# Patient Record
Sex: MALE | Race: WHITE | NOT HISPANIC OR LATINO | Employment: UNEMPLOYED
[De-identification: names, ages, dates, MRNs, and addresses within clinical notes are randomized per-mention and may not be internally consistent; named-entity substitution may affect disease eponyms.]

---
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
## 2018-02-13 ENCOUNTER — TRANSFERRED RECORDS (OUTPATIENT)
Dept: HEALTH INFORMATION MANAGEMENT | Facility: CLINIC | Age: 18
End: 2018-02-13

## 2018-02-15 ENCOUNTER — TRANSFERRED RECORDS (OUTPATIENT)
Dept: HEALTH INFORMATION MANAGEMENT | Facility: CLINIC | Age: 18
End: 2018-02-15

## 2018-02-15 LAB — EJECTION FRACTION: 58

## 2018-02-21 LAB — EJECTION FRACTION: 73

## 2018-03-20 ENCOUNTER — TRANSFERRED RECORDS (OUTPATIENT)
Dept: HEALTH INFORMATION MANAGEMENT | Facility: CLINIC | Age: 18
End: 2018-03-20

## 2018-03-22 ENCOUNTER — RESULTS ONLY (OUTPATIENT)
Dept: OTHER | Facility: CLINIC | Age: 18
End: 2018-03-22

## 2018-03-22 ENCOUNTER — TRANSFERRED RECORDS (OUTPATIENT)
Dept: HEALTH INFORMATION MANAGEMENT | Facility: CLINIC | Age: 18
End: 2018-03-22

## 2018-03-22 ENCOUNTER — APPOINTMENT (OUTPATIENT)
Dept: LAB | Facility: CLINIC | Age: 18
End: 2018-03-22
Attending: PEDIATRICS
Payer: COMMERCIAL

## 2018-03-22 PROCEDURE — 81370 HLA I & II TYPING LR: CPT | Performed by: PEDIATRICS

## 2018-03-22 PROCEDURE — 81382 HLA II TYPING 1 LOC HR: CPT | Performed by: PEDIATRICS

## 2018-03-22 PROCEDURE — 81376 HLA II TYPING 1 LOCUS LR: CPT | Mod: 91 | Performed by: PEDIATRICS

## 2018-03-22 PROCEDURE — 86828 HLA CLASS I&II ANTIBODY QUAL: CPT | Performed by: PEDIATRICS

## 2018-03-22 PROCEDURE — 81378 HLA I & II TYPING HR: CPT | Performed by: PEDIATRICS

## 2018-03-24 ENCOUNTER — APPOINTMENT (OUTPATIENT)
Dept: LAB | Facility: CLINIC | Age: 18
End: 2018-03-24
Attending: PEDIATRICS
Payer: COMMERCIAL

## 2018-03-24 PROCEDURE — 81370 HLA I & II TYPING LR: CPT | Performed by: PEDIATRICS

## 2018-03-24 PROCEDURE — 81376 HLA II TYPING 1 LOCUS LR: CPT | Mod: 59,91 | Performed by: PEDIATRICS

## 2018-03-27 LAB
A* LOCUS NMDP: NORMAL
A* LOCUS: NORMAL
A* NMDP: NORMAL
A*: NORMAL
ABTEST METHOD: NORMAL
B* LOCUS NMDP: NORMAL
B* LOCUS: NORMAL
B* NMDP: NORMAL
B*: NORMAL
BW-1: NORMAL
BW-2: NORMAL
C* LOCUS NMDP: NORMAL
C* LOCUS: NORMAL
C* NMDP: NORMAL
C*: NORMAL
DPA1* NMDP: NORMAL
DPA1*: NORMAL
DPB1* NMDP: NORMAL
DPB1*: NORMAL
DQA1*: NORMAL
DQA1*LOCUS NMDP: NORMAL
DQA1*LOCUS: NORMAL
DQB1* LOCUS NMDP: NORMAL
DQB1* LOCUS: NORMAL
DQB1* NMDP: NORMAL
DQB1*: NORMAL
DRB1* LOCUS NMDP: NORMAL
DRB1* LOCUS: NORMAL
DRB1* NMDP: NORMAL
DRB1*: NORMAL
DRB3* LOCUS NMDP: NORMAL
DRB3* LOCUS: NORMAL
DRB4*: NORMAL
DRSSO TEST METHOD: NORMAL
SCR 1 TEST METHOD: NORMAL
SCR1 CELL: NORMAL
SCR1 COMMENTS: NORMAL
SCR1 RESULT: NORMAL
SCR2 CELL: NORMAL
SCR2 COMMENTS: NORMAL
SCR2 RESULT: NORMAL
SCR2 TEST METHOD: NORMAL

## 2018-03-28 NOTE — PROGRESS NOTES
Pediatric Hematology/Oncology Clinic Note    I was requested by Dr. Manjinder Paniagua to see Artemio Nino for consultation on management of induction failure acute lymphoblastic leukemia.     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 Artemio was diagnosed on 2/5/18 during work-up for nonexertional syncope. WBC on diagnosis was 8,000. He is CNS status 1. Cytogenetics showed loss of IKZF1, LDA pos for Ph-like ALL, CRLF2 low, Fusion [id number] - activating JAK2. He did not have steroid pretreatment. He did not have testicular involvement. He underwent fertility preservation with sperm collection and then began induction chemotherapy per [id number] on 2/15/18. Day 8 LP was negative, Day 8 MRD 5.2%. 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 His end of induction marrow on 3/15/18 showed 31% blasts per [place] lab, 8% by morphology and was determined have shown induction failure. He was subsequently removed from study. He met with his primary oncologist Dr. Paniagua to discuss plans moving forward and he was subsequently referred for BMT to see Dr. Zavala on 4/6/18. He started Inotuzumab and has already received one dose. He is here today for a second opinion.    Complications during induction include concern for possible bactrim allergy as Artemio demonstrated a rash on his body that is itchy, diffuse, and non-petechial, steroid induced hyperglycemia (did not require insulin), and Palomino-Parkinson-White.     INTERVAL HISTORY  Artemio has been doing well since being discharged from the hospital. He continues with some intermittent nausea but has some available PRNs that he uses which seem to help. His energy and appetite are slowly improving. He states that he took his bactrim this week on Monday and Tuesday and has not noticed a rash so he is not sure that  his prior rash was related to the bactrim. He has no questions today and allows his Grandmother to ask many questions.     REVIEW OF SYSTEMS  General: fatigue, thin, pale  Skin: no rash  Eyes: negative  Ears/Nose/Throat: negative  Respiratory: No shortness of breath, dyspnea on exertion, cough, or hemoptysis  Cardiovascular: +WPW, no palpitations   Gastrointestinal: negative  Genitourinary: negative  Musculoskeletal: negative  Neurologic: negative  Psychiatric: negative  Hematologic/Lymphatic: +leukemia  Allergies/Immunologic: negative:  No known drug allergies   Endocrine: negative    PAST MEDICAL HISTORY  No past medical history on file.   L comminuted and shorted mid-clavicular fracture at age 14  L fibular closed stress fracture at age 16    PAST SURGICAL HISTORY  No past surgical history on file.   L clavicle ORIF with Lila locking plate and 7-hole with 6 screws and 1 lag screw - plate remains  Placement of venous port (large hub, 6.6 Turkish), left subclavian vein - 2/15/18    FAMILY HISTORY  No family history on file.   Maternal grandfather: thrombocytopenia  Paternal grandfather: Prostate Cancer, type 2 diabetes.    SOCIAL HISTORY  Social History     Social History Narrative     No narrative on file   Lives with grandmother and grandfather. Mother is involved but lives far away. Father lives in the area. Artemio finished high school and some college. He worked as a  with his father occasionally.    MEDICATIONS    Current Outpatient Prescriptions on File Prior to Visit:  TYLENOL OR None Entered     No current facility-administered medications on file prior to visit.     Physical Exam:   There were no vitals taken for this visit.,   General: Thin, pale adolescent, interactive  HEENT: NC/AT - alopecia. Eyes PERRL, EOMI, anicteric and non-injected. Nares clear. TMs pearly gray bilaterally. OP moist/pink without lesions, erythema or exudate.   Neck: Supple, no thyromegaly. Full ROM.  Lymph: No cervical,  "supraclavicular, axillary or inguinal adenopathy  Resp: Good air entry. Normal WOB. CTAB.  Cardiac: RRR. No murmur. Peripheral pulses intact. Cap refill < 2 sec. Port in place, c/d/i - not accessed.  Abdomen: BS+. Soft, NT, ND. No hepatosplenomegaly.  Neuro: Alert and oriented. CN 2-12 intact. Normal tone. Normal sensation. DTRs 2+ bilaterally. Normal gait.  Ext: WWP. MAEE. Symmetric. No edema.  Skin: No rashes, echymoses or other lesions.    LABS  CBCd on diagnosis 2/15/18;  WBC 6.2, Hgb 10.7, Plts 82, ANC 1.178    CBCd on 2/17/18 with 5% blasts    CSF 2/15/18 (On diagnosis):  Clear, 0 WBC, 20 RBC (no malignant cells)    CSF 2/22/18 (day 8 LP):  [place], 3 WBC, 200 RBC    Bone Marrow 2/15/18 (On diagnosis):    Bone Marrow Chromosome Analysis: Four of the 20 metaphase cells analyzed comprised a clone characterized by a translocation between the long arm of a chromosome 4 and the shore arm of a chromosome 12, and by a rearrangement of the short arm of chromosome 9 that could not be definitively characterized. In addition to the G-band analysis, FISH, and micro-array analyses were preformed which revealed losses of IKAROS and ETV6. No gain or loss of material relative to the abnormal chromosome 9 was detected, suggesting that the abnormality seen by G-banding may be a balanced rearrangement. These findings are consistent with but not specific to the diagnosis of acute lymphoblastic leukemia. The t(4;12) is not a known recurring abnormality and likely represents a secondary anomaly. Losses for IKZF1 and ETV6 are common in B-ALL and have been seen in the context of \"[place] chromosome like\" leukemia, although not exclusive to that subtype.     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 Reviewed outside records and reviewed diagnosis and Artemio's unfortunate induction failure. Discussed role of Inotuzumab and plan for referral to bone marrow transplant. Family already has an appointment with Dr. Zavala for 4/6/18. Artemio has 2 older sibs and they have already undergone HLA typing with reportedly his brother is a match.     PLAN  1. Continue Inotuzumab per primary team  2. RTC for BMT initial consult 4/6/18    Patient was seen and discussed with Pediatric Hematology/Oncology Dr. Reveles.   Edd Jones MD  Pediatric Hematology/Oncology/BMT Fellow    I saw and evaluated the patient with the fellow. I discussed the patient with the fellow and agree with the findings and plan as documented in the note. I personally spent a total of 45 minutes in the clinic in direct care of this patient. Total time included discussion with patient/family, physical examination, reviewing data such as laboratory and radiographic studies, and discussion with the fellow. Greater than 50% of the total time was spent counseling and/or coordinating the care of the patient. Details can be found in the fellow note.    Attila Reveles M.D./Ph.D  Pediatric Hematology/Oncology

## 2018-03-29 ENCOUNTER — MEDICAL CORRESPONDENCE (OUTPATIENT)
Dept: TRANSPLANT | Facility: CLINIC | Age: 18
End: 2018-03-29

## 2018-03-29 ENCOUNTER — OFFICE VISIT (OUTPATIENT)
Dept: PEDIATRIC HEMATOLOGY/ONCOLOGY | Facility: CLINIC | Age: 18
End: 2018-03-29
Attending: PEDIATRICS
Payer: COMMERCIAL

## 2018-03-29 DIAGNOSIS — C91.00 ACUTE LYMPHOBLASTIC LEUKEMIA (ALL) NOT HAVING ACHIEVED REMISSION (H): Primary | ICD-10-CM

## 2018-03-29 LAB
ASBTTEST METHOD: NORMAL
DRSBTTEST METHOD: NORMAL
SBTA* LOCUS: NORMAL
SBTA*: NORMAL
SBTB* LOCUS: NORMAL
SBTB*: NORMAL
SBTC* LOCUS: NORMAL
SBTC*: NORMAL
SBTDQB1*: NORMAL
SBTDQB1*LOCUS: NORMAL
SBTDRB1* LOCUS - FCIS: NORMAL
SBTDRB1* LOCUS NMDP: NORMAL
SBTDRB1*: NORMAL
SBTDRB3*LOCUS NMDP: NORMAL
SBTDRB3*LOCUS: NORMAL
SBTDRB4*LOCUS: NORMAL

## 2018-03-29 NOTE — LETTER
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 Artemio was diagnosed on 2/5/18 during work-up for nonexertional syncope. WBC on diagnosis was 8,000. He is CNS status 1. Cytogenetics showed loss of IKZF1, LDA pos for Ph-like ALL, CRLF2 low, Fusion [id number] - activating JAK2. He did not have steroid pretreatment. He did not have testicular involvement. He underwent fertility preservation with sperm collection and then began induction chemotherapy per [id number] on 2/15/18. Day 8 LP was negative, Day 8 MRD 5.2%. 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 His end of induction marrow on 3/15/18 showed 31% blasts per [place] lab, 8% by morphology and was determined have shown induction failure. He was subsequently removed from study. He met with his primary oncologist Dr. Paniagua to discuss plans moving forward and he was subsequently referred for BMT to see Dr. Zavala on 4/6/18. He started Inotuzumab and has already received one dose. He is here today for a second opinion.    Complications during induction include concern for possible bactrim allergy as Artemio demonstrated a rash on his body that is itchy, diffuse, and non-petechial, steroid induced hyperglycemia (did not require insulin), and Palomino-Parkinson-White.     INTERVAL HISTORY  Artemio has been doing well since being discharged from the hospital. He continues with some intermittent nausea but has some available PRNs that he uses which seem to help. His energy and appetite are slowly improving. He states that he took his bactrim this week on Monday and Tuesday and has not noticed a rash so he is not sure that his prior rash was related to the bactrim. He has no questions  today and allows his Grandmother to ask many questions.     REVIEW OF SYSTEMS  General: fatigue, thin, pale  Skin: no rash  Eyes: negative  Ears/Nose/Throat: negative  Respiratory: No shortness of breath, dyspnea on exertion, cough, or hemoptysis  Cardiovascular: +WPW, no palpitations   Gastrointestinal: negative  Genitourinary: negative  Musculoskeletal: negative  Neurologic: negative  Psychiatric: negative  Hematologic/Lymphatic: +leukemia  Allergies/Immunologic: negative:  No known drug allergies   Endocrine: negative    PAST MEDICAL HISTORY  No past medical history on file.   L comminuted and shorted mid-clavicular fracture at age 14  L fibular closed stress fracture at age 16    PAST SURGICAL HISTORY  No past surgical history on file.   L clavicle ORIF with Lila locking plate and 7-hole with 6 screws and 1 lag screw - plate remains  Placement of venous port (large hub, 6.6 Portuguese), left subclavian vein - 2/15/18    FAMILY HISTORY  No family history on file.   Maternal grandfather: thrombocytopenia  Paternal grandfather: Prostate Cancer, type 2 diabetes.    SOCIAL HISTORY  Social History     Social History Narrative     No narrative on file   Lives with grandmother and grandfather. Mother is involved but lives far away. Father lives in the area. Artemio finished high school and some college. He worked as a  with his father occasionally.    MEDICATIONS    Current Outpatient Prescriptions on File Prior to Visit:  TYLENOL OR None Entered     No current facility-administered medications on file prior to visit.     Physical Exam:   There were no vitals taken for this visit.,   General: Thin, pale adolescent, interactive  HEENT: NC/AT - alopecia. Eyes PERRL, EOMI, anicteric and non-injected. Nares clear. TMs pearly gray bilaterally. OP moist/pink without lesions, erythema or exudate.   Neck: Supple, no thyromegaly. Full ROM.  Lymph: No cervical, supraclavicular, axillary or inguinal adenopathy  Resp: Good  "air entry. Normal WOB. CTAB.  Cardiac: RRR. No murmur. Peripheral pulses intact. Cap refill < 2 sec. Port in place, c/d/i - not accessed.  Abdomen: BS+. Soft, NT, ND. No hepatosplenomegaly.  Neuro: Alert and oriented. CN 2-12 intact. Normal tone. Normal sensation. DTRs 2+ bilaterally. Normal gait.  Ext: WWP. MAEE. Symmetric. No edema.  Skin: No rashes, echymoses or other lesions.    LABS  CBCd on diagnosis 2/15/18;  WBC 6.2, Hgb 10.7, Plts 82, ANC 1.178    CBCd on 2/17/18 with 5% blasts    CSF 2/15/18 (On diagnosis):  Clear, 0 WBC, 20 RBC (no malignant cells)    CSF 2/22/18 (day 8 LP):  [place], 3 WBC, 200 RBC    Bone Marrow 2/15/18 (On diagnosis):    Bone Marrow Chromosome Analysis: Four of the 20 metaphase cells analyzed comprised a clone characterized by a translocation between the long arm of a chromosome 4 and the shore arm of a chromosome 12, and by a rearrangement of the short arm of chromosome 9 that could not be definitively characterized. In addition to the G-band analysis, FISH, and micro-array analyses were preformed which revealed losses of IKAROS and ETV6. No gain or loss of material relative to the abnormal chromosome 9 was detected, suggesting that the abnormality seen by G-banding may be a balanced rearrangement. These findings are consistent with but not specific to the diagnosis of acute lymphoblastic leukemia. The t(4;12) is not a known recurring abnormality and likely represents a secondary anomaly. Losses for IKZF1 and ETV6 are common in B-ALL and have been seen in the context of \"[place] chromosome like\" leukemia, although not exclusive to that subtype.     Flow: CD9, CD10, CD20, CD22, CD33, CD34, CD38, CD45, CD52, CD58, CD79a, HLA-DR, TdT, dual expression CD10/CD19 & TdT/CD19    Cytogenetics: 46, XY, t(4:12)(q22.1;p13.1),?inv(9)(p21p23)[4]/46,XY[16]    Fish: 74.5% loss of IKZF1, 80% loss of ETV6 & JAK2 activation    IMAGING  None    ASSESSMENT  Artemio is a 17 year old male with VHR B " lineage acute lymphoblastic leukemia Ph-like with JAK2 activation currently receiving care at [facility] here in [facility] clinic with his Grandmother for second opinion. Reviewed outside records and reviewed diagnosis and Artemio's unfortunate induction failure. Discussed role of Inotuzumab and plan for referral to bone marrow transplant. Family already has an appointment with Dr. Zavala for 4/6/18. Artemio has 2 older sibs and they have already undergone HLA typing with reportedly his brother is a match.     PLAN  1. Continue Inotuzumab per primary team  2. RTC for BMT initial consult 4/6/18    Patient was seen and discussed with Pediatric Hematology/Oncology Dr. Reveles.   Edd Jones MD  Pediatric Hematology/Oncology/BMT Fellow    I saw and evaluated the patient with the fellow. I discussed the patient with the fellow and agree with the findings and plan as documented in the note. I personally spent a total of 45 minutes in the clinic in direct care of this patient. Total time included discussion with patient/family, physical examination, reviewing data such as laboratory and radiographic studies, and discussion with the fellow. Greater than 50% of the total time was spent counseling and/or coordinating the care of the patient. Details can be found in the fellow note.    Attila Reveles M.D./Ph.D  Pediatric Hematology/Oncology      Attila Reveles MD

## 2018-03-29 NOTE — MR AVS SNAPSHOT
After Visit Summary   3/29/2018    Artemio Nino    MRN: [id number]           Patient Information     Date Of Birth          2000        Visit Information        Provider Department      3/29/2018 11:00 AM Attila Reveles MD Peds Hematology Oncology        Today's Diagnoses     Acute lymphoblastic leukemia (ALL) not having achieved remission (H)    -  1          [facility], 9th floor  [address] [place] [id number]  Phone: [phone]  Clinic Hours:   Monday-Friday:   7 am to 5:00 pm   closed weekends and major  holidays     If your fever is 100.5  or greater,   call the clinic during business hours.   After hours call [phone] and ask for the pediatric hematology / oncology physician to be paged for you.               Follow-ups after your visit        Your next 10 appointments already scheduled     Apr 06, 2018 11:00 AM CDT   [facility] Bmt Nurse Coord with [facility] PEDS BMT NURSE COORDINATOR   Peds Blood and Marrow Transplant ([facility])    [address] [id number] [phone]            Apr 06, 2018 11:00 AM CDT   [facility] Bmt Peds New with Viky Zavala MD   Peds Blood and Marrow Transplant ([facility])    [address] [id number] [phone]            Apr 06, 2018 12:30 PM CDT   SOCIAL WORK with [facility] PEDS BMT    Peds Blood and Marrow Transplant ([facility])    [address] [id number] [phone]              Who to contact     Please call your clinic at [phone] to:    Ask questions about your health    Make or cancel appointments    Discuss your medicines    Learn about your test results    Speak to your doctor            Additional Information About Your Visit        MyChart Information     MyChart  is an electronic gateway that provides easy, online access to your medical records. With [employer], you can request a clinic appointment, read your test results, renew a prescription or communicate with your care team.     To sign up for [employer] visit the website at [email or web address]   You will be asked to enter the access code listed below, as well as some personal information. Please follow the directions to create your username and password.     Your access code is: [email or web address]  Expires: 2018 10:16 AM     Your access code will  in 90 days. If you need help or a new code, please contact your [facility] or call [phone] for assistance.        Care EveryWhere ID     This is your Care EveryWhere ID. This could be used by other organizations to access your [facility] medical records  [id number]         Blood Pressure from Last 3 Encounters:   04/10/06 98/54   04 98/60    Weight from Last 3 Encounters:   06 27 kg (59 lb 8 oz) (57 %)*   04/10/06 26.2 kg (57 lb 12 oz) (60 %)*   04 20.8 kg (45 lb 12 oz) (47 %)*     * Growth percentiles are based on CDC 2-20 Years data.              Today, you had the following     No orders found for display       Primary Care Provider    None Specified       No primary provider on file.        Equal Access to Services     CHARY MEJIAS : Hadii errol palacios hadstephanie Soshady, waaxda denisa, qaybta kaaljessy garcía . So [facility] [phone].    ATENCIÓN: Si habla español, tiene a london disposición servicios gratuitos de asistencia lingüística. Rita al [phone].    We comply with applicable federal civil rights laws and [place] laws. We do not discriminate on the basis of race, color, national origin, age, disability, sex, sexual orientation, or gender identity.            Thank you!     Thank you for choosing PEDS HEMATOLOGY ONCOLOGY  for your care. Our goal is always to provide  you with excellent care. Hearing back from our patients is one way we can continue to improve our services. Please take a few minutes to complete the written survey that you may receive in the mail after your visit with us. Thank you!             Your Updated Medication List - Protect others around you: Learn how to safely use, store and throw away your medicines at www.disposemymeds.org.          This list is accurate as of 3/29/18 11:59 PM.  Always use your most recent med list.                   Brand Name Dispense Instructions for use Diagnosis    TYLENOL PO      None Entered    Acute pharyngitis

## 2018-04-02 ENCOUNTER — APPOINTMENT (OUTPATIENT)
Dept: LAB | Facility: CLINIC | Age: 18
End: 2018-04-02
Attending: PEDIATRICS
Payer: COMMERCIAL

## 2018-04-02 PROCEDURE — 81370 HLA I & II TYPING LR: CPT | Performed by: PEDIATRICS

## 2018-04-02 PROCEDURE — 81376 HLA II TYPING 1 LOCUS LR: CPT | Mod: 91 | Performed by: PEDIATRICS

## 2018-04-06 ENCOUNTER — ALLIED HEALTH/NURSE VISIT (OUTPATIENT)
Dept: TRANSPLANT | Facility: CLINIC | Age: 18
End: 2018-04-06
Attending: PEDIATRICS
Payer: COMMERCIAL

## 2018-04-06 DIAGNOSIS — C91.00 ALL (ACUTE LYMPHOCYTIC LEUKEMIA) (H): Primary | ICD-10-CM

## 2018-04-06 DIAGNOSIS — C91.00 ACUTE LYMPHOBLASTIC LEUKEMIA (ALL) NOT HAVING ACHIEVED REMISSION (H): Primary | ICD-10-CM

## 2018-04-06 DIAGNOSIS — Z71.9 ENCOUNTER FOR COUNSELING: Primary | ICD-10-CM

## 2018-04-06 DIAGNOSIS — Z76.82 BONE MARROW TRANSPLANT CANDIDATE: ICD-10-CM

## 2018-04-06 PROCEDURE — 40000268 ZZH STATISTIC NO CHARGES: Mod: ZF

## 2018-04-06 PROCEDURE — G0463 HOSPITAL OUTPT CLINIC VISIT: HCPCS | Mod: ZF

## 2018-04-06 PROCEDURE — 40000809 ZZH STATISTIC NO DOCUMENTATION TO SUPPORT CHARGE

## 2018-04-06 ASSESSMENT — PAIN SCALES - GENERAL: PAINLEVEL: NO PAIN (0)

## 2018-04-06 NOTE — LETTER
4/6/2018      RE: Artemio Nino  [address] [address]       April 6, 2018    Dear Dr. Paniagua,    I had the pleasure of seeing Artemio in our Pediatric Blood and Marrow Transplant Clinic on April 6, 2018.  He was accompanied by his mother, father and grandmother.  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 The purpose of today's visit was to discuss the rationale behind stem cell transplantation for his cure. In this note I will summarize his PMH along with our discussion.     Artemio was diagnosed on 2/5/18 during work-up for nonexertional syncope. WBC on diagnosis was 8,000. He is CNS status 1. Cytogenetics showed loss of IKZF1, LDA pos for Ph-like ALL, CRLF2 low, Fusion JAK2-SEW400 - activating JAK2. He did not have steroid pretreatment. He did not have testicular involvement. He underwent fertility preservation with sperm collection and then began induction chemotherapy per [id number] on 2/15/18. Day 8 LP was negative, Day 8 MRD 5.2%. 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 His end of induction marrow on 3/15/18 showed 31% blasts per [facility] lab, 8% by morphology and was determined have shown induction failure. He was subsequently removed from study. He met with his primary oncologist Dr. Paniagua to discuss plans moving forward and also had a second opinion from Dr. Attila Reveles. He started Inotuzumab and has already received two doses.    Pertinent labs for ALL    CBCd on diagnosis 2/15/18;  WBC 6.2, Hgb 10.7, Plts 82, ANC 1.178     CBCd on 2/17/18 with 5% blasts     CSF 2/15/18 (On diagnosis):  Clear, 0 WBC, 20 RBC (no malignant cells)     CSF 2/22/18 (day 8 LP):  Clarence, 3 WBC, 200 RBC     Bone Marrow 2/15/18 (On diagnosis):     Bone Marrow Chromosome Analysis: Four of the 20 metaphase cells analyzed comprised a clone characterized by a  "translocation between the long arm of a chromosome 4 and the shore arm of a chromosome 12, and by a rearrangement of the short arm of chromosome 9 that could not be definitively characterized. In addition to the G-band analysis, FISH, and micro-array analyses were preformed which revealed losses of IKAROS and ETV6. No gain or loss of material relative to the abnormal chromosome 9 was detected, suggesting that the abnormality seen by G-banding may be a balanced rearrangement. These findings are consistent with but not specific to the diagnosis of acute lymphoblastic leukemia. The t(4;12) is not a known recurring abnormality and likely represents a secondary anomaly. Losses for IKZF1 and ETV6 are common in B-ALL and have been seen in the context of \"Delta chromosome like\" leukemia, although not exclusive to that subtype.      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        Artemio has been doing well since being discharged from the hospital. He has no complaints.  He has no fever, cough or runny nose.  He will get an additional dose of inotuzumab on Monday.         PAST MEDICAL HISTORY  L comminuted and shorted mid-clavicular fracture at age 14  L fibular closed stress fracture at age 16     PAST SURGICAL HISTORY  L clavicle ORIF with Lila locking plate and 7-hole with 6 screws and 1 lag screw - plate remains  Placement of venous port (large hub, 6.6 Azeri), left subclavian vein - 2/15/18     FAMILY HISTORY    Maternal grandfather: " thrombocytopenia  Paternal grandfather: Prostate Cancer, type 2 diabetes.     SOCIAL HISTORY  Lives with grandmother and grandfather. Mother is involved and has moved back. Father lives in the area. Artemio finished high school and some college. He worked as a  with his father occasionally.       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  Of note both of his sibs are a match.       The next part of our discussion was around the utilization of stem cell transplant in curing relapsed leukemia.  We reviewed Artemio Nino treatment course so far and results from the latest bone marrow examination highlighting current disease status. For patients with ALL, studies have shown superior outcomes with disease in deep remission (MRD negative). We agree with further chemotherapy with inotuzumab, which will help eliminate leukemic cells further. Once in deep remission, stem cell transplant from the matched sibling will be the most preferred option.     We discussed overall transplant procedure and details about conditioning regimen, possible adverse effects as well as transplant related complications including but not limited to mucositis, fevers, nausea, vomiting, hypertension, transfusion requirements, TPN and organ toxicity. With matched sibling transplants transplant related mortality is less than 5% and the risk of hzcaj-yfuisr-iycw disease is about 20% as well. We also discussed that with type of conditioning that Artemio  will receive prior to transplant will involve total body radiation. As Artemio  will be most likely receiving matched sibling bone marrow stem cells form a fully HLA-matched donor with a myeloablative conditioning regimen, risk of graft failure is very low. We did inform him that even with successful matched  sibling BMT, there is a 25-30% of disease relapse.  I also discussed the harvest procedure with Artemio.  Both siblings are a match.  I will discuss with the group which would be better.  There is a concern that the sister may have some psychosocial issues that could preclude her.      Please keep us updated on his progress.  Once MRD negative we will plan on doing the workup week and having him come to transplant.     I will also plan on defibrotide prophylaxis due to inotuzumab.    Thank you for this referral.      Sincerely,    Viky Zavala MD, PhD    Pediatric Blood and Marrow Transplant  [facility]    I spent 50 minutes of face to face time with the family, all of which was counseling.  I spent an additional 30 minutes reviewing medical records and coordinating care.              Viky Zavala MD

## 2018-04-06 NOTE — MR AVS SNAPSHOT
After Visit Summary   2018    Artemio Nino    MRN: [id number]           Patient Information     Date Of Birth          2000        Visit Information        Provider Department      2018 12:30 PM P PEDS BMT  Peds Blood and Marrow Transplant        Today's Diagnoses     Encounter for counseling    -  1          [facility], 9th floor  [address] [place] [address]  Phone: [phone]  Clinic Hours:   Monday-Friday:   7 am to 5:00 pm   closed weekends and major  holidays     If your fever is 100.5  or greater,   call the clinic during business hours.   After hours call [phone] and ask for the pediatric BMT physician to be paged for you.              Care Instructions    No follow up instructions as of 2018 at 1:35pm SLL          Follow-ups after your visit        Who to contact     Please call your clinic at [phone] to:    Ask questions about your health    Make or cancel appointments    Discuss your medicines    Learn about your test results    Speak to your doctor            Additional Information About Your Visit        JorotoharDocument Agility Information     Bag Borrow or Steal is an electronic gateway that provides easy, online access to your medical records. With Bag Borrow or Steal, you can request a clinic appointment, read your test results, renew a prescription or communicate with your care team.     To sign up for Bag Borrow or Steal visit the website at [email or web address]   You will be asked to enter the access code listed below, as well as some personal information. Please follow the directions to create your username and password.     Your access code is: [email or web address]  Expires: 2018 10:16 AM     Your access code will  in 90 days. If you need help or a new code, please contact your [facility] or call [phone] for assistance.        Care EveryWhere ID     This is your Care EveryWhere ID.  This could be used by other organizations to access your [facility] medical records  [id number]         Blood Pressure from Last 3 Encounters:   04/10/06 98/54   07/29/04 98/60    Weight from Last 3 Encounters:   09/07/06 27 kg (59 lb 8 oz) (57 %)*   04/10/06 26.2 kg (57 lb 12 oz) (60 %)*   07/29/04 20.8 kg (45 lb 12 oz) (47 %)*     * Growth percentiles are based on [facility] 2-20 Years data.              Today, you had the following     No orders found for display       Primary Care Provider    None Specified       No primary provider on file.        Equal Access to Services     [facility]: Hadii errol Carlos, scott crawley, mega ehnryalmasebastián gomez, jessy palm . So [facility] [phone].    ATENCIÓN: Si habla español, tiene a london disposición servicios gratuitos de asistencia lingüística. Llame al [phone].    We comply with applicable federal civil rights laws and [place] laws. We do not discriminate on the basis of race, color, national origin, age, disability, sex, sexual orientation, or gender identity.            Thank you!     Thank you for choosing [facility] BLOOD AND MARROW TRANSPLANT  for your care. Our goal is always to provide you with excellent care. Hearing back from our patients is one way we can continue to improve our services. Please take a few minutes to complete the written survey that you may receive in the mail after your visit with us. Thank you!             Your Updated Medication List - Protect others around you: Learn how to safely use, store and throw away your medicines at www.disposemymeds.org.          This list is accurate as of 4/6/18 11:59 PM.  Always use your most recent med list.                   Brand Name Dispense Instructions for use Diagnosis    TYLENOL PO      None Entered    Acute pharyngitis

## 2018-04-06 NOTE — PROGRESS NOTES
"BMT Social Work New Transplant Evaluation    Present:  This  met with Artemio, his mother, his father, and his paternal grandmother on April 6, 2018 in the [facility].     Referring MD: Manjinder Paniagua MD   at [facility]:  Ashley Curtis MSRAFI  BMT New Evaluation MD: Viky Zavala MD  Other(s): Daria Jara RN    Presenting Information: Artemio is a 17 year old male with a recent diagnosis of B lineage acute lymphoblastic leukemia (ALL).  He is receiving his oncology care at [facility].  His ALL was diagnosed on February 5, 2018.  He completed fertility preservation with sperm collection prior to starting chemotherapy on February 15th. 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 His end of induction marrow on 3/15/18 showed 31% blasts per [facility] lab, 8% by morphology and was determined have shown induction failure. He was subsequently removed from study. He met with his primary oncologist Dr. Paniagua to discuss plans moving forward and also had a second opinion from Dr. Attila Reveles (this appointment was with his mother). He started Inotuzumab and has already received two doses.     Special Needs:  Legal: Patient is an adult.  He had previously signed release of information forms so that we may talk to his father, step mother and grandmother. Today he signed a release to include his mother, brother and sister on the list of persons who we may speak to regarding his medical care, donor search, financial coverage and treatment plan. Please see Media tab for scanned forms.    Family Constellation: Parents are Demian \"Dangelo\" Trung and Mervat Villaseñor, who are .  Dangelo is  to Mervat Meyer, who carries Artemio's health insurance. Dangelo and Camryn live locally in [place].  Mervat had just relocated to [place] last fall, to live near her parents, and to help care for them. She has temporarily " "relocated to [place] to be of support to Artemio as he fights leukemia.  Artemio currently lives with his paternal grandparents in [place]. Arielle Nino, his grandmother, is here today at the appointment.  Artemio has two older siblings.  Link Nino ( 91) lives and works near his mother in [place].  Kamilah Nino ( 94) lives locally in [place].  Kamilah has a one year old child. Both Link and Kamilah are HLA matches and Dr. Zavala will need to determine who will serve as the related donor.     Education/Employment: Artemio graduated from [employer] in 2017.  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     As noted above, Dangelo has his own commercial  business, [employer].  His wife is a nurse and works full time for [employer].  Artemio's mom is a nurse and she had just taken a new job in [place], but quit that position to come back to [place] once she learned of Artemio's disease. Mervat previously worked at a hospital in [place].  She recently took a new, temporary three month \"traveler job\" in Telemetry at [employer] in [place], so she said she is technically working for [place] now.     Finances/Insurance:  Artemio is insured through his step mother's employer.  It is a Blue Cross Blue Shield policy.  The family is also looking into [place] Medical Assistance (MA) as a secondary insurance.  Mr. Nino said they were thinking of dropping the primary insurance if Artemio is approved for MA, but he tells me that \"someone high up at [place] told them not to drop it just yet.\"  He could not tell me what department or the name of the person who gave this advice.  I explained that they may do whatever they wish in terms of insurance for Artemio. But he did state that he realizes that the transplant would currently be approved by the commercial insurance.  "      Caregiver: This needs to be determined.  There are many adults who care about Artemio (dad, mom, step mom, grandparents, siblings).  I explained that anyone age 18 or over can be identified as his caregiver.  Right now, no one stays with him at [facility] when he is inpatient overnight.  I advised that they will want to consider being present with him during transplant to advocate for his needs and provide emotional support to Artemio. I also discussed the caregiver requirement once Artemio is outpatient.     Healthcare Directive:  Artemio is an adult and his HCD completed at [facility].  We can ask the patient to provide a copy, or we can ask Artemio if the  at [facility] could have his permission to scan to us. Per the note from [facility], his father is listed as primary health care agent.  We have not seen a copy of the form.     Resources Provided:  BMT Information and Resources Packet: X   I provided three packets to the patient and family:  One to Artemio (and his grandmother), one to his father, and one to his mother    Tour of Unit:  Declined, will have tour during workup: X  Artemio said he did not wish to take the time to see the BMT unit today.     Identified Concerns: There are several issues that need to be addressed prior to or during work up.    1)  If brother is the chosen donor, he will need help paying for flights to/from [place].  I explained that we will look at the Be the Match donor assistance program first, then would evaluate our own hospital programs that could assist him.     2) Caregiver plan for before, during and after transplant remains a concern.  It was not made clear today who will be present with Artemio during work up, how the family would manage his inpatient care, and they did not identify which adults would be caring for him as an outpatient.  Artemio did not indicate any preferences.  The family suggested that he would  "return to living at his grandmother's home.  I did not formally ask Arielle if she was willing to be the full time caregiver, as the three adults accompanying Artemio were very quiet when I brought up the topic.. But I heard concern after the appointment today both from the  at [facility], and then from Artemio's mom, that the grandparents intend to [place] for the [place], and that the plan would be for people to \"check in on him\" from time to time.  Had I known that at the time of the appointment, I would have been even more clear that he cannot be alone.  I did state to all present that we require a 24 hour a day caregiver following discharge.    3) The safety and cleanliness of Artemio's living arrangements following transplant are a concern verbalized by Mervat, Artemio's mom.  Apparently he lives in the basement of his grandparents home and there is no egress window?  Arielle, his grandmother, stated today that the basement has been \"tested for mold and there isn't any.\"  Mervat told me confidentially following the appointment that she doesn't the grandparents' basement it is a safe plan for a post transplant patient. Without seeing the space, it is difficult to assess who is correct, the grandmother or the mother?  Arteimo had nothing to offer during this conversation regarding his lodging preference.      4) Communication with caregivers:  When trying to arrange the appointment today, our BMT  observed that the mom, dad, and step mom, were all calling for the same information.  The admin called Artemio regarding communication with his parents and step mom, and he said, \"Sure, you can talk to all of them.\"  It will need to be determined how they will all receive information, as it may not be feasible for the BMT team to have three different conversations daily with caregivers, if all want updates?  We will explore how they might work together to " exchange information.     Summary: Artemio is a 17 year old young man with a very recent diagnosis of ALL. He, his parents and grandmother admitted that they were still processing all of the information they had just been presented with by Dr. Zavala.  Artemio answered some questions directly today, but for the most part, he seemed happy to allow his parents and grandmother to ask and answer questions.  While Mervat asked good and pertinent questions, the father and grandmother had very little to say.  Artemio sat away from the other adults and was very quiet.  It was difficult to assess if this is his usual style, or whether he is just trying not to offend any of the adults.  Parents, who are , did not show any signs of conflict today during this appointment.     Plan: Artemio will return for work up, possibly as early as April 30th. This  or another BMT  will be in contact with the family to review the above concerns. If his brother is chosen as the donor, social work will help with transportation to [place].     Sydnee Christianson, [facility] Medical CenterSW    NO LETTER

## 2018-04-06 NOTE — NURSING NOTE
Chief Complaint   Patient presents with     New Patient     New patient here today for consutl for ALL     There were no vitals taken for this visit.  Erinn Underwood, GREG  April 6, 2018'

## 2018-04-06 NOTE — MR AVS SNAPSHOT
After Visit Summary   2018    Artemio Nino    MRN: [id number]           Patient Information     Date Of Birth          2000        Visit Information        Provider Department      2018 11:00 AM [facility] B[facility] NURSE COORDINATOR Peds Blood and Marrow Transplant        Today's Diagnoses     ALL (acute lymphocytic leukemia) (H)    -  1    Bone marrow transplant candidate              [facility] 9th floor  [address] [place] [address]  Phone: [phone]  Clinic Hours:   Monday-Friday:   7 am to 5:00 pm   closed weekends and major  holidays     If your fever is 100.5  or greater,   call the clinic during business hours.   After hours call [phone] and ask for the pediatric BMT physician to be paged for you.               Follow-ups after your visit        Who to contact     Please call your clinic at [phone] to:    Ask questions about your health    Make or cancel appointments    Discuss your medicines    Learn about your test results    Speak to your doctor            Additional Information About Your Visit        MyChart Information     Mohive is an electronic gateway that provides easy, online access to your medical records. With Mohive, you can request a clinic appointment, read your test results, renew a prescription or communicate with your care team.     To sign up for Mohive visit the website at [email or web address]   You will be asked to enter the access code listed below, as well as some personal information. Please follow the directions to create your username and password.     Your access code is: [email or web address]  Expires: 2018 10:16 AM     Your access code will  in 90 days. If you need help or a new code, please contact your [facility] or call [phone] for assistance.        Care EveryWhere ID     This is your Care EveryWhere ID. This could be used by other  organizations to access your [place] medical records  [id number]         Blood Pressure from Last 3 Encounters:   04/10/06 98/54   07/29/04 98/60    Weight from Last 3 Encounters:   09/07/06 27 kg (59 lb 8 oz) (57 %)*   04/10/06 26.2 kg (57 lb 12 oz) (60 %)*   07/29/04 20.8 kg (45 lb 12 oz) (47 %)*     * Growth percentiles are based on [facility] 2-20 Years data.              Today, you had the following     No orders found for display       Primary Care Provider    None Specified       No primary provider on file.        Equal Access to Services     [facility] : Hadkhurram Carlos, scott crawley, mega gomez, jessy palm . So [facility] [phone].    ATENCIÓN: Si habla español, tiene a london disposición servicios gratuitos de asistencia lingüística. Llame al [phone].    We comply with applicable federal civil rights laws and [place] laws. We do not discriminate on the basis of race, color, national origin, age, disability, sex, sexual orientation, or gender identity.            Thank you!     Thank you for choosing [facility] BLOOD AND MARROW TRANSPLANT  for your care. Our goal is always to provide you with excellent care. Hearing back from our patients is one way we can continue to improve our services. Please take a few minutes to complete the written survey that you may receive in the mail after your visit with us. Thank you!             Your Updated Medication List - Protect others around you: Learn how to safely use, store and throw away your medicines at www.disposemymeds.org.          This list is accurate as of 4/6/18 11:59 PM.  Always use your most recent med list.                   Brand Name Dispense Instructions for use Diagnosis    TYLENOL PO      None Entered    Acute pharyngitis

## 2018-04-06 NOTE — MR AVS SNAPSHOT
After Visit Summary   2018    Artemio Nino    MRN: [id number]           Patient Information     Date Of Birth          2000        Visit Information        Provider Department      2018 11:00 AM Viky Zavala MD Peds Blood and Marrow Transplant        Today's Diagnoses     Acute lymphoblastic leukemia (ALL) not having achieved remission (H)    -  1          [facility], 9th floor  Counts include 234 beds at the [facility] [place] [address]  Phone: [phone]  Clinic Hours:   Monday-Friday:   7 am to 5:00 pm   closed weekends and major  holidays     If your fever is 100.5  or greater,   call the clinic during business hours.   After hours call [phone] and ask for the pediatric BMT physician to be paged for you.               Follow-ups after your visit        Who to contact     Please call your clinic at [phone] to:    Ask questions about your health    Make or cancel appointments    Discuss your medicines    Learn about your test results    Speak to your doctor            Additional Information About Your Visit        MyChart Information     PartSimple is an electronic gateway that provides easy, online access to your medical records. With PartSimple, you can request a clinic appointment, read your test results, renew a prescription or communicate with your care team.     To sign up for PartSimple visit the website at [email or web address]   You will be asked to enter the access code listed below, as well as some personal information. Please follow the directions to create your username and password.     Your access code is: [email or web address]  Expires: 2018 10:16 AM     Your access code will  in 90 days. If you need help or a new code, please contact your [facility] or call [phone] for assistance.        Care EveryWhere ID     This is your Care EveryWhere ID. This could be used by other organizations  to access your [facility] [place] medical records  [id number]         Blood Pressure from Last 3 Encounters:   04/10/06 98/54   07/29/04 98/60    Weight from Last 3 Encounters:   09/07/06 27 kg (59 lb 8 oz) (57 %)*   04/10/06 26.2 kg (57 lb 12 oz) (60 %)*   07/29/04 20.8 kg (45 lb 12 oz) (47 %)*     * Growth percentiles are based on [facility] 2-20 Years data.              Today, you had the following     No orders found for display       Primary Care Provider    None Specified       No primary provider on file.        Equal Access to Services     [facility]: Hadkhurram Carlos, scott crawley, mega gomez, jessy palm . So [facility] [phone].    ATENCIÓN: Si habla español, tiene a london disposición servicios gratuitos de asistencia lingüística. Llame al [phone].    We comply with applicable federal civil rights laws and [place] laws. We do not discriminate on the basis of race, color, national origin, age, disability, sex, sexual orientation, or gender identity.            Thank you!     Thank you for choosing [facility] BLOOD AND MARROW TRANSPLANT  for your care. Our goal is always to provide you with excellent care. Hearing back from our patients is one way we can continue to improve our services. Please take a few minutes to complete the written survey that you may receive in the mail after your visit with us. Thank you!             Your Updated Medication List - Protect others around you: Learn how to safely use, store and throw away your medicines at www.disposemymeds.org.          This list is accurate as of 4/6/18 11:59 PM.  Always use your most recent med list.                   Brand Name Dispense Instructions for use Diagnosis    TYLENOL PO      None Entered    Acute pharyngitis

## 2018-04-07 NOTE — PROGRESS NOTES
April 6, 2018    Dear Dr. Paniagua,    I had the pleasure of seeing Artemio in our Pediatric Blood and Marrow Transplant Clinic on April 6, 2018.  He was accompanied by his mother, father and grandmother.  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 The purpose of today's visit was to discuss the rationale behind stem cell transplantation for his cure. In this note I will summarize his PMH along with our discussion.     Artemio was diagnosed on 2/5/18 during work-up for nonexertional syncope. WBC on diagnosis was 8,000. He is CNS status 1. Cytogenetics showed loss of IKZF1, LDA pos for Ph-like ALL, CRLF2 low, Fusion [id number] - activating JAK2. He did not have steroid pretreatment. He did not have testicular involvement. He underwent fertility preservation with sperm collection and then began induction chemotherapy per [id number] on 2/15/18. Day 8 LP was negative, Day 8 MRD 5.2%. 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 His end of induction marrow on 3/15/18 showed 31% blasts per [facility], 8% by morphology and was determined have shown induction failure. He was subsequently removed from study. He met with his primary oncologist Dr. Paniagua to discuss plans moving forward and also had a second opinion from Dr. Attila Reveles. He started Inotuzumab and has already received two doses.    Pertinent labs for ALL    CBCd on diagnosis 2/15/18;  WBC 6.2, Hgb 10.7, Plts 82, ANC 1.178     CBCd on 2/17/18 with 5% blasts     CSF 2/15/18 (On diagnosis):  Clear, 0 WBC, 20 RBC (no malignant cells)     CSF 2/22/18 (day 8 LP):  [place], 3 WBC, 200 RBC     Bone Marrow 2/15/18 (On diagnosis):     Bone Marrow Chromosome Analysis: Four of the 20 metaphase cells analyzed comprised a clone characterized by a translocation between the long arm of a chromosome 4 and the shore arm of a chromosome 12,  "and by a rearrangement of the short arm of chromosome 9 that could not be definitively characterized. In addition to the G-band analysis, FISH, and micro-array analyses were preformed which revealed losses of IKAROS and ETV6. No gain or loss of material relative to the abnormal chromosome 9 was detected, suggesting that the abnormality seen by G-banding may be a balanced rearrangement. These findings are consistent with but not specific to the diagnosis of acute lymphoblastic leukemia. The t(4;12) is not a known recurring abnormality and likely represents a secondary anomaly. Losses for IKZF1 and ETV6 are common in B-ALL and have been seen in the context of \"[place] chromosome like\" leukemia, although not exclusive to that subtype.      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        Artemio has been doing well since being discharged from the hospital. He has no complaints.  He has no fever, cough or runny nose.  He will get an additional dose of inotuzumab on Monday.         PAST MEDICAL HISTORY  L comminuted and shorted mid-clavicular fracture at age 14  L fibular closed stress fracture at age 16     PAST SURGICAL HISTORY  L clavicle ORIF with Lila locking plate and 7-hole with 6 screws and 1 lag screw - plate remains  Placement of venous port (large hub, 6.6 Maldivian), left subclavian vein - 2/15/18     FAMILY HISTORY    Maternal grandfather: thrombocytopenia  Paternal grandfather: Prostate Cancer, type 2 diabetes.     SOCIAL HISTORY  Lives " with grandmother and grandfather. Mother is involved and has moved back. Father lives in the area. Artemio finished high school and some college. He worked as a  with his father occasionally.       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  Of note both of his sibs are a match.       The next part of our discussion was around the utilization of stem cell transplant in curing relapsed leukemia.  We reviewed Artemio Nino treatment course so far and results from the latest bone marrow examination highlighting current disease status. For patients with ALL, studies have shown superior outcomes with disease in deep remission (MRD negative). We agree with further chemotherapy with inotuzumab, which will help eliminate leukemic cells further. Once in deep remission, stem cell transplant from the matched sibling will be the most preferred option.     We discussed overall transplant procedure and details about conditioning regimen, possible adverse effects as well as transplant related complications including but not limited to mucositis, fevers, nausea, vomiting, hypertension, transfusion requirements, TPN and organ toxicity. With matched sibling transplants transplant related mortality is less than 5% and the risk of wjntu-pitezi-hdlh disease is about 20% as well. We also discussed that with type of conditioning that Artemio  will receive prior to transplant will involve total body radiation. As Artemio  will be most likely receiving matched sibling bone marrow stem cells form a fully HLA-matched donor with a myeloablative conditioning regimen, risk of graft failure is very low. We did inform him that even with successful matched sibling BMT, there is a 25-30% of disease relapse.  I also discussed the harvest procedure with  Artemio.  Both siblings are a match.  I will discuss with the group which would be better.  There is a concern that the sister may have some psychosocial issues that could preclude her.      Please keep us updated on his progress.  Once MRD negative we will plan on doing the workup week and having him come to transplant.     I will also plan on defibrotide prophylaxis due to inotuzumab.    Thank you for this referral.      Sincerely,    Viky Zavala MD, PhD    Pediatric Blood and Marrow Transplant  [facility]    I spent 50 minutes of face to face time with the family, all of which was counseling.  I spent an additional 30 minutes reviewing medical records and coordinating care.

## 2018-04-25 ENCOUNTER — TELEPHONE (OUTPATIENT)
Dept: TRANSPLANT | Facility: CLINIC | Age: 18
End: 2018-04-25

## 2018-04-25 ENCOUNTER — CARE COORDINATION (OUTPATIENT)
Dept: TRANSPLANT | Facility: CLINIC | Age: 18
End: 2018-04-25

## 2018-04-25 DIAGNOSIS — Z86.2 PERSONAL HISTORY OF DISEASES OF BLOOD AND BLOOD-FORMING ORGANS: Primary | ICD-10-CM

## 2018-04-25 NOTE — TELEPHONE ENCOUNTER
Mother (Mervat) left a message on the triage voicemail inquiring whether or not a decision has been made regarding donor selection for Artemio.  Both siblings were reported to be a match-his brother is out of state and his sister is local.     Mom can be reached at [phone].    Message has been routed to primary MD and NC for response.       Lilia Costa MS (Rusch), PARichaC  Pediatric Blood and Marrow Transplant  [facility]  Pager [phone]  [facility] Phone: [phone]  [facility] Fax: [phone]

## 2018-06-28 ENCOUNTER — TRANSFERRED RECORDS (OUTPATIENT)
Dept: HEALTH INFORMATION MANAGEMENT | Facility: CLINIC | Age: 18
End: 2018-06-28

## 2018-07-03 ENCOUNTER — CARE COORDINATION (OUTPATIENT)
Dept: TRANSPLANT | Facility: CLINIC | Age: 18
End: 2018-07-03

## 2018-09-05 ENCOUNTER — CARE COORDINATION (OUTPATIENT)
Dept: TRANSPLANT | Facility: CLINIC | Age: 18
End: 2018-09-05

## 2018-09-06 ENCOUNTER — TELEPHONE (OUTPATIENT)
Dept: CARE COORDINATION | Facility: CLINIC | Age: 18
End: 2018-09-06

## 2018-09-06 NOTE — TELEPHONE ENCOUNTER
Call to Artemio. Introduced myself as his transplant . He confirmed that he knows that depending on the results of his bone marrow biopsy he'll start transplant work-up on 9/17/18. He said the plans to continue to live with his grandparents. He said that his grandparents, mother, father and stepmother will all act as his transplant caregivers. After our call I sent him an email containing my contact information per his request. I explained that I also left his brother/donor a message requesting a call back next week to discuss plans his travel to [place].

## 2018-09-10 ENCOUNTER — TELEPHONE (OUTPATIENT)
Dept: CARE COORDINATION | Facility: CLINIC | Age: 18
End: 2018-09-10

## 2018-09-10 NOTE — TELEPHONE ENCOUNTER
Phone call to Artemio in follow-up to our call last week. I explained that I wanted to confirm Artemio's transplant caregiver plan and his preference for how our team should be communicating information with him and his family members. Artemio told me that he hasn't received news about the results of his recent bone marrow biopsy. I noted that our team is continuing with tentative plans for his pre-transplant work-up evaluation pending results of the biopsy.  I asked Artemio if he'd seen the email sent by Daria Jara, Nurse Coordinator, to him, his mother, father and stepmother this morning in response to calls that Daria received from his mother and stepmother this morning. Artemio said that he had not read the email message yet. We discussed plans for patient-family-care team communication, transplant caregiver, donor travel and Artemio's post-transplant lodging. Artemio is aware that if he proceed with transplant we will be meeting in person next week.  Communication Plans:  Artemio told me several times that he wants members of our team to always contact him directly and prior to contacting his family members about information related to his medical condition and plan of care. He said that when we talk with him he will let us know if he would like us to also call family members with updates.   I noted that Artemio has signed a Release of Information form authorizing information sharing with family members but our team wants to have a clear plan for how to safely and efficiently communicate information in a manner that is consistent with Artemio's preferences.  Artemio said that we should continue to contact him and he will either update his family members directly or will talk with us about how to share information with his family members.  Artemio has signed a Release of Information form (dated 4/6/2018) authorizing our team to share information with his father, Dangelo Nino, his paternal grandmother,  "Arielle Nino, his stepmother, Camryn Meyer, his mother, Mervat Villaseñor, and his sister and brother, Kamilah and Link Nino.  We agreed to talk further about communication plans directly with the family members during the work-up evaluation.  I also asked Artemio about how to best reach him by phone and noted that today his outgoing voicemail message indicated that he has not set up his voicemail box. I explained that during transplant our team will need a reliable way to reach him and/or his caregiver(s) in a rapid manner. I gave examples of staff needing to reach him about test results and care needs quickly in the post-transplant portion of his care plan. He said that he'd previously contacted [facility] and \"they didn't know why I had problems setting up voicemail.\" Today he told me that he will follow through with this and set up voicemail.  Transplant Caregiver Plan:  Last week Artemio told me that he will have an adult caregiver present with him at all times post-transplant when he is out of the hospital. Today we reviewed this information.  Artemio told me that his paternal grandparents and his father will be his primary caregivers. He said that his grandparents will likely be with him \"24/7\". They are retired and will be residing in their [facility] home with Artemio rather than at their [facility].  Artemio said that is his grandparents are not available at some times his father will be with him either at his home in [place] or at the grandparents' home. He told me several times that he will have an adult with him around the clock. He also said that if there are ever problems with this he will talk with me as needed to problem-solve re: developing a new caregiver plan.  He said that his mother will also be a caregiver back-up for the grandparents.  Artemio said that his mother, father and grandmother will likely all accompany him to his pre-transplant work-up evaluation " appointments next week. We agreed to review the caregiver and communication plans in their presence.  Donor travel Plans:  I told Artemio that I haven't heard back from his brother since leaving him a message last week. Artemio said that he hasn't heard from him either. I told him that I will call him again to discuss his questions/needs regarding travel and the psychosocial issues related to his role as a donor.   Post-transplant lodging plans:  Artemio said that he will be living at his paternal grandparents' home ([id number] [id number]) post-transplant.

## 2018-10-03 ENCOUNTER — CARE COORDINATION (OUTPATIENT)
Dept: TRANSPLANT | Facility: CLINIC | Age: 18
End: 2018-10-03

## 2018-10-18 ENCOUNTER — MEDICAL CORRESPONDENCE (OUTPATIENT)
Dept: TRANSPLANT | Facility: CLINIC | Age: 18
End: 2018-10-18

## 2018-10-19 ENCOUNTER — MEDICAL CORRESPONDENCE (OUTPATIENT)
Dept: TRANSPLANT | Facility: CLINIC | Age: 18
End: 2018-10-19

## 2018-10-22 ENCOUNTER — APPOINTMENT (OUTPATIENT)
Dept: RADIOLOGY | Facility: CLINIC | Age: 18
End: 2018-10-22
Attending: PHYSICIAN ASSISTANT
Payer: COMMERCIAL

## 2018-10-22 ENCOUNTER — ALLIED HEALTH/NURSE VISIT (OUTPATIENT)
Dept: TRANSPLANT | Facility: CLINIC | Age: 18
End: 2018-10-22
Attending: PEDIATRICS
Payer: COMMERCIAL

## 2018-10-22 ENCOUNTER — HOSPITAL ENCOUNTER (OUTPATIENT)
Dept: GENERAL RADIOLOGY | Facility: CLINIC | Age: 18
End: 2018-10-22
Attending: NURSE PRACTITIONER
Payer: COMMERCIAL

## 2018-10-22 ENCOUNTER — RESULTS ONLY (OUTPATIENT)
Dept: OTHER | Facility: CLINIC | Age: 18
End: 2018-10-22

## 2018-10-22 ENCOUNTER — INFUSION THERAPY VISIT (OUTPATIENT)
Dept: INFUSION THERAPY | Facility: CLINIC | Age: 18
End: 2018-10-22
Attending: PEDIATRICS
Payer: COMMERCIAL

## 2018-10-22 ENCOUNTER — ONCOLOGY VISIT (OUTPATIENT)
Dept: TRANSPLANT | Facility: CLINIC | Age: 18
End: 2018-10-22
Attending: NURSE PRACTITIONER
Payer: COMMERCIAL

## 2018-10-22 ENCOUNTER — HOSPITAL ENCOUNTER (OUTPATIENT)
Dept: CARDIOLOGY | Facility: CLINIC | Age: 18
End: 2018-10-22
Attending: PEDIATRICS
Payer: COMMERCIAL

## 2018-10-22 ENCOUNTER — ALLIED HEALTH/NURSE VISIT (OUTPATIENT)
Dept: CARE COORDINATION | Facility: CLINIC | Age: 18
End: 2018-10-22

## 2018-10-22 ENCOUNTER — HOSPITAL ENCOUNTER (OUTPATIENT)
Dept: CT IMAGING | Facility: CLINIC | Age: 18
Discharge: HOME | End: 2018-10-22
Attending: PEDIATRICS | Admitting: NURSE PRACTITIONER
Payer: COMMERCIAL

## 2018-10-22 ENCOUNTER — OFFICE VISIT (OUTPATIENT)
Dept: PEDIATRIC CARDIOLOGY | Facility: CLINIC | Age: 18
End: 2018-10-22
Attending: PEDIATRICS
Payer: COMMERCIAL

## 2018-10-22 ENCOUNTER — HOSPITAL ENCOUNTER (OUTPATIENT)
Dept: CT IMAGING | Facility: CLINIC | Age: 18
End: 2018-10-22
Attending: PEDIATRICS
Payer: COMMERCIAL

## 2018-10-22 VITALS
TEMPERATURE: 98 F | OXYGEN SATURATION: 97 % | HEART RATE: 61 BPM | HEIGHT: 69 IN | BODY MASS INDEX: 20.54 KG/M2 | WEIGHT: 138.67 LBS | SYSTOLIC BLOOD PRESSURE: 120 MMHG | RESPIRATION RATE: 18 BRPM | DIASTOLIC BLOOD PRESSURE: 72 MMHG

## 2018-10-22 VITALS
HEART RATE: 61 BPM | OXYGEN SATURATION: 97 % | SYSTOLIC BLOOD PRESSURE: 120 MMHG | HEIGHT: 69 IN | DIASTOLIC BLOOD PRESSURE: 72 MMHG | WEIGHT: 138.67 LBS | TEMPERATURE: 98 F | RESPIRATION RATE: 18 BRPM | BODY MASS INDEX: 20.54 KG/M2

## 2018-10-22 DIAGNOSIS — Z86.2 PERSONAL HISTORY OF DISEASES OF BLOOD AND BLOOD-FORMING ORGANS: ICD-10-CM

## 2018-10-22 DIAGNOSIS — Z76.82 BONE MARROW TRANSPLANT CANDIDATE: ICD-10-CM

## 2018-10-22 DIAGNOSIS — C91.01 ACUTE LYMPHOBLASTIC LEUKEMIA (ALL) IN REMISSION (H): Primary | ICD-10-CM

## 2018-10-22 DIAGNOSIS — Z71.9 ENCOUNTER FOR COUNSELING: Primary | ICD-10-CM

## 2018-10-22 DIAGNOSIS — I45.6 WPW (WOLFF-PARKINSON-WHITE SYNDROME): Primary | ICD-10-CM

## 2018-10-22 DIAGNOSIS — C91.00 PRE B-CELL ACUTE LYMPHOBLASTIC LEUKEMIA (ALL) (H): Primary | ICD-10-CM

## 2018-10-22 DIAGNOSIS — I45.6 WOLFF-PARKINSON-WHITE (WPW) SYNDROME: ICD-10-CM

## 2018-10-22 LAB
ABO + RH BLD: NORMAL
ABO + RH BLD: NORMAL
ALBUMIN SERPL-MCNC: 4.3 G/DL (ref 3.4–5)
ALBUMIN UR-MCNC: 30 MG/DL
ALP SERPL-CCNC: 34 U/L (ref 40–150)
ALT SERPL W P-5'-P-CCNC: 29 U/L (ref 0–70)
ANION GAP SERPL CALCULATED.3IONS-SCNC: 6 MMOL/L (ref 3–14)
APPEARANCE UR: CLEAR
APTT PPP: 36 SEC (ref 22–37)
AST SERPL W P-5'-P-CCNC: 37 U/L (ref 0–45)
BACTERIA #/AREA URNS HPF: ABNORMAL /HPF
BASOPHILS # BLD AUTO: 0 10E9/L (ref 0–0.2)
BASOPHILS NFR BLD AUTO: 0.2 %
BILIRUB SERPL-MCNC: 0.5 MG/DL (ref 0.2–1.3)
BILIRUB UR QL STRIP: NEGATIVE
BLD GP AB SCN SERPL QL: NORMAL
BLOOD BANK CMNT PATIENT-IMP: NORMAL
BUN SERPL-MCNC: 12 MG/DL (ref 7–30)
CALCIUM SERPL-MCNC: 8.9 MG/DL (ref 8.5–10.1)
CHLORIDE SERPL-SCNC: 108 MMOL/L (ref 94–109)
CO2 SERPL-SCNC: 26 MMOL/L (ref 20–32)
COLOR UR AUTO: YELLOW
CONFIRMATION TYPING RECIPIENT: NORMAL
CREAT SERPL-MCNC: 0.81 MG/DL (ref 0.66–1.25)
DIFFERENTIAL METHOD BLD: ABNORMAL
EOSINOPHIL # BLD AUTO: 0 10E9/L (ref 0–0.7)
EOSINOPHIL NFR BLD AUTO: 0.4 %
ERYTHROCYTE [DISTWIDTH] IN BLOOD BY AUTOMATED COUNT: 13.7 % (ref 10–15)
GFR SERPL CREATININE-BSD FRML MDRD: >90 ML/MIN/1.7M2
GLUCOSE SERPL-MCNC: 89 MG/DL (ref 70–99)
GLUCOSE UR STRIP-MCNC: NEGATIVE MG/DL
HCT VFR BLD AUTO: 28.4 % (ref 40–53)
HGB BLD-MCNC: 9.5 G/DL (ref 13.3–17.7)
HGB UR QL STRIP: NEGATIVE
IMM GRANULOCYTES # BLD: 0.1 10E9/L (ref 0–0.4)
IMM GRANULOCYTES NFR BLD: 0.9 %
INR PPP: 1.11 (ref 0.86–1.14)
KETONES UR STRIP-MCNC: 5 MG/DL
LEUKOCYTE ESTERASE UR QL STRIP: NEGATIVE
LYMPHOCYTES # BLD AUTO: 0.2 10E9/L (ref 0.8–5.3)
LYMPHOCYTES NFR BLD AUTO: 4.3 %
MCH RBC QN AUTO: 28.7 PG (ref 26.5–33)
MCHC RBC AUTO-ENTMCNC: 33.5 G/DL (ref 31.5–36.5)
MCV RBC AUTO: 86 FL (ref 78–100)
MONOCYTES # BLD AUTO: 1 10E9/L (ref 0–1.3)
MONOCYTES NFR BLD AUTO: 18.1 %
MUCOUS THREADS #/AREA URNS LPF: PRESENT /LPF
NEUTROPHILS # BLD AUTO: 4 10E9/L (ref 1.6–8.3)
NEUTROPHILS NFR BLD AUTO: 76.1 %
NITRATE UR QL: NEGATIVE
NRBC # BLD AUTO: 0 10*3/UL
NRBC BLD AUTO-RTO: 1 /100
PH UR STRIP: 5.5 PH (ref 5–7)
PLATELET # BLD AUTO: 214 10E9/L (ref 150–450)
POTASSIUM SERPL-SCNC: 3.9 MMOL/L (ref 3.4–5.3)
PROT SERPL-MCNC: 7.3 G/DL (ref 6.8–8.8)
RBC # BLD AUTO: 3.31 10E12/L (ref 4.4–5.9)
RBC #/AREA URNS AUTO: 2 /HPF (ref 0–2)
SODIUM SERPL-SCNC: 140 MMOL/L (ref 133–144)
SOURCE: ABNORMAL
SP GR UR STRIP: 1.03 (ref 1–1.03)
SPECIMEN EXP DATE BLD: NORMAL
URATE SERPL-MCNC: 3.7 MG/DL (ref 3.5–7.2)
UROBILINOGEN UR STRIP-MCNC: NORMAL MG/DL (ref 0–2)
WBC # BLD AUTO: 5.3 10E9/L (ref 4–11)
WBC #/AREA URNS AUTO: 4 /HPF (ref 0–5)

## 2018-10-22 PROCEDURE — 81376 HLA II TYPING 1 LOCUS LR: CPT | Performed by: PEDIATRICS

## 2018-10-22 PROCEDURE — 86704 HEP B CORE ANTIBODY TOTAL: CPT | Performed by: PEDIATRICS

## 2018-10-22 PROCEDURE — 85730 THROMBOPLASTIN TIME PARTIAL: CPT | Performed by: PEDIATRICS

## 2018-10-22 PROCEDURE — 87516 HEPATITIS B DNA AMP PROBE: CPT | Performed by: PEDIATRICS

## 2018-10-22 PROCEDURE — 87535 HIV-1 PROBE&REVERSE TRNSCRPJ: CPT | Performed by: PEDIATRICS

## 2018-10-22 PROCEDURE — 83021 HEMOGLOBIN CHROMOTOGRAPHY: CPT | Performed by: PEDIATRICS

## 2018-10-22 PROCEDURE — 70486 CT MAXILLOFACIAL W/O DYE: CPT

## 2018-10-22 PROCEDURE — 40000268 ZZH STATISTIC NO CHARGES: Mod: ZF

## 2018-10-22 PROCEDURE — 86780 TREPONEMA PALLIDUM: CPT | Performed by: PEDIATRICS

## 2018-10-22 PROCEDURE — 86803 HEPATITIS C AB TEST: CPT | Performed by: PEDIATRICS

## 2018-10-22 PROCEDURE — 81001 URINALYSIS AUTO W/SCOPE: CPT | Performed by: PEDIATRICS

## 2018-10-22 PROCEDURE — 86696 HERPES SIMPLEX TYPE 2 TEST: CPT | Performed by: PEDIATRICS

## 2018-10-22 PROCEDURE — 71250 CT THORAX DX C-: CPT

## 2018-10-22 PROCEDURE — 86790 VIRUS ANTIBODY NOS: CPT | Performed by: PEDIATRICS

## 2018-10-22 PROCEDURE — 84550 ASSAY OF BLOOD/URIC ACID: CPT | Performed by: PEDIATRICS

## 2018-10-22 PROCEDURE — 00000345 ZZHCL STATISTIC REV BONE MARROW OUTSIDE SLIDES TC 88321: Performed by: PEDIATRICS

## 2018-10-22 PROCEDURE — 81370 HLA I & II TYPING LR: CPT | Mod: 91 | Performed by: PEDIATRICS

## 2018-10-22 PROCEDURE — 86901 BLOOD TYPING SEROLOGIC RH(D): CPT | Performed by: PEDIATRICS

## 2018-10-22 PROCEDURE — 81370 HLA I & II TYPING LR: CPT | Performed by: PEDIATRICS

## 2018-10-22 PROCEDURE — G0463 HOSPITAL OUTPT CLINIC VISIT: HCPCS | Mod: ZF

## 2018-10-22 PROCEDURE — 36591 DRAW BLOOD OFF VENOUS DEVICE: CPT

## 2018-10-22 PROCEDURE — 80053 COMPREHEN METABOLIC PANEL: CPT | Performed by: PEDIATRICS

## 2018-10-22 PROCEDURE — G0463 HOSPITAL OUTPT CLINIC VISIT: HCPCS | Mod: 25,27

## 2018-10-22 PROCEDURE — 86900 BLOOD TYPING SEROLOGIC ABO: CPT | Performed by: PEDIATRICS

## 2018-10-22 PROCEDURE — 85610 PROTHROMBIN TIME: CPT | Performed by: PEDIATRICS

## 2018-10-22 PROCEDURE — 85025 COMPLETE CBC W/AUTO DIFF WBC: CPT | Performed by: PEDIATRICS

## 2018-10-22 PROCEDURE — 86665 EPSTEIN-BARR CAPSID VCA: CPT | Performed by: PEDIATRICS

## 2018-10-22 PROCEDURE — 25000128 H RX IP 250 OP 636: Mod: ZF

## 2018-10-22 PROCEDURE — 87798 DETECT AGENT NOS DNA AMP: CPT | Performed by: PEDIATRICS

## 2018-10-22 PROCEDURE — 87633 RESP VIRUS 12-25 TARGETS: CPT | Performed by: NURSE PRACTITIONER

## 2018-10-22 PROCEDURE — 86695 HERPES SIMPLEX TYPE 1 TEST: CPT | Performed by: PEDIATRICS

## 2018-10-22 PROCEDURE — 87521 HEPATITIS C PROBE&RVRS TRNSC: CPT | Performed by: PEDIATRICS

## 2018-10-22 PROCEDURE — 81265 STR MARKERS SPECIMEN ANAL: CPT | Performed by: PEDIATRICS

## 2018-10-22 PROCEDURE — 86850 RBC ANTIBODY SCREEN: CPT | Performed by: PEDIATRICS

## 2018-10-22 PROCEDURE — 87340 HEPATITIS B SURFACE AG IA: CPT | Performed by: PEDIATRICS

## 2018-10-22 PROCEDURE — 87389 HIV-1 AG W/HIV-1&-2 AB AG IA: CPT | Performed by: PEDIATRICS

## 2018-10-22 PROCEDURE — 86644 CMV ANTIBODY: CPT | Performed by: PEDIATRICS

## 2018-10-22 PROCEDURE — 93306 TTE W/DOPPLER COMPLETE: CPT

## 2018-10-22 RX ORDER — HEPARIN SODIUM (PORCINE) LOCK FLUSH IV SOLN 100 UNIT/ML 100 UNIT/ML
SOLUTION INTRAVENOUS
Status: COMPLETED
Start: 2018-10-22 | End: 2018-10-22

## 2018-10-22 RX ORDER — SULFAMETHOXAZOLE/TRIMETHOPRIM 800-160 MG
2 TABLET ORAL
Refills: 0 | Status: ON HOLD | COMMUNITY
Start: 2018-10-22 | End: 2018-11-25

## 2018-10-22 RX ORDER — HEPARIN SODIUM (PORCINE) LOCK FLUSH IV SOLN 100 UNIT/ML 100 UNIT/ML
5 SOLUTION INTRAVENOUS
Status: DISCONTINUED | OUTPATIENT
Start: 2018-10-22 | End: 2018-10-22 | Stop reason: HOSPADM

## 2018-10-22 RX ADMIN — SODIUM CHLORIDE, PRESERVATIVE FREE 5 ML: 5 INJECTION INTRAVENOUS at 10:55

## 2018-10-22 RX ADMIN — HEPARIN SODIUM (PORCINE) LOCK FLUSH IV SOLN 100 UNIT/ML 5 ML: 100 SOLUTION at 10:55

## 2018-10-22 ASSESSMENT — PAIN SCALES - GENERAL: PAINLEVEL: NO PAIN (0)

## 2018-10-22 NOTE — PROGRESS NOTES
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    History of Present Illness  Artemio was diagnosed on 2/5/18 during work-up for nonexertional syncope. WBC on diagnosis was 8,000, CNS negative. Cytogenetics showed loss of IKZF1, LDA pos for Ph-like ALL, CRLF2 low, Fusion [id number] - activating JAK2. He underwent fertility preservation with sperm collection and then began induction chemotherapy per [id number] on 2/15/18. Day 8 LP was negative, Day 8 MRD 5.2%. His end of induction marrow on 3/15/18 showed 31% blasts per [facility] lab, 8% by morphology and was determined have shown induction failure. Following this he received Inotuzumab and iT Methotrexate with post-evaluations revealing  MRD 0.25%, HR consolidative treatment per [id number] (5/4-6/22) with post-evaluations revealing MRD 0.056% , off-study interim maintenance (7/11-8/22) with post-evaluations revealing MRD 0.014%, and Etoposide, Cytoxan, IT Methotrexate, and Ruxolitinib with post and latest evaluations (10/15/18) revealing MRD 0.006% per flow cytometry.     Disease and treatment associated complications have been fairly minimal. No known organ compromise, no remarkable infectious history. He required one transfusion of platelets for an estimated count of 11/12k- no significant history nor perceived bleeding susceptibility. Artemio has maintained his appetite and nutritional status decently throughout treatment without supplementation via enteral feeds or TPN. Current medications inclusive of Bactrim and Ruxolitinib, which are both well tolerated.     Of important note, Artemio's medical history is also significant for Aaron-Parkinson-White,  confirmed upon EKG at around the time of diagnosis/induction. Artemio and family state the cardiology team had no recommendations nor interventions, as Artemio, aside from presentation, has been asymptomatic.     ROS: A complete review of systems is negative except as noted in HPI    Past Medical History  B cell ALL  Palomino-Parkinson-White syndrome  L comminuted and shorted mid-clavicular fracture at age 14 (soccer injury)  L fibular closed stress fracture at age 16 (soccer injury)    Past Surgical History  L clavicle ORIF with Lila locking plate and 7-hole with 6 screws and 1 lag screw - plate remains  Placement of venous port (large hub, 6.6 Spanish), left subclavian vein - 2/15/18    Family History  Maternal grandfather: thrombocytopenia  Paternal grandfather: Prostate Cancer, type 2 diabetes.    Social History  Lives with grandmother and grandfather. Mother is involved and has moved back. Father lives in the area. Artemio finished high school and some college. He worked as a  with his father occasionally.    Medications   Ruxolitinib 65 mg PO BID  Bactrim 800-160 mg PO Mon/Tues BID    Allergies   Allergies   Allergen Reactions     No Known Drug Allergies      Physical Exam   Vital Signs for Peds 10/22/2018   SYSTOLIC 120   DIASTOLIC 72   PULSE 61   TEMPERATURE 98   RESPIRATIONS 18   WEIGHT (kg) 62.9 kg   HEIGHT (cm) 175 cm   BMI 20.58   PEAK FLOW    pain    O2 97     General: Awake in clinic room, relaxed, pleasant. NAD. Mother and father present.   HEENT: NC/AT, alopecic.  No conjunctivitis, sclera anicteric, nares patent. Mucous membranes moist. Lips dry. Dentition normal with some yellowing.   CV: RRR. Normal S1 and S2. No murmurs, rubs or gallops, Warm, well-perfused.    Resp: CTAB. Normal work and rate of breathing. No wheezing or crackles.  Abd: Soft, nondistended.  Neuro: Alert and appropriate response, CN II-XII grossly intact, Normal strength and tone throughout.    MSK: Moving all extremities  equally. No peripheral edema. No swelling or erythema.    Skin: No rashes, bruising or petechiae.    Access: Port     Labs  CBC RESULTS:   Recent Labs   Lab Test  10/22/18   1048   WBC  5.3   RBC  3.31*   HGB  9.5*   HCT  28.4*   MCV  86   MCH  28.7   MCHC  33.5   RDW  13.7   PLT  214     Last Comprehensive Metabolic Panel:  Sodium   Date Value Ref Range Status   10/22/2018 140 133 - 144 mmol/L Final     Potassium   Date Value Ref Range Status   10/22/2018 3.9 3.4 - 5.3 mmol/L Final     Chloride   Date Value Ref Range Status   10/22/2018 108 94 - 109 mmol/L Final     Carbon Dioxide   Date Value Ref Range Status   10/22/2018 26 20 - 32 mmol/L Final     Anion Gap   Date Value Ref Range Status   10/22/2018 6 3 - 14 mmol/L Final     Glucose   Date Value Ref Range Status   10/22/2018 89 70 - 99 mg/dL Final     Urea Nitrogen   Date Value Ref Range Status   10/22/2018 12 7 - 30 mg/dL Final     Creatinine   Date Value Ref Range Status   10/22/2018 0.81 0.66 - 1.25 mg/dL Final     GFR Estimate   Date Value Ref Range Status   10/22/2018 >90 >60 mL/min/1.7m2 Final     Comment:     Non  GFR Calc     Calcium   Date Value Ref Range Status   10/22/2018 8.9 8.5 - 10.1 mg/dL Final     Imaging  CT FACIAL BONES WITHOUT CONTRAST 10/22/2018 1:11 PM     Provided History: ; Personal history of diseases of blood and  blood-forming organs  ICD-10: Personal history of diseases of blood and blood-forming organs      Comparison: None available.     Technique:  Using thin collimation multidetector helical acquisition  technique, axial, coronal, and sagittal thin section CT images were  reconstructed through the paranasal sinuses. Images were reviewed in  bone and soft tissue windows.     Findings:   Scattered polypoid mucosal thickening within the left frontal sinus,  posterior left ethmoid air cells and along the floors of the maxillary  sinuses, left greater than right. Sphenoid sinuses are clear. Patent  major sinus outflow  tracts. No intrasinus fluid or significant  osteitis. No inflammatory changes in the soft tissues about the  paranasal sinuses. Orbits and visualized intracranial contents are  unremarkable. No facial bone fracture or malalignment. Mastoid air  cells are clear.     Impression:     Mild scattered paranasal sinus polypoid mucosal thickening. No  specific findings of acute or invasive fungal sinus infection.     I have personally reviewed the examination and initial interpretation  and I agree with the findings.     MICHELE DEL TORO MD    EXAMINATION: CT CHEST W/O CONTRAST, 10/22/2018 1:11 PM     CLINICAL HISTORY: ALL     COMPARISON: None available.     TECHNIQUE: CT through the chest without contrast.     FINDINGS:  Mediastinum: Left-sided Port-A-Cath terminating in low SVC. No thyroid  nodules. Central tracheobronchial tree is patent. Heart size is  normal. No pericardial effusion.  Normal thoracic vasculature. No  thoracic lymphadenopathy. Trace pneumomediastinum.     Lungs: No consolidation. No pleural effusion or pneumothorax.     Bones and soft tissues: No suspicious bone findings. Status post left  clavicular ORIF with plate and screw fixation.     Upper abdomen: The appearance of the upper abdomen is unremarkable.     IMPRESSION:   1. Trace pneumomediastinum, likely benign spontaneous.  2. Clear lungs.     I have personally reviewed the examination and initial interpretation  and I agree with the findings.     ROLANDO TRINIDAD MD    Assessment and Plan   Artemio is a 18 year old male with VHR B cell ALL + JAK2 activation, now in remission and undergoing work up for anticipated matched sibling bone marrow transplant. Clinically well appearing.     BMT:  # High risk B cell ALL (CNS negative) + JAK2 activation/BMT: Latest bone marrow biopsy (10/15) MRD 0.006%  - Exit conference scheduled for 10/24  - Line placement scheduled for 10/26  - Radiation therapy consult scheduled for 10/25  - Continue Ruxolitinib until  admission  - Conditioning per 2015-29: TBI days -4 through -1 followed by anticipated MSD transplant on 11/2     # Risk for GVHD:   - Post-transplant Cytoxan days +3 and +4  - Tacrolimus and MMF to start day +5    FEN/Renal:  # Risk for malnutrition: no history of TPN or enteral nutrition dependence   - Dietician consult upon admission, to follow   - Continue age appropriate diet     # Risk for electrolyte abnormalities:  - check daily electrolytes upon admission     # Risk for renal dysfunction and fluid overload:  - monitor I/O's and daily weights upon admission     # Risk for aHUS/TA-TMA: surveillance to initiate upon admission   - monitor LDH qMonday  - monitor urine protein/creatinine qTuesday    Pulmonary:  # Risk for pulmonary toxicity secondary to chemotherapy:   - PFTs scheduled for 10/24    # Rhinorrhea: 3 mos history, mild. Sinus CT (10/22) with findings of mucosal thickening without evidence of active infection  - Obtain RVP, pending     # Pneumomediastinum: finding upon work up Chest CT (10/22), asymptomatic. Per radiology, likely benign, perhaps transient. Pulmonary recommends further review with radiology.   - Will coordinate image review with radiology  - Patient instructed to notify care team with any SOB, dyspnea, or remarkable cough    Cardiovascular:  # Aaron-Parkinson-White Syndrome: diagnosed upon syncope in 02/2018. Asymptomatic, no interventions to date. Work up EKG c/w WPW, sinus bradycardia at 49 bpm. Work up ECHO normal with EF 68%.   - Cardiology visit with Dr. Plummer (10/22): Holter monitor applied, will be reviewed by the end of the week. Repeat ECHO in 6 mos.   - For future reference, Isael's HR has been measured at high 30s while asleep during previous hospitalizations.     Heme:   # Pancytopenia secondary to chemotherapy: hx of one platelet transfusion, well tolerated  - Transfuse for hemoglobin < 8, platelets < 10,000 (<50,000 prior to line placement Friday)  - No previous  premedications, monitor for need  - GCSF to start day +5 until ANC >1500 x3    Infectious Disease:  # Risk for infection given immunocompromised status: IDMs pending  Active: 3 mos hx of mild rhinorrhea. Chest/sinus CT without evidence of infection.  - Obtain RVP, pending    Prophylaxis:        - viral prophylaxis: contingent upon CMV and HSV recpt and donor status, pending   - fungal prophylaxis: high risk- to start Micafungin upon admission   - bacterial prophylaxis: Continue Bactrim until admission     Past infections: none significant     GI:   # Nausea management:   - Scheduled medications: zofran gtt to initiate upon TBI  - PRN medications: consider ativan and benadryl     # Risk for VOD: especially high risk given previous Inotuzumab therapy  - Ursodiol TID (10 ml/kg)  - Consider ppx defibrotide -- yet to be determined     Neuro:  # Mucositis/pain (anticipated):   - Analgesic therapy as indicated  - Integrative therapy     Disposition: Artemio will continue with work up week as planned. Please see calendar for detailed appointment list.     Patient Active Problem List   Diagnosis     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)     Aaron-Parkinson-White (WPW) syndrome     Bone marrow transplant candidate

## 2018-10-22 NOTE — MR AVS SNAPSHOT
After Visit Summary   10/22/2018    Artemio Nino    MRN: [id number]           Patient Information     Date Of Birth          2000        Visit Information        Provider Department      10/22/2018 2:49 PM [place]        Today's Diagnoses     Encounter for counseling    -  1       Follow-ups after your visit        Your next 10 appointments already scheduled     Oct 24, 2018 10:00 AM CDT   Pharm D Consult with [facility] Peds Bmt Pharm D, [facility]   Peds Blood and Marrow Transplant ([facility])    [place] [address] [address]   [phone]            Oct 24, 2018 11:00 AM CDT   [facility] Bmt Peds Work Up with Sid Girard MD   Peds Blood and Marrow Transplant ([facility])    [address] [id number] [phone]            Oct 25, 2018  8:00 AM CDT   CONSULT with Ester Early MD   [facility] Radiation Therapy ([facility])    [address] [id number] [phone]            Oct 25, 2018 10:00 AM CDT   SOCIAL WORK with [facility] PEDS BMT    Peds Blood and Marrow Transplant ([facility])    [address] [id number] [phone]            Oct 26, 2018  8:30 AM CDT   (Arrive by 7:30 AM)   IR CVC TUNNEL PLACEMENT > 5 YRS OF AGE with URIR1, UR IR RAD   [facility] [place],  Interventional Radiology ([facility])    [address] [place] [id number] [phone]           The day before the exam:   You may eat your regular diet.   You are encouraged to drink at least 8 eight ounce glasses of clear liquids.  Please wear loose clothing, such as a sweat suit or jogging clothes. Avoid snaps, zippers and other metal. We may ask you to undress and put on a  [facility].  Please bring any scans or X-rays taken at other hospitals, if similar tests were done. Also bring a list of your medicines, including vitamins, minerals and over-the-counter drugs. It is safest to leave personal items at home.  Someone will need to drive you to and from the hospital.  Tell your doctor in advance:   If you have allergies to x-ray contrast or iodine.   If you are or may be pregnant.   If you are taking Coumadin (or any other blood thinners) 5 days prior to the exam for any special instructions.   If you are diabetic to determine if your insulin needs have to be adjusted for the exam.  Your doctor will:   Need to do a history and physical within 30 days before this procedure.   Obtain necessary laboratory tests prior to the exam (Basic Metabolic Panel, CBCP, PTT and INR)   (No labs needed if you are having a tunneled catheter exchange or removal)  If you were given sedation, you cannot drive for 24 hours after the procedure, and an adult must be with you until then.  If you have any questions, please call the Imaging Department where you will have your exam. Directions, parking instructions, and other information are available on our website, [email or web address].            Oct 26, 2018   Procedure with Rex Valente MD   [facility] Sedation Observation ([facility])    [address] [facility] [address] [phone]           The [facility] is located in the [facility]. lt is easily accessible from virtually any point in the [facility] area, via [address]              Who to contact     If you have questions or need follow up information about today's clinic visit or your schedule please contact UR CASE MANAGEMENT directly at No information on file..  Normal or non-critical lab and imaging results will be communicated to you by MyChart, letter or phone within 4 business days after the clinic has received the results. If you  "do not hear from us within 7 days, please contact the clinic through [employer] or phone. If you have a critical or abnormal lab result, we will notify you by phone as soon as possible.  Submit refill requests through [employer] or call your pharmacy and they will forward the refill request to us. Please allow 3 business days for your refill to be completed.          Additional Information About Your Visit        [employer] Information     [employer] lets you send messages to your doctor, view your test results, renew your prescriptions, schedule appointments and more. To sign up, go to [email or web address] . Click on \"Log in\" on the left side of the screen, which will take you to the Welcome page. Then click on \"Sign up Now\" on the right side of the page.     You will be asked to enter the access code listed below, as well as some personal information. Please follow the directions to create your username and password.     Your access code is: [email or web address]  Expires: 2019 10:57 AM     Your access code will  in 90 days. If you need help or a new code, please call your [place] clinic or [phone].        Care EveryWhere ID     This is your Care EveryWhere ID. This could be used by other organizations to access your [place] medical records  [id number]         Blood Pressure from Last 3 Encounters:   10/22/18 120/72   10/22/18 120/72   04/10/06 98/54    Weight from Last 3 Encounters:   10/22/18 62.9 kg (138 lb 10.7 oz)   10/22/18 62.9 kg (138 lb 10.7 oz)   06 27 kg (59 lb 8 oz) (57 %)*     * Growth percentiles are based on CDC 2-20 Years data.              Today, you had the following     No orders found for display         Today's Medication Changes          These changes are accurate as of 10/22/18 11:59 PM.  If you have any questions, ask your nurse or doctor.               Stop taking these medicines if you haven't already. Please contact your care team if you have questions.     TYLENOL PO   Stopped " by:  Jacqueline Fitzgerald NP                    Primary Care Provider Office Phone # Fax #    Viky Zavala [phone]       [facility] [address]        Equal Access to Services     MIKELANGLE MAGALIE : Hadkhurram errol palacios rashidao Soomaali, waaxda luqadaha, qaybta kaalmada adeegyada, jessy millern tao frias lamarlenyzay . So [facility] [phone].    ATENCIÓN: Si habla español, tiene a london disposición servicios gratuitos de asistencia lingüística. Llame al [phone].    We comply with applicable federal civil rights laws and [place] laws. We do not discriminate on the basis of race, color, national origin, age, disability, sex, sexual orientation, or gender identity.            Thank you!     Thank you for choosing  CASE MANAGEMENT  for your care. Our goal is always to provide you with excellent care. Hearing back from our patients is one way we can continue to improve our services. Please take a few minutes to complete the written survey that you may receive in the mail after your visit with us. Thank you!             Your Updated Medication List - Protect others around you: Learn how to safely use, store and throw away your medicines at www.disposemymeds.org.          This list is accurate as of 10/22/18 11:59 PM.  Always use your most recent med list.                   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

## 2018-10-22 NOTE — NURSING NOTE
"Chief Complaint   Patient presents with     Consult     high risk ALL     /72 (BP Location: Right arm, Patient Position: Chair, Cuff Size: Adult Regular)  Pulse 61  Temp 98  F (36.7  C) (Oral)  Resp 18  Ht 5' 8.9\" (175 cm)  Wt 138 lb 10.7 oz (62.9 kg)  SpO2 97%  BMI 20.54 kg/m2    Radha Rutledge LPN    "

## 2018-10-22 NOTE — PROGRESS NOTES
Keith Note:    Discussed about Artemio with team (Jacqueline Fitzgerald and Daria Jara). He is a 18 y/o M with VHR pre-B ALL, CNS negative,  JAK2 +, undergoing workup for matched sibling donor hematopoietic stem cell transplant. Will follow up on his work-up scheduled for today and tomorrow and exit conference schedule for 10/24.    Sid Girard MD    Pediatric Blood and Marrow Transplant   [facility]  Pager: [phone]

## 2018-10-22 NOTE — MR AVS SNAPSHOT
After Visit Summary   10/22/2018    Artemio Nino    MRN: [id number]           Patient Information     Date Of Birth          2000        Visit Information        Provider Department      10/22/2018 3:00 PM Ju Plummer MD Peds Cardiology        Today's Diagnoses     WPW (Aaron-Parkinson-White syndrome)    -  1      Care Instructions      PEDS CARDIOLOGY  [facility] [address] [place] [address] [phone]      Cardiology Clinic  [phone]  RN Care Coordinator, Maryan Addison (Bre)  [phone]  Pediatric Call Center/Scheduling  [phone]    After Hours and Emergency Contact Number  [phone]  * Ask for the pediatric cardiologist on call         Prescription Renewals  The pharmacy must fax requests to [phone]  * Please allow 3-4 days for prescriptions to be authorized               Follow-ups after your visit        Follow-up notes from your care team     Return in about 6 months (around 4/22/2019).      Your next 10 appointments already scheduled     Oct 22, 2018  3:00 PM CDT   New Patient Visit with Ju Plummer MD   Peds Cardiology ([facility])    [facility] [address] [place] [address] [phone]            Oct 23, 2018  8:00 AM CDT   NM RENAL GFR DPTA STUDY NON IMAGING with URNM1   [facility], [place], Nuclear Medicine ([facility])    [address] [place] [address] [phone]           How do I prepare for my exam? (Food and drink instructions) Day 1 & Day 2: Stop all caffeine 12 hours before the test. This includes coffee, tea, soda pop, chocolate and certain medicines (such as Anacin, Excedrin and NoDoz). Also avoid decaf coffee and tea, as these contain small amounts of caffeine. Stop eating 4 hours before the test. You may drink water.  How do I prepare for my exam? (Other  instructions) You may need to stop some medicines before the test. Follow your doctor s orders. Day 1 & Day 2: *If you take a beta blocker: Do not take your beta-blocker on the day before your test, unless specifically told to by your doctor. And do not take it on the day of your test. Bring it with you to take after the test.  *If you take Aggrenox or dipyridamole (Persantine, Permole), stop taking it 48 hours before your test. *If you take Viagra, Cialis or Levitra, stop taking it 48 hours before your test. *If you take theophylline or aminophylline, stop taking it 12 hours before your test.  For patients with diabetes: *If you take insulin, call your diabetes care team. Ask if you should take a 1/2 dose the morning of your test. *If you take diabetes medicine by mouth, don t take it on the morning of your test. Bring it with you to take after the test. (If you have questions, call your diabetes care team.)  *Do not take nitrates on the day of your test. Do not wear your Nitro-Patch. *No alcohol, smoking or other tobacco for 12 hours before the test.  What should I wear: Please wear a loose two-piece outfit. If you will have an exercise test, bring rubber-soled walking shoes.  How long does the exam take: *This test can take 1-2 days.* ONE day exam: Allow 3-4 hours for test. IF TWO day exam: Allow 30-60 minutes PER day for test.  What should I bring: Please bring a list of your medicines (including vitamins, minerals and over-the-counter drugs). Leave your valuables at home.  Do I need a :  No  is needed.  What do I need to tell my doctor? When you arrive, please tell us if you: * Have diabetes * Are breastfeeding * May be pregnant * Have a pacemaker of ICD (implantable defibrillator).  What should I do after the exam: No restrictions, You may resume normal activities.  What is this test: Your doctor has ordered a nuclear stress test to check how well blood is flowing through your heart. You will  either exercise or take a medicine that mimics exercise; we will watch your heart.  Who should I call with questions: If you have any questions, please call the Imaging Department where you will have your exam. Directions, parking instructions, and other information is available on our website, [email or web address].            Oct 23, 2018  8:30 AM CDT   [facility] Bmt Nurse Coord with [facility] PEDS BMT NURSE COORDINATOR   Peds Blood and Marrow Transplant ([facility])    [facility]  [address]th [address] [address]   [phone]            Oct 23, 2018 10:00 AM CDT   Research with [facility] PEDS BMT NURSE   Peds Blood and Marrow Transplant ([facility])    [facility]  [address]th [address] [address]   [phone]            Oct 23, 2018  1:00 PM CDT   FULL BATTERY PFT VISIT with [facility] PFT LAB   Peds Pulmonary Function Lab ([facility])    [place] [id number] [facility], [address] [place], [place] [address] [address] [address]   [phone]            Oct 23, 2018  2:00 PM CDT   SOCIAL WORK with [facility] PEDS BMT    Peds Blood and Marrow Transplant ([facility])    [place] [address] [address]   [phone]            Oct 24, 2018 10:00 AM CDT   Pharm D Consult with [facility] Peds Bmt Pharm D, [facility]   Peds Blood and Marrow Transplant ([facility])    [facility]  [address]th [address] [address]   [phone]            Oct 24, 2018 11:00 AM CDT   [facility] Bmt Peds Work Up with Sid Girard MD   Peds Blood and Marrow Transplant ([facility])    [facility]  9th Floor  [address] [place] [address]   [phone]            Oct 25, 2018  8:00 AM CDT   CONSULT with Ester Early MD   [facility] Radiation Therapy ([facility])    [address]  [address] [phone]            Oct 26, 2018  8:30 AM CDT   (Arrive by 7:30 AM)   IR CVC TUNNEL PLACEMENT > 5 YRS OF AGE with URIR1   [facility]  Interventional [facility] ([facility])    [address] [facility] [id number] [phone]           The day before the exam:   You may eat your regular diet.   You are encouraged to drink at least 8 eight ounce glasses of clear liquids.  Please wear loose clothing, such as a sweat suit or jogging clothes. Avoid snaps, zippers and other metal. We may ask you to undress and put on a hospital gown.  Please bring any scans or X-rays taken at other hospitals, if similar tests were done. Also bring a list of your medicines, including vitamins, minerals and over-the-counter drugs. It is safest to leave personal items at home.  Someone will need to drive you to and from the hospital.  Tell your doctor in advance:   If you have allergies to x-ray contrast or iodine.   If you are or may be pregnant.   If you are taking Coumadin (or any other blood thinners) 5 days prior to the exam for any special instructions.   If you are diabetic to determine if your insulin needs have to be adjusted for the exam.  Your doctor will:   Need to do a history and physical within 30 days before this procedure.   Obtain necessary laboratory tests prior to the exam (Basic Metabolic Panel, CBCP, PTT and INR)   (No labs needed if you are having a tunneled catheter exchange or removal)  If you were given sedation, you cannot drive for 24 hours after the procedure, and an adult must be with you until then.  If you have any questions, please call the Imaging Department where you will have your exam. Directions, parking instructions, and other information are available on our website, [email or web address].              Who to contact     Please call your clinic at [phone] to:    Ask questions about your health    Make or cancel  "appointments    Discuss your medicines    Learn about your test results    Speak to your doctor            Additional Information About Your Visit        MyChart Information     [employer] is an electronic gateway that provides easy, online access to your medical records. With [employer], you can request a clinic appointment, read your test results, renew a prescription or communicate with your care team.     To sign up for [employer] visit the website at [email or web address]   You will be asked to enter the access code listed below, as well as some personal information. Please follow the directions to create your username and password.     Your access code is: [email or web address]  Expires: 2019 10:57 AM     Your access code will  in 90 days. If you need help or a new code, please contact your [facility] or call [phone] for assistance.        Care EveryWhere ID     This is your Care EveryWhere ID. This could be used by other organizations to access your [facility] medical records  [id number]        Your Vitals Were     Pulse Temperature Respirations Height Pulse Oximetry BMI (Body Mass Index)    61 98  F (36.7  C) (Oral) 18 5' 8.9\" (175 cm) 97% 20.54 kg/m2       Blood Pressure from Last 3 Encounters:   10/22/18 120/72   10/22/18 120/72   04/10/06 98/54    Weight from Last 3 Encounters:   10/22/18 138 lb 10.7 oz (62.9 kg)   10/22/18 138 lb 10.7 oz (62.9 kg)   06 59 lb 8 oz (27 kg) (57 %)*     * Growth percentiles are based on CDC 2-20 Years data.              We Performed the Following     Zio Patch 24 Hours - Pediatric        Primary Care Provider Office Phone # Fax #    Viky Zavala [phone] [address] [facility] [address]        Equal Access to Services     CHARY MEJIAS : Celia Carlos, wafrancisco javier luqradhika, qaybta kaalmada patricia, jessy madera. [facility] [phone].    ATENCIÓN: Si " iván esposito, tiene a london disposición servicios gratuitos de asistencia lingüística. Rita vizcaino [phone].    We comply with applicable federal civil rights laws and [place] laws. We do not discriminate on the basis of race, color, national origin, age, disability, sex, sexual orientation, or gender identity.            Thank you!     [facility]  for your care. Our goal is always to provide you with excellent care. Hearing back from our patients is one way we can continue to improve our services. Please take a few minutes to complete the written survey that you may receive in the mail after your visit with us. Thank you!             Your Updated Medication List - Protect others around you: Learn how to safely use, store and throw away your medicines at www.disposemymeds.org.          This list is accurate as of 10/22/18  2:53 PM.  Always use your most recent med list.                   Brand Name Dispense Instructions for use Diagnosis    TYLENOL PO      None Entered    Acute pharyngitis

## 2018-10-22 NOTE — MR AVS SNAPSHOT
After Visit Summary   10/22/2018    Artemio Nino    MRN: [id number]           Patient Information     Date Of Birth          2000        Visit Information        Provider Department      10/22/2018 10:30 AM [facility]AIR 5 Peds IV Infusion        Today's Diagnoses     Personal history of diseases of blood and blood-forming organs           Follow-ups after your visit        Your next 10 appointments already scheduled     Oct 22, 2018 11:30 AM CDT   [facility] Bmt Nurse Coord [facility] P[facility] NURSE COORDINATOR   Peds Blood and Marrow Transplant ([facility])    [facility]  9th Floor  [address] [facility] [id number] [phone]            Oct 22, 2018  1:00 PM CDT   CT CHEST W/O CONTRAST with URCT1   [facility], [place], Radiology ([facility])    [address] [facility] [id number] [phone]           How do I prepare for my exam? (Food and drink instructions) No Food and Drink Restrictions.  How do I prepare for my exam? (Other instructions) You do not need to do anything special to prepare for this exam. For a sinus scan: Use your nose spray (nasal decongestant spray) as directed.  What should I wear: Please wear loose clothing, such as a sweat suit or jogging clothes. Avoid snaps, zippers and other metal. We may ask you to undress and put on a hospital gown.  How long does the exam take: Most scans take less than 20 minutes.  What should I bring: Please bring any scans or X-rays taken at other hospitals, if similar tests were done. Also bring a list of your medicines, including vitamins, minerals and over-the-counter drugs. It is safest to leave personal items at home.  Do I need a : No  is needed.  What do I need to tell my doctor? Be sure to tell your doctor: * If you have any allergies. * If there s any chance you are pregnant. * If you are breastfeeding.  What should I do  after the exam: No restrictions, You may resume normal activities.  What is this test: A CT (computed tomography) scan is a series of pictures that allows us to look inside your body. The scanner creates images of the body in cross sections, much like slices of bread. This helps us see any problems more clearly.  Who should I call with questions: If you have any questions, please call the Imaging Department where you will have your exam. Directions, parking instructions, and other information is available on our website, [email or web address].            Oct 22, 2018  1:15 PM CDT   CT FACIAL BONES WITHOUT CONTRAST with URCT1   [facility], [place], Radiology ([facility])    [facility] [address] [phone]           How do I prepare for my exam? (Food and drink instructions) No Food and Drink Restrictions.  How do I prepare for my exam? (Other instructions) You do not need to do anything special to prepare for this exam. For a sinus scan: Use your nose spray (nasal decongestant spray) as directed.  What should I wear: Please wear loose clothing, such as a sweat suit or jogging clothes. Avoid snaps, zippers and other metal. We may ask you to undress and put on a hospital gown.  How long does the exam take: Most scans take less than 20 minutes.  What should I bring: Please bring any scans or X-rays taken at other hospitals, if similar tests were done. Also bring a list of your medicines, including vitamins, minerals and over-the-counter drugs. It is safest to leave personal items at home.  Do I need a : No  is needed.  What do I need to tell my doctor? Be sure to tell your doctor: * If you have any allergies. * If there s any chance you are pregnant. * If you are breastfeeding.  What should I do after the exam: No restrictions, You may resume normal activities.  What is this test: A CT (computed tomography) scan is a series of pictures  that allows us to look inside your body. The scanner creates images of the body in cross sections, much like slices of bread. This helps us see any problems more clearly.  Who should I call with questions: If you have any questions, please call the Imaging Department where you will have your exam. Directions, parking instructions, and other information is available on our website, [email or web address].            Oct 22, 2018  2:00 PM CDT   Ech Pediatric Complete with URECHPR1   [facility] Echo/EKG ([facility])    [address] [place] [address]               Oct 22, 2018  3:00 PM CDT   New Patient Visit with Ju Plummer MD   Peds Cardiology ([facility])    [facility] 12th Fl  [address] [address] [phone]            Oct 23, 2018  8:00 AM CDT   NM RENAL GFR DPTA STUDY NON IMAGING with URNM1   [facility], [place], Nuclear Medicine ([facility])    [address] [place] [address] [phone]           How do I prepare for my exam? (Food and drink instructions) Day 1 & Day 2: Stop all caffeine 12 hours before the test. This includes coffee, tea, soda pop, chocolate and certain medicines (such as Anacin, Excedrin and NoDoz). Also avoid decaf coffee and tea, as these contain small amounts of caffeine. Stop eating 4 hours before the test. You may drink water.  How do I prepare for my exam? (Other instructions) You may need to stop some medicines before the test. Follow your doctor s orders. Day 1 & Day 2: *If you take a beta blocker: Do not take your beta-blocker on the day before your test, unless specifically told to by your doctor. And do not take it on the day of your test. Bring it with you to take after the test.  *If you take Aggrenox or dipyridamole (Persantine, Permole), stop taking it 48 hours before your test. *If you take Viagra, Cialis or Levitra, stop  taking it 48 hours before your test. *If you take theophylline or aminophylline, stop taking it 12 hours before your test.  For patients with diabetes: *If you take insulin, call your diabetes care team. Ask if you should take a 1/2 dose the morning of your test. *If you take diabetes medicine by mouth, don t take it on the morning of your test. Bring it with you to take after the test. (If you have questions, call your diabetes care team.)  *Do not take nitrates on the day of your test. Do not wear your Nitro-Patch. *No alcohol, smoking or other tobacco for 12 hours before the test.  What should I wear: Please wear a loose two-piece outfit. If you will have an exercise test, bring rubber-soled walking shoes.  How long does the exam take: *This test can take 1-2 days.* ONE day exam: Allow 3-4 hours for test. IF TWO day exam: Allow 30-60 minutes PER day for test.  What should I bring: Please bring a list of your medicines (including vitamins, minerals and over-the-counter drugs). Leave your valuables at home.  Do I need a :  No  is needed.  What do I need to tell my doctor? When you arrive, please tell us if you: * Have diabetes * Are breastfeeding * May be pregnant * Have a pacemaker of ICD (implantable defibrillator).  What should I do after the exam: No restrictions, You may resume normal activities.  What is this test: Your doctor has ordered a nuclear stress test to check how well blood is flowing through your heart. You will either exercise or take a medicine that mimics exercise; we will watch your heart.  Who should I call with questions: If you have any questions, please call the Imaging Department where you will have your exam. Directions, parking instructions, and other information is available on our website, "[email or web address].            Oct 23, 2018  8:30 AM CDT   [facility] Bmt Nurse Coord with [facility] PEDS BMT NURSE COORDINATOR   Peds Blood and Marrow Transplant ([facility])    [facility]  [facility]  9th Floor  [address] [facility] [address]   [phone]            Oct 23, 2018 10:00 AM CDT   Research with [facility] PEDS BMT NURSE   Peds Blood and Marrow Transplant ([facility])    [facility]  9th Floor  [address] [facility] [address]   [phone]            Oct 23, 2018  1:00 PM CDT   FULL BATTERY PFT VISIT with [facility] PFT LAB   Peds Pulmonary Function Lab ([facility])    [facility]  [address] 3rd [address]  [address] [facility] [address]   [phone]            Oct 23, 2018  2:00 PM CDT   SOCIAL WORK with [facility] PEDS BMT    Peds Blood and Marrow Transplant ([facility])    [facility]  9th Floor  [address] [facility] [address] [phone]              Who to contact     Please call your clinic at [phone] to:    Ask questions about your health    Make or cancel appointments    Discuss your medicines    Learn about your test results    Speak to your doctor            Additional Information About Your Visit        OpenCloudhart Information     [employer] is an electronic gateway that provides easy, online access to your medical records. With [email or web address], you can request a clinic appointment, read your test results, renew a prescription or communicate with your care team.     To sign up for [employer] visit the website at [email or web address]   You will be asked to enter the access code listed below, as well as some personal information. Please follow the directions to create your username and password.     Your access code is: [email or web address]  Expires: 2019 10:57 AM     Your access code will  in 90 days. If you need help or a new code, please contact your [facility] or call [phone] for assistance.        Care EveryWhere ID     This is your Care EveryWhere ID. This could be used by other organizations to access your [facility]  "records  [id number]         Blood Pressure from Last 3 Encounters:   10/22/18 120/72   04/10/06 98/54   07/29/04 98/60    Weight from Last 3 Encounters:   10/22/18 62.9 kg (138 lb 10.7 oz)   09/07/06 27 kg (59 lb 8 oz) (57 %)*   04/10/06 26.2 kg (57 lb 12 oz) (60 %)*     * Growth percentiles are based on [facility] 2-20 Years data.              We Performed the Following     ABO/Rh type and screen     CBC with platelets differential     CMV Antibody IgG - BMT recipient     Comprehensive metabolic panel     Confirmation Typing Recipient     EBV Capsid Antibody IgG - BMT recipient     EKG 12 lead - pediatric     HBV HCV HIV WNV by KENDRA-BMT recipient     Hemoglobin S     Hepatitis B core antibody-BMT recipient     Hepatitis B surface antigen-BMT recipient     Hepatitis C antibody     Herpes Simplex Virus 1 and 2 IgG     HIV Antigen Antibody Combo - BMT recipient     HTLV I and 2 antibody with reflex-BMT recipient     If Allo: Pre BMT Polymorphism Determination Recep. Molecular Diagnostic Lab: (If double cord deisi slip, \"Double Cord Transplant\")     INR     Partial thromboplastin time     Routine UA with microscopic     Treponema Abs w Reflex to RPR and Titer     Trypanosoma Cruzi-BMT recipient     Uric acid        Primary Care Provider    None Specified       No primary provider on file.        Equal Access to Services     CHARY MEJIAS AH: Celia Carlos, scott crawley, mega gomez, jessy madera. [facility] [phone].    ATENCIÓN: Si habla español, tiene a london disposición servicios gratuitos de asistencia lingüística. Llame al [phone].    We comply with applicable federal civil rights laws and [place] laws. We do not discriminate on the basis of race, color, national origin, age, disability, sex, sexual orientation, or gender identity.            Thank you!     Thank you for choosing PEDS IV INFUSION  for your care. Our goal is always to provide you with " excellent care. Hearing back from our patients is one way we can continue to improve our services. Please take a few minutes to complete the written survey that you may receive in the mail after your visit with us. Thank you!             Your Updated Medication List - Protect others around you: Learn how to safely use, store and throw away your medicines at www.disposemymeds.org.          This list is accurate as of 10/22/18 10:57 AM.  Always use your most recent med list.                   Brand Name Dispense Instructions for use Diagnosis    TYLENOL PO      None Entered    Acute pharyngitis

## 2018-10-22 NOTE — PROGRESS NOTES
2018      Maddison Centeno MD  [address] [place] [address]    Name: Artemio Nino  MRN: [id number]  : 2000      Dear Dr. Centeno,    I was pleased to see 18 year old Artemio Nino in Pediatric Cardiology Clinic at the [facility] on 10/22/18 for evaluation of cardiac status in preparation for BMT.  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 He was diagnosed with this in 2018 during evaluation for nonexertional syncope. He began induction chemotherapy per [id number] on 2/15/18. 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 Artemio reports that he has only fainted once, after a receiving a shot.  He denies feeling like he is going to faint, he denies palpitations.  He is otherwise a healthy young man who, before the diagnosis of ALL, was active in soccer and tennis and kept up with his peers.  He was evaluated by Dr. Figueroa Leger of [facility] and a 3-day Holter performed that does not appear to have been included in the transferred records.  Dr. Leger deferred further study of Artemio at that time.    Artemio's end of induction marrow on 3/15/18 showed 31% blasts per [place] lab, 8% by morphology and was determined have shown induction failure. Following this he received Inotuzumab and iT Methotrexate with post-evaluations revealing  MRD 0.25%, HR consolidative treatment per [id number] (-) with post-evaluations revealing MRD 0.056% , off-study interim maintenance (-) with post-evaluations revealing MRD 0.014%, and Etoposide, Cytoxan, IT Methotrexate, and Ruxolitinib with post and latest evaluations (10/15/18) revealing MRD 0.006% per flow cytometry.  He was referred to Dr. Zavala for stem cell transplantation.    Past medical history is unremarkable except for clavicular and fibular fractures  "in 2016 and 2018.    Family history is remarkable for the paternal grandfather, a non-smoker, who had a myocardial infarction at age 47 or 48.  Tanya's  father has hypertension.  Artemio's maternal grandmother has hypertension and his maternal grandfather has had bypass surgery at age 76.  Ash has two healthy sibs; his older brother will be his donor.       Current medications include:  Current Outpatient Prescriptions   Medication     TYLENOL OR     No current facility-administered medications for this visit.        Current known allergies include:  Allergies   Allergen Reactions     No Known Drug Allergies        Vital signs:  Vitals:    10/22/18 1408   BP: 120/72   BP Location: Right arm   Patient Position: Chair   Cuff Size: Adult Regular   Pulse: 61   Resp: 18   Temp: 98  F (36.7  C)   TempSrc: Oral   SpO2: 97%   Weight: 138 lb 10.7 oz (62.9 kg)   Height: 5' 8.9\" (175 cm)     Normalized stature-for-age data not available for patients older than 20 years.  Wt Readings from Last 3 Encounters:   10/22/18 138 lb 10.7 oz (62.9 kg)   10/22/18 138 lb 10.7 oz (62.9 kg)   09/07/06 59 lb 8 oz (27 kg) (57 %)*     * Growth percentiles are based on CDC 2-20 Years data.     Ht Readings from Last 2 Encounters:   10/22/18 5' 8.9\" (175 cm)   10/22/18 5' 8.9\" (175 cm)     Normalized BMI data available only for age 0 to 20 years.    Physical Examination:  On physical exam today Artemio was a cheerful young man in no distress.  Chest was clear to auscultation. Cardiac exam revealed normal first and second heart sounds.  The second heart sound was normal in intensity.  No murmur rub or gallop was heard.  Hepatic edge was at the costal margin.  Pulses were 2+ in right upper and right lower extremities.    EKG  The EKG today showed normal sinus rhythm with WPW pattern at a rate of 49 beats/minute.  NM interval was short at 88 msec;  QRS duration prolonged at 114 msec and QTc interval was normal at 402 msec.  QRS axis was " normal at +86 degrees.  There were no ST-T wave changes present.    Cardiac Echo   Normal echocardiogram. There is normal appearance and motion of the tricuspid, mitral, pulmonary and aortic valves. No atrial, ventricular or arterial level shunting. The calculated biplane left ventricular ejection fraction is 68 %. Normal right and left ventricular size and function.    In summary, Artemio has had no symptoms of palpitation, tachycardia of syncope (other than a single likely vagal syncope with getting a shot).  He has normal cardiac function on echo.  [facility] are working [facility] and have placed a ZioPatch on him today.  I will ask Dr. Anthony Garcias to review the tracings, which should be available by end of week, for any concerning rhythms.  If the current and previous monitors are unremarkable, it would be my recommendation to proceed with transplantation.   Artemio  does not require SBE prophylaxis for dental or contaminated procedures.  Artemio may be allowed activity to his own limits.   I did recommend follow-up with an Echo in 6 months.    Thank you for allowing me to participate in Artemio's care.  If you have any questions or concerns, please feel free to contact me.    Sincerely,    Ju Plummer MD, PhD  Professor of Pediatrics  [phone]    Cc: parents of Artemio

## 2018-10-22 NOTE — NURSING NOTE
"Chief Complaint   Patient presents with     RECHECK     Patient here today for BMT work up     /72 (BP Location: Left arm, Patient Position: Fowlers, Cuff Size: Adult Regular)  Pulse 61  Temp 98  F (36.7  C) (Oral)  Resp 18  Ht 1.75 m (5' 8.9\")  Wt 62.9 kg (138 lb 10.7 oz)  SpO2 97%  BMI 20.54 kg/m2  Erinn Underwood, [facility]  October 22, 2018    "

## 2018-10-22 NOTE — PROGRESS NOTES
Patient's port was accessed while in clinic with 20G 3/4 inch needle. Blood return noted and labs drawn as ordered. Port flushed and heparin locked following lab draw. Patient remains accessed for GFR test tomorrow.

## 2018-10-22 NOTE — MR AVS SNAPSHOT
After Visit Summary   10/22/2018    Artemio Nino    MRN: [id number]           Patient Information     Date Of Birth          2000        Visit Information        Provider Department      10/22/2018 9:30 AM Jacqueline Fitzgerald NP Peds Blood and Marrow Transplant        Today's Diagnoses     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)    -  1    Aaron-Parkinson-White (WPW) syndrome        Bone marrow transplant candidate              [facility], 9th floor  [address] [place] [address]  Phone: [phone]  Clinic Hours:   Monday-Friday:   7 am to 5:00 pm   closed weekends and major  holidays     If your fever is 100.5  or greater,   call the clinic during business hours.   After hours call [phone] and ask for the pediatric BMT physician to be paged for you.               Follow-ups after your visit        Your next 10 appointments already scheduled     Oct 23, 2018  8:00 AM CDT   NM RENAL GFR DPTA STUDY NON IMAGING with URNM1   [facility], [place], Nuclear Medicine ([facility])    [address] [place] [id number] [phone]           How do I prepare for my exam? (Food and drink instructions) Day 1 & Day 2: Stop all caffeine 12 hours before the test. This includes coffee, tea, soda pop, chocolate and certain medicines (such as Anacin, Excedrin and NoDoz). Also avoid decaf coffee and tea, as these contain small amounts of caffeine. Stop eating 4 hours before the test. You may drink water.  How do I prepare for my exam? (Other instructions) You may need to stop some medicines before the test. Follow your doctor s orders. Day 1 & Day 2: *If you take a beta blocker: Do not take your beta-blocker on the day before your test, unless specifically told to by your doctor. And do not take it on the day of your test. Bring it with you to take after the test.  *If you take  Aggrenox or dipyridamole (Persantine, Permole), stop taking it 48 hours before your test. *If you take Viagra, Cialis or Levitra, stop taking it 48 hours before your test. *If you take theophylline or aminophylline, stop taking it 12 hours before your test.  For patients with diabetes: *If you take insulin, call your diabetes care team. Ask if you should take a 1/2 dose the morning of your test. *If you take diabetes medicine by mouth, don t take it on the morning of your test. Bring it with you to take after the test. (If you have questions, call your diabetes care team.)  *Do not take nitrates on the day of your test. Do not wear your Nitro-Patch. *No alcohol, smoking or other tobacco for 12 hours before the test.  What should I wear: Please wear a loose two-piece outfit. If you will have an exercise test, bring rubber-soled walking shoes.  How long does the exam take: *This test can take 1-2 days.* ONE day exam: Allow 3-4 hours for test. IF TWO day exam: Allow 30-60 minutes PER day for test.  What should I bring: Please bring a list of your medicines (including vitamins, minerals and over-the-counter drugs). Leave your valuables at home.  Do I need a :  No  is needed.  What do I need to tell my doctor? When you arrive, please tell us if you: * Have diabetes * Are breastfeeding * May be pregnant * Have a pacemaker of ICD (implantable defibrillator).  What should I do after the exam: No restrictions, You may resume normal activities.  What is this test: Your doctor has ordered a nuclear stress test to check how well blood is flowing through your heart. You will either exercise or take a medicine that mimics exercise; we will watch your heart.  Who should I call with questions: If you have any questions, please call the Imaging Department where you will have your exam. Directions, parking instructions, and other information is available on our website, [email or web address].            Oct 23, 2018  8:30  AM CDT   [facility] Bmt Nurse Coord with [facility] PEDS BMT NURSE COORDINATOR   Peds Blood and Marrow Transplant ([facility])    [facility]  9th Floor  [address] [place] [id number] [phone]            Oct 23, 2018  1:00 PM CDT   FULL BATTERY PFT VISIT with [facility] PFT LAB   Peds Pulmonary Function Lab ([facility])    [facility]  [address] 3[address]  [address]  [facility] [id number] [phone]            Oct 23, 2018  2:00 PM CDT   SOCIAL WORK with [facility] PEDS BMT    Peds Blood and Marrow Transplant ([facility])    [facility]  [address]th [address] [id number] [phone]            Oct 24, 2018 10:00 AM CDT   Pharm D Consult with [facility] Peds Bmt Pharm D, [facility]   Peds Blood and Marrow Transplant ([facility])    [facility]  [address]th [address] [phone]            Oct 24, 2018 11:00 AM CDT   [facility] Bmt Peds Work Up with Sid Girard MD   Peds Blood and Marrow Transplant ([facility])    [facility]  9th Floor  [address] [place] [id number] [phone]            Oct 25, 2018  8:00 AM CDT   CONSULT with Ester Early MD   [facility] Radiation Therapy ([facility])    [address] [id number] [phone]            Oct 26, 2018  8:30 AM CDT   (Arrive by 7:30 AM)   IR CVC TUNNEL PLACEMENT > 5 YRS OF AGE with URIR1, UR IR RAD   [facility], [place],  Interventional Radiology ([facility])    [address] [place] [id number] [phone]           The day before the exam:   You may eat your regular diet.   You are encouraged to drink at least 8 eight ounce glasses of clear liquids.  Please wear loose clothing, such as a sweat suit or jogging clothes. Avoid snaps, zippers and other metal. We may  ask you to undress and put on a hospital gown.  Please bring any scans or X-rays taken at other hospitals, if similar tests were done. Also bring a list of your medicines, including vitamins, minerals and over-the-counter drugs. It is safest to leave personal items at home.  Someone will need to drive you to and from the hospital.  Tell your doctor in advance:   If you have allergies to x-ray contrast or iodine.   If you are or may be pregnant.   If you are taking Coumadin (or any other blood thinners) 5 days prior to the exam for any special instructions.   If you are diabetic to determine if your insulin needs have to be adjusted for the exam.  Your doctor will:   Need to do a history and physical within 30 days before this procedure.   Obtain necessary laboratory tests prior to the exam (Basic Metabolic Panel, CBCP, PTT and INR)   (No labs needed if you are having a tunneled catheter exchange or removal)  If you were given sedation, you cannot drive for 24 hours after the procedure, and an adult must be with you until then.  If you have any questions, please call the Imaging Department where you will have your exam. Directions, parking instructions, and other information are available on our website, [email or web address].            Oct 26, 2018   Procedure with Rex Valente MD   [facility] Sedation Observation ([facility])    [address] [facility] [address] [phone]           The [facility] is located in the [facility]. lt is easily accessible from virtually any point in the [facility] area, via Interstate-              Who to contact     Please call your clinic at [phone] to:    Ask questions about your health    Make or cancel appointments    Discuss your medicines    Learn about your test results    Speak to your doctor            Additional Information About Your Visit        MyChart Information     "[employer] is an electronic gateway that  "provides easy, online access to your medical records. With [employer], you can request a clinic appointment, read your test results, renew a prescription or communicate with your care team.     To sign up for [employer] visit the website at [email or web address]   You will be asked to enter the access code listed below, as well as some personal information. Please follow the directions to create your username and password.     Your access code is: [email or web address]  Expires: 2019 10:57 AM     Your access code will  in 90 days. If you need help or a new code, please contact your [facility] or call [phone] for assistance.        Care EveryWhere ID     This is your Care EveryWhere ID. This could be used by other organizations to access your [facility] medical records  [id number]        Your Vitals Were     Pulse Temperature Respirations Height Pulse Oximetry BMI (Body Mass Index)    61 98  F (36.7  C) (Oral) 18 1.75 m (5' 8.9\") 97% 20.54 kg/m2       Blood Pressure from Last 3 Encounters:   10/22/18 120/72   10/22/18 120/72   04/10/06 98/54    Weight from Last 3 Encounters:   10/22/18 62.9 kg (138 lb 10.7 oz)   10/22/18 62.9 kg (138 lb 10.7 oz)   06 27 kg (59 lb 8 oz) (57 %)*     * Growth percentiles are based on [facility] 2-20 Years data.              We Performed the Following     Respiratory Virus Panel by PCR          Today's Medication Changes          These changes are accurate as of 10/22/18  8:20 PM.  If you have any questions, ask your nurse or doctor.               Stop taking these medicines if you haven't already. Please contact your care team if you have questions.     TYLENOL PO   Stopped by:  Jacqueline Fitzgerald NP                    Primary Care Provider Office Phone # Fax #    Viky Zavala [phone] [address] [facility] [address]        Equal Access to Services     CHARY MEJIAS : scott Vernon " mega crawleyheathersebastián lisajessy aceves. So [facility] [phone].    ATENCIÓN: Si iván esposito, tiene a london disposición servicios gratuitos de asistencia lingüística. Llame al [phone].    We comply with applicable federal civil rights laws and [place] laws. We do not discriminate on the basis of race, color, national origin, age, disability, sex, sexual orientation, or gender identity.            Thank you!     [facility]  [facility] your care. Our goal is always to provide you with excellent care. Hearing back from our patients is one way we can continue to improve our services. Please take a few minutes to complete the written survey that you may receive in the mail after your visit with us. Thank you!             Your Updated Medication List - Protect others around you: Learn how to safely use, store and throw away your medicines at www.disposemymeds.org.          This list is accurate as of 10/22/18  8:20 PM.  Always use your most recent med list.                   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

## 2018-10-22 NOTE — PROGRESS NOTES
Patient Name:  Artemio Nino   YOB: 2000  Medical Record Number (MRN):  [id number]  Age:  18 year old male    Referring Provider:  SELF, REFERRED    Reason for Referral:  Interventional Radiology consult.    Requested Intervention:  Central venous catheter placement    Associated Diagnosis:  Leukemia    =====    History of Present Illness: Patient is a pleasant 18 year old gentleman with a history of lymphoblastic leukemia, with existing left-sided central venous chest port in place. Patient now undergoing workup for allogenic stem cell transplant. Patient's team requesting evaluation for tunneled central venous catheter placement.    =====    Past Medical History:  No past medical history on file.    Past Surgical History:  No past surgical history on file.    Family History:  No family history on file.    Social History:  Social History   Substance Use Topics     Smoking status: Never Smoker     Smokeless tobacco: Never Used     Alcohol use Not on file       Problem List:  Patient Active Problem List    Diagnosis Date Noted     NO ACTIVE PROBLEMS 04/10/2006     Priority: Medium       Medications:  Prescription Medications as of 10/22/2018             TYLENOL OR None Entered      Facility Administered Medications as of 10/22/2018             heparin 100 UNIT/ML injection 5 mL (Discontinued) 5 mLs by Intracatheter route every 28 days          Allergies:  Allergies   Allergen Reactions     No Known Drug Allergies        Vital Signs:  Vital signs within normal limits per nursing note.  =====    Results Reviewed:    Complete Blood Count  Lab Results   Component Value Date    WBC 5.3 10/22/2018     Lab Results   Component Value Date    RBC 3.31 10/22/2018     Lab Results   Component Value Date    HGB 9.5 10/22/2018     Lab Results   Component Value Date    HCT 28.4 10/22/2018     Lab Results   Component Value Date     10/22/2018       Metabolic Panel  Lab Results   Component Value Date    NA  140 10/22/2018     Lab Results   Component Value Date    POTASSIUM 3.9 10/22/2018     Lab Results   Component Value Date    CHLORIDE 108 10/22/2018     Lab Results   Component Value Date    ELSA 8.9 10/22/2018     No components found for: CARBONDIOXIDE  Lab Results   Component Value Date    BUN 12 10/22/2018     Lab Results   Component Value Date    GFRESTIMATED >90 10/22/2018     Lab Results   Component Value Date    GLC 89 10/22/2018       Coagulation  Lab Results   Component Value Date    INR 1.11 10/22/2018     Lab Results   Component Value Date    PTT 36 10/22/2018       =====    Imaging Reviewed:  No imaging available for review.     =====    Review of Systems:  (Positive items in bold. Otherwise negative)    Constitutional symptoms:  Negative for fever/chills, fatigue, night sweats, loss of consciousness, anorexia (intermittent, not associated with chemotherapy), myalgia, unexplained weight loss.    Ears, nose, mouth, and throat:  Negative for visual changes, changes in hearing, nose bleeds, tinnitus, trouble swallowing, sore throat, runny nose.    Cardiovascular:  Negative for chest pain, palpitations, dyspnea on exertion, cold extremities.    Respiratory:  Negative for cough, hemoptysis, shortness of breath, pleuritic pain.    Gastrointestinal:  Negative for abdominal pain, nausea/vomiting, constipation, diarrhea, melena, hematochezia.    Genitourinary:  Negative for dysuria, polyuria, hematuria.    Musculoskeletal:  Negative for pain, functional deficit.    Integumentary:  Negative for rashes, lesions, pruritus, wounds, nodules, tumors, discoloration.    Neurological:  Negative for functional deficit, headache, paresthesias, numbness (distal finger tips bilaterally, stable, associated with chemotherapy), weakness, trouble with gait, dizziness, pain.    Endocrine:  Negative for heat or cold intolerance, polydipsia, polyuria.    Psychiatric: Negative for hallucinations, anxiety,  depression.    =====    Physical Exam:     General:  No apparent distress, alert, oriented, afebrile, vital signs within normal limits.    Head, ears, nose, mouth, and throat:  Pupils equally round and anicteric. Head atraumatic/normocephalic, oropharynx clear, neck supple.    Cardiovascular:  S1/S2, regular rate and rhythm, no murmur, rubs, or gallops appreciated.    Respiratory:  Lungs clear to auscultation bilaterally.    Gastrointestinal:  Abdomen is soft, nontender, and nondistended.     Musculoskeletal:  Moves all extremities well. Upper extremity ROM grossly intact bilaterally. Neck ROM grossly intact.    Integumentary:  No erythema, swelling, rash, or lesions appreciated.    Neurological:  Motor and sensory function grossly intact.     =====    RISKS OF THE PROCEDURE:  The risks of the procedure were discussed, including; infection, bleeding, and unintentional damage. The risk of post catheter placement venous thrombus was discussed.    CONSENT:  The patient understood the limitations, alternatives, and risks of the procedure and agreed to the procedure.     SEDATION CONSENT:  It was explained to the patient that medications used for sedation and pain relief may be administered, if needed, to reduce anxiety and discomfort that may be associated with the procedure.  Serious side effects from the medications are rare but may include nausea, temporary altered mental status, temporary amnesia, or respiratory depression.    =====    Sedation plan:  Moderate sedation, utilizing midazolam and fentanyl    Classification of physical status, ASA score:  o   o P3 = A patient with severe systemic disease.       I have reviewed the patient data and determined they are a candidate for sedation.    =====    PATIENT INSTRUCTIONS AND GUIDELINES:    If you are taking the medicine Lovenox (enoxaparin), do not take this medicine 24 hours prior to your scheduled procedure. If you are taking twice daily hold the evening dose  prior to the procedure as well as the morning dose the day of the procedure.    If you are taking the medicine Coumadin (warfarin), stop taking the medicine 5 days prior to your scheduled procedure.    If you are taking a medicine classified as an  anti-platelet inhibitors  (not including aspirin) stop taking the medicine 5 days prior to your scheduled procedure.    If you are taking the medicine Aspirin or Ibuprofen stop taking the medicine 5 days prior to your scheduled procedure.     No solid foods or milk products for 6 hours prior to the procedure.    You may have clear liquids up to 2 hours prior to the procedure.     Antibacterial scrub; 2 times the day before the procedure and once the day of your procedure. Clean mid chest to earlobes, both sides of the neck and chest. Place scrub on wet wash cloth, scrub on and leave for 5 minutes. Wash off with a clean wash cloth and pat dry skin, or shower.     If you or having your procedure in the operating room (OR); arrive in unit 3C at your scheduled arrival time on the day of your procedure. Unit 3C is Same Day Surgery; this is on the third floor of the hospital. Follow the green diamonds on the floor.    If you or having your procedure in interventional radiology (IR); arrive at your scheduled arrival time on the day of your procedure. This is on the second floor of the hospital, located down the cardenas from the main hospital lobby.    =====    ASSESSMENT AND PLAN:  Type of catheter:  9.5 Fr.  Double lumen tunneled power Galvan   Preferred Location:  Right internal jugular vein     Patient is a pleasant 18 year old gentleman with a history of leukemia, now undergoing workup for allogenic stem cell transplant. Patient's team is requesting tunneled central venous catheter placement.    I have reviewed the patient data and determined that the requested procedure is indicated, technically achievable, and that the patient is a candidate for moderate sedation. The  patient is without signs or symptoms of infection at this time.    Team to correct platelet count, with goal of 50k.    Proceed with scheduling for Right internal jugular, 9.5 Fr. Tunneled central venous Galvan catheter  in interventional radiology at the [facility].     Thank you for the consultation and for letting Interventional Radiology help you care for your patient.    Sincerely,    Link Rios PA-C, [facility]  Physician Assistant  Interventional Radiology  [phone] (pager)  [phone] (IR control)    =====    A total of 25 minutes was spent with the patient. Greater than 50% of the time was spent in counseling, education, and coordination of care. All of the patient's questions were answered and concerns were addressed.    CC:  Patient Care Team  SELF, REFERRED

## 2018-10-22 NOTE — MR AVS SNAPSHOT
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   After hours call [phone] and ask for the pediatric BMT physician to be paged for you.               Follow-ups after your visit        Your next 10 appointments already scheduled     Oct 22, 2018 11:30 AM CDT   [facility] Bmt Nurse Coord with [facility] PEDS BMT NURSE COORDINATOR   Peds Blood and Marrow Transplant ([facility])    [facility]  9th Floor  [address] [place] [id number] [phone]            Oct 22, 2018  1:00 PM CDT   CT CHEST W/O CONTRAST with URCT1   [facility] [place], Radiology ([facility])    [address] [place] [phone]           How do I prepare for my exam? (Food and drink instructions) No Food and Drink Restrictions.  How do I prepare for my exam? (Other instructions) You do not need to do anything special to prepare for this exam. For a sinus scan: Use your nose spray (nasal decongestant spray) as directed.  What should I wear: Please wear loose clothing, such as a sweat suit or jogging clothes. Avoid snaps, zippers and other metal. We may ask you to undress and put on a hospital gown.  How long does the exam take: Most scans take less than 20 minutes.  What should I bring: Please bring any scans or X-rays  taken at other hospitals, if similar tests were done. Also bring a list of your medicines, including vitamins, minerals and over-the-counter drugs. It is safest to leave personal items at home.  Do I need a : No  is needed.  What do I need to tell my doctor? Be sure to tell your doctor: * If you have any allergies. * If there s any chance you are pregnant. * If you are breastfeeding.  What should I do after the exam: No restrictions, You may resume normal activities.  What is this test: A CT (computed tomography) scan is a series of pictures that allows us to look inside your body. The scanner creates images of the body in cross sections, much like slices of bread. This helps us see any problems more clearly.  Who should I call with questions: If you have any questions, please call the Imaging Department where you will have your exam. Directions, parking instructions, and other information is available on our website, [email or web address].            Oct 22, 2018  1:15 PM CDT   CT FACIAL BONES WITHOUT CONTRAST with URCT1   [facility]    [address] [place] [id number] [phone]           How do I prepare for my exam? (Food and drink instructions) No Food and Drink Restrictions.  How do I prepare for my exam? (Other instructions) You do not need to do anything special to prepare for this exam. For a sinus scan: Use your nose spray (nasal decongestant spray) as directed.  What should I wear: Please wear loose clothing, such as a sweat suit or jogging clothes. Avoid snaps, zippers and other metal. We may ask you to undress and put on a hospital gown.  How long does the exam take: Most scans take less than 20 minutes.  What should I bring: Please bring any scans or X-rays taken at other hospitals, if similar tests were done. Also bring a list of your medicines, including vitamins, minerals and over-the-counter  drugs. It is safest to leave personal items at home.  Do I need a : No  is needed.  What do I need to tell my doctor? Be sure to tell your doctor: * If you have any allergies. * If there s any chance you are pregnant. * If you are breastfeeding.  What should I do after the exam: No restrictions, You may resume normal activities.  What is this test: A CT (computed tomography) scan is a series of pictures that allows us to look inside your body. The scanner creates images of the body in cross sections, much like slices of bread. This helps us see any problems more clearly.  Who should I call with questions: If you have any questions, please call the Imaging Department where you will have your exam. Directions, parking instructions, and other information is available on our website, [email or web address].            Oct 22, 2018  2:00 PM CDT   Ech Pediatric Complete with SUAD   [facility] Echo/EKG ([facility])    [address] [place] [phone]               Oct 22, 2018  3:00 PM CDT   New Patient Visit with Ju Plummer MD   Peds Cardiology ([facility])    [facility] [address] [address] [phone]            Oct 23, 2018  8:00 AM CDT   NM RENAL GFR DPTA STUDY NON IMAGING with URNM1   [facility] [place], Nuclear Medicine ([facility])    [address] [place] [address] [phone]           How do I prepare for my exam? (Food and drink instructions) Day 1 & Day 2: Stop all caffeine 12 hours before the test. This includes coffee, tea, soda pop, chocolate and certain medicines (such as Anacin, Excedrin and NoDoz). Also avoid decaf coffee and tea, as these contain small amounts of caffeine. Stop eating 4 hours before the test. You may drink water.  How do I prepare for my exam? (Other instructions) You may need to stop some medicines before the  test. Follow your doctor s orders. Day 1 & Day 2: *If you take a beta blocker: Do not take your beta-blocker on the day before your test, unless specifically told to by your doctor. And do not take it on the day of your test. Bring it with you to take after the test.  *If you take Aggrenox or dipyridamole (Persantine, Permole), stop taking it 48 hours before your test. *If you take Viagra, Cialis or Levitra, stop taking it 48 hours before your test. *If you take theophylline or aminophylline, stop taking it 12 hours before your test.  For patients with diabetes: *If you take insulin, call your diabetes care team. Ask if you should take a 1/2 dose the morning of your test. *If you take diabetes medicine by mouth, don t take it on the morning of your test. Bring it with you to take after the test. (If you have questions, call your diabetes care team.)  *Do not take nitrates on the day of your test. Do not wear your Nitro-Patch. *No alcohol, smoking or other tobacco for 12 hours before the test.  What should I wear: Please wear a loose two-piece outfit. If you will have an exercise test, bring rubber-soled walking shoes.  How long does the exam take: *This test can take 1-2 days.* ONE day exam: Allow 3-4 hours for test. IF TWO day exam: Allow 30-60 minutes PER day for test.  What should I bring: Please bring a list of your medicines (including vitamins, minerals and over-the-counter drugs). Leave your valuables at home.  Do I need a :  No  is needed.  What do I need to tell my doctor? When you arrive, please tell us if you: * Have diabetes * Are breastfeeding * May be pregnant * Have a pacemaker of ICD (implantable defibrillator).  What should I do after the exam: No restrictions, You may resume normal activities.  What is this test: Your doctor has ordered a nuclear stress test to check how well blood is flowing through your heart. You will either exercise or take a medicine that mimics exercise; we will  watch your heart.  Who should I call with questions: If you have any questions, please call the Imaging Department where you will have your exam. Directions, parking instructions, and other information is available on our website, [email or web address].            Oct 23, 2018  8:30 AM CDT   [facility] Bmt Nurse Coord with [facility] PEDS BMT NURSE COORDINATOR   Peds Blood and Marrow Transplant ([facility])    [facility]  9th Floor  [address] [facility] [address]   [phone]            Oct 23, 2018 10:00 AM CDT   Research with [facility] PEDS BMT NURSE   Peds Blood and Marrow Transplant ([facility])    [facility]  9th Floor  [address] [facility] [address] [facility] [address] [facility] [id number] [phone]            Oct 23, 2018  1:00 PM CDT   FULL BATTERY PFT VISIT with [facility] PFT LAB   Peds Pulmonary Function Lab ([facility])    [facility]  [address] [facility]  [address] [place] [phone]   [phone]            Oct 23, 2018  2:00 PM CDT   [facility]    Peds Blood and Marrow Transplant ([facility])    [facility]  9th Floor  [address] [facility] [address] [facility] [address] [facility] [id number] [phone]              Who to contact     Please call your clinic at [phone] to:    Ask questions about your health    Make or cancel appointments    Discuss your medicines    Learn about your test results    Speak to your doctor            Additional Information About Your Visit        MyChart Information     [employer] is an electronic gateway that provides easy, online access to your medical records. With [employer], you can request a clinic appointment, read your test results, renew a prescription or communicate with your care team.     To sign up for [employer] visit the website at [email or web address]   You will be asked to enter the access code listed below, as well as some personal information. Please follow the directions to create  your username and password.     Your access code is: [email or web address]  Expires: 2019 10:57 AM     Your access code will  in 90 days. If you need help or a new code, please contact your [facility] or call [phone] for assistance.        Care EveryWhere ID     This is your Care EveryWhere ID. This could be used by other organizations to access your [facility] medical records  [id number]         Blood Pressure from Last 3 Encounters:   10/22/18 120/72   04/10/06 98/54   04 98/60    Weight from Last 3 Encounters:   10/22/18 62.9 kg (138 lb 10.7 oz)   06 27 kg (59 lb 8 oz) (57 %)*   04/10/06 26.2 kg (57 lb 12 oz) (60 %)*     * Growth percentiles are based on [facility] 2-20 Years data.              Today, you had the following     No orders found for display       Primary Care Provider    None Specified       No primary provider on file.        Equal Access to Services     [facility] Hospital[facility]ELIA : Hadii errol palacios hadasho Soomaali, waaxda luqadaha, qaybta kaalmada adefloridayasebastián, jessy palm . So [facility] [phone].    ATENCIÓN: Si habla español, tiene a london disposición servicios gratuitos de asistencia lingüística. Llame al [phone].    We comply with applicable federal civil rights laws and [place] laws. We do not discriminate on the basis of race, color, national origin, age, disability, sex, sexual orientation, or gender identity.            Thank you!     Thank you for choosing PEDS BLOOD AND MARROW TRANSPLANT  for your care. Our goal is always to provide you with excellent care. Hearing back from our patients is one way we can continue to improve our services. Please take a few minutes to complete the written survey that you may receive in the mail after your visit with us. Thank you!             Your Updated Medication List - Protect others around you: Learn how to safely use, store and throw away your medicines at www.disposemymeds.org.          This  list is accurate as of 10/22/18 11:06 AM.  Always use your most recent med list.                   Brand Name Dispense Instructions for use Diagnosis    TYLENOL PO      None Entered    Acute pharyngitis

## 2018-10-22 NOTE — MR AVS SNAPSHOT
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   After hours call [phone] and ask for the pediatric BMT physician to be paged for you.               Follow-ups after your visit        Your next 10 appointments already scheduled     Oct 22, 2018  1:00 PM CDT   CT CHEST W/O CONTRAST with URCT1   [facility], [place], Radiology ([facility])    [facility] [address] [phone]           How do I prepare for my exam? (Food and drink instructions) No Food and Drink Restrictions.  How do I prepare for my exam? (Other instructions) You do not need to do anything special to prepare for this exam. For a sinus scan: Use your nose spray (nasal decongestant spray) as directed.  What should I wear: Please wear loose clothing, such as a sweat suit or jogging clothes. Avoid snaps, zippers and other metal. We may ask you to undress and put on a hospital gown.  How long does the exam take: Most scans take less than 20 minutes.  What should I bring: Please bring any scans or X-rays taken at other hospitals, if similar tests were done. Also bring a list of your medicines, including vitamins, minerals and over-the-counter drugs. It is safest to leave personal items at  home.  Do I need a : No  is needed.  What do I need to tell my doctor? Be sure to tell your doctor: * If you have any allergies. * If there s any chance you are pregnant. * If you are breastfeeding.  What should I do after the exam: No restrictions, You may resume normal activities.  What is this test: A CT (computed tomography) scan is a series of pictures that allows us to look inside your body. The scanner creates images of the body in cross sections, much like slices of bread. This helps us see any problems more clearly.  Who should I call with questions: If you have any questions, please call the Imaging Department where you will have your exam. Directions, parking instructions, and other information is available on our website, [email or web address].            Oct 22, 2018  1:15 PM CDT   CT FACIAL BONES WITHOUT CONTRAST with URCT1   [facility], [place], Radiology ([facility])    [address] [place] [address] [phone]           How do I prepare for my exam? (Food and drink instructions) No Food and Drink Restrictions.  How do I prepare for my exam? (Other instructions) You do not need to do anything special to prepare for this exam. For a sinus scan: Use your nose spray (nasal decongestant spray) as directed.  What should I wear: Please wear loose clothing, such as a sweat suit or jogging clothes. Avoid snaps, zippers and other metal. We may ask you to undress and put on a hospital gown.  How long does the exam take: Most scans take less than 20 minutes.  What should I bring: Please bring any scans or X-rays taken at other hospitals, if similar tests were done. Also bring a list of your medicines, including vitamins, minerals and over-the-counter drugs. It is safest to leave personal items at home.  Do I need a : No  is needed.  What do I need to tell my doctor? Be sure to tell your doctor: * If you have any allergies. *  If there s any chance you are pregnant. * If you are breastfeeding.  What should I do after the exam: No restrictions, You may resume normal activities.  What is this test: A CT (computed tomography) scan is a series of pictures that allows us to look inside your body. The scanner creates images of the body in cross sections, much like slices of bread. This helps us see any problems more clearly.  Who should I call with questions: If you have any questions, please call the Imaging Department where you will have your exam. Directions, parking instructions, and other information is available on our website, [email or web address].            Oct 22, 2018  2:00 PM CDT   Ech Pediatric Complete with URECHPR1   [facility] Echo/EKG ([facility]    [address] [place] [id number]               Oct 22, 2018  3:00 PM CDT   New Patient Visit with Ju Plummer MD   Peds Cardiology ([facility])    [facility] [address] [id number] [phone]            Oct 23, 2018  8:00 AM CDT   NM RENAL GFR DPTA STUDY NON IMAGING with URNM1   [facility], [place], Nuclear Medicine ([facility])    [address] [place] [address] [phone]           How do I prepare for my exam? (Food and drink instructions) Day 1 & Day 2: Stop all caffeine 12 hours before the test. This includes coffee, tea, soda pop, chocolate and certain medicines (such as Anacin, Excedrin and NoDoz). Also avoid decaf coffee and tea, as these contain small amounts of caffeine. Stop eating 4 hours before the test. You may drink water.  How do I prepare for my exam? (Other instructions) You may need to stop some medicines before the test. Follow your doctor s orders. Day 1 & Day 2: *If you take a beta blocker: Do not take your beta-blocker on the day before your test, unless specifically told to by your doctor. And do  not take it on the day of your test. Bring it with you to take after the test.  *If you take Aggrenox or dipyridamole (Persantine, Permole), stop taking it 48 hours before your test. *If you take Viagra, Cialis or Levitra, stop taking it 48 hours before your test. *If you take theophylline or aminophylline, stop taking it 12 hours before your test.  For patients with diabetes: *If you take insulin, call your diabetes care team. Ask if you should take a 1/2 dose the morning of your test. *If you take diabetes medicine by mouth, don t take it on the morning of your test. Bring it with you to take after the test. (If you have questions, call your diabetes care team.)  *Do not take nitrates on the day of your test. Do not wear your Nitro-Patch. *No alcohol, smoking or other tobacco for 12 hours before the test.  What should I wear: Please wear a loose two-piece outfit. If you will have an exercise test, bring rubber-soled walking shoes.  How long does the exam take: *This test can take 1-2 days.* ONE day exam: Allow 3-4 hours for test. IF TWO day exam: Allow 30-60 minutes PER day for test.  What should I bring: Please bring a list of your medicines (including vitamins, minerals and over-the-counter drugs). Leave your valuables at home.  Do I need a :  No  is needed.  What do I need to tell my doctor? When you arrive, please tell us if you: * Have diabetes * Are breastfeeding * May be pregnant * Have a pacemaker of ICD (implantable defibrillator).  What should I do after the exam: No restrictions, You may resume normal activities.  What is this test: Your doctor has ordered a nuclear stress test to check how well blood is flowing through your heart. You will either exercise or take a medicine that mimics exercise; we will watch your heart.  Who should I call with questions: If you have any questions, please call the Imaging Department where you will have your exam. Directions, parking instructions, and other  information is available on our website, [email or web address].            Oct 23, 2018  8:30 AM CDT   [facility] Bmt Nurse Coord with [facility] PEDS BMT NURSE COORDINATOR   Peds Blood and Marrow Transplant ([facility])    [address] [address]   [phone]            Oct 23, 2018 10:00 AM CDT   Research with [facility] PEDS BMT NURSE   Peds Blood and Marrow Transplant ([facility])    [address] [address]   [phone]            Oct 23, 2018  1:00 PM CDT   FULL BATTERY PFT VISIT with [facility] PFT LAB   Peds Pulmonary Function Lab ([facility])    [facility]  [address]  [address] [facility] [address]   [phone]            Oct 23, 2018  2:00 PM CDT   SOCIAL WORK with [facility] PEDS BMT    Peds Blood and Marrow Transplant ([facility])    [address] [address] [phone]            Oct 24, 2018 11:00 AM CDT   [facility] Bmt Peds Work Up with Sid Girard MD   Peds Blood and Marrow Transplant ([facility])    [address] [address] [phone]              Who to contact     Please call your clinic at [phone] to:    Ask questions about your health    Make or cancel appointments    Discuss your medicines    Learn about your test results    Speak to your doctor            Additional Information About Your Visit        MyChart Information     [employer] is an electronic gateway that provides easy, online access to your medical records. With [employer], you can request a clinic appointment, read your test results, renew a prescription or communicate with your care team.     To sign up for [employer] visit the website at [email or web address]   You will be asked to enter the access code listed below, as well as some  personal information. Please follow the directions to create your username and password.     Your access code is: [email or web address]  Expires: 2019 10:57 AM     Your access code will  in 90 days. If you need help or a new code, please contact your [facility] or call [phone] for assistance.        Care EveryWhere ID     This is your Care EveryWhere ID. This could be used by other organizations to access your [facility] medical records  [id number]         Blood Pressure from Last 3 Encounters:   10/22/18 120/72   04/10/06 98/54   04 98/60    Weight from Last 3 Encounters:   10/22/18 62.9 kg (138 lb 10.7 oz)   06 27 kg (59 lb 8 oz) (57 %)*   04/10/06 26.2 kg (57 lb 12 oz) (60 %)*     * Growth percentiles are based on [facility] 2-20 Years data.              Today, you had the following     No orders found for display       Primary Care Provider    None Specified       No primary provider on file.        Equal Access to Services     [facility]: Hadii errol Carlos, waaxda lugamaliel, qaybta kaalmasebastián gomez, jessy palm . So [facility] [phone].    ATENCIÓN: Si habla español, tiene a london disposición servicios gratuitos de asistencia lingüística. Llame al [phone].    We comply with applicable federal civil rights laws and [place] laws. We do not discriminate on the basis of race, color, national origin, age, disability, sex, sexual orientation, or gender identity.            Thank you!     Thank you for choosing PEDS BLOOD AND MARROW TRANSPLANT  for your care. Our goal is always to provide you with excellent care. Hearing back from our patients is one way we can continue to improve our services. Please take a few minutes to complete the written survey that you may receive in the mail after your visit with us. Thank you!             Your Updated Medication List - Protect others around you: Learn how to safely use, store and throw  away your medicines at www.disposemymeds.org.          This list is accurate as of 10/22/18 12:07 PM.  Always use your most recent med list.                   Brand Name Dispense Instructions for use Diagnosis    TYLENOL PO      None Entered    Acute pharyngitis

## 2018-10-22 NOTE — PROGRESS NOTES
BMT Calendar Review    Artemio is a 18 year old male with high-risk ALL referred by Dr. Manjinder Paniagua at [facility] being treated per protocol FR5750-21    Calendar reviewed the morning of Monday, October 22nd with patient and patient's parents discussing dates, times, and locations of workup appointments including the rationale behind each test/procedure/consultation, clarifying family would be contacted with any changes and to disregard instructions to contact the clinic each morning of workup    Person(s) involved in teaching: Mervat Ulloa (mother) and Dangelo (father)  Motivation Level:  - Asks Questions: Yes  - Eager to Learn: Yes  - Cooperative: Yes  - Receptive (willing/able to accept information): Yes    Teaching concerns addressed:  - reviewed remainder of workup calendar, explaining again, all unfamiliar tests and dates/times/locations of tests/procedures/consultations  - instructed not to check with  daily for schedule changes; rather, they'd be contacted by complex , , or nurse coordinator  - reduce infection exposure pre-transplant to minimize risk of unforseen delays    Instructional Materials Used/Given: verbal instruction, copy of work-up and sample calendars, map of campus  - separate binders were given to Mervat jensen, and Dangelo, reassuring them that material would be reviewed in greater detail Tuesday and Wednesday with nurse coordinator and pharmacy's teaches    Encouraged family to call with any additional questions/concerns or afterthoughts, reviewing who to contact for what, referring to providers' business cards, highlighting best way to reach designated nurse coordinator, physician, financial , , and     Plan to continue with workup as delineated by personalized calendar, anticipating exit conference on Wednesday, October 24th with Dr. Sid Girard followed by admission on Sunday, October 28th (line placement is  scheduled for Friday, October 26th)    Screening and blood transfusion consents were signed prior to port access and lab draw - patient's port left accessed for GFR tomorrow at 7:50 as there wasn't an infusion appointment available for port access at 7:30    Daria Jara

## 2018-10-22 NOTE — LETTER
10/22/2018      RE: Artemio Nino  [address] [facility] [address]       2018      Maddison Centeno MD  [facility] [place] [address]    Name: Artemio Nino  MRN: [id number]  : 2000      Dear Dr. Centeno,    I was pleased to see 18 year old Artemio Nino in Pediatric Cardiology Clinic at the [facility] on 10/22/18 for evaluation of cardiac status in preparation for BMT.  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 He was diagnosed with this in 2018 during evaluation for nonexertional syncope. He began induction chemotherapy per [id number] on 2/15/18. 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 Artemio reports that he has only fainted once, after a receiving a shot.  He denies feeling like he is going to faint, he denies palpitations.  He is otherwise a healthy young man who, before the diagnosis of ALL, was active in soccer and tennis and kept up with his peers.  He was evaluated by Dr. Figueroa Leger of [facility] and a 3-day Holter performed that does not appear to have been included in the transferred records.  Dr. Leger deferred further study of Artemio at that time.    Artemio's end of induction marrow on 3/15/18 showed 31% blasts per [facility] lab, 8% by morphology and was determined have shown induction failure. Following this he received Inotuzumab and iT Methotrexate with post-evaluations revealing  MRD 0.25%, HR consolidative treatment per [id number] (-) with post-evaluations revealing MRD 0.056% , off-study interim maintenance (-) with post-evaluations revealing MRD 0.014%, and Etoposide, Cytoxan, IT Methotrexate, and Ruxolitinib with post and latest evaluations (10/15/18) revealing MRD 0.006% per flow cytometry.  He was referred to Dr. Zavala for stem cell  "transplantation.    Past medical history is unremarkable except for clavicular and fibular fractures in 2016 and 2018.    Family history is remarkable for the paternal grandfather, a non-smoker, who had a myocardial infarction at age 47 or 48.  Tanya's  father has hypertension.  Artemio's maternal grandmother has hypertension and his maternal grandfather has had bypass surgery at age 76.  Ash has two healthy sibs; his older brother will be his donor.       Current medications include:  Current Outpatient Prescriptions   Medication     TYLENOL OR     No current facility-administered medications for this visit.        Current known allergies include:  Allergies   Allergen Reactions     No Known Drug Allergies        Vital signs:  Vitals:    10/22/18 1408   BP: 120/72   BP Location: Right arm   Patient Position: Chair   Cuff Size: Adult Regular   Pulse: 61   Resp: 18   Temp: 98  F (36.7  C)   TempSrc: Oral   SpO2: 97%   Weight: 138 lb 10.7 oz (62.9 kg)   Height: 5' 8.9\" (175 cm)     Normalized stature-for-age data not available for patients older than 20 years.  Wt Readings from Last 3 Encounters:   10/22/18 138 lb 10.7 oz (62.9 kg)   10/22/18 138 lb 10.7 oz (62.9 kg)   09/07/06 59 lb 8 oz (27 kg) (57 %)*     * Growth percentiles are based on CDC 2-20 Years data.     Ht Readings from Last 2 Encounters:   10/22/18 5' 8.9\" (175 cm)   10/22/18 5' 8.9\" (175 cm)     Normalized BMI data available only for age 0 to 20 years.    Physical Examination:  On physical exam today Artemio was a cheerful young man in no distress.  Chest was clear to auscultation. Cardiac exam revealed normal first and second heart sounds.  The second heart sound was normal in intensity.  No murmur rub or gallop was heard.  Hepatic edge was at the costal margin.  Pulses were 2+ in right upper and right lower extremities.    EKG  The EKG today showed normal sinus rhythm with WPW pattern at a rate of 49 beats/minute.  VA interval was short at 88 " msec;  QRS duration prolonged at 114 msec and QTc interval was normal at 402 msec.  QRS axis was normal at +86 degrees.  There were no ST-T wave changes present.    Cardiac Echo   Normal echocardiogram. There is normal appearance and motion of the tricuspid, mitral, pulmonary and aortic valves. No atrial, ventricular or arterial level shunting. The calculated biplane left ventricular ejection fraction is 68 %. Normal right and left ventricular size and function.    In summary, Artemio has had no symptoms of palpitation, tachycardia of syncope (other than a single likely vagal syncope with getting a shot).  He has normal cardiac function on echo.  We are working to get the Holter from [facility] and have placed a ZioPatch on him today.  I will ask Dr. Anthony Garcias to review the tracings, which should be available by end of week, for any concerning rhythms.  If the current and previous monitors are unremarkable, it would be my recommendation to proceed with transplantation.   Artemio  does not require SBE prophylaxis for dental or contaminated procedures.  Artemio may be allowed activity to his own limits.   I did recommend follow-up with an Echo in 6 months.    Thank you for allowing me to participate in Artemio's care.  If you have any questions or concerns, please feel free to contact me.    Sincerely,    Ju Plummer MD, PhD  Professor of Pediatrics  [phone]    Cc: parents of Artemio

## 2018-10-22 NOTE — PATIENT INSTRUCTIONS
PEDS CARDIOLOGY  [facility] [address] [place] [address]  [address] [facility] [address] [phone]      Cardiology Clinic  [phone]  RN Care Coordinator, Maryan Addison (Bre)  [phone]  Pediatric Call Center/Scheduling  [phone]    After Hours and Emergency Contact Number  [phone]  * Ask for the pediatric cardiologist on call         Prescription Renewals  The pharmacy must fax requests to [phone]  * Please allow 3-4 days for prescriptions to be authorized

## 2018-10-23 ENCOUNTER — HOSPITAL ENCOUNTER (OUTPATIENT)
Dept: NUCLEAR MEDICINE | Facility: CLINIC | Age: 18
Setting detail: NUCLEAR MEDICINE
Discharge: HOME | End: 2018-10-23
Attending: PEDIATRICS | Admitting: PEDIATRICS
Payer: COMMERCIAL

## 2018-10-23 ENCOUNTER — ALLIED HEALTH/NURSE VISIT (OUTPATIENT)
Dept: TRANSPLANT | Facility: CLINIC | Age: 18
End: 2018-10-23
Attending: PEDIATRICS
Payer: COMMERCIAL

## 2018-10-23 DIAGNOSIS — Z86.2 PERSONAL HISTORY OF DISEASES OF BLOOD AND BLOOD-FORMING ORGANS: ICD-10-CM

## 2018-10-23 DIAGNOSIS — C91.01 ACUTE LYMPHOBLASTIC LEUKEMIA (ALL) IN REMISSION (H): Primary | ICD-10-CM

## 2018-10-23 DIAGNOSIS — Z76.82 BONE MARROW TRANSPLANT CANDIDATE: ICD-10-CM

## 2018-10-23 DIAGNOSIS — C91.00 PRE B-CELL ACUTE LYMPHOBLASTIC LEUKEMIA (ALL) (H): Primary | ICD-10-CM

## 2018-10-23 LAB
CMV IGG SERPL QL IA: <0.2 AI (ref 0–0.8)
EBV VCA IGG SER QL IA: <0.2 AI (ref 0–0.8)
FLUAV H1 2009 PAND RNA SPEC QL NAA+PROBE: NEGATIVE
FLUAV H1 RNA SPEC QL NAA+PROBE: NEGATIVE
FLUAV H3 RNA SPEC QL NAA+PROBE: NEGATIVE
FLUAV RNA SPEC QL NAA+PROBE: NEGATIVE
FLUBV RNA SPEC QL NAA+PROBE: NEGATIVE
HADV DNA SPEC QL NAA+PROBE: NEGATIVE
HADV DNA SPEC QL NAA+PROBE: NEGATIVE
HBV CORE AB SERPL QL IA: NONREACTIVE
HBV SURFACE AG SERPL QL IA: NONREACTIVE
HCV AB SERPL QL IA: NONREACTIVE
HIV 1+2 AB+HIV1 P24 AG SERPL QL IA: NONREACTIVE
HMPV RNA SPEC QL NAA+PROBE: NEGATIVE
HPIV1 RNA SPEC QL NAA+PROBE: NEGATIVE
HPIV2 RNA SPEC QL NAA+PROBE: NEGATIVE
HPIV3 RNA SPEC QL NAA+PROBE: NEGATIVE
HSV1 IGG SERPL QL IA: <0.2 AI (ref 0–0.8)
HSV2 IGG SERPL QL IA: <0.2 AI (ref 0–0.8)
HTLV I+II AB PATRN SER IB-IMP: NEGATIVE
MICROBIOLOGIST REVIEW: NORMAL
RHINOVIRUS RNA SPEC QL NAA+PROBE: NEGATIVE
RSV RNA SPEC QL NAA+PROBE: NEGATIVE
RSV RNA SPEC QL NAA+PROBE: NEGATIVE
SPECIMEN SOURCE: NORMAL
T PALLIDUM AB SER QL: NONREACTIVE

## 2018-10-23 PROCEDURE — 34300033 ZZH RX 343: Performed by: PEDIATRICS

## 2018-10-23 PROCEDURE — 94726 PLETHYSMOGRAPHY LUNG VOLUMES: CPT | Mod: ZF

## 2018-10-23 PROCEDURE — 94375 RESPIRATORY FLOW VOLUME LOOP: CPT | Mod: ZF

## 2018-10-23 PROCEDURE — A9539 TC99M PENTETATE: HCPCS | Performed by: PEDIATRICS

## 2018-10-23 PROCEDURE — 94150 VITAL CAPACITY TEST: CPT | Mod: ZF

## 2018-10-23 PROCEDURE — 40000268 ZZH STATISTIC NO CHARGES: Mod: ZF

## 2018-10-23 PROCEDURE — 78725 KIDNEY FUNCTION STUDY: CPT

## 2018-10-23 PROCEDURE — 25000128 H RX IP 250 OP 636: Performed by: PEDIATRICS

## 2018-10-23 PROCEDURE — 94729 DIFFUSING CAPACITY: CPT | Mod: ZF

## 2018-10-23 RX ORDER — HEPARIN SODIUM (PORCINE) LOCK FLUSH IV SOLN 100 UNIT/ML 100 UNIT/ML
5 SOLUTION INTRAVENOUS ONCE
Status: COMPLETED | OUTPATIENT
Start: 2018-10-23 | End: 2018-10-23

## 2018-10-23 RX ORDER — HEPARIN SODIUM,PORCINE 10 UNIT/ML
5 VIAL (ML) INTRAVENOUS ONCE
Status: COMPLETED | OUTPATIENT
Start: 2018-10-23 | End: 2018-10-23

## 2018-10-23 RX ADMIN — KIT FOR THE PREPARATION OF TECHNETIUM TC 99M PENTETATE 1.3 MILLICURIE: 20 INJECTION, POWDER, LYOPHILIZED, FOR SOLUTION INTRAVENOUS; RESPIRATORY (INHALATION) at 08:07

## 2018-10-23 RX ADMIN — SODIUM CHLORIDE, PRESERVATIVE FREE 5 ML: 5 INJECTION INTRAVENOUS at 12:06

## 2018-10-23 RX ADMIN — SODIUM CHLORIDE, PRESERVATIVE FREE 5 ML: 5 INJECTION INTRAVENOUS at 08:08

## 2018-10-23 NOTE — PROGRESS NOTES
BMT Teaching Flowsheet    Artemio is a [age] year old male with high-risk ALL who failed induction referred by Dr. Manjinder Paniagua at [facility] being treated per protocol PP3982-27 with a matched transplant from sibling, [facility].    Calendar reviewed yesterday, discussing dates, times, and locations of workup appointments including the rationale behind each test/procedure/consultation, clarifying family would be contacted with any changes and to disregard instructions to contact the clinic each morning of workup    Teaching completed accordingly: met with Artemio (patient), Mervat (patient's mother) and Dangelo (patient's father) to discuss pre- and post-transplant processes, sample calendar, protocol and optional study consents, medications, hospital admission and discharge criteria, supportive care, available resources, and any other pertinent questions/concerns referencing the binder, handouts, and business cards for relevant providers; anticipatory guidance, clarification, and additional information provided according to learner's needs and requests    Person(s) involved in teaching: Mervat Ulloa and Dangelo  Motivation Level:  - Asks Questions: Yes  - Eager to Learn: Yes  - Cooperative: Yes  - Receptive (willing/able to accept information): Yes    Patient demonstrates understanding of the following:  - conditioning regimen: TBI and day +3 and +4 cytoxan  - transplant: bone marrow harvest and infusion  - supportive medications: antiemetics, narcotics, diuretics, antibacterial/antifungal/antiviral regimen, immunosuppression (tacrolimus and MMF)  - supportive care: ADL's (i.e., daily baths, oral care, activity up out of bed), therapies (i.e., PT, OT, speech, integrative, child family life), transfusions, nutrition (i.e., TPN if needed), infection prevention precautions, social work   - outpatient versus inpatient care considerations   - rationale for work-up appointments/tests related to diagnosis and  treatment plan  - which situations necessitate calling provider and whom to contact, highlighting clinic telephone number and hours, how to page BMT fellow on-call afterhours, nurse coordinator and primary BMT physician contact information  - important anniversary or BAN dates (i.e.., day +60, +100, +180 followed by annual appointments)  - post-transplant and -hospital discharge information including caregiver requirements, N95 mask/stroller cover, return to school/work requirements, labs monitored, transitioning care to referring care team    Teaching concerns addressed:  - reviewed remainder of workup calendar, explaining again, all unfamiliar tests and dates/times/locations of tests/procedures/consultations  -iInstructed not to check with  daily for schedule changes; rather, they'd be contacted by complex , , or nurse coordinator  - reduce infection exposure pre-transplant to minimize risk of unforseen delays    Patient instructed on hand hygiene: Yes    Instructional Materials Used/Given: verbal instruction, copy of work-up and sample calendars, medication handouts, screening and protocol consents, map of campus, and binder contents    Specific Concerns: Yes, discussed tacrolimus taper beginning at or around day +60 if not 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 Salmon and referring oncologist    Encouraged family to call with any additional questions/concerns or afterthoughts, reviewing who to contact for what, referring to providers' business cards, highlighting best way to reach designated nurse coordinator, physician, financial , , and     Plan to continue with workup as delineated by personalized calendar, anticipating exit conference on Wednesday, October 24th with Dr. Sid Girard followed by admission on Sunday, October 28th (line placement is scheduled for Friday, October 26th)

## 2018-10-23 NOTE — PROGRESS NOTES
I met with patient, his mom, dad, and brother. Focus was on providing education about the [facility], inpatient hospital unit, staff roles, role of BMT caregiver, hospital routines/expectations.  We toured the inpatient unit will I provided this education.

## 2018-10-23 NOTE — MR AVS SNAPSHOT
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, 9th floor  [address] [place] [address]  Phone: [phone]  Clinic Hours:   Monday-Friday:   7 am to 5:00 pm   closed weekends and major  holidays     If your fever is 100.5  or greater,   call the clinic during business hours.   After hours call [phone] and ask for the pediatric BMT physician to be paged for you.               Follow-ups after your visit        Your next 10 appointments already scheduled     Oct 23, 2018  1:00 PM CDT   FULL BATTERY PFT VISIT with [facility] PFT LAB   Peds Pulmonary Function Lab ([facility])    [address], [address] [place], [place] [address] [address] [address]   [phone]            Oct 23, 2018  2:00 PM CDT   SOCIAL WORK with [facility] PEDS BMT    Peds Blood and Marrow Transplant ([facility])    [facility]  9th Floor  [address] [place] [address]   [phone]            Oct 24, 2018 10:00 AM CDT   Pharm D Consult with [facility] Pharm D, [facility]   Peds Blood and Marrow Transplant ([facility])    [facility]  9th Floor  [address] [place] [address]   [phone]            Oct 24, 2018 11:00 AM CDT   [facility] B[facility] Peds Work Up with Sid Girard MD   Peds Blood and Marrow Transplant ([facility])    [facility]  9th Floor  [address] [place] [id number] [phone]            Oct 25, 2018  8:00 AM CDT   CONSULT with  Ester Early MD   [facility] Radiation Therapy ([facility])    [address] [id number] [phone]            Oct 26, 2018  8:30 AM CDT   (Arrive by 7:30 AM)   IR CVC TUNNEL PLACEMENT > 5 YRS OF AGE with URIR1, UR IR RAD   [facility] [place],  Interventional Radiology ([facility])    [address] [place] [address] [phone]           The day before the exam:   You may eat your regular diet.   You are encouraged to drink at least 8 eight ounce glasses of clear liquids.  Please wear loose clothing, such as a sweat suit or jogging clothes. Avoid snaps, zippers and other metal. We may ask you to undress and put on a hospital gown.  Please bring any scans or X-rays taken at other hospitals, if similar tests were done. Also bring a list of your medicines, including vitamins, minerals and over-the-counter drugs. It is safest to leave personal items at home.  Someone will need to drive you to and from the hospital.  Tell your doctor in advance:   If you have allergies to x-ray contrast or iodine.   If you are or may be pregnant.   If you are taking Coumadin (or any other blood thinners) 5 days prior to the exam for any special instructions.   If you are diabetic to determine if your insulin needs have to be adjusted for the exam.  Your doctor will:   Need to do a history and physical within 30 days before this procedure.   Obtain necessary laboratory tests prior to the exam (Basic Metabolic Panel, CBCP, PTT and INR)   (No labs needed if you are having a tunneled catheter exchange or removal)  If you were given sedation, you cannot drive for 24 hours after the procedure, and an adult must be with you until then.  If you have any questions, please call the Imaging Department where you will have your exam. Directions, parking instructions, and other information are available on our website,  [email or web address].            Oct 26, 2018   Procedure with Rex Valente MD   [facility] Sedation Observation ([facility])    [address]  [facility] [place] [address] [phone]           The [facility] is located in the [facility] area of [place]. lt is easily accessible from virtually any point in the [facility] area, via [address]              Who to contact     Please call your clinic at [phone] to:    Ask questions about your health    Make or cancel appointments    Discuss your medicines    Learn about your test results    Speak to your doctor            Additional Information About Your Visit        MyChart Information     Ensahart is an electronic gateway that provides easy, online access to your medical records. With Posiqt, you can request a clinic appointment, read your test results, renew a prescription or communicate with your care team.     To sign up for Posiqt visit the website at [email or web address]   You will be asked to enter the access code listed below, as well as some personal information. Please follow the directions to create your username and password.     Your access code is: [email or web address]  Expires: 2019 10:57 AM     Your access code will  in 90 days. If you need help or a new code, please contact your [facility] or call [phone] for assistance.        Care EveryWhere ID     This is your Care EveryWhere ID. This could be used by other organizations to access your [facility] medical records  [id number]         Blood Pressure from Last 3 Encounters:   10/22/18 120/72   10/22/18 120/72   04/10/06 98/54    Weight from Last 3 Encounters:   10/22/18 62.9 kg (138 lb 10.7 oz)   10/22/18 62.9 kg (138 lb 10.7 oz)   06 27 kg (59 lb 8 oz) (57 %)*     * Growth percentiles are based on CDC 2-20 Years data.              Today, you had the following     No orders found for display       Primary  Care Provider Office Phone # Fax #    Viky Zavala [phone] [address] [facility] [address]        Equal Access to Services     CHARY MEJIAS : Hadkhurram errol palacios rashidao Somaraali, waaxda luqadaha, qaybta kaalmada adeketan, jessy frias laLamarken madera. So [facility] [phone].    ATENCIÓN: Si habla español, tiene a london disposición servicios gratuitos de asistencia lingüística. Llame al [phone].    We comply with applicable federal civil rights laws and [place] laws. We do not discriminate on the basis of race, color, national origin, age, disability, sex, sexual orientation, or gender identity.            Thank you!     Thank you for choosing PEDS BLOOD AND MARROW TRANSPLANT  for your care. Our goal is always to provide you with excellent care. Hearing back from our patients is one way we can continue to improve our services. Please take a few minutes to complete the written survey that you may receive in the mail after your visit with us. Thank you!             Your Updated Medication List - Protect others around you: Learn how to safely use, store and throw away your medicines at www.disposemymeds.org.          This list is accurate as of 10/23/18 10:46 AM.  Always use your most recent med list.                   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

## 2018-10-24 ENCOUNTER — ALLIED HEALTH/NURSE VISIT (OUTPATIENT)
Dept: TRANSPLANT | Facility: CLINIC | Age: 18
End: 2018-10-24
Attending: PEDIATRICS
Payer: COMMERCIAL

## 2018-10-24 DIAGNOSIS — C91.00 PRE B-CELL ACUTE LYMPHOBLASTIC LEUKEMIA (ALL) (H): Primary | ICD-10-CM

## 2018-10-24 DIAGNOSIS — Z76.82 BONE MARROW TRANSPLANT CANDIDATE: Primary | ICD-10-CM

## 2018-10-24 DIAGNOSIS — C91.01 ACUTE LYMPHOBLASTIC LEUKEMIA (ALL) IN REMISSION (H): ICD-10-CM

## 2018-10-24 LAB
A* LOCUS NMDP: NORMAL
A* LOCUS: NORMAL
A* NMDP: NORMAL
A*: NORMAL
ABSSOP COMMENTS: NORMAL
ABTEST METHOD: NORMAL
B* LOCUS NMDP: NORMAL
B* LOCUS: NORMAL
B* NMDP: NORMAL
B*: NORMAL
BW-1: NORMAL
BW-2: NORMAL
C* LOCUS NMDP: NORMAL
C* LOCUS: NORMAL
C* NMDP: NORMAL
C*: NORMAL
DPA1* NMDP: NORMAL
DPA1*: NORMAL
DPB1* NMDP: NORMAL
DPB1*: NORMAL
DQA1*: NORMAL
DQA1*LOCUS NMDP: NORMAL
DQA1*LOCUS: NORMAL
DQB1* LOCUS NMDP: NORMAL
DQB1* LOCUS: NORMAL
DQB1* NMDP: NORMAL
DQB1*: NORMAL
DRB1* LOCUS NMDP: NORMAL
DRB1* LOCUS: NORMAL
DRB1* NMDP: NORMAL
DRB1*: NORMAL
DRB3* LOCUS NMDP: NORMAL
DRB3* LOCUS: NORMAL
DRB4*: NORMAL
DRSSO COMMENTS: NORMAL
DRSSO TEST METHOD: NORMAL
LAB SCANNED RESULT: NORMAL

## 2018-10-24 PROCEDURE — 40000809 ZZH STATISTIC NO DOCUMENTATION TO SUPPORT CHARGE

## 2018-10-24 NOTE — PROGRESS NOTES
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  He is undergoing workup for matched sibling donor hematopoietic stem cell transplant. He undergo myeloablative HCT  according to protocol 2015-29.     Allergies/Adverse Reactions to Medications/Food/Other Agents & Medication to Avoid:   NKDA    Current Medications Include:   The patient is currently taking the following medication:   1. Jakafi  2. Bactrim  The patient is currently taking the following medication as needed:   none    I instructed Artemio to continue all medications thru admission.      Patient Preference for Medications:   Artemio prefers that medication comes as pills      Herbal Medication/Nutritional Supplements:   I discussed the importance of avoiding the use of herbal products during the transplant and post transplant period while on immunosuppressants, and risk of potential drug/herb interactions.     Chemotherapy:   Artemio and his parents (family members present) and I reviewed the chemotherapy that Artemio will receive as part of his preparative regimen:   1. TBI  2. Post-transplant cytoxan    Other supportive medications that Artemio will also receive include:  1. GCSF to start Day+5 and continue until ANC is >1500 x 3 days  2. Hyperhydration and Mesna with Cytoxan    We discussed the common side effects of the chemotherapy and supportive medications.  We also briefly discussed the possible need for TPN as nutritional support if nausea, vomiting, and mucositis make it difficult for Artemio to eat.    Immunosuppression:   We discussed the immunosuppression agents that Artemio will receive as part of his GVHD prophylaxis:   1. Tacrolimus beginning Day +5 through Day +60. Goal tacrolimus levels thru Day +14 will be 10-15, then decrease goal to 5-10 ng/mL  2. MMF beginning Day +5 through Day  +35 or 7 days after neutrophil engraftment, whichever is later. AND No acute GVHD present.    We discussed the common side effects of these medications. We discussed the importance of drug levels with these medications and how we get these drug levels.     Nausea/Vomiting issues:   Artemio has tolerated chemotherapy well and has required minimal amounts of anti emetics. We discussed what Artemio is willing to use continuous infusion of ondansetron, but has successfully utilized kytril as well.  Also order rescue medications of lorazepam and diphenhydramine for breakthrough nausea and vomiting.      Pain issues:   We discussed our standard approach to pain management (e.g. Morphine drip).     Infection Prophylaxis:   Viral prophylaxis: HiDose ACY; CMV R: Negative, HSV R: Negative, CMV D: Positive  Fungal prophylaxis: This patient is high risk for fungal infection; recommend micafungin on admission, then transition to voriconazole on Day +5 (after cytoxan)  PCP prophylaxis: Bactrim  Bacterial prophylaxis: Standard   MRSA: Low risk; does not need IV Vanco w/ fevers    Other Information pertinent to transplant:   Kidney function: [facility] GFR study  = 98 ml/min (normal range is 89.4 to 164.6 ml/min).  Pulmonary function: Chest CT = no signs of infections, but has trace pneumomediastinum.   Cardiology function: EKG - QTc = 402, ECHO - EF = 68%. Both WNL. History of WPW. Review Cards consult for extra recommendations.    Summary:   I met with Artemio and his parents (family members present) today to complete the medication history, discuss medication preferences, review chemotherapy, immunosuppression, anti-infectives and other supportive medications Artemio will receive as part of transplant. We had a good discussion;  Artemio and his parents (family members present) asked appropriate questions and expressed understanding.     Recommendations:   1. Recommend stop Jakafi on admission. Plan to restart at Day +60.  Jakafi is a CY substrate and will interact with azoles. Will need to transition to micafungin with Jakafi or Jakafi will require a dose reduction.   2. Assign GWN videos on admission.  3. Artemio had inotuzumab with previous rounds of chemotherapy.  This puts him at high risk for VOD. Primary MD will submit letter of medical necessity for defibrotide prophylaxis with his HSCT.  Pharmacist to follow up with pharmacy management team.   4. Pt has WPW. Review Cards consult for extra recommendations.  5. Chest CT has trace pneumomediastinum.     It was a pleasure to be involved in Artemio s care.  Pharmacy will continue to follow.    Francisca Culp, AndreD

## 2018-10-24 NOTE — NURSING NOTE
Chief Complaint   Patient presents with     RECHECK     Patient is here today for ALL follow up     There were no vitals taken for this visit. Exit conference only    Donna Andrews LPN  October 24, 2018

## 2018-10-24 NOTE — MR AVS SNAPSHOT
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   After hours call [phone] and ask for the pediatric BMT physician to be paged for you.               Follow-ups after your visit        Your next 10 appointments already scheduled     Oct 25, 2018  8:00 AM CDT   CONSULT with Ester Early MD   [facility] Radiation Therapy ([facility])    [address] [address] [phone]            Oct 25, 2018 10:00 AM CDT   SOCIAL WORK with [facility] PEDS BMT    Peds Blood and Marrow Transplant ([facility])    [facility]  [address]th [address] [address] [phone]            Oct 26, 2018  8:30 AM CDT   (Arrive by 7:30 AM)   IR CVC TUNNEL PLACEMENT > 5 YRS OF AGE with URIR1, UR IR RAD   [facility] [place],  Interventional Radiology ([facility])    [address] [place] [address] [phone]           The day before the exam:   You may eat your regular diet.   You are encouraged to drink at least 8 eight ounce glasses of clear liquids.  Please wear loose clothing, such as a sweat suit or jogging clothes. Avoid snaps, zippers and other metal. We may ask you to undress and put on a hospital gown.  Please bring any scans or  X-rays taken at other hospitals, if similar tests were done. Also bring a list of your medicines, including vitamins, minerals and over-the-counter drugs. It is safest to leave personal items at home.  Someone will need to drive you to and from the hospital.  Tell your doctor in advance:   If you have allergies to x-ray contrast or iodine.   If you are or may be pregnant.   If you are taking Coumadin (or any other blood thinners) 5 days prior to the exam for any special instructions.   If you are diabetic to determine if your insulin needs have to be adjusted for the exam.  Your doctor will:   Need to do a history and physical within 30 days before this procedure.   Obtain necessary laboratory tests prior to the exam (Basic Metabolic Panel, CBCP, PTT and INR)   (No labs needed if you are having a tunneled catheter exchange or removal)  If you were given sedation, you cannot drive for 24 hours after the procedure, and an adult must be with you until then.  If you have any questions, please call the Imaging Department where you will have your exam. Directions, parking instructions, and other information are available on our website, [email or web address].            Oct 26, 2018   Procedure with Rex Valente MD   [facility] Sedation Observation ([facility])    [address] [facility] [address] [phone]           The [facility] is located in the [facility]. lt is easily accessible from virtually any point in the [facility] area, via [address]              Who to contact     Please call your clinic at [phone] to:    Ask questions about your health    Make or cancel appointments    Discuss your medicines    Learn about your test results    Speak to your doctor            Additional Information About Your Visit        PayRangehart Information     Geofeedia is an electronic gateway that provides easy, online access to your medical records. With Geofeedia, you  can request a clinic appointment, read your test results, renew a prescription or communicate with your care team.     To sign up for Crushpath visit the website at [email or web address]   You will be asked to enter the access code listed below, as well as some personal information. Please follow the directions to create your username and password.     Your access code is: [email or web address]  Expires: 2019 10:57 AM     Your access code will  in 90 days. If you need help or a new code, please contact your [facility] or call [phone] for assistance.        Care EveryWhere ID     This is your Care EveryWhere ID. This could be used by other organizations to access your [place] medical records  [id number]         Blood Pressure from Last 3 Encounters:   10/22/18 120/72   10/22/18 120/72   04/10/06 98/54    Weight from Last 3 Encounters:   10/22/18 62.9 kg (138 lb 10.7 oz)   10/22/18 62.9 kg (138 lb 10.7 oz)   06 27 kg (59 lb 8 oz) (57 %)*     * Growth percentiles are based on [facility] 2-20 Years data.              Today, you had the following     No orders found for display       Primary Care Provider Office Phone # Fax #    Viky Zavala [phone] [address] [facility] [address]        Equal Access to Services     CHARY MEJIAS AH: Hadii errol ku hadasho Somaraali, waaxda luqadaha, qaybta kaalmada adeegyada, jessy palm . So [facility] [phone].    ATENCIÓN: Si habla español, tiene a london disposición servicios gratuitos de asistencia lingüística. Llame al [phone].    We comply with applicable federal civil rights laws and [place] laws. We do not discriminate on the basis of race, color, national origin, age, disability, sex, sexual orientation, or gender identity.            Thank you!     Thank you for choosing PEDS BLOOD AND MARROW TRANSPLANT  for your care. Our goal is always to provide you with  excellent care. Hearing back from our patients is one way we can continue to improve our services. Please take a few minutes to complete the written survey that you may receive in the mail after your visit with us. Thank you!             Your Updated Medication List - Protect others around you: Learn how to safely use, store and throw away your medicines at www.disposemymeds.org.          This list is accurate as of 10/24/18  8:27 PM.  Always use your most recent med list.                   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

## 2018-10-24 NOTE — MR AVS SNAPSHOT
After Visit Summary   10/24/2018    Artemio Nino    MRN: [id number]           Patient Information     Date Of Birth          2000        Visit Information        Provider Department      10/24/2018 10:00 AM D, [facility] Peds Bmt Pharm, [facility] P[facility] Blood and Marrow Transplant        Today's Diagnoses     Bone marrow transplant candidate    -  1    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)              [facility], 9th floor  [address] [place] [address]  Phone: [phone]  Clinic Hours:   Monday-Friday:   7 am to 5:00 pm   closed weekends and major  holidays     If your fever is 100.5  or greater,   call the clinic during business hours.   After hours call [phone] and ask for the pediatric BMT physician to be paged for you.              Care Instructions    No follow up instructions as of 10/25/2018 at 9:48am SLL          Follow-ups after your visit        Your next 10 appointments already scheduled     Oct 29, 2018  8:00 AM CDT   EXTERNAL RADIATION TREATMENT with [facility]MP PEDS RAD ONC ELEKTA   [facility] ([facility])    [address] [address]   [phone]            Oct 29, 2018  2:00 PM CDT   EXTERNAL RADIATION TREATMENT with UMP PEDS RAD ONC ELEKTA   [facility] ([facility])    [address] [address]   [phone]            Oct 30, 2018  8:00 AM CDT   EXTERNAL RADIATION TREATMENT with UMP PEDS RAD ONC ELEKTA   [facility] ([facility])    [address] [address]   [phone]            Oct 30, 2018  2:00 PM CDT   EXTERNAL RADIATION TREATMENT with UMP PEDS RAD ONC ELEKTA   [facility] ([facility])    [address] [address] [phone]            Oct 31, 2018  8:00 AM CDT    EXTERNAL RADIATION TREATMENT with UMP PEDS RAD ONC ELEKTA   [facility] ([facility])    [address] [address]   [phone]            Oct 31, 2018  2:00 PM CDT   EXTERNAL RADIATION TREATMENT with UMP PEDS RAD ONC ELEKTA   [facility] ([facility])    [address] [address]   [phone]            2018  8:00 AM CDT   EXTERNAL RADIATION TREATMENT with UMP PEDS RAD ONC ELEKTA   [facility] ([facility])    [address] [address]   [phone]            2018  2:00 PM CDT   EXTERNAL RADIATION TREATMENT with UMP PEDS RAD ONC ELEKTA   [facility] ([facility])    [address] [address] [phone]              Who to contact     Please call your clinic at [phone] to:    Ask questions about your health    Make or cancel appointments    Discuss your medicines    Learn about your test results    Speak to your doctor            Additional Information About Your Visit        Sion Powerhart Information     Solar Junctiont is an electronic gateway that provides easy, online access to your medical records. With TripOvation, you can request a clinic appointment, read your test results, renew a prescription or communicate with your care team.     To sign up for Solar Junctiont visit the website at [email or web address]   You will be asked to enter the access code listed below, as well as some personal information. Please follow the directions to create your username and password.     Your access code is: [email or web address]  Expires: 2019 10:57 AM     Your access code will  in 90 days. If you need help or a new code, please contact your [facility] or call [phone] for assistance.        Care EveryWhere ID     This is your Care  EveryWhere ID. This could be used by other organizations to access your [place] medical records  [id number]         Blood Pressure from Last 3 Encounters:   10/28/18 108/68   10/26/18 91/59   10/22/18 120/72    Weight from Last 3 Encounters:   10/28/18 65.1 kg (143 lb 8.3 oz)   10/26/18 62.8 kg (138 lb 7.2 oz)   10/22/18 62.9 kg (138 lb 10.7 oz)              Today, you had the following     No orders found for display       Primary Care Provider Office Phone # Fax #    Viky Zavala [phone] [address] [facility] [address]        Equal Access to Services     CHARY MEJIAS : Hadii aad ku hadasho Soshady, waaxda luqadaha, qaybta kaalmada adeegyada, jessy madera. So [facility] [phone].    ATENCIÓN: Si habla español, tiene a london disposición servicios gratuitos de asistencia lingüística. Rita al [phone].    We comply with applicable federal civil rights laws and [place] laws. We do not discriminate on the basis of race, color, national origin, age, disability, sex, sexual orientation, or gender identity.            Thank you!     [facility]  for your care. Our goal is always to provide you with excellent care. Hearing back from our patients is one way we can continue to improve our services. Please take a few minutes to complete the written survey that you may receive in the mail after your visit with us. Thank you!             Your Updated Medication List - Protect others around you: Learn how to safely use, store and throw away your medicines at www.disposemymeds.org.          This list is accurate as of 10/24/18 11:59 PM.  Always use your most recent med list.                   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

## 2018-10-25 ENCOUNTER — ALLIED HEALTH/NURSE VISIT (OUTPATIENT)
Dept: TRANSPLANT | Facility: CLINIC | Age: 18
End: 2018-10-25
Attending: PEDIATRICS
Payer: COMMERCIAL

## 2018-10-25 ENCOUNTER — CARE COORDINATION (OUTPATIENT)
Dept: PEDIATRICS | Facility: CLINIC | Age: 18
End: 2018-10-25

## 2018-10-25 ENCOUNTER — CARE COORDINATION (OUTPATIENT)
Dept: TRANSPLANT | Facility: CLINIC | Age: 18
End: 2018-10-25

## 2018-10-25 ENCOUNTER — OFFICE VISIT (OUTPATIENT)
Dept: RADIATION ONCOLOGY | Facility: CLINIC | Age: 18
End: 2018-10-25
Attending: RADIOLOGY
Payer: COMMERCIAL

## 2018-10-25 DIAGNOSIS — C91.01 ACUTE LYMPHOBLASTIC LEUKEMIA (ALL) IN REMISSION (H): Primary | ICD-10-CM

## 2018-10-25 DIAGNOSIS — Z71.9 ENCOUNTER FOR COUNSELING: Primary | ICD-10-CM

## 2018-10-25 LAB
MPX SERIES: NONREACTIVE
T CRUZI AB SER DONR QL: NONREACTIVE
WNV RNA SPEC QL NAA+PROBE: NONREACTIVE

## 2018-10-25 PROCEDURE — 77300 RADIATION THERAPY DOSE PLAN: CPT | Performed by: RADIOLOGY

## 2018-10-25 PROCEDURE — 77321 SPECIAL TELETX PORT PLAN: CPT | Performed by: RADIOLOGY

## 2018-10-25 PROCEDURE — 77334 RADIATION TREATMENT AID(S): CPT | Performed by: RADIOLOGY

## 2018-10-25 PROCEDURE — 77290 THER RAD SIMULAJ FIELD CPLX: CPT | Performed by: NURSE PRACTITIONER

## 2018-10-25 PROCEDURE — 77470 SPECIAL RADIATION TREATMENT: CPT | Performed by: RADIOLOGY

## 2018-10-25 NOTE — PROGRESS NOTES
Radiation Oncology Consult  Patient comes in for initial consultation in the Radiation Oncology Department at the request of Dr. Zavala to determine eligibility for TBI prior to transplant.     History of Present Illness:  Artemio is a pleasant 17 year old male in no acute distress and is accompanied by his parents.  In February of this year, Artemio had joint pain and then a syncopal episode.  His hemoglobin was 10.7, platelet 82,000 and white count was 8,000 with 5% peripheral blasts. 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  He started chemotherapy per protocol  AALL 1131 2/15/18.  On day 8 his MRD was 5.2% blasts.  On 3/15/18 he had 31% blasts on bone marrow biopsy.  He then started on Inotuzumab.  Bone marrow biopsy on 6/28/18 showed just mildly increased blasts at 1.8%.  He then had high dose methotrexate then with the addition of ruxolitinib (JAKAFI). On 9/7 flow shoed 0.014% MRD.  He then had another round of induction with cytoxan, etoposide and Ruxolitinib.  Bone marrow biopsy on 10/15/18 was negative but flow showed MRD at 0.006%.  He states he tolerated all treatment well.  He was CNS negative throughout.     His brother will be his donor with tentative admission date of 10/28.        Previous Radiation:  None    Previous Chemotherapy:  As above per HPI.    Implantable Cardiac Device (ICD):  NONE    Medications:  Current Outpatient Prescriptions   Medication     ruxolitinib (JAKAFI) 25 MG TABS tablet CHEMO     sulfamethoxazole-trimethoprim (BACTRIM DS/SEPTRA DS) 800-160 MG per tablet     No current facility-administered medications for this visit.        Allergies:     Allergies   Allergen Reactions     No Known Drug Allergies        Past Medical History:  No past medical history on file.    Past Surgical History:  No past surgical history on file.    Family History:  family history is not on file.        Social  History:  Patient is single and lives in [place], [place].  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    Pain Assessment 0-10:  Patient rates pain at a 0.    Review of Symptoms:  Constitutional: Negative for fever, chills, weight loss and malaise/fatigue.   Overall patient feels well.  HENT: Negative for nosebleeds, congestion, sore throat and neck pain.   Respiratory: Negative for cough, hemoptysis, sputum production, shortness of breath and wheezing.   Cardiovascular: Negative for chest pain, palpitations, claudication, leg swelling and PND.   Gastrointestinal: Negative for heartburn, nausea, vomiting, abdominal pain, diarrhea, constipation and blood in stool.   Genitourinary: Negative for dysuria.   Musculoskeletal: Negative for myalgias and joint pain.   Skin: Negative for itching and rash.   Neurological: Negative for dizziness, tingling, weakness and headaches.   Endo/Heme/Allergies: Does not bruise/bleed easily.   Psychiatric/Behavioral: Negative for depression and suicidal ideas. The patient is not nervous/anxious.     Physical Exam:  GENERAL: Well-developed, well-nourished, globally oriented.   HEENT: Normocephalic, atraumatic. Oral cavity and oropharynx without mucosal lesions.   NECK: Supple, full range of motion, no palpable cervical or supraclavicular lymphadenopathy.   LUNGS: Clear to auscultation bilaterally.   CARDIOVASCULAR: Regular rate and rhythm without murmur.   ABDOMEN: Soft, nondistended, nontender, no hepatosplenomegaly.  EXTREMITIES: Without cyanosis, clubbing or edema. .   LYMPHATICS: No palpable supraclavicular, axillary, inguinal, femoral adenopathy.   NEUROLOGIC: Alert and oriented.  Gait normal.     Labs:  CBC RESULTS:   Recent Labs   Lab Test  10/22/18   1048   WBC  5.3   RBC  3.31*   HGB  9.5*   HCT  28.4*   MCV  86   MCH  28.7   MCHC  33.5   RDW  13.7   PLT  214       Pregnancy status:  Male    All pertinent labs, scans, and test results have been  reviewed.    EDUCATION:  Patient given verbal and written education on TBI side effects, purpose of treatment and what the radiation experience will be like.  Patient expressed understanding and asked appropriate questions.        Assessment/Plan: ALL  Patient currently undergoing work-up for sibling donor MA transplant per protocol 2015-29 which calls for TBI 1320 cGy plus 400 cGy testicular boost.    STAFF RADIATION ONCOLOGY    Patient  appears to be a suitable candidate for TBI prior to hematopoietic transplant per protocol .  Patient has no radiation history.     Conditioning for this protocol is myeloablative and includes chemotherapy and total body irradiation.   I would recommend our regimen of TBI which is  given in 8 treatments over 4 days at 165 cGy per fraction with at least 6 hours between fractions at a dose rate of 10-15 cGy/minute.    The total body will be treated with the  patient in a semi-recumbent position withcompensators.     Risks and benefits of TBI were discussed including the potential short term side effects but not limited to nausea, vomiting, mucositis, alopecia, and potential organ damage.  Also discussed potential long term side effects including cataracts, sterility, chronic organ dysfunction, neurological effects, hormone insufficiencies, and secondary malignancies.    Patient and family were given a chance to ask questions.  They seem to understand risks and benefits of radiation conditioning prior to transplant.  Informed consent was obtained.      Simulation and measurements were performed under my direction      Thank you for this referral.  If you have any questions or concerns please do not hesitate to contact me. Patient will be followed by BMT staff after transplant.    Ester Early MD    Department of Radiation Oncology  [facility]      CC  Patient Care Team:  Viky Zavala MD as PCP - General (Pediatric  Hematology-Oncology)  [place], [facility] as  ( - Clinical)  HERBERT LOPEZ

## 2018-10-25 NOTE — MR AVS SNAPSHOT
After Visit Summary   10/25/2018    Artemio Nino    MRN: [id number]           Patient Information     Date Of Birth          2000        Visit Information        Provider Department      10/25/2018 10:00 AM U[facility] PEDS BMT  Peds Blood and Marrow Transplant        Today's Diagnoses     Encounter for counseling    -  1          [facility], 9th floor  [address] [place] [address]  Phone: [phone]  Clinic Hours:   Monday-Friday:   7 am to 5:00 pm   closed weekends and major  holidays     If your fever is 100.5  or greater,   call the clinic during business hours.   After hours call [phone] and ask for the pediatric BMT physician to be paged for you.              Care Instructions    No follow up instructions as of 10/26/218 at 8:40am SLL          Follow-ups after your visit        Who to contact     Please call your clinic at [phone] to:    Ask questions about your health    Make or cancel appointments    Discuss your medicines    Learn about your test results    Speak to your doctor            Additional Information About Your Visit        MyChart Information     Imitix is an electronic gateway that provides easy, online access to your medical records. With Imitix, you can request a clinic appointment, read your test results, renew a prescription or communicate with your care team.     To sign up for Imitix visit the website at [email or web address]   You will be asked to enter the access code listed below, as well as some personal information. Please follow the directions to create your username and password.     Your access code is: [email or web address]  Expires: 2019  9:57 AM     Your access code will  in 90 days. If you need help or a new code, please contact your [facility] or call [phone] for assistance.        Care EveryWhere ID     This is your Care  EveryWhere ID. This could be used by other organizations to access your [place] medical records  [id number]         Blood Pressure from Last 3 Encounters:   11/06/18 117/81   10/26/18 91/59   10/22/18 120/72    Weight from Last 3 Encounters:   11/05/18 62.6 kg (138 lb 0.1 oz)   10/26/18 62.8 kg (138 lb 7.2 oz)   10/22/18 62.9 kg (138 lb 10.7 oz)              Today, you had the following     No orders found for display       Primary Care Provider Office Phone # Fax #    Viky Zavala [phone] [address] [facility] [address]        Equal Access to Services     CHARY MEJIAS : Hadii errol fieldo Soshady, waaxda luqadaha, qaybta kaalmada adeegyada, jessy madera. So [facility] [phone].    ATENCIÓN: Si habla español, tiene a london disposición servicios gratuitos de asistencia lingüística. Rita al [phone].    We comply with applicable federal civil rights laws and [place] laws. We do not discriminate on the basis of race, color, national origin, age, disability, sex, sexual orientation, or gender identity.            Thank you!     Thank you for choosing [facility]  for your care. Our goal is always to provide you with excellent care. Hearing back from our patients is one way we can continue to improve our services. Please take a few minutes to complete the written survey that you may receive in the mail after your visit with us. Thank you!             Your Updated Medication List - Protect others around you: Learn how to safely use, store and throw away your medicines at www.disposemymeds.org.          This list is accurate as of 10/25/18 11:59 PM.  Always use your most recent med list.                   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

## 2018-10-25 NOTE — MR AVS SNAPSHOT
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)    -  1       Follow-ups after your visit        Your next 10 appointments already scheduled     Oct 30, 2018  8:00 AM CDT   EXTERNAL RADIATION TREATMENT with UMP PEDS RAD ONC ELEKTA   [facility] ([facility])    [address] [address] [phone]            Oct 30, 2018  2:00 PM CDT   EXTERNAL RADIATION TREATMENT with UMP PEDS RAD ONC ELEKTA   [facility] ([facility])    [address] [address] [phone]            Oct 31, 2018  8:00 AM CDT   EXTERNAL RADIATION TREATMENT with UMP PEDS RAD ONC ELEKTA   [facility] ([facility])    [address] [address] [phone]            Oct 31, 2018  2:00 PM CDT   EXTERNAL RADIATION TREATMENT with UMP PEDS RAD ONC ELEKTA   [facility] ([facility])    [address] [address] [phone]            Nov 01, 2018  8:00 AM CDT   EXTERNAL RADIATION TREATMENT with UMP PEDS RAD ONC ELEKTA   [facility] ([facility])    [address] [address] [phone]            Nov 01, 2018  2:00 PM CDT   EXTERNAL RADIATION TREATMENT with UMP PEDS RAD ONC ELEKTA   [facility] ([facility])    [address] [address] [phone]              Who to contact     Please call your clinic at [phone] to:    Ask questions about your health    Make or cancel  appointments    Discuss your medicines    Learn about your test results    Speak to your doctor            Additional Information About Your Visit        MyChart Information     DAD Technology Limitedhart is an electronic gateway that provides easy, online access to your medical records. With FAMOCOt, you can request a clinic appointment, read your test results, renew a prescription or communicate with your care team.     To sign up for FAMOCOt visit the website at [email or web address]   You will be asked to enter the access code listed below, as well as some personal information. Please follow the directions to create your username and password.     Your access code is: [email or web address]  Expires: 2019 10:57 AM     Your access code will  in 90 days. If you need help or a new code, please contact your [facility] or call [phone] for assistance.        Care EveryWhere ID     This is your Care EveryWhere ID. This could be used by other organizations to access your [place] medical records  [id number]         Blood Pressure from Last 3 Encounters:   10/29/18 (!) 85/45   10/26/18 91/59   10/22/18 120/72    Weight from Last 3 Encounters:   10/29/18 64 kg (141 lb 1.5 oz)   10/26/18 62.8 kg (138 lb 7.2 oz)   10/22/18 62.9 kg (138 lb 10.7 oz)              Today, you had the following     No orders found for display       Primary Care Provider Office Phone # Fax #    Viky Zavala [phone]       [facility] [address]        Equal Access to Services     [facility]: Hadii aad ku hadasho Soomaali, waaxda luqadaha, qaybta kaalmada adeegyada, jessy byrnes hayken palm . So [facility] [phone].    ATENCIÓN: Si habla español, tiene a london disposición servicios gratuitos de asistencia lingüística. Llame al [phone].    We comply with applicable federal civil rights laws and [place] laws. We do not discriminate on the basis of race,  color, national origin, age, disability, sex, sexual orientation, or gender identity.            Thank you!     Thank you for choosing [facility] RADIATION THERAPY  for your care. Our goal is always to provide you with excellent care. Hearing back from our patients is one way we can continue to improve our services. Please take a few minutes to complete the written survey that you may receive in the mail after your visit with us. Thank you!             Your Updated Medication List - Protect others around you: Learn how to safely use, store and throw away your medicines at www.disposemymeds.org.          This list is accurate as of 10/25/18 11:59 PM.  Always use your most recent med list.                   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

## 2018-10-25 NOTE — PROGRESS NOTES
Exit Note:    I met with Artemio Nino and his parents today to discuss the results of work up and impending HSCT.    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  HSCT is indicated because of failure of induction chemotherapy to induce remission.  Alternative therapies include continuing chemotherapy or supportive therapy, which have not shown success with curing the disease.    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 Found to have trace pneumomediastinum on CT chest- is asymptomatic so will continue with conservative management, if develops symptoms like acute chest pain or shortness of breath will need another CT chest to follow up. Liver, kidney and lung function within normal limits. Infectious diseases markers which are resulted are within acceptable range.    We discussed the risks of allogeneic HSCT.  These include the risks of organ failure, GvHD, graft failure, hemorrhage, and infection (fungus, bacteria or virus).  We also discussed the risks of the preparative regimen- total body irradiation and adverse effects of immunosuppressive agents used for GvH prophylaxis- cyclophosphamide, tacrolimus and MMF.  Overall, we discussed the risk of transplant-related mortality, which is estimated at about 5-10% with a matched sibling donor transplant.    Long-term risks include chronic GvHD, second malignancies, endocrinopathies and infertility.      Following our discussion, I believe the family understands the indication for, rationale for, and risks of HSCT for pre-B ALL.      They provided their informed consent for transplant on clinical protocol OR2483-04 and [facility] protocol. All relevant consent documents were signed.      For allo-HSCT, conditioning will be with total body irradiation. The allograft source will be  matched sibling bone marrow stem cells.  GvHD prophylaxis will be with cyclophosphamide, tacrolimus and mycophenolate mofetil    60 minutes spent face-to-face with the patient and family.  45 minutes spent reviewing results  45 minutes spent confirming eligibility for relevant studies and formulating/implementing the transplant plan.    Sid Girard MD    Pediatric Blood and Marrow Transplant   [facility]  Pager: [phone]

## 2018-10-26 ENCOUNTER — TRANSFERRED RECORDS (OUTPATIENT)
Dept: TRANSPLANT | Facility: CLINIC | Age: 18
End: 2018-10-26

## 2018-10-26 ENCOUNTER — ANESTHESIA EVENT (OUTPATIENT)
Dept: PEDIATRICS | Facility: CLINIC | Age: 18
End: 2018-10-26
Payer: COMMERCIAL

## 2018-10-26 ENCOUNTER — HOSPITAL ENCOUNTER (OUTPATIENT)
Dept: INTERVENTIONAL RADIOLOGY/VASCULAR | Facility: CLINIC | Age: 18
End: 2018-10-26
Attending: PEDIATRICS
Payer: COMMERCIAL

## 2018-10-26 ENCOUNTER — MEDICAL CORRESPONDENCE (OUTPATIENT)
Dept: TRANSPLANT | Facility: CLINIC | Age: 18
End: 2018-10-26

## 2018-10-26 ENCOUNTER — HOSPITAL ENCOUNTER (OUTPATIENT)
Facility: CLINIC | Age: 18
Discharge: HOME | End: 2018-10-26
Attending: RADIOLOGY | Admitting: RADIOLOGY
Payer: COMMERCIAL

## 2018-10-26 ENCOUNTER — ANESTHESIA (OUTPATIENT)
Dept: PEDIATRICS | Facility: CLINIC | Age: 18
End: 2018-10-26
Payer: COMMERCIAL

## 2018-10-26 ENCOUNTER — CARE COORDINATION (OUTPATIENT)
Dept: TRANSPLANT | Facility: CLINIC | Age: 18
End: 2018-10-26

## 2018-10-26 ENCOUNTER — ALLIED HEALTH/NURSE VISIT (OUTPATIENT)
Dept: CARE COORDINATION | Facility: CLINIC | Age: 18
End: 2018-10-26

## 2018-10-26 VITALS
SYSTOLIC BLOOD PRESSURE: 91 MMHG | OXYGEN SATURATION: 98 % | DIASTOLIC BLOOD PRESSURE: 59 MMHG | TEMPERATURE: 97.5 F | BODY MASS INDEX: 20.51 KG/M2 | RESPIRATION RATE: 11 BRPM | WEIGHT: 138.45 LBS

## 2018-10-26 DIAGNOSIS — Z86.2 PERSONAL HISTORY OF DISEASES OF BLOOD AND BLOOD-FORMING ORGANS: ICD-10-CM

## 2018-10-26 DIAGNOSIS — C91.01 ACUTE LYMPHOBLASTIC LEUKEMIA (ALL) IN REMISSION (H): Primary | ICD-10-CM

## 2018-10-26 DIAGNOSIS — Z71.9 ENCOUNTER FOR COUNSELING: Primary | ICD-10-CM

## 2018-10-26 LAB — COPATH REPORT: NORMAL

## 2018-10-26 PROCEDURE — 27210905 ZZH KIT CR7

## 2018-10-26 PROCEDURE — 25000125 ZZHC RX 250: Performed by: RADIOLOGY

## 2018-10-26 PROCEDURE — 40000165 ZZH STATISTIC POST-PROCEDURE RECOVERY CARE: Performed by: RADIOLOGY

## 2018-10-26 PROCEDURE — 25000128 H RX IP 250 OP 636: Performed by: RADIOLOGY

## 2018-10-26 PROCEDURE — 40001011 ZZH STATISTIC PRE-PROCEDURE NURSING ASSESSMENT: Performed by: RADIOLOGY

## 2018-10-26 PROCEDURE — 77001 FLUOROGUIDE FOR VEIN DEVICE: CPT

## 2018-10-26 PROCEDURE — 25000128 H RX IP 250 OP 636

## 2018-10-26 PROCEDURE — C1751 CATH, INF, PER/CENT/MIDLINE: HCPCS

## 2018-10-26 PROCEDURE — 27210902 ZZH KIT CR4

## 2018-10-26 PROCEDURE — 25000128 H RX IP 250 OP 636: Performed by: NURSE ANESTHETIST, CERTIFIED REGISTERED

## 2018-10-26 PROCEDURE — 37000009 ZZH ANESTHESIA TECHNICAL FEE, EACH ADDTL 15 MIN: Performed by: RADIOLOGY

## 2018-10-26 PROCEDURE — 37000008 ZZH ANESTHESIA TECHNICAL FEE, 1ST 30 MIN: Performed by: RADIOLOGY

## 2018-10-26 PROCEDURE — 25000128 H RX IP 250 OP 636: Performed by: PHYSICIAN ASSISTANT

## 2018-10-26 PROCEDURE — C1769 GUIDE WIRE: HCPCS

## 2018-10-26 RX ORDER — LIDOCAINE 40 MG/G
CREAM TOPICAL
Status: DISCONTINUED | OUTPATIENT
Start: 2018-10-26 | End: 2018-10-26 | Stop reason: HOSPADM

## 2018-10-26 RX ORDER — HEPARIN SODIUM (PORCINE) LOCK FLUSH IV SOLN 100 UNIT/ML 100 UNIT/ML
5 SOLUTION INTRAVENOUS ONCE
Status: COMPLETED | OUTPATIENT
Start: 2018-10-26 | End: 2018-10-26

## 2018-10-26 RX ORDER — FENTANYL CITRATE 50 UG/ML
INJECTION, SOLUTION INTRAMUSCULAR; INTRAVENOUS PRN
Status: DISCONTINUED | OUTPATIENT
Start: 2018-10-26 | End: 2018-10-26

## 2018-10-26 RX ORDER — SODIUM CHLORIDE, SODIUM LACTATE, POTASSIUM CHLORIDE, CALCIUM CHLORIDE 600; 310; 30; 20 MG/100ML; MG/100ML; MG/100ML; MG/100ML
INJECTION, SOLUTION INTRAVENOUS CONTINUOUS PRN
Status: DISCONTINUED | OUTPATIENT
Start: 2018-10-26 | End: 2018-10-26

## 2018-10-26 RX ORDER — PROPOFOL 10 MG/ML
INJECTION, EMULSION INTRAVENOUS CONTINUOUS PRN
Status: DISCONTINUED | OUTPATIENT
Start: 2018-10-26 | End: 2018-10-26

## 2018-10-26 RX ORDER — HEPARIN SODIUM (PORCINE) LOCK FLUSH IV SOLN 100 UNIT/ML 100 UNIT/ML
SOLUTION INTRAVENOUS
Status: COMPLETED
Start: 2018-10-26 | End: 2018-10-26

## 2018-10-26 RX ORDER — HEPARIN SODIUM,PORCINE 10 UNIT/ML
5 VIAL (ML) INTRAVENOUS ONCE
Status: DISCONTINUED | OUTPATIENT
Start: 2018-10-26 | End: 2018-10-26 | Stop reason: HOSPADM

## 2018-10-26 RX ORDER — HEPARIN SODIUM,PORCINE 10 UNIT/ML
5-10 VIAL (ML) INTRAVENOUS
Status: DISCONTINUED | OUTPATIENT
Start: 2018-10-26 | End: 2018-10-27 | Stop reason: HOSPADM

## 2018-10-26 RX ORDER — PROPOFOL 10 MG/ML
INJECTION, EMULSION INTRAVENOUS PRN
Status: DISCONTINUED | OUTPATIENT
Start: 2018-10-26 | End: 2018-10-26

## 2018-10-26 RX ORDER — HEPARIN SODIUM,PORCINE 10 UNIT/ML
5 VIAL (ML) INTRAVENOUS EVERY 24 HOURS
Status: DISCONTINUED | OUTPATIENT
Start: 2018-10-26 | End: 2018-10-27 | Stop reason: HOSPADM

## 2018-10-26 RX ORDER — ONDANSETRON 2 MG/ML
INJECTION INTRAMUSCULAR; INTRAVENOUS PRN
Status: DISCONTINUED | OUTPATIENT
Start: 2018-10-26 | End: 2018-10-26

## 2018-10-26 RX ADMIN — ONDANSETRON 4 MG: 2 INJECTION INTRAMUSCULAR; INTRAVENOUS at 08:44

## 2018-10-26 RX ADMIN — PROPOFOL 100 MG: 10 INJECTION, EMULSION INTRAVENOUS at 08:27

## 2018-10-26 RX ADMIN — CEFAZOLIN SODIUM 1500 MG: 10 INJECTION, POWDER, FOR SOLUTION INTRAVENOUS at 08:31

## 2018-10-26 RX ADMIN — SODIUM CHLORIDE, PRESERVATIVE FREE 5 ML: 5 INJECTION INTRAVENOUS at 08:55

## 2018-10-26 RX ADMIN — SODIUM CHLORIDE, PRESERVATIVE FREE 500 UNITS: 5 INJECTION INTRAVENOUS at 10:15

## 2018-10-26 RX ADMIN — LIDOCAINE HYDROCHLORIDE 6 ML: 10 INJECTION, SOLUTION EPIDURAL; INFILTRATION; INTRACAUDAL; PERINEURAL at 08:35

## 2018-10-26 RX ADMIN — PROPOFOL 275 MCG/KG/MIN: 10 INJECTION, EMULSION INTRAVENOUS at 08:26

## 2018-10-26 RX ADMIN — FENTANYL CITRATE 25 MCG: 50 INJECTION, SOLUTION INTRAMUSCULAR; INTRAVENOUS at 08:38

## 2018-10-26 RX ADMIN — SODIUM CHLORIDE, POTASSIUM CHLORIDE, SODIUM LACTATE AND CALCIUM CHLORIDE: 600; 310; 30; 20 INJECTION, SOLUTION INTRAVENOUS at 08:22

## 2018-10-26 ASSESSMENT — ENCOUNTER SYMPTOMS: DYSRHYTHMIAS: 1

## 2018-10-26 NOTE — OR NURSING
Pt alert, VSS,  No c/o discomfort ,  CVL site dry and intact , port hep locked and de-accessed. Pt tolerating fluids well. Discharge instructions reviewed with pt and parents. Pt discharged home with parents via w/c to car.

## 2018-10-26 NOTE — IP AVS SNAPSHOT
MRN:[id number]                      After Visit Summary   10/26/2018    Artemio Nino    MRN: [id number]           Thank you!     Thank you for choosing [facility] for your care. Our goal is always to provide you with excellent care. Hearing back from our patients is one way we can continue to improve our services. Please take a few minutes to complete the written survey that you may receive in the mail after you visit with us. Thank you!        Patient Information     Date Of Birth          2000        About your hospital stay     You were admitted on:  October 26, 2018 You last received care in the:  [facility]    You were discharged on:  October 26, 2018       Who to Call     For medical emergencies, please call 911.  For non-urgent questions about your medical care, please call your primary care provider or clinic, [phone]  For questions related to your surgery, please call your surgery clinic        Attending Provider     Provider Specialty    Rex Valente MD Radiology       Primary Care Provider Office Phone # Fax #    Viky Zavala [phone]      Further instructions from your care team       [facility]  Pediatric Interventional Radiology  Discharge Instructions for Tunneled Central Venous Catheter Placement    Date of Procedure: 10/26/2018      Today you had a Tunneled Central Venous Catheter Placed by Rex Valente MD      Activity    No strenuous activity for 1 week    No heavy lifting (greater than 10 pounds) for 3 days    No contact sports for 2 weeks    No swimming, tub bath, or hot tub while Tunneled Central Venous Catheter is in place    Diet    Resume your regular diet    Discomfort    Pain medications as directed    Site Care    Your site(s) has been closed with Dermabond (upper neck site), and dressed with sterile dressing (catheter insertion site).  Keep the catheter insertion site  clean, dry, and covered.  Only change the dressing if it becomes wet or dirty.       If there is any oozing or bleeding from the site, apply direct pressure for 5-10 minutes with a gauze pad.  If bleeding continues after 10 minutes, call Pediatric Interventional Radiology.  If bleeding cannot be controlled with direct pressure, call 911.      Cover insertion site dressing with a folded dry washcloth, cover with plastic wrap, and secure by Microfoam tape.  After your shower, remove the covering.  Leave the insertion site dressing intact.    If sedation was given:    DO NOT drive or operate heavy machinery for 24 hours    DO NOT drink alcoholic beverages for 24 hours    DO NOT make important legal decisions for 24 hours     You must have a responsible adult to drive you home and stay with you for 24 hours    Call your Doctor if:    Bleeding    Swelling in your neck or arm    Fever greater than 100.5 degrees F (oral)    Other signs of infection such as redness, tenderness, or drainage from the wound    If you have any questions or concerns about this procedure:  Pediatric Interventional Radiology [phone] Mon-Fri, 7am to 5pm    [phone] After-hours, weekends, holidays  Ask for the Pediatric Interventional Radiologist on-call       Home Instructions after Sedation  Today you received (medicine):  Propofol, Fentanyl and Zofran  Please keep this form with your health records  You may be more sleepy today than normal. Also, an adult should stay with you for the rest of the day. The medicine may make you  dizzy. Avoid activities that require balance (bike riding, skating, climbing stairs, walking).  Remember:    When you want to eat again, start with liquids (juice, soda pop, Popsicles). If you feel well enough, you may try a regular diet. It is best to start with light meals for the first 24 hours.    If you are nauseated (feels sick to the stomach) or vomiting (throws up), give small amounts of clear liquids  "(7-Up, Sprite, apple juice or broth). Fluids are more important than food until you are feeling better.    Wait 24 hours before taking medicine that contains alcohol. This includes liquid cold, cough and allergy medicines (Robitussin, Vicks Formula 44 for children, Benadryl, Chlor-Trimeton).  Call your doctor if:    You have questions about the test results.    You vomit (throws up) more than two times..     If you have trouble breathing, call 911.  If you have any questions or concerns, please call:  Pediatric Sedation Unit [phone]  Pediatric clinic  [phone]  [facility]  [phone]  Emergency department [phone]  [facility] toll-free number [phone] (Monday--Friday, 8 a.m. to 4:30 p.m.)  I understand these instructions. I have all of my personal belongings.      Pending Results     Date and Time Order Name Status Description    10/26/2018 0724 IR CVC Tunnel Place > 5 Yrs Of Age Left In process             Admission Information     Date & Time Provider Department Dept. Phone    10/26/2018 Rex Valente MD [facility] Sedation Observation [phone]      Your Vitals Were     Blood Pressure Temperature Respirations Weight Pulse Oximetry BMI (Body Mass Index)    82/45 (Cuff Size: Adult Regular) 96.1  F (35.6  C) (Axillary) 13 62.8 kg (138 lb 7.2 oz) 100% 20.51 kg/m2      [employer] Information     [employer] lets you send messages to your doctor, view your test results, renew your prescriptions, schedule appointments and more. To sign up, go to [email or web address] . Click on \"Log in\" on the left side of the screen, which will take you to the Welcome page. Then click on \"Sign up Now\" on the right side of the page.     You will be asked to enter the access code listed below, as well as some personal information. Please follow the directions to create your username and password.     Your access code is: [email or web address]  Expires: 2019 10:57 AM     Your access code will  in 90 days. If " you need help or a new code, please call your [facility] clinic or [phone].        Care EveryWhere ID     This is your Care EveryWhere ID. This could be used by other organizations to access your [facility] medical records  [id number]        Equal Access to Services     MIKELANGLE MAGALIE : Hadii aad ku hadjaredstephen Somaraali, waaxda luqadaha, qaybta kaalmada adelee annda, waxpearl fitz angelazay burger guzmanalcides madera. So [facility] [phone].    ATENCIÓN: Si habla español, tiene a london disposición servicios gratuitos de asistencia lingüística. Llame al [phone].    We comply with applicable federal civil rights laws and [place] laws. We do not discriminate on the basis of race, color, national origin, age, disability, sex, sexual orientation, or gender identity.               Review of your medicines      UNREVIEWED medicines. 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             Medication List: This is a list of all your medications and when to take them. Check marks below indicate your daily home schedule. Keep this list as a reference.      Medications           Morning Afternoon Evening Bedtime As Needed    ruxolitinib 25 MG Tabs tablet CHEMO   Commonly known as:  JAKAFI   Current dose 65 mg twice daily                                sulfamethoxazole-trimethoprim 800-160 MG per tablet   Commonly known as:  BACTRIM DS/SEPTRA DS   Take 2  tablets by mouth Every Mon, Tues two times daily

## 2018-10-26 NOTE — IR NOTE
Interventional Radiology Intra-procedural Nursing Note    Patient Name: Artemio Nino  Medical Record Number: [id number]  Today's Date: October 26, 2018    Start Time: 0835  End of procedure time: 00902  Procedure: Double lumen Galvan placement  Report given to: RN PS  Time pt departs:  0904  :     Other Notes: Both lumen heparinized as ordered.     Demian Hernandez

## 2018-10-26 NOTE — DISCHARGE INSTRUCTIONS
[facility]  Pediatric Interventional Radiology  Discharge Instructions for Tunneled Central Venous Catheter Placement    Date of Procedure: 10/26/2018      Today you had a Tunneled Central Venous Catheter Placed by Rex Valente MD      Activity    No strenuous activity for 1 week    No heavy lifting (greater than 10 pounds) for 3 days    No contact sports for 2 weeks    No swimming, tub bath, or hot tub while Tunneled Central Venous Catheter is in place    Diet    Resume your regular diet    Discomfort    Pain medications as directed    Site Care    Your site(s) has been closed with Dermabond (upper neck site), and dressed with sterile dressing (catheter insertion site).  Keep the catheter insertion site clean, dry, and covered.  Only change the dressing if it becomes wet or dirty.       If there is any oozing or bleeding from the site, apply direct pressure for 5-10 minutes with a gauze pad.  If bleeding continues after 10 minutes, call Pediatric Interventional Radiology.  If bleeding cannot be controlled with direct pressure, call 911.      Cover insertion site dressing with a folded dry washcloth, cover with plastic wrap, and secure by Microfoam tape.  After your shower, remove the covering.  Leave the insertion site dressing intact.    If sedation was given:    DO NOT drive or operate heavy machinery for 24 hours    DO NOT drink alcoholic beverages for 24 hours    DO NOT make important legal decisions for 24 hours     You must have a responsible adult to drive you home and stay with you for 24 hours    Call your Doctor if:    Bleeding    Swelling in your neck or arm    Fever greater than 100.5 degrees F (oral)    Other signs of infection such as redness, tenderness, or drainage from the wound    If you have any questions or concerns about this procedure:  Pediatric Interventional Radiology [phone] Mon-Fri, 7am to 5pm    [phone] After-hours, weekends,  holidays  Ask for the Pediatric Interventional Radiologist on-call       Home Instructions after Sedation  Today you received (medicine):  Propofol, Fentanyl and Zofran  Please keep this form with your health records  You may be more sleepy today than normal. Also, an adult should stay with you for the rest of the day. The medicine may make you  dizzy. Avoid activities that require balance (bike riding, skating, climbing stairs, walking).  Remember:    When you want to eat again, start with liquids (juice, soda pop, Popsicles). If you feel well enough, you may try a regular diet. It is best to start with light meals for the first 24 hours.    If you are nauseated (feels sick to the stomach) or vomiting (throws up), give small amounts of clear liquids (7-Up, Sprite, apple juice or broth). Fluids are more important than food until you are feeling better.    Wait 24 hours before taking medicine that contains alcohol. This includes liquid cold, cough and allergy medicines (Robitussin, Vicks Formula 44 for children, Benadryl, Chlor-Trimeton).  Call your doctor if:    You have questions about the test results.    You vomit (throws up) more than two times..     If you have trouble breathing, call [phone].  If you have any questions or concerns, please call:  Pediatric Sedation Unit [phone]  Pediatric clinic  [phone]  [facility]  [phone]  Emergency department [phone]  [facility] toll-free number [phone] (Monday--Friday, 8 a.m. to 4:30 p.m.)  I understand these instructions. I have all of my personal belongings.

## 2018-10-26 NOTE — MR AVS SNAPSHOT
After Visit Summary   10/26/2018    Artemio Nino    MRN: [id number]           Patient Information     Date Of Birth          2000        Visit Information        Provider Department      10/26/2018 11:54 AM [place] LICSW [facility]        Today's Diagnoses     Encounter for counseling    -  1       Follow-ups after your visit        Your next 10 appointments already scheduled     Oct 29, 2018  8:00 AM CDT   EXTERNAL RADIATION TREATMENT with [facility] PEDS RAD ONC ELEKTA   [facility] ([facility])    [address] [address]   [phone]            Oct 29, 2018  2:00 PM CDT   EXTERNAL RADIATION TREATMENT with [facility] PEDS RAD ONC ELEKTA   [facility] ([facility])    [address] [address]   [phone]            Oct 30, 2018  8:00 AM CDT   EXTERNAL RADIATION TREATMENT with [facility] PEDS RAD ONC ELEKTA   [facility] ([facility])    [address] [address]   [phone]            Oct 30, 2018  2:00 PM CDT   EXTERNAL RADIATION TREATMENT with [facility] PEDS RAD ONC ELEKTA   [facility] ([facility])    [address] [address]   [phone]            Oct 31, 2018  8:00 AM CDT   EXTERNAL RADIATION TREATMENT with [facility] PEDS RAD ONC ELEKTA   [facility] ([facility])    [address] [address]   [phone]            Oct 31, 2018  2:00 PM CDT   EXTERNAL RADIATION TREATMENT with [facility] PEDS RAD ONC ELEKTA   [facility] ([facility])    [address] [address]   [phone]            Nov 01, 2018  8:00 AM CDT   EXTERNAL RADIATION TREATMENT with [facility] PEDS RAD ONC ELEKTA   [facility] ([facility])    [facility]  "[address] [address]   [phone]            Nov 01, 2018  2:00 PM CDT   EXTERNAL RADIATION TREATMENT with [facility] PEDS RAD ONC ELEKTA   [facility] ([facility])    [place] [address] [facility] [address]   [phone]              Future tests that were ordered for you today     Open Standing Orders        Priority Remaining Interval Expires Ordered    Dressing - Insertion Site Routine 23525/62652 PRN  10/26/2018    Glucose monitor nursing POCT Routine 77744/53588 PRN  10/26/2018    Oxygen: Nasal cannula Routine 73314/92938 PRN  10/26/2018            Who to contact     If you have questions or need follow up information about today's clinic visit or your schedule please contact [facility] directly at No information on file..  Normal or non-critical lab and imaging results will be communicated to you by H[employer]zinghart, letter or phone within 4 business days after the clinic has received the results. If you do not hear from us within 7 days, please contact the clinic through H[employer]zing[employer] or phone. If you have a critical or abnormal lab result, we will notify you by phone as soon as possible.  Submit refill requests through W[employer] or call your pharmacy and they will forward the refill request to us. Please allow 3 business days for your refill to be completed.          Additional Information About Your Visit        [employer] Information     [employer] lets you send messages to your doctor, view your test results, renew your prescriptions, schedule appointments and more. To sign up, go to [email or web address] . Click on \"Log in\" on the left side of the screen, which will take you to the Welcome page. Then click on \"Sign up Now\" on the right side of the page.     You will be asked to enter the access code listed below, as well as some personal information. Please follow the directions to create your username and password.     Your access code " is: [email or web address]  Expires: 2019 10:57 AM     Your access code will  in 90 days. If you need help or a new code, please call your [facility] clinic or [phone].        Care EveryWhere ID     This is your Care EveryWhere ID. This could be used by other organizations to access your [facility] medical records  [id number]         Blood Pressure from Last 3 Encounters:   10/26/18 91/59   10/22/18 120/72   10/22/18 120/72    Weight from Last 3 Encounters:   10/26/18 62.8 kg (138 lb 7.2 oz)   10/22/18 62.9 kg (138 lb 10.7 oz)   10/22/18 62.9 kg (138 lb 10.7 oz)              Today, you had the following     No orders found for display       Primary Care Provider Office Phone # Fax #    Viky Zavala [phone]       [facility] [address]        Equal Access to Services     [facility] Faith HospitalBELIA : Hadii aad ku hadasho Soomaali, waaxda luqadaha, qaybta kaalmada adeegyada, waxay jwin hayken palm . So [facility] [phone].    ATENCIÓN: Si habla español, tiene a london disposición servicios gratuitos de asistencia lingüística. Llame al [phone].    We comply with applicable federal civil rights laws and [place] laws. We do not discriminate on the basis of race, color, national origin, age, disability, sex, sexual orientation, or gender identity.            Thank you!     Thank you for choosing [facility]  for your care. Our goal is always to provide you with excellent care. Hearing back from our patients is one way we can continue to improve our services. Please take a few minutes to complete the written survey that you may receive in the mail after your visit with us. Thank you!             Your Updated Medication List - Protect others around you: Learn how to safely use, store and throw away your medicines at www.disposemymeds.org.          This list is accurate as of 10/26/18 12:00 PM.  Always use your most recent med list.                   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

## 2018-10-26 NOTE — ANESTHESIA PREPROCEDURE EVALUATION
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 Baseline bradycardia  Normal echo - EF 68%      Neuro Findings - negative ROS    Pulmonary Findings - negative ROS    HENT Findings - negative HENT ROS    Skin Findings - negative skin ROS      GI/Hepatic/Renal Findings - negative ROS    Endocrine/Metabolic Findings - negative ROS      Genetic/Syndrome Findings - negative genetics/syndromes ROS    Hematology/Oncology Findings   (+) cancer and blood dyscrasia  Comments: B cell-ALL            PHYSICAL EXAM:   Mental Status/Neuro: A/A/O   Airway: Mallampati: I  Mouth/Opening: Full  TM distance: > 6 cm  Neck ROM: Full   Respiratory: Auscultation: CTAB     Resp. Rate: Normal     Resp. Effort: Normal      CV: Rhythm: Regular  Rate: Age appropriate  Heart: Normal Sounds   Comments:      Dental: Normal                  Lab Results   Component Value Date    WBC 5.3 10/22/2018    HGB 9.5 (L) 10/22/2018    HCT 28.4 (L) 10/22/2018     10/22/2018     10/22/2018    POTASSIUM 3.9 10/22/2018    CHLORIDE 108 10/22/2018    CO2 26 10/22/2018    BUN 12 10/22/2018    CR 0.81 10/22/2018    GLC 89 10/22/2018    ELSA 8.9 10/22/2018    ALBUMIN 4.3 10/22/2018    PROTTOTAL 7.3 10/22/2018    ALT 29 10/22/2018    AST 37 10/22/2018  "   ALKPHOS 34 (L) 10/22/2018    BILITOTAL 0.5 10/22/2018    PTT 36 10/22/2018    INR 1.11 10/22/2018         Preop Vitals  BP Readings from Last 3 Encounters:   10/26/18 98/63   10/22/18 120/72   10/22/18 120/72    Pulse Readings from Last 3 Encounters:   10/22/18 61   10/22/18 61      Resp Readings from Last 3 Encounters:   10/26/18 16   10/22/18 18   10/22/18 18    SpO2 Readings from Last 3 Encounters:   10/26/18 97%   10/22/18 97%   10/22/18 97%      Temp Readings from Last 1 Encounters:   10/26/18 36.6  C (97.8  F) (Oral)    Ht Readings from Last 1 Encounters:   10/22/18 1.75 m (5' 8.9\")      Wt Readings from Last 1 Encounters:   10/26/18 62.8 kg (138 lb 7.2 oz)    Estimated body mass index is 20.51 kg/(m^2) as calculated from the following:    Height as of 10/22/18: 1.75 m (5' 8.9\").    Weight as of this encounter: 62.8 kg (138 lb 7.2 oz).     LDA:  Port A Cath Single Left Chest wall (Active)   Access Date 10/26/18 10/26/2018  7:54 AM   Access Attempts 1 10/26/2018  7:54 AM   Gauge/Length Power noncoring 90 degree bend;3/4 inch;20 gauge 10/26/2018  7:54 AM   Site Assessment WDL 10/26/2018  7:54 AM   Line status: Medial or Superior Lumen Blood return noted;Saline locked 10/26/2018  7:54 AM   Extravasation? No 10/26/2018  7:54 AM   Dressing Intervention Transparent;Chlorhexadine sponge 10/26/2018  7:54 AM   Number of days:       Assessment:   ASA SCORE: 3    NPO Status: > 6 hours since completed Solid Foods   Documentation: H&P complete; Preop Testing complete; Consents complete   Proceeding: Proceed without further delay  Tobacco Use:  NO Active use of Tobacco/UNKNOWN Tobacco use status     Plan:   Anes. Type:  General      Induction:  IV (Standard)   Airway: Native Airway   Access/Monitoring: PIV   Maintenance: IV; Propofol   Emergence: Post-Procedural Sedation   Logistics: Same Day Surgery     Postop Pain/Sedation Strategy:  Standard-Options: Opioids PRN     PONV Management:  Adult Risk Factors:, Non-Smoker, " Postop Opioids  Prevention: Propofol Infusion; Ondansetron     CONSENT: Direct conversation   Plan and risks discussed with: Patient   Blood Products: Consent Deferred (Minimal Blood Loss)               Marian Redman MD

## 2018-10-26 NOTE — IP AVS SNAPSHOT
[facility] Sedation Observation    [address]    [place] [address]    Phone:  [phone]                                       After Visit Summary   10/26/2018    Artemio Nino    MRN: [id number]           After Visit Summary Signature Page     I have received my discharge instructions, and my questions have been answered. I have discussed any challenges I see with this plan with the nurse or doctor.    ..........................................................................................................................................  Patient/Patient Representative Signature      ..........................................................................................................................................  Patient Representative Print Name and Relationship to Patient    ..................................................               ................................................  Date                                   Time    ..........................................................................................................................................  Reviewed by Signature/Title    ...................................................              ..............................................  Date                                               Time          22EPIC Rev 08/18

## 2018-10-26 NOTE — ANESTHESIA POSTPROCEDURE EVALUATION
Anesthesia POST Procedure Evaluation    Patient: Artemio Nino   MRN:     [id number] Gender:   male   Age:    18 year old :      2000        Preoperative Diagnosis: Personal history of diseases of blood and blood-forming organs   Procedure(s):  double lumen tunneled line placement   Postop Comments: No value filed.       Anesthesia Type:  General    Reportable Event: NO     PAIN: Uncomplicated   Sign Out status: Comfortable, Well controlled pain     PONV: No PONV   Sign Out status:  No Nausea or Vomiting     Neuro/Psych: Uneventful perioperative course   Sign Out Status: Preoperative baseline; Age appropriate mentation     Airway/Resp.: Uneventful perioperative course   Sign Out Status: Non labored breathing, age appropriate RR     CV: Uneventful perioperative course   Sign Out status: Appropriate BP and perfusion indices; Appropriate HR/Rhythm     Disposition:   Sign Out in:  Peds sedation  Disposition:  Home  Recovery Course: Uneventful  Follow-Up: Not required           Last Anesthesia Record Vitals:  CRNA VITALS  10/26/2018 0826 - 10/26/2018 0926      10/26/2018             Temp: 36.4  C (97.5  F)          Last PACU/Preop Vitals:  Vitals:    10/26/18 0900 10/26/18 0915 10/26/18 0930   BP: (!) 82/45 (!) 81/44 (!) 87/51   Resp: 13 14 13   Temp: 35.6  C (96.1  F)  35.6  C (96.1  F)   SpO2: 100% 98% 98%         Electronically Signed By: Marian Redman MD, 2018, 10:12 AM

## 2018-10-26 NOTE — PROCEDURES
Interventional Radiology Brief Post Procedure Note    Procedure: IR CVC TUNNEL PLACEMENT > 5 YRS OF AGE    Proceduralist: Kamaljit Gómez PA-C    Assistant: RT Laverne(R)     Time Out: Prior to the start of the procedure and with procedural staff participation, I verbally confirmed the patient s identity using two indicators, relevant allergies, that the procedure was appropriate and matched the consent or emergent situation, and that the correct equipment/implants were available. Immediately prior to starting the procedure I conducted the Time Out with the procedural staff and re-confirmed the patient s name, procedure, and site/side. (The Joint Commission universal protocol was followed.)  Yes    Sedation: Monitored Anesthesia Care (MAC) administered and documented by Anesthesia Care Provider    Findings: Completed image-guided placement of 9.5 Setswana, 24 cm double lumen tunneled central venous catheter via right IJ. Aspirates and flushes freely, heparin locked and ready for immediate use. Tip in high right atrium.    Estimated Blood Loss: Minimal    Fluoroscopy Time: 0.4 minute(s)    Specimens: None    Complications: 1. None     Condition: Stable    Plan: Follow up per primary team. Return for removal as indicated.    Comments: See dictated procedure note for full details.    Kamaljit Gómez PA-C  Interventional Radiology  [phone]

## 2018-10-27 LAB — INTERPRETATION ECG - MUSE: NORMAL

## 2018-10-28 ENCOUNTER — HOSPITAL ENCOUNTER (INPATIENT)
Facility: CLINIC | Age: 18
LOS: 30 days | Discharge: HOME | End: 2018-11-27
Attending: PEDIATRICS | Admitting: PEDIATRICS
Payer: COMMERCIAL

## 2018-10-28 DIAGNOSIS — Z91.89 AT RISK FOR INFECTION: ICD-10-CM

## 2018-10-28 DIAGNOSIS — R63.0 ANOREXIA: ICD-10-CM

## 2018-10-28 DIAGNOSIS — C91.01 ACUTE LYMPHOBLASTIC LEUKEMIA (ALL) IN REMISSION (H): Primary | ICD-10-CM

## 2018-10-28 DIAGNOSIS — Z94.81 S/P ALLOGENEIC BONE MARROW TRANSPLANT (H): ICD-10-CM

## 2018-10-28 DIAGNOSIS — R11.0 NAUSEA: ICD-10-CM

## 2018-10-28 DIAGNOSIS — G47.00 INSOMNIA, UNSPECIFIED TYPE: ICD-10-CM

## 2018-10-28 DIAGNOSIS — R52 GENERALIZED PAIN: ICD-10-CM

## 2018-10-28 DIAGNOSIS — Z91.89 AT RISK FOR GRAFT VERSUS HOST DISEASE: ICD-10-CM

## 2018-10-28 DIAGNOSIS — Z78.9 ON TOTAL PARENTERAL NUTRITION: ICD-10-CM

## 2018-10-28 DIAGNOSIS — Z76.82 BONE MARROW TRANSPLANT CANDIDATE: ICD-10-CM

## 2018-10-28 DIAGNOSIS — R21 RASH AND NONSPECIFIC SKIN ERUPTION: ICD-10-CM

## 2018-10-28 PROBLEM — C91.00 ALL (ACUTE LYMPHOBLASTIC LEUKEMIA OF INFANT) (H): Status: ACTIVE | Noted: 2018-10-28

## 2018-10-28 LAB
ABO + RH BLD: NORMAL
ABO + RH BLD: NORMAL
ALBUMIN SERPL-MCNC: 3.9 G/DL (ref 3.4–5)
ALP SERPL-CCNC: 33 U/L (ref 40–150)
ALT SERPL W P-5'-P-CCNC: 27 U/L (ref 0–70)
ANION GAP SERPL CALCULATED.3IONS-SCNC: 7 MMOL/L (ref 3–14)
AST SERPL W P-5'-P-CCNC: 24 U/L (ref 0–45)
BASOPHILS # BLD AUTO: 0 10E9/L (ref 0–0.2)
BASOPHILS NFR BLD AUTO: 0.2 %
BILIRUB SERPL-MCNC: 0.3 MG/DL (ref 0.2–1.3)
BLD GP AB SCN SERPL QL: NORMAL
BLOOD BANK CMNT PATIENT-IMP: NORMAL
BUN SERPL-MCNC: 8 MG/DL (ref 7–30)
CALCIUM SERPL-MCNC: 8.7 MG/DL (ref 8.5–10.1)
CHLORIDE SERPL-SCNC: 109 MMOL/L (ref 94–109)
CO2 SERPL-SCNC: 25 MMOL/L (ref 20–32)
CREAT SERPL-MCNC: 0.82 MG/DL (ref 0.66–1.25)
DIFFERENTIAL METHOD BLD: ABNORMAL
EOSINOPHIL # BLD AUTO: 0 10E9/L (ref 0–0.7)
EOSINOPHIL NFR BLD AUTO: 1 %
ERYTHROCYTE [DISTWIDTH] IN BLOOD BY AUTOMATED COUNT: 14.9 % (ref 10–15)
GFR SERPL CREATININE-BSD FRML MDRD: >90 ML/MIN/1.7M2
GLUCOSE SERPL-MCNC: 99 MG/DL (ref 70–99)
HCT VFR BLD AUTO: 26.7 % (ref 40–53)
HGB BLD-MCNC: 9 G/DL (ref 13.3–17.7)
IMM GRANULOCYTES # BLD: 0.1 10E9/L (ref 0–0.4)
IMM GRANULOCYTES NFR BLD: 1.7 %
LYMPHOCYTES # BLD AUTO: 0.2 10E9/L (ref 0.8–5.3)
LYMPHOCYTES NFR BLD AUTO: 3.8 %
MCH RBC QN AUTO: 29.3 PG (ref 26.5–33)
MCHC RBC AUTO-ENTMCNC: 33.7 G/DL (ref 31.5–36.5)
MCV RBC AUTO: 87 FL (ref 78–100)
MONOCYTES # BLD AUTO: 0.7 10E9/L (ref 0–1.3)
MONOCYTES NFR BLD AUTO: 16.9 %
NEUTROPHILS # BLD AUTO: 3.2 10E9/L (ref 1.6–8.3)
NEUTROPHILS NFR BLD AUTO: 76.4 %
NRBC # BLD AUTO: 0 10*3/UL
NRBC BLD AUTO-RTO: 1 /100
PLATELET # BLD AUTO: 163 10E9/L (ref 150–450)
POTASSIUM SERPL-SCNC: 4.2 MMOL/L (ref 3.4–5.3)
PROT SERPL-MCNC: 6.8 G/DL (ref 6.8–8.8)
RBC # BLD AUTO: 3.07 10E12/L (ref 4.4–5.9)
SODIUM SERPL-SCNC: 141 MMOL/L (ref 133–144)
SPECIMEN EXP DATE BLD: NORMAL
WBC # BLD AUTO: 4.2 10E9/L (ref 4–11)

## 2018-10-28 PROCEDURE — 80053 COMPREHEN METABOLIC PANEL: CPT | Performed by: NURSE PRACTITIONER

## 2018-10-28 PROCEDURE — 86900 BLOOD TYPING SEROLOGIC ABO: CPT | Performed by: NURSE PRACTITIONER

## 2018-10-28 PROCEDURE — 25000132 ZZH RX MED GY IP 250 OP 250 PS 637: Performed by: NURSE PRACTITIONER

## 2018-10-28 PROCEDURE — 25000128 H RX IP 250 OP 636: Performed by: NURSE PRACTITIONER

## 2018-10-28 PROCEDURE — 86901 BLOOD TYPING SEROLOGIC RH(D): CPT | Performed by: NURSE PRACTITIONER

## 2018-10-28 PROCEDURE — 25000128 H RX IP 250 OP 636: Performed by: PEDIATRICS

## 2018-10-28 PROCEDURE — 86850 RBC ANTIBODY SCREEN: CPT | Performed by: NURSE PRACTITIONER

## 2018-10-28 PROCEDURE — 85025 COMPLETE CBC W/AUTO DIFF WBC: CPT | Performed by: NURSE PRACTITIONER

## 2018-10-28 PROCEDURE — 20000002 ZZH R&B BMT INTERMEDIATE

## 2018-10-28 RX ORDER — URSODIOL 300 MG/1
300 CAPSULE ORAL 3 TIMES DAILY
Status: DISCONTINUED | OUTPATIENT
Start: 2018-10-28 | End: 2018-11-14

## 2018-10-28 RX ORDER — ALBUTEROL SULFATE 90 UG/1
2 AEROSOL, METERED RESPIRATORY (INHALATION)
Status: DISCONTINUED | OUTPATIENT
Start: 2018-10-28 | End: 2018-11-01

## 2018-10-28 RX ORDER — LEVOFLOXACIN 5 MG/ML
10 INJECTION, SOLUTION INTRAVENOUS EVERY 24 HOURS
Status: DISCONTINUED | OUTPATIENT
Start: 2018-11-01 | End: 2018-11-01

## 2018-10-28 RX ORDER — HYDRALAZINE HYDROCHLORIDE 20 MG/ML
0.2 INJECTION INTRAMUSCULAR; INTRAVENOUS EVERY 4 HOURS PRN
Status: DISCONTINUED | OUTPATIENT
Start: 2018-11-02 | End: 2018-11-02

## 2018-10-28 RX ORDER — LEVOFLOXACIN 5 MG/ML
10 INJECTION, SOLUTION INTRAVENOUS EVERY 24 HOURS
Status: DISCONTINUED | OUTPATIENT
Start: 2018-11-02 | End: 2018-10-28

## 2018-10-28 RX ORDER — HYDRALAZINE HYDROCHLORIDE 20 MG/ML
0.2 INJECTION INTRAMUSCULAR; INTRAVENOUS EVERY 4 HOURS PRN
Status: CANCELLED | OUTPATIENT
Start: 2018-11-02

## 2018-10-28 RX ORDER — GRANISETRON HYDROCHLORIDE 1 MG/ML
1 INJECTION INTRAVENOUS 2 TIMES DAILY
Status: DISCONTINUED | OUTPATIENT
Start: 2018-10-29 | End: 2018-10-28

## 2018-10-28 RX ORDER — MEPERIDINE HYDROCHLORIDE 25 MG/ML
25 INJECTION INTRAMUSCULAR; INTRAVENOUS; SUBCUTANEOUS EVERY 30 MIN PRN
Status: DISCONTINUED | OUTPATIENT
Start: 2018-10-28 | End: 2018-10-28 | Stop reason: CLARIF

## 2018-10-28 RX ORDER — SODIUM CHLORIDE 9 MG/ML
INJECTION, SOLUTION INTRAVENOUS CONTINUOUS PRN
Status: DISCONTINUED | OUTPATIENT
Start: 2018-10-28 | End: 2018-11-07

## 2018-10-28 RX ORDER — FUROSEMIDE 10 MG/ML
10 INJECTION INTRAMUSCULAR; INTRAVENOUS
Status: DISPENSED | OUTPATIENT
Start: 2018-11-05 | End: 2018-11-07

## 2018-10-28 RX ORDER — PANTOPRAZOLE SODIUM 40 MG/1
40 TABLET, DELAYED RELEASE ORAL DAILY
Status: DISCONTINUED | OUTPATIENT
Start: 2018-10-29 | End: 2018-11-10

## 2018-10-28 RX ORDER — GRANISETRON HYDROCHLORIDE 1 MG/ML
1 INJECTION INTRAVENOUS 2 TIMES DAILY
Status: DISCONTINUED | OUTPATIENT
Start: 2018-10-28 | End: 2018-11-21

## 2018-10-28 RX ORDER — ALBUTEROL SULFATE 0.83 MG/ML
2.5 SOLUTION RESPIRATORY (INHALATION)
Status: DISCONTINUED | OUTPATIENT
Start: 2018-10-28 | End: 2018-11-01

## 2018-10-28 RX ORDER — ONDANSETRON 2 MG/ML
0.15 INJECTION INTRAMUSCULAR; INTRAVENOUS ONCE
Status: CANCELLED
Start: 2018-10-29 | End: 2018-10-29

## 2018-10-28 RX ORDER — SULFAMETHOXAZOLE AND TRIMETHOPRIM 400; 80 MG/1; MG/1
2.5 TABLET ORAL
Status: DISCONTINUED | OUTPATIENT
Start: 2018-12-03 | End: 2018-11-27 | Stop reason: HOSPADM

## 2018-10-28 RX ORDER — FUROSEMIDE 10 MG/ML
20 INJECTION INTRAMUSCULAR; INTRAVENOUS EVERY 8 HOURS PRN
Status: ACTIVE | OUTPATIENT
Start: 2018-11-04 | End: 2018-11-07

## 2018-10-28 RX ORDER — ACETAMINOPHEN 325 MG/1
15 TABLET ORAL ONCE
Status: COMPLETED | OUTPATIENT
Start: 2018-11-02 | End: 2018-11-02

## 2018-10-28 RX ORDER — DIPHENHYDRAMINE HYDROCHLORIDE 50 MG/ML
50 INJECTION INTRAMUSCULAR; INTRAVENOUS
Status: DISCONTINUED | OUTPATIENT
Start: 2018-10-28 | End: 2018-11-07

## 2018-10-28 RX ORDER — ACETAMINOPHEN 325 MG/1
15 TABLET ORAL ONCE
Status: CANCELLED | OUTPATIENT
Start: 2018-11-02 | End: 2018-11-02

## 2018-10-28 RX ORDER — METHYLPREDNISOLONE SODIUM SUCCINATE 125 MG/2ML
125 INJECTION, POWDER, LYOPHILIZED, FOR SOLUTION INTRAMUSCULAR; INTRAVENOUS
Status: DISCONTINUED | OUTPATIENT
Start: 2018-10-28 | End: 2018-11-07

## 2018-10-28 RX ORDER — ONDANSETRON 2 MG/ML
8 INJECTION INTRAMUSCULAR; INTRAVENOUS ONCE
Status: CANCELLED
Start: 2018-10-29 | End: 2018-10-29

## 2018-10-28 RX ORDER — DIPHENHYDRAMINE HYDROCHLORIDE 50 MG/ML
50 INJECTION INTRAMUSCULAR; INTRAVENOUS ONCE
Status: COMPLETED | OUTPATIENT
Start: 2018-11-02 | End: 2018-11-02

## 2018-10-28 RX ORDER — LORAZEPAM 2 MG/ML
0.01 INJECTION INTRAMUSCULAR EVERY 6 HOURS PRN
Status: DISCONTINUED | OUTPATIENT
Start: 2018-10-28 | End: 2018-11-04

## 2018-10-28 RX ORDER — ACETAMINOPHEN 325 MG/1
10.3 TABLET ORAL EVERY 4 HOURS PRN
Status: DISCONTINUED | OUTPATIENT
Start: 2018-10-28 | End: 2018-11-27 | Stop reason: HOSPADM

## 2018-10-28 RX ORDER — ONDANSETRON 2 MG/ML
8 INJECTION INTRAMUSCULAR; INTRAVENOUS ONCE
Status: CANCELLED
Start: 2018-10-28 | End: 2018-10-28

## 2018-10-28 RX ADMIN — URSODIOL 300 MG: 300 CAPSULE ORAL at 20:28

## 2018-10-28 RX ADMIN — ACYCLOVIR SODIUM 700 MG: 50 INJECTION, SOLUTION INTRAVENOUS at 23:50

## 2018-10-28 RX ADMIN — GRANISETRON HYDROCHLORIDE 1 MG: 1 INJECTION INTRAVENOUS at 19:42

## 2018-10-28 RX ADMIN — URSODIOL 300 MG: 300 CAPSULE ORAL at 14:49

## 2018-10-28 ASSESSMENT — ACTIVITIES OF DAILY LIVING (ADL)
SWALLOWING: 0-->SWALLOWS FOODS/LIQUIDS WITHOUT DIFFICULTY
RETIRED_COMMUNICATION: 0-->UNDERSTANDS/COMMUNICATES WITHOUT DIFFICULTY
AMBULATION: 0-->INDEPENDENT
FALL_HISTORY_WITHIN_LAST_SIX_MONTHS: NO
RETIRED_EATING: 0-->INDEPENDENT
TOILETING: 0-->INDEPENDENT
TRANSFERRING: 0-->INDEPENDENT
COGNITION: 0 - NO COGNITION ISSUES REPORTED
DRESS: 0-->INDEPENDENT
BATHING: 0-->INDEPENDENT

## 2018-10-28 NOTE — IP AVS SNAPSHOT
[facility]    [address]    [facility] [facility] [place] [address]    Phone:  [phone]                                       After Visit Summary   10/28/2018    Artemio Nino    MRN: [id number]           After Visit Summary Signature Page     I have received my discharge instructions, and my questions have been answered. I have discussed any challenges I see with this plan with the nurse or doctor.    ..........................................................................................................................................  Patient/Patient Representative Signature      ..........................................................................................................................................  Patient Representative Print Name and Relationship to Patient    ..................................................               ................................................  Date                                   Time    ..........................................................................................................................................  Reviewed by Signature/Title    ...................................................              ..............................................  Date                                               Time          22EPIC Rev 08/18

## 2018-10-28 NOTE — IP AVS SNAPSHOT
MRN:[id number]                      After Visit Summary   10/28/2018    Artemio Nino    MRN: [id number]           Thank you!     Thank you for choosing [facility] for your care. Our goal is always to provide you with excellent care. Hearing back from our patients is one way we can continue to improve our services. Please take a few minutes to complete the written survey that you may receive in the mail after you visit with us. Thank you!        Patient Information     Date Of Birth          2000        Designated Caregiver       Most Recent Value    Caregiver    Will someone help with your care after discharge? yes    Name of designated caregiver sana    Phone number of caregiver [phone]    Caregiver address see snap shot      About your hospital stay     You were admitted on:  October 28, 2018 You last received care in the:  [facility] Pediatric BMT Unit    You were discharged on:  November 27, 2018       Who to Call     For medical emergencies, please call 911.  For non-urgent questions about your medical care, please call your primary care provider or clinic, [phone]  For questions related to your surgery, please call your surgery clinic        Attending Provider     Provider Specialty    Viky Zavala MD Pediatric Hematology-Oncology    [facility], Jojo Mcwilliams MD Pediatric Hematology-Oncology    [facility] Candido Wheeler MD Pediatrics       Primary Care Provider Office Phone # Fax #    Vkiy Zavala [phone]      Your next 10 appointments already scheduled     Nov 28, 2018  8:45 AM CST   [facility] Bmt Peds Return with CEE Tim Blood and Marrow Transplant ([facility])    [facility]  9th Floor  [address] [address] [phone]            Nov 30, 2018  8:45 AM CST   [facility] Bmt Peds Anniversary Visit with CEE Tim Blood and  Marrow Transplant ([facility])    [place] [address] [address]   [phone]            Dec 01, 2018  8:00 AM CST   [facility] Peds Infusion 180 with [facility] PEDS INFUSION CHAIR 6   Peds IV Infusion ([facility])    [address] [address]   [phone]            Dec 03, 2018 10:30 AM CST   [facility] Bmt Peds Return with Janee Olmos PA-C   Peds Blood and Marrow Transplant ([facility])    [address] [address]   [phone]            Dec 05, 2018  9:30 AM CST   [facility] Bmt Peds Return with Candido Han PA-C   Peds Blood and Marrow Transplant ([facility])    [address] [address]   [phone]            Dec 07, 2018 11:30 AM CST   [facility] Bmt Peds Anniversary Visit with Viky Zavala MD   Peds Blood and Marrow Transplant ([facility])    [address] [address] [phone]            Dec 14, 2018 10:00 AM CST   [facility] Bmt Peds Anniversary Visit with Viky Zavala MD   Peds Blood and Marrow Transplant ([facility])    [address] [address] [phone]              Additional Services     PHYSICAL THERAPY REFERRAL       If you have not heard from the scheduling office within 2 business days, please call [phone] for all locations, with the exception of [place], please call [phone] and [place], please call [phone].    Please be aware that coverage of these services is subject to the terms and limitations of your health insurance plan.  Call member services at your health plan with any benefit or coverage questions.                  Future tests that were ordered for you     CBC with  platelets differential       Last Lab Result: Hemoglobin (g/dL)       Date                     Value                 11/27/2018               7.9 (L)          ----------            Comprehensive metabolic panel           Magnesium           Phosphorus           Tacrolimus level                 Further instructions from your care team       BMT Pediatric Summary of Care    This note has data from a flowsheet    November 27, 2018 12:07 PM  Artemio Nino  MRN: [id number]    Discharge Date: 11/27/2018    BMT Primary Physician: Dr. Zavala   BMT Nurse Coordinator: Daria Jara     Discharge Diagnosis: S/P BMT    Discharge To: Home     Activity: Enteric with C.diff treatment completed treatment. DX on 11/12 with C.Diff  Infection, no longer symptomatic     Catheter Care: Galvan dressing scheduled to be change prior to discharge   Port-a-cath left sided and last access for heparin locking on 10/27     Vascular Access Device Protocol Per Policy  Supplies through Home Infusion (Please supply central line dressing kits for weekly dressing changes).  [facility]  Fax: [phone]  Ph: [phone]       IV Medications through home infusion: TPN     Nutrition: TPN/IL-see separate orders for formula    Blood Transfusions:  Transfuse if Hemoglobin < or equal 8 mg/dL  Red Blood Cell Order: 1 units, irradiated and leukoreduced   Transfuse if Platelet count < or equal 10  Platelet order: one adult dose, irradiated and leukoreduced  Transfusion Pre-meds:  No pre meds ordered on MAR, no documentation of pre-meds in his recent progress notes   History of significant antibody against RBC give type RBC    Outpatient Pharmacy:  G-CSF to be given in clinic: (dose) for ANC less than 1.0     Laboratory Tests:  At next clinic appointment (date: 11/28)  Hemogram (CBC) differential, platelet count  Magnesium  Comprehensive Metabolic Panel  Phosphorus  Tacrolimus     Support Services:  Physical Therapy Consult  (Evaluate and  "Treat)-Referral sent to BMT complex schedulers    Appointments:   BMT Clinic:  Arrive to clinic on 11/28 at 8:30 am for labs followed by appointment with Janee Olmos PA-C at 8:45am  Please hold your  morningTacrolimus dose on 11/28.   For a drug level at first appt 11/28    SNOW Rasmussen CNP      Pending Results     Date and Time Order Name Status Description    11/27/2018 0954 Bone marrow biopsy In process     11/27/2018 0001 Process and hold DNA In process     11/27/2018 0001 DNA Marker Post Bmt Engraftment Bone Marrow In process     11/27/2018 0001 FISH With Professional Interpretation In process     11/27/2018 0001 CHROMOSOME BONE MARROW With Professional Interpretation In process     11/27/2018 0001 Leukemia Lymphoma Evaluation In process     11/27/2018 0001 Bone marrow biopsy In process     11/23/2018 1427 Dna Marker Post Bmt Engraftment Blood In process     11/15/2018 0801 Partial thromboplastin time In process             Statement of Approval     Ordered          11/27/18 1333  I have reviewed and agree with all the recommendations and orders detailed in this document.  EFFECTIVE NOW     Approved and electronically signed by:  Lilia López APRN CNP             Admission Information     Date & Time Provider Department Dept. Phone    10/28/2018 Candido Joshua MD [facility] Pediatric BMT [facility] [phone]      Your Vitals Were     Blood Pressure Pulse Temperature Respirations Height Weight    88/67 67 97.4  F (36.3  C) (Axillary) 16 1.74 m (5' 8.5\") 60.9 kg (134 lb 4.2 oz)    Pulse Oximetry BMI (Body Mass Index)                100% 20.11 kg/m2          Suso Information     Suso lets you send messages to your doctor, view your test results, renew your prescriptions, schedule appointments and more. To sign up, go to [email or web address] . Click on \"Log in\" on the left side of the screen, which will take you to the Welcome page. Then click on \"Sign up " "Now\" on the right side of the page.     You will be asked to enter the access code listed below, as well as some personal information. Please follow the directions to create your username and password.     Your access code is: [email or web address]  Expires: 2019  9:57 AM     Your access code will  in 90 days. If you need help or a new code, please call your [facility] clinic or [phone].        Care EveryWhere ID     This is your Care EveryWhere ID. This could be used by other organizations to access your [facility] medical records  [id number]        Equal Access to Services     [facility] Hospital[facility]EL[facility] : Hadkhurram Carlos, scott crawley, mega gomez, jessy palm . So [facility] [phone].    ATENCIÓN: Si habla español, tiene a london disposición servicios gratuitos de asistencia lingüística. Llame al [phone].    We comply with applicable federal civil rights laws and [place] laws. We do not discriminate on the basis of race, color, national origin, age, disability, sex, sexual orientation, or gender identity.               Review of your medicines      START taking        Dose / Directions    acetaminophen 325 MG tablet   Commonly known as:  TYLENOL   Used for:  Generalized pain        Dose:  650 mg   Take 2 tablets (650 mg) by mouth every 4 hours as needed for mild pain (discomfort with fever, fever of 102.5 or greater.)   Quantity:  100 tablet   Refills:  3       dronabinol 2.5 MG capsule   Commonly known as:  MARINOL   Used for:  Nausea, Anorexia        Dose:  2.5 mg   Take 1 capsule (2.5 mg) by mouth 2 times daily   Quantity:  60 capsule   Refills:  0       emollient external cream   Used for:  Rash and nonspecific skin eruption        Apply topically 4 times daily   Quantity:  453 g   Refills:  0       granisetron 1 MG tablet   Commonly known as:  KYTRIL   Used for:  Nausea        Dose:  1 mg   Take 1 tablet (1 mg) by mouth every 12 hours   Quantity:  60 tablet "   Refills:  3       hydrocortisone 1 % external ointment   Commonly known as:  CORTIZONE-10   Used for:  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), Bone marrow transplant candidate        Dose:  0.5 mg/kg   Take 12.5 mLs (25 mg) by mouth every 8 hours as needed for itching   Quantity:  1500 mL   Refills:  1       LORazepam 1 MG tablet   Commonly known as:  ATIVAN   Used for:  Nausea        Dose:  1 mg   Take 1 tablet (1 mg) by mouth 2 times daily   Quantity:  60 tablet   Refills:  0       melatonin 3 MG tablet   Used for:  Insomnia, unspecified type        Dose:  3 mg   Take 1 tablet (3 mg) by mouth At Bedtime   Quantity:  30 tablet   Refills:  3       mycophenolate 500 MG tablet   Commonly known as:  GENERIC EQUIVALENT   Used for:  At 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        Dose:  1000 mg   Take 2 tablets (1,000 mg) by mouth 3 times daily for 11 days   Quantity:  66 tablet   Refills:  0       oxyCODONE 5 MG tablet   Commonly known as:  ROXICODONE   Used for:  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), Bone marrow transplant candidate        Dose:  5 mg   Take 1 tablet (5 mg) by mouth 3 times daily   Quantity:  40 tablet   Refills:  0       pantoprazole 40 MG EC tablet   Commonly known as:  PROTONIX   Used for:  Nausea, On total parenteral nutrition        Dose:  40 mg   Take 1 tablet (40 mg) by mouth 2 times daily   Quantity:  30 tablet   Refills:  3       tacrolimus 1 mg/mL suspension   Commonly known as:  GENERIC EQUIVALENT   Used for:  At 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        Dose:  0.8 mg   Take 0.8 mLs (0.8 mg) by mouth every 12 hours   Quantity:  50 mL   Refills:  3       valACYclovir 1000 mg tablet   Commonly known as:  VALTREX   Indication:  Medication  Treatment to Prevent Cytomegalovirus Disease        Dose:  1000 mg   Take 1 tablet (1,000 mg) by mouth 3 times daily   Quantity:  90 tablet   Refills:  3       * voriconazole 50 MG tablet   Commonly known as:  VFEND        Take three 50 mg tablets and one 200 mg table twice daily. Total dose is 350 mg twice daily.   Quantity:  180 tablet   Refills:  3       * voriconazole 200 MG tablet   Commonly known as:  VFEND        Take three 50 mg tablets and one 200 mg table twice daily. Total dose is 350 mg twice daily.   Quantity:  60 tablet   Refills:  3       * Notice:  This list has 2 medication(s) that are the same as other medications prescribed for you. Read the directions carefully, and ask your doctor or other care provider to review them with you.      CONTINUE these medicines which may have CHANGED, or have new prescriptions. If we are uncertain of the size of tablets/capsules you have at home, strength may be listed as something that might have changed.        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)        Dose:  1 tablet   Start taking on:  12/3/2018   Take 1 tablet by mouth Every Mon, Tues two times daily   Quantity:  20 tablet   Refills:  3         STOP taking     ruxolitinib 25 MG Tabs tablet CHEMO   Commonly known as:  JAKZAIDAI                Where to get your medicines      These medications were sent to [facility] [place], [place] - [address]  [address] [address]     Phone:  [phone]     acetaminophen 325 MG tablet    emollient external cream    granisetron 1 MG tablet    hydrocortisone 1 % external ointment    hydrOXYzine 10 MG/5ML syrup    melatonin 3 MG tablet    mycophenolate 500 MG tablet    pantoprazole 40 MG EC tablet    sulfamethoxazole-trimethoprim 800-160 MG tablet     tacrolimus 1 mg/mL suspension    valACYclovir 1000 mg tablet    voriconazole 200 MG tablet    voriconazole 50 MG tablet         Some of these will need a paper prescription and others can be bought over the counter. Ask your nurse if you have questions.     Bring a paper prescription for each of these medications     dronabinol 2.5 MG capsule    LORazepam 1 MG tablet    oxyCODONE 5 MG tablet                Protect others around you: Learn how to safely use, store and throw away your medicines at www.disposemymeds.org.        ANTIBIOTIC INSTRUCTION     You've Been Prescribed an Antibiotic - Now What?  Your healthcare team thinks that you or your loved one might have an infection. Some infections can be treated with antibiotics, which are powerful, life-saving drugs. Like all medications, antibiotics have side effects and should only be used when necessary. There are some important things you should know about your antibiotic treatment.      Your healthcare team may run tests before you start taking an antibiotic.    Your team may take samples (e.g., from your blood, urine or other areas) to run tests to look for bacteria. These test can be important to determine if you need an antibiotic at all and, if you do, which antibiotic will work best.      Within a few days, your healthcare team might change or even stop your antibiotic.    Your team may start you on an antibiotic while they are working to find out what is making you sick.    Your team might change your antibiotic because test results show that a different antibiotic would be better to treat your infection.    In some cases, once your team has more information, they learn that you do not need an antibiotic at all. They may find out that you don't have an infection, or that the antibiotic you're taking won't work against your infection. For example, an infection caused by a virus can't be treated with antibiotics. Staying on an antibiotic when you don't need  it is more likely to be harmful than helpful.      You may experience side effects from your antibiotic.    Like all medications, antibiotics have side effects. Some of these can be serious.    Let you healthcare team know if you have any known allergies when you are admitted to the hospital.    One significant side effect of nearly all antibiotics is the risk of severe and sometimes deadly diarrhea caused by Clostridium difficile (C. Difficile). This occurs when a person takes antibiotics because some good germs are destroyed. Antibiotic use allows C. diificile to take over, putting patients at high risk for this serious infection.    As a patient or caregiver, it is important to understand your or your loved one's antibiotic treatment. It is especially important for caregivers to speak up when patients can't speak for themselves. Here are some important questions to ask your healthcare team.    What infection is this antibiotic treating and how do you know I have that infection?    What side effects might occur from this antibiotic?    How long will I need to take this antibiotic?    Is it safe to take this antibiotic with other medications or supplements (e.g., vitamins) that I am taking?     Are there any special directions I need to know about taking this antibiotic? For example, should I take it with food?    How will I be monitored to know whether my infection is responding to the antibiotic?    What tests may help to make sure the right antibiotic is prescribed for me?      Information provided by:  www.cdc.gov/getsmart  U.S. Department of Health and Human Services  Centers for disease Control and Prevention  National Center for Emerging and Zoonotic Infectious Diseases  Division of Healthcare Quality Promotion        Information about OPIOIDS     PRESCRIPTION OPIOIDS: WHAT YOU NEED TO KNOW   We gave you an opioid (narcotic) pain medicine. It is important to manage your pain, but opioids are not always the  best choice. You should first try all the other options your care team gave you. Take this medicine for as short a time (and as few doses) as possible.    Some activities can increase your pain, such as bandage changes or therapy sessions. It may help to take your pain medicine 30 to 60 minutes before these activities. Reduce your stress by getting enough sleep, working on hobbies you enjoy and practicing relaxation or meditation. Talk to your care team about ways to manage your pain beyond prescription opioids.    These medicines have risks:    DO NOT drive when on new or higher doses of pain medicine. These medicines can affect your alertness and reaction times, and you could be arrested for driving under the influence (DUI). If you need to use opioids long-term, talk to your care team about driving.    DO NOT operate heavy machinery    DO NOT do any other dangerous activities while taking these medicines.    DO NOT drink any alcohol while taking these medicines.     If the opioid prescribed includes acetaminophen, DO NOT take with any other medicines that contain acetaminophen. Read all labels carefully. Look for the word  acetaminophen  or  Tylenol.  Ask your pharmacist if you have questions or are unsure.    You can get addicted to pain medicines, especially if you have a history of addiction (chemical, alcohol or substance dependence). Talk to your care team about ways to reduce this risk.    All opioids tend to cause constipation. Drink plenty of water and eat foods that have a lot of fiber, such as fruits, vegetables, prune juice, apple juice and high-fiber cereal. Take a laxative (Miralax, milk of magnesia, Colace, Senna) if you don t move your bowels at least every other day. Other side effects include upset stomach, sleepiness, dizziness, throwing up, tolerance (needing more of the medicine to have the same effect), physical dependence and slowed breathing.    Store your pills in a secure place, locked if  possible. We will not replace any lost or stolen medicine. If you don t finish your medicine, please throw away (dispose) as directed by your pharmacist. The [place] Pollution Control Agency has more information about safe disposal: https://www.pca.state.[place]/living-green/managing-unwanted-medications             Medication List: This is a list of all your medications and when to take them. Check marks below indicate your daily home schedule. Keep this list as a reference.      Medications           Morning Afternoon Evening Bedtime As Needed    acetaminophen 325 MG tablet   Commonly known as:  TYLENOL   Take 2 tablets (650 mg) by mouth every 4 hours as needed for mild pain (discomfort with fever, fever of 102.5 or greater.)   Last time this was given:  650 mg on 11/9/2018  9:55 AM                                dronabinol 2.5 MG capsule   Commonly known as:  MARINOL   Take 1 capsule (2.5 mg) by mouth 2 times daily   Last time this was given:  2.5 mg on 11/27/2018 12:16 PM                                emollient external cream   Apply topically 4 times daily   Last time this was given:  1 Dose on 11/26/2018  9:08 PM                                granisetron 1 MG tablet   Commonly known as:  KYTRIL   Take 1 tablet (1 mg) by mouth every 12 hours   Last time this was given:  1 mg on 11/27/2018 12:16 PM                                hydrocortisone 1 % external ointment   Commonly known as:  CORTIZONE-10   Apply sparingly to affected area, rash on neck three times a day.                                hydrOXYzine 10 MG/5ML syrup   Commonly known as:  ATARAX   Take 12.5 mLs (25 mg) by mouth every 8 hours as needed for itching                                LORazepam 1 MG tablet   Commonly known as:  ATIVAN   Take 1 tablet (1 mg) by mouth 2 times daily   Last time this was given:  1 mg on 11/27/2018 12:15 PM                                melatonin 3 MG tablet   Take 1 tablet (3 mg) by mouth At Bedtime   Last time  this was given:  3 mg on 11/26/2018  9:05 PM                                mycophenolate 500 MG tablet   Commonly known as:  GENERIC EQUIVALENT   Take 2 tablets (1,000 mg) by mouth 3 times daily for 11 days   Last time this was given:  1,000 mg on 11/27/2018 12:15 PM                                oxyCODONE 5 MG tablet   Commonly known as:  ROXICODONE   Take 1 tablet (5 mg) by mouth 3 times daily   Last time this was given:  5 mg on 11/27/2018 12:15 PM                                pantoprazole 40 MG EC tablet   Commonly known as:  PROTONIX   Take 1 tablet (40 mg) by mouth 2 times daily   Last time this was given:  40 mg on 11/27/2018 12:16 PM                                sulfamethoxazole-trimethoprim 800-160 MG tablet   Commonly known as:  BACTRIM DS/SEPTRA DS   Take 1 tablet by mouth Every Mon, Tues two times daily   Start taking on:  12/3/2018                                tacrolimus 1 mg/mL suspension   Commonly known as:  GENERIC EQUIVALENT   Take 0.8 mLs (0.8 mg) by mouth every 12 hours   Last time this was given:  0.8 mg on 11/27/2018 12:14 PM                                valACYclovir 1000 mg tablet   Commonly known as:  VALTREX   Take 1 tablet (1,000 mg) by mouth 3 times daily   Last time this was given:  1,000 mg on 11/27/2018 12:21 PM                                * voriconazole 50 MG tablet   Commonly known as:  VFEND   Take three 50 mg tablets and one 200 mg table twice daily. Total dose is 350 mg twice daily.   Last time this was given:  11/26/2018  9:04 PM                                * voriconazole 200 MG tablet   Commonly known as:  VFEND   Take three 50 mg tablets and one 200 mg table twice daily. Total dose is 350 mg twice daily.   Last time this was given:  11/26/2018  9:04 PM                                * Notice:  This list has 2 medication(s) that are the same as other medications prescribed for you. Read the directions carefully, and ask your doctor or other care provider to review  them with you.

## 2018-10-28 NOTE — DISCHARGE SUMMARY
Pediatric Bone Marrow Transplant Discharge Summary   [facility]     Admission Date: 10/28/18  Discharge Date:  11/27/2018  Discharging Physician: Dr. 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 He was diagnosed on 2/5/18 during work-up for nonexertional syncope. WBC on diagnosis was 8,000, CNS negative. Cytogenetics showed loss of IKZF1, LDA pos for Ph-like ALL, CRLF2 low, Fusion [id number] - activating JAK2. He underwent fertility preservation with sperm collection and then began induction chemotherapy per [id number] on 2/15/18. Day 8 LP was negative, Day 8 MRD 5.2%. His end of induction marrow on 3/15/18 showed 31% blasts per [place] lab, 8% by morphology and was determined have shown induction failure. Following this he received Inotuzumab and iT Methotrexate with post-evaluations revealing  MRD 0.25%, HR consolidative treatment per [id number] (5/4-6/22) with post-evaluations revealing MRD 0.056% , off-study interim maintenance (7/11-8/22) with post-evaluations revealing MRD 0.014%, and Etoposide, Cytoxan, IT Methotrexate, and Ruxolitinib with post and latest evaluations (10/15/18) revealing MRD 0.006% per flow cytometry.       Disease and treatment associated complications have been fairly minimal. No known organ compromise, no remarkable infectious history. He required one transfusion of platelets, no significant history nor perceived bleeding susceptibility. Artemio has maintained his appetite and nutritional status decently throughout treatment without supplementation via enteral feeds or TPN. He has recently been feeling well with good energy. He denies any recent respiratory symptoms, nausea/vomiting, stool changes, rashes or fever. No known sick contacts.       Of important note, Artemio's medical history is also significant for Aaron-Parkinson-White,  confirmed upon EKG at around the time of diagnosis/induction. He was evaluated by Dr. Plummer on 10/22. Echo was normal, with no AV block and 10 PACs.     Past Medical History  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)      WPW (Aaron-Parkinson-White syndrome) 03/2018       Past Surgical History  Past Surgical History:   Procedure Laterality Date     O CLAVICLE LEFT Left     fracture with surgical repair and plate/screws     ORTHOPEDIC SURGERY      femur       Discharge Medications:   Marinol 2.5 mg twice daily take 1 hour prior to meals appetite stimulant  Kytril 1 mg every 12 hours scheduled for nausea  1 mg twice daily  Melatonin 3 mg at bedtime  Mycophenolate 1000 mg 3 times a day  Oxycodone 5 mg 3 times a day  Protonix 40 mg 2 times daily  Tacrolimus 0.8 mg 2 times daily  Valtrex thousand milligrams 3 times daily  Voriconazole 350 mg every 12 hours  Bactrim 160 mg every Monday and Tuesday twice daily if ANC is greater than 1000 x 2 consecutive days and greater than a day +28  Atarax 25 mg as needed every 8 hours   Vanni cream applied to neck up to 4 times a day  Crevae lotion applying to neck  Up to 4 x a day  Hydrocortisone 1% cream to be delivered to the [facility] clinic on 11/28 applied to the rash on neck       Allergies      Allergies   Allergen Reactions     Blood Transfusion Related (Informational Only) Other (See Comments)     Stem cell transplant patient.  Give type O RBCs.     No Known Drug Allergies        Discharge Physical Exam   General: Half awake after bone marrow biopsy mom present, later father and stepmother present.   HEENT:  Pale,NC/AT, alopecia.  No conjunctivitis, sclera anicteric, nares patent. Mucous membranes moist. Rash on left side of his neck, flat papules mild erythema, and less confluenttoday,   CV: RRR. Normal S1 and S2. No murmurs, rubs or gallops, Warm, well-perfused.    Resp: CTAB. Normal work and rate of breathing. No wheezing  or crackles.  Abd: Soft, nondistended.  Neuro: alert and appropriate response, CN II-XII grossly intact, Normal strength and tone throughout.    MSK: Moving all extremities equally. No peripheral edema. No swelling or erythema.   Skin:  Without bruising or petechiae. Rash in very small faint patches on bilateral forearms  Access: Galvan, and left side port a cath (not accessed).     Hospital Course   High risk ALL/transplant: Isael was admitted for treatment for his high risk ALL.   His preparative regimen consisted of TBI BID (day -4 to -1) and post transplant Cytoxan (day +3 to +4) with a matched sibling transplant 11/2/2018 per protocol 2015-29. He engrafted on day +15. He had his day +21 bone marrow biopsy on the day of discharge and results are pending. Bone marrow biopsy site was covered with a pressure dressing at discharge, dressing was clean, dry and intact. Complications to date include nausea, C.diff infection (treat and symptoms resolved), as well as weight loss.     Artemio started tacrolimus and MMF on day +5 per protocol. Tacro goal was 10-15 until day +14, then goal was decreased to 5-10 per protocol. Tacro adjusted frequently to maintain therapeutic levels, and transitioned to oral dosing without issues. Last tacro level was Monday 11/26 on 9.0. He will need a repeat level at his first clinic appointment on 11/28/18.    - 11/27 MMF continues until day +35 or stop 7 days after an ANC greater than 500 whichever  occurs later per protocol.        FEN/Renal: Given Isael's risk for fluid/electrolyte imbalance, his weight and electrolytes were monitored daily, twice daily during Cytoxan therapy, along with diligent input/output measurements. He also is under risk for malnutrition throughout therapy, thus his nutritional intake was monitored carefully. He continued on an age appropriate diet, and later required TPN and lipids due to mucositis/appetite suppression. At discharge he was on TPN and IL over  12 hours at night. He was started on marinol on 11/25 for appetite stimulation and will continue this as an outpatient. Of note, Artemio's weight trended down during transplant, remains below his admission weight of 65.1kg. Artemio's weight  at the time of discharge 60.9kg as of 11/26. Artemio was monitored closely for signs of TMA, at discharge his most recent LDH level was 226 and urine/protein creatinine ratio was 0.30.     Pulmonary: Isael's respiratory status was monitored carefully with no concern for insufficiency. Isael was noted to have a  pneumomediastinum on his work up chest CT. Radiolgy deemed it likely benign, perhaps transient. He was monitored closely for signs of shortness of breath, dyspnea or remarkable cough. He had a couple of episodes of shortness of breath early after transplant, but no issues as he recovered, and no issues at the time of discharge.    Cardiovascular: Isael was at risk for hypertension secondary to medications.  He has a history of Aaron Parkinson White syndrome.  He completed a Ziopatch Holter prior to prep which revealed no AV block and 10 PACs.  His electrolytes were aggressively replaced per sliding scale to keep K>3.5, Mag>1.8, and normal ical. During transplant his IV fluids (TKO) included KCL until 24 hours prior to discharge. Monitor potassium with possible need for additional supplmentation in TPN or orally. Hydralazine was available as needed. At the time he was discharged he was not on any anti-hypertensive medications.    Heme: Isael experienced the expected cytopenias secondary to chemotherapy. He was transfused for hemoglobin < 8, platelets < 10,000. His platelet parameter was increased for a time to 30,000 while on defibrotide , but was decreased back to 10,000 at discharge. His most recent transfusions platelets on 11/27 overnight as overnight provider concerned that platelets should be 50,000 for his bone marrow biopsy.  Isael was given as a premedication  for pRBCs. GCSF was started day +5 per protocol and was continued until ANC criteria was met. He will now only need PRN for ANC < 1000.     Infectious Disease: Isael had a fever during conditioning and was placed on empiric cefepime. This continued until he was afebrile and engrafted. Blood cultures remained no growth to date. He continues on Acyclovir for viral prophylaxis due to CMV + serology pre-transplant which was transitioned to valtrex prior to discharge. Continue to check weekly CMVs, most recently 11/23 not detected. Artemio continued to receive micafungin for fungal prophylaxis at admission and was transitioned to voriconazole when he was able to take oral medications. His initial voriconazole level was checked 11/26 and resulted as 0.9, goal is 1.5 to 5.0.  Dose was adjusted and he will need a recheck in one week ( draw 12/3 to run 12/4). Dose  Bactrim to start day +28 for PJP prophylaxis if WBC criteria met.    Clostridium Difficile: Isael was noted to be positive for c.diff stool infection and was treated with a course of oral vancomycin.    Endocrine:  Artemio had no endocrine concerns during his transplant course.    GI: Isael's nausea management consisted of kytril during conditioning regimen, and also phenergan, Ativan, marinol, and benadryl as needed. He also benefited nicely from integrative therapies such as non-invasive accupressure, music therapy, and magnet therapy.  Due to his risk for VOD, high dose ursodiol was initiated as well as prophylactic defibrotide. Defibrotide was stopped after engraftment and ursodiol was stopped prior to discharge with Artemio starting to eat.    Derm: Isael experienced skin breakdown in his dominga anal area as well as hemorrhoids. He was offered several topical treatments which he declined with the exception of Tucks pads. He reported benefit from IV benadryl which provided relief from the associated pruritis. No recent complaints.     Neuro: Isael experienced  anticipated mucositis/pain associated with chemotherapy and transplant.  His analgesic regimen consisted of oxycodone PRN, which was later transitioned to morphine continuous infusion with PCA doses and weaned from there. At discharge Isael is taking oxycodone 5mg TID po with no additional prn doses. Isael is aware he will wean off his oxycodone once outpatient.  Isael also struggled with insomnia secondary to intermittent pain and medical staff cares. Plan was established to benefit both patient and staff and was successful per both sides. He was started on nightly melatonin to help with sleep as well. Isael likes to get up late in am not before 10am, he is aware that drug levels will reguire him getting up earlier for his labs and follow-up exams at the clinic.     ENT: Artemio was noted to have intermittent epistaxis throughout his transplant which was treated with topical ayr gel and saline spray as well as transfusions as needed.     Disposition:  Artemio  will return to clinic on 11/28/18 for first discharge follow and exam with POLLO.  Arrive to clinic on 11/28 at  8:30 am for labs followed by appointment with  Janee Olmos PA-C at 8:45am. Please hold your morning  Tacrolimus dose for drug level at first appt 11/28     The above plan of care was developed by and communicated to me by the Pediatric BMT attending physician, Dr. Joshua.    Lilia Prabhakar MSN, CPNP-  Pediatric Blood and Marrow Transplant Program  [facility] [phone]  Pager [phone]      BMT Attending Note:    I evaluated and examined Artemio Nino today, and reviewed the vital signs, medications and laboratory data.  This was discussed with the team, as well as the family.  He had his bone marrow biopsy/aspirate done earlier today, and he seemed to tolerate this well.      The decision was made that a discharge today would be appropriate.  The necessary coordination was done, and >30 minutes of time was required to accomplish this.   Follow-up has been arranged for the family as deemed appropriate.    Candido Joshua MD  Professor, Division of Blood and Marrow Transplantation  Department of Pediatrics  [phone]

## 2018-10-28 NOTE — PLAN OF CARE
Problem: Stem Cell/Bone Marrow Transplant (Adult)  Goal: Signs and Symptoms of Listed Potential Problems Will be Absent, Minimized or Managed (Stem Cell/Bone Marrow Transplant)  Signs and symptoms of listed potential problems will be absent, minimized or managed by discharge/transition of care (reference Stem Cell/Bone Marrow Transplant (Adult) CPG).   Patient stable, admitted to unit at 1030. Oriented to unit and did admit packet with BMT RN. Labs drawn and planning for rest day until TBI tomorrow.

## 2018-10-28 NOTE — H&P
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 He was diagnosed on 2/5/18 during work-up for nonexertional syncope. WBC on diagnosis was 8,000, CNS negative. Cytogenetics showed loss of IKZF1, LDA pos for Ph-like ALL, CRLF2 low, Fusion [id number] - activating JAK2. He underwent fertility preservation with sperm collection and then began induction chemotherapy per [id number] on 2/15/18. Day 8 LP was negative, Day 8 MRD 5.2%. His end of induction marrow on 3/15/18 showed 31% blasts per [place] lab, 8% by morphology and was determined have shown induction failure. Following this he received Inotuzumab and iT Methotrexate with post-evaluations revealing  MRD 0.25%, HR consolidative treatment per [id number] (5/4-6/22) with post-evaluations revealing MRD 0.056% , off-study interim maintenance (7/11-8/22) with post-evaluations revealing MRD 0.014%, and Etoposide, Cytoxan, IT Methotrexate, and Ruxolitinib with post and latest evaluations (10/15/18) revealing MRD 0.006% per flow cytometry.      Disease and treatment associated complications have been fairly minimal. No known organ compromise, no remarkable infectious history. He required one transfusion of platelets, no significant history nor perceived bleeding susceptibility. Artemio has maintained his appetite and nutritional status decently throughout treatment without supplementation via enteral feeds or TPN. He has recently been feeling well with good energy. He denies any recent respiratory symptoms, nausea/vomiting, stool changes, rashes or fever. No known sick contacts.      Of important note, Artemio's medical history is also significant for Aaron-Parkinson-White, confirmed upon EKG at around the time of diagnosis/induction. He was evaluated by Dr. Plummer on 10/22.  Echo was normal, but Holter results pending at the time of admission.     ROS: A complete review of systems is negative except as noted in HPI    Past Medical History  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)      WPW (Aaron-Parkinson-White syndrome) 03/2018       Past Surgical History  Past Surgical History:   Procedure Laterality Date     O CLAVICLE LEFT Left     fracture with surgical repair and plate/screws     ORTHOPEDIC SURGERY      femur       Family History  Maternal grandfather: thrombocytopenia  Paternal grandfather: Prostate Cancer, type 2 diabetes.     Social History  Lives with grandmother and grandfather. Mother is involved and has moved back. Father lives in the area. Artemio finished high school and some college. He worked as a  with his father occasionally.    Medications    No current facility-administered medications on file prior to encounter.   Current Outpatient Prescriptions on File Prior to Encounter:  ruxolitinib (JAKAFI) 25 MG TABS tablet CHEMO Current dose 65 mg twice daily   sulfamethoxazole-trimethoprim (BACTRIM DS/SEPTRA DS) 800-160 MG per tablet Take 2 tablets by mouth Every Mon, Tues two times daily       Allergies      Allergies   Allergen Reactions     Blood Transfusion Related (Informational Only) Other (See Comments)     Stem cell transplant patient.  Give type O RBCs.     No Known Drug Allergies        Physical Exam   Temp:  [98.1  F (36.7  C)] 98.1  F (36.7  C)  Heart Rate:  [72] 72  Resp:  [16] 16  BP: (108)/(68) 108/68  Cuff Mean (mmHg):  [79] 79    General: Alert, pleasant and interactive, no acute distress  HEENT: NC/AT, alopecic.  PERRLA. No conjunctivitis, sclera anicteric, nares patent. Mucous membranes moist. Lips dry. Dentition normal   CV: RRR. Normal S1 and S2. No murmurs, rubs or gallops, Warm, well-perfused.    Resp: CTAB. Normal work and rate of breathing. No wheezing or crackles.  Abd: Soft, nondistended.  Bowel sounds appreciated  Neuro: Alert and appropriate response, no focal defecits  Ext:  No peripheral edema. No swelling or erythema.    Skin: Mild follicular appearing lesions (approximately 3, small in diameter) on right cheek. No other rashes, bruising or petechiae.    Access: Galvan, Port a Cath    Labs  No results found for this or any previous visit (from the past 24 hour(s)).    Assessment and Plan     Artemio is a 18 year old male with VHR B cell ALL + JAK2 activation, admitted for matched sibling bone marrow transplant. Clinically well appearing.      BMT:  # High risk B cell ALL (CNS negative) + JAK2 activation/BMT: Latest bone marrow biopsy (10/15) MRD 0.006%  - Ruxolitinib continued until admission  - Conditioning per 2015-29: TBI days -4 through -1 followed by anticipated MSD transplant on 11/2      # Risk for GVHD:   - Post-transplant Cytoxan days +3 and +4  - Tacrolimus and MMF to start day +5     FEN/Renal:  # Risk for malnutrition: no history of TPN or enteral nutrition dependence   - Dietician consult upon admission, to follow   - Continue age appropriate diet      # Risk for electrolyte abnormalities:  - check daily electrolytes      # Risk for renal dysfunction and fluid overload:  - monitor I/O's and daily weights upon admission      # Risk for aHUS/TA-TMA: surveillance to initiate upon admission   - monitor LDH qMonday  - monitor urine protein/creatinine qTuesday     Pulmonary:  # Risk for pulmonary toxicity secondary to chemotherapy:   - PFTs scheduled for 10/24     # Rhinorrhea: 3 mos history, mild. Sinus CT (10/22) with findings of mucosal thickening without evidence of active infection  - RVP 10/22 resulted negative     # Pneumomediastinum: finding upon work up Chest CT (10/22), asymptomatic. Per radiology, likely benign, perhaps transient.   - Patient instructed to notify care team with any SOB, dyspnea, or remarkable cough     Cardiovascular:  # Aaron-Parkinson-White Syndrome:  diagnosed upon syncope in 02/2018. Asymptomatic, no interventions to date. Work up EKG c/w WPW, sinus bradycardia at 49 bpm. Work up ECHO normal with EF 68%.   - Cardiology visit with Dr. Plummer (10/22): Holter monitor applied, results pending. Contacted Cardiology to obtain results prior to starting preparative regimen. Normal Echocardiogram with EF 68%.   - Of note, Isael's HR has been measured at high 30s while asleep during previous hospitalizations.        Heme:   # Pancytopenia secondary to chemotherapy: hx of one platelet transfusion, well tolerated  - Transfuse for hemoglobin < 8, platelets < 30,000 due to Defibrotide  - No previous premedications, monitor for need  - GCSF to start day +5 until ANC >1500 x3     Infectious Disease:  # Risk for infection given immunocompromised status: IDMs pending  Active: 3 mos hx of mild rhinorrhea. Chest/sinus CT without evidence of infection. 10/22 RVP negative.     Prophylaxis:                                                                                                                        - viral prophylaxis: CMV neg (donor CMV positive), HSV positive. Acyclovir Day -4   - fungal prophylaxis: high risk- to start Micafungin upon admission   - bacterial prophylaxis: Levofloxacin to start Day -1     Past infections: none significant      GI:   # Nausea management:   - Scheduled medications: zofran gtt to initiate upon TBI  - PRN medications: consider ativan and benadryl      # Risk for VOD: especially high risk given previous Inotuzumab therapy. Start prophylactic Defibrotide on admission.   - Ursodiol TID (10 ml/kg)     Neuro:  # Mucositis/pain (anticipated):   - Analgesic therapy as indicated  - Integrative therapy      The above plan of care was developed by and communicated to me by the   Pediatric BMT attending physician, Dr. Viky Zavala.     Ester Ochoa MD  Pediatric BMT Hospitalist    BMT Attending Note:    Artemio was seen and evaluated by me  today.     The significant interval history includes admission for transplant.  No current issues.      I have reviewed changes and data from the last 24 hours, including medications, laboratory results, vital signs, radiograph results, consultant recommendations and pathology results.     I have formulated and discussed the plan with the BMT team. I discussed the course and plan with the patient/family and answered all of their questions to the best of my ability. I counseled them regarding the followin yo with VHR B cell ALL here for matched sibling transplant with TBI and post transplant cytoxan, at risk for nausea and vomiting- zofran drip, at risk for VOD- defibrotide and ursodiol, at risk for malnutrition- will follow closely, at risk of opportunistic infection- prophylaxis with levo, acyclovir and hannah, history of WPW- will discuss with cardiology.  He starts TBI in the am.    My care coordination activities today include oversight of planned lab studies, oversight of planned radiographic studies, oversight of medication changes, discussion with BMT team-members and discussion with consultants.    My total floor time today was at least 75 minutes, greater than 50% of which was counseling and coordination of care.      Viky Zavala MD, PhD    Pediatric Blood and Marrow Transplant  [facility]        Patient Active Problem List   Diagnosis     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)     Aaron-Parkinson-White (WPW) syndrome     Bone marrow transplant candidate     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)

## 2018-10-29 ENCOUNTER — APPOINTMENT (OUTPATIENT)
Dept: RADIATION ONCOLOGY | Facility: CLINIC | Age: 18
End: 2018-10-29
Attending: RADIOLOGY
Payer: COMMERCIAL

## 2018-10-29 ENCOUNTER — ONCOLOGY VISIT (OUTPATIENT)
Dept: RADIATION ONCOLOGY | Facility: CLINIC | Age: 18
End: 2018-10-29

## 2018-10-29 LAB
ALBUMIN SERPL-MCNC: 3.5 G/DL (ref 3.4–5)
ALP SERPL-CCNC: 25 U/L (ref 40–150)
ALT SERPL W P-5'-P-CCNC: 24 U/L (ref 0–70)
ANION GAP SERPL CALCULATED.3IONS-SCNC: 6 MMOL/L (ref 3–14)
APTT PPP: 29 SEC (ref 22–37)
AST SERPL W P-5'-P-CCNC: 30 U/L (ref 0–45)
BASOPHILS # BLD AUTO: 0 10E9/L (ref 0–0.2)
BASOPHILS NFR BLD AUTO: 0.3 %
BILIRUB DIRECT SERPL-MCNC: <0.1 MG/DL (ref 0–0.2)
BILIRUB SERPL-MCNC: 0.3 MG/DL (ref 0.2–1.3)
BUN SERPL-MCNC: 8 MG/DL (ref 7–30)
CALCIUM SERPL-MCNC: 8.4 MG/DL (ref 8.5–10.1)
CHLORIDE SERPL-SCNC: 109 MMOL/L (ref 94–109)
CMV DNA SPEC NAA+PROBE-ACNC: NORMAL [IU]/ML
CMV DNA SPEC NAA+PROBE-LOG#: NORMAL {LOG_IU}/ML
CO2 SERPL-SCNC: 26 MMOL/L (ref 20–32)
CREAT SERPL-MCNC: 0.74 MG/DL (ref 0.66–1.25)
DIFFERENTIAL METHOD BLD: ABNORMAL
DLCOUNC-%PRED-PRE: 73 %
DLCOUNC-PRE: 27.16 ML/MIN/MMHG
DLCOUNC-PRED: 36.9 ML/MIN/MMHG
EOSINOPHIL # BLD AUTO: 0 10E9/L (ref 0–0.7)
EOSINOPHIL NFR BLD AUTO: 1.1 %
ERV-%PRED-PRE: 103 %
ERV-PRE: 2.16 L
ERV-PRED: 2.08 L
ERYTHROCYTE [DISTWIDTH] IN BLOOD BY AUTOMATED COUNT: 15.5 % (ref 10–15)
EXPTIME-PRE: 5.08 SEC
FEF2575-%PRED-PRE: 95 %
FEF2575-PRE: 4.68 L/SEC
FEF2575-PRED: 4.88 L/SEC
FEFMAX-%PRED-PRE: 103 %
FEFMAX-PRE: 10.07 L/SEC
FEFMAX-PRED: 9.76 L/SEC
FEV1-%PRED-PRE: 110 %
FEV1-PRE: 4.92 L
FEV1FEV6-PRE: 82 %
FEV1FEV6-PRED: 85 %
FEV1FVC-PRE: 82 %
FEV1FVC-PRED: 84 %
FEV1SVC-PRE: 87 %
FEV1SVC-PRED: 79 %
FIBRINOGEN PPP-MCNC: 300 MG/DL (ref 200–420)
FIFMAX-PRE: 9.31 L/SEC
FRCPLETH-%PRED-PRE: 119 %
FRCPLETH-PRE: 3.83 L
FRCPLETH-PRED: 3.21 L
FVC-%PRED-PRE: 114 %
FVC-PRE: 6.01 L
FVC-PRED: 5.23 L
GFR SERPL CREATININE-BSD FRML MDRD: >90 ML/MIN/1.7M2
GLUCOSE SERPL-MCNC: 132 MG/DL (ref 70–99)
HCT VFR BLD AUTO: 25.1 % (ref 40–53)
HGB BLD-MCNC: 8.3 G/DL (ref 13.3–17.7)
IC-%PRED-PRE: 97 %
IC-PRE: 3.5 L
IC-PRED: 3.57 L
IMM GRANULOCYTES # BLD: 0.1 10E9/L (ref 0–0.4)
IMM GRANULOCYTES NFR BLD: 2.1 %
INR PPP: 1.03 (ref 0.86–1.14)
LYMPHOCYTES # BLD AUTO: 0.2 10E9/L (ref 0.8–5.3)
LYMPHOCYTES NFR BLD AUTO: 5.8 %
MAGNESIUM SERPL-MCNC: 2.2 MG/DL (ref 1.6–2.3)
MCH RBC QN AUTO: 28.6 PG (ref 26.5–33)
MCHC RBC AUTO-ENTMCNC: 33.1 G/DL (ref 31.5–36.5)
MCV RBC AUTO: 87 FL (ref 78–100)
MONOCYTES # BLD AUTO: 0.8 10E9/L (ref 0–1.3)
MONOCYTES NFR BLD AUTO: 19.8 %
NEUTROPHILS # BLD AUTO: 2.7 10E9/L (ref 1.6–8.3)
NEUTROPHILS NFR BLD AUTO: 70.9 %
NRBC # BLD AUTO: 0.1 10*3/UL
NRBC BLD AUTO-RTO: 1 /100
PHOSPHATE SERPL-MCNC: 4.9 MG/DL (ref 2.5–4.5)
PLATELET # BLD AUTO: 149 10E9/L (ref 150–450)
POTASSIUM SERPL-SCNC: 4 MMOL/L (ref 3.4–5.3)
PROT SERPL-MCNC: 6.2 G/DL (ref 6.8–8.8)
RBC # BLD AUTO: 2.9 10E12/L (ref 4.4–5.9)
RVPLETH-%PRED-PRE: 104 %
RVPLETH-PRE: 1.68 L
RVPLETH-PRED: 1.6 L
SODIUM SERPL-SCNC: 141 MMOL/L (ref 133–144)
SPECIMEN SOURCE: NORMAL
TLCPLETH-%PRED-PRE: 107 %
TLCPLETH-PRE: 7.33 L
TLCPLETH-PRED: 6.83 L
VA-%PRED-PRE: 106 %
VA-PRE: 6.97 L
VC-%PRED-PRE: 100 %
VC-PRE: 5.65 L
VC-PRED: 5.65 L
WBC # BLD AUTO: 3.8 10E9/L (ref 4–11)

## 2018-10-29 PROCEDURE — 77331 SPECIAL RADIATION DOSIMETRY: CPT | Performed by: RADIOLOGY

## 2018-10-29 PROCEDURE — 82248 BILIRUBIN DIRECT: CPT | Performed by: NURSE PRACTITIONER

## 2018-10-29 PROCEDURE — 86901 BLOOD TYPING SEROLOGIC RH(D): CPT | Performed by: NURSE PRACTITIONER

## 2018-10-29 PROCEDURE — 83735 ASSAY OF MAGNESIUM: CPT | Performed by: NURSE PRACTITIONER

## 2018-10-29 PROCEDURE — 85610 PROTHROMBIN TIME: CPT | Performed by: NURSE PRACTITIONER

## 2018-10-29 PROCEDURE — 84100 ASSAY OF PHOSPHORUS: CPT | Performed by: NURSE PRACTITIONER

## 2018-10-29 PROCEDURE — 25000128 H RX IP 250 OP 636: Performed by: PEDIATRICS

## 2018-10-29 PROCEDURE — 85025 COMPLETE CBC W/AUTO DIFF WBC: CPT | Performed by: NURSE PRACTITIONER

## 2018-10-29 PROCEDURE — 86900 BLOOD TYPING SEROLOGIC ABO: CPT | Performed by: NURSE PRACTITIONER

## 2018-10-29 PROCEDURE — 25000132 ZZH RX MED GY IP 250 OP 250 PS 637: Performed by: PEDIATRICS

## 2018-10-29 PROCEDURE — C9399 UNCLASSIFIED DRUGS OR BIOLOG: HCPCS | Performed by: PEDIATRICS

## 2018-10-29 PROCEDURE — 25000128 H RX IP 250 OP 636: Performed by: NURSE PRACTITIONER

## 2018-10-29 PROCEDURE — 80053 COMPREHEN METABOLIC PANEL: CPT | Performed by: NURSE PRACTITIONER

## 2018-10-29 PROCEDURE — 77412 RADIATION TX DELIVERY LVL 3: CPT | Mod: XE | Performed by: RADIOLOGY

## 2018-10-29 PROCEDURE — 77412 RADIATION TX DELIVERY LVL 3: CPT | Performed by: RADIOLOGY

## 2018-10-29 PROCEDURE — 20000002 ZZH R&B BMT INTERMEDIATE

## 2018-10-29 PROCEDURE — 86850 RBC ANTIBODY SCREEN: CPT | Performed by: NURSE PRACTITIONER

## 2018-10-29 PROCEDURE — 85730 THROMBOPLASTIN TIME PARTIAL: CPT | Performed by: NURSE PRACTITIONER

## 2018-10-29 PROCEDURE — 25000132 ZZH RX MED GY IP 250 OP 250 PS 637: Performed by: NURSE PRACTITIONER

## 2018-10-29 PROCEDURE — 85384 FIBRINOGEN ACTIVITY: CPT | Performed by: NURSE PRACTITIONER

## 2018-10-29 PROCEDURE — 77332 RADIATION TREATMENT AID(S): CPT | Performed by: RADIOLOGY

## 2018-10-29 PROCEDURE — 25800025 ZZH RX 258: Performed by: PEDIATRICS

## 2018-10-29 PROCEDURE — 87081 CULTURE SCREEN ONLY: CPT | Performed by: NURSE PRACTITIONER

## 2018-10-29 RX ORDER — NALOXONE HYDROCHLORIDE 0.4 MG/ML
.1-.4 INJECTION, SOLUTION INTRAMUSCULAR; INTRAVENOUS; SUBCUTANEOUS
Status: DISCONTINUED | OUTPATIENT
Start: 2018-10-29 | End: 2018-11-27 | Stop reason: HOSPADM

## 2018-10-29 RX ORDER — OXYCODONE HYDROCHLORIDE 5 MG/1
5 TABLET ORAL EVERY 4 HOURS PRN
Status: DISCONTINUED | OUTPATIENT
Start: 2018-10-29 | End: 2018-10-31

## 2018-10-29 RX ADMIN — ACYCLOVIR SODIUM 700 MG: 50 INJECTION, SOLUTION INTRAVENOUS at 15:02

## 2018-10-29 RX ADMIN — DEFIBROTIDE SODIUM 406 MG: 80 INJECTION, SOLUTION INTRAVENOUS at 15:02

## 2018-10-29 RX ADMIN — DEFIBROTIDE SODIUM 406 MG: 80 INJECTION, SOLUTION INTRAVENOUS at 08:43

## 2018-10-29 RX ADMIN — DEXTROSE AND SODIUM CHLORIDE: 5; 450 INJECTION, SOLUTION INTRAVENOUS at 01:00

## 2018-10-29 RX ADMIN — PANTOPRAZOLE SODIUM 40 MG: 20 TABLET, DELAYED RELEASE ORAL at 08:43

## 2018-10-29 RX ADMIN — ACYCLOVIR SODIUM 700 MG: 50 INJECTION, SOLUTION INTRAVENOUS at 23:28

## 2018-10-29 RX ADMIN — LORAZEPAM 0.94 MG: 2 INJECTION INTRAMUSCULAR; INTRAVENOUS at 23:01

## 2018-10-29 RX ADMIN — URSODIOL 300 MG: 300 CAPSULE ORAL at 08:43

## 2018-10-29 RX ADMIN — GRANISETRON HYDROCHLORIDE 1 MG: 1 INJECTION INTRAVENOUS at 20:38

## 2018-10-29 RX ADMIN — URSODIOL 300 MG: 300 CAPSULE ORAL at 15:02

## 2018-10-29 RX ADMIN — OXYCODONE HYDROCHLORIDE 5 MG: 5 TABLET ORAL at 16:25

## 2018-10-29 RX ADMIN — DEFIBROTIDE SODIUM 406 MG: 80 INJECTION, SOLUTION INTRAVENOUS at 20:42

## 2018-10-29 RX ADMIN — OXYCODONE HYDROCHLORIDE 5 MG: 5 TABLET ORAL at 11:12

## 2018-10-29 RX ADMIN — MICAFUNGIN SODIUM 150 MG: 10 INJECTION, POWDER, LYOPHILIZED, FOR SOLUTION INTRAVENOUS at 10:51

## 2018-10-29 RX ADMIN — URSODIOL 300 MG: 300 CAPSULE ORAL at 20:42

## 2018-10-29 RX ADMIN — LORAZEPAM 0.94 MG: 2 INJECTION INTRAMUSCULAR; INTRAVENOUS at 11:21

## 2018-10-29 RX ADMIN — ACYCLOVIR SODIUM 700 MG: 50 INJECTION, SOLUTION INTRAVENOUS at 08:43

## 2018-10-29 RX ADMIN — GRANISETRON HYDROCHLORIDE 1 MG: 1 INJECTION INTRAVENOUS at 07:19

## 2018-10-29 NOTE — PROGRESS NOTES
Pediatric BMT Daily Progress Note    Interval Events:  Artemio had no acute interval events.  He has had some nausea after TBI this morning.    Review of Systems: Pertinent positives include those mentioned in interval events. A complete review of systems was performed and is otherwise negative.      Medications:  Please see MAR    Physical Exam:  Temp:  [96.7  F (35.9  C)-98.7  F (37.1  C)] 96.7  F (35.9  C)  Heart Rate:  [55-73] 60  Resp:  [16-18] 18  BP: ()/(46-68) 96/54  Cuff Mean (mmHg):  [56-79] 69  SpO2:  [98 %-99 %] 99 %  I/O last 3 completed shifts:  In: 280 [P.O.:280]  Out: 450 [Urine:450]  GEN: Lying in bed, appears comfortable, interactive.  NAD  HEENT: Normocephalic, atraumatic, alopecia, sclera anicteric, non-injected, MMM, nares patent without discharge  CARD: Heart RRR without murmurs or extra heart sounds.  Cap refill 2.5 seconds.  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 He is currently BMT Transplant Date: BMT Txp 11/2/2018 (-4 days).  He is doing well clinically at this time.      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  Conditioning per 2015-29: TBI days -4 through -1 followed by matched sibling donor transplant on 11/2   - starts TBI BID today      # Risk for GVHD:   - Post-transplant Cytoxan days +3 and +4  - Tacrolimus and MMF to start day +5      FEN/Renal:  # Risk for malnutrition: no history of TPN or enteral nutrition dependence   - Dietician consult upon admission, to follow   - Continue age appropriate diet       # Risk for electrolyte  abnormalities:  - check daily electrolytes       # Risk for renal dysfunction and fluid overload:  - monitor I/O's and daily weights upon admission       # Risk for aHUS/TA-TMA: surveillance to initiate upon admission   - monitor LDH qMonday  - monitor urine protein/creatinine qTuesday      Pulmonary:  # Risk for pulmonary toxicity secondary to chemotherapy:   - PFTs scheduled for 10/24      # Rhinorrhea: 3 mos history, mild. Sinus CT (10/22) with findings of mucosal thickening without evidence of active infection  - RVP 10/22 resulted negative      # Pneumomediastinum: finding upon work up Chest CT (10/22), asymptomatic. Per radiology, likely benign, perhaps transient.   - Patient instructed to notify care team with any SOB, dyspnea, or remarkable cough      Cardiovascular:  # Aaron-Parkinson-White Syndrome: diagnosed upon syncope in 02/2018. Asymptomatic, no interventions to date. Work up EKG c/w WPW, sinus bradycardia at 49 bpm. Work up ECHO normal with EF 68%.   - Cardiology visit with Dr. Plummer (10/22): Holter monitor applied, results pending. Contacted Cardiology to obtain results prior to starting preparative regimen. Normal Echocardiogram with EF 68%.   - Of note, Isael's HR has been measured at high 30s while asleep during previous hospitalizations.          Heme:   # Pancytopenia secondary to chemotherapy: hx of one platelet transfusion, well tolerated  - Transfuse for hemoglobin < 8, platelets < 30,000 (Defibrotide)  - No previous premedications, monitor for need  - GCSF to start day +5 until ANC >1500 x3      Infectious Disease:  # Risk for infection given immunocompromised status: IDMs pending  Active: 3 mos hx of mild rhinorrhea. Chest/sinus CT without evidence of infection. 10/22 RVP negative.      Prophylaxis:                                                                                                                        - viral prophylaxis: CMV neg (donor CMV positive), HSV positive.  Acyclovir starts today, Day -4   - fungal prophylaxis: high risk- Micafungin    - bacterial prophylaxis: Levofloxacin to start Day -1      Past infections: none significant       GI:   # Nausea management:   - Scheduled medications: zofran gtt to initiate upon TBI  - PRN medications: consider ativan and benadryl       # Risk for VOD: especially high risk given previous Inotuzumab therapy.   - Ursodiol TID (10 ml/kg)  - Defibrotide ppx      Neuro:  # Mucositis/pain (anticipated):   - Oxycodone PRN  - Integrative therapy       The above plan of care was developed by and communicated to me by the   Pediatric BMT attending physician, Dr. Viky Zavala.      Candido Morales,   Pediatric BMT Hospitalist      BMT Attending Note:     Artemio was seen and evaluated by me today.      The significant interval history includes TBI this am and vomited after.  He received ativan.       I have reviewed changes and data from the last 24 hours, including medications, laboratory results, vital signs, radiograph results, consultant recommendations and pathology results.      I have formulated and discussed the plan with the BMT team. I discussed the course and plan with the patient/family and answered all of their questions to the best of my ability. I counseled them regarding the followin yo with VHR B cell ALL here for matched sibling transplant with TBI and post transplant cytoxan, at risk for nausea and vomiting- zofran drip and ativan PRN, at risk for VOD- defibrotide and ursodiol, at risk for malnutrition- will follow closely, at risk of opportunistic infection- prophylaxis with levo, acyclovir and hannah, history of WPW- will discuss with cardiology.  He will continue TBI through Thursday.       My care coordination activities today include oversight of planned lab studies, oversight of planned radiographic studies, oversight of medication changes, discussion with BMT team-members and discussion with  consultants.     My total floor time today was at least 35 minutes, greater than 50% of which was counseling and coordination of care.        Viky Zavala MD, PhD    Pediatric Blood and Marrow Transplant  [facility]      Patient Active Problem List   Diagnosis     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)     Aaron-Parkinson-White (WPW) syndrome     Bone marrow transplant candidate     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)     At risk for infection

## 2018-10-29 NOTE — PLAN OF CARE
Problem: Patient Care Overview  Goal: Plan of Care/Patient Progress Review  Outcome: No Change  AVSS. Lung sounds clear. No reports of pain or nausea. Maintenance running TKO on red port, purple port hep locked. Good UO. TBI to be started this morning. No family present. Hourly rounding completed. Continue to follow POC and update MD with any changes.

## 2018-10-29 NOTE — PLAN OF CARE
Problem: Patient Care Overview  Goal: Plan of Care/Patient Progress Review  Outcome: Declining  Afebrile. OVSS lungs clear. TBIx2.  Complaining of abdominal pain, oxy x2 with some relief.  2 large stools.  Emesis x2, ativan x1 given .  Hourly rounding done. POC follwed.

## 2018-10-29 NOTE — MR AVS SNAPSHOT
After Visit Summary   10/29/2018    Artemio Nino    MRN: [id number]           Patient Information     Date Of Birth          2000        Visit Information        Provider Department      10/29/2018 10:00 PM Ester Early MD Rad[facility]         Follow-ups after your visit        Your next 10 appointments already scheduled     Oct 30, 2018  8:00 AM CDT   EXTERNAL RADIATION TREATMENT with [facility]   [facility] ([facility])    [address] [id number] [phone]            Oct 30, 2018  2:00 PM CDT   EXTERNAL RADIATION TREATMENT with [facility]   [facility] ([facility])    [address] [id number] [phone]            Oct 31, 2018  8:00 AM CDT   EXTERNAL RADIATION TREATMENT with [facility]   [facility] ([facility])    [address] [id number] [phone]            Oct 31, 2018  2:00 PM CDT   EXTERNAL RADIATION TREATMENT with [facility]   [facility] ([facility])    [address] [id number] [phone]            Nov 01, 2018  8:00 AM CDT   EXTERNAL RADIATION TREATMENT with [facility]   [facility] ([facility])    [address] [id number] [phone]            Nov 01, 2018  2:00 PM CDT   EXTERNAL RADIATION TREATMENT with [facility]   [facility] ([facility])    [address] [id number] [phone]              Who to contact     Please call your clinic at [phone] to:    Ask questions about your health    Make or cancel appointments    Discuss your medicines    Learn about your test results    Speak to  your doctor            Additional Information About Your Visit        BabyWatch[employer] Information     Postling is an electronic gateway that provides easy, online access to your medical records. With Postling, you can request a clinic appointment, read your test results, renew a prescription or communicate with your care team.     To sign up for Postling visit the website at [email or web address]   You will be asked to enter the access code listed below, as well as some personal information. Please follow the directions to create your username and password.     Your access code is: [email or web address]  Expires: 2019 10:57 AM     Your access code will  in 90 days. If you need help or a new code, please contact your [facility] or call [phone] for assistance.        Care EveryWhere ID     This is your Care EveryWhere ID. This could be used by other organizations to access your [facility] medical records  [id number]         Blood Pressure from Last 3 Encounters:   No data found for BP    Weight from Last 3 Encounters:   No data found for Wt              Today, you had the following     No orders found for display         Today's Medication Changes      Notice     This visit is during an admission. Changes to the med list made in this visit will be reflected in the After Visit Summary of the admission.             Primary Care Provider Office Phone # Fax #    Viky Zavala [phone] [address] [facility] [address]        Equal Access to Services     [facility] Hospital[facility]EL[facility] : Hadii errol palacios hadasho Soshady, waaxda luqadaha, qaybta kaalmada adeegyada, jessy palm . So [facility] [phone].    ATENCIÓN: Si habla español, tiene a london disposición servicios gratuitos de asistencia lingüística. Llame al [phone].    We comply with applicable federal civil rights laws and [place] laws. We do not discriminate on the basis of race,  color, national origin, age, disability, sex, sexual orientation, or gender identity.            Thank you!     Thank you for choosing [facility]  for your care. Our goal is always to provide you with excellent care. Hearing back from our patients is one way we can continue to improve our services. Please take a few minutes to complete the written survey that you may receive in the mail after your visit with us. Thank you!             Your Updated Medication List - Protect others around you: Learn how to safely use, store and throw away your medicines at www.disposemymeds.org.      Notice     This visit is during an admission. Changes to the med list made in this visit will be reflected in the After Visit Summary of the admission.

## 2018-10-30 ENCOUNTER — APPOINTMENT (OUTPATIENT)
Dept: RADIATION ONCOLOGY | Facility: CLINIC | Age: 18
End: 2018-10-30
Attending: RADIOLOGY
Payer: COMMERCIAL

## 2018-10-30 LAB
ALBUMIN UR-MCNC: NEGATIVE MG/DL
ANION GAP SERPL CALCULATED.3IONS-SCNC: 7 MMOL/L (ref 3–14)
APPEARANCE UR: CLEAR
BASOPHILS # BLD AUTO: 0 10E9/L (ref 0–0.2)
BASOPHILS NFR BLD AUTO: 0.2 %
BILIRUB UR QL STRIP: NEGATIVE
BUN SERPL-MCNC: 7 MG/DL (ref 7–30)
CALCIUM SERPL-MCNC: 8.2 MG/DL (ref 8.5–10.1)
CHLORIDE SERPL-SCNC: 105 MMOL/L (ref 94–109)
CO2 SERPL-SCNC: 28 MMOL/L (ref 20–32)
COLOR UR AUTO: NORMAL
CREAT SERPL-MCNC: 0.82 MG/DL (ref 0.66–1.25)
CREAT UR-MCNC: 41 MG/DL
DIFFERENTIAL METHOD BLD: ABNORMAL
EOSINOPHIL # BLD AUTO: 0 10E9/L (ref 0–0.7)
EOSINOPHIL NFR BLD AUTO: 0.5 %
ERYTHROCYTE [DISTWIDTH] IN BLOOD BY AUTOMATED COUNT: 15.8 % (ref 10–15)
GFR SERPL CREATININE-BSD FRML MDRD: >90 ML/MIN/1.7M2
GLUCOSE SERPL-MCNC: 119 MG/DL (ref 70–99)
GLUCOSE UR STRIP-MCNC: NEGATIVE MG/DL
HCT VFR BLD AUTO: 25.3 % (ref 40–53)
HGB BLD-MCNC: 8.4 G/DL (ref 13.3–17.7)
HGB UR QL STRIP: NEGATIVE
IMM GRANULOCYTES # BLD: 0.1 10E9/L (ref 0–0.4)
IMM GRANULOCYTES NFR BLD: 1 %
KETONES UR STRIP-MCNC: NEGATIVE MG/DL
LEUKOCYTE ESTERASE UR QL STRIP: NEGATIVE
LYMPHOCYTES # BLD AUTO: 0.1 10E9/L (ref 0.8–5.3)
LYMPHOCYTES NFR BLD AUTO: 1.4 %
MCH RBC QN AUTO: 28.8 PG (ref 26.5–33)
MCHC RBC AUTO-ENTMCNC: 33.2 G/DL (ref 31.5–36.5)
MCV RBC AUTO: 87 FL (ref 78–100)
MONOCYTES # BLD AUTO: 1 10E9/L (ref 0–1.3)
MONOCYTES NFR BLD AUTO: 17.2 %
NEUTROPHILS # BLD AUTO: 4.6 10E9/L (ref 1.6–8.3)
NEUTROPHILS NFR BLD AUTO: 79.7 %
NITRATE UR QL: NEGATIVE
NRBC # BLD AUTO: 0 10*3/UL
NRBC BLD AUTO-RTO: 1 /100
PH UR STRIP: 5.5 PH (ref 5–7)
PLATELET # BLD AUTO: 152 10E9/L (ref 150–450)
POTASSIUM SERPL-SCNC: 3.3 MMOL/L (ref 3.4–5.3)
PROT UR-MCNC: <0.05 G/L
PROT/CREAT 24H UR: NORMAL G/G CR (ref 0–0.2)
RBC # BLD AUTO: 2.92 10E12/L (ref 4.4–5.9)
RBC #/AREA URNS AUTO: 0 /HPF (ref 0–2)
SODIUM SERPL-SCNC: 140 MMOL/L (ref 133–144)
SOURCE: NORMAL
SP GR UR STRIP: 1 (ref 1–1.03)
UROBILINOGEN UR STRIP-MCNC: NORMAL MG/DL (ref 0–2)
WBC # BLD AUTO: 5.8 10E9/L (ref 4–11)
WBC #/AREA URNS AUTO: 1 /HPF (ref 0–5)

## 2018-10-30 PROCEDURE — 85025 COMPLETE CBC W/AUTO DIFF WBC: CPT | Performed by: NURSE PRACTITIONER

## 2018-10-30 PROCEDURE — 77280 THER RAD SIMULAJ FIELD SMPL: CPT | Performed by: RADIOLOGY

## 2018-10-30 PROCEDURE — 25000128 H RX IP 250 OP 636: Performed by: PEDIATRICS

## 2018-10-30 PROCEDURE — 25000128 H RX IP 250 OP 636: Performed by: NURSE PRACTITIONER

## 2018-10-30 PROCEDURE — 87102 FUNGUS ISOLATION CULTURE: CPT | Performed by: NURSE PRACTITIONER

## 2018-10-30 PROCEDURE — C9399 UNCLASSIFIED DRUGS OR BIOLOG: HCPCS | Performed by: PEDIATRICS

## 2018-10-30 PROCEDURE — 80048 BASIC METABOLIC PNL TOTAL CA: CPT | Performed by: NURSE PRACTITIONER

## 2018-10-30 PROCEDURE — 20000002 ZZH R&B BMT INTERMEDIATE

## 2018-10-30 PROCEDURE — 84156 ASSAY OF PROTEIN URINE: CPT | Performed by: NURSE PRACTITIONER

## 2018-10-30 PROCEDURE — 77412 RADIATION TX DELIVERY LVL 3: CPT | Performed by: RADIOLOGY

## 2018-10-30 PROCEDURE — 25000132 ZZH RX MED GY IP 250 OP 250 PS 637: Performed by: NURSE PRACTITIONER

## 2018-10-30 PROCEDURE — 81001 URINALYSIS AUTO W/SCOPE: CPT | Performed by: NURSE PRACTITIONER

## 2018-10-30 PROCEDURE — 77412 RADIATION TX DELIVERY LVL 3: CPT | Mod: XE | Performed by: RADIOLOGY

## 2018-10-30 RX ORDER — HEPARIN SODIUM,PORCINE 10 UNIT/ML
2-4 VIAL (ML) INTRAVENOUS EVERY 24 HOURS
Status: DISCONTINUED | OUTPATIENT
Start: 2018-10-30 | End: 2018-11-27 | Stop reason: HOSPADM

## 2018-10-30 RX ORDER — HEPARIN SODIUM,PORCINE 10 UNIT/ML
2-4 VIAL (ML) INTRAVENOUS
Status: DISCONTINUED | OUTPATIENT
Start: 2018-10-30 | End: 2018-11-27 | Stop reason: HOSPADM

## 2018-10-30 RX ORDER — DRONABINOL 2.5 MG/1
2.5 CAPSULE ORAL 2 TIMES DAILY PRN
Status: DISCONTINUED | OUTPATIENT
Start: 2018-10-30 | End: 2018-11-11

## 2018-10-30 RX ORDER — DIPHENHYDRAMINE HCL 25 MG
25 CAPSULE ORAL EVERY 6 HOURS PRN
Status: DISCONTINUED | OUTPATIENT
Start: 2018-10-30 | End: 2018-11-13

## 2018-10-30 RX ADMIN — ACYCLOVIR SODIUM 700 MG: 50 INJECTION, SOLUTION INTRAVENOUS at 15:24

## 2018-10-30 RX ADMIN — SODIUM CHLORIDE, PRESERVATIVE FREE 2 ML: 5 INJECTION INTRAVENOUS at 13:51

## 2018-10-30 RX ADMIN — URSODIOL 300 MG: 300 CAPSULE ORAL at 20:37

## 2018-10-30 RX ADMIN — ACYCLOVIR SODIUM 700 MG: 50 INJECTION, SOLUTION INTRAVENOUS at 08:11

## 2018-10-30 RX ADMIN — LORAZEPAM 0.94 MG: 2 INJECTION INTRAMUSCULAR; INTRAVENOUS at 18:02

## 2018-10-30 RX ADMIN — ACETAMINOPHEN 650 MG: 325 TABLET, FILM COATED ORAL at 18:10

## 2018-10-30 RX ADMIN — URSODIOL 300 MG: 300 CAPSULE ORAL at 13:51

## 2018-10-30 RX ADMIN — ACYCLOVIR SODIUM 700 MG: 50 INJECTION, SOLUTION INTRAVENOUS at 23:47

## 2018-10-30 RX ADMIN — DEFIBROTIDE SODIUM 406 MG: 80 INJECTION, SOLUTION INTRAVENOUS at 20:37

## 2018-10-30 RX ADMIN — GRANISETRON HYDROCHLORIDE 1 MG: 1 INJECTION INTRAVENOUS at 20:37

## 2018-10-30 RX ADMIN — LORAZEPAM 0.94 MG: 2 INJECTION INTRAMUSCULAR; INTRAVENOUS at 09:02

## 2018-10-30 RX ADMIN — DEFIBROTIDE SODIUM 406 MG: 80 INJECTION, SOLUTION INTRAVENOUS at 02:25

## 2018-10-30 RX ADMIN — URSODIOL 300 MG: 300 CAPSULE ORAL at 08:11

## 2018-10-30 RX ADMIN — DEFIBROTIDE SODIUM 406 MG: 80 INJECTION, SOLUTION INTRAVENOUS at 08:11

## 2018-10-30 RX ADMIN — GRANISETRON HYDROCHLORIDE 1 MG: 1 INJECTION INTRAVENOUS at 07:20

## 2018-10-30 RX ADMIN — DEFIBROTIDE SODIUM 406 MG: 80 INJECTION, SOLUTION INTRAVENOUS at 15:24

## 2018-10-30 RX ADMIN — MICAFUNGIN SODIUM 150 MG: 10 INJECTION, POWDER, LYOPHILIZED, FOR SOLUTION INTRAVENOUS at 11:10

## 2018-10-30 RX ADMIN — PANTOPRAZOLE SODIUM 40 MG: 20 TABLET, DELAYED RELEASE ORAL at 08:11

## 2018-10-30 NOTE — PLAN OF CARE
Problem: Stem Cell/Bone Marrow Transplant (Adult)  Goal: Signs and Symptoms of Listed Potential Problems Will be Absent, Minimized or Managed (Stem Cell/Bone Marrow Transplant)  Signs and symptoms of listed potential problems will be absent, minimized or managed by discharge/transition of care (reference Stem Cell/Bone Marrow Transplant (Adult) CPG).   Outcome: No Change  Afebrile. VSS on RA. LS clear. Emesis x1, PRN Ativan with some relief. Good UOP. No replacements needed. Patient slept well overnight. Hourly rounding complete. Continue with plan of care.

## 2018-10-30 NOTE — PLAN OF CARE
Problem: Patient Care Overview  Goal: Plan of Care/Patient Progress Review  Outcome: No Change  ([id number]).  Afebrile.  Lungs clear sating good on RA.  BPs 90-100s/50-60s, OVSS.  Pt intermittently nauseous throughout the day, emesis x1.  PRN ativan given this morning with some relief and x1 at end of shift.  Drinking some water PO but eating very little.  Pt also complained of a headache at end of shift, PRN tylenol given x1.  No stool during shift.  Urinating.  Hourly rounding completed.  No family currently present at bedside.  Continue with POC.

## 2018-10-30 NOTE — PROCEDURES
Radiation Oncology:  [facility] [address] [place] [address]        GLEN HAMMER EDWINA  [facility] Rec #: [id number]  Diagnosis: C91.01 Acute lymphoblastic leukemia, in remission     Total Body Irradiation Initial OTV Physician Note  October 29, 2018             Treatment Summary:  Radiation Oncology - Course: 1 Protocol: 2015-29  Treatment Site Current Dose Modality From To Elapsed Days Fx.  1 TBI     165 cGy 18X  10/29/2018 10/29/2018   1                Under my direction the first TBI fraction was delivered after the set up parameters were   checked in the treatment position in the treatment room.  The chart and set up information were   reviewed.  The patient's questions were answered.     Objective:    Patient appeared relaxed and ready for TBI.  Nausea well controlled  Assessment:     Ready for TBI.    Plan:         Proceed with TBI      I have checked the following parameters prior to the first treatment:     Patient Identification  Protocol  SSD, Correct placement of the field, correct body position  Prescription thickness  Compensator and beam spoiler placement  Dose prescription, dose rate and energy  OSL's were ordered for verification during the treatment     Ester Early MD

## 2018-10-30 NOTE — PROGRESS NOTES
[facility]   Heart Center Results Communication Note    Called and relayed ZioPatch results to BMT service.  ZioPatch reviewed with Dr. Plummer.  10 isolated premature isolated contractions, no AV block.  At higher heart rates unable to determine if pre-excited due to baseline artifact.  Avoid electrolyte imbalances (electrolyte parameters: magnesium > 1.8, potassium > 3.5, normal ionized calcium level).      Eric Gonzales DO  Pediatric Cardiology Fellow  10/30/2018 12:12 PM  Pager:  [phone]

## 2018-10-30 NOTE — PROGRESS NOTES
Pediatric BMT Daily Progress Note    Interval Events:  Artemio had no acute interval events.  He continues TBI.  He experienced one episode of nausea yesterday, but was otherwise feeling okay with some generalized fatigue.  He was more nauseated this morning and vomited twice while at TBI.  He reports his nausea has since subsided some and is not currently interested in any additional scheduled anti-emetics.    Review of Systems: Pertinent positives include those mentioned in interval events. A complete review of systems was performed and is otherwise negative.      Medications:  Please see MAR    Physical Exam:  Temp:  [96.7  F (35.9  C)-98.5  F (36.9  C)] 98.1  F (36.7  C)  Heart Rate:  [60-90] 90  Resp:  [16-18] 16  BP: ()/(45-67) 88/58  Cuff Mean (mmHg):  [69] 69  SpO2:  [97 %-100 %] 97 %  I/O last 3 completed shifts:  In: 1054.2 [I.V.:1054.2]  Out: 1875 [Urine:1225; Emesis/NG output:650]  GEN: Lying in bed, appears comfortable, interactive.  NAD.  HEENT: Normocephalic, atraumatic, alopecia, sclera anicteric, non-injected, MMM, nares patent without discharge  CARD: Heart RRR without murmurs or extra heart sounds.  Cap refill 2 seconds.  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 He is currently BMT Transplant Date: BMT Txp 11/2/2018 (-3 days).  He is doing well clinically at this time.  Some nausea with TBI, but not currently interested in changing anti-emetic regimen.      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  Conditioning per 2015-29: TBI days -4 through -1 followed  by matched sibling donor transplant on 11/2   - continues TBI BID today      # Risk for GVHD:   - Post-transplant Cytoxan days +3 and +4  - Tacrolimus and MMF to start day +5      FEN/Renal:  # Risk for malnutrition: no history of TPN or enteral nutrition dependence   - Dietician consult upon admission, to follow   - Continue age appropriate diet       # Risk for electrolyte abnormalities:  - check daily electrolytes       # Risk for renal dysfunction and fluid overload:  - monitor I/O's and daily weights upon admission       # Risk for aHUS/TA-TMA: surveillance to initiate upon admission   - monitor LDH qMonday  - monitor urine protein/creatinine qTuesday      Pulmonary:  # Risk for pulmonary toxicity secondary to chemotherapy:   - PFTs scheduled for 10/24      # Rhinorrhea: 3 mos history, mild. Sinus CT (10/22) with findings of mucosal thickening without evidence of active infection  - RVP 10/22 resulted negative      # Pneumomediastinum: finding upon work up Chest CT (10/22), asymptomatic. Per radiology, likely benign, perhaps transient.   - Patient instructed to notify care team with any SOB, dyspnea, or remarkable cough      Cardiovascular:  # Aaron-Parkinson-White Syndrome: diagnosed upon syncope in 02/2018. Asymptomatic, no interventions to date. Work up EKG c/w WPW, sinus bradycardia at 49 bpm. Work up ECHO normal with EF 68%.   - Cardiology visit with Dr. Plummer (10/22): Holter revealed 10 PACs with no evidence of AV block. Normal Echocardiogram with EF 68%.   - Of note, Isael's HR has been measured at high 30s while asleep during previous hospitalizations.   - Avoid catecholinergics as able to avoid tachycardia  - Maintain normal iCa+, mag > 1.8, K > 3.5         Heme:   # Pancytopenia secondary to chemotherapy: hx of one platelet transfusion, well tolerated  - Transfuse for hemoglobin < 8, platelets < 30,000 (Defibrotide)  - No previous premedications, monitor for need  - GCSF to start day +5 until ANC  >1500 x3      Infectious Disease:  # Risk for infection given immunocompromised status: IDMs pending  Active: 3 mos hx of mild rhinorrhea. Chest/sinus CT without evidence of infection. 10/22 RVP negative.      Prophylaxis:                                                                                                                        - viral prophylaxis: CMV neg (donor CMV positive), HSV positive. Acyclovir  - fungal prophylaxis: high risk- Micafungin    - bacterial prophylaxis: Levofloxacin to start Day -1      Past infections: none significant       GI:   # Nausea management:   - Scheduled medications: Kytril Q12H  - PRN medications: Ativan, Benadryl, marinol      # Risk for VOD: especially high risk given previous Inotuzumab therapy.   - Ursodiol TID (10 ml/kg)  - Defibrotide ppx      Neuro:  # Mucositis/pain (anticipated):   - Oxycodone PRN  - Integrative therapy       The above plan of care was developed by and communicated to me by the   Pediatric BMT attending physician, Dr. Viky Zavala.      Candido Morales,   Pediatric BMT Hospitalist      BMT Attending Note:     Artemio was seen and evaluated by me today.      The significant interval history includes tolerated TBI.  Did not drink anything today.  He still vomited after TBI but did not want to schedule anything additionally and just have PRN anti-nausea meds.      I have reviewed changes and data from the last 24 hours, including medications, laboratory results, vital signs, radiograph results, consultant recommendations and pathology results.      I have formulated and discussed the plan with the BMT team. I discussed the course and plan with the patient/family and answered all of their questions to the best of my ability. I counseled them regarding the followin yo with VHR B cell ALL here for matched sibling transplant with TBI and post transplant cytoxan, at risk for nausea and vomiting- kytril and ativan PRN, at risk for VOD-  defibrotide and ursodiol, at risk for malnutrition- will follow closely, at risk of opportunistic infection- prophylaxis with levo, acyclovir and hannah, history of WPW- keep electrolytes normal and avoid catecholinergics.  He will continue TBI through Thursday.       My care coordination activities today include oversight of planned lab studies, oversight of planned radiographic studies, oversight of medication changes, discussion with BMT team-members and discussion with consultants.     My total floor time today was at least 35 minutes, greater than 50% of which was counseling and coordination of care.        Viky Zavala MD, PhD    Pediatric Blood and Marrow Transplant  [facility]          Patient Active Problem List   Diagnosis     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)     Aaron-Parkinson-White (WPW) syndrome     Bone marrow transplant candidate     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)     At risk for infection

## 2018-10-30 NOTE — PLAN OF CARE
Problem: Patient Care Overview  Goal: Plan of Care/Patient Progress Review  PT Unit 4: Cancel PT, pt at TBI this AM, chatted with pt briefly this afternoon before pt returns for another round of TBI. Pt expressed interest in PT following to provide exercise and education but was falling asleep asking PT to return at another time. Plan is to evaluate pt tomorrow 10/31.    Amanda Watson, PT, [phone]

## 2018-10-31 ENCOUNTER — APPOINTMENT (OUTPATIENT)
Dept: RADIATION ONCOLOGY | Facility: CLINIC | Age: 18
End: 2018-10-31
Attending: RADIOLOGY
Payer: COMMERCIAL

## 2018-10-31 LAB
ANION GAP SERPL CALCULATED.3IONS-SCNC: 6 MMOL/L (ref 3–14)
BACTERIA SPEC CULT: NORMAL
BASOPHILS # BLD AUTO: 0 10E9/L (ref 0–0.2)
BASOPHILS NFR BLD AUTO: 0.2 %
BUN SERPL-MCNC: 5 MG/DL (ref 7–30)
CA-I BLD-MCNC: 4.6 MG/DL (ref 4.4–5.2)
CALCIUM SERPL-MCNC: 7.8 MG/DL (ref 8.5–10.1)
CHLORIDE SERPL-SCNC: 107 MMOL/L (ref 94–109)
CO2 SERPL-SCNC: 27 MMOL/L (ref 20–32)
CREAT SERPL-MCNC: 0.84 MG/DL (ref 0.66–1.25)
DIFFERENTIAL METHOD BLD: ABNORMAL
EOSINOPHIL # BLD AUTO: 0 10E9/L (ref 0–0.7)
EOSINOPHIL NFR BLD AUTO: 0.9 %
ERYTHROCYTE [DISTWIDTH] IN BLOOD BY AUTOMATED COUNT: 15.8 % (ref 10–15)
GFR SERPL CREATININE-BSD FRML MDRD: >90 ML/MIN/1.7M2
GLUCOSE SERPL-MCNC: 102 MG/DL (ref 70–99)
HCT VFR BLD AUTO: 25.3 % (ref 40–53)
HGB BLD-MCNC: 8.5 G/DL (ref 13.3–17.7)
IMM GRANULOCYTES # BLD: 0 10E9/L (ref 0–0.4)
IMM GRANULOCYTES NFR BLD: 0.4 %
LYMPHOCYTES # BLD AUTO: 0.1 10E9/L (ref 0.8–5.3)
LYMPHOCYTES NFR BLD AUTO: 1.3 %
MAGNESIUM SERPL-MCNC: 1.8 MG/DL (ref 1.6–2.3)
MCH RBC QN AUTO: 29 PG (ref 26.5–33)
MCHC RBC AUTO-ENTMCNC: 33.6 G/DL (ref 31.5–36.5)
MCV RBC AUTO: 86 FL (ref 78–100)
MONOCYTES # BLD AUTO: 0.6 10E9/L (ref 0–1.3)
MONOCYTES NFR BLD AUTO: 13 %
NEUTROPHILS # BLD AUTO: 3.9 10E9/L (ref 1.6–8.3)
NEUTROPHILS NFR BLD AUTO: 84.2 %
NRBC # BLD AUTO: 0 10*3/UL
NRBC BLD AUTO-RTO: 0 /100
PLATELET # BLD AUTO: 142 10E9/L (ref 150–450)
POTASSIUM SERPL-SCNC: 3.2 MMOL/L (ref 3.4–5.3)
POTASSIUM SERPL-SCNC: 3.3 MMOL/L (ref 3.4–5.3)
POTASSIUM SERPL-SCNC: 3.4 MMOL/L (ref 3.4–5.3)
RBC # BLD AUTO: 2.93 10E12/L (ref 4.4–5.9)
SODIUM SERPL-SCNC: 140 MMOL/L (ref 133–144)
SPECIMEN SOURCE: NORMAL
WBC # BLD AUTO: 4.6 10E9/L (ref 4–11)

## 2018-10-31 PROCEDURE — C9399 UNCLASSIFIED DRUGS OR BIOLOG: HCPCS | Performed by: PEDIATRICS

## 2018-10-31 PROCEDURE — 25000132 ZZH RX MED GY IP 250 OP 250 PS 637: Performed by: PEDIATRICS

## 2018-10-31 PROCEDURE — 25000128 H RX IP 250 OP 636: Performed by: PEDIATRICS

## 2018-10-31 PROCEDURE — 87040 BLOOD CULTURE FOR BACTERIA: CPT | Performed by: NURSE PRACTITIONER

## 2018-10-31 PROCEDURE — 85018 HEMOGLOBIN: CPT | Performed by: NURSE PRACTITIONER

## 2018-10-31 PROCEDURE — 77412 RADIATION TX DELIVERY LVL 3: CPT | Mod: XE | Performed by: RADIOLOGY

## 2018-10-31 PROCEDURE — 84132 ASSAY OF SERUM POTASSIUM: CPT | Performed by: NURSE PRACTITIONER

## 2018-10-31 PROCEDURE — 25000132 ZZH RX MED GY IP 250 OP 250 PS 637: Performed by: NURSE PRACTITIONER

## 2018-10-31 PROCEDURE — 77412 RADIATION TX DELIVERY LVL 3: CPT | Performed by: RADIOLOGY

## 2018-10-31 PROCEDURE — 25000128 H RX IP 250 OP 636: Performed by: NURSE PRACTITIONER

## 2018-10-31 PROCEDURE — 85025 COMPLETE CBC W/AUTO DIFF WBC: CPT | Performed by: NURSE PRACTITIONER

## 2018-10-31 PROCEDURE — 87103 BLOOD FUNGUS CULTURE: CPT | Performed by: NURSE PRACTITIONER

## 2018-10-31 PROCEDURE — 25800025 ZZH RX 258: Performed by: PEDIATRICS

## 2018-10-31 PROCEDURE — 80048 BASIC METABOLIC PNL TOTAL CA: CPT | Performed by: NURSE PRACTITIONER

## 2018-10-31 PROCEDURE — 77336 RADIATION PHYSICS CONSULT: CPT | Performed by: RADIOLOGY

## 2018-10-31 PROCEDURE — 83735 ASSAY OF MAGNESIUM: CPT | Performed by: NURSE PRACTITIONER

## 2018-10-31 PROCEDURE — 20000002 ZZH R&B BMT INTERMEDIATE

## 2018-10-31 PROCEDURE — 82330 ASSAY OF CALCIUM: CPT | Performed by: PEDIATRICS

## 2018-10-31 RX ORDER — CEFEPIME HYDROCHLORIDE 1 G/1
1 INJECTION, POWDER, FOR SOLUTION INTRAMUSCULAR; INTRAVENOUS EVERY 12 HOURS
Status: DISCONTINUED | OUTPATIENT
Start: 2018-10-31 | End: 2018-10-31

## 2018-10-31 RX ORDER — PROMETHAZINE HYDROCHLORIDE 25 MG/ML
25 INJECTION, SOLUTION INTRAMUSCULAR; INTRAVENOUS EVERY 6 HOURS PRN
Status: DISCONTINUED | OUTPATIENT
Start: 2018-10-31 | End: 2018-10-31

## 2018-10-31 RX ORDER — MORPHINE SULFATE 2 MG/ML
2-4 INJECTION, SOLUTION INTRAMUSCULAR; INTRAVENOUS
Status: DISCONTINUED | OUTPATIENT
Start: 2018-10-31 | End: 2018-11-09

## 2018-10-31 RX ORDER — PROMETHAZINE HYDROCHLORIDE 25 MG/ML
25-50 INJECTION, SOLUTION INTRAMUSCULAR; INTRAVENOUS EVERY 6 HOURS PRN
Status: DISCONTINUED | OUTPATIENT
Start: 2018-10-31 | End: 2018-10-31

## 2018-10-31 RX ADMIN — DEFIBROTIDE SODIUM 406 MG: 80 INJECTION, SOLUTION INTRAVENOUS at 21:01

## 2018-10-31 RX ADMIN — Medication 2 G: at 22:10

## 2018-10-31 RX ADMIN — URSODIOL 300 MG: 300 CAPSULE ORAL at 21:06

## 2018-10-31 RX ADMIN — ACYCLOVIR SODIUM 700 MG: 50 INJECTION, SOLUTION INTRAVENOUS at 15:08

## 2018-10-31 RX ADMIN — DEFIBROTIDE SODIUM 406 MG: 80 INJECTION, SOLUTION INTRAVENOUS at 15:08

## 2018-10-31 RX ADMIN — GRANISETRON HYDROCHLORIDE 1 MG: 1 INJECTION INTRAVENOUS at 09:01

## 2018-10-31 RX ADMIN — ACYCLOVIR SODIUM 700 MG: 50 INJECTION, SOLUTION INTRAVENOUS at 23:05

## 2018-10-31 RX ADMIN — GRANISETRON HYDROCHLORIDE 1 MG: 1 INJECTION INTRAVENOUS at 21:01

## 2018-10-31 RX ADMIN — MICAFUNGIN SODIUM 150 MG: 10 INJECTION, POWDER, LYOPHILIZED, FOR SOLUTION INTRAVENOUS at 11:16

## 2018-10-31 RX ADMIN — OXYCODONE HYDROCHLORIDE 5 MG: 5 TABLET ORAL at 10:23

## 2018-10-31 RX ADMIN — Medication 25 MG: at 08:59

## 2018-10-31 RX ADMIN — ACYCLOVIR SODIUM 700 MG: 50 INJECTION, SOLUTION INTRAVENOUS at 08:59

## 2018-10-31 RX ADMIN — PANTOPRAZOLE SODIUM 40 MG: 20 TABLET, DELAYED RELEASE ORAL at 21:07

## 2018-10-31 RX ADMIN — DEFIBROTIDE SODIUM 406 MG: 80 INJECTION, SOLUTION INTRAVENOUS at 03:10

## 2018-10-31 RX ADMIN — DEXTROSE AND SODIUM CHLORIDE: 5; 450 INJECTION, SOLUTION INTRAVENOUS at 12:41

## 2018-10-31 RX ADMIN — SODIUM CHLORIDE, PRESERVATIVE FREE 2 ML: 5 INJECTION INTRAVENOUS at 07:10

## 2018-10-31 RX ADMIN — URSODIOL 300 MG: 300 CAPSULE ORAL at 10:23

## 2018-10-31 RX ADMIN — SODIUM CHLORIDE, PRESERVATIVE FREE 2 ML: 5 INJECTION INTRAVENOUS at 15:08

## 2018-10-31 RX ADMIN — LEVOFLOXACIN 750 MG: 5 INJECTION, SOLUTION INTRAVENOUS at 23:05

## 2018-10-31 RX ADMIN — Medication 25 MG: at 22:10

## 2018-10-31 RX ADMIN — URSODIOL 300 MG: 300 CAPSULE ORAL at 16:00

## 2018-10-31 RX ADMIN — POTASSIUM CHLORIDE 10 MEQ: 29.8 INJECTION, SOLUTION INTRAVENOUS at 11:15

## 2018-10-31 RX ADMIN — LORAZEPAM 0.94 MG: 2 INJECTION INTRAMUSCULAR; INTRAVENOUS at 10:23

## 2018-10-31 RX ADMIN — POTASSIUM CHLORIDE 10 MEQ: 29.8 INJECTION, SOLUTION INTRAVENOUS at 17:54

## 2018-10-31 RX ADMIN — Medication 25 MG: at 17:16

## 2018-10-31 RX ADMIN — POTASSIUM CHLORIDE 10 MEQ: 29.8 INJECTION, SOLUTION INTRAVENOUS at 23:06

## 2018-10-31 RX ADMIN — DEFIBROTIDE SODIUM 406 MG: 80 INJECTION, SOLUTION INTRAVENOUS at 09:07

## 2018-10-31 NOTE — PROGRESS NOTES
Pediatric BMT Daily Progress Note    Interval Events:  Artemio had no acute interval events.  He continues to have significant nausea related to TBI.  He received Ativan PRN with some relief, but was not interested in scheduling the dose overnight.    Review of Systems: Pertinent positives include those mentioned in interval events. A complete review of systems was performed and is otherwise negative.      Medications:  Please see MAR    Physical Exam:  Temp:  [97.9  F (36.6  C)-99  F (37.2  C)] 99  F (37.2  C)  Heart Rate:  [74-88] 79  Resp:  [16-18] 18  BP: ()/(45-60) 97/55  SpO2:  [95 %-98 %] 97 %  I/O last 3 completed shifts:  In: 2981.2 [P.O.:250; I.V.:2731.2]  Out: 1900 [Urine:1700; Emesis/NG output:200]  GEN: Lying in bed, resting, answers questions. NAD.  HEENT: Normocephalic, atraumatic, alopecia, sclera anicteric, non-injected, MMM, nares patent without discharge  CARD: Heart RRR without murmurs or extra heart sounds.  Cap refill 2 seconds.  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 He is currently BMT Transplant Date: BMT Txp 11/2/2018 (-2 days).  He is doing well clinically at this time.  He continues to have nausea with TBI with abdominal cramping.  Appetite is minimal.      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  Conditioning per 2015-29: TBI days -4 through -1 followed by matched sibling donor transplant on 11/2   - continues TBI BID today      # Risk for GVHD:   - Post-transplant Cytoxan days +3 and +4  - Tacrolimus and MMF to start day +5      FEN/Renal:  #  Risk for malnutrition: no history of TPN or enteral nutrition dependence   - Dietician consult upon admission, to follow   - Continue age appropriate diet   - mIVF D5 1/2 NS @ 100 mL/hr      # Risk for electrolyte abnormalities:  - check daily electrolytes       # Risk for renal dysfunction and fluid overload:  - monitor I/O's and daily weights upon admission       # Risk for aHUS/TA-TMA: surveillance to initiate upon admission   - monitor LDH qMonday  - monitor urine protein/creatinine qTuesday      Pulmonary:  # Risk for pulmonary toxicity secondary to chemotherapy:   - PFTs scheduled for 10/24      # Rhinorrhea: 3 mos history, mild. Sinus CT (10/22) with findings of mucosal thickening without evidence of active infection  - RVP 10/22 resulted negative      # Pneumomediastinum: finding upon work up Chest CT (10/22), asymptomatic. Per radiology, likely benign, perhaps transient.   - Patient instructed to notify care team with any SOB, dyspnea, or remarkable cough      Cardiovascular:  # Aaron-Parkinson-White Syndrome: diagnosed upon syncope in 02/2018. Asymptomatic, no interventions to date. Work up EKG c/w WPW, sinus bradycardia at 49 bpm. Work up ECHO normal with EF 68%.   Cardiology visit with Dr. Plummer (10/22): Holter revealed 10 PACs with no evidence of AV block. Normal Echocardiogram with EF 68%. Isael has hx of HR in high 30s with sleep.  - Avoid catecholinergics as able to avoid tachycardia  - Maintain normal iCa+, mag > 1.8, K > 3.5         Heme:   # Pancytopenia secondary to chemotherapy: hx of one platelet transfusion, well tolerated  - Transfuse for hemoglobin < 8, platelets < 30,000 (Defibrotide)  - No previous premedications, monitor for need  - GCSF to start day +5 until ANC >1500 x3      Infectious Disease:  # Risk for infection given immunocompromised status: IDMs pending  Active: 3 mos hx of mild rhinorrhea. Chest/sinus CT without evidence of infection. 10/22 RVP negative.      Prophylaxis:                                                                                                                         - viral prophylaxis: CMV neg (donor CMV positive), HSV positive. Acyclovir  - fungal prophylaxis: high risk- Micafungin    - bacterial prophylaxis: Levofloxacin to start Day -1, starts       Past infections: none significant       GI:   # Nausea management:   - Scheduled medications: Kytril Q12H, schedule Phenergan Q 6H  - PRN medications: Ativan, Benadryl, marinol      # Risk for VOD: especially high risk given previous Inotuzumab therapy.   - Ursodiol TID (10 ml/kg)  - Defibrotide ppx      Neuro:  # Mucositis/pain (anticipated):   - change oxycodone PRN to Morphine 2-4 mg Q 3H PRN  - Integrative therapy       The above plan of care was developed by and communicated to me by the   Pediatric BMT attending physician, Dr. Viky Zavala.      Candido Morales,   Pediatric BMT Hospitalist      BMT Attending Note:     Artemio was seen and evaluated by me today.      The significant interval history includes tolerated TBI.  Continues with nausea. Has some abdominal pain.     I have reviewed changes and data from the last 24 hours, including medications, laboratory results, vital signs, radiograph results, consultant recommendations and pathology results.      I have formulated and discussed the plan with the BMT team. I discussed the course and plan with the patient/family and answered all of their questions to the best of my ability. I counseled them regarding the followin yo with VHR B cell ALL here for matched sibling transplant with TBI and post transplant cytoxan, at risk for nausea and vomiting- kytril scheduled and will schedule phenergan and keep ativan PRN, at risk for VOD- defibrotide and ursodiol, at risk for malnutrition- will follow closely, at risk of opportunistic infection- prophylaxis with levo, acyclovir and hannah, history of WPW- keep electrolytes normal and avoid  catecholinergics.  Pain- morphine PRN.  He will continue TBI through Thursday.         My care coordination activities today include oversight of planned lab studies, oversight of planned radiographic studies, oversight of medication changes, discussion with BMT team-members and discussion with consultants.     My total floor time today was at least 35 minutes, greater than 50% of which was counseling and coordination of care.        Viky Zavala MD, PhD    Pediatric Blood and Marrow Transplant  [facility]              Patient Active Problem List   Diagnosis     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)     Aaron-Parkinson-White (WPW) syndrome     Bone marrow transplant candidate     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)     At risk for infection

## 2018-10-31 NOTE — PLAN OF CARE
Problem: Patient Care Overview  Goal: Plan of Care/Patient Progress Review  PT: Cancel - Attempt x3 throughout day - patient sleeping or at TBI. Plan to provide patient and family with education for physical activity following BMT including understanding lab values. Will try back tomorrow to initiate therapies.

## 2018-10-31 NOTE — PLAN OF CARE
Problem: Stem Cell/Bone Marrow Transplant (Adult)  Goal: Signs and Symptoms of Listed Potential Problems Will be Absent, Minimized or Managed (Stem Cell/Bone Marrow Transplant)  Signs and symptoms of listed potential problems will be absent, minimized or managed by discharge/transition of care (reference Stem Cell/Bone Marrow Transplant (Adult) CPG).   Outcome: No Change  Afebrile, VSS, lung sounds clear. Pt had 2 emesis, PRN phenergan and ativan given with some relief. Pt also complained of abdominal discomfort and cramping, PRN oxycodone given. Pt sleeping upon pain reassessment. KCL replacement given. Hourly rounding completed.

## 2018-10-31 NOTE — PROGRESS NOTES
"This week I've attempted to talk with Artemio about the FACE-BMT Advance Care Planning research study to which he and his parents consented last Friday. Attempted visits on Monday (patient resting on all attempts). Visit Tuesday, patient sleeping roused briefly, gestured with a wave, and then fell back to sleep.  Attempts today, patient has been sleeping all day. I spoke with Artemio's father, Bryan, on Monday and his mother, Mervat, and maternal grandmother Tuesday and today.  Parents both indicate that they're interested in participating in the study but they worry that we \"missed our window of opportunity\" because Artemio has been either sleeping or very nauseous since his admission. Mother noted that when he is awake \"he's kind of out of it\" so she's concerned that he \"wouldn't be thinking clearly even if he's awake.\"  I explained that I'll speak to the Research Study PI  (and Artemio when he is able to do so) about whether we re-attempt when Artemio is feeling better or withdraw him from the study.  "

## 2018-10-31 NOTE — PLAN OF CARE
Problem: Patient Care Overview  Goal: Plan of Care/Patient Progress Review  Outcome: No Change  During the evening pt denied pain or nauses, had received doses of ativan and tylenol for nausea and headache just before shift.  He stated these helped.  Overnight he seemed to sleep lightly and woke easily with vitals but denied pain or nausea.  I>O at 0000 by approx 1,130 mls, has been mostly sleeping and is due to void this morning.  Will continue to monitor and notify MD with concerns. Plan to hep-lock for TBI this morning.  Hourly rounding completed.  Continue POC.

## 2018-11-01 ENCOUNTER — APPOINTMENT (OUTPATIENT)
Dept: PHYSICAL THERAPY | Facility: CLINIC | Age: 18
End: 2018-11-01
Attending: NURSE PRACTITIONER
Payer: COMMERCIAL

## 2018-11-01 ENCOUNTER — APPOINTMENT (OUTPATIENT)
Dept: RADIATION ONCOLOGY | Facility: CLINIC | Age: 18
End: 2018-11-01
Attending: RADIOLOGY
Payer: COMMERCIAL

## 2018-11-01 ENCOUNTER — ONCOLOGY VISIT (OUTPATIENT)
Dept: RADIATION ONCOLOGY | Facility: CLINIC | Age: 18
End: 2018-11-01

## 2018-11-01 LAB
ABO + RH BLD: NORMAL
ABO + RH BLD: NORMAL
ANION GAP SERPL CALCULATED.3IONS-SCNC: 8 MMOL/L (ref 3–14)
ANISOCYTOSIS BLD QL SMEAR: SLIGHT
APTT PPP: 31 SEC (ref 22–37)
BASOPHILS # BLD AUTO: 0 10E9/L (ref 0–0.2)
BASOPHILS NFR BLD AUTO: 0 %
BILIRUB DIRECT SERPL-MCNC: 0.2 MG/DL (ref 0–0.2)
BILIRUB SERPL-MCNC: 1 MG/DL (ref 0.2–1.3)
BLD GP AB SCN SERPL QL: NORMAL
BLOOD BANK CMNT PATIENT-IMP: NORMAL
BUN SERPL-MCNC: 6 MG/DL (ref 7–30)
CALCIUM SERPL-MCNC: 7.8 MG/DL (ref 8.5–10.1)
CHLORIDE SERPL-SCNC: 108 MMOL/L (ref 94–109)
CO2 SERPL-SCNC: 25 MMOL/L (ref 20–32)
CREAT SERPL-MCNC: 0.96 MG/DL (ref 0.66–1.25)
DACRYOCYTES BLD QL SMEAR: SLIGHT
DIFFERENTIAL METHOD BLD: ABNORMAL
ELLIPTOCYTES BLD QL SMEAR: ABNORMAL
EOSINOPHIL # BLD AUTO: 0 10E9/L (ref 0–0.7)
EOSINOPHIL NFR BLD AUTO: 0.9 %
ERYTHROCYTE [DISTWIDTH] IN BLOOD BY AUTOMATED COUNT: 15.8 % (ref 10–15)
FIBRINOGEN PPP-MCNC: 364 MG/DL (ref 200–420)
GFR SERPL CREATININE-BSD FRML MDRD: >90 ML/MIN/1.7M2
GLUCOSE SERPL-MCNC: 128 MG/DL (ref 70–99)
HCT VFR BLD AUTO: 25.5 % (ref 40–53)
HGB BLD-MCNC: 8.4 G/DL (ref 13.3–17.7)
INR PPP: 1.14 (ref 0.86–1.14)
LYMPHOCYTES # BLD AUTO: 0.1 10E9/L (ref 0.8–5.3)
LYMPHOCYTES NFR BLD AUTO: 2.6 %
MCH RBC QN AUTO: 28.8 PG (ref 26.5–33)
MCHC RBC AUTO-ENTMCNC: 32.9 G/DL (ref 31.5–36.5)
MCV RBC AUTO: 87 FL (ref 78–100)
MICROCYTES BLD QL SMEAR: PRESENT
MONOCYTES # BLD AUTO: 0 10E9/L (ref 0–1.3)
MONOCYTES NFR BLD AUTO: 0.9 %
NEUTROPHILS # BLD AUTO: 5.3 10E9/L (ref 1.6–8.3)
NEUTROPHILS NFR BLD AUTO: 95.6 %
NRBC # BLD AUTO: 0 10*3/UL
NRBC BLD AUTO-RTO: 1 /100
PLATELET # BLD AUTO: 139 10E9/L (ref 150–450)
PLATELET # BLD EST: ABNORMAL 10*3/UL
POIKILOCYTOSIS BLD QL SMEAR: ABNORMAL
POTASSIUM SERPL-SCNC: 3.3 MMOL/L (ref 3.4–5.3)
POTASSIUM SERPL-SCNC: 3.7 MMOL/L (ref 3.4–5.3)
POTASSIUM SERPL-SCNC: 3.9 MMOL/L (ref 3.4–5.3)
RBC # BLD AUTO: 2.92 10E12/L (ref 4.4–5.9)
RBC INCLUSIONS BLD: SLIGHT
SODIUM SERPL-SCNC: 141 MMOL/L (ref 133–144)
SPECIMEN EXP DATE BLD: NORMAL
WBC # BLD AUTO: 5.5 10E9/L (ref 4–11)

## 2018-11-01 PROCEDURE — 85610 PROTHROMBIN TIME: CPT | Performed by: NURSE PRACTITIONER

## 2018-11-01 PROCEDURE — 86901 BLOOD TYPING SEROLOGIC RH(D): CPT | Performed by: NURSE PRACTITIONER

## 2018-11-01 PROCEDURE — 40000918 ZZH STATISTIC PT IP PEDS VISIT

## 2018-11-01 PROCEDURE — 25000132 ZZH RX MED GY IP 250 OP 250 PS 637: Performed by: NURSE PRACTITIONER

## 2018-11-01 PROCEDURE — 77336 RADIATION PHYSICS CONSULT: CPT | Performed by: RADIOLOGY

## 2018-11-01 PROCEDURE — 82247 BILIRUBIN TOTAL: CPT | Performed by: NURSE PRACTITIONER

## 2018-11-01 PROCEDURE — 25000128 H RX IP 250 OP 636: Performed by: NURSE PRACTITIONER

## 2018-11-01 PROCEDURE — 97530 THERAPEUTIC ACTIVITIES: CPT | Mod: GP

## 2018-11-01 PROCEDURE — 86850 RBC ANTIBODY SCREEN: CPT | Performed by: NURSE PRACTITIONER

## 2018-11-01 PROCEDURE — 25800025 ZZH RX 258: Performed by: PEDIATRICS

## 2018-11-01 PROCEDURE — 25000128 H RX IP 250 OP 636: Performed by: PEDIATRICS

## 2018-11-01 PROCEDURE — 84132 ASSAY OF SERUM POTASSIUM: CPT | Performed by: PEDIATRICS

## 2018-11-01 PROCEDURE — C9399 UNCLASSIFIED DRUGS OR BIOLOG: HCPCS | Performed by: PEDIATRICS

## 2018-11-01 PROCEDURE — 85730 THROMBOPLASTIN TIME PARTIAL: CPT | Performed by: NURSE PRACTITIONER

## 2018-11-01 PROCEDURE — 82248 BILIRUBIN DIRECT: CPT | Performed by: NURSE PRACTITIONER

## 2018-11-01 PROCEDURE — 77412 RADIATION TX DELIVERY LVL 3: CPT | Performed by: RADIOLOGY

## 2018-11-01 PROCEDURE — 97161 PT EVAL LOW COMPLEX 20 MIN: CPT | Mod: GP

## 2018-11-01 PROCEDURE — 84132 ASSAY OF SERUM POTASSIUM: CPT | Performed by: NURSE PRACTITIONER

## 2018-11-01 PROCEDURE — 86900 BLOOD TYPING SEROLOGIC ABO: CPT | Performed by: NURSE PRACTITIONER

## 2018-11-01 PROCEDURE — 25000132 ZZH RX MED GY IP 250 OP 250 PS 637: Performed by: PEDIATRICS

## 2018-11-01 PROCEDURE — 85384 FIBRINOGEN ACTIVITY: CPT | Performed by: NURSE PRACTITIONER

## 2018-11-01 PROCEDURE — 80048 BASIC METABOLIC PNL TOTAL CA: CPT | Performed by: NURSE PRACTITIONER

## 2018-11-01 PROCEDURE — 77412 RADIATION TX DELIVERY LVL 3: CPT | Mod: XE | Performed by: RADIOLOGY

## 2018-11-01 PROCEDURE — 20000002 ZZH R&B BMT INTERMEDIATE

## 2018-11-01 PROCEDURE — 85025 COMPLETE CBC W/AUTO DIFF WBC: CPT | Performed by: NURSE PRACTITIONER

## 2018-11-01 RX ORDER — ALBUTEROL SULFATE 90 UG/1
2 AEROSOL, METERED RESPIRATORY (INHALATION)
Status: DISCONTINUED | OUTPATIENT
Start: 2018-11-01 | End: 2018-11-07

## 2018-11-01 RX ORDER — DEXTROSE MONOHYDRATE, SODIUM CHLORIDE, AND POTASSIUM CHLORIDE 50; 1.49; 4.5 G/1000ML; G/1000ML; G/1000ML
INJECTION, SOLUTION INTRAVENOUS CONTINUOUS
Status: DISCONTINUED | OUTPATIENT
Start: 2018-11-01 | End: 2018-11-05

## 2018-11-01 RX ORDER — ALBUTEROL SULFATE 0.83 MG/ML
2.5 SOLUTION RESPIRATORY (INHALATION)
Status: DISCONTINUED | OUTPATIENT
Start: 2018-11-01 | End: 2018-11-07

## 2018-11-01 RX ORDER — SALIVA SUBSTITUTE COMBO NO.2
30 SOLUTION, ORAL MUCOUS MEMBRANE EVERY 4 HOURS PRN
Status: DISCONTINUED | OUTPATIENT
Start: 2018-11-01 | End: 2018-11-11

## 2018-11-01 RX ORDER — SCOLOPAMINE TRANSDERMAL SYSTEM 1 MG/1
1 PATCH, EXTENDED RELEASE TRANSDERMAL
Status: DISCONTINUED | OUTPATIENT
Start: 2018-11-01 | End: 2018-11-01

## 2018-11-01 RX ADMIN — Medication 25 MG: at 16:14

## 2018-11-01 RX ADMIN — URSODIOL 300 MG: 300 CAPSULE ORAL at 13:02

## 2018-11-01 RX ADMIN — Medication 25 MG: at 04:54

## 2018-11-01 RX ADMIN — GRANISETRON HYDROCHLORIDE 1 MG: 1 INJECTION INTRAVENOUS at 07:44

## 2018-11-01 RX ADMIN — LORAZEPAM 0.94 MG: 2 INJECTION INTRAMUSCULAR; INTRAVENOUS at 01:10

## 2018-11-01 RX ADMIN — URSODIOL 300 MG: 300 CAPSULE ORAL at 20:37

## 2018-11-01 RX ADMIN — Medication 25 MG: at 22:42

## 2018-11-01 RX ADMIN — ACYCLOVIR SODIUM 700 MG: 50 INJECTION, SOLUTION INTRAVENOUS at 16:09

## 2018-11-01 RX ADMIN — URSODIOL 300 MG: 300 CAPSULE ORAL at 07:44

## 2018-11-01 RX ADMIN — DEFIBROTIDE SODIUM 406 MG: 80 INJECTION, SOLUTION INTRAVENOUS at 02:47

## 2018-11-01 RX ADMIN — Medication 25 MG: at 10:24

## 2018-11-01 RX ADMIN — DEFIBROTIDE SODIUM 406 MG: 80 INJECTION, SOLUTION INTRAVENOUS at 16:13

## 2018-11-01 RX ADMIN — Medication 30 ML: at 17:22

## 2018-11-01 RX ADMIN — DEFIBROTIDE SODIUM 406 MG: 80 INJECTION, SOLUTION INTRAVENOUS at 08:55

## 2018-11-01 RX ADMIN — SODIUM CHLORIDE, PRESERVATIVE FREE 2 ML: 5 INJECTION INTRAVENOUS at 13:33

## 2018-11-01 RX ADMIN — POTASSIUM CHLORIDE 10 MEQ: 29.8 INJECTION, SOLUTION INTRAVENOUS at 01:46

## 2018-11-01 RX ADMIN — MICAFUNGIN SODIUM 150 MG: 10 INJECTION, POWDER, LYOPHILIZED, FOR SOLUTION INTRAVENOUS at 10:25

## 2018-11-01 RX ADMIN — DEFIBROTIDE SODIUM 406 MG: 80 INJECTION, SOLUTION INTRAVENOUS at 20:37

## 2018-11-01 RX ADMIN — SODIUM CHLORIDE, PRESERVATIVE FREE 4 ML: 5 INJECTION INTRAVENOUS at 13:33

## 2018-11-01 RX ADMIN — GRANISETRON HYDROCHLORIDE 1 MG: 1 INJECTION INTRAVENOUS at 20:37

## 2018-11-01 RX ADMIN — Medication 2 G: at 22:42

## 2018-11-01 RX ADMIN — ACYCLOVIR SODIUM 700 MG: 50 INJECTION, SOLUTION INTRAVENOUS at 07:44

## 2018-11-01 RX ADMIN — PANTOPRAZOLE SODIUM 40 MG: 20 TABLET, DELAYED RELEASE ORAL at 07:44

## 2018-11-01 RX ADMIN — Medication 2 G: at 05:53

## 2018-11-01 RX ADMIN — POTASSIUM CHLORIDE, DEXTROSE MONOHYDRATE AND SODIUM CHLORIDE: 150; 5; 450 INJECTION, SOLUTION INTRAVENOUS at 10:23

## 2018-11-01 RX ADMIN — DEXTROSE AND SODIUM CHLORIDE: 5; 450 INJECTION, SOLUTION INTRAVENOUS at 01:11

## 2018-11-01 RX ADMIN — POTASSIUM CHLORIDE, DEXTROSE MONOHYDRATE AND SODIUM CHLORIDE: 150; 5; 450 INJECTION, SOLUTION INTRAVENOUS at 10:24

## 2018-11-01 RX ADMIN — Medication 2 G: at 13:02

## 2018-11-01 NOTE — PLAN OF CARE
Problem: Patient Care Overview  Goal: Plan of Care/Patient Progress Review  Discharge Planner PT   Patient plan for discharge: Home with assist  Current status: Eval completed - due to patient feeling nauseous and fatigued therapy session focused on providing patient and family education surrounding the importance of activity following BMT. Provided patient with lab value handout and LE proximal strengthening exercises. Will continue to follow patient to prevent further regression in strength and activity tolerance 2/2 to prolonged hospitalization.   Barriers to return to prior living situation: medical status.  Recommendations for discharge: home with assist.        Entered by: Sydnee Talavera 11/01/2018 12:44 PM

## 2018-11-01 NOTE — MR AVS SNAPSHOT
After Visit Summary   2018    Artemio Nino    MRN: [id number]           Patient Information     Date Of Birth          2000        Visit Information        Provider Department      2018 10:00 PM Ester Early MD [facility]         Follow-ups after your visit        Who to contact     Please call your clinic at [phone] to:    Ask questions about your health    Make or cancel appointments    Discuss your medicines    Learn about your test results    Speak to your doctor            Additional Information About Your Visit        MyChart Information     [employer] is an electronic gateway that provides easy, online access to your medical records. With [employer], you can request a clinic appointment, read your test results, renew a prescription or communicate with your care team.     To sign up for [employer] visit the website at [email or web address]   You will be asked to enter the access code listed below, as well as some personal information. Please follow the directions to create your username and password.     Your access code is: [email or web address]  Expires: 2019 10:57 AM     Your access code will  in 90 days. If you need help or a new code, please contact your [facility] or call [phone] for assistance.        Care EveryWhere ID     This is your Care EveryWhere ID. This could be used by other organizations to access your [facility] medical records  [id number]         Blood Pressure from Last 3 Encounters:   No data found for BP    Weight from Last 3 Encounters:   No data found for Wt              Today, you had the following     No orders found for display         Today's Medication Changes      Notice     This visit is during an admission. Changes to the med list made in this visit will be reflected in the After Visit Summary of the admission.             Primary Care Provider Office Phone # Fax #    Viky HARVEY  MD Sally [phone]       [facility] [facility] [address]        Equal Access to Services     CHARY MEJIAS : Celia Carlos, scott crawley, mega malonemasebastián gomez, jessy jwin hayaazay lisaflorida frias arpita madera. So [facility] [phone].    ATENCIÓN: Si habla español, tiene a london disposición servicios gratuitos de asistencia lingüística. Llame al [phone].    We comply with applicable federal civil rights laws and [place] laws. We do not discriminate on the basis of race, color, national origin, age, disability, sex, sexual orientation, or gender identity.            Thank you!     Thank you for choosing [facility]  for your care. Our goal is always to provide you with excellent care. Hearing back from our patients is one way we can continue to improve our services. Please take a few minutes to complete the written survey that you may receive in the mail after your visit with us. Thank you!             Your Updated Medication List - Protect others around you: Learn how to safely use, store and throw away your medicines at www.disposemymeds.org.      Notice     This visit is during an admission. Changes to the med list made in this visit will be reflected in the After Visit Summary of the admission.

## 2018-11-01 NOTE — PLAN OF CARE
Problem: Stem Cell/Bone Marrow Transplant (Adult)  Goal: Signs and Symptoms of Listed Potential Problems Will be Absent, Minimized or Managed (Stem Cell/Bone Marrow Transplant)  Signs and symptoms of listed potential problems will be absent, minimized or managed by discharge/transition of care (reference Stem Cell/Bone Marrow Transplant (Adult) CPG).   Outcome: No Change  Temp max 99.2. HR 70-80s. RR 16-18. BP 90-100s/40-50s. Lung sounds clear on RA. No complaints of pain. 1 small emesis, given prn ativan x1. Potassium replaced x2, current recheck 3.9. Voiding. 1 large stool. Hourly rounding completed. Continue with POC.

## 2018-11-01 NOTE — PLAN OF CARE
Problem: Stem Cell/Bone Marrow Transplant (Adult)  Goal: Signs and Symptoms of Listed Potential Problems Will be Absent, Minimized or Managed (Stem Cell/Bone Marrow Transplant)  Signs and symptoms of listed potential problems will be absent, minimized or managed by discharge/transition of care (reference Stem Cell/Bone Marrow Transplant (Adult) CPG).   Outcome: No Change  Afebrile. BP softer this shift, MD notified, recheck slightly higher. OVSS. LSC, on RA. Pt complaining of nausea, phenergan scheduled. Slept most of shift. TBI completed. Potassium given, recheck to be drawn. Hourly rounding completed, continue POC.

## 2018-11-01 NOTE — PROGRESS NOTES
Pediatric BMT Daily Progress Note    Interval Events:  Temperature 100.7 last evening, blood cultures obtained and started on empiric Cefepime therapy. Artemio had blood pressures lower than baseline yesterday afternoon, improved overnight. He continues to have significant nausea related to TBI.     Review of Systems: Pertinent positives include those mentioned in interval events. A complete review of systems was performed and is otherwise negative.      Medications:  Please see MAR    Physical Exam:  Temp:  [98.1  F (36.7  C)-100.7  F (38.2  C)] 99.1  F (37.3  C)  Pulse:  [70-83] 75  Heart Rate:  [72] 72  Resp:  [16-18] 16  BP: ()/(40-73) 101/53  SpO2:  [97 %-100 %] 98 %  I/O last 3 completed shifts:  In: 3110.9 [I.V.:3110.9]  Out: 2425 [Urine:1775; Emesis/NG output:450; Stool:200]    GEN: Lying in bed, resistant/frustrated with exam, no acute distress  HEENT: Normocephalic, atraumatic, alopecia, sclera anicteric, non-injected, mucous membranes slightly dry, nares patent without discharge  CARD: Heart rate 80's, regular rhythm. No murmurs, rubs or gallops.  Cap refill 2 seconds.  RESP: Lungs clear to auscultation. No increased work of breathing, crackles or wheezes.   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 He is currently BMT Transplant Date: BMT Txp 11/2/2018 (-1 days).  He is doing well clinically at this time.  He continues to have nausea with TBI with abdominal cramping.  Appetite is minimal.      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  Conditioning per 2015-29: TBI days -4 through -1 followed by  matched sibling donor transplant on 11/2   - continues TBI BID today      # Risk for GVHD:   - Post-transplant Cytoxan days +3 and +4  - Tacrolimus and MMF to start day +5      FEN/Renal:  # Risk for malnutrition: no history of TPN or enteral nutrition dependence   - Dietician consult upon admission, to follow   - Continue age appropriate diet   - mIVF D5 1/2 NS @ 100 mL/hr      # Risk for electrolyte abnormalities:  - check daily electrolytes. Given cardiac history, K>3.5, Mg>1.8, iCa nml.   - Adding potassium in IV fluids today given significant IV replacement requirements (D5 0.45 NaCl with 20 mEq/L K at 100 mL/hr)     # Risk for renal dysfunction and fluid overload:  - monitor I/O's and daily weights upon admission       # Risk for aHUS/TA-TMA: surveillance to initiate upon admission   - monitor LDH qMonday  - monitor urine protein/creatinine qTuesday      Pulmonary:  # Risk for pulmonary toxicity secondary to chemotherapy:   - PFTs scheduled for 10/24      # Rhinorrhea: 3 mos history, mild. Sinus CT (10/22) with findings of mucosal thickening without evidence of active infection  - RVP 10/22 resulted negative      # Pneumomediastinum: finding upon work up Chest CT (10/22), asymptomatic. Per radiology, likely benign, perhaps transient.   - Patient instructed to notify care team with any SOB, dyspnea, or remarkable cough      Cardiovascular:  # Aaron-Parkinson-White Syndrome: diagnosed upon syncope in 02/2018. Asymptomatic, no interventions to date. Work up EKG c/w WPW, sinus bradycardia at 49 bpm. Work up ECHO normal with EF 68%.   Cardiology visit with Dr. Plummer (10/22): Holter revealed 10 PACs with no evidence of AV block. Normal Echocardiogram with EF 68%. Isael has hx of HR in high 30s with sleep.  - Avoid catecholinergics as able to avoid tachycardia  - Maintain normal iCa+, mag > 1.8, K > 3.5         Heme:   # Pancytopenia secondary to chemotherapy: hx of one platelet transfusion, well tolerated  -  Transfuse for hemoglobin < 8, platelets < 30,000 (Defibrotide)  - No previous premedications, monitor for need  - GCSF to start day +5 until ANC >1500 x3      Infectious Disease:  # Risk for infection given immunocompromised status:   Active: fever last evening, blood cultures drawn. Cefepime started, follow blood cultures.     ID history: 3 mos hx of mild rhinorrhea. Chest/sinus CT without evidence of infection. 10/22 RVP negative.      Prophylaxis:                                                                                                                        - viral prophylaxis: CMV neg (donor CMV positive), HSV positive. Acyclovir  - fungal prophylaxis: high risk- Micafungin    - bacterial prophylaxis: Discontinue Levofloxacin (Cefepime started for fever 10/31)      Past infections: none significant       GI:   # Nausea management:   - Scheduled medications: Kytril Q12H, Phenergan Q6H. Add Scopolamine patch today.   - PRN medications: Ativan, Benadryl, marinol      # Risk for VOD: especially high risk given previous Inotuzumab therapy.   - Ursodiol TID (10 ml/kg)  - Defibrotide ppx      Neuro:  # Mucositis/pain (anticipated):   - Morphine 2-4 mg Q 3H PRN  - Integrative therapy       The above plan of care was developed by and communicated to me by the   Pediatric BMT attending physician, Dr. Viky Zavala.      Ester Ochoa MD  Pediatric BMT Hospitalist      BMT Attending Note:     Artemio was seen and evaluated by me today. Febrile to 100.7     The significant interval history includes still nauseous.       I have reviewed changes and data from the last 24 hours, including medications, laboratory results, vital signs, radiograph results, consultant recommendations and pathology results.      I have formulated and discussed the plan with the BMT team. I discussed the course and plan with the patient/family and answered all of their questions to the best of my ability. I counseled them regarding the  followin yo with VHR B cell ALL here for matched sibling transplant with TBI and post transplant cytoxan, at risk for nausea and vomiting- kytril scheduled and scheduled phenergan  and will add scopolamine today and ativan PRN, at risk for VOD- defibrotide and ursodiol, at risk for malnutrition- will follow closely- on IVF and will add KCl to his maintenance fluids , at risk of opportunistic infection- febrile on cefepime and  prophylaxis acyclovir and hannah, history of WPW- keep electrolytes normal and avoid catecholinergics.  Pain- morphine PRN.  He will continue TBI through today.  Transplant tomorrow. We discussed logistics with Link, his brother, today as well for tomorrow with the harvest.        My care coordination activities today include oversight of planned lab studies, oversight of planned radiographic studies, oversight of medication changes, discussion with BMT team-members and discussion with consultants.     My total floor time today was at least 35 minutes, greater than 50% of which was counseling and coordination of care.        Viky Zavala MD, PhD    Pediatric Blood and Marrow Transplant  [facility]        Patient Active Problem List   Diagnosis     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)     Aaron-Parkinson-White (WPW) syndrome     Bone marrow transplant candidate     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)     At risk for infection

## 2018-11-01 NOTE — PLAN OF CARE
Problem: Stem Cell/Bone Marrow Transplant (Adult)  Goal: Signs and Symptoms of Listed Potential Problems Will be Absent, Minimized or Managed (Stem Cell/Bone Marrow Transplant)  Signs and symptoms of listed potential problems will be absent, minimized or managed by discharge/transition of care (reference Stem Cell/Bone Marrow Transplant (Adult) CPG).   Afebrile, vital signs within desired limits. Lung sounds clear on room air. No complaints of nausea or pain. Tolerated PO meds and small servings of applesauce. Good UOP. Pt sleeping most of shift. TBI completed. X2. Family intermittently at bedside. Hourly rounding completed. Continue with POC.

## 2018-11-02 LAB
ANION GAP SERPL CALCULATED.3IONS-SCNC: 4 MMOL/L (ref 3–14)
ANISOCYTOSIS BLD QL SMEAR: SLIGHT
BASOPHILS # BLD AUTO: 0 10E9/L (ref 0–0.2)
BASOPHILS NFR BLD AUTO: 0 %
BUN SERPL-MCNC: 5 MG/DL (ref 7–30)
CA-I BLD-MCNC: 4.8 MG/DL (ref 4.4–5.2)
CALCIUM SERPL-MCNC: 8 MG/DL (ref 8.5–10.1)
CHLORIDE SERPL-SCNC: 111 MMOL/L (ref 94–109)
CO2 SERPL-SCNC: 28 MMOL/L (ref 20–32)
CREAT SERPL-MCNC: 0.87 MG/DL (ref 0.66–1.25)
DACRYOCYTES BLD QL SMEAR: SLIGHT
DIFFERENTIAL METHOD BLD: ABNORMAL
ELLIPTOCYTES BLD QL SMEAR: ABNORMAL
EOSINOPHIL # BLD AUTO: 0.1 10E9/L (ref 0–0.7)
EOSINOPHIL NFR BLD AUTO: 2.6 %
ERYTHROCYTE [DISTWIDTH] IN BLOOD BY AUTOMATED COUNT: 15.6 % (ref 10–15)
GFR SERPL CREATININE-BSD FRML MDRD: >90 ML/MIN/1.7M2
GLUCOSE SERPL-MCNC: 102 MG/DL (ref 70–99)
HCT VFR BLD AUTO: 22.3 % (ref 40–53)
HGB BLD-MCNC: 7.4 G/DL (ref 13.3–17.7)
LYMPHOCYTES # BLD AUTO: 0 10E9/L (ref 0.8–5.3)
LYMPHOCYTES NFR BLD AUTO: 0.9 %
MAGNESIUM SERPL-MCNC: 1.9 MG/DL (ref 1.6–2.3)
MCH RBC QN AUTO: 28.8 PG (ref 26.5–33)
MCHC RBC AUTO-ENTMCNC: 33.2 G/DL (ref 31.5–36.5)
MCV RBC AUTO: 87 FL (ref 78–100)
MICROCYTES BLD QL SMEAR: PRESENT
MONOCYTES # BLD AUTO: 0 10E9/L (ref 0–1.3)
MONOCYTES NFR BLD AUTO: 0 %
NEUTROPHILS # BLD AUTO: 4.7 10E9/L (ref 1.6–8.3)
NEUTROPHILS NFR BLD AUTO: 96.5 %
PLATELET # BLD AUTO: 126 10E9/L (ref 150–450)
PLATELET # BLD EST: NORMAL 10*3/UL
POIKILOCYTOSIS BLD QL SMEAR: ABNORMAL
POTASSIUM SERPL-SCNC: 3.8 MMOL/L (ref 3.4–5.3)
RBC # BLD AUTO: 2.57 10E12/L (ref 4.4–5.9)
RBC INCLUSIONS BLD: SLIGHT
SODIUM SERPL-SCNC: 143 MMOL/L (ref 133–144)
WBC # BLD AUTO: 4.9 10E9/L (ref 4–11)

## 2018-11-02 PROCEDURE — 83735 ASSAY OF MAGNESIUM: CPT | Performed by: NURSE PRACTITIONER

## 2018-11-02 PROCEDURE — 82330 ASSAY OF CALCIUM: CPT | Performed by: PEDIATRICS

## 2018-11-02 PROCEDURE — C9399 UNCLASSIFIED DRUGS OR BIOLOG: HCPCS | Performed by: PEDIATRICS

## 2018-11-02 PROCEDURE — 25000128 H RX IP 250 OP 636: Performed by: PEDIATRICS

## 2018-11-02 PROCEDURE — 85025 COMPLETE CBC W/AUTO DIFF WBC: CPT | Performed by: NURSE PRACTITIONER

## 2018-11-02 PROCEDURE — 81500001 ZZH ACQUISITION BONE MARROW RELATED DONOR

## 2018-11-02 PROCEDURE — 25800025 ZZH RX 258: Performed by: PEDIATRICS

## 2018-11-02 PROCEDURE — 80048 BASIC METABOLIC PNL TOTAL CA: CPT | Performed by: NURSE PRACTITIONER

## 2018-11-02 PROCEDURE — XW04392 INTRODUCTION OF DEFIBROTIDE SODIUM ANTICOAGULANT INTO CENTRAL VEIN, PERCUTANEOUS APPROACH, NEW TECHNOLOGY GROUP 2: ICD-10-PCS | Performed by: PEDIATRICS

## 2018-11-02 PROCEDURE — 30243G2 TRANSFUSION OF ALLOGENEIC RELATED BONE MARROW INTO CENTRAL VEIN, PERCUTANEOUS APPROACH: ICD-10-PCS | Performed by: PEDIATRICS

## 2018-11-02 PROCEDURE — 20000002 ZZH R&B BMT INTERMEDIATE

## 2018-11-02 PROCEDURE — 25000132 ZZH RX MED GY IP 250 OP 250 PS 637: Performed by: NURSE PRACTITIONER

## 2018-11-02 PROCEDURE — 38214 VOLUME DEPLETE OF HARVEST: CPT | Performed by: PEDIATRICS

## 2018-11-02 PROCEDURE — 25000128 H RX IP 250 OP 636: Performed by: NURSE PRACTITIONER

## 2018-11-02 PROCEDURE — 25000132 ZZH RX MED GY IP 250 OP 250 PS 637: Performed by: PEDIATRICS

## 2018-11-02 RX ORDER — FLUORIDE TOOTHPASTE
5-10 TOOTHPASTE DENTAL 4 TIMES DAILY PRN
Status: DISCONTINUED | OUTPATIENT
Start: 2018-11-02 | End: 2018-11-21

## 2018-11-02 RX ORDER — HYDRALAZINE HYDROCHLORIDE 20 MG/ML
20 INJECTION INTRAMUSCULAR; INTRAVENOUS EVERY 4 HOURS PRN
Status: DISCONTINUED | OUTPATIENT
Start: 2018-11-02 | End: 2018-11-21

## 2018-11-02 RX ORDER — FUROSEMIDE 10 MG/ML
10 INJECTION INTRAMUSCULAR; INTRAVENOUS ONCE
Status: COMPLETED | OUTPATIENT
Start: 2018-11-02 | End: 2018-11-02

## 2018-11-02 RX ORDER — LABETALOL HYDROCHLORIDE 5 MG/ML
10 INJECTION, SOLUTION INTRAVENOUS EVERY 4 HOURS PRN
Status: DISCONTINUED | OUTPATIENT
Start: 2018-11-02 | End: 2018-11-21

## 2018-11-02 RX ORDER — HYDRALAZINE HYDROCHLORIDE 20 MG/ML
10 INJECTION INTRAMUSCULAR; INTRAVENOUS ONCE
Status: COMPLETED | OUTPATIENT
Start: 2018-11-02 | End: 2018-11-02

## 2018-11-02 RX ADMIN — ACYCLOVIR SODIUM 700 MG: 50 INJECTION, SOLUTION INTRAVENOUS at 18:19

## 2018-11-02 RX ADMIN — HYDRALAZINE HYDROCHLORIDE 13 MG: 20 INJECTION INTRAMUSCULAR; INTRAVENOUS at 18:19

## 2018-11-02 RX ADMIN — GRANISETRON HYDROCHLORIDE 1 MG: 1 INJECTION INTRAVENOUS at 08:03

## 2018-11-02 RX ADMIN — Medication 2 G: at 05:29

## 2018-11-02 RX ADMIN — POTASSIUM CHLORIDE, DEXTROSE MONOHYDRATE AND SODIUM CHLORIDE: 150; 5; 450 INJECTION, SOLUTION INTRAVENOUS at 19:40

## 2018-11-02 RX ADMIN — Medication 25 MG: at 03:39

## 2018-11-02 RX ADMIN — Medication 25 MG: at 12:39

## 2018-11-02 RX ADMIN — URSODIOL 300 MG: 300 CAPSULE ORAL at 19:40

## 2018-11-02 RX ADMIN — URSODIOL 300 MG: 300 CAPSULE ORAL at 14:17

## 2018-11-02 RX ADMIN — ACYCLOVIR SODIUM 700 MG: 50 INJECTION, SOLUTION INTRAVENOUS at 00:18

## 2018-11-02 RX ADMIN — Medication 25 MG: at 21:43

## 2018-11-02 RX ADMIN — ACETAMINOPHEN 975 MG: 325 TABLET, FILM COATED ORAL at 15:22

## 2018-11-02 RX ADMIN — FUROSEMIDE 10 MG: 10 INJECTION, SOLUTION INTRAMUSCULAR; INTRAVENOUS at 17:11

## 2018-11-02 RX ADMIN — MICAFUNGIN SODIUM 150 MG: 10 INJECTION, POWDER, LYOPHILIZED, FOR SOLUTION INTRAVENOUS at 09:44

## 2018-11-02 RX ADMIN — ACYCLOVIR SODIUM 700 MG: 50 INJECTION, SOLUTION INTRAVENOUS at 08:05

## 2018-11-02 RX ADMIN — Medication 25 MG: at 17:11

## 2018-11-02 RX ADMIN — PANTOPRAZOLE SODIUM 40 MG: 20 TABLET, DELAYED RELEASE ORAL at 08:05

## 2018-11-02 RX ADMIN — DEFIBROTIDE SODIUM 406 MG: 80 INJECTION, SOLUTION INTRAVENOUS at 19:40

## 2018-11-02 RX ADMIN — DIPHENHYDRAMINE HYDROCHLORIDE 50 MG: 50 INJECTION, SOLUTION INTRAMUSCULAR; INTRAVENOUS at 15:23

## 2018-11-02 RX ADMIN — Medication 2 G: at 21:43

## 2018-11-02 RX ADMIN — Medication 2 G: at 13:32

## 2018-11-02 RX ADMIN — DEFIBROTIDE SODIUM 406 MG: 80 INJECTION, SOLUTION INTRAVENOUS at 03:28

## 2018-11-02 RX ADMIN — HYDRALAZINE HYDROCHLORIDE 10 MG: 20 INJECTION INTRAMUSCULAR; INTRAVENOUS at 18:41

## 2018-11-02 RX ADMIN — DEFIBROTIDE SODIUM 406 MG: 80 INJECTION, SOLUTION INTRAVENOUS at 09:41

## 2018-11-02 RX ADMIN — URSODIOL 300 MG: 300 CAPSULE ORAL at 08:05

## 2018-11-02 RX ADMIN — GRANISETRON HYDROCHLORIDE 1 MG: 1 INJECTION INTRAVENOUS at 19:40

## 2018-11-02 NOTE — PROGRESS NOTES
CLINICAL NUTRITION SERVICES - PEDIATRIC ASSESSMENT NOTE    REASON FOR ASSESSMENT  Artemio Nino is a 18 year old male seen by the dietitian for LOS    ANTHROPOMETRICS  Height/Length: 174 cm  Weight: 65.1 k  BMI: 21.55 kg/m2, healthy weight for height  Dosing Weight: 65.1 kg  Comments:weight today 64.2 kg- down 1%    NUTRITION HISTORY  Patient is on a Regular diet at home.  Typical food/fluid intake is minimal currently due to nausea.  No history of PN or EN with past treatment  Information obtained from Patient and Family  Factors affecting nutrition intake include:decreased appetite and nausea    CURRENT NUTRITION ORDERS  Diet: Regular diet    PHYSICAL FINDINGS  Observed  No nutrition related findings observed    Transplant day today    LABS  Labs reviewed    MEDICATIONS  Medications reviewed    ASSESSED NUTRITION NEEDS:  Estimated Energy Needs: 25-30 kcal/kg  Estimated Protein Needs: 1.5 g/kg  Estimated Fluid Needs: 1mL/kcal      PEDIATRIC NUTRITION STATUS VALIDATION  Patient does not meet criteria for malnutrition.    NUTRITION DIAGNOSIS:  Predicted suboptimal nutrient intake related to anticipated decline in po related to transplant course     INTERVENTIONS  Nutrition Prescription  Po to meet nutrition needs for wt maintenance    Nutrition Education:   Provided education on nutrition during BMT- discussed that given his age, good po prior to admit, and BMI that plan to wait until after post-BMT cytoxin flush for start of PN.  Also discussed and encouraged po and provided an adult menu to increase options.    Implementation:  Collaboration and Referral of Nutrition care- pt discussed in rounds.    Goals  1. Wt maintenance within 5% of admit weight  2. Po and/or nutrition support to meet greater than 75% of nutritional needs    FOLLOW UP/MONITORING  Food and Beverage intake- monitor po, Enteral and parenteral nutrition intake- follow for need and Anthropometric measurements- monitor  wt    RECOMMENDATIONS  Following cytoxin flush, recommend PN of  Dextrose 225 g, GIR 2.4, 98 g Amino Acids, 1.5 g/kg Amino Acids, 240 mL lipids for 1637 kcals, (25 kcal/kg). PN is meeting 100% of kcal needs and 100% of protein needs.    Renetta Schwab, RD, LD, [facility]  [phone]

## 2018-11-02 NOTE — PROGRESS NOTES
Pediatric BMT Daily Progress Note    Interval Events:  Isael was afebrile over the last 24 hours.  He had dry mouth which responded minimally to caphasol PRN.    Review of Systems: Pertinent positives include those mentioned in interval events. A complete review of systems was performed and is otherwise negative.      Medications:  Please see MAR    Physical Exam:  Temp:  [97.8  F (36.6  C)-99.4  F (37.4  C)] 99.2  F (37.3  C)  Pulse:  [72-86] 72  Heart Rate:  [72-73] 73  Resp:  [18-24] 22  BP: ()/(48-59) 98/49  SpO2:  [98 %-100 %] 98 %  I/O last 3 completed shifts:  In: 3015.3 [I.V.:3015.3]  Out: 1850 [Urine:1800; Stool:50]    GEN: Lying in bed, no acute distress, answers questions.  HEENT: Normocephalic, atraumatic, alopecia, sclera anicteric, non-injected, mucous membranes slightly dry, nares patent without discharge  CARD: Heart regular rate and rhythm. No murmurs, rubs or gallops.  Cap refill 2 seconds.  RESP: Lungs clear to auscultation. No increased work of breathing, crackles or wheezes.   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 He is currently BMT Transplant Date: BMT Txp 11/2/2018 (0 days).  He is doing well clinically at this time.  His nausea is better today.       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  Conditioning per 2015-29: TBI days -4 through -1 followed by matched sibling donor transplant on 11/2   - Transplant today      # Risk for GVHD:   - Post-transplant Cytoxan days +3 and +4  - Tacrolimus and MMF to start day +5      FEN/Renal:  # Risk for malnutrition: no history of TPN or  enteral nutrition dependence   - Dietician consult upon admission, to follow   - Continue age appropriate diet   - mIVF D5 1/2 NS + 20 KCl @ 100 mL/hr  - plan to initiate TPN after Cytoxan fluids complete      # Risk for electrolyte abnormalities:  - check daily electrolytes. Given cardiac history, K>3.5, Mg>1.8, iCa nml.   - Has K+ in fluids     # Risk for renal dysfunction and fluid overload:  - monitor I/O's and daily weights upon admission       # Risk for aHUS/TA-TMA: surveillance to initiate upon admission   - monitor LDH qMonday  - monitor urine protein/creatinine qTuesday      Pulmonary:  # Risk for pulmonary toxicity secondary to chemotherapy:   - PFTs scheduled for 10/24      # Rhinorrhea: 3 mos history, mild. Sinus CT (10/22) with findings of mucosal thickening without evidence of active infection  - RVP 10/22 resulted negative      # Pneumomediastinum: finding upon work up Chest CT (10/22), asymptomatic. Per radiology, likely benign, perhaps transient.   - Patient instructed to notify care team with any SOB, dyspnea, or remarkable cough      Cardiovascular:  # Aaron-Parkinson-White Syndrome: diagnosed upon syncope in 02/2018. Asymptomatic, no interventions to date. Work up EKG c/w WPW, sinus bradycardia at 49 bpm. Work up ECHO normal with EF 68%.   Cardiology visit with Dr. Plummer (10/22): Holter revealed 10 PACs with no evidence of AV block. Normal Echocardiogram with EF 68%. Isael has hx of HR in high 30s with sleep.  - Avoid catecholinergics as able to avoid tachycardia  - Maintain normal iCa+, mag > 1.8, K > 3.5         Heme:   # Pancytopenia secondary to chemotherapy: hx of one platelet transfusion, well tolerated  - Transfuse for hemoglobin < 8, platelets < 30,000 (Defibrotide)  - No previous premedications, monitor for need  - GCSF to start day +5 until ANC >1500 x3  - No pRBC transfusion given transplant today      Infectious Disease:  # Risk for infection given immunocompromised status:    Active: afebrile overnight  - continue Cefepime through engraftment  - monitor blood cultures    ID history: 3 mos hx of mild rhinorrhea. Chest/sinus CT without evidence of infection. 10/22 RVP negative.      Prophylaxis:                                                                                                                        - viral prophylaxis: CMV neg (donor CMV positive), HSV positive. Acyclovir  - fungal prophylaxis: high risk- Micafungin    - bacterial prophylaxis: Cefepime through engraftment      Past infections: none significant       GI:   # Nausea management:   - Scheduled medications: Kytril Q12H, Phenergan Q6H.   - PRN medications: Ativan, Benadryl, marinol  - avoiding scop patch for now due to xerostomia      # Risk for VOD: especially high risk given previous Inotuzumab therapy.   - Ursodiol TID (10 ml/kg)  - Defibrotide ppx      Neuro:  # Mucositis/pain (anticipated):   - Morphine 2-4 mg Q 3H PRN  - Integrative therapy       The above plan of care was developed by and communicated to me by the   Pediatric BMT attending physician, Dr. Viky Zavala.      Candido Morales DO  Pediatric BMT Hospitalist      BMT Attending Note:     Artemio was seen and evaluated by me today.     The significant interval history includes Afebrile and transplant day!  Not as nauseated.  His main complaint is dry mouth.  Caphasol did not work.      I have reviewed changes and data from the last 24 hours, including medications, laboratory results, vital signs, radiograph results, consultant recommendations and pathology results.      I have formulated and discussed the plan with the BMT team. I discussed the course and plan with the patient/family and answered all of their questions to the best of my ability. I counseled them regarding the followin yo with VHR B cell ALL here for matched sibling transplant with TBI and post transplant cytoxan, at risk for nausea and vomiting- kytril scheduled and  scheduled phenergan and ativan PRN, at risk for VOD- defibrotide and ursodiol, at risk for malnutrition- will follow closely- on IVF, at risk of opportunistic infection- now afebrile on cefepime and  prophylaxis acyclovir and hannah, history of WPW- keep electrolytes normal and avoid catecholinergics.  Pain- morphine PRN.  I was present for the transplant today and available for questions.        My care coordination activities today include oversight of planned lab studies, oversight of planned radiographic studies, oversight of medication changes, discussion with BMT team-members and discussion with consultants.     My total floor time today was at least 35 minutes, greater than 50% of which was counseling and coordination of care.        Viky Zavala MD, PhD    Pediatric Blood and Marrow Transplant  [facility]        Patient Active Problem List   Diagnosis     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)     Aaron-Parkinson-White (WPW) syndrome     Bone marrow transplant candidate     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)     At risk for infection

## 2018-11-02 NOTE — PLAN OF CARE
Problem: Patient Care Overview  Goal: Plan of Care/Patient Progress Review  Outcome: No Change  Isael was afebrile. VSS. LS clear on room air. No nausea or vomiting. Pt did complain of dry mouth. Good UOP. No complaints of pain. Mom and Grandma Are at bedside. Hourly rounding complete. Will notify MD of changes. Continue with POC.

## 2018-11-02 NOTE — PLAN OF CARE
Problem: Patient Care Overview  Goal: Plan of Care/Patient Progress Review  Outcome: No Change  Pt afebrile, lungs clear/diminished, VSS. No complaints of pain or nausea. Hacking dry cough noted this evening, seemed improved later tonight. Complained of dry mouth - caphasol X 1 without much relief. Good urine output. 1 loose stool. Potassium check sent and pending. Resting comfortably. Hourly rounding completed.

## 2018-11-02 NOTE — PROCEDURES
BMT/Cellular Allogeneic Product Infusion       Patient Vitals for the past 24 hrs:   Temp Temp src Pulse Resp Heart Rate BP   11/01/18 1626 99.2  F (37.3  C) Axillary 86 20 - 109/59   11/01/18 2035 98.1  F (36.7  C) Axillary 72 24 - 100/52   11/02/18 0000 98.3  F (36.8  C) Axillary - 22 72 99/50   11/02/18 0400 99.2  F (37.3  C) Axillary - 22 73 98/49   11/02/18 0800 98.8  F (37.1  C) Axillary - 20 98 106/57   11/02/18 1200 98.9  F (37.2  C) Axillary - 20 96 113/67         BMT INFUSION DOCUMENTATION (last 48 hours)      BMT/Cellular Product Infusion     None                        Baseline Pre-Infusion Evaluation (to be completed by Provider):   Dyspnea: Grade 0 - none  Hypoxia: Grade 0 - not present  Fever: Grade 0 - afebrile  Chills: Grade 0 - none  Febrile Neutropenia: Grade 0 - not present  Sinus Bradycardia: Grade 0 - none  Hypertension: Grade 0 - none  Hypotension: Grade 0 - none  Chest Pain: Grade 0 - none  Bronchospasm: Grade 0 - none  Pain: Grade 0 - none  Rash: Grade 0 - None  Neurologic Specify: none    If adverse reactions, events or complications occur (fever greater than 2 degrees fahrenheit increase, and severe reactions of the following types: chills, dyspnea, bronchospasm, hyper/hypotension, hypoxia, bradycardia, chest pain, back/flank pain, hypoxia, and any other reaction deemed severe or life threatening; any instance of product bag breakage or unusual product appearance)    Any other events that are >= grade 3, then immediately contact the BMT Attending physician, the Cell Therapy Laboratory Medical Director (pager [phone]) and the Cell Therapy Laboratory ([phone]).  After midnight, holidays & weekends contact the [facility] on the appropriate campus ([facility] [place]: [phone]; [facility] [place]: [phone]).    Candido Morales, DO       BMT Post Infusion Documentation    Data   Patient Vitals for the past 72 hrs:   Temp Temp src Pulse Resp Heart Rate BP   10/31/18 0000  98.5  F (36.9  C) Axillary - 18 74 96/54   10/31/18 0400 99  F (37.2  C) Axillary - 18 79 97/55   10/31/18 0900 98.3  F (36.8  C) Axillary - 18 72 118/73   10/31/18 1242 98.1  F (36.7  C) Axillary 82 18 - 103/42   10/31/18 1600 98.6  F (37  C) Axillary 70 16 - (!) 84/40   10/31/18 1700 - - - - - 97/49   10/31/18 2100 100.7  F (38.2  C) Axillary 74 16 - (!) 87/48   11/01/18 0000 99.2  F (37.3  C) Axillary 83 16 - 97/49   11/01/18 0400 99.1  F (37.3  C) Axillary 75 16 - 101/53   11/01/18 0749 97.8  F (36.6  C) Axillary 73 18 - 95/50   11/01/18 1130 99.4  F (37.4  C) Axillary 78 18 - 109/48   11/01/18 1626 99.2  F (37.3  C) Axillary 86 20 - 109/59   11/01/18 2035 98.1  F (36.7  C) Axillary 72 24 - 100/52   11/02/18 0000 98.3  F (36.8  C) Axillary - 22 72 99/50   11/02/18 0400 99.2  F (37.3  C) Axillary - 22 73 98/49   11/02/18 0800 98.8  F (37.1  C) Axillary - 20 98 106/57   11/02/18 1200 98.9  F (37.2  C) Axillary - 20 96 113/67   11/02/18 1534 99.5  F (37.5  C) Axillary - 22 66 115/75   11/02/18 1610 98.9  F (37.2  C) Axillary 61 20 - 118/75   11/02/18 1630 98.7  F (37.1  C) Axillary 67 20 - 120/74   11/02/18 1700 98.9  F (37.2  C) Axillary - 18 53 130/84   11/02/18 1810 - Axillary 84 - - (!) 169/112   11/02/18 1840 - - - - - (!) 161/113   11/02/18 1900 98.3  F (36.8  C) Axillary - 20 88 124/75        BMT INFUSION DOCUMENTATION (last 24 hours)      BMT/Cellular Product Infusion       11/02/18 1400             Cell Therapy Documentation    Product Release Date 11/02/18  -AD       Recipient Study ID N/A  -AD       Donor Allogeneic - Related  -AD       Donor MRN/ID [id number]  -AD       Donor ABO/Rh O neg  -AD       Allogeneic Donor Eligibility Determination and Summary of Records Eligible  -AD       Type of Infusion Allogeneic  -AD       Total Volume Dispensed (mL) 956  -AD       Total NC Dose 4.08E+08  -AD       Total CD34 Dose N/A  -AD       Total CD3 dose N/A  -AD       Total NC Dose Left in Storage NONE  -AD        Comments for Product Issues N/A  -AD       Donor ABO/Rh        Product Types        Product Numbers        Product Types and Numbers        Volume        ABO Mismatch        ZZTotal NC Dose        ZZTotal CD34 Dose        ZZTotal NC Dose Left in Storage        [REMOVED] Product 11/02/18 1455 HPC, Marrow    Product Details Product Release Date: 11/02/18 -AD Product Release Time: 1455  -JL Product Type: HPC, Marrow  -AD DIN: [id number]  -AD Product Description Code: [id number]  -AD Volume Dispensed (mL): 956 mL  -AD Completion Date (RN to complete): 11/02/18  -CN Completion Time (RN to complete): 1808  -CN    Checked by (Patient RN) Angeli Pelayo RN  -CN       Checked by (Witness) Siomara Callahan RN  -CLARITZA       Product Volume Infused (mL) 956 mL  -CN       Flush Volume (mL) 50 mL  -CN       Volume Dispensed (mL)        RN Documentation    Patient was premedicated as ordered yes  -CN       Line Type central line, right  -CN       Patient Stable Prior to Infusion yes  -CN       Time Infusion Started 1558  -CN       Checked by (Patient RN)        Checked by (RN 2)        Broken Bag?        Immediate suspected transfusion reaction to the product        Time Infusion Stopped        Total Flush Volume (mL)        Checked by (Witness)        Date Infusion Started        Date Infusion Stopped        Volume Infused (mL)        Total Volume Infused (cc)        Patient tolerance of product infusion    Immediate suspected transfusion reaction to the product hypertension  -CN       Time of reaction 1810  -CN       Did patient have prior history of similar signs/symptoms during this hospitalization? no  -CN       Symptoms during/after infusion hypertension  -CN       Did the patient tolerate the infusion well yes  -CN       Medications and treatment for symptoms scheduled mid-lasix, hydralazine  -CN       Did the symptoms resolve?        Enter comments if clots, leaks, broken bag, infusion delays, other issues with bag/infusion  none noted  -CN       Describe symptoms          User Key  (r) = Recorded By, (t) = Taken By, (c) = Cosigned By    Initials Name Effective Dates    [place] CAO Do 04/29/14 -     Angeli Gage, TYSHAWN 06/15/17 -     Siomara Serna RN 04/29/14 -     Siomara Kovacs 04/29/14 -             Post-Infusion Evaluation:   Infusion Related Reaction: Grade 0 - none  Dyspnea: Grade 0 - none  Hypoxia: Grade 0 - not present  Fever: Grade 0 - afebrile  Chills: Grade 0 - none  Febrile Neutropenia: Grade 0 - not present  Sinus Bradycardia: Grade 0 - none  Hypertension: Grade 3 - stage 2 hypertension (systolic BP >/ 160 mm Hg or diastolic BP >/ 100 mm Hg); medical intervention indicated; more than one drug or more intensive therapy than previously used indicated  Hypotension: Grade 0 - none  Chest Pain: Grade 0 - none  Bronchospasm: Grade 0 - none  Pain: Grade 0 - none  Rash: Grade 0 - None  Neurologic Specify: headache    If this was a cord blood transplant, was more than one cord blood unit infused? NA     Jazmín Cabezas MD

## 2018-11-02 NOTE — PLAN OF CARE
Problem: Patient Care Overview  Goal: Plan of Care/Patient Progress Review  Outcome: No Change  AVSS. Lung sounds clear. No reports of pain or nausea. Good UO. Hemoglobin at 7.4 but RBC withheld in anticipation of transplant today. Patient appeared to sleep comfortably throughout the night. Hourly rounding completed. No family at bedside. Continue to follow POC.

## 2018-11-02 NOTE — PROCEDURES
GLEN HAMMER        [facility] Rec #: [id number]     Total Body Irradiation On-Treatment Physician Note  November 1, 2018     Diagnosis: C91.01 Acute lymphoblastic leukemia, in remission     Treatment Summary:  Radiation Oncology - Course: 1 Protocol: 2015-29  Treatment Site Current Dose Modality From To Elapsed Days Fx.  1 TBI  1,155 cGy 18X or 25X 10/29/2018 11/01/2018   3  7  1 Testicular Bst    400 cGy e12 10/30/2018 10/30/2018   1                Under my direction the TBI fraction was delivered after the set up parameters were   checked in the treatment position in the treatment room.  The chart and set up information   were reviewed.  The patient's questions were answered.      Nausea is not very  well controlled.  He is on kytril and Ativan, but still nauseated and   vomited during the night      Objective:  Minimal skin changes seen       Assessment:    The patient is tolerating TBI , but nausea is an issue.       Plan:       Continue TBI . Done later today.         Ester Early M.D.

## 2018-11-03 LAB
ALBUMIN SERPL-MCNC: 3.2 G/DL (ref 3.4–5)
ALP SERPL-CCNC: 24 U/L (ref 40–150)
ALT SERPL W P-5'-P-CCNC: 19 U/L (ref 0–70)
ANION GAP SERPL CALCULATED.3IONS-SCNC: 5 MMOL/L (ref 3–14)
AST SERPL W P-5'-P-CCNC: 23 U/L (ref 0–45)
BASOPHILS # BLD AUTO: 0 10E9/L (ref 0–0.2)
BASOPHILS NFR BLD AUTO: 0.5 %
BILIRUB SERPL-MCNC: 1.4 MG/DL (ref 0.2–1.3)
BUN SERPL-MCNC: 7 MG/DL (ref 7–30)
CALCIUM SERPL-MCNC: 8.4 MG/DL (ref 8.5–10.1)
CHLORIDE SERPL-SCNC: 110 MMOL/L (ref 94–109)
CO2 SERPL-SCNC: 27 MMOL/L (ref 20–32)
CREAT SERPL-MCNC: 0.8 MG/DL (ref 0.66–1.25)
DIFFERENTIAL METHOD BLD: ABNORMAL
EOSINOPHIL # BLD AUTO: 0.1 10E9/L (ref 0–0.7)
EOSINOPHIL NFR BLD AUTO: 1.9 %
ERYTHROCYTE [DISTWIDTH] IN BLOOD BY AUTOMATED COUNT: 13.9 % (ref 10–15)
GFR SERPL CREATININE-BSD FRML MDRD: >90 ML/MIN/1.7M2
GLUCOSE SERPL-MCNC: 98 MG/DL (ref 70–99)
HCT VFR BLD AUTO: 34.3 % (ref 40–53)
HGB BLD-MCNC: 11.7 G/DL (ref 13.3–17.7)
IMM GRANULOCYTES # BLD: 0.1 10E9/L (ref 0–0.4)
IMM GRANULOCYTES NFR BLD: 1.4 %
LYMPHOCYTES # BLD AUTO: 0.1 10E9/L (ref 0.8–5.3)
LYMPHOCYTES NFR BLD AUTO: 1.4 %
MCH RBC QN AUTO: 29.3 PG (ref 26.5–33)
MCHC RBC AUTO-ENTMCNC: 34.1 G/DL (ref 31.5–36.5)
MCV RBC AUTO: 86 FL (ref 78–100)
MONOCYTES # BLD AUTO: 0.1 10E9/L (ref 0–1.3)
MONOCYTES NFR BLD AUTO: 1.2 %
NEUTROPHILS # BLD AUTO: 4 10E9/L (ref 1.6–8.3)
NEUTROPHILS NFR BLD AUTO: 93.6 %
NRBC # BLD AUTO: 0 10*3/UL
NRBC BLD AUTO-RTO: 0 /100
PLATELET # BLD AUTO: 127 10E9/L (ref 150–450)
POTASSIUM SERPL-SCNC: 4.3 MMOL/L (ref 3.4–5.3)
PROT SERPL-MCNC: 6.2 G/DL (ref 6.8–8.8)
RBC # BLD AUTO: 4 10E12/L (ref 4.4–5.9)
SODIUM SERPL-SCNC: 142 MMOL/L (ref 133–144)
WBC # BLD AUTO: 4.3 10E9/L (ref 4–11)

## 2018-11-03 PROCEDURE — C9399 UNCLASSIFIED DRUGS OR BIOLOG: HCPCS | Performed by: PEDIATRICS

## 2018-11-03 PROCEDURE — 25000128 H RX IP 250 OP 636: Performed by: PEDIATRICS

## 2018-11-03 PROCEDURE — 25000132 ZZH RX MED GY IP 250 OP 250 PS 637: Performed by: PEDIATRICS

## 2018-11-03 PROCEDURE — 25000132 ZZH RX MED GY IP 250 OP 250 PS 637: Performed by: NURSE PRACTITIONER

## 2018-11-03 PROCEDURE — 80053 COMPREHEN METABOLIC PANEL: CPT | Performed by: PEDIATRICS

## 2018-11-03 PROCEDURE — 85025 COMPLETE CBC W/AUTO DIFF WBC: CPT | Performed by: NURSE PRACTITIONER

## 2018-11-03 PROCEDURE — 25000128 H RX IP 250 OP 636: Performed by: NURSE PRACTITIONER

## 2018-11-03 PROCEDURE — 20000002 ZZH R&B BMT INTERMEDIATE

## 2018-11-03 RX ORDER — PROMETHAZINE HYDROCHLORIDE 25 MG/ML
25 INJECTION, SOLUTION INTRAMUSCULAR; INTRAVENOUS EVERY 6 HOURS PRN
Status: DISCONTINUED | OUTPATIENT
Start: 2018-11-03 | End: 2018-11-11

## 2018-11-03 RX ADMIN — GRANISETRON HYDROCHLORIDE 1 MG: 1 INJECTION INTRAVENOUS at 08:43

## 2018-11-03 RX ADMIN — DIPHENHYDRAMINE HYDROCHLORIDE 25 MG: 25 CAPSULE ORAL at 21:56

## 2018-11-03 RX ADMIN — SODIUM CHLORIDE, PRESERVATIVE FREE 2 ML: 5 INJECTION INTRAVENOUS at 17:39

## 2018-11-03 RX ADMIN — Medication 25 MG: at 04:18

## 2018-11-03 RX ADMIN — DEFIBROTIDE SODIUM 406 MG: 80 INJECTION, SOLUTION INTRAVENOUS at 19:21

## 2018-11-03 RX ADMIN — MICAFUNGIN SODIUM 150 MG: 10 INJECTION, POWDER, LYOPHILIZED, FOR SOLUTION INTRAVENOUS at 10:16

## 2018-11-03 RX ADMIN — DEFIBROTIDE SODIUM 406 MG: 80 INJECTION, SOLUTION INTRAVENOUS at 13:08

## 2018-11-03 RX ADMIN — DEFIBROTIDE SODIUM 406 MG: 80 INJECTION, SOLUTION INTRAVENOUS at 06:19

## 2018-11-03 RX ADMIN — Medication 2 G: at 21:51

## 2018-11-03 RX ADMIN — URSODIOL 300 MG: 300 CAPSULE ORAL at 08:44

## 2018-11-03 RX ADMIN — ACYCLOVIR SODIUM 700 MG: 50 INJECTION, SOLUTION INTRAVENOUS at 08:44

## 2018-11-03 RX ADMIN — ACYCLOVIR SODIUM 700 MG: 50 INJECTION, SOLUTION INTRAVENOUS at 00:15

## 2018-11-03 RX ADMIN — URSODIOL 300 MG: 300 CAPSULE ORAL at 14:05

## 2018-11-03 RX ADMIN — GRANISETRON HYDROCHLORIDE 1 MG: 1 INJECTION INTRAVENOUS at 19:21

## 2018-11-03 RX ADMIN — DEFIBROTIDE SODIUM 406 MG: 80 INJECTION, SOLUTION INTRAVENOUS at 00:15

## 2018-11-03 RX ADMIN — URSODIOL 300 MG: 300 CAPSULE ORAL at 19:21

## 2018-11-03 RX ADMIN — SALINE NASAL SPRAY 1 SPRAY: 1.5 SOLUTION NASAL at 00:22

## 2018-11-03 RX ADMIN — Medication 2 G: at 13:08

## 2018-11-03 RX ADMIN — Medication 2 G: at 06:19

## 2018-11-03 RX ADMIN — ACYCLOVIR SODIUM 700 MG: 50 INJECTION, SOLUTION INTRAVENOUS at 16:21

## 2018-11-03 RX ADMIN — PANTOPRAZOLE SODIUM 40 MG: 20 TABLET, DELAYED RELEASE ORAL at 08:44

## 2018-11-03 NOTE — PROGRESS NOTES
D:  Patient received allogeneic transplant for ALL.  I:  Administered transplant with appropriate premeds, provided education to patient and family regarding transplant procedure, what to expect during transplant, and possible side effects and reactions.  A:  Transplant ran over 2hrs. Patient Vitals after transplant were stable with the exception of BP. Pt hypertensive 160-170s/110s. Reports headache.  P:  Hydralazine administered. MD notified. BP decreased to 125/74. Continue to monitor and intervene as needed.

## 2018-11-03 NOTE — PROGRESS NOTES
Pediatric BMT Daily Progress Note    Interval Events: Isael received his transplant yesterday. He had some hypertension (160s/110s) and headache afterwards and so received lasix once and hydralazine once. He continues to experience dry mouth but otherwise feels well. He remains afebrile.    Review of Systems: Pertinent positives include those mentioned in interval events. A complete review of systems was performed and is otherwise negative.      Medications:  Please see MAR    Physical Exam:  Temp:  [98.3  F (36.8  C)-99.7  F (37.6  C)] 99.3  F (37.4  C)  Pulse:  [60-97] 97  Heart Rate:  [53-96] 68  Resp:  [16-22] 16  BP: ()/() 100/57  SpO2:  [97 %-100 %] 97 %  I/O last 3 completed shifts:  In: 4358.3 [P.O.:250; I.V.:3152.3; Blood:956]  Out: 2955 [Urine:2955]    GEN: Lying in bed and awake, no acute distress, answers questions and talkative.  HEENT: Normocephalic, atraumatic, alopecia, sclera anicteric, non-injected, mucous membranes slightly dry, nares patent without discharge  CARD: Heart regular rate and rhythm. No murmurs, rubs or gallops.  Cap refill 2 seconds.  RESP: Lungs clear to auscultation. No increased work of breathing, crackles or wheezes.   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 He is currently BMT Transplant Date: BMT Txp 11/2/2018 (1 day).  He is doing well clinically at this time with some persistent dry mouth.  His nausea is improving.      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  Conditioning per 2015-29: TBI days -4 through -1 followed by matched sibling donor transplant on 11/2        # Risk for GVHD:   - Post-transplant Cytoxan days +3 and +4  - Tacrolimus and MMF to start day +5      FEN/Renal:  # Risk for malnutrition: no history of TPN or enteral nutrition dependence   - Dietician consult upon admission, to follow   - Continue age appropriate diet   - mIVF D5 1/2 NS + 20 KCl @ 100 mL/hr  - plan to initiate TPN after Cytoxan fluids complete      # Risk for electrolyte abnormalities:  - check daily electrolytes. Given cardiac history, K>3.5, Mg>1.8, iCa nml.   - Has K+ in fluids     # Risk for renal dysfunction and fluid overload:  - monitor I/O's and daily weights upon admission       # Risk for aHUS/TA-TMA: surveillance to initiate upon admission   - monitor LDH qMonday  - monitor urine protein/creatinine qTuesday      Pulmonary:  # Risk for pulmonary toxicity secondary to chemotherapy:   - PFTs scheduled for 10/24      # Rhinorrhea: 3 mos history, mild. Sinus CT (10/22) with findings of mucosal thickening without evidence of active infection  - RVP 10/22 resulted negative      # Pneumomediastinum: finding upon work up Chest CT (10/22), asymptomatic. Per radiology, likely benign, perhaps transient.   - Patient instructed to notify care team with any SOB, dyspnea, or remarkable cough      Cardiovascular:  # Aaron-Parkinson-White Syndrome: diagnosed upon syncope in 02/2018. Asymptomatic, no interventions to date. Work up EKG c/w WPW, sinus bradycardia at 49 bpm. Work up ECHO normal with EF 68%.   Cardiology visit with Dr. Plummer (10/22): Holter revealed 10 PACs with no evidence of AV block. Normal Echocardiogram with EF 68%. Isael has hx of HR in high 30s with sleep.  - Avoid catecholinergics as able to avoid tachycardia  - Maintain normal iCa+, mag > 1.8, K > 3.5         Heme:   # Pancytopenia secondary to chemotherapy: hx of one platelet transfusion, well tolerated  - Transfuse for hemoglobin < 8, platelets < 30,000 (Defibrotide)  - No previous premedications, monitor for need  - GCSF  to start day +5 until ANC >1500 x3        Infectious Disease:  # Risk for infection given immunocompromised status:   Active: afebrile overnight  - continue Cefepime through engraftment  - monitor blood cultures    ID history: 3 mos hx of mild rhinorrhea. Chest/sinus CT without evidence of infection. 10/22 RVP negative.      Prophylaxis:                                                                                                                        - viral prophylaxis: CMV neg (donor CMV positive), HSV positive. Acyclovir  - fungal prophylaxis: high risk- Micafungin    - bacterial prophylaxis: Cefepime through engraftment      Past infections: none significant       GI:   # Nausea management:   - Scheduled medications: Kytril Q12H, Switched phenergan to Q6H PRN today.   - PRN medications: Ativan, Benadryl, marinol  - avoiding scop patch for now due to xerostomia      # Risk for VOD: especially high risk given previous Inotuzumab therapy.   - Ursodiol TID (10 ml/kg)  - Defibrotide ppx      Neuro:  # Mucositis/pain (anticipated):   - Morphine 2-4 mg Q 3H PRN  - Integrative therapy       The above plan of care was developed by and communicated to me by the   Pediatric BMT attending physician, Dr. Viky Zavala.      Edd Jones MD  [facility]     BMT Attending Note:     Artemio was seen and evaluated by me today.     The significant interval history includes tolerated transplant but was hypertensive to 160/100.  Still has a dry mouth.      I have reviewed changes and data from the last 24 hours, including medications, laboratory results, vital signs, radiograph results, consultant recommendations and pathology results.      I have formulated and discussed the plan with the BMT team. I discussed the course and plan with the patient/family and answered all of their questions to the best of my ability. I counseled them regarding the followin yo with VHR B cell ALL here for matched sibling  transplant with TBI and post transplant cytoxan, at risk for nausea and vomiting- kytril scheduled and will change phenergan to PRN and ativan PRN, at risk for VOD- defibrotide and ursodiol, at risk for malnutrition- will follow closely- on IVF, at risk of opportunistic infection- now afebrile on cefepime and  prophylaxis acyclovir and hannah, history of WPW- keep electrolytes normal and avoid catecholinergics.  Pain- morphine PRN.      My care coordination activities today include oversight of planned lab studies, oversight of planned radiographic studies, oversight of medication changes, discussion with BMT team-members and discussion with consultants.     My total floor time today was at least 35 minutes, greater than 50% of which was counseling and coordination of care.        Viky Zavala MD, PhD    Pediatric Blood and Marrow Transplant  [facility]            Patient Active Problem List   Diagnosis     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)     Aaron-Parkinson-White (WPW) syndrome     Bone marrow transplant candidate     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)     At risk for infection

## 2018-11-03 NOTE — PLAN OF CARE
Problem: Patient Care Overview  Goal: Plan of Care/Patient Progress Review  Outcome: No Change    Afebrile, hypertensive with transplant, otherwise VSS this evening. See transplant note. Reported headache with hypertension, no further reports of pain this evening. He had one emesis, scheduled antiemetics given with relief. Voiding well. Family bedside during transplant. Hourly rounding completed.

## 2018-11-03 NOTE — PROGRESS NOTES
BMT Graft/Cell Infusion Note    Assessment:   Type: Allogeneic  Diagnosis: ALL  Indication for Infusion: reconstitution of hematopoiesis (transplant graft)  Reviewed and Confirmed for the Infusion: consent previously obtained, was present for the start of the infusion and was available in the facility during the infusion  Donor: BM  Product Characteristics, Cell Dose and Volume: as described on the infusion form ([id number]).  Patient was Premedicated as Ordered: Yes  Complications: other (comment)- please see additional progress note.     Viky Zavala MD, PhD    Pediatric Blood and Marrow Transplant  [facility]

## 2018-11-03 NOTE — PLAN OF CARE
Problem: Patient Care Overview  Goal: Plan of Care/Patient Progress Review  Outcome: No Change    Afebrile, VSS, lungs clear. No complaints of pain or nausea. Pt resting comfortably. Hourly rounding completed.

## 2018-11-03 NOTE — PLAN OF CARE
Problem: Stem Cell/Bone Marrow Transplant (Adult)  Goal: Signs and Symptoms of Listed Potential Problems Will be Absent, Minimized or Managed (Stem Cell/Bone Marrow Transplant)  Signs and symptoms of listed potential problems will be absent, minimized or managed by discharge/transition of care (reference Stem Cell/Bone Marrow Transplant (Adult) CPG).   Outcome: No Change  Isael was afebrile. VSS. LS clear on room air. No complaints of pain. Emesis x1. No stool. Good UOP. No PRNs or replacements given. Up and playing games with family most of the day. Hourly rounding complete. Will notify MD of changes. Continue with POC.

## 2018-11-04 LAB
ABO + RH BLD: NORMAL
ABO + RH BLD: NORMAL
ANION GAP SERPL CALCULATED.3IONS-SCNC: 4 MMOL/L (ref 3–14)
BASOPHILS # BLD AUTO: 0 10E9/L (ref 0–0.2)
BASOPHILS NFR BLD AUTO: 0 %
BLD GP AB SCN SERPL QL: NORMAL
BLOOD BANK CMNT PATIENT-IMP: NORMAL
BUN SERPL-MCNC: 10 MG/DL (ref 7–30)
CALCIUM SERPL-MCNC: 8.6 MG/DL (ref 8.5–10.1)
CHLORIDE SERPL-SCNC: 107 MMOL/L (ref 94–109)
CO2 SERPL-SCNC: 29 MMOL/L (ref 20–32)
CREAT SERPL-MCNC: 0.7 MG/DL (ref 0.66–1.25)
DIFFERENTIAL METHOD BLD: ABNORMAL
ELLIPTOCYTES BLD QL SMEAR: SLIGHT
EOSINOPHIL # BLD AUTO: 0 10E9/L (ref 0–0.7)
EOSINOPHIL NFR BLD AUTO: 0.9 %
ERYTHROCYTE [DISTWIDTH] IN BLOOD BY AUTOMATED COUNT: 13.8 % (ref 10–15)
GFR SERPL CREATININE-BSD FRML MDRD: >90 ML/MIN/1.7M2
GLUCOSE SERPL-MCNC: 120 MG/DL (ref 70–99)
HCT VFR BLD AUTO: 34.3 % (ref 40–53)
HGB BLD-MCNC: 11.6 G/DL (ref 13.3–17.7)
LYMPHOCYTES # BLD AUTO: 0.1 10E9/L (ref 0.8–5.3)
LYMPHOCYTES NFR BLD AUTO: 2.7 %
MCH RBC QN AUTO: 29.2 PG (ref 26.5–33)
MCHC RBC AUTO-ENTMCNC: 33.8 G/DL (ref 31.5–36.5)
MCV RBC AUTO: 86 FL (ref 78–100)
MONOCYTES # BLD AUTO: 0 10E9/L (ref 0–1.3)
MONOCYTES NFR BLD AUTO: 0 %
NEUTROPHILS # BLD AUTO: 1.8 10E9/L (ref 1.6–8.3)
NEUTROPHILS NFR BLD AUTO: 96.4 %
PLATELET # BLD AUTO: 112 10E9/L (ref 150–450)
PLATELET # BLD EST: ABNORMAL 10*3/UL
POIKILOCYTOSIS BLD QL SMEAR: SLIGHT
POTASSIUM SERPL-SCNC: 3.9 MMOL/L (ref 3.4–5.3)
RBC # BLD AUTO: 3.97 10E12/L (ref 4.4–5.9)
SODIUM SERPL-SCNC: 140 MMOL/L (ref 133–144)
SPECIMEN EXP DATE BLD: NORMAL
WBC # BLD AUTO: 1.9 10E9/L (ref 4–11)

## 2018-11-04 PROCEDURE — 25000132 ZZH RX MED GY IP 250 OP 250 PS 637: Performed by: NURSE PRACTITIONER

## 2018-11-04 PROCEDURE — 86901 BLOOD TYPING SEROLOGIC RH(D): CPT | Performed by: NURSE PRACTITIONER

## 2018-11-04 PROCEDURE — 86850 RBC ANTIBODY SCREEN: CPT | Performed by: NURSE PRACTITIONER

## 2018-11-04 PROCEDURE — C9399 UNCLASSIFIED DRUGS OR BIOLOG: HCPCS | Performed by: PEDIATRICS

## 2018-11-04 PROCEDURE — 25000128 H RX IP 250 OP 636: Performed by: NURSE PRACTITIONER

## 2018-11-04 PROCEDURE — 25000128 H RX IP 250 OP 636: Performed by: PEDIATRICS

## 2018-11-04 PROCEDURE — 86900 BLOOD TYPING SEROLOGIC ABO: CPT | Performed by: NURSE PRACTITIONER

## 2018-11-04 PROCEDURE — 85025 COMPLETE CBC W/AUTO DIFF WBC: CPT | Performed by: NURSE PRACTITIONER

## 2018-11-04 PROCEDURE — 80048 BASIC METABOLIC PNL TOTAL CA: CPT | Performed by: NURSE PRACTITIONER

## 2018-11-04 PROCEDURE — 25800025 ZZH RX 258: Performed by: PEDIATRICS

## 2018-11-04 PROCEDURE — 20000002 ZZH R&B BMT INTERMEDIATE

## 2018-11-04 RX ORDER — LORAZEPAM 2 MG/ML
1 INJECTION INTRAMUSCULAR EVERY 6 HOURS PRN
Status: DISCONTINUED | OUTPATIENT
Start: 2018-11-04 | End: 2018-11-14

## 2018-11-04 RX ADMIN — DEFIBROTIDE SODIUM 406 MG: 80 INJECTION, SOLUTION INTRAVENOUS at 12:00

## 2018-11-04 RX ADMIN — DEFIBROTIDE SODIUM 406 MG: 80 INJECTION, SOLUTION INTRAVENOUS at 06:03

## 2018-11-04 RX ADMIN — Medication 2 G: at 13:55

## 2018-11-04 RX ADMIN — DEFIBROTIDE SODIUM 406 MG: 80 INJECTION, SOLUTION INTRAVENOUS at 00:02

## 2018-11-04 RX ADMIN — Medication 2 G: at 21:51

## 2018-11-04 RX ADMIN — URSODIOL 300 MG: 300 CAPSULE ORAL at 19:21

## 2018-11-04 RX ADMIN — URSODIOL 300 MG: 300 CAPSULE ORAL at 13:55

## 2018-11-04 RX ADMIN — GRANISETRON HYDROCHLORIDE 1 MG: 1 INJECTION INTRAVENOUS at 19:21

## 2018-11-04 RX ADMIN — PANTOPRAZOLE SODIUM 40 MG: 20 TABLET, DELAYED RELEASE ORAL at 08:10

## 2018-11-04 RX ADMIN — ACYCLOVIR SODIUM 700 MG: 50 INJECTION, SOLUTION INTRAVENOUS at 00:01

## 2018-11-04 RX ADMIN — Medication 2 G: at 05:04

## 2018-11-04 RX ADMIN — ACYCLOVIR SODIUM 700 MG: 50 INJECTION, SOLUTION INTRAVENOUS at 08:10

## 2018-11-04 RX ADMIN — GRANISETRON HYDROCHLORIDE 1 MG: 1 INJECTION INTRAVENOUS at 08:10

## 2018-11-04 RX ADMIN — DEFIBROTIDE SODIUM 406 MG: 80 INJECTION, SOLUTION INTRAVENOUS at 19:21

## 2018-11-04 RX ADMIN — DEXTROSE AND SODIUM CHLORIDE 1000 ML: 5; 450 INJECTION, SOLUTION INTRAVENOUS at 21:52

## 2018-11-04 RX ADMIN — ACYCLOVIR SODIUM 700 MG: 50 INJECTION, SOLUTION INTRAVENOUS at 14:00

## 2018-11-04 RX ADMIN — URSODIOL 300 MG: 300 CAPSULE ORAL at 08:10

## 2018-11-04 RX ADMIN — MICAFUNGIN SODIUM 150 MG: 10 INJECTION, POWDER, LYOPHILIZED, FOR SOLUTION INTRAVENOUS at 09:43

## 2018-11-04 NOTE — PLAN OF CARE
Problem: Patient Care Overview  Goal: Plan of Care/Patient Progress Review  Outcome: No Change  AVSS. Lungs clear. Reported nausea and received benadryl x 1 with relief. No pain reported. Hourly rounding completed.

## 2018-11-04 NOTE — PROGRESS NOTES
Pediatric BMT Daily Progress Note    Interval Events: Isael is day +2, the remains afebrile.  He is continuing to try to eat, although with declining oral intake.Continue with dry eyes and dry mouth.  Does not report concerns of nausea or pain today.    Review of Systems: Pertinent positives include those mentioned in interval events. A complete review of systems was performed and is otherwise negative.      Medications:  Please see MAR    Physical Exam:  Temp:  [97.7  F (36.5  C)-99.4  F (37.4  C)] 98.6  F (37  C)  Pulse:  [52-63] 52  Heart Rate:  [59] 59  Resp:  [16-20] 16  BP: ()/(54-85) 122/80  SpO2:  [97 %-99 %] 99 %  I/O last 3 completed shifts:  In: 3786.9 [P.O.:750; I.V.:3036.9]  Out: 3714 [Urine:3714]    GEN: Lying in bed and awake, no acute distress, answers questions and talkative.  HEENT: Normocephalic, atraumatic, alopecia, sclera anicteric, non-injected, mucous membranes slightly dry, nares patent without discharge, eyes  CARD: Heart regular rate and rhythm. No murmurs, rubs or gallops.  Cap refill 2 seconds.  RESP: Lungs clear to auscultation. No increased work of breathing, crackles or wheezes.   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 He is currently BMT Transplant Date: BMT Txp 11/2/2018 (2 days).  He is doing well clinically at this time with some persistent dry mouth.  His nausea is improving.      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  Conditioning per 2015-29: TBI days -4 through -1 followed by matched sibling donor transplant on 11/2       # Risk for GVHD:   - Post-transplant  Cytoxan days +3 and +4.  Pre-Cytoxan flush to initiate at 2200  - Tacrolimus and MMF to start day +5      FEN/Renal:  # Risk for malnutrition: no history of TPN or enteral nutrition dependence   - Dietician consult upon admission, to follow   - Continue age appropriate diet   - mIVF D5 1/2 NS + 20 KCl @ 100 mL/hr, transitions to  to pre-Cytoxan flush tonight  - plan to initiate TPN after Cytoxan fluids complete      # Risk for electrolyte abnormalities:  - check daily electrolytes. Given cardiac history, K>3.5, Mg>1.8, iCa nml.   - Has K+ in fluids, 11/4 potassium 3.9     # Risk for renal dysfunction and fluid overload:  - monitor I/O's and daily weights upon admission       # Risk for aHUS/TA-TMA: surveillance to initiate upon admission   - monitor LDH qMonday  - monitor urine protein/creatinine qTuesday      Pulmonary:  # Risk for pulmonary toxicity secondary to chemotherapy:         # Rhinorrhea: 3 mos history, mild. Sinus CT (10/22) with findings of mucosal thickening without evidence of active infection  - RVP 10/22 resulted negative      # Pneumomediastinum: finding upon work up Chest CT (10/22), asymptomatic. Per radiology, likely benign, perhaps transient.   - Patient instructed to notify care team with any SOB, dyspnea, or remarkable cough      Cardiovascular:  # Aaron-Parkinson-White Syndrome: diagnosed upon syncope in 02/2018. Asymptomatic, no interventions to date. Work up EKG c/w WPW, sinus bradycardia at 49 bpm. Work up ECHO normal with EF 68%.   Cardiology visit with Dr. Plummer (10/22): Holter revealed 10 PACs with no evidence of AV block. Normal Echocardiogram with EF 68%. Isael has hx of HR in high 30s with sleep.  - Avoid catecholinergics as able to avoid tachycardia  - Maintain normal iCa+, mag > 1.8, K > 3.5         Heme:   # Pancytopenia secondary to chemotherapy: hx of one platelet transfusion, well tolerated  - Transfuse for hemoglobin < 8, platelets < 30,000 (Defibrotide)  - No previous  premedications, monitor for need  - GCSF to start day +5 until ANC >1500 x3        Infectious Disease:  # Risk for infection given immunocompromised status:   Active: afebrile overnight  - continue Cefepime through engraftment  - monitor blood cultures-no growth to date    ID history: 3 mos hx of mild rhinorrhea. Chest/sinus CT without evidence of infection. 10/22 RVP negative.      Prophylaxis:                                                                                                                        - viral prophylaxis: CMV neg (donor CMV positive), HSV positive. Acyclovir  - fungal prophylaxis: high risk- Micafungin    - bacterial prophylaxis: Cefepime through engraftment      Past infections: none significant       GI:   # Nausea management:   - Scheduled medications: Kytril Q12H, Switched phenergan to Q6H PRN today.   - PRN medications: Ativan, Benadryl, marinol  - avoiding scop patch for now due to xerostomia      # Risk for VOD: especially high risk given previous Inotuzumab therapy.   - Ursodiol TID (10 ml/kg)  - Defibrotide ppx      Neuro:  # Mucositis/pain (anticipated):   - Morphine 2-4 mg Q 3H PRN  - Integrative therapy       The above plan of care was developed by and communicated to me by the   Pediatric BMT attending physician, Dr. Viky Zavala.      Lilia Prabhakar MSN, CPNP-  Pediatric Blood and Marrow Transplant Program  [facility] [phone]  Pager [phone]     BMT Attending Note:     Artemio was seen and evaluated by me today.     The significant interval history includes doing well. Ate some hashbrowns this am.  Did well discontinuing scheduled phenergan yesterday.     I have reviewed changes and data from the last 24 hours, including medications, laboratory results, vital signs, radiograph results, consultant recommendations and pathology results.      I have formulated and discussed the plan with the BMT team. I discussed the course and plan with the patient/family and  answered all of their questions to the best of my ability. I counseled them regarding the followin yo with VHR B cell ALL here for matched sibling transplant with TBI and post transplant cytoxan, at risk for nausea and vomiting- kytril scheduled and continue phenergan and ativan PRN, at risk for VOD- defibrotide and ursodiol, at risk for malnutrition- will follow closely- on IVF, at risk of opportunistic infection- now afebrile on cefepime and  prophylaxis acyclovir and hannah, history of WPW- keep electrolytes normal and avoid catecholinergics.  Pain- morphine PRN.   We will plan on starting TPN if needed after cytoxan.      My care coordination activities today include oversight of planned lab studies, oversight of planned radiographic studies, oversight of medication changes, discussion with BMT team-members and discussion with consultants.     My total floor time today was at least 35 minutes, greater than 50% of which was counseling and coordination of care.        Viky Zavala MD, PhD    Pediatric Blood and Marrow Transplant  [facility]              Patient Active Problem List   Diagnosis     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)     Aaron-Parkinson-White (WPW) syndrome     Bone marrow transplant candidate     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)     At risk for infection

## 2018-11-04 NOTE — PLAN OF CARE
Problem: Stem Cell/Bone Marrow Transplant (Adult)  Goal: Signs and Symptoms of Listed Potential Problems Will be Absent, Minimized or Managed (Stem Cell/Bone Marrow Transplant)  Signs and symptoms of listed potential problems will be absent, minimized or managed by discharge/transition of care (reference Stem Cell/Bone Marrow Transplant (Adult) CPG).   Outcome: No Change  Patient Afebrile. VSS and within parameter.  LSC, O2 sats adequate on RA. Good urine output. No stool. No C/o of nausea or occurrence of emesis. No C/o of pain. No PRNs used and no replacements needed overnight. Patient seemed to sleep well throughout the night. Family not present at bedside. Hourly rounding completed. Continue to monitor.

## 2018-11-04 NOTE — PLAN OF CARE
Problem: Patient Care Overview  Goal: Plan of Care/Patient Progress Review  Outcome: No Change  Isael was afebrile. VSS. LS clear on room air. No complaints of nausea. No emesis. Pt reports dry mouth is improving. Tolerating small amounts of food. Good UOP. Pt was awake and playing video games out of bed today. No family at bedside. Hourly rounding complete. Will notify MD of changes. Continue with POC.

## 2018-11-05 ENCOUNTER — HOME INFUSION (PRE-WILLOW HOME INFUSION) (OUTPATIENT)
Dept: PHARMACY | Facility: CLINIC | Age: 18
End: 2018-11-05

## 2018-11-05 ENCOUNTER — APPOINTMENT (OUTPATIENT)
Dept: PHYSICAL THERAPY | Facility: CLINIC | Age: 18
End: 2018-11-05
Attending: PEDIATRICS
Payer: COMMERCIAL

## 2018-11-05 ENCOUNTER — ONCOLOGY VISIT (OUTPATIENT)
Dept: RADIATION ONCOLOGY | Facility: CLINIC | Age: 18
End: 2018-11-05

## 2018-11-05 LAB
ACANTHOCYTES BLD QL SMEAR: ABNORMAL
ALBUMIN SERPL-MCNC: 3.5 G/DL (ref 3.4–5)
ALP SERPL-CCNC: 25 U/L (ref 40–150)
ALT SERPL W P-5'-P-CCNC: 16 U/L (ref 0–70)
ANION GAP SERPL CALCULATED.3IONS-SCNC: 6 MMOL/L (ref 3–14)
AST SERPL W P-5'-P-CCNC: 20 U/L (ref 0–45)
BASOPHILS # BLD AUTO: 0 10E9/L (ref 0–0.2)
BASOPHILS NFR BLD AUTO: 0.9 %
BILIRUB DIRECT SERPL-MCNC: 0.3 MG/DL (ref 0–0.2)
BILIRUB SERPL-MCNC: 1.3 MG/DL (ref 0.2–1.3)
BUN SERPL-MCNC: 7 MG/DL (ref 7–30)
CA-I BLD-MCNC: 5.1 MG/DL (ref 4.4–5.2)
CALCIUM SERPL-MCNC: 8.9 MG/DL (ref 8.5–10.1)
CHLORIDE SERPL-SCNC: 107 MMOL/L (ref 94–109)
CMV DNA SPEC NAA+PROBE-ACNC: NORMAL [IU]/ML
CMV DNA SPEC NAA+PROBE-ACNC: NORMAL [IU]/ML
CMV DNA SPEC NAA+PROBE-LOG#: NORMAL {LOG_IU}/ML
CMV DNA SPEC NAA+PROBE-LOG#: NORMAL {LOG_IU}/ML
CO2 SERPL-SCNC: 26 MMOL/L (ref 20–32)
COPATH REPORT: NORMAL
CREAT SERPL-MCNC: 0.63 MG/DL (ref 0.66–1.25)
DIFFERENTIAL METHOD BLD: ABNORMAL
EOSINOPHIL # BLD AUTO: 0.1 10E9/L (ref 0–0.7)
EOSINOPHIL NFR BLD AUTO: 8.8 %
ERYTHROCYTE [DISTWIDTH] IN BLOOD BY AUTOMATED COUNT: 13.6 % (ref 10–15)
FIBRINOGEN PPP-MCNC: 463 MG/DL (ref 200–420)
GFR SERPL CREATININE-BSD FRML MDRD: >90 ML/MIN/1.7M2
GLUCOSE SERPL-MCNC: 113 MG/DL (ref 70–99)
HCT VFR BLD AUTO: 35.9 % (ref 40–53)
HGB BLD-MCNC: 11.9 G/DL (ref 13.3–17.7)
INR PPP: 1.04 (ref 0.86–1.14)
LYMPHOCYTES # BLD AUTO: 0 10E9/L (ref 0.8–5.3)
LYMPHOCYTES NFR BLD AUTO: 2.6 %
Lab: NORMAL
MAGNESIUM SERPL-MCNC: 1.9 MG/DL (ref 1.6–2.3)
MCH RBC QN AUTO: 29 PG (ref 26.5–33)
MCHC RBC AUTO-ENTMCNC: 33.1 G/DL (ref 31.5–36.5)
MCV RBC AUTO: 88 FL (ref 78–100)
MONOCYTES # BLD AUTO: 0 10E9/L (ref 0–1.3)
MONOCYTES NFR BLD AUTO: 1.8 %
NEUTROPHILS # BLD AUTO: 0.5 10E9/L (ref 1.6–8.3)
NEUTROPHILS NFR BLD AUTO: 85.9 %
OVALOCYTES BLD QL SMEAR: SLIGHT
PHOSPHATE SERPL-MCNC: 3.9 MG/DL (ref 2.5–4.5)
PLATELET # BLD AUTO: 91 10E9/L (ref 150–450)
PLATELET # BLD EST: ABNORMAL 10*3/UL
POIKILOCYTOSIS BLD QL SMEAR: ABNORMAL
POTASSIUM SERPL-SCNC: 3.5 MMOL/L (ref 3.4–5.3)
POTASSIUM SERPL-SCNC: 3.8 MMOL/L (ref 3.4–5.3)
PROT SERPL-MCNC: 6.8 G/DL (ref 6.8–8.8)
RBC # BLD AUTO: 4.1 10E12/L (ref 4.4–5.9)
SODIUM SERPL-SCNC: 138 MMOL/L (ref 133–144)
SODIUM SERPL-SCNC: 139 MMOL/L (ref 133–144)
SPECIMEN SOURCE: NORMAL
WBC # BLD AUTO: 0.6 10E9/L (ref 4–11)
YEAST SPEC QL CULT: NORMAL

## 2018-11-05 PROCEDURE — 25000128 H RX IP 250 OP 636: Performed by: PEDIATRICS

## 2018-11-05 PROCEDURE — 83735 ASSAY OF MAGNESIUM: CPT | Performed by: NURSE PRACTITIONER

## 2018-11-05 PROCEDURE — 82248 BILIRUBIN DIRECT: CPT | Performed by: NURSE PRACTITIONER

## 2018-11-05 PROCEDURE — 25000128 H RX IP 250 OP 636: Performed by: NURSE PRACTITIONER

## 2018-11-05 PROCEDURE — 84100 ASSAY OF PHOSPHORUS: CPT | Performed by: NURSE PRACTITIONER

## 2018-11-05 PROCEDURE — 82330 ASSAY OF CALCIUM: CPT | Performed by: PEDIATRICS

## 2018-11-05 PROCEDURE — 84132 ASSAY OF SERUM POTASSIUM: CPT | Performed by: PEDIATRICS

## 2018-11-05 PROCEDURE — 25000132 ZZH RX MED GY IP 250 OP 250 PS 637: Performed by: NURSE PRACTITIONER

## 2018-11-05 PROCEDURE — C9399 UNCLASSIFIED DRUGS OR BIOLOG: HCPCS | Performed by: PEDIATRICS

## 2018-11-05 PROCEDURE — 84450 TRANSFERASE (AST) (SGOT): CPT | Performed by: NURSE PRACTITIONER

## 2018-11-05 PROCEDURE — 25800025 ZZH RX 258: Performed by: PEDIATRICS

## 2018-11-05 PROCEDURE — 85610 PROTHROMBIN TIME: CPT | Performed by: NURSE PRACTITIONER

## 2018-11-05 PROCEDURE — 20000002 ZZH R&B BMT INTERMEDIATE

## 2018-11-05 PROCEDURE — 85384 FIBRINOGEN ACTIVITY: CPT | Performed by: NURSE PRACTITIONER

## 2018-11-05 PROCEDURE — 80053 COMPREHEN METABOLIC PANEL: CPT | Performed by: NURSE PRACTITIONER

## 2018-11-05 PROCEDURE — 84295 ASSAY OF SERUM SODIUM: CPT | Performed by: PEDIATRICS

## 2018-11-05 PROCEDURE — 40000918 ZZH STATISTIC PT IP PEDS VISIT

## 2018-11-05 PROCEDURE — 85025 COMPLETE CBC W/AUTO DIFF WBC: CPT | Performed by: NURSE PRACTITIONER

## 2018-11-05 PROCEDURE — 97530 THERAPEUTIC ACTIVITIES: CPT | Mod: GP

## 2018-11-05 PROCEDURE — 87070 CULTURE OTHR SPECIMN AEROBIC: CPT | Performed by: PEDIATRICS

## 2018-11-05 RX ORDER — SODIUM CHLORIDE/ALOE VERA
GEL (GRAM) NASAL 4 TIMES DAILY PRN
Status: DISCONTINUED | OUTPATIENT
Start: 2018-11-05 | End: 2018-11-21

## 2018-11-05 RX ADMIN — DEFIBROTIDE SODIUM 406 MG: 80 INJECTION, SOLUTION INTRAVENOUS at 06:14

## 2018-11-05 RX ADMIN — MICAFUNGIN SODIUM 150 MG: 10 INJECTION, POWDER, LYOPHILIZED, FOR SOLUTION INTRAVENOUS at 09:21

## 2018-11-05 RX ADMIN — URSODIOL 300 MG: 300 CAPSULE ORAL at 08:06

## 2018-11-05 RX ADMIN — MORPHINE SULFATE 2 MG: 2 INJECTION, SOLUTION INTRAMUSCULAR; INTRAVENOUS at 01:22

## 2018-11-05 RX ADMIN — Medication 2 G: at 22:19

## 2018-11-05 RX ADMIN — URSODIOL 300 MG: 300 CAPSULE ORAL at 19:23

## 2018-11-05 RX ADMIN — DEFIBROTIDE SODIUM 406 MG: 80 INJECTION, SOLUTION INTRAVENOUS at 12:43

## 2018-11-05 RX ADMIN — DEXTROSE AND SODIUM CHLORIDE 1000 ML: 5; 450 INJECTION, SOLUTION INTRAVENOUS at 06:31

## 2018-11-05 RX ADMIN — DEXTROSE AND SODIUM CHLORIDE 1000 ML: 5; 450 INJECTION, SOLUTION INTRAVENOUS at 20:30

## 2018-11-05 RX ADMIN — Medication 2 G: at 14:27

## 2018-11-05 RX ADMIN — URSODIOL 300 MG: 300 CAPSULE ORAL at 14:27

## 2018-11-05 RX ADMIN — MESNA 3255 MG: 100 INJECTION, SOLUTION INTRAVENOUS at 08:02

## 2018-11-05 RX ADMIN — CYCLOPHOSPHAMIDE 3255 MG: 2 INJECTION, POWDER, FOR SOLUTION INTRAVENOUS; ORAL at 09:57

## 2018-11-05 RX ADMIN — DEFIBROTIDE SODIUM 406 MG: 80 INJECTION, SOLUTION INTRAVENOUS at 00:13

## 2018-11-05 RX ADMIN — ACYCLOVIR SODIUM 700 MG: 50 INJECTION, SOLUTION INTRAVENOUS at 08:07

## 2018-11-05 RX ADMIN — ACYCLOVIR SODIUM 650 MG: 50 INJECTION, SOLUTION INTRAVENOUS at 14:58

## 2018-11-05 RX ADMIN — DEXTROSE AND SODIUM CHLORIDE 1000 ML: 5; 450 INJECTION, SOLUTION INTRAVENOUS at 06:39

## 2018-11-05 RX ADMIN — GRANISETRON HYDROCHLORIDE 1 MG: 1 INJECTION INTRAVENOUS at 20:24

## 2018-11-05 RX ADMIN — ACYCLOVIR SODIUM 700 MG: 50 INJECTION, SOLUTION INTRAVENOUS at 00:13

## 2018-11-05 RX ADMIN — PANTOPRAZOLE SODIUM 40 MG: 20 TABLET, DELAYED RELEASE ORAL at 08:06

## 2018-11-05 RX ADMIN — Medication 2 G: at 05:44

## 2018-11-05 RX ADMIN — FUROSEMIDE 10 MG: 10 INJECTION, SOLUTION INTRAMUSCULAR; INTRAVENOUS at 18:39

## 2018-11-05 RX ADMIN — DEXTROSE AND SODIUM CHLORIDE 1000 ML: 5; 450 INJECTION, SOLUTION INTRAVENOUS at 20:29

## 2018-11-05 RX ADMIN — GRANISETRON HYDROCHLORIDE 1 MG: 1 INJECTION INTRAVENOUS at 09:21

## 2018-11-05 RX ADMIN — FUROSEMIDE 10 MG: 10 INJECTION, SOLUTION INTRAMUSCULAR; INTRAVENOUS at 12:43

## 2018-11-05 RX ADMIN — DEFIBROTIDE SODIUM 406 MG: 80 INJECTION, SOLUTION INTRAVENOUS at 18:29

## 2018-11-05 RX ADMIN — FUROSEMIDE 10 MG: 10 INJECTION, SOLUTION INTRAMUSCULAR; INTRAVENOUS at 22:26

## 2018-11-05 RX ADMIN — PROMETHAZINE HYDROCHLORIDE 25 MG: 25 INJECTION, SOLUTION INTRAMUSCULAR; INTRAVENOUS at 13:47

## 2018-11-05 NOTE — PROGRESS NOTES
Pediatric BMT Daily Progress Note    Interval Events: Isael remains stable and afebrile. He will receive post-transplant cyclophosphamide today and tomorrow (days +3 and +4).  He is continuing to try to eat, although with declining oral intake. Xerostomia improved after changing Phenergan to PRN.  Moved rooms overnight due to equipment issue and he was upset about the move. Brief nosebleed.  This morning, CVL insertion site noted to have purulent drainage by bedside RN, sent wound culture. No erythema noted.  Review of Systems: Pertinent positives include those mentioned in interval events. A complete review of systems was performed and is otherwise negative.      Medications:  Please see MAR    Physical Exam:  Temp:  [97.7  F (36.5  C)-98.8  F (37.1  C)] 98.1  F (36.7  C)  Pulse:  [56-63] 62  Heart Rate:  [57-61] 61  Resp:  [16-18] 16  BP: ()/(54-80) 102/69  SpO2:  [96 %-100 %] 99 %  I/O last 3 completed shifts:  In: 4526.4 [P.O.:180; I.V.:4346.4]  Out: 3691 [Urine:3691]    GEN: Lying in bed and awake, no acute distress, answers questions and talkative.  HEENT: Normocephalic, atraumatic, alopecia, sclera anicteric, non-injected, mucous membranes slightly dry, nares patent without discharge, eyes  CARD: Heart regular rate and rhythm. No murmurs, rubs or gallops.  Cap refill 2 seconds.  RESP: Lungs clear to auscultation. No increased work of breathing, crackles or wheezes.   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 He is currently BMT Transplant Date: BMT Txp 11/2/2018 (3 days).  He is doing well clinically at this time with some persistent dry mouth.  His nausea is stable.      BMT:  # High risk B cell ALL (CNS  negative) + JAK2 activation/BMT: Latest bone marrow biopsy (10/15) MRD 0.006%; Ruxolitinib continued until admission.  Conditioning per 2015-29: TBI days -4 through -1 followed by matched sibling donor transplant on 11/2   - awaiting count recovery.      # Risk for GVHD:   - Post-transplant Cytoxan days +3 and +4.    - Tacrolimus and MMF to start day +5      FEN/Renal:  # Risk for malnutrition: no history of TPN or enteral nutrition dependence   - Dietician consult upon admission, to follow   - Continue age appropriate diet   - Cytoxan flush (D5 1/2 NS at 220 mL/hr) (no longer on IV fluids with K)  - plan to initiate TPN after Cytoxan fluids complete      # Risk for electrolyte abnormalities:  - check daily electrolytes. Given cardiac history, keep K>3.5, Mg>1.8, iCa nml.       # Risk for renal dysfunction and fluid overload:  - monitor I/O's and daily weights upon admission       # Risk for aHUS/TA-TMA: surveillance to initiate upon admission   - monitor LDH qMonday  - monitor urine protein/creatinine qTuesday      Pulmonary:  # Risk for pulmonary toxicity secondary to chemotherapy:   - monitor respiratory function per unit routine        # Rhinorrhea: 3 mos history, mild. Sinus CT (10/22) with findings of mucosal thickening without evidence of active infection  - RVP 10/22 resulted negative      # Pneumomediastinum: finding upon work up Chest CT (10/22), asymptomatic. Per radiology, likely benign, perhaps transient.   - Patient instructed to notify care team with any SOB, dyspnea, or remarkable cough      Cardiovascular:  # Aaron-Parkinson-White Syndrome: diagnosed upon syncope in 02/2018. Asymptomatic, no interventions to date. Work up EKG c/w WPW, sinus bradycardia at 49 bpm. Work up ECHO normal with EF 68%.   Cardiology visit with Dr. Plummer (10/22): Holter revealed 10 PACs with no evidence of AV block. Normal Echocardiogram with EF 68%. Isael has hx of HR in high 30s with sleep.  - Avoid catecholinergics as  able to avoid tachycardia  - Maintain normal iCa+, mag > 1.8, K > 3.5         Heme:   # Pancytopenia secondary to chemotherapy: hx of one platelet transfusion, well tolerated  - Transfuse for hemoglobin < 8, platelets < 30,000 (Defibrotide)  - No previous premedications, monitor for need  - GCSF to start day +5 until ANC >1500 x3        Infectious Disease:  # Risk for infection given immunocompromised status:   Active: now afebrile, but due to fever during conditioning, will continue Cefepime through engraftment  - monitor blood cultures-no growth to date  - CVL insertion site with purulent drainage but no erythema--sent wound cx 11/5, pending    ID history: 3 mos hx of mild rhinorrhea. Chest/sinus CT without evidence of infection. 10/22 RVP negative.      Prophylaxis:                                                                                                                        - viral prophylaxis: CMV neg (donor CMV positive), HSV positive. Acyclovir  - fungal prophylaxis: high risk- Micafungin    - bacterial prophylaxis: Cefepime through engraftment (had fever during conditioning)      Past infections: none significant       GI:   # Nausea management:   - Scheduled medications: Kytril Q12H,   - PRN medications: Ativan, Benadryl, marinol, Phenergan (changed to PRN due to xerostomia)  - avoiding scop patch for now due to xerostomia      # Risk for VOD: especially high risk given previous Inotuzumab therapy.   - Ursodiol TID (10 ml/kg)  - Defibrotide ppx      ENT:  #epistaxis: very mild, nasal mucosa dry  - Ayr gel PRN    Neuro:  # Mucositis/pain (anticipated):   - Morphine 2-4 mg Q 3H PRN  - Integrative therapy       The above plan of care was developed by and communicated to me by the   Pediatric BMT attending physician, Dr. Viky Zavala.      Khushi Serrano MD  Pediatric BMT Hospitalist        BMT Attending Note:     Artemio was seen and evaluated by me today.     The significant interval history  includes doing well. Ate some hashbrowns this am.  Did well discontinuing scheduled phenergan yesterday.     I have reviewed changes and data from the last 24 hours, including medications, laboratory results, vital signs, radiograph results, consultant recommendations and pathology results.      I have formulated and discussed the plan with the BMT team. I discussed the course and plan with the patient/family and answered all of their questions to the best of my ability. I counseled them regarding the followin yo with VHR B cell ALL here for matched sibling transplant with TBI and post transplant cytoxan, at risk for nausea and vomiting- kytril scheduled and continue phenergan and ativan PRN, at risk for VOD- defibrotide and ursodiol, at risk for malnutrition- will follow closely- on IVF, at risk of opportunistic infection- now afebrile on cefepime, sent CVL site for wound culture and prophylaxis acyclovir and hannah, history of WPW- keep electrolytes normal and avoid catecholinergics.  Pain- morphine PRN.   We will plan on starting TPN if needed after cytoxan.      My care coordination activities today include oversight of planned lab studies, oversight of planned radiographic studies, oversight of medication changes, discussion with BMT team-members and discussion with consultants.     My total floor time today was at least 35 minutes, greater than 50% of which was counseling and coordination of care.        Viky Zavala MD, PhD    Pediatric Blood and Marrow Transplant  [facility]              Patient Active Problem List   Diagnosis     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)     Aaron-Parkinson-White (WPW) syndrome     Bone marrow transplant candidate     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)     At risk for infection

## 2018-11-05 NOTE — PLAN OF CARE
Problem: Stem Cell/Bone Marrow Transplant (Adult)  Goal: Signs and Symptoms of Listed Potential Problems Will be Absent, Minimized or Managed (Stem Cell/Bone Marrow Transplant)  Signs and symptoms of listed potential problems will be absent, minimized or managed by discharge/transition of care (reference Stem Cell/Bone Marrow Transplant (Adult) CPG).   Outcome: No Change  Patient afebrile. VSS and within parameter. LSC. O2 sats adequate on RA. Frequent congested cough overnight. No c/o of nausea. C/o of abdominal pain, PRN oxy X1 with good results. Patient had one occurrence of loose stools, but accidentally flushed down the toilet. Patient c/o of dry nasal passages, small nose bleed overnight resolved without intervention. No replacements overnight. Hourly rounding completed. No family at bedside currently. Continue to monitor.

## 2018-11-05 NOTE — PLAN OF CARE
Problem: Stem Cell/Bone Marrow Transplant (Adult)  Goal: Signs and Symptoms of Listed Potential Problems Will be Absent, Minimized or Managed (Stem Cell/Bone Marrow Transplant)  Signs and symptoms of listed potential problems will be absent, minimized or managed by discharge/transition of care (reference Stem Cell/Bone Marrow Transplant (Adult) CPG).   Outcome: No Change  Isael was afebrile. VSS. LS clear on room air. Emesis x1.  PRN phenergan x1 with good results. Stool x1. Good UOP. Cytoxan given and tolerated well. Mesna gtt continues. No Replacements given. Hourly rounding complete. Will notify MD of changes. Continue with POC.

## 2018-11-05 NOTE — PLAN OF CARE
Problem: Patient Care Overview  Goal: Plan of Care/Patient Progress Review  PT: Focus of AM session included increasing OOB activity and initiating LE strengthening exercises. Patient participated in 45 minutes of OOB activity - reporting minimal fatigue. Provided patient with further education regarding activity for when patient is not seen by PT.

## 2018-11-05 NOTE — PLAN OF CARE
Problem: Patient Care Overview  Goal: Plan of Care/Patient Progress Review  Outcome: No Change    Afebrile, VSS, lungs clear. No complaints of pain or nausea. Pt had one episode of epistaxis which resolved on it's own. Hourly rounding completed.

## 2018-11-06 LAB
ALBUMIN UR-MCNC: NEGATIVE MG/DL
ANION GAP SERPL CALCULATED.3IONS-SCNC: 5 MMOL/L (ref 3–14)
APPEARANCE UR: CLEAR
BACTERIA SPEC CULT: NO GROWTH
BACTERIA SPEC CULT: NO GROWTH
BILIRUB UR QL STRIP: NEGATIVE
BUN SERPL-MCNC: 6 MG/DL (ref 7–30)
CALCIUM SERPL-MCNC: 9 MG/DL (ref 8.5–10.1)
CHLORIDE SERPL-SCNC: 99 MMOL/L (ref 94–109)
CO2 SERPL-SCNC: 31 MMOL/L (ref 20–32)
COLOR UR AUTO: ABNORMAL
CREAT SERPL-MCNC: 0.59 MG/DL (ref 0.66–1.25)
CREAT UR-MCNC: 19 MG/DL
DIFFERENTIAL METHOD BLD: ABNORMAL
ERYTHROCYTE [DISTWIDTH] IN BLOOD BY AUTOMATED COUNT: 13.2 % (ref 10–15)
GFR SERPL CREATININE-BSD FRML MDRD: >90 ML/MIN/1.7M2
GLUCOSE SERPL-MCNC: 104 MG/DL (ref 70–99)
GLUCOSE UR STRIP-MCNC: NEGATIVE MG/DL
HCT VFR BLD AUTO: 33.9 % (ref 40–53)
HGB BLD-MCNC: 11.7 G/DL (ref 13.3–17.7)
HGB UR QL STRIP: NEGATIVE
KETONES UR STRIP-MCNC: 40 MG/DL
LEUKOCYTE ESTERASE UR QL STRIP: NEGATIVE
Lab: NORMAL
Lab: NORMAL
MCH RBC QN AUTO: 29.4 PG (ref 26.5–33)
MCHC RBC AUTO-ENTMCNC: 34.5 G/DL (ref 31.5–36.5)
MCV RBC AUTO: 85 FL (ref 78–100)
MUCOUS THREADS #/AREA URNS LPF: PRESENT /LPF
NITRATE UR QL: NEGATIVE
PH UR STRIP: 6 PH (ref 5–7)
PLATELET # BLD AUTO: 58 10E9/L (ref 150–450)
POTASSIUM SERPL-SCNC: 3.2 MMOL/L (ref 3.4–5.3)
POTASSIUM SERPL-SCNC: 3.3 MMOL/L (ref 3.4–5.3)
POTASSIUM SERPL-SCNC: 3.3 MMOL/L (ref 3.4–5.3)
PROT UR-MCNC: 0.08 G/L
PROT/CREAT 24H UR: 0.44 G/G CR (ref 0–0.2)
RBC # BLD AUTO: 3.98 10E12/L (ref 4.4–5.9)
RBC #/AREA URNS AUTO: <1 /HPF (ref 0–2)
SODIUM SERPL-SCNC: 135 MMOL/L (ref 133–144)
SODIUM SERPL-SCNC: 136 MMOL/L (ref 133–144)
SOURCE: ABNORMAL
SP GR UR STRIP: 1 (ref 1–1.03)
SPECIMEN SOURCE: NORMAL
SPECIMEN SOURCE: NORMAL
UROBILINOGEN UR STRIP-MCNC: NORMAL MG/DL (ref 0–2)
WBC # BLD AUTO: 0.4 10E9/L (ref 4–11)
WBC #/AREA URNS AUTO: 1 /HPF (ref 0–5)

## 2018-11-06 PROCEDURE — 25000128 H RX IP 250 OP 636: Performed by: PEDIATRICS

## 2018-11-06 PROCEDURE — 87102 FUNGUS ISOLATION CULTURE: CPT | Performed by: NURSE PRACTITIONER

## 2018-11-06 PROCEDURE — 85018 HEMOGLOBIN: CPT | Performed by: NURSE PRACTITIONER

## 2018-11-06 PROCEDURE — 25000132 ZZH RX MED GY IP 250 OP 250 PS 637: Performed by: NURSE PRACTITIONER

## 2018-11-06 PROCEDURE — 81001 URINALYSIS AUTO W/SCOPE: CPT | Performed by: NURSE PRACTITIONER

## 2018-11-06 PROCEDURE — 84156 ASSAY OF PROTEIN URINE: CPT | Performed by: NURSE PRACTITIONER

## 2018-11-06 PROCEDURE — 25800025 ZZH RX 258: Performed by: PEDIATRICS

## 2018-11-06 PROCEDURE — 85027 COMPLETE CBC AUTOMATED: CPT

## 2018-11-06 PROCEDURE — C9399 UNCLASSIFIED DRUGS OR BIOLOG: HCPCS | Performed by: PEDIATRICS

## 2018-11-06 PROCEDURE — 84132 ASSAY OF SERUM POTASSIUM: CPT | Performed by: PEDIATRICS

## 2018-11-06 PROCEDURE — 20000002 ZZH R&B BMT INTERMEDIATE

## 2018-11-06 PROCEDURE — 84132 ASSAY OF SERUM POTASSIUM: CPT | Performed by: NURSE PRACTITIONER

## 2018-11-06 PROCEDURE — 84295 ASSAY OF SERUM SODIUM: CPT | Performed by: PEDIATRICS

## 2018-11-06 PROCEDURE — 80048 BASIC METABOLIC PNL TOTAL CA: CPT | Performed by: NURSE PRACTITIONER

## 2018-11-06 RX ADMIN — DEXTROSE AND SODIUM CHLORIDE 1000 ML: 5; 450 INJECTION, SOLUTION INTRAVENOUS at 06:10

## 2018-11-06 RX ADMIN — ACYCLOVIR SODIUM 650 MG: 50 INJECTION, SOLUTION INTRAVENOUS at 00:33

## 2018-11-06 RX ADMIN — FUROSEMIDE 10 MG: 10 INJECTION, SOLUTION INTRAMUSCULAR; INTRAVENOUS at 14:10

## 2018-11-06 RX ADMIN — Medication 2 G: at 23:31

## 2018-11-06 RX ADMIN — URSODIOL 300 MG: 300 CAPSULE ORAL at 20:19

## 2018-11-06 RX ADMIN — PANTOPRAZOLE SODIUM 40 MG: 20 TABLET, DELAYED RELEASE ORAL at 08:11

## 2018-11-06 RX ADMIN — FUROSEMIDE 10 MG: 10 INJECTION, SOLUTION INTRAMUSCULAR; INTRAVENOUS at 17:44

## 2018-11-06 RX ADMIN — PROMETHAZINE HYDROCHLORIDE 25 MG: 25 INJECTION, SOLUTION INTRAMUSCULAR; INTRAVENOUS at 14:34

## 2018-11-06 RX ADMIN — TACROLIMUS 0.03 MG/KG/DAY: 5 INJECTION, SOLUTION INTRAVENOUS at 23:32

## 2018-11-06 RX ADMIN — POTASSIUM CHLORIDE 10 MEQ: 29.8 INJECTION, SOLUTION INTRAVENOUS at 18:44

## 2018-11-06 RX ADMIN — URSODIOL 300 MG: 300 CAPSULE ORAL at 08:11

## 2018-11-06 RX ADMIN — GRANISETRON HYDROCHLORIDE 1 MG: 1 INJECTION INTRAVENOUS at 20:18

## 2018-11-06 RX ADMIN — Medication 2 G: at 13:06

## 2018-11-06 RX ADMIN — Medication 2 G: at 05:57

## 2018-11-06 RX ADMIN — ACYCLOVIR SODIUM 650 MG: 50 INJECTION, SOLUTION INTRAVENOUS at 08:11

## 2018-11-06 RX ADMIN — MORPHINE SULFATE 4 MG: 2 INJECTION, SOLUTION INTRAMUSCULAR; INTRAVENOUS at 13:12

## 2018-11-06 RX ADMIN — DEFIBROTIDE SODIUM 406 MG: 80 INJECTION, SOLUTION INTRAVENOUS at 06:26

## 2018-11-06 RX ADMIN — DEFIBROTIDE SODIUM 406 MG: 80 INJECTION, SOLUTION INTRAVENOUS at 00:34

## 2018-11-06 RX ADMIN — POTASSIUM CHLORIDE 10 MEQ: 29.8 INJECTION, SOLUTION INTRAVENOUS at 10:35

## 2018-11-06 RX ADMIN — ACYCLOVIR SODIUM 650 MG: 50 INJECTION, SOLUTION INTRAVENOUS at 14:49

## 2018-11-06 RX ADMIN — GRANISETRON HYDROCHLORIDE 1 MG: 1 INJECTION INTRAVENOUS at 08:11

## 2018-11-06 RX ADMIN — DEFIBROTIDE SODIUM 406 MG: 80 INJECTION, SOLUTION INTRAVENOUS at 13:06

## 2018-11-06 RX ADMIN — MICAFUNGIN SODIUM 150 MG: 10 INJECTION, POWDER, LYOPHILIZED, FOR SOLUTION INTRAVENOUS at 10:35

## 2018-11-06 RX ADMIN — FUROSEMIDE 10 MG: 10 INJECTION, SOLUTION INTRAMUSCULAR; INTRAVENOUS at 03:07

## 2018-11-06 RX ADMIN — MESNA 3255 MG: 100 INJECTION, SOLUTION INTRAVENOUS at 07:01

## 2018-11-06 RX ADMIN — CYCLOPHOSPHAMIDE 3255 MG: 2 INJECTION, POWDER, FOR SOLUTION INTRAVENOUS; ORAL at 09:56

## 2018-11-06 RX ADMIN — DEFIBROTIDE SODIUM 406 MG: 80 INJECTION, SOLUTION INTRAVENOUS at 18:44

## 2018-11-06 RX ADMIN — POTASSIUM CHLORIDE 10 MEQ: 29.8 INJECTION, SOLUTION INTRAVENOUS at 03:26

## 2018-11-06 RX ADMIN — URSODIOL 300 MG: 300 CAPSULE ORAL at 13:06

## 2018-11-06 NOTE — PLAN OF CARE
Problem: Stem Cell/Bone Marrow Transplant (Adult)  Goal: Signs and Symptoms of Listed Potential Problems Will be Absent, Minimized or Managed (Stem Cell/Bone Marrow Transplant)  Signs and symptoms of listed potential problems will be absent, minimized or managed by discharge/transition of care (reference Stem Cell/Bone Marrow Transplant (Adult) CPG).   Outcome: No Change  Patient was afebrile throughout shift, VSS, no appetite. Pt maintained adequate urine output for majority of shift, was given Lasix at 1400- at times was noncomplient with collecting urine. At start of shift pt reported no nausea or vomiting, no pain. Upon reassessment later in day, pt reported stomach pain and was given morphine. During morhpine administration, pt vomited. Upon reassessment, pain had decreased and no longer felt nauseous. Will continue to monitor urine status.

## 2018-11-06 NOTE — PLAN OF CARE
Problem: Patient Care Overview  Goal: Plan of Care/Patient Progress Review  Outcome: No Change  Afebrile. VSS. Lungs Clear. Denies pain. No appetite; no emesis. Lasix x 2 for not meeting urine output parameters every 2 hours. No stool. Continue to monitor and notify MD of any changes or concerns.     Hourly Rounding Completed.

## 2018-11-06 NOTE — PROGRESS NOTES
Pediatric BMT Daily Progress Note    Interval Events: Isael remains stable and afebrile. Culture from CVL insertion site drainage is NGTD. Continues to receive Cytoxan and Mesna, receiving intermittent doses of Lasix to meet UOP parameters.   Jaw pain when opens mouth, but no throat or other pains.    Review of Systems: Pertinent positives include those mentioned in interval events. A complete review of systems was performed and is otherwise negative.      Medications:  Please see MAR    Physical Exam:  Temp:  [97.7  F (36.5  C)-99.5  F (37.5  C)] 99.4  F (37.4  C)  Pulse:  [60-79] 79  Heart Rate:  [61] 61  Resp:  [16-20] 20  BP: ()/(55-80) 100/66  SpO2:  [97 %-99 %] 97 %  I/O last 3 completed shifts:  In: 5427 [I.V.:5381; IV Piggyback:46]  Out: 5249 [Urine:5249]    GEN: Lying in bed and awake, no acute distress, answers questions and talkative.  HEENT: Normocephalic, atraumatic, alopecia, sclera anicteric, non-injected, mucous membranes slightly dry, nares patent without discharge,   CARD: Heart regular rate and rhythm. No murmurs, rubs or gallops.  Cap refill 2 seconds.  RESP: Lungs clear to auscultation. No increased work of breathing, crackles or wheezes.   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 He is currently BMT Transplant Date: BMT Txp 11/2/2018 (4 days).  He is doing well clinically at this time with some persistent dry mouth.  His nausea is stable.      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  Conditioning per 2015-29: TBI days -4 through -1 followed by matched sibling donor transplant on  11/2   - awaiting count recovery.      # Risk for GVHD:   - Post-transplant Cytoxan days +3 and +4 (with continuous Mesna).  - Tacrolimus and MMF to start day +5      FEN/Renal:  # Risk for malnutrition: no history of TPN or enteral nutrition dependence   - Dietician consult upon admission, to follow   - Continue age appropriate diet   - Cytoxan flush (D5 1/2 NS at 220 mL/hr) (no longer on IV fluids with K)  - plan to initiate TPN after Cytoxan fluids complete      # Risk for electrolyte abnormalities:  - check daily electrolytes. Given cardiac history, keep K>3.5, Mg>1.8, iCa nml.   - receiving intermittent IV KCl bumps      # Risk for renal dysfunction and fluid overload:  - monitor I/O's and daily weights upon admission       # Risk for aHUS/TA-TMA: surveillance to initiate upon admission   - monitor LDH qMonday  - monitor urine protein/creatinine qTuesday      Pulmonary:  # Risk for pulmonary toxicity secondary to chemotherapy:   - monitor respiratory function per unit routine        # Rhinorrhea: 3 mos history, mild. Sinus CT (10/22) with findings of mucosal thickening without evidence of active infection  - RVP 10/22 resulted negative      # Pneumomediastinum: finding upon work up Chest CT (10/22), asymptomatic. Per radiology, likely benign, perhaps transient.   - Patient instructed to notify care team with any SOB, dyspnea, or remarkable cough      Cardiovascular:  # Aaron-Parkinson-White Syndrome: diagnosed upon syncope in 02/2018. Asymptomatic, no interventions to date. Work up EKG c/w WPW, sinus bradycardia at 49 bpm. Work up ECHO normal with EF 68%.   Cardiology visit with Dr. Plummer (10/22): Holter revealed 10 PACs with no evidence of AV block. Normal Echocardiogram with EF 68%. Isael has hx of HR in high 30s with sleep.  - Avoid catecholinergics as able to avoid tachycardia  - Maintain normal iCa+, mag > 1.8, K > 3.5         Heme:   # Pancytopenia secondary to chemotherapy: hx of one platelet  transfusion, well tolerated  - Transfuse for hemoglobin < 8, platelets < 30,000 (Defibrotide)  - No previous premedications, monitor for need  - GCSF to start day +5 until ANC >1500 x3        Infectious Disease:  # Risk for infection given immunocompromised status:   Active: now afebrile, but due to fever during conditioning, will continue Cefepime through engraftment  - monitor blood cultures-no growth to date  - CVL insertion site with purulent drainage but no erythema--sent wound cx 11/5, pending but NGTD    ID history: 3 mos hx of mild rhinorrhea. Chest/sinus CT without evidence of infection. 10/22 RVP negative.      Prophylaxis:                                                                                                                        - viral prophylaxis: CMV neg (donor CMV positive), HSV positive. Acyclovir  - fungal prophylaxis: high risk- Micafungin    - bacterial prophylaxis: Cefepime through engraftment (had fever during conditioning)      Past infections: none significant       GI:   # Nausea management:   - Scheduled medications: Kytril Q12H,   - PRN medications: Ativan, Benadryl, marinol, Phenergan (changed to PRN due to xerostomia)  - avoiding scop patch for now due to xerostomia      # Risk for VOD: especially high risk given previous Inotuzumab therapy.   - Ursodiol TID (10 ml/kg)  - Defibrotide ppx      ENT:  #epistaxis: very mild, nasal mucosa dry  - Ayr gel PRN  - Vaseline to nares    Neuro:  # Mucositis/pain (anticipated):   - Morphine 2-4 mg Q 3H PRN  - Integrative therapy       The above plan of care was developed by and communicated to me by the   Pediatric BMT attending physician, Dr. Viky Zavala.      Khushi Serrano MD  Pediatric BMT Hospitalist     BMT Attending Note:     Artemio was seen and evaluated by me today.     The significant interval history includes doing well.  Cytoxan flush.Received 3 doses of lasix yesterday for fluid parameters.  Jaw pain when opens mouth.        I have reviewed changes and data from the last 24 hours, including medications, laboratory results, vital signs, radiograph results, consultant recommendations and pathology results.      I have formulated and discussed the plan with the BMT team. I discussed the course and plan with the patient/family and answered all of their questions to the best of my ability. I counseled them regarding the followin yo with VHR B cell ALL here for matched sibling transplant with TBI and post transplant cytoxan, at risk for nausea and vomiting- kytril scheduled and continue phenergan and ativan PRN, at risk for VOD- defibrotide and ursodiol, at risk for malnutrition- will follow closely- on IVF, at risk of opportunistic infection- now afebrile on cefepime and prophylaxis acyclovir and hannah, history of WPW- keep electrolytes normal and avoid catecholinergics.  Pain- morphine PRN.   We will plan on starting TPN if needed after cytoxan. H/O expistaxis- on saline. Continue cytoxan today.  Starts tacro/MMF tomorrow.  [facility]       My care coordination activities today include oversight of planned lab studies, oversight of planned radiographic studies, oversight of medication changes, discussion with BMT team-members and discussion with consultants.     My total floor time today was at least 35 minutes, greater than 50% of which was counseling and coordination of care.        Viky Zavala MD, PhD    Pediatric Blood and Marrow Transplant  [facility]              Patient Active Problem List   Diagnosis     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)     Aaron-Parkinson-White (WPW) syndrome     Bone marrow transplant candidate     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)     At risk for infection

## 2018-11-06 NOTE — PLAN OF CARE
Problem: Stem Cell/Bone Marrow Transplant (Adult)  Goal: Signs and Symptoms of Listed Potential Problems Will be Absent, Minimized or Managed (Stem Cell/Bone Marrow Transplant)  Signs and symptoms of listed potential problems will be absent, minimized or managed by discharge/transition of care (reference Stem Cell/Bone Marrow Transplant (Adult) CPG).   Outcome: No Change  Patient afebrile. VSS and within parameter. LSC. O2 sats adequate on RA. No C/o of nausea or vomiting. Cytoxan flush continues. Patient required Lasix X1 for not meeting urine parameters, had good results. One occurrence of loose stool overnight. No C/o of pain. Patient C/o of dry nasal passages, pt. had nosebleed in AM, resolved without intervention. Potassium replaced overnight, Recheck 3.3, will receive another dose on days. Family not present at bedside. Hourly rounding completed.

## 2018-11-06 NOTE — PLAN OF CARE
Problem: Stem Cell/Bone Marrow Transplant (Adult)  Goal: Signs and Symptoms of Listed Potential Problems Will be Absent, Minimized or Managed (Stem Cell/Bone Marrow Transplant)  Signs and symptoms of listed potential problems will be absent, minimized or managed by discharge/transition of care (reference Stem Cell/Bone Marrow Transplant (Adult) CPG).   Outcome: No Change  Isael was afebrile. VSS. LS clear on room air. Stool x2. Emesis x1. Pt complained of abdominal pain. PRN morphine given x1 with good results. Good UOP. Cytoxan flush continues. PRN lasix given x1 for not meeting parameter. Pt was noncompliant with urine collection x1. This RN reassured patient of importance of urine collection. No issues since. Mom and Dad are at bedside. Hourly rounding complete. Will notify MD of changes. Continue with POC.

## 2018-11-07 LAB
ABO + RH BLD: NORMAL
ABO + RH BLD: NORMAL
ANION GAP SERPL CALCULATED.3IONS-SCNC: 8 MMOL/L (ref 3–14)
BACTERIA SPEC CULT: NO GROWTH
BLD GP AB SCN SERPL QL: NORMAL
BLOOD BANK CMNT PATIENT-IMP: NORMAL
BUN SERPL-MCNC: 6 MG/DL (ref 7–30)
CA-I BLD-MCNC: 4.9 MG/DL (ref 4.4–5.2)
CALCIUM SERPL-MCNC: 8.8 MG/DL (ref 8.5–10.1)
CELL TRANSPLANT TYPE: NORMAL
CHLORIDE SERPL-SCNC: 103 MMOL/L (ref 94–109)
CO2 SERPL-SCNC: 27 MMOL/L (ref 20–32)
CREAT SERPL-MCNC: 0.55 MG/DL (ref 0.66–1.25)
DIFFERENTIAL METHOD BLD: ABNORMAL
ERYTHROCYTE [DISTWIDTH] IN BLOOD BY AUTOMATED COUNT: 13.2 % (ref 10–15)
GFR SERPL CREATININE-BSD FRML MDRD: >90 ML/MIN/1.7M2
GLUCOSE SERPL-MCNC: 120 MG/DL (ref 70–99)
HCT VFR BLD AUTO: 33.5 % (ref 40–53)
HGB BLD-MCNC: 11.7 G/DL (ref 13.3–17.7)
Lab: NORMAL
MAGNESIUM SERPL-MCNC: 1.5 MG/DL (ref 1.6–2.3)
MCH RBC QN AUTO: 29.4 PG (ref 26.5–33)
MCHC RBC AUTO-ENTMCNC: 34.9 G/DL (ref 31.5–36.5)
MCV RBC AUTO: 84 FL (ref 78–100)
PLATELET # BLD AUTO: 51 10E9/L (ref 150–450)
POTASSIUM SERPL-SCNC: 3.2 MMOL/L (ref 3.4–5.3)
POTASSIUM SERPL-SCNC: 3.5 MMOL/L (ref 3.4–5.3)
POTASSIUM SERPL-SCNC: 3.5 MMOL/L (ref 3.4–5.3)
RBC # BLD AUTO: 3.98 10E12/L (ref 4.4–5.9)
SODIUM SERPL-SCNC: 138 MMOL/L (ref 133–144)
SODIUM SERPL-SCNC: 138 MMOL/L (ref 133–144)
SPECIMEN EXP DATE BLD: NORMAL
SPECIMEN SOURCE: NORMAL
WBC # BLD AUTO: 0.2 10E9/L (ref 4–11)

## 2018-11-07 PROCEDURE — 25800025 ZZH RX 258: Performed by: PEDIATRICS

## 2018-11-07 PROCEDURE — 83735 ASSAY OF MAGNESIUM: CPT | Performed by: NURSE PRACTITIONER

## 2018-11-07 PROCEDURE — 25000128 H RX IP 250 OP 636: Performed by: PEDIATRICS

## 2018-11-07 PROCEDURE — 86850 RBC ANTIBODY SCREEN: CPT | Performed by: NURSE PRACTITIONER

## 2018-11-07 PROCEDURE — 85025 COMPLETE CBC W/AUTO DIFF WBC: CPT | Performed by: NURSE PRACTITIONER

## 2018-11-07 PROCEDURE — 84295 ASSAY OF SERUM SODIUM: CPT | Performed by: PEDIATRICS

## 2018-11-07 PROCEDURE — 86901 BLOOD TYPING SEROLOGIC RH(D): CPT | Performed by: NURSE PRACTITIONER

## 2018-11-07 PROCEDURE — 25000125 ZZHC RX 250: Performed by: NURSE PRACTITIONER

## 2018-11-07 PROCEDURE — 84132 ASSAY OF SERUM POTASSIUM: CPT | Performed by: PEDIATRICS

## 2018-11-07 PROCEDURE — 82330 ASSAY OF CALCIUM: CPT | Performed by: PEDIATRICS

## 2018-11-07 PROCEDURE — 25000125 ZZHC RX 250: Performed by: PEDIATRICS

## 2018-11-07 PROCEDURE — 3E0436Z INTRODUCTION OF NUTRITIONAL SUBSTANCE INTO CENTRAL VEIN, PERCUTANEOUS APPROACH: ICD-10-PCS | Performed by: PEDIATRICS

## 2018-11-07 PROCEDURE — 80048 BASIC METABOLIC PNL TOTAL CA: CPT | Performed by: NURSE PRACTITIONER

## 2018-11-07 PROCEDURE — 25000128 H RX IP 250 OP 636: Performed by: NURSE PRACTITIONER

## 2018-11-07 PROCEDURE — 85027 COMPLETE CBC AUTOMATED: CPT

## 2018-11-07 PROCEDURE — C9399 UNCLASSIFIED DRUGS OR BIOLOG: HCPCS | Performed by: PEDIATRICS

## 2018-11-07 PROCEDURE — 84132 ASSAY OF SERUM POTASSIUM: CPT | Performed by: NURSE PRACTITIONER

## 2018-11-07 PROCEDURE — 25000132 ZZH RX MED GY IP 250 OP 250 PS 637: Performed by: NURSE PRACTITIONER

## 2018-11-07 PROCEDURE — 86900 BLOOD TYPING SEROLOGIC ABO: CPT | Performed by: NURSE PRACTITIONER

## 2018-11-07 PROCEDURE — 20000002 ZZH R&B BMT INTERMEDIATE

## 2018-11-07 RX ORDER — DEXTROSE MONOHYDRATE, SODIUM CHLORIDE, AND POTASSIUM CHLORIDE 50; .745; 4.5 G/1000ML; G/1000ML; G/1000ML
INJECTION, SOLUTION INTRAVENOUS CONTINUOUS
Status: DISCONTINUED | OUTPATIENT
Start: 2018-11-07 | End: 2018-11-26

## 2018-11-07 RX ADMIN — ACYCLOVIR SODIUM 650 MG: 50 INJECTION, SOLUTION INTRAVENOUS at 07:57

## 2018-11-07 RX ADMIN — POTASSIUM CHLORIDE, DEXTROSE MONOHYDRATE AND SODIUM CHLORIDE: 75; 5; 450 INJECTION, SOLUTION INTRAVENOUS at 14:54

## 2018-11-07 RX ADMIN — PHYTONADIONE: 1 INJECTION, EMULSION INTRAMUSCULAR; INTRAVENOUS; SUBCUTANEOUS at 19:46

## 2018-11-07 RX ADMIN — GRANISETRON HYDROCHLORIDE 1 MG: 1 INJECTION INTRAVENOUS at 07:57

## 2018-11-07 RX ADMIN — MYCOPHENOLATE MOFETIL 960 MG: 500 INJECTION, POWDER, LYOPHILIZED, FOR SOLUTION INTRAVENOUS at 05:40

## 2018-11-07 RX ADMIN — Medication 2 G: at 14:20

## 2018-11-07 RX ADMIN — URSODIOL 300 MG: 300 CAPSULE ORAL at 19:45

## 2018-11-07 RX ADMIN — MYCOPHENOLATE MOFETIL 960 MG: 500 INJECTION, POWDER, LYOPHILIZED, FOR SOLUTION INTRAVENOUS at 13:52

## 2018-11-07 RX ADMIN — FUROSEMIDE 10 MG: 10 INJECTION, SOLUTION INTRAMUSCULAR; INTRAVENOUS at 07:57

## 2018-11-07 RX ADMIN — ACYCLOVIR SODIUM 650 MG: 50 INJECTION, SOLUTION INTRAVENOUS at 00:05

## 2018-11-07 RX ADMIN — Medication 1 ML: at 20:48

## 2018-11-07 RX ADMIN — Medication 2 G: at 21:34

## 2018-11-07 RX ADMIN — URSODIOL 300 MG: 300 CAPSULE ORAL at 07:55

## 2018-11-07 RX ADMIN — I.V. FAT EMULSION 240 ML: 20 EMULSION INTRAVENOUS at 19:46

## 2018-11-07 RX ADMIN — DEFIBROTIDE SODIUM 406 MG: 80 INJECTION, SOLUTION INTRAVENOUS at 12:29

## 2018-11-07 RX ADMIN — MYCOPHENOLATE MOFETIL 960 MG: 500 INJECTION, POWDER, LYOPHILIZED, FOR SOLUTION INTRAVENOUS at 22:18

## 2018-11-07 RX ADMIN — POTASSIUM CHLORIDE 10 MEQ: 29.8 INJECTION, SOLUTION INTRAVENOUS at 18:43

## 2018-11-07 RX ADMIN — Medication 2 G: at 05:37

## 2018-11-07 RX ADMIN — MICAFUNGIN SODIUM 150 MG: 10 INJECTION, POWDER, LYOPHILIZED, FOR SOLUTION INTRAVENOUS at 07:58

## 2018-11-07 RX ADMIN — PANTOPRAZOLE SODIUM 40 MG: 20 TABLET, DELAYED RELEASE ORAL at 08:00

## 2018-11-07 RX ADMIN — URSODIOL 300 MG: 300 CAPSULE ORAL at 14:20

## 2018-11-07 RX ADMIN — DEFIBROTIDE SODIUM 406 MG: 80 INJECTION, SOLUTION INTRAVENOUS at 18:43

## 2018-11-07 RX ADMIN — DEFIBROTIDE SODIUM 406 MG: 80 INJECTION, SOLUTION INTRAVENOUS at 05:58

## 2018-11-07 RX ADMIN — MAGNESIUM SULFATE HEPTAHYDRATE 3000 MG: 500 INJECTION, SOLUTION INTRAMUSCULAR; INTRAVENOUS at 04:44

## 2018-11-07 RX ADMIN — DEFIBROTIDE SODIUM 406 MG: 80 INJECTION, SOLUTION INTRAVENOUS at 01:20

## 2018-11-07 RX ADMIN — DEXTROSE AND SODIUM CHLORIDE 1000 ML: 5; 450 INJECTION, SOLUTION INTRAVENOUS at 05:37

## 2018-11-07 RX ADMIN — ACYCLOVIR SODIUM 650 MG: 50 INJECTION, SOLUTION INTRAVENOUS at 14:54

## 2018-11-07 RX ADMIN — TACROLIMUS 0.03 MG/KG/DAY: 5 INJECTION, SOLUTION INTRAVENOUS at 20:47

## 2018-11-07 RX ADMIN — Medication 350 MCG: at 20:48

## 2018-11-07 RX ADMIN — FUROSEMIDE 10 MG: 10 INJECTION, SOLUTION INTRAMUSCULAR; INTRAVENOUS at 03:38

## 2018-11-07 RX ADMIN — GRANISETRON HYDROCHLORIDE 1 MG: 1 INJECTION INTRAVENOUS at 20:48

## 2018-11-07 RX ADMIN — DEXTROSE AND SODIUM CHLORIDE 1000 ML: 5; 450 INJECTION, SOLUTION INTRAVENOUS at 01:20

## 2018-11-07 NOTE — PROCEDURES
Radiotherapy Treatment Summary          Date of Report:  2018             PATIENT: GLEN HAMMER  MEDICAL RECORD NO: [id number]    : 2000     DIAGNOSIS: C91.01 Acute lymphoblastic leukemia, in remission     INTENT OF RADIOTHERAPY:  Part of conditioning regimen for stem cell transplantation        Details of the treatments summarized below are found in records kept in the Department of Radiation Oncology at [facility].  Treatment Summary:  Radiation Oncology - Course: 1 Protocol:   Treatment Site             Dose               Modality From To Elapsed Days Fx.  1 TBI  1,320 cGy    18X or 25X 10/29/2018 2018   3  8  1 Testicular Bst    400 cGy e12 10/30/2018 10/30/2018   1             Dose per Fraction: 165 cGy/fx for TBI and 400 cGy/fx for testicular boost  Total Dose:  1,320 cGy for TBI and 1,720 cGy for testicles                  COMMENTS:   Patient tolerated total body radiation without any acute toxicity.     PAIN MANAGEMENT:   Patient did not require any pain management related to total body radiation.  Pain otherwise managed   by inpatient team.     FOLLOW UP PLAN: Total Body Irradiation was well tolerated.  After discharge from the hospital   the patient will be followed in the Bone Marrow Transplant Clinic.        Nurse Practitioner    Staff Physician  JOHN Adams M.D.     Physicist   Peter Melendez, PhD     CC:   Viky Zavala MD                                 Radiation Oncology:  [facility] [address] [place] [address]

## 2018-11-07 NOTE — PLAN OF CARE
Problem: Stem Cell/Bone Marrow Transplant (Adult)  Goal: Signs and Symptoms of Listed Potential Problems Will be Absent, Minimized or Managed (Stem Cell/Bone Marrow Transplant)  Signs and symptoms of listed potential problems will be absent, minimized or managed by discharge/transition of care (reference Stem Cell/Bone Marrow Transplant (Adult) CPG).   Pt afebrile, VSS.  LS clear.  No c/o pain or nausea.  Did have several episodes of hiccups during early part of evening.  Is on cytoxan flush, made parameter except for once, lasix given x1 with good response.  Pt is alone in room.

## 2018-11-07 NOTE — PROGRESS NOTES
[facility]   PEDIATRIC BMT SOCIAL WORK PROGRESS NOTE  DATA:     Artemio is a 18 yr old male undergoing BMT for ALL. Artemio is his own medical decision maker. His parents  but both very involved. Mervat - Mom, Dangelo - Dad, Step-mom Camryn are all involved. Artemio's three parents will be sharing care giving duties post hospitalization.     met with the Artemio and his dad Dangelo on Tuesday nov. 6th for brief supportive check in. Artemio states he's starting to experience some side effects from the chemo such as nausea and vomiting which he states are not very pleasant. Overall he states that things are progressing basically how he thought they would. He states he is getting all the information and assistance he needs from the care team. He states that other than the chemo side effects being unpleasant his mood is overall good. He states he has no major concerns at this time.  Dangelo also states that things seem to be going well from his perspective and also stated that he has no concerns other than just wanting Artemio to get through the transplant process quickly and safely.        INTERVENTION:      Brief supportive counseling    ASSESSMENT:      Both Artemio and Dangelo appear to be coping well with transplant and extended hospitalization thus far. Patient was active and engaged in conversation. Mood and affect appeared appropriate for the situation. No concerns about depression or anxiety at this time.     PLAN:    will provide ongoing psychosocial support to patient and family as needed.      REJI Barnes, [facility]    Pediatric Blood and Marrow Transplant  [phone]  [email or web address]      11/7/2018    11:27 AM         Copied from chart review:    PEDIATRIC BLOOD & MARROW TRANSPLANT SOCIAL WORK PSYCHOSOCIAL ASSESSMENT                         Date: 10/26/18        Assessment of living situation, support system,  financial status, functional status, coping abilities/strategies, stressors, need for resources and other social work interventions.        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        Physician(s): Dr. Viky Zavala     Nurse Coordinator: Daria Jara        Presenting Information:      Artemio is a 18 year old male patient with Acute Lymphoblastic Leukemia (ALL) who is pursuing curative treatment through a bone marrow transplant. Artemio was admitted to the bone marrow transplant unit at the [facility] on [facility] Oct. 28th to begin his pre-transplant conditioning regimen. Artemio's older brother Link will be his matched sibling donor. Link is an adult and therefore he can provide his own consent for marrow donation procedure.         Special Considerations/Accomodations:      Patient's parents Dangelo and Mervat are  but amicable. They are both involved in Artemio's care as sources of support. Dangelo is remarried to his wife Camryn and Mervat has a significant other named Jada.  explained to Dangelo and Mervat that the care team will be unable to update multiple family members on a daily basis and recommended they come up with a system to update each other. They decided to trial keeping a notebook in Artemio's room and whoever is there getting updates from the doctor on any given day can take notes and leave them there for the next person to read and update as necessary.             Contact/Legal Info:      Decision Maker(s): Artemio  is an adult and able to make his own medical decisions at this time. He wants all medical decisions to go through him and not his parents.     Special Custody Considerations: N/A     Advance Directive: Artemio states he completed an advanced directive while undergoing cancer treatment at [facility]. He signed a release of information so his treatment team at [facility] can access a copy.      Permanent Address: [address] [place] [id number] Lives with  paternal grandmother and paternal grandfather     Local Address: [address] [place] [address]   Staying locally with father and step mother until he is greater than 100 days post BMT.     Phone number(s): Mom - Mervat: [phone]                                                       Dad - Dangelo: [phone]                                                       Step-Mom - Camryn: [phone]           Support System/Relationship Status/Family History: Artemio's parents and paternal grandparents are his primary support system. His parents, Mervat and Dangelo, are  and civil, but prefer to visit Artemio at different times and communicate directly with Artemio versus with each other as much as possible.  Dangelo is remarried and his wife's name is Camryn. Artemio has given verbal permission for his parents and Camryn all to get any information they would like to know. Edwige explained to all of his parents that the medical team will not be able to update multiple family members per day and that they should try to update each other as much as possible.   Artemio also has two siblings, an older sister and brother. His sister Kamilah lives locally in [place], [place] is  and has one child. His brother Link lives in [place], where Mervat's family is from. Mervat had also relocated back to [place] to assist her parents as they aged but has come back to [place] to assist with the transplant process.    Patient previously had been living in his patnernal grandparents basement. There were some concerns from various family members that this environment was not suitable for a post BMT patient. (reasoning was that it was damp and musty and it was unknown if mold and mildew were present. Fortunately Artemio decided that going back to live at his grandparents house was not a good idea for several reasons, mainly because his grandparents have their own health issues and he didn't want to stress them out by living at their house while also going through post BMT follow up.  Artemio states he does not have a significant other at this time.        Unique Patient/Family Needs:  Establishing effective communication pattern with patient and his family.     Spirituality/Lottie Affiliation: Patient does not identify with lottie community       Communication Preferences: Artemio wants all medical communication and decisions to go through him. He prefers direct to the point communication. He does not like to be bothered with examinations and questions if they're not necessary.           Caregiver Plan: Parents, Artemio's primary care givers will be Mervat Pierson and step-mom Camryn. He will be staying at his dad and step-mom's house after discharge.  continuing to work with Artemio's parents on this plan. Initially it was reported by other family members that Dangelo preferred not to have Mervat at their house when it was her turn to provide care giving duties and that same individual reported that Mervat preferred not to be at Dangelo and Camryn's house. The other most likely option would be for Isael to go back and forth from Dangelo and Camryn's house to Mervat's apartment. Edwige advised them to come up with a plan based on what was best for Artemio, what his preferences are, and what their family can manage.      Patient & Caregiver Knowledge of Medical Condition and Plan of Care: Artemio and his family are  "understanding of his medical condition and plan of care for BMT.     Patient and Caregiver Transplant Goals: Artemio and his parents state they \"Just want to get through it\".           Patient Personality/Communication/Coping/Interests/Activities: approachable, withdrawn and quiet. Patient states that when he doesn't feel good he irish by playing video games, watching movies or TV and sleeping. He also states he's not a morning person and prefers not to be bothered at night or if he's napping. He requested nursing bundle cares when possible. Edwige explained that the nurses try to be as accommodating as possible to patient preferences but it is not always possible due to the large number tasks they need to complete every day with every patient.     Patient Education/Developmental Level: Patient graduated from [employer] approximately a year ago and had been taking some time off before deciding about college. He plans to revisit applying to higher education once he is done with the BMT process.           Logistical Considerations:  Transportation: Private Car  Parking: at this time parents state they can afford to buy monthly parking passes.  Housing: patient and family is local.  Mervat has rented a short term apartment in [place] until Artemio is through transplant and she can return to [place].        Financial: Patient and his parents state that at this time they are able to meet their expenses with their current income. Because he essentially has 3 parents to share the care giving work, they all plan to continue working and therefore are hoping to maintain their current income.     Insurance:   [place]  Secondary: [facility]  Unique Issues?:      Patient/Caregiver Sources of Income/Employment: Patient is not employed at this time due to his frequent treatment needs related to his ALL.   Patient's mom, Mervat, is a tele nurse and has " started a short term part time casual position at [employer] in [place]. Patient's Dad, Dangelo, owns his own commercial painting business. Since he's the owner he has some flexibility in his schedule and can be away from work regularly. Patient's step mom, Camryn, is a nurse and works at one of the [employer] in the [facility] [employer] [facility].  Financial Concerns: none at this time. Artemio and his parents were encouraged to reach out if they had any financial concerns in the future.           Patient/Family Psychosocial Considerations:     Mental Health: No mental health issues identified        Chemical Health: No issues identified       Trauma/Loss/Abuse History: No identified issues       Legal Issues: No issues identified           Clinical Assessment and Recommendations:      Patient and family present as well-informed about and prepared for the treatment process. I did not identify any significant barriers to them managing the demands of treatment.        Concerns: none identified at this time.     Education Provided: Transplant process expectations, Caregiver requirements, Caregiver self-care, Financial issues related to transplant, Financial resources/grants available, Common psychosocial stressors pre/post transplant, Hopsital resources available and Social work role       Interventions Provided:   Education and counseling related to psychosocial issues and resources     Follow up planned:  Psychosocial support     REJI Barnes, [facility]    Pediatric Blood and Marrow Transplant  [phone]  [email or web address]

## 2018-11-07 NOTE — PLAN OF CARE
Problem: Patient Care Overview  Goal: Plan of Care/Patient Progress Review  Outcome: No Change  Isael was afebrile. VSS. LS clear on room air. Small, loose stool x4. Pt c/o stomach cramping. Resolved on its own. No emesis. Denies nausea. Ate small amounts of food today. Good UOP. Lasix given x1. Cytoxan flush completed without incident. No PRNs or replacements given. Hourly rounding complete. Will notify MD of changes. Continue with POC.

## 2018-11-07 NOTE — PROGRESS NOTES
Copied from Chart Review:    PEDIATRIC BLOOD & MARROW TRANSPLANT SOCIAL WORK PSYCHOSOCIAL ASSESSMENT                         Date: 10/26/18        Assessment of living situation, support system, financial status, functional status, coping abilities/strategies, stressors, need for resources and other social work interventions.        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        Physician(s): Dr. Viky Zavala     Nurse Coordinator: Daria Jara        Presenting Information:      Artemio is a 18 year old male patient with Acute Lymphoblastic Leukemia (ALL) who is pursuing curative treatment through a bone marrow transplant. Artemio was admitted to the bone marrow transplant unit at the [facility] on [place] Oct. 28th to begin his pre-transplant conditioning regimen. Artemio's older brother Link will be his matched sibling donor. Link is an adult and therefore he can provide his own consent for marrow donation procedure.         Special Considerations/Accomodations:      Patient's parents Dangelo and Mervat are  but amicable. They are both involved in Artemio's care as sources of support. Dangelo is remarried to his wife Camryn and Mervat has a significant other named Jada.  explained to Dangelo and Mervat that the care team will be unable to update multiple family members on a daily basis and recommended they come up with a system to update each other. They decided to trial keeping a notebook in Artemio's room and whoever is there getting  updates from the doctor on any given day can take notes and leave them there for the next person to read and update as necessary.             Contact/Legal Info:      Decision Maker(s): Artemio is an adult and able to make his own medical decisions at this time. He wants all medical decisions to go through him and not his parents.     Special Custody Considerations: N/A     Advance Directive: Artemio states he completed an advanced directive while undergoing cancer treatment at [facility]. He signed a release of information so his treatment team at [facility] can access a copy.      Permanent Address: [address] [place] [address] Lives with  paternal grandmother and paternal grandfather     Local Address: [address] [place] [address]   Staying locally with father and step mother until he is greater than 100 days post BMT.     Phone number(s): Mom - Mervat: [phone]                                                       Dad - Dangelo: [phone]                                                       Step-Mom - Camryn: [phone]           Support System/Relationship Status/Family History: Artemio's parents and paternal grandparents are his primary support system. His parents, Mervat and Dangelo, are  and civil, but prefer to visit Artemio at different times and communicate directly with Artemio versus with each other as much as possible.  Dangelo is remarried and his wife's name is Camryn. Artemio has given verbal permission for his parents and Camryn all to get any information they would like to know. SWer explained to all of his parents that the medical team will not be able to update multiple family members per day and that they should try to update each other as much as possible.   Artemio also has two siblings, an older sister and brother. His sister Kamilah lives locally in [place], [place] is  and has one child. His brother Link lives in  [place], where Mervat's family is from. Mervat had also relocated back to [place] to assist her parents as they aged but has come back to [place] to assist with the transplant process.   Patient previously had been living in his patnernal grandparents basement. There were some concerns from various family members that this environment was not suitable for a post BMT patient. (reasoning was that it was damp and musty and it was unknown if mold and mildew were present. Fortunately, Artemio decided that going back to live at his grandparents house during post BMT follow up was not a good idea for several reasons, mainly because his grandparents have their own health issues and he didn't want to stress them out by living at their house while also going through his own health issues..  Artemio states he does not have a significant other at this time.        Unique Patient/Family Needs:  Establishing effective communication pattern with patient and his parents.     Spirituality/Lottie Affiliation: Patient does not identify with lottie community       Communication Preferences: Artemio wants all medical communication and decisions to go through him. He prefers direct to the point communication. He does not like to be bothered with examinations and questions if they're not necessary.           Caregiver Plan: Parents, Artemio's primary care givers will be Mervat Pierson and step-mom Camryn. He will be staying at his dad and step-mom's house after discharge.  continuing to work with Artemio's parents on this plan. Initially it was reported by other family members that Dangelo preferred not to have Mervat at their house when it was her turn to provide care giving duties and that same individual reported that Mervat preferred not to be at Dangelo and Camryn's house. The other most likely option would be for Isael to go back and forth from Dangelo and Camryn's house to Mervat's apartment. Edwige advised them to come up  "with a plan based on what was best for Artemio, what his preferences are, and what their family can manage.      Patient & Caregiver Knowledge of Medical Condition and Plan of Care: Artemio and his family are understanding of his medical condition and plan of care for BMT.     Patient and Caregiver Transplant Goals: Artemio and his parents state they \"Just want to get through it\".           Patient Personality/Communication/Coping/Interests/Activities: approachable, withdrawn and quiet. Patient states that when he doesn't feel good he irish by playing video games, watching movies or TV and sleeping. He also states he's not a morning person and prefers not to be bothered at night or if he's napping. He requested nursing bundle cares when possible. Edwige explained that the nurses try to be as accommodating as possible to patient preferences but it is not always possible due to the large number tasks they need to complete every day with every patient.     Patient Education/Developmental Level: Patient graduated from [employer] approximately a year ago and had been taking some time off before deciding about college. He plans to revisit applying to higher education once he is done with the BMT process.           Logistical Considerations:  Transportation: Private Car  Parking: at this time parents state they can afford to buy monthly parking passes.  Housing: patient and family is local.  Mervat has rented a short term apartment in [place] until Artemio is through transplant and she can return to [place].        Financial: Patient and his parents state that at this time they are able to meet their expenses with their current income. Because he essentially has 3 parents to share the care giving work, they all plan to continue working and therefore are hoping to maintain their current income.     Insurance:   Primary: Blue Cross Blue [employer] of [place], Camryn carries this insurance.  Secondary: [employer] [place]  Unique Issues?:      Patient/Caregiver Sources of Income/Employment: Patient is not employed at this time due to his frequent treatment needs related to his ALL.   Patient's mom, Mervat, is a tele nurse and has started a short term part time casual position at [employer] in [place]. Patient's Dad, Dangelo, owns his own commercial painting business. Since he's the owner he has some flexibility in his schedule and can be away from work regularly. Patient's step mom, Camryn, is a nurse and works at one of the [employer] in the [employer].  Financial Concerns: none at this time. Artemio and his parents were encouraged to reach out if they had any financial concerns in the future.           Patient/Family Psychosocial Considerations:     Mental Health: No mental health issues identified        Chemical Health: No issues identified       Trauma/Loss/Abuse History: No identified issues       Legal Issues: No issues identified           Clinical Assessment and Recommendations:      Patient and family present as well-informed about and prepared for the treatment process. I did not identify any significant barriers to them managing the demands of treatment.        Concerns: none identified at this time.     Education Provided: Transplant process expectations, Caregiver requirements, Caregiver self-care, Financial issues related to transplant, Financial resources/grants available, Common psychosocial stressors pre/post transplant, Hopsital resources available and Social work role       Interventions Provided:   Education and counseling related to psychosocial issues and resources     Follow up planned:  Psychosocial support     REJI Barnes, [facility]    Pediatric Blood and Marrow Transplant  [phone]  [email or web address]

## 2018-11-07 NOTE — PROGRESS NOTES
CLINICAL NUTRITION SERVICES - Brief note (see reassessment 11/2 for full details)    Received provider consult for pharmacy/nutrition to start and manage PN.     Labs: K+ (WNL); Mg++ 1.5 (L); Phos 3.9 (WNL as of 11/5)    Receiving final dose of cytoxan flush today providing a GIR of 2.8 mg/kd/min.    Nutrition Interventions:  -Collaboration of care: Discussed PN recs with pharmacy as follows - Recommend goal PN of  Dextrose 225 g, GIR 2.4, 98 g Amino Acids, 1.5 g/kg Amino Acids, 240 mL lipids for 1637 kcals, (25 kcal/kg) to meet 100% of kcal needs and 100% of protein needs. Recommended checking phos (none obtained since 11/5) and baseline TG level. Also discussed low Mg++ and increasing in PN and/or replacing prior to initiation. Continue to monitor lytes and replace/increase in PN as indicated.     Karley Engle RD, LD  Unit Pager: [phone]

## 2018-11-08 LAB
ALBUMIN SERPL-MCNC: 3.5 G/DL (ref 3.4–5)
ALP SERPL-CCNC: 31 U/L (ref 40–150)
ALT SERPL W P-5'-P-CCNC: 16 U/L (ref 0–70)
ANION GAP SERPL CALCULATED.3IONS-SCNC: 8 MMOL/L (ref 3–14)
AST SERPL W P-5'-P-CCNC: 18 U/L (ref 0–45)
BILIRUB DIRECT SERPL-MCNC: 0.2 MG/DL (ref 0–0.2)
BILIRUB SERPL-MCNC: 0.8 MG/DL (ref 0.2–1.3)
BLD PROD TYP BPU: NORMAL
BLD PROD TYP BPU: NORMAL
BLD UNIT ID BPU: 0
BLOOD PRODUCT CODE: NORMAL
BPU ID: NORMAL
BUN SERPL-MCNC: 10 MG/DL (ref 7–30)
CALCIUM SERPL-MCNC: 8.9 MG/DL (ref 8.5–10.1)
CHLORIDE SERPL-SCNC: 106 MMOL/L (ref 94–109)
CO2 SERPL-SCNC: 24 MMOL/L (ref 20–32)
CREAT SERPL-MCNC: 0.53 MG/DL (ref 0.66–1.25)
DIFFERENTIAL METHOD BLD: ABNORMAL
ERYTHROCYTE [DISTWIDTH] IN BLOOD BY AUTOMATED COUNT: 13.1 % (ref 10–15)
FIBRINOGEN PPP-MCNC: 452 MG/DL (ref 200–420)
GFR SERPL CREATININE-BSD FRML MDRD: >90 ML/MIN/1.7M2
GLUCOSE SERPL-MCNC: 125 MG/DL (ref 70–99)
HCT VFR BLD AUTO: 32.7 % (ref 40–53)
HGB BLD-MCNC: 11.7 G/DL (ref 13.3–17.7)
INR PPP: 1.11 (ref 0.86–1.14)
MAGNESIUM SERPL-MCNC: 2.1 MG/DL (ref 1.6–2.3)
MCH RBC QN AUTO: 29.8 PG (ref 26.5–33)
MCHC RBC AUTO-ENTMCNC: 35.8 G/DL (ref 31.5–36.5)
MCV RBC AUTO: 83 FL (ref 78–100)
NUM BPU REQUESTED: 1
PHOSPHATE SERPL-MCNC: 3.6 MG/DL (ref 2.5–4.5)
PLATELET # BLD AUTO: 21 10E9/L (ref 150–450)
POTASSIUM SERPL-SCNC: 3.8 MMOL/L (ref 3.4–5.3)
PREALB SERPL IA-MCNC: 21 MG/DL (ref 15–45)
PROT SERPL-MCNC: 7.1 G/DL (ref 6.8–8.8)
RBC # BLD AUTO: 3.93 10E12/L (ref 4.4–5.9)
SODIUM SERPL-SCNC: 138 MMOL/L (ref 133–144)
TACROLIMUS BLD-MCNC: 9.4 UG/L (ref 5–15)
TME LAST DOSE: NORMAL H
TRANSFUSION STATUS PATIENT QL: NORMAL
TRANSFUSION STATUS PATIENT QL: NORMAL
WBC # BLD AUTO: 0.2 10E9/L (ref 4–11)

## 2018-11-08 PROCEDURE — 85384 FIBRINOGEN ACTIVITY: CPT | Performed by: PEDIATRICS

## 2018-11-08 PROCEDURE — P9037 PLATE PHERES LEUKOREDU IRRAD: HCPCS | Performed by: PEDIATRICS

## 2018-11-08 PROCEDURE — 25000125 ZZHC RX 250: Performed by: NURSE PRACTITIONER

## 2018-11-08 PROCEDURE — 25000128 H RX IP 250 OP 636: Performed by: NURSE PRACTITIONER

## 2018-11-08 PROCEDURE — 25000132 ZZH RX MED GY IP 250 OP 250 PS 637: Performed by: NURSE PRACTITIONER

## 2018-11-08 PROCEDURE — 85027 COMPLETE CBC AUTOMATED: CPT

## 2018-11-08 PROCEDURE — 82248 BILIRUBIN DIRECT: CPT | Performed by: PEDIATRICS

## 2018-11-08 PROCEDURE — 25000125 ZZHC RX 250: Performed by: PEDIATRICS

## 2018-11-08 PROCEDURE — 20000002 ZZH R&B BMT INTERMEDIATE

## 2018-11-08 PROCEDURE — 83735 ASSAY OF MAGNESIUM: CPT | Performed by: PEDIATRICS

## 2018-11-08 PROCEDURE — 25000128 H RX IP 250 OP 636: Performed by: PEDIATRICS

## 2018-11-08 PROCEDURE — 80053 COMPREHEN METABOLIC PANEL: CPT | Performed by: PEDIATRICS

## 2018-11-08 PROCEDURE — 84295 ASSAY OF SERUM SODIUM: CPT | Performed by: PEDIATRICS

## 2018-11-08 PROCEDURE — C9399 UNCLASSIFIED DRUGS OR BIOLOG: HCPCS | Performed by: PEDIATRICS

## 2018-11-08 PROCEDURE — 80197 ASSAY OF TACROLIMUS: CPT | Performed by: PEDIATRICS

## 2018-11-08 PROCEDURE — 84134 ASSAY OF PREALBUMIN: CPT | Performed by: PEDIATRICS

## 2018-11-08 PROCEDURE — 84100 ASSAY OF PHOSPHORUS: CPT | Performed by: PEDIATRICS

## 2018-11-08 PROCEDURE — 85610 PROTHROMBIN TIME: CPT | Performed by: PEDIATRICS

## 2018-11-08 RX ADMIN — ACYCLOVIR SODIUM 650 MG: 50 INJECTION, SOLUTION INTRAVENOUS at 08:11

## 2018-11-08 RX ADMIN — MYCOPHENOLATE MOFETIL 960 MG: 500 INJECTION, POWDER, LYOPHILIZED, FOR SOLUTION INTRAVENOUS at 05:48

## 2018-11-08 RX ADMIN — MORPHINE SULFATE 2 MG: 2 INJECTION, SOLUTION INTRAMUSCULAR; INTRAVENOUS at 12:42

## 2018-11-08 RX ADMIN — MORPHINE SULFATE 2 MG: 2 INJECTION, SOLUTION INTRAMUSCULAR; INTRAVENOUS at 12:49

## 2018-11-08 RX ADMIN — DEFIBROTIDE SODIUM 406 MG: 80 INJECTION, SOLUTION INTRAVENOUS at 01:32

## 2018-11-08 RX ADMIN — Medication 350 MCG: at 20:08

## 2018-11-08 RX ADMIN — I.V. FAT EMULSION 240 ML: 20 EMULSION INTRAVENOUS at 19:23

## 2018-11-08 RX ADMIN — PANTOPRAZOLE SODIUM 40 MG: 20 TABLET, DELAYED RELEASE ORAL at 08:11

## 2018-11-08 RX ADMIN — ACYCLOVIR SODIUM 650 MG: 50 INJECTION, SOLUTION INTRAVENOUS at 17:00

## 2018-11-08 RX ADMIN — GRANISETRON HYDROCHLORIDE 1 MG: 1 INJECTION INTRAVENOUS at 20:07

## 2018-11-08 RX ADMIN — PHYTONADIONE: 1 INJECTION, EMULSION INTRAMUSCULAR; INTRAVENOUS; SUBCUTANEOUS at 19:24

## 2018-11-08 RX ADMIN — URSODIOL 300 MG: 300 CAPSULE ORAL at 08:11

## 2018-11-08 RX ADMIN — Medication 2 G: at 05:47

## 2018-11-08 RX ADMIN — Medication 2 G: at 14:07

## 2018-11-08 RX ADMIN — GRANISETRON HYDROCHLORIDE 1 MG: 1 INJECTION INTRAVENOUS at 10:10

## 2018-11-08 RX ADMIN — DEFIBROTIDE SODIUM 406 MG: 80 INJECTION, SOLUTION INTRAVENOUS at 17:00

## 2018-11-08 RX ADMIN — URSODIOL 300 MG: 300 CAPSULE ORAL at 20:07

## 2018-11-08 RX ADMIN — TACROLIMUS 0.03 MG/KG/DAY: 5 INJECTION, SOLUTION INTRAVENOUS at 20:07

## 2018-11-08 RX ADMIN — MYCOPHENOLATE MOFETIL 960 MG: 500 INJECTION, POWDER, LYOPHILIZED, FOR SOLUTION INTRAVENOUS at 14:44

## 2018-11-08 RX ADMIN — DEFIBROTIDE SODIUM 406 MG: 80 INJECTION, SOLUTION INTRAVENOUS at 08:11

## 2018-11-08 RX ADMIN — ACYCLOVIR SODIUM 650 MG: 50 INJECTION, SOLUTION INTRAVENOUS at 01:32

## 2018-11-08 RX ADMIN — URSODIOL 300 MG: 300 CAPSULE ORAL at 14:06

## 2018-11-08 RX ADMIN — Medication 0.2 ML: at 20:15

## 2018-11-08 RX ADMIN — MICAFUNGIN SODIUM 150 MG: 10 INJECTION, POWDER, LYOPHILIZED, FOR SOLUTION INTRAVENOUS at 10:11

## 2018-11-08 RX ADMIN — DEFIBROTIDE SODIUM 406 MG: 80 INJECTION, SOLUTION INTRAVENOUS at 12:04

## 2018-11-08 RX ADMIN — MYCOPHENOLATE MOFETIL 960 MG: 500 INJECTION, POWDER, LYOPHILIZED, FOR SOLUTION INTRAVENOUS at 21:51

## 2018-11-08 RX ADMIN — Medication 5 ML: at 20:07

## 2018-11-08 RX ADMIN — Medication 2 G: at 21:22

## 2018-11-08 NOTE — PLAN OF CARE
Problem: Patient Care Overview  Goal: Plan of Care/Patient Progress Review  Outcome: No Change  Patient has been afebrile, other vital signs are within parameter. Bilateral lung sounds clear, SaO2 in the upper 90's to 100% on room air. Platelets given, tolerated transfusion well. Adequate urine output, no stool this shift. Continue on Tacrolimus drip, still need a level recheck at 0800. Hourly rounding completed. Continue to monitor and refer for any concerns.

## 2018-11-08 NOTE — PROGRESS NOTES
Pediatric BMT Daily Progress Note    Interval Events: Artemio remains stable and afebrile. He has continued to have loose stools. Appetite remains poor.    Review of Systems: Pertinent positives include those mentioned in interval events. A complete review of systems was performed and is otherwise negative.      Medications:  Please see MAR    Physical Exam:  Temp:  [97.3  F (36.3  C)-98.3  F (36.8  C)] 98  F (36.7  C)  Pulse:  [62-83] 62  Heart Rate:  [] 75  Resp:  [16-22] 18  BP: ()/(61-95) 96/61  SpO2:  [98 %-100 %] 99 %  I/O last 3 completed shifts:  In: 2972.77 [I.V.:1994.77]  Out: 2761 [Urine:2514; Stool:247]    GEN: Sleeping, wakes for exam, no distress   HEENT: Normocephalic, atraumatic, alopecia, sclera anicteric, non-injected, mucous membranes slightly dry, nares patent without discharge    CARD: Heart regular rate and rhythm. No murmurs, rubs or gallops.  Cap refill 2 seconds.  RESP: Lungs clear to auscultation. No increased work of breathing, crackles or wheezes.   ABD: Non-distended, non-tender, bowel sounds hypoactive  EXTREM: No edema noted  SKIN: No rashes noted     Labs:  Labs reviewed, pertinent findings BMP with BUN 10, Cr 0.53.  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 He is currently BMT Transplant Date: BMT Txp 11/2/2018 (+6 days).  He is doing well clinically at this time with some persistent dry mouth.  His nausea is stable.      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  Conditioning per 2015-29: TBI days -4 through -1 followed by matched sibling donor transplant on 11/2   - awaiting count recovery       # Risk for GVHD: Post-transplant Cytoxan on days +3 and +4 complete   -  Continue MMF   - Tacrolimus gtt, level today  slightly below goal at 9.4. Increase gtt and re-check 11/9.       FEN/Renal:  # Risk for malnutrition: no history of TPN or enteral nutrition dependence   - Dietician following   - Regular diet as tolerated   - continue TPN/lipids      # Risk for electrolyte abnormalities:  - check daily electrolytes. Given cardiac history, keep K>3.5, Mg>1.8, iCa nml.   - Mag and K sliding scale       # Risk for renal dysfunction and fluid overload:  - monitor I/O's and daily weights       # Risk for aHUS/TA-TMA:    - monitor LDH qMonday  - monitor urine protein/creatinine qTuesday      Pulmonary:  # Risk for pulmonary toxicity secondary to chemotherapy:   - monitor respiratory function per unit routine      # Rhinorrhea: 3 mos history, mild. Sinus CT (10/22) with findings of mucosal thickening without evidence of active infection  - RVP 10/22 resulted negative      # Pneumomediastinum: finding upon work up Chest CT (10/22), asymptomatic. Per radiology, likely benign, perhaps transient.   - Patient instructed to notify care team with any SOB, dyspnea, or remarkable cough      Cardiovascular:  # Aaron-Parkinson-White Syndrome: diagnosed upon syncope in 02/2018. Asymptomatic, no interventions to date. Work up EKG c/w WPW, sinus bradycardia at 49 bpm. Work up ECHO normal with EF 68%.   Cardiology visit with Dr. Plummer (10/22): Holter revealed 10 PACs with no evidence of AV block. Normal Echocardiogram with EF 68%. Isael has hx of HR in high 30s with sleep.  - Avoid catecholinergics as able to avoid tachycardia  - Maintain normal iCa+, mag > 1.8, K > 3.5      # risk for hypertension secondary to medications:   - PRN hydralazine available    Heme:   # Pancytopenia secondary to chemotherapy: hx of one platelet transfusion, well tolerated  - Transfuse for hemoglobin < 8, platelets < 30,000 (Defibrotide)  - No previous premedications, monitor for need  - GCSF to start day +5 until ANC >1500 x3     Infectious Disease:  # Risk for  infection given immunocompromised status:   Active: now afebrile, but due to fever during conditioning, will continue Cefepime through engraftment  - monitor blood cultures-no growth to date  - CVL insertion site with purulent drainage but no erythema--sent wound cx 11/5 NGTD      ID history: 3 mos hx of mild rhinorrhea. Chest/sinus CT without evidence of infection. 10/22 RVP negative.      Prophylaxis:                                                                                                                        - viral prophylaxis: CMV neg (donor CMV positive), HSV positive. Acyclovir  - fungal prophylaxis: high risk- Micafungin    - bacterial prophylaxis: Cefepime through engraftment (had fever during conditioning)      Past infections: none significant       GI:   # Nausea management:   - Scheduled medications: Kytril Q12H   - PRN medications: Ativan, Benadryl, marinol, Phenergan (changed to PRN due to xerostomia)  - avoiding scop patch for now due to xerostomia      # Risk for VOD: especially high risk given previous Inotuzumab therapy.   - Ursodiol TID (10 mg/kg)  - Defibrotide ppx      ENT:  #epistaxis: very mild, nasal mucosa dry  - Ayr gel PRN  - Vaseline to nares     Neuro:  # Mucositis/pain (anticipated):   - Morphine 2-4 mg Q 3H PRN  - Integrative therapy       The above plan of care was developed by and communicated to me by the   Pediatric BMT attending physician, Dr. Viky Zavala.       Khushi Serrano MD  Pediatric BMT Hospitalist        BMT Attending Note:     Arteimo was seen and evaluated by me today.     The significant interval history includes the following: Artemio continues to do well at this point post transplant.     I have reviewed changes and data from the last 24 hours, including medications, laboratory results, vital signs, radiograph results, consultant recommendations and pathology results.      I have formulated and discussed the plan with the BMT team. I discussed the  course and plan with the patient/family and answered all of their questions to the best of my ability. I counseled them regarding the following:  some difficulties with nausea and vomiting, discomfort in the dominga-umbilical area.- kytril scheduled and continue phenergan and ativan PRN, at risk for VOD - on defibrotide and ursodiol. Also at risk of opportunistic infection. No recent fevers. History of WPW.     My care coordination activities today include oversight of planned lab studies, oversight of planned radiographic studies, oversight of medication changes, discussion with BMT team-members and discussion with consultants.     My total floor time today was at least 35 minutes, greater than 50% of which was counseling and coordination of care.        Candido Joshua MD   Professor, Dept of Pediatrics  Division of Blood and Marrow Transplantation      Patient Active Problem List   Diagnosis     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)     Aaron-Parkinson-White (WPW) syndrome     Bone marrow transplant candidate     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)     At risk for infection

## 2018-11-08 NOTE — PLAN OF CARE
Problem: Patient Care Overview  Goal: Plan of Care/Patient Progress Review  Outcome: No Change    Afebrile, VSS, lungs clear. No reports of pain or nausea. Continues to have decreased appetite, TPN/IL started tonight. Had two small loose stools. Voiding well. Potassium replaced x1. Hourly rounding completed.

## 2018-11-09 LAB
ANION GAP SERPL CALCULATED.3IONS-SCNC: 7 MMOL/L (ref 3–14)
BLD PROD TYP BPU: NORMAL
BLD PROD TYP BPU: NORMAL
BLD UNIT ID BPU: 0
BLOOD PRODUCT CODE: NORMAL
BPU ID: NORMAL
BUN SERPL-MCNC: 13 MG/DL (ref 7–30)
CA-I BLD-MCNC: 5.1 MG/DL (ref 4.4–5.2)
CALCIUM SERPL-MCNC: 9.1 MG/DL (ref 8.5–10.1)
CHLORIDE SERPL-SCNC: 109 MMOL/L (ref 94–109)
CO2 SERPL-SCNC: 24 MMOL/L (ref 20–32)
CREAT SERPL-MCNC: 0.55 MG/DL (ref 0.66–1.25)
DIFFERENTIAL METHOD BLD: ABNORMAL
ERYTHROCYTE [DISTWIDTH] IN BLOOD BY AUTOMATED COUNT: 12.9 % (ref 10–15)
GFR SERPL CREATININE-BSD FRML MDRD: >90 ML/MIN/1.7M2
GLUCOSE SERPL-MCNC: 101 MG/DL (ref 70–99)
HCT VFR BLD AUTO: 30.3 % (ref 40–53)
HGB BLD-MCNC: 10.8 G/DL (ref 13.3–17.7)
MAGNESIUM SERPL-MCNC: 1.8 MG/DL (ref 1.6–2.3)
MCH RBC QN AUTO: 29.2 PG (ref 26.5–33)
MCHC RBC AUTO-ENTMCNC: 35.6 G/DL (ref 31.5–36.5)
MCV RBC AUTO: 82 FL (ref 78–100)
NUM BPU REQUESTED: 1
PHOSPHATE SERPL-MCNC: 3.8 MG/DL (ref 2.5–4.5)
PLATELET # BLD AUTO: 25 10E9/L (ref 150–450)
POTASSIUM SERPL-SCNC: 4.4 MMOL/L (ref 3.4–5.3)
RBC # BLD AUTO: 3.7 10E12/L (ref 4.4–5.9)
SODIUM SERPL-SCNC: 140 MMOL/L (ref 133–144)
TACROLIMUS BLD-MCNC: 10.8 UG/L (ref 5–15)
TME LAST DOSE: NORMAL H
TRANSFUSION STATUS PATIENT QL: NORMAL
TRANSFUSION STATUS PATIENT QL: NORMAL
WBC # BLD AUTO: 0.1 10E9/L (ref 4–11)

## 2018-11-09 PROCEDURE — 25000125 ZZHC RX 250: Performed by: PEDIATRICS

## 2018-11-09 PROCEDURE — 85027 COMPLETE CBC AUTOMATED: CPT

## 2018-11-09 PROCEDURE — 25000128 H RX IP 250 OP 636: Performed by: PEDIATRICS

## 2018-11-09 PROCEDURE — 25000128 H RX IP 250 OP 636: Performed by: NURSE PRACTITIONER

## 2018-11-09 PROCEDURE — 25800025 ZZH RX 258: Performed by: PEDIATRICS

## 2018-11-09 PROCEDURE — 20000002 ZZH R&B BMT INTERMEDIATE

## 2018-11-09 PROCEDURE — P9037 PLATE PHERES LEUKOREDU IRRAD: HCPCS | Performed by: PEDIATRICS

## 2018-11-09 PROCEDURE — 80197 ASSAY OF TACROLIMUS: CPT | Performed by: PEDIATRICS

## 2018-11-09 PROCEDURE — 25000132 ZZH RX MED GY IP 250 OP 250 PS 637: Performed by: NURSE PRACTITIONER

## 2018-11-09 PROCEDURE — 83735 ASSAY OF MAGNESIUM: CPT | Performed by: NURSE PRACTITIONER

## 2018-11-09 PROCEDURE — 82330 ASSAY OF CALCIUM: CPT | Performed by: PEDIATRICS

## 2018-11-09 PROCEDURE — 80048 BASIC METABOLIC PNL TOTAL CA: CPT | Performed by: NURSE PRACTITIONER

## 2018-11-09 PROCEDURE — C9399 UNCLASSIFIED DRUGS OR BIOLOG: HCPCS | Performed by: PEDIATRICS

## 2018-11-09 PROCEDURE — 84100 ASSAY OF PHOSPHORUS: CPT | Performed by: NURSE PRACTITIONER

## 2018-11-09 PROCEDURE — 25000125 ZZHC RX 250: Performed by: NURSE PRACTITIONER

## 2018-11-09 RX ORDER — NALOXONE HYDROCHLORIDE 0.4 MG/ML
.1-.4 INJECTION, SOLUTION INTRAMUSCULAR; INTRAVENOUS; SUBCUTANEOUS
Status: DISCONTINUED | OUTPATIENT
Start: 2018-11-09 | End: 2018-11-09

## 2018-11-09 RX ORDER — MENTHOL AND ZINC OXIDE .44; 20.625 G/100G; G/100G
OINTMENT TOPICAL 4 TIMES DAILY PRN
Status: DISCONTINUED | OUTPATIENT
Start: 2018-11-09 | End: 2018-11-21

## 2018-11-09 RX ORDER — MORPHINE SULFATE 2 MG/ML
1 INJECTION, SOLUTION INTRAMUSCULAR; INTRAVENOUS ONCE
Status: DISCONTINUED | OUTPATIENT
Start: 2018-11-09 | End: 2018-11-14

## 2018-11-09 RX ADMIN — GRANISETRON HYDROCHLORIDE 1 MG: 1 INJECTION INTRAVENOUS at 20:38

## 2018-11-09 RX ADMIN — ACYCLOVIR SODIUM 650 MG: 50 INJECTION, SOLUTION INTRAVENOUS at 09:41

## 2018-11-09 RX ADMIN — I.V. FAT EMULSION 240 ML: 20 EMULSION INTRAVENOUS at 21:40

## 2018-11-09 RX ADMIN — URSODIOL 300 MG: 300 CAPSULE ORAL at 07:50

## 2018-11-09 RX ADMIN — PANTOPRAZOLE SODIUM 40 MG: 20 TABLET, DELAYED RELEASE ORAL at 07:50

## 2018-11-09 RX ADMIN — Medication 350 MCG: at 20:37

## 2018-11-09 RX ADMIN — URSODIOL 300 MG: 300 CAPSULE ORAL at 20:37

## 2018-11-09 RX ADMIN — MYCOPHENOLATE MOFETIL 960 MG: 500 INJECTION, POWDER, LYOPHILIZED, FOR SOLUTION INTRAVENOUS at 15:16

## 2018-11-09 RX ADMIN — MYCOPHENOLATE MOFETIL 960 MG: 500 INJECTION, POWDER, LYOPHILIZED, FOR SOLUTION INTRAVENOUS at 22:52

## 2018-11-09 RX ADMIN — DEFIBROTIDE SODIUM 406 MG: 80 INJECTION, SOLUTION INTRAVENOUS at 18:53

## 2018-11-09 RX ADMIN — Medication 2 ML: at 21:17

## 2018-11-09 RX ADMIN — ACYCLOVIR SODIUM 650 MG: 50 INJECTION, SOLUTION INTRAVENOUS at 00:17

## 2018-11-09 RX ADMIN — Medication 2 G: at 22:23

## 2018-11-09 RX ADMIN — Medication 2 G: at 14:24

## 2018-11-09 RX ADMIN — DEFIBROTIDE SODIUM 406 MG: 80 INJECTION, SOLUTION INTRAVENOUS at 05:45

## 2018-11-09 RX ADMIN — TACROLIMUS 0.03 MG/KG/DAY: 5 INJECTION, SOLUTION INTRAVENOUS at 21:41

## 2018-11-09 RX ADMIN — MICAFUNGIN SODIUM 150 MG: 10 INJECTION, POWDER, LYOPHILIZED, FOR SOLUTION INTRAVENOUS at 09:41

## 2018-11-09 RX ADMIN — MORPHINE SULFATE 4 MG: 2 INJECTION, SOLUTION INTRAMUSCULAR; INTRAVENOUS at 08:28

## 2018-11-09 RX ADMIN — DEFIBROTIDE SODIUM 406 MG: 80 INJECTION, SOLUTION INTRAVENOUS at 00:17

## 2018-11-09 RX ADMIN — Medication 2 G: at 05:45

## 2018-11-09 RX ADMIN — MORPHINE SULFATE 4 MG: 2 INJECTION, SOLUTION INTRAMUSCULAR; INTRAVENOUS at 14:03

## 2018-11-09 RX ADMIN — ACYCLOVIR SODIUM 650 MG: 50 INJECTION, SOLUTION INTRAVENOUS at 17:32

## 2018-11-09 RX ADMIN — ACETAMINOPHEN 650 MG: 325 TABLET, FILM COATED ORAL at 09:55

## 2018-11-09 RX ADMIN — Medication 2 ML: at 20:37

## 2018-11-09 RX ADMIN — GRANISETRON HYDROCHLORIDE 1 MG: 1 INJECTION INTRAVENOUS at 07:50

## 2018-11-09 RX ADMIN — POTASSIUM CHLORIDE, DEXTROSE MONOHYDRATE AND SODIUM CHLORIDE: 75; 5; 450 INJECTION, SOLUTION INTRAVENOUS at 21:54

## 2018-11-09 RX ADMIN — URSODIOL 300 MG: 300 CAPSULE ORAL at 13:49

## 2018-11-09 RX ADMIN — MORPHINE SULFATE: 5 INJECTION, SOLUTION INTRAVENOUS at 21:52

## 2018-11-09 RX ADMIN — DEFIBROTIDE SODIUM 406 MG: 80 INJECTION, SOLUTION INTRAVENOUS at 11:56

## 2018-11-09 RX ADMIN — MYCOPHENOLATE MOFETIL 960 MG: 500 INJECTION, POWDER, LYOPHILIZED, FOR SOLUTION INTRAVENOUS at 07:39

## 2018-11-09 RX ADMIN — PHYTONADIONE: 1 INJECTION, EMULSION INTRAMUSCULAR; INTRAVENOUS; SUBCUTANEOUS at 21:39

## 2018-11-09 RX ADMIN — POTASSIUM CHLORIDE, DEXTROSE MONOHYDRATE AND SODIUM CHLORIDE: 75; 5; 450 INJECTION, SOLUTION INTRAVENOUS at 21:32

## 2018-11-09 RX ADMIN — MORPHINE SULFATE: 5 INJECTION, SOLUTION INTRAVENOUS at 15:57

## 2018-11-09 NOTE — PLAN OF CARE
Problem: Stem Cell/Bone Marrow Transplant (Adult)  Goal: Signs and Symptoms of Listed Potential Problems Will be Absent, Minimized or Managed (Stem Cell/Bone Marrow Transplant)  Signs and symptoms of listed potential problems will be absent, minimized or managed by discharge/transition of care (reference Stem Cell/Bone Marrow Transplant (Adult) CPG).   Pt was afebrile, VSS, lung sounds clear, sats well on RA. No pain or n/v expressed. Good UO, no stool. Platelets replaced. Small nose bleed. No family present at bedside, hourly rounding complete, continue POC.

## 2018-11-09 NOTE — PROGRESS NOTES
CLINICAL NUTRITION SERVICES - REASSESSMENT NOTE    ANTHROPOMETRICS  Height/Length: 174 cm  Weight: 61.4 kg  BMI: 21.55 kg/m2, healthy weight for height  Dosing Weight: 65.1 kg  Comments:weight down 5% since admit    CURRENT NUTRITION ORDERS  Diet: Regular diet    CURRENT NUTRITION SUPPORT   Parenteral Nutrition:  Type of Parenteral Access: Central  PN frequency: Continuous    PN of 1392 mLs, Dextrose 225 g, GIR 2.4 mg/kg/min, 98 g Amino Acids, 1.5 g/kg Amino Acids, 240 mL lipids for 1637 kcals, (25 kcal/kg). PN is meeting 100% of kcal needs and 100% of protein needs.    Intake/Tolerance: no po recorded    Current factors affecting nutrition intake include:decreased appetite, mouth sores and nausea    NEW FINDINGS:  Starting to have mouth sores    LABS  Labs reviewed    MEDICATIONS  Medications reviewed    ASSESSED NUTRITION NEEDS:  Estimated Energy Needs: 25-30 kcal/kg  Estimated Protein Needs: 1.5 g/kg  Estimated Fluid Needs: 1mL/kcal      PEDIATRIC NUTRITION STATUS VALIDATION  Patient does not meet criteria for malnutrition based on adult malnutrition criteria.    EVALUATION OF PREVIOUS PLAN OF CARE:   Monitoring from previous assessment:  Food and Beverage intake- no po recorded  Enteral and parenteral nutrition intake- started on PN  Anthropometric measurements- weight down slightly from admit but up since starting PN    Previous Goals:   1. Wt maintenance within 5% of admit weight- goal in progress  2. Po and/or nutrition support to meet greater than 75% of nutritional needs- goal met with PN    Previous Nutrition Diagnosis:   Predicted suboptimal nutrient intake related to anticipated decline in po related to transplant course   Evaluation: declining    NUTRITION DIAGNOSIS:  Predicted suboptimal nutrient intake related to decreased appetite and po and mucositis with reliance on PN to meet nutritional needs with potential for interruptions.    INTERVENTIONS  Nutrition Prescription  Nutrition support to meet  needs until able to transition back to po    Implementation:  Parenteral Nutrition- Continues on current macronutrients in PN. Collaboration and Referral of Nutrition care- pt discussed in rounds.    Goals  1. Wt maintenance within 5% of admit weight  2. Po and/or nutrition support to meet greater than 75% of nutritional needs    FOLLOW UP/MONITORING  Food and Beverage intake- monitor po, Enteral and parenteral nutrition intake- follow for need and Anthropometric measurements- monitor wt    Renetta Schwab, RD, LD, [facility]  [phone]

## 2018-11-09 NOTE — PLAN OF CARE
Problem: Stem Cell/Bone Marrow Transplant (Adult)  Goal: Signs and Symptoms of Listed Potential Problems Will be Absent, Minimized or Managed (Stem Cell/Bone Marrow Transplant)  Signs and symptoms of listed potential problems will be absent, minimized or managed by discharge/transition of care (reference Stem Cell/Bone Marrow Transplant (Adult) CPG).   [id number]: Pt was afebrile, VSS. Lung sounds clear, cough more present this AM. Pain expressed in mouth 6-7/10, PRN Morphine and Tylenol given. Morphine with minimal relief and Tylenol with some. Showered. Mother at bedside, hourly rounding completed, continue POC.

## 2018-11-09 NOTE — PROGRESS NOTES
Pediatric BMT Daily Progress Note    Interval Events: Artemio has been stable. Reporting a mouth sore for which he received morphine PRN. He reports inadequate relief with intermittent PRN doses of morphine.  He remains afebrile.      Review of Systems: Pertinent positives include those mentioned in interval events. A complete review of systems was performed and is otherwise negative.      Medications:  Please see MAR    Physical Exam:  Temp:  [97.3  F (36.3  C)-99  F (37.2  C)] 98  F (36.7  C)  Pulse:  [77-89] 81  Heart Rate:  [79] 79  Resp:  [14-18] 14  BP: (100-122)/(65-89) 121/77  SpO2:  [95 %-100 %] 100 %  I/O last 3 completed shifts:  In: 3247.83 [I.V.:1312.83]  Out: 1709 [Urine:1609; Stool:100]  GEN: Awake, alert, pleasant   HEENT: Normocephalic, atraumatic, alopecia, sclera anicteric, non-injected, mouth with some erythema on buccal mucosa, nares patent without discharge    CARD: Heart regular rate and rhythm. No murmurs, rubs or gallops.  Cap refill 2 seconds.  RESP: Lungs clear to auscultation. No increased work of breathing, crackles or wheezes.   ABD: Non-distended, non-tender, bowel sounds hypoactive  EXTREM: No edema noted  SKIN: No rashes noted    Labs:  Labs reviewed, pertinent findings BMP with BUN 13, Cr 0.55.  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 He is currently BMT Transplant Date: BMT Txp 11/2/2018 (+7 days).      Artemio remains stable and afebrile. He has emerging mucositis.        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  Conditioning per 2015-29: TBI days -4 through -1 followed by matched sibling donor transplant on 11/2   - awaiting count recovery       # Risk for GVHD: Post-transplant Cytoxan on days +3 and +4 complete   - Continue MMF    - Tacrolimus drip, level PENDING today       FEN/Renal:  # Risk for malnutrition: no history of TPN or enteral nutrition dependence   - Dietician following, minimal intake   - Cntinue TPN/lipids      # Risk for electrolyte abnormalities:  - Check daily electrolytes. Given cardiac history, keep K>3.5, Mg>1.8, iCa nml.   - Mag and K sliding scale       # Risk for renal dysfunction and fluid overload:  - monitor I/O's and daily weights       # Risk for aHUS/TA-TMA:    - monitor LDH qMonday  - monitor urine protein/creatinine qTuesday      Pulmonary:  # Risk for pulmonary toxicity secondary to chemotherapy:   - monitor respiratory function per unit routine      # Rhinorrhea: 3 mos history, mild. Sinus CT (10/22) with findings of mucosal thickening without evidence of active infection  - RVP 10/22 resulted negative      # Pneumomediastinum: finding upon work up Chest CT (10/22), asymptomatic. Per radiology, likely benign, perhaps transient.   - Patient instructed to notify care team with any SOB, dyspnea, or remarkable cough      Cardiovascular:  # Aaron-Parkinson-White Syndrome: diagnosed upon syncope in 02/2018. Asymptomatic, no interventions to date. Work up EKG c/w WPW, sinus bradycardia at 49 bpm. Work up ECHO normal with EF 68%.   Cardiology visit with Dr. Plummer (10/22): Holter revealed 10 PACs with no evidence of AV block. Normal Echocardiogram with EF 68%. Isael has hx of HR in high 30s with sleep.  - Avoid catecholinergics as able to avoid tachycardia  - Maintain normal iCa+, mag > 1.8, K > 3.5      # risk for hypertension secondary to medications:   - PRN hydralazine available     Heme:   # Pancytopenia secondary to chemotherapy: hx of one platelet transfusion, well tolerated  - Transfuse for hemoglobin < 8, platelets < 30,000 (Defibrotide)  - No previous premedications, monitor for need  - GCSF until ANC >1500 x3      Infectious Disease:  # Risk for infection given immunocompromised status:   Active: now  afebrile, but due to fever during conditioning, will continue Cefepime through engraftment  - monitor blood cultures-no growth to date  - CVL insertion site with purulent drainage but no erythema--sent wound cx 11/5 NGTD       ID history: 3 mos hx of mild rhinorrhea. Chest/sinus CT without evidence of infection. 10/22 RVP negative.      Prophylaxis:                                                                                                                        - viral prophylaxis: CMV neg (donor CMV positive), HSV positive. Acyclovir  - fungal prophylaxis: high risk - Micafungin    - bacterial prophylaxis: Cefepime through engraftment (had fever during conditioning)      Past infections: none significant       GI:   # Nausea management:   - Scheduled medications: Kytril Q12H   - PRN medications: Ativan, Benadryl, Marinol, Phenergan (changed to PRN due to xerostomia)  - avoiding scop patch for now due to xerostomia      # Risk for VOD: especially high risk given previous Inotuzumab therapy.   - Ursodiol TID (10 mg/kg)  - Defibrotide ppx      ENT:  #epistaxis: very mild, nasal mucosa dry  - Ayr gel PRN  - Vaseline to nares      Neuro:  # Mucositis/pain (anticipated):   - Start morphine PCA   - Integrative therapy       The above plan of care was developed by and communicated to me by the   Pediatric BMT attending physician, Dr. Candido Cabezas MD  Pediatric BMT Hospitalist      BMT Attending Note:      Artemio was seen and evaluated by me today.      The significant interval history includes the following: Artemio continues to do well post transplant, but he has difficulties with pain control due to mucositis.  He is not eating, and we are watching his fluids, weight and nutrition.  I have reviewed changes and data from the last 24 hours, including medications, laboratory results, vital signs, radiograph results, consultant recommendations and pathology results.       I have formulated  and discussed the plan with the BMT team. I discussed the course and plan with the patient/family and answered all of their questions to the best of my ability. I counseled them regarding difficulties with nausea and vomiting, a new rash on the left knee, and risks of opportunistic infection. No recent fevers. History of WPW.      My care coordination activities today include oversight of planned lab studies, oversight of planned radiographic studies, oversight of medication changes, discussion with BMT team-members and discussion with consultants.      My total floor time today was at least 35 minutes, greater than 50% of which was counseling and coordination of care.          Candido Joshua MD   Professor, Dept of Pediatrics  Division of Blood and Marrow Transplantation

## 2018-11-09 NOTE — PLAN OF CARE
Problem: Stem Cell/Bone Marrow Transplant (Adult)  Goal: Signs and Symptoms of Listed Potential Problems Will be Absent, Minimized or Managed (Stem Cell/Bone Marrow Transplant)  Signs and symptoms of listed potential problems will be absent, minimized or managed by discharge/transition of care (reference Stem Cell/Bone Marrow Transplant (Adult) CPG).   Outcome: No Change  Artemio is afebrile. All vital signs stable. Lung sounds clear on room air. No complaints of nausea or vomiting. No appetite, drinking sips of water. No stool on this shift. Good UOP. No complaints of pain. No PRNs given. No replacements. Family not at bedside. Continue POC. Notify MD of any changes.

## 2018-11-10 LAB
ABO + RH BLD: NORMAL
ABO + RH BLD: NORMAL
ANION GAP SERPL CALCULATED.3IONS-SCNC: 6 MMOL/L (ref 3–14)
BLD GP AB SCN SERPL QL: NORMAL
BLOOD BANK CMNT PATIENT-IMP: NORMAL
BUN SERPL-MCNC: 14 MG/DL (ref 7–30)
CALCIUM SERPL-MCNC: 8.9 MG/DL (ref 8.5–10.1)
CHLORIDE SERPL-SCNC: 110 MMOL/L (ref 94–109)
CO2 SERPL-SCNC: 24 MMOL/L (ref 20–32)
CREAT SERPL-MCNC: 0.59 MG/DL (ref 0.66–1.25)
DIFFERENTIAL METHOD BLD: ABNORMAL
ERYTHROCYTE [DISTWIDTH] IN BLOOD BY AUTOMATED COUNT: 12.7 % (ref 10–15)
GFR SERPL CREATININE-BSD FRML MDRD: >90 ML/MIN/1.7M2
GLUCOSE SERPL-MCNC: 107 MG/DL (ref 70–99)
HCT VFR BLD AUTO: 27.7 % (ref 40–53)
HGB BLD-MCNC: 9.6 G/DL (ref 13.3–17.7)
MAGNESIUM SERPL-MCNC: 1.7 MG/DL (ref 1.6–2.3)
MAGNESIUM SERPL-MCNC: 2 MG/DL (ref 1.6–2.3)
MCH RBC QN AUTO: 29.2 PG (ref 26.5–33)
MCHC RBC AUTO-ENTMCNC: 34.7 G/DL (ref 31.5–36.5)
MCV RBC AUTO: 84 FL (ref 78–100)
PHOSPHATE SERPL-MCNC: 4.4 MG/DL (ref 2.5–4.5)
PLATELET # BLD AUTO: 46 10E9/L (ref 150–450)
POTASSIUM SERPL-SCNC: 4.1 MMOL/L (ref 3.4–5.3)
RBC # BLD AUTO: 3.29 10E12/L (ref 4.4–5.9)
SODIUM SERPL-SCNC: 140 MMOL/L (ref 133–144)
SPECIMEN EXP DATE BLD: NORMAL
TACROLIMUS BLD-MCNC: 10.2 UG/L (ref 5–15)
TME LAST DOSE: NORMAL H
WBC # BLD AUTO: 0.1 10E9/L (ref 4–11)

## 2018-11-10 PROCEDURE — 85027 COMPLETE CBC AUTOMATED: CPT

## 2018-11-10 PROCEDURE — 25000125 ZZHC RX 250: Performed by: PEDIATRICS

## 2018-11-10 PROCEDURE — 25000128 H RX IP 250 OP 636: Performed by: PEDIATRICS

## 2018-11-10 PROCEDURE — C9399 UNCLASSIFIED DRUGS OR BIOLOG: HCPCS | Performed by: PEDIATRICS

## 2018-11-10 PROCEDURE — 20000002 ZZH R&B BMT INTERMEDIATE

## 2018-11-10 PROCEDURE — 86850 RBC ANTIBODY SCREEN: CPT | Performed by: NURSE PRACTITIONER

## 2018-11-10 PROCEDURE — 80197 ASSAY OF TACROLIMUS: CPT | Performed by: PEDIATRICS

## 2018-11-10 PROCEDURE — 25000125 ZZHC RX 250: Performed by: NURSE PRACTITIONER

## 2018-11-10 PROCEDURE — 83735 ASSAY OF MAGNESIUM: CPT | Performed by: NURSE PRACTITIONER

## 2018-11-10 PROCEDURE — 80048 BASIC METABOLIC PNL TOTAL CA: CPT | Performed by: NURSE PRACTITIONER

## 2018-11-10 PROCEDURE — 25000128 H RX IP 250 OP 636: Performed by: NURSE PRACTITIONER

## 2018-11-10 PROCEDURE — 84100 ASSAY OF PHOSPHORUS: CPT | Performed by: NURSE PRACTITIONER

## 2018-11-10 PROCEDURE — 86900 BLOOD TYPING SEROLOGIC ABO: CPT | Performed by: NURSE PRACTITIONER

## 2018-11-10 PROCEDURE — 25000132 ZZH RX MED GY IP 250 OP 250 PS 637: Performed by: NURSE PRACTITIONER

## 2018-11-10 PROCEDURE — 86901 BLOOD TYPING SEROLOGIC RH(D): CPT | Performed by: NURSE PRACTITIONER

## 2018-11-10 RX ADMIN — DEFIBROTIDE SODIUM 406 MG: 80 INJECTION, SOLUTION INTRAVENOUS at 11:40

## 2018-11-10 RX ADMIN — URSODIOL 300 MG: 300 CAPSULE ORAL at 08:02

## 2018-11-10 RX ADMIN — MAGNESIUM SULFATE HEPTAHYDRATE 3000 MG: 500 INJECTION, SOLUTION INTRAMUSCULAR; INTRAVENOUS at 03:02

## 2018-11-10 RX ADMIN — ACYCLOVIR SODIUM 650 MG: 50 INJECTION, SOLUTION INTRAVENOUS at 00:55

## 2018-11-10 RX ADMIN — TACROLIMUS 0.03 MG/KG/DAY: 5 INJECTION, SOLUTION INTRAVENOUS at 20:49

## 2018-11-10 RX ADMIN — Medication 2 G: at 13:39

## 2018-11-10 RX ADMIN — MYCOPHENOLATE MOFETIL 960 MG: 500 INJECTION, POWDER, LYOPHILIZED, FOR SOLUTION INTRAVENOUS at 22:00

## 2018-11-10 RX ADMIN — DEFIBROTIDE SODIUM 406 MG: 80 INJECTION, SOLUTION INTRAVENOUS at 00:55

## 2018-11-10 RX ADMIN — Medication 2 G: at 04:58

## 2018-11-10 RX ADMIN — ACYCLOVIR SODIUM 650 MG: 50 INJECTION, SOLUTION INTRAVENOUS at 07:27

## 2018-11-10 RX ADMIN — MORPHINE SULFATE: 5 INJECTION, SOLUTION INTRAVENOUS at 08:26

## 2018-11-10 RX ADMIN — I.V. FAT EMULSION 240 ML: 20 EMULSION INTRAVENOUS at 20:44

## 2018-11-10 RX ADMIN — Medication 350 MCG: at 20:47

## 2018-11-10 RX ADMIN — Medication 1 ML: at 20:55

## 2018-11-10 RX ADMIN — GRANISETRON HYDROCHLORIDE 1 MG: 1 INJECTION INTRAVENOUS at 20:49

## 2018-11-10 RX ADMIN — DEFIBROTIDE SODIUM 406 MG: 80 INJECTION, SOLUTION INTRAVENOUS at 07:27

## 2018-11-10 RX ADMIN — MYCOPHENOLATE MOFETIL 960 MG: 500 INJECTION, POWDER, LYOPHILIZED, FOR SOLUTION INTRAVENOUS at 14:12

## 2018-11-10 RX ADMIN — DEFIBROTIDE SODIUM 400 MG: 80 INJECTION, SOLUTION INTRAVENOUS at 17:04

## 2018-11-10 RX ADMIN — MICAFUNGIN SODIUM 150 MG: 10 INJECTION, POWDER, LYOPHILIZED, FOR SOLUTION INTRAVENOUS at 09:23

## 2018-11-10 RX ADMIN — PANTOPRAZOLE SODIUM 40 MG: 20 TABLET, DELAYED RELEASE ORAL at 08:02

## 2018-11-10 RX ADMIN — URSODIOL 300 MG: 300 CAPSULE ORAL at 20:45

## 2018-11-10 RX ADMIN — GRANISETRON HYDROCHLORIDE 1 MG: 1 INJECTION INTRAVENOUS at 08:02

## 2018-11-10 RX ADMIN — ACYCLOVIR SODIUM 650 MG: 50 INJECTION, SOLUTION INTRAVENOUS at 17:00

## 2018-11-10 RX ADMIN — Medication 0.5 ML: at 20:47

## 2018-11-10 RX ADMIN — MYCOPHENOLATE MOFETIL 960 MG: 500 INJECTION, POWDER, LYOPHILIZED, FOR SOLUTION INTRAVENOUS at 05:25

## 2018-11-10 RX ADMIN — PHYTONADIONE: 1 INJECTION, EMULSION INTRAMUSCULAR; INTRAVENOUS; SUBCUTANEOUS at 20:44

## 2018-11-10 RX ADMIN — TACROLIMUS 0.03 MG/KG/DAY: 5 INJECTION, SOLUTION INTRAVENOUS at 20:45

## 2018-11-10 RX ADMIN — Medication 2 G: at 21:17

## 2018-11-10 NOTE — PROGRESS NOTES
Pediatric BMT Daily Progress Note    Interval Events: Isael had increase in mouth pain overnight and PCA dosing was increased this morning. He also was complaining of very significant perianal/groin itching and pain. Overnight hospitalist examined and noted some areas of skin breakdown. Discussed care with mom and Isael.  Mom had questions about care of mouth sores, otherwise no new concerns.      Review of Systems: Pertinent positives include those mentioned in interval events. A complete review of systems was performed and is otherwise negative.      Medications:  Please see MAR    Physical Exam:  Temp:  [97.5  F (36.4  C)-98  F (36.7  C)] 98  F (36.7  C)  Pulse:  [79-97] 85  Heart Rate:  [85] 85  Resp:  [14-18] 16  BP: (104-121)/(58-82) 104/58  SpO2:  [97 %-100 %] 98 %  I/O last 3 completed shifts:  In: 3402.24 [P.O.:50; I.V.:1520.24]  Out: 1230 [Urine:1230]  GEN: Awake, alert, pleasant, no distress   HEENT: Normocephalic, atraumatic, alopecia, sclera anicteric, non-injected, mouth with some erythema and breakdown on buccal mucosa and lateral aspects of tongue, nares patent without discharge    CARD: Heart regular rate and rhythm. No murmurs, rubs or gallops.  Cap refill 2 seconds.  RESP: Lungs clear to auscultation. No increased work of breathing, crackles or wheezes.   ABD: Non-distended, non-tender, bowel sounds hypoactive  EXTREM: No edema noted  SKIN: No rashes noted    Labs:  Labs reviewed, pertinent findings BMP with BUN 14, Cr 0.59.  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 He is currently BMT Transplant Date: BMT Txp 11/2/2018 (+8 days).       Artemio remains stable and afebrile. He has worsening mucositis and breakdown of skin in groin.        BMT:  # High risk B cell ALL (CNS negative) + JAK2 activation/BMT: Latest bone marrow biopsy (10/15) MRD 0.006%; Ruxolitinib  continued until admission.  Conditioning per 2015-29: TBI days -4 through -1 followed by matched sibling donor transplant on 11/2   - awaiting count recovery       # Risk for GVHD: Post-transplant Cytoxan on days +3 and +4 complete   - Continue MMF   - Tacrolimus drip, level PENDING today       FEN/Renal:  # Risk for malnutrition: no history of TPN or enteral nutrition dependence   - Dietician following, minimal intake   - Cntinue TPN/lipids      # Risk for electrolyte abnormalities:  - Check daily electrolytes. Given cardiac history (WPW), keep K>3.5, Mg>1.8, iCa nml.   - Mag and K sliding scale       # Risk for renal dysfunction and fluid overload:  - monitor I/O's and daily weights       # Risk for aHUS/TA-TMA:    - monitor LDH qMonday  - monitor urine protein/creatinine qTuesday      Pulmonary:  # Risk for pulmonary toxicity secondary to chemotherapy:   - monitor respiratory function per unit routine      # Rhinorrhea: 3 mos history, mild. Sinus CT (10/22) with findings of mucosal thickening without evidence of active infection  - RVP 10/22 resulted negative      # Pneumomediastinum: finding upon work up Chest CT (10/22), asymptomatic. Per radiology, likely benign, perhaps transient.   - Patient instructed to notify care team with any SOB, dyspnea, or remarkable cough      Cardiovascular:  # Aaron-Parkinson-White Syndrome: diagnosed upon syncope in 02/2018. Asymptomatic, no interventions to date. Work up EKG c/w WPW, sinus bradycardia at 49 bpm. Work up ECHO normal with EF 68%.   Cardiology visit with Dr. Plummer (10/22): Holter revealed 10 PACs with no evidence of AV block. Normal Echocardiogram with EF 68%. Isael has hx of HR in high 30s with sleep.  - Avoid catecholinergics as able to avoid tachycardia  - Maintain normal iCa+, mag > 1.8, K > 3.5      # risk for hypertension secondary to medications:   - PRN hydralazine available      Heme:   # Pancytopenia secondary to chemotherapy: hx of one platelet  transfusion, well tolerated  - Transfuse for hemoglobin < 8, platelets < 30,000 (Defibrotide)  - No previous premedications, monitor for need  - GCSF until ANC >1500 x3      Infectious Disease:  # Risk for infection given immunocompromised status:   Active: now afebrile, but due to fever during conditioning, will continue Cefepime through engraftment  - monitor blood cultures-no growth to date  - CVL insertion site with purulent drainage but no erythema -- sent wound cx 11/5 NGTD       ID history: 3 mos hx of mild rhinorrhea. Chest/sinus CT without evidence of infection. 10/22 RVP negative.      Prophylaxis:                                                                                                                        - viral prophylaxis: CMV neg (donor CMV positive), HSV positive. Acyclovir  - fungal prophylaxis: high risk - Micafungin    - bacterial prophylaxis: Cefepime through engraftment (had fever during conditioning)      Past infections: none significant       GI:   # Nausea management:   - Scheduled medications: Kytril Q12H   - PRN medications: Ativan, Benadryl, Marinol, Phenergan    - avoiding scop patch for now due to xerostomia      # Risk for VOD: especially high risk given previous Inotuzumab therapy.   - Ursodiol TID (10 mg/kg)  - Defibrotide ppx      ENT:  #epistaxis: very mild, nasal mucosa dry  - Ayr gel PRN  - Vaseline to nares      Neuro:  # Mucositis/pain (anticipated):   - Continue morphine PCA, increased dosing this morning, monitor for improvement in pain management.    - Integrative therapy       The above plan of care was developed by and communicated to me by the   Pediatric BMT attending physician, Dr. Candido Cabezas MD  Pediatric BMT Hospitalist       BMT Attending Note:      Artemio was seen and evaluated by me today.       The significant interval history includes ongoing issues with pain control due to mucositis, primarily in the mouth and throat.  He is  not eating, and we are attempting to optimize his narcotics to assist him.  He has otherwise been stable in terms of this vital signs.  I have reviewed changes and data from the last 24 hours, including medications, laboratory results, vital signs, radiograph results, consultant recommendations and pathology results.       I have formulated and discussed the plan with the BMT team. I discussed the course and plan with Artemio and his mother, and answered all of their questions to the best of my ability. I counseled them regarding difficulties with pain, the new rash on the left knee (improving), and risks of opportunistic infection. No recent fevers. History of WPW.      My care coordination activities today include oversight of planned lab studies, oversight of planned radiographic studies, oversight of medication changes, discussion with BMT team-members and discussion with consultants.      My total floor time today was at least 35 minutes, greater than 50% of which was counseling and coordination of care.          Candido Joshua MD   Professor, Dept of Pediatrics  Division of Blood and Marrow Transplantation

## 2018-11-10 NOTE — PLAN OF CARE
Problem: Stem Cell/Bone Marrow Transplant (Adult)  Goal: Signs and Symptoms of Listed Potential Problems Will be Absent, Minimized or Managed (Stem Cell/Bone Marrow Transplant)  Signs and symptoms of listed potential problems will be absent, minimized or managed by discharge/transition of care (reference Stem Cell/Bone Marrow Transplant (Adult) CPG).   Outcome: No Change  AF. VSS. Lung sounds clear on RA. No complaints of nausea. Pt complained of mouth pain x1 but otherwise stated he was comfortable. On morphine PCA, took 4 bumps overnight. Tacro gtt remains unchanged. TPN/lipids infusing. Voiding good amounts, no stool overnight. Mg replaced. Hourly rounding completed. Continue with POC.

## 2018-11-10 NOTE — PLAN OF CARE
Problem: Patient Care Overview  Goal: Plan of Care/Patient Progress Review  Outcome: Declining  Afebrile. OVSS. Lungs clear.  Complaining of  Mouth pain, pca  Increased with relief.  Emesis after medications.  Bloody nose this am, stopped after holding pressure.  Hourly rounding done. POC followed.

## 2018-11-10 NOTE — PLAN OF CARE
Problem: Patient Care Overview  Goal: Plan of Care/Patient Progress Review  Outcome: No Change  Afebrile.  VSS.  Report of emesis X2, pt refuses antiemetics, states relief after vomiting.  Pt c/o pain to mouth and rectum, given PRN morphine, then morphine PCA started per order.  Pt applying barrier cream to bottom, states not helping.  MD at bedside this evening, discussed pt's rectal pain, MD will assess rectum this evening when convenient for pt.  Pt's mother at bedside this AM and this evening, pt father here middle of day, supportive of pt, updated on plan of care.

## 2018-11-11 LAB
ANION GAP SERPL CALCULATED.3IONS-SCNC: 5 MMOL/L (ref 3–14)
BLD PROD TYP BPU: NORMAL
BLD PROD TYP BPU: NORMAL
BLD UNIT ID BPU: 0
BLOOD PRODUCT CODE: NORMAL
BPU ID: NORMAL
BUN SERPL-MCNC: 16 MG/DL (ref 7–30)
CALCIUM SERPL-MCNC: 8.7 MG/DL (ref 8.5–10.1)
CHLORIDE SERPL-SCNC: 106 MMOL/L (ref 94–109)
CMV DNA SPEC NAA+PROBE-ACNC: NORMAL [IU]/ML
CMV DNA SPEC NAA+PROBE-LOG#: NORMAL {LOG_IU}/ML
CO2 SERPL-SCNC: 25 MMOL/L (ref 20–32)
CREAT SERPL-MCNC: 0.6 MG/DL (ref 0.66–1.25)
DIFFERENTIAL METHOD BLD: ABNORMAL
ERYTHROCYTE [DISTWIDTH] IN BLOOD BY AUTOMATED COUNT: 12.1 % (ref 10–15)
GFR SERPL CREATININE-BSD FRML MDRD: >90 ML/MIN/1.7M2
GLUCOSE SERPL-MCNC: 112 MG/DL (ref 70–99)
HCT VFR BLD AUTO: 25.2 % (ref 40–53)
HGB BLD-MCNC: 9.2 G/DL (ref 13.3–17.7)
MCH RBC QN AUTO: 29.8 PG (ref 26.5–33)
MCHC RBC AUTO-ENTMCNC: 36.5 G/DL (ref 31.5–36.5)
MCV RBC AUTO: 82 FL (ref 78–100)
NUM BPU REQUESTED: 1
PLATELET # BLD AUTO: 22 10E9/L (ref 150–450)
POTASSIUM SERPL-SCNC: 4.7 MMOL/L (ref 3.4–5.3)
RBC # BLD AUTO: 3.09 10E12/L (ref 4.4–5.9)
SODIUM SERPL-SCNC: 136 MMOL/L (ref 133–144)
SPECIMEN SOURCE: NORMAL
TACROLIMUS BLD-MCNC: 9 UG/L (ref 5–15)
TME LAST DOSE: NORMAL H
TRANSFUSION STATUS PATIENT QL: NORMAL
TRANSFUSION STATUS PATIENT QL: NORMAL
WBC # BLD AUTO: 0 10E9/L (ref 4–11)

## 2018-11-11 PROCEDURE — 25000125 ZZHC RX 250: Performed by: PEDIATRICS

## 2018-11-11 PROCEDURE — 25000128 H RX IP 250 OP 636: Performed by: PEDIATRICS

## 2018-11-11 PROCEDURE — P9037 PLATE PHERES LEUKOREDU IRRAD: HCPCS | Performed by: PEDIATRICS

## 2018-11-11 PROCEDURE — 80048 BASIC METABOLIC PNL TOTAL CA: CPT | Performed by: NURSE PRACTITIONER

## 2018-11-11 PROCEDURE — 85027 COMPLETE CBC AUTOMATED: CPT

## 2018-11-11 PROCEDURE — 20000002 ZZH R&B BMT INTERMEDIATE

## 2018-11-11 PROCEDURE — 25000128 H RX IP 250 OP 636: Performed by: NURSE PRACTITIONER

## 2018-11-11 PROCEDURE — 80197 ASSAY OF TACROLIMUS: CPT | Performed by: PEDIATRICS

## 2018-11-11 PROCEDURE — C9113 INJ PANTOPRAZOLE SODIUM, VIA: HCPCS | Performed by: PEDIATRICS

## 2018-11-11 PROCEDURE — C9399 UNCLASSIFIED DRUGS OR BIOLOG: HCPCS | Performed by: PEDIATRICS

## 2018-11-11 PROCEDURE — 87040 BLOOD CULTURE FOR BACTERIA: CPT | Performed by: NURSE PRACTITIONER

## 2018-11-11 PROCEDURE — 25000132 ZZH RX MED GY IP 250 OP 250 PS 637: Performed by: NURSE PRACTITIONER

## 2018-11-11 PROCEDURE — 87103 BLOOD FUNGUS CULTURE: CPT | Performed by: NURSE PRACTITIONER

## 2018-11-11 PROCEDURE — 25000125 ZZHC RX 250: Performed by: NURSE PRACTITIONER

## 2018-11-11 RX ORDER — OXYMETAZOLINE HYDROCHLORIDE 0.05 G/100ML
3 SPRAY NASAL 2 TIMES DAILY PRN
Status: DISCONTINUED | OUTPATIENT
Start: 2018-11-11 | End: 2018-11-21

## 2018-11-11 RX ADMIN — Medication 2 G: at 13:59

## 2018-11-11 RX ADMIN — TACROLIMUS 0.04 MG/KG/DAY: 5 INJECTION, SOLUTION INTRAVENOUS at 19:45

## 2018-11-11 RX ADMIN — DEFIBROTIDE SODIUM 400 MG: 80 INJECTION, SOLUTION INTRAVENOUS at 17:40

## 2018-11-11 RX ADMIN — MYCOPHENOLATE MOFETIL 960 MG: 500 INJECTION, POWDER, LYOPHILIZED, FOR SOLUTION INTRAVENOUS at 05:23

## 2018-11-11 RX ADMIN — URSODIOL 300 MG: 300 CAPSULE ORAL at 19:56

## 2018-11-11 RX ADMIN — GRANISETRON HYDROCHLORIDE 1 MG: 1 INJECTION INTRAVENOUS at 07:45

## 2018-11-11 RX ADMIN — Medication 2 G: at 20:57

## 2018-11-11 RX ADMIN — LORAZEPAM 1 MG: 2 INJECTION INTRAMUSCULAR; INTRAVENOUS at 10:30

## 2018-11-11 RX ADMIN — ACYCLOVIR SODIUM 650 MG: 50 INJECTION, SOLUTION INTRAVENOUS at 00:03

## 2018-11-11 RX ADMIN — GRANISETRON HYDROCHLORIDE 1 MG: 1 INJECTION INTRAVENOUS at 19:52

## 2018-11-11 RX ADMIN — ACYCLOVIR SODIUM 650 MG: 50 INJECTION, SOLUTION INTRAVENOUS at 23:41

## 2018-11-11 RX ADMIN — ACYCLOVIR SODIUM 650 MG: 50 INJECTION, SOLUTION INTRAVENOUS at 07:45

## 2018-11-11 RX ADMIN — ACYCLOVIR SODIUM 650 MG: 50 INJECTION, SOLUTION INTRAVENOUS at 17:40

## 2018-11-11 RX ADMIN — Medication 1 ML: at 20:05

## 2018-11-11 RX ADMIN — LORAZEPAM 1 MG: 2 INJECTION INTRAMUSCULAR; INTRAVENOUS at 03:46

## 2018-11-11 RX ADMIN — PHYTONADIONE: 1 INJECTION, EMULSION INTRAMUSCULAR; INTRAVENOUS; SUBCUTANEOUS at 19:45

## 2018-11-11 RX ADMIN — LORAZEPAM 1 MG: 2 INJECTION INTRAMUSCULAR; INTRAVENOUS at 23:59

## 2018-11-11 RX ADMIN — Medication 350 MCG: at 20:00

## 2018-11-11 RX ADMIN — DEFIBROTIDE SODIUM 400 MG: 80 INJECTION, SOLUTION INTRAVENOUS at 23:41

## 2018-11-11 RX ADMIN — PANTOPRAZOLE SODIUM 40 MG: 40 INJECTION, POWDER, FOR SOLUTION INTRAVENOUS at 07:45

## 2018-11-11 RX ADMIN — I.V. FAT EMULSION 240 ML: 20 EMULSION INTRAVENOUS at 20:07

## 2018-11-11 RX ADMIN — DEFIBROTIDE SODIUM 400 MG: 80 INJECTION, SOLUTION INTRAVENOUS at 07:46

## 2018-11-11 RX ADMIN — MORPHINE SULFATE: 5 INJECTION, SOLUTION INTRAVENOUS at 18:00

## 2018-11-11 RX ADMIN — MYCOPHENOLATE MOFETIL 960 MG: 500 INJECTION, POWDER, LYOPHILIZED, FOR SOLUTION INTRAVENOUS at 21:20

## 2018-11-11 RX ADMIN — DEFIBROTIDE SODIUM 400 MG: 80 INJECTION, SOLUTION INTRAVENOUS at 00:03

## 2018-11-11 RX ADMIN — Medication 0.5 ML: at 19:59

## 2018-11-11 RX ADMIN — MICAFUNGIN SODIUM 150 MG: 10 INJECTION, POWDER, LYOPHILIZED, FOR SOLUTION INTRAVENOUS at 09:45

## 2018-11-11 RX ADMIN — MYCOPHENOLATE MOFETIL 960 MG: 500 INJECTION, POWDER, LYOPHILIZED, FOR SOLUTION INTRAVENOUS at 14:57

## 2018-11-11 RX ADMIN — LORAZEPAM 1 MG: 2 INJECTION INTRAMUSCULAR; INTRAVENOUS at 16:48

## 2018-11-11 RX ADMIN — URSODIOL 300 MG: 300 CAPSULE ORAL at 07:45

## 2018-11-11 RX ADMIN — Medication 2 G: at 05:00

## 2018-11-11 RX ADMIN — DEFIBROTIDE SODIUM 400 MG: 80 INJECTION, SOLUTION INTRAVENOUS at 11:53

## 2018-11-11 NOTE — PLAN OF CARE
Problem: Patient Care Overview  Goal: Plan of Care/Patient Progress Review  Outcome: Declining  Febrile. Tachycardic.  OVSS. Lungs clear.  Complaining of abdominal cramping, 1 incontinent stool, 1 other loose/watery stool.  Ativan x2 with relief.  Taking several bumps off PCA, denying mouth pain.  Not taking oral medications.  Hourly rounding done. POC followed.

## 2018-11-11 NOTE — PROGRESS NOTES
Pediatric BMT Daily Progress Note    Interval Events: Isael had relief from his morphine PCA increase yesterday.  He received a total of 37 bumps with 22 denied.  Overnight hospitalist received reports from patient that he needs to push his button a lot in order to go up.  Hospitalist spent time educating patient that the number of bumps he takes is only one measure of his pain control, and that he should only push it when he is experiencing pain.  Per nursing notes, he reported his pain was under adequate control in the evening and overnight.    Review of Systems: Pertinent positives include those mentioned in interval events. A complete review of systems was performed and is otherwise negative.      Medications:  Please see MAR    Physical Exam:  Temp:  [99.1  F (37.3  C)-101.3  F (38.5  C)] 101  F (38.3  C)  Pulse:  [] 107  Heart Rate:  [] 111  Resp:  [16-20] 20  BP: ()/(60-70) 114/67  SpO2:  [94 %-100 %] 97 %  I/O last 3 completed shifts:  In: 2636.8 [I.V.:1276.8]  Out: 2055 [Urine:1855; Emesis/NG output:200]  GEN: Awake, but resting, answers questions, no distress   HEENT: Normocephalic, atraumatic, alopecia, sclera anicteric, non-injected, mouth with some erythema and breakdown on buccal mucosa and lateral aspects of tongue, nares patent without discharge    CARD: Heart regular rate and rhythm. No murmurs, rubs or gallops.  Cap refill 2 seconds.  RESP: Lungs clear to auscultation. No increased work of breathing, crackles or wheezes.   ABD: Non-distended, non-tender, bowel sounds hyperactive, mild tenderness  EXTREM: No edema noted  SKIN: No rashes noted    Labs:  Labs reviewed, pertinent findings BMP with BUN 16, Cr 0.6.  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 He is currently BMT Transplant Date: BMT Txp 11/2/2018 (9 days).       Artemio remains stable  and afebrile. He has worsening mucositis and breakdown of skin in groin.  He is having new abdominal cramping associated with mild increase in loose stools.      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  Conditioning per 2015-29: TBI days -4 through -1 followed by matched sibling donor transplant on 11/2   - awaiting count recovery       # Risk for GVHD: Post-transplant Cytoxan on days +3 and +4 complete   - Continue MMF   - Tacrolimus drip, level PENDING today       FEN/Renal:  # Risk for malnutrition: no history of TPN or enteral nutrition dependence   - Dietician following, minimal intake   - Cntinue TPN/lipids      # Risk for electrolyte abnormalities:  - Check daily electrolytes. Given cardiac history (WPW), keep K>3.5, Mg>1.8, iCa nml.   - Mag and K sliding scale       # Risk for renal dysfunction and fluid overload:  - monitor I/O's and daily weights       # Risk for aHUS/TA-TMA:    - monitor LDH qMonday  - monitor urine protein/creatinine qTuesday      Pulmonary:  # Risk for pulmonary toxicity secondary to chemotherapy:   - monitor respiratory function per unit routine      # Rhinorrhea: 3 mos history, mild. Sinus CT (10/22) with findings of mucosal thickening without evidence of active infection  - RVP 10/22 resulted negative      # Pneumomediastinum: finding upon work up Chest CT (10/22), asymptomatic. Per radiology, likely benign, perhaps transient.   - Patient instructed to notify care team with any SOB, dyspnea, or remarkable cough      Cardiovascular:  # Aaron-Parkinson-White Syndrome: diagnosed upon syncope in 02/2018. Asymptomatic, no interventions to date. Work up EKG c/w WPW, sinus bradycardia at 49 bpm. Work up ECHO normal with EF 68%.   Cardiology visit with Dr. Plummer (10/22): Holter revealed 10 PACs with no evidence of AV block. Normal Echocardiogram with EF 68%. Isael has hx of HR  in high 30s with sleep.  - Avoid catecholinergics as able to avoid tachycardia  - Maintain normal iCa+, mag > 1.8, K > 3.5      # risk for hypertension secondary to medications:   - PRN hydralazine available      Heme:   # Pancytopenia secondary to chemotherapy: hx of one platelet transfusion, well tolerated  - Transfuse for hemoglobin < 8, platelets < 30,000 (Defibrotide)  - No previous premedications, monitor for need  - GCSF until ANC >1500 x3      Infectious Disease:  # Risk for infection given immunocompromised status:   Active: now afebrile, but due to fever during conditioning, will continue Cefepime through engraftment  - monitor blood cultures-no growth to date  - CVL insertion site with purulent drainage but no erythema -- sent wound cx 11/5 NGTD       ID history: 3 mos hx of mild rhinorrhea. Chest/sinus CT without evidence of infection. 10/22 RVP negative.      Prophylaxis:                                                                                                                        - viral prophylaxis: CMV neg (donor CMV positive), HSV positive. Acyclovir  - fungal prophylaxis: high risk - Micafungin    - bacterial prophylaxis: Cefepime through engraftment (had fever during conditioning)      Past infections: none significant       GI:   # Nausea management:   - Scheduled medications: Kytril Q12H   - PRN medications: Ativan, Benadryl, Marinol, Phenergan    - avoiding scop patch for now due to xerostomia      # Risk for VOD: especially high risk given previous Inotuzumab therapy.   - Ursodiol TID (10 mg/kg)  - Defibrotide ppx      # Diarrhea:  Likely chemo related  - consider sending stools studies if worsens  - encouraged Ativan PRN for cramping pain    ENT:  #epistaxis: very mild, nasal mucosa dry  - Ayr gel PRN  - Vaseline to nares      Neuro:  # Mucositis/pain (anticipated):   - Continue morphine PCA  - Integrative therapy       The above plan of care was developed by and communicated to me by  the   Pediatric BMT attending physician, Dr. Candido Morales, DO  Pediatric BMT Hospitalist       BMT Attending Note:      Artemio was seen and evaluated by me today.       The significant interval history includes some increasing difficulty with mucositis.  He states he has had some more discomfort than over the past few days, including crampy abdominal pain, with some loose stool.  He is not eating, and we are continuing to modify the narcotics to assist him.  He has otherwise been stable in terms of this vital signs. There are no respiratory or other difficulties.   I have reviewed changes and data from the last 24 hours, including medications, laboratory results, vital signs, radiograph results, consultant recommendations and pathology results.       I have formulated and discussed the plan with the BMT team. I discussed the course and plan with Artemio and his mother, and answered all of their questions to the best of my ability. I counseled them regarding difficulties with pain, the new rash on the left knee (improving), and risks of opportunistic infection. No recent fevers. History of WPW.      My care coordination activities today include oversight of planned lab studies, oversight of planned radiographic studies, oversight of medication changes, discussion with BMT team-members and discussion with consultants.      My total floor time today was at least 40 minutes, greater than 50% of which was counseling and coordination of care.      Candido Joshua MD   Professor, Dept of Pediatrics  Division of Blood and Marrow Transplantation

## 2018-11-11 NOTE — PLAN OF CARE
Problem: Stem Cell/Bone Marrow Transplant (Adult)  Goal: Signs and Symptoms of Listed Potential Problems Will be Absent, Minimized or Managed (Stem Cell/Bone Marrow Transplant)  Signs and symptoms of listed potential problems will be absent, minimized or managed by discharge/transition of care (reference Stem Cell/Bone Marrow Transplant (Adult) CPG).   Outcome: No Change  Temp spiked to 101.3; MD notified. OVSS, lungs clear. Ativan x 1 for nausea. No stool.  Good urine output. States pain is managed well enough with morphine PCA. Took 17 bumps, denied 3.  Small nosebleed resolved with self-applied pressure.  Platelets transfused without complication.  Hourly rounding completed, continue plan of care.

## 2018-11-12 LAB
ALBUMIN SERPL-MCNC: 2.8 G/DL (ref 3.4–5)
ALP SERPL-CCNC: 36 U/L (ref 40–150)
ALT SERPL W P-5'-P-CCNC: 16 U/L (ref 0–70)
ANION GAP SERPL CALCULATED.3IONS-SCNC: 5 MMOL/L (ref 3–14)
AST SERPL W P-5'-P-CCNC: 18 U/L (ref 0–45)
BILIRUB DIRECT SERPL-MCNC: 0.4 MG/DL (ref 0–0.2)
BILIRUB SERPL-MCNC: 0.9 MG/DL (ref 0.2–1.3)
BLD PROD TYP BPU: NORMAL
BLD PROD TYP BPU: NORMAL
BLD UNIT ID BPU: 0
BLOOD PRODUCT CODE: NORMAL
BPU ID: NORMAL
BUN SERPL-MCNC: 16 MG/DL (ref 7–30)
C DIFF TOX B STL QL: POSITIVE
CA-I BLD-MCNC: 5.1 MG/DL (ref 4.4–5.2)
CALCIUM SERPL-MCNC: 8.7 MG/DL (ref 8.5–10.1)
CHLORIDE SERPL-SCNC: 107 MMOL/L (ref 94–109)
CMV DNA SPEC NAA+PROBE-ACNC: NORMAL [IU]/ML
CMV DNA SPEC NAA+PROBE-LOG#: NORMAL {LOG_IU}/ML
CO2 SERPL-SCNC: 24 MMOL/L (ref 20–32)
CREAT SERPL-MCNC: 0.57 MG/DL (ref 0.66–1.25)
DIFFERENTIAL METHOD BLD: ABNORMAL
ERYTHROCYTE [DISTWIDTH] IN BLOOD BY AUTOMATED COUNT: 12.1 % (ref 10–15)
FIBRINOGEN PPP-MCNC: 634 MG/DL (ref 200–420)
GFR SERPL CREATININE-BSD FRML MDRD: >90 ML/MIN/1.7M2
GLUCOSE SERPL-MCNC: 111 MG/DL (ref 70–99)
HCT VFR BLD AUTO: 22.7 % (ref 40–53)
HGB BLD-MCNC: 8.2 G/DL (ref 13.3–17.7)
INR PPP: 1.18 (ref 0.86–1.14)
Lab: NORMAL
MAGNESIUM SERPL-MCNC: 1.5 MG/DL (ref 1.6–2.3)
MAGNESIUM SERPL-MCNC: 2.1 MG/DL (ref 1.6–2.3)
MCH RBC QN AUTO: 29.5 PG (ref 26.5–33)
MCHC RBC AUTO-ENTMCNC: 36.1 G/DL (ref 31.5–36.5)
MCV RBC AUTO: 82 FL (ref 78–100)
NUM BPU REQUESTED: 1
PHOSPHATE SERPL-MCNC: 3.3 MG/DL (ref 2.5–4.5)
PLATELET # BLD AUTO: 23 10E9/L (ref 150–450)
POTASSIUM SERPL-SCNC: 4.3 MMOL/L (ref 3.4–5.3)
PREALB SERPL IA-MCNC: 14 MG/DL (ref 15–45)
PROT SERPL-MCNC: 6.2 G/DL (ref 6.8–8.8)
RBC # BLD AUTO: 2.78 10E12/L (ref 4.4–5.9)
SODIUM SERPL-SCNC: 136 MMOL/L (ref 133–144)
SPECIMEN SOURCE: ABNORMAL
SPECIMEN SOURCE: NORMAL
SPECIMEN SOURCE: NORMAL
TACROLIMUS BLD-MCNC: 9.6 UG/L (ref 5–15)
TME LAST DOSE: NORMAL H
TRANSFUSION STATUS PATIENT QL: NORMAL
TRANSFUSION STATUS PATIENT QL: NORMAL
TRIGL SERPL-MCNC: 59 MG/DL
WBC # BLD AUTO: 0 10E9/L (ref 4–11)
YEAST SPEC QL CULT: NORMAL

## 2018-11-12 PROCEDURE — 25000132 ZZH RX MED GY IP 250 OP 250 PS 637: Performed by: NURSE PRACTITIONER

## 2018-11-12 PROCEDURE — 85384 FIBRINOGEN ACTIVITY: CPT | Performed by: NURSE PRACTITIONER

## 2018-11-12 PROCEDURE — 25000128 H RX IP 250 OP 636: Performed by: PEDIATRICS

## 2018-11-12 PROCEDURE — 85610 PROTHROMBIN TIME: CPT | Performed by: NURSE PRACTITIONER

## 2018-11-12 PROCEDURE — 25000128 H RX IP 250 OP 636: Performed by: NURSE PRACTITIONER

## 2018-11-12 PROCEDURE — 87493 C DIFF AMPLIFIED PROBE: CPT | Performed by: NURSE PRACTITIONER

## 2018-11-12 PROCEDURE — C9399 UNCLASSIFIED DRUGS OR BIOLOG: HCPCS | Performed by: PEDIATRICS

## 2018-11-12 PROCEDURE — 87081 CULTURE SCREEN ONLY: CPT | Performed by: NURSE PRACTITIONER

## 2018-11-12 PROCEDURE — C9113 INJ PANTOPRAZOLE SODIUM, VIA: HCPCS | Performed by: PEDIATRICS

## 2018-11-12 PROCEDURE — 25000125 ZZHC RX 250: Performed by: PEDIATRICS

## 2018-11-12 PROCEDURE — 25000125 ZZHC RX 250: Performed by: NURSE PRACTITIONER

## 2018-11-12 PROCEDURE — 82247 BILIRUBIN TOTAL: CPT | Performed by: NURSE PRACTITIONER

## 2018-11-12 PROCEDURE — 82330 ASSAY OF CALCIUM: CPT | Performed by: PEDIATRICS

## 2018-11-12 PROCEDURE — 84134 ASSAY OF PREALBUMIN: CPT | Performed by: NURSE PRACTITIONER

## 2018-11-12 PROCEDURE — 83735 ASSAY OF MAGNESIUM: CPT | Performed by: NURSE PRACTITIONER

## 2018-11-12 PROCEDURE — 82248 BILIRUBIN DIRECT: CPT | Performed by: NURSE PRACTITIONER

## 2018-11-12 PROCEDURE — 84100 ASSAY OF PHOSPHORUS: CPT | Performed by: NURSE PRACTITIONER

## 2018-11-12 PROCEDURE — 87102 FUNGUS ISOLATION CULTURE: CPT | Performed by: NURSE PRACTITIONER

## 2018-11-12 PROCEDURE — 80053 COMPREHEN METABOLIC PANEL: CPT | Performed by: NURSE PRACTITIONER

## 2018-11-12 PROCEDURE — 84478 ASSAY OF TRIGLYCERIDES: CPT | Performed by: PEDIATRICS

## 2018-11-12 PROCEDURE — 85027 COMPLETE CBC AUTOMATED: CPT

## 2018-11-12 PROCEDURE — P9037 PLATE PHERES LEUKOREDU IRRAD: HCPCS | Performed by: PEDIATRICS

## 2018-11-12 PROCEDURE — 84075 ASSAY ALKALINE PHOSPHATASE: CPT | Performed by: NURSE PRACTITIONER

## 2018-11-12 PROCEDURE — 80197 ASSAY OF TACROLIMUS: CPT | Performed by: PEDIATRICS

## 2018-11-12 PROCEDURE — 25800025 ZZH RX 258: Performed by: PEDIATRICS

## 2018-11-12 PROCEDURE — 20000002 ZZH R&B BMT INTERMEDIATE

## 2018-11-12 RX ORDER — VANCOMYCIN HYDROCHLORIDE 50 MG/ML
125 KIT ORAL 4 TIMES DAILY
Status: DISCONTINUED | OUTPATIENT
Start: 2018-11-12 | End: 2018-11-22

## 2018-11-12 RX ADMIN — ACYCLOVIR SODIUM 650 MG: 50 INJECTION, SOLUTION INTRAVENOUS at 16:37

## 2018-11-12 RX ADMIN — ACYCLOVIR SODIUM 650 MG: 50 INJECTION, SOLUTION INTRAVENOUS at 07:29

## 2018-11-12 RX ADMIN — URSODIOL 300 MG: 300 CAPSULE ORAL at 13:45

## 2018-11-12 RX ADMIN — DEFIBROTIDE SODIUM 400 MG: 80 INJECTION, SOLUTION INTRAVENOUS at 11:21

## 2018-11-12 RX ADMIN — Medication 2 G: at 05:04

## 2018-11-12 RX ADMIN — PHYTONADIONE: 1 INJECTION, EMULSION INTRAMUSCULAR; INTRAVENOUS; SUBCUTANEOUS at 20:29

## 2018-11-12 RX ADMIN — DEFIBROTIDE SODIUM 400 MG: 80 INJECTION, SOLUTION INTRAVENOUS at 07:30

## 2018-11-12 RX ADMIN — DEFIBROTIDE SODIUM 400 MG: 80 INJECTION, SOLUTION INTRAVENOUS at 17:22

## 2018-11-12 RX ADMIN — MYCOPHENOLATE MOFETIL 960 MG: 500 INJECTION, POWDER, LYOPHILIZED, FOR SOLUTION INTRAVENOUS at 05:04

## 2018-11-12 RX ADMIN — URSODIOL 300 MG: 300 CAPSULE ORAL at 07:29

## 2018-11-12 RX ADMIN — MICAFUNGIN SODIUM 150 MG: 10 INJECTION, POWDER, LYOPHILIZED, FOR SOLUTION INTRAVENOUS at 09:48

## 2018-11-12 RX ADMIN — GRANISETRON HYDROCHLORIDE 1 MG: 1 INJECTION INTRAVENOUS at 07:30

## 2018-11-12 RX ADMIN — Medication 350 MCG: at 20:29

## 2018-11-12 RX ADMIN — I.V. FAT EMULSION 240 ML: 20 EMULSION INTRAVENOUS at 20:29

## 2018-11-12 RX ADMIN — TACROLIMUS 0.04 MG/KG/DAY: 5 INJECTION, SOLUTION INTRAVENOUS at 20:53

## 2018-11-12 RX ADMIN — Medication 3 ML: at 21:45

## 2018-11-12 RX ADMIN — PANTOPRAZOLE SODIUM 40 MG: 40 INJECTION, POWDER, FOR SOLUTION INTRAVENOUS at 07:29

## 2018-11-12 RX ADMIN — LORAZEPAM 1 MG: 2 INJECTION INTRAMUSCULAR; INTRAVENOUS at 06:51

## 2018-11-12 RX ADMIN — URSODIOL 300 MG: 300 CAPSULE ORAL at 20:29

## 2018-11-12 RX ADMIN — GRANISETRON HYDROCHLORIDE 1 MG: 1 INJECTION INTRAVENOUS at 20:29

## 2018-11-12 RX ADMIN — Medication 2 ML: at 20:29

## 2018-11-12 RX ADMIN — POTASSIUM CHLORIDE, DEXTROSE MONOHYDRATE AND SODIUM CHLORIDE: 75; 5; 450 INJECTION, SOLUTION INTRAVENOUS at 20:52

## 2018-11-12 RX ADMIN — Medication 2 G: at 04:34

## 2018-11-12 RX ADMIN — MYCOPHENOLATE MOFETIL 960 MG: 500 INJECTION, POWDER, LYOPHILIZED, FOR SOLUTION INTRAVENOUS at 13:45

## 2018-11-12 RX ADMIN — Medication 2 G: at 13:45

## 2018-11-12 RX ADMIN — LORAZEPAM 1 MG: 2 INJECTION INTRAMUSCULAR; INTRAVENOUS at 20:52

## 2018-11-12 RX ADMIN — Medication 2 G: at 21:46

## 2018-11-12 RX ADMIN — TACROLIMUS 0.04 MG/KG/DAY: 5 INJECTION, SOLUTION INTRAVENOUS at 16:38

## 2018-11-12 RX ADMIN — MYCOPHENOLATE MOFETIL 960 MG: 500 INJECTION, POWDER, LYOPHILIZED, FOR SOLUTION INTRAVENOUS at 22:19

## 2018-11-12 NOTE — PLAN OF CARE
Problem: Stem Cell/Bone Marrow Transplant (Adult)  Goal: Signs and Symptoms of Listed Potential Problems Will be Absent, Minimized or Managed (Stem Cell/Bone Marrow Transplant)  Signs and symptoms of listed potential problems will be absent, minimized or managed by discharge/transition of care (reference Stem Cell/Bone Marrow Transplant (Adult) CPG).   Outcome: No Change  Artemio was afebrile. VSS. Lungs clear. Increased oral secretions. Ativan x 2 for nausea and cramping. Loose stool x 3. Unmeasured urine output with stools, urgency and cramping with stool.   Magnesium replaced, platelets transfused without complication.  PCA use : 15 bumps, 5 denied.   Hourly rounding completed, continue plan of care.

## 2018-11-12 NOTE — PLAN OF CARE
"Afebrile. VSS. Lungs clear.  States pain is \"managable\" on PCA. No nausea or vomiting this shift. No PRN's. Continuing to have loose stools, however, is refusing to stool in a hat for a culture. RN educated patient and pt stated he will try to save next one. Pt completed a wipe down shower today with education and encouragement from RN and NST. Parents at bedside. Hourly rounding completed. Continue POC   "

## 2018-11-12 NOTE — PROGRESS NOTES
Pediatric BMT Daily Progress Note    Interval Events: Afebrile. Frequent watery stools with notable abdominal cramping over past 24 hours. Ativan x 4 and PCA x 30 with 15 denied over past 24 hours.     Review of Systems: Pertinent positives include those mentioned in interval events. A complete review of systems was performed and is otherwise negative.      Medications:  Please see MAR    Physical Exam:  Temp:  [97.6  F (36.4  C)-100.6  F (38.1  C)] 97.6  F (36.4  C)  Pulse:  [] 105  Heart Rate:  [104] 104  Resp:  [16-24] 24  BP: ()/(55-63) 104/55  SpO2:  [95 %-99 %] 97 %  I/O last 3 completed shifts:  In: 3039.3 [I.V.:1185.3]  Out: 2184 [Urine:2059; Stool:125]  GEN: Awake, but resting, answers questions, no distress. Family present.   HEENT: Normocephalic, atraumatic, alopecia, sclera anicteric, non-injected, mouth with some erythema and breakdown on buccal mucosa and lateral aspects of tongue, nares patent without discharge    CARD: Heart regular rate and rhythm. No murmurs, rubs or gallops.  Cap refill 2 seconds.  RESP: Lungs clear to auscultation. No increased work of breathing, crackles or wheezes.   ABD: Non-distended, non-tender, bowel sounds hyperactive, mild tenderness  EXTREM: No edema noted  SKIN: No rashes noted    Labs:  Labs reviewed, pertinent findings BMP with BUN 16, Cr 0.57.  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 He is currently BMT Transplant Date: BMT Txp 11/2/2018 (10 days).       Afebrile. Mucositis pain, using PCA frequently. Increase in frequency of watery stools and increased intensity of associated abdominal cramping over past 24 hours. Plan to send c.diff stool study today. Generalized pruritis, proposed narcan gtt, will see if Isael is ok with port access ( Galvan line space issues if adding narcan).      BMT:  # High risk B cell ALL  (CNS negative) + JAK2 activation/BMT: Latest bone marrow biopsy (10/15) MRD 0.006%; Ruxolitinib continued until admission.  Conditioning per 2015-29: TBI days -4 through -1 followed by matched sibling donor transplant on 11/2   - awaiting count recovery       # Risk for GVHD: Post-transplant Cytoxan on days +3 and +4 complete   - Continue MMF   - Tacrolimus drip, level 197 11/10. Recheck pending today.    FEN/Renal:  # Risk for malnutrition: continue TPN/IL  - Dietician following, minimal intake       # Risk for electrolyte abnormalities:  - Check daily electrolytes. Given cardiac history (WPW), keep K>3.5, Mg>1.8, iCa nml.   - Mag and K sliding scale       # Risk for renal dysfunction and fluid overload:  - monitor I/O's and daily weights       # Risk for aHUS/TA-TMA:    - monitor LDH qMonday  - monitor urine protein/creatinine qTuesday      Pulmonary:  # Risk for pulmonary toxicity secondary to chemotherapy:   - monitor respiratory function per unit routine      # Rhinorrhea: 3 mos history, mild. Sinus CT (10/22) with findings of mucosal thickening without evidence of active infection.  RVP negative.      # Pneumomediastinum: finding upon work up Chest CT (10/22), asymptomatic. Per radiology, likely benign, perhaps transient.   - Patient instructed to notify care team with any SOB, dyspnea, or remarkable cough      Cardiovascular:  # Aaron-Parkinson-White Syndrome: diagnosed upon syncope in 02/2018. Asymptomatic, no interventions to date. Work up EKG c/w WPW, sinus bradycardia at 49 bpm. Work up ECHO normal with EF 68%.   Cardiology visit with Dr. Plummer (10/22): Holter revealed 10 PACs with no evidence of AV block. Normal Echocardiogram with EF 68%. Isael has hx of HR in high 30s with sleep.  - Avoid catecholinergics as able to avoid tachycardia  - Maintain normal iCa+, mag > 1.8, K > 3.5      # risk for hypertension secondary to medications:   - PRN hydralazine available      Heme:   # Pancytopenia secondary  to chemotherapy: hx 1 platelet transfusion, well tolerated  - Transfuse for hemoglobin < 8, platelets < 30,000 (Defibrotide), no premedications  - GCSF until ANC >1500 x3      Infectious Disease:  # Risk for infection given immunocompromised status:   Active: Afebrile, fever during conditioning, continue Cefepime through engraftment  - CVL insertion site with purulent drainage but no erythema -- sent wound cx 11/5 NGTD   - Diarrhea with abdominal cramping, concern for infection. Sent C.diff studies 11/12, pending.      ID history: 3 mos hx of mild rhinorrhea. Chest/sinus CT without evidence of infection. 10/22 RVP negative.      Prophylaxis:                                                                                                                        - viral prophylaxis: CMV neg (donor CMV positive), HSV positive. Acyclovir  - fungal prophylaxis: high risk - Micafungin    - bacterial prophylaxis: Cefepime through engraftment       Past infections: none significant       GI:   # Nausea management:   - Scheduled medications: Kytril Q12H   - PRN medications: Ativan, Benadryl, Marinol, Phenergan    - avoiding scop patch for now due to xerostomia      # Risk for VOD: especially high risk  previous Inotuzumab therapy.  Defibrotide ppx  - Ursodiol TID (10 mg/kg)      # Diarrhea:  Worse past 24-48 hours, watery stools with significant associated abdominal cramping. Sent C.diff study 11/12.    ENT:  #epistaxis: very mild, nasal mucosa dry  - Ayr gel PRN  - Vaseline to nares      Neuro:  # Mucositis/pain:   - Continue morphine continuous infusion with PCA per patient  - Integrative therapy     # Generalize pruritis: proposed narcan gtt today. Would need to access port due to Galvan line space issues, will let Isael decide.    Derm:  # Skin breakdown, anus: continue applying barrier cream. Small amount of  blood when wiping after BM today.      The above plan of care was developed by and communicated to me by the    Pediatric BMT attending physician, Dr. Candido Workman, BENJA  [facility]  Pediatric Blood and Marrow Transplant  [phone]  Pager  [phone]  BMT [facility]  [phone]  BMT hospital workroom        BMT Attending Note:      Artemio was seen and evaluated by me today.       The significant interval history includes continued difficulty with mucositis.  He states he still has discomfort, including crampy abdominal pain with some loose stool, but this does not appear much different than over the last few days.  We are continuing to modify the narcotics to assist him.  He has otherwise been stable in terms of this vital signs. There are no respiratory or other difficulties.   I have reviewed changes and data from the last 24 hours, including medications, laboratory results, vital signs, radiograph results, consultant recommendations and pathology results.       I have formulated and discussed the plan with the BMT team. I discussed the course and plan with Artemio and his mother, and answered all of their questions to the best of my ability. I counseled them regarding difficulties with pain, the new rash on the left knee (improving), and risks of opportunistic infection. No recent fevers. History of WPW.      My care coordination activities today include oversight of planned lab studies, oversight of planned radiographic studies, oversight of medication changes, discussion with BMT team-members and discussion with consultants.      My total floor time today was at least 40 minutes, greater than 50% of which was counseling and coordination of care.      Candido Joshua MD   Professor, Dept of Pediatrics  Division of Blood and Marrow Transplantation

## 2018-11-12 NOTE — PLAN OF CARE
Problem: Patient Care Overview  Goal: Plan of Care/Patient Progress Review  PT: Cancel - Patient declining PT today, reporting he has been doing his exercise program and walking to/from the bathroom throughout the day. Will continue to check-in with patient to ensure patient is maintain activity level.

## 2018-11-13 LAB
ANION GAP SERPL CALCULATED.3IONS-SCNC: 7 MMOL/L (ref 3–14)
BLD PROD TYP BPU: NORMAL
BLD UNIT ID BPU: 0
BLOOD PRODUCT CODE: NORMAL
BPU ID: NORMAL
BUN SERPL-MCNC: 16 MG/DL (ref 7–30)
CALCIUM SERPL-MCNC: 8.1 MG/DL (ref 8.5–10.1)
CHLORIDE SERPL-SCNC: 108 MMOL/L (ref 94–109)
CO2 SERPL-SCNC: 23 MMOL/L (ref 20–32)
CREAT SERPL-MCNC: 0.59 MG/DL (ref 0.66–1.25)
DIFFERENTIAL METHOD BLD: ABNORMAL
ERYTHROCYTE [DISTWIDTH] IN BLOOD BY AUTOMATED COUNT: 11.9 % (ref 10–15)
GFR SERPL CREATININE-BSD FRML MDRD: >90 ML/MIN/1.7M2
GLUCOSE SERPL-MCNC: 107 MG/DL (ref 70–99)
HCT VFR BLD AUTO: 20.4 % (ref 40–53)
HGB BLD-MCNC: 7.4 G/DL (ref 13.3–17.7)
MCH RBC QN AUTO: 28.9 PG (ref 26.5–33)
MCHC RBC AUTO-ENTMCNC: 36.3 G/DL (ref 31.5–36.5)
MCV RBC AUTO: 80 FL (ref 78–100)
PLATELET # BLD AUTO: 39 10E9/L (ref 150–450)
POTASSIUM SERPL-SCNC: 3.8 MMOL/L (ref 3.4–5.3)
RBC # BLD AUTO: 2.56 10E12/L (ref 4.4–5.9)
SODIUM SERPL-SCNC: 138 MMOL/L (ref 133–144)
TACROLIMUS BLD-MCNC: 15.6 UG/L (ref 5–15)
TME LAST DOSE: ABNORMAL H
TRANSFUSION STATUS PATIENT QL: NORMAL
TRANSFUSION STATUS PATIENT QL: NORMAL
WBC # BLD AUTO: 0 10E9/L (ref 4–11)

## 2018-11-13 PROCEDURE — C9113 INJ PANTOPRAZOLE SODIUM, VIA: HCPCS | Performed by: PEDIATRICS

## 2018-11-13 PROCEDURE — 86923 COMPATIBILITY TEST ELECTRIC: CPT | Performed by: NURSE PRACTITIONER

## 2018-11-13 PROCEDURE — 25000125 ZZHC RX 250: Performed by: PEDIATRICS

## 2018-11-13 PROCEDURE — 87529 HSV DNA AMP PROBE: CPT | Performed by: PEDIATRICS

## 2018-11-13 PROCEDURE — 25000128 H RX IP 250 OP 636: Performed by: PEDIATRICS

## 2018-11-13 PROCEDURE — 25000132 ZZH RX MED GY IP 250 OP 250 PS 637: Performed by: PEDIATRICS

## 2018-11-13 PROCEDURE — 25000128 H RX IP 250 OP 636: Performed by: NURSE PRACTITIONER

## 2018-11-13 PROCEDURE — 85027 COMPLETE CBC AUTOMATED: CPT

## 2018-11-13 PROCEDURE — 86901 BLOOD TYPING SEROLOGIC RH(D): CPT | Performed by: NURSE PRACTITIONER

## 2018-11-13 PROCEDURE — 20000002 ZZH R&B BMT INTERMEDIATE

## 2018-11-13 PROCEDURE — 25800025 ZZH RX 258: Performed by: PEDIATRICS

## 2018-11-13 PROCEDURE — 25000125 ZZHC RX 250: Performed by: NURSE PRACTITIONER

## 2018-11-13 PROCEDURE — P9040 RBC LEUKOREDUCED IRRADIATED: HCPCS | Performed by: NURSE PRACTITIONER

## 2018-11-13 PROCEDURE — 86900 BLOOD TYPING SEROLOGIC ABO: CPT | Performed by: NURSE PRACTITIONER

## 2018-11-13 PROCEDURE — 86850 RBC ANTIBODY SCREEN: CPT | Performed by: NURSE PRACTITIONER

## 2018-11-13 PROCEDURE — C9399 UNCLASSIFIED DRUGS OR BIOLOG: HCPCS | Performed by: PEDIATRICS

## 2018-11-13 PROCEDURE — 25000132 ZZH RX MED GY IP 250 OP 250 PS 637: Performed by: NURSE PRACTITIONER

## 2018-11-13 PROCEDURE — 80048 BASIC METABOLIC PNL TOTAL CA: CPT | Performed by: NURSE PRACTITIONER

## 2018-11-13 PROCEDURE — 80197 ASSAY OF TACROLIMUS: CPT | Performed by: PEDIATRICS

## 2018-11-13 PROCEDURE — 87102 FUNGUS ISOLATION CULTURE: CPT | Performed by: NURSE PRACTITIONER

## 2018-11-13 RX ORDER — DIPHENHYDRAMINE HYDROCHLORIDE 50 MG/ML
25 INJECTION INTRAMUSCULAR; INTRAVENOUS EVERY 6 HOURS PRN
Status: DISCONTINUED | OUTPATIENT
Start: 2018-11-13 | End: 2018-11-14

## 2018-11-13 RX ADMIN — DEFIBROTIDE SODIUM 400 MG: 80 INJECTION, SOLUTION INTRAVENOUS at 11:32

## 2018-11-13 RX ADMIN — GRANISETRON HYDROCHLORIDE 1 MG: 1 INJECTION INTRAVENOUS at 20:21

## 2018-11-13 RX ADMIN — MORPHINE SULFATE: 5 INJECTION, SOLUTION INTRAVENOUS at 16:36

## 2018-11-13 RX ADMIN — Medication 1 ML: at 20:30

## 2018-11-13 RX ADMIN — DEFIBROTIDE SODIUM 400 MG: 80 INJECTION, SOLUTION INTRAVENOUS at 07:45

## 2018-11-13 RX ADMIN — DEFIBROTIDE SODIUM 400 MG: 80 INJECTION, SOLUTION INTRAVENOUS at 23:11

## 2018-11-13 RX ADMIN — VANCOMYCIN HYDROCHLORIDE 125 MG: KIT at 11:32

## 2018-11-13 RX ADMIN — PHYTONADIONE: 1 INJECTION, EMULSION INTRAMUSCULAR; INTRAVENOUS; SUBCUTANEOUS at 20:20

## 2018-11-13 RX ADMIN — ACYCLOVIR SODIUM 650 MG: 50 INJECTION, SOLUTION INTRAVENOUS at 00:58

## 2018-11-13 RX ADMIN — VANCOMYCIN HYDROCHLORIDE 125 MG: KIT at 00:57

## 2018-11-13 RX ADMIN — I.V. FAT EMULSION 240 ML: 20 EMULSION INTRAVENOUS at 20:18

## 2018-11-13 RX ADMIN — MYCOPHENOLATE MOFETIL 960 MG: 500 INJECTION, POWDER, LYOPHILIZED, FOR SOLUTION INTRAVENOUS at 21:19

## 2018-11-13 RX ADMIN — Medication 2 G: at 05:10

## 2018-11-13 RX ADMIN — VANCOMYCIN HYDROCHLORIDE 125 MG: KIT at 07:45

## 2018-11-13 RX ADMIN — DEFIBROTIDE SODIUM 400 MG: 80 INJECTION, SOLUTION INTRAVENOUS at 18:19

## 2018-11-13 RX ADMIN — PANTOPRAZOLE SODIUM 40 MG: 40 INJECTION, POWDER, FOR SOLUTION INTRAVENOUS at 07:45

## 2018-11-13 RX ADMIN — MICAFUNGIN SODIUM 150 MG: 10 INJECTION, POWDER, LYOPHILIZED, FOR SOLUTION INTRAVENOUS at 09:56

## 2018-11-13 RX ADMIN — LORAZEPAM 1 MG: 2 INJECTION INTRAMUSCULAR; INTRAVENOUS at 18:13

## 2018-11-13 RX ADMIN — DEFIBROTIDE SODIUM 400 MG: 80 INJECTION, SOLUTION INTRAVENOUS at 00:58

## 2018-11-13 RX ADMIN — GRANISETRON HYDROCHLORIDE 1 MG: 1 INJECTION INTRAVENOUS at 07:45

## 2018-11-13 RX ADMIN — POTASSIUM CHLORIDE, DEXTROSE MONOHYDRATE AND SODIUM CHLORIDE: 75; 5; 450 INJECTION, SOLUTION INTRAVENOUS at 16:37

## 2018-11-13 RX ADMIN — LORAZEPAM 1 MG: 2 INJECTION INTRAMUSCULAR; INTRAVENOUS at 08:43

## 2018-11-13 RX ADMIN — Medication 1 ML: at 20:22

## 2018-11-13 RX ADMIN — VANCOMYCIN HYDROCHLORIDE 125 MG: KIT at 15:28

## 2018-11-13 RX ADMIN — Medication 2 G: at 13:36

## 2018-11-13 RX ADMIN — TACROLIMUS 0.04 MG/KG/DAY: 5 INJECTION, SOLUTION INTRAVENOUS at 18:15

## 2018-11-13 RX ADMIN — MYCOPHENOLATE MOFETIL 960 MG: 500 INJECTION, POWDER, LYOPHILIZED, FOR SOLUTION INTRAVENOUS at 13:36

## 2018-11-13 RX ADMIN — ACYCLOVIR SODIUM 650 MG: 50 INJECTION, SOLUTION INTRAVENOUS at 23:12

## 2018-11-13 RX ADMIN — MORPHINE SULFATE: 5 INJECTION, SOLUTION INTRAVENOUS at 01:26

## 2018-11-13 RX ADMIN — URSODIOL 300 MG: 300 CAPSULE ORAL at 07:45

## 2018-11-13 RX ADMIN — MORPHINE SULFATE: 5 INJECTION, SOLUTION INTRAVENOUS at 12:06

## 2018-11-13 RX ADMIN — Medication 350 MCG: at 20:22

## 2018-11-13 RX ADMIN — DIPHENHYDRAMINE HYDROCHLORIDE 25 MG: 50 INJECTION, SOLUTION INTRAMUSCULAR; INTRAVENOUS at 18:16

## 2018-11-13 RX ADMIN — MYCOPHENOLATE MOFETIL 960 MG: 500 INJECTION, POWDER, LYOPHILIZED, FOR SOLUTION INTRAVENOUS at 05:46

## 2018-11-13 RX ADMIN — Medication 2 G: at 21:19

## 2018-11-13 RX ADMIN — ACYCLOVIR SODIUM 650 MG: 50 INJECTION, SOLUTION INTRAVENOUS at 07:45

## 2018-11-13 RX ADMIN — ACYCLOVIR SODIUM 650 MG: 50 INJECTION, SOLUTION INTRAVENOUS at 15:28

## 2018-11-13 NOTE — PROGRESS NOTES
"   11/13/18 1050   Child Life   Location BMT - acute lyphoblastic leukemia   Intervention Initial Assessment;Family Support;Supportive Check In  Child life specialist introduced self and CFL services to patient's mother Mervat outside of patient room. Mother appeared upset, frantically moving about unit. Writer provided supportive check in to mother, she explains that she is stressed, \"the c. dif really makes this even harder.. Now his body has something else to fight\" Provided supportive presence and validation. Mother understandably tearful during this conversation. Appreciative of CFL check in, however quick to collect herself and returned to patient's room. Patient's providers currently rounding on patient, did not check in with him at that time.    Family Support Comment Patient's mother is present at the hospital with patient. She is from [place]    Growth and Development Comment Did not assess patient during check in.    Outcomes/Follow Up Continue to Follow/Support     "

## 2018-11-13 NOTE — PLAN OF CARE
Afebrile. VSS, lungs clear. Diminished in bases. Pain well controlled with Morphien PCA. Took 6 bumps this shift. 2 denied. Ativan PRN given x1 for nausea. tacro gtt decreased. Pt not using hats for urine and stool but appears a decrease in stooling today. Family present throughout day. Hourly rounding completed. Continue POC

## 2018-11-13 NOTE — PLAN OF CARE
Problem: Stem Cell/Bone Marrow Transplant (Adult)  Goal: Signs and Symptoms of Listed Potential Problems Will be Absent, Minimized or Managed (Stem Cell/Bone Marrow Transplant)  Signs and symptoms of listed potential problems will be absent, minimized or managed by discharge/transition of care (reference Stem Cell/Bone Marrow Transplant (Adult) CPG).   Outcome: No Change  Pt Afebrile.  VSS. Lungs clear. Pt c/o uncontrolled pain, MD notified. PCA increased to 2 mg, pt took 14 bumps overnight with 1 denied. Pt reported pain relief after increase of PCA. Pt received RBC's overnight without complication.  Pt in C-diff precautions.  Tacro drip remains unchanged.  Hourly rounding complete.

## 2018-11-13 NOTE — PROGRESS NOTES
Pediatric BMT Daily Progress Note    Interval Events: Afebrile. C.diff positive, started on oral vancomycin. PCA dose increased last night due to increased mucositis pain, good relief, fewer PCA doses taken after dose increase. Ativan prn x 2 for abdominal cramping with good effect.     Review of Systems: Pertinent positives include those mentioned in interval events. A complete review of systems was performed and is otherwise negative.      Medications:  Please see MAR    Physical Exam:  Temp:  [98.2  F (36.8  C)-99.6  F (37.6  C)] 99.2  F (37.3  C)  Pulse:  [85-98] 98  Heart Rate:  [92-95] 95  Resp:  [16-22] 16  BP: ()/(55-69) 108/67  SpO2:  [95 %-100 %] 95 %  I/O last 3 completed shifts:  In: 3448.87 [I.V.:1476.87]  Out: 790 [Urine:790]  GEN: Awake, talkative, appears comfortable. Family present.   HEENT: Normocephalic, atraumatic, alopecia, sclera anicteric, non-injected, mouth with some erythema and breakdown on buccal mucosa and lateral aspects of tongue, nares patent without discharge    CARD: Heart regular rate and rhythm. No murmurs, rubs or gallops.  Cap refill 2 seconds.  RESP: Lungs clear to auscultation. No increased work of breathing, crackles or wheezes.   ABD: Non-distended, non-tender, bowel sounds hyperactive, mild tenderness  EXTREM: No edema noted  SKIN: No rashes noted    Labs:  Labs reviewed, pertinent findings BMP with BUN 16, Cr 0.59.  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 He is currently BMT Transplant Date: BMT Txp 11/2/2018 (11 days).      Afebrile. C.diff positive,  Ativan prn effective for abdominal cramping, increased PCA dose reducing mucositis pain to manageable level. Pruritis manageable without narcan gtt.      BMT:  # High risk B cell ALL (CNS negative) + JAK2 activation/BMT: Latest bone marrow biopsy (10/15) MRD 0.006%; Ruxolitinib  continued until admission.  Conditioning per 2015-29: TBI days -4 through -1 followed by matched sibling donor transplant on 11/2   - awaiting count recovery       # Risk for GVHD: Post-transplant Cytoxan on days +3 and +4 complete   - Continue MMF   - Tacrolimus drip, level 11/12 was 9.6, gtt increased, recheck pending today.    FEN/Renal:  # Risk for malnutrition: continue TPN/IL  - Dietician following, minimal intake       # Risk for electrolyte abnormalities:  - Check daily electrolytes. Given cardiac history (WPW), keep K>3.5, Mg>1.8, iCa nml.   - Mag and K sliding scale       # Risk for renal dysfunction and fluid overload:  - monitor I/O's and daily weights       # Risk for aHUS/TA-TMA:    - monitor LDH qMonday  - monitor urine protein/creatinine qTuesday      Pulmonary:  # Risk for pulmonary toxicity secondary to chemotherapy:   - monitor respiratory function per unit routine      # Rhinorrhea: 3 mos history, mild. Sinus CT (10/22) with findings of mucosal thickening without evidence of active infection.  RVP negative.      # Pneumomediastinum: finding upon work up Chest CT (10/22), asymptomatic. Per radiology, likely benign, perhaps transient.   - Patient instructed to notify care team with any SOB, dyspnea, or remarkable cough      Cardiovascular:  # Aaron-Parkinson-White Syndrome: diagnosed upon syncope in 02/2018. Asymptomatic, no interventions to date. Work up EKG c/w WPW, sinus bradycardia at 49 bpm. Work up ECHO normal with EF 68%.   Cardiology visit with Dr. Plummer (10/22): Holter revealed 10 PACs with no evidence of AV block. Normal Echocardiogram with EF 68%. Isael has hx of HR in high 30s with sleep.  - Avoid catecholinergics as able to avoid tachycardia  - Maintain normal iCa+, mag > 1.8, K > 3.5      # risk for hypertension secondary to medications:   - PRN hydralazine available      Heme:   # Pancytopenia secondary to chemotherapy: hx 1 platelet transfusion, well tolerated  - Transfuse for  hemoglobin < 8, platelets < 30,000 (Defibrotide), no premedications  - GCSF until ANC >1500 x3      Infectious Disease:  # Risk for infection given immunocompromised status:   Active: Afebrile, fever during conditioning, continue Cefepime through engraftment  - CVL insertion site with purulent drainage but no erythema -- sent wound cx 11/5 NGTD     # C.difficile infection: stool + 11/12. Started oral vancomycin 4 x daily.      ID history: 3 mos hx of mild rhinorrhea. Chest/sinus CT without evidence of infection. 10/22 RVP negative.      Prophylaxis:                                                                                                                        - viral prophylaxis: CMV neg (donor CMV positive), HSV positive. Acyclovir  - fungal prophylaxis: high risk - Micafungin    - bacterial prophylaxis: Cefepime through engraftment       Past infections: none significant       GI:   # Nausea management: Nausea without vomiting  - Scheduled medications: Kytril Q12H   - PRN medications: Ativan, Benadryl, Marinol, Phenergan    - avoiding scop patch for now due to xerostomia      # Risk for VOD: especially high risk  previous Inotuzumab therapy.  Defibrotide ppx  - Ursodiol TID (10 mg/kg)      # Diarrhea:  See C.diff above    ENT:  #epistaxis: very mild, nasal mucosa dry  - Ayr gel PRN  - Vaseline to nares      Neuro:  # Mucositis/pain:   - Continue morphine continuous infusion with PCA per patient, PCA dose increased 11/12, adequate relief per Isael.  - Integrative therapy     # Generalize pruritis: Prefers not to access port to start narcan gtt at this time.    Derm:  # Skin breakdown, anus: continue applying barrier cream. Small amount of  blood when wiping after BM today.      The above plan of care was developed by and communicated to me by the   Pediatric BMT attending physician, Dr. Candido Workman, BENJA  [facility]  Pediatric Blood and Marrow  Transplant  [phone]  Pager  [phone]  BMT [facility]  [phone]  BMT hospital workroom        BMT Attending Note:      Artemio was seen and evaluated by me today.       The significant interval history includes continued his mucositis, which I believe is starting to improve a bit.  He continues to have some discomfort, but has not needed escalation in the narcotics. His crampy abdominal pain led to testing for C. Difficile, which was positive. He is now on oral vancomycin. He has otherwise been stable in terms of this vital signs. There are no respiratory or other difficulties.   I have reviewed changes and data from the last 24 hours, including medications, laboratory results, vital signs, radiograph results, consultant recommendations and pathology results.       I have formulated and discussed the plan with the BMT team. I discussed the course and plan with Artemio and his mother, and answered all of their questions to the best of my ability. I counseled them regarding difficulties with pain, the new rash on the left knee (improving), and risks of opportunistic infection. No recent fevers. History of WPW.      My care coordination activities today include oversight of planned lab studies, oversight of planned radiographic studies, oversight of medication changes, discussion with BMT team-members and discussion with consultants.      My total floor time today was at least 40 minutes, greater than 50% of which was counseling and coordination of care.      Candido Joshua MD   Professor, Dept of Pediatrics  Division of Blood and Marrow Transplantation

## 2018-11-14 LAB
ABO + RH BLD: NORMAL
ABO + RH BLD: NORMAL
ANION GAP SERPL CALCULATED.3IONS-SCNC: 4 MMOL/L (ref 3–14)
BACTERIA SPEC CULT: NORMAL
BLD GP AB SCN SERPL QL: NORMAL
BLD PROD TYP BPU: NORMAL
BLD UNIT ID BPU: 0
BLD UNIT ID BPU: 0
BLOOD BANK CMNT PATIENT-IMP: NORMAL
BLOOD PRODUCT CODE: NORMAL
BLOOD PRODUCT CODE: NORMAL
BPU ID: NORMAL
BPU ID: NORMAL
BUN SERPL-MCNC: 15 MG/DL (ref 7–30)
CA-I BLD-MCNC: 4.9 MG/DL (ref 4.4–5.2)
CALCIUM SERPL-MCNC: 8.2 MG/DL (ref 8.5–10.1)
CELL TRANSPLANT TYPE: NORMAL
CHLORIDE SERPL-SCNC: 108 MMOL/L (ref 94–109)
CO2 SERPL-SCNC: 27 MMOL/L (ref 20–32)
CREAT SERPL-MCNC: 0.51 MG/DL (ref 0.66–1.25)
DIFFERENTIAL METHOD BLD: ABNORMAL
ERYTHROCYTE [DISTWIDTH] IN BLOOD BY AUTOMATED COUNT: 11.9 % (ref 10–15)
GFR SERPL CREATININE-BSD FRML MDRD: >90 ML/MIN/1.7M2
GLUCOSE SERPL-MCNC: 118 MG/DL (ref 70–99)
HCT VFR BLD AUTO: 21.4 % (ref 40–53)
HGB BLD-MCNC: 7.7 G/DL (ref 13.3–17.7)
HSV1 DNA SPEC QL NAA+PROBE: NEGATIVE
HSV2 DNA SPEC QL NAA+PROBE: NEGATIVE
Lab: NORMAL
Lab: NORMAL
MAGNESIUM SERPL-MCNC: 1.4 MG/DL (ref 1.6–2.3)
MAGNESIUM SERPL-MCNC: 1.8 MG/DL (ref 1.6–2.3)
MCH RBC QN AUTO: 29.4 PG (ref 26.5–33)
MCHC RBC AUTO-ENTMCNC: 36 G/DL (ref 31.5–36.5)
MCV RBC AUTO: 82 FL (ref 78–100)
NUM BPU REQUESTED: 1
NUM BPU REQUESTED: 2
PHOSPHATE SERPL-MCNC: 4.2 MG/DL (ref 2.5–4.5)
PLATELET # BLD AUTO: 25 10E9/L (ref 150–450)
POTASSIUM SERPL-SCNC: 4.1 MMOL/L (ref 3.4–5.3)
RBC # BLD AUTO: 2.62 10E12/L (ref 4.4–5.9)
SODIUM SERPL-SCNC: 139 MMOL/L (ref 133–144)
SPECIMEN EXP DATE BLD: NORMAL
SPECIMEN SOURCE: NORMAL
TACROLIMUS BLD-MCNC: 16.1 UG/L (ref 5–15)
TME LAST DOSE: ABNORMAL H
TRANSFUSION STATUS PATIENT QL: NORMAL
WBC # BLD AUTO: 0 10E9/L (ref 4–11)
YEAST SPEC QL CULT: NO GROWTH
YEAST SPEC QL CULT: NO GROWTH

## 2018-11-14 PROCEDURE — 25000125 ZZHC RX 250: Performed by: NURSE PRACTITIONER

## 2018-11-14 PROCEDURE — 25000128 H RX IP 250 OP 636: Performed by: PEDIATRICS

## 2018-11-14 PROCEDURE — 25000125 ZZHC RX 250: Performed by: PEDIATRICS

## 2018-11-14 PROCEDURE — 25000132 ZZH RX MED GY IP 250 OP 250 PS 637: Performed by: PEDIATRICS

## 2018-11-14 PROCEDURE — 83735 ASSAY OF MAGNESIUM: CPT | Performed by: NURSE PRACTITIONER

## 2018-11-14 PROCEDURE — 80197 ASSAY OF TACROLIMUS: CPT | Performed by: PEDIATRICS

## 2018-11-14 PROCEDURE — 84100 ASSAY OF PHOSPHORUS: CPT | Performed by: NURSE PRACTITIONER

## 2018-11-14 PROCEDURE — 85027 COMPLETE CBC AUTOMATED: CPT

## 2018-11-14 PROCEDURE — C9399 UNCLASSIFIED DRUGS OR BIOLOG: HCPCS | Performed by: PEDIATRICS

## 2018-11-14 PROCEDURE — 25000128 H RX IP 250 OP 636: Performed by: NURSE PRACTITIONER

## 2018-11-14 PROCEDURE — P9040 RBC LEUKOREDUCED IRRADIATED: HCPCS | Performed by: NURSE PRACTITIONER

## 2018-11-14 PROCEDURE — 25000132 ZZH RX MED GY IP 250 OP 250 PS 637: Performed by: NURSE PRACTITIONER

## 2018-11-14 PROCEDURE — P9037 PLATE PHERES LEUKOREDU IRRAD: HCPCS | Performed by: PEDIATRICS

## 2018-11-14 PROCEDURE — 20000002 ZZH R&B BMT INTERMEDIATE

## 2018-11-14 PROCEDURE — 82330 ASSAY OF CALCIUM: CPT | Performed by: PEDIATRICS

## 2018-11-14 PROCEDURE — 80048 BASIC METABOLIC PNL TOTAL CA: CPT | Performed by: NURSE PRACTITIONER

## 2018-11-14 PROCEDURE — C9113 INJ PANTOPRAZOLE SODIUM, VIA: HCPCS | Performed by: PEDIATRICS

## 2018-11-14 RX ORDER — DIPHENHYDRAMINE HYDROCHLORIDE 50 MG/ML
25 INJECTION INTRAMUSCULAR; INTRAVENOUS EVERY 8 HOURS
Status: DISCONTINUED | OUTPATIENT
Start: 2018-11-14 | End: 2018-11-20

## 2018-11-14 RX ORDER — SILVER SULFADIAZINE 10 MG/G
CREAM TOPICAL 2 TIMES DAILY
Status: DISCONTINUED | OUTPATIENT
Start: 2018-11-14 | End: 2018-11-17

## 2018-11-14 RX ORDER — LORAZEPAM 2 MG/ML
0.8 INJECTION INTRAMUSCULAR EVERY 4 HOURS PRN
Status: DISCONTINUED | OUTPATIENT
Start: 2018-11-14 | End: 2018-11-17

## 2018-11-14 RX ADMIN — DIPHENHYDRAMINE HYDROCHLORIDE 25 MG: 50 INJECTION, SOLUTION INTRAMUSCULAR; INTRAVENOUS at 19:35

## 2018-11-14 RX ADMIN — MYCOPHENOLATE MOFETIL 960 MG: 500 INJECTION, POWDER, LYOPHILIZED, FOR SOLUTION INTRAVENOUS at 14:02

## 2018-11-14 RX ADMIN — ACYCLOVIR SODIUM 650 MG: 50 INJECTION, SOLUTION INTRAVENOUS at 23:06

## 2018-11-14 RX ADMIN — DEFIBROTIDE SODIUM 400 MG: 80 INJECTION, SOLUTION INTRAVENOUS at 17:33

## 2018-11-14 RX ADMIN — DEFIBROTIDE SODIUM 400 MG: 80 INJECTION, SOLUTION INTRAVENOUS at 06:59

## 2018-11-14 RX ADMIN — DEFIBROTIDE SODIUM 400 MG: 80 INJECTION, SOLUTION INTRAVENOUS at 23:04

## 2018-11-14 RX ADMIN — GRANISETRON HYDROCHLORIDE 1 MG: 1 INJECTION INTRAVENOUS at 19:37

## 2018-11-14 RX ADMIN — MORPHINE SULFATE: 5 INJECTION, SOLUTION INTRAVENOUS at 12:50

## 2018-11-14 RX ADMIN — ACYCLOVIR SODIUM 650 MG: 50 INJECTION, SOLUTION INTRAVENOUS at 16:20

## 2018-11-14 RX ADMIN — TACROLIMUS 0.04 MG/KG/DAY: 5 INJECTION, SOLUTION INTRAVENOUS at 19:37

## 2018-11-14 RX ADMIN — LORAZEPAM 1 MG: 2 INJECTION INTRAMUSCULAR; INTRAVENOUS at 02:42

## 2018-11-14 RX ADMIN — VANCOMYCIN HYDROCHLORIDE 125 MG: KIT at 19:38

## 2018-11-14 RX ADMIN — I.V. FAT EMULSION 240 ML: 20 EMULSION INTRAVENOUS at 19:33

## 2018-11-14 RX ADMIN — Medication 1 ML: at 20:15

## 2018-11-14 RX ADMIN — DIPHENHYDRAMINE HYDROCHLORIDE 25 MG: 50 INJECTION, SOLUTION INTRAMUSCULAR; INTRAVENOUS at 02:24

## 2018-11-14 RX ADMIN — GRANISETRON HYDROCHLORIDE 1 MG: 1 INJECTION INTRAVENOUS at 08:55

## 2018-11-14 RX ADMIN — DIPHENHYDRAMINE HYDROCHLORIDE 25 MG: 50 INJECTION, SOLUTION INTRAMUSCULAR; INTRAVENOUS at 12:43

## 2018-11-14 RX ADMIN — VANCOMYCIN HYDROCHLORIDE 125 MG: KIT at 12:43

## 2018-11-14 RX ADMIN — Medication 2 G: at 04:08

## 2018-11-14 RX ADMIN — VANCOMYCIN HYDROCHLORIDE 125 MG: KIT at 07:52

## 2018-11-14 RX ADMIN — MYCOPHENOLATE MOFETIL 960 MG: 500 INJECTION, POWDER, LYOPHILIZED, FOR SOLUTION INTRAVENOUS at 21:00

## 2018-11-14 RX ADMIN — Medication 2 G: at 13:27

## 2018-11-14 RX ADMIN — MYCOPHENOLATE MOFETIL 960 MG: 500 INJECTION, POWDER, LYOPHILIZED, FOR SOLUTION INTRAVENOUS at 05:00

## 2018-11-14 RX ADMIN — Medication 2 G: at 02:27

## 2018-11-14 RX ADMIN — MICAFUNGIN SODIUM 150 MG: 10 INJECTION, POWDER, LYOPHILIZED, FOR SOLUTION INTRAVENOUS at 09:22

## 2018-11-14 RX ADMIN — PANTOPRAZOLE SODIUM 40 MG: 40 INJECTION, POWDER, FOR SOLUTION INTRAVENOUS at 07:53

## 2018-11-14 RX ADMIN — Medication 350 MCG: at 19:37

## 2018-11-14 RX ADMIN — Medication 2 G: at 20:15

## 2018-11-14 RX ADMIN — Medication 1 ML: at 19:37

## 2018-11-14 RX ADMIN — DEFIBROTIDE SODIUM 400 MG: 80 INJECTION, SOLUTION INTRAVENOUS at 11:24

## 2018-11-14 RX ADMIN — LORAZEPAM 1 MG: 2 INJECTION INTRAMUSCULAR; INTRAVENOUS at 19:54

## 2018-11-14 RX ADMIN — PHYTONADIONE: 1 INJECTION, EMULSION INTRAMUSCULAR; INTRAVENOUS; SUBCUTANEOUS at 19:34

## 2018-11-14 RX ADMIN — Medication 300 MG: at 14:02

## 2018-11-14 RX ADMIN — ACYCLOVIR SODIUM 650 MG: 50 INJECTION, SOLUTION INTRAVENOUS at 06:59

## 2018-11-14 NOTE — PLAN OF CARE
Problem: Patient Care Overview  Goal: Plan of Care/Patient Progress Review  Outcome: No Change  Pt afebrile, lungs clear/diminished, VSS. Severe pain/itching in dominga area, benadryl scheduled. Discussed POC with [facility] nurse who recommended incontinence protocol for 48 hours. Information and supplies brought in and explained to family. Pt did not voice opinion about regimen but mom reported patient would not use barrier cream. Medhoney was brought in to try in hopes of relieving some discomfort. Continue to assess. Withdrawn all day with minimal interaction. Good urine output.  No other issues of note. Hourly rounding completed.

## 2018-11-14 NOTE — PROGRESS NOTES
WOC Consult    S: Received provider consult for perianal wounds in the setting of loose stools and C-diff infection.    B: High risk B cell ALL (CNS negative) + JAK2 activation/BMT: Latest bone marrow biopsy (10/15) MRD 0.006%; Ruxolitinib continued until admission.  Conditioning per 2015-29: TBI days -4 through -1 followed by matched sibling donor transplant on 11/2.    A: Spoke with bedside RN. Patient currently using Rosy Cleanse and Protect and a soft cloth followed by Aquaphor. Encouraged RN to implement use of Incontinence Protocol with Criticiad Paste for protection (Aquaphor is not a barrier to stool). Patient not seen by WOC today.    P: WOC will follow up on Friday to assess if more interventions are needed.     Dary Orta RN, CWOCN

## 2018-11-14 NOTE — PROGRESS NOTES
Reviewed RN consult for perineal wounds from loose stools/C-diff, provider order required for WOC service. WOC RN requested bedside RN obtain provider order/consult.   WOC discussed with bedside RN use of Incontinence Protocol, where to find this Protocol, and to use consistently for 48 hours then reassess. RN agrees to discuss in rounds with MD.  WOC consult completed by WOC- RN, await provider consult if services desired

## 2018-11-14 NOTE — PLAN OF CARE
Problem: Stem Cell/Bone Marrow Transplant (Adult)  Goal: Signs and Symptoms of Listed Potential Problems Will be Absent, Minimized or Managed (Stem Cell/Bone Marrow Transplant)  Signs and symptoms of listed potential problems will be absent, minimized or managed by discharge/transition of care (reference Stem Cell/Bone Marrow Transplant (Adult) CPG).   Outcome: No Change  Afebrile. VSS on RA. LS clear. Patient's perineal raw and denuded, complaining of 10/10 pain and itchiness. MD at bedside to assess. Patient refused change in PCA settings, but did report PRN Benadryl was very successful with the itching. Attempted to soak in a bath but patient reported that this actually made things worse. Cultures collected per MD order. pRN Ativan x1 for nausea. Good UOP, unable to assess stool output as patient unwilling to stool in hat. Family at bedside this evening, interacting with patient. Hourly rounding complete. Continue with plan of care.

## 2018-11-14 NOTE — PROGRESS NOTES
[facility]   PEDIATRIC BMT SOCIAL WORK PROGRESS NOTE  DATA:     Artemio is a 18 yr old male undergoing BMT for ALL. Artemio is his own medical decision maker. His parents  but both very involved. Mervat - Mom, Dangelo - Dad, Step-mom Camryn are all involved. Artemio's three parents will be sharing care giving duties post hospitalization.     met with Isael on Thursday nov. 8th after a member of his family notified a nurse who notified  that Isael's mom was showing up to visit unannounced when Isael's Dad was also visiting and the tension made Isael very uncomfortable.   asked Isael in private if this was the case and he stated unequivocally that this was not the case. He stated having both parents visiting him at the same time did not make him uncomfortable at all, although he acknowledged that he could understand how they might be uncomfortable with the situation given their history.   stated that if Isael was uncomfortable and it was a stressor that was impeding his medical progress then the  could help facilitate a conversation between his Mom, Dad and Step mom to come up with a creative solution or possibly a visiting schedule. Isael again stated he was not at all uncomfortable and did not feel that a visiting schedule was necessary and told the  that no further intervention was necessary. He stated his parents were adults and he felt confident that they could work through any issues independently if necessary.   will pursue no further action steps in this area unless told otherwise by Isael.    Isael also explained that he had recently spoke with his mom about his post hospitalization plans. He stated he told her he wants to spend the first 2-3 weeks staying at his dad and step-mom's house and then once he's settled into a routine and theoretically starting to feel better,  he'll start spending one week at mom's apartment and one week at his dad/step mom's house and alternate back and forth. The intention is still that all three will share care giving duties, presumably no matter where he's staying at any given time.   explained that whatever he decided to do was fine with the treatment team, our only requirements are that he be staying in a place 30 min or less from the transplant center and that he have a caregiver with him at all times. The specific residence he stays at is completely up to him and his parents to work out and we have no preference as a team, nor would we encourage one option over the other.        INTERVENTION:      Supportive counseling re: Complex Family Dynamics    ASSESSMENT:      Patient was in good spirits, open to discussing the concerns brought to his attention. He did not appear troubled by these concerns. Appeared comfortable with current plan.     PLAN:    will provide ongoing psychosocial support to patient and family as needed.      REJI Barnes, [facility]    Pediatric Blood and Marrow Transplant  [phone]  [email or web address]      11/14/2018   10:44 AM

## 2018-11-14 NOTE — PLAN OF CARE
Problem: Stem Cell/Bone Marrow Transplant (Adult)  Goal: Signs and Symptoms of Listed Potential Problems Will be Absent, Minimized or Managed (Stem Cell/Bone Marrow Transplant)  Signs and symptoms of listed potential problems will be absent, minimized or managed by discharge/transition of care (reference Stem Cell/Bone Marrow Transplant (Adult) CPG).   Afebrile. VSS on RA. LS clear. Patient reported pain in perineal area after using toilet, sitting in bed crying. After taking a bump from PCA and receiving PRN Benadryl he calmed down and was able to go back to sleep. Took 12 bumps from PCA, was denied 3. PRN Ativan x1 for nausea Good UOP, BM x1.. Received platelets and PRBCs without issue. Mg sulf replaced, recheck pending. Family present during evening, left around 10 pm. Hourly rounding complete. Continue with plan of care.

## 2018-11-14 NOTE — PROGRESS NOTES
Pediatric BMT Daily Progress Note    Interval Events: Afebrile. Difficult night due to significant pain and pruritis with anal skin breakdown. Best relief with IV benadryl.     Review of Systems: Pertinent positives include those mentioned in interval events. A complete review of systems was performed and is otherwise negative.      Medications:  Please see MAR    Physical Exam:  Temp:  [98.4  F (36.9  C)-99.4  F (37.4  C)] 98.6  F (37  C)  Pulse:  [] 76  Resp:  [18-28] 20  BP: (101-122)/(50-73) 102/60  SpO2:  [96 %-100 %] 98 %  I/O last 3 completed shifts:  In: 3418.93 [I.V.:1185.93]  Out: 2275 [Urine:2275]  GEN: Eyes closed, answers questions. No acute distress. Parent present.  HEENT: Normocephalic, atraumatic, alopecia, sclera anicteric, non-injected, mouth with some erythema and breakdown on buccal mucosa and lateral aspects of tongue, nares patent without discharge    CARD: Heart regular rate and rhythm. No murmurs, rubs or gallops.  Cap refill 2 seconds.  RESP: Lungs clear to auscultation. No increased work of breathing, crackles or wheezes.   ABD: Non-distended, non-tender, bowel sounds hyperactive, mild tenderness  EXTREM: No edema noted  SKIN: No rashes noted    Labs:  Labs reviewed, pertinent findings BMP with BUN 15, Cr 0.51.  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 He is currently BMT Transplant Date: BMT Txp 11/2/2018 (12 days).      Afebrile. Significant dominga anal discomfort, skin reddened with some areas of breakdown. Benadryl helpful. WOC consulted today.      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  Conditioning per 2015-29: TBI days -4 through -1 followed by matched sibling donor transplant on 11/2   - awaiting count recovery       # Risk for  GVHD: Post-transplant Cytoxan on days +3 and +4 complete   - Continue MMF   - Tacrolimus drip, level 11/13 was 15.6, gtt decreased, recheck pending today.    FEN/Renal:  # Risk for malnutrition: continue TPN/IL  - Dietician following, minimal intake       # Risk for electrolyte abnormalities:  - Check daily electrolytes. Given cardiac history (WPW), keep K>3.5, Mg>1.8, iCa nml.   - Mag and K sliding scale       # Risk for renal dysfunction and fluid overload:  - monitor I/O's and daily weights       # Risk for aHUS/TA-TMA:    - monitor LDH qMonday  - monitor urine protein/creatinine qTuesday      Pulmonary:  # Risk for pulmonary toxicity secondary to chemotherapy:   - monitor respiratory function per unit routine      # Rhinorrhea: 3 mos history, mild. Sinus CT (10/22) with findings of mucosal thickening without evidence of active infection.  RVP negative.      # Pneumomediastinum: finding upon work up Chest CT (10/22), asymptomatic. Per radiology, likely benign, perhaps transient.   - Patient instructed to notify care team with any SOB, dyspnea, or remarkable cough      Cardiovascular:  # Aaron-Parkinson-White Syndrome: diagnosed upon syncope in 02/2018. Asymptomatic, no interventions to date. Work up EKG c/w WPW, sinus bradycardia at 49 bpm. Work up ECHO normal with EF 68%.   Cardiology visit with Dr. Plummer (10/22): Holter revealed 10 PACs with no evidence of AV block. Normal Echocardiogram with EF 68%. Isael has hx of HR in high 30s with sleep.  - Avoid catecholinergics as able to avoid tachycardia  - Maintain normal iCa+, mag > 1.8, K > 3.5      # risk for hypertension secondary to medications:   - PRN hydralazine available      Heme:   # Pancytopenia secondary to chemotherapy: hx 1 platelet transfusion, well tolerated  - Transfuse for hemoglobin < 8, platelets < 30,000 (Defibrotide), no premedications  - GCSF until ANC >1500 x3      Infectious Disease:  # Risk for infection given immunocompromised status:    Active: Afebrile, fever during conditioning, continue Cefepime through engraftment  - CVL insertion site with purulent drainage but no erythema -- sent wound cx 11/5 NGTD     # C.difficile infection: stool + 11/12. Started oral vancomycin 4 x daily. Reports fewer episodes of diarrhea and no longer cramping.      ID history: 3 mos hx of mild rhinorrhea. Chest/sinus CT without evidence of infection. 10/22 RVP negative.      Prophylaxis:                                                                                                                        - viral prophylaxis: CMV neg (donor CMV positive), HSV positive. Acyclovir  - fungal prophylaxis: high risk - Micafungin    - bacterial prophylaxis: Cefepime through engraftment       Past infections: none significant       GI:   # Nausea management: Nausea without vomiting  - Scheduled medications: Kytril Q12H   - PRN medications: Ativan, Benadryl, Marinol, Phenergan    - avoiding scop patch for now due to xerostomia      # Risk for VOD: especially high risk  previous Inotuzumab therapy.  Defibrotide ppx  - Ursodiol TID (10 mg/kg)      # Diarrhea:  See C.diff above    ENT:  #epistaxis: occasional drips, nasal mucosa dry  - Ayr gel PRN  - Vaseline to nares      Neuro:  # Mucositis/pain:   - Continue morphine continuous infusion with PCA per patient, PCA dose decreased 11/14 as not really helpful for dominga anal pruritis/pain and not having pain elsewhere.  - Integrative therapy     # Pruritis: Dominga anal area, scheduled IV benadryl 25 mg q8h as Isael felt this was most helpful.    Derm:  # Skin breakdown, anus: Consulted WOC RN today, directed team to follow skin breakdown protocol. RN printed directions, educated Isael and parents, set up products in bathroom to make adherence easier.   RN also ordered Medi honey to try.      The above plan of care was developed by and communicated to me by the   Pediatric BMT attending physician, Dr. Candido Workman,  CPNP  [facility]  Pediatric Blood and Marrow Transplant  [phone]  Pager  [phone]  BMT [facility]  [phone]  BMT hospital workroom        BMT Attending Note:      Artemio was seen and evaluated by me today.       The significant interval history includes continued his mucositis, which on exam  Is improving slightly.  However, related to the C. Difficile issue he has had many loose stools, with significant skin breakdown in the perianal area.  This is more painful to him than the other issues.   He continues on oral vancomycin. He has otherwise been stable in terms of this vital signs. There are no respiratory or other difficulties.   I have reviewed changes and data from the last 24 hours, including medications, laboratory results, vital signs, radiograph results, consultant recommendations and pathology results.       I have formulated and discussed the plan with the BMT team. I discussed the course and plan with Artemio and his mother, and answered all of their questions to the best of my ability. I counseled them regarding difficulties with pain, the new rash on the left knee (improving), and risks of opportunistic infection. No recent fevers. History of WPW.      My care coordination activities today include oversight of planned lab studies, oversight of planned radiographic studies, oversight of medication changes, discussion with BMT team-members and discussion with consultants.      My total floor time today was at least 40 minutes, greater than 50% of which was counseling and coordination of care.      Candido Joshua MD   Professor, Dept of Pediatrics  Division of Blood and Marrow Transplantation

## 2018-11-15 LAB
ALBUMIN SERPL-MCNC: 2.6 G/DL (ref 3.4–5)
ALP SERPL-CCNC: 74 U/L (ref 40–150)
ALT SERPL W P-5'-P-CCNC: 19 U/L (ref 0–70)
ANION GAP SERPL CALCULATED.3IONS-SCNC: 7 MMOL/L (ref 3–14)
AST SERPL W P-5'-P-CCNC: 19 U/L (ref 0–45)
BILIRUB DIRECT SERPL-MCNC: 0.5 MG/DL (ref 0–0.2)
BILIRUB SERPL-MCNC: 1.1 MG/DL (ref 0.2–1.3)
BLD PROD TYP BPU: NORMAL
BLD PROD TYP BPU: NORMAL
BLD UNIT ID BPU: 0
BLOOD PRODUCT CODE: NORMAL
BPU ID: NORMAL
BUN SERPL-MCNC: 15 MG/DL (ref 7–30)
CALCIUM SERPL-MCNC: 8.3 MG/DL (ref 8.5–10.1)
CHLORIDE SERPL-SCNC: 107 MMOL/L (ref 94–109)
CO2 SERPL-SCNC: 24 MMOL/L (ref 20–32)
CREAT SERPL-MCNC: 0.59 MG/DL (ref 0.66–1.25)
DIFFERENTIAL METHOD BLD: ABNORMAL
ERYTHROCYTE [DISTWIDTH] IN BLOOD BY AUTOMATED COUNT: 12.1 % (ref 10–15)
FIBRINOGEN PPP-MCNC: 679 MG/DL (ref 200–420)
GFR SERPL CREATININE-BSD FRML MDRD: >90 ML/MIN/1.7M2
GLUCOSE SERPL-MCNC: 110 MG/DL (ref 70–99)
HCT VFR BLD AUTO: 23.5 % (ref 40–53)
HGB BLD-MCNC: 8.8 G/DL (ref 13.3–17.7)
INR PPP: 1.06 (ref 0.86–1.14)
MAGNESIUM SERPL-MCNC: 1.6 MG/DL (ref 1.6–2.3)
MCH RBC QN AUTO: 29.7 PG (ref 26.5–33)
MCHC RBC AUTO-ENTMCNC: 37.4 G/DL (ref 31.5–36.5)
MCV RBC AUTO: 79 FL (ref 78–100)
NUM BPU REQUESTED: 1
PLATELET # BLD AUTO: 21 10E9/L (ref 150–450)
POTASSIUM SERPL-SCNC: 4.5 MMOL/L (ref 3.4–5.3)
PROT SERPL-MCNC: 6.3 G/DL (ref 6.8–8.8)
RBC # BLD AUTO: 2.96 10E12/L (ref 4.4–5.9)
SODIUM SERPL-SCNC: 138 MMOL/L (ref 133–144)
TACROLIMUS BLD-MCNC: 17.9 UG/L (ref 5–15)
TME LAST DOSE: ABNORMAL H
TRANSFUSION STATUS PATIENT QL: NORMAL
TRANSFUSION STATUS PATIENT QL: NORMAL
WBC # BLD AUTO: 0 10E9/L (ref 4–11)

## 2018-11-15 PROCEDURE — 25000128 H RX IP 250 OP 636: Performed by: PEDIATRICS

## 2018-11-15 PROCEDURE — C9113 INJ PANTOPRAZOLE SODIUM, VIA: HCPCS | Performed by: PEDIATRICS

## 2018-11-15 PROCEDURE — 85610 PROTHROMBIN TIME: CPT | Performed by: PEDIATRICS

## 2018-11-15 PROCEDURE — 82248 BILIRUBIN DIRECT: CPT | Performed by: PEDIATRICS

## 2018-11-15 PROCEDURE — 25000128 H RX IP 250 OP 636: Performed by: NURSE PRACTITIONER

## 2018-11-15 PROCEDURE — 83735 ASSAY OF MAGNESIUM: CPT | Performed by: PEDIATRICS

## 2018-11-15 PROCEDURE — 80197 ASSAY OF TACROLIMUS: CPT | Performed by: PEDIATRICS

## 2018-11-15 PROCEDURE — 85384 FIBRINOGEN ACTIVITY: CPT | Performed by: PEDIATRICS

## 2018-11-15 PROCEDURE — 25000125 ZZHC RX 250: Performed by: PEDIATRICS

## 2018-11-15 PROCEDURE — C9399 UNCLASSIFIED DRUGS OR BIOLOG: HCPCS | Performed by: PEDIATRICS

## 2018-11-15 PROCEDURE — 85027 COMPLETE CBC AUTOMATED: CPT

## 2018-11-15 PROCEDURE — 25000125 ZZHC RX 250: Performed by: NURSE PRACTITIONER

## 2018-11-15 PROCEDURE — 80053 COMPREHEN METABOLIC PANEL: CPT | Performed by: PEDIATRICS

## 2018-11-15 PROCEDURE — 85730 THROMBOPLASTIN TIME PARTIAL: CPT | Performed by: PEDIATRICS

## 2018-11-15 PROCEDURE — 25000132 ZZH RX MED GY IP 250 OP 250 PS 637: Performed by: PEDIATRICS

## 2018-11-15 PROCEDURE — P9037 PLATE PHERES LEUKOREDU IRRAD: HCPCS | Performed by: PEDIATRICS

## 2018-11-15 PROCEDURE — 20000002 ZZH R&B BMT INTERMEDIATE

## 2018-11-15 PROCEDURE — 25000132 ZZH RX MED GY IP 250 OP 250 PS 637: Performed by: NURSE PRACTITIONER

## 2018-11-15 RX ORDER — URSODIOL 250 MG/1
250 TABLET, FILM COATED ORAL 3 TIMES DAILY
Status: DISCONTINUED | OUTPATIENT
Start: 2018-11-15 | End: 2018-11-20

## 2018-11-15 RX ADMIN — TACROLIMUS 0.02 MG/KG/DAY: 5 INJECTION, SOLUTION INTRAVENOUS at 20:14

## 2018-11-15 RX ADMIN — VANCOMYCIN HYDROCHLORIDE 125 MG: KIT at 16:50

## 2018-11-15 RX ADMIN — DEFIBROTIDE SODIUM 400 MG: 80 INJECTION, SOLUTION INTRAVENOUS at 11:38

## 2018-11-15 RX ADMIN — Medication 350 MCG: at 20:16

## 2018-11-15 RX ADMIN — MYCOPHENOLATE MOFETIL 960 MG: 500 INJECTION, POWDER, LYOPHILIZED, FOR SOLUTION INTRAVENOUS at 14:16

## 2018-11-15 RX ADMIN — URSODIOL 250 MG: 250 TABLET ORAL at 20:21

## 2018-11-15 RX ADMIN — URSODIOL 250 MG: 250 TABLET ORAL at 16:46

## 2018-11-15 RX ADMIN — Medication 2 G: at 13:41

## 2018-11-15 RX ADMIN — VANCOMYCIN HYDROCHLORIDE 125 MG: KIT at 20:21

## 2018-11-15 RX ADMIN — MICAFUNGIN SODIUM 150 MG: 10 INJECTION, POWDER, LYOPHILIZED, FOR SOLUTION INTRAVENOUS at 09:12

## 2018-11-15 RX ADMIN — DIPHENHYDRAMINE HYDROCHLORIDE 25 MG: 50 INJECTION, SOLUTION INTRAMUSCULAR; INTRAVENOUS at 19:43

## 2018-11-15 RX ADMIN — LORAZEPAM 0.8 MG: 2 INJECTION INTRAMUSCULAR; INTRAVENOUS at 00:15

## 2018-11-15 RX ADMIN — PANTOPRAZOLE SODIUM 40 MG: 40 INJECTION, POWDER, FOR SOLUTION INTRAVENOUS at 09:10

## 2018-11-15 RX ADMIN — DIPHENHYDRAMINE HYDROCHLORIDE 25 MG: 50 INJECTION, SOLUTION INTRAMUSCULAR; INTRAVENOUS at 03:06

## 2018-11-15 RX ADMIN — DEFIBROTIDE SODIUM 400 MG: 80 INJECTION, SOLUTION INTRAVENOUS at 06:50

## 2018-11-15 RX ADMIN — MYCOPHENOLATE MOFETIL 960 MG: 500 INJECTION, POWDER, LYOPHILIZED, FOR SOLUTION INTRAVENOUS at 06:50

## 2018-11-15 RX ADMIN — ACYCLOVIR SODIUM 650 MG: 50 INJECTION, SOLUTION INTRAVENOUS at 07:00

## 2018-11-15 RX ADMIN — Medication 0.5 ML: at 20:57

## 2018-11-15 RX ADMIN — MYCOPHENOLATE MOFETIL 960 MG: 500 INJECTION, POWDER, LYOPHILIZED, FOR SOLUTION INTRAVENOUS at 22:34

## 2018-11-15 RX ADMIN — GRANISETRON HYDROCHLORIDE 1 MG: 1 INJECTION INTRAVENOUS at 09:10

## 2018-11-15 RX ADMIN — Medication 2 G: at 04:26

## 2018-11-15 RX ADMIN — DEFIBROTIDE SODIUM 400 MG: 80 INJECTION, SOLUTION INTRAVENOUS at 18:00

## 2018-11-15 RX ADMIN — GRANISETRON HYDROCHLORIDE 1 MG: 1 INJECTION INTRAVENOUS at 20:18

## 2018-11-15 RX ADMIN — Medication 2 G: at 20:57

## 2018-11-15 RX ADMIN — DIPHENHYDRAMINE HYDROCHLORIDE 25 MG: 50 INJECTION, SOLUTION INTRAMUSCULAR; INTRAVENOUS at 11:40

## 2018-11-15 RX ADMIN — MORPHINE SULFATE: 5 INJECTION, SOLUTION INTRAVENOUS at 16:14

## 2018-11-15 RX ADMIN — Medication 0.5 ML: at 20:16

## 2018-11-15 RX ADMIN — PHYTONADIONE: 1 INJECTION, EMULSION INTRAMUSCULAR; INTRAVENOUS; SUBCUTANEOUS at 20:10

## 2018-11-15 RX ADMIN — LORAZEPAM 0.8 MG: 2 INJECTION INTRAMUSCULAR; INTRAVENOUS at 18:58

## 2018-11-15 RX ADMIN — ACYCLOVIR SODIUM 650 MG: 50 INJECTION, SOLUTION INTRAVENOUS at 16:46

## 2018-11-15 RX ADMIN — VANCOMYCIN HYDROCHLORIDE 125 MG: KIT at 11:42

## 2018-11-15 RX ADMIN — I.V. FAT EMULSION 240 ML: 20 EMULSION INTRAVENOUS at 20:13

## 2018-11-15 NOTE — PLAN OF CARE
Problem: Stem Cell/Bone Marrow Transplant (Adult)  Goal: Signs and Symptoms of Listed Potential Problems Will be Absent, Minimized or Managed (Stem Cell/Bone Marrow Transplant)  Signs and symptoms of listed potential problems will be absent, minimized or managed by discharge/transition of care (reference Stem Cell/Bone Marrow Transplant (Adult) CPG).   Outcome: No Change  Afebrile. VSS on RA. LS clclear. Reported pain 9/10 but at this time was laying and appeared to be comfortable conversing with mom. Took 7 bumps from PCA, denied 3. Platelets replaced without incidence. Good UOP, unable to assess stool frequently as patient not stooling in hat. Minimal complaints of tichiness with scheduled Benadryl. Platelets replaced without incidence. Appeared to sleep well overnight. Mother at bedside. Hourly round complete. Continue with plan of care.

## 2018-11-15 NOTE — PROGRESS NOTES
CLINICAL NUTRITION SERVICES - REASSESSMENT NOTE     ANTHROPOMETRICS  Height/Length: 174 cm  Weight: 63.3 kg  BMI: 21.55 kg/m2, healthy weight for height  Dosing Weight: 65.1 kg  Comments:weight up from last week but down from admit     CURRENT NUTRITION ORDERS  Diet: Regular diet     CURRENT NUTRITION SUPPORT   Parenteral Nutrition:  Type of Parenteral Access: Central  PN frequency: Continuous     PN of 132 mLs, Dextrose 225 g, GIR 2.4 mg/kg/min, 98 g Amino Acids, 1.5 g/kg Amino Acids, 240 mL lipids for 1637 kcals, (25 kcal/kg). PN is meeting 100% of kcal needs and 100% of protein needs.     Intake/Tolerance: no po recorded     Current factors affecting nutrition intake include:decreased appetite, mucositis and nausea     NEW FINDINGS:  Loose stools, dominga-anal wound, + c-diff     LABS  Labs reviewed     MEDICATIONS  Medications reviewed     ASSESSED NUTRITION NEEDS:  Estimated Energy Needs: 25-30 kcal/kg  Estimated Protein Needs: 1.5 g/kg  Estimated Fluid Needs: 1mL/kcal       PEDIATRIC NUTRITION STATUS VALIDATION  Patient does not meet criteria for malnutrition based on adult malnutrition criteria.     EVALUATION OF PREVIOUS PLAN OF CARE:   Monitoring from previous assessment:  Food and Beverage intake- no po recorded  Enteral and parenteral nutrition intake- PN/IL  Anthropometric measurements- weight down slightly from admit but up since last week     Previous Goals:   1. Wt maintenance within 5% of admit weight- goal met  2. Po and/or nutrition support to meet greater than 75% of nutritional needs- goal met with PN     Previous Nutrition Diagnosis:   Predicted suboptimal nutrient intake related to decreased appetite and po and mucositis with reliance on PN to meet nutritional needs with potential for interruptions.  Evaluation: ongoing     NUTRITION DIAGNOSIS:  Predicted suboptimal nutrient intake related to decreased appetite and po and mucositis with reliance on PN to meet nutritional needs with potential for  interruptions.     INTERVENTIONS  Nutrition Prescription  Nutrition support to meet needs until able to transition back to po     Implementation:  Parenteral Nutrition- Continues on current macronutrients in PN. Collaboration and Referral of Nutrition care- pt discussed in rounds.    Goals  1. Wt maintenance within 5% of admit weight  2. Po and/or nutrition support to meet greater than 75% of nutritional needs    FOLLOW UP/MONITORING  Food and Beverage intake- monitor po, Enteral and parenteral nutrition intake- follow for need and Anthropometric measurements- monitor wt     Renetta Schwab, RD, LD, [facility]  [phone]

## 2018-11-15 NOTE — PROGRESS NOTES
Pediatric BMT Daily Progress Note    Interval Events: Afebrile. Main complaint continues to be pain related to perianal skin breakdown. Has been offered several options for topical relief, has declined them. No count recovery yet.    Review of Systems: Pertinent positives include those mentioned in interval events. A complete review of systems was performed and is otherwise negative.      Medications:  Please see MAR    Physical Exam:  Temp:  [98.1  F (36.7  C)-99.5  F (37.5  C)] 99.5  F (37.5  C)  Pulse:  [74-92] 83  Heart Rate:  [84] 84  Resp:  [16-20] 18  BP: ()/(55-68) 103/60  SpO2:  [96 %-100 %] 100 %  I/O last 3 completed shifts:  In: 3386.47 [I.V.:1371.47]  Out: 2650 [Urine:2650]  GEN: Eyes closed, answers questions. No acute distress. Parents present.  HEENT: Normocephalic, atraumatic, alopecia, sclera anicteric, non-injected, mouth with some erythema and breakdown on buccal mucosa and lateral aspects of tongue, nares patent without discharge    CARD: Heart regular rate and rhythm. No murmurs, rubs or gallops.  Cap refill 2 seconds.  RESP: Lungs clear to auscultation. No increased work of breathing, crackles or wheezes.   ABD: Non-distended, non-tender, bowel sounds hyperactive, mild tenderness  EXTREM: No edema noted  SKIN: No rashes noted    Labs:  Labs reviewed, pertinent findings BMP with BUN 15, Cr 0.59.  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 He is currently BMT Transplant Date: BMT Txp 11/2/2018 (13 days).      Afebrile. Significant dominga anal discomfort, skin reddened with some areas of breakdown. Benadryl helpful. Has not been willing to try topical treatment options offered yet.       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  Conditioning per  2015-29: TBI days -4 through -1 followed by matched sibling donor transplant on 11/2   - awaiting count recovery       # Risk for GVHD: Post-transplant Cytoxan on days +3 and +4 complete   - Continue MMF   - Tacrolimus drip, level 11/15 was 16.1, gtt decreased, recheck pending today.    FEN/Renal:  # Risk for malnutrition: continue TPN/IL  - Dietician following, minimal intake       # Risk for electrolyte abnormalities:  - Check daily electrolytes. Given cardiac history (WPW), keep K>3.5, Mg>1.8, iCa nml.   - Mag and K sliding scale       # Risk for renal dysfunction and fluid overload:  - monitor I/O's and daily weights       # Risk for aHUS/TA-TMA:    - monitor LDH qMonday  - monitor urine protein/creatinine qTuesday      Pulmonary:  # Risk for pulmonary toxicity secondary to chemotherapy:   - monitor respiratory function per unit routine      # Rhinorrhea: 3 mos history, mild. Sinus CT (10/22) with findings of mucosal thickening without evidence of active infection.  RVP negative.      # Pneumomediastinum: finding upon work up Chest CT (10/22), asymptomatic. Per radiology, likely benign, perhaps transient.   - Patient instructed to notify care team with any SOB, dyspnea, or remarkable cough      Cardiovascular:  # Aaron-Parkinson-White Syndrome: diagnosed upon syncope in 02/2018. Asymptomatic, no interventions to date. Work up EKG c/w WPW, sinus bradycardia at 49 bpm. Work up ECHO normal with EF 68%.   Cardiology visit with Dr. Plummer (10/22): Holter revealed 10 PACs with no evidence of AV block. Normal Echocardiogram with EF 68%. Isael has hx of HR in high 30s with sleep.  - Avoid catecholinergics as able to avoid tachycardia  - Maintain normal iCa+, mag > 1.8, K > 3.5      # risk for hypertension secondary to medications:   - PRN hydralazine available      Heme:   # Pancytopenia secondary to chemotherapy: hx 1 platelet transfusion, well tolerated  - Transfuse for hemoglobin < 8, platelets < 30,000  (Defibrotide), no premedications  - GCSF until ANC >1500 x3      Infectious Disease:  # Risk for infection given immunocompromised status:   Active: Afebrile, fever during conditioning, continue Cefepime through engraftment  - CVL insertion site with purulent drainage but no erythema -- sent wound cx 11/5 NGTD     # C.difficile infection: stool + 11/12. Continue oral vancomycin 4 x daily. Reports fewer episodes of diarrhea, still has cramping.      ID history: 3 mos hx of mild rhinorrhea. Chest/sinus CT without evidence of infection. 10/22 RVP negative.      Prophylaxis:                                                                                                                        - viral prophylaxis: CMV neg (donor CMV positive), HSV positive. Acyclovir  - fungal prophylaxis: high risk - Micafungin    - bacterial prophylaxis: Cefepime through engraftment       Past infections: none significant       GI:   # Nausea management: Nausea without vomiting  - Scheduled medications: Kytril Q12H   - PRN medications: Ativan (also for anxiety), Benadryl, Marinol, Phenergan    - avoiding scop patch for now due to xerostomia      # Risk for VOD: especially high risk  previous Inotuzumab therapy.  Defibrotide ppx  - Ursodiol TID (10 mg/kg)      # Diarrhea:  See C.diff above    # Hemorrhoids: preparation H prn, has not complained of related discomfort in several days.    ENT:  #epistaxis: occasional drips, nasal mucosa dry  - Ayr gel PRN  - Vaseline to nares      Neuro:  # Mucositis/pain:   - Continue morphine continuous infusion with PCA per patient, PCA dose decreased 11/14 as not really helpful for dominga anal pruritis/pain and not having pain elsewhere.  - Integrative therapy     # Pruritis: Dominga anal area,  Continue scheduled IV benadryl 25 mg q8h as Isael feels this is most helpful.    Derm:  # Skin breakdown, perianal area: erythema with skin breakdown. Consulted WOC RN 11/14, directed team to follow incontinence  protocol. Education provided, treatment declined so far by Isael.  - Has medi honey, silvadene cream and menthol-zinc cream available  - prescribed tucks pads q2h prn      The above plan of care was developed by and communicated to me by the   Pediatric BMT attending physician, Dr. Jojo Haider.      BENJA Chen  [facility]  Pediatric Blood and Marrow Transplant  [phone]  Pager  [phone]  BMT [facility]  [phone]  BMT hospital workroom      Pediatric BMT Attending Inpatient Note:  Artemio was seen and evaluated by me today.     The significant interval history includes most significant source of discomfort skin breakdown in perianal area, though stools less in volume and frequency on antibiotics for C. diff. Artemio struggling to accept assistance or recommendations for cleansing or barrier use to this area. He reports benadryl and ativan are most helpful for symptom management. Remains afebrile.      I have reviewed changes and data from the last 24 hours, including medications, laboratory results, vital signs and consultant recommendations.     I have formulated and discussed the plan with the BMT team.  The relevant clinical topics addressed included the followin19 y/o male with very high risk ALL admitted for MSD BMT, awaiting count recovery requiring intermittent blood product transfusions and on daily GCSF, at risk for GvHD on tacrolimus and MMF, nausea, h/o WPW with tight electrolyte control, h/o fever on empiric cefepime, C. Diff on PO vancomycin, and risk for opportunistic infections on acyclovir and micafungin, perianal skin breakdown stable with wound nurse following, mucositis on TPN/IL and morphine PCA, at risk for VOD on prophylactic defibrotide and ursodiol. Anticipatory guidance provided on engraftment, GI and skin healing.    I discussed the course and plan with the patient/family and answered all of their questions to the best of my  ability.    My care coordination activities today include oversight of planned lab studies, oversight of medication changes, discussion with BMT team-members and discussion with consultants.    My total floor time today was at least 35 minutes, greater than 50% of which was counseling and coordination of care.    Jojo Haider MD MPH  , Pediatric Blood and Marrow Transplantation  [facility] [phone]

## 2018-11-15 NOTE — PLAN OF CARE
Problem: Patient Care Overview  Goal: Plan of Care/Patient Progress Review  Outcome: No Change  3996-2527: Afebrile. VSS. Maintaining O2 sats on room air. Lungs clear. Patient resting off and on throughout the shift. Patient did not describe pain, but mom came out to nursing pod and expressed that patient was in 9-10/10 pain (broken down skin on buttocks) whenever he is awake. RN talked with mom about encouraging Medihoney. Silvadene cream ordered BID. RN brought in printouts of educational material on medihoney and silvadene for patient. RN asked patient if RN could educate on creams now; patient decined at this time. Will continue to encourage education and assess use of creams. Voiding. Patient short/withdrawn with RN, but otherwise appropriate. 1200 vancomycin still at bedside; patient reminded to take this. 1600 vancomycin marked as not given at this time. Mother attentive at bedside. Hourly rounding complete. Continue with plan of care.

## 2018-11-16 LAB
ABO + RH BLD: NORMAL
ABO + RH BLD: NORMAL
ALBUMIN UR-MCNC: 10 MG/DL
ANION GAP SERPL CALCULATED.3IONS-SCNC: 5 MMOL/L (ref 3–14)
APPEARANCE UR: CLEAR
BILIRUB UR QL STRIP: NEGATIVE
BLD GP AB SCN SERPL QL: NORMAL
BLOOD BANK CMNT PATIENT-IMP: NORMAL
BUN SERPL-MCNC: 18 MG/DL (ref 7–30)
CA-I BLD-MCNC: 5.4 MG/DL (ref 4.4–5.2)
CALCIUM SERPL-MCNC: 9.3 MG/DL (ref 8.5–10.1)
CHLORIDE SERPL-SCNC: 104 MMOL/L (ref 94–109)
CO2 SERPL-SCNC: 27 MMOL/L (ref 20–32)
COLOR UR AUTO: YELLOW
CREAT SERPL-MCNC: 0.57 MG/DL (ref 0.66–1.25)
CREAT UR-MCNC: 70 MG/DL
DIFFERENTIAL METHOD BLD: ABNORMAL
ERYTHROCYTE [DISTWIDTH] IN BLOOD BY AUTOMATED COUNT: 12.1 % (ref 10–15)
GFR SERPL CREATININE-BSD FRML MDRD: >90 ML/MIN/1.7M2
GLUCOSE SERPL-MCNC: 105 MG/DL (ref 70–99)
GLUCOSE UR STRIP-MCNC: NEGATIVE MG/DL
HCT VFR BLD AUTO: 24.6 % (ref 40–53)
HGB BLD-MCNC: 9.2 G/DL (ref 13.3–17.7)
HGB UR QL STRIP: NEGATIVE
HYALINE CASTS #/AREA URNS LPF: 11 /LPF (ref 0–2)
KETONES UR STRIP-MCNC: NEGATIVE MG/DL
LEUKOCYTE ESTERASE UR QL STRIP: NEGATIVE
MAGNESIUM SERPL-MCNC: 1.7 MG/DL (ref 1.6–2.3)
MAGNESIUM SERPL-MCNC: 2.1 MG/DL (ref 1.6–2.3)
MCH RBC QN AUTO: 29.8 PG (ref 26.5–33)
MCHC RBC AUTO-ENTMCNC: 37.4 G/DL (ref 31.5–36.5)
MCV RBC AUTO: 80 FL (ref 78–100)
MUCOUS THREADS #/AREA URNS LPF: PRESENT /LPF
NITRATE UR QL: NEGATIVE
PH UR STRIP: 5.5 PH (ref 5–7)
PHOSPHATE SERPL-MCNC: 4.8 MG/DL (ref 2.5–4.5)
PLATELET # BLD AUTO: 34 10E9/L (ref 150–450)
POTASSIUM SERPL-SCNC: 4.8 MMOL/L (ref 3.4–5.3)
PROT UR-MCNC: 0.27 G/L
PROT/CREAT 24H UR: 0.39 G/G CR (ref 0–0.2)
RBC # BLD AUTO: 3.09 10E12/L (ref 4.4–5.9)
RBC #/AREA URNS AUTO: <1 /HPF (ref 0–2)
SODIUM SERPL-SCNC: 136 MMOL/L (ref 133–144)
SOURCE: ABNORMAL
SP GR UR STRIP: 1.02 (ref 1–1.03)
SPECIMEN EXP DATE BLD: NORMAL
SPECIMEN SOURCE: NORMAL
TACROLIMUS BLD-MCNC: 14.9 UG/L (ref 5–15)
TME LAST DOSE: NORMAL H
UROBILINOGEN UR STRIP-MCNC: NORMAL MG/DL (ref 0–2)
WBC # BLD AUTO: 0.1 10E9/L (ref 4–11)
WBC #/AREA URNS AUTO: <1 /HPF (ref 0–5)
YEAST SPEC QL CULT: NORMAL

## 2018-11-16 PROCEDURE — 84100 ASSAY OF PHOSPHORUS: CPT | Performed by: NURSE PRACTITIONER

## 2018-11-16 PROCEDURE — 20000002 ZZH R&B BMT INTERMEDIATE

## 2018-11-16 PROCEDURE — 25000128 H RX IP 250 OP 636: Performed by: PEDIATRICS

## 2018-11-16 PROCEDURE — 86901 BLOOD TYPING SEROLOGIC RH(D): CPT | Performed by: NURSE PRACTITIONER

## 2018-11-16 PROCEDURE — 25000125 ZZHC RX 250: Performed by: PEDIATRICS

## 2018-11-16 PROCEDURE — 86850 RBC ANTIBODY SCREEN: CPT | Performed by: NURSE PRACTITIONER

## 2018-11-16 PROCEDURE — 80048 BASIC METABOLIC PNL TOTAL CA: CPT | Performed by: NURSE PRACTITIONER

## 2018-11-16 PROCEDURE — 83735 ASSAY OF MAGNESIUM: CPT | Performed by: NURSE PRACTITIONER

## 2018-11-16 PROCEDURE — 82330 ASSAY OF CALCIUM: CPT | Performed by: PEDIATRICS

## 2018-11-16 PROCEDURE — 25000132 ZZH RX MED GY IP 250 OP 250 PS 637: Performed by: PEDIATRICS

## 2018-11-16 PROCEDURE — 80197 ASSAY OF TACROLIMUS: CPT | Performed by: PEDIATRICS

## 2018-11-16 PROCEDURE — C9113 INJ PANTOPRAZOLE SODIUM, VIA: HCPCS | Performed by: PEDIATRICS

## 2018-11-16 PROCEDURE — 81001 URINALYSIS AUTO W/SCOPE: CPT | Performed by: NURSE PRACTITIONER

## 2018-11-16 PROCEDURE — 25000125 ZZHC RX 250: Performed by: NURSE PRACTITIONER

## 2018-11-16 PROCEDURE — 25000132 ZZH RX MED GY IP 250 OP 250 PS 637: Performed by: NURSE PRACTITIONER

## 2018-11-16 PROCEDURE — G0463 HOSPITAL OUTPT CLINIC VISIT: HCPCS

## 2018-11-16 PROCEDURE — C9399 UNCLASSIFIED DRUGS OR BIOLOG: HCPCS | Performed by: PEDIATRICS

## 2018-11-16 PROCEDURE — 85027 COMPLETE CBC AUTOMATED: CPT

## 2018-11-16 PROCEDURE — 86900 BLOOD TYPING SEROLOGIC ABO: CPT | Performed by: NURSE PRACTITIONER

## 2018-11-16 PROCEDURE — 25000128 H RX IP 250 OP 636: Performed by: NURSE PRACTITIONER

## 2018-11-16 PROCEDURE — 84156 ASSAY OF PROTEIN URINE: CPT | Performed by: NURSE PRACTITIONER

## 2018-11-16 RX ADMIN — VANCOMYCIN HYDROCHLORIDE 125 MG: KIT at 16:30

## 2018-11-16 RX ADMIN — Medication 350 MCG: at 23:47

## 2018-11-16 RX ADMIN — URSODIOL 250 MG: 250 TABLET ORAL at 08:10

## 2018-11-16 RX ADMIN — Medication 2 ML: at 23:47

## 2018-11-16 RX ADMIN — ACYCLOVIR SODIUM 650 MG: 50 INJECTION, SOLUTION INTRAVENOUS at 17:48

## 2018-11-16 RX ADMIN — Medication 2 G: at 14:47

## 2018-11-16 RX ADMIN — ACYCLOVIR SODIUM 650 MG: 50 INJECTION, SOLUTION INTRAVENOUS at 09:42

## 2018-11-16 RX ADMIN — DIPHENHYDRAMINE HYDROCHLORIDE 25 MG: 50 INJECTION, SOLUTION INTRAMUSCULAR; INTRAVENOUS at 04:11

## 2018-11-16 RX ADMIN — MORPHINE SULFATE: 5 INJECTION, SOLUTION INTRAVENOUS at 18:50

## 2018-11-16 RX ADMIN — Medication 2 ML: at 23:49

## 2018-11-16 RX ADMIN — MYCOPHENOLATE MOFETIL 960 MG: 500 INJECTION, POWDER, LYOPHILIZED, FOR SOLUTION INTRAVENOUS at 15:23

## 2018-11-16 RX ADMIN — Medication 2 G: at 04:10

## 2018-11-16 RX ADMIN — I.V. FAT EMULSION 240 ML: 20 EMULSION INTRAVENOUS at 20:39

## 2018-11-16 RX ADMIN — URSODIOL 250 MG: 250 TABLET ORAL at 20:37

## 2018-11-16 RX ADMIN — MYCOPHENOLATE MOFETIL 960 MG: 500 INJECTION, POWDER, LYOPHILIZED, FOR SOLUTION INTRAVENOUS at 07:16

## 2018-11-16 RX ADMIN — LORAZEPAM 0.8 MG: 2 INJECTION INTRAMUSCULAR; INTRAVENOUS at 08:09

## 2018-11-16 RX ADMIN — VANCOMYCIN HYDROCHLORIDE 125 MG: KIT at 08:10

## 2018-11-16 RX ADMIN — VANCOMYCIN HYDROCHLORIDE 125 MG: KIT at 20:38

## 2018-11-16 RX ADMIN — GRANISETRON HYDROCHLORIDE 1 MG: 1 INJECTION INTRAVENOUS at 08:15

## 2018-11-16 RX ADMIN — DEFIBROTIDE SODIUM 400 MG: 80 INJECTION, SOLUTION INTRAVENOUS at 19:09

## 2018-11-16 RX ADMIN — MICAFUNGIN SODIUM 150 MG: 10 INJECTION, POWDER, LYOPHILIZED, FOR SOLUTION INTRAVENOUS at 10:54

## 2018-11-16 RX ADMIN — DIPHENHYDRAMINE HYDROCHLORIDE 25 MG: 50 INJECTION, SOLUTION INTRAMUSCULAR; INTRAVENOUS at 12:27

## 2018-11-16 RX ADMIN — DEFIBROTIDE SODIUM 400 MG: 80 INJECTION, SOLUTION INTRAVENOUS at 12:32

## 2018-11-16 RX ADMIN — DIPHENHYDRAMINE HYDROCHLORIDE 25 MG: 50 INJECTION, SOLUTION INTRAMUSCULAR; INTRAVENOUS at 21:43

## 2018-11-16 RX ADMIN — ACYCLOVIR SODIUM 650 MG: 50 INJECTION, SOLUTION INTRAVENOUS at 00:43

## 2018-11-16 RX ADMIN — DEFIBROTIDE SODIUM 400 MG: 80 INJECTION, SOLUTION INTRAVENOUS at 05:11

## 2018-11-16 RX ADMIN — PANTOPRAZOLE SODIUM 40 MG: 40 INJECTION, POWDER, FOR SOLUTION INTRAVENOUS at 09:42

## 2018-11-16 RX ADMIN — Medication 2 G: at 21:12

## 2018-11-16 RX ADMIN — GRANISETRON HYDROCHLORIDE 1 MG: 1 INJECTION INTRAVENOUS at 21:12

## 2018-11-16 RX ADMIN — DEFIBROTIDE SODIUM 400 MG: 80 INJECTION, SOLUTION INTRAVENOUS at 00:43

## 2018-11-16 RX ADMIN — TACROLIMUS 0.01 MG/KG/DAY: 5 INJECTION, SOLUTION INTRAVENOUS at 20:39

## 2018-11-16 RX ADMIN — LORAZEPAM 0.8 MG: 2 INJECTION INTRAMUSCULAR; INTRAVENOUS at 21:44

## 2018-11-16 RX ADMIN — MYCOPHENOLATE MOFETIL 960 MG: 500 INJECTION, POWDER, LYOPHILIZED, FOR SOLUTION INTRAVENOUS at 21:51

## 2018-11-16 RX ADMIN — PHYTONADIONE: 1 INJECTION, EMULSION INTRAMUSCULAR; INTRAVENOUS; SUBCUTANEOUS at 20:39

## 2018-11-16 RX ADMIN — Medication 2 G: at 02:53

## 2018-11-16 NOTE — PROGRESS NOTES
Pediatric BMT Daily Progress Note    Interval Events: Afebrile. Brief epistaxis last night, minimal blood loss. No complaints of pain, seems more comfortable past 24 hours. WBC 0.1 today.  Review of Systems: Pertinent positives include those mentioned in interval events. A complete review of systems was performed and is otherwise negative.      Medications:  Please see MAR    Physical Exam:  Temp:  [98.6  F (37  C)-99.1  F (37.3  C)] 99.1  F (37.3  C)  Pulse:  [67-92] 92  Heart Rate:  [80-95] 80  Resp:  [16-20] 18  BP: ()/(52-74) 94/52  SpO2:  [97 %-99 %] 98 %  I/O last 3 completed shifts:  In: 2786.45 [I.V.:1174.12]  Out: 2850 [Urine:2850]  GEN: Awake, alert, talkative and well appearing. Mother present.  HEENT: Normocephalic, atraumatic, alopecia, sclera anicteric, non-injected, mouth with some erythema and breakdown on buccal mucosa and lateral aspects of tongue, nares patent without discharge    CARD: Heart regular rate and rhythm. No murmurs, rubs or gallops.  Cap refill 2 seconds.  RESP: Lungs clear to auscultation. No increased work of breathing, crackles or wheezes.   ABD: Non-distended, non-tender, bowel sounds hyperactive, mild tenderness  EXTREM: No edema noted  SKIN: No rashes noted    Labs:  Labs reviewed, pertinent findings BMP with BUN 18, Cr 0.57.  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 He is currently BMT Transplant Date: BMT Txp 11/2/2018 (14 days).      Afebrile. Seems to be feeling better, no complaints of pain. Still has dominga anal skin breakdown and choosing not to use recommended interventions.      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  Conditioning per 2015-29: TBI days -4 through -1 followed by matched sibling donor transplant on 11/2    - awaiting count recovery, WBC 0.1 today.      # Risk for GVHD: Post-transplant Cytoxan on days +3 and +4 complete   - Continue MMF   - Tacrolimus drip, supra therapeutic levels x 3 days, dose decreased 40% yesterday. Goal now 5-10 at day +14. Recheck pending today.    FEN/Renal:  # Risk for malnutrition: continue TPN/IL  - Dietician following, minimal intake       # Risk for electrolyte abnormalities:  - Check daily electrolytes. Given cardiac history (WPW), keep K>3.5, Mg>1.8, iCa nml.   - Mag and K sliding scale       # Risk for renal dysfunction and fluid overload:  - monitor I/O's and daily weights       # Risk for aHUS/TA-TMA:    - monitor LDH qMonday  - monitor urine protein/creatinine qTuesday      Pulmonary:  # Risk for pulmonary toxicity secondary to chemotherapy:   - monitor respiratory function per unit routine      # Rhinorrhea: 3 mos history, mild. Sinus CT (10/22) with findings of mucosal thickening without evidence of active infection.  RVP negative.      # Pneumomediastinum: finding upon work up Chest CT (10/22), asymptomatic. Per radiology, likely benign, perhaps transient.   - Patient instructed to notify care team with any SOB, dyspnea, or remarkable cough      Cardiovascular:  # Aaron-Parkinson-White Syndrome: diagnosed upon syncope in 02/2018. Asymptomatic, no interventions to date. Work up EKG c/w WPW, sinus bradycardia at 49 bpm. Work up ECHO normal with EF 68%.   Cardiology visit with Dr. Plummer (10/22): Holter revealed 10 PACs with no evidence of AV block. Normal Echocardiogram with EF 68%. Isael has hx of HR in high 30s with sleep.  - Avoid catecholinergics as able to avoid tachycardia  - Maintain normal iCa+, mag > 1.8, K > 3.5      # risk for hypertension secondary to medications:   - PRN hydralazine available      Heme:   # Pancytopenia secondary to chemotherapy: hx 1 platelet transfusion, well tolerated  - Transfuse for hemoglobin < 8, platelets < 30,000 (Defibrotide), no  premedications  - GCSF until ANC >1500 x3      Infectious Disease:  # Risk for infection given immunocompromised status:   Active: Afebrile, fever during conditioning, continue Cefepime through engraftment  - CVL insertion site with purulent drainage but no erythema -- sent wound cx 11/5 NGTD     # C.difficile infection: stool + 11/12. Continue oral vancomycin 4 x daily. Reports fewer episodes of diarrhea and no cramping.      ID history: 3 mos hx of mild rhinorrhea. Chest/sinus CT without evidence of infection. 10/22 RVP negative.      Prophylaxis:                                                                                                                        - viral prophylaxis: CMV neg (donor CMV positive), HSV positive. Acyclovir  - fungal prophylaxis: high risk - Micafungin    - bacterial prophylaxis: Cefepime through engraftment       Past infections: none significant       GI:   # Nausea management: Nausea without vomiting  - Scheduled medications: Kytril Q12H   - PRN medications: Ativan (also for anxiety), Benadryl, Marinol, Phenergan    - avoiding scop patch for now due to xerostomia      # Risk for VOD: especially high risk  previous Inotuzumab therapy.  Defibrotide ppx  - Ursodiol TID (10 mg/kg)      # Diarrhea:  See C.diff above    # Hemorrhoids: preparation H prn, has not complained of related discomfort in several days.    ENT:  #epistaxis: occasional drips, nasal mucosa dry  - Ayr gel PRN  - Vaseline to nares      Neuro:  # Mucositis/pain:   - Continue morphine continuous infusion with PCA per patient, PCA dose decreased 11/14 as not really helpful for dominga anal pruritis/pain and not having pain elsewhere. Isael reports generalized pain well controlled on current dosing.  - Integrative therapy     # Pruritis: Dominga anal area,  Continue scheduled IV benadryl 25 mg q8h as Isael feels this is most helpful.    Derm:  # Skin breakdown, perianal area: erythema surrounding anus, no open wounds noted on  exam today, 2 areas of scab formation, no evidence of infection. Also erythematous and dry under scrotal sac. Consulted WOC RN , directed team to follow incontinence protocol. Education provided, treatment declined so far by Isael.  - Has medi honey, silvadene cream, menthol-zinc cream and tucks pads available if desired.       The above plan of care was developed by and communicated to me by the   Pediatric BMT attending physician, Dr. Jojo Haider.      BENJA Chen  [facility]  Pediatric Blood and Marrow Transplant  [phone]  Pager  [phone]  [facility]  [phone]  BMT hospital workroom      Pediatric BMT Attending Inpatient Note:  Artemio was seen and evaluated by me today.     The significant interval history includes WBC 0.1 today, less discomfort in perianal area (no open erosions on exam), no substantial stool x3 days, remains afebrile. Self limited episode of epistaxis yesterday.      I have reviewed changes and data from the last 24 hours, including medications, laboratory results, vital signs and consultant recommendations.     I have formulated and discussed the plan with the BMT team.  The relevant clinical topics addressed included the followin19 y/o male with very high risk ALL admitted for MSD BMT, awaiting count recovery requiring intermittent blood product transfusions and on daily GCSF, at risk for GvHD on tacrolimus and MMF, nausea, h/o WPW with tight electrolyte control, h/o fever on empiric cefepime, C. Diff on PO vancomycin, and risk for opportunistic infections , perianal skin breakdown improved, mucositis on TPN/IL and morphine PCA, at risk for VOD on prophylactic defibrotide and ursodiol. Anticipatory guidance provided on engraftment, GI and skin healing.    I discussed the course and plan with the patient/family and answered all of their questions to the best of my ability.    My care coordination activities today  include oversight of planned lab studies, oversight of medication changes, discussion with BMT team-members and discussion with consultants.    My total floor time today was at least 40 minutes, greater than 50% of which was counseling and coordination of care.    Jojo Haider MD MPH  , Pediatric Blood and Marrow Transplantation  [facility] [phone]

## 2018-11-16 NOTE — PLAN OF CARE
"Problem: Patient Care Overview  Goal: Plan of Care/Patient Progress Review  Outcome: No Change  Patient afebrile. TMAX 99.0. Vital signs stable and within parameter. LSC. O2 sats adequate on RA. Good urine output overnight. No stool. No C/o of nausea or vomiting. Patient reported that his pain was \"tolerable\" overnight. Magnesium replaced. Father at bedside. Hourly rounding completed. Continue to monitor.       "

## 2018-11-16 NOTE — PROGRESS NOTES
WOC Consult    S: Received provider consult for perianal wounds in the setting of loose stools and C-diff infection. 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  Conditioning per 2015-29: TBI days -4 through -1 followed by matched sibling donor transplant on 11/2.am Exam of periaanl area reveal 2 fissures of about 1-2cm each with no drainage present and some old dried blood in same region which he would not allow me to cleanse away to visualize further, fissures are painful and he presently wants to follow orders that have been recommened by MD( Medihoney and witch hazel pads), parent spoke with me separately and said he is fearful of passing stool s now du eto pain form the fissures and they were askin for something topical that could be applied right before he defecated to prevent pain    A:  2 painful open fissures to anus( spoke with nursing about concerns with pain )    P: Nurse will speak with MD to see if topical anestheitc can be applied prior to using toilet, WOC will be involved only as needed as MD now providing direction in care    Carol Rodríguez RN, CWOCN

## 2018-11-16 NOTE — PLAN OF CARE
Problem: Patient Care Overview  Goal: Plan of Care/Patient Progress Review  Pt afebrile, lungs clear/diminished, VSS. 10 bumps taken this evening, 1 denied. Refusing interventions for dominga area.No nausea stool or vomiting reported. Good urine output. Small nose bleed noted this evening, educated pt regarding interventions to stop nose bleed. Afrin ordered and nose bleed stopped prior to administration. Continue to monitor. Hourly rounding completed. Continue plan of care.

## 2018-11-17 LAB
ANION GAP SERPL CALCULATED.3IONS-SCNC: 7 MMOL/L (ref 3–14)
BACTERIA SPEC CULT: NO GROWTH
BACTERIA SPEC CULT: NO GROWTH
BLD PROD TYP BPU: NORMAL
BLD PROD TYP BPU: NORMAL
BLD UNIT ID BPU: 0
BLOOD PRODUCT CODE: NORMAL
BPU ID: NORMAL
BUN SERPL-MCNC: 21 MG/DL (ref 7–30)
CALCIUM SERPL-MCNC: 9.2 MG/DL (ref 8.5–10.1)
CHLORIDE SERPL-SCNC: 104 MMOL/L (ref 94–109)
CO2 SERPL-SCNC: 25 MMOL/L (ref 20–32)
CREAT SERPL-MCNC: 0.66 MG/DL (ref 0.66–1.25)
DIFFERENTIAL METHOD BLD: ABNORMAL
ERYTHROCYTE [DISTWIDTH] IN BLOOD BY AUTOMATED COUNT: 12 % (ref 10–15)
GFR SERPL CREATININE-BSD FRML MDRD: >90 ML/MIN/1.7M2
GLUCOSE SERPL-MCNC: 113 MG/DL (ref 70–99)
HCT VFR BLD AUTO: 23.3 % (ref 40–53)
HGB BLD-MCNC: 8.6 G/DL (ref 13.3–17.7)
Lab: NORMAL
Lab: NORMAL
MCH RBC QN AUTO: 29.7 PG (ref 26.5–33)
MCHC RBC AUTO-ENTMCNC: 36.9 G/DL (ref 31.5–36.5)
MCV RBC AUTO: 80 FL (ref 78–100)
NUM BPU REQUESTED: 1
PLATELET # BLD AUTO: 19 10E9/L (ref 150–450)
POTASSIUM SERPL-SCNC: 4.6 MMOL/L (ref 3.4–5.3)
RBC # BLD AUTO: 2.9 10E12/L (ref 4.4–5.9)
SODIUM SERPL-SCNC: 136 MMOL/L (ref 133–144)
SPECIMEN SOURCE: NORMAL
SPECIMEN SOURCE: NORMAL
TACROLIMUS BLD-MCNC: 11.4 UG/L (ref 5–15)
TME LAST DOSE: NORMAL H
TRANSFUSION STATUS PATIENT QL: NORMAL
TRANSFUSION STATUS PATIENT QL: NORMAL
WBC # BLD AUTO: 0.2 10E9/L (ref 4–11)

## 2018-11-17 PROCEDURE — 25000128 H RX IP 250 OP 636: Performed by: NURSE PRACTITIONER

## 2018-11-17 PROCEDURE — C9113 INJ PANTOPRAZOLE SODIUM, VIA: HCPCS | Performed by: PEDIATRICS

## 2018-11-17 PROCEDURE — 25800025 ZZH RX 258: Performed by: PEDIATRICS

## 2018-11-17 PROCEDURE — 25000128 H RX IP 250 OP 636: Performed by: PEDIATRICS

## 2018-11-17 PROCEDURE — C9399 UNCLASSIFIED DRUGS OR BIOLOG: HCPCS | Performed by: PEDIATRICS

## 2018-11-17 PROCEDURE — 25000125 ZZHC RX 250: Performed by: NURSE PRACTITIONER

## 2018-11-17 PROCEDURE — 25000125 ZZHC RX 250: Performed by: PEDIATRICS

## 2018-11-17 PROCEDURE — 20000002 ZZH R&B BMT INTERMEDIATE

## 2018-11-17 PROCEDURE — 80048 BASIC METABOLIC PNL TOTAL CA: CPT | Performed by: NURSE PRACTITIONER

## 2018-11-17 PROCEDURE — P9037 PLATE PHERES LEUKOREDU IRRAD: HCPCS | Performed by: PEDIATRICS

## 2018-11-17 PROCEDURE — 25000132 ZZH RX MED GY IP 250 OP 250 PS 637: Performed by: NURSE PRACTITIONER

## 2018-11-17 PROCEDURE — 25000132 ZZH RX MED GY IP 250 OP 250 PS 637: Performed by: PEDIATRICS

## 2018-11-17 PROCEDURE — 80197 ASSAY OF TACROLIMUS: CPT | Performed by: PEDIATRICS

## 2018-11-17 PROCEDURE — 85027 COMPLETE CBC AUTOMATED: CPT

## 2018-11-17 RX ORDER — LORAZEPAM 2 MG/ML
0.8 INJECTION INTRAMUSCULAR EVERY 4 HOURS PRN
Status: DISCONTINUED | OUTPATIENT
Start: 2018-11-17 | End: 2018-11-23

## 2018-11-17 RX ADMIN — GRANISETRON HYDROCHLORIDE 1 MG: 1 INJECTION INTRAVENOUS at 09:11

## 2018-11-17 RX ADMIN — WITCH HAZEL: 500 SOLUTION RECTAL; TOPICAL at 15:53

## 2018-11-17 RX ADMIN — VANCOMYCIN HYDROCHLORIDE 125 MG: KIT at 16:58

## 2018-11-17 RX ADMIN — Medication 2 G: at 20:45

## 2018-11-17 RX ADMIN — MYCOPHENOLATE MOFETIL 960 MG: 500 INJECTION, POWDER, LYOPHILIZED, FOR SOLUTION INTRAVENOUS at 14:41

## 2018-11-17 RX ADMIN — VANCOMYCIN HYDROCHLORIDE 125 MG: KIT at 11:30

## 2018-11-17 RX ADMIN — POTASSIUM CHLORIDE, DEXTROSE MONOHYDRATE AND SODIUM CHLORIDE: 75; 5; 450 INJECTION, SOLUTION INTRAVENOUS at 20:10

## 2018-11-17 RX ADMIN — DEFIBROTIDE SODIUM 400 MG: 80 INJECTION, SOLUTION INTRAVENOUS at 00:27

## 2018-11-17 RX ADMIN — Medication 2 G: at 14:07

## 2018-11-17 RX ADMIN — TACROLIMUS 0.01 MG/KG/DAY: 5 INJECTION, SOLUTION INTRAVENOUS at 20:37

## 2018-11-17 RX ADMIN — Medication 2 G: at 04:06

## 2018-11-17 RX ADMIN — Medication 2 ML: at 20:45

## 2018-11-17 RX ADMIN — GRANISETRON HYDROCHLORIDE 1 MG: 1 INJECTION INTRAVENOUS at 21:31

## 2018-11-17 RX ADMIN — DEFIBROTIDE SODIUM 400 MG: 80 INJECTION, SOLUTION INTRAVENOUS at 06:55

## 2018-11-17 RX ADMIN — MICAFUNGIN SODIUM 150 MG: 10 INJECTION, POWDER, LYOPHILIZED, FOR SOLUTION INTRAVENOUS at 10:39

## 2018-11-17 RX ADMIN — Medication 350 MCG: at 20:27

## 2018-11-17 RX ADMIN — URSODIOL 250 MG: 250 TABLET ORAL at 14:30

## 2018-11-17 RX ADMIN — Medication 1 ML: at 20:27

## 2018-11-17 RX ADMIN — DIPHENHYDRAMINE HYDROCHLORIDE 25 MG: 50 INJECTION, SOLUTION INTRAMUSCULAR; INTRAVENOUS at 21:32

## 2018-11-17 RX ADMIN — ACYCLOVIR SODIUM 650 MG: 50 INJECTION, SOLUTION INTRAVENOUS at 09:11

## 2018-11-17 RX ADMIN — URSODIOL 250 MG: 250 TABLET ORAL at 20:44

## 2018-11-17 RX ADMIN — ACYCLOVIR SODIUM 650 MG: 50 INJECTION, SOLUTION INTRAVENOUS at 16:57

## 2018-11-17 RX ADMIN — PHYTONADIONE: 1 INJECTION, EMULSION INTRAMUSCULAR; INTRAVENOUS; SUBCUTANEOUS at 20:10

## 2018-11-17 RX ADMIN — ACYCLOVIR SODIUM 650 MG: 50 INJECTION, SOLUTION INTRAVENOUS at 00:27

## 2018-11-17 RX ADMIN — DEFIBROTIDE SODIUM 400 MG: 80 INJECTION, SOLUTION INTRAVENOUS at 18:09

## 2018-11-17 RX ADMIN — DIPHENHYDRAMINE HYDROCHLORIDE 25 MG: 50 INJECTION, SOLUTION INTRAMUSCULAR; INTRAVENOUS at 03:46

## 2018-11-17 RX ADMIN — LORAZEPAM 0.8 MG: 2 INJECTION INTRAMUSCULAR; INTRAVENOUS at 19:00

## 2018-11-17 RX ADMIN — MYCOPHENOLATE MOFETIL 960 MG: 500 INJECTION, POWDER, LYOPHILIZED, FOR SOLUTION INTRAVENOUS at 04:43

## 2018-11-17 RX ADMIN — I.V. FAT EMULSION 240 ML: 20 EMULSION INTRAVENOUS at 20:40

## 2018-11-17 RX ADMIN — DEFIBROTIDE SODIUM 400 MG: 80 INJECTION, SOLUTION INTRAVENOUS at 12:03

## 2018-11-17 RX ADMIN — MYCOPHENOLATE MOFETIL 960 MG: 500 INJECTION, POWDER, LYOPHILIZED, FOR SOLUTION INTRAVENOUS at 21:32

## 2018-11-17 RX ADMIN — PANTOPRAZOLE SODIUM 40 MG: 40 INJECTION, POWDER, FOR SOLUTION INTRAVENOUS at 09:11

## 2018-11-17 RX ADMIN — DIPHENHYDRAMINE HYDROCHLORIDE 25 MG: 50 INJECTION, SOLUTION INTRAMUSCULAR; INTRAVENOUS at 12:03

## 2018-11-17 RX ADMIN — MORPHINE SULFATE: 5 INJECTION, SOLUTION INTRAVENOUS at 20:31

## 2018-11-17 RX ADMIN — VANCOMYCIN HYDROCHLORIDE 125 MG: KIT at 21:00

## 2018-11-17 NOTE — PLAN OF CARE
Problem: Patient Care Overview  Goal: Plan of Care/Patient Progress Review  Outcome: No Change  Artemio afebrile, VS stable. LSC. Denied pain/n/v. Continues on Tacro and Morphine drips. Took 3 bumps from PCA. Good UOP. No stool. Small bloody nose this AM, stopped within 10 minutes. Small scab picked off and bleeding, pressure dressing applied and stopped. Lying in bed all of shift. Dad at bedside. Hourly rounding completed, continue with POC.

## 2018-11-17 NOTE — PROGRESS NOTES
Pediatric BMT Daily Progress Note    Interval Events: Afebrile. Continues with intermittent mild epistaxis episodes. Feels hot much of the time which makes him uncomfortable. No stool output in the last couple of days.     Review of Systems: Pertinent positives include those mentioned in interval events. A complete review of systems was performed and is otherwise negative.      Medications:  Please see MAR    Physical Exam:  Temp:  [98.6  F (37  C)-99.9  F (37.7  C)] 99.9  F (37.7  C)  Pulse:  [83-99] 88  Resp:  [16-20] 20  BP: ()/(50-66) 97/56  Cuff Mean (mmHg):  [66] 66  SpO2:  [95 %-99 %] 97 %  I/O last 3 completed shifts:  In: 3397.15 [I.V.:1465.15]  Out: 2275 [Urine:2275]  GEN: Awake, alert, well appearing. Mother present.  HEENT: Normocephalic, atraumatic, alopecia, sclera anicteric, non-injected, mouth with some erythema and breakdown on buccal mucosa and lateral aspects of tongue, nares patent without discharge    CARD: Heart regular rate and rhythm. No murmurs, rubs or gallops.  Cap refill 2 seconds.  RESP: Lungs clear to auscultation. No increased work of breathing, crackles or wheezes.   ABD: Non-distended, non-tender, bowel sounds hyperactive, mild tenderness  EXTREM: No edema noted  SKIN: No rashes noted    Labs:  Labs reviewed, pertinent findings BMP with BUN 21, Cr 0.66.  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 He is currently BMT Transplant Date: BMT Txp 11/2/2018 (15 days).      Afebrile, white blood cell count increasing. Seems to be feeling better, no complaints of pain. Still has dominga anal skin breakdown and choosing not to use recommended interventions.      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  Conditioning per 2015-29: TBI days -4  through -1 followed by matched sibling donor transplant on 11/2   - Awaiting count recovery, WBC 0.2 today.      # Risk for GVHD: Post-transplant Cytoxan on days +3 and +4 complete   - Continue MMF   - Continue tacrolimus drip (goal 5-10). Level high today at 11.6. Dose decreased and we will repeat a level tomorrow.      FEN/Renal:  # Risk for malnutrition: continue TPN/IL  - Dietician following, minimal intake       # Risk for electrolyte abnormalities:  - Check daily electrolytes. Given cardiac history (WPW), keep K>3.5, Mg>1.8, iCa nml.   - Mag and K sliding scale       # Risk for renal dysfunction and fluid overload:  - monitor I/O's and daily weights       # Risk for aHUS/TA-TMA:    - monitor LDH qMonday  - monitor urine protein/creatinine qTuesday      Pulmonary:  # Risk for pulmonary toxicity secondary to chemotherapy:   - monitor respiratory function per unit routine      # Rhinorrhea: 3 mos history, mild. Sinus CT (10/22) with findings of mucosal thickening without evidence of active infection.  RVP negative.      # Pneumomediastinum: finding upon work up Chest CT (10/22), asymptomatic. Per radiology, likely benign, perhaps transient.   - Patient instructed to notify care team with any SOB, dyspnea, or remarkable cough      Cardiovascular:  # Aaron-Parkinson-White Syndrome: diagnosed upon syncope in 02/2018. Asymptomatic, no interventions to date. Work up EKG c/w WPW, sinus bradycardia at 49 bpm. Work up ECHO normal with EF 68%.   Cardiology visit with Dr. Plummer (10/22): Holter revealed 10 PACs with no evidence of AV block. Normal Echocardiogram with EF 68%. Isael has hx of HR in high 30s with sleep.  - Avoid catecholinergics as able to avoid tachycardia  - Maintain normal iCa+, mag > 1.8, K > 3.5      # Risk for hypertension secondary to medications:   - PRN hydralazine available      Heme:   # Pancytopenia secondary to chemotherapy: hx 1 platelet transfusion, well tolerated  - Transfuse for hemoglobin  < 8, platelets < 30,000 (Defibrotide), no premedications  - GCSF until ANC >1500 x3 days      Infectious Disease:  # Risk for infection given immunocompromised status:   Active: Fever during conditioning, but afebrile now. Continue Cefepime through engraftment  Prophylaxis:                                                                                                                        - viral prophylaxis: CMV neg (donor CMV positive), HSV positive. Acyclovir  - fungal prophylaxis: high risk - Micafungin    - bacterial prophylaxis: Cefepime through engraftment       # C.difficile colitis: Stool output improved. Continue oral vancomycin 4 x daily.       GI:   # Nausea management: Nausea without vomiting  - Scheduled medications: Kytril Q12H   - PRN medications: Ativan (also for anxiety), Benadryl, Marinol, Phenergan    - avoiding scop patch for now due to xerostomia      # Risk for VOD: especially high risk  previous Inotuzumab therapy.  Defibrotide ppx  - Ursodiol TID (10 mg/kg)    # Hemorrhoids: preparation H prn, has not complained of related discomfort in several days.    ENT:  # Epistaxis: occasional drips, nasal mucosa dry  - Ayr gel PRN  - Vaseline to nares      Neuro:  # Mucositis/pain:   - Continue morphine continuous infusion with PCA per patient, PCA dose decreased 11/14 as not really helpful for dominga anal pruritis/pain and not having pain elsewhere. Isael reports generalized pain well controlled on current dosing.  - Integrative therapy     # Pruritis: Dominga anal area,  Continue scheduled IV benadryl 25 mg q8h as Isael feels this is most helpful.    Derm:  # Skin breakdown, perianal area: erythema surrounding anus, no open wounds noted, 2 areas of scab formation, no evidence of infection. Also erythematous and dry under scrotal sac. Consulted WOC RN 11/14, directed team to follow incontinence protocol. Education provided, treatment declined so far by Isael.  - Has medi honey, silvadene cream,  menthol-zinc cream and tucks pads available if desired.       The above plan of care was developed by and communicated to me by the Pediatric BMT attending physician, Dr. Jojo Haider.      Jaquelin Senior MD  Peds BMT [facility]      Pediatric BMT Attending Inpatient Note:  Artemio was seen and evaluated by me today.     The significant interval history includes WBC 0.2 today, epistaxis episode again this morning, no stool, afebrile. Perianal area healing.     I have reviewed changes and data from the last 24 hours, including medications, laboratory results, vital signs and consultant recommendations.     I have formulated and discussed the plan with the BMT team.  The relevant clinical topics addressed included the followin19 y/o male with very high risk ALL admitted for MSD BMT, awaiting count recovery requiring intermittent blood product transfusions and on daily GCSF, self-limited epistaxis episodes, at risk for GvHD on tacrolimus and MMF, nausea, h/o WPW with tight electrolyte control, h/o fever on empiric cefepime, C. Diff on PO vancomycin, and risk for opportunistic infections , perianal skin breakdown improved, mucositis on TPN/IL and morphine PCA, at risk for VOD on prophylactic defibrotide and ursodiol. Anticipatory guidance provided on engraftment, GI and skin healing.    I discussed the course and plan with the patient/family and answered all of their questions to the best of my ability.    My care coordination activities today include oversight of planned lab studies, oversight of medication changes, discussion with BMT team-members and discussion with consultants.    My total floor time today was at least 35 minutes, greater than 50% of which was counseling and coordination of care.    Jojo Haider MD MPH  , Pediatric Blood and Marrow Transplantation  [facility], [place] [phone]

## 2018-11-17 NOTE — PLAN OF CARE
Problem: Stem Cell/Bone Marrow Transplant (Adult)  Goal: Signs and Symptoms of Listed Potential Problems Will be Absent, Minimized or Managed (Stem Cell/Bone Marrow Transplant)  Signs and symptoms of listed potential problems will be absent, minimized or managed by discharge/transition of care (reference Stem Cell/Bone Marrow Transplant (Adult) CPG).   Outcome: No Change  Pt Afebrile.  VSS. Lungs clear on room air.  Pt received platelets overnight with no complications.  Intermittently nautious, PRN ativan x 1. Pain remains unchanged and continues to have abdominal cramping, no stool this shift. Pt took 9 bumps from PCA, 1 denied overnight.  Tacro and morphine drips remain unchanged. Mom at bedside.  Hourly rounding complete

## 2018-11-17 NOTE — PLAN OF CARE
Problem: Stem Cell/Bone Marrow Transplant (Adult)  Goal: Signs and Symptoms of Listed Potential Problems Will be Absent, Minimized or Managed (Stem Cell/Bone Marrow Transplant)  Signs and symptoms of listed potential problems will be absent, minimized or managed by discharge/transition of care (reference Stem Cell/Bone Marrow Transplant (Adult) CPG).   Outcome: No Change  Pt afebrile.  VSS and lung sounds are clear.  Pt stated pain was okay, but took 14 bumps from his PCA and was denied 3.  Morphine gtt continues per MD order.  Pt received prn ativan for nausea.  Pt c/o itching on the top of his feet and anal area.  Pt parents at bedside, attentive to pt.  Pt slept majority of the day.  Hourly rounding completed.  Continue with POC.

## 2018-11-18 LAB
ANION GAP SERPL CALCULATED.3IONS-SCNC: 6 MMOL/L (ref 3–14)
ANISOCYTOSIS BLD QL SMEAR: ABNORMAL
BASOPHILS # BLD AUTO: 0 10E9/L (ref 0–0.2)
BASOPHILS NFR BLD AUTO: 0 %
BLD PROD TYP BPU: NORMAL
BLD PROD TYP BPU: NORMAL
BLD UNIT ID BPU: 0
BLOOD PRODUCT CODE: NORMAL
BPU ID: NORMAL
BUN SERPL-MCNC: 22 MG/DL (ref 7–30)
CALCIUM SERPL-MCNC: 9.2 MG/DL (ref 8.5–10.1)
CHLORIDE SERPL-SCNC: 104 MMOL/L (ref 94–109)
CMV DNA SPEC NAA+PROBE-ACNC: NORMAL [IU]/ML
CMV DNA SPEC NAA+PROBE-LOG#: NORMAL {LOG_IU}/ML
CO2 SERPL-SCNC: 26 MMOL/L (ref 20–32)
CREAT SERPL-MCNC: 0.66 MG/DL (ref 0.66–1.25)
DACRYOCYTES BLD QL SMEAR: SLIGHT
DIFFERENTIAL METHOD BLD: ABNORMAL
EOSINOPHIL # BLD AUTO: 0 10E9/L (ref 0–0.7)
EOSINOPHIL NFR BLD AUTO: 0 %
ERYTHROCYTE [DISTWIDTH] IN BLOOD BY AUTOMATED COUNT: 11.9 % (ref 10–15)
GFR SERPL CREATININE-BSD FRML MDRD: >90 ML/MIN/1.7M2
GLUCOSE SERPL-MCNC: 148 MG/DL (ref 70–99)
HCT VFR BLD AUTO: 23.3 % (ref 40–53)
HGB BLD-MCNC: 8.4 G/DL (ref 13.3–17.7)
LYMPHOCYTES # BLD AUTO: 0 10E9/L (ref 0.8–5.3)
LYMPHOCYTES NFR BLD AUTO: 1.5 %
MCH RBC QN AUTO: 29.5 PG (ref 26.5–33)
MCHC RBC AUTO-ENTMCNC: 36.1 G/DL (ref 31.5–36.5)
MCV RBC AUTO: 82 FL (ref 78–100)
MICROCYTES BLD QL SMEAR: PRESENT
MONOCYTES # BLD AUTO: 0.1 10E9/L (ref 0–1.3)
MONOCYTES NFR BLD AUTO: 20.5 %
NEUTROPHILS # BLD AUTO: 0.5 10E9/L (ref 1.6–8.3)
NEUTROPHILS NFR BLD AUTO: 78 %
NUM BPU REQUESTED: 1
PLATELET # BLD AUTO: 26 10E9/L (ref 150–450)
PLATELET # BLD EST: ABNORMAL 10*3/UL
POIKILOCYTOSIS BLD QL SMEAR: SLIGHT
POTASSIUM SERPL-SCNC: 4.5 MMOL/L (ref 3.4–5.3)
RBC # BLD AUTO: 2.85 10E12/L (ref 4.4–5.9)
RBC INCLUSIONS BLD: SLIGHT
SODIUM SERPL-SCNC: 136 MMOL/L (ref 133–144)
SPECIMEN SOURCE: NORMAL
TRANSFUSION STATUS PATIENT QL: NORMAL
TRANSFUSION STATUS PATIENT QL: NORMAL
WBC # BLD AUTO: 0.6 10E9/L (ref 4–11)

## 2018-11-18 PROCEDURE — 85027 COMPLETE CBC AUTOMATED: CPT

## 2018-11-18 PROCEDURE — 85025 COMPLETE CBC W/AUTO DIFF WBC: CPT | Performed by: NURSE PRACTITIONER

## 2018-11-18 PROCEDURE — 20000002 ZZH R&B BMT INTERMEDIATE

## 2018-11-18 PROCEDURE — 80048 BASIC METABOLIC PNL TOTAL CA: CPT | Performed by: NURSE PRACTITIONER

## 2018-11-18 PROCEDURE — 25000132 ZZH RX MED GY IP 250 OP 250 PS 637: Performed by: PEDIATRICS

## 2018-11-18 PROCEDURE — C9113 INJ PANTOPRAZOLE SODIUM, VIA: HCPCS | Performed by: PEDIATRICS

## 2018-11-18 PROCEDURE — 25000125 ZZHC RX 250: Performed by: PEDIATRICS

## 2018-11-18 PROCEDURE — 25000128 H RX IP 250 OP 636: Performed by: PEDIATRICS

## 2018-11-18 PROCEDURE — 25000128 H RX IP 250 OP 636: Performed by: NURSE PRACTITIONER

## 2018-11-18 PROCEDURE — 80197 ASSAY OF TACROLIMUS: CPT | Performed by: PEDIATRICS

## 2018-11-18 PROCEDURE — P9037 PLATE PHERES LEUKOREDU IRRAD: HCPCS | Performed by: PEDIATRICS

## 2018-11-18 PROCEDURE — 25000132 ZZH RX MED GY IP 250 OP 250 PS 637: Performed by: NURSE PRACTITIONER

## 2018-11-18 PROCEDURE — 25000125 ZZHC RX 250: Performed by: NURSE PRACTITIONER

## 2018-11-18 PROCEDURE — C9399 UNCLASSIFIED DRUGS OR BIOLOG: HCPCS | Performed by: PEDIATRICS

## 2018-11-18 RX ADMIN — URSODIOL 250 MG: 250 TABLET ORAL at 15:04

## 2018-11-18 RX ADMIN — ACYCLOVIR SODIUM 650 MG: 50 INJECTION, SOLUTION INTRAVENOUS at 16:51

## 2018-11-18 RX ADMIN — VANCOMYCIN HYDROCHLORIDE 125 MG: KIT at 12:00

## 2018-11-18 RX ADMIN — ACYCLOVIR SODIUM 650 MG: 50 INJECTION, SOLUTION INTRAVENOUS at 08:39

## 2018-11-18 RX ADMIN — DEFIBROTIDE SODIUM 400 MG: 80 INJECTION, SOLUTION INTRAVENOUS at 23:41

## 2018-11-18 RX ADMIN — PANTOPRAZOLE SODIUM 40 MG: 40 INJECTION, POWDER, FOR SOLUTION INTRAVENOUS at 08:00

## 2018-11-18 RX ADMIN — DEFIBROTIDE SODIUM 400 MG: 80 INJECTION, SOLUTION INTRAVENOUS at 12:00

## 2018-11-18 RX ADMIN — WITCH HAZEL: 500 SOLUTION RECTAL; TOPICAL at 11:47

## 2018-11-18 RX ADMIN — DEFIBROTIDE SODIUM 400 MG: 80 INJECTION, SOLUTION INTRAVENOUS at 06:44

## 2018-11-18 RX ADMIN — VANCOMYCIN HYDROCHLORIDE 125 MG: KIT at 16:24

## 2018-11-18 RX ADMIN — VANCOMYCIN HYDROCHLORIDE 125 MG: KIT at 08:36

## 2018-11-18 RX ADMIN — I.V. FAT EMULSION 240 ML: 20 EMULSION INTRAVENOUS at 20:17

## 2018-11-18 RX ADMIN — DIPHENHYDRAMINE HYDROCHLORIDE 25 MG: 50 INJECTION, SOLUTION INTRAMUSCULAR; INTRAVENOUS at 20:15

## 2018-11-18 RX ADMIN — TACROLIMUS 0.01 MG/KG/DAY: 5 INJECTION, SOLUTION INTRAVENOUS at 20:17

## 2018-11-18 RX ADMIN — GRANISETRON HYDROCHLORIDE 1 MG: 1 INJECTION INTRAVENOUS at 08:38

## 2018-11-18 RX ADMIN — VANCOMYCIN HYDROCHLORIDE 125 MG: KIT at 20:11

## 2018-11-18 RX ADMIN — URSODIOL 250 MG: 250 TABLET ORAL at 20:11

## 2018-11-18 RX ADMIN — DIPHENHYDRAMINE HYDROCHLORIDE 25 MG: 50 INJECTION, SOLUTION INTRAMUSCULAR; INTRAVENOUS at 03:11

## 2018-11-18 RX ADMIN — MYCOPHENOLATE MOFETIL 960 MG: 500 INJECTION, POWDER, LYOPHILIZED, FOR SOLUTION INTRAVENOUS at 21:27

## 2018-11-18 RX ADMIN — Medication 2 G: at 20:16

## 2018-11-18 RX ADMIN — DEFIBROTIDE SODIUM 400 MG: 80 INJECTION, SOLUTION INTRAVENOUS at 18:18

## 2018-11-18 RX ADMIN — MYCOPHENOLATE MOFETIL 960 MG: 500 INJECTION, POWDER, LYOPHILIZED, FOR SOLUTION INTRAVENOUS at 14:42

## 2018-11-18 RX ADMIN — Medication 2 ML: at 21:26

## 2018-11-18 RX ADMIN — Medication 2 G: at 14:03

## 2018-11-18 RX ADMIN — Medication 2 G: at 04:00

## 2018-11-18 RX ADMIN — DIPHENHYDRAMINE HYDROCHLORIDE 25 MG: 50 INJECTION, SOLUTION INTRAMUSCULAR; INTRAVENOUS at 11:42

## 2018-11-18 RX ADMIN — MYCOPHENOLATE MOFETIL 960 MG: 500 INJECTION, POWDER, LYOPHILIZED, FOR SOLUTION INTRAVENOUS at 04:30

## 2018-11-18 RX ADMIN — Medication 350 MCG: at 20:53

## 2018-11-18 RX ADMIN — PHYTONADIONE: 1 INJECTION, EMULSION INTRAMUSCULAR; INTRAVENOUS; SUBCUTANEOUS at 20:17

## 2018-11-18 RX ADMIN — DEFIBROTIDE SODIUM 400 MG: 80 INJECTION, SOLUTION INTRAVENOUS at 00:02

## 2018-11-18 RX ADMIN — ACYCLOVIR SODIUM 650 MG: 50 INJECTION, SOLUTION INTRAVENOUS at 23:41

## 2018-11-18 RX ADMIN — GRANISETRON HYDROCHLORIDE 1 MG: 1 INJECTION INTRAVENOUS at 20:16

## 2018-11-18 RX ADMIN — Medication 2 ML: at 20:53

## 2018-11-18 RX ADMIN — URSODIOL 250 MG: 250 TABLET ORAL at 08:36

## 2018-11-18 RX ADMIN — MICAFUNGIN SODIUM 150 MG: 10 INJECTION, POWDER, LYOPHILIZED, FOR SOLUTION INTRAVENOUS at 10:22

## 2018-11-18 RX ADMIN — ACYCLOVIR SODIUM 650 MG: 50 INJECTION, SOLUTION INTRAVENOUS at 00:02

## 2018-11-18 RX ADMIN — WITCH HAZEL: 500 SOLUTION RECTAL; TOPICAL at 15:06

## 2018-11-18 NOTE — PLAN OF CARE
Problem: Patient Care Overview  Goal: Plan of Care/Patient Progress Review  Outcome: Improving  Artemio has been afebrile, BP soft overnight, OVSS.  Lungs clear.  Pain remains unchanged, 10 bumps from PCA.  No complaints of nausea.  No PRNs given.  Platelets replaced x1.  Mother at bedside and attentive.  Hourly rouding completed.  Continue with POC.

## 2018-11-18 NOTE — PROGRESS NOTES
Pediatric BMT Daily Progress Note    Interval Events: Afebrile. No tachycardia or bradycardia, presented with softer blood pressures than his baseline overnight. Several days without stool,  75 ml of stool in last 24 hours no reports of straining. Morphine PCA received 10 bumps overnight, able to sleeping overnight. Now with emerging counts   Review of Systems: Pertinent positives include those mentioned in interval events. A complete review of systems was performed and is otherwise negative.      Medications:  Please see MAR    Physical Exam:  Temp:  [97.6  F (36.4  C)-99.7  F (37.6  C)] 98.2  F (36.8  C)  Pulse:  [] 79  Resp:  [16-20] 16  BP: ()/(40-73) 80/40  SpO2:  [97 %-99 %] 97 %  I/O last 3 completed shifts:  In: 3134.76 [I.V.:1182.76]  Out: 2315 [Urine:2240; Stool:75]  GEN: Eyes closed resting both visits today.  Mother and father present.  HEENT: Normocephalic, atraumatic, alopecia, sclera anicteric, non-injected, mouth with some erythema and breakdown on buccal mucosa and lateral aspects of tongue, nares patent without discharge    CARD: Heart regular rate and rhythm. No murmurs, rubs or gallops.  Cap refill 2 seconds.  RESP: Lungs clear to auscultation. No increased work of breathing, crackles or wheezes.   ABD: Non-distended, non-tender, bowel sounds hyperactive, mild tenderness  EXTREM: No edema noted  SKIN: No rashes noted    Labs:  Labs reviewed, pertinent findings BMP with BUN 26, Cr 0.66. 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 He is currently BMT Transplant Date: BMT Txp 11/2/2018 (16 days).      Afebrile, white blood cell count increasing. Seems to be feeling better, pain management improved, c/o are more intermittent. MSD BMT, now with emerging WBC/ANC, waiting on count recovery requiring intermittent blood product transfusions and on  daily GCSF, self-limited epistaxis episodes now less frequent, at risk for GvHD on tacrolimus and MMF, nausea, h/o WPW with tight electrolyte control, h/o fever on empiric cefepime, C. Diff on PO vancomycin with declining stool output, and risk for opportunistic infections , perianal skin breakdown improved, mucositis on TPN/IL and morphine PCA with good pain management, at risk for VOD on prophylactic defibrotide and ursodiol. Anticipatory guidance provided on engraftment, GI and skin healing.      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  Conditioning per 2015-29: TBI days -4 through -1 followed by matched sibling donor transplant on 11/2   - Awaiting count recovery, WBC 0.6, ANC 0.5 today.      # Risk for GVHD: Post-transplant Cytoxan on days +3 and +4 complete   - Continue MMF   - Continue tacrolimus drip (goal 5-10). L 11/17 evel high  at 11.6 with dose decreased.  -11/18 Dose drawn late level pending     FEN/Renal:  # Risk for malnutrition: continue TPN/IL  - Dietician following, minimal intake       # Risk for electrolyte abnormalities:  - Check daily electrolytes. Given cardiac history (WPW), keep K>3.5, Mg>1.8, iCa nml.   - Mag and K sliding scale       # Risk for renal dysfunction and fluid overload:  - monitor I/O's and daily weights       # Risk for aHUS/TA-TMA:    - monitor LDH qMonday  - monitor urine protein/creatinine qTuesday      Pulmonary:  # Risk for pulmonary toxicity secondary to chemotherapy:   - monitor respiratory function per unit routine      # Rhinorrhea: 3 mos history, mild. Sinus CT (10/22) with findings of mucosal thickening without evidence of active infection.  RVP negative.      # Pneumomediastinum: finding upon work up Chest CT (10/22), asymptomatic. Per radiology, likely benign, perhaps transient.   - Patient instructed to notify care team with any SOB, dyspnea, or  remarkable cough  -SOB breath noted with ambulation after 75 ml of stool likely vasovagal - no reoccurrence of SOB today       Cardiovascular:  # Aaron-Parkinson-White Syndrome: diagnosed upon syncope in 02/2018. Asymptomatic, no interventions to date. Work up EKG c/w WPW, sinus bradycardia at 49 bpm. Work up ECHO normal with EF 68%.   Cardiology visit with Dr. Plummer (10/22): Holter revealed 10 PACs with no evidence of AV block. Normal Echocardiogram with EF 68%. Isael has hx of HR in high 30s with sleep.  - Avoid catecholinergics as able to avoid tachycardia  - Maintain normal iCa+, mag > 1.8, K > 3.5      # Risk for hypertension secondary to medications:   -Overnight softer blood pressures than his baseline, no kalina hypotension. Continue to monitor closely  - PRN hydralazine available      Heme:   # Pancytopenia secondary to chemotherapy: hx 1 platelet transfusion, well tolerated  - Transfuse for hemoglobin < 8, platelets < 30,000 (Defibrotide), no premedications  - GCSF until ANC >1500 x3 days      Infectious Disease:  # Risk for infection given immunocompromised status:   Active: Fever during conditioning, but afebrile now. Continue Cefepime through engraftment  Prophylaxis:                                                                                                                        - viral prophylaxis: CMV neg (donor CMV positive), HSV positive. Acyclovir  - fungal prophylaxis: high risk - Micafungin    - bacterial prophylaxis: Cefepime through engraftment     11/18 CMV PCR pending       # C.difficile colitis: Stool output improved. Continue oral vancomycin 4 x daily.       GI:   # Nausea management: Nausea without vomiting  - Scheduled medications: Kytril Q12H   - PRN medications: Ativan (also for anxiety), Benadryl, Marinol, Phenergan    - avoiding scop patch for now due to xerostomia      # Risk for VOD: especially high risk  previous Inotuzumab therapy.  Defibrotide ppx  - Ursodiol TID (10  mg/kg)    # Hemorrhoids: preparation H prn, has not complained of related discomfort in several days.    ENT:  # Epistaxis: occasional drips, nasal mucosa dry  - Ayr gel PRN  - Vaseline to nares      Neuro:  # Mucositis/pain:   - Continue morphine continuous infusion with PCA per patient, PCA dose last decreased  as not really helpful for barbie anal pruritis/pain.   - Isael reports generalized pain well controlled on current dosing.  - Integrative therapy     # Pruritis: Barbie anal area,  Continue scheduled IV benadryl 25 mg q8h as Isael feels this is most helpful. Discomfort improving Tucks and Witch hazel pad helpful as needed    Derm:  # Skin breakdown, perianal area: erythema surrounding anus, no open wounds noted, 2 areas of scab formation, no evidence of infection. Also erythematous and dry under scrotal sac. Consulted WOC RN , directed team to follow incontinence protocol. Education provided, treatment declined so far by Isael.  - Has medi honey, silvadene cream, menthol-zinc cream and tucks pads available if desired.       The above plan of care was developed by and communicated to me by the Pediatric BMT attending physician, Dr. Jojo Haider.      Lilia Prabhakar MSN, CPNP-  Pediatric Blood and Marrow Transplant Program  [facility] [phone]  Pager [phone]      Pediatric BMT Attending Inpatient Note:  Artemio was seen and evaluated by me today.     The significant interval history includes WBC 0.6 today, feeling better with less mucositis pain. Isael reluctant to get out of bed.     I have reviewed changes and data from the last 24 hours, including medications, laboratory results, vital signs and consultant recommendations.     I have formulated and discussed the plan with the BMT team.  The relevant clinical topics addressed included the followin19 y/o male with very high risk ALL admitted for MSD BMT, awaiting count recovery requiring intermittent blood product transfusions and on  daily GCSF, self-limited epistaxis episodes, at risk for GvHD on tacrolimus and MMF, nausea, h/o WPW with tight electrolyte control, h/o fever on empiric cefepime, C. Diff on PO vancomycin, and risk for opportunistic infections , perianal skin breakdown improved, mucositis on TPN/IL and morphine PCA (not yet ready for wean), at risk for VOD on prophylactic defibrotide and ursodiol. Anticipatory guidance provided on engraftment, GI and skin healing.    I discussed the course and plan with the patient/family and answered all of their questions to the best of my ability.    My care coordination activities today include oversight of planned lab studies, oversight of medication changes, discussion with BMT team-members and discussion with consultants.    My total floor time today was at least 40 minutes, greater than 50% of which was counseling and coordination of care.    Jojo Haider MD MPH  , Pediatric Blood and Marrow Transplantation  [facility] [phone]

## 2018-11-18 NOTE — PLAN OF CARE
Problem: Patient Care Overview  Goal: Plan of Care/Patient Progress Review  Outcome: No Change  Pt afebrile.  VSS and lung sounds are clear.  Pt morphine gtt continues per order.  Pt applied tuck pads to dominga-anal area with some relief.  Pt received prn ativan x1 for nausea.  Pt c/o SOB and lightheadedness after stooling and walking back to bed.  Pt educated to call nurse to go to bathroom with assistance at this time.  Pt parents at bedside, attentive to pt and involved in cares.  Hourly rounding completed.  Continue with POC.

## 2018-11-19 ENCOUNTER — HOSPITAL ENCOUNTER (OUTPATIENT)
Facility: CLINIC | Age: 18
End: 2018-11-19
Attending: NURSE PRACTITIONER
Payer: COMMERCIAL

## 2018-11-19 ENCOUNTER — APPOINTMENT (OUTPATIENT)
Dept: PHYSICAL THERAPY | Facility: CLINIC | Age: 18
End: 2018-11-19
Attending: PEDIATRICS
Payer: COMMERCIAL

## 2018-11-19 LAB
ALBUMIN SERPL-MCNC: 3 G/DL (ref 3.4–5)
ALP SERPL-CCNC: 103 U/L (ref 40–150)
ALT SERPL W P-5'-P-CCNC: 32 U/L (ref 0–70)
ANION GAP SERPL CALCULATED.3IONS-SCNC: 7 MMOL/L (ref 3–14)
ANISOCYTOSIS BLD QL SMEAR: SLIGHT
AST SERPL W P-5'-P-CCNC: 28 U/L (ref 0–45)
BASOPHILS # BLD AUTO: 0 10E9/L (ref 0–0.2)
BASOPHILS NFR BLD AUTO: 0 %
BILIRUB DIRECT SERPL-MCNC: 0.4 MG/DL (ref 0–0.2)
BILIRUB SERPL-MCNC: 0.7 MG/DL (ref 0.2–1.3)
BLD PROD TYP BPU: NORMAL
BUN SERPL-MCNC: 20 MG/DL (ref 7–30)
CA-I BLD-MCNC: 5.3 MG/DL (ref 4.4–5.2)
CALCIUM SERPL-MCNC: 8.9 MG/DL (ref 8.5–10.1)
CHLORIDE SERPL-SCNC: 104 MMOL/L (ref 94–109)
CMV DNA SPEC NAA+PROBE-ACNC: NORMAL [IU]/ML
CMV DNA SPEC NAA+PROBE-LOG#: NORMAL {LOG_IU}/ML
CO2 SERPL-SCNC: 26 MMOL/L (ref 20–32)
CREAT SERPL-MCNC: 0.63 MG/DL (ref 0.66–1.25)
DIFFERENTIAL METHOD BLD: ABNORMAL
EOSINOPHIL # BLD AUTO: 0 10E9/L (ref 0–0.7)
EOSINOPHIL NFR BLD AUTO: 0 %
ERYTHROCYTE [DISTWIDTH] IN BLOOD BY AUTOMATED COUNT: 12 % (ref 10–15)
FIBRINOGEN PPP-MCNC: 584 MG/DL (ref 200–420)
GFR SERPL CREATININE-BSD FRML MDRD: >90 ML/MIN/1.7M2
GLUCOSE SERPL-MCNC: 120 MG/DL (ref 70–99)
HCT VFR BLD AUTO: 22 % (ref 40–53)
HGB BLD-MCNC: 8.1 G/DL (ref 13.3–17.7)
INR PPP: 1.1 (ref 0.86–1.14)
LDH SERPL L TO P-CCNC: 193 U/L (ref 85–227)
LYMPHOCYTES # BLD AUTO: 0 10E9/L (ref 0.8–5.3)
LYMPHOCYTES NFR BLD AUTO: 2.7 %
Lab: ABNORMAL
MAGNESIUM SERPL-MCNC: 1.8 MG/DL (ref 1.6–2.3)
MCH RBC QN AUTO: 29.9 PG (ref 26.5–33)
MCHC RBC AUTO-ENTMCNC: 36.8 G/DL (ref 31.5–36.5)
MCV RBC AUTO: 81 FL (ref 78–100)
METAMYELOCYTES # BLD: 0 10E9/L
METAMYELOCYTES NFR BLD MANUAL: 0.9 %
MICROCYTES BLD QL SMEAR: PRESENT
MONOCYTES # BLD AUTO: 0.3 10E9/L (ref 0–1.3)
MONOCYTES NFR BLD AUTO: 21.8 %
MYELOCYTES # BLD: 0 10E9/L
MYELOCYTES NFR BLD MANUAL: 0.9 %
NEUTROPHILS # BLD AUTO: 0.9 10E9/L (ref 1.6–8.3)
NEUTROPHILS NFR BLD AUTO: 73.7 %
NUM BPU REQUESTED: 1
PHOSPHATE SERPL-MCNC: 5 MG/DL (ref 2.5–4.5)
PLATELET # BLD AUTO: 30 10E9/L (ref 150–450)
PLATELET # BLD EST: ABNORMAL 10*3/UL
POIKILOCYTOSIS BLD QL SMEAR: SLIGHT
POTASSIUM SERPL-SCNC: 4.6 MMOL/L (ref 3.4–5.3)
PREALB SERPL IA-MCNC: 20 MG/DL (ref 15–45)
PROT SERPL-MCNC: 6.7 G/DL (ref 6.8–8.8)
RBC # BLD AUTO: 2.71 10E12/L (ref 4.4–5.9)
SODIUM SERPL-SCNC: 137 MMOL/L (ref 133–144)
SPECIMEN SOURCE: ABNORMAL
SPECIMEN SOURCE: NORMAL
SPECIMEN SOURCE: NORMAL
TACROLIMUS BLD-MCNC: 6.2 UG/L (ref 5–15)
TACROLIMUS BLD-MCNC: 6.5 UG/L (ref 5–15)
TME LAST DOSE: NORMAL H
TME LAST DOSE: NORMAL H
TRIGL SERPL-MCNC: 97 MG/DL
WBC # BLD AUTO: 1.2 10E9/L (ref 4–11)
YEAST SPEC QL CULT: ABNORMAL
YEAST SPEC QL CULT: NORMAL

## 2018-11-19 PROCEDURE — 86923 COMPATIBILITY TEST ELECTRIC: CPT | Performed by: NURSE PRACTITIONER

## 2018-11-19 PROCEDURE — 25000128 H RX IP 250 OP 636: Performed by: PEDIATRICS

## 2018-11-19 PROCEDURE — 25000132 ZZH RX MED GY IP 250 OP 250 PS 637: Performed by: NURSE PRACTITIONER

## 2018-11-19 PROCEDURE — 84134 ASSAY OF PREALBUMIN: CPT | Performed by: NURSE PRACTITIONER

## 2018-11-19 PROCEDURE — 97110 THERAPEUTIC EXERCISES: CPT | Mod: GP

## 2018-11-19 PROCEDURE — 25000125 ZZHC RX 250: Performed by: PEDIATRICS

## 2018-11-19 PROCEDURE — 25000125 ZZHC RX 250: Performed by: NURSE PRACTITIONER

## 2018-11-19 PROCEDURE — 82248 BILIRUBIN DIRECT: CPT | Performed by: NURSE PRACTITIONER

## 2018-11-19 PROCEDURE — 80053 COMPREHEN METABOLIC PANEL: CPT | Performed by: NURSE PRACTITIONER

## 2018-11-19 PROCEDURE — 80197 ASSAY OF TACROLIMUS: CPT | Performed by: PEDIATRICS

## 2018-11-19 PROCEDURE — 84478 ASSAY OF TRIGLYCERIDES: CPT | Performed by: PEDIATRICS

## 2018-11-19 PROCEDURE — 82330 ASSAY OF CALCIUM: CPT | Performed by: PEDIATRICS

## 2018-11-19 PROCEDURE — 20000002 ZZH R&B BMT INTERMEDIATE

## 2018-11-19 PROCEDURE — 86850 RBC ANTIBODY SCREEN: CPT | Performed by: NURSE PRACTITIONER

## 2018-11-19 PROCEDURE — 85025 COMPLETE CBC W/AUTO DIFF WBC: CPT | Performed by: NURSE PRACTITIONER

## 2018-11-19 PROCEDURE — 25000132 ZZH RX MED GY IP 250 OP 250 PS 637: Performed by: PEDIATRICS

## 2018-11-19 PROCEDURE — 85610 PROTHROMBIN TIME: CPT | Performed by: NURSE PRACTITIONER

## 2018-11-19 PROCEDURE — 25000128 H RX IP 250 OP 636: Performed by: NURSE PRACTITIONER

## 2018-11-19 PROCEDURE — 85384 FIBRINOGEN ACTIVITY: CPT | Performed by: NURSE PRACTITIONER

## 2018-11-19 PROCEDURE — 83615 LACTATE (LD) (LDH) ENZYME: CPT | Performed by: PEDIATRICS

## 2018-11-19 PROCEDURE — 83735 ASSAY OF MAGNESIUM: CPT | Performed by: NURSE PRACTITIONER

## 2018-11-19 PROCEDURE — 86901 BLOOD TYPING SEROLOGIC RH(D): CPT | Performed by: NURSE PRACTITIONER

## 2018-11-19 PROCEDURE — C9399 UNCLASSIFIED DRUGS OR BIOLOG: HCPCS | Performed by: PEDIATRICS

## 2018-11-19 PROCEDURE — 84100 ASSAY OF PHOSPHORUS: CPT | Performed by: NURSE PRACTITIONER

## 2018-11-19 PROCEDURE — C9113 INJ PANTOPRAZOLE SODIUM, VIA: HCPCS | Performed by: PEDIATRICS

## 2018-11-19 PROCEDURE — 86900 BLOOD TYPING SEROLOGIC ABO: CPT | Performed by: NURSE PRACTITIONER

## 2018-11-19 PROCEDURE — 40000918 ZZH STATISTIC PT IP PEDS VISIT

## 2018-11-19 RX ORDER — VORICONAZOLE 200 MG/1
200 TABLET, FILM COATED ORAL EVERY 12 HOURS SCHEDULED
Status: DISCONTINUED | OUTPATIENT
Start: 2018-11-19 | End: 2018-11-19

## 2018-11-19 RX ADMIN — VANCOMYCIN HYDROCHLORIDE 125 MG: KIT at 20:30

## 2018-11-19 RX ADMIN — DIPHENHYDRAMINE HYDROCHLORIDE 25 MG: 50 INJECTION, SOLUTION INTRAMUSCULAR; INTRAVENOUS at 04:03

## 2018-11-19 RX ADMIN — TACROLIMUS 0.01 MG/KG/DAY: 5 INJECTION, SOLUTION INTRAVENOUS at 20:27

## 2018-11-19 RX ADMIN — GRANISETRON HYDROCHLORIDE 1 MG: 1 INJECTION INTRAVENOUS at 19:59

## 2018-11-19 RX ADMIN — DEXTRAN 70, AND HYPROMELLOSE 2910 1 DROP: 1; 3 SOLUTION/ DROPS OPHTHALMIC at 12:13

## 2018-11-19 RX ADMIN — MYCOPHENOLATE MOFETIL 960 MG: 500 INJECTION, POWDER, LYOPHILIZED, FOR SOLUTION INTRAVENOUS at 04:36

## 2018-11-19 RX ADMIN — VANCOMYCIN HYDROCHLORIDE 125 MG: KIT at 11:44

## 2018-11-19 RX ADMIN — DEFIBROTIDE SODIUM 400 MG: 80 INJECTION, SOLUTION INTRAVENOUS at 11:00

## 2018-11-19 RX ADMIN — DEFIBROTIDE SODIUM 400 MG: 80 INJECTION, SOLUTION INTRAVENOUS at 17:13

## 2018-11-19 RX ADMIN — MICAFUNGIN SODIUM 150 MG: 10 INJECTION, POWDER, LYOPHILIZED, FOR SOLUTION INTRAVENOUS at 09:48

## 2018-11-19 RX ADMIN — DEFIBROTIDE SODIUM 400 MG: 80 INJECTION, SOLUTION INTRAVENOUS at 06:41

## 2018-11-19 RX ADMIN — ACYCLOVIR SODIUM 650 MG: 50 INJECTION, SOLUTION INTRAVENOUS at 16:03

## 2018-11-19 RX ADMIN — MYCOPHENOLATE MOFETIL 960 MG: 500 INJECTION, POWDER, LYOPHILIZED, FOR SOLUTION INTRAVENOUS at 21:09

## 2018-11-19 RX ADMIN — VORICONAZOLE 300 MG: 200 TABLET, FILM COATED ORAL at 20:30

## 2018-11-19 RX ADMIN — DEFIBROTIDE SODIUM 400 MG: 80 INJECTION, SOLUTION INTRAVENOUS at 23:32

## 2018-11-19 RX ADMIN — Medication 1 ML: at 19:23

## 2018-11-19 RX ADMIN — Medication 2 G: at 19:59

## 2018-11-19 RX ADMIN — MYCOPHENOLATE MOFETIL 960 MG: 500 INJECTION, POWDER, LYOPHILIZED, FOR SOLUTION INTRAVENOUS at 13:30

## 2018-11-19 RX ADMIN — GRANISETRON HYDROCHLORIDE 1 MG: 1 INJECTION INTRAVENOUS at 08:43

## 2018-11-19 RX ADMIN — Medication 2 G: at 12:57

## 2018-11-19 RX ADMIN — URSODIOL 250 MG: 250 TABLET ORAL at 08:43

## 2018-11-19 RX ADMIN — ACYCLOVIR SODIUM 650 MG: 50 INJECTION, SOLUTION INTRAVENOUS at 08:31

## 2018-11-19 RX ADMIN — PANTOPRAZOLE SODIUM 40 MG: 40 INJECTION, POWDER, FOR SOLUTION INTRAVENOUS at 10:59

## 2018-11-19 RX ADMIN — ACYCLOVIR SODIUM 650 MG: 50 INJECTION, SOLUTION INTRAVENOUS at 23:33

## 2018-11-19 RX ADMIN — I.V. FAT EMULSION 240 ML: 20 EMULSION INTRAVENOUS at 19:57

## 2018-11-19 RX ADMIN — VANCOMYCIN HYDROCHLORIDE 125 MG: KIT at 16:03

## 2018-11-19 RX ADMIN — URSODIOL 250 MG: 250 TABLET ORAL at 20:30

## 2018-11-19 RX ADMIN — VANCOMYCIN HYDROCHLORIDE 125 MG: KIT at 08:30

## 2018-11-19 RX ADMIN — Medication 350 MCG: at 19:23

## 2018-11-19 RX ADMIN — PHYTONADIONE: 1 INJECTION, EMULSION INTRAMUSCULAR; INTRAVENOUS; SUBCUTANEOUS at 20:00

## 2018-11-19 RX ADMIN — URSODIOL 250 MG: 250 TABLET ORAL at 13:29

## 2018-11-19 RX ADMIN — LORAZEPAM 0.8 MG: 2 INJECTION INTRAMUSCULAR; INTRAVENOUS at 16:03

## 2018-11-19 RX ADMIN — Medication 2 G: at 04:03

## 2018-11-19 RX ADMIN — DIPHENHYDRAMINE HYDROCHLORIDE 25 MG: 50 INJECTION, SOLUTION INTRAMUSCULAR; INTRAVENOUS at 20:28

## 2018-11-19 RX ADMIN — DIPHENHYDRAMINE HYDROCHLORIDE 25 MG: 50 INJECTION, SOLUTION INTRAMUSCULAR; INTRAVENOUS at 11:44

## 2018-11-19 RX ADMIN — Medication 2 ML: at 19:59

## 2018-11-19 NOTE — PROGRESS NOTES
Pediatric BMT Daily Progress Note    Interval Events: Afebrile. Morphine GTT/PCA received 10 bumps overnight, continues to sleep majority of the day and overnight. Mood reported as improved with emerging counts.    Review of Systems: Pertinent positives include those mentioned in interval events. A complete review of systems was performed and is otherwise negative.      Medications:  Please see MAR    Physical Exam:  Temp:  [98.2  F (36.8  C)-99.1  F (37.3  C)] 98.4  F (36.9  C)  Pulse:  [75-81] 79  Resp:  [16-18] 16  BP: (77-97)/(40-60) 88/53  SpO2:  [95 %-97 %] 95 %  I/O last 3 completed shifts:  In: 2955.4 [I.V.:1323.4]  Out: 2525 [Urine:2525]  GEN: Awake and interactive. Mother present.  HEENT: Normocephalic, atraumatic, alopecia, sclera anicteric, non-injected, mouth with some erythema and breakdown on buccal mucosa and lateral aspects of tongue, nares patent without discharge    CARD: Heart regular rate and rhythm. No murmurs, rubs or gallops.Cap refill 2 seconds.  RESP: Lungs clear in upper lobes, diminished in bases, shallow respirations.  No increased work of breathing, crackles or wheezes.   ABD: Non-distended, non-tender, bowel sounds present  EXTREM: No edema noted  SKIN: No rashes noted    Labs:  Labs reviewed, pertinent findings BMP with BUN 20, Cr 0.63. 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 He is currently BMT Transplant Date: BMT Txp 11/2/2018 (17 days).      Afebrile, continues with emerging counts now with WBC 1.2 and ANC 0.9  Seems to be feeling better, pain management improved, c/o are more intermittent. Asking questions about steps to discharge and what outpatient care will look like once discharged.  MSD BMT,  requiring intermittent blood product transfusions and on daily GCSF, self-limited epistaxis episodes (none in last 48 hours), at risk for  GvHD on tacrolimus and MMF, nausea, h/o WPW with tight electrolyte control, h/o fever on empiric cefepime, C. Diff on PO vancomycin with stool output now normalized. Remains at risk for opportunistic infections , perianal skin breakdown improved, mucositis improving. Remains on TPN/IL cycled to 16 hours today and morphine GTT/PCA  Starting to wean today. At risk for VOD on prophylactic defibrotide and ursodiol. Anticipatory guidance provided on engraftment, GI and skin healing.      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  Conditioning per 2015-29: TBI days -4 through -1 followed by matched sibling donor transplant on 11/2   - Awaiting count recovery, WBC 1.2, ANC 0.9 today. Today is day 2 of 3 with ANC =/> 0.5      # Risk for GVHD: Post-transplant Cytoxan on days +3 and +4 complete   - Continue MMF   - Continue tacrolimus drip (goal 5-10). 11/17 elevated Tacro level  at 11.6 with dose decreased.  -11/18 Dose drawn late level did not result until 11/19 level 6.5 will adjust once today's level is resulted  -11/19 Tacro level pending      FEN/Renal:  # Risk for malnutrition: continue TPN/IL  - Dietician following, minimal intake       # Risk for electrolyte abnormalities:  - Check daily electrolytes. Given cardiac history (WPW), keep K>3.5, Mg>1.8, iCa nml.   - Mag and K sliding scale       # Risk for renal dysfunction and fluid overload:  - monitor I/O's and daily weights   -Weight is trending down, monitor closely with TPN cycling down      # Risk for aHUS/TA-TMA:    - monitor LDH qMonday-did not have weekly LDH ordered. Order placed   - monitor urine protein/creatinine qTuesday      Pulmonary:  # Risk for pulmonary toxicity secondary to chemotherapy:   - monitor respiratory function per unit routine      # Rhinorrhea: 3 mos history, mild. Sinus CT (10/22) with findings of mucosal thickening without  evidence of active infection.  RVP negative.      # Pneumomediastinum: finding upon work up Chest CT (10/22), asymptomatic. Per radiology, likely benign, perhaps transient.   - Patient instructed to notify care team with any SOB, dyspnea, or remarkable cough  -SOB breath noted with ambulation after 75 ml of stool likely vasovagal - no reoccurrence of SOB today  -SOB c/o again today describes as about 3 sec not persistent, self resolving.       Cardiovascular:  # Aaron-Parkinson-White Syndrome: diagnosed upon syncope in 02/2018. Asymptomatic, no interventions to date. Work up EKG c/w WPW, sinus bradycardia at 49 bpm. Work up ECHO normal with EF 68%.   Cardiology visit with Dr. Plummer (10/22): Holter revealed 10 PACs with no evidence of AV block. Normal Echocardiogram with EF 68%. Isael has hx of HR in high 30s with sleep.  - Avoid catecholinergics as able to avoid tachycardia  - Maintain normal iCa+, mag > 1.8, K > 3.5      # Risk for hypertension secondary to medications:   - PRN hydralazine available      Heme:   # Pancytopenia secondary to chemotherapy: hx 1 platelet transfusion, well tolerated  - Transfuse for hemoglobin < 8, platelets < 30,000 (Defibrotide), no premedications  - GCSF until ANC >1500 x3 days      Infectious Disease:  # Risk for infection given immunocompromised status:   Active: Fever during conditioning, but afebrile now. Continue Cefepime through engraftment  Prophylaxis:                                                                                                                        - viral prophylaxis: CMV neg (donor CMV positive), HSV positive. Acyclovir  - fungal prophylaxis: high risk - Micafungin    - bacterial prophylaxis: Cefepime through engraftment     11/18 CMV PCR pending       # C.difficile colitis: Stool output sigficantly decreased, will complete a 10 day course on 11/22. Oral vancomycin 4 x daily.       GI:   # Nausea management: Nausea without vomiting  - Scheduled  medications: Kytril Q12H   - PRN medications: Ativan (also for anxiety), Benadryl, Marinol, Phenergan    - avoiding scop patch for now due to xerostomia      # Risk for VOD: especially high risk  previous Inotuzumab therapy.  Defibrotide ppx  - Ursodiol TID (10 mg/kg)    # Hemorrhoids: preparation H prn, has not complained of related discomfort in several days.    ENT:  # Epistaxis: occasional drips, nasal mucosa dry  - Ayr gel PRN  - Vaseline to nares      Neuro:  # Mucositis/pain:   - Continue morphine continuous infusion with PCA per patient.   - 11/19 morphine gtt and pca decrease to Morphine 1.3mg/hr with 20min lock-out for PCA dose of 1.3mg for total of 3.9mg per hour.   - Isael reports generalized pain as well controlled and he is open to weaning morphine GTT/PCA  today.   - Integrative therapy     # Pruritis: Barbie anal area, decreased scheduled IV benadryl 25 mg from q8h to every q12h.  Discomfort improving Tucks with Witch hazel pad helpful as needed    Derm:  # Skin breakdown, perianal area: erythema surrounding anus, no open wounds noted, 2 areas of scab formation, no evidence of infection. Also erythematous and dry under scrotal sac. Consulted WOC RN 11/14, directed team to follow incontinence protocol. Education provided, treatment declined so far by Isael.  - Has medi honey, silvadene cream, menthol-zinc cream. Tucks pads have been effective for Isael    Social  - actively involved in creating an effective plan of care post discharge, parents are not  and communication appear challenging.   -Mother has concern of historical non-compliance with medicaiton      The above plan of care was developed by and communicated to me by the Pediatric BMT attending physician, Dr. Jojo Haider.      Lilia Prabhakar MSN, CPNP-  Pediatric Blood and Marrow Transplant Program  [facility] [phone]  Pager [phone]      Pediatric BMT Attending Inpatient Note:  Artemio was seen and evaluated by  me today.     The significant interval history includes engrafting, afebrile, and eager to discuss barriers to discharge. Patient requested morning meds be at a later time than 8 am. Patient excited; Mom anxious given her concern for patient compliance in outpatient setting.     I have reviewed changes and data from the last 24 hours, including medications, laboratory results, vital signs and consultant recommendations.     I have formulated and discussed the plan with the BMT team.  The relevant clinical topics addressed included the followin17 y/o male with very high risk ALL admitted for MSD BMT, awaiting count recovery requiring intermittent blood product transfusions and on daily GCSF, self-limited epistaxis episodes, at risk for GvHD on tacrolimus and MMF, nausea, h/o WPW with tight electrolyte control, h/o fever on empiric cefepime, C. Diff on PO vancomycin, and risk for opportunistic infections, perianal skin breakdown improved, mucositis on TPN/IL (cycling to 20 hrs) and morphine PCA (starting wean), at risk for VOD on prophylactic defibrotide and ursodiol. Anticipatory guidance provided on engraftment, GI and skin healing. Shifting medications to a slightly later schedule per patient preference. Transitioning IV meds to enteral.    I discussed the course and plan with the patient/family and answered all of their questions to the best of my ability.    My care coordination activities today include oversight of planned lab studies, oversight of medication changes, discussion with BMT team-members and discussion with consultants.    My total floor time today was at least 45 minutes, greater than 50% of which was counseling and coordination of care.    Jojo Haider MD MPH  , Pediatric Blood and Marrow Transplantation  [facility] [phone]

## 2018-11-19 NOTE — PROGRESS NOTES
Integrative Health Progress Note    Artemio Nino is a 18 year old male with a diagnosis of ALL.    BMT Transplant Date: BMT Txp 11/2/2018 (17 days)    Isael requested massage today for sore muscles post PT.    Assessment    Pain Location: Calves & Hamstrings    Pre Session Pain: Mild  Post Session Pain:  None    Pre Session Anxiety: None  Post Session Anxiety: None    Pre Session Nausea: None  Post Session Nausea: None    Post Session Observation: Calm/Relaxed    Intervention    Integrative Therapy(ies) Provided: Massage    Plan for Follow up    Isael requested that integrative does not check in daily. He will ask his nurse for follow-up if/when he is interested.     Tita Canseco DNP, RN  Pager [phone]    Time Spent: 25 min

## 2018-11-19 NOTE — PLAN OF CARE
Problem: Patient Care Overview  Goal: Plan of Care/Patient Progress Review  PT: Unit 4 - Patient reporting he has been laying in bed mostly - will get up to walk to the bathroom and shower. Educated patient on the importance of increasing activity - pt reporting he will start making laps on the unit once given privileges. Patient agreeable to participate in supine and seated edge of bed exercises for strengthening purposes. Low BP and reporting dizziness in standing. Will continue to follow during IP hospital stay.

## 2018-11-19 NOTE — PLAN OF CARE
Problem: Stem Cell/Bone Marrow Transplant (Adult)  Goal: Signs and Symptoms of Listed Potential Problems Will be Absent, Minimized or Managed (Stem Cell/Bone Marrow Transplant)  Signs and symptoms of listed potential problems will be absent, minimized or managed by discharge/transition of care (reference Stem Cell/Bone Marrow Transplant (Adult) CPG).   Outcome: Linda Ulloa has been afebrile, OVSS.  Lungs clear.  No complaints of pain or nausea, 10 bumps taken overnight.  Morphine and tacro drips remain unchanged.  Continues to use the tuck pads and states they are working.  No family at bedside this shift.  No stool overnight.  Hourly rounding completed.  Continue with POC.

## 2018-11-19 NOTE — PLAN OF CARE
Problem: Stem Cell/Bone Marrow Transplant (Adult)  Goal: Signs and Symptoms of Listed Potential Problems Will be Absent, Minimized or Managed (Stem Cell/Bone Marrow Transplant)  Signs and symptoms of listed potential problems will be absent, minimized or managed by discharge/transition of care (reference Stem Cell/Bone Marrow Transplant (Adult) CPG).   Outcome: Improving  Pt afebrile.  BP soft, OVSS and lung sounds are clear.  Pt took 5 bumps on day shift from his PCA.  He used tuck pads with some relief.  Pt denied nausea.  Pt took a shower this evening.  Pt slept/rested majority of shift.  Hourly rounding completed.  Continue with POC.

## 2018-11-19 NOTE — PLAN OF CARE
Problem: Patient Care Overview  Goal: Plan of Care/Patient Progress Review  Outcome: No Change  Tmax 99.2. HR in the 70s. BP 80s-100s/50s-60s. RR in the teens. Maintaining O2 sats on room air. Lungs clear, diminished in the bases. No complaints of pain; morphine PCA decreased this shift. Nausea x1; received PRN ativan with good relief. Voiding. No stool. Patient taking PO medications on time, without prompts today. Patient in bed this shift. Mother and father at bedside at various times throughout the day. Tacro gtt continues. Hourly rounding complete. Continue with plan of care.

## 2018-11-20 LAB
ABO + RH BLD: NORMAL
ABO + RH BLD: NORMAL
ALBUMIN UR-MCNC: 10 MG/DL
ANION GAP SERPL CALCULATED.3IONS-SCNC: 6 MMOL/L (ref 3–14)
ANISOCYTOSIS BLD QL SMEAR: SLIGHT
APPEARANCE UR: CLEAR
BASOPHILS # BLD AUTO: 0 10E9/L (ref 0–0.2)
BASOPHILS NFR BLD AUTO: 1 %
BILIRUB UR QL STRIP: NEGATIVE
BLD GP AB SCN SERPL QL: NORMAL
BLD PROD TYP BPU: NORMAL
BLD UNIT ID BPU: 0
BLD UNIT ID BPU: 0
BLOOD BANK CMNT PATIENT-IMP: NORMAL
BLOOD PRODUCT CODE: NORMAL
BLOOD PRODUCT CODE: NORMAL
BPU ID: NORMAL
BPU ID: NORMAL
BUN SERPL-MCNC: 19 MG/DL (ref 7–30)
CALCIUM SERPL-MCNC: 9 MG/DL (ref 8.5–10.1)
CHLORIDE SERPL-SCNC: 107 MMOL/L (ref 94–109)
CO2 SERPL-SCNC: 26 MMOL/L (ref 20–32)
COLOR UR AUTO: YELLOW
CREAT SERPL-MCNC: 0.65 MG/DL (ref 0.66–1.25)
CREAT UR-MCNC: 91 MG/DL
DIFFERENTIAL METHOD BLD: ABNORMAL
EOSINOPHIL # BLD AUTO: 0 10E9/L (ref 0–0.7)
EOSINOPHIL NFR BLD AUTO: 0 %
ERYTHROCYTE [DISTWIDTH] IN BLOOD BY AUTOMATED COUNT: 12 % (ref 10–15)
GFR SERPL CREATININE-BSD FRML MDRD: >90 ML/MIN/1.7M2
GLUCOSE SERPL-MCNC: 113 MG/DL (ref 70–99)
GLUCOSE UR STRIP-MCNC: NEGATIVE MG/DL
HCT VFR BLD AUTO: 21.6 % (ref 40–53)
HGB BLD-MCNC: 7.9 G/DL (ref 13.3–17.7)
HGB UR QL STRIP: NEGATIVE
HYALINE CASTS #/AREA URNS LPF: 2 /LPF (ref 0–2)
KETONES UR STRIP-MCNC: NEGATIVE MG/DL
LEUKOCYTE ESTERASE UR QL STRIP: NEGATIVE
LYMPHOCYTES # BLD AUTO: 0.1 10E9/L (ref 0.8–5.3)
LYMPHOCYTES NFR BLD AUTO: 2.9 %
MCH RBC QN AUTO: 29.7 PG (ref 26.5–33)
MCHC RBC AUTO-ENTMCNC: 36.6 G/DL (ref 31.5–36.5)
MCV RBC AUTO: 81 FL (ref 78–100)
METAMYELOCYTES # BLD: 0 10E9/L
METAMYELOCYTES NFR BLD MANUAL: 1 %
MICROCYTES BLD QL SMEAR: PRESENT
MONOCYTES # BLD AUTO: 0.4 10E9/L (ref 0–1.3)
MONOCYTES NFR BLD AUTO: 15.7 %
MUCOUS THREADS #/AREA URNS LPF: PRESENT /LPF
NEUTROPHILS # BLD AUTO: 2 10E9/L (ref 1.6–8.3)
NEUTROPHILS NFR BLD AUTO: 79.4 %
NITRATE UR QL: NEGATIVE
NUM BPU REQUESTED: 1
NUM BPU REQUESTED: 1
PH UR STRIP: 5.5 PH (ref 5–7)
PLATELET # BLD AUTO: 27 10E9/L (ref 150–450)
PLATELET # BLD EST: ABNORMAL 10*3/UL
POTASSIUM SERPL-SCNC: 4.2 MMOL/L (ref 3.4–5.3)
PROT UR-MCNC: 0.27 G/L
PROT/CREAT 24H UR: 0.3 G/G CR (ref 0–0.2)
RBC # BLD AUTO: 2.66 10E12/L (ref 4.4–5.9)
RBC #/AREA URNS AUTO: <1 /HPF (ref 0–2)
SODIUM SERPL-SCNC: 139 MMOL/L (ref 133–144)
SOURCE: ABNORMAL
SP GR UR STRIP: 1.02 (ref 1–1.03)
SPECIMEN EXP DATE BLD: NORMAL
TACROLIMUS BLD-MCNC: 6.3 UG/L (ref 5–15)
TME LAST DOSE: NORMAL H
TRANSFUSION STATUS PATIENT QL: NORMAL
UROBILINOGEN UR STRIP-MCNC: NORMAL MG/DL (ref 0–2)
WBC # BLD AUTO: 2.5 10E9/L (ref 4–11)
WBC #/AREA URNS AUTO: 2 /HPF (ref 0–5)

## 2018-11-20 PROCEDURE — 25000128 H RX IP 250 OP 636: Performed by: PEDIATRICS

## 2018-11-20 PROCEDURE — 87070 CULTURE OTHR SPECIMN AEROBIC: CPT | Performed by: NURSE PRACTITIONER

## 2018-11-20 PROCEDURE — 25000132 ZZH RX MED GY IP 250 OP 250 PS 637: Performed by: NURSE PRACTITIONER

## 2018-11-20 PROCEDURE — 25000125 ZZHC RX 250: Performed by: PEDIATRICS

## 2018-11-20 PROCEDURE — C9399 UNCLASSIFIED DRUGS OR BIOLOG: HCPCS | Performed by: PEDIATRICS

## 2018-11-20 PROCEDURE — 25000125 ZZHC RX 250: Performed by: NURSE PRACTITIONER

## 2018-11-20 PROCEDURE — C9113 INJ PANTOPRAZOLE SODIUM, VIA: HCPCS | Performed by: PEDIATRICS

## 2018-11-20 PROCEDURE — 25000128 H RX IP 250 OP 636: Performed by: NURSE PRACTITIONER

## 2018-11-20 PROCEDURE — 25000132 ZZH RX MED GY IP 250 OP 250 PS 637: Performed by: PEDIATRICS

## 2018-11-20 PROCEDURE — 20000002 ZZH R&B BMT INTERMEDIATE

## 2018-11-20 PROCEDURE — P9037 PLATE PHERES LEUKOREDU IRRAD: HCPCS | Performed by: PEDIATRICS

## 2018-11-20 PROCEDURE — 81001 URINALYSIS AUTO W/SCOPE: CPT | Performed by: NURSE PRACTITIONER

## 2018-11-20 PROCEDURE — 85025 COMPLETE CBC W/AUTO DIFF WBC: CPT | Performed by: NURSE PRACTITIONER

## 2018-11-20 PROCEDURE — 80048 BASIC METABOLIC PNL TOTAL CA: CPT | Performed by: NURSE PRACTITIONER

## 2018-11-20 PROCEDURE — P9040 RBC LEUKOREDUCED IRRADIATED: HCPCS | Performed by: NURSE PRACTITIONER

## 2018-11-20 PROCEDURE — 84156 ASSAY OF PROTEIN URINE: CPT | Performed by: NURSE PRACTITIONER

## 2018-11-20 PROCEDURE — 25000131 ZZH RX MED GY IP 250 OP 636 PS 637: Performed by: NURSE PRACTITIONER

## 2018-11-20 PROCEDURE — 80197 ASSAY OF TACROLIMUS: CPT | Performed by: PEDIATRICS

## 2018-11-20 RX ORDER — PANTOPRAZOLE SODIUM 40 MG/1
40 TABLET, DELAYED RELEASE ORAL 2 TIMES DAILY
Status: DISCONTINUED | OUTPATIENT
Start: 2018-11-20 | End: 2018-11-27 | Stop reason: HOSPADM

## 2018-11-20 RX ORDER — TACROLIMUS 1 MG/1
1 CAPSULE ORAL
Status: DISCONTINUED | OUTPATIENT
Start: 2018-11-20 | End: 2018-11-24

## 2018-11-20 RX ORDER — URSODIOL 250 MG/1
250 TABLET, FILM COATED ORAL 3 TIMES DAILY
Status: DISCONTINUED | OUTPATIENT
Start: 2018-11-20 | End: 2018-11-25

## 2018-11-20 RX ORDER — DIPHENHYDRAMINE HYDROCHLORIDE 50 MG/ML
25 INJECTION INTRAMUSCULAR; INTRAVENOUS EVERY 12 HOURS
Status: DISCONTINUED | OUTPATIENT
Start: 2018-11-20 | End: 2018-11-21

## 2018-11-20 RX ADMIN — VANCOMYCIN HYDROCHLORIDE 125 MG: KIT at 07:50

## 2018-11-20 RX ADMIN — DEFIBROTIDE SODIUM 400 MG: 80 INJECTION, SOLUTION INTRAVENOUS at 11:51

## 2018-11-20 RX ADMIN — GRANISETRON HYDROCHLORIDE 1 MG: 1 INJECTION INTRAVENOUS at 09:04

## 2018-11-20 RX ADMIN — DEFIBROTIDE SODIUM 400 MG: 80 INJECTION, SOLUTION INTRAVENOUS at 06:57

## 2018-11-20 RX ADMIN — DIPHENHYDRAMINE HYDROCHLORIDE 25 MG: 50 INJECTION, SOLUTION INTRAMUSCULAR; INTRAVENOUS at 04:11

## 2018-11-20 RX ADMIN — GRANISETRON HYDROCHLORIDE 1 MG: 1 INJECTION INTRAVENOUS at 20:05

## 2018-11-20 RX ADMIN — LORAZEPAM 0.8 MG: 2 INJECTION INTRAMUSCULAR; INTRAVENOUS at 14:34

## 2018-11-20 RX ADMIN — URSODIOL 250 MG: 250 TABLET ORAL at 16:00

## 2018-11-20 RX ADMIN — PANTOPRAZOLE SODIUM 40 MG: 40 INJECTION, POWDER, FOR SOLUTION INTRAVENOUS at 07:49

## 2018-11-20 RX ADMIN — MYCOPHENOLATE MOFETIL 960 MG: 500 INJECTION, POWDER, LYOPHILIZED, FOR SOLUTION INTRAVENOUS at 22:03

## 2018-11-20 RX ADMIN — VORICONAZOLE 300 MG: 200 TABLET, FILM COATED ORAL at 09:14

## 2018-11-20 RX ADMIN — ACYCLOVIR SODIUM 650 MG: 50 INJECTION, SOLUTION INTRAVENOUS at 16:00

## 2018-11-20 RX ADMIN — VANCOMYCIN HYDROCHLORIDE 125 MG: KIT at 13:58

## 2018-11-20 RX ADMIN — PHYTONADIONE: 1 INJECTION, EMULSION INTRAMUSCULAR; INTRAVENOUS; SUBCUTANEOUS at 19:58

## 2018-11-20 RX ADMIN — URSODIOL 250 MG: 250 TABLET ORAL at 22:02

## 2018-11-20 RX ADMIN — I.V. FAT EMULSION 240 ML: 20 EMULSION INTRAVENOUS at 20:01

## 2018-11-20 RX ADMIN — URSODIOL 250 MG: 250 TABLET ORAL at 07:49

## 2018-11-20 RX ADMIN — Medication 0.5 ML: at 20:06

## 2018-11-20 RX ADMIN — TACROLIMUS 1 MG: 1 CAPSULE ORAL at 22:02

## 2018-11-20 RX ADMIN — MYCOPHENOLATE MOFETIL 960 MG: 500 INJECTION, POWDER, LYOPHILIZED, FOR SOLUTION INTRAVENOUS at 04:43

## 2018-11-20 RX ADMIN — VANCOMYCIN HYDROCHLORIDE 125 MG: KIT at 22:02

## 2018-11-20 RX ADMIN — PANTOPRAZOLE SODIUM 40 MG: 40 TABLET, DELAYED RELEASE ORAL at 22:02

## 2018-11-20 RX ADMIN — MYCOPHENOLATE MOFETIL 960 MG: 500 INJECTION, POWDER, LYOPHILIZED, FOR SOLUTION INTRAVENOUS at 13:54

## 2018-11-20 RX ADMIN — ACYCLOVIR SODIUM 650 MG: 50 INJECTION, SOLUTION INTRAVENOUS at 06:57

## 2018-11-20 RX ADMIN — Medication 2 G: at 04:12

## 2018-11-20 RX ADMIN — Medication 0.5 ML: at 20:09

## 2018-11-20 RX ADMIN — LORAZEPAM 0.8 MG: 2 INJECTION INTRAMUSCULAR; INTRAVENOUS at 21:59

## 2018-11-20 RX ADMIN — DEFIBROTIDE SODIUM 400 MG: 80 INJECTION, SOLUTION INTRAVENOUS at 17:53

## 2018-11-20 RX ADMIN — VANCOMYCIN HYDROCHLORIDE 125 MG: KIT at 16:00

## 2018-11-20 RX ADMIN — Medication 350 MCG: at 20:06

## 2018-11-20 RX ADMIN — DIPHENHYDRAMINE HYDROCHLORIDE 25 MG: 50 INJECTION, SOLUTION INTRAMUSCULAR; INTRAVENOUS at 16:00

## 2018-11-20 NOTE — PROGRESS NOTES
Per discussion with BMT team, WOC service no longer indicated for follow up on anal fissures which are now resolving, Will sign off

## 2018-11-20 NOTE — PLAN OF CARE
Problem: Patient Care Overview  Goal: Plan of Care/Patient Progress Review  Afebrile. Other VS Stable. LS clear. Pain managed with morphine PCA, 1 bump taken today. Emesis x1, given prn ativan. Platelets infused, tolerated well. Voiding normally. No stool today. Slept most of the shift. Mom and dad at bedside at times. Hourly rounding completed.     -Pt needed encouragement with PO medications today. Reminded this am of needing to take them as he had not taken 0800 meds at 1100; reiterated importance of them being on time and that this will be the expectation at home as well. Pt agreeable to taking them after this conversation.

## 2018-11-20 NOTE — PLAN OF CARE
Problem: Stem Cell/Bone Marrow Transplant (Adult)  Goal: Signs and Symptoms of Listed Potential Problems Will be Absent, Minimized or Managed (Stem Cell/Bone Marrow Transplant)  Signs and symptoms of listed potential problems will be absent, minimized or managed by discharge/transition of care (reference Stem Cell/Bone Marrow Transplant (Adult) CPG).   Outcome: No Change  Afebrile. VSS. Lungs clear on RA. Received RBCs. Platelets to be given on day shift. No nausea/vomitting. Pain controlled wtih PCA bumps x1, none denied. Slept between cares. Hourly rounding complete. Continue with POC.

## 2018-11-20 NOTE — PROGRESS NOTES
Pediatric BMT Daily Progress Note    Interval Events: Afebrile. Officially engrafted. Good pain control and did better taking meds last evening, but still having issues this AM.  Review of Systems: Pertinent positives include those mentioned in interval events. A complete review of systems was performed and is otherwise negative.      Medications:  Please see MAR    Physical Exam:  Temp:  [97.9  F (36.6  C)-99.2  F (37.3  C)] 98.6  F (37  C)  Pulse:  [77] 77  Heart Rate:  [67-85] 74  Resp:  [16-18] 16  BP: ()/(41-61) 91/53  SpO2:  [96 %-100 %] 96 %  I/O last 3 completed shifts:  In: 2892.23 [I.V.:1222.23]  Out: 3570 [Urine:3570]  GEN: Awake but trying to go back to sleep again. Family was in room prior, but not there on secondary assessment. NAD  HEENT: Normocephalic, atraumatic, alopecia, will not open eyes, MMM, lesions healing, nares patent without discharge    CARD: Heart regular rate and rhythm. No murmurs, rubs or gallops.Cap refill 2 seconds.  RESP: Lungs clear in upper lobes, diminished in bases, shallow respirations.  No increased work of breathing, crackles or wheezes.   ABD: Non-distended, non-tender, bowel sounds present  EXTREM: No edema noted  SKIN: No rashes noted    Labs:  Labs reviewed, pertinent findings BMP with BUN 19, Cr 0.65. 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 He is currently BMT Transplant Date: BMT Txp 11/2/2018 (18 days).      Afebrile, and clinically stable. Continues to intermittently struggle with medications. Engrafted.       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  Conditioning per 2015-29: TBI days -4 through -1 followed by matched sibling donor transplant on 11/2   - Engrafted (day +15), WBC 2.5, ANC 2.0 today. Today  is day 3 of 3 with ANC =/> 0.5. May go in hallway      # Risk for GVHD: Post-transplant Cytoxan on days +3 and +4 complete   - Continue MMF   - Continue tacrolimus drip (goal 5-10). 11/19: 6.2. Level pending today and will convert to oral for evening dose    FEN/Renal:  # Risk for malnutrition: continue TPN/IL, cycled to 16 hours today  - Dietician following, minimal intake       # Risk for electrolyte abnormalities:  - Check daily electrolytes. Given cardiac history (WPW), keep K>3.5, Mg>1.8, iCa nml.   - Mag and K sliding scale       # Risk for renal dysfunction and fluid overload:  - monitor I/O's and daily weights   -Weight improved today      # Risk for aHUS/TA-TMA:    - monitor LDH qMonday/Thursday post-BMT until outpatient then weekly through day +100  - monitor urine protein/creatinine qTuesday through day +100      Pulmonary:  # Risk for pulmonary toxicity secondary to chemotherapy:   - monitor respiratory function per unit routine      # Rhinorrhea: 3 mos history, mild. Sinus CT (10/22) with findings of mucosal thickening without evidence of active infection.  RVP negative.      # Pneumomediastinum: finding upon work up Chest CT (10/22), asymptomatic. Per radiology, likely benign, perhaps transient.   - Patient instructed to notify care team with any SOB, dyspnea, or remarkable cough  - none over last 24 hours      Cardiovascular:  # Aaron-Parkinson-White Syndrome: diagnosed upon syncope in 02/2018. Asymptomatic, no interventions to date. Work up EKG c/w WPW, sinus bradycardia at 49 bpm. Work up ECHO normal with EF 68%.   Cardiology visit with Dr. Plummer (10/22): Holter revealed 10 PACs with no evidence of AV block. Normal Echocardiogram with EF 68%. Isael has hx of HR in high 30s with sleep.  - Avoid catecholinergics as able to avoid tachycardia  - Maintain normal iCa+, mag > 1.8, K > 3.5      # Risk for hypertension secondary to medications:   - PRN hydralazine available      Heme:   # Pancytopenia  secondary to chemotherapy: hx 1 platelet transfusion, well tolerated  - Transfuse for hemoglobin < 8, platelets < 30,000 (Defibrotide), no premedications  - GCSF until ANC >1500 x3 days      Infectious Disease:  # Risk for infection given immunocompromised status:   Active: no current concerns  - stopped cefepime now that engrafted  Prophylaxis:                                                                                                                        - viral prophylaxis: CMV neg (donor CMV positive), HSV positive. Acyclovir  - fungal prophylaxis: high risk - Voriconazole will need level 11/26 (ordered)  - bacterial prophylaxis: Will start Bactrim at day +28 if counts appropriate        # C.difficile colitis: Stool output sigficantly decreased, will complete a 10 day course on 11/22. Oral vancomycin 4 x daily.       GI:   # Nausea management: Nausea without vomiting  - Scheduled medications: Kytril Q12H   - PRN medications: Ativan (also for anxiety), Marinol, Phenergan    - avoiding scop patch for now due to xerostomia      # Risk for VOD: especially high risk  previous Inotuzumab therapy.  Defibrotide ppx  - Ursodiol TID (10 mg/kg)    # Hemorrhoids: preparation H prn, has not complained of related discomfort in several days.    ENT:  # Epistaxis: occasional drips, nasal mucosa dry  - Ayr gel PRN  - Vaseline to nares      Neuro:  # Mucositis/pain:   - continue to wean morphine as able  - Isael reports generalized pain as well controlled and he is open to continue weaning  - Integrative therapy     # Pruritis: Barbie anal area, decreased scheduled IV benadryl 25 mg from q8h to every q12h.  Discomfort improving Tucks with Witch hazel pad helpful as needed    Derm:  # Skin breakdown, perianal area: history of erythema surrounding anus, no open wounds noted, 2 areas of scab formation, no evidence of infection. Also erythematous and dry under scrotal sac. Consulted WOC RN 11/14 (no longer needed as he is  recovering).  - Has medi honey, silvadene cream, menthol-zinc cream. Tucks pads have been effective for Isael    Social  - actively involved in creating an effective plan of care post discharge, parents are not  and communication appear challenging.   -Mother has concern of historical non-compliance with medication and cares      The above plan of care was developed by and communicated to me by the Pediatric BMT attending physician, Dr. Jojo Haider.      Shannon J. Schroetter, CPNP-  Pediatric Blood and Marrow Transplant Program  [facility]  Pager: [phone]  [facility] Phone: [phone]      Pediatric BMT Attending Inpatient Note:  Artemio was seen and evaluated by me today.     The significant interval history includes achieved neutrophil engraftment, afebrile, tolerating morphine taper and transition of some meds to oral. Not interested in an NG tube. Excited to leave the room with a mask on.     I have reviewed changes and data from the last 24 hours, including medications, laboratory results, vital signs and consultant recommendations.     I have formulated and discussed the plan with the BMT team.  The relevant clinical topics addressed included the followin19 y/o male with very high risk ALL admitted for MSD BMT, neutrophil engrafted on day +16, requiring intermittent blood product transfusions and on daily GCSF, self-limited epistaxis episodes, at risk for GvHD on tacrolimus and MMF, nausea resolved (tapering benadryl), h/o WPW with tight electrolyte control, afebrile, discontinuing empiric cefepime, C. Diff on PO vancomycin, and risk for opportunistic infections, perianal skin breakdown improved, mucositis on TPN/IL (cycling to 16 hrs) and morphine PCA (weaning), at risk for VOD on prophylactic defibrotide and ursodiol. Anticipatory guidance provided on engraftment, GI and skin healing. Shifting medications to a  slightly later schedule per patient preference. Transitioning IV meds to enteral (protonix and tacro today).    I discussed the course and plan with the patient/family and answered all of their questions to the best of my ability.    My care coordination activities today include oversight of planned lab studies, oversight of medication changes, discussion with BMT team-members and discussion with consultants.    My total floor time today was at least 40 minutes, greater than 50% of which was counseling and coordination of care.    Jojo Haider MD MPH  , Pediatric Blood and Marrow Transplantation  [facility] [phone]

## 2018-11-21 LAB
ANION GAP SERPL CALCULATED.3IONS-SCNC: 7 MMOL/L (ref 3–14)
ANISOCYTOSIS BLD QL SMEAR: SLIGHT
BASOPHILS # BLD AUTO: 0 10E9/L (ref 0–0.2)
BASOPHILS NFR BLD AUTO: 0 %
BUN SERPL-MCNC: 18 MG/DL (ref 7–30)
CA-I BLD-MCNC: 4.5 MG/DL (ref 4.4–5.2)
CALCIUM SERPL-MCNC: 9 MG/DL (ref 8.5–10.1)
CHLORIDE SERPL-SCNC: 105 MMOL/L (ref 94–109)
CO2 SERPL-SCNC: 27 MMOL/L (ref 20–32)
CREAT SERPL-MCNC: 0.6 MG/DL (ref 0.66–1.25)
DIFFERENTIAL METHOD BLD: ABNORMAL
EOSINOPHIL # BLD AUTO: 0 10E9/L (ref 0–0.7)
EOSINOPHIL NFR BLD AUTO: 0 %
ERYTHROCYTE [DISTWIDTH] IN BLOOD BY AUTOMATED COUNT: 12.4 % (ref 10–15)
GFR SERPL CREATININE-BSD FRML MDRD: >90 ML/MIN/1.7M2
GLUCOSE SERPL-MCNC: 110 MG/DL (ref 70–99)
HCT VFR BLD AUTO: 24.6 % (ref 40–53)
HGB BLD-MCNC: 9.3 G/DL (ref 13.3–17.7)
LYMPHOCYTES # BLD AUTO: 0.1 10E9/L (ref 0.8–5.3)
LYMPHOCYTES NFR BLD AUTO: 0.9 %
MAGNESIUM SERPL-MCNC: 1.9 MG/DL (ref 1.6–2.3)
MCH RBC QN AUTO: 30.2 PG (ref 26.5–33)
MCHC RBC AUTO-ENTMCNC: 37.8 G/DL (ref 31.5–36.5)
MCV RBC AUTO: 80 FL (ref 78–100)
METAMYELOCYTES # BLD: 0.1 10E9/L
METAMYELOCYTES NFR BLD MANUAL: 0.9 %
MICROCYTES BLD QL SMEAR: PRESENT
MONOCYTES # BLD AUTO: 1.2 10E9/L (ref 0–1.3)
MONOCYTES NFR BLD AUTO: 20.7 %
NEUTROPHILS # BLD AUTO: 4.3 10E9/L (ref 1.6–8.3)
NEUTROPHILS NFR BLD AUTO: 77.5 %
PHOSPHATE SERPL-MCNC: 3.7 MG/DL (ref 2.5–4.5)
PLATELET # BLD AUTO: 49 10E9/L (ref 150–450)
PLATELET # BLD EST: ABNORMAL 10*3/UL
POTASSIUM SERPL-SCNC: 3.7 MMOL/L (ref 3.4–5.3)
RBC # BLD AUTO: 3.08 10E12/L (ref 4.4–5.9)
SODIUM SERPL-SCNC: 139 MMOL/L (ref 133–144)
TACROLIMUS BLD-MCNC: 6.3 UG/L (ref 5–15)
TME LAST DOSE: NORMAL H
WBC # BLD AUTO: 5.6 10E9/L (ref 4–11)

## 2018-11-21 PROCEDURE — 25000128 H RX IP 250 OP 636: Performed by: NURSE PRACTITIONER

## 2018-11-21 PROCEDURE — 82330 ASSAY OF CALCIUM: CPT | Performed by: PEDIATRICS

## 2018-11-21 PROCEDURE — 25000131 ZZH RX MED GY IP 250 OP 636 PS 637: Performed by: NURSE PRACTITIONER

## 2018-11-21 PROCEDURE — 25000125 ZZHC RX 250: Performed by: PEDIATRICS

## 2018-11-21 PROCEDURE — 25000132 ZZH RX MED GY IP 250 OP 250 PS 637: Performed by: PEDIATRICS

## 2018-11-21 PROCEDURE — 25800025 ZZH RX 258: Performed by: PEDIATRICS

## 2018-11-21 PROCEDURE — 25000128 H RX IP 250 OP 636: Performed by: PEDIATRICS

## 2018-11-21 PROCEDURE — 80048 BASIC METABOLIC PNL TOTAL CA: CPT | Performed by: NURSE PRACTITIONER

## 2018-11-21 PROCEDURE — C9399 UNCLASSIFIED DRUGS OR BIOLOG: HCPCS | Performed by: NURSE PRACTITIONER

## 2018-11-21 PROCEDURE — 84100 ASSAY OF PHOSPHORUS: CPT | Performed by: NURSE PRACTITIONER

## 2018-11-21 PROCEDURE — 25000132 ZZH RX MED GY IP 250 OP 250 PS 637: Performed by: NURSE PRACTITIONER

## 2018-11-21 PROCEDURE — 83735 ASSAY OF MAGNESIUM: CPT | Performed by: NURSE PRACTITIONER

## 2018-11-21 PROCEDURE — C9399 UNCLASSIFIED DRUGS OR BIOLOG: HCPCS | Performed by: PEDIATRICS

## 2018-11-21 PROCEDURE — 20000002 ZZH R&B BMT INTERMEDIATE

## 2018-11-21 PROCEDURE — 85025 COMPLETE CBC W/AUTO DIFF WBC: CPT | Performed by: NURSE PRACTITIONER

## 2018-11-21 PROCEDURE — 80197 ASSAY OF TACROLIMUS: CPT | Performed by: NURSE PRACTITIONER

## 2018-11-21 PROCEDURE — 25000125 ZZHC RX 250: Performed by: NURSE PRACTITIONER

## 2018-11-21 RX ORDER — GRANISETRON HYDROCHLORIDE 1 MG/1
1 TABLET, FILM COATED ORAL EVERY 12 HOURS SCHEDULED
Status: DISCONTINUED | OUTPATIENT
Start: 2018-11-21 | End: 2018-11-27 | Stop reason: HOSPADM

## 2018-11-21 RX ORDER — DIPHENHYDRAMINE HYDROCHLORIDE 50 MG/ML
25 INJECTION INTRAMUSCULAR; INTRAVENOUS EVERY 6 HOURS PRN
Status: DISCONTINUED | OUTPATIENT
Start: 2018-11-21 | End: 2018-11-27 | Stop reason: HOSPADM

## 2018-11-21 RX ORDER — VALACYCLOVIR HYDROCHLORIDE 500 MG/1
1000 TABLET, FILM COATED ORAL 3 TIMES DAILY
Status: DISCONTINUED | OUTPATIENT
Start: 2018-11-21 | End: 2018-11-27 | Stop reason: HOSPADM

## 2018-11-21 RX ADMIN — MORPHINE SULFATE: 5 INJECTION, SOLUTION INTRAVENOUS at 00:27

## 2018-11-21 RX ADMIN — URSODIOL 250 MG: 250 TABLET ORAL at 09:47

## 2018-11-21 RX ADMIN — DIPHENHYDRAMINE HYDROCHLORIDE 25 MG: 50 INJECTION, SOLUTION INTRAMUSCULAR; INTRAVENOUS at 03:40

## 2018-11-21 RX ADMIN — VORICONAZOLE 300 MG: 200 TABLET, FILM COATED ORAL at 09:47

## 2018-11-21 RX ADMIN — DEFIBROTIDE SODIUM 400 MG: 80 INJECTION, SOLUTION INTRAVENOUS at 00:45

## 2018-11-21 RX ADMIN — PANTOPRAZOLE SODIUM 40 MG: 40 TABLET, DELAYED RELEASE ORAL at 22:00

## 2018-11-21 RX ADMIN — URSODIOL 250 MG: 250 TABLET ORAL at 16:04

## 2018-11-21 RX ADMIN — VANCOMYCIN HYDROCHLORIDE 125 MG: KIT at 16:04

## 2018-11-21 RX ADMIN — MYCOPHENOLATE MOFETIL 960 MG: 500 INJECTION, POWDER, LYOPHILIZED, FOR SOLUTION INTRAVENOUS at 04:44

## 2018-11-21 RX ADMIN — GRANISETRON HYDROCHLORIDE 1 MG: 1 TABLET, FILM COATED ORAL at 22:00

## 2018-11-21 RX ADMIN — Medication 350 MCG: at 20:46

## 2018-11-21 RX ADMIN — VANCOMYCIN HYDROCHLORIDE 125 MG: KIT at 09:49

## 2018-11-21 RX ADMIN — ACYCLOVIR SODIUM 650 MG: 50 INJECTION, SOLUTION INTRAVENOUS at 09:12

## 2018-11-21 RX ADMIN — GRANISETRON HYDROCHLORIDE 1 MG: 1 INJECTION INTRAVENOUS at 09:12

## 2018-11-21 RX ADMIN — ACYCLOVIR SODIUM 650 MG: 50 INJECTION, SOLUTION INTRAVENOUS at 00:45

## 2018-11-21 RX ADMIN — DEFIBROTIDE SODIUM 400 MG: 80 INJECTION, SOLUTION INTRAVENOUS at 17:51

## 2018-11-21 RX ADMIN — VANCOMYCIN HYDROCHLORIDE 125 MG: KIT at 14:05

## 2018-11-21 RX ADMIN — MYCOPHENOLATE MOFETIL 960 MG: 500 INJECTION, POWDER, LYOPHILIZED, FOR SOLUTION INTRAVENOUS at 22:00

## 2018-11-21 RX ADMIN — LORAZEPAM 0.8 MG: 2 INJECTION INTRAMUSCULAR; INTRAVENOUS at 16:50

## 2018-11-21 RX ADMIN — TACROLIMUS 1 MG: 1 CAPSULE ORAL at 22:00

## 2018-11-21 RX ADMIN — Medication 0.5 ML: at 20:46

## 2018-11-21 RX ADMIN — URSODIOL 250 MG: 250 TABLET ORAL at 21:59

## 2018-11-21 RX ADMIN — Medication 0.5 ML: at 20:49

## 2018-11-21 RX ADMIN — TACROLIMUS 1 MG: 1 CAPSULE ORAL at 09:47

## 2018-11-21 RX ADMIN — PANTOPRAZOLE SODIUM 40 MG: 40 TABLET, DELAYED RELEASE ORAL at 09:46

## 2018-11-21 RX ADMIN — MORPHINE SULFATE: 5 INJECTION, SOLUTION INTRAVENOUS at 13:39

## 2018-11-21 RX ADMIN — MYCOPHENOLATE MOFETIL 960 MG: 500 INJECTION, POWDER, LYOPHILIZED, FOR SOLUTION INTRAVENOUS at 14:06

## 2018-11-21 RX ADMIN — LORAZEPAM 0.8 MG: 2 INJECTION INTRAMUSCULAR; INTRAVENOUS at 20:24

## 2018-11-21 RX ADMIN — VALACYCLOVIR HYDROCHLORIDE 1000 MG: 500 TABLET, FILM COATED ORAL at 16:04

## 2018-11-21 RX ADMIN — LORAZEPAM 0.8 MG: 2 INJECTION INTRAMUSCULAR; INTRAVENOUS at 11:37

## 2018-11-21 RX ADMIN — DEFIBROTIDE SODIUM 400 MG: 80 INJECTION, SOLUTION INTRAVENOUS at 07:00

## 2018-11-21 RX ADMIN — PHYTONADIONE: 1 INJECTION, EMULSION INTRAMUSCULAR; INTRAVENOUS; SUBCUTANEOUS at 19:36

## 2018-11-21 RX ADMIN — LORAZEPAM 0.8 MG: 2 INJECTION INTRAMUSCULAR; INTRAVENOUS at 22:28

## 2018-11-21 RX ADMIN — VORICONAZOLE 300 MG: 200 TABLET, FILM COATED ORAL at 21:59

## 2018-11-21 RX ADMIN — VANCOMYCIN HYDROCHLORIDE 125 MG: KIT at 21:59

## 2018-11-21 RX ADMIN — VALACYCLOVIR HYDROCHLORIDE 1000 MG: 500 TABLET, FILM COATED ORAL at 21:59

## 2018-11-21 RX ADMIN — DEFIBROTIDE SODIUM 400 MG: 80 INJECTION, SOLUTION INTRAVENOUS at 11:07

## 2018-11-21 RX ADMIN — I.V. FAT EMULSION 240 ML: 20 EMULSION INTRAVENOUS at 19:35

## 2018-11-21 NOTE — PROGRESS NOTES
Pediatric BMT Daily Progress Note    Interval Events: Remains afebrile and reports controlled pain, nausea, and overall feeling better. Do well with evening/night meds, still needs lots of prompting for AM medications, but once takes them, can keep them down without issue.  Review of Systems: Pertinent positives include those mentioned in interval events. A complete review of systems was performed and is otherwise negative.      Medications:  Please see MAR    Physical Exam:  Temp:  [97.9  F (36.6  C)-99.4  F (37.4  C)] 98.4  F (36.9  C)  Pulse:  [68-94] 94  Heart Rate:  [85] 85  Resp:  [16-20] 18  BP: ()/(50-64) 93/52  SpO2:  [96 %-99 %] 98 %  I/O last 3 completed shifts:  In: 3343.03 [I.V.:1179.03]  Out: 2920 [Urine:2920]  GEN: Awake and alert, cooperative with assessment and interactive with providers. NAD  HEENT: Normocephalic, atraumatic, alopecia, sclera clear, MMM, lesions healing, nares patent without discharge    CARD: Heart regular rate and rhythm. No murmurs, rubs or gallops.Cap refill 2 seconds.  RESP: Lungs clear in upper lobes, diminished in bases, shallow respirations.  No increased work of breathing, crackles or wheezes.   ABD: Non-distended, non-tender, bowel sounds present  EXTREM: No edema noted  SKIN: No rashes noted    Labs:  Labs reviewed, pertinent findings BMP with BUN 18, Cr 0.60. 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 He is currently BMT Transplant Date: BMT Txp 11/2/2018 (19 days).      Afebrile, clinically stable and making successful transition to PO medications.      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  Conditioning per 2015-29: TBI days -4 through -1 followed by matched sibling donor transplant on 11/2   - Engrafted  day +15      # Risk for GVHD: Post-transplant Cytoxan on days +3 and +4 complete   - Continue MMF   - Continue tacrolimus drip (goal 5-10). 11/20: 6.3, switched to PO after, will still do daily levels for now per pharmacy with addition to voriconazole    FEN/Renal:  # Risk for malnutrition: continue TPN/IL, cycled to 16 hours 11/20, and will stay there for the next couple of days  - Dietician following, minimal intake       # Risk for electrolyte abnormalities:  - Check daily electrolytes. Given cardiac history (WPW), keep K>3.5, Mg>1.8, iCa nml.   - Mag and K sliding scale       # Risk for renal dysfunction and fluid overload:  - monitor I/O's and daily weights       # Risk for aHUS/TA-TMA:    - monitor LDH qMonday/Thursday post-BMT until outpatient then weekly through day +100  - monitor urine protein/creatinine qTuesday through day +100      Pulmonary:  # Risk for pulmonary toxicity secondary to chemotherapy:   - monitor respiratory function per unit routine      # Rhinorrhea: 3 mos history, mild. Sinus CT (10/22) with findings of mucosal thickening without evidence of active infection.  RVP negative.      # Pneumomediastinum: finding upon work up Chest CT (10/22), asymptomatic. Per radiology, likely benign, perhaps transient.   - Patient instructed to notify care team with any SOB, dyspnea, or remarkable cough  - none over last 48 hours      Cardiovascular:  # Aaron-Parkinson-White Syndrome: diagnosed upon syncope in 02/2018. Asymptomatic, no interventions to date. Work up EKG c/w WPW, sinus bradycardia at 49 bpm. Work up ECHO normal with EF 68%.   Cardiology visit with Dr. Plummer (10/22): Holter revealed 10 PACs with no evidence of AV block. Normal Echocardiogram with EF 68%. Isael has hx of HR in high 30s with sleep.  - Avoid catecholinergics as able to avoid tachycardia  - Maintain normal iCa+, mag > 1.8, K > 3.5      # Risk for hypertension secondary to medications:   - PRN hydralazine available      Heme:    # Pancytopenia secondary to chemotherapy: hx 1 platelet transfusion, well tolerated  - Transfuse for hemoglobin < 8, platelets < 30,000 (Defibrotide), no premedications  - GCSF until ANC >1500 x3 days      Infectious Disease:  # Risk for infection given immunocompromised status:   Active: Afebrile, concern for possibly purulent drainage at CVC exit site.  - cultured 11/20: pending    Prophylaxis:                                                                                                                        - viral prophylaxis: CMV neg (donor CMV positive), HSV positive. Acyclovir converted to PO Valtrex 11/21  - fungal prophylaxis: high risk - Voriconazole will need level 11/26 (ordered)  - bacterial prophylaxis: Will start Bactrim at day +28 if counts appropriate        # C.difficile colitis: No stool output in last couple of days, will complete a 10 day course of vancomycin on 11/22.       GI:   # Nausea management: has been using ativan before meds, re-timed kytril to 1000 and 2200 prior to medications, can schedule ativan as well if not helpful  - Scheduled medications: Kytril Q12H, convert to PO 11/21  - PRN medications: Ativan (also for anxiety), Marinol, Phenergan        # Risk for VOD: especially high risk  previous Inotuzumab therapy.  Defibrotide ppx  - Ursodiol TID (10 mg/kg) through day +30    # Hemorrhoids: preparation H prn, has not complained of related discomfort in several days.    ENT:  # Epistaxis: occasional drips, nasal mucosa dry  - Ayr gel PRN  - Vaseline to nares      Neuro:  # Mucositis/pain:   - continue to wean morphine as able with plan to stop and make PRN oxycodone available 11/22  - Integrative therapy     # Pruritis: Much improved with no current reports and not using Tucks pads  - benadryl to PRN    Derm:  # Skin breakdown, perianal area: no current concerns  - Has medi honey, silvadene cream, menthol-zinc cream. Tucks pads have been effective for Isael    Social  -Social  worker actively involved in creating an effective plan of care post discharge, parents are not  and communication appear challenging. Have a meeting today with social work and RN coordinator  -Mother has concern of historical non-compliance with medication and cares      The above plan of care was developed by and communicated to me by the Pediatric BMT attending physician, Dr. Jojo Haider.      Shannon J. Schroetter, CPNP-  Pediatric Blood and Marrow Transplant Program  [facility]  Pager: [phone]  [facility] [address] [facility] Phone: [phone]      Pediatric BMT Attending Inpatient Note:  Artemio was seen and evaluated by me today.     The significant interval history includes doing relatively well taking oral medications, working on being awake for morning doses. Has not yet gotten out of bed. Eager to continue working toward discharge. Denies nausea or pain. Afebrile.     I have reviewed changes and data from the last 24 hours, including medications, laboratory results, vital signs and consultant recommendations.     I have formulated and discussed the plan with the BMT team.  The relevant clinical topics addressed included the followin17 y/o male with very high risk ALL admitted for MSD BMT, neutrophil engrafted on day +16, requiring intermittent blood product transfusions and on daily GCSF, at risk for GvHD on tacrolimus and MMF, nausea resolved (benadryl to prn today), h/o WPW with tight electrolyte control, afebrile, C. Diff completing PO vancomycin today, and risk for opportunistic infections, perianal skin breakdown improved, mucositis on TPN/IL (cycled to 16 hrs) and morphine PCA (weaning), at risk for VOD on prophylactic defibrotide and ursodiol. Anticipatory guidance provided on outpatient care needs. Transitioning IV meds to enteral (kytril and valtrex).    I discussed the course and plan with the patient/family and answered all of their  questions to the best of my ability.    My care coordination activities today include oversight of planned lab studies, oversight of medication changes, discussion with BMT team-members and discussion with consultants.    My total floor time today was at least 35 minutes, greater than 50% of which was counseling and coordination of care.    Jojo Haider MD MPH  , Pediatric Blood and Marrow Transplantation  [facility] [phone]

## 2018-11-21 NOTE — PROGRESS NOTES
CLINICAL NUTRITION SERVICES - REASSESSMENT NOTE      ANTHROPOMETRICS  Height/Length: 174 cm  Weight: 62.1 kg  BMI: 20.5 kg/m2, healthy weight for height  Dosing Weight: 65.1 kg (admission weight on 10/28/18)   Comments: weight has decreased 1.2 kg over week (2% body mass loss), a total of 4.6% body mass loss since 10/28/18 (admission); hoewever increase in weight over the past 24 hours      CURRENT NUTRITION ORDERS  Diet: Regular diet      CURRENT NUTRITION SUPPORT   Parenteral Nutrition:  Type of Parenteral Access: Central  PN frequency: Cycled over 16 hours (started on 11/19/19)       PN of Dextrose 225 g, GIR 3.8 mg/kg/min, 98 g Amino Acids, 1.5 g/kg Amino Acids, 240 mL lipids for 1637 kcals, (25 kcal/kg). PN is meeting 100% of kcal needs and 100% of protein needs.      Intake/Tolerance: no po recorded, however working on oral medications and feeling less nauseous per report.       Current factors affecting nutrition intake include:decreased appetite, mucositis and nausea      NEW FINDINGS:  Cycling TPN, improving nausea       LABS  Labs reviewed  TG wnl       MEDICATIONS  Medications reviewed      ASSESSED NUTRITION NEEDS:  Estimated Energy Needs: 25-30 kcal/kg  Estimated Protein Needs: 1.5 g/kg  Estimated Fluid Needs: 1mL/kcal        PEDIATRIC NUTRITION STATUS VALIDATION  Patient does not meet criteria for malnutrition based on adult malnutrition criteria.      EVALUATION OF PREVIOUS PLAN OF CARE:   Monitoring from previous assessment:  Food and Beverage intake- no po recorded  Enteral and parenteral nutrition intake- PN/IL  Anthropometric measurements- see above       Previous Goals:   1. Wt maintenance within 5% of admit weight- goal met but trending towards not met  2. Po and/or nutrition support to meet greater than 75% of nutritional needs- goal met with PN      Previous Nutrition Diagnosis:   Predicted suboptimal nutrient intake related to decreased appetite and po and mucositis with reliance on PN to  meet nutritional needs with potential for interruptions.  Evaluation: ongoing      NUTRITION DIAGNOSIS:  Predicted suboptimal nutrient intake related to decreased appetite and po and mucositis with reliance on PN to meet nutritional needs with potential for interruptions.      INTERVENTIONS  Nutrition Prescription  Nutrition support to meet needs until able to transition back to po      Implementation:  Parenteral Nutrition- Continues on current macronutrients in PN. Collaboration and Referral of Nutrition care- pt discussed with pharmD. Will continue 16 hour but consider cycling further pending PO tolerance and discharge planning.     Goals  1. Wt maintenance within 5% of admit weight  2. Po and/or nutrition support to meet greater than 75% of nutritional needs    FOLLOW UP/MONITORING  Food and Beverage intake- monitor po, Enteral and parenteral nutrition intake- follow for need and Anthropometric measurements- monitor wt    Gracie Yen, RD, CSP, LD  [phone] (pager);  BMT RD pgr: [phone]

## 2018-11-21 NOTE — PLAN OF CARE
Problem: Patient Care Overview  Goal: Plan of Care/Patient Progress Review  Outcome: No Change  Isael with no complaints this shift. Taking oral meds in a timely manner, did call out and ask for PRN ativan prior to his 2200 meds and then he took them without issue. Hourly rounding done.

## 2018-11-21 NOTE — PLAN OF CARE
Problem: Stem Cell/Bone Marrow Transplant (Adult)  Goal: Signs and Symptoms of Listed Potential Problems Will be Absent, Minimized or Managed (Stem Cell/Bone Marrow Transplant)  Signs and symptoms of listed potential problems will be absent, minimized or managed by discharge/transition of care (reference Stem Cell/Bone Marrow Transplant (Adult) CPG).   Outcome: No Change  Patient has been afebrile. VSS and within parameter. O2 sats adequate on RA. LSC, diminished in bases. Good UO overnight, no stool. No C/o of nausea or vomiting. Patient denied pain overnight. PCA remains unchanged. No replacements needed overnight. Family not currently present at bedside. Hourly rounding completed. Continue to monitor.

## 2018-11-21 NOTE — PLAN OF CARE
Problem: Patient Care Overview  Goal: Plan of Care/Patient Progress Review  Afebrile. VSS. LS clear. Weaning morphine PCA, no complaints of pain. One emesis, prn ativan given x1 with no additional complaints. Slept most of the shift. Family visited. Hourly rounding completed.

## 2018-11-22 LAB
ABO + RH BLD: NORMAL
ABO + RH BLD: NORMAL
ALBUMIN SERPL-MCNC: 3.3 G/DL (ref 3.4–5)
ALP SERPL-CCNC: 120 U/L (ref 40–150)
ALT SERPL W P-5'-P-CCNC: 34 U/L (ref 0–70)
ANION GAP SERPL CALCULATED.3IONS-SCNC: 6 MMOL/L (ref 3–14)
AST SERPL W P-5'-P-CCNC: 31 U/L (ref 0–45)
BACTERIA SPEC CULT: NO GROWTH
BASOPHILS # BLD AUTO: 0 10E9/L (ref 0–0.2)
BASOPHILS NFR BLD AUTO: 0 %
BILIRUB DIRECT SERPL-MCNC: 0.2 MG/DL (ref 0–0.2)
BILIRUB SERPL-MCNC: 0.5 MG/DL (ref 0.2–1.3)
BLD GP AB SCN SERPL QL: NORMAL
BLOOD BANK CMNT PATIENT-IMP: NORMAL
BUN SERPL-MCNC: 20 MG/DL (ref 7–30)
CALCIUM SERPL-MCNC: 9 MG/DL (ref 8.5–10.1)
CHLORIDE SERPL-SCNC: 108 MMOL/L (ref 94–109)
CO2 SERPL-SCNC: 24 MMOL/L (ref 20–32)
CREAT SERPL-MCNC: 0.62 MG/DL (ref 0.66–1.25)
DIFFERENTIAL METHOD BLD: ABNORMAL
EOSINOPHIL # BLD AUTO: 0 10E9/L (ref 0–0.7)
EOSINOPHIL NFR BLD AUTO: 0 %
ERYTHROCYTE [DISTWIDTH] IN BLOOD BY AUTOMATED COUNT: 12.5 % (ref 10–15)
FIBRINOGEN PPP-MCNC: 500 MG/DL (ref 200–420)
GFR SERPL CREATININE-BSD FRML MDRD: >90 ML/MIN/1.7M2
GLUCOSE SERPL-MCNC: 131 MG/DL (ref 70–99)
HCT VFR BLD AUTO: 25.1 % (ref 40–53)
HGB BLD-MCNC: 9.1 G/DL (ref 13.3–17.7)
LDH SERPL L TO P-CCNC: 251 U/L (ref 85–227)
LYMPHOCYTES # BLD AUTO: 0 10E9/L (ref 0.8–5.3)
LYMPHOCYTES NFR BLD AUTO: 0 %
Lab: NORMAL
MCH RBC QN AUTO: 30 PG (ref 26.5–33)
MCHC RBC AUTO-ENTMCNC: 36.3 G/DL (ref 31.5–36.5)
MCV RBC AUTO: 83 FL (ref 78–100)
METAMYELOCYTES # BLD: 0.1 10E9/L
METAMYELOCYTES NFR BLD MANUAL: 1 %
MONOCYTES # BLD AUTO: 1.2 10E9/L (ref 0–1.3)
MONOCYTES NFR BLD AUTO: 12.1 %
MYELOCYTES # BLD: 0.2 10E9/L
MYELOCYTES NFR BLD MANUAL: 2 %
NEUTROPHILS # BLD AUTO: 8.7 10E9/L (ref 1.6–8.3)
NEUTROPHILS NFR BLD AUTO: 84.9 %
PLATELET # BLD AUTO: 30 10E9/L (ref 150–450)
PLATELET # BLD EST: ABNORMAL 10*3/UL
POTASSIUM SERPL-SCNC: 4.2 MMOL/L (ref 3.4–5.3)
PROT SERPL-MCNC: 7.1 G/DL (ref 6.8–8.8)
RBC # BLD AUTO: 3.03 10E12/L (ref 4.4–5.9)
RBC MORPH BLD: NORMAL
SODIUM SERPL-SCNC: 138 MMOL/L (ref 133–144)
SPECIMEN EXP DATE BLD: NORMAL
SPECIMEN SOURCE: NORMAL
TACROLIMUS BLD-MCNC: 9.1 UG/L (ref 5–15)
TME LAST DOSE: NORMAL H
WBC # BLD AUTO: 10.2 10E9/L (ref 4–11)

## 2018-11-22 PROCEDURE — 82248 BILIRUBIN DIRECT: CPT | Performed by: PEDIATRICS

## 2018-11-22 PROCEDURE — 25000128 H RX IP 250 OP 636: Performed by: PEDIATRICS

## 2018-11-22 PROCEDURE — C9399 UNCLASSIFIED DRUGS OR BIOLOG: HCPCS | Performed by: NURSE PRACTITIONER

## 2018-11-22 PROCEDURE — 80053 COMPREHEN METABOLIC PANEL: CPT | Performed by: PEDIATRICS

## 2018-11-22 PROCEDURE — 25000128 H RX IP 250 OP 636: Performed by: NURSE PRACTITIONER

## 2018-11-22 PROCEDURE — 25000131 ZZH RX MED GY IP 250 OP 636 PS 637: Performed by: NURSE PRACTITIONER

## 2018-11-22 PROCEDURE — 25000125 ZZHC RX 250: Performed by: PEDIATRICS

## 2018-11-22 PROCEDURE — 86901 BLOOD TYPING SEROLOGIC RH(D): CPT | Performed by: NURSE PRACTITIONER

## 2018-11-22 PROCEDURE — 25000132 ZZH RX MED GY IP 250 OP 250 PS 637: Performed by: PEDIATRICS

## 2018-11-22 PROCEDURE — 25000132 ZZH RX MED GY IP 250 OP 250 PS 637: Performed by: NURSE PRACTITIONER

## 2018-11-22 PROCEDURE — 86850 RBC ANTIBODY SCREEN: CPT | Performed by: NURSE PRACTITIONER

## 2018-11-22 PROCEDURE — 85384 FIBRINOGEN ACTIVITY: CPT | Performed by: PEDIATRICS

## 2018-11-22 PROCEDURE — 85025 COMPLETE CBC W/AUTO DIFF WBC: CPT | Performed by: PEDIATRICS

## 2018-11-22 PROCEDURE — 20000002 ZZH R&B BMT INTERMEDIATE

## 2018-11-22 PROCEDURE — 83615 LACTATE (LD) (LDH) ENZYME: CPT | Performed by: PEDIATRICS

## 2018-11-22 PROCEDURE — 80197 ASSAY OF TACROLIMUS: CPT | Performed by: NURSE PRACTITIONER

## 2018-11-22 PROCEDURE — 86900 BLOOD TYPING SEROLOGIC ABO: CPT | Performed by: NURSE PRACTITIONER

## 2018-11-22 PROCEDURE — C9399 UNCLASSIFIED DRUGS OR BIOLOG: HCPCS | Performed by: PEDIATRICS

## 2018-11-22 RX ORDER — VANCOMYCIN HYDROCHLORIDE 50 MG/ML
125 KIT ORAL 4 TIMES DAILY
Status: DISCONTINUED | OUTPATIENT
Start: 2018-11-22 | End: 2018-11-25

## 2018-11-22 RX ADMIN — URSODIOL 250 MG: 250 TABLET ORAL at 09:46

## 2018-11-22 RX ADMIN — VALACYCLOVIR HYDROCHLORIDE 1000 MG: 500 TABLET, FILM COATED ORAL at 16:52

## 2018-11-22 RX ADMIN — DEFIBROTIDE SODIUM 400 MG: 80 INJECTION, SOLUTION INTRAVENOUS at 00:10

## 2018-11-22 RX ADMIN — MYCOPHENOLATE MOFETIL 960 MG: 500 INJECTION, POWDER, LYOPHILIZED, FOR SOLUTION INTRAVENOUS at 21:40

## 2018-11-22 RX ADMIN — DIPHENHYDRAMINE HYDROCHLORIDE 25 MG: 50 INJECTION, SOLUTION INTRAMUSCULAR; INTRAVENOUS at 00:36

## 2018-11-22 RX ADMIN — VANCOMYCIN HYDROCHLORIDE 125 MG: KIT at 07:41

## 2018-11-22 RX ADMIN — PANTOPRAZOLE SODIUM 40 MG: 40 TABLET, DELAYED RELEASE ORAL at 21:39

## 2018-11-22 RX ADMIN — LORAZEPAM 0.8 MG: 2 INJECTION INTRAMUSCULAR; INTRAVENOUS at 23:40

## 2018-11-22 RX ADMIN — VALACYCLOVIR HYDROCHLORIDE 1000 MG: 500 TABLET, FILM COATED ORAL at 09:47

## 2018-11-22 RX ADMIN — VORICONAZOLE 300 MG: 200 TABLET, FILM COATED ORAL at 21:38

## 2018-11-22 RX ADMIN — DEFIBROTIDE SODIUM 400 MG: 80 INJECTION, SOLUTION INTRAVENOUS at 11:23

## 2018-11-22 RX ADMIN — TACROLIMUS 1 MG: 1 CAPSULE ORAL at 21:39

## 2018-11-22 RX ADMIN — PANTOPRAZOLE SODIUM 40 MG: 40 TABLET, DELAYED RELEASE ORAL at 09:46

## 2018-11-22 RX ADMIN — DIPHENHYDRAMINE HYDROCHLORIDE 25 MG: 50 INJECTION, SOLUTION INTRAMUSCULAR; INTRAVENOUS at 12:20

## 2018-11-22 RX ADMIN — VANCOMYCIN HYDROCHLORIDE 125 MG: KIT at 21:38

## 2018-11-22 RX ADMIN — VALACYCLOVIR HYDROCHLORIDE 1000 MG: 500 TABLET, FILM COATED ORAL at 21:39

## 2018-11-22 RX ADMIN — GRANISETRON HYDROCHLORIDE 1 MG: 1 TABLET, FILM COATED ORAL at 09:47

## 2018-11-22 RX ADMIN — PHYTONADIONE: 1 INJECTION, EMULSION INTRAMUSCULAR; INTRAVENOUS; SUBCUTANEOUS at 19:12

## 2018-11-22 RX ADMIN — VORICONAZOLE 300 MG: 200 TABLET, FILM COATED ORAL at 09:46

## 2018-11-22 RX ADMIN — DEFIBROTIDE SODIUM 400 MG: 80 INJECTION, SOLUTION INTRAVENOUS at 06:45

## 2018-11-22 RX ADMIN — URSODIOL 250 MG: 250 TABLET ORAL at 16:52

## 2018-11-22 RX ADMIN — LORAZEPAM 0.8 MG: 2 INJECTION INTRAMUSCULAR; INTRAVENOUS at 09:44

## 2018-11-22 RX ADMIN — LORAZEPAM 0.8 MG: 2 INJECTION INTRAMUSCULAR; INTRAVENOUS at 19:39

## 2018-11-22 RX ADMIN — I.V. FAT EMULSION 240 ML: 20 EMULSION INTRAVENOUS at 19:12

## 2018-11-22 RX ADMIN — GRANISETRON HYDROCHLORIDE 1 MG: 1 TABLET, FILM COATED ORAL at 21:39

## 2018-11-22 RX ADMIN — LORAZEPAM 0.8 MG: 2 INJECTION INTRAMUSCULAR; INTRAVENOUS at 15:33

## 2018-11-22 RX ADMIN — TACROLIMUS 1 MG: 1 CAPSULE ORAL at 09:47

## 2018-11-22 RX ADMIN — VANCOMYCIN HYDROCHLORIDE 125 MG: KIT at 16:52

## 2018-11-22 RX ADMIN — MYCOPHENOLATE MOFETIL 960 MG: 500 INJECTION, POWDER, LYOPHILIZED, FOR SOLUTION INTRAVENOUS at 04:39

## 2018-11-22 RX ADMIN — URSODIOL 250 MG: 250 TABLET ORAL at 21:39

## 2018-11-22 RX ADMIN — MYCOPHENOLATE MOFETIL 960 MG: 500 INJECTION, POWDER, LYOPHILIZED, FOR SOLUTION INTRAVENOUS at 14:00

## 2018-11-22 NOTE — PLAN OF CARE
Problem: Patient Care Overview  Goal: Plan of Care/Patient Progress Review  Outcome: No Change  VSS and afebrile. Lung sounds clear and diminished in the bases, satting well on room air. Pt denied any pain overnight. No signs or symptoms of nausea noted. PCA remains unchanged, 0 bumps taken during shift. Good urine output. Pt appeared to sleep well between cares. No family at the bedside. Hourly rounding completed.

## 2018-11-22 NOTE — PLAN OF CARE
Problem: Patient Care Overview  Goal: Plan of Care/Patient Progress Review  Outcome: No Change  Isael doing well with his oral meds, he did have one emesis after his 2200 meds, he was given ativan prn prior per his request, he used ativan several times today with good results. Oral meds redosed and he stated he would retake them. He had two soft musousy stools, black in color. Cooperative with cares, polite. Hourly rounding done.

## 2018-11-22 NOTE — PROGRESS NOTES
Pediatric BMT Daily Progress Note    Interval Events: Remains afebrile and reports controlled pain, nausea, and overall feeling okay. He did have some nausea with medications yesterday that was not totally controlled with ativan.  We will discontinued defibrotide prophylaxis today given no clear concerns for development of such to date.  Tacrolimus level yesterday was acceptable and repeat level tomorrow.    Review of Systems: Pertinent positives include those mentioned in interval events. A complete review of systems was performed and is otherwise negative.      Medications:  Please see MAR    Physical Exam:  Temp:  [97.4  F (36.3  C)-99  F (37.2  C)] 98.7  F (37.1  C)  Pulse:  [] 87  Resp:  [16-20] 16  BP: ()/(30-65) 79/46  SpO2:  [98 %-99 %] 98 %  I/O last 3 completed shifts:  In: 2688 [I.V.:924]  Out: 2840 [Urine:2365; Emesis/NG output:225; Stool:250]     GEN: Waking up so not very interactive this morning but overall cooperative with assessment and interview with providers. NAD  HEENT: Normocephalic, atraumatic, alopecia, sclera clear, MMM, lesions healing, nares patent without discharge    CARD: Heart regular rate and rhythm. No murmurs, rubs or gallops.Cap refill 2 seconds.  RESP: Lungs clear in upper lobes, diminished in bases, shallow respirations.  No increased work of breathing, crackles or wheezes.   ABD: Non-distended, non-tender, bowel sounds present  EXTREM: No edema noted  SKIN: No rashes noted    Labs:  Labs reviewed, pertinent findings BMP with BUN 20, Cr 0.62. 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 He is currently BMT Transplant Date: BMT Txp 11/2/2018 (20 days).      Afebrile, clinically stable and making successful transition to PO medications but still with nausea noted.      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  Conditioning per 2015-29: TBI days -4 through -1 followed by matched sibling donor transplant on 11/2   - Engrafted day +15      # Risk for GVHD: Post-transplant Cytoxan on days +3 and +4 complete   - Continue MMF   - Continue tacrolimus (goal 5-10). 11/20: 6.3, and gtt switched to PO after.  The 11/21 level was stable at 6.3 and if today's level is adequate we will change to q48 hours levels.    FEN/Renal:  # Risk for malnutrition: continue TPN/IL, cycled to 16 hours 11/20, and will stay there for the next couple of days  - Dietician following, minimal intake       # Risk for electrolyte abnormalities:  - Check daily electrolytes. Given cardiac history (WPW), keep K>3.5, Mg>1.8, iCa nml.   - Mag and K sliding scale       # Risk for renal dysfunction and fluid overload:  - monitor I/O's and daily weights       # Risk for aHUS/TA-TMA:    - monitor LDH qMonday/Thursday post-BMT until outpatient then weekly through day +100, to date within acceptable range  - monitor urine protein/creatinine qTuesday through day +100, to date within acceptable range      Pulmonary:  # Risk for pulmonary toxicity secondary to chemotherapy:   - monitor respiratory function per unit routine      # Rhinorrhea: 3 mos history, mild. Sinus CT (10/22) with findings of mucosal thickening without evidence of active infection.  RVP negative.      # Pneumomediastinum: finding upon work up Chest CT (10/22), asymptomatic. Per radiology, likely benign, perhaps transient.   - Patient instructed to notify care team with any SOB, dyspnea, or remarkable cough  - none over last 48 hours      Cardiovascular:  # Aaron-Parkinson-White Syndrome: diagnosed upon syncope in 02/2018. Asymptomatic, no interventions to date. Work up EKG c/w WPW, sinus bradycardia at 49 bpm. Work up ECHO normal with EF 68%.   Cardiology visit  with Dr. Plummer (10/22): Holter revealed 10 PACs with no evidence of AV block. Normal Echocardiogram with EF 68%. Isael has hx of HR in high 30s with sleep.  - Avoid catecholinergics as able to avoid tachycardia  - Maintain normal iCa+, mag > 1.8, K > 3.5      # Risk for hypertension secondary to medications:   - PRN hydralazine available      Heme:   # Pancytopenia secondary to chemotherapy: hx 1 platelet transfusion, well tolerated  - Transfuse for hemoglobin < 8, platelets < 10,000 as now off of defibrotide, no premedications  - GCSF will stop today as has met goal parameters to stop      Infectious Disease:  # Risk for infection given immunocompromised status:   Active: Afebrile, concern for possibly purulent drainage at CVC exit site.  - cultured 11/20: NGTD    Prophylaxis:                                                                                                                        - viral prophylaxis: CMV neg (donor CMV positive), HSV positive. Acyclovir converted to PO Valtrex 11/21  - fungal prophylaxis: high risk - Voriconazole will need level 11/26 (ordered)  - bacterial prophylaxis: Will start Bactrim at day +28 if counts appropriate        # C.difficile colitis: No stool output in last couple of days, will complete a 10 day course of vancomycin on 11/22, today       GI:   # Nausea management: has been using ativan before meds with some minimal improvement in tolerance, re-timed kytril to 1000 and 2200 prior to medications, can schedule ativan as well if not helpful  - Scheduled medications: Kytril Q12H, convert to PO 11/21  - PRN medications: Ativan (also for anxiety), Marinol, Phenergan        # Risk for VOD: especially high risk  previous Inotuzumab therapy.  Defibrotide ppx stopped today, 11/22  - Ursodiol TID (10 mg/kg) through day +30    # Hemorrhoids: preparation H prn, has not complained of related discomfort in several days.    ENT:  # Epistaxis: occasional drips, nasal mucosa dry  -  Ayr gel PRN  - Vaseline to nares      Neuro:  # Mucositis/pain:   - Plan to remove continuous rate on PCA but continue bolus dosing for now;  consider discontinuing PCA and changing to PRN oxycodone in near future  - Integrative therapy     # Pruritis: Much improved with no current reports and not using Tucks pads  - benadryl PRN    Derm:  # Skin breakdown, perianal area: no current concerns  - Has medi honey, silvadene cream, menthol-zinc cream. Tucks pads have been effective for Isael; following exam closely    Social  - actively involved in creating an effective plan of care post discharge, parents are not  and communication appear challenging. Completed multidisciplinary team meeting with family on  with social work and RN coordinator  -Mother has concern of historical non-compliance with medication and cares and this continues to be addressed/reinforced by team as able      The above plan of care was developed by and communicated to me by the Pediatric BMT attending physician, Dr. Jojo Haider.      LUIS Foley.   1:08 PM;2018        Pediatric BMT Attending Inpatient Note:  Artemio was seen and evaluated by me today.     The significant interval history includes doing relatively well taking oral medications, some emesis with meds in the evening, better when using prn ativan prior. Discussing plan to get out of bed and take a shower today. Denies pain, tolerating morphine wean well. Afebrile.     I have reviewed changes and data from the last 24 hours, including medications, laboratory results, vital signs and consultant recommendations.     I have formulated and discussed the plan with the BMT team.  The relevant clinical topics addressed included the followin17 y/o male with very high risk ALL admitted for MSD BMT, neutrophil engrafted on day +16, requiring intermittent blood product transfusions, discontinuing scheduled GCSF today, at risk for GvHD on tacrolimus and  MMF, nausea resolved except with PO meds, h/o WPW with tight electrolyte control, afebrile, C. Diff colitis s/p PO vancomycin course with resolution of diarrhea, and risk for opportunistic infections, perianal skin breakdown improved, mucositis on TPN/IL (cycled to 16 hrs) and morphine PCA (discontinuing continuous infusion today), at risk for VOD on ursodiol (discontinuing  prophylactic defibrotide today). Anticipatory guidance provided on outpatient care needs. Transitioning IV meds to enteral (MMF remains IV).     I discussed the course and plan with the patient/family and answered all of their questions to the best of my ability.    My care coordination activities today include oversight of planned lab studies, oversight of medication changes, discussion with BMT team-members and discussion with consultants.    My total floor time today was at least 40 minutes, greater than 50% of which was counseling and coordination of care.    Jojo Haider MD MPH  , Pediatric Blood and Marrow Transplantation  [facility] [phone]

## 2018-11-23 PROBLEM — Z94.81 S/P ALLOGENEIC BONE MARROW TRANSPLANT (H): Status: ACTIVE | Noted: 2018-11-23

## 2018-11-23 LAB
ANION GAP SERPL CALCULATED.3IONS-SCNC: 7 MMOL/L (ref 3–14)
ANISOCYTOSIS BLD QL SMEAR: SLIGHT
BASOPHILS # BLD AUTO: 0 10E9/L (ref 0–0.2)
BASOPHILS NFR BLD AUTO: 0 %
BUN SERPL-MCNC: 23 MG/DL (ref 7–30)
CA-I BLD-MCNC: 5.3 MG/DL (ref 4.4–5.2)
CALCIUM SERPL-MCNC: 9 MG/DL (ref 8.5–10.1)
CHLORIDE SERPL-SCNC: 111 MMOL/L (ref 94–109)
CO2 SERPL-SCNC: 22 MMOL/L (ref 20–32)
CREAT SERPL-MCNC: 0.74 MG/DL (ref 0.66–1.25)
DIFFERENTIAL METHOD BLD: ABNORMAL
ENTERIC PATHOGEN COMMENT: NORMAL
ENTERIC PATHOGEN COMMENT: NORMAL
EOSINOPHIL # BLD AUTO: 0 10E9/L (ref 0–0.7)
EOSINOPHIL NFR BLD AUTO: 0 %
ERYTHROCYTE [DISTWIDTH] IN BLOOD BY AUTOMATED COUNT: 12.7 % (ref 10–15)
GFR SERPL CREATININE-BSD FRML MDRD: >90 ML/MIN/1.7M2
GLUCOSE SERPL-MCNC: 127 MG/DL (ref 70–99)
HADV AG STL QL IA: NEGATIVE
HCT VFR BLD AUTO: 26.1 % (ref 40–53)
HGB BLD-MCNC: 9.4 G/DL (ref 13.3–17.7)
LYMPHOCYTES # BLD AUTO: 0.2 10E9/L (ref 0.8–5.3)
LYMPHOCYTES NFR BLD AUTO: 2 %
MAGNESIUM SERPL-MCNC: 2.2 MG/DL (ref 1.6–2.3)
MCH RBC QN AUTO: 29.9 PG (ref 26.5–33)
MCHC RBC AUTO-ENTMCNC: 36 G/DL (ref 31.5–36.5)
MCV RBC AUTO: 83 FL (ref 78–100)
METAMYELOCYTES # BLD: 0.3 10E9/L
METAMYELOCYTES NFR BLD MANUAL: 3.1 %
MICROCYTES BLD QL SMEAR: PRESENT
MONOCYTES # BLD AUTO: 1.9 10E9/L (ref 0–1.3)
MONOCYTES NFR BLD AUTO: 17.3 %
MYELOCYTES # BLD: 0.3 10E9/L
MYELOCYTES NFR BLD MANUAL: 3.1 %
NEUTROPHILS # BLD AUTO: 8.3 10E9/L (ref 1.6–8.3)
NEUTROPHILS NFR BLD AUTO: 74.5 %
NOROV GI+II ORF1-ORF2 JNC STL QL NAA+PR: NOT DETECTED
PHOSPHATE SERPL-MCNC: 3.8 MG/DL (ref 2.5–4.5)
PLATELET # BLD AUTO: 31 10E9/L (ref 150–450)
PLATELET # BLD EST: ABNORMAL 10*3/UL
POIKILOCYTOSIS BLD QL SMEAR: SLIGHT
POTASSIUM SERPL-SCNC: 4.2 MMOL/L (ref 3.4–5.3)
RBC # BLD AUTO: 3.14 10E12/L (ref 4.4–5.9)
RVA NSP5 STL QL NAA+PROBE: NOT DETECTED
SODIUM SERPL-SCNC: 140 MMOL/L (ref 133–144)
WBC # BLD AUTO: 11.1 10E9/L (ref 4–11)

## 2018-11-23 PROCEDURE — 87798 DETECT AGENT NOS DNA AMP: CPT | Performed by: PEDIATRICS

## 2018-11-23 PROCEDURE — 87449 NOS EACH ORGANISM AG IA: CPT | Performed by: PEDIATRICS

## 2018-11-23 PROCEDURE — 25000131 ZZH RX MED GY IP 250 OP 636 PS 637: Performed by: NURSE PRACTITIONER

## 2018-11-23 PROCEDURE — 80048 BASIC METABOLIC PNL TOTAL CA: CPT | Performed by: NURSE PRACTITIONER

## 2018-11-23 PROCEDURE — 20000002 ZZH R&B BMT INTERMEDIATE

## 2018-11-23 PROCEDURE — 25000132 ZZH RX MED GY IP 250 OP 250 PS 637: Performed by: PEDIATRICS

## 2018-11-23 PROCEDURE — 25000132 ZZH RX MED GY IP 250 OP 250 PS 637: Performed by: NURSE PRACTITIONER

## 2018-11-23 PROCEDURE — 85025 COMPLETE CBC W/AUTO DIFF WBC: CPT | Performed by: NURSE PRACTITIONER

## 2018-11-23 PROCEDURE — 84100 ASSAY OF PHOSPHORUS: CPT | Performed by: NURSE PRACTITIONER

## 2018-11-23 PROCEDURE — 82330 ASSAY OF CALCIUM: CPT | Performed by: PEDIATRICS

## 2018-11-23 PROCEDURE — 83735 ASSAY OF MAGNESIUM: CPT | Performed by: NURSE PRACTITIONER

## 2018-11-23 PROCEDURE — 25000128 H RX IP 250 OP 636: Performed by: PEDIATRICS

## 2018-11-23 PROCEDURE — 81267 CHIMERISM ANAL NO CELL SELEC: CPT | Performed by: NURSE PRACTITIONER

## 2018-11-23 PROCEDURE — 25000128 H RX IP 250 OP 636: Performed by: NURSE PRACTITIONER

## 2018-11-23 PROCEDURE — 25000125 ZZHC RX 250: Performed by: PEDIATRICS

## 2018-11-23 RX ORDER — LORAZEPAM 2 MG/ML
1 INJECTION INTRAMUSCULAR EVERY 12 HOURS
Status: DISCONTINUED | OUTPATIENT
Start: 2018-11-23 | End: 2018-11-24

## 2018-11-23 RX ORDER — LORAZEPAM 2 MG/ML
1 INJECTION INTRAMUSCULAR EVERY 6 HOURS PRN
Status: DISCONTINUED | OUTPATIENT
Start: 2018-11-23 | End: 2018-11-24

## 2018-11-23 RX ADMIN — TACROLIMUS 1 MG: 1 CAPSULE ORAL at 09:50

## 2018-11-23 RX ADMIN — GRANISETRON HYDROCHLORIDE 1 MG: 1 TABLET, FILM COATED ORAL at 09:50

## 2018-11-23 RX ADMIN — MYCOPHENOLATE MOFETIL 960 MG: 500 INJECTION, POWDER, LYOPHILIZED, FOR SOLUTION INTRAVENOUS at 05:33

## 2018-11-23 RX ADMIN — PHYTONADIONE: 1 INJECTION, EMULSION INTRAMUSCULAR; INTRAVENOUS; SUBCUTANEOUS at 19:54

## 2018-11-23 RX ADMIN — LORAZEPAM 0.8 MG: 2 INJECTION INTRAMUSCULAR; INTRAVENOUS at 04:32

## 2018-11-23 RX ADMIN — GRANISETRON HYDROCHLORIDE 1 MG: 1 TABLET, FILM COATED ORAL at 21:59

## 2018-11-23 RX ADMIN — LORAZEPAM 0.8 MG: 2 INJECTION INTRAMUSCULAR; INTRAVENOUS at 09:48

## 2018-11-23 RX ADMIN — VORICONAZOLE 300 MG: 200 TABLET, FILM COATED ORAL at 09:49

## 2018-11-23 RX ADMIN — I.V. FAT EMULSION 240 ML: 20 EMULSION INTRAVENOUS at 19:54

## 2018-11-23 RX ADMIN — VALACYCLOVIR HYDROCHLORIDE 1000 MG: 500 TABLET, FILM COATED ORAL at 09:50

## 2018-11-23 RX ADMIN — LORAZEPAM 1 MG: 2 INJECTION INTRAMUSCULAR; INTRAVENOUS at 13:58

## 2018-11-23 RX ADMIN — MORPHINE SULFATE: 1 INJECTION, SOLUTION INTRAVENOUS at 13:32

## 2018-11-23 RX ADMIN — URSODIOL 250 MG: 250 TABLET ORAL at 09:50

## 2018-11-23 RX ADMIN — DIPHENHYDRAMINE HYDROCHLORIDE 25 MG: 50 INJECTION, SOLUTION INTRAMUSCULAR; INTRAVENOUS at 01:48

## 2018-11-23 RX ADMIN — VORICONAZOLE 300 MG: 200 TABLET, FILM COATED ORAL at 21:59

## 2018-11-23 RX ADMIN — VALACYCLOVIR HYDROCHLORIDE 1000 MG: 500 TABLET, FILM COATED ORAL at 21:59

## 2018-11-23 RX ADMIN — PANTOPRAZOLE SODIUM 40 MG: 40 TABLET, DELAYED RELEASE ORAL at 09:50

## 2018-11-23 RX ADMIN — TACROLIMUS 1 MG: 1 CAPSULE ORAL at 21:59

## 2018-11-23 RX ADMIN — VANCOMYCIN HYDROCHLORIDE 125 MG: KIT at 07:58

## 2018-11-23 RX ADMIN — PANTOPRAZOLE SODIUM 40 MG: 40 TABLET, DELAYED RELEASE ORAL at 22:00

## 2018-11-23 RX ADMIN — URSODIOL 250 MG: 250 TABLET ORAL at 21:59

## 2018-11-23 RX ADMIN — VALACYCLOVIR HYDROCHLORIDE 1000 MG: 500 TABLET, FILM COATED ORAL at 16:22

## 2018-11-23 RX ADMIN — MYCOPHENOLATE MOFETIL 960 MG: 500 INJECTION, POWDER, LYOPHILIZED, FOR SOLUTION INTRAVENOUS at 22:41

## 2018-11-23 RX ADMIN — MYCOPHENOLATE MOFETIL 960 MG: 500 INJECTION, POWDER, LYOPHILIZED, FOR SOLUTION INTRAVENOUS at 13:44

## 2018-11-23 RX ADMIN — DIPHENHYDRAMINE HYDROCHLORIDE 25 MG: 50 INJECTION, SOLUTION INTRAMUSCULAR; INTRAVENOUS at 09:49

## 2018-11-23 RX ADMIN — LORAZEPAM 1 MG: 2 INJECTION INTRAMUSCULAR; INTRAVENOUS at 21:55

## 2018-11-23 RX ADMIN — VANCOMYCIN HYDROCHLORIDE 125 MG: KIT at 17:56

## 2018-11-23 RX ADMIN — URSODIOL 250 MG: 250 TABLET ORAL at 16:22

## 2018-11-23 NOTE — PROGRESS NOTES
[facility]   PEDIATRIC BMT SOCIAL WORK PROGRESS NOTE  DATA:     Multiple meetings with patient and family this week regarding planning for discharge, tentatively next Tuesday. Patient's mom had several concerns with discharge and both his parents and step parents wanted to meet as a group with  and inpatient nurse coordinator to discuss caregiver plan. Caregiver plan was discussed at length on Wednesday nov. 21st in meeting with Isael, Mervat (mom), Dangelo (Dad), Camryn (step mom) and Jada (mom's S.O.).  Family was able to agree on plan for creating caregiving calendar based on everyone's work schedule. Primary care givers will be Mervat, Dangelo and Camryn. Patient wants to stay full time at his dad and camryn's house initially and then will reassess a few weeks post discharge if he wants to go back and forth from his dad's house to his mom's apartment. Dangelo and Camryn stated clearly that they wanted Mervat to feel comfortable coming to their house to help care for Isael and that they have created a full caregiving suite on their lower level (finished basement). Isael's parents confirmed that between the three of them someone would always be home with Isael. They also discussed ways to track that Isael was taking his meds on time as well as any other pertinent updates by using a notebook for tracking information. They also discussed setting up a group chat for any major updates.   explained that if any other questions or concerns came up either before or after discharge they could always be addressed and resolved in real time at clinic appointments.        INTERVENTION:      Caregiver coordination plan for blended family      ASSESSMENT:      Isael and his parents and step parents all in agreement with caregiver plan. All stated they were satisfied with outcome of the meeting.     PLAN:    will provide ongoing psychosocial support to  patient and family as needed.    REJI Barnes, [facility]    Pediatric Blood and Marrow Transplant  [phone]  [email or web address]      11/23/2018   12:55 PM         Copied from Chart Review:     PEDIATRIC BLOOD & MARROW TRANSPLANT SOCIAL WORK PSYCHOSOCIAL ASSESSMENT                         Date: 10/26/18          Assessment of living situation, support system, financial status, functional status, coping abilities/strategies, stressors, need for resources and other social work interventions.          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          Physician(s): Dr. Viky Zavala      Nurse Coordinator: Daria Jara          Presenting Information:       Artemio is a 18 year old male patient with Acute Lymphoblastic Leukemia (ALL) who is pursuing curative treatment through a bone marrow transplant. Artemio was admitted to the bone marrow transplant unit at the [facility] on [facility] Oct. 28th to begin his pre-transplant conditioning regimen. Artemio's older brother Link will be his matched sibling donor. Link is an adult and therefore he can provide his own consent for marrow donation procedure.           Special Considerations/Accomodations:       Patient's parents Dangelo and Mervat are  but amicable. They are both involved in Artemio's care as sources of support. Dangelo is remarried to his wife Camryn and Mervat has a significant other named Jada.  explained to Dangelo and Mervat that the care team will be unable  to update multiple family members on a daily basis and recommended they come up with a system to update each other. They decided to trial keeping a notebook in Artemio's room and whoever is there getting updates from the doctor on any given day can take notes and leave them there for the next person to read and update as necessary.               Contact/Legal Info:       Decision Maker(s): Artemio is an adult and able to make his own medical decisions at this time. He wants all medical decisions to go through him and not his parents.      Special Custody Considerations: N/A      Advance Directive: Artemio states he completed an advanced directive while undergoing cancer treatment at [facility]. He signed a release of information so his treatment team at [facility] can access a copy.       Permanent Address: [address] [place] [address] Lives with  paternal grandmother and paternal grandfather      Local Address: [address] [address] [place] [id number]   Staying locally with father and step mother until he is greater than 100 days post BMT.      Phone number(s): Mom - Mervat: [phone]                                                       Dad - Dangelo: [phone]                                                       Step-Mom - Camryn: [phone]              Support System/Relationship Status/Family History: Artemio's parents and paternal grandparents are his primary support system. His parents, Mervat and Dangelo, are  and civil, but prefer to visit Artemio at different times and communicate directly with Artemio versus with each other as much as possible.  Dangelo is remarried and his wife's name is Camryn. Artemio has given verbal permission for his parents and Camryn all to get any information they would like to know. Edwige explained to all of his parents that the medical team will not be able to update multiple family members per day and that they should try to  update each other as much as possible.   Artemio also has two siblings, an older sister and brother. His sister Kamilah lives locally in [place], [place] is  and has one child. His brother Link lives in [place], where Mervat's family is from. Mervat had also relocated back to [place] to assist her parents as they aged but has come back to [place] to assist with the transplant process.   Patient previously had been living in his patnernal grandparents basement. There were some concerns from various family members that this environment was not suitable for a post BMT patient. (reasoning was that it was damp and musty and it was unknown if mold and mildew were present. Fortunately, Artemio decided that going back to live at his grandparents house during post BMT follow up was not a good idea for several reasons, mainly because his grandparents have their own health issues and he didn't want to stress them out by living at their house while also going through his own health issues..  Artemio states he does not have a significant other at this time.          Unique Patient/Family Needs:  Establishing effective communication pattern with patient and his parents.      Spirituality/Lottie Affiliation: Patient does not identify with lottie community        Communication Preferences: Artemio wants all medical communication and decisions to go through him. He prefers direct to the point communication. He does not like to be bothered with examinations and questions if they're not necessary.              Caregiver Plan: Parents, Artemio's primary care givers will be Mervat Pierosn and step-mom Camryn. He will be staying at his dad and step-mom's house after discharge.  continuing to work with Artemio's parents on this plan. Initially it was reported by other family members that Dangelo preferred not to have Mervat at their house when it was her turn to provide care giving duties and that same  "individual reported that Mervat preferred not to be at Dangelo and Camryn's house. The other most likely option would be for Isael to go back and forth from Dangelo and Camryn's house to Mervat's apartment. Sofier advised them to come up with a plan based on what was best for Artemio, what his preferences are, and what their family can manage.       Patient & Caregiver Knowledge of Medical Condition and Plan of Care: Artemio and his family are understanding of his medical condition and plan of care for BMT.      Patient and Caregiver Transplant Goals: Artemio and his parents state they \"Just want to get through it\".              Patient Personality/Communication/Coping/Interests/Activities: approachable, withdrawn and quiet. Patient states that when he doesn't feel good he irish by playing video games, watching movies or TV and sleeping. He also states he's not a morning person and prefers not to be bothered at night or if he's napping. He requested nursing bundle cares when possible. Edwige explained that the nurses try to be as accommodating as possible to patient preferences but it is not always possible due to the large number tasks they need to complete every day with every patient.      Patient Education/Developmental Level: Patient graduated from [employer] approximately a year ago and had been taking some time off before deciding about college. He plans to revisit applying to higher education once he is done with the BMT process.              Logistical Considerations:  Transportation: Private Car  Parking: at this time parents state they can afford to buy monthly parking passes.  Housing: patient and family is local.  Mervat has rented a short term apartment in [place] until Artemio is through transplant and she can return to [place].          Financial: Patient and his parents state that at this time they are able to meet their expenses with their current income. Because he essentially has 3 " parents to share the care giving work, they all plan to continue working and therefore are hoping to maintain their current income.      Insurance:   Primary: Blue [employer] of [place], Camryn carries this insurance.  Secondary: Priscilla ROBERTS  Unique Issues?:       Patient/Caregiver Sources of Income/Employment: Patient is not employed at this time due to his frequent treatment needs related to his ALL.   Patient's mom, Mervat, is a tele nurse and has started a short term part time casual position at [employer] in [place]. Patient's Dad, Dangelo, owns his own commercial painting business. Since he's the owner he has some flexibility in his schedule and can be away from work regularly. Patient's step mom, Camryn, is a nurse and works at one of the [employer] in the [employer] [place].  Financial Concerns: none at this time. Artemio and his parents were encouraged to reach out if they had any financial concerns in the future.              Patient/Family Psychosocial Considerations:      Mental Health: No mental health issues identified         Chemical Health: No issues identified        Trauma/Loss/Abuse History: No identified issues        Legal Issues: No issues identified              Clinical Assessment and Recommendations:       Patient and family present as well-informed about and prepared for the treatment process. I did not identify any significant barriers to them managing the demands of treatment.          Concerns: none identified at this time.      Education Provided: Transplant process expectations, Caregiver requirements, Caregiver self-care, Financial issues related to transplant, Financial resources/grants available, Common psychosocial stressors pre/post transplant, Hopsital resources available and Social work role        Interventions Provided:   Education and counseling related to psychosocial issues and resources      Follow up planned:  Psychosocial support

## 2018-11-23 NOTE — PLAN OF CARE
Problem: Patient Care Overview  Goal: Plan of Care/Patient Progress Review  Isael had several watery, yellow stools, which he flushed and he described as explosive, cultures ordered, will send with next stool. Also having increase in abd cramping. Increased difficulty getting him to take oral meds, but he did take all except for one dose of vanco, using prn ativan to help nausea. He let us examine his skin breakdown in his dominga area and it looks to be healing, reinforced the need to shower and keep area clean. Hourly rounding done.

## 2018-11-23 NOTE — PROGRESS NOTES
Pediatric BMT Daily Progress Note    Interval Events: Remains afebrile but with increased nausea and continued loose explosive stools (does not go in hat so difficult to quantify). Also now with abdominal cramping overnight, stools sent for adeno, rota and norovirus. Continues on PO vanco for c. Diff.    Review of Systems: Pertinent positives include those mentioned in interval events. A complete review of systems was performed and is otherwise negative.      Medications:  Please see MAR    Physical Exam:  Temp:  [98.4  F (36.9  C)-99  F (37.2  C)] 98.6  F (37  C)  Pulse:  [84-94] 89  Resp:  [16-18] 16  BP: ()/(30-60) 99/60  SpO2:  [97 %-99 %] 97 %  I/O last 3 completed shifts:  In: 2468 [I.V.:550]  Out: -      GEN: Waking up so not very interactive this morning but overall cooperative with assessment and interview with providers. NAD  HEENT: Normocephalic, atraumatic, alopecia, sclera clear, MMM, lesions healing, nares patent without discharge    CARD: Heart regular rate and rhythm. No murmurs, rubs or gallops.Cap refill 2 seconds.  RESP: Lungs clear in upper lobes, diminished in bases, shallow respirations.  No increased work of breathing, crackles or wheezes.   ABD: Non-distended, non-tender, bowel sounds present  EXTREM: No edema noted  SKIN: No rashes noted    Labs:  Labs reviewed, pertinent findings BMP with BUN 23, Cr 0.74. 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 He is currently BMT Transplant Date: BMT Txp 11/2/2018 (21 days).      Increased nausea and continued loose stools. Concern for med intolerance vs infection vs start of GvHD vs opioid withdrawal with wean over the last few days.      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   Conditioning per 2015-29: TBI days -4 through -1 followed by matched sibling donor transplant on 11/2   - Engrafted day +15  - bone marrow biopsy scheduled for 11/27      # Risk for GVHD: Post-transplant Cytoxan on days +3 and +4 complete   - Continue MMF   - Continue tacrolimus (goal 5-10). 11/20: 6.3, and gtt switched to PO after.  11/22 level 9.1, repeat 11/24 with starting voriconazole earlier this week and level rising.    FEN/Renal:  # Risk for malnutrition: continue TPN/IL, currently cycled to 16 hours  - Dietician following, minimal intake       # Risk for electrolyte abnormalities:  - Check daily electrolytes. Given cardiac history (WPW), keep K>3.5, Mg>1.8, iCa nml.   - Mag and K sliding scale       # Risk for renal dysfunction and fluid overload:  - monitor I/O's and daily weights       # Risk for aHUS/TA-TMA:    - monitor LDH qMonday/Thursday post-BMT until outpatient then weekly through day +100, to date within acceptable range  - monitor urine protein/creatinine qTuesday through day +100, to date within acceptable range      Pulmonary:  # Risk for pulmonary toxicity secondary to chemotherapy:   - monitor respiratory function per unit routine      # Rhinorrhea: 3 mos history, mild. Sinus CT (10/22) with findings of mucosal thickening without evidence of active infection.  RVP negative.      # Pneumomediastinum: finding upon work up Chest CT (10/22), asymptomatic. Per radiology, likely benign, perhaps transient.   - Patient instructed to notify care team with any SOB, dyspnea, or remarkable cough  - none over last 48 hours      Cardiovascular:  # Aaron-Parkinson-White Syndrome: diagnosed upon syncope in 02/2018. Asymptomatic, no interventions to date. Work up EKG c/w WPW, sinus bradycardia at 49 bpm. Work up ECHO normal with EF 68%.   Cardiology visit with Dr. Plummer (10/22): Holter revealed 10 PACs with no evidence of AV block. Normal Echocardiogram with EF 68%. Isael has hx of HR in high 30s with  "sleep.  - Avoid catecholinergics as able to avoid tachycardia  - Maintain normal iCa+, mag > 1.8, K > 3.5      # Risk for hypertension secondary to medications:   - no need for PRNs at this time as has been running low      Heme:   # Pancytopenia secondary to chemotherapy: hx 1 platelet transfusion, well tolerated  - Transfuse for hemoglobin < 8, platelets < 10,000 as now off of defibrotide, no premedications  - GCSF now PRN for ANC < 1000      Infectious Disease:  # Risk for infection given immunocompromised status:   Active: Afebrile, concern for possibly purulent drainage at CVC exit site with dressing change.  - cultured 11/20: NGTD    Prophylaxis:                                                                                                                        - viral prophylaxis: CMV neg (donor CMV positive), HSV positive. Valtrex. Weekly testing 11/18 negative  - fungal prophylaxis: high risk - Voriconazole will need level 11/26 (ordered)  - bacterial prophylaxis: Will start Bactrim at day +28 if counts appropriate        # C.difficile colitis: Has been stooling directly into toilet and flushing, continues to have loose, watery, \"explosive\" stools  - continue on oral vanco for now  - encouraged stool into hat if possible      GI:   # Nausea management: worsened over the last couple of days (medication intolerance vs GvHD vs infection vs opioid withdrawal)  - Scheduled medications: Kytril Q12H, ativan q 12 both prior to medications  - PRN medications: Ativan (also for anxiety), benadryl      # Risk for VOD: especially high risk  previous Inotuzumab therapy.  Defibrotide ppx stopped today, 11/22  - Ursodiol TID (10 mg/kg) through day +30    # Hemorrhoids: preparation H prn, has not complained of related discomfort in several days.    ENT:  # Epistaxis: occasional drips, nasal mucosa dry  - Ayr gel PRN  - Vaseline to nares      Neuro:  # Mucositis/pain:   - Increased abdominal pain over night (cramping), " concern for infection vs medication intolerance vs GI GvHD vs opioid withdrawal  - added low basal rate back to morphine to day, continue with bumps q 20 minutes   - Integrative therapy     # Pruritis: Much improved with no current reports and not using Tucks pads  - benadryl PRN    Derm:  # Skin breakdown, perianal area: no current concerns  - Has medi honey, silvadene cream, menthol-zinc cream. Tucks pads have been effective for Isael; following exam closely    Social  - actively involved in creating an effective plan of care post discharge, parents are not  and communication appear challenging. Completed multidisciplinary team meeting with family on 11/21 with social work and RN coordinator  -Mother has concern of historical non-compliance with medication and cares and this continues to be addressed/reinforced by team as able      The above plan of care was developed by and communicated to me by the Pediatric BMT attending physician, Dr. Candido Joshua.      Shannon J. Schroetter, CPNP-  Pediatric Blood and Marrow Transplant Program  [facility]  Pager: [phone]  [facility] Phone: [phone]    BMT Attending Note:    Artemio Nino was seen and evaluated by me today.     Interval history: Over the past 24 hours, specific issues of note have included increased difficulties with nausea and diarrhea.  We have not found any new infectious reasons for this, and it may well be due to the decrease of the narcotics, as he could be having some withdrawal.  The blood counts are encouraging.  He is having some difficulty with taking oral medications.  There are no respiratory difficulties. I have reviewed changes and data in the status over the last 24 hours, including the vital signs, medications and lab results.  I assisted in formulating a plan, which was discussed amongst the BMT team.  This plan was also shared with the family, and I  answered all questions to the best of my ability.      My care coordination activities today include oversight of planned lab studies, medication changes and discussion with BMT team-members including nursing.    The total amount of time spent in the care of Artemio Nino today was >40 minutes, at least 50% of which was counseling and coordination of care.    Candido Joshua MD  Professor, Dept. Of Pediatrics  Division of Blood and Marrow Transplantation

## 2018-11-23 NOTE — PLAN OF CARE
Problem: Stem Cell/Bone Marrow Transplant (Adult)  Goal: Signs and Symptoms of Listed Potential Problems Will be Absent, Minimized or Managed (Stem Cell/Bone Marrow Transplant)  Signs and symptoms of listed potential problems will be absent, minimized or managed by discharge/transition of care (reference Stem Cell/Bone Marrow Transplant (Adult) CPG).   Pt was afebrile, VSS. Lung sounds clear, sats well on RA. Pain expressed in abdomen, mostly experienced as cramping. Pt took 9 bumps, PRN Ativan x2, PRN Benadryl x1. No n/v. Pt had diarrhea x3 tonight, stool culture sent. Mother at bedside, hourly rounding completed, continue POC.

## 2018-11-23 NOTE — PLAN OF CARE
Problem: Patient Care Overview  Goal: Plan of Care/Patient Progress Review  Outcome: Declining    Afebrile, VSS. LS clear on RA. C/o abdominal cramping but denying pain. Continuous Morphine PCA rate added at 0.3 mg/hr with the same PCA dose of 0.5 mg. PRN ativan given x2 and Benadryl x1. Watery stool x2. Voiding. Family and friends at bedside throughout shift. Hourly rounding completed. Continue to monitor and notify MD with changes.

## 2018-11-24 LAB
ANION GAP SERPL CALCULATED.3IONS-SCNC: 6 MMOL/L (ref 3–14)
BASOPHILS # BLD AUTO: 0 10E9/L (ref 0–0.2)
BASOPHILS NFR BLD AUTO: 0 %
BUN SERPL-MCNC: 20 MG/DL (ref 7–30)
CALCIUM SERPL-MCNC: 8.3 MG/DL (ref 8.5–10.1)
CHLORIDE SERPL-SCNC: 111 MMOL/L (ref 94–109)
CMV DNA SPEC NAA+PROBE-ACNC: NORMAL [IU]/ML
CMV DNA SPEC NAA+PROBE-LOG#: NORMAL {LOG_IU}/ML
CO2 SERPL-SCNC: 23 MMOL/L (ref 20–32)
CREAT SERPL-MCNC: 0.72 MG/DL (ref 0.66–1.25)
DIFFERENTIAL METHOD BLD: ABNORMAL
EOSINOPHIL # BLD AUTO: 0 10E9/L (ref 0–0.7)
EOSINOPHIL NFR BLD AUTO: 0 %
ERYTHROCYTE [DISTWIDTH] IN BLOOD BY AUTOMATED COUNT: 12.6 % (ref 10–15)
GFR SERPL CREATININE-BSD FRML MDRD: >90 ML/MIN/1.7M2
GLUCOSE SERPL-MCNC: 141 MG/DL (ref 70–99)
HCT VFR BLD AUTO: 23.1 % (ref 40–53)
HGB BLD-MCNC: 8.5 G/DL (ref 13.3–17.7)
LYMPHOCYTES # BLD AUTO: 0.8 10E9/L (ref 0.8–5.3)
LYMPHOCYTES NFR BLD AUTO: 14 %
MCH RBC QN AUTO: 30.2 PG (ref 26.5–33)
MCHC RBC AUTO-ENTMCNC: 36.8 G/DL (ref 31.5–36.5)
MCV RBC AUTO: 82 FL (ref 78–100)
MONOCYTES # BLD AUTO: 1.9 10E9/L (ref 0–1.3)
MONOCYTES NFR BLD AUTO: 34 %
MYELOCYTES # BLD: 0.3 10E9/L
MYELOCYTES NFR BLD MANUAL: 5 %
NEUTROPHILS # BLD AUTO: 2.6 10E9/L (ref 1.6–8.3)
NEUTROPHILS NFR BLD AUTO: 47 %
PLATELET # BLD AUTO: 18 10E9/L (ref 150–450)
POTASSIUM SERPL-SCNC: 3.9 MMOL/L (ref 3.4–5.3)
RBC # BLD AUTO: 2.81 10E12/L (ref 4.4–5.9)
SODIUM SERPL-SCNC: 140 MMOL/L (ref 133–144)
SPECIMEN SOURCE: NORMAL
TACROLIMUS BLD-MCNC: 9.8 UG/L (ref 5–15)
TME LAST DOSE: NORMAL H
WBC # BLD AUTO: 5.5 10E9/L (ref 4–11)

## 2018-11-24 PROCEDURE — 25000125 ZZHC RX 250: Performed by: PEDIATRICS

## 2018-11-24 PROCEDURE — 85025 COMPLETE CBC W/AUTO DIFF WBC: CPT | Performed by: NURSE PRACTITIONER

## 2018-11-24 PROCEDURE — 80048 BASIC METABOLIC PNL TOTAL CA: CPT | Performed by: NURSE PRACTITIONER

## 2018-11-24 PROCEDURE — 25000132 ZZH RX MED GY IP 250 OP 250 PS 637: Performed by: PEDIATRICS

## 2018-11-24 PROCEDURE — 25000128 H RX IP 250 OP 636: Performed by: NURSE PRACTITIONER

## 2018-11-24 PROCEDURE — 87081 CULTURE SCREEN ONLY: CPT | Performed by: NURSE PRACTITIONER

## 2018-11-24 PROCEDURE — 25000132 ZZH RX MED GY IP 250 OP 250 PS 637: Performed by: NURSE PRACTITIONER

## 2018-11-24 PROCEDURE — 25000128 H RX IP 250 OP 636: Performed by: PEDIATRICS

## 2018-11-24 PROCEDURE — 25000131 ZZH RX MED GY IP 250 OP 636 PS 637: Performed by: NURSE PRACTITIONER

## 2018-11-24 PROCEDURE — 25800025 ZZH RX 258: Performed by: PEDIATRICS

## 2018-11-24 PROCEDURE — 20000002 ZZH R&B BMT INTERMEDIATE

## 2018-11-24 PROCEDURE — 25000125 ZZHC RX 250: Performed by: NURSE PRACTITIONER

## 2018-11-24 PROCEDURE — 80197 ASSAY OF TACROLIMUS: CPT | Performed by: PEDIATRICS

## 2018-11-24 RX ORDER — LORAZEPAM 2 MG/ML
1 INJECTION INTRAMUSCULAR EVERY 12 HOURS
Status: COMPLETED | OUTPATIENT
Start: 2018-11-24 | End: 2018-11-24

## 2018-11-24 RX ORDER — MORPHINE SULFATE 2 MG/ML
1 INJECTION, SOLUTION INTRAMUSCULAR; INTRAVENOUS
Status: DISCONTINUED | OUTPATIENT
Start: 2018-11-24 | End: 2018-11-25

## 2018-11-24 RX ORDER — MORPHINE SULFATE 2 MG/ML
2 INJECTION, SOLUTION INTRAMUSCULAR; INTRAVENOUS EVERY 6 HOURS
Status: DISCONTINUED | OUTPATIENT
Start: 2018-11-24 | End: 2018-11-25

## 2018-11-24 RX ORDER — HYDROXYZINE HYDROCHLORIDE 25 MG/ML
25 INJECTION, SOLUTION INTRAMUSCULAR EVERY 6 HOURS PRN
Status: DISCONTINUED | OUTPATIENT
Start: 2018-11-24 | End: 2018-11-26

## 2018-11-24 RX ORDER — EMOLLIENT BASE
CREAM (GRAM) TOPICAL 4 TIMES DAILY
Status: DISCONTINUED | OUTPATIENT
Start: 2018-11-24 | End: 2018-11-27 | Stop reason: HOSPADM

## 2018-11-24 RX ORDER — MYCOPHENOLATE MOFETIL 500 MG/1
1000 TABLET ORAL 3 TIMES DAILY
Status: DISCONTINUED | OUTPATIENT
Start: 2018-11-25 | End: 2018-11-27 | Stop reason: HOSPADM

## 2018-11-24 RX ORDER — LORAZEPAM 0.5 MG/1
1 TABLET ORAL 2 TIMES DAILY
Status: DISCONTINUED | OUTPATIENT
Start: 2018-11-25 | End: 2018-11-27 | Stop reason: HOSPADM

## 2018-11-24 RX ORDER — HEPARIN SODIUM (PORCINE) LOCK FLUSH IV SOLN 100 UNIT/ML 100 UNIT/ML
5 SOLUTION INTRAVENOUS
Status: DISCONTINUED | OUTPATIENT
Start: 2018-11-24 | End: 2018-11-27 | Stop reason: HOSPADM

## 2018-11-24 RX ORDER — LORAZEPAM 0.5 MG/1
0.5 TABLET ORAL EVERY 4 HOURS PRN
Status: DISCONTINUED | OUTPATIENT
Start: 2018-11-24 | End: 2018-11-27 | Stop reason: HOSPADM

## 2018-11-24 RX ORDER — LANOLIN ALCOHOL/MO/W.PET/CERES
3 CREAM (GRAM) TOPICAL AT BEDTIME
Status: DISCONTINUED | OUTPATIENT
Start: 2018-11-24 | End: 2018-11-27 | Stop reason: HOSPADM

## 2018-11-24 RX ADMIN — VORICONAZOLE 300 MG: 200 TABLET, FILM COATED ORAL at 21:59

## 2018-11-24 RX ADMIN — I.V. FAT EMULSION 240 ML: 20 EMULSION INTRAVENOUS at 20:47

## 2018-11-24 RX ADMIN — Medication 0.8 MG: at 21:58

## 2018-11-24 RX ADMIN — MORPHINE SULFATE 2 MG: 2 INJECTION, SOLUTION INTRAMUSCULAR; INTRAVENOUS at 22:30

## 2018-11-24 RX ADMIN — MORPHINE SULFATE 2 MG: 2 INJECTION, SOLUTION INTRAMUSCULAR; INTRAVENOUS at 16:23

## 2018-11-24 RX ADMIN — VANCOMYCIN HYDROCHLORIDE 125 MG: KIT at 17:24

## 2018-11-24 RX ADMIN — HYDROXYZINE HYDROCHLORIDE 25 MG: 25 INJECTION, SOLUTION INTRAMUSCULAR at 21:04

## 2018-11-24 RX ADMIN — VALACYCLOVIR HYDROCHLORIDE 1000 MG: 500 TABLET, FILM COATED ORAL at 21:59

## 2018-11-24 RX ADMIN — VORICONAZOLE 300 MG: 200 TABLET, FILM COATED ORAL at 09:42

## 2018-11-24 RX ADMIN — MYCOPHENOLATE MOFETIL 960 MG: 500 INJECTION, POWDER, LYOPHILIZED, FOR SOLUTION INTRAVENOUS at 14:14

## 2018-11-24 RX ADMIN — VANCOMYCIN HYDROCHLORIDE 125 MG: KIT at 14:13

## 2018-11-24 RX ADMIN — PANTOPRAZOLE SODIUM 40 MG: 40 TABLET, DELAYED RELEASE ORAL at 09:42

## 2018-11-24 RX ADMIN — Medication: at 21:55

## 2018-11-24 RX ADMIN — URSODIOL 250 MG: 250 TABLET ORAL at 21:59

## 2018-11-24 RX ADMIN — VALACYCLOVIR HYDROCHLORIDE 1000 MG: 500 TABLET, FILM COATED ORAL at 16:24

## 2018-11-24 RX ADMIN — LORAZEPAM 1 MG: 2 INJECTION INTRAMUSCULAR; INTRAVENOUS at 21:56

## 2018-11-24 RX ADMIN — LORAZEPAM 0.5 MG: 0.5 TABLET ORAL at 17:24

## 2018-11-24 RX ADMIN — GRANISETRON HYDROCHLORIDE 1 MG: 1 TABLET, FILM COATED ORAL at 21:58

## 2018-11-24 RX ADMIN — MYCOPHENOLATE MOFETIL 960 MG: 500 INJECTION, POWDER, LYOPHILIZED, FOR SOLUTION INTRAVENOUS at 22:00

## 2018-11-24 RX ADMIN — URSODIOL 250 MG: 250 TABLET ORAL at 16:24

## 2018-11-24 RX ADMIN — HEPARIN SODIUM (PORCINE) LOCK FLUSH IV SOLN 100 UNIT/ML 5 ML: 100 SOLUTION at 20:56

## 2018-11-24 RX ADMIN — PANTOPRAZOLE SODIUM 40 MG: 40 TABLET, DELAYED RELEASE ORAL at 21:58

## 2018-11-24 RX ADMIN — MELATONIN TAB 3 MG 3 MG: 3 TAB at 22:00

## 2018-11-24 RX ADMIN — VALACYCLOVIR HYDROCHLORIDE 1000 MG: 500 TABLET, FILM COATED ORAL at 09:42

## 2018-11-24 RX ADMIN — MYCOPHENOLATE MOFETIL 960 MG: 500 INJECTION, POWDER, LYOPHILIZED, FOR SOLUTION INTRAVENOUS at 05:40

## 2018-11-24 RX ADMIN — POTASSIUM CHLORIDE, DEXTROSE MONOHYDRATE AND SODIUM CHLORIDE: 75; 5; 450 INJECTION, SOLUTION INTRAVENOUS at 17:24

## 2018-11-24 RX ADMIN — PHYTONADIONE: 1 INJECTION, EMULSION INTRAMUSCULAR; INTRAVENOUS; SUBCUTANEOUS at 20:46

## 2018-11-24 RX ADMIN — LORAZEPAM 1 MG: 2 INJECTION INTRAMUSCULAR; INTRAVENOUS at 07:05

## 2018-11-24 RX ADMIN — LORAZEPAM 1 MG: 2 INJECTION INTRAMUSCULAR; INTRAVENOUS at 10:02

## 2018-11-24 RX ADMIN — MORPHINE SULFATE 2 MG: 2 INJECTION, SOLUTION INTRAMUSCULAR; INTRAVENOUS at 12:51

## 2018-11-24 RX ADMIN — GRANISETRON HYDROCHLORIDE 1 MG: 1 TABLET, FILM COATED ORAL at 09:42

## 2018-11-24 RX ADMIN — VANCOMYCIN HYDROCHLORIDE 125 MG: KIT at 21:58

## 2018-11-24 RX ADMIN — URSODIOL 250 MG: 250 TABLET ORAL at 09:42

## 2018-11-24 RX ADMIN — DIPHENHYDRAMINE HYDROCHLORIDE 25 MG: 50 INJECTION, SOLUTION INTRAMUSCULAR; INTRAVENOUS at 10:30

## 2018-11-24 RX ADMIN — TACROLIMUS 1 MG: 1 CAPSULE ORAL at 09:42

## 2018-11-24 RX ADMIN — VANCOMYCIN HYDROCHLORIDE 125 MG: KIT at 09:42

## 2018-11-24 RX ADMIN — VANCOMYCIN HYDROCHLORIDE 125 MG: KIT at 00:19

## 2018-11-24 NOTE — PLAN OF CARE
Problem: Stem Cell/Bone Marrow Transplant (Adult)  Goal: Signs and Symptoms of Listed Potential Problems Will be Absent, Minimized or Managed (Stem Cell/Bone Marrow Transplant)  Signs and symptoms of listed potential problems will be absent, minimized or managed by discharge/transition of care (reference Stem Cell/Bone Marrow Transplant (Adult) CPG).   Outcome: No Change    [id number]: Patient refused morning VS. Otherwise, afternoon VSS & pt afebrile. Pt complaining of nausea/adominal pain this morning. Scheduled ativan & PRN benadryl given with some relief provided. Morphine PCA discontinued. No appetite. Adequate UO. Continuing to have loose/mucous stools. Galvan dressing CDI. Pt still needs port heparin flushed this evening; will pass on to oncoming RN. MDs discussed VS & medication schedule with patient & patient agreeable to plan. Mom, Dad & Step-mom at bedside attentive to pt's needs & participating in Galvan education. Hourly rounding completed.

## 2018-11-24 NOTE — PLAN OF CARE
Problem: Patient Care Overview  Goal: Plan of Care/Patient Progress Review  Outcome: No Change  Patient had temp max 99.6 ax and other vitals remain stable.  Offered no complaints of nausea and was able to take PO meds after scheduled dose of Ativan.  He had one loose stool in toilet so no volume available.  Continues to be on Morphine drip at 0.3 mg/hr and states the bolus does not do much so he elects not to use them Hourly rounding completed.

## 2018-11-24 NOTE — PROGRESS NOTES
Pediatric BMT Daily Progress Note    Interval Events: Remains afebrile. Nausea better controlled after restarting continuous morphine. Continues to have loose stools though amount and consistency not able to be assessed as he continues to go in the toilet and flushing before anyone can assess. Reports still having abdominal cramping but morphine not helpful. Not sleeping well, reports multiple interruptions. Per mother had two small nose bleeds this AM. Weight down to 56.9kg (admission weight was 65.1kg).    Review of Systems: Pertinent positives include those mentioned in interval events. A complete review of systems was performed and is otherwise negative.      Medications:  Please see MAR    Physical Exam:  Temp:  [97  F (36.1  C)-99.6  F (37.6  C)] 99.3  F (37.4  C)  Pulse:  [91-98] 94  Heart Rate:  [78-98] 98  Resp:  [16-20] 18  BP: ()/(50-64) 102/63  SpO2:  [96 %-98 %] 98 %  I/O last 3 completed shifts:  In: 2510 [I.V.:800]  Out: 330 [Urine:280; Stool:50]     GEN: Awake and somewhat talkative, but does not offer much information, cooperative with assessment. NAD  HEENT: Normocephalic, atraumatic, alopecia, sclera clear, MMM, lesions healing, nares patent without discharge    CARD: Heart regular rate and rhythm. No murmurs, rubs or gallops.Cap refill 2 seconds.  RESP: Lungs clear in upper lobes, diminished in bases, shallow respirations.  No increased work of breathing, crackles or wheezes.   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 He is currently BMT Transplant Date: BMT Txp 11/2/2018 (22 days).      Nausea improved with restart of morphine but stools and cramping continue. Difficult to  assess as he doesn't stool/urinate in hat. Continues to lose weight.      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  Conditioning per 2015-29: TBI days -4 through -1 followed by matched sibling donor transplant on 11/2   - Engrafted day +15  - bone marrow biopsy scheduled for 11/27      # Risk for GVHD: Post-transplant Cytoxan on days +3 and +4 complete   - Continue MMF through day +35, convert to PO 11/25  - Continue tacrolimus (goal 5-10). 11/24: 9.8, recheck level 11/26.     FEN/Renal:  # Risk for malnutrition: continue TPN/IL, currently cycled to 12 hours  - weight trending down (lowest he's been at 59.6kg)  - Dietician following, minimal intake       # Risk for electrolyte abnormalities:  - Check daily electrolytes. Given cardiac history (WPW), keep K>3.5, Mg>1.8, iCa nml.   - Mag and K sliding scale       # Risk for renal dysfunction and fluid overload:  - monitor I/O's and daily weights       # Risk for aHUS/TA-TMA:    - monitor LDH qMonday/Thursday post-BMT until outpatient then weekly through day +100, to date within acceptable range  - monitor urine protein/creatinine qTuesday through day +100, to date within acceptable range      Pulmonary:  # Risk for pulmonary toxicity secondary to chemotherapy:   - monitor respiratory function per unit routine      # Rhinorrhea: 3 mos history, mild. Sinus CT (10/22) with findings of mucosal thickening without evidence of active infection.  RVP negative.      # Pneumomediastinum: finding upon work up Chest CT (10/22), asymptomatic. Per radiology, likely benign, perhaps transient.   - Patient instructed to notify care team with any SOB, dyspnea, or remarkable cough  - none over last several days      Cardiovascular:  # Aaron-Parkinson-White Syndrome: diagnosed upon syncope in 02/2018. Asymptomatic, no interventions to date. Work up EKG c/w WPW, sinus bradycardia  "at 49 bpm. Work up ECHO normal with EF 68%.   Cardiology visit with Dr. Plummer (10/22): Holter revealed 10 PACs with no evidence of AV block. Normal Echocardiogram with EF 68%. Isael has hx of HR in high 30s with sleep.  - Avoid catecholinergics as able to avoid tachycardia  - Maintain normal iCa+, mag > 1.8, K > 3.5      # Risk for hypertension secondary to medications:   - no need for PRNs at this time as has been running low      Heme:   # Pancytopenia secondary to chemotherapy: hx 1 platelet transfusion, well tolerated  - Transfuse for hemoglobin < 8, platelets < 10,000 as now off of defibrotide, no premedications  - GCSF now PRN for ANC < 1000      Infectious Disease:  # Risk for infection given immunocompromised status:   Active: Afebrile, concern for possibly purulent drainage at CVC exit site with dressing change.  - cultured 11/20: NGTD    Prophylaxis:                                                                                                                        - viral prophylaxis: CMV neg (donor CMV positive), HSV positive. Valtrex. Weekly testing 11/18 negative  - fungal prophylaxis: high risk - Voriconazole will need level 11/26 (ordered)  - bacterial prophylaxis: Will start Bactrim at day +28 if counts appropriate        # C.difficile colitis: Has been stooling directly into toilet and flushing, continues to have loose, watery, \"explosive\" stools  - continue on oral vanco for now x 14 days as do not have good assessment of stools  - encouraged stool into hat if possible particularly with current weight loss, talked to Artemio about importance again today      GI:   # Nausea management: improved with re-starting continuous morphine (medication intolerance vs GvHD vs infection vs opioid withdrawal)  - Scheduled medications: Kytril Q12H, ativan q 12 both prior to medications  - PRN medications: Ativan (also for anxiety), benadryl      # Risk for VOD: especially high risk  previous Inotuzumab " therapy.  Defibrotide ppx stopped today, 11/22  - Ursodiol TID (10 mg/kg) through day +30    # Hemorrhoids: preparation H prn, has not complained of related discomfort in several days.    ENT:  # Epistaxis: occasional drips, nasal mucosa dry  - Ayr gel PRN  - Vaseline to nares      Neuro:  # Mucositis/pain:   - continues with abdominal cramping but doesn't seem as severe today  - convert morphine gtt to scheduled IV morphine q 6 with available PRN for breakthrough  - encourage warm packs, moving around, and monitoring if happens around stooling or not  - Integrative therapy     # Pruritis: Much improved with no current reports and not using Tucks pads  - benadryl PRN    Derm:  # Skin breakdown, perianal area: no current concerns  - Has medi honey, silvadene cream, menthol-zinc cream. Tucks pads have been effective for Isael; following exam closely    # Insomnia: Artemio having trouble sleeping, with abdominal cramping and overnight interruptions. Discussed the necessity of his care here which he understands.   Agreed to schedule of the following:  - vitals at 04, 10, 16, 22 timed with his medications  - weight @ 10  - taking his medications within 30-45 minutes of RN brining them in so they don't have to come back repeatedly to remind him  - labs at 04  - expressed to Artemio that schedule would not be perfect and he states understanding. Also discussed that it is his responsibility to do his part and comply  - will start melatonin 3 mg QHS    Social  - actively involved in creating an effective plan of care post discharge, parents are not  and communication appear challenging. Completed multidisciplinary team meeting with family on 11/21 with social work and RN coordinator  -Mother has concern of historical non-compliance with medication and cares and this continues to be addressed/reinforced by team as able      The above plan of care was developed by and communicated to me by the Pediatric  BMT attending physician, Dr. Candido Joshua.      Shannon J. Schroetter, BENJA-  Pediatric Blood and Marrow Transplant Program  [facility]  Pager: [phone]  [facility] Phone: [phone]    BMT Attending Note:    Artemio Nino was seen and evaluated by me today.     Interval history: Over the past 24 hours, his nausea was somewhat improved, suggesting that some of this may be related to withdrawal.  He continues to state that he has loose stools as well, but he has refused to allow us to collect this to determine volumes.  He has not been having difficulty with respiratory issues, nor has pain been problematic.  We have not identified any new infectious concerns. The blood counts are encouraging. I have reviewed changes and data in the status over the last 24 hours, including the vital signs, medications and lab results.  I assisted in formulating a plan, which was discussed amongst the BMT team.  This plan was also shared with the family, and I answered all questions to the best of my ability.      My care coordination activities today include oversight of planned lab studies, medication changes and discussion with BMT team-members including nursing.    The total amount of time spent in the care of Artemio Nino today was >40 minutes, at least 50% of which was counseling and coordination of care.    Candido Joshua MD  Professor, Dept. Of Pediatrics  Division of Blood and Marrow Transplantation

## 2018-11-24 NOTE — PLAN OF CARE
11/24/18 I saw mom(Mervat),dad(Dangelo)and step mom(Camryn)in the pt's room at 9 AM for PLC CVC and 12:30 for PLC TPN education.Patient observed some of the CVC appointment.Mervat and Camryn are nurses and observed the appointments but did not feel they needed to do any return demonstration.Teaching directed mainly at dad.Dad correctly returned all CVC/TPN skills using [facility] supplies on PLC model per written material.Family asking appropriate questions,able to answer all teach back,and verbalized understanding of content presented.Continue to reinforce information.Home care to follow.All written material given and reviewed today,including [facility] web site.Also see education flow sheet.

## 2018-11-25 LAB
ANION GAP SERPL CALCULATED.3IONS-SCNC: 7 MMOL/L (ref 3–14)
ANISOCYTOSIS BLD QL SMEAR: SLIGHT
BASOPHILS # BLD AUTO: 0 10E9/L (ref 0–0.2)
BASOPHILS NFR BLD AUTO: 1 %
BUN SERPL-MCNC: 20 MG/DL (ref 7–30)
CALCIUM SERPL-MCNC: 8.2 MG/DL (ref 8.5–10.1)
CHLORIDE SERPL-SCNC: 110 MMOL/L (ref 94–109)
CO2 SERPL-SCNC: 24 MMOL/L (ref 20–32)
CREAT SERPL-MCNC: 0.67 MG/DL (ref 0.66–1.25)
DIFFERENTIAL METHOD BLD: ABNORMAL
EOSINOPHIL # BLD AUTO: 0 10E9/L (ref 0–0.7)
EOSINOPHIL NFR BLD AUTO: 0 %
ERYTHROCYTE [DISTWIDTH] IN BLOOD BY AUTOMATED COUNT: 12.7 % (ref 10–15)
GFR SERPL CREATININE-BSD FRML MDRD: >90 ML/MIN/1.7M2
GLUCOSE SERPL-MCNC: 142 MG/DL (ref 70–99)
HCT VFR BLD AUTO: 23 % (ref 40–53)
HGB BLD-MCNC: 8.2 G/DL (ref 13.3–17.7)
LYMPHOCYTES # BLD AUTO: 0.1 10E9/L (ref 0.8–5.3)
LYMPHOCYTES NFR BLD AUTO: 2.9 %
MCH RBC QN AUTO: 30 PG (ref 26.5–33)
MCHC RBC AUTO-ENTMCNC: 35.7 G/DL (ref 31.5–36.5)
MCV RBC AUTO: 84 FL (ref 78–100)
METAMYELOCYTES # BLD: 0.4 10E9/L
METAMYELOCYTES NFR BLD MANUAL: 9.8 %
MICROCYTES BLD QL SMEAR: PRESENT
MONOCYTES # BLD AUTO: 1 10E9/L (ref 0–1.3)
MONOCYTES NFR BLD AUTO: 25.5 %
MYELOCYTES # BLD: 0.1 10E9/L
MYELOCYTES NFR BLD MANUAL: 2 %
NEUTROPHILS # BLD AUTO: 2.4 10E9/L (ref 1.6–8.3)
NEUTROPHILS NFR BLD AUTO: 58.8 %
NRBC # BLD AUTO: 0 10*3/UL
NRBC BLD AUTO-RTO: 1 /100
PLATELET # BLD AUTO: 22 10E9/L (ref 150–450)
PLATELET # BLD EST: ABNORMAL 10*3/UL
POIKILOCYTOSIS BLD QL SMEAR: SLIGHT
POTASSIUM SERPL-SCNC: 3.7 MMOL/L (ref 3.4–5.3)
RBC # BLD AUTO: 2.73 10E12/L (ref 4.4–5.9)
RBC INCLUSIONS BLD: SLIGHT
SODIUM SERPL-SCNC: 141 MMOL/L (ref 133–144)
SPECIMEN SOURCE: NORMAL
SPECIMEN SOURCE: NORMAL
TRIGL SERPL-MCNC: 195 MG/DL
WBC # BLD AUTO: 4.1 10E9/L (ref 4–11)
YEAST SPEC QL CULT: NO GROWTH
YEAST SPEC QL CULT: NO GROWTH

## 2018-11-25 PROCEDURE — 86922 COMPATIBILITY TEST ANTIGLOB: CPT | Performed by: NURSE PRACTITIONER

## 2018-11-25 PROCEDURE — 86880 COOMBS TEST DIRECT: CPT | Performed by: NURSE PRACTITIONER

## 2018-11-25 PROCEDURE — 25000132 ZZH RX MED GY IP 250 OP 250 PS 637: Performed by: NURSE PRACTITIONER

## 2018-11-25 PROCEDURE — 86860 RBC ANTIBODY ELUTION: CPT | Performed by: NURSE PRACTITIONER

## 2018-11-25 PROCEDURE — 25000128 H RX IP 250 OP 636: Performed by: NURSE PRACTITIONER

## 2018-11-25 PROCEDURE — 25000131 ZZH RX MED GY IP 250 OP 636 PS 637: Performed by: NURSE PRACTITIONER

## 2018-11-25 PROCEDURE — 86901 BLOOD TYPING SEROLOGIC RH(D): CPT | Performed by: NURSE PRACTITIONER

## 2018-11-25 PROCEDURE — 86870 RBC ANTIBODY IDENTIFICATION: CPT | Performed by: NURSE PRACTITIONER

## 2018-11-25 PROCEDURE — 25000125 ZZHC RX 250: Performed by: PEDIATRICS

## 2018-11-25 PROCEDURE — 86850 RBC ANTIBODY SCREEN: CPT | Performed by: NURSE PRACTITIONER

## 2018-11-25 PROCEDURE — 20000002 ZZH R&B BMT INTERMEDIATE

## 2018-11-25 PROCEDURE — 80048 BASIC METABOLIC PNL TOTAL CA: CPT | Performed by: NURSE PRACTITIONER

## 2018-11-25 PROCEDURE — 25000128 H RX IP 250 OP 636: Performed by: PEDIATRICS

## 2018-11-25 PROCEDURE — 85025 COMPLETE CBC W/AUTO DIFF WBC: CPT | Performed by: NURSE PRACTITIONER

## 2018-11-25 PROCEDURE — 84478 ASSAY OF TRIGLYCERIDES: CPT | Performed by: PEDIATRICS

## 2018-11-25 PROCEDURE — 86900 BLOOD TYPING SEROLOGIC ABO: CPT | Performed by: NURSE PRACTITIONER

## 2018-11-25 RX ORDER — MYCOPHENOLATE MOFETIL 500 MG/1
1000 TABLET ORAL 3 TIMES DAILY
Qty: 66 TABLET | Refills: 0 | Status: SHIPPED | OUTPATIENT
Start: 2018-11-27 | End: 2018-12-11

## 2018-11-25 RX ORDER — LORAZEPAM 1 MG/1
1 TABLET ORAL 2 TIMES DAILY
Qty: 60 TABLET | Refills: 0 | Status: SHIPPED | OUTPATIENT
Start: 2018-11-27 | End: 2018-12-03

## 2018-11-25 RX ORDER — SULFAMETHOXAZOLE/TRIMETHOPRIM 800-160 MG
2 TABLET ORAL
Qty: 32 TABLET | Refills: 3 | Status: SHIPPED | OUTPATIENT
Start: 2018-12-03 | End: 2018-11-26

## 2018-11-25 RX ORDER — OXYCODONE HYDROCHLORIDE 5 MG/1
5 TABLET ORAL 3 TIMES DAILY
Status: DISCONTINUED | OUTPATIENT
Start: 2018-11-25 | End: 2018-11-27 | Stop reason: HOSPADM

## 2018-11-25 RX ORDER — VALACYCLOVIR HYDROCHLORIDE 1 G/1
1000 TABLET, FILM COATED ORAL 3 TIMES DAILY
Qty: 90 TABLET | Refills: 3 | Status: SHIPPED | OUTPATIENT
Start: 2018-11-27 | End: 2018-12-21

## 2018-11-25 RX ORDER — ACETAMINOPHEN 325 MG/1
650 TABLET ORAL EVERY 4 HOURS PRN
Qty: 100 TABLET | Refills: 3 | Status: SHIPPED | OUTPATIENT
Start: 2018-11-27 | End: 2018-12-28

## 2018-11-25 RX ORDER — PANTOPRAZOLE SODIUM 40 MG/1
40 TABLET, DELAYED RELEASE ORAL 2 TIMES DAILY
Qty: 30 TABLET | Refills: 3 | Status: SHIPPED | OUTPATIENT
Start: 2018-11-25 | End: 2018-12-21

## 2018-11-25 RX ORDER — VORICONAZOLE 50 MG/1
TABLET, FILM COATED ORAL
Qty: 120 TABLET | Refills: 3 | Status: SHIPPED | OUTPATIENT
Start: 2018-11-27 | End: 2018-11-27

## 2018-11-25 RX ORDER — EMOLLIENT BASE
CREAM (GRAM) TOPICAL 4 TIMES DAILY
Qty: 453 G | Refills: 0 | Status: SHIPPED | OUTPATIENT
Start: 2018-11-25 | End: 2018-12-28

## 2018-11-25 RX ORDER — OXYCODONE HYDROCHLORIDE 5 MG/1
5 TABLET ORAL EVERY 4 HOURS PRN
Status: DISCONTINUED | OUTPATIENT
Start: 2018-11-25 | End: 2018-11-27 | Stop reason: HOSPADM

## 2018-11-25 RX ORDER — DRONABINOL 2.5 MG/1
2.5 CAPSULE ORAL 2 TIMES DAILY
Status: DISCONTINUED | OUTPATIENT
Start: 2018-11-25 | End: 2018-11-27 | Stop reason: HOSPADM

## 2018-11-25 RX ORDER — DRONABINOL 2.5 MG/1
2.5 CAPSULE ORAL 2 TIMES DAILY
Qty: 60 CAPSULE | Refills: 0 | Status: SHIPPED | OUTPATIENT
Start: 2018-11-27 | End: 2018-12-21

## 2018-11-25 RX ORDER — VORICONAZOLE 200 MG/1
TABLET, FILM COATED ORAL
Qty: 60 TABLET | Refills: 3 | Status: SHIPPED | OUTPATIENT
Start: 2018-11-27 | End: 2018-11-27

## 2018-11-25 RX ORDER — GRANISETRON HYDROCHLORIDE 1 MG/1
1 TABLET, FILM COATED ORAL EVERY 12 HOURS
Qty: 60 TABLET | Refills: 3 | Status: SHIPPED | OUTPATIENT
Start: 2018-11-25 | End: 2018-12-14

## 2018-11-25 RX ORDER — LANOLIN ALCOHOL/MO/W.PET/CERES
3 CREAM (GRAM) TOPICAL AT BEDTIME
Qty: 30 TABLET | Refills: 3 | Status: SHIPPED | OUTPATIENT
Start: 2018-11-27 | End: 2018-12-11

## 2018-11-25 RX ADMIN — VALACYCLOVIR HYDROCHLORIDE 1000 MG: 500 TABLET, FILM COATED ORAL at 21:21

## 2018-11-25 RX ADMIN — VALACYCLOVIR HYDROCHLORIDE 1000 MG: 500 TABLET, FILM COATED ORAL at 10:45

## 2018-11-25 RX ADMIN — Medication 0.8 MG: at 10:46

## 2018-11-25 RX ADMIN — Medication 0.8 MG: at 21:24

## 2018-11-25 RX ADMIN — MORPHINE SULFATE 2 MG: 2 INJECTION, SOLUTION INTRAMUSCULAR; INTRAVENOUS at 04:00

## 2018-11-25 RX ADMIN — SODIUM CHLORIDE, PRESERVATIVE FREE 3 ML: 5 INJECTION INTRAVENOUS at 10:45

## 2018-11-25 RX ADMIN — MYCOPHENOLATE MOFETIL 1000 MG: 500 TABLET, FILM COATED ORAL at 16:41

## 2018-11-25 RX ADMIN — PHYTONADIONE: 1 INJECTION, EMULSION INTRAMUSCULAR; INTRAVENOUS; SUBCUTANEOUS at 21:27

## 2018-11-25 RX ADMIN — OXYCODONE HYDROCHLORIDE 5 MG: 5 TABLET ORAL at 21:22

## 2018-11-25 RX ADMIN — MELATONIN TAB 3 MG 3 MG: 3 TAB at 21:23

## 2018-11-25 RX ADMIN — DRONABINOL 2.5 MG: 2.5 CAPSULE ORAL at 16:41

## 2018-11-25 RX ADMIN — OXYCODONE HYDROCHLORIDE 5 MG: 5 TABLET ORAL at 11:06

## 2018-11-25 RX ADMIN — VORICONAZOLE 300 MG: 200 TABLET, FILM COATED ORAL at 21:23

## 2018-11-25 RX ADMIN — VORICONAZOLE 300 MG: 200 TABLET, FILM COATED ORAL at 10:45

## 2018-11-25 RX ADMIN — SODIUM CHLORIDE, PRESERVATIVE FREE 3 ML: 5 INJECTION INTRAVENOUS at 10:50

## 2018-11-25 RX ADMIN — VALACYCLOVIR HYDROCHLORIDE 1000 MG: 500 TABLET, FILM COATED ORAL at 16:41

## 2018-11-25 RX ADMIN — MYCOPHENOLATE MOFETIL 1000 MG: 500 TABLET, FILM COATED ORAL at 21:23

## 2018-11-25 RX ADMIN — PANTOPRAZOLE SODIUM 40 MG: 40 TABLET, DELAYED RELEASE ORAL at 10:47

## 2018-11-25 RX ADMIN — Medication: at 21:26

## 2018-11-25 RX ADMIN — Medication: at 16:48

## 2018-11-25 RX ADMIN — PANTOPRAZOLE SODIUM 40 MG: 40 TABLET, DELAYED RELEASE ORAL at 21:22

## 2018-11-25 RX ADMIN — LORAZEPAM 1 MG: 0.5 TABLET ORAL at 10:46

## 2018-11-25 RX ADMIN — Medication: at 10:51

## 2018-11-25 RX ADMIN — LORAZEPAM 1 MG: 0.5 TABLET ORAL at 21:22

## 2018-11-25 RX ADMIN — GRANISETRON HYDROCHLORIDE 1 MG: 1 TABLET, FILM COATED ORAL at 21:24

## 2018-11-25 RX ADMIN — GRANISETRON HYDROCHLORIDE 1 MG: 1 TABLET, FILM COATED ORAL at 10:46

## 2018-11-25 RX ADMIN — MYCOPHENOLATE MOFETIL 1000 MG: 500 TABLET, FILM COATED ORAL at 10:46

## 2018-11-25 RX ADMIN — OXYCODONE HYDROCHLORIDE 5 MG: 5 TABLET ORAL at 16:42

## 2018-11-25 RX ADMIN — I.V. FAT EMULSION 240 ML: 20 EMULSION INTRAVENOUS at 21:29

## 2018-11-25 NOTE — PROGRESS NOTES
Pediatric BMT Daily Progress Note    Interval Events: Remains afebrile. Nausea and pain seemingly better controlled. New rash on neck noted overnight, pruritic. Tolerating medication weans and conversion to oral medications.  Review of Systems: Pertinent positives include those mentioned in interval events. A complete review of systems was performed and is otherwise negative.      Medications:  Please see MAR    Physical Exam:  Temp:  [98.1  F (36.7  C)-99  F (37.2  C)] 98.1  F (36.7  C)  Heart Rate:  [] 100  Resp:  [16-24] 24  BP: ()/(53-65) 92/53  SpO2:  [95 %-100 %] 100 %  I/O last 3 completed shifts:  In: 2402 [I.V.:720]  Out: 1215 [Urine:1015; Stool:200]     GEN: Awake,  cooperative with assessment, provides answers to questions though brief and one-worded. NAD  HEENT: Normocephalic, atraumatic, alopecia, sclera clear, MMM, lesions healing, nares patent without discharge    CARD: Heart regular rate and rhythm. No murmurs, rubs or gallops.Cap refill 2 seconds.  RESP: Lungs clear in upper lobes, diminished in bases, shallow respirations.  No increased work of breathing, crackles or wheezes.   ABD: Non-distended, non-tender, bowel sounds present  EXTREM: No edema noted  SKIN: New erythematous rash on neck.     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 He is currently BMT Transplant Date: BMT Txp 11/2/2018 (23 days).      Nausea and pain improved. Stool output appears to be minimal by charting. New rash noted on neck.      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  Conditioning per 2015-29: TBI days -4 through -1 followed by matched sibling donor transplant on 11/2   - Engrafted  day +15  - bone marrow biopsy scheduled for 11/27 (will need to be NPO)      # Risk for GVHD: Post-transplant Cytoxan on days +3 and +4 complete   - Continue MMF through day +35, convert to PO 11/25  - Continue tacrolimus (goal 5-10). 11/24: 9.8, dose decreased as rising with voriconazole addition. Repeat level 11/26    FEN/Renal:  # Risk for malnutrition: continue TPN/IL, currently cycled to 12 hours  - weight trending down (lowest he's been at 59.6kg)  - starting eligio counts  - starting marinol BID for appetite stimulation, increase as needed  - dietician following      # Risk for electrolyte abnormalities:  - Check daily electrolytes. Given cardiac history (WPW), keep K>3.5, Mg>1.8, iCa nml.   - Mag and K sliding scale       # Risk for renal dysfunction and fluid overload:  - monitor I/O's and daily weights       # Risk for aHUS/TA-TMA:    - monitor LDH qMonday/Thursday post-BMT until outpatient then weekly through day +100, to date within acceptable range  - monitor urine protein/creatinine qTuesday through day +100, to date within acceptable range      Pulmonary:  # Risk for pulmonary toxicity secondary to chemotherapy:   - monitor respiratory function per unit routine      # Rhinorrhea: 3 mos history, mild. Sinus CT (10/22) with findings of mucosal thickening without evidence of active infection.  RVP negative.      # Pneumomediastinum: finding upon work up Chest CT (10/22), asymptomatic. Per radiology, likely benign, perhaps transient.   - Patient instructed to notify care team with any SOB, dyspnea, or remarkable cough  - none over last several days      Cardiovascular:  # Aaron-Parkinson-White Syndrome: diagnosed upon syncope in 02/2018. Asymptomatic, no interventions to date. Work up EKG c/w WPW, sinus bradycardia at 49 bpm. Work up ECHO normal with EF 68%.   Cardiology visit with Dr. Plummer (10/22): Holter revealed 10 PACs with no evidence of AV block. Normal Echocardiogram with EF 68%. Isael has hx of  HR in high 30s with sleep.  - Avoid catecholinergics as able to avoid tachycardia  - Maintain normal iCa+, mag > 1.8, K > 3.5      # Risk for hypertension secondary to medications:   - no need for PRNs at this time as has been running low      Heme:   # Pancytopenia secondary to chemotherapy:   - Transfuse for hemoglobin < 8, platelets < 10,000 as now off of defibrotide, no premedications  - GCSF now PRN for ANC < 1000      Infectious Disease:  # Risk for infection given immunocompromised status:   Active: Afebrile,   - central line exit site cultured 11/20: NGTD    Prophylaxis:                                                                                                                        - viral prophylaxis: CMV neg (donor CMV positive), HSV positive. Valtrex. Weekly testing 11/18 negative  - fungal prophylaxis: high risk - Voriconazole will need level 11/26 (ordered)  - bacterial prophylaxis: Will start Bactrim at day +28 if counts appropriate        # C.difficile colitis: Per documentation over last 24 hours, stools appear to be improved. Artemio reports has small stools every time he goes to the bathroom, loose and watery, but not large volume  - stop oral vanco today and monitor stools  - continue to encourage to stool in hat      GI:   # Nausea management: improved overall  - Scheduled medications: Kytril Q12H, ativan q 12 both prior to medications, both now oral  - PRN medications: Ativan (also for anxiety), benadryl      # Risk for VOD: especially high risk  previous Inotuzumab therapy.  Defibrotide ppx stopped today, 11/22  - stopped ursodiol today, and monitor LFTs    # Hemorrhoids: preparation H prn, has not complained of related discomfort in several days.    ENT:  # Epistaxis: occasional drips, nasal mucosa dry  - Ayr gel PRN  - Vaseline to nares      Neuro:  # Mucositis/pain:   - continues with abdominal cramping but a little better than previous  - convert morphine to oxycodone TID with  available PRN, will continue to wean as able  - encourage warm packs, moving around, and monitoring if happens around stooling or not  - Integrative therapy     # Pruritis: resolved in anal area, now with rash on neck  - benadryl and hydroxyzine PRN    Derm:  # Skin breakdown, perianal area: no current concerns, and last assessed by Kale and Dr. Alcaraz on 11/22    # Insomnia: Artemio having trouble sleeping, with abdominal cramping and overnight interruptions. Discussed the necessity of his care here which he understands.   Agreed to schedule of the following:  - vitals at 04, 10, 16, 22 timed with his medications  - weight @ 10  - taking his medications within 30-45 minutes of RN brining them in so they don't have to come back repeatedly to remind him  - labs at 04  - expressed to Artemio that schedule would not be perfect and he states understanding. Also discussed that it is his responsibility to do his part and comply  - continue melatonin 3 mg QHS    Social  - actively involved in creating an effective plan of care post discharge, parents are not  and communication appear challenging. Completed multidisciplinary team meeting with family on 11/21 with social work and RN coordinator  -Mother has concern of historical non-compliance with medication and cares and this continues to be addressed/reinforced by team as able      The above plan of care was developed by and communicated to me by the Pediatric BMT attending physician, Dr. Candido Joshua.      Shannon J. Schroetter, BENJA-  Pediatric Blood and Marrow Transplant Program  [facility]  Pager: [phone]  [facility] Phone: [phone]    BMT Attending Note:    Artemio Nino was seen and evaluated by me today.     Interval history: Over the past 24 hours, Artemio has had some loose stools, but this has not worsened, and the volume does not appear to be increasing.  The  nausea currently is not a major problem.  We continue to reduce the narcotics, and will be switching the meds to oral.  He has not been eating well, and we will start some marinol to encourage this.  He has not been having difficulty with respiratory issues, nor has pain been problematic.  We have not identified any new infectious concerns. I have reviewed changes and data in the status over the last 24 hours, including the vital signs, medications and lab results.  I assisted in formulating a plan, which was discussed amongst the BMT team.  This plan was also shared with the family, and I answered all questions to the best of my ability.      My care coordination activities today include oversight of planned lab studies, medication changes and discussion with BMT team-members including nursing.    The total amount of time spent in the care of Artemio Nino today was >40 minutes, at least 50% of which was counseling and coordination of care.    Candido Joshua MD  Professor, Dept. Of Pediatrics  Division of Blood and Marrow Transplantation

## 2018-11-25 NOTE — PLAN OF CARE
Problem: Patient Care Overview  Goal: Plan of Care/Patient Progress Review  Outcome: No Change  Isael was afebrile. VSS. LS clear on room air. Assessment done at 1000 per patient request. Pt c/o abdominal pain 6/10. Scheduled oxy given. No appetite. No nausea or vomiting. Good UOP. No PRNs or replacements given. Hourly rounding complete. Will notify MD of changes. Continue with POC.

## 2018-11-25 NOTE — PLAN OF CARE
Problem: Stem Cell/Bone Marrow Transplant (Adult)  Goal: Signs and Symptoms of Listed Potential Problems Will be Absent, Minimized or Managed (Stem Cell/Bone Marrow Transplant)  Signs and symptoms of listed potential problems will be absent, minimized or managed by discharge/transition of care (reference Stem Cell/Bone Marrow Transplant (Adult) CPG).   Outcome: No Change  AF. VSS. Lung sounds clear on RA. No complaints of pain or nausea. TPN/lipids infusing. Voiding, no stool reported overnight. No replacements needed. Continue with POC.

## 2018-11-25 NOTE — PLAN OF CARE
Problem: Stem Cell/Bone Marrow Transplant (Adult)  Goal: Signs and Symptoms of Listed Potential Problems Will be Absent, Minimized or Managed (Stem Cell/Bone Marrow Transplant)  Signs and symptoms of listed potential problems will be absent, minimized or managed by discharge/transition of care (reference Stem Cell/Bone Marrow Transplant (Adult) CPG).   Outcome: No Change  Febrile, vital signs stable. Denies pain. Received Ativan prn dose for nausea. Has new rash on L side neck, Dr Alvaro Alcaraz in to see patient. Received Atarax IV for itching and Nena cream to area. Patient has been compliant with every 6 hour schedule for vital signs and medications. Plan is to stick to this schedule for meds and cares.

## 2018-11-26 ENCOUNTER — ANESTHESIA EVENT (OUTPATIENT)
Dept: PEDIATRICS | Facility: CLINIC | Age: 18
End: 2018-11-26

## 2018-11-26 LAB
ALBUMIN SERPL-MCNC: 3.1 G/DL (ref 3.4–5)
ALP SERPL-CCNC: 74 U/L (ref 40–150)
ALT SERPL W P-5'-P-CCNC: 32 U/L (ref 0–70)
ANION GAP SERPL CALCULATED.3IONS-SCNC: 7 MMOL/L (ref 3–14)
AST SERPL W P-5'-P-CCNC: 24 U/L (ref 0–45)
BACTERIA SPEC CULT: NORMAL
BASOPHILS # BLD AUTO: 0 10E9/L (ref 0–0.2)
BASOPHILS NFR BLD AUTO: 0 %
BILIRUB DIRECT SERPL-MCNC: 0.1 MG/DL (ref 0–0.2)
BILIRUB SERPL-MCNC: 0.4 MG/DL (ref 0.2–1.3)
BLD PROD TYP BPU: NORMAL
BLD PROD TYP BPU: NORMAL
BLD UNIT ID BPU: 0
BLOOD PRODUCT CODE: NORMAL
BPU ID: NORMAL
BUN SERPL-MCNC: 20 MG/DL (ref 7–30)
CA-I BLD-MCNC: 4.9 MG/DL (ref 4.4–5.2)
CALCIUM SERPL-MCNC: 8.2 MG/DL (ref 8.5–10.1)
CHLORIDE SERPL-SCNC: 109 MMOL/L (ref 94–109)
CO2 SERPL-SCNC: 24 MMOL/L (ref 20–32)
CREAT SERPL-MCNC: 0.69 MG/DL (ref 0.66–1.25)
DACRYOCYTES BLD QL SMEAR: SLIGHT
DIFFERENTIAL METHOD BLD: ABNORMAL
EOSINOPHIL # BLD AUTO: 0 10E9/L (ref 0–0.7)
EOSINOPHIL NFR BLD AUTO: 0 %
ERYTHROCYTE [DISTWIDTH] IN BLOOD BY AUTOMATED COUNT: 12.7 % (ref 10–15)
FIBRINOGEN PPP-MCNC: 384 MG/DL (ref 200–420)
GFR SERPL CREATININE-BSD FRML MDRD: >90 ML/MIN/1.7M2
GLUCOSE SERPL-MCNC: 163 MG/DL (ref 70–99)
HCT VFR BLD AUTO: 23.1 % (ref 40–53)
HGB BLD-MCNC: 8.1 G/DL (ref 13.3–17.7)
INR PPP: 1.13 (ref 0.86–1.14)
LDH SERPL L TO P-CCNC: 226 U/L (ref 85–227)
LYMPHOCYTES # BLD AUTO: 0.1 10E9/L (ref 0.8–5.3)
LYMPHOCYTES NFR BLD AUTO: 2 %
MAGNESIUM SERPL-MCNC: 2.1 MG/DL (ref 1.6–2.3)
MCH RBC QN AUTO: 29.6 PG (ref 26.5–33)
MCHC RBC AUTO-ENTMCNC: 35.1 G/DL (ref 31.5–36.5)
MCV RBC AUTO: 84 FL (ref 78–100)
METAMYELOCYTES # BLD: 0.3 10E9/L
METAMYELOCYTES NFR BLD MANUAL: 8.7 %
MONOCYTES # BLD AUTO: 1.2 10E9/L (ref 0–1.3)
MONOCYTES NFR BLD AUTO: 30.1 %
NEUTROPHILS # BLD AUTO: 2.4 10E9/L (ref 1.6–8.3)
NEUTROPHILS NFR BLD AUTO: 59.2 %
NUM BPU REQUESTED: 1
PHOSPHATE SERPL-MCNC: 3.9 MG/DL (ref 2.5–4.5)
PLATELET # BLD AUTO: 27 10E9/L (ref 150–450)
PLATELET # BLD EST: ABNORMAL 10*3/UL
POIKILOCYTOSIS BLD QL SMEAR: SLIGHT
POTASSIUM SERPL-SCNC: 3.5 MMOL/L (ref 3.4–5.3)
PREALB SERPL IA-MCNC: 32 MG/DL (ref 15–45)
PROT SERPL-MCNC: 6.2 G/DL (ref 6.8–8.8)
RBC # BLD AUTO: 2.74 10E12/L (ref 4.4–5.9)
SODIUM SERPL-SCNC: 140 MMOL/L (ref 133–144)
SPECIMEN SOURCE: NORMAL
TACROLIMUS BLD-MCNC: 9 UG/L (ref 5–15)
TME LAST DOSE: NORMAL H
TRANSFUSION STATUS PATIENT QL: NORMAL
TRANSFUSION STATUS PATIENT QL: NORMAL
WBC # BLD AUTO: 4 10E9/L (ref 4–11)

## 2018-11-26 PROCEDURE — 25000132 ZZH RX MED GY IP 250 OP 250 PS 637: Performed by: NURSE PRACTITIONER

## 2018-11-26 PROCEDURE — 25000125 ZZHC RX 250: Performed by: PEDIATRICS

## 2018-11-26 PROCEDURE — 83615 LACTATE (LD) (LDH) ENZYME: CPT | Performed by: PEDIATRICS

## 2018-11-26 PROCEDURE — 20000002 ZZH R&B BMT INTERMEDIATE

## 2018-11-26 PROCEDURE — 84100 ASSAY OF PHOSPHORUS: CPT | Performed by: PEDIATRICS

## 2018-11-26 PROCEDURE — 25000131 ZZH RX MED GY IP 250 OP 636 PS 637: Performed by: NURSE PRACTITIONER

## 2018-11-26 PROCEDURE — 85384 FIBRINOGEN ACTIVITY: CPT | Performed by: PEDIATRICS

## 2018-11-26 PROCEDURE — 85610 PROTHROMBIN TIME: CPT | Performed by: PEDIATRICS

## 2018-11-26 PROCEDURE — 83735 ASSAY OF MAGNESIUM: CPT | Performed by: PEDIATRICS

## 2018-11-26 PROCEDURE — 84134 ASSAY OF PREALBUMIN: CPT | Performed by: PEDIATRICS

## 2018-11-26 PROCEDURE — 82330 ASSAY OF CALCIUM: CPT | Performed by: PEDIATRICS

## 2018-11-26 PROCEDURE — P9037 PLATE PHERES LEUKOREDU IRRAD: HCPCS | Performed by: PEDIATRICS

## 2018-11-26 PROCEDURE — 80053 COMPREHEN METABOLIC PANEL: CPT | Performed by: PEDIATRICS

## 2018-11-26 PROCEDURE — 82248 BILIRUBIN DIRECT: CPT | Performed by: PEDIATRICS

## 2018-11-26 PROCEDURE — 25800025 ZZH RX 258: Performed by: NURSE PRACTITIONER

## 2018-11-26 PROCEDURE — 85025 COMPLETE CBC W/AUTO DIFF WBC: CPT | Performed by: PEDIATRICS

## 2018-11-26 PROCEDURE — 80197 ASSAY OF TACROLIMUS: CPT | Performed by: NURSE PRACTITIONER

## 2018-11-26 PROCEDURE — 80299 QUANTITATIVE ASSAY DRUG: CPT | Performed by: PEDIATRICS

## 2018-11-26 RX ORDER — HYDROXYZINE HCL 10 MG/5 ML
0.5 SOLUTION, ORAL ORAL EVERY 6 HOURS PRN
Status: DISCONTINUED | OUTPATIENT
Start: 2018-11-26 | End: 2018-11-27 | Stop reason: HOSPADM

## 2018-11-26 RX ORDER — SULFAMETHOXAZOLE/TRIMETHOPRIM 800-160 MG
1 TABLET ORAL
Qty: 20 TABLET | Refills: 3 | Status: ON HOLD | OUTPATIENT
Start: 2018-12-03 | End: 2020-08-14

## 2018-11-26 RX ADMIN — LORAZEPAM 1 MG: 0.5 TABLET ORAL at 10:26

## 2018-11-26 RX ADMIN — GRANISETRON HYDROCHLORIDE 1 MG: 1 TABLET, FILM COATED ORAL at 10:26

## 2018-11-26 RX ADMIN — MELATONIN TAB 3 MG 3 MG: 3 TAB at 21:05

## 2018-11-26 RX ADMIN — PHYTONADIONE: 1 INJECTION, EMULSION INTRAMUSCULAR; INTRAVENOUS; SUBCUTANEOUS at 20:59

## 2018-11-26 RX ADMIN — Medication: at 21:08

## 2018-11-26 RX ADMIN — PANTOPRAZOLE SODIUM 40 MG: 40 TABLET, DELAYED RELEASE ORAL at 10:26

## 2018-11-26 RX ADMIN — VALACYCLOVIR HYDROCHLORIDE 1000 MG: 500 TABLET, FILM COATED ORAL at 10:26

## 2018-11-26 RX ADMIN — Medication 0.8 MG: at 10:26

## 2018-11-26 RX ADMIN — OXYCODONE HYDROCHLORIDE 5 MG: 5 TABLET ORAL at 21:05

## 2018-11-26 RX ADMIN — PANTOPRAZOLE SODIUM 40 MG: 40 TABLET, DELAYED RELEASE ORAL at 21:06

## 2018-11-26 RX ADMIN — DRONABINOL 2.5 MG: 2.5 CAPSULE ORAL at 15:38

## 2018-11-26 RX ADMIN — OXYCODONE HYDROCHLORIDE 5 MG: 5 TABLET ORAL at 15:38

## 2018-11-26 RX ADMIN — Medication 0.8 MG: at 21:15

## 2018-11-26 RX ADMIN — GRANISETRON HYDROCHLORIDE 1 MG: 1 TABLET, FILM COATED ORAL at 21:05

## 2018-11-26 RX ADMIN — MYCOPHENOLATE MOFETIL 1000 MG: 500 TABLET, FILM COATED ORAL at 15:38

## 2018-11-26 RX ADMIN — MYCOPHENOLATE MOFETIL 1000 MG: 500 TABLET, FILM COATED ORAL at 10:26

## 2018-11-26 RX ADMIN — DEXTROSE AND SODIUM CHLORIDE: 5; 450 INJECTION, SOLUTION INTRAVENOUS at 20:59

## 2018-11-26 RX ADMIN — VORICONAZOLE 300 MG: 200 TABLET, FILM COATED ORAL at 21:04

## 2018-11-26 RX ADMIN — OXYCODONE HYDROCHLORIDE 5 MG: 5 TABLET ORAL at 10:26

## 2018-11-26 RX ADMIN — VALACYCLOVIR HYDROCHLORIDE 1000 MG: 500 TABLET, FILM COATED ORAL at 21:15

## 2018-11-26 RX ADMIN — I.V. FAT EMULSION 240 ML: 20 EMULSION INTRAVENOUS at 20:59

## 2018-11-26 RX ADMIN — DRONABINOL 2.5 MG: 2.5 CAPSULE ORAL at 10:25

## 2018-11-26 RX ADMIN — MYCOPHENOLATE MOFETIL 1000 MG: 500 TABLET, FILM COATED ORAL at 21:04

## 2018-11-26 RX ADMIN — VALACYCLOVIR HYDROCHLORIDE 1000 MG: 500 TABLET, FILM COATED ORAL at 15:38

## 2018-11-26 RX ADMIN — LORAZEPAM 1 MG: 0.5 TABLET ORAL at 21:05

## 2018-11-26 RX ADMIN — VORICONAZOLE 300 MG: 200 TABLET, FILM COATED ORAL at 10:25

## 2018-11-26 ASSESSMENT — ENCOUNTER SYMPTOMS: DYSRHYTHMIAS: 1

## 2018-11-26 NOTE — PROGRESS NOTES
"SPIRITUAL HEALTH SERVICES  SPIRITUAL ASSESSMENT Progress Note  [facility] ([facility] Peds BMT    REFERRAL SOURCE: family request through bedside RN    PRIMARY FOCUS:     Support for coping    Lengthy visit with Artemio's mom based on her request for help with \"really bad anxiety\" about discharge.    ILLNESS CIRCUMSTANCES:     Context of Serious Illness/Symptom(s) - Artemio is a 18 year old male with ALL, here for BMT on 11/2/18 and pending discharge 11/27/18.  His mother was requesting support for coping with her anxiety related to discharge & challenges that may arise.   Artemio is an adult and able to make his own medical decisions at this time. He has requested not to have  visits for himself, so my conversation with his mother took place outside the room.      Resources for Support - Mom, Mervat had relocated back to [place], but came back to [place] to assist with the transplant process.  She shared that she has a few friends residing in the city, but did not seem confident that they were people she could call for emotional support.  She talked about her partner Jada as very helpful, but he is not here.  I encouraged her to seek out professional support for the anxiety/grief that she is experiencing.    DISTRESS:     Emotional/Spiritual/Existential Distress - Mervat endorses a range of worries about what could happen to Artemio if his cares are not done properly and in a timely fashion.  She also appears to be working through normal challenges of \"letting go\" of adult children, which is complicated by Artemio's cancer diagnosis and her increased worry for him.  Mervat requested spiritual support and asked to be able to \"vent.\"      Mervat elaborated on a number of family stories not directly related to Isael's care, but which appear to exacerbate her anxiety.  I attempted to return her attention her own self-care and the things that are within her control.      Latter-day Distress -Not " discussed.    Social/Cultural/Economic Distress - Not discussed.      SENSE-MAKING:    Goals of Care - Mervat is deeply concerned about Artemio's long-term recovery, which is her ultimate goal.    PLAN: No planned follow-up, though I remain available at caregiver request.    Екатерина Maravilla M.Div.  Staff   Pager [phone]

## 2018-11-26 NOTE — PLAN OF CARE
Problem: Patient Care Overview  Goal: Plan of Care/Patient Progress Review  PT: Cancel - attempt x2 patient sleeping both times. Will continue per POC. Based on chart review patient to be discharged to home soon. In previous PT session discussed with patient ways to increase activity level at home upon discharge and ensured patient had clear understanding of lab values in regards to resistant exercises.

## 2018-11-26 NOTE — PLAN OF CARE
Problem: Patient Care Overview  Goal: Plan of Care/Patient Progress Review  Outcome: No Change  TMAX 99.8.  Lungs clear sating good on RA.  BPs 80-90s/50-60s, OVSS.  No complaints of pain, continues on scheduled oxy.  No s/sx of nausea.  Brought in sheet for pt to record exact PO intake per provider's request, said he ate 3 orange juices and 2 fruit cups throughout the day.  Stool x1 during shift.  No UO recorded, but pt stated he had flushed it down the toilet.  Continues to apply creams to neck rash.  Hourly rounding completed.  Mom currently present at bedside.  Continue with POC.

## 2018-11-26 NOTE — PROGRESS NOTES
Pediatric BMT Daily Progress Note    Interval Events: Remains afebrile. Nausea and pain seemingly better controlled. New rash on neck noted 11/24 continues to use prn hydroxyzine IV as needed 2/2 to pruritis. No use of IV ativan or bendryl.  Tolerating medication weans and conversion to oral medications.      Review of Systems: Pertinent positives include those mentioned in interval events. A complete review of systems was performed and is otherwise negative.      Medications:  Please see MAR    Physical Exam:  Temp:  [98.1  F (36.7  C)-99.8  F (37.7  C)] 99.4  F (37.4  C)  Pulse:  [] 103  Resp:  [14-16] 16  BP: ()/(55-61) 93/55  SpO2:  [97 %-99 %] 97 %  I/O last 3 completed shifts:  In: 2070 [P.O.:354; I.V.:170]  Out: 616 [Urine:450; Stool:166]     GEN: Awake, lying in bed cooperative with assessment, provides answers to questions though brief and one-worded. NAD  HEENT: Normocephalic, atraumatic, alopecia, sclera clear, MMM, lesions healing, nares patent without discharge    CARD: Heart regular rate and rhythm. No murmurs, rubs or gallops.Cap refill 2 seconds.  RESP: Lungs clear in upper lobes, diminished in bases, shallow respirations.  No increased work of breathing, crackles or wheezes.   ABD: Non-distended, non-tender, bowel sounds present  EXTREM: No edema noted  SKIN: Confluent  erythematous rash on neck.     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 He is currently BMT Transplant Date: BMT Txp 11/2/2018 (24 days).      Nausea and pain improved. Stool output appears to be minimal by charting. New rash noted on neck over weekend confluent and pruritic. Continues to take meds orally with minimal oral intake.       BMT:  # High risk B cell ALL (CNS negative) + JAK2 activation/BMT:  Latest bone marrow biopsy (10/15) MRD 0.006%; Ruxolitinib continued until admission.  Conditioning per 2015-29: TBI days -4 through -1 followed by matched sibling donor transplant on 11/2   - Engrafted day +15  - bone marrow biopsy scheduled for 11/27 (will need to be NPO).NPO orders placed at 0300am      # Risk for GVHD: Post-transplant Cytoxan on days +3 and +4 complete   - Continue MMF through day +35, converted to PO 11/25   - Continue tacrolimus (goal 5-10). 11/24: 9.8, dose decreased as rising with voriconazole addition.   - Repeat level 11/26 remains pending     FEN/Renal:  # Risk for malnutrition: continue TPN/IL, currently cycled to 12 hours  - weight was  trending down and now with slight increase (lowest he's been at 59.6kg)  - starting eligio counts  - starting marinol BID for appetite stimulation, increase as needed  - dietician following      # Risk for electrolyte abnormalities:  - Current IV fluids contain potassium, removed KCL today to assess need to supplement in TPN at discharge.  Monitor K+ levels in outpatient setting.  - Check daily electrolytes. Given cardiac history (WPW), keep K>3.5, Mg>1.8, iCa nml.   - Mag and K sliding scale       # Risk for renal dysfunction and fluid overload:  - monitor I/O's and daily weights       # Risk for aHUS/TA-TMA:    - monitor LDH qMonday/Thursday post-BMT until outpatient then weekly through day +100, to date within acceptable range  - monitor urine protein/creatinine qTuesday through day +100, to date within acceptable range      Pulmonary:  # Risk for pulmonary toxicity secondary to chemotherapy:   - monitor respiratory function per unit routine      # Rhinorrhea: 3 mos history, mild. Sinus CT (10/22) with findings of mucosal thickening without evidence of active infection.  RVP negative.      # Pneumomediastinum: finding upon work up Chest CT (10/22), asymptomatic. Per radiology, likely benign, perhaps transient.   - Patient instructed to notify care team  with any SOB, dyspnea, or remarkable cough  - none over last several days      Cardiovascular:  # Aaron-Parkinson-White Syndrome: diagnosed upon syncope in 02/2018. Asymptomatic, no interventions to date. Work up EKG c/w WPW, sinus bradycardia at 49 bpm. Work up ECHO normal with EF 68%.   Cardiology visit with Dr. Plummer (10/22): Holter revealed 10 PACs with no evidence of AV block. Normal Echocardiogram with EF 68%. Isael has hx of HR in high 30s with sleep.  - Avoid catecholinergics as able to avoid tachycardia  - Maintain normal iCa+, mag > 1.8, K > 3.5      # Risk for hypertension secondary to medications:   - no need for PRNs at this time as has been running low      Heme:   # Pancytopenia secondary to chemotherapy:   - Transfuse for hemoglobin < 8, platelets < 10,000 as now off of defibrotide, no premedications  - GCSF now PRN for ANC < 1000      Infectious Disease:  # Risk for infection given immunocompromised status:   Active: Afebrile,   - central line exit site cultured 11/20: NGTD    Prophylaxis:                                                                                                                        - viral prophylaxis: CMV neg (donor CMV positive), HSV positive. Valtrex. Weekly testing 11/18 negative  - fungal prophylaxis: high risk - Voriconazole  11/26 level pending  - bacterial prophylaxis: Will start Bactrim at day +28 if counts appropriate        # C.difficile colitis: Per documentation over last 24 hours, stools appear to be improved. Artemio reports has small stools every time he goes to the bathroom, loose and watery, but not large volume  - stop oral vanco 11/25 and monitor stools  - continue to encourage to stool in [facility]      GI:   # Nausea management: improved overall  - Scheduled medications: Kytril Q12H, ativan q 12 both prior to medications, both now oral  - Will need ativan wean as outpatient  - PRN medications: Ativan (also for anxiety), benadryl      # Risk for VOD:  especially high risk  previous Inotuzumab therapy.  Defibrotide ppx stopped today, 11/22  - stopped ursodiol 11/26, and monitor LFTs    # Hemorrhoids: preparation H prn, has not complained of related discomfort in several days.    ENT:  # Epistaxis: occasional drips, nasal mucosa dry  - Ayr gel PRN  - Vaseline to nares      Neuro:  # Mucositis/pain:   - continues with abdominal cramping but a little better than previous. Pain is sporadic and intense but self -resloving  - convert morphine to oxycodone TID with available PRN, will continue to wean as able  - encourage warm packs, moving around, and monitoring if happens around stooling or not  - Integrative therapy     # Pruritis: resolved in anal area, now with rash on neck  - benadryl and hydroxyzine PRN    Derm:  # Skin breakdown, perianal area: no current concerns, and last assessed by Kale and Dr. Alcaraz on 11/22    # Insomnia: Artemio having trouble sleeping, with abdominal cramping and overnight interruptions. Discussed the necessity of his care here which he understands.   Agreed to schedule of the following:  - vitals at 04, 10, 16, 22 timed with his medications  - weight @ 10  - taking his medications within 30-45 minutes of RN brining them in so they don't have to come back repeatedly to remind him  - labs at 04  - expressed to Artemio that schedule would not be perfect and he states understanding. Also discussed that it is his responsibility to do his part and comply  - continue melatonin 3 mg QHS    Social  - actively involved in creating an effective plan of care post discharge, parents are not  and communication appear challenging. Completed multidisciplinary team meeting with family on 11/21 with social work and RN coordinator  -Mother has concern of historical non-compliance with medication and cares and this continues to be addressed/reinforced by team as able      The above plan of care was developed by and communicated to  me by the Pediatric BMT attending physician, Dr. Candido Joshua.      Lilia Prabhakar MSN, CPNP-  Pediatric Blood and Marrow Transplant Program  [facility] [phone]  Pager [phone]  BMT Attending Note:    Artemio Nino was seen and evaluated by me today.     Interval history: Over the past 24 hours, we have continued to decrease his narcotics, and the nausea remains under control, and  the stool volume does not appear to be increasing.  We continue to switch the meds to oral, which has been well tolerated thus far.  He will have his scheduled bone marrow done tomorrow.  He has not been having difficulty with respiratory issues.  We have not identified any new infectious concerns. I have reviewed changes and data in the status over the last 24 hours, including the vital signs, medications and lab results.  I assisted in formulating a plan, which was discussed amongst the BMT team.  This plan was also shared with the family, and I answered all questions to the best of my ability.      My care coordination activities today include oversight of planned lab studies, medication changes and discussion with BMT team-members including nursing.    The total amount of time spent in the care of Artemio Nino today was >40 minutes, at least 50% of which was counseling and coordination of care.    Candido Joshua MD  Professor, Dept. Of Pediatrics  Division of Blood and Marrow Transplantation

## 2018-11-26 NOTE — PHARMACY - DISCHARGE MEDICATION RECONCILIATION AND EDUCATION
Discharge medication review for this patient completed.  Pharmacist provided medication teaching for discharge with a focus on new medications/dose changes.  The discharge medication list was reviewed with Isael and the following points were discussed, as applicable: Name, description, purpose, dose/strength, measurement of liquid medications, strategies for giving medications to children, special storage requirements, common side effects, food/medications to avoid, action to be taken if dose is missed, when to call MD, safe disposal of unused medications and how to obtain refills.    Isael was engaged during teaching and verbalized understanding.    All medications were in hand during teaching except Kytril d/t ordering in for tomorrow. Medication(s) placed in fridge and pyxismedication room, awaiting discharge.    The following medications were discussed:  Current Discharge Medication List      START taking these medications    Details   acetaminophen (TYLENOL) 325 MG tablet Take 2 tablets (650 mg) by mouth every 4 hours as needed for mild pain (discomfort with fever, fever of 102.5 or greater.)  Qty: 100 tablet, Refills: 3    Associated Diagnoses: Generalized pain      dronabinol (MARINOL) 2.5 MG capsule Take 1 capsule (2.5 mg) by mouth 2 times daily  Qty: 60 capsule, Refills: 0    Associated Diagnoses: Nausea; Anorexia      emollient (VANICREAM) cream Apply topically 4 times daily  Qty: 453 g, Refills: 0    Associated Diagnoses: Rash and nonspecific skin eruption      granisetron (KYTRIL) 1 MG tablet Take 1 tablet (1 mg) by mouth every 12 hours  Qty: 60 tablet, Refills: 3    Associated Diagnoses: Nausea      LORazepam (ATIVAN) 1 MG tablet Take 1 tablet (1 mg) by mouth 2 times daily  Qty: 60 tablet, Refills: 0    Associated Diagnoses: Nausea      melatonin 3 MG tablet Take 1 tablet (3 mg) by mouth At Bedtime  Qty: 30 tablet, Refills: 3    Associated Diagnoses: Insomnia, unspecified type      mycophenolate  (GENERIC EQUIVALENT) 500 MG tablet Take 2 tablets (1,000 mg) by mouth 3 times daily for 11 days  Qty: 66 tablet, Refills: 0    Associated Diagnoses: S/P allogeneic bone marrow transplant (H); At 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      pantoprazole (PROTONIX) 40 MG EC tablet Take 1 tablet (40 mg) by mouth 2 times daily  Qty: 30 tablet, Refills: 3    Associated Diagnoses: S/P allogeneic bone marrow transplant (H); Nausea; On total parenteral nutrition      tacrolimus (GENERIC EQUIVALENT) 1 mg/mL suspension Take 0.8 mLs (0.8 mg) by mouth every 12 hours  Qty: 50 mL, Refills: 3    Associated Diagnoses: S/P allogeneic bone marrow transplant (H); At 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      valACYclovir (VALTREX) 1000 mg tablet Take 1 tablet (1,000 mg) by mouth 3 times daily  Qty: 90 tablet, Refills: 3    Associated Diagnoses: S/P allogeneic bone marrow transplant (H); At risk for infection      !! voriconazole (VFEND) 200 MG tablet Take two 50 mg tablets and one 200 mg table twice daily. Total dose is 300 mg twice daily.  Qty: 60 tablet, Refills: 3    Associated Diagnoses: S/P allogeneic bone marrow transplant (H); At risk for infection      !! voriconazole (VFEND) 50 MG tablet Take two 50 mg tablets and one 200 mg table twice daily. Total dose is 300 mg twice daily.  Qty: 120 tablet, Refills: 3    Associated Diagnoses: S/P allogeneic bone marrow transplant (H); At risk for infection       !! - Potential duplicate medications found. Please discuss with provider.      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

## 2018-11-26 NOTE — PLAN OF CARE
"Problem: Patient Care Overview  Goal: Plan of Care/Patient Progress Review  Outcome: No Change  Isael was afebrile. VSS. LS clear on room air. No stool. No nausea or vomiting. Ate one fruit cup today. Good UOP. Urine is dark travis. No PRns or replacements given. Mom (sabas) was at bedside for most of the morning. Mom expressed to this RN that she was \"going crazy with anxiety\" about Artemio going home. She asked to talk specifically to a . Екатерина was paged and mom noted feeling a little better. Dad is now at bedside. Hourly rounding complete. Will notify MD of changes. Continue with POC.       "

## 2018-11-26 NOTE — PLAN OF CARE
Problem: Stem Cell/Bone Marrow Transplant (Adult)  Goal: Signs and Symptoms of Listed Potential Problems Will be Absent, Minimized or Managed (Stem Cell/Bone Marrow Transplant)  Signs and symptoms of listed potential problems will be absent, minimized or managed by discharge/transition of care (reference Stem Cell/Bone Marrow Transplant (Adult) CPG).   Outcome: No Change  AF. VSS. Lung sounds clear on RA. No complaints of pain or nausea. TPN/lipids infusing. Not saving urine or stool output. No replacements needed. Appeared to sleep well. Continue with POC.

## 2018-11-27 ENCOUNTER — ONCOLOGY VISIT (OUTPATIENT)
Dept: TRANSPLANT | Facility: CLINIC | Age: 18
End: 2018-11-27
Attending: NURSE PRACTITIONER
Payer: COMMERCIAL

## 2018-11-27 ENCOUNTER — HOME INFUSION (PRE-WILLOW HOME INFUSION) (OUTPATIENT)
Dept: PHARMACY | Facility: CLINIC | Age: 18
End: 2018-11-27

## 2018-11-27 ENCOUNTER — ANESTHESIA (OUTPATIENT)
Dept: PEDIATRICS | Facility: CLINIC | Age: 18
End: 2018-11-27
Payer: COMMERCIAL

## 2018-11-27 VITALS
WEIGHT: 134.26 LBS | HEIGHT: 69 IN | OXYGEN SATURATION: 100 % | HEART RATE: 67 BPM | TEMPERATURE: 97.4 F | DIASTOLIC BLOOD PRESSURE: 67 MMHG | BODY MASS INDEX: 19.89 KG/M2 | RESPIRATION RATE: 16 BRPM | SYSTOLIC BLOOD PRESSURE: 88 MMHG

## 2018-11-27 DIAGNOSIS — C91.01 ACUTE LYMPHOBLASTIC LEUKEMIA (ALL) IN REMISSION (H): Primary | ICD-10-CM

## 2018-11-27 LAB
ABO + RH BLD: ABNORMAL
ABO + RH BLD: ABNORMAL
ANION GAP SERPL CALCULATED.3IONS-SCNC: 7 MMOL/L (ref 3–14)
ANISOCYTOSIS BLD QL SMEAR: SLIGHT
BASOPHILS # BLD AUTO: 0 10E9/L (ref 0–0.2)
BASOPHILS NFR BLD AUTO: 0 %
BLD GP AB INVEST PLASRBC-IMP: ABNORMAL
BLD GP AB SCN SERPL QL ELUTION: ABNORMAL
BLD GP AB SCN SERPL QL: ABNORMAL
BLD PROD TYP BPU: ABNORMAL
BLD PROD TYP BPU: NORMAL
BLD UNIT ID BPU: 0
BLOOD BANK CMNT PATIENT-IMP: ABNORMAL
BLOOD BANK CMNT PATIENT-IMP: ABNORMAL
BLOOD PRODUCT CODE: NORMAL
BPU ID: NORMAL
BUN SERPL-MCNC: 20 MG/DL (ref 7–30)
CALCIUM SERPL-MCNC: 8.2 MG/DL (ref 8.5–10.1)
CHLORIDE SERPL-SCNC: 108 MMOL/L (ref 94–109)
CO2 SERPL-SCNC: 26 MMOL/L (ref 20–32)
COPATH REPORT: NORMAL
CREAT SERPL-MCNC: 0.71 MG/DL (ref 0.66–1.25)
DAT C3-SP REAG RBC QL: ABNORMAL
DAT IGG-SP REAG RBC-IMP: ABNORMAL
DAT POLY-SP REAG RBC QL: ABNORMAL
DIFFERENTIAL METHOD BLD: ABNORMAL
EOSINOPHIL # BLD AUTO: 0 10E9/L (ref 0–0.7)
EOSINOPHIL NFR BLD AUTO: 0 %
ERYTHROCYTE [DISTWIDTH] IN BLOOD BY AUTOMATED COUNT: 12.6 % (ref 10–15)
GFR SERPL CREATININE-BSD FRML MDRD: >90 ML/MIN/1.7M2
GLUCOSE SERPL-MCNC: 146 MG/DL (ref 70–99)
HCT VFR BLD AUTO: 22.1 % (ref 40–53)
HGB BLD-MCNC: 7.9 G/DL (ref 13.3–17.7)
LYMPHOCYTES # BLD AUTO: 0.1 10E9/L (ref 0.8–5.3)
LYMPHOCYTES NFR BLD AUTO: 3.6 %
MCH RBC QN AUTO: 30.2 PG (ref 26.5–33)
MCHC RBC AUTO-ENTMCNC: 35.7 G/DL (ref 31.5–36.5)
MCV RBC AUTO: 84 FL (ref 78–100)
METAMYELOCYTES # BLD: 0.1 10E9/L
METAMYELOCYTES NFR BLD MANUAL: 2.7 %
MICROCYTES BLD QL SMEAR: PRESENT
MONOCYTES # BLD AUTO: 1.1 10E9/L (ref 0–1.3)
MONOCYTES NFR BLD AUTO: 34.2 %
NEUTROPHILS # BLD AUTO: 2 10E9/L (ref 1.6–8.3)
NEUTROPHILS NFR BLD AUTO: 59.5 %
NRBC # BLD AUTO: 0 10*3/UL
NRBC BLD AUTO-RTO: 1 /100
NUM BPU REQUESTED: 1
PLATELET # BLD AUTO: 56 10E9/L (ref 150–450)
PLATELET # BLD EST: ABNORMAL 10*3/UL
POIKILOCYTOSIS BLD QL SMEAR: SLIGHT
POTASSIUM SERPL-SCNC: 3.5 MMOL/L (ref 3.4–5.3)
RBC # BLD AUTO: 2.62 10E12/L (ref 4.4–5.9)
RBC INCLUSIONS BLD: SLIGHT
SODIUM SERPL-SCNC: 141 MMOL/L (ref 133–144)
SPECIMEN EXP DATE BLD: ABNORMAL
TRANSFUSION STATUS PATIENT QL: NORMAL
TRANSFUSION STATUS PATIENT QL: NORMAL
VORICONAZOLE SERPL-MCNC: 0.9 UG/ML (ref 1–5.5)
WBC # BLD AUTO: 3.3 10E9/L (ref 4–11)

## 2018-11-27 PROCEDURE — 88237 TISSUE CULTURE BONE MARROW: CPT | Performed by: NURSE PRACTITIONER

## 2018-11-27 PROCEDURE — 88264 CHROMOSOME ANALYSIS 20-25: CPT | Performed by: NURSE PRACTITIONER

## 2018-11-27 PROCEDURE — 80048 BASIC METABOLIC PNL TOTAL CA: CPT | Performed by: NURSE PRACTITIONER

## 2018-11-27 PROCEDURE — 88185 FLOWCYTOMETRY/TC ADD-ON: CPT | Performed by: NURSE PRACTITIONER

## 2018-11-27 PROCEDURE — 40000165 ZZH STATISTIC POST-PROCEDURE RECOVERY CARE: Performed by: NURSE PRACTITIONER

## 2018-11-27 PROCEDURE — 88313 SPECIAL STAINS GROUP 2: CPT | Performed by: NURSE PRACTITIONER

## 2018-11-27 PROCEDURE — 40000611 ZZHCL STATISTIC MORPHOLOGY W/INTERP HEMEPATH TC 85060: Performed by: NURSE PRACTITIONER

## 2018-11-27 PROCEDURE — 40000567 ZZHCL STATISTIC BONE MARROW ASP PERF TC ACL/CSC 38220: Performed by: NURSE PRACTITIONER

## 2018-11-27 PROCEDURE — 27210134 ZZH KIT BIOPSY BONE MARROW: Performed by: NURSE PRACTITIONER

## 2018-11-27 PROCEDURE — 88305 TISSUE EXAM BY PATHOLOGIST: CPT | Performed by: NURSE PRACTITIONER

## 2018-11-27 PROCEDURE — 07DR3ZX EXTRACTION OF ILIAC BONE MARROW, PERCUTANEOUS APPROACH, DIAGNOSTIC: ICD-10-PCS | Performed by: NURSE PRACTITIONER

## 2018-11-27 PROCEDURE — 25000132 ZZH RX MED GY IP 250 OP 250 PS 637: Performed by: NURSE PRACTITIONER

## 2018-11-27 PROCEDURE — 88275 CYTOGENETICS 100-300: CPT | Performed by: NURSE PRACTITIONER

## 2018-11-27 PROCEDURE — 38222 DX BONE MARROW BX & ASPIR: CPT | Performed by: NURSE PRACTITIONER

## 2018-11-27 PROCEDURE — 25000125 ZZHC RX 250: Performed by: NURSE PRACTITIONER

## 2018-11-27 PROCEDURE — 40000564 ZZHCL STATISTIC BONE MARROW CORE PERF TC ACL/CSC 38221: Performed by: NURSE PRACTITIONER

## 2018-11-27 PROCEDURE — 37000009 ZZH ANESTHESIA TECHNICAL FEE, EACH ADDTL 15 MIN: Performed by: NURSE PRACTITIONER

## 2018-11-27 PROCEDURE — 81267 CHIMERISM ANAL NO CELL SELEC: CPT | Performed by: NURSE PRACTITIONER

## 2018-11-27 PROCEDURE — 25000131 ZZH RX MED GY IP 250 OP 636 PS 637: Performed by: NURSE PRACTITIONER

## 2018-11-27 PROCEDURE — 88280 CHROMOSOME KARYOTYPE STUDY: CPT | Performed by: NURSE PRACTITIONER

## 2018-11-27 PROCEDURE — P9040 RBC LEUKOREDUCED IRRADIATED: HCPCS | Performed by: NURSE PRACTITIONER

## 2018-11-27 PROCEDURE — 37000008 ZZH ANESTHESIA TECHNICAL FEE, 1ST 30 MIN: Performed by: NURSE PRACTITIONER

## 2018-11-27 PROCEDURE — 88184 FLOWCYTOMETRY/ TC 1 MARKER: CPT | Performed by: NURSE PRACTITIONER

## 2018-11-27 PROCEDURE — 88161 CYTOPATH SMEAR OTHER SOURCE: CPT | Performed by: NURSE PRACTITIONER

## 2018-11-27 PROCEDURE — 85025 COMPLETE CBC W/AUTO DIFF WBC: CPT | Performed by: NURSE PRACTITIONER

## 2018-11-27 PROCEDURE — 87070 CULTURE OTHR SPECIMN AEROBIC: CPT | Performed by: NURSE PRACTITIONER

## 2018-11-27 PROCEDURE — 88271 CYTOGENETICS DNA PROBE: CPT | Performed by: NURSE PRACTITIONER

## 2018-11-27 PROCEDURE — 40000951 ZZHCL STATISTIC BONE MARROW INTERP TC 85097: Performed by: NURSE PRACTITIONER

## 2018-11-27 PROCEDURE — 25000128 H RX IP 250 OP 636: Performed by: NURSE ANESTHETIST, CERTIFIED REGISTERED

## 2018-11-27 PROCEDURE — 40001004 ZZHCL STATISTIC FLOW INT 9-15 ABY TC 88188: Performed by: NURSE PRACTITIONER

## 2018-11-27 PROCEDURE — 40001011 ZZH STATISTIC PRE-PROCEDURE NURSING ASSESSMENT: Performed by: NURSE PRACTITIONER

## 2018-11-27 PROCEDURE — 25000128 H RX IP 250 OP 636

## 2018-11-27 PROCEDURE — 00000161 ZZHCL STATISTIC H-SPHEME PROCESS B/S: Performed by: NURSE PRACTITIONER

## 2018-11-27 PROCEDURE — 88311 DECALCIFY TISSUE: CPT | Performed by: NURSE PRACTITIONER

## 2018-11-27 RX ORDER — SODIUM CHLORIDE, SODIUM LACTATE, POTASSIUM CHLORIDE, CALCIUM CHLORIDE 600; 310; 30; 20 MG/100ML; MG/100ML; MG/100ML; MG/100ML
INJECTION, SOLUTION INTRAVENOUS CONTINUOUS PRN
Status: DISCONTINUED | OUTPATIENT
Start: 2018-11-27 | End: 2018-11-27

## 2018-11-27 RX ORDER — PROPOFOL 10 MG/ML
INJECTION, EMULSION INTRAVENOUS CONTINUOUS PRN
Status: DISCONTINUED | OUTPATIENT
Start: 2018-11-27 | End: 2018-11-27

## 2018-11-27 RX ORDER — LIDOCAINE HYDROCHLORIDE 10 MG/ML
INJECTION, SOLUTION EPIDURAL; INFILTRATION; INTRACAUDAL; PERINEURAL
Status: DISCONTINUED
Start: 2018-11-27 | End: 2018-11-27 | Stop reason: HOSPADM

## 2018-11-27 RX ORDER — PROPOFOL 10 MG/ML
INJECTION, EMULSION INTRAVENOUS PRN
Status: DISCONTINUED | OUTPATIENT
Start: 2018-11-27 | End: 2018-11-27

## 2018-11-27 RX ORDER — HEPARIN SODIUM,PORCINE 10 UNIT/ML
3 VIAL (ML) INTRAVENOUS
Status: CANCELLED
Start: 2018-11-28

## 2018-11-27 RX ORDER — HEPARIN SODIUM (PORCINE) LOCK FLUSH IV SOLN 100 UNIT/ML 100 UNIT/ML
3 SOLUTION INTRAVENOUS
Status: CANCELLED
Start: 2018-11-28

## 2018-11-27 RX ORDER — HEPARIN SODIUM,PORCINE 10 UNIT/ML
VIAL (ML) INTRAVENOUS
Status: COMPLETED
Start: 2018-11-27 | End: 2018-11-27

## 2018-11-27 RX ORDER — ONDANSETRON 2 MG/ML
INJECTION INTRAMUSCULAR; INTRAVENOUS PRN
Status: DISCONTINUED | OUTPATIENT
Start: 2018-11-27 | End: 2018-11-27

## 2018-11-27 RX ORDER — DIAPER,BRIEF,INFANT-TODD,DISP
EACH MISCELLANEOUS
Qty: 56 G | Refills: 0 | Status: SHIPPED | OUTPATIENT
Start: 2018-11-27 | End: 2018-12-14

## 2018-11-27 RX ORDER — VORICONAZOLE 200 MG/1
TABLET, FILM COATED ORAL
Qty: 60 TABLET | Refills: 3 | Status: SHIPPED | OUTPATIENT
Start: 2018-11-27 | End: 2018-12-04

## 2018-11-27 RX ORDER — OXYCODONE HYDROCHLORIDE 5 MG/1
5 TABLET ORAL 3 TIMES DAILY
Qty: 40 TABLET | Refills: 0 | Status: SHIPPED | OUTPATIENT
Start: 2018-11-27 | End: 2018-11-30

## 2018-11-27 RX ORDER — HEPARIN SODIUM,PORCINE 10 UNIT/ML
5 VIAL (ML) INTRAVENOUS
Status: CANCELLED | OUTPATIENT
Start: 2018-11-28

## 2018-11-27 RX ORDER — FENTANYL CITRATE 50 UG/ML
INJECTION, SOLUTION INTRAMUSCULAR; INTRAVENOUS PRN
Status: DISCONTINUED | OUTPATIENT
Start: 2018-11-27 | End: 2018-11-27

## 2018-11-27 RX ORDER — HYDROXYZINE HCL 10 MG/5 ML
0.5 SOLUTION, ORAL ORAL EVERY 8 HOURS PRN
Qty: 1500 ML | Refills: 1 | Status: SHIPPED | OUTPATIENT
Start: 2018-11-27 | End: 2018-12-14

## 2018-11-27 RX ORDER — HEPARIN SODIUM,PORCINE 10 UNIT/ML
5 VIAL (ML) INTRAVENOUS
Status: CANCELLED
Start: 2018-11-28

## 2018-11-27 RX ORDER — VORICONAZOLE 50 MG/1
TABLET, FILM COATED ORAL
Qty: 180 TABLET | Refills: 3 | Status: SHIPPED | OUTPATIENT
Start: 2018-11-27 | End: 2018-12-04

## 2018-11-27 RX ADMIN — FENTANYL CITRATE 50 MCG: 50 INJECTION, SOLUTION INTRAMUSCULAR; INTRAVENOUS at 10:15

## 2018-11-27 RX ADMIN — Medication 0.8 MG: at 12:14

## 2018-11-27 RX ADMIN — SODIUM CHLORIDE, PRESERVATIVE FREE 3 ML: 5 INJECTION INTRAVENOUS at 11:28

## 2018-11-27 RX ADMIN — ONDANSETRON 4 MG: 2 INJECTION INTRAMUSCULAR; INTRAVENOUS at 10:15

## 2018-11-27 RX ADMIN — VALACYCLOVIR HYDROCHLORIDE 1000 MG: 500 TABLET, FILM COATED ORAL at 12:21

## 2018-11-27 RX ADMIN — HEPARIN, PORCINE (PF) 10 UNIT/ML INTRAVENOUS SYRINGE 3 ML: at 11:28

## 2018-11-27 RX ADMIN — PROPOFOL 250 MCG/KG/MIN: 10 INJECTION, EMULSION INTRAVENOUS at 10:15

## 2018-11-27 RX ADMIN — LORAZEPAM 1 MG: 0.5 TABLET ORAL at 12:15

## 2018-11-27 RX ADMIN — GRANISETRON HYDROCHLORIDE 1 MG: 1 TABLET, FILM COATED ORAL at 12:16

## 2018-11-27 RX ADMIN — DRONABINOL 2.5 MG: 2.5 CAPSULE ORAL at 12:16

## 2018-11-27 RX ADMIN — MYCOPHENOLATE MOFETIL 1000 MG: 500 TABLET, FILM COATED ORAL at 12:15

## 2018-11-27 RX ADMIN — SODIUM CHLORIDE, POTASSIUM CHLORIDE, SODIUM LACTATE AND CALCIUM CHLORIDE: 600; 310; 30; 20 INJECTION, SOLUTION INTRAVENOUS at 10:45

## 2018-11-27 RX ADMIN — SODIUM CHLORIDE, POTASSIUM CHLORIDE, SODIUM LACTATE AND CALCIUM CHLORIDE: 600; 310; 30; 20 INJECTION, SOLUTION INTRAVENOUS at 10:15

## 2018-11-27 RX ADMIN — OXYCODONE HYDROCHLORIDE 5 MG: 5 TABLET ORAL at 12:15

## 2018-11-27 RX ADMIN — PANTOPRAZOLE SODIUM 40 MG: 40 TABLET, DELAYED RELEASE ORAL at 12:16

## 2018-11-27 RX ADMIN — PROPOFOL 70 MG: 10 INJECTION, EMULSION INTRAVENOUS at 10:15

## 2018-11-27 ASSESSMENT — ENCOUNTER SYMPTOMS: APNEA: 1

## 2018-11-27 NOTE — DISCHARGE INSTRUCTIONS
BMT Pediatric Summary of Care    This note has data from a flowsheet    November 27, 2018 12:07 PM  Artemio Nino  MRN: [id number]    Discharge Date: 11/27/2018    BMT Primary Physician: Dr. Zavala   BMT Nurse Coordinator: aDria Jara     Discharge Diagnosis: S/P BMT    Discharge To: Home     Activity: Enteric with C.diff treatment completed treatment. DX on 11/12 with C.Diff  Infection, no longer symptomatic     Catheter Care: Galvan dressing scheduled to be change prior to discharge   Port-a-cath left sided and last access for heparin locking on 10/27     Vascular Access Device Protocol Per Policy  Supplies through Home [facility] (Please supply central line dressing kits for weekly dressing changes).  [facility]  Fax: [phone]  Ph: [phone]       IV Medications through home infusion: TPN     Nutrition: TPN/IL-see separate orders for formula    Blood Transfusions:  Transfuse if Hemoglobin < or equal 8 mg/dL  Red Blood Cell Order: 1 units, irradiated and leukoreduced   Transfuse if Platelet count < or equal 10  Platelet order: one adult dose, irradiated and leukoreduced  Transfusion Pre-meds:  No pre meds ordered on MAR, no documentation of pre-meds in his recent progress notes   History of significant antibody against RBC give type RBC    Outpatient Pharmacy:  G-CSF to be given in clinic: (dose) for ANC less than 1.0     Laboratory Tests:  At next clinic appointment (date: 11/28)  Hemogram (CBC) differential, platelet count  Magnesium  Comprehensive Metabolic Panel  Phosphorus  Tacrolimus     Support Services:  Physical Therapy Consult  (Evaluate and Treat)-Referral sent to BMT complex schedulers    Appointments:   BMT Clinic:  Arrive to clinic on 11/28 at 8:30 am for labs followed by appointment with Janee Olmos PA-C at 8:45am  Please hold your  morningTacrolimus dose on 11/28.   For a drug level at first appt 11/28    SNOW Rasmussen CNP

## 2018-11-27 NOTE — ANESTHESIA CARE TRANSFER NOTE
Patient: Artemio Nino    Procedure(s):  bone marrow biopsy    Diagnosis: Personal history of diseases of blood and blood-forming organs  Diagnosis Additional Information: No value filed.    Anesthesia Type:   No value filed.     Note:  Airway :Nasal Cannula  Patient transferred to: Recovery  Comments: Transfer to patient room for recovery.  Monitors placed.  VSS noted.  Report to RN.  Handoff Report: Identifed the Patient, Identified the Reponsible Provider, Reviewed the pertinent medical history, Discussed the surgical course, Reviewed Intra-OP anesthesia mangement and issues during anesthesia, Set expectations for post-procedure period and Allowed opportunity for questions and acknowledgement of understanding      Vitals: (Last set prior to Anesthesia Care Transfer)    CRNA VITALS  11/27/2018 1028 - 11/27/2018 1058      11/27/2018             Pulse: 73    Ht Rate: 73    SpO2: 99 %                Electronically Signed By: SNOW GOLD CRNA  November 27, 2018  10:58 AM

## 2018-11-27 NOTE — PHARMACY-CONSULT NOTE
Discharge TPN Monitoring    Artemio's TPN formula, labs, and nutritional status were reviewed today and Artemio will be discharged on the following formula.   Discussed with Lilia López NP and communicated to [facility].     Artemio will return to clinic on Wednesday 11/28 for labs and reassessment.    The following reflects the current TPN formula:  Dosing Weight: 65.1 kg  Volume: 1392 ml, cycled over 12 hours  Protein: 98 g/day   Dextrose: 255 g  Lipids: 240 mL    Sodium: 50 mEq/day  Potassium:   10 mEq/day  Calcium: 5 mEq/day  Magnesium:  45 mEq/day  Phosphorus:  0 mmol/day  Chloride:Acetate ratio: 1:2  Trace elements with Selenium: standard  Multivitamins: standard  Vitamin K:  2.5 mg/day    Pharmacy will continue to follow.  Erica May, PharmD

## 2018-11-27 NOTE — PLAN OF CARE
Problem: Stem Cell/Bone Marrow Transplant (Adult)  Goal: Signs and Symptoms of Listed Potential Problems Will be Absent, Minimized or Managed (Stem Cell/Bone Marrow Transplant)  Signs and symptoms of listed potential problems will be absent, minimized or managed by discharge/transition of care (reference Stem Cell/Bone Marrow Transplant (Adult) CPG).   Outcome: No Change  Afebrile. VSS. Pt slept all night. Clustered cares at 0400.  Pt has been NPO since midnight.  RN confirmed with Artemio that he has set an alarm this morning for his shower prior to his procedure today. PRBC's infusing currently.  Hourly rounding completed. Continue with plan of care.

## 2018-11-27 NOTE — PHARMACY-PHARMACOTHERAPY NOTE
Voriconazole Monitoring Note   D: Current voriconazole dose: 300 mg PO BID  Voriconazole Level: 0.9   Goal Voriconazole trough level: 1.5-5 mg/L (treatment failure has been reported with levels <1.5; neuro- and hepato-toxicity seen with levels > 5.5).   A: Current trough level is below the desired range.   Significant drug interactions include: tacrolimus   P: Increase dose to 350 mg PO BID.  Recheck trough level 12/3.  Pharmacy team will continue to follow.  Margaret Kelly, PharmD

## 2018-11-27 NOTE — PHARMACY - DISCHARGE MEDICATION RECONCILIATION AND EDUCATION
Discharge medication review for this patient completed.  Pharmacist provided medication teaching for discharge with a focus on new medications/dose changes.  The discharge medication list was reviewed with Eboni & Cole, Tyler (terry) & Isael and the following points were discussed, as applicable: Name, description, purpose, dose/strength, duration of medications, measurement of liquid medications, strategies for giving medications to children, common side effects, food/medications to avoid, action to be taken if dose is missed, when to call MD and how to obtain refills.    All were engaged during teaching and verbalized understanding. Other pertinent information from teaching includes: Provided Medactionplan (emailed to Cole), thermometer, pillbox and syringe caps.    All meds given to Cole.    The following medications were discussed:  Current Discharge Medication List      START taking these medications    Details   acetaminophen (TYLENOL) 325 MG tablet Take 2 tablets (650 mg) by mouth every 4 hours as needed for mild pain (discomfort with fever, fever of 102.5 or greater.)  Qty: 100 tablet, Refills: 3    Associated Diagnoses: Generalized pain      dronabinol (MARINOL) 2.5 MG capsule Take 1 capsule (2.5 mg) by mouth 2 times daily  Qty: 60 capsule, Refills: 0    Associated Diagnoses: Nausea; Anorexia      emollient (VANICREAM) cream Apply topically 4 times daily  Qty: 453 g, Refills: 0    Associated Diagnoses: Rash and nonspecific skin eruption      granisetron (KYTRIL) 1 MG tablet Take 1 tablet (1 mg) by mouth every 12 hours  Qty: 60 tablet, Refills: 3    Associated Diagnoses: Nausea      hydrOXYzine (ATARAX) 10 MG/5ML syrup Take 12.5 mLs (25 mg) by mouth every 8 hours as needed for itching  Qty: 1500 mL, Refills: 1    Associated Diagnoses: 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); Bone marrow transplant candidate      LORazepam (ATIVAN) 1 MG tablet Take 1 tablet (1 mg) by mouth 2 times  daily  Qty: 60 tablet, Refills: 0    Associated Diagnoses: Nausea      melatonin 3 MG tablet Take 1 tablet (3 mg) by mouth At Bedtime  Qty: 30 tablet, Refills: 3    Associated Diagnoses: Insomnia, unspecified type      mycophenolate (GENERIC EQUIVALENT) 500 MG tablet Take 2 tablets (1,000 mg) by mouth 3 times daily for 11 days  Qty: 66 tablet, Refills: 0    Associated Diagnoses: S/P allogeneic bone marrow transplant (H); At 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      oxyCODONE (ROXICODONE) 5 MG tablet Take 1 tablet (5 mg) by mouth 3 times daily  Qty: 40 tablet, Refills: 0    Associated Diagnoses: 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); Bone marrow transplant candidate      pantoprazole (PROTONIX) 40 MG EC tablet Take 1 tablet (40 mg) by mouth 2 times daily  Qty: 30 tablet, Refills: 3    Associated Diagnoses: S/P allogeneic bone marrow transplant (H); Nausea; On total parenteral nutrition      tacrolimus (GENERIC EQUIVALENT) 1 mg/mL suspension Take 0.8 mLs (0.8 mg) by mouth every 12 hours  Qty: 50 mL, Refills: 3    Associated Diagnoses: S/P allogeneic bone marrow transplant (H); At 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      valACYclovir (VALTREX) 1000 mg tablet Take 1 tablet (1,000 mg) by mouth 3 times daily  Qty: 90 tablet, Refills: 3    Associated Diagnoses: S/P allogeneic bone marrow transplant (H); At risk for infection      !! voriconazole (VFEND) 200 MG tablet Take three 50 mg tablets and one 200 mg table twice daily. Total dose is 350 mg twice daily.  Qty: 60 tablet, Refills: 3    Associated Diagnoses: S/P allogeneic bone marrow transplant (H); At risk for infection      !! voriconazole (VFEND) 50 MG tablet Take three 50 mg tablets and one 200 mg table twice daily. Total dose is 350 mg twice daily.  Qty: 180 tablet, Refills: 3    Associated Diagnoses: S/P allogeneic bone marrow transplant (H); At risk for infection       !! - Potential duplicate medications found. Please discuss with provider.       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

## 2018-11-27 NOTE — MR AVS SNAPSHOT
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   After hours call [phone] and ask for the pediatric BMT physician to be paged for you.               Follow-ups after your visit        Who to contact     Please call your clinic at [phone] to:    Ask questions about your health    Make or cancel appointments    Discuss your medicines    Learn about your test results    Speak to your doctor            Additional Information About Your Visit        MyChart Information     [employer] is an electronic gateway that provides easy, online access to your medical records. With [employer], you can request a clinic appointment, read your test results, renew a prescription or communicate with your care team.     To sign up for [employer] visit the website at [email or web address]   You will be asked to enter the access code listed below, as well as some personal information. Please follow the directions to create your username and password.     Your access code is: [email or web address]  Expires: 2019  9:57 AM     Your access code will  in 90 days. If you need help or a new code, please contact your [facility] or call [phone] for assistance.        Care EveryWhere ID     This is your Care EveryWhere ID. This could be used by other organizations to access your  [facility] medical records  [id number]         Blood Pressure from Last 3 Encounters:   11/27/18 (!) 88/67   10/26/18 91/59   10/22/18 120/72    Weight from Last 3 Encounters:   11/26/18 60.9 kg (134 lb 4.2 oz)   10/26/18 62.8 kg (138 lb 7.2 oz)   10/22/18 62.9 kg (138 lb 10.7 oz)              Today, you had the following     No orders found for display         Today's Medication Changes      Notice     This visit is during an admission. Changes to the med list made in this visit will be reflected in the After Visit Summary of the admission.             Primary Care Provider Office Phone # Fax #    Viky Zavala [phone] [address] [facility] [address]        Equal Access to Services     CHARY MEJIAS : Hadkhurram weiner Soshady, waaxda luqadaha, qaybta kaalmada patricia, jessy palm . So [facility] [phone].    ATENCIÓN: Si habla español, tiene a london disposición servicios gratuitos de asistencia lingüística. Rita al [phone].    We comply with applicable federal civil rights laws and [place] laws. We do not discriminate on the basis of race, color, national origin, age, disability, sex, sexual orientation, or gender identity.            Thank you!     [facility] your care. Our goal is always to provide you with excellent care. Hearing back from our patients is one way we can continue to improve our services. Please take a few minutes to complete the written survey that you may receive in the mail after your visit with us. Thank you!             Your Updated Medication List - Protect others around you: Learn how to safely use, store and throw away your medicines at www.disposemymeds.org.      Notice     This visit is during an admission. Changes to the med list made in this visit will be reflected in the After Visit Summary of the admission.

## 2018-11-27 NOTE — PLAN OF CARE
Afebrile. VSS. Lungs clear. No stool this shift. No n/v/p, platelets transfused. Pt given bath wipes and stated he used them. Pt taking meds well. NPO at 0000 tonight. Hourly rounding completed. Continue POC

## 2018-11-27 NOTE — PROGRESS NOTES
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     Teaching Topic: First discharge post-transplant education    Person(s) involved in teaching: Patient, Mother, Father and Step-Mother    BMT Physician: Viky Zavala MD  Outpatient Nurse Coordinator: Daria Jara, RN  Inpatient Nurse Coordinator: Judy Quintanilla RN    Home [facility]fusion: [facility]  Completed Education Classes: CVC and TPN    Motivation Level  Asks Questions: Yes  Eager to Learn: Yes  Cooperative: Yes  Receptive (willing/able to accept information): Yes  Any cultural factors/Mandaeism beliefs that may influence understanding or compliance? No    Family demonstrates understanding of the following:   - Reason for the discharge teaching and treatment plan: Yes  - Knowledge of proper use of medications and conditions for which they are ordered (with special attention to potential side effects or drug interactions): Yes  - Which situations necessitate calling provider and whom to contact: Yes  - Proper use and care of (medical equipment, care aids, etc.): Yes  - Pain management techniques: Yes  - How and/when to access community resources: Yes    Infection Control  The family was educated on:  - Hand hygiene: Yes   - Central venous catheter care: Yes  - Signs and symptoms of infection: Yes  - Surgical procedure site care: NA  - Wound care: NA    Instructional Materials Used/Given: \"When to Call for Help,\" \"After You Leave the Hospital,\" and \"GVHD\" information provided to each family member.     Teaching Concerns Addressed: Met with family last week and a couple times this week to educate. All concerns addressed. Family will benefit from ongoing support with communication. Outpatient team to continue to educate, as needed.     Time Spent with Patient: 60+ minutes.  "

## 2018-11-27 NOTE — PROCEDURES
BMT Bone Marrow Biopsy Procedure Note  November 27, 2018 11:28 AM    DIAGNOSIS: ALL s/p BMT     PROCEDURE: Unilateral Bone Marrow Biopsy and Aspirate    SITE: Pediatric Sedation Suite    Patient s identification was positively verified by verbal identification and invasive procedure safety checklist was completed.  Informed consent was obtained. Following the administration of propofol as sedation, patient was placed in the  left lateral decubitus position and prepped and draped in a sterile manner.  Approximately 2 cc of 1% Lidocaine was used over the right posterior iliac spine.  Following this a 3 mm incision was made. Trephine bone marrow core and aspirates were obtained from the [facility]. Aspirates were sent for morphology, immunophenotyping, cytogenetics and molecular diagnostics VNTR.  A total of approximately 20 ml of marrow was aspirated.  Following this procedure a sterile dressing was applied to the bone marrow biopsy site. The patient was placed in the supine position to maintain pressure on the biopsy site. Post-procedure wound care instructions were given. The patient tolerated the procedure well with no known discomfort.    Complications: None    Procedure performed by: Jacqueline Fitzgerald NP and HANNAH Mendosa

## 2018-11-27 NOTE — SUMMARY OF CARE
BMT Pediatric Summary of Care    This note has data from a flowsheet    November 27, 2018 12:07 PM  Artemio Nino  MRN: [id number]    Discharge Date: 11/27/2018    BMT Primary Physician: Dr. Zavala   BMT Nurse Coordinator: Daria Jara     Discharge Diagnosis: S/P BMT    Discharge To: Home     Activity: Enteric with C.diff treatment completed treatment. DX on 11/12 with C.Diff  Infection, no longer symptomatic     Catheter Care: Galvan  Port-a-cath    Vascular Access Device Protocol Per Policy  Supplies through [facility] (Please supply central line dressing kits for weekly dressing changes).  [facility]  Fax: [phone]  Ph: [phone]       IV Medications through home infusion: TPN     Nutrition: TPN/IL-see separate orders for formula    Blood Transfusions:  Transfuse if Hemoglobin < or equal 8 mg/dL  Red Blood Cell Order: 1 units, irradiated and leukoreduced   Transfuse if Platelet count < or equal 10  Platelet order: one adult dose, irradiated and leukoreduced  Transfusion Pre-meds:  No pre meds ordered on MAR, no documentation of pre-meds in his recent progress notes   History of significant antibody against RBC give type RBC    Outpatient Pharmacy:  G-CSF to be given in clinic: (dose) for ANC less than 1.0     Laboratory Tests:  At next clinic appointment (date: 11/28)  Hemogram (CBC) differential, platelet count  Magnesium  Comprehensive Metabolic Panel  Phosphorus  Tacrolimus level    Support Services:  Physical Therapy Consult  (Evaluate and Treat referral placed   Appointments:   BMT Clinic (date, time, provider): Arrive to clinic on 11/28 at  8:30 am for labs followed by appointment with  Janee Olmos PA-C   Please hold your Tacrolimus for drug level at first appt 11/28    SNOW Rasmussen CNP

## 2018-11-27 NOTE — PLAN OF CARE
Problem: Patient Care Overview  Goal: Plan of Care/Patient Progress Review  Afebrile. VSS. LS clear. No c/o of pain or nausea during shift. Dressing change completed, incision site showed yellowy discharge, culture taken and MD notified. Pt discharged with parents to home care. RN teaching completed and AVS signed by patient. Discharge pharmacist and Nurse coordinator completed discharge teaching. All questions were answered, parents and family given instructions to follow up in clinic.

## 2018-11-27 NOTE — ANESTHESIA PREPROCEDURE EVALUATION
Anesthesia Pre-Procedure Evaluation    Patient: Artemio Nino   MRN:     [id number] Gender:   male   Age:    18 year old :      2000        Preoperative Diagnosis: Personal history of diseases of blood and blood-forming organs   Procedure(s):  bone marrow biopsy     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)      WPW (Aaron-Parkinson-White syndrome) 2018      Past Surgical History:   Procedure Laterality Date     INSERT CATHETER VASCULAR ACCESS DOUBLE LUMEN CHILD N/A 10/26/2018    Procedure: double lumen tunneled line placement;  Surgeon: Rex Valente MD;  Location: [facility]      O CLAVICLE LEFT Left     fracture with surgical repair and plate/screws     ORTHOPEDIC SURGERY      femur          Anesthesia Evaluation    ROS/Med Hx    No history of anesthetic complications  (-) malignant hyperthermia  Comments: Met with Artemio Nino who has been NPO. 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    This is a follow up for his HSCT for ALL done on .    He did well with GA in the past.       Cardiovascular Findings   (+) dysrhythmias,  Comments: WPW syndrome. 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  A  delay in             compatible RBCs may occur.Stem cell transplant             patient.  Give type O RBCs.   -- No Known Drug Allergies     Prescriptions Prior to Admission:  (DISCONTINUED) ruxolitinib (JAKAFI) 25 MG TABS tablet CHEMO, Current dose 65 mg twice daily, Disp: , Rfl:       Current Facility-Administered Medications:      acetaminophen (TYLENOL) tablet 650 mg, 10.3 mg/kg, Oral, Q4H PRN, Judy Malone APRN CNP, 650 mg at 11/09/18 0955     CERAVE CREA 1 Dose, 1 Dose, Topical, 4x Daily, Alvaro Alcaraz MD, 1 Dose at 11/26/18 2108     dextrose 5% and 0.45% NaCl infusion, , Intravenous, Continuous, Maribel, SNOW Tena CNP, Last Rate: 10 mL/hr at 11/26/18 2059     diphenhydrAMINE (BENADRYL) injection 25 mg, 25 mg, Intravenous, Q6H PRN, Schroetter, Shannon J, APRN CNP, 25 mg at 11/24/18 1030     dronabinol (MARINOL) capsule 2.5 mg, 2.5 mg, Oral, BID, Schroetter, Shannon J, APRN CNP, 2.5 mg at 11/26/18 1538     emollient (VANICREAM) cream, , Topical, 4x Daily, Alvaro Alcaraz MD     granisetron (KYTRIL) tablet 1 mg, 1 mg, Oral, Q12H SHERRIE, Schroetter, Shannon J, APRN CNP, 1 mg at 11/26/18 2105     heparin 100 UNIT/ML injection 5 mL, 5 mL, Intracatheter, Q28 Days, Schroetter, Shannon J, APRN CNP, 5 mL at 11/24/18 2056     heparin lock flush 10 UNIT/ML injection 2-4 mL, 2-4 mL, Intracatheter, Q24H, Candido Morales DO, 2 mL at 11/01/18 1333     heparin lock flush 10 UNIT/ML injection 2-4 mL, 2-4 mL, Intracatheter, Q1H PRN, Candido Morales DO, 3 mL at 11/25/18 1050     hydrOXYzine (ATARAX) syrup 25 mg, 0.5 mg/kg (Dosing Weight), Oral, Q6H PRN, Maribel, SNOW Tena CNP     hypromellose-dextran (ARTIFICAL TEARS) 0.1-0.3 % ophthalmic solution 1 drop, 1 drop, Both Eyes, Q1H PRN, Maribel, SNOW Tena CNP, 1 drop at 11/19/18 1213     lidocaine (PF) (XYLOCAINE) 1 % injection, , , ,      lidocaine 1 % 5 mL, 5 mL, Infiltration, Once, Jacqueline Fitzgerald, NP     lipids (INTRALIPID) 20 % infusion 240 mL, 240  mL, Intravenous, Q24H, Candido Joshua MD, Last Rate: 20 mL/hr at 11/26/18 2059, 240 mL at 11/26/18 2059     LORazepam (ATIVAN) tablet 0.5 mg, 0.5 mg, Oral, Q4H PRN, Schroetter, Shannon J, APRN CNP, 0.5 mg at 11/24/18 1724     LORazepam (ATIVAN) tablet 1 mg, 1 mg, Oral, BID, Schroetter, Shannon J, APRN CNP, 1 mg at 11/26/18 2105     magnesium sulfate 2 g in NS intermittent infusion ([employer] or [facility]), 2 g, Intravenous, Q4H PRN, Candido Morales DO, 2 g at 11/16/18 0253     melatonin tablet 3 mg, 3 mg, Oral, At Bedtime, Schroetter, Shannon J, APRN CNP, 3 mg at 11/26/18 2105     mycophenolate (GENERIC EQUIVALENT) tablet 1,000 mg, 1,000 mg, Oral, TID, Schroetter, Shannon J, APRN CNP, 1,000 mg at 11/26/18 2104     naloxone (NARCAN) injection 0.1-0.4 mg, 0.1-0.4 mg, Intravenous, Q2 Min PRN, Viky Zavala MD     oxyCODONE (ROXICODONE) tablet 5 mg, 5 mg, Oral, TID, Schroetter, Shannon J, APRN CNP, 5 mg at 11/26/18 2105     oxyCODONE (ROXICODONE) tablet 5 mg, 5 mg, Oral, Q4H PRN, Schroetter, Shannon J, APRN CNP     pantoprazole (PROTONIX) EC tablet 40 mg, 40 mg, Oral, BID, Schroetter, Shannon J, APRN CNP, 40 mg at 11/26/18 2106     parenteral nutrition - ADULT compounded formula CYCLE, , CENTRAL LINE IV, CYCLE *use admin instructions if needed, Candido Joshua MD, Last Rate: 66 mL/hr at 11/26/18 2059     potassium chloride CENTRAL LINE infusion PEDS/NICU 10 mEq, 10 mEq, Intravenous, Q1H PRN, Candido Morales DO, 10 mEq at 11/07/18 1843     Potassium Medication Instruction, , Does not apply, Continuous PRN, Judy Malone APRN CNP     sodium chloride (OCEAN) 0.65 % nasal spray 1 spray, 1 spray, Both Nostrils, Q1H PRN, Jazmín Cabezas MD, 1 spray at 11/03/18 0022     sodium chloride (PF) 0.9% PF flush 0.2-10 mL, 0.2-10 mL, Intracatheter, Q5 Min PRN, Candido Morales DO     (START ON 12/3/2018) sulfamethoxazole-trimethoprim (BACTRIM/SEPTRA) 400-80 MG per tablet 160 mg,  2.5 mg/kg (Treatment Plan Recorded), Oral, Q Mon Tues BID, Schroetter, Shannon J, APRN CNP     tacrolimus (GENERIC EQUIVALENT) suspension 0.8 mg, 0.8 mg, Oral, BID IS, Schroetter, Shannon J, APRN CNP, 0.8 mg at 11/26/18 2115     valACYclovir (VALTREX) tablet 1,000 mg, 1,000 mg, Oral, TID, Schroetter, Shannon J, APRN CNP, 1,000 mg at 11/26/18 2115     voriconazole (VFEND) 300 mg, 300 mg, Oral, Q12H SHERRIE, Schroetter, Shannon J, APRN CNP, 300 mg at 11/26/18 2104     witch hazel-glycerin (TUCKS) pad, , Topical, Q2H PRN, Lisa Workman NP          PHYSICAL EXAM:   Mental Status/Neuro: A/A/O   Airway: Facies: Feasible  Mallampati: I  Mouth/Opening: Full  TM distance: > 6 cm  Neck ROM: Full   Respiratory: Auscultation: CTAB     Resp. Rate: Normal     Resp. Effort: Normal      CV: Rhythm: Regular  Rate: Age appropriate  Heart: Normal Sounds   Comments:      Dental:  Dental Comments: stable                  Lab Results   Component Value Date    WBC 4.0 11/26/2018    HGB 8.1 (L) 11/26/2018    HCT 23.1 (L) 11/26/2018    PLT 27 (LL) 11/26/2018     11/26/2018    POTASSIUM 3.5 11/26/2018    CHLORIDE 109 11/26/2018    CO2 24 11/26/2018    BUN 20 11/26/2018    CR 0.69 11/26/2018     (H) 11/26/2018    ELSA 8.2 (L) 11/26/2018    PHOS 3.9 11/26/2018    MAG 2.1 11/26/2018    ALBUMIN 3.1 (L) 11/26/2018    PROTTOTAL 6.2 (L) 11/26/2018    ALT 32 11/26/2018    AST 24 11/26/2018    ALKPHOS 74 11/26/2018    BILITOTAL 0.4 11/26/2018    PTT 31 11/01/2018    INR 1.13 11/26/2018    FIBR 384 11/26/2018         Preop Vitals  BP Readings from Last 3 Encounters:   11/26/18 (P) 91/52   10/26/18 91/59   10/22/18 120/72    Pulse Readings from Last 3 Encounters:   11/26/18 (P) 86   10/22/18 61   10/22/18 61      Resp Readings from Last 3 Encounters:   11/26/18 (P) 18   10/26/18 11   10/22/18 18    SpO2 Readings from Last 3 Encounters:   11/26/18 (P) 94%   10/26/18 98%   10/22/18 97%      Temp Readings from Last 1 Encounters:   11/26/18  "(P) 36.7  C (98.1  F) ((P) Axillary)    Ht Readings from Last 1 Encounters:   10/28/18 1.74 m (5' 8.5\")      Wt Readings from Last 1 Encounters:   11/26/18 60.9 kg (134 lb 4.2 oz)    Estimated body mass index is 20.11 kg/(m^2) as calculated from the following:    Height as of this encounter: 1.74 m (5' 8.5\").    Weight as of this encounter: 60.9 kg (134 lb 4.2 oz).     LDA:  Port A Cath Single Left Chest wall (Active)   Access Date 10/27/18 11/26/2018  7:00 PM   Gauge/Length 22 gauge;3/4 inch 11/24/2018  9:00 PM   Site Assessment [facility] 11/26/2018  7:00 PM   De-Access Date 10/24/18 11/8/2018 12:00 PM   Date to be Reflushed 12/15/18 11/26/2018  7:00 PM   Number of days:       CVC Double Lumen 10/26/18 Right Internal jugular (Active)   Site Assessment [facility] 11/26/2018  7:00 PM   Extravasation? No 11/25/2018 10:00 AM   Dressing Intervention Chlorhexidine sponge;Transparent 11/25/2018  4:00 AM   Dressing Change Due 11/27/18 11/26/2018 10:00 AM   CVC Lumen Assessment Red;Purple 11/26/2018 10:00 AM   Number of days:31       Airway - Adult/Peds (Active)   Number of days:31          Assessment:   ASA SCORE: 3    NPO Status: > 2 hours since completed Clear Liquids; > 6 hours since completed Solid Foods   Documentation: H&P complete; Preop Testing complete     Tobacco Use:  NO Active use of Tobacco/UNKNOWN Tobacco use status     Plan:   Anes. Type:  General      Induction:  IV (Standard)   Airway: Native Airway   Access/Monitoring: CVL/Port present   Maintenance: Propofol; IV   Emergence: Procedure Site   Logistics: Same Day Surgery     Postop Pain/Sedation Strategy:  Standard-Options: Opioids PRN     PONV Management:  Adult Risk Factors:, Non-Smoker, Postop Opioids  Prevention: Propofol Infusion; Ondansetron     CONSENT: Direct conversation   Plan and risks discussed with: Patient   Blood Products: Consent Deferred (Minimal Blood Loss)     Comments for Plan/Consent:  Artemio requests GA. Procedures and risks explained. He " understood and consented. Qs answered.     Low platelet count- concern for Biopsy.                     Ivelisse Tavarez MD

## 2018-11-27 NOTE — ANESTHESIA POSTPROCEDURE EVALUATION
Anesthesia POST Procedure Evaluation    Patient: Artemio Nino   MRN:     [id number] Gender:   male   Age:    18 year old :      2000        Preoperative Diagnosis: Personal history of diseases of blood and blood-forming organs   Procedure(s):  bone marrow biopsy   Postop Comments: none       Anesthesia Type:  General    Reportable Event: NO     PAIN: Uncomplicated   Sign Out status: Comfortable, Well controlled pain     PONV: No PONV   Sign Out status:  No Nausea or Vomiting     Neuro/Psych: Uneventful perioperative course   Sign Out Status: Preoperative baseline; Age appropriate mentation     Airway/Resp.: Uneventful perioperative course   Sign Out Status: Airway Device present     CV: Uneventful perioperative course   Sign Out status: Appropriate BP and perfusion indices; Appropriate HR/Rhythm     Disposition:   Sign Out in:  Peds sedation  Disposition: he will return to his medical mares.  Recovery Course: Uneventful  Follow-Up: Not required     Comments/Narrative:  Recovering satisfactorily; strong; breathing well oriented; no complaints or complications; comfortable.            Last Anesthesia Record Vitals:  CRNA VITALS  2018 1028 - 2018 1128      2018             NIBP: (!)  88/50    Pulse: 73    Temp: 36.4  C (97.5  F)    SpO2: 99 %    Resp Rate (observed): 22    EKG: Sinus rhythm          Last PACU/Preop Vitals:  Vitals:    18 1107 18 1115 18 1124   BP:  (!) 88/67    Pulse: 67 72 67   Resp: 18 16 16   Temp: 36.1  C (97  F)  36.3  C (97.4  F)   SpO2: 100% 99% 100%         Electronically Signed By: Matthew Strickland MD, 2018, 11:30 AM

## 2018-11-27 NOTE — PROGRESS NOTES
Infusion Therapy-[facility] Nurse Liaison-Informational Meeting    Met with Parents in family room. Pt is expected to discharge today. Pt nor family has never done Home Infusion before, but is willing to learn.   Provided info on [facility] Services, Including-Administration methods, RN visits, RPH/RN on call 24/7, supplies, and delivery process, storage of medications,& refrigerating medications upon arrival form .  Educated on how to contact [facility]. Pt in agreement with plan and willing and able to learn. Pt stated they are will be comfortable doing infusion in home environment.  Parents have been to [facility] for TPN teaching.   Will continue to follow until DC for any changes or additional needs   SNV: for TPN SOC tonight  DELIVERY: To [facility] address in [facility]: DL non valved     Carline Boyer, [facility]-Nurse Liaison  Pager:  [phone]  Cell:  [phone]  Email to text:  [email or web address]  [email or web address]

## 2018-11-28 ENCOUNTER — HOME INFUSION (PRE-WILLOW HOME INFUSION) (OUTPATIENT)
Dept: PHARMACY | Facility: CLINIC | Age: 18
End: 2018-11-28

## 2018-11-28 ENCOUNTER — ALLIED HEALTH/NURSE VISIT (OUTPATIENT)
Dept: CARE COORDINATION | Facility: CLINIC | Age: 18
End: 2018-11-28

## 2018-11-28 ENCOUNTER — ONCOLOGY VISIT (OUTPATIENT)
Dept: TRANSPLANT | Facility: CLINIC | Age: 18
End: 2018-11-28
Attending: PHYSICIAN ASSISTANT
Payer: COMMERCIAL

## 2018-11-28 VITALS
DIASTOLIC BLOOD PRESSURE: 67 MMHG | OXYGEN SATURATION: 100 % | TEMPERATURE: 98.2 F | SYSTOLIC BLOOD PRESSURE: 117 MMHG | WEIGHT: 139.55 LBS | BODY MASS INDEX: 20.91 KG/M2 | RESPIRATION RATE: 21 BRPM | HEART RATE: 102 BPM

## 2018-11-28 DIAGNOSIS — Z91.89 AT RISK FOR INFECTION: ICD-10-CM

## 2018-11-28 DIAGNOSIS — Z71.9 ENCOUNTER FOR COUNSELING: Primary | ICD-10-CM

## 2018-11-28 DIAGNOSIS — Z76.82 BONE MARROW TRANSPLANT CANDIDATE: ICD-10-CM

## 2018-11-28 DIAGNOSIS — C91.01 ACUTE LYMPHOBLASTIC LEUKEMIA (ALL) IN REMISSION (H): ICD-10-CM

## 2018-11-28 LAB
ALBUMIN SERPL-MCNC: 3.5 G/DL (ref 3.4–5)
ALP SERPL-CCNC: 82 U/L (ref 40–150)
ALT SERPL W P-5'-P-CCNC: 36 U/L (ref 0–70)
ANION GAP SERPL CALCULATED.3IONS-SCNC: 8 MMOL/L (ref 3–14)
AST SERPL W P-5'-P-CCNC: 34 U/L (ref 0–45)
BASOPHILS # BLD AUTO: 0 10E9/L (ref 0–0.2)
BASOPHILS NFR BLD AUTO: 0 %
BILIRUB SERPL-MCNC: 0.4 MG/DL (ref 0.2–1.3)
BUN SERPL-MCNC: 19 MG/DL (ref 7–30)
CALCIUM SERPL-MCNC: 8.4 MG/DL (ref 8.5–10.1)
CHLORIDE SERPL-SCNC: 107 MMOL/L (ref 94–109)
CO2 SERPL-SCNC: 27 MMOL/L (ref 20–32)
COPATH REPORT: NORMAL
COPATH REPORT: NORMAL
CREAT SERPL-MCNC: 0.6 MG/DL (ref 0.66–1.25)
DIFFERENTIAL METHOD BLD: ABNORMAL
EOSINOPHIL # BLD AUTO: 0 10E9/L (ref 0–0.7)
EOSINOPHIL NFR BLD AUTO: 0 %
ERYTHROCYTE [DISTWIDTH] IN BLOOD BY AUTOMATED COUNT: 12.8 % (ref 10–15)
GFR SERPL CREATININE-BSD FRML MDRD: >90 ML/MIN/1.7M2
GLUCOSE SERPL-MCNC: 112 MG/DL (ref 70–99)
HCT VFR BLD AUTO: 27.7 % (ref 40–53)
HGB BLD-MCNC: 9.9 G/DL (ref 13.3–17.7)
LYMPHOCYTES # BLD AUTO: 0.3 10E9/L (ref 0.8–5.3)
LYMPHOCYTES NFR BLD AUTO: 6.1 %
MAGNESIUM SERPL-MCNC: 1.9 MG/DL (ref 1.6–2.3)
MCH RBC QN AUTO: 29.7 PG (ref 26.5–33)
MCHC RBC AUTO-ENTMCNC: 35.7 G/DL (ref 31.5–36.5)
MCV RBC AUTO: 83 FL (ref 78–100)
METAMYELOCYTES # BLD: 0 10E9/L
METAMYELOCYTES NFR BLD MANUAL: 0.9 %
MONOCYTES # BLD AUTO: 1.5 10E9/L (ref 0–1.3)
MONOCYTES NFR BLD AUTO: 31.6 %
NEUTROPHILS # BLD AUTO: 2.8 10E9/L (ref 1.6–8.3)
NEUTROPHILS NFR BLD AUTO: 61.4 %
PHOSPHATE SERPL-MCNC: 4.5 MG/DL (ref 2.5–4.5)
PLATELET # BLD AUTO: 69 10E9/L (ref 150–450)
PLATELET # BLD EST: ABNORMAL 10*3/UL
POLYCHROMASIA BLD QL SMEAR: SLIGHT
POTASSIUM SERPL-SCNC: 3.8 MMOL/L (ref 3.4–5.3)
PROT SERPL-MCNC: 6.7 G/DL (ref 6.8–8.8)
RBC # BLD AUTO: 3.33 10E12/L (ref 4.4–5.9)
SODIUM SERPL-SCNC: 142 MMOL/L (ref 133–144)
TACROLIMUS BLD-MCNC: 7.5 UG/L (ref 5–15)
TME LAST DOSE: NORMAL H
WBC # BLD AUTO: 4.6 10E9/L (ref 4–11)

## 2018-11-28 PROCEDURE — 83735 ASSAY OF MAGNESIUM: CPT | Performed by: NURSE PRACTITIONER

## 2018-11-28 PROCEDURE — 80197 ASSAY OF TACROLIMUS: CPT | Performed by: NURSE PRACTITIONER

## 2018-11-28 PROCEDURE — G0463 HOSPITAL OUTPT CLINIC VISIT: HCPCS

## 2018-11-28 PROCEDURE — 36592 COLLECT BLOOD FROM PICC: CPT | Performed by: NURSE PRACTITIONER

## 2018-11-28 PROCEDURE — 85025 COMPLETE CBC W/AUTO DIFF WBC: CPT | Performed by: NURSE PRACTITIONER

## 2018-11-28 PROCEDURE — 84100 ASSAY OF PHOSPHORUS: CPT | Performed by: NURSE PRACTITIONER

## 2018-11-28 PROCEDURE — 80053 COMPREHEN METABOLIC PANEL: CPT | Performed by: NURSE PRACTITIONER

## 2018-11-28 ASSESSMENT — PAIN SCALES - GENERAL: PAINLEVEL: NO PAIN (0)

## 2018-11-28 NOTE — PHARMACY-IMMUNOSUPPRESSION MONITORING
Tacrolimus Monitoring Note    Current dose = 0.8 mg PO Q12H  Tacrolimus level = 7.5       Goal trough level = 5-10 mcg/L.      Current trough level is within the desired range.      The patient is currently receiving medications that can significantly interact with Tacrolimus, and they are: voriconazole.    Plan: Continue current regimen    Recheck trough level in 2-3 days.  Pharmacy Team will continue to follow.    Andre MakD

## 2018-11-28 NOTE — PHARMACY-CONSULT NOTE
Discharge TPN Monitoring     Artemio's TPN formula, labs, and nutritional status were reviewed today and Artemio will be discharged on the following formula.   Discussed with HANNAH Mendosa and communicated to [facility].     Artemio will return to clinic on Friday, 11/30 for labs and reassessment.     The following reflects the current TPN formula:  Dosing Weight: 65.1 kg  Volume: 1392 ml, cycled over 12 hours  Protein: 98 g/day   Dextrose: 255 g  Lipids: 240 mL     Sodium: 50 mEq/day  Potassium:   10 mEq/day  Calcium: 5 mEq/day  Magnesium:  45 mEq/day  Phosphorus:  0 mmol/day  Chloride:Acetate ratio: 1:2  Trace elements with Selenium: standard  Multivitamins: standard  Vitamin K:  2.5 mg/day     Pharmacy will continue to follow.  Francisca Culp, PharmD

## 2018-11-28 NOTE — PATIENT INSTRUCTIONS
Return to [facility] for labs and exam with Janee Olmos on 11/30-- 0815 labs and 0845 appointment. Please hold tacro prior to visit for blood drug level, and take this medication after level obtained.    Infusion needs: none    Patient has PICC, Central line, CVC line, to be drawn off of per lab.     Medication changes:   - pick-up Melatonin Rx in Outpatient Pharmacy (must pay out of pocket), or okay to buy over the counter-- 3 mg by mouth nightly  - okay to leave marinol outside of fridge for indefinite amount of time (3 months), just be sure to not store in or near warmth/heat to melt capsule  - use 2.5 mg oxycodone every 4-6 hours as needed for pain related to biopsy site  - change biopsy bandage this afternoon  - TPN to be delivered today    Care plan changes: none    Contact information  During business hours (7:30am-4:30pm):   To leave a non-urgent voicemail: call triage line [phone]    To call for time-sensitive needs or concerns : call clinic  [phone]    Evenings after 4:30pm, weekends, and holidays:   For any needs or concerns: call for BMT fellow at [phone] in the case of an emergency    Thank you!   Scheduled with Tita Olmos on 11/3/2018 for labs at 8:15am and exam at 8:45am as of 11/29/2018 at 8:45am JE

## 2018-11-28 NOTE — PROGRESS NOTES
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 He overall has done well and is engrafted without signs of GvHD. He experienced anorexia necessitating TPN reliance, C diff colitis, nausea, and rash, all of which are resolved or improving. He discharged last evening and returns to clinic this morning for labwork and follow-up with his parents.    Isael reports that his first night out of the hospital went fine. He underwent his bone marrow biopsy yesterday as planned, and is reporting some discomfort today. He endorses it as being no more than typical after a BMBx, though mother feels he seems to be more uncomfortable than usual. He was able to sleep last night without interruption, and cannot yet assess if his scheduled oxycodone is helping. His nausea remains well-controlled, taking kytril and ativan twice daily with his medications. He states his medication & TPN administration has gone well thus far at home. Rash over neck remains stable, has not yet begun applying hydrocortisone ointment as they did not receive it at discharge. PO intake remains relatively minimal, since discharge eating 2 fruit cups, an icee, and trialed a bite of pizza though strongly disliked it. Unable to assess if marinol is yet improving his appetite. Stools remain small volume and improved off of PO vanco. Parents have various questions regarding medications, all of which were addressed.     Review of Systems: Pertinent positives include those mentioned in interval events. A complete review of systems was performed and is otherwise negative.       Medications:  Please see MAR     Physical Exam:  Vital Signs for Peds 11/28/2018   SYSTOLIC 117   DIASTOLIC 67   PULSE 102   TEMPERATURE 98.2   RESPIRATIONS 21   WEIGHT (kg) 63.3 kg   HEIGHT (cm)    BMI    pain    O2 100   GEN:  Awake, sitting in exam room chair, alert, answers questions appropriately though brief and one-worded. NAD. Parents present.  HEENT: Normocephalic, atraumatic, alopecia, sclera clear, MMM, oral lesions healing, nares patent without discharge    CARD: Heart regular rate and rhythm. No murmurs, rubs or gallops. Cap refill 2 seconds.  RESP: Lungs clear in upper lobes, diminished in bases, shallow respirations.  No increased work of breathing, crackles or wheezes.   ABD: Non-distended, non-tender, bowel sounds present  EXTREM: No edema noted  SKIN: Confluent erythematous rash on neck, superficial excoriations present.      Labs:  Labs reviewed, pertinent findings BMP with BUN 19, Cr 0.60 ; CBC with WBC 4.6k (ANC 2.8), Hgb 9.9, Plts 69K     Assessment/Plan:   Artemio is a 18 year old male with VHR B cell ALL + JAK2 activation, admitted for matched sibling bone marrow transplant. He is currently BMT Transplant Date: BMT Txp 11/2/2018 (26 days).       Nausea and pain improved, stool output minimal. Rash stable over neck, minimally pruritic. Taking meds with good tolerance, transitioning to outpatient setting.       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  Conditioning per 2015-29: TBI days -4 through -1 followed by matched sibling donor transplant on 11/2   - Engrafted day +15, PB VNTR 100% donor, unseparated  - bone marrow biopsy 11/27, results pending      # Risk for GVHD: Post-transplant Cytoxan on days +3 and +4 complete   - Continue MMF through day +35  - Continue tacrolimus (goal 5-10). Repeat level pending today 11/28-- 7.5 no changes made. Repeat Friday 11/30.     FEN/Renal:  # Risk for malnutrition: continue TPN/IL, currently cycled to 12 hours. Small amount of PO intake.  - weight was trending down, now increasing (lowest he's been at 59.6kg)  - continue Kcal counts  - marinol BID for appetite  stimulation, increase as needed  - dietician following      # Risk for electrolyte abnormalities: stable today  - previous IV fluids contained potassium, now replaced in TPN  - Check daily electrolytes. Given cardiac history (WPW), keep K>3.5, Mg>1.8, iCa nml.   - Mag and K sliding scale       # Risk for renal dysfunction and fluid overload:  - monitor I/O's and daily weights       # Risk for aHUS/TA-TMA:    - monitor LDH qMonday/Thursday post-BMT until outpatient then weekly through day +100, to date within acceptable range  - monitor urine protein/creatinine qTuesday through day +100, to date within acceptable range      Pulmonary:  # Risk for pulmonary toxicity secondary to chemotherapy:   - monitor respiratory function per unit routine      # Rhinorrhea: 3 mos history, mild. Sinus CT (10/22) with findings of mucosal thickening without evidence of active infection. RVP negative.      # Pneumomediastinum: finding upon work up Chest CT (10/22), asymptomatic. Per radiology, likely benign, perhaps transient.   - Patient instructed to notify care team with any SOB, dyspnea, or remarkable cough  - none over last several days      Cardiovascular:  # Aaron-Parkinson-White Syndrome: diagnosed upon syncope in 02/2018. Asymptomatic, no interventions to date. Work up EKG c/w WPW, sinus bradycardia at 49 bpm. Work up ECHO normal with EF 68%.   Cardiology visit with Dr. Plummer (10/22): Holter revealed 10 PACs with no evidence of AV block. Normal Echocardiogram with EF 68%. Isael has hx of HR in high 30s with sleep.  - Avoid catecholinergics as able to avoid tachycardia  - Maintain normal iCa+, mag > 1.8, K > 3.5      # Risk for hypertension secondary to medications:   - no need for PRNs at this time as has been running low      Heme:   # Pancytopenia secondary to chemotherapy:   - Transfuse for hemoglobin < 8, platelets < 10,000 as now off of defibrotide, no premedications  - GCSF now PRN for ANC < 1000      Infectious  Disease:  # Risk for infection given immunocompromised status:   Active: Afebrile  - central line exit site cultured 11/20: NGTD     Prophylaxis:                                                                                                                        - viral prophylaxis: CMV neg (donor CMV positive), HSV positive. Valtrex. Weekly testing 11/18 negative  - fungal prophylaxis: high risk - Voriconazole  11/26 level subtherapeutic, dose increased, repeat 12/3  - bacterial prophylaxis: Will start Bactrim at day +28 if counts appropriate         # C.difficile colitis: stools appear to be improved, small volume and loose/watery  - discontinued oral vanco 11/25, monitor stools      GI:   # Nausea management: improved overall  - Scheduled medications: Kytril Q12H, ativan q 12 both prior to medications, both now oral  - Will need ativan wean as outpatient-- consider decreasing 11/30  - PRN medications: Ativan (also for anxiety), benadryl       # Risk for VOD: especially high risk-- previous Inotuzumab therapy.  Defibrotide ppx stopped 11/22  - stopped ursodiol 11/26, and monitor LFTs     # Hemorrhoids: preparation H prn, has not complained of related discomfort in several days.     ENT:  # Epistaxis: occasional drips, nasal mucosa dry  - Ayr gel PRN  - Vaseline to nares      Neuro:  # Mucositis/pain: generally improving/resolved, though today with discomfort related to BMBx  - continues with abdominal cramping though improving from previous. Pain is sporadic and intense but self -resolving  - oxycodone TID with available PRN, will continue to wean as able, consider dose decrease on 11/30  - encourage warm packs, moving around, and monitoring if abd discomfort happens around stooling or not  - Integrative therapy      # Pruritis: resolved in anal area, now with rash on neck  - benadryl and hydroxyzine PRN     Derm:  # Skin breakdown, perianal area: no current concerns, and last assessed by Kale and Dr. Alcaraz  on 11/22     # Insomnia: Artemio having trouble sleeping, with abdominal cramping and overnight interruptions. Discussed the necessity of his care here which he understands.   - improved with compliance with set schedule inpt, see previous notes for details  - continue melatonin 3 mg QHS     Social  - actively involved in creating an effective plan of care post discharge, parents are not  and communication appears challenging. Completed multidisciplinary team meeting with family on 11/21 with social work and RN coordinator  -Mother has concern of historical non-compliance with medication and cares and this continues to be addressed/reinforced by team as able    Disposition: RTC Friday as previously planned for labwork and provider visit    SEBAS Christian (Flesher), PA-C  Pediatric Blood and Marrow Transplant Program  [facility]  Pager: [phone]  Fax: [phone]

## 2018-11-28 NOTE — NURSING NOTE
Chief Complaint   Patient presents with     RECHECK     Patient is here today for ALL follow up     /67 (BP Location: Left arm, Patient Position: Fowlers, Cuff Size: Adult Regular)  Pulse 102  Temp 98.2  F (36.8  C) (Oral)  Resp 21  Wt 63.3 kg (139 lb 8.8 oz)  SpO2 100%  BMI 20.91 kg/m2    Donna Andrews LPN  November 28, 2018

## 2018-11-28 NOTE — MR AVS SNAPSHOT
After Visit Summary   11/28/2018    Artemio Nino    MRN: [id number]           Patient Information     Date Of Birth          2000        Visit Information        Provider Department      11/28/2018 4:12 PM [place], [facility] CASE MANAGEMENT        Today's Diagnoses     Encounter for counseling    -  1       Follow-ups after your visit        Your next 10 appointments already scheduled     Nov 30, 2018  8:45 AM CST   Ump Bmt Peds Anniversary Visit with Janee Olmos PA-C   Peds Blood and Marrow Transplant ([facility])    [place] [address]  [address] [place] [id number]   [phone]            Dec 01, 2018  8:00 AM CST   Ump Peds Infusion 180 with [facility] PEDS INFUSION CHAIR 6   Peds IV Infusion ([facility])    [place] [address] [address]   [phone]            Dec 03, 2018 10:30 AM CST   Ump Bmt Peds Return with Janee Olmos PA-C   Peds Blood and Marrow Transplant ([facility])    [place] [address]  [address] [place] [address]   [phone]            Dec 05, 2018  9:30 AM CST   Ump Bmt Peds Return with Candido Han PA-C   Peds Blood and Marrow Transplant ([facility])    [place] [address]  [address] [place] [address]   [phone]            Dec 05, 2018 10:30 AM CST   Peds Eval with Kamilah Bates, PT   [facility] - Outpatient ([facility])    [address] [place] [address] [address]  [facility] [address] [phone]            Dec 07, 2018 11:30 AM CST   Ump Bmt Peds Anniversary Visit with Viky Zavala MD   Peds Blood and Marrow Transplant ([facility])    [facility]  9th Floor  [address] [place] [address]   [phone]            Dec 14, 2018 10:00 AM CST    [facility] Bmt Peds Anniversary Visit with Viky Zavala MD   Peds Blood and Marrow Transplant ([facility])    [facility]  9th Floor  [address] [place] [address]   [phone]            Dec 21, 2018 11:00 AM CST   [facility] Bmt Peds Anniversary Visit with Viky Zavala MD   Peds Blood and Marrow Transplant ([facility])    [facility]  9th Floor  [address] [place] [address]   [phone]            Dec 28, 2018 10:00 AM CST   [facility] Bmt Peds Anniversary Visit with Viky Zavala MD   Peds Blood and Marrow Transplant ([facility])    [place] [address]  [address] [place] [address]   [phone]              Future tests that were ordered for you today     Open Future Orders        Priority Expected Expires Ordered    Tacrolimus level Routine 11/30/2018 11/28/2019 11/28/2018    CBC with platelets differential Routine 11/30/2018 5/27/2019 11/28/2018    Basic metabolic panel Routine 11/30/2018 5/27/2019 11/28/2018    Magnesium Routine 11/30/2018 5/27/2019 11/28/2018    Phosphorus Routine 11/30/2018 11/28/2019 11/28/2018    CMV DNA quantification Routine 11/30/2018 5/27/2019 11/28/2018            Who to contact     If you have questions or need follow up information about today's clinic visit or your schedule please contact  CASE MANAGEMENT directly at No information on file..  Normal or non-critical lab and imaging results will be communicated to you by MyChart, letter or phone within 4 business days after the clinic has received the results. If you do not hear from us within 7 days, please contact the clinic through MyChart or phone. If you have a critical or abnormal lab result, we will notify you by phone as soon as possible.  Submit refill requests through [facility] or call your pharmacy and they will forward the refill request to us. Please allow 3 business days for your refill to  "be completed.          Additional Information About Your Visit        [facility] Information     Smart Voicemail lets you send messages to your doctor, view your test results, renew your prescriptions, schedule appointments and more. To sign up, go to [email or web address] . Click on \"Log in\" on the left side of the screen, which will take you to the Welcome page. Then click on \"Sign up Now\" on the right side of the page.     You will be asked to enter the access code listed below, as well as some personal information. Please follow the directions to create your username and password.     Your access code is: [email or web address]  Expires: 2019  9:57 AM     Your access code will  in 90 days. If you need help or a new code, please call your [facility] clinic or [phone].        Care EveryWhere ID     This is your Care EveryWhere ID. This could be used by other organizations to access your [facility] medical records  [id number]         Blood Pressure from Last 3 Encounters:   18 117/67   18 (!) 88/67   10/26/18 91/59    Weight from Last 3 Encounters:   18 63.3 kg (139 lb 8.8 oz)   18 60.9 kg (134 lb 4.2 oz)   10/26/18 62.8 kg (138 lb 7.2 oz)              Today, you had the following     No orders found for display       Primary Care Provider Fax #    Provider Not In System [phone]                Equal Access to Services     [facility]: Hadii errol palacios hadasho Somaraali, waaxda luqadaha, qaybta kaalmada patricia, jessy palm . So [facility] [phone].    ATENCIÓN: Si habla español, tiene a london disposición servicios gratuitos de asistencia lingüística. Llame al [phone].    We comply with applicable federal civil rights laws and [place] laws. We do not discriminate on the basis of race, color, national origin, age, disability, sex, sexual orientation, or gender identity.            Thank you!     Thank you for choosing  CASE MANAGEMENT  for your care. Our goal is " always to provide you with excellent care. Hearing back from our patients is one way we can continue to improve our services. Please take a few minutes to complete the written survey that you may receive in the mail after your visit with us. Thank you!             Your Updated Medication List - Protect others around you: Learn how to safely use, store and throw away your medicines at www.disposemymeds.org.          This list is accurate as of 11/28/18  4:20 PM.  Always use your most recent med list.                   Brand Name Dispense Instructions for use Diagnosis    acetaminophen 325 MG tablet    TYLENOL    100 tablet    Take 2 tablets (650 mg) by mouth every 4 hours as needed for mild pain (discomfort with fever, fever of 102.5 or greater.)    Generalized pain       dronabinol 2.5 MG capsule    MARINOL    60 capsule    Take 1 capsule (2.5 mg) by mouth 2 times daily    Nausea, Anorexia       emollient external cream     453 g    Apply topically 4 times daily    Rash and nonspecific skin eruption       granisetron 1 MG tablet    KYTRIL    60 tablet    Take 1 tablet (1 mg) by mouth every 12 hours    Nausea       hydrocortisone 1 % external ointment    CORTIZONE-10    56 g    Apply sparingly to affected area, rash on neck three times a day.    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), Bone marrow transplant candidate       LORazepam 1 MG tablet    ATIVAN    60 tablet    Take 1 tablet (1 mg) by mouth 2 times daily    Nausea       melatonin 3 MG tablet     30 tablet    Take 1 tablet (3 mg) by mouth At Bedtime    Insomnia, unspecified type       mycophenolate 500 MG tablet    GENERIC EQUIVALENT    66 tablet    Take 2 tablets (1,000 mg) by mouth 3 times daily for 11 days    S/P allogeneic bone marrow  transplant (H), At 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       oxyCODONE 5 MG tablet    ROXICODONE    40 tablet    Take 1 tablet (5 mg) by mouth 3 times daily    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), Bone marrow transplant candidate       pantoprazole 40 MG EC tablet    PROTONIX    30 tablet    Take 1 tablet (40 mg) by mouth 2 times daily    S/P allogeneic bone marrow transplant (H), Nausea, On total parenteral nutrition       sulfamethoxazole-trimethoprim 800-160 MG tablet   Start taking on:  12/3/2018    BACTRIM DS/SEPTRA DS    20 tablet    Take 1 tablet by mouth Every Mon, Tues two times daily    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), S/P allogeneic bone marrow transplant (H), At risk for infection       tacrolimus 1 mg/mL suspension    GENERIC EQUIVALENT    50 mL    Take 0.8 mLs (0.8 mg) by mouth every 12 hours    S/P allogeneic bone marrow transplant (H), At 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       valACYclovir 1000 mg tablet    VALTREX    90 tablet    Take 1 tablet (1,000 mg) by mouth 3 times daily    S/P allogeneic bone marrow transplant (H), At risk for infection       * voriconazole 50 MG tablet    VFEND    180 tablet    Take three 50 mg tablets and one 200 mg table twice daily. Total dose is 350 mg twice daily.    S/P allogeneic bone marrow transplant (H), At risk for infection       * voriconazole 200 MG tablet    VFEND    60 tablet    Take three 50 mg tablets and one 200 mg table twice daily. Total dose is 350 mg twice daily.    S/P allogeneic bone marrow transplant (H), At risk for infection       * Notice:  This list has 2 medication(s) that are the same as other medications prescribed for you. Read the directions carefully, and ask your doctor or other care provider to review them with you.

## 2018-11-28 NOTE — PROGRESS NOTES
"D: Check in with Artemio and his parents, Dangelo and Mervat () in [facility] on behalf of primary  Bradly Weber.  Focus was on their initial adjustment to outpatient care after Artemio was discharged from the hospital last night following his lengthy transplant stay. Dangelo noted that they didn't leave the hospital until 5 or 6:00 because of a \"supposed emergency\" that resulted in the MD team rounding later in the day. I verified that there was an actual emergency and apologized for the delay.  Dangelo and Mervat reported that so far the transition seems to be going well. Artemio did not speak in my presence today; he was sighing, frowning and putting on his coat. He father commented \"he just wants to get out of here; he just wants to get going.\"  I explained that the Physicians' Assistant was working on addressing some questions and clarifying the plan and would be back in the room shortly.   I: Brief supportive check-in  A: Artemio did not engage in conversation today; he presented as tired and irritable. Parents presented as understanding the plan of care and reported having not current concerns.  P: Social work to follow re: psychosocial needs related to post-transplant care. Artemio is scheduled to be back in clinic on Friday.    Copied from Chart Review:      PEDIATRIC BLOOD & MARROW TRANSPLANT SOCIAL WORK PSYCHOSOCIAL ASSESSMENT                         Date: 10/26/18          Assessment of living situation, support system, financial status, functional status, coping abilities/strategies, stressors, need for resources and other social work interventions.          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          Physician(s): Dr. Viky Zavala      Nurse Coordinator: Daria Jara          Presenting Information:       Artemio is a 18 year old male patient with Acute Lymphoblastic Leukemia (ALL) who is pursuing curative treatment through a bone marrow transplant. Artemio was admitted to the bone marrow transplant unit at the [facility] on [facility] Oct. 28th to begin his pre-transplant conditioning regimen. Artemio's older brother Link will be his matched sibling donor. Link is an adult and therefore he can provide his own consent for marrow donation procedure.           Special Considerations/Accomodations:       Patient's parents Dangelo and Mervat are  but amicable. They are both involved in Artemio's care as sources of support. Dangelo is remarried to his wife Camryn and Mervat has a significant other named Jada.  explained to Dangelo and Mervat that the care team will be unable to update multiple family members on a daily basis and recommended they come up with a system to update each other. They decided to trial keeping a notebook in Artemio's room and whoever is there getting updates from the doctor on any given day can take notes and leave them there for the next person to read and update as necessary.               Contact/Legal Info:       Decision Maker(s): Artemio is an adult and able to make his own medical decisions at this time. He wants all medical decisions to go through him and not his parents.      Special Custody Considerations: N/A      Advance Directive: Artemio states he completed an advanced directive while undergoing cancer treatment [facility]. He signed a release of information so his treatment team at [facility] can access a copy.        Permanent Address: [address] [place] [address] Lives with  paternal grandmother and paternal grandfather      Local Address: [address] [facility], [place] [address]   Staying locally with father and step mother until he is greater than 100 days post BMT.      Phone number(s): Mom - Mervat: [phone]                                                       Dad - Dangelo: [phone]                                                       Step-Mom - Camryn: [phone]              Support System/Relationship Status/Family History: Artemio's parents and paternal grandparents are his primary support system. His parents, Mervat and Dangelo, are  and civil, but prefer to visit Artemio at different times and communicate directly with Artemio versus with each other as much as possible.  Dangelo is remarried and his wife's name is Camryn. Artemio has given verbal permission for his parents and Camryn all to get any information they would like to know. Edwige explained to all of his parents that the medical team will not be able to update multiple family members per day and that they should try to update each other as much as possible.   Artemio also has two siblings, an older sister and brother. His sister Kamilah lives locally in [place], [place] is  and has one child. His brother Link lives in [place], where Mervat's family is from. Mervat had also relocated back to [place] to assist her parents as they aged but has come back to [place] to assist with the transplant process.   Patient previously had been living in his patnernal grandparents basement. There were some concerns from various family members that this environment was not suitable for a post BMT patient. (reasoning was that it was damp and musty and it was unknown if mold and mildew were present. Fortunately, Artemio decided that going back to live at his grandparents house during post BMT follow up was not a good  "idea for several reasons, mainly because his grandparents have their own health issues and he didn't want to stress them out by living at their house while also going through his own health issues..  Artemio states he does not have a significant other at this time.          Unique Patient/Family Needs:  Establishing effective communication pattern with patient and his parents.      Spirituality/Lottie Affiliation: Patient does not identify with lottie community        Communication Preferences: Artemio wants all medical communication and decisions to go through him. He prefers direct to the point communication. He does not like to be bothered with examinations and questions if they're not necessary.              Caregiver Plan: Parents, Artemio's primary care givers will be Mervat Pierson and step-mom Camryn. He will be staying at his dad and step-mom's house after discharge.  continuing to work with Artemio's parents on this plan. Initially it was reported by other family members that Dangelo preferred not to have Mervat at their house when it was her turn to provide care giving duties and that same individual reported that Mervat preferred not to be at Dangelo and Camryn's house. The other most likely option would be for Isael to go back and forth from Dangelo and Camryn's house to Mervat's apartment. Edwige advised them to come up with a plan based on what was best for Artemio, what his preferences are, and what their family can manage.       Patient & Caregiver Knowledge of Medical Condition and Plan of Care: Artemio and his family are understanding of his medical condition and plan of care for BMT.      Patient and Caregiver Transplant Goals: Artemio and his parents state they \"Just want to get through it\".              Patient Personality/Communication/Coping/Interests/Activities: approachable, withdrawn and quiet. Patient states that when he doesn't feel good he irish by playing video games, watching " movies or TV and sleeping. He also states he's not a morning person and prefers not to be bothered at night or if he's napping. He requested nursing bundle cares when possible. Edwige explained that the nurses try to be as accommodating as possible to patient preferences but it is not always possible due to the large number tasks they need to complete every day with every patient.      Patient Education/Developmental Level: Patient graduated from [employer] approximately a year ago and had been taking some time off before deciding about college. He plans to revisit applying to higher education once he is done with the BMT process.              Logistical Considerations:  Transportation: Private Car  Parking: at this time parents state they can afford to buy monthly parking passes.  Housing: patient and family is local.  Mervat has rented a short term apartment in [place] until Artemio is through transplant and she can return to [place].          Financial: Patient and his parents state that at this time they are able to meet their expenses with their current income. Because he essentially has 3 parents to share the care giving work, they all plan to continue working and therefore are hoping to maintain their current income.      Insurance:   Primary: [employer] of [place], Camryn carries this insurance.  Secondary: [facility]  Unique Issues?:       Patient/Caregiver Sources of Income/Employment: Patient is not employed at this time due to his frequent treatment needs related to his ALL.   Patient's mom, Mervat, is a tele nurse and has started a short term part time casual position at [employer] in [place]. Patient's Dad, Dangelo, owns his own commercial painting business. Since he's the owner he has some flexibility in his schedule and can be away from work regularly. Patient's step mom, Camryn, is a nurse and works at one of the [employer] in the [employer].  Financial  Concerns: none at this time. Artemio and his parents were encouraged to reach out if they had any financial concerns in the future.              Patient/Family Psychosocial Considerations:      Mental Health: No mental health issues identified         Chemical Health: No issues identified        Trauma/Loss/Abuse History: No identified issues        Legal Issues: No issues identified              Clinical Assessment and Recommendations:       Patient and family present as well-informed about and prepared for the treatment process. I did not identify any significant barriers to them managing the demands of treatment.          Concerns: none identified at this time.      Education Provided: Transplant process expectations, Caregiver requirements, Caregiver self-care, Financial issues related to transplant, Financial resources/grants available, Common psychosocial stressors pre/post transplant, Hopsital resources available and Social work role        Interventions Provided:   Education and counseling related to psychosocial issues and resources      Follow up planned:  Psychosocial support      Electronically signed by Lance Weber LICSW at 11/23/2018 12:55 PM

## 2018-11-28 NOTE — MR AVS SNAPSHOT
After Visit Summary   11/28/2018    Artemio Nino    MRN: [id number]           Patient Information     Date Of Birth          2000        Visit Information        Provider Department      11/28/2018 8:45 AM Janee Olmos PA-C Peds Blood and Marrow Transplant        Today's Diagnoses     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)        Bone marrow transplant candidate        At risk for infection              [facility], 9th floor  [address] [place] [address]  Phone: [phone]  Clinic Hours:   Monday-Friday:   7 am to 5:00 pm   closed weekends and major  holidays     If your fever is 100.5  or greater,   call the clinic during business hours.   After hours call [phone] and ask for the pediatric BMT physician to be paged for you.              Care Instructions    Return to [facility] for labs and exam with Janee Olmos on 11/30-- 0815 labs and 0845 appointment. Please hold tacro prior to visit for blood drug level, and take this medication after level obtained.    Infusion needs: none    Patient has PICC, Central line, CVC line, to be drawn off of per lab.     Medication changes:   - pick-up Melatonin Rx in Outpatient Pharmacy (must pay out of pocket), or okay to buy over the counter-- 3 mg by mouth nightly  - [facility]), just be sure to not store in or near warmth/heat to melt capsule  - use 2.5 mg oxycodone every 4-6 hours as needed for pain related to biopsy site  - change biopsy bandage this afternoon  - TPN to be delivered today    Care plan changes: none    Contact information  During business hours (7:30am-4:30pm):   To leave a non-urgent voicemail: call triage line [phone]    To call for time-sensitive needs or concerns : call clinic  [phone]    Evenings after 4:30pm, weekends, and holidays:   For any  needs or concerns: call for BMT fellow at [phone] in the case of an emergency    Thank you!           Follow-ups after your visit        Your next 10 appointments already scheduled     Nov 30, 2018  8:45 AM CST   Ump Bmt Peds Anniversary Visit with Janee Olmos PA-C   Peds Blood and Marrow Transplant ([facility])    [facility]  9th Floor  [address] [place] [phone]            Dec 01, 2018  8:00 AM CST   Ump Peds Infusion 180 with [facility] PEDS INFUSION CHAIR 6   Peds IV Infusion ([facility])    [facility]  9th Floor  [address] [place] [phone]            Dec 03, 2018 10:30 AM CST   Ump Bmt Peds Return with Janee Olmos PA-C   Peds Blood and Marrow Transplant ([facility])    [place] [address]  [address] [place] [phone]            Dec 05, 2018  9:30 AM CST   Ump Bmt Peds Return with Candido Han PA-C   Peds Blood and Marrow Transplant ([facility])    [facility]  9th Floor  [address] [place] [phone]            Dec 05, 2018 10:30 AM CST   Peds Eval with Kamilah Bates, PT   [facility] Physical Therapy - Outpatient ([facility])    [address] [place] [address] [address] [phone]            Dec 07, 2018 11:30 AM CST   Ump Bmt Peds Anniversary Visit with Viky Zavala MD   Peds Blood and Marrow Transplant ([facility])    [facility]  9th Floor  [address] [place] [address] [phone]            Dec 14, 2018 10:00 AM CST   Ump Bmt Peds Anniversary Visit with Viky Zavala MD   Peds Blood and Marrow Transplant ([facility])    [facility]  9th Floor  [address] [place] [address] [phone]             Dec 21, 2018 11:00 AM CST   [facility] Bmt Peds Anniversary Visit with Viky Zavala MD   Peds Blood and Marrow Transplant ([facility])    [facility]  9th Floor  [address] [facility] [id number] [phone]            Dec 28, 2018 10:00 AM CST   [facility] Bmt Peds Anniversary Visit with Viky Zavala MD   Peds Blood and Marrow Transplant ([facility])    [facility]  9th Floor  [address] [facility] [phone] [phone]              Future tests that were ordered for you today     Open Future Orders        Priority Expected Expires Ordered    Tacrolimus level Routine 2018    CBC with platelets differential Routine 2018    Basic metabolic panel Routine 2018    Magnesium Routine 2018    Phosphorus Routine 2018    CMV DNA quantification Routine 2018            Who to contact     Please call your clinic at [phone] to:    Ask questions about your health    Make or cancel appointments    Discuss your medicines    Learn about your test results    Speak to your doctor            Additional Information About Your Visit        MyChart Information     CS[employer] DualComt is an electronic gateway that provides easy, online access to your medical records. With [facility] DualComt, you can request a clinic appointment, read your test results, renew a prescription or communicate with your care team.     To sign up for [employer] visit the website at [email or web address]   You will be asked to enter the access code listed below, as well as some personal information. Please follow the directions to create your username and password.     Your access code is: [email or web address]  Expires: 2019  9:57 AM     Your access code will  in 90 days. If you need help or a new code, please contact your [facility]  [place] [facility] or call [phone] for assistance.        Care EveryWhere ID     This is your Care EveryWhere ID. This could be used by other organizations to access your [facility] medical records  [id number]        Your Vitals Were     Pulse Temperature Respirations Pulse Oximetry BMI (Body Mass Index)       102 98.2  F (36.8  C) (Oral) 21 100% 20.91 kg/m2        Blood Pressure from Last 3 Encounters:   11/28/18 117/67   11/27/18 (!) 88/67   10/26/18 91/59    Weight from Last 3 Encounters:   11/28/18 63.3 kg (139 lb 8.8 oz)   11/26/18 60.9 kg (134 lb 4.2 oz)   10/26/18 62.8 kg (138 lb 7.2 oz)              We Performed the Following     CBC with platelets differential     Comprehensive metabolic panel     Magnesium     Phosphorus     Tacrolimus level        Primary Care Provider Fax #    Provider Not In System [phone]                Equal Access to Services     CHARY MEJIAS : Hadii errol Carlos, wafrancisco javier crawley, qatoddta kaalmada patricia, jessy palm . So [facility] [phone].    ATENCIÓN: Si habla español, tiene a london disposición servicios gratuitos de asistencia lingüística. Llame al [phone].    We comply with applicable federal civil rights laws and [place] laws. We do not discriminate on the basis of race, color, national origin, age, disability, sex, sexual orientation, or gender identity.            Thank you!     [facility]  [facility] your care. Our goal is always to provide you with excellent care. Hearing back from our patients is one way we can continue to improve our services. Please take a few minutes to complete the written survey that you may receive in the mail after your visit with us. Thank you!             Your Updated Medication List - Protect others around you: Learn how to safely use, store and throw away your medicines at www.disposemymeds.org.          This list is accurate as of 11/28/18   3:11 PM.  Always use your most recent med list.                   Brand Name Dispense Instructions for use Diagnosis    acetaminophen 325 MG tablet    TYLENOL    100 tablet    Take 2 tablets (650 mg) by mouth every 4 hours as needed for mild pain (discomfort with fever, fever of 102.5 or greater.)    Generalized pain       dronabinol 2.5 MG capsule    MARINOL    60 capsule    Take 1 capsule (2.5 mg) by mouth 2 times daily    Nausea, Anorexia       emollient external cream     453 g    Apply topically 4 times daily    Rash and nonspecific skin eruption       granisetron 1 MG tablet    KYTRIL    60 tablet    Take 1 tablet (1 mg) by mouth every 12 hours    Nausea       hydrocortisone 1 % external ointment    CORTIZONE-10    56 g    Apply sparingly to affected area, rash on neck three times a day.    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), Bone marrow transplant candidate       LORazepam 1 MG tablet    ATIVAN    60 tablet    Take 1 tablet (1 mg) by mouth 2 times daily    Nausea       melatonin 3 MG tablet     30 tablet    Take 1 tablet (3 mg) by mouth At Bedtime    Insomnia, unspecified type       mycophenolate 500 MG tablet    GENERIC EQUIVALENT    66 tablet    Take 2 tablets (1,000 mg) by mouth 3 times daily for 11 days    S/P allogeneic bone marrow transplant (H), At 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       oxyCODONE 5 MG tablet    ROXICODONE    40 tablet    Take 1 tablet (5 mg) by mouth 3 times daily    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), Bone marrow transplant candidate       pantoprazole 40 MG EC tablet    PROTONIX    30 tablet    Take 1 tablet (40 mg) by mouth 2 times daily    S/P allogeneic bone marrow transplant (H), Nausea, On total parenteral nutrition        sulfamethoxazole-trimethoprim 800-160 MG tablet   Start taking on:  12/3/2018    BACTRIM DS/SEPTRA DS    20 tablet    Take 1 tablet by mouth Every Mon, Tues two times daily    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), S/P allogeneic bone marrow transplant (H), At risk for infection       tacrolimus 1 mg/mL suspension    GENERIC EQUIVALENT    50 mL    Take 0.8 mLs (0.8 mg) by mouth every 12 hours    S/P allogeneic bone marrow transplant (H), At 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       valACYclovir 1000 mg tablet    VALTREX    90 tablet    Take 1 tablet (1,000 mg) by mouth 3 times daily    S/P allogeneic bone marrow transplant (H), At risk for infection       * voriconazole 50 MG tablet    VFEND    180 tablet    Take three 50 mg tablets and one 200 mg table twice daily. Total dose is 350 mg twice daily.    S/P allogeneic bone marrow transplant (H), At risk for infection       * voriconazole 200 MG tablet    VFEND    60 tablet    Take three 50 mg tablets and one 200 mg table twice daily. Total dose is 350 mg twice daily.    S/P allogeneic bone marrow transplant (H), At risk for infection       * Notice:  This list has 2 medication(s) that are the same as other medications prescribed for you. Read the directions carefully, and ask your doctor or other care provider to review them with you.

## 2018-11-28 NOTE — PLAN OF CARE
Problem: Patient Care Overview  Goal: Plan of Care/Patient Progress Review  Physical Therapy Discharge Summary    Reason for therapy discharge:    All goals and outcomes met, no further needs identified.    Progress towards therapy goal(s). See goals on Care Plan in [facility] electronic health record for goal details.  Goals met    Therapy recommendation(s):    Continue home exercise program.

## 2018-11-28 NOTE — PROGRESS NOTES
This is a recent snapshot of the patient's [facility] medical record.  For current drug dose and complete information and questions, call [phone] or In Basket pool, [facility]v home infusion ([id number])  CSN Number:  [id number]

## 2018-11-29 LAB
BACTERIA SPEC CULT: NO GROWTH
COPATH REPORT: NORMAL
Lab: NORMAL
SPECIMEN SOURCE: NORMAL

## 2018-11-29 NOTE — PROGRESS NOTES
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 He overall has done well and is engrafted without signs of GvHD. He experienced anorexia necessitating TPN reliance, C diff colitis, nausea, and rash, all of which are resolved or improving. He discharged 11/27 and returns to clinic this morning for labwork and follow-up with his parents.    Isael reports that he generally is doing well since discharge. His discomfort related to his bone marrow biopsy site is improved today, and the incision is clean and dry per his report. He has not needed to take any PRN doses of oxycodone on top of his TID 5mg scheduled doses. His nausea is well controlled, only having one episode associated with medication administration over the past few days. He did vomit, but was able to re-take the doses he lost without issue. Taking kytril and ativan twice daily with medications. He has no questions nor concerns about medication and TPN administration, though parents share that they misunderstood instructions for how to hold dosing for tacro levels, thus he did not take his tacrolimus dose on Wednesday evening nor Thursday evening. Rash is improving over his neck with application of hydrocortisone, however it is now present over his bilateral dorsal forearms and hands. Mild pruritis present, though generally non-bothersome. Denies using new soaps, detergents, emollients, etc. PO intake remains relatively minimal, preferring applesauce and fruit cups though has trialed bites of new foods since his last visit. Parents keeping calorie counts. Does not yet feel marinol is improving his appetite. Stool remains small volume and improved off of PO vanco. Sleep improved since discharge, not currently taking melatonin. Denies headaches, other skin concerns, or other  questions.     Review of Systems: Pertinent positives include those mentioned in interval events. A complete review of systems was performed and is otherwise negative.       Medications:  Please see MAR     Physical Exam:  Vital Signs for Peds 11/30/2018   SYSTOLIC 108   DIASTOLIC 77   PULSE 107   TEMPERATURE 98.2   RESPIRATIONS 21   WEIGHT (kg) 63.3 kg   HEIGHT (cm)    BMI    pain    O2 97   GEN: Awake, sitting in exam room chair, alert, answers questions appropriately, pleasant and smiling today. NAD. Parents present.  HEENT: Normocephalic, atraumatic, alopecia, sclera clear, MMM, oral lesions healing, nares patent without discharge    CARD: Heart regular rate and rhythm. No murmurs, rubs or gallops. Cap refill 2 seconds.  RESP: Lungs clear in upper lobes, diminished in bases, shallow respirations.  No increased work of breathing, crackles or wheezes.   ABD: Non-distended, non-tender, bowel sounds present  EXTREM: No edema noted  SKIN: Confluent erythematous macular rash on neck, superficial excoriations present with interim improvement noted. New scattered erythematous macular rash over bilateral dorsal forearms.      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 He is currently BMT Transplant Date: BMT Txp 11/2/2018 (28 days).       Nausea and pain improved, stool output minimal. Rash improving over neck though now present over lower forearms, minimally pruritic. Taking meds with good tolerance, transitioning to outpatient setting.       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  Conditioning per 2015-29: TBI days -4 through -1 followed by matched sibling donor transplant on 11/2   - Engrafted  day +15, PB VNTR 100% donor, unseparated  - bone marrow biopsy 11/27, 20-30% donor with trilineage hematopoesis, flow negative, 100% donor. Chromosomes/FISH pending      # Risk for GVHD: Post-transplant Cytoxan on days +3 and +4 complete   - Continue MMF through day +35  - Continue tacrolimus (goal 5-10). Repeat level pending 11/28-- 7.5 no changes made. Repeat today 11/30 8.9 despite missing 2 doses over past 48h. Suspect recent vori dose increase to be playing a role. Decrease dose to 0.6 mg PO BID, will repeat level 12/3.      FEN/Renal:  # Risk for malnutrition: continue TPN/IL, currently cycled to 12 hours. Small amount of PO intake.  - weight was trending down, now increasing (lowest he's been at 59.6kg)  - continue Kcal counts  - marinol BID for appetite stimulation, increase as needed, consider TID dosing 12/3  - dietician following      # Risk for electrolyte abnormalities: stable today  - previous IV fluids contained potassium, now replaced in TPN  - Check daily electrolytes. Given cardiac history (WPW), keep K>3.5, Mg>1.8, iCa nml.  - phos elevated at 4.7 today, if continues to rise consider oral phos binder as TPN does not contain phos   - Mag and K sliding scale       # Risk for renal dysfunction and fluid overload:  - monitor I/O's and daily weights       # Risk for aHUS/TA-TMA:    - monitor LDH qMonday/Thursday post-BMT until outpatient then weekly through day +100, to date within acceptable range  - monitor urine protein/creatinine qTuesday through day +100, to date within acceptable range      Pulmonary:  # Risk for pulmonary toxicity secondary to chemotherapy:   - monitor respiratory function per unit routine      # Rhinorrhea: 3 mos history, mild. Sinus CT (10/22) with findings of mucosal thickening without evidence of active infection. RVP negative.      # Pneumomediastinum: finding upon work up Chest CT (10/22), asymptomatic. Per radiology, likely benign, perhaps transient.   - Patient  instructed to notify care team with any SOB, dyspnea, or remarkable cough  - none over last several days      Cardiovascular:  # Aaron-Parkinson-White Syndrome: diagnosed upon syncope in 02/2018. Asymptomatic, no interventions to date. Work up EKG c/w WPW, sinus bradycardia at 49 bpm. Work up ECHO normal with EF 68%.   Cardiology visit with Dr. Plummer (10/22): Holter revealed 10 PACs with no evidence of AV block. Normal Echocardiogram with EF 68%. Isael has hx of HR in high 30s with sleep.  - Avoid catecholinergics as able to avoid tachycardia  - Maintain normal iCa+, mag > 1.8, K > 3.5      # Risk for hypertension secondary to medications:   - no need for PRNs at this time as has been running low      Heme:   # Pancytopenia secondary to chemotherapy:   - Transfuse for hemoglobin < 8, platelets < 10,000 as now off of defibrotide, no premedications  - GCSF now PRN for ANC < 1000      Infectious Disease:  # Risk for infection given immunocompromised status:   Active: Afebrile  - central line exit site cultured 11/20: NGTD     Prophylaxis:                                                                                                                        - viral prophylaxis: CMV neg (donor CMV positive), HSV positive. Valtrex. Weekly testing 11/18 negative  - fungal prophylaxis: high risk - Voriconazole  11/26 level subtherapeutic, dose increased, repeat 12/3  - bacterial prophylaxis: Will start Bactrim at day +28 if counts appropriate-- begin Monday 12/3         # C.difficile colitis: stools appear to be improved, small volume and loose/watery  - discontinued oral vanco 11/25, monitor stools      GI:   # Nausea management: improved overall  - Scheduled medications: Kytril Q12H, ativan q 12 both prior to medications, both now oral  - Will need ativan wean as outpatient-- consider decreasing 12/3  - PRN medications: Ativan (also for anxiety), benadryl       # Risk for VOD: especially high risk-- previous Inotuzumab  therapy.  Defibrotide ppx stopped 11/22  - stopped ursodiol 11/26, and monitor LFTs     # Hemorrhoids: preparation H prn, has not complained of related discomfort in several days.     ENT:  # Epistaxis: occasional drips, nasal mucosa dry  - Ayr gel PRN  - Vaseline to nares      Neuro:  # Mucositis/pain: generally improving/resolved  - continues with abdominal cramping though improving from previous. Pain is sporadic and intense but self -resolving  - oxycodone TID with available PRN, will continue to wean as able-- today decrease to BID dosing  - encourage warm packs, moving around, and monitoring if abd discomfort happens around stooling or not  - Integrative therapy      # Pruritis: resolved in anal area, now with rash on neck  - benadryl and hydroxyzine PRN     Derm:  # Skin breakdown, perianal area: no current concerns, and last assessed by Kale and Dr. Alcaraz on 11/22     # Insomnia: difficulty sleeping inpatient, with abdominal cramping and overnight interruptions. Now improving.   - improved with compliance with set schedule inpt, see previous notes for details  - continue melatonin 3 mg at bedtime-- okay to take PRN as sleep now improved since discharge     Social  - actively involved in creating an effective plan of care post discharge, parents are not  and communication appears challenging. Completed multidisciplinary team meeting with family on 11/21 with social work and RN coordinator  -Mother has concern of historical non-compliance with medication and cares and this continues to be addressed/reinforced by team as able    Disposition: RTC Monday as previously planned for labwork and provider visit    SEBAS Christian (Flesher), PA-C  Pediatric Blood and Marrow Transplant Program  [facility]  Pager: [phone]  Fax: [phone]

## 2018-11-29 NOTE — PROGRESS NOTES
This is a recent snapshot of the patient's [facility] medical record.  For current drug dose and complete information and questions, call [phone] or In Basket pool, [facility] home infusion ([id number])  CSN Number:  [id number]

## 2018-11-30 ENCOUNTER — ONCOLOGY VISIT (OUTPATIENT)
Dept: TRANSPLANT | Facility: CLINIC | Age: 18
End: 2018-11-30
Attending: PHYSICIAN ASSISTANT
Payer: COMMERCIAL

## 2018-11-30 ENCOUNTER — HOME INFUSION (PRE-WILLOW HOME INFUSION) (OUTPATIENT)
Dept: PHARMACY | Facility: CLINIC | Age: 18
End: 2018-11-30

## 2018-11-30 ENCOUNTER — ALLIED HEALTH/NURSE VISIT (OUTPATIENT)
Dept: TRANSPLANT | Facility: CLINIC | Age: 18
End: 2018-11-30

## 2018-11-30 VITALS
TEMPERATURE: 98.2 F | SYSTOLIC BLOOD PRESSURE: 108 MMHG | HEART RATE: 107 BPM | BODY MASS INDEX: 20.91 KG/M2 | DIASTOLIC BLOOD PRESSURE: 77 MMHG | RESPIRATION RATE: 21 BRPM | WEIGHT: 139.55 LBS | OXYGEN SATURATION: 97 %

## 2018-11-30 DIAGNOSIS — Z71.9 ENCOUNTER FOR COUNSELING: Primary | ICD-10-CM

## 2018-11-30 DIAGNOSIS — Z94.81 S/P ALLOGENEIC BONE MARROW TRANSPLANT (H): ICD-10-CM

## 2018-11-30 DIAGNOSIS — Z91.89 AT RISK FOR GRAFT VERSUS HOST DISEASE: ICD-10-CM

## 2018-11-30 DIAGNOSIS — Z76.82 BONE MARROW TRANSPLANT CANDIDATE: ICD-10-CM

## 2018-11-30 DIAGNOSIS — C91.01 ACUTE LYMPHOBLASTIC LEUKEMIA (ALL) IN REMISSION (H): Primary | ICD-10-CM

## 2018-11-30 LAB
ALBUMIN SERPL-MCNC: 3.4 G/DL (ref 3.4–5)
ALP SERPL-CCNC: 86 U/L (ref 40–150)
ALT SERPL W P-5'-P-CCNC: 40 U/L (ref 0–70)
ANION GAP SERPL CALCULATED.3IONS-SCNC: 8 MMOL/L (ref 3–14)
ANISOCYTOSIS BLD QL SMEAR: SLIGHT
AST SERPL W P-5'-P-CCNC: 37 U/L (ref 0–45)
BASOPHILS # BLD AUTO: 0.1 10E9/L (ref 0–0.2)
BASOPHILS NFR BLD AUTO: 1.7 %
BILIRUB SERPL-MCNC: 0.5 MG/DL (ref 0.2–1.3)
BUN SERPL-MCNC: 18 MG/DL (ref 7–30)
CALCIUM SERPL-MCNC: 8.7 MG/DL (ref 8.5–10.1)
CHLORIDE SERPL-SCNC: 108 MMOL/L (ref 94–109)
CO2 SERPL-SCNC: 26 MMOL/L (ref 20–32)
COPATH REPORT: NORMAL
CREAT SERPL-MCNC: 0.67 MG/DL (ref 0.66–1.25)
DIFFERENTIAL METHOD BLD: ABNORMAL
EOSINOPHIL # BLD AUTO: 0 10E9/L (ref 0–0.7)
EOSINOPHIL NFR BLD AUTO: 0 %
ERYTHROCYTE [DISTWIDTH] IN BLOOD BY AUTOMATED COUNT: 13.8 % (ref 10–15)
GFR SERPL CREATININE-BSD FRML MDRD: >90 ML/MIN/1.7M2
GLUCOSE SERPL-MCNC: 105 MG/DL (ref 70–99)
HCT VFR BLD AUTO: 27.6 % (ref 40–53)
HGB BLD-MCNC: 9.4 G/DL (ref 13.3–17.7)
LYMPHOCYTES # BLD AUTO: 0.3 10E9/L (ref 0.8–5.3)
LYMPHOCYTES NFR BLD AUTO: 9.6 %
MAGNESIUM SERPL-MCNC: 1.9 MG/DL (ref 1.6–2.3)
MCH RBC QN AUTO: 28.9 PG (ref 26.5–33)
MCHC RBC AUTO-ENTMCNC: 34.1 G/DL (ref 31.5–36.5)
MCV RBC AUTO: 85 FL (ref 78–100)
MICROCYTES BLD QL SMEAR: PRESENT
MONOCYTES # BLD AUTO: 0.4 10E9/L (ref 0–1.3)
MONOCYTES NFR BLD AUTO: 13 %
MYELOCYTES # BLD: 0 10E9/L
MYELOCYTES NFR BLD MANUAL: 0.9 %
NEUTROPHILS # BLD AUTO: 2.4 10E9/L (ref 1.6–8.3)
NEUTROPHILS NFR BLD AUTO: 74.8 %
PHOSPHATE SERPL-MCNC: 4.7 MG/DL (ref 2.5–4.5)
PLATELET # BLD AUTO: 67 10E9/L (ref 150–450)
PLATELET # BLD EST: ABNORMAL 10*3/UL
POIKILOCYTOSIS BLD QL SMEAR: SLIGHT
POTASSIUM SERPL-SCNC: 3.8 MMOL/L (ref 3.4–5.3)
PROT SERPL-MCNC: 6.7 G/DL (ref 6.8–8.8)
RBC # BLD AUTO: 3.25 10E12/L (ref 4.4–5.9)
SODIUM SERPL-SCNC: 142 MMOL/L (ref 133–144)
TACROLIMUS BLD-MCNC: 8.9 UG/L (ref 5–15)
TME LAST DOSE: NORMAL H
WBC # BLD AUTO: 3.2 10E9/L (ref 4–11)

## 2018-11-30 PROCEDURE — 86850 RBC ANTIBODY SCREEN: CPT | Performed by: PHYSICIAN ASSISTANT

## 2018-11-30 PROCEDURE — 86922 COMPATIBILITY TEST ANTIGLOB: CPT | Performed by: PHYSICIAN ASSISTANT

## 2018-11-30 PROCEDURE — 80053 COMPREHEN METABOLIC PANEL: CPT | Performed by: NURSE PRACTITIONER

## 2018-11-30 PROCEDURE — 80197 ASSAY OF TACROLIMUS: CPT | Performed by: PHYSICIAN ASSISTANT

## 2018-11-30 PROCEDURE — 86901 BLOOD TYPING SEROLOGIC RH(D): CPT | Performed by: PHYSICIAN ASSISTANT

## 2018-11-30 PROCEDURE — 86900 BLOOD TYPING SEROLOGIC ABO: CPT | Performed by: PHYSICIAN ASSISTANT

## 2018-11-30 PROCEDURE — 85025 COMPLETE CBC W/AUTO DIFF WBC: CPT | Performed by: NURSE PRACTITIONER

## 2018-11-30 PROCEDURE — G0463 HOSPITAL OUTPT CLINIC VISIT: HCPCS | Mod: ZF

## 2018-11-30 PROCEDURE — 84100 ASSAY OF PHOSPHORUS: CPT | Performed by: NURSE PRACTITIONER

## 2018-11-30 PROCEDURE — 36592 COLLECT BLOOD FROM PICC: CPT | Performed by: NURSE PRACTITIONER

## 2018-11-30 PROCEDURE — 83735 ASSAY OF MAGNESIUM: CPT | Performed by: NURSE PRACTITIONER

## 2018-11-30 RX ORDER — OXYCODONE HYDROCHLORIDE 5 MG/1
5 TABLET ORAL 2 TIMES DAILY
Qty: 40 TABLET | Refills: 0 | COMMUNITY
Start: 2018-11-30 | End: 2018-12-05

## 2018-11-30 ASSESSMENT — PAIN SCALES - GENERAL: PAINLEVEL: MILD PAIN (3)

## 2018-11-30 NOTE — PATIENT INSTRUCTIONS
Return to [facility] for labs and exam with an POLLO on Monday 12/3. Please hold Monday Morning Tacrolimus prior to visit for blood drug level, and take this medication after level obtained.    Infusion needs: possible PRBCs Monday 12/3, current appointment time is 1400, though if possible and transfusion is needed, will try to move time up on Monday    Patient has PICC, Central line, CVC line, to be drawn off of per lab.     Medication changes:   - Decrease Oxycodone dosin mg (1 tablet) in the morning (10a), and 5 mg (1 tablet) in the evening (10p). Discontinue afternoon dose. If uncomfortable and additional dose is desired, trial 1.5 mg (1/2 tablet) every 4-6 hours as needed    Care plan changes: none, continue hydrocortisone cream 4x daily, call fellow over weekend if rash worsening    Contact information  During business hours (7:30am-4:30pm):   To leave a non-urgent voicemail: call triage line [phone]    To call for time-sensitive needs or concerns : call clinic  [phone]    Evenings after 4:30pm, weekends, and holidays:   For any needs or concerns: call for BMT fellow at [phone] in the case of an emergency    Thank you!   Scheduled with Janee Olmos for exam on 2018 at 10:30am, labs at 10:00am and Infusion at 2:00pm as of 2018 at 8:57am JE

## 2018-11-30 NOTE — PHARMACY-CONSULT NOTE
Discharge TPN Monitoring      Artemio's TPN formula, labs, and nutritional status were reviewed today. The following TPN changes are recommended: None.   Discussed with HANNAH Mendosa and communicated to [facility].     Artemio will return to clinic on Monday, 12/3/18 for labs and reassessment.      The following reflects the current TPN formula:  Dosing Weight: 65.1 kg  Volume: 1392 ml, cycled over 12 hours  Protein: 98 g/day   Dextrose: 255 g  Lipids: 240 mL      Sodium: 50 mEq/day  Potassium:   10 mEq/day  Calcium: 5 mEq/day  Magnesium:  45 mEq/day  Phosphorus:  0 mmol/day  Chloride:Acetate ratio: 1:2  Trace elements with Selenium: standard  Multivitamins: standard  Vitamin K:  2.5 mg/day      Pharmacy will continue to follow.    Alexa Ramirez, PharmD

## 2018-11-30 NOTE — MR AVS SNAPSHOT
After Visit Summary   11/30/2018    Artemio Nino    MRN: [id number]           Patient Information     Date Of Birth          2000        Visit Information        Provider Department      11/30/2018 9:39 AM Lance Weber LICSW Peds Blood and Marrow Transplant        Today's Diagnoses     Encounter for counseling    -  1          [facility] [place], 9th floor  [address] [place] [address]  Phone: [phone]  Clinic Hours:   Monday-Friday:   7 am to 5:00 pm   closed weekends and major  holidays     If your fever is 100.5  or greater,   call the clinic during business hours.   After hours call [phone] and ask for the pediatric BMT physician to be paged for you.               Follow-ups after your visit        Your next 10 appointments already scheduled     Dec 03, 2018 10:30 AM CST   p Bmt Peds Return with Janee Olmos PA-C   Peds Blood and Marrow Transplant ([facility])    [facility]  [address]th [address] [id number] [phone]            Dec 05, 2018  9:30 AM CST   Ump Bmt Peds Return with Candido Han PA-C   Peds Blood and Marrow Transplant ([facility])    [facility]  [address] [id number] [phone]            Dec 05, 2018 10:30 AM CST   Peds Eval with Kamilah Bates, PT   [facility] Physical Therapy - Outpatient ([facility])    [address] [place] [address] Room 46  [facility] [id number] [phone]            Dec 07, 2018 11:30 AM CST   Ump Bmt Peds Anniversary Visit with MD Anil Sofias Blood and Marrow Transplant ([facility])    [facility]  9th Floor  [address] [place] [id number] [phone]            Dec 14, 2018 10:00 AM CST   p Bmt Peds Anniversary Visit with MD Anil Sofias Blood and  Marrow Transplant ([facility])    [facility]  9th Floor  [address] [facility] [address]   [phone]            Dec 21, 2018 11:00 AM CST   [facility] Bmt Peds Anniversary Visit with Viky Zavala MD   Peds Blood and Marrow Transplant ([facility])    [facility]  9th Floor  [address] [facility] [address]   [phone]            Dec 28, 2018 10:00 AM CST   [facility] Bmt Peds Anniversary Visit with Viky Zavala MD   Peds Blood and Marrow Transplant ([facility])    [facility]  9th Floor  [address] [facility] [address]   [phone]              Who to contact     Please call your clinic at [phone] to:    Ask questions about your health    Make or cancel appointments    Discuss your medicines    Learn about your test results    Speak to your doctor            Additional Information About Your Visit        [employer] Information     Texifter is an electronic gateway that provides easy, online access to your medical records. With Texifter, you can request a clinic appointment, read your test results, renew a prescription or communicate with your care team.     To sign up for Texifter visit the website at [email or web address]   You will be asked to enter the access code listed below, as well as some personal information. Please follow the directions to create your username and password.     Your access code is: [email or web address]  Expires: 2019  9:57 AM     Your access code will  in 90 days. If you need help or a new code, please contact your [facility] or call [phone] for assistance.        Care EveryWhere ID     This is your Care EveryWhere ID. This could be used by other organizations to access your [facility] medical records  [id number]         Blood Pressure from Last 3 Encounters:   18 108/77   18 117/67   18 (!) 88/67    Weight  from Last 3 Encounters:   11/30/18 63.3 kg (139 lb 8.8 oz)   11/28/18 63.3 kg (139 lb 8.8 oz)   11/26/18 60.9 kg (134 lb 4.2 oz)              Today, you had the following     No orders found for display       Primary Care Provider    None Specified       No primary provider on file.        Equal Access to Services     [facility] Center[facility]EL[facility] : Hadii errol palacios hadjaredo Soshady, waaxda luqadaha, qaybta kaalmada patricia, jessy valverdeviktoralcides palm . So [facility] [phone].    ATENCIÓN: Si habla espmoises, tiene a london disposición servicios gratuitos de asistencia lingüística. Llame al [phone].    We comply with applicable federal civil rights laws and [place] laws. We do not discriminate on the basis of race, color, national origin, age, disability, sex, sexual orientation, or gender identity.            Thank you!     Thank you for choosing [facility] BLOOD AND MARROW TRANSPLANT  for your care. Our goal is always to provide you with excellent care. Hearing back from our patients is one way we can continue to improve our services. Please take a few minutes to complete the written survey that you may receive in the mail after your visit with us. Thank you!             Your Updated Medication List - Protect others around you: Learn how to safely use, store and throw away your medicines at www.disposemymeds.org.          This list is accurate as of 11/30/18 11:41 AM.  Always use your most recent med list.                   Brand Name Dispense Instructions for use Diagnosis    acetaminophen 325 MG tablet    TYLENOL    100 tablet    Take 2 tablets (650 mg) by mouth every 4 hours as needed for mild pain (discomfort with fever, fever of 102.5 or greater.)    Generalized pain       dronabinol 2.5 MG capsule    MARINOL    60 capsule    Take 1 capsule (2.5 mg) by mouth 2 times daily    Nausea, Anorexia       emollient external cream     453 g    Apply topically 4 times daily    Rash and nonspecific skin eruption       granisetron 1  MG tablet    KYTRIL    60 tablet    Take 1 tablet (1 mg) by mouth every 12 hours    Nausea       hydrocortisone 1 % external ointment    CORTIZONE-10    56 g    Apply sparingly to affected area, rash on neck three times a day.    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), Bone marrow transplant candidate       LORazepam 1 MG tablet    ATIVAN    60 tablet    Take 1 tablet (1 mg) by mouth 2 times daily    Nausea       melatonin 3 MG tablet     30 tablet    Take 1 tablet (3 mg) by mouth At Bedtime    Insomnia, unspecified type       mycophenolate 500 MG tablet    GENERIC EQUIVALENT    66 tablet    Take 2 tablets (1,000 mg) by mouth 3 times daily for 11 days    S/P allogeneic bone marrow transplant (H), At 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       oxyCODONE 5 MG tablet    ROXICODONE    40 tablet    Take 1 tablet (5 mg) by mouth 3 times daily    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), Bone marrow transplant candidate       pantoprazole 40 MG EC tablet    PROTONIX    30 tablet    Take 1 tablet (40 mg) by mouth 2 times daily    S/P allogeneic bone marrow transplant (H), Nausea, On total parenteral nutrition       sulfamethoxazole-trimethoprim 800-160 MG tablet   Start taking on:  12/3/2018    BACTRIM DS/SEPTRA DS    20 tablet    Take 1 tablet by mouth Every Mon, Tues two times daily    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), S/P allogeneic bone marrow transplant (H), At risk for infection       tacrolimus 1 mg/mL suspension    GENERIC EQUIVALENT    50 mL    Take 0.8 mLs (0.8 mg) by mouth every 12 hours    S/P allogeneic bone marrow transplant (H), At 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       valACYclovir 1000 mg tablet    VALTREX    90 tablet    Take 1 tablet (1,000 mg) by mouth 3 times  daily    S/P allogeneic bone marrow transplant (H), At risk for infection       * voriconazole 50 MG tablet    VFEND    180 tablet    Take three 50 mg tablets and one 200 mg table twice daily. Total dose is 350 mg twice daily.    S/P allogeneic bone marrow transplant (H), At risk for infection       * voriconazole 200 MG tablet    VFEND    60 tablet    Take three 50 mg tablets and one 200 mg table twice daily. Total dose is 350 mg twice daily.    S/P allogeneic bone marrow transplant (H), At risk for infection       * Notice:  This list has 2 medication(s) that are the same as other medications prescribed for you. Read the directions carefully, and ask your doctor or other care provider to review them with you.

## 2018-11-30 NOTE — NURSING NOTE
Chief Complaint   Patient presents with     RECHECK     Patient here today for follow up with 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)     /77 (BP Location: Left arm, Patient Position: Fowlers, Cuff Size: Adult Regular)  Pulse 107  Temp 98.2  F (36.8  C) (Oral)  Resp 21  Wt 63.3 kg (139 lb 8.8 oz)  SpO2 97%  BMI 20.91 kg/m2  Erinn Underwood, [facility]  November 30, 2018

## 2018-11-30 NOTE — PROGRESS NOTES
"[facility]   PEDIATRIC BMT SOCIAL WORK PROGRESS NOTE  DATA:      accompanied POLLO in clinic appt with Artemio and his parents () Dangelo and Mervat on Friday Nov. 30th. Dangelo had some questions about accessing his encrypted email and there was also a miscommunication about a missed medication dose. Dangelo and Mervat disagreed about how it happened. Dangelo states Mervat told him to hold the dose which she denies saying.  and POLLO mediated the discussion and ultimately explained that how it happened or \"who's fault\" the miscommunication was not important as the BMT team was not trying to blame anyone. The important thing is that we as a group (care team, patient, family) have identified the miscommunication and clarified the med schedule, dosing and when certain meds need to be held prior to lab draws. Both parents requested that in the future the team try to update both parents at the same time of any med changes so there can be no confusion or information \"lost in translation\" from one caregiver to the next. Care team indicated they would try to do this as able, but can not commit to updating multiple caregivers 100% of the time. Mervat wondered if Dangelo and Isael would benefit from a few more home care nurse visits but the POLLO stated this was not clinically indicated as a necessary intervention at this time as overall Isael is doing really well and his labs and numbers all stable.     INTERVENTION:      Supportive check-in  Caregiver mediation  Logistical support - assisted Dangelo with e-mail encryption set up on his phone.    ASSESSMENT:      Isael appeared to be doing fairly well. He was actively engaged with POLLO and . Mervat and Dangelo were visibly frustrated with each other because of the miscommunication over the medication dose that was mistakenly held but overall seemed to be coping well. The overall impression is that Isael and his parents " all seemed to be doing a good job of following the care team's instructions and clearly all care about getting everything done correctly.       PLAN:    will provide ongoing psychosocial support to patient and family as needed.      REJI Barnes, [facility]    Pediatric Blood and Marrow Transplant  [phone]  [email or web address]      11/30/2018  11:28 AM

## 2018-11-30 NOTE — PHARMACY-IMMUNOSUPPRESSION MONITORING
Tacrolimus Monitoring Note     D:  Current tacrolimus dose: 0.8 mg BID   Tacro level: 8.9 ug/L (drawn ~23 hours post dose on an ideal 12 hour interval)    - Pt reports that he missed his Tacrolimus doses on 11/28 PM and 11/29 PM.  Question if he is confusing this with another medication, as it would be expected that   his tacrolimus level would be lower had he missed two doses.  However, Voriconazole dose was increase on 11/27/18 which could cause an elevation in tacrolimus   levels.   Goals for therapy = 5-10 ug/L   A:  Current trough level is within the desired range; however, given possible missed doses will opt to decrease the dose at this time.  Drug interactions include voriconazole (dose increased 11/27/18)   P:  Decrease dose to Tacrolimus 0.6 mg q12 hours.  Discussed recommendations with HANNAH Mendosa.  Recheck trough level on 12/3/18.   Pharmacy team will continue to follow.    Thank you!  Alexa Ramirez, AndreD

## 2018-11-30 NOTE — MR AVS SNAPSHOT
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, 9th floor  [address] [place] [address]  Phone: [phone]  Clinic Hours:   Monday-Friday:   7 am to 5:00 pm   closed weekends and major  holidays     If your fever is 100.5  or greater,   call the clinic during business hours.   After hours call [phone] and ask for the pediatric BMT physician to be paged for you.              Care Instructions    Return to [facility] for labs and exam with an POLLO on Monday 12/3. Please hold Monday Morning Tacrolimus prior to visit for blood drug level, and take this medication after level obtained.    Infusion needs: possible PRBCs Monday 12/3, current appointment time is 1400, though if possible and transfusion is needed, will try to move time up on Monday    Patient has PICC, Central line, CVC line, to be drawn off of per lab.     Medication changes:   - Decrease Oxycodone dosin mg (1 tablet) in the morning (10a), and 5 mg (1 tablet) in the evening (10p). Discontinue afternoon dose. If uncomfortable and additional dose is desired, trial 1.5 mg (1/2 tablet) every 4-6 hours as needed    Care plan changes: none, continue hydrocortisone cream 4x daily, call fellow over weekend if rash worsening    Contact information  During business hours (7:30am-4:30pm):   To leave a non-urgent voicemail: call triage line [phone]    To call for time-sensitive needs or concerns : call clinic   [phone]    Evenings after 4:30pm, weekends, and holidays:   For any needs or concerns: call for BMT fellow at [phone] in the case of an emergency    Thank you!             Follow-ups after your visit        Your next 10 appointments already scheduled     Dec 03, 2018 10:30 AM CST   Ump Bmt Peds Return with Janee Olmos PA-C   Peds Blood and Marrow Transplant ([facility])    [place] [address] [address] [phone]            Dec 03, 2018  2:00 PM CST   Ump Peds Infusion 180 with [facility] PEDS INFUSION CHAIR 2   Peds IV Infusion ([facility])    [place] [address] [address] [phone]            Dec 05, 2018  9:30 AM CST   Ump Bmt Peds Return with Candido Han PA-C   Peds Blood and Marrow Transplant ([facility])    [place] [address] [address]   [phone]            Dec 05, 2018 10:30 AM CST   Peds Eval with Kamilah Bates, PT   [facility] ([facility])    [address] [place] [address] [address] [address] [phone]            Dec 07, 2018 11:30 AM CST   Ump Bmt Peds Anniversary Visit with Viky Zavala MD   Peds Blood and Marrow Transplant ([facility])    [place] [address] [address]   [phone]            Dec 14, 2018 10:00 AM CST   Ump Bmt Peds Anniversary Visit with Viky Zavala MD   Peds Blood and Marrow Transplant ([facility])    [place] [address]  [address] [place] [address] [phone]            Dec 21, 2018 11:00 AM CST   Ump Bmt Peds Anniversary Visit with MD Anil Sofias Blood and Marrow Transplant ([facility])    [facility]  9th Floor  [address] [facility] [address] [phone]            Dec 28, 2018 10:00 AM CST   [facility] with MD Anil Sofias Blood and Marrow Transplant ([facility])    [facility]  9th Floor  [address] [facility] [id number] [phone]              Future tests that were ordered for you today     Open Standing Orders        Priority Remaining Interval Expires Ordered    Red blood cell prepare order unit Routine 99/100 CONDITIONAL (SPECIFY) BLOOD  11/30/2018            Who to contact     Please call your clinic at [phone] to:    Ask questions about your health    Make or cancel appointments    Discuss your medicines    Learn about your test results    Speak to your doctor            Additional Information About Your Visit        [employer] Information     [employer] gives you secure access to your electronic health record. If you see a primary care provider, you can also send messages to your care team and make appointments. If you have questions, please call your primary care clinic.  If you do not have a primary care provider, please call [phone] and they will assist you.      [employer] is an electronic gateway that provides easy, online access to your medical records. With [employer], you can request a clinic appointment, read your test results, renew a prescription or communicate with your care team.     To access your existing account, please contact your [facility] or call [phone] for assistance.        Care EveryWhere ID     This is your Care EveryWhere ID. This could be used by other organizations to access your [facility] medical records  [id number]        Your Vitals Were     Pulse Temperature Respirations Pulse Oximetry BMI (Body Mass Index)       107 98.2  F (36.8  C) (Oral) 21 97% 20.91 kg/m2        Blood Pressure from Last 3 Encounters:   11/30/18 108/77   11/28/18  117/67   11/27/18 (!) 88/67    Weight from Last 3 Encounters:   11/30/18 63.3 kg (139 lb 8.8 oz)   11/28/18 63.3 kg (139 lb 8.8 oz)   11/26/18 60.9 kg (134 lb 4.2 oz)              We Performed the Following     ABO/Rh type and screen     CBC with platelets differential     CMV DNA quantification     Comprehensive metabolic panel     Magnesium     Phosphorus     Tacrolimus level          Today's Medication Changes          These changes are accurate as of 11/30/18  4:41 PM.  If you have any questions, ask your nurse or doctor.               Start taking these medicines.        Dose/Directions    tacrolimus 1 mg/mL suspension   Commonly known as:  GENERIC EQUIVALENT   Used for:  S/P allogeneic bone marrow transplant (H), At 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   Started by:  Janee Olmos PA-C        Dose:  0.6 mg   Take 0.6 mLs (0.6 mg) by mouth every 12 hours   Quantity:  50 mL   Refills:  3         These medicines have changed or have updated prescriptions.        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 It is important to manage your pain, but opioids are not always the best choice. You should first try all the other options your care team gave you. Take this medicine for as short a time (and as few doses) as possible.    Some activities can increase your pain, such as bandage changes or therapy sessions. It may help to take your pain medicine 30 to 60 minutes before these activities. Reduce your stress by getting enough sleep, working on hobbies you  enjoy and practicing relaxation or meditation. Talk to your care team about ways to manage your pain beyond prescription opioids.    These medicines have risks:    DO NOT drive when on new or higher doses of pain medicine. These medicines can affect your alertness and reaction times, and you could be arrested for driving under the influence (DUI). If you need to use opioids long-term, talk to your care team about driving.    DO NOT operate heavy machinery    DO NOT do any other dangerous activities while taking these medicines.    DO NOT drink any alcohol while taking these medicines.     If the opioid prescribed includes acetaminophen, DO NOT take with any other medicines that contain acetaminophen. Read all labels carefully. Look for the word  acetaminophen  or  Tylenol.  Ask your pharmacist if you have questions or are unsure.    You can get addicted to pain medicines, especially if you have a history of addiction (chemical, alcohol or substance dependence). Talk to your care team about ways to reduce this risk.    All opioids tend to cause constipation. Drink plenty of water and eat foods that have a lot of fiber, such as fruits, vegetables, prune juice, apple juice and high-fiber cereal. Take a laxative (Miralax, milk of magnesia, Colace, Senna) if you don t move your bowels at least every other day. Other side effects include upset stomach, sleepiness, dizziness, throwing up, tolerance (needing more of the medicine to have the same effect), physical dependence and slowed breathing.    Store your pills in a secure place, locked if possible. We will not replace any lost or stolen medicine. If you don t finish your medicine, please throw away (dispose) as directed by your pharmacist. The [place] [employer]ution [employer] has more information about safe disposal: https://www.pca.state.[place]/living-green/managing-unwanted-medications         Primary Care Provider    None Specified       No primary provider on  file.        Equal Access to Services     [facility]: Hadii errol palacios husam Carlos, wabearda lugamaliel, qatayla elainelisa sloanfloridajordan, waxpearl fitz valverdeviktoralcides palm . So [facility] [phone].    ATENCIÓN: Si habla español, tiene a london disposición servicios gratuitos de asistencia lingüística. Llame al [phone].    We comply with applicable federal civil rights laws and [place] laws. We do not discriminate on the basis of race, color, national origin, age, disability, sex, sexual orientation, or gender identity.            Thank you!     Thank you for choosing PEDS BLOOD AND MARROW TRANSPLANT  for your care. Our goal is always to provide you with excellent care. Hearing back from our patients is one way we can continue to improve our services. Please take a few minutes to complete the written survey that you may receive in the mail after your visit with us. Thank you!             Your Updated Medication List - Protect others around you: Learn how to safely use, store and throw away your medicines at www.disposemymeds.org.          This list is accurate as of 11/30/18  4:41 PM.  Always use your most recent med list.                   Brand Name Dispense Instructions for use Diagnosis    acetaminophen 325 MG tablet    TYLENOL    100 tablet    Take 2 tablets (650 mg) by mouth every 4 hours as needed for mild pain (discomfort with fever, fever of 102.5 or greater.)    Generalized pain       dronabinol 2.5 MG capsule    MARINOL    60 capsule    Take 1 capsule (2.5 mg) by mouth 2 times daily    Nausea, Anorexia       emollient external cream     453 g    Apply topically 4 times daily    Rash and nonspecific skin eruption       granisetron 1 MG tablet    KYTRIL    60 tablet    Take 1 tablet (1 mg) by mouth every 12 hours    Nausea       hydrocortisone 1 % external ointment    CORTIZONE-10    56 g    Apply sparingly to affected area, rash on neck three times a day.    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), Bone marrow transplant candidate       LORazepam 1 MG tablet    ATIVAN    60 tablet    Take 1 tablet (1 mg) by mouth 2 times daily    Nausea       melatonin 3 MG tablet     30 tablet    Take 1 tablet (3 mg) by mouth At Bedtime    Insomnia, unspecified type       mycophenolate 500 MG tablet    GENERIC EQUIVALENT    66 tablet    Take 2 tablets (1,000 mg) by mouth 3 times daily for 11 days    S/P allogeneic bone marrow transplant (H), At 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       oxyCODONE 5 MG tablet    ROXICODONE    40 tablet    Take 1 tablet (5 mg) by mouth 2 times daily    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), Bone marrow transplant candidate       pantoprazole 40 MG EC tablet    PROTONIX    30 tablet    Take 1 tablet (40 mg) by mouth 2 times daily    S/P allogeneic bone marrow transplant (H), Nausea, On total parenteral nutrition       sulfamethoxazole-trimethoprim 800-160 MG tablet   Start taking on:  12/3/2018    BACTRIM DS/SEPTRA DS    20 tablet    Take 1 tablet by mouth Every Mon, Tues two times daily    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 Total dose is 350 mg twice daily.    S/P allogeneic bone marrow transplant (H), At  risk for infection       * voriconazole 200 MG tablet    VFEND    60 tablet    Take three 50 mg tablets and one 200 mg table twice daily. Total dose is 350 mg twice daily.    S/P allogeneic bone marrow transplant (H), At risk for infection       * Notice:  This list has 2 medication(s) that are the same as other medications prescribed for you. Read the directions carefully, and ask your doctor or other care provider to review them with you.

## 2018-12-01 LAB
CMV DNA SPEC NAA+PROBE-ACNC: NORMAL [IU]/ML
CMV DNA SPEC NAA+PROBE-LOG#: NORMAL {LOG_IU}/ML
SPECIMEN SOURCE: NORMAL

## 2018-12-02 LAB
ABO + RH BLD: ABNORMAL
ABO + RH BLD: ABNORMAL
BLD GP AB SCN SERPL QL: ABNORMAL
BLD PROD TYP BPU: ABNORMAL
BLOOD BANK CMNT PATIENT-IMP: ABNORMAL
NUM BPU REQUESTED: 1
SPECIMEN EXP DATE BLD: ABNORMAL

## 2018-12-03 ENCOUNTER — ONCOLOGY VISIT (OUTPATIENT)
Dept: TRANSPLANT | Facility: CLINIC | Age: 18
End: 2018-12-03
Attending: PHYSICIAN ASSISTANT
Payer: COMMERCIAL

## 2018-12-03 ENCOUNTER — ALLIED HEALTH/NURSE VISIT (OUTPATIENT)
Dept: CARE COORDINATION | Facility: CLINIC | Age: 18
End: 2018-12-03

## 2018-12-03 ENCOUNTER — HOME INFUSION (PRE-WILLOW HOME INFUSION) (OUTPATIENT)
Dept: PHARMACY | Facility: CLINIC | Age: 18
End: 2018-12-03

## 2018-12-03 VITALS
HEART RATE: 101 BPM | RESPIRATION RATE: 20 BRPM | OXYGEN SATURATION: 100 % | SYSTOLIC BLOOD PRESSURE: 105 MMHG | BODY MASS INDEX: 20.81 KG/M2 | TEMPERATURE: 98.8 F | DIASTOLIC BLOOD PRESSURE: 63 MMHG | WEIGHT: 138.89 LBS

## 2018-12-03 DIAGNOSIS — Z71.9 ENCOUNTER FOR COUNSELING: Primary | ICD-10-CM

## 2018-12-03 DIAGNOSIS — C91.01 ACUTE LYMPHOBLASTIC LEUKEMIA (ALL) IN REMISSION (H): Primary | ICD-10-CM

## 2018-12-03 DIAGNOSIS — R11.0 NAUSEA: ICD-10-CM

## 2018-12-03 PROCEDURE — G0463 HOSPITAL OUTPT CLINIC VISIT: HCPCS

## 2018-12-03 RX ORDER — LORAZEPAM 1 MG/1
TABLET ORAL
Qty: 60 TABLET | Refills: 0 | COMMUNITY
Start: 2018-12-03 | End: 2018-12-11

## 2018-12-03 RX ORDER — TRIAMCINOLONE ACETONIDE 1 MG/G
OINTMENT TOPICAL
Qty: 453.6 G | Refills: 0 | Status: SHIPPED | OUTPATIENT
Start: 2018-12-03 | End: 2018-12-28

## 2018-12-03 ASSESSMENT — PAIN SCALES - GENERAL: PAINLEVEL: NO PAIN (0)

## 2018-12-03 NOTE — PROGRESS NOTES
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 He overall has done well and is engrafted without signs of GvHD. He experienced anorexia necessitating TPN reliance, C diff colitis, nausea, and rash, all of which are resolved or improving. He discharged 11/27 and returns to clinic this morning for labwork and follow-up with his parents and step mother.    Isael reports that he generally had an okay weekend. His discomfort related to his bone marrow biopsy site is resolved and his only complaints of pain are occasional brief abdominal discomfort 2-3 times daily. This abdominal discomfort has been long-standing and recently has been unchanged. Denies nausea, stools remain loose and unchanged recently. Medications and TPN administered without issue, appropriately held tacro and vori for levels today. Not utilizing PRN medications. Rash on neck nearly resolved, rash on arms remains present and pruritic, seemingly slightly worse today per Isael though has not spread elsewhere. PO intake remains minimal, preferring fruits, fruit snacks, and applesauce. Taking marinol dosing at 10a and 10p, denies any notable improvement in appetite. Sleep improved since discharge. Denies headaches, other skin concerns, or other questions.     Review of Systems: Pertinent positives include those mentioned in interval events. A complete review of systems was performed and is otherwise negative.       Medications:  Please see MAR     Physical Exam:  Vital Signs for Peds 12/3/2018   SYSTOLIC 105   DIASTOLIC 63   PULSE 101   TEMPERATURE 98.8   RESPIRATIONS 20   WEIGHT (kg) 63 kg   HEIGHT (cm)    BMI    pain    O2 100   GEN: Awake, sitting on exam table. Alert, answers questions appropriately though in 1 word responses. NAD. Parents and step-mother present.  HEENT: Normocephalic,  atraumatic, alopecia, sclera clear, MMM, oral lesions healing, nares patent without discharge    CARD: Heart regular rate and rhythm. No murmurs, rubs or gallops. Cap refill 2 seconds.  RESP: Lungs clear in upper lobes, diminished in bases, shallow respirations.  No increased work of breathing, crackles or wheezes.   ABD: Non-distended, non-tender, bowel sounds present  EXTREM: No edema noted  SKIN: Previous erythematous macular rash on anterior neck nearly resolved, superficial excoriations present with interim improvement noted. Scattered erythematous macular rash over bilateral dorsal forearms appearing more confluent today, though has not spread in distribution.      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 He is currently BMT Transplant Date: BMT Txp 11/2/2018 (31 days).       Nausea and pain improved, stool output minimal. Rash improving over neck though now present over lower forearms, minimally pruritic. Taking meds with good tolerance, transitioning to outpatient setting.       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  Conditioning per 2015-29: TBI days -4 through -1 followed by matched sibling donor transplant on 11/2   - Engrafted day +15, PB VNTR 100% donor, unseparated  - bone marrow biopsy 11/27, 20-30% donor with trilineage hematopoesis, flow & FISH negative, 100% donor. Chromosomes pending.      # Risk for GVHD: Post-transplant Cytoxan on days +3 and +4 complete   - Continue MMF through day +35  - Continue tacrolimus (goal 5-10). Repeat level pending today 12/3, dose recently decreased 11/30.     FEN/Renal:  # Risk for malnutrition: continue TPN/IL, currently cycled to 12  hours. Small amount of PO intake.  - weight was trending down, now increasing (lowest he's been at 59.6kg)  - continue Kcal counts  - marinol BID for appetite stimulation-- today will change dosing times to 10a and 4p and monitor for effect on appetite  - dietician following      # Risk for electrolyte abnormalities: stable today  - Check daily electrolytes. Given cardiac history (WPW), keep K>3.5, Mg>1.8, iCa nml.  - phos improved to 4.2 today, none in TPN   - Mag and K sliding scale       # Risk for renal dysfunction and fluid overload:  - monitor I/O's and daily weights       # Risk for aHUS/TA-TMA:    - monitor LDH qMonday/Thursday post-BMT until outpatient then weekly through day +100, to date within acceptable range  - monitor urine protein/creatinine qTuesday through day +100, to date within acceptable range      Pulmonary:  # Risk for pulmonary toxicity secondary to chemotherapy:   - monitor respiratory function per unit routine      # Rhinorrhea: 3 mos history, mild. Sinus CT (10/22) with findings of mucosal thickening without evidence of active infection. RVP negative.      # Pneumomediastinum: finding upon work up Chest CT (10/22), asymptomatic. Per radiology, likely benign, perhaps transient.   - Patient instructed to notify care team with any SOB, dyspnea, or remarkable cough  - none over last several days      Cardiovascular:  # Aaron-Parkinson-White Syndrome: diagnosed upon syncope in 02/2018. Asymptomatic, no interventions to date. Work up EKG c/w WPW, sinus bradycardia at 49 bpm. Work up ECHO normal with EF 68%.   Cardiology visit with Dr. Plummer (10/22): Holter revealed 10 PACs with no evidence of AV block. Normal Echocardiogram with EF 68%. Isael has hx of HR in high 30s with sleep.  - Avoid catecholinergics as able to avoid tachycardia  - Maintain normal iCa+, mag > 1.8, K > 3.5      # Risk for hypertension secondary to medications:   - no need for PRNs at this time as has been running  low      Heme:   # Pancytopenia secondary to chemotherapy:   - Transfuse for hemoglobin < 8, platelets < 10,000 as now off of defibrotide, no premedications  - GCSF now PRN for ANC < 1000      Infectious Disease:  # Risk for infection given immunocompromised status:   Active: Afebrile  - central line exit site cultured 11/20: Negative     Prophylaxis:                                                                                                                        - viral prophylaxis: CMV neg (donor CMV positive), HSV positive. Valtrex. Weekly testing 11/18 negative  - fungal prophylaxis: high risk - Voriconazole  11/26 level subtherapeutic, dose increased, repeat 12/3 pending  - bacterial prophylaxis: Will start Bactrim at day +28 if counts appropriate-- begin today Monday 12/3         # C.difficile colitis: stools appear to be improved, small volume and loose/watery  - discontinued oral vanco 11/25, monitor stools      GI:   # Nausea management: improved overall  - Scheduled medications: Kytril Q12H, ativan q 12 both prior to medications, both now oral  - Will need ativan wean as outpatient-- today decrease to 1 mg in AM and 0.5 mg in PN  - PRN medications: Ativan (also for anxiety), benadryl       # Risk for VOD: especially high risk-- previous Inotuzumab therapy.  Defibrotide ppx stopped 11/22  - stopped ursodiol 11/26, and monitor LFTs     # Hemorrhoids: preparation H prn, has not complained of related discomfort in several days.     ENT:  # Epistaxis: occasional drips, nasal mucosa dry  - Ayr gel PRN  - Vaseline to nares      Neuro:  # Mucositis/pain: generally improving/resolved  - continues with abdominal cramping though improving from previous. Pain is sporadic and intense but self -resolving  - oxycodone BID with available PRN, will continue to wean as able-- 12/5 consider decreasing further  - encourage warm packs, moving around, and monitoring if abd discomfort happens around stooling or not  -  Integrative therapy      # Pruritis: resolved in anal area, now with rash on neck  - benadryl and hydroxyzine PRN     Derm:  # Skin breakdown, perianal area: no current concerns, and last assessed by Kale and Dr. Alcaraz on 11/22     # Insomnia: difficulty sleeping inpatient, with abdominal cramping and overnight interruptions. Now improving.   - improved with compliance with set schedule inpt, see previous notes for details  - continue melatonin 3 mg at bedtime-- okay to take PRN as sleep now improved since discharge    # Rash: erythematous, confluent rash over neck (nearly resolved) and bilateral dorsal forearms (slightly worsened today)  - Continues hydrocortisone cream QID to neck, today discontinue hydrocortisone to arms and begin triamcinolone ointment application QID     Social  - actively involved in creating an effective plan of care post discharge, parents are not  and communication appears challenging. Completed multidisciplinary team meeting with family on 11/21 with social work and RN coordinator  -Mother has concern of historical non-compliance with medication and cares and this continues to be addressed/reinforced by team as able    Disposition: RTC  Weds and Fri this week for labwork and provider visit    SEBAS Christian (Flesher), PARichaC  Pediatric Blood and Marrow Transplant Program  [facility]  Pager: [phone]  Fax: [phone]

## 2018-12-03 NOTE — PROGRESS NOTES
This is a recent snapshot of the patient's [facility] medical record.  For current drug dose and complete information and questions, call [phone] or In Basket pool, [facility] ([id number])  CSN Number:  [id number]

## 2018-12-03 NOTE — PHARMACY
Outpatient TPN Monitoring  Artemio's TPN formula, labs, and nutritional status were reviewed today. The following TPN changes are recommended: adjust Cl:Ac ratio  Discussed with HANNAH Mendosa and communicated to [facility].     Artemio will return to clinic on Wednesday 12/5 for labs and reassessment.      The following reflects the current TPN formula:  Dosing Weight: 65.1 kg  Volume: 1392 ml, cycled over 12 hours  Protein: 98 g/day   Dextrose: 255 g  Lipids: 240 mL      Sodium: 50 mEq/day  Potassium:   10 mEq/day  Calcium: 5 mEq/day  Magnesium:  45 mEq/day  Phosphorus:  0 mmol/day  Chloride:Acetate ratio: Change from 1:2 to 1:1  Trace elements with Selenium: standard  Multivitamins: standard  Vitamin K:  2.5 mg/day      Pharmacy will continue to follow.  Margaret Kelly, PharmD

## 2018-12-03 NOTE — MR AVS SNAPSHOT
After Visit Summary   12/3/2018    Artemio Nino    MRN: [id number]           Patient Information     Date Of Birth          2000        Visit Information        Provider Department      12/3/2018 12:44 PM Betty [place] LICSW [facility]        Today's Diagnoses     Encounter for counseling    -  1       Follow-ups after your visit        Your next 10 appointments already scheduled     Dec 03, 2018  2:00 PM CST   Ump Peds Infusion 180 with [facility]   Peds IV Infusion ([facility])    [place] [address] [address]   [phone]            Dec 05, 2018  9:30 AM CST   Ump Bmt Peds Return with Candido Han PA-C   Peds Blood and Marrow Transplant ([facility])    [address] [address]   [phone]            Dec 05, 2018 10:30 AM CST   Peds Eval with Kamilah Bates, PT   [facility] Physical Therapy - Outpatient ([facility])    [address] [place] [address] [address] [address]   [phone]            Dec 07, 2018 11:30 AM CST   Ump Bmt Peds Anniversary Visit with Viky Zavala MD   Peds Blood and Marrow Transplant ([facility])    [address] [address]   [phone]            Dec 14, 2018 10:00 AM CST   Ump Bmt Peds Anniversary Visit with Viky Zavala MD   Peds Blood and Marrow Transplant ([facility])    [place] [address] [address]   [phone]            Dec 21, 2018 11:00 AM CST   Ump Bmt Peds Anniversary Visit with Viky Zavala MD   Peds Blood and Marrow Transplant ([facility])    [address] [address]   [phone]            Dec 28, 2018 10:00 AM CST    [facility] Bmt Peds Anniversary Visit with Viky Zavala MD   Peds Blood and Marrow Transplant ([facility])    [facility]  9th Floor  [address] [facility] [address] [phone]              Future tests that were ordered for you today     Open Standing Orders        Priority Remaining Interval Expires Ordered    Red blood cell prepare order unit Routine 99/100 CONDITIONAL (SPECIFY) BLOOD  11/30/2018            Who to contact     If you have questions or need follow up information about today's clinic visit or your schedule please contact [facility] directly at No information on file..  Normal or non-critical lab and imaging results will be communicated to you by [employer], letter or phone within 4 business days after the clinic has received the results. If you do not hear from us within 7 days, please contact the clinic through [employer] or phone. If you have a critical or abnormal lab result, we will notify you by phone as soon as possible.  Submit refill requests through [employer] or call your pharmacy and they will forward the refill request to us. Please allow 3 business days for your refill to be completed.          Additional Information About Your Visit        MyChart Information     [employer] gives you secure access to your electronic health record. If you see a primary care provider, you can also send messages to your care team and make appointments. If you have questions, please call your primary care clinic.  If you do not have a primary care provider, please call [phone] and they will assist you.        Care EveryWhere ID     This is your Care EveryWhere ID. This could be used by other organizations to access your [facility] medical records  [id number]         Blood Pressure from Last 3 Encounters:   12/03/18 105/63   11/30/18 108/77   11/28/18 117/67    Weight from Last 3 Encounters:   12/03/18 63 kg (138 lb 14.2 oz)   11/30/18 63.3 kg (139 lb 8.8 oz)    11/28/18 63.3 kg (139 lb 8.8 oz)              Today, you had the following     No orders found for display         Today's Medication Changes          These changes are accurate as of 12/3/18  1:22 PM.  If you have any questions, ask your nurse or doctor.               Start taking these medicines.        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   Quantity:  453.6 g   Refills:  0         These medicines have changed or have updated prescriptions.        Dose/Directions    LORazepam 1 MG tablet   Commonly known as:  ATIVAN   This may have changed:    - how much to take  - how to take this  - when to take this  - additional instructions   Used for:  Nausea   Changed by:  Janee Olmos PA-C        Take 1 tablet by mouth in the morning, and 0.5 tablets by mouth in the evening   Quantity:  60 tablet   Refills:  0            Where to get your medicines      These medications were sent to [facility]  [place] [address]  [address], [place] [address]     Phone:  [phone]     triamcinolone 0.1 % external ointment                Primary Care Provider Office Phone # Fax #    Janee Olmos PA-C [phone]       [facility] PEDS BLOOD MARROW [place] [address] [facility] [address]        Equal Access to Services     CHARY MEJIAS AH: Hadii errol ku hadasho Soomaali, waaxda luqadaha, qaybta kaalmada adeegyada, waxay fitz madera. So [facility] [phone].    ATENCIÓN: Si habla español, tiene a london disposición servicios gratuitos de asistencia lingüística. Llame al [phone].    We comply with applicable federal civil rights laws and [place] laws. We do not discriminate on the basis of race, color, national  origin, age, disability, sex, sexual orientation, or gender identity.            Thank you!     Thank you for choosing [facility]  for your care. Our goal is always to provide you with excellent care. Hearing back from our patients is one way we can continue to improve our services. Please take a few minutes to complete the written survey that you may receive in the mail after your visit with us. Thank you!             Your Updated Medication List - Protect others around you: Learn how to safely use, store and throw away your medicines at www.disposemymeds.org.          This list is accurate as of 12/3/18  1:22 PM.  Always use your most recent med list.                   Brand Name Dispense Instructions for use Diagnosis    acetaminophen 325 MG tablet    TYLENOL    100 tablet    Take 2 tablets (650 mg) by mouth every 4 hours as needed for mild pain (discomfort with fever, fever of 102.5 or greater.)    Generalized pain       dronabinol 2.5 MG capsule    MARINOL    60 capsule    Take 1 capsule (2.5 mg) by mouth 2 times daily    Nausea, Anorexia       emollient external cream     453 g    Apply topically 4 times daily    Rash and nonspecific skin eruption       granisetron 1 MG tablet    KYTRIL    60 tablet    Take 1 tablet (1 mg) by mouth every 12 hours    Nausea       hydrocortisone 1 % external ointment    CORTIZONE-10    56 g    Apply sparingly to affected area, rash on neck three times a day.    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), Bone marrow transplant candidate       LORazepam 1 MG tablet    ATIVAN    60 tablet    Take 1 tablet by mouth in the morning, and 0.5 tablets by mouth in the evening    Nausea       melatonin 3 MG tablet     30 tablet    Take 1 tablet (3 mg) by mouth At Bedtime     Insomnia, unspecified type       mycophenolate 500 MG tablet    GENERIC EQUIVALENT    66 tablet    Take 2 tablets (1,000 mg) by mouth 3 times daily for 11 days    S/P allogeneic bone marrow transplant (H), At 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       oxyCODONE 5 MG tablet    ROXICODONE    40 tablet    Take 1 tablet (5 mg) by mouth 2 times daily    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), Bone marrow transplant candidate       pantoprazole 40 MG EC tablet    PROTONIX    30 tablet    Take 1 tablet (40 mg) by mouth 2 times daily    S/P allogeneic bone marrow transplant (H), Nausea, On total parenteral nutrition       sulfamethoxazole-trimethoprim 800-160 MG tablet    BACTRIM DS/SEPTRA DS    20 tablet    Take 1 tablet by mouth Every Mon, Tues two times daily    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 Total dose is 350 mg twice daily.    S/P allogeneic bone marrow transplant (H), At risk for infection       * voriconazole 200 MG tablet    VFEND    60 tablet    Take three 50 mg tablets and one 200 mg table twice daily. Total dose is 350 mg twice daily.    S/P allogeneic bone marrow transplant (H), At risk for infection       * Notice:  This list has 2  medication(s) that are the same as other medications prescribed for you. Read the directions carefully, and ask your doctor or other care provider to review them with you.

## 2018-12-03 NOTE — PROGRESS NOTES
"BMT SOCIAL WORK PROGRESS NOTE  DATA:   D: Visit with Artemio and his parents, Dangelo and Mervat () after they informed HANNAH Weller, that they wanted to talk to social work. I explained that Bradly, Primary , is out of the office today. Initially the parents denied having any questions for social work. I asked Artemio how he's doing - he said about the same as last week, \"no better\" and that he wanted to get going home from clinic. As I began to excuse myself Mervat asked me to stay in the room when Artemio left to complete giving a urine sample.  When he left the room Mervat asked if there's a \"psychological protocol\" for transplant patients. When asked to say more about her question she said that she's worried about Artemio's mood and wonders if he should \"see someone.\"  She then went on to say that because she is not physically present with Artemio it's been really difficult for her, she's worried about him and wonders if someone can talk to him \"about all of this.\" Dangelo commented several times that he views Artemio as really functioning at his baseline emotionally. He said that in the mornings Artemio is typically quiet, has flat affect and slow to get going. He said that by noon or 1:00 PM each day Artemio seems to perk up, become a bit more active, get on his computer.  He said that \"his good time of day\" is usually between mid-day and early evening when he seems to become tired and slow down again.  He noted that Artemio isn't really getting much exercise other than walking up & down the stairs. When I provided the parents with information about things to watch for (changes in demeanor, mood, level of interaction, level of activity) both agreed to watch for these changes and alert the team as needed. Dangelo also noted that he thinks it really helped for Artemio to learn today that if his friends are healthy and have had their influenza vaccine they can visit. He said that he noticed that " "Artemio really perked up, was laughing and talking with animation when a female friend visited recently. I also suggested that the parents allow Artemio to check in privately with Bradly, , regularly to allow for check-in's about mood and coping.   Mervat then spoke again about having difficulty not spending much time with Artemio and not really knowing when she can go over to Dangelo's & his wife's home (where Artemio is lodging post-transplant). Dangelo commented multiple times that Mervat is welcome to come to the house any time, adding that this is \"up to Artemio.\"  Mervat asked several times for Dangelo to respond to her texts to acknowledge her plans to visit so she'll feel more comfortable coming to his home; he agreed to do so.  Mervat also offered to schedule her visits at times when it's most helpful and convenient for Dangelo and his wife, Camryn. Dangelo suggested that one thing that would be helpful would be Mervat to  Artemio and bring him to clinic appointments so he could work during those times. She agreed to do this (when she isn't working) and plans to bring Arteimo to his Wed appointment.  I asked how things are going in regards to communication about the plan of care, including medication changes. Dangelo reported that there's no problem; he showed me the notebook and medication action plan sheets that he's using to track information. Mervat agreed.  Artemio was present in the room during discussion about visits with his mother. He focused on his telephone and when asked direct questions by his parents about his opinions he said that he didn't really care or \"it doesn't matter to me.\"  INTERVENTION:   Provided education to parent caregivers about post-transplant emotional coping, assessing function in the context of serious medical treatment, available resources.  Facilitated conversation between young adult patient's  parent caregivers re: visitation and caregiver " schedule.  ASSESSMENT:   Artemio presented as eager to leave the clinic, disinterested in engaging in parents discussion about mother's visit schedule. He appeared to be alert, responded to my questions in a clear, strong voice with direct eye contact; when using his phone he appeared relaxed, smiling/laughing quietly in reaction to what he was reading/viewing.   parent caregivers continue to adjust to working together to negotiate visits and routines. They benefit from coaching and encouragement to communicate directly with each other about plans and concerns related to Artemio's care needs.  PLAN:   Primary , Bradly Weber, to follow re:psychosocial needs related to BMT.    Copied from Chart Review:      PEDIATRIC BLOOD & MARROW TRANSPLANT SOCIAL WORK PSYCHOSOCIAL ASSESSMENT                         Date: 10/26/18          Assessment of living situation, support system, financial status, functional status, coping abilities/strategies, stressors, need for resources and other social work interventions.          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          Physician(s): Dr. Viky Zavala      Nurse Coordinator: Daria Jara          Presenting Information:       Artemio is a 18 year old male patient with Acute Lymphoblastic Leukemia (ALL) who is pursuing curative treatment through a bone marrow transplant. Artemio was admitted to the bone marrow transplant unit at the [facility] on [facility] Oct. 28th to begin his pre-transplant  conditioning regimen. Artemio's older brother Link will be his matched sibling donor. Link is an adult and therefore he can provide his own consent for marrow donation procedure.           Special Considerations/Accomodations:       Patient's parents Dangelo and Mervat are  but amicable. They are both involved in Artemio's care as sources of support. Dangelo is remarried to his wife Camryn and Mervat has a significant other named Jada.  explained to Dangelo and Mervat that the care team will be unable to update multiple family members on a daily basis and recommended they come up with a system to update each other. They decided to trial keeping a notebook in Artemio's room and whoever is there getting updates from the doctor on any given day can take notes and leave them there for the next person to read and update as necessary.               Contact/Legal Info:       Decision Maker(s): Artemio is an adult and able to make his own medical decisions at this time. He wants all medical decisions to go through him and not his parents.      Special Custody Considerations: N/A      Advance Directive: Artemio states he completed an advanced directive while undergoing cancer treatment at [facility]. He signed a release of information so his treatment team at [facility] can access a copy.       Permanent Address: [address] [place] [address] Lives with  paternal grandmother and paternal grandfather      Local Address[address] [place] [id number]   Staying locally with father and step mother until he is greater than 100 days post BMT.      Phone number(s): Mom - Mervat: [phone]                                                       Dad - Dangelo: [phone]                                                       Step-Mom - Camryn: [phone]              Support System/Relationship Status/Family History: Artemio's parents and paternal grandparents are his  primary support system. His parents, Mervat and Dangelo, are  and civil, but prefer to visit Artemio at different times and communicate directly with Artemio versus with each other as much as possible.  Dangelo is remarried and his wife's name is Camryn. Artemio has given verbal permission for his parents and Camryn all to get any information they would like to know. Sofiesantos explained to all of his parents that the medical team will not be able to update multiple family members per day and that they should try to update each other as much as possible.   Artemio also has two siblings, an older sister and brother. His sister Kamilah lives locally in [place], [place] is  and has one child. His brother Link lives in [place], where Mervat's family is from. Mervat had also relocated back to [place] to assist her parents as they aged but has come back to [place] to assist with the transplant process.   Patient previously had been living in his patnernal grandparents basement. There were some concerns from various family members that this environment was not suitable for a post BMT patient. (reasoning was that it was damp and musty and it was unknown if mold and mildew were present. Fortunately, Artemio decided that going back to live at his grandparents house during post BMT follow up was not a good idea for several reasons, mainly because his grandparents have their own health issues and he didn't want to stress them out by living at their house while also going through his own health issues..  Artemio states he does not have a significant other at this time.          Unique Patient/Family Needs:  Establishing effective communication pattern with patient and his parents.      Spirituality/Lottie Affiliation: Patient does not identify with lottie community        Communication Preferences: Artemio wants all medical communication and decisions to go through him. He prefers direct to the point  "communication. He does not like to be bothered with examinations and questions if they're not necessary.              Caregiver Plan: Parents, Artemio's primary care givers will be Dangelo Mervat and step-mom Camryn. He will be staying at his dad and step-mom's house after discharge.  continuing to work with Artemio's parents on this plan. Initially it was reported by other family members that Dangelo preferred not to have Mervat at their house when it was her turn to provide care giving duties and that same individual reported that Mervat preferred not to be at Dangelo and Camryn's house. The other most likely option would be for Isael to go back and forth from Dangelo and Camryn's house to Mervat's apartment. Edwige advised them to come up with a plan based on what was best for Artemio, what his preferences are, and what their family can manage.       Patient & Caregiver Knowledge of Medical Condition and Plan of Care: Artemio and his family are understanding of his medical condition and plan of care for BMT.      Patient and Caregiver Transplant Goals: Artemio and his parents state they \"Just want to get through it\".              Patient Personality/Communication/Coping/Interests/Activities: approachable, withdrawn and quiet. Patient states that when he doesn't feel good he irish by playing video games, watching movies or TV and sleeping. He also states he's not a morning person and prefers not to be bothered at night or if he's napping. He requested nursing bundle cares when possible. Edwige explained that the nurses try to be as accommodating as possible to patient preferences but it is not always possible due to the large number tasks they need to complete every day with every patient.      Patient Education/Developmental Level: Patient graduated from highschool approximately a year ago and had been taking some time off before deciding about college. He plans to revisit applying to higher education once " he is done with the BMT process.              Logistical Considerations:  Transportation: Private Car  Parking: at this time parents state they can afford to buy monthly parking passes.  Housing: patient and family is local.  Mervat has rented a short term apartment in [place] until Artemio is through transplant and she can return to [place].          Financial: Patient and his parents state that at this time they are able to meet their expenses with their current income. Because he essentially has 3 parents to share the care giving work, they all plan to continue working and therefore are hoping to maintain their current income.      Insurance:   Primary: [employer] [place], Camryn carries this insurance.  Secondary: [facility]  Unique Issues?:       Patient/Caregiver Sources of Income/Employment: Patient is not employed at this time due to his frequent treatment needs related to his ALL.   Patient's mom, Mervat, is a tele nurse and has started a short term part time casual position at [employer] in [place]. Patient's Dad, Dangelo, owns his own commercial painting business. Since he's the owner he has some flexibility in his schedule and can be away from work regularly. Patient's step mom, Camryn, is a nurse and works at one of the [employer] in the [employer].  Financial Concerns: none at this time. Artemio and his parents were encouraged to reach out if they had any financial concerns in the future.              Patient/Family Psychosocial Considerations:      Mental Health: No mental health issues identified         Chemical Health: No issues identified        Trauma/Loss/Abuse History: No identified issues        Legal Issues: No issues identified              Clinical Assessment and Recommendations:       Patient and family present as well-informed about and prepared for the treatment process. I did not identify any significant barriers to them managing the demands of  treatment.          Concerns: none identified at this time.      Education Provided: Transplant process expectations, Caregiver requirements, Caregiver self-care, Financial issues related to transplant, Financial resources/grants available, Common psychosocial stressors pre/post transplant, Hopsital resources available and Social work role        Interventions Provided:   Education and counseling related to psychosocial issues and resources      Follow up planned:  Psychosocial support      Electronically signed by Lance Weber LICSW at 11/23/2018 12:55 PM

## 2018-12-03 NOTE — PATIENT INSTRUCTIONS
Return to [facility] for labs and exam with an POLLO on Weds 12/5 and Dr Zavala on Fri 12/7. Please hold tacro prior to visit for blood drug level, and take this medication after level obtained.    Infusion needs: possible PRBCs on Friday 12/7    Patient has PICC, Central line, CVC line, to be drawn off of per lab.     Medication changes:   - decrease Ativan to 1 mg (1 tablet) in the morning, and 0.5 mg (half tablet) in the evening  - change Marinol dosing times to 10a and 4p  - Begin Bactrim dosing today-- take 1 tablet by mouth in the AM and 1 tablet by mouth in the PM ONLY on Mondays and Tuesdays  - will send prescription for Triamcinolone ointment    Care plan changes: none    Contact information  During business hours (7:30am-4:30pm):   To leave a non-urgent voicemail: call triage line [phone]    To call for time-sensitive needs or concerns : call clinic  [phone]    Evenings after 4:30pm, weekends, and holidays:   For any needs or concerns: call for BMT fellow at [phone] in the case of an emergency    Thank you!   Scheduled on 12/05/2018 at 9:30 with Candido Han for exam and labs  Scheduled with Dr Zavala on 12/07/20187 at 11:30 for exam and labs  Scheduled infusion for PRBC on 12/07/2018 at 12:30pm as of 12/04/2018 at 10:30am LIVIA

## 2018-12-03 NOTE — NURSING NOTE
Chief Complaint   Patient presents with     RECHECK     Patient is here today for ALL follow up     /63 (BP Location: Right arm, Patient Position: Fowlers, Cuff Size: Adult Small)  Pulse 101  Temp 98.8  F (37.1  C) (Axillary)  Resp 20  Wt 63 kg (138 lb 14.2 oz)  SpO2 100%  BMI 20.81 kg/m2    Donna Andrews LPN  December 3, 2018

## 2018-12-03 NOTE — MR AVS SNAPSHOT
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   After hours call [phone] and ask for the pediatric BMT physician to be paged for you.              Care Instructions    Return to [facility] for labs and exam with an POLLO on Weds 12/5 and Dr Zavala on Fri 12/7. Please hold tacro prior to visit for blood drug level, and take this medication after level obtained.    Infusion needs: possible PRBCs on Friday 12/7    Patient has PICC, Central line, CVC line, to be drawn off of per lab.     Medication changes:   - decrease Ativan to 1 mg (1 tablet) in the morning, and 0.5 mg (half tablet) in the evening  - change Marinol dosing times to 10a and 4p  - Begin Bactrim dosing today-- take 1 tablet by mouth in the AM and 1 tablet by mouth in the PM ONLY on Mondays and Tuesdays  - will send prescription for Triamcinolone ointment    Care plan changes: none    Contact information  During business hours (7:30am-4:30pm):   To leave a non-urgent voicemail: call triage line [phone]    To call for time-sensitive needs or concerns : call clinic  [phone]    Evenings after 4:30pm, weekends, and holidays:   For any needs or concerns: call for BMT fellow at [phone] in the case of an emergency    Thank you!           Follow-ups after your visit         Your next 10 appointments already scheduled     Dec 05, 2018  9:30 AM CST   Ump Bmt Peds Return with Candido Han PA-C   Peds Blood and Marrow Transplant ([facility])    [facility]  9th Floor  [address] [place] [address]   [phone]            Dec 07, 2018 11:30 AM CST   Ump Bmt Peds Anniversary Visit with Viky Zavala MD   Peds Blood and Marrow Transplant ([facility])    [place] [address]  [address] [place] [address]   [phone]            Dec 14, 2018 10:00 AM CST   Ump Bmt Peds Anniversary Visit with Viky Zavlaa MD   Peds Blood and Marrow Transplant ([facility])    [place] [address]  [address] [place] [address]   [phone]            Dec 19, 2018  8:00 AM CST   Peds BMT Eval with Carline Vaughan, PT   [facility] - Outpatient ([facility])    [address] [place], [place] [address]d Room [address]  [facility] [address]   [phone]            Dec 21, 2018 11:00 AM CST   Ump Bmt Peds Anniversary Visit with Viky Zavala MD   Peds Blood and Marrow Transplant ([facility])    [place] [address] [address]   [phone]            Dec 28, 2018 10:00 AM CST   Ump Bmt Peds Anniversary Visit with Viky Zavala MD   Peds Blood and Marrow Transplant ([facility])    [facility]  9th Floor  [address] [place] [address]   [phone]              Future tests that were ordered for you today     Open Standing Orders        Priority Remaining Interval Expires Ordered    Red blood cell prepare order unit Routine 99/100 CONDITIONAL (SPECIFY) BLOOD  11/30/2018          Open Future Orders        Priority Expected Expires Ordered    CBC with platelets differential Routine 12/5/2018 6/1/2019 12/3/2018    Basic metabolic panel  Routine 12/5/2018 6/1/2019 12/3/2018    Magnesium Routine 12/5/2018 6/1/2019 12/3/2018    Phosphorus Routine 12/5/2018 12/3/2019 12/3/2018    Tacrolimus level Routine 12/5/2018 12/3/2019 12/3/2018            Who to contact     Please call your clinic at [phone] to:    Ask questions about your health    Make or cancel appointments    Discuss your medicines    Learn about your test results    Speak to your doctor            Additional Information About Your Visit        MobPanel Information     Mob[employer] gives you secure access to your electronic health record. If you see a primary care provider, you can also send messages to your care team and make appointments. If you have questions, please call your primary care clinic.  If you do not have a primary care provider, please call [phone] and they will assist you.      M[employer] is an electronic gateway that provides easy, online access to your medical records. With [employer], you can request a clinic appointment, read your test results, renew a prescription or communicate with your care team.     To access your existing account, please contact your [facility] or call [phone] for assistance.        Care EveryWhere ID     This is your Care EveryWhere ID. This could be used by other organizations to access your [facility] medical records  [id number]        Your Vitals Were     Pulse Temperature Respirations Pulse Oximetry BMI (Body Mass Index)       101 98.8  F (37.1  C) (Axillary) 20 100% 20.81 kg/m2        Blood Pressure from Last 3 Encounters:   12/03/18 105/63   11/30/18 108/77   11/28/18 117/67    Weight from Last 3 Encounters:   12/03/18 63 kg (138 lb 14.2 oz)   11/30/18 63.3 kg (139 lb 8.8 oz)   11/28/18 63.3 kg (139 lb 8.8 oz)                 Today's Medication Changes          These changes are accurate as of 12/3/18  2:26 PM.  If you have any questions, ask your nurse or doctor.               Start taking these medicines.         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   Quantity:  453.6 g   Refills:  0         These medicines have changed or have updated prescriptions.        Dose/Directions    LORazepam 1 MG tablet   Commonly known as:  ATIVAN   This may have changed:    - how much to take  - how to take this  - when to take this  - additional instructions   Used for:  Nausea   Changed by:  Janee Olmos PA-C        Take 1 tablet by mouth in the morning, and 0.5 tablets by mouth in the evening   Quantity:  60 tablet   Refills:  0            Where to get your medicines      These medications were sent to [id number]  [address]  [address], [place] [address]     Phone:  [phone]     triamcinolone 0.1 % external ointment                Primary Care Provider Office Phone # Fax #    Janee Olmos PA-C [phone]       [facility] PEDS BLOOD [facility]ARROW [place] [address] [facility] [address]        Equal Access to Services     CHARY MEJIAS : Celia fieldo Soshady, waaxda luqadaha, qaybta kaalmada adeegyada, jessy madera. So [facility] [phone].    ATENCIÓN: Si habla español, tiene a london disposición servicios gratuitos de asistencia lingüística. Llame al [phone].    We comply with applicable federal civil rights laws and [place] laws. We do not discriminate on the basis of race, color, national origin, age, disability, sex, sexual orientation, or gender identity.            Thank you!     Thank you for choosing [facility]  for your care. Our goal is always to provide you with excellent care. Hearing back from our patients is one way we can continue to improve our services.  Please take a few minutes to complete the written survey that you may receive in the mail after your visit with us. Thank you!             Your Updated Medication List - Protect others around you: Learn how to safely use, store and throw away your medicines at www.disposemymeds.org.          This list is accurate as of 12/3/18  2:26 PM.  Always use your most recent med list.                   Brand Name Dispense Instructions for use Diagnosis    acetaminophen 325 MG tablet    TYLENOL    100 tablet    Take 2 tablets (650 mg) by mouth every 4 hours as needed for mild pain (discomfort with fever, fever of 102.5 or greater.)    Generalized pain       dronabinol 2.5 MG capsule    MARINOL    60 capsule    Take 1 capsule (2.5 mg) by mouth 2 times daily    Nausea, Anorexia       emollient external cream     453 g    Apply topically 4 times daily    Rash and nonspecific skin eruption       granisetron 1 MG tablet    KYTRIL    60 tablet    Take 1 tablet (1 mg) by mouth every 12 hours    Nausea       hydrocortisone 1 % external ointment    CORTIZONE-10    56 g    Apply sparingly to affected area, rash on neck three times a day.    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), Bone marrow transplant candidate       LORazepam 1 MG tablet    ATIVAN    60 tablet    Take 1 tablet by mouth in the morning, and 0.5 tablets by mouth in the evening    Nausea       melatonin 3 MG tablet     30 tablet    Take 1 tablet (3 mg) by mouth At Bedtime    Insomnia, unspecified type       mycophenolate 500 MG tablet    GENERIC EQUIVALENT    66 tablet    Take 2 tablets (1,000 mg) by mouth 3 times daily for 11 days    S/P allogeneic bone marrow transplant (H), At 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       oxyCODONE 5 MG tablet    ROXICODONE     40 tablet    Take 1 tablet (5 mg) by mouth 2 times daily    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), Bone marrow transplant candidate       pantoprazole 40 MG EC tablet    PROTONIX    30 tablet    Take 1 tablet (40 mg) by mouth 2 times daily    S/P allogeneic bone marrow transplant (H), Nausea, On total parenteral nutrition       sulfamethoxazole-trimethoprim 800-160 MG tablet    BACTRIM DS/SEPTRA DS    20 tablet    Take 1 tablet by mouth Every Mon, Tues two times daily    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 Total dose is 350 mg twice daily.    S/P allogeneic bone marrow transplant (H), At risk for infection       * voriconazole 200 MG tablet    VFEND    60 tablet    Take three 50 mg tablets and one 200 mg table twice daily. Total dose is 350 mg twice daily.    S/P allogeneic bone marrow transplant (H), At risk for infection       * Notice:  This list has 2 medication(s) that are the same as other medications prescribed for you. Read the directions carefully, and ask your doctor or other care provider to review them with you.

## 2018-12-04 ENCOUNTER — TELEPHONE (OUTPATIENT)
Dept: TRANSPLANT | Facility: CLINIC | Age: 18
End: 2018-12-04

## 2018-12-04 ENCOUNTER — TELEPHONE (OUTPATIENT)
Dept: PEDIATRIC HEMATOLOGY/ONCOLOGY | Facility: CLINIC | Age: 18
End: 2018-12-04

## 2018-12-04 ENCOUNTER — HOME INFUSION (PRE-WILLOW HOME INFUSION) (OUTPATIENT)
Dept: PHARMACY | Facility: CLINIC | Age: 18
End: 2018-12-04

## 2018-12-04 DIAGNOSIS — Z94.81 S/P ALLOGENEIC BONE MARROW TRANSPLANT (H): ICD-10-CM

## 2018-12-04 DIAGNOSIS — Z91.89 AT RISK FOR INFECTION: ICD-10-CM

## 2018-12-04 LAB
BLD PROD TYP BPU: NORMAL
BLD UNIT ID BPU: 0
BLOOD PRODUCT CODE: NORMAL
BPU ID: NORMAL
TRANSFUSION STATUS PATIENT QL: NORMAL
TRANSFUSION STATUS PATIENT QL: NORMAL

## 2018-12-04 RX ORDER — VORICONAZOLE 50 MG/1
TABLET, FILM COATED ORAL
Qty: 180 TABLET | Refills: 3 | COMMUNITY
Start: 2018-12-04 | End: 2018-12-05

## 2018-12-04 RX ORDER — VORICONAZOLE 200 MG/1
400 TABLET, FILM COATED ORAL 2 TIMES DAILY
Qty: 60 TABLET | Refills: 3 | COMMUNITY
Start: 2018-12-04 | End: 2018-12-05

## 2018-12-04 NOTE — TELEPHONE ENCOUNTER
Efforts were made to reach family via phone call however there was no answer and no voicemail set up.  Email was sent to Camryn Ulloa and Mervat with RN coordinator Daria bergeron'ed.    Shared that his voriconazole level was subtherapeutic and instructed to increase the dosing to 400mg BID with plan to repeat the level at their 12/14 appointment with Dr. Zavala.     Lilia OscarCarlsbad Medical CenterCinthia Costa MS, PA-C  Pediatric Blood and Marrow Transplant  [facility]  Pager [phone]  [facility] Phone: [phone]  [facility] Fax: [phone]

## 2018-12-04 NOTE — PROGRESS NOTES
This is a recent snapshot of the patient's [facility] medical record.  For current drug dose and complete information and questions, call [phone] or In Basket pool, [facility] home infusion ([id number])  CSN Number:  [id number]

## 2018-12-04 NOTE — PHARMACY-PHARMACOTHERAPY NOTE
Voriconazole Monitoring Note   D: Current voriconazole dose: 350 mg PO BID (5.6 mg/kg/dose)  Voriconazole Level: 1.1   Goal Voriconazole trough level: 1.5-5 mg/L (treatment failure has been reported with levels <1.5; neuro- and hepato-toxicity seen with levels > 5.5).   A: Current trough level is slightly below the desired range.   Significant drug interactions include: tacrolimus   P: Increase dose to 400 mg PO BID (6.4 mg/kg/dose).  Discussed recommendations with Lilia Costa.  Recheck trough level in 5-10 days.  Pharmacy team will continue to follow.    Margaret Kelly, PharmD

## 2018-12-04 NOTE — TELEPHONE ENCOUNTER
Mother called the clinic after receiving the email with dosing change instructions (vfend).  The most recent med list she had in her possession states that he is taking 300mg BID (not 350mg BID).  She shares that he is currently with his dad so she is not certain of what dose he is actually taking.  I asked her to touch base with Artemio and dad to confirm the dose--then call the clinic back.      She called back as instructed and confirmed that Artemio is currently taking 350mg BID.  He will increase the dose as previously instructed, to 400mg BID and we will repeat the level on 12/14.      Lilia Costa MS (Rusch), PARichaC  Pediatric Blood and Marrow Transplant  [facility]  Pager [phone]  [facility] Phone: [phone]  [facility] Fax: [phone]

## 2018-12-05 ENCOUNTER — TELEPHONE (OUTPATIENT)
Dept: PEDIATRIC HEMATOLOGY/ONCOLOGY | Facility: CLINIC | Age: 18
End: 2018-12-05

## 2018-12-05 ENCOUNTER — ONCOLOGY VISIT (OUTPATIENT)
Dept: TRANSPLANT | Facility: CLINIC | Age: 18
End: 2018-12-05
Attending: PHYSICIAN ASSISTANT
Payer: COMMERCIAL

## 2018-12-05 ENCOUNTER — TELEPHONE (OUTPATIENT)
Dept: TRANSPLANT | Facility: CLINIC | Age: 18
End: 2018-12-05

## 2018-12-05 ENCOUNTER — ALLIED HEALTH/NURSE VISIT (OUTPATIENT)
Dept: TRANSPLANT | Facility: CLINIC | Age: 18
End: 2018-12-05

## 2018-12-05 VITALS
HEIGHT: 69 IN | SYSTOLIC BLOOD PRESSURE: 104 MMHG | HEART RATE: 98 BPM | OXYGEN SATURATION: 99 % | RESPIRATION RATE: 16 BRPM | DIASTOLIC BLOOD PRESSURE: 66 MMHG | BODY MASS INDEX: 20.31 KG/M2 | TEMPERATURE: 98.4 F | WEIGHT: 137.13 LBS

## 2018-12-05 DIAGNOSIS — Z76.82 BONE MARROW TRANSPLANT CANDIDATE: ICD-10-CM

## 2018-12-05 DIAGNOSIS — Z91.89 AT RISK FOR INFECTION: ICD-10-CM

## 2018-12-05 DIAGNOSIS — Z71.9 ENCOUNTER FOR COUNSELING: Primary | ICD-10-CM

## 2018-12-05 DIAGNOSIS — Z94.81 S/P ALLOGENEIC BONE MARROW TRANSPLANT (H): ICD-10-CM

## 2018-12-05 DIAGNOSIS — Z91.89 AT RISK FOR GRAFT VERSUS HOST DISEASE: ICD-10-CM

## 2018-12-05 DIAGNOSIS — C91.01 ACUTE LYMPHOBLASTIC LEUKEMIA (ALL) IN REMISSION (H): Primary | ICD-10-CM

## 2018-12-05 LAB
ANION GAP SERPL CALCULATED.3IONS-SCNC: 8 MMOL/L (ref 3–14)
ANISOCYTOSIS BLD QL SMEAR: ABNORMAL
BASOPHILS # BLD AUTO: 0.1 10E9/L (ref 0–0.2)
BASOPHILS NFR BLD AUTO: 1.8 %
BUN SERPL-MCNC: 19 MG/DL (ref 7–30)
CALCIUM SERPL-MCNC: 8.9 MG/DL (ref 8.5–10.1)
CHLORIDE SERPL-SCNC: 109 MMOL/L (ref 94–109)
CO2 SERPL-SCNC: 23 MMOL/L (ref 20–32)
CREAT SERPL-MCNC: 0.74 MG/DL (ref 0.66–1.25)
DIFFERENTIAL METHOD BLD: ABNORMAL
EOSINOPHIL # BLD AUTO: 0.1 10E9/L (ref 0–0.7)
EOSINOPHIL NFR BLD AUTO: 1.8 %
ERYTHROCYTE [DISTWIDTH] IN BLOOD BY AUTOMATED COUNT: 16.7 % (ref 10–15)
GFR SERPL CREATININE-BSD FRML MDRD: >90 ML/MIN/1.7M2
GLUCOSE SERPL-MCNC: 122 MG/DL (ref 70–99)
HCT VFR BLD AUTO: 28.6 % (ref 40–53)
HGB BLD-MCNC: 9.8 G/DL (ref 13.3–17.7)
LYMPHOCYTES # BLD AUTO: 0.3 10E9/L (ref 0.8–5.3)
LYMPHOCYTES NFR BLD AUTO: 9.8 %
MAGNESIUM SERPL-MCNC: 2 MG/DL (ref 1.6–2.3)
MCH RBC QN AUTO: 30.1 PG (ref 26.5–33)
MCHC RBC AUTO-ENTMCNC: 34.3 G/DL (ref 31.5–36.5)
MCV RBC AUTO: 88 FL (ref 78–100)
MICROCYTES BLD QL SMEAR: PRESENT
MONOCYTES # BLD AUTO: 0.6 10E9/L (ref 0–1.3)
MONOCYTES NFR BLD AUTO: 17 %
NEUTROPHILS # BLD AUTO: 2.4 10E9/L (ref 1.6–8.3)
NEUTROPHILS NFR BLD AUTO: 69.6 %
PHOSPHATE SERPL-MCNC: 4 MG/DL (ref 2.5–4.5)
PLATELET # BLD AUTO: 125 10E9/L (ref 150–450)
PLATELET # BLD EST: ABNORMAL 10*3/UL
POTASSIUM SERPL-SCNC: 4.2 MMOL/L (ref 3.4–5.3)
RBC # BLD AUTO: 3.26 10E12/L (ref 4.4–5.9)
SODIUM SERPL-SCNC: 140 MMOL/L (ref 133–144)
TACROLIMUS BLD-MCNC: 10.3 UG/L (ref 5–15)
TME LAST DOSE: NORMAL H
WBC # BLD AUTO: 3.5 10E9/L (ref 4–11)

## 2018-12-05 PROCEDURE — 85025 COMPLETE CBC W/AUTO DIFF WBC: CPT | Performed by: PHYSICIAN ASSISTANT

## 2018-12-05 PROCEDURE — G0463 HOSPITAL OUTPT CLINIC VISIT: HCPCS | Mod: ZF

## 2018-12-05 PROCEDURE — 83735 ASSAY OF MAGNESIUM: CPT | Performed by: PHYSICIAN ASSISTANT

## 2018-12-05 PROCEDURE — 84100 ASSAY OF PHOSPHORUS: CPT | Performed by: PHYSICIAN ASSISTANT

## 2018-12-05 PROCEDURE — 80197 ASSAY OF TACROLIMUS: CPT | Performed by: PHYSICIAN ASSISTANT

## 2018-12-05 PROCEDURE — 36592 COLLECT BLOOD FROM PICC: CPT | Performed by: PHYSICIAN ASSISTANT

## 2018-12-05 PROCEDURE — 80048 BASIC METABOLIC PNL TOTAL CA: CPT | Performed by: PHYSICIAN ASSISTANT

## 2018-12-05 RX ORDER — OXYCODONE HYDROCHLORIDE 5 MG/1
5 TABLET ORAL DAILY
Qty: 40 TABLET | Refills: 0 | COMMUNITY
Start: 2018-12-05 | End: 2018-12-14

## 2018-12-05 RX ORDER — VORICONAZOLE 200 MG/1
400 TABLET, FILM COATED ORAL 2 TIMES DAILY
Qty: 60 TABLET | Refills: 3 | COMMUNITY
Start: 2018-12-05 | End: 2018-12-11

## 2018-12-05 ASSESSMENT — PAIN SCALES - GENERAL: PAINLEVEL: MODERATE PAIN (4)

## 2018-12-05 NOTE — PHARMACY-CONSULT NOTE
Outpatient TPN Monitoring  Artemio's TPN formula, labs, and nutritional status were reviewed today. The following TPN changes are recommended: NO CHANGES  Discussed with HANNAH Santos and communicated to [facility].     Artemio will return to clinic on Friday, 12/7 for labs and reassessment.      The following reflects the current TPN formula:  Dosing Weight: 65.1 kg  Volume: 1390 ml, cycled over 12 hours  Protein: 98 g/day   Dextrose: 255 g  Lipids: 240 mL      Sodium: 50 mEq/day  Potassium:   10 mEq/day  Calcium: 5 mEq/day  Magnesium:  45 mEq/day  Phosphorus:  0 mmol/day  Chloride:Acetate ratio:  1:1  Trace elements with Selenium: standard  Multivitamins: standard  Vitamin K:  2.5 mg/day      Pharmacy will continue to follow.  Cathryn Jennissen, PharmD, BCOP

## 2018-12-05 NOTE — PROGRESS NOTES
"[facility]   PEDIATRIC BMT SOCIAL WORK PROGRESS NOTE  DATA:      met with Isael and his parents as part of their post-BMT follow up clinic appointment with a provider on Wed. 12/5/18 and Friday 12/7/18 for brief supportive check ins.   Their main concerns this week were that the treatment team kept calling Isael directly with minor med management updates, dosing changes, etc. And Isael wasn't answering his phone, partially because he often doesn't feel good and partially because he generally isn't very good at answering his phone or returning missed calls. Edwige asked Isael if he wanted one of his parents to be the primary contact instead of himself and he stated that \"yes\" he did. When asked which parent he wanted to be the primary contact who would get all initial calls from the treatment team he stated he wanted his step-mom Camryn as he was living with Camryn and his dad Dangelo and Camryn was with him the most and the one primarily managing his meds and TPN. Edwige worked with  of peds BMT team to get this change updated in the EPIC system so all providers would know not to call Isael but to call his Step-mom Camryn instead.   also compiled the list of his caregivers names, phone numbers and emails and added them to the demographics tab in EPIC, specifically under the \"Clinical Information sub-tab at the bottom under Permanent comments.  Please refer to this section when contacting Isael's parents/caregivers via email. All parents/caregivers should be included on emails to the patient/family from the treatment team.         INTERVENTION:      Brief supportive counseling  Logistical support re: streamlining team to patient contact process.    ASSESSMENT:      Patient and parents appear to be coping adequately at this time.     PLAN:    will provide ongoing psychosocial support to patient and family as " needed.      REJI Barnes, [facility]    Pediatric Blood and Marrow Transplant  [phone]  [email or web address]      12/7/2018   3:50 PM

## 2018-12-05 NOTE — PROGRESS NOTES
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 He is here today with his mother for follow up care and labs. He remains afebrile. He has been applying TMC 0.1% ointment to his arms but the rash has spread to his chest and is more pruritic. Mother called BMT fellow last evening and was instructed to hold bactrim until further assessed. Will attempt to schedule clinic appointment with Dermatology on 12/6. Intermittent abdominal discomfort persists. Isael points to the LLQ as the site of discomfort. No emesis. Requires nutritional support with TPN. Poor appetite. Remains on marinol as an appetite stimulant. Hx of c diff, diarrhea resolved. Tacrolimus level today. Voriconazole dose increased 12/4.     Review of Systems: Pertinent positives include those mentioned in interval events. A complete review of systems was performed and is otherwise negative.       Medications:  Please see MAR     Physical Exam:  /66 (BP Location: Left arm, Patient Position: Chair, Cuff Size: Adult Regular)  Pulse 98  Temp 98.4  F (36.9  C) (Oral)  Resp 16  Ht 1.754 m (5' 9.05\")  Wt 62.2 kg (137 lb 2 oz)  SpO2 99%  BMI 20.22 kg/m2  FEN: Sitting on exam table. Awake and alert and in no apparent distress. Mother present.   HEENT: Alopecia, normocephalic, atraumatic, sclera clear, MMM, oral lesions essentially resolved, nares patent without discharge    CARD: Heart regular rate and rhythm. No murmurs, rubs or gallops. Cap refill 2 seconds.  RESP: Lungs clear in upper lobes, diminished in bases, shallow respirations.  No increased work of breathing, crackles or wheezes.   ABD: Non-distended, non-tender, ticklish bowel sounds present  EXTREM: No edema noted  SKIN: Scattered erythematous confluent macular rash over bilateral dorsal forearms with patchy involvement of " upper abdomen and chest.     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 He is currently BMT Transplant Date: BMT Txp 11/2/2018 (33 days).  Intermittent abdominal pain persists. Rash spreading on TMC 0.1% ointment and involving arms and chest and more pruritic. Derm appointment on 12/6.    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  Conditioning per 2015-29: TBI days -4 through -1 followed by matched sibling donor transplant on 11/2   - Engrafted day +15, PB VNTR 100% donor, unseparated  - bone marrow biopsy 11/27, 20-30% donor with trilineage hematopoesis, flow & FISH negative, 100% donor. Chromosomes pending.      # Risk for GVHD: Post-transplant Cytoxan on days +3 and +4 complete   - Continue MMF through day +35 (through Friday 12/7)  - Continue tacrolimus (goal 5-10). Repeat level pending today. Voriconazole dose increased 12/4.     FEN/Renal:  # Risk for malnutrition: continue TPN/IL, cycled over 12 hours. Small amount of PO intake.  - weight was trending down, now increasing (lowest he's been at 59.6kg)  - continue Kcal counts  - Continue marinol BID for appetite stimulation  - dietician following      # Risk for electrolyte abnormalities: stable today  - Check daily electrolytes. Given cardiac history (WPW), keep K>3.5, Mg>1.8, iCa nml.  - Phos stable at 4.0 today, none in TPN   - Mag and K sliding scale       # Risk for renal dysfunction and fluid overload:  - monitor I/O's and daily weights       # Risk for aHUS/TA-TMA:    - monitor LDH qMonday/Thursday post-BMT until outpatient then weekly through day +100, to date within acceptable range  - monitor urine protein/creatinine qTuesday  through day +100, to date within acceptable range      Pulmonary:  # Risk for pulmonary toxicity secondary to chemotherapy:   - monitor respiratory function per unit routine      # Rhinorrhea: 3 mos history, mild. Sinus CT (10/22) with findings of mucosal thickening without evidence of active infection. RVP negative.      # Pneumomediastinum: finding upon work up Chest CT (10/22), asymptomatic. Per radiology, likely benign, perhaps transient.   - Patient instructed to notify care team with any SOB, dyspnea, or remarkable cough  - none over last several days      Cardiovascular:  # Aaron-Parkinson-White Syndrome: diagnosed upon syncope in 02/2018. Asymptomatic, no interventions to date. Work up EKG c/w WPW, sinus bradycardia at 49 bpm. Work up ECHO normal with EF 68%.   Cardiology visit with Dr. Plummer (10/22): Holter revealed 10 PACs with no evidence of AV block. Normal Echocardiogram with EF 68%. Isael has hx of HR in high 30s with sleep.  - Avoid catecholinergics as able to avoid tachycardia  - Maintain normal iCa+, mag > 1.8, K > 3.5      # Risk for hypertension secondary to medications:   - no need for PRNs at this time as has been running low      Heme:   # Pancytopenia secondary to chemotherapy:   - Transfuse for hemoglobin < 8, platelets < 10,000 as now off of defibrotide, no premedications  - GCSF now PRN for ANC < 1000      Infectious Disease:  # Risk for infection given immunocompromised status:   Active: Afebrile  - Central line exit site cultured 11/20: Negative     Prophylaxis:                                                                                                                        - viral prophylaxis: CMV neg (donor CMV positive), HSV positive. Valtrex. Weekly testing 11/18 negative  - fungal prophylaxis: high risk - Voriconazole level subtherapeutic and dose increased on 12/4. Repeat level in one week.  - bacterial prophylaxis: Bactrim initiated 12/3 but was placed on hold secondary to  rash. Reassess following derm appointment.     # C.difficile colitis: Diarrhea resolved per Isael  - discontinued oral vanco 11/25, monitor stools      GI:   # Nausea management: Nausea stable overall but intermittent abdominal pain persists. Consider US at next visit. Isael did not want US today.  - Scheduled medications: Kytril Q12H, ativan q 12 both prior to medications, both now oral  - Will need ativan wean as outpatient -- consider next wean on 12/7.  - PRN medications: Ativan (also for anxiety), benadryl       # Risk for VOD: especially high risk-- previous Inotuzumab therapy.  Defibrotide ppx stopped 11/22  - stopped ursodiol 11/26, and monitor LFTs     # Hemorrhoids: preparation H prn, has not complained of related discomfort in several days.     ENT:  # Epistaxis: occasional drips, nasal mucosa dry  - Ayr gel PRN  - Vaseline to nares      Neuro:  # Mucositis/pain: generally improving/resolved  - continues with abdominal cramping though improving from previous. Pain is sporadic and intense but self -resolving  - Oxycodone weaned from bid to daily today.   - Encourage warm packs, moving around, and monitoring if abd discomfort happens around stooling or not  - Integrative therapy      # Pruritis: resolved in anal area, now with rash on neck  - benadryl and hydroxyzine PRN     Derm:  # Skin breakdown, perianal area: no current concerns, and last assessed by Kale and Dr. Alcaraz on 11/22     # Insomnia: difficulty sleeping inpatient, with abdominal cramping and overnight interruptions. Now improving.   - improved with compliance with set schedule inpt, see previous notes for details  - Continue melatonin 3 mg at bedtime-- okay to take PRN as sleep now improved since discharge    # Rash: erythematous, confluent rash over neck (resolved) and bilateral dorsal forearms (worse) and now involving chest.   - Continues hydrocortisone cream QID to neck and triamcinolone ointment application QID to arms and chest.   -  Dermatology evaluation 12/6     Social  - actively involved in creating an effective plan of care post discharge, parents are not  and communication appears challenging. Completed multidisciplinary team meeting with family on 11/21 with social work and RN coordinator  -Mother has concern of historical non-compliance with medication and cares and this continues to be addressed/reinforced by team as able    Disposition: RTC  12/7 for labwork and provider visit      Candido Han PA-C  Pediatric Blood and Marrow Transplant Program  [facility]      Patient Active Problem List    Diagnosis Date Noted     S/P allogeneic bone marrow transplant (H) 11/23/2018     Priority: Medium     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) 10/28/2018     Priority: Medium     At risk for infection 10/28/2018     Priority: Medium     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) 10/22/2018     Priority: Medium     Aaron-Parkinson-White (WPW) syndrome 10/22/2018     Priority: Medium     Bone marrow transplant candidate 10/22/2018     Priority: Medium

## 2018-12-05 NOTE — MR AVS SNAPSHOT
After Visit Summary   12/5/2018    Artemio Nino    MRN: [id number]           Patient Information     Date Of Birth          2000        Visit Information        Provider Department      12/5/2018 9:30 AM Candido Han PA-C Peds Blood and Marrow Transplant        Today's Diagnoses     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)    -  1    S/P allogeneic bone marrow transplant (H)        At risk for infection        Bone marrow transplant candidate              [facility], [address] [address]  Phone: [phone]  Clinic Hours:   Monday-Friday:   7 am to 5:00 pm   closed weekends and major  holidays     If your fever is 100.5  or greater,   call the clinic during business hours.   After hours call [phone] and ask for the pediatric BMT physician to be paged for you.               Follow-ups after your visit        Your next 10 appointments already scheduled     Dec 06, 2018 12:45 PM CST   New Patient Visit with Jazmín Wang MD   Peds Dermatology ([facility])    [facility]  [address] [address]   [phone]            Dec 07, 2018 11:30 AM CST   (Arrive by 11:00 AM)   [facility] Bmt Peds Anniversary Visit with Viky Zavala MD   Peds Blood and Marrow Transplant ([facility])    [facility]  [address] [address]   [phone]            Dec 07, 2018 12:30 PM CST   [facility] Peds Infusion 180 with [facility] PEDS INFUSION CHAIR 3   Peds IV Infusion ([facility])    [facility]  [address] [address]   [phone]            Dec 14, 2018 10:00 AM CST   (Arrive by 9:30 AM)   [facility] Bmt Peds Anniversary Visit with Viky Zavala MD   Peds Blood and Marrow Transplant ([facility])    [place] [address]  [facility] [address]   [phone]            Dec 19, 2018  8:00 AM CST   Peds BMT Eval with Carline Vaughan, PT   [facility] - Outpatient ([facility])    [address] [place], [place] [address]d [address]  [facility] [address]   [phone]            Dec 21, 2018 11:00 AM CST   (Arrive by 10:30 AM)   [facility] Bmt Peds Anniversary Visit with Viky Zavala MD   Peds Blood and Marrow Transplant ([facility])    [place] [address] [address]   [phone]            Dec 28, 2018 10:00 AM CST   (Arrive by 9:30 AM)   [facility] Bmt Peds Anniversary Visit with Viky Zavala MD   Peds Blood and Marrow Transplant ([facility])    [address] [address]   [phone]            Jan 03, 2019  2:30 PM CST   (Arrive by 2:00 PM)   [facility] Bmt Peds Anniversary Visit with Viky Zavala MD   Peds Blood and Marrow Transplant ([facility])    [address] [address]   [phone]            Jan 11, 2019 12:45 PM CST   (Arrive by 12:15 PM)   [facility] Bmt Peds Anniversary Visit with Candido Han PA-C   Peds Blood and Marrow Transplant ([facility])    [place] [address] [address]   [phone]            Jan 18, 2019 10:30 AM CST   (Arrive by 10:00 AM)   [facility] Bmt Peds Anniversary Visit with Viky Zavala MD   Peds Blood and Marrow Transplant ([facility])    [place] [address]  [address] [place] [address]   [phone]              Future tests that were ordered for you today     Open Future Orders        Priority Expected Expires Ordered    CBC with platelets differential Routine 12/7/2018 12/21/2018 12/5/2018    Basic metabolic panel Routine 12/7/2018 12/21/2018  "12/5/2018    Magnesium Routine 12/7/2018 12/21/2018 12/5/2018    Phosphorus Routine 12/7/2018 12/21/2018 12/5/2018            Who to contact     Please call your clinic at [phone] to:    Ask questions about your health    Make or cancel appointments    Discuss your medicines    Learn about your test results    Speak to your doctor            Additional Information About Your Visit        [employer] Information     "[employer] gives you secure access to your electronic health record. If you see a primary care provider, you can also send messages to your care team and make appointments. If you have questions, please call your primary care clinic.  If you do not have a primary care provider, please call [phone] and they will assist you.      "[employer] is an electronic gateway that provides easy, online access to your medical records. With "[employer] you can request a clinic appointment, read your test results, renew a prescription or communicate with your care team.     To access your existing account, please contact your [facility] or call [phone] for assistance.        Care EveryWhere ID     This is your Care EveryWhere ID. This could be used by other organizations to access your [facility] medical records  [id number]        Your Vitals Were     Pulse Temperature Respirations Height Pulse Oximetry BMI (Body Mass Index)    98 98.4  F (36.9  C) (Oral) 16 1.754 m (5' 9.05\") 99% 20.22 kg/m2       Blood Pressure from Last 3 Encounters:   12/05/18 104/66   12/03/18 105/63   11/30/18 108/77    Weight from Last 3 Encounters:   12/05/18 62.2 kg (137 lb 2 oz)   12/03/18 63 kg (138 lb 14.2 oz)   11/30/18 63.3 kg (139 lb 8.8 oz)              We Performed the Following     Basic metabolic panel     CBC with platelets differential     Magnesium     Phosphorus     Tacrolimus level          Today's Medication Changes          These changes are accurate as of 12/5/18  4:21 PM.  If you have any " questions, ask your nurse or doctor.               These medicines have changed or have updated prescriptions.        Dose/Directions    oxyCODONE 5 MG tablet   Commonly known as:  ROXICODONE   This may have changed:  when to take this   Used for:  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), Bone marrow transplant candidate, S/P allogeneic bone marrow transplant (H), At risk for infection   Changed by:  Candido Han PA-C        Dose:  5 mg   Take 1 tablet (5 mg) by mouth daily   Quantity:  40 tablet   Refills:  0       voriconazole 200 MG tablet   Commonly known as:  VFEND   This may have changed:    - additional instructions  - Another medication with the same name was removed. Continue taking this medication, and follow the directions you see here.   Used for:  S/P allogeneic bone marrow transplant (H), 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), Bone marrow transplant candidate   Changed by:  Candido Han PA-C        Dose:  400 mg   Take 2 tablets (400 mg) by mouth 2 times daily   Quantity:  60 tablet   Refills:  3               Information about OPIOIDS     PRESCRIPTION OPIOIDS: WHAT YOU NEED TO KNOW   We gave you an opioid (narcotic) pain medicine. It is important to manage your pain, but opioids are not always the best choice. You should first try all the other options your care team gave you. Take this medicine for as short a time (and as few doses) as possible.    Some activities can increase your pain, such as bandage changes or therapy sessions. It may help to take your pain medicine 30 to 60 minutes before these activities. Reduce your stress by getting enough sleep, working on hobbies you enjoy and practicing relaxation or meditation. Talk to your care team about ways to manage your pain beyond prescription opioids.    These medicines have risks:    DO NOT drive when on new or higher doses of pain medicine. These medicines can affect your alertness and reaction  times, and you could be arrested for driving under the influence (DUI). If you need to use opioids long-term, talk to your care team about driving.    DO NOT operate heavy machinery    DO NOT do any other dangerous activities while taking these medicines.    DO NOT drink any alcohol while taking these medicines.     If the opioid prescribed includes acetaminophen, DO NOT take with any other medicines that contain acetaminophen. Read all labels carefully. Look for the word  acetaminophen  or  Tylenol.  Ask your pharmacist if you have questions or are unsure.    You can get addicted to pain medicines, especially if you have a history of addiction (chemical, alcohol or substance dependence). Talk to your care team about ways to reduce this risk.    All opioids tend to cause constipation. Drink plenty of water and eat foods that have a lot of fiber, such as fruits, vegetables, prune juice, apple juice and high-fiber cereal. Take a laxative (Miralax, milk of magnesia, Colace, Senna) if you don t move your bowels at least every other day. Other side effects include upset stomach, sleepiness, dizziness, throwing up, tolerance (needing more of the medicine to have the same effect), physical dependence and slowed breathing.    Store your pills in a secure place, locked if possible. We will not replace any lost or stolen medicine. If you don t finish your medicine, please throw away (dispose) as directed by your pharmacist. The [place] [employer]ution [employer] has more information about safe disposal: https://www.pca.[email or web address].[email or web address]/[email or web address]-unwanted-medications         Primary Care Provider Office Phone # Fax #    Janee Olmos PA-C [phone]       [facility] PEDS BLOOD MARROW [place] [address] [facility] [address]        Equal Access to Services     CHARY MEJIAS : scott [place] qaybta kaalmada adeegyada, waxay idiin hayaan adeeg kharash la'aan ah. So [facility]  [phone].    ATENCIÓN: Si iván esposito, tiene a london disposición servicios gratuitos de asistencia lingüística. Rita vizcaino [phone].    We comply with applicable federal civil rights laws and [place] laws. We do not discriminate on the basis of race, color, national origin, age, disability, sex, sexual orientation, or gender identity.            Thank you!     Thank you for choosing PEDS BLOOD AND MARROW TRANSPLANT  for your care. Our goal is always to provide you with excellent care. Hearing back from our patients is one way we can continue to improve our services. Please take a few minutes to complete the written survey that you may receive in the mail after your visit with us. Thank you!             Your Updated Medication List - Protect others around you: Learn how to safely use, store and throw away your medicines at www.disposemymeds.org.          This list is accurate as of 12/5/18  4:21 PM.  Always use your most recent med list.                   Brand Name Dispense Instructions for use Diagnosis    acetaminophen 325 MG tablet    TYLENOL    100 tablet    Take 2 tablets (650 mg) by mouth every 4 hours as needed for mild pain (discomfort with fever, fever of 102.5 or greater.)    Generalized pain       dronabinol 2.5 MG capsule    MARINOL    60 capsule    Take 1 capsule (2.5 mg) by mouth 2 times daily    Nausea, Anorexia       emollient external cream     453 g    Apply topically 4 times daily    Rash and nonspecific skin eruption       granisetron 1 MG tablet    KYTRIL    60 tablet    Take 1 tablet (1 mg) by mouth every 12 hours    Nausea       hydrocortisone 1 % external ointment    CORTIZONE-10    56 g    Apply sparingly to affected area, rash on neck three times a day.    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)       hydrOXYzine 10 MG/5ML syrup    ATARAX    1500 mL    Take 12.5 mLs (25 mg) by mouth every 8 hours as needed for itching    Acute lymphoblastic leukemia  (ALL) in remission (H), Bone marrow transplant candidate       LORazepam 1 MG tablet    ATIVAN    60 tablet    Take 1 tablet by mouth in the morning, and 0.5 tablets by mouth in the evening    Nausea       melatonin 3 MG tablet     30 tablet    Take 1 tablet (3 mg) by mouth At Bedtime    Insomnia, unspecified type       mycophenolate 500 MG tablet    GENERIC EQUIVALENT    66 tablet    Take 2 tablets (1,000 mg) by mouth 3 times daily for 11 days    S/P allogeneic bone marrow transplant (H), At 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       oxyCODONE 5 MG tablet    ROXICODONE    40 tablet    Take 1 tablet (5 mg) by mouth daily    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), Bone marrow transplant candidate, S/P allogeneic bone marrow transplant (H), At risk for infection       pantoprazole 40 MG EC tablet    PROTONIX    30 tablet    Take 1 tablet (40 mg) by mouth 2 times daily    S/P allogeneic bone marrow transplant (H), Nausea, On total parenteral nutrition       sulfamethoxazole-trimethoprim 800-160 MG tablet    BACTRIM DS/SEPTRA DS    20 tablet    Take 1 tablet by mouth Every Mon, Tues two times daily    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)       valACYclovir 1000 mg tablet    VALTREX    90 tablet    Take 1 tablet (1,000 mg) by mouth 3 times daily    S/P allogeneic bone marrow transplant (H), At risk for infection       voriconazole 200 MG tablet    VFEND    60 tablet    Take 2 tablets (400 mg) by mouth 2 times daily    S/P allogeneic bone marrow  transplant (H), 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), Bone marrow transplant candidate

## 2018-12-05 NOTE — TELEPHONE ENCOUNTER
Re. New rash on the trunk and both arms    Mom called ~6pm, 12/04/18 about new erythematous rash on the trunk and both shoulders-arms. No fever, pain, other organ involvement, mucous membrane lesions.     Mom reported that it could be due to bacterim they resumed on Monday. I advised to hold bactrim until seeing clinicians tomorrow.    Had a scheduled clinic follow up tomorrow.    Rosa Abdullahi MD  Fellow, Peds Hem/Onc

## 2018-12-05 NOTE — NURSING NOTE
"Chief Complaint   Patient presents with     RECHECK     Patient is here today for a follow up regarding 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)     /66 (BP Location: Left arm, Patient Position: Chair, Cuff Size: Adult Regular)  Pulse 98  Temp 98.4  F (36.9  C) (Oral)  Resp 16  Ht 1.754 m (5' 9.05\")  Wt 62.2 kg (137 lb 2 oz)  SpO2 99%  BMI 20.22 kg/m2    Isabel Gonsalez [facility]   December 5, 2018    "

## 2018-12-06 ENCOUNTER — OFFICE VISIT (OUTPATIENT)
Dept: DERMATOLOGY | Facility: CLINIC | Age: 18
End: 2018-12-06
Attending: DERMATOLOGY
Payer: COMMERCIAL

## 2018-12-06 DIAGNOSIS — L30.9 DERMATITIS: Primary | ICD-10-CM

## 2018-12-06 DIAGNOSIS — C91.01 ACUTE LYMPHOBLASTIC LEUKEMIA (ALL) IN REMISSION (H): ICD-10-CM

## 2018-12-06 PROCEDURE — 88305 TISSUE EXAM BY PATHOLOGIST: CPT | Mod: TC | Performed by: DERMATOLOGY

## 2018-12-06 PROCEDURE — 11100 HC BIOPSY SKIN/SUBQ/MUC MEM, SINGLE LESION: CPT | Mod: ZF | Performed by: DERMATOLOGY

## 2018-12-06 PROCEDURE — G0463 HOSPITAL OUTPT CLINIC VISIT: HCPCS | Mod: ZF

## 2018-12-06 RX ORDER — LIDOCAINE HYDROCHLORIDE AND EPINEPHRINE 10; 10 MG/ML; UG/ML
3 INJECTION, SOLUTION INFILTRATION; PERINEURAL ONCE
Qty: 3 ML | Refills: 0 | OUTPATIENT
Start: 2018-12-06 | End: 2019-02-15

## 2018-12-06 NOTE — TELEPHONE ENCOUNTER
Re: [place] level TDM    D:   - Current dose: 0.6mg BID  - Goal: 5-10    - Today's level: 10.3 (11.5 hrs post)    - Recent history  12/03: 8.6 (13 hrs post) -> unchanged  11/30: 8.9 (?11 vs. 23 hrs post) -> decrease from 0.8mg BID to 0.6mg BID    DI:   1. Voriconazole  - Recent history  12/3: 1.1 -> increased from 350mg BID to 400mg BID    A:  The level today is slightly above the target. It is also expected to increase due to the recent increase in voriconazole dose. We will decrease the dose today.    P:  Decrease from 0.6mg BID to 0.5mg BID  Recheck at the next appt in 2 days    The above plan was discussed with on call pharmacist and then given to the patient. The same information was emailed to the parents and the step mom.    Rosa Abdullahi MD  Fellow, Peds Hem/Onc

## 2018-12-06 NOTE — NURSING NOTE
Chief Complaint   Patient presents with     New Patient     rash. BMT patient     There were no vitals taken for this visit.  Tammy Lopez CMA

## 2018-12-06 NOTE — MR AVS SNAPSHOT
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 Rosamaria Gill, Dr. Laura Schaffer, Dr. Abdirizak Delacruz, Dr. Jazmín Bowden, Dr. Alvaro Leon       Pediatric Appointment Scheduling and Call Center [phone]     Non Urgent -Triage Voicemail Line; [phone]- Nica and Harriet RN's. Messages are checked periodically throughout the day and are returned as soon as possible.      Clinic Fax number: [phone]    If you need a prescription refill, please contact your pharmacy. They will send us an electronic request. Refills are approved or denied by our Physicians during normal business hours, Monday through Fridays    Per office policy, refills will not be granted if you have not been seen within the past year (or sooner depending on your child's condition)    *Radiology Scheduling- [phone]  *Sedation Unit Scheduling- [phone]  *[place] Scheduling- General [phone]; Pediatric Dermatology [phone]  *Main  Services: [phone]   Latvian: [phone]   Turkmen: [phone]   Hmong/Frisian/Jm: [phone]    For urgent matters that cannot wait until the next business day, is over a holiday and/or a weekend please call [phone] and ask for the Dermatology Resident On-Call to be paged.             Wound Care After a Biopsy    What is a skin biopsy?  A skin biopsy allows the doctor to examine a very small piece of tissue under the microscope to determine the diagnosis and the best treatment for the skin condition. A local anesthetic (numbing medicine)   is injected with a very small needle into the skin area to be tested. A small piece of skin is taken from the area. Sometimes a suture (stitch) is used.     What are the risks of a skin biopsy?  I will experience scar, bleeding, swelling, pain, crusting and redness. I may experience incomplete removal or recurrence. Risks of this procedure are excessive bleeding, bruising, infection, nerve damage, numbness, thick (hypertrophic or keloidal) scar and non-diagnostic biopsy.    How should I care for my wound for the first 24 hours?    Keep the wound dry and covered for 24 hours    If it bleeds, hold direct pressure on the area for 15 minutes. If bleeding does not stop then go to the emergency room    Avoid strenuous exercise the first 1-2 days or as your doctor instructs you    How should I care for the wound after 24 hours?    After 24 hours, remove the bandage    You may bathe or shower as normal    If you had a scalp biopsy, you can shampoo as usual and can use shower water to clean the biopsy site daily    Clean the wound twice a day with gentle soap and water    Do not scrub, be gentle    Apply white petroleum/Vaseline after cleaning the wound with a cotton swab or a clean finger, and keep the site covered with a Bandaid /bandage. Bandages are not necessary with a scalp biopsy    If you are unable to cover the site with a Bandaid /bandage, re-apply ointment 2-3 times a day to keep the site moist. Moisture will help with healing    Avoid strenuous activity for first 1-2 days    Avoid lakes, rivers, pools, and oceans until the stitches are removed or the site is healed    How do I clean my wound?    Wash hands thoroughly with soap or use hand  before all wound care    Clean the wound with gentle soap and water    Apply white petroleum/Vaseline  to wound after it is clean    Replace the Bandaid /bandage to keep the wound covered for the first few days or as instructed by your doctor    If you had a scalp  biopsy, warm shower water to the area on a daily basis should suffice    What should I use to clean my wound?     Cotton-tipped applicators (Qtips )    White petroleum jelly (Vaseline ). Use a clean new container and use Q-tips to apply.    Bandaids   as needed    Gentle soap     How should I care for my wound long term?    Do not get your wound dirty    Keep up with wound care for one week or until the area is healed.    A small scab will form and fall off by itself when the area is completely healed. The area will be red and will become pink in color as it heals. Sun protection is very important for how your scar will turn out. Sunscreen with an SPF 30 or greater is recommended once the area is healed.    If you have stitches, stitches need to be removed in 10-14 days. You may return to our clinic for this or you may have it done locally at your doctor s office.    You should have some soreness but it should be mild and slowly go away over several days. Talk to your doctor about using tylenol for pain,    When should I call my doctor?  If you have increased:     Pain or swelling    Pus or drainage (clear or slightly yellow drainage is ok)    Temperature over 100F    Spreading redness or warmth around wound    When will I hear about my results?  The biopsy results can take 2-3 weeks to come back. The clinic will call you with the results, send you a NewsiTt message, or have you schedule a follow-up clinic or phone time to discuss the results. Contact our clinics if you do not hear from us in 3 weeks.     Who should I call with questions?    [facility]: [phone]     [facility]: [phone]    For urgent needs outside of business hours call the [facility] at [phone] and ask for the dermatology resident on call                    Follow-ups after your visit        Follow-up notes from your care team     Return if symptoms worsen or fail  to improve.      Your next 10 appointments already scheduled     Dec 14, 2018 10:00 AM CST   (Arrive by 9:30 AM)   [facility] Bmt Peds Anniversary Visit with Viky Zavala MD   Peds Blood and Marrow Transplant ([facility])    [facility]  9th Floor  [address] [place] [address]   [phone]            Dec 19, 2018  8:00 AM CST   Peds BMT Eval with Carline Vaughan, PT   [facility] Physical Therapy - Outpatient ([facility])    [address] [place] [address] [address]  [facility] [address]   [phone]            Dec 21, 2018 11:00 AM CST   (Arrive by 10:30 AM)   [facility] Bmt Peds Anniversary Visit with Viky Zavala MD   Peds Blood and Marrow Transplant ([facility])    [facility]  9th Floor  [address] [place] [address]   [phone]            Dec 28, 2018 10:00 AM CST   (Arrive by 9:30 AM)   [facility] Bmt Peds Anniversary Visit with Viky Zavala MD   Peds Blood and Marrow Transplant ([facility])    [facility]  9th Floor  [address] [place] [address]   [phone]            Jan 03, 2019  2:30 PM CST   (Arrive by 2:00 PM)   [facility] Bmt Peds Anniversary Visit with Viky Zavala MD   Peds Blood and Marrow Transplant ([facility])    [facility]  9th Floor  [address] [place] [address]   [phone]            Jan 11, 2019 12:45 PM CST   (Arrive by 12:15 PM)   [facility] Bmt Peds Anniversary Visit with aCndido Han PA-C   Peds Blood and Marrow Transplant ([facility])    [facility]  9th Floor  [address] [place] [address]   [phone]            Jan 18, 2019 10:30 AM CST   (Arrive by 10:00 AM)   [facility] Bmt Peds Anniversary Visit with Viky Zavala MD   Peds Blood and Marrow Transplant ([facility])    [facility]  9th Floor  [address] [facility]  [address]   [phone]            Jan 25, 2019 10:30 AM CST   (Arrive by 10:00 AM)   [facility] Bmt Peds Anniversary Visit with Viky Zavala MD   Peds Blood and Marrow Transplant ([facility])    [facility]  9th Floor  [address] [place] [address]   [phone]            Feb 01, 2019 10:15 AM CST   (Arrive by 9:45 AM)   [facility] Bmt Peds Anniversary Visit with Candido Han PA-C   Peds Blood and Marrow Transplant ([facility])    [place] [address]  [address] [place] [address]   [phone]            Feb 08, 2019 10:45 AM CST   [facility] Bmt Peds Return with SNOW Silva CNP   Peds Blood and Marrow Transplant ([facility])    [address] [address]   [phone]              Future tests that were ordered for you today     Open Future Orders        Priority Expected Expires Ordered    CBC with platelets differential Routine 12/11/2018 12/7/2019 12/7/2018    Comprehensive metabolic panel Routine 12/11/2018 12/7/2019 12/7/2018    Magnesium Routine 12/11/2018 12/7/2019 12/7/2018    Phosphorus Routine 12/11/2018 12/7/2019 12/7/2018    Tacrolimus level Routine 12/11/2018 12/7/2019 12/7/2018    Lactate Dehydrogenase Routine 12/11/2018 12/7/2019 12/7/2018    INR Routine 12/11/2018 12/7/2019 12/7/2018    Protein  random urine with Creat Ratio Routine 12/11/2018 12/7/2019 12/7/2018    Voriconazole Level Routine 12/11/2018 12/7/2019 12/7/2018            Who to contact     Please call your clinic at [phone] to:    Ask questions about your health    Make or cancel appointments    Discuss your medicines    Learn about your test results    Speak to your doctor            Additional Information About Your Visit        MyChart Information     [employer] gives you secure access to your electronic health record. If you see a primary care provider, you can also send messages to  your care team and make appointments. If you have questions, please call your primary care clinic.  If you do not have a primary care provider, please call [phone] and they will assist you.      Make My plate is an electronic gateway that provides easy, online access to your medical records. With Make My plate, you can request a clinic appointment, read your test results, renew a prescription or communicate with your care team.     To access your existing account, please contact your [facility] or call [phone] for assistance.        Care EveryWhere ID     This is your Care EveryWhere ID. This could be used by other organizations to access your [place] medical records  [id number]         Blood Pressure from Last 3 Encounters:   12/07/18 102/72   12/05/18 104/66   12/03/18 105/63    Weight from Last 3 Encounters:   12/07/18 61.9 kg (136 lb 7.4 oz)   12/05/18 62.2 kg (137 lb 2 oz)   12/03/18 63 kg (138 lb 14.2 oz)              We Performed the Following     BIOPSY SKIN/SUBQ/MUC MEM, SINGLE LESION     Dermatological path order and indications          Today's Medication Changes          These changes are accurate as of 12/6/18 11:59 PM.  If you have any questions, ask your nurse or doctor.               Start taking these medicines.        Dose/Directions    lidocaine 1% with EPINEPHrine 1:100,000 1 %-1:076585 injection   Used for:  Dermatitis        Dose:  3 mL   Inject 3 mLs into the skin once for 1 dose   Quantity:  3 mL   Refills:  0            Where to get your medicines      Some of these will need a paper prescription and others can be bought over the counter.  Ask your nurse if you have questions.     You don't need a prescription for these medications     lidocaine 1% with EPINEPHrine 1:100,000 1 %-1:660779 injection                Primary Care Provider Office Phone # Fax #    Janee Olmos PA-C [phone]       [facility] PEDS BLOOD MARROW [place] [address]  [facility] [address]        Equal Access to Services     [facility] HospitalBELIA : Hadii aad ku hadjaredstephen Liali, wabearda luqadaha, qatoddta kaalmasebastián gomez, jessy madera. So [facility] [phone].    ATENCIÓN: Si habla español, tiene a london disposición servicios gratuitos de asistencia lingüística. Rita al [phone].    We comply with applicable federal civil rights laws and [place] laws. We do not discriminate on the basis of race, color, national origin, age, disability, sex, sexual orientation, or gender identity.            Thank you!     Thank you for choosing PEDS DERMATOLOGY  for your care. Our goal is always to provide you with excellent care. Hearing back from our patients is one way we can continue to improve our services. Please take a few minutes to complete the written survey that you may receive in the mail after your visit with us. Thank you!             Your Updated Medication List - Protect others around you: Learn how to safely use, store and throw away your medicines at www.disposemymeds.org.          This list is accurate as of 12/6/18 11:59 PM.  Always use your most recent med list.                   Brand Name Dispense Instructions for use Diagnosis    acetaminophen 325 MG tablet    TYLENOL    100 tablet    Take 2 tablets (650 mg) by mouth every 4 hours as needed for mild pain (discomfort with fever, fever of 102.5 or greater.)    Generalized pain       dronabinol 2.5 MG capsule    MARINOL    60 capsule    Take 1 capsule (2.5 mg) by mouth 2 times daily    Nausea, Anorexia       emollient external cream     453 g    Apply topically 4 times daily    Rash and nonspecific skin eruption       granisetron 1 MG tablet    KYTRIL    60 tablet    Take 1 tablet (1 mg) by mouth every 12 hours    Nausea       hydrocortisone 1 % external ointment    CORTIZONE-10    56 g    Apply sparingly to affected area, rash on neck three times a day.    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), Bone marrow transplant candidate       lidocaine 1% with EPINEPHrine 1:100,000 1 %-1:051968 injection     3 mL    Inject 3 mLs into the skin once for 1 dose    Dermatitis       LORazepam 1 MG tablet    ATIVAN    60 tablet    Take 1 tablet by mouth in the morning, and 0.5 tablets by mouth in the evening    Nausea       melatonin 3 MG tablet     30 tablet    Take 1 tablet (3 mg) by mouth At Bedtime    Insomnia, unspecified type       mycophenolate 500 MG tablet    GENERIC EQUIVALENT    66 tablet    Take 2 tablets (1,000 mg) by mouth 3 times daily for 11 days    S/P allogeneic bone marrow transplant (H), At 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       oxyCODONE 5 MG tablet    ROXICODONE    40 tablet    Take 1 tablet (5 mg) by mouth daily    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), Bone marrow transplant candidate, S/P allogeneic bone marrow transplant (H), At risk for infection       pantoprazole 40 MG EC tablet    PROTONIX    30 tablet    Take 1 tablet (40 mg) by mouth 2 times daily    S/P allogeneic bone marrow transplant (H), Nausea, On total parenteral nutrition       sulfamethoxazole-trimethoprim 800-160 MG tablet    BACTRIM DS/SEPTRA DS    20 tablet    Take 1 tablet by mouth Every Mon, Tues two times daily    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

## 2018-12-06 NOTE — PROGRESS NOTES
This is a recent snapshot of the patient's [facility] medical record.  For current drug dose and complete information and questions, call [phone] or In Basket pool, [facility] home infusion ([id number])  CSN Number:  [id number]

## 2018-12-06 NOTE — PROGRESS NOTES
Pediatric Dermatology New Patient Visit    Dermatology problem list  1. 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 Isael states about 1-1.5 weeks ago when he was in the hospital, he developed a new rash on the lateral aspect of his neck. This alter resolved and moved to the anterior neck, and then to the upper back, down the chest, and later the dorsal forearms and hands. He states this itches but does not hurt or burn. He is applying TAC 0.1% ointment four times a day to the arms/hands and BID to the neck. He feels like this helps with the itch, but it does not get the redness to go away. He denies any hx of sunburns in this distribution. He notes he sometimes takes his shirt off at home, but he does not tend to get a lot of sunlight on his skin. Otherwise, he denies any other general or skin-specific complaints at this time.   Past Medical/Surgical History: PAST MEDICAL HISTORY: is  negative  for surgeries or significant illness. There is no medical history of migraine, seizure, mood disorder, chemical dependency, cardiovascular, lung or gastrointestinal disease, back or joint problems or skin problems.  Family History: No family history on file.  Social History:   Social History   Substance Use Topics     Smoking status: Never Smoker     Smokeless tobacco: Never Used     Alcohol use Not on file     Medications:   Current Outpatient Prescriptions   Medication     dronabinol (MARINOL) 2.5 MG capsule     emollient (VANICREAM) cream     granisetron (KYTRIL) 1 MG tablet     hydrOXYzine (ATARAX) 10 MG/5ML syrup     LORazepam (ATIVAN) 1 MG tablet     melatonin 3 MG tablet     mycophenolate (GENERIC EQUIVALENT)  500 MG tablet     oxyCODONE (ROXICODONE) 5 MG tablet     pantoprazole (PROTONIX) 40 MG EC tablet     sulfamethoxazole-trimethoprim (BACTRIM DS/SEPTRA DS) 800-160 MG per tablet     tacrolimus (GENERIC EQUIVALENT) 1 mg/mL suspension     triamcinolone (KENALOG) 0.1 % external ointment     valACYclovir (VALTREX) 1000 mg tablet     voriconazole (VFEND) 200 MG tablet     acetaminophen (TYLENOL) 325 MG tablet     hydrocortisone 1 % ointment     No current facility-administered medications for this visit.      Allergies:      Allergies   Allergen Reactions     Blood Transfusion Related (Informational Only) Other (See Comments)     Patient has a history of a clinically significant antibody against RBC antigens.  A delay in compatible RBCs may occur.Stem cell transplant patient.  Give type O RBCs.     No Known Drug Allergies      ROS: a 10 point review of systems including constitutional, HEENT, CV, GI, musculoskeletal, Neurologic, Endocrine, Respiratory, Hematologic and Allergic/Immunologic was performed and was negative except for GI upset. And itchy skin  Physical examination:   There were no vitals taken for this visit.  General: Well-developed, well-nourished in no apparent distress  Eyes: lids, conjunctivae normal  Neck: supple  Respiratory: breathing comfortably  Cardiovascular: Well-perfused without edema or varicosities  Abdomen: non distended  Psychiatric: normal mood and affect  Extremities: No clubbing or cyanosis, nails normal  Skin: Skin examination of the scalp, face, neck, chest, abdomen, back, right and left arms, hands, fingers, ears, eyelids, and lips. This was notable for:  -Light pink eczematous papules and plaques on the posterior neck, upper back, medial chest, medial abdomen, dorsal forearms, and dorsal hands. These are primarily in plaques on the abdomen and chest and more papules on the arms. There are no blisters noted on exam today.   In office labs or procedures performed today: skin biopsy  (punch) of left abdomen  Assessment:  1. Acute dermatitis in a recent transplant pt who is also on voriconazole--DDx included acute GVHD, an eczematous dermatitis, or a photoxic or photorecall dermatitis 2/2 voriconazole  Plan:  1. At this time, Isael states his itch is well controlled with TAC 0.1%, so we will not changed this at this time. Given his recent transplant, a bx is required to R/O GVHD. GIven absence of other systemic symptoms and eczematous nature, we do not have a suspicion for an infectious process.  Punch biopsy:  After discussion of benefits and risks including but not limited to bleeding/bruising, pain/swelling, infection, scar, incomplete removal, nerve damage/numbness, recurrence, and non-diagnostic biopsy, written consent, verbal consent and photographs were obtained. Time-out was performed. The area was cleaned with isopropyl alcohol. 0.5mL of 1% lidocaine with epinephrine was injected to obtain adequate anesthesia of the lesion on the left abdomen. A 4 mm punch biopsy was performed.  4-0 prolene sutures were utilized to approximate the epidermal edges.  White petroleum jelly/VaselineTM and a bandage was applied to the wound.  Explicit verbal and written wound care instructions were provided.  The patient left the Dermatology Clinic in good condition. The patient was counseled to follow up with BMT for suture removal in approximately 10-14 days.    Follow-up with dermatology depending on results of bx, otherwise, F/U with BMT team  Thank you for this consultation.   Resident/attending: Kathleen Barakat    I have personally examined this patient and agree with Dr. Barakat's documentation and plan of care. I have reviewed and amended the resident's note above. The documentation accurately reflects my clinical observations, diagnoses, treatment and follow-up plans. I was present for key portions of the procedure.        Jazmín Wang MD  Dermatology Staff    Copy: Candido Han PA-C  [id number]  [place] [address]

## 2018-12-06 NOTE — PATIENT INSTRUCTIONS
[facility]- Pediatric Dermatology  Dr. Rosamaria Gill, Dr. Laura Schaffer, Dr. Abdirizak Delacruz, Dr. Jazmín Bowden, Dr. Alvaro Leon       Pediatric Appointment Scheduling and Call Center [phone]     Non Urgent -Triage Voicemail Line; [phone]- Nica and Harriet RN's. Messages are checked periodically throughout the day and are returned as soon as possible.      Clinic Fax number: [phone]    If you need a prescription refill, please contact your pharmacy. They will send us an electronic request. Refills are approved or denied by our Physicians during normal business hours, Monday through Fridays    Per office policy, refills will not be granted if you have not been seen within the past year (or sooner depending on your child's condition)    *Radiology Scheduling- [phone]  *Sedation Unit Scheduling- [phone]  *[place] Scheduling- General [phone]; Pediatric Dermatology [phone]  *Main  Services: [phone]   Swedish: [phone]   Uzbek: [phone]   Hmong/Lithuanian/Jm: [phone]    For urgent matters that cannot wait until the next business day, is over a holiday and/or a weekend please call [phone] and ask for the Dermatology Resident On-Call to be paged.             Wound Care After a Biopsy    What is a skin biopsy?  A skin biopsy allows the doctor to examine a very small piece of tissue under the microscope to determine the diagnosis and the best treatment for the skin condition. A local anesthetic (numbing medicine)  is injected with a very small needle into the skin area to be tested. A small piece of skin is taken from the area. Sometimes a suture (stitch) is used.     What are the risks of a skin biopsy?  I will experience scar, bleeding, swelling, pain, crusting and redness. I may experience incomplete removal or recurrence. Risks of this procedure are excessive bleeding, bruising, infection, nerve damage,  numbness, thick (hypertrophic or keloidal) scar and non-diagnostic biopsy.    How should I care for my wound for the first 24 hours?    Keep the wound dry and covered for 24 hours    If it bleeds, hold direct pressure on the area for 15 minutes. If bleeding does not stop then go to the emergency room    Avoid strenuous exercise the first 1-2 days or as your doctor instructs you    How should I care for the wound after 24 hours?    After 24 hours, remove the bandage    You may bathe or shower as normal    If you had a scalp biopsy, you can shampoo as usual and can use shower water to clean the biopsy site daily    Clean the wound twice a day with gentle soap and water    Do not scrub, be gentle    Apply white petroleum/Vaseline after cleaning the wound with a cotton swab or a clean finger, and keep the site covered with a Bandaid /bandage. Bandages are not necessary with a scalp biopsy    If you are unable to cover the site with a Bandaid /bandage, re-apply ointment 2-3 times a day to keep the site moist. Moisture will help with healing    Avoid strenuous activity for first 1-2 days    Avoid lakes, rivers, pools, and oceans until the stitches are removed or the site is healed    How do I clean my wound?    Wash hands thoroughly with soap or use hand  before all wound care    Clean the wound with gentle soap and water    Apply white petroleum/Vaseline  to wound after it is clean    Replace the Bandaid /bandage to keep the wound covered for the first few days or as instructed by your doctor    If you had a scalp biopsy, warm shower water to the area on a daily basis should suffice    What should I use to clean my wound?     Cotton-tipped applicators (Qtips )    White petroleum jelly (Vaseline ). Use a clean new container and use Q-tips to apply.    Bandaids   as needed    Gentle soap     How should I care for my wound long term?    Do not get your wound dirty    Keep up with wound care for one week or until  the area is healed.    A small scab will form and fall off by itself when the area is completely healed. The area will be red and will become pink in color as it heals. Sun protection is very important for how your scar will turn out. Sunscreen with an SPF 30 or greater is recommended once the area is healed.    If you have stitches, stitches need to be removed in 10-14 days. You may return to our clinic for this or you may have it done locally at your doctor s office.    You should have some soreness but it should be mild and slowly go away over several days. Talk to your doctor about using tylenol for pain,    When should I call my doctor?  If you have increased:     Pain or swelling    Pus or drainage (clear or slightly yellow drainage is ok)    Temperature over 100F    Spreading redness or warmth around wound    When will I hear about my results?  The biopsy results can take 2-3 weeks to come back. The clinic will call you with the results, send you a [employer] message, or have you schedule a follow-up clinic or phone time to discuss the results. Contact our clinics if you do not hear from us in 3 weeks.     Who should I call with questions?    [facility]: [phone]     [facility]: [phone]    For urgent needs outside of business hours call the [facility] at [phone] and ask for the dermatology resident on call

## 2018-12-06 NOTE — LETTER
12/6/2018      RE: Artemio Nino  [address] [address]       Pediatric Dermatology New Patient Visit    Dermatology problem list  1. 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 Isael states about 1-1.5 weeks ago when he was in the hospital, he developed a new rash on the lateral aspect of his neck. This alter resolved and moved to the anterior neck, and then to the upper back, down the chest, and later the dorsal forearms and hands. He states this itches but does not hurt or burn. He is applying TAC 0.1% ointment four times a day to the arms/hands and BID to the neck. He feels like this helps with the itch, but it does not get the redness to go away. He denies any hx of sunburns in this distribution. He notes he sometimes takes his shirt off at home, but he does not tend to get a lot of sunlight on his skin. Otherwise, he denies any other general or skin-specific complaints at this time.   Past Medical/Surgical History: PAST MEDICAL HISTORY: is  negative  for surgeries or significant illness. There is no medical history of migraine, seizure, mood disorder, chemical dependency, cardiovascular, lung or gastrointestinal disease, back or joint problems or skin problems.  Family History: No family history on file.  Social History:   Social History   Substance Use Topics     Smoking status: Never Smoker     Smokeless tobacco: Never Used     Alcohol use Not on file     Medications:   Current Outpatient Prescriptions   Medication     dronabinol (MARINOL) 2.5 MG capsule     emollient (VANICREAM) cream     granisetron (KYTRIL) 1 MG tablet     hydrOXYzine (ATARAX) 10 MG/5ML syrup      LORazepam (ATIVAN) 1 MG tablet     melatonin 3 MG tablet     mycophenolate (GENERIC EQUIVALENT) 500 MG tablet     oxyCODONE (ROXICODONE) 5 MG tablet     pantoprazole (PROTONIX) 40 MG EC tablet     sulfamethoxazole-trimethoprim (BACTRIM DS/SEPTRA DS) 800-160 MG per tablet     tacrolimus (GENERIC EQUIVALENT) 1 mg/mL suspension     triamcinolone (KENALOG) 0.1 % external ointment     valACYclovir (VALTREX) 1000 mg tablet     voriconazole (VFEND) 200 MG tablet     acetaminophen (TYLENOL) 325 MG tablet     hydrocortisone 1 % ointment     No current facility-administered medications for this visit.      Allergies:      Allergies   Allergen Reactions     Blood Transfusion Related (Informational Only) Other (See Comments)     Patient has a history of a clinically significant antibody against RBC antigens.  A delay in compatible RBCs may occur.Stem cell transplant patient.  Give type O RBCs.     No Known Drug Allergies      ROS: a 10 point review of systems including constitutional, HEENT, CV, GI, musculoskeletal, Neurologic, Endocrine, Respiratory, Hematologic and Allergic/Immunologic was performed and was negative except for GI upset. And itchy skin  Physical examination:   There were no vitals taken for this visit.  General: Well-developed, well-nourished in no apparent distress  Eyes: lids, conjunctivae normal  Neck: supple  Respiratory: breathing comfortably  Cardiovascular: Well-perfused without edema or varicosities  Abdomen: non distended  Psychiatric: normal mood and affect  Extremities: No clubbing or cyanosis, nails normal  Skin: Skin examination of the scalp, face, neck, chest, abdomen, back, right and left arms, hands, fingers, ears, eyelids, and lips. This was notable for:  -Light pink eczematous papules and plaques on the posterior neck, upper back, medial chest, medial abdomen, dorsal forearms, and dorsal hands. These are primarily in plaques on the abdomen and chest and more papules on the arms. There are no  blisters noted on exam today.   In office labs or procedures performed today: skin biopsy (punch) of left abdomen  Assessment:  1. Acute dermatitis in a recent transplant pt who is also on voriconazole--DDx included acute GVHD, an eczematous dermatitis, or a photoxic or photorecall dermatitis 2/2 voriconazole  Plan:  1. At this time, Isael states his itch is well controlled with TAC 0.1%, so we will not changed this at this time. Given his recent transplant, a bx is required to R/O GVHD. GIven absence of other systemic symptoms and eczematous nature, we do not have a suspicion for an infectious process.  Punch biopsy:  After discussion of benefits and risks including but not limited to bleeding/bruising, pain/swelling, infection, scar, incomplete removal, nerve damage/numbness, recurrence, and non-diagnostic biopsy, written consent, verbal consent and photographs were obtained. Time-out was performed. The area was cleaned with isopropyl alcohol. 0.5mL of 1% lidocaine with epinephrine was injected to obtain adequate anesthesia of the lesion on the left abdomen. A 4 mm punch biopsy was performed.  4-0 prolene sutures were utilized to approximate the epidermal edges.  White petroleum jelly/VaselineTM and a bandage was applied to the wound.  Explicit verbal and written wound care instructions were provided.  The patient left the Dermatology Clinic in good condition. The patient was counseled to follow up with BMT for suture removal in approximately 10-14 days.    Follow-up with dermatology depending on results of bx, otherwise, F/U with BMT team  Thank you for this consultation.   Resident/attending: Kathleen Barakat    I have personally examined this patient and agree with Dr. Barakat's documentation and plan of care. I have reviewed and amended the resident's note above. The documentation accurately reflects my clinical observations, diagnoses, treatment and follow-up plans. I was present for key portions of the  procedure.        Jazmín Wang MD  Dermatology Staff    Copy: Candido Han PA-C  [address] [place] [address]

## 2018-12-07 ENCOUNTER — ALLIED HEALTH/NURSE VISIT (OUTPATIENT)
Dept: TRANSPLANT | Facility: CLINIC | Age: 18
End: 2018-12-07
Attending: DIETITIAN, REGISTERED
Payer: COMMERCIAL

## 2018-12-07 ENCOUNTER — ONCOLOGY VISIT (OUTPATIENT)
Dept: TRANSPLANT | Facility: CLINIC | Age: 18
End: 2018-12-07
Attending: PEDIATRICS
Payer: COMMERCIAL

## 2018-12-07 ENCOUNTER — HOME INFUSION (PRE-WILLOW HOME INFUSION) (OUTPATIENT)
Dept: PHARMACY | Facility: CLINIC | Age: 18
End: 2018-12-07

## 2018-12-07 ENCOUNTER — INFUSION THERAPY VISIT (OUTPATIENT)
Dept: INFUSION THERAPY | Facility: CLINIC | Age: 18
End: 2018-12-07
Attending: PEDIATRICS
Payer: COMMERCIAL

## 2018-12-07 VITALS
HEIGHT: 69 IN | HEART RATE: 93 BPM | SYSTOLIC BLOOD PRESSURE: 102 MMHG | BODY MASS INDEX: 20.21 KG/M2 | DIASTOLIC BLOOD PRESSURE: 72 MMHG | OXYGEN SATURATION: 98 % | TEMPERATURE: 98.7 F | RESPIRATION RATE: 20 BRPM | WEIGHT: 136.47 LBS

## 2018-12-07 DIAGNOSIS — C91.01 ACUTE LYMPHOBLASTIC LEUKEMIA (ALL) IN REMISSION (H): Primary | ICD-10-CM

## 2018-12-07 DIAGNOSIS — Z91.89 AT RISK FOR INFECTION: ICD-10-CM

## 2018-12-07 DIAGNOSIS — Z76.82 BONE MARROW TRANSPLANT CANDIDATE: ICD-10-CM

## 2018-12-07 DIAGNOSIS — C91.00 ALL (ACUTE LYMPHOBLASTIC LEUKEMIA OF INFANT) (H): ICD-10-CM

## 2018-12-07 DIAGNOSIS — Z94.81 S/P ALLOGENEIC BONE MARROW TRANSPLANT (H): ICD-10-CM

## 2018-12-07 LAB
ABO + RH BLD: ABNORMAL
ABO + RH BLD: ABNORMAL
ALBUMIN SERPL-MCNC: 3.7 G/DL (ref 3.4–5)
ALP SERPL-CCNC: 95 U/L (ref 40–150)
ALT SERPL W P-5'-P-CCNC: 72 U/L (ref 0–70)
ANION GAP SERPL CALCULATED.3IONS-SCNC: 7 MMOL/L (ref 3–14)
ANISOCYTOSIS BLD QL SMEAR: ABNORMAL
AST SERPL W P-5'-P-CCNC: 70 U/L (ref 0–45)
BASOPHILS # BLD AUTO: 0 10E9/L (ref 0–0.2)
BASOPHILS NFR BLD AUTO: 0.9 %
BILIRUB SERPL-MCNC: 0.5 MG/DL (ref 0.2–1.3)
BLD GP AB SCN SERPL QL: ABNORMAL
BLOOD BANK CMNT PATIENT-IMP: ABNORMAL
BLOOD BANK CMNT PATIENT-IMP: ABNORMAL
BUN SERPL-MCNC: 18 MG/DL (ref 7–30)
CALCIUM SERPL-MCNC: 8.6 MG/DL (ref 8.5–10.1)
CHLORIDE SERPL-SCNC: 111 MMOL/L (ref 94–109)
CO2 SERPL-SCNC: 24 MMOL/L (ref 20–32)
COPATH REPORT: NORMAL
CREAT SERPL-MCNC: 0.67 MG/DL (ref 0.66–1.25)
DIFFERENTIAL METHOD BLD: ABNORMAL
EOSINOPHIL # BLD AUTO: 0 10E9/L (ref 0–0.7)
EOSINOPHIL NFR BLD AUTO: 1.2 %
ERYTHROCYTE [DISTWIDTH] IN BLOOD BY AUTOMATED COUNT: 18.1 % (ref 10–15)
GFR SERPL CREATININE-BSD FRML MDRD: >90 ML/MIN/1.7M2
GLUCOSE SERPL-MCNC: 101 MG/DL (ref 70–99)
HCT VFR BLD AUTO: 28.4 % (ref 40–53)
HGB BLD-MCNC: 9.6 G/DL (ref 13.3–17.7)
IMM GRANULOCYTES # BLD: 0 10E9/L (ref 0–0.4)
IMM GRANULOCYTES NFR BLD: 0.9 %
LYMPHOCYTES # BLD AUTO: 0.7 10E9/L (ref 0.8–5.3)
LYMPHOCYTES NFR BLD AUTO: 21.5 %
MACROCYTES BLD QL SMEAR: PRESENT
MAGNESIUM SERPL-MCNC: 2.1 MG/DL (ref 1.6–2.3)
MCH RBC QN AUTO: 30.3 PG (ref 26.5–33)
MCHC RBC AUTO-ENTMCNC: 33.8 G/DL (ref 31.5–36.5)
MCV RBC AUTO: 90 FL (ref 78–100)
MICROCYTES BLD QL SMEAR: PRESENT
MONOCYTES # BLD AUTO: 0.9 10E9/L (ref 0–1.3)
MONOCYTES NFR BLD AUTO: 26.5 %
NEUTROPHILS # BLD AUTO: 1.7 10E9/L (ref 1.6–8.3)
NEUTROPHILS NFR BLD AUTO: 49 %
NRBC # BLD AUTO: 0 10*3/UL
NRBC BLD AUTO-RTO: 0 /100
PHOSPHATE SERPL-MCNC: 3.6 MG/DL (ref 2.5–4.5)
PLATELET # BLD AUTO: 145 10E9/L (ref 150–450)
PLATELET # BLD EST: ABNORMAL 10*3/UL
POTASSIUM SERPL-SCNC: 4.2 MMOL/L (ref 3.4–5.3)
PROT SERPL-MCNC: 7 G/DL (ref 6.8–8.8)
RBC # BLD AUTO: 3.17 10E12/L (ref 4.4–5.9)
SODIUM SERPL-SCNC: 142 MMOL/L (ref 133–144)
SPECIMEN EXP DATE BLD: ABNORMAL
TACROLIMUS BLD-MCNC: 7.4 UG/L (ref 5–15)
TME LAST DOSE: NORMAL H
WBC # BLD AUTO: 3.4 10E9/L (ref 4–11)

## 2018-12-07 PROCEDURE — 97802 MEDICAL NUTRITION INDIV IN: CPT | Mod: 59,ZF | Performed by: DIETITIAN, REGISTERED

## 2018-12-07 PROCEDURE — 83735 ASSAY OF MAGNESIUM: CPT | Performed by: NURSE PRACTITIONER

## 2018-12-07 PROCEDURE — G0463 HOSPITAL OUTPT CLINIC VISIT: HCPCS | Mod: ZF

## 2018-12-07 PROCEDURE — 84100 ASSAY OF PHOSPHORUS: CPT | Performed by: NURSE PRACTITIONER

## 2018-12-07 PROCEDURE — 86850 RBC ANTIBODY SCREEN: CPT | Performed by: NURSE PRACTITIONER

## 2018-12-07 PROCEDURE — 85025 COMPLETE CBC W/AUTO DIFF WBC: CPT | Performed by: NURSE PRACTITIONER

## 2018-12-07 PROCEDURE — 36415 COLL VENOUS BLD VENIPUNCTURE: CPT | Performed by: NURSE PRACTITIONER

## 2018-12-07 PROCEDURE — 80053 COMPREHEN METABOLIC PANEL: CPT | Performed by: NURSE PRACTITIONER

## 2018-12-07 PROCEDURE — 80197 ASSAY OF TACROLIMUS: CPT | Performed by: PHYSICIAN ASSISTANT

## 2018-12-07 PROCEDURE — 86900 BLOOD TYPING SEROLOGIC ABO: CPT | Performed by: NURSE PRACTITIONER

## 2018-12-07 PROCEDURE — 86901 BLOOD TYPING SEROLOGIC RH(D): CPT | Performed by: NURSE PRACTITIONER

## 2018-12-07 ASSESSMENT — PAIN SCALES - GENERAL: PAINLEVEL: NO PAIN (0)

## 2018-12-07 NOTE — MR AVS SNAPSHOT
After Visit Summary   12/7/2018    Artemio Nino    MRN: [id number]           Patient Information     Date Of Birth          2000        Visit Information        Provider Department      12/7/2018 12:30 PM 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)           Follow-ups after your visit        Your next 10 appointments already scheduled     Dec 14, 2018 10:00 AM CST   (Arrive by 9:30 AM)   [facility] Bmt Peds Anniversary Visit with Viky Zavala MD   Peds Blood and Marrow Transplant ([facility])    [place] [address] [address]   [phone]            Dec 19, 2018  8:00 AM CST   Peds BMT Eval with Carline Vaughan, PT   [facility] Physical Therapy - Outpatient ([facility])    [address] [place] [address] [address] [address]   [phone]            Dec 21, 2018 11:00 AM CST   (Arrive by 10:30 AM)   [facility] Bmt Peds Anniversary Visit with Viky Zavala MD   Peds Blood and Marrow Transplant ([facility])    [place] [address] [address]   [phone]            Dec 28, 2018 10:00 AM CST   (Arrive by 9:30 AM)   [facility] Bmt Peds Anniversary Visit with Viky Zavala MD   Peds Blood and Marrow Transplant ([facility])    [place] [address] [address]   [phone]            Jan 03, 2019  2:30 PM CST   (Arrive by 2:00 PM)   [facility] Bmt Peds Anniversary Visit with Viky Zavala MD   Peds Blood and Marrow Transplant ([facility])    [place] [address]  [address] [place] [address]   [phone]            Jan 11, 2019 12:45 PM CST    (Arrive by 12:15 PM)   [facility] Bmt Peds Anniversary Visit with Cadnido Han PA-C   Peds Blood and Marrow Transplant ([facility])    [place] [address] [address]   [phone]            Jan 18, 2019 10:30 AM CST   (Arrive by 10:00 AM)   [facility] Bmt Peds Anniversary Visit with Viky Zavala MD   Peds Blood and Marrow Transplant ([facility])    [place] [address] [address]   [phone]            Jan 25, 2019 10:30 AM CST   (Arrive by 10:00 AM)   [facility] Bmt Peds Anniversary Visit with Viky Zavala MD   Peds Blood and Marrow Transplant ([facility])    [place] [address] [address]   [phone]            Feb 01, 2019 10:15 AM CST   (Arrive by 9:45 AM)   [facility] Bmt Peds Anniversary Visit with Candido Han PA-C   Peds Blood and Marrow Transplant ([facility])    [place] [address] [address]   [phone]            Feb 08, 2019 10:45 AM CST   [facility] Bmt Peds Return with SNOW Silva CNP   Peds Blood and Marrow Transplant ([facility])    [address] [address]   [phone]              Future tests that were ordered for you today     Open Standing Orders        Priority Remaining Interval Expires Ordered    ABO/Rh type and screen Routine 14/16 AM DRAW  12/6/2018    Red blood cell prepare order unit Routine 99/100 CONDITIONAL (SPECIFY) BLOOD  12/6/2018    Platelets prepare order unit Routine 99/100 CONDITIONAL (SPECIFY) BLOOD  12/6/2018          Open Future Orders        Priority Expected Expires Ordered    CBC with platelets differential Routine 12/11/2018 12/7/2019 12/7/2018    Comprehensive metabolic panel Routine 12/11/2018 12/7/2019 12/7/2018    Magnesium Routine 12/11/2018 12/7/2019  12/7/2018    Phosphorus Routine 12/11/2018 12/7/2019 12/7/2018    Tacrolimus level Routine 12/11/2018 12/7/2019 12/7/2018    Lactate Dehydrogenase Routine 12/11/2018 12/7/2019 12/7/2018    INR Routine 12/11/2018 12/7/2019 12/7/2018    Protein  random urine with Creat Ratio Routine 12/11/2018 12/7/2019 12/7/2018    Voriconazole Level Routine 12/11/2018 12/7/2019 12/7/2018            Who to contact     Please call your clinic at [phone] to:    Ask questions about your health    Make or cancel appointments    Discuss your medicines    Learn about your test results    Speak to your doctor            Additional Information About Your Visit        [employer] Information     [employer] gives you secure access to your electronic health record. If you see a primary care provider, you can also send messages to your care team and make appointments. If you have questions, please call your primary care clinic.  If you do not have a primary care provider, please call [phone] and they will assist you.      [employer] is an electronic gateway that provides easy, online access to your medical records. With [employer], you can request a clinic appointment, read your test results, renew a prescription or communicate with your care team.     To access your existing account, please contact your [facility] or call [phone] for assistance.        Care EveryWhere ID     This is your Care EveryWhere ID. This could be used by other organizations to access your [place] medical records  [id number]         Blood Pressure from Last 3 Encounters:   12/07/18 102/72   12/05/18 104/66   12/03/18 105/63    Weight from Last 3 Encounters:   12/07/18 61.9 kg (136 lb 7.4 oz)   12/05/18 62.2 kg (137 lb 2 oz)   12/03/18 63 kg (138 lb 14.2 oz)              We Performed the Following     ABO/Rh type and screen     Platelets prepare order unit     Tacrolimus level        Primary Care Provider Office Phone # Fax #    [facility]  Sydnee Olmos PA-C [phone]       [facility] PEDS BLOOD [place] [address] [facility] [address]        Equal Access to Services     CHARY MEJIAS : Hadii aad ku hadstephanie Carlos, waaxda luqadaha, qaybta kaalmada patricia, jessy millerzay valverdeviktoralcides madera. So [facility] [phone].    ATENCIÓN: Si habla español, tiene a london disposición servicios gratuitos de asistencia lingüística. Rita al [phone].    We comply with applicable federal civil rights laws and [place] laws. We do not discriminate on the basis of race, color, national origin, age, disability, sex, sexual orientation, or gender identity.            Thank you!     Thank you for choosing PEDS IV INFUSION  for your care. Our goal is always to provide you with excellent care. Hearing back from our patients is one way we can continue to improve our services. Please take a few minutes to complete the written survey that you may receive in the mail after your visit with us. Thank you!             Your Updated Medication List - Protect others around you: Learn how to safely use, store and throw away your medicines at www.disposemymeds.org.          This list is accurate as of 12/7/18  2:02 PM.  Always use your most recent med list.                   Brand Name Dispense Instructions for use Diagnosis    acetaminophen 325 MG tablet    TYLENOL    100 tablet    Take 2 tablets (650 mg) by mouth every 4 hours as needed for mild pain (discomfort with fever, fever of 102.5 or greater.)    Generalized pain       dronabinol 2.5 MG capsule    MARINOL    60 capsule    Take 1 capsule (2.5 mg) by mouth 2 times daily    Nausea, Anorexia       emollient external cream     453 g    Apply topically 4 times daily    Rash and nonspecific skin eruption       granisetron 1 MG tablet    KYTRIL    60 tablet    Take 1 tablet (1 mg) by mouth every 12 hours    Nausea       hydrocortisone 1 % external ointment    CORTIZONE-10    56 g    Apply sparingly to  affected area, rash on neck three times a day.    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), Bone marrow transplant candidate       LORazepam 1 MG tablet    ATIVAN    60 tablet    Take 1 tablet by mouth in the morning, and 0.5 tablets by mouth in the evening    Nausea       melatonin 3 MG tablet     30 tablet    Take 1 tablet (3 mg) by mouth At Bedtime    Insomnia, unspecified type       mycophenolate 500 MG tablet    GENERIC EQUIVALENT    66 tablet    Take 2 tablets (1,000 mg) by mouth 3 times daily for 11 days    S/P allogeneic bone marrow transplant (H), At 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       oxyCODONE 5 MG tablet    ROXICODONE    40 tablet    Take 1 tablet (5 mg) by mouth daily    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), Bone marrow transplant candidate, S/P allogeneic bone marrow transplant (H), At risk for infection       pantoprazole 40 MG EC tablet    PROTONIX    30 tablet    Take 1 tablet (40 mg) by mouth 2 times daily    S/P allogeneic bone marrow transplant (H), Nausea, On total parenteral nutrition       sulfamethoxazole-trimethoprim 800-160 MG tablet    BACTRIM DS/SEPTRA DS    20 tablet    Take 1 tablet by mouth Every Mon, Tues two times daily    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

## 2018-12-07 NOTE — PATIENT INSTRUCTIONS
Return to [facility] for labs and exam with an POLLO on Tuesday, December 11th. Please hold BOTH tacrolimus and voriconazole prior to visit for blood drug level, and take this medication after level obtained - please avoid scheduling appointment before 9:30 if possible  Reschedule 12/19 PT appointment to either 12/11 or 12/14 if possible to coincide with days patient has BMT clinic appointments  Infusion needs: none  Patient has central line to be drawn off of per lab.   Medication changes: wean anti-emeitics as discussed  Care plan changes: be seen by BMT twice weekly starting next week  Contact information  During business hours (7:30am-4:30pm):   To leave a non-urgent voicemail: call triage line [phone]    To call for time-sensitive needs or concerns : call clinic  [phone]  Evenings after 4:30pm, weekends, and holidays:   For any needs or concerns: call for BMT fellow at [phone] 911 in the case of an emergency  Thank you!   Patient is scheduled for follow up with Jacqueline Fitzgerald on 12/11/2018 at 10:30am as of 12/10/2018 at 10:07am PANTERA

## 2018-12-07 NOTE — LETTER
"  12/7/2018      RE: Artemio Nino  [address] [facility] [address]       December 7, 2018    Dear Dr. Paniagua,     We had the pleasure of seeing Artemio Nino in our Pediatric Blood and Marrow Transplant Clinic today. 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 He is here today with his mother, father, and father's significant other for follow up care and labs.     Isael has been doing fairly well since being outpatient. He has a rash on torso and arms that was biopsied by dermatology yesterday. He has been using TMC 0.1% ointment since 12/3 and states he is seeing improvement. He continues to have abdominal pain, mostly in the mornings. He continues on TPN/IL over 12 hrs. He is more interested in eating and is eating small amounts of solid foods such as chicken noodle soup and jello. He is not having diarrhea but sometimes has looser stools depending on what he's eating. He denies nausea and is open to weaning ativan today. He is taking his oral medications well. He is tolerating his oxycodone wean.     Review of Systems: Pertinent positives include those mentioned in interval events. A complete review of systems was performed and is otherwise negative.       Medications:  Please see MAR     Physical Exam:  /72 (BP Location: Left arm, Patient Position: Chair, Cuff Size: Adult Regular)  Pulse 93  Temp 98.7  F (37.1  C) (Oral)  Resp 20  Ht 1.746 m (5' 8.74\")  Wt 61.9 kg (136 lb 7.4 oz)  SpO2 98%  BMI 20.31 kg/m2  FEN: Sitting on exam table. Awake and alert and in no apparent distress. Mother present.   HEENT: Alopecia, normocephalic, atraumatic, sclera clear, MMM, oral lesions essentially resolved, nares patent without discharge    CARD: Heart regular rate and rhythm. No murmurs, rubs or gallops. Cap refill 2 seconds.  RESP: Lungs clear in upper lobes, diminished in bases, shallow respirations.  No " increased work of breathing, crackles or wheezes.   ABD: Non-distended, non-tender, no organomegaly  EXTREM: No edema noted  SKIN: Scattered erythematous confluent macular rash over bilateral dorsal forearms with patchy involvement of upper abdomen and chest.     Labs:    Results for orders placed or performed in visit on 12/07/18   CBC with platelets differential   Result Value Ref Range    WBC 3.4 (L) 4.0 - 11.0 10e9/L    RBC Count 3.17 (L) 4.4 - 5.9 10e12/L    Hemoglobin 9.6 (L) 13.3 - 17.7 g/dL    Hematocrit 28.4 (L) 40.0 - 53.0 %    MCV 90 78 - 100 fl    MCH 30.3 26.5 - 33.0 pg    MCHC 33.8 31.5 - 36.5 g/dL    RDW 18.1 (H) 10.0 - 15.0 %    Platelet Count 145 (L) 150 - 450 10e9/L    Diff Method Automated Method     % Neutrophils 49.0 %    % Lymphocytes 21.5 %    % Monocytes 26.5 %    % Eosinophils 1.2 %    % Basophils 0.9 %    % Immature Granulocytes 0.9 %    Nucleated RBCs 0 0 /100    Absolute Neutrophil 1.7 1.6 - 8.3 10e9/L    Absolute Lymphocytes 0.7 (L) 0.8 - 5.3 10e9/L    Absolute Monocytes 0.9 0.0 - 1.3 10e9/L    Absolute Eosinophils 0.0 0.0 - 0.7 10e9/L    Absolute Basophils 0.0 0.0 - 0.2 10e9/L    Abs Immature Granulocytes 0.0 0 - 0.4 10e9/L    Absolute Nucleated RBC 0.0     Anisocytosis Moderate     Microcytes Present     Macrocytes Present     Platelet Estimate Confirming automated cell count    Magnesium   Result Value Ref Range    Magnesium 2.1 1.6 - 2.3 mg/dL   Phosphorus   Result Value Ref Range    Phosphorus 3.6 2.5 - 4.5 mg/dL   Comprehensive metabolic panel   Result Value Ref Range    Sodium 142 133 - 144 mmol/L    Potassium 4.2 3.4 - 5.3 mmol/L    Chloride 111 (H) 94 - 109 mmol/L    Carbon Dioxide 24 20 - 32 mmol/L    Anion Gap 7 3 - 14 mmol/L    Glucose 101 (H) 70 - 99 mg/dL    Urea Nitrogen 18 7 - 30 mg/dL    Creatinine 0.67 0.66 - 1.25 mg/dL    GFR Estimate >90 >60 mL/min/1.7m2    GFR Estimate If Black >90 >60 mL/min/1.7m2    Calcium 8.6 8.5 - 10.1 mg/dL    Bilirubin Total 0.5 0.2 - 1.3  mg/dL    Albumin 3.7 3.4 - 5.0 g/dL    Protein Total 7.0 6.8 - 8.8 g/dL    Alkaline Phosphatase 95 40 - 150 U/L    ALT 72 (H) 0 - 70 U/L    AST 70 (H) 0 - 45 U/L     Pendin/6 skin biopsy     Assessment/Plan:  Artemio is a 18 year old male with 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 Post transplant complications include C. diff diarrhea (resolved), rash (improving with TCM, biopsy pending), and poor oral nutrition (on TPN/IL). Reassured family that abdominal pain can be common post transplant and to notify us if pain is worsening.     BMT:   High risk B cell ALL (CNS negative) + JAK2 activation/BMT: bone marrow biopsy prior to transplant (10/15) MRD 0.006%.   Conditioning per : TBI days -4 through -1 followed by matched sibling donor transplant on    Engrafted day +15, PB VNTR 100% donor, unseparated Day +28 () bone marrow biopsy, 20-30% donor with trilineage hematopoesis, flow & FISH negative, 100% donor. Chromosomes pending.      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  Continue tacrolimus.  Rash is not consistent with GVHD and pathology shows drug eruption.      FEN/Renal:  Risk for malnutrition: continue TPN/IL, cycled over 12 hours. Small amount of PO intake. Continue Kcal counts.  Continue marinol BID for appetite stimulation and appreciate dietician recs.  Electrolytes stable.  Currently no evidence of TMA- continue to monitor weekly.       Pulmonary:  Pneumomediastinum: finding upon work up Chest CT (10/22), asymptomatic. Per radiology, likely benign, perhaps transient. No issues.        Cardiovascular: Aaron-Parkinson-White Syndrome: diagnosed upon syncope in 2018. Asymptomatic, no interventions to date. Work up EKG c/w WPW, sinus bradycardia at 49 bpm. Work up ECHO normal with EF 68%.     Heme:  Pancytopenia secondary to chemotherapy:   - Transfuse for hemoglobin < 8, platelets < 10,000, no premedications  - GCSF now PRN for ANC <  1000      Infectious Disease: Valtrex and voriconazole- vori level was sub- therapeutic and dose was increased. Will repeat the level next week.  Bactrim on hold- due to possibility of rash will switch to pentamidine. He also has a history of C. Diff- vancomycin discontinued 11/25.  No diarrhea currently.                                                                                                                        GI:   Nausea management: Nausea stable overall-intermittent abdominal pain persists but only in the am. Scheduled medications: Kytril Q12H, decreased ativan dose to 0.5 mg BID.  Should change to 0.5 mg daily next week and then be off.      ENT: Epistaxis: occasional drips, nasal mucosa dry-continue vaseline as needed.         Neuro: Mucositis/pain: generally improving/resolved  Oxycodone weaned from bid to daily today.    Pruritis: resolved in anal area, now with rash on neck-benadryl and hydroxyzine PRN.  Also with insomnia- continue melatonin at night as needed.     Derm:   Rash: erythematous, confluent rash over neck (resolved) and bilateral dorsal forearms (worse) and now involving chest. Continues hydrocortisone cream QID to neck and triamcinolone ointment applicatiion QID to arms and chest- consistent with drug eruption.       Social  - actively involved in creating an effective plan of care post discharge, parents are not  and communication appears challenging. Completed multidisciplinary team meeting with family on 11/21 with social work and RN coordinator  -Mother has concern of historical non-compliance with medication and cares and this continues to be addressed/reinforced by team as able    Disposition: RTC  12/11 for labs and exam with POLLO.    Sincerely,     Viky Zavala MD, PhD    Pediatric Blood and Marrow Transplant  [facility]      Written by Zeynep Melara DO  Pediatric Blood and Marrow Transplant  Fellow  [facility]  Pager [phone]    I, Viky Zavala MD PhD, saw this patient with the fellow and agree with the fellow s findings and plan of care as documented in the fellow s note.        Viky Zavala MD

## 2018-12-07 NOTE — PROGRESS NOTES
"December 7, 2018    Dear Dr. Paniagua,     We had the pleasure of seeing Artemio Nino in our Pediatric Blood and Marrow Transplant Clinic today. 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 He is here today with his mother, father, and father's significant other for follow up care and labs.     Isael has been doing fairly well since being outpatient. He has a rash on torso and arms that was biopsied by dermatology yesterday. He has been using TMC 0.1% ointment since 12/3 and states he is seeing improvement. He continues to have abdominal pain, mostly in the mornings. He continues on TPN/IL over 12 hrs. He is more interested in eating and is eating small amounts of solid foods such as chicken noodle soup and jello. He is not having diarrhea but sometimes has looser stools depending on what he's eating. He denies nausea and is open to weaning ativan today. He is taking his oral medications well. He is tolerating his oxycodone wean.     Review of Systems: Pertinent positives include those mentioned in interval events. A complete review of systems was performed and is otherwise negative.       Medications:  Please see MAR     Physical Exam:  /72 (BP Location: Left arm, Patient Position: Chair, Cuff Size: Adult Regular)  Pulse 93  Temp 98.7  F (37.1  C) (Oral)  Resp 20  Ht 1.746 m (5' 8.74\")  Wt 61.9 kg (136 lb 7.4 oz)  SpO2 98%  BMI 20.31 kg/m2  FEN: Sitting on exam table. Awake and alert and in no apparent distress. Mother present.   HEENT: Alopecia, normocephalic, atraumatic, sclera clear, MMM, oral lesions essentially resolved, nares patent without discharge    CARD: Heart regular rate and rhythm. No murmurs, rubs or gallops. Cap refill 2 seconds.  RESP: Lungs clear in upper lobes, diminished in bases, shallow respirations.  No increased work of breathing, crackles or wheezes.   ABD: Non-distended, non-tender, no " organomegaly  EXTREM: No edema noted  SKIN: Scattered erythematous confluent macular rash over bilateral dorsal forearms with patchy involvement of upper abdomen and chest.     Labs:    Results for orders placed or performed in visit on 12/07/18   CBC with platelets differential   Result Value Ref Range    WBC 3.4 (L) 4.0 - 11.0 10e9/L    RBC Count 3.17 (L) 4.4 - 5.9 10e12/L    Hemoglobin 9.6 (L) 13.3 - 17.7 g/dL    Hematocrit 28.4 (L) 40.0 - 53.0 %    MCV 90 78 - 100 fl    MCH 30.3 26.5 - 33.0 pg    MCHC 33.8 31.5 - 36.5 g/dL    RDW 18.1 (H) 10.0 - 15.0 %    Platelet Count 145 (L) 150 - 450 10e9/L    Diff Method Automated Method     % Neutrophils 49.0 %    % Lymphocytes 21.5 %    % Monocytes 26.5 %    % Eosinophils 1.2 %    % Basophils 0.9 %    % Immature Granulocytes 0.9 %    Nucleated RBCs 0 0 /100    Absolute Neutrophil 1.7 1.6 - 8.3 10e9/L    Absolute Lymphocytes 0.7 (L) 0.8 - 5.3 10e9/L    Absolute Monocytes 0.9 0.0 - 1.3 10e9/L    Absolute Eosinophils 0.0 0.0 - 0.7 10e9/L    Absolute Basophils 0.0 0.0 - 0.2 10e9/L    Abs Immature Granulocytes 0.0 0 - 0.4 10e9/L    Absolute Nucleated RBC 0.0     Anisocytosis Moderate     Microcytes Present     Macrocytes Present     Platelet Estimate Confirming automated cell count    Magnesium   Result Value Ref Range    Magnesium 2.1 1.6 - 2.3 mg/dL   Phosphorus   Result Value Ref Range    Phosphorus 3.6 2.5 - 4.5 mg/dL   Comprehensive metabolic panel   Result Value Ref Range    Sodium 142 133 - 144 mmol/L    Potassium 4.2 3.4 - 5.3 mmol/L    Chloride 111 (H) 94 - 109 mmol/L    Carbon Dioxide 24 20 - 32 mmol/L    Anion Gap 7 3 - 14 mmol/L    Glucose 101 (H) 70 - 99 mg/dL    Urea Nitrogen 18 7 - 30 mg/dL    Creatinine 0.67 0.66 - 1.25 mg/dL    GFR Estimate >90 >60 mL/min/1.7m2    GFR Estimate If Black >90 >60 mL/min/1.7m2    Calcium 8.6 8.5 - 10.1 mg/dL    Bilirubin Total 0.5 0.2 - 1.3 mg/dL    Albumin 3.7 3.4 - 5.0 g/dL    Protein Total 7.0 6.8 - 8.8 g/dL    Alkaline  Phosphatase 95 40 - 150 U/L    ALT 72 (H) 0 - 70 U/L    AST 70 (H) 0 - 45 U/L     Pendin/6 skin biopsy     Assessment/Plan:  Artemio is a 18 year old male with 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 Post transplant complications include C. diff diarrhea (resolved), rash (improving with TCM, biopsy pending), and poor oral nutrition (on TPN/IL). Reassured family that abdominal pain can be common post transplant and to notify us if pain is worsening.     BMT:   High risk B cell ALL (CNS negative) + JAK2 activation/BMT: bone marrow biopsy prior to transplant (10/15) MRD 0.006%.   Conditioning per : TBI days -4 through - followed by matched sibling donor transplant on    Engrafted day +15, PB VNTR 100% donor, unseparated Day +28 () bone marrow biopsy, 20-30% donor with trilineage hematopoesis, flow & FISH negative, 100% donor. Chromosomes pending.      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  Continue tacrolimus.  Rash is not consistent with GVHD and pathology shows drug eruption.      FEN/Renal:  Risk for malnutrition: continue TPN/IL, cycled over 12 hours. Small amount of PO intake. Continue Kcal counts.  Continue marinol BID for appetite stimulation and appreciate dietician recs.  Electrolytes stable.  Currently no evidence of TMA- continue to monitor weekly.       Pulmonary:  Pneumomediastinum: finding upon work up Chest CT (10/22), asymptomatic. Per radiology, likely benign, perhaps transient. No issues.        Cardiovascular: Aaron-Parkinson-White Syndrome: diagnosed upon syncope in 2018. Asymptomatic, no interventions to date. Work up EKG c/w WPW, sinus bradycardia at 49 bpm. Work up ECHO normal with EF 68%.     Heme:  Pancytopenia secondary to chemotherapy:   - Transfuse for hemoglobin < 8, platelets < 10,000, no premedications  - GCSF now PRN for ANC < 1000      Infectious Disease: Valtrex and voriconazole- vori level was sub-  therapeutic and dose was increased. Will repeat the level next week.  Bactrim on hold- due to possibility of rash will switch to pentamidine. He also has a history of C. Diff- vancomycin discontinued 11/25.  No diarrhea currently.                                                                                                                        GI:   Nausea management: Nausea stable overall-intermittent abdominal pain persists but only in the am. Scheduled medications: Kytril Q12H, decreased ativan dose to 0.5 mg BID.  Should change to 0.5 mg daily next week and then be off.      ENT: Epistaxis: occasional drips, nasal mucosa dry-continue vaseline as needed.         Neuro: Mucositis/pain: generally improving/resolved  Oxycodone weaned from bid to daily today.    Pruritis: resolved in anal area, now with rash on neck-benadryl and hydroxyzine PRN.  Also with insomnia- continue melatonin at night as needed.     Derm:   Rash: erythematous, confluent rash over neck (resolved) and bilateral dorsal forearms (worse) and now involving chest. Continues hydrocortisone cream QID to neck and triamcinolone ointment applicatiion QID to arms and chest- consistent with drug eruption.       Social  - actively involved in creating an effective plan of care post discharge, parents are not  and communication appears challenging. Completed multidisciplinary team meeting with family on 11/21 with social work and RN coordinator  -Mother has concern of historical non-compliance with medication and cares and this continues to be addressed/reinforced by team as able    Disposition: RTC  12/11 for labs and exam with POLLO.    Sincerely,     Viky Zavala MD, PhD    Pediatric Blood and Marrow Transplant  [facility]      Written by Zeynep Melara DO  Pediatric Blood and Marrow Transplant Fellow  [facility]  Pager [phone]    I, Viky Zavala,  MD PhD, saw this patient with the fellow and agree with the fellow s findings and plan of care as documented in the fellow s note.

## 2018-12-07 NOTE — NURSING NOTE
"Chief Complaint   Patient presents with     RECHECK     Patient is here today for a follow up regarding 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)     /72 (BP Location: Left arm, Patient Position: Chair, Cuff Size: Adult Regular)  Pulse 93  Temp 98.7  F (37.1  C) (Oral)  Resp 20  Ht 1.746 m (5' 8.74\")  Wt 61.9 kg (136 lb 7.4 oz)  SpO2 98%  BMI 20.31 kg/m2    Isabel Gonsalez, [facility]   December 7, 2018    "

## 2018-12-07 NOTE — PHARMACY-CONSULT NOTE
Outpatient TPN Monitoring  Artemio's TPN formula, labs, and nutritional status were reviewed today. The following TPN changes are recommended: Change chloride to acetate ratio  Discussed with Dr. Viky Zavala and communicated to [facility].     Artemio will return to clinic on Tuesday 12/11 for labs and reassessment.      The following reflects the current TPN formula:  Dosing Weight: 65.1 kg  Volume: 1390 ml, cycled over 12 hours  Protein: 98 g/day   Dextrose: 255 g  Lipids: 240 mL      Sodium: 50 mEq/day  Potassium:   10 mEq/day  Calcium: 5 mEq/day  Magnesium:  45 mEq/day  Phosphorus:  0 mmol/day  Chloride:Acetate ratio:  1:2 (changed from 1:1)  Trace elements with Selenium: standard  Multivitamins: standard  Vitamin K:  2.5 mg/day      Pharmacy will continue to follow.  Erica May, PharmD

## 2018-12-07 NOTE — PROGRESS NOTES
Pt came to [facility] for labs/ possible transfusion and provider appointment. Labs drawn by [facility] Lab staff from central line. RN monitored labs and determined he did not meet criteria for transfusion. Pt seen by Dr. Zavala while in clinic. No other nursing cares needed.

## 2018-12-07 NOTE — PROGRESS NOTES
CLINICAL NUTRITION SERVICES - ASSESSMENT NOTE  Artemio is a 18 year old male seen by dietitian for Consult.  Pt seen with mom, dad and step mom- face time 15 minutes.      ANTHROPOMETRICS  Height/Length: 174 cm  Weight: 61.9 kg  BMI: 20.5 kg/m2, healthy weight for height  Dosing Weight: 65.1 kg (admission weight on 10/28/18)   Comments: weight was 62.1 kg at last RD visit 11/21      CURRENT NUTRITION ORDERS  Diet: Regular diet      CURRENT NUTRITION SUPPORT   Parenteral Nutrition:  Type of Parenteral Access: Central  PN frequency: Cycled over 12 hours      PN of Dextrose 255 g, GIR ~5 mg/kg/min, 98 g Amino Acids, 1.5 g/kg Amino Acids, 240 mL lipids for 1739 kcals, (27 kcal/kg). PN is meeting 100% of kcal needs and 100% of protein needs.      Intake/Tolerance: Per report Artemio is starting to eat some food.  He will eat Freezies, chicken noodle soup, jello, fruit snacks and drinking water.      Current factors affecting nutrition intake include:decreased appetite      NEW FINDINGS:  Continues on PN at home  Day +35      LABS  Labs reviewed      MEDICATIONS  Medications reviewed      ASSESSED NUTRITION NEEDS:  Estimated Energy Needs: 25-30 kcal/kg  Estimated Protein Needs: 1.5 g/kg  Estimated Fluid Needs: 1mL/kcal        PEDIATRIC NUTRITION STATUS VALIDATION  Patient does not meet criteria for malnutrition based on adult malnutrition criteria.      NUTRITION DIAGNOSIS:  Predicted suboptimal nutrient intake related to decreased appetite and po with reliance on PN to meet nutritional needs with potential for interruptions.      INTERVENTIONS  Nutrition Prescription  Nutrition support to meet needs until able to transition back to po      Implementation:  Meals and snacks- Discussed and encouraged po with Artemio.  He states that food doesn't taste right.  Discussed flavor preferences after chemo including salty, sour and spicy.  Also discussed more energy dense foods/snacks to try and encouraged po of protein  containing foods. We also talked about small frequent meals to help with no appetite and discussed that PN doesn't go through the GI tract and thus doesn't cause decreased appetite. Once po improves from bites to closer to 1/2 of items or small meals to start to keep a food log. Parenteral Nutrition- Continuing on current macronutrients in PN. Collaboration and Referral of Nutrition care- pt discussed with provider.    Goals  1. Wt maintenance within 5% of admit weight  2. Po and/or nutrition support to meet greater than 75% of nutritional needs    FOLLOW UP/MONITORING  Food and Beverage intake- monitor po, Enteral and parenteral nutrition intake- cut PN as able and Anthropometric measurements- monitor wt     Renetta Schwab, RD, LD, CNSC  BMT RD pgr: [phone]

## 2018-12-07 NOTE — MR AVS SNAPSHOT
MRN:[id number]                      After Visit Summary   12/7/2018    Artemio Nino    MRN: [id number]           Visit Information        Provider Department      12/7/2018 12:00 PM Renetta Schwab RD Peds Blood and Marrow Transplant        Your next 10 appointments already scheduled     Dec 14, 2018 10:00 AM CST   (Arrive by 9:30 AM)   [facility] Bmt Peds Anniversary Visit with Viky Zavala MD   Peds Blood and Marrow Transplant ([facility])    [facility]  [address] [address]   [phone]            Dec 19, 2018  8:00 AM CST   Peds BMT Eval with Carline Vaughan, PT   [facility] Physical Therapy - Outpatient ([facility])    [address] [place] [address] [address] [address]   [phone]            Dec 21, 2018 11:00 AM CST   (Arrive by 10:30 AM)   [facility] Bmt Peds Anniversary Visit with Viky Zavala MD   Peds Blood and Marrow Transplant ([facility])    [place] [address] [address]   [phone]            Dec 28, 2018 10:00 AM CST   (Arrive by 9:30 AM)   [facility] Bmt Peds Anniversary Visit with Viky Zavala MD   Peds Blood and Marrow Transplant ([facility])    [place] [address] [address]   [phone]            Jan 03, 2019  2:30 PM CST   (Arrive by 2:00 PM)   [facility] Bmt Peds Anniversary Visit with Viky Zavala MD   Peds Blood and Marrow Transplant ([facility])    [place] [address]  [address] [place] [address]   [phone]            Jan 11, 2019 12:45 PM CST   (Arrive by 12:15 PM)   [facility] Bmt Peds Anniversary Visit with Candido Han PA-C   Peds Blood and Marrow Transplant ([facility])    [place] [address]  [address] [place] [address]   [phone]             Jan 18, 2019 10:30 AM CST   (Arrive by 10:00 AM)   [facility] Bmt Peds Anniversary Visit with Viky Zavala MD   Peds Blood and Marrow Transplant ([facility])    [facility]  9th Floor  [address] [place] [address]   [phone]            Jan 25, 2019 10:30 AM CST   (Arrive by 10:00 AM)   [facility] Bmt Peds Anniversary Visit with Viky Zavala MD   Peds Blood and Marrow Transplant ([facility])    [facility]  9th Floor  [address] [place] [address]   [phone]            Feb 01, 2019 10:15 AM CST   (Arrive by 9:45 AM)   [facility] Bmt Peds Anniversary Visit with Candido Han PA-C   Peds Blood and Marrow Transplant ([facility])    [place] [address]  [address] [place] [address]   [phone]            Feb 08, 2019 10:45 AM CST   [facility] Bmt Peds Return with SNOW Silva CNP   Peds Blood and Marrow Transplant ([facility])    [place] [address] [address]   [phone]              [employer] Information     Nosto gives you secure access to your electronic health record. If you see a primary care provider, you can also send messages to your care team and make appointments. If you have questions, please call your primary care clinic.  If you do not have a primary care provider, please call [phone] and they will assist you.      Nosto is an electronic gateway that provides easy, online access to your medical records. With [employer], you can request a clinic appointment, read your test results, renew a prescription or communicate with your care team.     To access your existing account, please contact your [facility] or call [phone] for assistance.        Care EveryWhere ID     This is your Care EveryWhere ID. This could be used by other organizations to access your [facility]  records  [id number]        Equal Access to Services     CHARY MEJIAS : Hadkhurram Carlos, scott crawley, mega gomez, jessy madera. So [facility] [phone].    ATENCIÓN: Si habla español, tiene a london disposición servicios gratuitos de asistencia lingüística. Llame al [phone].    We comply with applicable federal civil rights laws and [place] laws. We do not discriminate on the basis of race, color, national origin, age, disability, sex, sexual orientation, or gender identity.

## 2018-12-10 ENCOUNTER — HOME INFUSION (PRE-WILLOW HOME INFUSION) (OUTPATIENT)
Dept: PHARMACY | Facility: CLINIC | Age: 18
End: 2018-12-10

## 2018-12-11 ENCOUNTER — ONCOLOGY VISIT (OUTPATIENT)
Dept: TRANSPLANT | Facility: CLINIC | Age: 18
End: 2018-12-11
Attending: NURSE PRACTITIONER
Payer: COMMERCIAL

## 2018-12-11 ENCOUNTER — HOME INFUSION (PRE-WILLOW HOME INFUSION) (OUTPATIENT)
Dept: PHARMACY | Facility: CLINIC | Age: 18
End: 2018-12-11

## 2018-12-11 VITALS
RESPIRATION RATE: 20 BRPM | HEART RATE: 90 BPM | BODY MASS INDEX: 20.6 KG/M2 | WEIGHT: 138.45 LBS | TEMPERATURE: 98 F | DIASTOLIC BLOOD PRESSURE: 70 MMHG | SYSTOLIC BLOOD PRESSURE: 108 MMHG | OXYGEN SATURATION: 100 %

## 2018-12-11 DIAGNOSIS — G47.00 INSOMNIA, UNSPECIFIED TYPE: ICD-10-CM

## 2018-12-11 DIAGNOSIS — Z94.81 S/P ALLOGENEIC BONE MARROW TRANSPLANT (H): ICD-10-CM

## 2018-12-11 DIAGNOSIS — C91.01 ACUTE LYMPHOBLASTIC LEUKEMIA (ALL) IN REMISSION (H): ICD-10-CM

## 2018-12-11 DIAGNOSIS — R11.0 NAUSEA: ICD-10-CM

## 2018-12-11 LAB
ALBUMIN SERPL-MCNC: 3.8 G/DL (ref 3.4–5)
ALP SERPL-CCNC: 99 U/L (ref 40–150)
ALT SERPL W P-5'-P-CCNC: 109 U/L (ref 0–70)
ANION GAP SERPL CALCULATED.3IONS-SCNC: 5 MMOL/L (ref 3–14)
ANISOCYTOSIS BLD QL SMEAR: ABNORMAL
AST SERPL W P-5'-P-CCNC: 97 U/L (ref 0–45)
BASOPHILS # BLD AUTO: 0 10E9/L (ref 0–0.2)
BASOPHILS NFR BLD AUTO: 0.9 %
BILIRUB SERPL-MCNC: 0.4 MG/DL (ref 0.2–1.3)
BUN SERPL-MCNC: 16 MG/DL (ref 7–30)
CALCIUM SERPL-MCNC: 8.6 MG/DL (ref 8.5–10.1)
CHLORIDE SERPL-SCNC: 109 MMOL/L (ref 94–109)
CO2 SERPL-SCNC: 27 MMOL/L (ref 20–32)
CREAT SERPL-MCNC: 0.77 MG/DL (ref 0.66–1.25)
CREAT UR-MCNC: 183 MG/DL
DACRYOCYTES BLD QL SMEAR: SLIGHT
DIFFERENTIAL METHOD BLD: ABNORMAL
EOSINOPHIL # BLD AUTO: 0.2 10E9/L (ref 0–0.7)
EOSINOPHIL NFR BLD AUTO: 4.6 %
ERYTHROCYTE [DISTWIDTH] IN BLOOD BY AUTOMATED COUNT: 21 % (ref 10–15)
GFR SERPL CREATININE-BSD FRML MDRD: >90 ML/MIN/1.7M2
GLUCOSE SERPL-MCNC: 98 MG/DL (ref 70–99)
HCT VFR BLD AUTO: 30.6 % (ref 40–53)
HGB BLD-MCNC: 10.2 G/DL (ref 13.3–17.7)
INR PPP: 1.07 (ref 0.86–1.14)
LDH SERPL L TO P-CCNC: 337 U/L (ref 85–227)
LYMPHOCYTES # BLD AUTO: 0.5 10E9/L (ref 0.8–5.3)
LYMPHOCYTES NFR BLD AUTO: 10 %
MACROCYTES BLD QL SMEAR: PRESENT
MAGNESIUM SERPL-MCNC: 2.1 MG/DL (ref 1.6–2.3)
MCH RBC QN AUTO: 31.4 PG (ref 26.5–33)
MCHC RBC AUTO-ENTMCNC: 33.3 G/DL (ref 31.5–36.5)
MCV RBC AUTO: 94 FL (ref 78–100)
METAMYELOCYTES # BLD: 0.1 10E9/L
METAMYELOCYTES NFR BLD MANUAL: 1.8 %
MONOCYTES # BLD AUTO: 1 10E9/L (ref 0–1.3)
MONOCYTES NFR BLD AUTO: 19.1 %
MYELOCYTES # BLD: 0.1 10E9/L
MYELOCYTES NFR BLD MANUAL: 2.7 %
NEUTROPHILS # BLD AUTO: 3 10E9/L (ref 1.6–8.3)
NEUTROPHILS NFR BLD AUTO: 60.9 %
OVALOCYTES BLD QL SMEAR: SLIGHT
PHOSPHATE SERPL-MCNC: 3.3 MG/DL (ref 2.5–4.5)
PLATELET # BLD AUTO: 204 10E9/L (ref 150–450)
PLATELET # BLD EST: ABNORMAL 10*3/UL
POIKILOCYTOSIS BLD QL SMEAR: SLIGHT
POLYCHROMASIA BLD QL SMEAR: SLIGHT
POTASSIUM SERPL-SCNC: 4.1 MMOL/L (ref 3.4–5.3)
PROT SERPL-MCNC: 6.9 G/DL (ref 6.8–8.8)
PROT UR-MCNC: 0.31 G/L
PROT/CREAT 24H UR: 0.17 G/G CR (ref 0–0.2)
RBC # BLD AUTO: 3.25 10E12/L (ref 4.4–5.9)
SODIUM SERPL-SCNC: 141 MMOL/L (ref 133–144)
TACROLIMUS BLD-MCNC: 6 UG/L (ref 5–15)
TME LAST DOSE: NORMAL H
WBC # BLD AUTO: 5 10E9/L (ref 4–11)

## 2018-12-11 PROCEDURE — 80299 QUANTITATIVE ASSAY DRUG: CPT | Performed by: PEDIATRICS

## 2018-12-11 PROCEDURE — 83615 LACTATE (LD) (LDH) ENZYME: CPT | Performed by: PEDIATRICS

## 2018-12-11 PROCEDURE — 85610 PROTHROMBIN TIME: CPT | Performed by: PEDIATRICS

## 2018-12-11 PROCEDURE — 36592 COLLECT BLOOD FROM PICC: CPT | Performed by: PEDIATRICS

## 2018-12-11 PROCEDURE — 83735 ASSAY OF MAGNESIUM: CPT | Performed by: PEDIATRICS

## 2018-12-11 PROCEDURE — 80053 COMPREHEN METABOLIC PANEL: CPT | Performed by: PEDIATRICS

## 2018-12-11 PROCEDURE — 80197 ASSAY OF TACROLIMUS: CPT | Performed by: PEDIATRICS

## 2018-12-11 PROCEDURE — 84100 ASSAY OF PHOSPHORUS: CPT | Performed by: PEDIATRICS

## 2018-12-11 PROCEDURE — G0463 HOSPITAL OUTPT CLINIC VISIT: HCPCS | Mod: ZF

## 2018-12-11 PROCEDURE — 84156 ASSAY OF PROTEIN URINE: CPT | Performed by: PEDIATRICS

## 2018-12-11 PROCEDURE — 85025 COMPLETE CBC W/AUTO DIFF WBC: CPT | Performed by: PEDIATRICS

## 2018-12-11 RX ORDER — LANOLIN ALCOHOL/MO/W.PET/CERES
6 CREAM (GRAM) TOPICAL
Qty: 30 TABLET | Refills: 3 | COMMUNITY
Start: 2018-12-11 | End: 2018-12-28

## 2018-12-11 RX ORDER — FLUCONAZOLE 200 MG/1
200 TABLET ORAL DAILY
Qty: 30 TABLET | Refills: 3 | Status: SHIPPED | OUTPATIENT
Start: 2018-12-11 | End: 2018-12-21

## 2018-12-11 RX ORDER — LORAZEPAM 1 MG/1
0.5 TABLET ORAL 2 TIMES DAILY
Qty: 60 TABLET | Refills: 0 | COMMUNITY
Start: 2018-12-11 | End: 2018-12-14

## 2018-12-11 ASSESSMENT — PAIN SCALES - GENERAL: PAINLEVEL: NO PAIN (0)

## 2018-12-11 NOTE — PROGRESS NOTES
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 He is here today with his mother and father for labs and exam. Artemio is feeling well today without acute complaints. He is afebrile without overt indications of infection, malaise, nor fatigue. He is tolerating medications and more food by mouth without nausea or diarrhea. He was seen by dermatology last week for a rash of unknown cause. Biopsy obtained from that day (12/6) most consistent with phototoxic drug eruption potentially secondary to Voriconazole. The rash has improved with topical steroid ointment, however now an eruption has occurred on his mid-back which is new as of this morning. No active bleeding. Of note, Artemio is waking up every morning around 4 am, non-bothered but unable to return back to sleep. He goes to bed around 11pm and does not nap throughout the day. He is taking melatonin (3 mg) with seemingly little effect. He reports his abdominal pain has been much better and he has not needed PRN oxycodone recently.     Review of Systems: Pertinent positives include those mentioned in interval events. A complete review of systems was performed and is otherwise negative.       Medications:  Please see MAR     Physical Exam:  /70 (BP Location: Left arm, Patient Position: Fowlers, Cuff Size: Adult Regular)   Pulse 90   Temp 98  F (36.7  C) (Oral)   Resp 20   Wt 62.8 kg (138 lb 7.2 oz)   SpO2 100%   BMI 20.60 kg/m       FEN: Sitting on exam table. Awake and alert and in no apparent distress. Mother present.   HEENT: Alopecia, normocephalic, atraumatic, sclera clear, MMM, oral lesions essentially resolved, nares patent without discharge    CARD: Heart regular rate and rhythm. No murmurs, rubs or gallops. Cap refill 2 seconds.  RESP: Lungs clear in upper lobes, diminished in bases,  shallow respirations.  No increased work of breathing, crackles or wheezes.   ABD: Non-distended, non-tender, no organomegaly  EXTREM: No edema noted  SKIN: Scattered, faded confluent macular rash over bilateral dorsal forearms with patchy involvement of upper abdomen and chest (light pink today with interval improvement). New, scattered patchy involvement localized to mid-back about 4 x 5 inches.   ACCESS: CVC hep-locked, dressing CDI    Labs:    Results for orders placed or performed in visit on 12/11/18   Protein  random urine with Creat Ratio   Result Value Ref Range    Protein Random Urine 0.31 g/L    Protein Total Urine g/gr Creatinine 0.17 0 - 0.2 g/g Cr   INR   Result Value Ref Range    INR 1.07 0.86 - 1.14   Lactate Dehydrogenase   Result Value Ref Range    Lactate Dehydrogenase 337 (H) 85 - 227 U/L   Phosphorus   Result Value Ref Range    Phosphorus 3.3 2.5 - 4.5 mg/dL   Magnesium   Result Value Ref Range    Magnesium 2.1 1.6 - 2.3 mg/dL   Comprehensive metabolic panel   Result Value Ref Range    Sodium 141 133 - 144 mmol/L    Potassium 4.1 3.4 - 5.3 mmol/L    Chloride 109 94 - 109 mmol/L    Carbon Dioxide 27 20 - 32 mmol/L    Anion Gap 5 3 - 14 mmol/L    Glucose 98 70 - 99 mg/dL    Urea Nitrogen 16 7 - 30 mg/dL    Creatinine 0.77 0.66 - 1.25 mg/dL    GFR Estimate >90 >60 mL/min/1.7m2    GFR Estimate If Black >90 >60 mL/min/1.7m2    Calcium 8.6 8.5 - 10.1 mg/dL    Bilirubin Total 0.4 0.2 - 1.3 mg/dL    Albumin 3.8 3.4 - 5.0 g/dL    Protein Total 6.9 6.8 - 8.8 g/dL    Alkaline Phosphatase 99 40 - 150 U/L     (H) 0 - 70 U/L    AST 97 (H) 0 - 45 U/L   CBC with platelets differential   Result Value Ref Range    WBC 5.0 4.0 - 11.0 10e9/L    RBC Count 3.25 (L) 4.4 - 5.9 10e12/L    Hemoglobin 10.2 (L) 13.3 - 17.7 g/dL    Hematocrit 30.6 (L) 40.0 - 53.0 %    MCV 94 78 - 100 fl    MCH 31.4 26.5 - 33.0 pg    MCHC 33.3 31.5 - 36.5 g/dL    RDW 21.0 (H) 10.0 - 15.0 %    Platelet Count 204 150 - 450 10e9/L     Diff Method Manual Differential     % Neutrophils 60.9 %    % Lymphocytes 10.0 %    % Monocytes 19.1 %    % Eosinophils 4.6 %    % Basophils 0.9 %    % Metamyelocytes 1.8 %    % Myelocytes 2.7 %    Absolute Neutrophil 3.0 1.6 - 8.3 10e9/L    Absolute Lymphocytes 0.5 (L) 0.8 - 5.3 10e9/L    Absolute Monocytes 1.0 0.0 - 1.3 10e9/L    Absolute Eosinophils 0.2 0.0 - 0.7 10e9/L    Absolute Basophils 0.0 0.0 - 0.2 10e9/L    Absolute Metamyelocytes 0.1 (H) 0 10e9/L    Absolute Myelocytes 0.1 (H) 0 10e9/L    Anisocytosis Moderate     Poikilocytosis Slight     Polychromasia Slight     Teardrop Cells Slight     Ovalocytes Slight     Macrocytes Present     Platelet Estimate Confirming automated cell count    Creatinine urine calculation only   Result Value Ref Range    Creatinine Urine 183 mg/dL     Skin biopsy (12/6):   Copath Report 12/06/2018  1:16 PM 88   Patient Name: GLEN HAMMER   MR#: [id number]   Specimen #: [id number]   Collected: 12/6/2018   Received: 12/6/2018   Reported: 12/7/2018 12:41   Ordering Phy(s): JULIA SILVA     For improved result formatting, select 'View Enhanced Report Format' under    Linked Documents section.     SPECIMEN(S):   Punch biopsy, left abdomen     FINAL DIAGNOSIS:   Punch biopsy, left abdomen:   - Epidermal dyskeratosis without significant basal vacuolar change - (see   comment)     COMMENT:   These features are compatible with the clinical impression of a phototoxic    drug eruption.  The presence of   subtle syringo-squamous metaplasia with occasional necrotic keratinocytes   in eccrine ducts also raises the   possibility of a toxic erythema of chemotherapy.  Gvwyq-agxbrt-eomj   disease cannot be entirely excluded,   though the absence of significant basal layer vacuolar change appears to   make this possibility less likely.   Clinical correlation and close followup are recommended.     I have personally reviewed all specimens and/or slides, including the   listed special  stains, and used them   with my medical judgement to determine or confirm the final diagnosis.     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 Post transplant complications include C. diff diarrhea (resolved), rash consistent with phototoxic drug eruption vs ISHMAEL for which he continues on TMC ointment, and poor oral nutrition for which he continues on TPN/IL.     BMT: High risk B cell ALL (CNS negative) + JAK2 activation/BMT: bone marrow biopsy prior to transplant (10/15) MRD 0.006%.   Conditioning per 2015-29: TBI days -4 through -1 followed by matched sibling donor transplant on 11/2. Engrafted day +15, PB VNTR 100% donor, unseparated Day +28 (11/27) bone marrow biopsy, 20-30% donor with trilineage hematopoesis, flow & FISH negative, 100% donor. Chromosomes pending.      GVHD: Post-transplant Cytoxan on days +3 and +4, MMF given through day +35. Rash is not consistent with GVHD. Continue Tacrolimus with goal of 5-10-- level pending from today.      FEN/Renal:  PO intake improving however still not eating full meals. Continues on TPN/IL 12 hours overnight along with Kcal counts and marinol BID for appetite stimulation. Dietician following. Electrolytes stable. LDH increasing (337 today), Urine Pr:Creat ratio WNL 0.17 today without other evidence of TMA. Continue weekly surveillance. Creatinine increased a bit today- encourage PO fluids and continue to monitor.      Pulmonary:  Pneumomediastinum: finding upon work up Chest CT (10/22), asymptomatic. Per radiology, likely benign, perhaps transient. No issues.    Cardiovascular: Aaron-Parkinson-White Syndrome: diagnosed upon syncope in 02/2018. Asymptomatic, no interventions to date. Work up EKG c/w WPW, sinus bradycardia at 49 bpm. Work up ECHO normal with EF 68%.     Heme: Artemio experienced pancytopenia secondary to  chemotherapy for which he is transfused for Hgb <8 and Platelets <10k (no premedications required). GCSF continues PRN for ANC <1000. He is now transfusion independent.      Infectious Disease: Continue Valtrex for viral ppx - weekly CMV negative 12/7. Transition from Voriconazole to Fluconazole due to likely culprit of phototoxic drug eruption-- vori level will be cancelled. Continue Bactrim (less likely contributor to rash). He also has a history of C. Diff- vancomycin discontinued 11/25. No diarrhea currently.                                                                                                                    GI:  Artemio's nausea has resolved. He is currently utilizing kytril BID and Ativan TID (also to help with sleep). We will make Kytril PRN today and decrease ativan to BID (0.5 mg upon waking up for the day and around 10pm at night). Will continue to wean anti-emetics as able. He has a mild transaminitis - continue to monitor now that he will be off Voriconazole.     Neuro: Artemio's mucositis and abdominal pain has resolved. Oxycodone is available PRN but has not been utilized recently. He has melatonin available but does not feel as though it is helping him stay asleep- will increase his dose to 6 mg and make as needed.     Derm: Rash for which Artemio is using hydrocortisone cream QID to neck and triamcinolone ointment applicatiion QID to arms and chest. Dermatology visit and skin biopsy 12/6 revealed findings consistent with phototoxic drug eruption (vs ISHMAEL), likely Voriconazole with lower suspicion for Bactrim. Will transition from Voriconazole to Fluconazole as above and continue to monitor- continue topical treatment and atarax PRN.     Social:  actively involved in creating an effective plan of care post discharge, parents are not  and communication appears challenging. Completed multidisciplinary team meeting with family on 11/21 with social work and RN  coordinator. Mother has concern of historical non-compliance with medication and cares and this continues to be addressed/reinforced by team as able    Disposition: RTC 12/14 for labs and exam with Dr. Zavala.    BENJA Ames-LORENZA  [facility] Blood and Marrow Transplant  [facility] [address]  [phone]  [phone]    Patient Active Problem List   Diagnosis     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)     Aaron-Parkinson-White (WPW) syndrome     Bone marrow transplant candidate     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)     At risk for infection     S/P allogeneic bone marrow transplant (H)

## 2018-12-11 NOTE — PHARMACY-CONSULT NOTE
Outpatient TPN Monitoring  Artemio's TPN formula, labs, and nutritional status were reviewed today. The following TPN changes are recommended: no changes  Discussed with Jacqueline Fitzgerald NP and communicated to [facility].     Artemio will return to clinic on Friday 12/14 for labs and reassessment.      The following reflects the current TPN formula:  Dosing Weight: 65.1 kg  Volume: 1390 ml, cycled over 12 hours  Protein: 98 g/day   Dextrose: 255 g  Lipids: 240 mL      Sodium: 50 mEq/day  Potassium:   10 mEq/day  Calcium: 5 mEq/day  Magnesium:  45 mEq/day  Phosphorus:  0 mmol/day  Chloride:Acetate ratio:  1:2   Trace elements with Selenium: standard  Multivitamins: standard  Vitamin K:  2.5 mg/day      Pharmacy will continue to follow.  Erica May, PharmD

## 2018-12-11 NOTE — PROGRESS NOTES
This is a recent snapshot of the patient's [facility] medical record.  For current drug dose and complete information and questions, call [phone] or In Basket pool, fv home infusion ([id number])  CSN Number:  [id number]

## 2018-12-11 NOTE — NURSING NOTE
Chief Complaint   Patient presents with     RECHECK     Patient here today for follow up with 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)     /70 (BP Location: Left arm, Patient Position: Fowlers, Cuff Size: Adult Regular)   Pulse 90   Temp 98  F (36.7  C) (Oral)   Resp 20   Wt 62.8 kg (138 lb 7.2 oz)   SpO2 100%   BMI 20.60 kg/m    Erinn Underwood CMA  December 11, 2018

## 2018-12-11 NOTE — PROGRESS NOTES
This is a recent snapshot of the patient's [facility] medical record.  For current drug dose and complete information and questions, call [phone] or In [facility] pool, fv home infusion ([id number])  CSN Number:  [id number]

## 2018-12-11 NOTE — PATIENT INSTRUCTIONS
Return to [facility] for labs and exam with Dr. Zavala on Friday at 8:30 for labs/9:00 EXAM. Please hold Tacrolimus prior to visit for blood drug level, and take this medication after level obtained.    Infusion needs: none    Patient has PICC, Central line, CVC line, to be drawn off of per lab.     Medication changes:   - Take Ativan in the AM when you wake up, and every evening at 10p  - Make Kytril as needed     Care plan changes: none    Contact information  During business hours (7:30am-4:30pm):   To leave a non-urgent voicemail: call triage line [phone]    To call for time-sensitive needs or concerns : call clinic  [phone]    Evenings after 4:30pm, weekends, and holidays:   For any needs or concerns: call for BMT fellow at [phone] in the case of an emergency    Thank you!     Already scheduled on 12/14/2018 at 10:00am with Dr Zavala, labs at 9:30am as of 12/12/2018 at 11:00am JE

## 2018-12-12 NOTE — PROGRESS NOTES
This is a recent snapshot of the patient's [facility] medical record.  For current drug dose and complete information and questions, call [phone] or In Basket pool, fv home infusion ([id number])  CSN Number:  [id number]

## 2018-12-13 ENCOUNTER — HOME INFUSION (PRE-WILLOW HOME INFUSION) (OUTPATIENT)
Dept: PHARMACY | Facility: CLINIC | Age: 18
End: 2018-12-13

## 2018-12-13 LAB — VORICONAZOLE SERPL-MCNC: 2 UG/ML (ref 1–5.5)

## 2018-12-14 ENCOUNTER — HOME INFUSION (PRE-WILLOW HOME INFUSION) (OUTPATIENT)
Dept: PHARMACY | Facility: CLINIC | Age: 18
End: 2018-12-14

## 2018-12-14 ENCOUNTER — ONCOLOGY VISIT (OUTPATIENT)
Dept: TRANSPLANT | Facility: CLINIC | Age: 18
End: 2018-12-14
Attending: PEDIATRICS
Payer: COMMERCIAL

## 2018-12-14 VITALS
HEART RATE: 90 BPM | BODY MASS INDEX: 20.57 KG/M2 | SYSTOLIC BLOOD PRESSURE: 114 MMHG | WEIGHT: 138.89 LBS | RESPIRATION RATE: 20 BRPM | OXYGEN SATURATION: 98 % | DIASTOLIC BLOOD PRESSURE: 77 MMHG | TEMPERATURE: 98.3 F | HEIGHT: 69 IN

## 2018-12-14 DIAGNOSIS — Z91.89 AT RISK FOR GRAFT VERSUS HOST DISEASE: ICD-10-CM

## 2018-12-14 DIAGNOSIS — Z94.81 S/P ALLOGENEIC BONE MARROW TRANSPLANT (H): ICD-10-CM

## 2018-12-14 DIAGNOSIS — R11.0 NAUSEA: ICD-10-CM

## 2018-12-14 DIAGNOSIS — C91.01 ACUTE LYMPHOBLASTIC LEUKEMIA (ALL) IN REMISSION (H): Primary | ICD-10-CM

## 2018-12-14 LAB
ALBUMIN SERPL-MCNC: 3.8 G/DL (ref 3.4–5)
ALP SERPL-CCNC: 128 U/L (ref 40–150)
ALT SERPL W P-5'-P-CCNC: 135 U/L (ref 0–70)
ANION GAP SERPL CALCULATED.3IONS-SCNC: 5 MMOL/L (ref 3–14)
ANISOCYTOSIS BLD QL SMEAR: ABNORMAL
AST SERPL W P-5'-P-CCNC: ABNORMAL U/L (ref 0–45)
BASOPHILS # BLD AUTO: 0.1 10E9/L (ref 0–0.2)
BASOPHILS NFR BLD AUTO: 1 %
BILIRUB SERPL-MCNC: 0.5 MG/DL (ref 0.2–1.3)
BUN SERPL-MCNC: 18 MG/DL (ref 7–30)
CALCIUM SERPL-MCNC: 8.5 MG/DL (ref 8.5–10.1)
CHLORIDE SERPL-SCNC: 107 MMOL/L (ref 94–109)
CO2 SERPL-SCNC: 27 MMOL/L (ref 20–32)
COPATH REPORT: NORMAL
CREAT SERPL-MCNC: 0.67 MG/DL (ref 0.66–1.25)
CREAT UR-MCNC: 182 MG/DL
DIFFERENTIAL METHOD BLD: ABNORMAL
EOSINOPHIL # BLD AUTO: 0.3 10E9/L (ref 0–0.7)
EOSINOPHIL NFR BLD AUTO: 4.7 %
ERYTHROCYTE [DISTWIDTH] IN BLOOD BY AUTOMATED COUNT: 21.4 % (ref 10–15)
GFR SERPL CREATININE-BSD FRML MDRD: >90 ML/MIN/1.7M2
GLUCOSE SERPL-MCNC: 102 MG/DL (ref 70–99)
HCT VFR BLD AUTO: 33.6 % (ref 40–53)
HGB BLD-MCNC: 11.4 G/DL (ref 13.3–17.7)
LYMPHOCYTES # BLD AUTO: 0.8 10E9/L (ref 0.8–5.3)
LYMPHOCYTES NFR BLD AUTO: 13.2 %
MACROCYTES BLD QL SMEAR: PRESENT
MAGNESIUM SERPL-MCNC: 2.1 MG/DL (ref 1.6–2.3)
MCH RBC QN AUTO: 32.2 PG (ref 26.5–33)
MCHC RBC AUTO-ENTMCNC: 33.9 G/DL (ref 31.5–36.5)
MCV RBC AUTO: 95 FL (ref 78–100)
METAMYELOCYTES # BLD: 0.1 10E9/L
METAMYELOCYTES NFR BLD MANUAL: 0.9 %
MICROCYTES BLD QL SMEAR: PRESENT
MONOCYTES # BLD AUTO: 1 10E9/L (ref 0–1.3)
MONOCYTES NFR BLD AUTO: 16 %
NEUTROPHILS # BLD AUTO: 4.1 10E9/L (ref 1.6–8.3)
NEUTROPHILS NFR BLD AUTO: 64.2 %
PHOSPHATE SERPL-MCNC: 4 MG/DL (ref 2.5–4.5)
PLATELET # BLD AUTO: 220 10E9/L (ref 150–450)
PLATELET # BLD EST: NORMAL 10*3/UL
POLYCHROMASIA BLD QL SMEAR: SLIGHT
POTASSIUM SERPL-SCNC: 4.3 MMOL/L (ref 3.4–5.3)
PROT SERPL-MCNC: 7.2 G/DL (ref 6.8–8.8)
PROT UR-MCNC: 0.28 G/L
PROT/CREAT 24H UR: 0.15 G/G CR (ref 0–0.2)
RBC # BLD AUTO: 3.54 10E12/L (ref 4.4–5.9)
SODIUM SERPL-SCNC: 139 MMOL/L (ref 133–144)
TACROLIMUS BLD-MCNC: 4.6 UG/L (ref 5–15)
TME LAST DOSE: ABNORMAL H
WBC # BLD AUTO: 6.4 10E9/L (ref 4–11)

## 2018-12-14 PROCEDURE — 36592 COLLECT BLOOD FROM PICC: CPT | Performed by: NURSE PRACTITIONER

## 2018-12-14 PROCEDURE — 80197 ASSAY OF TACROLIMUS: CPT | Performed by: PEDIATRICS

## 2018-12-14 PROCEDURE — 83735 ASSAY OF MAGNESIUM: CPT | Performed by: NURSE PRACTITIONER

## 2018-12-14 PROCEDURE — G0463 HOSPITAL OUTPT CLINIC VISIT: HCPCS | Mod: ZF

## 2018-12-14 PROCEDURE — 80053 COMPREHEN METABOLIC PANEL: CPT | Performed by: NURSE PRACTITIONER

## 2018-12-14 PROCEDURE — 84156 ASSAY OF PROTEIN URINE: CPT | Performed by: NURSE PRACTITIONER

## 2018-12-14 PROCEDURE — 85025 COMPLETE CBC W/AUTO DIFF WBC: CPT | Performed by: NURSE PRACTITIONER

## 2018-12-14 PROCEDURE — 84100 ASSAY OF PHOSPHORUS: CPT | Performed by: NURSE PRACTITIONER

## 2018-12-14 RX ORDER — LORAZEPAM 1 MG/1
0.5 TABLET ORAL 2 TIMES DAILY PRN
Qty: 60 TABLET | Refills: 0 | COMMUNITY
Start: 2018-12-14 | End: 2018-12-28

## 2018-12-14 ASSESSMENT — MIFFLIN-ST. JEOR: SCORE: 1633.12

## 2018-12-14 NOTE — NURSING NOTE
"Chief Complaint   Patient presents with     RECHECK     BMT follow up      Vitals:    12/14/18 0952   BP: 114/77   BP Location: Right arm   Patient Position: Chair   Cuff Size: Adult Regular   Pulse: 90   Resp: 20   Temp: 98.3  F (36.8  C)   TempSrc: Oral   SpO2: 98%   Weight: 138 lb 14.2 oz (63 kg)   Height: 5' 9.17\" (175.7 cm)     Radha Rutledge LPN  December 14, 2018  "

## 2018-12-14 NOTE — PROGRESS NOTES
"December 14, 2018    Dear Dr. Paniagua,     We had the pleasure of seeing Artemio Nino in our [facility] today. 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 He is here today with his mother and father for follow up care and labs.     Since we last saw Isael 3 days ago there have been no acute changes. He has switch from voriconazole to fluconazole and his skin rash is improving. He is using TCM once every other day as needed and it is mostly for a remnant rash on his back. He is eating a little bit more as compared to one week ago. He ate two pieces of chicken yesterday and had 2 days this week where he ate an entire [employer] kids meal. He snacks a little bit the rest of the day. He is drinking the equivalent of about 2 water bottles/day. The morning abdominal pain is improving and is occurring less frequently. He denies nausea/vomiting or diarrhea. He falls asleep fine but wakes up at 4am with difficulty going back to sleep. He is using 0.5 mg ativan for that reason. He has not yet tried the increased dose of melatonin.      Review of Systems: Pertinent positives include those mentioned in interval events. A complete review of systems was performed and is otherwise negative.       Family History: unchanged from previous visit    Social History: unchanged from previous visit    Medications:  Please see MAR     Physical Exam:  /77 (BP Location: Right arm, Patient Position: Chair, Cuff Size: Adult Regular)   Pulse 90   Temp 98.3  F (36.8  C) (Oral)   Resp 20   Ht 1.757 m (5' 9.17\")   Wt 63 kg (138 lb 14.2 oz)   SpO2 98%   BMI 20.41 kg/m    FEN: Sitting on exam table. Awake and alert and in no apparent distress. Mother present.   HEENT: Alopecia, normocephalic, atraumatic, sclera clear, MMM, oral lesions essentially resolved, nares patent without discharge    CARD: Heart regular rate and " rhythm. No murmurs, rubs or gallops. Cap refill 2 seconds.  RESP: Lungs clear in upper lobes, diminished in bases, shallow respirations.  No increased work of breathing, crackles or wheezes.   ABD: Non-distended, non-tender, no organomegaly  EXTREM: No edema noted  SKIN: Scattered erythematous confluent macular rash over bilateral dorsal forearms with patchy involvement of upper abdomen and chest.     Labs:    Results for orders placed or performed in visit on 12/14/18   Protein  random urine with Creat Ratio   Result Value Ref Range    Protein Random Urine 0.28 g/L    Protein Total Urine g/gr Creatinine 0.15 0 - 0.2 g/g Cr   CBC with platelets differential   Result Value Ref Range    WBC 6.4 4.0 - 11.0 10e9/L    RBC Count 3.54 (L) 4.4 - 5.9 10e12/L    Hemoglobin 11.4 (L) 13.3 - 17.7 g/dL    Hematocrit 33.6 (L) 40.0 - 53.0 %    MCV 95 78 - 100 fl    MCH 32.2 26.5 - 33.0 pg    MCHC 33.9 31.5 - 36.5 g/dL    RDW 21.4 (H) 10.0 - 15.0 %    Platelet Count 220 150 - 450 10e9/L    Diff Method Manual Differential     % Neutrophils 64.2 %    % Lymphocytes 13.2 %    % Monocytes 16.0 %    % Eosinophils 4.7 %    % Basophils 1.0 %    % Metamyelocytes 0.9 %    Absolute Neutrophil 4.1 1.6 - 8.3 10e9/L    Absolute Lymphocytes 0.8 0.8 - 5.3 10e9/L    Absolute Monocytes 1.0 0.0 - 1.3 10e9/L    Absolute Eosinophils 0.3 0.0 - 0.7 10e9/L    Absolute Basophils 0.1 0.0 - 0.2 10e9/L    Absolute Metamyelocytes 0.1 (H) 0 10e9/L    Anisocytosis Moderate     Polychromasia Slight     Microcytes Present     Macrocytes Present     Platelet Estimate Normal    Phosphorus   Result Value Ref Range    Phosphorus 4.0 2.5 - 4.5 mg/dL   Magnesium   Result Value Ref Range    Magnesium 2.1 1.6 - 2.3 mg/dL   Comprehensive metabolic panel   Result Value Ref Range    Sodium 139 133 - 144 mmol/L    Potassium 4.3 3.4 - 5.3 mmol/L    Chloride 107 94 - 109 mmol/L    Carbon Dioxide 27 20 - 32 mmol/L    Anion Gap 5 3 - 14 mmol/L    Glucose 102 (H) 70 - 99 mg/dL     Urea Nitrogen 18 7 - 30 mg/dL    Creatinine 0.67 0.66 - 1.25 mg/dL    GFR Estimate >90 >60 mL/min/1.7m2    GFR Estimate If Black >90 >60 mL/min/1.7m2    Calcium 8.5 8.5 - 10.1 mg/dL    Bilirubin Total 0.5 0.2 - 1.3 mg/dL    Albumin 3.8 3.4 - 5.0 g/dL    Protein Total 7.2 6.8 - 8.8 g/dL    Alkaline Phosphatase 128 40 - 150 U/L     (H) 0 - 70 U/L    AST Unsatisfactory specimen - hemolyzed 0 - 45 U/L   Creatinine urine calculation only   Result Value Ref Range    Creatinine Urine 182 mg/dL     Pending: CMV       Assessment/Plan:  Artemio is a 18 year old male with 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 Post transplant complications include C. diff diarrhea (resolved), rash not consistent with GVHD and likely 2/2 voriconazole (improving with TCM and switching to fluconazole), and poor oral nutrition (improving, on TPN/IL). Encouraged Isael to use melatonin rather than ativan if difficulty sleeping.    BMT:   High risk B cell ALL (CNS negative) + JAK2 activation/BMT: bone marrow biopsy prior to transplant (10/15) MRD 0.006%.   Conditioning per 2015-29: TBI days -4 through -1 followed by matched sibling donor transplant on 11/2   Engrafted day +15, PB VNTR 100% donor, unseparated Day +28 (11/27) bone marrow biopsy, 20-30% donor with trilineage hematopoesis, flow & FISH negative, 100% donor. Chromosomes pending.      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  Continue tacrolimus. Tacrolimus level is 4.6, goal level 5-10. Expected drop since voriconazole was stopped earlier this week. Increase dose by 20% to 0.6mL BID and recheck a level when in clinic 12/19. Difficult for family to come to clinic for just a lab draw.     FEN/Renal:  Risk for malnutrition: continue TPN/IL, cycled over 12 hours. Improved amount of PO intake. Family will write down what he is eating the next week. Continue marinol BID for appetite stimulation and appreciate dietician recs.  Electrolytes  stable.  Currently no evidence of TMA- continue to monitor weekly.       Pulmonary:  Pneumomediastinum: finding upon work up Chest CT (10/22), asymptomatic. Per radiology, likely benign, perhaps transient. No issues.      Cardiovascular: Aaron-Parkinson-White Syndrome: diagnosed upon syncope in 02/2018. Asymptomatic, no interventions to date. Work up EKG c/w WPW, sinus bradycardia at 49 bpm. Work up ECHO normal with EF 68%.     Heme:  Pancytopenia secondary to chemotherapy:   - Transfuse for hemoglobin < 8, platelets < 10,000, no premedications  - GCSF PRN for ANC < 1000      Infectious Disease: Continue prophylaxis with valtrex, bactrim, and fluconazole.                                                                                                                           GI:   Reflux precaution: Continue protonix until PO intake improved. Nausea management: Off scheduled antiemetics.       Neuro: Insomnia- try increased melatonin 6mg at night as needed.     Derm: Rash: biopsy consistent with drug eruption and now improved after switching form voriconazole to fluconazole.  Has not worsened after restarting bactrim.     Social-He can go see his grandmother and his dogs.  He can also have visits with his friends.       Disposition: RTC 12/21 for labs and exam with Viky Zavala.    Sincerely,     Viky Zavala MD, PhD    Pediatric Blood and Marrow Transplant  [facility]      Written by Zeynep Melara DO  Pediatric Blood and Marrow Transplant Fellow  [facility]  Pager [phone]    I, Viky Zavala MD PhD, saw this patient with the fellow and agree with the fellow s findings and plan of care as documented in the fellow s note.

## 2018-12-14 NOTE — PROGRESS NOTES
This is a recent snapshot of the patient's [facility] medical record.  For current drug dose and complete information and questions, call [phone] or In Basket pool, [facility] home infusion ([id number])  CSN Number:  [id number]

## 2018-12-14 NOTE — LETTER
"  12/14/2018      RE: Artemio Nino  [address] [id number]       December 14, 2018    Dear Dr. Paniagua,     We had the pleasure of seeing Artemio Nino in our [facility] Clinic today. 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 He is here today with his mother and father for follow up care and labs.     Since we last saw Isael 3 days ago there have been no acute changes. He has switch from voriconazole to fluconazole and his skin rash is improving. He is using TCM once every other day as needed and it is mostly for a remnant rash on his back. He is eating a little bit more as compared to one week ago. He ate two pieces of chicken yesterday and had 2 days this week where he ate an entire [employer] kids meal. He snacks a little bit the rest of the day. He is drinking the equivalent of about 2 water bottles/day. The morning abdominal pain is improving and is occurring less frequently. He denies nausea/vomiting or diarrhea. He falls asleep fine but wakes up at 4am with difficulty going back to sleep. He is using 0.5 mg ativan for that reason. He has not yet tried the increased dose of melatonin.      Review of Systems: Pertinent positives include those mentioned in interval events. A complete review of systems was performed and is otherwise negative.       Family History: unchanged from previous visit    Social History: unchanged from previous visit    Medications:  Please see MAR     Physical Exam:  /77 (BP Location: Right arm, Patient Position: Chair, Cuff Size: Adult Regular)   Pulse 90   Temp 98.3  F (36.8  C) (Oral)   Resp 20   Ht 1.757 m (5' 9.17\")   Wt 63 kg (138 lb 14.2 oz)   SpO2 98%   BMI 20.41 kg/m     FEN: Sitting on exam table. Awake and alert and in no apparent distress. Mother present.   HEENT: Alopecia, normocephalic, atraumatic, sclera clear, MMM, " oral lesions essentially resolved, nares patent without discharge    CARD: Heart regular rate and rhythm. No murmurs, rubs or gallops. Cap refill 2 seconds.  RESP: Lungs clear in upper lobes, diminished in bases, shallow respirations.  No increased work of breathing, crackles or wheezes.   ABD: Non-distended, non-tender, no organomegaly  EXTREM: No edema noted  SKIN: Scattered erythematous confluent macular rash over bilateral dorsal forearms with patchy involvement of upper abdomen and chest.     Labs:    Results for orders placed or performed in visit on 12/14/18   Protein  random urine with Creat Ratio   Result Value Ref Range    Protein Random Urine 0.28 g/L    Protein Total Urine g/gr Creatinine 0.15 0 - 0.2 g/g Cr   CBC with platelets differential   Result Value Ref Range    WBC 6.4 4.0 - 11.0 10e9/L    RBC Count 3.54 (L) 4.4 - 5.9 10e12/L    Hemoglobin 11.4 (L) 13.3 - 17.7 g/dL    Hematocrit 33.6 (L) 40.0 - 53.0 %    MCV 95 78 - 100 fl    MCH 32.2 26.5 - 33.0 pg    MCHC 33.9 31.5 - 36.5 g/dL    RDW 21.4 (H) 10.0 - 15.0 %    Platelet Count 220 150 - 450 10e9/L    Diff Method Manual Differential     % Neutrophils 64.2 %    % Lymphocytes 13.2 %    % Monocytes 16.0 %    % Eosinophils 4.7 %    % Basophils 1.0 %    % Metamyelocytes 0.9 %    Absolute Neutrophil 4.1 1.6 - 8.3 10e9/L    Absolute Lymphocytes 0.8 0.8 - 5.3 10e9/L    Absolute Monocytes 1.0 0.0 - 1.3 10e9/L    Absolute Eosinophils 0.3 0.0 - 0.7 10e9/L    Absolute Basophils 0.1 0.0 - 0.2 10e9/L    Absolute Metamyelocytes 0.1 (H) 0 10e9/L    Anisocytosis Moderate     Polychromasia Slight     Microcytes Present     Macrocytes Present     Platelet Estimate Normal    Phosphorus   Result Value Ref Range    Phosphorus 4.0 2.5 - 4.5 mg/dL   Magnesium   Result Value Ref Range    Magnesium 2.1 1.6 - 2.3 mg/dL   Comprehensive metabolic panel   Result Value Ref Range    Sodium 139 133 - 144 mmol/L    Potassium 4.3 3.4 - 5.3 mmol/L    Chloride 107 94 - 109 mmol/L     Carbon Dioxide 27 20 - 32 mmol/L    Anion Gap 5 3 - 14 mmol/L    Glucose 102 (H) 70 - 99 mg/dL    Urea Nitrogen 18 7 - 30 mg/dL    Creatinine 0.67 0.66 - 1.25 mg/dL    GFR Estimate >90 >60 mL/min/1.7m2    GFR Estimate If Black >90 >60 mL/min/1.7m2    Calcium 8.5 8.5 - 10.1 mg/dL    Bilirubin Total 0.5 0.2 - 1.3 mg/dL    Albumin 3.8 3.4 - 5.0 g/dL    Protein Total 7.2 6.8 - 8.8 g/dL    Alkaline Phosphatase 128 40 - 150 U/L     (H) 0 - 70 U/L    AST Unsatisfactory specimen - hemolyzed 0 - 45 U/L   Creatinine urine calculation only   Result Value Ref Range    Creatinine Urine 182 mg/dL     Pending: CMV       Assessment/Plan:  Artemio is a 18 year old male with 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 Post transplant complications include C. diff diarrhea (resolved), rash not consistent with GVHD and likely 2/2 voriconazole (improving with TCM and switching to fluconazole), and poor oral nutrition (improving, on TPN/IL). Encouraged Isael to use melatonin rather than ativan if difficulty sleeping.    BMT:   High risk B cell ALL (CNS negative) + JAK2 activation/BMT: bone marrow biopsy prior to transplant (10/15) MRD 0.006%.   Conditioning per 2015-29: TBI days -4 through -1 followed by matched sibling donor transplant on 11/2   Engrafted day +15, PB VNTR 100% donor, unseparated Day +28 (11/27) bone marrow biopsy, 20-30% donor with trilineage hematopoesis, flow & FISH negative, 100% donor. Chromosomes pending.      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  Continue tacrolimus. Tacrolimus level is 4.6, goal level 5-10. Expected drop since voriconazole was stopped earlier this week. Increase dose by 20% to 0.6mL BID and recheck a level when in clinic 12/19. Difficult for family to come to clinic for just a lab draw.     FEN/Renal:  Risk for malnutrition: continue TPN/IL, cycled over 12 hours. Improved amount of PO intake. Family will write down what he is eating the next  week. Continue marinol BID for appetite stimulation and appreciate dietician recs.  Electrolytes stable.  Currently no evidence of TMA- continue to monitor weekly.       Pulmonary:  Pneumomediastinum: finding upon work up Chest CT (10/22), asymptomatic. Per radiology, likely benign, perhaps transient. No issues.      Cardiovascular: Aaron-Parkinson-White Syndrome: diagnosed upon syncope in 02/2018. Asymptomatic, no interventions to date. Work up EKG c/w WPW, sinus bradycardia at 49 bpm. Work up ECHO normal with EF 68%.     Heme:  Pancytopenia secondary to chemotherapy:   - Transfuse for hemoglobin < 8, platelets < 10,000, no premedications  - GCSF PRN for ANC < 1000      Infectious Disease: Continue prophylaxis with valtrex, bactrim, and fluconazole.                                                                                                                           GI:   Reflux precaution: Continue protonix until PO intake improved. Nausea management: Off scheduled antiemetics.       Neuro: Insomnia- try increased melatonin 6mg at night as needed.     Derm: Rash: biopsy consistent with drug eruption and now improved after switching form voriconazole to fluconazole.  Has not worsened after restarting bactrim.     Social-He can go see his grandmother and his dogs.  He can also have visits with his friends.       Disposition: RTC 12/21 for labs and exam with Viky Zavala.    Sincerely,     Viky Zavala MD, PhD    Pediatric Blood and Marrow Transplant  [facility]      Written by Zeynep Melara DO  Pediatric Blood and Marrow Transplant Fellow  [facility]  Pager [phone]    I, Viky Zavala MD PhD, saw this patient with the fellow and agree with the fellow s findings and plan of care as documented in the fellow s note.        Viky Zavala MD

## 2018-12-14 NOTE — PHARMACY-CONSULT NOTE
Outpatient TPN Monitoring  Artemio's TPN formula, labs, and nutritional status were reviewed today. The following TPN changes are recommended: NO CHANGES  Discussed with Viky Zavala MD and communicated to [facility].     Artemio will return to clinic on Wednesday, 12/19 for labs and reassessment.      The following reflects the current TPN formula:  Dosing Weight: 65.1 kg  Volume: 1390 ml, cycled over 12 hours  Protein: 98 g/day   Dextrose: 255 g  Lipids: 240 mL      Sodium: 50 mEq/day  Potassium:   10 mEq/day  Calcium: 5 mEq/day  Magnesium:  45 mEq/day  Phosphorus:  0 mmol/day  Chloride:Acetate ratio:  1:2   Trace elements with Selenium: standard  Multivitamins: standard  Vitamin K:  2.5 mg/day      Pharmacy will continue to follow.  Cathryn Jennissen, PharmD, BCOP

## 2018-12-14 NOTE — LETTER
"  12/14/2018      RE: Artemio Nino  [address] [address]       December 14, 2018    Dear Dr. Paniagua,     We had the pleasure of seeing Artemio Nino in our [facility] today. 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 He is here today with his mother and father for follow up care and labs.     Since we last saw Isael 3 days ago there have been no acute changes. He has switch from voriconazole to fluconazole and his skin rash is improving. He is using TCM once every other day as needed and it is mostly for a remnant rash on his back. He is eating a little bit more as compared to one week ago. He ate two pieces of chicken yesterday and had 2 days this week where he ate an entire [employer] kids meal. He snacks a little bit the rest of the day. He is drinking the equivalent of about 2 water bottles/day. The morning abdominal pain is improving and is occurring less frequently. He denies nausea/vomiting or diarrhea. He falls asleep fine but wakes up at 4am with difficulty going back to sleep. He is using 0.5 mg ativan for that reason. He has not yet tried the increased dose of melatonin.      Review of Systems: Pertinent positives include those mentioned in interval events. A complete review of systems was performed and is otherwise negative.       Family History: unchanged from previous visit    Social History: unchanged from previous visit    Medications:  Please see MAR     Physical Exam:  /77 (BP Location: Right arm, Patient Position: Chair, Cuff Size: Adult Regular)   Pulse 90   Temp 98.3  F (36.8  C) (Oral)   Resp 20   Ht 1.757 m (5' 9.17\")   Wt 63 kg (138 lb 14.2 oz)   SpO2 98%   BMI 20.41 kg/m     FEN: Sitting on exam table. Awake and alert and in no apparent distress. Mother present.   HEENT: Alopecia, normocephalic, atraumatic, sclera clear, MMM, " oral lesions essentially resolved, nares patent without discharge    CARD: Heart regular rate and rhythm. No murmurs, rubs or gallops. Cap refill 2 seconds.  RESP: Lungs clear in upper lobes, diminished in bases, shallow respirations.  No increased work of breathing, crackles or wheezes.   ABD: Non-distended, non-tender, no organomegaly  EXTREM: No edema noted  SKIN: Scattered erythematous confluent macular rash over bilateral dorsal forearms with patchy involvement of upper abdomen and chest.     Labs:    Results for orders placed or performed in visit on 12/14/18   Protein  random urine with Creat Ratio   Result Value Ref Range    Protein Random Urine 0.28 g/L    Protein Total Urine g/gr Creatinine 0.15 0 - 0.2 g/g Cr   CBC with platelets differential   Result Value Ref Range    WBC 6.4 4.0 - 11.0 10e9/L    RBC Count 3.54 (L) 4.4 - 5.9 10e12/L    Hemoglobin 11.4 (L) 13.3 - 17.7 g/dL    Hematocrit 33.6 (L) 40.0 - 53.0 %    MCV 95 78 - 100 fl    MCH 32.2 26.5 - 33.0 pg    MCHC 33.9 31.5 - 36.5 g/dL    RDW 21.4 (H) 10.0 - 15.0 %    Platelet Count 220 150 - 450 10e9/L    Diff Method Manual Differential     % Neutrophils 64.2 %    % Lymphocytes 13.2 %    % Monocytes 16.0 %    % Eosinophils 4.7 %    % Basophils 1.0 %    % Metamyelocytes 0.9 %    Absolute Neutrophil 4.1 1.6 - 8.3 10e9/L    Absolute Lymphocytes 0.8 0.8 - 5.3 10e9/L    Absolute Monocytes 1.0 0.0 - 1.3 10e9/L    Absolute Eosinophils 0.3 0.0 - 0.7 10e9/L    Absolute Basophils 0.1 0.0 - 0.2 10e9/L    Absolute Metamyelocytes 0.1 (H) 0 10e9/L    Anisocytosis Moderate     Polychromasia Slight     Microcytes Present     Macrocytes Present     Platelet Estimate Normal    Phosphorus   Result Value Ref Range    Phosphorus 4.0 2.5 - 4.5 mg/dL   Magnesium   Result Value Ref Range    Magnesium 2.1 1.6 - 2.3 mg/dL   Comprehensive metabolic panel   Result Value Ref Range    Sodium 139 133 - 144 mmol/L    Potassium 4.3 3.4 - 5.3 mmol/L    Chloride 107 94 - 109 mmol/L     Carbon Dioxide 27 20 - 32 mmol/L    Anion Gap 5 3 - 14 mmol/L    Glucose 102 (H) 70 - 99 mg/dL    Urea Nitrogen 18 7 - 30 mg/dL    Creatinine 0.67 0.66 - 1.25 mg/dL    GFR Estimate >90 >60 mL/min/1.7m2    GFR Estimate If Black >90 >60 mL/min/1.7m2    Calcium 8.5 8.5 - 10.1 mg/dL    Bilirubin Total 0.5 0.2 - 1.3 mg/dL    Albumin 3.8 3.4 - 5.0 g/dL    Protein Total 7.2 6.8 - 8.8 g/dL    Alkaline Phosphatase 128 40 - 150 U/L     (H) 0 - 70 U/L    AST Unsatisfactory specimen - hemolyzed 0 - 45 U/L   Creatinine urine calculation only   Result Value Ref Range    Creatinine Urine 182 mg/dL     Pending: CMV       Assessment/Plan:  Artemio is a 18 year old male with 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 Post transplant complications include C. diff diarrhea (resolved), rash not consistent with GVHD and likely 2/2 voriconazole (improving with TCM and switching to fluconazole), and poor oral nutrition (improving, on TPN/IL). Encouraged Isael to use melatonin rather than ativan if difficulty sleeping.    BMT:   High risk B cell ALL (CNS negative) + JAK2 activation/BMT: bone marrow biopsy prior to transplant (10/15) MRD 0.006%.   Conditioning per 2015-29: TBI days -4 through -1 followed by matched sibling donor transplant on 11/2   Engrafted day +15, PB VNTR 100% donor, unseparated Day +28 (11/27) bone marrow biopsy, 20-30% donor with trilineage hematopoesis, flow & FISH negative, 100% donor. Chromosomes pending.      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  Continue tacrolimus. Tacrolimus level is 4.6, goal level 5-10. Expected drop since voriconazole was stopped earlier this week. Increase dose by 20% to 0.6mL BID and recheck a level when in clinic 12/19. Difficult for family to come to clinic for just a lab draw.     FEN/Renal:  Risk for malnutrition: continue TPN/IL, cycled over 12 hours. Improved amount of PO intake. Family will write down what he is eating the next  week. Continue marinol BID for appetite stimulation and appreciate dietician recs.  Electrolytes stable.  Currently no evidence of TMA- continue to monitor weekly.       Pulmonary:  Pneumomediastinum: finding upon work up Chest CT (10/22), asymptomatic. Per radiology, likely benign, perhaps transient. No issues.      Cardiovascular: Aaron-Parkinson-White Syndrome: diagnosed upon syncope in 02/2018. Asymptomatic, no interventions to date. Work up EKG c/w WPW, sinus bradycardia at 49 bpm. Work up ECHO normal with EF 68%.     Heme:  Pancytopenia secondary to chemotherapy:   - Transfuse for hemoglobin < 8, platelets < 10,000, no premedications  - GCSF PRN for ANC < 1000      Infectious Disease: Continue prophylaxis with valtrex, bactrim, and fluconazole.                                                                                                                           GI:   Reflux precaution: Continue protonix until PO intake improved. Nausea management: Off scheduled antiemetics.       Neuro: Insomnia- try increased melatonin 6mg at night as needed.     Derm: Rash: biopsy consistent with drug eruption and now improved after switching form voriconazole to fluconazole.  Has not worsened after restarting bactrim.     Social-He can go see his grandmother and his dogs.  He can also have visits with his friends.       Disposition: RTC 12/21 for labs and exam with Viky Zavala.    Sincerely,     Viky Zavala MD, PhD    Pediatric Blood and Marrow Transplant  [facility]      Written by Zeynep Melara DO  Pediatric Blood and Marrow Transplant Fellow  [facility]  Pager [phone]    I, Viky Zavala MD PhD, saw this patient with the fellow and agree with the fellow s findings and plan of care as documented in the fellow s note.        Viky Zavala MD

## 2018-12-14 NOTE — PATIENT INSTRUCTIONS
Return to [facility] for labs and exam with an POLLO on Wednesday, December 19th around 10AM (labs) and 10:30AM (exam). Please hold tacrolimus prior to visit for blood drug level, and take this medication after level obtained.  Infusion needs: none  Patient has central line to be drawn off of per lab.   Medication changes: none  Care plan changes: none  Contact information  During business hours (7:30am-4:30pm):   To leave a non-urgent voicemail: call triage line [phone]    To call for time-sensitive needs or concerns : call clinic  [phone]  Evenings after 4:30pm, weekends, and holidays:   For any needs or concerns: call for BMT fellow at [phone] in the case of an emergency  Thank you!   Patient is scheduled for follow up on 12/19/18 at 10:30am for exam with Jacqueline Shin as of 12/17/18/ at 10:41am PANTER[facility]

## 2018-12-17 NOTE — PROGRESS NOTES
This is a recent snapshot of the patient's [facility] medical record.  For current drug dose and complete information and questions, call [phone] or In Basket pool, [facility] home infusion ([id number])  CSN Number:  [id number]

## 2018-12-18 ENCOUNTER — HOME INFUSION (PRE-WILLOW HOME INFUSION) (OUTPATIENT)
Dept: PHARMACY | Facility: CLINIC | Age: 18
End: 2018-12-18

## 2018-12-19 ENCOUNTER — TELEPHONE (OUTPATIENT)
Dept: ONCOLOGY | Facility: CLINIC | Age: 18
End: 2018-12-19

## 2018-12-19 ENCOUNTER — ONCOLOGY VISIT (OUTPATIENT)
Dept: TRANSPLANT | Facility: CLINIC | Age: 18
End: 2018-12-19
Attending: NURSE PRACTITIONER
Payer: COMMERCIAL

## 2018-12-19 ENCOUNTER — HOME INFUSION (PRE-WILLOW HOME INFUSION) (OUTPATIENT)
Dept: PHARMACY | Facility: CLINIC | Age: 18
End: 2018-12-19

## 2018-12-19 ENCOUNTER — HOSPITAL ENCOUNTER (OUTPATIENT)
Dept: GENERAL RADIOLOGY | Facility: CLINIC | Age: 18
Discharge: HOME | End: 2018-12-19
Attending: NURSE PRACTITIONER | Admitting: NURSE PRACTITIONER
Payer: COMMERCIAL

## 2018-12-19 ENCOUNTER — ALLIED HEALTH/NURSE VISIT (OUTPATIENT)
Dept: TRANSPLANT | Facility: CLINIC | Age: 18
End: 2018-12-19

## 2018-12-19 VITALS
HEART RATE: 92 BPM | TEMPERATURE: 98.9 F | BODY MASS INDEX: 20.44 KG/M2 | RESPIRATION RATE: 20 BRPM | HEIGHT: 69 IN | SYSTOLIC BLOOD PRESSURE: 120 MMHG | DIASTOLIC BLOOD PRESSURE: 82 MMHG | OXYGEN SATURATION: 99 % | WEIGHT: 138.01 LBS

## 2018-12-19 DIAGNOSIS — R11.0 NAUSEA: ICD-10-CM

## 2018-12-19 DIAGNOSIS — C91.01 ACUTE LYMPHOBLASTIC LEUKEMIA (ALL) IN REMISSION (H): ICD-10-CM

## 2018-12-19 DIAGNOSIS — Z71.9 ENCOUNTER FOR COUNSELING: Primary | ICD-10-CM

## 2018-12-19 LAB
ALBUMIN SERPL-MCNC: 4.1 G/DL (ref 3.4–5)
ALP SERPL-CCNC: 86 U/L (ref 40–150)
ALT SERPL W P-5'-P-CCNC: 74 U/L (ref 0–70)
ANION GAP SERPL CALCULATED.3IONS-SCNC: 7 MMOL/L (ref 3–14)
AST SERPL W P-5'-P-CCNC: 38 U/L (ref 0–45)
BASOPHILS # BLD AUTO: 0.1 10E9/L (ref 0–0.2)
BASOPHILS NFR BLD AUTO: 0.8 %
BILIRUB SERPL-MCNC: 0.5 MG/DL (ref 0.2–1.3)
BUN SERPL-MCNC: 21 MG/DL (ref 7–30)
CALCIUM SERPL-MCNC: 8.7 MG/DL (ref 8.5–10.1)
CHLORIDE SERPL-SCNC: 106 MMOL/L (ref 94–109)
CO2 SERPL-SCNC: 23 MMOL/L (ref 20–32)
CREAT SERPL-MCNC: 0.92 MG/DL (ref 0.66–1.25)
CREAT UR-MCNC: 200 MG/DL
DIFFERENTIAL METHOD BLD: ABNORMAL
EOSINOPHIL # BLD AUTO: 0.3 10E9/L (ref 0–0.7)
EOSINOPHIL NFR BLD AUTO: 3.7 %
ERYTHROCYTE [DISTWIDTH] IN BLOOD BY AUTOMATED COUNT: 20.6 % (ref 10–15)
GFR SERPL CREATININE-BSD FRML MDRD: >90 ML/MIN/{1.73_M2}
GLUCOSE SERPL-MCNC: 112 MG/DL (ref 70–99)
HCT VFR BLD AUTO: 34.9 % (ref 40–53)
HGB BLD-MCNC: 11.7 G/DL (ref 13.3–17.7)
IMM GRANULOCYTES # BLD: 0.2 10E9/L (ref 0–0.4)
IMM GRANULOCYTES NFR BLD: 2.4 %
LDH SERPL L TO P-CCNC: 256 U/L (ref 85–227)
LYMPHOCYTES # BLD AUTO: 1 10E9/L (ref 0.8–5.3)
LYMPHOCYTES NFR BLD AUTO: 11.3 %
MAGNESIUM SERPL-MCNC: 2.3 MG/DL (ref 1.6–2.3)
MCH RBC QN AUTO: 32.8 PG (ref 26.5–33)
MCHC RBC AUTO-ENTMCNC: 33.5 G/DL (ref 31.5–36.5)
MCV RBC AUTO: 98 FL (ref 78–100)
MONOCYTES # BLD AUTO: 1.1 10E9/L (ref 0–1.3)
MONOCYTES NFR BLD AUTO: 13.2 %
NEUTROPHILS # BLD AUTO: 5.8 10E9/L (ref 1.6–8.3)
NEUTROPHILS NFR BLD AUTO: 68.6 %
NRBC # BLD AUTO: 0 10*3/UL
NRBC BLD AUTO-RTO: 0 /100
PHOSPHATE SERPL-MCNC: 4.6 MG/DL (ref 2.5–4.5)
PLATELET # BLD AUTO: 221 10E9/L (ref 150–450)
POTASSIUM SERPL-SCNC: 4.3 MMOL/L (ref 3.4–5.3)
PROT SERPL-MCNC: 7.3 G/DL (ref 6.8–8.8)
PROT UR-MCNC: 0.26 G/L
PROT/CREAT 24H UR: 0.13 G/G CR (ref 0–0.2)
RBC # BLD AUTO: 3.57 10E12/L (ref 4.4–5.9)
SODIUM SERPL-SCNC: 136 MMOL/L (ref 133–144)
TACROLIMUS BLD-MCNC: 3.2 UG/L (ref 5–15)
TME LAST DOSE: ABNORMAL H
WBC # BLD AUTO: 8.5 10E9/L (ref 4–11)

## 2018-12-19 PROCEDURE — 84156 ASSAY OF PROTEIN URINE: CPT | Performed by: PEDIATRICS

## 2018-12-19 PROCEDURE — 85025 COMPLETE CBC W/AUTO DIFF WBC: CPT | Performed by: PEDIATRICS

## 2018-12-19 PROCEDURE — 80197 ASSAY OF TACROLIMUS: CPT | Performed by: PEDIATRICS

## 2018-12-19 PROCEDURE — G0463 HOSPITAL OUTPT CLINIC VISIT: HCPCS

## 2018-12-19 PROCEDURE — 36592 COLLECT BLOOD FROM PICC: CPT | Performed by: PEDIATRICS

## 2018-12-19 PROCEDURE — 83735 ASSAY OF MAGNESIUM: CPT | Performed by: PEDIATRICS

## 2018-12-19 PROCEDURE — 80053 COMPREHEN METABOLIC PANEL: CPT | Performed by: PEDIATRICS

## 2018-12-19 PROCEDURE — 83615 LACTATE (LD) (LDH) ENZYME: CPT | Performed by: PEDIATRICS

## 2018-12-19 PROCEDURE — G0463 HOSPITAL OUTPT CLINIC VISIT: HCPCS | Mod: ZF

## 2018-12-19 PROCEDURE — 71045 X-RAY EXAM CHEST 1 VIEW: CPT

## 2018-12-19 PROCEDURE — 84100 ASSAY OF PHOSPHORUS: CPT | Performed by: PEDIATRICS

## 2018-12-19 RX ORDER — GRANISETRON HYDROCHLORIDE 1 MG/1
1 TABLET, FILM COATED ORAL EVERY 12 HOURS PRN
Qty: 60 TABLET | Refills: 3 | COMMUNITY
Start: 2018-12-19 | End: 2019-02-15

## 2018-12-19 ASSESSMENT — MIFFLIN-ST. JEOR: SCORE: 1626.63

## 2018-12-19 NOTE — PROGRESS NOTES
"Pediatric BMT Outpatient Progress Note  Date of Service: 12/19/18    Interval History: Artemio returns for labs and exam today, as he is day +47 post MSD BMT. He remains afebrile and clinically well. No URI symptoms, fatigue, or GI dysregulation. He did have two episodes of vomiting which is new but he denies ongoing nausea and attributes these to \"flukes\" without need for intervention. Otherwise he continues to eat better overall. Still working on PO fluid intake- he drinks about 250-500 mls per day and continues on TPN, however his creatinine is up-trending. Abdominal pain has resolved. Rash is fading nicely since discontinuation of Voriconazole. Maintaining energy and positive demeanor as he continues to heal from transplant. Artemio did note that his CVC site used to have one stitch, however now has three stitches with concern that the line has been pulled a bit. It is functioning well without issues.     Review of Systems: Pertinent positives include those mentioned in interval events. A complete review of systems was performed and is otherwise negative.       Family History: unchanged from previous visit    Social History: unchanged from previous visit    Medications:  Please see MAR     Physical Exam:  Vital Signs for Peds 12/19/2018   SYSTOLIC 120   DIASTOLIC 82   PULSE 92   TEMPERATURE 98.9   RESPIRATIONS 20   WEIGHT (kg) 62.6 kg   HEIGHT (cm) 175.3 cm   BMI 20.41   pain    O2 99     FEN: Sitting on exam table. Awake and alert and in no apparent distress. Mother and father present.   HEENT: Alopecia, normocephalic, atraumatic, sclera clear. MMM, no oral lesions, slight erythema in posterior OP. Nares patent without discharge    CARD: Heart regular rate and rhythm. No murmurs, rubs or gallops. Cap refill 2 seconds.  RESP: Lungs clear in upper lobes, diminished in bases, shallow respirations.  No increased work of breathing, crackles or wheezes.   ABD: Non-distended, non-tender, no organomegaly  EXTREM: No " edema noted  SKIN: Faint light pink patches from resolving rash over bilateral dorsal forearm, upper abdomen and chest.     Labs:    Results for orders placed or performed in visit on 12/19/18   XR Chest 1 View    Narrative    Exam: XR CHEST 1 VW, 12/19/2018 11:22 AM    Indication: 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)    Comparison: Chest CT 10/22/2018.    Findings:   Upright PA view of the chest. Right IJ CVC and left-sided Port-A-Cath  with tips at the low SVC. Lung volumes are normal. Heart size and  pulmonary vasculature are within normal limits. No focal opacity,  pleural effusion or pneumothorax. Fixation hardware of the left  clavicle.      Impression    Impression:   1. No focal airspace disease.  2. Right IJ CVC and left-sided Port-A-Cath with tips at the low SVC.    I have personally reviewed the examination and initial interpretation  and I agree with the findings.    VI BASSETT MD   Protein  random urine with Creat Ratio   Result Value Ref Range    Protein Random Urine 0.26 g/L    Protein Total Urine g/gr Creatinine 0.13 0 - 0.2 g/g Cr   Lactate Dehydrogenase   Result Value Ref Range    Lactate Dehydrogenase 256 (H) 85 - 227 U/L   Phosphorus   Result Value Ref Range    Phosphorus 4.6 (H) 2.5 - 4.5 mg/dL   Magnesium   Result Value Ref Range    Magnesium 2.3 1.6 - 2.3 mg/dL   Comprehensive metabolic panel   Result Value Ref Range    Sodium 136 133 - 144 mmol/L    Potassium 4.3 3.4 - 5.3 mmol/L    Chloride 106 94 - 109 mmol/L    Carbon Dioxide 23 20 - 32 mmol/L    Anion Gap 7 3 - 14 mmol/L    Glucose 112 (H) 70 - 99 mg/dL    Urea Nitrogen 21 7 - 30 mg/dL    Creatinine 0.92 0.66 - 1.25 mg/dL    GFR Estimate >90 >60 mL/min/[1.73_m2]    GFR Estimate If Black >90 >60 mL/min/[1.73_m2]    Calcium 8.7 8.5 - 10.1 mg/dL    Bilirubin Total 0.5 0.2 - 1.3 mg/dL    Albumin 4.1 3.4 - 5.0 g/dL    Protein Total 7.3 6.8 - 8.8 g/dL    Alkaline Phosphatase 86 40 - 150 U/L    ALT 74 (H) 0 - 70 U/L    AST 38 0  - 45 U/L   CBC with platelets differential   Result Value Ref Range    WBC 8.5 4.0 - 11.0 10e9/L    RBC Count 3.57 (L) 4.4 - 5.9 10e12/L    Hemoglobin 11.7 (L) 13.3 - 17.7 g/dL    Hematocrit 34.9 (L) 40.0 - 53.0 %    MCV 98 78 - 100 fl    MCH 32.8 26.5 - 33.0 pg    MCHC 33.5 31.5 - 36.5 g/dL    RDW 20.6 (H) 10.0 - 15.0 %    Platelet Count 221 150 - 450 10e9/L    Diff Method Automated Method     % Neutrophils 68.6 %    % Lymphocytes 11.3 %    % Monocytes 13.2 %    % Eosinophils 3.7 %    % Basophils 0.8 %    % Immature Granulocytes 2.4 %    Nucleated RBCs 0 0 /100    Absolute Neutrophil 5.8 1.6 - 8.3 10e9/L    Absolute Lymphocytes 1.0 0.8 - 5.3 10e9/L    Absolute Monocytes 1.1 0.0 - 1.3 10e9/L    Absolute Eosinophils 0.3 0.0 - 0.7 10e9/L    Absolute Basophils 0.1 0.0 - 0.2 10e9/L    Abs Immature Granulocytes 0.2 0 - 0.4 10e9/L    Absolute Nucleated RBC 0.0    Creatinine urine calculation only   Result Value Ref Range    Creatinine Urine 200 mg/dL     Assessment/Plan:  Artemio is a 18 year old male with 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 Post transplant complications include C. diff diarrhea (resolved), rash not consistent with GVHD and likely 2/2 voriconazole (improving with TCM and switching to fluconazole), and poor oral nutrition (improving, on TPN/IL). Feeling well today- BUN/Creatinine up-trending with fairly low PO intake.     BMT:   High risk B cell ALL (CNS negative) + JAK2 activation/BMT: bone marrow biopsy prior to transplant (10/15) MRD 0.006%.   Conditioning per 2015-29: TBI days -4 through -1 followed by matched sibling donor transplant on 11/2   Engrafted day +15, PB VNTR 100% donor, unseparated Day +28 (11/27) bone marrow biopsy, 20-30% donor with trilineage hematopoesis, flow & FISH negative, 100% donor. Chromosomal analysis resulted without evidence of B-ALL.        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 Continue tacrolimus with goal 5-10.  Level 4.6 last week with susequent dose increase. Recheck level pending from today.      FEN/Renal:  Risk for malnutrition: continue TPN/IL, cycled over 12 hours. Improved amount of PO intake. Family will write down what he is eating the next week. Continue marinol BID for appetite stimulation and appreciate dietician recs.  Electrolytes stable.  Currently no evidence of TMA- continue to monitor weekly, will resume Friday schedule. Add 210 mls of fluid to TPN today (total 1600mls) given increased creatinine today. Encourage PO fluid goal of 750 mls.       Pulmonary:  Pneumomediastinum: finding upon work up Chest CT (10/22), asymptomatic. Per radiology, likely benign, perhaps transient. No issues.      Cardiovascular: Aaron-Parkinson-White Syndrome: diagnosed upon syncope in 02/2018. Asymptomatic, no interventions to date. Work up EKG c/w WPW, sinus bradycardia at 49 bpm. Work up ECHO normal with EF 68%.     Heme:  Pancytopenia secondary to chemotherapy: currently transfusion independent. Will transfuse for hemoglobin < 8, platelets < 10,000, no premedications. Continue GCSF PRN for ANC < 1000.       Infectious Disease: Continue prophylaxis with valtrex, bactrim, and fluconazole.                                                                                                                           GI:   Continues on protonix for reflux precaution until PO intake improved. Ativan and Kytril available PRN for nausea.       Neuro: Insomnia improved. Will take melatonin PRN however has not been needing recently.      Derm: Rash: biopsy consistent with drug eruption. Now near resolved after switching form voriconazole to fluconazole.      Social: He can go see his grandmother and his dogs. He can also have visits with his friends.     Access: Stitches are out a bit from CVC insertion site. CXR obtained and confirmed tip placement appropriately in the low SVC.     Disposition: RTC 12/21 for labs and exam with Viky  Sally.    BENJA Ames-AC  [facility] Blood and Marrow Transplant  [address] [address]  [phone]  [phone]

## 2018-12-19 NOTE — PHARMACY
Outpatient TPN Monitoring    Artemio's TPN formula, labs, and nutritional status were reviewed today.  The following TPN changes are recommended:  increase volume and decrease magnesium.  Discussed with Jacqueline Fitzgerald NP and communicated to [facility].     Artemio will return to clinic on Friday 12/21 for labs and reassessment.      The following reflects the current TPN formula:  Dosing Weight:  65.1 kg  Volume:  1,600 mL (increased from 1,390 mL), cycled over 12 hours  Protein:  98 g/day   Dextrose:  255 g  Lipids:  240 mL      Sodium:  50 mEq/day  Potassium:  10 mEq/day  Calcium:  5 mEq/day  Magnesium:  35 mEq/day (decreased from 45 mEq/day)  Phosphorus:  zero mMol/day  Chloride:Acetate ratio:  1:2   Trace elements with Selenium:  standard  Multivitamins:  standard  Vitamin K:   2.5 mg/day      Pharmacy will continue to follow,  Estela Sotelo, AndreD, BCPS

## 2018-12-19 NOTE — PATIENT INSTRUCTIONS
Return to [facility] for labs and exam with Dr. Zavala on 12/21 at 10:00 labs 10:30 exam. Please hold Tacrolimus prior to visit for blood drug level, and take this medication after level obtained.    Infusion needs: none    Patient has PICC, Central line, CVC line, to be drawn off of per lab.     Medication changes: None    Care plan changes: None    Contact information  During business hours (7:30am-4:30pm):   To leave a non-urgent voicemail: call triage line [phone]    To call for time-sensitive needs or concerns : call clinic  [phone]    Evenings after 4:30pm, weekends, and holidays:   For any needs or concerns: call for BMT fellow at [phone] 911 in the case of an emergency    Thank you!     Patient is already scheduled for follow up with Dr Zavala on 12/21/2018 at 10:30am as of 12/20/218 at 8:50am [facility]

## 2018-12-19 NOTE — PROGRESS NOTES
This is a recent snapshot of the patient's [facility] medical record.  For current drug dose and complete information and questions, call [phone] or In Basket pool, [facility]v home infusion ([id number])  CSN Number:  [id number]

## 2018-12-19 NOTE — PROGRESS NOTES
"[facility]   PEDIATRIC BMT SOCIAL WORK PROGRESS NOTE  DATA:      met with Isael and his parents () Dangelo and Mervat during a clinic appointment for a brief supportive check in.   Mervat and Dangelo report some minor issues with caregiver schedule and miscommunication. Edwige was able to mediate conversation in an attempt to help resolve miscommunication. They have come to a tentative agreement going forward and will see how it goes. Dangelo and Camryn (step mom) will continue to provide the primary care giving since Isael lives at their house and Mervat will keep them apprised of her schedule on a weekly basis and let them know when she's free to come help. Mervat has indicated that she's very available and would like to come more but that Dangelo and Camryn never contact her to come help. Dangelo states he'd rather she contact them when she's free rather than wait around for a call or text. Mervat stated she could do this and requested that Dangelo or Camryn respond to her texts so she knows if she's allowed to come over or not on any given day. Dangelo stated either he or Camryn would eventually get back to her when she texts them.  Isael indicates that he is doing well, states that: \"some days I feel better than others but I feel pretty good today\". Isael stated he couldn't think of anything additional that he needed from  or team at the time of appointment.   Family did request that his PT appointments get moved to late morning instead of first thing in the morning as it was highly unlikely that he would make it to PT appointments when they were scheduled at 8 in the morning. This request was passed along to the POLLO.        INTERVENTION:     Supportive counseling  Family mediation     ASSESSMENT:      Mervat and Dangelo presented as calm but slightly annoyed with each other. Both seemed reasonably comfortable with the communication plan.   Isael seemed like he " was doing well. Engaged and pleasant. Did not endorse any questions or concerns. He seems to be coping well with transplant process at this time.     PLAN:    will provide ongoing psychosocial support to patient and family as needed.    REJI Barnes, [facility]    Pediatric Blood and Marrow Transplant  [phone]  [email or web address]      12/19/2018   1:35 PM       Copied from Chart Review:      PEDIATRIC BLOOD & MARROW TRANSPLANT SOCIAL WORK PSYCHOSOCIAL ASSESSMENT                         Date: 10/26/18          Assessment of living situation, support system, financial status, functional status, coping abilities/strategies, stressors, need for resources and other social work interventions.          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          Physician(s): Dr. Viky Zavala      Nurse Coordinator: Daria Jara          Presenting Information:       Artemio is a 18 year old male patient with Acute Lymphoblastic Leukemia (ALL) who is pursuing curative treatment through a bone marrow transplant. Artemio was admitted to the bone marrow transplant unit at the [facility] on [facility] Oct. 28th to begin his pre-transplant conditioning regimen. Artemio's older brother Link will be his matched sibling donor. Link is an adult and therefore he can provide his own consent for marrow donation procedure.           Special Considerations/Accomodations:       Patient's parents Dangelo and Mervat are  but amicable. They are  both involved in Artemio's care as sources of support. Dangelo is remarried to his wife Camryn and Mervat has a significant other named Jada.  explained to Dangelo and Mervat that the care team will be unable to update multiple family members on a daily basis and recommended they come up with a system to update each other. They decided to trial keeping a notebook in Artemio's room and whoever is there getting updates from the doctor on any given day can take notes and leave them there for the next person to read and update as necessary.               Contact/Legal Info:       Decision Maker(s): Artemio is an adult and able to make his own medical decisions at this time. He wants all medical decisions to go through him and not his parents.      Special Custody Considerations: N/A      Advance Directive: Artemio states he completed an advanced directive while undergoing cancer treatment at [facility]. He signed a release of information so his treatment team at [facility] can access a copy.       Permanent Address: [address] [place] [address] Lives with  paternal grandmother and paternal grandfather      Local Address: [id number] [place] [address]   Staying locally with father and step mother until he is greater than 100 days post BMT.      Phone number(s): Mom - Mervat: [phone]                                                       Dad - Dangelo: [phone]                                                       Step-Mom - Camryn: [phone]              Support System/Relationship Status/Family History: Artemio's parents and paternal grandparents are his primary support system. His parents, Mervat and Dangelo, are  and civil, but prefer to visit Artemio at different times and communicate directly with Artemio versus with each other as much as possible.  Dangelo is remarried and his wife's name is Camryn. Artemio has given verbal permission for his  parents and Camryn all to get any information they would like to know. Edwige explained to all of his parents that the medical team will not be able to update multiple family members per day and that they should try to update each other as much as possible.   Artemio also has two siblings, an older sister and brother. His sister Kamilah lives locally in [place], [place] is  and has one child. His brother Link lives in [place], where Mervat's family is from. Mervat had also relocated back to [place] to assist her parents as they aged but has come back to [place] to assist with the transplant process.   Patient previously had been living in his patnernal grandparents basement. There were some concerns from various family members that this environment was not suitable for a post BMT patient. (reasoning was that it was damp and musty and it was unknown if mold and mildew were present. Fortunately, Artemio decided that going back to live at his grandparents house during post BMT follow up was not a good idea for several reasons, mainly because his grandparents have their own health issues and he didn't want to stress them out by living at their house while also going through his own health issues..  Artemio states he does not have a significant other at this time.          Unique Patient/Family Needs:  Establishing effective communication pattern with patient and his parents.      Spirituality/Lottie Affiliation: Patient does not identify with lottie community        Communication Preferences: Artemio wants all medical communication and decisions to go through him. He prefers direct to the point communication. He does not like to be bothered with examinations and questions if they're not necessary.              Caregiver Plan: Parents, Artemio's primary care givers will be Mervat Pierson and step-mom Camryn. He will be staying at his dad and step-mom's house after discharge.  continuing to  "work with Artemio's parents on this plan. Initially it was reported by other family members that Dangelo preferred not to have Mervat at their house when it was her turn to provide care giving duties and that same individual reported that Mervat preferred not to be at Dangelo and Camryn's house. The other most likely option would be for Isael to go back and forth from Dangelo and Camryn's house to Mervat's apartment. Sofier advised them to come up with a plan based on what was best for Artemio, what his preferences are, and what their family can manage.       Patient & Caregiver Knowledge of Medical Condition and Plan of Care: Artemio and his family are understanding of his medical condition and plan of care for BMT.      Patient and Caregiver Transplant Goals: Artemio and his parents state they \"Just want to get through it\".              Patient Personality/Communication/Coping/Interests/Activities: approachable, withdrawn and quiet. Patient states that when he doesn't feel good he irish by playing video games, watching movies or TV and sleeping. He also states he's not a morning person and prefers not to be bothered at night or if he's napping. He requested nursing bundle cares when possible. Edwige explained that the nurses try to be as accommodating as possible to patient preferences but it is not always possible due to the large number tasks they need to complete every day with every patient.      Patient Education/Developmental Level: Patient graduated from highschool approximately a year ago and had been taking some time off before deciding about college. He plans to revisit applying to higher education once he is done with the BMT process.              Logistical Considerations:  Transportation: Private Car  Parking: at this time parents state they can afford to buy monthly parking passes.  Housing: patient and family is local.  Mervat has rented a short term apartment in [place] until Artemio is through " transplant and she can return to [place].          Financial: Patient and his parents state that at this time they are able to meet their expenses with their current income. Because he essentially has 3 parents to share the care giving work, they all plan to continue working and therefore are hoping to maintain their current income.      Insurance:   Primary: [employer] of [place], Camryn carries this insurance.  Secondary: [facility]  Unique Issues?:       Patient/Caregiver Sources of Income/Employment: Patient is not employed at this time due to his frequent treatment needs related to his ALL.   Patient's mom, Mervat, is a tele nurse and has started a short term part time casual position at [employer] in [place]. Patient's Dad, Dangelo, owns his own commercial painting business. Since he's the owner he has some flexibility in his schedule and can be away from work regularly. Patient's step mom, Camryn, is a nurse and works at one of the [employer] in the [facility] [facility].  Financial Concerns: none at this time. Artemio and his parents were encouraged to reach out if they had any financial concerns in the future.              Patient/Family Psychosocial Considerations:      Mental Health: No mental health issues identified         Chemical Health: No issues identified        Trauma/Loss/Abuse History: No identified issues        Legal Issues: No issues identified              Clinical Assessment and Recommendations:       Patient and family present as well-informed about and prepared for the treatment process. I did not identify any significant barriers to them managing the demands of treatment.          Concerns: none identified at this time.      Education Provided: Transplant process expectations, Caregiver requirements, Caregiver self-care, Financial issues related to transplant, Financial resources/grants available, Common psychosocial stressors pre/post transplant, Hopsital resources  available and Social work role        Interventions Provided:   Education and counseling related to psychosocial issues and resources      Follow up planned:  Psychosocial support      Electronically signed by Lance Weber LICSW at 11/23/2018 12:55 PM

## 2018-12-19 NOTE — NURSING NOTE
"Chief Complaint   Patient presents with     RECHECK     Patient is here today for ALL follow up     /82 (BP Location: Right arm, Patient Position: Fowlers, Cuff Size: Adult Regular)   Pulse 92   Temp 98.9  F (37.2  C) (Oral)   Resp 20   Ht 1.753 m (5' 9.02\")   Wt 62.6 kg (138 lb 0.1 oz)   SpO2 99%   BMI 20.37 kg/m      Donna Andrews LPN  December 19, 2018  "

## 2018-12-20 NOTE — PROGRESS NOTES
This is a recent snapshot of the patient's [facility] medical record.  For current drug dose and complete information and questions, call [phone] or In Basket pool, [facility] ([id number])  CSN Number:  [id number]

## 2018-12-20 NOTE — TELEPHONE ENCOUNTER
Re: Tacrolimus TDM    D- tacro 0.6 mg BID; goal 5-10  Today 3.2 (12hrs post)    - Recent history  12/14 4.6 -> increased from 0.5 BID to 0.6 BID  12/11 6.0 -> unchanged    DI:  Vori switched to fluconazole on 12/11    A: Level low today likely due to switching from voriconazole to fluconazole. Will increase the dose.    P: Increased from 0.6 BID to 1.0 BID    Stem mother was called but did not answer. So, the above information was emailed to all of the 4 persons in the contact.    Rosa Abdullahi MD  Fellow, Peds Hem/Onc

## 2018-12-21 ENCOUNTER — TELEPHONE (OUTPATIENT)
Dept: ONCOLOGY | Facility: CLINIC | Age: 18
End: 2018-12-21

## 2018-12-21 ENCOUNTER — ONCOLOGY VISIT (OUTPATIENT)
Dept: TRANSPLANT | Facility: CLINIC | Age: 18
End: 2018-12-21
Attending: PEDIATRICS
Payer: COMMERCIAL

## 2018-12-21 ENCOUNTER — HOME INFUSION (PRE-WILLOW HOME INFUSION) (OUTPATIENT)
Dept: PHARMACY | Facility: CLINIC | Age: 18
End: 2018-12-21

## 2018-12-21 VITALS
HEART RATE: 97 BPM | DIASTOLIC BLOOD PRESSURE: 76 MMHG | TEMPERATURE: 99.3 F | SYSTOLIC BLOOD PRESSURE: 99 MMHG | OXYGEN SATURATION: 98 % | RESPIRATION RATE: 12 BRPM

## 2018-12-21 DIAGNOSIS — Z94.81 S/P ALLOGENEIC BONE MARROW TRANSPLANT (H): ICD-10-CM

## 2018-12-21 DIAGNOSIS — Z91.89 AT RISK FOR INFECTION: ICD-10-CM

## 2018-12-21 DIAGNOSIS — R11.0 NAUSEA: ICD-10-CM

## 2018-12-21 DIAGNOSIS — R63.0 ANOREXIA: ICD-10-CM

## 2018-12-21 DIAGNOSIS — Z91.89 AT RISK FOR GRAFT VERSUS HOST DISEASE: ICD-10-CM

## 2018-12-21 DIAGNOSIS — Z78.9 ON TOTAL PARENTERAL NUTRITION: ICD-10-CM

## 2018-12-21 DIAGNOSIS — C91.01 ACUTE LYMPHOBLASTIC LEUKEMIA (ALL) IN REMISSION (H): Primary | ICD-10-CM

## 2018-12-21 LAB
ALBUMIN SERPL-MCNC: 4 G/DL (ref 3.4–5)
ALP SERPL-CCNC: 74 U/L (ref 40–150)
ALT SERPL W P-5'-P-CCNC: 72 U/L (ref 0–70)
ANION GAP SERPL CALCULATED.3IONS-SCNC: 6 MMOL/L (ref 3–14)
AST SERPL W P-5'-P-CCNC: 40 U/L (ref 0–45)
BASOPHILS # BLD AUTO: 0.1 10E9/L (ref 0–0.2)
BASOPHILS NFR BLD AUTO: 0.6 %
BILIRUB SERPL-MCNC: 0.7 MG/DL (ref 0.2–1.3)
BUN SERPL-MCNC: 15 MG/DL (ref 7–30)
CALCIUM SERPL-MCNC: 9.2 MG/DL (ref 8.5–10.1)
CHLORIDE SERPL-SCNC: 104 MMOL/L (ref 94–109)
CO2 SERPL-SCNC: 24 MMOL/L (ref 20–32)
CREAT SERPL-MCNC: 0.76 MG/DL (ref 0.66–1.25)
DIFFERENTIAL METHOD BLD: ABNORMAL
EOSINOPHIL # BLD AUTO: 0.2 10E9/L (ref 0–0.7)
EOSINOPHIL NFR BLD AUTO: 1.5 %
ERYTHROCYTE [DISTWIDTH] IN BLOOD BY AUTOMATED COUNT: 19.7 % (ref 10–15)
GFR SERPL CREATININE-BSD FRML MDRD: >90 ML/MIN/{1.73_M2}
GLUCOSE SERPL-MCNC: 105 MG/DL (ref 70–99)
HCT VFR BLD AUTO: 34.6 % (ref 40–53)
HGB BLD-MCNC: 11.8 G/DL (ref 13.3–17.7)
IMM GRANULOCYTES # BLD: 0.1 10E9/L (ref 0–0.4)
IMM GRANULOCYTES NFR BLD: 1 %
LDH SERPL L TO P-CCNC: 249 U/L (ref 85–227)
LYMPHOCYTES # BLD AUTO: 0.9 10E9/L (ref 0.8–5.3)
LYMPHOCYTES NFR BLD AUTO: 8.2 %
MAGNESIUM SERPL-MCNC: 1.8 MG/DL (ref 1.6–2.3)
MCH RBC QN AUTO: 32.9 PG (ref 26.5–33)
MCHC RBC AUTO-ENTMCNC: 34.1 G/DL (ref 31.5–36.5)
MCV RBC AUTO: 96 FL (ref 78–100)
MONOCYTES # BLD AUTO: 1.1 10E9/L (ref 0–1.3)
MONOCYTES NFR BLD AUTO: 10.1 %
NEUTROPHILS # BLD AUTO: 8.7 10E9/L (ref 1.6–8.3)
NEUTROPHILS NFR BLD AUTO: 78.6 %
NRBC # BLD AUTO: 0 10*3/UL
NRBC BLD AUTO-RTO: 0 /100
PHOSPHATE SERPL-MCNC: 4.5 MG/DL (ref 2.5–4.5)
PLATELET # BLD AUTO: 189 10E9/L (ref 150–450)
POTASSIUM SERPL-SCNC: 4.4 MMOL/L (ref 3.4–5.3)
PROT SERPL-MCNC: 7.1 G/DL (ref 6.8–8.8)
RBC # BLD AUTO: 3.59 10E12/L (ref 4.4–5.9)
SODIUM SERPL-SCNC: 134 MMOL/L (ref 133–144)
TACROLIMUS BLD-MCNC: <3 UG/L (ref 5–15)
TME LAST DOSE: ABNORMAL H
WBC # BLD AUTO: 11 10E9/L (ref 4–11)

## 2018-12-21 PROCEDURE — 36592 COLLECT BLOOD FROM PICC: CPT | Performed by: NURSE PRACTITIONER

## 2018-12-21 PROCEDURE — 80197 ASSAY OF TACROLIMUS: CPT | Performed by: NURSE PRACTITIONER

## 2018-12-21 PROCEDURE — G0463 HOSPITAL OUTPT CLINIC VISIT: HCPCS | Mod: ZF

## 2018-12-21 PROCEDURE — 84100 ASSAY OF PHOSPHORUS: CPT | Performed by: NURSE PRACTITIONER

## 2018-12-21 PROCEDURE — 80053 COMPREHEN METABOLIC PANEL: CPT | Performed by: NURSE PRACTITIONER

## 2018-12-21 PROCEDURE — 83615 LACTATE (LD) (LDH) ENZYME: CPT | Performed by: NURSE PRACTITIONER

## 2018-12-21 PROCEDURE — 83735 ASSAY OF MAGNESIUM: CPT | Performed by: NURSE PRACTITIONER

## 2018-12-21 PROCEDURE — 85025 COMPLETE CBC W/AUTO DIFF WBC: CPT | Performed by: NURSE PRACTITIONER

## 2018-12-21 RX ORDER — FLUCONAZOLE 200 MG/1
200 TABLET ORAL DAILY
Qty: 30 TABLET | Refills: 3 | Status: SHIPPED | OUTPATIENT
Start: 2018-12-21 | End: 2019-02-15

## 2018-12-21 RX ORDER — DRONABINOL 2.5 MG/1
2.5 CAPSULE ORAL 2 TIMES DAILY
Qty: 60 CAPSULE | Refills: 0 | Status: SHIPPED | OUTPATIENT
Start: 2018-12-21 | End: 2018-12-26

## 2018-12-21 RX ORDER — VALACYCLOVIR HYDROCHLORIDE 1 G/1
1000 TABLET, FILM COATED ORAL 3 TIMES DAILY
Qty: 90 TABLET | Refills: 3 | Status: SHIPPED | OUTPATIENT
Start: 2018-12-21 | End: 2019-02-15

## 2018-12-21 RX ORDER — PANTOPRAZOLE SODIUM 40 MG/1
40 TABLET, DELAYED RELEASE ORAL 2 TIMES DAILY
Qty: 30 TABLET | Refills: 3 | Status: SHIPPED | OUTPATIENT
Start: 2018-12-21 | End: 2019-02-08

## 2018-12-21 NOTE — PATIENT INSTRUCTIONS
Return to [facility] for labs and exam with an POLLO on Monday, December 24th (to be scheduled) and for labs and exam with Dr. Viky Zavala on Friday, December 28th (already scheduled). Please hold tacrolimus prior to visit for blood drug level, and take this medication after level obtained.  Infusion needs: none  Patient has central line to be drawn off of per lab.   Medication changes: take Kytril in night to help with nausea/vomiting  Care plan changes: begin recording intake  Contact information  During business hours (7:30am-4:30pm):   To leave a non-urgent voicemail: call triage line [phone]    To call for time-sensitive needs or concerns : call clinic  [phone]  Evenings after 4:30pm, weekends, and holidays:   For any needs or concerns: call for BMT fellow at [phone] in the case of an emergency  Thank you!   Patient is already scheduled for follow up on 12/24/2018 with Lilia López at 11:00am and on 12/28/2018 with Dr Zavala at 10:00am as of 12/24/2018 at 8:52am [facility]

## 2018-12-21 NOTE — PHARMACY
Outpatient TPN Monitoring     Artemio's TPN formula, labs, and nutritional status were reviewed today.  The following TPN changes are recommended:  increase sodium  Discussed with Viky Zavala MD and communicated to [facility].     Artemio will return to clinic on Friday 12/28 for labs and reassessment.      The following reflects the current TPN formula:  Dosing Weight:  65.1 kg  Volume:  1,600 mL, cycled over 12 hours  Protein:  98 g/day   Dextrose:  255 g  Lipids:  240 mL      Sodium:  60 mEq/day (increased from 50 mEq/day)  Potassium:  10 mEq/day  Calcium:  5 mEq/day  Magnesium:  35 mEq/day   Phosphorus:  zero mMol/day  Chloride:Acetate ratio:  1:2   Trace elements with Selenium:  standard  Multivitamins:  standard  Vitamin K:   2.5 mg/day      Pharmacy will continue to follow    Julianne Chisholm, PharmD, MS  PGY2 Pediatric Pharmacy Resident

## 2018-12-21 NOTE — PROGRESS NOTES
December 21, 2018    Dear Dr. Paniagua,     We had the pleasure of seeing Artemio Nino in our Pediatric Blood and Marrow Transplant Clinic today. 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 He is here today with his father for follow up care and labs. Of note he was sitting with a vomit bag because he vomited this am.      Isael has thrown up a few times this week.  It is typically only once or twice and is the am.  He sometimes vomits his medications. He states that it will wake him up from sleep.  But then he feels better most of the day. He was happy to see his dog.  He has been taking his meds.  He states that his urinalysis will be unhelpful as he has not drank anything today.  He also has diarrhea- although he states he has it some mornings and it typically only happens once.  He was bummed because he had planned to see his friends but then was not feeling well.  He is eating a little more.  But only around 50%.  He has some pressure in his forehead, but no runny nose or cough.  The rash has also gone.     Review of Systems: Pertinent positives include those mentioned in interval events. A complete review of systems was performed and is otherwise negative.       Family History: unchanged from previous visit    Social History: unchanged from previous visit    Medications:  Please see MAR     Physical Exam:  BP 99/76 (BP Location: Left arm, Patient Position: Chair, Cuff Size: Adult Regular)   Pulse 97   Temp 99.3  F (37.4  C) (Oral)   Resp 12   SpO2 98%   FEN: Sitting on exam table. Awake and alert and in no apparent distress- although sitting with a puke bag. Father present.   HEENT: Alopecia, normocephalic, atraumatic, sclera clear, MMM, oral lesions essentially resolved, nares patent without discharge    CARD: Heart regular rate and rhythm. No murmurs, rubs or gallops. Cap refill 2 seconds.  RESP: Lungs clear in upper  lobes, diminished in bases, shallow respirations.  No increased work of breathing, crackles or wheezes.   ABD: Non-distended, non-tender, no organomegaly  EXTREM: No edema noted  SKIN: Rash resolved    Labs:    Results for orders placed or performed in visit on 12/21/18   Lactate Dehydrogenase   Result Value Ref Range    Lactate Dehydrogenase 249 (H) 85 - 227 U/L   Phosphorus   Result Value Ref Range    Phosphorus 4.5 2.5 - 4.5 mg/dL   Magnesium   Result Value Ref Range    Magnesium 1.8 1.6 - 2.3 mg/dL   CBC with platelets differential   Result Value Ref Range    WBC 11.0 4.0 - 11.0 10e9/L    RBC Count 3.59 (L) 4.4 - 5.9 10e12/L    Hemoglobin 11.8 (L) 13.3 - 17.7 g/dL    Hematocrit 34.6 (L) 40.0 - 53.0 %    MCV 96 78 - 100 fl    MCH 32.9 26.5 - 33.0 pg    MCHC 34.1 31.5 - 36.5 g/dL    RDW 19.7 (H) 10.0 - 15.0 %    Platelet Count 189 150 - 450 10e9/L    Diff Method Automated Method     % Neutrophils 78.6 %    % Lymphocytes 8.2 %    % Monocytes 10.1 %    % Eosinophils 1.5 %    % Basophils 0.6 %    % Immature Granulocytes 1.0 %    Nucleated RBCs 0 0 /100    Absolute Neutrophil 8.7 (H) 1.6 - 8.3 10e9/L    Absolute Lymphocytes 0.9 0.8 - 5.3 10e9/L    Absolute Monocytes 1.1 0.0 - 1.3 10e9/L    Absolute Eosinophils 0.2 0.0 - 0.7 10e9/L    Absolute Basophils 0.1 0.0 - 0.2 10e9/L    Abs Immature Granulocytes 0.1 0 - 0.4 10e9/L    Absolute Nucleated RBC 0.0    Comprehensive metabolic panel   Result Value Ref Range    Sodium 134 133 - 144 mmol/L    Potassium 4.4 3.4 - 5.3 mmol/L    Chloride 104 94 - 109 mmol/L    Carbon Dioxide 24 20 - 32 mmol/L    Anion Gap 6 3 - 14 mmol/L    Glucose 105 (H) 70 - 99 mg/dL    Urea Nitrogen 15 7 - 30 mg/dL    Creatinine 0.76 0.66 - 1.25 mg/dL    GFR Estimate >90 >60 mL/min/[1.73_m2]    GFR Estimate If Black >90 >60 mL/min/[1.73_m2]    Calcium 9.2 8.5 - 10.1 mg/dL    Bilirubin Total 0.7 0.2 - 1.3 mg/dL    Albumin 4.0 3.4 - 5.0 g/dL    Protein Total 7.1 6.8 - 8.8 g/dL    Alkaline Phosphatase 74  40 - 150 U/L    ALT 72 (H) 0 - 70 U/L    AST 40 0 - 45 U/L     Pending: CMV       Assessment/Plan:  Artemio is a 18 year old male with 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 Post transplant complications include C. diff diarrhea (resolved), rash secondary to voriconazole which has resolved, and poor oral nutrition (improving, on TPN/IL).     BMT:   High risk B cell ALL (CNS negative) + JAK2 activation/BMT: bone marrow biopsy prior to transplant (10/15) MRD 0.006%.   Conditioning per 2015-29: TBI days -4 through -1 followed by matched sibling donor transplant on 11/2   Engrafted day +15, PB VNTR 100% donor, unseparated Day +28 (11/27) bone marrow biopsy, 20-30% donor with trilineage hematopoesis, flow & FISH negative, 100% donor.  Chromosomes negative.      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   Continue tacrolimus. Tacrolimus level was low this week.  Increased the dose to 1 ml BID.  Level pending toay.      FEN/Renal:  Risk for malnutrition: continue TPN/IL with increased volume due to creatinine, cycled over 12 hours. Improved amount of PO intake. Family will write down what he is eating the next week. Continue marinol BID for appetite stimulation and appreciate dietician recs.  Electrolytes stable.  Currently no evidence of TMA- continue to monitor weekly.  I am hoping that the TPN will stop in the next couple weeks.      Pulmonary:  Pneumomediastinum: finding upon work up Chest CT (10/22), asymptomatic. Per radiology, likely benign, perhaps transient. No issues.      Cardiovascular: Aaron-Parkinson-White Syndrome: diagnosed upon syncope in 02/2018. Asymptomatic, no interventions to date. Work up EKG c/w WPW, sinus bradycardia at 49 bpm. Work up ECHO normal with EF 68%.     Heme:  Pancytopenia secondary to chemotherapy:   - Transfuse for hemoglobin < 8, platelets < 10,000, no premedications  - GCSF PRN for ANC < 1000      Infectious Disease: Continue prophylaxis with valtrex,  bactrim, and fluconazole.                                                                                                                           GI:   Reflux precaution: Continue protonix until PO intake improved. Nausea management:due to increased nausea will go back to scheduled kytril to see if it helps.  Also has diarrhea- will monitor for now.  He has a history of C. Diff so if needed will test for it.       Neuro: Insomnia-continue melatonin at night as needed.     Derm: Rash: secondary to voriconazole- and resolved.      Social-He can go out at low-traffic times.       Disposition: RTC 12/24 for labs and exam with POLLO to make sure his vomiting has improved and that it does not worsen.      Sincerely,     Viky Zavala MD, PhD    Pediatric Blood and Marrow Transplant  [facility]

## 2018-12-21 NOTE — NURSING NOTE
Chief Complaint   Patient presents with     RECHECK     Patient is here today for a follow up regarding ALL     BP 99/76 (BP Location: Left arm, Patient Position: Chair, Cuff Size: Adult Regular)   Pulse 97   Temp 99.3  F (37.4  C) (Oral)   Resp 12   SpO2 98%     Isabel Gonsalez CMA   December 21, 2018

## 2018-12-22 ENCOUNTER — TELEPHONE (OUTPATIENT)
Dept: ONCOLOGY | Facility: CLINIC | Age: 18
End: 2018-12-22

## 2018-12-22 DIAGNOSIS — Z94.81 S/P ALLOGENEIC BONE MARROW TRANSPLANT (H): ICD-10-CM

## 2018-12-22 DIAGNOSIS — Z91.89 AT RISK FOR GRAFT VERSUS HOST DISEASE: ICD-10-CM

## 2018-12-22 NOTE — TELEPHONE ENCOUNTER
Re: Tacrolimus refill    Got a call from [facility]charge pharmacy that the family presented there and asking for refills.     Refill entered.     Rosa Abdullahi MD  Fellow, Peds Hem/Onc

## 2018-12-22 NOTE — PHARMACY-VANCOMYCIN DOSING SERVICE
Tacrolimus Monitoring Note    Current dose = 1 mg PO Q12H  Tacrolimus level <3    Goal trough level = 5-10 mcg/L.      Current trough level is below the desired range.      The patient is currently receiving medications that can significantly interact with Tacrolimus, and they are: fluconazole (recently switched from voriconazole to fluconazole.    Plan: Change regimen to 1.3 mg PO Q12H    Recheck trough level in 2-3 days.  Pharmacy Team will continue to follow.    Francisca Culp, PharmD

## 2018-12-22 NOTE — TELEPHONE ENCOUNTER
Re: Tacrolimus TDM     D- tacro 1.0 mg BID; goal 5-10  Today <3 (12hrs post)     - Recent history  12/19 3.2 (12hrs post) -> increased from 0.6 BID to 1.0 BID  12/14 4.6 -> increased from 0.5 BID to 0.6 BID  12/11 6.0 -> unchanged     DI:  Vori switched to fluconazole on 12/11     A:   Today's level remains low. Possibly due to switching from voriconazole to fluconazole. Will continue to increase the dose.     P: Increased from 1.0 BID to 1.3 BID     Stem mother was called but did not answer. So, the above information was emailed to all of the 4 persons in the contact.     Rosa Abdullahi MD  Fellow, Peds Hem/Onc

## 2018-12-24 ENCOUNTER — ONCOLOGY VISIT (OUTPATIENT)
Dept: TRANSPLANT | Facility: CLINIC | Age: 18
End: 2018-12-24
Attending: NURSE PRACTITIONER
Payer: COMMERCIAL

## 2018-12-24 ENCOUNTER — HOME INFUSION (PRE-WILLOW HOME INFUSION) (OUTPATIENT)
Dept: PHARMACY | Facility: CLINIC | Age: 18
End: 2018-12-24

## 2018-12-24 VITALS
OXYGEN SATURATION: 98 % | WEIGHT: 136.47 LBS | BODY MASS INDEX: 20.21 KG/M2 | TEMPERATURE: 98.5 F | SYSTOLIC BLOOD PRESSURE: 104 MMHG | RESPIRATION RATE: 16 BRPM | HEIGHT: 69 IN | HEART RATE: 101 BPM | DIASTOLIC BLOOD PRESSURE: 71 MMHG

## 2018-12-24 DIAGNOSIS — C91.01 ACUTE LYMPHOBLASTIC LEUKEMIA (ALL) IN REMISSION (H): ICD-10-CM

## 2018-12-24 DIAGNOSIS — Z94.81 S/P ALLOGENEIC BONE MARROW TRANSPLANT (H): ICD-10-CM

## 2018-12-24 LAB
ALBUMIN SERPL-MCNC: 4 G/DL (ref 3.4–5)
ALP SERPL-CCNC: 65 U/L (ref 40–150)
ALT SERPL W P-5'-P-CCNC: 97 U/L (ref 0–70)
ANION GAP SERPL CALCULATED.3IONS-SCNC: 6 MMOL/L (ref 3–14)
AST SERPL W P-5'-P-CCNC: 51 U/L (ref 0–45)
BASOPHILS # BLD AUTO: 0.1 10E9/L (ref 0–0.2)
BASOPHILS NFR BLD AUTO: 0.7 %
BILIRUB SERPL-MCNC: 0.8 MG/DL (ref 0.2–1.3)
BUN SERPL-MCNC: 20 MG/DL (ref 7–30)
CALCIUM SERPL-MCNC: 8.8 MG/DL (ref 8.5–10.1)
CHLORIDE SERPL-SCNC: 106 MMOL/L (ref 94–109)
CO2 SERPL-SCNC: 26 MMOL/L (ref 20–32)
CREAT SERPL-MCNC: 0.85 MG/DL (ref 0.66–1.25)
DIFFERENTIAL METHOD BLD: ABNORMAL
EOSINOPHIL # BLD AUTO: 0.1 10E9/L (ref 0–0.7)
EOSINOPHIL NFR BLD AUTO: 1.5 %
ERYTHROCYTE [DISTWIDTH] IN BLOOD BY AUTOMATED COUNT: ABNORMAL % (ref 10–15)
GFR SERPL CREATININE-BSD FRML MDRD: >90 ML/MIN/{1.73_M2}
GLUCOSE SERPL-MCNC: 111 MG/DL (ref 70–99)
HCT VFR BLD AUTO: 34.1 % (ref 40–53)
HGB BLD-MCNC: 11.3 G/DL (ref 13.3–17.7)
IMM GRANULOCYTES # BLD: 0.1 10E9/L (ref 0–0.4)
IMM GRANULOCYTES NFR BLD: 0.7 %
LYMPHOCYTES # BLD AUTO: 0.7 10E9/L (ref 0.8–5.3)
LYMPHOCYTES NFR BLD AUTO: 9.1 %
MAGNESIUM SERPL-MCNC: 2.1 MG/DL (ref 1.6–2.3)
MCH RBC QN AUTO: 32.8 PG (ref 26.5–33)
MCHC RBC AUTO-ENTMCNC: 33.1 G/DL (ref 31.5–36.5)
MCV RBC AUTO: 99 FL (ref 78–100)
MONOCYTES # BLD AUTO: 0.9 10E9/L (ref 0–1.3)
MONOCYTES NFR BLD AUTO: 12 %
NEUTROPHILS # BLD AUTO: 5.6 10E9/L (ref 1.6–8.3)
NEUTROPHILS NFR BLD AUTO: 76 %
NRBC # BLD AUTO: 0 10*3/UL
NRBC BLD AUTO-RTO: 0 /100
PHOSPHATE SERPL-MCNC: 4.2 MG/DL (ref 2.5–4.5)
PLATELET # BLD AUTO: 175 10E9/L (ref 150–450)
POTASSIUM SERPL-SCNC: 4.2 MMOL/L (ref 3.4–5.3)
PROT SERPL-MCNC: 7.1 G/DL (ref 6.8–8.8)
RBC # BLD AUTO: 3.44 10E12/L (ref 4.4–5.9)
SODIUM SERPL-SCNC: 138 MMOL/L (ref 133–144)
WBC # BLD AUTO: 7.4 10E9/L (ref 4–11)

## 2018-12-24 PROCEDURE — 80053 COMPREHEN METABOLIC PANEL: CPT | Performed by: PEDIATRICS

## 2018-12-24 PROCEDURE — 85025 COMPLETE CBC W/AUTO DIFF WBC: CPT | Performed by: PEDIATRICS

## 2018-12-24 PROCEDURE — 84100 ASSAY OF PHOSPHORUS: CPT | Performed by: PEDIATRICS

## 2018-12-24 PROCEDURE — 83735 ASSAY OF MAGNESIUM: CPT | Performed by: PEDIATRICS

## 2018-12-24 PROCEDURE — 80197 ASSAY OF TACROLIMUS: CPT | Performed by: PEDIATRICS

## 2018-12-24 PROCEDURE — 36592 COLLECT BLOOD FROM PICC: CPT | Performed by: PEDIATRICS

## 2018-12-24 PROCEDURE — G0463 HOSPITAL OUTPT CLINIC VISIT: HCPCS | Mod: ZF

## 2018-12-24 ASSESSMENT — PAIN SCALES - GENERAL: PAINLEVEL: NO PAIN (0)

## 2018-12-24 ASSESSMENT — MIFFLIN-ST. JEOR: SCORE: 1617.76

## 2018-12-24 NOTE — PROGRESS NOTES
Outpatient BMT Progress Note  Date of Service: 12/24/18    Interval History: Artemio returns to clinic today, day +52 s/p BMT, for labs and exam. Artemio reports last week signficant nausea/vomiting and diarrhea. At this time Kytril BID scheduled was restarted, unfortunately his oral intake despite his focus on nutrition has remain low resulting in ongoing weight loss today of 1kg. Reports he continues to be able to drink water. He reports food currently is  overall unappealing and despite Marinol for appetite stimulation his appetite is very low. Previous rash is completely resolved since the discontinuation of Voriconazole. Complains of morning abdominal discomfort under his ribs which is reports feels like and empty stomach, he is without cramping, his is without loose stool. The discomfort is transient, Artemio feels the discomfort improves during the day. He also reports on small area of discomfort in his throat, his lips and OP are moist without erythema. No recent sick contacts, no URI symptoms, without any constipation or diarrhea. His affect is bright, he is engaged in ROS and presented with a  smile. He reports sleeping well waking up only because of TPN infusing increasing his need to use the bathroom.     Review of Systems: Pertinent positives include those mentioned in interval events. A complete review of systems was performed and is otherwise negative.      Medications:  Please see MAR     Physical Exam:    FEN: Sitting on exam table. Awake and alert and without distress.  Step mother is  present.   HEENT: Alopecia, normocephalic, atraumatic, sclera clear, MMM, oral lesions essentially resolved, nares patent without discharge    CARD: Heart regular rate and rhythm. No murmurs, rubs or gallops. Cap refill 2 seconds.  RESP: Lungs clear in upper lobes, diminished in bases, shallow respirations.  No increased work of breathing, crackles or wheezes.   ABD: Non-distended, non-tender, no  organomegaly  EXTREM: No edema noted  SKIN: Pale with resolution of confluent erythema/rash     Labs:    CBC with Hgb 11.3, WBC 7.4, ANC 5.6, platelets 175. 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 Post transplant complications include C. diff diarrhea (resolved), rash secondary to voriconazole (resolved), and poor oral nutrition (improving) for which he continues on TPN/IL. Monitoring hydration and renal status carefully due continue decreased intake of fluids s/p increase in nausea and diarrhea. Both nausea and diarrhea are resolved but per Artemio but he has no interest in solids but continues to attempt eating food. Artemio reports his fluid intake has and continues to improve improved. Ongoing weight loss now ~1kg, no changes to TPN/lipids per dietician.     BMT:   High risk B cell ALL (CNS negative) + JAK2 activation/BMT: bone marrow biopsy prior to transplant (10/15) MRD 0.006%.   Conditioning per 2015-29: TBI days -4 through -1 followed by matched sibling donor transplant on 11/2   Engrafted day +15, PB VNTR 100% donor, unseparated Day +28 (11/27) bone marrow biopsy, 20-30% donor with trilineage hematopoesis, flow & FISH negative, 100% donor.  Chromosomes negative. Next donor evaluations due day +60.       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 Continue tacrolimus with goal 5-10. Level pending from today.      FEN/Renal:  Risk for malnutrition: continue TPN/IL with increased volume due to creatinine, cycled over 12 hours. Improved amount of PO intake. Family will write down what he is eating the next week. Continue marinol BID for appetite stimulation and appreciate dietician recs.  Electrolytes stable.  Currently no evidence of TMA- continue to monitor weekly. Hopeful that TPN will stop in the next couple weeks.      Pulmonary:  Pneumomediastinum: finding upon  work up Chest CT (10/22), asymptomatic. Per radiology, likely benign, perhaps transient. No issues.      Cardiovascular: Aaron-Parkinson-White Syndrome: diagnosed upon syncope in 02/2018. Asymptomatic, no interventions to date. Work up EKG c/w WPW, sinus bradycardia at 49 bpm. Work up ECHO normal with EF 68%.     Heme:  Pancytopenia secondary to chemotherapy: transfusion independent  - Transfuse for hemoglobin < 8, platelets < 10,000, no premedications  - GCSF PRN for ANC < 1000      Infectious Disease: Continue prophylaxis with valtrex, bactrim, and fluconazole. Weekly CMV PCR non-detectable 12/21.                                                                                                                           GI:     - Reflux precaution: Continue protonix until PO intake improved  - Nausea management:  Continue Kytril BID continues scheduled   - Diarrhea: Resolved       Neuro: Insomnia-continue melatonin  at night as needed.     Derm: Rash: secondary to voriconazole, resolved upon discontinuation.      Social: He can go out at low-traffic times.     Disposition: RTC 12/26 for labs, then 12/28 for labs and exam with Dr. Zavala.     Lilia Prabhakar MSN, CPNP-AC  Pediatric Blood and Marrow Transplant Program  [facility] [phone]  Pager [phone]    Patient Active Problem List   Diagnosis     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)     Aaron-Parkinson-White (WPW) syndrome     Bone marrow transplant candidate     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)     At risk for infection     S/P allogeneic bone marrow transplant (H)

## 2018-12-24 NOTE — NURSING NOTE
"Chief Complaint   Patient presents with     RECHECK     Patient is here today for a follow up regarding ALL     /71 (BP Location: Left arm, Patient Position: Chair, Cuff Size: Adult Regular)   Pulse 101   Temp 98.5  F (36.9  C) (Oral)   Resp 16   Ht 1.75 m (5' 8.9\")   Wt 61.9 kg (136 lb 7.4 oz)   SpO2 98%   BMI 20.21 kg/m      Isabel Gonsalez, [facility]   December 24, 2018    "

## 2018-12-24 NOTE — PATIENT INSTRUCTIONS
Return to [facility] for labs and exam with 12/26 for follow up appointment with Jacqueline YOUSIF at 12:30 for labs and 12:45 for appt   Please scheduled Monday12/31 , Wednesday 1/2 Friday 1/4 for labs and follow needs am appt for drug levels   Infusion needs: none     Patient has PICC, Central line, CVC line, to be drawn off of per lab. He also has a port     Medication changes: none - Tacro level pending     Contact information  During business hours (7:30am-4:30pm):   To leave a non-urgent voicemail: call triage line [phone]    To call for time-sensitive needs or concerns : call clinic  [phone]    Evenings after 4:30pm, weekends, and holidays:   For any needs or concerns: call for BMT fellow at [phone] in the case of an emergency    Thank you!       Lilia OATES, BENJA-  Pediatric Blood and Marrow Transplant Program  [facility] [phone]  Pager [phone]  Spoke with Jacqueline Fitzgerald today ... Artemio already scheduled on 12/28/2018 for labs at 9:3oam and exam with Dr. Zavala at 10:00am as of 12/26/2018 at 1:25pm [facility]

## 2018-12-25 LAB
TACROLIMUS BLD-MCNC: 6.6 UG/L (ref 5–15)
TME LAST DOSE: NORMAL H

## 2018-12-26 ENCOUNTER — HOME INFUSION (PRE-WILLOW HOME INFUSION) (OUTPATIENT)
Dept: PHARMACY | Facility: CLINIC | Age: 18
End: 2018-12-26

## 2018-12-26 ENCOUNTER — ONCOLOGY VISIT (OUTPATIENT)
Dept: TRANSPLANT | Facility: CLINIC | Age: 18
End: 2018-12-26
Attending: PEDIATRICS
Payer: COMMERCIAL

## 2018-12-26 VITALS
OXYGEN SATURATION: 100 % | SYSTOLIC BLOOD PRESSURE: 99 MMHG | RESPIRATION RATE: 20 BRPM | TEMPERATURE: 98.4 F | DIASTOLIC BLOOD PRESSURE: 66 MMHG | WEIGHT: 135.8 LBS | HEART RATE: 90 BPM | BODY MASS INDEX: 20.11 KG/M2

## 2018-12-26 DIAGNOSIS — Z94.81 S/P ALLOGENEIC BONE MARROW TRANSPLANT (H): ICD-10-CM

## 2018-12-26 DIAGNOSIS — C91.01 ACUTE LYMPHOBLASTIC LEUKEMIA (ALL) IN REMISSION (H): ICD-10-CM

## 2018-12-26 DIAGNOSIS — R63.0 ANOREXIA: ICD-10-CM

## 2018-12-26 DIAGNOSIS — R11.0 NAUSEA: ICD-10-CM

## 2018-12-26 LAB
ALBUMIN SERPL-MCNC: 3.9 G/DL (ref 3.4–5)
ALP SERPL-CCNC: 72 U/L (ref 40–150)
ALT SERPL W P-5'-P-CCNC: 92 U/L (ref 0–70)
ANION GAP SERPL CALCULATED.3IONS-SCNC: 6 MMOL/L (ref 3–14)
AST SERPL W P-5'-P-CCNC: 52 U/L (ref 0–45)
BASOPHILS # BLD AUTO: 0 10E9/L (ref 0–0.2)
BASOPHILS NFR BLD AUTO: 0.6 %
BILIRUB SERPL-MCNC: 0.5 MG/DL (ref 0.2–1.3)
BUN SERPL-MCNC: 19 MG/DL (ref 7–30)
CALCIUM SERPL-MCNC: 9 MG/DL (ref 8.5–10.1)
CHLORIDE SERPL-SCNC: 110 MMOL/L (ref 94–109)
CO2 SERPL-SCNC: 24 MMOL/L (ref 20–32)
CREAT SERPL-MCNC: 0.93 MG/DL (ref 0.66–1.25)
DIFFERENTIAL METHOD BLD: ABNORMAL
EOSINOPHIL # BLD AUTO: 0.1 10E9/L (ref 0–0.7)
EOSINOPHIL NFR BLD AUTO: 2.7 %
ERYTHROCYTE [DISTWIDTH] IN BLOOD BY AUTOMATED COUNT: 18.9 % (ref 10–15)
GFR SERPL CREATININE-BSD FRML MDRD: >90 ML/MIN/{1.73_M2}
GLUCOSE SERPL-MCNC: 93 MG/DL (ref 70–99)
HCT VFR BLD AUTO: 34.4 % (ref 40–53)
HGB BLD-MCNC: 11.4 G/DL (ref 13.3–17.7)
IMM GRANULOCYTES # BLD: 0 10E9/L (ref 0–0.4)
IMM GRANULOCYTES NFR BLD: 0.6 %
LDH SERPL L TO P-CCNC: 213 U/L (ref 85–227)
LYMPHOCYTES # BLD AUTO: 0.6 10E9/L (ref 0.8–5.3)
LYMPHOCYTES NFR BLD AUTO: 10.9 %
MAGNESIUM SERPL-MCNC: 1.9 MG/DL (ref 1.6–2.3)
MCH RBC QN AUTO: 32.8 PG (ref 26.5–33)
MCHC RBC AUTO-ENTMCNC: 33.1 G/DL (ref 31.5–36.5)
MCV RBC AUTO: 99 FL (ref 78–100)
MONOCYTES # BLD AUTO: 0.6 10E9/L (ref 0–1.3)
MONOCYTES NFR BLD AUTO: 11.5 %
NEUTROPHILS # BLD AUTO: 3.8 10E9/L (ref 1.6–8.3)
NEUTROPHILS NFR BLD AUTO: 73.7 %
NRBC # BLD AUTO: 0 10*3/UL
NRBC BLD AUTO-RTO: 0 /100
PHOSPHATE SERPL-MCNC: 4.9 MG/DL (ref 2.5–4.5)
PLATELET # BLD AUTO: 170 10E9/L (ref 150–450)
POTASSIUM SERPL-SCNC: 4.5 MMOL/L (ref 3.4–5.3)
PROT SERPL-MCNC: 7 G/DL (ref 6.8–8.8)
RBC # BLD AUTO: 3.48 10E12/L (ref 4.4–5.9)
SODIUM SERPL-SCNC: 140 MMOL/L (ref 133–144)
TACROLIMUS BLD-MCNC: 7.8 UG/L (ref 5–15)
TME LAST DOSE: NORMAL H
WBC # BLD AUTO: 5.1 10E9/L (ref 4–11)

## 2018-12-26 PROCEDURE — 83735 ASSAY OF MAGNESIUM: CPT | Performed by: NURSE PRACTITIONER

## 2018-12-26 PROCEDURE — G0463 HOSPITAL OUTPT CLINIC VISIT: HCPCS | Mod: ZF

## 2018-12-26 PROCEDURE — 83615 LACTATE (LD) (LDH) ENZYME: CPT | Performed by: NURSE PRACTITIONER

## 2018-12-26 PROCEDURE — 80053 COMPREHEN METABOLIC PANEL: CPT | Performed by: NURSE PRACTITIONER

## 2018-12-26 PROCEDURE — 87799 DETECT AGENT NOS DNA QUANT: CPT | Performed by: NURSE PRACTITIONER

## 2018-12-26 PROCEDURE — 85025 COMPLETE CBC W/AUTO DIFF WBC: CPT | Performed by: NURSE PRACTITIONER

## 2018-12-26 PROCEDURE — 84100 ASSAY OF PHOSPHORUS: CPT | Performed by: NURSE PRACTITIONER

## 2018-12-26 PROCEDURE — 80197 ASSAY OF TACROLIMUS: CPT | Performed by: NURSE PRACTITIONER

## 2018-12-26 PROCEDURE — 36592 COLLECT BLOOD FROM PICC: CPT | Performed by: NURSE PRACTITIONER

## 2018-12-26 RX ORDER — DRONABINOL 2.5 MG/1
5 CAPSULE ORAL 2 TIMES DAILY
Qty: 60 CAPSULE | Refills: 0 | COMMUNITY
Start: 2018-12-26 | End: 2019-02-08

## 2018-12-26 ASSESSMENT — PAIN SCALES - GENERAL: PAINLEVEL: NO PAIN (0)

## 2018-12-26 NOTE — PROGRESS NOTES
Outpatient BMT Progress Note  Date of Service: 12/26/18    Interval History: Artemio presents to clinic today for labs and exam. He is day +54 today with recent nausea/vomiting/diarrhea now improved however still with minimal oral intake for which he continues to be TPN dependent. He states he feels well enough, but just has no appetite despite Marinol. Weight continues to decline. He is drinking about 2-3 16 oz water bottles per day- creatinine remains elevated.  Continues without fever or other overt infectious symptoms. Maintaining fair energy and overall positive demeanor. No active bleeding, recurrence of rash, or other health concerns today.     Review of Systems: Pertinent positives include those mentioned in interval events. A complete review of systems was performed and is otherwise negative.      Medications:  Please see MAR     Physical Exam:  Vital Signs for Peds 12/26/2018   SYSTOLIC 99   DIASTOLIC 66   PULSE 90   TEMPERATURE 98.4   RESPIRATIONS 20   WEIGHT (kg) 61.6 kg   HEIGHT (cm)    BMI    pain    O2 100     FEN: Sitting on exam table. Awake and alert and without distress. Father present.   HEENT: Alopecia, normocephalic, atraumatic, sclera clear, MMM without lesions or erythema. Nares patent without discharge CARD: Heart regular rate and rhythm. No murmurs, rubs or gallops. Cap refill 2 seconds.  RESP: Lungs clear throughout without increased work of breathing, crackles or wheezes.   ABD: Non-distended, non-tender, no organomegaly  SKIN: Pale, well hydrated, no rashes or abnormal bruising  ACCESS: CVC site CDI    Labs:    Results for orders placed or performed in visit on 12/26/18   Lactate Dehydrogenase   Result Value Ref Range    Lactate Dehydrogenase 213 85 - 227 U/L   Phosphorus   Result Value Ref Range    Phosphorus 4.9 (H) 2.5 - 4.5 mg/dL   Magnesium   Result Value Ref Range    Magnesium 1.9 1.6 - 2.3 mg/dL   Comprehensive metabolic panel   Result Value Ref Range    Sodium 140 133 - 144  mmol/L    Potassium 4.5 3.4 - 5.3 mmol/L    Chloride 110 (H) 94 - 109 mmol/L    Carbon Dioxide 24 20 - 32 mmol/L    Anion Gap 6 3 - 14 mmol/L    Glucose 93 70 - 99 mg/dL    Urea Nitrogen 19 7 - 30 mg/dL    Creatinine 0.93 0.66 - 1.25 mg/dL    GFR Estimate >90 >60 mL/min/[1.73_m2]    GFR Estimate If Black >90 >60 mL/min/[1.73_m2]    Calcium 9.0 8.5 - 10.1 mg/dL    Bilirubin Total 0.5 0.2 - 1.3 mg/dL    Albumin 3.9 3.4 - 5.0 g/dL    Protein Total 7.0 6.8 - 8.8 g/dL    Alkaline Phosphatase 72 40 - 150 U/L    ALT 92 (H) 0 - 70 U/L    AST 52 (H) 0 - 45 U/L   CBC with platelets differential   Result Value Ref Range    WBC 5.1 4.0 - 11.0 10e9/L    RBC Count 3.48 (L) 4.4 - 5.9 10e12/L    Hemoglobin 11.4 (L) 13.3 - 17.7 g/dL    Hematocrit 34.4 (L) 40.0 - 53.0 %    MCV 99 78 - 100 fl    MCH 32.8 26.5 - 33.0 pg    MCHC 33.1 31.5 - 36.5 g/dL    RDW 18.9 (H) 10.0 - 15.0 %    Platelet Count 170 150 - 450 10e9/L    Diff Method Automated Method     % Neutrophils 73.7 %    % Lymphocytes 10.9 %    % Monocytes 11.5 %    % Eosinophils 2.7 %    % Basophils 0.6 %    % Immature Granulocytes 0.6 %    Nucleated RBCs 0 0 /100    Absolute Neutrophil 3.8 1.6 - 8.3 10e9/L    Absolute Lymphocytes 0.6 (L) 0.8 - 5.3 10e9/L    Absolute Monocytes 0.6 0.0 - 1.3 10e9/L    Absolute Eosinophils 0.1 0.0 - 0.7 10e9/L    Absolute Basophils 0.0 0.0 - 0.2 10e9/L    Abs Immature Granulocytes 0.0 0 - 0.4 10e9/L    Absolute Nucleated RBC 0.0      Assessment/Plan: Artemio is a 18 year old male with 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 Post transplant complications include C. diff diarrhea (resolved), rash secondary to voriconazole (resolved), and poor oral nutrition for which he continues on TPN/IL. Nausea and diarrhea resolved from last week, however is still without an appetite and associated weight decline.     BMT:   High risk B cell ALL (CNS negative) + JAK2 activation/BMT: bone marrow biopsy prior to transplant (10/15) MRD 0.006%.  Conditioning per 2015-29: TBI days -4 through -1 followed by matched sibling donor transplant on 11/2   Engrafted day +15, PB VNTR 100% donor, unseparated Day +28 (11/27) bone marrow biopsy, 20-30% donor with trilineage hematopoesis, flow & FISH negative, 100% donor, normal cytogenetics. Next disease and donor evaluations due day +100.       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 Continue tacrolimus with goal 5-10. Level pending from today.      FEN/Renal:  Continue TPN/IL (increased volume due to rise in creatinine) for loss of appetite and associated weight decline. Artemio will trial 5 mg Marinol in the AM. If tolerated and effective, may also take 5 mg for evening dose. Electrolytes stable. Currently no evidence of TMA- continue to monitor weekly.     Pulmonary:  Pneumomediastinum: finding upon work up Chest CT (10/22), asymptomatic. Per radiology, likely benign, perhaps transient. No issues.      Cardiovascular: Aaron-Parkinson-White Syndrome: diagnosed upon syncope in 02/2018. Asymptomatic, no interventions to date. Work up EKG c/w WPW, sinus bradycardia at 49 bpm. Work up ECHO normal with EF 68%.     Heme:  Transfuse for hemoglobin < 8, platelets < 10,000 (no premedications needed), and receives GCSF PRN for ANC <1000. Now transfusion independent.       Infectious Disease: Continue prophylaxis with valtrex, bactrim, and fluconazole. CMV and EBV pending from today, 12/26.                                                                                                                          GI:   Nausea and diarrhea resolved from last week. Will continue on Protonix and Kytril until intake improves. He also has a mild transaminitis that should be monitored closely.       Neuro: Has melatonin available for difficulty sleeping, however he has not needed this recently. Of note, he has had right sided jaw pain upon extension for nearly one week- he denies need for intervention currently. Will continue  to monitor.      Derm: Rash secondary to voriconazole: resolved upon discontinuation.      Social: He can go out at low-traffic times.     Disposition: RTC 12/28 for labs and exam with Dr. Zavala.     BENJA Ames-LORENZA  [facility] Blood and Marrow Transplant  [facility] [address]  [phone]  [phone]    Patient Active Problem List   Diagnosis     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)     Aaron-Parkinson-White (WPW) syndrome     Bone marrow transplant candidate     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)     At risk for infection     S/P allogeneic bone marrow transplant (H)

## 2018-12-26 NOTE — PROGRESS NOTES
This is a recent snapshot of the patient's [facility] medical record.  For current drug dose and complete information and questions, call [phone] or In Basket pool, fv home infusion ([id number])  CSN Number:  [id number]

## 2018-12-26 NOTE — PROGRESS NOTES
This is a recent snapshot of the patient's [facility] medical record.  For current drug dose and complete information and questions, call [phone] or In Basket pool, fv home infusion ([id number])  CSN Number:  [id number]

## 2018-12-26 NOTE — PATIENT INSTRUCTIONS
Return to [facility] for labs and exam with Dr. Zavala on Friday 12/28 at 9:30 arrival. Please hold Tacrolimus prior to visit for blood drug level, and take this medication after level obtained.    Infusion needs: none    Patient has PICC, Central line, CVC line, to be drawn off of per lab.     Medication changes:   - May take 2 capsules (5 mg) of Dronabinol (marinol) in the AM. If effective may increase to 5 mg again at 4pm.     Care plan changes: None    Contact information  During business hours (7:30am-4:30pm):   To leave a non-urgent voicemail: call triage line [phone]    To call for time-sensitive needs or concerns : call clinic  [phone]    Evenings after 4:30pm, weekends, and holidays:   For any needs or concerns: call for BMT fellow at [phone] 911 in the case of an emergency    Thank you!     Patient is already scheduled for follow up with Dr Zavala on 12/28/2018 at 10:00am as of 12/27/2018 at 11:236am [facility]

## 2018-12-26 NOTE — NURSING NOTE
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

## 2018-12-27 ENCOUNTER — HOME INFUSION (PRE-WILLOW HOME INFUSION) (OUTPATIENT)
Dept: PHARMACY | Facility: CLINIC | Age: 18
End: 2018-12-27

## 2018-12-27 LAB
CMV DNA SPEC NAA+PROBE-ACNC: NORMAL [IU]/ML
CMV DNA SPEC NAA+PROBE-LOG#: NORMAL {LOG_IU}/ML
EBV DNA # SPEC NAA+PROBE: NORMAL {COPIES}/ML
EBV DNA SPEC NAA+PROBE-LOG#: NORMAL {LOG_COPIES}/ML
SPECIMEN SOURCE: NORMAL

## 2018-12-28 ENCOUNTER — HOME INFUSION (PRE-WILLOW HOME INFUSION) (OUTPATIENT)
Dept: PHARMACY | Facility: CLINIC | Age: 18
End: 2018-12-28

## 2018-12-28 ENCOUNTER — ONCOLOGY VISIT (OUTPATIENT)
Dept: TRANSPLANT | Facility: CLINIC | Age: 18
End: 2018-12-28
Attending: PEDIATRICS
Payer: COMMERCIAL

## 2018-12-28 VITALS
WEIGHT: 134.04 LBS | TEMPERATURE: 98.6 F | HEART RATE: 86 BPM | DIASTOLIC BLOOD PRESSURE: 75 MMHG | BODY MASS INDEX: 19.85 KG/M2 | OXYGEN SATURATION: 98 % | SYSTOLIC BLOOD PRESSURE: 112 MMHG | RESPIRATION RATE: 20 BRPM

## 2018-12-28 DIAGNOSIS — Z94.81 S/P ALLOGENEIC BONE MARROW TRANSPLANT (H): ICD-10-CM

## 2018-12-28 DIAGNOSIS — C91.01 ACUTE LYMPHOBLASTIC LEUKEMIA (ALL) IN REMISSION (H): Primary | ICD-10-CM

## 2018-12-28 LAB
ALBUMIN SERPL-MCNC: 4 G/DL (ref 3.4–5)
ALP SERPL-CCNC: 85 U/L (ref 40–150)
ALT SERPL W P-5'-P-CCNC: 137 U/L (ref 0–70)
ANION GAP SERPL CALCULATED.3IONS-SCNC: 8 MMOL/L (ref 3–14)
AST SERPL W P-5'-P-CCNC: 84 U/L (ref 0–45)
BASOPHILS # BLD AUTO: 0 10E9/L (ref 0–0.2)
BASOPHILS NFR BLD AUTO: 0.5 %
BILIRUB SERPL-MCNC: 0.5 MG/DL (ref 0.2–1.3)
BUN SERPL-MCNC: 21 MG/DL (ref 7–30)
CALCIUM SERPL-MCNC: 9 MG/DL (ref 8.5–10.1)
CHLORIDE SERPL-SCNC: 110 MMOL/L (ref 94–109)
CO2 SERPL-SCNC: 22 MMOL/L (ref 20–32)
CREAT SERPL-MCNC: 0.76 MG/DL (ref 0.66–1.25)
CREAT UR-MCNC: 150 MG/DL
DIFFERENTIAL METHOD BLD: ABNORMAL
EOSINOPHIL # BLD AUTO: 0.2 10E9/L (ref 0–0.7)
EOSINOPHIL NFR BLD AUTO: 2.7 %
ERYTHROCYTE [DISTWIDTH] IN BLOOD BY AUTOMATED COUNT: 18.4 % (ref 10–15)
GFR SERPL CREATININE-BSD FRML MDRD: >90 ML/MIN/{1.73_M2}
GLUCOSE SERPL-MCNC: 106 MG/DL (ref 70–99)
HCT VFR BLD AUTO: 35.8 % (ref 40–53)
HGB BLD-MCNC: 12.1 G/DL (ref 13.3–17.7)
IMM GRANULOCYTES # BLD: 0 10E9/L (ref 0–0.4)
IMM GRANULOCYTES NFR BLD: 0.2 %
LYMPHOCYTES # BLD AUTO: 0.6 10E9/L (ref 0.8–5.3)
LYMPHOCYTES NFR BLD AUTO: 9.8 %
MAGNESIUM SERPL-MCNC: 2 MG/DL (ref 1.6–2.3)
MCH RBC QN AUTO: 33.7 PG (ref 26.5–33)
MCHC RBC AUTO-ENTMCNC: 33.8 G/DL (ref 31.5–36.5)
MCV RBC AUTO: 100 FL (ref 78–100)
MONOCYTES # BLD AUTO: 0.8 10E9/L (ref 0–1.3)
MONOCYTES NFR BLD AUTO: 12.7 %
NEUTROPHILS # BLD AUTO: 4.4 10E9/L (ref 1.6–8.3)
NEUTROPHILS NFR BLD AUTO: 74.1 %
NRBC # BLD AUTO: 0 10*3/UL
NRBC BLD AUTO-RTO: 0 /100
PHOSPHATE SERPL-MCNC: 4.2 MG/DL (ref 2.5–4.5)
PLATELET # BLD AUTO: 177 10E9/L (ref 150–450)
POTASSIUM SERPL-SCNC: 4.5 MMOL/L (ref 3.4–5.3)
PROT SERPL-MCNC: 7.1 G/DL (ref 6.8–8.8)
PROT UR-MCNC: 0.15 G/L
PROT/CREAT 24H UR: 0.1 G/G CR (ref 0–0.2)
RBC # BLD AUTO: 3.59 10E12/L (ref 4.4–5.9)
SODIUM SERPL-SCNC: 140 MMOL/L (ref 133–144)
TACROLIMUS BLD-MCNC: 6.5 UG/L (ref 5–15)
TME LAST DOSE: NORMAL H
WBC # BLD AUTO: 5.9 10E9/L (ref 4–11)

## 2018-12-28 PROCEDURE — 80053 COMPREHEN METABOLIC PANEL: CPT | Performed by: NURSE PRACTITIONER

## 2018-12-28 PROCEDURE — 83735 ASSAY OF MAGNESIUM: CPT | Performed by: NURSE PRACTITIONER

## 2018-12-28 PROCEDURE — G0463 HOSPITAL OUTPT CLINIC VISIT: HCPCS | Mod: ZF

## 2018-12-28 PROCEDURE — 80197 ASSAY OF TACROLIMUS: CPT | Performed by: NURSE PRACTITIONER

## 2018-12-28 PROCEDURE — 36592 COLLECT BLOOD FROM PICC: CPT | Performed by: NURSE PRACTITIONER

## 2018-12-28 PROCEDURE — 84156 ASSAY OF PROTEIN URINE: CPT | Performed by: NURSE PRACTITIONER

## 2018-12-28 PROCEDURE — 85025 COMPLETE CBC W/AUTO DIFF WBC: CPT | Performed by: NURSE PRACTITIONER

## 2018-12-28 PROCEDURE — 84100 ASSAY OF PHOSPHORUS: CPT | Performed by: NURSE PRACTITIONER

## 2018-12-28 NOTE — PHARMACY-IMMUNOSUPPRESSION MONITORING
Tacrolimus Monitoring Note     D:  Current tacrolimus dose: 1.3 mg PO BID        Tacro level: 6.5 ug/L (drawn 11.7 hours post dose on an ideal 12 hour interval).  Goals for therapy = 5-10 ug/L  A:  Current trough level is within the desired range.  Drug interactions include: fluconazole   P:  Continue current dose.  Discussed recommendations with Jacqueline Fitzgerald NP.  Recheck trough level 5-7 days, or sooner if clinically necessary.  Pharmacy team will continue to follow.    Thank you  Julianne Chisholm, PharmD, MS  PGY2 Pediatric Pharmacy Resident

## 2018-12-28 NOTE — PHARMACY-CONSULT NOTE
Outpatient TPN Monitoring     Artemio's TPN formula, labs, and nutritional status were reviewed today.  The following TPN changes are recommended:  No changes  Discussed with Viky Zavala MD and communicated to [facility].     Artemio will return to clinic on Thursday 1/3 for labs and reassessment.      The following reflects the current TPN formula:  Dosing Weight:  65.1 kg  Volume:  1,600 mL, cycled over 12 hours  Protein:  98 g/day   Dextrose:  255 g  Lipids:  240 mL      Sodium:  60 mEq/day   Potassium:  10 mEq/day  Calcium:  5 mEq/day  Magnesium:  35 mEq/day   Phosphorus:  zero mMol/day  Chloride:Acetate ratio:  1:2   Trace elements with Selenium:  standard  Multivitamins:  standard  Vitamin K:   2.5 mg/day      Pharmacy will continue to follow     Julianne Chisholm, PharmD, MS  PGY2 Pediatric Pharmacy Resident

## 2018-12-28 NOTE — NURSING NOTE
Chief Complaint   Patient presents with     RECHECK     Patient is here today for ALL follow up     /75 (BP Location: Right arm, Patient Position: Fowlers, Cuff Size: Adult Large)   Pulse 86   Temp 98.6  F (37  C) (Oral)   Resp 20   Wt 60.8 kg (134 lb 0.6 oz)   SpO2 98%   BMI 19.85 kg/m      Donna Andrews LPN  December 28, 2018

## 2018-12-28 NOTE — PROGRESS NOTES
This is a recent snapshot of the patient's [facility] medical record.  For current drug dose and complete information and questions, call [phone] or In Basket pool, fv home infusion ([id number])  CSN Number:  [id number]

## 2018-12-28 NOTE — PROGRESS NOTES
This is a recent snapshot of the patient's [facility] medical record.  For current drug dose and complete information and questions, call [phone] or In Basket pool, fv home infusion ([id number])  CSN Number:  [id number]

## 2018-12-28 NOTE — PATIENT INSTRUCTIONS
Return to [facility] for labs and exam with Viky Zavala on Thursday, January 3rd. Please hold tacrolimus prior to visit for blood drug level, and take this medication after level obtained.  Infusion needs: none  Patient has central line to be drawn off of per lab.   Medication changes: none  Care plan [facility] and begin tacrolimus taper, but return as scheduled for day +100 evaluations on 2/8 and 2/15  Contact information  During business hours (7:30am-4:30pm):   To leave a non-urgent voicemail: call triage line [phone]    To call for time-sensitive needs or concerns : call clinic  [phone]  Evenings after 4:30pm, weekends, and holidays:   For any needs or concerns: call for BMT fellow at [phone] in the case of an emergency  Thank you!   Already scheduled with dr Zavala on 01/03/2019 at 2:00pm for labs and exam as of 12/31/2018 at 10:20am [facility]

## 2018-12-28 NOTE — PROGRESS NOTES
December 28, 2018    Dear Dr. Paniagua,     We had the pleasure of seeing Artemio Nino in our [facility] today. 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 He is here today with his family for follow up.     Isael continues to not eat well.  He thinks he is eating less than last week which is due to his taste buds. His marinol was increased this week- although he thinks that it has not helped.  He is now on kytril twice daily which has helped his nausea.  He continues with loose stool daily.  He has no rash.  He has no fevers, cough or runny nose.  He did not see any friends last week.  His brother Faizan is here visiting.       Review of Systems: Pertinent positives include those mentioned in interval events. A complete review of systems was performed and is otherwise negative.       Family History: unchanged from previous visit    Social History: unchanged from previous visit    Medications:  Please see MAR     Physical Exam:  /75 (BP Location: Right arm, Patient Position: Fowlers, Cuff Size: Adult Large)   Pulse 86   Temp 98.6  F (37  C) (Oral)   Resp 20   Wt 60.8 kg (134 lb 0.6 oz)   SpO2 98%   BMI 19.85 kg/m    FEN: Sitting on exam table. Awake and alert and in no apparent distress.  Father, mother, brother and friend present.   HEENT: Alopecia, normocephalic, atraumatic, sclera clear, MMM, oral lesions essentially resolved, nares patent without discharge, TM clear bilaterally    CARD: Heart regular rate and rhythm. No murmurs, rubs or gallops. Cap refill 2 seconds.  RESP: Lungs clear in upper lobes, diminished in bases, shallow respirations.  No increased work of breathing, crackles or wheezes.   ABD: Non-distended, non-tender, no organomegaly  EXTREM: No edema noted  SKIN: Rash improved.     Labs:    Results for orders placed or performed in visit on 12/28/18   Phosphorus    Result Value Ref Range    Phosphorus 4.2 2.5 - 4.5 mg/dL   Magnesium   Result Value Ref Range    Magnesium 2.0 1.6 - 2.3 mg/dL   Comprehensive metabolic panel   Result Value Ref Range    Sodium 140 133 - 144 mmol/L    Potassium 4.5 3.4 - 5.3 mmol/L    Chloride 110 (H) 94 - 109 mmol/L    Carbon Dioxide 22 20 - 32 mmol/L    Anion Gap 8 3 - 14 mmol/L    Glucose 106 (H) 70 - 99 mg/dL    Urea Nitrogen 21 7 - 30 mg/dL    Creatinine 0.76 0.66 - 1.25 mg/dL    GFR Estimate >90 >60 mL/min/[1.73_m2]    GFR Estimate If Black >90 >60 mL/min/[1.73_m2]    Calcium 9.0 8.5 - 10.1 mg/dL    Bilirubin Total 0.5 0.2 - 1.3 mg/dL    Albumin 4.0 3.4 - 5.0 g/dL    Protein Total 7.1 6.8 - 8.8 g/dL    Alkaline Phosphatase 85 40 - 150 U/L     (H) 0 - 70 U/L    AST 84 (H) 0 - 45 U/L   CBC with platelets differential   Result Value Ref Range    WBC 5.9 4.0 - 11.0 10e9/L    RBC Count 3.59 (L) 4.4 - 5.9 10e12/L    Hemoglobin 12.1 (L) 13.3 - 17.7 g/dL    Hematocrit 35.8 (L) 40.0 - 53.0 %     78 - 100 fl    MCH 33.7 (H) 26.5 - 33.0 pg    MCHC 33.8 31.5 - 36.5 g/dL    RDW 18.4 (H) 10.0 - 15.0 %    Platelet Count 177 150 - 450 10e9/L    Diff Method Automated Method     % Neutrophils 74.1 %    % Lymphocytes 9.8 %    % Monocytes 12.7 %    % Eosinophils 2.7 %    % Basophils 0.5 %    % Immature Granulocytes 0.2 %    Nucleated RBCs 0 0 /100    Absolute Neutrophil 4.4 1.6 - 8.3 10e9/L    Absolute Lymphocytes 0.6 (L) 0.8 - 5.3 10e9/L    Absolute Monocytes 0.8 0.0 - 1.3 10e9/L    Absolute Eosinophils 0.2 0.0 - 0.7 10e9/L    Absolute Basophils 0.0 0.0 - 0.2 10e9/L    Abs Immature Granulocytes 0.0 0 - 0.4 10e9/L    Absolute Nucleated RBC 0.0      Pending: CMV       Assessment/Plan:  Artemio is a 18 year old male with 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 Post transplant complications include C. diff diarrhea (resolved), rash secondary to voriconazole which has resolved, and poor oral nutrition ( on TPN/IL).     BMT:   High  risk B cell ALL (CNS negative) + JAK2 activation/BMT: bone marrow biopsy prior to transplant (10/15) MRD 0.006%.   Conditioning per 2015-29: TBI days -4 through -1 followed by matched sibling donor transplant on 11/2   Engrafted day +15, PB VNTR 100% donor, unseparated Day +28 (11/27) bone marrow biopsy, 20-30% donor with trilineage hematopoesis, flow & FISH negative, 100% donor.  Chromosomes negative. We will get donor studies for day 60 next week.       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   Continue tacrolimus. Level pending today.      FEN/Renal:  Risk for malnutrition: continue TPN/IL with increased volume due to creatinine, cycled over 12 hours. On marinol.  Encouraged oral intake and increased fats to help with weight.  Isael does not like protein shakes so refuses to drink them.  Will get the dietician to see him next week.  His creatinine has improved.     Pulmonary:  Pneumomediastinum: finding upon work up Chest CT (10/22), asymptomatic. Per radiology, likely benign, perhaps transient. No issues.      Cardiovascular: Aaron-Parkinson-White Syndrome: diagnosed upon syncope in 02/2018. Asymptomatic, no interventions to date. Work up EKG c/w WPW, sinus bradycardia at 49 bpm. Work up ECHO normal with EF 68%.     Heme:  Pancytopenia secondary to chemotherapy-resolved.       Infectious Disease: Continue prophylaxis with valtrex, bactrim, and fluconazole.                                                                                                                           GI:   Reflux precaution: Continue protonix until PO intake improved. Nausea management:continue kytril.  Also has looser stool- just once daily- will monitor for now.  He has a history of C. Diff so if needed will test for it.       Neuro: Insomnia-not taking melatonin so will discontinue.      Derm: Rash: secondary to voriconazole- and resolved.      Social-He can go out at low-traffic times. And he can drive.       Disposition:  RTC 1/3 for labs and exam with me and will start his tacrolimus taper. We will also send him back to you at that time!  We should plan on restarting ruxolitinib once he returns to you as he is high risk of his disease relapsing.     Sincerely,     Viky Zavala MD, PhD    Pediatric Blood and Marrow Transplant  [facility]

## 2018-12-31 NOTE — PROGRESS NOTES
This is a recent snapshot of the patient's [facility] medical record.  For current drug dose and complete information and questions, call [phone] or In Basket pool, fv home infusion ([id number])  CSN Number:  [id number]

## 2019-01-01 ENCOUNTER — HOME INFUSION (PRE-WILLOW HOME INFUSION) (OUTPATIENT)
Dept: PHARMACY | Facility: CLINIC | Age: 19
End: 2019-01-01

## 2019-01-02 NOTE — PROGRESS NOTES
This is a recent snapshot of the patient's [facility] medical record.  For current drug dose and complete information and questions, call [phone] or In Basket pool, fv home infusion ([id number])  CSN Number:  [id number]

## 2019-01-03 ENCOUNTER — ONCOLOGY VISIT (OUTPATIENT)
Dept: TRANSPLANT | Facility: CLINIC | Age: 19
End: 2019-01-03
Attending: PEDIATRICS
Payer: COMMERCIAL

## 2019-01-03 ENCOUNTER — HOME INFUSION (PRE-WILLOW HOME INFUSION) (OUTPATIENT)
Dept: PHARMACY | Facility: CLINIC | Age: 19
End: 2019-01-03

## 2019-01-03 VITALS
HEART RATE: 61 BPM | RESPIRATION RATE: 20 BRPM | TEMPERATURE: 98 F | DIASTOLIC BLOOD PRESSURE: 63 MMHG | WEIGHT: 136.47 LBS | OXYGEN SATURATION: 97 % | SYSTOLIC BLOOD PRESSURE: 98 MMHG | BODY MASS INDEX: 20.21 KG/M2

## 2019-01-03 DIAGNOSIS — C91.01 ACUTE LYMPHOBLASTIC LEUKEMIA (ALL) IN REMISSION (H): Primary | ICD-10-CM

## 2019-01-03 DIAGNOSIS — Z94.81 S/P ALLOGENEIC BONE MARROW TRANSPLANT (H): ICD-10-CM

## 2019-01-03 DIAGNOSIS — R11.0 NAUSEA: ICD-10-CM

## 2019-01-03 DIAGNOSIS — Z78.9 ON TOTAL PARENTERAL NUTRITION: ICD-10-CM

## 2019-01-03 LAB
ALBUMIN SERPL-MCNC: 4 G/DL (ref 3.4–5)
ALP SERPL-CCNC: 101 U/L (ref 40–150)
ALT SERPL W P-5'-P-CCNC: 179 U/L (ref 0–70)
ANION GAP SERPL CALCULATED.3IONS-SCNC: 6 MMOL/L (ref 3–14)
AST SERPL W P-5'-P-CCNC: 73 U/L (ref 0–45)
BASOPHILS # BLD AUTO: 0 10E9/L (ref 0–0.2)
BASOPHILS NFR BLD AUTO: 0.4 %
BILIRUB SERPL-MCNC: 0.6 MG/DL (ref 0.2–1.3)
BUN SERPL-MCNC: 20 MG/DL (ref 7–30)
CALCIUM SERPL-MCNC: 8.9 MG/DL (ref 8.5–10.1)
CHLORIDE SERPL-SCNC: 109 MMOL/L (ref 94–109)
CO2 SERPL-SCNC: 25 MMOL/L (ref 20–32)
CREAT SERPL-MCNC: 0.96 MG/DL (ref 0.66–1.25)
DIFFERENTIAL METHOD BLD: ABNORMAL
EOSINOPHIL # BLD AUTO: 0.2 10E9/L (ref 0–0.7)
EOSINOPHIL NFR BLD AUTO: 3.9 %
ERYTHROCYTE [DISTWIDTH] IN BLOOD BY AUTOMATED COUNT: 17 % (ref 10–15)
GFR SERPL CREATININE-BSD FRML MDRD: >90 ML/MIN/{1.73_M2}
GLUCOSE SERPL-MCNC: 93 MG/DL (ref 70–99)
HCT VFR BLD AUTO: 35.2 % (ref 40–53)
HGB BLD-MCNC: 11.9 G/DL (ref 13.3–17.7)
IMM GRANULOCYTES # BLD: 0 10E9/L (ref 0–0.4)
IMM GRANULOCYTES NFR BLD: 0.5 %
INR PPP: 1.12 (ref 0.86–1.14)
LDH SERPL L TO P-CCNC: 205 U/L (ref 85–227)
LYMPHOCYTES # BLD AUTO: 0.7 10E9/L (ref 0.8–5.3)
LYMPHOCYTES NFR BLD AUTO: 13.1 %
MAGNESIUM SERPL-MCNC: 1.6 MG/DL (ref 1.6–2.3)
MCH RBC QN AUTO: 33.8 PG (ref 26.5–33)
MCHC RBC AUTO-ENTMCNC: 33.8 G/DL (ref 31.5–36.5)
MCV RBC AUTO: 100 FL (ref 78–100)
MONOCYTES # BLD AUTO: 0.7 10E9/L (ref 0–1.3)
MONOCYTES NFR BLD AUTO: 11.8 %
NEUTROPHILS # BLD AUTO: 3.9 10E9/L (ref 1.6–8.3)
NEUTROPHILS NFR BLD AUTO: 70.3 %
NRBC # BLD AUTO: 0 10*3/UL
NRBC BLD AUTO-RTO: 0 /100
PHOSPHATE SERPL-MCNC: 4.5 MG/DL (ref 2.5–4.5)
PLATELET # BLD AUTO: 166 10E9/L (ref 150–450)
POTASSIUM SERPL-SCNC: 4.5 MMOL/L (ref 3.4–5.3)
PROT SERPL-MCNC: 7 G/DL (ref 6.8–8.8)
RBC # BLD AUTO: 3.52 10E12/L (ref 4.4–5.9)
SODIUM SERPL-SCNC: 140 MMOL/L (ref 133–144)
WBC # BLD AUTO: 5.6 10E9/L (ref 4–11)

## 2019-01-03 PROCEDURE — 86359 T CELLS TOTAL COUNT: CPT | Performed by: PEDIATRICS

## 2019-01-03 PROCEDURE — 36592 COLLECT BLOOD FROM PICC: CPT | Performed by: PEDIATRICS

## 2019-01-03 PROCEDURE — G0463 HOSPITAL OUTPT CLINIC VISIT: HCPCS | Mod: ZF

## 2019-01-03 PROCEDURE — 84100 ASSAY OF PHOSPHORUS: CPT | Performed by: PEDIATRICS

## 2019-01-03 PROCEDURE — 82784 ASSAY IGA/IGD/IGG/IGM EACH: CPT | Performed by: PEDIATRICS

## 2019-01-03 PROCEDURE — G0463 HOSPITAL OUTPT CLINIC VISIT: HCPCS | Mod: 25

## 2019-01-03 PROCEDURE — 86360 T CELL ABSOLUTE COUNT/RATIO: CPT | Performed by: PEDIATRICS

## 2019-01-03 PROCEDURE — 85610 PROTHROMBIN TIME: CPT | Performed by: PEDIATRICS

## 2019-01-03 PROCEDURE — 83615 LACTATE (LD) (LDH) ENZYME: CPT | Performed by: PEDIATRICS

## 2019-01-03 PROCEDURE — 86355 B CELLS TOTAL COUNT: CPT | Performed by: PEDIATRICS

## 2019-01-03 PROCEDURE — 25000128 H RX IP 250 OP 636: Mod: ZF | Performed by: STUDENT IN AN ORGANIZED HEALTH CARE EDUCATION/TRAINING PROGRAM

## 2019-01-03 PROCEDURE — 83735 ASSAY OF MAGNESIUM: CPT | Performed by: PEDIATRICS

## 2019-01-03 PROCEDURE — 81268 CHIMERISM ANAL W/CELL SELECT: CPT | Mod: 91 | Performed by: PEDIATRICS

## 2019-01-03 PROCEDURE — 86357 NK CELLS TOTAL COUNT: CPT | Performed by: PEDIATRICS

## 2019-01-03 PROCEDURE — 85025 COMPLETE CBC W/AUTO DIFF WBC: CPT | Performed by: PEDIATRICS

## 2019-01-03 PROCEDURE — 90686 IIV4 VACC NO PRSV 0.5 ML IM: CPT | Mod: ZF | Performed by: STUDENT IN AN ORGANIZED HEALTH CARE EDUCATION/TRAINING PROGRAM

## 2019-01-03 PROCEDURE — 80053 COMPREHEN METABOLIC PANEL: CPT | Performed by: PEDIATRICS

## 2019-01-03 PROCEDURE — G0008 ADMIN INFLUENZA VIRUS VAC: HCPCS

## 2019-01-03 RX ADMIN — INFLUENZA A VIRUS A/MICHIGAN/45/2015 X-275 (H1N1) ANTIGEN (FORMALDEHYDE INACTIVATED), INFLUENZA A VIRUS A/SINGAPORE/INFIMH-16-0019/2016 IVR-186 (H3N2) ANTIGEN (FORMALDEHYDE INACTIVATED), INFLUENZA B VIRUS B/PHUKET/3073/2013 ANTIGEN (FORMALDEHYDE INACTIVATED), AND INFLUENZA B VIRUS B/MARYLAND/15/2016 BX-69A ANTIGEN (FORMALDEHYDE INACTIVATED) 0.5 ML: 15; 15; 15; 15 INJECTION, SUSPENSION INTRAMUSCULAR at 15:06

## 2019-01-03 NOTE — PHARMACY
Outpatient TPN Monitoring     Artemio's TPN formula, labs, and nutritional status were reviewed today.  The following TPN changes are recommended:  No changes  Discussed with Viky Zavala MD and communicated to [facility].     [facility] and his primary MD will be Dr. Manjinder Paniagua      The following reflects the current TPN formula:  Dosing Weight:  65.1 kg  Volume:  1,600 mL, cycled over 12 hours  Protein:  98 g/day   Dextrose:  255 g  Lipids:  240 mL      Sodium:  60 mEq/day   Potassium:  10 mEq/day  Calcium:  5 mEq/day  Magnesium:  35 mEq/day   Phosphorus:  zero mMol/day  Chloride:Acetate ratio:  1:2   Trace elements with Selenium:  standard  Multivitamins:  standard  Vitamin K:   2.5 mg/day      Margaret Kelly, PharmD

## 2019-01-03 NOTE — PHARMACY-TAPERING SERVICE
Artemio coronel Tacrolimus Taper Plan:  Current Dose: 1.3 mg twice daily    Recommend to decrease tacrolimus weekly on Fridays according to the following schedule:    1/4 1.2 mg (1.2 mL) by mouth twice daily  1/11 1 mg (1 mL) by mouth twice daily  1/18 0.9 mg (0.9 mL) by mouth twice daily  1/25 0.8 mg (0.8 mL) by mouth twice daily  2/1 0.6 mg (0.6 mL) by mouth twice daily  2/8 0.5 mg (0.5 mL) by mouth twice daily  2/15 0.4 mg (0.4 mL) by mouth twice daily  2/22 0.2 mg (0.2 mL) by mouth twice daily  3/1 0.1 mg (0.1 mL) by mouth twice daily  3/8 0.1 mg (0.1 mL) by mouth once daily  3/15 Off tacrolimus    Clinical pharmacy will continue to monitor.  January 2019 Sunday Monday Tuesday Wednesday Thursday Friday Saturday     1   2   3  1.3 mL  twice daily   4  1.2 mL  twice daily 5     6   7   8   9   10   11  1 mL  twice daily 12     13   14   15   16   17   18  0.9 mL  twice daily 19     20   21   22   23   24   25  0.8 mL  twice daily 26     27   28   29   30   31 February 2019 Sunday Monday Tuesday Wednesday Thursday Friday Saturday        1  0.6 mL  twice daily     2       3     4     5     6     7     8  0.5 mL  twice daily   9       10     11     12     13     14     15  0.4 mL  twice daily   16       17     18     19     20     21     22  0.2 mL  twice daily 23       24     25     26     27     28                 March 2019 Sunday Monday Tuesday Wednesday Thursday Friday Saturday        1  0.1 mL  twice daily     2       3     4     5     6     7     8  0.1 mL  ONCE daily   9       10     11     12     13     14     15  Off tacro!   16

## 2019-01-03 NOTE — NURSING NOTE
Chief Complaint   Patient presents with     RECHECK     Patient is here today for ALL follow up     BP 98/63 (BP Location: Right arm, Patient Position: Fowlers, Cuff Size: Adult Regular)   Pulse 61   Temp 98  F (36.7  C) (Oral)   Resp 20   Wt 61.9 kg (136 lb 7.4 oz)   SpO2 97%   BMI 20.21 kg/m      Donna Andrews LPN  January 3, 2019

## 2019-01-03 NOTE — LETTER
1/3/2019      RE: Artemio Nino  [address] [address]       January 3, 2019    Dear Dr. Paniagua,    We had the pleasure of seeing Artemio Nino in our [facility] today. 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 He is here today with his family for follow up.     Isael said he has been doing really well but also said he just does not feel like eating maybe because of his taste buds issue. He feels that both increased doses in marinol and kytril have somewhat helped his nausea and appetite. He denied any vomiting, nausea, abdominal pain, or diarrhea. He has normal/soft bowel movements approximately once daily. He denied any fevers, cough, runny nose, sore throat, or rash.      Review of Systems: Pertinent positives include those mentioned in interval events. A complete review of systems was performed and is otherwise negative.       Family History: unchanged from previous visit  Social History: unchanged from previous visit    Medications:  Please see MAR     Physical Exam:  BP 98/63 (BP Location: Right arm, Patient Position: Fowlers, Cuff Size: Adult Regular)   Pulse 61   Temp 98  F (36.7  C) (Oral)   Resp 20   Wt 61.9 kg (136 lb 7.4 oz)   SpO2 97%   BMI 20.21 kg/m     FEN: Sitting on exam table. Awake and alert and in no apparent distress.  Father, mother, step-mother, and step-father present.  HEENT: Alopecia, normocephalic, atraumatic, sclera clear, MMM, no oral lesions, nares patent without discharge  CARD: Heart regular rate and rhythm. No murmurs, rubs or gallops. Cap refill 2 seconds.  RESP: Lungs clear in upper lobes, diminished in bases, shallow respirations.  No increased work of breathing, crackles or wheezes.   ABD: Non-distended, non-tender, no organomegaly  EXTREM: No edema noted  SKIN: No rash    Labs:    Results for orders placed or  performed in visit on 01/03/19   INR   Result Value Ref Range    INR 1.12 0.86 - 1.14   Lactate Dehydrogenase   Result Value Ref Range    Lactate Dehydrogenase 205 85 - 227 U/L   Phosphorus   Result Value Ref Range    Phosphorus 4.5 2.5 - 4.5 mg/dL   Magnesium   Result Value Ref Range    Magnesium 1.6 1.6 - 2.3 mg/dL   Comprehensive metabolic panel   Result Value Ref Range    Sodium 140 133 - 144 mmol/L    Potassium 4.5 3.4 - 5.3 mmol/L    Chloride 109 94 - 109 mmol/L    Carbon Dioxide 25 20 - 32 mmol/L    Anion Gap 6 3 - 14 mmol/L    Glucose 93 70 - 99 mg/dL    Urea Nitrogen 20 7 - 30 mg/dL    Creatinine 0.96 0.66 - 1.25 mg/dL    GFR Estimate >90 >60 mL/min/[1.73_m2]    GFR Estimate If Black >90 >60 mL/min/[1.73_m2]    Calcium 8.9 8.5 - 10.1 mg/dL    Bilirubin Total 0.6 0.2 - 1.3 mg/dL    Albumin 4.0 3.4 - 5.0 g/dL    Protein Total 7.0 6.8 - 8.8 g/dL    Alkaline Phosphatase 101 40 - 150 U/L     (H) 0 - 70 U/L    AST 73 (H) 0 - 45 U/L   CBC with platelets differential   Result Value Ref Range    WBC 5.6 4.0 - 11.0 10e9/L    RBC Count 3.52 (L) 4.4 - 5.9 10e12/L    Hemoglobin 11.9 (L) 13.3 - 17.7 g/dL    Hematocrit 35.2 (L) 40.0 - 53.0 %     78 - 100 fl    MCH 33.8 (H) 26.5 - 33.0 pg    MCHC 33.8 31.5 - 36.5 g/dL    RDW 17.0 (H) 10.0 - 15.0 %    Platelet Count 166 150 - 450 10e9/L    Diff Method Automated Method     % Neutrophils 70.3 %    % Lymphocytes 13.1 %    % Monocytes 11.8 %    % Eosinophils 3.9 %    % Basophils 0.4 %    % Immature Granulocytes 0.5 %    Nucleated RBCs 0 0 /100    Absolute Neutrophil 3.9 1.6 - 8.3 10e9/L    Absolute Lymphocytes 0.7 (L) 0.8 - 5.3 10e9/L    Absolute Monocytes 0.7 0.0 - 1.3 10e9/L    Absolute Eosinophils 0.2 0.0 - 0.7 10e9/L    Absolute Basophils 0.0 0.0 - 0.2 10e9/L    Abs Immature Granulocytes 0.0 0 - 0.4 10e9/L    Absolute Nucleated RBC 0.0      Pending: CMV, PB chimerism, T cell subset, IgG    Assessment/Plan:  Artemio is a 18 year old male with VHR B cell ALL +  JAK2 activation now day +62 s/p MSD BMT. Post transplant complications include C. diff diarrhea (resolved), rash secondary to voriconazole (resolved), and poor oral nutrition ( on TPN/IL). Currently he is doing well without active symptoms.    BMT:   High risk B cell ALL (CNS negative) + JAK2 activation/BMT: bone marrow biopsy prior to transplant (10/15) MRD 0.006%.   Conditioning per 2015-29: TBI days -4 through -1 followed by matched sibling donor transplant on 11/2   Engrafted day +15, PB VNTR 100% donor, unseparated Day +28 (11/27) bone marrow biopsy, 20-30% donor with trilineage hematopoesis, flow & FISH negative, 100% donor.  Chromosomes negative. We will get donor studies for day 60 today. Please start jakafi next week.        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 We will start tapering his tacrolimus from tomorrow by 10% weekly (it will be off on 3/15/19).     FEN/Renal:  Risk for malnutrition: continue minimal oral intake and on TPN/IL with increased volume due to creatinine, cycled over 12 hours. On marinol. Goal is to be off of TPN by day 100.     Pulmonary:  History of pneumomediastinum: finding upon work up Chest CT (10/22), asymptomatic. Per radiology, likely benign, perhaps transient. No issues.      Cardiovascular: Aaron-Parkinson-White Syndrome: diagnosed upon syncope in 02/2018. Asymptomatic, no interventions to date. Work up EKG c/w WPW, sinus bradycardia at 49 bpm. Work up ECHO normal with EF 68%.     Heme:  Pancytopenia secondary to chemotherapy-resolved. Stable counts today.      Infectious Disease: Continue prophylaxis with valtrex, bactrim, and fluconazole.  If he continues with transaminitis you can stop his fluconazole next week.  He received his Flu shot today.    GI:   Reflux precaution: Continue protonix until PO intake improved. Nausea management: continue kytril.       Derm: History of rash secondary to voriconazole- resolved.    Social-He can go out at low-traffic times.  And he can drive.       Disposition: We will transfer his care to Dr. Paniagua at [facility]. We should plan on restarting ruxolitinib once he returns to you as he is high risk of his disease relapsing. We will plan to see him in 1 month for Day+100 visit with a line removal and disease evaluation.    Sincerely,     Viky Zavala MD, PhD    Pediatric Blood and Marrow Transplant  [facility]    Written by Rosa Abdullahi MD (Fellow, Pediatric Hem/Onc/BMT)    I, Viky Zavala MD PhD, saw this patient with the fellow and agree with the fellow s findings and plan of care as documented in the fellow s note.      Viky Zavala MD

## 2019-01-03 NOTE — PROGRESS NOTES
January 3, 2019    Dear Dr. Paniagua,    We had the pleasure of seeing Artemio Nino in our [facility] Clinic today. 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 He is here today with his family for follow up.     Isael said he has been doing really well but also said he just does not feel like eating maybe because of his taste buds issue. He feels that both increased doses in marinol and kytril have somewhat helped his nausea and appetite. He denied any vomiting, nausea, abdominal pain, or diarrhea. He has normal/soft bowel movements approximately once daily. He denied any fevers, cough, runny nose, sore throat, or rash.      Review of Systems: Pertinent positives include those mentioned in interval events. A complete review of systems was performed and is otherwise negative.       Family History: unchanged from previous visit  Social History: unchanged from previous visit    Medications:  Please see MAR     Physical Exam:  BP 98/63 (BP Location: Right arm, Patient Position: Fowlers, Cuff Size: Adult Regular)   Pulse 61   Temp 98  F (36.7  C) (Oral)   Resp 20   Wt 61.9 kg (136 lb 7.4 oz)   SpO2 97%   BMI 20.21 kg/m    FEN: Sitting on exam table. Awake and alert and in no apparent distress.  Father, mother, step-mother, and step-father present.  HEENT: Alopecia, normocephalic, atraumatic, sclera clear, MMM, no oral lesions, nares patent without discharge  CARD: Heart regular rate and rhythm. No murmurs, rubs or gallops. Cap refill 2 seconds.  RESP: Lungs clear in upper lobes, diminished in bases, shallow respirations.  No increased work of breathing, crackles or wheezes.   ABD: Non-distended, non-tender, no organomegaly  EXTREM: No edema noted  SKIN: No rash    Labs:    Results for orders placed or performed in visit on 01/03/19   INR   Result Value Ref Range    INR 1.12 0.86 - 1.14   Lactate  Dehydrogenase   Result Value Ref Range    Lactate Dehydrogenase 205 85 - 227 U/L   Phosphorus   Result Value Ref Range    Phosphorus 4.5 2.5 - 4.5 mg/dL   Magnesium   Result Value Ref Range    Magnesium 1.6 1.6 - 2.3 mg/dL   Comprehensive metabolic panel   Result Value Ref Range    Sodium 140 133 - 144 mmol/L    Potassium 4.5 3.4 - 5.3 mmol/L    Chloride 109 94 - 109 mmol/L    Carbon Dioxide 25 20 - 32 mmol/L    Anion Gap 6 3 - 14 mmol/L    Glucose 93 70 - 99 mg/dL    Urea Nitrogen 20 7 - 30 mg/dL    Creatinine 0.96 0.66 - 1.25 mg/dL    GFR Estimate >90 >60 mL/min/[1.73_m2]    GFR Estimate If Black >90 >60 mL/min/[1.73_m2]    Calcium 8.9 8.5 - 10.1 mg/dL    Bilirubin Total 0.6 0.2 - 1.3 mg/dL    Albumin 4.0 3.4 - 5.0 g/dL    Protein Total 7.0 6.8 - 8.8 g/dL    Alkaline Phosphatase 101 40 - 150 U/L     (H) 0 - 70 U/L    AST 73 (H) 0 - 45 U/L   CBC with platelets differential   Result Value Ref Range    WBC 5.6 4.0 - 11.0 10e9/L    RBC Count 3.52 (L) 4.4 - 5.9 10e12/L    Hemoglobin 11.9 (L) 13.3 - 17.7 g/dL    Hematocrit 35.2 (L) 40.0 - 53.0 %     78 - 100 fl    MCH 33.8 (H) 26.5 - 33.0 pg    MCHC 33.8 31.5 - 36.5 g/dL    RDW 17.0 (H) 10.0 - 15.0 %    Platelet Count 166 150 - 450 10e9/L    Diff Method Automated Method     % Neutrophils 70.3 %    % Lymphocytes 13.1 %    % Monocytes 11.8 %    % Eosinophils 3.9 %    % Basophils 0.4 %    % Immature Granulocytes 0.5 %    Nucleated RBCs 0 0 /100    Absolute Neutrophil 3.9 1.6 - 8.3 10e9/L    Absolute Lymphocytes 0.7 (L) 0.8 - 5.3 10e9/L    Absolute Monocytes 0.7 0.0 - 1.3 10e9/L    Absolute Eosinophils 0.2 0.0 - 0.7 10e9/L    Absolute Basophils 0.0 0.0 - 0.2 10e9/L    Abs Immature Granulocytes 0.0 0 - 0.4 10e9/L    Absolute Nucleated RBC 0.0      Pending: CMV, PB chimerism, T cell subset, IgG    Assessment/Plan:  Artemio is a 18 year old male with 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 Post transplant complications include C. diff diarrhea  (resolved), rash secondary to voriconazole (resolved), and poor oral nutrition ( on TPN/IL). Currently he is doing well without active symptoms.    BMT:   High risk B cell ALL (CNS negative) + JAK2 activation/BMT: bone marrow biopsy prior to transplant (10/15) MRD 0.006%.   Conditioning per 2015-29: TBI days -4 through -1 followed by matched sibling donor transplant on 11/2   Engrafted day +15, PB VNTR 100% donor, unseparated Day +28 (11/27) bone marrow biopsy, 20-30% donor with trilineage hematopoesis, flow & FISH negative, 100% donor.  Chromosomes negative. We will get donor studies for day 60 today. Please start jakafi next week.        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 We will start tapering his tacrolimus from tomorrow by 10% weekly (it will be off on 3/15/19).     FEN/Renal:  Risk for malnutrition: continue minimal oral intake and on TPN/IL with increased volume due to creatinine, cycled over 12 hours. On marinol. Goal is to be off of TPN by day 100.     Pulmonary:  History of pneumomediastinum: finding upon work up Chest CT (10/22), asymptomatic. Per radiology, likely benign, perhaps transient. No issues.      Cardiovascular: Aaron-Parkinson-White Syndrome: diagnosed upon syncope in 02/2018. Asymptomatic, no interventions to date. Work up EKG c/w WPW, sinus bradycardia at 49 bpm. Work up ECHO normal with EF 68%.     Heme:  Pancytopenia secondary to chemotherapy-resolved. Stable counts today.      Infectious Disease: Continue prophylaxis with valtrex, bactrim, and fluconazole.  If he continues with transaminitis you can stop his fluconazole next week.  He received his Flu shot today.    GI:   Reflux precaution: Continue protonix until PO intake improved. Nausea management: continue kytril.       Derm: History of rash secondary to voriconazole- resolved.    Social-He can go out at low-traffic times. And he can drive.       Disposition: We will transfer his care to Dr. Paniagua at [facility]  [place]. We should plan on restarting ruxolitinib once he returns to you as he is high risk of his disease relapsing. We will plan to see him in 1 month for Day+100 visit with a line removal and disease evaluation.    Sincerely,     Viky Zavala MD, PhD    Pediatric Blood and Marrow Transplant  [facility]    Written by Rosa Abdullahi MD (Fellow, Pediatric Hem/Onc/BMT)    I, Viky Zavala MD PhD, saw this patient with the fellow and agree with the fellow s findings and plan of care as documented in the fellow s note.

## 2019-01-04 ENCOUNTER — HOME INFUSION (PRE-WILLOW HOME INFUSION) (OUTPATIENT)
Dept: PHARMACY | Facility: CLINIC | Age: 19
End: 2019-01-04

## 2019-01-04 LAB
CD19 CELLS # BLD: 238 CELLS/UL (ref 107–698)
CD19 CELLS NFR BLD: 32 % (ref 6–27)
CD3 CELLS # BLD: 197 CELLS/UL (ref 603–2990)
CD3 CELLS NFR BLD: 27 % (ref 49–84)
CD3+CD4+ CELLS # BLD: 115 CELLS/UL (ref 441–2156)
CD3+CD4+ CELLS NFR BLD: 16 % (ref 28–63)
CD3+CD4+ CELLS/CD3+CD8+ CLL BLD: 1.33 % (ref 1.4–2.6)
CD3+CD8+ CELLS # BLD: 86 CELLS/UL (ref 125–1312)
CD3+CD8+ CELLS NFR BLD: 12 % (ref 10–40)
CD3-CD16+CD56+ CELLS # BLD: 271 CELLS/UL (ref 95–640)
CD3-CD16+CD56+ CELLS NFR BLD: 37 % (ref 4–25)
CMV DNA SPEC NAA+PROBE-ACNC: NORMAL [IU]/ML
CMV DNA SPEC NAA+PROBE-LOG#: NORMAL {LOG_IU}/ML
IFC SPECIMEN: ABNORMAL
IGG SERPL-MCNC: 830 MG/DL (ref 695–1620)
SPECIMEN SOURCE: NORMAL

## 2019-01-04 NOTE — PROGRESS NOTES
This is a recent snapshot of the patient's [facility] medical record.  For current drug dose and complete information and questions, call [phone] or In Basket pool, fv home infusion ([id number])  CSN Number:  [id number]

## 2019-01-07 ENCOUNTER — HOME INFUSION (PRE-WILLOW HOME INFUSION) (OUTPATIENT)
Dept: PHARMACY | Facility: CLINIC | Age: 19
End: 2019-01-07

## 2019-01-07 LAB
COPATH REPORT: NORMAL
COPATH REPORT: NORMAL

## 2019-01-07 NOTE — PROGRESS NOTES
This is a recent snapshot of the patient's [facility] medical record.  For current drug dose and complete information and questions, call [phone] or In Basket pool, fv home infusion ([id number])  CSN Number:  [id number]

## 2019-01-08 NOTE — PROGRESS NOTES
This is a recent snapshot of the patient's [facility] medical record.  For current drug dose and complete information and questions, call [phone] or In Basket pool, fv home infusion ([id number])  CSN Number:  [id number]

## 2019-01-11 ENCOUNTER — HOME INFUSION (PRE-WILLOW HOME INFUSION) (OUTPATIENT)
Dept: PHARMACY | Facility: CLINIC | Age: 19
End: 2019-01-11

## 2019-01-16 NOTE — ADDENDUM NOTE
Addended by: ALVAREZ JULIO on: 12/11/2018 05:11 PM     Modules accepted: Orders    
Detail Level: Simple
Additional Notes: Patientâs acne has improved but she is concerned with scarring. She is experiencing some burning and dryness with the Aczone. Recommended using product every other day.
Additional Notes: Discussed options of treatment, microdermabrasion, peels and lightening  cream.  We call in rx to [facility] for 4% Hydroquinone.  Also recommended using sunblock

## 2019-01-17 ENCOUNTER — HOME INFUSION (PRE-WILLOW HOME INFUSION) (OUTPATIENT)
Dept: PHARMACY | Facility: CLINIC | Age: 19
End: 2019-01-17

## 2019-01-18 ENCOUNTER — HOME INFUSION (PRE-WILLOW HOME INFUSION) (OUTPATIENT)
Dept: PHARMACY | Facility: CLINIC | Age: 19
End: 2019-01-18

## 2019-01-18 NOTE — PROGRESS NOTES
This is a recent snapshot of the patient's [facility] medical record.  For current drug dose and complete information and questions, call [phone] or In Basket pool, fv home infusion ([id number])  CSN Number:  [id number]

## 2019-01-21 NOTE — PROGRESS NOTES
This is a recent snapshot of the patient's [facility] medical record.  For current drug dose and complete information and questions, call [phone] or In Basket pool, fv home infusion ([id number])  CSN Number:  [id number]

## 2019-01-24 ENCOUNTER — HOME INFUSION (PRE-WILLOW HOME INFUSION) (OUTPATIENT)
Dept: PHARMACY | Facility: CLINIC | Age: 19
End: 2019-01-24

## 2019-01-31 ENCOUNTER — MEDICAL CORRESPONDENCE (OUTPATIENT)
Dept: TRANSPLANT | Facility: CLINIC | Age: 19
End: 2019-01-31

## 2019-02-01 DIAGNOSIS — C91.01 ACUTE LYMPHOBLASTIC LEUKEMIA (ALL) IN REMISSION (H): Primary | ICD-10-CM

## 2019-02-01 DIAGNOSIS — Z94.81 STATUS POST BONE MARROW TRANSPLANT (H): ICD-10-CM

## 2019-02-08 ENCOUNTER — ANESTHESIA (OUTPATIENT)
Dept: PEDIATRICS | Facility: CLINIC | Age: 19
End: 2019-02-08
Payer: COMMERCIAL

## 2019-02-08 ENCOUNTER — HOSPITAL ENCOUNTER (OUTPATIENT)
Dept: INTERVENTIONAL RADIOLOGY/VASCULAR | Facility: CLINIC | Age: 19
End: 2019-02-08
Attending: PEDIATRICS
Payer: COMMERCIAL

## 2019-02-08 ENCOUNTER — OFFICE VISIT (OUTPATIENT)
Dept: DERMATOLOGY | Facility: CLINIC | Age: 19
End: 2019-02-08
Attending: DERMATOLOGY
Payer: COMMERCIAL

## 2019-02-08 ENCOUNTER — ANESTHESIA EVENT (OUTPATIENT)
Dept: PEDIATRICS | Facility: CLINIC | Age: 19
End: 2019-02-08
Payer: COMMERCIAL

## 2019-02-08 ENCOUNTER — ONCOLOGY VISIT (OUTPATIENT)
Dept: TRANSPLANT | Facility: CLINIC | Age: 19
End: 2019-02-08
Attending: NURSE PRACTITIONER
Payer: COMMERCIAL

## 2019-02-08 ENCOUNTER — HOSPITAL ENCOUNTER (OUTPATIENT)
Facility: CLINIC | Age: 19
Discharge: HOME | End: 2019-02-08
Attending: RADIOLOGY | Admitting: RADIOLOGY
Payer: COMMERCIAL

## 2019-02-08 VITALS
TEMPERATURE: 97.5 F | DIASTOLIC BLOOD PRESSURE: 56 MMHG | OXYGEN SATURATION: 100 % | SYSTOLIC BLOOD PRESSURE: 103 MMHG | HEART RATE: 55 BPM | RESPIRATION RATE: 22 BRPM

## 2019-02-08 VITALS
HEIGHT: 69 IN | BODY MASS INDEX: 20.15 KG/M2 | HEART RATE: 96 BPM | DIASTOLIC BLOOD PRESSURE: 78 MMHG | WEIGHT: 136.02 LBS | SYSTOLIC BLOOD PRESSURE: 111 MMHG | RESPIRATION RATE: 12 BRPM | OXYGEN SATURATION: 100 % | TEMPERATURE: 98 F

## 2019-02-08 DIAGNOSIS — C91.01 ACUTE LYMPHOBLASTIC LEUKEMIA (ALL) IN REMISSION (H): ICD-10-CM

## 2019-02-08 DIAGNOSIS — R11.0 NAUSEA: ICD-10-CM

## 2019-02-08 DIAGNOSIS — C91.01 ACUTE LYMPHOBLASTIC LEUKEMIA (ALL) IN REMISSION (H): Primary | ICD-10-CM

## 2019-02-08 DIAGNOSIS — Z91.89 AT RISK FOR GRAFT VERSUS HOST DISEASE: ICD-10-CM

## 2019-02-08 DIAGNOSIS — L70.8 DEMODEX ACNE: Primary | ICD-10-CM

## 2019-02-08 DIAGNOSIS — Z94.81 S/P ALLOGENEIC BONE MARROW TRANSPLANT (H): ICD-10-CM

## 2019-02-08 DIAGNOSIS — Z78.9 ON TOTAL PARENTERAL NUTRITION: ICD-10-CM

## 2019-02-08 LAB
ALBUMIN SERPL-MCNC: 3.9 G/DL (ref 3.4–5)
ALP SERPL-CCNC: 48 U/L (ref 40–150)
ALT SERPL W P-5'-P-CCNC: 40 U/L (ref 0–70)
ANION GAP SERPL CALCULATED.3IONS-SCNC: 6 MMOL/L (ref 3–14)
APTT PPP: 34 SEC (ref 22–37)
AST SERPL W P-5'-P-CCNC: 23 U/L (ref 0–45)
BASOPHILS # BLD AUTO: 0 10E9/L (ref 0–0.2)
BASOPHILS NFR BLD AUTO: 0 %
BILIRUB SERPL-MCNC: 0.8 MG/DL (ref 0.2–1.3)
BUN SERPL-MCNC: 9 MG/DL (ref 7–30)
CALCIUM SERPL-MCNC: 8.7 MG/DL (ref 8.5–10.1)
CHLORIDE SERPL-SCNC: 111 MMOL/L (ref 94–109)
CO2 SERPL-SCNC: 24 MMOL/L (ref 20–32)
CREAT SERPL-MCNC: 0.88 MG/DL (ref 0.66–1.25)
DIFFERENTIAL METHOD BLD: ABNORMAL
EOSINOPHIL # BLD AUTO: 0.1 10E9/L (ref 0–0.7)
EOSINOPHIL NFR BLD AUTO: 4.7 %
ERYTHROCYTE [DISTWIDTH] IN BLOOD BY AUTOMATED COUNT: 15.4 % (ref 10–15)
GFR SERPL CREATININE-BSD FRML MDRD: >90 ML/MIN/{1.73_M2}
GLUCOSE SERPL-MCNC: 97 MG/DL (ref 70–99)
HCT VFR BLD AUTO: 24.2 % (ref 40–53)
HGB BLD-MCNC: 8.5 G/DL (ref 13.3–17.7)
IMM GRANULOCYTES # BLD: 0 10E9/L (ref 0–0.4)
IMM GRANULOCYTES NFR BLD: 0.4 %
INR PPP: 1.21 (ref 0.86–1.14)
LYMPHOCYTES # BLD AUTO: 0.4 10E9/L (ref 0.8–5.3)
LYMPHOCYTES NFR BLD AUTO: 14.8 %
MCH RBC QN AUTO: 35.1 PG (ref 26.5–33)
MCHC RBC AUTO-ENTMCNC: 35.1 G/DL (ref 31.5–36.5)
MCV RBC AUTO: 100 FL (ref 78–100)
MONOCYTES # BLD AUTO: 0.5 10E9/L (ref 0–1.3)
MONOCYTES NFR BLD AUTO: 16.2 %
NEUTROPHILS # BLD AUTO: 1.8 10E9/L (ref 1.6–8.3)
NEUTROPHILS NFR BLD AUTO: 63.9 %
NRBC # BLD AUTO: 0 10*3/UL
NRBC BLD AUTO-RTO: 0 /100
PLATELET # BLD AUTO: 60 10E9/L (ref 150–450)
POTASSIUM SERPL-SCNC: 4 MMOL/L (ref 3.4–5.3)
PROT SERPL-MCNC: 6.6 G/DL (ref 6.8–8.8)
RBC # BLD AUTO: 2.42 10E12/L (ref 4.4–5.9)
SODIUM SERPL-SCNC: 141 MMOL/L (ref 133–144)
WBC # BLD AUTO: 2.8 10E9/L (ref 4–11)

## 2019-02-08 PROCEDURE — 25000128 H RX IP 250 OP 636: Performed by: NURSE ANESTHETIST, CERTIFIED REGISTERED

## 2019-02-08 PROCEDURE — 88185 FLOWCYTOMETRY/TC ADD-ON: CPT | Performed by: NURSE PRACTITIONER

## 2019-02-08 PROCEDURE — 40000611 ZZHCL STATISTIC MORPHOLOGY W/INTERP HEMEPATH TC 85060: Performed by: NURSE PRACTITIONER

## 2019-02-08 PROCEDURE — 88271 CYTOGENETICS DNA PROBE: CPT | Performed by: NURSE PRACTITIONER

## 2019-02-08 PROCEDURE — 81267 CHIMERISM ANAL NO CELL SELEC: CPT | Performed by: NURSE PRACTITIONER

## 2019-02-08 PROCEDURE — 88184 FLOWCYTOMETRY/ TC 1 MARKER: CPT | Performed by: NURSE PRACTITIONER

## 2019-02-08 PROCEDURE — 88311 DECALCIFY TISSUE: CPT | Performed by: NURSE PRACTITIONER

## 2019-02-08 PROCEDURE — 88237 TISSUE CULTURE BONE MARROW: CPT | Performed by: NURSE PRACTITIONER

## 2019-02-08 PROCEDURE — 85025 COMPLETE CBC W/AUTO DIFF WBC: CPT | Performed by: NURSE PRACTITIONER

## 2019-02-08 PROCEDURE — 40001011 ZZH STATISTIC PRE-PROCEDURE NURSING ASSESSMENT: Performed by: NURSE PRACTITIONER

## 2019-02-08 PROCEDURE — 88280 CHROMOSOME KARYOTYPE STUDY: CPT | Performed by: NURSE PRACTITIONER

## 2019-02-08 PROCEDURE — 40000165 ZZH STATISTIC POST-PROCEDURE RECOVERY CARE: Performed by: NURSE PRACTITIONER

## 2019-02-08 PROCEDURE — 38222 DX BONE MARROW BX & ASPIR: CPT | Performed by: NURSE PRACTITIONER

## 2019-02-08 PROCEDURE — 88161 CYTOPATH SMEAR OTHER SOURCE: CPT | Performed by: NURSE PRACTITIONER

## 2019-02-08 PROCEDURE — 40000567 ZZHCL STATISTIC BONE MARROW ASP PERF TC ACL/CSC 38220: Performed by: NURSE PRACTITIONER

## 2019-02-08 PROCEDURE — 85610 PROTHROMBIN TIME: CPT | Performed by: NURSE PRACTITIONER

## 2019-02-08 PROCEDURE — 40001004 ZZHCL STATISTIC FLOW INT 9-15 ABY TC 88188: Performed by: NURSE PRACTITIONER

## 2019-02-08 PROCEDURE — 00000161 ZZHCL STATISTIC H-SPHEME PROCESS B/S: Performed by: NURSE PRACTITIONER

## 2019-02-08 PROCEDURE — 37000009 ZZH ANESTHESIA TECHNICAL FEE, EACH ADDTL 15 MIN: Performed by: NURSE PRACTITIONER

## 2019-02-08 PROCEDURE — 27210134 ZZH KIT BIOPSY BONE MARROW: Performed by: NURSE PRACTITIONER

## 2019-02-08 PROCEDURE — 40000951 ZZHCL STATISTIC BONE MARROW INTERP TC 85097: Performed by: NURSE PRACTITIONER

## 2019-02-08 PROCEDURE — G0463 HOSPITAL OUTPT CLINIC VISIT: HCPCS | Mod: ZF

## 2019-02-08 PROCEDURE — 88305 TISSUE EXAM BY PATHOLOGIST: CPT | Performed by: NURSE PRACTITIONER

## 2019-02-08 PROCEDURE — 25000125 ZZHC RX 250

## 2019-02-08 PROCEDURE — 80053 COMPREHEN METABOLIC PANEL: CPT | Performed by: NURSE PRACTITIONER

## 2019-02-08 PROCEDURE — 36592 COLLECT BLOOD FROM PICC: CPT | Performed by: NURSE PRACTITIONER

## 2019-02-08 PROCEDURE — 85730 THROMBOPLASTIN TIME PARTIAL: CPT | Performed by: NURSE PRACTITIONER

## 2019-02-08 PROCEDURE — G0463 HOSPITAL OUTPT CLINIC VISIT: HCPCS | Mod: 25,27

## 2019-02-08 PROCEDURE — 25000128 H RX IP 250 OP 636

## 2019-02-08 PROCEDURE — 36589 REMOVAL TUNNELED CV CATH: CPT | Mod: LT

## 2019-02-08 PROCEDURE — 37000008 ZZH ANESTHESIA TECHNICAL FEE, 1ST 30 MIN: Performed by: NURSE PRACTITIONER

## 2019-02-08 PROCEDURE — 40000564 ZZHCL STATISTIC BONE MARROW CORE PERF TC ACL/CSC 38221: Performed by: NURSE PRACTITIONER

## 2019-02-08 PROCEDURE — 88264 CHROMOSOME ANALYSIS 20-25: CPT | Performed by: NURSE PRACTITIONER

## 2019-02-08 PROCEDURE — 36589 REMOVAL TUNNELED CV CATH: CPT | Performed by: NURSE PRACTITIONER

## 2019-02-08 PROCEDURE — 88275 CYTOGENETICS 100-300: CPT | Performed by: NURSE PRACTITIONER

## 2019-02-08 RX ORDER — PROPOFOL 10 MG/ML
INJECTION, EMULSION INTRAVENOUS CONTINUOUS PRN
Status: DISCONTINUED | OUTPATIENT
Start: 2019-02-08 | End: 2019-02-08

## 2019-02-08 RX ORDER — PROPOFOL 10 MG/ML
INJECTION, EMULSION INTRAVENOUS PRN
Status: DISCONTINUED | OUTPATIENT
Start: 2019-02-08 | End: 2019-02-08

## 2019-02-08 RX ORDER — ONDANSETRON 2 MG/ML
INJECTION INTRAMUSCULAR; INTRAVENOUS PRN
Status: DISCONTINUED | OUTPATIENT
Start: 2019-02-08 | End: 2019-02-08

## 2019-02-08 RX ORDER — LIDOCAINE HYDROCHLORIDE 10 MG/ML
INJECTION, SOLUTION EPIDURAL; INFILTRATION; INTRACAUDAL; PERINEURAL
Status: DISCONTINUED
Start: 2019-02-08 | End: 2019-02-08 | Stop reason: WASHOUT

## 2019-02-08 RX ORDER — TACROLIMUS 1 MG/G
OINTMENT TOPICAL 2 TIMES DAILY
Qty: 60 G | Refills: 1 | Status: SHIPPED | OUTPATIENT
Start: 2019-02-08 | End: 2019-05-14

## 2019-02-08 RX ORDER — FENTANYL CITRATE 50 UG/ML
INJECTION, SOLUTION INTRAMUSCULAR; INTRAVENOUS PRN
Status: DISCONTINUED | OUTPATIENT
Start: 2019-02-08 | End: 2019-02-08

## 2019-02-08 RX ORDER — SODIUM CHLORIDE, SODIUM LACTATE, POTASSIUM CHLORIDE, CALCIUM CHLORIDE 600; 310; 30; 20 MG/100ML; MG/100ML; MG/100ML; MG/100ML
INJECTION, SOLUTION INTRAVENOUS CONTINUOUS PRN
Status: DISCONTINUED | OUTPATIENT
Start: 2019-02-08 | End: 2019-02-08

## 2019-02-08 RX ORDER — IVERMECTIN 3 MG/1
TABLET ORAL
Qty: 8 TABLET | Refills: 0 | Status: SHIPPED | OUTPATIENT
Start: 2019-02-08 | End: 2019-02-15

## 2019-02-08 RX ORDER — PANTOPRAZOLE SODIUM 40 MG/1
40 TABLET, DELAYED RELEASE ORAL DAILY
Qty: 30 TABLET | Refills: 3
Start: 2019-02-08 | End: 2019-02-15

## 2019-02-08 RX ORDER — LIDOCAINE HYDROCHLORIDE 10 MG/ML
INJECTION, SOLUTION EPIDURAL; INFILTRATION; INTRACAUDAL; PERINEURAL
Status: COMPLETED
Start: 2019-02-08 | End: 2019-02-08

## 2019-02-08 RX ORDER — HEPARIN SODIUM (PORCINE) LOCK FLUSH IV SOLN 100 UNIT/ML 100 UNIT/ML
SOLUTION INTRAVENOUS
Status: COMPLETED
Start: 2019-02-08 | End: 2019-02-08

## 2019-02-08 RX ADMIN — SODIUM CHLORIDE, POTASSIUM CHLORIDE, SODIUM LACTATE AND CALCIUM CHLORIDE: 600; 310; 30; 20 INJECTION, SOLUTION INTRAVENOUS at 11:02

## 2019-02-08 RX ADMIN — Medication 500 UNITS: at 12:46

## 2019-02-08 RX ADMIN — ONDANSETRON 4 MG: 2 INJECTION INTRAMUSCULAR; INTRAVENOUS at 11:21

## 2019-02-08 RX ADMIN — PROPOFOL 80 MG: 10 INJECTION, EMULSION INTRAVENOUS at 11:07

## 2019-02-08 RX ADMIN — PROPOFOL 300 MCG/KG/MIN: 10 INJECTION, EMULSION INTRAVENOUS at 11:07

## 2019-02-08 RX ADMIN — FENTANYL CITRATE 25 MCG: 50 INJECTION, SOLUTION INTRAMUSCULAR; INTRAVENOUS at 11:07

## 2019-02-08 ASSESSMENT — MIFFLIN-ST. JEOR: SCORE: 1611.37

## 2019-02-08 NOTE — ANESTHESIA POSTPROCEDURE EVALUATION
Anesthesia POST Procedure Evaluation    Patient: Artemio Nino   MRN:     [id number] Gender:   male   Age:    18 year old :      2000        Preoperative Diagnosis: ALL   Procedure(s):  BIOPSY BONE MARROW  Tunneled line removal   Postop Comments: No value filed.       Anesthesia Type:  General    Reportable Event: NO     PAIN: Uncomplicated   Sign Out status: Comfortable, Well controlled pain     PONV: No PONV   Sign Out status:  No Nausea or Vomiting     Neuro/Psych: Uneventful perioperative course   Sign Out Status: Preoperative baseline; Age appropriate mentation     Airway/Resp.: Uneventful perioperative course   Sign Out Status: Airway Device present     CV: Uneventful perioperative course   Sign Out status: Appropriate BP and perfusion indices; Appropriate HR/Rhythm     Disposition:   Sign Out in:  Peds sedation  Recovery Course: Uneventful  Follow-Up: Not required           Last Anesthesia Record Vitals:  CRNA VITALS  2019 1128 - 2019 1228      2019             NIBP:  92/44    Pulse:  69    Temp:  37.3  C (99.1  F)    SpO2:  100 %    Resp Rate (observed):  16          Last PACU/Preop Vitals:  Vitals:    19 1200 19 1215 19 1230   BP: 92/44 97/52 103/56   Pulse: 70 66 55   Resp: 16  22   Temp: 36.3  C (97.3  F) 36.3  C (97.4  F) 36.4  C (97.5  F)   SpO2: 100% 100% 100%         Electronically Signed By: Tonja Gutierrez MD, 2019, 1:02 PM

## 2019-02-08 NOTE — PATIENT INSTRUCTIONS
[facility]- Pediatric Dermatology  Dr. Rosamaria Gill, Dr. Laura Schaffer, Dr. Abdirizak Delacruz, Dr. Jazmín Bowden, Dr. Alvaro Leon       Pediatric Appointment Scheduling and Call Center [phone]     Non Urgent -Triage Voicemail Line; [phone]- Nica and Harriet RN's. Messages are checked periodically throughout the day and are returned as soon as possible.      Clinic Fax number: [phone]    If you need a prescription refill, please contact your pharmacy. They will send us an electronic request. Refills are approved or denied by our Physicians during normal business hours, Monday through Fridays    Per office policy, refills will not be granted if you have not been seen within the past year (or sooner depending on your child's condition)    *Radiology Scheduling- [phone]  *Sedation Unit Scheduling- [phone]  *[place] Scheduling- General [phone]; Pediatric Dermatology [phone]  *Main  Services: [phone]   Syrian: [phone]   Czech: [phone]   Hmong/Northern Irish/Jm: [phone]    For urgent matters that cannot wait until the next business day, is over a holiday and/or a weekend please call [phone] and ask for the Dermatology Resident On-Call to be paged.         Artemio has demodex reaction on the face. The skin scraping showed mites  We will take ivermectin one dose tonight and one other dose in 7 days  Do the protopic oint twice daily even on eyelids and cover with aquaphor multiple times a day

## 2019-02-08 NOTE — LETTER
2/8/2019    RE: Artemio Nino  [address] [facility] [address]     Pediatric Dermatology Return Patient Visit  February 8, 2019       Dermatology problem list  1. Dermatitis--34 days post transplant, neck, trunk, and distal arms  -Bx'ed 12/6/178, unsure if GVHD or a different eczematous process  -voriconazole D/c'd in December.     Referring Physician: HANNAH Santos  CC: New rash over the past 3 weeks  HPI: Isael is a 18 year old male with a hx of B-cell  days s/p sibling BMT seen at the request of Candido Han urgently today for a predominantly facial rash. As you know, Isael had a rash back in December which was more eczematous in natures. He was seen by my colleague Dr. Wang who was concerned regarding possible drug eruption versus GVHD. A biopsy was done in December which had mostly features of drug reaction. He was on voriconazole, and given the distribution it was felt that this was the most likely etiology. His vori was d/c'd and he was transitioned to fluconazole and the prior rash resolved (eczematous patches on neck and chest)    Three weeks ago he was started on Jakifi (ruxilitinib) which seemed to be the starting factor of the rash. It was thought that the fluconazole increased the potency of the jakifi and so this was held 2 weeks ago.     Isael presents today with his mother for a 3 week rash that is worsening on the face, it is itchy - and he has dry eyes. He is using HC lotion right now but even water causes burning and stinging. Also on ears and neck, but not on the body. His eyes feel irritated, the eyelids on the inside are burning and painful, itchy. He washes with water on his face once daily.      Past Medical/Surgical History: : is  negative  for surgeries or significant illness. There is no medical history of migraine, seizure, mood disorder, chemical dependency, cardiovascular, lung or gastrointestinal disease, back or joint problems or skin problems.  No history of  eczema      Family History:   Family History   No family history on file.     Social History:        Social History   Substance Use Topics     Smoking status: Never Smoker     Smokeless tobacco: Never Used     Alcohol use Not on file      Medications:       Current Outpatient Prescriptions   Medication     dronabinol (MARINOL) 2.5 MG capsule     emollient (VANICREAM) cream     granisetron (KYTRIL) 1 MG tablet     hydrOXYzine (ATARAX) 10 MG/5ML syrup     LORazepam (ATIVAN) 1 MG tablet     melatonin 3 MG tablet     mycophenolate (GENERIC EQUIVALENT) 500 MG tablet     oxyCODONE (ROXICODONE) 5 MG tablet     pantoprazole (PROTONIX) 40 MG EC tablet     sulfamethoxazole-trimethoprim (BACTRIM DS/SEPTRA DS) 800-160 MG per tablet     tacrolimus (GENERIC EQUIVALENT) 1 mg/mL suspension     triamcinolone (KENALOG) 0.1 % external ointment     valACYclovir (VALTREX) 1000 mg tablet     voriconazole (VFEND) 200 MG tablet     acetaminophen (TYLENOL) 325 MG tablet     hydrocortisone 1 % ointment      No current facility-administered medications for this visit.       Allergies:            Allergies   Allergen Reactions     Blood Transfusion Related (Informational Only) Other (See Comments)       Patient has a history of a clinically significant antibody against RBC antigens.  A delay in compatible RBCs may occur.Stem cell transplant patient.  Give type O RBCs.     No Known Drug Allergies        ROS: a 10 point review of systems including constitutional, HEENT, CV, GI, musculoskeletal, Neurologic, Endocrine, Respiratory, Hematologic and Allergic/Immunologic was performed and was negative except for GI upset. And itchy skin  Physical examination:   There were no vitals taken for this visit.  General: Well-developed, well-nourished in no apparent distress  Eyes: lids, conjunctivae normal  Neck: supple  Respiratory: breathing comfortably  Cardiovascular: Well-perfused without edema or varicosities  Abdomen: non distended  Psychiatric:  normal mood and affect  Extremities: No clubbing or cyanosis, nails normal  Skin: Skin examination of the scalp, face, neck, chest, abdomen, back, right and left arms, hands, fingers, ears, eyelids, and lips. This was notable for:  -facial eczematous plaques involving the entire face and periorbital     Skin scraping positive for demodex - + for several mites                              Assessment:  1.  Demodex reaction  Multiple demodex mites were evident on skin scraping and oil microscopy today. Given the timing post BMT and his current immunosuppression, hypersensitivity to demodex is the most likely etiology. I recommended a course of oral ivermecting at 200mcrograms/kg x 1 dose to be repeated in 7 days. In addition. protopic oint 0.1% bid to the face and eyelids will be helpful for the inflammatory reaction. Gentle cleansing of the head and neck daily and liberal vaseline or aquaphor as an emollient will be helpful.     I warned that there may be temporary desquamation following the oral ivermectin, as this has been reported in the literature and experienced by other patients.         Follow up in 4 weeks, sooner prn.     Thank you for this consultation.     Abdirizak Delacruz MD  , Dermatology & Pediatrics  [facility]  [facility], 12th Floor  Count includes the [facility] [place] [address]  [phone] (clinic phone)  [phone] (fax)

## 2019-02-08 NOTE — DISCHARGE INSTRUCTIONS
[facility]  Pediatric Interventional Radiology  Discharge Instructions for Tunneled Central Venous Catheter Removal    Date of Procedure: 2/8/2019      Today you had a Tunneled Central Venous Catheter Removed by HANNAH Jackson IR      Activity    No strenuous activity for 1 week    No heavy lifting (greater than 10 pounds) for 3 days     No tub bath, hot tub, or swimming until Steri-strips are no longer there (about 7 days)    It is OK to shower.  Cover the dressing with plastic wrap before taking your shower.     Diet    Resume your regular diet    Discomfort     Pain medications as directed    Site Care    Keep the dressing dry and intact.  Keep the wound clean and dry for 3 days.  After 3 days you may remove the dressing and use Band-Aids until the wound is healed.  Do not remove the Steri-strips for at least 7 days.      If there is any oozing or bleeding from the site, apply direct pressure for 5-10 minutes with a gauze pad.  If bleeding continues after 10 minutes, call Pediatric Interventional Radiology.  If bleeding cannot be controlled with direct pressure, call [phone].      If sedation was given:    DO NOT drive or operate heavy machinery for 24 hours    DO NOT drink alcoholic beverages for 24 hours    DO NOT make important legal decisions for 24 hours    You must have a responsible adult to drive you home and stay with you for 24 hours    Call your Doctor if:    Bleeding    Swelling in your neck or arm    Fever greater than 100.5 degrees F (oral)    Other signs of infection such as redness, tenderness, or drainage from the wound    If you have questions or concerns about this procedure:  Pediatric Interventional Radiology [phone] Mon-Fri, 7am to 5pm    [phone] After-hours, weekends, holidays  Ask for the Pediatric Interventional Radiologist on-call         [facility]  [phone]    Care for Bone Marrow Biopsy    Do not remove bandage/dressing for  24 hours -- after this time they can be removed.     No bath, shower or soaking of the dressing for 24 hours    Activity as tolerated by the patient    Diet as able to tolerate    May use Tylenol as needed for pain control    Can apply icepack to the site for discomfort -- no more than 10 minutes at a time    If bleeding presents, apply pressure for 5 minutes    Call [phone] ask for Peds BMT/Hem/Onc fellow on call if complications arise including:     persistent bleeding    fever greater than 100.5    pain       Home Instructions for Your Child after Sedation  Today your child received (medicine):  Propofol, Fentanyl and Zofran  Please keep this form with your health records  Your child may be more sleepy and irritable today than normal. Wake your child up every 1 to 11/2 hours during the day. (This way, both you and your child will sleep through the night.) Also, an adult should stay with your child for the rest of the day. The medicine may make the child dizzy. Avoid activities that require balance (bike riding, skating, climbing stairs, walking).  Remember:    When your child wants to eat again, start with liquids (juice, soda pop, Popsicles). If your child feels well enough, you may try a regular diet. It is best to offer light meals for the first 24 hours.    If your child has nausea (feels sick to the stomach) or vomiting (throws up), give small amounts of clear liquids (7-Up, Sprite, apple juice or broth). Fluids are more important than food until your child is feeling better.    Wait 24 hours before giving medicine that contains alcohol. This includes liquid cold, cough and allergy medicines (Robitussin, Vicks Formula 44 for children, Benadryl, Chlor-Trimeton).    If you will leave your child with a , give the sitter a copy of these instructions.  Call your doctor if:    Your child vomits (throws up) more than two times.    If your child has trouble breathing, call [phone].  If you have any  questions or concerns, please call:  Pediatric Sedation Unit [phone]  Pediatric clinic  [phone]  [facility]  [phone] (ask for the BMT doctor on call)  Emergency department [phone]  [facility] toll-free number [phone] (Monday--Friday, 8 a.m. to 4:30 p.m.)  I understand these instructions. I have all of my personal belongings.

## 2019-02-08 NOTE — ANESTHESIA PREPROCEDURE EVALUATION
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)      WPW (Aaron-Parkinson-White syndrome) 2018      Past Surgical History:   Procedure Laterality Date     BONE MARROW BIOPSY, BONE SPECIMEN, NEEDLE/TROCAR Right 2018    Procedure: bone marrow biopsy;  Surgeon: Jacqueline Fitzgerald NP;  Location: [facility] PEDS SEDATION      INSERT CATHETER VASCULAR ACCESS DOUBLE LUMEN CHILD N/A 10/26/2018    Procedure: double lumen tunneled line placement;  Surgeon: Rex Valente MD;  Location: [facility] PEDS SEDATION      O CLAVICLE LEFT Left     fracture with surgical repair and plate/screws     ORTHOPEDIC SURGERY      femur          Anesthesia Evaluation    ROS/Med Hx   Comments: Has tolerated anesthesia in the past.      Cardiovascular Findings   Comments: WPW  - one episode of syncope.    EKG from 10/18: Sinus bradycardia w/short AZ interval  ECHO 10/18: normal biventricular function  ziopath 10/18:  Cannot rule out pre-excitation at higher HR.              GI/Hepatic/Renal Findings   Comments: Hx of requiring TPN in the past    Hx of N/V -none recently       Genetic/Syndrome Findings   Comments: 100+ day BMB    Hematology/Oncology Findings   (+) cancer and hematopoietic stem cell transplant            PHYSICAL EXAM:   Mental Status/Neuro: A/A/O   Airway: Facies: Feasible  Mallampati: II  Mouth/Opening: Not Assessed  TM distance: < 6 cm  Neck ROM: Full   Respiratory: Auscultation: CTAB     Resp. Rate: Normal     Resp. Effort: Normal      CV: Rhythm: Regular  Rate: Age appropriate  Heart: Normal Sounds   Comments:      Dental: Normal                    Lab Results   Component Value Date    WBC 2.8 (L) 2019    HGB 8.5 (L) 2019    HCT 24.2 (L)  "02/08/2019    PLT 60 (L) 02/08/2019     02/08/2019    POTASSIUM 4.0 02/08/2019    CHLORIDE 111 (H) 02/08/2019    CO2 24 02/08/2019    BUN 9 02/08/2019    CR 0.88 02/08/2019    GLC 97 02/08/2019    ELSA 8.7 02/08/2019    PHOS 4.5 01/03/2019    MAG 1.6 01/03/2019    ALBUMIN 3.9 02/08/2019    PROTTOTAL 6.6 (L) 02/08/2019    ALT 40 02/08/2019    AST 23 02/08/2019    ALKPHOS 48 02/08/2019    BILITOTAL 0.8 02/08/2019    PTT 34 02/08/2019    INR 1.21 (H) 02/08/2019    FIBR 384 11/26/2018         Preop Vitals  BP Readings from Last 3 Encounters:   02/08/19 111/78   01/03/19 98/63   12/28/18 112/75    Pulse Readings from Last 3 Encounters:   02/08/19 96   01/03/19 61   12/28/18 86      Resp Readings from Last 3 Encounters:   02/08/19 12   01/03/19 20   12/28/18 20    SpO2 Readings from Last 3 Encounters:   02/08/19 100%   01/03/19 97%   12/28/18 98%      Temp Readings from Last 1 Encounters:   02/08/19 36.7  C (98  F) (Oral)    Ht Readings from Last 1 Encounters:   02/08/19 1.743 m (5' 8.62\")      Wt Readings from Last 1 Encounters:   02/08/19 61.7 kg (136 lb 0.4 oz)    Estimated body mass index is 20.31 kg/m  as calculated from the following:    Height as of an earlier encounter on 2/8/19: 1.743 m (5' 8.62\").    Weight as of an earlier encounter on 2/8/19: 61.7 kg (136 lb 0.4 oz).     LDA:  Port A Cath Single Left Chest wall (Active)   Access Date 10/27/18 11/26/2018  7:00 PM   Gauge/Length 22 gauge;3/4 inch 11/24/2018  9:00 PM   Site Assessment WDL 11/27/2018 11:24 AM   De-Access Date 10/24/18 11/8/2018 12:00 PM   Date to be Reflushed 12/15/18 11/27/2018 11:24 AM   Number of days:        CVC Double Lumen 10/26/18 Right Internal jugular (Active)   Site Assessment WDL 2/8/2019 10:00 AM   Dressing Intervention New dressing;Dressing reinforced;Securing device;Transparent;Chlorhexidine sponge 12/3/2018 12:00 PM   Dressing Change Due 12/10/18 12/3/2018 12:00 PM   CVC Comment Purple heplocked 11/27/2018 11:24 AM "   Extravasation? No 11/27/2018 11:24 AM   Number of days: 105       Airway - Adult/Peds (Active)   Number of days: 105          Assessment:   ASA SCORE: 3    NPO Status: > 6 hours since completed Solid Foods   Documentation: H&P complete; Preop Testing complete; Consents complete   Proceeding: Proceed without further delay  Tobacco Use:  Active user of Tobacco     Plan:   Anes. Type:  General   Pre-Induction: None   Induction:  IV (Standard)   Airway: Native Airway   Access/Monitoring: PIV   Maintenance: Propofol; IV   Emergence: Recovery Site (PACU/ICU)   Logistics: Remote Procedure; Same Day Surgery     PONV Management:  Adult Risk Factors:  Prevention: Propofol Infusion; Ondansetron     CONSENT: Direct conversation   Plan and risks discussed with: Patient; Mother; Father   Blood Products: Consent Deferred (Minimal Blood Loss)       Comments for Plan/Consent:  Discussed common and potentially harmful risks for General Anesthesia, Native Airway.   These risks include, but were not limited to: Conversion to secured airway, Sore throat, Airway injury, Dental injury, Aspiration, Respiratory issues (Bronchospasm, Laryngospasm, Desaturation), Hemodynamic issues (Arrhythmia, Hypotension, Ischemia), Potential long term consequences of respiratory and hemodynamic issues, PONV, Emergence delirium  Risks of invasive procedures were not discussed: N/A    All questions were answered.               Tonja Gutierrez MD

## 2019-02-08 NOTE — NURSING NOTE
"Chief Complaint   Patient presents with     RECHECK     Patient is here today for ALL follow up     /78 (BP Location: Left arm, Patient Position: Fowlers, Cuff Size: Adult Regular)   Pulse 96   Temp 98  F (36.7  C) (Oral)   Resp 12   Ht 1.743 m (5' 8.62\")   Wt 61.7 kg (136 lb 0.4 oz)   SpO2 100%   BMI 20.31 kg/m      Donna Andrews LPN  February 8, 2019  "

## 2019-02-08 NOTE — PROGRESS NOTES
"    We had the pleasure of seeing Artemio Nino in our Pediatric Blood and Marrow Transplant Clinic today. 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 He is here today with his family for follow up prior to his BMB and burns line removal.     [facility]  He has stopped TPN & IL and is eating better.  Weight is stable at 136lb/60 kg.  He has stopped using marinol and has kytrl prn. He denied any vomiting, nausea, abdominal pain, or diarrhea. He has normal/soft bowel movements approximately once daily. He denied any fevers, cough, runny nose, sore throat.    He does have a new onset rash.  Per parents report (which is not concrete as they do not know exact dates) they think he took his Jakafi BID dosing for about 5-7 days and then developed a rash on his face.  He stopped the Jakafi then and has been off since 1/24.  His rash has waxed and waned but hasnt' resolved.  Today they feel it's worse.  He is using hydrocortisone cream 1x a day. Says the cream hurts to put on. Helps a little with the itch but the itching will return.  Rash is along his cheeks, forehead, jaw line and behind ears.  No other areas.           Review of Systems: Pertinent positives include those mentioned in interval events. A complete review of systems was performed and is otherwise negative.       Family History: unchanged from previous visit  Social History: unchanged from previous visit    Medications:  Please see MAR     Physical Exam:  /78 (BP Location: Left arm, Patient Position: Fowlers, Cuff Size: Adult Regular)   Pulse 96   Temp 98  F (36.7  C) (Oral)   Resp 12   Ht 1.743 m (5' 8.62\")   Wt 61.7 kg (136 lb 0.4 oz)   SpO2 100%   BMI 20.31 kg/m    FEN: Sitting on exam table. Awake and alert and in no apparent distress.  Father & mother present.  HEENT: Alopecia, normocephalic, atraumatic, sclera " clear, MMM, no oral lesions, nares patent without discharge  CARD: Heart regular rate and rhythm. No murmurs, rubs or gallops. Cap refill 2 seconds.  RESP: Lungs clear in upper lobes, diminished in bases, shallow respirations.  No increased work of breathing, crackles or wheezes.   ABD: Non-distended, non-tender, no organomegaly  EXTREM: No edema noted  SKIN: rash on face - dry, cracking, no vesicles, no open areas.  Mild erythematous areas. Along both cheeks, forehead, jawline and behind ears bilaterally   Galvan line present with port-a-cath unaccessed.     Labs:    Results for orders placed or performed in visit on 02/08/19   CBC with platelets and differential   Result Value Ref Range    WBC 2.8 (L) 4.0 - 11.0 10e9/L    RBC Count 2.42 (L) 4.4 - 5.9 10e12/L    Hemoglobin 8.5 (L) 13.3 - 17.7 g/dL    Hematocrit 24.2 (L) 40.0 - 53.0 %     78 - 100 fl    MCH 35.1 (H) 26.5 - 33.0 pg    MCHC 35.1 31.5 - 36.5 g/dL    RDW 15.4 (H) 10.0 - 15.0 %    Platelet Count 60 (L) 150 - 450 10e9/L    Diff Method Automated Method     % Neutrophils 63.9 %    % Lymphocytes 14.8 %    % Monocytes 16.2 %    % Eosinophils 4.7 %    % Basophils 0.0 %    % Immature Granulocytes 0.4 %    Nucleated RBCs 0 0 /100    Absolute Neutrophil 1.8 1.6 - 8.3 10e9/L    Absolute Lymphocytes 0.4 (L) 0.8 - 5.3 10e9/L    Absolute Monocytes 0.5 0.0 - 1.3 10e9/L    Absolute Eosinophils 0.1 0.0 - 0.7 10e9/L    Absolute Basophils 0.0 0.0 - 0.2 10e9/L    Abs Immature Granulocytes 0.0 0 - 0.4 10e9/L    Absolute Nucleated RBC 0.0    Comprehensive metabolic panel (BMP + Alb, Alk Phos, ALT, AST, Total. Bili, TP)   Result Value Ref Range    Sodium 141 133 - 144 mmol/L    Potassium 4.0 3.4 - 5.3 mmol/L    Chloride 111 (H) 94 - 109 mmol/L    Carbon Dioxide 24 20 - 32 mmol/L    Anion Gap 6 3 - 14 mmol/L    Glucose 97 70 - 99 mg/dL    Urea Nitrogen 9 7 - 30 mg/dL    Creatinine 0.88 0.66 - 1.25 mg/dL    GFR Estimate >90 >60 mL/min/[1.73_m2]    GFR Estimate If Black  >90 >60 mL/min/[1.73_m2]    Calcium 8.7 8.5 - 10.1 mg/dL    Bilirubin Total 0.8 0.2 - 1.3 mg/dL    Albumin 3.9 3.4 - 5.0 g/dL    Protein Total 6.6 (L) 6.8 - 8.8 g/dL    Alkaline Phosphatase 48 40 - 150 U/L    ALT 40 0 - 70 U/L    AST 23 0 - 45 U/L   INR   Result Value Ref Range    INR 1.21 (H) 0.86 - 1.14   Partial thromboplastin time   Result Value Ref Range    PTT 34 22 - 37 sec     Pending: BMB to be completed today.     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 Post transplant complications include C. diff diarrhea (resolved), hx of rash secondary to voriconazole (resolved) and now new onset rash of about , and poor oral nutrition ( on TPN/IL).    BMT:   High risk B cell ALL (CNS negative) + JAK2 activation/BMT: bone marrow biopsy prior to transplant (10/15) MRD 0.006%.   Conditioning per 2015-29: TBI days -4 through -1 followed by matched sibling donor transplant on 11/2   Engrafted day +15, PB VNTR 100% donor, unseparated Day +28 (11/27) bone marrow biopsy, 20-30% donor with trilineage hematopoesis, flow & FISH negative, 100% donor.  Chromosomes negative. We will do his BMB today. His jakafi still remains on hold and this plan will be determined at a later date with our BMT team and his oncology team.       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 Is tapering his tacrolimus by 10% weekly (it will be off on 3/15/19).     FEN/Renal:  Risk for malnutrition: continue minimal oral intake and on TPN/IL with increased volume due to creatinine, cycled over 12 hours. Off marinol now. Is off TPN/IL and weight has remained stable in the last month.     Pulmonary:  History of pneumomediastinum: finding upon work up Chest CT (10/22), asymptomatic. Per radiology, likely benign, perhaps transient. No issues.      Cardiovascular: Aaron-Parkinson-White Syndrome: diagnosed upon syncope in 02/2018. Asymptomatic, no interventions to date. Work up EKG  c/w WPW, sinus bradycardia at 49 bpm. Work up ECHO normal with EF 68%.     Heme:  Pancytopenia secondary to chemotherapy-resolved. However over the last 2 weeks (also since starting Jakafi) his counts have dropped.  Per report from [facility] his WBC 3.3, hgb 9.3, plt 23, Cr 1.11 and Bili 0.9    Today hgb 8.5 and plt are 60.     No need for infusions today.  Did discuss results with Dr. Diaz.        Infectious Disease: Continue prophylaxis with valtrex, bactrim, and fluconazole.     GI:   Reflux precaution: has been on BID protonix.  Recommending to decrease to daily starting today. Nausea management: continue kytril.       Derm: History of rash secondary to voriconazole- resolved.   Now new onset of rash along cheek, jaw line, forehead and behind ears.  Has been using hydrocortisone cream without much relief.  We were able to get him into Dermatology clinic today - we appreciate the teams input in this rash.     Social-He can go out at low-traffic times. And he can drive.     2/8/19: He is cleared for sedation and procedure as planned - BMB and Galvan line removal.    ~1345 Dad came to clinic sharing that Isael is in the Derm clinic and his eyes are very dry and hurting him.  He shares that his eyes have been itchy and a little dry feeling the last 2 days but nothing like they are since he woke up from the procedure.  Dad will go to outpatient pharmacy to obtain artificial tears.  If continues will consider optho consult as well.      Disposition: His care has been transferred back to Dr. Paniagua at [facility]. His Jakafi/ruxolitinib are still on hold.  He will follow up with dermatology today and see Dr. Zavala next week.     Judy Malone APRN, CNP  Pediatric Nurse Practitioner  Pediatric Blood and Marrow Transplant  [phone] - Pager  [phone] - BMT workroom  [phone] - BMT Clinic

## 2019-02-08 NOTE — PROCEDURES
BMT Bone Marrow Biopsy Procedure Note  February 8, 2019 2:53 PM    DIAGNOSIS: B cell ALL     PROCEDURE: Unilateral Bone Marrow Biopsy and Unilateral Aspirate    SITE: Pediatric Sedation Suite    Patient s identification was positively verified by verbal identification and invasive procedure safety checklist was completed.  Informed consent was obtained. Following the administration of propofol as sedation, patient was placed in the  left lateral decubitus position and prepped and draped in a sterile manner.  Approximately 3 cc of 1% Lidocaine was used over the right posterior iliac spine.  Following this a 3 mm incision was made. Trephine bone marrow core(s) was (were) obtained from the [facility]. Bone marrow aspirates were obtained from the [facility]. Aspirates were sent for morphology, immunophenotyping, cytogenetics, molecular diagnostics RFLP and DNA process and hold.  A total of approximately 27 ml of marrow was aspirated.  Following this procedure a sterile dressing was applied to the bone marrow biopsy site(s). The patient was placed in the supine position to maintain pressure on the biopsy site. Post-procedure wound care instructions were given. The patient tolerated the procedure well with no discomfort.  Complications: None. Core and aspirate each obtained on first attempt.    Procedure performed by: BENJA Chen  [facility]  Pediatric Blood and Marrow Transplant  [phone]  Pager  [phone]  [facility]  [phone]  [facility] workroom

## 2019-02-08 NOTE — PROCEDURES
Interventional Radiology Brief Post Procedure Note    Procedure: IR CVC TUNNEL REMOVAL RIGHT [[id number]]    Proceduralist: Kamaljit Gómez PA-C    Assistant: None    Time Out: Prior to the start of the procedure and with procedural staff participation, I verbally confirmed the patient s identity using two indicators, relevant allergies, that the procedure was appropriate and matched the consent or emergent situation, and that the correct equipment/implants were available. Immediately prior to starting the procedure I conducted the Time Out with the procedural staff and re-confirmed the patient s name, procedure, and site/side. (The Joint Commission universal protocol was followed.)  Yes    Sedation: Monitored Anesthesia Care (MAC) administered and documented by Anesthesia Care Provider    Findings: Completed removal of 9.5 Hebrew, double lumen tunneled central venous catheter in its entirety via right IJ initially placed 10/26/18 for BMT which was no longer needed.     Estimated Blood Loss: Minimal    Fluoroscopy Time: None    Specimens: None    Complications: 1. None     Condition: Stable    Plan: Keep head elevated for next 2 hours. No strenuous activity for 3 days. Follow up per primary team.     Comments: See dictated procedure note for full details.    Kamaljit Gómez PA-C  Interventional Radiology  [phone]

## 2019-02-08 NOTE — NURSING NOTE
Chief Complaint   Patient presents with     New Patient     new patient visit for facial rash     Flavia Hamm, CMA

## 2019-02-08 NOTE — ANESTHESIA CARE TRANSFER NOTE
Patient: Artemio Nino    Procedure(s):  BIOPSY BONE MARROW  Tunneled line removal    Diagnosis: ALL  Diagnosis Additional Information: No value filed.    Anesthesia Type:   No value filed.     Note:  Airway :Nasal Cannula  Patient transferred to:PACU  Comments: To PS Recovery with 02, Spont RR. Monitors applied, VSS, IV/airway Patent, Report to RN all questions/concerns answered.  Handoff Report: Identifed the Patient, Identified the Reponsible Provider, Reviewed the pertinent medical history, Discussed the surgical course, Reviewed Intra-OP anesthesia mangement and issues during anesthesia, Set expectations for post-procedure period and Allowed opportunity for questions and acknowledgement of understanding      Vitals: (Last set prior to Anesthesia Care Transfer)    CRNA VITALS  2/8/2019 1128 - 2/8/2019 1206      2/8/2019             NIBP:  92/44    Pulse:  69    Temp:  37.3  C (99.1  F)    SpO2:  100 %    Resp Rate (observed):  16                Electronically Signed By: SNOW Piper CRNA  February 8, 2019  12:06 PM

## 2019-02-08 NOTE — PROGRESS NOTES
Pediatric Dermatology Return Patient Visit  February 8, 2019       Dermatology problem list  1. Dermatitis--34 days post transplant, neck, trunk, and distal arms  -Bx'ed 12/6/178, unsure if GVHD or a different eczematous process  -voriconazole D/c'd in December.     Referring Physician: HANNAH Santos  CC: New rash over the past 3 weeks  HPI: Isael is a 18 year old male with a hx of B-cell  days s/p sibling BMT seen at the request of Candido Han urgently today for a predominantly facial rash. As you know, Isael had a rash back in December which was more eczematous in natures. He was seen by my colleague Dr. Wang who was concerned regarding possible drug eruption versus GVHD. A biopsy was done in December which had mostly features of drug reaction. He was on voriconazole, and given the distribution it was felt that this was the most likely etiology. His vori was d/c'd and he was transitioned to fluconazole and the prior rash resolved (eczematous patches on neck and chest)    Three weeks ago he was started on Jakifi (ruxilitinib) which seemed to be the starting factor of the rash. It was thought that the fluconazole increased the potency of the jakifi and so this was held 2 weeks ago.     Isael presents today with his mother for a 3 week rash that is worsening on the face, it is itchy - and he has dry eyes. He is using HC lotion right now but even water causes burning and stinging. Also on ears and neck, but not on the body. His eyes feel irritated, the eyelids on the inside are burning and painful, itchy. He washes with water on his face once daily.      Past Medical/Surgical History: : is  negative  for surgeries or significant illness. There is no medical history of migraine, seizure, mood disorder, chemical dependency, cardiovascular, lung or gastrointestinal disease, back or joint problems or skin problems.  No history of eczema      Family History:   Family History   No family history on file.     Social  History:        Social History   Substance Use Topics     Smoking status: Never Smoker     Smokeless tobacco: Never Used     Alcohol use Not on file      Medications:       Current Outpatient Prescriptions   Medication     dronabinol (MARINOL) 2.5 MG capsule     emollient (VANICREAM) cream     granisetron (KYTRIL) 1 MG tablet     hydrOXYzine (ATARAX) 10 MG/5ML syrup     LORazepam (ATIVAN) 1 MG tablet     melatonin 3 MG tablet     mycophenolate (GENERIC EQUIVALENT) 500 MG tablet     oxyCODONE (ROXICODONE) 5 MG tablet     pantoprazole (PROTONIX) 40 MG EC tablet     sulfamethoxazole-trimethoprim (BACTRIM DS/SEPTRA DS) 800-160 MG per tablet     tacrolimus (GENERIC EQUIVALENT) 1 mg/mL suspension     triamcinolone (KENALOG) 0.1 % external ointment     valACYclovir (VALTREX) 1000 mg tablet     voriconazole (VFEND) 200 MG tablet     acetaminophen (TYLENOL) 325 MG tablet     hydrocortisone 1 % ointment      No current facility-administered medications for this visit.       Allergies:            Allergies   Allergen Reactions     Blood Transfusion Related (Informational Only) Other (See Comments)       Patient has a history of a clinically significant antibody against RBC antigens.  A delay in compatible RBCs may occur.Stem cell transplant patient.  Give type O RBCs.     No Known Drug Allergies        ROS: a 10 point review of systems including constitutional, HEENT, CV, GI, musculoskeletal, Neurologic, Endocrine, Respiratory, Hematologic and Allergic/Immunologic was performed and was negative except for GI upset. And itchy skin  Physical examination:   There were no vitals taken for this visit.  General: Well-developed, well-nourished in no apparent distress  Eyes: lids, conjunctivae normal  Neck: supple  Respiratory: breathing comfortably  Cardiovascular: Well-perfused without edema or varicosities  Abdomen: non distended  Psychiatric: normal mood and affect  Extremities: No clubbing or cyanosis, nails normal  Skin: Skin  examination of the scalp, face, neck, chest, abdomen, back, right and left arms, hands, fingers, ears, eyelids, and lips. This was notable for:  -facial eczematous plaques involving the entire face and periorbital     Skin scraping positive for demodex - + for several mites                              Assessment:  1. Demodex reaction  Multiple demodex mites were evident on skin scraping and oil microscopy today. Given the timing post BMT and his current immunosuppression, hypersensitivity to demodex is the most likely etiology. I recommended a course of oral ivermecting at 200mcrograms/kg x 1 dose to be repeated in 7 days. In addition. protopic oint 0.1% bid to the face and eyelids will be helpful for the inflammatory reaction. Gentle cleansing of the head and neck daily and liberal vaseline or aquaphor as an emollient will be helpful.     I warned that there may be temporary desquamation following the oral ivermectin, as this has been reported in the literature and experienced by other patients.         Follow up in 4 weeks, sooner prn.       Thank you for this consultation.     Abdirizak Delacruz MD  , Dermatology & Pediatrics  [facility]  [facility], 12th Floor  [facility] [place] [address] [phone] (clinic phone)  [phone] (fax)

## 2019-02-08 NOTE — PROGRESS NOTES
History  Chief Complaint   Patient presents with    Overdose - Accidental     Pt states that she took 3 200 mg Metoprolol tablets because she felt her heart racing  61 y/o female presents with EMS after having taken 200mg x 3 tablets a total of 600mg of metoprolol long acting 2 hours prior to arrival to the ED  She said she took those because she felt palpitations and anxious  She also losartan, xanax,plavix and other medications as listed in her chart which she took the prescribed amount earlier in the day  She denies LOC, near syncope, chest pain, shortness of breath, weakness, nausea, vomiting or any symptoms  She denies suicidal ideaiton, or attempt and denies taking any alcohol or other illicit drugs  History provided by:  Patient and EMS personnel   used: No        Prior to Admission Medications   Prescriptions Last Dose Informant Patient Reported? Taking?    ALPRAZolam (XANAX) 0 5 mg tablet 12/26/2017 at Unknown time  No Yes   Sig: Take 1 tablet by mouth 3 (three) times a day as needed for anxiety   DULoxetine HCl (CYMBALTA PO) Past Week at Unknown time Self Yes Yes   Sig: Take 90 mg by mouth daily   atorvastatin (LIPITOR) 80 mg tablet   Yes No   Sig: Take 80 mg by mouth daily   clopidogrel (PLAVIX) 75 mg tablet 12/26/2017 at Unknown time  No Yes   Sig: Take 1 tablet by mouth daily   gabapentin (NEURONTIN) 600 MG tablet Past Week at Unknown time Self Yes Yes   Sig: Take 300 mg by mouth 3 (three) times a day   gemfibrozil (LOPID) 600 mg tablet 12/26/2017 at Unknown time  Yes Yes   Sig: Take 600 mg by mouth 2 (two) times a day before meals   losartan (COZAAR) 25 mg tablet 12/26/2017 at Unknown time Self Yes Yes   Sig: Take 50 mg by mouth daily     metoprolol tartrate (LOPRESSOR) 100 mg tablet 12/26/2017 at Unknown time Self Yes Yes   Sig: Take 200 mg by mouth daily     naproxen (NAPROSYN) 500 mg tablet 12/26/2017 at Unknown time  No Yes   Sig: Take 1 tablet by mouth 2 (two) Seen in pediatric sedation suite for bone marrow biopsy and aspirate. See procedure note dated 2/8/2019.    BENJA Chen  [facility]  Pediatric Blood and Marrow Transplant  [phone]  Pager  [phone]  [facility]  [phone]  [facility] workroom     times a day with meals   pantoprazole (PROTONIX) 40 mg tablet 2017 at Unknown time  Yes Yes   Sig: Take 40 mg by mouth daily      Facility-Administered Medications: None       Past Medical History:   Diagnosis Date    Back injury     Bell's palsy     Chronic pain     back    Closed left arm fracture     Fibromyalgia     GERD (gastroesophageal reflux disease)     Hyperlipidemia     Hypertension     Lyme disease     Myocardial infarction     Cardiac catheterization 2 years ago showed 30% blockage in 1 of the blood vessels       Past Surgical History:   Procedure Laterality Date     SECTION      CHOLECYSTECTOMY         Family History   Problem Relation Age of Onset    Heart attack Mother     Heart attack Father      I have reviewed and agree with the history as documented  Social History   Substance Use Topics    Smoking status: Current Every Day Smoker     Packs/day: 1 00     Years: 40 00     Types: Cigarettes    Smokeless tobacco: Never Used      Comment: Counseled, wants to quit    Alcohol use Yes      Comment: occasional        Review of Systems   All other systems reviewed and are negative  Physical Exam  ED Triage Vitals   Temperature Pulse Respirations Blood Pressure SpO2   17   97 7 °F (36 5 °C) 63 18 119/60 96 %      Temp Source Heart Rate Source Patient Position - Orthostatic VS BP Location FiO2 (%)   17 --   Tympanic Monitor Sitting Right arm       Pain Score       17       No Pain           Orthostatic Vital Signs  Vitals:    17 0331 17 0910 17 1122 17 1607   BP: 134/65 145/72 145/70 (!) 183/84   Pulse: 60 79 78 73   Patient Position - Orthostatic VS: Lying Lying Lying Sitting       Physical Exam   Constitutional: She is oriented to person, place, and time  She appears well-developed and well-nourished     HENT: Head: Normocephalic and atraumatic  Eyes: EOM are normal  Pupils are equal, round, and reactive to light  Neck: Normal range of motion  Neck supple  Cardiovascular: Normal rate and regular rhythm  Pulmonary/Chest: Effort normal and breath sounds normal    Abdominal: Soft  Bowel sounds are normal    Musculoskeletal: Normal range of motion  Neurological: She is alert and oriented to person, place, and time  Skin: Skin is warm and dry  Psychiatric: She has a normal mood and affect  Nursing note and vitals reviewed        ED Medications  Medications   sodium chloride 0 9 % bolus 1,000 mL (0 mL Intravenous Hold 12/26/17 2244)   ALPRAZolam (XANAX) tablet 0 5 mg (0 5 mg Oral Given 12/28/17 1507)   atorvastatin (LIPITOR) tablet 80 mg (80 mg Oral Given 12/28/17 1657)   clopidogrel (PLAVIX) tablet 75 mg (75 mg Oral Given 12/28/17 0910)   DULoxetine (CYMBALTA) delayed release capsule 60 mg (60 mg Oral Given 12/27/17 2117)   gabapentin (NEURONTIN) capsule 300 mg (300 mg Oral Given 12/28/17 1657)   gemfibrozil (LOPID) tablet 600 mg (600 mg Oral Given 12/28/17 1657)   naproxen (NAPROSYN) tablet 500 mg (500 mg Oral Given 12/28/17 1657)   nicotine (NICODERM CQ) 14 mg/24hr TD 24 hr patch 1 patch (1 patch Transdermal Medication Applied 12/28/17 0915)   heparin (porcine) subcutaneous injection 5,000 Units (5,000 Units Subcutaneous Not Given 12/28/17 1513)   acetaminophen (TYLENOL) tablet 650 mg (not administered)   pantoprazole (PROTONIX) EC tablet 40 mg (40 mg Oral Given 12/28/17 0523)   sodium chloride 0 9 % bolus 1,000 mL (1,000 mL Intravenous New Bag 12/26/17 2237)   sodium chloride 0 9 % bolus 1,000 mL (1,000 mL Intravenous New Bag 12/27/17 0139)       Diagnostic Studies  Results Reviewed     Procedure Component Value Units Date/Time    TSH, 3rd generation [[id number]]  (Normal) Collected:  12/26/17 2132    Lab Status:  Final result Specimen:  Blood from Arm, Right Updated:  12/27/17 0240     TSH 3RD Shahram Kirby 0 360 uIU/mL     Narrative:         Patients undergoing fluorescein dye angiography may retain small amounts of fluorescein in the body for 48-72 hours post procedure  Samples containing fluorescein can produce falsely depressed TSH values  If the patient had this procedure,a specimen should be resubmitted post fluorescein clearance  The recommended reference ranges for TSH during pregnancy are as follows:  First trimester 0 1 to 2 5 uIU/mL  Second trimester  0 2 to 3 0 uIU/mL  Third trimester 0 3 to 3 0 uIU/m      Fingerstick Glucose (POCT) [[id number]]  (Normal) Collected:  12/26/17 2334    Lab Status:  Final result Updated:  12/26/17 2337     POC Glucose 95 mg/dl     Salicylate level [[id number]]  (Normal) Collected:  12/26/17 2240    Lab Status:  Final result Specimen:  Blood Updated:  16/73/39 7170     Salicylate Lvl 4 6 mg/dL     Acetaminophen level [[id number]]  (Abnormal) Collected:  12/26/17 2132    Lab Status:  Final result Specimen:  Blood from Arm, Right Updated:  12/26/17 2238     Acetaminophen Level <2 0 (L) ug/mL     Troponin I [[id number]]  (Normal) Collected:  12/26/17 2132    Lab Status:  Final result Specimen:  Blood from Arm, Right Updated:  12/26/17 2202     Troponin I <0 02 ng/mL     Narrative:         [employer] Chemistry analyzer 99% cutoff is > 0 04 ng/mL in network labs    o cTnI 99% cutoff is useful only when applied to patients in the clinical setting of myocardial ischemia  o cTnI 99% cutoff should be interpreted in the context of clinical history, ECG findings and possibly cardiac imaging to establish correct diagnosis  o cTnI 99% cutoff may be suggestive but clearly not indicative of a coronary event without the clinical setting of myocardial ischemia      Comprehensive metabolic panel [[id number]]  (Abnormal) Collected:  12/26/17 2132    Lab Status:  Final result Specimen:  Blood from Arm, Right Updated:  12/26/17 2157     Sodium 144 mmol/L      Potassium 3 5 mmol/L      Chloride 107 mmol/L      CO2 26 mmol/L      Anion Gap 11 mmol/L      BUN 16 mg/dL      Creatinine 0 98 mg/dL      Glucose 107 mg/dL      Calcium 8 1 (L) mg/dL      AST 9 U/L      ALT 20 U/L      Alkaline Phosphatase 70 U/L      Total Protein 6 9 g/dL      Albumin 3 6 g/dL      Total Bilirubin 0 20 mg/dL      eGFR 65 ml/min/1 73sq m     Narrative:         National Kidney Disease Education Program recommendations are as follows:  GFR calculation is accurate only with a steady state creatinine  Chronic Kidney disease less than 60 ml/min/1 73 sq  meters  Kidney failure less than 15 ml/min/1 73 sq  meters  Protime-INR [[id number]]  (Normal) Collected:  12/26/17 2132    Lab Status:  Final result Specimen:  Blood from Arm, Right Updated:  12/26/17 2155     Protime 10 8 seconds      INR 1 03    APTT [[id number]]  (Normal) Collected:  12/26/17 2132    Lab Status:  Final result Specimen:  Blood from Arm, Right Updated:  12/26/17 2155     PTT 24 seconds     Narrative:          Therapeutic Heparin Range = 60-90 seconds    Ethanol [[id number]]  (Abnormal) Collected:  12/26/17 2132    Lab Status:  Final result Specimen:  Blood from Arm, Right Updated:  12/26/17 2154     Ethanol Lvl 137 (H) mg/dL     CBC and differential [[id number]]  (Abnormal) Collected:  12/26/17 2132    Lab Status:  Final result Specimen:  Blood from Arm, Right Updated:  12/26/17 2141     WBC 9 10 Thousand/uL      RBC 4 81 Million/uL      Hemoglobin 14 8 g/dL      Hematocrit 43 5 %      MCV 91 fL      MCH 30 9 pg      MCHC 34 1 g/dL      RDW 13 8 %      MPV 7 5 (L) fL      Platelets 532 Thousands/uL      nRBC 0 /100 WBCs      Neutrophils Relative 40 (L) %      Lymphocytes Relative 47 (H) %      Monocytes Relative 7 %      Eosinophils Relative 5 %      Basophils Relative 1 %      Neutrophils Absolute 3 70 Thousands/µL      Lymphocytes Absolute 4 30 Thousands/µL      Monocytes Absolute 0 60 Thousand/µL      Eosinophils Absolute 0 50 Thousand/µL      Basophils Absolute 0 10 Thousands/µL                  X-ray chest 1 view portable   Final Result by Shama Camarillo MD (12/27 5965)      No active pulmonary disease  Workstation performed: [id number]                    Procedures  ECG 12 Lead Documentation  Performed by: Linda Fields by: Stephanie Nelson     ECG reviewed by me, the ED Provider: yes    Patient location:  ED  Previous ECG:     Previous ECG:  Unavailable    Comparison to cardiac monitor: Yes    Rate:     ECG rate assessment: normal    Rhythm:     Rhythm: sinus rhythm    Ectopy:     Ectopy: none    QRS:     QRS axis:  Normal  Conduction:     Conduction: normal    ST segments:     ST segments:  Normal  T waves:     T waves: normal             Phone Contacts  ED Phone Contact    ED Course  ED Course                                MDM  Number of Diagnoses or Management Options  Drug overdose:   Diagnosis management comments: Patient evaluated with toxicology screening which was unremarkable  Labs, negative  She was give IV fluids  She remained hemodynamically stable in the ED with normal vitals and asymptomatic in the ED  I contacted poison control and they recommended supportive care  Patient admitted to the hospitalist for observation  Patient updated on ED course,results and plan of treatment and she verbalized understanding         Amount and/or Complexity of Data Reviewed  Clinical lab tests: ordered and reviewed  Tests in the radiology section of CPT®: ordered and reviewed  Tests in the medicine section of CPT®: ordered and reviewed    Patient Progress  Patient progress: stable    CritCare Time    Disposition  Final diagnoses:   Drug overdose     Time reflects when diagnosis was documented in both MDM as applicable and the Disposition within this note     Time User Action Codes Description Comment    12/26/2017 11:22 PM Florence Jimenez Add [T50 901A] Drug overdose     12/26/2017 11:53 PM Marbella Feldman [M79 7] Fibromyalgia     12/26/2017 11:53 PM Madison Monreal Modify [M79 7] Fibromyalgia       ED Disposition     ED Disposition Condition Comment    Admit           Follow-up Information    None       Discharge Medication List as of 12/28/2017  5:32 PM      START taking these medications    Details   nicotine (NICODERM CQ) 14 mg/24hr TD 24 hr patch Place 1 patch on the skin daily, Starting Fri 12/29/2017, Normal         CONTINUE these medications which have CHANGED    Details   ALPRAZolam (XANAX) 0 5 mg tablet Take 1 tablet by mouth 3 (three) times a day as needed for anxiety for up to 3 days, Starting u 12/28/2017, Until Sun 12/31/2017, Print         CONTINUE these medications which have NOT CHANGED    Details   atorvastatin (LIPITOR) 80 mg tablet Take 80 mg by mouth daily, Until Discontinued, Historical Med      clopidogrel (PLAVIX) 75 mg tablet Take 1 tablet by mouth daily, Starting u 12/14/2017, Print      DULoxetine HCl (CYMBALTA PO) Take 90 mg by mouth daily, Historical Med      gabapentin (NEURONTIN) 600 MG tablet Take 300 mg by mouth 3 (three) times a day, Historical Med      gemfibrozil (LOPID) 600 mg tablet Take 600 mg by mouth 2 (two) times a day before meals, Until Discontinued, Historical Med      losartan (COZAAR) 25 mg tablet Take 50 mg by mouth daily  , Historical Med      metoprolol tartrate (LOPRESSOR) 100 mg tablet Take 200 mg by mouth daily  , Historical Med      naproxen (NAPROSYN) 500 mg tablet Take 1 tablet by mouth 2 (two) times a day with meals, Starting Sat 12/23/2017, Print      pantoprazole (PROTONIX) 40 mg tablet Take 40 mg by mouth daily, Historical Med             Outpatient Discharge Orders  Activity as tolerated         ED Provider  Electronically Signed by           Basia Monroy DO  12/28/17 1927

## 2019-02-11 LAB
COPATH REPORT: NORMAL

## 2019-02-13 ENCOUNTER — TELEPHONE (OUTPATIENT)
Dept: DERMATOLOGY | Facility: CLINIC | Age: 19
End: 2019-02-13

## 2019-02-13 DIAGNOSIS — L70.8 DEMODEX ACNE: Primary | ICD-10-CM

## 2019-02-13 LAB
COPATH REPORT: NORMAL
COPATH REPORT: NORMAL

## 2019-02-13 NOTE — TELEPHONE ENCOUNTER
Spoke with patient's mother who states that overall Artemio is doing very well. His rash is clear. Mom states however, that Artemio's eyes are really red, itchy and painful in the inner corner of the eye. Mom is inquiring if they should get some sort of eye drop to help with this. RN explained that the information would be routed to Dr. Delacruz and that advisement may not be received until tomorrow. Mom in agreement. RN also assisted in making a follow up appt for 4 weeks. RN suggested continuing the protopic even if no rash is present until follow up.

## 2019-02-13 NOTE — TELEPHONE ENCOUNTER
----- Message from Marium Thomas sent at 2/13/2019  9:53 AM CST -----  Regarding:  medication   Is an  Needed: no  If yes, Which Language: no  Callers Name: Mervat Villaseñor Phone Number: [phone]  Relationship to Patient: mom  Best time of day to call: anytime  Is it ok to leave a detailed voicemail on this number: yes  Reason for Call: Pt's mom is calling stating that the medication that was given is working great however pt's is still having issues with his eye so wondering if the can get a prescription for some eye drops.  Medication Question(if no, do not complete additional questions):  Name of Medication: eye drops  Name of Pharmacy(include location): [facility] [address] [place] [address] [place] [address] [phone] (Phone)  [phone] (Fax)  Is this a Refill Request: no

## 2019-02-14 RX ORDER — TRIAMCINOLONE ACETONIDE 0.25 MG/G
OINTMENT TOPICAL
Qty: 30 G | Refills: 0 | Status: SHIPPED | OUTPATIENT
Start: 2019-02-14 | End: 2019-05-24

## 2019-02-14 NOTE — TELEPHONE ENCOUNTER
I don't have an actual eyedrop recommendation - so sorry about that!  However are they getting the protopic into the corner of the eye?   If that is not providing relief then perhaps a steroid ointment might do the trick. I will give a prescription for triam 0.025% oint for a few days in a row it is safe to use this on the eyelids. A saline eyedrop is safe and if his eyes feel dry this could help.   If its only the eyelid skin and not the actual eye/conjunctive, I would give this a try. If the eye is red or there are visual changes than maybe ophthalmology would be helpful, espeicially if the steroid is not helpful.

## 2019-02-14 NOTE — TELEPHONE ENCOUNTER
RN relayed message to parent from Dr. Delacruz. Mom verbalized understanding and states that she will remind Artemio to make sure to get the protopic in the corner of the eye. Mom states that his face is looking great but he now has some redness on the backs of his hands and on the top of his knees. RN explained that she can use the protopic or triamcinolone ointment to those areas and use good moisturization. Mom denies further questions.

## 2019-02-15 ENCOUNTER — INFUSION THERAPY VISIT (OUTPATIENT)
Dept: INFUSION THERAPY | Facility: CLINIC | Age: 19
End: 2019-02-15
Attending: PEDIATRICS
Payer: COMMERCIAL

## 2019-02-15 ENCOUNTER — ONCOLOGY VISIT (OUTPATIENT)
Dept: TRANSPLANT | Facility: CLINIC | Age: 19
End: 2019-02-15
Attending: PEDIATRICS
Payer: COMMERCIAL

## 2019-02-15 VITALS
RESPIRATION RATE: 26 BRPM | WEIGHT: 131.17 LBS | DIASTOLIC BLOOD PRESSURE: 76 MMHG | OXYGEN SATURATION: 98 % | BODY MASS INDEX: 19.43 KG/M2 | HEART RATE: 99 BPM | SYSTOLIC BLOOD PRESSURE: 109 MMHG | TEMPERATURE: 98.9 F | HEIGHT: 69 IN

## 2019-02-15 DIAGNOSIS — Z78.9 ON TOTAL PARENTERAL NUTRITION: ICD-10-CM

## 2019-02-15 DIAGNOSIS — C91.01 ACUTE LYMPHOBLASTIC LEUKEMIA (ALL) IN REMISSION (H): Primary | ICD-10-CM

## 2019-02-15 DIAGNOSIS — Z94.81 S/P ALLOGENEIC BONE MARROW TRANSPLANT (H): ICD-10-CM

## 2019-02-15 DIAGNOSIS — C91.01 ACUTE LYMPHOBLASTIC LEUKEMIA (ALL) IN REMISSION (H): ICD-10-CM

## 2019-02-15 DIAGNOSIS — R11.0 NAUSEA: ICD-10-CM

## 2019-02-15 DIAGNOSIS — Z94.81 S/P ALLOGENEIC BONE MARROW TRANSPLANT (H): Primary | ICD-10-CM

## 2019-02-15 LAB
ALBUMIN SERPL-MCNC: 4.1 G/DL (ref 3.4–5)
ALP SERPL-CCNC: 109 U/L (ref 40–150)
ALT SERPL W P-5'-P-CCNC: 169 U/L (ref 0–70)
ANION GAP SERPL CALCULATED.3IONS-SCNC: 7 MMOL/L (ref 3–14)
AST SERPL W P-5'-P-CCNC: 57 U/L (ref 0–45)
BASOPHILS # BLD AUTO: 0 10E9/L (ref 0–0.2)
BASOPHILS NFR BLD AUTO: 0.4 %
BILIRUB SERPL-MCNC: 0.8 MG/DL (ref 0.2–1.3)
BUN SERPL-MCNC: 15 MG/DL (ref 7–30)
CALCIUM SERPL-MCNC: 9.1 MG/DL (ref 8.5–10.1)
CD19 CELLS # BLD: 122 CELLS/UL (ref 107–698)
CD19 CELLS NFR BLD: 27 % (ref 6–27)
CD3 CELLS # BLD: 211 CELLS/UL (ref 603–2990)
CD3 CELLS NFR BLD: 47 % (ref 49–84)
CD3+CD4+ CELLS # BLD: 172 CELLS/UL (ref 441–2156)
CD3+CD4+ CELLS NFR BLD: 38 % (ref 28–63)
CD3+CD4+ CELLS/CD3+CD8+ CLL BLD: 4.22 % (ref 1.4–2.6)
CD3+CD8+ CELLS # BLD: 39 CELLS/UL (ref 125–1312)
CD3+CD8+ CELLS NFR BLD: 9 % (ref 10–40)
CD3-CD16+CD56+ CELLS # BLD: 116 CELLS/UL (ref 95–640)
CD3-CD16+CD56+ CELLS NFR BLD: 26 % (ref 4–25)
CHLORIDE SERPL-SCNC: 108 MMOL/L (ref 94–109)
CO2 SERPL-SCNC: 24 MMOL/L (ref 20–32)
CREAT SERPL-MCNC: 1.02 MG/DL (ref 0.66–1.25)
DIFFERENTIAL METHOD BLD: ABNORMAL
EOSINOPHIL # BLD AUTO: 0.1 10E9/L (ref 0–0.7)
EOSINOPHIL NFR BLD AUTO: 2.8 %
ERYTHROCYTE [DISTWIDTH] IN BLOOD BY AUTOMATED COUNT: 17.5 % (ref 10–15)
GFR SERPL CREATININE-BSD FRML MDRD: >90 ML/MIN/{1.73_M2}
GLUCOSE SERPL-MCNC: 127 MG/DL (ref 70–99)
HCT VFR BLD AUTO: 30.2 % (ref 40–53)
HGB BLD-MCNC: 10.2 G/DL (ref 13.3–17.7)
IFC SPECIMEN: ABNORMAL
IMM GRANULOCYTES # BLD: 0 10E9/L (ref 0–0.4)
IMM GRANULOCYTES NFR BLD: 0.4 %
LYMPHOCYTES # BLD AUTO: 0.6 10E9/L (ref 0.8–5.3)
LYMPHOCYTES NFR BLD AUTO: 22.1 %
MAGNESIUM SERPL-MCNC: 2 MG/DL (ref 1.6–2.3)
MCH RBC QN AUTO: 34.9 PG (ref 26.5–33)
MCHC RBC AUTO-ENTMCNC: 33.8 G/DL (ref 31.5–36.5)
MCV RBC AUTO: 103 FL (ref 78–100)
MONOCYTES # BLD AUTO: 0.4 10E9/L (ref 0–1.3)
MONOCYTES NFR BLD AUTO: 16.9 %
NEUTROPHILS # BLD AUTO: 1.4 10E9/L (ref 1.6–8.3)
NEUTROPHILS NFR BLD AUTO: 57.4 %
NRBC # BLD AUTO: 0 10*3/UL
NRBC BLD AUTO-RTO: 0 /100
PHOSPHATE SERPL-MCNC: 3.6 MG/DL (ref 2.5–4.5)
PLATELET # BLD AUTO: 94 10E9/L (ref 150–450)
POTASSIUM SERPL-SCNC: 4.1 MMOL/L (ref 3.4–5.3)
PROT SERPL-MCNC: 7.5 G/DL (ref 6.8–8.8)
RBC # BLD AUTO: 2.92 10E12/L (ref 4.4–5.9)
SODIUM SERPL-SCNC: 139 MMOL/L (ref 133–144)
WBC # BLD AUTO: 2.5 10E9/L (ref 4–11)

## 2019-02-15 PROCEDURE — 84100 ASSAY OF PHOSPHORUS: CPT | Performed by: NURSE PRACTITIONER

## 2019-02-15 PROCEDURE — 82784 ASSAY IGA/IGD/IGG/IGM EACH: CPT | Performed by: NURSE PRACTITIONER

## 2019-02-15 PROCEDURE — 86360 T CELL ABSOLUTE COUNT/RATIO: CPT | Performed by: PEDIATRICS

## 2019-02-15 PROCEDURE — 85025 COMPLETE CBC W/AUTO DIFF WBC: CPT | Performed by: PEDIATRICS

## 2019-02-15 PROCEDURE — G0463 HOSPITAL OUTPT CLINIC VISIT: HCPCS | Mod: ZF

## 2019-02-15 PROCEDURE — 86359 T CELLS TOTAL COUNT: CPT | Performed by: PEDIATRICS

## 2019-02-15 PROCEDURE — 81268 CHIMERISM ANAL W/CELL SELECT: CPT | Performed by: NURSE PRACTITIONER

## 2019-02-15 PROCEDURE — 85025 COMPLETE CBC W/AUTO DIFF WBC: CPT | Performed by: NURSE PRACTITIONER

## 2019-02-15 PROCEDURE — 86355 B CELLS TOTAL COUNT: CPT | Performed by: PEDIATRICS

## 2019-02-15 PROCEDURE — 86357 NK CELLS TOTAL COUNT: CPT | Performed by: PEDIATRICS

## 2019-02-15 PROCEDURE — 80053 COMPREHEN METABOLIC PANEL: CPT | Performed by: NURSE PRACTITIONER

## 2019-02-15 PROCEDURE — 81268 CHIMERISM ANAL W/CELL SELECT: CPT | Performed by: PEDIATRICS

## 2019-02-15 PROCEDURE — 36591 DRAW BLOOD OFF VENOUS DEVICE: CPT

## 2019-02-15 PROCEDURE — 25000128 H RX IP 250 OP 636: Mod: ZF

## 2019-02-15 PROCEDURE — 83735 ASSAY OF MAGNESIUM: CPT | Performed by: NURSE PRACTITIONER

## 2019-02-15 RX ORDER — PANTOPRAZOLE SODIUM 40 MG/1
40 TABLET, DELAYED RELEASE ORAL
Qty: 30 TABLET | Refills: 3 | COMMUNITY
Start: 2019-02-15 | End: 2020-07-15

## 2019-02-15 RX ORDER — HEPARIN SODIUM (PORCINE) LOCK FLUSH IV SOLN 100 UNIT/ML 100 UNIT/ML
5 SOLUTION INTRAVENOUS
Status: DISCONTINUED | OUTPATIENT
Start: 2019-02-15 | End: 2019-02-15 | Stop reason: HOSPADM

## 2019-02-15 RX ORDER — HEPARIN SODIUM (PORCINE) LOCK FLUSH IV SOLN 100 UNIT/ML 100 UNIT/ML
SOLUTION INTRAVENOUS
Status: COMPLETED
Start: 2019-02-15 | End: 2019-02-15

## 2019-02-15 RX ADMIN — Medication 5 ML: at 11:36

## 2019-02-15 RX ADMIN — HEPARIN SODIUM (PORCINE) LOCK FLUSH IV SOLN 100 UNIT/ML 5 ML: 100 SOLUTION at 11:36

## 2019-02-15 ASSESSMENT — MIFFLIN-ST. JEOR: SCORE: 1593.76

## 2019-02-15 NOTE — PROGRESS NOTES
Artemio was added to infusion schedule for port access and labs. Patient's port was accessed using sterile technique. Blood return noted and labs drawn as ordered. Port flushed, heparin locked and de-accessed.

## 2019-02-15 NOTE — PROGRESS NOTES
February 15, 2019    Dear Dr. Paniagua,    We had the pleasure of seeing Artemio Nino in our Pediatric [facility] Clinic today. 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 He is here today with his family for his day+100 visit.     Since we last saw Isael about 5 weeks ago he was seen by dermatology on 2/8/19 for a new rash that was determined to be secondary to demodex reaction. Multiple demodex mites were evident on skin scraping and oil microscopy. He is on his last day of oral ivermectin and has been using topical tacrolimus cream to affected areas with resolution of facial rash and continued improvement of rash on his hands, elbows, and knees. TCM ointment was to be used if the rash was not improving with protopic but he has not needed it. He has not had any fevers, infections, or hospital admissions in the interim. His central line was removed on 2/8/19 without complication. He continues on his tacrolimus taper without evidence of GVHD. He was started on oral Jakafi but this has been on hold for a couple weeks due to suppressed counts, which are now recovering. He feels his appetite is at ~90% of his baseline however in the last week he has been experiencing an increase in heartburn symptoms prompting him to increase his protonix from once a day to twice a day. His heartburn is typically worst in the mornings and causes him to vomit. On these days his oral intake is much less. If he doesn't wake up with heartburn he states his appetite and oral intake are great. He is drinking about 4 bottles of water/day. He denies nausea, diarrhea, or abdominal pain. He has started working out about 1h/day. He does get tired easily but this continues to improve. He also states that he gets dry mouth only when he works out that resolves with drinking water. He is sleeping well at night.       Review of Systems:  "Pertinent positives include those mentioned in interval events. A complete review of systems was performed and is otherwise negative.       Family History: Unchanged from previous visit    Social History: He will be moving back in with his grandmother.     Medications:  Please see MAR     Physical Exam:  /76 (BP Location: Left arm, Patient Position: Fowlers, Cuff Size: Adult Regular)   Pulse 99   Temp 98.9  F (37.2  C) (Oral)   Resp 26   Ht 1.75 m (5' 8.9\")   Wt 59.5 kg (131 lb 2.8 oz)   SpO2 98%   BMI 19.43 kg/m    FEN: Sitting on exam table. Awake and alert and in no apparent distress.  Mother and step-mother present.  HEENT: Hair regrowth, normocephalic, atraumatic, sclera clear, MMM, no oral lesions, nares patent without discharge  CARD: Heart regular rate and rhythm. No murmurs, rubs or gallops. Cap refill 2 seconds.  RESP: Lungs clear in upper lobes, diminished in bases, shallow respirations.  No increased work of breathing, crackles or wheezes.   ABD: Non-distended, non-tender, no organomegaly  EXTREM: No edema noted  SKIN: Faint rash on dorsum of hands, elbows, and knees     Labs:    Results for orders placed or performed in visit on 02/15/19   **Comprehensive metabolic panel FUTURE anytime   Result Value Ref Range    Sodium 139 133 - 144 mmol/L    Potassium 4.1 3.4 - 5.3 mmol/L    Chloride 108 94 - 109 mmol/L    Carbon Dioxide 24 20 - 32 mmol/L    Anion Gap 7 3 - 14 mmol/L    Glucose 127 (H) 70 - 99 mg/dL    Urea Nitrogen 15 7 - 30 mg/dL    Creatinine 1.02 0.66 - 1.25 mg/dL    GFR Estimate >90 >60 mL/min/[1.73_m2]    GFR Estimate If Black >90 >60 mL/min/[1.73_m2]    Calcium 9.1 8.5 - 10.1 mg/dL    Bilirubin Total 0.8 0.2 - 1.3 mg/dL    Albumin 4.1 3.4 - 5.0 g/dL    Protein Total 7.5 6.8 - 8.8 g/dL    Alkaline Phosphatase 109 40 - 150 U/L     (H) 0 - 70 U/L    AST 57 (H) 0 - 45 U/L   CBC with platelets differential   Result Value Ref Range    WBC 2.5 (L) 4.0 - 11.0 10e9/L    RBC Count " 2.92 (L) 4.4 - 5.9 10e12/L    Hemoglobin 10.2 (L) 13.3 - 17.7 g/dL    Hematocrit 30.2 (L) 40.0 - 53.0 %     (H) 78 - 100 fl    MCH 34.9 (H) 26.5 - 33.0 pg    MCHC 33.8 31.5 - 36.5 g/dL    RDW 17.5 (H) 10.0 - 15.0 %    Platelet Count 94 (L) 150 - 450 10e9/L    Diff Method Automated Method     % Neutrophils 57.4 %    % Lymphocytes 22.1 %    % Monocytes 16.9 %    % Eosinophils 2.8 %    % Basophils 0.4 %    % Immature Granulocytes 0.4 %    Nucleated RBCs 0 0 /100    Absolute Neutrophil 1.4 (L) 1.6 - 8.3 10e9/L    Absolute Lymphocytes 0.6 (L) 0.8 - 5.3 10e9/L    Absolute Monocytes 0.4 0.0 - 1.3 10e9/L    Absolute Eosinophils 0.1 0.0 - 0.7 10e9/L    Absolute Basophils 0.0 0.0 - 0.2 10e9/L    Abs Immature Granulocytes 0.0 0 - 0.4 10e9/L    Absolute Nucleated RBC 0.0    Magnesium   Result Value Ref Range    Magnesium 2.0 1.6 - 2.3 mg/dL   Phosphorus   Result Value Ref Range    Phosphorus 3.6 2.5 - 4.5 mg/dL     Pending: CMV, PB chimerism, T cell subset, IgG      2/8/19 Unilateral Bone Marrow Biopsy/Aspiration  FINAL DIAGNOSIS:   Bone marrow, posterior iliac crest, right decalcified trephine biopsy and touch imprint; right particle crush, direct aspirate smear, and concentrated aspirate smear; and peripheral blood smear:       - No morphologic or immunophenotypic evidence of B-lymphoblastic leukemia     - Variable marrow cellularity (overall 10-20%) with erythroid predominant trilineage hematopoiesis and no increase in blasts     - Peripheral blood showing pancytopenia     - 100% donor    - See comment     COMMENT:   Concurrent flow cytometry ([id number]) showed no abnormal B-lymphoblasts.         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 Post transplant complications include C. diff diarrhea (resolved), rash secondary to voriconazole (resolved), and rash secondary to demodex mites (resolving with current treatment), and reflux (on protonix). His  day+100 evaluations show his leukemia remains in remission and he is 100% donor. He is scheduled to complete his tacrolimus taper on 3/15/19.     BMT:   High risk B cell ALL (CNS negative) + JAK2 activation/BMT: bone marrow biopsy prior to transplant (10/15) MRD 0.006%.   Conditioning per 2015-29: TBI days -4 through -1 followed by matched sibling donor transplant on 11/2   Engrafted day +15, PB VNTR 100% donor, unseparated Day +28 (11/27) bone marrow biopsy, 20-30% cellularity with trilineage hematopoesis, flow & FISH negative, 100% donor.  Chromosomes negative. Day+100 (2/8/19) bone marrow biopsy 10-20% cellularity with erythroid predominant trilineage hematopoiesis, morphology and flow negative for blasts (FISH and cytogenetics not completed given no evidence of leukemia), 100% donor. He will have his next bone marrow biopsy and donor studies on day+180, 1 year and 2 years post transplant. He will not routinely need LPs.          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 Tacrolimus taper scheduled to end on 3/15/19.     Heme:  Pancytopenia secondary to Jakifi. Counts are improving off Jakafi. Would restart 65 mg BID when platelets >100K consistently.      FEN/Renal:  Risk for malnutrition: Appetite is improved and is off TPN.    GI:   Reflux: Continue protonix twice daily. Isael can trial going back to once daily when his reflux symptoms are well-controlled.      Pulmonary:  History of pneumomediastinum: finding upon work up Chest CT (10/22), asymptomatic. Per radiology, likely benign, perhaps transient. No issues.      Cardiovascular: Aaron-Parkinson-White Syndrome: diagnosed upon syncope in 02/2018. Asymptomatic, no interventions to date. Work up EKG c/w WPW, sinus bradycardia at 49 bpm. Work up ECHO normal with EF 68%.       Infectious Disease: Isael can discontinue valtrex and fluconazole prophylaxis. He should continue bactrim prophylaxis until 1 year post transplant. He received his Flu shot  1/3/19.      Derm: Demodex reaction: Today is last day of ivermectin. Continue topicals as recommended by dermatology.      Disposition: RTC in 3 months for his day+180 bone marrow, labs, and exam with Dr. Zavala.       Sincerely,     Viky Zavala MD, PhD    Pediatric Blood and Marrow Transplant  [facility]    Written by Zeynep Melara DO  Pediatric Blood and Marrow Transplant Fellow  [facility]  Pager [phone]    I, Viky Zavala MD PhD, saw this patient with the fellow and agree with the fellow s findings and plan of care as documented in the fellow s note.

## 2019-02-15 NOTE — LETTER
2/15/2019    RE: Artemio Nino  [address] [address]     January 3, 2019    Dear Dr. Paniagua,    We had the pleasure of seeing Artemio Nino in our Pediatric Blood and Marrow Transplant Clinic today. 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 He is here today with his family for his day+100 visit.     Since we last saw Isael about 5 weeks ago he was seen by dermatology on 2/8/19 for a new rash that was determined to be secondary to demodex reaction. Multiple demodex mites were evident on skin scraping and oil microscopy. He is on his last day of oral ivermectin and has been using topical tacrolimus cream to affected areas with resolution of facial rash and continued improvement of rash on his hands, elbows, and knees. TCM ointment was to be used if the rash was not improving with protopic but he has not needed it. He has not had any fevers, infections, or hospital admissions in the interim. His central line was removed on 2/8/19 without complication. He continues on his tacrolimus taper without evidence of GVHD. He was started on oral Jakafi but this has been on hold for a couple weeks due to suppressed counts, which are now recovering. He feels his appetite is at ~90% of his baseline however in the last week he has been experiencing an increase in heartburn symptoms prompting him to increase his protonix from once a day to twice a day. His heartburn is typically worst in the mornings and causes him to vomit. On these days his oral intake is much less. If he doesn't wake up with heartburn he states his appetite and oral intake are great. He is drinking about 4 bottles of water/day. He denies nausea, diarrhea, or abdominal pain. He has started working out about 1h/day. He does get tired easily but this continues to improve. He also states that he gets dry mouth only when he works out that resolves  "with drinking water. He is sleeping well at night.       Review of Systems: Pertinent positives include those mentioned in interval events. A complete review of systems was performed and is otherwise negative.       Family History: Unchanged from previous visit    Social History: He will be moving back in with his grandmother.     Medications:  Please see MAR     Physical Exam:  /76 (BP Location: Left arm, Patient Position: Fowlers, Cuff Size: Adult Regular)   Pulse 99   Temp 98.9  F (37.2  C) (Oral)   Resp 26   Ht 1.75 m (5' 8.9\")   Wt 59.5 kg (131 lb 2.8 oz)   SpO2 98%   BMI 19.43 kg/m     FEN: Sitting on exam table. Awake and alert and in no apparent distress.  Mother and step-mother present.  HEENT: Hair regrowth, normocephalic, atraumatic, sclera clear, MMM, no oral lesions, nares patent without discharge  CARD: Heart regular rate and rhythm. No murmurs, rubs or gallops. Cap refill 2 seconds.  RESP: Lungs clear in upper lobes, diminished in bases, shallow respirations.  No increased work of breathing, crackles or wheezes.   ABD: Non-distended, non-tender, no organomegaly  EXTREM: No edema noted  SKIN: Faint rash on dorsum of hands, elbows, and knees     Labs:    Results for orders placed or performed in visit on 02/15/19   **Comprehensive metabolic panel FUTURE anytime   Result Value Ref Range    Sodium 139 133 - 144 mmol/L    Potassium 4.1 3.4 - 5.3 mmol/L    Chloride 108 94 - 109 mmol/L    Carbon Dioxide 24 20 - 32 mmol/L    Anion Gap 7 3 - 14 mmol/L    Glucose 127 (H) 70 - 99 mg/dL    Urea Nitrogen 15 7 - 30 mg/dL    Creatinine 1.02 0.66 - 1.25 mg/dL    GFR Estimate >90 >60 mL/min/[1.73_m2]    GFR Estimate If Black >90 >60 mL/min/[1.73_m2]    Calcium 9.1 8.5 - 10.1 mg/dL    Bilirubin Total 0.8 0.2 - 1.3 mg/dL    Albumin 4.1 3.4 - 5.0 g/dL    Protein Total 7.5 6.8 - 8.8 g/dL    Alkaline Phosphatase 109 40 - 150 U/L     (H) 0 - 70 U/L    AST 57 (H) 0 - 45 U/L   CBC with platelets " differential   Result Value Ref Range    WBC 2.5 (L) 4.0 - 11.0 10e9/L    RBC Count 2.92 (L) 4.4 - 5.9 10e12/L    Hemoglobin 10.2 (L) 13.3 - 17.7 g/dL    Hematocrit 30.2 (L) 40.0 - 53.0 %     (H) 78 - 100 fl    MCH 34.9 (H) 26.5 - 33.0 pg    MCHC 33.8 31.5 - 36.5 g/dL    RDW 17.5 (H) 10.0 - 15.0 %    Platelet Count 94 (L) 150 - 450 10e9/L    Diff Method Automated Method     % Neutrophils 57.4 %    % Lymphocytes 22.1 %    % Monocytes 16.9 %    % Eosinophils 2.8 %    % Basophils 0.4 %    % Immature Granulocytes 0.4 %    Nucleated RBCs 0 0 /100    Absolute Neutrophil 1.4 (L) 1.6 - 8.3 10e9/L    Absolute Lymphocytes 0.6 (L) 0.8 - 5.3 10e9/L    Absolute Monocytes 0.4 0.0 - 1.3 10e9/L    Absolute Eosinophils 0.1 0.0 - 0.7 10e9/L    Absolute Basophils 0.0 0.0 - 0.2 10e9/L    Abs Immature Granulocytes 0.0 0 - 0.4 10e9/L    Absolute Nucleated RBC 0.0    Magnesium   Result Value Ref Range    Magnesium 2.0 1.6 - 2.3 mg/dL   Phosphorus   Result Value Ref Range    Phosphorus 3.6 2.5 - 4.5 mg/dL     Pending: CMV, PB chimerism, T cell subset, IgG    2/8/19 Unilateral Bone Marrow Biopsy/Aspiration  FINAL DIAGNOSIS:   Bone marrow, posterior iliac crest, right decalcified trephine biopsy and touch imprint; right particle crush, direct aspirate smear, and concentrated aspirate smear; and peripheral blood smear:       - No morphologic or immunophenotypic evidence of B-lymphoblastic leukemia     - Variable marrow cellularity (overall 10-20%) with erythroid predominant trilineage hematopoiesis and no increase in blasts     - Peripheral blood showing pancytopenia     - 100% donor    - See comment     COMMENT:   Concurrent flow cytometry ([id number]) showed no abnormal B-lymphoblasts.   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 Post transplant complications include C. diff diarrhea (resolved), rash secondary to voriconazole (resolved), and rash secondary to demodex  mites (resolving with current treatment), and reflux (on protonix). His day+100 evaluations show his leukemia remains in remission and he is 100% donor. He is scheduled to complete his tacrolimus taper on 3/15/19.     BMT:   High risk B cell ALL (CNS negative) + JAK2 activation/BMT: bone marrow biopsy prior to transplant (10/15) MRD 0.006%.   Conditioning per 2015-29: TBI days -4 through -1 followed by matched sibling donor transplant on 11/2   Engrafted day +15, PB VNTR 100% donor, unseparated Day +28 (11/27) bone marrow biopsy, 20-30% cellularity with trilineage hematopoesis, flow & FISH negative, 100% donor.  Chromosomes negative. Day+100 (2/8/19) bone marrow biopsy 10-20% cellularity with erythroid predominant trilineage hematopoiesis, morphology and flow negative for blasts (FISH and cytogenetics not completed given no evidence of leukemia), 100% donor. He will have his next bone marrow biopsy and donor studies on day+180, 1 year and 2 years post transplant. He will not routinely need LPs.          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 Tacrolimus taper scheduled to end on 3/15/19.     Heme:  Pancytopenia secondary to Jakifi. Counts are improving off Jakafi. Would restart 65 mg BID when platelets >100K consistently.      FEN/Renal:  Risk for malnutrition: Appetite is improved and is off TPN.    GI:   Reflux: Continue protonix twice daily. Isael can trial going back to once daily when his reflux symptoms are well-controlled.      Pulmonary:  History of pneumomediastinum: finding upon work up Chest CT (10/22), asymptomatic. Per radiology, likely benign, perhaps transient. No issues.      Cardiovascular: Aaron-Parkinson-White Syndrome: diagnosed upon syncope in 02/2018. Asymptomatic, no interventions to date. Work up EKG c/w WPW, sinus bradycardia at 49 bpm. Work up ECHO normal with EF 68%.       Infectious Disease: Isael can discontinue valtrex and fluconazole prophylaxis. He should continue bactrim  prophylaxis until 1 year post transplant. He received his Flu shot 1/3/19.      Derm: Demodex reaction: Today is last day of ivermectin. Continue topicals as recommended by dermatology.    Disposition: RTC in 3 months for his day+180 bone marrow, labs, and exam with Dr. Zavala.       Sincerely,     Viky Zavala MD, PhD    Pediatric Blood and Marrow Transplant  [facility]    Written by Zeynep Melara DO  Pediatric Blood and Marrow Transplant Fellow  [facility]  Pager [phone]    I, Viky Zavala MD PhD, saw this patient with the fellow and agree with the fellow s findings and plan of care as documented in the fellow s note.  Viky Zavala MD

## 2019-02-15 NOTE — PATIENT INSTRUCTIONS
Return to [facility] for labs and exam with Viky Zavala for day +180 evaluation to be scheduled by complex BMT schedulers.   Infusion needs: none  Patient has port-a-cath line, to be drawn off of per lab.   Medication changes: stop fluconazole and valacyclovir   Care plan changes: [facility] (family to schedule)   Contact information  During business hours (7:30am-4:30pm):   To leave a non-urgent voicemail: call triage line [phone]    To call for time-sensitive needs or concerns : call clinic  [phone]  Evenings after 4:30pm, weekends, and holidays:   For any needs or concerns: call for BMT fellow at [phone] in the case of an emergency  Thank you!     InEashmartet message sent the BMT Complex Schedulers for follow up as of 2/18/19 at 8:40am sLL

## 2019-02-15 NOTE — NURSING NOTE
"Chief Complaint   Patient presents with     RECHECK     Patient is here today for ALL follow up     /76 (BP Location: Left arm, Patient Position: Fowlers, Cuff Size: Adult Regular)   Pulse 99   Temp 98.9  F (37.2  C) (Oral)   Resp 26   Ht 1.75 m (5' 8.9\")   Wt 59.5 kg (131 lb 2.8 oz)   SpO2 98%   BMI 19.43 kg/m      Donna Andrews LPN  February 15, 2019  "

## 2019-02-18 LAB — IGG SERPL-MCNC: 918 MG/DL (ref 695–1620)

## 2019-02-19 ENCOUNTER — TELEPHONE (OUTPATIENT)
Dept: GASTROENTEROLOGY | Facility: CLINIC | Age: 19
End: 2019-02-19

## 2019-02-19 LAB
COPATH REPORT: NORMAL
COPATH REPORT: NORMAL

## 2019-02-19 NOTE — TELEPHONE ENCOUNTER
Procedure: EGD                               Recommended by: Dr. Eric      Called Prnts w/ schedule YES, Spoke with Camryn-Step Mom   Pre-op NO, In chart   W/ directions (prep/eating guidelines/location) YES, 2/19  Mailed info/map YES, e-mailed, mom, dad, step mom and pt 2/19  Admission NO  Calendar YES, 2/19  Orders done YES,   OR schedule YES, Sowmya VILLAGRAN scheduled 2/19   NO,   Prescription, NO,       Scheduled: APPOINTMENT DATE:_Friday February 22nd in Peds Sedation with Dr. Hobson_______            ARRIVAL TIME: _1000______    Anesthesia NPO guidelines         Yeimy Jones    II

## 2019-02-21 ENCOUNTER — ANESTHESIA EVENT (OUTPATIENT)
Dept: PEDIATRICS | Facility: CLINIC | Age: 19
End: 2019-02-21
Payer: COMMERCIAL

## 2019-02-22 ENCOUNTER — ANESTHESIA (OUTPATIENT)
Dept: PEDIATRICS | Facility: CLINIC | Age: 19
End: 2019-02-22
Payer: COMMERCIAL

## 2019-02-22 ENCOUNTER — HOSPITAL ENCOUNTER (OUTPATIENT)
Facility: CLINIC | Age: 19
Discharge: HOME | End: 2019-02-22
Attending: PEDIATRICS | Admitting: PEDIATRICS
Payer: COMMERCIAL

## 2019-02-22 VITALS
WEIGHT: 132.94 LBS | DIASTOLIC BLOOD PRESSURE: 61 MMHG | BODY MASS INDEX: 19.69 KG/M2 | RESPIRATION RATE: 22 BRPM | TEMPERATURE: 98.2 F | HEART RATE: 65 BPM | SYSTOLIC BLOOD PRESSURE: 95 MMHG | OXYGEN SATURATION: 100 %

## 2019-02-22 LAB
Lab: NORMAL
Lab: NORMAL
UPPER GI ENDOSCOPY: NORMAL

## 2019-02-22 PROCEDURE — 88305 TISSUE EXAM BY PATHOLOGIST: CPT | Mod: 26 | Performed by: PEDIATRICS

## 2019-02-22 PROCEDURE — 88305 TISSUE EXAM BY PATHOLOGIST: CPT | Performed by: PEDIATRICS

## 2019-02-22 PROCEDURE — 37000008 ZZH ANESTHESIA TECHNICAL FEE, 1ST 30 MIN: Performed by: PEDIATRICS

## 2019-02-22 PROCEDURE — 25000128 H RX IP 250 OP 636: Performed by: NURSE ANESTHETIST, CERTIFIED REGISTERED

## 2019-02-22 PROCEDURE — 87252 VIRUS INOCULATION TISSUE: CPT | Performed by: PEDIATRICS

## 2019-02-22 PROCEDURE — 25800030 ZZH RX IP 258 OP 636: Performed by: NURSE ANESTHETIST, CERTIFIED REGISTERED

## 2019-02-22 PROCEDURE — 87798 DETECT AGENT NOS DNA AMP: CPT | Performed by: PEDIATRICS

## 2019-02-22 PROCEDURE — 43239 EGD BIOPSY SINGLE/MULTIPLE: CPT | Performed by: PEDIATRICS

## 2019-02-22 PROCEDURE — 40001011 ZZH STATISTIC PRE-PROCEDURE NURSING ASSESSMENT: Performed by: PEDIATRICS

## 2019-02-22 PROCEDURE — 87498 ENTEROVIRUS PROBE&REVRS TRNS: CPT | Performed by: PEDIATRICS

## 2019-02-22 PROCEDURE — 25000128 H RX IP 250 OP 636

## 2019-02-22 PROCEDURE — 87496 CYTOMEG DNA AMP PROBE: CPT | Performed by: PEDIATRICS

## 2019-02-22 PROCEDURE — 40000165 ZZH STATISTIC POST-PROCEDURE RECOVERY CARE: Performed by: PEDIATRICS

## 2019-02-22 RX ORDER — ONDANSETRON 2 MG/ML
4 INJECTION INTRAMUSCULAR; INTRAVENOUS EVERY 30 MIN PRN
Status: CANCELLED | OUTPATIENT
Start: 2019-02-22

## 2019-02-22 RX ORDER — PROPOFOL 10 MG/ML
INJECTION, EMULSION INTRAVENOUS PRN
Status: DISCONTINUED | OUTPATIENT
Start: 2019-02-22 | End: 2019-02-22

## 2019-02-22 RX ORDER — ONDANSETRON 2 MG/ML
INJECTION INTRAMUSCULAR; INTRAVENOUS PRN
Status: DISCONTINUED | OUTPATIENT
Start: 2019-02-22 | End: 2019-02-22

## 2019-02-22 RX ORDER — ACETAMINOPHEN 325 MG/1
650 TABLET ORAL
Status: CANCELLED | OUTPATIENT
Start: 2019-02-22

## 2019-02-22 RX ORDER — LIDOCAINE HYDROCHLORIDE 20 MG/ML
INJECTION, SOLUTION INFILTRATION; PERINEURAL PRN
Status: DISCONTINUED | OUTPATIENT
Start: 2019-02-22 | End: 2019-02-22

## 2019-02-22 RX ORDER — SODIUM CHLORIDE, SODIUM LACTATE, POTASSIUM CHLORIDE, CALCIUM CHLORIDE 600; 310; 30; 20 MG/100ML; MG/100ML; MG/100ML; MG/100ML
INJECTION, SOLUTION INTRAVENOUS CONTINUOUS PRN
Status: DISCONTINUED | OUTPATIENT
Start: 2019-02-22 | End: 2019-02-22

## 2019-02-22 RX ORDER — PROPOFOL 10 MG/ML
INJECTION, EMULSION INTRAVENOUS CONTINUOUS PRN
Status: DISCONTINUED | OUTPATIENT
Start: 2019-02-22 | End: 2019-02-22

## 2019-02-22 RX ORDER — HEPARIN SODIUM (PORCINE) LOCK FLUSH IV SOLN 100 UNIT/ML 100 UNIT/ML
SOLUTION INTRAVENOUS
Status: COMPLETED
Start: 2019-02-22 | End: 2019-02-22

## 2019-02-22 RX ORDER — CITRIC ACID/SODIUM CITRATE 334-500MG
30 SOLUTION, ORAL ORAL ONCE
Status: DISCONTINUED | OUTPATIENT
Start: 2019-02-22 | End: 2019-02-22 | Stop reason: HOSPADM

## 2019-02-22 RX ADMIN — ONDANSETRON 4 MG: 2 INJECTION INTRAMUSCULAR; INTRAVENOUS at 11:26

## 2019-02-22 RX ADMIN — SODIUM CHLORIDE, POTASSIUM CHLORIDE, SODIUM LACTATE AND CALCIUM CHLORIDE: 600; 310; 30; 20 INJECTION, SOLUTION INTRAVENOUS at 11:26

## 2019-02-22 RX ADMIN — PROPOFOL 30 MG: 10 INJECTION, EMULSION INTRAVENOUS at 11:28

## 2019-02-22 RX ADMIN — PROPOFOL 70 MG: 10 INJECTION, EMULSION INTRAVENOUS at 11:26

## 2019-02-22 RX ADMIN — Medication 500 UNITS: at 12:14

## 2019-02-22 RX ADMIN — PROPOFOL 300 MCG/KG/MIN: 10 INJECTION, EMULSION INTRAVENOUS at 11:26

## 2019-02-22 NOTE — ANESTHESIA POSTPROCEDURE EVALUATION
Anesthesia POST Procedure Evaluation    Patient: Artemio Nino   MRN:     [id number] Gender:   male   Age:    18 year old :      2000        Preoperative Diagnosis: nausea, vomiting   Procedure(s):  Upper endoscopy with biopsy   Postop Comments: No value filed.       Anesthesia Type:  General    Reportable Event: NO     PAIN: Uncomplicated   Sign Out status: Comfortable, Well controlled pain     PONV: No PONV   Sign Out status:  No Nausea or Vomiting     Neuro/Psych: Uneventful perioperative course   Sign Out Status: Preoperative baseline; Age appropriate mentation     Airway/Resp.: Uneventful perioperative course   Sign Out Status: Non labored breathing, age appropriate RR     CV: Uneventful perioperative course   Sign Out status: Appropriate BP and perfusion indices; Appropriate HR/Rhythm     Disposition:   Sign Out in:  PACU  Disposition:  Phase II; Home  Recovery Course: Uneventful  Follow-Up: Not required     Comments/Narrative:  Patient comfortable. Only mild nausea, baseline or lower. No questions regarding anesthesia.           Last Anesthesia Record Vitals:  CRNA VITALS  2019 1106 - 2019 1206      2019             Temp:  36.3  C (97.3  F)    SpO2:  99 %    Resp Rate (observed):  22    EKG:  Sinus rhythm          Last PACU/Preop Vitals:  Vitals:    19 1145 19 1155 19 1209   BP: (!) 87/52 (!) 83/53 95/61   Pulse: 65     Resp:    Temp:  36.4  C (97.6  F) 36.8  C (98.2  F)   SpO2: 100% 100% 100%         Electronically Signed By: Yesenia Wallace MD, 2019, 3:59 PM

## 2019-02-22 NOTE — DISCHARGE INSTRUCTIONS
Pediatric Discharge Instructions after Upper Endoscopy (EGD)    An upper endoscopy is a test that shows the inside of the upper gastrointestinal (GI) tract.  This includes the esophagus, stomach and duodenum (first part of the small intestine).  The doctor can perform a biopsy (take tissue samples), check for problems or remove objects.    Activity and Diet:    You were given medicine for sedation during the procedure.  You may be dizzy or sleepy for the rest of the day.       Do not drive any motorized vehicles or operate any potentially hazardous equipment until tomorrow.       Do not make important decisions or sign documents today.       You may return to your regular diet today if clear liquids do not upset your stomach.       You may restart your medications on discharge unless your doctor has instructed you differently.       Do not participate in contact sports, gymnastic or other complex movements requiring coordination to prevent injury until tomorrow.       You may return to school or  tomorrow.    After your test:      It is common to see streaks of blood in your saliva the next 1-2 days if biopsies were taken.    You may have a sore throat for 2 to 3 days.  It may help to:       Drink cool liquids and avoid hot liquids today.       Use sore throat lozenges.       Gargle for about 10 seconds as needed with salt water up to 4 times a day.  To make salt water, mix 1 cup of warm water with 1 teaspoon of salt and stir until salt is dissolved.  Spit out salt after gargling.  Do Not Swallow.    If your esophagus was dilated (opened) or banded during the procedure:       Drink only cool liquids for the rest of the day.  Eat a soft diet such as macaroni and cheese or soup for the next 2 days.       You may have a sore chest for 2 to 3 days.       You may take Tylenol (acetaminophen) for pain unless your doctor has told you not to.    Do not take aspirin or ibuprofen (Advil, Motrin) or other NSAIDS  (Anti-inflammatory drugs) until your doctor gives you permission.    Follow-Up:       If we took small tissue samples for study and you do not have a follow-up visit scheduled, the doctor may call you or your results will be mailed to you in 10-14 days.      When to call us:    Problems are rare.    Call [phone] and ask for the Pediatric GI provider on call to be paged right away if you have:      Unusual throat pain or trouble swallowing.       Unusual pain in the belly or chest that is not relieved by belching or passing air.       Black stools (tar-like looking bowel movement).       Temperature above 101 degrees Fahrenheit.    If you vomit blood or have severe pain, go to an emergency room.    For Questions after your procedure: Monday through Friday    Please call:  The Pediatric GI Nurse Coordinator     8:00 a.m. - 4:30 p.m. at [phone].  (We try to answer all messages within 24 hours.)    For Problems after your procedure: After Hours and Weekends      Please call:  The Hospital      at [phone] and ask them to page the Pediatric GI Provider on call.  They will call you back at the number you give the Hospital .    For Scheduling:  Call [phone]                       REV. 11/2015    Home Instructions for Your Child after Sedation  Today your child received (medicine):  Propofol and Zofran  Please keep this form with your health records  Your child may be more sleepy and irritable today than normal. Also, an adult should stay with your child for the rest of the day. The medicine may make the child dizzy. Avoid activities that require balance (bike riding, skating, climbing stairs, walking).  Remember:    When your child wants to eat again, start with liquids (juice, soda pop, Popsicles). If your child feels well enough, you may try a regular diet. It is best to offer light meals for the first 24 hours.    If your child has nausea (feels sick to the stomach) or vomiting (throws  up), give small amounts of clear liquids (7-Up, Sprite, apple juice or broth). Fluids are more important than food until your child is feeling better.    Wait 24 hours before giving medicine that contains alcohol. This includes liquid cold, cough and allergy medicines (Robitussin, Vicks Formula 44 for children, Benadryl, Chlor-Trimeton).    If you will leave your child with a , give the sitter a copy of these instructions.  Call your doctor if:    You have questions about the test results.    Your child vomits (throws up) more than two times.    Your child is very fussy or irritable.    You have trouble waking your child.     If your child has trouble breathing, call [phone].  If you have any questions or concerns, please call:  Pediatric Sedation Unit [phone]  Pediatric clinic  [phone]  [facility]  [phone] (ask for the Pediatric Anesthesiologist on call)  Emergency department [phone]  [facility] toll-free number [phone] (Monday--Friday, 8 a.m. to 4:30 p.m.)  I understand these instructions. I have all of my personal belongings.

## 2019-02-22 NOTE — ANESTHESIA CARE TRANSFER NOTE
Patient: Artemio Nino    Procedure(s):  Upper endoscopy with biopsy    Diagnosis: nausea, vomiting  Diagnosis Additional Information: No value filed.    Anesthesia Type:   No value filed.     Note:  Airway :Nasal Cannula  Patient transferred to: Recovery  Comments: Transfer to patient room for recovery.  Monitors placed.  VSS noted.  Report to RN.  Handoff Report: Identifed the Patient, Identified the Reponsible Provider, Reviewed the pertinent medical history, Discussed the surgical course, Reviewed Intra-OP anesthesia mangement and issues during anesthesia, Set expectations for post-procedure period and Allowed opportunity for questions and acknowledgement of understanding      Vitals: (Last set prior to Anesthesia Care Transfer)    CRNA VITALS  2/22/2019 1106 - 2/22/2019 1138      2/22/2019             Temp:  36.3  C (97.3  F)    SpO2:  99 %    Resp Rate (observed):  22    EKG:  Sinus rhythm                Electronically Signed By: SNOW GOLD CRNA  February 22, 2019  11:38 AM

## 2019-02-24 LAB
CMV DNA SPEC QL NAA+PROBE: NOT DETECTED
EBV DNA SPEC QL NAA+PROBE: DETECTED
SPECIMEN SOURCE: ABNORMAL
SPECIMEN SOURCE: NORMAL

## 2019-02-27 ENCOUNTER — TELEPHONE (OUTPATIENT)
Dept: GASTROENTEROLOGY | Facility: CLINIC | Age: 19
End: 2019-02-27

## 2019-02-27 NOTE — TELEPHONE ENCOUNTER
Returning Dad's call about protonix. Left message for Dad with my contact information.       KEN Moore RNCC

## 2019-02-28 LAB — COPATH REPORT: NORMAL

## 2019-03-05 LAB
SPECIMEN SOURCE: NORMAL
VIRUS SPEC CULT: NORMAL
VIRUS SPEC CULT: NORMAL

## 2019-03-06 ENCOUNTER — TELEPHONE (OUTPATIENT)
Dept: GASTROENTEROLOGY | Facility: CLINIC | Age: 19
End: 2019-03-06

## 2019-03-06 ENCOUNTER — HOSPITAL ENCOUNTER (OUTPATIENT)
Facility: CLINIC | Age: 19
End: 2019-03-06
Attending: PEDIATRICS
Payer: COMMERCIAL

## 2019-03-06 NOTE — TELEPHONE ENCOUNTER
Tried calling Isael's cell phone first. No voice mail set up. Called home number (step-mother) and left message. Biopsy results positive for infection. Need to communicate results with Isael's oncologist at [facility]'s. Requested family contact GI office at [phone] to let us know the name and contact number of his oncologist.     Magdalene Hobson MD

## 2019-03-06 NOTE — TELEPHONE ENCOUNTER
Spoke to Camryn stmom). Artemio has EBV in his stomach tissue and blood. All other pathology results were normal. We will communicate this finding to Dr. Carolina Paniagua (oncologist at [facility]). [phone]. Mom verbalized understanding and will call me with any further questions or concerns.       KEN Moore RNCC

## 2019-03-11 NOTE — PROGRESS NOTES
PEDIATRIC BLOOD & MARROW TRANSPLANT SOCIAL WORK PSYCHOSOCIAL ASSESSMENT                         Date: 10/26/18      Assessment of living situation, support system, financial status, functional status, coping abilities/strategies, stressors, need for resources and other social work interventions.      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      Physician(s): Dr. Viky Zavala    Nurse Coordinator: Daria Jara      Presenting Information:     Artemio is a 18 year old male patient with Acute Lymphoblastic Leukemia (ALL) who is pursuing curative treatment through a bone marrow transplant. Artemio was admitted to the bone marrow transplant unit at the [facility] on [facility] Oct. 28th to begin his pre-transplant conditioning regimen. Artemio's older brother Link will be his matched sibling donor. Link is an adult and therefore he can provide his own consent for marrow donation procedure.       Special Considerations/Accomodations:     Patient's parents Dangelo and Mervat are  but amicable. They are both involved in Artemio's care as sources of support. Dangelo is remarried to his wife Camryn and Mervat has a significant other named Jdaa.  explained to Dangelo and Mervat that the care team will be unable to update multiple family members on a daily basis and recommended they come up with a system to update each other. They decided to trial keeping a notebook in Artemio's room and whoever is there getting updates from the doctor on any given day can take  The Service to Neurology order in workqueue [id number] requested on 1/8/2019 has been removed as, referral in queue > than 30 days. Patient cancelled appointment with Galileo Burnett MD and did not reschedule. Letter mailed to patient with no response.    Please contact patient, if further communication is needed.     This order can be reactivated at any time and made available again for patient scheduling.     notes and leave them there for the next person to read and update as necessary.          Contact/Legal Info:     Decision Maker(s): Artemio is an adult and able to make his own medical decisions at this time. He wants all medical decisions to go through him and not his parents.    Special Custody Considerations: N/A    Advance Directive: Artemio states he completed an advanced directive while undergoing cancer treatment at [facility]. He signed a release of information so his treatment team at [facility] can access a copy.     [address] [place] [address] Lives with  paternal grandmother and paternal grandfather    Local Address: [address] [place] [address]   Staying locally with father and step mother until he is greater than 100 days post BMT.    Phone number(s): Mom - Mervat: [phone]             Dad - Dangelo: [phone]             Step-Mom - Camryn: [phone]        Support System/Relationship Status/Family History: Artemio's parents and paternal grandparents are his primary support system. His parents, Mervat and Dangelo, are  and civil, but prefer to visit Artemio at different times and communicate directly with Artemio versus with each other as much as possible.  Dangelo is remarried and his wife's name is Camryn. Artemio has given verbal permission for his parents and Camryn all to get any information they would like to know. Edwige explained to all of his parents that the medical team will not be able to update multiple family members per day and that they should try to update each other as much as possible.   Artemio also has two siblings, an older sister and brother. His sister Kamilah lives locally in [place], [place] is  and has one child. His brother Link lives in [place], where Mervat's family is from. Mervat had also relocated back to [place] to assist her parents as they aged but has come back to  [place] to assist with the transplant process.   Patient previously had been living in his patnernal grandparents basement. There were some concerns from various family members that this environment was not suitable for a post BMT patient. (reasoning was that it was damp and musty and it was unknown if mold and mildew were present. Fortunately Artemio decided that going back to live at his grandparents house was not a good idea for several reasons, mainly because his grandparents have their own health issues and he didn't want to stress them out by living at their house while also going through post BMT follow up.  Artemio states he does not have a significant other at this time.      Unique Patient/Family Needs:  Establishing effective communication pattern with patient and his family.    Spirituality/Lottie Affiliation: Patient does not identify with lottie community      Communication Preferences: Artemio wants all medical communication and decisions to go through him. He prefers direct to the point communication. He does not like to be bothered with examinations and questions if they're not necessary.        Caregiver Plan: Parents, Artemio's primary care givers will be Mervat Pierson and step-mom Camryn. He will be staying at his dad and step-mom's house after discharge.  continuing to work with Atremio's parents on this plan. Initially it was reported by other family members that Dangelo preferred not to have Mervat at their house when it was her turn to provide care giving duties and that same individual reported that Mervat preferred not to be at Dangelo and Camryn's house. The other most likely option would be for Isael to go back and forth from Dangelo and Camryn's house to Mervat's apartment. Edwige advised them to come up with a plan based on what was best for Artemio, what his preferences are, and what their family can manage.     Patient & Caregiver Knowledge of Medical Condition and Plan of  "Care: Artemio and his family are understanding of his medical condition and plan of care for BMT.    Patient and Caregiver Transplant Goals: Artemio and his parents state they \"Just want to get through it\".        Patient Personality/Communication/Coping/Interests/Activities: approachable, withdrawn and quiet. Patient states that when he doesn't feel good he irish by playing video games, watching movies or TV and sleeping. He also states he's not a morning person and prefers not to be bothered at night or if he's napping. He requested nursing bundle cares when possible. Edwige explained that the nurses try to be as accommodating as possible to patient preferences but it is not always possible due to the large number tasks they need to complete every day with every patient.    Patient Education/Developmental Level: Patient graduated from [employer] approximately a year ago and had been taking some time off before deciding about college. He plans to revisit applying to higher education once he is done with the BMT process.        Logistical Considerations:  Transportation: Private Car  Parking: at this time parents state they can afford to buy monthly parking passes.  Housing: patient and family is local.  Mervat has rented a short term apartment in [place] until Artemio is through transplant and she can return to [place].      Financial: Patient and his parents state that at this time they are able to meet their expenses with their current income. Because he essentially has 3 parents to share the care giving work, they all plan to continue working and therefore are hoping to maintain their current income.    Insurance:   Primary: [employer] [place].  Secondary: [facility]  Unique Issues?:     Patient/Caregiver Sources of Income/Employment: Patient is not employed at this time due to his frequent treatment needs related to his ALL.   Patient's mom, Mervat, is a " tele nurse and has started a short term part time casual position at [employer] in [place]. Patient's Dad, Dangelo, owns his own commercial painting business. Since he's the owner he has some flexibility in his schedule and can be away from work regularly. Patient's step mom, Camryn, is a nurse and works at one of the [employer] in the [employer].  Financial Concerns: none at this time. Artemio and his parents were encouraged to reach out if they had any financial concerns in the future.        Patient/Family Psychosocial Considerations:    Mental Health: No mental health issues identified       Chemical Health: No issues identified      Trauma/Loss/Abuse History: No identified issues      Legal Issues: No issues identified        Clinical Assessment and Recommendations:     Patient and family present as well-informed about and prepared for the treatment process. I did not identify any significant barriers to them managing the demands of treatment.      Concerns: none identified at this time.    Education Provided: Transplant process expectations, Caregiver requirements, Caregiver self-care, Financial issues related to transplant, Financial resources/grants available, Common psychosocial stressors pre/post transplant, Hopsital resources available and Social work role      Interventions Provided:   Education and counseling related to psychosocial issues and resources    Follow up planned:  Psychosocial support    REJI Barnes, [facility]    Pediatric Blood and Marrow Transplant  [phone]  [email or web address]      11/6/2018    3:39 PM

## 2019-03-21 ENCOUNTER — TELEPHONE (OUTPATIENT)
Dept: TRANSPLANT | Facility: CLINIC | Age: 19
End: 2019-03-21

## 2019-04-02 ENCOUNTER — TELEPHONE (OUTPATIENT)
Dept: TRANSPLANT | Facility: CLINIC | Age: 19
End: 2019-04-02

## 2019-04-09 ENCOUNTER — TELEPHONE (OUTPATIENT)
Dept: GASTROENTEROLOGY | Facility: CLINIC | Age: 19
End: 2019-04-09

## 2019-04-10 NOTE — TELEPHONE ENCOUNTER
----- Message from Magdalene Hobson MD sent at 3/6/2019  4:02 PM CST -----  Please fax the endoscopy results including the viral cultures to his oncologist.   Thanks,  Magdalene  ----- Message -----  From: Erinn Moore RN  Sent: 3/6/2019   1:38 PM  To: Magdalene Hobson MD    Spoke to quyen to tell her about the EBV. His oncologist is Dr. Carolina Paniagua at [facility] [phone]. Let me know if anything else is needed for him. Thanks!     Erinn

## 2019-04-30 ENCOUNTER — TELEPHONE (OUTPATIENT)
Dept: DERMATOLOGY | Facility: CLINIC | Age: 19
End: 2019-04-30

## 2019-04-30 DIAGNOSIS — L70.8 DEMODEX ACNE: Primary | ICD-10-CM

## 2019-04-30 RX ORDER — IVERMECTIN 3 MG/1
TABLET ORAL
Qty: 1 TABLET | Refills: 1 | Status: SHIPPED | OUTPATIENT
Start: 2019-04-30 | End: 2019-05-24

## 2019-04-30 RX ORDER — IVERMECTIN 10 MG/G
CREAM TOPICAL
Qty: 60 G | Refills: 1 | Status: SHIPPED | OUTPATIENT
Start: 2019-04-30 | End: 2019-05-24

## 2019-04-30 NOTE — TELEPHONE ENCOUNTER
prescription clarification   Received: Today   Message Contents   Kushal Sparks Rn Jerald             Is an  Needed: no   Callers Name: rep from [facility]   Callers Phone Number: [phone]   Relationship to Patient: pharmacy   Best time of day to call: any   Is it ok to leave a detailed voicemail on this number: yes   Reason for Call:   Pharmacy needs clarification on the prescription. The amount it says to take in the instructions doesn't match up with the amount that's actually prescribed   Medication Question(if no, do not complete additional questions):   Name of Medication: ivermectin/stromectal   Name of Pharmacy(include location): [facility] [address] [facility] [place], [place] - [address]   Is this a Refill Request: no      RN provided a verbal order for the increased dispense qty per instructions written by Dr. Delacruz.     RN relayed information to patient's mother regarding dosing. Mom declines to schedule an appointment at this time. She will call back as they are currently out of [place].

## 2019-04-30 NOTE — TELEPHONE ENCOUNTER
----- Message from Cyril Vargas sent at 4/30/2019  8:41 AM CDT -----  Regarding: Demodex oral medication questions  Contact: [phone]  Is an  Needed: no  If yes, Which Language:    Callers Name: Mervat Hernandez Phone Number: [phone]  Relationship to Patient: Mother  Best time of day to call: any  Is it ok to leave a detailed voicemail on this number: yes  Reason for Call: Can pt take another dose of oral demodex medication? If so, please send to pharmacy and then pt can make a follow up appt, Please call to discuss    Thank you  Cyril

## 2019-04-30 NOTE — TELEPHONE ENCOUNTER
Request routed to Dr. Delacruz for advisement as patient was last seen on 2/8/19. 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 He had improvement and did not follow up in 1 month as advised. Parent is now requesting a second course and then inquiring about following up after taking a 2nd course of ivermectin.

## 2019-04-30 NOTE — TELEPHONE ENCOUNTER
Sent rx for oral ivermectin 4 tabs today and 4 in 7 days for repeat dosing. I also recommend using the topical ivermectin once nightly as a preventive approach. Ihope this will help!  JONATHAN

## 2019-05-02 ENCOUNTER — TELEPHONE (OUTPATIENT)
Dept: DERMATOLOGY | Facility: CLINIC | Age: 19
End: 2019-05-02

## 2019-05-02 DIAGNOSIS — L70.8 DEMODEX ACNE: Primary | ICD-10-CM

## 2019-05-02 NOTE — TELEPHONE ENCOUNTER
Prior Authorization Retail Medication Request    Medication/Dose: ivermectin (SOOLANTRA) 1 % cream  Sig: Apply to the affected areas of the face once daily.  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    Insurance Name:  [facility]  Insurance ID:  [id number]       Pharmacy Information (if different than what is on RX)  Name:  [facility] [id number]  Phone:  [phone]

## 2019-05-03 NOTE — TELEPHONE ENCOUNTER
Central Prior Authorization Team   Phone: [phone]    PA Initiation    Medication: ivermectin (SOOLANTRA) 1 % cream  Insurance Company: [employer] - Phone [phone] Fax [phone]  Pharmacy Filling the Rx: [address] [place] - [address]  Filling Pharmacy Phone: [phone]  Filling Pharmacy Fax:    Start Date: 5/3/2019

## 2019-05-08 NOTE — TELEPHONE ENCOUNTER
PRIOR AUTHORIZATION DENIED    Medication: ivermectin (SOOLANTRA) 1 % cream - denied    Denial Date: 5/3/2019    Denial Rational: script is denied because pt needs to try/fail metronidazole and Rosadan                      Appeal Information: please document once received

## 2019-05-09 RX ORDER — METRONIDAZOLE 7.5 MG/G
GEL TOPICAL 2 TIMES DAILY
Qty: 45 G | Refills: 1 | Status: SHIPPED | OUTPATIENT
Start: 2019-05-09 | End: 2019-05-24

## 2019-05-10 NOTE — TELEPHONE ENCOUNTER
Thanks J - there are two creams that the insurance would like me to try before soolantra. I have prescribed both to cub. They are both metrocream/gel - I think they actually could be helpful, so no downside to maria guadalupe trying these. Then in a couple of months if no better, we can try for sollantra again.   JONATHAN

## 2019-05-14 ENCOUNTER — HOSPITAL ENCOUNTER (OUTPATIENT)
Facility: CLINIC | Age: 19
End: 2019-05-14
Attending: RADIOLOGY | Admitting: RADIOLOGY

## 2019-05-14 ENCOUNTER — OFFICE VISIT (OUTPATIENT)
Dept: DERMATOLOGY | Facility: CLINIC | Age: 19
End: 2019-05-14
Attending: DERMATOLOGY
Payer: COMMERCIAL

## 2019-05-14 VITALS — HEIGHT: 69 IN | BODY MASS INDEX: 20.67 KG/M2 | WEIGHT: 139.55 LBS

## 2019-05-14 DIAGNOSIS — L70.8 DEMODEX ACNE: Primary | ICD-10-CM

## 2019-05-14 DIAGNOSIS — C91.01 ACUTE LYMPHOBLASTIC LEUKEMIA (ALL) IN REMISSION (H): Primary | ICD-10-CM

## 2019-05-14 PROCEDURE — G0463 HOSPITAL OUTPT CLINIC VISIT: HCPCS | Mod: ZF

## 2019-05-14 RX ORDER — TACROLIMUS 1 MG/G
OINTMENT TOPICAL 2 TIMES DAILY
Qty: 60 G | Refills: 1 | Status: SHIPPED | OUTPATIENT
Start: 2019-05-14 | End: 2020-07-16

## 2019-05-14 RX ORDER — IVERMECTIN 10 MG/G
CREAM TOPICAL
Qty: 60 G | Refills: 3 | Status: SHIPPED | OUTPATIENT
Start: 2019-05-14 | End: 2019-05-24

## 2019-05-14 ASSESSMENT — PAIN SCALES - GENERAL: PAINLEVEL: NO PAIN (0)

## 2019-05-14 ASSESSMENT — MIFFLIN-ST. JEOR: SCORE: 1634.87

## 2019-05-14 NOTE — OR NURSING
Spoke with Patient's stepmother Camryn.  In addition to the Bone Marrow Biopsy scheduled for Thursday she thought the patient was also getting his port removed.  I do not have that on the schedule.  I did call Mona the  to see if she had heard anything about that and she said no. I also left a voicemail with Daria Jara RN in regards to this.

## 2019-05-14 NOTE — PROGRESS NOTES
Pediatric Dermatology Return Patient Visit     Dermatology problem list  1. 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  2. 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 Patient was seen 1 month ago and the demodex mite was identified. Patient completed 2 courses of ivermectin (4 doses). Last dose was last Wednesday. Has been using the protopic and covering this with aquaphor - does this everyday at bedtime. He does think that it has improved somewhat but it continues to fluctuate. Not sure if he noticed any improvement in association with his doses of medication, it seems to be independent of this. The rash has classically been on his face, but for the first time it spread to involve his neck about 1 week ago. Mildly itchy from time to time but mostly does not bother him.    Past Medical/Surgical History: reviewed, unchanged.  Family History: Reviewed, unchanged.  Social History: Reviewed, unchanged.  Medications: Reviewed in epic.  Allergies: Reviewed in epic.    ROS: a 10 point review of systems including constitutional, HEENT, CV, GI, musculoskeletal, Neurologic, Endocrine, Respiratory, Hematologic and Allergic/Immunologic was performed and was negative.    Physical examination:   General: Well-developed, well-nourished in no apparent distress  Eyes: lids, conjunctivae normal  Neck: supple  Respiratory: breathing comfortably  Cardiovascular: Well-perfused without edema or varicosities  Psychiatric: normal mood and affect  Extremities: No clubbing or cyanosis, nails normal  Skin: Skin examination of the scalp, face, neck, chest, abdomen, back, right and left arms, hands, fingers, ears, eyelids, and  lips. This was notable for:  - Scattered flesh colored somewhat acneiform papules on the forehead and cheeks  - Near complete resolution of the in pink scaly plaques    Assessment:  1. Demodex dermatitis: Reviewed this is a mite found on everyone's skin but due to the current immunosuppression, he has a hypersensitivity to this. We do expect that the rash will continue to fluctuate as the demodex will always been present on the skin. For this reason we would like to help get it under control with topical soolantra (instead of continually redosing the ivermectin) and topical protopic to reduce inflammation.  - Start soolantra at bedtime  - Continue protopic in the morning  - Continue with gentle cleanser of the head and neck daily      Follow up in 6 months, sooner prn.     Patient staffed with Dr. Nubia Alcaraz MD  Dermatology Resident, PGY3      I have personally examined this patient and agree with the resident's documentation and plan of care.  I have reviewed and amended the resident's note above.  The documentation accurately reflects my clinical observations, diagnoses, treatment and follow-up plans.     Abdirizak Delacruz MD  , Pediatric Dermatology

## 2019-05-14 NOTE — NURSING NOTE
"[facility] [[id number]]  Chief Complaint   Patient presents with     RECHECK     Rash worsening     Initial Ht 5' 9.09\" (175.5 cm)   Wt 139 lb 8.8 oz (63.3 kg)   BMI 20.55 kg/m   Estimated body mass index is 20.55 kg/m  as calculated from the following:    Height as of this encounter: 5' 9.09\" (175.5 cm).    Weight as of this encounter: 139 lb 8.8 oz (63.3 kg).  Medication Reconciliation: complete  "

## 2019-05-14 NOTE — LETTER
5/14/2019    RE: Artemio Nino  [address]  [place] [address]     Pediatric Dermatology Return Patient Visit     Dermatology problem list  1. 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  2. 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 Patient was seen 1 month ago and the demodex mite was identified. Patient completed 2 courses of ivermectin (4 doses). Last dose was last Wednesday. Has been using the protopic and covering this with aquaphor - does this everyday at bedtime. He does think that it has improved somewhat but it continues to fluctuate. Not sure if he noticed any improvement in association with his doses of medication, it seems to be independent of this. The rash has classically been on his face, but for the first time it spread to involve his neck about 1 week ago. Mildly itchy from time to time but mostly does not bother him.    Past Medical/Surgical History:  reviewed, unchanged.  Family History:  Reviewed, unchanged.  Social History:  Reviewed, unchanged.  Medications:  Reviewed in epic.  Allergies:  Reviewed in epic.    ROS: a 10 point review of systems including constitutional, HEENT, CV, GI, musculoskeletal, Neurologic, Endocrine, Respiratory, Hematologic and Allergic/Immunologic was performed and was negative.    Physical examination:   General: Well-developed, well-nourished in no apparent distress  Eyes: lids, conjunctivae normal  Neck: supple  Respiratory: breathing comfortably  Cardiovascular: Well-perfused without edema or varicosities  Psychiatric: normal mood and affect  Extremities: No clubbing or cyanosis, nails normal  Skin: Skin examination of the  scalp, face, neck, chest, abdomen, back, right and left arms, hands, fingers, ears, eyelids, and lips. This was notable for:  - Scattered flesh colored somewhat acneiform papules on the forehead and cheeks  - Near complete resolution of the in pink scaly plaques    Assessment:  1. Demodex  dermatitis: Reviewed this is a mite found on everyone's skin but due to the current immunosuppression, he has a hypersensitivity to this. We do expect that the rash will continue to fluctuate as the demodex will always been present on the skin. For this reason we would like to help get it under control with topical soolantra (instead of continually redosing the ivermectin) and topical protopic to reduce inflammation.  - Start soolantra at bedtime  - Continue protopic in the morning  - Continue with gentle cleanser of the head and neck daily      Follow up in 6 months, sooner prn.     Patient staffed with Dr. Nubia Alcaraz MD  Dermatology Resident, PGY3      I have personally examined this patient and agree with the resident's documentation and plan of care.  I have reviewed and amended the resident's note above.  The documentation accurately reflects my clinical observations, diagnoses, treatment and follow-up plans.     Abdirizak Delacruz MD  , Pediatric Dermatology

## 2019-05-14 NOTE — PATIENT INSTRUCTIONS
Start the soolantra at night and the protopic in the morning.  We hope to keep it under control with this regimen.  As you know this demodex is found on everyone's skin and yours is reacting due to your altered immune system, our goal is to try and keep it under control. We think the topical cream will be more helpful for this rather than having to continually re-dose the pills.        Pediatric Dermatology  [address] [address]  [phone]    iSUN PROTECTION: SPECIAL RECOMMENDATIONS FOR IMMUNOSUPPRESSED CHILDREN & TEENS    Why protect my skin from the sun?  People with impaired immune systems are at an increased risk of developing skin cancer because it is more difficult for the body to repair sun damage.      What Causes Immune Suppression?    There are many causes. Some of the most common that we see in our clinics are:    Solid organ transplantation    Bone marrow transplantation    Cancer and cancer treatments    Some genetic syndromes     Medications to treat inflammatory conditions      A pediatric dermatologist will work with your medical team to monitor your skin health.  Usually, a yearly visit to the dermatologist is recommended.    Good sun protection is the most important step to protect against skin cancer and sun damage.       How can I protect my skin from the sun?    Avoidance: Staying out of the sun during the peak hours of 10am - 2pm will greatly reduce total UV exposure. Seeking out playgrounds with shade structures, and playing in shady areas of the park or yard are all good first steps to protect yourself.     Sun Protective Clothing:  A variety of options are available for hats, lightweight clothing, and swimwear that block up to 98% of UV rays.  The products are washable and safe for people with sensitive skin.  Also, choose sunglasses with 100% UVA and B absorption to protect your eyes.     Sunscreen Information:  Use a broad spectrum  sunscreen with an SPF of at least 30 on all exposed skin.  Sunscreen should be labeled to protect against both UVA and UVB rays.  UVA rays cause skin cancer and skin aging, while UVB rays cause sunburns.      What sunscreen should I use?  There are two main types of sunscreens- physical blockers that reflect the sun's rays, and chemical blockers that absorb the rays before they damage the skin.  The physical blockers are effective as soon as they are applied.  The chemical blockers take 20 minutes to activate on the skin.     Labels will list these active ingredients:  Physical blockers:  titanium dioxide and zinc oxide  Chemical blockers:  avobenzone, oxybenzone, octylcrylene, mexoryl, and many others     What SPF do I need?  Sunscreen with a sun protective factor (SPF) 30 will block 95-97% of the sun's rays.  Increased SPF above this point adds only minimal increased sun protection.  Choose a sunscreen with at least an SPF of 30.     How often do I need to reapply?  Sunscreen should be reapplied every two hours and after swimming or sweating.      When do I need to wear sunscreen?  Whenever you are outside or riding in a car.  Most people get a large amount of sun exposure when they are in the car.  The front window protects against the sun's rays, but the side windows are far less protective.  The sun can damage the skin even in cold winter climates.  Skin does not need to tan or burn to be damaged by the sun.      How much should I apply?  For a normal-sized adult, one ounce (a shot glass full) of sunscreen should cover the whole body.  There are no general guidelines for infants/children, but a rough estimate is a fingertip unit per body part (e.g. 1 fingertip unit each for face, R arm, L arm, chest, stomach, etc.)         What sunscreens are safe for children?  Pediatric dermatologists generally recommend physical sunscreens that contain minerals that reflect the sun, as opposed to chemicals. Even people with  very sensitive skin or skin allergies can usually tolerate this type of sunscreen.  In general, spray-on sunscreens are less effective because it is difficult to apply a thick enough coating.  Also, it may be harmful to inhale these products.     Children under six months of age should not have prolonged sun exposure.  Sunscreen may be used on areas of the skin that may be exposed to the sun. For infants younger than 6 months, shade and protective clothing are the best lines of defense.  What about vitamin D?  Some people worry that they need sunlight to get enough vitamin D.  Oral vitamin D, found in foods and supplements, is very effective, and safer than exposing your skin to UV rays.     When should I worry?  It is normal to develop new moles throughout the childhood and teen years. Warning signs for abnormal moles include:    A: Asymmetry, meaning that the mole s shape is not equal on both sides  B: Irregular or indistinct borders  C: Color- multiple colors in a mole   D: Diameter bigger than the eraser on a pencil (6 mm)  E: Evolving or growing rapidly. This is the most concerning change    Other concerning skin changes to talk to your dermatologist about include:    Growing pink bumps    Scaly patches that do not go away    Skin growths that are bleeding or painful    If in doubt, please talk to your physician promptly.     Resources and References:    Al PLAZAD, Dick RE, Carole G, et al. Solid cancers after allogeneic hematopoietic cell transplantation. Blood 2009;113(5): 6722-8043.    Arthur DELATORRE. Cancer in Fanconi anemia, 3042-4369. Cancer 2003; 97: 425-440.    American Academy of Dermatology. Sunscreen FAQs. 2014.  www.aad.org/media-resources/stats-and-facts/prevention-and-care/sunscreens    Skin Cancer Foundation. Sun Protection. 2014. http://www.skincancer.org/prevention/sun-protection    BELIA Watters, TEJAS Santoro, GORDO Eastman.  Application patterns among participants randomized to daily sunscreen use in a skin  cancer prevention trial. Arch Dermatol. 2002; 138, 6005-5397.    http://www.healthychildren.org/english/safety-prevention/at-play/pages/sun-safety.aspx    Skin Cancer Foundation. Recognizing Skin Cancer. 2014. http://www.skincancer.org/skin-cancer-information    [facility]- Pediatric Dermatology  Dr. Laura Schaffer, Dr. Abdirizak Delacruz, Dr. Jazmín Gill, Dr. Wanda Bowden & Dr. Alvaro Leon       Non Urgent  Nurse Triage Line; [phone]- Nica and Harriet RN Care Coordinators        If you need a prescription refill, please contact your pharmacy. Refills are approved or denied by our Physicians during normal business hours, Monday through Fridays    Per office policy, refills will not be granted if you have not been seen within the past year (or sooner depending on your child's condition)      Scheduling Information:     Pediatric Appointment Scheduling and Call Center [phone]   Radiology Scheduling- [phone]     Sedation Unit Scheduling- [phone]    [place] Scheduling- General [phone]; Pediatric Dermatology [phone]    Main  Services: [phone]   Divehi: [phone]   Lao: [phone]   Hmong/Sahil/Jm: [phone]      Preadmission Nursing Department Fax Number: [phone] (Fax all pre-operative paperwork to this number)      For urgent matters arising during evenings, weekends, or holidays that cannot wait for normal business hours please call [phone] and ask for the Dermatology Resident On-Call to be paged.

## 2019-05-15 RX ORDER — CEFAZOLIN SODIUM 2 G/100ML
2 INJECTION, SOLUTION INTRAVENOUS
Status: CANCELLED | OUTPATIENT
Start: 2019-05-16

## 2019-05-16 ENCOUNTER — HOSPITAL ENCOUNTER (OUTPATIENT)
Dept: INTERVENTIONAL RADIOLOGY/VASCULAR | Facility: CLINIC | Age: 19
End: 2019-05-16
Attending: PEDIATRICS
Payer: COMMERCIAL

## 2019-05-16 ENCOUNTER — ANESTHESIA EVENT (OUTPATIENT)
Dept: SURGERY | Facility: CLINIC | Age: 19
End: 2019-05-16
Payer: COMMERCIAL

## 2019-05-16 ENCOUNTER — ONCOLOGY VISIT (OUTPATIENT)
Dept: TRANSPLANT | Facility: CLINIC | Age: 19
End: 2019-05-16
Attending: NURSE PRACTITIONER
Payer: COMMERCIAL

## 2019-05-16 ENCOUNTER — APPOINTMENT (OUTPATIENT)
Dept: LAB | Facility: CLINIC | Age: 19
End: 2019-05-16
Attending: PEDIATRICS
Payer: COMMERCIAL

## 2019-05-16 ENCOUNTER — ANESTHESIA (OUTPATIENT)
Dept: SURGERY | Facility: CLINIC | Age: 19
End: 2019-05-16
Payer: COMMERCIAL

## 2019-05-16 ENCOUNTER — HOSPITAL ENCOUNTER (OUTPATIENT)
Facility: CLINIC | Age: 19
Discharge: HOME | End: 2019-05-16
Attending: PEDIATRICS | Admitting: PEDIATRICS
Payer: COMMERCIAL

## 2019-05-16 VITALS
OXYGEN SATURATION: 100 % | TEMPERATURE: 97.3 F | SYSTOLIC BLOOD PRESSURE: 95 MMHG | WEIGHT: 139.55 LBS | BODY MASS INDEX: 20.67 KG/M2 | HEIGHT: 69 IN | RESPIRATION RATE: 22 BRPM | HEART RATE: 45 BPM | DIASTOLIC BLOOD PRESSURE: 65 MMHG

## 2019-05-16 DIAGNOSIS — C91.00 ALL (ACUTE LYMPHOBLASTIC LEUKEMIA OF INFANT) (H): Primary | ICD-10-CM

## 2019-05-16 DIAGNOSIS — C91.01 ACUTE LYMPHOBLASTIC LEUKEMIA (ALL) IN REMISSION (H): ICD-10-CM

## 2019-05-16 LAB
ERYTHROCYTE [DISTWIDTH] IN BLOOD BY AUTOMATED COUNT: 12.5 % (ref 10–15)
HCT VFR BLD AUTO: 33.1 % (ref 40–53)
HGB BLD-MCNC: 11.2 G/DL (ref 13.3–17.7)
INR PPP: 1.12 (ref 0.86–1.14)
MCH RBC QN AUTO: 31.5 PG (ref 26.5–33)
MCHC RBC AUTO-ENTMCNC: 33.8 G/DL (ref 31.5–36.5)
MCV RBC AUTO: 93 FL (ref 78–100)
PLATELET # BLD AUTO: 134 10E9/L (ref 150–450)
RBC # BLD AUTO: 3.55 10E12/L (ref 4.4–5.9)
WBC # BLD AUTO: 3 10E9/L (ref 4–11)

## 2019-05-16 PROCEDURE — 81267 CHIMERISM ANAL NO CELL SELEC: CPT | Performed by: NURSE PRACTITIONER

## 2019-05-16 PROCEDURE — 71000014 ZZH RECOVERY PHASE 1 LEVEL 2 FIRST HR: Performed by: NURSE PRACTITIONER

## 2019-05-16 PROCEDURE — 25000125 ZZHC RX 250: Performed by: NURSE PRACTITIONER

## 2019-05-16 PROCEDURE — 40000564 ZZHCL STATISTIC BONE MARROW CORE PERF TC ACL/CSC 38221: Performed by: NURSE PRACTITIONER

## 2019-05-16 PROCEDURE — 81268 CHIMERISM ANAL W/CELL SELECT: CPT | Performed by: NURSE PRACTITIONER

## 2019-05-16 PROCEDURE — 25800030 ZZH RX IP 258 OP 636: Performed by: REGISTERED NURSE

## 2019-05-16 PROCEDURE — 88305 TISSUE EXAM BY PATHOLOGIST: CPT | Performed by: NURSE PRACTITIONER

## 2019-05-16 PROCEDURE — 85610 PROTHROMBIN TIME: CPT | Performed by: PEDIATRICS

## 2019-05-16 PROCEDURE — 25000128 H RX IP 250 OP 636: Performed by: NURSE ANESTHETIST, CERTIFIED REGISTERED

## 2019-05-16 PROCEDURE — 27211024 ZZHC OR SUPPLY OTHER OPNP: Performed by: NURSE PRACTITIONER

## 2019-05-16 PROCEDURE — 25000125 ZZHC RX 250: Performed by: REGISTERED NURSE

## 2019-05-16 PROCEDURE — 25000128 H RX IP 250 OP 636: Performed by: REGISTERED NURSE

## 2019-05-16 PROCEDURE — 71000027 ZZH RECOVERY PHASE 2 EACH 15 MINS: Performed by: NURSE PRACTITIONER

## 2019-05-16 PROCEDURE — 40000951 ZZHCL STATISTIC BONE MARROW INTERP TC 85097: Performed by: NURSE PRACTITIONER

## 2019-05-16 PROCEDURE — 37000008 ZZH ANESTHESIA TECHNICAL FEE, 1ST 30 MIN: Performed by: NURSE PRACTITIONER

## 2019-05-16 PROCEDURE — 36000051 ZZH SURGERY LEVEL 2 1ST 30 MIN - UMMC: Performed by: NURSE PRACTITIONER

## 2019-05-16 PROCEDURE — 40000003 IR PORT REMOVAL LEFT

## 2019-05-16 PROCEDURE — 27210794 ZZH OR GENERAL SUPPLY STERILE: Performed by: NURSE PRACTITIONER

## 2019-05-16 PROCEDURE — 88275 CYTOGENETICS 100-300: CPT | Performed by: NURSE PRACTITIONER

## 2019-05-16 PROCEDURE — 40001004 ZZHCL STATISTIC FLOW INT 9-15 ABY TC 88188: Performed by: NURSE PRACTITIONER

## 2019-05-16 PROCEDURE — 88264 CHROMOSOME ANALYSIS 20-25: CPT | Performed by: NURSE PRACTITIONER

## 2019-05-16 PROCEDURE — 40000170 ZZH STATISTIC PRE-PROCEDURE ASSESSMENT II: Performed by: NURSE PRACTITIONER

## 2019-05-16 PROCEDURE — 40000611 ZZHCL STATISTIC MORPHOLOGY W/INTERP HEMEPATH TC 85060: Performed by: NURSE PRACTITIONER

## 2019-05-16 PROCEDURE — 88311 DECALCIFY TISSUE: CPT | Performed by: NURSE PRACTITIONER

## 2019-05-16 PROCEDURE — 88271 CYTOGENETICS DNA PROBE: CPT | Performed by: NURSE PRACTITIONER

## 2019-05-16 PROCEDURE — 81268 CHIMERISM ANAL W/CELL SELECT: CPT | Mod: 91 | Performed by: PEDIATRICS

## 2019-05-16 PROCEDURE — 85027 COMPLETE CBC AUTOMATED: CPT | Performed by: PEDIATRICS

## 2019-05-16 PROCEDURE — 88161 CYTOPATH SMEAR OTHER SOURCE: CPT | Performed by: NURSE PRACTITIONER

## 2019-05-16 PROCEDURE — 88185 FLOWCYTOMETRY/TC ADD-ON: CPT | Performed by: NURSE PRACTITIONER

## 2019-05-16 PROCEDURE — 88237 TISSUE CULTURE BONE MARROW: CPT | Performed by: NURSE PRACTITIONER

## 2019-05-16 PROCEDURE — 36000053 ZZH SURGERY LEVEL 2 EA 15 ADDTL MIN - UMMC: Performed by: NURSE PRACTITIONER

## 2019-05-16 PROCEDURE — 40000567 ZZHCL STATISTIC BONE MARROW ASP PERF TC ACL/CSC 38220: Performed by: NURSE PRACTITIONER

## 2019-05-16 PROCEDURE — 37000009 ZZH ANESTHESIA TECHNICAL FEE, EACH ADDTL 15 MIN: Performed by: NURSE PRACTITIONER

## 2019-05-16 PROCEDURE — 88184 FLOWCYTOMETRY/ TC 1 MARKER: CPT | Performed by: NURSE PRACTITIONER

## 2019-05-16 PROCEDURE — 36416 COLLJ CAPILLARY BLOOD SPEC: CPT | Performed by: NURSE PRACTITIONER

## 2019-05-16 PROCEDURE — 00000161 ZZHCL STATISTIC H-SPHEME PROCESS B/S: Performed by: NURSE PRACTITIONER

## 2019-05-16 PROCEDURE — 88280 CHROMOSOME KARYOTYPE STUDY: CPT | Performed by: NURSE PRACTITIONER

## 2019-05-16 RX ORDER — NICOTINE POLACRILEX 4 MG
15-30 LOZENGE BUCCAL
Status: CANCELLED | OUTPATIENT
Start: 2019-05-16

## 2019-05-16 RX ORDER — EPHEDRINE SULFATE 50 MG/ML
INJECTION, SOLUTION INTRAMUSCULAR; INTRAVENOUS; SUBCUTANEOUS PRN
Status: DISCONTINUED | OUTPATIENT
Start: 2019-05-16 | End: 2019-05-16

## 2019-05-16 RX ORDER — PROPOFOL 10 MG/ML
INJECTION, EMULSION INTRAVENOUS CONTINUOUS PRN
Status: DISCONTINUED | OUTPATIENT
Start: 2019-05-16 | End: 2019-05-16

## 2019-05-16 RX ORDER — ONDANSETRON 4 MG/1
4 TABLET, ORALLY DISINTEGRATING ORAL EVERY 30 MIN PRN
Status: DISCONTINUED | OUTPATIENT
Start: 2019-05-16 | End: 2019-05-16 | Stop reason: HOSPADM

## 2019-05-16 RX ORDER — GLYCOPYRROLATE 0.2 MG/ML
INJECTION, SOLUTION INTRAMUSCULAR; INTRAVENOUS PRN
Status: DISCONTINUED | OUTPATIENT
Start: 2019-05-16 | End: 2019-05-16

## 2019-05-16 RX ORDER — LIDOCAINE HYDROCHLORIDE 20 MG/ML
INJECTION, SOLUTION INFILTRATION; PERINEURAL PRN
Status: DISCONTINUED | OUTPATIENT
Start: 2019-05-16 | End: 2019-05-16

## 2019-05-16 RX ORDER — MEPERIDINE HYDROCHLORIDE 25 MG/ML
12.5 INJECTION INTRAMUSCULAR; INTRAVENOUS; SUBCUTANEOUS
Status: DISCONTINUED | OUTPATIENT
Start: 2019-05-16 | End: 2019-05-16 | Stop reason: HOSPADM

## 2019-05-16 RX ORDER — SODIUM CHLORIDE, SODIUM LACTATE, POTASSIUM CHLORIDE, CALCIUM CHLORIDE 600; 310; 30; 20 MG/100ML; MG/100ML; MG/100ML; MG/100ML
INJECTION, SOLUTION INTRAVENOUS CONTINUOUS
Status: DISCONTINUED | OUTPATIENT
Start: 2019-05-16 | End: 2019-05-16 | Stop reason: HOSPADM

## 2019-05-16 RX ORDER — ACETAMINOPHEN 325 MG/1
650 TABLET ORAL
Status: DISCONTINUED | OUTPATIENT
Start: 2019-05-16 | End: 2019-05-16 | Stop reason: HOSPADM

## 2019-05-16 RX ORDER — PROPOFOL 10 MG/ML
INJECTION, EMULSION INTRAVENOUS PRN
Status: DISCONTINUED | OUTPATIENT
Start: 2019-05-16 | End: 2019-05-16

## 2019-05-16 RX ORDER — NICOTINE POLACRILEX 4 MG
15-30 LOZENGE BUCCAL
Status: DISCONTINUED | OUTPATIENT
Start: 2019-05-16 | End: 2019-05-16 | Stop reason: HOSPADM

## 2019-05-16 RX ORDER — MAGNESIUM HYDROXIDE 1200 MG/15ML
LIQUID ORAL PRN
Status: DISCONTINUED | OUTPATIENT
Start: 2019-05-16 | End: 2019-05-16 | Stop reason: HOSPADM

## 2019-05-16 RX ORDER — DEXTROSE MONOHYDRATE 25 G/50ML
25-50 INJECTION, SOLUTION INTRAVENOUS
Status: CANCELLED | OUTPATIENT
Start: 2019-05-16

## 2019-05-16 RX ORDER — ONDANSETRON 2 MG/ML
4 INJECTION INTRAMUSCULAR; INTRAVENOUS EVERY 30 MIN PRN
Status: DISCONTINUED | OUTPATIENT
Start: 2019-05-16 | End: 2019-05-16 | Stop reason: HOSPADM

## 2019-05-16 RX ORDER — NALOXONE HYDROCHLORIDE 0.4 MG/ML
.1-.4 INJECTION, SOLUTION INTRAMUSCULAR; INTRAVENOUS; SUBCUTANEOUS
Status: DISCONTINUED | OUTPATIENT
Start: 2019-05-16 | End: 2019-05-16 | Stop reason: HOSPADM

## 2019-05-16 RX ORDER — FENTANYL CITRATE 50 UG/ML
25-50 INJECTION, SOLUTION INTRAMUSCULAR; INTRAVENOUS
Status: DISCONTINUED | OUTPATIENT
Start: 2019-05-16 | End: 2019-05-16 | Stop reason: HOSPADM

## 2019-05-16 RX ORDER — DEXTROSE MONOHYDRATE 25 G/50ML
25-50 INJECTION, SOLUTION INTRAVENOUS
Status: DISCONTINUED | OUTPATIENT
Start: 2019-05-16 | End: 2019-05-16 | Stop reason: HOSPADM

## 2019-05-16 RX ORDER — SODIUM CHLORIDE, SODIUM LACTATE, POTASSIUM CHLORIDE, CALCIUM CHLORIDE 600; 310; 30; 20 MG/100ML; MG/100ML; MG/100ML; MG/100ML
INJECTION, SOLUTION INTRAVENOUS CONTINUOUS PRN
Status: DISCONTINUED | OUTPATIENT
Start: 2019-05-16 | End: 2019-05-16

## 2019-05-16 RX ORDER — FENTANYL CITRATE 50 UG/ML
INJECTION, SOLUTION INTRAMUSCULAR; INTRAVENOUS PRN
Status: DISCONTINUED | OUTPATIENT
Start: 2019-05-16 | End: 2019-05-16

## 2019-05-16 RX ORDER — LIDOCAINE HYDROCHLORIDE 10 MG/ML
INJECTION, SOLUTION INFILTRATION; PERINEURAL PRN
Status: DISCONTINUED | OUTPATIENT
Start: 2019-05-16 | End: 2019-05-16 | Stop reason: HOSPADM

## 2019-05-16 RX ORDER — ONDANSETRON 2 MG/ML
INJECTION INTRAMUSCULAR; INTRAVENOUS PRN
Status: DISCONTINUED | OUTPATIENT
Start: 2019-05-16 | End: 2019-05-16

## 2019-05-16 RX ADMIN — PHENYLEPHRINE HYDROCHLORIDE 50 MCG: 10 INJECTION, SOLUTION INTRAMUSCULAR; INTRAVENOUS; SUBCUTANEOUS at 13:13

## 2019-05-16 RX ADMIN — PROPOFOL 20 MG: 10 INJECTION, EMULSION INTRAVENOUS at 12:50

## 2019-05-16 RX ADMIN — ONDANSETRON 4 MG: 2 INJECTION INTRAMUSCULAR; INTRAVENOUS at 14:30

## 2019-05-16 RX ADMIN — FENTANYL CITRATE 50 MCG: 50 INJECTION, SOLUTION INTRAMUSCULAR; INTRAVENOUS at 13:03

## 2019-05-16 RX ADMIN — LIDOCAINE HYDROCHLORIDE 40 MG: 20 INJECTION, SOLUTION INFILTRATION; PERINEURAL at 12:40

## 2019-05-16 RX ADMIN — PROPOFOL 20 MG: 10 INJECTION, EMULSION INTRAVENOUS at 12:52

## 2019-05-16 RX ADMIN — PHENYLEPHRINE HYDROCHLORIDE 100 MCG: 10 INJECTION, SOLUTION INTRAMUSCULAR; INTRAVENOUS; SUBCUTANEOUS at 13:59

## 2019-05-16 RX ADMIN — PROPOFOL 20 MG: 10 INJECTION, EMULSION INTRAVENOUS at 12:54

## 2019-05-16 RX ADMIN — MIDAZOLAM 2 MG: 1 INJECTION INTRAMUSCULAR; INTRAVENOUS at 12:31

## 2019-05-16 RX ADMIN — PROPOFOL 30 MG: 10 INJECTION, EMULSION INTRAVENOUS at 12:41

## 2019-05-16 RX ADMIN — Medication 5 MG: at 13:00

## 2019-05-16 RX ADMIN — SODIUM CHLORIDE, SODIUM LACTATE, POTASSIUM CHLORIDE, CALCIUM CHLORIDE: 600; 310; 30; 20 INJECTION, SOLUTION INTRAVENOUS at 12:33

## 2019-05-16 RX ADMIN — SODIUM CHLORIDE, SODIUM LACTATE, POTASSIUM CHLORIDE, CALCIUM CHLORIDE: 600; 310; 30; 20 INJECTION, SOLUTION INTRAVENOUS at 14:05

## 2019-05-16 RX ADMIN — PROPOFOL 100 MCG/KG/MIN: 10 INJECTION, EMULSION INTRAVENOUS at 12:41

## 2019-05-16 RX ADMIN — PROPOFOL 20 MG: 10 INJECTION, EMULSION INTRAVENOUS at 14:53

## 2019-05-16 RX ADMIN — PROPOFOL 30 MG: 10 INJECTION, EMULSION INTRAVENOUS at 14:13

## 2019-05-16 RX ADMIN — Medication 5 MG: at 13:54

## 2019-05-16 RX ADMIN — PHENYLEPHRINE HYDROCHLORIDE 100 MCG: 10 INJECTION, SOLUTION INTRAMUSCULAR; INTRAVENOUS; SUBCUTANEOUS at 13:23

## 2019-05-16 RX ADMIN — GLYCOPYRROLATE 0.2 MG: 0.2 INJECTION, SOLUTION INTRAMUSCULAR; INTRAVENOUS at 14:02

## 2019-05-16 ASSESSMENT — MIFFLIN-ST. JEOR: SCORE: 1633.38

## 2019-05-16 NOTE — PROCEDURES
BMT Bone Marrow Biopsy Procedure Note  May 16, 2019 2:13 PM    DIAGNOSIS: Status post bone marrow transplant for ALL    PROCEDURE: Unilateral Bone Marrow Biopsy and Unilateral Aspirate    SITE: Pediatric Sedation Suite    Patient s identification was positively verified by verbal identification and invasive procedure safety checklist was completed.  Informed consent was obtained. Following the administration of propofol as sedation, patient was placed in the  left lateral decubitus position and prepped and draped in a sterile manner.  Approximately 2 cc of 1% Lidocaine was used over the right posterior iliac spine.  Following this a 3 mm incision was made. Trephine bone marrow core was obtained from the [facility]. Bone marrow aspirates were obtained from the [facility]. Aspirates were sent for morphology, immunophenotyping, cytogenetics, molecular diagnostics and process and hold DNA.  A total of approximately 20 ml of marrow was aspirated.  Following this procedure a sterile dressing was applied to the bone marrow biopsy site. The patient was placed in the supine position to maintain pressure on the biopsy site. Post-procedure wound care instructions were given. The patient tolerated the procedure well with no discomfort.  Complications: None    Procedure performed by:   Lilia Prabhakar MSN, CPNP-AC  Pediatric Blood and Marrow Transplant Program  [facility] [phone]  Pager [phone]

## 2019-05-16 NOTE — ANESTHESIA POSTPROCEDURE EVALUATION
Anesthesia POST Procedure Evaluation    Patient: Artemio Nino   MRN:     [id number] Gender:   male   Age:    18 year old :      2000        Preoperative Diagnosis: Acute Lymphoblastic Leukemia   Procedure(s):  BIOPSY, BONE MARROW  REMOVAL, VASCULAR ACCESS PORT   Postop Comments: No value filed.       Anesthesia Type:  General  No value filed.    Reportable Event: NO     PAIN: Uncomplicated   Sign Out status: Comfortable, Well controlled pain     PONV: No PONV   Sign Out status:  No Nausea or Vomiting     Neuro/Psych: Uneventful perioperative course   Sign Out Status: Preoperative baseline; Age appropriate mentation     Airway/Resp.: Uneventful perioperative course   Sign Out Status: Airway Device present     CV: Uneventful perioperative course   Sign Out status: Appropriate BP and perfusion indices; Appropriate HR/Rhythm     Disposition:   Sign Out in:  PACU  Disposition:  Phase II; Home  Recovery Course: Uneventful  Follow-Up: Not required           Last Anesthesia Record Vitals:  CRNA VITALS  2019 1428 - 2019 1519      2019             Pulse:  60    SpO2:  100 %          Last PACU Vitals:  Vitals Value Taken Time   BP 96/54 2019  3:15 PM   Temp     Pulse 50 2019  3:15 PM   Resp 17 2019  3:18 PM   SpO2 100 % 2019  3:18 PM   Temp src     NIBP     Pulse     SpO2     Resp     Temp     Ht Rate     Temp 2     Vitals shown include unvalidated device data.      Electronically Signed By: Daria Botello MD, May 16, 2019, 3:19 PM

## 2019-05-16 NOTE — DISCHARGE INSTRUCTIONS
[facility]  [phone]    Care for Bone Marrow Biopsy    Do not remove bandage/dressing for 24 hours -- after this time they can be removed. If Steri-strips are presents they can stay on until they fall off    No bath, shower or soaking of the dressing for 24 hours    Activity as tolerated by the patient    Diet as able to tolerate    May use Tylenol as needed for pain control    Can apply icepack to the site for discomfort -- no more than 10 minutes at a time    If bleeding presents, apply pressure for 5 minutes    Call [phone] ask for Peds BMT/Hem/Onc fellow on call if complications arise including:     persistent bleeding    fever greater than 100.5    pain   [facility]  Pediatric Interventional Radiology  Discharge Instructions for Tunneled Central Venous Catheter Removal    Date of Procedure: 5/16/2019      Today you had a Tunneled Central Venous Catheter Removed by Lilia López APRN CNP      Activity    No strenuous activity for 1 week    No heavy lifting (greater than 10 pounds) for 3 days     No tub bath, hot tub, or swimming until Steri-strips are no longer there (about 7 days)    It is OK to shower.  Cover the dressing with plastic wrap before taking your shower.     Diet    Resume your regular diet    Discomfort     Pain medications as directed    Site Care    Keep the dressing dry and intact.  Keep the wound clean and dry for 3 days.  After 3 days you may remove the dressing and use Band-Aids until the wound is healed.  Do not remove the Steri-strips for at least 7 days.      If there is any oozing or bleeding from the site, apply direct pressure for 5-10 minutes with a gauze pad.  If bleeding continues after 10 minutes, call Pediatric Interventional Radiology.  If bleeding cannot be controlled with direct pressure, call 911.      ***    If sedation was given:    DO NOT drive or operate heavy machinery for 24 hours    DO NOT drink alcoholic  beverages for 24 hours    DO NOT make important legal decisions for 24 hours    You must have a responsible adult to drive you home and stay with you for 24 hours    Call your Doctor if:    Bleeding    Swelling in your neck or arm    Fever greater than 100.5 degrees F (oral)    Other signs of infection such as redness, tenderness, or drainage from the wound    If you have questions or concerns about this procedure:  Pediatric Interventional Radiology [phone] Mon-Fri, 7am to 5pm    [phone] After-hours, weekends, holidays  Ask for the Pediatric Interventional Radiologist on-call   Same-Day Surgery   Adult Discharge Orders & Instructions     For 24 hours after surgery:  1. Get plenty of rest.  A responsible adult must stay with you for at least 24 hours after you leave the hospital.   2. Pain medication can slow your reflexes. Do not drive or use heavy equipment.  If you have weakness or tingling, don't drive or use heavy equipment until this feeling goes away.  3. Mixing alcohol and pain medication can cause dizziness and slow your breathing. It can even be fatal. Do not drink alcohol while taking pain medication.  4. Avoid strenuous or risky activities.  Ask for help when climbing stairs.   5. You may feel lightheaded.  If so, sit for a few minutes before standing.  Have someone help you get up.   6. If you have nausea (feel sick to your stomach), drink only clear liquids such as apple juice, ginger ale, broth or 7-Up.  Rest may also help.  Be sure to drink enough fluids.  Move to a regular diet as you feel able. Take pain medications with a small amount of solid food, such as toast or crackers, to avoid nausea.   7. A slight fever is normal. Call the doctor if your fever is over 100 F (37.7 C) (taken under the tongue) or lasts longer than 24 hours.  8. You may have a dry mouth, muscle aches, trouble sleeping or a sore throat.  These symptoms should go away after 24 hours.  9. Do not make important or  legal decisions.   Pain Management:      1. Take pain medication (if prescribed) for pain as directed by your physician.        2. WARNING: If the pain medication you have been prescribed contains Tylenol  (acetaminophen), DO NOT take additional doses of Tylenol (acetaminophen).     Call your doctor for any of the followin.  Signs of infection (fever, growing tenderness at the surgery site, severe pain, a large amount of drainage or bleeding, foul-smelling drainage, redness, swelling).    2.  It has been over 8 to 10 hours since surgery and you are still not able to urinate (pee).    3.  Headache for over 24 hours.    4.  Numbness, tingling or weakness the day after surgery (if you had spinal anesthesia).  To contact a doctor, call [facility] [phone], [phone]_ or:      [phone] and ask for the Resident On Call for:          ____Pediatric BMT, [phone]__________ (answered 24 hours a day)      Emergency Department:  [facility]: [phone]  [facility] [facility]: [phone]               Rev. 10/2014

## 2019-05-16 NOTE — PATIENT INSTRUCTIONS
Continue to make appointments based on post BMT follow up calendar.     No further follow up instructions as of 5/17/19 at 9:24am PANTERA

## 2019-05-16 NOTE — ANESTHESIA PREPROCEDURE EVALUATION
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)      WPW (Aaron-Parkinson-White syndrome) 2018      Past Surgical History:   Procedure Laterality Date     BONE MARROW BIOPSY, BONE SPECIMEN, NEEDLE/TROCAR Right 2018    Procedure: bone marrow biopsy;  Surgeon: Jacqueline Fitzgerald NP;  [facility]      BONE MARROW BIOPSY, BONE SPECIMEN, NEEDLE/TROCAR N/A 2019    Procedure: BIOPSY BONE MARROW;  Surgeon: Lisa Workman NP;  [facility]      ESOPHAGOSCOPY, GASTROSCOPY, DUODENOSCOPY (EGD), COMBINED N/A 2019    Procedure: Upper endoscopy with biopsy;  Surgeon: Magdalene Hobson MD;  [facility]      INSERT CATHETER VASCULAR ACCESS DOUBLE LUMEN CHILD N/A 10/26/2018    Procedure: double lumen tunneled line placement;  Surgeon: Rex Valente MD;  [facility]      IR CVC TUNNEL REMOVAL LEFT  2019     O CLAVICLE LEFT Left     fracture with surgical repair and plate/screws     ORTHOPEDIC SURGERY      femur     REMOVE CATHETER VASCULAR ACCESS N/A 2019    Procedure: Tunneled line removal;  Surgeon: Krystal Esparza MD;  [facility]           Anesthesia Evaluation    ROS/Med Hx    No history of anesthetic complications  Comments: Has tolerated anesthesia in the past.    18yM w VHR ALL s/p SCT w nausea and emesis for several weeks.    Cardiovascular Findings   Comments: WPW  - one episode of syncope.    EKG from 10/18: Sinus bradycardia w/short AK interval  ECHO 10/18: normal biventricular function  ziopath 10/18:  Cannot rule out pre-excitation at higher HR.    Neuro Findings - negative  "ROS    Pulmonary Findings - negative ROS    HENT Findings - negative HENT ROS    Skin Findings   (+) rash      GI/Hepatic/Renal Findings   (+) GERD  Comments: Hx of requiring TPN in the past    3-4wk n/v. He denies GERD sx currently.    Endocrine/Metabolic Findings - negative ROS      Genetic/Syndrome Findings - negative genetics/syndromes ROS    Hematology/Oncology Findings   (+) cancer, blood dyscrasia and hematopoietic stem cell transplant            PHYSICAL EXAM:   Mental Status/Neuro: A/A/O   Airway: Facies: Feasible  Mallampati: I  Mouth/Opening: Full  TM distance: > 6 cm  Neck ROM: Full   Respiratory: Auscultation: CTAB     Resp. Rate: Normal     Resp. Effort: Normal      CV: Rhythm: Regular  Rate: Age appropriate  Heart: Normal Sounds   Comments:      Dental: Normal                    Lab Results   Component Value Date    WBC 2.5 (L) 02/15/2019    HGB 10.2 (L) 02/15/2019    HCT 30.2 (L) 02/15/2019    PLT 94 (L) 02/15/2019     02/15/2019    POTASSIUM 4.1 02/15/2019    CHLORIDE 108 02/15/2019    CO2 24 02/15/2019    BUN 15 02/15/2019    CR 1.02 02/15/2019     (H) 02/15/2019    ELSA 9.1 02/15/2019    PHOS 3.6 02/15/2019    MAG 2.0 02/15/2019    ALBUMIN 4.1 02/15/2019    PROTTOTAL 7.5 02/15/2019     (H) 02/15/2019    AST 57 (H) 02/15/2019    ALKPHOS 109 02/15/2019    BILITOTAL 0.8 02/15/2019    PTT 34 02/08/2019    INR 1.21 (H) 02/08/2019    FIBR 384 11/26/2018         Preop Vitals  BP Readings from Last 3 Encounters:   05/16/19 100/72   02/22/19 95/61   02/15/19 109/76    Pulse Readings from Last 3 Encounters:   05/16/19 59   02/22/19 65   02/15/19 99      Resp Readings from Last 3 Encounters:   05/16/19 20   02/22/19 22   02/15/19 26    SpO2 Readings from Last 3 Encounters:   05/16/19 98%   02/22/19 100%   02/15/19 98%      Temp Readings from Last 1 Encounters:   05/16/19 36.4  C (97.6  F) (Oral)    Ht Readings from Last 1 Encounters:   05/16/19 1.753 m (5' 9\")      Wt Readings from Last 1 " "Encounters:   05/16/19 63.3 kg (139 lb 8.8 oz)    Estimated body mass index is 20.61 kg/m  as calculated from the following:    Height as of this encounter: 1.753 m (5' 9\").    Weight as of this encounter: 63.3 kg (139 lb 8.8 oz).     LDA:  Port A Cath Single Left Chest wall (Active)   Access Date 02/22/19 2/22/2019 10:25 AM   Access Attempts 1 2/22/2019 10:25 AM   Gauge/Length Power noncoring 90 degree bend;20 gauge;3/4 inch 2/22/2019 10:25 AM   Site Assessment WDL 2/22/2019 12:09 PM   Line Status Heparin locked 2/22/2019 12:09 PM   Extravasation? No 2/22/2019 12:09 PM   Dressing Intervention Transparent 2/22/2019 12:09 PM   Dressing change due 03/01/19 2/22/2019 10:25 AM   Needle Change Due 03/01/19 2/22/2019 10:25 AM   De-Access Date 02/22/19 2/22/2019 12:09 PM   Date to be Reflushed 03/22/19 2/22/2019 12:09 PM   Number of days:        Airway - Adult/Peds (Active)   Number of days: 202          Assessment:   ASA SCORE: 3    NPO Status: > 2 hours since completed Clear Liquids; > 6 hours since completed Solid Foods   Documentation: H&P complete; Preop Testing complete; Consents complete   Proceeding: Proceed without further delay  Tobacco Use:  NO Active use of Tobacco/UNKNOWN Tobacco use status     Plan:   Anes. Type:  General   Pre-Induction: Midazolam IV   Induction:  IV (Standard)   Airway: Native Airway   Access/Monitoring: PIV   Maintenance: Balanced; Propofol   Emergence: Procedure Site   Logistics: Same Day Surgery     Postop Pain/Sedation Strategy:  Standard-Options: Opioids PRN     PONV Management:  Adult Risk Factors:, Non-Smoker, Postop Opioids  Prevention: Propofol Infusion; Ondansetron     CONSENT: Direct conversation   Plan and risks discussed with: Patient   Blood Products: Consent Deferred (Minimal Blood Loss)               Fernando Gutierrez DO  "

## 2019-05-16 NOTE — PROCEDURES
Procedure/Surgery Information   General acute hospital, [place]     Interventional Radiology Brief Post Procedure Note    Procedure: left chest port removal     Proceduralist: Nhi Rockwell PA-C    Assistant: DEVENDRA Zepeda    Time Out: Prior to the start of the procedure and with procedural staff participation, I verbally confirmed the patient s identity using two indicators, relevant allergies, that the procedure was appropriate and matched the consent or emergent situation, and that the correct equipment/implants were available. Immediately prior to starting the procedure I conducted the Time Out with the procedural staff and re-confirmed the patient s name, procedure, and site/side. (The Joint Commission universal protocol was followed.)  Yes    Medications   Medication Event Details Admin User Admin Time       Sedation: Monitored Anesthesia Care (MAC) administered and documented by Anesthesia Care Provider    Findings: Existing left subclavian vein single lumen chest port removed intact and without difficulty. Retention sutures removed entirely.     Estimated Blood Loss: Less than 10 ml    Fluoroscopy Time: None    SPECIMENS: None    Complications: 1. None     Condition: Stable    Plan: Follow up per primary team.     Comments: See dictated procedure note for full details.    Link Rios PA-C

## 2019-05-16 NOTE — PROGRESS NOTES
Please see procedural note in OR encounter from 5/16/19 for bone marrow biopsy documentation.     Lilia Prabhakar MSN, CPNP-AC  Pediatric Blood and Marrow Transplant Program  [facility] [phone]  Pager [phone]

## 2019-05-16 NOTE — ANESTHESIA CARE TRANSFER NOTE
Patient: Artemio Nino    Procedure(s):  BIOPSY, BONE MARROW  REMOVAL, VASCULAR ACCESS PORT    Diagnosis: Acute Lymphoblastic Leukemia  Diagnosis Additional Information: No value filed.    Anesthesia Type:   No value filed.     Note:  Airway :Nasal Cannula  Patient transferred to:PACU  Comments: 96/58, HR 52, sat 100%, RR 16Handoff Report: Identifed the Patient, Identified the Reponsible Provider, Reviewed the pertinent medical history, Discussed the surgical course, Reviewed Intra-OP anesthesia mangement and issues during anesthesia, Set expectations for post-procedure period and Allowed opportunity for questions and acknowledgement of understanding      Vitals: (Last set prior to Anesthesia Care Transfer)    CRNA VITALS  5/16/2019 1428 - 5/16/2019 1507      5/16/2019             Pulse:  60    SpO2:  100 %                Electronically Signed By: SNOW James CRNA  May 16, 2019  3:07 PM

## 2019-05-16 NOTE — OR NURSING
Due to recent flare up of demodex, contact precautions ordered per infection control's recommendation.

## 2019-05-17 LAB
COPATH REPORT: NORMAL
COPATH REPORT: NORMAL

## 2019-05-20 DIAGNOSIS — I45.6 WPW (WOLFF-PARKINSON-WHITE SYNDROME): Primary | ICD-10-CM

## 2019-05-20 LAB
COPATH REPORT: NORMAL
COPATH REPORT: NORMAL

## 2019-05-21 LAB
COPATH REPORT: NORMAL

## 2019-05-24 ENCOUNTER — ONCOLOGY VISIT (OUTPATIENT)
Dept: TRANSPLANT | Facility: CLINIC | Age: 19
End: 2019-05-24
Attending: PEDIATRICS
Payer: COMMERCIAL

## 2019-05-24 VITALS
WEIGHT: 140.21 LBS | DIASTOLIC BLOOD PRESSURE: 70 MMHG | HEART RATE: 60 BPM | HEIGHT: 69 IN | OXYGEN SATURATION: 97 % | SYSTOLIC BLOOD PRESSURE: 109 MMHG | BODY MASS INDEX: 20.77 KG/M2 | RESPIRATION RATE: 20 BRPM | TEMPERATURE: 97.9 F

## 2019-05-24 DIAGNOSIS — C91.01 ACUTE LYMPHOBLASTIC LEUKEMIA (ALL) IN REMISSION (H): Primary | ICD-10-CM

## 2019-05-24 PROCEDURE — G0463 HOSPITAL OUTPT CLINIC VISIT: HCPCS | Mod: ZF

## 2019-05-24 ASSESSMENT — MIFFLIN-ST. JEOR: SCORE: 1633.5

## 2019-05-24 NOTE — NURSING NOTE
"Chief Complaint   Patient presents with     RECHECK     Patient is here today for ALL follow up     /70 (BP Location: Right arm, Patient Position: Fowlers, Cuff Size: Adult Regular)   Pulse 60   Temp 97.9  F (36.6  C) (Oral)   Resp 20   Ht 1.748 m (5' 8.82\")   Wt 63.6 kg (140 lb 3.4 oz)   SpO2 97%   BMI 20.81 kg/m      Donna Andrews LPN  May 24, 2019      "

## 2019-05-24 NOTE — PROGRESS NOTES
"May 24, 2019    Dear Dr. Paniagua,    We had the pleasure of seeing Artemio Nino in our Pediatric Blood and Marrow Transplant Clinic today. 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 He is here today with his dad for follow up.  He had his bone marrow last week.      Since we last saw Isael three months ago, he has been doing well.  He is eating well but continues with heartburn.  He has gained about 4 kg since the last time.  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  He has some dermatitis on his face, but otherwise his rashes have resolved.  He has not had any other issues.  He looks great currently.      Review of Systems: Pertinent positives include those mentioned in interval events. A complete review of systems was performed and is otherwise negative.       Family History: Unchanged from previous visit    Social History: He will be moving back in with his grandmother.     Medications:  Please see MAR     Physical Exam:  /70 (BP Location: Right arm, Patient Position: Fowlers, Cuff Size: Adult Regular)   Pulse 60   Temp 97.9  F (36.6  C) (Oral)   Resp 20   Ht 1.748 m (5' 8.82\")   Wt 63.6 kg (140 lb 3.4 oz)   SpO2 97%   BMI 20.81 kg/m    Karnofsky is 100  FEN: Sitting on exam table. Awake and alert and in no apparent distress. Father present.  HEENT full head of hair.    LYMPH: no adenopathy  CARD: Heart regular rate and rhythm. No murmurs, rubs or gallops. Cap refill 2 seconds.  RESP: Lungs clear in upper lobes, diminished in bases, shallow respirations.  No increased work of breathing, crackles or wheezes.   ABD: Non-distended, non-tender, no organomegaly  EXTREM: No edema noted  SKIN: Acne ion face    Labs:    Lab Results   Component Value Date    WBC 3.0 05/16/2019     Lab Results   Component Value Date    RBC 3.26 05/16/2019     Lab Results   Component Value Date    HGB 10.2 " 05/16/2019     Lab Results   Component Value Date    HCT 30.2 05/16/2019     No components found for: MCT  Lab Results   Component Value Date    MCV 93 05/16/2019     Lab Results   Component Value Date    MCH 31.3 05/16/2019     Lab Results   Component Value Date    MCHC 33.8 05/16/2019     Lab Results   Component Value Date    RDW 12.4 05/16/2019     Lab Results   Component Value Date     05/16/2019       BM: negative for leukemia, 100% donor.    Last Comprehensive Metabolic Panel:  Sodium   Date Value Ref Range Status   05/16/2019 142 133 - 144 mmol/L Final     Potassium   Date Value Ref Range Status   05/16/2019 4.2 3.4 - 5.3 mmol/L Final     Chloride   Date Value Ref Range Status   05/16/2019 112 (H) 94 - 109 mmol/L Final     Carbon Dioxide   Date Value Ref Range Status   05/16/2019 25 20 - 32 mmol/L Final     Anion Gap   Date Value Ref Range Status   05/16/2019 5 3 - 14 mmol/L Final     Glucose   Date Value Ref Range Status   05/16/2019 88 70 - 99 mg/dL Final     Urea Nitrogen   Date Value Ref Range Status   05/16/2019 12 7 - 30 mg/dL Final     Creatinine   Date Value Ref Range Status   05/16/2019 0.92 0.66 - 1.25 mg/dL Final     GFR Estimate   Date Value Ref Range Status   05/16/2019 >90 >60 mL/min/[1.73_m2] Final     Comment:     Non  GFR Calc  Starting 12/18/2018, serum creatinine based estimated GFR (eGFR) will be   calculated using the Chronic Kidney Disease Epidemiology Collaboration   (CKD-EPI) equation.       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 He is doing well.     BMT:   High risk B cell ALL (CNS negative) + JAK2 activation/BMT: bone marrow biopsy prior to transplant (10/15) MRD 0.006%.   Conditioning per 2015-29: TBI days -4 through -1 followed by matched sibling donor transplant on 11/2 Engrafted day +15,bone marrow biopsy showed 20-30% cellularity with trilineage hematopoesis, flow negative, 100% donor. He will have his next bone marrow biopsy and donor studies at 1 year and 2 years post transplant. He will not routinely need LPs.          GVHD: Post-transplant Cytoxan on days +3 and +4 and off tacrolimus. No signs of GVHD.    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 Currently taking Jakafi and his counts are stable.    FEN/Renal:  No issues.    GI:   Reflux: Continue protonix twice daily.    Pulmonary:  History of pneumomediastinum: finding upon work up Chest CT (10/22), asymptomatic. Per radiology, likely benign, perhaps transient. No issues.      Cardiovascular: Aaron-Parkinson-White Syndrome: diagnosed upon syncope in 02/2018. Asymptomatic, no interventions to date. Work up EKG c/w WPW, sinus bradycardia at 49 bpm. Work up ECHO normal with EF 68%.  No current concerns      Infectious Disease:  He should continue bactrim prophylaxis until 1 year post transplant. Will plan on one year vaccines at the next appointment.  His CD4 count is still relatively low at 193.       Derm: continue creams.      Disposition: RTC in 6 months for his one year bone marrow, labs, and exam with Dr. Zavala.       Sincerely,     Viky Zavala MD, PhD    Pediatric Blood and Marrow Transplant  [facility]

## 2019-05-24 NOTE — PATIENT INSTRUCTIONS
Return to [facility] for labs and exam with Dr. Viky Zavala for 1 year BAN November 2019  Infusion needs: none  Patient has NO line, to be drawn off of per lab.   Medication changes: none  Care plan changes: none  Contact information  During business hours (7:30am-4:30pm):   To leave a non-urgent voicemail: call triage line [phone]    To call for time-sensitive needs or concerns : call clinic  [phone]  Evenings after 4:30pm, weekends, and holidays:   For any needs or concerns: call for BMT fellow at [phone] in the case of an emergency  Thank you!     InFuture [employer] message sent to the BMT Complex Schedulers for follow up as of 5/28/19 at 11:27am L

## 2019-07-30 DIAGNOSIS — Z94.81 S/P ALLOGENEIC BONE MARROW TRANSPLANT (H): Primary | ICD-10-CM

## 2019-07-30 DIAGNOSIS — C91.01 ACUTE LYMPHOBLASTIC LEUKEMIA (ALL) IN REMISSION (H): ICD-10-CM

## 2019-07-30 DIAGNOSIS — C91.00 ALL (ACUTE LYMPHOBLASTIC LEUKEMIA OF INFANT) (H): ICD-10-CM

## 2019-08-29 NOTE — ANESTHESIA PREPROCEDURE EVALUATION
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)      WPW (Aaron-Parkinson-White syndrome) 2018      Past Surgical History:   Procedure Laterality Date     BONE MARROW BIOPSY, BONE SPECIMEN, NEEDLE/TROCAR Right 2018    Procedure: bone marrow biopsy;  Surgeon: Jacqueline Fitzgerald NP;  [facility]      BONE MARROW BIOPSY, BONE SPECIMEN, NEEDLE/TROCAR N/A 2019    Procedure: BIOPSY BONE MARROW;  Surgeon: Lisa Workman NP;  [facility]      INSERT CATHETER VASCULAR ACCESS DOUBLE LUMEN CHILD N/A 10/26/2018    Procedure: double lumen tunneled line placement;  Surgeon: Rex Valente MD;  [facility]      IR CVC TUNNEL REMOVAL LEFT  2019     O CLAVICLE LEFT Left     fracture with surgical repair and plate/screws     ORTHOPEDIC SURGERY      femur     REMOVE CATHETER VASCULAR ACCESS N/A 2019    Procedure: Tunneled line removal;  Surgeon: Krystal Esparza MD;  Location: [facility]           Anesthesia Evaluation    ROS/Med Hx    No history of anesthetic complications  Comments: Has tolerated anesthesia in the past.    18yM w VHR ALL s/p SCT w nausea and emesis for several weeks.    Cardiovascular Findings   Comments: WPW  - one episode of syncope.    EKG from 10/18: Sinus bradycardia w/short AK interval  ECHO 10/18: normal biventricular function  ziopath 10/18:  Cannot rule out pre-excitation at higher HR.    Neuro Findings - negative ROS    Pulmonary Findings - negative ROS  (-) recent URI    HENT Findings - negative HENT ROS    Skin Findings   (+) rash      GI/Hepatic/Renal Findings   (+) GERD  Comments: Hx of requiring TPN in the past    3-4wk n/v. He denies  "GERD sx currently.    Endocrine/Metabolic Findings - negative ROS      Genetic/Syndrome Findings - negative genetics/syndromes ROS    Hematology/Oncology Findings   (+) cancer, blood dyscrasia and hematopoietic stem cell transplant            PHYSICAL EXAM:   Mental Status/Neuro: A/A/O   Airway: Facies: Feasible  Mallampati: I  Mouth/Opening: Full  TM distance: > 6 cm  Neck ROM: Full   Respiratory: Auscultation: CTAB     Resp. Rate: Normal     Resp. Effort: Normal      CV: Rhythm: Regular  Rate: Age appropriate  Heart: Normal Sounds   Comments:      Dental: Normal                    Lab Results   Component Value Date    WBC 2.5 (L) 02/15/2019    HGB 10.2 (L) 02/15/2019    HCT 30.2 (L) 02/15/2019    PLT 94 (L) 02/15/2019     02/15/2019    POTASSIUM 4.1 02/15/2019    CHLORIDE 108 02/15/2019    CO2 24 02/15/2019    BUN 15 02/15/2019    CR 1.02 02/15/2019     (H) 02/15/2019    ELSA 9.1 02/15/2019    PHOS 3.6 02/15/2019    MAG 2.0 02/15/2019    ALBUMIN 4.1 02/15/2019    PROTTOTAL 7.5 02/15/2019     (H) 02/15/2019    AST 57 (H) 02/15/2019    ALKPHOS 109 02/15/2019    BILITOTAL 0.8 02/15/2019    PTT 34 02/08/2019    INR 1.21 (H) 02/08/2019    FIBR 384 11/26/2018         Preop Vitals  BP Readings from Last 3 Encounters:   02/15/19 109/76   02/08/19 103/56   02/08/19 111/78    Pulse Readings from Last 3 Encounters:   02/15/19 99   02/08/19 55   02/08/19 96      Resp Readings from Last 3 Encounters:   02/15/19 26   02/08/19 22   02/08/19 12    SpO2 Readings from Last 3 Encounters:   02/15/19 98%   02/08/19 100%   02/08/19 100%      Temp Readings from Last 1 Encounters:   02/15/19 37.2  C (98.9  F) (Oral)    Ht Readings from Last 1 Encounters:   02/15/19 1.75 m (5' 8.9\")      Wt Readings from Last 1 Encounters:   02/15/19 59.5 kg (131 lb 2.8 oz)    Estimated body mass index is 19.43 kg/m  as calculated from the following:    Height as of 2/15/19: 1.75 m (5' 8.9\").    Weight as of 2/15/19: 59.5 kg (131 lb 2.8 " oz).     LDA:  Port A Cath Single Left Chest wall (Active)   Access Date 02/08/19 2/8/2019 11:20 AM   Gauge/Length 20 gauge;Power noncoring 90 degree bend;3/4 inch 2/8/2019 11:20 AM   Site Assessment WDL 2/8/2019 12:00 PM   Line Status Heparin locked 2/8/2019 12:46 PM   Extravasation? No 2/8/2019 12:00 PM   Dressing Intervention Transparent 2/8/2019 12:00 PM   De-Access Date 02/08/19 2/8/2019 12:46 PM   Date to be Reflushed 12/15/18 11/27/2018 11:24 AM   Number of days:        Airway - Adult/Peds (Active)   Number of days: 118          Assessment:   ASA SCORE: 3    NPO Status: > 6 hours since completed Solid Foods   Documentation: H&P complete; Preop Testing complete; Consents complete   Proceeding: Proceed without further delay  Tobacco Use:  NO Active use of Tobacco/UNKNOWN Tobacco use status     Plan:   Anes. Type:  General   Pre-Induction: None   Induction:  IV (Standard)   Airway: Native Airway (RSI if GERD sx develop)   Access/Monitoring: PIV   Maintenance: Propofol; IV   Emergence: Recovery Site (PACU/ICU)   Logistics: Remote Procedure; Same Day Surgery     PONV Management:  Adult Risk Factors:, Non-Smoker  Prevention: Propofol Infusion; Ondansetron     CONSENT: Direct conversation   Plan and risks discussed with: Patient; Mother   Blood Products: Consent Deferred (Minimal Blood Loss)       Comments for Plan/Consent:  Discussed risks of anesthesia including nausea, vomiting, sore throat, dental damage, cardiopulmonary complications, aspiration, neurologic complications and serious complications.                 Yesenia Wallace MD

## 2019-09-04 ENCOUNTER — OFFICE VISIT (OUTPATIENT)
Dept: PEDIATRIC CARDIOLOGY | Facility: CLINIC | Age: 19
End: 2019-09-04
Attending: PEDIATRICS
Payer: COMMERCIAL

## 2019-09-04 ENCOUNTER — HOSPITAL ENCOUNTER (OUTPATIENT)
Dept: CARDIOLOGY | Facility: CLINIC | Age: 19
Discharge: HOME | End: 2019-09-04
Attending: PEDIATRICS | Admitting: PEDIATRICS
Payer: COMMERCIAL

## 2019-09-04 VITALS
DIASTOLIC BLOOD PRESSURE: 48 MMHG | SYSTOLIC BLOOD PRESSURE: 112 MMHG | WEIGHT: 149.25 LBS | OXYGEN SATURATION: 98 % | HEIGHT: 69 IN | RESPIRATION RATE: 14 BRPM | HEART RATE: 48 BPM | BODY MASS INDEX: 22.11 KG/M2

## 2019-09-04 DIAGNOSIS — I45.6 WPW (WOLFF-PARKINSON-WHITE SYNDROME): ICD-10-CM

## 2019-09-04 LAB — INTERPRETATION ECG - MUSE: NORMAL

## 2019-09-04 PROCEDURE — 93005 ELECTROCARDIOGRAM TRACING: CPT | Mod: ZF

## 2019-09-04 PROCEDURE — 93320 DOPPLER ECHO COMPLETE: CPT

## 2019-09-04 PROCEDURE — G0463 HOSPITAL OUTPT CLINIC VISIT: HCPCS | Mod: 25,ZF

## 2019-09-04 ASSESSMENT — MIFFLIN-ST. JEOR: SCORE: 1670.76

## 2019-09-04 NOTE — NURSING NOTE
"Chief Complaint   Patient presents with     RECHECK     WPW       /48 (BP Location: Right leg, Patient Position: Supine, Cuff Size: Adult Large)   Pulse (!) 48   Resp 14   Ht 5' 8.9\" (175 cm)   Wt 149 lb 4 oz (67.7 kg)   SpO2 98%   BMI 22.11 kg/m     /60 Right arm, supine Pulse 44    Latoya Johnson CMA  September 4, 2019  "

## 2019-09-04 NOTE — PROGRESS NOTES
2019    [place] [address]      Name: Artemio Nino  MRN: [id number]  : 2000      Dear Dr. Zavala,    I was pleased to see 19 year old Artemio Nino in Pediatric Cardiology Clinic at the [facility] on 19 for evaluation of cardiac function.  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  Artemio had only fainted once at that time, after receiving a shot.  He was evaluated by Pediatric Electrophysiology at [facility] with 3-day Holter and further study was deferred at that time.     He underwent BMT due to very high risk pre-B ALL, CNS negative that had failure of induction chemotherapy to induce remission.  Chemotherapy was with [facility] [id number] [facility]map treatment Group #1 - Arm A which included 4 doses of daunorubicin (44 mg each dose; 25mg/M2).    His conditioning protocol for BMT was  and included TBI days -4 through -1 followed by matched sibling donor transplant on 2018.  Per Dr. 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    Since his transplant he has done exceptionally well.  He has not been hospitalized and he has returned to an active life, including playing soccer.  He is off steroids and tacrolimus.  He denies syncope, chest pain or palpitations.  He is on no cardiac medications.    Past medical history is unremarkable except for   Patient Active Problem List   Diagnosis     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)     Aaron-Parkinson-White (WPW) syndrome     Bone marrow transplant candidate     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)     At risk for infection  "    S/P allogeneic bone marrow transplant (H)     Family history is remarkable for the paternal grandfather, a non-smoker, who had a myocardial infarction at age 47 or 48.  Tanya's  father has hypertension.  Artemio's maternal grandmother has hypertension and his maternal grandfather has had bypass surgery at age 76.    Current medications include:  Current Outpatient Medications   Medication     metroNIDAZOLE (METROCREAM) 0.75 % external cream     pantoprazole (PROTONIX) 40 MG EC tablet     ruxolitinib 10 MG TABS tablet CHEMO     sulfamethoxazole-trimethoprim (BACTRIM DS/SEPTRA DS) 800-160 MG per tablet     tacrolimus (PROTOPIC) 0.1 % external ointment     No current facility-administered medications for this visit.        Current known allergies include:  Allergies   Allergen Reactions     Blood Transfusion Related (Informational Only) Other (See Comments)     Patient has a history of a clinically significant antibody against RBC antigens.  A delay in compatible RBCs may occur.Stem cell transplant patient.  Give type O RBCs.     No Known Drug Allergies        Vital signs:  Vitals:    09/04/19 1408   BP: 106/63   BP Location: Right arm   Patient Position: Sitting   Cuff Size: Adult Regular   Pulse: 57   Resp: 14   SpO2: 98%   Weight: 67.7 kg (149 lb 4 oz)   Height: 1.75 m (5' 8.9\")     Blood pressure percentiles are not available for patients who are 18 years or older.  Wt Readings from Last 3 Encounters:   09/04/19 67.7 kg (149 lb 4 oz)   05/24/19 63.6 kg (140 lb 3.4 oz)   05/16/19 63.3 kg (139 lb 8.8 oz)     Ht Readings from Last 2 Encounters:   09/04/19 1.75 m (5' 8.9\")   05/24/19 1.748 m (5' 8.82\")     Normalized BMI data available only for age 0 to 20 years.    Physical Examination:  On physical exam today Artemio was a healthy-appearing acyanotic engaging young man in no distress.  Chest was clear to auscultation. Cardiac exam revealed normal first and second heart sounds.  The second heart sound was " normal in intensity.  No murmur rub or gallop was heard. Hepatic edge was deferred.  Pulses were 2+ in right upper and right lower extremities.    EKG  The EKG today showed sinus bradycardia at a rate of 48 beats/minute.  NE interval was short at 116 msec; QRS duration was prolonged to 120 msec; QTc interval was normal at 414 msec.  WPW with delta wave was present.  QRS axis was normal at +87 degrees.  There were no ST-T wave changes present.    Cardiac Echo   Normal echocardiogram. There is normal appearance and motion of the tricuspid,mitral, pulmonary and aortic valves. No atrial, ventricular or arterial levelshunting. The calculated biplane left ventricular ejection  fraction is 65-70%. Normal right and left ventricular size and function.    In summary, Artemio has undergone chemotherapy with daunorubicin (176 mg by my calculation, or 50 mg/M2 of doxorubicin equivalent dosage).  In addition he received 1320 cGy TBI with a 400 cGy testicular boost.  In addition he has WPW from which he has been asymptomatic.  It is my impression that his interval history, exam and echo are all within normal limits.  He continues to have WPW on EKG but without any known tachycardia.  He has a family history of early cardiovascular disease in his grandfather.  Within the next few months we should obtain a fasting lipid profile on him and repeat his 24-EKG monitor.      Artemio  does not require SBE prophylaxis for dental or contaminated procedures.  Artemio may be allowed activity ad jose de jesus to his own limits.  He should contact us for any fainting or palpitations or symptoms of concern to him.   Per COG guidelines, with >or= 15 Gy of radiation, he should be seen by cardiology every 2 years.  At this point, it is time for him to be transferred to the Adult Cardiology service for continued care.  I did recommend follow-up in Adult Cardiology Clinic with an Echo and EKG in 2 years.    Thank you for allowing me to participate in  Kory brito.  If you have any questions or concerns, please feel free to contact me.    Sincerely,    Ju Plummer MD, PhD  Professor of Pediatrics  [phone]    Cc: Artemio Nino

## 2019-09-04 NOTE — PATIENT INSTRUCTIONS
PEDS CARDIOLOGY  [facility] [address] [place] [address]  [address] [facility] [id number] [phone]      Cardiology Clinic  [phone]  RN Care Coordinator, Maryan Addison (Bre) or Lanny Barrios  [phone]  Pediatric Call Center/Scheduling  [phone]    After Hours and Emergency Contact Number  [phone]  * Ask for the pediatric cardiologist on call         Prescription Renewals  The pharmacy must fax requests to [phone]  * Please allow 3-4 days for prescriptions to be authorized

## 2019-09-04 NOTE — LETTER
2019      RE: Artemio Nino  [address] [facility] [id number]       2019    [place] [address]      Name: Artemio Nino  MRN: [id number]  : 2000      Dear Dr. Zavala,    I was pleased to see 19 year old Artemio Nino in Pediatric Cardiology Clinic at the [facility] on 19 for evaluation of cardiac function.  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  Artemio had only fainted once at that time, after receiving a shot.  He was evaluated by Pediatric Electrophysiology at [facility] with 3-day Holter and further study was deferred at that time.     He underwent BMT due to very high risk pre-B ALL, CNS negative that had failure of induction chemotherapy to induce remission.  Chemotherapy was with [facility] [id number] Roadmap treatment Group #1 - Arm A which included 4 doses of daunorubicin (44 mg each dose; 25mg/M2).    His conditioning protocol for BMT was  and included TBI days -4 through -1 followed by matched sibling donor transplant on 2018.  Per Dr. 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    Since his transplant he has done exceptionally well.  He has not been hospitalized and he has returned to an active life, including playing soccer.  He is off steroids and tacrolimus.  He denies syncope, chest pain or palpitations.  He is on no cardiac medications.    Past medical history is unremarkable except for   Patient Active Problem List   Diagnosis     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)     Aaron-Parkinson-White (WPW) syndrome     Bone marrow transplant  "candidate     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)     At risk for infection     S/P allogeneic bone marrow transplant (H)     Family history is remarkable for the paternal grandfather, a non-smoker, who had a myocardial infarction at age 47 or 48.  Tanya's  father has hypertension.  Artemio's maternal grandmother has hypertension and his maternal grandfather has had bypass surgery at age 76.    Current medications include:  Current Outpatient Medications   Medication     metroNIDAZOLE (METROCREAM) 0.75 % external cream     pantoprazole (PROTONIX) 40 MG EC tablet     ruxolitinib 10 MG TABS tablet CHEMO     sulfamethoxazole-trimethoprim (BACTRIM DS/SEPTRA DS) 800-160 MG per tablet     tacrolimus (PROTOPIC) 0.1 % external ointment     No current facility-administered medications for this visit.        Current known allergies include:  Allergies   Allergen Reactions     Blood Transfusion Related (Informational Only) Other (See Comments)     Patient has a history of a clinically significant antibody against RBC antigens.  A delay in compatible RBCs may occur.Stem cell transplant patient.  Give type O RBCs.     No Known Drug Allergies        Vital signs:  Vitals:    09/04/19 1408   BP: 106/63   BP Location: Right arm   Patient Position: Sitting   Cuff Size: Adult Regular   Pulse: 57   Resp: 14   SpO2: 98%   Weight: 67.7 kg (149 lb 4 oz)   Height: 1.75 m (5' 8.9\")     Blood pressure percentiles are not available for patients who are 18 years or older.  Wt Readings from Last 3 Encounters:   09/04/19 67.7 kg (149 lb 4 oz)   05/24/19 63.6 kg (140 lb 3.4 oz)   05/16/19 63.3 kg (139 lb 8.8 oz)     Ht Readings from Last 2 Encounters:   09/04/19 1.75 m (5' 8.9\")   05/24/19 1.748 m (5' 8.82\")     Normalized BMI data available only for age 0 to 20 years.    Physical Examination:  On physical exam today Artemio was a healthy-appearing acyanotic engaging young man in no distress.  Chest was clear to auscultation. " Cardiac exam revealed normal first and second heart sounds.  The second heart sound was normal in intensity.  No murmur rub or gallop was heard. Hepatic edge was deferred.  Pulses were 2+ in right upper and right lower extremities.    EKG  The EKG today showed sinus bradycardia at a rate of 48 beats/minute.  NM interval was short at 116 msec; QRS duration was prolonged to 120 msec; QTc interval was normal at 414 msec.  WPW with delta wave was present.  QRS axis was normal at +87 degrees.  There were no ST-T wave changes present.    Cardiac Echo   Normal echocardiogram. There is normal appearance and motion of the tricuspid,mitral, pulmonary and aortic valves. No atrial, ventricular or arterial levelshunting. The calculated biplane left ventricular ejection  fraction is 65-70%. Normal right and left ventricular size and function.    In summary, Artemio has undergone chemotherapy with daunorubicin (176 mg by my calculation, or 50 mg/M2 of doxorubicin equivalent dosage).  In addition he received 1320 cGy TBI with a 400 cGy testicular boost.  In addition he has WPW from which he has been asymptomatic.  It is my impression that his interval history, exam and echo are all within normal limits.  He continues to have WPW on EKG but without any known tachycardia.  He has a family history of early cardiovascular disease in his grandfather.  Within the next few months we should obtain a fasting lipid profile on him and repeat his 24-EKG monitor.      Artemio  does not require SBE prophylaxis for dental or contaminated procedures.  Artemio may be allowed activity ad jose de jesus to his own limits.  He should contact us for any fainting or palpitations or symptoms of concern to him.   Per COG guidelines, with >or= 15 Gy of radiation, he should be seen by cardiology every 2 years.  At this point, it is time for him to be transferred to the Adult Cardiology service for continued care.  I did recommend follow-up in Adult Cardiology  Clinic with an Echo and EKG in 2 years.    Thank you for allowing me to participate in Artemio's care.  If you have any questions or concerns, please feel free to contact me.    Sincerely,    Ju Plummer MD, PhD  Professor of Pediatrics  [phone]    Cc: Artemio Nino

## 2019-09-27 ENCOUNTER — OFFICE VISIT (OUTPATIENT)
Dept: DERMATOLOGY | Facility: CLINIC | Age: 19
End: 2019-09-27
Attending: DERMATOLOGY
Payer: COMMERCIAL

## 2019-09-27 VITALS — HEIGHT: 69 IN | BODY MASS INDEX: 21.81 KG/M2 | WEIGHT: 147.27 LBS

## 2019-09-27 DIAGNOSIS — L70.0 ACNE VULGARIS: Primary | ICD-10-CM

## 2019-09-27 PROCEDURE — G0463 HOSPITAL OUTPT CLINIC VISIT: HCPCS | Mod: ZF

## 2019-09-27 RX ORDER — TRETINOIN 0.25 MG/G
CREAM TOPICAL
Qty: 45 G | Refills: 3 | Status: SHIPPED | OUTPATIENT
Start: 2019-09-27 | End: 2020-07-16

## 2019-09-27 ASSESSMENT — PAIN SCALES - GENERAL: PAINLEVEL: NO PAIN (0)

## 2019-09-27 ASSESSMENT — MIFFLIN-ST. JEOR: SCORE: 1661.76

## 2019-09-27 NOTE — NURSING NOTE
"[facility] [[id number]]  Chief Complaint   Patient presents with     RECHECK     follow up demodex reaction     Initial Ht 5' 8.9\" (175 cm)   Wt 147 lb 4.3 oz (66.8 kg)   BMI 21.81 kg/m   Estimated body mass index is 21.81 kg/m  as calculated from the following:    Height as of this encounter: 5' 8.9\" (175 cm).    Weight as of this encounter: 147 lb 4.3 oz (66.8 kg).  Medication Reconciliation: complete  "

## 2019-09-27 NOTE — PROGRESS NOTES
"Pediatric Dermatology Return Patient Visit     Dermatology problem list  1. 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  2. Demodex dermatitis - s/p Ivermectin, soolantra/protopic     Referring Physician: HANNAH Santos  CC:demodex hypersensitivity and acne    HPI: Isael is a 19 year old male with a hx of B-cell ALL s/p sibling BMT November 2018 who was seen today in follow pu for facial rash. Initially in the window of immunosuppression from the BMT, he had a demodex hypersensitivity reaction. This resolved with oral and then topical ivermectin. However Artemio notices that he has more acne like bumps on the forehead and cheeks now that seem to worsen if he does not use the ivermectin. He is otherwise doing well, denies chest and back involvement.          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)      WPW (Aaron-Parkinson-White syndrome) 03/2018       Family History: Reviewed, unchanged.  Social History: Currently working at a restaurant    Medications:   Current Outpatient Medications   Medication     sulfamethoxazole-trimethoprim (BACTRIM DS/SEPTRA DS) 800-160 MG per tablet     tretinoin (RETIN-A) 0.025 % external cream     metroNIDAZOLE (METROCREAM) 0.75 % external cream     pantoprazole (PROTONIX) 40 MG EC tablet     ruxolitinib 10 MG TABS tablet CHEMO     tacrolimus (PROTOPIC) 0.1 % external ointment     No current facility-administered medications for this visit.        Allergies: NKDA     ROS: a 10 point review of systems including constitutional, HEENT, CV, GI, musculoskeletal, Neurologic, Endocrine, Respiratory, Hematologic and Allergic/Immunologic was performed and was negative.     Physical examination: Ht 5' 8.9\" (175 cm)   Wt 66.8 kg (147 lb 4.3 oz)   BMI 21.81 kg/m      General: " Well-developed, well-nourished in no apparent distress  Eyes: lids, conjunctivae normal  Neck: supple  Respiratory: breathing comfortably  Skin: focused skin examination of the scalp, face, neck, chest, abdomen, back, right and left arms, hands, fingers, ears, eyelids, and lips. A full body exam was deferred by Artemio today but we will plan on this in 6 months.    This was notable for: several light pink acneiform papules on the forehead and cheeks. No open or closed comedones, no clear pustules. Chest and back clear. No scaling no eyelid involvement.              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 I reassured him that there is no clear evidence of this today given that there is no erythema, scale or eyelash involvement. Rather, lesions appear more acneiform. I recommended discontinuing protopic oint and continued cares aimed at acne vulgaris.     Recommendations  - Continue with gentle cleanser or CLN of the head and neck daily  -apply tretinoin 0.025% cream nightly as tolerated in a small amount all over the face - directions/instructions provided.  -in the am after cleansing, apply the topical ivermectin a few times weekly  -an oil free moisturizer can be used if irritation or dryness develops  - in 6 months Artemio will be due for a full skin examination, this is recommended annually for BMT patients.      Follow up in 6 months, sooner prn.         Abdirizak Delacruz MD  , Pediatric Dermatology

## 2019-09-27 NOTE — LETTER
9/27/2019      RE: Artemio Nino  [address] [facility] [facility]  [place] [address]       Pediatric Dermatology Return Patient Visit     Dermatology problem list  1. 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  2. Demodex dermatitis - s/p Ivermectin, soolantra/protopic     Referring Physician: HANNAH Santos  CC:demodex hypersensitivity and acne    HPI: Isael is a 19 year old male with a hx of B-cell ALL s/p sibling BMT November 2018 who was seen today in follow pu for facial rash. Initially in the window of immunosuppression from the BMT, he had a demodex hypersensitivity reaction. This resolved with oral and then topical ivermectin. However Artemio notices that he has more acne like bumps on the forehead and cheeks now that seem to worsen if he does not use the ivermectin. He is otherwise doing well, denies chest and back involvement.          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)      WPW (Aaron-Parkinson-White syndrome) 03/2018       Family History: Reviewed, unchanged.  Social History: Currently working at a restaurant    Medications:   Current Outpatient Medications   Medication     sulfamethoxazole-trimethoprim (BACTRIM DS/SEPTRA DS) 800-160 MG per tablet     tretinoin (RETIN-A) 0.025 % external cream     metroNIDAZOLE (METROCREAM) 0.75 % external cream     pantoprazole (PROTONIX) 40 MG EC tablet     ruxolitinib 10 MG TABS tablet CHEMO     tacrolimus (PROTOPIC) 0.1 % external ointment     No current facility-administered medications for this visit.        Allergies: NKDA     ROS: a 10 point review of systems including constitutional, HEENT, CV, GI, musculoskeletal, Neurologic, Endocrine, Respiratory, Hematologic and Allergic/Immunologic was performed and was negative.     Physical examination: Ht 5'  "8.9\" (175 cm)   Wt 66.8 kg (147 lb 4.3 oz)   BMI 21.81 kg/m       General: Well-developed, well-nourished in no apparent distress  Eyes: lids, conjunctivae normal  Neck: supple  Respiratory: breathing comfortably  Skin: focused skin examination of the scalp, face, neck, chest, abdomen, back, right and left arms, hands, fingers, ears, eyelids, and lips. A full body exam was deferred by Artemio today but we will plan on this in 6 months.    This was notable for: several light pink acneiform papules on the forehead and cheeks. No open or closed comedones, no clear pustules. Chest and back clear. No scaling no eyelid involvement.              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 I reassured him that there is no clear evidence of this today given that there is no erythema, scale or eyelash involvement. Rather, lesions appear more acneiform. I recommended discontinuing protopic oint and continued cares aimed at acne vulgaris.     Recommendations  - Continue with gentle cleanser or CLN of the head and neck daily  -apply tretinoin 0.025% cream nightly as tolerated in a small amount all over the face - directions/instructions provided.  -in the am after cleansing, apply the topical ivermectin a few times weekly  -an oil free moisturizer can be used if irritation or dryness develops  - in 6 months Artemio will be due for a full skin examination, this is recommended annually for BMT patients.      Follow up in 6 months, sooner prn.         Abdirizak Delacruz MD  , Pediatric Dermatology      Abdirizak Delacruz MD  "

## 2019-09-27 NOTE — PATIENT INSTRUCTIONS
[facility]- Pediatric Dermatology  Dr. Laura Schaffer, Dr. Abdirizak Delacruz, Dr. Jazmín Wang, HANNAH Gallo Dr., Dr. Wanda Bowden & Dr. Alvaro Leon       Non Urgent  Nurse Triage Line; [phone]- Nica and Harriet VILLAGRAN Care Coordinators      [facility] Pediatric Dermatology Specialty - [phone]      If you need a prescription refill, please contact your pharmacy. Refills are approved or denied by our Physicians during normal business hours, Monday through Fridays    Per office policy, refills will not be granted if you have not been seen within the past year (or sooner depending on your child's condition)      Scheduling Information:     Pediatric Appointment Scheduling and Call Center [phone]   Radiology Scheduling- [phone]     Sedation Unit Scheduling- [phone]    [facility] Scheduling- General [phone]; Pediatric Dermatology [phone]    Main  Services: [phone]   Malawian: [phone]   Monegasque: [phone]   Hmong/Dominican/Cook Islander: [phone]      Preadmission Nursing Department Fax Number: [phone] (Fax all pre-operative paperwork to this number)      For urgent matters arising during evenings, weekends, or holidays that cannot wait for normal business hours please call [phone] and ask for the Dermatology Resident On-Call to be paged.       Artemio, your skin looks good! There is no sign of demodex hypersensitivity today.  You should wash your face with a gentle cleanser like cetaphil 2x day or the C:Ln wash  Nightly as tolerated apply a very thin layer of the tretinoin 0.025% cream, this is an acne cream that will help with the small bumps that are present now on the cheeks. You may continue the topical ivermectin a few times weekly in the AM as a preventive measure.     I will see you in 6 months        WHAT IS ACNE AND WHY DO I HAVE PIMPLES?    The medical term for  pimples  is acne. Most people get at  least some acne, especially during their teenage years. Why you get acne is complicated. One common belief is that acne comes from being dirty. This is not true; rather, acne is the result of changes that occur during puberty.    Your skin is made of layers. To keep the skin from getting dry, the skin makes oil in little wells called  sebaceous glands  that are found in the deeper layers of the skin.  Whiteheads  or  blackheads  are clogged sebaceous glands.  Blackheads  are not caused by dirt blocking the pores, but rather by oxidation (a chemical reaction that occurs when the oil reacts with oxygen in the air). People with acne have glands that make more oil and are more easily plugged, causing the glands to swell. Hormones, bacteria (called P. acnes) and your family s likelihood to have acne (genetic susceptibility) also play a role.    Skin Hygiene  Washing your face is part of taking good care of your skin. Good skin care habits are important and support the medications your doctor prescribes for your acne.     Wash your face twice a day, once in the morning and once in the evening (which includes any showers you take).     Avoid over-washing/ over-scrubbing your face as this will not improve the acne and may lead to dryness and irritation, which can interfere with your medications.     In general, milder soaps and cleansers are better for acne-prone skin. The soaps labeled  for sensitive skin  are milder than those labeled  deodorant soap.        Acne washes  may contain salicylic acid. Salicylic acid fights oil and bacteria mildly but can be drying and can add to irritation, so hold off using it unless recommended by your doctor. Scrubbing with a washcloth or loofah is also not advised as this can irritate and inflame your acne.     If you use makeup or sunscreen make sure that these products are labeled  won t clog pores  or  won t cause acne  or  non-comedogenic,  which means it will not cause or worsen  acne.    Try not to  pop pimples  or pick at your acne, as this can delay healing and may lead to scarring or leave dark spots behind. Picking/popping acne can also cause a serious infection.    Wash or change your pillow case 1-2 times per week, especially if you use hair products.    If you play sports, try to wash right away when you are done. Also, pay attention to how your sports equipment (shoulder pads, helmet strap, etc.) might rub against your skin and be making your acne worse!    Acne Medications  If you have acne and the over the counter products are not working, you may need a prescription medication to help. Your doctor will tell you if you are one of those people. The good news is that acne treatments work really well when used properly.    WHAT CAN I DO TO HELP THE ACNE GO AWAY?    Some lifestyle changes can be beneficial in helping acne as well. Stress is known to aggravate acne, so try to get enough sleep and daily exercise. It is also important to eat a balanced diet. Some people feel that certain foods (like pizza, soda or chocolate) worsen their acne. While there aren t many studies available on this question, strict dietary changes are unlikely to be helpful and may be harmful to your health. If you find that a certain food seems to aggravate your acne, you may consider avoiding that food.  HOW SHOULD I USE MY ACNE MEDICATIONS?    Acne is a common condition that may vary in severity. A number of topical and/or oral medications can be used for its treatment. Two to three months of consistent daily treatment is often needed to see maximal effect from a treatment regimen. That is how long it takes the skin layers to shed fully and recycle or  grow out.  Remember that acne medications are supposed to prevent acne, and the goal is maintaining clear skin. Talk to your doctor if you are not using your acne medications as you had originally discussed. Let them know any problems you are having. Common  reasons for people to not use their medications include the following:    I used the medication prescribed by my doctor before and it did not work then; why should I use it again now?    The medication I was prescribed cost too much!    I did not like the way the medication felt on my skin. For example, it left my skin too dry or too greasy!    The medication was too hard to use!    I can t remember to do it!    The medication had side effects that I did not like!    The acne plan was too complicated; I need something simpler to do!    TIPS FOR USING YOUR ACNE MEDICATIONS CORRECTLY      Apply your medication to clean, dry skin.      Apply the medicine to the entire area of your face that gets acne. The medications work by preventing new breakouts. Spot treatment of individual pimples does not do much.     Sometimes it is the combination of medicines that helps make the acne go away, not any single medication. Just because one medication may not have worked before does not mean it won t work when used in combination with another.     The medications are not vanishing creams (they are not magic!) - they take weeks to months to work. Be patient and use your medicine on a daily basis or as directed for six weeks before you ask whether your skin looks better. Try not to miss more than one or two days each week.    Don t stop putting on the medicine just because the acne is better. Remember that the acne is better because of the medication, and prevention is the key.    PREGNANCY AND ACNE TREATMENT    If you are pregnant, planning pregnancy or breastfeeding, please discuss with your doctor as your acne medication regimen may need to be altered.      Contributing SPD members: Maria Guadalupe Campos MD; Kamilah Carbajal MD; Leticia Quintanilla MD; Lilia Macias MD; Marleni Mcbride MD  Committee Reviewers: Pato Schumacher MD; Qing Cuevas MD  Expert Reviewer: Gagandeep Sy MD

## 2019-10-29 ENCOUNTER — OFFICE VISIT (OUTPATIENT)
Dept: NEUROPSYCHOLOGY | Facility: CLINIC | Age: 19
End: 2019-10-29
Attending: PSYCHOLOGIST
Payer: COMMERCIAL

## 2019-10-29 DIAGNOSIS — C91.01 ACUTE LYMPHOID LEUKEMIA IN REMISSION (H): Primary | ICD-10-CM

## 2019-11-05 ENCOUNTER — HEALTH MAINTENANCE LETTER (OUTPATIENT)
Age: 19
End: 2019-11-05

## 2019-11-06 ENCOUNTER — ONCOLOGY VISIT (OUTPATIENT)
Dept: TRANSPLANT | Facility: CLINIC | Age: 19
End: 2019-11-06
Attending: PEDIATRICS
Payer: COMMERCIAL

## 2019-11-06 ENCOUNTER — HOSPITAL ENCOUNTER (OUTPATIENT)
Dept: GENERAL RADIOLOGY | Facility: CLINIC | Age: 19
End: 2019-11-06
Attending: PEDIATRICS
Payer: COMMERCIAL

## 2019-11-06 ENCOUNTER — ANCILLARY PROCEDURE (OUTPATIENT)
Dept: BONE DENSITY | Facility: CLINIC | Age: 19
End: 2019-11-06
Attending: PEDIATRICS
Payer: COMMERCIAL

## 2019-11-06 VITALS
SYSTOLIC BLOOD PRESSURE: 110 MMHG | OXYGEN SATURATION: 99 % | DIASTOLIC BLOOD PRESSURE: 78 MMHG | HEIGHT: 69 IN | WEIGHT: 152.12 LBS | RESPIRATION RATE: 20 BRPM | HEART RATE: 47 BPM | TEMPERATURE: 97.3 F | BODY MASS INDEX: 22.53 KG/M2

## 2019-11-06 DIAGNOSIS — Z94.81 S/P ALLOGENEIC BONE MARROW TRANSPLANT (H): Primary | ICD-10-CM

## 2019-11-06 DIAGNOSIS — Z76.82 BONE MARROW TRANSPLANT CANDIDATE: Primary | ICD-10-CM

## 2019-11-06 DIAGNOSIS — C91.01 ACUTE LYMPHOBLASTIC LEUKEMIA (ALL) IN REMISSION (H): ICD-10-CM

## 2019-11-06 DIAGNOSIS — C91.00 ALL (ACUTE LYMPHOBLASTIC LEUKEMIA OF INFANT) (H): ICD-10-CM

## 2019-11-06 DIAGNOSIS — Z94.81 S/P ALLOGENEIC BONE MARROW TRANSPLANT (H): ICD-10-CM

## 2019-11-06 PROCEDURE — 77080 DXA BONE DENSITY AXIAL: CPT

## 2019-11-06 PROCEDURE — 77072 BONE AGE STUDIES: CPT

## 2019-11-06 PROCEDURE — G0463 HOSPITAL OUTPT CLINIC VISIT: HCPCS | Mod: 25

## 2019-11-06 PROCEDURE — 94375 RESPIRATORY FLOW VOLUME LOOP: CPT | Mod: ZF

## 2019-11-06 PROCEDURE — 94150 VITAL CAPACITY TEST: CPT | Mod: ZF

## 2019-11-06 PROCEDURE — 94726 PLETHYSMOGRAPHY LUNG VOLUMES: CPT | Mod: ZF

## 2019-11-06 PROCEDURE — 94729 DIFFUSING CAPACITY: CPT | Mod: ZF

## 2019-11-06 ASSESSMENT — MIFFLIN-ST. JEOR: SCORE: 1684.99

## 2019-11-06 NOTE — PROGRESS NOTES
Please see pediatric sedation encounter for his bone marrow biopsy procedural note.     BENJA Rose-AC  [facility] Blood and Marrow Transplant  [address] [address]  [phone]  [phone]

## 2019-11-06 NOTE — PATIENT INSTRUCTIONS
Return to [facility] for labs and exam with Dr. Viky Zavala and pharmacy consultation for 1.5 year BAN in May 2020.     Infusion needs: none    Patient has NO line, to be drawn off of per lab.     Medication changes: stop ruxolitinib and bactrim    Care plan changes: follow-up in 6 months    Contact information  During business hours (7:30am-4:30pm):   To leave a non-urgent voicemail: call triage line [phone]    To call for time-sensitive needs or concerns : call clinic  [phone]    Evenings after 4:30pm, weekends, and holidays:   For any needs or concerns: call for BMT fellow at [phone] in the case of an emergency    Thank you!     [employer] message sent to the BMT Complex Schedulers for follow up as of 11/7/19 at 1238pm SLL

## 2019-11-06 NOTE — PROGRESS NOTES
"November 6, 2019    Dear Dr. Paniagua,    We had the pleasure of seeing Artemio Nino in our Pediatric Blood and Marrow Transplant Clinic today. 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 He is here today with his dad for follow up. He is scheduled to have his one year post BMT surveillance bone marrow biopsy on 11/7.     Since we last saw Isael six months ago, he has been doing very well. He is eating well and previously reported heartburn symptoms have abated. He has had no significant illnesses or hospital visits since his last review. He denies fever / vomiting / diarrhea. He still has some dermatitis on his face that is reviewed by dermatology and is controlled but has no other rashes. His current medications include Bactrim and Ruxolitinib. He is working currently in [employer] and enjoys it. He is living with his grandmother for the past few months and is now back to playing soccer regularly, reports no problems keeping up. He does report continuing recreational use of marijuana (smoking) but does not smoke tobacco and consumes alcohol very infrequently (once every few weeks). He also has a history of WPW syndrome and had his most recent review with cardiology (Dr. Plummer) in late October 2019. Echocardiogram was unremarkable and EKG noted no new changes.      Review of Systems: Pertinent positives include those mentioned in interval events. A complete review of systems was performed and is otherwise negative.       Family History: Unchanged from previous visit    Social History: Has moved back in with his grandmother.     Medications:  Please see MAR     Physical Exam:  /78 (BP Location: Right arm, Patient Position: Fowlers, Cuff Size: Adult Large)   Pulse (!) 47   Temp 97.3  F (36.3  C) (Oral)   Resp 20   Ht 1.752 m (5' 8.98\")   Wt 69 kg (152 lb 1.9 oz)   SpO2 99%   BMI 22.48 kg/m  "   Karnofsky is 100  FEN: Sitting on exam table. Awake and alert and in no apparent distress. Father present.  HEENT full head of hair.    LYMPH: no adenopathy  CARD: Regular rate and rhythm. Heart sounds dual, no murmurs, rubs or gallops. Cap refill <2 seconds.  RESP:Clear on auscultation, good bilateral air entry.  No increased work of breathing, crackles or wheezes.   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 He has continued to do very well. He is currently Ruxolitinib and Bactrim post-BMT. No evidence of GvHD and is scheduled for one-year disease surveillance bone marrow assessment tomorrow.     We are very pleased with Isael's progress since his last review with us. We have recommenced cessation of Ruxolitinib (Jakafi) and Bactrim therapy. We would plan to commence post transplant vaccination 6 months post cessation of Ruxolitinib. We reiterated our recommendation regarding the risks associated with smoking / recreational use of marijuana and advised Isael to consider cessation. The importance of scrupulous attention to sun-protection measures was also emphasized.     ALL and BMT: High risk B cell ALL (CNS negative) + JAK2 activation / BMT: bone marrow biopsy prior to transplant (10/15) MRD 0.006%. Conditioning per 2015-29: TBI days -4 through -1 followed by matched sibling donor transplant on 11/2 Engrafted day +15,bone marrow biopsy showed 20-30% cellularity with trilineage hematopoiesis, flow negative,100% donor.   Day +180 engraftment: CD33 - 100% donor, Cd3 - 99% donor; BM examination showed no evidence of ALL.    11/6: To cease Ruxolitinib (Jakafi). One year post transplant BM and engraftment studies scheduled to be done on 11/7. He will have his next  bone marrow biopsy and donor studies at 2 years post transplant. He will not routinely need LPs.      GVHD: Post-transplant Cytoxan on days +3 and +4 and now off tacrolimus. No signs of GvHD to date.    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 Has been taking Jakafi post-transplant and his counts have remained stable.    FEN/Renal:  No issues.    GI:  Previous history of reflux and heartburn. Now no longer an issue and not on any therapy currently.    ID: Currently on Bactrim for PJP prophylaxis, this can now be ceased as of 11/6/19. Routine post-BMT vaccinations are usually commenced at 1-year post BMT. However, as Isael has been on therapy post-BMT with Ruxolitinib (ceased today), we would aim to commence these vaccinations at 6 months from now at his next review with us.    Pulmonary:  History of pneumomediastinum: finding upon work up Chest CT (10/22), asymptomatic. Per radiology, likely benign, perhaps transient. No issues.      Cardiovascular: Aaron-Parkinson-White Syndrome: diagnosed upon syncope in 02/2018. Asymptomatic, no interventions to date. Work up EKG c/w WPW, sinus bradycardia at 49 bpm. Work up ECHO normal with EF 68%.  No current concerns  11/6: Recent review with Cardiology in September 2019. EKG and echocardiogram repeated and no concerns noted. Recommended review with adult cardiology service in 2 years.      Derm: Continue review with dermatology and topical therapy as per their recommendations.    Fertility: Discussed that while BMT and chemotherapy have an adverse effect on fertility, they do not necessarily cause infertility. Contraception is recommended.    Disposition: Surveillance bone marrow assessment on 11/7. RTC in 6 months for his next assessment with bone marrow, labs, and exam with Dr. Zavala.       Sincerely,     Viky Zavala MD, PhD    Pediatric Blood and Marrow Transplant  [facility]    Written  by:  Cheng Ramos MD  Fellow, Pediatric Blood and Marrow Transplant    I, Viky Zavala MD PhD, saw this patient with the fellow and agree with the fellow s findings and plan of care as documented in the fellow s note.

## 2019-11-06 NOTE — PHARMACY-CONSULT NOTE
1 year anniversary vaccine consult.    Artemio is currently taking Jakofi (ruxolitinib).  Recommend waiting until 6 months after the completion of Jakofi to start our standard BMT vaccine schedule.    Discussed with Viky Zavala MD.     Francisca Culp, PharmD

## 2019-11-06 NOTE — NURSING NOTE
"Chief Complaint   Patient presents with     RECHECK     Patient is here today for ALL follow up     /78 (BP Location: Right arm, Patient Position: Fowlers, Cuff Size: Adult Large)   Pulse (!) 47   Temp 97.3  F (36.3  C) (Oral)   Resp 20   Ht 1.752 m (5' 8.98\")   Wt 69 kg (152 lb 1.9 oz)   SpO2 99%   BMI 22.48 kg/m      Donna Andrews LPN  November 6, 2019    "

## 2019-11-07 ENCOUNTER — HOSPITAL ENCOUNTER (OUTPATIENT)
Facility: CLINIC | Age: 19
Discharge: HOME | End: 2019-11-07
Attending: PHYSICIAN ASSISTANT | Admitting: PHYSICIAN ASSISTANT
Payer: COMMERCIAL

## 2019-11-07 ENCOUNTER — ANESTHESIA (OUTPATIENT)
Dept: PEDIATRICS | Facility: CLINIC | Age: 19
End: 2019-11-07
Payer: COMMERCIAL

## 2019-11-07 ENCOUNTER — ONCOLOGY VISIT (OUTPATIENT)
Dept: TRANSPLANT | Facility: CLINIC | Age: 19
End: 2019-11-07
Attending: NURSE PRACTITIONER
Payer: COMMERCIAL

## 2019-11-07 ENCOUNTER — APPOINTMENT (OUTPATIENT)
Dept: LAB | Facility: CLINIC | Age: 19
End: 2019-11-07
Attending: PEDIATRICS
Payer: COMMERCIAL

## 2019-11-07 ENCOUNTER — ANESTHESIA EVENT (OUTPATIENT)
Dept: PEDIATRICS | Facility: CLINIC | Age: 19
End: 2019-11-07
Payer: COMMERCIAL

## 2019-11-07 VITALS
HEART RATE: 50 BPM | TEMPERATURE: 96.2 F | OXYGEN SATURATION: 99 % | SYSTOLIC BLOOD PRESSURE: 99 MMHG | BODY MASS INDEX: 22.12 KG/M2 | RESPIRATION RATE: 14 BRPM | DIASTOLIC BLOOD PRESSURE: 68 MMHG | WEIGHT: 149.69 LBS

## 2019-11-07 DIAGNOSIS — C91.00 ALL (ACUTE LYMPHOBLASTIC LEUKEMIA OF INFANT) (H): Primary | ICD-10-CM

## 2019-11-07 DIAGNOSIS — C91.01 ACUTE LYMPHOBLASTIC LEUKEMIA (ALL) IN REMISSION (H): Primary | ICD-10-CM

## 2019-11-07 LAB
ALBUMIN SERPL-MCNC: 4 G/DL (ref 3.4–5)
ALP SERPL-CCNC: 48 U/L (ref 40–150)
ALT SERPL W P-5'-P-CCNC: 30 U/L (ref 0–70)
ANION GAP SERPL CALCULATED.3IONS-SCNC: 5 MMOL/L (ref 3–14)
AST SERPL W P-5'-P-CCNC: 22 U/L (ref 0–45)
BASOPHILS # BLD AUTO: 0 10E9/L (ref 0–0.2)
BASOPHILS NFR BLD AUTO: 0.2 %
BILIRUB SERPL-MCNC: 0.6 MG/DL (ref 0.2–1.3)
BUN SERPL-MCNC: 17 MG/DL (ref 7–30)
CALCIUM SERPL-MCNC: 8.9 MG/DL (ref 8.5–10.1)
CD19 CELLS # BLD: 332 CELLS/UL (ref 107–698)
CD19 CELLS NFR BLD: 50 % (ref 6–27)
CD3 CELLS # BLD: 275 CELLS/UL (ref 603–2990)
CD3 CELLS NFR BLD: 41 % (ref 49–84)
CD3+CD4+ CELLS # BLD: 187 CELLS/UL (ref 441–2156)
CD3+CD4+ CELLS NFR BLD: 28 % (ref 28–63)
CD3+CD4+ CELLS/CD3+CD8+ CLL BLD: 2.33 % (ref 1.4–2.6)
CD3+CD8+ CELLS # BLD: 80 CELLS/UL (ref 125–1312)
CD3+CD8+ CELLS NFR BLD: 12 % (ref 10–40)
CD3-CD16+CD56+ CELLS # BLD: 37 CELLS/UL (ref 95–640)
CD3-CD16+CD56+ CELLS NFR BLD: 6 % (ref 4–25)
CHLORIDE SERPL-SCNC: 108 MMOL/L (ref 94–109)
CHOLEST SERPL-MCNC: 167 MG/DL
CMV IGG SERPL QL IA: 0.4 AI (ref 0–0.8)
CO2 SERPL-SCNC: 27 MMOL/L (ref 20–32)
CREAT SERPL-MCNC: 0.94 MG/DL (ref 0.66–1.25)
DIFFERENTIAL METHOD BLD: ABNORMAL
EOSINOPHIL # BLD AUTO: 0.1 10E9/L (ref 0–0.7)
EOSINOPHIL NFR BLD AUTO: 1.7 %
ERYTHROCYTE [DISTWIDTH] IN BLOOD BY AUTOMATED COUNT: 12.8 % (ref 10–15)
FERRITIN SERPL-MCNC: 250 NG/ML (ref 26–388)
FSH SERPL-ACNC: 9.6 IU/L (ref 0.7–10.8)
GFR SERPL CREATININE-BSD FRML MDRD: >90 ML/MIN/{1.73_M2}
GLUCOSE SERPL-MCNC: 84 MG/DL (ref 70–99)
HBV CORE AB SERPL QL IA: NONREACTIVE
HBV SURFACE AG SERPL QL IA: NONREACTIVE
HCT VFR BLD AUTO: 39.7 % (ref 40–53)
HCV AB SERPL QL IA: NONREACTIVE
HDLC SERPL-MCNC: 53 MG/DL
HGB BLD-MCNC: 13.2 G/DL (ref 13.3–17.7)
HIV 1+2 AB+HIV1 P24 AG SERPL QL IA: NONREACTIVE
IFC SPECIMEN: ABNORMAL
IGG SERPL-MCNC: 854 MG/DL (ref 695–1620)
IMM GRANULOCYTES # BLD: 0 10E9/L (ref 0–0.4)
IMM GRANULOCYTES NFR BLD: 0.6 %
INSULIN SERPL-ACNC: 8.9 MU/L (ref 3–25)
LDLC SERPL CALC-MCNC: 87 MG/DL
LH SERPL-ACNC: 2.6 IU/L (ref 1.5–9.3)
LYMPHOCYTES # BLD AUTO: 0.6 10E9/L (ref 0.8–5.3)
LYMPHOCYTES NFR BLD AUTO: 13.3 %
MCH RBC QN AUTO: 32.4 PG (ref 26.5–33)
MCHC RBC AUTO-ENTMCNC: 33.2 G/DL (ref 31.5–36.5)
MCV RBC AUTO: 98 FL (ref 78–100)
MONOCYTES # BLD AUTO: 0.5 10E9/L (ref 0–1.3)
MONOCYTES NFR BLD AUTO: 9.6 %
NEUTROPHILS # BLD AUTO: 3.6 10E9/L (ref 1.6–8.3)
NEUTROPHILS NFR BLD AUTO: 74.6 %
NONHDLC SERPL-MCNC: 114 MG/DL
NRBC # BLD AUTO: 0 10*3/UL
NRBC BLD AUTO-RTO: 0 /100
PLATELET # BLD AUTO: 213 10E9/L (ref 150–450)
POTASSIUM SERPL-SCNC: 4 MMOL/L (ref 3.4–5.3)
PROT SERPL-MCNC: 7.1 G/DL (ref 6.8–8.8)
RBC # BLD AUTO: 4.07 10E12/L (ref 4.4–5.9)
SODIUM SERPL-SCNC: 140 MMOL/L (ref 133–144)
T PALLIDUM AB SER QL: NONREACTIVE
T4 FREE SERPL-MCNC: 0.88 NG/DL (ref 0.76–1.46)
TRIGL SERPL-MCNC: 136 MG/DL
TSH SERPL DL<=0.005 MIU/L-ACNC: 4.2 MU/L (ref 0.4–4)
WBC # BLD AUTO: 4.8 10E9/L (ref 4–11)

## 2019-11-07 PROCEDURE — 82784 ASSAY IGA/IGD/IGG/IGM EACH: CPT | Performed by: PEDIATRICS

## 2019-11-07 PROCEDURE — 25000128 H RX IP 250 OP 636: Performed by: NURSE ANESTHETIST, CERTIFIED REGISTERED

## 2019-11-07 PROCEDURE — 00000161 ZZHCL STATISTIC H-SPHEME PROCESS B/S: Performed by: PEDIATRICS

## 2019-11-07 PROCEDURE — 40000951 ZZHCL STATISTIC BONE MARROW INTERP TC 85097: Performed by: PEDIATRICS

## 2019-11-07 PROCEDURE — 88280 CHROMOSOME KARYOTYPE STUDY: CPT | Performed by: PEDIATRICS

## 2019-11-07 PROCEDURE — 88185 FLOWCYTOMETRY/TC ADD-ON: CPT | Performed by: PEDIATRICS

## 2019-11-07 PROCEDURE — 81268 CHIMERISM ANAL W/CELL SELECT: CPT | Performed by: PEDIATRICS

## 2019-11-07 PROCEDURE — 88311 DECALCIFY TISSUE: CPT | Performed by: PEDIATRICS

## 2019-11-07 PROCEDURE — 37000008 ZZH ANESTHESIA TECHNICAL FEE, 1ST 30 MIN: Performed by: NURSE PRACTITIONER

## 2019-11-07 PROCEDURE — 87798 DETECT AGENT NOS DNA AMP: CPT | Performed by: PEDIATRICS

## 2019-11-07 PROCEDURE — 87516 HEPATITIS B DNA AMP PROBE: CPT | Performed by: PEDIATRICS

## 2019-11-07 PROCEDURE — 86644 CMV ANTIBODY: CPT | Performed by: PEDIATRICS

## 2019-11-07 PROCEDURE — 86355 B CELLS TOTAL COUNT: CPT | Performed by: PEDIATRICS

## 2019-11-07 PROCEDURE — 85025 COMPLETE CBC W/AUTO DIFF WBC: CPT | Performed by: PEDIATRICS

## 2019-11-07 PROCEDURE — 87340 HEPATITIS B SURFACE AG IA: CPT | Performed by: PEDIATRICS

## 2019-11-07 PROCEDURE — 86803 HEPATITIS C AB TEST: CPT | Performed by: PEDIATRICS

## 2019-11-07 PROCEDURE — 88275 CYTOGENETICS 100-300: CPT | Performed by: PEDIATRICS

## 2019-11-07 PROCEDURE — 86359 T CELLS TOTAL COUNT: CPT | Performed by: PEDIATRICS

## 2019-11-07 PROCEDURE — 87389 HIV-1 AG W/HIV-1&-2 AB AG IA: CPT | Performed by: PEDIATRICS

## 2019-11-07 PROCEDURE — 86704 HEP B CORE ANTIBODY TOTAL: CPT | Performed by: PEDIATRICS

## 2019-11-07 PROCEDURE — 40001004 ZZHCL STATISTIC FLOW INT 9-15 ABY TC 88188: Performed by: PEDIATRICS

## 2019-11-07 PROCEDURE — 27210134 ZZH KIT BIOPSY BONE MARROW: Performed by: NURSE PRACTITIONER

## 2019-11-07 PROCEDURE — 84443 ASSAY THYROID STIM HORMONE: CPT | Performed by: PEDIATRICS

## 2019-11-07 PROCEDURE — 25000125 ZZHC RX 250: Performed by: NURSE ANESTHETIST, CERTIFIED REGISTERED

## 2019-11-07 PROCEDURE — 83525 ASSAY OF INSULIN: CPT | Performed by: PEDIATRICS

## 2019-11-07 PROCEDURE — 40000165 ZZH STATISTIC POST-PROCEDURE RECOVERY CARE: Performed by: NURSE PRACTITIONER

## 2019-11-07 PROCEDURE — 38222 DX BONE MARROW BX & ASPIR: CPT | Performed by: NURSE PRACTITIONER

## 2019-11-07 PROCEDURE — 80061 LIPID PANEL: CPT | Performed by: PEDIATRICS

## 2019-11-07 PROCEDURE — 83001 ASSAY OF GONADOTROPIN (FSH): CPT | Performed by: PEDIATRICS

## 2019-11-07 PROCEDURE — 86780 TREPONEMA PALLIDUM: CPT | Performed by: PEDIATRICS

## 2019-11-07 PROCEDURE — 88305 TISSUE EXAM BY PATHOLOGIST: CPT | Performed by: PEDIATRICS

## 2019-11-07 PROCEDURE — 83002 ASSAY OF GONADOTROPIN (LH): CPT | Performed by: PEDIATRICS

## 2019-11-07 PROCEDURE — 37000009 ZZH ANESTHESIA TECHNICAL FEE, EACH ADDTL 15 MIN: Performed by: NURSE PRACTITIONER

## 2019-11-07 PROCEDURE — 80053 COMPREHEN METABOLIC PANEL: CPT | Performed by: PEDIATRICS

## 2019-11-07 PROCEDURE — 87535 HIV-1 PROBE&REVERSE TRNSCRPJ: CPT | Performed by: PEDIATRICS

## 2019-11-07 PROCEDURE — 84403 ASSAY OF TOTAL TESTOSTERONE: CPT | Performed by: PEDIATRICS

## 2019-11-07 PROCEDURE — 84439 ASSAY OF FREE THYROXINE: CPT | Performed by: PEDIATRICS

## 2019-11-07 PROCEDURE — 88161 CYTOPATH SMEAR OTHER SOURCE: CPT | Performed by: PEDIATRICS

## 2019-11-07 PROCEDURE — 40000611 ZZHCL STATISTIC MORPHOLOGY W/INTERP HEMEPATH TC 85060: Performed by: PEDIATRICS

## 2019-11-07 PROCEDURE — 25000125 ZZHC RX 250: Performed by: NURSE PRACTITIONER

## 2019-11-07 PROCEDURE — 82728 ASSAY OF FERRITIN: CPT | Performed by: PEDIATRICS

## 2019-11-07 PROCEDURE — 88271 CYTOGENETICS DNA PROBE: CPT | Performed by: PEDIATRICS

## 2019-11-07 PROCEDURE — 25800030 ZZH RX IP 258 OP 636: Performed by: NURSE ANESTHETIST, CERTIFIED REGISTERED

## 2019-11-07 PROCEDURE — 86790 VIRUS ANTIBODY NOS: CPT | Performed by: PEDIATRICS

## 2019-11-07 PROCEDURE — 88264 CHROMOSOME ANALYSIS 20-25: CPT | Performed by: PEDIATRICS

## 2019-11-07 PROCEDURE — 38222 DX BONE MARROW BX & ASPIR: CPT | Performed by: PEDIATRICS

## 2019-11-07 PROCEDURE — 87521 HEPATITIS C PROBE&RVRS TRNSC: CPT | Performed by: PEDIATRICS

## 2019-11-07 PROCEDURE — 40001011 ZZH STATISTIC PRE-PROCEDURE NURSING ASSESSMENT: Performed by: NURSE PRACTITIONER

## 2019-11-07 PROCEDURE — 86360 T CELL ABSOLUTE COUNT/RATIO: CPT | Performed by: PEDIATRICS

## 2019-11-07 PROCEDURE — 88237 TISSUE CULTURE BONE MARROW: CPT | Performed by: PEDIATRICS

## 2019-11-07 PROCEDURE — 81267 CHIMERISM ANAL NO CELL SELEC: CPT | Performed by: PEDIATRICS

## 2019-11-07 PROCEDURE — 86357 NK CELLS TOTAL COUNT: CPT | Performed by: PEDIATRICS

## 2019-11-07 PROCEDURE — 36592 COLLECT BLOOD FROM PICC: CPT | Performed by: NURSE PRACTITIONER

## 2019-11-07 PROCEDURE — 88184 FLOWCYTOMETRY/ TC 1 MARKER: CPT | Performed by: PEDIATRICS

## 2019-11-07 PROCEDURE — 82306 VITAMIN D 25 HYDROXY: CPT | Performed by: PEDIATRICS

## 2019-11-07 RX ORDER — SODIUM CHLORIDE, SODIUM LACTATE, POTASSIUM CHLORIDE, CALCIUM CHLORIDE 600; 310; 30; 20 MG/100ML; MG/100ML; MG/100ML; MG/100ML
INJECTION, SOLUTION INTRAVENOUS CONTINUOUS PRN
Status: DISCONTINUED | OUTPATIENT
Start: 2019-11-07 | End: 2019-11-07

## 2019-11-07 RX ORDER — LIDOCAINE HYDROCHLORIDE 20 MG/ML
INJECTION, SOLUTION INFILTRATION; PERINEURAL PRN
Status: DISCONTINUED | OUTPATIENT
Start: 2019-11-07 | End: 2019-11-07

## 2019-11-07 RX ORDER — LIDOCAINE HYDROCHLORIDE 10 MG/ML
INJECTION, SOLUTION EPIDURAL; INFILTRATION; INTRACAUDAL; PERINEURAL
Status: DISCONTINUED
Start: 2019-11-07 | End: 2019-11-07 | Stop reason: HOSPADM

## 2019-11-07 RX ORDER — PROPOFOL 10 MG/ML
INJECTION, EMULSION INTRAVENOUS PRN
Status: DISCONTINUED | OUTPATIENT
Start: 2019-11-07 | End: 2019-11-07

## 2019-11-07 RX ORDER — FENTANYL CITRATE 50 UG/ML
INJECTION, SOLUTION INTRAMUSCULAR; INTRAVENOUS PRN
Status: DISCONTINUED | OUTPATIENT
Start: 2019-11-07 | End: 2019-11-07

## 2019-11-07 RX ORDER — PROPOFOL 10 MG/ML
INJECTION, EMULSION INTRAVENOUS CONTINUOUS PRN
Status: DISCONTINUED | OUTPATIENT
Start: 2019-11-07 | End: 2019-11-07

## 2019-11-07 RX ORDER — ONDANSETRON 2 MG/ML
INJECTION INTRAMUSCULAR; INTRAVENOUS PRN
Status: DISCONTINUED | OUTPATIENT
Start: 2019-11-07 | End: 2019-11-07

## 2019-11-07 RX ADMIN — FENTANYL CITRATE 50 MCG: 50 INJECTION, SOLUTION INTRAMUSCULAR; INTRAVENOUS at 10:19

## 2019-11-07 RX ADMIN — PROPOFOL 110 MG: 10 INJECTION, EMULSION INTRAVENOUS at 10:14

## 2019-11-07 RX ADMIN — ONDANSETRON 4 MG: 2 INJECTION INTRAMUSCULAR; INTRAVENOUS at 10:44

## 2019-11-07 RX ADMIN — PROPOFOL 250 MCG/KG/MIN: 10 INJECTION, EMULSION INTRAVENOUS at 10:14

## 2019-11-07 RX ADMIN — LIDOCAINE HYDROCHLORIDE 70 MG: 20 INJECTION, SOLUTION INFILTRATION; PERINEURAL at 10:14

## 2019-11-07 RX ADMIN — SODIUM CHLORIDE, POTASSIUM CHLORIDE, SODIUM LACTATE AND CALCIUM CHLORIDE: 600; 310; 30; 20 INJECTION, SOLUTION INTRAVENOUS at 10:14

## 2019-11-07 NOTE — ANESTHESIA POSTPROCEDURE EVALUATION
Anesthesia POST Procedure Evaluation    Patient: Artemio Nino   MRN:     [id number] Gender:   male   Age:    19 year old :      2000        Preoperative Diagnosis: ALL   Procedure(s):  Bone marrow biopsy   Postop Comments: No value filed.       Anesthesia Type:  Not documented  General    Reportable Event: NO     PAIN: Uncomplicated   Sign Out status: Comfortable, Well controlled pain     PONV: No PONV   Sign Out status:  No Nausea or Vomiting     Neuro/Psych: Uneventful perioperative course   Sign Out Status: Preoperative baseline; Age appropriate mentation     Airway/Resp.: Uneventful perioperative course   Sign Out Status: Non labored breathing, age appropriate RR; Resp. Status within EXPECTED Parameters     CV: Uneventful perioperative course   Sign Out status: Appropriate BP and perfusion indices; Appropriate HR/Rhythm     Disposition:   Sign Out in:  PACU  Disposition:  Phase II; Home  Recovery Course: Uneventful  Follow-Up: Not required     Comments/Narrative:  Patient doing well post-operatively.  No significant issues.  Hemodynamically stable, pain well controlled, nausea well controlled.  Stable for discharge from the PACU             Last Anesthesia Record Vitals:  CRNA VITALS  2019 1022 - 2019 1122      2019             NIBP:  (!) 81/43    Pulse:  51    NIBP Mean:  59    Ht Rate:  (!) 49    Temp:  36.2  C (97.2  F)    SpO2:  98 %    Resp Rate (observed):  12    EKG:  Sinus bradycardia          Last PACU Vitals:  Vitals Value Taken Time   BP 92/78 2019 11:18 AM   Temp 35.7  C (96.2  F) 2019 11:15 AM   Pulse 38 2019 11:18 AM   Resp 26 2019 11:18 AM   SpO2 98 % 2019 11:18 AM   Temp src     NIBP 81/43 2019 10:52 AM   Pulse 51 2019 10:52 AM   SpO2 98 % 2019 10:52 AM   Resp     Temp 36.2  C (97.2  F) 2019 10:52 AM   Ht Rate 49 2019 10:52 AM   Temp 2           Electronically Signed By: Alvaro Dawson MD, 2019, 1:18 PM

## 2019-11-07 NOTE — ANESTHESIA CARE TRANSFER NOTE
Patient: Artemio Nino    Procedure(s):  Bone marrow biopsy    Diagnosis: ALL  Diagnosis Additional Information: No value filed.    Anesthesia Type:   General     Note:  Airway :Nasal Cannula  Patient transferred to: Recovery  Comments: Transfer to patient room for recovery.  Monitors placed.  VSS noted.  Report to RN.  Handoff Report: Identifed the Patient, Identified the Reponsible Provider, Reviewed the pertinent medical history, Discussed the surgical course, Reviewed Intra-OP anesthesia mangement and issues during anesthesia, Set expectations for post-procedure period and Allowed opportunity for questions and acknowledgement of understanding      Vitals: (Last set prior to Anesthesia Care Transfer)    CRNA VITALS  11/7/2019 1022 - 11/7/2019 1100      11/7/2019             NIBP:  (!) 81/43    Pulse:  51    NIBP Mean:  59    Ht Rate:  (!) 49    Temp:  36.2  C (97.2  F)    SpO2:  98 %    Resp Rate (observed):  12    EKG:  Sinus bradycardia                Electronically Signed By: SNOW GOLD CRNA  November 7, 2019  11:00 AM

## 2019-11-07 NOTE — DISCHARGE INSTRUCTIONS
[facility]  [phone]    Care for Bone Marrow Biopsy    Do not remove bandage/dressing for 24 hours -- after this time they can be removed. If Steri-strips are presents they can stay on until they fall off    No bath, shower or soaking of the dressing for 24 hours    Activity as tolerated by the patient    Diet as able to tolerate    May use Tylenol as needed for pain control    Can apply icepack to the site for discomfort -- no more than 10 minutes at a time    If bleeding presents, apply pressure for 5 minutes    Call [phone] ask for Peds BMT/Hem/Onc fellow on call if complications arise including:     persistent bleeding    fever greater than 100.5    pain     Home Instructions for Your Child after Sedation  Today your child received (medicine):  Propofol, Fentanyl and Zofran  Please keep this form with your health records  Your child may be more sleepy and irritable today than normal. An adult should stay with your child for the rest of the day. The medicine may make the child dizzy. Avoid activities that require balance (bike riding, skating, climbing stairs, walking).  Remember:    When your child wants to eat again, start with liquids (juice, soda pop, Popsicles). If your child feels well enough, you may try a regular diet. It is best to offer light meals for the first 24 hours.    If your child has nausea (feels sick to the stomach) or vomiting (throws up), give small amounts of clear liquids (7-Up, Sprite, apple juice or broth). Fluids are more important than food until your child is feeling better.    Wait 24 hours before giving medicine that contains alcohol. This includes liquid cold, cough and allergy medicines (Robitussin, Vicks Formula 44 for children, Benadryl, Chlor-Trimeton).    If you will leave your child with a , give the sitter a copy of these instructions.  Call your doctor if:    You have questions about the test results.    Your child vomits (throws up) more than two  times.    Your child is very fussy or irritable.    You have trouble waking your child.     If your child has trouble breathing, call [phone].  If you have any questions or concerns, please call:  Pediatric Sedation Unit [phone]  Pediatric clinic  [phone]  [facility]  [phone] (ask for the Peds BMT doctor on call)  Emergency department [phone]  [facility] toll-free number [phone] (Monday--Friday, 8 a.m. to 4:30 p.m.)  I understand these instructions. I have all of my personal belongings.

## 2019-11-07 NOTE — ANESTHESIA PREPROCEDURE EVALUATION
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)      History of blood transfusion      WPW (Aaron-Parkinson-White syndrome) 2018      Past Surgical History:   Procedure Laterality Date     BONE MARROW BIOPSY, BONE SPECIMEN, NEEDLE/TROCAR Right 2018    Procedure: bone marrow biopsy;  Surgeon: Jacqueline Fitzgerald, NP;  Location: [facility]      BONE MARROW BIOPSY, BONE SPECIMEN, NEEDLE/TROCAR N/A 2019    Procedure: BIOPSY BONE MARROW;  Surgeon: Lisa Workman NP;  [facility]: [facility]      BONE MARROW BIOPSY, BONE SPECIMEN, NEEDLE/TROCAR N/A 2019    Procedure: BIOPSY, BONE MARROW;  Surgeon: Lilia López APRN CNP;  Location: [facility]     ESOPHAGOSCOPY, GASTROSCOPY, DUODENOSCOPY (EGD), COMBINED N/A 2019    Procedure: Upper endoscopy with biopsy;  Surgeon: Magdalene Hobson MD;  Location: [facility]      INSERT CATHETER VASCULAR ACCESS DOUBLE LUMEN CHILD N/A 10/26/2018    Procedure: double lumen tunneled line placement;  Surgeon: Rex Valente MD;  [facility]: [facility]      IR CVC TUNNEL REMOVAL LEFT  2019     IR PORT REMOVAL LEFT  2019     O CLAVICLE LEFT Left     fracture with surgical repair and plate/screws     ORTHOPEDIC SURGERY      femur     REMOVE CATHETER VASCULAR ACCESS N/A 2019    Procedure: Tunneled line removal;  Surgeon: Krystal Esparza MD;  [facility]      REMOVE PORT VASCULAR ACCESS N/A 2019    Procedure: REMOVAL, VASCULAR ACCESS PORT;  Surgeon: Link Rios PA-C;  [facility]          Anesthesia Evaluation     .      No history of anesthetic complications          ROS/MED HX    ENT/Pulmonary:  - neg pulmonary ROS     Neurologic:  - neg  neurologic ROS     Cardiovascular: Comment: WPW 2/18 - one episode of syncope.    EKG from 10/18: Sinus bradycardia w/short IA interval  ECHO 10/18: normal biventricular function  ziopath 10/18:  Cannot rule out pre-excitation at higher HR.        METS/Exercise Tolerance:  >4 METS   Hematologic:  - neg hematologic  ROS       Musculoskeletal:  - neg musculoskeletal ROS       GI/Hepatic:     (+) GERD       Renal/Genitourinary:  - ROS Renal section negative       Endo:  - neg endo ROS       Psychiatric:  - neg psychiatric ROS       Infectious Disease:  - neg infectious disease ROS       Malignancy:   (+) Malignancy History of Lymphoma/Leukemia  Lymph CA Remission status post,         Other:    - neg other ROS                     PHYSICAL EXAM:   Mental Status/Neuro: A/A/O   Airway: Facies: Feasible  Mallampati: I  Mouth/Opening: Full  TM distance: > 6 cm  Neck ROM: Full   Respiratory: Auscultation: CTAB     Resp. Rate: Normal     Resp. Effort: Normal      CV: Rhythm: Regular  Rate: Age appropriate  Heart: Normal Sounds  Edema: None   Comments:      Dental: Normal Dentition                LABS:  CBC:   Lab Results   Component Value Date    WBC 3.0 (L) 05/16/2019    WBC 3.0 (L) 05/16/2019    HGB 10.2 (L) 05/16/2019    HGB 11.2 (L) 05/16/2019    HCT 30.2 (L) 05/16/2019    HCT 33.1 (L) 05/16/2019     (L) 05/16/2019     (L) 05/16/2019     BMP:   Lab Results   Component Value Date     05/16/2019     02/15/2019    POTASSIUM 4.2 05/16/2019    POTASSIUM 4.1 02/15/2019    CHLORIDE 112 (H) 05/16/2019    CHLORIDE 108 02/15/2019    CO2 25 05/16/2019    CO2 24 02/15/2019    BUN 12 05/16/2019    BUN 15 02/15/2019    CR 0.92 05/16/2019    CR 1.02 02/15/2019    GLC 88 05/16/2019     (H) 02/15/2019     COAGS:   Lab Results   Component Value Date    PTT 34 02/08/2019    INR 1.12 05/16/2019    FIBR 384 11/26/2018     POC: No results found for: BGM, HCG, HCGS  OTHER:   Lab Results   Component Value Date     "ELSA 8.2 (L) 05/16/2019    PHOS 3.6 02/15/2019    MAG 2.0 02/15/2019    ALBUMIN 3.4 05/16/2019    PROTTOTAL 5.9 (L) 05/16/2019    ALT 37 05/16/2019    AST 30 05/16/2019    ALKPHOS 48 05/16/2019    BILITOTAL 0.7 05/16/2019        Preop Vitals    BP Readings from Last 3 Encounters:   11/07/19 97/76   11/06/19 110/78   09/04/19 112/48    Pulse Readings from Last 3 Encounters:   11/07/19 (!) 45   11/06/19 (!) 47   09/04/19 (!) 48      Resp Readings from Last 3 Encounters:   11/07/19 16   11/06/19 20   09/04/19 14    SpO2 Readings from Last 3 Encounters:   11/07/19 99%   11/06/19 99%   09/04/19 98%      Temp Readings from Last 1 Encounters:   11/07/19 36.5  C (97.7  F) (Oral)    Ht Readings from Last 1 Encounters:   11/06/19 1.752 m (5' 8.98\")      Wt Readings from Last 1 Encounters:   11/07/19 67.9 kg (149 lb 11.1 oz)    Estimated body mass index is 22.12 kg/m  as calculated from the following:    Height as of 11/6/19: 1.752 m (5' 8.98\").    Weight as of this encounter: 67.9 kg (149 lb 11.1 oz).     LDA:  Airway - Adult/Peds (Active)   Number of days: 377        Assessment:   ASA SCORE: 2    H&P: History and physical reviewed and following examination; no interval change.    NPO Status: NPO Appropriate     Plan:   Anes. Type:  General   Pre-Medication: None   Induction:  IV (Standard)   Airway: Native Airway   Access/Monitoring: PIV   Maintenance: Propofol Sedation     Postop Plan:   Postop Pain: None  Postop Sedation/Airway: Not planned     PONV Management:    Prevention: Ondansetron, Propofol     CONSENT: Direct conversation   Plan and risks discussed with: Patient   Blood Products: Consented (ALL Blood Products)                   Sabrina Coughlin  "

## 2019-11-07 NOTE — PROCEDURES
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  Informed consent was obtained. Following the administration of propofol as sedation, patient was placed in the  left lateral decubitus position and prepped and draped in a sterile manner.  Approximately 2 cc of 1% Lidocaine was used over the right posterior iliac spine.  Following this a 3 mm incision was made. Trephine bone marrow core and aspirates were obtained from the [facility]. Aspirates were sent for morphology, immunophenotyping, cytogenetics and molecular diagnostics for engraftment studies.  A total of approximately 15 ml of marrow was aspirated.  Following this procedure a sterile dressing was applied to the bone marrow biopsy site. The patient was placed in the supine position to maintain pressure on the biopsy site. Post-procedure wound care instructions were given. The patient tolerated the procedure well with no known discomfort.  Complications: None    Procedure performed by: JOHN Rose CPNP-LORENZA  [facility]  [address] [address]  [phone]  [phone]

## 2019-11-08 LAB
COPATH REPORT: NORMAL
HTLV I+II AB PATRN SER IB-IMP: NEGATIVE
MPX SERIES: NONREACTIVE
WNV RNA SPEC QL NAA+PROBE: NONREACTIVE

## 2019-11-10 LAB — TESTOST SERPL-MCNC: 668 NG/DL (ref 240–950)

## 2019-11-11 LAB
COPATH REPORT: NORMAL
COPATH REPORT: NORMAL
DEPRECATED CALCIDIOL+CALCIFEROL SERPL-MC: <32 UG/L (ref 20–75)
VITAMIN D2 SERPL-MCNC: <5 UG/L
VITAMIN D3 SERPL-MCNC: 27 UG/L

## 2019-11-15 LAB
DLCOUNC-%PRED-PRE: 87 %
DLCOUNC-PRE: 27.49 ML/MIN/MMHG
DLCOUNC-PRED: 31.26 ML/MIN/MMHG
ERV-%PRED-PRE: 100 %
ERV-PRE: 1.96 L
ERV-PRED: 1.96 L
EXPTIME-PRE: 4.68 SEC
FEF2575-%PRED-PRE: 119 %
FEF2575-PRE: 5.81 L/SEC
FEF2575-PRED: 4.87 L/SEC
FEFMAX-%PRED-PRE: 100 %
FEFMAX-PRE: 9.81 L/SEC
FEFMAX-PRED: 9.78 L/SEC
FEV1-%PRED-PRE: 114 %
FEV1-PRE: 5.12 L
FEV1FEV6-PRE: 88 %
FEV1FEV6-PRED: 84 %
FEV1FVC-PRE: 88 %
FEV1FVC-PRED: 86 %
FEV1SVC-PRE: 86 %
FEV1SVC-PRED: 79 %
FIFMAX-PRE: 4.5 L/SEC
FRCPLETH-%PRED-PRE: 109 %
FRCPLETH-PRE: 3.52 L
FRCPLETH-PRED: 3.21 L
FVC-%PRED-PRE: 111 %
FVC-PRE: 5.83 L
FVC-PRED: 5.25 L
IC-%PRED-PRE: 108 %
IC-PRE: 4 L
IC-PRED: 3.67 L
RVPLETH-%PRED-PRE: 97 %
RVPLETH-PRE: 1.56 L
RVPLETH-PRED: 1.6 L
TLCPLETH-%PRED-PRE: 110 %
TLCPLETH-PRE: 7.52 L
TLCPLETH-PRED: 6.83 L
VA-%PRED-PRE: 115 %
VA-PRE: 6.93 L
VC-%PRED-PRE: 105 %
VC-PRE: 5.96 L
VC-PRED: 5.63 L

## 2019-11-18 NOTE — PLAN OF CARE
Problem: Stem Cell/Bone Marrow Transplant (Adult)  Goal: Signs and Symptoms of Listed Potential Problems Will be Absent, Minimized or Managed (Stem Cell/Bone Marrow Transplant)  Signs and symptoms of listed potential problems will be absent, minimized or managed by discharge/transition of care (reference Stem Cell/Bone Marrow Transplant (Adult) CPG).   Outcome: No Change  Afebrile. VSS. Lung sounds clear. Pt said pain controlled with morphine PCA. Pt took 6 bumps and 4 denied. Ativan given x 1 with good results. Morphine and tacro gtt remain unchanged. Urine occurrence x 1, stool culture sent. No family present at bedside. Hourly rounding complete. Will continue to monitor and assess.        Head contusion

## 2019-11-22 ENCOUNTER — TELEPHONE (OUTPATIENT)
Dept: DERMATOLOGY | Facility: CLINIC | Age: 19
End: 2019-11-22

## 2019-11-22 ENCOUNTER — OFFICE VISIT (OUTPATIENT)
Dept: DERMATOLOGY | Facility: CLINIC | Age: 19
End: 2019-11-22
Attending: DERMATOLOGY
Payer: COMMERCIAL

## 2019-11-22 VITALS
TEMPERATURE: 98.6 F | HEIGHT: 69 IN | SYSTOLIC BLOOD PRESSURE: 114 MMHG | DIASTOLIC BLOOD PRESSURE: 76 MMHG | WEIGHT: 146.16 LBS | BODY MASS INDEX: 21.65 KG/M2 | HEART RATE: 73 BPM

## 2019-11-22 DIAGNOSIS — L00 STAPHYLOCOCCAL SCALDED SKIN SYNDROME: Primary | ICD-10-CM

## 2019-11-22 DIAGNOSIS — L00: ICD-10-CM

## 2019-11-22 PROCEDURE — 87070 CULTURE OTHR SPECIMN AEROBIC: CPT | Performed by: DERMATOLOGY

## 2019-11-22 PROCEDURE — 87186 SC STD MICRODIL/AGAR DIL: CPT | Performed by: DERMATOLOGY

## 2019-11-22 PROCEDURE — 87077 CULTURE AEROBIC IDENTIFY: CPT | Performed by: DERMATOLOGY

## 2019-11-22 PROCEDURE — G0463 HOSPITAL OUTPT CLINIC VISIT: HCPCS | Mod: ZF

## 2019-11-22 RX ORDER — CEPHALEXIN 500 MG/1
500 CAPSULE ORAL 3 TIMES DAILY
Qty: 21 CAPSULE | Refills: 0 | Status: SHIPPED | OUTPATIENT
Start: 2019-11-22 | End: 2019-11-26

## 2019-11-22 RX ORDER — MUPIROCIN 20 MG/G
OINTMENT TOPICAL
Qty: 120 G | Refills: 1 | Status: SHIPPED | OUTPATIENT
Start: 2019-11-22 | End: 2020-07-16

## 2019-11-22 ASSESSMENT — PAIN SCALES - GENERAL: PAINLEVEL: EXTREME PAIN (8)

## 2019-11-22 ASSESSMENT — MIFFLIN-ST. JEOR: SCORE: 1658.63

## 2019-11-22 NOTE — TELEPHONE ENCOUNTER
My name is Dr. Mickey Beck, and I am the pediatric dermatology resident on call. This note serves as documentation of a page.    The patient's mother paged the resident on call. She reports that the patient has had an acute onset, red and scaly rash of the face since he woke up this morning. They deny any irritant or allergic exposures. The patient has a history of acne and Demodex folliculitis, but the mother says that this looks different. 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    After discussing the case with Dr. Delacruz, we agreed together to have the patient seen as an add-on this afternoon around 2 p.m. The family agreed to this time. We have forwarded a message to our  to help facilitate scheduling this appointment today.

## 2019-11-22 NOTE — LETTER
11/22/2019      RE: Artemio Nino  [address]  [place] [address]       Pediatric Dermatology Return Patient Visit     Dermatology problem list  1. Dermatitis--s/p transplant February 2019, neck, trunk, and distal arms  -Bx'ed 12/6/18, unsure if GVHD or a different eczematous process  -Voriconazole D/c'd in December 2. 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    He has had a new rash for the past four days and it has been getting worse every day. Very dry and red with peeling; sometimes itchy, sometimes more painful and irritated. Skin feels tight, like he can't stretch his face or talk comfortably. Also has some numbness around his chin. Feels very different than previous rashes. He thinks it looks and feels like a rash he had on his face while he was hospitalized, pre-transplant, but it was not this severe. Regarding his history of Demodex dermatitis, this doesn't feel like that - what he has now is more diffusely dry, previously what he had (Demodex) was just small bumps. He has been using the CLn wash every other day. Uses Vanicream moisturizer every day but recently it has caused intense burning sensation. He has not been using tretinoin since his last visit, thinks he never picked this up. He has not been using the topical ivermectin recently.   Today the burning sensation was severe enough that warm water from a shower caused burning. The dryness and redness is starting to spread to his neck and shoulders, but is worse on his face. No discrete lesions or rashes elsewhere. Otherwise he has been feeling well - no recent fevers, chills. He cannot recall any open sores or lesions on his face recently. No cold sores.    Currently takes no oral meds; discontinued his  "chemotherapy after recent visit w/ heme/onc this month.    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 Went to [place] in September; no other recent travel. Lives in [place].     Medications:       Current Outpatient Medications   Medication     sulfamethoxazole-trimethoprim (BACTRIM DS/SEPTRA DS) 800-160 MG per tablet     tretinoin (RETIN-A) 0.025 % external cream     metroNIDAZOLE (METROCREAM) 0.75 % external cream     pantoprazole (PROTONIX) 40 MG EC tablet     ruxolitinib 10 MG TABS tablet CHEMO     tacrolimus (PROTOPIC) 0.1 % external ointment      No current facility-administered medications for this visit.       Allergies: NKDA     ROS: a 10 point review of systems including constitutional, HEENT, CV, GI, musculoskeletal, Neurologic, Endocrine, Respiratory, Hematologic and Allergic/Immunologic was performed and was negative.     Physical Examination: Ht 5' 8.9\" (175 cm)   Wt 66.8 kg (147 lb 4.3 oz)   BMI 21.81 kg/m       General: Well-developed, well-nourished in no apparent distress  Eyes: lids, conjunctivae normal  Neck: supple  Respiratory: breathing comfortably  Skin: Focused skin examination of the scalp, face, neck, chest, abdomen, back, right and left arms, hands, fingers, ears, eyelids, and lips. 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 Fortunately he is not currently exhibiting signs/symptoms of systemic infection - his vital signs in clinic are normal and he is otherwise feeling well. Will plan to treat with Keflex and mupirocin as well as liberal use of Vaseline/Aquaphor with close follow up next week to assess for improvement.     Recommendations:  - Start cephalexin 500 mg TID  - Recommend bleach bach daily - 1/2 cup of bleach per bath tub of water  - Use only ointments on your face for now - mupirocin, Vaseline, Aquaphor  - Follow up in 1 week     Patient seen and staffed with Dr. Delacruz.    Katie Polo MD  Med-Peds PGY-2    I have personally examined this patient and agree with the resident's documentation and plan of care.  I have reviewed and amended the resident's note above.  The documentation accurately reflects my clinical observations, diagnoses, treatment and follow-up plans.     Abdirizak Delacruz MD  , Pediatric Dermatology

## 2019-11-22 NOTE — PROGRESS NOTES
Pediatric Dermatology Return Patient Visit     Dermatology problem list  1. Dermatitis--s/p transplant February 2019, neck, trunk, and distal arms  -Bx'ed 12/6/18, unsure if GVHD or a different eczematous process  -Voriconazole D/c'd in December  2. 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    He has had a new rash for the past four days and it has been getting worse every day. Very dry and red with peeling; sometimes itchy, sometimes more painful and irritated. Skin feels tight, like he can't stretch his face or talk comfortably. Also has some numbness around his chin. Feels very different than previous rashes. He thinks it looks and feels like a rash he had on his face while he was hospitalized, pre-transplant, but it was not this severe. Regarding his history of Demodex dermatitis, this doesn't feel like that - what he has now is more diffusely dry, previously what he had (Demodex) was just small bumps. He has been using the CLn wash every other day. Uses Vanicream moisturizer every day but recently it has caused intense burning sensation. He has not been using tretinoin since his last visit, thinks he never picked this up. He has not been using the topical ivermectin recently.   Today the burning sensation was severe enough that warm water from a shower caused burning. The dryness and redness is starting to spread to his neck and shoulders, but is worse on his face. No discrete lesions or rashes elsewhere. Otherwise he has been feeling well - no recent fevers, chills. He cannot recall any open sores or lesions on his face recently. No cold sores.    Currently takes no oral meds; discontinued his chemotherapy after recent visit w/ heme/onc this month.    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 Went to [place] in September; no other recent travel. Lives in [place].     Medications:       Current Outpatient Medications   Medication     sulfamethoxazole-trimethoprim (BACTRIM DS/SEPTRA DS) 800-160 MG per tablet     tretinoin (RETIN-A) 0.025 % external cream     metroNIDAZOLE (METROCREAM) 0.75 % external cream     pantoprazole (PROTONIX) 40 MG EC tablet     ruxolitinib 10 MG TABS tablet CHEMO     tacrolimus (PROTOPIC) 0.1 % external ointment      No current facility-administered medications for this visit.       Allergies: NKDA     ROS: a 10 point review of systems including constitutional, HEENT, CV, GI, musculoskeletal, Neurologic, Endocrine, Respiratory, Hematologic and Allergic/Immunologic was performed and was negative.     Physical Examination: Ht 5' 8.9\" (175 cm)   Wt 66.8 kg (147 lb 4.3 oz)   BMI 21.81 kg/m       General: Well-developed, well-nourished in no apparent distress  Eyes: lids, conjunctivae normal  Neck: supple  Respiratory: breathing comfortably  Skin: Focused skin examination of the scalp, face, neck, chest, abdomen, back, right and left arms, hands, fingers, ears, eyelids, and lips. 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 Fortunately he is not currently " exhibiting signs/symptoms of systemic infection - his vital signs in clinic are normal and he is otherwise feeling well. Will plan to treat with Keflex and mupirocin as well as liberal use of Vaseline/Aquaphor with close follow up next week to assess for improvement.     Recommendations:  - Start cephalexin 500 mg TID  - Recommend bleach bach daily - 1/2 cup of bleach per bath tub of water  - Use only ointments on your face for now - mupirocin, Vaseline, Aquaphor  - Follow up in 1 week     Patient seen and staffed with Dr. Delacruz.    Katie Polo MD  Med-Peds PGY-2    I have personally examined this patient and agree with the resident's documentation and plan of care.  I have reviewed and amended the resident's note above.  The documentation accurately reflects my clinical observations, diagnoses, treatment and follow-up plans.     Abdirizak Delacruz MD  , Pediatric Dermatology

## 2019-11-22 NOTE — NURSING NOTE
"[facility] [[id number]]  Chief Complaint   Patient presents with     RECHECK     painful rash     Initial Ht 5' 9.02\" (175.3 cm)   Wt 146 lb 2.6 oz (66.3 kg)   BMI 21.57 kg/m   Estimated body mass index is 21.57 kg/m  as calculated from the following:    Height as of this encounter: 5' 9.02\" (175.3 cm).    Weight as of this encounter: 146 lb 2.6 oz (66.3 kg).  Medication Reconciliation: complete  "

## 2019-11-22 NOTE — PATIENT INSTRUCTIONS
Use only ointments on your face for now - mupirocin, Vaseline, Aquaphor    Recommend bleach bach daily - 1/2 cup of bleach per bath tub of water    Start antibiotic called Keflex today - take 500 mg (one capsule) three times per day      [facility]- Pediatric Dermatology  Dr. Laura Schaffer, Dr. Abdirizak Delacruz, Dr. Jazmín Wang, Garima Ocampo, HANNAH Gill, Dr. Wanda Bowden & Dr. Alvaro Leon       Non Urgent  Nurse Triage Line; [phone]- Nica and Harriet RN Care Coordinators      [facility] Pediatric Dermatology Specialty - [phone]      If you need a prescription refill, please contact your pharmacy. Refills are approved or denied by our Physicians during normal business hours, Monday through Fridays    Per office policy, refills will not be granted if you have not been seen within the past year (or sooner depending on your child's condition)      Scheduling Information:     Pediatric Appointment Scheduling and Call Center [phone]   Radiology Scheduling- [phone]     Sedation Unit Scheduling- [phone]    [place] Scheduling- General [phone]; Pediatric Dermatology [phone]    Main  Services: [phone]   Lao: [phone]   North Korean: [phone]   Hmong/Yakut/Turkmen: [phone]      Preadmission Nursing Department Fax Number: [phone] (Fax all pre-operative paperwork to this number)      For urgent matters arising during evenings, weekends, or holidays that cannot wait for normal business hours please call [phone] and ask for the Dermatology Resident On-Call to be paged.

## 2019-11-25 ENCOUNTER — TELEPHONE (OUTPATIENT)
Dept: DERMATOLOGY | Facility: CLINIC | Age: 19
End: 2019-11-25

## 2019-11-25 LAB
BACTERIA SPEC CULT: ABNORMAL
BACTERIA SPEC CULT: ABNORMAL
Lab: ABNORMAL
SPECIMEN SOURCE: ABNORMAL

## 2019-11-25 NOTE — TELEPHONE ENCOUNTER
Mom called wondering what the skin culture result is. Will forward to Garima and the nurse pool.    Flavia Hamm, CMA

## 2019-11-26 ENCOUNTER — OFFICE VISIT (OUTPATIENT)
Dept: DERMATOLOGY | Facility: CLINIC | Age: 19
End: 2019-11-26
Attending: PHYSICIAN ASSISTANT
Payer: COMMERCIAL

## 2019-11-26 VITALS — HEIGHT: 69 IN | WEIGHT: 146.16 LBS | BODY MASS INDEX: 21.65 KG/M2

## 2019-11-26 DIAGNOSIS — L00 STAPHYLOCOCCAL SCALDED SKIN SYNDROME: ICD-10-CM

## 2019-11-26 LAB — COPATH REPORT: NORMAL

## 2019-11-26 PROCEDURE — G0463 HOSPITAL OUTPT CLINIC VISIT: HCPCS | Mod: ZF

## 2019-11-26 RX ORDER — CEPHALEXIN 500 MG/1
500 CAPSULE ORAL 3 TIMES DAILY
Qty: 21 CAPSULE | Refills: 0 | Status: SHIPPED | OUTPATIENT
Start: 2019-11-26 | End: 2020-07-14

## 2019-11-26 ASSESSMENT — MIFFLIN-ST. JEOR: SCORE: 1658.63

## 2019-11-26 ASSESSMENT — PAIN SCALES - GENERAL: PAINLEVEL: NO PAIN (0)

## 2019-11-26 NOTE — PATIENT INSTRUCTIONS
[facility]- Pediatric Dermatology  Dr. Laura Schaffer, Dr. Abdirizak Delacruz, Dr. Jazmín Gill, Dr. Wanda Bowden & Dr. Alvaro Leon       Non Urgent  Nurse Triage Line; [phone]- Nica and Harriet RN Care Coordinators        If you need a prescription refill, please contact your pharmacy. Refills are approved or denied by our Physicians during normal business hours, Monday through Fridays    Per office policy, refills will not be granted if you have not been seen within the past year (or sooner depending on your child's condition)      Scheduling Information:     Pediatric Appointment Scheduling and Call Center [phone]   Radiology Scheduling- [phone]     Sedation Unit Scheduling- [phone]    [place] Scheduling- [facility] [phone]; Pediatric Dermatology [phone]    Main  Services: [phone]   Equatorial [place]inean: [phone]   Montenegrin: [phone]   Hmong/Romanian/Armenian: [phone]      Preadmission Nursing Department Fax Number: [phone] (Fax all pre-operative paperwork to this number)      For urgent matters arising during evenings, weekends, or holidays that cannot wait for normal business hours please call [phone] and ask for the Dermatology Resident On-Call to be paged.

## 2019-11-26 NOTE — LETTER
11/26/2019    RE: Artemio Nino  [address] [id number]     [facility] Dermatology Note      Dermatology Problem List:  1. Dermatitis--s/p transplant February 2019, neck, trunk, and distal arms  -Bx'ed 12/6/18, unsure if GVHD or a different eczematous process  -Voriconazole D/c'd in December  2. Demodex dermatitis - s/p Ivermectin, soolantra/protopic  3. Staph scalded skin syndrome  - Daily bleach baths, cephalexin 500 mg TID for 7 days, mupirocin and Vaseline    Encounter Date: Nov 26, 2019    CC:  Chief Complaint   Patient presents with     RECHECK     Patient being seen for follow up.           History of Present Illness:  Mr. Artemio Nino is a 19 year old male with a history of BMT November 2018 who presents as a follow-up for Staph scalded skin syndrome. The patient was last seen four days ago on 11/22/19 when he started cephalexin 500 mg TID, started daily bleach baths, used mupirocin, Vaseline, and Aquaphor on the face for one week. At that time an aerobic skin culture from the face was taken which grew a light growth of Staph aureus.    Today he is with his father. He reports that he has been taking the antibiotics as advised and has not noticed any adverse reactions. Denies fevers or body aches since the last visit. He is using 1/2 cup plain bleach in the bath and takes a bleach bath every night. He has been applying mupirocin mixed with Vaseline after the bath every day to the affected area on his skin. Overall he notes that his skin is significantly improved.     Otherwise he is feeling well, without additional skin concerns at this time.       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)      History of blood transfusion      WPW (Aaron-Parkinson-White syndrome) 03/2018     Past Surgical History:   Procedure Laterality Date     BONE MARROW BIOPSY, BONE SPECIMEN, NEEDLE/TROCAR Right 11/27/2018    Procedure: bone marrow biopsy;  Surgeon: Jacqueline Fitzgerald, NP;  [facility]      BONE MARROW BIOPSY, BONE SPECIMEN, NEEDLE/TROCAR N/A 2/8/2019    Procedure: BIOPSY BONE MARROW;  Surgeon: Lisa Workman NP;  [facility]: [facility]      BONE MARROW BIOPSY, BONE SPECIMEN, NEEDLE/TROCAR N/A 5/16/2019    Procedure: BIOPSY, BONE MARROW;  Surgeon: Lilia López APRN CNP;  [facility]     BONE MARROW BIOPSY, BONE SPECIMEN, NEEDLE/TROCAR N/A 11/7/2019    Procedure: Bone marrow biopsy;  Surgeon: Jacqueline Shin, NP;  [facility]: [facility]      ESOPHAGOSCOPY, GASTROSCOPY, DUODENOSCOPY (EGD), COMBINED N/A 2/22/2019    Procedure: Upper endoscopy with biopsy;  Surgeon: Magdalene Hobson MD;  [facility]: [facility]      INSERT CATHETER VASCULAR ACCESS DOUBLE LUMEN CHILD N/A 10/26/2018    Procedure: double lumen tunneled line placement;  Surgeon: Rex Valente MD;  [facility]: [facility]      IR CVC TUNNEL REMOVAL LEFT  2/8/2019     IR PORT REMOVAL LEFT  5/16/2019     O CLAVICLE LEFT Left     fracture with surgical repair and plate/screws     ORTHOPEDIC SURGERY      femur     REMOVE CATHETER VASCULAR ACCESS N/A 2/8/2019    Procedure: Tunneled line removal;  Surgeon: Krystal Esparza MD;  [facility]: [facility]      REMOVE PORT VASCULAR ACCESS N/A 5/16/2019    Procedure: REMOVAL, VASCULAR ACCESS PORT;  Surgeon: Link Rios PA-C;  [facility]: [facility]     None, Healthy  Patient has a medical history of  BMT November 2018    Social History:  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 Went to [place] in September; no other recent travel. Lives  "in [place].    Family History:  No family history on file.      Medications:  Current Outpatient Medications   Medication Sig Dispense Refill     cephALEXin (KEFLEX) 500 MG capsule Take 1 capsule (500 mg) by mouth 3 times daily for 7 days 21 capsule 0     metroNIDAZOLE (METROCREAM) 0.75 % external cream Apply topically 2 times daily 45 g 1     mupirocin (BACTROBAN) 2 % external ointment Use 2 times a day to affected area. 120 g 1     pantoprazole (PROTONIX) 40 MG EC tablet Take 1 tablet (40 mg) by mouth 2 times daily 30 tablet 3     sulfamethoxazole-trimethoprim (BACTRIM DS/SEPTRA DS) 800-160 MG per tablet Take 1 tablet by mouth Every Mon, Tues two times daily (Patient not taking: Reported on 11/7/2019) 20 tablet 3     tacrolimus (PROTOPIC) 0.1 % external ointment Apply topically 2 times daily (Patient not taking: Reported on 9/27/2019) 60 g 1     tretinoin (RETIN-A) 0.025 % external cream Use every night (Patient not taking: Reported on 11/7/2019) 45 g 3       Allergies   Allergen Reactions     Blood Transfusion Related (Informational Only) Other (See Comments)     Patient has a history of a clinically significant antibody against RBC antigens.  A delay in compatible RBCs may occur.Stem cell transplant patient.  Give type O RBCs.     No Known Drug Allergies        Review of Systems:  A 12 point ROS was performed today and was negative.    Physical exam:  Vitals: Ht 5' 9.02\" (175.3 cm)   Wt 66.3 kg (146 lb 2.6 oz)   BMI 21.57 kg/m     GEN: This is a well developed, well-nourished male in no acute distress, in a pleasant mood.    Eyes: conjunctivae clear  Neck: supple  Resp: breathing comfortably in no distress  CV: well-perfused, no cyanosis  Abd: no distension  Ext: no deformity, clubbing or edema  SKIN: Waist-up skin, which includes the head/face, neck, both arms, chest, back, abdomen, digits and/or nails was examined.  There are faint pink scaly plaques to posterior lower neck and scattered to the face. No " desquamation of lips or palms.   -No other lesions of concern on areas examined.       Impression/Plan:  1. Staph scalded skin syndrome, improvement noted prior to completion of 7 days course of antibiotics.     Complete course of cephalexin 500 mg TID for a total of 14 days. Patient and family requested refills as he will be out of town and does not want it to flare.    Continue daily bleach baths using 1/2 cup of plain bleach in the bath, reducing to once a week once flaring resolves    Continue liberal us of Vaseline    Continue to apply mupirocin mixed with Vaseline to the affected area after bathing daily, sample emollients provided today.    Samples of CLN wash and moisturizer given if patient does not have access to a bath tub when traveling.     Photodocumentation was obtained today    Follow-up in 3 months, earlier for new or changing lesions.       Staff Involved:  Scribe/Staff    Scribe Disclosure:   SHELIA, Dean Quintanilla, am serving as a scribe to document services personally performed by Garima Ocampo PA-C, based on data collection and the provider's statements to me.    Provider Disclosure:   The documentation recorded by the scribe accurately reflects the services I personally performed and the decisions made by me.    All risks, benefits and alternatives were discussed with patient.  Patient is in agreement and understands the assessment and plan.  All questions were answered.  Sun Screen Education was given.   Return to Clinic in 3 months or sooner as needed.   Garima Ocampo PA-C   [facility]             Garima Ocampo PA-C

## 2019-11-26 NOTE — PROGRESS NOTES
[facility] Dermatology Note      Dermatology Problem List:  1. Dermatitis--s/p transplant February 2019, neck, trunk, and distal arms  -Bx'ed 12/6/18, unsure if GVHD or a different eczematous process  -Voriconazole D/c'd in December  2. Demodex dermatitis - s/p Ivermectin, soolantra/protopic  3. Staph scalded skin syndrome  - Daily bleach baths, cephalexin 500 mg TID for 7 days, mupirocin and Vaseline    Encounter Date: Nov 26, 2019    CC:  Chief Complaint   Patient presents with     RECHECK     Patient being seen for follow up.           History of Present Illness:  Mr. Artemio Nino is a 19 year old male with a history of BMT November 2018 who presents as a follow-up for Staph scalded skin syndrome. The patient was last seen four days ago on 11/22/19 when he started cephalexin 500 mg TID, started daily bleach baths, used mupirocin, Vaseline, and Aquaphor on the face for one week. At that time an aerobic skin culture from the face was taken which grew a light growth of Staph aureus.    Today he is with his father. He reports that he has been taking the antibiotics as advised and has not noticed any adverse reactions. Denies fevers or body aches since the last visit. He is using 1/2 cup plain bleach in the bath and takes a bleach bath every night. He has been applying mupirocin mixed with Vaseline after the bath every day to the affected area on his skin. Overall he notes that his skin is significantly improved.     Otherwise he is feeling well, without additional skin concerns at this time.       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)     At risk for infection     S/P allogeneic bone marrow transplant (H)     Past Medical History:   Diagnosis Date     ALL (acute lymphoblastic  leukemia of infant) (H)      History of blood transfusion      WPW (Aaron-Parkinson-White syndrome) 03/2018     Past Surgical History:   Procedure Laterality Date     BONE MARROW BIOPSY, BONE SPECIMEN, NEEDLE/TROCAR Right 11/27/2018    Procedure: bone marrow biopsy;  Surgeon: Jacqueline Fitzgerald, NP;  [facility]      BONE MARROW BIOPSY, BONE SPECIMEN, NEEDLE/TROCAR N/A 2/8/2019    Procedure: BIOPSY BONE MARROW;  Surgeon: Lisa Workman NP;  [facility]      BONE MARROW BIOPSY, BONE SPECIMEN, NEEDLE/TROCAR N/A 5/16/2019    Procedure: BIOPSY, BONE MARROW;  Surgeon: Lilia López APRN CNP;  [facility]     BONE MARROW BIOPSY, BONE SPECIMEN, NEEDLE/TROCAR N/A 11/7/2019    Procedure: Bone marrow biopsy;  Surgeon: Jacqueline Shin, NP;  [facility]      ESOPHAGOSCOPY, GASTROSCOPY, DUODENOSCOPY (EGD), COMBINED N/A 2/22/2019    Procedure: Upper endoscopy with biopsy;  Surgeon: Magdalene Hobson MD;  [facility]      INSERT CATHETER VASCULAR ACCESS DOUBLE LUMEN CHILD N/A 10/26/2018    Procedure: double lumen tunneled line placement;  Surgeon: Rex Valente MD;  [facility]      IR CVC TUNNEL REMOVAL LEFT  2/8/2019     IR PORT REMOVAL LEFT  5/16/2019     O CLAVICLE LEFT Left     fracture with surgical repair and plate/screws     ORTHOPEDIC SURGERY      femur     REMOVE CATHETER VASCULAR ACCESS N/A 2/8/2019    Procedure: Tunneled line removal;  Surgeon: Krystal Esparza MD;  [facility]      REMOVE PORT VASCULAR ACCESS N/A 5/16/2019    Procedure: REMOVAL, VASCULAR ACCESS PORT;  Surgeon: Link Rios PA-C;  [facility]     None, Healthy  Patient has a medical history of  BMT November 2018    Social History:  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 Went to [place] in September; no other recent travel. Lives in [place].    Family History:  No family history on  "file.      Medications:  Current Outpatient Medications   Medication Sig Dispense Refill     cephALEXin (KEFLEX) 500 MG capsule Take 1 capsule (500 mg) by mouth 3 times daily for 7 days 21 capsule 0     metroNIDAZOLE (METROCREAM) 0.75 % external cream Apply topically 2 times daily 45 g 1     mupirocin (BACTROBAN) 2 % external ointment Use 2 times a day to affected area. 120 g 1     pantoprazole (PROTONIX) 40 MG EC tablet Take 1 tablet (40 mg) by mouth 2 times daily 30 tablet 3     sulfamethoxazole-trimethoprim (BACTRIM DS/SEPTRA DS) 800-160 MG per tablet Take 1 tablet by mouth Every Mon, Tues two times daily (Patient not taking: Reported on 11/7/2019) 20 tablet 3     tacrolimus (PROTOPIC) 0.1 % external ointment Apply topically 2 times daily (Patient not taking: Reported on 9/27/2019) 60 g 1     tretinoin (RETIN-A) 0.025 % external cream Use every night (Patient not taking: Reported on 11/7/2019) 45 g 3       Allergies   Allergen Reactions     Blood Transfusion Related (Informational Only) Other (See Comments)     Patient has a history of a clinically significant antibody against RBC antigens.  A delay in compatible RBCs may occur.Stem cell transplant patient.  Give type O RBCs.     No Known Drug Allergies        Review of Systems:  A 12 point ROS was performed today and was negative.    Physical exam:  Vitals: Ht 5' 9.02\" (175.3 cm)   Wt 66.3 kg (146 lb 2.6 oz)   BMI 21.57 kg/m    GEN: This is a well developed, well-nourished male in no acute distress, in a pleasant mood.    Eyes: conjunctivae clear  Neck: supple  Resp: breathing comfortably in no distress  CV: well-perfused, no cyanosis  Abd: no distension  Ext: no deformity, clubbing or edema  SKIN: Waist-up skin, which includes the head/face, neck, both arms, chest, back, abdomen, digits and/or nails was examined.  There are faint pink scaly plaques to posterior lower neck and scattered to the face. No desquamation of lips or palms.   -No other lesions of " concern on areas examined.       Impression/Plan:  1. Staph scalded skin syndrome, improvement noted prior to completion of 7 days course of antibiotics.     Complete course of cephalexin 500 mg TID for a total of 14 days. Patient and family requested refills as he will be out of town and does not want it to flare.    Continue daily bleach baths using 1/2 cup of plain bleach in the bath, reducing to once a week once flaring resolves    Continue liberal us of Vaseline    Continue to apply mupirocin mixed with Vaseline to the affected area after bathing daily, sample emollients provided today.    Samples of CLN wash and moisturizer given if patient does not have access to a bath tub when traveling.     Photodocumentation was obtained today    Follow-up in 3 months, earlier for new or changing lesions.       Staff Involved:  Scribe/Staff    Scribe Disclosure:   SHELIA, Dean Quintanilla, am serving as a scribe to document services personally performed by Garima Ocampo PA-C, based on data collection and the provider's statements to me.    Provider Disclosure:   The documentation recorded by the scribe accurately reflects the services I personally performed and the decisions made by me.    All risks, benefits and alternatives were discussed with patient.  Patient is in agreement and understands the assessment and plan.  All questions were answered.  Sun Screen Education was given.   Return to Clinic in 3 months or sooner as needed.   Garima Ocampo PA-C   [facility]

## 2019-11-27 LAB — COPATH REPORT: NORMAL

## 2019-12-03 ENCOUNTER — TELEPHONE (OUTPATIENT)
Dept: DERMATOLOGY | Facility: CLINIC | Age: 19
End: 2019-12-03

## 2019-12-03 NOTE — TELEPHONE ENCOUNTER
"Pts mother called on pts behalf explaining she feels like the dilute bleach baths are causing pts skin to become too dry. Mom is unsure how often pt is applying moisturizer but she was recently with him for the holiday and explained she did help him on occation apply CeraVe cream to his back. Mom reports pts skin is \"so dry it is difficult for him to walk.\" Mom inquired if the staph scalded skin was just on pts face or his entire body? Mom then inquired if just on the face, why the need for dilute bleach baths for his entire body and if they could just apply the dilute bleach water to his face. Mom also expressed concern for pts skin as \"it still looks the same.\" Mom confirmed in addition to the dilute bleach soaks pt has been taking his antibiotics but she does not feel pts skin is improved and wondering if they should be seeing results faster? RN explained to mom it is very important after any type of bath, pts apply a thick moisturizer, RN explained vaseline is great for the night time and CeraVe cream would be great during the day even if several applications were necessary., Explained to mom the winter weather changes are also playing a factor in pts dry skin, which is another reason he should be applying thick moisturizers. RN explained she would speak to Dr. Delacruz regarding moms concerns and questions and would follow up with Artemio. Mom was agreeable and denied questions or concerns. Routed to Dr. Delacruz.   "

## 2019-12-03 NOTE — TELEPHONE ENCOUNTER
If Artemio's face is still dry and red, they could add back the protopic oint that he had had in the past.If things are improved, they can back off on the bleach baths but continue daily bathing and moisturizing with a thick emollient head to toe.

## 2019-12-03 NOTE — TELEPHONE ENCOUNTER
Contacted pt, no answer and no voicemail set up. Will try to reach pt at different date and time.  Contacted pts mother, message from Dr. Delacruz was explained. Mom inquired about her original questions from first call and with this phone conversation.     Mom would like the following questions answered:  1. Is the staph scaled skin just on Artemio's face or his entire body?  2. Is it the staph scaled skin which is causing his entire body to be dry or the dilute bleach baths?  3. Mom feels pts skin is only slightly improved and pt is frustrated, mom is wondering since pt is 8 days into his oral antibiotic course if they should be seeing better results?  4. How long will this take to heal?  5. When does he return for follow up if this is not improving?    Mom inquired about the use of protopic? RN explained this is a non steroidal anti inflammatory and should help with some of the inflammation on pts face. Mom verbalized understanding. RN explained to mom she would readdress her questions with Dr. Delacruz and would contact her back once advisement was provided, mom verbalized understanding and denied questions or concerns.   Routed to  for advisement.

## 2019-12-05 NOTE — PROGRESS NOTES
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 Artemio was seen for a neuropsychological evaluation in the Pediatric Neuropsychology Clinic as part of his 1-year post-BMT follow-up.     Relevant History: Background information was gathered via interview with Artemio and review of available records.     Medical History: Prior to Artemio s cancer diagnosis, his medical history was largely uncomplicated. At age 14, he sustained a left comminuted and shortened mid clavicular fracture. He underwent left clavicle open reduction and internal fixation with a plate. At age 16, he sustained a closed stress fracture of his left fibula after soccer playing. He had no other major surgeries, had never been hospitalized, and never received blood transfusion. Family medical history is significant for heparin-induced thrombocytopenia and prostate cancer.    At age 17, on 2/5/18, Artemio presented at [facility] with fever, syncope, sore joints, and difficulty moving his right arm for the past two weeks without known injury. Bloodwork indicated B-Cell blasts, and he was referred for an Hematology/Oncology Consultation at [facility]. Testing completed at [facility] on 2/13/19 indicated acute lymphoblastic leukemia (ALL). His cytogenetic analysis showed IKZF1 deletion, which suggests Ph-like ALL. Molecular evaluation revelated a translocation indicative of JAK2 activation.     Between 2/15/18 - 3/15/18, Artemio received bone marrow biopsy and lumbar puncture at [facility].  Intrathecal chemotherapy (i.e., XVUH1104 and HBCF1004 study) was initiated. 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 His end of induction marrow on 3/15/18 showed 31% blasts per [place] lab, 8% by morphology and was determined have shown induction failure.     He was subsequently removed from study. Artemio s primary oncologist at [facility], Dr. Paniagua, referred him to Dr. Zavala at [facility] for bone marrow transplant. Artemio received four days of total body irradiation (TBI) conditioning followed by matched sibling donor transplant on 11/2/18. He engrafted, with no signs of GVHD. Day +15 bone marrow biopsy showed 20-30% cellularity with trilineage hematopoesis, flow negative, 100% donor. He is scheduled to have his next bone marrow biopsy and donor studies at 1 year and 2 years post-transplant. He continues to be prescribed ruxolitinib (Jakafi) and Sulfamethoxazole (Bactrim).     Fortunately, disease and treatment associated complications have been fairly minimal. No known organ compromise, no remarkable infectious history, and no significant history nor perceived bleeding susceptibility. He required one transfusion of platelets. Artemio experienced intermittent nausea, low energy and appetite throughout treatment, but all side-effects have resolved. He has recently been feeling well with good energy and appetite; he is working full-time and playing sports again. Artemio reports concern about his hearing in his right ear, and plans to discuss this at his next oncology appointment in two weeks.     Family/Psychosocial History: Artemio lives with his paternal grandparents in [place], [place].  Artemio s father and step-mother live nearby, and cared for him for several months after he was discharged from his cancer treatment. His mother is involved in his care as well, but currently lives out of state. Aretmio also has an older brother and sister. He enjoys socializing with family and friends, playing soccer in an adult league, and playing video games. Artemio reports that he has felt physically healthy for the past several months, and also indicated no mental health or cognitive concerns.     School and Work History: Early developmental history was normal. Artemio finished high school and one semester of college at which point he decided to leave college and begin working. He initially worked as a  with his father occasionally. 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 Artemio enjoys his work and does not plan to return to post-secondary education.     Behavioral Observations: Artemio was seen for one day of testing and presented to the appointment alone. He presented as an appropriately dressed, well-groomed individual appeared his chronological age. Artemio happily greeted the examiner and engaged in casual conversation throughout the evaluation. He made appropriate eye contact with the examiner, and readily engaged the examiner in reciprocal conversation and rapport was quickly established. His emotional expressiveness was appropriate, and throughout the assessment he was upbeat. Language comprehension appeared normal. Speech in terms of tone, prosody, rate, and grammatical structure was normal. No fine or gross motor abnormalities were observed through testing. Artemio used his right hand for writing. His hearing and vision were appropriate for testing purposes.     During testing, Artemio was hard-working and cooperative, with no notable distractibility, typical activity level, and mild impulsivity. On timed items, Artemio often answered quickly without  carefully considering all of the potential choices for a question. He occasionally self-corrected incorrect responses. During testing, he stayed on task and required few redirections.     Overall, Artemio was very pleasant, effortful, and took the work seriously. Given his compliant responding, the following test results are thought to be a valid representation of Artemio s current level of functioning in an optimal (i.e., quiet, one-on-one) environment.    Neuropsychological Evaluation Methods and Instruments  Review of Records  Clinical behavioral observations  Clinical Interview  Wechsler Adult Intelligence Scale, 4th Ed.  Test of Variables of Attention, Visual  Malou-Renae Executive Function System   Color-Word Interference   Verbal Fluency   Trail Making Test  Purdue Pegboard  Drake-Osterrieth Complex Figure Drawing Test  [place] Verbal Learning Test, Third Edition   Behavior Rating Inventory of Executive Function - Adult Version  Behavioral Assessment System for Children, 3rd Edition, Self-Report Form  A full summary of test scores is provided in tables at the end of this report.    Impressions and Recommendations: It is important to consider the findings of the evaluation within the context of Artemio s complex medical history. Acute Lymphoblastic Leukemia (ALL) is a type of cancer where bone marrow makes too many immature lymphocytes (white blood cells). Immature lymphocytes do not fight infection well and as the number of cells increases there is less room for healthy cells which can lead to infection, anemia, and easy bleeding. Artemio underwent bone marrow transplant (BMT) as treatment for ALL. This places him at risk because the chemotherapy regimen and total body irradiation to prepare for BMT can be associated with late effects that occur months or years after treatment has ended, which can affect growth and development, emotional and behavioral functioning, and neurocognitive abilities (e.g.,  fine motor, attentional, and executive functioning difficulties).     Artemio s evaluation indicated that he is functioning within the broadly average range across all assessed domains. His intellectual functioning is average. Artemio s sustained attention was also average on testing. Highly related to attention is executive functioning. Broadly, executive functions are the skills necessary to regulate cognition and behavior. These skills include cognitive flexibility, the ability to plan, inhibit impulses, generate new information or solutions, and hold information in working memory. On a questionnaire assessing Artemio s executive skills, he indicated no concerns. On measures directly examining his executive skills, Artemio s performance was consistently average. While memory is sometimes affected by cancer treatment, Artemio s verbal and visual memory was average on testing. Similarly, although research has shown that radiation therapy, particularly in young children, has adverse effects on fine-motor skills and visual-motor integration, Artemio did not show prominent deficits in these areas. Together, results indicate that Artemio is capable of pursuing and striving in further education or occupational training, if he chooses to do so.     Finally, Artemio s responses on a questionnaire assessing his emotional and behavioral functioning indicated no pressing concerns. His response did indicate poor attitude towards school and teacher. As Artemio is not currently in school, but answered the questions with respect to how he felt when previously in school, these measures are both irrelevant and not necessarily reliable. Overall, results indicate that Artemio is a hard-working, amiable, and resilient young man. His neurocognitive functioning should continue to be regularly monitored, particularly if concerns arise. It is possible for years to pass before cognitive effects of cancer treatment emerge.  Otherwise, in the absence of reported or observed cognitive deficits, there are no specific recommendations indicated at this time.    Diagnoses:  C91.01   Acute lymphoblastic leukemia, in remission      It has been a pleasure working with Artemio. If you have any questions or concerns regarding this evaluation, please call the Pediatric Neuropsychology Department at [phone].           Ольга Dennis M.A.      Demian Hills Ph.D., L.P.   Doctoral Practicum Student      of Pediatrics and Neurology  Pediatric Neuropsychology     Section Head, Pediatric Neuropsychology  [facility]     Division of Pediatric Clinical Neuroscience          [facility]                 PEDIATRIC NEUROPSYCHOLOGY CLINIC TEST SCORES     Note: These scores are intended for appropriately licensed professionals and should never be interpreted without consideration of the attached narrative report.    The test data listed below use one or more of the following formats:       Standard Scores have an average of 100 and a standard deviation of 15 (the average range is 85 to 115).   Scaled Scores have an average of 10 and a standard deviation of 3 (the average range is 7 to 13).   T-Scores have an average of 50 and a standard deviation of 10 (the average range is 40 to 60).   Z-Scores have an average of 0 and a standard deviation of 1 (the average range is -1 to +1).       COGNITIVE FUNCTIONING     Wechsler Adult Intelligence Scale, Fourth Edition   Standard scores from 85 - 115 represent the average range of functioning.  Scaled scores from 7 - 13 represent the average range of functioning.    Index Standard Score   Verbal Comprehension 102   Perceptual Reasoning 107   Working Memory 92   Processing Speed 97   Full Scale      Subtest Scaled Score   Similarities 10   Vocabulary 10   Information 11   Block Design 13   Matrix Reasoning 8   Visual Puzzles 13   Digit Span    10   Arithmetic    7   Symbol Search 10   Coding 9             ATTENTION AND EXECUTIVE FUNCTIONING   Test of Variables of Attention, Visual  Scores from 85 - 115 represent the average range of functioning.          Measure  Quarter 1  Quarter 2  Quarter 3  Quarter 4  Total    Omissions  102  102 105 107  108   Commissions  114  112 120 118  121   Response Time  97 93 115 114 110   Variability  80  106 123 115 103        Malou-Renae Executive Function System Color-Word Interference Test  Scaled Scores from 7 - 13 represent the average range of functioning.    Measure Scaled Score   Color Naming 11   Word Reading 7   Inhibition 12   Inhibition/Switching 8     Malou-Renae Executive Function System Verbal Fluency Test  Scaled Scores from 7 - 13 represent the average range of functioning.    Measure Scaled Score   Letter Fluency 11   Category Fluency 13   Category Switching Total Correct 8   Category Switching Total Switching Accuracy 8      Error Scaled Score   Percent Set-Loss Error 11   Percent Repetition Error 10   Category Switching Percent Switching Accuracy 9     Malou-Renae Executive Function System Trail Making Test  Scaled Scores from 7 - 13 represent the average range of functioning.    Measure Scaled Score   Visual Scanning 13   Number Sequencing 13   Letter Sequencing 13   Number-Letter Switching 10   Motor Speed   12       Behavior Rating Inventory of Executive Function - Adult Version  T-scores 65 and higher are considered to be in the  clinically significant  range.    Index/Scale T-Score   Inhibit 46   Shift 51   Emotional Control 43   Self-Monitor  Behavioral Regulation Index 59  48   Initiate 47   Working Memory 39   Plan/Organize  Task-Monitor 44  36   Organization of Materials 45   Metacognition Index 41   Global Executive Composite 44       MEMORY/ORIENTATION FUNCTIONING     [place] Verbal Learning Test, Third Edition   Standard scores from 85 - 115 represent the average range of functioning.  Scaled scores from  7 - 13 represent the average range of functioning    Index   Raw Score        Standard Score  Total           43         90      Immediate Recall Raw Score Scaled Score  Trial 1 Correct         5          8  Trial 2 Correct          8          9  Trial 3 Correct        10          9  Trial 4 Correct         7          5  Trial 5 Correct         12          9      Delayed Recall  Raw Score Scaled Score  Short Delay Free         10          9  Short Delay Cued         11          9  Long Delay Free         12         10  Long Delay Cued         11          8       Yes/No Recognition Raw Score Scaled Score  Total Hits          14                       7  Total False Positives*          1                        9  Discriminability          3                        9  Nonparametric          94                       9  Total Intrusions*          1                       12  *A lower raw score is better           FINE-MOTOR AND VISUAL-MOTOR FUNCTIONING     Purdue Pegboard   Standard scores from 85 - 115 represent the average range of functioning.       Trial  Pegs Placed  Standard Score   Dominant (R)  14  87   Non-Dominant   14 92   Both Hands  11 pairs   75     Drake-Osterrieth Complex Figure Drawing Test  Standard scores from 85 - 115 define the average range of functioning.    Task Standard Score   Copy 98   Delay 95           EMOTIONAL AND BEHAVIORAL FUNCTIONING   For the Clinical Scales on the BASC-3, scores ranging from 60-69 are considered to be in the  at-risk  range and scores of 70 or higher are considered  clinically significant.  For the Adaptive Scales, scores between 30 and 39 are considered to be in the  at-risk  range and scores of 29 or lower are considered  clinically significant.       Behavioral Assessment System for Children, 3rd Edition, Self-Report Form    Clinical Scales T-Score  Adaptive Scales T-Score   Attitude to School 107  Relations with Parents 44   Attitude to Teachers 73  Interpersonal  Relations 52   Sensation Seeking 54  Self-Esteem 61   Atypicality 43  Self-Reliance 54   Locus of Control 45      Social Stress 48  Composite Indices    Anxiety 41  School Problems 87   Depression 59  Internalizing Problems 45   Sense of Inadequacy 46  Inattention/Hyperactivity 39   Somatization 40  Emotional Symptoms Index 45   Attention Problems 34  Personal Adjustment 54   Hyperactivity 47        Time Spent: professional time, including face-to-face interview and feedback review, data integration, and report writing ([id number]) for a total of 4 hours for Dr. Demian Hills, Ph.D., L.P.; 1 unit trainee scoring under supervision of a neuropsychologist ([id number]);  trainee testing and scoring under supervision of a neuropsychologist ([id number]) for a total of 4 hours for Ольга Dennis M.A. under the supervision of Dr. Demian Hills, Ph.D., L.P.    HERBERT NOBLE

## 2020-01-11 DIAGNOSIS — C91.01 ACUTE LYMPHOBLASTIC LEUKEMIA (ALL) IN REMISSION (H): Primary | ICD-10-CM

## 2020-01-11 DIAGNOSIS — Z94.81 S/P ALLOGENEIC BONE MARROW TRANSPLANT (H): ICD-10-CM

## 2020-02-12 DIAGNOSIS — Z94.81 S/P ALLOGENEIC BONE MARROW TRANSPLANT (H): ICD-10-CM

## 2020-02-12 DIAGNOSIS — C91.01 ACUTE LYMPHOBLASTIC LEUKEMIA (ALL) IN REMISSION (H): Primary | ICD-10-CM

## 2020-05-28 ENCOUNTER — TELEPHONE (OUTPATIENT)
Dept: PEDIATRIC HEMATOLOGY/ONCOLOGY | Facility: CLINIC | Age: 20
End: 2020-05-28

## 2020-06-05 ENCOUNTER — CARE COORDINATION (OUTPATIENT)
Dept: TRANSPLANT | Facility: CLINIC | Age: 20
End: 2020-06-05

## 2020-06-05 ENCOUNTER — RESULTS ONLY (OUTPATIENT)
Dept: OTHER | Facility: CLINIC | Age: 20
End: 2020-06-05

## 2020-06-05 ENCOUNTER — ONCOLOGY VISIT (OUTPATIENT)
Dept: TRANSPLANT | Facility: CLINIC | Age: 20
End: 2020-06-05
Attending: PEDIATRICS
Payer: COMMERCIAL

## 2020-06-05 VITALS
TEMPERATURE: 98.1 F | BODY MASS INDEX: 19.82 KG/M2 | SYSTOLIC BLOOD PRESSURE: 106 MMHG | DIASTOLIC BLOOD PRESSURE: 63 MMHG | WEIGHT: 133.82 LBS | HEIGHT: 69 IN | HEART RATE: 86 BPM | OXYGEN SATURATION: 100 % | RESPIRATION RATE: 20 BRPM

## 2020-06-05 DIAGNOSIS — C91.01 ACUTE LYMPHOBLASTIC LEUKEMIA (ALL) IN REMISSION (H): Primary | ICD-10-CM

## 2020-06-05 DIAGNOSIS — Z94.81 S/P ALLOGENEIC BONE MARROW TRANSPLANT (H): ICD-10-CM

## 2020-06-05 DIAGNOSIS — C91.02 ACUTE LYMPHOBLASTIC LEUKEMIA (ALL) IN RELAPSE (H): Primary | ICD-10-CM

## 2020-06-05 DIAGNOSIS — C91.00 ALL (ACUTE LYMPHOCYTIC LEUKEMIA) (H): Primary | ICD-10-CM

## 2020-06-05 DIAGNOSIS — C91.01 ACUTE LYMPHOBLASTIC LEUKEMIA (ALL) IN REMISSION (H): ICD-10-CM

## 2020-06-05 PROCEDURE — 86665 EPSTEIN-BARR CAPSID VCA: CPT | Performed by: PEDIATRICS

## 2020-06-05 PROCEDURE — 86832 HLA CLASS I HIGH DEFIN QUAL: CPT | Performed by: PEDIATRICS

## 2020-06-05 PROCEDURE — 86828 HLA CLASS I&II ANTIBODY QUAL: CPT | Performed by: PEDIATRICS

## 2020-06-05 PROCEDURE — 86644 CMV ANTIBODY: CPT | Performed by: PEDIATRICS

## 2020-06-05 PROCEDURE — 86803 HEPATITIS C AB TEST: CPT | Performed by: PEDIATRICS

## 2020-06-05 PROCEDURE — 86695 HERPES SIMPLEX TYPE 1 TEST: CPT | Performed by: PEDIATRICS

## 2020-06-05 PROCEDURE — 87516 HEPATITIS B DNA AMP PROBE: CPT | Performed by: PEDIATRICS

## 2020-06-05 PROCEDURE — 87389 HIV-1 AG W/HIV-1&-2 AB AG IA: CPT | Performed by: PEDIATRICS

## 2020-06-05 PROCEDURE — G0463 HOSPITAL OUTPT CLINIC VISIT: HCPCS | Mod: ZF

## 2020-06-05 PROCEDURE — 81268 CHIMERISM ANAL W/CELL SELECT: CPT | Performed by: PEDIATRICS

## 2020-06-05 PROCEDURE — 87535 HIV-1 PROBE&REVERSE TRNSCRPJ: CPT | Performed by: PEDIATRICS

## 2020-06-05 PROCEDURE — 86790 VIRUS ANTIBODY NOS: CPT | Performed by: PEDIATRICS

## 2020-06-05 PROCEDURE — 87340 HEPATITIS B SURFACE AG IA: CPT | Performed by: PEDIATRICS

## 2020-06-05 PROCEDURE — 87521 HEPATITIS C PROBE&RVRS TRNSC: CPT | Performed by: PEDIATRICS

## 2020-06-05 PROCEDURE — 87798 DETECT AGENT NOS DNA AMP: CPT | Performed by: PEDIATRICS

## 2020-06-05 PROCEDURE — 86704 HEP B CORE ANTIBODY TOTAL: CPT | Performed by: PEDIATRICS

## 2020-06-05 PROCEDURE — 36415 COLL VENOUS BLD VENIPUNCTURE: CPT | Performed by: PEDIATRICS

## 2020-06-05 PROCEDURE — 86780 TREPONEMA PALLIDUM: CPT | Performed by: PEDIATRICS

## 2020-06-05 PROCEDURE — 86696 HERPES SIMPLEX TYPE 2 TEST: CPT | Performed by: PEDIATRICS

## 2020-06-05 ASSESSMENT — PAIN SCALES - GENERAL: PAINLEVEL: NO PAIN (0)

## 2020-06-05 ASSESSMENT — MIFFLIN-ST. JEOR: SCORE: 1603.25

## 2020-06-05 NOTE — PROGRESS NOTES
June 5, 2020    Dear Dr. Paniagua,    We had the pleasure of seeing Artemio Nino in our [facility] today. 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 He returns today for routine follow up.     We last saw Artemio in Nov 2019, Ruxolitinib was stopped at this time. He has not had any follow up in the interim. Artemio has not had any infections or illnesses, he has not required hospital admission. He does report dizzyness on standing in the last 2-3 weeks. He has not had any LOC or falls. In the last 1-2 weeks he has also had daily occipital headaches, without any visual changes. Headaches are mild, not requiring analgesia and not interfering with his day to day activities. He has noticed a petechial rash to his legs during this time as well. He denies any bruising or bleeding. 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 Artemio is currently living his grandparents, which he reports is going well. 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 He denies smoking, including marijuana and drinking alcohol.    Unfortunately CBC confirmed our clinical suspicion that Artemio has had a relapse of his leukemia. WBC was 7.3, ANC 0.2, Hgb 5.3 and platelets were 11. There were 15% peripheral blasts. Renal and liver function were normal today. Artemio's father was not present for initial consultation, but joined us for the following discussion.     Artemio has high risk leukemia, and we are very sorry that he has relapsed, 19 months post his MSD transplant from his brother. We discussed with Artemio and his father that there are definitive treatment options. [facility] and for BMB and LP. Initial  disease was CD19 and CD22 positive. We await immunophenotyping from peripheral blood today, and marrow tomorrow. Artemio's options for definite therapy are CAR-T cell therapy or a second stem cell transplant.    Our discussion included the side effects and expected complications with Mylaiah. the CART process, including determining timing and utility of this therapy, apheresis collection of lymphocytes for manufacturing, organ evaluation, lymphodepleting chemotherapy, CART infusion, and the expected post-infusion course, including criteria for discharge from the hospital as well as expected and rare complications   This includes but is not limited to cytokine release syndrome, fever, hypotension, renal failure, respiratory failure, and neurological toxicity. Artemio Nino and his father was informed that 80% of patients will have cytokine release syndrome.  Of these, 50% of them have grade 3-4 toxicity. This typically happens within 8 days of receiving the Kymriah product and that monitoring during this time is critical. There is a 92%  success rate of the manufacturing.  If the there would be a manufacturing failure, we would have to recollect. I would like the bone marrow blasts to be less than 50%, at time of infusion, as that will decrease the chance of grade 3-4 CRS. Once the cells are manufactured, we will have a mini workup week to ensure appropriate organ function and no infection.  A bone marrow biopsy and lumbar puncture will be performed in order to know what the leukemia burden is prior to receiving the product. Artemio Nino would receive chemotherapy with fludarabine and Cytoxan.  The product will then be infused along with the appropriate premedications.     We discussed that the chemotherapy can be administered as an outpatient. Sometimes CART infusion and initial monitoring can also happen on an outpatient basis. Admission would be required at the first onset of fever or being unwell. There  "are circumstances where infusion and initial monitoring is performed as inpatient, particularly if there is a relatively high burden of disease, as the risks of grade 3-4 CRS are higher. We also discussed that there is still a risk of relapse post CART. If he was MRD positive post CART or relapsed, we would recommend a second transplant.    Proceeding to second transplant as next definitive therapy is also an option. Artemio would require chemotherapy and to be in a good remission prior to proceeding to transplant. However, given the reduced toxicity of CART, the increased likelihood of shorter admission times and quicker recovery, and the good change of achieving a good remission, with the expectation that his relapsed disease is CD19 positive, we would recommend proceeding to CART ([facility]) as next definitive therapy. If he were to choose this option, we would aim to apherese as soon as possible. He would require some interim therapy, but to maintain disease control and potentially reduce disease burden, rather than high intensity therapy to achieve remission.        Review of Systems: Pertinent positives include those mentioned in interval events. A complete review of systems was performed and is otherwise negative.       Family History: Unchanged from previous visit    Social History: Lives with his grandmother. 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 Had planned to move to [place], where his mother is, and to work in her Cookie business. His moved was initially delayed due to border closures with COVID.     Medications:  No regular medications.      Physical Exam:  /63 (BP Location: Left arm, Patient Position: Standing, Cuff Size: Adult Regular)   Pulse 86   Temp 98.1  F (36.7  C) (Oral)   Resp 20   Ht 1.754 m (5' 9.06\")   Wt 60.7 kg (133 lb 13.1 oz)   SpO2 100%   BMI 19.73 kg/m    Karnofsky is 100  GEN: Sitting on exam table. Awake and alert and in no apparent distress. Pale.  HEENT full head of " hair.    LYMPH: no adenopathy  CARD: Regular rate and rhythm. Heart sounds dual, no murmurs, rubs or gallops. Cap refill <2 seconds.  RESP: Clear on auscultation, good bilateral air entry. No increased work of breathing, crackles or wheezes.   ABD: Non-distended, non-tender, no organomegaly  EXTREM: No edema noted  SKIN: Petechiae to lower legs bilaterally.     Labs:    CBC: WBC 7.3, ANC 0.2, ALC 5.7, 15% blasts. Hgb 5.3 Plt 11.   BUN 14, Cr 74. LFT normal.  Flow cytometry (peripheral blood) pending.     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 Following discussion with Dr Paniagua, [facility] for transfusion support and BMB/LP. We will arrange a video visit with Artemio and both his parents, for Monday 6/8/20 to further discuss potential treatment options  Including CAR T-cells and second transplant, which we have discussed in brief today. If Artemio's Leukemia is CD19 positive, we would recommend to proceed with CART therapy as soon as able, with bridging chemotherapy to maintain disease control.     ALL and BMT: High risk B cell ALL (CNS negative) + JAK2 activation / BMT: bone marrow biopsy prior to transplant (10/15) MRD 0.006%. Conditioning per 2015-29: TBI days -4 through -1 followed by matched sibling donor transplant on 11/2 Engrafted day +15,bone marrow biopsy showed 20-30% cellularity with trilineage hematopoiesis, flow negative,100% donor.   Day +180 engraftment: CD33 - 100% donor, CD3 - 99% donor; BM examination showed no evidence of ALL.  Day +360 engraftment: CD 33- 96% donor, CD3 - 99% donor; BM examination showed no evidence of ALL. Ruxolitinib discontinued on 11/6/19.     CBC today (6/5/20), +19 months showed evidence of leukemia relapse. Flow pending.       GVHD: Post-transplant Cytoxan on days +3 and +4 and now off tacrolimus. No signs of GvHD  to date.    FEN/Renal:  No issues. Normal renal and liver function on labs today.    GI:  Previous history of reflux and heartburn. Now no longer an issue and not on any therapy currently.    ID: Bactrim for PJO prophylaxis discontinued 11/6/19. Was planning to commence vaccinations today, 6 months after completion of Ruxolitinib. Now deferred in view of relapse.     Pulmonary:  History of pneumomediastinum: finding upon work up Chest CT (10/22/19), asymptomatic. Per radiology, likely benign, perhaps transient. No acute pulmonary concerns.       Cardiovascular: Aaron-Parkinson-White Syndrome: diagnosed upon syncope in 02/2018. Asymptomatic, no interventions to date. Work up EKG c/w WPW, sinus bradycardia at 49 bpm. Work up ECHO normal with EF 68%.  No current concerns  11/6: Recent review with Cardiology in September 2019. EKG and echocardiogram repeated and no concerns noted. Recommended review with adult cardiology service in 2 years (due September 2021).      Derm: Continue review with dermatology and topical therapy as per their recommendations. No current rashes. Dry skin with no topical therapies required.     Fertility: BMT and chemotherapy have an adverse effect on fertility, they do not necessarily cause infertility. Contraception is recommended.     In Summary, we are sorry that Artemio's leukemia has relapsed. We will await flow cytometry and virals from today. We recommend CART therapy as next best treatment, with the goal to proceed as soon as able. We will meet again with Artemio and his family via video conference on Monday 6/5/20. We will continue to Liaise regarding Artemio's ongoing care.      Sincerely,     Camryn Mao MD  Pediatric BMT Fellow    Viky Zavala MD, PhD    Pediatric Blood and Marrow Transplant  [facility]    I, Viky Zavala MD PhD, saw this patient with the fellow and agree with the fellow s findings and plan  of care as documented in the fellow s note.

## 2020-06-05 NOTE — NURSING NOTE
"Chief Complaint   Patient presents with     RECHECK     Patient is here today for ALL follow up     /66 (BP Location: Right arm, Patient Position: Fowlers, Cuff Size: Adult Regular)   Pulse 86   Temp 98.1  F (36.7  C) (Oral)   Resp 20   Ht 1.754 m (5' 9.06\")   Wt 60.7 kg (133 lb 13.1 oz)   SpO2 100%   BMI 19.73 kg/m      No Pain (0)  Data Unavailable    I have reviewed the patients medication and allergy list.    Patient needs refills: no    Dressing change needed? No    EKG needed? No    Donna Andrews LPN  June 5, 2020  "

## 2020-06-05 NOTE — PATIENT INSTRUCTIONS
Return to [facility] for outpatient leukapheresis workup once we have insurance approval (to be scheduled by complex BMT schedulers TARIK)    Infusion needs: none    Patient has no line, to be drawn off of per lab.     Medication changes: none    Care plan changes: hold 1 year immunizations and [facility]    Contact information  During business hours (7:30am-4:30pm):   To leave a non-urgent voicemail: call triage line [phone]    To call for time-sensitive needs or concerns : call clinic  [phone]    Evenings after 4:30pm, weekends, and holidays:   For any needs or concerns: call for BMT fellow at [phone] in the case of an emergency    Thank you!       For BMT schedulers to schedule as of 6/8 at 1032am MILLER

## 2020-06-06 LAB
CMV IGG SERPL QL IA: 0.2 AI (ref 0–0.8)
EBV VCA IGG SER QL IA: >8 AI (ref 0–0.8)
HBV CORE AB SERPL QL IA: NONREACTIVE
HBV SURFACE AG SERPL QL IA: NONREACTIVE
HCV AB SERPL QL IA: NONREACTIVE
HIV 1+2 AB+HIV1 P24 AG SERPL QL IA: NONREACTIVE
HSV1 IGG SERPL QL IA: <0.2 AI (ref 0–0.8)
HSV2 IGG SERPL QL IA: <0.2 AI (ref 0–0.8)
T PALLIDUM AB SER QL: NONREACTIVE

## 2020-06-07 LAB — HTLV I+II AB PATRN SER IB-IMP: NEGATIVE

## 2020-06-08 ENCOUNTER — HOSPITAL ENCOUNTER (OUTPATIENT)
Facility: CLINIC | Age: 20
End: 2020-06-08
Payer: COMMERCIAL

## 2020-06-08 ENCOUNTER — TRANSFERRED RECORDS (OUTPATIENT)
Dept: TRANSPLANT | Facility: CLINIC | Age: 20
End: 2020-06-08

## 2020-06-08 ENCOUNTER — VIRTUAL VISIT (OUTPATIENT)
Dept: TRANSPLANT | Facility: CLINIC | Age: 20
End: 2020-06-08
Attending: PEDIATRICS
Payer: COMMERCIAL

## 2020-06-08 ENCOUNTER — CARE COORDINATION (OUTPATIENT)
Dept: TRANSPLANT | Facility: CLINIC | Age: 20
End: 2020-06-08

## 2020-06-08 DIAGNOSIS — C91.00 ALL (ACUTE LYMPHOBLASTIC LEUKEMIA OF INFANT) (H): ICD-10-CM

## 2020-06-08 DIAGNOSIS — C91.02 ACUTE LYMPHOBLASTIC LEUKEMIA (ALL) IN RELAPSE (H): Primary | ICD-10-CM

## 2020-06-08 DIAGNOSIS — C91.00 ALL (ACUTE LYMPHOCYTIC LEUKEMIA) (H): Primary | ICD-10-CM

## 2020-06-08 DIAGNOSIS — Z11.59 ENCOUNTER FOR SCREENING FOR OTHER VIRAL DISEASES: Primary | ICD-10-CM

## 2020-06-08 DIAGNOSIS — C91.01 ACUTE LYMPHOBLASTIC LEUKEMIA (ALL) IN REMISSION (H): ICD-10-CM

## 2020-06-08 LAB
COPATH REPORT: NORMAL
MPX SERIES: NONREACTIVE
T CRUZI AB SER DONR QL: NONREACTIVE
WNV RNA SPEC QL NAA+PROBE: NONREACTIVE

## 2020-06-08 ASSESSMENT — PAIN SCALES - GENERAL: PAINLEVEL: NO PAIN (0)

## 2020-06-08 NOTE — NURSING NOTE
"Artemio Nino is a 19 year old male who is being evaluated via a billable video visit.      The patient has been notified of following:     \"This video visit will be conducted via a call between you and your physician/provider. We have found that certain health care needs can be provided without the need for an in-person physical exam.  This service lets us provide the care you need with a video conversation.  If a prescription is necessary we can send it directly to your pharmacy.  If lab work is needed we can place an order for that and you can then stop by our lab to have the test done at a later time.    If during the course of the call the physician/provider feels a video visit is not appropriate, you will not be charged for this service.\"     Patient has given verbal consent for Video visit? Yes    Patient would like the video invitation sent by: Text to cell phone: [phone], [phone], [email or web address], and [email or web address]    Video Start Time: 8:26 AM    Artemio Nino complains of    Chief Complaint   Patient presents with     RECHECK     Patient is here for ALL follow up       No Pain (0)  Data Unavailable      I have reviewed and updated the patient's Past Medical History, Social History, Family History and Medication List.    ALLERGIES  Blood transfusion related (informational only) and No known drug allergies      "

## 2020-06-08 NOTE — PROGRESS NOTES
"Artemio Nino is a 19 year old male who is being evaluated via a billable video visit.      The patient has been notified of following:     \"This video visit will be conducted via a call between you and your physician/provider. We have found that certain health care needs can be provided without the need for an in-person physical exam.  This service lets us provide the care you need with a video conversation.  If a prescription is necessary we can send it directly to your pharmacy.  If lab work is needed we can place an order for that and you can then stop by our lab to have the test done at a later time.    Video visits are billed at different rates depending on your insurance coverage.  Please reach out to your insurance provider with any questions.    If during the course of the call the physician/provider feels a video visit is not appropriate, you will not be charged for this service.\"    Patient has given verbal consent for Video visit? Yes    How would you like to obtain your AVS? Dov    Patient would like the video invitation sent by: Text to cell phone: [phone] Isael, [phone] [email or web address], [email or web address]    Will anyone else be joining your video visit? Yes: see above. How would they like to receive their invitation? Text to cell phone: see above        Video-Visit Details    Type of service:  Video Visit    Video Start Time: 8:50  Video End Time: 9:45    Originating Location (pt. Location): Home    Distant Location (provider location):  [facility] BLOOD AND MARROW TRANSPLANT     Platform used for Video Visit: Sophia Zavala MD    I spent 45 minutes of face to face counseling discussing the treatment of Artemio's relapsed leukemia.  Artemio was present, biological mom was present, step-mom and dad was present.      Our discussion included the side effects and expected complications with Kymriah. the CART process, including determining timing and " utility of this therapy, apheresis collection of lymphocytes for manufacturing, organ evaluation, lymphodepleting chemotherapy, CART infusion, and the expected post-infusion course, including criteria for discharge from the hospital as well as expected and rare complications   This includes but is not limited to cytokine release syndrome, fever, hypotension, renal failure, respiratory failure, and neurological toxicity.  Artemio Nino was informed that 79% of patients will have cytokine release syndrome.  Of these, 50% of them have grade 3-4 toxicity. This typically happens within 8 days of receiving the [facility] product and that monitoring during this time is critical. There is a 92%  success rate of the manufacturing.  If the there would be a manufacturing failure, we would have to recollect. I would like the bone marrow blasts to be less than 50% as that will decrease thr chance of grade 3-4 CRS. Once the cells are manufactured, we will have a mini workup week to ensure appropriate organ function and no infection.  A bone marrow biopsy and lumbar puncture will be performed in order to know what the leukemia burden is prior to receiving the product.  Artemio Nino receive chemotherapy with fludarabine and Cytoxan.  The product will then be infused along with the appropriate premedications.  I gave anticipatory guidance in regards to fever monitoring and readmission criteria.    The family understood the plan and the severity of CRS and neurotoxicity.    Thank you very much for this consultation and we will be in contact with you.  He will get a marrow and LP this week as well as a port placed and apheresis catheter.  We will plan on collecting him Thursday.  I want to make sure that his blasts express high levels of CD19.        Viky Zavala MD, PhD    Pediatric Blood and Marrow Transplant  [facility]

## 2020-06-09 ENCOUNTER — ANESTHESIA EVENT (OUTPATIENT)
Dept: PEDIATRICS | Facility: CLINIC | Age: 20
End: 2020-06-09
Payer: COMMERCIAL

## 2020-06-09 ENCOUNTER — ALLIED HEALTH/NURSE VISIT (OUTPATIENT)
Dept: TRANSPLANT | Facility: CLINIC | Age: 20
End: 2020-06-09
Attending: PEDIATRICS
Payer: COMMERCIAL

## 2020-06-09 DIAGNOSIS — C91.02 ACUTE LYMPHOBLASTIC LEUKEMIA (ALL) IN RELAPSE (H): Primary | ICD-10-CM

## 2020-06-09 DIAGNOSIS — C91.01 ACUTE LYMPHOBLASTIC LEUKEMIA (ALL) IN REMISSION (H): ICD-10-CM

## 2020-06-09 DIAGNOSIS — C91.00 ALL (ACUTE LYMPHOCYTIC LEUKEMIA) (H): ICD-10-CM

## 2020-06-09 LAB
SA1 CELL: NORMAL
SA1 COMMENTS: NORMAL
SA1 HI RISK ABY: NORMAL
SA1 MOD RISK ABY: NORMAL
SA1 TEST METHOD: NORMAL
SARS-COV-2 PCR COMMENT: NORMAL
SARS-COV-2 RNA SPEC QL NAA+PROBE: NEGATIVE
SARS-COV-2 RNA SPEC QL NAA+PROBE: NORMAL
SCR 1 TEST METHOD: NORMAL
SCR1 CELL: NORMAL
SCR1 COMMENTS: NORMAL
SCR1 RESULT: NORMAL
SCR2 CELL: NORMAL
SCR2 COMMENTS: NORMAL
SCR2 RESULT: NORMAL
SCR2 TEST METHOD: NORMAL
SPECIMEN SOURCE: NORMAL
SPECIMEN SOURCE: NORMAL

## 2020-06-09 PROCEDURE — U0003 INFECTIOUS AGENT DETECTION BY NUCLEIC ACID (DNA OR RNA); SEVERE ACUTE RESPIRATORY SYNDROME CORONAVIRUS 2 (SARS-COV-2) (CORONAVIRUS DISEASE [COVID-19]), AMPLIFIED PROBE TECHNIQUE, MAKING USE OF HIGH THROUGHPUT TECHNOLOGIES AS DESCRIBED BY CMS-2020-01-R: HCPCS | Performed by: PEDIATRICS

## 2020-06-09 PROCEDURE — 40000268 ZZH STATISTIC NO CHARGES: Mod: TEL,ZF

## 2020-06-09 SDOH — HEALTH STABILITY: MENTAL HEALTH: HOW OFTEN DO YOU HAVE A DRINK CONTAINING ALCOHOL?: NEVER

## 2020-06-10 ENCOUNTER — HOSPITAL ENCOUNTER (OUTPATIENT)
Dept: LAB | Facility: CLINIC | Age: 20
End: 2020-06-10
Attending: NURSE PRACTITIONER
Payer: COMMERCIAL

## 2020-06-10 ENCOUNTER — HOSPITAL ENCOUNTER (OUTPATIENT)
Dept: INTERVENTIONAL RADIOLOGY/VASCULAR | Facility: CLINIC | Age: 20
End: 2020-06-10
Attending: PEDIATRICS
Payer: COMMERCIAL

## 2020-06-10 ENCOUNTER — ALLIED HEALTH/NURSE VISIT (OUTPATIENT)
Dept: TRANSPLANT | Facility: CLINIC | Age: 20
End: 2020-06-10
Attending: PEDIATRICS
Payer: COMMERCIAL

## 2020-06-10 ENCOUNTER — HOSPITAL ENCOUNTER (OUTPATIENT)
Facility: CLINIC | Age: 20
Discharge: HOME | End: 2020-06-10
Attending: PEDIATRICS | Admitting: PEDIATRICS
Payer: COMMERCIAL

## 2020-06-10 ENCOUNTER — ANESTHESIA (OUTPATIENT)
Dept: PEDIATRICS | Facility: CLINIC | Age: 20
End: 2020-06-10
Payer: COMMERCIAL

## 2020-06-10 ENCOUNTER — ONCOLOGY VISIT (OUTPATIENT)
Dept: TRANSPLANT | Facility: CLINIC | Age: 20
End: 2020-06-10
Attending: NURSE PRACTITIONER
Payer: COMMERCIAL

## 2020-06-10 ENCOUNTER — TRANSFERRED RECORDS (OUTPATIENT)
Dept: HEALTH INFORMATION MANAGEMENT | Facility: CLINIC | Age: 20
End: 2020-06-10

## 2020-06-10 VITALS
HEART RATE: 72 BPM | WEIGHT: 130.95 LBS | SYSTOLIC BLOOD PRESSURE: 112 MMHG | TEMPERATURE: 98.6 F | RESPIRATION RATE: 16 BRPM | DIASTOLIC BLOOD PRESSURE: 70 MMHG | OXYGEN SATURATION: 99 % | HEIGHT: 69 IN | BODY MASS INDEX: 19.4 KG/M2

## 2020-06-10 VITALS
SYSTOLIC BLOOD PRESSURE: 84 MMHG | WEIGHT: 130.29 LBS | RESPIRATION RATE: 12 BRPM | BODY MASS INDEX: 19.1 KG/M2 | OXYGEN SATURATION: 98 % | HEART RATE: 59 BPM | TEMPERATURE: 97.4 F | DIASTOLIC BLOOD PRESSURE: 51 MMHG

## 2020-06-10 DIAGNOSIS — C91.02 ACUTE LYMPHOBLASTIC LEUKEMIA (ALL) IN RELAPSE (H): ICD-10-CM

## 2020-06-10 DIAGNOSIS — C91.00 ALL (ACUTE LYMPHOCYTIC LEUKEMIA) (H): ICD-10-CM

## 2020-06-10 DIAGNOSIS — C91.01 ACUTE LYMPHOBLASTIC LEUKEMIA (ALL) IN REMISSION (H): ICD-10-CM

## 2020-06-10 DIAGNOSIS — C91.02 ACUTE LYMPHOBLASTIC LEUKEMIA (ALL) IN RELAPSE (H): Primary | ICD-10-CM

## 2020-06-10 DIAGNOSIS — C91.01 ACUTE LYMPHOBLASTIC LEUKEMIA (ALL) IN REMISSION (H): Primary | ICD-10-CM

## 2020-06-10 DIAGNOSIS — Z94.81 S/P ALLOGENEIC BONE MARROW TRANSPLANT (H): ICD-10-CM

## 2020-06-10 LAB
ABO + RH BLD: NORMAL
ABO + RH BLD: NORMAL
ALBUMIN SERPL-MCNC: 4.4 G/DL (ref 3.4–5)
ALP SERPL-CCNC: 75 U/L (ref 40–150)
ALT SERPL W P-5'-P-CCNC: 58 U/L (ref 0–70)
ANION GAP SERPL CALCULATED.3IONS-SCNC: 10 MMOL/L (ref 3–14)
APTT PPP: 30 SEC (ref 22–37)
AST SERPL W P-5'-P-CCNC: 30 U/L (ref 0–45)
BASOPHILS # BLD AUTO: 0 10E9/L (ref 0–0.2)
BASOPHILS NFR BLD AUTO: 0 %
BILIRUB SERPL-MCNC: 0.7 MG/DL (ref 0.2–1.3)
BLASTS # BLD: 7.5 10E9/L
BLASTS BLD QL SMEAR: 59.3 %
BLD GP AB SCN SERPL QL: NORMAL
BLOOD BANK CMNT PATIENT-IMP: NORMAL
BUN SERPL-MCNC: 25 MG/DL (ref 7–30)
CALCIUM SERPL-MCNC: 9.6 MG/DL (ref 8.5–10.1)
CD19 CELLS # BLD: 486 CELLS/UL (ref 107–698)
CD19 CELLS NFR BLD: 32 % (ref 6–27)
CD3 CELLS # BLD: 961 CELLS/UL (ref 603–2990)
CD3 CELLS NFR BLD: 64 % (ref 49–84)
CD3+CD4+ CELLS # BLD: 525 CELLS/UL (ref 441–2156)
CD3+CD4+ CELLS NFR BLD: 35 % (ref 28–63)
CD3+CD4+ CELLS/CD3+CD8+ CLL BLD: 1.3 % (ref 1.4–2.6)
CD3+CD8+ CELLS # BLD: 414 CELLS/UL (ref 125–1312)
CD3+CD8+ CELLS NFR BLD: 27 % (ref 10–40)
CD3-CD16+CD56+ CELLS # BLD: 55 CELLS/UL (ref 95–640)
CD3-CD16+CD56+ CELLS NFR BLD: 4 % (ref 4–25)
CELL TRANSPLANT TYPE: NORMAL
CHLORIDE SERPL-SCNC: 107 MMOL/L (ref 94–109)
CO2 SERPL-SCNC: 22 MMOL/L (ref 20–32)
CREAT SERPL-MCNC: 0.94 MG/DL (ref 0.66–1.25)
DIFFERENTIAL METHOD BLD: ABNORMAL
EOSINOPHIL # BLD AUTO: 0.1 10E9/L (ref 0–0.7)
EOSINOPHIL NFR BLD AUTO: 0.9 %
ERYTHROCYTE [DISTWIDTH] IN BLOOD BY AUTOMATED COUNT: 14.1 % (ref 10–15)
GFR SERPL CREATININE-BSD FRML MDRD: >90 ML/MIN/{1.73_M2}
GLUCOSE CSF-MCNC: 53 MG/DL (ref 40–70)
GLUCOSE SERPL-MCNC: 94 MG/DL (ref 70–99)
HBV SURFACE AG SERPL QL IA: NONREACTIVE
HCT VFR BLD AUTO: 39.1 % (ref 40–53)
HCV AB SERPL QL IA: NONREACTIVE
HGB BLD-MCNC: 13.1 G/DL (ref 13.3–17.7)
HIV 1+2 AB+HIV1 P24 AG SERPL QL IA: NONREACTIVE
IFC SPECIMEN: ABNORMAL
INR PPP: 1.1 (ref 0.86–1.14)
LYMPHOCYTES # BLD AUTO: 4.6 10E9/L (ref 0.8–5.3)
LYMPHOCYTES NFR BLD AUTO: 36.3 %
MAGNESIUM SERPL-MCNC: 2.2 MG/DL (ref 1.6–2.3)
MCH RBC QN AUTO: 30.4 PG (ref 26.5–33)
MCHC RBC AUTO-ENTMCNC: 33.5 G/DL (ref 31.5–36.5)
MCV RBC AUTO: 91 FL (ref 78–100)
MONOCYTES # BLD AUTO: 0 10E9/L (ref 0–1.3)
MONOCYTES NFR BLD AUTO: 0 %
NEUTROPHILS # BLD AUTO: 0.4 10E9/L (ref 1.6–8.3)
NEUTROPHILS NFR BLD AUTO: 3.5 %
PHOSPHATE SERPL-MCNC: 5.2 MG/DL (ref 2.5–4.5)
PLATELET # BLD AUTO: 56 10E9/L (ref 150–450)
PLATELET # BLD EST: ABNORMAL 10*3/UL
POIKILOCYTOSIS BLD QL SMEAR: SLIGHT
POTASSIUM SERPL-SCNC: 5.1 MMOL/L (ref 3.4–5.3)
PROT CSF-MCNC: 29 MG/DL (ref 15–60)
PROT SERPL-MCNC: 8.3 G/DL (ref 6.8–8.8)
RBC # BLD AUTO: 4.31 10E12/L (ref 4.4–5.9)
SODIUM SERPL-SCNC: 139 MMOL/L (ref 133–144)
SPECIMEN EXP DATE BLD: NORMAL
WBC # BLD AUTO: 12.7 10E9/L (ref 4–11)

## 2020-06-10 PROCEDURE — 86803 HEPATITIS C AB TEST: CPT | Performed by: PEDIATRICS

## 2020-06-10 PROCEDURE — 82945 GLUCOSE OTHER FLUID: CPT | Performed by: PHYSICIAN ASSISTANT

## 2020-06-10 PROCEDURE — 86360 T CELL ABSOLUTE COUNT/RATIO: CPT | Performed by: PEDIATRICS

## 2020-06-10 PROCEDURE — 00000102 ZZHCL STATISTIC CYTO WRIGHT STAIN TC: Performed by: PHYSICIAN ASSISTANT

## 2020-06-10 PROCEDURE — 87521 HEPATITIS C PROBE&RVRS TRNSC: CPT | Performed by: PEDIATRICS

## 2020-06-10 PROCEDURE — 88264 CHROMOSOME ANALYSIS 20-25: CPT | Performed by: PHYSICIAN ASSISTANT

## 2020-06-10 PROCEDURE — 00000161 ZZHCL STATISTIC H-SPHEME PROCESS B/S: Performed by: PEDIATRICS

## 2020-06-10 PROCEDURE — 88108 CYTOPATH CONCENTRATE TECH: CPT | Mod: 26 | Performed by: PHYSICIAN ASSISTANT

## 2020-06-10 PROCEDURE — 86696 HERPES SIMPLEX TYPE 2 TEST: CPT | Performed by: PEDIATRICS

## 2020-06-10 PROCEDURE — 87389 HIV-1 AG W/HIV-1&-2 AB AG IA: CPT | Performed by: PEDIATRICS

## 2020-06-10 PROCEDURE — 38222 DX BONE MARROW BX & ASPIR: CPT | Performed by: PHYSICIAN ASSISTANT

## 2020-06-10 PROCEDURE — G0463 HOSPITAL OUTPT CLINIC VISIT: HCPCS | Mod: 25

## 2020-06-10 PROCEDURE — 87516 HEPATITIS B DNA AMP PROBE: CPT | Performed by: PEDIATRICS

## 2020-06-10 PROCEDURE — 86900 BLOOD TYPING SEROLOGIC ABO: CPT | Performed by: PEDIATRICS

## 2020-06-10 PROCEDURE — 81206 BCR/ABL1 GENE MAJOR BP: CPT | Performed by: PEDIATRICS

## 2020-06-10 PROCEDURE — 85730 THROMBOPLASTIN TIME PARTIAL: CPT | Performed by: PEDIATRICS

## 2020-06-10 PROCEDURE — 81267 CHIMERISM ANAL NO CELL SELEC: CPT | Performed by: PHYSICIAN ASSISTANT

## 2020-06-10 PROCEDURE — 25000128 H RX IP 250 OP 636: Performed by: REGISTERED NURSE

## 2020-06-10 PROCEDURE — 88184 FLOWCYTOMETRY/ TC 1 MARKER: CPT | Mod: 59 | Performed by: PHYSICIAN ASSISTANT

## 2020-06-10 PROCEDURE — 40000951 ZZHCL STATISTIC BONE MARROW INTERP TC 85097: Performed by: PEDIATRICS

## 2020-06-10 PROCEDURE — 27210134 ZZH KIT BIOPSY BONE MARROW: Performed by: PHYSICIAN ASSISTANT

## 2020-06-10 PROCEDURE — 40000165 ZZH STATISTIC POST-PROCEDURE RECOVERY CARE: Performed by: PHYSICIAN ASSISTANT

## 2020-06-10 PROCEDURE — 88271 CYTOGENETICS DNA PROBE: CPT | Performed by: PHYSICIAN ASSISTANT

## 2020-06-10 PROCEDURE — 86665 EPSTEIN-BARR CAPSID VCA: CPT | Performed by: PEDIATRICS

## 2020-06-10 PROCEDURE — 85025 COMPLETE CBC W/AUTO DIFF WBC: CPT | Performed by: PEDIATRICS

## 2020-06-10 PROCEDURE — 84100 ASSAY OF PHOSPHORUS: CPT | Performed by: PEDIATRICS

## 2020-06-10 PROCEDURE — 80053 COMPREHEN METABOLIC PANEL: CPT | Performed by: PEDIATRICS

## 2020-06-10 PROCEDURE — 86355 B CELLS TOTAL COUNT: CPT | Performed by: PEDIATRICS

## 2020-06-10 PROCEDURE — 87340 HEPATITIS B SURFACE AG IA: CPT | Performed by: PEDIATRICS

## 2020-06-10 PROCEDURE — 40000611 ZZHCL STATISTIC MORPHOLOGY W/INTERP HEMEPATH TC 85060: Performed by: PEDIATRICS

## 2020-06-10 PROCEDURE — 25000125 ZZHC RX 250: Performed by: PHYSICIAN ASSISTANT

## 2020-06-10 PROCEDURE — 37000009 ZZH ANESTHESIA TECHNICAL FEE, EACH ADDTL 15 MIN: Performed by: PHYSICIAN ASSISTANT

## 2020-06-10 PROCEDURE — 86901 BLOOD TYPING SEROLOGIC RH(D): CPT | Performed by: PEDIATRICS

## 2020-06-10 PROCEDURE — 88185 FLOWCYTOMETRY/TC ADD-ON: CPT | Performed by: PHYSICIAN ASSISTANT

## 2020-06-10 PROCEDURE — 25800030 ZZH RX IP 258 OP 636: Performed by: REGISTERED NURSE

## 2020-06-10 PROCEDURE — 88275 CYTOGENETICS 100-300: CPT | Performed by: PHYSICIAN ASSISTANT

## 2020-06-10 PROCEDURE — 86753 PROTOZOA ANTIBODY NOS: CPT | Performed by: PEDIATRICS

## 2020-06-10 PROCEDURE — 86644 CMV ANTIBODY: CPT | Performed by: PEDIATRICS

## 2020-06-10 PROCEDURE — 84157 ASSAY OF PROTEIN OTHER: CPT | Performed by: PHYSICIAN ASSISTANT

## 2020-06-10 PROCEDURE — 86704 HEP B CORE ANTIBODY TOTAL: CPT | Performed by: PEDIATRICS

## 2020-06-10 PROCEDURE — 89050 BODY FLUID CELL COUNT: CPT | Performed by: PHYSICIAN ASSISTANT

## 2020-06-10 PROCEDURE — 86357 NK CELLS TOTAL COUNT: CPT | Performed by: PEDIATRICS

## 2020-06-10 PROCEDURE — 83735 ASSAY OF MAGNESIUM: CPT | Performed by: PEDIATRICS

## 2020-06-10 PROCEDURE — 62270 DX LMBR SPI PNXR: CPT | Performed by: PHYSICIAN ASSISTANT

## 2020-06-10 PROCEDURE — 88280 CHROMOSOME KARYOTYPE STUDY: CPT | Performed by: PHYSICIAN ASSISTANT

## 2020-06-10 PROCEDURE — 86850 RBC ANTIBODY SCREEN: CPT | Performed by: PEDIATRICS

## 2020-06-10 PROCEDURE — 87798 DETECT AGENT NOS DNA AMP: CPT | Performed by: PEDIATRICS

## 2020-06-10 PROCEDURE — 36415 COLL VENOUS BLD VENIPUNCTURE: CPT | Performed by: PEDIATRICS

## 2020-06-10 PROCEDURE — 83735 ASSAY OF MAGNESIUM: CPT | Performed by: NURSE PRACTITIONER

## 2020-06-10 PROCEDURE — 87535 HIV-1 PROBE&REVERSE TRNSCRPJ: CPT | Performed by: PEDIATRICS

## 2020-06-10 PROCEDURE — 86359 T CELLS TOTAL COUNT: CPT | Performed by: PEDIATRICS

## 2020-06-10 PROCEDURE — 86703 HIV-1/HIV-2 1 RESULT ANTBDY: CPT | Performed by: PEDIATRICS

## 2020-06-10 PROCEDURE — 88311 DECALCIFY TISSUE: CPT | Performed by: PEDIATRICS

## 2020-06-10 PROCEDURE — 40001004 ZZHCL STATISTIC FLOW INT 9-15 ABY TC 88188: Performed by: PHYSICIAN ASSISTANT

## 2020-06-10 PROCEDURE — 85610 PROTHROMBIN TIME: CPT | Performed by: PEDIATRICS

## 2020-06-10 PROCEDURE — 37000008 ZZH ANESTHESIA TECHNICAL FEE, 1ST 30 MIN: Performed by: PHYSICIAN ASSISTANT

## 2020-06-10 PROCEDURE — C1752 CATH,HEMODIALYSIS,SHORT-TERM: HCPCS

## 2020-06-10 PROCEDURE — 88161 CYTOPATH SMEAR OTHER SOURCE: CPT | Performed by: PEDIATRICS

## 2020-06-10 PROCEDURE — C1788 PORT, INDWELLING, IMP: HCPCS

## 2020-06-10 PROCEDURE — 83021 HEMOGLOBIN CHROMOTOGRAPHY: CPT | Performed by: PEDIATRICS

## 2020-06-10 PROCEDURE — 27210902 ZZH KIT CR4

## 2020-06-10 PROCEDURE — 36561 INSERT TUNNELED CV CATH: CPT | Mod: LT

## 2020-06-10 PROCEDURE — 25000128 H RX IP 250 OP 636: Performed by: PHYSICIAN ASSISTANT

## 2020-06-10 PROCEDURE — 88305 TISSUE EXAM BY PATHOLOGIST: CPT | Performed by: PEDIATRICS

## 2020-06-10 PROCEDURE — 25000125 ZZHC RX 250: Performed by: NURSE PRACTITIONER

## 2020-06-10 PROCEDURE — 86687 HTLV-I ANTIBODY: CPT | Performed by: PEDIATRICS

## 2020-06-10 PROCEDURE — 88237 TISSUE CULTURE BONE MARROW: CPT | Performed by: PHYSICIAN ASSISTANT

## 2020-06-10 PROCEDURE — 81207 BCR/ABL1 GENE MINOR BP: CPT | Performed by: PEDIATRICS

## 2020-06-10 PROCEDURE — 25000125 ZZHC RX 250: Performed by: REGISTERED NURSE

## 2020-06-10 PROCEDURE — 86780 TREPONEMA PALLIDUM: CPT | Mod: 59 | Performed by: PEDIATRICS

## 2020-06-10 PROCEDURE — 40001011 ZZH STATISTIC PRE-PROCEDURE NURSING ASSESSMENT: Performed by: PHYSICIAN ASSISTANT

## 2020-06-10 PROCEDURE — C1769 GUIDE WIRE: HCPCS

## 2020-06-10 PROCEDURE — 86695 HERPES SIMPLEX TYPE 1 TEST: CPT | Performed by: PEDIATRICS

## 2020-06-10 PROCEDURE — 88108 CYTOPATH CONCENTRATE TECH: CPT | Performed by: PHYSICIAN ASSISTANT

## 2020-06-10 RX ORDER — EPHEDRINE SULFATE 50 MG/ML
INJECTION, SOLUTION INTRAMUSCULAR; INTRAVENOUS; SUBCUTANEOUS PRN
Status: DISCONTINUED | OUTPATIENT
Start: 2020-06-10 | End: 2020-06-10

## 2020-06-10 RX ORDER — SODIUM CHLORIDE, SODIUM LACTATE, POTASSIUM CHLORIDE, CALCIUM CHLORIDE 600; 310; 30; 20 MG/100ML; MG/100ML; MG/100ML; MG/100ML
INJECTION, SOLUTION INTRAVENOUS CONTINUOUS PRN
Status: DISCONTINUED | OUTPATIENT
Start: 2020-06-10 | End: 2020-06-10

## 2020-06-10 RX ORDER — ONDANSETRON 2 MG/ML
INJECTION INTRAMUSCULAR; INTRAVENOUS PRN
Status: DISCONTINUED | OUTPATIENT
Start: 2020-06-10 | End: 2020-06-10

## 2020-06-10 RX ORDER — HEPARIN SODIUM (PORCINE) LOCK FLUSH IV SOLN 100 UNIT/ML 100 UNIT/ML
5 SOLUTION INTRAVENOUS
Status: CANCELLED | OUTPATIENT
Start: 2020-06-10

## 2020-06-10 RX ORDER — LIDOCAINE HYDROCHLORIDE 20 MG/ML
INJECTION, SOLUTION INFILTRATION; PERINEURAL PRN
Status: DISCONTINUED | OUTPATIENT
Start: 2020-06-10 | End: 2020-06-10

## 2020-06-10 RX ORDER — ALBUTEROL SULFATE 0.83 MG/ML
2.5 SOLUTION RESPIRATORY (INHALATION)
Status: DISCONTINUED | OUTPATIENT
Start: 2020-06-10 | End: 2020-06-10 | Stop reason: HOSPADM

## 2020-06-10 RX ORDER — PROPOFOL 10 MG/ML
INJECTION, EMULSION INTRAVENOUS PRN
Status: DISCONTINUED | OUTPATIENT
Start: 2020-06-10 | End: 2020-06-10

## 2020-06-10 RX ORDER — CEFAZOLIN SODIUM 2 G/100ML
2 INJECTION, SOLUTION INTRAVENOUS
Status: DISCONTINUED | OUTPATIENT
Start: 2020-06-10 | End: 2020-06-10 | Stop reason: HOSPADM

## 2020-06-10 RX ORDER — HEPARIN SODIUM (PORCINE) LOCK FLUSH IV SOLN 100 UNIT/ML 100 UNIT/ML
3 SOLUTION INTRAVENOUS ONCE
Status: CANCELLED | OUTPATIENT
Start: 2020-06-10 | End: 2020-06-10

## 2020-06-10 RX ORDER — DEXTROSE MONOHYDRATE 25 G/50ML
25-50 INJECTION, SOLUTION INTRAVENOUS
Status: DISCONTINUED | OUTPATIENT
Start: 2020-06-10 | End: 2020-06-10 | Stop reason: HOSPADM

## 2020-06-10 RX ORDER — ONDANSETRON 2 MG/ML
4 INJECTION INTRAMUSCULAR; INTRAVENOUS EVERY 30 MIN PRN
Status: DISCONTINUED | OUTPATIENT
Start: 2020-06-10 | End: 2020-06-10 | Stop reason: HOSPADM

## 2020-06-10 RX ORDER — HEPARIN SODIUM (PORCINE) LOCK FLUSH IV SOLN 100 UNIT/ML 100 UNIT/ML
3 SOLUTION INTRAVENOUS EVERY 24 HOURS
Status: CANCELLED | OUTPATIENT
Start: 2020-06-10

## 2020-06-10 RX ORDER — HEPARIN SODIUM,PORCINE 10 UNIT/ML
5 VIAL (ML) INTRAVENOUS EVERY 24 HOURS
Status: CANCELLED | OUTPATIENT
Start: 2020-06-10

## 2020-06-10 RX ORDER — HYDROMORPHONE HYDROCHLORIDE 1 MG/ML
0.2 INJECTION, SOLUTION INTRAMUSCULAR; INTRAVENOUS; SUBCUTANEOUS EVERY 10 MIN PRN
Status: DISCONTINUED | OUTPATIENT
Start: 2020-06-10 | End: 2020-06-10 | Stop reason: HOSPADM

## 2020-06-10 RX ORDER — HEPARIN SODIUM (PORCINE) LOCK FLUSH IV SOLN 100 UNIT/ML 100 UNIT/ML
3 SOLUTION INTRAVENOUS
Status: CANCELLED | OUTPATIENT
Start: 2020-06-10

## 2020-06-10 RX ORDER — CEFAZOLIN SODIUM 2 G/100ML
INJECTION, SOLUTION INTRAVENOUS PRN
Status: DISCONTINUED | OUTPATIENT
Start: 2020-06-10 | End: 2020-06-10

## 2020-06-10 RX ORDER — NICOTINE POLACRILEX 4 MG
15-30 LOZENGE BUCCAL
Status: CANCELLED | OUTPATIENT
Start: 2020-06-10

## 2020-06-10 RX ORDER — HEPARIN SODIUM (PORCINE) LOCK FLUSH IV SOLN 100 UNIT/ML 100 UNIT/ML
5 SOLUTION INTRAVENOUS ONCE
Status: DISCONTINUED | OUTPATIENT
Start: 2020-06-10 | End: 2020-06-11 | Stop reason: HOSPADM

## 2020-06-10 RX ORDER — OXYCODONE HCL 5 MG/5 ML
0.1 SOLUTION, ORAL ORAL EVERY 4 HOURS PRN
Status: DISCONTINUED | OUTPATIENT
Start: 2020-06-10 | End: 2020-06-10 | Stop reason: HOSPADM

## 2020-06-10 RX ORDER — DEXTROSE MONOHYDRATE 25 G/50ML
25-50 INJECTION, SOLUTION INTRAVENOUS
Status: CANCELLED | OUTPATIENT
Start: 2020-06-10

## 2020-06-10 RX ORDER — HEPARIN SODIUM (PORCINE) LOCK FLUSH IV SOLN 100 UNIT/ML 100 UNIT/ML
5 SOLUTION INTRAVENOUS
Status: DISCONTINUED | OUTPATIENT
Start: 2020-06-10 | End: 2020-06-11 | Stop reason: HOSPADM

## 2020-06-10 RX ORDER — PROPOFOL 10 MG/ML
INJECTION, EMULSION INTRAVENOUS CONTINUOUS PRN
Status: DISCONTINUED | OUTPATIENT
Start: 2020-06-10 | End: 2020-06-10

## 2020-06-10 RX ORDER — NICOTINE POLACRILEX 4 MG
15-30 LOZENGE BUCCAL
Status: DISCONTINUED | OUTPATIENT
Start: 2020-06-10 | End: 2020-06-10 | Stop reason: HOSPADM

## 2020-06-10 RX ORDER — LIDOCAINE HYDROCHLORIDE 10 MG/ML
.5-1 INJECTION, SOLUTION INFILTRATION; PERINEURAL ONCE
Status: COMPLETED | OUTPATIENT
Start: 2020-06-10 | End: 2020-06-10

## 2020-06-10 RX ORDER — HEPARIN SODIUM (PORCINE) LOCK FLUSH IV SOLN 100 UNIT/ML 100 UNIT/ML
3 SOLUTION INTRAVENOUS ONCE
Status: COMPLETED | OUTPATIENT
Start: 2020-06-10 | End: 2020-06-10

## 2020-06-10 RX ORDER — FENTANYL CITRATE 50 UG/ML
25 INJECTION, SOLUTION INTRAMUSCULAR; INTRAVENOUS EVERY 10 MIN PRN
Status: DISCONTINUED | OUTPATIENT
Start: 2020-06-10 | End: 2020-06-10 | Stop reason: HOSPADM

## 2020-06-10 RX ORDER — LIDOCAINE HYDROCHLORIDE 10 MG/ML
4 INJECTION, SOLUTION EPIDURAL; INFILTRATION; INTRACAUDAL; PERINEURAL ONCE
Status: COMPLETED | OUTPATIENT
Start: 2020-06-10 | End: 2020-06-10

## 2020-06-10 RX ADMIN — ONDANSETRON 4 MG: 2 INJECTION INTRAMUSCULAR; INTRAVENOUS at 14:24

## 2020-06-10 RX ADMIN — PHENYLEPHRINE HYDROCHLORIDE 100 MCG: 10 INJECTION INTRAVENOUS at 12:50

## 2020-06-10 RX ADMIN — PHENYLEPHRINE HYDROCHLORIDE 150 MCG: 10 INJECTION INTRAVENOUS at 13:34

## 2020-06-10 RX ADMIN — LIDOCAINE HYDROCHLORIDE 40 MG: 20 INJECTION, SOLUTION INFILTRATION; PERINEURAL at 12:16

## 2020-06-10 RX ADMIN — LIDOCAINE HYDROCHLORIDE 10 ML: 10 INJECTION, SOLUTION EPIDURAL; INFILTRATION; INTRACAUDAL; PERINEURAL at 13:06

## 2020-06-10 RX ADMIN — HEPARIN 5 ML: 100 SYRINGE at 13:05

## 2020-06-10 RX ADMIN — PHENYLEPHRINE HYDROCHLORIDE 100 MCG: 10 INJECTION INTRAVENOUS at 12:59

## 2020-06-10 RX ADMIN — SODIUM CHLORIDE, POTASSIUM CHLORIDE, SODIUM LACTATE AND CALCIUM CHLORIDE: 600; 310; 30; 20 INJECTION, SOLUTION INTRAVENOUS at 12:15

## 2020-06-10 RX ADMIN — CEFAZOLIN 2 G: 10 INJECTION, POWDER, FOR SOLUTION INTRAVENOUS at 12:32

## 2020-06-10 RX ADMIN — PROPOFOL 300 MCG/KG/MIN: 10 INJECTION, EMULSION INTRAVENOUS at 12:15

## 2020-06-10 RX ADMIN — Medication 5 MG: at 13:40

## 2020-06-10 RX ADMIN — PHENYLEPHRINE HYDROCHLORIDE 100 MCG: 10 INJECTION INTRAVENOUS at 13:17

## 2020-06-10 RX ADMIN — PHENYLEPHRINE HYDROCHLORIDE 50 MCG: 10 INJECTION INTRAVENOUS at 12:47

## 2020-06-10 RX ADMIN — PHENYLEPHRINE HYDROCHLORIDE 150 MCG: 10 INJECTION INTRAVENOUS at 13:47

## 2020-06-10 RX ADMIN — PHENYLEPHRINE HYDROCHLORIDE 100 MCG: 10 INJECTION INTRAVENOUS at 13:40

## 2020-06-10 RX ADMIN — LIDOCAINE HYDROCHLORIDE 4 ML: 10 INJECTION, SOLUTION EPIDURAL; INFILTRATION; INTRACAUDAL; PERINEURAL at 13:16

## 2020-06-10 RX ADMIN — Medication 10 MG: at 13:47

## 2020-06-10 RX ADMIN — HEPARIN 3 ML: 100 SYRINGE at 13:18

## 2020-06-10 RX ADMIN — PROPOFOL 100 MG: 10 INJECTION, EMULSION INTRAVENOUS at 12:16

## 2020-06-10 ASSESSMENT — PAIN SCALES - GENERAL: PAINLEVEL: NO PAIN (0)

## 2020-06-10 ASSESSMENT — MIFFLIN-ST. JEOR: SCORE: 1593.38

## 2020-06-10 NOTE — NURSING NOTE
"Chief Complaint   Patient presents with     Consult     Patient here today for work up     /70 (BP Location: Right arm, Patient Position: Sitting, Cuff Size: Adult Regular)   Pulse 72   Temp 98.6  F (37  C) (Oral)   Resp 16   Ht 1.759 m (5' 9.25\")   Wt 59.4 kg (130 lb 15.3 oz)   SpO2 99%   BMI 19.20 kg/m      No Pain (0)  Data Unavailable    I have reviewed the patients medication and allergy list.    Patient needs refills: no    Dressing change needed? No    EKG needed? Yes    Zhane Frost, [facility]  Pratibha 10, 2020  "

## 2020-06-10 NOTE — CONSULTS
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  The plan is to collect for 1 to 3 days or until the target goal is met.   A central line will be placed and will be used for access for the procedure.          Chief Complaint:   Transfusion medicine consultation.         History of Present Illness:   19 year old male presents for consultation for autologous  MNC  collection.  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  He is currently feeling well.  The patient denies any back pain that would prevent him from tolerating the procedure.   No significant travel history (spent a week in [place] in September 2019).  The patient has no identifiable risk factors for infectious disease.  The procedure, risks/benefits were discussed with the patient and all of his questions were addressed at this time.             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)      History of blood transfusion      WPW (Aaron-Parkinson-White syndrome) 03/2018             Past Surgical History:     Past Surgical History:   Procedure Laterality Date     BONE MARROW BIOPSY, BONE SPECIMEN, NEEDLE/TROCAR Right 11/27/2018    Procedure: bone marrow biopsy;  Surgeon: Jacqueline Fitzgerald NP;  [facility]      BONE MARROW BIOPSY, BONE SPECIMEN, NEEDLE/TROCAR N/A 2/8/2019    Procedure: BIOPSY BONE MARROW;  Surgeon: Lisa Workman NP;  [facility]      BONE MARROW BIOPSY, BONE SPECIMEN, NEEDLE/TROCAR N/A 5/16/2019    Procedure: BIOPSY, BONE MARROW;  Surgeon: Lilia López APRN CNP;  [facility]     BONE MARROW BIOPSY, BONE SPECIMEN, NEEDLE/TROCAR N/A 11/7/2019    Procedure: Bone marrow biopsy;  Surgeon: Jacqueline Shin NP;  [facility]      ESOPHAGOSCOPY, GASTROSCOPY, DUODENOSCOPY (EGD), COMBINED N/A 2/22/2019    Procedure: Upper endoscopy with biopsy;  Surgeon: Magdalene Hobson MD;  [facility]      INSERT CATHETER VASCULAR ACCESS DOUBLE LUMEN CHILD N/A 10/26/2018    Procedure: double lumen tunneled line placement;  Surgeon: Rex Valente MD;  [facility]      IR CHEST PORT PLACEMENT > 5 YRS OF AGE  6/10/2020     IR CVC NON TUNNEL PLACEMENT  6/10/2020     IR CVC TUNNEL REMOVAL LEFT  2/8/2019     IR PORT REMOVAL LEFT  5/16/2019     O CLAVICLE LEFT Left     fracture with surgical repair and plate/screws     ORTHOPEDIC SURGERY      femur     REMOVE CATHETER VASCULAR ACCESS N/A 2/8/2019    Procedure: Tunneled line removal;  Surgeon: Krystal Esparza MD;  [facility]      REMOVE PORT VASCULAR ACCESS N/A 5/16/2019    Procedure: REMOVAL, VASCULAR ACCESS PORT;  Surgeon: Link Rios PA-C;  [facility]              Social History:   Lives in [place]         Allergies:     Allergies   Allergen Reactions     Blood Transfusion Related (Informational Only) Other (See Comments)     Patient has a history of a clinically significant antibody against RBC antigens.  A delay in compatible RBCs may occur.Stem cell transplant patient.  Give type O RBCs.     No Known Drug Allergies              Medications:     Current Outpatient Medications   Medication Sig     cephALEXin (KEFLEX) 500 MG capsule Take 1 capsule (500 mg) by mouth 3 times daily (Patient not taking: Reported on 6/5/2020)     metroNIDAZOLE (METROCREAM) 0.75 % external cream Apply topically 2 times daily (Patient not taking: Reported on 6/5/2020)     mupirocin (BACTROBAN) 2 % external ointment Use 2 times a day to affected area. (Patient not taking: Reported on  6/5/2020)     pantoprazole (PROTONIX) 40 MG EC tablet Take 1 tablet (40 mg) by mouth 2 times daily     sulfamethoxazole-trimethoprim (BACTRIM DS/SEPTRA DS) 800-160 MG per tablet Take 1 tablet by mouth Every Mon, Tues two times daily (Patient not taking: Reported on 11/7/2019)     tacrolimus (PROTOPIC) 0.1 % external ointment Apply topically 2 times daily (Patient not taking: Reported on 9/27/2019)     tretinoin (RETIN-A) 0.025 % external cream Use every night (Patient not taking: Reported on 11/7/2019)     No current facility-administered medications for this encounter.      Facility-Administered Medications Ordered in Other Encounters   Medication     heparin 100 UNIT/ML injection 5 mL     heparin 100 UNIT/ML injection 5 mL             Review of Systems:     CONSTITUTIONAL:  negative for  fevers and chills  HEENT:  negative for  sore throat  RESPIRATORY:  negative for  dyspnea and cough  CARDIOVASCULAR:  negative for  chest pain, palpitations  GASTROINTESTINAL:  negative for nausea, vomiting and diarrhea  HEMATOLOGIC/LYMPHATIC:  positive for multiple transfusions related to the time around prior BMT  negative for bleeding  NEUROLOGICAL:  negative for seizures, numbness and tingling           Vital Signs:   Vitals reviewed  T 97.5  R 20  HR 76  /83            Data:     BMP  Recent Labs   Lab 06/10/20  0814 06/05/20  0859    139   POTASSIUM 5.1 4.0   CHLORIDE 107 109   ELSA 9.6 8.8   CO2 22 22   BUN 25 14   CR 0.94 0.74   GLC 94 123*     CBC  Recent Labs   Lab 06/10/20  0814 06/05/20  0859   WBC 12.7* 7.3   RBC 4.31* 1.67*   HGB 13.1* 5.3*   HCT 39.1* 16.1*   MCV 91 96   MCH 30.4 31.7   MCHC 33.5 32.9   RDW 14.1 16.7*   PLT 56* 11*     INR  Recent Labs   Lab 06/10/20  0814   INR 1.10       ABO   Date Value Ref Range Status   06/10/2020 A  Final     RH(D)   Date Value Ref Range Status   06/10/2020 Neg  Final     Antibody Screen   Date Value Ref Range Status   06/10/2020 Neg  Final     Blood Bank Comment    Date Value Ref Range Status   12/07/2018   Final    Delay in availability of Red Blood Cells called to  Aida Lafleur in peds infusion @ 1430 12/07/18 by          Results for orders placed or performed during the hospital encounter of 06/10/20 (from the past 24 hour(s))   IR CVC Non Tunnel Placement Large Bore Double Lumen NON TUNNELED    Narrative    Procedure date: 6/10/2020.    History: Patient with history of acute lymphoblastic leukemia, here  for placement of non-tunneled central venous catheter for apheresis.  Nontunneled catheter placement was performed immediately after right  IJ single lumen chest port placement.    Procedure: Image guided placement of left IJ, 11.5 Hungarian, 20 cm  non-tunneled central venous Medcomp catheter.    Preop diagnosis: Acute lymphoblastic leukemia.    Postop diagnosis: Same.    : Faizan Rios PA-C    Assist: Miranda Henderson RPA.    Medications: IV medications for sedation were administered by gastric  sedation staff. 1% lidocaine 2 ml local anesthesia, heparin 1.5 ml per  port, 100 units/ml.    Face-to-face sedation time: Per pediatric sedation staff.    Nursing: Throughout the procedure the patient's vital signs and oxygen  saturations were continuously monitored by pediatric sedation staff  under the supervision of the attending physician, and remained stable.    Fluoroscopy time: 0.1 minutes.    IV contrast: None.    Consent: The procedure, as well as risks and benefits, was discussed  in detail with the patient. Informed written and verbal consent  obtained.    Procedure/Findings: The left neck was prepped and draped in the usual  sterile fashion. Under ultrasound guidance a patent left internal  jugular vein was identified, an image was saved, and the overlying  area was anesthetized with 2 ml 1% lidocaine with 8.4% sodium  bicarbonate. Under ultrasound guidance, a 0.018 gauge micropuncture  needle was advanced into the left internal jugular vein.  Under  fluoroscopic visualization, a 0.018 soft tipped wire was advanced into  the right atrium, the needle was removed, and a 3-5 Hungarian dilator was  advanced over the wire into the superior vena cava.  The wire was  drawn back to the right atrium and a measurement was taken.  The wire  and inner 3 Hungarian dilator were removed. Under fluoroscopic guidance,  a 0.035 Bentson wire was advanced through the outer 5 Hungarian dilator  into the inferior vena cava, and secured. An 11.5 Hungarian, 20 cm,  double lumen, non-tunneled central venous Medcomp catheter was  selected and flushed with normal saline. The 5 Hungarian dilator was  removed, and the subcutaneous tissues were dilated over wire with a 10  Hungarian dilator. The 10 Hungarian dilator was removed and the 11.5 Hungarian,  20 cm, dual-lumen nontunneled Medcomp catheter was then advanced over  the wire into the left internal jugular vein, and under fluoroscopic  visualization, it's tip was placed in the right atrium. Stiffener and  wire were removed. The catheter was secured to the neck with a single,  2-0 Ethilon suture and the site was cleansed and dressed. The catheter  flushed and aspirated freely with normal saline and each port was  flushed with 1.5 ml of heparin 100 units/ml. The procedure was well  tolerated, with no immediate complications.    Estimate blood loss: 3 cc.    Specimens: None.      Impression    Impression: Image-guided placement of left IJ, 11.5 Hungarian, 20 cm, non  tunneled central venous Medcomp catheter. Catheter ready for immediate  use.    Plan: Follow-up per primary team. Daily dressing changes per floor  routine.    Attestation: The physician assistant (PA) who performed this procedure  and signed the above report is licensed to practice in the state [facility] pursuant to [place] Statute 147A.09.  This includes meeting the  Statute and [place] Board of Medical [employer] requirement of an  active Delegation Agreement, which documents delegation of  services by  primary and alternate supervising physicians. All services rendered  are performed under a collaborative agreement with Dr. Mary Grace Ornelas, Director of Interventional Radiology, [facility].     CEE BELCHER MD  Transfusion Medicine Attending  Laboratory Medicine and Pathology  Pager [phone]

## 2020-06-10 NOTE — PROCEDURES
BMT Bone Marrow Biopsy and Lumbar Puncture Procedure Note  Pratibha 10, 2020 2:43 PM    DIAGNOSIS: ALL     PROCEDURE: Unilateral Bone Marrow Biopsy and Unilateral Aspirate and Lumbar Puncture    SITE: Pediatric Sedation Suite    Patient s identification was positively verified by verbal identification and invasive procedure safety checklist was completed.  Informed consent was obtained. Following the administration of propofol as sedation, the patient was placed in the left lateral decubitus position and prepped and draped in a sterile manner.  Approximately 1 cc of 1% Lidocaine was used over the right posterior iliac spine.  Following this a 3 mm incision was made. A trephine bone marrow core was obtained from the [facility]. Bone marrow aspirates were obtained from the [facility]. Aspirates were sent for morphology, immunophenotyping, cytogenetics, molecular diagnostics, BCR-ABL and research studies.  A total of approximately 20 ml of marrow was aspirated. Following this procedure, attention was then focused on the lumbar puncture. Anatomic landmarks were identified by palpation. The L4-5 disc space remained prepped and draped in a sterile fashion. A 22 gauge, 3.5 inch needle was placed on the first attempt.  4 mL spinal fluid collected. Specimen appears clear and colorless. Specimen sent for cell count and differential, cytology, protein, glucose and flow cytometry. The needle was removed and a bandage was applied to the site. The patient was placed in the supine position to maintain pressure on the biopsy site. Post-procedure wound care instructions were given. The patient tolerated the procedure well with no known discomfort.      Complications: Difficult bone marrow aspirate and oozing at the aspirate site. Oozing controlled at the time of discharge.      Recent Labs   Lab Test 06/10/20  0814   PLT 56*       Procedure performed by:  Gabriella YOUSIF-PC  [facility]  Pediatric Blood and  Marrow Transplant    Supervised by:  Candido Han PA-C  Pediatric Blood and Marrow Transplant Program  [facility]

## 2020-06-10 NOTE — ANESTHESIA PREPROCEDURE EVALUATION
Anesthesia Pre-Procedure Evaluation    Patient: Artemio Nino   MRN:     [id number] Gender:   male   Age:    19 year old :      2000        Preoperative Diagnosis: ALL (acute lymphoblastic leukemia) (H) [C91.00]   Procedure(s):  Large Bore Double Lumen NON TUNNELED Apheresis Catheter placement  Port placement  BIOPSY, BONE MARROW  LUMBAR PUNCTURE, DIAGNOSTIC     LABS:  CBC:   Lab Results   Component Value Date    WBC 7.3 2020    WBC 4.8 2019    HGB 5.3 (LL) 2020    HGB 13.2 (L) 2019    HCT 16.1 (L) 2020    HCT 39.7 (L) 2019    PLT 11 (LL) 2020     2019     BMP:   Lab Results   Component Value Date     2020     2019    POTASSIUM 4.0 2020    POTASSIUM 4.0 2019    CHLORIDE 109 2020    CHLORIDE 108 2019    CO2 22 2020    CO2 27 2019    BUN 14 2020    BUN 17 2019    CR 0.74 2020    CR 0.94 2019     (H) 2020    GLC 84 2019     COAGS:   Lab Results   Component Value Date    PTT 34 2019    INR 1.12 2019    FIBR 384 2018     POC: No results found for: BGM, HCG, HCGS  OTHER:   Lab Results   Component Value Date    ELSA 8.8 2020    PHOS 2.9 2020    MAG 2.1 2020    ALBUMIN 3.9 2020    PROTTOTAL 6.9 2020    ALT 31 2020    AST 17 2020    ALKPHOS 50 2020    BILITOTAL 0.6 2020    TSH 4.20 (H) 2019    T4 0.88 2019        Preop Vitals    BP Readings from Last 3 Encounters:   20 106/63   19 114/76   19 99/68    Pulse Readings from Last 3 Encounters:   20 86   19 73   19 50      Resp Readings from Last 3 Encounters:   20 20   19 14   19 20    SpO2 Readings from Last 3 Encounters:   20 100%   19 99%   19 99%      Temp Readings from Last 1 Encounters:   20 36.7  C (98.1  F) (Oral)    Ht Readings from Last 1  "Encounters:   06/05/20 1.754 m (5' 9.06\")      Wt Readings from Last 1 Encounters:   06/05/20 60.7 kg (133 lb 13.1 oz)    Estimated body mass index is 19.73 kg/m  as calculated from the following:    Height as of 6/5/20: 1.754 m (5' 9.06\").    Weight as of 6/5/20: 60.7 kg (133 lb 13.1 oz).     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)      History of blood transfusion      WPW (Aaron-Parkinson-White syndrome) 03/2018      Past Surgical History:   Procedure Laterality Date     BONE MARROW BIOPSY, BONE SPECIMEN, NEEDLE/TROCAR Right 11/27/2018    Procedure: bone marrow biopsy;  Surgeon: Jacqueline Fitzgerald NP;  [facility]      BONE MARROW BIOPSY, BONE SPECIMEN, NEEDLE/TROCAR N/A 2/8/2019    Procedure: BIOPSY BONE MARROW;  Surgeon: Lisa Workman NP;  [facility]      BONE MARROW BIOPSY, BONE SPECIMEN, NEEDLE/TROCAR N/A 5/16/2019    Procedure: BIOPSY, BONE MARROW;  Surgeon: Lilia López APRN CNP;  [facility]     BONE MARROW BIOPSY, BONE SPECIMEN, NEEDLE/TROCAR N/A 11/7/2019    Procedure: Bone marrow biopsy;  Surgeon: Jacqueline Shin NP;  [facility]      ESOPHAGOSCOPY, GASTROSCOPY, DUODENOSCOPY (EGD), COMBINED N/A 2/22/2019    Procedure: Upper endoscopy with biopsy;  Surgeon: Magdalene Hobson MD;  [facility]      INSERT CATHETER VASCULAR ACCESS DOUBLE LUMEN CHILD N/A 10/26/2018    Procedure: double lumen tunneled line placement;  Surgeon: Rex Valente MD;  [facility]      IR CVC TUNNEL REMOVAL LEFT  2/8/2019     IR PORT REMOVAL LEFT  5/16/2019     O CLAVICLE LEFT Left     fracture with surgical repair and plate/screws     ORTHOPEDIC SURGERY      femur     REMOVE CATHETER VASCULAR ACCESS N/A 2/8/2019    Procedure: Tunneled line removal;  Surgeon: Krystal Esparza MD;  [facility]      REMOVE PORT VASCULAR ACCESS N/A 5/16/2019    Procedure: " REMOVAL, VASCULAR ACCESS PORT;  Surgeon: Link Rios PA-C;  [facility]      Allergies   Allergen Reactions     Blood Transfusion Related (Informational Only) Other (See Comments)     Patient has a history of a clinically significant antibody against RBC antigens.  A delay in compatible RBCs may occur.Stem cell transplant patient.  Give type O RBCs.     No Known Drug Allergies         Anesthesia Evaluation     . Pt has had prior anesthetic.     No history of anesthetic complications          ROS/MED HX    ENT/Pulmonary:  - neg pulmonary ROS    (-) asthma and Other pulmonary disease   Neurologic:  - neg neurologic ROS     Cardiovascular: Comment: WPW 2/18 - one episode of syncope--patient denies was due to WPW    ziopath 10/18:  Cannot rule out pre-excitation at higher HR.    (+) ----. : . . . :. . Previous cardiac testing Echodate:9/19results:Normal echocardiogram. There is normal appearance and motion of the tricuspid,  mitral, pulmonary and aortic valves. No atrial, ventricular or arterial level  shunting. The calculated biplane left ventricular ejection fraction is 65-70%.  Normal right and left ventricular size and function.date: results: date: results: date: results:          METS/Exercise Tolerance:  >4 METS   Hematologic:     (+) Anemia, History of Transfusion Other Hematologic Disorder (thrombocytopenia)-pancytopenia      Musculoskeletal:  - neg musculoskeletal ROS       GI/Hepatic:     (+) GERD Asymptomatic on medication,       Renal/Genitourinary:  - ROS Renal section negative       Endo:  - neg endo ROS       Psychiatric:  - neg psychiatric ROS       Infectious Disease:  - neg infectious disease ROS       Malignancy:   (+) Malignancy History of Lymphoma/Leukemia  Lymph CA Active status post, 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 Rate: Normal     Resp. Effort: Normal      CV: Rhythm: Regular  Rate: Age appropriate  Heart: Normal Sounds  Edema: None   Comments:      Dental: Normal Dentition                Assessment:   ASA SCORE: 3    H&P: History and physical reviewed and following examination; no interval change.   Smoking Status:  Non-Smoker/Unknown   NPO Status: NPO Appropriate     Plan:   Anes. Type:  General   Pre-Medication: None   Induction:  IV (Standard)   Airway: Native Airway   Access/Monitoring: PIV   Maintenance: Propofol Sedation     Postop Plan:   Postop Pain: None  Postop Sedation/Airway: Not planned  Disposition: Outpatient     PONV Management:   Adult Risk Factors:, Non-Smoker   Prevention:, Propofol     CONSENT: Direct conversation   Plan and risks discussed with: Patient   Blood Products: Consent Deferred (Minimal Blood Loss)       Comments for Plan/Consent:  Discussed risks of anesthesia including nausea, vomiting, sore throat, dental damage, cardiopulmonary complications, agitation, neurologic complications, and serious complications.                   Yesenia Wallace MD

## 2020-06-10 NOTE — DISCHARGE INSTRUCTIONS
[facility]   [phone]    Care post Lumbar Puncture     Do not remove bandage/dressing for 24 hours -- after this time they can be removed    No bath, shower or soaking of the dressing for 24 hours    Activity as tolerated by the patient    Diet as able to tolerated    May use Tylenol as needed for pain control -- DO NOT use Ibuprofen    Can apply icepack to the site for discomfort -- no more than 10 minutes at a time    If bleeding presents apply pressure for 5 minutes    Call [phone] ask for Peds BMT/Hem/Onc fellow on call if complications arise including:    persistent bleeding    fever greater than 100.5    Pain    Lumbar punctures can cause headache. If the pain is not controlled with Tylenol (acetaminophen) please call the Peds BMT/Hem/Onc fellow on call      [facility]  [phone]    Care for Bone Marrow Biopsy    Do not remove bandage/dressing for 24 hours -- after this time they can be removed. If Steri-strips are presents they can stay on until they fall off    No bath, shower or soaking of the dressing for 24 hours    Activity as tolerated by the patient    Diet as able to tolerate    May use Tylenol as needed for pain control    Can apply icepack to the site for discomfort -- no more than 10 minutes at a time    If bleeding presents, apply pressure for 5 minutes    Call [phone] ask for Peds BMT/Hem/Onc fellow on call if complications arise including:     persistent bleeding    fever greater than 100.5    pain     [facility]  Pediatric Interventional Radiology  Discharge Instructions for Implanted Port Placement    Date of Procedure: 6/10/2020     Today you had an Implanted Port Placed by Link Rios PA-C      Activity    No strenuous activity for 1 week    No heavy lifting (greater than 10 pounds) for 3 days    No contact sports for 2 weeks    No tub bath, hot tub, or swimming until Dermabond (skin glue) is no longer there  (about 7-10 days)    Diet    Resume your regular diet    Discomfort    Pain medications as directed    Site Care    Your site(s) has been closed with {site closed with:[id number]}      If there is any oozing or bleeding from the site, apply direct pressure for 5-10 minutes with a gauze pad.  If bleeding continues after 10 minutes, call Pediatric Interventional Radiology.  If bleeding cannot be controlled with direct pressure, call 911.      It is OK to shower and get the incision wet, but immediately pat it dry       ***     If sedation was given:    DO NOT drive or operate heavy machinery for 24 hours    DO NOT drink alcoholic beverages for 24 hours    DO NOT make important legal decisions for 24 hours     You must have a responsible adult to drive you home and stay with you for 24 hours    Call your Doctor if:    Bleeding    Swelling in your neck or arm    Fever greater than 100.5 degrees F (oral)    Other signs of infection such as redness, tenderness, or drainage from the wound    If you have any questions or concerns about this procedure:  Pediatric Interventional Radiology [phone] Mon-Fri, 7am to 5pm    [phone] After-hours, weekends, holidays  Ask for the Pediatric Interventional Radiologist on-call   [facility]  Pediatric Interventional Radiology  Discharge Instructions for Tunneled Central Venous Catheter Placement    Date of Procedure: 6/10/2020      Today you had a Tunneled Central Venous Catheter Placed by Link Rios PA-C      Activity    No strenuous activity for 1 week    No heavy lifting (greater than 10 pounds) for 3 days    No contact sports for 2 weeks    No swimming, tub bath, or hot tub while Tunneled Central Venous Catheter is in place    Diet    Resume your regular diet    Discomfort    Pain medications as directed    Site Care    Your site(s) has been closed with Dermabond (upper neck site), and dressed with sterile dressing  (catheter insertion site).  Keep the catheter insertion site clean, dry, and covered.  Only change the dressing if it becomes wet or dirty.       If there is any oozing or bleeding from the site, apply direct pressure for 5-10 minutes with a gauze pad.  If bleeding continues after 10 minutes, call Pediatric Interventional Radiology.  If bleeding cannot be controlled with direct pressure, call 911.      Cover insertion site dressing with a folded dry washcloth, cover with plastic wrap, and secure by Microfoam tape.  After your shower, remove the covering.  Leave the insertion site dressing intact.    If sedation was given:    DO NOT drive or operate heavy machinery for 24 hours    DO NOT drink alcoholic beverages for 24 hours    DO NOT make important legal decisions for 24 hours     You must have a responsible adult to drive you home and stay with you for 24 hours    Call your Doctor if:    Bleeding    Swelling in your neck or arm    Fever greater than 100.5 degrees F (oral)    Other signs of infection such as redness, tenderness, or drainage from the wound    If you have any questions or concerns about this procedure:  Pediatric Interventional Radiology [phone] Mon-Fri, 7am to 5pm    [phone] After-hours, weekends, holidays  Ask for the Pediatric Interventional Radiologist on-call

## 2020-06-10 NOTE — PROGRESS NOTES
06/10/20 1530   Incision/Surgical Site 06/10/20 Right Hip   Date First Assessed/Time First Assessed: 06/10/20 1421   Orientation: Right  Location: Hip   Incision Assessment Drainage   Incision Drainage Amount Moderate   Drainage Description Sanguinous   Dressing Intervention Dressing marked   Candido Han notified of continued drainage on bone marrow dressing.  In to assess site.  Dressing changed with sm amount of cont drainage.  Pressure held again and drainage seemed to stop. New drsg placed.  Will continue to monitor.

## 2020-06-10 NOTE — ANESTHESIA CARE TRANSFER NOTE
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    Anesthesia Type:   General     Note:  Airway :Nasal Cannula  Patient transferred to: Recovery  Comments: VSS.  Sleeping and comfortable.  Spontaneous respirations.  Satisfactory anesthetic recovery.Handoff Report: Identifed the Patient, Identified the Reponsible Provider, Reviewed the pertinent medical history, Discussed the surgical course, Reviewed Intra-OP anesthesia mangement and issues during anesthesia, Set expectations for post-procedure period and Allowed opportunity for questions and acknowledgement of understanding      Vitals: (Last set prior to Anesthesia Care Transfer)    CRNA VITALS  6/10/2020 1256 - 6/10/2020 1356      6/10/2020             Pulse:  52    Ht Rate:  51    Temp 2:  35.8  C (96.4  F)    SpO2:  99 %    Resp Rate (observed):  24      CRNA VITALS  6/10/2020 1357 - 6/10/2020 1432      6/10/2020             Resp Rate (observed):  (!) 1                Electronically Signed By: SNOW Chang CRNA  Pratibha 10, 2020  2:32 PM

## 2020-06-10 NOTE — PROCEDURES
[facility], [place]    Procedure: IR Procedure Note    Date/Time: 6/10/2020 1:29 PM  Performed by: Link Rios PA-C  Authorized by: Link Rios PA-C   IR Fellow Physician:  Other(s) attending procedure: Assist: Miranda Henderson RPA    UNIVERSAL PROTOCOL   Site Marked: NA  Prior Images Obtained and Reviewed:  Yes  Required items: Required blood products, implants, devices and special equipment available    Patient identity confirmed:  Verbally with patient, arm band, provided demographic data and hospital-assigned identification number  Patient was reevaluated immediately before administering moderate or deep sedation or anesthesia  Confirmation Checklist:  Patient's identity using two indicators, relevant allergies, procedure was appropriate and matched the consent or emergent situation and correct equipment/implants were available  Time out: Immediately prior to the procedure a time out was called    Universal Protocol: the Joint Commission Universal Protocol was followed    Preparation: Patient was prepped and draped in usual sterile fashion    ESBL (mL):  5         ANESTHESIA    Anesthesia: Local infiltration  Local Anesthetic:  Lidocaine 1% without epinephrine  Anesthetic Total (mL):  15      SEDATION    Patient Sedated: Yes    Sedation Type:  Deep  Vital signs: Vital signs monitored during sedation    See dictated procedure note for full details.  Findings: Image guided placement of right internal jugular, 6 Fr., 22.5 cm single lumen central venous chest port. Port ready for use.    Image guided placement of left internal jugular, 12 Fr., 20 cm., dual lumen MedComp non-tunneled central venous catheter. Catheter ready for use.    Specimens: none    Complications: None    Condition: Stable    Plan: Follow up per primary team. Return to IR for port removal when indicated.    PROCEDURE   Patient Tolerance:  Patient tolerated the procedure well with no immediate  complications  Describe Procedure: Fluoroscopy time: Less than 1 minute.   Time of sedation: per Pediatric sedation team.  Length of time physician/provider present for 1:1 monitoring during sedation: 0

## 2020-06-10 NOTE — OR NURSING
Call made to BMT clinic and spoke with Lisa Workman to verify that IR could proceed with the apheresis catheter placement prior to collecting a CD-3 level.  She verified that she could proceed with the procedure.

## 2020-06-10 NOTE — PROGRESS NOTES
BMT COVID Screening/Education, NT and Teaching Flowsheet    Met with patient and patient's father, Friday, June 5th, offering anticipatory guidance concerning today's suspected relapse and upcoming apheresis appointments    Following an in-depth discussion about patient's medical history and current status, disease process, risks and benefits for transplant versus CAR-T (Kymirah) therapy related to relapsed b-cell ALL, participated in consultation addressing pre- and post-Kymriah processes, proposed protocol including non-myeloablative conditioning regimen (cytoxan and fludarabine), supportive therapies, medications, outpatient consideration, available resources, and any other pertinent questions/concerns referencing personal notes/illustrations and [facility] material    Additional time was spent discussing laboratory tests/results (i.e., ALC, CD3, and TNC counts), temporary apheresis line placement, outpatient leukapheresis, and pRBC/platelet transfusion(s) as needed    Telephoned patient Tuesday, June 9th to perform pre-visit COVID screening, asking the following questions:   Are you or anyone else living in the home sick, or have been recently sick? No  Have you or anyone else you've been in close contact with, traveled to high risk areas (per CDC guidelines or been on a cruise within the last 14 days? No  Have you or anyone else you've been in close contact with exposed to COVID-19? No     Reiterated the importance of calling the [facility] during business hours at [phone] or paging the BMT fellow on-call afterhours at [phone], if Artemio begins to experience, fever, cough, or shortness of breath; moreover, avoid all non essential travel at this time, wear masks when out in public, utilize handwashing and  when indicated, avoid crowded places, follow CDC advice https://www.cdc.gov/coronavirus/2019-ncov/index.html and travel guidelines  https://www.cdc.gov/coronavirus/2019-ncov/travelers/index.html     Apheresis workup calendar reviewed discussing times and locations of appointments including the rationale behind each test/procedure/consultation, clarifying patient would be contacted with any changes and to disregard instructions to contact the clinic each morning of workup    Patient confirmed that he'd undergo asymptomatic COVID testing same-day, June 9th in [facility] prior to Wednesday's sedation     Met with patient and patient's father, Demian, Wednesday, June 10th to discuss pre- and post-Kymriah (CAR-T) processes focusing on outpatient apheresis, calendar, medications, temporary apheresis line and port-a-cath, diagnostic BMBX/LP, supportive care, available resources, and any other pertinent questions/concerns referencing manufacture handouts/educational materials; anticipatory guidance, clarification, and additional information provided according to mother's needs and requests    Plan to continue care coordination with referring provider, discussing timing for subsequent chemotherapy and confirmation that patient's MRD will need to be less than 50% and CD19>50 to proceed with Kymriah per Dr. Zavala    Reassured patient I would touch base with him once cell manufacturing began, estimating returning for workup in 3-4 weeks    Questions concerning logistics were deferred to social work  The following emails were sent to care team:  From: Daria Jara   Sent: Wednesday, Pratibha 10, 2020 7:40 AM  To: Lisa Workman; Lilia López; Candido Han  Cc: Camryn Mao; Cheng Ramos; Reba Puentes  Subject: FW: outpatient apheresis patient     Jaime Gonzalez (and team) - cc ing Reba in case she gets any afterhours calls,    Artemio is undergoing outpatient apheresis workup today with labs (currently has not CVL) followed by H&P (COVID testing was complete yesterday), donor/apheresis consultation in PEDS SED, temporary apheresis line  placement assuming ALC>500 and/or CD3>150, port-a-cath placement, and diagnostic BMBX/LP per [facility] request (referring oncologist is Manjinder Paniagua).  He ll remain outpatient post-procedures with plans to return to the [facility] for apheresis Thursday and possibly Friday, aiming to collect >2 x 10^9 TNC and/or >1 x 10^9 CD3 (absolute, not weight based) per [id number] [facility] (CAR-T).  Line removal is scheduled for Friday afternoon.      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  He received transfusion at [facility] on 6/5 ad well as 6/8 à if transfusions are needed [facility].  We ll plan to transfuse pRBC with goal to keep hgb 9-10 per apheresis  request and plt. >50 for line placements and LP.    Patient has a blended family, preferring dad (Demian) to be first contact followed by mom (Mervat); he currently lives with his grandparents with a steady girlfriend who will assume some of the caregiving responsibilities s/p CAR-T    Please let me know what questions or concerns you have or if there s anything that I can help with - I ll be upstairs in the clinic workroom today starting around 8.     CLAYTON CastilloN, RN, CPHON, BMTCN  Pediatric Blood and Marrow Transplant Nurse Coordinator  Office: [phone]  Fax: [phone]  Pager: [phone]    From: Daria Jara   Sent: Wednesday, Pratibha 10, 2020 7:22 AM  To: ANUPAMA Oneill  Cc: Reba Puentes; Camryn Mao; Cheng Ramos; Lisa Workman; Saritha May; MD Viky Zavala; [email or web address]  Subject: outpatient apheresis patient     Good morning,   Please page the BMT fellow on-call, Dr. Reba Puentes at [phone], Thursday afternoon/evening, with the following patient s apheresis  totals:  Artemio Nino  MRN: [id number]  : 2000    Patient will be undergoing apheresis in the [facility] tomorrow, Thursday,  and possibly Friday,  if needed, aiming to collect >2 x 10^9 TNC and/or >1 x 10^9 CD3 (absolute, not weight based) per  Kymriah (CAR-T) under Dr. Viky Jara, CLAYTONN, RN, CPHON, BMTCN  Pediatric Blood and Marrow Transplant Nurse Coordinator  [facility]   Office: [phone]  Fax: [phone]  Pager: [phone]

## 2020-06-11 ENCOUNTER — TRANSFERRED RECORDS (OUTPATIENT)
Dept: TRANSPLANT | Facility: CLINIC | Age: 20
End: 2020-06-11

## 2020-06-11 ENCOUNTER — CARE COORDINATION (OUTPATIENT)
Dept: TRANSPLANT | Facility: CLINIC | Age: 20
End: 2020-06-11

## 2020-06-11 ENCOUNTER — INFUSION THERAPY VISIT (OUTPATIENT)
Dept: INFUSION THERAPY | Facility: CLINIC | Age: 20
End: 2020-06-11
Attending: PEDIATRICS
Payer: COMMERCIAL

## 2020-06-11 ENCOUNTER — HOSPITAL ENCOUNTER (OUTPATIENT)
Dept: LAB | Facility: CLINIC | Age: 20
End: 2020-06-11
Attending: NURSE PRACTITIONER
Payer: COMMERCIAL

## 2020-06-11 VITALS
RESPIRATION RATE: 18 BRPM | DIASTOLIC BLOOD PRESSURE: 72 MMHG | WEIGHT: 132.06 LBS | OXYGEN SATURATION: 99 % | SYSTOLIC BLOOD PRESSURE: 108 MMHG | HEIGHT: 69 IN | BODY MASS INDEX: 19.56 KG/M2 | TEMPERATURE: 98.2 F

## 2020-06-11 VITALS
TEMPERATURE: 98.8 F | HEART RATE: 64 BPM | RESPIRATION RATE: 16 BRPM | DIASTOLIC BLOOD PRESSURE: 59 MMHG | BODY MASS INDEX: 19.56 KG/M2 | WEIGHT: 132.06 LBS | SYSTOLIC BLOOD PRESSURE: 111 MMHG | HEIGHT: 69 IN

## 2020-06-11 DIAGNOSIS — C91.02 ALL (ACUTE LYMPHOID LEUKEMIA) IN RELAPSE (H): Primary | ICD-10-CM

## 2020-06-11 DIAGNOSIS — C91.01 ACUTE LYMPHOBLASTIC LEUKEMIA (ALL) IN REMISSION (H): Primary | ICD-10-CM

## 2020-06-11 LAB
BASOPHILS # BLD AUTO: 0.1 10E9/L (ref 0–0.2)
BASOPHILS NFR BLD AUTO: 0.9 %
BLASTS # BLD: 5.9 10E9/L
BLASTS BLD QL SMEAR: 56.9 %
CMV IGG SERPL QL IA: 3.3 AI (ref 0–0.8)
COPATH REPORT: NORMAL
DIFFERENTIAL METHOD BLD: ABNORMAL
EBV VCA IGG SER QL IA: >8 AI (ref 0–0.8)
EOSINOPHIL # BLD AUTO: 0 10E9/L (ref 0–0.7)
EOSINOPHIL NFR BLD AUTO: 0 %
ERYTHROCYTE [DISTWIDTH] IN BLOOD BY AUTOMATED COUNT: 14.1 % (ref 10–15)
HCT VFR BLD AUTO: 32 % (ref 40–53)
HGB BLD-MCNC: 10.9 G/DL (ref 13.3–17.7)
HSV1 IGG SERPL QL IA: 0.3 AI (ref 0–0.8)
HSV2 IGG SERPL QL IA: <0.2 AI (ref 0–0.8)
LAB SCANNED RESULT: NORMAL
LDH SERPL L TO P-CCNC: 146 U/L (ref 85–227)
LYMPHOCYTES # BLD AUTO: 3.8 10E9/L (ref 0.8–5.3)
LYMPHOCYTES NFR BLD AUTO: 36.2 %
MCH RBC QN AUTO: 30.4 PG (ref 26.5–33)
MCHC RBC AUTO-ENTMCNC: 34.1 G/DL (ref 31.5–36.5)
MCV RBC AUTO: 89 FL (ref 78–100)
MISCELLANEOUS TEST: NORMAL
MONOCYTES # BLD AUTO: 0.2 10E9/L (ref 0–1.3)
MONOCYTES NFR BLD AUTO: 1.7 %
NEUTROPHILS # BLD AUTO: 0.4 10E9/L (ref 1.6–8.3)
NEUTROPHILS NFR BLD AUTO: 4.3 %
PHOSPHATE SERPL-MCNC: 3.6 MG/DL (ref 2.5–4.5)
PLATELET # BLD AUTO: 36 10E9/L (ref 150–450)
PLATELET # BLD EST: ABNORMAL 10*3/UL
RBC # BLD AUTO: 3.59 10E12/L (ref 4.4–5.9)
RBC MORPH BLD: NORMAL
URATE SERPL-MCNC: 4.7 MG/DL (ref 3.5–7.2)
WBC # BLD AUTO: 10.4 10E9/L (ref 4–11)

## 2020-06-11 PROCEDURE — 0537T: CPT

## 2020-06-11 PROCEDURE — 25000125 ZZHC RX 250: Performed by: STUDENT IN AN ORGANIZED HEALTH CARE EDUCATION/TRAINING PROGRAM

## 2020-06-11 PROCEDURE — 38207 CRYOPRESERVE STEM CELLS: CPT | Performed by: NURSE PRACTITIONER

## 2020-06-11 PROCEDURE — 84550 ASSAY OF BLOOD/URIC ACID: CPT | Performed by: NURSE PRACTITIONER

## 2020-06-11 PROCEDURE — 85025 COMPLETE CBC W/AUTO DIFF WBC: CPT | Performed by: NURSE PRACTITIONER

## 2020-06-11 PROCEDURE — 25000128 H RX IP 250 OP 636: Performed by: STUDENT IN AN ORGANIZED HEALTH CARE EDUCATION/TRAINING PROGRAM

## 2020-06-11 PROCEDURE — 85025 COMPLETE CBC W/AUTO DIFF WBC: CPT | Performed by: STUDENT IN AN ORGANIZED HEALTH CARE EDUCATION/TRAINING PROGRAM

## 2020-06-11 PROCEDURE — 84100 ASSAY OF PHOSPHORUS: CPT | Performed by: NURSE PRACTITIONER

## 2020-06-11 PROCEDURE — 38214 VOLUME DEPLETE OF HARVEST: CPT | Performed by: NURSE PRACTITIONER

## 2020-06-11 PROCEDURE — 83615 LACTATE (LD) (LDH) ENZYME: CPT | Performed by: NURSE PRACTITIONER

## 2020-06-11 RX ORDER — HEPARIN SODIUM (PORCINE) LOCK FLUSH IV SOLN 100 UNIT/ML 100 UNIT/ML
3 SOLUTION INTRAVENOUS ONCE
Status: COMPLETED | OUTPATIENT
Start: 2020-06-11 | End: 2020-06-11

## 2020-06-11 RX ORDER — DEXTROSE MONOHYDRATE 25 G/50ML
25-50 INJECTION, SOLUTION INTRAVENOUS
Status: CANCELLED | OUTPATIENT
Start: 2020-06-11

## 2020-06-11 RX ORDER — NICOTINE POLACRILEX 4 MG
15-30 LOZENGE BUCCAL
Status: CANCELLED | OUTPATIENT
Start: 2020-06-11

## 2020-06-11 RX ADMIN — HEPARIN 3 ML: 100 SYRINGE at 14:14

## 2020-06-11 RX ADMIN — ANTICOAGULANT CITRATE DEXTROSE SOLUTION FORMULA A 1509 ML: 12.25; 11; 3.65 SOLUTION INTRAVENOUS at 08:40

## 2020-06-11 RX ADMIN — CALCIUM GLUCONATE 2200 MG/HR: 98 INJECTION, SOLUTION INTRAVENOUS at 08:40

## 2020-06-11 ASSESSMENT — MIFFLIN-ST. JEOR
SCORE: 1587.76
SCORE: 1587.76

## 2020-06-11 NOTE — PROCEDURES
Transfusion Medicine  Apheresis Procedure Note    Artemio Nino [id number]   YOB: 2000 Age: 19 year old        Reason for consult: Autologous Mononuclear Cell (MNC)  Collection           Assessment and Plan:   19 year old male undergoes autologous MNC collection for CART cell therapy (Kymriah) for relapsed ALL.  The plan is to collect for 1 to 3 days or until the target goal is met.   A central line was placed and used for access for the procedure.     Today is our first day of collections.  He tolerated the procedure well without complication.             History of Present Illness:   19 year old male presents for autologous  MNC  collection.  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  He is currently feeling well.               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)      History of blood transfusion      WPW (Aaron-Parkinson-White syndrome) 03/2018             Past Surgical History:     Past Surgical History:   Procedure Laterality Date     BONE MARROW BIOPSY, BONE SPECIMEN, NEEDLE/TROCAR Right 11/27/2018    Procedure: bone marrow biopsy;  Surgeon: Jacqueline Fitzgerald NP;  Location: [facility] PEDS SEDATION      BONE MARROW BIOPSY, BONE SPECIMEN, NEEDLE/TROCAR N/A 2/8/2019    Procedure: BIOPSY BONE MARROW;  Surgeon: Lisa Workman NP;  [facility]: [facility] PED[facility] SED[facility]      BONE MARROW BIOPSY, BONE SPECIMEN, NEEDLE/TROCAR N/A 5/16/2019    Procedure: BIOPSY, BONE MARROW;  Surgeon: Lilia López APRN CNP;  Location: [facility] OR     BONE MARROW BIOPSY, BONE SPECIMEN, NEEDLE/TROCAR N/A 11/7/2019    Procedure: Bone marrow biopsy;  Surgeon: Jacqueline Shin NP;  [facility]: [facility] SED[facility]      BONE MARROW BIOPSY, BONE SPECIMEN, NEEDLE/TROCAR Right 6/10/2020    Procedure: BIOPSY, BONE MARROW;  Surgeon: Candido Han PA-C;  [facility]: [facility]      ESOPHAGOSCOPY, GASTROSCOPY,  DUODENOSCOPY (EGD), COMBINED N/A 2/22/2019    Procedure: Upper endoscopy with biopsy;  Surgeon: Magdalene Hobson MD;  [facility]: [facility]      INSERT CATHETER VASCULAR ACCESS APHERESIS CHILD N/A 6/10/2020    Procedure: Large Bore Double Lumen NON TUNNELED Apheresis Catheter placement;  Surgeon: Link Rios PA-C;  [facility]      INSERT CATHETER VASCULAR ACCESS DOUBLE LUMEN CHILD N/A 10/26/2018    Procedure: double lumen tunneled line placement;  Surgeon: Rex Valente MD;  [facility]: [facility]      INSERT PORT VASCULAR ACCESS N/A 6/10/2020    Procedure: Port placement;  Surgeon: Link Rios PA-C;  [facility]: [facility]      IR CHEST PORT PLACEMENT > 5 YRS OF AGE  6/10/2020     IR CVC NON TUNNEL PLACEMENT  6/10/2020     IR CVC TUNNEL REMOVAL LEFT  2/8/2019     IR PORT REMOVAL LEFT  5/16/2019     O CLAVICLE LEFT Left     fracture with surgical repair and plate/screws     ORTHOPEDIC SURGERY      femur     REMOVE CATHETER VASCULAR ACCESS N/A 2/8/2019    Procedure: Tunneled line removal;  Surgeon: Krystal Esparza MD;  [facility]      REMOVE PORT VASCULAR ACCESS N/A 5/16/2019    Procedure: REMOVAL, VASCULAR ACCESS PORT;  Surgeon: Link Rios PA-C;  [facility] OR              Social History:   Lives in [place]         Allergies:     Allergies   Allergen Reactions     Blood Transfusion Related (Informational Only) Other (See Comments)     Patient has a history of a clinically significant antibody against RBC antigens.  A delay in compatible RBCs may occur.Stem cell transplant patient.  Give type O RBCs.     No Known Drug Allergies              Medications:     Current Outpatient Medications   Medication Sig     cephALEXin (KEFLEX) 500 MG capsule Take 1 capsule (500 mg) by mouth 3 times daily (Patient not taking: Reported on 6/5/2020)     metroNIDAZOLE (METROCREAM) 0.75 % external cream Apply topically 2 times daily (Patient  "not taking: Reported on 6/5/2020)     mupirocin (BACTROBAN) 2 % external ointment Use 2 times a day to affected area. (Patient not taking: Reported on 6/5/2020)     pantoprazole (PROTONIX) 40 MG EC tablet Take 1 tablet (40 mg) by mouth 2 times daily     sulfamethoxazole-trimethoprim (BACTRIM DS/SEPTRA DS) 800-160 MG per tablet Take 1 tablet by mouth Every Mon, Tues two times daily (Patient not taking: Reported on 11/7/2019)     tacrolimus (PROTOPIC) 0.1 % external ointment Apply topically 2 times daily (Patient not taking: Reported on 9/27/2019)     tretinoin (RETIN-A) 0.025 % external cream Use every night (Patient not taking: Reported on 11/7/2019)     Current Facility-Administered Medications   Medication     sodium chloride 0.9 % bag TABLE SOLN             Review of Systems:     See above           Vital Signs:   /59   Pulse 64   Temp 98.8  F (37.1  C) (Oral)   Resp 16   Ht 1.742 m (5' 8.58\")   Wt 59.9 kg (132 lb 0.9 oz)   BMI 19.74 kg/m                Data:     BMP  Recent Labs   Lab 06/10/20  0814 06/05/20  0859    139   POTASSIUM 5.1 4.0   CHLORIDE 107 109   ELSA 9.6 8.8   CO2 22 22   BUN 25 14   CR 0.94 0.74   GLC 94 123*     CBC  Recent Labs   Lab 06/11/20  0840 06/10/20  0814 06/05/20  0859   WBC 10.4 12.7* 7.3   RBC 3.59* 4.31* 1.67*   HGB 10.9* 13.1* 5.3*   HCT 32.0* 39.1* 16.1*   MCV 89 91 96   MCH 30.4 30.4 31.7   MCHC 34.1 33.5 32.9   RDW 14.1 14.1 16.7*   PLT 36* 56* 11*     INR  Recent Labs   Lab 06/10/20  0814   INR 1.10       ABO   Date Value Ref Range Status   06/10/2020 A  Final     RH(D)   Date Value Ref Range Status   06/10/2020 Neg  Final     Antibody Screen   Date Value Ref Range Status   06/10/2020 Neg  Final     Blood Bank Comment   Date Value Ref Range Status   12/07/2018   Final    Delay in availability of Red Blood Cells called to  Aida Lafleur in peds infusion @ 1430 12/07/18 by          Results for orders placed or performed during the hospital encounter of " 06/11/20 (from the past 24 hour(s))   CBC with platelets differential   Result Value Ref Range    WBC 10.4 4.0 - 11.0 10e9/L    RBC Count 3.59 (L) 4.4 - 5.9 10e12/L    Hemoglobin 10.9 (L) 13.3 - 17.7 g/dL    Hematocrit 32.0 (L) 40.0 - 53.0 %    MCV 89 78 - 100 fl    MCH 30.4 26.5 - 33.0 pg    MCHC 34.1 31.5 - 36.5 g/dL    RDW 14.1 10.0 - 15.0 %    Platelet Count 36 (LL) 150 - 450 10e9/L    Diff Method Manual Differential     % Neutrophils 4.3 %    % Lymphocytes 36.2 %    % Monocytes 1.7 %    % Eosinophils 0.0 %    % Basophils 0.9 %    % Blasts 56.9 %    Absolute Neutrophil 0.4 (LL) 1.6 - 8.3 10e9/L    Absolute Lymphocytes 3.8 0.8 - 5.3 10e9/L    Absolute Monocytes 0.2 0.0 - 1.3 10e9/L    Absolute Eosinophils 0.0 0.0 - 0.7 10e9/L    Absolute Basophils 0.1 0.0 - 0.2 10e9/L    Absolute Blasts 5.9 (H) 0 10e9/L    RBC Morphology Normal     Platelet Estimate Decreased                   Procedure Summary:   Mononuclear cell collection was performed on the Amicus device. A dual lumen central line was used for access and allowed for appropriate flow during the procedure.  ACD-A was used for anticoagulation. Calcium gluconate was given in the return line to offset the effects of the citrate.   The patient's vital signs were stable throughout.  The patient tolerated the procedure well without complication.  See apheresis flowsheet for additional details.        Attestation: I, Pato Quintanilla MD, was immediately available onsite throughout the duration of the apheresis procedure, and I was in direct communication with the apheresis staff regarding the patient's procedure and care plan.  Due to COVID concerns and efforts to conserve PPE, I did not enter the patient's room.      Pato Quintanilla MD  Transfusion Medicine Attending  Laboratory Medicine & Pathology  Pager: [phone]

## 2020-06-11 NOTE — PROGRESS NOTES
Infusion Nursing Note    Artemio Nino Presents to [facility] today for:apheresis    Due to : ALL (acute lymphoid leukemia) in relapse (H)    All cares completed by apheresis nurse.

## 2020-06-11 NOTE — DISCHARGE INSTRUCTIONS
Apheresis Blood Donor Center Post Instructions  You may feel tired after your procedure today.   Please call your doctor if you have:  bleeding that doesn t stop, fever, pain where a needle or tube (catheter) was placed, seizures, trouble breathing, red urine, nausea or vomiting, other health concerns.     If your symptoms are severe, call [phone].  If you have a Central Venous Catheter:  Notify your doctor if you have had a fever, chills, shaking  or redness, warmth, swelling, drainage at the exit-site.  This could be a sign of infection.    The Apheresis/Blood Donor Center is open Monday-Friday 7:30 a.m. to 5 p.m.  The phone number is [phone].  A Transfusion Medicine physician can be reached after 5:00 p.m. weekdays and on weekends /Holidays by calling [phone], and asking for the physician on call.      Autologous HPC/MNC Collection:   In most cases, the cell dose report will be available tomorrow morning.   The Bone Marrow Transplant (BMT) clinic staff looks at your report, and a decision is made if you will need another collection.   Remember it is important to follow a low fat diet during the collection process. Sometimes following the procedure, your blood platelet count may be low.  If you are told your platelet count is low, you need to avoid taking aspirin/aspirin containing products and avoid heavy physical activity and activities that may result in bruising or traumatic injury.  To contact the BMT fellow or attending physician after 5 p.m. call [phone].

## 2020-06-12 ENCOUNTER — INFUSION THERAPY VISIT (OUTPATIENT)
Dept: INFUSION THERAPY | Facility: CLINIC | Age: 20
End: 2020-06-12
Attending: PEDIATRICS
Payer: COMMERCIAL

## 2020-06-12 ENCOUNTER — HOSPITAL ENCOUNTER (OUTPATIENT)
Dept: INTERVENTIONAL RADIOLOGY/VASCULAR | Facility: CLINIC | Age: 20
End: 2020-06-12
Attending: PEDIATRICS
Payer: COMMERCIAL

## 2020-06-12 VITALS
TEMPERATURE: 98.2 F | HEART RATE: 73 BPM | SYSTOLIC BLOOD PRESSURE: 101 MMHG | OXYGEN SATURATION: 98 % | RESPIRATION RATE: 18 BRPM | HEIGHT: 69 IN | DIASTOLIC BLOOD PRESSURE: 65 MMHG | BODY MASS INDEX: 19.05 KG/M2 | WEIGHT: 128.6 LBS

## 2020-06-12 DIAGNOSIS — Z94.81 S/P ALLOGENEIC BONE MARROW TRANSPLANT (H): ICD-10-CM

## 2020-06-12 DIAGNOSIS — C91.00 ALL (ACUTE LYMPHOCYTIC LEUKEMIA) (H): ICD-10-CM

## 2020-06-12 DIAGNOSIS — C91.00 ALL (ACUTE LYMPHOBLASTIC LEUKEMIA OF INFANT) (H): Primary | ICD-10-CM

## 2020-06-12 LAB
APPEARANCE CSF: CLEAR
BLD PROD TYP BPU: NORMAL
BLD PROD TYP BPU: NORMAL
BLD UNIT ID BPU: 0
BLOOD PRODUCT CODE: NORMAL
BPU ID: NORMAL
COLOR CSF: COLORLESS
COPATH REPORT: NORMAL
COPATH REPORT: NORMAL
DONOR CYTOMEGALOVIRUS ABY: POSITIVE
DONOR HEP B CORE ABY: NONREACTIVE
DONOR HEP B SURF AGN: NONREACTIVE
DONOR HEPATITIS C ABY: NONREACTIVE
DONOR HTLV 1&2 ANTIBODY: NONREACTIVE
DONOR TREPONEMA PAL ABY: NONREACTIVE
HIV1+2 AB SERPL QL IA: NONREACTIVE
MPX SERIES: NONREACTIVE
NUM BPU REQUESTED: 1
RBC # CSF MANUAL: 0 /UL (ref 0–2)
T CRUZI AB SER DONR QL: NONREACTIVE
TRANSFUSION STATUS PATIENT QL: NORMAL
TRANSFUSION STATUS PATIENT QL: NORMAL
TUBE # CSF: 1 #
WBC # CSF MANUAL: 0 /UL (ref 0–5)
WNV RNA SPEC QL NAA+PROBE: NONREACTIVE

## 2020-06-12 PROCEDURE — 25000128 H RX IP 250 OP 636: Mod: ZF

## 2020-06-12 PROCEDURE — P9037 PLATE PHERES LEUKOREDU IRRAD: HCPCS | Performed by: NURSE PRACTITIONER

## 2020-06-12 PROCEDURE — G0463 HOSPITAL OUTPT CLINIC VISIT: HCPCS | Mod: 25

## 2020-06-12 PROCEDURE — 36430 TRANSFUSION BLD/BLD COMPNT: CPT

## 2020-06-12 RX ORDER — DEXTROSE MONOHYDRATE 25 G/50ML
25-50 INJECTION, SOLUTION INTRAVENOUS
Status: CANCELLED | OUTPATIENT
Start: 2020-06-12

## 2020-06-12 RX ORDER — HEPARIN SODIUM,PORCINE 10 UNIT/ML
2-4 VIAL (ML) INTRAVENOUS EVERY 24 HOURS
Status: DISCONTINUED | OUTPATIENT
Start: 2020-06-12 | End: 2020-06-12 | Stop reason: HOSPADM

## 2020-06-12 RX ORDER — NICOTINE POLACRILEX 4 MG
15-30 LOZENGE BUCCAL
Status: CANCELLED | OUTPATIENT
Start: 2020-06-12

## 2020-06-12 RX ORDER — HEPARIN SODIUM,PORCINE 10 UNIT/ML
VIAL (ML) INTRAVENOUS
Status: COMPLETED
Start: 2020-06-12 | End: 2020-06-12

## 2020-06-12 RX ADMIN — HEPARIN, PORCINE (PF) 10 UNIT/ML INTRAVENOUS SYRINGE 4 ML: at 13:25

## 2020-06-12 RX ADMIN — Medication 4 ML: at 13:25

## 2020-06-12 ASSESSMENT — PAIN SCALES - GENERAL: PAINLEVEL: SEVERE PAIN (6)

## 2020-06-12 ASSESSMENT — MIFFLIN-ST. JEOR: SCORE: 1571.45

## 2020-06-12 NOTE — PROGRESS NOTES
Infusion Nursing Note    Artemio Nino Presents to [facility] today for: Platelets    Due to :    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)  S/P allogeneic bone marrow transplant (H)    Intravenous Access/Labs: Labs drawn from Apheresis cath.    Infusion Note: Pt. States feeling fine upon arrival with exception of baseline headache.  Plt: 27 today.  1 dose of platelets given over 1 hour without issue for planned line removal this afternoon.  VSS throughout.  Pt. Seen by Dr. Sally MD while in clinic.     Discharge Plan:   Patient and father verbalized understanding of discharge instructions, left clinic for IR to have line removed.  Pt left [facility] in stable condition.

## 2020-06-13 LAB — INTERPRETATION ECG - MUSE: NORMAL

## 2020-06-18 LAB — COPATH REPORT: NORMAL

## 2020-06-19 ENCOUNTER — APPOINTMENT (OUTPATIENT)
Dept: TRANSPLANT | Facility: CLINIC | Age: 20
End: 2020-06-19
Attending: PEDIATRICS
Payer: COMMERCIAL

## 2020-06-20 LAB — COPATH REPORT: NORMAL

## 2020-07-01 ENCOUNTER — APPOINTMENT (OUTPATIENT)
Dept: LAB | Facility: CLINIC | Age: 20
End: 2020-07-01
Attending: PEDIATRICS
Payer: COMMERCIAL

## 2020-07-01 ENCOUNTER — CARE COORDINATION (OUTPATIENT)
Dept: TRANSPLANT | Facility: CLINIC | Age: 20
End: 2020-07-01

## 2020-07-01 DIAGNOSIS — C91.00 ALL (ACUTE LYMPHOCYTIC LEUKEMIA) (H): Primary | ICD-10-CM

## 2020-07-02 LAB — COPATH REPORT: NORMAL

## 2020-07-06 ENCOUNTER — HOSPITAL ENCOUNTER (OUTPATIENT)
Facility: CLINIC | Age: 20
End: 2020-07-06
Payer: COMMERCIAL

## 2020-07-06 DIAGNOSIS — Z11.59 ENCOUNTER FOR SCREENING FOR OTHER VIRAL DISEASES: Primary | ICD-10-CM

## 2020-07-07 ENCOUNTER — TRANSFERRED RECORDS (OUTPATIENT)
Dept: HEALTH INFORMATION MANAGEMENT | Facility: CLINIC | Age: 20
End: 2020-07-07

## 2020-07-10 ENCOUNTER — TRANSFERRED RECORDS (OUTPATIENT)
Dept: HEALTH INFORMATION MANAGEMENT | Facility: CLINIC | Age: 20
End: 2020-07-10

## 2020-07-14 ENCOUNTER — MEDICAL CORRESPONDENCE (OUTPATIENT)
Dept: TRANSPLANT | Facility: CLINIC | Age: 20
End: 2020-07-14

## 2020-07-14 ENCOUNTER — TELEPHONE (OUTPATIENT)
Dept: PEDIATRIC HEMATOLOGY/ONCOLOGY | Facility: CLINIC | Age: 20
End: 2020-07-14

## 2020-07-14 DIAGNOSIS — C91.02 ACUTE LYMPHOBLASTIC LEUKEMIA (ALL) IN RELAPSE (H): Primary | ICD-10-CM

## 2020-07-14 RX ORDER — TRAMADOL HYDROCHLORIDE 50 MG/1
50 TABLET ORAL EVERY 6 HOURS PRN
Status: ON HOLD | COMMUNITY
End: 2020-07-24

## 2020-07-14 RX ORDER — CAFFEINE 200 MG
200 TABLET ORAL EVERY 4 HOURS PRN
COMMUNITY
End: 2020-07-16

## 2020-07-14 RX ORDER — MERCAPTOPURINE 50 MG/1
100-150 TABLET ORAL DAILY
COMMUNITY
End: 2020-07-16

## 2020-07-14 RX ORDER — DIPHENHYDRAMINE HCL 50 MG
50 CAPSULE ORAL EVERY 6 HOURS PRN
Status: ON HOLD | COMMUNITY
End: 2020-07-17

## 2020-07-14 RX ORDER — GABAPENTIN 300 MG/1
300 CAPSULE ORAL 3 TIMES DAILY
COMMUNITY
Start: 2020-07-14 | End: 2020-07-16

## 2020-07-14 NOTE — PROGRESS NOTES
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  Tisagenlecleucel is a CA09-bkniyera genetically-modified autologous T cell immunotherapy. Artemio will receive a preparative regimen according to protocol 2017-45 (Flu/Cy).     Allergies/Adverse Reactions to Medications/Food/Other Agents & Medication to Avoid:   Requires Benadryl premeds for platelets due to history of hives with infusion  History of phototoxic eruption/significant rash with voriconazole - do not rechallenge     Current Medications Include:   Medication Sig   calcium carbonate (TUMS) 500 MG chewable tablet Take 1-2 chew tab by mouth daily as needed for heartburn   diphenhydrAMINE (BENADRYL) 50 MG capsule Take 50 mg by mouth every 6 hours as needed for sleep or nausea   melatonin 5 MG tablet Take 5-10 mg by mouth nightly as needed for sleep   sulfamethoxazole-trimethoprim (BACTRIM DS/SEPTRA DS) 800-160 MG per tablet Take 1 tablet by mouth Every Mon, Tues two times daily   Missed dosing on 7/13 & 14 - instructed to take on Saturday and Sunday (7/18 & 19) prior to initiating chemo, plan to resume again on day +28 (8/24/2020)   traMADol (ULTRAM) 50 MG tablet Take 50 mg by mouth every 6 hours as needed for pain (mild or anticipated)   oxyCODONE (ROXICODONE) 5 MG tablet Take 1 tablet (5 mg) by mouth every 6 hours as needed for severe pain (Cancer related pain)   pantoprazole (PROTONIX) 40 MG EC tablet Take 1 tablet (40 mg) by mouth daily        I instructed Artemio to continue all medications, with the above instructions for Bactrim.  Of note - his oral chemotherapeutic agents (6-MP, methotrexate, and ruxolitinib) were discontinued on 7/12 by his primary oncologist in preparation for [facility].     Patient Preference for Medications:    Artemio prefers that medication comes as pills     Herbal Medication/Nutritional Supplements:   I discussed the importance of avoiding the use of herbal products during the transplant and post transplant period while on immunosuppressants, and risk of potential drug/herb interactions.     Chemotherapy:   We reviewed the chemotherapy and immunetherapy that he will receive:   1. Fludarabine 30 mg/m2 IV daily x 4 days  2. Cyclophosphamide 500 mg/m2 IV daily x 2 days (starting with the first dose of fludarabine)  3. Tisagenlecleucel (Kymriah) - premed with acetaminophen and bendaryl.     DO NOT GIVE STEROIDS UNLESS LIFE THREATENING EMERGENCY.     Infuse Tisagenlecleucel (Kymriah) 2 to 14 days after completion of the lymphodepleting chemotherapy.    According to package insert, the infusion of tisagenlecleucel (Kymriah) should be delayed if a patient has unresolved serious adverse reactions (including pulmonary reactions, cardiac reactions, or hypotension) from preceding chemotherapies, active uncontrolled infection, active graft vs host disease (GVHD), or worsening of leukemia burden following lymphodepleting chemotherapy.    Other supportive medications that Artemio will also receive include:  1. Allopurinol (all patients should receive regardless of disease burden)  2. Hydration pre/post cyclophosphamide    We discussed the common side effects of the chemotherapy and supportive medications.  We also briefly discussed the possible need for TPN as nutritional support if nausea, vomiting, and mucositis make it difficult for Artemio to eat.    Cytokine Release Syndrome (CRS):  We discussed the timeline following Tisagenlecleucel (Kymriah) infusion the signs and symptoms of CRS and when to expect the onset of CRS.  We discussed the medication interventions that would be implemented to manage CRS including pressor support, tocilizumab, methylprednisolone and siltuximab.  Due to disease burden, patient concern, and  significant risk of CRS the plan is to admit the day prior to the Kymriah infusion to allow for closer monitoring in the immediate post infusion period when CRS risk is highest.      Nausea/Vomiting issues:   We discussed our standard anti-emetic protocol including a continuous infusion of ondansetron with rescue medications of lorazepam and diphenhydramine for breakthrough nausea and vomiting.    Historically did well with zofran or kytril per previous work up.  We will plan for scheduled twice daily Kytril as he will be receiving his lymphodepleting chemotherapy outpatient.     Pain issues:   We discussed our standard approach to pain management (e.g. Morphine drip).   Used morphine PCA during BMT, no narcan gtt needed.  He is currently suffering from mouth and throat pain, he was prescribed oxycodone today to help facilitate Artemio eating and drinking as an outpatient.    Infection Prophylaxis:   Viral prophylaxis: CMV Recipient: +, HSV Recipient: - [HD Valtrex Antiviral to continue until day +30]  Fungal prophylaxis: Micafungin 50 mg IV daily to begin with chemotherapy and to continue until day +30.  History of phototoxic drug eruption with voriconazole, avoid use.   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 We also discussed CRS and the measures that would be taken to mitigate CRS symptoms.  We had a good discussion; Artemio asked appropriate questions and expressed understanding.     Recommendations:   1. Chemotherapy planned for outpatient administration, with admission on 7/26 prior to Kymriah infusion  2. Begin all prophylactic agents with the start of chemotherapy on 7/21 (Ordered by Dr. Cheng Ramos, BMT fellow to be delivered to the [facility] on 7/21)   - Micafungin for fungal ppx 50 mg IV once daily  - Valtrex for viral ppx 1 g PO TID  - Levofloxacin for bacterial ppx 500 mg every day  - Allopurinol for TLS ppx 150 mg PO TID  - Kytril for CINV ppx 1 mg PO BID    3. Artemio is at HIGH risk for TLS, watch electrolytes and fluid status closely, ensure he takes the allopurinol, use institutional guidelines for the administration of rasburicase.  4. Artemio is reporting pain in his throat, connect with RD to ensure adequate caloric and fluid needs are being met  5. Watch LFTs as they are elevated at baseline and may rise with Kymriah, avoid hepatotoxic medications and be cognizant of dosing, he has a history of inotuzumab treatment, consider ursodiol if bilirubin climbs  6. Artemio reported he is having trouble sleeping, and that few things have worked well for his nausea - I suggested we could try Zyprexa, and he was interested when he is in the hospital but does not desire for outpatient use  7. Chris Lombardo is the pharmacist at [facility] that has facilitated dispensing Artemio's oral chemotherapy - specifically the Jakafi - and the family has a good therapeutic relationship with him. (phone [phone])      Additionally for all Kymriah patients:  1. DO  "NOT USE STEROIDS UNLESS PART OF THE CRS ALGORITHM (OR IN A LIFE THREATENING EMERGENCY).  Methylprednisolone is preferred over dexamethasone.  2. Kymriah infusion requires tylenol and benadryl premed.  3. The [place] treatment plan for the lymphodepleting chemotherapy is a separate [place] treatment plan from the Kymriah infusion.  Both plans can be entered in the treatment plan at the same time.  The Kymriah treatment plan should be entered under \"Add Future Plan\" with a date to start on 7/27/2020.  4. NO GCSF - Short-acting granulosyte colony stimulating factor (GCSF) should not be given within 72 hours of the CAR-T infusion and until CRS has resolved. Long acting GCSF should not be given within 10 days of CAR-T infusion and until CRS is resolved.  The effects granulocyte colony stimulating factor are unknown. (NOTE: Myeloid growth factors, particularly GM-CSF, are NOT recommended during the first 3 weeks after CAR-T infusion or until CRS has resolved due to the potential to worsen CRS symptoms)  5. All patients are to be started on allopurinol regardless of tumor burden.  6. Monitor for signs and symptoms of cytokine release syndrome (CRS).  Utilize the [employer] CRS Management Algorithm for CRS Management.  Please utilize the inpatient orderset titled \"BMT Cytokine Release Syndrome CAR-T (TOCilizumab) Therapy PEDIATRIC  7. I have confirmed with our pharmacy buyer group that two doses of tocilizumab are available for Artemio in the inpatient pharmacy and labeled patient specific.    Additionally, Dr. Viky Zavala, the  of the protocol, has been notified that two doses of tocilizumab are in the inpatient pharmacy for this specific patient.        It was a pleasure to be involved in Artemio s care.  Pharmacy will continue to monitor.  Margaret Kelly, PharmD    Patient Aretmio Nino (MR#[id number]) will receive tisagenlecleucel (Kymriah) infusion on Monday 7/27.  It is required that " the inpatient pharmacy have two doses of tociluzimab reserved for this patient prior to administering the tisagenlecleucel (Kymriah) cells.     We have in the main pharmacy labeled patient specific two doses of tociluzimab for this patient.  Tociluzimab dose = 480 mg (8 mg/kg)    We have ensured that a dose of siltuximab is accessible within 24 hours.    Siltuximab dose = 650 mg (11 mg/kg)

## 2020-07-14 NOTE — TELEPHONE ENCOUNTER
I tried calling the patient about completing their wellness screening for their future appointment. There was no answer, and I was unable to leave a message for them to call us back.     Byron Schwab LPN

## 2020-07-15 ENCOUNTER — HOSPITAL ENCOUNTER (OUTPATIENT)
Dept: CARDIOLOGY | Facility: CLINIC | Age: 20
End: 2020-07-15
Attending: PEDIATRICS
Payer: COMMERCIAL

## 2020-07-15 ENCOUNTER — ONCOLOGY VISIT (OUTPATIENT)
Dept: TRANSPLANT | Facility: CLINIC | Age: 20
End: 2020-07-15
Attending: PHYSICIAN ASSISTANT
Payer: COMMERCIAL

## 2020-07-15 ENCOUNTER — ALLIED HEALTH/NURSE VISIT (OUTPATIENT)
Dept: TRANSPLANT | Facility: CLINIC | Age: 20
End: 2020-07-15
Attending: PEDIATRICS
Payer: COMMERCIAL

## 2020-07-15 ENCOUNTER — HOSPITAL ENCOUNTER (OUTPATIENT)
Dept: CT IMAGING | Facility: CLINIC | Age: 20
End: 2020-07-15
Attending: PEDIATRICS
Payer: COMMERCIAL

## 2020-07-15 ENCOUNTER — MEDICAL CORRESPONDENCE (OUTPATIENT)
Dept: TRANSPLANT | Facility: CLINIC | Age: 20
End: 2020-07-15

## 2020-07-15 ENCOUNTER — INFUSION THERAPY VISIT (OUTPATIENT)
Dept: INFUSION THERAPY | Facility: CLINIC | Age: 20
End: 2020-07-15
Attending: PEDIATRICS
Payer: COMMERCIAL

## 2020-07-15 VITALS
WEIGHT: 129.9 LBS | HEART RATE: 96 BPM | BODY MASS INDEX: 19.24 KG/M2 | HEIGHT: 69 IN | DIASTOLIC BLOOD PRESSURE: 80 MMHG | RESPIRATION RATE: 20 BRPM | OXYGEN SATURATION: 99 % | SYSTOLIC BLOOD PRESSURE: 114 MMHG | TEMPERATURE: 98.8 F

## 2020-07-15 DIAGNOSIS — R11.0 NAUSEA: ICD-10-CM

## 2020-07-15 DIAGNOSIS — C91.02 ACUTE LYMPHOBLASTIC LEUKEMIA (ALL) IN RELAPSE (H): Primary | ICD-10-CM

## 2020-07-15 DIAGNOSIS — C91.00 ALL (ACUTE LYMPHOCYTIC LEUKEMIA) (H): ICD-10-CM

## 2020-07-15 DIAGNOSIS — Z94.81 S/P ALLOGENEIC BONE MARROW TRANSPLANT (H): ICD-10-CM

## 2020-07-15 DIAGNOSIS — Z78.9 ON TOTAL PARENTERAL NUTRITION: ICD-10-CM

## 2020-07-15 DIAGNOSIS — C91.02 ALL (ACUTE LYMPHOID LEUKEMIA) IN RELAPSE (H): Primary | ICD-10-CM

## 2020-07-15 DIAGNOSIS — C91.02 ALL (ACUTE LYMPHOID LEUKEMIA) IN RELAPSE (H): ICD-10-CM

## 2020-07-15 DIAGNOSIS — Z94.81 S/P ALLOGENEIC BONE MARROW TRANSPLANT (H): Primary | ICD-10-CM

## 2020-07-15 LAB
ABO + RH BLD: NORMAL
ABO + RH BLD: NORMAL
ALBUMIN SERPL-MCNC: 4 G/DL (ref 3.4–5)
ALBUMIN UR-MCNC: 30 MG/DL
ALP SERPL-CCNC: 65 U/L (ref 40–150)
ALT SERPL W P-5'-P-CCNC: 350 U/L (ref 0–70)
ANION GAP SERPL CALCULATED.3IONS-SCNC: 7 MMOL/L (ref 3–14)
APPEARANCE UR: CLEAR
APTT PPP: 35 SEC (ref 22–37)
AST SERPL W P-5'-P-CCNC: 98 U/L (ref 0–45)
BASOPHILS # BLD AUTO: 0 10E9/L (ref 0–0.2)
BASOPHILS NFR BLD AUTO: 0 %
BILIRUB SERPL-MCNC: 1 MG/DL (ref 0.2–1.3)
BILIRUB UR QL STRIP: NEGATIVE
BLASTS # BLD: 0.8 10E9/L
BLASTS BLD QL SMEAR: 26.3 %
BLD GP AB SCN SERPL QL: NORMAL
BLOOD BANK CMNT PATIENT-IMP: NORMAL
BUN SERPL-MCNC: 19 MG/DL (ref 7–30)
BURR CELLS BLD QL SMEAR: SLIGHT
CALCIUM SERPL-MCNC: 9.3 MG/DL (ref 8.5–10.1)
CD19 CELLS # BLD: 626 CELLS/UL (ref 107–698)
CD19 CELLS NFR BLD: 61 % (ref 6–27)
CD3 CELLS # BLD: 398 CELLS/UL (ref 603–2990)
CD3 CELLS NFR BLD: 39 % (ref 49–84)
CD3+CD4+ CELLS # BLD: 246 CELLS/UL (ref 441–2156)
CD3+CD4+ CELLS NFR BLD: 24 % (ref 28–63)
CD3+CD4+ CELLS/CD3+CD8+ CLL BLD: 1.71 % (ref 1.4–2.6)
CD3+CD8+ CELLS # BLD: 141 CELLS/UL (ref 125–1312)
CD3+CD8+ CELLS NFR BLD: 14 % (ref 10–40)
CD3-CD16+CD56+ CELLS # BLD: 2 CELLS/UL (ref 95–640)
CD3-CD16+CD56+ CELLS NFR BLD: <1 % (ref 4–25)
CELL TRANSPLANT TYPE: NORMAL
CHLORIDE SERPL-SCNC: 109 MMOL/L (ref 94–109)
CO2 SERPL-SCNC: 25 MMOL/L (ref 20–32)
COLOR UR AUTO: YELLOW
CREAT SERPL-MCNC: 0.78 MG/DL (ref 0.66–1.25)
DACRYOCYTES BLD QL SMEAR: SLIGHT
DIFFERENTIAL METHOD BLD: ABNORMAL
ELLIPTOCYTES BLD QL SMEAR: SLIGHT
EOSINOPHIL # BLD AUTO: 0 10E9/L (ref 0–0.7)
EOSINOPHIL NFR BLD AUTO: 0 %
ERYTHROCYTE [DISTWIDTH] IN BLOOD BY AUTOMATED COUNT: 14.9 % (ref 10–15)
FERRITIN SERPL-MCNC: 4598 NG/ML (ref 26–388)
FIBRINOGEN PPP-MCNC: 475 MG/DL (ref 200–420)
GFR SERPL CREATININE-BSD FRML MDRD: >90 ML/MIN/{1.73_M2}
GLUCOSE SERPL-MCNC: 122 MG/DL (ref 70–99)
GLUCOSE UR STRIP-MCNC: NEGATIVE MG/DL
HCT VFR BLD AUTO: 31.5 % (ref 40–53)
HGB BLD-MCNC: 10 G/DL (ref 13.3–17.7)
HGB UR QL STRIP: ABNORMAL
IFC SPECIMEN: ABNORMAL
INR PPP: 1.18 (ref 0.86–1.14)
KETONES UR STRIP-MCNC: NEGATIVE MG/DL
LDH SERPL L TO P-CCNC: 200 U/L (ref 85–227)
LEUKOCYTE ESTERASE UR QL STRIP: NEGATIVE
LYMPHOCYTES # BLD AUTO: 2.1 10E9/L (ref 0.8–5.3)
LYMPHOCYTES NFR BLD AUTO: 72.8 %
MAGNESIUM SERPL-MCNC: 2.1 MG/DL (ref 1.6–2.3)
MCH RBC QN AUTO: 27.9 PG (ref 26.5–33)
MCHC RBC AUTO-ENTMCNC: 31.7 G/DL (ref 31.5–36.5)
MCV RBC AUTO: 88 FL (ref 78–100)
MONOCYTES # BLD AUTO: 0 10E9/L (ref 0–1.3)
MONOCYTES NFR BLD AUTO: 0.9 %
MUCOUS THREADS #/AREA URNS LPF: PRESENT /LPF
NEUTROPHILS # BLD AUTO: 0 10E9/L (ref 1.6–8.3)
NEUTROPHILS NFR BLD AUTO: 0 %
NITRATE UR QL: NEGATIVE
PH UR STRIP: 6 PH (ref 5–7)
PHOSPHATE SERPL-MCNC: 3.4 MG/DL (ref 2.5–4.5)
PLATELET # BLD AUTO: 28 10E9/L (ref 150–450)
PLATELET # BLD EST: ABNORMAL 10*3/UL
POIKILOCYTOSIS BLD QL SMEAR: ABNORMAL
POTASSIUM SERPL-SCNC: 3.6 MMOL/L (ref 3.4–5.3)
PROT SERPL-MCNC: 7.6 G/DL (ref 6.8–8.8)
RBC # BLD AUTO: 3.59 10E12/L (ref 4.4–5.9)
RBC #/AREA URNS AUTO: 6 /HPF (ref 0–2)
SMUDGE CELLS BLD QL SMEAR: PRESENT
SODIUM SERPL-SCNC: 141 MMOL/L (ref 133–144)
SOURCE: ABNORMAL
SP GR UR STRIP: 1.03 (ref 1–1.03)
SPECIMEN EXP DATE BLD: NORMAL
TARGETS BLD QL SMEAR: SLIGHT
URATE SERPL-MCNC: 3.5 MG/DL (ref 3.5–7.2)
UROBILINOGEN UR STRIP-MCNC: NORMAL MG/DL (ref 0–2)
WBC # BLD AUTO: 2.9 10E9/L (ref 4–11)
WBC #/AREA URNS AUTO: 1 /HPF (ref 0–5)

## 2020-07-15 PROCEDURE — 84100 ASSAY OF PHOSPHORUS: CPT | Performed by: PEDIATRICS

## 2020-07-15 PROCEDURE — 87516 HEPATITIS B DNA AMP PROBE: CPT | Performed by: PEDIATRICS

## 2020-07-15 PROCEDURE — 87798 DETECT AGENT NOS DNA AMP: CPT | Performed by: PEDIATRICS

## 2020-07-15 PROCEDURE — 84550 ASSAY OF BLOOD/URIC ACID: CPT | Performed by: PEDIATRICS

## 2020-07-15 PROCEDURE — 25000128 H RX IP 250 OP 636: Mod: ZF

## 2020-07-15 PROCEDURE — 83021 HEMOGLOBIN CHROMOTOGRAPHY: CPT | Performed by: PEDIATRICS

## 2020-07-15 PROCEDURE — 36591 DRAW BLOOD OFF VENOUS DEVICE: CPT

## 2020-07-15 PROCEDURE — 86900 BLOOD TYPING SEROLOGIC ABO: CPT | Performed by: PEDIATRICS

## 2020-07-15 PROCEDURE — 87521 HEPATITIS C PROBE&RVRS TRNSC: CPT | Performed by: PEDIATRICS

## 2020-07-15 PROCEDURE — 86695 HERPES SIMPLEX TYPE 1 TEST: CPT | Performed by: PEDIATRICS

## 2020-07-15 PROCEDURE — 86704 HEP B CORE ANTIBODY TOTAL: CPT | Performed by: PEDIATRICS

## 2020-07-15 PROCEDURE — 86359 T CELLS TOTAL COUNT: CPT | Performed by: PEDIATRICS

## 2020-07-15 PROCEDURE — 86687 HTLV-I ANTIBODY: CPT | Performed by: PEDIATRICS

## 2020-07-15 PROCEDURE — 86696 HERPES SIMPLEX TYPE 2 TEST: CPT | Performed by: PEDIATRICS

## 2020-07-15 PROCEDURE — 85384 FIBRINOGEN ACTIVITY: CPT | Performed by: PEDIATRICS

## 2020-07-15 PROCEDURE — 93005 ELECTROCARDIOGRAM TRACING: CPT | Mod: ZF

## 2020-07-15 PROCEDURE — 86644 CMV ANTIBODY: CPT | Performed by: PEDIATRICS

## 2020-07-15 PROCEDURE — 86780 TREPONEMA PALLIDUM: CPT | Performed by: PEDIATRICS

## 2020-07-15 PROCEDURE — 87535 HIV-1 PROBE&REVERSE TRNSCRPJ: CPT | Performed by: PEDIATRICS

## 2020-07-15 PROCEDURE — 80053 COMPREHEN METABOLIC PANEL: CPT | Performed by: PEDIATRICS

## 2020-07-15 PROCEDURE — 86850 RBC ANTIBODY SCREEN: CPT | Performed by: PEDIATRICS

## 2020-07-15 PROCEDURE — 81001 URINALYSIS AUTO W/SCOPE: CPT | Performed by: PEDIATRICS

## 2020-07-15 PROCEDURE — 70486 CT MAXILLOFACIAL W/O DYE: CPT

## 2020-07-15 PROCEDURE — 86901 BLOOD TYPING SEROLOGIC RH(D): CPT | Performed by: PEDIATRICS

## 2020-07-15 PROCEDURE — 86355 B CELLS TOTAL COUNT: CPT | Performed by: PEDIATRICS

## 2020-07-15 PROCEDURE — 71250 CT THORAX DX C-: CPT

## 2020-07-15 PROCEDURE — 83615 LACTATE (LD) (LDH) ENZYME: CPT | Performed by: PEDIATRICS

## 2020-07-15 PROCEDURE — 86703 HIV-1/HIV-2 1 RESULT ANTBDY: CPT | Performed by: PEDIATRICS

## 2020-07-15 PROCEDURE — 86803 HEPATITIS C AB TEST: CPT | Performed by: PEDIATRICS

## 2020-07-15 PROCEDURE — 86665 EPSTEIN-BARR CAPSID VCA: CPT | Performed by: PEDIATRICS

## 2020-07-15 PROCEDURE — 85730 THROMBOPLASTIN TIME PARTIAL: CPT | Performed by: PEDIATRICS

## 2020-07-15 PROCEDURE — 86360 T CELL ABSOLUTE COUNT/RATIO: CPT | Performed by: PEDIATRICS

## 2020-07-15 PROCEDURE — 86357 NK CELLS TOTAL COUNT: CPT | Performed by: PEDIATRICS

## 2020-07-15 PROCEDURE — G0463 HOSPITAL OUTPT CLINIC VISIT: HCPCS | Mod: 25,ZF

## 2020-07-15 PROCEDURE — 83735 ASSAY OF MAGNESIUM: CPT | Performed by: PEDIATRICS

## 2020-07-15 PROCEDURE — 82728 ASSAY OF FERRITIN: CPT | Performed by: PEDIATRICS

## 2020-07-15 PROCEDURE — 85610 PROTHROMBIN TIME: CPT | Performed by: PEDIATRICS

## 2020-07-15 PROCEDURE — 86753 PROTOZOA ANTIBODY NOS: CPT | Performed by: PEDIATRICS

## 2020-07-15 PROCEDURE — 82784 ASSAY IGA/IGD/IGG/IGM EACH: CPT | Performed by: PEDIATRICS

## 2020-07-15 PROCEDURE — 87340 HEPATITIS B SURFACE AG IA: CPT | Performed by: PEDIATRICS

## 2020-07-15 PROCEDURE — 85025 COMPLETE CBC W/AUTO DIFF WBC: CPT | Performed by: PEDIATRICS

## 2020-07-15 PROCEDURE — 93306 TTE W/DOPPLER COMPLETE: CPT

## 2020-07-15 RX ORDER — DIPHENHYDRAMINE HCL 25 MG
25 CAPSULE ORAL ONCE
Status: CANCELLED
Start: 2020-07-15

## 2020-07-15 RX ORDER — HEPARIN SODIUM (PORCINE) LOCK FLUSH IV SOLN 100 UNIT/ML 100 UNIT/ML
SOLUTION INTRAVENOUS
Status: COMPLETED
Start: 2020-07-15 | End: 2020-07-15

## 2020-07-15 RX ORDER — ACETAMINOPHEN 325 MG/1
650 TABLET ORAL ONCE
Status: CANCELLED
Start: 2020-07-15

## 2020-07-15 RX ORDER — PANTOPRAZOLE SODIUM 40 MG/1
40 TABLET, DELAYED RELEASE ORAL DAILY
Qty: 30 TABLET | Refills: 1 | Status: ON HOLD | OUTPATIENT
Start: 2020-07-15 | End: 2020-08-13

## 2020-07-15 RX ADMIN — HEPARIN 500 UNITS: 100 SYRINGE at 09:50

## 2020-07-15 ASSESSMENT — MIFFLIN-ST. JEOR: SCORE: 1577.34

## 2020-07-15 NOTE — PROGRESS NOTES
Port accessed without difficulty. Labs drawn as ordered from port. Port heparin locked and de-accessed.

## 2020-07-15 NOTE — PROGRESS NOTES
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 He is here today to begin CAR-T (Kymriah) workup per [id number].  He had a BMBx/LP/ITC performed at [facility] yesterday (7/14) and we are awaiting results. He completed apheresis collection in the [facility] last month (6/2020). He has been receiving bridging chemotherapy with oral 6MP, methotrexate and ruxolitinib.  He stopped all oral chemotherapy on 7/12. Both non-myeloablative chemotherapy (7/21-7/24) and CAR-T infusion (7/27) will be outpatient in the [facility] clinic.     Isael is here today with his father. He has a history of significant spinal headaches in the past but no headache reported following his LP yesterday. He received oral caffeine before his procedure and he took one dose again last night. He was also given a 4-day course of gabapentin but Isael did not take it because he was feeling well. He was given tramadol prn for bone marrow biopsy pain but did not have any discomfort and did not take any.  Approximately two weeks ago he developed a fever of 102F with sore throat, headache, emesis x 1 and lightheadedness. No respiratory symptoms. He was admitted to [facility] for 3-4 days and then discharged. He said Covid and other tests were negative. He was told it was likely a virus. He has been afebrile and generally clinically well since dischage. He has developed mouth sores but he said it's mild and he manages it by rinsing his mouth. Appetite ok. No diarrhea, rash or other pain concerns. He decided to stop all of his meds at the time he stopped his oral chemotherapy on 7/12. He has been c/o acid reflux and  has been using TUMS but with little relief. Occasional nausea without emesis, using benadryl prn.  Previously he has not required blood product premedications but recently developed hives of the neck and face during his last four platelet transfusions.  He now requires platelet premedications with tylenol and benadryl. He said they sometimes repeat his benadryl dose midway through his transfusion. He never had tongue swelling or respiratory compromise. His last platelet transfusion was on 7/13 and his last RBC transfusion was approximately two weeks ago.    ROS: A complete review of systems is negative except as noted in HPI    Past Medical History:  B cell ALL s/p matched sibling BMT, now in relapse  Aaron-Parkinson-White syndrome  Vitamin D deficieny  Spinal headache  Port-a-cath placement (6/10/20)    Family History:  Maternal grandfather: Thrombocytopenia  Paternal grandfather: Prostate cancer, type 2 diabetes.    Social History: His mother (Mervat) is going to be relocating temporarily from [place] to be his full-time caregiver. Lodging through [facility] [employer]. His father (Dangelo), stepmother (Camryn) and paternal grandparents are all involved in his care. Isael has authorized communication with all of them.     Medications  metroNIDAZOLE (METROCREAM) 0.75 % external cream, Apply topically 2 times daily (Patient not taking: Reported on 6/5/2020)  mupirocin (BACTROBAN) 2 % external ointment, Use 2 times a day to affected area. (Patient not taking: Reported on 6/5/2020)  pantoprazole (PROTONIX) 40 MG EC tablet, Take 1 tablet (40 mg) by mouth 2 times daily  sulfamethoxazole-trimethoprim (BACTRIM DS/SEPTRA DS) 800-160 MG per tablet, Take 1 tablet by mouth Every Mon, Tues two times daily (Patient not taking: Reported on 11/7/2019)  tacrolimus (PROTOPIC) 0.1 % external ointment, Apply topically 2 times daily (Patient not taking: Reported on 9/27/2019)  tretinoin (RETIN-A) 0.025 % external cream, Use every night (Patient  "not taking: Reported on 11/7/2019)    No current facility-administered medications on file prior to visit.     Allergies      Allergies   Allergen Reactions     Blood Transfusion Related (Informational Only) Other (See Comments)     Patient has a history of a clinically significant antibody against RBC antigens.  A delay in compatible RBCs may occur.Stem cell transplant patient.  Give type O RBCs.     No Known Drug Allergies        Physical Exam   /80 (BP Location: Right arm, Patient Position: Fowlers, Cuff Size: Adult Regular)   Pulse 96   Temp 98.8  F (37.1  C) (Oral)   Resp 20   Ht 1.749 m (5' 8.86\")   Wt 58.9 kg (129 lb 14.4 oz)   SpO2 99%   BMI 19.26 kg/m    GEN: Sitting on exam table in NAD. Pleasant and cooperative. Dad present  HEENT Head NC/AT, TMs clear bilaterally, sclerae clear, nares patent, OP with small scattered lesions, MMM  NECK: Supple. No cervical lymphadenopathy  CARD: Regular rate and rhythm. Normal S1 and S2, no murmurs, rubs or gallops. Cap refill < 2 sec  RESP: Lungs Clear to auscultation bilaterally. No increased work of breathing. No adventitious lung sounds.  ABD: Non-distended, non-tender, no organomegaly. Normal bowel sounds  EXTREM: No edema noted  SKIN: No rashes, bruising or petechiae. Port upper right chest, not accessed.    Labs: Results pending    Assessment/Plan:  Artemio Nino is a  20 year old male with 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 CD19 positive (dim). He is here today to begin CAR-T (Kymriah) workup per [id number].     ALL and BMT: High risk B cell ALL (CNS negative) + JAK2 activation.  - Related bone marrow transplant 11/2/18  - Relapsed 6/5/2020, 19 months status post matched related BMT per [id number] (cytoxan/TBI arm) with CD19 (dim) relapsed b-cell lymphoblastic leukemia.  - Completed apheresis [facility] (6/2020).   - BMBx/LP/ITC performed at [facility] yesterday (7/14) and we are awaiting results.   - He has been receiving bridging chemotherapy with oral 6MP, methotrexate and ruxolitinib.    - All oral chemotherapy stopped on 7/12.   - Exit consultation 7/16 with Dr. Senior.  - Line access currently with a port (placed 6/10). Double burns line placement scheduled for 7/20.   - Both non-myeloablative chemotherapy per [id number] with cytoxan and fludarabine (7/21-7/24) and CAR-T infusion (7/27) will be outpatient in the [facility].     Heme:   - Goals hgb > 7 and platelets > 50k for line placement on 7/20   - New history of significant hives with platelet transfusions x last 4 transfusions. Premedicate with tylenol and benadryl for platelets. May also need mid-med.  Last transfusion on 7/13. No premedications needed for RBC transfusions. Last RBC transfusion approximately two weeks ago.   - Platelet transfusion set up for Friday 7/17 at noon but infusion is looking into earlier appointment because they want to travel out of town. Platelets must be > 50k for CVC placement on 7/20. He will likely need an additional platelet transfusion on 7/20 but there is no availability in the [facility] infusion center. Will need to coordinate platelet transfusion with Peds Sedation.    # Coags:  INR 1.18, PTT 35    GVHD:  No signs of GvHD to date.     FEN/Renal:  Regular diet.    - GFR scheduled for 7/16     GI:  # Acid reflux:  Ordered protonix once daily. Continue TUMS prn    # Nausea: Mild and intermittent. Currently managed with benadryl prn    # Transaminitis:  , AST 98     ID:  Hospital admission for fever, sore throat, headache, emesis x 1 and lightheadedness at [facility] approximately two weeks ago for 3-4 days. Covid neg. Likely viral. Symptoms resolved.  - ANC 0.0. At very high risk for infection  - Repeat Covid testing 7/17 prior to CVC placement on 7/20  - BMT infectious disease panel  and ID labs drawn today, results pending  - Begin fungal ppx with micafungin IV following CVC placement.    Imaging:  - Chest and sinus CT 7/15    UA:  - RBC 6, protein albumin 30.  Asymptomatic    Pulmonary:  No current concerns.  - History of pneumomediastinum: finding upon work up Chest CT (10/22/19), asymptomatic. Per radiology, likely benign, perhaps transient.       Cardiovascular: Aaron-Parkinson-White Syndrome: diagnosed upon syncope in 02/2018. Asymptomatic, no interventions to date. No current concerns.  - EKG today revealed WPW with normal sinus rhythm. Will reach out to cardiology for their input  - Echocardiogram ordered for later today.       Derm:  Dermatology consultation 7/24. No current rash. He is no longer using creams.      Fertility:  Sperm collected pre chemotherapy for fertility preservation    Candido Han PA-C  Pediatric Blood and Marrow Transplant Program  [facility]      Patient Active Problem List   Diagnosis     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)     Aaron-Parkinson-White (WPW) syndrome     Bone marrow transplant candidate     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)     At risk for infection     S/P allogeneic bone marrow transplant (H)     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)

## 2020-07-15 NOTE — NURSING NOTE
"Chief Complaint   Patient presents with     RECHECK     Patient is here today for ALL follow up      /80 (BP Location: Right arm, Patient Position: Fowlers, Cuff Size: Adult Regular)   Pulse 96   Temp 98.8  F (37.1  C) (Oral)   Resp 20   Ht 1.749 m (5' 8.86\")   Wt 58.9 kg (129 lb 14.4 oz)   SpO2 99%   BMI 19.26 kg/m      Data Unavailable  Mouth sores, Acid reflux    I have reviewed the patients medication and allergy list.    Patient needs refills: no    Dressing change needed? No    EKG needed? Yes. 10 lead EKG was preformed today in clinic. Patient tolerated well     Donna Andrews LPN  July 15, 2020  "

## 2020-07-16 ENCOUNTER — CARE COORDINATION (OUTPATIENT)
Dept: TRANSPLANT | Facility: CLINIC | Age: 20
End: 2020-07-16

## 2020-07-16 ENCOUNTER — TRANSFERRED RECORDS (OUTPATIENT)
Dept: TRANSPLANT | Facility: CLINIC | Age: 20
End: 2020-07-16

## 2020-07-16 ENCOUNTER — ONCOLOGY VISIT (OUTPATIENT)
Dept: TRANSPLANT | Facility: CLINIC | Age: 20
End: 2020-07-16
Attending: PEDIATRICS
Payer: COMMERCIAL

## 2020-07-16 ENCOUNTER — INFUSION THERAPY VISIT (OUTPATIENT)
Dept: INFUSION THERAPY | Facility: CLINIC | Age: 20
End: 2020-07-16
Attending: PEDIATRICS
Payer: COMMERCIAL

## 2020-07-16 ENCOUNTER — HOSPITAL ENCOUNTER (OUTPATIENT)
Dept: NUCLEAR MEDICINE | Facility: CLINIC | Age: 20
Setting detail: NUCLEAR MEDICINE
Discharge: HOME | End: 2020-07-16
Attending: PEDIATRICS | Admitting: PEDIATRICS
Payer: COMMERCIAL

## 2020-07-16 DIAGNOSIS — Z94.81 S/P ALLOGENEIC BONE MARROW TRANSPLANT (H): Primary | ICD-10-CM

## 2020-07-16 DIAGNOSIS — Z71.9 ENCOUNTER FOR COUNSELING: Primary | ICD-10-CM

## 2020-07-16 DIAGNOSIS — C91.02 ALL (ACUTE LYMPHOID LEUKEMIA) IN RELAPSE (H): Primary | ICD-10-CM

## 2020-07-16 DIAGNOSIS — C91.01 ACUTE LYMPHOBLASTIC LEUKEMIA (ALL) IN REMISSION (H): Primary | ICD-10-CM

## 2020-07-16 DIAGNOSIS — C91.02 ACUTE LYMPHOBLASTIC LEUKEMIA (ALL) IN RELAPSE (H): ICD-10-CM

## 2020-07-16 DIAGNOSIS — K12.30 MUCOSITIS: ICD-10-CM

## 2020-07-16 LAB
EBV VCA IGG SER QL IA: >8 AI (ref 0–0.8)
HSV1 IGG SERPL QL IA: 0.8 AI (ref 0–0.8)
HSV2 IGG SERPL QL IA: <0.2 AI (ref 0–0.8)
IGG SERPL-MCNC: 914 MG/DL (ref 610–1616)

## 2020-07-16 PROCEDURE — 34300033 ZZH RX 343: Performed by: PEDIATRICS

## 2020-07-16 PROCEDURE — G0463 HOSPITAL OUTPT CLINIC VISIT: HCPCS

## 2020-07-16 PROCEDURE — A9539 TC99M PENTETATE: HCPCS | Performed by: PEDIATRICS

## 2020-07-16 PROCEDURE — 78725 KIDNEY FUNCTION STUDY: CPT

## 2020-07-16 PROCEDURE — 25000128 H RX IP 250 OP 636: Performed by: PEDIATRICS

## 2020-07-16 RX ORDER — ALBUTEROL SULFATE 90 UG/1
1-2 AEROSOL, METERED RESPIRATORY (INHALATION)
Status: CANCELLED
Start: 2020-07-24

## 2020-07-16 RX ORDER — DIPHENHYDRAMINE HYDROCHLORIDE 50 MG/ML
0.5 INJECTION INTRAMUSCULAR; INTRAVENOUS EVERY 6 HOURS PRN
Status: CANCELLED
Start: 2020-07-22

## 2020-07-16 RX ORDER — SODIUM CHLORIDE 9 MG/ML
200 INJECTION, SOLUTION INTRAVENOUS CONTINUOUS PRN
Status: CANCELLED | OUTPATIENT
Start: 2020-07-24

## 2020-07-16 RX ORDER — VALACYCLOVIR HYDROCHLORIDE 1 G/1
1000 TABLET, FILM COATED ORAL 3 TIMES DAILY
Qty: 84 TABLET | Refills: 0 | Status: SHIPPED | OUTPATIENT
Start: 2020-07-16 | End: 2020-08-28

## 2020-07-16 RX ORDER — DIPHENHYDRAMINE HYDROCHLORIDE 50 MG/ML
50 INJECTION INTRAMUSCULAR; INTRAVENOUS
Status: CANCELLED
Start: 2020-07-23

## 2020-07-16 RX ORDER — DIPHENHYDRAMINE HYDROCHLORIDE 50 MG/ML
0.5 INJECTION INTRAMUSCULAR; INTRAVENOUS EVERY 6 HOURS PRN
Status: CANCELLED
Start: 2020-07-23

## 2020-07-16 RX ORDER — LORAZEPAM 2 MG/ML
0.02 INJECTION INTRAMUSCULAR EVERY 6 HOURS PRN
Status: CANCELLED
Start: 2020-07-21

## 2020-07-16 RX ORDER — SODIUM CHLORIDE 9 MG/ML
INJECTION, SOLUTION INTRAVENOUS CONTINUOUS
Status: CANCELLED | OUTPATIENT
Start: 2020-07-22

## 2020-07-16 RX ORDER — SODIUM CHLORIDE 9 MG/ML
200 INJECTION, SOLUTION INTRAVENOUS CONTINUOUS PRN
Status: CANCELLED | OUTPATIENT
Start: 2020-07-21

## 2020-07-16 RX ORDER — HEPARIN SODIUM,PORCINE 10 UNIT/ML
VIAL (ML) INTRAVENOUS
Status: DISCONTINUED
Start: 2020-07-16 | End: 2020-07-16 | Stop reason: HOSPADM

## 2020-07-16 RX ORDER — ALBUTEROL SULFATE 0.83 MG/ML
2.5 SOLUTION RESPIRATORY (INHALATION)
Status: CANCELLED | OUTPATIENT
Start: 2020-07-21

## 2020-07-16 RX ORDER — ALBUTEROL SULFATE 90 UG/1
1-2 AEROSOL, METERED RESPIRATORY (INHALATION)
Status: CANCELLED
Start: 2020-07-23

## 2020-07-16 RX ORDER — DIPHENHYDRAMINE HCL 25 MG
25 CAPSULE ORAL ONCE
Status: CANCELLED
Start: 2020-07-16

## 2020-07-16 RX ORDER — SODIUM CHLORIDE 9 MG/ML
200 INJECTION, SOLUTION INTRAVENOUS CONTINUOUS PRN
Status: CANCELLED | OUTPATIENT
Start: 2020-07-23

## 2020-07-16 RX ORDER — GRANISETRON HYDROCHLORIDE 1 MG/1
1 TABLET, FILM COATED ORAL 2 TIMES DAILY
Qty: 28 TABLET | Refills: 0 | Status: ON HOLD | OUTPATIENT
Start: 2020-07-16 | End: 2020-08-13

## 2020-07-16 RX ORDER — LORAZEPAM 2 MG/ML
0.02 INJECTION INTRAMUSCULAR EVERY 6 HOURS PRN
Status: CANCELLED
Start: 2020-07-22

## 2020-07-16 RX ORDER — DIPHENHYDRAMINE HCL 50 MG
50 CAPSULE ORAL EVERY 6 HOURS PRN
Qty: 20 CAPSULE | Refills: 0 | Status: ON HOLD | OUTPATIENT
Start: 2020-07-16 | End: 2020-08-13

## 2020-07-16 RX ORDER — HEPARIN SODIUM,PORCINE 10 UNIT/ML
3-6 VIAL (ML) INTRAVENOUS
Status: DISCONTINUED | OUTPATIENT
Start: 2020-07-16 | End: 2020-07-17 | Stop reason: HOSPADM

## 2020-07-16 RX ORDER — ALBUTEROL SULFATE 0.83 MG/ML
2.5 SOLUTION RESPIRATORY (INHALATION)
Status: CANCELLED | OUTPATIENT
Start: 2020-07-23

## 2020-07-16 RX ORDER — SODIUM CHLORIDE 9 MG/ML
200 INJECTION, SOLUTION INTRAVENOUS CONTINUOUS PRN
Status: CANCELLED | OUTPATIENT
Start: 2020-07-22

## 2020-07-16 RX ORDER — METHYLPREDNISOLONE SODIUM SUCCINATE 125 MG/2ML
2 INJECTION, POWDER, LYOPHILIZED, FOR SOLUTION INTRAMUSCULAR; INTRAVENOUS
Status: CANCELLED | OUTPATIENT
Start: 2020-07-24

## 2020-07-16 RX ORDER — EPINEPHRINE 1 MG/ML
0.3 INJECTION, SOLUTION, CONCENTRATE INTRAVENOUS EVERY 5 MIN PRN
Status: CANCELLED | OUTPATIENT
Start: 2020-07-21

## 2020-07-16 RX ORDER — LORAZEPAM 2 MG/ML
0.02 INJECTION INTRAMUSCULAR EVERY 6 HOURS PRN
Status: CANCELLED
Start: 2020-07-23

## 2020-07-16 RX ORDER — EPINEPHRINE 1 MG/ML
0.3 INJECTION, SOLUTION, CONCENTRATE INTRAVENOUS EVERY 5 MIN PRN
Status: CANCELLED | OUTPATIENT
Start: 2020-07-24

## 2020-07-16 RX ORDER — LEVOFLOXACIN 500 MG/1
500 TABLET, FILM COATED ORAL DAILY
Qty: 14 TABLET | Refills: 0 | Status: ON HOLD | OUTPATIENT
Start: 2020-07-16 | End: 2020-08-13

## 2020-07-16 RX ORDER — ALBUTEROL SULFATE 90 UG/1
1-2 AEROSOL, METERED RESPIRATORY (INHALATION)
Status: CANCELLED
Start: 2020-07-21

## 2020-07-16 RX ORDER — METHYLPREDNISOLONE SODIUM SUCCINATE 125 MG/2ML
2 INJECTION, POWDER, LYOPHILIZED, FOR SOLUTION INTRAMUSCULAR; INTRAVENOUS
Status: CANCELLED | OUTPATIENT
Start: 2020-07-21

## 2020-07-16 RX ORDER — EPINEPHRINE 1 MG/ML
0.3 INJECTION, SOLUTION, CONCENTRATE INTRAVENOUS EVERY 5 MIN PRN
Status: CANCELLED | OUTPATIENT
Start: 2020-07-23

## 2020-07-16 RX ORDER — DIPHENHYDRAMINE HYDROCHLORIDE 50 MG/ML
50 INJECTION INTRAMUSCULAR; INTRAVENOUS
Status: CANCELLED
Start: 2020-07-22

## 2020-07-16 RX ORDER — ALBUTEROL SULFATE 0.83 MG/ML
2.5 SOLUTION RESPIRATORY (INHALATION)
Status: CANCELLED | OUTPATIENT
Start: 2020-07-22

## 2020-07-16 RX ORDER — DIPHENHYDRAMINE HYDROCHLORIDE 50 MG/ML
0.5 INJECTION INTRAMUSCULAR; INTRAVENOUS EVERY 6 HOURS PRN
Status: CANCELLED
Start: 2020-07-21

## 2020-07-16 RX ORDER — LORAZEPAM 0.5 MG/1
0.5 TABLET ORAL EVERY 6 HOURS PRN
Qty: 20 TABLET | Refills: 0 | Status: ON HOLD | OUTPATIENT
Start: 2020-07-16 | End: 2020-08-13

## 2020-07-16 RX ORDER — DIPHENHYDRAMINE HYDROCHLORIDE 50 MG/ML
50 INJECTION INTRAMUSCULAR; INTRAVENOUS
Status: CANCELLED
Start: 2020-07-21

## 2020-07-16 RX ORDER — METHYLPREDNISOLONE SODIUM SUCCINATE 125 MG/2ML
2 INJECTION, POWDER, LYOPHILIZED, FOR SOLUTION INTRAMUSCULAR; INTRAVENOUS
Status: CANCELLED | OUTPATIENT
Start: 2020-07-23

## 2020-07-16 RX ORDER — METHYLPREDNISOLONE SODIUM SUCCINATE 125 MG/2ML
2 INJECTION, POWDER, LYOPHILIZED, FOR SOLUTION INTRAMUSCULAR; INTRAVENOUS
Status: CANCELLED | OUTPATIENT
Start: 2020-07-22

## 2020-07-16 RX ORDER — ALBUTEROL SULFATE 0.83 MG/ML
2.5 SOLUTION RESPIRATORY (INHALATION)
Status: CANCELLED | OUTPATIENT
Start: 2020-07-24

## 2020-07-16 RX ORDER — DIPHENHYDRAMINE HYDROCHLORIDE 50 MG/ML
0.5 INJECTION INTRAMUSCULAR; INTRAVENOUS EVERY 6 HOURS PRN
Status: CANCELLED
Start: 2020-07-24

## 2020-07-16 RX ORDER — OXYCODONE HYDROCHLORIDE 5 MG/1
5 TABLET ORAL EVERY 6 HOURS PRN
Qty: 30 TABLET | Refills: 0 | Status: ON HOLD | OUTPATIENT
Start: 2020-07-16 | End: 2020-08-13

## 2020-07-16 RX ORDER — HEPARIN SODIUM (PORCINE) LOCK FLUSH IV SOLN 100 UNIT/ML 100 UNIT/ML
5 SOLUTION INTRAVENOUS
Status: DISCONTINUED | OUTPATIENT
Start: 2020-07-16 | End: 2020-07-17 | Stop reason: HOSPADM

## 2020-07-16 RX ORDER — SODIUM CHLORIDE 9 MG/ML
INJECTION, SOLUTION INTRAVENOUS CONTINUOUS
Status: CANCELLED | OUTPATIENT
Start: 2020-07-21

## 2020-07-16 RX ORDER — CALCIUM CARBONATE 500 MG/1
1-2 TABLET, CHEWABLE ORAL DAILY PRN
COMMUNITY
End: 2020-08-28

## 2020-07-16 RX ORDER — LORAZEPAM 2 MG/ML
0.02 INJECTION INTRAMUSCULAR EVERY 6 HOURS PRN
Status: CANCELLED
Start: 2020-07-24

## 2020-07-16 RX ORDER — ALBUTEROL SULFATE 90 UG/1
1-2 AEROSOL, METERED RESPIRATORY (INHALATION)
Status: CANCELLED
Start: 2020-07-22

## 2020-07-16 RX ORDER — ACETAMINOPHEN 325 MG/1
650 TABLET ORAL ONCE
Status: CANCELLED
Start: 2020-07-16

## 2020-07-16 RX ORDER — EPINEPHRINE 1 MG/ML
0.3 INJECTION, SOLUTION, CONCENTRATE INTRAVENOUS EVERY 5 MIN PRN
Status: CANCELLED | OUTPATIENT
Start: 2020-07-22

## 2020-07-16 RX ORDER — ALLOPURINOL 300 MG/1
150 TABLET ORAL 3 TIMES DAILY
Qty: 11 TABLET | Refills: 0 | Status: ON HOLD | OUTPATIENT
Start: 2020-07-21 | End: 2020-08-04

## 2020-07-16 RX ORDER — DIPHENHYDRAMINE HYDROCHLORIDE 50 MG/ML
50 INJECTION INTRAMUSCULAR; INTRAVENOUS
Status: CANCELLED
Start: 2020-07-24

## 2020-07-16 RX ADMIN — KIT FOR THE PREPARATION OF TECHNETIUM TC 99M PENTETATE 1.3 MILLICURIE: 20 INJECTION, POWDER, LYOPHILIZED, FOR SOLUTION INTRAVENOUS; RESPIRATORY (INHALATION) at 08:25

## 2020-07-16 RX ADMIN — HEPARIN 5 ML: 100 SYRINGE at 12:10

## 2020-07-16 ASSESSMENT — PAIN SCALES - GENERAL: PAINLEVEL: NO PAIN (0)

## 2020-07-16 NOTE — PROGRESS NOTES
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 Artemio had a reassessment bone marrow evaluation and LP done earlier this week. BM morphology was hypocellular with evidence of residual ALL and MRD was positive at 78% on flowcytometry. His CSF was reported to be negative for leukemic blasts. Testing of the hepatic, renal and cardiac function did not identify dysfunction that would be of concern in regards to eligibility, nor alter our plan for transplantation.  He is noted to have hepatic transaminitis (, AST 98) on labs done yesterday. Renal evaluation with a GFR study is normal and renal function on biochemistry testing was unremarkable. He has a prior diagnosis of WPW syndrome, follow up echocardiogram done as part of workup showed normal LVEF of 62%.     Screening was performed for the routine infectious concerns, including CMV, hepatitis B/C, HIV, West Nile and T cruzi. This testing documented IgG positives for CMV and EBV. CT scans of the chest and paranasal sinuses (7/15/2020) were only remarkable for a tiny unchanged pulmonary nodule (3 mm) stable since 2018, this is not concerning.     Based on this testing, we discussed with Artemio and his step-mother that there was still significant residual leukemia in his BM post chemotherapy for relapse. However, his CAR T-cell product has been successfully manufactured and his medical status is good based on recent workup. We also discussed that CAR T-cell therapy at this time still provided a good chance for long-term disease control and remission. Therefore, we decided Artemio Nino was in a good place to start conditioning and proceed with planned CAR T-cell therapy.     We  talked about the preparative regimen for CAR T-cell therapy, including the chemotherapy agents cyclophosphamide and fludarabine. The means of delivering the drugs and the possible adverse effects (nausea, vomiting, mucositis, myelosuppression, anorexia and alopecia) were discussed. Our discussion included the side effects and expected (as well as less likely) complications with CAR T-cell therapy using Kymriah. We explained that a majority of patients will experience some cytokine release syndrome (CRS) following CAR T-cell therapy and about 50% of them will experience more significant (grade 3-4) CRS. Additionally, a smaller proportion of patients can develop CAR T-cell therapy related neurotoxicity syndrome (ICANS) and this can also range from mild to severe. Both CRS and ICANS can necessitate transfer to ICU care for respiratory and / or cardiovascular support including mechanical ventilation. Directed therapy for CRS and ICANS can include tocilizumab (IL6 receptor antagonist) as well as corticosteroid (in some instances) with other supportive care. While both CRS and ICANS can be life-threatening when severe, they do respond to therapy and the neurologic features of ICANS are almost always completely reversible. There is evidence that the risks for CRS (and possibly ICANS) are higher in patients commencing CAR T-cell therapy with residual leukemia, as in Artemio's case. This was discussed with Artemio and his family, they confirmed their understanding of these risks.     The initial plan was for Artemio to receive both conditioning and post CAR-T cell infusion follow up as an out-patient. Over the past couple of days Artemio has noticed some throat discomfort and pain, possibly related to mucositis post-recent oral chemotherapy. No evidence of significant oral mucosal lesions, tonsillar enlargement or pharyngeal erythema was noted on examination. He denies any respiratory symptoms or fever and he is  scheduled for a COVID test tomorrow. Additionally, persisting residual ALL as noted on recent BM testing raises the risk of tumor lysis syndrome (TLS) on conditioning chemotherapy and CAR T-cell infusion. In view of the increased risk of CRS (most common in the first 8 days post infusion), ICANS, TLS, as well as his recent symptoms of possible mucositis, Artemio will remain inpatient during his CAR T-cell infusion and subsequent 7 day period post-infusion for close clinical monitoring.     We also discussed concerns regarding possible infectious complications (bacterial, fungal, and viral agents) and the possible life-threatening nature of these infections. The use of prophylactic and therapeutic anti-microbial therapy was outlined. The supportive care, including antiemetics during the preparative regimen, narcotics for mucositis, TPN and transfusions were discussed. We then outlined the criteria for discharge, and care in the clinic post-therapy, as well as the possibility of readmission at some point. Finally, we talked about the team approach on the inpatient floor including the opportunity to participate in rounds, and the interactions with the ICU should that be necessary.    Following initial therapeutic response to CAR T-cells, there does still remain a risk for relapse of leukemia subsequently with loss of CAR persistence. Therefore, Artemio will remain under close follow up including lab assessments for leukemia in his BM and CSF. If there is evidence of loss of CAR T-cell persistence or relapse of his ALL, he would be a candidate for allogeneic BMT.     In today's visit, we discussed in detail the research for which Artemio Nino is eligible. We discussed the potential risks and potential benefits of each protocol individually. We explained potential alternatives to the protocols discussed. We explained to the patient that participation is voluntary and that consent may be withdrawn at any time.      Lansky/Karnofsky score: 90    Active infections:  Nil.  Prior infections that require additional special prophylaxis considerations: Nil.  I reviewed and discussed infectious disease evaluation with the patient and the management plan during treatment.    Reproductive status: The patient received appropriate reproductive counseling and understood the need for effective contraception during the treatment procedures. He reports that he is not active sexually at this time. The possible adverse impact on fertility with preparative regimen was dicussed.    Dental health suitable to proceed: No obvious dental concerns noted.     After our detailed discussion above, Artemio signed the following consents for treatment and protocols after ample time was given for review:  [id number] (CAR T-cell consent)        [id number] (CIBMTR data collection)  [id number] (BMT database)    Artemio received a signed copy of the consents. Artemio and his family had the opportunity to ask questions that were answered to the best of my ability and to their apparent satisfaction.    Jaquelin Senior MD    Pediatric Blood and Marrow Transplantation    Written by:  Cheng Ramos MD  Fellow, Pediatric Blood and Marrow Transplant    I, Jaquelin Senior MD, saw this patient with the fellow and agree with the fellow s findings and plan of care as documented in note above with my edits. I spent a total of 60 minutes face-to-face with Artemio Nino during today s office visit. Over 50% of this time was spent counseling the patient and/or coordinating care as documented above. I spent an additional 15 minutes of non-face-to-face time reviewing records and coordinating care for CART therapy on 7/16/20.

## 2020-07-16 NOTE — LETTER
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 Artemio had a reassessment bone marrow evaluation and LP done earlier this week. BM morphology was hypocellular with evidence of residual ALL and MRD was positive at 78% on flowcytometry. His CSF was reported to be negative for leukemic blasts. Testing of the hepatic, renal and cardiac function did not identify dysfunction that would be of concern in regards to eligibility, nor alter our plan for transplantation.  He is noted to have hepatic transaminitis (, AST 98) on labs done yesterday. Renal evaluation with a GFR study is normal and renal function on biochemistry testing was unremarkable. He has a prior diagnosis of WPW syndrome, follow up echocardiogram done as part of workup showed normal LVEF of 62%.     Screening was performed for the routine infectious concerns, including CMV, hepatitis B/C, HIV, West Nile and T cruzi. This testing documented IgG positives for CMV and EBV. CT scans of the chest and paranasal sinuses (7/15/2020) were only remarkable for a tiny unchanged pulmonary nodule (3 mm) stable since 2018, this is not concerning.     Based on this testing, we discussed with Artemio and his step-mother that there was still significant residual leukemia in his BM post chemotherapy for relapse. However, his CAR T-cell product has been successfully manufactured and his medical status is good based on recent workup. We also discussed that CAR T-cell therapy at this time still provided a good chance for long-term disease control and remission. Therefore, we decided Artemio Nino was  in a good place to start conditioning and proceed with planned CAR T-cell therapy.     We talked about the preparative regimen for CAR T-cell therapy, including the chemotherapy agents cyclophosphamide and fludarabine. The means of delivering the drugs and the possible adverse effects (nausea, vomiting, mucositis, myelosuppression, anorexia and alopecia) were discussed. Our discussion included the side effects and expected (as well as less likely) complications with CAR T-cell therapy using Kymriah. We explained that a majority of patients will experience some cytokine release syndrome (CRS) following CAR T-cell therapy and about 50% of them will experience more significant (grade 3-4) CRS. Additionally, a smaller proportion of patients can develop CAR T-cell therapy related neurotoxicity syndrome (ICANS) and this can also range from mild to severe. Both CRS and ICANS can necessitate transfer to ICU care for respiratory and / or cardiovascular support including mechanical ventilation. Directed therapy for CRS and ICANS can include tocilizumab (IL6 receptor antagonist) as well as corticosteroid (in some instances) with other supportive care. While both CRS and ICANS can be life-threatening when severe, they do respond to therapy and the neurologic features of ICANS are almost always completely reversible. There is evidence that the risks for CRS (and possibly ICANS) are higher in patients commencing CAR T-cell therapy with residual leukemia, as in Artemio's case. This was discussed with Artemio and his family, they confirmed their understanding of these risks.     The initial plan was for Artemio to receive both conditioning and post CAR-T cell infusion follow up as an out-patient. Over the past couple of days Artemio has noticed some throat discomfort and pain, possibly related to mucositis post-recent oral chemotherapy. No evidence of significant oral mucosal lesions, tonsillar enlargement or pharyngeal  erythema was noted on examination. He denies any respiratory symptoms or fever and he is scheduled for a COVID test tomorrow. Additionally, persisting residual ALL as noted on recent BM testing raises the risk of tumor lysis syndrome (TLS) on conditioning chemotherapy and CAR T-cell infusion. In view of the increased risk of CRS (most common in the first 8 days post infusion), ICANS, TLS, as well as his recent symptoms of possible mucositis, Artemio will remain inpatient during his CAR T-cell infusion and subsequent 7 day period post-infusion for close clinical monitoring.     We also discussed concerns regarding possible infectious complications (bacterial, fungal, and viral agents) and the possible life-threatening nature of these infections. The use of prophylactic and therapeutic anti-microbial therapy was outlined. The supportive care, including antiemetics during the preparative regimen, narcotics for mucositis, TPN and transfusions were discussed. We then outlined the criteria for discharge, and care in the clinic post-therapy, as well as the possibility of readmission at some point. Finally, we talked about the team approach on the inpatient floor including the opportunity to participate in rounds, and the interactions with the ICU should that be necessary.    Following initial therapeutic response to CAR T-cells, there does still remain a risk for relapse of leukemia subsequently with loss of CAR persistence. Therefore, Artemio will remain under close follow up including lab assessments for leukemia in his BM and CSF. If there is evidence of loss of CAR T-cell persistence or relapse of his ALL, he would be a candidate for allogeneic BMT.     In today's visit, we discussed in detail the research for which Artemio Nino is eligible. We discussed the potential risks and potential benefits of each protocol individually. We explained potential alternatives to the protocols discussed. We explained to the  patient that participation is voluntary and that consent may be withdrawn at any time.     Lansky/Karnofsky score: 90    Active infections:  Nil.  Prior infections that require additional special prophylaxis considerations: Nil.  I reviewed and discussed infectious disease evaluation with the patient and the management plan during treatment.    Reproductive status: The patient received appropriate reproductive counseling and understood the need for effective contraception during the treatment procedures. He reports that he is not active sexually at this time. The possible adverse impact on fertility with preparative regimen was dicussed.    Dental health suitable to proceed: No obvious dental concerns noted.     After our detailed discussion above, Artemio signed the following consents for treatment and protocols after ample time was given for review:  [id number] (CAR T-cell consent)        [id number] (CIBMTR data collection)  [id number] (BMT database)    Artemio received a signed copy of the consents. Artemio and his family had the opportunity to ask questions that were answered to the best of my ability and to their apparent satisfaction.    Jaquelin Senior MD    Pediatric Blood and Marrow Transplantation    Written by:  Cheng Ramos MD  Fellow, Pediatric Blood and Marrow Transplant    I, Jaquelin Senior MD, saw this patient with the fellow and agree with the fellow s findings and plan of care as documented in note above with my edits. I spent a total of 60 minutes face-to-face with Artemio Nino during today s office visit. Over 50% of this time was spent counseling the patient and/or coordinating care as documented above. I spent an additional 15 minutes of non-face-to-face time reviewing records and coordinating care for CART therapy on 7/16/20.        Jaquelin Senior MD

## 2020-07-17 ENCOUNTER — HOSPITAL ENCOUNTER (INPATIENT)
Facility: CLINIC | Age: 20
LOS: 3 days | Discharge: HOME | End: 2020-07-20
Attending: PEDIATRICS | Admitting: NURSE PRACTITIONER
Payer: COMMERCIAL

## 2020-07-17 ENCOUNTER — ALLIED HEALTH/NURSE VISIT (OUTPATIENT)
Dept: TRANSPLANT | Facility: CLINIC | Age: 20
End: 2020-07-17
Attending: PEDIATRICS
Payer: COMMERCIAL

## 2020-07-17 ENCOUNTER — ONCOLOGY VISIT (OUTPATIENT)
Dept: TRANSPLANT | Facility: CLINIC | Age: 20
End: 2020-07-17
Attending: NURSE PRACTITIONER
Payer: COMMERCIAL

## 2020-07-17 ENCOUNTER — INFUSION THERAPY VISIT (OUTPATIENT)
Dept: INFUSION THERAPY | Facility: CLINIC | Age: 20
End: 2020-07-17
Attending: PEDIATRICS
Payer: COMMERCIAL

## 2020-07-17 VITALS
WEIGHT: 128.97 LBS | HEIGHT: 69 IN | OXYGEN SATURATION: 100 % | HEART RATE: 61 BPM | SYSTOLIC BLOOD PRESSURE: 88 MMHG | RESPIRATION RATE: 16 BRPM | TEMPERATURE: 98.7 F | BODY MASS INDEX: 19.1 KG/M2 | DIASTOLIC BLOOD PRESSURE: 60 MMHG

## 2020-07-17 DIAGNOSIS — C91.02 ACUTE LYMPHOBLASTIC LEUKEMIA (ALL) IN RELAPSE (H): ICD-10-CM

## 2020-07-17 DIAGNOSIS — Z94.81 S/P ALLOGENEIC BONE MARROW TRANSPLANT (H): Primary | ICD-10-CM

## 2020-07-17 DIAGNOSIS — C91.00 ALL (ACUTE LYMPHOBLASTIC LEUKEMIA OF INFANT) (H): Primary | ICD-10-CM

## 2020-07-17 DIAGNOSIS — C91.02 ACUTE LYMPHOBLASTIC LEUKEMIA (ALL) IN RELAPSE (H): Primary | ICD-10-CM

## 2020-07-17 DIAGNOSIS — Z94.81 S/P ALLOGENEIC BONE MARROW TRANSPLANT (H): ICD-10-CM

## 2020-07-17 DIAGNOSIS — C91.02 ALL (ACUTE LYMPHOID LEUKEMIA) IN RELAPSE (H): Primary | ICD-10-CM

## 2020-07-17 PROBLEM — R50.9 FEVER: Status: ACTIVE | Noted: 2020-07-17

## 2020-07-17 LAB
ABO + RH BLD: NORMAL
ABO + RH BLD: NORMAL
ALBUMIN SERPL-MCNC: 3.6 G/DL (ref 3.4–5)
ALP SERPL-CCNC: 60 U/L (ref 40–150)
ALT SERPL W P-5'-P-CCNC: 319 U/L (ref 0–70)
ANION GAP SERPL CALCULATED.3IONS-SCNC: 4 MMOL/L (ref 3–14)
AST SERPL W P-5'-P-CCNC: 112 U/L (ref 0–45)
BASOPHILS # BLD AUTO: 0 10E9/L (ref 0–0.2)
BASOPHILS NFR BLD AUTO: 0 %
BILIRUB SERPL-MCNC: 1.7 MG/DL (ref 0.2–1.3)
BLASTS # BLD: 0.4 10E9/L
BLASTS BLD QL SMEAR: 21.6 %
BLD GP AB SCN SERPL QL: NORMAL
BLD PROD TYP BPU: NORMAL
BLD UNIT ID BPU: 0
BLD UNIT ID BPU: 0
BLOOD BANK CMNT PATIENT-IMP: NORMAL
BLOOD PRODUCT CODE: NORMAL
BLOOD PRODUCT CODE: NORMAL
BPU ID: NORMAL
BPU ID: NORMAL
BUN SERPL-MCNC: 17 MG/DL (ref 7–30)
CALCIUM SERPL-MCNC: 8.7 MG/DL (ref 8.5–10.1)
CHLORIDE SERPL-SCNC: 105 MMOL/L (ref 94–109)
CO2 SERPL-SCNC: 27 MMOL/L (ref 20–32)
CREAT SERPL-MCNC: 0.81 MG/DL (ref 0.66–1.25)
DEPRECATED S PYO AG THROAT QL EIA: NEGATIVE
DIFFERENTIAL METHOD BLD: ABNORMAL
ELLIPTOCYTES BLD QL SMEAR: ABNORMAL
EOSINOPHIL # BLD AUTO: 0 10E9/L (ref 0–0.7)
EOSINOPHIL NFR BLD AUTO: 1.7 %
ERYTHROCYTE [DISTWIDTH] IN BLOOD BY AUTOMATED COUNT: 14.9 % (ref 10–15)
FLUAV+FLUBV RNA SPEC QL NAA+PROBE: NEGATIVE
FLUAV+FLUBV RNA SPEC QL NAA+PROBE: NEGATIVE
GFR SERPL CREATININE-BSD FRML MDRD: >90 ML/MIN/{1.73_M2}
GLUCOSE SERPL-MCNC: 95 MG/DL (ref 70–99)
HCT VFR BLD AUTO: 27.2 % (ref 40–53)
HGB BLD-MCNC: 8.7 G/DL (ref 13.3–17.7)
LAB SCANNED RESULT: NORMAL
LYMPHOCYTES # BLD AUTO: 1.3 10E9/L (ref 0.8–5.3)
LYMPHOCYTES NFR BLD AUTO: 75 %
MAGNESIUM SERPL-MCNC: 2 MG/DL (ref 1.6–2.3)
MCH RBC QN AUTO: 27.5 PG (ref 26.5–33)
MCHC RBC AUTO-ENTMCNC: 32 G/DL (ref 31.5–36.5)
MCV RBC AUTO: 86 FL (ref 78–100)
MONOCYTES # BLD AUTO: 0 10E9/L (ref 0–1.3)
MONOCYTES NFR BLD AUTO: 1.7 %
NEUTROPHILS # BLD AUTO: 0 10E9/L (ref 1.6–8.3)
NEUTROPHILS NFR BLD AUTO: 0 %
NUM BPU REQUESTED: 1
PHOSPHATE SERPL-MCNC: 4.3 MG/DL (ref 2.5–4.5)
PLATELET # BLD AUTO: 9 10E9/L (ref 150–450)
PLATELET # BLD EST: ABNORMAL 10*3/UL
POIKILOCYTOSIS BLD QL SMEAR: ABNORMAL
POTASSIUM SERPL-SCNC: 4 MMOL/L (ref 3.4–5.3)
PROT SERPL-MCNC: 7 G/DL (ref 6.8–8.8)
RBC # BLD AUTO: 3.16 10E12/L (ref 4.4–5.9)
RBC INCLUSIONS BLD: SLIGHT
RSV RNA SPEC NAA+PROBE: NEGATIVE
SODIUM SERPL-SCNC: 136 MMOL/L (ref 133–144)
SPECIMEN EXP DATE BLD: NORMAL
SPECIMEN SOURCE: NORMAL
SPECIMEN SOURCE: NORMAL
TRANSFUSION STATUS PATIENT QL: NORMAL
URATE SERPL-MCNC: 3.2 MG/DL (ref 3.5–7.2)
WBC # BLD AUTO: 1.7 10E9/L (ref 4–11)

## 2020-07-17 PROCEDURE — 25000128 H RX IP 250 OP 636: Mod: ZF

## 2020-07-17 PROCEDURE — 84550 ASSAY OF BLOOD/URIC ACID: CPT | Performed by: PHYSICIAN ASSISTANT

## 2020-07-17 PROCEDURE — 25000132 ZZH RX MED GY IP 250 OP 250 PS 637: Performed by: PHYSICIAN ASSISTANT

## 2020-07-17 PROCEDURE — 25000128 H RX IP 250 OP 636: Mod: ZF | Performed by: NURSE PRACTITIONER

## 2020-07-17 PROCEDURE — 25800030 ZZH RX IP 258 OP 636: Performed by: PHYSICIAN ASSISTANT

## 2020-07-17 PROCEDURE — 25000132 ZZH RX MED GY IP 250 OP 250 PS 637: Mod: ZF | Performed by: PHYSICIAN ASSISTANT

## 2020-07-17 PROCEDURE — P9037 PLATE PHERES LEUKOREDU IRRAD: HCPCS | Performed by: PHYSICIAN ASSISTANT

## 2020-07-17 PROCEDURE — 87040 BLOOD CULTURE FOR BACTERIA: CPT | Performed by: NURSE PRACTITIONER

## 2020-07-17 PROCEDURE — 86901 BLOOD TYPING SEROLOGIC RH(D): CPT | Performed by: PHYSICIAN ASSISTANT

## 2020-07-17 PROCEDURE — 87651 STREP A DNA AMP PROBE: CPT | Performed by: STUDENT IN AN ORGANIZED HEALTH CARE EDUCATION/TRAINING PROGRAM

## 2020-07-17 PROCEDURE — U0003 INFECTIOUS AGENT DETECTION BY NUCLEIC ACID (DNA OR RNA); SEVERE ACUTE RESPIRATORY SYNDROME CORONAVIRUS 2 (SARS-COV-2) (CORONAVIRUS DISEASE [COVID-19]), AMPLIFIED PROBE TECHNIQUE, MAKING USE OF HIGH THROUGHPUT TECHNOLOGIES AS DESCRIBED BY CMS-2020-01-R: HCPCS | Performed by: PHYSICIAN ASSISTANT

## 2020-07-17 PROCEDURE — 20600000 ZZH R&B BMT

## 2020-07-17 PROCEDURE — 86900 BLOOD TYPING SEROLOGIC ABO: CPT | Performed by: PHYSICIAN ASSISTANT

## 2020-07-17 PROCEDURE — 87631 RESP VIRUS 3-5 TARGETS: CPT | Performed by: PHYSICIAN ASSISTANT

## 2020-07-17 PROCEDURE — 86850 RBC ANTIBODY SCREEN: CPT | Performed by: PHYSICIAN ASSISTANT

## 2020-07-17 PROCEDURE — 80053 COMPREHEN METABOLIC PANEL: CPT | Performed by: PHYSICIAN ASSISTANT

## 2020-07-17 PROCEDURE — 25000128 H RX IP 250 OP 636: Performed by: PHYSICIAN ASSISTANT

## 2020-07-17 PROCEDURE — 40001204 ZZHCL STATISTIC STREP A RAPID: Performed by: STUDENT IN AN ORGANIZED HEALTH CARE EDUCATION/TRAINING PROGRAM

## 2020-07-17 PROCEDURE — 84100 ASSAY OF PHOSPHORUS: CPT | Performed by: PHYSICIAN ASSISTANT

## 2020-07-17 PROCEDURE — 25000132 ZZH RX MED GY IP 250 OP 250 PS 637

## 2020-07-17 PROCEDURE — 85025 COMPLETE CBC W/AUTO DIFF WBC: CPT | Performed by: NURSE PRACTITIONER

## 2020-07-17 PROCEDURE — 25000132 ZZH RX MED GY IP 250 OP 250 PS 637: Performed by: STUDENT IN AN ORGANIZED HEALTH CARE EDUCATION/TRAINING PROGRAM

## 2020-07-17 PROCEDURE — 25800030 ZZH RX IP 258 OP 636: Mod: ZF | Performed by: PEDIATRICS

## 2020-07-17 PROCEDURE — 83735 ASSAY OF MAGNESIUM: CPT | Performed by: PHYSICIAN ASSISTANT

## 2020-07-17 RX ORDER — MAGNESIUM HYDROXIDE 1200 MG/15ML
30 LIQUID ORAL 4 TIMES DAILY PRN
Status: DISCONTINUED | OUTPATIENT
Start: 2020-07-17 | End: 2020-07-20 | Stop reason: HOSPADM

## 2020-07-17 RX ORDER — HEPARIN SODIUM,PORCINE 10 UNIT/ML
3-6 VIAL (ML) INTRAVENOUS EVERY 24 HOURS
Status: DISCONTINUED | OUTPATIENT
Start: 2020-07-17 | End: 2020-07-20 | Stop reason: HOSPADM

## 2020-07-17 RX ORDER — ACETAMINOPHEN 325 MG/1
650 TABLET ORAL EVERY 4 HOURS PRN
Status: DISCONTINUED | OUTPATIENT
Start: 2020-07-17 | End: 2020-07-20 | Stop reason: HOSPADM

## 2020-07-17 RX ORDER — ACETAMINOPHEN 325 MG/1
650 TABLET ORAL ONCE
Status: COMPLETED | OUTPATIENT
Start: 2020-07-17 | End: 2020-07-17

## 2020-07-17 RX ORDER — HEPARIN SODIUM,PORCINE 10 UNIT/ML
VIAL (ML) INTRAVENOUS
Status: COMPLETED
Start: 2020-07-17 | End: 2020-07-17

## 2020-07-17 RX ORDER — DIPHENHYDRAMINE HYDROCHLORIDE 50 MG/ML
INJECTION INTRAMUSCULAR; INTRAVENOUS
Status: DISCONTINUED
Start: 2020-07-17 | End: 2020-07-17 | Stop reason: HOSPADM

## 2020-07-17 RX ORDER — ACETAMINOPHEN 325 MG/1
TABLET ORAL
Status: DISCONTINUED
Start: 2020-07-17 | End: 2020-07-17 | Stop reason: HOSPADM

## 2020-07-17 RX ORDER — CALCIUM CARBONATE 500 MG/1
500-1000 TABLET, CHEWABLE ORAL DAILY PRN
Status: DISCONTINUED | OUTPATIENT
Start: 2020-07-17 | End: 2020-07-20 | Stop reason: HOSPADM

## 2020-07-17 RX ORDER — DIPHENHYDRAMINE HYDROCHLORIDE 50 MG/ML
50 INJECTION INTRAMUSCULAR; INTRAVENOUS EVERY 6 HOURS PRN
Status: CANCELLED
Start: 2020-07-17

## 2020-07-17 RX ORDER — HEPARIN SODIUM (PORCINE) LOCK FLUSH IV SOLN 100 UNIT/ML 100 UNIT/ML
SOLUTION INTRAVENOUS
Status: DISCONTINUED
Start: 2020-07-17 | End: 2020-07-17 | Stop reason: HOSPADM

## 2020-07-17 RX ORDER — ACETAMINOPHEN 325 MG/1
650 TABLET ORAL EVERY 4 HOURS PRN
Status: DISCONTINUED | OUTPATIENT
Start: 2020-07-17 | End: 2020-07-17

## 2020-07-17 RX ORDER — SODIUM CHLORIDE 9 MG/ML
INJECTION, SOLUTION INTRAVENOUS CONTINUOUS
Status: DISCONTINUED | OUTPATIENT
Start: 2020-07-17 | End: 2020-07-18

## 2020-07-17 RX ORDER — OXYCODONE HYDROCHLORIDE 5 MG/1
5 TABLET ORAL EVERY 6 HOURS PRN
Status: DISCONTINUED | OUTPATIENT
Start: 2020-07-17 | End: 2020-07-17

## 2020-07-17 RX ORDER — OXYCODONE HYDROCHLORIDE 5 MG/1
5-10 TABLET ORAL EVERY 6 HOURS PRN
Status: DISCONTINUED | OUTPATIENT
Start: 2020-07-17 | End: 2020-07-20 | Stop reason: HOSPADM

## 2020-07-17 RX ORDER — HEPARIN SODIUM (PORCINE) LOCK FLUSH IV SOLN 100 UNIT/ML 100 UNIT/ML
5 SOLUTION INTRAVENOUS
Status: DISCONTINUED | OUTPATIENT
Start: 2020-07-17 | End: 2020-07-20 | Stop reason: HOSPADM

## 2020-07-17 RX ORDER — DIPHENHYDRAMINE HCL 25 MG
50 CAPSULE ORAL EVERY 6 HOURS PRN
Status: DISCONTINUED | OUTPATIENT
Start: 2020-07-17 | End: 2020-07-17

## 2020-07-17 RX ORDER — DIPHENHYDRAMINE HCL 25 MG
50 CAPSULE ORAL EVERY 6 HOURS PRN
Status: DISCONTINUED | OUTPATIENT
Start: 2020-07-17 | End: 2020-07-20

## 2020-07-17 RX ORDER — DIPHENHYDRAMINE HYDROCHLORIDE 50 MG/ML
25 INJECTION INTRAMUSCULAR; INTRAVENOUS ONCE
Status: COMPLETED | OUTPATIENT
Start: 2020-07-17 | End: 2020-07-17

## 2020-07-17 RX ORDER — HEPARIN SODIUM,PORCINE 10 UNIT/ML
3-6 VIAL (ML) INTRAVENOUS
Status: DISCONTINUED | OUTPATIENT
Start: 2020-07-17 | End: 2020-07-20 | Stop reason: HOSPADM

## 2020-07-17 RX ORDER — PANTOPRAZOLE SODIUM 40 MG/1
40 TABLET, DELAYED RELEASE ORAL DAILY
Status: DISCONTINUED | OUTPATIENT
Start: 2020-07-17 | End: 2020-07-20 | Stop reason: HOSPADM

## 2020-07-17 RX ORDER — DIPHENHYDRAMINE HYDROCHLORIDE 50 MG/ML
INJECTION INTRAMUSCULAR; INTRAVENOUS
Status: COMPLETED
Start: 2020-07-17 | End: 2020-07-17

## 2020-07-17 RX ORDER — NALOXONE HYDROCHLORIDE 0.4 MG/ML
.1-.4 INJECTION, SOLUTION INTRAMUSCULAR; INTRAVENOUS; SUBCUTANEOUS
Status: DISCONTINUED | OUTPATIENT
Start: 2020-07-17 | End: 2020-07-20 | Stop reason: HOSPADM

## 2020-07-17 RX ORDER — SULFAMETHOXAZOLE/TRIMETHOPRIM 800-160 MG
1 TABLET ORAL 2 TIMES DAILY
Status: COMPLETED | OUTPATIENT
Start: 2020-07-18 | End: 2020-07-19

## 2020-07-17 RX ORDER — HEPARIN SODIUM,PORCINE 10 UNIT/ML
3-6 VIAL (ML) INTRAVENOUS EVERY 24 HOURS
Status: DISCONTINUED | OUTPATIENT
Start: 2020-07-17 | End: 2020-07-17 | Stop reason: HOSPADM

## 2020-07-17 RX ORDER — CEFTRIAXONE 2 G/1
INJECTION, POWDER, FOR SOLUTION INTRAMUSCULAR; INTRAVENOUS
Status: DISCONTINUED
Start: 2020-07-17 | End: 2020-07-17 | Stop reason: HOSPADM

## 2020-07-17 RX ORDER — DIPHENHYDRAMINE HYDROCHLORIDE 50 MG/ML
50 INJECTION INTRAMUSCULAR; INTRAVENOUS EVERY 6 HOURS PRN
Status: DISCONTINUED | OUTPATIENT
Start: 2020-07-17 | End: 2020-07-17

## 2020-07-17 RX ORDER — TRAMADOL HYDROCHLORIDE 50 MG/1
50 TABLET ORAL EVERY 6 HOURS PRN
Status: DISCONTINUED | OUTPATIENT
Start: 2020-07-17 | End: 2020-07-20 | Stop reason: HOSPADM

## 2020-07-17 RX ORDER — DIPHENHYDRAMINE HYDROCHLORIDE 50 MG/ML
50 INJECTION INTRAMUSCULAR; INTRAVENOUS EVERY 6 HOURS PRN
Status: DISCONTINUED | OUTPATIENT
Start: 2020-07-17 | End: 2020-07-17 | Stop reason: HOSPADM

## 2020-07-17 RX ADMIN — ACETAMINOPHEN 650 MG: 325 TABLET, FILM COATED ORAL at 08:00

## 2020-07-17 RX ADMIN — SODIUM CHLORIDE, PRESERVATIVE FREE 50 UNITS: 5 INJECTION INTRAVENOUS at 10:26

## 2020-07-17 RX ADMIN — OXYCODONE HYDROCHLORIDE 5 MG: 5 TABLET ORAL at 18:54

## 2020-07-17 RX ADMIN — SODIUM CHLORIDE 50 ML: 9 INJECTION, SOLUTION INTRAVENOUS at 08:46

## 2020-07-17 RX ADMIN — CEFEPIME 2 G: 2 INJECTION, POWDER, FOR SOLUTION INTRAVENOUS at 17:00

## 2020-07-17 RX ADMIN — MICAFUNGIN SODIUM 50 MG: 10 INJECTION, POWDER, LYOPHILIZED, FOR SOLUTION INTRAVENOUS at 14:50

## 2020-07-17 RX ADMIN — DIPHENHYDRAMINE HYDROCHLORIDE 50 MG: 50 INJECTION, SOLUTION INTRAMUSCULAR; INTRAVENOUS at 08:04

## 2020-07-17 RX ADMIN — ACETAMINOPHEN 650 MG: 325 TABLET, FILM COATED ORAL at 07:52

## 2020-07-17 RX ADMIN — HEPARIN, PORCINE (PF) 10 UNIT/ML INTRAVENOUS SYRINGE 50 UNITS: at 12:32

## 2020-07-17 RX ADMIN — OXYCODONE HYDROCHLORIDE 5 MG: 5 TABLET ORAL at 13:54

## 2020-07-17 RX ADMIN — PANTOPRAZOLE SODIUM 40 MG: 40 TABLET, DELAYED RELEASE ORAL at 14:45

## 2020-07-17 RX ADMIN — DIPHENHYDRAMINE HYDROCHLORIDE 25 MG: 50 INJECTION, SOLUTION INTRAMUSCULAR; INTRAVENOUS at 12:31

## 2020-07-17 RX ADMIN — DIPHENHYDRAMINE HYDROCHLORIDE 25 MG: 50 INJECTION INTRAMUSCULAR; INTRAVENOUS at 12:31

## 2020-07-17 RX ADMIN — Medication 50 UNITS: at 10:26

## 2020-07-17 RX ADMIN — SODIUM CHLORIDE: 9 INJECTION, SOLUTION INTRAVENOUS at 14:00

## 2020-07-17 RX ADMIN — SODIUM CHLORIDE 100 ML: 9 INJECTION, SOLUTION INTRAVENOUS at 08:46

## 2020-07-17 RX ADMIN — SODIUM CHLORIDE: 9 INJECTION, SOLUTION INTRAVENOUS at 22:16

## 2020-07-17 RX ADMIN — CEFEPIME 2 G: 2 INJECTION, POWDER, FOR SOLUTION INTRAVENOUS at 08:40

## 2020-07-17 RX ADMIN — OXYCODONE HYDROCHLORIDE 5 MG: 5 TABLET ORAL at 22:16

## 2020-07-17 RX ADMIN — Medication 50 UNITS: at 12:32

## 2020-07-17 ASSESSMENT — ACTIVITIES OF DAILY LIVING (ADL)
RETIRED_EATING: 0-->INDEPENDENT
COGNITION: 0 - NO COGNITION ISSUES REPORTED
AMBULATION: 0-->INDEPENDENT
TOILETING: 0-->INDEPENDENT
TRANSFERRING: 0-->INDEPENDENT
FALL_HISTORY_WITHIN_LAST_SIX_MONTHS: NO
SWALLOWING: 0-->SWALLOWS FOODS/LIQUIDS WITHOUT DIFFICULTY
DRESS: 0-->INDEPENDENT
RETIRED_COMMUNICATION: 0-->UNDERSTANDS/COMMUNICATES WITHOUT DIFFICULTY
BATHING: 0-->INDEPENDENT

## 2020-07-17 ASSESSMENT — MIFFLIN-ST. JEOR: SCORE: 1579.38

## 2020-07-17 NOTE — DISCHARGE SUMMARY
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 He had a BMBx/LP/ITC performed at [facility] 7/14 and we are awaiting results. He completed apheresis collection in the [facility] last month (6/2020). He has been receiving bridging chemotherapy with oral 6MP, methotrexate and ruxolitinib.  He stopped all oral chemotherapy on 7/12. His non-myeloablative chemotherapy (7/21-7/24) is currently scheduled outpatient in the [facility] and CAR-T infusion (7/27) is currently scheduled for inpatient on unit 4 with admit 7/26.      Isael has a history of significant spinal headaches in the past but no headache reported following his LP 7/15. He received oral caffeine before his procedure and he took one dose again last night. He was also given a 4-day course of gabapentin but Isael did not take it because he was feeling well. He was given tramadol prn for bone marrow biopsy pain but did not have any discomfort and did not take any.  Approximately two weeks ago he developed a fever of 102F with sore throat, headache, emesis x 1 and lightheadedness. No respiratory symptoms. He was admitted to [facility] for 3-4 days and then discharged. He said Covid and other tests were negative. He was told it was likely a virus. He has been afebrile and generally clinically well since dischage. He has developed mouth sores but he said it's mild and he manages it by rinsing his mouth. He decided to stop all of his meds at the time he stopped his oral chemotherapy on 7/12. He has been c/o acid reflux and has been using TUMS  but with little relief. Occasional nausea without emesis, using benadryl prn.  Previously he has not required blood product premedications but recently developed hives of the neck and face during his last four platelet transfusions.  He now requires platelet premedications with tylenol and benadryl. He said they sometimes repeat his benadryl dose midway through his transfusion. He never had tongue swelling or respiratory compromise. His last platelet transfusion was on 7/13 and his last RBC transfusion was approximately two weeks ago.     Today, Artemio presented to [facility] for COVID testing, Influenza testing, and a platelet transfusion. He was found to be febrile to 100.8F prior to his platelet transfusion. Blood Cultures and Cefepime were given in the [facility]. Artemio reports that he has been feeling well over the past few days. He continues to report mouth and throat pain. Denies fever, runny nose or nasal congestion, cough, constipation or diarrhea, rash, or bleeding.      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, now in relapse  Aaron-Parkinson-White syndrome  Vitamin D deficieny  Spinal headache  Port-a-cath placement (6/10/20)     Past Surgical History  Past Surgical History:   Procedure Laterality Date     BONE MARROW BIOPSY, BONE SPECIMEN, NEEDLE/TROCAR Right 11/27/2018    Procedure: bone marrow biopsy;  Surgeon: Jacqueline Fitzgerald NP;  [facility]      BONE MARROW BIOPSY, BONE SPECIMEN, NEEDLE/TROCAR N/A 2/8/2019    Procedure: BIOPSY BONE MARROW;  Surgeon: Lisa Workman NP;  [facility]: [facility]      BONE MARROW BIOPSY, BONE SPECIMEN, NEEDLE/TROCAR N/A 5/16/2019    Procedure: BIOPSY, BONE MARROW;  Surgeon: Lilia López APRN CNP;  Location: [facility]     BONE MARROW BIOPSY, BONE SPECIMEN, NEEDLE/TROCAR N/A 11/7/2019    Procedure: Bone marrow biopsy;  Surgeon: Jacqueline Shin NP;   [facility]: [facility]      BONE MARROW BIOPSY, BONE SPECIMEN, NEEDLE/TROCAR Right 6/10/2020    Procedure: BIOPSY, BONE MARROW;  Surgeon: Candido Han PA-C;  [facility]: [facility]      ESOPHAGOSCOPY, GASTROSCOPY, DUODENOSCOPY (EGD), COMBINED N/A 2/22/2019    Procedure: Upper endoscopy with biopsy;  Surgeon: Magdalene Hobson MD;  [facility]: [facility]      INSERT CATHETER VASCULAR ACCESS APHERESIS CHILD N/A 6/10/2020    Procedure: Large Bore Double Lumen NON TUNNELED Apheresis Catheter placement;  Surgeon: Link Rios PA-C;  [facility]: [facility]      INSERT CATHETER VASCULAR ACCESS DOUBLE LUMEN CHILD N/A 10/26/2018    Procedure: double lumen tunneled line placement;  Surgeon: Rex Valente MD;  [facility]: [facility]      INSERT PORT VASCULAR ACCESS N/A 6/10/2020    Procedure: Port placement;  Surgeon: Link Rios PA-C;  [facility]      IR CHEST PORT PLACEMENT > 5 YRS OF AGE  6/10/2020     IR CVC NON TUNNEL LINE REMOVAL  6/12/2020     IR CVC NON TUNNEL PLACEMENT  6/10/2020     IR CVC TUNNEL REMOVAL LEFT  2/8/2019     IR PORT REMOVAL LEFT  5/16/2019     O CLAVICLE LEFT Left     fracture with surgical repair and plate/screws     ORTHOPEDIC SURGERY      femur     REMOVE CATHETER VASCULAR ACCESS N/A 2/8/2019    Procedure: Tunneled line removal;  Surgeon: Krystal Esparza MD;  [facility]      REMOVE PORT VASCULAR ACCESS N/A 5/16/2019    Procedure: REMOVAL, VASCULAR ACCESS PORT;  Surgeon: Link Rios PA-C;  [facility]     Family History:  Maternal grandfather: Thrombocytopenia  Paternal grandfather: Prostate cancer, type 2 diabetes.     Social History: His mother (Mervat) is going to be relocating temporarily from [place] to be his full-time caregiver. Lodging through [facility]. His father (Dangelo), stepmother (Camryn) and paternal grandparents are all involved in his care. Isael has authorized communication  with all of them.     Discharge Medications     Review of your medicines      START taking      Dose / Directions   chlorhexidine 0.12 % solution  Commonly known as:  PERIDEX  Used for:  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)      Dose:  15 mL  Swish and spit 15 mLs in mouth 2 times daily  Quantity:  473 mL  Refills:  3        CONTINUE these medicines which may have CHANGED, or have new prescriptions. If we are uncertain of the size of tablets/capsules you have at home, strength may be listed as something that might have changed.      Dose / Directions   sulfamethoxazole-trimethoprim 800-160 MG tablet  Commonly known as:  BACTRIM DS  This may have changed:  additional instructions  Used for:  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), S/P allogeneic bone marrow transplant (H), At risk for infection      Dose:  1 tablet  Take 1 tablet by mouth Every Mon, Tues two times daily  Quantity:  20 tablet  Refills:  3        CONTINUE these medicines which have NOT CHANGED      Dose / Directions   allopurinol 300 MG tablet  Commonly known as:  ZYLOPRIM  Used for:  ALL (acute lymphoid leukemia) in relapse (H)      Dose:  150 mg  Start taking on:  July 21, 2020  Take 0.5 tablets (150 mg) by mouth 3 times daily for 7 days  Quantity:  11 tablet  Refills:  0     calcium carbonate 500 MG chewable tablet  Commonly known as:  TUMS      Dose:  1-2 chew tab  Take 1-2 chew tab by mouth daily as needed for heartburn  Refills:  0     diphenhydrAMINE 50 MG capsule  Commonly known as:  BENADRYL  Used for:  ALL (acute lymphoid leukemia) in relapse (H)      Dose:  50 mg  Take 1 capsule (50 mg) by mouth every 6 hours as needed for sleep or other (Nausea)  Quantity:  20 capsule  Refills:  0     granisetron 1 MG tablet  Commonly known as:  KYTRIL  Used for:  ALL (acute lymphoid leukemia) in relapse (H)      Dose:  1 mg  Take 1 tablet (1 mg) by mouth 2 times daily for 14 days  Quantity:  28 tablet  Refills:  0     levofloxacin  500 MG tablet  Commonly known as:  LEVAQUIN  Used for:  ALL (acute lymphoid leukemia) in relapse (H)      Dose:  500 mg  Take 1 tablet (500 mg) by mouth daily for 14 days  Quantity:  14 tablet  Refills:  0     LORazepam 0.5 MG tablet  Commonly known as:  ATIVAN  Used for:  ALL (acute lymphoid leukemia) in relapse (H)      Dose:  0.5 mg  Take 1 tablet (0.5 mg) by mouth every 6 hours as needed for nausea  Quantity:  20 tablet  Refills:  0     melatonin 5 MG tablet      Dose:  5-10 mg  Take 5-10 mg by mouth nightly as needed for sleep  Refills:  0     oxyCODONE 5 MG tablet  Commonly known as:  ROXICODONE  Used for:  ALL (acute lymphoid leukemia) in relapse (H), Mucositis      Dose:  5 mg  Take 1 tablet (5 mg) by mouth every 6 hours as needed for severe pain (Cancer related pain)  Quantity:  30 tablet  Refills:  0     pantoprazole 40 MG EC tablet  Commonly known as:  PROTONIX  Used for:  S/P allogeneic bone marrow transplant (H), Nausea, On total parenteral nutrition      Dose:  40 mg  Take 1 tablet (40 mg) by mouth daily  Quantity:  30 tablet  Refills:  1     traMADol 50 MG tablet  Commonly known as:  ULTRAM      Dose:  50 mg  Take 50 mg by mouth every 6 hours as needed for pain (mild or anticipated)  Refills:  0     valACYclovir 1000 mg tablet  Commonly known as:  Valtrex  Used for:  ALL (acute lymphoid leukemia) in relapse (H)      Dose:  1,000 mg  Take 1 tablet (1,000 mg) by mouth 3 times daily for 28 days  Quantity:  84 tablet  Refills:  0           Where to get your medicines      These medications were sent to [place], [place] - [address]  [address] [address]    Phone:  [phone]     chlorhexidine 0.12 % solution          Allergies    Allergies   Allergen Reactions     Blood Transfusion Related (Informational Only) Other (See Comments)     Patient has a history of a clinically significant antibody against RBC antigens.  A delay in compatible RBCs may  occur.Stem cell transplant patient.  Give type O RBCs.  Requires Benadryl as premed for platelets for history of hives     Voriconazole Photosensitivity     Significant rash - do not rechallenge     Discharge Physical Exam   BP 97/64   Pulse 60   Temp 97.8  F (36.6  C) (Axillary)   Resp 16   Wt 58.2 kg (128 lb 4.9 oz)   SpO2 99%   BMI 18.81 kg/m    GEN: Lying in bed. Pleasant and cooperative. NAD.  HEENT: Head NC/AT. Sclerae clear, PERRL, EOMs intact,  nares patent, OP with no obvious lesions, MMM, receding gumline with erythema near the right lower lateral incisor, no active bleeding.  NECK: Supple. Small right sided cervical lymph node is palpable and painful to the touch.   CARD: Regular rate and rhythm. Normal S1 and S2, no murmurs, rubs or gallops. Cap refill < 2 sec  RESP: Lungs clear to auscultation bilaterally. No increased work of breathing. No adventitious lung sounds.  ABD: Non-distended, non-tender, no organomegaly. Normal bowel sounds  EXTREM: No edema noted  SKIN: No rashes, bruising or petechiae.   NEURO: No focal deficits.  ACCESS: Port upper right chest, CVC in left upper chest, c/d/i.     Discharge Labwork: See EPIC for full results, pertinent values include WBC 1.4 with ANC 0, plts 62 after transfusion, , .       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 CD19 positive (dim). He is set to begin CAR-T (Kymriah) per [id number] on July 21, 2020.      He was admitted from [facility] on 7/17 due to neutropenic fevers. Cefepime initiated and blood cultures obtained.  Cultures are negative to date and COVID test is negative.  He has been afebrile since 7/17 at 2100 and cefepime was discontinue on 7/19 at 2100.   Ongoing poor oral intake due to throat and gingival pain, on IVF.   He had a CVC placed 7/20 without  incident and was discharged in stable condition.      ALL and BMT: High risk B cell ALL (CNS negative) + JAK2 activation.  - Related bone marrow transplant 11/2/18  - Relapsed 6/5/2020, 19 months status post matched related BMT per [id number] (cytoxan/TBI arm) with CD19 (dim) relapsed b-cell lymphoblastic leukemia.  - Completed apheresis collection in the [facility] (6/2020).   - He has been receiving bridging chemotherapy with oral 6MP, methotrexate and ruxolitinib. All oral chemotherapy stopped on 7/12.   - BM this week showed 75% involvement with leukemia. CSF was negative.   - Lymphodepleting chemotherapy per [id number] with cytoxan and fludarabine planned for 7/21-7/24 and CAR-T infusion (7/27); the chemotherapy is planned for outpatient, with an admission for the cell infusion.      Heme:   # Pancytopenia secondary to chemotherapy:  - Transfuse for hemoglobin < 7, platelets < 10,000, <50,000 pre-procedure for CVC placement on 7/20.  - New history of significant hives with platelet transfusions x last 4 transfusions. Premedicate with tylenol, hydrocortisone, and benadryl for platelets.  - No premedications needed for RBC transfusions. Last RBC transfusion approximately two weeks ago.  - No GCSF     Infectious Disease:  # Febrile Neutropenia: Afebrile for 72 hours and off IV cefepime since 2100 on 7/19.   Blood cultures no growth to date at time of discharge.      # Risk for infection given immunocompromised status: BMT ID labs pending   Active: See above  Prophylaxis:                                                                      - Viral prophylaxis: HD Valtrex starting 7/21 to continue through Day +30  - Fungal prophylaxis: Started  Micafungin at admission and continue through Day +30. History of photoxic drug eruption with voriconazole, avoid use.   - Bacterial prophylaxis: Cefepime with fever (see above)  - PCP prophylaxis: Bactrim to begin day +28     FEN/Renal:   # Risk for malnutrition: Difficulty  with eating secondary to throat pain.   - Monitor nutritional intake  - Age appropriate diet   - Consult dietician on 7/20 with plans to start TPN tonight and adjust in the coming days.  Follow up appointment on 7/21 with Renetta scheduled     # Risk for electrolyte abnormalities:  - Check daily electrolytes     # Risk for renal dysfunction and fluid overload:   - Monitor I/O's and daily weights  -  ml/min     # Risk for TLS:  - Start Allopurinol TID on 7/21     Pulmonary:  # Risk for pulmonary insufficiency: No current concerns. Normal chest and sinus CTs during work-up.      Cardiovascular:  Aaron-Parkinson-White Syndrome: diagnosed upon syncope in 02/2018. Asymptomatic, no interventions to date. No current concerns.  - EKG 7/15 revealed WPW with normal sinus rhythm.   - Echocardiogram 7/15 showed LVEF of 62%  - Cardiology to decide if they want to assess him while inpatient.      GI:   # Nausea:  - Scheduled medications: Kytril BID starting 7/21  - PRN medications: Benadryl PRN     # Acid reflux:  Protonix once daily. Continue TUMS prn.     # Transaminitis:  Possibly related to recent chemotherapy. Trending with repeat CMP on 7/22.  May warrant RUQ US to further assess persistent transamnitis.       Neuro:  # Mucositis/pain: Throat pain improving  - Oxycodone 5 mg PO every 6 hours PRN   - Tramadol prn     Oral Hygiene:   # Receding gums, risk for infection  - Continue chlorhexidine mouth wash for symptomatic control     Derm:  Dermatology consultation 7/24. No current rash. He is no longer using creams.       Fertility:  Sperm collected pre chemotherapy for fertility preservation    Disposition: Artemio will present to the [facility] on 7/21 at 830 am chemotherapy and 9 am for follow-up & exam with BENJA Talamantes     Pending Labwork: None  Upcoming Infusion Appointments: 7/21/20 830 am for chemotherapy infusion  PT/OT/ST Outpatient Recommendations: None  Primary BMT MD & RN Coordinator: Jaquelin Senior  MD and Adriano Minor RN     The above plan of care was developed by and communicated to me by the Pediatric BMT attending physician, Candido Joshua.    John Núñez MD  Pediatric BMT Hospitalist     BMT Attending Note:    I evaluated and examined Artemio Nino today, and reviewed the vital signs, medications and laboratory data.  This was discussed with the team, as well as the family. He had a line placement this AM, and will be discharged for follow-up tomorrow. He is to begin his chemotherapy as an outpatient, to return for admission to receive the CAR T infusion. As he is not eating/drinking much, we made arrangements to begin support with TPN at home. He has remained afebrile, with no positive cultures.     The decision was made that a discharge today would be appropriate.  The necessary coordination was done, and >30 minutes of time was required to accomplish this.  Follow-up has been arranged for the family as deemed appropriate.    Candido Joshua MD  Professor, Division of Blood and Marrow Transplantation  Department of Pediatrics  [phone]

## 2020-07-17 NOTE — H&P
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 He had a BMBx/LP/ITC performed at [facility] 7/14 and we are awaiting results. He completed apheresis collection in the [facility] last month (6/2020). He has been receiving bridging chemotherapy with oral 6MP, methotrexate and ruxolitinib.  He stopped all oral chemotherapy on 7/12. His non-myeloablative chemotherapy (7/21-7/24) is currently scheduled outpatient in the [facility] and CAR-T infusion (7/27) is currently scheduled for inpatient on unit 4 with admit 7/26.      Isael has a history of significant spinal headaches in the past but no headache reported following his LP 7/15. He received oral caffeine before his procedure and he took one dose again last night. He was also given a 4-day course of gabapentin but Isael did not take it because he was feeling well. He was given tramadol prn for bone marrow biopsy pain but did not have any discomfort and did not take any.  Approximately two weeks ago he developed a fever of 102F with sore throat, headache, emesis x 1 and lightheadedness. No respiratory symptoms. He was admitted to [facility] for 3-4 days and then discharged. He said Covid and other tests were negative. He was told it was likely a virus. He has been afebrile and generally clinically well since dischage. He has developed mouth sores but he said it's mild and he manages it by rinsing his mouth. He decided to stop all of his meds at the time he stopped his oral chemotherapy on 7/12. He has been c/o acid reflux and has been using TUMS but with little relief. Occasional nausea without emesis, using benadryl prn.  Previously he has not  required blood product premedications but recently developed hives of the neck and face during his last four platelet transfusions.  He now requires platelet premedications with tylenol and benadryl. He said they sometimes repeat his benadryl dose midway through his transfusion. He never had tongue swelling or respiratory compromise. His last platelet transfusion was on 7/13 and his last RBC transfusion was approximately two weeks ago.     Today, Artemio presented to [facility] for COVID testing, Influenza testing, and a platelet transfusion. He was found to be febrile to 100.8F prior to his platelet transfusion. Blood Cultures and Cefepime were given in the [facility]. Artemio reports that he has been feeling well over the past few days. He continues to report mouth and throat pain. Denies fever, runny nose or nasal congestion, cough, constipation or diarrhea, rash, or bleeding. He developed hives on his chin and a lump on his head during the platelet transfusion. He received tylenol and benadryl premeds and a benadryl midmed. His hives resolved on his chin and the lump remained at the time of admission.      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, now in relapse  Aaron-Parkinson-White syndrome  Vitamin D deficieny  Spinal headache  Port-a-cath placement (6/10/20)     Past Surgical History    Past Surgical History:   Procedure Laterality Date     BONE MARROW BIOPSY, BONE SPECIMEN, NEEDLE/TROCAR Right 11/27/2018    Procedure: bone marrow biopsy;  Surgeon: Jacqueline Fitzgerald NP;  [facility]: [facility]      BONE MARROW BIOPSY, BONE SPECIMEN, NEEDLE/TROCAR N/A 2/8/2019    Procedure: BIOPSY BONE MARROW;  Surgeon: Lisa Workman NP;  [facility]: [facility]      BONE MARROW BIOPSY, BONE SPECIMEN, NEEDLE/TROCAR N/A 5/16/2019    Procedure: BIOPSY, BONE MARROW;  Surgeon: Lilia López APRN CNP;  Location: [facility]      BONE MARROW BIOPSY, BONE SPECIMEN, NEEDLE/TROCAR N/A 11/7/2019    Procedure: Bone marrow biopsy;  Surgeon: Jacqueline Shin NP;  Location: [facility]      BONE MARROW BIOPSY, BONE SPECIMEN, NEEDLE/TROCAR Right 6/10/2020    Procedure: BIOPSY, BONE MARROW;  Surgeon: Candido Han PA-C;  [facility]: [facility]      ESOPHAGOSCOPY, GASTROSCOPY, DUODENOSCOPY (EGD), COMBINED N/A 2/22/2019    Procedure: Upper endoscopy with biopsy;  Surgeon: Magdalene Hobson MD;  [facility]: [facility]      INSERT CATHETER VASCULAR ACCESS APHERESIS CHILD N/A 6/10/2020    Procedure: Large Bore Double Lumen NON TUNNELED Apheresis Catheter placement;  Surgeon: Link Rios PA-C;  [facility]: [facility]      INSERT CATHETER VASCULAR ACCESS DOUBLE LUMEN CHILD N/A 10/26/2018    Procedure: double lumen tunneled line placement;  Surgeon: Rex Valente MD;  [facility]: [facility]      INSERT PORT VASCULAR ACCESS N/A 6/10/2020    Procedure: Port placement;  Surgeon: Link Rios PA-C;  [facility]: [facility]      IR CHEST PORT PLACEMENT > 5 YRS OF AGE  6/10/2020     IR CVC NON TUNNEL LINE REMOVAL  6/12/2020     IR CVC NON TUNNEL PLACEMENT  6/10/2020     IR CVC TUNNEL REMOVAL LEFT  2/8/2019     IR PORT REMOVAL LEFT  5/16/2019     O CLAVICLE LEFT Left     fracture with surgical repair and plate/screws     ORTHOPEDIC SURGERY      femur     REMOVE CATHETER VASCULAR ACCESS N/A 2/8/2019    Procedure: Tunneled line removal;  Surgeon: Krystal Esparza MD;  [facility]: [facility]      REMOVE PORT VASCULAR ACCESS N/A 5/16/2019    Procedure: REMOVAL, VASCULAR ACCESS PORT;  Surgeon: Link Rios PA-C;  [facility]: [facility]     Family History:  Maternal grandfather: Thrombocytopenia  Paternal grandfather: Prostate cancer, type 2 diabetes.     Social History: His mother (Mervat) is going to be relocating temporarily from [place] to be his full-time caregiver. Lodging through [facility]. His  father (Dangelo), stepmother (Camryn) and paternal grandparents are all involved in his care. Isael has authorized communication with all of them.     Medications  No current facility-administered medications on file prior to encounter.   sulfamethoxazole-trimethoprim (BACTRIM DS/SEPTRA DS) 800-160 MG per tablet, Take 1 tablet by mouth Every Mon, Tues two times daily (Patient taking differently: Take 1 tablet by mouth Every Mon, Tues two times daily Missed dosing on 7/13 & 14 - instructed to take on Saturday and Sunday (7/18 & 19) prior to initiating chemo, plan to resume again on day +28 (8/24/2020))     Allergies      Allergies   Allergen Reactions     Blood Transfusion Related (Informational Only) Other (See Comments)     Patient has a history of a clinically significant antibody against RBC antigens.  A delay in compatible RBCs may occur.Stem cell transplant patient.  Give type O RBCs.  Requires Benadryl as premed for platelets for history of hives     Voriconazole Photosensitivity     Significant rash - do not rechallenge       Physical Exam   Temp:  [98.7  F (37.1  C)-100.8  F (38.2  C)] 98.7  F (37.1  C)  Pulse:  [62-77] 73  Resp:  [16] 16  BP: ()/(54-66) 86/54  SpO2:  [98 %-99 %] 98 %  Wt 58.4 kg (128 lb 12 oz)   BMI 18.87 kg/m      GEN: Lying in bed. Pleasant and cooperative. NAD  HEENT: Head NC, lump on his scalp following platelet transfusion in the clinic. sclerae clear, PERRL, EOMs intact,  nares patent, OP with small scattered lesions with difficulty opening his mouth due to the sores in his throat, MMM  NECK: Supple. No cervical lymphadenopathy  CARD: Regular rate and rhythm. Normal S1 and S2, no murmurs, rubs or gallops. Cap refill < 2 sec  RESP: Lungs Clear to auscultation bilaterally. No increased work of breathing. No adventitious lung sounds.  ABD: Non-distended, non-tender, no organomegaly. Normal bowel sounds  EXTREM: No edema noted  SKIN: No rashes, bruising or petechiae. Port upper  right chest, accessed.  NEURO: No focal deficits  ACCESS: Port upper right chest     Labs  Results for orders placed or performed in visit on 07/17/20   Transfusion reaction hives     Status: None (In process)   Result Value Ref Range    TRX Interpretation PENDING    Results for orders placed or performed in visit on 07/17/20   CBC with platelets differential     Status: Abnormal   Result Value Ref Range    WBC 1.7 (L) 4.0 - 11.0 10e9/L    RBC Count 3.16 (L) 4.4 - 5.9 10e12/L    Hemoglobin 8.7 (L) 13.3 - 17.7 g/dL    Hematocrit 27.2 (L) 40.0 - 53.0 %    MCV 86 78 - 100 fl    MCH 27.5 26.5 - 33.0 pg    MCHC 32.0 31.5 - 36.5 g/dL    RDW 14.9 10.0 - 15.0 %    Platelet Count 9 (LL) 150 - 450 10e9/L    Diff Method Manual Differential     % Neutrophils 0.0 %    % Lymphocytes 75.0 %    % Monocytes 1.7 %    % Eosinophils 1.7 %    % Basophils 0.0 %    % Blasts 21.6 %    Absolute Neutrophil 0.0 (LL) 1.6 - 8.3 10e9/L    Absolute Lymphocytes 1.3 0.8 - 5.3 10e9/L    Absolute Monocytes 0.0 0.0 - 1.3 10e9/L    Absolute Eosinophils 0.0 0.0 - 0.7 10e9/L    Absolute Basophils 0.0 0.0 - 0.2 10e9/L    Absolute Blasts 0.4 (H) 0 10e9/L    Poikilocytosis Moderate     RBC Fragments Slight     Elliptocytes Moderate     Platelet Estimate Decreased    Platelets prepare order unit     Status: None   Result Value Ref Range    Blood Component Type PLT Pheresis     Units Ordered 1    Blood component     Status: None   Result Value Ref Range    Unit Number [id number]     Blood Component Type PlateletPheresis LeukoReduced Irradiated     Division Number 00     Status of Unit No longer available 07/17/2020 0847     Blood Product Code [id number]     Unit Status RET    Blood component     Status: None   Result Value Ref Range    Unit Number [id number]     Blood Component Type PlateletPheresis LeukoReduced Irradiated     Division Number 00     Status of Unit Released to care unit 07/17/2020 1102     Blood Product Code [id number]     Unit Status ISS     Influenza A and B and RSV PCR     Status: None   Result Value Ref Range    Specimen Description Left Nares     Influenza A PCR Negative NEG^Negative    Influenza B PCR Negative NEG^Negative    Resp Syncytial Virus Negative NEG^Negative   Blood culture     Status: None (Preliminary result)    Specimen: Blood    Port   Result Value Ref Range    Specimen Description Blood Port     Special Requests Received in aerobic bottle only     Culture Micro PENDING      Assessment and Plan:  Artemio Nino is a  20 year old male with 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 CD19 positive (dim). He is set to begin CAR-T (Kymriah) per [id number] on July 21, 2020.      Admitted from [facility] due to fever. Cefepime initiated and blood cultures obtained. Developed hives on his chin during platelet transfusion and received benadryl.       ALL and BMT: High risk B cell ALL (CNS negative) + JAK2 activation.  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  - Completed apheresis collection in the [facility] (6/2020).   - BMBx/LP/ITC performed at [facility] yesterday (7/14) and we are awaiting results.   - He has been receiving bridging chemotherapy with oral 6MP, methotrexate and ruxolitinib.    - All oral chemotherapy stopped on 7/12.   - Exit consultation 7/16 with Dr. Senior.  - Line access currently with a port (placed 6/10). Double burns line placement scheduled for 7/20.  - Both non-myeloablative chemotherapy per MT2017-45 with cytoxan and fludarabine (7/21-7/24) and CAR-T infusion (7/27) originally planned for outpatient.     GVHD:  No signs of GvHD to date.     FEN/Renal:   # Risk for malnutrition:  - Monitor nutritional intake  - Age  appropriate diet      # Risk for electrolyte abnormalities:  - Check daily electrolytes     # Risk for renal dysfunction and fluid overload: workup UA: RBC 6, protein albumin 30. Asymptomatic  - Monitor I/O's and daily weights  -  ml/min     # Risk for TLS:  - Start Allopurinol TID on 7/21     Pulmonary:  # Risk for pulmonary insufficiency: No current concerns. 7/15/20, normal chest CT. Sinus CT showed no evidence of acute sinusitis. Interval improved polypoid mucosal thickening of the maxillary sinus with complete resolution on left.  - History of pneumomediastinum: finding upon work up Chest CT (10/22/19), asymptomatic. Per radiology, likely benign, perhaps transient. Now Resolved.     Cardiovascular:  Aaron-Parkinson-White Syndrome: diagnosed upon syncope in 02/2018. Asymptomatic, no interventions to date. No current concerns.  - EKG 7/15 revealed WPW with normal sinus rhythm.   - Echocardiogram 7/15 showed LVEF of 62%  - Cardiology to decide if they want to assess him while inpatient.  - BPs 80's/60's in clinic. Running NS at maintenance on admission     Heme:   # Pancytopenia secondary to chemotherapy:  - Transfuse for hemoglobin < 7, platelets < 10,000.  - - New history of significant hives with platelet transfusions x last 4 transfusions. Premedicate with tylenol and benadryl for platelets. May also need mid-med.    - Platelet transfusion today due to CVC line placement scheduled for Monday 7/20. Mild reaction today with hives on his chin. Received additional benadryl today.  - Transfusion reaction evaluation ordered.  - No premedications needed for RBC transfusions. Last RBC transfusion approximately two weeks ago.  - No GCSF     # Coags: Workup-  INR 1.18, PTT 35 (7/15)     Infectious Disease:  # Febrile Neutropenia: Febrile to 100.8F in clinic today (7/17)  - Cefepime IV initiated in clinic and blood cultures obtained.  - Blood culture q 24 hr PRN with fevers > 100.4F     # Risk for infection given  immunocompromised status: BMT ID labs pending   Active: Influenza A and B and RSV neg (7/17). Covind pending.   Prophylaxis:                                                                      - Viral prophylaxis: HD Valtrex starting 7/21 to continue through Day +30  - Fungal prophylaxis: Begin Micafungin IV at admission and continue through Day +30. History of photoxic drug eruption with voriconazole, avoid use.   - Bacterial prophylaxis: On Cefepime for fever  - PCP prophylaxis: Bactrim to begin day +28     GI:   # Nausea:  - Scheduled medications: Kytril BID starting 7/21  - PRN medications: Benadryl PRN     # Acid reflux:  protonix once daily. Continue TUMS prn     # Transaminitis:  , AST 98     Neuro:  # Mucositis/pain:   - Oxycodone 5 mg PO every 6 hours PRN   - Tramadone prn     Derm:  Dermatology consultation 7/24. No current rash. He is no longer using creams.      Fertility:  Sperm collected pre chemotherapy for fertility preservation     Discharge Considerations: Expected lengths of hospitalization for patients with complications from stem cell transplant vary based on the complication(s) and severity(ies). A typical stay is 7 days.     The above plan of care was developed by and communicated to me by the Pediatric BMT attending physician, Candido Han PA-C  Pediatric Blood and Marrow Transplant Program  [facility]      BMT Attending Note:    Artemio Nino was admitted today due to his history of a new fever in context of his relapsed disease and neutropenia. He is currently off chemotherapy, and is schedule to receive immunotherapy next week as treatment for his relapsed disease. He was begun on Cefepime, and his vitals are otherwise stable. He will receive transfusions of red cells and platelets as necessary for support.     My care coordination activities today include oversight of planned lab studies, medications and  discussion with BMT team-members including nursing.    Candido Joshua MD  Professor, Dept. Of Pediatrics  Division of Blood and Marrow Transplantation

## 2020-07-17 NOTE — PROGRESS NOTES
Copied from chart review:      PEDIATRIC BLOOD & MARROW TRANSPLANT SOCIAL WORK PSYCHOSOCIAL ASSESSMENT                         Date: 7/16/2020          Assessment of living situation, support system, financial status, functional status, coping abilities/strategies, stressors, need for resources and other social work interventions.          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 Viky Zavala      Nurse Coordinator: Daria Jara          Presenting Information:       Artemio is a 18 year old male patient with Acute Lymphoblastic Leukemia (ALL). Isael had a bone marrow transplant in fall of 2018. He unfortunately relapsed in early 2020 and required further treatment. His hem/onc physician at [facility] and bmt physician at [facility] both felt like the best treatment option for isael at this time is [facility]/CAR-T.  Isael and his family agreed to pursue this.      Special Considerations/Accomodations:       Patient's parents Dangelo and Mervat are  but amicable. They are both involved in Artemio's care as sources of support. Dangelo is remarried to his wife Camryn and Mervat has a significant other named Jada.  explained to Dangelo and Mervat that the care team will be unable to update multiple family members on a daily basis and recommended they come up with a system to update each other.      Until Mervat can get to [place], the team will try to remind Isael to have his mom on speaker phone during rounds so that she has an opportunity to hear the doctor's updates and ask questions in real time. Mervat struggles to cope  if she cannot get information about Isael's medical progress.  Mervat intends to be in [place] within a week or so of admission on 7/17/20            Contact/Legal Info:       Decision Maker(s): Artemio is an adult and able to make his own medical decisions at this time. He wants all medical decisions to go through him and not his parents.      Special Custody Considerations: N/A      Advance Directive: Artemio states he completed an advanced directive while undergoing cancer treatment at [facility]. He signed a release of information so his treatment team at [facility] can access a copy.       Permanent Address: [address] [place] [id number] Lives with  paternal grandmother and paternal grandfather      Local Address: [address] [place] [address]   Staying locally with father and step mother until he is greater than 100 days post BMT.      Phone number(s): Mom - Mervat: [phone]                                                       Dad - Dangelo: [phone]                                                       Step-Mom - Camryn: [phone]              Support System/Relationship Status/Family History: Artemio's parents and paternal grandparents are his primary support system. His parents, Mervat and Dangelo, are  and civil, but prefer to visit Artemio at different times and communicate directly with Artemio versus with each other as much as possible.  Dangelo is remarried and his wife's name is Camryn. Artemio has given verbal permission for his parents and Camryn all to get any information they would like to know. Edwige explained to all of his parents that the medical team will not be able to update multiple family members per day and that they should try to update each other as much as possible.   Artemio also has two siblings, an older sister and brother. His sister Kamilah lives locally in [place], [place] is  and has one child. His brother  "Link lives in [place]. Mervat has relocated back to [place] to live with her significant other, Jada, but is coming back to [place] to assist with the transplant process.   Patient has been living in his patnernal grandparents basement. There historically have been some concerns from various family members that this environment was not suitable for a post BMT patient. (reasoning was that it was damp and musty and it was unknown if mold and mildew were present. Isael will be staying with his mom at a hotel paid for by [facility] post CAR-T infusion.  Artemio states he does not have a significant other at this time.          Unique Patient/Family Needs:  Establishing effective communication pattern with patient and his parents.      Spirituality/Lottie Affiliation: Patient does not identify with lottie community        Communication Preferences: Artemio wants all medical communication and decisions to go through him. He prefers direct to the point communication. He does not like to be bothered with examinations and questions if they're not necessary.              Caregiver Plan: Mervat will be primary caregiver throughout [facility] process.      Patient & Caregiver Knowledge of Medical Condition and Plan of Care: Artemio and his family are understanding of his medical condition and plan of care for BMT.      Patient and Caregiver Transplant Goals: Artemio and his parents state they \"Just want to get through it\".              Patient Personality/Communication/Coping/Interests/Activities: approachable, withdrawn and quiet. Patient states that when he doesn't feel good he irish by playing video games, watching movies or TV and sleeping. He also states he's not a morning person and prefers not to be bothered at night or if he's napping. He requested [facility] when possible. Edwige explained that the nurses try to be as accommodating as possible to patient preferences but it is not always possible due to the large " number tasks they need to complete every day with every patient.      Patient Education/Developmental Level: Patient graduated from [employer] approximately a year ago and had been taking some time off before deciding about college. He plans to revisit applying to higher education once he is done with the BMT process.              Logistical Considerations:  Transportation: Private Car  Parking: at this time parents state they can afford to buy monthly parking passes.  Housing: patient and family is local.  Mervat has rented a short term apartment in [place] until Artemio is through transplant and then she can return to [place].          Financial: Patient and his parents state that at this time they are able to meet their expenses with their current income. Because he essentially has 3 parents to share the care giving work, they all plan to continue working and therefore are hoping to maintain their current income.      Insurance:   [place]  Secondary: formerly [facility]  Unique Issues?:       Patient/Caregiver Sources of Income/Employment: Patient is not employed at this time due to his frequent treatment needs related to his ALL.   Patient's mom, Mervat, is a tele nurse and has started a short term part time casual position at [employer] in [place]. Patient's Dad, Dangelo, owns his own commercial painting business. Since he's the owner he has some flexibility in his schedule and can be away from work regularly. Patient's step mom, Camryn, is a nurse and works at one of the [employer] in the [employer].  Financial Concerns: none at this time. Artemio and his parents were encouraged to reach out if they had any financial concerns in the future.              Patient/Family Psychosocial Considerations:      Mental Health: No mental health issues identified         Chemical Health: No issues identified        Trauma/Loss/Abuse History: No  identified issues        Legal Issues: No issues identified              Clinical Assessment and Recommendations:       Patient and family present as well-informed about and prepared for the treatment process. I did not identify any significant barriers to them managing the demands of treatment.          Concerns: none identified at this time.      Education Provided: Transplant process expectations, Caregiver requirements, Caregiver self-care, Financial issues related to transplant, Financial resources/grants available, Common psychosocial stressors pre/post transplant, Hopsital resources available and Social work role        Interventions Provided:   Education and counseling related to psychosocial issues and resources      Follow up planned:  Psychosocial support

## 2020-07-17 NOTE — PROGRESS NOTES
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 He had a BMBx/LP/ITC performed at [facility] 7/14 and we are awaiting results. He completed apheresis collection in the [facility] last month (6/2020). He has been receiving bridging chemotherapy with oral 6MP, methotrexate and ruxolitinib.  He stopped all oral chemotherapy on 7/12. His non-myeloablative chemotherapy (7/21-7/24) is currently scheduled outpatient in the [facility] and CAR-T infusion (7/27) is currently scheduled for inpatient on unit 4 with admit 7/26.     Isael has a history of significant spinal headaches in the past but no headache reported following his LP 7/15. He received oral caffeine before his procedure and he took one dose again last night. He was also given a 4-day course of gabapentin but Isael did not take it because he was feeling well. He was given tramadol prn for bone marrow biopsy pain but did not have any discomfort and did not take any.  Approximately two weeks ago he developed a fever of 102F with sore throat, headache, emesis x 1 and lightheadedness. No respiratory symptoms. He was admitted to [facility] for 3-4 days and then discharged. He said Covid and other tests were negative. He was told it was likely a virus. He has been afebrile and generally clinically well since dischage. He has developed mouth sores but he said it's mild and he manages it by rinsing his mouth. He decided to stop all of his meds at the time he stopped his oral chemotherapy on 7/12. He has been c/o acid reflux and has been using TUMS but with little relief. Occasional nausea without emesis, using benadryl prn.  Previously he has not required blood product premedications but recently developed hives of the neck and face during  his last four platelet transfusions.  He now requires platelet premedications with tylenol and benadryl. He said they sometimes repeat his benadryl dose midway through his transfusion. He never had tongue swelling or respiratory compromise. His last platelet transfusion was on 7/13 and his last RBC transfusion was approximately two weeks ago.    [facility] and [facility] platelet transfusion. He was found to be febrile to 100.8 prior to his platelet transfusion. Blood Cultures and Cefepime were given in the [facility]. Artemio reports that he has been feeling well over the past few days. He continues to report mouth and throat pain. Denies fever, runny nose or nasal congestion, cough, constipation or diarrhea, rash, or bleeding. At the end of his platelet transfusion, he developed one small hive to his chin, denies itching, given 25 mg of benadryl.     Review of Systems: Pertinent positives include those mentioned in interval events. A complete review of systems was performed and is otherwise negative.    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    Social History: His mother (Mervat) is going to be relocating temporarily from [place] to be his full-time caregiver. Lodging through [facility]. His father (Dangelo), stepmother (Camryn) and paternal grandparents are all involved in his care. Isael has authorized communication with all of them.    Medications:  Please see MAR    Allergies   Allergen Reactions     Blood Transfusion Related (Informational Only) Other (See Comments)     Patient has a history of a clinically significant antibody against RBC antigens.  A delay in compatible RBCs may occur.Stem cell transplant  patient.  Give type O RBCs.  Requires Benadryl as premed for platelets for history of hives     Voriconazole Photosensitivity     Significant rash - do not rechallenge       Physical Exam:  Temp:  [100.8  F (38.2  C)] 100.8  F (38.2  C)  Pulse:  [77] 77  Resp:  [16] 16  BP: (101)/(66) 101/66  SpO2:  [99 %] 99 %    GEN: Lying in infusion bed. Pleasant and cooperative. Dad present  HEENT Head NC/AT, sclerae clear, nares patent, OP with small scattered lesions, MMM  NECK: Supple. No cervical lymphadenopathy  CARD: Regular rate and rhythm. Normal S1 and S2, no murmurs, rubs or gallops. Cap refill < 2 sec  RESP: Lungs Clear to auscultation bilaterally. No increased work of breathing. No adventitious lung sounds.  ABD: Non-distended, non-tender, no organomegaly. Normal bowel sounds  EXTREM: No edema noted  SKIN: No rashes, bruising or petechiae. Port upper right chest, accessed.    Labs:  Results for orders placed or performed in visit on 07/17/20   CBC with platelets differential     Status: Abnormal (In process)   Result Value Ref Range    WBC 1.7 (L) 4.0 - 11.0 10e9/L    RBC Count 3.16 (L) 4.4 - 5.9 10e12/L    Hemoglobin 8.7 (L) 13.3 - 17.7 g/dL    Hematocrit 27.2 (L) 40.0 - 53.0 %    MCV 86 78 - 100 fl    MCH 27.5 26.5 - 33.0 pg    MCHC 32.0 31.5 - 36.5 g/dL    RDW 14.9 10.0 - 15.0 %    Platelet Count 9 (LL) 150 - 450 10e9/L    Diff Method PENDING    Platelets prepare order unit     Status: None   Result Value Ref Range    Blood Component Type PLT Pheresis     Units Ordered 1    Blood component     Status: None   Result Value Ref Range    Unit Number [id number]     Blood Component Type PlateletPheresis LeukoReduced Irradiated     Division Number 00     Status of Unit No longer available 07/17/2020 0847     Blood Product Code [id number]     Unit Status RET        Assessment/Plan:  Artemio Nino is a  20 year old male with 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 CD19 positive (dim). He is set to begin CAR-T ([facility]) per MT2017-45 on July 21, 2020. Due to fever and neutropenia today in clinic, Artemio is being admitted to unit 4.     ALL and BMT: High risk B cell ALL (CNS negative) + JAK2 activation.  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  - Completed apheresis collection in the [facility] (6/2020).   - BMBx/LP/ITC performed at [facility] yesterday (7/14) and we are awaiting results.   - He has been receiving bridging chemotherapy with oral 6MP, methotrexate and ruxolitinib.    - All oral chemotherapy stopped on 7/12.   - Exit consultation 7/16 with Dr. Senior.  - Line access currently with a port (placed 6/10). Double burns line placement scheduled for 7/20- admit through 7/20 for platelets and line placement.   - Both non-myeloablative chemotherapy per MT2017-45 with cytoxan and fludarabine (7/21-7/24) and CAR-T infusion (7/27) originally planned for outpatient.    GVHD:  No signs of GvHD to date.     FEN/Renal:  ml/min  # Risk for malnutrition:  - Monitor nutritional intake  - Age appropriate diet     # Risk for electrolyte abnormalities:  - Check daily electrolytes    # Risk for renal dysfunction and fluid overload: workup UA: RBC 6, protein albumin 30. Asymptomatic  - Monitor I/O's and daily weights    # Risk for TA-TMA:  - monitor LDH qMonday  - monitor urine protein/creatinine ratios qTuesday    # Risk for TLS:  - Start Allopurinol TID on 7/21    Pulmonary:  # Risk for pulmonary insufficiency: No current concerns. 7/15/20, normal chest CT. Sinus CT showed no evidence of acute sinusitis. Interval improved polypoid mucosal thickening of the maxillary sinus with complete resolution on left.  - History of  pneumomediastinum: finding upon work up Chest CT (10/22/19), asymptomatic. Per radiology, likely benign, perhaps transient. Now Resolved.    Cardiovascular:  Aaron-Parkinson-White Syndrome: diagnosed upon syncope in 02/2018. Asymptomatic, no interventions to date. No current concerns.  - EKG 7/15 revealed WPW with normal sinus rhythm. Will reach out to cardiology for their input  - Echocardiogram 7/15 showed LVEF of 62%    Heme:   # Pancytopenia secondary to chemotherapy:  - Transfuse for hemoglobin < 7, platelets < 10,000-   - - New history of significant hives with platelet transfusions x last 4 transfusions. Premedicate with tylenol and benadryl for platelets. May also need mid-med.  Last transfusion on 7/13. No premedications needed for RBC transfusions. Last RBC transfusion approximately two weeks ago.  - No GCSF    # Coags: Workup-  INR 1.18, PTT 35 (7/15)    Infectious Disease:  # Febrile Neutropenia: febrile to 100.8 7/17  - Start Cefepime  - Blood culture q 24 hr PRN with fevers > 100.4     # Risk for infection given immunocompromised status: BMT ID labs pending   Active: COVID and Influenza A and B pending 7/17  Prophylaxis:        - viral prophylaxis: HD Valtrex starting 7/21 to continue through Day +30  - fungal prophylaxis: Micafungin IV starting 7/21 and continue through Day +30. History of photoxic drug eruption with voriconazole, avoid use.   - bacterial prophylaxis: Levofloxacin 500 mg daily and continue until ANC >1000  - PCP prophylaxis: Bactrim to begin day +28    GI:   # Nausea:  - Scheduled medications: Kytril BID starting 7/21  - PRN medications: Benadryl PRN    # Acid reflux:  protonix once daily. Continue TUMS prn    # Transaminitis:  , AST 98    Neuro:  # Mucositis/pain:   - Oxycodone 5 mg PO every 6 hours PRN     Derm:  Dermatology consultation 7/24. No current rash. He is no longer using creams.     Fertility:  Sperm collected pre chemotherapy for fertility preservation    Gabriella  Humberto AUGUSTINNP-PC  [facility]  Pediatric Blood and Marrow Transplant      Patient Active Problem List   Diagnosis     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)     Aaron-Parkinson-White (WPW) syndrome     Bone marrow transplant candidate     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)     At risk for infection     S/P allogeneic bone marrow transplant (H)     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)

## 2020-07-17 NOTE — PHARMACY-ADMISSION MEDICATION HISTORY
Pediatric BMT medication history for the 7/17/2020 admission is complete. See Epic admission navigator for allergy information, retail pharmacy, prior to admission medications and immunization status.     Pre-BMT pharmacy consult medication history was completed on 7/16/20 by Mary Kelly PharmD.  Please refer to the pharmacy note from that encounter for additional details.    Patient medications, recent/pending drug levels, and any outpatient IV therapies were reviewed and discussed with the admitting POLLO/hospitalist.     Patient preference for medications  Artemio prefers that medication comes as pills     Changes made to PTA medication list   Added: None  Deleted: None  Changed: None    PTA medications not ordered this admission  None    Immunosuppression goals:  None    Medications being tapered:  None    Outpatient IV therapies:  None    Additional information:  The following medications have been ordered for outpatient to start next week (have not been started yet): allopurinol, granisetron, levofloxacin, lorazepam, valacyclovir.     Prior to Admission medications    Medication Sig Last Dose Taking? Auth Provider   allopurinol (ZYLOPRIM) 300 MG tablet Take 0.5 tablets (150 mg) by mouth 3 times daily for 7 days  Not started Cheng Ramos MD   calcium carbonate (TUMS) 500 MG chewable tablet Take 1-2 chew tab by mouth daily as needed for heartburn   Unknown, Entered By History   diphenhydrAMINE (BENADRYL) 50 MG capsule Take 1 capsule (50 mg) by mouth every 6 hours as needed for sleep or other (Nausea)   Cheng Ramos MD   granisetron (KYTRIL) 1 MG tablet Take 1 tablet (1 mg) by mouth 2 times daily for 14 days  Not started Cheng Ramos MD   levofloxacin (LEVAQUIN) 500 MG tablet Take 1 tablet (500 mg) by mouth daily for 14 days  Not started Cheng Ramos MD   LORazepam (ATIVAN) 0.5 MG tablet Take 1 tablet (0.5 mg) by mouth every 6  hours as needed for nausea  Not started Cheng Ramos MD   melatonin 5 MG tablet Take 5-10 mg by mouth nightly as needed for sleep   Unknown, Entered By History   oxyCODONE (ROXICODONE) 5 MG tablet Take 1 tablet (5 mg) by mouth every 6 hours as needed for severe pain (Cancer related pain)   Jaquelin Senior MD   pantoprazole (PROTONIX) 40 MG EC tablet Take 1 tablet (40 mg) by mouth daily   Candido Han PA-C   sulfamethoxazole-trimethoprim (BACTRIM DS/SEPTRA DS) 800-160 MG per tablet Take 1 tablet by mouth Every Mon, Tues two times daily  Patient taking differently: Take 1 tablet by mouth Every Mon, Tues two times daily Missed dosing on 7/13 & 14 - instructed to take on Saturday and Sunday (7/18 & 19) prior to initiating chemo, plan to resume again on day +28 (8/24/2020)   Candido Joshua MD   traMADol (ULTRAM) 50 MG tablet Take 50 mg by mouth every 6 hours as needed for pain (mild or anticipated)   Unknown, Entered By History   valACYclovir (VALTREX) 1000 mg tablet Take 1 tablet (1,000 mg) by mouth 3 times daily for 28 days  Not started Cheng Ramos MD

## 2020-07-17 NOTE — PROGRESS NOTES
PEDIATRIC BLOOD & MARROW TRANSPLANT SOCIAL WORK PSYCHOSOCIAL ASSESSMENT                         Date: 7/16/2020          Assessment of living situation, support system, financial status, functional status, coping abilities/strategies, stressors, need for resources and other social work interventions.          Date of Initial Consultation(s): 4/6/2018      Date of Pre-Transplant Work-Up Psychosocial Assessment: 10/25/18      Date of Re-assessment(s): N/A      Diagnosis and Accompanying Co-Morbidities: B lineage Acute Lymphoblastic Leukemia (ALL)      Date of Diagnosis: 2/5/18      Date of Relapse(s), if applicable: N/A      Transplant/Therapy Type: CAR-T ([facility])      Stem Cell Source: patient          Physician(s): Dr. Viky Zavala      Nurse Coordinator: Daria Jara          Presenting Information:       Artemio is a 18 year old male patient with Acute Lymphoblastic Leukemia (ALL). Isael had a bone marrow transplant in fall of 2018. He unfortunately relapsed in early 2020 and required further treatment. His hem/onc physician at [facility] and bmt physician at [facility] both felt like the best treatment option for isael at this time is [facility]/CAR-T.  Isael and his family agreed to pursue this.      Special Considerations/Accomodations:       Patient's parents Dangelo and Mervat are  but amicable. They are both involved in Artemio's care as sources of support. Dangelo is remarried to his wife Camryn and Mervat has a significant other named Jada.  explained to Dangelo and Mervat that the care team will be unable to update multiple family members on a daily basis and recommended they come up with a system to update each other.     Until Mervat can get to [place], the team will try to remind Isael to have his mom on speaker phone during rounds so that she has an opportunity to hear the doctor's updates and ask questions in real time. Mervat struggles to cope if she cannot get information about Isael's  medical progress.  Mervat intends to be in [place] within a week or so of admission on 7/17/20            Contact/Legal Info:       Decision Maker(s): Artemio is an adult and able to make his own medical decisions at this time. He wants all medical decisions to go through him and not his parents.      Special Custody Considerations: N/A      Advance Directive: Artemio states he completed an advanced directive while undergoing cancer treatment at [facility]. He signed a release of information so his treatment team at [facility] can access a copy.       Permanent Address: [address] [place] [address] Lives with  paternal grandmother and paternal grandfather      Local Address: [address] [place] [address]   Staying locally with father and step mother until he is greater than 100 days post BMT.      Phone number(s): Mom - Mervat: [phone]                                                       Dad - Dangelo: [phone]                                                       Step-Mom - Camryn: [phone]              Support System/Relationship Status/Family History: Artemio's parents and paternal grandparents are his primary support system. His parents, Mervat and Dangelo, are  and civil, but prefer to visit Artemio at different times and communicate directly with Artemio versus with each other as much as possible.  Dangelo is remarried and his wife's name is Camryn. Artemio has given verbal permission for his parents and Camryn all to get any information they would like to know. Edwige explained to all of his parents that the medical team will not be able to update multiple family members per day and that they should try to update each other as much as possible.   Artemio also has two siblings, an older sister and brother. His sister Kamilah lives locally in [place], [place] is  and has one child. His brother Link lives in [place]. Mervat has relocated  "back to [place] to live with her significant other, Jada, but is coming back to [place] to assist with the transplant process.   Patient has been living in his patnernal grandparents basement. There historically have been some concerns from various family members that this environment was not suitable for a post BMT patient. (reasoning was that it was damp and musty and it was unknown if mold and mildew were present. Isael will be staying with his mom at a hotel paid for by [facility] post CAR-T infusion.  Artemio states he does not have a significant other at this time.          Unique Patient/Family Needs:  Establishing effective communication pattern with patient and his parents.      Spirituality/Lottie Affiliation: Patient does not identify with lottie community        Communication Preferences: Artemio wants all medical communication and decisions to go through him. He prefers direct to the point communication. He does not like to be bothered with examinations and questions if they're not necessary.              Caregiver Plan: Mervat will be primary caregiver throughout [facility]riah process.      Patient & Caregiver Knowledge of Medical Condition and Plan of Care: Artemio and his family are understanding of his medical condition and plan of care for BMT.      Patient and Caregiver Transplant Goals: Artemio and his parents state they \"Just want to get through it\".              Patient Personality/Communication/Coping/Interests/Activities: approachable, withdrawn and quiet. Patient states that when he doesn't feel good he irish by playing video games, watching movies or TV and sleeping. He also states he's not a morning person and prefers not to be bothered at night or if he's napping. He requested nursing [facility] cares when possible. Edwige explained that the nurses try to be as accommodating as possible to patient preferences but it is not always possible due to the large number tasks they need to complete every " day with every patient.      Patient Education/Developmental Level: Patient graduated from highschool approximately a year ago and had been taking some time off before deciding about college. He plans to revisit applying to higher education once he is done with the BMT process.              Logistical Considerations:  Transportation: Private Car  Parking: at this time parents state they can afford to buy monthly parking passes.  Housing: patient and family is local.  Mervat has rented a short term apartment in [place] until Artemio is through transplant and then she can return to [place].          Financial: Patient and his parents state that at this time they are able to meet their expenses with their current income. Because he essentially has 3 parents to share the care giving work, they all plan to continue working and therefore are hoping to maintain their current income.      Insurance:   Primary: [employer] of [place], Camryn carries this insurance.  Secondary: [facility]  Unique Issues?:       Patient/Caregiver Sources of Income/Employment: Patient is not employed at this time due to his frequent treatment needs related to his ALL.   Patient's mom, Mervat, is a tele nurse and has started a short term part time casual position at [employer] in [place]. Patient's Dad, Dangelo, owns his own commercial painting business. Since he's the owner he has some flexibility in his schedule and can be away from work regularly. Patient's step mom, Camryn, is a nurse and works at one of the [employer] in the [employer] [facility].  Financial Concerns: none at this time. Artemio and his parents were encouraged to reach out if they had any financial concerns in the future.              Patient/Family Psychosocial Considerations:      Mental Health: No mental health issues identified         Chemical Health: No issues identified        Trauma/Loss/Abuse History: No identified issues        Legal Issues: No  issues identified              Clinical Assessment and Recommendations:       Patient and family present as well-informed about and prepared for the treatment process. I did not identify any significant barriers to them managing the demands of treatment.          Concerns: none identified at this time.      Education Provided: Transplant process expectations, Caregiver requirements, Caregiver self-care, Financial issues related to transplant, Financial resources/grants available, Common psychosocial stressors pre/post transplant, Hopsital resources available and Social work role        Interventions Provided:   Education and counseling related to psychosocial issues and resources      Follow up planned:  Psychosocial support

## 2020-07-17 NOTE — PLAN OF CARE
T-max 99.1 oral. Received a dose of oxycodone for sore throat 7/10 with moderate relief.  Awaiting results of Covid swab in clinic. Dad, Giuseppe, was informed that if he were to arrive in [facility]'s room, he would need to stay there until [facility]'s covid status is known. After calling Infectious Disease lab, RN contacted Giuseppe to let him know that a result may not be aquired for as long as 24-72 hours. Giuseppe would like to be informed as soon as the results are known since he cannot be confined to [facility]'s room. 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 He will be called as soon as results post.

## 2020-07-17 NOTE — PROGRESS NOTES
Infusion Nursing Note    Artemio Nino Presents to [facility] today for:platelets    Due to :    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)  S/P allogeneic bone marrow transplant (H)  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)    Intravenous Access/Labs: Port accessed per sterile technique without issue. Blood return noted. Labs drawn as ordered.     Infusion Note: Tylenol & IV benadryl administered as premeds for platelets. Benadryl administered over 15 minutes. Baseline temperature 100.8F, provider Gabriella Paul notified. Blood cultures drawn. IV antibiotic administered per orders. Due to wait time for admission it was decided to administer platelets at 11am at [facility] after initially delaying administration. Upon completion of platelets patient noted to have a hive on his chin. Provider Gabriella Paul in to assess patient. 25mg IV benadryl administered per orders over 15 minutes. Gabriella in to reassess patient. Per Gabriella anderson to transfer to inpt. Patient transferred to unit 4.

## 2020-07-18 LAB
ACANTHOCYTES BLD QL SMEAR: SLIGHT
ANION GAP SERPL CALCULATED.3IONS-SCNC: 4 MMOL/L (ref 3–14)
ANISOCYTOSIS BLD QL SMEAR: SLIGHT
BASOPHILS # BLD AUTO: 0 10E9/L (ref 0–0.2)
BASOPHILS NFR BLD AUTO: 0 %
BLASTS # BLD: 0.4 10E9/L
BLASTS BLD QL SMEAR: 31.3 %
BUN SERPL-MCNC: 14 MG/DL (ref 7–30)
CALCIUM SERPL-MCNC: 8 MG/DL (ref 8.5–10.1)
CHLORIDE SERPL-SCNC: 107 MMOL/L (ref 94–109)
CO2 SERPL-SCNC: 25 MMOL/L (ref 20–32)
CREAT SERPL-MCNC: 0.86 MG/DL (ref 0.66–1.25)
DIFFERENTIAL METHOD BLD: ABNORMAL
ELLIPTOCYTES BLD QL SMEAR: SLIGHT
EOSINOPHIL # BLD AUTO: 0 10E9/L (ref 0–0.7)
EOSINOPHIL NFR BLD AUTO: 0.9 %
ERYTHROCYTE [DISTWIDTH] IN BLOOD BY AUTOMATED COUNT: 14.6 % (ref 10–15)
GFR SERPL CREATININE-BSD FRML MDRD: >90 ML/MIN/{1.73_M2}
GLUCOSE SERPL-MCNC: 100 MG/DL (ref 70–99)
HCT VFR BLD AUTO: 24.5 % (ref 40–53)
HGB BLD-MCNC: 7.8 G/DL (ref 13.3–17.7)
LYMPHOCYTES # BLD AUTO: 0.9 10E9/L (ref 0.8–5.3)
LYMPHOCYTES NFR BLD AUTO: 66.9 %
MAGNESIUM SERPL-MCNC: 2.1 MG/DL (ref 1.6–2.3)
MCH RBC QN AUTO: 27.8 PG (ref 26.5–33)
MCHC RBC AUTO-ENTMCNC: 31.8 G/DL (ref 31.5–36.5)
MCV RBC AUTO: 87 FL (ref 78–100)
MONOCYTES # BLD AUTO: 0 10E9/L (ref 0–1.3)
MONOCYTES NFR BLD AUTO: 0.9 %
NEUTROPHILS # BLD AUTO: 0 10E9/L (ref 1.6–8.3)
NEUTROPHILS NFR BLD AUTO: 0 %
PLATELET # BLD AUTO: 41 10E9/L (ref 150–450)
PLATELET # BLD EST: ABNORMAL 10*3/UL
POIKILOCYTOSIS BLD QL SMEAR: SLIGHT
POTASSIUM SERPL-SCNC: 3.9 MMOL/L (ref 3.4–5.3)
RBC # BLD AUTO: 2.81 10E12/L (ref 4.4–5.9)
RBC INCLUSIONS BLD: SLIGHT
SARS-COV-2 RNA SPEC QL NAA+PROBE: NOT DETECTED
SODIUM SERPL-SCNC: 136 MMOL/L (ref 133–144)
SPECIMEN SOURCE: NORMAL
SPECIMEN SOURCE: NORMAL
STREP GROUP A PCR: NOT DETECTED
WBC # BLD AUTO: 1.3 10E9/L (ref 4–11)

## 2020-07-18 PROCEDURE — 25800030 ZZH RX IP 258 OP 636: Performed by: PHYSICIAN ASSISTANT

## 2020-07-18 PROCEDURE — 25000128 H RX IP 250 OP 636: Performed by: PHYSICIAN ASSISTANT

## 2020-07-18 PROCEDURE — 25000132 ZZH RX MED GY IP 250 OP 250 PS 637: Performed by: STUDENT IN AN ORGANIZED HEALTH CARE EDUCATION/TRAINING PROGRAM

## 2020-07-18 PROCEDURE — 85025 COMPLETE CBC W/AUTO DIFF WBC: CPT | Performed by: PHYSICIAN ASSISTANT

## 2020-07-18 PROCEDURE — 20600000 ZZH R&B BMT

## 2020-07-18 PROCEDURE — 83735 ASSAY OF MAGNESIUM: CPT | Performed by: PHYSICIAN ASSISTANT

## 2020-07-18 PROCEDURE — 25000132 ZZH RX MED GY IP 250 OP 250 PS 637: Performed by: PHYSICIAN ASSISTANT

## 2020-07-18 PROCEDURE — 80048 BASIC METABOLIC PNL TOTAL CA: CPT | Performed by: PHYSICIAN ASSISTANT

## 2020-07-18 PROCEDURE — 25800025 ZZH RX 258: Performed by: PEDIATRICS

## 2020-07-18 RX ADMIN — MICAFUNGIN SODIUM 50 MG: 10 INJECTION, POWDER, LYOPHILIZED, FOR SOLUTION INTRAVENOUS at 13:39

## 2020-07-18 RX ADMIN — PANTOPRAZOLE SODIUM 40 MG: 40 TABLET, DELAYED RELEASE ORAL at 09:53

## 2020-07-18 RX ADMIN — OXYCODONE HYDROCHLORIDE 5 MG: 5 TABLET ORAL at 10:27

## 2020-07-18 RX ADMIN — OXYCODONE HYDROCHLORIDE 5 MG: 5 TABLET ORAL at 17:00

## 2020-07-18 RX ADMIN — SULFAMETHOXAZOLE AND TRIMETHOPRIM 1 TABLET: 800; 160 TABLET ORAL at 10:28

## 2020-07-18 RX ADMIN — DEXTROSE MONOHYDRATE AND SODIUM CHLORIDE: 5; .45 INJECTION, SOLUTION INTRAVENOUS at 10:30

## 2020-07-18 RX ADMIN — DEXTROSE MONOHYDRATE AND SODIUM CHLORIDE: 5; .45 INJECTION, SOLUTION INTRAVENOUS at 22:10

## 2020-07-18 RX ADMIN — CEFEPIME 2 G: 2 INJECTION, POWDER, FOR SOLUTION INTRAVENOUS at 17:07

## 2020-07-18 RX ADMIN — OXYCODONE HYDROCHLORIDE 5 MG: 5 TABLET ORAL at 22:10

## 2020-07-18 RX ADMIN — SULFAMETHOXAZOLE AND TRIMETHOPRIM 1 TABLET: 800; 160 TABLET ORAL at 20:25

## 2020-07-18 RX ADMIN — CEFEPIME 2 G: 2 INJECTION, POWDER, FOR SOLUTION INTRAVENOUS at 09:53

## 2020-07-18 RX ADMIN — SODIUM CHLORIDE: 9 INJECTION, SOLUTION INTRAVENOUS at 08:50

## 2020-07-18 RX ADMIN — OXYCODONE HYDROCHLORIDE 5 MG: 5 TABLET ORAL at 05:06

## 2020-07-18 NOTE — PROGRESS NOTES
Pediatric BMT Daily Progress Note    Interval Events: Continues to have right sided throat pain which makes eating difficult though he says he is eating some soft foods. Oxycodone helps with the pain, but it only lasts a few hours. He developed a lump on his head yesterday after his platelet transfusion that he thinks was a hive (he's had this happen before). He thinks it has resolved this morning. Continues to have intermittent fevers (last at 2100 yesterday).    Review of Systems: Pertinent positives include those mentioned in interval events. A complete review of systems was performed and is otherwise negative.      Medications:  Please see MAR    Physical Exam:  Temp:  [98.7  F (37.1  C)-101.1  F (38.4  C)] 99.1  F (37.3  C)  Pulse:  [61-77] 77  Heart Rate:  [61-80] 61  Resp:  [16-18] 16  BP: ()/(54-66) 93/57  SpO2:  [97 %-100 %] 98 %  I/O last 3 completed shifts:  In: 2060 [P.O.:360; I.V.:1700]  Out: 875 [Urine:875]  GEN: Lying in bed. Pleasant and cooperative. NAD  HEENT: Head NC/AT, previous lump is not palpable. Sclerae clear, PERRL, EOMs intact,  nares patent, OP with no obvious lesions, MMM.  NECK: Supple. Small right sided cervical lymph node is palpable and painful to the touch.   CARD: Regular rate and rhythm. Normal S1 and S2, no murmurs, rubs or gallops. Cap refill < 2 sec  RESP: Lungs Clear to auscultation bilaterally. No increased work of breathing. No adventitious lung sounds.  ABD: Non-distended, non-tender, no organomegaly. Normal bowel sounds  EXTREM: No edema noted  SKIN: No rashes, bruising or petechiae. Port upper right chest, accessed.  NEURO: No focal deficits  ACCESS: Port upper right chest    Labs:  Labs reviewed, pertinent findings: Strep negative, WBC 1.3 with an ANC of 0, Hgb 7.8, platelets 41K. BUN 14, creatinine 0.86.     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 CD19 positive (dim). He is set to begin CAR-T ([facility]) per [id number] on July 21, 2020.      Admitted from [facility] yesterday due to fever. Cefepime initiated and blood cultures obtained. Cultures are negative to date though COVID testing is pending.        ALL and BMT: High risk B cell ALL (CNS negative) + JAK2 activation.  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  - Completed apheresis collection in the [facility] (6/2020).   - He has been receiving bridging chemotherapy with oral 6MP, methotrexate and ruxolitinib. All oral chemotherapy stopped on 7/12.   - BM this week showed 75% involvement with leukemia. CSF was negative.   - Lymphodepleting chemotherapy per [id number] with cytoxan and fludarabine planned for 7/21-7/24 and CAR-T infusion (7/27) originally planned for outpatient. Will need to see how he does clinically in the next couple of days before deciding if this needs to be delayed.      GVHD:  No signs of GvHD to date.    Heme:   # Pancytopenia secondary to chemotherapy:  - Transfuse for hemoglobin < 7, platelets < 10,000.  - New history of significant hives with platelet transfusions x last 4 transfusions. Premedicate with tylenol and benadryl for platelets. May also need mid-med.    - No premedications needed for RBC transfusions. Last RBC transfusion approximately two weeks ago.  - No GCSF     Infectious Disease:  # Febrile Neutropenia: Continues to have fevers (last at 2100 on 7/17). May be related to underlying active leukemia, but need to rule out infection.   - Continue cefepime while awaiting blood culture results.   - Blood culture q 24 hr PRN with fevers > 100.4F  - COVID testing is pending     # Risk for infection given  immunocompromised status: BMT ID labs pending   Active: See above  Prophylaxis:                                                                      - Viral prophylaxis: HD Valtrex starting 7/21 to continue through Day +30  - Fungal prophylaxis: Begin Micafungin at admission and continue through Day +30. History of photoxic drug eruption with voriconazole, avoid use.   - Bacterial prophylaxis: Cefepime with fever (see above)  - PCP prophylaxis: Bactrim to begin day +28     FEN/Renal:   # Risk for malnutrition: Difficulty with eating secondary to throat pain.   - Monitor nutritional intake  - Age appropriate diet   - Started D5 1/2NS at 100 mL/hour. Low thresh hold to start TPN if not eating.      # Risk for electrolyte abnormalities:  - Check daily electrolytes     # Risk for renal dysfunction and fluid overload:   - Monitor I/O's and daily weights  -  ml/min     # Risk for TLS:  - Start Allopurinol TID on 7/21     Pulmonary:  # Risk for pulmonary insufficiency: No current concerns. Normal chest and sinus CTs during work-up.      Cardiovascular:  Aaron-Parkinson-White Syndrome: diagnosed upon syncope in 02/2018. Asymptomatic, no interventions to date. No current concerns.  - EKG 7/15 revealed WPW with normal sinus rhythm.   - Echocardiogram 7/15 showed LVEF of 62%  - Cardiology to decide if they want to assess him while inpatient.      GI:   # Nausea:  - Scheduled medications: Kytril BID starting 7/21  - PRN medications: Benadryl PRN     # Acid reflux:  Protonix once daily. Continue TUMS prn     # Transaminitis:  Possibly related to recent chemotherapy. Will follow.      Neuro:  # Mucositis/pain: Throat pain is stable  - Oxycodone 5 mg PO every 6 hours PRN   - Tramadol prn     Derm:  Dermatology consultation 7/24. No current rash. He is no longer using creams.      Fertility:  Sperm collected pre chemotherapy for fertility preservation     Discharge Considerations: Expected lengths of hospitalization for  patients with complications from stem cell transplant vary based on the complication(s) and severity(ies). A typical stay is 7 days.      The above plan of care was developed by and communicated to me by the Pediatric BMT attending physician, Candido Senior MD  BMT [facility]       BMT Attending Note:    Artemio Nino was seen and evaluated by me today.     Interval history: Over the past 24 hours, specific issues of note have included continued throat discomfort, which seems to be related to a right sided cervical node. There is no erythema to suggest an infectious process. He remains neutropenic, but his vitals are stable, and there are no positive blood cultures to date. He remains on antibiotics.  I have reviewed changes and data in the status over the last 24 hours, including the vital signs, medications and lab results.  I assisted in formulating a plan, which was discussed amongst the BMT team.  This plan was also shared with the family, as his mother was on the phone today during the visit; I answered all questions to the best of my ability.      My care coordination activities today include oversight of planned lab studies, medication changes and discussion with BMT team-members including nursing.    The total amount of time spent in the care of Artemio Nino today was >40 minutes, at least 50% of which was counseling and coordination of care.    Candido Joshua MD  Professor, Dept. Of Pediatrics  Division of Blood and Marrow Transplantation

## 2020-07-18 NOTE — PLAN OF CARE
Afebrile. VSS. Sats well on RA. LSC. Pt complained of throat pain, rated as 6-7 out of 10, PRN oxycodone X1 given. Voids well. No Stool. No replacement needed. Continue POC.

## 2020-07-18 NOTE — PLAN OF CARE
"Afebrile, vss, lungs clear. Sats 100% on room air. States throat pain is \"7\" but declines pain meds when asked. He asked for 5 mg oxycodone x1, but states it only helps for a few hours. Throat appears reddened.   Irritable and sarcastic most of the shift. Cooperative, but does not like being asked questions.   Ate some breakfast. No apparent nausea. In bed all shift, sleeping or using cell phone.   Stepmother called for update this afternoon. She states he generally is very quiet, and does not like being asked questions in general. He is disappointed he was unable to go do an activity that had been planned for this weekend.   Hourly rounding completed.   "

## 2020-07-18 NOTE — PLAN OF CARE
Temp=101.1 oral temp. Patient on Cefepime antibiotic C/O throat pain at 7/10 on numbers scale. Received Oxycodone prn X 2 with minimal relief of pain. Throat culture sent. Awaiting results of Covid-19 test. Port accessed and infusing NS at 100 ml/hour.

## 2020-07-19 LAB
ACANTHOCYTES BLD QL SMEAR: SLIGHT
ANION GAP SERPL CALCULATED.3IONS-SCNC: 4 MMOL/L (ref 3–14)
ANISOCYTOSIS BLD QL SMEAR: SLIGHT
BASOPHILS # BLD AUTO: 0 10E9/L (ref 0–0.2)
BASOPHILS NFR BLD AUTO: 0 %
BLASTS # BLD: 0.3 10E9/L
BLASTS BLD QL SMEAR: 24 %
BUN SERPL-MCNC: 11 MG/DL (ref 7–30)
CALCIUM SERPL-MCNC: 8.5 MG/DL (ref 8.5–10.1)
CHLORIDE SERPL-SCNC: 107 MMOL/L (ref 94–109)
CO2 SERPL-SCNC: 25 MMOL/L (ref 20–32)
CREAT SERPL-MCNC: 0.75 MG/DL (ref 0.66–1.25)
DIFFERENTIAL METHOD BLD: ABNORMAL
ELLIPTOCYTES BLD QL SMEAR: SLIGHT
EOSINOPHIL # BLD AUTO: 0 10E9/L (ref 0–0.7)
EOSINOPHIL NFR BLD AUTO: 3 %
ERYTHROCYTE [DISTWIDTH] IN BLOOD BY AUTOMATED COUNT: 14.5 % (ref 10–15)
GFR SERPL CREATININE-BSD FRML MDRD: >90 ML/MIN/{1.73_M2}
GLUCOSE SERPL-MCNC: 122 MG/DL (ref 70–99)
HCT VFR BLD AUTO: 23.6 % (ref 40–53)
HGB BLD-MCNC: 8 G/DL (ref 13.3–17.7)
LYMPHOCYTES # BLD AUTO: 0.8 10E9/L (ref 0.8–5.3)
LYMPHOCYTES NFR BLD AUTO: 73 %
MAGNESIUM SERPL-MCNC: 2.1 MG/DL (ref 1.6–2.3)
MCH RBC QN AUTO: 28.2 PG (ref 26.5–33)
MCHC RBC AUTO-ENTMCNC: 33.9 G/DL (ref 31.5–36.5)
MCV RBC AUTO: 83 FL (ref 78–100)
MICROCYTES BLD QL SMEAR: PRESENT
MONOCYTES # BLD AUTO: 0 10E9/L (ref 0–1.3)
MONOCYTES NFR BLD AUTO: 0 %
NEUTROPHILS # BLD AUTO: 0 10E9/L (ref 1.6–8.3)
NEUTROPHILS NFR BLD AUTO: 0 %
PLATELET # BLD AUTO: 32 10E9/L (ref 150–450)
PLATELET # BLD EST: ABNORMAL 10*3/UL
POIKILOCYTOSIS BLD QL SMEAR: SLIGHT
POTASSIUM SERPL-SCNC: 3.9 MMOL/L (ref 3.4–5.3)
RBC # BLD AUTO: 2.84 10E12/L (ref 4.4–5.9)
RBC INCLUSIONS BLD: SLIGHT
SODIUM SERPL-SCNC: 136 MMOL/L (ref 133–144)
WBC # BLD AUTO: 1.1 10E9/L (ref 4–11)

## 2020-07-19 PROCEDURE — 83735 ASSAY OF MAGNESIUM: CPT | Performed by: PHYSICIAN ASSISTANT

## 2020-07-19 PROCEDURE — 25000128 H RX IP 250 OP 636: Performed by: PHYSICIAN ASSISTANT

## 2020-07-19 PROCEDURE — 25000132 ZZH RX MED GY IP 250 OP 250 PS 637: Performed by: STUDENT IN AN ORGANIZED HEALTH CARE EDUCATION/TRAINING PROGRAM

## 2020-07-19 PROCEDURE — 25800025 ZZH RX 258: Performed by: PEDIATRICS

## 2020-07-19 PROCEDURE — 80048 BASIC METABOLIC PNL TOTAL CA: CPT | Performed by: PHYSICIAN ASSISTANT

## 2020-07-19 PROCEDURE — 25000132 ZZH RX MED GY IP 250 OP 250 PS 637: Performed by: PEDIATRICS

## 2020-07-19 PROCEDURE — 20600000 ZZH R&B BMT

## 2020-07-19 PROCEDURE — 25000132 ZZH RX MED GY IP 250 OP 250 PS 637: Performed by: PHYSICIAN ASSISTANT

## 2020-07-19 PROCEDURE — 85025 COMPLETE CBC W/AUTO DIFF WBC: CPT | Performed by: PHYSICIAN ASSISTANT

## 2020-07-19 PROCEDURE — 25800030 ZZH RX IP 258 OP 636: Performed by: PHYSICIAN ASSISTANT

## 2020-07-19 RX ORDER — CHLORHEXIDINE GLUCONATE ORAL RINSE 1.2 MG/ML
15 SOLUTION DENTAL 2 TIMES DAILY
Status: DISCONTINUED | OUTPATIENT
Start: 2020-07-19 | End: 2020-07-20 | Stop reason: HOSPADM

## 2020-07-19 RX ADMIN — OXYCODONE HYDROCHLORIDE 5 MG: 5 TABLET ORAL at 04:51

## 2020-07-19 RX ADMIN — CHLORHEXIDINE GLUCONATE 0.12% ORAL RINSE 15 ML: 1.2 LIQUID ORAL at 12:04

## 2020-07-19 RX ADMIN — SULFAMETHOXAZOLE AND TRIMETHOPRIM 1 TABLET: 800; 160 TABLET ORAL at 07:27

## 2020-07-19 RX ADMIN — DEXTROSE MONOHYDRATE AND SODIUM CHLORIDE: 5; .45 INJECTION, SOLUTION INTRAVENOUS at 07:27

## 2020-07-19 RX ADMIN — PANTOPRAZOLE SODIUM 40 MG: 40 TABLET, DELAYED RELEASE ORAL at 07:27

## 2020-07-19 RX ADMIN — CHLORHEXIDINE GLUCONATE 0.12% ORAL RINSE 15 ML: 1.2 LIQUID ORAL at 19:32

## 2020-07-19 RX ADMIN — MICAFUNGIN SODIUM 50 MG: 10 INJECTION, POWDER, LYOPHILIZED, FOR SOLUTION INTRAVENOUS at 14:32

## 2020-07-19 RX ADMIN — DEXTROSE MONOHYDRATE AND SODIUM CHLORIDE: 5; .45 INJECTION, SOLUTION INTRAVENOUS at 19:32

## 2020-07-19 RX ADMIN — CEFEPIME 2 G: 2 INJECTION, POWDER, FOR SOLUTION INTRAVENOUS at 16:41

## 2020-07-19 RX ADMIN — HEPARIN, PORCINE (PF) 10 UNIT/ML INTRAVENOUS SYRINGE 3 ML: at 21:46

## 2020-07-19 RX ADMIN — CEFEPIME 2 G: 2 INJECTION, POWDER, FOR SOLUTION INTRAVENOUS at 00:28

## 2020-07-19 RX ADMIN — OXYCODONE HYDROCHLORIDE 5 MG: 5 TABLET ORAL at 18:53

## 2020-07-19 RX ADMIN — SULFAMETHOXAZOLE AND TRIMETHOPRIM 1 TABLET: 800; 160 TABLET ORAL at 19:32

## 2020-07-19 RX ADMIN — CEFEPIME 2 G: 2 INJECTION, POWDER, FOR SOLUTION INTRAVENOUS at 09:01

## 2020-07-19 NOTE — PLAN OF CARE
Afebrile. VSS. Sats high 90s-100 on RA. LSC. Complained of sore throat, PRN Oxycodone given with some relief. Voids well. No stool. Continue POC.

## 2020-07-19 NOTE — PROGRESS NOTES
Pediatric BMT Daily Progress Note    Interval Events:   Overnight continued to complain of throat pain and received prn oxycodone x 3. Overall he states throat pain is improving but now reports gum discomfort. He notes that he has gum recession and a feeling increased sensitivity on his lower right incisor, no bleeding. He does report drinking fluids well and is planning to order breakfast this morning. No remaining hives. He denies nausea and vomiting. He feels well today.     Review of Systems: Pertinent positives include those mentioned in interval events. A complete review of systems was performed and is otherwise negative.      Medications:  Please see MAR    Physical Exam:  Temp:  [98.6  F (37  C)-99.4  F (37.4  C)] 98.6  F (37  C)  Pulse:  [74] 74  Heart Rate:  [66-74] 66  Resp:  [12-18] 14  BP: ()/(57-71) 103/64  SpO2:  [98 %-100 %] 98 %  I/O last 3 completed shifts:  In: 2400 [I.V.:2400]  Out: 2400 [Urine:2400]  GEN: Lying in bed. Pleasant and cooperative. NAD  HEENT: Head NC/AT. Sclerae clear, PERRL, EOMs intact,  nares patent, OP with no obvious lesions, MMM, receding gumline with erythema near the right lower lateral incisor, no active bleeding  NECK: Supple. Small right sided cervical lymph node is palpable and painful to the touch.   CARD: Regular rate and rhythm. Normal S1 and S2, no murmurs, rubs or gallops. Cap refill < 2 sec  RESP: Lungs Clear to auscultation bilaterally. No increased work of breathing. No adventitious lung sounds.  ABD: Non-distended, non-tender, no organomegaly. Normal bowel sounds  EXTREM: No edema noted  SKIN: No rashes, bruising or petechiae. Port upper right chest, accessed.  NEURO: No focal deficits  ACCESS: Port upper right chest    Labs:  Labs reviewed, pertinent findings: Strep negative, WBC 1.1 with an ANC of 0, Hgb 8, platelets 32K. BUN 11, creatinine 0.75. 24% blasts, 0.3 absolute blasts.    COVID testing is NEGATIVE.     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 CD19 positive (dim). He is set to begin CAR-T (Kymriah) per MT2017-45 on July 21, 2020.      Admitted from [facility] on 7/17 due to fever. Cefepime initiated and blood cultures obtained. Cultures are negative to date and COVID test is negative. He has been afebrile since 7/17 at 2100. Ongoing poor oral intake, on IVF. Will consider starting TPN prior to discharge. Scheduled for CVC placement 7/20.      ALL and BMT: High risk B cell ALL (CNS negative) + JAK2 activation.  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  - Completed apheresis collection in the [facility] (6/2020).   - He has been receiving bridging chemotherapy with oral 6MP, methotrexate and ruxolitinib. All oral chemotherapy stopped on 7/12.   - BM this week showed 75% involvement with leukemia. CSF was negative.   - Lymphodepleting chemotherapy per MT2017-45 with cytoxan and fludarabine planned for 7/21-7/24 and CAR-T infusion (7/27) originally planned for outpatient. Will need to see how he does clinically in the next couple of days before deciding if this needs to be delayed.      GVHD:  No signs of GvHD to date.    Heme:   # Pancytopenia secondary to chemotherapy:  - Transfuse for hemoglobin < 7, platelets < 10,000, <50,000 pre-procedure for CVC placement on 7/20.  - New history of significant hives with platelet transfusions x last 4 transfusions. Premedicate with tylenol and benadryl for platelets and may need mid-med.  Add hydrocortisone to premedications.   - No premedications needed for RBC transfusions. Last RBC transfusion approximately two weeks ago.  - No GCSF     Infectious Disease:  # Febrile  Neutropenia: Continues to have fevers (last at 2100 on 7/17). May be related to underlying active leukemia, but need to rule out infection.   - Continue cefepime while awaiting blood culture results. Can discontinue when afebrile for >48 hours.   - Blood culture q 24 hr PRN with fevers > 100.4F     # Risk for infection given immunocompromised status: BMT ID labs pending   Active: See above  Prophylaxis:                                                                      - Viral prophylaxis: HD Valtrex starting 7/21 to continue through Day +30  - Fungal prophylaxis:Started  Micafungin at admission and continue through Day +30. History of photoxic drug eruption with voriconazole, avoid use.   - Bacterial prophylaxis: Cefepime with fever (see above)  - PCP prophylaxis: Bactrim to begin day +28     FEN/Renal:   # Risk for malnutrition: Difficulty with eating secondary to throat pain.   - Monitor nutritional intake  - Age appropriate diet   - Started D5 1/2NS at 100 mL/hour. Low thresh hold to start TPN if not eating. Consult dietician on 7/20.     # Risk for electrolyte abnormalities:  - Check daily electrolytes     # Risk for renal dysfunction and fluid overload:   - Monitor I/O's and daily weights  -  ml/min     # Risk for TLS:  - Start Allopurinol TID on 7/21     Pulmonary:  # Risk for pulmonary insufficiency: No current concerns. Normal chest and sinus CTs during work-up.      Cardiovascular:  Aaron-Parkinson-White Syndrome: diagnosed upon syncope in 02/2018. Asymptomatic, no interventions to date. No current concerns.  - EKG 7/15 revealed WPW with normal sinus rhythm.   - Echocardiogram 7/15 showed LVEF of 62%  - Cardiology to decide if they want to assess him while inpatient.      GI:   # Nausea:  - Scheduled medications: Kytril BID starting 7/21  - PRN medications: Benadryl PRN     # Acid reflux:  Protonix once daily. Continue TUMS prn     # Transaminitis:  Possibly related to recent chemotherapy. Will  follow.      Neuro:  # Mucositis/pain: Throat pain is improving  - Oxycodone 5 mg PO every 6 hours PRN   - Tramadol prn    Oral Hygiene:   # Receding gums, risk for infection  - Start chlorhexidine mouth wash      Derm:  Dermatology consultation 7/24. No current rash. He is no longer using creams.       Fertility:  Sperm collected pre chemotherapy for fertility preservation     Discharge Considerations: Expected lengths of hospitalization for patients with complications from stem cell transplant vary based on the complication(s) and severity(ies). A typical stay is 7 days.      The above plan of care was developed by and communicated to me by the Pediatric BMT attending physician, Candido Acuña, DO  Pediatric Heme/Onc & BMT Fellow  Pager: [phone]    BMT Attending Note:    Artemio Nino was seen and evaluated by me today.     Interval history: Over the past 24 hours, specific issues of note have included some improvement in the throat discomfort, although he is complaining of some gingival discomfort. There is some erythema, but no ulcerations or signs of an abscess. He has remained afebrile, and there are no positive blood cultures to date. He is to have his line placed tomorrow in preparation to moving forward in CAR T therapy. He will remain inpatient tonight, but may be discharged tomorrow, if all goes well. He may need TPN.  I have reviewed changes and data in the status over the last 24 hours, including the vital signs, medications and lab results.  I assisted in formulating a plan, which was discussed amongst the BMT team.  This plan was also shared with the family, as his mother was on the phone today during the visit; I answered all questions to the best of my ability.      My care coordination activities today include oversight of planned lab studies, medication changes and discussion with BMT team-members including nursing.    The total amount of time spent in the care of Artemio CELAYA  Trung today was >40 minutes, at least 50% of which was counseling and coordination of care.    Candido Joshua MD  Professor, Dept. Of Pediatrics  Division of Blood and Marrow Transplantation

## 2020-07-19 NOTE — PLAN OF CARE
([id number]) Afebrile.  Lungs clear, sating good on RA.  VSS.  Pt complained of a little throat/gum pain 3-5/10, PRN oxy given x1 right at end of shift.  Pt ate/drank a little this AM but not much else this afternoon/evening.  Good UO.  No stool during shift.  Hourly rounding completed. No family present at bedside.  Plan for line placement tomorrow.  Continue with POC.

## 2020-07-19 NOTE — PLAN OF CARE
Has been afebrile, all vital signs stable. Removed from isolation due to negative Covid 19 test results.  C/O throat pain at 6/10 on numbers scale. Received Oxycodone prn X 2 with minimal relief. Also C/O mouth pain around gums. Reviewed mouth care with patient. Continues to receive IV fluids and antibiotics. Father came for short visit.

## 2020-07-20 ENCOUNTER — ANESTHESIA EVENT (OUTPATIENT)
Dept: PEDIATRICS | Facility: CLINIC | Age: 20
End: 2020-07-20
Payer: COMMERCIAL

## 2020-07-20 ENCOUNTER — ANESTHESIA (OUTPATIENT)
Dept: PEDIATRICS | Facility: CLINIC | Age: 20
End: 2020-07-20
Payer: COMMERCIAL

## 2020-07-20 ENCOUNTER — HOSPITAL ENCOUNTER (OUTPATIENT)
Dept: INTERVENTIONAL RADIOLOGY/VASCULAR | Facility: CLINIC | Age: 20
Discharge: HOME | End: 2020-07-20
Attending: PEDIATRICS | Admitting: PHYSICIAN ASSISTANT
Payer: COMMERCIAL

## 2020-07-20 ENCOUNTER — HOME INFUSION (PRE-WILLOW HOME INFUSION) (OUTPATIENT)
Dept: PHARMACY | Facility: CLINIC | Age: 20
End: 2020-07-20

## 2020-07-20 ENCOUNTER — TELEPHONE (OUTPATIENT)
Dept: TRANSPLANT | Facility: CLINIC | Age: 20
End: 2020-07-20

## 2020-07-20 VITALS
RESPIRATION RATE: 14 BRPM | TEMPERATURE: 97.4 F | DIASTOLIC BLOOD PRESSURE: 55 MMHG | BODY MASS INDEX: 18.81 KG/M2 | SYSTOLIC BLOOD PRESSURE: 93 MMHG | OXYGEN SATURATION: 100 % | WEIGHT: 128.31 LBS | HEART RATE: 55 BPM

## 2020-07-20 DIAGNOSIS — C91.00 ALL (ACUTE LYMPHOCYTIC LEUKEMIA) (H): ICD-10-CM

## 2020-07-20 LAB
ABO + RH BLD: NORMAL
ABO + RH BLD: NORMAL
ACANTHOCYTES BLD QL SMEAR: SLIGHT
ALBUMIN SERPL-MCNC: 3.4 G/DL (ref 3.4–5)
ALT SERPL W P-5'-P-CCNC: 348 U/L (ref 0–70)
ANION GAP SERPL CALCULATED.3IONS-SCNC: 5 MMOL/L (ref 3–14)
ANISOCYTOSIS BLD QL SMEAR: SLIGHT
AST SERPL W P-5'-P-CCNC: 104 U/L (ref 0–45)
BASOPHILS # BLD AUTO: 0 10E9/L (ref 0–0.2)
BASOPHILS NFR BLD AUTO: 0 %
BILIRUB DIRECT SERPL-MCNC: 0.2 MG/DL (ref 0–0.2)
BILIRUB SERPL-MCNC: 0.8 MG/DL (ref 0.2–1.3)
BLASTS # BLD: 0.4 10E9/L
BLASTS BLD QL SMEAR: 26.1 %
BLD GP AB SCN SERPL QL: NORMAL
BLD PROD TYP BPU: NORMAL
BLD PROD TYP BPU: NORMAL
BLD UNIT ID BPU: 0
BLOOD BANK CMNT PATIENT-IMP: NORMAL
BLOOD PRODUCT CODE: NORMAL
BPU ID: NORMAL
BUN SERPL-MCNC: 11 MG/DL (ref 7–30)
CALCIUM SERPL-MCNC: 8.9 MG/DL (ref 8.5–10.1)
CHLORIDE SERPL-SCNC: 106 MMOL/L (ref 94–109)
CO2 SERPL-SCNC: 25 MMOL/L (ref 20–32)
CREAT SERPL-MCNC: 0.73 MG/DL (ref 0.66–1.25)
DIFFERENTIAL METHOD BLD: ABNORMAL
DONOR CYTOMEGALOVIRUS ABY: POSITIVE
DONOR HEP B CORE ABY: NONREACTIVE
DONOR HEP B SURF AGN: NONREACTIVE
DONOR HEPATITIS C ABY: NONREACTIVE
DONOR HTLV 1&2 ANTIBODY: NONREACTIVE
DONOR TREPONEMA PAL ABY: NONREACTIVE
ELLIPTOCYTES BLD QL SMEAR: ABNORMAL
EOSINOPHIL # BLD AUTO: 0 10E9/L (ref 0–0.7)
EOSINOPHIL NFR BLD AUTO: 0 %
ERYTHROCYTE [DISTWIDTH] IN BLOOD BY AUTOMATED COUNT: 14.5 % (ref 10–15)
GFR SERPL CREATININE-BSD FRML MDRD: >90 ML/MIN/{1.73_M2}
GLUCOSE SERPL-MCNC: 96 MG/DL (ref 70–99)
HCT VFR BLD AUTO: 26.2 % (ref 40–53)
HGB BLD-MCNC: 8.7 G/DL (ref 13.3–17.7)
HIV1+2 AB SERPL QL IA: NONREACTIVE
INR PPP: 1.09 (ref 0.86–1.14)
LYMPHOCYTES # BLD AUTO: 1 10E9/L (ref 0.8–5.3)
LYMPHOCYTES NFR BLD AUTO: 73.9 %
MAGNESIUM SERPL-MCNC: 2.1 MG/DL (ref 1.6–2.3)
MCH RBC QN AUTO: 27.6 PG (ref 26.5–33)
MCHC RBC AUTO-ENTMCNC: 33.2 G/DL (ref 31.5–36.5)
MCV RBC AUTO: 83 FL (ref 78–100)
MONOCYTES # BLD AUTO: 0 10E9/L (ref 0–1.3)
MONOCYTES NFR BLD AUTO: 0 %
MPX SERIES: NONREACTIVE
NEUTROPHILS # BLD AUTO: 0 10E9/L (ref 1.6–8.3)
NEUTROPHILS NFR BLD AUTO: 0 %
NUM BPU REQUESTED: 1
PHOSPHATE SERPL-MCNC: 4.2 MG/DL (ref 2.5–4.5)
PLATELET # BLD AUTO: 25 10E9/L (ref 150–450)
PLATELET # BLD AUTO: 62 10E9/L (ref 150–450)
PLATELET # BLD EST: ABNORMAL 10*3/UL
POIKILOCYTOSIS BLD QL SMEAR: ABNORMAL
POTASSIUM SERPL-SCNC: 4 MMOL/L (ref 3.4–5.3)
RBC # BLD AUTO: 3.15 10E12/L (ref 4.4–5.9)
RBC INCLUSIONS BLD: SLIGHT
SODIUM SERPL-SCNC: 136 MMOL/L (ref 133–144)
SPECIMEN EXP DATE BLD: NORMAL
T CRUZI AB SER DONR QL: NONREACTIVE
TRANSFUSION STATUS PATIENT QL: NORMAL
TRANSFUSION STATUS PATIENT QL: NORMAL
WBC # BLD AUTO: 1.4 10E9/L (ref 4–11)
WNV RNA SPEC QL NAA+PROBE: NONREACTIVE

## 2020-07-20 PROCEDURE — 27210902 ZZH KIT CR4

## 2020-07-20 PROCEDURE — 85025 COMPLETE CBC W/AUTO DIFF WBC: CPT | Performed by: PHYSICIAN ASSISTANT

## 2020-07-20 PROCEDURE — C1751 CATH, INF, PER/CENT/MIDLINE: HCPCS

## 2020-07-20 PROCEDURE — 25000132 ZZH RX MED GY IP 250 OP 250 PS 637: Performed by: PHYSICIAN ASSISTANT

## 2020-07-20 PROCEDURE — 0JH63XZ INSERTION OF TUNNELED VASCULAR ACCESS DEVICE INTO CHEST SUBCUTANEOUS TISSUE AND FASCIA, PERCUTANEOUS APPROACH: ICD-10-PCS | Performed by: PHYSICIAN ASSISTANT

## 2020-07-20 PROCEDURE — 25800025 ZZH RX 258: Performed by: PEDIATRICS

## 2020-07-20 PROCEDURE — 82248 BILIRUBIN DIRECT: CPT | Performed by: PHYSICIAN ASSISTANT

## 2020-07-20 PROCEDURE — 25000128 H RX IP 250 OP 636: Performed by: PHYSICIAN ASSISTANT

## 2020-07-20 PROCEDURE — P9037 PLATE PHERES LEUKOREDU IRRAD: HCPCS | Performed by: PEDIATRICS

## 2020-07-20 PROCEDURE — C1769 GUIDE WIRE: HCPCS

## 2020-07-20 PROCEDURE — 25800030 ZZH RX IP 258 OP 636: Performed by: NURSE ANESTHETIST, CERTIFIED REGISTERED

## 2020-07-20 PROCEDURE — 80069 RENAL FUNCTION PANEL: CPT | Performed by: PHYSICIAN ASSISTANT

## 2020-07-20 PROCEDURE — 02H633Z INSERTION OF INFUSION DEVICE INTO RIGHT ATRIUM, PERCUTANEOUS APPROACH: ICD-10-PCS | Performed by: PHYSICIAN ASSISTANT

## 2020-07-20 PROCEDURE — 25000128 H RX IP 250 OP 636: Performed by: NURSE PRACTITIONER

## 2020-07-20 PROCEDURE — 84460 ALANINE AMINO (ALT) (SGPT): CPT | Performed by: PHYSICIAN ASSISTANT

## 2020-07-20 PROCEDURE — 82247 BILIRUBIN TOTAL: CPT | Performed by: PHYSICIAN ASSISTANT

## 2020-07-20 PROCEDURE — 25800030 ZZH RX IP 258 OP 636: Performed by: PHYSICIAN ASSISTANT

## 2020-07-20 PROCEDURE — 25000125 ZZHC RX 250: Performed by: PHYSICIAN ASSISTANT

## 2020-07-20 PROCEDURE — 25000132 ZZH RX MED GY IP 250 OP 250 PS 637: Performed by: PEDIATRICS

## 2020-07-20 PROCEDURE — 36558 INSERT TUNNELED CV CATH: CPT | Mod: LT

## 2020-07-20 PROCEDURE — 84450 TRANSFERASE (AST) (SGOT): CPT | Performed by: PHYSICIAN ASSISTANT

## 2020-07-20 PROCEDURE — 86900 BLOOD TYPING SEROLOGIC ABO: CPT | Performed by: PHYSICIAN ASSISTANT

## 2020-07-20 PROCEDURE — 85610 PROTHROMBIN TIME: CPT | Performed by: PHYSICIAN ASSISTANT

## 2020-07-20 PROCEDURE — 25000128 H RX IP 250 OP 636: Performed by: PEDIATRICS

## 2020-07-20 PROCEDURE — 37000008 ZZH ANESTHESIA TECHNICAL FEE, 1ST 30 MIN: Performed by: PHYSICIAN ASSISTANT

## 2020-07-20 PROCEDURE — 83735 ASSAY OF MAGNESIUM: CPT | Performed by: PHYSICIAN ASSISTANT

## 2020-07-20 PROCEDURE — 25000128 H RX IP 250 OP 636: Performed by: NURSE ANESTHETIST, CERTIFIED REGISTERED

## 2020-07-20 PROCEDURE — 25000125 ZZHC RX 250: Performed by: NURSE ANESTHETIST, CERTIFIED REGISTERED

## 2020-07-20 PROCEDURE — 37000009 ZZH ANESTHESIA TECHNICAL FEE, EACH ADDTL 15 MIN: Performed by: PHYSICIAN ASSISTANT

## 2020-07-20 PROCEDURE — 86901 BLOOD TYPING SEROLOGIC RH(D): CPT | Performed by: PHYSICIAN ASSISTANT

## 2020-07-20 PROCEDURE — 40001011 ZZH STATISTIC PRE-PROCEDURE NURSING ASSESSMENT: Performed by: PHYSICIAN ASSISTANT

## 2020-07-20 PROCEDURE — 85049 AUTOMATED PLATELET COUNT: CPT | Performed by: PEDIATRICS

## 2020-07-20 PROCEDURE — 40000165 ZZH STATISTIC POST-PROCEDURE RECOVERY CARE: Performed by: PHYSICIAN ASSISTANT

## 2020-07-20 PROCEDURE — 86850 RBC ANTIBODY SCREEN: CPT | Performed by: PHYSICIAN ASSISTANT

## 2020-07-20 RX ORDER — PROPOFOL 10 MG/ML
INJECTION, EMULSION INTRAVENOUS PRN
Status: DISCONTINUED | OUTPATIENT
Start: 2020-07-20 | End: 2020-07-20

## 2020-07-20 RX ORDER — HEPARIN SODIUM,PORCINE 10 UNIT/ML
4 VIAL (ML) INTRAVENOUS ONCE
Status: COMPLETED | OUTPATIENT
Start: 2020-07-20 | End: 2020-07-20

## 2020-07-20 RX ORDER — HEPARIN SODIUM,PORCINE 10 UNIT/ML
3-6 VIAL (ML) INTRAVENOUS EVERY 24 HOURS
Status: DISCONTINUED | OUTPATIENT
Start: 2020-07-20 | End: 2020-07-20 | Stop reason: HOSPADM

## 2020-07-20 RX ORDER — CHLORHEXIDINE GLUCONATE ORAL RINSE 1.2 MG/ML
15 SOLUTION DENTAL 2 TIMES DAILY
Qty: 473 ML | Refills: 3 | Status: SHIPPED | OUTPATIENT
Start: 2020-07-20 | End: 2020-09-25

## 2020-07-20 RX ORDER — HEPARIN SODIUM,PORCINE 10 UNIT/ML
5-10 VIAL (ML) INTRAVENOUS
Status: CANCELLED | OUTPATIENT
Start: 2020-07-20

## 2020-07-20 RX ORDER — HEPARIN SODIUM,PORCINE 10 UNIT/ML
VIAL (ML) INTRAVENOUS
Status: DISCONTINUED
Start: 2020-07-20 | End: 2020-07-20 | Stop reason: HOSPADM

## 2020-07-20 RX ORDER — EPHEDRINE SULFATE 50 MG/ML
INJECTION, SOLUTION INTRAMUSCULAR; INTRAVENOUS; SUBCUTANEOUS PRN
Status: DISCONTINUED | OUTPATIENT
Start: 2020-07-20 | End: 2020-07-20

## 2020-07-20 RX ORDER — PROPOFOL 10 MG/ML
INJECTION, EMULSION INTRAVENOUS CONTINUOUS PRN
Status: DISCONTINUED | OUTPATIENT
Start: 2020-07-20 | End: 2020-07-20

## 2020-07-20 RX ORDER — HEPARIN SODIUM,PORCINE 10 UNIT/ML
5 VIAL (ML) INTRAVENOUS EVERY 24 HOURS
Status: CANCELLED | OUTPATIENT
Start: 2020-07-20

## 2020-07-20 RX ORDER — HEPARIN SODIUM (PORCINE) LOCK FLUSH IV SOLN 100 UNIT/ML 100 UNIT/ML
5 SOLUTION INTRAVENOUS
Status: DISCONTINUED | OUTPATIENT
Start: 2020-07-20 | End: 2020-07-20 | Stop reason: HOSPADM

## 2020-07-20 RX ORDER — SODIUM CHLORIDE, SODIUM LACTATE, POTASSIUM CHLORIDE, CALCIUM CHLORIDE 600; 310; 30; 20 MG/100ML; MG/100ML; MG/100ML; MG/100ML
INJECTION, SOLUTION INTRAVENOUS CONTINUOUS PRN
Status: DISCONTINUED | OUTPATIENT
Start: 2020-07-20 | End: 2020-07-20

## 2020-07-20 RX ORDER — HEPARIN SODIUM,PORCINE 10 UNIT/ML
3-6 VIAL (ML) INTRAVENOUS
Status: DISCONTINUED | OUTPATIENT
Start: 2020-07-20 | End: 2020-07-20 | Stop reason: HOSPADM

## 2020-07-20 RX ORDER — DIPHENHYDRAMINE HYDROCHLORIDE 50 MG/ML
50 INJECTION INTRAMUSCULAR; INTRAVENOUS EVERY 6 HOURS PRN
Status: DISCONTINUED | OUTPATIENT
Start: 2020-07-20 | End: 2020-07-20 | Stop reason: HOSPADM

## 2020-07-20 RX ORDER — LIDOCAINE HYDROCHLORIDE 10 MG/ML
1-10 INJECTION, SOLUTION EPIDURAL; INFILTRATION; INTRACAUDAL; PERINEURAL ONCE
Status: COMPLETED | OUTPATIENT
Start: 2020-07-20 | End: 2020-07-20

## 2020-07-20 RX ADMIN — HEPARIN 5 ML: 100 SYRINGE at 15:10

## 2020-07-20 RX ADMIN — Medication 5 MG: at 11:44

## 2020-07-20 RX ADMIN — DEXTROSE MONOHYDRATE AND SODIUM CHLORIDE: 5; .45 INJECTION, SOLUTION INTRAVENOUS at 06:10

## 2020-07-20 RX ADMIN — HEPARIN, PORCINE (PF) 10 UNIT/ML INTRAVENOUS SYRINGE 4 ML: at 12:02

## 2020-07-20 RX ADMIN — HEPARIN, PORCINE (PF) 10 UNIT/ML INTRAVENOUS SYRINGE 5 ML: at 12:37

## 2020-07-20 RX ADMIN — DIPHENHYDRAMINE HYDROCHLORIDE 50 MG: 50 INJECTION, SOLUTION INTRAMUSCULAR; INTRAVENOUS at 01:35

## 2020-07-20 RX ADMIN — PHENYLEPHRINE HYDROCHLORIDE 50 MCG: 10 INJECTION INTRAVENOUS at 11:47

## 2020-07-20 RX ADMIN — PROPOFOL 200 MCG/KG/MIN: 10 INJECTION, EMULSION INTRAVENOUS at 11:27

## 2020-07-20 RX ADMIN — CEFAZOLIN SODIUM 1.5 G: 10 INJECTION, POWDER, FOR SOLUTION INTRAVENOUS at 11:51

## 2020-07-20 RX ADMIN — PANTOPRAZOLE SODIUM 40 MG: 40 TABLET, DELAYED RELEASE ORAL at 08:24

## 2020-07-20 RX ADMIN — HYDROCORTISONE SODIUM SUCCINATE 50 MG: 100 INJECTION, POWDER, FOR SOLUTION INTRAMUSCULAR; INTRAVENOUS at 01:36

## 2020-07-20 RX ADMIN — LIDOCAINE HYDROCHLORIDE 5 ML: 10 INJECTION, SOLUTION EPIDURAL; INFILTRATION; INTRACAUDAL; PERINEURAL at 12:02

## 2020-07-20 RX ADMIN — Medication 5 MG: at 11:42

## 2020-07-20 RX ADMIN — SODIUM CHLORIDE, POTASSIUM CHLORIDE, SODIUM LACTATE AND CALCIUM CHLORIDE: 600; 310; 30; 20 INJECTION, SOLUTION INTRAVENOUS at 11:27

## 2020-07-20 RX ADMIN — MICAFUNGIN SODIUM 50 MG: 10 INJECTION, POWDER, LYOPHILIZED, FOR SOLUTION INTRAVENOUS at 13:57

## 2020-07-20 RX ADMIN — HEPARIN, PORCINE (PF) 10 UNIT/ML INTRAVENOUS SYRINGE 3 ML: at 15:10

## 2020-07-20 RX ADMIN — CHLORHEXIDINE GLUCONATE 0.12% ORAL RINSE 15 ML: 1.2 LIQUID ORAL at 08:24

## 2020-07-20 RX ADMIN — SODIUM CHLORIDE, POTASSIUM CHLORIDE, SODIUM LACTATE AND CALCIUM CHLORIDE: 600; 310; 30; 20 INJECTION, SOLUTION INTRAVENOUS at 12:00

## 2020-07-20 RX ADMIN — ACETAMINOPHEN 650 MG: 325 TABLET, FILM COATED ORAL at 00:40

## 2020-07-20 RX ADMIN — PROPOFOL 50 MG: 10 INJECTION, EMULSION INTRAVENOUS at 11:27

## 2020-07-20 NOTE — SUMMARY OF CARE
BMT Pediatric Summary of Care    July 20, 2020 7:52 AM  Artemio Nino  MRN: [id number]    Discharge Date: 7/20/20    BMT Primary Physician: Viky Zavala     BMT Nurse Coordinator:  Daria Jara    Discharge Diagnosis: Neutropenic fevers    Discharge To: Local Residence    Activity: As tolerated    Catheter Care: Galvan    Vascular Access Device Protocol Per Policy  Supplies through Home [facility] (Please supply central line dressing kits for weekly dressing changes).  [facility]  Fax: [phone]  Ph: [phone]       IV Medications through home infusion: IV micafungin    Nutrition: TPN/IL-see separate orders for formula    Blood Transfusions:  Transfuse if Hemoglobin < or equal 7 mg/dL  Red Blood Cell Order: 1 pRBCs, irradiated and leukoreduced   Transfuse if Platelet count < or equal 10 uL  Platelet order: 1 adult dose, irradiated and leukoreduced  Transfusion Pre-meds:  IV Hydrocortisone 50 mg BID PRN  PO Benadryl 50 mg Q6H PRN  No premedications needed for RBC transfusions.    Outpatient Pharmacy:  None    Laboratory Tests:  At next clinic appointment (date: 7/22/20)  CMP  CBC with diff    Support Services:  None    Appointments:   BMT Clinic (date, time, provider): 7/21/20, 9 am, IMTIAZ Talamantes MD

## 2020-07-20 NOTE — PLAN OF CARE
AF. VSS. LSC. No pain. No nausea. Galvan line placed. Stabel upon return to unit. Hep locked port. Discharge teaching done. Dc'd home.

## 2020-07-20 NOTE — PROGRESS NOTES
Infusion Therapy-[facility] Nurse Liaison-Informational Meeting  Met with Patient at bedside. Pt is expected to D/C later today. Pt and dad have done [facility] before, but will need refresher teaching.  Provided info on [facility] Services, RN visits, RPH/RN on call 24/7, supplies, and delivery process, storage of medications,  refrigerating medications upon arrival form .  Educated on how to contact [facility]. Pt in agreement with plan and willing and able to learn. Pt states his dad will be [facility]/Micafungin education. Patient lives alone, but his dad is close by. He has not been to [facility].  This liaison will continue to follow until DC for any changes or additional needs.  THERAPIES:  TPN-cycled, Micafungin q 24hrs, will have today prior to DC, CLC wkly  CVC:  JEFF Galvan, PORT will be de-accessed  ANTICIPATED DATE: Late today  DELIVERY/SUPPLIES: to pt residence  AGENCY:  [facility]: [facility] at 7:30pm  NOTE: Pt lives alone, his dad is nearby. Dad will be at the [facility] this vielka for TPN education    Carline Boyer [facility]-Nurse Liaison  Cell:  [phone] Mon-Fri  [email or web address]  [facility]-24 hrs  [phone]

## 2020-07-20 NOTE — CONSULTS
Laboratory Medicine and Pathology  Transfusion Medicine- Transfusion Reaction    Artemio Nino MRN# [id number]   YOB: 2000 Age: 20 year old   Date of Reaction: 2020       Transfusion Reaction Evaluation   Impression  - Minor allergic reaction    Recommendation    1. Transfuse as needed.    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    He was transfused one unit of irradiated apheresis platelets (unit# [id number]) in the [facility] on 2020 at 1114. After the transfusion was completed, he was noted to have hives on his chin. He was treated with tylenol and benadryl pre-medications and an additional dose of benadryl mid-infusion. His hives resolved.     He was noted to be febrile (100.8 F) on arrival to the clinic prior to the transfusion. Blood cultures and Cefepime were taken/given in the clinic. He was admitted to the hospital after his clinic visit due to his fever.    Reported Symptoms      Blood Bank Investigation  Product Type: Irradiated apheresis platelet  Unit Number: [id number]  Amount Remainin mL  Post-Transfusion Clerical Check: Correct  ABO/Rh: The unit type was A+ and the patient was A-. The unit was compatible.    [facility] Hemovigilance  Case Definition: Definitive  Severity: Non-severe  Imputability: Definite      Tony Baker MD  2020 1:53 PM  Transfusion Medicine Fellow  Pager: [phone]

## 2020-07-20 NOTE — PROCEDURES
[facility], [place]    Procedure: IR CVC TUNNEL PLACEMENT LEFT    Date/Time: 7/20/2020 12:12 PM  Performed by: Pato Gómez PA-C  Authorized by: Pato óGmez PA-C     UNIVERSAL PROTOCOL   Site Marked: Yes  Prior Images Obtained and Reviewed:  Yes  Required items: Required blood products, implants, devices and special equipment available    Patient identity confirmed:  Hospital-assigned identification number, provided demographic data, arm band and verbally with patient  Patient was reevaluated immediately before administering moderate or deep sedation or anesthesia (Per anesthesia)  Confirmation Checklist:  Patient's identity using two indicators, relevant allergies and procedure was appropriate and matched the consent or emergent situation  Time out: Immediately prior to the procedure a time out was called    Universal Protocol: the Joint Commission Universal Protocol was followed    Preparation: Patient was prepped and draped in usual sterile fashion    ESBL (mL):  2         ANESTHESIA    Anesthesia: Local infiltration  Local Anesthetic:  Lidocaine 1% without epinephrine  Anesthetic Total (mL):  5      SEDATION    Patient Sedated: Yes    Sedation Type:  Deep (native airway general)  Sedation:  See MAR for details  Vital signs: Vital signs monitored during sedation    Fluoroscopy Time: 1 minute(s)  See dictated procedure note for full details.  Findings: Tolerated well    Specimens: none    Complications: None    Condition: Stable    Plan: TCVC heparin locked and ready for immediate use.    PROCEDURE   Patient Tolerance:  Patient tolerated the procedure well with no immediate complications  Describe Procedure: 9.5 Fr 27.5 cm double lumen tunneled central venous catheter via left IJ (port on right) with tip in right atrium. Functions well, heparin locked and ready for immediate use.   Monitored anesthesia care in UR IR 1  Length of time physician/provider present for 1:1  monitoring during sedation: 0

## 2020-07-20 NOTE — PLAN OF CARE
Afebrile. VSS. Lungs clear. Complained of throat/mouth pain, rated a 4. Declined intervention at that time. NPO at 0300 for procedure this morning. Voiding. No stool. Platelets replaced with tylenol, benadryl and solu-cortef premeds. Tolerated well. Hourly rounding completed.

## 2020-07-20 NOTE — TELEPHONE ENCOUNTER
Prior Authorization Approval    Authorization Effective Date: 6/20/2020  Authorization Expiration Date: 7/20/2021  Medication: Valacyclovir 1000mg tabs  Approved Dose/Quantity: 90 for 30 days  Insurance Company: [employer] - Phone [phone] Fax [phone]

## 2020-07-20 NOTE — ANESTHESIA PREPROCEDURE EVALUATION
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]   Procedure(s):  NON -APHERESIS Double lumen tunneled central burns line placement     LABS:  CBC:   Lab Results   Component Value Date    WBC 1.4 (L) 2020    WBC 1.1 (L) 2020    HGB 8.7 (L) 2020    HGB 8.0 (L) 2020    HCT 26.2 (L) 2020    HCT 23.6 (L) 2020    PLT 62 (L) 2020    PLT 25 (LL) 2020     BMP:   Lab Results   Component Value Date     2020     2020    POTASSIUM 4.0 2020    POTASSIUM 3.9 2020    CHLORIDE 106 2020    CHLORIDE 107 2020    CO2 25 2020    CO2 25 2020    BUN 11 2020    BUN 11 2020    CR 0.73 2020    CR 0.75 2020    GLC 96 2020     (H) 2020     COAGS:   Lab Results   Component Value Date    PTT 35 07/15/2020    INR 1.09 2020    FIBR 475 (H) 07/15/2020     POC: No results found for: BGM, HCG, HCGS  OTHER:   Lab Results   Component Value Date    ELSA 8.9 2020    PHOS 4.2 2020    MAG 2.1 2020    ALBUMIN 3.4 2020    PROTTOTAL 7.0 2020     (H) 2020     (H) 2020    ALKPHOS 60 2020    BILITOTAL 0.8 2020    TSH 4.20 (H) 2019    T4 0.88 2019        Preop Vitals    BP Readings from Last 3 Encounters:   20 97/64   20 (!) 88/60   07/15/20 114/80    Pulse Readings from Last 3 Encounters:   20 60   20 61   07/15/20 96      Resp Readings from Last 3 Encounters:   20 16   20 16   07/15/20 20    SpO2 Readings from Last 3 Encounters:   20 99%   20 100%   07/15/20 99%      Temp Readings from Last 1 Encounters:   20 36.6  C (97.8  F) (Axillary)    Ht Readings from Last 1 Encounters:   20 1.759 m (5'  "9.25\")      Wt Readings from Last 1 Encounters:   07/19/20 58.2 kg (128 lb 4.9 oz)    Estimated body mass index is 18.81 kg/m  as calculated from the following:    Height as of 7/17/20: 1.759 m (5' 9.25\").    Weight as of 7/19/20: 58.2 kg (128 lb 4.9 oz).     LDA:  Port A Cath Single 06/10/20 Right Chest wall (Active)   Access Date 07/17/20 07/20/20 0800   Access Attempts 1 07/17/20 0800   Gauge Power noncoring 90 degree bend 07/17/20 0800   Site Assessment WDL 07/20/20 0800   Line Status Infusing 07/20/20 0800   Extravasation? No 07/20/20 0400   Dressing Intervention Transparent 07/17/20 0800   Needle Change Due 07/24/20 07/20/20 0800   Line Necessity Yes, meets criteria 07/20/20 0800   Number of days: 40        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)      History of blood transfusion      WPW (Aaron-Parkinson-White syndrome) 03/2018      Past Surgical History:   Procedure Laterality Date     BONE MARROW BIOPSY, BONE SPECIMEN, NEEDLE/TROCAR Right 11/27/2018    Procedure: bone marrow biopsy;  Surgeon: Jacqueline Fitzgerald NP;  Location: [facility]      BONE MARROW BIOPSY, BONE SPECIMEN, NEEDLE/TROCAR N/A 2/8/2019    Procedure: BIOPSY BONE MARROW;  Surgeon: Lisa Workman NP;  Location: [facility]      BONE MARROW BIOPSY, BONE SPECIMEN, NEEDLE/TROCAR N/A 5/16/2019    Procedure: BIOPSY, BONE MARROW;  Surgeon: Lilia López APRN CNP;  Location: [facility]     BONE MARROW BIOPSY, BONE SPECIMEN, NEEDLE/TROCAR N/A 11/7/2019    Procedure: Bone marrow biopsy;  Surgeon: Jacqueline Shin NP;  Location: [facility]      BONE MARROW BIOPSY, BONE SPECIMEN, NEEDLE/TROCAR Right 6/10/2020    Procedure: BIOPSY, BONE MARROW;  Surgeon: Candido Han PA-C;  Location: [facility]      ESOPHAGOSCOPY, GASTROSCOPY, DUODENOSCOPY (EGD), COMBINED N/A 2/22/2019    Procedure: Upper endoscopy with biopsy;  Surgeon: Magdalene Hobson MD;  Location: [facility]      " INSERT CATHETER VASCULAR ACCESS APHERESIS CHILD N/A 6/10/2020    Procedure: Large Bore Double Lumen NON TUNNELED Apheresis Catheter placement;  Surgeon: Link Rios PA-C;  Location: [facility]      INSERT CATHETER VASCULAR ACCESS DOUBLE LUMEN CHILD N/A 10/26/2018    Procedure: double lumen tunneled line placement;  Surgeon: Rex Valente MD;  Location: [facility]      INSERT PORT VASCULAR ACCESS N/A 6/10/2020    Procedure: Port placement;  Surgeon: Link Rios PA-C;  Location: [facility]      IR CHEST PORT PLACEMENT > 5 YRS OF AGE  6/10/2020     IR CVC NON TUNNEL LINE REMOVAL  6/12/2020     IR CVC NON TUNNEL PLACEMENT  6/10/2020     IR CVC TUNNEL REMOVAL LEFT  2/8/2019     IR PORT REMOVAL LEFT  5/16/2019     O CLAVICLE LEFT Left     fracture with surgical repair and plate/screws     ORTHOPEDIC SURGERY      femur     REMOVE CATHETER VASCULAR ACCESS N/A 2/8/2019    Procedure: Tunneled line removal;  Surgeon: Krystal Esparza MD;  Location: [facility]      REMOVE PORT VASCULAR ACCESS N/A 5/16/2019    Procedure: REMOVAL, VASCULAR ACCESS PORT;  Surgeon: Link Rios PA-C;  [facility]      Allergies   Allergen Reactions     Blood Transfusion Related (Informational Only) Other (See Comments)     Patient has a history of a clinically significant antibody against RBC antigens.  A delay in compatible RBCs may occur.Stem cell transplant patient.  Give type O RBCs.  Requires Benadryl as premed for platelets for history of hives     Voriconazole Photosensitivity     Significant rash - do not rechallenge        Anesthesia Evaluation     . Pt has had prior anesthetic.     No history of anesthetic complications          ROS/MED HX    ENT/Pulmonary:  - neg pulmonary ROS    (-) asthma and Other pulmonary disease   Neurologic:  - neg neurologic ROS     Cardiovascular: Comment: WPW 2/18 - one episode of syncope--patient denies was due to WPW    ziopath 10/18:  Cannot  rule out pre-excitation at higher HR.    CONCLUSIONS------7/15/2020  Patient after bone marrow transplant. Normal echocardiogram. The left and  right ventricles have normal chamber size, wall thickness, and systolic  function. The calculated biplane left ventricular ejection fraction is 62 %.  No pericardial effusion.      (+) ----. : . . . :. . Previous cardiac testing Echodate:9/19results:Normal echocardiogram. There is normal appearance and motion of the tricuspid,  mitral, pulmonary and aortic valves. No atrial, ventricular or arterial level  shunting. The calculated biplane left ventricular ejection fraction is 65-70%.  Normal right and left ventricular size and function.date: results: date: results: date: results:          METS/Exercise Tolerance:  >4 METS   Hematologic:     (+) Anemia, History of Transfusion Other Hematologic Disorder (thrombocytopenia)-pancytopenia      Musculoskeletal:  - neg musculoskeletal ROS       GI/Hepatic:     (+) GERD Asymptomatic on medication,       Renal/Genitourinary:  - ROS Renal section negative       Endo:  - neg endo ROS       Psychiatric:  - neg psychiatric ROS       Infectious Disease:  - neg infectious disease ROS       Malignancy:   (+) Malignancy History of Lymphoma/Leukemia  Lymph CA Active status post, 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 Rate: Normal     Resp. Effort: Normal      CV: Rhythm: Regular  Rate: Age appropriate  Heart: Normal Sounds  Edema: None   Comments:      Dental: Normal Dentition                Assessment:   ASA SCORE: 3    H&P: History and physical reviewed and following examination; no interval change.   Smoking Status:   Non-Smoker/Unknown   NPO Status: NPO Appropriate     Plan:   Anes. Type:  MAC   Pre-Medication: None   Induction:  IV (Standard)   Airway: Native Airway   Access/Monitoring: PIV   Maintenance: Propofol Sedation     Postop Plan:   Postop Pain: None  Postop Sedation/Airway: Not planned  Disposition: Outpatient     PONV Management:   Adult Risk Factors:, Non-Smoker   Prevention:, Propofol     CONSENT: Direct conversation   Plan and risks discussed with: Patient; Father   Blood Products: Consent Deferred (Minimal Blood Loss)       Comments for Plan/Consent:  MAC with propofol  Risks versus benefits discussed. All questions answered                     Jair Rhodes MD

## 2020-07-20 NOTE — PROGRESS NOTES
CLINICAL NUTRITION SERVICES - PEDIATRIC ASSESSMENT NOTE    REASON FOR ASSESSMENT  Artemio Nino is a 20 year old male seen by the dietitian for Positive risk screen and PN/start manage consult    ANTHROPOMETRICS  Height/Length: 175.9 cm  Weight: 58.4 kg  BMI: 18.81 kg/m2- BMI indicates underweight status  Comments: Weight in October of 2018 was 65.1 kg- This is a 10% weight loss.    NUTRITION HISTORY  Patient is on a Regular diet at home.  Typical food/fluid intake is decreased due to throat pain with sores in throat and gum discomfort  Information obtained from Chart  Factors affecting nutrition intake include: sores in throat and pain    CURRENT NUTRITION ORDERS  Diet:Regular    PHYSICAL FINDINGS  Obtained from Chart/Interdisciplinary Team  Sores in throat, pain, weight loss    LABS  Labs reviewed    MEDICATIONS  Medications reviewed    ASSESSED NUTRITION NEEDS:  Estimated Energy Needs: 30-35 kcal/kg  Estimated Protein Needs: 1.5 g/kg  Estimated Fluid Needs: 1 mL/kcal    NUTRITION STATUS VALIDATION  Will do physical assessment when able    NUTRITION DIAGNOSIS:  Inadequate oral intake related to throat sores and pain as evidence by decreased po and wt loss.    INTERVENTIONS  Nutrition Prescription  Po/nutrition support to meet needs for age appropriate growth    Nutrition Education:   Pt scheduled to be seen tomorrow as an outpatient and thus will provide education at that time.    Implementation:  Parenteral nutrition and Collaboration and Referral of Nutrition care- Pt discussed in rounds.  Plan for discharge later today but would like to start some supplemental PN at home.  Currently getting D5 with a GIR of 1.4.  Discussed starting 12 hour PN with GIR of 2 mg/kg/min (85 g dextrose), 1.5 g/kg protein and 250 mL lipids to provide 1141 kcals (20 kcal/kg).      Goals  1. Po and/or nutrition support to meet greater than 75% of needs  2. Weight maintenance during hospital stay    FOLLOW UP/MONITORING  Food and  Beverage intake- monitor po, Enteral and parenteral nutrition intake- follow for need and Anthropometric measurements- monitor wt    RECOMMENDATIONS  Plan to see pt as an outpatient tomorrow and will adjust PN as needed.    Renetta Schwab, RD, LD, [facility]  [phone]

## 2020-07-20 NOTE — PHARMACY
Outpatient IV Medication Monitoring:    Artemio will be discharging from [facility] 7/20 and is on the following IV medications:    1. *New* Micafungin 50 mg Q24H - will receive 7/20 dose inpatient  2. *New* TPN (see formula below)    Artemio's labs and nutritional status were reviewed for a new start TPN.      The following reflects the new TPN formula.  Dosing Weight: 58.4 kg  Volume: 1200 mL, cycled over 12 hours  Protein: 1.5 g/kg  Dextrose: 85 g , GIR = 2  Lipids: 250 mL    Sodium: 45 mEq/day  Potassium: 0 mEq/day  Calcium: 5 mEq/day  Magnesium 5 mEq/day  Phosphorus: 5 mMol/day  Chloride:Acetate ratio: 1:1  Trace elements with Selenium: std  Multivitamins: 10 mL    This was discussed with Candido Joshua MD and communicated to [facility].    Artemio will return to clinic Tuesday 7/21 for labs and reassessment.    Pharmacy will continue to follow.  Sherice Henson, AndreD

## 2020-07-20 NOTE — ANESTHESIA POSTPROCEDURE EVALUATION
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     Anesthesia Type: MAC       Disposition: Admission   Postop Pain Control: Uneventful            Sign Out: Well controlled pain   PONV: No   Neuro/Psych: Uneventful            Sign Out: Acceptable/Baseline neuro status   Airway/Respiratory: Uneventful            Sign Out: AIRWAY IN SITU/Resp. Support   CV/Hemodynamics: Uneventful            Sign Out: Acceptable CV status   Other NRE: NONE   DID A NON-ROUTINE EVENT OCCUR? No         Last Anesthesia Record Vitals:  CRNA VITALS  2020 1143 - 2020 1243      2020             NIBP:  98/59    Pulse:  58    Temp:  36.3  C (97.3  F)    SpO2:  100 %    Resp Rate (observed):  17          Last PACU Vitals:  Vitals Value Taken Time   BP 98/59 2020 12:15 PM   Temp 36.3  C (97.3  F) 2020 12:15 PM   Pulse 62 2020 12:15 PM   Resp 18 2020 12:15 PM   SpO2 100 % 2020 12:15 PM   Temp src     NIBP 91/58 2020 12:15 PM   Pulse 60 2020 12:15 PM   SpO2 100 % 2020 12:15 PM   Resp     Temp     Ht Rate     Temp 2           Electronically Signed By: Jair Rhodes MD, 2020, 4:08 PM

## 2020-07-20 NOTE — DISCHARGE INSTRUCTIONS
BMT Pediatric Summary of Care    July 20, 2020 7:52 AM  Artemio Nino  MRN: [id number]    Discharge Date: 7/20/20    BMT Primary Physician: Viky Zavala     BMT Nurse Coordinator:  Daria Jara    Discharge Diagnosis: Neutropenic fevers    Discharge To: Local Residence    Activity: As tolerated    Catheter Care: Galvan    Vascular Access Device Protocol Per Policy  Supplies through Home Infusion (Please supply central line dressing kits for weekly dressing changes).  [facility]  Fax: [phone]  Ph: [phone]       IV Medications through home infusion: IV micafungin    Nutrition: TPN/IL-see separate orders for formula    Blood Transfusions:  Transfuse if Hemoglobin < or equal 7 mg/dL  Red Blood Cell Order: 1 pRBCs, irradiated and leukoreduced   Transfuse if Platelet count < or equal 10 uL  Platelet order: 1 adult dose, irradiated and leukoreduced  Transfusion Pre-meds:  IV Hydrocortisone 50 mg BID PRN  PO Benadryl 50 mg Q6H PRN  No premedications needed for RBC transfusions.    Outpatient Pharmacy:  None    Laboratory Tests:  At next clinic appointment (date: 7/22/20)  CMP  CBC with diff    Support Services:  None    Appointments:   BMT Clinic (date, time, provider): 7/21/20, 9 am, IMTIAZ Talamantes MD

## 2020-07-20 NOTE — ANESTHESIA CARE TRANSFER NOTE
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    Anesthesia Type:   MAC     Note:  Airway :Face Mask  Patient transferred to: Recovery  Handoff Report: Identifed the Patient, Identified the Reponsible Provider, Reviewed the pertinent medical history, Discussed the surgical course, Reviewed Intra-OP anesthesia mangement and issues during anesthesia, Set expectations for post-procedure period and Allowed opportunity for questions and acknowledgement of understanding      Vitals: (Last set prior to Anesthesia Care Transfer)    CRNA VITALS  7/20/2020 1143 - 7/20/2020 1225      7/20/2020             NIBP:  98/59    Pulse:  58    Temp:  36.3  C (97.3  F)    SpO2:  100 %    Resp Rate (observed):  17                Electronically Signed By: SNOW Ballard CRNA  July 20, 2020  12:25 PM

## 2020-07-21 ENCOUNTER — INFUSION THERAPY VISIT (OUTPATIENT)
Dept: INFUSION THERAPY | Facility: CLINIC | Age: 20
End: 2020-07-21
Attending: PEDIATRICS
Payer: COMMERCIAL

## 2020-07-21 ENCOUNTER — ONCOLOGY VISIT (OUTPATIENT)
Dept: TRANSPLANT | Facility: CLINIC | Age: 20
End: 2020-07-21
Attending: NURSE PRACTITIONER
Payer: COMMERCIAL

## 2020-07-21 ENCOUNTER — HOME INFUSION (PRE-WILLOW HOME INFUSION) (OUTPATIENT)
Dept: PHARMACY | Facility: CLINIC | Age: 20
End: 2020-07-21

## 2020-07-21 ENCOUNTER — ALLIED HEALTH/NURSE VISIT (OUTPATIENT)
Dept: TRANSPLANT | Facility: CLINIC | Age: 20
End: 2020-07-21
Attending: DIETITIAN, REGISTERED
Payer: COMMERCIAL

## 2020-07-21 ENCOUNTER — MEDICAL CORRESPONDENCE (OUTPATIENT)
Dept: TRANSPLANT | Facility: CLINIC | Age: 20
End: 2020-07-21

## 2020-07-21 VITALS
HEIGHT: 69 IN | WEIGHT: 131.84 LBS | HEART RATE: 80 BPM | OXYGEN SATURATION: 100 % | SYSTOLIC BLOOD PRESSURE: 98 MMHG | RESPIRATION RATE: 16 BRPM | DIASTOLIC BLOOD PRESSURE: 52 MMHG | TEMPERATURE: 98.9 F | BODY MASS INDEX: 19.53 KG/M2

## 2020-07-21 DIAGNOSIS — C91.01 ACUTE LYMPHOBLASTIC LEUKEMIA (ALL) IN REMISSION (H): ICD-10-CM

## 2020-07-21 DIAGNOSIS — C91.02 ALL (ACUTE LYMPHOID LEUKEMIA) IN RELAPSE (H): Primary | ICD-10-CM

## 2020-07-21 DIAGNOSIS — C91.02 ACUTE LYMPHOBLASTIC LEUKEMIA (ALL) IN RELAPSE (H): Primary | ICD-10-CM

## 2020-07-21 LAB
ALBUMIN SERPL-MCNC: 3.4 G/DL (ref 3.4–5)
ALP SERPL-CCNC: 92 U/L (ref 40–150)
ALT SERPL W P-5'-P-CCNC: 258 U/L (ref 0–70)
ANION GAP SERPL CALCULATED.3IONS-SCNC: 4 MMOL/L (ref 3–14)
AST SERPL W P-5'-P-CCNC: 53 U/L (ref 0–45)
BASOPHILS # BLD AUTO: 0 10E9/L (ref 0–0.2)
BASOPHILS NFR BLD AUTO: 0 %
BILIRUB SERPL-MCNC: 0.5 MG/DL (ref 0.2–1.3)
BLASTS # BLD: 0.4 10E9/L
BLASTS BLD QL SMEAR: 27.8 %
BUN SERPL-MCNC: 20 MG/DL (ref 7–30)
CALCIUM SERPL-MCNC: 9.1 MG/DL (ref 8.5–10.1)
CHLORIDE SERPL-SCNC: 109 MMOL/L (ref 94–109)
CO2 SERPL-SCNC: 25 MMOL/L (ref 20–32)
CREAT SERPL-MCNC: 0.59 MG/DL (ref 0.66–1.25)
DIFFERENTIAL METHOD BLD: ABNORMAL
EOSINOPHIL # BLD AUTO: 0 10E9/L (ref 0–0.7)
EOSINOPHIL NFR BLD AUTO: 0 %
ERYTHROCYTE [DISTWIDTH] IN BLOOD BY AUTOMATED COUNT: 14.7 % (ref 10–15)
GFR SERPL CREATININE-BSD FRML MDRD: >90 ML/MIN/{1.73_M2}
GLUCOSE SERPL-MCNC: 120 MG/DL (ref 70–99)
HCT VFR BLD AUTO: 26.3 % (ref 40–53)
HGB BLD-MCNC: 8.6 G/DL (ref 13.3–17.7)
IMM PLASMA CELLS NFR BLD: 0.9 %
INTERPRETATION ECG - MUSE: NORMAL
LYMPHOCYTES # BLD AUTO: 0.9 10E9/L (ref 0.8–5.3)
LYMPHOCYTES NFR BLD AUTO: 67.8 %
MAGNESIUM SERPL-MCNC: 2 MG/DL (ref 1.6–2.3)
MCH RBC QN AUTO: 27.5 PG (ref 26.5–33)
MCHC RBC AUTO-ENTMCNC: 32.7 G/DL (ref 31.5–36.5)
MCV RBC AUTO: 84 FL (ref 78–100)
MONOCYTES # BLD AUTO: 0 10E9/L (ref 0–1.3)
MONOCYTES NFR BLD AUTO: 2.6 %
NEUTROPHILS # BLD AUTO: 0 10E9/L (ref 1.6–8.3)
NEUTROPHILS NFR BLD AUTO: 0.9 %
PHOSPHATE SERPL-MCNC: 3.8 MG/DL (ref 2.5–4.5)
PLASMA CELLS # BLD MANUAL: 0 10E9/L
PLATELET # BLD AUTO: 50 10E9/L (ref 150–450)
PLATELET # BLD EST: ABNORMAL 10*3/UL
POIKILOCYTOSIS BLD QL SMEAR: ABNORMAL
POTASSIUM SERPL-SCNC: 4.1 MMOL/L (ref 3.4–5.3)
PROT SERPL-MCNC: 7.1 G/DL (ref 6.8–8.8)
RBC # BLD AUTO: 3.13 10E12/L (ref 4.4–5.9)
SODIUM SERPL-SCNC: 138 MMOL/L (ref 133–144)
URATE SERPL-MCNC: 1.7 MG/DL (ref 3.5–7.2)
WBC # BLD AUTO: 1.4 10E9/L (ref 4–11)

## 2020-07-21 PROCEDURE — 25000128 H RX IP 250 OP 636: Mod: ZF

## 2020-07-21 PROCEDURE — 25800030 ZZH RX IP 258 OP 636: Mod: ZF | Performed by: PEDIATRICS

## 2020-07-21 PROCEDURE — 96413 CHEMO IV INFUSION 1 HR: CPT

## 2020-07-21 PROCEDURE — 96417 CHEMO IV INFUS EACH ADDL SEQ: CPT

## 2020-07-21 PROCEDURE — 96361 HYDRATE IV INFUSION ADD-ON: CPT

## 2020-07-21 PROCEDURE — 97803 MED NUTRITION INDIV SUBSEQ: CPT | Mod: ZF | Performed by: DIETITIAN, REGISTERED

## 2020-07-21 PROCEDURE — 25000128 H RX IP 250 OP 636: Mod: ZF | Performed by: PEDIATRICS

## 2020-07-21 PROCEDURE — 25800030 ZZH RX IP 258 OP 636: Mod: ZF

## 2020-07-21 PROCEDURE — 25000132 ZZH RX MED GY IP 250 OP 250 PS 637: Mod: ZF | Performed by: NURSE PRACTITIONER

## 2020-07-21 PROCEDURE — 83735 ASSAY OF MAGNESIUM: CPT | Performed by: NURSE PRACTITIONER

## 2020-07-21 PROCEDURE — G0463 HOSPITAL OUTPT CLINIC VISIT: HCPCS | Mod: 25

## 2020-07-21 PROCEDURE — 80053 COMPREHEN METABOLIC PANEL: CPT | Performed by: NURSE PRACTITIONER

## 2020-07-21 PROCEDURE — 85025 COMPLETE CBC W/AUTO DIFF WBC: CPT | Performed by: NURSE PRACTITIONER

## 2020-07-21 PROCEDURE — 84550 ASSAY OF BLOOD/URIC ACID: CPT | Performed by: NURSE PRACTITIONER

## 2020-07-21 PROCEDURE — 84100 ASSAY OF PHOSPHORUS: CPT | Performed by: NURSE PRACTITIONER

## 2020-07-21 RX ORDER — MICAFUNGIN 10 MG/ML
50 INJECTION, POWDER, LYOPHILIZED, FOR SOLUTION INTRAVENOUS DAILY
Status: ON HOLD | COMMUNITY
Start: 2020-07-21 | End: 2020-08-14

## 2020-07-21 RX ORDER — SODIUM CHLORIDE 9 MG/ML
INJECTION, SOLUTION INTRAVENOUS CONTINUOUS
Status: ACTIVE | OUTPATIENT
Start: 2020-07-21 | End: 2020-07-21

## 2020-07-21 RX ORDER — SODIUM CHLORIDE 9 MG/ML
INJECTION, SOLUTION INTRAVENOUS
Status: COMPLETED
Start: 2020-07-21 | End: 2020-07-21

## 2020-07-21 RX ORDER — HEPARIN SODIUM,PORCINE 10 UNIT/ML
2-4 VIAL (ML) INTRAVENOUS EVERY 24 HOURS
Status: DISCONTINUED | OUTPATIENT
Start: 2020-07-21 | End: 2020-07-21 | Stop reason: HOSPADM

## 2020-07-21 RX ORDER — GRANISETRON HYDROCHLORIDE 1 MG/1
1 TABLET, FILM COATED ORAL ONCE
Status: CANCELLED
Start: 2020-07-21

## 2020-07-21 RX ORDER — GRANISETRON HYDROCHLORIDE 1 MG/1
1 TABLET, FILM COATED ORAL ONCE
Status: COMPLETED | OUTPATIENT
Start: 2020-07-21 | End: 2020-07-21

## 2020-07-21 RX ORDER — HEPARIN SODIUM,PORCINE 10 UNIT/ML
VIAL (ML) INTRAVENOUS
Status: COMPLETED
Start: 2020-07-21 | End: 2020-07-21

## 2020-07-21 RX ADMIN — CYCLOPHOSPHAMIDE 845 MG: 1 INJECTION, POWDER, FOR SOLUTION INTRAVENOUS; ORAL at 12:00

## 2020-07-21 RX ADMIN — SODIUM CHLORIDE: 9 INJECTION, SOLUTION INTRAVENOUS at 08:45

## 2020-07-21 RX ADMIN — Medication 5 ML: at 15:04

## 2020-07-21 RX ADMIN — FLUDARABINE PHOSPHATE 50 MG: 25 INJECTION, SOLUTION INTRAVENOUS at 10:49

## 2020-07-21 RX ADMIN — GRANISETRON HYDROCHLORIDE 1 MG: 1 TABLET ORAL at 09:54

## 2020-07-21 RX ADMIN — HEPARIN, PORCINE (PF) 10 UNIT/ML INTRAVENOUS SYRINGE 5 ML: at 15:04

## 2020-07-21 RX ADMIN — SODIUM CHLORIDE 50 ML: 9 INJECTION, SOLUTION INTRAVENOUS at 10:53

## 2020-07-21 ASSESSMENT — PAIN SCALES - GENERAL: PAINLEVEL: NO PAIN (0)

## 2020-07-21 ASSESSMENT — MIFFLIN-ST. JEOR: SCORE: 1589.87

## 2020-07-21 NOTE — PHARMACY-CONSULT NOTE
Outpatient IV Medication Monitoring:     Artemio will continue on the following IV medications:     1.  Micafungin 50 mg Q24H  2. TPN (see formula below)     Artemio's labs and nutritional status were reviewed for today.  Today's changes: increase dextrose      The following reflects the new TPN formula.  Dosing Weight: 58.4 kg  Volume: 1200 mL, cycled over 12 hours  Protein: 1.5 g/kg  Dextrose: 150 g  (Increased from 85 grams)  Lipids: 250 mL     Sodium: 45 mEq/day  Potassium: 0 mEq/day  Calcium: 5 mEq/day  Magnesium 5 mEq/day  Phosphorus: 5 mMol/day  Chloride:Acetate ratio: 1:1  Trace elements with Selenium: std  Multivitamins: 10 mL     This was discussed with Lilia López NP and communicated to [facility].    Artemio will return to clinic Wednesday 7/22/20  for labs and reassessment.     Pharmacy will continue to follow.  Alexa Ramirez RPH

## 2020-07-21 NOTE — PROGRESS NOTES
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 He was last seen for follow up in [facility] on 6/5/2020 at which time, unfortunately, his lab work confirmed relapse of his leukemia. 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 Artemio had a reassessment bone marrow evaluation and LP completed on 7/14 at [facility]. BM morphology was hypocellular with evidence of residual ALL and MRD was positive at 78% on flow cytometry. His CSF was reported to be negative for leukemic blasts. Testing of the hepatic, renal and cardiac function did not identify dysfunction that would be of concern in regards to eligibility, nor alter our plan for transplantation.  We continue to follow his labs for hepatic transaminitis (, AST 53 as of today). Renal evaluation with a GFR study is normal and renal function on biochemistry testing was unremarkable. He has a prior diagnosis of WPW syndrome, follow up echocardiogram done as part of workup showed normal LVEF of 62%. Screening was performed for the routine infectious concerns, including CMV, hepatitis B/C, HIV, West Nile and T cruzi. This testing documented IgG positives for CMV and EBV. CT scans of the chest and paranasal sinuses (7/15/2020) were only remarkable for a tiny unchanged pulmonary nodule (3 mm) stable since 2018, this is not concerning.     He has been received bridging chemotherapy with oral 6MP, methotrexate and ruxolitinib.  He stopped all oral chemotherapy on 7/12. His non-myeloablative chemotherapy prep commences today (7/21-7/24) with day 1 Fludarabine and Cyclophosphamide with associated hyperhydration in the outpatient infusion clinic. He is here today with his step mother and his biological mother was driving therefore unable to join by phone today. He will complete prep as  an outpatient [facility] with plan to admit on 7/26  and receive CAR-T infusion (7/27) as inpatient.     Since his relapse he has had two re-admissions. Early July he developed a fever of 102F with sore throat, headache, emesis x 1 and lightheadedness. No respiratory symptoms. [facility]emio and then discharged. He said Covid and other tests were negative. He was told it was likely a virus. He remained afebrile until 7/17 at this time he presented with febrile neutropenia in the [facility] he was admitted to unit 4, blood cultures to date are no growth and we continue to follow them. He presented with both throat and oral pain from mouth sores that improved with oxycodone while inpatient. He continues to use oxycodone twice daily, but reports he feels like his mouth sores are improving. Initiated TPN while inpatient  and generally clinically well since dischage.  He previously complained of acid reflux now  Occasional nausea without emesis, using  benadryl prn. He now requires platelet premedications with tylenol and benadryl he also reported to sometimes need a second dose of benadryl secondary to hives. He has no known history of tongue swelling or respiratory compromise. His last platelet transfusion was on 7/20 and his last RBC transfusion was approximately two weeks ago.    Today, Artemio reports eating his normal amount (he did not quantify) and states he is trying to improve fluid intake. He reports sleeping well at night. He denies pain with urination, diarrhea and denies constipation. We continue to monitor for constipation with ongoing use of oxycodone. His is not experiencing any headaches,  lightheadness or shortness of breath with exertion his Hgb was 8.6 today. His platelets are 50,000 he states his gums bleed intermittently but not excessively. He has no concerns for rashes. He has no recent sick contacts     Review of Systems: Pertinent positives  include those mentioned in interval events. A complete review of systems was performed and is otherwise negative.    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    Social History: His mother (Mervat) is going to be relocating temporarily from [place] to be his full-time caregiver. Lodging through [facility]. His father (Dangelo), stepmother (Camryn) and paternal grandparents are all involved in his care. Isael has authorized communication with all of them.    Medications:  Please see MAR    Allergies   Allergen Reactions     Blood Transfusion Related (Informational Only) Other (See Comments)     Patient has a history of a clinically significant antibody against RBC antigens.  A delay in compatible RBCs may occur.Stem cell transplant patient.  Give type O RBCs.  Requires Benadryl as premed for platelets for history of hives     Voriconazole Photosensitivity     Significant rash - do not rechallenge       Physical Exam:  Temp:  [98.8  F (37.1  C)] 98.8  F (37.1  C)  Pulse:  [66-93] 75  Resp:  [16-18] 16  BP: ()/(57-75) 97/59  SpO2:  [99 %] 99 %    GEN: Lying in infusion bed. Pleasant and cooperative. Step mom present  HEENT Head NC/AT, sclerae clear, nares patent, OP with small scattered lesions, MMM  NECK: Supple. No cervical lymphadenopathy  CARD: Regular rate and rhythm. Normal S1 and S2, no murmurs, rubs or gallops. Cap refill < 2 sec  RESP: Lungs Clear to auscultation bilaterally. No increased work of breathing. No adventitious lung sounds.  ABD: Non-distended, non-tender, no organomegaly. Normal bowel sounds  EXTREM: No edema noted  SKIN: No rashes, bruising or petechiae. Port upper right chest, accessed.    Labs:  Results for orders placed or performed in visit on 07/21/20    CBC with platelets differential     Status: Abnormal   Result Value Ref Range    WBC 1.4 (L) 4.0 - 11.0 10e9/L    RBC Count 3.13 (L) 4.4 - 5.9 10e12/L    Hemoglobin 8.6 (L) 13.3 - 17.7 g/dL    Hematocrit 26.3 (L) 40.0 - 53.0 %    MCV 84 78 - 100 fl    MCH 27.5 26.5 - 33.0 pg    MCHC 32.7 31.5 - 36.5 g/dL    RDW 14.7 10.0 - 15.0 %    Platelet Count 50 (L) 150 - 450 10e9/L    Diff Method Manual Differential     % Neutrophils 0.9 %    % Lymphocytes 67.8 %    % Monocytes 2.6 %    % Eosinophils 0.0 %    % Basophils 0.0 %    % Blasts 27.8 %    % Plasma Cells 0.9 %    Absolute Neutrophil 0.0 (LL) 1.6 - 8.3 10e9/L    Absolute Lymphocytes 0.9 0.8 - 5.3 10e9/L    Absolute Monocytes 0.0 0.0 - 1.3 10e9/L    Absolute Eosinophils 0.0 0.0 - 0.7 10e9/L    Absolute Basophils 0.0 0.0 - 0.2 10e9/L    Absolute Blasts 0.4 (H) 0 10e9/L    Absolute Plasma Cells 0.0 0 10e9/L    Poikilocytosis Moderate     Platelet Estimate Confirming automated cell count    Comprehensive metabolic panel     Status: Abnormal   Result Value Ref Range    Sodium 138 133 - 144 mmol/L    Potassium 4.1 3.4 - 5.3 mmol/L    Chloride 109 94 - 109 mmol/L    Carbon Dioxide 25 20 - 32 mmol/L    Anion Gap 4 3 - 14 mmol/L    Glucose 120 (H) 70 - 99 mg/dL    Urea Nitrogen 20 7 - 30 mg/dL    Creatinine 0.59 (L) 0.66 - 1.25 mg/dL    GFR Estimate >90 >60 mL/min/[1.73_m2]    GFR Estimate If Black >90 >60 mL/min/[1.73_m2]    Calcium 9.1 8.5 - 10.1 mg/dL    Bilirubin Total 0.5 0.2 - 1.3 mg/dL    Albumin 3.4 3.4 - 5.0 g/dL    Protein Total 7.1 6.8 - 8.8 g/dL    Alkaline Phosphatase 92 40 - 150 U/L     (H) 0 - 70 U/L    AST 53 (H) 0 - 45 U/L   Magnesium     Status: None   Result Value Ref Range    Magnesium 2.0 1.6 - 2.3 mg/dL   Phosphorus     Status: None   Result Value Ref Range    Phosphorus 3.8 2.5 - 4.5 mg/dL   Uric acid     Status: Abnormal   Result Value Ref Range    Uric Acid 1.7 (L) 3.5 - 7.2 mg/dL       Assessment/Plan:  Artemio Nino is a  20 year old male  with 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 CD19 positive (dim). He is set to begin CAR-T ([facility]) per UR2038-12 on July 21, 2020. Due to fever and neutropenia he was readmitted on 7/17 but remains culture negative to-date. He has now commenced his non myeloablative chemotherapy prior to CAR-T ([facility]). Secondary to disease burden he will take allopurinol with concern for tumor lysis.     ALL and BMT: High risk B cell ALL (CNS negative) + JAK2 activation.  - Related bone marrow transplant 11/2/18  - Relapsed 6/5/2020, 19 months status post matched related BMT per [id number] (cytoxan/TBI arm) with CD19 (dim) relapsed b-cell lymphoblastic leukemia.  - Completed apheresis collection in the [facility] (6/2020).   - BMBx/LP/ITC performed at [facility]  (7/14) 78% blasts in his marrow with immunophenotypical features of the blasts similar to initial diagnosis specimen   - He has been receiving bridging chemotherapy with oral 6MP, methotrexate and ruxolitinib.    - All oral chemotherapy stopped on 7/12.   - Single lumen port (placed 6/10). Double burns line placed 7/20  - Both non-myeloablative chemotherapy per [id number] with cytoxan and fludarabine (7/21-7/24) and CAR-T infusion (7/27) originally planned for outpatient.    GVHD:  No signs of GvHD to date.     FEN/Renal:  ml/min  # Risk for malnutrition:  - Monitor nutritional intake  - Age appropriate diet     # Risk for electrolyte abnormalities:  - Check daily electrolytes    # Risk for renal dysfunction/risk for ELIZ secondary to potential tumor lysis with preparatory chemotherapy.   - Daily monitoring of uric acid,  phos and potassium a   -7/15 workup UA: RBC 6, protein albumin 30. Asymptomatic  - Monitor I/O's and daily weights    # Risk for TA-TMA:  - monitor LDH qMonday  - monitor urine protein/creatinine ratios  qTuesday    # Risk for TLS:  - Allopurinol TID on 7/21    Pulmonary:  # Risk for pulmonary insufficiency: No current concerns. 7/15/20, normal chest CT. Sinus CT showed no evidence of acute sinusitis. Interval improved polypoid mucosal thickening of the maxillary sinus with complete resolution on left.  - History of pneumomediastinum: finding upon work up Chest CT (10/22/19), asymptomatic. Per radiology, likely benign, perhaps transient. Now Resolved.    Cardiovascular:  Aaron-Parkinson-White Syndrome: diagnosed upon syncope in 02/2018. Asymptomatic, no interventions to date. No current concerns.  - EKG 7/15 revealed WPW with normal sinus rhythm. Will reach out to cardiology for their input  - Echocardiogram 7/15 showed LVEF of 62%    Heme:   # Pancytopenia secondary to chemotherapy:  - Transfuse for hemoglobin < 7, platelets < 10,000-   - - New history of significant hives with platelet transfusions x last 4 transfusions. Premedicate with tylenol and benadryl for platelets. May also need mid-med.  Last transfusion on 7/20. No premedications needed for RBC transfusions. Last RBC transfusion approximately two weeks ago.  - No GCSF    # Coags: Workup-  INR 1.18, PTT 35 (7/15)    Infectious Disease:  # Febrile Neutropenia: febrile to 100.8 7/17- discharged on 7/19   -Follow blood cultures until final     # Risk for infection given immunocompromised status: BMT ID labs pending   Active:   Prophylaxis:        - viral prophylaxis: HD Valtrex starting 7/21 to continue through Day +30  - fungal prophylaxis: Micafungin IV starting 7/21 and continue through Day +30. History of photoxic drug eruption with voriconazole, avoid use.   - bacterial prophylaxis: Levofloxacin 500 mg daily and continue until ANC >1000  - PCP prophylaxis: Bactrim to begin day +28    GI:   # Nausea:  - Scheduled medications: Kytril BID starting 7/21- will receive am dose in clinic   - PRN medications: Benadryl PRN    # Acid reflux:  protonix once daily.  Continue TUMS prn    # Transaminitis:  , AST 53    Neuro:  # Mucositis/pain:   - Oxycodone 5 mg PO every 6 hours PRN - reports using BID    Derm:  Dermatology consultation 7/24. No current rash. He is no longer using creams.     Fertility:  Sperm collected pre chemotherapy for fertility preservation      Lilia Prabhakar MSN, CPNP-AC  Pediatric Blood and Marrow Transplant Program  [facility] [phone]  Pager [phone]    Disposition: Return to clinic on 7/22/20 for Day 2 of Cytoxan and Fludarabine provider visit with Gabriella Paul     Patient Active Problem List   Diagnosis     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)     Aaron-Parkinson-White (WPW) syndrome     Bone marrow transplant candidate     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)     At risk for infection     S/P allogeneic bone marrow transplant (H)     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)     Fever

## 2020-07-21 NOTE — PROGRESS NOTES
Infusion Nursing Note    Artemio Nino Presents to [facility] today for: Fludarabine/Cytoxan/IVF    Due to :    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)  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)    Intravenous Access/Labs: Labs drawn from CVC placed yesterday.  Dressing saturated upon arrival.  Dressing change completed.    Infusion Note: Pt. States feeling well today with exception of soreness around new line.  Denies fevers/nausea.  Pt. Seen and assessed by Lilia López NP.  Verified labs with Lilia prior to releasing chemotherapies.  Hbg 8.6, Plt 50, ANC 0.0, ALT: 258, AST: 53.  Blood product therapy plan parameters changed and none needed today.  NS flush given 2 hours prior to starting chemotherapies.  Oral Kytril given.  Fludarabine given over 1 hour with blood return noted pre/post.  Cytoxan given over 1 hour with blood return pre/post.  2 hour post flush given when chemotherapies completed.  VSS throughout. Line heplocked at end of flush.    Discharge Plan:   Patient verbalized understanding of discharge instructions.  RN reviewed that pt should return to clinic tomorrow.  Pt left [facility] in stable condition without any further questions or concerns.

## 2020-07-21 NOTE — PATIENT INSTRUCTIONS
Has appointments set up at this time      Appointments already scheduled as of 7/22 at 811am MILLER

## 2020-07-21 NOTE — PROGRESS NOTES
This is a recent snapshot of the patient's [facility] medical record.  For current drug dose and complete information and questions, call [phone] or In Basket pool, [facility] home infusion ([id number])  CSN Number:  [id number]

## 2020-07-22 ENCOUNTER — ONCOLOGY VISIT (OUTPATIENT)
Dept: TRANSPLANT | Facility: CLINIC | Age: 20
End: 2020-07-22
Attending: NURSE PRACTITIONER
Payer: COMMERCIAL

## 2020-07-22 ENCOUNTER — INFUSION THERAPY VISIT (OUTPATIENT)
Dept: INFUSION THERAPY | Facility: CLINIC | Age: 20
End: 2020-07-22
Attending: PEDIATRICS
Payer: COMMERCIAL

## 2020-07-22 ENCOUNTER — HOME INFUSION (PRE-WILLOW HOME INFUSION) (OUTPATIENT)
Dept: PHARMACY | Facility: CLINIC | Age: 20
End: 2020-07-22

## 2020-07-22 VITALS
HEART RATE: 83 BPM | DIASTOLIC BLOOD PRESSURE: 71 MMHG | BODY MASS INDEX: 19.43 KG/M2 | TEMPERATURE: 98.7 F | OXYGEN SATURATION: 100 % | RESPIRATION RATE: 20 BRPM | SYSTOLIC BLOOD PRESSURE: 106 MMHG | WEIGHT: 131.9 LBS

## 2020-07-22 DIAGNOSIS — C91.02 ACUTE LYMPHOBLASTIC LEUKEMIA (ALL) IN RELAPSE (H): Primary | ICD-10-CM

## 2020-07-22 DIAGNOSIS — C91.01 ACUTE LYMPHOBLASTIC LEUKEMIA (ALL) IN REMISSION (H): ICD-10-CM

## 2020-07-22 LAB
ALBUMIN SERPL-MCNC: 3.6 G/DL (ref 3.4–5)
ALP SERPL-CCNC: 91 U/L (ref 40–150)
ALT SERPL W P-5'-P-CCNC: 225 U/L (ref 0–70)
ANION GAP SERPL CALCULATED.3IONS-SCNC: 3 MMOL/L (ref 3–14)
ANISOCYTOSIS BLD QL SMEAR: SLIGHT
AST SERPL W P-5'-P-CCNC: 44 U/L (ref 0–45)
BASOPHILS # BLD AUTO: 0 10E9/L (ref 0–0.2)
BASOPHILS NFR BLD AUTO: 0.9 %
BILIRUB SERPL-MCNC: 0.5 MG/DL (ref 0.2–1.3)
BLASTS # BLD: 0.2 10E9/L
BLASTS BLD QL SMEAR: 16.7 %
BUN SERPL-MCNC: 20 MG/DL (ref 7–30)
BURR CELLS BLD QL SMEAR: SLIGHT
CALCIUM SERPL-MCNC: 8.7 MG/DL (ref 8.5–10.1)
CHLORIDE SERPL-SCNC: 111 MMOL/L (ref 94–109)
CO2 SERPL-SCNC: 25 MMOL/L (ref 20–32)
CREAT SERPL-MCNC: 0.61 MG/DL (ref 0.66–1.25)
DIFFERENTIAL METHOD BLD: ABNORMAL
EOSINOPHIL # BLD AUTO: 0 10E9/L (ref 0–0.7)
EOSINOPHIL NFR BLD AUTO: 0.9 %
ERYTHROCYTE [DISTWIDTH] IN BLOOD BY AUTOMATED COUNT: 14.8 % (ref 10–15)
GFR SERPL CREATININE-BSD FRML MDRD: >90 ML/MIN/{1.73_M2}
GLUCOSE SERPL-MCNC: 115 MG/DL (ref 70–99)
HCT VFR BLD AUTO: 25.6 % (ref 40–53)
HGB BLD-MCNC: 8.4 G/DL (ref 13.3–17.7)
LYMPHOCYTES # BLD AUTO: 0.9 10E9/L (ref 0.8–5.3)
LYMPHOCYTES NFR BLD AUTO: 78.9 %
MAGNESIUM SERPL-MCNC: 1.9 MG/DL (ref 1.6–2.3)
MCH RBC QN AUTO: 28 PG (ref 26.5–33)
MCHC RBC AUTO-ENTMCNC: 32.8 G/DL (ref 31.5–36.5)
MCV RBC AUTO: 85 FL (ref 78–100)
MICROCYTES BLD QL SMEAR: PRESENT
MONOCYTES # BLD AUTO: 0 10E9/L (ref 0–1.3)
MONOCYTES NFR BLD AUTO: 2.6 %
NEUTROPHILS # BLD AUTO: 0 10E9/L (ref 1.6–8.3)
NEUTROPHILS NFR BLD AUTO: 0 %
PHOSPHATE SERPL-MCNC: 4 MG/DL (ref 2.5–4.5)
PLATELET # BLD AUTO: 40 10E9/L (ref 150–450)
PLATELET # BLD EST: ABNORMAL 10*3/UL
POIKILOCYTOSIS BLD QL SMEAR: ABNORMAL
POTASSIUM SERPL-SCNC: 4 MMOL/L (ref 3.4–5.3)
PROT SERPL-MCNC: 7.1 G/DL (ref 6.8–8.8)
RBC # BLD AUTO: 3 10E12/L (ref 4.4–5.9)
SODIUM SERPL-SCNC: 139 MMOL/L (ref 133–144)
URATE SERPL-MCNC: 1.6 MG/DL (ref 3.5–7.2)
WBC # BLD AUTO: 1.1 10E9/L (ref 4–11)

## 2020-07-22 PROCEDURE — 25000128 H RX IP 250 OP 636: Mod: JW,ZF | Performed by: PEDIATRICS

## 2020-07-22 PROCEDURE — 84550 ASSAY OF BLOOD/URIC ACID: CPT | Performed by: NURSE PRACTITIONER

## 2020-07-22 PROCEDURE — 96361 HYDRATE IV INFUSION ADD-ON: CPT

## 2020-07-22 PROCEDURE — 25800030 ZZH RX IP 258 OP 636: Mod: ZF | Performed by: NURSE PRACTITIONER

## 2020-07-22 PROCEDURE — 84100 ASSAY OF PHOSPHORUS: CPT | Performed by: NURSE PRACTITIONER

## 2020-07-22 PROCEDURE — 80053 COMPREHEN METABOLIC PANEL: CPT | Performed by: NURSE PRACTITIONER

## 2020-07-22 PROCEDURE — 25800030 ZZH RX IP 258 OP 636: Mod: ZF | Performed by: PEDIATRICS

## 2020-07-22 PROCEDURE — 96413 CHEMO IV INFUSION 1 HR: CPT

## 2020-07-22 PROCEDURE — 83735 ASSAY OF MAGNESIUM: CPT | Performed by: NURSE PRACTITIONER

## 2020-07-22 PROCEDURE — 25000132 ZZH RX MED GY IP 250 OP 250 PS 637: Mod: ZF | Performed by: NURSE PRACTITIONER

## 2020-07-22 PROCEDURE — 96417 CHEMO IV INFUS EACH ADDL SEQ: CPT

## 2020-07-22 PROCEDURE — 85025 COMPLETE CBC W/AUTO DIFF WBC: CPT | Performed by: NURSE PRACTITIONER

## 2020-07-22 RX ORDER — HEPARIN SODIUM,PORCINE 10 UNIT/ML
VIAL (ML) INTRAVENOUS
Status: DISPENSED
Start: 2020-07-22 | End: 2020-07-22

## 2020-07-22 RX ORDER — SODIUM CHLORIDE 9 MG/ML
INJECTION, SOLUTION INTRAVENOUS CONTINUOUS
Status: ACTIVE | OUTPATIENT
Start: 2020-07-22 | End: 2020-07-22

## 2020-07-22 RX ORDER — GRANISETRON HYDROCHLORIDE 1 MG/1
1 TABLET, FILM COATED ORAL ONCE
Status: COMPLETED | OUTPATIENT
Start: 2020-07-22 | End: 2020-07-22

## 2020-07-22 RX ORDER — GRANISETRON HYDROCHLORIDE 1 MG/1
1 TABLET, FILM COATED ORAL ONCE
Status: CANCELLED
Start: 2020-07-22

## 2020-07-22 RX ADMIN — CYCLOPHOSPHAMIDE 845 MG: 1 INJECTION, POWDER, FOR SOLUTION INTRAVENOUS; ORAL at 11:31

## 2020-07-22 RX ADMIN — GRANISETRON HYDROCHLORIDE 1 MG: 1 TABLET ORAL at 10:08

## 2020-07-22 RX ADMIN — FLUDARABINE PHOSPHATE 50 MG: 25 INJECTION, SOLUTION INTRAVENOUS at 10:29

## 2020-07-22 RX ADMIN — SODIUM CHLORIDE: 9 INJECTION, SOLUTION INTRAVENOUS at 08:39

## 2020-07-22 NOTE — PHARMACY-CONSULT NOTE
Outpatient IV Medication Monitoring:     Artemio will continue on the following IV medications:     1.  Micafungin 50 mg Q24H - okay to send supply through 7/26/20.  Plan to admit 7/26/20 afternoon.   2. TPN (see formula below)     Artemio's labs and nutritional status were reviewed for today.  Today's changes: change acid/base balance      The following reflects the new TPN formula.  Dosing Weight: 58.4 kg  Volume: 1200 mL, cycled over 12 hours  Protein: 1.5 g/kg  Dextrose: 150 g    Lipids: 250 mL     Sodium: 45 mEq/day  Potassium: 0 mEq/day  Calcium: 5 mEq/day  Magnesium 5 mEq/day  Phosphorus: 5 mMol/day  Chloride:Acetate ratio: 1:2  Trace elements with Selenium: std  Multivitamins: 10 mL     This was discussed with Gabriella Paul NP and communicated to [facility].    Artemio will return to clinic Thursday 7/23/20  for labs and reassessment.     Pharmacy will continue to follow.  Alexa Ramirez RPH

## 2020-07-22 NOTE — PROGRESS NOTES
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 He had bridging therapy with oral 6MP, methotrexate, and ruxolitinib, which was stopped 7/12. He has had two admissions since his relapse for fever, one in early July at [facility] and most recently 7/17 to 7/20 at [facility]. Blood cultures remain NGTD from his last admission and we continue to follow. He presents to [facility] today with his stepmother for Day 2 of his non-myleoablative chemotherapy prep for Kymriah with Fludarabine and Cyclophosphamide.      Artemio was last seen in [facility] yesterday. He reports that overnight around 12:45 am he woke with abdominal pain/cramping that lasted for about 45 minutes to one hour and resolved following a bout of diarrhea. He has not had any further abdominal pain or stool output since this incident. Isael otherwise feels that he is doing well and has had no fevers. His throat pain that he reported last week has significantly improved and he reports his only discomfort is related to some soreness at his CVC insertion site and minor gum pain. He has been taking his oxycodone intermittently for pain about 1-3 times per day. He has been eating well and ate 3 full meals yesterday, and is working to improve fluid intake. Denies fever, rash, rhinorrhea, cough, shortness of breath, painful urination, bleeding, or any other concerns.   Review of Systems: Pertinent positives include those mentioned in interval events. A complete review of systems was performed and is otherwise negative.    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    Social History: His mother (Mervat) temporarily relocated from [place] to be his full-time caregiver. Lodging through [employer]. His father (Dangelo), stepmother (Camryn) and paternal grandparents are all involved in his care. Isael has authorized communication with all of them.    Medications:  Please see MAR    Allergies   Allergen Reactions     Blood Transfusion Related (Informational Only) Other (See Comments)     Patient has a history of a clinically significant antibody against RBC antigens.  A delay in compatible RBCs may occur.Stem cell transplant patient.  Give type O RBCs.  Requires Benadryl as premed for platelets for history of hives     Voriconazole Photosensitivity     Significant rash - do not rechallenge       Physical Exam:  Vital Signs for Peds 7/22/2020   SYSTOLIC 106   DIASTOLIC 71   PULSE 83   TEMPERATURE 98.7   RESPIRATIONS 20   WEIGHT (kg) 59.8 kg   HEIGHT (cm)    BMI    pain    O2 100     GEN: Lying in infusion bed, tired but interactive and cooperative. NAD. Step mom present  HEENT Head NC/AT, sclerae clear, nares patent, OP with small scattered lesions, MMM  NECK: Supple. Small right sided cervical lymph node is palpable.   CARD: Regular rate and rhythm. Normal S1 and S2, no murmurs, rubs or gallops. Cap refill < 2 sec  RESP: Lungs CTAB. No increased work of breathing. No adventitious lung sounds.  ABD: Non-distended, non-tender to light and deep palpation, no organomegaly. Normoactive bowel sounds present.   EXTREM: No edema noted  SKIN: No rashes, bruising or petechiae.   ACCESS: DL burns line, SL port- not accessed.    Labs:  Results for orders placed or performed in visit on 07/22/20   CBC with platelets differential     Status: Abnormal   Result Value Ref Range    WBC 1.1 (L) 4.0 - 11.0 10e9/L    RBC Count 3.00 (L) 4.4 - 5.9 10e12/L    Hemoglobin 8.4 (L) 13.3 - 17.7 g/dL    Hematocrit 25.6 (L) 40.0 - 53.0 %    MCV 85 78 - 100 fl    MCH 28.0 26.5 - 33.0 pg    MCHC 32.8  31.5 - 36.5 g/dL    RDW 14.8 10.0 - 15.0 %    Platelet Count 40 (LL) 150 - 450 10e9/L    Diff Method Manual Differential     % Neutrophils 0.0 %    % Lymphocytes 78.9 %    % Monocytes 2.6 %    % Eosinophils 0.9 %    % Basophils 0.9 %    % Blasts 16.7 %    Absolute Neutrophil 0.0 (LL) 1.6 - 8.3 10e9/L    Absolute Lymphocytes 0.9 0.8 - 5.3 10e9/L    Absolute Monocytes 0.0 0.0 - 1.3 10e9/L    Absolute Eosinophils 0.0 0.0 - 0.7 10e9/L    Absolute Basophils 0.0 0.0 - 0.2 10e9/L    Absolute Blasts 0.2 (H) 0 10e9/L    Anisocytosis Slight     Poikilocytosis Moderate     Perth Cells Slight     Microcytes Present     Platelet Estimate Confirming automated cell count    Comprehensive metabolic panel     Status: Abnormal   Result Value Ref Range    Sodium 139 133 - 144 mmol/L    Potassium 4.0 3.4 - 5.3 mmol/L    Chloride 111 (H) 94 - 109 mmol/L    Carbon Dioxide 25 20 - 32 mmol/L    Anion Gap 3 3 - 14 mmol/L    Glucose 115 (H) 70 - 99 mg/dL    Urea Nitrogen 20 7 - 30 mg/dL    Creatinine 0.61 (L) 0.66 - 1.25 mg/dL    GFR Estimate >90 >60 mL/min/[1.73_m2]    GFR Estimate If Black >90 >60 mL/min/[1.73_m2]    Calcium 8.7 8.5 - 10.1 mg/dL    Bilirubin Total 0.5 0.2 - 1.3 mg/dL    Albumin 3.6 3.4 - 5.0 g/dL    Protein Total 7.1 6.8 - 8.8 g/dL    Alkaline Phosphatase 91 40 - 150 U/L     (H) 0 - 70 U/L    AST 44 0 - 45 U/L   Magnesium     Status: None   Result Value Ref Range    Magnesium 1.9 1.6 - 2.3 mg/dL   Phosphorus     Status: None   Result Value Ref Range    Phosphorus 4.0 2.5 - 4.5 mg/dL   Uric acid     Status: Abnormal   Result Value Ref Range    Uric Acid 1.6 (L) 3.5 - 7.2 mg/dL       Assessment/Plan:  Artemio Nino is a  20 year old male with 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 CD19 positive (dim). Recently admitted for fever 7/17 to 7/20 and remains culture negative to date. Now  undergoing his chemotherapy prep (7/21-7/24) per [id number] prior to admission on 7/26 for his Kymriah infusion on 7/27.     Artemio continues chemotherapy today with Fludarabine and Cyclophosphamide. He continues on TPN/IL for nutritional support. Will consider stool studies if diarrhea frequency increases.     ALL and BMT: High risk B cell ALL (CNS negative) + JAK2 activation.  - Related bone marrow transplant 11/2/18  - Relapsed 6/5/2020, 19 months status post matched related BMT per [id number] (cytoxan/TBI arm) with CD19 (dim) relapsed b-cell lymphoblastic leukemia.  - [facility]   - He has been receiving bridging chemotherapy with oral 6MP, methotrexate and ruxolitinib. All oral chemotherapy stopped on 7/12.   - BM this week showed 75% involvement with leukemia. CSF was negative.   - Lymphodepleting chemotherapy per [id number] with cytoxan and fludarabine planned for 7/21-7/24 and CAR-T infusion (7/27); the chemotherapy is planned for outpatient, with an admission prior to cell infusion 7/26.      FEN/Renal:   # Risk for malnutrition: Intake improving, poor fluid intake   - Monitor nutritional intake  - Age appropriate diet   -Continue TPN/IL      # Risk for electrolyte abnormalities:  - Check daily electrolytes     # Risk for renal dysfunction/risk for ELIZ secondary to potential tumor lysis with preparatory chemotherapy.: GFR 133ml/min  - Daily electrolyte monitoring  -Monitor I/Os and daily weights     # Risk for TLS: Uric Acid 1.6 7/22  - Continues Allopurinol TID     Pulmonary:  # Risk for pulmonary insufficiency: No current concerns. Normal chest and sinus CTs during work-up.      Cardiovascular:  Aaron-Parkinson-White Syndrome: diagnosed upon syncope in 02/2018. Asymptomatic, no interventions to date. No current concerns.  - EKG 7/15 revealed WPW with normal sinus rhythm.   - Echocardiogram 7/15 showed LVEF of 62%  - Cardiology to decide if they want to  assess him.    Heme:   # Pancytopenia secondary to chemotherapy:  - Transfuse for hemoglobin < 7, platelets < 10,000  - New history of significant hives with platelet transfusions x last 4 transfusions. Premedicate with tylenol, hydrocortisone, and benadryl for platelets.  - No premedications needed for RBC transfusions. Last RBC transfusion approximately two weeks ago.  - No GCSF per protocol     Infectious Disease:  # Febrile Neutropenia: Discharged 7/20.    - blood cultures NGTD     # Risk for infection given immunocompromised status:    Active: See above  Prophylaxis:                                                                      - Viral prophylaxis: HD Valtrex continue through Day +30  - Fungal prophylaxis: Micafungin continue through Day +30. History of photoxic drug eruption with voriconazole, avoid use.   - Bacterial prophylaxis: Levofloxacin 500 mg daily until ANC >1000  - PCP prophylaxis: Bactrim to begin day +28      GI:   # Nausea:  - Scheduled medications: Kytril BID starting 7/21  - PRN medications: Benadryl PRN     # Acid reflux:  Protonix once daily. Continue TUMS prn.     # Transaminitis:  Possibly related to recent chemotherapy. May warrant RUQ US to further assess persistent transamnitis.    -Improved 7/22- , AST 44.     #abdominal pain and diarrhea: One episode overnight 7/22  - Consider stool collection if frequency increases     Neuro:  # Mucositis/pain: Throat pain improving  - Oxycodone 5 mg PO every 6 hours PRN   - Tramadol prn     Oral Hygiene:   # Receding gums, risk for infection  - Continue chlorhexidine mouth wash for symptomatic control     Derm:  Dermatology consultation 7/24. No current rash. He is no longer using creams.       Fertility:  Sperm collected pre chemotherapy for fertility preservation    Disposition: Return to clinic on 7/23/20 for Day 3 Fludarabine for exam with POLLO    Gabriella YOUSIF-PC  [facility]  Pediatric Blood  and Marrow Transplant    Patient Active Problem List   Diagnosis     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)     Aaron-Parkinson-White (WPW) syndrome     Bone marrow transplant candidate     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)     At risk for infection     S/P allogeneic bone marrow transplant (H)     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)     Fever

## 2020-07-22 NOTE — PROGRESS NOTES
Infusion Nursing Note    Artemio Nino Presents to [facility] today for: Fludarabine/Cytoxan/IVF    Due to :    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)  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)    Intravenous Access/Labs: Labs drawn from CVC.    Infusion Note: Pt. States feeling well today with exception of soreness around new line.  Denies fevers/nausea.  Pt. Seen and assessed by Gabriella Chester NP.  Verified labs with Gabriella prior to releasing chemotherapies. Blood product therapy plan parameters changed and none needed today.  NS flush given 2 hours prior to starting chemotherapies.  Oral Kytril given.  Fludarabine given over 1 hour with blood return noted pre/post.  Cytoxan given over 1 hour with blood return pre/post.  2 hour post flush given when chemotherapies completed.  VSS throughout. Line heplocked at end of flush.    Discharge Plan:   Patient verbalized understanding of discharge instructions.  RN reviewed that pt should return to clinic tomorrow.  Pt left [facility] in stable condition without any further questions or concerns.

## 2020-07-22 NOTE — PATIENT INSTRUCTIONS
Return to [facility] for labs and exam with POLLO on 7/23.     Infusion needs: Day 3 of chemo for [facility]    Patient has PICC, Central line, CVC line, to be drawn off of per lab.     Medication changes: None    Care plan changes: None    Contact information  During business hours (7:30am-4:30pm):   To leave a non-urgent voicemail: call triage line [phone]    To call for time-sensitive needs or concerns : call clinic  [phone]    Evenings after 4:30pm, weekends, and holidays:   For any needs or concerns: call for BMT fellow at [phone] in the case of an emergency    Thank you!       Appointment already scheduled as of 7/23 at 843am MILLER

## 2020-07-22 NOTE — PROGRESS NOTES
This is a recent snapshot of the patient's [facility] medical record.  For current drug dose and complete information and questions, call [phone] or In Basket pool, fv home infusion ([id number])  CSN Number:  [id number]

## 2020-07-23 ENCOUNTER — HOME INFUSION (PRE-WILLOW HOME INFUSION) (OUTPATIENT)
Dept: PHARMACY | Facility: CLINIC | Age: 20
End: 2020-07-23

## 2020-07-23 ENCOUNTER — ONCOLOGY VISIT (OUTPATIENT)
Dept: TRANSPLANT | Facility: CLINIC | Age: 20
End: 2020-07-23
Attending: NURSE PRACTITIONER
Payer: COMMERCIAL

## 2020-07-23 ENCOUNTER — INFUSION THERAPY VISIT (OUTPATIENT)
Dept: INFUSION THERAPY | Facility: CLINIC | Age: 20
End: 2020-07-23
Attending: PEDIATRICS
Payer: COMMERCIAL

## 2020-07-23 VITALS
SYSTOLIC BLOOD PRESSURE: 102 MMHG | WEIGHT: 129.85 LBS | TEMPERATURE: 98.5 F | DIASTOLIC BLOOD PRESSURE: 60 MMHG | BODY MASS INDEX: 19.12 KG/M2 | HEART RATE: 81 BPM | OXYGEN SATURATION: 99 % | RESPIRATION RATE: 18 BRPM

## 2020-07-23 DIAGNOSIS — C91.02 ACUTE LYMPHOBLASTIC LEUKEMIA (ALL) IN RELAPSE (H): Primary | ICD-10-CM

## 2020-07-23 DIAGNOSIS — C91.01 ACUTE LYMPHOBLASTIC LEUKEMIA (ALL) IN REMISSION (H): Primary | ICD-10-CM

## 2020-07-23 DIAGNOSIS — C91.01 ACUTE LYMPHOBLASTIC LEUKEMIA (ALL) IN REMISSION (H): ICD-10-CM

## 2020-07-23 LAB
ALBUMIN SERPL-MCNC: 3.7 G/DL (ref 3.4–5)
ALP SERPL-CCNC: 91 U/L (ref 40–150)
ALT SERPL W P-5'-P-CCNC: 228 U/L (ref 0–70)
ANION GAP SERPL CALCULATED.3IONS-SCNC: 5 MMOL/L (ref 3–14)
AST SERPL W P-5'-P-CCNC: 63 U/L (ref 0–45)
BACTERIA SPEC CULT: NO GROWTH
BASOPHILS # BLD AUTO: 0 10E9/L (ref 0–0.2)
BASOPHILS NFR BLD AUTO: 0 %
BILIRUB SERPL-MCNC: 0.6 MG/DL (ref 0.2–1.3)
BLASTS # BLD: 0.1 10E9/L
BLASTS BLD QL SMEAR: 26 %
BUN SERPL-MCNC: 21 MG/DL (ref 7–30)
BURR CELLS BLD QL SMEAR: SLIGHT
CALCIUM SERPL-MCNC: 9.3 MG/DL (ref 8.5–10.1)
CHLORIDE SERPL-SCNC: 108 MMOL/L (ref 94–109)
CO2 SERPL-SCNC: 25 MMOL/L (ref 20–32)
CREAT SERPL-MCNC: 0.63 MG/DL (ref 0.66–1.25)
DIFFERENTIAL METHOD BLD: ABNORMAL
ELLIPTOCYTES BLD QL SMEAR: SLIGHT
EOSINOPHIL # BLD AUTO: 0 10E9/L (ref 0–0.7)
EOSINOPHIL NFR BLD AUTO: 0 %
ERYTHROCYTE [DISTWIDTH] IN BLOOD BY AUTOMATED COUNT: 14.6 % (ref 10–15)
GFR SERPL CREATININE-BSD FRML MDRD: >90 ML/MIN/{1.73_M2}
GLUCOSE SERPL-MCNC: 132 MG/DL (ref 70–99)
HCT VFR BLD AUTO: 25.6 % (ref 40–53)
HGB BLD-MCNC: 8.5 G/DL (ref 13.3–17.7)
LYMPHOCYTES # BLD AUTO: 0.4 10E9/L (ref 0.8–5.3)
LYMPHOCYTES NFR BLD AUTO: 71 %
Lab: NORMAL
MAGNESIUM SERPL-MCNC: 2 MG/DL (ref 1.6–2.3)
MCH RBC QN AUTO: 27.6 PG (ref 26.5–33)
MCHC RBC AUTO-ENTMCNC: 33.2 G/DL (ref 31.5–36.5)
MCV RBC AUTO: 83 FL (ref 78–100)
MONOCYTES # BLD AUTO: 0 10E9/L (ref 0–1.3)
MONOCYTES NFR BLD AUTO: 3 %
NEUTROPHILS # BLD AUTO: 0 10E9/L (ref 1.6–8.3)
NEUTROPHILS NFR BLD AUTO: 0 %
PHOSPHATE SERPL-MCNC: 4.4 MG/DL (ref 2.5–4.5)
PLATELET # BLD AUTO: 27 10E9/L (ref 150–450)
PLATELET # BLD EST: ABNORMAL 10*3/UL
POIKILOCYTOSIS BLD QL SMEAR: SLIGHT
POTASSIUM SERPL-SCNC: 4 MMOL/L (ref 3.4–5.3)
PROT SERPL-MCNC: 7.3 G/DL (ref 6.8–8.8)
RBC # BLD AUTO: 3.08 10E12/L (ref 4.4–5.9)
SODIUM SERPL-SCNC: 138 MMOL/L (ref 133–144)
SPECIMEN SOURCE: NORMAL
URATE SERPL-MCNC: 1.9 MG/DL (ref 3.5–7.2)
WBC # BLD AUTO: 0.5 10E9/L (ref 4–11)

## 2020-07-23 PROCEDURE — 96413 CHEMO IV INFUSION 1 HR: CPT

## 2020-07-23 PROCEDURE — 25800030 ZZH RX IP 258 OP 636: Mod: ZF | Performed by: PEDIATRICS

## 2020-07-23 PROCEDURE — 25000128 H RX IP 250 OP 636: Mod: ZF | Performed by: PEDIATRICS

## 2020-07-23 PROCEDURE — 25000128 H RX IP 250 OP 636: Mod: ZF

## 2020-07-23 PROCEDURE — 84100 ASSAY OF PHOSPHORUS: CPT | Performed by: NURSE PRACTITIONER

## 2020-07-23 PROCEDURE — 80053 COMPREHEN METABOLIC PANEL: CPT | Performed by: NURSE PRACTITIONER

## 2020-07-23 PROCEDURE — 85025 COMPLETE CBC W/AUTO DIFF WBC: CPT | Performed by: NURSE PRACTITIONER

## 2020-07-23 PROCEDURE — 25000132 ZZH RX MED GY IP 250 OP 250 PS 637: Mod: ZF | Performed by: PHYSICIAN ASSISTANT

## 2020-07-23 PROCEDURE — 83735 ASSAY OF MAGNESIUM: CPT | Performed by: NURSE PRACTITIONER

## 2020-07-23 PROCEDURE — 84550 ASSAY OF BLOOD/URIC ACID: CPT | Performed by: NURSE PRACTITIONER

## 2020-07-23 RX ORDER — HEPARIN SODIUM,PORCINE 10 UNIT/ML
2-4 VIAL (ML) INTRAVENOUS
Status: DISCONTINUED | OUTPATIENT
Start: 2020-07-23 | End: 2020-07-23 | Stop reason: HOSPADM

## 2020-07-23 RX ORDER — HEPARIN SODIUM,PORCINE 10 UNIT/ML
VIAL (ML) INTRAVENOUS
Status: COMPLETED
Start: 2020-07-23 | End: 2020-07-23

## 2020-07-23 RX ORDER — GRANISETRON HYDROCHLORIDE 1 MG/1
1 TABLET, FILM COATED ORAL ONCE
Status: COMPLETED | OUTPATIENT
Start: 2020-07-23 | End: 2020-07-23

## 2020-07-23 RX ORDER — GRANISETRON HYDROCHLORIDE 1 MG/1
1 TABLET, FILM COATED ORAL ONCE
Status: CANCELLED
Start: 2020-07-23

## 2020-07-23 RX ADMIN — FLUDARABINE PHOSPHATE 50 MG: 25 INJECTION, SOLUTION INTRAVENOUS at 09:09

## 2020-07-23 RX ADMIN — Medication 5 ML: at 10:18

## 2020-07-23 RX ADMIN — GRANISETRON HYDROCHLORIDE 1 MG: 1 TABLET ORAL at 08:46

## 2020-07-23 RX ADMIN — HEPARIN, PORCINE (PF) 10 UNIT/ML INTRAVENOUS SYRINGE 5 ML: at 10:18

## 2020-07-23 RX ADMIN — SODIUM CHLORIDE 50 ML: 9 INJECTION, SOLUTION INTRAVENOUS at 09:13

## 2020-07-23 NOTE — PHARMACY-CONSULT NOTE
Outpatient IV Medication Monitoring:     Artemio will continue on the following IV medications:     1.  Micafungin 50 mg Q24H - Please send supply through 7/26/20.  Plan to admit 7/26/20 afternoon.   2. TPN (see formula below) - Please send bags for 7/24/20 and 7/25/20. Pt will receive TPN inpatient on 7/26/20.      Artemio's labs and nutritional status were reviewed for today.  Today's changes: none.      The following reflects the new TPN formula.  Dosing Weight: 58.4 kg  Volume: 1200 mL, cycled over 12 hours  Protein: 1.5 g/kg  Dextrose: 150 g    Lipids: 250 mL     Sodium: 45 mEq/day  Potassium: 0 mEq/day  Calcium: 5 mEq/day  Magnesium 5 mEq/day  Phosphorus: 5 mMol/day  Chloride:Acetate ratio: 1:2  Trace elements with Selenium: std  Multivitamins: 10 mL     This was discussed with Viky Zavala MD and communicated to [facility].    Artemio will admit to [facility] on 7/26/20 to receive his Kymriah infusion.  Anticipate inpatient stay of at least 14 days.      Pharmacy will continue to follow.  Alexa Ramirez [facility]

## 2020-07-23 NOTE — PROGRESS NOTES
CLINICAL NUTRITION SERVICES - PEDIATRIC ASSESSMENT NOTE     REASON FOR ASSESSMENT  Artemio Nino is a 20 year old male seen by the dietitian for consult for home PN management.  Face time 15 minutes.      ANTHROPOMETRICS  Height/Length: 175.5 cm  Weight: 59.8 kg  BMI: 19.1 kg/m2- BMI indicates underweight status  Comments: Weight in October of 2018 was 65.1 kg- This is a 10% weight loss.     NUTRITION HISTORY  Patient is on a Regular diet at home and was started on PN last night.  Typical food/fluid intake is decreased due to throat pain with sores in throat and gum discomfort- Artemio states that he ate a little better yesterday and last night.  PN went well.  Information obtained from pt and step-mom  Factors affecting nutrition intake include: sores in throat and pain, medical course     CURRENT NUTRITION ORDERS  Diet:Regular    CURRENT NUTRITION SUPPORT   Parenteral Nutrition:  Type of Parenteral Access: Central  PN frequency: Cycled, 12 hour    PN of 1200 mLs, GIR of 2 mg/kg/min (85 g dextrose), 87.6 g protein, 1.5 g/kg protein and 250 mL lipids to provide 1140 kcals (20 kcal/kg).  PN is meeting 65% of kcal needs and 100% of protein needs.     NEW FINDINGS  Preparing for Car-T     LABS  Labs reviewed     MEDICATIONS  Medications reviewed     ASSESSED NUTRITION NEEDS:  Estimated Energy Needs: 30-35 kcal/kg  Estimated Protein Needs: 1.5 g/kg  Estimated Fluid Needs: 1 mL/kcal     NUTRITION DIAGNOSIS:  Inadequate oral intake related to throat sores and pain as evidence by decreased po and wt loss.     INTERVENTIONS  Nutrition Prescription  Po/nutrition support to meet needs for age appropriate growth    Nutrition Education:   Provided education to Artemio and his step-mom on home PN.  Discussed increasing dextrose to better meet needs.     Implementation:  Parenteral nutrition and Collaboration and Referral of Nutrition care- Pt discussed with providers.  Increasing dextrose to 150 g, GIR of 3.5 for 23 kcal/kg  and 77% of kcal needs.    Goals  1. Po and/or nutrition support to meet greater than 75% of needs  2. Weight maintenance     FOLLOW UP/MONITORING  Food and Beverage intake- monitor po, Enteral and parenteral nutrition intake- adjust as needed and Anthropometric measurements- monitor wt     RECOMMENDATIONS  If ongoing weight loss, increase PN to a GIR of 5 mg/kg/min.     Renetta Schwab, RD, LD, [facility]  [phone]

## 2020-07-23 NOTE — PROGRESS NOTES
This is a recent snapshot of the patient's [facility] medical record.  For current drug dose and complete information and questions, call [phone] or In Basket pool, fv home infusion ([id number])  CSN Number:  [id number]

## 2020-07-23 NOTE — PHARMACY-CONSULT NOTE
Outpatient IV Medication Monitoring:     Artemio will continue on the following IV medications:     1.  Micafungin 50 mg Q24H - okay to send supply through 7/26/20.  Plan to admit 7/26/20 afternoon.   2. TPN (see formula below)     Artemio's labs and nutritional status were reviewed for today.  Today's changes: none      The following reflects the new TPN formula.  Dosing Weight: 58.4 kg  Volume: 1200 mL, cycled over 12 hours  Protein: 1.5 g/kg  Dextrose: 150 g    Lipids: 250 mL     Sodium: 45 mEq/day  Potassium: 0 mEq/day  Calcium: 5 mEq/day  Magnesium 5 mEq/day  Phosphorus: 5 mMol/day  Chloride:Acetate ratio: 1:2  Trace elements with Selenium: std  Multivitamins: 10 mL     This was discussed with HANNAH Santos and communicated to [facility].    Artemio will return to clinic Friday 7/24/20  for labs and reassessment.     Pharmacy will continue to follow.  Alexa Ramirez RPH

## 2020-07-23 NOTE — PROGRESS NOTES
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 He had bridging therapy with oral 6MP, methotrexate, and ruxolitinib, which was stopped 7/12. He has had two admissions since his relapse for fever, one in early July at [facility] and most recently 7/17 to 7/20 at [facility]. Blood cultures were negative.     Isael is here today with his stepmother for day +3 of his non-myleoablative chemotherapy prep for Kymriah with Fludarabine and Cyclophosphamide. He remains afebrile and has no URI symptoms. He says that he struggles with his oral medications due to nausea and mucositis, particularly levaquin. He took at least one dose of all of his medications yesterday. Generalized nausea with declining appetite. He remains on nutritional support with TPN. No diarrhea or rash. He says his mucositis is mild and mainly effects his throat. He has oxycodone but prefers not to use it very often.     Review of Systems: Pertinent positives include those mentioned in interval events. A complete review of systems was performed and is otherwise negative.    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    Social History: His mother (Mervat) temporarily relocated from [place] to be his full-time caregiver. Lodging through [facility]. His father (Dangelo), stepmother (Camryn) and paternal grandparents are all involved in his care. Isael has authorized communication with all of them.    Medications:  Please see MAR       Allergies   Allergen Reactions     Blood Transfusion Related (Informational Only) Other (See Comments)      Patient has a history of a clinically significant antibody against RBC antigens.  A delay in compatible RBCs may occur.Stem cell transplant patient.  Give type O RBCs.  Requires Benadryl as premed for platelets for history of hives     Voriconazole Photosensitivity     Significant rash - do not rechallenge       Physical Exam:  Vital Signs for Peds 7/23/2020   SYSTOLIC 104   DIASTOLIC 72   PULSE 91   TEMPERATURE 98.5   RESPIRATIONS 18   WEIGHT (kg)    HEIGHT (cm)    BMI    pain    O2 99     GEN: Lying in infusion bed. Pleasant and cooperative. Stepmother present.  HEENT Head NC/AT, neck supple, sclerae clear, nares patent, OP with small scattered lesions, MMM  CARD: Regular rate and rhythm. Normal S1 and S2, no murmurs, rubs or gallops. Cap refill < 2 sec  RESP: Lungs CTAB. No increased work of breathing. No adventitious lung sounds.  ABD: Non-distended, non-tender to deep palpation, no organomegaly. Positive bowel sounds present.   EXTREM: No edema noted  SKIN: No rashes, bruising or petechiae.   ACCESS: DL burns line, SL port- not accessed.    Labs:  Results for orders placed or performed in visit on 07/23/20   CBC with platelets differential     Status: Abnormal (In process)   Result Value Ref Range    WBC PENDING 4.0 - 11.0 10e9/L    RBC Count 3.08 (L) 4.4 - 5.9 10e12/L    Hemoglobin 8.5 (L) 13.3 - 17.7 g/dL    Hematocrit 25.6 (L) 40.0 - 53.0 %    MCV 83 78 - 100 fl    MCH 27.6 26.5 - 33.0 pg    MCHC 33.2 31.5 - 36.5 g/dL    RDW 14.6 10.0 - 15.0 %    Platelet Count 27 (LL) 150 - 450 10e9/L    Diff Method PENDING    Comprehensive metabolic panel     Status: Abnormal   Result Value Ref Range    Sodium 138 133 - 144 mmol/L    Potassium 4.0 3.4 - 5.3 mmol/L    Chloride 108 94 - 109 mmol/L    Carbon Dioxide 25 20 - 32 mmol/L    Anion Gap 5 3 - 14 mmol/L    Glucose 132 (H) 70 - 99 mg/dL    Urea Nitrogen 21 7 - 30 mg/dL    Creatinine 0.63 (L) 0.66 - 1.25 mg/dL    GFR Estimate >90 >60 mL/min/[1.73_m2]    GFR Estimate  If Black >90 >60 mL/min/[1.73_m2]    Calcium 9.3 8.5 - 10.1 mg/dL    Bilirubin Total 0.6 0.2 - 1.3 mg/dL    Albumin 3.7 3.4 - 5.0 g/dL    Protein Total 7.3 6.8 - 8.8 g/dL    Alkaline Phosphatase 91 40 - 150 U/L     (H) 0 - 70 U/L    AST 63 (H) 0 - 45 U/L   Magnesium     Status: None   Result Value Ref Range    Magnesium 2.0 1.6 - 2.3 mg/dL   Phosphorus     Status: None   Result Value Ref Range    Phosphorus 4.4 2.5 - 4.5 mg/dL   Uric acid     Status: Abnormal   Result Value Ref Range    Uric Acid 1.9 (L) 3.5 - 7.2 mg/dL       Assessment/Plan:  Artemio Nino is a  20 year old male with 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 CD19 positive (dim). Recently admitted for fever 7/17 to 7/20 and remains culture negative to date. Now undergoing his chemotherapy prep (7/21-7/24) per MT2017-45 prior to admission on 7/26 for his Kymriah infusion on 7/27.     Day +3 of his non-myleoablative chemotherapy prep for Kymriah with Fludarabine and Cyclophosphamide well tolerated. Struggling with oral meds but taking at least one dose daily. He remains on TPN/IL for nutritional support.     ALL and BMT: High risk B cell ALL (CNS negative) + JAK2 activation.  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  - [facility] (6/2020).   - He has been receiving bridging chemotherapy with oral 6MP, methotrexate and ruxolitinib. All oral chemotherapy stopped on 7/12.   - BM this week showed 75% involvement with leukemia. CSF was negative.   - Outpatient lymphodepleting chemotherapy per MT2017-45 with cytoxan and fludarabine 7/21-7/24  - Hospital admission 7/26 with CAR-T infusion 7/27.     FEN/Renal:   # Risk for  malnutrition: Declining appetite per Isael.  - Continue nutritional support with TPN/IL  - Age appropriate diet   - Monitor nutritional intake    # Risk for electrolyte abnormalities:  - Check daily electrolytes     # Risk for renal dysfunction/risk for ELIZ secondary to potential tumor lysis with preparatory chemotherapy.: GFR 133ml/min  - Daily electrolyte monitoring  - Monitor daily weights     # Risk for TLS: Uric Acid 1.9 today.  - Continues Allopurinol TID but generally only taking one dose daily due to nausea/mucositis     Pulmonary:  # Risk for pulmonary insufficiency: No current concerns. Normal chest and sinus CTs during work-up.      Cardiovascular:  Aaron-Parkinson-White Syndrome: diagnosed upon syncope in 02/2018. Asymptomatic, no interventions to date. No current concerns.  - EKG 7/15 revealed WPW with normal sinus rhythm.   - Echocardiogram 7/15 showed LVEF of 62%  - Cardiology to decide if they want to assess him.    Heme:   # Pancytopenia secondary to chemotherapy:  - Transfuse for hemoglobin < 7, platelets < 10,000  - New history of significant hives with platelet transfusions x last 5 transfusions. Premedicate with tylenol, hydrocortisone, and benadryl for platelets. May need benadryl mid-med  - No premedications needed for RBC transfusions. Last RBC transfusion approximately two weeks ago.  - No GCSF per protocol     Infectious Disease:  # Febrile Neutropenia: Discharged 7/20.    - blood cultures NGTD     # Risk for infection given immunocompromised status:    Active: See above  Prophylaxis:                                                                      - Viral prophylaxis: HD Valtrex continue through Day +30  - Fungal prophylaxis: Micafungin continue through Day +30. History of photoxic drug eruption with voriconazole, avoid use.   - Bacterial prophylaxis: Levofloxacin 500 mg daily until ANC >1000  - PCP prophylaxis: Bactrim to begin day +28      GI:   # Nausea:  - Scheduled  medications: Kytril BID  - PRN medications: Benadryl PRN     # Acid reflux:  Protonix once daily. Continue TUMS prn.     # Transaminitis:  Possibly related to recent chemotherapy. May warrant RUQ US to further assess persistent transamnitis.    - Stable today. , AST 63    Improved 7/22- , AST 44.     #abdominal pain and diarrhea: One episode overnight 7/22  - Consider stool collection if frequency increases     Neuro:  # Mucositis/pain: Throat pain   - Oxycodone PRN available but he doesn't use often  - Tramadol prn     Oral Hygiene:   # Receding gums, risk for infection  - Continue chlorhexidine mouth wash for symptomatic control     Derm:  Dermatology consultation 7/24. No current rash. He is no longer using creams.       Fertility:  Sperm collected pre chemotherapy for fertility preservation    Disposition: Return to clinic on 7/23/20 for Day 3 Fludarabine for exam with POLLO        Patient Active Problem List   Diagnosis     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)     Aaron-Parkinson-White (WPW) syndrome     Bone marrow transplant candidate     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)     At risk for infection     S/P allogeneic bone marrow transplant (H)     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)     Fever

## 2020-07-23 NOTE — PROGRESS NOTES
Infusion Nursing Note    Artemio Nino Presents to [facility] today for:C1D3 fludarabine    Due to :    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)  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)    Intravenous Access/Labs: Labs drawn per red lumen of CVC.     Coping:   Child Family Life declined    Infusion Note: Parameters met for fludarabine. Oral kytril administered per orders. Fludarabine administered without issue. Blood return noted pre and post infusion. VSS upon completion of infusion. Red lumen heparin locked.     Discharge Plan:  Pt left [facility] in stable condition with his mother.

## 2020-07-24 ENCOUNTER — HOSPITAL ENCOUNTER (INPATIENT)
Facility: CLINIC | Age: 20
LOS: 21 days | Discharge: HOME | End: 2020-08-14
Attending: PEDIATRICS | Admitting: STUDENT IN AN ORGANIZED HEALTH CARE EDUCATION/TRAINING PROGRAM
Payer: COMMERCIAL

## 2020-07-24 ENCOUNTER — INFUSION THERAPY VISIT (OUTPATIENT)
Dept: INFUSION THERAPY | Facility: CLINIC | Age: 20
End: 2020-07-24
Attending: PEDIATRICS
Payer: COMMERCIAL

## 2020-07-24 ENCOUNTER — MEDICAL CORRESPONDENCE (OUTPATIENT)
Dept: TRANSPLANT | Facility: CLINIC | Age: 20
End: 2020-07-24

## 2020-07-24 ENCOUNTER — HOME INFUSION (PRE-WILLOW HOME INFUSION) (OUTPATIENT)
Dept: PHARMACY | Facility: CLINIC | Age: 20
End: 2020-07-24

## 2020-07-24 ENCOUNTER — ONCOLOGY VISIT (OUTPATIENT)
Dept: TRANSPLANT | Facility: CLINIC | Age: 20
End: 2020-07-24
Attending: PEDIATRICS
Payer: COMMERCIAL

## 2020-07-24 VITALS
DIASTOLIC BLOOD PRESSURE: 63 MMHG | TEMPERATURE: 98.8 F | HEART RATE: 93 BPM | WEIGHT: 131.61 LBS | SYSTOLIC BLOOD PRESSURE: 102 MMHG | BODY MASS INDEX: 19.38 KG/M2 | OXYGEN SATURATION: 100 % | RESPIRATION RATE: 16 BRPM

## 2020-07-24 DIAGNOSIS — Z91.89 AT RISK FOR INFECTION: ICD-10-CM

## 2020-07-24 DIAGNOSIS — Z94.81 S/P ALLOGENEIC BONE MARROW TRANSPLANT (H): ICD-10-CM

## 2020-07-24 DIAGNOSIS — C91.02 ALL (ACUTE LYMPHOID LEUKEMIA) IN RELAPSE (H): ICD-10-CM

## 2020-07-24 DIAGNOSIS — C91.00 ALL (ACUTE LYMPHOBLASTIC LEUKEMIA OF INFANT) (H): ICD-10-CM

## 2020-07-24 DIAGNOSIS — C91.02 ACUTE LYMPHOBLASTIC LEUKEMIA (ALL) IN RELAPSE (H): Primary | ICD-10-CM

## 2020-07-24 DIAGNOSIS — C91.01 ACUTE LYMPHOBLASTIC LEUKEMIA (ALL) IN REMISSION (H): ICD-10-CM

## 2020-07-24 DIAGNOSIS — R11.0 NAUSEA: ICD-10-CM

## 2020-07-24 DIAGNOSIS — Z78.9 ON TOTAL PARENTERAL NUTRITION: ICD-10-CM

## 2020-07-24 PROBLEM — D70.9 NEUTROPENIC FEVER (H): Status: ACTIVE | Noted: 2020-07-24

## 2020-07-24 PROBLEM — R50.81 NEUTROPENIC FEVER (H): Status: ACTIVE | Noted: 2020-07-24

## 2020-07-24 LAB
ALBUMIN SERPL-MCNC: 3.5 G/DL (ref 3.4–5)
ALP SERPL-CCNC: 82 U/L (ref 40–150)
ALT SERPL W P-5'-P-CCNC: 215 U/L (ref 0–70)
ANION GAP SERPL CALCULATED.3IONS-SCNC: 5 MMOL/L (ref 3–14)
AST SERPL W P-5'-P-CCNC: 58 U/L (ref 0–45)
BILIRUB SERPL-MCNC: 0.8 MG/DL (ref 0.2–1.3)
BLD PROD TYP BPU: NORMAL
BLD UNIT ID BPU: 0
BLOOD PRODUCT CODE: NORMAL
BPU ID: NORMAL
BUN SERPL-MCNC: 21 MG/DL (ref 7–30)
CALCIUM SERPL-MCNC: 8.5 MG/DL (ref 8.5–10.1)
CHLORIDE SERPL-SCNC: 106 MMOL/L (ref 94–109)
CO2 SERPL-SCNC: 25 MMOL/L (ref 20–32)
CREAT SERPL-MCNC: 0.65 MG/DL (ref 0.66–1.25)
DIFFERENTIAL METHOD BLD: ABNORMAL
DIFFERENTIAL METHOD BLD: ABNORMAL
ERYTHROCYTE [DISTWIDTH] IN BLOOD BY AUTOMATED COUNT: 14.2 % (ref 10–15)
ERYTHROCYTE [DISTWIDTH] IN BLOOD BY AUTOMATED COUNT: 14.5 % (ref 10–15)
GFR SERPL CREATININE-BSD FRML MDRD: >90 ML/MIN/{1.73_M2}
GLUCOSE SERPL-MCNC: 137 MG/DL (ref 70–99)
HCT VFR BLD AUTO: 22.1 % (ref 40–53)
HCT VFR BLD AUTO: 23.8 % (ref 40–53)
HGB BLD-MCNC: 7.7 G/DL (ref 13.3–17.7)
HGB BLD-MCNC: 8 G/DL (ref 13.3–17.7)
LABORATORY COMMENT REPORT: NORMAL
MAGNESIUM SERPL-MCNC: 1.9 MG/DL (ref 1.6–2.3)
MCH RBC QN AUTO: 27.2 PG (ref 26.5–33)
MCH RBC QN AUTO: 27.5 PG (ref 26.5–33)
MCHC RBC AUTO-ENTMCNC: 33.6 G/DL (ref 31.5–36.5)
MCHC RBC AUTO-ENTMCNC: 34.8 G/DL (ref 31.5–36.5)
MCV RBC AUTO: 78 FL (ref 78–100)
MCV RBC AUTO: 82 FL (ref 78–100)
NUM BPU REQUESTED: 1
NUM BPU REQUESTED: 1
PHOSPHATE SERPL-MCNC: 4.3 MG/DL (ref 2.5–4.5)
PLATELET # BLD AUTO: 10 10E9/L (ref 150–450)
PLATELET # BLD AUTO: 14 10E9/L (ref 150–450)
POTASSIUM SERPL-SCNC: 4 MMOL/L (ref 3.4–5.3)
PROT SERPL-MCNC: 7 G/DL (ref 6.8–8.8)
RBC # BLD AUTO: 2.83 10E12/L (ref 4.4–5.9)
RBC # BLD AUTO: 2.91 10E12/L (ref 4.4–5.9)
SARS-COV-2 RNA SPEC QL NAA+PROBE: NEGATIVE
SARS-COV-2 RNA SPEC QL NAA+PROBE: NORMAL
SODIUM SERPL-SCNC: 136 MMOL/L (ref 133–144)
SPECIMEN SOURCE: NORMAL
SPECIMEN SOURCE: NORMAL
TRANSFUSION STATUS PATIENT QL: NORMAL
TRANSFUSION STATUS PATIENT QL: NORMAL
URATE SERPL-MCNC: 1.9 MG/DL (ref 3.5–7.2)
WBC # BLD AUTO: 0.2 10E9/L (ref 4–11)
WBC # BLD AUTO: 0.3 10E9/L (ref 4–11)

## 2020-07-24 PROCEDURE — 25800030 ZZH RX IP 258 OP 636: Mod: ZF | Performed by: PEDIATRICS

## 2020-07-24 PROCEDURE — 25000128 H RX IP 250 OP 636: Mod: ZF | Performed by: PEDIATRICS

## 2020-07-24 PROCEDURE — 86850 RBC ANTIBODY SCREEN: CPT | Performed by: STUDENT IN AN ORGANIZED HEALTH CARE EDUCATION/TRAINING PROGRAM

## 2020-07-24 PROCEDURE — 83735 ASSAY OF MAGNESIUM: CPT | Performed by: NURSE PRACTITIONER

## 2020-07-24 PROCEDURE — U0003 INFECTIOUS AGENT DETECTION BY NUCLEIC ACID (DNA OR RNA); SEVERE ACUTE RESPIRATORY SYNDROME CORONAVIRUS 2 (SARS-COV-2) (CORONAVIRUS DISEASE [COVID-19]), AMPLIFIED PROBE TECHNIQUE, MAKING USE OF HIGH THROUGHPUT TECHNOLOGIES AS DESCRIBED BY CMS-2020-01-R: HCPCS | Performed by: STUDENT IN AN ORGANIZED HEALTH CARE EDUCATION/TRAINING PROGRAM

## 2020-07-24 PROCEDURE — 25000128 H RX IP 250 OP 636: Performed by: STUDENT IN AN ORGANIZED HEALTH CARE EDUCATION/TRAINING PROGRAM

## 2020-07-24 PROCEDURE — 87040 BLOOD CULTURE FOR BACTERIA: CPT | Performed by: STUDENT IN AN ORGANIZED HEALTH CARE EDUCATION/TRAINING PROGRAM

## 2020-07-24 PROCEDURE — 84550 ASSAY OF BLOOD/URIC ACID: CPT | Performed by: NURSE PRACTITIONER

## 2020-07-24 PROCEDURE — 86900 BLOOD TYPING SEROLOGIC ABO: CPT | Performed by: STUDENT IN AN ORGANIZED HEALTH CARE EDUCATION/TRAINING PROGRAM

## 2020-07-24 PROCEDURE — 86922 COMPATIBILITY TEST ANTIGLOB: CPT | Performed by: STUDENT IN AN ORGANIZED HEALTH CARE EDUCATION/TRAINING PROGRAM

## 2020-07-24 PROCEDURE — 3E0436Z INTRODUCTION OF NUTRITIONAL SUBSTANCE INTO CENTRAL VEIN, PERCUTANEOUS APPROACH: ICD-10-PCS | Performed by: PEDIATRICS

## 2020-07-24 PROCEDURE — 25000128 H RX IP 250 OP 636: Mod: ZF

## 2020-07-24 PROCEDURE — 25800030 ZZH RX IP 258 OP 636: Performed by: PEDIATRICS

## 2020-07-24 PROCEDURE — 20600000 ZZH R&B BMT

## 2020-07-24 PROCEDURE — 25000128 H RX IP 250 OP 636: Performed by: PEDIATRICS

## 2020-07-24 PROCEDURE — 84100 ASSAY OF PHOSPHORUS: CPT | Performed by: NURSE PRACTITIONER

## 2020-07-24 PROCEDURE — 25800030 ZZH RX IP 258 OP 636: Performed by: STUDENT IN AN ORGANIZED HEALTH CARE EDUCATION/TRAINING PROGRAM

## 2020-07-24 PROCEDURE — 80053 COMPREHEN METABOLIC PANEL: CPT | Performed by: NURSE PRACTITIONER

## 2020-07-24 PROCEDURE — 25000132 ZZH RX MED GY IP 250 OP 250 PS 637: Performed by: STUDENT IN AN ORGANIZED HEALTH CARE EDUCATION/TRAINING PROGRAM

## 2020-07-24 PROCEDURE — P9037 PLATE PHERES LEUKOREDU IRRAD: HCPCS | Performed by: STUDENT IN AN ORGANIZED HEALTH CARE EDUCATION/TRAINING PROGRAM

## 2020-07-24 PROCEDURE — 85027 COMPLETE CBC AUTOMATED: CPT

## 2020-07-24 PROCEDURE — 25000132 ZZH RX MED GY IP 250 OP 250 PS 637: Mod: ZF | Performed by: NURSE PRACTITIONER

## 2020-07-24 PROCEDURE — 96413 CHEMO IV INFUSION 1 HR: CPT

## 2020-07-24 PROCEDURE — 86901 BLOOD TYPING SEROLOGIC RH(D): CPT | Performed by: STUDENT IN AN ORGANIZED HEALTH CARE EDUCATION/TRAINING PROGRAM

## 2020-07-24 PROCEDURE — 87103 BLOOD FUNGUS CULTURE: CPT | Performed by: STUDENT IN AN ORGANIZED HEALTH CARE EDUCATION/TRAINING PROGRAM

## 2020-07-24 PROCEDURE — 3E04305 INTRODUCTION OF OTHER ANTINEOPLASTIC INTO CENTRAL VEIN, PERCUTANEOUS APPROACH: ICD-10-PCS | Performed by: PEDIATRICS

## 2020-07-24 RX ORDER — DIPHENHYDRAMINE HCL 25 MG
50 CAPSULE ORAL EVERY 6 HOURS PRN
Status: DISCONTINUED | OUTPATIENT
Start: 2020-07-24 | End: 2020-07-28

## 2020-07-24 RX ORDER — HEPARIN SODIUM,PORCINE 10 UNIT/ML
VIAL (ML) INTRAVENOUS
Status: COMPLETED
Start: 2020-07-24 | End: 2020-07-24

## 2020-07-24 RX ORDER — HEPARIN SODIUM,PORCINE 10 UNIT/ML
5-10 VIAL (ML) INTRAVENOUS EVERY 24 HOURS
Status: DISCONTINUED | OUTPATIENT
Start: 2020-07-24 | End: 2020-08-14 | Stop reason: HOSPADM

## 2020-07-24 RX ORDER — MICAFUNGIN 10 MG/ML
50 INJECTION, POWDER, LYOPHILIZED, FOR SOLUTION INTRAVENOUS DAILY
Status: DISCONTINUED | OUTPATIENT
Start: 2020-07-24 | End: 2020-07-24

## 2020-07-24 RX ORDER — OXYCODONE HYDROCHLORIDE 5 MG/1
5 TABLET ORAL EVERY 6 HOURS PRN
Status: DISCONTINUED | OUTPATIENT
Start: 2020-07-24 | End: 2020-08-06

## 2020-07-24 RX ORDER — DIPHENHYDRAMINE HCL 25 MG
25 CAPSULE ORAL EVERY 6 HOURS PRN
Status: DISCONTINUED | OUTPATIENT
Start: 2020-07-24 | End: 2020-07-24

## 2020-07-24 RX ORDER — VALACYCLOVIR HYDROCHLORIDE 500 MG/1
1000 TABLET, FILM COATED ORAL 3 TIMES DAILY
Status: DISCONTINUED | OUTPATIENT
Start: 2020-07-24 | End: 2020-08-14 | Stop reason: HOSPADM

## 2020-07-24 RX ORDER — LIDOCAINE 40 MG/G
CREAM TOPICAL
Status: DISCONTINUED | OUTPATIENT
Start: 2020-07-24 | End: 2020-08-14 | Stop reason: HOSPADM

## 2020-07-24 RX ORDER — GRANISETRON HYDROCHLORIDE 1 MG/1
1 TABLET, FILM COATED ORAL 2 TIMES DAILY
Status: DISCONTINUED | OUTPATIENT
Start: 2020-07-24 | End: 2020-07-25

## 2020-07-24 RX ORDER — ACETAMINOPHEN 325 MG/1
650 TABLET ORAL ONCE
Status: DISCONTINUED | OUTPATIENT
Start: 2020-07-24 | End: 2020-07-24 | Stop reason: HOSPADM

## 2020-07-24 RX ORDER — HEPARIN SODIUM,PORCINE 10 UNIT/ML
5-10 VIAL (ML) INTRAVENOUS
Status: DISCONTINUED | OUTPATIENT
Start: 2020-07-24 | End: 2020-08-14 | Stop reason: HOSPADM

## 2020-07-24 RX ORDER — DIPHENHYDRAMINE HCL 25 MG
50 CAPSULE ORAL EVERY 6 HOURS PRN
Status: DISCONTINUED | OUTPATIENT
Start: 2020-07-24 | End: 2020-07-24

## 2020-07-24 RX ORDER — ACETAMINOPHEN 325 MG/1
650 TABLET ORAL EVERY 4 HOURS PRN
Status: DISCONTINUED | OUTPATIENT
Start: 2020-07-24 | End: 2020-07-24

## 2020-07-24 RX ORDER — DIPHENHYDRAMINE HYDROCHLORIDE 50 MG/ML
50 INJECTION INTRAMUSCULAR; INTRAVENOUS EVERY 6 HOURS PRN
Status: CANCELLED
Start: 2020-07-24

## 2020-07-24 RX ORDER — HEPARIN SODIUM,PORCINE 10 UNIT/ML
2-4 VIAL (ML) INTRAVENOUS
Status: DISCONTINUED | OUTPATIENT
Start: 2020-07-24 | End: 2020-07-24 | Stop reason: HOSPADM

## 2020-07-24 RX ORDER — HEPARIN SODIUM,PORCINE 10 UNIT/ML
2-4 VIAL (ML) INTRAVENOUS EVERY 24 HOURS
Status: DISCONTINUED | OUTPATIENT
Start: 2020-07-24 | End: 2020-07-24 | Stop reason: HOSPADM

## 2020-07-24 RX ORDER — ACETAMINOPHEN 325 MG/1
650 TABLET ORAL ONCE
Status: CANCELLED
Start: 2020-07-24

## 2020-07-24 RX ORDER — SULFAMETHOXAZOLE/TRIMETHOPRIM 800-160 MG
1 TABLET ORAL
Status: DISCONTINUED | OUTPATIENT
Start: 2020-08-24 | End: 2020-08-14 | Stop reason: HOSPADM

## 2020-07-24 RX ORDER — ACETAMINOPHEN 325 MG/1
325 TABLET ORAL EVERY 6 HOURS PRN
Status: DISCONTINUED | OUTPATIENT
Start: 2020-07-24 | End: 2020-07-24

## 2020-07-24 RX ORDER — ACETAMINOPHEN 325 MG/1
650 TABLET ORAL EVERY 6 HOURS PRN
Status: DISCONTINUED | OUTPATIENT
Start: 2020-07-24 | End: 2020-07-25

## 2020-07-24 RX ORDER — NALOXONE HYDROCHLORIDE 0.4 MG/ML
.1-.4 INJECTION, SOLUTION INTRAMUSCULAR; INTRAVENOUS; SUBCUTANEOUS
Status: DISCONTINUED | OUTPATIENT
Start: 2020-07-24 | End: 2020-08-13

## 2020-07-24 RX ORDER — CHLORHEXIDINE GLUCONATE ORAL RINSE 1.2 MG/ML
15 SOLUTION DENTAL 2 TIMES DAILY
Status: DISCONTINUED | OUTPATIENT
Start: 2020-07-24 | End: 2020-07-27

## 2020-07-24 RX ORDER — DIPHENHYDRAMINE HYDROCHLORIDE 50 MG/ML
50 INJECTION INTRAMUSCULAR; INTRAVENOUS EVERY 6 HOURS PRN
Status: DISCONTINUED | OUTPATIENT
Start: 2020-07-24 | End: 2020-07-25

## 2020-07-24 RX ORDER — CALCIUM CARBONATE 500 MG/1
500-1000 TABLET, CHEWABLE ORAL DAILY PRN
Status: DISCONTINUED | OUTPATIENT
Start: 2020-07-24 | End: 2020-08-14 | Stop reason: HOSPADM

## 2020-07-24 RX ORDER — HEPARIN SODIUM,PORCINE 10 UNIT/ML
VIAL (ML) INTRAVENOUS
Status: DISCONTINUED
Start: 2020-07-24 | End: 2020-07-24 | Stop reason: HOSPADM

## 2020-07-24 RX ORDER — SODIUM CHLORIDE 9 MG/ML
INJECTION, SOLUTION INTRAVENOUS CONTINUOUS
Status: DISCONTINUED | OUTPATIENT
Start: 2020-07-24 | End: 2020-08-06

## 2020-07-24 RX ORDER — DIPHENHYDRAMINE HYDROCHLORIDE 50 MG/ML
50 INJECTION INTRAMUSCULAR; INTRAVENOUS EVERY 6 HOURS PRN
Status: DISCONTINUED | OUTPATIENT
Start: 2020-07-24 | End: 2020-07-24 | Stop reason: HOSPADM

## 2020-07-24 RX ORDER — LORAZEPAM 0.5 MG/1
0.5 TABLET ORAL EVERY 6 HOURS PRN
Status: DISCONTINUED | OUTPATIENT
Start: 2020-07-24 | End: 2020-08-07

## 2020-07-24 RX ORDER — HEPARIN SODIUM (PORCINE) LOCK FLUSH IV SOLN 100 UNIT/ML 100 UNIT/ML
5 SOLUTION INTRAVENOUS
Status: DISCONTINUED | OUTPATIENT
Start: 2020-07-24 | End: 2020-08-14 | Stop reason: HOSPADM

## 2020-07-24 RX ORDER — PANTOPRAZOLE SODIUM 20 MG/1
40 TABLET, DELAYED RELEASE ORAL DAILY
Status: DISCONTINUED | OUTPATIENT
Start: 2020-07-25 | End: 2020-08-10

## 2020-07-24 RX ORDER — GRANISETRON HYDROCHLORIDE 1 MG/1
1 TABLET, FILM COATED ORAL ONCE
Status: COMPLETED | OUTPATIENT
Start: 2020-07-24 | End: 2020-07-24

## 2020-07-24 RX ADMIN — Medication 150 MG: at 19:01

## 2020-07-24 RX ADMIN — CEFEPIME 2000 MG: 2 INJECTION, POWDER, FOR SOLUTION INTRAVENOUS at 23:52

## 2020-07-24 RX ADMIN — CEFEPIME 2000 MG: 2 INJECTION, POWDER, FOR SOLUTION INTRAVENOUS at 16:03

## 2020-07-24 RX ADMIN — MICAFUNGIN SODIUM 50 MG: 10 INJECTION, POWDER, LYOPHILIZED, FOR SOLUTION INTRAVENOUS at 20:22

## 2020-07-24 RX ADMIN — FLUDARABINE PHOSPHATE 50 MG: 25 INJECTION, SOLUTION INTRAVENOUS at 10:08

## 2020-07-24 RX ADMIN — VALACYCLOVIR HYDROCHLORIDE 1000 MG: 500 TABLET, FILM COATED ORAL at 16:03

## 2020-07-24 RX ADMIN — VALACYCLOVIR HYDROCHLORIDE 1000 MG: 500 TABLET, FILM COATED ORAL at 19:01

## 2020-07-24 RX ADMIN — SODIUM CHLORIDE: 900 INJECTION INTRAVENOUS at 23:53

## 2020-07-24 RX ADMIN — Medication 150 MG: at 16:08

## 2020-07-24 RX ADMIN — GRANISETRON HYDROCHLORIDE 1 MG: 1 TABLET ORAL at 09:40

## 2020-07-24 RX ADMIN — DIPHENHYDRAMINE HYDROCHLORIDE 50 MG: 50 INJECTION, SOLUTION INTRAMUSCULAR; INTRAVENOUS at 17:55

## 2020-07-24 RX ADMIN — ACETAMINOPHEN 650 MG: 325 TABLET, FILM COATED ORAL at 17:19

## 2020-07-24 RX ADMIN — SODIUM CHLORIDE, PRESERVATIVE FREE 50 UNITS: 5 INJECTION INTRAVENOUS at 13:32

## 2020-07-24 RX ADMIN — CHLORHEXIDINE GLUCONATE 0.12% ORAL RINSE 15 ML: 1.2 LIQUID ORAL at 19:01

## 2020-07-24 RX ADMIN — SODIUM CHLORIDE 50 ML: 9 INJECTION, SOLUTION INTRAVENOUS at 10:15

## 2020-07-24 RX ADMIN — SODIUM CHLORIDE: 900 INJECTION INTRAVENOUS at 15:58

## 2020-07-24 RX ADMIN — HEPARIN, PORCINE (PF) 10 UNIT/ML INTRAVENOUS SYRINGE 50 UNITS: at 13:32

## 2020-07-24 RX ADMIN — GRANISETRON HYDROCHLORIDE 1 MG: 1 TABLET ORAL at 19:01

## 2020-07-24 RX ADMIN — HEPARIN, PORCINE (PF) 10 UNIT/ML INTRAVENOUS SYRINGE 4 ML: at 09:02

## 2020-07-24 NOTE — PROGRESS NOTES
Infusion Note: Infusion Nursing Note    Artemio Nino Presents to [facility] today for:C1D4 fludarabine    Due to : 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)    Intravenous Access/Labs: Labs drawn per red lumen of CVC. Purple lumen heparin locked per patient request.    Coping:   Child Family Life declined    Infusion Note: Parameters met for fludarabine. Oral kytril administered per orders. Fludarabine administered without issue. Blood return noted pre and post infusion. Patient noted he felt hot at 1152. Oral temp was 99.8. Patient observed post chemo for approximately 2 hours. Max temperature was 99.9F. Provider Dr. Zavala was notified. Ok for patient to go home. Gave patient a thermometer and confirm he had contact numbers incase of fever at home.  Red lumen heparin locked.     Discharge Plan:  Pt left [facility] in stable condition with his mother.

## 2020-07-24 NOTE — H&P
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 He had bridging therapy with oral 6MP, methotrexate, and ruxolitinib, which was stopped 7/12. He has had two admissions since his relapse for fever, one in early July at  and most recently 7/17 to 7/20 at [facility]. Blood cultures were negative.      Isael completed his last cycle of non-myleoablative chemotherapy in the [facility] clinic today in prep for [facility]. He had a temperature of 99.9F at the time of clinic discharge and then spiked to 101.8F when he returned to his hotel. He reportedly was diaphoretic all day. He had a planned admission previously scheduled for 7/26. No URI symptoms. He says that he struggles with his oral medications due to nausea and mucositis, particularly levaquin. Generalized nausea improving with declining appetite. He remains on nutritional support with TPN. No diarrhea or rash. He says his mucositis is mild and mainly effects his throat but improving. He has oxycodone but prefers not to use it very often.     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, now in relapse  Aaron-Parkinson-White syndrome  Vitamin D deficieny  Spinal headache  Port-a-cath placement (6/10/20)    ROS: A complete review of systems is negative except as noted in HPI    Past Surgical History  Past Surgical History:   Procedure Laterality Date     BONE MARROW BIOPSY, BONE SPECIMEN, NEEDLE/TROCAR Right 11/27/2018    Procedure: bone marrow biopsy;  Surgeon: Jacqueline Fitzgerald NP;  [facility]      BONE MARROW BIOPSY, BONE SPECIMEN, NEEDLE/TROCAR N/A 2/8/2019    Procedure: BIOPSY BONE MARROW;  Surgeon:  Lisa Workman NP;  [facility]: [facility]      BONE MARROW BIOPSY, BONE SPECIMEN, NEEDLE/TROCAR N/A 5/16/2019    Procedure: BIOPSY, BONE MARROW;  Surgeon: Lilia López APRN CNP;  [facility]: [facility]     BONE MARROW BIOPSY, BONE SPECIMEN, NEEDLE/TROCAR N/A 11/7/2019    Procedure: Bone marrow biopsy;  Surgeon: Jacqueline Shin, NP;  [facility]: [facility]      BONE MARROW BIOPSY, BONE SPECIMEN, NEEDLE/TROCAR Right 6/10/2020    Procedure: BIOPSY, BONE MARROW;  Surgeon: Candido Han PA-C;  [facility]      ESOPHAGOSCOPY, GASTROSCOPY, DUODENOSCOPY (EGD), COMBINED N/A 2/22/2019    Procedure: Upper endoscopy with biopsy;  Surgeon: Magdalene Hobson MD;  [facility]: [facility]      INSERT CATHETER VASCULAR ACCESS N/A 7/20/2020    Procedure: NON -APHERESIS Double lumen tunneled central burns line placement;  Surgeon: Pato Gómez PA-C;  [facility]: [facility]      INSERT CATHETER VASCULAR ACCESS APHERESIS CHILD N/A 6/10/2020    Procedure: Large Bore Double Lumen NON TUNNELED Apheresis Catheter placement;  Surgeon: Link Rios PA-C;  [facility]      INSERT CATHETER VASCULAR ACCESS DOUBLE LUMEN CHILD N/A 10/26/2018    Procedure: double lumen tunneled line placement;  Surgeon: Rex Valente MD;  [facility]      INSERT PORT VASCULAR ACCESS N/A 6/10/2020    Procedure: Port placement;  Surgeon: Link Rios PA-C;  [facility]      IR CHEST PORT PLACEMENT > 5 YRS OF AGE  6/10/2020     IR CVC NON TUNNEL LINE REMOVAL  6/12/2020     IR CVC NON TUNNEL PLACEMENT  6/10/2020     IR CVC TUNNEL PLACEMENT > 5 YRS OF AGE  7/20/2020     IR CVC TUNNEL REMOVAL LEFT  2/8/2019     IR PORT REMOVAL LEFT  5/16/2019     O CLAVICLE LEFT Left     fracture with surgical repair and plate/screws     ORTHOPEDIC SURGERY      femur     REMOVE CATHETER VASCULAR ACCESS N/A 2/8/2019    Procedure: Tunneled line removal;  Surgeon: Isaias  Krystal Jiménez MD;  [facility]      REMOVE PORT VASCULAR ACCESS N/A 5/16/2019    Procedure: REMOVAL, VASCULAR ACCESS PORT;  Surgeon: Link Rios PA-C;  [facility]: [facility]       Family History:  Maternal grandfather: Thrombocytopenia  Paternal grandfather: Prostate cancer, type 2 diabetes.     Social History: His mother (Mervat) temporarily relocated from [place] to be his full-time caregiver. Lodging through [facility]. His father (Dangelo), stepmother (Camryn) and paternal grandparents are all involved in his care. Isael has authorized communication with all of them.    Medications  [COMPLETED] 0.9% sodium chloride BOLUS  [COMPLETED] FLUdarabine (FLUDARA) 50 mg in sodium chloride 0.9 % 112 mL infusion  [COMPLETED] granisetron (KYTRIL) tablet 1 mg    allopurinol (ZYLOPRIM) 300 MG tablet, Take 0.5 tablets (150 mg) by mouth 3 times daily for 7 days  calcium carbonate (TUMS) 500 MG chewable tablet, Take 1-2 chew tab by mouth daily as needed for heartburn  chlorhexidine (PERIDEX) 0.12 % solution, Swish and spit 15 mLs in mouth 2 times daily  diphenhydrAMINE (BENADRYL) 50 MG capsule, Take 1 capsule (50 mg) by mouth every 6 hours as needed for sleep or other (Nausea)  granisetron (KYTRIL) 1 MG tablet, Take 1 tablet (1 mg) by mouth 2 times daily for 14 days  levofloxacin (LEVAQUIN) 500 MG tablet, Take 1 tablet (500 mg) by mouth daily for 14 days  LORazepam (ATIVAN) 0.5 MG tablet, Take 1 tablet (0.5 mg) by mouth every 6 hours as needed for nausea  melatonin 5 MG tablet, Take 5-10 mg by mouth nightly as needed for sleep  micafungin (MYCAMINE) 50 MG injection, Inject 5 mLs (50 mg) into the vein daily  oxyCODONE (ROXICODONE) 5 MG tablet, Take 1 tablet (5 mg) by mouth every 6 hours as needed for severe pain (Cancer related pain)  pantoprazole (PROTONIX) 40 MG EC tablet, Take 1 tablet (40 mg) by mouth daily  Parenteral Nutrition, Inject 1 Bag into the vein daily  sulfamethoxazole-trimethoprim (BACTRIM  DS/SEPTRA DS) 800-160 MG per tablet, Take 1 tablet by mouth Every Mon, Tues two times daily (Patient taking differently: Take 1 tablet by mouth Every Mon, Tues two times daily Missed dosing on 7/13 & 14 - instructed to take on Saturday and Sunday (7/18 & 19) prior to initiating chemo, plan to resume again on day +28 (8/24/2020))  traMADol (ULTRAM) 50 MG tablet, Take 50 mg by mouth every 6 hours as needed for pain (mild or anticipated)  valACYclovir (VALTREX) 1000 mg tablet, Take 1 tablet (1,000 mg) by mouth 3 times daily for 28 days      Allergies      Allergies   Allergen Reactions     Blood Transfusion Related (Informational Only) Other (See Comments)     Patient has a history of a clinically significant antibody against RBC antigens.  A delay in compatible RBCs may occur.Stem cell transplant patient.  Give type O RBCs.  Requires Benadryl as premed for platelets for history of hives     Voriconazole Photosensitivity     Significant rash - do not rechallenge     Physical Exam   Temp:  [98.8  F (37.1  C)-99.9  F (37.7  C)] 98.8  F (37.1  C)  Pulse:  [] 93  Resp:  [16] 16  BP: (102-106)/(63-73) 102/63  SpO2:  [100 %] 100 %  GEN: Lying in bed in NAD. Pleasant and cooperative. Mother present  HEENT: Head NC/AT, full head of hair, TMs clear bilaterally, sclerae clear, PERRL, nares patent, OP appeared clear without lesions, MMM  CARD: RRR, normal S1/S2 without murmur. Cap refill < 2 sec  RESP: Lungs CTA bilaterally without adventitious lung sounds  ABD: Soft NT, ND, no organomegaly.  EXTREM: WWP, MAEE  SKIN: Clear without erythema, rash or lesions.  ACCESS: CVC and port    Labs  Results for orders placed or performed in visit on 07/24/20 (from the past 24 hour(s))   CBC with platelets differential   Result Value Ref Range    WBC 0.3 (LL) 4.0 - 11.0 10e9/L    RBC Count 2.91 (L) 4.4 - 5.9 10e12/L    Hemoglobin 8.0 (L) 13.3 - 17.7 g/dL    Hematocrit 23.8 (L) 40.0 - 53.0 %    MCV 82 78 - 100 fl    MCH 27.5 26.5 - 33.0  pg    MCHC 33.6 31.5 - 36.5 g/dL    RDW 14.5 10.0 - 15.0 %    Platelet Count 14 (LL) 150 - 450 10e9/L    Diff Method WBC <0.5, Diff not done    Comprehensive metabolic panel   Result Value Ref Range    Sodium 136 133 - 144 mmol/L    Potassium 4.0 3.4 - 5.3 mmol/L    Chloride 106 94 - 109 mmol/L    Carbon Dioxide 25 20 - 32 mmol/L    Anion Gap 5 3 - 14 mmol/L    Glucose 137 (H) 70 - 99 mg/dL    Urea Nitrogen 21 7 - 30 mg/dL    Creatinine 0.65 (L) 0.66 - 1.25 mg/dL    GFR Estimate >90 >60 mL/min/[1.73_m2]    GFR Estimate If Black >90 >60 mL/min/[1.73_m2]    Calcium 8.5 8.5 - 10.1 mg/dL    Bilirubin Total 0.8 0.2 - 1.3 mg/dL    Albumin 3.5 3.4 - 5.0 g/dL    Protein Total 7.0 6.8 - 8.8 g/dL    Alkaline Phosphatase 82 40 - 150 U/L     (H) 0 - 70 U/L    AST 58 (H) 0 - 45 U/L   Magnesium   Result Value Ref Range    Magnesium 1.9 1.6 - 2.3 mg/dL   Phosphorus   Result Value Ref Range    Phosphorus 4.3 2.5 - 4.5 mg/dL   Uric acid   Result Value Ref Range    Uric Acid 1.9 (L) 3.5 - 7.2 mg/dL   Platelets prepare order unit   Result Value Ref Range    Blood Component Type PLT Pheresis     Units Ordered 1        Assessment and Plan Artemio Nino is a  20 year old male with 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 CD19 positive (dim).      Day +4 and final day of his non-myleoablative chemotherapy prep in the [facility] clinic today and then spiked a fever to 101.8F at home this afternoon. He was previously scheduled for admission on 7/26 prior to [facility] on 7/27. Struggling with oral meds due to nausea and mucositis although now improving. He remains on TPN/IL for nutritional support. Obtain blood cultures and begin Cefepime IV upon admission.      ALL and BMT: High risk B cell ALL (CNS negative) + JAK2 activation.  - Related bone marrow transplant 11/2/18  - Relapsed 6/5/2020, 19 months status  post matched related BMT per MT2015-29 (cytoxan/TBI arm) with CD19 (dim) relapsed b-cell lymphoblastic leukemia.  - Completed apheresis collection in the [facility] (6/2020).   - He has been receiving bridging chemotherapy with oral 6MP, methotrexate and ruxolitinib. All oral chemotherapy stopped on 7/12.   - BM this week showed 75% involvement with leukemia. CSF was negative.   - Outpatient lymphodepleting chemotherapy per MT2017-45 with cytoxan and fludarabine 7/21-7/24. Completed today in [facility].   - Hospital admission today due to fever. CAR-T infusion 7/27.     FEN/Renal:   # Risk for malnutrition: Declining appetite per Isael.  - Continue nutritional support with TPN/IL  - Age appropriate diet   - Monitor nutritional intake     # Risk for electrolyte abnormalities:  - Check daily electrolytes     # Risk for renal dysfunction/risk for ELIZ secondary to potential tumor lysis with preparatory chemotherapy.: GFR 133ml/min  - Daily electrolyte monitoring  - Monitor daily weights     # Risk for TLS: Uric Acid 1.9 today.  - Continues Allopurinol TID but generally only taking one dose daily due to nausea/mucositis     Pulmonary:  # Risk for pulmonary insufficiency: No current concerns. Normal chest and sinus CTs during work-up.      Cardiovascular:  Aaron-Parkinson-White Syndrome: diagnosed upon syncope in 02/2018. Asymptomatic, no interventions to date. No current concerns.  - EKG 7/15 revealed WPW with normal sinus rhythm.   - Echocardiogram 7/15 showed LVEF of 62%  - Cardiology to decide if they want to assess him.     Heme:   # Pancytopenia secondary to chemotherapy:  - Transfuse for hemoglobin < 7, platelets < 10,000  - New history of significant hives with platelet transfusions x last 5 transfusions. Premedicate with tylenol, hydrocortisone, and benadryl for platelets. May need benadryl mid-med  - No premedications needed for RBC transfusions. Last RBC transfusion approximately two weeks  ago.  - No GCSF per protocol     Infectious Disease:  # Febrile Neutropenia: Tmax 101.8F. Begin cefepime IV and discharge levo  - Obtain blood cultures from CVC and port     # Risk for infection given immunocompromised status:    Active: See above  Prophylaxis:                                                                      - Viral prophylaxis: HD Valtrex continue through Day +30  - Fungal prophylaxis: Micafungin continue through Day +30. History of photoxic drug eruption with voriconazole, avoid use.   - Bacterial prophylaxis: Discontinue levo as above and begin cefepime IV  - PCP prophylaxis: Bactrim to begin day +28      GI:   # Nausea:  - Scheduled medications: Kytril BID  - PRN medications: Benadryl PRN     # Acid reflux:  Protonix once daily. Continue TUMS prn.     # Transaminitis:  Possibly related to recent chemotherapy. May warrant RUQ US to further assess persistent transamnitis.    - Stable today. , AST 58     # Abdominal pain and diarrhea: One episode overnight 7/22  - Consider stool collection if frequency increases     Neuro:  # Mucositis/pain: Throat pain (improving)  - Oxycodone PRN available but he doesn't use often  - Tramadol prn     Oral Hygiene:   # Receding gums, risk for infection  - Continue chlorhexidine mouth wash for symptomatic control     Derm:  No current rash. He is no longer using creams.     Fertility:  Sperm collected pre chemotherapy for fertility preservation     Discharge Considerations: Expected lengths of hospitalization for patients with complications from stem cell transplant vary based on the complication(s) and severity(ies). A typical stay is 7 days.     The above plan of care was developed by and communicated to me by the Pediatric BMT attending physician, MD Candido Jacinto PA-C  Pediatric Blood and Marrow Transplant Program  [facility]      BMT Attending Note:    Artemio Nino was  admitted today with a fever. Artemio is known to me, and he has been receiving chemotherapy prior to his scheduled CAR T therapy next week. He has been started on antibiotics, and we will continue to monitor cultures and his clinical status over the next few days. He has also been on TPN to augment his nutrition.     I have reviewed the vital signs, medications and lab results.  I assisted in formulating a plan, which was instituted by the BMT team. The total amount of time spent in the admission and care of Artemio Nino today was >40 minutes, at least 50% of which was counseling and coordination of care.    Candido Joshua MD  Professor, Dept. Of Pediatrics  Division of Blood and Marrow Transplantation          Patient Active Problem List   Diagnosis     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)     Aaron-Parkinson-White (WPW) syndrome     Bone marrow transplant candidate     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)     At risk for infection     S/P allogeneic bone marrow transplant (H)     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

## 2020-07-24 NOTE — PHARMACY-ADMISSION MEDICATION HISTORY
Pediatric BMT medication history for the 7/24/2020 admission is complete. See Epic admission navigator for allergy information, retail pharmacy, prior to admission medications and immunization status.     Pre-BMT pharmacy consult medication history was completed on 7/16 by Mary Kelly (under Erica May in Epic).  Please refer to the pharmacy note from that encounter for additional details.    Patient medications, recent/pending drug levels, and any outpatient IV therapies were reviewed and discussed with the admitting POLLO/hospitalist.     Patient preference for medications  Artemio prefers that medication comes as pills    Changes made to PTA medication list   Added: none  Deleted: Tramadol  Changed: none    PTA medications not ordered this admission  Bactrim will resume on day +28 (8/24)  Levofloxacin ppx broadened to cefepime on admission for fever    Outpatient IV therapies:  TPN/IL - see note from Alexa Ramirez for current recipe on 7/24  Micafungin  Supplied by [facility]    Additional information:  Artemio was supposed to admit for K[facility] infusion on 7/26, admitted early due to fever    Prior to Admission medications    Medication Sig Last Dose Taking? Auth Provider   allopurinol (ZYLOPRIM) 300 MG tablet Take 0.5 tablets (150 mg) by mouth 3 times daily for 7 days 7/24/2020 at 0800 Yes Cheng Ramos MD   calcium carbonate (TUMS) 500 MG chewable tablet Take 1-2 chew tab by mouth daily as needed for heartburn 7/23/2020 at 2200 Yes Unknown, Entered By History   granisetron (KYTRIL) 1 MG tablet Take 1 tablet (1 mg) by mouth 2 times daily for 14 days 7/24/2020 at 0900 Yes Cheng Ramos MD   levofloxacin (LEVAQUIN) 500 MG tablet Take 1 tablet (500 mg) by mouth daily for 14 days 7/23/2020 at 2100 Yes Cheng Ramos MD   micafungin (MYCAMINE) 50 MG injection Inject 5 mLs (50 mg) into the vein daily 7/23/2020 at 2000 Yes Lilia López APRN CNP   oxyCODONE  (ROXICODONE) 5 MG tablet Take 1 tablet (5 mg) by mouth every 6 hours as needed for severe pain (Cancer related pain) Past Week at Unknown time Yes Jaquelin Senior MD   pantoprazole (PROTONIX) 40 MG EC tablet Take 1 tablet (40 mg) by mouth daily 7/24/2020 at 0800 Yes Candido Han PA-C   Parenteral Nutrition Inject 1 Bag into the vein daily 7/23/2020 at Unknown time Yes Lilia López APRN CNP   valACYclovir (VALTREX) 1000 mg tablet Take 1 tablet (1,000 mg) by mouth 3 times daily for 28 days 7/24/2020 at 0800 Yes Cheng Ramos MD   chlorhexidine (PERIDEX) 0.12 % solution Swish and spit 15 mLs in mouth 2 times daily   John Núñez MD   diphenhydrAMINE (BENADRYL) 50 MG capsule Take 1 capsule (50 mg) by mouth every 6 hours as needed for sleep or other (Nausea) More than a month at Unknown time  Cheng Ramos MD   LORazepam (ATIVAN) 0.5 MG tablet Take 1 tablet (0.5 mg) by mouth every 6 hours as needed for nausea   Cheng Ramos MD   melatonin 5 MG tablet Take 5-10 mg by mouth nightly as needed for sleep   Unknown, Entered By History   sulfamethoxazole-trimethoprim (BACTRIM DS/SEPTRA DS) 800-160 MG per tablet Take 1 tablet by mouth Every Mon, Tues two times daily  Patient taking differently: Take 1 tablet by mouth Every Mon, Tues two times daily Missed dosing on 7/13 & 14 - instructed to take on Saturday and Sunday (7/18 & 19) prior to initiating chemo, plan to resume again on day +28 (8/24/2020)   Candido Joshua MD       Medication history completed by: Margaret Kelly, AndreD

## 2020-07-24 NOTE — DISCHARGE SUMMARY
Pediatric Bone Marrow Transplant Discharge Summary   [facility]     Admission Date: 7/24/2020  Discharge Date: 8/14/2020  Discharging Physician: Dr. 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 He had bridging therapy with oral 6MP, methotrexate, and ruxolitinib, which was stopped 7/12. He has had two admissions since his relapse for fever, one in early July at [facility] and most recently 7/17 to 7/20 at [facility]. Blood cultures were negative.      Isael completed his last cycle of non-myleoablative chemotherapy in the [facility] clinic today in prep for [facility]. He had a temperature of 99.9F at the time of clinic discharge and then spiked to 101.8F when he returned to his hotel. He reportedly was diaphoretic all day. He had a planned admission previously scheduled for 7/26. No URI symptoms. He says that he struggles with his oral medications due to nausea and mucositis, particularly levaquin. Generalized nausea improving with declining appetite. He remains on nutritional support with TPN. No diarrhea or rash. He says his mucositis is mild and mainly effects his throat but improving. He has oxycodone but prefers not to use it very often.    Past Medical History  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)      History of blood transfusion      WPW (Aaron-Parkinson-White syndrome) 03/2018       Past Surgical History  Past Surgical History:   Procedure Laterality Date     BONE MARROW BIOPSY, BONE SPECIMEN, NEEDLE/TROCAR Right 11/27/2018    Procedure: bone marrow biopsy;  Surgeon: Jacqueline Fitzgerald NP;  [facility]: [facility]      BONE MARROW BIOPSY, BONE SPECIMEN, NEEDLE/TROCAR N/A 2/8/2019    Procedure: BIOPSY BONE MARROW;  Surgeon: Lisa Workman  NP;  [facility]      BONE MARROW BIOPSY, BONE SPECIMEN, NEEDLE/TROCAR N/A 5/16/2019    Procedure: BIOPSY, BONE MARROW;  Surgeon: Lilia López APRN CNP;  Location: [facility] OR     BONE MARROW BIOPSY, BONE SPECIMEN, NEEDLE/TROCAR N/A 11/7/2019    Procedure: Bone marrow biopsy;  Surgeon: Jacqueline Shin, NP;  [facility]: [facility]      BONE MARROW BIOPSY, BONE SPECIMEN, NEEDLE/TROCAR Right 6/10/2020    Procedure: BIOPSY, BONE MARROW;  Surgeon: Candido Han PA-C;  [facility]      ESOPHAGOSCOPY, GASTROSCOPY, DUODENOSCOPY (EGD), COMBINED N/A 2/22/2019    Procedure: Upper endoscopy with biopsy;  Surgeon: Magdalene Hobson MD;  [facility]      INSERT CATHETER VASCULAR ACCESS N/A 7/20/2020    Procedure: NON -APHERESIS Double lumen tunneled central burns line placement;  Surgeon: Pato Gómez PA-C;  [facility]      INSERT CATHETER VASCULAR ACCESS APHERESIS CHILD N/A 6/10/2020    Procedure: Large Bore Double Lumen NON TUNNELED Apheresis Catheter placement;  Surgeon: Link Rios PA-C;  [facility]      INSERT CATHETER VASCULAR ACCESS DOUBLE LUMEN CHILD N/A 10/26/2018    Procedure: double lumen tunneled line placement;  Surgeon: Rex Valente MD;  [facility]      INSERT PORT VASCULAR ACCESS N/A 6/10/2020    Procedure: Port placement;  Surgeon: Link Rios PA-C;  [facility]      IR CHEST PORT PLACEMENT > 5 YRS OF AGE  6/10/2020     IR CVC NON TUNNEL LINE REMOVAL  6/12/2020     IR CVC NON TUNNEL PLACEMENT  6/10/2020     IR CVC TUNNEL PLACEMENT > 5 YRS OF AGE  7/20/2020     IR CVC TUNNEL REMOVAL LEFT  2/8/2019     IR PORT REMOVAL LEFT  5/16/2019     O CLAVICLE LEFT Left     fracture with surgical repair and plate/screws     ORTHOPEDIC SURGERY      femur     REMOVE CATHETER VASCULAR ACCESS N/A 2/8/2019    Procedure: Tunneled line removal;  Surgeon: Krystal Esparza MD;   [facility]      REMOVE PORT VASCULAR ACCESS N/A 5/16/2019    Procedure: REMOVAL, VASCULAR ACCESS PORT;  Surgeon: Link Rios PA-C;  [facility]: [facility] OR       Family History:  Maternal grandfather: Thrombocytopenia  Paternal grandfather: Prostate cancer, type 2 diabetes.     Social History: His mother (Mervat) temporarily relocated from [place] to be his full-time caregiver. Lodging through [facility] [employer]. His father (Dangelo), stepmother (Camryn) and paternal grandparents are all involved in his care. Isael has authorized communication with all of them.    Discharge Medications: See MAR    Allergies      Allergies   Allergen Reactions     Blood Transfusion Related (Informational Only) Other (See Comments)     Patient has a history of a clinically significant antibody against RBC antigens.  A delay in compatible RBCs may occur.Stem cell transplant patient.  Give type O RBCs.  Requires Benadryl as premed for platelets for history of hives     Voriconazole Photosensitivity     Significant rash - do not rechallenge       Discharge Physical Exam   GEN: Laying in bed, interactive, answers questions appropriately. Mental status intact.  Flat affect.  Mother at bedside.    HEENT: Full head of hair, normocephalic, sclerae clear, nares patent, no rhinorrhea, no noted cough. Mucous membranes moist, no oral lesions appreciated, oropharynx clear.   CARD: Normal rhythm with borderline bradycardia, normal S1/S2 without murmur. Cap refill < 2 sec.   RESP: Good air movement noted, no wheezing or crackles on auscultation.  CTAB.    ABD: Soft, non tender, nondistended. No organomegaly appreciated.   EXTREM: warm and well perfused, pulses 2+ throughout.    SKIN: Pale throughout, no bruising or petechia noted.    ACCESS: CVC double lumen (L) and single port (R)    Hospital Course:     Artemio Nino is a  20 year old male with very high risk B-cell ALL + JAK2 activation, now one year and eight months status post  matched sibling donor BMT. 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 CD19 positive (dim).  Isael received bridging chemotherapy with oral 6MP, methotrexate and ruxolitinib.  Outpatient lymphodepleting chemotherapy per [id number] with cytoxan and fludarabine 7/21-7/24 completed as an outpatient.  He was admitted 7/24 with a fever.  Empiric antibiotics were initiated and were negative.  He was initiated on empiric cefepime.  He received his Kymriah Car-T infusion on 7/27/20.  Due to his fevers, he immediately met criteria for cytokine release syndrome (CRS).  His CRS was initially grade 2.  He received broadened antibiotics 7/27.  On 8/6, Isael developed grade 3 CRS with hypotension.  He required an NS bolus and initiation of dopamine, and administration of tocilizumab.  He received additional doses of tocilizumab on 8/7 and 8/8.  On 8/6, Isael developed headache and vomiting which progressed over 48 hours and was not helped with IV opiates.  He was diagnosed with Car-T neurotoxicity and was initiated on methylprednisolone 8/8 and continued at Q12H dosing through 8/11.  His headaches and vomiting resolved, so steroids were slowly weaned.  He was discharged on prednisone 20 mg daily.  Further weans will take place outpatient.  His last fever was 100.6F on 8/9.  His morphine was weaned as tolerated and stopped 8/13 without any residual discomfort.  Given he was afebrile, clinically stable, and tolerating oral medications, the decision was made to discharge Isael with close outpatient follow-up.  See Car-T note below for further information regarding organ toxicity.    Daily Progress Note      Assessment and Plan Artemio Nino is a 20 year old male with very high risk   B-cell ALL + JAK2 activation, now one year and eight months 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 CD19 positive (dim).      Day +18.  Isael completed lymphodepleting chemotherapy  prep as an outpatient. He was admitted for fever on 7/24, blood cultures no growth to date.     Continues with Grade 0 CRS no without fevers, off vasopressors.   Continuing to monitor closely.  Monitoring coags, LFTs, and lytes daily.  Weaning off morphine gtt given lack of needs and pain well controlled.  Transitioned to PO levaquin from IV cefepime for empiric coverage for febrile neutropenia.        ALL and BMT: High risk B cell ALL (CNS negative) + JAK2 activation.  - Related bone marrow transplant 11/2/18  - Relapsed 6/5/2020, 19 months status post matched related BMT per [id number] (cytoxan/TBI arm) with CD19 (dim) relapsed b-cell lymphoblastic leukemia.  - Completed apheresis collection in the [facility] (6/2020).   - He has been receiving bridging chemotherapy with oral 6MP, methotrexate and ruxolitinib. All oral chemotherapy stopped on 7/12.   - BM revealed 75% involvement with leukemia. CSF was negative.   - Outpatient lymphodepleting chemotherapy per [id number] with cytoxan and fludarabine 7/21-7/24 completed as an outpatient     [facility] infusion 7/27   - Pre-meds of Tylenol and Benadryl   - Flush completed.      BMT Daily CARTOX Note (complete days 0-14 and with any subsequent CRS/neurotoxicity)     Date: August 14, 2020     Artemio Nino ([id number]) is a 20 year old year old male who received infusion on 7/27, currently day +18 of CAR-T cell therapy Kymriah     Vital Signs: /57   Pulse 85   Temp 99.5  F (37.5  C) (Axillary)   Resp 20   Wt 59.7 kg (131 lb 9.8 oz)   SpO2 99%   BMI 19.38 kg/m       1) CRS Grading (based ONLY on parameters in box below)     Fever:             < 100.4     Blood pressure:             SBP >/= 90 (not hypotensive)     Oxygen saturation:              >/= 90% on room air (not hypoxic)     CRS Parameter Grade 1  Grade 2 Grade 3 Grade 4   Fever* Tm >= 100.4 degrees F Tm >= 100.4 degrees F Tm >= 100.4 degrees F Tm >= to 100.4  degrees F       With  Either:  Hypotension (SBP <90) None Responsive to Fluids  Requiring 1  vasopressor (w/ or w/o vasopressin) Requiring multiple  vasopressors  (excluding  vasopressin)      And/or  Hypoxia (O2 sats <90% on room air) None Low-flow nasal  cannula or blow-by High-flow nasal  cannula, face mask,  non-rebreather mask,or Venturi mask  Requiring positive  pressure (CPAP,  BiPAP intubation and  mechanical  ventilation)      CRS Summary  Does patient have CRS? Yes, date of onset: 7/28/20, initial grade 2 with max grade 3  Current CRS stgstrstastdstest:st st1st What therapy was given:   - 7/28: IV Antibiotics  - 8/6: One 500 mL fluid bolus, dopamine 2 mcg/kg/min, increased dopamine to 7 mcg/kg/min. Gave 1 dose of Tociluzimab. IV fluids 100 mL/hr.  -8/7: 2nd dose of Tociluzimab  -8/8: 3rd dose of Tociluzimab and initiated Methylprednisolone  8/9-10 Continue methylprednisolone Q12H  - 8/11: wean methylpred to 0.5 mg/kg Q12h   - 8/12: vasopressors off, afebrile, intermittent bradycardia  Has CRS grade changed?  Yes, 8/6: increased from grade 2 to grade 3, he is currently grade 1  Has CRS resolved?  Yes     2) Neurotoxicity:  Isael continues to report neck pain with associated headache (first noted on 8/6 with significant worsening over 48 hours and now improving) and vomiting. He has full range of motion in his neck, no nuchal rigidity and denies sensitivity to light.  Morphine and Tylenol have been ineffective at controlling neck pain/headache. Head CT performed 8/7 showing no intracranial pathology.  The neck pain completely resolved on 8/12 and at time of discharge was not present.       Isael does not appear to have any symptoms of encephalopathy, such as: altered mental status, confusion, acting out of character, nor has he had trouble swallowing or speaking, nor involuntary eye or muscle movements.      ICE Assessment  Orientation to year, month, city, hospital: 4 points, Name 3 objects (e.g., point to clock, pen, button): 3 point, Following  commands: (e.g., Show me 2 fingers): 1 point, Write a standard sentence (e.g., The pickett jumped over the log): 1 point and Count backwards from 100 by ten: 1 point  Total points: 10: No impairment     ASTCT ICANS Consensus Grading for Adults  ICE Score             10     Seizure             No seizures     Motor Findings             No motor findings     Elevated ICP/Cerebral Edema NA        Neurotoxicity  Domain Grade 1 Grade 2 Grade 3 Grade 4   ICE score 7-9 3-6 1-2 0   Depressed LOC      Awakens  spontaneously Awakens to  voice  Awakens only to  tactile stimulation Unarousable or  requires vigorous  or repetitive  tactile stimuli to  arouse. Stupor  or coma.   Seizure n/a n/a Any clinical  seizure focal or  generalized that  resolves rapidly  or nonconvulsive  seizures on EEG  that resolve with  intervention  Life-threatening  prolonged  seizure (>5 min);  or Repetitive  clinical or  electrical  seizures without  return to baseline  in between    Motor Findings      n/a n/a n/a Deep focal motor  weakness such  as hemiparesis  or paraparesis   Elevated  ICP/cerebral  edema  n/a n/a Focal/local  edema on  neuroimaging  Diffuse cerebral  edema on  neuroimaging;  decerebrate or  decorticate  posturing; or  cranial nerve VI  palsy; or  papilledema; or  Cushing's triad      ICANS grade is determined by the most severe event (ICE score, level of consciousness, seizure, motor findings, raised ICP/cerebral edema) not attributable to any other cause; for example, a patient with an ICE score of 3 who has a generalized seizure is classified as grade 3 ICANS.    A patient with an ICE score of 0 may be classified as grade 3 ICANS if awake with global aphasia, but a patient with an ICE score of 0 may be classified as grade 4 ICANS if unarousable.     Depressed level of consciousness should be attributable to no other cause (eg, no sedating medication)    Tremors and myoclonus associated with immune effector cell therapies may be  graded according to CTCAE v5.0,but they do not influence ICANS grading.     Intracranial hemorrhage with or without associated edema is not considered a neurotoxicity feature and is  excluded from ICANS grading. It may be graded according to CTCAE v5.0.          Neurotoxicity Summary  Does patient have neurotoxicity? Yes, now resolved  Current Neurotoxicity score (0-10): 10  What therapy was given: Yes, methylprednisolone transitioned to prednisone on 8/12  Has neurotoxicity grade changed? no  Has neurotoxicity resolved? no     3) Organ CAR-T toxicity:     Cardiac             none     Respiratory             Cough (8/6-8/8). No associated increased work of breathing, hypoxia or need for supplemental oxygen.  Now resolved.      Gastrointestinal             Nausea/emesis, resolved.       Hepatic              none     Skin             none      Coagulopathy              Last checked 8/13: INR 1.24, PTT 30, fibrinogen 95.     Other:      4) HLH   Ferritin > 10,000 plus any TWO of the following: NA      * CTCAE grading can be found at   https://ctep.cancer.gov/protocoldevelopment/electronic_applications/docs/CTCAE_v5_Quick Reference_8.5x11.pdf     FEN/Renal:   # Risk for malnutrition: Is drinking quite a bit per his report, not eating.  - Continue nutritional support with TPN/SMOF lipids    - Monitor nutritional intake     # Risk for electrolyte abnormalities:  - Check daily electrolytes     # Risk for renal dysfunction/risk for ELIZ secondary to potential tumor lysis with preparatory chemotherapy: GFR 133ml/min  - Daily electrolyte monitoring  - Monitor daily weights     # Risk for TLS:   Normal uric acid with low phosphorous throughout hospitalization.  Restart allopurinol if uric acid > 2.5.  At time of discharge, uric acid 2.2 on 8/13.     Pulmonary:  # Risk for pulmonary insufficiency: No current concerns. Normal chest and sinus CTs during work-up.      Cardiovascular:  # Hypotension, resolved  #Asymptomatic  bradycardia  Continued on Dopamine, titrating as indicated. Currently at 4 mcg/kg/minute with plans to wean off today as tolerated.  Titrated off dopamine on 8/11.   Subsequent day experienced asymptomatic bradycardia; confirmed sinus bradycardia on EKG.  At time of discharge, HDS.      # Aaron-Parkinson-White Syndrome: diagnosed upon syncope in 02/2018. Asymptomatic, no interventions to date. No current concerns.  - EKG 7/15 revealed WPW with normal sinus rhythm.   - Echocardiogram 7/15 showed LVEF of 62%  - EKG 8/12 showed resolution of WPW     Heme:   # Pancytopenia secondary to chemotherapy:  - Transfuse for hemoglobin < 7, platelets < 20,000, platelet parameter increased 8/6 due to mild epistaxis & hematemesis with streaks of blood. No reports in the past 24 hours.    -  Mild hives noted at end of pRBC transfusion on 7/25. Premedicate with Tylenol and Benadryl for pRBC transfusions.   - Recent history of significant hives with platelet transfusions. Was being premedicated with hydrocortisone, tylenol, and benadryl for platelets. However, no hydrocortisone premedication ordered now given CAR-T.   - Isael reported he had a few hives on his leg with recent platelet transfusion , non pruritic, self resolved, did not report to nursing.  - No GCSF per protocol     #Coagulopathy related to CRS  No concern for DIC currently.  No active signs of bleeding.   He received numerous doses of cyro (8/9-11).  We trended daily fibrinogen, PTT, INR as well as D-dimer and ferritin levels twice weekly.  Repeat ferritin level on 8/12 elevated in ~70k.  Coagulopathy labs improving at time of discharge.     Infectious Disease:  # Recent Febrile Neutropenia   Admitted for fever. Spiking fevers, thought related to CRS, although fever curve improved in the past 24 hours.  S/p vancomycin.  Blood cultures have remained negative or NGTD.   Covid negative 7/17 and 7/24.  He has remained HDS and transitioned off cefepime on 8/12 to PO  levaquin.  Plan to continue PO levaquin while neutropenic.                                                                         - Viral prophylaxis: HD Valtrex continue through Day +30  - Fungal prophylaxis: Micafungin was discontinued at discharge.  Plan to start fluconazole with continued improvement of liver function. History of photoxic drug eruption with voriconazole, avoid use.   - Bacterial prophylaxis: Cefepime as noted above.  - PCP prophylaxis: Bactrim to begin day +28      GI:   # Nausea, stable after starting scheduled zofran.  Intermittent nausea throughout the day ongoing.    - Zofran q6H     # Acid reflux:  Protonix BID while on steroids with plan to wean to daily at discontinuation.  Continue TUMS prn.     # Recent Transaminitis:  Possibly related to recent chemotherapy.  Continue to monitor.       Oral Hygiene:   # Receding gums, risk for infection  - Continue chlorhexidine mouth wash for symptomatic control, pain now improved     Derm:  No current rash. He is no longer using creams.     Fertility:  Sperm collected pre chemotherapy for fertility preservation    Discharge Considerations: Expected lengths of hospitalization for patients with complications from stem cell transplant vary based on the complication(s) and severity(ies). A typical stay is 7-14 days.    Pending Labwork: None  Upcoming Infusion Appointments: None  PT/OT/ST Outpatient Recommendations: None  Primary BMT MD & RN Coordinator: MD Daria Carvalho RN     The above plan of care was developed by and communicated to me by the Pediatric BMT attending physician, Dr. Viky Zavala.    Candido Morales DO  [facility] BMT Hospitalist    Pediatric BMT Inpatient Attending Note:     Artemio was seen and evaluated by me today. He is ready to be discharge      The significant interval history includes: Isael continues to report improvement in neck pain and headache. He denies any complaints today.   He is improving.  He will  be seen on Monday. His CRS continues to improve as does his neurotoxicity.     I have reviewed the vital signs, medications and lab results.  I assisted in formulating a plan, which was instituted by the BMT team.   The decision was made that a discharge today would be appropriate.  The necessary coordination was done, and >30 minutes of time was required to accomplish this.  Follow-up has been arranged for the family as deemed appropriate.       Viky Zavala MD, PhD    Pediatric Blood and Marrow Transplant  [facility]

## 2020-07-24 NOTE — PROGRESS NOTES
CLINICAL NUTRITION SERVICES - PEDIATRIC ASSESSMENT NOTE     REASON FOR ASSESSMENT  Artemio Nino is a 20 year old male seen by the dietitian for consult- PN.       ANTHROPOMETRICS  Height/Length: 175.5 cm  Weight: 58.9 kg  BMI: 19.1 kg/m2- BMI indicates underweight status  Dosing weight: 58.4 kg  Comments: Weight in October of 2018 was 65.1 kg- This is a 10% weight loss.     NUTRITION HISTORY  Patient is on a Regular diet at home and was started on PN Monday night.  Typical food/fluid intake is decreased due to nausea and mucositis  Information obtained from chart  Factors affecting nutrition intake include: mucositis, nausea, decreased appetite and medical course     CURRENT NUTRITION ORDERS  Diet:Regular     CURRENT NUTRITION SUPPORT   Parenteral Nutrition:  Type of Parenteral Access: Central  PN frequency: Cycled, 12 hour     PN of 1200 mLs, GIR of 3.5 mg/kg/min (150 g dextrose), 87.6 g protein, 1.5 g/kg protein and 250 mL lipids to provide 1360 kcals (23 kcal/kg).  PN is meeting 77% of kcal needs and 100% of protein needs.     NEW FINDINGS  Admitted with fever     LABS  Labs reviewed     MEDICATIONS  Medications reviewed     ASSESSED NUTRITION NEEDS:  Estimated Energy Needs: 30-35 kcal/kg  Estimated Protein Needs: 1.5 g/kg  Estimated Fluid Needs: 1 mL/kcal     NUTRITION DIAGNOSIS:  Inadequate oral intake related to nausea, mucositis, and pain as evidence by decreased po, wt loss and reliance on PN to meet needs.     INTERVENTIONS  Nutrition Prescription  Po/nutrition support to meet needs for weight maintenance    Nutrition Education:   None provided; continuing with home PN.     Implementation:  Parenteral nutrition- continue home PN.    Goals  1. Po and/or nutrition support to meet greater than 75% of needs  2. Weight maintenance     FOLLOW UP/MONITORING  Food and Beverage intake- monitor po, Enteral and parenteral nutrition intake- adjust as needed and Anthropometric measurements- monitor  wt     RECOMMENDATIONS  If no po and if continuous PN is desired for better fluid management, increase dextrose to 265 g to provide a GIR of 3.2 and to meet 100% of kcal needs.     Renetta Schwab, RD, LD, [facility]  [phone]

## 2020-07-25 LAB
ABO + RH BLD: NORMAL
ABO + RH BLD: NORMAL
ALBUMIN SERPL-MCNC: 3.2 G/DL (ref 3.4–5)
ALP SERPL-CCNC: 67 U/L (ref 40–150)
ALT SERPL W P-5'-P-CCNC: 151 U/L (ref 0–70)
ANION GAP SERPL CALCULATED.3IONS-SCNC: 3 MMOL/L (ref 3–14)
AST SERPL W P-5'-P-CCNC: 32 U/L (ref 0–45)
BILIRUB SERPL-MCNC: 0.8 MG/DL (ref 0.2–1.3)
BLD GP AB SCN SERPL QL: NORMAL
BLD PROD TYP BPU: NORMAL
BLD UNIT ID BPU: 0
BLD UNIT ID BPU: 0
BLOOD BANK CMNT PATIENT-IMP: NORMAL
BLOOD PRODUCT CODE: NORMAL
BLOOD PRODUCT CODE: NORMAL
BPU ID: NORMAL
BPU ID: NORMAL
BUN SERPL-MCNC: 22 MG/DL (ref 7–30)
CALCIUM SERPL-MCNC: 8.4 MG/DL (ref 8.5–10.1)
CHLORIDE SERPL-SCNC: 109 MMOL/L (ref 94–109)
CO2 SERPL-SCNC: 27 MMOL/L (ref 20–32)
CREAT SERPL-MCNC: 0.65 MG/DL (ref 0.66–1.25)
DIFFERENTIAL METHOD BLD: ABNORMAL
ERYTHROCYTE [DISTWIDTH] IN BLOOD BY AUTOMATED COUNT: 14.5 % (ref 10–15)
GFR SERPL CREATININE-BSD FRML MDRD: >90 ML/MIN/{1.73_M2}
GLUCOSE SERPL-MCNC: 107 MG/DL (ref 70–99)
HCT VFR BLD AUTO: 18.9 % (ref 40–53)
HGB BLD-MCNC: 6.4 G/DL (ref 13.3–17.7)
LDH SERPL L TO P-CCNC: 114 U/L (ref 85–227)
MAGNESIUM SERPL-MCNC: 2.3 MG/DL (ref 1.6–2.3)
MCH RBC QN AUTO: 27.9 PG (ref 26.5–33)
MCHC RBC AUTO-ENTMCNC: 33.9 G/DL (ref 31.5–36.5)
MCV RBC AUTO: 83 FL (ref 78–100)
NUM BPU REQUESTED: 2
PHOSPHATE SERPL-MCNC: 3.8 MG/DL (ref 2.5–4.5)
PLATELET # BLD AUTO: 36 10E9/L (ref 150–450)
POTASSIUM SERPL-SCNC: 3.6 MMOL/L (ref 3.4–5.3)
PROT SERPL-MCNC: 6.5 G/DL (ref 6.8–8.8)
RBC # BLD AUTO: 2.29 10E12/L (ref 4.4–5.9)
SODIUM SERPL-SCNC: 139 MMOL/L (ref 133–144)
SPECIMEN EXP DATE BLD: NORMAL
TRANSFUSION STATUS PATIENT QL: NORMAL
URATE SERPL-MCNC: 2.3 MG/DL (ref 3.5–7.2)
WBC # BLD AUTO: 0.2 10E9/L (ref 4–11)

## 2020-07-25 PROCEDURE — 20600000 ZZH R&B BMT

## 2020-07-25 PROCEDURE — 25000128 H RX IP 250 OP 636: Performed by: PEDIATRICS

## 2020-07-25 PROCEDURE — 84550 ASSAY OF BLOOD/URIC ACID: CPT | Performed by: STUDENT IN AN ORGANIZED HEALTH CARE EDUCATION/TRAINING PROGRAM

## 2020-07-25 PROCEDURE — 80053 COMPREHEN METABOLIC PANEL: CPT | Performed by: STUDENT IN AN ORGANIZED HEALTH CARE EDUCATION/TRAINING PROGRAM

## 2020-07-25 PROCEDURE — 25000128 H RX IP 250 OP 636: Performed by: STUDENT IN AN ORGANIZED HEALTH CARE EDUCATION/TRAINING PROGRAM

## 2020-07-25 PROCEDURE — 40000341 ZZHCL STATISTIC BB TRANSF RXN INVEST: Performed by: STUDENT IN AN ORGANIZED HEALTH CARE EDUCATION/TRAINING PROGRAM

## 2020-07-25 PROCEDURE — 87040 BLOOD CULTURE FOR BACTERIA: CPT | Performed by: STUDENT IN AN ORGANIZED HEALTH CARE EDUCATION/TRAINING PROGRAM

## 2020-07-25 PROCEDURE — 25800030 ZZH RX IP 258 OP 636: Performed by: PEDIATRICS

## 2020-07-25 PROCEDURE — 85027 COMPLETE CBC AUTOMATED: CPT

## 2020-07-25 PROCEDURE — 25000125 ZZHC RX 250: Performed by: PEDIATRICS

## 2020-07-25 PROCEDURE — 83615 LACTATE (LD) (LDH) ENZYME: CPT | Performed by: STUDENT IN AN ORGANIZED HEALTH CARE EDUCATION/TRAINING PROGRAM

## 2020-07-25 PROCEDURE — 25000132 ZZH RX MED GY IP 250 OP 250 PS 637: Performed by: STUDENT IN AN ORGANIZED HEALTH CARE EDUCATION/TRAINING PROGRAM

## 2020-07-25 PROCEDURE — 83735 ASSAY OF MAGNESIUM: CPT | Performed by: STUDENT IN AN ORGANIZED HEALTH CARE EDUCATION/TRAINING PROGRAM

## 2020-07-25 PROCEDURE — 87103 BLOOD FUNGUS CULTURE: CPT | Performed by: STUDENT IN AN ORGANIZED HEALTH CARE EDUCATION/TRAINING PROGRAM

## 2020-07-25 PROCEDURE — P9040 RBC LEUKOREDUCED IRRADIATED: HCPCS | Performed by: STUDENT IN AN ORGANIZED HEALTH CARE EDUCATION/TRAINING PROGRAM

## 2020-07-25 PROCEDURE — 84100 ASSAY OF PHOSPHORUS: CPT | Performed by: STUDENT IN AN ORGANIZED HEALTH CARE EDUCATION/TRAINING PROGRAM

## 2020-07-25 RX ORDER — DIPHENHYDRAMINE HYDROCHLORIDE 50 MG/ML
50 INJECTION INTRAMUSCULAR; INTRAVENOUS EVERY 6 HOURS PRN
Status: DISCONTINUED | OUTPATIENT
Start: 2020-07-25 | End: 2020-08-14 | Stop reason: HOSPADM

## 2020-07-25 RX ORDER — ACETAMINOPHEN 325 MG/1
650 TABLET ORAL EVERY 6 HOURS PRN
Status: DISCONTINUED | OUTPATIENT
Start: 2020-07-25 | End: 2020-08-06

## 2020-07-25 RX ADMIN — Medication 150 MG: at 07:49

## 2020-07-25 RX ADMIN — Medication 150 MG: at 19:16

## 2020-07-25 RX ADMIN — CEFEPIME 2000 MG: 2 INJECTION, POWDER, FOR SOLUTION INTRAVENOUS at 15:16

## 2020-07-25 RX ADMIN — VALACYCLOVIR HYDROCHLORIDE 1000 MG: 500 TABLET, FILM COATED ORAL at 07:49

## 2020-07-25 RX ADMIN — VALACYCLOVIR HYDROCHLORIDE 1000 MG: 500 TABLET, FILM COATED ORAL at 13:52

## 2020-07-25 RX ADMIN — Medication 150 MG: at 13:52

## 2020-07-25 RX ADMIN — CHLORHEXIDINE GLUCONATE 0.12% ORAL RINSE 15 ML: 1.2 LIQUID ORAL at 07:49

## 2020-07-25 RX ADMIN — DIPHENHYDRAMINE HYDROCHLORIDE 50 MG: 50 INJECTION, SOLUTION INTRAMUSCULAR; INTRAVENOUS at 08:33

## 2020-07-25 RX ADMIN — CALCIUM GLUCONATE: 98 INJECTION, SOLUTION INTRAVENOUS at 19:16

## 2020-07-25 RX ADMIN — GRANISETRON HYDROCHLORIDE 1 MG: 1 TABLET ORAL at 19:16

## 2020-07-25 RX ADMIN — PANTOPRAZOLE SODIUM 40 MG: 20 TABLET, DELAYED RELEASE ORAL at 07:49

## 2020-07-25 RX ADMIN — HEPARIN, PORCINE (PF) 10 UNIT/ML INTRAVENOUS SYRINGE 5 ML: at 07:43

## 2020-07-25 RX ADMIN — CEFEPIME 2000 MG: 2 INJECTION, POWDER, FOR SOLUTION INTRAVENOUS at 07:43

## 2020-07-25 RX ADMIN — ACETAMINOPHEN 650 MG: 325 TABLET, FILM COATED ORAL at 00:26

## 2020-07-25 RX ADMIN — VALACYCLOVIR HYDROCHLORIDE 1000 MG: 500 TABLET, FILM COATED ORAL at 19:16

## 2020-07-25 RX ADMIN — I.V. FAT EMULSION 250 ML: 20 EMULSION INTRAVENOUS at 19:16

## 2020-07-25 RX ADMIN — MICAFUNGIN SODIUM 50 MG: 10 INJECTION, POWDER, LYOPHILIZED, FOR SOLUTION INTRAVENOUS at 18:35

## 2020-07-25 ASSESSMENT — ACTIVITIES OF DAILY LIVING (ADL)
RETIRED_EATING: 0-->INDEPENDENT
RETIRED_COMMUNICATION: 0-->UNDERSTANDS/COMMUNICATES WITHOUT DIFFICULTY
TOILETING: 0-->INDEPENDENT
SWALLOWING: 0-->SWALLOWS FOODS/LIQUIDS WITHOUT DIFFICULTY
FALL_HISTORY_WITHIN_LAST_SIX_MONTHS: NO
BATHING: 0-->INDEPENDENT
AMBULATION: 0-->INDEPENDENT
DRESS: 0-->INDEPENDENT
COGNITION: 0 - NO COGNITION ISSUES REPORTED
TRANSFERRING: 0-->INDEPENDENT

## 2020-07-25 NOTE — PROGRESS NOTES
Artemio has been febrile (Tmax 100.6, most recently 99.3). Tylenol PRN given x1 and scheduled Cefepime given. Cultures obtained and sent. OVSS. Lungs clear on RA. RBCs given (2 units). No N/V. Reports 2/10 pain in throat. Good UOP. No stool. Hourly rounding completed. Continue with POC.

## 2020-07-25 NOTE — PROGRESS NOTES
Tmax 100, cultured for the day prior to this shift, OVSS, no pain or nausea, relaxing in bed- eating and drinking. No issues or complaints. Continue with POC.

## 2020-07-25 NOTE — PROGRESS NOTES
July 24, 2020    Dear Dr. Paniagua,    We had the pleasure of seeing Artemio Nino in our Pediatric Blood and Marrow Transplant Clinic today. 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    Isael was seen on the infusion side.  During his fludarabine, he started to have an increase in his temperature to 99.8.  Initially he was scheduled to receive platelets, but due to the elevated temperature, we held off as he requires tylenol and benadryl prior.  In discussion with Isael, he said he has felt hot and felt hot last night.  About a week ago he was concerned about diarrhea and extreme abdominal pain, but that his since subsided and no additional bouts.  He has no cough, runny nose, problems voiding or stooling.  He has tolerated his chemotherapy relatively well.       Review of Systems: Pertinent positives include those mentioned in interval events. A complete review of systems was performed and is otherwise negative.       Family History: Unchanged from previous visit    Social History: Mom is here now from [place] and they are in the apartment that is being paid for by [facility].      Medications:  Reviewed.     Physical Exam:  Vitals reviewed, temp 99.8.  Karnofsky is 100  GEN: Laying on exam table. Awake and alert and in no apparent distress. Pale.  HEENT full head of hair.    LYMPH: no adenopathy  CARD: Regular rate and rhythm. Heart sounds dual, no murmurs, rubs or gallops. Cap refill <2 seconds.  RESP: Clear on auscultation, good bilateral air entry. No increased work of breathing, crackles or wheezes.   ABD: Non-distended, non-tender, no organomegaly  EXTREM: WWP     Labs:    Results for orders placed or performed during the hospital encounter of 07/24/20   CBC with platelets differential     Status: Abnormal   Result Value Ref Range    WBC 0.2  (LL) 4.0 - 11.0 10e9/L    RBC Count 2.83 (L) 4.4 - 5.9 10e12/L    Hemoglobin 7.7 (L) 13.3 - 17.7 g/dL    Hematocrit 22.1 (L) 40.0 - 53.0 %    MCV 78 78 - 100 fl    MCH 27.2 26.5 - 33.0 pg    MCHC 34.8 31.5 - 36.5 g/dL    RDW 14.2 10.0 - 15.0 %    Platelet Count 10 (LL) 150 - 450 10e9/L    Diff Method WBC <0.5, Diff not done    Symptomatic COVID-19 Virus (Coronavirus) by PCR     Status: None    Specimen: Nasopharyngeal   Result Value Ref Range    COVID-19 Virus PCR to [facility] - Source Nasopharyngeal     COVID-19 Virus PCR to [facility] - Result       Test received-See reflex to IDDL test SARS CoV2 (COVID-19) Virus RT-PCR   SARS-CoV-2 COVID-19 Virus (Coronavirus) RT-PCR Nasopharyngeal     Status: None    Specimen: Nasopharyngeal   Result Value Ref Range    SARS-CoV-2 Virus Specimen Source Nasopharyngeal     SARS-CoV-2 PCR Result NEGATIVE     SARS-CoV-2 PCR Comment       Testing was performed using the Xpert Xpress SARS-CoV-2 Assay on the Cepheid Gene-Xpert   Instrument Systems. Additional information about this Emergency Use Authorization (EUA)   assay can be found via the Lab Guide.     ABO/RH Type and Screen      Status: None   Result Value Ref Range    ABO A     RH(D) Neg     Antibody Screen Neg     Test Valid Only At          [facility]    Specimen Expires 07/27/2020    Platelets prepare order unit conditional     Status: None   Result Value Ref Range    Blood Component Type PLT Pheresis     Units Ordered 1    Blood component     Status: None   Result Value Ref Range    Unit Number [id number]     Blood Component Type PlateletPheresis,LeukoRed Irrad (Part 2)     Division Number 00     Status of Unit Released to care unit 07/24/2020 1710     Blood Product Code [id number]     Unit Status ISS    Blood culture yeast     Status: None (Preliminary result)    Specimen: Blood    Red port   Result Value Ref Range    Specimen Description Blood Red port     Special Requests Received  in aerobic bottle only     Culture Micro No growth after 2 hours    Blood culture     Status: None (Preliminary result)    Specimen: Blood    Red port   Result Value Ref Range    Specimen Description Blood Red port     Special Requests Received in aerobic bottle only     Culture Micro No growth after 2 hours    Blood culture     Status: None (Preliminary result)    Specimen: Blood    PURPLE   Result Value Ref Range    Specimen Description Blood PURPLE     Special Requests Received in aerobic bottle only     Culture Micro No growth after 2 hours    Blood culture yeast     Status: None (Preliminary result)    Specimen: Blood    PURPLE   Result Value Ref Range    Specimen Description Blood PURPLE     Special Requests Received in aerobic bottle only     Culture Micro No growth after 2 hours    Results for orders placed or performed in visit on 07/24/20   CBC with platelets differential     Status: Abnormal   Result Value Ref Range    WBC 0.3 (LL) 4.0 - 11.0 10e9/L    RBC Count 2.91 (L) 4.4 - 5.9 10e12/L    Hemoglobin 8.0 (L) 13.3 - 17.7 g/dL    Hematocrit 23.8 (L) 40.0 - 53.0 %    MCV 82 78 - 100 fl    MCH 27.5 26.5 - 33.0 pg    MCHC 33.6 31.5 - 36.5 g/dL    RDW 14.5 10.0 - 15.0 %    Platelet Count 14 (LL) 150 - 450 10e9/L    Diff Method WBC <0.5, Diff not done    Comprehensive metabolic panel     Status: Abnormal   Result Value Ref Range    Sodium 136 133 - 144 mmol/L    Potassium 4.0 3.4 - 5.3 mmol/L    Chloride 106 94 - 109 mmol/L    Carbon Dioxide 25 20 - 32 mmol/L    Anion Gap 5 3 - 14 mmol/L    Glucose 137 (H) 70 - 99 mg/dL    Urea Nitrogen 21 7 - 30 mg/dL    Creatinine 0.65 (L) 0.66 - 1.25 mg/dL    GFR Estimate >90 >60 mL/min/[1.73_m2]    GFR Estimate If Black >90 >60 mL/min/[1.73_m2]    Calcium 8.5 8.5 - 10.1 mg/dL    Bilirubin Total 0.8 0.2 - 1.3 mg/dL    Albumin 3.5 3.4 - 5.0 g/dL    Protein Total 7.0 6.8 - 8.8 g/dL    Alkaline Phosphatase 82 40 - 150 U/L     (H) 0 - 70 U/L    AST 58 (H) 0 - 45 U/L    Magnesium     Status: None   Result Value Ref Range    Magnesium 1.9 1.6 - 2.3 mg/dL   Phosphorus     Status: None   Result Value Ref Range    Phosphorus 4.3 2.5 - 4.5 mg/dL   Uric acid     Status: Abnormal   Result Value Ref Range    Uric Acid 1.9 (L) 3.5 - 7.2 mg/dL   Platelets prepare order unit     Status: None   Result Value Ref Range    Blood Component Type PLT Pheresis     Units Ordered 1           Assessment/Plan:  Artemio is a 20-year old male with 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     ALL and BMT: High risk B cell ALL (CNS negative) + JAK2 activation / BMT: bone marrow biopsy prior to transplant (10/15) relapse 1.5 years.  Getting fludarabine today.      GVHD: No evidence of GVHD.    FEN/Renal:  No issues.    GI:  Previous history of reflux and heartburn. Now no longer an issue and not on any therapy currently.    ID:Continue current meds. Concern about fever.  Gave anticipatory guidance for fever.  We watched him for 2 hours and then sent him home  Of note he then called for a fever around 3:30 to 101.5 and was admitted to the hospital.     Pulmonary:  No issues.       Cardiovascular: Aaron-Parkinson-White Syndrome: diagnosed upon syncope in 02/2018. Asymptomatic, no interventions to date. Work up EKG c/w WPW, sinus bradycardia at 49 bpm. Work up ECHO normal with EF 68%.  No current concerns     Isael was admitted to the hospital later this afternoon for blood cultures and IV antibiotics.  He is slated to receive K[facility] on Monday.      Sincerely,       Viky Zavala MD, PhD    Pediatric Blood and Marrow Transplant  [facility]

## 2020-07-25 NOTE — PLAN OF CARE
Tmax 99.8 to 99.9, other VSS, no complains of pain. Patient had a reaction to blood this morning, had hives on his face, blood had four minutes left to finish when it happened, MD was notified, blood was stopped and sent to blood bank. X 1 IV benadryl given, no other issues this morning continue to plan of care.

## 2020-07-25 NOTE — PLAN OF CARE
tmax 103.1 Cx drawn cefepime started. OVSS. BP's slightly low. Platelets given. No complaints of pain or nausea. Benadryl given as premed. Mom at bedside. Hourly rounding completed. Continue POC

## 2020-07-25 NOTE — PROGRESS NOTES
Pediatric BMT Daily Progress Note    Interval Events: Isael was admitted yesterday for fever. He has remained hemodynamically stable with blood cultures remaining no growth to date. Received pRBC overnight for hemoglobin 6.4, he developed mild hives at the very end of the second unit of pRBC. Continues to complain of sore throat, otherwise no complaints on morning rounds.     Review of Systems: Pertinent positives include those mentioned in interval events. A complete review of systems was performed and is otherwise negative.      Medications:  Please see MAR    Physical Exam:  Temp:  [98.8  F (37.1  C)-103.1  F (39.5  C)] 99.9  F (37.7  C)  Pulse:  [] 74  Resp:  [16-18] 16  BP: ()/(50-73) 96/61  SpO2:  [98 %-100 %] 98 %  I/O last 3 completed shifts:  In: 1750 [I.V.:900]  Out: 1975 [Urine:1975]    GEN: Lying in bed, no acute distress, appears pale and fatigued. Mother present  HEENT: Full head of hair, normocephalic, sclerae clear, PERRL, nares patent, OP with mild erythema, no discreet lesions or exudate visualized. No cervical lymphadenopathy appreciated.   CARD: RRR, normal S1/S2 without murmur. Cap refill < 2 sec  RESP: Lungs CTA bilaterally without adventitious lung sounds  ABD: Soft NT, ND, no organomegaly.  EXTREM: warm and well perfused.   SKIN: Pale throughout, clear without erythema, rash or lesions.  ACCESS: CVC and port    Labs:  Labs reviewed, pertinent findings WBC 0.2, Hb 6.4, platelets 36,000. BUN 22, creat 0.65.    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 CD19 positive (dim).      Isael completed non-myleoablative chemotherapy prep as an outpatient, but was admitted for fever on 7/24.  He was previously scheduled for admission on 7/26 prior to [facility] on 7/27. Continues to  complain of throat pain.      ALL and BMT: High risk B cell ALL (CNS negative) + JAK2 activation.  - Related bone marrow transplant 11/2/18  - Relapsed 6/5/2020, 19 months status post matched related BMT per [id number] (cytoxan/TBI arm) with CD19 (dim) relapsed b-cell lymphoblastic leukemia.  - Completed apheresis collection in the [facility] (6/2020).   - He has been receiving bridging chemotherapy with oral 6MP, methotrexate and ruxolitinib. All oral chemotherapy stopped on 7/12.   - BM this week showed 75% involvement with leukemia. CSF was negative.   - Outpatient lymphodepleting chemotherapy per [id number] with cytoxan and fludarabine 7/21-7/24 completed as an outpatient     FEN/Renal:   # Risk for malnutrition: Appetite remains decreased  - Continue nutritional support with TPN/IL  - Age appropriate diet   - Monitor nutritional intake     # Risk for electrolyte abnormalities:  - Check daily electrolytes     # Risk for renal dysfunction/risk for ELIZ secondary to potential tumor lysis with preparatory chemotherapy.: GFR 133ml/min  - Daily electrolyte monitoring  - Monitor daily weights     # Risk for TLS: Uric Acid 2.3 today.  - Continues Allopurinol TID but generally only taking one dose daily outpatient due to nausea/mucositis     Pulmonary:  # Risk for pulmonary insufficiency: No current concerns. Normal chest and sinus CTs during work-up.      Cardiovascular:  Aaron-Parkinson-White Syndrome: diagnosed upon syncope in 02/2018. Asymptomatic, no interventions to date. No current concerns.  - EKG 7/15 revealed WPW with normal sinus rhythm.   - Echocardiogram 7/15 showed LVEF of 62%  - Cardiology to decide if they want to assess him.     Heme:   # Pancytopenia secondary to chemotherapy:  - Transfuse for hemoglobin < 7, platelets < 10,000   -  Mild hives noted at end of pRBC transfusion this morning, will premedicate with Tylenol and Benadryl going forward with pRBC transfusions.   - Recent history of  significant hives with platelet transfusions x last 5 transfusions. Was being premedicated with hydrocortisone,  tylenol, and benadryl for platelets. However, no hydrocortisone premedication ordered now given upcoming CAR-T.   - No GCSF per protocol     Infectious Disease:  # Febrile Neutropenia: Admitted for fever, continue Cefepime and follow blood cultures closely  - Intermittent decreases in BPs overnight, but overall hemodynamically stable and improved this morning   - Covid negative 7/17 and 7/24     # Risk for infection given immunocompromised status:    Active: See above  Prophylaxis:                                                                      - Viral prophylaxis: HD Valtrex continue through Day +30  - Fungal prophylaxis: Micafungin continue through Day +30. History of photoxic drug eruption with voriconazole, avoid use.   - Bacterial prophylaxis: Levo on hold, continue cefepime IV  - PCP prophylaxis: Bactrim to begin day +28      GI:   # Nausea:  - Scheduled medications: Kytril BID  - PRN medications: Benadryl PRN     # Acid reflux:  Protonix once daily. Continue TUMS prn.     # Transaminitis:  Possibly related to recent chemotherapy. May warrant RUQ US to further assess persistent transamnitis.    - Recently downtrending. , AST 32 today.     # Abdominal pain and diarrhea: One episode overnight 7/22  - Consider stool collection if frequency increases     Neuro:  # Mucositis/pain: Throat pain persists  -  Covid negative 7/17 and 7/24  - Oxycodone PRN available  - Tramadol prn     Oral Hygiene:   # Receding gums, risk for infection  - Continue chlorhexidine mouth wash for symptomatic control, pain now improved     Derm:  No current rash. He is no longer using creams.     Fertility:  Sperm collected pre chemotherapy for fertility preservation     Discharge Considerations: Expected lengths of hospitalization for patients with complications from stem cell transplant vary based on the  complication(s) and severity(ies). A typical stay is 7 days.     The above plan of care was developed by and communicated to me by the Pediatric BMT attending physician, MD Ester Jacinto MD  Pediatric BMT Hospitalist        BMT Attending Note:    Artemio Nino was admitted last PM with a fever. Cultures are negative thus far. He continues to have some issues with a sore throat and anterior cervical adenopathy. He remains neutropenic.  We anticipate that he will receive his cell infusion on Monday. He will conintue on antibiotics for now. and on TPN to augment his nutrition.      I have reviewed the vital signs, medications and lab results.  I assisted in formulating a plan, which was instituted by the BMT team. The total amount of time spent in the admission and care of Artemio Nino today was >40 minutes, at least 50% of which was counseling and coordination of care.     Candido Joshua MD  Professor, Dept. Of Pediatrics  Division of Blood and Marrow Transplantation        The above plan of care was developed by and communicated to me by the   Pediatric BMT attending physician,  Dr. Candido Joshua.     Ester Ochoa MD  Pediatric BMT Hospitalist       Patient Active Problem List   Diagnosis     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)     Aaron-Parkinson-White (WPW) syndrome     Bone marrow transplant candidate     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)     At risk for infection     S/P allogeneic bone marrow transplant (H)     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

## 2020-07-26 LAB
ALBUMIN SERPL-MCNC: 3.1 G/DL (ref 3.4–5)
ALP SERPL-CCNC: 66 U/L (ref 40–150)
ALT SERPL W P-5'-P-CCNC: 110 U/L (ref 0–70)
ANION GAP SERPL CALCULATED.3IONS-SCNC: 4 MMOL/L (ref 3–14)
AST SERPL W P-5'-P-CCNC: 19 U/L (ref 0–45)
BILIRUB DIRECT SERPL-MCNC: 0.3 MG/DL (ref 0–0.2)
BILIRUB SERPL-MCNC: 0.7 MG/DL (ref 0.2–1.3)
BUN SERPL-MCNC: 19 MG/DL (ref 7–30)
CALCIUM SERPL-MCNC: 8.4 MG/DL (ref 8.5–10.1)
CHLORIDE SERPL-SCNC: 112 MMOL/L (ref 94–109)
CO2 SERPL-SCNC: 25 MMOL/L (ref 20–32)
CREAT SERPL-MCNC: 0.51 MG/DL (ref 0.66–1.25)
DIFFERENTIAL METHOD BLD: ABNORMAL
ERYTHROCYTE [DISTWIDTH] IN BLOOD BY AUTOMATED COUNT: 13.8 % (ref 10–15)
GFR SERPL CREATININE-BSD FRML MDRD: >90 ML/MIN/{1.73_M2}
GLUCOSE SERPL-MCNC: 114 MG/DL (ref 70–99)
HCT VFR BLD AUTO: 25.1 % (ref 40–53)
HGB BLD-MCNC: 8.7 G/DL (ref 13.3–17.7)
INR PPP: 1.21 (ref 0.86–1.14)
MAGNESIUM SERPL-MCNC: 2.2 MG/DL (ref 1.6–2.3)
MCH RBC QN AUTO: 28.8 PG (ref 26.5–33)
MCHC RBC AUTO-ENTMCNC: 34.7 G/DL (ref 31.5–36.5)
MCV RBC AUTO: 83 FL (ref 78–100)
PHOSPHATE SERPL-MCNC: 3.9 MG/DL (ref 2.5–4.5)
PLATELET # BLD AUTO: 27 10E9/L (ref 150–450)
POTASSIUM SERPL-SCNC: 3.6 MMOL/L (ref 3.4–5.3)
PREALB SERPL IA-MCNC: 20 MG/DL (ref 15–45)
PROT SERPL-MCNC: 6.5 G/DL (ref 6.8–8.8)
RBC # BLD AUTO: 3.02 10E12/L (ref 4.4–5.9)
SODIUM SERPL-SCNC: 140 MMOL/L (ref 133–144)
URATE SERPL-MCNC: 1.6 MG/DL (ref 3.5–7.2)
WBC # BLD AUTO: 0.2 10E9/L (ref 4–11)

## 2020-07-26 PROCEDURE — 84550 ASSAY OF BLOOD/URIC ACID: CPT | Performed by: STUDENT IN AN ORGANIZED HEALTH CARE EDUCATION/TRAINING PROGRAM

## 2020-07-26 PROCEDURE — 85610 PROTHROMBIN TIME: CPT | Performed by: STUDENT IN AN ORGANIZED HEALTH CARE EDUCATION/TRAINING PROGRAM

## 2020-07-26 PROCEDURE — 25000128 H RX IP 250 OP 636: Performed by: PEDIATRICS

## 2020-07-26 PROCEDURE — 25800030 ZZH RX IP 258 OP 636: Performed by: STUDENT IN AN ORGANIZED HEALTH CARE EDUCATION/TRAINING PROGRAM

## 2020-07-26 PROCEDURE — 25000132 ZZH RX MED GY IP 250 OP 250 PS 637: Performed by: STUDENT IN AN ORGANIZED HEALTH CARE EDUCATION/TRAINING PROGRAM

## 2020-07-26 PROCEDURE — 84100 ASSAY OF PHOSPHORUS: CPT | Performed by: STUDENT IN AN ORGANIZED HEALTH CARE EDUCATION/TRAINING PROGRAM

## 2020-07-26 PROCEDURE — 83735 ASSAY OF MAGNESIUM: CPT | Performed by: STUDENT IN AN ORGANIZED HEALTH CARE EDUCATION/TRAINING PROGRAM

## 2020-07-26 PROCEDURE — 25000125 ZZHC RX 250: Performed by: PEDIATRICS

## 2020-07-26 PROCEDURE — 84134 ASSAY OF PREALBUMIN: CPT | Performed by: STUDENT IN AN ORGANIZED HEALTH CARE EDUCATION/TRAINING PROGRAM

## 2020-07-26 PROCEDURE — 25800030 ZZH RX IP 258 OP 636: Performed by: PEDIATRICS

## 2020-07-26 PROCEDURE — 25000128 H RX IP 250 OP 636: Performed by: STUDENT IN AN ORGANIZED HEALTH CARE EDUCATION/TRAINING PROGRAM

## 2020-07-26 PROCEDURE — 80053 COMPREHEN METABOLIC PANEL: CPT | Performed by: STUDENT IN AN ORGANIZED HEALTH CARE EDUCATION/TRAINING PROGRAM

## 2020-07-26 PROCEDURE — 20600000 ZZH R&B BMT

## 2020-07-26 PROCEDURE — 85027 COMPLETE CBC AUTOMATED: CPT

## 2020-07-26 PROCEDURE — 82248 BILIRUBIN DIRECT: CPT | Performed by: STUDENT IN AN ORGANIZED HEALTH CARE EDUCATION/TRAINING PROGRAM

## 2020-07-26 RX ADMIN — PANTOPRAZOLE SODIUM 40 MG: 20 TABLET, DELAYED RELEASE ORAL at 07:59

## 2020-07-26 RX ADMIN — Medication 150 MG: at 19:44

## 2020-07-26 RX ADMIN — SODIUM CHLORIDE: 900 INJECTION INTRAVENOUS at 00:51

## 2020-07-26 RX ADMIN — CHLORHEXIDINE GLUCONATE 0.12% ORAL RINSE 15 ML: 1.2 LIQUID ORAL at 07:59

## 2020-07-26 RX ADMIN — CHLORHEXIDINE GLUCONATE 0.12% ORAL RINSE 15 ML: 1.2 LIQUID ORAL at 19:44

## 2020-07-26 RX ADMIN — VALACYCLOVIR HYDROCHLORIDE 1000 MG: 500 TABLET, FILM COATED ORAL at 19:44

## 2020-07-26 RX ADMIN — CEFEPIME 2000 MG: 2 INJECTION, POWDER, FOR SOLUTION INTRAVENOUS at 23:50

## 2020-07-26 RX ADMIN — CEFEPIME 2000 MG: 2 INJECTION, POWDER, FOR SOLUTION INTRAVENOUS at 15:42

## 2020-07-26 RX ADMIN — I.V. FAT EMULSION 250 ML: 20 EMULSION INTRAVENOUS at 19:42

## 2020-07-26 RX ADMIN — CEFEPIME 2000 MG: 2 INJECTION, POWDER, FOR SOLUTION INTRAVENOUS at 00:51

## 2020-07-26 RX ADMIN — VALACYCLOVIR HYDROCHLORIDE 1000 MG: 500 TABLET, FILM COATED ORAL at 13:43

## 2020-07-26 RX ADMIN — CEFEPIME 2000 MG: 2 INJECTION, POWDER, FOR SOLUTION INTRAVENOUS at 07:59

## 2020-07-26 RX ADMIN — PHYTONADIONE: 1 INJECTION, EMULSION INTRAMUSCULAR; INTRAVENOUS; SUBCUTANEOUS at 19:41

## 2020-07-26 RX ADMIN — Medication 150 MG: at 13:43

## 2020-07-26 RX ADMIN — HEPARIN, PORCINE (PF) 10 UNIT/ML INTRAVENOUS SYRINGE 5 ML: at 07:59

## 2020-07-26 RX ADMIN — VALACYCLOVIR HYDROCHLORIDE 1000 MG: 500 TABLET, FILM COATED ORAL at 07:59

## 2020-07-26 RX ADMIN — Medication 150 MG: at 07:59

## 2020-07-26 RX ADMIN — MICAFUNGIN SODIUM 50 MG: 10 INJECTION, POWDER, LYOPHILIZED, FOR SOLUTION INTRAVENOUS at 19:54

## 2020-07-26 NOTE — PROGRESS NOTES
Pt afebrile, AVS stable and within parameter. Lung sounds clear. No complaints of pain or nausea, no appetite. Adequate UOP. Taking medications without issue. No further concerns.

## 2020-07-26 NOTE — PROGRESS NOTES
Artemio has been afebrile. VSS. Lungs clear on RA. Pt slept throughout night. No N/V or pain. Good UOP. No stool. Hourly rounding completed. Continue with POC.

## 2020-07-26 NOTE — PROGRESS NOTES
Pediatric BMT Daily Progress Note    Interval Events: Isael was admitted 7/24 for fever. He has remained hemodynamically stable with blood cultures remaining no growth to date. Received pRBC overnight for hemoglobin 6.4, today's hemoglobin is 8.7. T.max in last 24 hours 100.0. Continues to complain of sore throat, but interest somewhat in oral intake and able to small amounts eat orally. But remains on 12 hour TPN/lipids for nutritionally. Otherwise no complaints on morning rounds.     Review of Systems: Pertinent positives include those mentioned in interval events. A complete review of systems was performed and is otherwise negative.      Medications:  Please see MAR    Physical Exam:  Temp:  [98.6  F (37  C)-100  F (37.8  C)] 98.6  F (37  C)  Pulse:  [67-73] 68  Heart Rate:  [67-81] 67  Resp:  [16-20] 20  BP: ()/(55-76) 103/65  SpO2:  [99 %-100 %] 99 %  I/O last 3 completed shifts:  In: 4679.8 [I.V.:2010]  Out: 2805 [Urine:2805]    GEN: Lying in bed, no acute distress, appears pale and fatigued. Mother present  HEENT: Full head of hair, normocephalic, sclerae clear, PERRL, nares patent, OP with mild erythema, no discreet lesions or exudate visualized. No cervical lymphadenopathy appreciated.   CARD: RRR, normal S1/S2 without murmur. Cap refill < 2 sec  RESP: Lungs CTA bilaterally without adventitious lung sounds  ABD: Soft NT, ND, no organomegaly.  EXTREM: warm and well perfused.   SKIN: Pale throughout, clear without erythema, rash or lesions.  ACCESS: CVC and port    Labs:  Labs reviewed, pertinent findings WBC 0.2, Hb 8.7, platelets 27,000. BUN 19, creat 0.51.    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 CD19 positive (dim).      Isael completed non-myleoablative chemotherapy prep as an  outpatient, but was admitted for fever on 7/24, blood cultures no growth to date.  He was previously scheduled for admission on 7/26 prior to [facility] on 7/27. Plan is to receive Kymriah on 7/27 (needs to have pre and post flush). Continues to complain of throat pain but not using oxycodone and able to eat. .      ALL and BMT: High risk B cell ALL (CNS negative) + JAK2 activation.  - Related bone marrow transplant 11/2/18  - Relapsed 6/5/2020, 19 months status post matched related BMT per [id number] (cytoxan/TBI arm) with CD19 (dim) relapsed b-cell lymphoblastic leukemia.  - Completed apheresis collection in the [facility] (6/2020).   - He has been receiving bridging chemotherapy with oral 6MP, methotrexate and ruxolitinib. All oral chemotherapy stopped on 7/12.   - BM this week showed 75% involvement with leukemia. CSF was negative.   - Outpatient lymphodepleting chemotherapy per [id number] with cytoxan and fludarabine 7/21-7/24 completed as an outpatient    K[facility]ah infusion 7/27   -Pre-meds of Tylenol and Benadryl   -D5% 1/2NS  2 hours prior to Kym[facility] infusion continue 30 minute during infusion and 2.5 hours post      FEN/Renal:   # Risk for malnutrition: Appetite remains decreased  - Continue nutritional support with TPN/IL  - Age appropriate diet   - Monitor nutritional intake     # Risk for electrolyte abnormalities:  - Check daily electrolytes     # Risk for renal dysfunction/risk for ELIZ secondary to potential tumor lysis with preparatory chemotherapy.: GFR 133ml/min  - Daily electrolyte monitoring  - Monitor daily weights     # Risk for TLS: Uric Acid 1.6 today.  - Continues Allopurinol TID but generally only taking one dose daily outpatient due to nausea/throat pain     Pulmonary:  # Risk for pulmonary insufficiency: No current concerns. Normal chest and sinus CTs during work-up.      Cardiovascular:  Aaron-Parkinson-White Syndrome: diagnosed upon syncope in 02/2018. Asymptomatic, no interventions to  date. No current concerns.  - EKG 7/15 revealed WPW with normal sinus rhythm.   - Echocardiogram 7/15 showed LVEF of 62%  - Cardiology to decide if they want to assess him.     Heme:   # Pancytopenia secondary to chemotherapy:  - Transfuse for hemoglobin < 7, platelets < 10,000   -  Mild hives noted at end of pRBC transfusion this 7/25. Premedicate with Tylenol and Benadryl for pRBC transfusions.   - Recent history of significant hives with platelet transfusions x last 5 transfusions. Was being premedicated with hydrocortisone, tylenol, and benadryl for platelets. However, no hydrocortisone premedication ordered now given upcoming CAR-T.   - No GCSF per protocol     Infectious Disease:  # Febrile Neutropenia: Admitted for fever, continue Cefepime and follow blood cultures closely  - Intermittent decreases in BPs overnight, but overall hemodynamically stable and improved this morning   - Covid negative 7/17 and 7/24     # Risk for infection given immunocompromised status:    Active: See above  Prophylaxis:                                                                      - Viral prophylaxis: HD Valtrex continue through Day +30  - Fungal prophylaxis: Micafungin continue through Day +30. History of photoxic drug eruption with voriconazole, avoid use.   - Bacterial prophylaxis: Levo on hold, continue cefepime IV neutropenic no change today fevers maybe related to his leukemia   - PCP prophylaxis: Bactrim to begin day +28      GI:   # Nausea:  - Scheduled medications: Kytril BID- discontinued per patient request 7/26  - PRN medications: Benadryl PRN     # Acid reflux:  Protonix once daily. Continue TUMS prn.     # Transaminitis:  Possibly related to recent chemotherapy. May warrant RUQ US to further assess persistent transamnitis.    - Recently downtrending. , AST 19 today.     # Abdominal pain and diarrhea: One episode overnight 7/22  - Consider stool collection if frequency increases     Neuro:  #  Mucositis/pain: Throat pain persists  -  Covid negative 7/17 and 7/24  - Oxycodone PRN available  - Tramadol prn     Oral Hygiene:   # Receding gums, risk for infection  - Continue chlorhexidine mouth wash for symptomatic control, pain now improved     Derm:  No current rash. He is no longer using creams.     Fertility:  Sperm collected pre chemotherapy for fertility preservation     Discharge Considerations: Expected lengths of hospitalization for patients with complications from stem cell transplant vary based on the complication(s) and severity(ies). A typical stay is 7 days.     The above plan of care was developed by and communicated to me by the Pediatric BMT attending physician, MD Lilia Jacinto MSN, CPNP-  Pediatric Blood and Marrow Transplant Program  [facility] [phone]  Pager [phone]        BMT Attending Note:    Artemio was admitted several days ago with a fever, although cultures remain negative thus far. His mouth/throat pain is improved somewhat. He was taking minimal fluids/solids over the past 24 hours. He remains neutropenic.  We anticipate that he will receive his cell infusion as planned tomorrow. He will conintue on antibiotics for now. and on TPN to augment his nutrition.      I have reviewed the vital signs, medications and lab results.  I assisted in formulating a plan, which was instituted by the BMT team. The total amount of time spent in the admission and care of Artemio Nino today was >40 minutes, at least 50% of which was counseling and coordination of care.     Candido Joshua MD  Professor, Dept. Of Pediatrics  Division of Blood and Marrow Transplantation         Patient Active Problem List   Diagnosis     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)     Aaron-Parkinson-White (WPW) syndrome     Bone marrow transplant candidate     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)     At risk for infection     S/P allogeneic bone marrow transplant  (H)     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

## 2020-07-27 LAB
ABO + RH BLD: NORMAL
ABO + RH BLD: NORMAL
ALBUMIN SERPL-MCNC: 3.2 G/DL (ref 3.4–5)
ALP SERPL-CCNC: 77 U/L (ref 40–150)
ALT SERPL W P-5'-P-CCNC: 100 U/L (ref 0–70)
ANION GAP SERPL CALCULATED.3IONS-SCNC: 2 MMOL/L (ref 3–14)
APTT PPP: 38 SEC (ref 22–37)
AST SERPL W P-5'-P-CCNC: 24 U/L (ref 0–45)
BILIRUB SERPL-MCNC: 0.8 MG/DL (ref 0.2–1.3)
BLD GP AB SCN SERPL QL: NORMAL
BLOOD BANK CMNT PATIENT-IMP: NORMAL
BUN SERPL-MCNC: 18 MG/DL (ref 7–30)
CALCIUM SERPL-MCNC: 8.6 MG/DL (ref 8.5–10.1)
CD19 CELLS # BLD: 16 CELLS/UL (ref 107–698)
CD19 CELLS NFR BLD: 21 % (ref 6–27)
CD3 CELLS # BLD: 58 CELLS/UL (ref 603–2990)
CD3 CELLS NFR BLD: 76 % (ref 49–84)
CD3+CD4+ CELLS # BLD: 51 CELLS/UL (ref 441–2156)
CD3+CD4+ CELLS NFR BLD: 67 % (ref 28–63)
CD3+CD4+ CELLS/CD3+CD8+ CLL BLD: 6.7 % (ref 1.4–2.6)
CD3+CD8+ CELLS # BLD: 7 CELLS/UL (ref 125–1312)
CD3+CD8+ CELLS NFR BLD: 10 % (ref 10–40)
CD3-CD16+CD56+ CELLS # BLD: 1 CELLS/UL (ref 95–640)
CD3-CD16+CD56+ CELLS NFR BLD: 2 % (ref 4–25)
CHLORIDE SERPL-SCNC: 112 MMOL/L (ref 94–109)
CO2 SERPL-SCNC: 26 MMOL/L (ref 20–32)
CREAT SERPL-MCNC: 0.57 MG/DL (ref 0.66–1.25)
CRP SERPL-MCNC: 50.4 MG/L (ref 0–8)
D DIMER PPP FEU-MCNC: 0.3 UG/ML FEU (ref 0–0.5)
DIFFERENTIAL METHOD BLD: ABNORMAL
ERYTHROCYTE [DISTWIDTH] IN BLOOD BY AUTOMATED COUNT: 13.7 % (ref 10–15)
FERRITIN SERPL-MCNC: 2149 NG/ML (ref 26–388)
FIBRINOGEN PPP-MCNC: 518 MG/DL (ref 200–420)
GFR SERPL CREATININE-BSD FRML MDRD: >90 ML/MIN/{1.73_M2}
GLUCOSE SERPL-MCNC: 113 MG/DL (ref 70–99)
HCT VFR BLD AUTO: 25.4 % (ref 40–53)
HGB BLD-MCNC: 8.8 G/DL (ref 13.3–17.7)
IFC SPECIMEN: ABNORMAL
IGG SERPL-MCNC: 740 MG/DL (ref 610–1616)
INR PPP: 1.14 (ref 0.86–1.14)
LDH SERPL L TO P-CCNC: 106 U/L (ref 85–227)
MAGNESIUM SERPL-MCNC: 1.9 MG/DL (ref 1.6–2.3)
MCH RBC QN AUTO: 28.7 PG (ref 26.5–33)
MCHC RBC AUTO-ENTMCNC: 34.6 G/DL (ref 31.5–36.5)
MCV RBC AUTO: 83 FL (ref 78–100)
PHOSPHATE SERPL-MCNC: 4.1 MG/DL (ref 2.5–4.5)
PLATELET # BLD AUTO: 21 10E9/L (ref 150–450)
POTASSIUM SERPL-SCNC: 3.6 MMOL/L (ref 3.4–5.3)
PREALB SERPL IA-MCNC: 22 MG/DL (ref 15–45)
PROT SERPL-MCNC: 6.6 G/DL (ref 6.8–8.8)
RBC # BLD AUTO: 3.07 10E12/L (ref 4.4–5.9)
SODIUM SERPL-SCNC: 140 MMOL/L (ref 133–144)
SPECIMEN EXP DATE BLD: NORMAL
TRIGL SERPL-MCNC: 67 MG/DL
URATE SERPL-MCNC: 1.3 MG/DL (ref 3.5–7.2)
WBC # BLD AUTO: 0.2 10E9/L (ref 4–11)

## 2020-07-27 PROCEDURE — 85379 FIBRIN DEGRADATION QUANT: CPT | Performed by: PHYSICIAN ASSISTANT

## 2020-07-27 PROCEDURE — 86901 BLOOD TYPING SEROLOGIC RH(D): CPT | Performed by: STUDENT IN AN ORGANIZED HEALTH CARE EDUCATION/TRAINING PROGRAM

## 2020-07-27 PROCEDURE — 86357 NK CELLS TOTAL COUNT: CPT | Performed by: PHYSICIAN ASSISTANT

## 2020-07-27 PROCEDURE — 86359 T CELLS TOTAL COUNT: CPT | Performed by: PHYSICIAN ASSISTANT

## 2020-07-27 PROCEDURE — 84478 ASSAY OF TRIGLYCERIDES: CPT | Performed by: STUDENT IN AN ORGANIZED HEALTH CARE EDUCATION/TRAINING PROGRAM

## 2020-07-27 PROCEDURE — XW043C3 INTRODUCTION OF ENGINEERED AUTOLOGOUS CHIMERIC ANTIGEN RECEPTOR T-CELL IMMUNOTHERAPY INTO CENTRAL VEIN, PERCUTANEOUS APPROACH, NEW TECHNOLOGY GROUP 3: ICD-10-PCS | Performed by: PEDIATRICS

## 2020-07-27 PROCEDURE — 25000132 ZZH RX MED GY IP 250 OP 250 PS 637: Performed by: PHYSICIAN ASSISTANT

## 2020-07-27 PROCEDURE — 85610 PROTHROMBIN TIME: CPT | Performed by: STUDENT IN AN ORGANIZED HEALTH CARE EDUCATION/TRAINING PROGRAM

## 2020-07-27 PROCEDURE — 25000132 ZZH RX MED GY IP 250 OP 250 PS 637: Performed by: STUDENT IN AN ORGANIZED HEALTH CARE EDUCATION/TRAINING PROGRAM

## 2020-07-27 PROCEDURE — 84100 ASSAY OF PHOSPHORUS: CPT | Performed by: STUDENT IN AN ORGANIZED HEALTH CARE EDUCATION/TRAINING PROGRAM

## 2020-07-27 PROCEDURE — 20600000 ZZH R&B BMT

## 2020-07-27 PROCEDURE — 86355 B CELLS TOTAL COUNT: CPT | Performed by: PHYSICIAN ASSISTANT

## 2020-07-27 PROCEDURE — 86900 BLOOD TYPING SEROLOGIC ABO: CPT | Performed by: STUDENT IN AN ORGANIZED HEALTH CARE EDUCATION/TRAINING PROGRAM

## 2020-07-27 PROCEDURE — 83615 LACTATE (LD) (LDH) ENZYME: CPT | Performed by: PHYSICIAN ASSISTANT

## 2020-07-27 PROCEDURE — 86850 RBC ANTIBODY SCREEN: CPT | Performed by: STUDENT IN AN ORGANIZED HEALTH CARE EDUCATION/TRAINING PROGRAM

## 2020-07-27 PROCEDURE — 25000128 H RX IP 250 OP 636: Performed by: PEDIATRICS

## 2020-07-27 PROCEDURE — 25800025 ZZH RX 258: Performed by: PHYSICIAN ASSISTANT

## 2020-07-27 PROCEDURE — 85027 COMPLETE CBC AUTOMATED: CPT

## 2020-07-27 PROCEDURE — 83735 ASSAY OF MAGNESIUM: CPT | Performed by: STUDENT IN AN ORGANIZED HEALTH CARE EDUCATION/TRAINING PROGRAM

## 2020-07-27 PROCEDURE — 25000125 ZZHC RX 250: Performed by: PEDIATRICS

## 2020-07-27 PROCEDURE — 84550 ASSAY OF BLOOD/URIC ACID: CPT | Performed by: STUDENT IN AN ORGANIZED HEALTH CARE EDUCATION/TRAINING PROGRAM

## 2020-07-27 PROCEDURE — 25000128 H RX IP 250 OP 636: Performed by: STUDENT IN AN ORGANIZED HEALTH CARE EDUCATION/TRAINING PROGRAM

## 2020-07-27 PROCEDURE — 84134 ASSAY OF PREALBUMIN: CPT | Performed by: STUDENT IN AN ORGANIZED HEALTH CARE EDUCATION/TRAINING PROGRAM

## 2020-07-27 PROCEDURE — 82784 ASSAY IGA/IGD/IGG/IGM EACH: CPT | Performed by: PHYSICIAN ASSISTANT

## 2020-07-27 PROCEDURE — 25800030 ZZH RX IP 258 OP 636: Performed by: PHYSICIAN ASSISTANT

## 2020-07-27 PROCEDURE — 86360 T CELL ABSOLUTE COUNT/RATIO: CPT | Performed by: PHYSICIAN ASSISTANT

## 2020-07-27 PROCEDURE — 25800030 ZZH RX IP 258 OP 636: Performed by: PEDIATRICS

## 2020-07-27 PROCEDURE — 80053 COMPREHEN METABOLIC PANEL: CPT | Performed by: STUDENT IN AN ORGANIZED HEALTH CARE EDUCATION/TRAINING PROGRAM

## 2020-07-27 PROCEDURE — 82728 ASSAY OF FERRITIN: CPT | Performed by: PHYSICIAN ASSISTANT

## 2020-07-27 PROCEDURE — 85730 THROMBOPLASTIN TIME PARTIAL: CPT | Performed by: PHYSICIAN ASSISTANT

## 2020-07-27 PROCEDURE — 85384 FIBRINOGEN ACTIVITY: CPT | Performed by: PHYSICIAN ASSISTANT

## 2020-07-27 PROCEDURE — 86140 C-REACTIVE PROTEIN: CPT | Performed by: PHYSICIAN ASSISTANT

## 2020-07-27 PROCEDURE — 25000128 H RX IP 250 OP 636: Performed by: PHYSICIAN ASSISTANT

## 2020-07-27 RX ORDER — DIPHENHYDRAMINE HYDROCHLORIDE 50 MG/ML
50 INJECTION INTRAMUSCULAR; INTRAVENOUS ONCE
Status: COMPLETED | OUTPATIENT
Start: 2020-07-27 | End: 2020-07-27

## 2020-07-27 RX ORDER — CHLORHEXIDINE GLUCONATE ORAL RINSE 1.2 MG/ML
15 SOLUTION DENTAL 2 TIMES DAILY PRN
Status: DISCONTINUED | OUTPATIENT
Start: 2020-07-27 | End: 2020-08-14 | Stop reason: HOSPADM

## 2020-07-27 RX ORDER — ACETAMINOPHEN 325 MG/1
650 TABLET ORAL ONCE
Status: COMPLETED | OUTPATIENT
Start: 2020-07-27 | End: 2020-07-27

## 2020-07-27 RX ADMIN — Medication 150 MG: at 08:07

## 2020-07-27 RX ADMIN — VALACYCLOVIR HYDROCHLORIDE 1000 MG: 500 TABLET, FILM COATED ORAL at 20:21

## 2020-07-27 RX ADMIN — MICAFUNGIN SODIUM 50 MG: 10 INJECTION, POWDER, LYOPHILIZED, FOR SOLUTION INTRAVENOUS at 20:23

## 2020-07-27 RX ADMIN — DEXTROSE AND SODIUM CHLORIDE: 5; 450 INJECTION, SOLUTION INTRAVENOUS at 09:12

## 2020-07-27 RX ADMIN — VALACYCLOVIR HYDROCHLORIDE 1000 MG: 500 TABLET, FILM COATED ORAL at 13:39

## 2020-07-27 RX ADMIN — CEFEPIME 2000 MG: 2 INJECTION, POWDER, FOR SOLUTION INTRAVENOUS at 16:32

## 2020-07-27 RX ADMIN — VALACYCLOVIR HYDROCHLORIDE 1000 MG: 500 TABLET, FILM COATED ORAL at 08:07

## 2020-07-27 RX ADMIN — Medication 150 MG: at 13:39

## 2020-07-27 RX ADMIN — PHYTONADIONE: 1 INJECTION, EMULSION INTRAMUSCULAR; INTRAVENOUS; SUBCUTANEOUS at 20:28

## 2020-07-27 RX ADMIN — CEFEPIME 2000 MG: 2 INJECTION, POWDER, FOR SOLUTION INTRAVENOUS at 08:07

## 2020-07-27 RX ADMIN — Medication 150 MG: at 20:21

## 2020-07-27 RX ADMIN — DIPHENHYDRAMINE HYDROCHLORIDE 50 MG: 50 INJECTION, SOLUTION INTRAMUSCULAR; INTRAVENOUS at 10:19

## 2020-07-27 RX ADMIN — TISAGENLECLEUCEL 1 EACH: 2000000 INJECTION, SUSPENSION INTRAVENOUS at 11:03

## 2020-07-27 RX ADMIN — I.V. FAT EMULSION 250 ML: 20 EMULSION INTRAVENOUS at 20:26

## 2020-07-27 RX ADMIN — ACETAMINOPHEN 650 MG: 325 TABLET, FILM COATED ORAL at 10:19

## 2020-07-27 RX ADMIN — PANTOPRAZOLE SODIUM 40 MG: 20 TABLET, DELAYED RELEASE ORAL at 08:07

## 2020-07-27 RX ADMIN — SODIUM CHLORIDE: 900 INJECTION INTRAVENOUS at 22:06

## 2020-07-27 RX ADMIN — CHLORHEXIDINE GLUCONATE 0.12% ORAL RINSE 15 ML: 1.2 LIQUID ORAL at 08:08

## 2020-07-27 NOTE — PROCEDURES
BMT/Cellular Autologous Product Infusion         Patient Vitals for the past 24 hrs:   Temp Temp src Pulse Resp BP   07/26/20 1115 98.6  F (37  C) Oral 68 20 103/65   07/26/20 1600 99.1  F (37.3  C) Oral 80 18 98/64   07/26/20 2000 99  F (37.2  C) Oral 84 18 99/65   07/26/20 2358 99.7  F (37.6  C) Oral 69 18 97/63   07/27/20 0410 99.3  F (37.4  C) Oral 78 18 92/57   07/27/20 0800 99.1  F (37.3  C) Oral 83 16 (!) 84/57   07/27/20 1100 99.1  F (37.3  C) Oral 68 18 95/68         BMT INFUSION DOCUMENTATION (last 48 hours)      BMT/Cellular Product Infusion     Row Name                  Product 07/27/20 0855 Other (specify in comment)    Product Details Product Release Date: 07/27/20  -NB Product Release Time: 0855  -EV Product Type: Other (specify in comment)  -NB DIN: [id number]  -NB Product Description Code: [id number]  -NB Volume Dispensed (mL): 16 mL  -NB    Checked by (Patient RN)  --       Checked by (Witness)  --       Product Volume Infused (mL)  --       Flush Volume (mL)  --       Volume Dispensed (mL)  --         User Key  (r) = Recorded By, (t) = Taken By, (c) = Cosigned By    Initials Name Effective Dates    EV Ju Maradiaga 04/29/14 -     NB Maddison Silva 04/29/14 -         Allogeneic Donor Eligibility Determination and Summary of Records: N/A - Autologous        Type of Infusion: Autologous      Baseline Pre-Infusion Evaluation (to be completed by Provider):   Dyspnea: Grade 0 - none  Hypoxia: Grade 0 - not present  Fever: Grade 0 - afebrile  Chills: Grade 0 - none  Febrile Neutropenia: Grade 0 - not present  Sinus Bradycardia: Grade 0 - none  Hypertension: Grade 0 - none  Hypotension: Grade 0 - none  Chest Pain: Grade 0 - none  Bronchospasm: Grade 0 - none  Pain: Grade 1 - mild pain  Rash: Grade 0 - None  Neurologic Specify: none    If adverse reactions, events or complications occur (fever greater than 2 degrees fahrenheit increase, and severe reactions of the following types: chills,  dyspnea, bronchospasm, hyper/hypotension, hypoxia, bradycardia, chest pain, back/flank pain, hypoxia, and any other reaction deemed severe or life threatening; any instance of product bag breakage or unusual product appearance)    Any other events that are >= grade 3, then immediately contact the BMT Attending physician, the Cell Therapy Laboratory Medical Director (pager [phone]) and the Cell Therapy Laboratory ([phone]).  After midnight, holidays & weekends contact the [facility] on the appropriate campus ([facility] [place]: [phone]; [facility]: [phone]).    =========================  BMT Post Infusion Documentation    Data   Patient Vitals for the past 72 hrs:   Temp Temp src Pulse Resp Heart Rate BP   07/24/20 1600 101.9  F (38.8  C) Oral 92 16 -- 92/59   07/24/20 1754 103.1  F (39.5  C) Oral 92 18 -- (!) 86/57   07/24/20 1759 102.1  F (38.9  C) Oral 89 -- -- --   07/24/20 1800 -- -- -- -- -- 93/56   07/24/20 1900 100.1  F (37.8  C) Oral 82 18 -- (!) 88/62   07/25/20 0001 100.6  F (38.1  C) Oral 82 18 -- 96/61   07/25/20 0400 99.1  F (37.3  C) Oral 74 18 -- (!) 84/50   07/25/20 0422 99  F (37.2  C) Oral 71 18 -- 91/56   07/25/20 0513 99.1  F (37.3  C) Oral 71 18 -- 92/56   07/25/20 0611 99.4  F (37.4  C) Oral 69 16 -- 95/59   07/25/20 0648 99.3  F (37.4  C) Oral 75 18 -- 91/54   07/25/20 0746 99.8  F (37.7  C) Oral 74 16 -- 97/61   07/25/20 0827 99.9  F (37.7  C) -- 74 -- -- 96/61   07/25/20 1038 99.8  F (37.7  C) Oral -- -- -- --   07/25/20 1234 100  F (37.8  C) Oral -- 18 78 94/65   07/25/20 1800 99.8  F (37.7  C) Oral -- 20 81 94/61   07/26/20 0103 99.1  F (37.3  C) Oral 73 18 -- 92/58   07/26/20 0400 98.9  F (37.2  C) Oral 67 16 -- 97/55   07/26/20 0800 98.9  F (37.2  C) Oral -- 18 67 118/76   07/26/20 1115 98.6  F (37  C) Oral 68 20 -- 103/65   07/26/20 1600 99.1  F (37.3  C) Oral 80 18 -- 98/64   07/26/20 2000 99  F (37.2  C) Oral 84 18 -- 99/65   07/26/20 2358 99.7  F (37.6  C)  Oral 69 18 -- 97/63   07/27/20 0410 99.3  F (37.4  C) Oral 78 18 -- 92/57   07/27/20 0800 99.1  F (37.3  C) Oral 83 16 -- (!) 84/57   07/27/20 1100 99.1  F (37.3  C) Oral 68 18 -- 95/68   07/27/20 1115 -- -- 66 -- -- 98/64   07/27/20 1130 -- -- 79 -- -- 108/63   07/27/20 1145 -- -- 59 -- -- 104/68   07/27/20 1200 -- -- 57 -- -- 97/66   07/27/20 1215 99.2  F (37.3  C) Oral 74 20 -- 91/58        BMT INFUSION DOCUMENTATION (last 24 hours)      BMT/Cellular Product Infusion     Row Name 07/27/20 0800                Cell Therapy Documentation    Product Release Date  07/27/20  -NB       Recipient Study ID  N/A  -NB       Donor  Autologous  -NB       Donor MRN/ID  [id number]  -NB       Donor ABO/Rh  A neg  -NB       Allogeneic Donor Eligibility Determination and Summary of Records  N/A - Autologous  -NB       Type of Infusion  Autologous  -NB       Total Volume Dispensed (mL)  16  -NB       Total NC Dose  1.37E+07  -NB       Total CD34 Dose  N/A  -NB       Total CD3 dose  N/A  -NB       Total NC Dose Left in Storage  NONE  -NB       Comments for Product Issues  Kymriah CAR-T Batch # [id number].Total CAR-pos viable T cells = 1.43E+08  -NB       Donor ABO/Rh  --       Product Types  --       Product Numbers  --       Product Types and Numbers  --       Volume  --       ABO Mismatch  --       ZZTotal NC Dose  --       ZZTotal CD34 Dose  --       ZZTotal NC Dose Left in Storage  --          Product 07/27/20 0855 Other (specify in comment)    Product Details Product Release Date: 07/27/20  -NB Product Release Time: 0855  -EV Product Type: Other (specify in comment)  -NB DIN: [id number]  -NB Product Description Code: [id number]  -NB Volume Dispensed (mL): 16 mL  -NB    Checked by (Patient RN)  --       Checked by (Witness)  --       Product Volume Infused (mL)  --       Flush Volume (mL)  --       Volume Dispensed (mL)  --          RN Documentation    Patient was premedicated as ordered  --       Line Type  --       Patient  Stable Prior to Infusion  --       Time Infusion Started  --       Checked by (Patient RN)  --       Checked by (RN 2)  --       Broken Bag?  --       Immediate suspected transfusion reaction to the product  --       Time Infusion Stopped  --       Total Flush Volume (mL)  --       Checked by (Witness)  --       Date Infusion Started  --       Date Infusion Stopped  --       Volume Infused (mL)  --       Total Volume Infused (cc)  --          Patient tolerance of product infusion    Immediate suspected transfusion reaction to the product  --       Did patient have prior history of similar signs/symptoms during this hospitalization?  --       Symptoms during/after infusion  --       Did the patient tolerate the infusion well  --       Medications and treatment for symptoms  --       Did the symptoms resolve?  --       Enter comments if clots, leaks, broken bag, infusion delays, other issues with bag/infusion  --       Describe symptoms  --         User Key  (r) = Recorded By, (t) = Taken By, (c) = Cosigned By    Initials Name Effective Dates    Ju Goodman 04/29/14 -     NB Maddison Silva 04/29/14 -             Post-Infusion Evaluation:   Infusion Related Reaction: Grade 0 - none  Dyspnea: Grade 0 - none  Hypoxia: Grade 0 - not present  Fever: Grade 0 - afebrile  Chills: Grade 0 - none  Febrile Neutropenia: Grade 0 - not present  Sinus Bradycardia: Grade 0 - none  Hypertension: Grade 0 - none  Hypotension: Grade 0 - none  Chest Pain: Grade 0 - none  Bronchospasm: Grade 0 - none  Pain: Grade 1 - mild pain Preexisting mild throat pain  Rash: Grade 0 - None  Neurologic Specify: none    Candido Han PA-C  Pediatric Blood and Marrow Transplant Program  [facility]

## 2020-07-27 NOTE — PROGRESS NOTES
[facility]   PEDIATRIC BMT SOCIAL WORK PROGRESS NOTE  DATA:     Isael received his CAR-T infusion today. His mother Mervat wasn't able to get here in time due to a miscommunication between staff and Mervat or between Isael and Mervat. Unclear which.  Mervat initially very upset with provider team and nursing. She spoke with several staff members, including . Eventually was de-escalated. Situation resolved.        INTERVENTION:      Supportive counseling     ASSESSMENT:      Patient appears to be coping relatively well at the moment. Mervat is understandably fearful for Isael's future and has difficulty managing her fears which often manifest as anxiety, panic and anger.  SW will continue to help re-direct mervat when she has these difficult moments and assist with other coping strategies as able.     PLAN:    will provide ongoing psychosocial support to patient and family as needed.      REJI Barnes, [facility]    Pediatric Blood and Marrow Transplant  [phone]  [email or web address]                 Copied from chart review:        PEDIATRIC BLOOD & MARROW TRANSPLANT SOCIAL WORK PSYCHOSOCIAL ASSESSMENT                         Date: 7/16/2020          Assessment of living situation, support system, financial status, functional status, coping abilities/strategies, stressors, need for resources and other social work interventions.          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 Viky Zavala      Nurse Coordinator: Daria Ndiaye  Information:       Artemio is a 18 year old male patient with Acute Lymphoblastic Leukemia (ALL). Isael had a bone marrow transplant in fall of 2018. He unfortunately relapsed in early 2020 and required further treatment. His hem/onc physician at [facility] [place] and bmt physician at [facility] both felt like the best treatment option for isael at this time is kymriah/CAR-T.  Isael and his family agreed to pursue this.      Special Considerations/Accomodations:       Patient's parents Dangelo and Mervat are  but amicable. They are both involved in Artemio's care as sources of support. Dangelo is remarried to his wife Camryn and Mervat has a significant other named Jada.  explained to Dangelo and Mervat that the care team will be unable to update multiple family members on a daily basis and recommended they come up with a system to update each other.      Until Mervat can get to [place], the team will try to remind Isael to have his mom on speaker phone during rounds so that she has an opportunity to hear the doctor's updates and ask questions in real time. Mervat struggles to cope if she cannot get information about Isael's medical progress.  Mervat intends to be in [place] within a week or so of admission on 7/17/20            Contact/Legal Info:       Decision Maker(s): Artemio is an adult and able to make his own medical decisions at this time. He wants all medical decisions to go through him and not his parents.      Special Custody Considerations: N/A      Advance Directive: Artemio states he completed an advanced directive while undergoing cancer treatment at [facility]. He signed a release of information so his treatment team at [facility] can access a copy.       Permanent Address: [address] [place] [address] Lives with  paternal grandmother and paternal grandfather      Local Address: [address] [place] [address]   Staying locally with father and step  mother until he is greater than 100 days post BMT.      Phone number(s): Mom - Mervat: [phone]                                                       Dad - Dangelo: [phone]                                                       Step-Mom - Camryn: [phone]              Support System/Relationship Status/Family History: Artemio's parents and paternal grandparents are his primary support system. His parents, Mevrat and Dangelo, are  and civil, but prefer to visit Artemio at different times and communicate directly with Artemio versus with each other as much as possible.  Dangelo is remarried and his wife's name is Camryn. Artemio has given verbal permission for his parents and Camryn all to get any information they would like to know. Edwige explained to all of his parents that the medical team will not be able to update multiple family members per day and that they should try to update each other as much as possible.   Artemio also has two siblings, an older sister and brother. His sister Kamilah lives locally in [place], [place] is  and has one child. His brother Link lives in [place]. Mervat has relocated back to [place] to live with her significant other, Jada, but is coming back to [place] to assist with the transplant process.   Patient has been living in his patnernal grandparents basement. There historically have been some concerns from various family members that this environment was not suitable for a post BMT patient. (reasoning was that it was damp and musty and it was unknown if mold and mildew were present. Isael will be staying with his mom at a hotel paid for by [employer] post CAR-T infusion.  Artemio states he does not have a significant other at this time.          Unique Patient/Family Needs:  Establishing effective communication pattern with patient and his parents.      Spirituality/Lottie Affiliation: Patient does not identify with lottie community        Communication  "Preferences: Artemio wants all medical communication and decisions to go through him. He prefers direct to the point communication. He does not like to be bothered with examinations and questions if they're not necessary.              Caregiver Plan: Mervat will be primary caregiver throughout kymriah process.      Patient & Caregiver Knowledge of Medical Condition and Plan of Care: Artemio and his family are understanding of his medical condition and plan of care for BMT.      Patient and Caregiver Transplant Goals: Artemio and his parents state they \"Just want to get through it\".              Patient Personality/Communication/Coping/Interests/Activities: approachable, withdrawn and quiet. Patient states that when he doesn't feel good he irish by playing video games, watching movies or TV and sleeping. He also states he's not a morning person and prefers not to be bothered at night or if he's napping. He requested nursing bundle cares when possible. Edwige explained that the nurses try to be as accommodating as possible to patient preferences but it is not always possible due to the large number tasks they need to complete every day with every patient.      Patient Education/Developmental Level: Patient graduated from [employer] approximately a year ago and had been taking some time off before deciding about college. He plans to revisit applying to higher education once he is done with the BMT process.              Logistical Considerations:  Transportation: Private Car  Parking: at this time parents state they can afford to buy monthly parking passes.  Housing: patient and family is local.  Mervat has rented a short term apartment in [place] until Artemio is through transplant and then she can return to [place].          Financial: Patient and his parents state that at this time they are able to meet their expenses with their current income. Because he essentially has 3 parents to share the care giving " work, they all plan to continue working and therefore are hoping to maintain their current income.      Insurance:   Primary: [employer] of [place], Camryn carries this insurance.  Secondary: Priscilla ROBERTS  Unique Issues?:       Patient/Caregiver Sources of Income/Employment: Patient is not employed at this time due to his frequent treatment needs related to his ALL.   Patient's mom, Mervat, is a tele nurse and has started a short term part time casual position at [employer] in [place]. Patient's Dad, Dangelo, owns his own commercial painting business. Since he's the owner he has some flexibility in his schedule and can be away from work regularly. Patient's step mom, Camryn, is a nurse and works at one of the [employer] in the [employer].  Financial Concerns: none at this time. Artemio and his parents were encouraged to reach out if they had any financial concerns in the future.              Patient/Family Psychosocial Considerations:      Mental Health: No mental health issues identified         Chemical Health: No issues identified        Trauma/Loss/Abuse History: No identified issues        Legal Issues: No issues identified              Clinical Assessment and Recommendations:       Patient and family present as well-informed about and prepared for the treatment process. I did not identify any significant barriers to them managing the demands of treatment.          Concerns: none identified at this time.      Education Provided: Transplant process expectations, Caregiver requirements, Caregiver self-care, Financial issues related to transplant, Financial resources/grants available, Common psychosocial stressors pre/post transplant, Hopsital resources available and Social work role        Interventions Provided:   Education and counseling related to psychosocial issues and resources      Follow up planned:  Psychosocial support

## 2020-07-27 NOTE — PROGRESS NOTES
Pt has been afebrile, AVS stable and within parameter. Lung sounds clear. Adequate UOP. No stool, no emesis. Denies nausea or pain. Ate a bit for breakfast this am without issue. Received pre-medication for Kymriah of Benadryl and Tylenol. CAR-T infusion went well; no issues. Frequent monitoring of vital signs following infusion; all remained stable. Post-infusion hydration running at this time.   No further issues at this time.

## 2020-07-27 NOTE — PLAN OF CARE
Artemio remained afebrile, VSS, lungs clear.  No complaints of pain or nausea.  Minimal appetite.  C/O hives this evening, one noted under his mustache, and on an inner thigh.  No c/o itching or irritation, MD notified.  Mom at bedside, Hourly rounding completed, continue with POC

## 2020-07-27 NOTE — PROGRESS NOTES
This is a recent snapshot of the patient's [facility] medical record.  For current drug dose and complete information and questions, call [phone] or In Basket pool, [facility] home infusion ([id number])  CSN Number:  [id number]

## 2020-07-27 NOTE — PROGRESS NOTES
Pediatric BMT Daily Progress Note    Interval Events:  Afebrile. He developed a mild hive-like rash overnight without blood products. No known exacerbating factor. Mild throat pain persists. He'll receive continuous fluids pre/post kymriah infusion today.     Review of Systems: Pertinent positives include those mentioned in interval events. A complete review of systems was performed and is otherwise negative.      Medications:  Please see MAR    Physical Exam:  Temp:  [98.6  F (37  C)-99.7  F (37.6  C)] 99.1  F (37.3  C)  Pulse:  [68-84] 83  Resp:  [16-20] 16  BP: ()/(57-65) 84/57  SpO2:  [99 %] 99 %  I/O last 3 completed shifts:  In: 4103.6 [I.V.:2600]  Out: 4075 [Urine:3875; Stool:200]    GEN: Lying in bed, no acute distress, pale.   HEENT: Full head of hair, normocephalic, sclerae clear,  nares patent, OP with mild erythema and no visible lesions.  CARD: RRR, normal S1/S2 without murmur. Cap refill < 2 sec  RESP: Lungs CTA bilaterally without adventitious lung sounds  ABD: Soft NT, ND, no organomegaly.  EXTREM: warm and well perfused.   SKIN: Pale throughout, clear without erythema, rash or lesions.  ACCESS: CVC and port    Labs:  Labs reviewed, pertinent findings WBC 0.2, Hb 8.8, platelets 21,000. BUN 18, creat 0.57.    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 CD19 positive (dim).      Isael completed non-myleoablative chemotherapy prep as an outpatient. He was admitted for fever on 7/24, blood cultures no growth to date.  Kymriah infusion today. Continues to complain of throat pain but not using oxycodone. Appetite minimal, nutritional support with TPN     ALL and BMT: High risk B cell ALL (CNS negative) + JAK2 activation.  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  - Completed apheresis collection in the [facility] (6/2020).   - He has been receiving bridging chemotherapy with oral 6MP, methotrexate and ruxolitinib. All oral chemotherapy stopped on 7/12.   - BM revealed 75% involvement with leukemia. CSF was negative.   - Outpatient lymphodepleting chemotherapy per [id number] with cytoxan and fludarabine 7/21-7/24 completed as an outpatient    Kymriah infusion 7/27   - Pre-meds of Tylenol and Benadryl   - D5% 1/2NS 2 hours prior to Kymriah infusion continue 30 minute during infusion and 2.5 hours post   - No steroid post kymriah     BMT Daily CARTOX Note (complete days 0-14 and with any subsequent CRS/neurotoxicity)    Date: July 27, 2020    Artemio Nino ([id number]) is a 20 year old year old male who received infusion on 7/27/2020, currently day 0 of CAR-T cell therapy Kymriah    Vital Signs: BP 95/68   Pulse 68   Temp 99.1  F (37.3  C) (Oral)   Resp 18   Wt 59.8 kg (131 lb 14.4 oz)   SpO2 100%   BMI 19.43 kg/m      Organ CAR-T toxicity:    Cardiac: None   Pulmonary: None   Renal: None   Liver: None   Coagulopathy: None   Neurologic: None   Immune: None    Overall CRS grade: Grade 0    CRS Parameter Grade 1 Grade 2 Grade 3 Grade 4   Fever* Tm >= 100.4 degrees F Tm >= 100.4 degrees F Tm >= 100.4 degrees F Tm >= to 100.4 degrees F       With Either:  Hypotension None Responsive to Fluids Requiring 1 vasopressor (w/ or w/o vasopressin) Requiring multiple vasopressors (excluding vasopressin)     And/or  Hypoxia None Low-flow nasal cannula or blow-by High-flow nasal cannula, face mask, non-rebreather mask, or Venturi mask Requiring positive pressure (CPAP, BiPAP intubation and mechanical ventilation)       * Definition of High Dose Pressors for Grade 4  Vasopressor Duration >= 3 hours   Norepinephrine monotherapy >= 20 mcg/kg/minute   Dopamine monotherapy >= 10  mcg/kg/minute   Phenylephrine monotherapy >= 200 mcg/minute   Epinephrine monotherapy >= 10 mcg/minute   If on vasopressin + another agent High dose of vasopressin + norepi equivalent (using VASST formula below) is >= 10 mcg/minute   If on combination vasopressors, not including vasopressin Norepi equivalent of > 20 mcg/minute (using VASST formula below)     VASST formula: Norepi equivalent dose = [NE (mcg/min)] + [dopamine (mcg/min) / 2] + [epinephrine (mcg/min)] + [phenylephrine (mcg/min) / 10]    ICE Assessment  Orientation to year, month, city, hospital: 4 points  Name 3 objects (e.g., point to clock, pen, button): 3 points  Following commands: (e.g., Show me 2 fingers): 1 point   Write a standard sentence (e.g., The pickett jumped over the log): 1 point   Count backwards from 100 by ten: 1 point  Total points: 10: No impairment    CRS Summary   Does patient have CRS? No  Current CRS stgstrstastdstest:st st1st What is the maximum severity to date: 0  What therapy was given: 0  Has CRS grade changed? N/A  Has CRS resolved? N/A    Neurotoxicity Summary  Does patient have neurotoxicity? No  Current Neurotoxicity score (0-10): 0  What is the maximum severity to date: 0  What therapy was given: N/A  Has neurotoxicity resolved? N/A    FEN/Renal:   # Risk for malnutrition: Appetite remains decreased  - Continue nutritional support with TPN/IL. Ate breakfast this morning.   - Age appropriate diet   - Monitor nutritional intake     # Risk for electrolyte abnormalities:  - Check daily electrolytes     # Risk for renal dysfunction/risk for ELIZ secondary to potential tumor lysis with preparatory chemotherapy.: GFR 133ml/min  - Daily electrolyte monitoring  - Monitor daily weights     # Risk for TLS: Uric Acid 1.3 today.  - Continue Allopurinol TID     Pulmonary:  # Risk for pulmonary insufficiency: No current concerns. Normal chest and sinus CTs during work-up.      Cardiovascular:  Aaron-Parkinson-White Syndrome: diagnosed upon syncope in  02/2018. Asymptomatic, no interventions to date. No current concerns.  - EKG 7/15 revealed WPW with normal sinus rhythm.   - Echocardiogram 7/15 showed LVEF of 62%  - Cardiology to decide if they want to assess him.     Heme:   # Pancytopenia secondary to chemotherapy:  - Transfuse for hemoglobin < 7, platelets < 10,000   -  Mild hives noted at end of pRBC transfusion this 7/25. Premedicate with Tylenol and Benadryl for pRBC transfusions.   - Recent history of significant hives with platelet transfusions. Was being premedicated with hydrocortisone, tylenol, and benadryl for platelets. However, no hydrocortisone premedication ordered now given CAR-T.   - No GCSF per protocol     Infectious Disease:  # Febrile Neutropenia: Admitted for fever, continue Cefepime and follow blood cultures closely  - Intermittent decreases in BPs 7/26, but overall hemodynamically stable and improved this morning   - Covid negative 7/17 and 7/24     # Risk for infection given immunocompromised status:    Active: See above  Prophylaxis:                                                                      - Viral prophylaxis: HD Valtrex continue through Day +30  - Fungal prophylaxis: Micafungin continue through Day +30. History of photoxic drug eruption with voriconazole, avoid use.   - Bacterial prophylaxis: Levo on hold, continue cefepime IV neutropenic. Fevers maybe related to his leukemia   - PCP prophylaxis: Bactrim to begin day +28      GI:   # Nausea:  - Scheduled medications: Kytril BID - discontinued per patient request 7/26  - PRN medications: Benadryl PRN     # Acid reflux:  Protonix once daily. Continue TUMS prn.     # Transaminitis:  Possibly related to recent chemotherapy. May warrant RUQ US to further assess persistent transamnitis.    -Downtrending. , AST 24 today.     # Abdominal pain and diarrhea: One episode overnight 7/22  - Consider stool collection if frequency increases     Neuro:  # Mucositis/pain: Throat  pain persists  - Covid negative 7/17 and 7/24  - Oxycodone PRN available  - Tramadol prn     Oral Hygiene:   # Receding gums, risk for infection  - Continue chlorhexidine mouth wash for symptomatic control, pain now improved     Derm:  No current rash. He is no longer using creams.     Fertility:  Sperm collected pre chemotherapy for fertility preservation      Discharge Considerations: Expected lengths of hospitalization for patients with complications from stem cell transplant vary based on the complication(s) and severity(ies). A typical stay is 7 days.     The above plan of care was developed by and communicated to me by the Pediatric BMT attending physician, MD Candido Jacinto PA-C  Pediatric Blood and Marrow Transplant Program  [facility]       BMT Attending Note:    Artemio was admitted perviously with a fever, but has not had positive cultures. We have kept him in the hospital for his immunotherapy (infusion today), as there is concern that due to his disease burden he may experience significant toxicity.  His mouth/throat pain has improved, but he continues to take little orally. He remains neutropenic.  We will maintain his antibiotics and TPN.      I have reviewed the vital signs, medications and lab results.  I assisted in formulating a plan, which was instituted by the BMT team. The total amount of time spent in the admission and care of Artemio Nino today was >40 minutes, at least 50% of which was counseling and coordination of care.     Candido Joshua MD  Professor, Dept. Of Pediatrics  Division of Blood and Marrow Transplantation       Assessment: Cell Product Infusion     Artemio received his CAR T therapy this AM. The consent previously obtained. I was present for the infusion and was available in the facility following this. He tolerated it well.  The volume of the kymriah product was quite low; he received a flush during and  following the infusion. His mother was not present when the cells were ready, and Artemio declined to call her at the time of the infusion. However, she was quite upset afterwards at not being present.      Complications: None    Signature:  Candido Joshua MD          Patient Active Problem List   Diagnosis     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)     Aaron-Parkinson-White (WPW) syndrome     Bone marrow transplant candidate     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)     At risk for infection     S/P allogeneic bone marrow transplant (H)     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

## 2020-07-27 NOTE — CONSULTS
Laboratory Medicine and Pathology  Transfusion Medicine- Transfusion Reaction    Artemio Nino MRN# [id number]   YOB: 2000 Age: 20 year old   Date of Reaction: 2020       Transfusion Reaction Evaluation   Impression  - Minor allergic transfusion reaction    Recommendation    1. Transfuse as needed.    ----------------------------------    History  Artemio Ninois a 20 year old male with a past medical history of sibling allogeneic bone marrow transplantation for B-cell ALL with relapse in 2020 who underwent CAR-T infusion.    He was transfused two units of PRBCs on 2020 (units# [id number] and [id number]). At the end of the transfusion mild hives were noted. He was premedicated with tylenol and benadryl (hydrocortisone was held due to CAR-T). At the time he was experiencing febrile neutropenia.    Reported Symptoms  Hives    Blood Bank Investigation  Product Type: PRBCs  Unit Number: [id number] and [id number]  Amount Remainin mL, 0 mL  Post-Transfusion Clerical Check: Correct  ABO/Rh: The unit types were O-, O-, and the patient was A-. The units were compatible.    CDC Hemovigilance  Case Definition: Definitive  Severity: Non-severe  Imputability: Definite    Tony Baker MD  2020 11:46 AM  Transfusion Medicine Fellow  Pager: [phone]        I, Pato Quintanilla MD, have reviewed the patient's records and data. I have discussed this case with the transfusion medicine fellow, Tony Baker MD.  I have edited this note, and the findings and recommendations documented in the note reflect my medical assessment of the reported transfusion reaction.   This meets the definition of a non-severe, allergic transfusion reaction of definite imputability. No testing is pending at this time and this report is final.   Recommendation: Transfuse as needed.    Pato Quintanilla MD  Transfusion Medicine Attending  Laboratory Medicine & Pathology  Pager:  [phone]

## 2020-07-28 LAB
ALBUMIN SERPL-MCNC: 3 G/DL (ref 3.4–5)
ALP SERPL-CCNC: 69 U/L (ref 40–150)
ALT SERPL W P-5'-P-CCNC: 83 U/L (ref 0–70)
ANION GAP SERPL CALCULATED.3IONS-SCNC: 3 MMOL/L (ref 3–14)
ANION GAP SERPL CALCULATED.3IONS-SCNC: 5 MMOL/L (ref 3–14)
AST SERPL W P-5'-P-CCNC: 23 U/L (ref 0–45)
BILIRUB SERPL-MCNC: 1 MG/DL (ref 0.2–1.3)
BUN SERPL-MCNC: 17 MG/DL (ref 7–30)
BUN SERPL-MCNC: 19 MG/DL (ref 7–30)
CA-I BLD-MCNC: 4.7 MG/DL (ref 4.4–5.2)
CALCIUM SERPL-MCNC: 8.1 MG/DL (ref 8.5–10.1)
CALCIUM SERPL-MCNC: 8.5 MG/DL (ref 8.5–10.1)
CHLORIDE SERPL-SCNC: 107 MMOL/L (ref 94–109)
CHLORIDE SERPL-SCNC: 110 MMOL/L (ref 94–109)
CO2 SERPL-SCNC: 25 MMOL/L (ref 20–32)
CO2 SERPL-SCNC: 26 MMOL/L (ref 20–32)
CREAT SERPL-MCNC: 0.53 MG/DL (ref 0.66–1.25)
CREAT SERPL-MCNC: 0.63 MG/DL (ref 0.66–1.25)
DIFFERENTIAL METHOD BLD: ABNORMAL
ERYTHROCYTE [DISTWIDTH] IN BLOOD BY AUTOMATED COUNT: 13.6 % (ref 10–15)
GFR SERPL CREATININE-BSD FRML MDRD: >90 ML/MIN/{1.73_M2}
GFR SERPL CREATININE-BSD FRML MDRD: >90 ML/MIN/{1.73_M2}
GLUCOSE SERPL-MCNC: 124 MG/DL (ref 70–99)
GLUCOSE SERPL-MCNC: 92 MG/DL (ref 70–99)
HCT VFR BLD AUTO: 24.3 % (ref 40–53)
HGB BLD-MCNC: 8.3 G/DL (ref 13.3–17.7)
MAGNESIUM SERPL-MCNC: 1.8 MG/DL (ref 1.6–2.3)
MCH RBC QN AUTO: 27.9 PG (ref 26.5–33)
MCHC RBC AUTO-ENTMCNC: 34.2 G/DL (ref 31.5–36.5)
MCV RBC AUTO: 82 FL (ref 78–100)
PHOSPHATE SERPL-MCNC: 3.7 MG/DL (ref 2.5–4.5)
PHOSPHATE SERPL-MCNC: 3.9 MG/DL (ref 2.5–4.5)
PLATELET # BLD AUTO: 11 10E9/L (ref 150–450)
POTASSIUM SERPL-SCNC: 3.6 MMOL/L (ref 3.4–5.3)
POTASSIUM SERPL-SCNC: 3.8 MMOL/L (ref 3.4–5.3)
PROT SERPL-MCNC: 6.5 G/DL (ref 6.8–8.8)
RBC # BLD AUTO: 2.97 10E12/L (ref 4.4–5.9)
SODIUM SERPL-SCNC: 138 MMOL/L (ref 133–144)
SODIUM SERPL-SCNC: 138 MMOL/L (ref 133–144)
URATE SERPL-MCNC: 1.1 MG/DL (ref 3.5–7.2)
URATE SERPL-MCNC: 1.5 MG/DL (ref 3.5–7.2)
WBC # BLD AUTO: 0.2 10E9/L (ref 4–11)

## 2020-07-28 PROCEDURE — 87103 BLOOD FUNGUS CULTURE: CPT | Performed by: STUDENT IN AN ORGANIZED HEALTH CARE EDUCATION/TRAINING PROGRAM

## 2020-07-28 PROCEDURE — 80048 BASIC METABOLIC PNL TOTAL CA: CPT | Performed by: PHYSICIAN ASSISTANT

## 2020-07-28 PROCEDURE — 25800030 ZZH RX IP 258 OP 636: Performed by: PEDIATRICS

## 2020-07-28 PROCEDURE — 25000125 ZZHC RX 250: Performed by: PEDIATRICS

## 2020-07-28 PROCEDURE — 25000128 H RX IP 250 OP 636: Performed by: STUDENT IN AN ORGANIZED HEALTH CARE EDUCATION/TRAINING PROGRAM

## 2020-07-28 PROCEDURE — 83735 ASSAY OF MAGNESIUM: CPT | Performed by: STUDENT IN AN ORGANIZED HEALTH CARE EDUCATION/TRAINING PROGRAM

## 2020-07-28 PROCEDURE — 87040 BLOOD CULTURE FOR BACTERIA: CPT | Performed by: STUDENT IN AN ORGANIZED HEALTH CARE EDUCATION/TRAINING PROGRAM

## 2020-07-28 PROCEDURE — 25000128 H RX IP 250 OP 636: Performed by: PEDIATRICS

## 2020-07-28 PROCEDURE — 25000132 ZZH RX MED GY IP 250 OP 250 PS 637: Performed by: PEDIATRICS

## 2020-07-28 PROCEDURE — 25000132 ZZH RX MED GY IP 250 OP 250 PS 637: Performed by: STUDENT IN AN ORGANIZED HEALTH CARE EDUCATION/TRAINING PROGRAM

## 2020-07-28 PROCEDURE — 20600000 ZZH R&B BMT

## 2020-07-28 PROCEDURE — 82330 ASSAY OF CALCIUM: CPT | Performed by: PHYSICIAN ASSISTANT

## 2020-07-28 PROCEDURE — 84550 ASSAY OF BLOOD/URIC ACID: CPT | Performed by: STUDENT IN AN ORGANIZED HEALTH CARE EDUCATION/TRAINING PROGRAM

## 2020-07-28 PROCEDURE — 84100 ASSAY OF PHOSPHORUS: CPT | Performed by: PHYSICIAN ASSISTANT

## 2020-07-28 PROCEDURE — 84100 ASSAY OF PHOSPHORUS: CPT | Performed by: STUDENT IN AN ORGANIZED HEALTH CARE EDUCATION/TRAINING PROGRAM

## 2020-07-28 PROCEDURE — 84550 ASSAY OF BLOOD/URIC ACID: CPT | Performed by: PHYSICIAN ASSISTANT

## 2020-07-28 PROCEDURE — 85027 COMPLETE CBC AUTOMATED: CPT

## 2020-07-28 PROCEDURE — 25800030 ZZH RX IP 258 OP 636: Performed by: PHYSICIAN ASSISTANT

## 2020-07-28 PROCEDURE — 80053 COMPREHEN METABOLIC PANEL: CPT | Performed by: STUDENT IN AN ORGANIZED HEALTH CARE EDUCATION/TRAINING PROGRAM

## 2020-07-28 RX ORDER — VANCOMYCIN HYDROCHLORIDE 1 G/200ML
15 INJECTION, SOLUTION INTRAVENOUS EVERY 12 HOURS
Status: DISCONTINUED | OUTPATIENT
Start: 2020-07-28 | End: 2020-07-30

## 2020-07-28 RX ADMIN — MICAFUNGIN SODIUM 50 MG: 10 INJECTION, POWDER, LYOPHILIZED, FOR SOLUTION INTRAVENOUS at 19:40

## 2020-07-28 RX ADMIN — Medication 150 MG: at 08:52

## 2020-07-28 RX ADMIN — SODIUM CHLORIDE: 900 INJECTION INTRAVENOUS at 16:10

## 2020-07-28 RX ADMIN — HEPARIN, PORCINE (PF) 10 UNIT/ML INTRAVENOUS SYRINGE 10 ML: at 14:20

## 2020-07-28 RX ADMIN — PANTOPRAZOLE SODIUM 40 MG: 20 TABLET, DELAYED RELEASE ORAL at 08:52

## 2020-07-28 RX ADMIN — Medication 150 MG: at 14:20

## 2020-07-28 RX ADMIN — VANCOMYCIN HYDROCHLORIDE 1000 MG: 1 INJECTION, SOLUTION INTRAVENOUS at 13:03

## 2020-07-28 RX ADMIN — CEFEPIME 2000 MG: 2 INJECTION, POWDER, FOR SOLUTION INTRAVENOUS at 16:14

## 2020-07-28 RX ADMIN — I.V. FAT EMULSION 250 ML: 20 EMULSION INTRAVENOUS at 19:38

## 2020-07-28 RX ADMIN — VANCOMYCIN HYDROCHLORIDE 1000 MG: 1 INJECTION, SOLUTION INTRAVENOUS at 01:26

## 2020-07-28 RX ADMIN — SODIUM CHLORIDE: 900 INJECTION INTRAVENOUS at 16:11

## 2020-07-28 RX ADMIN — VALACYCLOVIR HYDROCHLORIDE 1000 MG: 500 TABLET, FILM COATED ORAL at 14:20

## 2020-07-28 RX ADMIN — CEFEPIME 2000 MG: 2 INJECTION, POWDER, FOR SOLUTION INTRAVENOUS at 08:53

## 2020-07-28 RX ADMIN — Medication 150 MG: at 19:41

## 2020-07-28 RX ADMIN — CEFEPIME 2000 MG: 2 INJECTION, POWDER, FOR SOLUTION INTRAVENOUS at 00:43

## 2020-07-28 RX ADMIN — PHYTONADIONE: 1 INJECTION, EMULSION INTRAMUSCULAR; INTRAVENOUS; SUBCUTANEOUS at 19:38

## 2020-07-28 RX ADMIN — SODIUM CHLORIDE: 900 INJECTION INTRAVENOUS at 02:37

## 2020-07-28 RX ADMIN — CEFEPIME 2000 MG: 2 INJECTION, POWDER, FOR SOLUTION INTRAVENOUS at 23:55

## 2020-07-28 RX ADMIN — VALACYCLOVIR HYDROCHLORIDE 1000 MG: 500 TABLET, FILM COATED ORAL at 19:41

## 2020-07-28 RX ADMIN — ACETAMINOPHEN 650 MG: 325 TABLET, FILM COATED ORAL at 01:05

## 2020-07-28 RX ADMIN — VALACYCLOVIR HYDROCHLORIDE 1000 MG: 500 TABLET, FILM COATED ORAL at 08:52

## 2020-07-28 NOTE — PHARMACY-VANCOMYCIN DOSING SERVICE
Pharmacy Vancomycin Initial Note  Date of Service 2020  Patient's  2000  20 year old, male    Indication: Febrile Neutropenia    Current estimated CrCl = Estimated Creatinine Clearance: 171.9 mL/min (A) (based on SCr of 0.57 mg/dL (L)).    Creatinine for last 3 days  2020:  1:07 AM Creatinine 0.51 mg/dL  2020: 12:02 AM Creatinine 0.57 mg/dL    Recent Vancomycin Level(s) for last 3 days  No results found for requested labs within last 72 hours.      Vancomycin IV Administrations (past 72 hours)      No vancomycin orders with administrations in past 72 hours.                Nephrotoxins and other renal medications (From now, onward)    Start     Dose/Rate Route Frequency Ordered Stop    20 0130  vancomycin (VANCOCIN) 1000 mg in dextrose 5% 200 mL PREMIX      15 mg/kg × 59.8 kg  200 mL/hr over 1 Hours Intravenous EVERY 12 HOURS 20 0108      20 1545  valACYclovir (VALTREX) tablet 1,000 mg      1,000 mg Oral 3 TIMES DAILY 20 1543            Contrast Orders - past 72 hours (72h ago, onward)    None                Plan:  1.  Start vancomycin  1000 mg IV q12h.   2.  Goal Trough Level: 10-15 mg/L   3.  Pharmacy will check trough levels as appropriate in 1-3 Days.    4. Serum creatinine levels will be ordered daily for the first week of therapy and at least twice weekly for subsequent weeks.    5. Allen method utilized to dose vancomycin therapy: Method 2    Flaquita Cabezas RPH

## 2020-07-28 NOTE — PLAN OF CARE
Isael has been afebrile. Vitals within set parameters. Lung sounds clear on room air. Denies pain or nausea. No neuro changes and BP's stable. Good urine output. Will continue to monitor and notify MD of any changes.

## 2020-07-28 NOTE — PROGRESS NOTES
Pediatric BMT Daily Progress Note    Interval Events:  Tmax 102.1F. Blood cultures drawn and vancomycin added. Hemodynamically stable.  Mild throat pain persists.  Kymriah infusion well tolerated on 7/27. Mother very upset and verbally abusive to staff because kymriah infused before she arrived.     Review of Systems: Pertinent positives include those mentioned in interval events. A complete review of systems was performed and is otherwise negative.      Medications:  Please see MAR    Physical Exam:  Temp:  [98.6  F (37  C)-102.1  F (38.9  C)] 99.3  F (37.4  C)  Pulse:  [] 102  Heart Rate:  [78-92] 81  Resp:  [18-24] 24  BP: ()/(49-68) 92/58  SpO2:  [99 %-100 %] 100 %  I/O last 3 completed shifts:  In: 3560.6 [I.V.:2095; Blood:16]  Out: 3055 [Urine:3055]  GEN: Lying in bed, no acute distress, pale. Mother present.   HEENT: Full head of hair, normocephalic, sclerae clear,  nares patent, OP with mild erythema and no visible lesions.  CARD: RRR, normal S1/S2 without murmur.   RESP: Lungs CTA bilaterally without adventitious lung sounds  ABD: Soft NT, ND, no organomegaly.  EXTREM: warm and well perfused.   SKIN: Pale throughout, clear without erythema, rash or lesions.  ACCESS: CVC and port    Labs:  Labs reviewed, pertinent findings WBC 0.2, Hb 8.3, platelets 11,000. BUN 17, creat 0.53.    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 CD19 positive (dim).      Isael completed non-myleoablative chemotherapy prep as an outpatient. He was admitted for fever on 7/24, blood cultures no growth to date.  Kymriah infusion 7/27 uneventful. Fever 7/28. Continues to complain of throat pain but not using oxycodone. Appetite minimal, nutritional support with TPN     ALL and BMT: High risk B cell ALL (CNS negative) + JAK2  activation.  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  - Com[facility] apheresis collection in [facility] (6/2020).   - He has been receiving bridging chemotherapy with oral 6MP, methotrexate and ruxolitinib. All oral chemotherapy stopped on 7/12.   - BM revealed 75% involvement with leukemia. CSF was negative.   - Outpatient lymphodepleting chemotherapy per [id number] with cytoxan and fludarabine 7/21-7/24 completed as an outpatient    Kymriah infusion 7/27   - Pre-meds of Tylenol and Benadryl   - D5% 1/2NS 2 hours prior to Kymriah infusion continue 30 minute during infusion and 6 hours post   - No steroid post kymriah unless needed for severe blood product transfusion reaction.      BMT Daily CARTOX Note (complete days 0-14 and with any subsequent CRS/neurotoxicity)    Date: July 28, 2020    Artemio Nino ([id number]) is a 20 year old year old male who received infusion on 7/27/2020, currently day +1 of CAR-T cell therapy Kymriah    Vital Signs: BP 92/58   Pulse 102   Temp 99.3  F (37.4  C) (Axillary)   Resp 24   Wt 59.8 kg (131 lb 14.4 oz)   SpO2 100%   BMI 19.43 kg/m      Organ CAR-T toxicity:    Cardiac: None   Pulmonary: None   Renal: None   Liver: None   Coagulopathy: None   Neurologic: None   Immune: None    Overall CRS grade: Grade 0    CRS Parameter Grade 1 Grade 2 Grade 3 Grade 4   Fever* Tm >= 100.4 degrees F Tm >= 100.4 degrees F Tm >= 100.4 degrees F Tm >= to 100.4 degrees F       With Either:  Hypotension None Responsive to Fluids Requiring 1 vasopressor (w/ or w/o vasopressin) Requiring multiple vasopressors (excluding vasopressin)     And/or  Hypoxia None Low-flow nasal cannula or blow-by High-flow nasal cannula, face mask, non-rebreather mask, or Venturi mask Requiring positive pressure (CPAP, BiPAP intubation and mechanical  ventilation)       * Definition of High Dose Pressors for Grade 4  Vasopressor Duration >= 3 hours   Norepinephrine monotherapy >= 20 mcg/kg/minute   Dopamine monotherapy >= 10 mcg/kg/minute   Phenylephrine monotherapy >= 200 mcg/minute   Epinephrine monotherapy >= 10 mcg/minute   If on vasopressin + another agent High dose of vasopressin + norepi equivalent (using VASST formula below) is >= 10 mcg/minute   If on combination vasopressors, not including vasopressin Norepi equivalent of > 20 mcg/minute (using VASST formula below)     VASST formula: Norepi equivalent dose = [NE (mcg/min)] + [dopamine (mcg/min) / 2] + [epinephrine (mcg/min)] + [phenylephrine (mcg/min) / 10]    ICE Assessment  Orientation to year, month, city, hospital: 4 points  Name 3 objects (e.g., point to clock, pen, button): 3 points  Following commands: (e.g., Show me 2 fingers): 1 point   Write a standard sentence (e.g., The pickett jumped over the log): 1 point   Count backwards from 100 by ten: 1 point  Total points: 10: No impairment    CRS Summary   Does patient have CRS? Yes (fever)  Current CRS ndgndrndanddndend:nd nd2nd What is the maximum severity to date: 1  What therapy was given: IV antibiotics  Has CRS grade changed? N/A  Has CRS resolved? N/A    Neurotoxicity Summary  Does patient have neurotoxicity? No  Current Neurotoxicity score (0-10): 0  What is the maximum severity to date: 0  What therapy was given: N/A  Has neurotoxicity resolved? N/A    FEN/Renal:   # Risk for malnutrition: Appetite remains decreased  - Continue nutritional support with TPN/IL x 24h  - Age appropriate diet   - Monitor nutritional intake     # Risk for electrolyte abnormalities:  - Check daily electrolytes     # Risk for renal dysfunction/risk for ELIZ secondary to potential tumor lysis with preparatory chemotherapy.: GFR 133ml/min  - Daily electrolyte monitoring  - Monitor daily weights     # Risk for TLS: Uric Acid 1.5 today.  - Continue Allopurinol TID     Pulmonary:  # Risk  for pulmonary insufficiency: No current concerns. Normal chest and sinus CTs during work-up.      Cardiovascular:  Aaron-Parkinson-White Syndrome: diagnosed upon syncope in 02/2018. Asymptomatic, no interventions to date. No current concerns.  - EKG 7/15 revealed WPW with normal sinus rhythm.   - Echocardiogram 7/15 showed LVEF of 62%  - Cardiology to decide if they want to assess him.     Heme:   # Pancytopenia secondary to chemotherapy:  - Transfuse for hemoglobin < 7, platelets < 10,000   -  Mild hives noted at end of pRBC transfusion this 7/25. Premedicate with Tylenol and Benadryl for pRBC transfusions.   - Recent history of significant hives with platelet transfusions. Was being premedicated with hydrocortisone, tylenol, and benadryl for platelets. However, no hydrocortisone premedication ordered now given CAR-T. If severe reaction then ok to give steroids per Dr. Zavala.   - No GCSF per protocol     Infectious Disease:  # Febrile Neutropenia: Admitted for fever. Tmax 102.1F overnight. Added vancomycin.  Continue Cefepime and follow blood cultures closely  - Intermittent decreases in BPs 7/26, but overall hemodynamically stable and improved this morning   - Covid negative 7/17 and 7/24     # Risk for infection given immunocompromised status:    Active: See above  Prophylaxis:                                                                      - Viral prophylaxis: HD Valtrex continue through Day +30  - Fungal prophylaxis: Micafungin continue through Day +30. History of photoxic drug eruption with voriconazole, avoid use.   - Bacterial prophylaxis: Levo on hold, continue cefepime IV neutropenic. Fevers maybe related to his leukemia   - PCP prophylaxis: Bactrim to begin day +28      GI:   # Nausea:  - Scheduled medications: Kytril BID - discontinued per patient request 7/26  - PRN medications: Benadryl PRN     # Acid reflux:  Protonix once daily. Continue TUMS prn.     # Transaminitis:  Possibly related to  recent chemotherapy. May warrant RUQ US to further assess persistent transamnitis.    - Downtrending. ALT 83, AST 23 today.     # Abdominal pain and diarrhea: One episode overnight 7/22  - Consider stool collection if frequency increases     Neuro:  # Mucositis/pain: Throat pain persists  - Oxycodone PRN available  - Tramadol prn  - Covid negative 7/17 and 7/24     Oral Hygiene:   # Receding gums, risk for infection  - Continue chlorhexidine mouth wash for symptomatic control, pain now improved     Derm:  No current rash. He is no longer using creams.     Fertility:  Sperm collected pre chemotherapy for fertility preservation      Discharge Considerations: Expected lengths of hospitalization for patients with complications from stem cell transplant vary based on the complication(s) and severity(ies). A typical stay is 7 days.     The above plan of care was developed by and communicated to me by the Pediatric BMT attending physician, MD Candido Jacinto PA-C  Pediatric Blood and Marrow Transplant Program  [facility] and [facility]       BMT Attending Note:    Artemio has continued to be intermittently febrile. There are no positive cultures. We are continuing to watch him closely for a clinical change following the infusion of the CAR T cells yesterday. His mouth/throat pain has continued, but has not significantly worsened. He continues to take little orally. He remains neutropenic.  We will maintain his antibiotics and TPN.      I have reviewed the vital signs, medications and lab results.  I assisted in formulating a plan, which was instituted by the BMT team. The total amount of time spent in the admission and care of Artemio Nino today was >40 minutes, at least 50% of which was counseling and coordination of care.     Candido Joshua MD  Professor, Dept. Of Pediatrics  Division of Blood and Marrow Transplantation           Patient Active Problem List    Diagnosis     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)     Aaron-Parkinson-White (WPW) syndrome     Bone marrow transplant candidate     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)     At risk for infection     S/P allogeneic bone marrow transplant (H)     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

## 2020-07-28 NOTE — PLAN OF CARE
Febrile, maximum temp 102.1. MD notified, who came and assessed the pt. Cultures drawn. PRN Tylenol given. OVSS. Sats well on RA. No complaint of pain or nausea. Neuro appropriate, interacting with RN.  Lung sounds diminished overall but clear. Voids well. No stool. No replacements needed overnight. Continue POC.

## 2020-07-29 LAB
ALBUMIN SERPL-MCNC: 3.1 G/DL (ref 3.4–5)
ALP SERPL-CCNC: 69 U/L (ref 40–150)
ALT SERPL W P-5'-P-CCNC: 80 U/L (ref 0–70)
ANION GAP SERPL CALCULATED.3IONS-SCNC: 3 MMOL/L (ref 3–14)
ANION GAP SERPL CALCULATED.3IONS-SCNC: 7 MMOL/L (ref 3–14)
AST SERPL W P-5'-P-CCNC: 19 U/L (ref 0–45)
BILIRUB SERPL-MCNC: 0.9 MG/DL (ref 0.2–1.3)
BUN SERPL-MCNC: 17 MG/DL (ref 7–30)
BUN SERPL-MCNC: 19 MG/DL (ref 7–30)
CA-I BLD-MCNC: 4.8 MG/DL (ref 4.4–5.2)
CA-I BLD-MCNC: 4.9 MG/DL (ref 4.4–5.2)
CALCIUM SERPL-MCNC: 8.4 MG/DL (ref 8.5–10.1)
CALCIUM SERPL-MCNC: 8.8 MG/DL (ref 8.5–10.1)
CHLORIDE SERPL-SCNC: 107 MMOL/L (ref 94–109)
CHLORIDE SERPL-SCNC: 107 MMOL/L (ref 94–109)
CO2 SERPL-SCNC: 25 MMOL/L (ref 20–32)
CO2 SERPL-SCNC: 28 MMOL/L (ref 20–32)
CREAT SERPL-MCNC: 0.58 MG/DL (ref 0.66–1.25)
CREAT SERPL-MCNC: 0.6 MG/DL (ref 0.66–1.25)
DIFFERENTIAL METHOD BLD: ABNORMAL
ERYTHROCYTE [DISTWIDTH] IN BLOOD BY AUTOMATED COUNT: 13.5 % (ref 10–15)
GFR SERPL CREATININE-BSD FRML MDRD: >90 ML/MIN/{1.73_M2}
GFR SERPL CREATININE-BSD FRML MDRD: >90 ML/MIN/{1.73_M2}
GLUCOSE SERPL-MCNC: 104 MG/DL (ref 70–99)
GLUCOSE SERPL-MCNC: 104 MG/DL (ref 70–99)
HCT VFR BLD AUTO: 26 % (ref 40–53)
HGB BLD-MCNC: 8.8 G/DL (ref 13.3–17.7)
MAGNESIUM SERPL-MCNC: 1.9 MG/DL (ref 1.6–2.3)
MCH RBC QN AUTO: 28.3 PG (ref 26.5–33)
MCHC RBC AUTO-ENTMCNC: 33.8 G/DL (ref 31.5–36.5)
MCV RBC AUTO: 84 FL (ref 78–100)
PHOSPHATE SERPL-MCNC: 3.6 MG/DL (ref 2.5–4.5)
PHOSPHATE SERPL-MCNC: 4.1 MG/DL (ref 2.5–4.5)
PLATELET # BLD AUTO: 13 10E9/L (ref 150–450)
POTASSIUM SERPL-SCNC: 3.6 MMOL/L (ref 3.4–5.3)
POTASSIUM SERPL-SCNC: 3.7 MMOL/L (ref 3.4–5.3)
PROT SERPL-MCNC: 6.6 G/DL (ref 6.8–8.8)
RBC # BLD AUTO: 3.11 10E12/L (ref 4.4–5.9)
SODIUM SERPL-SCNC: 138 MMOL/L (ref 133–144)
SODIUM SERPL-SCNC: 139 MMOL/L (ref 133–144)
URATE SERPL-MCNC: 1 MG/DL (ref 3.5–7.2)
URATE SERPL-MCNC: 1.3 MG/DL (ref 3.5–7.2)
WBC # BLD AUTO: 0.2 10E9/L (ref 4–11)

## 2020-07-29 PROCEDURE — 82330 ASSAY OF CALCIUM: CPT | Performed by: PHYSICIAN ASSISTANT

## 2020-07-29 PROCEDURE — 25000128 H RX IP 250 OP 636: Performed by: PEDIATRICS

## 2020-07-29 PROCEDURE — 84550 ASSAY OF BLOOD/URIC ACID: CPT | Performed by: PHYSICIAN ASSISTANT

## 2020-07-29 PROCEDURE — 25000132 ZZH RX MED GY IP 250 OP 250 PS 637: Performed by: STUDENT IN AN ORGANIZED HEALTH CARE EDUCATION/TRAINING PROGRAM

## 2020-07-29 PROCEDURE — 84550 ASSAY OF BLOOD/URIC ACID: CPT | Performed by: STUDENT IN AN ORGANIZED HEALTH CARE EDUCATION/TRAINING PROGRAM

## 2020-07-29 PROCEDURE — 84100 ASSAY OF PHOSPHORUS: CPT | Performed by: STUDENT IN AN ORGANIZED HEALTH CARE EDUCATION/TRAINING PROGRAM

## 2020-07-29 PROCEDURE — 25800030 ZZH RX IP 258 OP 636: Performed by: PEDIATRICS

## 2020-07-29 PROCEDURE — 84100 ASSAY OF PHOSPHORUS: CPT | Performed by: PHYSICIAN ASSISTANT

## 2020-07-29 PROCEDURE — 87040 BLOOD CULTURE FOR BACTERIA: CPT | Performed by: STUDENT IN AN ORGANIZED HEALTH CARE EDUCATION/TRAINING PROGRAM

## 2020-07-29 PROCEDURE — 20600000 ZZH R&B BMT

## 2020-07-29 PROCEDURE — 80048 BASIC METABOLIC PNL TOTAL CA: CPT | Performed by: PHYSICIAN ASSISTANT

## 2020-07-29 PROCEDURE — 83735 ASSAY OF MAGNESIUM: CPT | Performed by: STUDENT IN AN ORGANIZED HEALTH CARE EDUCATION/TRAINING PROGRAM

## 2020-07-29 PROCEDURE — 25000125 ZZHC RX 250: Performed by: PEDIATRICS

## 2020-07-29 PROCEDURE — 87103 BLOOD FUNGUS CULTURE: CPT | Performed by: STUDENT IN AN ORGANIZED HEALTH CARE EDUCATION/TRAINING PROGRAM

## 2020-07-29 PROCEDURE — 25000128 H RX IP 250 OP 636: Performed by: STUDENT IN AN ORGANIZED HEALTH CARE EDUCATION/TRAINING PROGRAM

## 2020-07-29 PROCEDURE — 25000132 ZZH RX MED GY IP 250 OP 250 PS 637: Performed by: PEDIATRICS

## 2020-07-29 PROCEDURE — 85027 COMPLETE CBC AUTOMATED: CPT

## 2020-07-29 PROCEDURE — 80053 COMPREHEN METABOLIC PANEL: CPT | Performed by: STUDENT IN AN ORGANIZED HEALTH CARE EDUCATION/TRAINING PROGRAM

## 2020-07-29 RX ADMIN — Medication 150 MG: at 20:08

## 2020-07-29 RX ADMIN — PANTOPRAZOLE SODIUM 40 MG: 20 TABLET, DELAYED RELEASE ORAL at 10:07

## 2020-07-29 RX ADMIN — CEFEPIME 2000 MG: 2 INJECTION, POWDER, FOR SOLUTION INTRAVENOUS at 23:50

## 2020-07-29 RX ADMIN — VANCOMYCIN HYDROCHLORIDE 1000 MG: 1 INJECTION, SOLUTION INTRAVENOUS at 01:26

## 2020-07-29 RX ADMIN — PHYTONADIONE: 1 INJECTION, EMULSION INTRAMUSCULAR; INTRAVENOUS; SUBCUTANEOUS at 20:08

## 2020-07-29 RX ADMIN — ACETAMINOPHEN 650 MG: 325 TABLET, FILM COATED ORAL at 00:10

## 2020-07-29 RX ADMIN — Medication 150 MG: at 10:07

## 2020-07-29 RX ADMIN — VALACYCLOVIR HYDROCHLORIDE 1000 MG: 500 TABLET, FILM COATED ORAL at 14:30

## 2020-07-29 RX ADMIN — I.V. FAT EMULSION 250 ML: 20 EMULSION INTRAVENOUS at 20:08

## 2020-07-29 RX ADMIN — Medication 150 MG: at 14:30

## 2020-07-29 RX ADMIN — MICAFUNGIN SODIUM 50 MG: 10 INJECTION, POWDER, LYOPHILIZED, FOR SOLUTION INTRAVENOUS at 20:08

## 2020-07-29 RX ADMIN — VANCOMYCIN HYDROCHLORIDE 1000 MG: 1 INJECTION, SOLUTION INTRAVENOUS at 13:00

## 2020-07-29 RX ADMIN — CEFEPIME 2000 MG: 2 INJECTION, POWDER, FOR SOLUTION INTRAVENOUS at 16:14

## 2020-07-29 RX ADMIN — VALACYCLOVIR HYDROCHLORIDE 1000 MG: 500 TABLET, FILM COATED ORAL at 10:07

## 2020-07-29 RX ADMIN — VALACYCLOVIR HYDROCHLORIDE 1000 MG: 500 TABLET, FILM COATED ORAL at 20:08

## 2020-07-29 RX ADMIN — CEFEPIME 2000 MG: 2 INJECTION, POWDER, FOR SOLUTION INTRAVENOUS at 08:28

## 2020-07-29 NOTE — PLAN OF CARE
Artemio had a tmax of 99.9, HR in the 60s, OVSS, lungs clear.  No c/o pain or nausea.  Neuro's WNL.  Good UOP.  Plan to continue to monitor, hourly rounding completed,  Continue with POC

## 2020-07-29 NOTE — PROGRESS NOTES
07/29/20 1147   Child Life   Location BMT   Intervention Initial Assessment;Family Support  (Introduced self to patient and mother as CCLS on Unit 4. Pt had previous transplant and is familiar with hospital and resources available.)   Family Support Comment Mom, Mervat, present. Supportive conversation regarding interests, previous experiences and ways to engage in self care and positive coping during hospitalization. Mervat enjoys reading and puzzles, so encouraged her to reach out to [facility] and [facility] and/or [facility] if she has specific requests or needs.   Major Change/Loss/Stressor/Fears medical condition, self   Techniques to [place] with Loss/Stress/Change family presence;diversional activity   Special Interests Patient enjoys anime, watching soccer and also expressed interest in Legos as a way to pass the time (specifically structures, like the [employer] series or [employer] [place]).   Outcomes/Follow Up Continue to Follow/Support;Provided Materials  (Provided transplant gift and sign per hospital protocol.)

## 2020-07-29 NOTE — PLAN OF CARE
Tmax 101.5, cultures drawn and tylenol x1 given with relief. BPs 80-90s/50s. OVSS. Lungs clear, sating well on RA. Neuro WDL. Voiding. No stool overnight. No indications of pain/nausea. No replacements needed overnight. Hourly rounding complete. Will continue to monitor.

## 2020-07-29 NOTE — PLAN OF CARE
Patient afebrile.  Blood pressure occasionally lower but within patient's normal range. Patient up in chair for a few hours today.  No complaints of pain or nausea. Patient had some PO intake today but continues on TPN. Weight down today.

## 2020-07-29 NOTE — PROGRESS NOTES
Pediatric BMT Daily Progress Note    Interval Events:  Tmax 101.5F. Blood cultures remain NGTD. Blood pressures remain intermittently soft since prior to kymriah.  Hemodynamically stable.  Mild throat pain persists.     Review of Systems: Pertinent positives include those mentioned in interval events. A complete review of systems was performed and is otherwise negative.      Medications:  Please see MAR    Physical Exam:  Temp:  [98.6  F (37  C)-101.5  F (38.6  C)] 98.6  F (37  C)  Pulse:  [68-99] 84  Heart Rate:  [69] 69  Resp:  [20-24] 20  BP: ()/(51-70) 88/51  SpO2:  [97 %-100 %] 100 %  I/O last 3 completed shifts:  In: 1939.4 [I.V.:955]  Out: 1725 [Urine:1725]  GEN: Lying in bed, no acute distress, pale. Mother present.   HEENT: Full head of hair, normocephalic, sclerae clear,  nares patent, OP with mild erythema and no visible lesions.  CARD: RRR, normal S1/S2 without murmur.   RESP: Lungs CTA bilaterally without adventitious lung sounds  ABD: Soft NT, ND, no organomegaly.  EXTREM: warm and well perfused.   SKIN: Pale throughout, clear without erythema, rash or lesions.  ACCESS: CVC and port    Labs:  Labs reviewed, pertinent findings WBC 0.2, Hb 8.8, platelets 13,000. BUN 17, creat 0.58.    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 CD19 positive (dim).      Isael completed non-myleoablative chemotherapy prep as an outpatient. He was admitted for fever on 7/24, blood cultures no growth to date.  Kymriah infusion 7/27 uneventful. Febrile. Continues to complain of throat pain but not using oxycodone. Appetite minimal, nutritional support with TPN.  Day +2     ALL and BMT: High risk B cell ALL (CNS negative) + JAK2 activation.  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  - Completed apheresis [facility] in the [facility] (6/2020).   - He has been receiving bridging chemotherapy with oral 6MP, methotrexate and ruxolitinib. All oral chemotherapy stopped on 7/12.   - BM revealed 75% involvement with leukemia. CSF was negative.   - Outpatient lymphodepleting chemotherapy per [id number] with cytoxan and fludarabine 7/21-7/24 completed as an outpatient    Kymriah infusion 7/27   - Pre-meds of Tylenol and Benadryl   - D5% 1/2NS 2 hours prior to Kymriah infusion continue 30 minute during infusion and 6 hours post   - No steroid post kymriah unless needed for severe blood product transfusion reaction.      BMT Daily CARTOX Note (complete days 0-14 and with any subsequent CRS/neurotoxicity)    Date: July 29, 2020    Artemio Nino ([id number]) is a 20 year old year old male who received infusion on 7/27/2020, currently day +2 of CAR-T cell therapy Kymriah    Vital Signs: BP (!) 88/51   Pulse 84   Temp 98.6  F (37  C) (Axillary)   Resp 20   Wt 59.1 kg (130 lb 4.7 oz)   SpO2 100%   BMI 19.19 kg/m      Organ CAR-T toxicity:    Cardiac: hypotension < 100/60   Pulmonary: No   Renal: No   Liver: No   Coagulopathy: No   Neurologic: No   Immune: No    Overall CRS grade Grade 2 (fever and hypotension)    CRS Parameter Grade 1 Grade 2 Grade 3 Grade 4   Fever* Tm >= 100.4 degrees F Tm >= 100.4 degrees F Tm >= 100.4 degrees F Tm >= to 100.4 degrees F       With Either:  Hypotension None Responsive to Fluids Requiring 1 vasopressor (w/ or w/o vasopressin) Requiring multiple vasopressors (excluding vasopressin)     And/or  Hypoxia None Low-flow nasal cannula or blow-by High-flow nasal cannula, face mask, non-rebreather mask, or Venturi mask Requiring positive pressure (CPAP, BiPAP intubation and mechanical ventilation)       * Definition of High Dose Pressors for Grade 4  Vasopressor Duration  >= 3 hours   Norepinephrine monotherapy >= 20 mcg/kg/minute   Dopamine monotherapy >= 10 mcg/kg/minute   Phenylephrine monotherapy >= 200 mcg/minute   Epinephrine monotherapy >= 10 mcg/minute   If on vasopressin + another agent High dose of vasopressin + norepi equivalent (using VASST formula below) is >= 10 mcg/minute   If on combination vasopressors, not including vasopressin Norepi equivalent of > 20 mcg/minute (using VASST formula below)     VASST formula: Norepi equivalent dose = [NE (mcg/min)] + [dopamine (mcg/min) / 2] + [epinephrine (mcg/min)] + [phenylephrine (mcg/min) / 10]    ICE Assessment  Orientation to year, month, city, hospital: 4 points  Name 3 objects (e.g., point to clock, pen, button): 3 points  Following commands: (e.g., Show me 2 fingers): 1 point   Write a standard sentence (e.g., The pickett jumped over the log): 1 point   Count backwards from 100 by ten: 1 point  Total points: 10: No impairment    CRS Summary  Does patient have CRS? Yes, date of onset: 7/28/2020   Current CRS rdgrdrrdarddrderd:rd rd3rd What is the maximum severity to date: Grade 2  What therapy was given: IV antibiotics  Has CRS grade changed? Yes, new grade and date of change: Grade 2 (fever + hypotension), 7/28/2020  Has CRS resolved? No    Neurotoxicity Summary  Does patient have neurotoxicity? No  Current Neurotoxicity score (0-10): 0  What is the maximum severity to date:  0  What therapy was given: N/A  Has neurotoxicity resolved? N/A      FEN/Renal:   # Risk for malnutrition: Appetite remains decreased  - Continue nutritional support with TPN/IL x 24h  - Age appropriate diet   - Monitor nutritional intake     # Risk for electrolyte abnormalities:  - Check daily electrolytes     # Risk for renal dysfunction/risk for ELIZ secondary to potential tumor lysis with preparatory chemotherapy.: GFR 133ml/min  - Daily electrolyte monitoring  - Monitor daily weights     # Risk for TLS: Uric Acid 1.3 today.  - Continue Allopurinol  TID     Pulmonary:  # Risk for pulmonary insufficiency: No current concerns. Normal chest and sinus CTs during work-up.      Cardiovascular:  Aaron-Parkinson-White Syndrome: diagnosed upon syncope in 02/2018. Asymptomatic, no interventions to date. No current concerns.  - EKG 7/15 revealed WPW with normal sinus rhythm.   - Echocardiogram 7/15 showed LVEF of 62%  - Cardiology to decide if they want to assess him.     Heme:   # Pancytopenia secondary to chemotherapy:  - Transfuse for hemoglobin < 7, platelets < 10,000   -  Mild hives noted at end of pRBC transfusion this 7/25. Premedicate with Tylenol and Benadryl for pRBC transfusions.   - Recent history of significant hives with platelet transfusions. Was being premedicated with hydrocortisone, tylenol, and benadryl for platelets. However, no hydrocortisone premedication ordered now given CAR-T. If severe reaction then ok to give steroids per Dr. Zavala.   - No GCSF per protocol     Infectious Disease:  # Febrile Neutropenia: Admitted for fever. Tmax 101.5F overnight. Added vancomycin.  Continue Cefepime and follow blood cultures closely  - Intermittent decreases in BPs 7/26, but overall hemodynamically stable and improved this morning   - Covid negative 7/17 and 7/24     # Risk for infection given immunocompromised status:    Active: See above  Prophylaxis:                                                                      - Viral prophylaxis: HD Valtrex continue through Day +30  - Fungal prophylaxis: Micafungin continue through Day +30. History of photoxic drug eruption with voriconazole, avoid use.   - Bacterial prophylaxis: Levo on hold, continue cefepime IV neutropenic. Fevers maybe related to his leukemia   - PCP prophylaxis: Bactrim to begin day +28      GI:   # Nausea:  - Scheduled medications: Kytril BID - discontinued per patient request 7/26  - PRN medications: Benadryl PRN     # Acid reflux:  Protonix once daily. Continue TUMS prn.     #  Transaminitis:  Possibly related to recent chemotherapy. May warrant RUQ US to further assess persistent transamnitis.    - Downtrending. ALT 80, AST 19 today.     # Abdominal pain and diarrhea: One episode overnight 7/22  - Consider stool collection if frequency increases     Neuro:  # Mucositis/pain: Throat pain persists  - Oxycodone PRN available  - Tramadol prn  - Covid negative 7/17 and 7/24     Oral Hygiene:   # Receding gums, risk for infection  - Continue chlorhexidine mouth wash for symptomatic control, pain now improved     Derm:  No current rash. He is no longer using creams.     Fertility:  Sperm collected pre chemotherapy for fertility preservation      Discharge Considerations: Expected lengths of hospitalization for patients with complications from stem cell transplant vary based on the complication(s) and severity(ies). A typical stay is 7 days.     The above plan of care was developed by and communicated to me by the Pediatric BMT attending physician, MD Candido Jacinto PA-C  Pediatric Blood and Marrow Transplant Program  [facility]       BMT Attending Note:    Artemio has continued to be intermittently febrile, especially in the evening. There are no positive cultures. We are continuing to watch him closely for a clinical change following the infusion of the CAR T cells yesterday. His mouth/throat pain has continued, but has not significantly worsened. He continues to take little orally. He remains neutropenic.  We will maintain his antibiotics and TPN. There is no increase in uric acid or other parameters.     I have reviewed the vital signs, medications and lab results.  I assisted in formulating a plan, which was instituted by the BMT team. The total amount of time spent in the admission and care of Artemio Nino today was >40 minutes, at least 50% of which was counseling and coordination of care.     Candido Joshua  MD  Professor, Dept. Of Pediatrics  Division of Blood and Marrow Transplantation       Patient Active Problem List   Diagnosis     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)     Aaron-Parkinson-White (WPW) syndrome     Bone marrow transplant candidate     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)     At risk for infection     S/P allogeneic bone marrow transplant (H)     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

## 2020-07-30 LAB
ALBUMIN SERPL-MCNC: 3 G/DL (ref 3.4–5)
ALP SERPL-CCNC: 62 U/L (ref 40–150)
ALT SERPL W P-5'-P-CCNC: 67 U/L (ref 0–70)
ANION GAP SERPL CALCULATED.3IONS-SCNC: 5 MMOL/L (ref 3–14)
ANION GAP SERPL CALCULATED.3IONS-SCNC: 6 MMOL/L (ref 3–14)
AST SERPL W P-5'-P-CCNC: 17 U/L (ref 0–45)
BACTERIA SPEC CULT: NO GROWTH
BACTERIA SPEC CULT: NO GROWTH
BILIRUB SERPL-MCNC: 0.8 MG/DL (ref 0.2–1.3)
BLD PROD TYP BPU: NORMAL
BLD PROD TYP BPU: NORMAL
BLD UNIT ID BPU: 0
BLOOD PRODUCT CODE: NORMAL
BPU ID: NORMAL
BUN SERPL-MCNC: 18 MG/DL (ref 7–30)
BUN SERPL-MCNC: 19 MG/DL (ref 7–30)
CA-I BLD-MCNC: 4.7 MG/DL (ref 4.4–5.2)
CA-I BLD-MCNC: 4.8 MG/DL (ref 4.4–5.2)
CALCIUM SERPL-MCNC: 8.1 MG/DL (ref 8.5–10.1)
CALCIUM SERPL-MCNC: 8.4 MG/DL (ref 8.5–10.1)
CHLORIDE SERPL-SCNC: 108 MMOL/L (ref 94–109)
CHLORIDE SERPL-SCNC: 110 MMOL/L (ref 94–109)
CO2 SERPL-SCNC: 25 MMOL/L (ref 20–32)
CO2 SERPL-SCNC: 25 MMOL/L (ref 20–32)
CREAT SERPL-MCNC: 0.58 MG/DL (ref 0.66–1.25)
CREAT SERPL-MCNC: 0.63 MG/DL (ref 0.66–1.25)
DIFFERENTIAL METHOD BLD: ABNORMAL
ERYTHROCYTE [DISTWIDTH] IN BLOOD BY AUTOMATED COUNT: 13.4 % (ref 10–15)
GFR SERPL CREATININE-BSD FRML MDRD: >90 ML/MIN/{1.73_M2}
GFR SERPL CREATININE-BSD FRML MDRD: >90 ML/MIN/{1.73_M2}
GLUCOSE SERPL-MCNC: 100 MG/DL (ref 70–99)
GLUCOSE SERPL-MCNC: 116 MG/DL (ref 70–99)
HCT VFR BLD AUTO: 22.2 % (ref 40–53)
HGB BLD-MCNC: 7.7 G/DL (ref 13.3–17.7)
Lab: NORMAL
Lab: NORMAL
MCH RBC QN AUTO: 28.3 PG (ref 26.5–33)
MCHC RBC AUTO-ENTMCNC: 34.7 G/DL (ref 31.5–36.5)
MCV RBC AUTO: 82 FL (ref 78–100)
NUM BPU REQUESTED: 1
PHOSPHATE SERPL-MCNC: 3.6 MG/DL (ref 2.5–4.5)
PHOSPHATE SERPL-MCNC: 3.7 MG/DL (ref 2.5–4.5)
PLATELET # BLD AUTO: 8 10E9/L (ref 150–450)
POTASSIUM SERPL-SCNC: 3.3 MMOL/L (ref 3.4–5.3)
POTASSIUM SERPL-SCNC: 3.7 MMOL/L (ref 3.4–5.3)
PROT SERPL-MCNC: 6.6 G/DL (ref 6.8–8.8)
RBC # BLD AUTO: 2.72 10E12/L (ref 4.4–5.9)
SODIUM SERPL-SCNC: 139 MMOL/L (ref 133–144)
SODIUM SERPL-SCNC: 140 MMOL/L (ref 133–144)
SPECIMEN SOURCE: NORMAL
SPECIMEN SOURCE: NORMAL
TRANSFUSION STATUS PATIENT QL: NORMAL
TRANSFUSION STATUS PATIENT QL: NORMAL
URATE SERPL-MCNC: 1 MG/DL (ref 3.5–7.2)
URATE SERPL-MCNC: 1.1 MG/DL (ref 3.5–7.2)
WBC # BLD AUTO: 0.2 10E9/L (ref 4–11)

## 2020-07-30 PROCEDURE — 86901 BLOOD TYPING SEROLOGIC RH(D): CPT | Performed by: STUDENT IN AN ORGANIZED HEALTH CARE EDUCATION/TRAINING PROGRAM

## 2020-07-30 PROCEDURE — 86900 BLOOD TYPING SEROLOGIC ABO: CPT | Performed by: STUDENT IN AN ORGANIZED HEALTH CARE EDUCATION/TRAINING PROGRAM

## 2020-07-30 PROCEDURE — P9037 PLATE PHERES LEUKOREDU IRRAD: HCPCS | Performed by: STUDENT IN AN ORGANIZED HEALTH CARE EDUCATION/TRAINING PROGRAM

## 2020-07-30 PROCEDURE — 25000128 H RX IP 250 OP 636: Performed by: PEDIATRICS

## 2020-07-30 PROCEDURE — 85027 COMPLETE CBC AUTOMATED: CPT

## 2020-07-30 PROCEDURE — 84550 ASSAY OF BLOOD/URIC ACID: CPT | Performed by: STUDENT IN AN ORGANIZED HEALTH CARE EDUCATION/TRAINING PROGRAM

## 2020-07-30 PROCEDURE — 86922 COMPATIBILITY TEST ANTIGLOB: CPT | Performed by: STUDENT IN AN ORGANIZED HEALTH CARE EDUCATION/TRAINING PROGRAM

## 2020-07-30 PROCEDURE — 20600000 ZZH R&B BMT

## 2020-07-30 PROCEDURE — 25000125 ZZHC RX 250: Performed by: PEDIATRICS

## 2020-07-30 PROCEDURE — 84550 ASSAY OF BLOOD/URIC ACID: CPT | Performed by: PHYSICIAN ASSISTANT

## 2020-07-30 PROCEDURE — 84100 ASSAY OF PHOSPHORUS: CPT | Performed by: STUDENT IN AN ORGANIZED HEALTH CARE EDUCATION/TRAINING PROGRAM

## 2020-07-30 PROCEDURE — 25800030 ZZH RX IP 258 OP 636: Performed by: PEDIATRICS

## 2020-07-30 PROCEDURE — 25000132 ZZH RX MED GY IP 250 OP 250 PS 637: Performed by: PEDIATRICS

## 2020-07-30 PROCEDURE — 84100 ASSAY OF PHOSPHORUS: CPT | Performed by: PHYSICIAN ASSISTANT

## 2020-07-30 PROCEDURE — 86850 RBC ANTIBODY SCREEN: CPT | Performed by: STUDENT IN AN ORGANIZED HEALTH CARE EDUCATION/TRAINING PROGRAM

## 2020-07-30 PROCEDURE — 25000128 H RX IP 250 OP 636: Performed by: STUDENT IN AN ORGANIZED HEALTH CARE EDUCATION/TRAINING PROGRAM

## 2020-07-30 PROCEDURE — 82330 ASSAY OF CALCIUM: CPT | Performed by: PHYSICIAN ASSISTANT

## 2020-07-30 PROCEDURE — 80053 COMPREHEN METABOLIC PANEL: CPT | Performed by: STUDENT IN AN ORGANIZED HEALTH CARE EDUCATION/TRAINING PROGRAM

## 2020-07-30 PROCEDURE — 25000132 ZZH RX MED GY IP 250 OP 250 PS 637: Performed by: STUDENT IN AN ORGANIZED HEALTH CARE EDUCATION/TRAINING PROGRAM

## 2020-07-30 PROCEDURE — 80048 BASIC METABOLIC PNL TOTAL CA: CPT | Performed by: PHYSICIAN ASSISTANT

## 2020-07-30 RX ADMIN — CEFEPIME 2000 MG: 2 INJECTION, POWDER, FOR SOLUTION INTRAVENOUS at 23:28

## 2020-07-30 RX ADMIN — Medication 150 MG: at 07:31

## 2020-07-30 RX ADMIN — ACETAMINOPHEN 650 MG: 325 TABLET, FILM COATED ORAL at 22:14

## 2020-07-30 RX ADMIN — VALACYCLOVIR HYDROCHLORIDE 1000 MG: 500 TABLET, FILM COATED ORAL at 13:50

## 2020-07-30 RX ADMIN — CEFEPIME 2000 MG: 2 INJECTION, POWDER, FOR SOLUTION INTRAVENOUS at 07:31

## 2020-07-30 RX ADMIN — PANTOPRAZOLE SODIUM 40 MG: 20 TABLET, DELAYED RELEASE ORAL at 07:31

## 2020-07-30 RX ADMIN — VALACYCLOVIR HYDROCHLORIDE 1000 MG: 500 TABLET, FILM COATED ORAL at 20:33

## 2020-07-30 RX ADMIN — VALACYCLOVIR HYDROCHLORIDE 1000 MG: 500 TABLET, FILM COATED ORAL at 07:31

## 2020-07-30 RX ADMIN — VANCOMYCIN HYDROCHLORIDE 1000 MG: 1 INJECTION, SOLUTION INTRAVENOUS at 01:01

## 2020-07-30 RX ADMIN — Medication 150 MG: at 20:33

## 2020-07-30 RX ADMIN — PHYTONADIONE: 1 INJECTION, EMULSION INTRAMUSCULAR; INTRAVENOUS; SUBCUTANEOUS at 20:33

## 2020-07-30 RX ADMIN — MICAFUNGIN SODIUM 50 MG: 10 INJECTION, POWDER, LYOPHILIZED, FOR SOLUTION INTRAVENOUS at 20:33

## 2020-07-30 RX ADMIN — DIPHENHYDRAMINE HYDROCHLORIDE 50 MG: 50 INJECTION, SOLUTION INTRAMUSCULAR; INTRAVENOUS at 03:57

## 2020-07-30 RX ADMIN — I.V. FAT EMULSION 250 ML: 20 EMULSION INTRAVENOUS at 20:33

## 2020-07-30 RX ADMIN — Medication 150 MG: at 13:50

## 2020-07-30 RX ADMIN — ACETAMINOPHEN 650 MG: 325 TABLET, FILM COATED ORAL at 03:56

## 2020-07-30 RX ADMIN — CEFEPIME 2000 MG: 2 INJECTION, POWDER, FOR SOLUTION INTRAVENOUS at 15:31

## 2020-07-30 NOTE — PLAN OF CARE
Afebrile, HR 70s-90s, blood pressures 90s/50s. LS clear, respirations shallow, sats well on RA. No nausea or emesis. Denies pain. Platelets given overnight with tylenol and benadryl pre-meds. Patient developed hives half way through transfusion, MD notified, rate slowed. Patient tolerated remainder of transfusion without any problems. Hourly rounding completed. Continue with plan of care.

## 2020-07-30 NOTE — PROGRESS NOTES
07/30/20 1544   Child Life   Location BMT   Intervention ([employer] provided pt's Bingo prize. Pt also interested in video game rental from [facility] and [facility] provided list of games and [facility] Newsletter. Pt asking about unit CCLS following up about Lego project, which ARINA Blank will follow up about tomorrow.)   Outcomes/Follow Up Provided Materials;Continue to Follow/Support

## 2020-07-30 NOTE — PROGRESS NOTES
Pediatric BMT Daily Progress Note    Interval Events:  Afebrile x 24h. Blood cultures remain NGTD. Blood pressures remain intermittently soft since prior to kymriah.  Platelet transfusion this morning resulted in hives on face which is his typical reaction. No airway concerns and hemodynamically stable.    Review of Systems: Pertinent positives include those mentioned in interval events. A complete review of systems was performed and is otherwise negative.      Medications:  Please see MAR    Physical Exam:  Temp:  [98.5  F (36.9  C)-99.9  F (37.7  C)] 98.5  F (36.9  C)  Pulse:  [62-94] 62  Heart Rate:  [86-88] 86  Resp:  [16-22] 18  BP: (89-98)/(54-63) 96/62  SpO2:  [99 %-100 %] 100 %  I/O last 3 completed shifts:  In: 2689.1 [P.O.:200; I.V.:822.5]  Out: 1200 [Urine:1200]  GEN: Lying in bed, no acute distress, pale.   HEENT: Full head of hair, normocephalic, sclerae clear,  nares patent, OP with mild erythema and no visible lesions.  CARD: RRR, normal S1/S2 without murmur.   RESP: Lungs CTA bilaterally without adventitious lung sounds  ABD: Soft NT, ND, no organomegaly.  EXTREM: warm and well perfused.   SKIN: Pale throughout, clear without erythema, rash or lesions.  ACCESS: CVC and port    Labs:  Labs reviewed, pertinent findings WBC 0.2, Hb 7.7, platelets 8,000. 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 CD19 positive (dim).      Isael completed non-myleoablative chemotherapy prep as an outpatient. He was admitted for fever on 7/24, blood cultures no growth to date.  Kymriah infusion 7/27 uneventful. Febrile. Mild throat pain but not using oxycodone. Blood product transfusion reactions, will only use steroid for severe reaction. Appetite minimal, nutritional support with TPN.  Day +3     ALL and  BMT: High risk B cell ALL (CNS negative) + JAK2 activation.  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  - Completed apheresis collection in the [facility] (6/2020).   - He has been receiving bridging chemotherapy with oral 6MP, methotrexate and ruxolitinib. All oral chemotherapy stopped on 7/12.   - BM revealed 75% involvement with leukemia. CSF was negative.   - Outpatient lymphodepleting chemotherapy per [id number] with cytoxan and fludarabine 7/21-7/24 completed as an outpatient    Kymriah infusion 7/27   - Pre-meds of Tylenol and Benadryl   - D5% 1/2NS 2 hours prior to Kymriah infusion continue 30 minute during infusion and 6 hours post   - No steroid post kymriah unless needed for severe blood product transfusion reaction.      BMT Daily CARTOX Note (complete days 0-14 and with any subsequent CRS/neurotoxicity)    Date: July 30, 2020    Artemio Nino ([id number]) is a 20 year old year old male who received infusion on 7/27/2020, currently day +3 of CAR-T cell therapy Kymriah    Vital Signs: BP 96/62   Pulse 62   Temp 98.5  F (36.9  C) (Oral)   Resp 18   Wt 58.6 kg (129 lb 3 oz)   SpO2 100%   BMI 19.03 kg/m      Organ CAR-T toxicity:    Cardiac: hypotension < 100/60   Pulmonary: No   Renal: No   Liver: No   Coagulopathy: No   Neurologic: No   Immune: No    Overall CRS grade Grade 2 (fever and hypotension)    CRS Parameter Grade 1 Grade 2 Grade 3 Grade 4   Fever* Tm >= 100.4 degrees F Tm >= 100.4 degrees F Tm >= 100.4 degrees F Tm >= to 100.4 degrees F       With Either:  Hypotension None Responsive to Fluids Requiring 1 vasopressor (w/ or w/o vasopressin) Requiring multiple vasopressors (excluding vasopressin)     And/or  Hypoxia None Low-flow nasal cannula or blow-by High-flow nasal cannula, face mask, non-rebreather mask, or Venturi mask Requiring  positive pressure (CPAP, BiPAP intubation and mechanical ventilation)       * Definition of High Dose Pressors for Grade 4  Vasopressor Duration >= 3 hours   Norepinephrine monotherapy >= 20 mcg/kg/minute   Dopamine monotherapy >= 10 mcg/kg/minute   Phenylephrine monotherapy >= 200 mcg/minute   Epinephrine monotherapy >= 10 mcg/minute   If on vasopressin + another agent High dose of vasopressin + norepi equivalent (using VASST formula below) is >= 10 mcg/minute   If on combination vasopressors, not including vasopressin Norepi equivalent of > 20 mcg/minute (using VASST formula below)     VASST formula: Norepi equivalent dose = [NE (mcg/min)] + [dopamine (mcg/min) / 2] + [epinephrine (mcg/min)] + [phenylephrine (mcg/min) / 10]    ICE Assessment  Orientation to year, month, city, hospital: 4 points  Name 3 objects (e.g., point to clock, pen, button): 3 points  Following commands: (e.g., Show me 2 fingers): 1 point   Write a standard sentence (e.g., The pickett jumped over the log): 1 point   Count backwards from 100 by ten: 1 point  Total points: 10: No impairment    CRS Summary  Does patient have CRS? Yes, date of onset: 7/28/2020  Current CRS stgstrstastdstest:st st1st What is the maximum severity to date: Grade 2 (fever and hypotension)  What therapy was given: IV antibiotics  Has CRS grade changed? Yes, new grade and date of change: Grade 0, 7/30/2020  Has CRS resolved? Yes, date of resolution: 7/30/2020    Neurotoxicity Summary  Does patient have neurotoxicity? No  Current Neurotoxicity score (0-10): 0  What is the maximum severity to date:  0  What therapy was given: N/A  Has neurotoxicity resolved? N/A      FEN/Renal:   # Risk for malnutrition: Appetite remains decreased  - Continue nutritional support with TPN/IL x 24h  - Age appropriate diet   - Monitor nutritional intake     # Risk for electrolyte abnormalities:  - Check daily electrolytes     # Risk for renal dysfunction/risk for ELIZ secondary to potential tumor lysis with  preparatory chemotherapy.: GFR 133ml/min  - Daily electrolyte monitoring  - Monitor daily weights     # Risk for TLS: Uric Acid 1.0 today.  - Continue Allopurinol TID     Pulmonary:  # Risk for pulmonary insufficiency: No current concerns. Normal chest and sinus CTs during work-up.      Cardiovascular:  Aaron-Parkinson-White Syndrome: diagnosed upon syncope in 02/2018. Asymptomatic, no interventions to date. No current concerns.  - EKG 7/15 revealed WPW with normal sinus rhythm.   - Echocardiogram 7/15 showed LVEF of 62%  - Cardiology to decide if they want to assess him.     Heme:   # Pancytopenia secondary to chemotherapy:  - Transfuse for hemoglobin < 7, platelets < 10,000   -  Mild hives noted at end of pRBC transfusion this 7/25. Premedicate with Tylenol and Benadryl for pRBC transfusions.   - Mild platelet transfusion reaction this morning, hives on face without airway concern. No intervention.  - Recent history of significant hives with platelet transfusions. Was being premedicated with hydrocortisone, tylenol, and benadryl for platelets. However, no hydrocortisone premedication ordered now given CAR-T. If severe reaction then ok to give steroids per Dr. Zavala.   - No GCSF per protocol     Infectious Disease:  # Febrile Neutropenia: Admitted for fever. Afebrile x 24h. Discontinue vancomycin.  Continue Cefepime and follow blood cultures closely  - Intermittent decreases in BPs since prior to kymriah infsuion but overall hemodynamically stable.  - Covid negative 7/17 and 7/24     # Risk for infection given immunocompromised status:    Active: See above  Prophylaxis:                                                                      - Viral prophylaxis: HD Valtrex continue through Day +30  - Fungal prophylaxis: Micafungin continue through Day +30. History of photoxic drug eruption with voriconazole, avoid use.   - Bacterial prophylaxis: Levo on hold, continue cefepime IV neutropenic. Fevers maybe related  to his leukemia   - PCP prophylaxis: Bactrim to begin day +28      GI:   # Nausea:  - Scheduled medications: Kytril BID - discontinued per patient request 7/26  - PRN medications: Benadryl PRN     # Acid reflux:  Protonix once daily. Continue TUMS prn.     # Transaminitis:  Possibly related to recent chemotherapy. May warrant RUQ US to further assess persistent transamnitis.    - Downtrending. ALT 67 AST 17 today.     # Abdominal pain and diarrhea: One episode overnight 7/22  - Consider stool collection if frequency increases     Neuro:  # Mucositis/pain: Throat pain persists  - Oxycodone PRN available  - Tramadol prn  - Covid negative 7/17 and 7/24     Oral Hygiene:   # Receding gums, risk for infection  - Continue chlorhexidine mouth wash for symptomatic control, pain now improved     Derm:  No current rash. He is no longer using creams.     Fertility:  Sperm collected pre chemotherapy for fertility preservation      Discharge Considerations: Expected lengths of hospitalization for patients with complications from stem cell transplant vary based on the complication(s) and severity(ies). A typical stay is 7 days.     The above plan of care was developed by and communicated to me by the Pediatric BMT attending physician, MD Cnadido Tai PA-C  Pediatric Blood and Marrow Transplant Program  [facility]         Pediatric BMT Inpatient Attending Note:    Artemio was seen and evaluated by me today.       The significant interval history includes: afebrile for past 24 hours, some softer blood pressures close to baseline, received platelet transfusion x 1 overnight, developed some hives but otherwise no major concerns. Blood cultures continue to be negative. We continue to monitor him closely for a clinical change following the infusion of the CAR T cells. His mouth/throat pain is stable. He continues to take little orally. He remains neutropenic.  We  will maintain his antibiotics and TPN. There is no increase in uric acid or other parameters.     I have reviewed the vital signs, medications and lab results.  I assisted in formulating a plan, which was instituted by the BMT team. The total amount of time spent in the admission and care of Artemio Nino today was >40 minutes, at least 50% of which was counseling and coordination of care.     Sid Girard MD    Pediatric Blood and Marrow Transplant   [facility]  Pager: [phone]         Patient Active Problem List   Diagnosis     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)     Aaron-Parkinson-White (WPW) syndrome     Bone marrow transplant candidate     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)     At risk for infection     S/P allogeneic bone marrow transplant (H)     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

## 2020-07-30 NOTE — PLAN OF CARE
Afebrile. VSS. Lung sounds clear. No c/o pain or nausea. Mom at bedside. Hourly rounding complete. Will continue to monitor and assess.

## 2020-07-30 NOTE — PROGRESS NOTES
Afebrile, lungs clear, VSS, no pain or nausea, eating and drinking well, per mom and pt they would like blood products given on days due to hive history, discussed improtance of oral care- pt seems to be in bed all day and uses urinal at bedside so assuming mouth care is being done in the bathroom when he goes to the bathroom is not true. Mom at bedside- both in good spirits. Hourly rounding completed, continue with POC.

## 2020-07-31 LAB
ABO + RH BLD: NORMAL
ABO + RH BLD: NORMAL
ALBUMIN SERPL-MCNC: 3 G/DL (ref 3.4–5)
ALP SERPL-CCNC: 60 U/L (ref 40–150)
ALT SERPL W P-5'-P-CCNC: 56 U/L (ref 0–70)
ANION GAP SERPL CALCULATED.3IONS-SCNC: 5 MMOL/L (ref 3–14)
ANION GAP SERPL CALCULATED.3IONS-SCNC: 6 MMOL/L (ref 3–14)
APTT PPP: 34 SEC (ref 22–37)
AST SERPL W P-5'-P-CCNC: 16 U/L (ref 0–45)
BACTERIA SPEC CULT: NO GROWTH
BACTERIA SPEC CULT: NO GROWTH
BILIRUB SERPL-MCNC: 0.6 MG/DL (ref 0.2–1.3)
BLD GP AB SCN SERPL QL: NORMAL
BLD PROD TYP BPU: NORMAL
BLD UNIT ID BPU: 0
BLD UNIT ID BPU: 0
BLOOD BANK CMNT PATIENT-IMP: NORMAL
BLOOD PRODUCT CODE: NORMAL
BLOOD PRODUCT CODE: NORMAL
BPU ID: NORMAL
BPU ID: NORMAL
BUN SERPL-MCNC: 20 MG/DL (ref 7–30)
BUN SERPL-MCNC: 21 MG/DL (ref 7–30)
CA-I BLD-MCNC: 4.7 MG/DL (ref 4.4–5.2)
CA-I BLD-MCNC: 4.8 MG/DL (ref 4.4–5.2)
CALCIUM SERPL-MCNC: 8.3 MG/DL (ref 8.5–10.1)
CALCIUM SERPL-MCNC: 9.1 MG/DL (ref 8.5–10.1)
CHLORIDE SERPL-SCNC: 108 MMOL/L (ref 94–109)
CHLORIDE SERPL-SCNC: 110 MMOL/L (ref 94–109)
CO2 SERPL-SCNC: 25 MMOL/L (ref 20–32)
CO2 SERPL-SCNC: 26 MMOL/L (ref 20–32)
CREAT SERPL-MCNC: 0.59 MG/DL (ref 0.66–1.25)
CREAT SERPL-MCNC: 0.61 MG/DL (ref 0.66–1.25)
CRP SERPL-MCNC: 36.8 MG/L (ref 0–8)
D DIMER PPP FEU-MCNC: 0.3 UG/ML FEU (ref 0–0.5)
DIFFERENTIAL METHOD BLD: ABNORMAL
ERYTHROCYTE [DISTWIDTH] IN BLOOD BY AUTOMATED COUNT: 13.4 % (ref 10–15)
FERRITIN SERPL-MCNC: 1809 NG/ML (ref 26–388)
FIBRINOGEN PPP-MCNC: 478 MG/DL (ref 200–420)
GFR SERPL CREATININE-BSD FRML MDRD: >90 ML/MIN/{1.73_M2}
GFR SERPL CREATININE-BSD FRML MDRD: >90 ML/MIN/{1.73_M2}
GLUCOSE SERPL-MCNC: 104 MG/DL (ref 70–99)
GLUCOSE SERPL-MCNC: 122 MG/DL (ref 70–99)
HCT VFR BLD AUTO: 17.4 % (ref 40–53)
HGB BLD-MCNC: 6 G/DL (ref 13.3–17.7)
INR PPP: 1.24 (ref 0.86–1.14)
LDH SERPL L TO P-CCNC: 108 U/L (ref 85–227)
Lab: NORMAL
Lab: NORMAL
MAGNESIUM SERPL-MCNC: 2 MG/DL (ref 1.6–2.3)
MCH RBC QN AUTO: 28.3 PG (ref 26.5–33)
MCHC RBC AUTO-ENTMCNC: 34.5 G/DL (ref 31.5–36.5)
MCV RBC AUTO: 82 FL (ref 78–100)
NUM BPU REQUESTED: 2
PHOSPHATE SERPL-MCNC: 4 MG/DL (ref 2.5–4.5)
PHOSPHATE SERPL-MCNC: 4.4 MG/DL (ref 2.5–4.5)
PLATELET # BLD AUTO: 28 10E9/L (ref 150–450)
POTASSIUM SERPL-SCNC: 3.5 MMOL/L (ref 3.4–5.3)
POTASSIUM SERPL-SCNC: 4 MMOL/L (ref 3.4–5.3)
PROT SERPL-MCNC: 6.5 G/DL (ref 6.8–8.8)
RBC # BLD AUTO: 2.12 10E12/L (ref 4.4–5.9)
SODIUM SERPL-SCNC: 139 MMOL/L (ref 133–144)
SODIUM SERPL-SCNC: 141 MMOL/L (ref 133–144)
SPECIMEN EXP DATE BLD: NORMAL
SPECIMEN SOURCE: NORMAL
SPECIMEN SOURCE: NORMAL
TRANSFUSION STATUS PATIENT QL: NORMAL
URATE SERPL-MCNC: 1 MG/DL (ref 3.5–7.2)
URATE SERPL-MCNC: 1.3 MG/DL (ref 3.5–7.2)
WBC # BLD AUTO: 0.2 10E9/L (ref 4–11)

## 2020-07-31 PROCEDURE — 83735 ASSAY OF MAGNESIUM: CPT | Performed by: STUDENT IN AN ORGANIZED HEALTH CARE EDUCATION/TRAINING PROGRAM

## 2020-07-31 PROCEDURE — 83615 LACTATE (LD) (LDH) ENZYME: CPT | Performed by: NURSE PRACTITIONER

## 2020-07-31 PROCEDURE — 82330 ASSAY OF CALCIUM: CPT | Performed by: PHYSICIAN ASSISTANT

## 2020-07-31 PROCEDURE — 80053 COMPREHEN METABOLIC PANEL: CPT | Performed by: STUDENT IN AN ORGANIZED HEALTH CARE EDUCATION/TRAINING PROGRAM

## 2020-07-31 PROCEDURE — 85379 FIBRIN DEGRADATION QUANT: CPT | Performed by: NURSE PRACTITIONER

## 2020-07-31 PROCEDURE — 25000125 ZZHC RX 250: Performed by: PEDIATRICS

## 2020-07-31 PROCEDURE — 25000128 H RX IP 250 OP 636: Performed by: PEDIATRICS

## 2020-07-31 PROCEDURE — 86140 C-REACTIVE PROTEIN: CPT | Performed by: NURSE PRACTITIONER

## 2020-07-31 PROCEDURE — 25800030 ZZH RX IP 258 OP 636: Performed by: PEDIATRICS

## 2020-07-31 PROCEDURE — 85027 COMPLETE CBC AUTOMATED: CPT

## 2020-07-31 PROCEDURE — 85384 FIBRINOGEN ACTIVITY: CPT | Performed by: NURSE PRACTITIONER

## 2020-07-31 PROCEDURE — 84100 ASSAY OF PHOSPHORUS: CPT | Performed by: STUDENT IN AN ORGANIZED HEALTH CARE EDUCATION/TRAINING PROGRAM

## 2020-07-31 PROCEDURE — 85730 THROMBOPLASTIN TIME PARTIAL: CPT | Performed by: NURSE PRACTITIONER

## 2020-07-31 PROCEDURE — 25000128 H RX IP 250 OP 636: Performed by: STUDENT IN AN ORGANIZED HEALTH CARE EDUCATION/TRAINING PROGRAM

## 2020-07-31 PROCEDURE — 25000132 ZZH RX MED GY IP 250 OP 250 PS 637: Performed by: PEDIATRICS

## 2020-07-31 PROCEDURE — 82728 ASSAY OF FERRITIN: CPT | Performed by: NURSE PRACTITIONER

## 2020-07-31 PROCEDURE — P9040 RBC LEUKOREDUCED IRRADIATED: HCPCS | Performed by: STUDENT IN AN ORGANIZED HEALTH CARE EDUCATION/TRAINING PROGRAM

## 2020-07-31 PROCEDURE — 80048 BASIC METABOLIC PNL TOTAL CA: CPT | Performed by: PHYSICIAN ASSISTANT

## 2020-07-31 PROCEDURE — 84100 ASSAY OF PHOSPHORUS: CPT | Performed by: PHYSICIAN ASSISTANT

## 2020-07-31 PROCEDURE — 84550 ASSAY OF BLOOD/URIC ACID: CPT | Performed by: PHYSICIAN ASSISTANT

## 2020-07-31 PROCEDURE — 85610 PROTHROMBIN TIME: CPT | Performed by: NURSE PRACTITIONER

## 2020-07-31 PROCEDURE — 20600000 ZZH R&B BMT

## 2020-07-31 PROCEDURE — 25000132 ZZH RX MED GY IP 250 OP 250 PS 637: Performed by: STUDENT IN AN ORGANIZED HEALTH CARE EDUCATION/TRAINING PROGRAM

## 2020-07-31 PROCEDURE — 25800030 ZZH RX IP 258 OP 636: Performed by: PHYSICIAN ASSISTANT

## 2020-07-31 PROCEDURE — 84550 ASSAY OF BLOOD/URIC ACID: CPT | Performed by: STUDENT IN AN ORGANIZED HEALTH CARE EDUCATION/TRAINING PROGRAM

## 2020-07-31 RX ADMIN — Medication 150 MG: at 13:37

## 2020-07-31 RX ADMIN — PHYTONADIONE: 1 INJECTION, EMULSION INTRAMUSCULAR; INTRAVENOUS; SUBCUTANEOUS at 21:00

## 2020-07-31 RX ADMIN — DIPHENHYDRAMINE HYDROCHLORIDE 50 MG: 50 INJECTION, SOLUTION INTRAMUSCULAR; INTRAVENOUS at 07:01

## 2020-07-31 RX ADMIN — Medication 150 MG: at 09:35

## 2020-07-31 RX ADMIN — Medication 150 MG: at 20:59

## 2020-07-31 RX ADMIN — VALACYCLOVIR HYDROCHLORIDE 1000 MG: 500 TABLET, FILM COATED ORAL at 13:37

## 2020-07-31 RX ADMIN — PANTOPRAZOLE SODIUM 40 MG: 20 TABLET, DELAYED RELEASE ORAL at 09:35

## 2020-07-31 RX ADMIN — VALACYCLOVIR HYDROCHLORIDE 1000 MG: 500 TABLET, FILM COATED ORAL at 09:36

## 2020-07-31 RX ADMIN — CEFEPIME 2000 MG: 2 INJECTION, POWDER, FOR SOLUTION INTRAVENOUS at 07:01

## 2020-07-31 RX ADMIN — I.V. FAT EMULSION 250 ML: 20 EMULSION INTRAVENOUS at 21:00

## 2020-07-31 RX ADMIN — SODIUM CHLORIDE: 900 INJECTION INTRAVENOUS at 14:11

## 2020-07-31 RX ADMIN — MICAFUNGIN SODIUM 50 MG: 10 INJECTION, POWDER, LYOPHILIZED, FOR SOLUTION INTRAVENOUS at 20:59

## 2020-07-31 RX ADMIN — ACETAMINOPHEN 650 MG: 325 TABLET, FILM COATED ORAL at 07:01

## 2020-07-31 RX ADMIN — CEFEPIME 2000 MG: 2 INJECTION, POWDER, FOR SOLUTION INTRAVENOUS at 15:56

## 2020-07-31 RX ADMIN — VALACYCLOVIR HYDROCHLORIDE 1000 MG: 500 TABLET, FILM COATED ORAL at 20:59

## 2020-07-31 NOTE — PROGRESS NOTES
CLINICAL NUTRITION SERVICES - REASSESSMENT NOTE    ANTHROPOMETRICS  Height/Length: 175.5 cm  Weight: 58.7 kg  BMI: 19.1 kg/m2- BMI indicates underweight status  Dosing weight: 58.4 kg  Comments: Weight in October of 2018 was 65.1 kg- This is a 10% weight loss.  Comments:weight stable since starting PN    CURRENT NUTRITION ORDERS  Diet:Regular    CURRENT NUTRITION SUPPORT   Parenteral Nutrition:  Type of Parenteral Access: Central  PN frequency: Continuous     PN of 1450 mLs, GIR of 1.78 mg/kg/min (150 g dextrose), 88 g protein, 1.5 g/kg protein and 250 mL lipids to provide 1360 kcals (23 kcal/kg).  PN is meeting 77% of kcal needs and 100% of protein needs.    Intake/Tolerance: yesterday ate pancakes and hashbrowns and drank 900 mLs.    Current factors affecting nutrition intake include:decreased appetite, pain and side effects of treatment    NEW FINDINGS:  [facility] Day +4    LABS  Labs reviewed    MEDICATIONS  Medications reviewed    ASSESSED NUTRITION NEEDS:  Estimated Energy Needs: 30-35 kcal/kg  Estimated Protein Needs: 1.5 g/kg  Estimated Fluid Needs: 1 mL/kcal    EVALUATION OF PREVIOUS PLAN OF CARE:   Monitoring from previous assessment:  Food and Beverage intake- taking some po   Enteral and parenteral nutrition intake- continues on PN   Anthropometric measurements- weight stable    Previous Goals:   1. Po and/or nutrition support to meet greater than 75% of needs  2. Weight maintenance   Evaluation: Met    Previous Nutrition Diagnosis:   Inadequate oral intake related to nausea, mucositis, and pain as evidence by decreased po, wt loss and reliance on PN to meet needs.  Evaluation: No change    NUTRITION DIAGNOSIS:  Inadequate oral intake related to nausea, mucositis, and pain as evidence by decreased po, wt loss and reliance on PN to meet needs.    INTERVENTIONS  Nutrition Prescription  Po/nutrition support to meet needs for weight maintenance    Implementation:  Meals/ Snack- continues to take some po.  Parenteral Nutrition- Continue with current macro nutrients. Collaboration and Referral of Nutrition care- pt discussed in rounds.    Goals  1. Po and/or nutrition support to meet greater than 75% of needs  2. Weight maintenance     FOLLOW UP/MONITORING  Food and Beverage intake- monitor po  Enteral and parenteral nutrition intake- adjust as needed and   Anthropometric measurements- monitor wt    RECOMMENDATIONS  Prior to discharge, change back to 12 hour PN.    Renetta Schwab, RD, LD, [facility]  [phone]

## 2020-07-31 NOTE — PLAN OF CARE
[id number]. Afebrile. VSS on RA. LS clear. No reports of pain or nausea. Received one units of PRBCs, tolerated well. Nearly finished with second unit of PRBCs, tolerating well thus far. Mother at bedside and updated. Hourly rounding complete. Continue with plan of care.

## 2020-07-31 NOTE — PLAN OF CARE
Afebrile, heart rates 80s-90s, blood pressures 80s-90s/50s. LS clear and sats well on room air. No nausea or emesis. Patient denies pain overnight. Patient's mom has requested blood products to be given on days, so Artemio will need RBCs per replacement parameters (hemoglobin of 6.0) during days on 7/31. Patient has a history of urticaria with blood transfusions so he requires tylenol and benadryl as premeds, but should not be given a steroid. Safety rounds completed. Continue with plan of care.

## 2020-07-31 NOTE — PLAN OF CARE
Afebrile. VSS. Lung sounds clear. Pt c/o neck pain 4/10, received PRN tylenol x 1. No c/o nausea. No family at bedside. Hourly rounding complete. Will continue to monitor and assess.

## 2020-07-31 NOTE — PROGRESS NOTES
Pediatric BMT Daily Progress Note    Interval Events:  Afebrile x 24h+. Blood cultures remain NGTD. PRBC transfusion this morning.  No airway concerns and hemodynamically stable.    Review of Systems: Pertinent positives include those mentioned in interval events. A complete review of systems was performed and is otherwise negative.      Medications:  Please see MAR    Physical Exam:  Temp:  [98.3  F (36.8  C)-100.1  F (37.8  C)] 98.4  F (36.9  C)  Pulse:  [59-97] 59  Resp:  [16-20] 20  BP: ()/(53-69) 103/69  SpO2:  [98 %-100 %] 100 %  I/O last 3 completed shifts:  In: 2719.6 [P.O.:900; I.V.:470]  Out: 2250 [Urine:2250]  GEN:sitting up in bed, no acute distress, pale. Mom present  HEENT: Full head of hair, normocephalic, sclerae clear,  nares patent, OP with mild erythema and no visible lesions.  CARD: RRR, normal S1/S2 without murmur.   RESP: Lungs CTA bilaterally without adventitious lung sounds  ABD: Soft NT, ND, no organomegaly.  EXTREM: warm and well perfused.   SKIN: Pale throughout, clear without erythema, rash or lesions.  ACCESS: CVC and port    Labs:  Labs reviewed, pertinent findings WBC 0.2, Hb 6.0, platelets 28,000. 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 CD19 positive (dim).      Isael completed non-myleoablative chemotherapy prep as an outpatient. He was admitted for fever on 7/24, blood cultures no growth to date.  Kymriah infusion 7/27 uneventful. Febrile. Mild throat pain but not using oxycodone. Blood product transfusion reactions, will only use steroid for severe reaction. Appetite minimal, nutritional support with TPN.  Day +4     ALL and BMT: High risk B cell ALL (CNS negative) + JAK2 activation.  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  - Completed apheresis collection in the [facility] (6/2020).   - He has been receiving bridging chemotherapy with oral 6MP, methotrexate and ruxolitinib. All oral chemotherapy stopped on 7/12.   - BM revealed 75% involvement with leukemia. CSF was negative.   - Outpatient lymphodepleting chemotherapy per [id number] with cytoxan and fludarabine 7/21-7/24 completed as an outpatient    Kymriah infusion 7/27   - Pre-meds of Tylenol and Benadryl   - D5% 1/2NS 2 hours prior to Kymriah infusion continue 30 minute during infusion and 6 hours post   - No steroid post kymriah unless needed for severe blood product transfusion reaction.      BMT Daily CARTOX Note (complete days 0-14 and with any subsequent CRS/neurotoxicity)    Date: July 30, 2020    Artemio Nino ([id number]) is a 20 year old year old male who received infusion on 7/27/2020, currently day +3 of CAR-T cell therapy Kymriah    Vital Signs: /69   Pulse 59   Temp 98.4  F (36.9  C) (Oral)   Resp 20   Wt 59 kg (130 lb 1.1 oz)   SpO2 100%   BMI 19.16 kg/m      Organ CAR-T toxicity:    Cardiac: hypotension < 100/60 (mild hypotension at times no intervention needed and stable for his baseline blood pressure range prior to admission)    Pulmonary: No   Renal: No   Liver: No   Coagulopathy: No   Neurologic: No   Immune: No    Overall CRS grade Grade 1  (no fever and no hypotension)    CRS Parameter Grade 1 Grade 2 Grade 3 Grade 4   Fever* Tm >= 100.4 degrees F Tm >= 100.4 degrees F Tm >= 100.4 degrees F Tm >= to 100.4 degrees F       With Either:  Hypotension None Responsive to Fluids Requiring 1 vasopressor (w/ or w/o vasopressin) Requiring multiple vasopressors (excluding vasopressin)     And/or  Hypoxia None Low-flow nasal cannula or blow-by High-flow nasal cannula, face mask, non-rebreather mask, or Venturi mask Requiring positive  pressure (CPAP, BiPAP intubation and mechanical ventilation)       * Definition of High Dose Pressors for Grade 4  Vasopressor Duration >= 3 hours   Norepinephrine monotherapy >= 20 mcg/kg/minute   Dopamine monotherapy >= 10 mcg/kg/minute   Phenylephrine monotherapy >= 200 mcg/minute   Epinephrine monotherapy >= 10 mcg/minute   If on vasopressin + another agent High dose of vasopressin + norepi equivalent (using VASST formula below) is >= 10 mcg/minute   If on combination vasopressors, not including vasopressin Norepi equivalent of > 20 mcg/minute (using VASST formula below)     VASST formula: Norepi equivalent dose = [NE (mcg/min)] + [dopamine (mcg/min) / 2] + [epinephrine (mcg/min)] + [phenylephrine (mcg/min) / 10]    ICE Assessment  Orientation to year, month, city, hospital: 4 points  Name 3 objects (e.g., point to clock, pen, button): 3 points  Following commands: (e.g., Show me 2 fingers): 1 point   Write a standard sentence (e.g., The pickett jumped over the log): 1 point   Count backwards from 100 by ten: 1 point  Total points: 10: No impairment    CRS Summary  Does patient have CRS? Yes, date of onset: 7/28/2020  Current CRS stgstrstastdstest:st st1st What is the maximum severity to date: Grade 2 (fever and hypotension)  What therapy was given: IV antibiotics  Has CRS grade changed? Yes, new grade and date of change: Grade 0, 7/30/2020  Has CRS resolved? Yes, date of resolution: 7/30/2020    Neurotoxicity Summary  Does patient have neurotoxicity? No  Current Neurotoxicity score (0-10): 0  What is the maximum severity to date:  0  What therapy was given: N/A  Has neurotoxicity resolved? N/A    BMT Daily CARTOX Note (complete days 0-14 and with any subsequent CRS/neurotoxicity)    FEN/Renal:   # Risk for malnutrition: Appetite remains decreased  - Continue nutritional support with TPN/IL x 24h  - Age appropriate diet   - Monitor nutritional intake     # Risk for electrolyte abnormalities:  - Check daily electrolytes     # Risk  for renal dysfunction/risk for ELIZ secondary to potential tumor lysis with preparatory chemotherapy.: GFR 133ml/min  - Daily electrolyte monitoring  - Monitor daily weights     # Risk for TLS: Uric Acid 1.3 today.  - Continue Allopurinol TID     Pulmonary:  # Risk for pulmonary insufficiency: No current concerns. Normal chest and sinus CTs during work-up.      Cardiovascular:  Aaron-Parkinson-White Syndrome: diagnosed upon syncope in 02/2018. Asymptomatic, no interventions to date. No current concerns.  - EKG 7/15 revealed WPW with normal sinus rhythm.   - Echocardiogram 7/15 showed LVEF of 62%  - Cardiology to decide if they want to assess him.     Heme:   # Pancytopenia secondary to chemotherapy:  - Transfuse for hemoglobin < 7, platelets < 10,000   -  Mild hives noted at end of pRBC transfusion this 7/25. Premedicate with Tylenol and Benadryl for pRBC transfusions.   - Recent history of significant hives with platelet transfusions. Was being premedicated with hydrocortisone, tylenol, and benadryl for platelets. However, no hydrocortisone premedication ordered now given CAR-T. If severe reaction then ok to give steroids per Dr. Zavala.   - No GCSF per protocol     Infectious Disease:  # Febrile Neutropenia: Admitted for fever. Afebrile x 24h. Discontinue vancomycin.  Continue Cefepime and follow blood cultures closely  - Intermittent decreases in BPs since prior to kymriah infsuion but overall hemodynamically stable.  - Covid negative 7/17 and 7/24     # Risk for infection given immunocompromised status:    Active: See above  Prophylaxis:                                                                      - Viral prophylaxis: HD Valtrex continue through Day +30  - Fungal prophylaxis: Micafungin continue through Day +30. History of photoxic drug eruption with voriconazole, avoid use.   - Bacterial prophylaxis: Levo on hold, continue cefepime IV neutropenic. Fevers maybe related to his leukemia   - PCP  prophylaxis: Bactrim to begin day +28      GI:   # Nausea:  - Scheduled medications: Kytril BID - discontinued per patient request 7/26  - PRN medications: Benadryl PRN     # Acid reflux:  Protonix once daily. Continue TUMS prn.     # Transaminitis:  Possibly related to recent chemotherapy. May warrant RUQ US to further assess persistent transamnitis.    - Downtrending. ALT 67 AST 17 today.     # Abdominal pain and diarrhea: One episode overnight 7/22  - Consider stool collection if frequency increases     Neuro:  # Mucositis/pain: Throat pain persists  - Oxycodone PRN available  - Tramadol prn  - Covid negative 7/17 and 7/24     Oral Hygiene:   # Receding gums, risk for infection  - Continue chlorhexidine mouth wash for symptomatic control, pain now improved     Derm:  No current rash. He is no longer using creams.     Fertility:  Sperm collected pre chemotherapy for fertility preservation      Discharge Considerations: Expected lengths of hospitalization for patients with complications from stem cell transplant vary based on the complication(s) and severity(ies). A typical stay is 7 days.     The above plan of care was developed by and communicated to me by the Pediatric BMT attending physician, MD Judy Tai, CNP  Pediatric Nurse Practitioner  Pediatric Blood and Marrow Transplant  [phone] - Pager  [phone] - BMT workroom  [phone] - BMT Clinic             Pediatric BMT Inpatient Attending Note:    Artemio was seen and evaluated by me today.       The significant interval history includes: afebrile, vitals stable, received pRBC transfusion this morning. Blood cultures continue to be negative. We continue to monitor him closely for a clinical change following the infusion of the CAR T cells. His mouth/throat pain is stable. He continues to take little orally. He remains neutropenic.  We will maintain his antibiotics and TPN.      I have reviewed the vital signs, medications  and lab results.  I assisted in formulating a plan, which was instituted by the BMT team. The total amount of time spent in the admission and care of Artemio Nino today was >40 minutes, at least 50% of which was counseling and coordination of care.     Sid Girard MD    Pediatric Blood and Marrow Transplant   [facility]  Pager: [phone]         Patient Active Problem List   Diagnosis     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)     Aaron-Parkinson-White (WPW) syndrome     Bone marrow transplant candidate     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)     At risk for infection     S/P allogeneic bone marrow transplant (H)     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

## 2020-08-01 LAB
ALBUMIN SERPL-MCNC: 3.1 G/DL (ref 3.4–5)
ALP SERPL-CCNC: 62 U/L (ref 40–150)
ALT SERPL W P-5'-P-CCNC: 52 U/L (ref 0–70)
ANION GAP SERPL CALCULATED.3IONS-SCNC: 5 MMOL/L (ref 3–14)
AST SERPL W P-5'-P-CCNC: 16 U/L (ref 0–45)
BILIRUB SERPL-MCNC: 0.9 MG/DL (ref 0.2–1.3)
BUN SERPL-MCNC: 21 MG/DL (ref 7–30)
CA-I BLD-MCNC: 4.9 MG/DL (ref 4.4–5.2)
CALCIUM SERPL-MCNC: 8.6 MG/DL (ref 8.5–10.1)
CHLORIDE SERPL-SCNC: 109 MMOL/L (ref 94–109)
CO2 SERPL-SCNC: 26 MMOL/L (ref 20–32)
CREAT SERPL-MCNC: 0.69 MG/DL (ref 0.66–1.25)
DIFFERENTIAL METHOD BLD: ABNORMAL
ERYTHROCYTE [DISTWIDTH] IN BLOOD BY AUTOMATED COUNT: 13.2 % (ref 10–15)
GFR SERPL CREATININE-BSD FRML MDRD: >90 ML/MIN/{1.73_M2}
GLUCOSE BLDC GLUCOMTR-MCNC: 98 MG/DL (ref 70–99)
GLUCOSE SERPL-MCNC: 98 MG/DL (ref 70–99)
HCT VFR BLD AUTO: 26.6 % (ref 40–53)
HGB BLD-MCNC: 9.4 G/DL (ref 13.3–17.7)
MCH RBC QN AUTO: 29 PG (ref 26.5–33)
MCHC RBC AUTO-ENTMCNC: 35.3 G/DL (ref 31.5–36.5)
MCV RBC AUTO: 82 FL (ref 78–100)
PHOSPHATE SERPL-MCNC: 4.1 MG/DL (ref 2.5–4.5)
PLATELET # BLD AUTO: 25 10E9/L (ref 150–450)
POTASSIUM SERPL-SCNC: 3.6 MMOL/L (ref 3.4–5.3)
PROT SERPL-MCNC: 6.9 G/DL (ref 6.8–8.8)
RBC # BLD AUTO: 3.24 10E12/L (ref 4.4–5.9)
SODIUM SERPL-SCNC: 140 MMOL/L (ref 133–144)
URATE SERPL-MCNC: 1.1 MG/DL (ref 3.5–7.2)
WBC # BLD AUTO: 0.2 10E9/L (ref 4–11)

## 2020-08-01 PROCEDURE — 80053 COMPREHEN METABOLIC PANEL: CPT | Performed by: STUDENT IN AN ORGANIZED HEALTH CARE EDUCATION/TRAINING PROGRAM

## 2020-08-01 PROCEDURE — 25000128 H RX IP 250 OP 636: Performed by: STUDENT IN AN ORGANIZED HEALTH CARE EDUCATION/TRAINING PROGRAM

## 2020-08-01 PROCEDURE — 25000125 ZZHC RX 250: Performed by: PEDIATRICS

## 2020-08-01 PROCEDURE — 25000128 H RX IP 250 OP 636: Performed by: PEDIATRICS

## 2020-08-01 PROCEDURE — 20600000 ZZH R&B BMT

## 2020-08-01 PROCEDURE — 87103 BLOOD FUNGUS CULTURE: CPT | Performed by: STUDENT IN AN ORGANIZED HEALTH CARE EDUCATION/TRAINING PROGRAM

## 2020-08-01 PROCEDURE — 82962 GLUCOSE BLOOD TEST: CPT

## 2020-08-01 PROCEDURE — 82330 ASSAY OF CALCIUM: CPT | Performed by: PHYSICIAN ASSISTANT

## 2020-08-01 PROCEDURE — 84100 ASSAY OF PHOSPHORUS: CPT | Performed by: STUDENT IN AN ORGANIZED HEALTH CARE EDUCATION/TRAINING PROGRAM

## 2020-08-01 PROCEDURE — 25000132 ZZH RX MED GY IP 250 OP 250 PS 637: Performed by: STUDENT IN AN ORGANIZED HEALTH CARE EDUCATION/TRAINING PROGRAM

## 2020-08-01 PROCEDURE — 25800030 ZZH RX IP 258 OP 636: Performed by: PHYSICIAN ASSISTANT

## 2020-08-01 PROCEDURE — 84550 ASSAY OF BLOOD/URIC ACID: CPT | Performed by: STUDENT IN AN ORGANIZED HEALTH CARE EDUCATION/TRAINING PROGRAM

## 2020-08-01 PROCEDURE — 25800030 ZZH RX IP 258 OP 636: Performed by: PEDIATRICS

## 2020-08-01 PROCEDURE — 87040 BLOOD CULTURE FOR BACTERIA: CPT | Performed by: STUDENT IN AN ORGANIZED HEALTH CARE EDUCATION/TRAINING PROGRAM

## 2020-08-01 PROCEDURE — 85027 COMPLETE CBC AUTOMATED: CPT

## 2020-08-01 RX ORDER — SODIUM CHLORIDE 9 MG/ML
INJECTION, SOLUTION INTRAVENOUS
Status: DISPENSED
Start: 2020-08-01 | End: 2020-08-02

## 2020-08-01 RX ADMIN — Medication 150 MG: at 20:19

## 2020-08-01 RX ADMIN — VALACYCLOVIR HYDROCHLORIDE 1000 MG: 500 TABLET, FILM COATED ORAL at 15:03

## 2020-08-01 RX ADMIN — PANTOPRAZOLE SODIUM 40 MG: 20 TABLET, DELAYED RELEASE ORAL at 08:09

## 2020-08-01 RX ADMIN — CEFEPIME 2000 MG: 2 INJECTION, POWDER, FOR SOLUTION INTRAVENOUS at 15:05

## 2020-08-01 RX ADMIN — VALACYCLOVIR HYDROCHLORIDE 1000 MG: 500 TABLET, FILM COATED ORAL at 20:19

## 2020-08-01 RX ADMIN — PHYTONADIONE: 1 INJECTION, EMULSION INTRAMUSCULAR; INTRAVENOUS; SUBCUTANEOUS at 20:19

## 2020-08-01 RX ADMIN — I.V. FAT EMULSION 250 ML: 20 EMULSION INTRAVENOUS at 20:19

## 2020-08-01 RX ADMIN — Medication 150 MG: at 08:09

## 2020-08-01 RX ADMIN — CEFEPIME 2000 MG: 2 INJECTION, POWDER, FOR SOLUTION INTRAVENOUS at 08:08

## 2020-08-01 RX ADMIN — MICAFUNGIN SODIUM 50 MG: 10 INJECTION, POWDER, LYOPHILIZED, FOR SOLUTION INTRAVENOUS at 18:35

## 2020-08-01 RX ADMIN — CEFEPIME 2000 MG: 2 INJECTION, POWDER, FOR SOLUTION INTRAVENOUS at 01:10

## 2020-08-01 RX ADMIN — SODIUM CHLORIDE: 900 INJECTION INTRAVENOUS at 20:20

## 2020-08-01 RX ADMIN — VALACYCLOVIR HYDROCHLORIDE 1000 MG: 500 TABLET, FILM COATED ORAL at 08:09

## 2020-08-01 RX ADMIN — Medication 150 MG: at 15:05

## 2020-08-01 NOTE — PLAN OF CARE
AF. VSS. LSC. No pain. No nausea. Eating some today. Voiding well. No stool. Hourly rounding done. Continue POC.

## 2020-08-01 NOTE — PROGRESS NOTES
Pediatric BMT Daily Progress Note    Interval Events:  Febrile last night.  Blood cultures remain NGTD.   No airway concerns and hemodynamically stable.  occasional hypotension with sleeping but no interventions needs and stable with past admissions.     Review of Systems: Pertinent positives include those mentioned in interval events. A complete review of systems was performed and is otherwise negative.      Medications:  Please see MAR    Physical Exam:  Temp:  [98.4  F (36.9  C)-100.5  F (38.1  C)] 98.6  F (37  C)  Pulse:  [59-94] 80  Resp:  [18-22] 20  BP: ()/(43-69) 100/63  SpO2:  [85 %-100 %] 100 %  I/O last 3 completed shifts:  In: 2274.6 [I.V.:525]  Out: 2150 [Urine:2150]  GEN: sitting up in bed, no acute distress, pale. Mom present  HEENT: Full head of hair, normocephalic, sclerae clear,  nares patent, OP with mild erythema and no visible lesions.  CARD: RRR, normal S1/S2 without murmur.   RESP: Lungs CTA bilaterally without adventitious lung sounds  ABD: Soft NT, ND, no organomegaly.  EXTREM: warm and well perfused.   SKIN: Pale throughout, clear without erythema, rash or lesions.  ACCESS: CVC and port    Labs:  Labs reviewed, pertinent findings WBC 0.2, Hb 9.4 platelets 25,000. 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 CD19 positive (dim).      Isael completed non-myleoablative chemotherapy prep as an outpatient. He was admitted for fever on 7/24, blood cultures no growth to date.  Kymriah infusion 7/27 uneventful. Febrile. Mild throat pain but not using oxycodone. Blood product transfusion reactions, will only use steroid for severe reaction. Appetite minimal, nutritional support with TPN.  Day +5     ALL and BMT: High risk B cell ALL (CNS negative) + JAK2 activation.  - Related bone  marrow transplant 11/2/18  - Relapsed 6/5/2020, 19 months status post matched related BMT per [id number] (cytoxan/TBI arm) with CD19 (dim) relapsed b-cell lymphoblastic leukemia.  - Completed apheresis collection in the [facility] (6/2020).   - He has been receiving bridging chemotherapy with oral 6MP, methotrexate and ruxolitinib. All oral chemotherapy stopped on 7/12.   - BM revealed 75% involvement with leukemia. CSF was negative.   - Outpatient lymphodepleting chemotherapy per [id number] with cytoxan and fludarabine 7/21-7/24 completed as an outpatient    Kymriah infusion 7/27   - Pre-meds of Tylenol and Benadryl   - D5% 1/2NS 2 hours prior to Kymriah infusion continue 30 minute during infusion and 6 hours post   - No steroid post kymriah unless needed for severe blood product transfusion reaction.      BMT Daily CARTOX Note (complete days 0-14 and with any subsequent CRS/neurotoxicity)    Date: July 30, 2020    Artemio Nino ([id number]) is a 20 year old year old male who received infusion on 7/27/2020, currently day +3 of CAR-T cell therapy Kymriah    Vital Signs: /63   Pulse 80   Temp 98.6  F (37  C) (Oral)   Resp 20   Wt 59 kg (130 lb 1.1 oz)   SpO2 100%   BMI 19.16 kg/m      Organ CAR-T toxicity:    Cardiac: hypotension < 100/60 (mild hypotension at times no intervention needed and stable for his baseline blood pressure range prior to admission)    Pulmonary: No   Renal: No   Liver: No   Coagulopathy: No   Neurologic: No   Immune: No    Overall CRS grade Grade 2  (mild fever and mild hypotension with no need for support)    CRS Parameter Grade 1 Grade 2 Grade 3 Grade 4   Fever* Tm >= 100.4 degrees F Tm >= 100.4 degrees F Tm >= 100.4 degrees F Tm >= to 100.4 degrees F       With Either:  Hypotension None Responsive to Fluids Requiring 1 vasopressor (w/ or w/o vasopressin) Requiring multiple vasopressors (excluding vasopressin)     And/or  Hypoxia None Low-flow nasal cannula or blow-by  High-flow nasal cannula, face mask, non-rebreather mask, or Venturi mask Requiring positive pressure (CPAP, BiPAP intubation and mechanical ventilation)       * Definition of High Dose Pressors for Grade 4  Vasopressor Duration >= 3 hours   Norepinephrine monotherapy >= 20 mcg/kg/minute   Dopamine monotherapy >= 10 mcg/kg/minute   Phenylephrine monotherapy >= 200 mcg/minute   Epinephrine monotherapy >= 10 mcg/minute   If on vasopressin + another agent High dose of vasopressin + norepi equivalent (using VASST formula below) is >= 10 mcg/minute   If on combination vasopressors, not including vasopressin Norepi equivalent of > 20 mcg/minute (using VASST formula below)     VASST formula: Norepi equivalent dose = [NE (mcg/min)] + [dopamine (mcg/min) / 2] + [epinephrine (mcg/min)] + [phenylephrine (mcg/min) / 10]    ICE Assessment  Orientation to year, month, city, hospital: 4 points  Name 3 objects (e.g., point to clock, pen, button): 3 points  Following commands: (e.g., Show me 2 fingers): 1 point   Write a standard sentence (e.g., The pickett jumped over the log): 1 point   Count backwards from 100 by ten: 1 point  Total points: 10: No impairment    CRS Summary  Does patient have CRS? Yes, date of onset: 7/28/2020  Current CRS stgstrstastdstest:st st1st What is the maximum severity to date: Grade 2 (fever and hypotension)  What therapy was given: IV antibiotics  Has CRS grade changed? Yes, new grade and date of change: 7/31/2020  Has CRS resolved? No - febrile in last 24 hours, (date of resolution - NA)    Neurotoxicity Summary  Does patient have neurotoxicity? No  Current Neurotoxicity score (0-10): 0  What is the maximum severity to date:  0  What therapy was given: N/A  Has neurotoxicity resolved? N/A    BMT Daily CARTOX Note (complete days 0-14 and with any subsequent CRS/neurotoxicity)    FEN/Renal:   # Risk for malnutrition: Appetite remains decreased  - Continue nutritional support with TPN/IL x 24h  - Age appropriate diet   -  Monitor nutritional intake     # Risk for electrolyte abnormalities:  - Check daily electrolytes     # Risk for renal dysfunction/risk for ELIZ secondary to potential tumor lysis with preparatory chemotherapy.: GFR 133ml/min  - Daily electrolyte monitoring  - Monitor daily weights     # Risk for TLS: Uric Acid 1.1today.  - Continue Allopurinol TID  - will decrease to daily TLS labs      Pulmonary:  # Risk for pulmonary insufficiency: No current concerns. Normal chest and sinus CTs during work-up.      Cardiovascular:  Aaron-Parkinson-White Syndrome: diagnosed upon syncope in 02/2018. Asymptomatic, no interventions to date. No current concerns.  - EKG 7/15 revealed WPW with normal sinus rhythm.   - Echocardiogram 7/15 showed LVEF of 62%  - Cardiology to decide if they want to assess him.     Heme:   # Pancytopenia secondary to chemotherapy:  - Transfuse for hemoglobin < 7, platelets < 10,000   -  Mild hives noted at end of pRBC transfusion this 7/25. Premedicate with Tylenol and Benadryl for pRBC transfusions.   - Recent history of significant hives with platelet transfusions. Was being premedicated with hydrocortisone, tylenol, and benadryl for platelets. However, no hydrocortisone premedication ordered now given CAR-T. If severe reaction then ok to give steroids per Dr. Zavala.   - No GCSF per protocol     Infectious Disease:  # Febrile Neutropenia: Admitted for fever. Afebrile x 24h. Discontinue vancomycin.  Continue Cefepime and follow blood cultures closely  - Intermittent decreases in BPs since prior to kymriah infsuion but overall hemodynamically stable.  - Covid negative 7/17 and 7/24     # Risk for infection given immunocompromised status:    Active: See above  Prophylaxis:                                                                      - Viral prophylaxis: HD Valtrex continue through Day +30  - Fungal prophylaxis: Micafungin continue through Day +30. History of photoxic drug eruption with  voriconazole, avoid use.   - Bacterial prophylaxis: Levo on hold, continue cefepime IV neutropenic. Fevers maybe related to his leukemia   - PCP prophylaxis: Bactrim to begin day +28      GI:   # Nausea:  - Scheduled medications: Kytril BID - discontinued per patient request 7/26  - PRN medications: Benadryl PRN     # Acid reflux:  Protonix once daily. Continue TUMS prn.     # Transaminitis:  Possibly related to recent chemotherapy. May warrant RUQ US to further assess persistent transamnitis.    - Downtrending. ALT and AST stable today      # Abdominal pain and diarrhea: One episode overnight 7/22  - Consider stool collection if frequency increases     Neuro:  # Mucositis/pain: Throat pain intermittent he says and is improving.   - Oxycodone PRN available  - Tramadol prn  - Covid negative 7/17 and 7/24     Oral Hygiene:   # Receding gums, risk for infection  - Continue chlorhexidine mouth wash for symptomatic control, pain now improved     Derm:  No current rash. He is no longer using creams.     Fertility:  Sperm collected pre chemotherapy for fertility preservation      Discharge Considerations: Expected lengths of hospitalization for patients with complications from stem cell transplant vary based on the complication(s) and severity(ies). A typical stay is 7 days.     The above plan of care was developed by and communicated to me by the Pediatric BMT attending physician, MD Judy Tai, CNP  Pediatric Nurse Practitioner  Pediatric Blood and Marrow Transplant  [phone] - Pager  [phone] - BMT workroom  [phone] - BMT Clinic    Pediatric BMT Inpatient Attending Note:    Artemio was seen and evaluated by me today.       The significant interval history includes: low grade fever overnight, some low blood pressures not requiring fluid resuscitation, otherwise hemodynamically stable, on room air, no transfusions today. Blood cultures continue to be negative. We continue to  monitor him closely for a clinical change for concerns of tumor lysis and cytokine release syndrome. His mouth/throat pain is stable. He continues to take little orally. He remains neutropenic.  We will maintain his antibiotics and TPN.      I have reviewed the vital signs, medications and lab results.  I assisted in formulating a plan, which was instituted by the BMT team. The total amount of time spent in the admission and care of Artemio Nino today was >40 minutes, at least 50% of which was counseling and coordination of care.     Sid Girard MD    Pediatric Blood and Marrow Transplant   [facility]  Pager: [phone]         Patient Active Problem List   Diagnosis     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)     Aaron-Parkinson-White (WPW) syndrome     Bone marrow transplant candidate     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)     At risk for infection     S/P allogeneic bone marrow transplant (H)     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

## 2020-08-01 NOTE — PLAN OF CARE
Febrile, maximum temp 100.5. Cultures drawn. Temp went down to 99.5 without interventions. BP 80s/40-50s. Pulse 80s. Sats high 90s on RA. One desat down to 85% in the morning, rebounded to high 90s without interventions. Neuro WDL, interacting with RN appropriately No complaint of pain or nausea. Lung sounds diminished overall,upper lobes sounds slightly more clear than the rest. Voids well. No stool. No PRNs or replacements needed. Continue POC.

## 2020-08-01 NOTE — PLAN OF CARE
Temp max=99.7 vital signs stable. Denies pain or nausea. Tolerating oral meds fine. Receiving continuous TPN  See flowsheet for lab results. Mother present, supportive of patient.

## 2020-08-02 LAB
ABO + RH BLD: NORMAL
ABO + RH BLD: NORMAL
ALBUMIN SERPL-MCNC: 3.2 G/DL (ref 3.4–5)
ALP SERPL-CCNC: 64 U/L (ref 40–150)
ALT SERPL W P-5'-P-CCNC: 49 U/L (ref 0–70)
ANION GAP SERPL CALCULATED.3IONS-SCNC: 7 MMOL/L (ref 3–14)
AST SERPL W P-5'-P-CCNC: 15 U/L (ref 0–45)
BILIRUB SERPL-MCNC: 0.8 MG/DL (ref 0.2–1.3)
BLD GP AB SCN SERPL QL: NORMAL
BLOOD BANK CMNT PATIENT-IMP: NORMAL
BUN SERPL-MCNC: 18 MG/DL (ref 7–30)
CA-I BLD-MCNC: 4.1 MG/DL (ref 4.4–5.2)
CALCIUM SERPL-MCNC: 8.6 MG/DL (ref 8.5–10.1)
CHLORIDE SERPL-SCNC: 106 MMOL/L (ref 94–109)
CO2 SERPL-SCNC: 25 MMOL/L (ref 20–32)
CREAT SERPL-MCNC: 0.63 MG/DL (ref 0.66–1.25)
DIFFERENTIAL METHOD BLD: ABNORMAL
ERYTHROCYTE [DISTWIDTH] IN BLOOD BY AUTOMATED COUNT: 13.1 % (ref 10–15)
GFR SERPL CREATININE-BSD FRML MDRD: >90 ML/MIN/{1.73_M2}
GLUCOSE SERPL-MCNC: 113 MG/DL (ref 70–99)
HCT VFR BLD AUTO: 27.1 % (ref 40–53)
HGB BLD-MCNC: 9.4 G/DL (ref 13.3–17.7)
MCH RBC QN AUTO: 28.4 PG (ref 26.5–33)
MCHC RBC AUTO-ENTMCNC: 34.7 G/DL (ref 31.5–36.5)
MCV RBC AUTO: 82 FL (ref 78–100)
PHOSPHATE SERPL-MCNC: 4.1 MG/DL (ref 2.5–4.5)
PLATELET # BLD AUTO: 19 10E9/L (ref 150–450)
POTASSIUM SERPL-SCNC: 3.4 MMOL/L (ref 3.4–5.3)
PROT SERPL-MCNC: 7.2 G/DL (ref 6.8–8.8)
RBC # BLD AUTO: 3.31 10E12/L (ref 4.4–5.9)
SODIUM SERPL-SCNC: 138 MMOL/L (ref 133–144)
SPECIMEN EXP DATE BLD: NORMAL
TRIGL SERPL-MCNC: 36 MG/DL
TRIGL SERPL-MCNC: NORMAL MG/DL
URATE SERPL-MCNC: 1 MG/DL (ref 3.5–7.2)
WBC # BLD AUTO: 0.2 10E9/L (ref 4–11)

## 2020-08-02 PROCEDURE — 84100 ASSAY OF PHOSPHORUS: CPT | Performed by: STUDENT IN AN ORGANIZED HEALTH CARE EDUCATION/TRAINING PROGRAM

## 2020-08-02 PROCEDURE — 80053 COMPREHEN METABOLIC PANEL: CPT | Performed by: STUDENT IN AN ORGANIZED HEALTH CARE EDUCATION/TRAINING PROGRAM

## 2020-08-02 PROCEDURE — 25000128 H RX IP 250 OP 636: Performed by: STUDENT IN AN ORGANIZED HEALTH CARE EDUCATION/TRAINING PROGRAM

## 2020-08-02 PROCEDURE — 25000128 H RX IP 250 OP 636: Performed by: PEDIATRICS

## 2020-08-02 PROCEDURE — 85027 COMPLETE CBC AUTOMATED: CPT

## 2020-08-02 PROCEDURE — 84550 ASSAY OF BLOOD/URIC ACID: CPT | Performed by: STUDENT IN AN ORGANIZED HEALTH CARE EDUCATION/TRAINING PROGRAM

## 2020-08-02 PROCEDURE — 86850 RBC ANTIBODY SCREEN: CPT | Performed by: STUDENT IN AN ORGANIZED HEALTH CARE EDUCATION/TRAINING PROGRAM

## 2020-08-02 PROCEDURE — 82330 ASSAY OF CALCIUM: CPT | Performed by: NURSE PRACTITIONER

## 2020-08-02 PROCEDURE — 25800030 ZZH RX IP 258 OP 636: Performed by: PEDIATRICS

## 2020-08-02 PROCEDURE — 84478 ASSAY OF TRIGLYCERIDES: CPT | Performed by: PEDIATRICS

## 2020-08-02 PROCEDURE — 86900 BLOOD TYPING SEROLOGIC ABO: CPT | Performed by: STUDENT IN AN ORGANIZED HEALTH CARE EDUCATION/TRAINING PROGRAM

## 2020-08-02 PROCEDURE — 86901 BLOOD TYPING SEROLOGIC RH(D): CPT | Performed by: STUDENT IN AN ORGANIZED HEALTH CARE EDUCATION/TRAINING PROGRAM

## 2020-08-02 PROCEDURE — 25000132 ZZH RX MED GY IP 250 OP 250 PS 637: Performed by: STUDENT IN AN ORGANIZED HEALTH CARE EDUCATION/TRAINING PROGRAM

## 2020-08-02 PROCEDURE — 20600000 ZZH R&B BMT

## 2020-08-02 PROCEDURE — 25000125 ZZHC RX 250: Performed by: PEDIATRICS

## 2020-08-02 RX ADMIN — PHYTONADIONE: 1 INJECTION, EMULSION INTRAMUSCULAR; INTRAVENOUS; SUBCUTANEOUS at 21:04

## 2020-08-02 RX ADMIN — Medication 150 MG: at 07:24

## 2020-08-02 RX ADMIN — PANTOPRAZOLE SODIUM 40 MG: 20 TABLET, DELAYED RELEASE ORAL at 07:24

## 2020-08-02 RX ADMIN — MICAFUNGIN SODIUM 50 MG: 10 INJECTION, POWDER, LYOPHILIZED, FOR SOLUTION INTRAVENOUS at 21:14

## 2020-08-02 RX ADMIN — VALACYCLOVIR HYDROCHLORIDE 1000 MG: 500 TABLET, FILM COATED ORAL at 07:24

## 2020-08-02 RX ADMIN — Medication 150 MG: at 13:10

## 2020-08-02 RX ADMIN — CEFEPIME 2000 MG: 2 INJECTION, POWDER, FOR SOLUTION INTRAVENOUS at 16:29

## 2020-08-02 RX ADMIN — I.V. FAT EMULSION 250 ML: 20 EMULSION INTRAVENOUS at 21:04

## 2020-08-02 RX ADMIN — Medication 150 MG: at 19:57

## 2020-08-02 RX ADMIN — CEFEPIME 2000 MG: 2 INJECTION, POWDER, FOR SOLUTION INTRAVENOUS at 00:19

## 2020-08-02 RX ADMIN — VALACYCLOVIR HYDROCHLORIDE 1000 MG: 500 TABLET, FILM COATED ORAL at 13:10

## 2020-08-02 RX ADMIN — HEPARIN, PORCINE (PF) 10 UNIT/ML INTRAVENOUS SYRINGE 5 ML: at 20:05

## 2020-08-02 RX ADMIN — HEPARIN, PORCINE (PF) 10 UNIT/ML INTRAVENOUS SYRINGE 5 ML: at 19:58

## 2020-08-02 RX ADMIN — VALACYCLOVIR HYDROCHLORIDE 1000 MG: 500 TABLET, FILM COATED ORAL at 19:57

## 2020-08-02 RX ADMIN — CEFEPIME 2000 MG: 2 INJECTION, POWDER, FOR SOLUTION INTRAVENOUS at 07:24

## 2020-08-02 NOTE — PROGRESS NOTES
Pediatric BMT Daily Progress Note    Interval Events:  No fevers in the last 24 hours. No airway concerns and hemodynamically stable.  Eating small amounts. No nausea or vomiting.     Review of Systems: Pertinent positives include those mentioned in interval events. A complete review of systems was performed and is otherwise negative.      Medications:  Please see MAR    Physical Exam:  Temp:  [98.6  F (37  C)-99.7  F (37.6  C)] 99.3  F (37.4  C)  Pulse:  [77-87] 87  Heart Rate:  [85] 85  Resp:  [20-22] 20  BP: ()/(53-65) 95/62  SpO2:  [98 %-100 %] 100 %  I/O last 3 completed shifts:  In: 2022.4 [I.V.:440]  Out: 2520 [Urine:2520]  GEN: Lying in bed, no acute distress, pale.   HEENT: Full head of hair, normocephalic, sclerae clear,  nares patent, OP with mild erythema and no visible lesions.  CARD: RRR, normal S1/S2 without murmur.   RESP: Lungs CTA bilaterally without adventitious lung sounds  ABD: Soft NT, ND, no organomegaly.  EXTREM: warm and well perfused.   SKIN: Pale throughout, clear without erythema, rash or lesions.  ACCESS: CVC and port    Labs:  Labs reviewed, pertinent findings WBC 0.2, Hb 9.4, platelets 19K. 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 CD19 positive (dim).      Isael completed lymphodepleting chemotherapy prep as an outpatient. He was admitted for fever on 7/24, blood cultures no growth to date.  He is now day +6 s/p Kymriah infusion.      ALL and BMT: High risk B cell ALL (CNS negative) + JAK2 activation.  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  - Completed apheresis collection in the [facility] (6/2020).   - He has  been receiving bridging chemotherapy with oral 6MP, methotrexate and ruxolitinib. All oral chemotherapy stopped on 7/12.   - BM revealed 75% involvement with leukemia. CSF was negative.   - Outpatient lymphodepleting chemotherapy per [id number] with cytoxan and fludarabine 7/21-7/24 completed as an outpatient    Kymriah infusion 7/27   - Pre-meds of Tylenol and Benadryl   - D5% 1/2NS 2 hours prior to Kymriah infusion continue 30 minute during infusion and 6 hours post   - No steroid post kymriah unless needed for severe blood product transfusion reaction.      BMT Daily CARTOX Note (complete days 0-14 and with any subsequent CRS/neurotoxicity)    Date: August 2, 2020    Artemio Nino ([id number]) is a 20 year old year old male who received infusion on 7/27/20, currently day +6 of CAR-T cell therapy Kymriah    Vital Signs: /56   Pulse 77   Temp 99  F (37.2  C) (Oral)   Resp 18   Wt 59 kg (130 lb 1.1 oz)   SpO2 100%   BMI 19.16 kg/m      Organ CAR-T toxicity:    Cardiac: No   Pulmonary: No   Renal: No   Liver: No   Coagulopathy: No   Neurologic: No   Immune: No    Overall CRS grade Grade 1    CRS Parameter Grade 1 Grade 2 Grade 3 Grade 4   Fever* Tm >= 100.4 degrees F Tm >= 100.4 degrees F Tm >= 100.4 degrees F Tm >= to 100.4 degrees F       With Either:  Hypotension None Responsive to Fluids Requiring 1 vasopressor (w/ or w/o vasopressin) Requiring multiple vasopressors (excluding vasopressin)     And/or  Hypoxia None Low-flow nasal cannula or blow-by High-flow nasal cannula, face mask, non-rebreather mask, or Venturi mask Requiring positive pressure (CPAP, BiPAP intubation and mechanical ventilation)       * Definition of High Dose Pressors for Grade 4  Vasopressor Duration >= 3 hours   Norepinephrine monotherapy >= 20 mcg/kg/minute   Dopamine monotherapy >= 10 mcg/kg/minute   Phenylephrine monotherapy >= 200 mcg/minute   Epinephrine monotherapy >= 10 mcg/minute   If on vasopressin + another agent  High dose of vasopressin + norepi equivalent (using VASST formula below) is >= 10 mcg/minute   If on combination vasopressors, not including vasopressin Norepi equivalent of > 20 mcg/minute (using VASST formula below)     VASST formula: Norepi equivalent dose = [NE (mcg/min)] + [dopamine (mcg/min) / 2] + [epinephrine (mcg/min)] + [phenylephrine (mcg/min) / 10]    ICE Assessment  Orientation to year, month, city, hospital: 4 points  Name 3 objects (e.g., point to clock, pen, button): 3 points  Following commands: (e.g., Show me 2 fingers): 1 point   Write a standard sentence (e.g., The pickett jumped over the log): 1 point   Count backwards from 100 by ten: 1 point  Total points: 10: No impairment    CRS Summary  Does patient have CRS? Yes, date of onset: 7/28/20  Current CRS stgstrstastdstest:st st1st What is the maximum severity to date: Grade 2 (fever and hypotension)  What therapy was given: No  Has CRS grade changed? No  Has CRS resolved? Yes, date of resolution: 8/1/20    Neurotoxicity Summary  Does patient have neurotoxicity? No  Current Neurotoxicity score (0-10): 0  What is the maximum severity to date:  0  What therapy was given: No  Has neurotoxicity resolved? N/A    FEN/Renal:   # Risk for malnutrition: Appetite remains decreased  - Continue nutritional support with TPN/IL x 24h  - Age appropriate diet   - Monitor nutritional intake     # Risk for electrolyte abnormalities:  - Check daily electrolytes     # Risk for renal dysfunction/risk for ELIZ secondary to potential tumor lysis with preparatory chemotherapy: GFR 133ml/min  - Daily electrolyte monitoring  - Monitor daily weights     # Risk for TLS: Uric Acid 1.0 today.  - Continue Allopurinol TID  - Daily TLS labs     Pulmonary:  # Risk for pulmonary insufficiency: No current concerns. Normal chest and sinus CTs during work-up.      Cardiovascular:  # Aaron-Parkinson-White Syndrome: diagnosed upon syncope in 02/2018. Asymptomatic, no interventions to date. No current  concerns.  - EKG 7/15 revealed WPW with normal sinus rhythm.   - Echocardiogram 7/15 showed LVEF of 62%  - Cardiology to decide if they want to assess him.     Heme:   # Pancytopenia secondary to chemotherapy:  - Transfuse for hemoglobin < 7, platelets < 10,000   -  Mild hives noted at end of pRBC transfusion on 7/25. Premedicate with Tylenol and Benadryl for pRBC transfusions.   - Recent history of significant hives with platelet transfusions. Was being premedicated with hydrocortisone, tylenol, and benadryl for platelets. However, no hydrocortisone premedication ordered now given CAR-T. If severe reaction then ok to give steroids per Dr. Zavala.   - No GCSF per protocol     Infectious Disease:  # Recent Febrile Neutropenia: Admitted for fever. Afebrile x 48h. S/p vancomycin.  Continue Cefepime and follow blood cultures closely  - Covid negative 7/17 and 7/24     # Risk for infection given immunocompromised status:    Active: See above  Prophylaxis:                                                                      - Viral prophylaxis: HD Valtrex continue through Day +30  - Fungal prophylaxis: Micafungin continue through Day +30. History of photoxic drug eruption with voriconazole, avoid use.   - Bacterial prophylaxis: None  - PCP prophylaxis: Bactrim to begin day +28      GI:   # Nausea:  - Scheduled medications: Kytril BID - discontinued per patient request 7/26  - PRN medications: Benadryl PRN     # Acid reflux:  Protonix once daily. Continue TUMS prn.     # Recent Transaminitis:  Possibly related to recent chemotherapy. May warrant RUQ US to further assess persistent transamnitis.       Neuro:  # Mucositis/pain: Throat pain intermittent he says and is improving.   - Oxycodone PRN available  - Tramadol prn  - Covid negative 7/17 and 7/24     Oral Hygiene:   # Receding gums, risk for infection  - Continue chlorhexidine mouth wash for symptomatic control, pain now improved     Derm:  No current rash. He is no  longer using creams.     Fertility:  Sperm collected pre chemotherapy for fertility preservation      Discharge Considerations: Expected lengths of hospitalization for patients with complications from stem cell transplant vary based on the complication(s) and severity(ies). A typical stay is 7-14 days.     The above plan of care was developed by and communicated to me by the Pediatric BMT attending physician, MD Jaquelin Tai MD  BMT [facility]    Pediatric BMT Inpatient Attending Note:    Artemio was seen and evaluated by me today.       The significant interval history includes: afebrile now, vitals stable, no concerns with hemodynamic instability, continue to moniitor, on room air, no transfusions today. Blood cultures continue to be negative. We continue to monitor him closely for a clinical change for concerns of tumor lysis and cytokine release syndrome as he is in the high risk period. His mouth/throat pain is stable. He continues to take little orally. He remains neutropenic.  We will maintain his antibiotics and TPN.      I have reviewed the vital signs, medications and lab results.  I assisted in formulating a plan, which was instituted by the BMT team. The total amount of time spent in the admission and care of Artemio Nino today was >40 minutes, at least 50% of which was counseling and coordination of care.     Sid Girard MD    Pediatric Blood and Marrow Transplant   [facility]  Pager: [phone]         Patient Active Problem List   Diagnosis     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)     Aaron-Parkinson-White (WPW) syndrome     Bone marrow transplant candidate     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)     At risk for infection     S/P allogeneic bone marrow transplant (H)     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

## 2020-08-02 NOTE — PLAN OF CARE
Af. VSS. LSC. No pain. No nausea. Eating well. Voiding well. No stool. Parents at bedside. Hourly rounding done.

## 2020-08-02 NOTE — PLAN OF CARE
Artemio's tmax was 99.7. BPs 90s/50s-60s. OVSS. Lung sounds clear, diminished in bases. Denied pain and nausea. No stool. Good UOP. No replacements needed. Voiced frustration at not sleeping well through nights in general. Otherwise appropriate. No family at bedside. Hourly rounding completed. Continue to monitor and follow plan of care.

## 2020-08-02 NOTE — PLAN OF CARE
Afebrile. VSS. Lungs clear. No c/o pain, nausea, or vomiting. Patient and mother updated on plan of care

## 2020-08-03 LAB
ALBUMIN SERPL-MCNC: 3.1 G/DL (ref 3.4–5)
ALBUMIN SERPL-MCNC: 3.1 G/DL (ref 3.4–5)
ALP SERPL-CCNC: 61 U/L (ref 40–150)
ALP SERPL-CCNC: 63 U/L (ref 40–150)
ALT SERPL W P-5'-P-CCNC: 49 U/L (ref 0–70)
ALT SERPL W P-5'-P-CCNC: 51 U/L (ref 0–70)
ANION GAP SERPL CALCULATED.3IONS-SCNC: 4 MMOL/L (ref 3–14)
ANION GAP SERPL CALCULATED.3IONS-SCNC: 5 MMOL/L (ref 3–14)
APTT PPP: 35 SEC (ref 22–37)
AST SERPL W P-5'-P-CCNC: 13 U/L (ref 0–45)
AST SERPL W P-5'-P-CCNC: 15 U/L (ref 0–45)
BACTERIA SPEC CULT: NO GROWTH
BACTERIA SPEC CULT: NO GROWTH
BILIRUB SERPL-MCNC: 0.7 MG/DL (ref 0.2–1.3)
BILIRUB SERPL-MCNC: 0.7 MG/DL (ref 0.2–1.3)
BUN SERPL-MCNC: 20 MG/DL (ref 7–30)
BUN SERPL-MCNC: 21 MG/DL (ref 7–30)
CA-I BLD-MCNC: 4.9 MG/DL (ref 4.4–5.2)
CALCIUM SERPL-MCNC: 8.6 MG/DL (ref 8.5–10.1)
CALCIUM SERPL-MCNC: 8.9 MG/DL (ref 8.5–10.1)
CHLORIDE SERPL-SCNC: 108 MMOL/L (ref 94–109)
CHLORIDE SERPL-SCNC: 108 MMOL/L (ref 94–109)
CO2 SERPL-SCNC: 25 MMOL/L (ref 20–32)
CO2 SERPL-SCNC: 27 MMOL/L (ref 20–32)
CREAT SERPL-MCNC: 0.57 MG/DL (ref 0.66–1.25)
CREAT SERPL-MCNC: 0.64 MG/DL (ref 0.66–1.25)
CRP SERPL-MCNC: 39.5 MG/L (ref 0–8)
D DIMER PPP FEU-MCNC: <0.3 UG/ML FEU (ref 0–0.5)
DIFFERENTIAL METHOD BLD: ABNORMAL
DIFFERENTIAL METHOD BLD: ABNORMAL
ERYTHROCYTE [DISTWIDTH] IN BLOOD BY AUTOMATED COUNT: 12.7 % (ref 10–15)
ERYTHROCYTE [DISTWIDTH] IN BLOOD BY AUTOMATED COUNT: 13 % (ref 10–15)
FERRITIN SERPL-MCNC: 1774 NG/ML (ref 26–388)
FIBRINOGEN PPP-MCNC: 460 MG/DL (ref 200–420)
GFR SERPL CREATININE-BSD FRML MDRD: >90 ML/MIN/{1.73_M2}
GFR SERPL CREATININE-BSD FRML MDRD: >90 ML/MIN/{1.73_M2}
GLUCOSE SERPL-MCNC: 101 MG/DL (ref 70–99)
GLUCOSE SERPL-MCNC: 121 MG/DL (ref 70–99)
HCT VFR BLD AUTO: 25.4 % (ref 40–53)
HCT VFR BLD AUTO: 26.6 % (ref 40–53)
HGB BLD-MCNC: 9 G/DL (ref 13.3–17.7)
HGB BLD-MCNC: 9.2 G/DL (ref 13.3–17.7)
INR PPP: 1.22 (ref 0.86–1.14)
INR PPP: 1.22 (ref 0.86–1.14)
LDH SERPL L TO P-CCNC: 106 U/L (ref 85–227)
Lab: NORMAL
Lab: NORMAL
MAGNESIUM SERPL-MCNC: 1.9 MG/DL (ref 1.6–2.3)
MAGNESIUM SERPL-MCNC: 2 MG/DL (ref 1.6–2.3)
MCH RBC QN AUTO: 28.4 PG (ref 26.5–33)
MCH RBC QN AUTO: 28.8 PG (ref 26.5–33)
MCHC RBC AUTO-ENTMCNC: 34.6 G/DL (ref 31.5–36.5)
MCHC RBC AUTO-ENTMCNC: 35.4 G/DL (ref 31.5–36.5)
MCV RBC AUTO: 80 FL (ref 78–100)
MCV RBC AUTO: 83 FL (ref 78–100)
PHOSPHATE SERPL-MCNC: 4 MG/DL (ref 2.5–4.5)
PHOSPHATE SERPL-MCNC: 4.2 MG/DL (ref 2.5–4.5)
PLATELET # BLD AUTO: 13 10E9/L (ref 150–450)
PLATELET # BLD AUTO: 15 10E9/L (ref 150–450)
POTASSIUM SERPL-SCNC: 3.4 MMOL/L (ref 3.4–5.3)
POTASSIUM SERPL-SCNC: 3.8 MMOL/L (ref 3.4–5.3)
PREALB SERPL IA-MCNC: 22 MG/DL (ref 15–45)
PROT SERPL-MCNC: 7.2 G/DL (ref 6.8–8.8)
PROT SERPL-MCNC: 7.3 G/DL (ref 6.8–8.8)
RBC # BLD AUTO: 3.17 10E12/L (ref 4.4–5.9)
RBC # BLD AUTO: 3.19 10E12/L (ref 4.4–5.9)
SODIUM SERPL-SCNC: 138 MMOL/L (ref 133–144)
SODIUM SERPL-SCNC: 139 MMOL/L (ref 133–144)
SPECIMEN SOURCE: NORMAL
SPECIMEN SOURCE: NORMAL
URATE SERPL-MCNC: 1.1 MG/DL (ref 3.5–7.2)
URATE SERPL-MCNC: 1.1 MG/DL (ref 3.5–7.2)
WBC # BLD AUTO: 0.2 10E9/L (ref 4–11)
WBC # BLD AUTO: 0.2 10E9/L (ref 4–11)

## 2020-08-03 PROCEDURE — 25000132 ZZH RX MED GY IP 250 OP 250 PS 637: Performed by: STUDENT IN AN ORGANIZED HEALTH CARE EDUCATION/TRAINING PROGRAM

## 2020-08-03 PROCEDURE — 85025 COMPLETE CBC W/AUTO DIFF WBC: CPT | Performed by: PEDIATRICS

## 2020-08-03 PROCEDURE — 80053 COMPREHEN METABOLIC PANEL: CPT | Performed by: PEDIATRICS

## 2020-08-03 PROCEDURE — 84550 ASSAY OF BLOOD/URIC ACID: CPT | Performed by: STUDENT IN AN ORGANIZED HEALTH CARE EDUCATION/TRAINING PROGRAM

## 2020-08-03 PROCEDURE — 25000125 ZZHC RX 250: Performed by: PEDIATRICS

## 2020-08-03 PROCEDURE — 85610 PROTHROMBIN TIME: CPT | Performed by: STUDENT IN AN ORGANIZED HEALTH CARE EDUCATION/TRAINING PROGRAM

## 2020-08-03 PROCEDURE — 85730 THROMBOPLASTIN TIME PARTIAL: CPT | Performed by: PEDIATRICS

## 2020-08-03 PROCEDURE — 83735 ASSAY OF MAGNESIUM: CPT | Performed by: PEDIATRICS

## 2020-08-03 PROCEDURE — 20600000 ZZH R&B BMT

## 2020-08-03 PROCEDURE — 25800030 ZZH RX IP 258 OP 636: Performed by: PEDIATRICS

## 2020-08-03 PROCEDURE — 85379 FIBRIN DEGRADATION QUANT: CPT | Performed by: PEDIATRICS

## 2020-08-03 PROCEDURE — 86140 C-REACTIVE PROTEIN: CPT | Performed by: PEDIATRICS

## 2020-08-03 PROCEDURE — 25000128 H RX IP 250 OP 636: Performed by: STUDENT IN AN ORGANIZED HEALTH CARE EDUCATION/TRAINING PROGRAM

## 2020-08-03 PROCEDURE — 83615 LACTATE (LD) (LDH) ENZYME: CPT | Performed by: PEDIATRICS

## 2020-08-03 PROCEDURE — 85027 COMPLETE CBC AUTOMATED: CPT

## 2020-08-03 PROCEDURE — 83735 ASSAY OF MAGNESIUM: CPT | Performed by: STUDENT IN AN ORGANIZED HEALTH CARE EDUCATION/TRAINING PROGRAM

## 2020-08-03 PROCEDURE — 82330 ASSAY OF CALCIUM: CPT | Performed by: NURSE PRACTITIONER

## 2020-08-03 PROCEDURE — 84100 ASSAY OF PHOSPHORUS: CPT | Performed by: STUDENT IN AN ORGANIZED HEALTH CARE EDUCATION/TRAINING PROGRAM

## 2020-08-03 PROCEDURE — 85384 FIBRINOGEN ACTIVITY: CPT | Performed by: PEDIATRICS

## 2020-08-03 PROCEDURE — 82728 ASSAY OF FERRITIN: CPT | Performed by: PEDIATRICS

## 2020-08-03 PROCEDURE — 85610 PROTHROMBIN TIME: CPT | Performed by: PEDIATRICS

## 2020-08-03 PROCEDURE — 25000128 H RX IP 250 OP 636: Performed by: PEDIATRICS

## 2020-08-03 PROCEDURE — 80053 COMPREHEN METABOLIC PANEL: CPT | Performed by: STUDENT IN AN ORGANIZED HEALTH CARE EDUCATION/TRAINING PROGRAM

## 2020-08-03 PROCEDURE — 84100 ASSAY OF PHOSPHORUS: CPT | Performed by: PEDIATRICS

## 2020-08-03 PROCEDURE — 84134 ASSAY OF PREALBUMIN: CPT | Performed by: STUDENT IN AN ORGANIZED HEALTH CARE EDUCATION/TRAINING PROGRAM

## 2020-08-03 PROCEDURE — 84550 ASSAY OF BLOOD/URIC ACID: CPT | Performed by: PEDIATRICS

## 2020-08-03 RX ADMIN — PANTOPRAZOLE SODIUM 40 MG: 20 TABLET, DELAYED RELEASE ORAL at 08:38

## 2020-08-03 RX ADMIN — CEFEPIME 2000 MG: 2 INJECTION, POWDER, FOR SOLUTION INTRAVENOUS at 23:43

## 2020-08-03 RX ADMIN — CEFEPIME 2000 MG: 2 INJECTION, POWDER, FOR SOLUTION INTRAVENOUS at 00:25

## 2020-08-03 RX ADMIN — VALACYCLOVIR HYDROCHLORIDE 1000 MG: 500 TABLET, FILM COATED ORAL at 08:38

## 2020-08-03 RX ADMIN — Medication 150 MG: at 14:13

## 2020-08-03 RX ADMIN — VALACYCLOVIR HYDROCHLORIDE 1000 MG: 500 TABLET, FILM COATED ORAL at 19:45

## 2020-08-03 RX ADMIN — I.V. FAT EMULSION 250 ML: 20 EMULSION INTRAVENOUS at 19:46

## 2020-08-03 RX ADMIN — CEFEPIME 2000 MG: 2 INJECTION, POWDER, FOR SOLUTION INTRAVENOUS at 08:36

## 2020-08-03 RX ADMIN — VALACYCLOVIR HYDROCHLORIDE 1000 MG: 500 TABLET, FILM COATED ORAL at 14:13

## 2020-08-03 RX ADMIN — Medication 150 MG: at 08:38

## 2020-08-03 RX ADMIN — PHYTONADIONE: 1 INJECTION, EMULSION INTRAMUSCULAR; INTRAVENOUS; SUBCUTANEOUS at 19:46

## 2020-08-03 RX ADMIN — CEFEPIME 2000 MG: 2 INJECTION, POWDER, FOR SOLUTION INTRAVENOUS at 15:29

## 2020-08-03 RX ADMIN — MICAFUNGIN SODIUM 50 MG: 10 INJECTION, POWDER, LYOPHILIZED, FOR SOLUTION INTRAVENOUS at 19:51

## 2020-08-03 RX ADMIN — Medication 150 MG: at 19:45

## 2020-08-03 NOTE — PROGRESS NOTES
Pediatric BMT Daily Progress Note    Interval Events:  Afebrile. Hemodynamically stable. Clinically well appearing, eating and drinking small amounts.    Review of Systems: Pertinent positives include those mentioned in interval events. A complete review of systems was performed and is otherwise negative.      Medications:  Please see MAR    Physical Exam:  Temp:  [98.7  F (37.1  C)-99.2  F (37.3  C)] 99  F (37.2  C)  Pulse:  [75-87] 79  Heart Rate:  [79] 79  Resp:  [16-20] 20  BP: ()/(55-63) 105/60  Cuff Mean (mmHg):  [64] 64  SpO2:  [98 %-100 %] 99 %  I/O last 3 completed shifts:  In: 2128.6 [I.V.:520]  Out: 775 [Urine:775]  GEN: Lying in bed, smiling and talkative, well appearing. Mother present.  HEENT: Full head of hair, normocephalic, sclerae clear,  nares patent, OP with mild erythema and no visible lesions.  CARD: RRR, normal S1/S2 without murmur.   RESP: Lungs CTA bilaterally without adventitious lung sounds  ABD: Soft NT, ND, no organomegaly.  EXTREM: warm and well perfused.   SKIN: Pale throughout, clear without erythema, rash or lesions.  ACCESS: CVC and port    Labs:  Labs reviewed, pertinent findings WBC 0.2, Hb 9.4, platelets 15K. 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 CD19 positive (dim).      Isael completed lymphodepleting chemotherapy prep as an outpatient. He was admitted for fever on 7/24, blood cultures no growth to date.  He is now day +7 s/p Kymriah infusion. Afebrile, stable and doing well.     ALL and BMT: High risk B cell ALL (CNS negative) + JAK2 activation.  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  - Completed  apheresis collection in the [facility] (6/2020).   - He has been receiving bridging chemotherapy with oral 6MP, methotrexate and ruxolitinib. All oral chemotherapy stopped on 7/12.   - BM revealed 75% involvement with leukemia. CSF was negative.   - Outpatient lymphodepleting chemotherapy per [id number] with cytoxan and fludarabine 7/21-7/24 completed as an outpatient    Kymriah infusion 7/27   - Pre-meds of Tylenol and Benadryl   - Flush completed.  - No steroid post kymriah unless needed for severe blood product transfusion reaction.      BMT Daily CARTOX Note (complete days 0-14 and with any subsequent CRS/neurotoxicity)    Date: August 3, 2020    Artemio Nino ([id number]) is a 20 year old year old male who received infusion on 7/27/20, currently day +6 of CAR-T cell therapy Kymriah    Vital Signs: /60   Pulse 79   Temp 99  F (37.2  C) (Oral)   Resp 20   Wt 59.8 kg (131 lb 13.4 oz)   SpO2 99%   BMI 19.42 kg/m      Organ CAR-T toxicity:    Cardiac: No   Pulmonary: No   Renal: No   Liver: No   Coagulopathy: No   Neurologic: No   Immune: No    Overall CRS grade Grade 1    CRS Parameter Grade 1 Grade 2 Grade 3 Grade 4   Fever* Tm >= 100.4 degrees F Tm >= 100.4 degrees F Tm >= 100.4 degrees F Tm >= to 100.4 degrees F       With Either:  Hypotension None Responsive to Fluids Requiring 1 vasopressor (w/ or w/o vasopressin) Requiring multiple vasopressors (excluding vasopressin)     And/or  Hypoxia None Low-flow nasal cannula or blow-by High-flow nasal cannula, face mask, non-rebreather mask, or Venturi mask Requiring positive pressure (CPAP, BiPAP intubation and mechanical ventilation)       * Definition of High Dose Pressors for Grade 4  Vasopressor Duration >= 3 hours   Norepinephrine monotherapy >= 20 mcg/kg/minute   Dopamine monotherapy >= 10 mcg/kg/minute   Phenylephrine monotherapy >= 200 mcg/minute   Epinephrine monotherapy >= 10 mcg/minute   If on vasopressin + another agent High dose of  vasopressin + norepi equivalent (using VASST formula below) is >= 10 mcg/minute   If on combination vasopressors, not including vasopressin Norepi equivalent of > 20 mcg/minute (using VASST formula below)     VASST formula: Norepi equivalent dose = [NE (mcg/min)] + [dopamine (mcg/min) / 2] + [epinephrine (mcg/min)] + [phenylephrine (mcg/min) / 10]    ICE Assessment  Orientation to year, month, city, hospital: 4 points  Name 3 objects (e.g., point to clock, pen, button): 3 points  Following commands: (e.g., Show me 2 fingers): 1 point   Write a standard sentence (e.g., The pickett jumped over the log): 1 point   Count backwards from 100 by ten: 1 point  Total points: 10: No impairment    CRS Summary  Does patient have CRS? Yes, date of onset: 7/28/20  Current CRS stgstrstastdstest:st st1st What is the maximum severity to date: Grade 2 (fever and hypotension)  What therapy was given: No  Has CRS grade changed? No  Has CRS resolved? Yes, date of resolution: 8/1/20    Neurotoxicity Summary  Does patient have neurotoxicity? No  Current Neurotoxicity score (0-10): 0  What is the maximum severity to date:  0  What therapy was given: No  Has neurotoxicity resolved? N/A    FEN/Renal:   # Risk for malnutrition: Appetite remains decreased,  starting to eat small amounts and is drinking now also.  - Continue nutritional support with TPN/IL x 24h  - Age appropriate diet   - Monitor nutritional intake     # Risk for electrolyte abnormalities:  - Check daily electrolytes     # Risk for renal dysfunction/risk for ELIZ secondary to potential tumor lysis with preparatory chemotherapy: GFR 133ml/min  - Daily electrolyte monitoring  - Monitor daily weights     # Risk for TLS: Uric Acid 1.1 today.  - Continue Allopurinol TID  - Daily TLS labs     Pulmonary:  # Risk for pulmonary insufficiency: No current concerns. Normal chest and sinus CTs during work-up.      Cardiovascular:  # Aaron-Parkinson-White Syndrome: diagnosed upon syncope in 02/2018.  Asymptomatic, no interventions to date. No current concerns.  - EKG 7/15 revealed WPW with normal sinus rhythm.   - Echocardiogram 7/15 showed LVEF of 62%  - Cardiology to decide if they want to assess him.     Heme:   # Pancytopenia secondary to chemotherapy:  - Transfuse for hemoglobin < 7, platelets < 10,000   -  Mild hives noted at end of pRBC transfusion on 7/25. Premedicate with Tylenol and Benadryl for pRBC transfusions.   - Recent history of significant hives with platelet transfusions. Was being premedicated with hydrocortisone, tylenol, and benadryl for platelets. However, no hydrocortisone premedication ordered now given CAR-T. If severe reaction then ok to give steroids per Dr. Zavala.   - No GCSF per protocol     Infectious Disease:  # Recent Febrile Neutropenia: Admitted for fever. Afebrile x 48h. S/p vancomycin.  Continue Cefepime and follow blood cultures closely  - Covid negative 7/17 and 7/24     # Risk for infection given immunocompromised status:    Active: See above  Prophylaxis:                                                                      - Viral prophylaxis: HD Valtrex continue through Day +30  - Fungal prophylaxis: Micafungin continue through Day +30. History of photoxic drug eruption with voriconazole, avoid use.   - Bacterial prophylaxis: None  - PCP prophylaxis: Bactrim to begin day +28      GI:   # Nausea:  - Scheduled medications: Kytril BID - discontinued per patient request 7/26  - PRN medications: Benadryl PRN     # Acid reflux:  Protonix once daily. Continue TUMS prn.     # Recent Transaminitis:  Possibly related to recent chemotherapy. May warrant RUQ US to further assess persistent transamnitis.       Neuro:  # Mucositis/pain: Throat pain intermittent,  improving.   - Oxycodone PRN available, has not used  - Tramadol prn  - Covid negative 7/17 and 7/24     Oral Hygiene:   # Receding gums, risk for infection  - Continue chlorhexidine mouth wash for symptomatic control,  pain now improved     Derm:  No current rash. He is no longer using creams.     Fertility:  Sperm collected pre chemotherapy for fertility preservation      Discharge Considerations: Expected lengths of hospitalization for patients with complications from stem cell transplant vary based on the complication(s) and severity(ies). A typical stay is 7-14 days.     The above plan of care was developed by and communicated to me by the Pediatric BMT attending physician, MD Lisa Tai CPNP  [facility]  Pediatric Blood and Marrow Transplant    Pediatric BMT Inpatient Attending Note:    Artemio was seen and evaluated by me today.       The significant interval history includes: afebrile with stable vitals, no concerns with hemodynamic instability, on room air, no transfusions today. Blood cultures continue to be negative. We will continue to monitor him closely for a clinical change for concerns of tumor lysis and cytokine release syndrome as he is in the high risk period. His mouth/throat pain is stable. He continues to take little orally. He remains neutropenic.  We will maintain his antibiotics and TPN.      I have reviewed the vital signs, medications and lab results.  I assisted in formulating a plan, which was instituted by the BMT team. The total amount of time spent in the admission and care of Artemio Nino today was >35 minutes, at least 50% of which was counseling and coordination of care.     Sid Girard MD    Pediatric Blood and Marrow Transplant   [facility]  Pager: [phone]         Patient Active Problem List   Diagnosis     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)     Aaron-Parkinson-White (WPW) syndrome     Bone marrow transplant candidate     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)     At risk for infection     S/P allogeneic bone marrow transplant (H)     Acute lymphoblastic leukemia (ALL)  in relapse (H)     Fever     Neutropenic fever (H)

## 2020-08-03 NOTE — PLAN OF CARE
[[id number]] Afebrile. LSC on RA, diminished in the bases. HR 80s. Low BP 88/56-58 when sleeping at night, laying supine. Taking oral meds. Denies pain and n/v. Took a shower this evening when he was hep. locked for an hour. No output recorded since the hats were taken out of the toilet. Slept throughout the night shift. No family at bedside. Hourly rounding complete. Continue with POC.

## 2020-08-03 NOTE — PROGRESS NOTES
Pediatric BMT Daily Progress Note    Interval Events:  No fevers in the last 24 hours. No airway concerns and hemodynamically stable.  Eating small amounts. No nausea or vomiting.     Review of Systems: Pertinent positives include those mentioned in interval events. A complete review of systems was performed and is otherwise negative.      Medications:  Please see MAR    Physical Exam:  Temp:  [98.7  F (37.1  C)-99.2  F (37.3  C)] 99.1  F (37.3  C)  Pulse:  [77-87] 82  Heart Rate:  [79] 79  Resp:  [16-18] 16  BP: ()/(55-63) 88/55  Cuff Mean (mmHg):  [64] 64  SpO2:  [98 %-100 %] 98 %  I/O last 3 completed shifts:  In: 2128.6 [I.V.:520]  Out: 775 [Urine:775]  GEN: Lying in bed, no acute distress, pale.   HEENT: Full head of hair, normocephalic, sclerae clear,  nares patent, OP with mild erythema and no visible lesions.  CARD: RRR, normal S1/S2 without murmur.   RESP: Lungs CTA bilaterally without adventitious lung sounds  ABD: Soft NT, ND, no organomegaly.  EXTREM: warm and well perfused.   SKIN: Pale throughout, clear without erythema, rash or lesions.  ACCESS: CVC and port    Labs:  Labs reviewed, pertinent findings WBC 0.2, Hb 9.4, platelets 19K. 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 CD19 positive (dim).      Isael completed lymphodepleting chemotherapy prep as an outpatient. He was admitted for fever on 7/24, blood cultures no growth to date.  He is now day +6 s/p Kymriah infusion.      ALL and BMT: High risk B cell ALL (CNS negative) + JAK2 activation.  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  - Completed apheresis collection in [facility] (6/2020).   - He has been receiving bridging chemotherapy with oral 6MP, methotrexate and ruxolitinib. All oral chemotherapy stopped on 7/12.   - BM revealed 75% involvement with leukemia. CSF was negative.   - Outpatient lymphodepleting chemotherapy per [id number] with cytoxan and fludarabine 7/21-7/24 completed as an outpatient    Kymriah infusion 7/27   - Pre-meds of Tylenol and Benadryl   - D5% 1/2NS 2 hours prior to Kymriah infusion continue 30 minute during infusion and 6 hours post   - No steroid post kymriah unless needed for severe blood product transfusion reaction.      BMT Daily CARTOX Note (complete days 0-14 and with any subsequent CRS/neurotoxicity)    Date: August 2, 2020    Artemio Nino ([id number]) is a 20 year old year old male who received infusion on 7/27/20, currently day +6 of CAR-T cell therapy Kymriah    Vital Signs: BP (!) 88/55   Pulse 82   Temp 99.1  F (37.3  C) (Oral)   Resp 16   Wt 58.9 kg (129 lb 13.6 oz)   SpO2 98%   BMI 19.12 kg/m      Organ CAR-T toxicity:    Cardiac: No   Pulmonary: No   Renal: No   Liver: No   Coagulopathy: No   Neurologic: No   Immune: No    Overall CRS grade Grade 1    CRS Parameter Grade 1 Grade 2 Grade 3 Grade 4   Fever* Tm >= 100.4 degrees F Tm >= 100.4 degrees F Tm >= 100.4 degrees F Tm >= to 100.4 degrees F       With Either:  Hypotension None Responsive to Fluids Requiring 1 vasopressor (w/ or w/o vasopressin) Requiring multiple vasopressors (excluding vasopressin)     And/or  Hypoxia None Low-flow nasal cannula or blow-by High-flow nasal cannula, face mask, non-rebreather mask, or Venturi mask Requiring positive pressure (CPAP, BiPAP intubation and mechanical ventilation)       * Definition of High Dose Pressors for Grade 4  Vasopressor Duration >= 3 hours   Norepinephrine monotherapy >= 20 mcg/kg/minute   Dopamine monotherapy >= 10 mcg/kg/minute   Phenylephrine monotherapy >= 200 mcg/minute   Epinephrine monotherapy >= 10 mcg/minute    If on vasopressin + another agent High dose of vasopressin + norepi equivalent (using VASST formula below) is >= 10 mcg/minute   If on combination vasopressors, not including vasopressin Norepi equivalent of > 20 mcg/minute (using VASST formula below)     VASST formula: Norepi equivalent dose = [NE (mcg/min)] + [dopamine (mcg/min) / 2] + [epinephrine (mcg/min)] + [phenylephrine (mcg/min) / 10]    ICE Assessment  Orientation to year, month, city, hospital: 4 points  Name 3 objects (e.g., point to clock, pen, button): 3 points  Following commands: (e.g., Show me 2 fingers): 1 point   Write a standard sentence (e.g., The pickett jumped over the log): 1 point   Count backwards from 100 by ten: 1 point  Total points: 10: No impairment    CRS Summary  Does patient have CRS? Yes, date of onset: 7/28/20  Current CRS stgstrstastdstest:st st1st What is the maximum severity to date: Grade 2 (fever and hypotension)  What therapy was given: No  Has CRS grade changed? No  Has CRS resolved? Yes, date of resolution: 8/1/20    Neurotoxicity Summary  Does patient have neurotoxicity? No  Current Neurotoxicity score (0-10): 0  What is the maximum severity to date:  0  What therapy was given: No  Has neurotoxicity resolved? N/A    FEN/Renal:   # Risk for malnutrition: Appetite remains decreased  - Continue nutritional support with TPN/IL x 24h  - Age appropriate diet   - Monitor nutritional intake     # Risk for electrolyte abnormalities:  - Check daily electrolytes     # Risk for renal dysfunction/risk for ELIZ secondary to potential tumor lysis with preparatory chemotherapy: GFR 133ml/min  - Daily electrolyte monitoring  - Monitor daily weights     # Risk for TLS: Uric Acid 1.0 today.  - Continue Allopurinol TID  - Daily TLS labs     Pulmonary:  # Risk for pulmonary insufficiency: No current concerns. Normal chest and sinus CTs during work-up.      Cardiovascular:  # Aaron-Parkinson-White Syndrome: diagnosed upon syncope in 02/2018. Asymptomatic, no  interventions to date. No current concerns.  - EKG 7/15 revealed WPW with normal sinus rhythm.   - Echocardiogram 7/15 showed LVEF of 62%  - Cardiology to decide if they want to assess him.     Heme:   # Pancytopenia secondary to chemotherapy:  - Transfuse for hemoglobin < 7, platelets < 10,000   -  Mild hives noted at end of pRBC transfusion on 7/25. Premedicate with Tylenol and Benadryl for pRBC transfusions.   - Recent history of significant hives with platelet transfusions. Was being premedicated with hydrocortisone, tylenol, and benadryl for platelets. However, no hydrocortisone premedication ordered now given CAR-T. If severe reaction then ok to give steroids per Dr. Zavala.   - No GCSF per protocol     Infectious Disease:  # Recent Febrile Neutropenia: Admitted for fever. Afebrile x 48h. S/p vancomycin.  Continue Cefepime and follow blood cultures closely  - Covid negative 7/17 and 7/24     # Risk for infection given immunocompromised status:    Active: See above  Prophylaxis:                                                                      - Viral prophylaxis: HD Valtrex continue through Day +30  - Fungal prophylaxis: Micafungin continue through Day +30. History of photoxic drug eruption with voriconazole, avoid use.   - Bacterial prophylaxis: None  - PCP prophylaxis: Bactrim to begin day +28      GI:   # Nausea:  - Scheduled medications: Kytril BID - discontinued per patient request 7/26  - PRN medications: Benadryl PRN     # Acid reflux:  Protonix once daily. Continue TUMS prn.     # Recent Transaminitis:  Possibly related to recent chemotherapy. May warrant RUQ US to further assess persistent transamnitis.       Neuro:  # Mucositis/pain: Throat pain intermittent he says and is improving.   - Oxycodone PRN available  - Tramadol prn  - Covid negative 7/17 and 7/24     Oral Hygiene:   # Receding gums, risk for infection  - Continue chlorhexidine mouth wash for symptomatic control, pain now  improved     Derm:  No current rash. He is no longer using creams.     Fertility:  Sperm collected pre chemotherapy for fertility preservation    Discharge Considerations: Expected lengths of hospitalization for patients with complications from stem cell transplant vary based on the complication(s) and severity(ies). A typical stay is 7-14 days.     The above plan of care was developed by and communicated to me by the Pediatric BMT attending physician, MD Lisa Tai CPNP  [facility]  Pediatric Blood and Marrow Transplant      Pediatric BMT Inpatient Attending Note:    Artemio was seen and evaluated by me today.       The significant interval history includes: afebrile now, vitals stable, no concerns with hemodynamic instability, continue to moniitor, on room air, no transfusions today. Blood cultures continue to be negative. We continue to monitor him closely for a clinical change for concerns of tumor lysis and cytokine release syndrome as he is in the high risk period. His mouth/throat pain is stable. He continues to take little orally. He remains neutropenic.  We will maintain his antibiotics and TPN.      I have reviewed the vital signs, medications and lab results.  I assisted in formulating a plan, which was instituted by the BMT team. The total amount of time spent in the admission and care of Artemio Nino today was >40 minutes, at least 50% of which was counseling and coordination of care.     Sid Girard MD    Pediatric Blood and Marrow Transplant   [facility]  Pager: [phone]         Patient Active Problem List   Diagnosis     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)     Aaron-Parkinson-White (WPW) syndrome     Bone marrow transplant candidate     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)     At risk for infection     S/P allogeneic bone marrow transplant (H)     Acute lymphoblastic  leukemia (ALL) in relapse (H)     Fever     Neutropenic fever (H)

## 2020-08-03 NOTE — PLAN OF CARE
VSS, lungs clear. T-max 99. Denies nausea or pain. Eager to go home. Appetite is fair, drinking well. Will continue to monitor and report.

## 2020-08-04 LAB
ALBUMIN SERPL-MCNC: 3.1 G/DL (ref 3.4–5)
ALP SERPL-CCNC: 59 U/L (ref 40–150)
ALT SERPL W P-5'-P-CCNC: 51 U/L (ref 0–70)
ANION GAP SERPL CALCULATED.3IONS-SCNC: 6 MMOL/L (ref 3–14)
AST SERPL W P-5'-P-CCNC: 16 U/L (ref 0–45)
BACTERIA SPEC CULT: NO GROWTH
BACTERIA SPEC CULT: NO GROWTH
BILIRUB SERPL-MCNC: 0.6 MG/DL (ref 0.2–1.3)
BUN SERPL-MCNC: 21 MG/DL (ref 7–30)
CA-I BLD-MCNC: 4.8 MG/DL (ref 4.4–5.2)
CALCIUM SERPL-MCNC: 8.3 MG/DL (ref 8.5–10.1)
CHLORIDE SERPL-SCNC: 108 MMOL/L (ref 94–109)
CO2 SERPL-SCNC: 26 MMOL/L (ref 20–32)
CREAT SERPL-MCNC: 0.6 MG/DL (ref 0.66–1.25)
DIFFERENTIAL METHOD BLD: ABNORMAL
ERYTHROCYTE [DISTWIDTH] IN BLOOD BY AUTOMATED COUNT: 12.9 % (ref 10–15)
GFR SERPL CREATININE-BSD FRML MDRD: >90 ML/MIN/{1.73_M2}
GLUCOSE SERPL-MCNC: 108 MG/DL (ref 70–99)
HCT VFR BLD AUTO: 27.5 % (ref 40–53)
HGB BLD-MCNC: 9.6 G/DL (ref 13.3–17.7)
Lab: NORMAL
Lab: NORMAL
MCH RBC QN AUTO: 28.7 PG (ref 26.5–33)
MCHC RBC AUTO-ENTMCNC: 34.9 G/DL (ref 31.5–36.5)
MCV RBC AUTO: 82 FL (ref 78–100)
PHOSPHATE SERPL-MCNC: 4.1 MG/DL (ref 2.5–4.5)
PLATELET # BLD AUTO: 12 10E9/L (ref 150–450)
POTASSIUM SERPL-SCNC: 3.6 MMOL/L (ref 3.4–5.3)
PROT SERPL-MCNC: 7.3 G/DL (ref 6.8–8.8)
RBC # BLD AUTO: 3.35 10E12/L (ref 4.4–5.9)
SODIUM SERPL-SCNC: 140 MMOL/L (ref 133–144)
SPECIMEN SOURCE: NORMAL
SPECIMEN SOURCE: NORMAL
URATE SERPL-MCNC: 1 MG/DL (ref 3.5–7.2)
WBC # BLD AUTO: 0.2 10E9/L (ref 4–11)

## 2020-08-04 PROCEDURE — 84550 ASSAY OF BLOOD/URIC ACID: CPT | Performed by: STUDENT IN AN ORGANIZED HEALTH CARE EDUCATION/TRAINING PROGRAM

## 2020-08-04 PROCEDURE — 85027 COMPLETE CBC AUTOMATED: CPT

## 2020-08-04 PROCEDURE — 20600000 ZZH R&B BMT

## 2020-08-04 PROCEDURE — 25000128 H RX IP 250 OP 636: Performed by: STUDENT IN AN ORGANIZED HEALTH CARE EDUCATION/TRAINING PROGRAM

## 2020-08-04 PROCEDURE — 25000128 H RX IP 250 OP 636: Performed by: PEDIATRICS

## 2020-08-04 PROCEDURE — 25000132 ZZH RX MED GY IP 250 OP 250 PS 637: Performed by: STUDENT IN AN ORGANIZED HEALTH CARE EDUCATION/TRAINING PROGRAM

## 2020-08-04 PROCEDURE — 82330 ASSAY OF CALCIUM: CPT | Performed by: NURSE PRACTITIONER

## 2020-08-04 PROCEDURE — 25000125 ZZHC RX 250: Performed by: PEDIATRICS

## 2020-08-04 PROCEDURE — 84100 ASSAY OF PHOSPHORUS: CPT | Performed by: STUDENT IN AN ORGANIZED HEALTH CARE EDUCATION/TRAINING PROGRAM

## 2020-08-04 PROCEDURE — 80053 COMPREHEN METABOLIC PANEL: CPT | Performed by: STUDENT IN AN ORGANIZED HEALTH CARE EDUCATION/TRAINING PROGRAM

## 2020-08-04 PROCEDURE — 25800030 ZZH RX IP 258 OP 636: Performed by: PEDIATRICS

## 2020-08-04 RX ADMIN — Medication 150 MG: at 20:09

## 2020-08-04 RX ADMIN — MICAFUNGIN SODIUM 50 MG: 10 INJECTION, POWDER, LYOPHILIZED, FOR SOLUTION INTRAVENOUS at 20:10

## 2020-08-04 RX ADMIN — CEFEPIME 2000 MG: 2 INJECTION, POWDER, FOR SOLUTION INTRAVENOUS at 23:52

## 2020-08-04 RX ADMIN — VALACYCLOVIR HYDROCHLORIDE 1000 MG: 500 TABLET, FILM COATED ORAL at 14:07

## 2020-08-04 RX ADMIN — Medication 150 MG: at 14:07

## 2020-08-04 RX ADMIN — Medication 150 MG: at 07:44

## 2020-08-04 RX ADMIN — CEFEPIME 2000 MG: 2 INJECTION, POWDER, FOR SOLUTION INTRAVENOUS at 15:46

## 2020-08-04 RX ADMIN — VALACYCLOVIR HYDROCHLORIDE 1000 MG: 500 TABLET, FILM COATED ORAL at 20:10

## 2020-08-04 RX ADMIN — I.V. FAT EMULSION 250 ML: 20 EMULSION INTRAVENOUS at 20:10

## 2020-08-04 RX ADMIN — PANTOPRAZOLE SODIUM 40 MG: 20 TABLET, DELAYED RELEASE ORAL at 07:44

## 2020-08-04 RX ADMIN — CEFEPIME 2000 MG: 2 INJECTION, POWDER, FOR SOLUTION INTRAVENOUS at 07:43

## 2020-08-04 RX ADMIN — VALACYCLOVIR HYDROCHLORIDE 1000 MG: 500 TABLET, FILM COATED ORAL at 07:44

## 2020-08-04 RX ADMIN — PHYTONADIONE: 1 INJECTION, EMULSION INTRAMUSCULAR; INTRAVENOUS; SUBCUTANEOUS at 20:10

## 2020-08-04 NOTE — PROGRESS NOTES
Pediatric BMT Daily Progress Note    Interval Events:  Afebrile. Clinically well. Mild rash on his nasal bridge. Hemodynamically stable.     Review of Systems: Pertinent positives include those mentioned in interval events. A complete review of systems was performed and is otherwise negative.      Medications:  Please see MAR    Physical Exam:  Temp:  [98  F (36.7  C)-99.5  F (37.5  C)] 99.1  F (37.3  C)  Pulse:  [79-90] 80  Resp:  [16-22] 16  BP: ()/(54-66) 90/60  SpO2:  [97 %-99 %] 98 %  I/O last 3 completed shifts:  In: 3070.4 [P.O.:630; I.V.:505]  Out: 1880 [Urine:1880]  GEN: Lying in bed, Pleasant and cooperative. NAD. Mother present.   HEENT: Full head of hair, normocephalic, sclerae clear,  nares patent, OP with mild erythema and no visible lesions.  CARD: RRR, normal S1/S2 without murmur. Cap refill < 2 sec.   RESP: Lungs CTA bilaterally without adventitious lung sounds  ABD: Soft NT, ND, no organomegaly.  EXTREM: warm and well perfused.   SKIN: Pale throughout, mild erythematous rash on nasal bridge, otherwise clear  ACCESS: CVC and port    Labs:  Labs reviewed, pertinent findings WBC 0.2, Hb 9.6, platelets 12K. 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 CD19 positive (dim).      Isael completed lymphodepleting chemotherapy prep as an outpatient. He was admitted for fever on 7/24, blood cultures no growth to date.  He is now day +8 s/p Kymriah infusion. Afebrile, stable and doing well.     ALL and BMT: High risk B cell ALL (CNS negative) + JAK2 activation.  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  - Completed apheresis collection in the  [facility] (6/2020).   - He has been receiving bridging chemotherapy with oral 6MP, methotrexate and ruxolitinib. All oral chemotherapy stopped on 7/12.   - BM revealed 75% involvement with leukemia. CSF was negative.   - Outpatient lymphodepleting chemotherapy per [id number] with cytoxan and fludarabine 7/21-7/24 completed as an outpatient  - Tentative discharge date Thursday 8/6.     Kymriah infusion 7/27   - Pre-meds of Tylenol and Benadryl   - Flush completed.  - No steroid post kymriah unless needed for severe blood product transfusion reaction.      BMT Daily CARTOX Note (complete days 0-14 and with any subsequent CRS/neurotoxicity)    Date: August 3, 2020    Artemio Nino ([id number]) is a 20 year old year old male who received infusion on 7/27/20, currently day +8 of CAR-T cell therapy Kymriah    Vital Signs: BP 90/60   Pulse 80   Temp 99.1  F (37.3  C) (Oral)   Resp 16   Wt 59.8 kg (131 lb 13.4 oz)   SpO2 98%   BMI 19.42 kg/m      Organ CAR-T toxicity:    Cardiac: No   Pulmonary: No   Renal: No   Liver: No   Coagulopathy: No   Neurologic: No   Immune: No    Overall CRS grade Grade 1    CRS Parameter Grade 1 Grade 2 Grade 3 Grade 4   Fever* Tm >= 100.4 degrees F Tm >= 100.4 degrees F Tm >= 100.4 degrees F Tm >= to 100.4 degrees F       With Either:  Hypotension None Responsive to Fluids Requiring 1 vasopressor (w/ or w/o vasopressin) Requiring multiple vasopressors (excluding vasopressin)     And/or  Hypoxia None Low-flow nasal cannula or blow-by High-flow nasal cannula, face mask, non-rebreather mask, or Venturi mask Requiring positive pressure (CPAP, BiPAP intubation and mechanical ventilation)       * Definition of High Dose Pressors for Grade 4  Vasopressor Duration >= 3 hours   Norepinephrine monotherapy >= 20 mcg/kg/minute   Dopamine monotherapy >= 10 mcg/kg/minute   Phenylephrine monotherapy >= 200 mcg/minute   Epinephrine monotherapy >= 10 mcg/minute   If on vasopressin + another agent  High dose of vasopressin + norepi equivalent (using VASST formula below) is >= 10 mcg/minute   If on combination vasopressors, not including vasopressin Norepi equivalent of > 20 mcg/minute (using VASST formula below)     VASST formula: Norepi equivalent dose = [NE (mcg/min)] + [dopamine (mcg/min) / 2] + [epinephrine (mcg/min)] + [phenylephrine (mcg/min) / 10]    ICE Assessment  Orientation to year, month, city, hospital: 4 points  Name 3 objects (e.g., point to clock, pen, button): 3 points  Following commands: (e.g., Show me 2 fingers): 1 point   Write a standard sentence (e.g., The pickett jumped over the log): 1 point   Count backwards from 100 by ten: 1 point  Total points: 10: No impairment    CRS Summary  Does patient have CRS? Yes, date of onset: 7/28/20  Current CRS stgstrstastdstest:st st1st What is the maximum severity to date: Grade 2 (fever and hypotension)  What therapy was given: Antibiotics IV  Has CRS grade changed? No  Has CRS resolved? Yes, date of resolution: 8/1/20    Neurotoxicity Summary  Does patient have neurotoxicity? No  Current Neurotoxicity score (0-10): 0  What is the maximum severity to date:  0  What therapy was given: No  Has neurotoxicity resolved? N/A    FEN/Renal:   # Risk for malnutrition: Appetite remains decreased,  starting to eat small amounts and is drinking now also.  - Continue nutritional support with TPN/IL  - Age appropriate diet   - Monitor nutritional intake     # Risk for electrolyte abnormalities:  - Check daily electrolytes     # Risk for renal dysfunction/risk for ELIZ secondary to potential tumor lysis with preparatory chemotherapy: GFR 133ml/min  - Daily electrolyte monitoring  - Monitor daily weights     # Risk for TLS: Uric Acid 1.0 today.  - Continue Allopurinol TID  - Daily TLS labs     Pulmonary:  # Risk for pulmonary insufficiency: No current concerns. Normal chest and sinus CTs during work-up.      Cardiovascular:  # Aaron-Parkinson-White Syndrome: diagnosed upon syncope in  02/2018. Asymptomatic, no interventions to date. No current concerns.  - EKG 7/15 revealed WPW with normal sinus rhythm.   - Echocardiogram 7/15 showed LVEF of 62%  - Cardiology to decide if they want to assess him.     Heme:   # Pancytopenia secondary to chemotherapy:  - Transfuse for hemoglobin < 7, platelets < 10,000   -  Mild hives noted at end of pRBC transfusion on 7/25. Premedicate with Tylenol and Benadryl for pRBC transfusions.   - Recent history of significant hives with platelet transfusions. Was being premedicated with hydrocortisone, tylenol, and benadryl for platelets. However, no hydrocortisone premedication ordered now given CAR-T. If severe reaction then ok to give steroids per Dr. Zavala.   - No GCSF per protocol     Infectious Disease:  # Recent Febrile Neutropenia: Admitted for fever. Remains afebrile. S/p vancomycin.  Continue Cefepime and follow blood cultures closely  - Covid negative 7/17 and 7/24     # Risk for infection given immunocompromised status:    Active: See above  Prophylaxis:                                                                      - Viral prophylaxis: HD Valtrex continue through Day +30  - Fungal prophylaxis: Micafungin continue through Day +30. History of photoxic drug eruption with voriconazole, avoid use.   - Bacterial prophylaxis: None  - PCP prophylaxis: Bactrim to begin day +28      GI:   # Nausea:  - Scheduled medications: Kytril BID - discontinued per patient request 7/26  - PRN medications: Benadryl PRN     # Acid reflux:  Protonix once daily. Continue TUMS prn.     # Recent Transaminitis:  Possibly related to recent chemotherapy. May warrant RUQ US to further assess persistent transamnitis.       Neuro:  # Mucositis/pain: Throat pain intermittent,  improving.   - Oxycodone PRN available, has not used  - Tramadol prn  - Covid negative 7/17 and 7/24     Oral Hygiene:   # Receding gums, risk for infection  - Continue chlorhexidine mouth wash for symptomatic  control, pain now improved     Derm:  No current rash. He is no longer using creams.     Fertility:  Sperm collected pre chemotherapy for fertility preservation      Discharge Considerations: Expected lengths of hospitalization for patients with complications from stem cell transplant vary based on the complication(s) and severity(ies). A typical stay is 7-14 days.     The above plan of care was developed by and communicated to me by the Pediatric BMT attending physician, MD Candido Tai PA-C  Pediatric Blood and Marrow Transplant Program  [facility]      Pediatric BMT Inpatient Attending Note:    Artemio was seen and evaluated by me today.       The significant interval history includes: continues to do well, afebrile with stable vitals, no concerns with hemodynamic instability, no transfusions today. Blood cultures continue to be negative. We will continue to monitor him closely for a clinical change for concerns of tumor lysis and cytokine release syndrome as he is in the high risk period. His mouth/throat pain is stable. He continues to take little orally. He remains neutropenic.  We will maintain his antibiotics and TPN.      I have reviewed the vital signs, medications and lab results.  I assisted in formulating a plan, which was instituted by the BMT team. The total amount of time spent in the admission and care of Artemio Nino today was >35 minutes, at least 50% of which was counseling and coordination of care.     Sid Girard MD    Pediatric Blood and Marrow Transplant   [facility]  Pager: [phone]         Patient Active Problem List   Diagnosis     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)     Aaron-Parkinson-White (WPW) syndrome     Bone marrow transplant candidate     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)     At risk for infection     S/P allogeneic bone marrow transplant  (H)     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

## 2020-08-04 NOTE — PLAN OF CARE
Afebrile, VSS and within parameters. LS clear and sats well on RA. Denies pain. No nausea or emesis. Hourly rounding completed. Continue with plan of care.

## 2020-08-05 LAB
ALBUMIN SERPL-MCNC: 3 G/DL (ref 3.4–5)
ALP SERPL-CCNC: 59 U/L (ref 40–150)
ALT SERPL W P-5'-P-CCNC: 51 U/L (ref 0–70)
ANION GAP SERPL CALCULATED.3IONS-SCNC: 5 MMOL/L (ref 3–14)
AST SERPL W P-5'-P-CCNC: 18 U/L (ref 0–45)
BILIRUB SERPL-MCNC: 0.6 MG/DL (ref 0.2–1.3)
BUN SERPL-MCNC: 21 MG/DL (ref 7–30)
CA-I BLD-MCNC: 4.6 MG/DL (ref 4.4–5.2)
CALCIUM SERPL-MCNC: 8.5 MG/DL (ref 8.5–10.1)
CHLORIDE SERPL-SCNC: 109 MMOL/L (ref 94–109)
CO2 SERPL-SCNC: 25 MMOL/L (ref 20–32)
CREAT SERPL-MCNC: 0.64 MG/DL (ref 0.66–1.25)
DIFFERENTIAL METHOD BLD: ABNORMAL
ERYTHROCYTE [DISTWIDTH] IN BLOOD BY AUTOMATED COUNT: 12.8 % (ref 10–15)
GFR SERPL CREATININE-BSD FRML MDRD: >90 ML/MIN/{1.73_M2}
GLUCOSE SERPL-MCNC: 115 MG/DL (ref 70–99)
HCT VFR BLD AUTO: 25.9 % (ref 40–53)
HGB BLD-MCNC: 8.9 G/DL (ref 13.3–17.7)
MAGNESIUM SERPL-MCNC: 1.9 MG/DL (ref 1.6–2.3)
MCH RBC QN AUTO: 28.5 PG (ref 26.5–33)
MCHC RBC AUTO-ENTMCNC: 34.4 G/DL (ref 31.5–36.5)
MCV RBC AUTO: 83 FL (ref 78–100)
PHOSPHATE SERPL-MCNC: 3.8 MG/DL (ref 2.5–4.5)
PLATELET # BLD AUTO: 13 10E9/L (ref 150–450)
POTASSIUM SERPL-SCNC: 3.6 MMOL/L (ref 3.4–5.3)
PROT SERPL-MCNC: 7.4 G/DL (ref 6.8–8.8)
RBC # BLD AUTO: 3.12 10E12/L (ref 4.4–5.9)
SODIUM SERPL-SCNC: 139 MMOL/L (ref 133–144)
URATE SERPL-MCNC: 1.4 MG/DL (ref 3.5–7.2)
WBC # BLD AUTO: 0.1 10E9/L (ref 4–11)

## 2020-08-05 PROCEDURE — 25000125 ZZHC RX 250: Performed by: PEDIATRICS

## 2020-08-05 PROCEDURE — 25000128 H RX IP 250 OP 636: Performed by: PEDIATRICS

## 2020-08-05 PROCEDURE — 84550 ASSAY OF BLOOD/URIC ACID: CPT | Performed by: STUDENT IN AN ORGANIZED HEALTH CARE EDUCATION/TRAINING PROGRAM

## 2020-08-05 PROCEDURE — 86922 COMPATIBILITY TEST ANTIGLOB: CPT | Performed by: STUDENT IN AN ORGANIZED HEALTH CARE EDUCATION/TRAINING PROGRAM

## 2020-08-05 PROCEDURE — 80053 COMPREHEN METABOLIC PANEL: CPT | Performed by: STUDENT IN AN ORGANIZED HEALTH CARE EDUCATION/TRAINING PROGRAM

## 2020-08-05 PROCEDURE — 85027 COMPLETE CBC AUTOMATED: CPT

## 2020-08-05 PROCEDURE — 25000132 ZZH RX MED GY IP 250 OP 250 PS 637: Performed by: STUDENT IN AN ORGANIZED HEALTH CARE EDUCATION/TRAINING PROGRAM

## 2020-08-05 PROCEDURE — 25000132 ZZH RX MED GY IP 250 OP 250 PS 637: Performed by: PEDIATRICS

## 2020-08-05 PROCEDURE — 86850 RBC ANTIBODY SCREEN: CPT | Performed by: STUDENT IN AN ORGANIZED HEALTH CARE EDUCATION/TRAINING PROGRAM

## 2020-08-05 PROCEDURE — 86900 BLOOD TYPING SEROLOGIC ABO: CPT | Performed by: STUDENT IN AN ORGANIZED HEALTH CARE EDUCATION/TRAINING PROGRAM

## 2020-08-05 PROCEDURE — 20600000 ZZH R&B BMT

## 2020-08-05 PROCEDURE — 25000128 H RX IP 250 OP 636: Performed by: STUDENT IN AN ORGANIZED HEALTH CARE EDUCATION/TRAINING PROGRAM

## 2020-08-05 PROCEDURE — 87103 BLOOD FUNGUS CULTURE: CPT | Performed by: STUDENT IN AN ORGANIZED HEALTH CARE EDUCATION/TRAINING PROGRAM

## 2020-08-05 PROCEDURE — 82330 ASSAY OF CALCIUM: CPT | Performed by: NURSE PRACTITIONER

## 2020-08-05 PROCEDURE — 25800030 ZZH RX IP 258 OP 636: Performed by: PEDIATRICS

## 2020-08-05 PROCEDURE — 87040 BLOOD CULTURE FOR BACTERIA: CPT | Performed by: STUDENT IN AN ORGANIZED HEALTH CARE EDUCATION/TRAINING PROGRAM

## 2020-08-05 PROCEDURE — 86901 BLOOD TYPING SEROLOGIC RH(D): CPT | Performed by: STUDENT IN AN ORGANIZED HEALTH CARE EDUCATION/TRAINING PROGRAM

## 2020-08-05 PROCEDURE — 84100 ASSAY OF PHOSPHORUS: CPT | Performed by: STUDENT IN AN ORGANIZED HEALTH CARE EDUCATION/TRAINING PROGRAM

## 2020-08-05 PROCEDURE — 25800030 ZZH RX IP 258 OP 636: Performed by: PHYSICIAN ASSISTANT

## 2020-08-05 PROCEDURE — 83735 ASSAY OF MAGNESIUM: CPT | Performed by: STUDENT IN AN ORGANIZED HEALTH CARE EDUCATION/TRAINING PROGRAM

## 2020-08-05 RX ADMIN — Medication 150 MG: at 11:17

## 2020-08-05 RX ADMIN — SODIUM CHLORIDE: 900 INJECTION INTRAVENOUS at 14:45

## 2020-08-05 RX ADMIN — MICAFUNGIN SODIUM 50 MG: 10 INJECTION, POWDER, LYOPHILIZED, FOR SOLUTION INTRAVENOUS at 19:41

## 2020-08-05 RX ADMIN — ACETAMINOPHEN 650 MG: 325 TABLET, FILM COATED ORAL at 16:01

## 2020-08-05 RX ADMIN — VALACYCLOVIR HYDROCHLORIDE 1000 MG: 500 TABLET, FILM COATED ORAL at 11:16

## 2020-08-05 RX ADMIN — I.V. FAT EMULSION 250 ML: 20 EMULSION INTRAVENOUS at 19:41

## 2020-08-05 RX ADMIN — ACETAMINOPHEN 650 MG: 325 TABLET, FILM COATED ORAL at 05:03

## 2020-08-05 RX ADMIN — ACETAMINOPHEN 650 MG: 325 TABLET, FILM COATED ORAL at 21:31

## 2020-08-05 RX ADMIN — CALCIUM CARBONATE (ANTACID) CHEW TAB 500 MG 1000 MG: 500 CHEW TAB at 21:47

## 2020-08-05 RX ADMIN — CEFEPIME 2000 MG: 2 INJECTION, POWDER, FOR SOLUTION INTRAVENOUS at 08:41

## 2020-08-05 RX ADMIN — VALACYCLOVIR HYDROCHLORIDE 1000 MG: 500 TABLET, FILM COATED ORAL at 19:41

## 2020-08-05 RX ADMIN — CEFEPIME 2000 MG: 2 INJECTION, POWDER, FOR SOLUTION INTRAVENOUS at 16:01

## 2020-08-05 RX ADMIN — PHYTONADIONE: 1 INJECTION, EMULSION INTRAMUSCULAR; INTRAVENOUS; SUBCUTANEOUS at 19:41

## 2020-08-05 RX ADMIN — VALACYCLOVIR HYDROCHLORIDE 1000 MG: 500 TABLET, FILM COATED ORAL at 14:45

## 2020-08-05 RX ADMIN — PANTOPRAZOLE SODIUM 40 MG: 20 TABLET, DELAYED RELEASE ORAL at 11:16

## 2020-08-05 NOTE — PLAN OF CARE
Patient's temp max 102.8. Slightly tachycardic. Blood pressure stable. MD notified of vitals. Patient reported he has no appetite and did not eat today.  Continue to monitor patient closely.

## 2020-08-05 NOTE — PLAN OF CARE
Pt was febrile, tmax 101.2. MD notified, Cultures drawn, Tylenol given. Notified mother at 0025 via phone call of fever. HR increased with fever. BP WDL. Lung sounds clear, sats well on RA. Pain and n/v denied. Ate ice cream with mother in evening. Hourly rounding completed, continue POC.

## 2020-08-06 ENCOUNTER — TELEPHONE (OUTPATIENT)
Dept: TRANSPLANT | Facility: CLINIC | Age: 20
End: 2020-08-06

## 2020-08-06 LAB
ALBUMIN SERPL-MCNC: 3.1 G/DL (ref 3.4–5)
ALP SERPL-CCNC: 60 U/L (ref 40–150)
ALT SERPL W P-5'-P-CCNC: 59 U/L (ref 0–70)
ANION GAP SERPL CALCULATED.3IONS-SCNC: 5 MMOL/L (ref 3–14)
ANION GAP SERPL CALCULATED.3IONS-SCNC: 6 MMOL/L (ref 3–14)
AST SERPL W P-5'-P-CCNC: 22 U/L (ref 0–45)
BILIRUB SERPL-MCNC: 0.8 MG/DL (ref 0.2–1.3)
BLD PROD TYP BPU: NORMAL
BUN SERPL-MCNC: 17 MG/DL (ref 7–30)
BUN SERPL-MCNC: 20 MG/DL (ref 7–30)
CA-I BLD-MCNC: 4.5 MG/DL (ref 4.4–5.2)
CALCIUM SERPL-MCNC: 6.8 MG/DL (ref 8.5–10.1)
CALCIUM SERPL-MCNC: 8.4 MG/DL (ref 8.5–10.1)
CHLORIDE SERPL-SCNC: 106 MMOL/L (ref 94–109)
CHLORIDE SERPL-SCNC: 112 MMOL/L (ref 94–109)
CO2 SERPL-SCNC: 22 MMOL/L (ref 20–32)
CO2 SERPL-SCNC: 27 MMOL/L (ref 20–32)
CREAT SERPL-MCNC: 0.65 MG/DL (ref 0.66–1.25)
CREAT SERPL-MCNC: 0.72 MG/DL (ref 0.66–1.25)
DIFFERENTIAL METHOD BLD: ABNORMAL
ERYTHROCYTE [DISTWIDTH] IN BLOOD BY AUTOMATED COUNT: 12.6 % (ref 10–15)
GFR SERPL CREATININE-BSD FRML MDRD: >90 ML/MIN/{1.73_M2}
GFR SERPL CREATININE-BSD FRML MDRD: >90 ML/MIN/{1.73_M2}
GLUCOSE SERPL-MCNC: 105 MG/DL (ref 70–99)
GLUCOSE SERPL-MCNC: 119 MG/DL (ref 70–99)
HCT VFR BLD AUTO: 24.2 % (ref 40–53)
HGB BLD-MCNC: 8.5 G/DL (ref 13.3–17.7)
MCH RBC QN AUTO: 28.7 PG (ref 26.5–33)
MCHC RBC AUTO-ENTMCNC: 35.1 G/DL (ref 31.5–36.5)
MCV RBC AUTO: 82 FL (ref 78–100)
NUM BPU REQUESTED: 1
PHOSPHATE SERPL-MCNC: 2.7 MG/DL (ref 2.5–4.5)
PHOSPHATE SERPL-MCNC: 2.8 MG/DL (ref 2.5–4.5)
PLATELET # BLD AUTO: 12 10E9/L (ref 150–450)
POTASSIUM SERPL-SCNC: 3 MMOL/L (ref 3.4–5.3)
POTASSIUM SERPL-SCNC: 3.4 MMOL/L (ref 3.4–5.3)
PROT SERPL-MCNC: 7.3 G/DL (ref 6.8–8.8)
RBC # BLD AUTO: 2.96 10E12/L (ref 4.4–5.9)
SODIUM SERPL-SCNC: 138 MMOL/L (ref 133–144)
SODIUM SERPL-SCNC: 140 MMOL/L (ref 133–144)
URATE SERPL-MCNC: 1.9 MG/DL (ref 3.5–7.2)
URATE SERPL-MCNC: 2 MG/DL (ref 3.5–7.2)
WBC # BLD AUTO: 0.1 10E9/L (ref 4–11)

## 2020-08-06 PROCEDURE — 25000125 ZZHC RX 250: Performed by: PEDIATRICS

## 2020-08-06 PROCEDURE — 25000128 H RX IP 250 OP 636: Performed by: PEDIATRICS

## 2020-08-06 PROCEDURE — 25000128 H RX IP 250 OP 636: Performed by: STUDENT IN AN ORGANIZED HEALTH CARE EDUCATION/TRAINING PROGRAM

## 2020-08-06 PROCEDURE — 84100 ASSAY OF PHOSPHORUS: CPT | Performed by: NURSE PRACTITIONER

## 2020-08-06 PROCEDURE — P9037 PLATE PHERES LEUKOREDU IRRAD: HCPCS | Performed by: NURSE PRACTITIONER

## 2020-08-06 PROCEDURE — 25800030 ZZH RX IP 258 OP 636: Performed by: NURSE PRACTITIONER

## 2020-08-06 PROCEDURE — 80053 COMPREHEN METABOLIC PANEL: CPT | Performed by: STUDENT IN AN ORGANIZED HEALTH CARE EDUCATION/TRAINING PROGRAM

## 2020-08-06 PROCEDURE — 25800030 ZZH RX IP 258 OP 636: Performed by: PEDIATRICS

## 2020-08-06 PROCEDURE — 84100 ASSAY OF PHOSPHORUS: CPT | Performed by: STUDENT IN AN ORGANIZED HEALTH CARE EDUCATION/TRAINING PROGRAM

## 2020-08-06 PROCEDURE — 85027 COMPLETE CBC AUTOMATED: CPT

## 2020-08-06 PROCEDURE — 25000132 ZZH RX MED GY IP 250 OP 250 PS 637: Performed by: PEDIATRICS

## 2020-08-06 PROCEDURE — 25000132 ZZH RX MED GY IP 250 OP 250 PS 637: Performed by: STUDENT IN AN ORGANIZED HEALTH CARE EDUCATION/TRAINING PROGRAM

## 2020-08-06 PROCEDURE — 20600000 ZZH R&B BMT

## 2020-08-06 PROCEDURE — 80048 BASIC METABOLIC PNL TOTAL CA: CPT | Performed by: NURSE PRACTITIONER

## 2020-08-06 PROCEDURE — 25000132 ZZH RX MED GY IP 250 OP 250 PS 637: Performed by: NURSE PRACTITIONER

## 2020-08-06 PROCEDURE — 82330 ASSAY OF CALCIUM: CPT | Performed by: NURSE PRACTITIONER

## 2020-08-06 PROCEDURE — 87040 BLOOD CULTURE FOR BACTERIA: CPT | Performed by: STUDENT IN AN ORGANIZED HEALTH CARE EDUCATION/TRAINING PROGRAM

## 2020-08-06 PROCEDURE — 84550 ASSAY OF BLOOD/URIC ACID: CPT | Performed by: NURSE PRACTITIONER

## 2020-08-06 PROCEDURE — 25000128 H RX IP 250 OP 636: Performed by: NURSE PRACTITIONER

## 2020-08-06 PROCEDURE — 84550 ASSAY OF BLOOD/URIC ACID: CPT | Performed by: STUDENT IN AN ORGANIZED HEALTH CARE EDUCATION/TRAINING PROGRAM

## 2020-08-06 RX ORDER — SODIUM CHLORIDE 9 MG/ML
INJECTION, SOLUTION INTRAVENOUS CONTINUOUS
Status: DISCONTINUED | OUTPATIENT
Start: 2020-08-06 | End: 2020-08-14 | Stop reason: HOSPADM

## 2020-08-06 RX ORDER — DOPAMINE HYDROCHLORIDE 160 MG/100ML
1 INJECTION, SOLUTION INTRAVENOUS CONTINUOUS
Status: DISCONTINUED | OUTPATIENT
Start: 2020-08-06 | End: 2020-08-11

## 2020-08-06 RX ORDER — ACETAMINOPHEN 325 MG/1
325 TABLET ORAL ONCE
Status: COMPLETED | OUTPATIENT
Start: 2020-08-06 | End: 2020-08-06

## 2020-08-06 RX ORDER — ACETAMINOPHEN 325 MG/1
975 TABLET ORAL EVERY 6 HOURS PRN
Status: DISCONTINUED | OUTPATIENT
Start: 2020-08-06 | End: 2020-08-14 | Stop reason: HOSPADM

## 2020-08-06 RX ORDER — OXYCODONE HYDROCHLORIDE 5 MG/1
5 TABLET ORAL EVERY 4 HOURS PRN
Status: DISCONTINUED | OUTPATIENT
Start: 2020-08-06 | End: 2020-08-07

## 2020-08-06 RX ADMIN — VALACYCLOVIR HYDROCHLORIDE 1000 MG: 500 TABLET, FILM COATED ORAL at 15:50

## 2020-08-06 RX ADMIN — PHYTONADIONE: 1 INJECTION, EMULSION INTRAMUSCULAR; INTRAVENOUS; SUBCUTANEOUS at 20:28

## 2020-08-06 RX ADMIN — CEFEPIME 2000 MG: 2 INJECTION, POWDER, FOR SOLUTION INTRAVENOUS at 09:06

## 2020-08-06 RX ADMIN — MICAFUNGIN SODIUM 50 MG: 10 INJECTION, POWDER, LYOPHILIZED, FOR SOLUTION INTRAVENOUS at 20:28

## 2020-08-06 RX ADMIN — ACETAMINOPHEN 325 MG: 325 TABLET, FILM COATED ORAL at 21:04

## 2020-08-06 RX ADMIN — ACETAMINOPHEN 650 MG: 325 TABLET, FILM COATED ORAL at 03:23

## 2020-08-06 RX ADMIN — CEFEPIME 2000 MG: 2 INJECTION, POWDER, FOR SOLUTION INTRAVENOUS at 00:13

## 2020-08-06 RX ADMIN — OXYCODONE HYDROCHLORIDE 5 MG: 5 TABLET ORAL at 19:57

## 2020-08-06 RX ADMIN — CEFEPIME 2000 MG: 2 INJECTION, POWDER, FOR SOLUTION INTRAVENOUS at 17:06

## 2020-08-06 RX ADMIN — ACETAMINOPHEN 650 MG: 325 TABLET, FILM COATED ORAL at 09:05

## 2020-08-06 RX ADMIN — SODIUM CHLORIDE 1000 ML: 9 INJECTION, SOLUTION INTRAVENOUS at 10:28

## 2020-08-06 RX ADMIN — VALACYCLOVIR HYDROCHLORIDE 1000 MG: 500 TABLET, FILM COATED ORAL at 20:27

## 2020-08-06 RX ADMIN — VALACYCLOVIR HYDROCHLORIDE 1000 MG: 500 TABLET, FILM COATED ORAL at 09:06

## 2020-08-06 RX ADMIN — PANTOPRAZOLE SODIUM 40 MG: 20 TABLET, DELAYED RELEASE ORAL at 09:06

## 2020-08-06 RX ADMIN — OXYCODONE HYDROCHLORIDE 5 MG: 5 TABLET ORAL at 15:46

## 2020-08-06 RX ADMIN — TOCILIZUMAB 478 MG: 20 INJECTION, SOLUTION, CONCENTRATE INTRAVENOUS at 18:27

## 2020-08-06 RX ADMIN — DIPHENHYDRAMINE HYDROCHLORIDE 50 MG: 50 INJECTION, SOLUTION INTRAMUSCULAR; INTRAVENOUS at 15:13

## 2020-08-06 RX ADMIN — I.V. FAT EMULSION 250 ML: 20 EMULSION INTRAVENOUS at 20:28

## 2020-08-06 RX ADMIN — DOPAMINE HYDROCHLORIDE 2 MCG/KG/MIN: 160 INJECTION, SOLUTION INTRAVENOUS at 11:19

## 2020-08-06 RX ADMIN — ACETAMINOPHEN 650 MG: 325 TABLET, FILM COATED ORAL at 15:13

## 2020-08-06 RX ADMIN — DIPHENHYDRAMINE HYDROCHLORIDE 50 MG: 50 INJECTION, SOLUTION INTRAMUSCULAR; INTRAVENOUS at 09:23

## 2020-08-06 RX ADMIN — OXYCODONE HYDROCHLORIDE 5 MG: 5 TABLET ORAL at 11:50

## 2020-08-06 NOTE — PROGRESS NOTES
Pediatric BMT Daily Progress Note    Interval Events:  Continues to spike fevers, with chills, tylenol not effective. Complains of stiff neck and intermittent headaches. Insomnia last night, melatonin prescribed. Vomiting early this morning,     Review of Systems: Pertinent positives include those mentioned in interval events. A complete review of systems was performed and is otherwise negative.      Medications:  Please see MAR    Physical Exam:  Temp:  [99.7  F (37.6  C)-103.5  F (39.7  C)] 102.1  F (38.9  C)  Pulse:  [] 101  Heart Rate:  [104] 104  Resp:  [16-24] 22  BP: ()/(50-66) 101/64  SpO2:  [99 %-100 %] 100 %  I/O last 3 completed shifts:  In: 2019.6 [P.O.:30; I.V.:540]  Out: 1425 [Urine:1425]  GEN: Sitting up in bed, vomiting, not in acute distress. Mother present.   HEENT: Full head of hair, normocephalic, sclerae clear,  nares patent, OP with mild erythema and no visible lesions.  CARD: RRR, normal S1/S2 without murmur. Cap refill < 2 sec.   RESP: Lungs CTA bilaterally without adventitious lung sounds  ABD: Soft NT, ND, no organomegaly.  EXTREM: warm and well perfused.   SKIN: Pale throughout,  otherwise no notable rash  ACCESS: CVC and port    Labs:  Labs reviewed, pertinent findings WBC 0.1, Hgb 8.5, platelets 12K. 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 CD19 positive (dim).      Day +10. Isael completed lymphodepleting chemotherapy prep as an outpatient. He was admitted for fever on 7/24, blood cultures no growth to date.     Exhibiting signs of grade 3 CRS today with spiking fevers, hypotension, vomiting, headache, neck pain. Gave fluid bolus, started Dopamine, monitoring on unit at time of note.     ALL and BMT: High risk B cell ALL (CNS negative) + JAK2 activation.  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  - Completed apheresis collection in the [facility] (6/2020).   - He has been receiving bridging chemotherapy with oral 6MP, methotrexate and ruxolitinib. All oral chemotherapy stopped on 7/12.   - BM revealed 75% involvement with leukemia. CSF was negative.   - Outpatient lymphodepleting chemotherapy per [id number] with cytoxan and fludarabine 7/21-7/24 completed as an outpatient  - Awaiting count recovery    [facility] infusion 7/27   - Pre-meds of Tylenol and Benadryl   - Flush completed.  - No steroid post kymriah unless needed for severe blood product transfusion reaction.      BMT Daily CARTOX Note (complete days 0-14 and with any subsequent CRS/neurotoxicity)    Date: August 6, 2020    Artemio Nino ([id number]) is a 20 year old year old male who received infusion on 7/27, currently day +10 of CAR-T cell therapy Kymriah    Vital Signs: BP 98/62   Pulse 101   Temp 101.7  F (38.7  C)   Resp 20   Wt 59.7 kg (131 lb 9.8 oz)   SpO2 100%   BMI 19.38 kg/m      1) CRS Grading (based ONLY on parameters in box below)    Fever:   > 100.4    Blood pressure:   SBP < 90, requiring one vasopressor (with or without vasopressin)    Oxygen saturation:    >/= 90% on room air (not hypoxic)    CRS Parameter Grade 1  Grade 2 Grade 3 Grade 4   Fever* Tm >= 100.4 degrees F Tm >= 100.4 degrees F Tm >= 100.4 degrees F Tm >= to 100.4  degrees F      With Either:  Hypotension (SBP <90) None Responsive to Fluids  Requiring 1  vasopressor (w/ or w/o vasopressin) Requiring multiple  vasopressors  (excluding  vasopressin)     And/or  Hypoxia (O2 sats <90% on room air) None Low-flow nasal  cannula or blow-by High-flow nasal  cannula, face mask,  non-rebreather mask,or Venturi mask  Requiring positive  pressure (CPAP,  BiPAP intubation  and  mechanical  ventilation)     CRS Summary  Does patient have CRS? Yes, date of onset: 8/6/2020  Current CRS ndgndrndanddndend:nd nd2nd What therapy was given: One 500 mL fluid bolus, dopamine 2 mcg/kg/min  Has CRS grade changed?  Yes, new grade and date of change: 8/7/2020  Has CRS resolved? n/a or No      2) Neurotoxicity:    ICE Assessment  Orientation to year, month, city, hospital: 4 points, Name 3 objects (e.g., point to clock, pen, button): 3 point, Following commands: (e.g., Show me 2 fingers): 1 point, Write a standard sentence (e.g., The pickett jumped over the log): 1 point and Count backwards from 100 by ten: 1 point  Total points: 10: No impairment    ASTCT ICANS Consensus Grading for Adults  ICE Score   10    Seizure   No seizures    Motor Findings   No motor findings    Elevated ICP/Cerebral Edema NA      Neurotoxicity  Domain Grade 1 Grade 2 Grade 3 Grade 4   ICE score 7-9 3-6 1-2 0   Depressed LOC      Awakens  spontaneously Awakens to  voice  Awakens only to  tactile stimulation Unarousable or  requires vigorous  or repetitive  tactile stimuli to  arouse. Stupor  or coma.   Seizure n/a n/a Any clinical  seizure focal or  generalized that  resolves rapidly  or nonconvulsive  seizures on EEG  that resolve with  intervention  Life-threatening  prolonged  seizure (>5 min);  or Repetitive  clinical or  electrical  seizures without  return to baseline  in between    Motor Findings      n/a n/a n/a Deep focal motor  weakness such  as hemiparesis  or paraparesis   Elevated  ICP/cerebral  edema  n/a n/a Focal/local  edema on  neuroimaging  Diffuse cerebral  edema on  neuroimaging;  decerebrate or  decorticate  posturing; or  cranial nerve VI  palsy; or  papilledema; or  Cushing's triad     ICANS grade is determined by the most severe event (ICE score, level of consciousness, seizure, motor findings, raised ICP/cerebral edema) not attributable to any other cause; for example, a patient with an ICE score of 3 who has a  generalized seizure is classified as grade 3 ICANS.    A patient with an ICE score of 0 may be classified as grade 3 ICANS if awake with global aphasia, but a patient with an ICE score of 0 may be classified as grade 4 ICANS if unarousable.     Depressed level of consciousness should be attributable to no other cause (eg, no sedating medication)    Tremors and myoclonus associated with immune effector cell therapies may be graded according to CTCAE v5.0,but they do not influence ICANS grading.     Intracranial hemorrhage with or without associated edema is not considered a neurotoxicity feature and is  excluded from ICANS grading. It may be graded according to CTCAE v5.0.          Neurotoxicity Summary  Does patient have neurotoxicity? No  Current Neurotoxicity score (0-10): 10  What therapy was given: None  Has neurotoxicity grade changed? NA  Has neurotoxicity resolved? NA    3) Organ CAR-T toxicity:    Cardiac   none    Respiratory   none    Gastrointestinal   vomiting    Hepatic    none    Skin   none     Coagulopathy    none     Other:       4) HLH   Ferritin > 10,000 plus any TWO of the following: NA       * CTCAE grading can be found at   https://ctep.cancer.gov/protocoldevelopment/electronic_applications/docs/CTCAE_v5_Quick Reference_8.5x11.pdf      Date: August 5, 2020    Artemio Nino ([id number]) is a 20 year old year old male who received infusion on 7/27/20, currently day +9 of CAR-T cell therapy Kymriah    Vital Signs: BP 96/60   Pulse 101   Temp 102.1  F (38.9  C) (Axillary)   Resp 22   Wt 59.7 kg (131 lb 9.8 oz)   SpO2 100%   BMI 19.38 kg/m      Organ CAR-T toxicity:    Cardiac: No   Pulmonary: No   Renal: No   Liver: No   Coagulopathy: No   Neurologic: No   Immune: No    Overall CRS grade Grade 1 (on 8/5)    CRS Parameter Grade 1 Grade 2 Grade 3 Grade 4   Fever* Tm >= 100.4 degrees F Tm >= 100.4 degrees F Tm >= 100.4 degrees F Tm >= to 100.4 degrees F       With Either:  Hypotension None  Responsive to Fluids Requiring 1 vasopressor (w/ or w/o vasopressin) Requiring multiple vasopressors (excluding vasopressin)     And/or  Hypoxia None Low-flow nasal cannula or blow-by High-flow nasal cannula, face mask, non-rebreather mask, or Venturi mask Requiring positive pressure (CPAP, BiPAP intubation and mechanical ventilation)       * Definition of High Dose Pressors for Grade 4  Vasopressor Duration >= 3 hours   Norepinephrine monotherapy >= 20 mcg/kg/minute   Dopamine monotherapy >= 10 mcg/kg/minute   Phenylephrine monotherapy >= 200 mcg/minute   Epinephrine monotherapy >= 10 mcg/minute   If on vasopressin + another agent High dose of vasopressin + norepi equivalent (using VASST formula below) is >= 10 mcg/minute   If on combination vasopressors, not including vasopressin Norepi equivalent of > 20 mcg/minute (using VASST formula below)     VASST formula: Norepi equivalent dose = [NE (mcg/min)] + [dopamine (mcg/min) / 2] + [epinephrine (mcg/min)] + [phenylephrine (mcg/min) / 10]    ICE Assessment  Orientation to year, month, city, hospital: 4 points  Name 3 objects (e.g., point to clock, pen, button): 3 points  Following commands: (e.g., Show me 2 fingers): 1 point   Write a standard sentence (e.g., The pickett jumped over the log): 1 point   Count backwards from 100 by ten: 1 point  Total points: 10: No impairment    CRS Summary  Does patient have CRS? Yes, date of onset: 7/28/20  Current CRS ndgndrndanddndend:nd nd2nd What is the maximum severity to date: Grade 3 (fever and hypotension)  What therapy was given: Antibiotics IV  Has CRS grade changed? Yes, previously 0  Has CRS resolved? No    Neurotoxicity Summary  Does patient have neurotoxicity? No  Current Neurotoxicity score (0-10): 0  What is the maximum severity to date:  0  What therapy was given: No  Has neurotoxicity resolved? N/A    FEN/Renal:   # Risk for malnutrition: Appetite remains decreased,  starting to eat small amounts and is drinking now also.  - Continue  nutritional support with TPN/IL  - Age appropriate diet   - Monitor nutritional intake     # Risk for electrolyte abnormalities:  - Check daily electrolytes     # Risk for renal dysfunction/risk for ELIZ secondary to potential tumor lysis with preparatory chemotherapy: GFR 133ml/min  - Daily electrolyte monitoring  - Monitor daily weights     # Risk for TLS: Uric Acid 1.9 today. Plan to recheck this evening, plus BMP and phosphorus level. Restart allopurinol if uric acid > 2.5  - Daily TLS labs     Pulmonary:  # Risk for pulmonary insufficiency: No current concerns. Normal chest and sinus CTs during work-up.      Cardiovascular:  # Aaron-Parkinson-White Syndrome: diagnosed upon syncope in 02/2018. Asymptomatic, no interventions to date. No current concerns.  - EKG 7/15 revealed WPW with normal sinus rhythm.   - Echocardiogram 7/15 showed LVEF of 62%  - Cardiology to decide if they want to assess him.     Heme:   # Pancytopenia secondary to chemotherapy:  - Transfuse for hemoglobin < 7, platelets < 20,000, platelet parameter increased 8/7 due to mild epistaxis & hematemesis with streaks of blood.   -  Mild hives noted at end of pRBC transfusion on 7/25. Premedicate with Tylenol and Benadryl for pRBC transfusions.   - Recent history of significant hives with platelet transfusions. Was being premedicated with hydrocortisone, tylenol, and benadryl for platelets. However, no hydrocortisone premedication ordered now given CAR-T. If severe reaction then ok to give steroids per Dr. Zavala.   - No GCSF per protocol     Infectious Disease:  # Recent Febrile Neutropenia: Admitted for fever. Spiking fevers, thought related to CRS, cultures continue to be drawn with fevers. S/p vancomycin.  Continue Cefepime and monitor blood cultures. Blood cultures have remained negative or NGTD.   - Covid negative 7/17 and 7/24     # Risk for infection given immunocompromised status:    Active: See above  Prophylaxis:                                                                       - Viral prophylaxis: HD Valtrex continue through Day +30  - Fungal prophylaxis: Micafungin continue through Day +30. History of photoxic drug eruption with voriconazole, avoid use.   - Bacterial prophylaxis: Cefepime as noted above.  - PCP prophylaxis: Bactrim to begin day +28      GI:   # Nausea: Vomited this morning, blood streaked, increased platelet parameter as noted above.  - Scheduled medications: None  - PRN medications: Benadryl PRN     # Acid reflux:  Protonix once daily. Continue TUMS prn.     # Recent Transaminitis:  Possibly related to recent chemotherapy. May warrant RUQ US to further assess persistent transamnitis.       Neuro:  # Mucositis/pain: Throat pain intermittent, no complaints today (8/6).  - Oxycodone PRN available  - Tramadol prn    # Headache: New onset past 24-36 hours, intermittent, tylenol not helpful. Improved post oxycodone today (8/6).    # Neck pain: Isael thought due to poor sleep position, new onset past 24 hours (8/6). Also improved with oxycodone.     Oral Hygiene:   # Receding gums, risk for infection  - Continue chlorhexidine mouth wash for symptomatic control, pain now improved     Derm:  No current rash. He is no longer using creams.     Fertility:  Sperm collected pre chemotherapy for fertility preservation    Discharge Considerations: Expected lengths of hospitalization for patients with complications from stem cell transplant vary based on the complication(s) and severity(ies). A typical stay is 7-14 days.    The above plan of care was developed by and communicated to me by the Pediatric BMT attending physician, MD Lisa Tai CPNP  [facility]  Pediatric Blood and Marrow Transplant    Pediatric BMT Inpatient Attending Note:    Artemio was seen and evaluated by me today.       The significant interval history includes: Isael developed high grade  fevers overnight and was noted to have softer blood pressures this morning. He received fluid bolus and was started on low dose dopamine infusion which has been maintaining good pressure. He continues to be on room air requiring no supplemental oxygen and continues to maintain good saturations. He reported some headache with fever this morning, not relieved with tylenol. He was otherwise oriented and displayed no other signs concerning for neurotoxicity. Blood cultures continue to be negative. This seems to be consistent with cytokine release syndrome grade 3. His mouth/throat pain is stable. He continues to take little orally. He remains neutropenic.  We will maintain his antibiotics and TPN.      I have reviewed the vital signs, medications and lab results.  I assisted in formulating a plan, which was instituted by the BMT team. The total amount of time spent in the admission and care of Artemio Nino today was >70 minutes, at least 50% of which was counseling and coordination of care.     Sid Girard MD    Pediatric Blood and Marrow Transplant   [facility]  Pager: [phone]         Patient Active Problem List   Diagnosis     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)     Aaron-Parkinson-White (WPW) syndrome     Bone marrow transplant candidate     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)     At risk for infection     S/P allogeneic bone marrow transplant (H)     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

## 2020-08-06 NOTE — PLAN OF CARE
Tmax 103.1. Cultures drawn. MD notified. Tylenol given x 2. HR  increased with fever . OVSS. Lungs clear on RA. No c/o of pain/nausea. Mom at bedside. Hourly rounding done

## 2020-08-07 ENCOUNTER — APPOINTMENT (OUTPATIENT)
Dept: CT IMAGING | Facility: CLINIC | Age: 20
End: 2020-08-07
Attending: NURSE PRACTITIONER
Payer: COMMERCIAL

## 2020-08-07 LAB
ABO + RH BLD: NORMAL
ABO + RH BLD: NORMAL
ALBUMIN SERPL-MCNC: 2.9 G/DL (ref 3.4–5)
ALBUMIN SERPL-MCNC: 2.9 G/DL (ref 3.4–5)
ALP SERPL-CCNC: 52 U/L (ref 40–150)
ALP SERPL-CCNC: 57 U/L (ref 40–150)
ALT SERPL W P-5'-P-CCNC: 64 U/L (ref 0–70)
ALT SERPL W P-5'-P-CCNC: 69 U/L (ref 0–70)
ANION GAP SERPL CALCULATED.3IONS-SCNC: 5 MMOL/L (ref 3–14)
ANION GAP SERPL CALCULATED.3IONS-SCNC: 6 MMOL/L (ref 3–14)
APTT PPP: 43 SEC (ref 22–37)
AST SERPL W P-5'-P-CCNC: 26 U/L (ref 0–45)
AST SERPL W P-5'-P-CCNC: 34 U/L (ref 0–45)
BACTERIA SPEC CULT: NO GROWTH
BACTERIA SPEC CULT: NO GROWTH
BILIRUB SERPL-MCNC: 1 MG/DL (ref 0.2–1.3)
BILIRUB SERPL-MCNC: 1.1 MG/DL (ref 0.2–1.3)
BLD GP AB SCN SERPL QL: NORMAL
BLD PROD TYP BPU: NORMAL
BLD UNIT ID BPU: 0
BLOOD BANK CMNT PATIENT-IMP: NORMAL
BLOOD PRODUCT CODE: NORMAL
BPU ID: NORMAL
BUN SERPL-MCNC: 17 MG/DL (ref 7–30)
BUN SERPL-MCNC: 18 MG/DL (ref 7–30)
CA-I BLD-MCNC: 4.7 MG/DL (ref 4.4–5.2)
CALCIUM SERPL-MCNC: 8.1 MG/DL (ref 8.5–10.1)
CALCIUM SERPL-MCNC: 8.3 MG/DL (ref 8.5–10.1)
CHLORIDE SERPL-SCNC: 106 MMOL/L (ref 94–109)
CHLORIDE SERPL-SCNC: 107 MMOL/L (ref 94–109)
CO2 SERPL-SCNC: 25 MMOL/L (ref 20–32)
CO2 SERPL-SCNC: 26 MMOL/L (ref 20–32)
CREAT SERPL-MCNC: 0.58 MG/DL (ref 0.66–1.25)
CREAT SERPL-MCNC: 0.63 MG/DL (ref 0.66–1.25)
CRP SERPL-MCNC: 78.6 MG/L (ref 0–8)
D DIMER PPP FEU-MCNC: 17.8 UG/ML FEU (ref 0–0.5)
DIFFERENTIAL METHOD BLD: ABNORMAL
DIFFERENTIAL METHOD BLD: ABNORMAL
ERYTHROCYTE [DISTWIDTH] IN BLOOD BY AUTOMATED COUNT: 12.3 % (ref 10–15)
ERYTHROCYTE [DISTWIDTH] IN BLOOD BY AUTOMATED COUNT: 12.4 % (ref 10–15)
FERRITIN SERPL-MCNC: 8154 NG/ML (ref 26–388)
FIBRINOGEN PPP-MCNC: 367 MG/DL (ref 200–420)
GFR SERPL CREATININE-BSD FRML MDRD: >90 ML/MIN/{1.73_M2}
GFR SERPL CREATININE-BSD FRML MDRD: >90 ML/MIN/{1.73_M2}
GLUCOSE SERPL-MCNC: 117 MG/DL (ref 70–99)
GLUCOSE SERPL-MCNC: 127 MG/DL (ref 70–99)
HCT VFR BLD AUTO: 19.3 % (ref 40–53)
HCT VFR BLD AUTO: 19.4 % (ref 40–53)
HGB BLD-MCNC: 6.9 G/DL (ref 13.3–17.7)
HGB BLD-MCNC: 7 G/DL (ref 13.3–17.7)
INR PPP: 1.39 (ref 0.86–1.14)
LDH SERPL L TO P-CCNC: 205 U/L (ref 85–227)
Lab: NORMAL
MAGNESIUM SERPL-MCNC: 1.8 MG/DL (ref 1.6–2.3)
MAGNESIUM SERPL-MCNC: 1.9 MG/DL (ref 1.6–2.3)
MCH RBC QN AUTO: 28.5 PG (ref 26.5–33)
MCH RBC QN AUTO: 29.2 PG (ref 26.5–33)
MCHC RBC AUTO-ENTMCNC: 35.6 G/DL (ref 31.5–36.5)
MCHC RBC AUTO-ENTMCNC: 36.3 G/DL (ref 31.5–36.5)
MCV RBC AUTO: 80 FL (ref 78–100)
MCV RBC AUTO: 80 FL (ref 78–100)
NUM BPU REQUESTED: 1
NUM BPU REQUESTED: 2
PHOSPHATE SERPL-MCNC: 3 MG/DL (ref 2.5–4.5)
PHOSPHATE SERPL-MCNC: 3.6 MG/DL (ref 2.5–4.5)
PLATELET # BLD AUTO: 12 10E9/L (ref 150–450)
PLATELET # BLD AUTO: 19 10E9/L (ref 150–450)
POTASSIUM SERPL-SCNC: 3.3 MMOL/L (ref 3.4–5.3)
POTASSIUM SERPL-SCNC: 3.8 MMOL/L (ref 3.4–5.3)
PROT SERPL-MCNC: 6.9 G/DL (ref 6.8–8.8)
PROT SERPL-MCNC: 6.9 G/DL (ref 6.8–8.8)
RBC # BLD AUTO: 2.4 10E12/L (ref 4.4–5.9)
RBC # BLD AUTO: 2.42 10E12/L (ref 4.4–5.9)
SODIUM SERPL-SCNC: 137 MMOL/L (ref 133–144)
SODIUM SERPL-SCNC: 138 MMOL/L (ref 133–144)
SPECIMEN EXP DATE BLD: NORMAL
SPECIMEN SOURCE: NORMAL
TRANSFUSION STATUS PATIENT QL: NORMAL
URATE SERPL-MCNC: 1.4 MG/DL (ref 3.5–7.2)
URATE SERPL-MCNC: 1.9 MG/DL (ref 3.5–7.2)
WBC # BLD AUTO: 0.1 10E9/L (ref 4–11)
WBC # BLD AUTO: 0.1 10E9/L (ref 4–11)
YEAST SPEC QL CULT: NO GROWTH
YEAST SPEC QL CULT: NO GROWTH

## 2020-08-07 PROCEDURE — P9040 RBC LEUKOREDUCED IRRADIATED: HCPCS | Performed by: STUDENT IN AN ORGANIZED HEALTH CARE EDUCATION/TRAINING PROGRAM

## 2020-08-07 PROCEDURE — 80053 COMPREHEN METABOLIC PANEL: CPT | Performed by: STUDENT IN AN ORGANIZED HEALTH CARE EDUCATION/TRAINING PROGRAM

## 2020-08-07 PROCEDURE — 80053 COMPREHEN METABOLIC PANEL: CPT | Performed by: PEDIATRICS

## 2020-08-07 PROCEDURE — 86140 C-REACTIVE PROTEIN: CPT | Performed by: PEDIATRICS

## 2020-08-07 PROCEDURE — 25800030 ZZH RX IP 258 OP 636: Performed by: NURSE PRACTITIONER

## 2020-08-07 PROCEDURE — 25800030 ZZH RX IP 258 OP 636: Performed by: PEDIATRICS

## 2020-08-07 PROCEDURE — 82728 ASSAY OF FERRITIN: CPT | Performed by: PEDIATRICS

## 2020-08-07 PROCEDURE — 25000125 ZZHC RX 250: Performed by: PEDIATRICS

## 2020-08-07 PROCEDURE — 84100 ASSAY OF PHOSPHORUS: CPT | Performed by: STUDENT IN AN ORGANIZED HEALTH CARE EDUCATION/TRAINING PROGRAM

## 2020-08-07 PROCEDURE — 84550 ASSAY OF BLOOD/URIC ACID: CPT | Performed by: PEDIATRICS

## 2020-08-07 PROCEDURE — 25000132 ZZH RX MED GY IP 250 OP 250 PS 637: Performed by: PEDIATRICS

## 2020-08-07 PROCEDURE — 25000128 H RX IP 250 OP 636: Performed by: PEDIATRICS

## 2020-08-07 PROCEDURE — 25000132 ZZH RX MED GY IP 250 OP 250 PS 637: Performed by: STUDENT IN AN ORGANIZED HEALTH CARE EDUCATION/TRAINING PROGRAM

## 2020-08-07 PROCEDURE — 85610 PROTHROMBIN TIME: CPT | Performed by: PEDIATRICS

## 2020-08-07 PROCEDURE — 87040 BLOOD CULTURE FOR BACTERIA: CPT | Performed by: STUDENT IN AN ORGANIZED HEALTH CARE EDUCATION/TRAINING PROGRAM

## 2020-08-07 PROCEDURE — 84100 ASSAY OF PHOSPHORUS: CPT | Performed by: PEDIATRICS

## 2020-08-07 PROCEDURE — 25000132 ZZH RX MED GY IP 250 OP 250 PS 637: Performed by: NURSE PRACTITIONER

## 2020-08-07 PROCEDURE — 83615 LACTATE (LD) (LDH) ENZYME: CPT | Performed by: PEDIATRICS

## 2020-08-07 PROCEDURE — 85379 FIBRIN DEGRADATION QUANT: CPT | Performed by: PEDIATRICS

## 2020-08-07 PROCEDURE — 83735 ASSAY OF MAGNESIUM: CPT | Performed by: PEDIATRICS

## 2020-08-07 PROCEDURE — 85730 THROMBOPLASTIN TIME PARTIAL: CPT | Performed by: PEDIATRICS

## 2020-08-07 PROCEDURE — 84550 ASSAY OF BLOOD/URIC ACID: CPT | Performed by: STUDENT IN AN ORGANIZED HEALTH CARE EDUCATION/TRAINING PROGRAM

## 2020-08-07 PROCEDURE — 83735 ASSAY OF MAGNESIUM: CPT | Performed by: STUDENT IN AN ORGANIZED HEALTH CARE EDUCATION/TRAINING PROGRAM

## 2020-08-07 PROCEDURE — 20600000 ZZH R&B BMT

## 2020-08-07 PROCEDURE — 82330 ASSAY OF CALCIUM: CPT | Performed by: NURSE PRACTITIONER

## 2020-08-07 PROCEDURE — 70450 CT HEAD/BRAIN W/O DYE: CPT

## 2020-08-07 PROCEDURE — P9037 PLATE PHERES LEUKOREDU IRRAD: HCPCS | Performed by: NURSE PRACTITIONER

## 2020-08-07 PROCEDURE — 87103 BLOOD FUNGUS CULTURE: CPT | Performed by: STUDENT IN AN ORGANIZED HEALTH CARE EDUCATION/TRAINING PROGRAM

## 2020-08-07 PROCEDURE — 25000128 H RX IP 250 OP 636: Performed by: NURSE PRACTITIONER

## 2020-08-07 PROCEDURE — 25000128 H RX IP 250 OP 636: Performed by: STUDENT IN AN ORGANIZED HEALTH CARE EDUCATION/TRAINING PROGRAM

## 2020-08-07 PROCEDURE — 85384 FIBRINOGEN ACTIVITY: CPT | Performed by: PEDIATRICS

## 2020-08-07 PROCEDURE — 85027 COMPLETE CBC AUTOMATED: CPT

## 2020-08-07 RX ORDER — MORPHINE SULFATE 2 MG/ML
1 INJECTION, SOLUTION INTRAMUSCULAR; INTRAVENOUS ONCE
Status: COMPLETED | OUTPATIENT
Start: 2020-08-07 | End: 2020-08-07

## 2020-08-07 RX ORDER — ONDANSETRON 2 MG/ML
4 INJECTION INTRAMUSCULAR; INTRAVENOUS EVERY 6 HOURS PRN
Status: DISCONTINUED | OUTPATIENT
Start: 2020-08-07 | End: 2020-08-08

## 2020-08-07 RX ORDER — MORPHINE SULFATE 2 MG/ML
2 INJECTION, SOLUTION INTRAMUSCULAR; INTRAVENOUS EVERY 4 HOURS PRN
Status: DISCONTINUED | OUTPATIENT
Start: 2020-08-07 | End: 2020-08-08

## 2020-08-07 RX ORDER — LORAZEPAM 2 MG/ML
0.5 INJECTION INTRAMUSCULAR EVERY 6 HOURS PRN
Status: DISCONTINUED | OUTPATIENT
Start: 2020-08-07 | End: 2020-08-14 | Stop reason: HOSPADM

## 2020-08-07 RX ADMIN — ACETAMINOPHEN 975 MG: 325 TABLET, FILM COATED ORAL at 21:30

## 2020-08-07 RX ADMIN — VALACYCLOVIR HYDROCHLORIDE 1000 MG: 500 TABLET, FILM COATED ORAL at 13:15

## 2020-08-07 RX ADMIN — VALACYCLOVIR HYDROCHLORIDE 1000 MG: 500 TABLET, FILM COATED ORAL at 07:50

## 2020-08-07 RX ADMIN — CEFEPIME 2000 MG: 2 INJECTION, POWDER, FOR SOLUTION INTRAVENOUS at 07:50

## 2020-08-07 RX ADMIN — TOCILIZUMAB 478 MG: 20 INJECTION, SOLUTION, CONCENTRATE INTRAVENOUS at 11:34

## 2020-08-07 RX ADMIN — LORAZEPAM 0.5 MG: 2 INJECTION INTRAMUSCULAR; INTRAVENOUS at 15:28

## 2020-08-07 RX ADMIN — ACETAMINOPHEN 975 MG: 325 TABLET, FILM COATED ORAL at 12:00

## 2020-08-07 RX ADMIN — MICAFUNGIN SODIUM 50 MG: 10 INJECTION, POWDER, LYOPHILIZED, FOR SOLUTION INTRAVENOUS at 20:18

## 2020-08-07 RX ADMIN — CEFEPIME 2000 MG: 2 INJECTION, POWDER, FOR SOLUTION INTRAVENOUS at 00:19

## 2020-08-07 RX ADMIN — DIPHENHYDRAMINE HYDROCHLORIDE 50 MG: 50 INJECTION, SOLUTION INTRAMUSCULAR; INTRAVENOUS at 12:28

## 2020-08-07 RX ADMIN — OXYCODONE HYDROCHLORIDE 5 MG: 5 TABLET ORAL at 00:18

## 2020-08-07 RX ADMIN — ONDANSETRON 4 MG: 2 INJECTION INTRAMUSCULAR; INTRAVENOUS at 18:14

## 2020-08-07 RX ADMIN — CEFEPIME 2000 MG: 2 INJECTION, POWDER, FOR SOLUTION INTRAVENOUS at 17:05

## 2020-08-07 RX ADMIN — MORPHINE SULFATE 2 MG: 2 INJECTION, SOLUTION INTRAMUSCULAR; INTRAVENOUS at 21:08

## 2020-08-07 RX ADMIN — OXYCODONE HYDROCHLORIDE 5 MG: 5 TABLET ORAL at 07:50

## 2020-08-07 RX ADMIN — ACETAMINOPHEN 975 MG: 325 TABLET, FILM COATED ORAL at 03:16

## 2020-08-07 RX ADMIN — MORPHINE SULFATE 2 MG: 2 INJECTION, SOLUTION INTRAMUSCULAR; INTRAVENOUS at 17:05

## 2020-08-07 RX ADMIN — SODIUM CHLORIDE: 9 INJECTION, SOLUTION INTRAVENOUS at 20:17

## 2020-08-07 RX ADMIN — CALCIUM CARBONATE (ANTACID) CHEW TAB 500 MG 1000 MG: 500 CHEW TAB at 21:35

## 2020-08-07 RX ADMIN — PANTOPRAZOLE SODIUM 40 MG: 20 TABLET, DELAYED RELEASE ORAL at 07:50

## 2020-08-07 RX ADMIN — MORPHINE SULFATE 1 MG: 2 INJECTION, SOLUTION INTRAMUSCULAR; INTRAVENOUS at 13:15

## 2020-08-07 RX ADMIN — OXYCODONE HYDROCHLORIDE 5 MG: 5 TABLET ORAL at 12:00

## 2020-08-07 RX ADMIN — PHYTONADIONE: 1 INJECTION, EMULSION INTRAMUSCULAR; INTRAVENOUS; SUBCUTANEOUS at 20:09

## 2020-08-07 RX ADMIN — I.V. FAT EMULSION 250 ML: 20 EMULSION INTRAVENOUS at 20:13

## 2020-08-07 RX ADMIN — DOPAMINE HYDROCHLORIDE 7 MCG/KG/MIN: 160 INJECTION, SOLUTION INTRAVENOUS at 06:43

## 2020-08-07 RX ADMIN — OXYCODONE HYDROCHLORIDE 5 MG: 5 TABLET ORAL at 04:08

## 2020-08-07 RX ADMIN — VALACYCLOVIR HYDROCHLORIDE 1000 MG: 500 TABLET, FILM COATED ORAL at 20:17

## 2020-08-07 NOTE — PROGRESS NOTES
CLINICAL NUTRITION SERVICES - REASSESSMENT NOTE     ANTHROPOMETRICS  Height/Length: 175.5 cm  Weight: 58.9 kg  BMI: 19.1 kg/m2- BMI indicates underweight status  Dosing weight: 58.4 kg  Comments:weight stable since starting PN     CURRENT NUTRITION ORDERS  Diet:Regular     CURRENT NUTRITION SUPPORT   Parenteral Nutrition:  Type of Parenteral Access: Central  PN frequency: Cycle, 12 hour     PN of 1450 mLs, GIR of 3.6 mg/kg/min (150 g dextrose), 88 g protein, 1.5 g/kg protein and 250 mL lipids to provide 1360 kcals (23 kcal/kg).  PN is meeting 77% of kcal needs and 100% of protein needs.     Intake/Tolerance: no po recorded yesterday     Current factors affecting nutrition intake include:decreased appetite, pain and side effects of treatment     NEW FINDINGS:  Kymriah Day +11  Exhibiting signs of CRS- fever, hypotension, vomiting, headache     LABS  Labs reviewed     MEDICATIONS  Medications reviewed     ASSESSED NUTRITION NEEDS:  Estimated Energy Needs: 30-35 kcal/kg  Estimated Protein Needs: 1.5 g/kg  Estimated Fluid Needs: 1 mL/kcal     EVALUATION OF PREVIOUS PLAN OF CARE:   Monitoring from previous assessment:  Food and Beverage intake- po less  Enteral and parenteral nutrition intake- continues on PN   Anthropometric measurements- weight stable     Previous Goals:   1. Po and/or nutrition support to meet greater than 75% of needs  2. Weight maintenance   Evaluation: Met     Previous Nutrition Diagnosis:   Inadequate oral intake related to nausea, mucositis, and pain as evidence by decreased po, wt loss and reliance on PN to meet needs.  Evaluation: No change     NUTRITION DIAGNOSIS:  Inadequate oral intake related to nausea/vomiting, fever, and pain as evidence by decreased po and reliance on PN to meet needs.     INTERVENTIONS  Nutrition Prescription  Po/nutrition support to meet needs for weight maintenance     Implementation:  Meals/ Snack- no po recorded. Parenteral Nutrition- Continue with current macro  nutrients. Collaboration and Referral of Nutrition care- pt discussed in rounds.    Goals  1. Po and/or nutrition support to meet greater than 75% of needs  2. Weight maintenance     FOLLOW UP/MONITORING  Food and Beverage intake- monitor po  Enteral and parenteral nutrition intake- adjust as needed and   Anthropometric measurements- monitor wt     RECOMMENDATIONS  1. If needed for better fluid balance uncycle PN  2. If po remains minimal, increase dextrose to 210 g grams (GIR 5 mg/kg/min for 12 hour cycle or 2.5 for 24 hours)     Renetta Schwab, RD, LD, [facility]  [phone]

## 2020-08-07 NOTE — PLAN OF CARE
[[id number]] Febrile, tmax 104.1, cultures drawn and sent to lab. Tylenol given x2. HR 80-90s. LSC on RA, diminished in the bases. Dopamine gtt was increased to 7 mcg/kg/min at the start of my shift. BP checks q15min and then q30min with stable pressures. Switched to q1hr. checks at 0500. Goal is to keep systolic >100 while awake and >90 when asleep. Ideally would like to keep MAP >65. No appetite, no n/v. Voiding well. No stool. Is having neck pain that is causing a headache, treating with oxycodone q4hrs as requested per pt. No concerns for a changes in neuro status, A&Ox4. Will need PRBCs and Plt transfused on day shift. Hourly rounding complete. Will continue to monitor and notify provider of changes.

## 2020-08-07 NOTE — PLAN OF CARE
Febrile, Tmax 103.3, tylenol x2 with minimal relief. HR 100s to 110s. BP 80s to 100s over 40s to 60s, 1L bolus given, dopamine started at 2mc/kg/min and increased to 5mc/kg/min. LSC, satting upper 90s on RA. Pt reporting head and neck pain, oxy x2 with some relief. Tocilizumab given without issue. Platelets infused without reaction, premedicated as ordered. Mom bedside and attentive.

## 2020-08-07 NOTE — PROGRESS NOTES
Pediatric BMT Daily Progress Note    Interval Events:  CRS continues, fever is main symptom now with chills and sweating. BP consistently above 90 systolic on 7 mcg/kg/min of dopamine. Received 1 dose of Tociluzimab last evening. Received 200 mL/hr fluid with IV and TPN last evening. Still has headache and neck discomfort, oxycodone effective. Tylenol for fever, also somewhat effective. Minimal epistaxis.    Review of Systems: Pertinent positives include those mentioned in interval events. A complete review of systems was performed and is otherwise negative.      Medications:  Please see MAR    Physical Exam:  Temp:  [99.5  F (37.5  C)-104.1  F (40.1  C)] 99.5  F (37.5  C)  Pulse:  [] 85  Resp:  [20-26] 20  BP: ()/(43-66) 103/57  SpO2:  [96 %-100 %] 99 %  I/O last 3 completed shifts:  In: 4773.63 [P.O.:500; I.V.:1599.03; IV Piggyback:1000]  Out: 3650 [Urine:3650]  GEN: Lying in bed, reports feeling ok this morning, no immediate distress. Both parents present.  HEENT: Full head of hair, normocephalic, sclerae clear, nares patent, no rhinorrhea, no cough noted during exam.  CARD: RRR, normal S1/S2 without murmur. Cap refill < 2 sec.   RESP: Good air movement noted, no wheezing or crackles on auscultation.  ABD: Soft, non tender.   EXTREM: warm and well perfused.   SKIN: Pale throughout, no bruising or petechia noted  ACCESS: CVC double lumen (L) and single port (R)    Labs:  Labs reviewed, pertinent findings WBC 0.1, Hgb 7.0, platelets 19K. 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 CD19 positive (dim).      Day +11. Isael completed lymphodepleting chemotherapy prep as an outpatient. He was admitted for fever on 7/24, blood cultures no growth to date.     Treated for grade 3 CRS yesterday with  high fevers, hypotension, vomiting.  Responding well to dopamine gtt, received increased IV fluids and 1 dose of Tociluzimab. Still having fevers, headaches, and neck pain. BP improved on dopamine. Will continue dopamine and give 2nd dose of Tociluzimab today. Monitor closely. So far today, Isael has deferred writing the daily sentence per the CART protocol, he has also deferred answering neuro exam questions. He is oriented and not confused based on conversations during cares and exam.    ALL and BMT: High risk B cell ALL (CNS negative) + JAK2 activation.  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  - Completed apheres[facility] collection in the [facility] (6/2020).   - He has been receiving bridging chemotherapy with oral 6MP, methotrexate and ruxolitinib. All oral chemotherapy stopped on 7/12.   - BM revealed 75% involvement with leukemia. CSF was negative.   - Outpatient lymphodepleting chemotherapy per [id number] with cytoxan and fludarabine 7/21-7/24 completed as an outpatient  - Awaiting count recovery    [facility]   - Pre-meds of Tylenol and Benadryl   - Flush completed.  - No steroid post kymriah unless needed for severe blood product transfusion reaction.      BMT Daily CARTOX Note (complete days 0-14 and with any subsequent CRS/neurotoxicity)    Date: August 7, 2020    Artemio Nino ([id number]) is a 20 year old year old male who received infusion on 7/27, currently day +11 of CAR-T cell therapy Kymriah    Vital Signs: /57   Pulse 85   Temp 99.5  F (37.5  C) (Axillary)   Resp 20   Wt 59.7 kg (131 lb 9.8 oz)   SpO2 99%   BMI 19.38 kg/m      1) CRS Grading (based ONLY on parameters in box below)    Fever:   > 100.4    Blood pressure:   SBP >/= 90 (not hypotensive)    Oxygen saturation:    >/= 90% on room air (not hypoxic)    CRS  "Parameter Grade 1  Grade 2 Grade 3 Grade 4   Fever* Tm >= 100.4 degrees F Tm >= 100.4 degrees F Tm >= 100.4 degrees F Tm >= to 100.4  degrees F      With Either:  Hypotension (SBP <90) None Responsive to Fluids  Requiring 1  vasopressor (w/ or w/o vasopressin) Requiring multiple  vasopressors  (excluding  vasopressin)     And/or  Hypoxia (O2 sats <90% on room air) None Low-flow nasal  cannula or blow-by High-flow nasal  cannula, face mask,  non-rebreather mask,or Venturi mask  Requiring positive  pressure (CPAP,  BiPAP intubation and  mechanical  ventilation)     CRS Summary  Does patient have CRS? Yes, date of onset: 7/28/20, initial grade 2  Current CRS thgthrthathdtheth:th th4th What therapy was given:   - 7/28: IV Antibiotics  - 8/6: One 500 mL fluid bolus, dopamine 2 mcg/kg/min, increased dopamine to 7 mcg/kg/min. Gave 1 dose of Tociluzimab. IV fluids 100 mL/hr.  -8/7: Gave 2nd dose of Tociluzimab  Has CRS grade changed?  Yes, 8/6: increased from grade 2 to grade 3, he remains a grade 3 today (8/7).   Has CRS resolved? n/a or No    2) Neurotoxicity: Per package insert, headache and tremors are listed as neurotoxic side effects. Isael first noted his headache and also neck pain to this provider on 8/6 and also later the same day as experiencing \"chills\". On 8/7, he reports the headaches are worse and the neck pain continues. He has full range of motion in his neck and denies sensitivity to light. Oxycodone has been helpful for his neck pain and tylenol has provided some relief for his headaches.     Isael does not appear to have any symptoms of encephalopathy, such as: altered mental status, confusion, acting out of character, nor has he had trouble swallowing or speaking, nor involuntary eye or muscle movements. Neck pain seems to be musculoskeletal, will obtain quick brain CT today, results pending.    ICE Assessment  Orientation to year, month, city, hospital: 4 points, Name 3 objects (e.g., point to clock, pen, button): 3 " point, Following commands: (e.g., Show me 2 fingers): 1 point, Write a standard sentence (e.g., The pickett jumped over the log): 1 point and Count backwards from 100 by ten: 1 point  Total points: 10: No impairment    ASTCT ICANS Consensus Grading for Adults  ICE Score   10    Seizure   No seizures    Motor Findings   No motor findings    Elevated ICP/Cerebral Edema NA      Neurotoxicity  Domain Grade 1 Grade 2 Grade 3 Grade 4   ICE score 7-9 3-6 1-2 0   Depressed LOC      Awakens  spontaneously Awakens to  voice  Awakens only to  tactile stimulation Unarousable or  requires vigorous  or repetitive  tactile stimuli to  arouse. Stupor  or coma.   Seizure n/a n/a Any clinical  seizure focal or  generalized that  resolves rapidly  or nonconvulsive  seizures on EEG  that resolve with  intervention  Life-threatening  prolonged  seizure (>5 min);  or Repetitive  clinical or  electrical  seizures without  return to baseline  in between    Motor Findings      n/a n/a n/a Deep focal motor  weakness such  as hemiparesis  or paraparesis   Elevated  ICP/cerebral  edema  n/a n/a Focal/local  edema on  neuroimaging  Diffuse cerebral  edema on  neuroimaging;  decerebrate or  decorticate  posturing; or  cranial nerve VI  palsy; or  papilledema; or  Cushing's triad     ICANS grade is determined by the most severe event (ICE score, level of consciousness, seizure, motor findings, raised ICP/cerebral edema) not attributable to any other cause; for example, a patient with an ICE score of 3 who has a generalized seizure is classified as grade 3 ICANS.    A patient with an ICE score of 0 may be classified as grade 3 ICANS if awake with global aphasia, but a patient with an ICE score of 0 may be classified as grade 4 ICANS if unarousable.     Depressed level of consciousness should be attributable to no other cause (eg, no sedating medication)    Tremors and myoclonus associated with immune effector cell therapies may be graded according to  CTCAE v5.0,but they do not influence ICANS grading.     Intracranial hemorrhage with or without associated edema is not considered a neurotoxicity feature and is  excluded from ICANS grading. It may be graded according to CTCAE v5.0.        Neurotoxicity Summary  Does patient have neurotoxicity? No  Current Neurotoxicity score (0-10): 10  What therapy was given: None  Has neurotoxicity grade changed? NA  Has neurotoxicity resolved? NA    3) Organ CAR-T toxicity:    Cardiac   none    Respiratory   Cough (8/6, 8/7). Noted by Isael to be very infrequent. Reports that he will cough once, dry sounding, usually not more than once or twice per day.    Gastrointestinal   vomiting (8/6), and (8/7)    Hepatic    none    Skin   none     Coagulopathy    Elevated INR (1.39), PTT (43), Fibrinogen (normal range at 367 mg/dL), D-Dimer (17.8 micrograms/mL), Ferritin (8154 ng/mL), CRP (78.6 mg/dL)    Other:     4) HLH   Ferritin > 10,000 plus any TWO of the following: NA     * CTCAE grading can be found at   https://ctep.cancer.gov/protocoldevelopment/electronic_applications/docs/CTCAE_v5_Quick Reference_8.5x11.pdf    FEN/Renal:   # Risk for malnutrition: Is drinking quite a bit per his report, not eating.  - Continue nutritional support with TPN/IL   - Monitor nutritional intake     # Risk for electrolyte abnormalities:  - Check daily electrolytes     # Risk for renal dysfunction/risk for ELIZ secondary to potential tumor lysis with preparatory chemotherapy: GFR 133ml/min  - Daily electrolyte monitoring  - Monitor daily weights     # Risk for TLS: Uric Acid 1.4 today. Phos 3.6. Restart allopurinol if uric acid > 2.5  - Daily TLS labs     Pulmonary:  # Risk for pulmonary insufficiency: No current concerns. Normal chest and sinus CTs during work-up.      Cardiovascular:  # Aaron-Parkinson-White Syndrome: diagnosed upon syncope in 02/2018. Asymptomatic, no interventions to date. No current concerns.  - EKG 7/15 revealed WPW with normal  sinus rhythm.   - Echocardiogram 7/15 showed LVEF of 62%  - Cardiology to decide if they want to assess him.     Heme:   # Pancytopenia secondary to chemotherapy:  - Transfuse for hemoglobin < 7, platelets < 20,000, platelet parameter increased 8/6 due to mild epistaxis & hematemesis with streaks of blood.   -  Mild hives noted at end of pRBC transfusion on 7/25. Premedicate with Tylenol and Benadryl for pRBC transfusions.   - Recent history of significant hives with platelet transfusions. Was being premedicated with hydrocortisone, tylenol, and benadryl for platelets. However, no hydrocortisone premedication ordered now given CAR-T. If severe reaction then ok to give steroids per Dr. Zavala.   - Isael reported he had a few hives on his leg with platelet transfusion yesterday, non pruritic, self resolved, did not report to nursing.  - No GCSF per protocol     Infectious Disease:  # Recent Febrile Neutropenia: Admitted for fever. Spiking fevers, thought related to CRS, cultures continue to be drawn with fevers. S/p vancomycin.  Continue Cefepime and monitor blood cultures. Blood cultures have remained negative or NGTD.   - Covid negative 7/17 and 7/24     # Risk for infection given immunocompromised status:    Active: See above  Prophylaxis:                                                                      - Viral prophylaxis: HD Valtrex continue through Day +30  - Fungal prophylaxis: Micafungin continue through Day +30. History of photoxic drug eruption with voriconazole, avoid use.   - Bacterial prophylaxis: Cefepime as noted above.  - PCP prophylaxis: Bactrim to begin day +28      GI:   # Nausea: Vomited am 8/6 (none since), blood streaked, increased platelet parameter as noted above.  - Scheduled medications: None  - PRN medications: Benadryl PRN     # Acid reflux:  Protonix once daily. Continue TUMS prn.     # Recent Transaminitis:  Possibly related to recent chemotherapy. May warrant RUQ US to further  assess persistent transamnitis.       Neuro:  # Mucositis/pain: Throat pain intermittent, no complaints today (8/7).  - Oxycodone PRN available  - Tramadol prn    # Headache: 8/6 New onset past 24-36 hours, intermittent, tylenol not helpful. Improved with oxycodone prn. 8/7 headaches continue, however, neck pain is causing the most distress. See Neurotoxicity above for more detail.    # Neck pain: Isael thought due to poor sleep position, new onset past 24 hours (8/6). Also improved with oxycodone. Worse pain noted 8/7. Has full range of motion, per exam seemingly more musculoskeletal. See Neurotoxicity above for more detail.     Oral Hygiene:   # Receding gums, risk for infection  - Continue chlorhexidine mouth wash for symptomatic control, pain now improved     Derm:  No current rash. He is no longer using creams.     Fertility:  Sperm collected pre chemotherapy for fertility preservation    Discharge Considerations: Expected lengths of hospitalization for patients with complications from stem cell transplant vary based on the complication(s) and severity(ies). A typical stay is 7-14 days.    The above plan of care was developed by and communicated to me by the Pediatric BMT attending physician, MD Lisa Tai CPNP  [facility]  Pediatric Blood and Marrow Transplant    Pediatric BMT Inpatient Attending Note:    Artemio was seen and evaluated by me today.       The significant interval history includes: Isael continues to have intermittent fevers, though fever curve is improving. He is on vasopressor support with dopamine and did not have increased requirements through the day. Blood pressures are stable currently. He continues to have Grade 3 CRS and received two doses of tociluzumab so far. He continues to be on room air requiring no supplemental oxygen and continues to maintain good saturations.   Isael also reported localized pain over his occiput and  surrounding neck area, not relived by tylenol or oxycodone. He also had a couple of episodes of emesis while sitting up from the supine position. A head CT was performed which ruled out increased intracranial pressure or the mass effect. We will continue to monitor him closely for signs of neurotoxicity. He was otherwise oriented and displayed no other signs. Blood cultures continue to be negative. His mouth/throat pain is stable. He remains neutropenic.  We will maintain his antibiotics and TPN.      I have reviewed the vital signs, medications and lab results.  I assisted in formulating a plan, which was instituted by the BMT team. The total amount of time spent in the admission and care of Artemio Nino today was >60 minutes, at least 50% of which was counseling and coordination of care.     Sid Girard MD    Pediatric Blood and Marrow Transplant   [facility]  Pager: [phone]         Patient Active Problem List   Diagnosis     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)     Aaron-Parkinson-White (WPW) syndrome     Bone marrow transplant candidate     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)     At risk for infection     S/P allogeneic bone marrow transplant (H)     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

## 2020-08-07 NOTE — PLAN OF CARE
Febrile, Tmax 101. Tylenol x1. HR 80s to low 100s. RR low 20s. BP stable with dopamine at 7mc/kg/min, decreased to 5mc/kg/min this afternoon and BP remains stable. LSC, on RA. Continues to report increasingly severe neck pain and headache, CT scan completed, PRN oxy given with no relief, morphine given x2 with some relief. Emesis x2, reports continuous nausea. Benadryl, ativan, and zofran given with minimal relief. Tocilizumab given without issue. PRBCs and platelets given, premedicated as ordered. Mom bedside and attentive.

## 2020-08-08 LAB
ALBUMIN SERPL-MCNC: 2.8 G/DL (ref 3.4–5)
ALP SERPL-CCNC: 51 U/L (ref 40–150)
ALT SERPL W P-5'-P-CCNC: 67 U/L (ref 0–70)
ANION GAP SERPL CALCULATED.3IONS-SCNC: 5 MMOL/L (ref 3–14)
AST SERPL W P-5'-P-CCNC: 36 U/L (ref 0–45)
BILIRUB SERPL-MCNC: 1.3 MG/DL (ref 0.2–1.3)
BUN SERPL-MCNC: 18 MG/DL (ref 7–30)
CA-I BLD-MCNC: 4.5 MG/DL (ref 4.4–5.2)
CALCIUM SERPL-MCNC: 7.8 MG/DL (ref 8.5–10.1)
CHLORIDE SERPL-SCNC: 106 MMOL/L (ref 94–109)
CO2 SERPL-SCNC: 27 MMOL/L (ref 20–32)
CREAT SERPL-MCNC: 0.7 MG/DL (ref 0.66–1.25)
DIFFERENTIAL METHOD BLD: ABNORMAL
ERYTHROCYTE [DISTWIDTH] IN BLOOD BY AUTOMATED COUNT: 12.6 % (ref 10–15)
GFR SERPL CREATININE-BSD FRML MDRD: >90 ML/MIN/{1.73_M2}
GLUCOSE SERPL-MCNC: 180 MG/DL (ref 70–99)
HCT VFR BLD AUTO: 22.5 % (ref 40–53)
HGB BLD-MCNC: 7.9 G/DL (ref 13.3–17.7)
Lab: NORMAL
Lab: NORMAL
MCH RBC QN AUTO: 29.3 PG (ref 26.5–33)
MCHC RBC AUTO-ENTMCNC: 35.1 G/DL (ref 31.5–36.5)
MCV RBC AUTO: 83 FL (ref 78–100)
PHOSPHATE SERPL-MCNC: 1.8 MG/DL (ref 2.5–4.5)
PLATELET # BLD AUTO: 29 10E9/L (ref 150–450)
POTASSIUM SERPL-SCNC: 3.2 MMOL/L (ref 3.4–5.3)
PROT SERPL-MCNC: 6.4 G/DL (ref 6.8–8.8)
RBC # BLD AUTO: 2.7 10E12/L (ref 4.4–5.9)
SODIUM SERPL-SCNC: 138 MMOL/L (ref 133–144)
SPECIMEN SOURCE: NORMAL
SPECIMEN SOURCE: NORMAL
URATE SERPL-MCNC: 1.7 MG/DL (ref 3.5–7.2)
WBC # BLD AUTO: 0.1 10E9/L (ref 4–11)
YEAST SPEC QL CULT: NO GROWTH
YEAST SPEC QL CULT: NO GROWTH

## 2020-08-08 PROCEDURE — 25800030 ZZH RX IP 258 OP 636: Performed by: PEDIATRICS

## 2020-08-08 PROCEDURE — 86850 RBC ANTIBODY SCREEN: CPT | Performed by: STUDENT IN AN ORGANIZED HEALTH CARE EDUCATION/TRAINING PROGRAM

## 2020-08-08 PROCEDURE — 25000132 ZZH RX MED GY IP 250 OP 250 PS 637: Performed by: PEDIATRICS

## 2020-08-08 PROCEDURE — 25000128 H RX IP 250 OP 636: Performed by: PEDIATRICS

## 2020-08-08 PROCEDURE — 80053 COMPREHEN METABOLIC PANEL: CPT | Performed by: STUDENT IN AN ORGANIZED HEALTH CARE EDUCATION/TRAINING PROGRAM

## 2020-08-08 PROCEDURE — 86901 BLOOD TYPING SEROLOGIC RH(D): CPT | Performed by: STUDENT IN AN ORGANIZED HEALTH CARE EDUCATION/TRAINING PROGRAM

## 2020-08-08 PROCEDURE — 25000128 H RX IP 250 OP 636: Performed by: NURSE PRACTITIONER

## 2020-08-08 PROCEDURE — 20600000 ZZH R&B BMT

## 2020-08-08 PROCEDURE — 25000125 ZZHC RX 250: Performed by: PEDIATRICS

## 2020-08-08 PROCEDURE — 87103 BLOOD FUNGUS CULTURE: CPT | Performed by: STUDENT IN AN ORGANIZED HEALTH CARE EDUCATION/TRAINING PROGRAM

## 2020-08-08 PROCEDURE — 85027 COMPLETE CBC AUTOMATED: CPT

## 2020-08-08 PROCEDURE — 25000128 H RX IP 250 OP 636: Performed by: STUDENT IN AN ORGANIZED HEALTH CARE EDUCATION/TRAINING PROGRAM

## 2020-08-08 PROCEDURE — 84100 ASSAY OF PHOSPHORUS: CPT | Performed by: STUDENT IN AN ORGANIZED HEALTH CARE EDUCATION/TRAINING PROGRAM

## 2020-08-08 PROCEDURE — 87040 BLOOD CULTURE FOR BACTERIA: CPT | Performed by: STUDENT IN AN ORGANIZED HEALTH CARE EDUCATION/TRAINING PROGRAM

## 2020-08-08 PROCEDURE — 82330 ASSAY OF CALCIUM: CPT | Performed by: NURSE PRACTITIONER

## 2020-08-08 PROCEDURE — 84550 ASSAY OF BLOOD/URIC ACID: CPT | Performed by: STUDENT IN AN ORGANIZED HEALTH CARE EDUCATION/TRAINING PROGRAM

## 2020-08-08 PROCEDURE — 25000132 ZZH RX MED GY IP 250 OP 250 PS 637: Performed by: STUDENT IN AN ORGANIZED HEALTH CARE EDUCATION/TRAINING PROGRAM

## 2020-08-08 PROCEDURE — 86900 BLOOD TYPING SEROLOGIC ABO: CPT | Performed by: STUDENT IN AN ORGANIZED HEALTH CARE EDUCATION/TRAINING PROGRAM

## 2020-08-08 PROCEDURE — 86922 COMPATIBILITY TEST ANTIGLOB: CPT | Performed by: STUDENT IN AN ORGANIZED HEALTH CARE EDUCATION/TRAINING PROGRAM

## 2020-08-08 RX ORDER — MORPHINE SULFATE 2 MG/ML
6 INJECTION, SOLUTION INTRAMUSCULAR; INTRAVENOUS EVERY 4 HOURS PRN
Status: DISCONTINUED | OUTPATIENT
Start: 2020-08-08 | End: 2020-08-10

## 2020-08-08 RX ORDER — MORPHINE SULFATE 2 MG/ML
4 INJECTION, SOLUTION INTRAMUSCULAR; INTRAVENOUS EVERY 4 HOURS PRN
Status: DISCONTINUED | OUTPATIENT
Start: 2020-08-08 | End: 2020-08-08

## 2020-08-08 RX ORDER — MORPHINE SULFATE 2 MG/ML
2 INJECTION, SOLUTION INTRAMUSCULAR; INTRAVENOUS ONCE
Status: COMPLETED | OUTPATIENT
Start: 2020-08-08 | End: 2020-08-08

## 2020-08-08 RX ORDER — TRAMADOL HYDROCHLORIDE 50 MG/1
50 TABLET ORAL EVERY 6 HOURS PRN
Status: DISCONTINUED | OUTPATIENT
Start: 2020-08-08 | End: 2020-08-14 | Stop reason: HOSPADM

## 2020-08-08 RX ORDER — ONDANSETRON 2 MG/ML
4 INJECTION INTRAMUSCULAR; INTRAVENOUS EVERY 6 HOURS
Status: DISCONTINUED | OUTPATIENT
Start: 2020-08-08 | End: 2020-08-13

## 2020-08-08 RX ORDER — DIAZEPAM 10 MG/2ML
2 INJECTION, SOLUTION INTRAMUSCULAR; INTRAVENOUS
Status: COMPLETED | OUTPATIENT
Start: 2020-08-08 | End: 2020-08-08

## 2020-08-08 RX ADMIN — ACETAMINOPHEN 975 MG: 325 TABLET, FILM COATED ORAL at 21:16

## 2020-08-08 RX ADMIN — TOCILIZUMAB 478 MG: 20 INJECTION, SOLUTION, CONCENTRATE INTRAVENOUS at 01:38

## 2020-08-08 RX ADMIN — METHYLPREDNISOLONE SODIUM SUCCINATE 32 MG: 40 INJECTION, POWDER, LYOPHILIZED, FOR SOLUTION INTRAMUSCULAR; INTRAVENOUS at 04:57

## 2020-08-08 RX ADMIN — ONDANSETRON 4 MG: 2 INJECTION INTRAMUSCULAR; INTRAVENOUS at 01:14

## 2020-08-08 RX ADMIN — ONDANSETRON 4 MG: 2 INJECTION INTRAMUSCULAR; INTRAVENOUS at 14:12

## 2020-08-08 RX ADMIN — VALACYCLOVIR HYDROCHLORIDE 1000 MG: 500 TABLET, FILM COATED ORAL at 20:13

## 2020-08-08 RX ADMIN — MORPHINE SULFATE 6 MG: 2 INJECTION, SOLUTION INTRAMUSCULAR; INTRAVENOUS at 17:08

## 2020-08-08 RX ADMIN — MORPHINE SULFATE 2 MG: 2 INJECTION, SOLUTION INTRAMUSCULAR; INTRAVENOUS at 00:52

## 2020-08-08 RX ADMIN — PANTOPRAZOLE SODIUM 40 MG: 20 TABLET, DELAYED RELEASE ORAL at 10:09

## 2020-08-08 RX ADMIN — CEFEPIME 2000 MG: 2 INJECTION, POWDER, FOR SOLUTION INTRAVENOUS at 08:05

## 2020-08-08 RX ADMIN — METHYLPREDNISOLONE SODIUM SUCCINATE 60 MG: 40 INJECTION, POWDER, LYOPHILIZED, FOR SOLUTION INTRAMUSCULAR; INTRAVENOUS at 15:25

## 2020-08-08 RX ADMIN — VALACYCLOVIR HYDROCHLORIDE 1000 MG: 500 TABLET, FILM COATED ORAL at 13:59

## 2020-08-08 RX ADMIN — ONDANSETRON 4 MG: 2 INJECTION INTRAMUSCULAR; INTRAVENOUS at 08:05

## 2020-08-08 RX ADMIN — DOPAMINE HYDROCHLORIDE 7 MCG/KG/MIN: 160 INJECTION, SOLUTION INTRAVENOUS at 00:20

## 2020-08-08 RX ADMIN — VALACYCLOVIR HYDROCHLORIDE 1000 MG: 500 TABLET, FILM COATED ORAL at 10:08

## 2020-08-08 RX ADMIN — MELATONIN 5 MG TABLET 5 MG: at 03:27

## 2020-08-08 RX ADMIN — MORPHINE SULFATE 4 MG: 2 INJECTION, SOLUTION INTRAMUSCULAR; INTRAVENOUS at 11:47

## 2020-08-08 RX ADMIN — ONDANSETRON 4 MG: 2 INJECTION INTRAMUSCULAR; INTRAVENOUS at 20:13

## 2020-08-08 RX ADMIN — CEFEPIME 2000 MG: 2 INJECTION, POWDER, FOR SOLUTION INTRAVENOUS at 00:14

## 2020-08-08 RX ADMIN — CEFEPIME 2000 MG: 2 INJECTION, POWDER, FOR SOLUTION INTRAVENOUS at 15:37

## 2020-08-08 RX ADMIN — ACETAMINOPHEN 975 MG: 325 TABLET, FILM COATED ORAL at 03:51

## 2020-08-08 RX ADMIN — PHYTONADIONE: 1 INJECTION, EMULSION INTRAMUSCULAR; INTRAVENOUS; SUBCUTANEOUS at 20:12

## 2020-08-08 RX ADMIN — LORAZEPAM 0.5 MG: 2 INJECTION INTRAMUSCULAR; INTRAVENOUS at 12:11

## 2020-08-08 RX ADMIN — MORPHINE SULFATE 6 MG: 2 INJECTION, SOLUTION INTRAMUSCULAR; INTRAVENOUS at 21:16

## 2020-08-08 RX ADMIN — MORPHINE SULFATE 2 MG: 2 INJECTION, SOLUTION INTRAMUSCULAR; INTRAVENOUS at 13:59

## 2020-08-08 RX ADMIN — MELATONIN 5 MG TABLET 5 MG: at 21:16

## 2020-08-08 RX ADMIN — I.V. FAT EMULSION 250 ML: 20 EMULSION INTRAVENOUS at 20:13

## 2020-08-08 RX ADMIN — MORPHINE SULFATE 4 MG: 2 INJECTION, SOLUTION INTRAMUSCULAR; INTRAVENOUS at 04:55

## 2020-08-08 RX ADMIN — DIAZEPAM 2 MG: 5 INJECTION, SOLUTION INTRAMUSCULAR; INTRAVENOUS at 03:11

## 2020-08-08 RX ADMIN — MICAFUNGIN SODIUM 50 MG: 10 INJECTION, POWDER, LYOPHILIZED, FOR SOLUTION INTRAVENOUS at 20:13

## 2020-08-08 RX ADMIN — MORPHINE SULFATE 2 MG: 2 INJECTION, SOLUTION INTRAMUSCULAR; INTRAVENOUS at 01:37

## 2020-08-08 NOTE — PLAN OF CARE
Febrile, Tmax 100.8. No neuro changes. HR 60s to 110s. BP stable, dopamine decreased twice, currently infusing at 3mc/kg/min. LSC, satting upper 90s on RA. Continues to report severe neck and head pain, morphine x2 with minimal relief. Steroids given with very good response. Emesis x1, zofran given x2. Mom bedside.

## 2020-08-08 NOTE — PROGRESS NOTES
"Pediatric BMT Daily Progress Note    Interval Events:  CRS continues (Grade 3 currently). Improvement in fever curve and hemodynamics over the past 24 hours, but Artemio has worsening neck pain/headache suspected to be associated with CRS/neurotoxicity. Pain is quite atypical with localized area of severe pain in upper cervical area seemingly consistent with musculoskeletal etiology. However, severity of pain and progress concerning for neurotoxicity. CT of the head performed yesterday showing no intracranial pathology. He continues on Dopamine, which was titrated overnight as high as 10 mcg/kg/min, currently at 7 mcg/kg/min. Received 3rd dose of Tociulbimab overnight due to increasing vasopressor requirements and worsening pain. Neck/head pain rated at 8/10-10/10 with no relief despite multiple medication trials. Oxycodone was transitioned to Morphine yesterday, with dosing increased overnight.  Methylprednisolone administered early this morning given suspected neurotoxicity and severe pain. Nausea and vomiting also worsened over the past 24 hours, with some relief following steroid dose. Artemio also complained of his \"lungs hurting\" overnight with cough worsening. Respiratory exam remained reassuring with preservation of oxygen saturations, no increased work of breathing, and lungs remaining clear on exam. Also noted to have difficulty with initiating urinary stream, likely associated with increased narcotic medications. No epistaxis or hematemesis in the past 24 hours.     Review of Systems: Pertinent positives include those mentioned in interval events. A complete review of systems was performed and is otherwise negative.      Medications:  Please see MAR    Physical Exam:  Temp:  [98.4  F (36.9  C)-101  F (38.3  C)] 99.1  F (37.3  C)  Pulse:  [] 86  Resp:  [20-24] 20  BP: ()/(45-84) 100/56  SpO2:  [97 %-100 %] 98 %  I/O last 3 completed shifts:  In: 3928.18 [P.O.:500; I.V.:1978.58]  Out: 0025 " [Urine:2705; Emesis/NG output:370]     GEN: Sitting up in bed, appears very tired with eyes closed, but answers questions appropriately. Mental status intact.   HEENT: Full head of hair, normocephalic, sclerae clear, nares patent, no rhinorrhea, slight intermittent cough present. Mucous membranes moist, no oral lesions appreciated, oropharynx clear.   Neck: Reports significant neck pain isolated to upper cervical area with paraspinal muscle tightness noted bilaterally. No tenderness over cervical vertebral processes. No nuchal rigidity, no sensitivity to light, range of motion in neck normal.   CARD: Mild tachycardia, normal S1/S2 without murmur. Cap refill < 2 sec.   RESP: Good air movement noted, no wheezing or crackles on auscultation. Limited auscultation in posterior lung fields due to pain.   ABD: Soft, non tender, nondistended. No organomegaly appreciated.   EXTREM: warm and well perfused, pulses 2+ throughout.   SKIN: Pale throughout, no bruising or petechia noted  ACCESS: CVC double lumen (L) and single port (R)    Labs:  Labs reviewed, pertinent findings WBC 0.1, Hgb 7.9, platelets 29K. 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 CD19 positive (dim).      Day +12. Isael completed lymphodepleting chemotherapy prep as an outpatient. He was admitted for fever on 7/24, blood cultures no growth to date.     Continues with Grade 3 CRS with  fevers, hypotension requiring vasopressors, nausea/vomiting and worsening severe neck pain with associated headache. Overnight, he received a 3rd dose of Tociluzimab and Methylprednisolone initiated given concern for neurotoxicity. Monitoring very closely.    ALL and BMT: High risk B cell ALL (CNS negative) + JAK2 activation.  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  - Completed apheresis collection in the [facility] (6/2020).   - He has been receiving bridging chemotherapy with oral 6MP, methotrexate and ruxolitinib. All oral chemotherapy stopped on 7/12.   - BM revealed 75% involvement with leukemia. CSF was negative.   - Outpatient lymphodepleting chemotherapy per [id number] with cytoxan and fludarabine 7/21-7/24 completed as an outpatient  - Awaiting count recovery    [facility] infusion 7/27   - Pre-meds of Tylenol and Benadryl   - Flush completed.     BMT Daily CARTOX Note (complete days 0-14 and with any subsequent CRS/neurotoxicity)    Date: August 8, 2020    Artemio Nino ([id number]) is a 20 year old year old male who received infusion on 7/27, currently day +11 of CAR-T cell therapy [facility]    Vital Signs: /56   Pulse 86   Temp 99.1  F (37.3  C) (Axillary)   Resp 20   Wt 59.7 kg (131 lb 9.8 oz)   SpO2 98%   BMI 19.38 kg/m      1) CRS Grading (based ONLY on parameters in box below)    Fever:   > 100.4    Blood pressure:   SBP >/= 90 (not hypotensive)    Oxygen saturation:    >/= 90% on room air (not hypoxic)    CRS Parameter Grade 1  Grade 2 Grade 3 Grade 4   Fever* Tm >= 100.4 degrees F Tm >= 100.4 degrees F Tm >= 100.4 degrees F Tm >= to 100.4  degrees F      With Either:  Hypotension (SBP <90) None Responsive to Fluids  Requiring 1  vasopressor (w/ or w/o vasopressin) Requiring multiple  vasopressors  (excluding  vasopressin)     And/or  Hypoxia (O2 sats <90% on room air) None Low-flow nasal  cannula or blow-by High-flow nasal  cannula, face mask,  non-rebreather mask,or Venturi mask  Requiring positive  pressure (CPAP,  BiPAP intubation and  mechanical  ventilation)     CRS Summary  Does patient have CRS? Yes, date of onset: 7/28/20, initial grade 2  Current CRS grade:  3  What therapy was given:   - 7/28: IV Antibiotics  - 8/6: One 500 mL fluid bolus, dopamine 2 mcg/kg/min, increased dopamine to 7 mcg/kg/min. Gave 1 dose of Tociluzimab. IV fluids 100 mL/hr.  -8/7: 2nd dose of Tociluzimab  -8/8: 3rd dose of Tociluzimab and initiated Methylprednisolone  Has CRS grade changed?  Yes, 8/6: increased from grade 2 to grade 3, he remains a grade 3 today (8/8).   Has CRS resolved? n/a or No    2) Neurotoxicity:  Isael continues to report severe neck pain with associated headache (first noted on 8/6 with significant worsening over the last 48 hours) and vomiting. He has full range of motion in his neck, no nuchal rigidity and denies sensitivity to light. Morphine and Tylenol have been ineffective at controlling neck pain/headache. Head CT performed 8/7 showing no intracranial pathology.     Isael does not appear to have any symptoms of encephalopathy, such as: altered mental status, confusion, acting out of character, nor has he had trouble swallowing or speaking, nor involuntary eye or muscle movements.     ICE Assessment  Orientation to year, month, city, hospital: 4 points, Name 3 objects (e.g., point to clock, pen, button): 3 point, Following commands: (e.g., Show me 2 fingers): 1 point, Write a standard sentence (e.g., The pickett jumped over the log): 1 point and Count backwards from 100 by ten: 1 point  Total points: 10: No impairment    ASTCT ICANS Consensus Grading for Adults  ICE Score   10    Seizure   No seizures    Motor Findings   No motor findings    Elevated ICP/Cerebral Edema NA      Neurotoxicity  Domain Grade 1 Grade 2 Grade 3 Grade 4   ICE score 7-9 3-6 1-2 0   Depressed LOC      Awakens  spontaneously Awakens to  voice  Awakens only to  tactile stimulation Unarousable or  requires vigorous  or repetitive  tactile stimuli to  arouse. Stupor  or coma.   Seizure n/a n/a Any clinical  seizure focal or  generalized that  resolves rapidly  or nonconvulsive  seizures on EEG  that  resolve with  intervention  Life-threatening  prolonged  seizure (>5 min);  or Repetitive  clinical or  electrical  seizures without  return to baseline  in between    Motor Findings      n/a n/a n/a Deep focal motor  weakness such  as hemiparesis  or paraparesis   Elevated  ICP/cerebral  edema  n/a n/a Focal/local  edema on  neuroimaging  Diffuse cerebral  edema on  neuroimaging;  decerebrate or  decorticate  posturing; or  cranial nerve VI  palsy; or  papilledema; or  Cushing's triad     ICANS grade is determined by the most severe event (ICE score, level of consciousness, seizure, motor findings, raised ICP/cerebral edema) not attributable to any other cause; for example, a patient with an ICE score of 3 who has a generalized seizure is classified as grade 3 ICANS.    A patient with an ICE score of 0 may be classified as grade 3 ICANS if awake with global aphasia, but a patient with an ICE score of 0 may be classified as grade 4 ICANS if unarousable.     Depressed level of consciousness should be attributable to no other cause (eg, no sedating medication)    Tremors and myoclonus associated with immune effector cell therapies may be graded according to CTCAE v5.0,but they do not influence ICANS grading.     Intracranial hemorrhage with or without associated edema is not considered a neurotoxicity feature and is  excluded from ICANS grading. It may be graded according to CTCAE v5.0.        Neurotoxicity Summary  Does patient have neurotoxicity? Yes  Current Neurotoxicity score (0-10): 9  What therapy was given: Yes (methylprednisolone)  Has neurotoxicity grade changed? yes  Has neurotoxicity resolved? no    3) Organ CAR-T toxicity:    Cardiac   Requiring vasopressors (Dopamine currently at 5-7 mcg/kg/min)    Respiratory   Cough (8/6-8/8). Worsened overnight, but improved this morning. No associated increased work of breathing, hypoxia or need for supplemental oxygen.    Gastrointestinal   Nausea/vomiting (worsened  "in the past 24 hours)    Hepatic    none    Skin   none     Coagulopathy    Last checked 8/7: Elevated INR (1.39), PTT (43), Fibrinogen (normal range at 367 mg/dL), D-Dimer (17.8 micrograms/mL), Ferritin (8154 ng/mL). Rechecking 8/9.    Other:     4) HLH   Ferritin > 10,000 plus any TWO of the following: NA     * CTCAE grading can be found at   https://ctep.cancer.gov/protocoldevelopment/electronic_applications/docs/CTCAE_v5_Quick Reference_8.5x11.pdf    FEN/Renal:   # Risk for malnutrition: Continues to drink some, not eating.  - Continue nutritional support with TPN/IL   - Monitor nutritional intake     # Risk for electrolyte abnormalities:  - Check daily electrolytes, adding very small amount of potassium in TPN today     # Risk for renal dysfunction/risk for ELIZ secondary to potential tumor lysis with preparatory chemotherapy: GFR 133ml/min  - Daily electrolyte monitoring  - Monitor daily weights     # Risk for TLS: Uric Acid 1.7 today. Phos 1.8. Restart allopurinol if uric acid > 2.5  - Daily TLS labs     Pulmonary:  # Risk for pulmonary insufficiency: No current concerns. Normal chest and sinus CTs during work-up.     # Cough, newly reported lung \"pain\": Last night, Isael reported his \"lungs hurt.\" Much improved this morning. He has experiencing an intermittent cough over the past few days, which was worsened throughout the night, but now improved/resolved this morning. Monitoring closely.      Cardiovascular:    # Hypotension: Continued on Dopamine, titrating as indicated. Increased to 10 mcg/kg/min last night, titrated to 5 mcg/kg/min based on blood pressure  - Goal for SBP >90 and MAP >65    # Aaron-Parkinson-White Syndrome: diagnosed upon syncope in 02/2018. Asymptomatic, no interventions to date. No current concerns.  - EKG 7/15 revealed WPW with normal sinus rhythm.   - Echocardiogram 7/15 showed LVEF of 62%  - Cardiology to decide if they want to assess him.     Heme:   # Pancytopenia secondary to " chemotherapy:  - Transfuse for hemoglobin < 7, platelets < 20,000, platelet parameter increased 8/6 due to mild epistaxis & hematemesis with streaks of blood. No reports in the past 24 hours.    -  Mild hives noted at end of pRBC transfusion on 7/25. Premedicate with Tylenol and Benadryl for pRBC transfusions.   - Recent history of significant hives with platelet transfusions. Was being premedicated with hydrocortisone, tylenol, and benadryl for platelets. However, no hydrocortisone premedication ordered now given CAR-T. If severe reaction then ok to give steroids per Dr. Zavala.   - Isael reported he had a few hives on his leg with recent platelet transfusion , non pruritic, self resolved, did not report to nursing.  - No GCSF per protocol     Infectious Disease:  # Recent Febrile Neutropenia: Admitted for fever. Spiking fevers, thought related to CRS, although fever curve improved in the past 24 hours.  S/p vancomycin.  Continue Cefepime and monitor blood cultures. Blood cultures have remained negative or NGTD.   - Covid negative 7/17 and 7/24     # Risk for infection given immunocompromised status:    Active: See above  Prophylaxis:                                                                      - Viral prophylaxis: HD Valtrex continue through Day +30  - Fungal prophylaxis: Micafungin continue through Day +30. History of photoxic drug eruption with voriconazole, avoid use.   - Bacterial prophylaxis: Cefepime as noted above.  - PCP prophylaxis: Bactrim to begin day +28      GI:   # Nausea: Nausea/vomiting worsened over the past 24 hours.   - Scheduled medications: Schedule Zofran q6H  - PRN medications: Benadryl, Ativan     # Acid reflux:  Protonix daily  - TUMS available PRN     # Recent Transaminitis:  Possibly related to recent chemotherapy. May warrant RUQ US to further assess persistent transamnitis.       Neuro:  # Neck pain/Headache: Severe neck pain with associated headache, worsened yesterday and  overnight with Artemio rating the pain 8-10/10. Not responsive to Morphine or Tylenol. Neck pain initially seemingly musculoskeletal in nature, but severity and progression concerning for Neurotoxicity. Neck with normal range of motion, no nuchal rigidity, no sensitivity to light. Monitoring closely.   - Methylprednisolone given x1 overnight and scheduling 1 mg/kg q12 hours.   - Adjust morphine as indicated, subtle adjustments to avoid oversedation (current dose dose 6 mg q4 hours)  - Consider trial of Tramadol   - See Neurotoxicity above for more detail.     Oral Hygiene:   # Receding gums, risk for infection  - Continue chlorhexidine mouth wash for symptomatic control, pain now improved     Derm:  No current rash. He is no longer using creams.     Fertility:  Sperm collected pre chemotherapy for fertility preservation    Discharge Considerations: Expected lengths of hospitalization for patients with complications from stem cell transplant vary based on the complication(s) and severity(ies). A typical stay is 7-14 days.    The above plan of care was developed by and communicated to me by the Pediatric BMT attending physician, MD Ester Tai MD  Pediatric BMT Hospitalist    Pediatric BMT Inpatient Attending Note:    Artemio was seen and evaluated by me today.       The significant interval history includes: Isael reported more severe neck pain along with headache and worsening nausea/vomiting, concerning for signs of meningismus. Received another dose of tocilizumab early this morning but failed to provide much relief. Methylpred was subsequently  initiated which helped with relief of neck pain and nausea, Artemio was able to get some rest after that. Again reported neck pain this afternoon, now receiving scheduled methylpred 1 mg/kg q12h due to concerns with neurotoxicity. Fever curve is improving, blood pressure stable on dopamine, weaning as needed. He continues to have Grade 3 CRS and  received three doses of tociluzumab so far. He continues to be on room air requiring no supplemental oxygen and continues to maintain good saturations.      I have reviewed the vital signs, medications and lab results.  I assisted in formulating a plan, which was instituted by the BMT team. The total amount of time spent in the admission and care of Artemio Nino today was >80 minutes, at least 50% of which was counseling and coordination of care.     Sid Girard MD    Pediatric Blood and Marrow Transplant   [facility]  Pager: [phone]         Patient Active Problem List   Diagnosis     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)     Aaron-Parkinson-White (WPW) syndrome     Bone marrow transplant candidate     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)     At risk for infection     S/P allogeneic bone marrow transplant (H)     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

## 2020-08-08 NOTE — PLAN OF CARE
([id number]).  Pt able to answer all questions and communicate appropriately with mom and staff. TMAX 100.3 in evening.  Lungs clear but diminished, sating good on RA.  RRs shallow and in the 20s.  Frequent dry non productive cough. Early in the shift BPs 90s/40s, dopamine gtt increased to 7mcg, then later increased to 10mcg after minimal increase in BPs, and back to 7mcg around 0400.  Since, BPs have been stable at 100s/50s-60s.  Isael was very uncomfortable most of the night and barely slept.  Neck/head pain ranged from 8-10/10, PRN morphine given x5, PRN tylenol given x2, and PRN valium given x1, all with minimal relief.  Pt also stated he was having some lung pain while breathing, but said he didn't feel like it was difficult to breathe and no increased WOB was noted.  Additional dose of Tociluzimab given and started on steriods per provider's order and after mom talked to attending on phone.  Emesis x2 during shift and frequent retching.  PRN tums and zofran given x1.  Urinating, but pt also mentioned that he felt like it was harder to urinate but had no pain when trying to do so.  Urine darker in color.  Provider notified for all changes/concerns and discussed concerns/plan with mom frequently.  Hourly rounding completed.  Mom present at bedside.  Continue to monitor closely.

## 2020-08-08 NOTE — PROVIDER NOTIFICATION
Provider notified that pt had two BPs in a row of MAPs slightly under 65 even after going up to 7 mcg on dopamine.  Plan to get another BP at 2300 and reassess.

## 2020-08-09 LAB
ALBUMIN SERPL-MCNC: 2.6 G/DL (ref 3.4–5)
ALP SERPL-CCNC: 48 U/L (ref 40–150)
ALT SERPL W P-5'-P-CCNC: 79 U/L (ref 0–70)
ANION GAP SERPL CALCULATED.3IONS-SCNC: 6 MMOL/L (ref 3–14)
APTT PPP: 39 SEC (ref 22–37)
AST SERPL W P-5'-P-CCNC: 58 U/L (ref 0–45)
BILIRUB SERPL-MCNC: 1.1 MG/DL (ref 0.2–1.3)
BLD PROD TYP BPU: NORMAL
BLD UNIT ID BPU: 0
BLOOD PRODUCT CODE: NORMAL
BPU ID: NORMAL
BUN SERPL-MCNC: 23 MG/DL (ref 7–30)
CA-I BLD-MCNC: 4.6 MG/DL (ref 4.4–5.2)
CALCIUM SERPL-MCNC: 7.8 MG/DL (ref 8.5–10.1)
CHLORIDE SERPL-SCNC: 105 MMOL/L (ref 94–109)
CO2 SERPL-SCNC: 27 MMOL/L (ref 20–32)
CREAT SERPL-MCNC: 0.7 MG/DL (ref 0.66–1.25)
DIFFERENTIAL METHOD BLD: ABNORMAL
ERYTHROCYTE [DISTWIDTH] IN BLOOD BY AUTOMATED COUNT: 13 % (ref 10–15)
FERRITIN SERPL-MCNC: ABNORMAL NG/ML (ref 26–388)
FIBRINOGEN PPP-MCNC: 140 MG/DL (ref 200–420)
GFR SERPL CREATININE-BSD FRML MDRD: >90 ML/MIN/{1.73_M2}
GLUCOSE SERPL-MCNC: 257 MG/DL (ref 70–99)
HCT VFR BLD AUTO: 21 % (ref 40–53)
HGB BLD-MCNC: 7.4 G/DL (ref 13.3–17.7)
INR PPP: 1.4 (ref 0.86–1.14)
MCH RBC QN AUTO: 29.6 PG (ref 26.5–33)
MCHC RBC AUTO-ENTMCNC: 35.2 G/DL (ref 31.5–36.5)
MCV RBC AUTO: 84 FL (ref 78–100)
NUM BPU REQUESTED: 1
NUM BPU REQUESTED: 5
PHOSPHATE SERPL-MCNC: 2.8 MG/DL (ref 2.5–4.5)
PLATELET # BLD AUTO: 15 10E9/L (ref 150–450)
POTASSIUM SERPL-SCNC: 3.8 MMOL/L (ref 3.4–5.3)
PROT SERPL-MCNC: 6.1 G/DL (ref 6.8–8.8)
RBC # BLD AUTO: 2.5 10E12/L (ref 4.4–5.9)
SODIUM SERPL-SCNC: 138 MMOL/L (ref 133–144)
TRANSFUSION STATUS PATIENT QL: NORMAL
TRIGL SERPL-MCNC: 135 MG/DL
URATE SERPL-MCNC: 1.9 MG/DL (ref 3.5–7.2)
WBC # BLD AUTO: 0.1 10E9/L (ref 4–11)

## 2020-08-09 PROCEDURE — 25000125 ZZHC RX 250: Performed by: PEDIATRICS

## 2020-08-09 PROCEDURE — 87040 BLOOD CULTURE FOR BACTERIA: CPT | Performed by: STUDENT IN AN ORGANIZED HEALTH CARE EDUCATION/TRAINING PROGRAM

## 2020-08-09 PROCEDURE — 40000344 ZZHCL STATISTIC THAWING COMPONENT: Performed by: PEDIATRICS

## 2020-08-09 PROCEDURE — 25000132 ZZH RX MED GY IP 250 OP 250 PS 637: Performed by: PEDIATRICS

## 2020-08-09 PROCEDURE — 20600000 ZZH R&B BMT

## 2020-08-09 PROCEDURE — 85027 COMPLETE CBC AUTOMATED: CPT

## 2020-08-09 PROCEDURE — 25000128 H RX IP 250 OP 636: Performed by: STUDENT IN AN ORGANIZED HEALTH CARE EDUCATION/TRAINING PROGRAM

## 2020-08-09 PROCEDURE — P9037 PLATE PHERES LEUKOREDU IRRAD: HCPCS | Performed by: NURSE PRACTITIONER

## 2020-08-09 PROCEDURE — 25000132 ZZH RX MED GY IP 250 OP 250 PS 637: Performed by: STUDENT IN AN ORGANIZED HEALTH CARE EDUCATION/TRAINING PROGRAM

## 2020-08-09 PROCEDURE — 82728 ASSAY OF FERRITIN: CPT | Performed by: STUDENT IN AN ORGANIZED HEALTH CARE EDUCATION/TRAINING PROGRAM

## 2020-08-09 PROCEDURE — 25000128 H RX IP 250 OP 636: Performed by: PEDIATRICS

## 2020-08-09 PROCEDURE — P9012 CRYOPRECIPITATE EACH UNIT: HCPCS | Performed by: PEDIATRICS

## 2020-08-09 PROCEDURE — 84550 ASSAY OF BLOOD/URIC ACID: CPT | Performed by: STUDENT IN AN ORGANIZED HEALTH CARE EDUCATION/TRAINING PROGRAM

## 2020-08-09 PROCEDURE — 80053 COMPREHEN METABOLIC PANEL: CPT | Performed by: STUDENT IN AN ORGANIZED HEALTH CARE EDUCATION/TRAINING PROGRAM

## 2020-08-09 PROCEDURE — 84100 ASSAY OF PHOSPHORUS: CPT | Performed by: STUDENT IN AN ORGANIZED HEALTH CARE EDUCATION/TRAINING PROGRAM

## 2020-08-09 PROCEDURE — 85025 COMPLETE CBC W/AUTO DIFF WBC: CPT | Performed by: STUDENT IN AN ORGANIZED HEALTH CARE EDUCATION/TRAINING PROGRAM

## 2020-08-09 PROCEDURE — 84478 ASSAY OF TRIGLYCERIDES: CPT | Performed by: STUDENT IN AN ORGANIZED HEALTH CARE EDUCATION/TRAINING PROGRAM

## 2020-08-09 PROCEDURE — 85610 PROTHROMBIN TIME: CPT | Performed by: STUDENT IN AN ORGANIZED HEALTH CARE EDUCATION/TRAINING PROGRAM

## 2020-08-09 PROCEDURE — 85730 THROMBOPLASTIN TIME PARTIAL: CPT | Performed by: STUDENT IN AN ORGANIZED HEALTH CARE EDUCATION/TRAINING PROGRAM

## 2020-08-09 PROCEDURE — 87103 BLOOD FUNGUS CULTURE: CPT | Performed by: STUDENT IN AN ORGANIZED HEALTH CARE EDUCATION/TRAINING PROGRAM

## 2020-08-09 PROCEDURE — 85384 FIBRINOGEN ACTIVITY: CPT | Performed by: STUDENT IN AN ORGANIZED HEALTH CARE EDUCATION/TRAINING PROGRAM

## 2020-08-09 PROCEDURE — 25800030 ZZH RX IP 258 OP 636: Performed by: PEDIATRICS

## 2020-08-09 PROCEDURE — 82330 ASSAY OF CALCIUM: CPT | Performed by: NURSE PRACTITIONER

## 2020-08-09 RX ADMIN — ONDANSETRON 4 MG: 2 INJECTION INTRAMUSCULAR; INTRAVENOUS at 13:53

## 2020-08-09 RX ADMIN — DIPHENHYDRAMINE HYDROCHLORIDE 50 MG: 50 INJECTION, SOLUTION INTRAMUSCULAR; INTRAVENOUS at 14:16

## 2020-08-09 RX ADMIN — DOPAMINE HYDROCHLORIDE 3 MCG/KG/MIN: 160 INJECTION, SOLUTION INTRAVENOUS at 11:14

## 2020-08-09 RX ADMIN — PHYTONADIONE: 1 INJECTION, EMULSION INTRAMUSCULAR; INTRAVENOUS; SUBCUTANEOUS at 20:41

## 2020-08-09 RX ADMIN — I.V. FAT EMULSION 250 ML: 20 EMULSION INTRAVENOUS at 20:41

## 2020-08-09 RX ADMIN — MORPHINE SULFATE 6 MG: 2 INJECTION, SOLUTION INTRAMUSCULAR; INTRAVENOUS at 01:15

## 2020-08-09 RX ADMIN — MORPHINE SULFATE 6 MG: 2 INJECTION, SOLUTION INTRAMUSCULAR; INTRAVENOUS at 05:17

## 2020-08-09 RX ADMIN — MORPHINE SULFATE 6 MG: 2 INJECTION, SOLUTION INTRAMUSCULAR; INTRAVENOUS at 22:09

## 2020-08-09 RX ADMIN — METHYLPREDNISOLONE SODIUM SUCCINATE 60 MG: 40 INJECTION, POWDER, LYOPHILIZED, FOR SOLUTION INTRAMUSCULAR; INTRAVENOUS at 02:37

## 2020-08-09 RX ADMIN — CEFEPIME 2000 MG: 2 INJECTION, POWDER, FOR SOLUTION INTRAVENOUS at 08:10

## 2020-08-09 RX ADMIN — VALACYCLOVIR HYDROCHLORIDE 1000 MG: 500 TABLET, FILM COATED ORAL at 08:10

## 2020-08-09 RX ADMIN — VALACYCLOVIR HYDROCHLORIDE 1000 MG: 500 TABLET, FILM COATED ORAL at 20:41

## 2020-08-09 RX ADMIN — METHYLPREDNISOLONE SODIUM SUCCINATE 60 MG: 40 INJECTION, POWDER, LYOPHILIZED, FOR SOLUTION INTRAMUSCULAR; INTRAVENOUS at 14:01

## 2020-08-09 RX ADMIN — MORPHINE SULFATE 6 MG: 2 INJECTION, SOLUTION INTRAMUSCULAR; INTRAVENOUS at 13:15

## 2020-08-09 RX ADMIN — VALACYCLOVIR HYDROCHLORIDE 1000 MG: 500 TABLET, FILM COATED ORAL at 13:15

## 2020-08-09 RX ADMIN — ACETAMINOPHEN 975 MG: 325 TABLET, FILM COATED ORAL at 14:16

## 2020-08-09 RX ADMIN — MELATONIN 5 MG TABLET 5 MG: at 22:08

## 2020-08-09 RX ADMIN — ONDANSETRON 4 MG: 2 INJECTION INTRAMUSCULAR; INTRAVENOUS at 09:10

## 2020-08-09 RX ADMIN — PANTOPRAZOLE SODIUM 40 MG: 20 TABLET, DELAYED RELEASE ORAL at 08:10

## 2020-08-09 RX ADMIN — MICAFUNGIN SODIUM 50 MG: 10 INJECTION, POWDER, LYOPHILIZED, FOR SOLUTION INTRAVENOUS at 17:58

## 2020-08-09 RX ADMIN — MORPHINE SULFATE 6 MG: 2 INJECTION, SOLUTION INTRAMUSCULAR; INTRAVENOUS at 17:52

## 2020-08-09 RX ADMIN — MORPHINE SULFATE 6 MG: 2 INJECTION, SOLUTION INTRAMUSCULAR; INTRAVENOUS at 09:13

## 2020-08-09 RX ADMIN — CEFEPIME 2000 MG: 2 INJECTION, POWDER, FOR SOLUTION INTRAVENOUS at 00:35

## 2020-08-09 RX ADMIN — CEFEPIME 2000 MG: 2 INJECTION, POWDER, FOR SOLUTION INTRAVENOUS at 17:08

## 2020-08-09 RX ADMIN — ONDANSETRON 4 MG: 2 INJECTION INTRAMUSCULAR; INTRAVENOUS at 02:37

## 2020-08-09 RX ADMIN — ONDANSETRON 4 MG: 2 INJECTION INTRAMUSCULAR; INTRAVENOUS at 20:42

## 2020-08-09 NOTE — PLAN OF CARE
"1900 - 0700: Febrile with t-max of 100.9. Cultures drawn.  BP 90s-100s/40s-50s. HR 90s-100s. OVSS. Lungs clear. Complained of neck pain when laying flat or moving. Otherwise, the pain \"isn't too bad\". Received PRN morphine every 4 hours. PRN tylenol and melatonin given before bed. No complaints of nausea. Drank part of a smoothie. Voiding. Urine is dark travis/tea-colored. No stool. Platelets to be replaced on days. Dopamine continues at 3 mcg/kg/min. Hourly rounding completed.    "

## 2020-08-09 NOTE — PLAN OF CARE
AF. Dopa was dc'd around 1100 but BPs decreased to 80/40s, MD notifed and restarted med. Dopa was increased to 5mcg/kg/min at 1700 after MAPs consistently below parameter. BPs stable now 100/60s. OVSS. LSC though diminished. Pain managed with prn morphine (3x). No nausea. Given Cryo and platelets, tolerated well. Quiet but responsive all shift. Mom at bedside periodically. Voiding well. No stool. Not eating. Hourly rounding done.

## 2020-08-09 NOTE — PROGRESS NOTES
Pediatric BMT Daily Progress Note    Interval Events:  Remains febrile. Still complains of pain.  Blood pressures improved.       Review of Systems: Pertinent positives include those mentioned in interval events. A complete review of systems was performed and is otherwise negative.      Medications:  Please see MAR    Physical Exam:  Temp:  [98.1  F (36.7  C)-100.9  F (38.3  C)] 98.6  F (37  C)  Pulse:  [] 99  Resp:  [20-24] 20  BP: ()/(45-69) 95/47  SpO2:  [96 %-100 %] 97 %  I/O last 3 completed shifts:  In: 3194.94 [I.V.:1745.34]  Out: 2000 [Urine:2000]     GEN:Laying in bed, appears very tired with eyes closed, but answers questions appropriately. Mental status intact.   HEENT: Full head of hair, normocephalic, sclerae clear, nares patent, no rhinorrhea, slight intermittent cough present. Mucous membranes moist, no oral lesions appreciated, oropharynx clear.    CARD: Mild tachycardia, normal S1/S2 without murmur. Cap refill < 2 sec.   RESP: Good air movement noted, no wheezing or crackles on auscultation. Limited auscultation in posterior lung fields due to pain.   ABD: Soft, non tender, nondistended. No organomegaly appreciated.   EXTREM: warm and well perfused, pulses 2+ throughout.   SKIN: Pale throughout, no bruising or petechia noted  ACCESS: CVC double lumen (L) and single port (R)    Labs:  Labs reviewed, pertinent findings WBC 0.1, Hgb 7.4, platelets 21K. 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 CD19 positive (dim).      Day +13. Isael completed lymphodepleting chemotherapy prep as an outpatient. He was admitted for fever on 7/24, blood cultures no growth to date.     Continues with Grade 3 CRS with  fevers, hypotension requiring vasopressors, but improving.   Neurotoxicity with neck pain also improving.     ALL and BMT: High risk B cell ALL (CNS negative) + JAK2 activation.  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  - Completed apheresis collection in the [facility] (6/2020).   - He has been receiving bridging chemotherapy with oral 6MP, methotrexate and ruxolitinib. All oral chemotherapy stopped on 7/12.   - BM revealed 75% involvement with leukemia. CSF was negative.   - Outpatient lymphodepleting chemotherapy per [id number] with cytoxan and fludarabine 7/21-7/24 completed as an outpatient  - Awaiting count recovery    [facility] [place] infusion 7/27   - Pre-meds of Tylenol and Benadryl   - Flush completed.     BMT Daily CARTOX Note (complete days 0-14 and with any subsequent CRS/neurotoxicity)    Date: August 8, 2020    Artemio Nino ([id number]) is a 20 year old year old male who received infusion on 7/27, currently day +11 of CAR-T cell therapy Kymriah    Vital Signs: BP 95/47   Pulse 99   Temp 98.6  F (37  C) (Axillary)   Resp 20   Wt 59.6 kg (131 lb 6.3 oz)   SpO2 97%   BMI 19.35 kg/m      1) CRS Grading (based ONLY on parameters in box below)    Fever:   > 100.4    Blood pressure:   SBP >/= 90 (not hypotensive)    Oxygen saturation:    >/= 90% on room air (not hypoxic)    CRS Parameter Grade 1  Grade 2 Grade 3 Grade 4   Fever* Tm >= 100.4 degrees F Tm >= 100.4 degrees F Tm >= 100.4 degrees F Tm >= to 100.4  degrees F      With Either:  Hypotension (SBP <90) None Responsive to Fluids  Requiring 1  vasopressor (w/ or w/o vasopressin) Requiring multiple  vasopressors  (excluding  vasopressin)     And/or  Hypoxia (O2 sats <90% on room air) None Low-flow nasal  cannula or blow-by High-flow nasal  cannula, face mask,  non-rebreather mask,or Venturi mask  Requiring positive  pressure (CPAP,  BiPAP intubation  and  mechanical  ventilation)     CRS Summary  Does patient have CRS? Yes, date of onset: 7/28/20, initial grade 2  Current CRS thgthrthathdtheth:th th4th What therapy was given:   - 7/28: IV Antibiotics  - 8/6: One 500 mL fluid bolus, dopamine 2 mcg/kg/min, increased dopamine to 7 mcg/kg/min. Gave 1 dose of Tociluzimab. IV fluids 100 mL/hr.  -8/7: 2nd dose of Tociluzimab  -8/8: 3rd dose of Tociluzimab and initiated Methylprednisolone  8/9 Continue methylprednisolone  Has CRS grade changed?  Yes, 8/6: increased from grade 2 to grade 3, he remains a grade 3 today (8/9).   Has CRS resolved? n/a or No    2) Neurotoxicity:  Isael continues to report  neck pain with associated headache (first noted on 8/6 with significant worsening over 48 hours and now improving) and vomiting. He has full range of motion in his neck, no nuchal rigidity and denies sensitivity to light. Morphine and Tylenol have been ineffective at controlling neck pain/headache. Head CT performed 8/7 showing no intracranial pathology. Today his neck pain has improved.     Isael does not appear to have any symptoms of encephalopathy, such as: altered mental status, confusion, acting out of character, nor has he had trouble swallowing or speaking, nor involuntary eye or muscle movements.     ICE Assessment  Orientation to year, month, city, hospital: 4 points, Name 3 objects (e.g., point to clock, pen, button): 3 point, Following commands: (e.g., Show me 2 fingers): 1 point, Write a standard sentence (e.g., The pickett jumped over the log): 1 point and Count backwards from 100 by ten: 1 point  Total points: 10: No impairment    ASTCT ICANS Consensus Grading for Adults  ICE Score   10    Seizure   No seizures    Motor Findings   No motor findings    Elevated ICP/Cerebral Edema NA      Neurotoxicity  Domain Grade 1 Grade 2 Grade 3 Grade 4   ICE score 7-9 3-6 1-2 0   Depressed LOC      Awakens  spontaneously Awakens to  voice  Awakens only to  tactile stimulation Unarousable  or  requires vigorous  or repetitive  tactile stimuli to  arouse. Stupor  or coma.   Seizure n/a n/a Any clinical  seizure focal or  generalized that  resolves rapidly  or nonconvulsive  seizures on EEG  that resolve with  intervention  Life-threatening  prolonged  seizure (>5 min);  or Repetitive  clinical or  electrical  seizures without  return to baseline  in between    Motor Findings      n/a n/a n/a Deep focal motor  weakness such  as hemiparesis  or paraparesis   Elevated  ICP/cerebral  edema  n/a n/a Focal/local  edema on  neuroimaging  Diffuse cerebral  edema on  neuroimaging;  decerebrate or  decorticate  posturing; or  cranial nerve VI  palsy; or  papilledema; or  Cushing's triad     ICANS grade is determined by the most severe event (ICE score, level of consciousness, seizure, motor findings, raised ICP/cerebral edema) not attributable to any other cause; for example, a patient with an ICE score of 3 who has a generalized seizure is classified as grade 3 ICANS.    A patient with an ICE score of 0 may be classified as grade 3 ICANS if awake with global aphasia, but a patient with an ICE score of 0 may be classified as grade 4 ICANS if unarousable.     Depressed level of consciousness should be attributable to no other cause (eg, no sedating medication)    Tremors and myoclonus associated with immune effector cell therapies may be graded according to CTCAE v5.0,but they do not influence ICANS grading.     Intracranial hemorrhage with or without associated edema is not considered a neurotoxicity feature and is  excluded from ICANS grading. It may be graded according to CTCAE v5.0.        Neurotoxicity Summary  Does patient have neurotoxicity? Yes  Current Neurotoxicity score (0-10): 9  What therapy was given: Yes (methylprednisolone)  Has neurotoxicity grade changed? yes  Has neurotoxicity resolved? no    3) Organ CAR-T toxicity:    Cardiac   Requiring vasopressors (Dopamine currently at 5-7  "mcg/kg/min)    Respiratory   Cough (8/6-8/8). Worsened overnight, but improved this morning. No associated increased work of breathing, hypoxia or need for supplemental oxygen.    Gastrointestinal   Nausea/vomiting (worsened in the past 24 hours)    Hepatic    none    Skin   none     Coagulopathy    Last checked 8/9: Elevated INR (1.4), PTT (39), Fibrinogen 140 (normal range at 367 mg/dL) Ferritin (11125 ng/mL).    Other:     4) HLH   Ferritin > 10,000 plus any TWO of the following: NA     * CTCAE grading can be found at   https://ctep.cancer.gov/protocoldevelopment/electronic_applications/docs/CTCAE_v5_Quick Reference_8.5x11.pdf    FEN/Renal:   # Risk for malnutrition: Continues to drink some, not eating.  - Continue nutritional support with TPN/IL   - Monitor nutritional intake     # Risk for electrolyte abnormalities:  - Check daily electrolytes     # Risk for renal dysfunction/risk for ELIZ secondary to potential tumor lysis with preparatory chemotherapy: GFR 133ml/min  - Daily electrolyte monitoring  - Monitor daily weights     # Risk for TLS: Uric Acid 1.9.  Phos 2.8. - Daily TLS labs     Pulmonary:  # Risk for pulmonary insufficiency: No current concerns. Normal chest and sinus CTs during work-up.     # Cough,  Isael reported his \"lungs hurt\" a few days ago. No issues today. He has experiencing an intermittent cough over the past few days, which was worsened throughout the night, but now improved/resolved this morning. Monitoring closely.      Cardiovascular:    # Hypotension: Continued on Dopamine, titrating as indicated. Currently at 3 mcg/kg/minute.  Will try to wean.   - Goal for SBP >90 and MAP >65    # Aaron-Parkinson-White Syndrome: diagnosed upon syncope in 02/2018. Asymptomatic, no interventions to date. No current concerns.  - EKG 7/15 revealed WPW with normal sinus rhythm.   - Echocardiogram 7/15 showed LVEF of 62%  - Cardiology to decide if they want to assess him.     Heme:   # Pancytopenia " secondary to chemotherapy:  - Transfuse for hemoglobin < 7, platelets < 20,000, platelet parameter increased 8/6 due to mild epistaxis & hematemesis with streaks of blood. No reports in the past 24 hours.    -  Mild hives noted at end of pRBC transfusion on 7/25. Premedicate with Tylenol and Benadryl for pRBC transfusions.   - Recent history of significant hives with platelet transfusions. Was being premedicated with hydrocortisone, tylenol, and benadryl for platelets. However, no hydrocortisone premedication ordered now given CAR-T. If severe reaction then ok to give steroids per Dr. Zavala.   - Isael reported he had a few hives on his leg with recent platelet transfusion , non pruritic, self resolved, did not report to nursing.  - No GCSF per protocol  -Fibrinogen was low- 140, will get a dose of fibrinogen.      Infectious Disease:  # Recent Febrile Neutropenia: Admitted for fever. Spiking fevers, thought related to CRS, although fever curve improved in the past 24 hours.  S/p vancomycin.  Continue Cefepime and monitor blood cultures. Blood cultures have remained negative or NGTD.   - Covid negative 7/17 and 7/24     # Risk for infection given immunocompromised status:    Active: See above  Prophylaxis:                                                                      - Viral prophylaxis: HD Valtrex continue through Day +30  - Fungal prophylaxis: Micafungin continue through Day +30. History of photoxic drug eruption with voriconazole, avoid use.   - Bacterial prophylaxis: Cefepime as noted above.  - PCP prophylaxis: Bactrim to begin day +28      GI:   # Nausea: Nausea/vomiting worsened over the past 24 hours.   - Scheduled medications: Schedule Zofran q6H  - PRN medications: Benadryl, Ativan     # Acid reflux:  Protonix daily  - TUMS available PRN     # Recent Transaminitis:  Possibly related to recent chemotherapy. May warrant RUQ US to further assess persistent transamnitis.       Neuro:  # Neck  pain/Headache: Severe neck pain with associated headache most likely related to neurotoxicity.    Currently on Methylprednisolone q12 hours.   - Adjust morphine as indicated, subtle adjustments to avoid oversedation (current dose dose 6 mg q4 hours)  - Consider trial of Tramadol   - See Neurotoxicity above for more detail.     Oral Hygiene:   # Receding gums, risk for infection  - Continue chlorhexidine mouth wash for symptomatic control, pain now improved     Derm:  No current rash. He is no longer using creams.     Fertility:  Sperm collected pre chemotherapy for fertility preservation    Discharge Considerations: Expected lengths of hospitalization for patients with complications from stem cell transplant vary based on the complication(s) and severity(ies). A typical stay is 7-14 days.    The above plan of care was developed by and communicated to me by the Pediatric BMT attending physician, MD Viky Tai MD, PhD  Peds BMT [facility]      Pediatric BMT Inpatient Attending Note:    Artemio was seen and evaluated by me today.       The significant interval history includes: Isael reports improvement in neck pain and headache today while on scheduled steroids. His nausea and vomiting has also improved. Receiving 1 mg/kg twice a day of methylprednisolone. Other than above stated neurotoxicity, his Grade 3 CRS seems to be under optimal control- fever curve and hypotension seems to be resolving, weaning dopamine. Continues to be on room air with no respiratory symptoms currently. Continues to up-trending inflammatory markers. Fibrinogen noted to be low today, will receive cryoprecipitate. Discussed the ongoing plan and changes with Pam and his parents during morning rounds today.     I have reviewed the vital signs, medications and lab results.  I assisted in formulating a plan, which was instituted by the BMT team. The total amount of time spent in the admission and care of Artemio CELAYA  Trung today was >50 minutes, at least 50% of which was counseling and coordination of care.     Sid Girard MD    Pediatric Blood and Marrow Transplant   [facility]  Pager: [phone]         Patient Active Problem List   Diagnosis     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)     Aaron-Parkinson-White (WPW) syndrome     Bone marrow transplant candidate     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)     At risk for infection     S/P allogeneic bone marrow transplant (H)     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

## 2020-08-10 LAB
ALBUMIN SERPL-MCNC: 2.8 G/DL (ref 3.4–5)
ALP SERPL-CCNC: 72 U/L (ref 40–150)
ALT SERPL W P-5'-P-CCNC: 202 U/L (ref 0–70)
ANION GAP SERPL CALCULATED.3IONS-SCNC: 3 MMOL/L (ref 3–14)
APTT PPP: 38 SEC (ref 22–37)
APTT PPP: 40 SEC (ref 22–37)
AST SERPL W P-5'-P-CCNC: 160 U/L (ref 0–45)
BILIRUB DIRECT SERPL-MCNC: 1.3 MG/DL (ref 0–0.2)
BILIRUB SERPL-MCNC: 2.1 MG/DL (ref 0.2–1.3)
BLD PROD TYP BPU: NORMAL
BLD UNIT ID BPU: 0
BLOOD PRODUCT CODE: NORMAL
BPU ID: NORMAL
BUN SERPL-MCNC: 22 MG/DL (ref 7–30)
CA-I BLD-MCNC: 4.7 MG/DL (ref 4.4–5.2)
CALCIUM SERPL-MCNC: 7.7 MG/DL (ref 8.5–10.1)
CHLORIDE SERPL-SCNC: 107 MMOL/L (ref 94–109)
CO2 SERPL-SCNC: 29 MMOL/L (ref 20–32)
CREAT SERPL-MCNC: 0.55 MG/DL (ref 0.66–1.25)
CRP SERPL-MCNC: 18.8 MG/L (ref 0–8)
DIFFERENTIAL METHOD BLD: ABNORMAL
ERYTHROCYTE [DISTWIDTH] IN BLOOD BY AUTOMATED COUNT: 13.2 % (ref 10–15)
FERRITIN SERPL-MCNC: ABNORMAL NG/ML (ref 26–388)
FIBRINOGEN PPP-MCNC: 67 MG/DL (ref 200–420)
FIBRINOGEN PPP-MCNC: 77 MG/DL (ref 200–420)
GFR SERPL CREATININE-BSD FRML MDRD: >90 ML/MIN/{1.73_M2}
GLUCOSE SERPL-MCNC: 213 MG/DL (ref 70–99)
HCT VFR BLD AUTO: 20.4 % (ref 40–53)
HGB BLD-MCNC: 7 G/DL (ref 13.3–17.7)
INR PPP: 1.69 (ref 0.86–1.14)
INR PPP: 1.71 (ref 0.86–1.14)
LDH SERPL L TO P-CCNC: 844 U/L (ref 85–227)
MAGNESIUM SERPL-MCNC: 2 MG/DL (ref 1.6–2.3)
MCH RBC QN AUTO: 29.3 PG (ref 26.5–33)
MCHC RBC AUTO-ENTMCNC: 34.3 G/DL (ref 31.5–36.5)
MCV RBC AUTO: 85 FL (ref 78–100)
NUM BPU REQUESTED: 1
NUM BPU REQUESTED: 5
NUM BPU REQUESTED: 5
PHOSPHATE SERPL-MCNC: 2.1 MG/DL (ref 2.5–4.5)
PLATELET # BLD AUTO: 20 10E9/L (ref 150–450)
POTASSIUM SERPL-SCNC: 3.7 MMOL/L (ref 3.4–5.3)
PREALB SERPL IA-MCNC: 12 MG/DL (ref 15–45)
PROT SERPL-MCNC: 6.2 G/DL (ref 6.8–8.8)
RBC # BLD AUTO: 2.39 10E12/L (ref 4.4–5.9)
SODIUM SERPL-SCNC: 139 MMOL/L (ref 133–144)
TRANSFUSION STATUS PATIENT QL: NORMAL
URATE SERPL-MCNC: 1.9 MG/DL (ref 3.5–7.2)
WBC # BLD AUTO: 0.1 10E9/L (ref 4–11)

## 2020-08-10 PROCEDURE — 20600000 ZZH R&B BMT

## 2020-08-10 PROCEDURE — 83735 ASSAY OF MAGNESIUM: CPT | Performed by: STUDENT IN AN ORGANIZED HEALTH CARE EDUCATION/TRAINING PROGRAM

## 2020-08-10 PROCEDURE — 83615 LACTATE (LD) (LDH) ENZYME: CPT | Performed by: STUDENT IN AN ORGANIZED HEALTH CARE EDUCATION/TRAINING PROGRAM

## 2020-08-10 PROCEDURE — P9037 PLATE PHERES LEUKOREDU IRRAD: HCPCS | Performed by: NURSE PRACTITIONER

## 2020-08-10 PROCEDURE — 82330 ASSAY OF CALCIUM: CPT | Performed by: NURSE PRACTITIONER

## 2020-08-10 PROCEDURE — 25000128 H RX IP 250 OP 636: Performed by: PEDIATRICS

## 2020-08-10 PROCEDURE — 25000128 H RX IP 250 OP 636: Performed by: STUDENT IN AN ORGANIZED HEALTH CARE EDUCATION/TRAINING PROGRAM

## 2020-08-10 PROCEDURE — 25000125 ZZHC RX 250: Performed by: PEDIATRICS

## 2020-08-10 PROCEDURE — 85025 COMPLETE CBC W/AUTO DIFF WBC: CPT | Performed by: STUDENT IN AN ORGANIZED HEALTH CARE EDUCATION/TRAINING PROGRAM

## 2020-08-10 PROCEDURE — 40000344 ZZHCL STATISTIC THAWING COMPONENT: Performed by: STUDENT IN AN ORGANIZED HEALTH CARE EDUCATION/TRAINING PROGRAM

## 2020-08-10 PROCEDURE — 25800030 ZZH RX IP 258 OP 636: Performed by: PEDIATRICS

## 2020-08-10 PROCEDURE — P9012 CRYOPRECIPITATE EACH UNIT: HCPCS | Performed by: STUDENT IN AN ORGANIZED HEALTH CARE EDUCATION/TRAINING PROGRAM

## 2020-08-10 PROCEDURE — 40000344 ZZHCL STATISTIC THAWING COMPONENT: Performed by: PEDIATRICS

## 2020-08-10 PROCEDURE — P9012 CRYOPRECIPITATE EACH UNIT: HCPCS | Performed by: PEDIATRICS

## 2020-08-10 PROCEDURE — 80053 COMPREHEN METABOLIC PANEL: CPT | Performed by: STUDENT IN AN ORGANIZED HEALTH CARE EDUCATION/TRAINING PROGRAM

## 2020-08-10 PROCEDURE — 84550 ASSAY OF BLOOD/URIC ACID: CPT | Performed by: STUDENT IN AN ORGANIZED HEALTH CARE EDUCATION/TRAINING PROGRAM

## 2020-08-10 PROCEDURE — 25000132 ZZH RX MED GY IP 250 OP 250 PS 637: Performed by: PEDIATRICS

## 2020-08-10 PROCEDURE — 84134 ASSAY OF PREALBUMIN: CPT | Performed by: STUDENT IN AN ORGANIZED HEALTH CARE EDUCATION/TRAINING PROGRAM

## 2020-08-10 PROCEDURE — 86140 C-REACTIVE PROTEIN: CPT | Performed by: STUDENT IN AN ORGANIZED HEALTH CARE EDUCATION/TRAINING PROGRAM

## 2020-08-10 PROCEDURE — 25800030 ZZH RX IP 258 OP 636: Performed by: NURSE PRACTITIONER

## 2020-08-10 PROCEDURE — 85730 THROMBOPLASTIN TIME PARTIAL: CPT | Performed by: STUDENT IN AN ORGANIZED HEALTH CARE EDUCATION/TRAINING PROGRAM

## 2020-08-10 PROCEDURE — 25000132 ZZH RX MED GY IP 250 OP 250 PS 637: Performed by: STUDENT IN AN ORGANIZED HEALTH CARE EDUCATION/TRAINING PROGRAM

## 2020-08-10 PROCEDURE — P9040 RBC LEUKOREDUCED IRRADIATED: HCPCS | Performed by: STUDENT IN AN ORGANIZED HEALTH CARE EDUCATION/TRAINING PROGRAM

## 2020-08-10 PROCEDURE — 85384 FIBRINOGEN ACTIVITY: CPT | Performed by: STUDENT IN AN ORGANIZED HEALTH CARE EDUCATION/TRAINING PROGRAM

## 2020-08-10 PROCEDURE — 85027 COMPLETE CBC AUTOMATED: CPT

## 2020-08-10 PROCEDURE — 82728 ASSAY OF FERRITIN: CPT | Performed by: PEDIATRICS

## 2020-08-10 PROCEDURE — 82248 BILIRUBIN DIRECT: CPT | Performed by: STUDENT IN AN ORGANIZED HEALTH CARE EDUCATION/TRAINING PROGRAM

## 2020-08-10 PROCEDURE — 84100 ASSAY OF PHOSPHORUS: CPT | Performed by: STUDENT IN AN ORGANIZED HEALTH CARE EDUCATION/TRAINING PROGRAM

## 2020-08-10 PROCEDURE — 85610 PROTHROMBIN TIME: CPT | Performed by: STUDENT IN AN ORGANIZED HEALTH CARE EDUCATION/TRAINING PROGRAM

## 2020-08-10 RX ORDER — PANTOPRAZOLE SODIUM 40 MG/1
40 TABLET, DELAYED RELEASE ORAL
Status: DISCONTINUED | OUTPATIENT
Start: 2020-08-10 | End: 2020-08-14 | Stop reason: HOSPADM

## 2020-08-10 RX ORDER — NALOXONE HYDROCHLORIDE 0.4 MG/ML
.1-.4 INJECTION, SOLUTION INTRAMUSCULAR; INTRAVENOUS; SUBCUTANEOUS
Status: DISCONTINUED | OUTPATIENT
Start: 2020-08-10 | End: 2020-08-10

## 2020-08-10 RX ADMIN — ONDANSETRON 4 MG: 2 INJECTION INTRAMUSCULAR; INTRAVENOUS at 07:29

## 2020-08-10 RX ADMIN — CEFEPIME 2000 MG: 2 INJECTION, POWDER, FOR SOLUTION INTRAVENOUS at 01:03

## 2020-08-10 RX ADMIN — ONDANSETRON 4 MG: 2 INJECTION INTRAMUSCULAR; INTRAVENOUS at 02:55

## 2020-08-10 RX ADMIN — VALACYCLOVIR HYDROCHLORIDE 1000 MG: 500 TABLET, FILM COATED ORAL at 13:41

## 2020-08-10 RX ADMIN — MORPHINE SULFATE 6 MG: 2 INJECTION, SOLUTION INTRAMUSCULAR; INTRAVENOUS at 09:21

## 2020-08-10 RX ADMIN — VALACYCLOVIR HYDROCHLORIDE 1000 MG: 500 TABLET, FILM COATED ORAL at 07:30

## 2020-08-10 RX ADMIN — METHYLPREDNISOLONE SODIUM SUCCINATE 60 MG: 40 INJECTION, POWDER, LYOPHILIZED, FOR SOLUTION INTRAMUSCULAR; INTRAVENOUS at 14:06

## 2020-08-10 RX ADMIN — ACETAMINOPHEN 975 MG: 325 TABLET, FILM COATED ORAL at 05:29

## 2020-08-10 RX ADMIN — PANTOPRAZOLE SODIUM 40 MG: 20 TABLET, DELAYED RELEASE ORAL at 07:30

## 2020-08-10 RX ADMIN — Medication: at 13:56

## 2020-08-10 RX ADMIN — DIPHENHYDRAMINE HYDROCHLORIDE 50 MG: 50 INJECTION, SOLUTION INTRAMUSCULAR; INTRAVENOUS at 05:29

## 2020-08-10 RX ADMIN — VALACYCLOVIR HYDROCHLORIDE 1000 MG: 500 TABLET, FILM COATED ORAL at 20:13

## 2020-08-10 RX ADMIN — PANTOPRAZOLE SODIUM 40 MG: 40 TABLET, DELAYED RELEASE ORAL at 16:45

## 2020-08-10 RX ADMIN — DOPAMINE HYDROCHLORIDE 5 MCG/KG/MIN: 160 INJECTION, SOLUTION INTRAVENOUS at 09:57

## 2020-08-10 RX ADMIN — METHYLPREDNISOLONE SODIUM SUCCINATE 60 MG: 40 INJECTION, POWDER, LYOPHILIZED, FOR SOLUTION INTRAMUSCULAR; INTRAVENOUS at 02:54

## 2020-08-10 RX ADMIN — DIPHENHYDRAMINE HYDROCHLORIDE 50 MG: 50 INJECTION, SOLUTION INTRAMUSCULAR; INTRAVENOUS at 11:25

## 2020-08-10 RX ADMIN — ONDANSETRON 4 MG: 2 INJECTION INTRAMUSCULAR; INTRAVENOUS at 20:12

## 2020-08-10 RX ADMIN — MORPHINE SULFATE 6 MG: 2 INJECTION, SOLUTION INTRAMUSCULAR; INTRAVENOUS at 03:44

## 2020-08-10 RX ADMIN — I.V. FAT EMULSION 250 ML: 20 EMULSION INTRAVENOUS at 20:14

## 2020-08-10 RX ADMIN — SODIUM CHLORIDE: 9 INJECTION, SOLUTION INTRAVENOUS at 17:15

## 2020-08-10 RX ADMIN — ONDANSETRON 4 MG: 2 INJECTION INTRAMUSCULAR; INTRAVENOUS at 13:41

## 2020-08-10 RX ADMIN — CEFEPIME 2000 MG: 2 INJECTION, POWDER, FOR SOLUTION INTRAVENOUS at 07:40

## 2020-08-10 RX ADMIN — CEFEPIME 2000 MG: 2 INJECTION, POWDER, FOR SOLUTION INTRAVENOUS at 16:45

## 2020-08-10 RX ADMIN — ACETAMINOPHEN 975 MG: 325 TABLET, FILM COATED ORAL at 11:25

## 2020-08-10 RX ADMIN — ACETAMINOPHEN 975 MG: 325 TABLET, FILM COATED ORAL at 17:35

## 2020-08-10 RX ADMIN — DIPHENHYDRAMINE HYDROCHLORIDE 50 MG: 50 INJECTION, SOLUTION INTRAMUSCULAR; INTRAVENOUS at 17:35

## 2020-08-10 RX ADMIN — PHYTONADIONE: 1 INJECTION, EMULSION INTRAMUSCULAR; INTRAVENOUS; SUBCUTANEOUS at 20:13

## 2020-08-10 RX ADMIN — MICAFUNGIN SODIUM 50 MG: 10 INJECTION, POWDER, LYOPHILIZED, FOR SOLUTION INTRAVENOUS at 20:20

## 2020-08-10 NOTE — PLAN OF CARE
Afebrile. HR more elevated than last night, generally in the 100s rather than the 90s. BP 100s/50s-60s, with the exception of the 0300 BP of 80s/40s. Patient was lying on back right before BP was taken. OVSS. Lungs clear. Complained of head and neck pain when moving. Morphine give x1 in the evening. Complained of headache rated an 8 at 0300 and received another dose of morphine. Voiding. No stool. Cryoprecipitate initiated with tylenol and benadryl premed. Platelets and RBCs to be given on days. Dopamine remains unchanged. Hourly rounding completed.

## 2020-08-10 NOTE — PROGRESS NOTES
Pediatric BMT Daily Progress Note    Interval Events:    No acute events overnight.  Continues to endorse headache and neck pain requiring PRN morphine as if scheduled.   Improved day over day after starting scheduled steroids. Afebrile last 24 hours.  No new signs or symptoms this morning.  Blood pressures stable, weaning dopamine based on MAPs.  Appropriate UOP.  Questions and concerns addressed at bedside during morning rounds.    Review of Systems: Pertinent positives include those mentioned in interval events. A complete review of systems was performed and is otherwise negative.      Medications:  Please see MAR    Physical Exam:  Temp:  [97.8  F (36.6  C)-98.8  F (37.1  C)] 98.5  F (36.9  C)  Pulse:  [] 97  Heart Rate:  [70] 70  Resp:  [14-20] 16  BP: ()/(40-64) 100/59  SpO2:  [95 %-99 %] 97 %  I/O last 3 completed shifts:  In: 3649.8 [P.O.:500; I.V.:1470.4]  Out: 3200 [Urine:3200]   GEN: Laying in bed, interactive on phone,  answers questions appropriately. Mental status intact.  Mother at bedside.    HEENT: Full head of hair, normocephalic, sclerae clear, nares patent, no rhinorrhea, no noted cough. Mucous membranes moist, no oral lesions appreciated, oropharynx clear.    CARD: Normal rate and rhythm, normal S1/S2 without murmur. Cap refill < 2 sec.   RESP: Good air movement noted, no wheezing or crackles on auscultation.  CTAB.    ABD: Soft, non tender, nondistended. No organomegaly appreciated.   EXTREM: warm and well perfused, pulses 2+ throughout.   SKIN: Pale throughout, no bruising or petechia noted  ACCESS: CVC double lumen (L) and single port (R)    Labs:  Labs reviewed, pertinent findings WBC 0.1, Hgb 7.0, platelets 20. BUN 22, creat 0.55,  Uric acid 1.9, phos 2.1.     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 Relapsed with JAK2 - pre B cell ALL, CNS1 in  June 2020. CD19 positive (dim).      Day +14. Isael completed lymphodepleting chemotherapy prep as an outpatient. He was admitted for fever on 7/24, blood cultures no growth to date.    Continues with Grade 3 CRS with fevers, hypotension requiring vasopressors, and headaches with neck pain but improving day over day after receiving tociluzimab and scheduled methylpred.   Continuing to monitor closely.  Starting on morphine gtt and PCA today given narcotic needs last 24 hours.  Will recheck coags this afternoon to reassess need for FFP vs cryo.      ALL and BMT: High risk B cell ALL (CNS negative) + JAK2 activation.  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  - Completed apheresis collection in the [facility] (6/2020).   - He has been receiving bridging chemotherapy with oral 6MP, methotrexate and ruxolitinib. All oral chemotherapy stopped on 7/12.   - BM revealed 75% involvement with leukemia. CSF was negative.   - Outpatient lymphodepleting chemotherapy per [id number] with cytoxan and fludarabine 7/21-7/24 completed as an outpatient  - Awaiting count recovery    [facility] infusion 7/27   - Pre-meds of Tylenol and Benadryl   - Flush completed.     BMT Daily CARTOX Note (complete days 0-14 and with any subsequent CRS/neurotoxicity)    Date: August 10, 2020    Artemio Nino ([id number]) is a 20 year old year old male who received infusion on 7/27, currently day +11 of CAR-T cell therapy Kym[facility]    Vital Signs: /59   Pulse 97   Temp 98.5  F (36.9  C) (Axillary)   Resp 16   Wt 59.6 kg (131 lb 6.3 oz)   SpO2 97%   BMI 19.35 kg/m      1) CRS Grading (based ONLY on parameters in box below)    Fever:   > 100.4    Blood pressure:   SBP >/= 90 (not hypotensive)    Oxygen saturation:    >/= 90% on room air (not hypoxic)    CRS Parameter Grade 1  Grade 2 Grade 3  Grade 4   Fever* Tm >= 100.4 degrees F Tm >= 100.4 degrees F Tm >= 100.4 degrees F Tm >= to 100.4  degrees F      With Either:  Hypotension (SBP <90) None Responsive to Fluids  Requiring 1  vasopressor (w/ or w/o vasopressin) Requiring multiple  vasopressors  (excluding  vasopressin)     And/or  Hypoxia (O2 sats <90% on room air) None Low-flow nasal  cannula or blow-by High-flow nasal  cannula, face mask,  non-rebreather mask,or Venturi mask  Requiring positive  pressure (CPAP,  BiPAP intubation and  mechanical  ventilation)     CRS Summary  Does patient have CRS? Yes, date of onset: 7/28/20, initial grade 2  Current CRS ndgndrndanddndend:nd nd2nd What therapy was given:   - 7/28: IV Antibiotics  - 8/6: One 500 mL fluid bolus, dopamine 2 mcg/kg/min, increased dopamine to 7 mcg/kg/min. Gave 1 dose of Tociluzimab. IV fluids 100 mL/hr.  -8/7: 2nd dose of Tociluzimab  -8/8: 3rd dose of Tociluzimab and initiated Methylprednisolone  8/9-10 Continue methylprednisolone Q12H  Has CRS grade changed?  Yes, 8/6: increased from grade 2 to grade 3, he remains a grade 3 today (8/10).   Has CRS resolved?  No    2) Neurotoxicity:  Isael continues to report neck pain with associated headache (first noted on 8/6 with significant worsening over 48 hours and now improving) and vomiting. He has full range of motion in his neck, no nuchal rigidity and denies sensitivity to light.  Morphine and Tylenol have been ineffective at controlling neck pain/headache. Head CT performed 8/7 showing no intracranial pathology. Today his neck pain has improved.     Isael does not appear to have any symptoms of encephalopathy, such as: altered mental status, confusion, acting out of character, nor has he had trouble swallowing or speaking, nor involuntary eye or muscle movements.     ICE Assessment  Orientation to year, month, city, hospital: 4 points, Name 3 objects (e.g., point to clock, pen, button): 3 point, Following commands: (e.g., Show me 2 fingers): 1 point,  Write a standard sentence (e.g., The pickett jumped over the log): 1 point and Count backwards from 100 by ten: 1 point  Total points: 10: No impairment    ASTCT ICANS Consensus Grading for Adults  ICE Score   10    Seizure   No seizures    Motor Findings   No motor findings    Elevated ICP/Cerebral Edema NA      Neurotoxicity  Domain Grade 1 Grade 2 Grade 3 Grade 4   ICE score 7-9 3-6 1-2 0   Depressed LOC      Awakens  spontaneously Awakens to  voice  Awakens only to  tactile stimulation Unarousable or  requires vigorous  or repetitive  tactile stimuli to  arouse. Stupor  or coma.   Seizure n/a n/a Any clinical  seizure focal or  generalized that  resolves rapidly  or nonconvulsive  seizures on EEG  that resolve with  intervention  Life-threatening  prolonged  seizure (>5 min);  or Repetitive  clinical or  electrical  seizures without  return to baseline  in between    Motor Findings      n/a n/a n/a Deep focal motor  weakness such  as hemiparesis  or paraparesis   Elevated  ICP/cerebral  edema  n/a n/a Focal/local  edema on  neuroimaging  Diffuse cerebral  edema on  neuroimaging;  decerebrate or  decorticate  posturing; or  cranial nerve VI  palsy; or  papilledema; or  Cushing's triad     ICANS grade is determined by the most severe event (ICE score, level of consciousness, seizure, motor findings, raised ICP/cerebral edema) not attributable to any other cause; for example, a patient with an ICE score of 3 who has a generalized seizure is classified as grade 3 ICANS.    A patient with an ICE score of 0 may be classified as grade 3 ICANS if awake with global aphasia, but a patient with an ICE score of 0 may be classified as grade 4 ICANS if unarousable.     Depressed level of consciousness should be attributable to no other cause (eg, no sedating medication)    Tremors and myoclonus associated with immune effector cell therapies may be graded according to CTCAE v5.0,but they do not influence ICANS grading.   "   Intracranial hemorrhage with or without associated edema is not considered a neurotoxicity feature and is  excluded from ICANS grading. It may be graded according to CTCAE v5.0.        Neurotoxicity Summary  Does patient have neurotoxicity? Yes  Current Neurotoxicity score (0-10): 10  What therapy was given: Yes (methylprednisolone)  Has neurotoxicity grade changed? yes  Has neurotoxicity resolved? no    3) Organ CAR-T toxicity:    Cardiac   Requiring vasopressors (Dopamine currently at 3-5 mcg/kg/min)    Respiratory   Cough (8/6-8/8). Worsened overnight, but not resolved. No associated increased work of breathing, hypoxia or need for supplemental oxygen.    Gastrointestinal   Nausea/vomiting (worsened in the past 24 hours)    Hepatic    none    Skin   none     Coagulopathy    Last checked 8/9: Elevated INR (1.4), PTT (39), Fibrinogen 140 (normal range at 367 mg/dL) Ferritin (12921 ng/mL).    Other:     4) HLH   Ferritin > 10,000 plus any TWO of the following: NA     * CTCAE grading can be found at   https://ctep.cancer.gov/protocoldevelopment/electronic_applications/docs/CTCAE_v5_Quick Reference_8.5x11.pdf    FEN/Renal:   # Risk for malnutrition: Continues to drink some, not eating.  - Continue nutritional support with TPN/IL   - Monitor nutritional intake     # Risk for electrolyte abnormalities:  - Check daily electrolytes     # Risk for renal dysfunction/risk for ELIZ secondary to potential tumor lysis with preparatory chemotherapy: GFR 133ml/min  - Daily electrolyte monitoring  - Monitor daily weights     # Risk for TLS: Uric Acid 1.9.  Phos 2.1.   - Daily TLS labs     Pulmonary:  # Risk for pulmonary insufficiency: No current concerns. Normal chest and sinus CTs during work-up.     # Cough, resolved  Isael reported his \"lungs hurt\" a few days ago. No issues today. He has experiencing an intermittent cough over the past few days, which was worsened throughout the night, but now improved/resolved this morning. "   - Monitoring closely     Cardiovascular:  # Hypotension: Continued on Dopamine, titrating as indicated. Currently at 4 mcg/kg/minute with plans to wean off today as tolerated.  - Goal for SBP >90 and MAP >65    # Aaron-Parkinson-White Syndrome: diagnosed upon syncope in 02/2018. Asymptomatic, no interventions to date. No current concerns.  - EKG 7/15 revealed WPW with normal sinus rhythm.   - Echocardiogram 7/15 showed LVEF of 62%  - Cardiology to decide if they want to assess him.     Heme:   # Pancytopenia secondary to chemotherapy:  - Transfuse for hemoglobin < 7, platelets < 20,000, platelet parameter increased 8/6 due to mild epistaxis & hematemesis with streaks of blood. No reports in the past 24 hours.    -  Mild hives noted at end of pRBC transfusion on 7/25. Premedicate with Tylenol and Benadryl for pRBC transfusions.   - Recent history of significant hives with platelet transfusions. Was being premedicated with hydrocortisone, tylenol, and benadryl for platelets. However, no hydrocortisone premedication ordered now given CAR-T.   - Isael reported he had a few hives on his leg with recent platelet transfusion , non pruritic, self resolved, did not report to nursing.  - No GCSF per protocol  -Fibrinogen was low- 140, will get a dose of fibrinogen.      Infectious Disease:  # Recent Febrile Neutropenia: Admitted for fever. Spiking fevers, thought related to CRS, although fever curve improved in the past 24 hours.  S/p vancomycin.  Continue Cefepime and monitor blood cultures. Blood cultures have remained negative or NGTD.  Afebrile the last 24 hours.  - Covid negative 7/17 and 7/24     # Risk for infection given immunocompromised status:    Active: See above  Prophylaxis:                                                                      - Viral prophylaxis: HD Valtrex continue through Day +30  - Fungal prophylaxis: Micafungin continue through Day +30. History of photoxic drug eruption with voriconazole,  avoid use.   - Bacterial prophylaxis: Cefepime as noted above.  - PCP prophylaxis: Bactrim to begin day +28      GI:   # Nausea, stable after starting scheduled zofran  - Scheduled medications: Schedule Zofran q6H  - PRN medications: Benadryl, Ativan     # Acid reflux:    - Protonix increased to BID  - TUMS available PRN     # Recent Transaminitis:  Possibly related to recent chemotherapy. May warrant RUQ US to further assess persistent transamnitis.       Neuro:  # Neck pain/Headache: Severe neck pain with associated headache most likely related to neurotoxicity.    Currently on Methylprednisolone q12 hours.   - transitioned to morphine gtt with PCA, monitoring for side effects  - See Neurotoxicity above for more detail     Oral Hygiene:   # Receding gums, risk for infection  - Continue chlorhexidine mouth wash for symptomatic control, pain now improved     Derm:  No current rash. He is no longer using creams.     Fertility:  Sperm collected pre chemotherapy for fertility preservation    Discharge Considerations: Expected lengths of hospitalization for patients with complications from stem cell transplant vary based on the complication(s) and severity(ies). A typical stay is 7-14 days.    The above plan of care was developed by and communicated to me by the Pediatric BMT attending physician, MD John Tai MD  Peds BMT Hospitalist    Pediatric BMT Inpatient Attending Note:    Artemio was seen and evaluated by me today.       The significant interval history includes: Isael reports improvement in neck pain and headache while on scheduled steroids. His nausea and vomiting have resolved now. Receiving 1 mg/kg twice a day of methylprednisolone. Other than above stated neurotoxicity, his Grade 3 CRS seems to be under optimal control- fever curve and hypotension seems to be resolving. Did not tolerate dopamine wean yesterday, will try again today. Continues to be on room air with no respiratory  symptoms currently. Continues to up-trending inflammatory markers. Fibrinogen noted to be low again today, will receive cryoprecipitate/FFP. Discussed the ongoing plan and changes with Pam and his mother during morning rounds today.     I have reviewed the vital signs, medications and lab results.  I assisted in formulating a plan, which was instituted by the BMT team. The total amount of time spent in the admission and care of Artemio Nino today was >40 minutes, at least 50% of which was counseling and coordination of care.     Sid Girard MD    Pediatric Blood and Marrow Transplant   [facility]  Pager: [phone]         Patient Active Problem List   Diagnosis     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)     Aaron-Parkinson-White (WPW) syndrome     Bone marrow transplant candidate     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)     At risk for infection     S/P allogeneic bone marrow transplant (H)     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

## 2020-08-10 NOTE — PLAN OF CARE
AF. Bp stable with MAPs above 65 as we weaned dopamine from 5 mcg/kg/min to 2 mcg/kg/min. OVSS. LSC. Started on morphine PCA, denied pain. No nausea. Platelets given x1. Two units of RBCs given. Cryo given x1. Voiding well. No stool. In bed most of the day. Mom at bedside. Hourly rounding done.

## 2020-08-11 ENCOUNTER — APPOINTMENT (OUTPATIENT)
Dept: ULTRASOUND IMAGING | Facility: CLINIC | Age: 20
End: 2020-08-11
Attending: STUDENT IN AN ORGANIZED HEALTH CARE EDUCATION/TRAINING PROGRAM
Payer: COMMERCIAL

## 2020-08-11 LAB
ABO + RH BLD: NORMAL
ALBUMIN SERPL-MCNC: 2.8 G/DL (ref 3.4–5)
ALP SERPL-CCNC: 85 U/L (ref 40–150)
ALT SERPL W P-5'-P-CCNC: 292 U/L (ref 0–70)
ANION GAP SERPL CALCULATED.3IONS-SCNC: 4 MMOL/L (ref 3–14)
APTT PPP: 35 SEC (ref 22–37)
AST SERPL W P-5'-P-CCNC: 228 U/L (ref 0–45)
BACTERIA SPEC CULT: NO GROWTH
BACTERIA SPEC CULT: NO GROWTH
BILIRUB DIRECT SERPL-MCNC: 1.8 MG/DL (ref 0–0.2)
BILIRUB SERPL-MCNC: 3.1 MG/DL (ref 0.2–1.3)
BLD GP AB SCN SERPL QL: NORMAL
BLD GP AB SCN SERPL QL: NORMAL
BLD PROD TYP BPU: NORMAL
BLD UNIT ID BPU: 0
BLD UNIT ID BPU: 0
BLOOD BANK CMNT PATIENT-IMP: NORMAL
BLOOD BANK CMNT PATIENT-IMP: NORMAL
BLOOD PRODUCT CODE: NORMAL
BLOOD PRODUCT CODE: NORMAL
BPU ID: NORMAL
BPU ID: NORMAL
BUN SERPL-MCNC: 19 MG/DL (ref 7–30)
CA-I BLD-MCNC: 4.7 MG/DL (ref 4.4–5.2)
CALCIUM SERPL-MCNC: 7.9 MG/DL (ref 8.5–10.1)
CHLORIDE SERPL-SCNC: 107 MMOL/L (ref 94–109)
CO2 SERPL-SCNC: 29 MMOL/L (ref 20–32)
CREAT SERPL-MCNC: 0.5 MG/DL (ref 0.66–1.25)
DIFFERENTIAL METHOD BLD: ABNORMAL
ERYTHROCYTE [DISTWIDTH] IN BLOOD BY AUTOMATED COUNT: 13.9 % (ref 10–15)
FIBRINOGEN PPP-MCNC: 66 MG/DL (ref 200–420)
FIBRINOGEN PPP-MCNC: 82 MG/DL (ref 200–420)
GFR SERPL CREATININE-BSD FRML MDRD: >90 ML/MIN/{1.73_M2}
GGT SERPL-CCNC: 598 U/L (ref 0–75)
GLUCOSE SERPL-MCNC: 245 MG/DL (ref 70–99)
HCT VFR BLD AUTO: 22.2 % (ref 40–53)
HGB BLD-MCNC: 8 G/DL (ref 13.3–17.7)
INR PPP: 1.59 (ref 0.86–1.14)
Lab: NORMAL
MCH RBC QN AUTO: 29.6 PG (ref 26.5–33)
MCHC RBC AUTO-ENTMCNC: 36 G/DL (ref 31.5–36.5)
MCV RBC AUTO: 82 FL (ref 78–100)
NUM BPU REQUESTED: 10
NUM BPU REQUESTED: 2
PHOSPHATE SERPL-MCNC: 3.1 MG/DL (ref 2.5–4.5)
PLATELET # BLD AUTO: 21 10E9/L (ref 150–450)
POTASSIUM SERPL-SCNC: 3.9 MMOL/L (ref 3.4–5.3)
PROT SERPL-MCNC: 6.3 G/DL (ref 6.8–8.8)
RBC # BLD AUTO: 2.7 10E12/L (ref 4.4–5.9)
SODIUM SERPL-SCNC: 140 MMOL/L (ref 133–144)
SPECIMEN EXP DATE BLD: NORMAL
SPECIMEN EXP DATE BLD: NORMAL
SPECIMEN SOURCE: NORMAL
TRANSFUSION STATUS PATIENT QL: NORMAL
URATE SERPL-MCNC: 1.9 MG/DL (ref 3.5–7.2)
WBC # BLD AUTO: 0.1 10E9/L (ref 4–11)
YEAST SPEC QL CULT: NO GROWTH
YEAST SPEC QL CULT: NO GROWTH

## 2020-08-11 PROCEDURE — 85384 FIBRINOGEN ACTIVITY: CPT | Performed by: STUDENT IN AN ORGANIZED HEALTH CARE EDUCATION/TRAINING PROGRAM

## 2020-08-11 PROCEDURE — 85730 THROMBOPLASTIN TIME PARTIAL: CPT | Performed by: STUDENT IN AN ORGANIZED HEALTH CARE EDUCATION/TRAINING PROGRAM

## 2020-08-11 PROCEDURE — 25000132 ZZH RX MED GY IP 250 OP 250 PS 637: Performed by: STUDENT IN AN ORGANIZED HEALTH CARE EDUCATION/TRAINING PROGRAM

## 2020-08-11 PROCEDURE — 86900 BLOOD TYPING SEROLOGIC ABO: CPT | Performed by: STUDENT IN AN ORGANIZED HEALTH CARE EDUCATION/TRAINING PROGRAM

## 2020-08-11 PROCEDURE — 84100 ASSAY OF PHOSPHORUS: CPT | Performed by: STUDENT IN AN ORGANIZED HEALTH CARE EDUCATION/TRAINING PROGRAM

## 2020-08-11 PROCEDURE — 80053 COMPREHEN METABOLIC PANEL: CPT | Performed by: STUDENT IN AN ORGANIZED HEALTH CARE EDUCATION/TRAINING PROGRAM

## 2020-08-11 PROCEDURE — 84550 ASSAY OF BLOOD/URIC ACID: CPT | Performed by: STUDENT IN AN ORGANIZED HEALTH CARE EDUCATION/TRAINING PROGRAM

## 2020-08-11 PROCEDURE — 82330 ASSAY OF CALCIUM: CPT | Performed by: NURSE PRACTITIONER

## 2020-08-11 PROCEDURE — 20600000 ZZH R&B BMT

## 2020-08-11 PROCEDURE — 25000125 ZZHC RX 250: Performed by: PEDIATRICS

## 2020-08-11 PROCEDURE — 25800030 ZZH RX IP 258 OP 636: Performed by: PEDIATRICS

## 2020-08-11 PROCEDURE — 25800030 ZZH RX IP 258 OP 636: Performed by: NURSE PRACTITIONER

## 2020-08-11 PROCEDURE — 85027 COMPLETE CBC AUTOMATED: CPT

## 2020-08-11 PROCEDURE — 25000128 H RX IP 250 OP 636: Performed by: PEDIATRICS

## 2020-08-11 PROCEDURE — 25000132 ZZH RX MED GY IP 250 OP 250 PS 637: Performed by: PEDIATRICS

## 2020-08-11 PROCEDURE — 25000128 H RX IP 250 OP 636: Performed by: STUDENT IN AN ORGANIZED HEALTH CARE EDUCATION/TRAINING PROGRAM

## 2020-08-11 PROCEDURE — 85610 PROTHROMBIN TIME: CPT | Performed by: STUDENT IN AN ORGANIZED HEALTH CARE EDUCATION/TRAINING PROGRAM

## 2020-08-11 PROCEDURE — P9012 CRYOPRECIPITATE EACH UNIT: HCPCS | Performed by: STUDENT IN AN ORGANIZED HEALTH CARE EDUCATION/TRAINING PROGRAM

## 2020-08-11 PROCEDURE — 76700 US EXAM ABDOM COMPLETE: CPT

## 2020-08-11 PROCEDURE — 86850 RBC ANTIBODY SCREEN: CPT | Performed by: STUDENT IN AN ORGANIZED HEALTH CARE EDUCATION/TRAINING PROGRAM

## 2020-08-11 PROCEDURE — 82248 BILIRUBIN DIRECT: CPT | Performed by: STUDENT IN AN ORGANIZED HEALTH CARE EDUCATION/TRAINING PROGRAM

## 2020-08-11 PROCEDURE — 82977 ASSAY OF GGT: CPT | Performed by: STUDENT IN AN ORGANIZED HEALTH CARE EDUCATION/TRAINING PROGRAM

## 2020-08-11 PROCEDURE — 40000344 ZZHCL STATISTIC THAWING COMPONENT: Performed by: STUDENT IN AN ORGANIZED HEALTH CARE EDUCATION/TRAINING PROGRAM

## 2020-08-11 PROCEDURE — 86901 BLOOD TYPING SEROLOGIC RH(D): CPT | Performed by: STUDENT IN AN ORGANIZED HEALTH CARE EDUCATION/TRAINING PROGRAM

## 2020-08-11 RX ORDER — URSODIOL 300 MG/1
600 CAPSULE ORAL 3 TIMES DAILY
Status: DISCONTINUED | OUTPATIENT
Start: 2020-08-11 | End: 2020-08-14 | Stop reason: HOSPADM

## 2020-08-11 RX ORDER — FUROSEMIDE 10 MG/ML
10 INJECTION INTRAMUSCULAR; INTRAVENOUS ONCE
Status: COMPLETED | OUTPATIENT
Start: 2020-08-11 | End: 2020-08-11

## 2020-08-11 RX ADMIN — PANTOPRAZOLE SODIUM 40 MG: 40 TABLET, DELAYED RELEASE ORAL at 08:01

## 2020-08-11 RX ADMIN — URSODIOL 600 MG: 300 CAPSULE ORAL at 19:57

## 2020-08-11 RX ADMIN — ONDANSETRON 4 MG: 2 INJECTION INTRAMUSCULAR; INTRAVENOUS at 02:03

## 2020-08-11 RX ADMIN — DIPHENHYDRAMINE HYDROCHLORIDE 50 MG: 50 INJECTION, SOLUTION INTRAMUSCULAR; INTRAVENOUS at 08:37

## 2020-08-11 RX ADMIN — CEFEPIME 2000 MG: 2 INJECTION, POWDER, FOR SOLUTION INTRAVENOUS at 00:09

## 2020-08-11 RX ADMIN — ONDANSETRON 4 MG: 2 INJECTION INTRAMUSCULAR; INTRAVENOUS at 19:43

## 2020-08-11 RX ADMIN — VALACYCLOVIR HYDROCHLORIDE 1000 MG: 500 TABLET, FILM COATED ORAL at 08:01

## 2020-08-11 RX ADMIN — PANTOPRAZOLE SODIUM 40 MG: 40 TABLET, DELAYED RELEASE ORAL at 16:19

## 2020-08-11 RX ADMIN — VALACYCLOVIR HYDROCHLORIDE 1000 MG: 500 TABLET, FILM COATED ORAL at 19:58

## 2020-08-11 RX ADMIN — VALACYCLOVIR HYDROCHLORIDE 1000 MG: 500 TABLET, FILM COATED ORAL at 14:12

## 2020-08-11 RX ADMIN — CEFEPIME 2000 MG: 2 INJECTION, POWDER, FOR SOLUTION INTRAVENOUS at 16:18

## 2020-08-11 RX ADMIN — MICAFUNGIN SODIUM 50 MG: 10 INJECTION, POWDER, LYOPHILIZED, FOR SOLUTION INTRAVENOUS at 19:43

## 2020-08-11 RX ADMIN — METHYLPREDNISOLONE SODIUM SUCCINATE 60 MG: 40 INJECTION, POWDER, LYOPHILIZED, FOR SOLUTION INTRAMUSCULAR; INTRAVENOUS at 03:05

## 2020-08-11 RX ADMIN — ONDANSETRON 4 MG: 2 INJECTION INTRAMUSCULAR; INTRAVENOUS at 08:01

## 2020-08-11 RX ADMIN — PHYTONADIONE: 1 INJECTION, EMULSION INTRAMUSCULAR; INTRAVENOUS; SUBCUTANEOUS at 19:45

## 2020-08-11 RX ADMIN — SMOFLIPID 250 ML: 6; 6; 5; 3 INJECTION, EMULSION INTRAVENOUS at 20:24

## 2020-08-11 RX ADMIN — URSODIOL 600 MG: 300 CAPSULE ORAL at 11:13

## 2020-08-11 RX ADMIN — URSODIOL 600 MG: 300 CAPSULE ORAL at 14:12

## 2020-08-11 RX ADMIN — METHYLPREDNISOLONE SODIUM SUCCINATE 60 MG: 40 INJECTION, POWDER, LYOPHILIZED, FOR SOLUTION INTRAMUSCULAR; INTRAVENOUS at 14:12

## 2020-08-11 RX ADMIN — SODIUM CHLORIDE: 9 INJECTION, SOLUTION INTRAVENOUS at 00:09

## 2020-08-11 RX ADMIN — ONDANSETRON 4 MG: 2 INJECTION INTRAMUSCULAR; INTRAVENOUS at 14:12

## 2020-08-11 RX ADMIN — FUROSEMIDE 10 MG: 10 INJECTION, SOLUTION INTRAMUSCULAR; INTRAVENOUS at 11:13

## 2020-08-11 RX ADMIN — CEFEPIME 2000 MG: 2 INJECTION, POWDER, FOR SOLUTION INTRAVENOUS at 08:01

## 2020-08-11 RX ADMIN — ACETAMINOPHEN 975 MG: 325 TABLET, FILM COATED ORAL at 08:37

## 2020-08-11 NOTE — PLAN OF CARE
[[id number]] Afebrile. LSC on RA. HR 58-80s. BP stable, all MAP above 65. Dopamine gtt unchanged at 2 mcg. No n/v. Denies pain. No bumps from morphine gtt. Morphine gtt unchanged. Good UOP, no stool. Will continue with q1hr. BP checks. Hourly rounding complete. Continue with POC and will notify doctors of changes.

## 2020-08-11 NOTE — PROGRESS NOTES
Pt has been afebrile (Tmax 100.3), HR 70s-80s today, BP have been 90s-100s/50s-60s. Dopamine gtt to 1mcg/kg this am and after pt remained stable, turned off around 0930. MAPs have remained in the mid to high 60s today. Lung sounds are clear. Sating well on RA. Adequate UOP. Cryo x1 with premedications; tolerated very well. Pain is well controlled with Morphine PCA. No further issues.

## 2020-08-11 NOTE — PROGRESS NOTES
Pediatric BMT Daily Progress Note    Interval Events:    No acute events overnight.  Resolves neck pain and headache, now x3 days of steroids.  Afebrile last 24 hours.  No new signs or symptoms this morning.  Blood pressures stable, weaning dopamine based on MAPs.   Received cryo on 8/10 without incident, morning coags pending.   Ambulated yesterday however quickly tired per mother.  Appropriate UOP.  Questions and concerns addressed at bedside during morning rounds.    Review of Systems: Pertinent positives include those mentioned in interval events. A complete review of systems was performed and is otherwise negative.      Medications:  Please see MAR    Physical Exam:  Temp:  [96.9  F (36.1  C)-99.8  F (37.7  C)] 98.7  F (37.1  C)  Pulse:  [58-92] 76  Resp:  [14-18] 18  BP: ()/(54-67) 104/62  SpO2:  [96 %-100 %] 97 %  I/O last 3 completed shifts:  In: 4097.9 [I.V.:1331.5]  Out: 3340 [Urine:3340]   GEN: Laying in bed, interactive, answers questions appropriately. Mental status intact.  Flat affect.  Mother at bedside.    HEENT: Full head of hair, normocephalic, sclerae clear, nares patent, no rhinorrhea, no noted cough. Mucous membranes moist, no oral lesions appreciated, oropharynx clear.    CARD: Normal rate and rhythm, normal S1/S2 without murmur. Cap refill < 2 sec.   RESP: Good air movement noted, no wheezing or crackles on auscultation.  CTAB.    ABD: Soft, non tender, nondistended. No organomegaly appreciated.   EXTREM: warm and well perfused, pulses 2+ throughout.   SKIN: Pale throughout, no bruising or petechia noted  ACCESS: CVC double lumen (L) and single port (R)    Labs:  Labs reviewed, pertinent findings WBC 0.1, Hgb 8.0, platelets 21. BUN 19, creat 0.5,  Uric acid 1.9, phos 3.1.     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 Relapsed with JAK2 - pre B cell ALL,  CNS1 in June 2020. CD19 positive (dim).      Day +15.  Isael completed lymphodepleting chemotherapy prep as an outpatient. He was admitted for fever on 7/24, blood cultures no growth to date.    Continues with Grade 1/2 CRS no without fevers, off vasopressors, and continued on methylprednisolone.   Continuing to monitor closely.  Repeat cryo 10 units today with coags in the afternoon.  Obtain RUQ U/S with dopplers, start ursodiol 600 mg TID, and transition to SMOF lipids due to direct hyperbilirubinemia with transaminitis in the 200s.      ALL and BMT: High risk B cell ALL (CNS negative) + JAK2 activation.  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  - Completed apheresis collection in the [facility] (6/2020).   - He has been receiving bridging chemotherapy with oral 6MP, methotrexate and ruxolitinib. All oral chemotherapy stopped on 7/12.   - BM revealed 75% involvement with leukemia. CSF was negative.   - Outpatient lymphodepleting chemotherapy per [id number] with cytoxan and fludarabine 7/21-7/24 completed as an outpatient  - Awaiting count recovery    [facility] infusion 7/27   - Pre-meds of Tylenol and Benadryl   - Flush completed     BMT Daily CARTOX Note (complete days 0-14 and with any subsequent CRS/neurotoxicity)    Date: August 11, 2020    Artemio Nino ([id number]) is a 20 year old year old male who received infusion on 7/27, currently day +11 of CAR-T cell therapy Kym[facility]    Vital Signs: /62   Pulse 76   Temp 98.7  F (37.1  C) (Axillary)   Resp 18   Wt 60.4 kg (133 lb 2.5 oz)   SpO2 97%   BMI 19.61 kg/m      1) CRS Grading (based ONLY on parameters in box below)    Fever:   > 100.4    Blood pressure:   SBP >/= 90 (not hypotensive)    Oxygen saturation:    >/= 90% on room air (not hypoxic)    CRS Parameter Grade 1  Grade 2 Grade 3 Grade 4    Fever* Tm >= 100.4 degrees F Tm >= 100.4 degrees F Tm >= 100.4 degrees F Tm >= to 100.4  degrees F      With Either:  Hypotension (SBP <90) None Responsive to Fluids  Requiring 1  vasopressor (w/ or w/o vasopressin) Requiring multiple  vasopressors  (excluding  vasopressin)     And/or  Hypoxia (O2 sats <90% on room air) None Low-flow nasal  cannula or blow-by High-flow nasal  cannula, face mask,  non-rebreather mask,or Venturi mask  Requiring positive  pressure (CPAP,  BiPAP intubation and  mechanical  ventilation)     CRS Summary  Does patient have CRS? Yes, date of onset: 7/28/20, initial grade 2  Current CRS grade: 1/2  What therapy was given:   - 7/28: IV Antibiotics  - 8/6: One 500 mL fluid bolus, dopamine 2 mcg/kg/min, increased dopamine to 7 mcg/kg/min. Gave 1 dose of Tociluzimab. IV fluids 100 mL/hr.  -8/7: 2nd dose of Tociluzimab  -8/8: 3rd dose of Tociluzimab and initiated Methylprednisolone  8/9-10 Continue methylprednisolone Q12H  - 8/11: plan to wean methylpred to 0.5 mg/kg Q12h pending symptoms and vasopressor needs  Has CRS grade changed?  Yes, 8/6: increased from grade 2 to grade 3, he is currently grade 1/2.    Has CRS resolved?  No    2) Neurotoxicity:  Isael continues to report neck pain with associated headache (first noted on 8/6 with significant worsening over 48 hours and now improving) and vomiting. He has full range of motion in his neck, no nuchal rigidity and denies sensitivity to light.  Morphine and Tylenol have been ineffective at controlling neck pain/headache. Head CT performed 8/7 showing no intracranial pathology.  The neck pain continues to resolve day over day now 3-4/10 without any radiation or additional neurological symptoms.     Isael does not appear to have any symptoms of encephalopathy, such as: altered mental status, confusion, acting out of character, nor has he had trouble swallowing or speaking, nor involuntary eye or muscle movements.     ICE Assessment  Orientation  to year, month, city, hospital: 4 points, Name 3 objects (e.g., point to clock, pen, button): 3 point, Following commands: (e.g., Show me 2 fingers): 1 point, Write a standard sentence (e.g., The pickett jumped over the log): 1 point and Count backwards from 100 by ten: 1 point  Total points: 10: No impairment    ASTCT ICANS Consensus Grading for Adults  ICE Score   10    Seizure   No seizures    Motor Findings   No motor findings    Elevated ICP/Cerebral Edema NA      Neurotoxicity  Domain Grade 1 Grade 2 Grade 3 Grade 4   ICE score 7-9 3-6 1-2 0   Depressed LOC      Awakens  spontaneously Awakens to  voice  Awakens only to  tactile stimulation Unarousable or  requires vigorous  or repetitive  tactile stimuli to  arouse. Stupor  or coma.   Seizure n/a n/a Any clinical  seizure focal or  generalized that  resolves rapidly  or nonconvulsive  seizures on EEG  that resolve with  intervention  Life-threatening  prolonged  seizure (>5 min);  or Repetitive  clinical or  electrical  seizures without  return to baseline  in between    Motor Findings      n/a n/a n/a Deep focal motor  weakness such  as hemiparesis  or paraparesis   Elevated  ICP/cerebral  edema  n/a n/a Focal/local  edema on  neuroimaging  Diffuse cerebral  edema on  neuroimaging;  decerebrate or  decorticate  posturing; or  cranial nerve VI  palsy; or  papilledema; or  Cushing's triad     ICANS grade is determined by the most severe event (ICE score, level of consciousness, seizure, motor findings, raised ICP/cerebral edema) not attributable to any other cause; for example, a patient with an ICE score of 3 who has a generalized seizure is classified as grade 3 ICANS.    A patient with an ICE score of 0 may be classified as grade 3 ICANS if awake with global aphasia, but a patient with an ICE score of 0 may be classified as grade 4 ICANS if unarousable.     Depressed level of consciousness should be attributable to no other cause (eg, no sedating medication)  "   Tremors and myoclonus associated with immune effector cell therapies may be graded according to CTCAE v5.0,but they do not influence ICANS grading.     Intracranial hemorrhage with or without associated edema is not considered a neurotoxicity feature and is  excluded from ICANS grading. It may be graded according to CTCAE v5.0.        Neurotoxicity Summary  Does patient have neurotoxicity? Yes  Current Neurotoxicity score (0-10): 10  What therapy was given: Yes, methylprednisolone  Has neurotoxicity grade changed? no  Has neurotoxicity resolved? no    3) Organ CAR-T toxicity:    Cardiac   No current vasopressor needs    Respiratory   Cough (8/6-8/8). No associated increased work of breathing, hypoxia or need for supplemental oxygen.  Now resolved.     Gastrointestinal   Nausea/vomiting, stable    Hepatic    none    Skin   none     Coagulopathy    Last checked 8/11: INR1.59, PRR 35, fibrinogen 66.    Other:     4) HLH   Ferritin > 10,000 plus any TWO of the following: NA     * CTCAE grading can be found at   https://ctep.cancer.gov/protocoldevelopment/electronic_applications/docs/CTCAE_v5_Quick Reference_8.5x11.pdf    FEN/Renal:   # Risk for malnutrition: Continues to drink some, not eating.  - Continue nutritional support with TPN; transition to SMOF lipids today   - Monitor nutritional intake     # Risk for electrolyte abnormalities:  - Check daily electrolytes     # Risk for renal dysfunction/risk for ELIZ secondary to potential tumor lysis with preparatory chemotherapy: GFR 133ml/min  - Daily electrolyte monitoring  - PRN lasix 10 mg IV today given increased IV fluids related to cryoprecipitate   - Monitor daily weights     # Risk for TLS: Uric Acid 1.9.  Phos 3.1  - Daily TLS labs; plan to discontinue on 8/12 if stable.       Pulmonary:  # Risk for pulmonary insufficiency: No current concerns. Normal chest and sinus CTs during work-up.     # Cough, resolved  Isael reported his \"lungs hurt\" a few days ago. No " issues today. He has experiencing an intermittent cough over the past few days, which was worsened throughout the night, but now improved/resolved this morning.   - Monitoring closely     Cardiovascular:  # Hypotension, resolving  Continued on Dopamine, titrating as indicated. Currently at 4 mcg/kg/minute with plans to wean off today as tolerated.  - titrating off today and will continue to monitor given history of low range of normal BPs    # Aaron-Parkinson-White Syndrome: diagnosed upon syncope in 02/2018. Asymptomatic, no interventions to date. No current concerns.  - EKG 7/15 revealed WPW with normal sinus rhythm.   - Echocardiogram 7/15 showed LVEF of 62%  - Cardiology to decide if they want to assess him.     Heme:   # Pancytopenia secondary to chemotherapy:  - Transfuse for hemoglobin < 7, platelets < 20,000, platelet parameter increased 8/6 due to mild epistaxis & hematemesis with streaks of blood. No reports in the past 24 hours.    -  Mild hives noted at end of pRBC transfusion on 7/25. Premedicate with Tylenol and Benadryl for pRBC transfusions.   - Recent history of significant hives with platelet transfusions. Was being premedicated with hydrocortisone, tylenol, and benadryl for platelets. However, no hydrocortisone premedication ordered now given CAR-T.   - Isael reported he had a few hives on his leg with recent platelet transfusion , non pruritic, self resolved, did not report to nursing.  - No GCSF per protocol    #Coagulopathy related to CRS  No concern for DIC currently.  No active signs of bleeding.    - repeat cyro 10 units today (8/9-11); trending daily fibrinogen, PTT, INR  - repeat ferritin level on 8/12     Infectious Disease:  # Recent Febrile Neutropenia   Admitted for fever. Spiking fevers, thought related to CRS, although fever curve improved in the past 24 hours.  S/p vancomycin.  Continue Cefepime and monitor blood cultures. Blood cultures have remained negative or NGTD.  Afebrile the  last 48 hours.  Covid negative 7/17 and 7/24  - monitoring     # Risk for infection given immunocompromised status:    Active: See above  Prophylaxis:                                                                      - Viral prophylaxis: HD Valtrex continue through Day +30  - Fungal prophylaxis: Micafungin continue through Day +30. History of photoxic drug eruption with voriconazole, avoid use.   - Bacterial prophylaxis: Cefepime as noted above.  - PCP prophylaxis: Bactrim to begin day +28      GI:   # Nausea, stable after starting scheduled zofran  - Scheduled medications: Schedule Zofran q6H  - PRN medications: Benadryl, Ativan     # Acid reflux:    - continue Protonix  BID  - TUMS available PRN     #Direct hyperbilirubinemia  # Transaminitis:    Possibly related to recent chemotherapy vs shock liver + CRS.  No elevation of alk phos to suggest common bile duct involvement.  No abdominal tenderness to palpation on examination or hepatomegaly.    - trending LFTs  - obtaining DARIO with dopplers to r/o obstruction and VOD  - change to SMOF lipids and started ursodiol 600 mg TID     Neuro:  # Neck pain/Headache:   Severe neck pain with associated headache most likely related to neurotoxicity.   Currently on Methylprednisolone q12 hours.  Significantly improving last 24 hours, now rated at 3-4/10 without any radiation or additional neurological symptoms  - transitioned to morphine gtt with PCA, monitoring for side effects  - See Neurotoxicity above for more detail     Oral Hygiene:   # Receding gums, risk for infection  - Continue chlorhexidine mouth wash for symptomatic control, pain now improved     Derm:  No current rash. Monitoring     Fertility:  Sperm collected pre chemotherapy for fertility preservation    Discharge Considerations: Expected lengths of hospitalization for patients with complications from stem cell transplant vary based on the complication(s) and severity(ies). A typical stay is 7-14 days.    The  above plan of care was developed by and communicated to me by the Pediatric BMT attending physician, MD John Tai MD  Peds BMT Hospitalist    Pediatric BMT Inpatient Attending Note:    Artemio was seen and evaluated by me today.       The significant interval history includes: Isael continues to report improvement in neck pain and headache. He denies any complaints today. He is on 1 mg/kg twice a day of methylprednisolone- will consider weaning this evening depending on his clinical picture. Other than above stated neurotoxicity, his Grade 3 CRS seems to be under optimal control- fever curve and hypotension resolving. Tolerating dopamine wean well, completely off now, will monitor closely. Continues to be on room air with no respiratory symptoms. Continues to up-trending inflammatory markers. Fibrinogen low- receiving cryoprecipitate. Bilirubin elevated today, has history of receiving inotozumab, no abdominal pain or tenderness, will get an ultrasound today. Discussed the ongoing plan and changes with Pam and his mother during morning rounds today.     I have reviewed the vital signs, medications and lab results.  I assisted in formulating a plan, which was instituted by the BMT team. The total amount of time spent in the admission and care of Artemio Nino today was >40 minutes, at least 50% of which was counseling and coordination of care.     Sid Girard MD    Pediatric Blood and Marrow Transplant   [facility]  Pager: [phone]         Patient Active Problem List   Diagnosis     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)     Aaron-Parkinson-White (WPW) syndrome     Bone marrow transplant candidate     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)     At risk for infection     S/P allogeneic bone marrow transplant (H)     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

## 2020-08-12 LAB
ALBUMIN SERPL-MCNC: 2.7 G/DL (ref 3.4–5)
ALP SERPL-CCNC: 127 U/L (ref 40–150)
ALT SERPL W P-5'-P-CCNC: 415 U/L (ref 0–70)
ANION GAP SERPL CALCULATED.3IONS-SCNC: 5 MMOL/L (ref 3–14)
APTT PPP: 34 SEC (ref 22–37)
AST SERPL W P-5'-P-CCNC: 229 U/L (ref 0–45)
BACTERIA SPEC CULT: NO GROWTH
BACTERIA SPEC CULT: NO GROWTH
BILIRUB SERPL-MCNC: 3.8 MG/DL (ref 0.2–1.3)
BLD PROD TYP BPU: NORMAL
BLD PROD TYP BPU: NORMAL
BLD UNIT ID BPU: 0
BLOOD PRODUCT CODE: NORMAL
BPU ID: NORMAL
BUN SERPL-MCNC: 23 MG/DL (ref 7–30)
CA-I BLD-MCNC: 4.8 MG/DL (ref 4.4–5.2)
CALCIUM SERPL-MCNC: 7.9 MG/DL (ref 8.5–10.1)
CHLORIDE SERPL-SCNC: 105 MMOL/L (ref 94–109)
CO2 SERPL-SCNC: 29 MMOL/L (ref 20–32)
CREAT SERPL-MCNC: 0.6 MG/DL (ref 0.66–1.25)
DIFFERENTIAL METHOD BLD: ABNORMAL
ERYTHROCYTE [DISTWIDTH] IN BLOOD BY AUTOMATED COUNT: 14.5 % (ref 10–15)
FERRITIN SERPL-MCNC: ABNORMAL NG/ML (ref 26–388)
FIBRINOGEN PPP-MCNC: 72 MG/DL (ref 200–420)
GFR SERPL CREATININE-BSD FRML MDRD: >90 ML/MIN/{1.73_M2}
GLUCOSE SERPL-MCNC: 241 MG/DL (ref 70–99)
HCT VFR BLD AUTO: 23.2 % (ref 40–53)
HGB BLD-MCNC: 8.1 G/DL (ref 13.3–17.7)
INR PPP: 1.48 (ref 0.86–1.14)
Lab: NORMAL
MAGNESIUM SERPL-MCNC: 2.1 MG/DL (ref 1.6–2.3)
MCH RBC QN AUTO: 29.6 PG (ref 26.5–33)
MCHC RBC AUTO-ENTMCNC: 34.9 G/DL (ref 31.5–36.5)
MCV RBC AUTO: 85 FL (ref 78–100)
NUM BPU REQUESTED: 1
PHOSPHATE SERPL-MCNC: 3 MG/DL (ref 2.5–4.5)
PLATELET # BLD AUTO: 12 10E9/L (ref 150–450)
POTASSIUM SERPL-SCNC: 3.9 MMOL/L (ref 3.4–5.3)
PROT SERPL-MCNC: 6.1 G/DL (ref 6.8–8.8)
RBC # BLD AUTO: 2.74 10E12/L (ref 4.4–5.9)
SODIUM SERPL-SCNC: 139 MMOL/L (ref 133–144)
SPECIMEN SOURCE: NORMAL
TRANSFUSION STATUS PATIENT QL: NORMAL
TRANSFUSION STATUS PATIENT QL: NORMAL
URATE SERPL-MCNC: 1.8 MG/DL (ref 3.5–7.2)
WBC # BLD AUTO: 0.4 10E9/L (ref 4–11)
YEAST SPEC QL CULT: NO GROWTH
YEAST SPEC QL CULT: NO GROWTH

## 2020-08-12 PROCEDURE — 20600000 ZZH R&B BMT

## 2020-08-12 PROCEDURE — 84550 ASSAY OF BLOOD/URIC ACID: CPT | Performed by: STUDENT IN AN ORGANIZED HEALTH CARE EDUCATION/TRAINING PROGRAM

## 2020-08-12 PROCEDURE — 25000132 ZZH RX MED GY IP 250 OP 250 PS 637: Performed by: STUDENT IN AN ORGANIZED HEALTH CARE EDUCATION/TRAINING PROGRAM

## 2020-08-12 PROCEDURE — 25000131 ZZH RX MED GY IP 250 OP 636 PS 637: Performed by: STUDENT IN AN ORGANIZED HEALTH CARE EDUCATION/TRAINING PROGRAM

## 2020-08-12 PROCEDURE — 25000128 H RX IP 250 OP 636: Performed by: PEDIATRICS

## 2020-08-12 PROCEDURE — 25800030 ZZH RX IP 258 OP 636: Performed by: NURSE PRACTITIONER

## 2020-08-12 PROCEDURE — 82728 ASSAY OF FERRITIN: CPT | Performed by: STUDENT IN AN ORGANIZED HEALTH CARE EDUCATION/TRAINING PROGRAM

## 2020-08-12 PROCEDURE — 85027 COMPLETE CBC AUTOMATED: CPT

## 2020-08-12 PROCEDURE — P9037 PLATE PHERES LEUKOREDU IRRAD: HCPCS | Performed by: NURSE PRACTITIONER

## 2020-08-12 PROCEDURE — 80053 COMPREHEN METABOLIC PANEL: CPT | Performed by: STUDENT IN AN ORGANIZED HEALTH CARE EDUCATION/TRAINING PROGRAM

## 2020-08-12 PROCEDURE — 83735 ASSAY OF MAGNESIUM: CPT | Performed by: STUDENT IN AN ORGANIZED HEALTH CARE EDUCATION/TRAINING PROGRAM

## 2020-08-12 PROCEDURE — 25000125 ZZHC RX 250: Performed by: PEDIATRICS

## 2020-08-12 PROCEDURE — 82330 ASSAY OF CALCIUM: CPT | Performed by: NURSE PRACTITIONER

## 2020-08-12 PROCEDURE — 85730 THROMBOPLASTIN TIME PARTIAL: CPT | Performed by: STUDENT IN AN ORGANIZED HEALTH CARE EDUCATION/TRAINING PROGRAM

## 2020-08-12 PROCEDURE — 93005 ELECTROCARDIOGRAM TRACING: CPT

## 2020-08-12 PROCEDURE — 25000132 ZZH RX MED GY IP 250 OP 250 PS 637: Performed by: PEDIATRICS

## 2020-08-12 PROCEDURE — 85384 FIBRINOGEN ACTIVITY: CPT | Performed by: STUDENT IN AN ORGANIZED HEALTH CARE EDUCATION/TRAINING PROGRAM

## 2020-08-12 PROCEDURE — 25800030 ZZH RX IP 258 OP 636: Performed by: PEDIATRICS

## 2020-08-12 PROCEDURE — 84100 ASSAY OF PHOSPHORUS: CPT | Performed by: STUDENT IN AN ORGANIZED HEALTH CARE EDUCATION/TRAINING PROGRAM

## 2020-08-12 PROCEDURE — 25000128 H RX IP 250 OP 636: Performed by: STUDENT IN AN ORGANIZED HEALTH CARE EDUCATION/TRAINING PROGRAM

## 2020-08-12 PROCEDURE — 85610 PROTHROMBIN TIME: CPT | Performed by: STUDENT IN AN ORGANIZED HEALTH CARE EDUCATION/TRAINING PROGRAM

## 2020-08-12 RX ORDER — LEVOFLOXACIN 500 MG/1
500 TABLET, FILM COATED ORAL DAILY
Status: DISCONTINUED | OUTPATIENT
Start: 2020-08-12 | End: 2020-08-14 | Stop reason: HOSPADM

## 2020-08-12 RX ORDER — PREDNISONE 10 MG/1
40 TABLET ORAL 2 TIMES DAILY
Status: DISCONTINUED | OUTPATIENT
Start: 2020-08-12 | End: 2020-08-13

## 2020-08-12 RX ORDER — FUROSEMIDE 10 MG/ML
10 INJECTION INTRAMUSCULAR; INTRAVENOUS ONCE
Status: COMPLETED | OUTPATIENT
Start: 2020-08-12 | End: 2020-08-12

## 2020-08-12 RX ADMIN — ONDANSETRON 4 MG: 2 INJECTION INTRAMUSCULAR; INTRAVENOUS at 08:01

## 2020-08-12 RX ADMIN — ONDANSETRON 4 MG: 2 INJECTION INTRAMUSCULAR; INTRAVENOUS at 19:54

## 2020-08-12 RX ADMIN — LEVOFLOXACIN 500 MG: 500 TABLET, FILM COATED ORAL at 10:24

## 2020-08-12 RX ADMIN — PANTOPRAZOLE SODIUM 40 MG: 40 TABLET, DELAYED RELEASE ORAL at 07:56

## 2020-08-12 RX ADMIN — SODIUM CHLORIDE: 9 INJECTION, SOLUTION INTRAVENOUS at 14:07

## 2020-08-12 RX ADMIN — ONDANSETRON 4 MG: 2 INJECTION INTRAMUSCULAR; INTRAVENOUS at 14:05

## 2020-08-12 RX ADMIN — CEFEPIME 2000 MG: 2 INJECTION, POWDER, FOR SOLUTION INTRAVENOUS at 00:26

## 2020-08-12 RX ADMIN — VALACYCLOVIR HYDROCHLORIDE 1000 MG: 500 TABLET, FILM COATED ORAL at 07:56

## 2020-08-12 RX ADMIN — URSODIOL 600 MG: 300 CAPSULE ORAL at 14:07

## 2020-08-12 RX ADMIN — URSODIOL 600 MG: 300 CAPSULE ORAL at 07:56

## 2020-08-12 RX ADMIN — SMOFLIPID 250 ML: 6; 6; 5; 3 INJECTION, EMULSION INTRAVENOUS at 20:04

## 2020-08-12 RX ADMIN — PHYTONADIONE: 1 INJECTION, EMULSION INTRAMUSCULAR; INTRAVENOUS; SUBCUTANEOUS at 20:04

## 2020-08-12 RX ADMIN — ONDANSETRON 4 MG: 2 INJECTION INTRAMUSCULAR; INTRAVENOUS at 01:40

## 2020-08-12 RX ADMIN — METHYLPREDNISOLONE SODIUM SUCCINATE 32 MG: 40 INJECTION, POWDER, LYOPHILIZED, FOR SOLUTION INTRAMUSCULAR; INTRAVENOUS at 03:55

## 2020-08-12 RX ADMIN — FUROSEMIDE 10 MG: 10 INJECTION, SOLUTION INTRAMUSCULAR; INTRAVENOUS at 14:05

## 2020-08-12 RX ADMIN — DIPHENHYDRAMINE HYDROCHLORIDE 50 MG: 50 INJECTION, SOLUTION INTRAMUSCULAR; INTRAVENOUS at 07:57

## 2020-08-12 RX ADMIN — PANTOPRAZOLE SODIUM 40 MG: 40 TABLET, DELAYED RELEASE ORAL at 16:07

## 2020-08-12 RX ADMIN — VALACYCLOVIR HYDROCHLORIDE 1000 MG: 500 TABLET, FILM COATED ORAL at 14:07

## 2020-08-12 RX ADMIN — CEFEPIME 2000 MG: 2 INJECTION, POWDER, FOR SOLUTION INTRAVENOUS at 07:57

## 2020-08-12 RX ADMIN — ACETAMINOPHEN 975 MG: 325 TABLET, FILM COATED ORAL at 07:56

## 2020-08-12 RX ADMIN — MICAFUNGIN SODIUM 50 MG: 10 INJECTION, POWDER, LYOPHILIZED, FOR SOLUTION INTRAVENOUS at 19:59

## 2020-08-12 NOTE — PLAN OF CARE
Afebrile. B/P within ordered parameter with MAP=65-67  Dopamine drip remains off. Patient denies pain on current Morphine drip and has taken no boluses from PCA. Denies nausea and is taking oral meds fine. TPN cycled over night. Mother at bedside, updated on plan of care.

## 2020-08-12 NOTE — PLAN OF CARE
Patient has been afebrile, with a temperature max of 99.1F. Lung sounds clear, diminished on the bases with SaO2 in the upper 90's. Blood pressure  at the beginning of the shift was 89/50, MD Morales aware. BP recheck done and it was 95-96/51-57, MAP of 64-68. Voiding, no stool this shift. Patient needs platelets , ordered and to be given on days. On PCA Morphine , didn't use any bump this shift. MD also aware of patient's Fibrinogen level. Hourly rounding completed. Continue to monitor and refer for any concerns.

## 2020-08-12 NOTE — PROGRESS NOTES
Pediatric BMT Daily Progress Note    Interval Events:    No acute events overnight.  Afebrile last 24 hours.  No new signs or symptoms this morning.  HDS stable off pressors.  No new signs or symptoms on interval history this morning.   Ongoing neck pain stable at 3 without radiation.  Received cryo on on 8/11 without incident.  Appropriate UOP.  Questions and concerns addressed at bedside during morning rounds.    Review of Systems: Pertinent positives include those mentioned in interval events. A complete review of systems was performed and is otherwise negative.      Medications:  Please see MAR    Physical Exam:  Temp:  [98  F (36.7  C)-100.3  F (37.9  C)] 99.1  F (37.3  C)  Pulse:  [73-74] 73  Heart Rate:  [71-83] 77  Resp:  [16-18] 18  BP: ()/(49-60) 96/57  Cuff Mean (mmHg):  [63-69] 65  SpO2:  [97 %-99 %] 97 %  I/O last 3 completed shifts:  In: 3070.4 [I.V.:1511.8]  Out: 2850 [Urine:2850]   GEN: Laying in bed, interactive, answers questions appropriately. Mental status intact.  Flat affect.  Mother at bedside.    HEENT: Full head of hair, normocephalic, sclerae clear, nares patent, no rhinorrhea, no noted cough. Mucous membranes moist, no oral lesions appreciated, oropharynx clear.   CARD: Normal rhythm with borderline bradycardia, normal S1/S2 without murmur. Cap refill < 2 sec.   RESP: Good air movement noted, no wheezing or crackles on auscultation.  CTAB.    ABD: Soft, non tender, nondistended. No organomegaly appreciated.   EXTREM: warm and well perfused, pulses 2+ throughout.    SKIN: Pale throughout, no bruising or petechia noted.    ACCESS: CVC double lumen (L) and single port (R)    Labs:  Labs reviewed, pertinent findings WBC 0.4, Hgb 8.1, platelets 12. BUN 23, creat 0.6,  Uric acid 1.8.    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 Relapsed with JAK2 - pre B  cell ALL, CNS1 in June 2020. CD19 positive (dim).      Day +16.  Isael completed lymphodepleting chemotherapy prep as an outpatient. He was admitted for fever on 7/24, blood cultures no growth to date.    Continues with Grade 1/2 CRS no without fevers, off vasopressors, and now transitioning to oral prednisone.   Continuing to monitor closely.  Monitoring coags, LFTs, and lytes daily.  Weaning off morphine gtt given lack of needs and pain well controlled.  Transitioned to PO levaquin from IV cefepime for empiric coverage for febrile neutropenia.  He will receive additional lasix dose today given increased weight from baseline.      ALL and BMT: High risk B cell ALL (CNS negative) + JAK2 activation.  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  - Completed apheresis collection in the [facility] (6/2020).   - He has been receiving bridging chemotherapy with oral 6MP, methotrexate and ruxolitinib. All oral chemotherapy stopped on 7/12.   - BM revealed 75% involvement with leukemia. CSF was negative.   - Outpatient lymphodepleting chemotherapy per [id number] with cytoxan and fludarabine 7/21-7/24 completed as an outpatient  - Awaiting count recovery    [facility] infusion 7/27   - Pre-meds of Tylenol and Benadryl   - Flush completed     BMT Daily CARTOX Note (complete days 0-14 and with any subsequent CRS/neurotoxicity)    Date: August 11, 2020    Artemio Nino ([id number]) is a 20 year old year old male who received infusion on 7/27, currently day +11 of CAR-T cell therapy Kymriah    Vital Signs: BP 96/57   Pulse 73   Temp 99.1  F (37.3  C) (Oral)   Resp 18   Wt 59.5 kg (131 lb 2.8 oz)   SpO2 97%   BMI 19.32 kg/m      1) CRS Grading (based ONLY on parameters in box below)    Fever:   > 100.4    Blood pressure:   SBP >/= 90 (not hypotensive)    Oxygen saturation:     >/= 90% on room air (not hypoxic)    CRS Parameter Grade 1  Grade 2 Grade 3 Grade 4   Fever* Tm >= 100.4 degrees F Tm >= 100.4 degrees F Tm >= 100.4 degrees F Tm >= to 100.4  degrees F      With Either:  Hypotension (SBP <90) None Responsive to Fluids  Requiring 1  vasopressor (w/ or w/o vasopressin) Requiring multiple  vasopressors  (excluding  vasopressin)     And/or  Hypoxia (O2 sats <90% on room air) None Low-flow nasal  cannula or blow-by High-flow nasal  cannula, face mask,  non-rebreather mask,or Venturi mask  Requiring positive  pressure (CPAP,  BiPAP intubation and  mechanical  ventilation)     CRS Summary  Does patient have CRS? Yes, date of onset: 7/28/20, initial grade 2  Current CRS grade: 1/2  What therapy was given:   - 7/28: IV Antibiotics  - 8/6: One 500 mL fluid bolus, dopamine 2 mcg/kg/min, increased dopamine to 7 mcg/kg/min. Gave 1 dose of Tociluzimab. IV fluids 100 mL/hr.  -8/7: 2nd dose of Tociluzimab  -8/8: 3rd dose of Tociluzimab and initiated Methylprednisolone  8/9-10 Continue methylprednisolone Q12H  - 8/11: plan to wean methylpred to 0.5 mg/kg Q12h pending symptoms and vasopressor needs  - 8/12: vasopressors off, afebrile, intermittent bradycardia  Has CRS grade changed?  Yes, 8/6: increased from grade 2 to grade 3, he is currently grade 1/2  Has CRS resolved?  No    2) Neurotoxicity:  Isael continues to report neck pain with associated headache (first noted on 8/6 with significant worsening over 48 hours and now improving) and vomiting. He has full range of motion in his neck, no nuchal rigidity and denies sensitivity to light.  Morphine and Tylenol have been ineffective at controlling neck pain/headache. Head CT performed 8/7 showing no intracranial pathology.  The neck pain continues to resolve day over day now 3-4/10 without any radiation or additional neurological symptoms, stable on 8/12.     Isael does not appear to have any symptoms of encephalopathy, such as: altered mental  status, confusion, acting out of character, nor has he had trouble swallowing or speaking, nor involuntary eye or muscle movements.     ICE Assessment  Orientation to year, month, city, hospital: 4 points, Name 3 objects (e.g., point to clock, pen, button): 3 point, Following commands: (e.g., Show me 2 fingers): 1 point, Write a standard sentence (e.g., The pickett jumped over the log): 1 point and Count backwards from 100 by ten: 1 point  Total points: 10: No impairment    ASTCT ICANS Consensus Grading for Adults  ICE Score   10    Seizure   No seizures    Motor Findings   No motor findings    Elevated ICP/Cerebral Edema NA      Neurotoxicity  Domain Grade 1 Grade 2 Grade 3 Grade 4   ICE score 7-9 3-6 1-2 0   Depressed LOC      Awakens  spontaneously Awakens to  voice  Awakens only to  tactile stimulation Unarousable or  requires vigorous  or repetitive  tactile stimuli to  arouse. Stupor  or coma.   Seizure n/a n/a Any clinical  seizure focal or  generalized that  resolves rapidly  or nonconvulsive  seizures on EEG  that resolve with  intervention  Life-threatening  prolonged  seizure (>5 min);  or Repetitive  clinical or  electrical  seizures without  return to baseline  in between    Motor Findings      n/a n/a n/a Deep focal motor  weakness such  as hemiparesis  or paraparesis   Elevated  ICP/cerebral  edema  n/a n/a Focal/local  edema on  neuroimaging  Diffuse cerebral  edema on  neuroimaging;  decerebrate or  decorticate  posturing; or  cranial nerve VI  palsy; or  papilledema; or  Cushing's triad     ICANS grade is determined by the most severe event (ICE score, level of consciousness, seizure, motor findings, raised ICP/cerebral edema) not attributable to any other cause; for example, a patient with an ICE score of 3 who has a generalized seizure is classified as grade 3 ICANS.    A patient with an ICE score of 0 may be classified as grade 3 ICANS if awake with global aphasia, but a patient with an ICE score of  0 may be classified as grade 4 ICANS if unarousable.     Depressed level of consciousness should be attributable to no other cause (eg, no sedating medication)    Tremors and myoclonus associated with immune effector cell therapies may be graded according to CTCAE v5.0,but they do not influence ICANS grading.     Intracranial hemorrhage with or without associated edema is not considered a neurotoxicity feature and is  excluded from ICANS grading. It may be graded according to CTCAE v5.0.        Neurotoxicity Summary  Does patient have neurotoxicity? Yes  Current Neurotoxicity score (0-10): 10  What therapy was given: Yes, methylprednisolone transitioned to prednisone on 8/12  Has neurotoxicity grade changed? no  Has neurotoxicity resolved? no    3) Organ CAR-T toxicity:    Cardiac   No current vasopressor needs    Respiratory   Cough (8/6-8/8). No associated increased work of breathing, hypoxia or need for supplemental oxygen.  Now resolved.     Gastrointestinal   Nausea/vomiting, stable    Hepatic    none    Skin   none     Coagulopathy    Last checked 8/12: INR1.48, PRR 34, fibrinogen 72.    Other:     4) HLH   Ferritin > 10,000 plus any TWO of the following: NA     * CTCAE grading can be found at   https://ctep.cancer.gov/protocoldevelopment/electronic_applications/docs/CTCAE_v5_Quick Reference_8.5x11.pdf    FEN/Renal:   # Risk for malnutrition: Continues to drink some, not eating.  - Continue nutritional support with TPN/SMOF lipids    - Monitor nutritional intake     # Risk for electrolyte abnormalities:  - Check daily electrolytes     # Risk for renal dysfunction/risk for ELIZ secondary to potential tumor lysis with preparatory chemotherapy: GFR 133ml/min  - Daily electrolyte monitoring  - PRN lasix 10 mg IV today given increased IV fluids related to cryoprecipitate   - Monitor daily weights     # Risk for TLS: Uric Acid 1.8     Pulmonary:  # Risk for pulmonary insufficiency: No current concerns. Normal chest  "and sinus CTs during work-up.     # Cough, resolved  Isael reported his \"lungs hurt\" a few days ago. No issues today. He has experiencing an intermittent cough over the past few days, which was worsened throughout the night, but now improved/resolved this morning.   - Monitoring closely     Cardiovascular:  # Hypotension, resolved  Continued on Dopamine, titrating as indicated. Currently at 4 mcg/kg/minute with plans to wean off today as tolerated.  Titrated off dopamine on 8/11.    - monitoring closely    # Aaron-Parkinson-White Syndrome: diagnosed upon syncope in 02/2018. Asymptomatic, no interventions to date. No current concerns.  - EKG 7/15 revealed WPW with normal sinus rhythm  - Echocardiogram 7/15 showed LVEF of 62%     Heme:   # Pancytopenia secondary to chemotherapy:  - Transfuse for hemoglobin < 7, platelets < 20,000, platelet parameter increased 8/6 due to mild epistaxis & hematemesis with streaks of blood. No reports in the past 24 hours.    -  Mild hives noted at end of pRBC transfusion on 7/25. Premedicate with Tylenol and Benadryl for pRBC transfusions.   - Recent history of significant hives with platelet transfusions. Was being premedicated with hydrocortisone, tylenol, and benadryl for platelets. However, no hydrocortisone premedication ordered now given CAR-T.   - Isael reported he had a few hives on his leg with recent platelet transfusion , non pruritic, self resolved, did not report to nursing.  - No GCSF per protocol    #Coagulopathy related to CRS  No concern for DIC currently.  No active signs of bleeding.    - repeat cyro 10 units today (8/9-11); trending daily fibrinogen, PTT, INR  - repeat ferritin level on 8/12 elevated in ~70k     Infectious Disease:  # Recent Febrile Neutropenia   Admitted for fever. Spiking fevers, thought related to CRS, although fever curve improved in the past 24 hours.  S/p vancomycin.  Blood cultures have remained negative or NGTD.  Afebrile the last 48 hours.  " Covid negative 7/17 and 7/24  - monitoring  - transitioned to PO levaquin from IV cefepime in anticipation of discharge in coming days     # Risk for infection given immunocompromised status:    Active: See above  Prophylaxis:                                                                      - Viral prophylaxis: HD Valtrex continue through Day +30  - Fungal prophylaxis: Micafungin continue through Day +30. History of photoxic drug eruption with voriconazole, avoid use.   - Bacterial prophylaxis: Cefepime as noted above.  - PCP prophylaxis: Bactrim to begin day +28      GI:   # Nausea, stable after starting scheduled zofran  - Scheduled medications: Schedule Zofran q6H  - PRN medications: Benadryl, Ativan     # Acid reflux:    - continue Protonix  BID  - TUMS available PRN     #Direct hyperbilirubinemia  # Transaminitis:    Possibly related to recent chemotherapy vs shock liver + CRS.  No elevation of alk phos to suggest common bile duct involvement.  No abdominal tenderness to palpation on examination or hepatomegaly.  DARIO with dopplers r/o obstruction and VOD; noted sludge with minimal pericholecystic fluid, negative sonographic sign.    - trending LFTs and bilirubin  - change to SMOF lipids and started ursodiol 600 mg TID     Neuro:  # Neck pain/Headache, resolving  Severe neck pain with associated headache most likely related to neurotoxicity.   Currently on Methylprednisolone q12 hours.  Significantly improving last 24 hours, now rated at 3-4/10 without any radiation or additional neurological symptoms stable day over day (8/11-12)  - weaning morphine gtt with PCA, monitoring for side effects  - See Neurotoxicity above for more detail     Oral Hygiene:   # Receding gums, risk for infection  - Continue chlorhexidine mouth wash for symptomatic control, pain now improved     Derm:  No current rash. Monitoring     Fertility:  Sperm collected pre chemotherapy for fertility preservation    Discharge  Considerations: Expected lengths of hospitalization for patients with complications from stem cell transplant vary based on the complication(s) and severity(ies). A typical stay is 7-14 days.    The above plan of care was developed by and communicated to me by the Pediatric BMT attending physician, MD John Tai MD  Peds BMT Hospitalist    Pediatric BMT Inpatient Attending Note:    Artemio was seen and evaluated by me today.       The significant interval history includes: Isael continues to report improvement in neck pain and headache. He denies any complaints today. He is on 1 mg/kg twice a day of methylprednisolone- will consider weaning this evening depending on his clinical picture. Other than above stated neurotoxicity, his Grade 3 CRS seems to be under optimal control- fever curve and hypotension resolving. Tolerating dopamine wean well, completely off now, will monitor closely. Continues to be on room air with no respiratory symptoms. Continues to up-trending inflammatory markers. Fibrinogen low- receiving cryoprecipitate. Bilirubin elevated today, has history of receiving inotozumab, no abdominal pain or tenderness, will get an ultrasound today. Discussed the ongoing plan and changes with Pam and his mother during morning rounds today.     I have reviewed the vital signs, medications and lab results.  I assisted in formulating a plan, which was instituted by the BMT team. The total amount of time spent in the admission and care of Artemio Nino today was >40 minutes, at least 50% of which was counseling and coordination of care.     Sid Girard MD    Pediatric Blood and Marrow Transplant   [facility]  Pager: [phone]         Patient Active Problem List   Diagnosis     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)     Aaron-Parkinson-White (WPW) syndrome     Bone marrow transplant candidate     ALL (acute lymphoblastic leukemia of  infant) (H)     At risk for infection     S/P allogeneic bone marrow transplant (H)     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

## 2020-08-12 NOTE — PLAN OF CARE
"Isael has been afebrile. HR's dropped to the 40's, MD Figueroa Núñez notified and EKG was ordered. Plan is to continue to monitor hr's. BP;s stable 80-90's over 50-60's with maps 62-68. Lung sounds clear on room air. No indications of pain or nausea. Decreased Continous morphine along with PCA dose. No bumps taken throughout the day. RN asked Isael several times about showering, each time he responded \"I will do it later tonight\" RN then asked if Isael had completed his oral cares and he said no but he will do them, he has not completed the shower or oral cares for the day even when RN continues to remind or schedule a time but still refuses. Isael has spent the day in bed and continues to use urinals. Otherwise no significant events. Hourly rounding completed, will continue to monitor.   "

## 2020-08-13 LAB
ALBUMIN SERPL-MCNC: 2.6 G/DL (ref 3.4–5)
ALP SERPL-CCNC: 120 U/L (ref 40–150)
ALT SERPL W P-5'-P-CCNC: 497 U/L (ref 0–70)
ANION GAP SERPL CALCULATED.3IONS-SCNC: 6 MMOL/L (ref 3–14)
APTT PPP: 30 SEC (ref 22–37)
AST SERPL W P-5'-P-CCNC: 223 U/L (ref 0–45)
BACTERIA SPEC CULT: NO GROWTH
BACTERIA SPEC CULT: NO GROWTH
BASOPHILS # BLD AUTO: 0 10E9/L (ref 0–0.2)
BASOPHILS NFR BLD AUTO: 1 %
BILIRUB SERPL-MCNC: 2.1 MG/DL (ref 0.2–1.3)
BUN SERPL-MCNC: 23 MG/DL (ref 7–30)
BURR CELLS BLD QL SMEAR: SLIGHT
CA-I BLD-MCNC: 4.6 MG/DL (ref 4.4–5.2)
CALCIUM SERPL-MCNC: 7.6 MG/DL (ref 8.5–10.1)
CHLORIDE SERPL-SCNC: 105 MMOL/L (ref 94–109)
CO2 SERPL-SCNC: 30 MMOL/L (ref 20–32)
CREAT SERPL-MCNC: 0.57 MG/DL (ref 0.66–1.25)
CRP SERPL-MCNC: 6.5 MG/L (ref 0–8)
D DIMER PPP FEU-MCNC: 17.1 UG/ML FEU (ref 0–0.5)
DACRYOCYTES BLD QL SMEAR: SLIGHT
DIFFERENTIAL METHOD BLD: ABNORMAL
EOSINOPHIL # BLD AUTO: 0 10E9/L (ref 0–0.7)
EOSINOPHIL NFR BLD AUTO: 0 %
ERYTHROCYTE [DISTWIDTH] IN BLOOD BY AUTOMATED COUNT: 14.6 % (ref 10–15)
FIBRINOGEN PPP-MCNC: 95 MG/DL (ref 200–420)
GFR SERPL CREATININE-BSD FRML MDRD: >90 ML/MIN/{1.73_M2}
GLUCOSE SERPL-MCNC: 166 MG/DL (ref 70–99)
HCT VFR BLD AUTO: 24.1 % (ref 40–53)
HGB BLD-MCNC: 8.3 G/DL (ref 13.3–17.7)
INR PPP: 1.24 (ref 0.86–1.14)
INTERPRETATION ECG - MUSE: NORMAL
LDH SERPL L TO P-CCNC: 693 U/L (ref 85–227)
LYMPHOCYTES # BLD AUTO: 0.6 10E9/L (ref 0.8–5.3)
LYMPHOCYTES NFR BLD AUTO: 80.5 %
Lab: NORMAL
Lab: NORMAL
MAGNESIUM SERPL-MCNC: 2 MG/DL (ref 1.6–2.3)
MCH RBC QN AUTO: 29.4 PG (ref 26.5–33)
MCHC RBC AUTO-ENTMCNC: 34.4 G/DL (ref 31.5–36.5)
MCV RBC AUTO: 86 FL (ref 78–100)
MONOCYTES # BLD AUTO: 0 10E9/L (ref 0–1.3)
MONOCYTES NFR BLD AUTO: 3.9 %
NEUTROPHILS # BLD AUTO: 0.1 10E9/L (ref 1.6–8.3)
NEUTROPHILS NFR BLD AUTO: 14.6 %
PHOSPHATE SERPL-MCNC: 2.4 MG/DL (ref 2.5–4.5)
PLATELET # BLD AUTO: 21 10E9/L (ref 150–450)
PLATELET # BLD EST: ABNORMAL 10*3/UL
POIKILOCYTOSIS BLD QL SMEAR: SLIGHT
POTASSIUM SERPL-SCNC: 3.1 MMOL/L (ref 3.4–5.3)
PROT SERPL-MCNC: 6 G/DL (ref 6.8–8.8)
RBC # BLD AUTO: 2.82 10E12/L (ref 4.4–5.9)
SODIUM SERPL-SCNC: 141 MMOL/L (ref 133–144)
SPECIMEN SOURCE: NORMAL
SPECIMEN SOURCE: NORMAL
URATE SERPL-MCNC: 2.2 MG/DL (ref 3.5–7.2)
WBC # BLD AUTO: 0.8 10E9/L (ref 4–11)

## 2020-08-13 PROCEDURE — 82330 ASSAY OF CALCIUM: CPT | Performed by: NURSE PRACTITIONER

## 2020-08-13 PROCEDURE — 25000125 ZZHC RX 250: Performed by: PEDIATRICS

## 2020-08-13 PROCEDURE — 85379 FIBRIN DEGRADATION QUANT: CPT | Performed by: STUDENT IN AN ORGANIZED HEALTH CARE EDUCATION/TRAINING PROGRAM

## 2020-08-13 PROCEDURE — 83615 LACTATE (LD) (LDH) ENZYME: CPT | Performed by: STUDENT IN AN ORGANIZED HEALTH CARE EDUCATION/TRAINING PROGRAM

## 2020-08-13 PROCEDURE — 25000125 ZZHC RX 250: Performed by: STUDENT IN AN ORGANIZED HEALTH CARE EDUCATION/TRAINING PROGRAM

## 2020-08-13 PROCEDURE — 85730 THROMBOPLASTIN TIME PARTIAL: CPT | Performed by: STUDENT IN AN ORGANIZED HEALTH CARE EDUCATION/TRAINING PROGRAM

## 2020-08-13 PROCEDURE — 25800030 ZZH RX IP 258 OP 636: Performed by: STUDENT IN AN ORGANIZED HEALTH CARE EDUCATION/TRAINING PROGRAM

## 2020-08-13 PROCEDURE — 84550 ASSAY OF BLOOD/URIC ACID: CPT | Performed by: STUDENT IN AN ORGANIZED HEALTH CARE EDUCATION/TRAINING PROGRAM

## 2020-08-13 PROCEDURE — 83735 ASSAY OF MAGNESIUM: CPT | Performed by: STUDENT IN AN ORGANIZED HEALTH CARE EDUCATION/TRAINING PROGRAM

## 2020-08-13 PROCEDURE — 25000131 ZZH RX MED GY IP 250 OP 636 PS 637: Performed by: STUDENT IN AN ORGANIZED HEALTH CARE EDUCATION/TRAINING PROGRAM

## 2020-08-13 PROCEDURE — 86140 C-REACTIVE PROTEIN: CPT | Performed by: STUDENT IN AN ORGANIZED HEALTH CARE EDUCATION/TRAINING PROGRAM

## 2020-08-13 PROCEDURE — 85384 FIBRINOGEN ACTIVITY: CPT | Performed by: STUDENT IN AN ORGANIZED HEALTH CARE EDUCATION/TRAINING PROGRAM

## 2020-08-13 PROCEDURE — 25000128 H RX IP 250 OP 636: Performed by: STUDENT IN AN ORGANIZED HEALTH CARE EDUCATION/TRAINING PROGRAM

## 2020-08-13 PROCEDURE — 84100 ASSAY OF PHOSPHORUS: CPT | Performed by: STUDENT IN AN ORGANIZED HEALTH CARE EDUCATION/TRAINING PROGRAM

## 2020-08-13 PROCEDURE — 25000128 H RX IP 250 OP 636: Performed by: PEDIATRICS

## 2020-08-13 PROCEDURE — 20600000 ZZH R&B BMT

## 2020-08-13 PROCEDURE — 25000128 H RX IP 250 OP 636: Performed by: NURSE PRACTITIONER

## 2020-08-13 PROCEDURE — 85025 COMPLETE CBC W/AUTO DIFF WBC: CPT | Performed by: STUDENT IN AN ORGANIZED HEALTH CARE EDUCATION/TRAINING PROGRAM

## 2020-08-13 PROCEDURE — 25800030 ZZH RX IP 258 OP 636: Performed by: PEDIATRICS

## 2020-08-13 PROCEDURE — 85610 PROTHROMBIN TIME: CPT | Performed by: STUDENT IN AN ORGANIZED HEALTH CARE EDUCATION/TRAINING PROGRAM

## 2020-08-13 PROCEDURE — 25000132 ZZH RX MED GY IP 250 OP 250 PS 637: Performed by: STUDENT IN AN ORGANIZED HEALTH CARE EDUCATION/TRAINING PROGRAM

## 2020-08-13 PROCEDURE — 80053 COMPREHEN METABOLIC PANEL: CPT | Performed by: STUDENT IN AN ORGANIZED HEALTH CARE EDUCATION/TRAINING PROGRAM

## 2020-08-13 RX ORDER — LEVOFLOXACIN 500 MG/1
500 TABLET, FILM COATED ORAL DAILY
Qty: 30 TABLET | Refills: 0 | Status: SHIPPED | OUTPATIENT
Start: 2020-08-14 | End: 2020-08-28

## 2020-08-13 RX ORDER — LORAZEPAM 0.5 MG/1
0.5 TABLET ORAL EVERY 6 HOURS PRN
Qty: 20 TABLET | Refills: 0 | Status: SHIPPED | OUTPATIENT
Start: 2020-08-13 | End: 2020-08-28

## 2020-08-13 RX ORDER — ONDANSETRON 4 MG/1
4 TABLET, ORALLY DISINTEGRATING ORAL EVERY 6 HOURS
Status: DISCONTINUED | OUTPATIENT
Start: 2020-08-13 | End: 2020-08-14 | Stop reason: HOSPADM

## 2020-08-13 RX ORDER — URSODIOL 300 MG/1
600 CAPSULE ORAL 3 TIMES DAILY
Qty: 180 CAPSULE | Refills: 2 | Status: SHIPPED | OUTPATIENT
Start: 2020-08-13 | End: 2020-08-28

## 2020-08-13 RX ORDER — ONDANSETRON 4 MG/1
4 TABLET, ORALLY DISINTEGRATING ORAL EVERY 6 HOURS
Qty: 120 TABLET | Refills: 0 | Status: SHIPPED | OUTPATIENT
Start: 2020-08-13 | End: 2020-08-22

## 2020-08-13 RX ORDER — PREDNISONE 20 MG/1
20 TABLET ORAL DAILY
Status: DISCONTINUED | OUTPATIENT
Start: 2020-08-13 | End: 2020-08-14 | Stop reason: HOSPADM

## 2020-08-13 RX ORDER — PANTOPRAZOLE SODIUM 40 MG/1
40 TABLET, DELAYED RELEASE ORAL
Qty: 60 TABLET | Refills: 2 | Status: SHIPPED | OUTPATIENT
Start: 2020-08-13 | End: 2020-08-28

## 2020-08-13 RX ADMIN — ONDANSETRON 4 MG: 2 INJECTION INTRAMUSCULAR; INTRAVENOUS at 07:43

## 2020-08-13 RX ADMIN — SMOFLIPID 250 ML: 6; 6; 5; 3 INJECTION, EMULSION INTRAVENOUS at 19:46

## 2020-08-13 RX ADMIN — POTASSIUM PHOSPHATE, MONOBASIC AND POTASSIUM PHOSPHATE, DIBASIC 15 MMOL: 224; 236 INJECTION, SOLUTION INTRAVENOUS at 15:50

## 2020-08-13 RX ADMIN — URSODIOL 600 MG: 300 CAPSULE ORAL at 19:45

## 2020-08-13 RX ADMIN — VALACYCLOVIR HYDROCHLORIDE 1000 MG: 500 TABLET, FILM COATED ORAL at 07:43

## 2020-08-13 RX ADMIN — LORAZEPAM 0.5 MG: 2 INJECTION INTRAMUSCULAR; INTRAVENOUS at 19:47

## 2020-08-13 RX ADMIN — Medication: at 15:50

## 2020-08-13 RX ADMIN — ONDANSETRON 4 MG: 4 TABLET, ORALLY DISINTEGRATING ORAL at 15:09

## 2020-08-13 RX ADMIN — VALACYCLOVIR HYDROCHLORIDE 1000 MG: 500 TABLET, FILM COATED ORAL at 15:09

## 2020-08-13 RX ADMIN — ONDANSETRON 4 MG: 4 TABLET, ORALLY DISINTEGRATING ORAL at 19:46

## 2020-08-13 RX ADMIN — VALACYCLOVIR HYDROCHLORIDE 1000 MG: 500 TABLET, FILM COATED ORAL at 19:45

## 2020-08-13 RX ADMIN — PREDNISONE 20 MG: 20 TABLET ORAL at 09:48

## 2020-08-13 RX ADMIN — MICAFUNGIN SODIUM 50 MG: 10 INJECTION, POWDER, LYOPHILIZED, FOR SOLUTION INTRAVENOUS at 20:58

## 2020-08-13 RX ADMIN — PANTOPRAZOLE SODIUM 40 MG: 40 TABLET, DELAYED RELEASE ORAL at 15:50

## 2020-08-13 RX ADMIN — URSODIOL 600 MG: 300 CAPSULE ORAL at 07:43

## 2020-08-13 RX ADMIN — URSODIOL 600 MG: 300 CAPSULE ORAL at 15:09

## 2020-08-13 RX ADMIN — ONDANSETRON 4 MG: 2 INJECTION INTRAMUSCULAR; INTRAVENOUS at 02:02

## 2020-08-13 RX ADMIN — PANTOPRAZOLE SODIUM 40 MG: 40 TABLET, DELAYED RELEASE ORAL at 07:43

## 2020-08-13 RX ADMIN — LEVOFLOXACIN 500 MG: 500 TABLET, FILM COATED ORAL at 09:48

## 2020-08-13 RX ADMIN — PHYTONADIONE: 1 INJECTION, EMULSION INTRAMUSCULAR; INTRAVENOUS; SUBCUTANEOUS at 19:46

## 2020-08-13 NOTE — PLAN OF CARE
[[id number]] Afebrile. LSC on RA, diminished in the bases. HR steady in the low 60s, lowest I saw was 55. BP 90/50s. One reading MAP was less than 65. Had some french fries for dinner but ended up throwing up including 8 o'clock meds. Denied any antinausea meds. Denies pain, went down on his morphine gtt. No replacements needed overnight. Refusing shower and oral care. Adequate UOP, no stool. Hourly rounding complete. Continue with POC.

## 2020-08-13 NOTE — PLAN OF CARE
Afebrile, OVSS. LSC, on RA. No pain or nausea reported. Eating small meals today. Good UOP. Morphine PCA decreased. Phos replacement currently infusing.

## 2020-08-13 NOTE — PROGRESS NOTES
Pediatric BMT Daily Progress Note    Interval Events:    No acute events overnight.  Afebrile last 24 hours.  No new signs or symptoms this morning.   Stable heart rate since morning of 8/12 without any symptoms.  No associated headache or neck pain.  Nausea evening of 8/12 with 1x NBNB emesis.  Appropriate UOP.  Questions and concerns addressed at bedside during morning rounds.    Review of Systems: Pertinent positives include those mentioned in interval events. A complete review of systems was performed and is otherwise negative.      Medications:  Please see MAR    Physical Exam:  Temp:  [97  F (36.1  C)-98.7  F (37.1  C)] 98.4  F (36.9  C)  Pulse:  [57-64] 57  Heart Rate:  [58-68] 67  Resp:  [16-18] 16  BP: (89-97)/(48-57) 94/53  SpO2:  [98 %-100 %] 98 %  I/O last 3 completed shifts:  In: 3228.4 [P.O.:100; I.V.:1370]  Out: 2903 [Urine:2650; Emesis/NG output:253]   GEN: Laying in bed, interactive, answers questions appropriately. Mental status intact.  Flat affect.  Mother at bedside.    HEENT: Full head of hair, normocephalic, sclerae clear, nares patent, no rhinorrhea, no noted cough. Mucous membranes moist, no oral lesions appreciated, oropharynx clear.   CARD: Normal rhythm with borderline bradycardia, normal S1/S2 without murmur. Cap refill < 2 sec.   RESP: Good air movement noted, no wheezing or crackles on auscultation.  CTAB.    ABD: Soft, non tender, nondistended. No organomegaly appreciated.   EXTREM: warm and well perfused, pulses 2+ throughout.    SKIN: Pale throughout, no bruising or petechia noted.    ACCESS: CVC double lumen (L) and single port (R)    Labs:  Labs reviewed, pertinent findings WBC 0.4, Hgb 8.1, platelets 12. BUN 23, creat 0.6,  Uric acid 1.8.    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 CD19  positive (dim).      Day +17.  Isael completed lymphodepleting chemotherapy prep as an outpatient. He was admitted for fever on 7/24, blood cultures no growth to date.    Continues with Grade 1/2 CRS no without fevers, off vasopressors, and now transitioning to oral prednisone.   Continuing to monitor closely.  Monitoring coags, LFTs, and lytes daily.  Weaning off morphine gtt given lack of needs and pain well controlled.  Monitoring closely with plans to discharge on 8/14 if tolerating oral medications, pain controlled, and no signs of bleeding.      ALL and BMT: High risk B cell ALL (CNS negative) + JAK2 activation.  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  - Completed apheresis collection in the [facility] (6/2020).   - He has been receiving bridging chemotherapy with oral 6MP, methotrexate and ruxolitinib. All oral chemotherapy stopped on 7/12.   - BM revealed 75% involvement with leukemia. CSF was negative.   - Outpatient lymphodepleting chemotherapy per [id number] with cytoxan and fludarabine 7/21-7/24 completed as an outpatient  - Awaiting count recovery    [facility] infusion 7/27   - Pre-meds of Tylenol and Benadryl   - Flush completed     BMT Daily CARTOX Note (complete days 0-14 and with any subsequent CRS/neurotoxicity)    Date: August 12, 2020    Artemio Nino ([id number]) is a 20 year old year old male who received infusion on 7/27, currently day +17 of CAR-T cell therapy Kymriah    Vital Signs: BP 94/53   Pulse 57   Temp 98.4  F (36.9  C) (Oral)   Resp 16   Wt 60.5 kg (133 lb 6.1 oz)   SpO2 98%   BMI 19.64 kg/m      1) CRS Grading (based ONLY on parameters in box below)    Fever:   > 100.4    Blood pressure:   SBP >/= 90 (not hypotensive)    Oxygen saturation:    >/= 90% on room air (not hypoxic)    CRS Parameter Grade 1  Grade 2 Grade 3  Grade 4   Fever* Tm >= 100.4 degrees F Tm >= 100.4 degrees F Tm >= 100.4 degrees F Tm >= to 100.4  degrees F      With Either:  Hypotension (SBP <90) None Responsive to Fluids  Requiring 1  vasopressor (w/ or w/o vasopressin) Requiring multiple  vasopressors  (excluding  vasopressin)     And/or  Hypoxia (O2 sats <90% on room air) None Low-flow nasal  cannula or blow-by High-flow nasal  cannula, face mask,  non-rebreather mask,or Venturi mask  Requiring positive  pressure (CPAP,  BiPAP intubation and  mechanical  ventilation)     CRS Summary  Does patient have CRS? Yes, date of onset: 7/28/20, initial grade 2  Current CRS ndgndrndanddndend:nd nd2nd What therapy was given:   - 7/28: IV Antibiotics  - 8/6: One 500 mL fluid bolus, dopamine 2 mcg/kg/min, increased dopamine to 7 mcg/kg/min. Gave 1 dose of Tociluzimab. IV fluids 100 mL/hr.  -8/7: 2nd dose of Tociluzimab  -8/8: 3rd dose of Tociluzimab and initiated Methylprednisolone  8/9-10 Continue methylprednisolone Q12H  - 8/11: plan to wean methylpred to 0.5 mg/kg Q12h pending symptoms and vasopressor needs  - 8/12: vasopressors off, afebrile, intermittent bradycardia  Has CRS grade changed?  Yes, 8/6: increased from grade 2 to grade 3, he is currently grade 1  Has CRS resolved?  Yes    2) Neurotoxicity:  Isael continues to report neck pain with associated headache (first noted on 8/6 with significant worsening over 48 hours and now improving) and vomiting. He has full range of motion in his neck, no nuchal rigidity and denies sensitivity to light.  Morphine and Tylenol have been ineffective at controlling neck pain/headache. Head CT performed 8/7 showing no intracranial pathology.  The neck pain continues to improve and now has resolved since 8/12 afternoon.      Isael does not appear to have any symptoms of encephalopathy, such as: altered mental status, confusion, acting out of character, nor has he had trouble swallowing or speaking, nor involuntary eye or muscle movements.     ICE  Assessment  Orientation to year, month, city, hospital: 4 points, Name 3 objects (e.g., point to clock, pen, button): 3 point, Following commands: (e.g., Show me 2 fingers): 1 point, Write a standard sentence (e.g., The pickett jumped over the log): 1 point and Count backwards from 100 by ten: 1 point  Total points: 10: No impairment    ASTCT ICANS Consensus Grading for Adults  ICE Score   10    Seizure   No seizures    Motor Findings   No motor findings    Elevated ICP/Cerebral Edema NA      Neurotoxicity  Domain Grade 1 Grade 2 Grade 3 Grade 4   ICE score 7-9 3-6 1-2 0   Depressed LOC      Awakens  spontaneously Awakens to  voice  Awakens only to  tactile stimulation Unarousable or  requires vigorous  or repetitive  tactile stimuli to  arouse. Stupor  or coma.   Seizure n/a n/a Any clinical  seizure focal or  generalized that  resolves rapidly  or nonconvulsive  seizures on EEG  that resolve with  intervention  Life-threatening  prolonged  seizure (>5 min);  or Repetitive  clinical or  electrical  seizures without  return to baseline  in between    Motor Findings      n/a n/a n/a Deep focal motor  weakness such  as hemiparesis  or paraparesis   Elevated  ICP/cerebral  edema  n/a n/a Focal/local  edema on  neuroimaging  Diffuse cerebral  edema on  neuroimaging;  decerebrate or  decorticate  posturing; or  cranial nerve VI  palsy; or  papilledema; or  Cushing's triad     ICANS grade is determined by the most severe event (ICE score, level of consciousness, seizure, motor findings, raised ICP/cerebral edema) not attributable to any other cause; for example, a patient with an ICE score of 3 who has a generalized seizure is classified as grade 3 ICANS.    A patient with an ICE score of 0 may be classified as grade 3 ICANS if awake with global aphasia, but a patient with an ICE score of 0 may be classified as grade 4 ICANS if unarousable.     Depressed level of consciousness should be attributable to no other cause (eg, no  "sedating medication)    Tremors and myoclonus associated with immune effector cell therapies may be graded according to CTCAE v5.0,but they do not influence ICANS grading.     Intracranial hemorrhage with or without associated edema is not considered a neurotoxicity feature and is  excluded from ICANS grading. It may be graded according to CTCAE v5.0.        Neurotoxicity Summary  Does patient have neurotoxicity? No  Current Neurotoxicity score (0-10): 10  What therapy was given: Yes, methylprednisolone transitioned to prednisone on 8/12  Has neurotoxicity grade changed? Yes  Has neurotoxicity resolved? Yes    3) Organ CAR-T toxicity:    Cardiac   No current vasopressor needs    Respiratory   Cough (8/6-8/8). No associated increased work of breathing, hypoxia or need for supplemental oxygen.  Now resolved.     Gastrointestinal   Nausea/vomiting, stable    Hepatic    none    Skin   none     Coagulopathy    Last checked 8/13: INR 1.24, PTT 30, fibrinogen 95    Other:     4) HLH   Ferritin > 10,000 plus any TWO of the following: NA     * CTCAE grading can be found at   https://ctep.cancer.gov/protocoldevelopment/electronic_applications/docs/CTCAE_v5_Quick Reference_8.5x11.pdf    FEN/Renal:   # Risk for malnutrition: Continues to drink some, not eating.  - Continue nutritional support with TPN/SMOF lipids    - Monitor nutritional intake     # Risk for electrolyte abnormalities:  - Check daily electrolytes     # Risk for renal dysfunction/risk for ELIZ secondary to potential tumor lysis with preparatory chemotherapy: GFR 133ml/min  - Daily electrolyte monitoring  - PRN lasix 10 mg IV (8/11-12) given increased IV fluids related to cryoprecipitate   - Monitor daily weights     # Risk for TLS: Uric Acid 2.2; if above 2.5 consider starting allopurinol      Pulmonary:  # Risk for pulmonary insufficiency: No current concerns. Normal chest and sinus CTs during work-up.     # Cough, resolved  Isael reported his \"lungs hurt\" a few " days ago. No issues today. He has experiencing an intermittent cough over the past few days, which was worsened throughout the night, but now improved/resolved this morning.   - Monitoring closely     Cardiovascular:  # Hypotension, resolved  #Asymptomatic bradycardia  Continued on Dopamine, titrating as indicated. Currently at 4 mcg/kg/minute with plans to wean off today as tolerated.  Titrated off dopamine on 8/11.   EKG on 8/12 sinus bradycardia.    - monitoring closely    # Aaron-Parkinson-White Syndrome: diagnosed upon syncope in 02/2018. Asymptomatic, no interventions to date. No current concerns.  - EKG 7/15 revealed WPW with normal sinus rhythm  - Echocardiogram 7/15 showed LVEF of 62%     Heme:   # Pancytopenia secondary to chemotherapy:  - Transfuse for hemoglobin < 7, platelets < 20,000, platelet parameter increased 8/6 due to mild epistaxis & hematemesis with streaks of blood. No reports in the past 24 hours.    -  Mild hives noted at end of pRBC transfusion on 7/25. Premedicate with Tylenol and Benadryl for pRBC transfusions.   - Recent history of significant hives with platelet transfusions. Was being premedicated with hydrocortisone, tylenol, and benadryl for platelets. However, no hydrocortisone premedication ordered now given CAR-T.   - Isael reported he had a few hives on his leg with recent platelet transfusion , non pruritic, self resolved, did not report to nursing.  - No GCSF per protocol    #Coagulopathy related to CRS  No concern for DIC currently.  No active signs of bleeding.  Cyro 8/9-11  - trending daily fibrinogen, PTT, INR  - repeat ferritin level on 8/12 elevated in ~70k     Infectious Disease:  # Recent Febrile Neutropenia   Admitted for fever. Spiking fevers, thought related to CRS, although fever curve improved in the past 24 hours.  S/p vancomycin.  Blood cultures have remained negative or NGTD.  Afebrile the last 48 hours.  Covid negative 7/17 and 7/24.  Discotinued cefepime on  8/12.    - monitoring  - continue PO levaquin while neutropenic     # Risk for infection given immunocompromised status:    Active: See above  Prophylaxis:                                                                      - Viral prophylaxis: HD Valtrex continue through Day +30  - Fungal prophylaxis: Micafungin continue through Day +30. History of photoxic drug eruption with voriconazole, avoid use.   - Bacterial prophylaxis: Cefepime as noted above.  - PCP prophylaxis: Bactrim to begin day +28      GI:   # Nausea, stable after starting scheduled zofran  - Scheduled medications: Schedule Zofran q6H, switched to PO  - PRN medications: Benadryl, Ativan     # Acid reflux:    - continue Protonix  BID  - TUMS available PRN     #Direct hyperbilirubinemia, improving  # Transaminitis, stable    Possibly related to recent chemotherapy vs shock liver + CRS.  No elevation of alk phos to suggest common bile duct involvement.  No abdominal tenderness to palpation on examination or hepatomegaly.  DARIO with dopplers r/o obstruction and VOD; noted sludge with minimal pericholecystic fluid, negative sonographic sign.    - trending LFTs and bilirubin  - continue SMOF lipids and ursodiol 600 mg TID     Neuro:  # Neck pain/Headache, resolving  Severe neck pain with associated headache most likely related to neurotoxicity.   Currently on Methylprednisolone q12 hours.  Significantly improving last 24 hours, now completely resolved since 8/12.    - weaning morphine gtt with PCA, monitoring for side effects  - See Neurotoxicity above for more detail     Oral Hygiene:   # Receding gums, risk for infection  - Continue chlorhexidine mouth wash for symptomatic control, pain now improved     Derm:  No current rash. Monitoring     Fertility:  Sperm collected pre chemotherapy for fertility preservation    Discharge Considerations: Expected lengths of hospitalization for patients with complications from stem cell transplant vary based on the  complication(s) and severity(ies). A typical stay is 7-14 days.    The above plan of care was developed by and communicated to me by the Pediatric BMT attending physician, Dr. Viky Zavala.      John Núñez MD  Peds BMT Hospitalist    Pediatric BMT Inpatient Attending Note:     Artemio was seen and evaluated by me today.       The significant interval history includes: Isael continues to report improvement in neck pain and headache.  He had some nausea last evening with emesis.    I counseled them for the followin yo male with very high risk ALL s/p matched sibling BMT who relapsed 1.5 years post BMT and is now S/P Kymriah infusion.  He had both CRS and neurotoxicity- both of which he is recovering from.   He is at risk of CRS- resolving; at risk of neurotoxicity- wean steroids to daily; at risk of malnutrition- continue TPN; at risk of infections- continue ppx; at risk of nausea- will try zofran.  We will look at his pill schedule and see if we can adjust when he takes his medications to ameliorate his nausea.      I have reviewed the vital signs, medications and lab results.  I assisted in formulating a plan, which was instituted by the BMT team. The total amount of time spent in the admission and care of Artemio Nino today was 35 minutes, at least 50% of which was counseling and coordination of care.     Viky Zavala MD, PhD    Pediatric Blood and Marrow Transplant  [facility]      Patient Active Problem List   Diagnosis     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)     Aaron-Parkinson-White (WPW) syndrome     Bone marrow transplant candidate     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)     At risk for infection     S/P allogeneic bone marrow transplant (H)     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

## 2020-08-14 ENCOUNTER — HOSPITAL ENCOUNTER (OUTPATIENT)
Facility: CLINIC | Age: 20
End: 2020-08-14
Attending: PHYSICIAN ASSISTANT
Payer: COMMERCIAL

## 2020-08-14 ENCOUNTER — HOME INFUSION (PRE-WILLOW HOME INFUSION) (OUTPATIENT)
Dept: PHARMACY | Facility: CLINIC | Age: 20
End: 2020-08-14

## 2020-08-14 VITALS
BODY MASS INDEX: 19.38 KG/M2 | SYSTOLIC BLOOD PRESSURE: 93 MMHG | TEMPERATURE: 98.6 F | HEART RATE: 72 BPM | RESPIRATION RATE: 20 BRPM | OXYGEN SATURATION: 98 % | WEIGHT: 131.61 LBS | DIASTOLIC BLOOD PRESSURE: 53 MMHG

## 2020-08-14 DIAGNOSIS — Z11.59 ENCOUNTER FOR SCREENING FOR OTHER VIRAL DISEASES: Primary | ICD-10-CM

## 2020-08-14 LAB
ABO + RH BLD: NORMAL
ABO + RH BLD: NORMAL
ALBUMIN SERPL-MCNC: 2.8 G/DL (ref 3.4–5)
ALP SERPL-CCNC: 121 U/L (ref 40–150)
ALT SERPL W P-5'-P-CCNC: 424 U/L (ref 0–70)
ANION GAP SERPL CALCULATED.3IONS-SCNC: 5 MMOL/L (ref 3–14)
AST SERPL W P-5'-P-CCNC: 106 U/L (ref 0–45)
BACTERIA SPEC CULT: NO GROWTH
BACTERIA SPEC CULT: NO GROWTH
BASOPHILS # BLD AUTO: 0 10E9/L (ref 0–0.2)
BASOPHILS NFR BLD AUTO: 0 %
BILIRUB SERPL-MCNC: 1.6 MG/DL (ref 0.2–1.3)
BLD GP AB SCN SERPL QL: NORMAL
BLD PROD TYP BPU: NORMAL
BLD PROD TYP BPU: NORMAL
BLD UNIT ID BPU: 0
BLOOD BANK CMNT PATIENT-IMP: NORMAL
BLOOD PRODUCT CODE: NORMAL
BPU ID: NORMAL
BUN SERPL-MCNC: 21 MG/DL (ref 7–30)
CA-I BLD-MCNC: 4.8 MG/DL (ref 4.4–5.2)
CALCIUM SERPL-MCNC: 8.1 MG/DL (ref 8.5–10.1)
CHLORIDE SERPL-SCNC: 107 MMOL/L (ref 94–109)
CO2 SERPL-SCNC: 29 MMOL/L (ref 20–32)
CREAT SERPL-MCNC: 0.44 MG/DL (ref 0.66–1.25)
DIFFERENTIAL METHOD BLD: ABNORMAL
EOSINOPHIL # BLD AUTO: 0 10E9/L (ref 0–0.7)
EOSINOPHIL NFR BLD AUTO: 0 %
ERYTHROCYTE [DISTWIDTH] IN BLOOD BY AUTOMATED COUNT: 14.2 % (ref 10–15)
FERRITIN SERPL-MCNC: ABNORMAL NG/ML (ref 26–388)
GFR SERPL CREATININE-BSD FRML MDRD: >90 ML/MIN/{1.73_M2}
GLUCOSE SERPL-MCNC: 133 MG/DL (ref 70–99)
HCT VFR BLD AUTO: 24.7 % (ref 40–53)
HGB BLD-MCNC: 8.5 G/DL (ref 13.3–17.7)
LYMPHOCYTES # BLD AUTO: 0.4 10E9/L (ref 0.8–5.3)
LYMPHOCYTES NFR BLD AUTO: 83.7 %
Lab: NORMAL
Lab: NORMAL
MAGNESIUM SERPL-MCNC: 2 MG/DL (ref 1.6–2.3)
MCH RBC QN AUTO: 29.5 PG (ref 26.5–33)
MCHC RBC AUTO-ENTMCNC: 34.4 G/DL (ref 31.5–36.5)
MCV RBC AUTO: 86 FL (ref 78–100)
MONOCYTES # BLD AUTO: 0 10E9/L (ref 0–1.3)
MONOCYTES NFR BLD AUTO: 1.2 %
NEUTROPHILS # BLD AUTO: 0.1 10E9/L (ref 1.6–8.3)
NEUTROPHILS NFR BLD AUTO: 15.1 %
NRBC # BLD AUTO: 0 10*3/UL
NRBC BLD AUTO-RTO: 2 /100
NUM BPU REQUESTED: 1
PHOSPHATE SERPL-MCNC: 4.2 MG/DL (ref 2.5–4.5)
PLATELET # BLD AUTO: 20 10E9/L (ref 150–450)
PLATELET # BLD EST: ABNORMAL 10*3/UL
POTASSIUM SERPL-SCNC: 3.3 MMOL/L (ref 3.4–5.3)
PROT SERPL-MCNC: 6.1 G/DL (ref 6.8–8.8)
RBC # BLD AUTO: 2.88 10E12/L (ref 4.4–5.9)
RBC MORPH BLD: NORMAL
SODIUM SERPL-SCNC: 141 MMOL/L (ref 133–144)
SPECIMEN EXP DATE BLD: NORMAL
SPECIMEN SOURCE: NORMAL
SPECIMEN SOURCE: NORMAL
TRANSFUSION STATUS PATIENT QL: NORMAL
TRANSFUSION STATUS PATIENT QL: NORMAL
WBC # BLD AUTO: 0.5 10E9/L (ref 4–11)

## 2020-08-14 PROCEDURE — 85025 COMPLETE CBC W/AUTO DIFF WBC: CPT | Performed by: STUDENT IN AN ORGANIZED HEALTH CARE EDUCATION/TRAINING PROGRAM

## 2020-08-14 PROCEDURE — 25000128 H RX IP 250 OP 636: Performed by: STUDENT IN AN ORGANIZED HEALTH CARE EDUCATION/TRAINING PROGRAM

## 2020-08-14 PROCEDURE — 25000131 ZZH RX MED GY IP 250 OP 636 PS 637: Performed by: STUDENT IN AN ORGANIZED HEALTH CARE EDUCATION/TRAINING PROGRAM

## 2020-08-14 PROCEDURE — 83735 ASSAY OF MAGNESIUM: CPT | Performed by: STUDENT IN AN ORGANIZED HEALTH CARE EDUCATION/TRAINING PROGRAM

## 2020-08-14 PROCEDURE — 25000128 H RX IP 250 OP 636: Performed by: PEDIATRICS

## 2020-08-14 PROCEDURE — 82728 ASSAY OF FERRITIN: CPT | Performed by: PEDIATRICS

## 2020-08-14 PROCEDURE — 82330 ASSAY OF CALCIUM: CPT | Performed by: NURSE PRACTITIONER

## 2020-08-14 PROCEDURE — 86901 BLOOD TYPING SEROLOGIC RH(D): CPT | Performed by: STUDENT IN AN ORGANIZED HEALTH CARE EDUCATION/TRAINING PROGRAM

## 2020-08-14 PROCEDURE — P9037 PLATE PHERES LEUKOREDU IRRAD: HCPCS | Performed by: NURSE PRACTITIONER

## 2020-08-14 PROCEDURE — 25000132 ZZH RX MED GY IP 250 OP 250 PS 637: Performed by: STUDENT IN AN ORGANIZED HEALTH CARE EDUCATION/TRAINING PROGRAM

## 2020-08-14 PROCEDURE — 86900 BLOOD TYPING SEROLOGIC ABO: CPT | Performed by: STUDENT IN AN ORGANIZED HEALTH CARE EDUCATION/TRAINING PROGRAM

## 2020-08-14 PROCEDURE — 86850 RBC ANTIBODY SCREEN: CPT | Performed by: STUDENT IN AN ORGANIZED HEALTH CARE EDUCATION/TRAINING PROGRAM

## 2020-08-14 PROCEDURE — 25000132 ZZH RX MED GY IP 250 OP 250 PS 637: Performed by: PEDIATRICS

## 2020-08-14 PROCEDURE — 84100 ASSAY OF PHOSPHORUS: CPT | Performed by: STUDENT IN AN ORGANIZED HEALTH CARE EDUCATION/TRAINING PROGRAM

## 2020-08-14 PROCEDURE — 80053 COMPREHEN METABOLIC PANEL: CPT | Performed by: STUDENT IN AN ORGANIZED HEALTH CARE EDUCATION/TRAINING PROGRAM

## 2020-08-14 RX ORDER — HEPARIN SODIUM,PORCINE 10 UNIT/ML
5 VIAL (ML) INTRAVENOUS
Status: CANCELLED
Start: 2020-08-14

## 2020-08-14 RX ORDER — PREDNISONE 20 MG/1
20 TABLET ORAL DAILY
Qty: 7 TABLET | Refills: 0 | Status: SHIPPED | OUTPATIENT
Start: 2020-08-15 | End: 2020-08-18

## 2020-08-14 RX ORDER — HEPARIN SODIUM (PORCINE) LOCK FLUSH IV SOLN 100 UNIT/ML 100 UNIT/ML
3 SOLUTION INTRAVENOUS
Status: CANCELLED
Start: 2020-08-14

## 2020-08-14 RX ORDER — DIPHENHYDRAMINE HYDROCHLORIDE 50 MG/ML
50 INJECTION INTRAMUSCULAR; INTRAVENOUS EVERY 6 HOURS PRN
Status: CANCELLED
Start: 2020-08-14

## 2020-08-14 RX ORDER — SULFAMETHOXAZOLE/TRIMETHOPRIM 800-160 MG
1 TABLET ORAL
Start: 2020-08-24 | End: 2020-08-26

## 2020-08-14 RX ORDER — HEPARIN SODIUM,PORCINE 10 UNIT/ML
5 VIAL (ML) INTRAVENOUS
Status: CANCELLED | OUTPATIENT
Start: 2020-08-14

## 2020-08-14 RX ORDER — ACETAMINOPHEN 325 MG/1
650 TABLET ORAL ONCE
Status: CANCELLED
Start: 2020-08-14

## 2020-08-14 RX ADMIN — LEVOFLOXACIN 500 MG: 500 TABLET, FILM COATED ORAL at 08:19

## 2020-08-14 RX ADMIN — ACETAMINOPHEN 975 MG: 325 TABLET, FILM COATED ORAL at 08:22

## 2020-08-14 RX ADMIN — DIPHENHYDRAMINE HYDROCHLORIDE 50 MG: 50 INJECTION, SOLUTION INTRAMUSCULAR; INTRAVENOUS at 08:22

## 2020-08-14 RX ADMIN — HEPARIN, PORCINE (PF) 10 UNIT/ML INTRAVENOUS SYRINGE 5 ML: at 11:22

## 2020-08-14 RX ADMIN — URSODIOL 600 MG: 300 CAPSULE ORAL at 08:20

## 2020-08-14 RX ADMIN — ONDANSETRON 4 MG: 4 TABLET, ORALLY DISINTEGRATING ORAL at 08:21

## 2020-08-14 RX ADMIN — VALACYCLOVIR HYDROCHLORIDE 1000 MG: 500 TABLET, FILM COATED ORAL at 08:19

## 2020-08-14 RX ADMIN — ONDANSETRON 4 MG: 4 TABLET, ORALLY DISINTEGRATING ORAL at 04:18

## 2020-08-14 RX ADMIN — PANTOPRAZOLE SODIUM 40 MG: 40 TABLET, DELAYED RELEASE ORAL at 08:19

## 2020-08-14 RX ADMIN — PREDNISONE 20 MG: 20 TABLET ORAL at 08:19

## 2020-08-14 NOTE — PHARMACY-CONSULT NOTE
Discharge TPN Monitoring    Isael's TPN formula, labs, and nutritional status were reviewed today.       Discussed with Candido Morales MD and communicated to [facility].     Isael will return to clinic on Monday 8/17 for labs and reassessment.    The following reflects the current TPN formula:  Dosing Weight: 58.4 kg  Volume: 1200 ml, cycled over 12 hours  Protein: 88 g/day   Dextrose: 210 g  SMOF Lipids: 250 mL    Sodium: 45 mEq/day  Potassium:   18 mEq/day  Calcium: 10 mEq/day  Magnesium:  5 mEq/day  Phosphorus:  35 mmol/day  Chloride:Acetate ratio: 1:2 (best max chloride)  Trace elements with Selenium: standard  Multivitamins: standard  Vitamin K:  10 mg    Pharmacy will continue to follow.  Erica May, PharmD

## 2020-08-14 NOTE — SUMMARY OF CARE
BMT Pediatric Summary of Care     August 14, 2020 9:52 AM  Artemio Nino  MRN: [id number]     Discharge Date: 8/14/2020     BMT Primary Physician: Dr. Viky Zavala     BMT Nurse Coordinator: Daria Jara     Discharge Diagnosis: S/P admission for [facility]     Discharge To: local residence     Activity: As tolerated.  Continue Kymriah precautions (wear an N95 mask when out in public, avoid [place] with high pedestrian traffic, etc.)     Catheter Care: Galvan, Port-a-Cath     Vascular Access Device Protocol Per Policy  Supplies through Home [facility] (Please supply central line dressing kits for weekly dressing changes).  [facility]  Fax: [phone]  Ph: [phone]         IV Medications through home infusion: None     Nutrition: TPN/IL-see separate orders for formula     Blood Transfusions:  Transfuse if Hemoglobin < or equal 7 mg/dL  Transfuse if Platelet count < or equal 20,000 uL  Transfusion Pre-meds:  Benadryl  0.5-1mg/kg PO x 1 pre-transfusion   Tylenol 10 mg/kg/dose PO x 1 pre-transfusion      Outpatient Pharmacy:  None     Laboratory Tests:  At next clinic appointment (date: 8/17/2020 at 1:30 PM)  Hemogram (CBC) differential, platelet count  Basic Metabolic Panel  Magnesium  Phosphorus  INR  PTT  Fibrinogen  LDH  Hepatic Panel  Ferritin  D-Dimer     Support Services:  None     Appointments:   BMT Clinic (date, time, provider):   8/17/2020 at 2:30 PM with 1:30 labs  Gabriella Morales DO

## 2020-08-14 NOTE — PROGRESS NOTES
Infusion Therapy-[facility]-Nurse Liaison-Current [facility] patient    Pt has been on service with [facility] prior to this hospital stay.  Pt is expected to discharge and Resume care with [facility].    DISCHARGE THERAPY: clc TPN  CVC: JEFF SANCHEZ  SUPPLIES/DELIVERY: to LOCAL HOT   SNV: not required today for education, mom and pt independent with TPN infusons  AGENCY: [facility]  NOTE: Per Mom, No changes to demographics, insurance, allergies, etc.  Discharge Plan: Mom verbalizes understanding, and knows how to contact  [facility], also understands we have an RN/RPH on call 24/7. Will continue to follow until dc for any changes or additional needs      Carline Boyer, [facility]-Nurse Liaison  Cell:  [phone] [email or web address] 24 hrs phone [phone]

## 2020-08-14 NOTE — DISCHARGE INSTRUCTIONS
BMT Pediatric Summary of Care    August 14, 2020 9:52 AM  Artemio Nino  MRN: [id number]    Discharge Date: 8/14/2020    BMT Primary Physician: Dr. Viky Zavala    BMT Nurse Coordinator: Daria Jara    Discharge Diagnosis: S/P admission for [facility]    Discharge To: local residence    Activity: As tolerated.  Continue Kymriah precautions (wear an N95 mask when out in public, avoid [place] with high pedestrian traffic, etc.)    Catheter Care: Galvan, Port-a-Cath    Vascular Access Device Protocol Per Policy  Supplies through [facility] (Please supply central line dressing kits for weekly dressing changes).  [facility]  Fax: [phone]  Ph: [phone]       IV Medications through home infusion: None    Nutrition: TPN/IL-see separate orders for formula    Blood Transfusions:  Transfuse if Hemoglobin < or equal 7 mg/dL  Transfuse if Platelet count < or equal 20,000 uL  Transfusion Pre-meds:  Benadryl  0.5-1mg/kg PO x 1 pre-transfusion   Tylenol 10 mg/kg/dose PO x 1 pre-transfusion     Outpatient Pharmacy:  None    Laboratory Tests:  At next clinic appointment (date: 8/17/2020 at 1:30 PM)  Hemogram (CBC) differential, platelet count  Basic Metabolic Panel  Magnesium  Phosphorus  INR  PTT  Fibrinogen  LDH  Hepatic Panel  Ferritin  D-Dimer    Support Services:  None    Appointments:   BMT Clinic (date, time, provider):   8/17/2020 at 2:30 PM with 1:30 labs  Gabriella Morales DO

## 2020-08-14 NOTE — PLAN OF CARE
[id number]: Afebrile. VSS. Lung sounds clear, diminished in bases. Pt denies any pain. PRN Ativan given x1 for nausea. Adequate urine output, no stool. Shower completed. Mom at the bedside, attentive to pt. Pt comfortable laying in bed. Hourly rounding completed. Will continue to monitor and notify as needed.

## 2020-08-14 NOTE — PLAN OF CARE
Afebrile, vitals stable. Taking meds well. Eating breakfast with no nausea/emesis. Discharge meds discussed and stated understanding. Will be seen in the clinic on Monday. No issues and discharged to home accompanied by step mom.

## 2020-08-14 NOTE — PHARMACY - DISCHARGE MEDICATION RECONCILIATION AND EDUCATION
Discharge medication review for this patient completed.  Pharmacist provided medication teaching for discharge with a focus on new medications/dose changes.  The discharge medication list was reviewed with Laine Ulloa and the following points were discussed, as applicable: Name, description, purpose, dose/strength, duration of medications, strategies for giving medications to children, common side effects, food/medications to avoid and when to call MD.    Both were engaged during teaching and verbalized understanding. Other pertinent information from teaching includes: Provided Medactionplan with stephane and instructed them they can keep up-to-date.  Both stated they would not use the Peridex soln after discharge.    All medications in hand during teach except prednisone due to ordered after other meds and on way up.  RN aware. Medication(s) left with family in patient room per RN request.    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)      ursodiol (ACTIGALL) 300 MG capsule Take 2 capsules (600 mg) by mouth 3 times daily  Qty: 180 capsule, Refills: 2    Associated Diagnoses: 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)         CONTINUE these medications which have CHANGED    Details   levofloxacin (LEVAQUIN) 500 MG tablet Take 1 tablet (500 mg) by mouth daily  Qty: 30 tablet, Refills: 0    Associated Diagnoses: 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)      LORazepam (ATIVAN) 0.5 MG tablet Take 1 tablet (0.5 mg) by mouth every 6 hours as needed for nausea  Qty:  20 tablet, Refills: 0    Associated Diagnoses: ALL (acute lymphoid leukemia) in relapse (H)      pantoprazole (PROTONIX) 40 MG EC tablet Take 1 tablet (40 mg) by mouth 2 times daily (before meals)  Qty: 60 tablet, Refills: 2    Associated Diagnoses: 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)      sulfamethoxazole-trimethoprim (BACTRIM DS) 800-160 MG tablet Take 1 tablet by mouth Every Mon, Tues two times daily    Associated Diagnoses: 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)      melatonin 5 MG tablet Take 5-10 mg by mouth nightly as needed for sleep         STOP taking these medications       allopurinol (ZYLOPRIM) 300 MG tablet Comments:   Reason for Stopping:         diphenhydrAMINE (BENADRYL) 50 MG capsule Comments:   Reason for Stopping:         granisetron (KYTRIL) 1 MG tablet Comments:   Reason for Stopping:         micafungin (MYCAMINE) 50 MG injection Comments:   Reason for Stopping:         oxyCODONE (ROXICODONE) 5 MG tablet Comments:   Reason for Stopping:         Parenteral Nutrition Comments:   Reason for Stopping:         traMADol (ULTRAM) 50 MG tablet Comments:   Reason for Stopping:

## 2020-08-14 NOTE — PLAN OF CARE
Patient has been afebrile and other vitals stable.  Heart rate range was 60-72.  Offered no complaints of pain or nausea.  Hourly rounding completed.

## 2020-08-15 ENCOUNTER — INFUSION THERAPY VISIT (OUTPATIENT)
Dept: INFUSION THERAPY | Facility: CLINIC | Age: 20
End: 2020-08-15
Attending: NURSE PRACTITIONER
Payer: COMMERCIAL

## 2020-08-15 DIAGNOSIS — C91.02 ACUTE LYMPHOBLASTIC LEUKEMIA (ALL) IN RELAPSE (H): Primary | ICD-10-CM

## 2020-08-15 LAB
BACTERIA SPEC CULT: NO GROWTH
BACTERIA SPEC CULT: NO GROWTH
Lab: NORMAL
SPECIMEN SOURCE: NORMAL
YEAST SPEC QL CULT: NO GROWTH
YEAST SPEC QL CULT: NO GROWTH

## 2020-08-15 PROCEDURE — 25000128 H RX IP 250 OP 636: Mod: ZF | Performed by: NURSE PRACTITIONER

## 2020-08-15 PROCEDURE — 96523 IRRIG DRUG DELIVERY DEVICE: CPT

## 2020-08-15 RX ORDER — HEPARIN SODIUM,PORCINE 10 UNIT/ML
2-4 VIAL (ML) INTRAVENOUS EVERY 24 HOURS
Status: DISCONTINUED | OUTPATIENT
Start: 2020-08-15 | End: 2020-08-15 | Stop reason: HOSPADM

## 2020-08-15 RX ORDER — HEPARIN SODIUM,PORCINE 10 UNIT/ML
2-4 VIAL (ML) INTRAVENOUS
Status: DISCONTINUED | OUTPATIENT
Start: 2020-08-15 | End: 2020-08-15 | Stop reason: HOSPADM

## 2020-08-15 RX ORDER — HEPARIN SODIUM,PORCINE 10 UNIT/ML
VIAL (ML) INTRAVENOUS
Status: DISCONTINUED
Start: 2020-08-15 | End: 2020-08-15 | Stop reason: HOSPADM

## 2020-08-15 RX ADMIN — HEPARIN, PORCINE (PF) 10 UNIT/ML INTRAVENOUS SYRINGE 5 ML: at 09:28

## 2020-08-15 RX ADMIN — HEPARIN, PORCINE (PF) 10 UNIT/ML INTRAVENOUS SYRINGE 5 ML: at 09:27

## 2020-08-15 NOTE — PROGRESS NOTES
Infusion Nursing Note    Artemio Nino Presents to [facility] today for: CVC Dressing Change    Intravenous Access/Labs: No labs for today    Infusion Note: Pt's mom called clinic this AM for CVC dressing change; pt added onto infusion schedule. Pt denies any new issues/complaints. Double Galvan dressing change completed without issues using sterile technique; site WDL. Caps changed and both lumens heparin locked per pt's request.     Discharge Plan:   Patient and mother verbalized understanding of discharge instructions.  RN reviewed that pt should return to clinic on 8/17/2020, as scheduled.  Pt left [facility] in stable condition.

## 2020-08-17 ENCOUNTER — INFUSION THERAPY VISIT (OUTPATIENT)
Dept: INFUSION THERAPY | Facility: CLINIC | Age: 20
End: 2020-08-17
Attending: PEDIATRICS
Payer: COMMERCIAL

## 2020-08-17 ENCOUNTER — ONCOLOGY VISIT (OUTPATIENT)
Dept: TRANSPLANT | Facility: CLINIC | Age: 20
End: 2020-08-17
Attending: NURSE PRACTITIONER
Payer: COMMERCIAL

## 2020-08-17 ENCOUNTER — HOME INFUSION (PRE-WILLOW HOME INFUSION) (OUTPATIENT)
Dept: PHARMACY | Facility: CLINIC | Age: 20
End: 2020-08-17

## 2020-08-17 VITALS
HEIGHT: 69 IN | HEART RATE: 74 BPM | OXYGEN SATURATION: 99 % | SYSTOLIC BLOOD PRESSURE: 106 MMHG | RESPIRATION RATE: 16 BRPM | WEIGHT: 133.6 LBS | TEMPERATURE: 98.9 F | DIASTOLIC BLOOD PRESSURE: 61 MMHG | BODY MASS INDEX: 19.79 KG/M2

## 2020-08-17 DIAGNOSIS — C91.02 ACUTE LYMPHOBLASTIC LEUKEMIA (ALL) IN RELAPSE (H): Primary | ICD-10-CM

## 2020-08-17 DIAGNOSIS — C91.00 ALL (ACUTE LYMPHOBLASTIC LEUKEMIA OF INFANT) (H): ICD-10-CM

## 2020-08-17 DIAGNOSIS — C91.01 ACUTE LYMPHOBLASTIC LEUKEMIA (ALL) IN REMISSION (H): ICD-10-CM

## 2020-08-17 DIAGNOSIS — Z94.81 S/P ALLOGENEIC BONE MARROW TRANSPLANT (H): ICD-10-CM

## 2020-08-17 LAB
ALBUMIN SERPL-MCNC: 3.4 G/DL (ref 3.4–5)
ALP SERPL-CCNC: 105 U/L (ref 40–150)
ALT SERPL W P-5'-P-CCNC: 236 U/L (ref 0–70)
ANION GAP SERPL CALCULATED.3IONS-SCNC: 6 MMOL/L (ref 3–14)
APTT BLD: 26 SEC (ref 22–37)
APTT PPP: >240 SEC (ref 22–37)
AST SERPL W P-5'-P-CCNC: 46 U/L (ref 0–45)
BASOPHILS # BLD AUTO: 0 10E9/L (ref 0–0.2)
BASOPHILS NFR BLD AUTO: 0 %
BILIRUB SERPL-MCNC: 1.4 MG/DL (ref 0.2–1.3)
BLD PROD TYP BPU: NORMAL
BLD PROD TYP BPU: NORMAL
BLD UNIT ID BPU: 0
BLOOD PRODUCT CODE: NORMAL
BPU ID: NORMAL
BUN SERPL-MCNC: 23 MG/DL (ref 7–30)
CALCIUM SERPL-MCNC: 8.6 MG/DL (ref 8.5–10.1)
CHLORIDE SERPL-SCNC: 106 MMOL/L (ref 94–109)
CO2 SERPL-SCNC: 27 MMOL/L (ref 20–32)
CREAT SERPL-MCNC: 0.52 MG/DL (ref 0.66–1.25)
CRP SERPL-MCNC: <2.9 MG/L (ref 0–8)
D DIMER PPP FEU-MCNC: 1.5 UG/ML FEU (ref 0–0.5)
DIFFERENTIAL METHOD BLD: ABNORMAL
EOSINOPHIL # BLD AUTO: 0 10E9/L (ref 0–0.7)
EOSINOPHIL NFR BLD AUTO: 0 %
ERYTHROCYTE [DISTWIDTH] IN BLOOD BY AUTOMATED COUNT: 14.3 % (ref 10–15)
FERRITIN SERPL-MCNC: 4311 NG/ML (ref 26–388)
FIBRINOGEN PPP-MCNC: 140 MG/DL (ref 200–420)
GFR SERPL CREATININE-BSD FRML MDRD: >90 ML/MIN/{1.73_M2}
GLUCOSE SERPL-MCNC: 148 MG/DL (ref 70–99)
HCT VFR BLD AUTO: 25.7 % (ref 40–53)
HGB BLD-MCNC: 8.6 G/DL (ref 13.3–17.7)
INR PPP: 1.23 (ref 0.86–1.14)
LDH SERPL L TO P-CCNC: 262 U/L (ref 85–227)
LYMPHOCYTES # BLD AUTO: 0.2 10E9/L (ref 0.8–5.3)
LYMPHOCYTES NFR BLD AUTO: 31.6 %
MAGNESIUM SERPL-MCNC: 1.8 MG/DL (ref 1.6–2.3)
MCH RBC QN AUTO: 28.7 PG (ref 26.5–33)
MCHC RBC AUTO-ENTMCNC: 33.5 G/DL (ref 31.5–36.5)
MCV RBC AUTO: 86 FL (ref 78–100)
METAMYELOCYTES # BLD: 0 10E9/L
METAMYELOCYTES NFR BLD MANUAL: 0.9 %
MONOCYTES # BLD AUTO: 0.1 10E9/L (ref 0–1.3)
MONOCYTES NFR BLD AUTO: 19.3 %
NEUTROPHILS # BLD AUTO: 0.3 10E9/L (ref 1.6–8.3)
NEUTROPHILS NFR BLD AUTO: 48.2 %
NRBC # BLD AUTO: 0.1 10*3/UL
NRBC BLD AUTO-RTO: 10 /100
NUM BPU REQUESTED: 1
PHOSPHATE SERPL-MCNC: 3.1 MG/DL (ref 2.5–4.5)
PLATELET # BLD AUTO: 17 10E9/L (ref 150–450)
PLATELET # BLD EST: ABNORMAL 10*3/UL
POTASSIUM SERPL-SCNC: 3.8 MMOL/L (ref 3.4–5.3)
PROT SERPL-MCNC: 6.7 G/DL (ref 6.8–8.8)
RBC # BLD AUTO: 3 10E12/L (ref 4.4–5.9)
RBC MORPH BLD: NORMAL
SODIUM SERPL-SCNC: 139 MMOL/L (ref 133–144)
TRANSFUSION STATUS PATIENT QL: NORMAL
TRANSFUSION STATUS PATIENT QL: NORMAL
URATE SERPL-MCNC: 1.9 MG/DL (ref 3.5–7.2)
WBC # BLD AUTO: 0.6 10E9/L (ref 4–11)

## 2020-08-17 PROCEDURE — 84550 ASSAY OF BLOOD/URIC ACID: CPT | Performed by: PEDIATRICS

## 2020-08-17 PROCEDURE — 25000128 H RX IP 250 OP 636: Mod: ZF | Performed by: PEDIATRICS

## 2020-08-17 PROCEDURE — 82728 ASSAY OF FERRITIN: CPT | Performed by: PEDIATRICS

## 2020-08-17 PROCEDURE — 25000132 ZZH RX MED GY IP 250 OP 250 PS 637: Mod: ZF

## 2020-08-17 PROCEDURE — 85610 PROTHROMBIN TIME: CPT | Performed by: PEDIATRICS

## 2020-08-17 PROCEDURE — 85730 THROMBOPLASTIN TIME PARTIAL: CPT | Performed by: PEDIATRICS

## 2020-08-17 PROCEDURE — 85384 FIBRINOGEN ACTIVITY: CPT | Performed by: PEDIATRICS

## 2020-08-17 PROCEDURE — 84100 ASSAY OF PHOSPHORUS: CPT | Performed by: PEDIATRICS

## 2020-08-17 PROCEDURE — 36430 TRANSFUSION BLD/BLD COMPNT: CPT

## 2020-08-17 PROCEDURE — 85730 THROMBOPLASTIN TIME PARTIAL: CPT | Mod: 91 | Performed by: PEDIATRICS

## 2020-08-17 PROCEDURE — 83735 ASSAY OF MAGNESIUM: CPT | Performed by: PEDIATRICS

## 2020-08-17 PROCEDURE — 85379 FIBRIN DEGRADATION QUANT: CPT | Performed by: PEDIATRICS

## 2020-08-17 PROCEDURE — 83615 LACTATE (LD) (LDH) ENZYME: CPT | Performed by: PEDIATRICS

## 2020-08-17 PROCEDURE — 80053 COMPREHEN METABOLIC PANEL: CPT | Performed by: PEDIATRICS

## 2020-08-17 PROCEDURE — 25000128 H RX IP 250 OP 636: Mod: ZF

## 2020-08-17 PROCEDURE — 86140 C-REACTIVE PROTEIN: CPT | Performed by: PEDIATRICS

## 2020-08-17 PROCEDURE — 85025 COMPLETE CBC W/AUTO DIFF WBC: CPT | Performed by: PEDIATRICS

## 2020-08-17 PROCEDURE — 96374 THER/PROPH/DIAG INJ IV PUSH: CPT

## 2020-08-17 PROCEDURE — P9037 PLATE PHERES LEUKOREDU IRRAD: HCPCS | Performed by: PHYSICIAN ASSISTANT

## 2020-08-17 RX ORDER — DIPHENHYDRAMINE HYDROCHLORIDE 50 MG/ML
50 INJECTION INTRAMUSCULAR; INTRAVENOUS EVERY 6 HOURS PRN
Status: DISCONTINUED | OUTPATIENT
Start: 2020-08-17 | End: 2020-08-17 | Stop reason: HOSPADM

## 2020-08-17 RX ORDER — HEPARIN SODIUM,PORCINE 10 UNIT/ML
2-4 VIAL (ML) INTRAVENOUS
Status: DISCONTINUED | OUTPATIENT
Start: 2020-08-17 | End: 2020-08-17 | Stop reason: HOSPADM

## 2020-08-17 RX ORDER — HEPARIN SODIUM (PORCINE) LOCK FLUSH IV SOLN 100 UNIT/ML 100 UNIT/ML
5 SOLUTION INTRAVENOUS
Status: DISCONTINUED | OUTPATIENT
Start: 2020-08-17 | End: 2020-08-17 | Stop reason: HOSPADM

## 2020-08-17 RX ORDER — ACETAMINOPHEN 325 MG/1
650 TABLET ORAL ONCE
Status: COMPLETED | OUTPATIENT
Start: 2020-08-17 | End: 2020-08-17

## 2020-08-17 RX ORDER — HEPARIN SODIUM,PORCINE 10 UNIT/ML
VIAL (ML) INTRAVENOUS
Status: DISCONTINUED
Start: 2020-08-17 | End: 2020-08-17 | Stop reason: HOSPADM

## 2020-08-17 RX ORDER — ACETAMINOPHEN 325 MG/1
TABLET ORAL
Status: COMPLETED
Start: 2020-08-17 | End: 2020-08-17

## 2020-08-17 RX ORDER — HEPARIN SODIUM (PORCINE) LOCK FLUSH IV SOLN 100 UNIT/ML 100 UNIT/ML
SOLUTION INTRAVENOUS
Status: DISCONTINUED
Start: 2020-08-17 | End: 2020-08-17 | Stop reason: HOSPADM

## 2020-08-17 RX ORDER — DIPHENHYDRAMINE HYDROCHLORIDE 50 MG/ML
INJECTION INTRAMUSCULAR; INTRAVENOUS
Status: COMPLETED
Start: 2020-08-17 | End: 2020-08-17

## 2020-08-17 RX ORDER — HEPARIN SODIUM,PORCINE 10 UNIT/ML
VIAL (ML) INTRAVENOUS
Status: COMPLETED
Start: 2020-08-17 | End: 2020-08-17

## 2020-08-17 RX ADMIN — ACETAMINOPHEN 650 MG: 325 TABLET ORAL at 14:48

## 2020-08-17 RX ADMIN — HEPARIN, PORCINE (PF) 10 UNIT/ML INTRAVENOUS SYRINGE 5 ML: at 15:41

## 2020-08-17 RX ADMIN — DIPHENHYDRAMINE HYDROCHLORIDE 50 MG: 50 INJECTION, SOLUTION INTRAMUSCULAR; INTRAVENOUS at 14:48

## 2020-08-17 RX ADMIN — Medication 5 ML: at 15:41

## 2020-08-17 RX ADMIN — HEPARIN 5 ML: 100 SYRINGE at 14:57

## 2020-08-17 RX ADMIN — DIPHENHYDRAMINE HYDROCHLORIDE 50 MG: 50 INJECTION INTRAMUSCULAR; INTRAVENOUS at 14:48

## 2020-08-17 ASSESSMENT — PAIN SCALES - GENERAL: PAINLEVEL: NO PAIN (0)

## 2020-08-17 ASSESSMENT — MIFFLIN-ST. JEOR: SCORE: 1590.99

## 2020-08-17 NOTE — PROGRESS NOTES
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 Port accessed using sterile technique; + blood return, Heparin flushed and de-accessed without difficulty.     Coping:   Child Family Life declined    Infusion Note: Pt arrived to clinic with mom. Pt denies any recent fever/infections; no new issues/concerns. Labs drawn from CVC. Pt due for port flush; accessed and flushed without issues. Hgb 8.6, Plt 17; plan to transfuse plts per provider. Pt premedicated with Tylenol 650 mg PO and IV Benadryl 50 mg given over 15 minutes. Plts transfused over one hour without issues. VSS. Red lumen heparin locked at completion. Pt seen by Gabriella Paul NP while in clinic today.     Discharge Plan:   Mother verbalized understanding of discharge instructions.  RN reviewed that pt should return to clinic tomorrow, as scheduled. Pt left [facility] in stable condition.

## 2020-08-17 NOTE — PHARMACY-CONSULT NOTE
Discharge TPN Monitoring     Isael's TPN formula, labs, and nutritional status were reviewed today.        Discussed with Gabriella Paul NP and communicated to [facility].     Isael will return to clinic on Wednesday 8/19/20 for labs and reassessment.     The following reflects the current TPN formula:  Dosing Weight: 58.4 kg  Volume: 1200 ml, cycled over 12 hours  Protein: 88 g/day   Dextrose: 210 g  SMOF Lipids: 250 mL     Sodium: 45 mEq/day  Potassium:   18 mEq/day  Calcium: 10 mEq/day  Magnesium:  5 mEq/day  Phosphorus:  40 mMol/day (increasd from 35 mmol/day)   Chloride:Acetate ratio: 1:2 (best max chloride)  Trace elements with Selenium: standard  Multivitamins: standard  Vitamin K:  10 mg     Pharmacy will continue to follow.  Alexa Ramirez RPH

## 2020-08-17 NOTE — PROGRESS NOTES
This is a recent snapshot of the patient's [facility] medical record.  For current drug dose and complete information and questions, call [phone] or In Basket pool, fv home infusion ([id number])  CSN Number:  [id number]

## 2020-08-17 NOTE — NURSING NOTE
"No chief complaint on file.      /73 (BP Location: Left arm, Patient Position: Sitting, Cuff Size: Adult Regular)   Pulse 85   Temp 97.7  F (36.5  C) (Oral)   Resp 18   Ht 1.744 m (5' 8.66\")   Wt 60.6 kg (133 lb 9.6 oz)   SpO2 99%   BMI 19.92 kg/m      Byron Schwab LPN  August 17, 2020    "

## 2020-08-17 NOTE — PROGRESS NOTES
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 He was discharged on 8/14 following his admission for febrile neutropenia and Kymriah infusion. His course was complicated by grade 3 CRS with hypotension and CAR-T neurotoxicity requiring 3 doses of tociluzimab, fluid boluses, dopamine, and steroids.     Artemio has been doing well since his hospital discharge on 8/14. His oral intake is improving significantly over the past few days, and he is drinking about 24 oz of fluids daily. He denies nausea or vomiting, but endorses some mild abdominal pain about once per day after eating. It is usually 4-5/10 and lasts for a short period of time, resolving on its own. He did take 1 dose of oxycodone this weekend for the abdominal pain with good effect. He has normal bladder and bowel function with soft stools. He reports no changes in his mental status and denies headache, dizziness, confusion, or loss of balance. He has remained afebrile without rash, URI symptoms, GI symptoms, or bleeding.    Review of Systems: Pertinent positives include those mentioned in interval events. A complete review of systems was performed and is otherwise negative.      Medications:  Please see MAR    Physical Exam:  Vital Signs for Peds 8/17/2020   SYSTOLIC 105   DIASTOLIC 73   PULSE 85   TEMPERATURE 97.7   RESPIRATIONS 18   WEIGHT (kg) 60.6 kg   HEIGHT (cm) 174.4 cm   BMI 19.92   pain    O2 99     GEN: lying in infusion bed, alert, awake, interactive. Mother at bedside.  HEENT: Full head of hair, NC/AT, sclerae anicteric, nares patent without rhinorrhea, OP clear without lesions, MMM.   CARD: RRR, normal S1/S2 without murmur. Cap refill < 2 seconds.  RESP: CTAB, good air entry throughout, no w/r/r.   ABD: Soft, nontender, nondistended. No organomegaly appreciated.   EXTREM: WWP, no edema.   SKIN: pale, no bruises, rashes, or lesions present.  NEURO: DL Galvan to left chest  C/D/I, right port unaccessed    Labs:  Results for orders placed or performed in visit on 08/17/20   CBC with platelets differential     Status: Abnormal   Result Value Ref Range    WBC 0.6 (LL) 4.0 - 11.0 10e9/L    RBC Count 3.00 (L) 4.4 - 5.9 10e12/L    Hemoglobin 8.6 (L) 13.3 - 17.7 g/dL    Hematocrit 25.7 (L) 40.0 - 53.0 %    MCV 86 78 - 100 fl    MCH 28.7 26.5 - 33.0 pg    MCHC 33.5 31.5 - 36.5 g/dL    RDW 14.3 10.0 - 15.0 %    Platelet Count 17 (LL) 150 - 450 10e9/L    Diff Method Manual Differential     % Neutrophils 48.2 %    % Lymphocytes 31.6 %    % Monocytes 19.3 %    % Eosinophils 0.0 %    % Basophils 0.0 %    % Metamyelocytes 0.9 %    Nucleated RBCs 10 (H) 0 /100    Absolute Neutrophil 0.3 (LL) 1.6 - 8.3 10e9/L    Absolute Lymphocytes 0.2 (L) 0.8 - 5.3 10e9/L    Absolute Monocytes 0.1 0.0 - 1.3 10e9/L    Absolute Eosinophils 0.0 0.0 - 0.7 10e9/L    Absolute Basophils 0.0 0.0 - 0.2 10e9/L    Absolute Metamyelocytes 0.0 0 10e9/L    Absolute Nucleated RBC 0.1     RBC Morphology Normal     Platelet Estimate Decreased    Comprehensive metabolic panel     Status: Abnormal   Result Value Ref Range    Sodium 139 133 - 144 mmol/L    Potassium 3.8 3.4 - 5.3 mmol/L    Chloride 106 94 - 109 mmol/L    Carbon Dioxide 27 20 - 32 mmol/L    Anion Gap 6 3 - 14 mmol/L    Glucose 148 (H) 70 - 99 mg/dL    Urea Nitrogen 23 7 - 30 mg/dL    Creatinine 0.52 (L) 0.66 - 1.25 mg/dL    GFR Estimate >90 >60 mL/min/[1.73_m2]    GFR Estimate If Black >90 >60 mL/min/[1.73_m2]    Calcium 8.6 8.5 - 10.1 mg/dL    Bilirubin Total 1.4 (H) 0.2 - 1.3 mg/dL    Albumin 3.4 3.4 - 5.0 g/dL    Protein Total 6.7 (L) 6.8 - 8.8 g/dL    Alkaline Phosphatase 105 40 - 150 U/L     (H) 0 - 70 U/L    AST 46 (H) 0 - 45 U/L   Magnesium     Status: None   Result Value Ref Range    Magnesium 1.8 1.6 - 2.3 mg/dL   Phosphorus     Status: None   Result Value Ref Range    Phosphorus 3.1 2.5 - 4.5 mg/dL   Lactate Dehydrogenase     Status: Abnormal   Result  Value Ref Range    Lactate Dehydrogenase 262 (H) 85 - 227 U/L   Uric acid     Status: Abnormal   Result Value Ref Range    Uric Acid 1.9 (L) 3.5 - 7.2 mg/dL   Ferritin     Status: Abnormal   Result Value Ref Range    Ferritin 4,311 (H) 26 - 388 ng/mL   CRP inflammation     Status: None   Result Value Ref Range    CRP Inflammation <2.9 0.0 - 8.0 mg/L   INR     Status: Abnormal   Result Value Ref Range    INR 1.23 (H) 0.86 - 1.14   Partial thromboplastin time     Status: Abnormal   Result Value Ref Range    PTT >240 (HH) 22 - 37 sec   D dimer quantitative     Status: Abnormal   Result Value Ref Range    D Dimer 1.5 (H) 0.0 - 0.50 ug/ml FEU   Fibrinogen activity     Status: Abnormal   Result Value Ref Range    Fibrinogen 140 (L) 200 - 420 mg/dL   Hepzymed PTT     Status: None   Result Value Ref Range    Hepzymed PTT 26 22 - 37 sec   Platelets prepare order unit     Status: None   Result Value Ref Range    Blood Component Type PLT Pheresis     Units Ordered 1    Blood component     Status: None   Result Value Ref Range    Unit Number [id number]     Blood Component Type PlateletPheresis,LeukoRed Irrad (Part 2)     Division Number 00     Status of Unit Released to care unit 08/17/2020 1435     Blood Product Code [id number]     Unit Status ISS      Assessment/Plan:  Artemio Nino is a  20 year old male with 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 [facility].  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 He ws recently discharged on 8/14 following admission for fever and Kymriah infusion (7/24-8/14) following outpatient lymphodepleting chemotherapy per [id number] with cytoxan and fludarabine 7/21-7/24 completed as an outpatient. His course was complicated by grade 3 CRS with hypotension which developed on 8/6 requiring an NS bolus and initiation of dopamine, and administration of tocilizumab x3 (8/6, 8/7, 8/8). Isael also experienced CAR-T  neurotoxicity which began 8/6 with headache and vomiting which progressed.     Artemio is doing well and clinically well appearing today. He will get platelets today for platelets of 17K.      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 Relapsed 6/5/2020, 19 months status post matched related BMT per [id number] (cytoxan/TBI arm) with CD19 (dim) relapsed b-cell lymphoblastic leukemia. Completed apheresis collection in the [facility] (6/2020). Bridging chemotherapy with oral 6MP, methotrexate and ruxolitinib. All oral chemotherapy stopped on 7/12. BM revealed 75% involvement with leukemia. CSF was negative.   - Outpatient lymphodepleting chemotherapy per [id number] with cytoxan and fludarabine 7/21-7/24 completed as an outpatient. [facility] infusion inpatient 7/27     #CRS: Now resolved. date of onset: 7/28/20, initial grade 2 with max grade 3 (on 8/6).   - 7/28: IV Antibiotics  - 8/6: One 500 mL fluid bolus, dopamine 2 mcg/kg/min, increased dopamine to 7 mcg/kg/min. Gave 1 dose of Tociluzimab. IV fluids 100 mL/hr.  - 8/7: 2nd dose of Tociluzimab  - 8/8: 3rd dose of Tociluzimab and initiated Methylprednisolone  - 8/9-10 Continue methylprednisolone Q12H  - 8/11: wean methylpred to 0.5 mg/kg Q12h   - 8/12: vasopressors off, afebrile, intermittent bradycardia     #CAR-T Neurotoxicity: Now Resolved. 8/6 developed neck pain, headache and vomiting which worsened over 48 hours requiring narcotics. Initiated Methylprednisolone Q12H on 8/8. Methylpred weaned 8/11 (see above). Symptoms resolved and transitioned to prednisone 8/12. Morphine stopped 8/13.  - Continue prednisone 20 mg daily. Currently weaning.   - Current Neurotoxicity score: 10     FEN/Renal:   # Risk for malnutrition: Intake improving significantly since discharge  - Continue nutritional support with TPN/SMOF lipids  - Consult with Dietician at next appointment to evaluate appropriateness of  stopping lipids    - Monitor nutritional intake     # Risk for electrolyte abnormalities:  - Check electrolytes in clinic and adjust in TPN as needed     # Risk for renal dysfunction/risk for ELIZ secondary to potential tumor lysis with preparatory chemotherapy: GFR 133ml/min  - Monitor weights and labs      # Risk for TLS: Normal uric acid with low phosphorous throughout hospitalization.  Restart allopurinol if uric acid > 2.5.  At time of discharge, uric acid 2.2 on 8/13.  - Continue to monitor      Pulmonary:  # Risk for pulmonary insufficiency: No current concerns. Normal chest and sinus CTs during work-up.      Cardiovascular:  # Hypotension, resolved  #Asymptomatic bradycardia: Continued on Dopamine. Titrated off dopamine on 8/11.   Subsequent day experienced asymptomatic bradycardia; confirmed sinus bradycardia on EKG.  Now resolved.     # Aaron-Parkinson-White Syndrome: diagnosed upon syncope in 02/2018. Asymptomatic, no interventions to date. No current concerns.  - EKG 7/15 revealed WPW with normal sinus rhythm.   - Echocardiogram 7/15 showed LVEF of 62%  - EKG 8/12 showed resolution of WPW     Heme:   # Pancytopenia secondary to chemotherapy:   - Transfuse for hemoglobin < 7, platelets < 20,000, platelet parameter increased 8/6 due to mild epistaxis & hematemesis with streaks of blood. Platelets given 8/17 for plt count of 17K   -  Mild hives noted at end of pRBC transfusion on 7/25. Premedicate with Tylenol and Benadryl for pRBC transfusions.   - Recent history of significant hives with platelet transfusions. Was being premedicated with hydrocortisone, tylenol, and benadryl for platelets. However, no hydrocortisone premedication ordered now given CAR-T.   - No GCSF per protocol     #Coagulopathy related to CRS:  No active signs of bleeding.   He received numerous doses of cyro (8/9-11). Coagulopathy labs improving, 8/17- INR 1.23, hepzymed PTT 26, fibrinogen 140, D-Dimer 1.5  - Continue to monitor per  protocol     Infectious Disease:  # Recent Febrile Neutropenia: Admitted for fevers 7/24, continued and thought to be related to CRS. Blood Cx NGTD and COVID neg 7/24. Now s/p Vanco and Cefepime. Continues on PO Levofloxacin (see below).    # Risk for infection given immunocompromised status:    Active: See above  Prophylaxis:           - Viral prophylaxis: HD Valtrex continue through Day +30  - Fungal prophylaxis: Micafungin was discontinued at discharge.  Plan to start fluconazole with continued improvement of liver function. History of photoxic drug eruption with voriconazole, avoid use.   - Bacterial prophylaxis: Levofloxacin 500 mg daily until ANC > 1000.  - PCP prophylaxis: Bactrim to begin day +28      GI:   # Nausea, stable after starting scheduled zofran.  Intermittent nausea throughout the day ongoing.    - Zofran q6H     # Acid reflux:    -Protonix BID while on steroids with plan to wean to daily at discontinuation.    -Continue TUMS prn.     # Recent Transaminitis:  Possibly related to recent chemotherapy.  8/17- , AST 46  -Continue to monitor.       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

## 2020-08-18 ENCOUNTER — HOME INFUSION (PRE-WILLOW HOME INFUSION) (OUTPATIENT)
Dept: PHARMACY | Facility: CLINIC | Age: 20
End: 2020-08-18

## 2020-08-18 ENCOUNTER — ONCOLOGY VISIT (OUTPATIENT)
Dept: TRANSPLANT | Facility: CLINIC | Age: 20
End: 2020-08-18
Attending: PHYSICIAN ASSISTANT
Payer: COMMERCIAL

## 2020-08-18 VITALS
HEART RATE: 99 BPM | HEIGHT: 69 IN | DIASTOLIC BLOOD PRESSURE: 73 MMHG | WEIGHT: 135.14 LBS | BODY MASS INDEX: 20.02 KG/M2 | SYSTOLIC BLOOD PRESSURE: 110 MMHG | RESPIRATION RATE: 16 BRPM | OXYGEN SATURATION: 99 % | TEMPERATURE: 98 F

## 2020-08-18 DIAGNOSIS — C91.02 ACUTE LYMPHOBLASTIC LEUKEMIA (ALL) IN RELAPSE (H): ICD-10-CM

## 2020-08-18 LAB
ALBUMIN SERPL-MCNC: 3.6 G/DL (ref 3.4–5)
ALP SERPL-CCNC: 101 U/L (ref 40–150)
ALT SERPL W P-5'-P-CCNC: 215 U/L (ref 0–70)
ANION GAP SERPL CALCULATED.3IONS-SCNC: 8 MMOL/L (ref 3–14)
AST SERPL W P-5'-P-CCNC: 50 U/L (ref 0–45)
BASOPHILS # BLD AUTO: 0 10E9/L (ref 0–0.2)
BASOPHILS NFR BLD AUTO: 0 %
BILIRUB SERPL-MCNC: 1.7 MG/DL (ref 0.2–1.3)
BUN SERPL-MCNC: 23 MG/DL (ref 7–30)
CALCIUM SERPL-MCNC: 8.6 MG/DL (ref 8.5–10.1)
CHLORIDE SERPL-SCNC: 106 MMOL/L (ref 94–109)
CO2 SERPL-SCNC: 27 MMOL/L (ref 20–32)
CREAT SERPL-MCNC: 0.54 MG/DL (ref 0.66–1.25)
DIFFERENTIAL METHOD BLD: ABNORMAL
EOSINOPHIL # BLD AUTO: 0 10E9/L (ref 0–0.7)
EOSINOPHIL NFR BLD AUTO: 0 %
ERYTHROCYTE [DISTWIDTH] IN BLOOD BY AUTOMATED COUNT: 14.3 % (ref 10–15)
GFR SERPL CREATININE-BSD FRML MDRD: >90 ML/MIN/{1.73_M2}
GLUCOSE SERPL-MCNC: 126 MG/DL (ref 70–99)
HCT VFR BLD AUTO: 26.7 % (ref 40–53)
HGB BLD-MCNC: 8.7 G/DL (ref 13.3–17.7)
IMM GRANULOCYTES # BLD: 0 10E9/L (ref 0–0.4)
IMM GRANULOCYTES NFR BLD: 1.7 %
LYMPHOCYTES # BLD AUTO: 0.2 10E9/L (ref 0.8–5.3)
LYMPHOCYTES NFR BLD AUTO: 37.3 %
MAGNESIUM SERPL-MCNC: 2 MG/DL (ref 1.6–2.3)
MCH RBC QN AUTO: 28.4 PG (ref 26.5–33)
MCHC RBC AUTO-ENTMCNC: 32.6 G/DL (ref 31.5–36.5)
MCV RBC AUTO: 87 FL (ref 78–100)
MONOCYTES # BLD AUTO: 0.1 10E9/L (ref 0–1.3)
MONOCYTES NFR BLD AUTO: 18.6 %
NEUTROPHILS # BLD AUTO: 0.3 10E9/L (ref 1.6–8.3)
NEUTROPHILS NFR BLD AUTO: 42.4 %
NRBC # BLD AUTO: 0 10*3/UL
NRBC BLD AUTO-RTO: 0 /100
PHOSPHATE SERPL-MCNC: 4 MG/DL (ref 2.5–4.5)
PLATELET # BLD AUTO: 40 10E9/L (ref 150–450)
PLATELET # BLD EST: ABNORMAL 10*3/UL
POTASSIUM SERPL-SCNC: 3.7 MMOL/L (ref 3.4–5.3)
PROT SERPL-MCNC: 7 G/DL (ref 6.8–8.8)
RBC # BLD AUTO: 3.06 10E12/L (ref 4.4–5.9)
RBC MORPH BLD: NORMAL
SODIUM SERPL-SCNC: 141 MMOL/L (ref 133–144)
URATE SERPL-MCNC: 1.8 MG/DL (ref 3.5–7.2)
WBC # BLD AUTO: 0.6 10E9/L (ref 4–11)

## 2020-08-18 PROCEDURE — 36592 COLLECT BLOOD FROM PICC: CPT | Performed by: NURSE PRACTITIONER

## 2020-08-18 PROCEDURE — 85025 COMPLETE CBC W/AUTO DIFF WBC: CPT | Performed by: NURSE PRACTITIONER

## 2020-08-18 PROCEDURE — 83735 ASSAY OF MAGNESIUM: CPT | Performed by: NURSE PRACTITIONER

## 2020-08-18 PROCEDURE — 80053 COMPREHEN METABOLIC PANEL: CPT | Performed by: NURSE PRACTITIONER

## 2020-08-18 PROCEDURE — 84550 ASSAY OF BLOOD/URIC ACID: CPT | Performed by: NURSE PRACTITIONER

## 2020-08-18 PROCEDURE — G0463 HOSPITAL OUTPT CLINIC VISIT: HCPCS | Mod: ZF

## 2020-08-18 PROCEDURE — 84100 ASSAY OF PHOSPHORUS: CPT | Performed by: NURSE PRACTITIONER

## 2020-08-18 RX ORDER — ACETAMINOPHEN 325 MG/1
650 TABLET ORAL ONCE
Status: CANCELLED
Start: 2020-08-18

## 2020-08-18 RX ORDER — DIPHENHYDRAMINE HYDROCHLORIDE 50 MG/ML
50 INJECTION INTRAMUSCULAR; INTRAVENOUS EVERY 6 HOURS PRN
Status: CANCELLED
Start: 2020-08-18

## 2020-08-18 ASSESSMENT — PAIN SCALES - GENERAL: PAINLEVEL: NO PAIN (0)

## 2020-08-18 ASSESSMENT — MIFFLIN-ST. JEOR: SCORE: 1597.37

## 2020-08-18 NOTE — PROGRESS NOTES
This is a recent snapshot of the patient's [facility] medical record.  For current drug dose and complete information and questions, call [phone] or In Basket pool, fv home infusion ([id number])  CSN Number:  [id number]

## 2020-08-18 NOTE — PROGRESS NOTES
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 He was discharged on 8/14 following his admission for febrile neutropenia and Kymriah infusion. His course was complicated by grade 3 CRS with hypotension and CAR-T neurotoxicity requiring 3 doses of tociluzimab, fluid boluses, dopamine, and steroids.     Artemio is here today with his mother for routine follow up care. No fevers or URI concerns. Weaning from steroids from previous CRS. No current CAR-T neurotoxicity symptoms. He remains on  Remains on nutritional support with TPN/lipids with improving appetite. Oral fluids improving. No nausea, emesis or diarrhea. No pain concerns. No rash. He received a platelet transfusion on 8/17 that resulted in mild small single hive on his chin that resolved.     Review of Systems: Pertinent positives include those mentioned in interval events. A complete review of systems was performed and is otherwise negative.      Medications:  Please see MAR    Physical Exam:  /73 (BP Location: Left arm, Patient Position: Sitting, Cuff Size: Adult Regular)   Pulse 99   Temp 98  F (36.7  C) (Oral)   Resp 16   Ht 1.743 m (5' 8.62\")   Wt 61.3 kg (135 lb 2.3 oz)   SpO2 99%   BMI 20.18 kg/m    GEN: Sitting on exam table in NAD. Pleasant and cooperative. Mother present.   HEENT: Full head of hair, NC/AT, sclerae anicteric, nares patent without rhinorrhea, OP clear without lesions, MMM.   CARD: RRR, normal S1/S2 without murmur. Cap refill < 2 seconds.  RESP: CTAB, good air entry throughout, no adventitious lung sounds.   ABD: Soft, nontender, nondistended. No organomegaly appreciated.   EXTREM: WWP, no edema.   SKIN: Pale, no bruises, rashes, or lesions present.  NEURO: DL Galvan to left chest C/D/I, right port unaccessed    Labs:  Results for orders placed or performed in visit on 08/18/20   Comprehensive metabolic panel     Status: None (In " process)   Result Value Ref Range    Sodium 141 133 - 144 mmol/L    Potassium 3.7 3.4 - 5.3 mmol/L    Chloride 106 94 - 109 mmol/L    Carbon Dioxide PENDING 20 - 32 mmol/L    Anion Gap PENDING 3 - 14 mmol/L    Glucose PENDING 70 - 99 mg/dL    Urea Nitrogen PENDING 7 - 30 mg/dL    Creatinine PENDING 0.66 - 1.25 mg/dL    GFR Estimate PENDING >60 mL/min/[1.73_m2]    GFR Estimate If Black PENDING >60 mL/min/[1.73_m2]    Calcium PENDING 8.5 - 10.1 mg/dL    Bilirubin Total PENDING 0.2 - 1.3 mg/dL    Albumin PENDING 3.4 - 5.0 g/dL    Protein Total PENDING 6.8 - 8.8 g/dL    Alkaline Phosphatase PENDING 40 - 150 U/L    ALT PENDING 0 - 70 U/L    AST PENDING 0 - 45 U/L   CBC with platelets differential     Status: Abnormal (In process)   Result Value Ref Range    WBC 0.6 (LL) 4.0 - 11.0 10e9/L    RBC Count 3.06 (L) 4.4 - 5.9 10e12/L    Hemoglobin 8.7 (L) 13.3 - 17.7 g/dL    Hematocrit 26.7 (L) 40.0 - 53.0 %    MCV 87 78 - 100 fl    MCH 28.4 26.5 - 33.0 pg    MCHC 32.6 31.5 - 36.5 g/dL    RDW 14.3 10.0 - 15.0 %    Platelet Count 40 (LL) 150 - 450 10e9/L    Diff Method PENDING      Assessment/Plan:  Artemio Nino is a  20 year old male with 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 He is now Day +22 from [facility] therapy.  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 He ws recently discharged on 8/14 following admission for fever and [facility] infusion (7/24-8/14) following outpatient lymphodepleting chemotherapy per [id number] with cytoxan and fludarabine 7/21-7/24 completed as an outpatient. His course was complicated by grade 3 CRS with hypotension which developed on 8/6 requiring an NS bolus and initiation of dopamine, and administration of tocilizumab x3 (8/6, 8/7, 8/8). Isael also experienced CAR-T neurotoxicity which began 8/6 with headache and vomiting which progressed.     Artemio has been clinically well since hospital discharge. Appetite improving but requires  nutritional support with TPN, stop lipids today. No CRS. Stop prednisone today. Last platelet transfusion on 8/17 with mild hives on chin.     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 Relapsed 6/5/2020, 19 months status post matched related BMT per [id number] (cytoxan/TBI arm) with CD19 (dim) relapsed b-cell lymphoblastic leukemia. Completed apheresis collection in the [facility] (6/2020). Bridging chemotherapy with oral 6MP, methotrexate and ruxolitinib. All oral chemotherapy stopped on 7/12. BM revealed 75% involvement with leukemia. CSF was negative.   - Outpatient lymphodepleting chemotherapy per [id number] with cytoxan and fludarabine 7/21-7/24 completed as an outpatient. [facility] infusion inpatient 7/27     #CRS: Now resolved. date of onset: 7/28/20, initial grade 2 with max grade 3 (on 8/6).   - 7/28: IV Antibiotics  - 8/6: One 500 mL fluid bolus, dopamine 2 mcg/kg/min, increased dopamine to 7 mcg/kg/min. Gave 1 dose of Tociluzimab. IV fluids 100 mL/hr.  - 8/7: 2nd dose of Tociluzimab  - 8/8: 3rd dose of Tociluzimab and initiated Methylprednisolone  - 8/9-10 Continue methylprednisolone Q12H  - 8/11: wean methylpred to 0.5 mg/kg Q12h   - 8/12: vasopressors off, afebrile, intermittent bradycardia    #CAR-T Neurotoxicity: Now Resolved. 8/6 developed neck pain, headache and vomiting which worsened over 48 hours requiring narcotics. Initiated Methylprednisolone Q12H on 8/8. Methylpred weaned 8/11 (see above). Symptoms resolved and transitioned to prednisone 8/12. Morphine stopped 8/13.  - Wean completed. Ok to stop steroids today per Dr. Zavala.   - Current Neurotoxicity score: 10     FEN/Renal:   # Risk for malnutrition: Intake improving significantly since discharge.   - Continue nutritional support with TPN. Ok to stop SMOF lipids today. Dietician assessment on Thursday 8/20.  - Consult with Dietician at next appointment to evaluate  appropriateness of stopping lipids    - Monitor nutritional intake     # Risk for electrolyte abnormalities:  - Check electrolytes in clinic and adjust in TPN as needed     # Risk for renal dysfunction/risk for ELIZ secondary to potential tumor lysis with preparatory chemotherapy: GFR 133ml/min  - Monitor weights and labs      # Risk for TLS: Normal uric acid with low phosphorous throughout hospitalization.  Restart allopurinol if uric acid > 2.5.  At time of discharge, uric acid 2.2 on 8/13.  - Continue to monitor      Pulmonary:  # Risk for pulmonary insufficiency: No current concerns. Normal chest and sinus CTs during work-up.      Cardiovascular:  # Hypotension, resolved  # Asymptomatic bradycardia: Continued on Dopamine. Titrated off dopamine on 8/11.   Subsequent day experienced asymptomatic bradycardia; confirmed sinus bradycardia on EKG.  Now resolved.     # Aaron-Parkinson-White Syndrome: diagnosed upon syncope in 02/2018. Asymptomatic, no interventions to date. No current concerns.  - EKG 7/15 revealed WPW with normal sinus rhythm.   - Echocardiogram 7/15 showed LVEF of 62%  - EKG 8/12 showed resolution of WPW     Heme:   # Pancytopenia secondary to chemotherapy:   - Transfuse for hemoglobin < 7, platelets < 20,000, platelet parameter increased 8/6 due to mild epistaxis & hematemesis with streaks of blood. Platelets given 8/17 for plt count of 17K. Mild hives on chin reported .   -  Mild hives noted at end of pRBC transfusion on 7/25. Premedicate with Tylenol and Benadryl for pRBC transfusions.   - Recent history of significant hives with platelet transfusions. Was being premedicated with hydrocortisone, tylenol, and benadryl for platelets. However, no hydrocortisone premedication ordered now given CAR-T.   - No GCSF per protocol     #Coagulopathy related to CRS:  No active signs of bleeding.   He received numerous doses of cyro (8/9-11). Coagulopathy labs improving, 8/17- INR 1.23, hepzymed PTT 26,  fibrinogen 140, D-Dimer 1.5  - Continue to monitor per protocol     Infectious Disease:  # Recent Febrile Neutropenia: Admitted for fevers 7/24, continued and thought to be related to CRS. Blood Cx NGTD and COVID neg 7/24. Now s/p Vanco and Cefepime. Continues on PO Levofloxacin (see below).    # Risk for infection given immunocompromised status:    Active: See above  Prophylaxis:           - Viral prophylaxis: HD Valtrex continue through Day +30  - Fungal prophylaxis: Micafungin was discontinued at discharge.  Will begin fluconazole once transaminitis resolves, improving.   - History of photoxic drug eruption with voriconazole, avoid use.   - Bacterial prophylaxis: Levofloxacin 500 mg daily until ANC > 1000.  - PCP prophylaxis: Bactrim to begin day +28      GI:   # Nausea, stable after starting scheduled zofran.  Intermittent nausea throughout the day ongoing.    - Zofran q6H     # Acid reflux:    - Protonix BID while on steroids with plan to wean to daily at discontinuation.    - Continue TUMS prn.     # Recent Transaminitis:  Possibly related to recent chemotherapy.  ALT slowly improving.   -Continue to monitor.       Oral Hygiene:   # Receding gums, risk for infection  - Continue chlorhexidine mouth wash for symptomatic control, pain now improved     Fertility:  Sperm collected pre chemotherapy for fertility preservation    Candido Han PA-C  Pediatric Blood and Marrow Transplant Program  [facility]      Patient Active Problem List   Diagnosis     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)     Aaron-Parkinson-White (WPW) syndrome     Bone marrow transplant candidate     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)     At risk for infection     S/P allogeneic bone marrow transplant (H)     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

## 2020-08-18 NOTE — NURSING NOTE
"Chief Complaint   Patient presents with     RECHECK     Patient being seen for ALL follow-up       /73 (BP Location: Left arm, Patient Position: Sitting, Cuff Size: Adult Regular)   Pulse 99   Temp 98  F (36.7  C) (Oral)   Resp 16   Ht 1.743 m (5' 8.62\")   Wt 61.3 kg (135 lb 2.3 oz)   SpO2 99%   BMI 20.18 kg/m      Byron Schwab LPN  August 18, 2020  "

## 2020-08-18 NOTE — PHARMACY-CONSULT NOTE
Discharge TPN Monitoring     Isael's TPN formula, labs, and nutritional status were reviewed today.        Discussed with Gabriella Paul NP and communicated to [facility].     Isael will return to clinic on Wednesday 8/19/20 for labs and reassessment.     The following reflects the current TPN formula:  Dosing Weight: 58.4 kg  Volume: 1200 ml, cycled over 12 hours  Protein: 88 g/day   Dextrose: 210 g  SMOF Lipids: none (decreased from 250 mL)      Sodium: 45 mEq/day  Potassium:   18 mEq/day  Calcium: 10 mEq/day  Magnesium:  5 mEq/day  Phosphorus:  40 mMol/day   Chloride:Acetate ratio: 1:2 (best max chloride)  Trace elements with Selenium: standard  Multivitamins: standard  Vitamin K:  10 mg     Pharmacy will continue to follow.  Aleax Ramirez RPH

## 2020-08-19 ENCOUNTER — INFUSION THERAPY VISIT (OUTPATIENT)
Dept: INFUSION THERAPY | Facility: CLINIC | Age: 20
End: 2020-08-19
Attending: PEDIATRICS
Payer: COMMERCIAL

## 2020-08-19 ENCOUNTER — ONCOLOGY VISIT (OUTPATIENT)
Dept: TRANSPLANT | Facility: CLINIC | Age: 20
End: 2020-08-19
Attending: NURSE PRACTITIONER
Payer: COMMERCIAL

## 2020-08-19 ENCOUNTER — HOME INFUSION (PRE-WILLOW HOME INFUSION) (OUTPATIENT)
Dept: PHARMACY | Facility: CLINIC | Age: 20
End: 2020-08-19

## 2020-08-19 VITALS
RESPIRATION RATE: 16 BRPM | TEMPERATURE: 98.2 F | SYSTOLIC BLOOD PRESSURE: 100 MMHG | HEART RATE: 92 BPM | OXYGEN SATURATION: 99 % | WEIGHT: 134.7 LBS | BODY MASS INDEX: 20.11 KG/M2 | DIASTOLIC BLOOD PRESSURE: 66 MMHG

## 2020-08-19 DIAGNOSIS — C91.02 ACUTE LYMPHOBLASTIC LEUKEMIA (ALL) IN RELAPSE (H): Primary | ICD-10-CM

## 2020-08-19 LAB
ALBUMIN SERPL-MCNC: 3.5 G/DL (ref 3.4–5)
ALP SERPL-CCNC: 96 U/L (ref 40–150)
ALT SERPL W P-5'-P-CCNC: 221 U/L (ref 0–70)
ANION GAP SERPL CALCULATED.3IONS-SCNC: 6 MMOL/L (ref 3–14)
ANISOCYTOSIS BLD QL SMEAR: SLIGHT
AST SERPL W P-5'-P-CCNC: 50 U/L (ref 0–45)
BASOPHILS # BLD AUTO: 0 10E9/L (ref 0–0.2)
BASOPHILS NFR BLD AUTO: 0 %
BILIRUB SERPL-MCNC: 1.8 MG/DL (ref 0.2–1.3)
BUN SERPL-MCNC: 21 MG/DL (ref 7–30)
CALCIUM SERPL-MCNC: 8.6 MG/DL (ref 8.5–10.1)
CHLORIDE SERPL-SCNC: 106 MMOL/L (ref 94–109)
CO2 SERPL-SCNC: 28 MMOL/L (ref 20–32)
CREAT SERPL-MCNC: 0.51 MG/DL (ref 0.66–1.25)
DIFFERENTIAL METHOD BLD: ABNORMAL
EOSINOPHIL # BLD AUTO: 0 10E9/L (ref 0–0.7)
EOSINOPHIL NFR BLD AUTO: 0 %
ERYTHROCYTE [DISTWIDTH] IN BLOOD BY AUTOMATED COUNT: 14.6 % (ref 10–15)
GFR SERPL CREATININE-BSD FRML MDRD: >90 ML/MIN/{1.73_M2}
GLUCOSE SERPL-MCNC: 112 MG/DL (ref 70–99)
HCT VFR BLD AUTO: 26.5 % (ref 40–53)
HGB BLD-MCNC: 8.8 G/DL (ref 13.3–17.7)
LYMPHOCYTES # BLD AUTO: 0.3 10E9/L (ref 0.8–5.3)
LYMPHOCYTES NFR BLD AUTO: 41.2 %
Lab: NORMAL
Lab: NORMAL
MCH RBC QN AUTO: 28.9 PG (ref 26.5–33)
MCHC RBC AUTO-ENTMCNC: 33.2 G/DL (ref 31.5–36.5)
MCV RBC AUTO: 87 FL (ref 78–100)
MONOCYTES # BLD AUTO: 0.1 10E9/L (ref 0–1.3)
MONOCYTES NFR BLD AUTO: 10.5 %
NEUTROPHILS # BLD AUTO: 0.3 10E9/L (ref 1.6–8.3)
NEUTROPHILS NFR BLD AUTO: 48.3 %
NRBC # BLD AUTO: 0 10*3/UL
NRBC BLD AUTO-RTO: 4 /100
PLATELET # BLD AUTO: 28 10E9/L (ref 150–450)
PLATELET # BLD EST: ABNORMAL 10*3/UL
POTASSIUM SERPL-SCNC: 3.5 MMOL/L (ref 3.4–5.3)
PROT SERPL-MCNC: 6.9 G/DL (ref 6.8–8.8)
RBC # BLD AUTO: 3.04 10E12/L (ref 4.4–5.9)
SODIUM SERPL-SCNC: 140 MMOL/L (ref 133–144)
SPECIMEN SOURCE: NORMAL
SPECIMEN SOURCE: NORMAL
URATE SERPL-MCNC: 1.4 MG/DL (ref 3.5–7.2)
WBC # BLD AUTO: 0.7 10E9/L (ref 4–11)
YEAST SPEC QL CULT: NO GROWTH
YEAST SPEC QL CULT: NO GROWTH

## 2020-08-19 PROCEDURE — 80053 COMPREHEN METABOLIC PANEL: CPT | Performed by: PHYSICIAN ASSISTANT

## 2020-08-19 PROCEDURE — G0463 HOSPITAL OUTPT CLINIC VISIT: HCPCS

## 2020-08-19 PROCEDURE — 36592 COLLECT BLOOD FROM PICC: CPT

## 2020-08-19 PROCEDURE — 85025 COMPLETE CBC W/AUTO DIFF WBC: CPT | Performed by: PHYSICIAN ASSISTANT

## 2020-08-19 PROCEDURE — 84550 ASSAY OF BLOOD/URIC ACID: CPT | Performed by: PHYSICIAN ASSISTANT

## 2020-08-19 PROCEDURE — 25000128 H RX IP 250 OP 636: Mod: ZF

## 2020-08-19 RX ORDER — HEPARIN SODIUM,PORCINE 10 UNIT/ML
2-4 VIAL (ML) INTRAVENOUS EVERY 24 HOURS
Status: DISCONTINUED | OUTPATIENT
Start: 2020-08-19 | End: 2020-08-19 | Stop reason: HOSPADM

## 2020-08-19 RX ORDER — ACETAMINOPHEN 325 MG/1
650 TABLET ORAL ONCE
Status: CANCELLED
Start: 2020-08-19

## 2020-08-19 RX ORDER — DIPHENHYDRAMINE HCL 50 MG
50 CAPSULE ORAL EVERY 6 HOURS PRN
Qty: 30 CAPSULE | Refills: 3
Start: 2020-08-19 | End: 2020-08-28

## 2020-08-19 RX ORDER — DIPHENHYDRAMINE HYDROCHLORIDE 50 MG/ML
50 INJECTION INTRAMUSCULAR; INTRAVENOUS EVERY 6 HOURS PRN
Status: CANCELLED
Start: 2020-08-19

## 2020-08-19 RX ORDER — HEPARIN SODIUM,PORCINE 10 UNIT/ML
VIAL (ML) INTRAVENOUS
Status: COMPLETED
Start: 2020-08-19 | End: 2020-08-19

## 2020-08-19 RX ADMIN — HEPARIN, PORCINE (PF) 10 UNIT/ML INTRAVENOUS SYRINGE 50 UNITS: at 14:05

## 2020-08-19 RX ADMIN — Medication 50 UNITS: at 14:05

## 2020-08-19 NOTE — PROGRESS NOTES
Infusion Nursing Note    Artemio Nino Presents to [facility] today for: Possible Plts     Due to : 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)    Intravenous Access/Labs: Blood drawn from red lumen and sent to lab.    Coping:   [facility] Family Life declined    Infusion Note: Labs indicated that patient did not need a transfusion. Red lumen heparin locked. Patient seen and assessed by Jacqueline Shin.     Discharge Plan:   mother verbalized understanding of discharge instructions.  RN reviewed that pt should return to clinic tomorrow at 1300.  Pt left [facility] in stable condition.

## 2020-08-19 NOTE — PHARMACY-CONSULT NOTE
Discharge TPN Monitoring     Isael's TPN formula, labs, and nutritional status were reviewed today.        Discussed with Mitra Shin NP and communicated to [facility].     Isael will return to clinic on Friday 8/21/20 for labs and reassessment.     The following reflects the current TPN formula:  Dosing Weight: 58.4 kg  Volume: 1200 ml, cycled over 12 hours  Protein: 88 g/day   Dextrose: 210 g  SMOF Lipids: none     Sodium: 45 mEq/day  Potassium:   18 mEq/day  Calcium: 10 mEq/day  Magnesium:  5 mEq/day  Phosphorus:  40 mMol/day   Chloride:Acetate ratio: 1:2 (best max chloride)  Trace elements with Selenium: standard  Multivitamins: standard  Vitamin K:  10 mg     Pharmacy will continue to follow.  Francisca Culp, PharmD

## 2020-08-19 NOTE — PROGRESS NOTES
Pediatric BMT Daily Progress Note  Date of Service: 8/19/20    Glen is a 20 year old male with VHR ALL now Day +23 s/p Kymriah. He was discharged on 8/14 following his admission for febrile neutropenia and Kymriah infusion. His course was complicated by grade 3 CRS with hypotension and CAR-T neurotoxicity requiring 3 doses of tociluzimab, fluid boluses, dopamine, and steroids.     Glen is feeling well today- denies fevers or need to check temperature, headaches, confusion, active bleeding, or URI symptoms. He is eating and drinking well, now off lipids. Denies nausea or bowel dysregulation. He is taking benadryl at bed time to help with sleep. Has decent energy and continues with positive demeanor.    Review of Systems: Pertinent positives include those mentioned in interval events. A complete review of systems was performed and is otherwise negative.      Medications:  Please see MAR    Physical Exam:  Vital Signs for Peds 8/19/2020   SYSTOLIC 100   DIASTOLIC 66   PULSE 92   TEMPERATURE 98.2   RESPIRATIONS 16   WEIGHT (kg) 61.1 kg   HEIGHT (cm)    BMI    pain    O2 99     GEN: Sitting on infusion bed, pleasant and cooperative. NAD. Mother present.   HEENT: Full head of hair, NC/AT, sclerae anicteric, nares patent without rhinorrhea, OP clear without lesions, MMM.   CARD: RRR, normal S1/S2 without murmur. Cap refill < 2 seconds.  RESP: CTAB, good air entry throughout, no adventitious lung sounds.   ABD: Soft, nontender, nondistended. No organomegaly appreciated.   EXTREM: WWP, no edema.   SKIN: Pale, no bruises, rashes, or lesions present.  NEURO: DL Galvan to left chest C/D/I, right port unaccessed    Labs:  Results for GLEN HAMMER (MRN [id number]) as of 8/19/2020 15:27   Ref. Range 8/19/2020 13:35   Sodium Latest Ref Range: 133 - 144 mmol/L 140   Potassium Latest Ref Range: 3.4 - 5.3 mmol/L 3.5   Chloride Latest Ref Range: 94 - 109 mmol/L 106   Carbon Dioxide Latest Ref Range: 20 - 32 mmol/L 28    Urea Nitrogen Latest Ref Range: 7 - 30 mg/dL 21   Creatinine Latest Ref Range: 0.66 - 1.25 mg/dL 0.51 (L)   GFR Estimate Latest Ref Range: >60 mL/min/1.73_m2 >90   GFR Estimate If Black Latest Ref Range: >60 mL/min/1.73_m2 >90   Calcium Latest Ref Range: 8.5 - 10.1 mg/dL 8.6   Anion Gap Latest Ref Range: 3 - 14 mmol/L 6   Albumin Latest Ref Range: 3.4 - 5.0 g/dL 3.5   Protein Total Latest Ref Range: 6.8 - 8.8 g/dL 6.9   Bilirubin Total Latest Ref Range: 0.2 - 1.3 mg/dL 1.8 (H)   Alkaline Phosphatase Latest Ref Range: 40 - 150 U/L 96   ALT Latest Ref Range: 0 - 70 U/L 221 (H)   AST Latest Ref Range: 0 - 45 U/L 50 (H)   Uric Acid Latest Ref Range: 3.5 - 7.2 mg/dL 1.4 (L)   Glucose Latest Ref Range: 70 - 99 mg/dL 112 (H)   WBC Latest Ref Range: 4.0 - 11.0 10e9/L 0.7 (LL)   Hemoglobin Latest Ref Range: 13.3 - 17.7 g/dL 8.8 (L)   Hematocrit Latest Ref Range: 40.0 - 53.0 % 26.5 (L)   Platelet Count Latest Ref Range: 150 - 450 10e9/L 28 (LL)   RBC Count Latest Ref Range: 4.4 - 5.9 10e12/L 3.04 (L)   MCV Latest Ref Range: 78 - 100 fl 87   MCH Latest Ref Range: 26.5 - 33.0 pg 28.9   MCHC Latest Ref Range: 31.5 - 36.5 g/dL 33.2   RDW Latest Ref Range: 10.0 - 15.0 % 14.6   Diff Method Unknown Manual Differential   % Neutrophils Latest Units: % 48.3   % Lymphocytes Latest Units: % 41.2   % Monocytes Latest Units: % 10.5   % Eosinophils Latest Units: % 0.0   % Basophils Latest Units: % 0.0   Nucleated RBCs Latest Ref Range: 0 /100 4 (H)   Absolute Neutrophil Latest Ref Range: 1.6 - 8.3 10e9/L 0.3 (LL)   Absolute Lymphocytes Latest Ref Range: 0.8 - 5.3 10e9/L 0.3 (L)   Absolute Monocytes Latest Ref Range: 0.0 - 1.3 10e9/L 0.1   Absolute Eosinophils Latest Ref Range: 0.0 - 0.7 10e9/L 0.0   Absolute Basophils Latest Ref Range: 0.0 - 0.2 10e9/L 0.0   Absolute Nucleated RBC Unknown 0.0   Anisocytosis Unknown Slight   Platelet Estimate Unknown Confirming automated cell count       Assessment/Plan:  Artemio Trung is a  20 year old  male with 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 He is now Day +23 from Kymriah therapy.  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 He ws recently discharged on 8/14 following admission for fever and Kymriah infusion (7/24-8/14) following outpatient lymphodepleting chemotherapy per [id number] with cytoxan and fludarabine 7/21-7/24 completed as an outpatient. His course was complicated by grade 3 CRS with hypotension which developed on 8/6 requiring an NS bolus and initiation of dopamine, and administration of tocilizumab x3 (8/6, 8/7, 8/8). Isael also experienced CAR-T neurotoxicity which began 8/6 with headache and vomiting which progressed.     Artemio has been clinically well since hospital discharge. Appetite improving but requires nutritional support with TPN. CRS and neurotoxicity resolved, s/p prednisone as of 8/18. Most recent platelet transfusion on 8/17 with mild hives on chin.     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 Relapsed 6/5/2020, 19 months status post matched related BMT per [id number] (cytoxan/TBI arm) with CD19 (dim) relapsed b-cell lymphoblastic leukemia. Completed apheresis collection in [facility] (6/2020). Bridging chemotherapy with oral 6MP, methotrexate and ruxolitinib. All oral chemotherapy stopped on 7/12. BM revealed 75% involvement with leukemia. CSF was negative.   - Outpatient lymphodepleting chemotherapy per [id number] with cytoxan and fludarabine 7/21-7/24 completed as an outpatient. Kymriah infusion inpatient 7/27     # CRS: Now resolved. date of onset: 7/28/20, initial grade 2 with max grade 3 (on 8/6).   - 7/28: IV Antibiotics  - 8/6: One 500 mL fluid bolus, dopamine 2 mcg/kg/min, increased dopamine to 7 mcg/kg/min. Gave 1 dose of Tociluzimab. IV fluids 100 mL/hr.  - 8/7: 2nd dose of Tociluzimab  - 8/8: 3rd dose of Tociluzimab and initiated  Methylprednisolone  - 8/9-10 Continue methylprednisolone Q12H  - 8/11: wean methylpred to 0.5 mg/kg Q12h   - 8/12: vasopressors off, afebrile, intermittent bradycardia    # CAR-T Neurotoxicity: Now Resolved. 8/6 developed neck pain, headache and vomiting which worsened over 48 hours requiring narcotics. Initiated Methylprednisolone Q12H on 8/8. Methylpred weaned 8/11 (see above). Symptoms resolved and transitioned to prednisone 8/12. Morphine stopped 8/13. Current Neurotoxicity score: 10. Wean completed. Steroids discontinued 8/18.     FEN/Renal:   # Risk for malnutrition: Intake improving significantly since discharge.   - Continue nutritional support with TPN, off lipids as of 8/18.   - Dietician to see tomorrow.    # Risk for electrolyte abnormalities:  - Check electrolytes in clinic and adjust in TPN as needed     # Risk for renal dysfunction/risk for ELIZ: GFR (7/16) 133ml/min   - Monitor weights and labs      # Risk for TLS:   - Continue to monitor uric acid and phos. Restart allopurinol if uric acid > 2.5.       Pulmonary:  # Risk for pulmonary insufficiency: No current concerns. Normal chest and sinus CTs during work-up.      Cardiovascular:  # Aaron-Parkinson-White Syndrome: diagnosed upon syncope in 02/2018. Asymptomatic, no interventions to date. No current concerns. EKG (7/15) revealed WPW with normal sinus rhythm. Echocardiogram (7/15) showed LVEF of 62%. EKG (8/12) showed resolution of WPW.     # Asymptomatic bradycardia: Titrated off dopamine on 8/11, subsequent day experienced asymptomatic bradycardia; confirmed sinus bradycardia on EKG.  Now resolved.     # Hypotension related to CRS: s/p dopamine. Resolved    Heme:   # Pancytopenia secondary to chemotherapy:   - Transfuse for hemoglobin < 7, platelets < 20,000 (increased 8/6 due to mild epistaxis & hematemesis with streaks of blood)-- last given . Platelets given 8/17 for plt count of 17K. Mild hives on chin reported -- will continue to monitor  subsequent transfusions.    -  Mild hives noted at end of pRBC transfusion on 7/25. Premedicate with Tylenol and Benadryl for pRBC transfusions.   - Recent history of significant hives with platelet transfusions. Was being premedicated with hydrocortisone, tylenol, and benadryl for platelets. However, no hydrocortisone premedication ordered now given CAR-T.   - No GCSF per protocol     # Coagulopathy related to CRS:  No active signs of bleeding. He received numerous doses of cyro (8/9-11). Coagulopathy labs improving, 8/17- INR 1.23, hepzymed PTT 26, fibrinogen 140, D-Dimer 1.5  - Continue to monitor per protocol     Infectious Disease:  # Risk for infection given immunocompromised status with need for prophylaxis:           - Viral: HD Valtrex continue through Day +30. CMV PCR ordered for tomorrow.   - Fungal: Micafungin was discontinued at discharge.  Will begin fluconazole once transaminitis resolves.   - History of photoxic drug eruption with voriconazole, avoid use.   - Bacterial: Levofloxacin 500 mg daily until ANC > 1000.  - PCP: Bactrim to begin day +28 (next week)    # Recent Febrile Neutropenia (7/24): thought to be related to CRS. Blood Cx NGTD and COVID neg 7/24. S/p Vanco and Cefepime.      GI:   # Nausea, stable after starting scheduled zofran. Intermittent nausea throughout the day ongoing.    -  Zofran q6H     # Acid reflux: Protonix BID, TUMS prn     # Recent Transaminitis:  Possibly related to recent chemotherapy.  ALT stably elevated today.   - Continue to monitor.       Oral Hygiene:   # Receding gums, risk for infection  - Continue chlorhexidine mouth wash for symptomatic control, pain now improved     Fertility:  Sperm collected pre chemotherapy for fertility preservation    Discharge Considerations: Expected lengths of hospitalization for patients undergoing stem cell transplantation vary by primary diagnosis, conditioning regimen, graft source, and development of complications. A typical stay  is 6 weeks.    BENJA Rose-AC  [facility] Blood and Marrow Transplant  [facility]'[address] [address]  [phone]  [phone]    Patient Active Problem List   Diagnosis     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)     Aaron-Parkinson-White (WPW) syndrome     Bone marrow transplant candidate     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)     At risk for infection     S/P allogeneic bone marrow transplant (H)     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

## 2020-08-19 NOTE — PROGRESS NOTES
This is a recent snapshot of the patient's [facility] medical record.  For current drug dose and complete information and questions, call [phone] or In Basket pool, [facility] home infusion ([id number])  CSN Number:  [id number]

## 2020-08-20 ENCOUNTER — ONCOLOGY VISIT (OUTPATIENT)
Dept: TRANSPLANT | Facility: CLINIC | Age: 20
End: 2020-08-20
Attending: NURSE PRACTITIONER
Payer: COMMERCIAL

## 2020-08-20 ENCOUNTER — ALLIED HEALTH/NURSE VISIT (OUTPATIENT)
Dept: TRANSPLANT | Facility: CLINIC | Age: 20
End: 2020-08-20
Attending: PEDIATRICS
Payer: COMMERCIAL

## 2020-08-20 ENCOUNTER — INFUSION THERAPY VISIT (OUTPATIENT)
Dept: INFUSION THERAPY | Facility: CLINIC | Age: 20
End: 2020-08-20
Attending: PEDIATRICS
Payer: COMMERCIAL

## 2020-08-20 ENCOUNTER — HOME INFUSION (PRE-WILLOW HOME INFUSION) (OUTPATIENT)
Dept: PHARMACY | Facility: CLINIC | Age: 20
End: 2020-08-20

## 2020-08-20 VITALS
DIASTOLIC BLOOD PRESSURE: 71 MMHG | OXYGEN SATURATION: 100 % | SYSTOLIC BLOOD PRESSURE: 110 MMHG | RESPIRATION RATE: 16 BRPM | HEART RATE: 91 BPM | TEMPERATURE: 98.6 F | WEIGHT: 136.24 LBS | BODY MASS INDEX: 20.34 KG/M2

## 2020-08-20 DIAGNOSIS — C91.00 ALL (ACUTE LYMPHOBLASTIC LEUKEMIA OF INFANT) (H): ICD-10-CM

## 2020-08-20 DIAGNOSIS — C91.02 ACUTE LYMPHOBLASTIC LEUKEMIA (ALL) IN RELAPSE (H): Primary | ICD-10-CM

## 2020-08-20 DIAGNOSIS — Z94.81 S/P ALLOGENEIC BONE MARROW TRANSPLANT (H): ICD-10-CM

## 2020-08-20 LAB
ALBUMIN SERPL-MCNC: 3.6 G/DL (ref 3.4–5)
ALP SERPL-CCNC: 90 U/L (ref 40–150)
ALT SERPL W P-5'-P-CCNC: 197 U/L (ref 0–70)
ANION GAP SERPL CALCULATED.3IONS-SCNC: 4 MMOL/L (ref 3–14)
ANISOCYTOSIS BLD QL SMEAR: ABNORMAL
AST SERPL W P-5'-P-CCNC: 46 U/L (ref 0–45)
BASOPHILS # BLD AUTO: 0 10E9/L (ref 0–0.2)
BASOPHILS NFR BLD AUTO: 0.4 %
BILIRUB SERPL-MCNC: 2 MG/DL (ref 0.2–1.3)
BUN SERPL-MCNC: 21 MG/DL (ref 7–30)
CALCIUM SERPL-MCNC: 8.6 MG/DL (ref 8.5–10.1)
CHLORIDE SERPL-SCNC: 105 MMOL/L (ref 94–109)
CO2 SERPL-SCNC: 29 MMOL/L (ref 20–32)
CREAT SERPL-MCNC: 0.54 MG/DL (ref 0.66–1.25)
DIFFERENTIAL METHOD BLD: ABNORMAL
EOSINOPHIL # BLD AUTO: 0 10E9/L (ref 0–0.7)
EOSINOPHIL NFR BLD AUTO: 1.3 %
ERYTHROCYTE [DISTWIDTH] IN BLOOD BY AUTOMATED COUNT: 14.4 % (ref 10–15)
GFR SERPL CREATININE-BSD FRML MDRD: >90 ML/MIN/{1.73_M2}
GLUCOSE SERPL-MCNC: 113 MG/DL (ref 70–99)
HCT VFR BLD AUTO: 25.9 % (ref 40–53)
HGB BLD-MCNC: 8.6 G/DL (ref 13.3–17.7)
LYMPHOCYTES # BLD AUTO: 0.2 10E9/L (ref 0.8–5.3)
LYMPHOCYTES NFR BLD AUTO: 35.9 %
MAGNESIUM SERPL-MCNC: 1.9 MG/DL (ref 1.6–2.3)
MCH RBC QN AUTO: 29 PG (ref 26.5–33)
MCHC RBC AUTO-ENTMCNC: 33.2 G/DL (ref 31.5–36.5)
MCV RBC AUTO: 87 FL (ref 78–100)
MICROCYTES BLD QL SMEAR: PRESENT
MONOCYTES # BLD AUTO: 0.1 10E9/L (ref 0–1.3)
MONOCYTES NFR BLD AUTO: 13.9 %
MYELOCYTES # BLD: 0 10E9/L
MYELOCYTES NFR BLD MANUAL: 0.4 %
NEUTROPHILS # BLD AUTO: 0.3 10E9/L (ref 1.6–8.3)
NEUTROPHILS NFR BLD AUTO: 48.1 %
NRBC # BLD AUTO: 0 10*3/UL
NRBC BLD AUTO-RTO: 0 /100
PHOSPHATE SERPL-MCNC: 4.6 MG/DL (ref 2.5–4.5)
PLATELET # BLD AUTO: 22 10E9/L (ref 150–450)
PLATELET # BLD EST: ABNORMAL 10*3/UL
POIKILOCYTOSIS BLD QL SMEAR: SLIGHT
POTASSIUM SERPL-SCNC: 4 MMOL/L (ref 3.4–5.3)
PROT SERPL-MCNC: 6.8 G/DL (ref 6.8–8.8)
RBC # BLD AUTO: 2.97 10E12/L (ref 4.4–5.9)
SODIUM SERPL-SCNC: 138 MMOL/L (ref 133–144)
WBC # BLD AUTO: 0.6 10E9/L (ref 4–11)

## 2020-08-20 PROCEDURE — 97803 MED NUTRITION INDIV SUBSEQ: CPT | Mod: ZF | Performed by: DIETITIAN, REGISTERED

## 2020-08-20 PROCEDURE — 25000128 H RX IP 250 OP 636: Mod: ZF | Performed by: PEDIATRICS

## 2020-08-20 PROCEDURE — 83735 ASSAY OF MAGNESIUM: CPT | Performed by: NURSE PRACTITIONER

## 2020-08-20 PROCEDURE — 80053 COMPREHEN METABOLIC PANEL: CPT | Performed by: NURSE PRACTITIONER

## 2020-08-20 PROCEDURE — 84100 ASSAY OF PHOSPHORUS: CPT | Performed by: NURSE PRACTITIONER

## 2020-08-20 PROCEDURE — G0463 HOSPITAL OUTPT CLINIC VISIT: HCPCS

## 2020-08-20 PROCEDURE — 36592 COLLECT BLOOD FROM PICC: CPT

## 2020-08-20 PROCEDURE — 85025 COMPLETE CBC W/AUTO DIFF WBC: CPT | Performed by: NURSE PRACTITIONER

## 2020-08-20 RX ORDER — ACETAMINOPHEN 325 MG/1
650 TABLET ORAL ONCE
Status: CANCELLED
Start: 2020-08-20

## 2020-08-20 RX ORDER — DIPHENHYDRAMINE HYDROCHLORIDE 50 MG/ML
50 INJECTION INTRAMUSCULAR; INTRAVENOUS EVERY 6 HOURS PRN
Status: CANCELLED
Start: 2020-08-20

## 2020-08-20 RX ORDER — HEPARIN SODIUM,PORCINE 10 UNIT/ML
VIAL (ML) INTRAVENOUS
Status: DISCONTINUED
Start: 2020-08-20 | End: 2020-08-20 | Stop reason: HOSPADM

## 2020-08-20 RX ORDER — HEPARIN SODIUM,PORCINE 10 UNIT/ML
5-10 VIAL (ML) INTRAVENOUS EVERY 24 HOURS
Status: DISCONTINUED | OUTPATIENT
Start: 2020-08-20 | End: 2020-08-20 | Stop reason: HOSPADM

## 2020-08-20 RX ADMIN — HEPARIN, PORCINE (PF) 10 UNIT/ML INTRAVENOUS SYRINGE 5 ML: at 13:38

## 2020-08-20 ASSESSMENT — PAIN SCALES - GENERAL: PAINLEVEL: NO PAIN (0)

## 2020-08-20 NOTE — PHARMACY-CONSULT NOTE
Discharge TPN Monitoring     Isael's TPN formula, labs, and nutritional status were reviewed today.        Discussed with Mitra Shin NP and communicated to [facility].     Isael will return to clinic on Friday 8/21/20 for labs and reassessment.     The following reflects the current TPN formula:  Dosing Weight: 58.4 kg  Volume: 600 mL (was 1200 ml), cycled over 12 hours  Protein: 44 grams/day (was 88 g/day )  Dextrose: 100 gm (was 210 g)  SMOF Lipids: none     Sodium: 20 mEq/day (was 45 mEq/day)  Potassium: 10 mEq/day (was 18 mEq/day)  Calcium: 5 mEq/day (was 10 mEq/day)  Magnesium: 0 mEq/day (Was 5 mEq/day)  Phosphorus:  20 mMol/day (was 40 mMol/day)  Chloride:Acetate ratio: 1:2 (best max chloride)  Trace elements with Selenium: standard  Multivitamins: standard  Vitamin K: 0 (was 10 mg)     Pharmacy will continue to follow.  Francisca Culp, PharmD

## 2020-08-20 NOTE — PROGRESS NOTES
This is a recent snapshot of the patient's [facility] medical record.  For current drug dose and complete information and questions, call [phone] or In Basket pool, fv home infusion ([id number])  CSN Number:  [id number]

## 2020-08-20 NOTE — PROGRESS NOTES
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 He was discharged on 8/14 following his admission for febrile neutropenia and Kymriah infusion. His course was complicated by grade 3 CRS with hypotension and CAR-T neurotoxicity requiring 3 doses of tociluzimab, fluid boluses, dopamine, and steroids.     Glen continues to feel well without complaints today. No fevers or malaise. Eating about 80% of baseline per report. No nausea or bowel dysregulation- tolerating medications. Gaining weight nicely. No headaches, active bleeding, or rashes. Continues with a positive demeanor.    Review of Systems: Pertinent positives include those mentioned in interval events. A complete review of systems was performed and is otherwise negative.      Medications:  Please see MAR    Physical Exam:    Vital Signs for Peds 8/20/2020   SYSTOLIC 110   DIASTOLIC 71   PULSE 91   TEMPERATURE 98.6   RESPIRATIONS 16   WEIGHT (kg) 61.8 kg   HEIGHT (cm)    BMI    pain    O2 100     GEN: Sitting on infusion bed, pleasant and cooperative. NAD. Mother present.   HEENT: Full head of hair, NC/AT, sclerae anicteric, nares patent without rhinorrhea, OP clear without lesions, MMM.   CARD: RRR, normal S1/S2 without murmur. Cap refill < 2 seconds.  RESP: CTAB, good air entry throughout, no adventitious lung sounds.   ABD: Soft, nontender, nondistended. No organomegaly appreciated.   EXTREM: WWP, no edema.   SKIN: Pale, no bruises, rashes, or lesions present.  NEURO: DL Galvan to left chest C/D/I, right port unaccessed    Labs:  Results for GLEN HAMMER (MRN [id number]) as of 8/20/2020 14:57   Ref. Range 8/20/2020 13:00   Sodium Latest Ref Range: 133 - 144 mmol/L 138   Potassium Latest Ref Range: 3.4 - 5.3 mmol/L 4.0   Chloride Latest Ref Range: 94 - 109 mmol/L 105   Carbon Dioxide Latest Ref Range: 20 - 32 mmol/L 29   Urea Nitrogen Latest Ref Range: 7 - 30 mg/dL  21   Creatinine Latest Ref Range: 0.66 - 1.25 mg/dL 0.54 (L)   GFR Estimate Latest Ref Range: >60 mL/min/1.73_m2 >90   GFR Estimate If Black Latest Ref Range: >60 mL/min/1.73_m2 >90   Calcium Latest Ref Range: 8.5 - 10.1 mg/dL 8.6   Anion Gap Latest Ref Range: 3 - 14 mmol/L 4   Magnesium Latest Ref Range: 1.6 - 2.3 mg/dL 1.9   Phosphorus Latest Ref Range: 2.5 - 4.5 mg/dL 4.6 (H)   Albumin Latest Ref Range: 3.4 - 5.0 g/dL 3.6   Protein Total Latest Ref Range: 6.8 - 8.8 g/dL 6.8   Bilirubin Total Latest Ref Range: 0.2 - 1.3 mg/dL 2.0 (H)   Alkaline Phosphatase Latest Ref Range: 40 - 150 U/L 90   ALT Latest Ref Range: 0 - 70 U/L 197 (H)   AST Latest Ref Range: 0 - 45 U/L 46 (H)   Glucose Latest Ref Range: 70 - 99 mg/dL 113 (H)   WBC Latest Ref Range: 4.0 - 11.0 10e9/L 0.6 (LL)   Hemoglobin Latest Ref Range: 13.3 - 17.7 g/dL 8.6 (L)   Hematocrit Latest Ref Range: 40.0 - 53.0 % 25.9 (L)   Platelet Count Latest Ref Range: 150 - 450 10e9/L 22 (LL)   RBC Count Latest Ref Range: 4.4 - 5.9 10e12/L 2.97 (L)   MCV Latest Ref Range: 78 - 100 fl 87   MCH Latest Ref Range: 26.5 - 33.0 pg 29.0   MCHC Latest Ref Range: 31.5 - 36.5 g/dL 33.2   RDW Latest Ref Range: 10.0 - 15.0 % 14.4   Diff Method Unknown Manual Differential   % Neutrophils Latest Units: % 48.1   % Lymphocytes Latest Units: % 35.9   % Monocytes Latest Units: % 13.9   % Eosinophils Latest Units: % 1.3   % Basophils Latest Units: % 0.4   % Myelocytes Latest Units: % 0.4   Nucleated RBCs Latest Ref Range: 0 /100 0   Absolute Neutrophil Latest Ref Range: 1.6 - 8.3 10e9/L 0.3 (LL)   Absolute Lymphocytes Latest Ref Range: 0.8 - 5.3 10e9/L 0.2 (L)   Absolute Monocytes Latest Ref Range: 0.0 - 1.3 10e9/L 0.1   Absolute Eosinophils Latest Ref Range: 0.0 - 0.7 10e9/L 0.0   Absolute Basophils Latest Ref Range: 0.0 - 0.2 10e9/L 0.0   Absolute Myelocytes Latest Ref Range: 0 10e9/L 0.0   Absolute Nucleated RBC Unknown 0.0   Anisocytosis Unknown Moderate   Poikilocytosis Unknown  Slight   Microcytes Unknown Present   Platelet Estimate Unknown Decreased     Assessment/Plan:  Artemio Nino is a  20 year old male with 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 He is now Day +24 from Kymriah therapy.  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 He ws recently discharged on 8/14 following admission for fever and Kymriah infusion (7/24-8/14) following outpatient lymphodepleting chemotherapy per MT2017-45 with cytoxan and fludarabine 7/21-7/24 completed as an outpatient. His course was complicated by grade 3 CRS with hypotension which developed on 8/6 requiring an NS bolus and initiation of dopamine, and administration of tocilizumab x3 (8/6, 8/7, 8/8). Isael also experienced CAR-T neurotoxicity which began 8/6 with headache and vomiting which progressed.     Artemio has been clinically well since hospital discharge. Appetite improving but requires nutritional support with TPN. CRS and neurotoxicity resolved, s/p prednisone as of 8/18. Most recent platelet transfusion on 8/17 with mild hives on chin.     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 Relapsed 6/5/2020, 19 months status post matched related BMT per [id number] (cytoxan/TBI arm) with CD19 (dim) relapsed b-cell lymphoblastic leukemia. Completed apheresis collection in the [facility] (6/2020). Bridging chemotherapy with oral 6MP, methotrexate and ruxolitinib. All oral chemotherapy stopped on 7/12. BM revealed 75% involvement with leukemia. CSF was negative.   - Outpatient lymphodepleting chemotherapy per [id number] with cytoxan and fludarabine 7/21-7/24 completed as an outpatient. Kymriah infusion inpatient 7/27     # CRS: Now resolved. date of onset: 7/28/20, initial grade 2 with max grade 3 (on 8/6).   - 7/28: IV Antibiotics  - 8/6: One 500 mL fluid bolus, dopamine 2 mcg/kg/min, increased dopamine to 7 mcg/kg/min. Gave  1 dose of Tociluzimab. IV fluids 100 mL/hr.  - 8/7: 2nd dose of Tociluzimab  - 8/8: 3rd dose of Tociluzimab and initiated Methylprednisolone  - 8/9-10 Continue methylprednisolone Q12H  - 8/11: wean methylpred to 0.5 mg/kg Q12h   - 8/12: vasopressors off, afebrile, intermittent bradycardia    # CAR-T Neurotoxicity: Now Resolved. 8/6 developed neck pain, headache and vomiting which worsened over 48 hours requiring narcotics. Initiated Methylprednisolone Q12H on 8/8. Methylpred weaned 8/11 (see above). Symptoms resolved and transitioned to prednisone 8/12. Morphine stopped 8/13. Current Neurotoxicity score: 10. Steroid wean completed, off since 8/18.     FEN/Renal:   # Risk for malnutrition: Intake improving significantly since discharge.   - Continue nutritional support with TPN, off lipids as of 8/18.   - Dietician visit today: Will decrease fluid and electrolytes in TPN- reassess tomorrow with goal to be off by Monday    # Risk for electrolyte abnormalities:  - Check electrolytes in clinic and adjust in TPN as needed     # Risk for renal dysfunction/risk for ELIZ: GFR (7/16) 133ml/min   - Monitor weights and labs      # Risk for TLS:   - Continue to monitor uric acid and phos. Restart allopurinol if uric acid > 2.5 (next due d+28)     Pulmonary:  # Risk for pulmonary insufficiency: No current concerns. Normal chest and sinus CTs during work-up.      Cardiovascular:  # Aaron-Parkinson-White Syndrome: diagnosed upon syncope in 02/2018. Asymptomatic, no interventions to date. No current concerns. EKG (7/15) revealed WPW with normal sinus rhythm. Echocardiogram (7/15) showed LVEF of 62%. EKG (8/12) showed resolution of WPW.     # Asymptomatic bradycardia: Titrated off dopamine on 8/11, subsequent day experienced asymptomatic bradycardia; confirmed sinus bradycardia on EKG.  Now resolved.     # Hypotension related to CRS: s/p dopamine. Resolved    Heme:   # Pancytopenia secondary to chemotherapy:   - Transfuse for  hemoglobin < 7. Decrease plt paramter from 20k (previously due to epistaxis & hematemesis with streaks of blood) to 10k. Mild hives on chin reported during most recent infusion 8/17 -- will continue to monitor subsequent transfusions.    -  Mild hives noted at end of pRBC transfusion on 7/25. Premedicate with Tylenol and Benadryl for pRBC transfusions.   - Recent history of significant hives with platelet transfusions. Was being premedicated with hydrocortisone, tylenol, and benadryl for platelets. However, no hydrocortisone premedication ordered now given CAR-T.   - No GCSF per protocol     # Coagulopathy related to CRS:  No active signs of bleeding. He received numerous doses of cyro (8/9-11). Coagulopathy labs improving, 8/17- INR 1.23, hepzymed PTT 26, fibrinogen 140, D-Dimer 1.5  - Continue to monitor per protocol     Infectious Disease:  # Risk for infection given immunocompromised status with need for prophylaxis:           - Viral: HD Valtrex continue through Day +30. CMV PCR next visit.   - Fungal: Micafungin was discontinued at discharge.  Will begin fluconazole once transaminitis resolves.   - History of photoxic drug eruption with voriconazole, avoid use.   - Bacterial: Levofloxacin 500 mg daily until ANC > 1000.  - PCP: Bactrim to begin day +28 (next week)    # Recent Febrile Neutropenia (7/24): thought to be related to CRS. Blood Cx NGTD and COVID neg 7/24. S/p Vanco and Cefepime.      GI:   # Nausea, stable after starting scheduled zofran. Intermittent nausea throughout the day ongoing.    -  Zofran q6H     # Acid reflux: Protonix, TUMS prn     # Recent Transaminitis:  Possibly related to recent chemotherapy. Improving  - Continue to monitor.       Oral Hygiene:   # Receding gums, risk for infection  - Continue chlorhexidine mouth wash for symptomatic control, pain now improved     Fertility:  Sperm collected pre chemotherapy for fertility preservation    Discharge Considerations: Expected lengths of  hospitalization for patients undergoing stem cell transplantation vary by primary diagnosis, conditioning regimen, graft source, and development of complications. A typical stay is 6 weeks.    BENJA Rose-LORENZA  [facility]  [address] [address]  [phone]  [phone]    Patient Active Problem List   Diagnosis     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)     Aaron-Parkinson-White (WPW) syndrome     Bone marrow transplant candidate     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)     At risk for infection     S/P allogeneic bone marrow transplant (H)     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

## 2020-08-20 NOTE — PROGRESS NOTES
Infusion Nursing Note    Artemio Nino Presents to [facility] today for:possible platelets.    Due to :    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)  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)  S/P allogeneic bone marrow transplant (H)    Intravenous Access/Labs: Labs drawn per nurse.     Infusion Note: Per ordered parameters no platelets needed today. Red lumen heparin locked. Provider in to see patient.     Discharge Plan:    Pt left [facility] in stable condition.

## 2020-08-21 ENCOUNTER — INFUSION THERAPY VISIT (OUTPATIENT)
Dept: INFUSION THERAPY | Facility: CLINIC | Age: 20
End: 2020-08-21
Attending: PEDIATRICS
Payer: COMMERCIAL

## 2020-08-21 ENCOUNTER — ONCOLOGY VISIT (OUTPATIENT)
Dept: TRANSPLANT | Facility: CLINIC | Age: 20
End: 2020-08-21
Attending: NURSE PRACTITIONER
Payer: COMMERCIAL

## 2020-08-21 ENCOUNTER — RESULTS ONLY (OUTPATIENT)
Dept: LAB | Age: 20
End: 2020-08-21

## 2020-08-21 ENCOUNTER — HOME INFUSION (PRE-WILLOW HOME INFUSION) (OUTPATIENT)
Dept: PHARMACY | Facility: CLINIC | Age: 20
End: 2020-08-21

## 2020-08-21 VITALS
WEIGHT: 134.7 LBS | DIASTOLIC BLOOD PRESSURE: 56 MMHG | TEMPERATURE: 98.2 F | HEART RATE: 98 BPM | RESPIRATION RATE: 16 BRPM | OXYGEN SATURATION: 99 % | SYSTOLIC BLOOD PRESSURE: 88 MMHG | BODY MASS INDEX: 20.11 KG/M2

## 2020-08-21 DIAGNOSIS — C91.00 ALL (ACUTE LYMPHOBLASTIC LEUKEMIA OF INFANT) (H): ICD-10-CM

## 2020-08-21 DIAGNOSIS — C91.02 ACUTE LYMPHOBLASTIC LEUKEMIA (ALL) IN RELAPSE (H): Primary | ICD-10-CM

## 2020-08-21 DIAGNOSIS — Z11.59 ENCOUNTER FOR SCREENING FOR OTHER VIRAL DISEASES: ICD-10-CM

## 2020-08-21 DIAGNOSIS — Z94.81 S/P ALLOGENEIC BONE MARROW TRANSPLANT (H): ICD-10-CM

## 2020-08-21 LAB
ALBUMIN SERPL-MCNC: 3.3 G/DL (ref 3.4–5)
ALP SERPL-CCNC: 80 U/L (ref 40–150)
ALT SERPL W P-5'-P-CCNC: 164 U/L (ref 0–70)
ANION GAP SERPL CALCULATED.3IONS-SCNC: 5 MMOL/L (ref 3–14)
ANISOCYTOSIS BLD QL SMEAR: SLIGHT
AST SERPL W P-5'-P-CCNC: 33 U/L (ref 0–45)
BASOPHILS # BLD AUTO: 0 10E9/L (ref 0–0.2)
BASOPHILS NFR BLD AUTO: 0.9 %
BILIRUB SERPL-MCNC: 1.7 MG/DL (ref 0.2–1.3)
BLD PROD TYP BPU: NORMAL
BLD PROD TYP BPU: NORMAL
BLD UNIT ID BPU: 0
BLD UNIT ID BPU: 0
BLOOD PRODUCT CODE: NORMAL
BLOOD PRODUCT CODE: NORMAL
BPU ID: NORMAL
BPU ID: NORMAL
BUN SERPL-MCNC: 24 MG/DL (ref 7–30)
CALCIUM SERPL-MCNC: 8.7 MG/DL (ref 8.5–10.1)
CHLORIDE SERPL-SCNC: 106 MMOL/L (ref 94–109)
CO2 SERPL-SCNC: 28 MMOL/L (ref 20–32)
CREAT SERPL-MCNC: 0.56 MG/DL (ref 0.66–1.25)
DIFFERENTIAL METHOD BLD: ABNORMAL
EOSINOPHIL # BLD AUTO: 0 10E9/L (ref 0–0.7)
EOSINOPHIL NFR BLD AUTO: 0 %
ERYTHROCYTE [DISTWIDTH] IN BLOOD BY AUTOMATED COUNT: 14.6 % (ref 10–15)
GFR SERPL CREATININE-BSD FRML MDRD: >90 ML/MIN/{1.73_M2}
GLUCOSE SERPL-MCNC: 136 MG/DL (ref 70–99)
GRAM STN SPEC: NORMAL
GRAM STN SPEC: NORMAL
HCT VFR BLD AUTO: 24.5 % (ref 40–53)
HGB BLD-MCNC: 8.1 G/DL (ref 13.3–17.7)
LABORATORY COMMENT REPORT: NORMAL
LYMPHOCYTES # BLD AUTO: 0.1 10E9/L (ref 0.8–5.3)
LYMPHOCYTES NFR BLD AUTO: 21.1 %
Lab: NORMAL
MACROCYTES BLD QL SMEAR: PRESENT
MAGNESIUM SERPL-MCNC: 1.7 MG/DL (ref 1.6–2.3)
MCH RBC QN AUTO: 29.2 PG (ref 26.5–33)
MCHC RBC AUTO-ENTMCNC: 33.1 G/DL (ref 31.5–36.5)
MCV RBC AUTO: 88 FL (ref 78–100)
MICROCYTES BLD QL SMEAR: PRESENT
MONOCYTES # BLD AUTO: 0.1 10E9/L (ref 0–1.3)
MONOCYTES NFR BLD AUTO: 7.9 %
NEUTROPHILS # BLD AUTO: 0.5 10E9/L (ref 1.6–8.3)
NEUTROPHILS NFR BLD AUTO: 70.1 %
NRBC # BLD AUTO: 0 10*3/UL
NRBC BLD AUTO-RTO: 2 /100
PHOSPHATE SERPL-MCNC: 5.1 MG/DL (ref 2.5–4.5)
PLATELET # BLD AUTO: 17 10E9/L (ref 150–450)
PLATELET # BLD EST: ABNORMAL 10*3/UL
POTASSIUM SERPL-SCNC: 4 MMOL/L (ref 3.4–5.3)
PROT SERPL-MCNC: 6.4 G/DL (ref 6.8–8.8)
RBC # BLD AUTO: 2.77 10E12/L (ref 4.4–5.9)
SARS-COV-2 RNA SPEC QL NAA+PROBE: NEGATIVE
SARS-COV-2 RNA SPEC QL NAA+PROBE: NORMAL
SODIUM SERPL-SCNC: 139 MMOL/L (ref 133–144)
SPECIMEN SOURCE: NORMAL
TRANSFUSION RXN BLOOD BANK NOTIFICATION: NORMAL
TRANSFUSION STATUS PATIENT QL: NORMAL
URATE SERPL-MCNC: 2.2 MG/DL (ref 3.5–7.2)
WBC # BLD AUTO: 0.7 10E9/L (ref 4–11)
YEAST SPEC QL CULT: NO GROWTH
YEAST SPEC QL CULT: NO GROWTH

## 2020-08-21 PROCEDURE — 36430 TRANSFUSION BLD/BLD COMPNT: CPT

## 2020-08-21 PROCEDURE — 40000341 ZZHCL STATISTIC BB TRANSF RXN INVEST: Performed by: NURSE PRACTITIONER

## 2020-08-21 PROCEDURE — P9037 PLATE PHERES LEUKOREDU IRRAD: HCPCS | Performed by: NURSE PRACTITIONER

## 2020-08-21 PROCEDURE — 80053 COMPREHEN METABOLIC PANEL: CPT | Performed by: NURSE PRACTITIONER

## 2020-08-21 PROCEDURE — 83735 ASSAY OF MAGNESIUM: CPT | Performed by: NURSE PRACTITIONER

## 2020-08-21 PROCEDURE — 25000132 ZZH RX MED GY IP 250 OP 250 PS 637: Mod: ZF

## 2020-08-21 PROCEDURE — 84100 ASSAY OF PHOSPHORUS: CPT | Performed by: NURSE PRACTITIONER

## 2020-08-21 PROCEDURE — 85025 COMPLETE CBC W/AUTO DIFF WBC: CPT | Performed by: NURSE PRACTITIONER

## 2020-08-21 PROCEDURE — 96374 THER/PROPH/DIAG INJ IV PUSH: CPT

## 2020-08-21 PROCEDURE — 84550 ASSAY OF BLOOD/URIC ACID: CPT | Performed by: NURSE PRACTITIONER

## 2020-08-21 PROCEDURE — U0003 INFECTIOUS AGENT DETECTION BY NUCLEIC ACID (DNA OR RNA); SEVERE ACUTE RESPIRATORY SYNDROME CORONAVIRUS 2 (SARS-COV-2) (CORONAVIRUS DISEASE [COVID-19]), AMPLIFIED PROBE TECHNIQUE, MAKING USE OF HIGH THROUGHPUT TECHNOLOGIES AS DESCRIBED BY CMS-2020-01-R: HCPCS | Performed by: NURSE PRACTITIONER

## 2020-08-21 PROCEDURE — 25000128 H RX IP 250 OP 636: Mod: ZF

## 2020-08-21 RX ORDER — ACETAMINOPHEN 325 MG/1
TABLET ORAL
Status: COMPLETED
Start: 2020-08-21 | End: 2020-08-21

## 2020-08-21 RX ORDER — DIPHENHYDRAMINE HYDROCHLORIDE 50 MG/ML
INJECTION INTRAMUSCULAR; INTRAVENOUS
Status: COMPLETED
Start: 2020-08-21 | End: 2020-08-21

## 2020-08-21 RX ORDER — HEPARIN SODIUM,PORCINE 10 UNIT/ML
5 VIAL (ML) INTRAVENOUS
Status: CANCELLED
Start: 2020-08-21

## 2020-08-21 RX ORDER — HEPARIN SODIUM,PORCINE 10 UNIT/ML
VIAL (ML) INTRAVENOUS
Status: DISCONTINUED
Start: 2020-08-21 | End: 2020-08-21 | Stop reason: HOSPADM

## 2020-08-21 RX ORDER — HEPARIN SODIUM (PORCINE) LOCK FLUSH IV SOLN 100 UNIT/ML 100 UNIT/ML
3 SOLUTION INTRAVENOUS
Status: CANCELLED
Start: 2020-08-21

## 2020-08-21 RX ORDER — HEPARIN SODIUM,PORCINE 10 UNIT/ML
5 VIAL (ML) INTRAVENOUS
Status: DISCONTINUED | OUTPATIENT
Start: 2020-08-21 | End: 2020-08-21 | Stop reason: HOSPADM

## 2020-08-21 RX ORDER — HEPARIN SODIUM,PORCINE 10 UNIT/ML
5 VIAL (ML) INTRAVENOUS
Status: CANCELLED | OUTPATIENT
Start: 2020-08-21

## 2020-08-21 RX ORDER — HEPARIN SODIUM,PORCINE 10 UNIT/ML
VIAL (ML) INTRAVENOUS
Status: COMPLETED
Start: 2020-08-21 | End: 2020-08-21

## 2020-08-21 RX ORDER — ACETAMINOPHEN 325 MG/1
650 TABLET ORAL ONCE
Status: COMPLETED | OUTPATIENT
Start: 2020-08-21 | End: 2020-08-21

## 2020-08-21 RX ORDER — DIPHENHYDRAMINE HYDROCHLORIDE 50 MG/ML
50 INJECTION INTRAMUSCULAR; INTRAVENOUS EVERY 6 HOURS PRN
Status: DISCONTINUED | OUTPATIENT
Start: 2020-08-21 | End: 2020-08-21 | Stop reason: HOSPADM

## 2020-08-21 RX ADMIN — ACETAMINOPHEN 650 MG: 325 TABLET ORAL at 09:58

## 2020-08-21 RX ADMIN — DIPHENHYDRAMINE HYDROCHLORIDE 50 MG: 50 INJECTION INTRAMUSCULAR; INTRAVENOUS at 09:57

## 2020-08-21 RX ADMIN — Medication 5 ML: at 11:45

## 2020-08-21 RX ADMIN — DIPHENHYDRAMINE HYDROCHLORIDE 50 MG: 50 INJECTION, SOLUTION INTRAMUSCULAR; INTRAVENOUS at 09:57

## 2020-08-21 RX ADMIN — HEPARIN, PORCINE (PF) 10 UNIT/ML INTRAVENOUS SYRINGE 5 ML: at 11:45

## 2020-08-21 NOTE — PROGRESS NOTES
CLINICAL NUTRITION SERVICES - ASSESSMENT NOTE  Artemio Nino is a 20 year old male seen by dietitian per consult due to home PN.  Face time 15 minutes.     ANTHROPOMETRICS  Height/Length: 174 cm  Weight: 51.8 kg  BMI: 20 kg/m  Dosing weight: 58.4 kg  Comments:weight trending upwards even with stopping lipids earlier this week     CURRENT NUTRITION ORDERS  Diet:Regular     CURRENT NUTRITION SUPPORT   Parenteral Nutrition:  Type of Parenteral Access: Central  PN frequency: Cycle, 12 hour     PN of 1200 mLs, GIR of 5 mg/kg/min (210 g dextrose), 88 g protein, 1.5 g/kg protein to provide 1066 kcals (18 kcal/kg).  PN is meeting 60% of kcal needs and 100% of protein needs.     Intake/Tolerance: Isael is feeling much better and states he is eating well with no GI side effects.  He states that he could be doing a little better with drinking fluids.     Current factors affecting nutrition intake include: Side effects from treatment are improved     NEW FINDINGS:  Kymriah Day +25     LABS  Labs reviewed     MEDICATIONS  Medications reviewed     ASSESSED NUTRITION NEEDS:  Estimated Energy Needs: 30-35 kcal/kg  Estimated Protein Needs: 1.5 g/kg  Estimated Fluid Needs: 1 mL/kcal     NUTRITION DIAGNOSIS:  Predicted excessive nutrient intake related to improving appetite and po with current PN provisions exceeding needs.     INTERVENTIONS  Nutrition Prescription  Po to meet needs for weight maintenance     Implementation:  Meals/ Snack- eating well. No nutritional concerns. Parenteral Nutrition and Collaboration and Referral of Nutrition care- PN discussed with Isael, his mom and POLLO.  Since PO is much improved and no GI side effects, will cut PN in half today and reduce the electrolytes.  If maintains weight, labs WNL and taking good fluid intake plan to stop PN on Friday and if the weekend goes well, Isael will have his line removed on Monday.     Goals  1. Po to meet greater than 75% of needs  2. Weight maintenance     FOLLOW  UP/MONITORING  Food and Beverage intake- monitor po  Anthropometric measurements- monitor wt    Renetta Schwab, ROYAL, LD, [facility]  [phone]

## 2020-08-21 NOTE — PROGRESS NOTES
"Pediatric BMT Daily Progress Note  Date of Service: 8/21/20    Interval History: Glen continues to feel well with minimal complaints. No fevers, URI symptoms, nausea or diarrhea. He mentions some abdominal cramping yesterday that resolved without intervention. Still eating and drinking well. Electrolytes WNL while weaning TPN. No active bleeding or rashes.     ** Upon completion of his platelet transfusion today, Glen developed three small hives, one on his bottom lip and two on his forehead. No other symptoms of systemic reaction. He says this happens \"80% of the time\" with platelets despite premeds and they eventually resolved without intervention.     Review of Systems: Pertinent positives include those mentioned in interval events. A complete review of systems was performed and is otherwise negative.      Medications:  Please see MAR    Physical Exam:    Vital Signs for Peds 8/21/2020   SYSTOLIC 105   DIASTOLIC 68   PULSE 90   TEMPERATURE 98   RESPIRATIONS 18   WEIGHT (kg) 61.1 kg   HEIGHT (cm)    BMI    pain    O2 99     GEN: Sitting on infusion bed, pleasant and cooperative. NAD. Mother present.   HEENT: Full head of hair, NC/AT, sclerae anicteric, nares patent without rhinorrhea, OP clear without lesions, MMM.   CARD: RRR, normal S1/S2 without murmur. Cap refill < 2 seconds.  RESP: CTAB, good air entry throughout, no adventitious lung sounds.   ABD: Soft, nontender, nondistended. No organomegaly appreciated.   EXTREM: WWP, no edema.   SKIN: Pale, no bruises, rashes, or lesions present.  NEURO: DL Galvan to left chest C/D/I, right port unaccessed    Labs:  Results for GLEN HAMMER (MRN [id number]) as of 8/22/2020 10:24   Ref. Range 8/21/2020 08:49   Sodium Latest Ref Range: 133 - 144 mmol/L 139   Potassium Latest Ref Range: 3.4 - 5.3 mmol/L 4.0   Chloride Latest Ref Range: 94 - 109 mmol/L 106   Carbon Dioxide Latest Ref Range: 20 - 32 mmol/L 28   Urea Nitrogen Latest Ref Range: 7 - 30 mg/dL 24 "   Creatinine Latest Ref Range: 0.66 - 1.25 mg/dL 0.56 (L)   GFR Estimate Latest Ref Range: >60 mL/min/1.73_m2 >90   GFR Estimate If Black Latest Ref Range: >60 mL/min/1.73_m2 >90   Calcium Latest Ref Range: 8.5 - 10.1 mg/dL 8.7   Anion Gap Latest Ref Range: 3 - 14 mmol/L 5   Magnesium Latest Ref Range: 1.6 - 2.3 mg/dL 1.7   Phosphorus Latest Ref Range: 2.5 - 4.5 mg/dL 5.1 (H)   Albumin Latest Ref Range: 3.4 - 5.0 g/dL 3.3 (L)   Protein Total Latest Ref Range: 6.8 - 8.8 g/dL 6.4 (L)   Bilirubin Total Latest Ref Range: 0.2 - 1.3 mg/dL 1.7 (H)   Alkaline Phosphatase Latest Ref Range: 40 - 150 U/L 80   ALT Latest Ref Range: 0 - 70 U/L 164 (H)   AST Latest Ref Range: 0 - 45 U/L 33   Uric Acid Latest Ref Range: 3.5 - 7.2 mg/dL 2.2 (L)   Glucose Latest Ref Range: 70 - 99 mg/dL 136 (H)   WBC Latest Ref Range: 4.0 - 11.0 10e9/L 0.7 (LL)   Hemoglobin Latest Ref Range: 13.3 - 17.7 g/dL 8.1 (L)   Hematocrit Latest Ref Range: 40.0 - 53.0 % 24.5 (L)   Platelet Count Latest Ref Range: 150 - 450 10e9/L 17 (LL)   RBC Count Latest Ref Range: 4.4 - 5.9 10e12/L 2.77 (L)   MCV Latest Ref Range: 78 - 100 fl 88   MCH Latest Ref Range: 26.5 - 33.0 pg 29.2   MCHC Latest Ref Range: 31.5 - 36.5 g/dL 33.1   RDW Latest Ref Range: 10.0 - 15.0 % 14.6   Diff Method Unknown Manual Differential   % Neutrophils Latest Units: % 70.1   % Lymphocytes Latest Units: % 21.1   % Monocytes Latest Units: % 7.9   % Eosinophils Latest Units: % 0.0   % Basophils Latest Units: % 0.9   Nucleated RBCs Latest Ref Range: 0 /100 2 (H)   Absolute Neutrophil Latest Ref Range: 1.6 - 8.3 10e9/L 0.5 (L)   Absolute Lymphocytes Latest Ref Range: 0.8 - 5.3 10e9/L 0.1 (L)   Absolute Monocytes Latest Ref Range: 0.0 - 1.3 10e9/L 0.1   Absolute Eosinophils Latest Ref Range: 0.0 - 0.7 10e9/L 0.0   Absolute Basophils Latest Ref Range: 0.0 - 0.2 10e9/L 0.0   Absolute Nucleated RBC Unknown 0.0   Anisocytosis Unknown Slight   Microcytes Unknown Present   Macrocytes Unknown Present    Platelet Estimate Unknown Confirming automated cell count   CMV DNA Quantitation Specimen Unknown EDTA PLASMA   CMV Quant IU/mL Latest Ref Range: CMVND^CMV DNA Not Detected [IU]/mL CMV DNA Not Detected   Log IU/mL of CMVQNT Latest Ref Range: <2.1 Log_IU/mL Not Calculated     Assessment/Plan:  Artemio is a 20 year old male with VHR ALL now 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 He was discharged on 8/14 following his admission for febrile neutropenia and Kymriah infusion. His course was complicated by grade 3 CRS with hypotension and CAR-T neurotoxicity requiring 3 doses of tociluzimab, fluid boluses, dopamine, and steroids. He continues with clinical wellbeing in the outpatient setting.     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 Relapsed 6/5/2020, 19 months status post matched related BMT per [id number] (cytoxan/TBI arm) with CD19 (dim) relapsed b-cell lymphoblastic leukemia. Completed apheresis collection in the [facility] (6/2020). Bridging chemotherapy with oral 6MP, methotrexate and ruxolitinib. All oral chemotherapy stopped on 7/12. BM revealed 75% involvement with leukemia. CSF was negative. Outpatient lymphodepleting chemotherapy per [id number] with cytoxan and fludarabine 7/21-7/24 completed as an outpatient. Kymriah infusion inpatient 7/27. Awaiting count recovery.     # History of CRS: Date of onset: 7/28/20, initial grade 2 with max grade 3 (8/6). S/p IVF, dopamine, tociluzimab x 3, and Methylprednisolone. Resolved.     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 S/p narctoics and methylprednisolone with symptom resolution. Steroid wean completed, off since 8/18.     FEN/Renal:   # Risk for malnutrition: Intake improving significantly since discharge.   - Discontinue TPN today    # Risk for electrolyte abnormalities:  - Repeat lytes with magnesium and phos Sunday  - Start neutraphos tomorrow given large amount of phosphorous  in TPN. Adjust as needed.      # Risk for renal dysfunction/risk for ELIZ: GFR (7/16) 133ml/min   - Monitor weights and labs      # Risk for TLS:   - Uric acid next due d+28 per protocol. Consider restart allopurinol if uric acid > 2.5.      Pulmonary:  # Risk for pulmonary insufficiency: No current concerns. Normal chest and sinus CTs during work-up.      Cardiovascular:  # Aaron-Parkinson-White Syndrome: diagnosed upon syncope in 02/2018. Asymptomatic, no interventions to date. No current concerns. EKG (7/15) revealed WPW with normal sinus rhythm. Echocardiogram (7/15) showed LVEF of 62%. EKG (8/12) showed resolution of WPW.     # History of asymptomatic bradycardia: Titrated off dopamine on 8/11, subsequent day experienced asymptomatic bradycardia; confirmed sinus bradycardia on EKG.  Now resolved.     # History of hypotension related to CRS: s/p dopamine. Resolved    Heme:   # Pancytopenia secondary to chemotherapy:   - Transfuse for hemoglobin < 7, plts 10k.  - Hx of hives with both pRBCs and platelets- premedicate with tylenol and benadryl (avoiding hydrocortisone now s/p CAR-T). Per transfusion med, no need for additional intervention or special product processing.  - No GCSF per protocol     # Coagulopathy related to CRS: s/p numerous doses of cryo (8/9-11). Coagulopathy labs improving, 8/17- INR 1.23, hepzymed PTT 26, fibrinogen 140, D-Dimer 1.5  - Continue to monitor per protocol, next due d+28     Infectious Disease:  # Risk for infection given immunocompromised status with need for prophylaxis:           - Viral: HD Valtrex continue through Day +30. CMV PCR neg (8/21).   - Fungal: Avoid Voriconazole given hx of photoxic drug eruption. Micafungin was discontinued at discharge. Begin fluconazole once transaminitis resolves.   - Bacterial: Levofloxacin 500 mg daily until ANC > 1000.  - PCP: Bactrim to begin day +28 (next week) if WBC criteria met  - Pre-op COVID neg (8/21)    # Recent Febrile Neutropenia  (7/24): thought to be related to CRS. Blood Cx NGTD and COVID neg 7/24. S/p Vanco and Cefepime.      GI:   # Nausea: zofran PRN     # Acid reflux: Protonix, TUMS prn     # Transaminitis: Improving  - Continue to monitor.      Fertility:  Sperm collected pre chemotherapy for fertility preservation    Disposition: Artemio will present Sunday for electrolyte check, then Monday AM for labs, exam, and BMBx/LP    BENJA Rose-AC  [facility]  [facility] [address]  [phone]  [phone]    Patient Active Problem List   Diagnosis     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)     Aaron-Parkinson-White (WPW) syndrome     Bone marrow transplant candidate     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)     At risk for infection     S/P allogeneic bone marrow transplant (H)     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

## 2020-08-21 NOTE — PROGRESS NOTES
Infusion Nursing Note    Artemio Nino Presents to [facility] center today for: Platelet transfusion    Due to :    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)  Encounter for screening for other viral diseases  S/P allogeneic bone marrow transplant (H)  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)    Intravenous Access/Labs: Labs drawn as ordered from CVC. CVC dressing and caps changed.     Infusion Note: Pre meds of PO Tylenol and IV Benadryl given over 15 minutes. One dose of platelets transfused. Approximately 52 minutes into the one hour infusion, Artemio developed hives (one on chin and one on forehead). Transfusion was terminated. Provider Jacqueline Shin notified and assessed patient. Patient was monitored for approximately 30 minutes and symptoms did not progress. Hives did not resolve. Blood bank notified and blood component returned to blood bank.    Post Infusion Assessment: Vital signs remained stable throughout. Both lumens heparin locked. Patient was instructed to re-dose benadryl at home at 1400 if hives still present at that time and go to ED if new serious allergic reaction developed.     Discharge Plan:   Patient verbalized understanding of discharge instructions. Pt left [facility] in stable condition.

## 2020-08-21 NOTE — PHARMACY-CONSULT NOTE
Outpatient IV Medication Monitoring Note:    Isael was seen in [facility] today for labs and assessment.  1. Discontinue TPN  2. Continue Standard Line Cares    Isael is scheduled for line removal on Monday 8/24/2020. I will communicate with [facility] to confirm line is removed.     Discussed with JOHN Sullivan, PharmD

## 2020-08-21 NOTE — PROGRESS NOTES
This is a recent snapshot of the patient's [facility] medical record.  For current drug dose and complete information and questions, call [phone] or In Basket pool, fv home infusion ([id number])  CSN Number:  [id number]

## 2020-08-22 LAB
CMV DNA SPEC NAA+PROBE-ACNC: NORMAL [IU]/ML
CMV DNA SPEC NAA+PROBE-LOG#: NORMAL {LOG_IU}/ML
Lab: NORMAL
Lab: NORMAL
SPECIMEN SOURCE: NORMAL
YEAST SPEC QL CULT: NO GROWTH
YEAST SPEC QL CULT: NO GROWTH

## 2020-08-22 RX ORDER — ACETAMINOPHEN 325 MG/1
650 TABLET ORAL ONCE
Status: CANCELLED
Start: 2020-08-22

## 2020-08-22 RX ORDER — ONDANSETRON 4 MG/1
4 TABLET, ORALLY DISINTEGRATING ORAL EVERY 6 HOURS PRN
Qty: 120 TABLET | Refills: 0
Start: 2020-08-22 | End: 2020-08-28

## 2020-08-22 RX ORDER — DIPHENHYDRAMINE HYDROCHLORIDE 50 MG/ML
50 INJECTION INTRAMUSCULAR; INTRAVENOUS EVERY 6 HOURS PRN
Status: CANCELLED
Start: 2020-08-22

## 2020-08-23 DIAGNOSIS — C91.02 ACUTE LYMPHOBLASTIC LEUKEMIA (ALL) IN RELAPSE (H): ICD-10-CM

## 2020-08-23 LAB
ANION GAP SERPL CALCULATED.3IONS-SCNC: 8 MMOL/L (ref 3–14)
BUN SERPL-MCNC: 22 MG/DL (ref 7–30)
CALCIUM SERPL-MCNC: 9.1 MG/DL (ref 8.5–10.1)
CHLORIDE SERPL-SCNC: 103 MMOL/L (ref 94–109)
CO2 SERPL-SCNC: 27 MMOL/L (ref 20–32)
CREAT SERPL-MCNC: 0.72 MG/DL (ref 0.66–1.25)
GFR SERPL CREATININE-BSD FRML MDRD: >90 ML/MIN/{1.73_M2}
GLUCOSE SERPL-MCNC: 125 MG/DL (ref 70–99)
Lab: NORMAL
Lab: NORMAL
MAGNESIUM SERPL-MCNC: 2 MG/DL (ref 1.6–2.3)
PHOSPHATE SERPL-MCNC: 5.3 MG/DL (ref 2.5–4.5)
POTASSIUM SERPL-SCNC: 4.2 MMOL/L (ref 3.4–5.3)
SODIUM SERPL-SCNC: 138 MMOL/L (ref 133–144)
SPECIMEN SOURCE: NORMAL
SPECIMEN SOURCE: NORMAL
YEAST SPEC QL CULT: NO GROWTH
YEAST SPEC QL CULT: NO GROWTH

## 2020-08-23 PROCEDURE — 84100 ASSAY OF PHOSPHORUS: CPT | Performed by: NURSE PRACTITIONER

## 2020-08-23 PROCEDURE — 36592 COLLECT BLOOD FROM PICC: CPT | Performed by: NURSE PRACTITIONER

## 2020-08-23 PROCEDURE — 80048 BASIC METABOLIC PNL TOTAL CA: CPT | Performed by: NURSE PRACTITIONER

## 2020-08-23 PROCEDURE — 83735 ASSAY OF MAGNESIUM: CPT | Performed by: NURSE PRACTITIONER

## 2020-08-24 ENCOUNTER — ANESTHESIA EVENT (OUTPATIENT)
Dept: PEDIATRICS | Facility: CLINIC | Age: 20
End: 2020-08-24
Payer: COMMERCIAL

## 2020-08-24 ENCOUNTER — ONCOLOGY VISIT (OUTPATIENT)
Dept: TRANSPLANT | Facility: CLINIC | Age: 20
End: 2020-08-24
Attending: NURSE PRACTITIONER
Payer: COMMERCIAL

## 2020-08-24 ENCOUNTER — HOME INFUSION (PRE-WILLOW HOME INFUSION) (OUTPATIENT)
Dept: PHARMACY | Facility: CLINIC | Age: 20
End: 2020-08-24

## 2020-08-24 ENCOUNTER — HOSPITAL ENCOUNTER (OUTPATIENT)
Facility: CLINIC | Age: 20
Discharge: HOME | End: 2020-08-24
Attending: RADIOLOGY | Admitting: NURSE PRACTITIONER
Payer: COMMERCIAL

## 2020-08-24 ENCOUNTER — ANESTHESIA (OUTPATIENT)
Dept: PEDIATRICS | Facility: CLINIC | Age: 20
End: 2020-08-24
Payer: COMMERCIAL

## 2020-08-24 ENCOUNTER — HOSPITAL ENCOUNTER (OUTPATIENT)
Dept: INTERVENTIONAL RADIOLOGY/VASCULAR | Facility: CLINIC | Age: 20
End: 2020-08-24
Attending: PEDIATRICS | Admitting: RADIOLOGY
Payer: COMMERCIAL

## 2020-08-24 VITALS
DIASTOLIC BLOOD PRESSURE: 50 MMHG | OXYGEN SATURATION: 99 % | RESPIRATION RATE: 18 BRPM | WEIGHT: 130.51 LBS | SYSTOLIC BLOOD PRESSURE: 99 MMHG | HEART RATE: 85 BPM | BODY MASS INDEX: 19.49 KG/M2 | TEMPERATURE: 97.4 F

## 2020-08-24 DIAGNOSIS — Z94.81 S/P ALLOGENEIC BONE MARROW TRANSPLANT (H): ICD-10-CM

## 2020-08-24 DIAGNOSIS — C91.00 ALL (ACUTE LYMPHOBLASTIC LEUKEMIA OF INFANT) (H): ICD-10-CM

## 2020-08-24 DIAGNOSIS — C91.02 ACUTE LYMPHOBLASTIC LEUKEMIA (ALL) IN RELAPSE (H): Primary | ICD-10-CM

## 2020-08-24 DIAGNOSIS — C91.02 ACUTE LYMPHOBLASTIC LEUKEMIA (ALL) IN RELAPSE (H): ICD-10-CM

## 2020-08-24 DIAGNOSIS — C91.01 ACUTE LYMPHOBLASTIC LEUKEMIA (ALL) IN REMISSION (H): Primary | ICD-10-CM

## 2020-08-24 DIAGNOSIS — C91.01 ACUTE LYMPHOBLASTIC LEUKEMIA (ALL) IN REMISSION (H): ICD-10-CM

## 2020-08-24 LAB
ALBUMIN SERPL-MCNC: 3.6 G/DL (ref 3.4–5)
ALP SERPL-CCNC: 73 U/L (ref 40–150)
ALT SERPL W P-5'-P-CCNC: 124 U/L (ref 0–70)
ANION GAP SERPL CALCULATED.3IONS-SCNC: 6 MMOL/L (ref 3–14)
ANISOCYTOSIS BLD QL SMEAR: SLIGHT
APTT BLD: 28 SEC (ref 22–37)
APTT PPP: 58 SEC (ref 22–37)
AST SERPL W P-5'-P-CCNC: 32 U/L (ref 0–45)
BASOPHILS # BLD AUTO: 0 10E9/L (ref 0–0.2)
BASOPHILS NFR BLD AUTO: 0 %
BILIRUB SERPL-MCNC: 2 MG/DL (ref 0.2–1.3)
BLD PROD TYP BPU: NORMAL
BLD PROD TYP BPU: NORMAL
BLD UNIT ID BPU: 0
BLOOD PRODUCT CODE: NORMAL
BPU ID: NORMAL
BUN SERPL-MCNC: 18 MG/DL (ref 7–30)
CALCIUM SERPL-MCNC: 8.8 MG/DL (ref 8.5–10.1)
CD19 CELLS # BLD: <1 CELLS/UL (ref 107–698)
CD19 CELLS NFR BLD: <1 % (ref 6–27)
CD3 CELLS # BLD: 124 CELLS/UL (ref 603–2990)
CD3 CELLS NFR BLD: 90 % (ref 49–84)
CD3+CD4+ CELLS # BLD: 39 CELLS/UL (ref 441–2156)
CD3+CD4+ CELLS NFR BLD: 28 % (ref 28–63)
CD3+CD4+ CELLS/CD3+CD8+ CLL BLD: 0.47 % (ref 1.4–2.6)
CD3+CD8+ CELLS # BLD: 81 CELLS/UL (ref 125–1312)
CD3+CD8+ CELLS NFR BLD: 59 % (ref 10–40)
CD3-CD16+CD56+ CELLS # BLD: 12 CELLS/UL (ref 95–640)
CD3-CD16+CD56+ CELLS NFR BLD: 9 % (ref 4–25)
CHLORIDE SERPL-SCNC: 106 MMOL/L (ref 94–109)
CO2 SERPL-SCNC: 28 MMOL/L (ref 20–32)
CREAT SERPL-MCNC: 0.7 MG/DL (ref 0.66–1.25)
CRP SERPL-MCNC: <2.9 MG/L (ref 0–8)
D DIMER PPP FEU-MCNC: 0.4 UG/ML FEU (ref 0–0.5)
DIFFERENTIAL METHOD BLD: ABNORMAL
EOSINOPHIL # BLD AUTO: 0 10E9/L (ref 0–0.7)
EOSINOPHIL NFR BLD AUTO: 0 %
ERYTHROCYTE [DISTWIDTH] IN BLOOD BY AUTOMATED COUNT: 16.8 % (ref 10–15)
FERRITIN SERPL-MCNC: 1556 NG/ML (ref 26–388)
FIBRINOGEN PPP-MCNC: 187 MG/DL (ref 200–420)
GFR SERPL CREATININE-BSD FRML MDRD: >90 ML/MIN/{1.73_M2}
GLUCOSE CSF-MCNC: 48 MG/DL (ref 40–70)
GLUCOSE SERPL-MCNC: 96 MG/DL (ref 70–99)
HCT VFR BLD AUTO: 24.1 % (ref 40–53)
HGB BLD-MCNC: 8 G/DL (ref 13.3–17.7)
IFC SPECIMEN: ABNORMAL
INR PPP: 1.13 (ref 0.86–1.14)
LDH SERPL L TO P-CCNC: 177 U/L (ref 85–227)
LYMPHOCYTES # BLD AUTO: 0.2 10E9/L (ref 0.8–5.3)
LYMPHOCYTES NFR BLD AUTO: 44.3 %
MACROCYTES BLD QL SMEAR: PRESENT
MAGNESIUM SERPL-MCNC: 2 MG/DL (ref 1.6–2.3)
MCH RBC QN AUTO: 29.4 PG (ref 26.5–33)
MCHC RBC AUTO-ENTMCNC: 33.2 G/DL (ref 31.5–36.5)
MCV RBC AUTO: 89 FL (ref 78–100)
MISCELLANEOUS TEST: NORMAL
MONOCYTES # BLD AUTO: 0.1 10E9/L (ref 0–1.3)
MONOCYTES NFR BLD AUTO: 18 %
NEUTROPHILS # BLD AUTO: 0.2 10E9/L (ref 1.6–8.3)
NEUTROPHILS NFR BLD AUTO: 37.7 %
NUM BPU REQUESTED: 1
OVALOCYTES BLD QL SMEAR: SLIGHT
PHOSPHATE SERPL-MCNC: 5 MG/DL (ref 2.5–4.5)
PLATELET # BLD AUTO: 29 10E9/L (ref 150–450)
PLATELET # BLD AUTO: 53 10E9/L (ref 150–450)
PLATELET # BLD EST: ABNORMAL 10*3/UL
POIKILOCYTOSIS BLD QL SMEAR: SLIGHT
POTASSIUM SERPL-SCNC: 3.8 MMOL/L (ref 3.4–5.3)
PROT CSF-MCNC: 47 MG/DL (ref 15–60)
PROT SERPL-MCNC: 6.4 G/DL (ref 6.8–8.8)
RBC # BLD AUTO: 2.72 10E12/L (ref 4.4–5.9)
RBC INCLUSIONS BLD: SLIGHT
SODIUM SERPL-SCNC: 140 MMOL/L (ref 133–144)
TRANSFUSION STATUS PATIENT QL: NORMAL
TRANSFUSION STATUS PATIENT QL: NORMAL
URATE SERPL-MCNC: 3.8 MG/DL (ref 3.5–7.2)
WBC # BLD AUTO: 0.5 10E9/L (ref 4–11)

## 2020-08-24 PROCEDURE — 85730 THROMBOPLASTIN TIME PARTIAL: CPT | Performed by: PEDIATRICS

## 2020-08-24 PROCEDURE — 88280 CHROMOSOME KARYOTYPE STUDY: CPT | Performed by: PHYSICIAN ASSISTANT

## 2020-08-24 PROCEDURE — 25000125 ZZHC RX 250: Performed by: ANESTHESIOLOGY

## 2020-08-24 PROCEDURE — 86357 NK CELLS TOTAL COUNT: CPT | Performed by: NURSE PRACTITIONER

## 2020-08-24 PROCEDURE — 89050 BODY FLUID CELL COUNT: CPT | Performed by: PHYSICIAN ASSISTANT

## 2020-08-24 PROCEDURE — 40001004 ZZHCL STATISTIC FLOW INT 9-15 ABY TC 88188: Performed by: PHYSICIAN ASSISTANT

## 2020-08-24 PROCEDURE — 27210134 ZZH KIT BIOPSY BONE MARROW: Performed by: NURSE PRACTITIONER

## 2020-08-24 PROCEDURE — 62270 DX LMBR SPI PNXR: CPT | Performed by: NURSE PRACTITIONER

## 2020-08-24 PROCEDURE — 85379 FIBRIN DEGRADATION QUANT: CPT | Performed by: PEDIATRICS

## 2020-08-24 PROCEDURE — 86360 T CELL ABSOLUTE COUNT/RATIO: CPT | Performed by: NURSE PRACTITIONER

## 2020-08-24 PROCEDURE — 84550 ASSAY OF BLOOD/URIC ACID: CPT | Performed by: PEDIATRICS

## 2020-08-24 PROCEDURE — 88184 FLOWCYTOMETRY/ TC 1 MARKER: CPT | Mod: 91 | Performed by: PHYSICIAN ASSISTANT

## 2020-08-24 PROCEDURE — 96375 TX/PRO/DX INJ NEW DRUG ADDON: CPT | Performed by: NURSE PRACTITIONER

## 2020-08-24 PROCEDURE — 88264 CHROMOSOME ANALYSIS 20-25: CPT | Performed by: PHYSICIAN ASSISTANT

## 2020-08-24 PROCEDURE — 25000128 H RX IP 250 OP 636: Performed by: NURSE PRACTITIONER

## 2020-08-24 PROCEDURE — 37000008 ZZH ANESTHESIA TECHNICAL FEE, 1ST 30 MIN: Performed by: NURSE PRACTITIONER

## 2020-08-24 PROCEDURE — 85025 COMPLETE CBC W/AUTO DIFF WBC: CPT | Performed by: PEDIATRICS

## 2020-08-24 PROCEDURE — 88108 CYTOPATH CONCENTRATE TECH: CPT | Performed by: PHYSICIAN ASSISTANT

## 2020-08-24 PROCEDURE — 25000128 H RX IP 250 OP 636

## 2020-08-24 PROCEDURE — 36430 TRANSFUSION BLD/BLD COMPNT: CPT | Performed by: NURSE PRACTITIONER

## 2020-08-24 PROCEDURE — 25000132 ZZH RX MED GY IP 250 OP 250 PS 637

## 2020-08-24 PROCEDURE — 88185 FLOWCYTOMETRY/TC ADD-ON: CPT | Performed by: PHYSICIAN ASSISTANT

## 2020-08-24 PROCEDURE — 86355 B CELLS TOTAL COUNT: CPT | Performed by: NURSE PRACTITIONER

## 2020-08-24 PROCEDURE — 86140 C-REACTIVE PROTEIN: CPT | Performed by: PEDIATRICS

## 2020-08-24 PROCEDURE — 82945 GLUCOSE OTHER FLUID: CPT | Performed by: PHYSICIAN ASSISTANT

## 2020-08-24 PROCEDURE — 37000009 ZZH ANESTHESIA TECHNICAL FEE, EACH ADDTL 15 MIN: Performed by: NURSE PRACTITIONER

## 2020-08-24 PROCEDURE — 84157 ASSAY OF PROTEIN OTHER: CPT | Performed by: PHYSICIAN ASSISTANT

## 2020-08-24 PROCEDURE — 36591 DRAW BLOOD OFF VENOUS DEVICE: CPT | Performed by: NURSE PRACTITIONER

## 2020-08-24 PROCEDURE — 88271 CYTOGENETICS DNA PROBE: CPT | Performed by: PHYSICIAN ASSISTANT

## 2020-08-24 PROCEDURE — 38222 DX BONE MARROW BX & ASPIR: CPT | Performed by: NURSE PRACTITIONER

## 2020-08-24 PROCEDURE — 82728 ASSAY OF FERRITIN: CPT | Performed by: PEDIATRICS

## 2020-08-24 PROCEDURE — 85049 AUTOMATED PLATELET COUNT: CPT | Performed by: NURSE PRACTITIONER

## 2020-08-24 PROCEDURE — 25000128 H RX IP 250 OP 636: Performed by: NURSE ANESTHETIST, CERTIFIED REGISTERED

## 2020-08-24 PROCEDURE — 40000165 ZZH STATISTIC POST-PROCEDURE RECOVERY CARE: Performed by: NURSE PRACTITIONER

## 2020-08-24 PROCEDURE — 85610 PROTHROMBIN TIME: CPT | Performed by: PEDIATRICS

## 2020-08-24 PROCEDURE — 88275 CYTOGENETICS 100-300: CPT | Performed by: PHYSICIAN ASSISTANT

## 2020-08-24 PROCEDURE — 36589 REMOVAL TUNNELED CV CATH: CPT | Performed by: NURSE PRACTITIONER

## 2020-08-24 PROCEDURE — 25800030 ZZH RX IP 258 OP 636: Performed by: NURSE ANESTHETIST, CERTIFIED REGISTERED

## 2020-08-24 PROCEDURE — 00000102 ZZHCL STATISTIC CYTO WRIGHT STAIN TC: Performed by: PHYSICIAN ASSISTANT

## 2020-08-24 PROCEDURE — 84100 ASSAY OF PHOSPHORUS: CPT | Performed by: PEDIATRICS

## 2020-08-24 PROCEDURE — 25000125 ZZHC RX 250: Performed by: NURSE ANESTHETIST, CERTIFIED REGISTERED

## 2020-08-24 PROCEDURE — 83735 ASSAY OF MAGNESIUM: CPT | Performed by: PEDIATRICS

## 2020-08-24 PROCEDURE — P9037 PLATE PHERES LEUKOREDU IRRAD: HCPCS | Performed by: NURSE PRACTITIONER

## 2020-08-24 PROCEDURE — 88108 CYTOPATH CONCENTRATE TECH: CPT | Mod: 26 | Performed by: PHYSICIAN ASSISTANT

## 2020-08-24 PROCEDURE — 96374 THER/PROPH/DIAG INJ IV PUSH: CPT | Performed by: NURSE PRACTITIONER

## 2020-08-24 PROCEDURE — 88237 TISSUE CULTURE BONE MARROW: CPT | Performed by: PHYSICIAN ASSISTANT

## 2020-08-24 PROCEDURE — 85384 FIBRINOGEN ACTIVITY: CPT | Performed by: PEDIATRICS

## 2020-08-24 PROCEDURE — 83615 LACTATE (LD) (LDH) ENZYME: CPT | Performed by: PEDIATRICS

## 2020-08-24 PROCEDURE — 25000125 ZZHC RX 250: Performed by: NURSE PRACTITIONER

## 2020-08-24 PROCEDURE — 80053 COMPREHEN METABOLIC PANEL: CPT | Performed by: PEDIATRICS

## 2020-08-24 PROCEDURE — 40001005 ZZHCL STATISTIC FLOW >15 ABY TC 88189: Performed by: PHYSICIAN ASSISTANT

## 2020-08-24 PROCEDURE — 86359 T CELLS TOTAL COUNT: CPT | Performed by: NURSE PRACTITIONER

## 2020-08-24 PROCEDURE — 40001011 ZZH STATISTIC PRE-PROCEDURE NURSING ASSESSMENT: Performed by: NURSE PRACTITIONER

## 2020-08-24 RX ORDER — METHYLPREDNISOLONE SODIUM SUCCINATE 125 MG/2ML
INJECTION, POWDER, LYOPHILIZED, FOR SOLUTION INTRAMUSCULAR; INTRAVENOUS
Status: COMPLETED
Start: 2020-08-24 | End: 2020-08-24

## 2020-08-24 RX ORDER — DIPHENHYDRAMINE HYDROCHLORIDE 50 MG/ML
INJECTION INTRAMUSCULAR; INTRAVENOUS
Status: COMPLETED
Start: 2020-08-24 | End: 2020-08-24

## 2020-08-24 RX ORDER — METHYLPREDNISOLONE SODIUM SUCCINATE 40 MG/ML
60 INJECTION, POWDER, LYOPHILIZED, FOR SOLUTION INTRAMUSCULAR; INTRAVENOUS ONCE
Status: COMPLETED | OUTPATIENT
Start: 2020-08-24 | End: 2020-08-24

## 2020-08-24 RX ORDER — ACETAMINOPHEN 325 MG/1
650 TABLET ORAL ONCE
Status: COMPLETED | OUTPATIENT
Start: 2020-08-24 | End: 2020-08-24

## 2020-08-24 RX ORDER — PROPOFOL 10 MG/ML
INJECTION, EMULSION INTRAVENOUS PRN
Status: DISCONTINUED | OUTPATIENT
Start: 2020-08-24 | End: 2020-08-24

## 2020-08-24 RX ORDER — LIDOCAINE HYDROCHLORIDE 10 MG/ML
INJECTION, SOLUTION EPIDURAL; INFILTRATION; INTRACAUDAL; PERINEURAL
Status: DISCONTINUED
Start: 2020-08-24 | End: 2020-08-24 | Stop reason: HOSPADM

## 2020-08-24 RX ORDER — LIDOCAINE 40 MG/G
CREAM TOPICAL
Status: DISCONTINUED | OUTPATIENT
Start: 2020-08-24 | End: 2020-08-24 | Stop reason: HOSPADM

## 2020-08-24 RX ORDER — PROPOFOL 10 MG/ML
INJECTION, EMULSION INTRAVENOUS CONTINUOUS PRN
Status: DISCONTINUED | OUTPATIENT
Start: 2020-08-24 | End: 2020-08-24

## 2020-08-24 RX ORDER — FENTANYL CITRATE 50 UG/ML
INJECTION, SOLUTION INTRAMUSCULAR; INTRAVENOUS PRN
Status: DISCONTINUED | OUTPATIENT
Start: 2020-08-24 | End: 2020-08-24

## 2020-08-24 RX ORDER — HEPARIN SODIUM (PORCINE) LOCK FLUSH IV SOLN 100 UNIT/ML 100 UNIT/ML
SOLUTION INTRAVENOUS
Status: COMPLETED
Start: 2020-08-24 | End: 2020-08-24

## 2020-08-24 RX ORDER — DIPHENHYDRAMINE HYDROCHLORIDE 50 MG/ML
50 INJECTION INTRAMUSCULAR; INTRAVENOUS EVERY 6 HOURS PRN
Status: DISCONTINUED | OUTPATIENT
Start: 2020-08-24 | End: 2020-08-24 | Stop reason: HOSPADM

## 2020-08-24 RX ORDER — ONDANSETRON 2 MG/ML
INJECTION INTRAMUSCULAR; INTRAVENOUS PRN
Status: DISCONTINUED | OUTPATIENT
Start: 2020-08-24 | End: 2020-08-24

## 2020-08-24 RX ORDER — DIPHENHYDRAMINE HYDROCHLORIDE 50 MG/ML
50 INJECTION INTRAMUSCULAR; INTRAVENOUS EVERY 6 HOURS PRN
Status: CANCELLED
Start: 2020-08-24

## 2020-08-24 RX ORDER — ACETAMINOPHEN 325 MG/1
TABLET ORAL
Status: COMPLETED
Start: 2020-08-24 | End: 2020-08-24

## 2020-08-24 RX ORDER — ACETAMINOPHEN 325 MG/1
650 TABLET ORAL ONCE
Status: CANCELLED
Start: 2020-08-24

## 2020-08-24 RX ORDER — SODIUM CHLORIDE 9 MG/ML
INJECTION, SOLUTION INTRAVENOUS
Status: DISCONTINUED
Start: 2020-08-24 | End: 2020-08-24 | Stop reason: HOSPADM

## 2020-08-24 RX ORDER — SODIUM CHLORIDE, SODIUM LACTATE, POTASSIUM CHLORIDE, CALCIUM CHLORIDE 600; 310; 30; 20 MG/100ML; MG/100ML; MG/100ML; MG/100ML
INJECTION, SOLUTION INTRAVENOUS CONTINUOUS PRN
Status: DISCONTINUED | OUTPATIENT
Start: 2020-08-24 | End: 2020-08-24

## 2020-08-24 RX ORDER — EPHEDRINE SULFATE 50 MG/ML
INJECTION, SOLUTION INTRAMUSCULAR; INTRAVENOUS; SUBCUTANEOUS PRN
Status: DISCONTINUED | OUTPATIENT
Start: 2020-08-24 | End: 2020-08-24

## 2020-08-24 RX ADMIN — FENTANYL CITRATE 25 MCG: 50 INJECTION, SOLUTION INTRAMUSCULAR; INTRAVENOUS at 10:58

## 2020-08-24 RX ADMIN — DIPHENHYDRAMINE HYDROCHLORIDE 50 MG: 50 INJECTION INTRAMUSCULAR; INTRAVENOUS at 09:31

## 2020-08-24 RX ADMIN — ACETAMINOPHEN 650 MG: 325 TABLET, FILM COATED ORAL at 09:30

## 2020-08-24 RX ADMIN — PROPOFOL 20 MG: 10 INJECTION, EMULSION INTRAVENOUS at 10:56

## 2020-08-24 RX ADMIN — Medication 5 MG: at 11:12

## 2020-08-24 RX ADMIN — PROPOFOL 50 MG: 10 INJECTION, EMULSION INTRAVENOUS at 10:43

## 2020-08-24 RX ADMIN — PROPOFOL 50 MG: 10 INJECTION, EMULSION INTRAVENOUS at 10:42

## 2020-08-24 RX ADMIN — PROPOFOL 300 MCG/KG/MIN: 10 INJECTION, EMULSION INTRAVENOUS at 10:42

## 2020-08-24 RX ADMIN — DIPHENHYDRAMINE HYDROCHLORIDE 50 MG: 50 INJECTION, SOLUTION INTRAMUSCULAR; INTRAVENOUS at 09:31

## 2020-08-24 RX ADMIN — PROPOFOL 30 MG: 10 INJECTION, EMULSION INTRAVENOUS at 10:53

## 2020-08-24 RX ADMIN — METHYLPREDNISOLONE SODIUM SUCCINATE 60 MG: 125 INJECTION, POWDER, FOR SOLUTION INTRAMUSCULAR; INTRAVENOUS at 09:32

## 2020-08-24 RX ADMIN — PROPOFOL 50 MG: 10 INJECTION, EMULSION INTRAVENOUS at 10:46

## 2020-08-24 RX ADMIN — ACETAMINOPHEN 650 MG: 325 TABLET ORAL at 09:30

## 2020-08-24 RX ADMIN — SODIUM CHLORIDE, POTASSIUM CHLORIDE, SODIUM LACTATE AND CALCIUM CHLORIDE: 600; 310; 30; 20 INJECTION, SOLUTION INTRAVENOUS at 12:02

## 2020-08-24 RX ADMIN — PROPOFOL 20 MG: 10 INJECTION, EMULSION INTRAVENOUS at 10:54

## 2020-08-24 RX ADMIN — ONDANSETRON 4 MG: 2 INJECTION INTRAMUSCULAR; INTRAVENOUS at 12:10

## 2020-08-24 RX ADMIN — SODIUM CHLORIDE, POTASSIUM CHLORIDE, SODIUM LACTATE AND CALCIUM CHLORIDE: 600; 310; 30; 20 INJECTION, SOLUTION INTRAVENOUS at 10:39

## 2020-08-24 RX ADMIN — PHENYLEPHRINE HYDROCHLORIDE 50 MCG: 10 INJECTION INTRAVENOUS at 11:18

## 2020-08-24 RX ADMIN — METHYLPREDNISOLONE 60 MG: 40 INJECTION, POWDER, LYOPHILIZED, FOR SOLUTION INTRAMUSCULAR; INTRAVENOUS at 09:32

## 2020-08-24 NOTE — ANESTHESIA POSTPROCEDURE EVALUATION
Anesthesia POST Procedure Evaluation    Patient: Artemio Nino   MRN:     [id number] Gender:   male   Age:    20 year old :      2000        Preoperative Diagnosis: ALL (acute lymphoblastic leukemia) (H) [C91.00]   Procedure(s):  Bone marrow biopsy  LUMBAR PUNCTURE, DIAGNOSTIC  tunneled line removal   Postop Comments: No value filed.     Anesthesia Type: MAC       Disposition: Outpatient   Postop Pain Control: Uneventful            Sign Out: Well controlled pain   PONV: No   Neuro/Psych: Uneventful            Sign Out: Acceptable/Baseline neuro status   Airway/Respiratory: Uneventful            Sign Out: Acceptable/Baseline resp. status   CV/Hemodynamics: Uneventful            Sign Out: Acceptable CV status   Other NRE: NONE   DID A NON-ROUTINE EVENT OCCUR? No         Last Anesthesia Record Vitals:  CRNA VITALS  2020 1138 - 2020 1238      2020             Resp Rate (observed):  (!) 4          Last PACU Vitals:  Vitals Value Taken Time   BP 83/43 2020 12:15 PM   Temp 36.2  C (97.2  F) 2020 12:10 PM   Pulse     Resp 16 2020 12:15 PM   SpO2 100 % 2020 12:15 PM   Temp src     NIBP     Pulse     SpO2     Resp     Temp     Ht Rate     Temp 2           Electronically Signed By: Marian Redman MD, 2020, 1:31 PM

## 2020-08-24 NOTE — PROGRESS NOTES
Pediatric BMT Daily Progress Note  Date of Service: 8/24/20    Interval History: Artemio is seen in Pediatric Sedation today for labs, exam, and bone marrow biopsy with lumbar puncture. He continues to feel well with minimal complaints. No fevers, fatigue, nausea, rashes, or active bleeding. Continues to eat and drink well with tolerance of medications.     Review of Systems: Pertinent positives include those mentioned in interval events. A complete review of systems was performed and is otherwise negative.      Medications:  Please see MAR    Physical Exam:    Vital Signs for Peds 8/24/2020   SYSTOLIC 111   DIASTOLIC 75   PULSE 83   TEMPERATURE 97.9   RESPIRATIONS 20   WEIGHT (kg) 59.2 kg   HEIGHT (cm)    BMI    pain    O2 99     GEN: Sitting on bed, pleasant and cooperative. NAD. Mother present.   HEENT: Full head of hair, NC/AT, sclerae anicteric, nares patent without rhinorrhea, OP clear without lesions, MMM.   CARD: RRR, normal S1/S2 without murmur. Cap refill < 2 seconds.  RESP: CTAB, good air entry throughout, no adventitious lung sounds.   ABD: Soft, nontender, nondistended. No organomegaly appreciated.   EXTREM: WWP, no edema.   SKIN: Pale, no bruises, rashes, or lesions present.  NEURO: cranial nerves intact, grossly normal  ACCESS: DL Galvan to left chest C/D/I, right port unaccessed    Labs:  Results for orders placed or performed during the hospital encounter of 08/24/20   Platelets prepare order unit     Status: None   Result Value Ref Range    Blood Component Type PLT Pheresis     Units Ordered 1    Blood component     Status: None   Result Value Ref Range    Unit Number [id number]     Blood Component Type PlateletPheresis LeukoReduced Irradiated     Division Number 00     Status of Unit Released to care unit 08/24/2020 0922     Blood Product Code [id number]     Unit Status ISS    Results for orders placed or performed in visit on 08/24/20   CBC with platelets differential     Status: Abnormal    Result Value Ref Range    WBC 0.5 (LL) 4.0 - 11.0 10e9/L    RBC Count 2.72 (L) 4.4 - 5.9 10e12/L    Hemoglobin 8.0 (L) 13.3 - 17.7 g/dL    Hematocrit 24.1 (L) 40.0 - 53.0 %    MCV 89 78 - 100 fl    MCH 29.4 26.5 - 33.0 pg    MCHC 33.2 31.5 - 36.5 g/dL    RDW 16.8 (H) 10.0 - 15.0 %    Platelet Count 29 (LL) 150 - 450 10e9/L    Diff Method Manual Differential     % Neutrophils 37.7 %    % Lymphocytes 44.3 %    % Monocytes 18.0 %    % Eosinophils 0.0 %    % Basophils 0.0 %    Absolute Neutrophil 0.2 (LL) 1.6 - 8.3 10e9/L    Absolute Lymphocytes 0.2 (L) 0.8 - 5.3 10e9/L    Absolute Monocytes 0.1 0.0 - 1.3 10e9/L    Absolute Eosinophils 0.0 0.0 - 0.7 10e9/L    Absolute Basophils 0.0 0.0 - 0.2 10e9/L    Anisocytosis Slight     Poikilocytosis Slight     RBC Fragments Slight     Ovalocytes Slight     Macrocytes Present     Platelet Estimate Confirming automated cell count    Comprehensive metabolic panel     Status: Abnormal   Result Value Ref Range    Sodium 140 133 - 144 mmol/L    Potassium 3.8 3.4 - 5.3 mmol/L    Chloride 106 94 - 109 mmol/L    Carbon Dioxide 28 20 - 32 mmol/L    Anion Gap 6 3 - 14 mmol/L    Glucose 96 70 - 99 mg/dL    Urea Nitrogen 18 7 - 30 mg/dL    Creatinine 0.70 0.66 - 1.25 mg/dL    GFR Estimate >90 >60 mL/min/[1.73_m2]    GFR Estimate If Black >90 >60 mL/min/[1.73_m2]    Calcium 8.8 8.5 - 10.1 mg/dL    Bilirubin Total 2.0 (H) 0.2 - 1.3 mg/dL    Albumin 3.6 3.4 - 5.0 g/dL    Protein Total 6.4 (L) 6.8 - 8.8 g/dL    Alkaline Phosphatase 73 40 - 150 U/L     (H) 0 - 70 U/L    AST 32 0 - 45 U/L   Magnesium     Status: None   Result Value Ref Range    Magnesium 2.0 1.6 - 2.3 mg/dL   Phosphorus     Status: Abnormal   Result Value Ref Range    Phosphorus 5.0 (H) 2.5 - 4.5 mg/dL   Lactate Dehydrogenase     Status: None   Result Value Ref Range    Lactate Dehydrogenase 177 85 - 227 U/L   Uric acid     Status: None   Result Value Ref Range    Uric Acid 3.8 3.5 - 7.2 mg/dL   Ferritin     Status:  Abnormal   Result Value Ref Range    Ferritin 1,556 (H) 26 - 388 ng/mL   CRP inflammation     Status: None   Result Value Ref Range    CRP Inflammation <2.9 0.0 - 8.0 mg/L   INR     Status: None   Result Value Ref Range    INR 1.13 0.86 - 1.14   Partial thromboplastin time     Status: Abnormal   Result Value Ref Range    PTT 58 (H) 22 - 37 sec   D dimer quantitative     Status: None   Result Value Ref Range    D Dimer 0.4 0.0 - 0.50 ug/ml FEU     Hepzymed PTT in process    Assessment/Plan:  Artemio is a 20 year old male with 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 He was discharged on 8/14 following his admission for febrile neutropenia and Kymriah infusion. His course was complicated by grade 3 CRS with hypotension and CAR-T neurotoxicity requiring 3 doses of tociluzimab, fluid boluses, dopamine, and steroids. He continues with clinical wellbeing in the outpatient setting- due for disease evaluations via BMBx/LP today.     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 Completed apheresis collection in the [facility] (6/2020). Bridging chemotherapy with oral 6MP, methotrexate and ruxolitinib. All oral chemotherapy stopped on 7/12. BM revealed 75% involvement with leukemia. CSF was negative. Outpatient lymphodepleting chemotherapy per [id number] with cytoxan and fludarabine 7/21-7/24 completed as an outpatient. Kymriah infusion inpatient 7/27. Awaiting count recovery.     # History of CRS: Date of onset: 7/28/20, initial grade 2 with max grade 3 (8/6). S/p IVF, dopamine, tociluzimab x 3, and Methylprednisolone. Resolved.     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 S/p narctoics and methylprednisolone with symptom resolution. Steroid wean completed, off since 8/18.     FEN/Renal:   #  Risk for malnutrition: Intake improving significantly since discharge. Off TPN since 8/21.     # Risk for electrolyte abnormalities:  - Hypophosphatemia: 5.0 today-- will hold neutraphos packet and continue to monitor.      # Risk for renal dysfunction/risk for ELIZ: GFR (7/16) 133ml/min   - Monitor weights and labs      # Risk for TLS: no indications, uric acid normal today     Pulmonary:  # Risk for pulmonary insufficiency: No current concerns. Normal chest and sinus CTs during work-up.      Cardiovascular:  # Aaron-Parkinson-White Syndrome: diagnosed upon syncope in 02/2018. Asymptomatic, no interventions to date. No current concerns. EKG (7/15) revealed WPW with normal sinus rhythm. Echocardiogram (7/15) showed LVEF of 62%. EKG (8/12) showed resolution of WPW.     # History of asymptomatic bradycardia: Titrated off dopamine on 8/11, subsequent day experienced asymptomatic bradycardia; confirmed sinus bradycardia on EKG.  Now resolved.     # History of hypotension related to CRS: s/p dopamine. Resolved    Heme:   # Pancytopenia secondary to chemotherapy:   - Transfuse for hemoglobin < 7, plts 10k.  - Hx of hives with both pRBCs and platelets- premedicate with tylenol and benadryl. Per transfusion med, no need for additional intervention or special product processing. Consider methylprednisolone addition as premed-- well tolerated prior to procedures today.   - No GCSF per protocol     # Coagulopathy related to CRS: s/p numerous doses of cryo (8/9-11). Coagulopathy labs improving, 8/17- INR 1.23, hepzymed PTT 26, fibrinogen 140, D-Dimer 1.5  - Continue to monitor per protocol, next due d+28     Infectious Disease:  # Risk for infection given immunocompromised status with need for prophylaxis:           - Viral: HD Valtrex continue through Day +30. CMV PCR neg (8/21).   - Fungal: Avoid Voriconazole given hx of photoxic drug eruption. Micafungin was discontinued at discharge. Begin fluconazole once transaminitis  resolves. Much improving-- reevaluate Wednesday.   - Bacterial: Levofloxacin 500 mg daily until ANC > 1000.  - PCP: Will not start Bactrim today given neutropenia. Plan to give IV Pentamidine on Wednesday.  - Pre-op COVID neg (8/21)      GI:   # Nausea: zofran PRN     # Acid reflux: Protonix, TUMS prn     # Transaminitis: Improving  - Continue to monitor.      Fertility:  Sperm collected pre chemotherapy for fertility preservation    Disposition: Artemio will return Wednesday for labs, exam, and IV Pentamidine.     BENJA Rose-[facility] Marrow Trans[facility]  [facility] [address]  [phone]  [phone]    Patient Active Problem List   Diagnosis     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)     Aaron-Parkinson-White (WPW) syndrome     Bone marrow transplant candidate     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)     At risk for infection     S/P allogeneic bone marrow transplant (H)     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

## 2020-08-24 NOTE — PROCEDURES
[facility], [place]    Procedure: IR Procedure Note    Date/Time: 8/24/2020 12:11 PM  Performed by: Siri Hernandez PA-C  Authorized by: Siri Hernandez PA-C     UNIVERSAL PROTOCOL   Site Marked: NA  Prior Images Obtained and Reviewed:  Yes  Required items: Required blood products, implants, devices and special equipment available    Patient identity confirmed:  Verbally with patient, arm band, provided demographic data and hospital-assigned identification number  Patient was reevaluated immediately before administering moderate or deep sedation or anesthesia  Confirmation Checklist:  Patient's identity using two indicators, relevant allergies, procedure was appropriate and matched the consent or emergent situation and correct equipment/implants were available  Time out: Immediately prior to the procedure a time out was called    Universal Protocol: the Joint Commission Universal Protocol was followed    Preparation: Patient was prepped and draped in usual sterile fashion           ANESTHESIA    Anesthesia: Local infiltration  Local Anesthetic:  Lidocaine 1% without epinephrine      SEDATION    Patient Sedated: Yes    Vital signs: Vital signs monitored during sedation    See dictated procedure note for full details.  Findings: Per anesthesia    Specimens: none    Complications: None    Condition: Stable    PROCEDURE   Patient Tolerance:  Patient tolerated the procedure well with no immediate complications  Describe Procedure: Completed removal of left TCVC in its entirety. Steri strips and dressing applied.   Length of time physician/provider present for 1:1 monitoring during sedation: 0

## 2020-08-24 NOTE — ANESTHESIA PREPROCEDURE EVALUATION
Anesthesia Pre-Procedure Evaluation    Patient: Artemio Nino   MRN:     [id number] Gender:   male   Age:    19 year old :      2000        Preoperative Diagnosis: ALL (acute lymphoblastic leukemia) (H) [C91.00]   Procedure(s):  NON -APHERESIS Double lumen tunneled central burns line placement     LABS:  CBC:   Lab Results   Component Value Date    WBC 0.5 (LL) 2020    WBC 0.7 (LL) 2020    HGB 8.0 (L) 2020    HGB 8.1 (L) 2020    HCT 24.1 (L) 2020    HCT 24.5 (L) 2020    PLT 29 (LL) 2020    PLT 17 (LL) 2020     BMP:   Lab Results   Component Value Date     2020     2020    POTASSIUM 3.8 2020    POTASSIUM 4.2 2020    CHLORIDE 106 2020    CHLORIDE 103 2020    CO2 28 2020    CO2 27 2020    BUN 18 2020    BUN 22 2020    CR 0.70 2020    CR 0.72 2020    GLC 96 2020     (H) 2020     COAGS:   Lab Results   Component Value Date    PTT 58 (H) 2020    INR 1.13 2020    FIBR 140 (L) 2020     POC:   Lab Results   Component Value Date    BGM 98 2020     OTHER:   Lab Results   Component Value Date    ELSA 8.8 2020    PHOS 5.0 (H) 2020    MAG 2.0 2020    ALBUMIN 3.6 2020    PROTTOTAL 6.4 (L) 2020     (H) 2020    AST 32 2020     (H) 2020    ALKPHOS 73 2020    BILITOTAL 2.0 (H) 2020    TSH 4.20 (H) 2019    T4 0.88 2019    CRP <2.9 2020        Preop Vitals    BP Readings from Last 3 Encounters:   20 97/65   20 (!) 88/56   20 110/71    Pulse Readings from Last 3 Encounters:   20 75   20 98   20 91      Resp Readings from Last 3 Encounters:   20 18   20 16   20 16    SpO2 Readings from Last 3 Encounters:   20 99%   20 99%   20 100%      Temp Readings from Last 1 Encounters:   20 36.8  C  "(98.3  F) (Oral)    Ht Readings from Last 1 Encounters:   08/18/20 1.743 m (5' 8.62\")      Wt Readings from Last 1 Encounters:   08/24/20 59.2 kg (130 lb 8.2 oz)    Estimated body mass index is 19.49 kg/m  as calculated from the following:    Height as of 8/18/20: 1.743 m (5' 8.62\").    Weight as of an earlier encounter on 8/24/20: 59.2 kg (130 lb 8.2 oz).     LDA:  Port A Cath Single 06/10/20 Right Chest wall (Active)   Access Date 08/17/20 08/17/20 1505   Access Attempts 1 08/17/20 1505   Gauge Power noncoring 90 degree bend;20 gauge;3/4 inch 08/17/20 1505   Site Assessment WDL 08/17/20 1505   Line Status Blood return noted;Heparin locked 08/17/20 1505   Extravasation? No 08/11/20 1200   Dressing Intervention Transparent 07/20/20 1239   Needle Change Due 07/24/20 07/20/20 0800   Line Necessity Yes, meets criteria 08/04/20 1200   De-Access Date 08/17/20 08/17/20 1505   Date to be Reflushed 09/14/20 08/17/20 1505   Number of days: 75       CVC Double Lumen 07/20/20 Left Internal jugular (Active)   Site Assessment WDL 08/24/20 0945   External Cath Length (cm) 2 cm 07/20/20 0000   Dressing Intervention New dressing;Securing device;Transparent;Chlorhexidine sponge 08/21/20 0900   Dressing Change Due 08/28/20 08/21/20 0900   Lumen A - Color RED 08/24/20 0945   Lumen A - Status infusing 08/24/20 0945   Lumen A - Cap Change Due 08/28/20 08/21/20 0900   Lumen B - Color PURPLE 08/24/20 0945   Lumen B - Status heparin locked 08/24/20 0945   Lumen B - Cap Change Due 08/28/20 08/21/20 0900   Number of days: 35        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)      History of blood transfusion      WPW (Aaron-Parkinson-White syndrome) 03/2018      Past Surgical History:   Procedure Laterality Date     BONE MARROW BIOPSY, BONE SPECIMEN, NEEDLE/TROCAR Right 11/27/2018    Procedure: bone marrow biopsy;  Surgeon: Jacqueline Fitzgerald NP;  Location:  PEDS SEDATION      BONE MARROW " BIOPSY, BONE SPECIMEN, NEEDLE/TROCAR N/A 2/8/2019    Procedure: BIOPSY BONE MARROW;  Surgeon: Lisa Workman NP;  Location: [facility]S [facility]      BONE MARROW BIOPSY, BONE SPECIMEN, NEEDLE/TROCAR N/A 5/16/2019    Procedure: BIOPSY, BONE MARROW;  Surgeon: Lilia López APRN CNP;  Location: [facility] OR     BONE MARROW BIOPSY, BONE SPECIMEN, NEEDLE/TROCAR N/A 11/7/2019    Procedure: Bone marrow biopsy;  Surgeon: Jacqueline Shin NP;  Location: [facility] P[facility]S SEDATION      BONE MARROW BIOPSY, BONE SPECIMEN, NEEDLE/TROCAR Right 6/10/2020    Procedure: BIOPSY, BONE MARROW;  Surgeon: Candido Han PA-C;  Location: [facility]      ESOPHAGOSCOPY, GASTROSCOPY, DUODENOSCOPY (EGD), COMBINED N/A 2/22/2019    Procedure: Upper endoscopy with biopsy;  Surgeon: Magdalene Hobson MD;  Location: [facility] PEDS SED[facility]      INSERT CATHETER VASCULAR ACCESS N/A 7/20/2020    Procedure: NON -APHERESIS Double lumen tunneled central burns line placement;  Surgeon: Pato Gómez PA-C;  Location: [facility] SED[facility]      INSERT CATHETER VASCULAR ACCESS APHERESIS CHILD N/A 6/10/2020    Procedure: Large Bore Double Lumen NON TUNNELED Apheresis Catheter placement;  Surgeon: Link Rios PA-C;  Location: [facility]      INSERT CATHETER VASCULAR ACCESS DOUBLE LUMEN CHILD N/A 10/26/2018    Procedure: double lumen tunneled line placement;  Surgeon: Rex Valente MD;  Location: [facility]      INSERT PORT VASCULAR ACCESS N/A 6/10/2020    Procedure: Port placement;  Surgeon: Link Rios PA-C;  [facility]: [facility]      IR CHEST PORT PLACEMENT > 5 YRS OF AGE  6/10/2020     IR CVC NON TUNNEL LINE REMOVAL  6/12/2020     IR CVC NON TUNNEL PLACEMENT  6/10/2020     IR CVC TUNNEL PLACEMENT > 5 YRS OF AGE  7/20/2020     IR CVC TUNNEL REMOVAL LEFT  2/8/2019     IR PORT REMOVAL LEFT  5/16/2019     O CLAVICLE LEFT Left     fracture with surgical repair and plate/screws     ORTHOPEDIC SURGERY      femur     REMOVE  CATHETER VASCULAR ACCESS N/A 2/8/2019    Procedure: Tunneled line removal;  Surgeon: Krystal Esparza MD;  Location: [facility]      REMOVE PORT VASCULAR ACCESS N/A 5/16/2019    Procedure: REMOVAL, VASCULAR ACCESS PORT;  Surgeon: Link Rios PA-C;  Location: [facility] OR      Allergies   Allergen Reactions     Blood Transfusion Related (Informational Only) Other (See Comments)     Patient has a history of a clinically significant antibody against RBC antigens.  A delay in compatible RBCs may occur.Stem cell transplant patient.  Give type O RBCs.  Requires Benadryl and tylenol as premed for platelets and RBCs for hx of hives     Voriconazole Photosensitivity     Significant rash - do not rechallenge        Anesthesia Evaluation     . Pt has had prior anesthetic. Type: General and MAC    No history of anesthetic complications          ROS/MED HX    ENT/Pulmonary:  - neg pulmonary ROS    (-) asthma and Other pulmonary disease   Neurologic:  - neg neurologic ROS     Cardiovascular: Comment: WPW 2/18 - one episode of syncope--patient denies was due to WPW    ziopath 10/18:  Cannot rule out pre-excitation at higher HR.    CONCLUSIONS------7/15/2020  Patient after bone marrow transplant. Normal echocardiogram. The left and  right ventricles have normal chamber size, wall thickness, and systolic  function. The calculated biplane left ventricular ejection fraction is 62 %.  No pericardial effusion.      (+) ----. : . . . :. . Previous cardiac testing Echodate:9/19results:Normal echocardiogram. There is normal appearance and motion of the tricuspid,  mitral, pulmonary and aortic valves. No atrial, ventricular or arterial level  shunting. The calculated biplane left ventricular ejection fraction is 65-70%.  Normal right and left ventricular size and function.date: results: date: results: date: results:          METS/Exercise Tolerance:  >4 METS   Hematologic:     (+) Anemia, History of Transfusion Other  Hematologic Disorder (thrombocytopenia)-pancytopenia      Musculoskeletal:  - neg musculoskeletal ROS       GI/Hepatic:     (+) GERD Asymptomatic on medication,       Renal/Genitourinary:  - ROS Renal section negative       Endo:  - neg endo ROS       Psychiatric:  - neg psychiatric ROS       Infectious Disease:  - neg infectious disease ROS       Malignancy:   (+) Malignancy History of Lymphoma/Leukemia  Lymph CA Active status post, 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 Rate: Normal     Resp. Effort: Normal      CV: Rhythm: Regular  Rate: Age appropriate  Heart: Normal Sounds  Edema: None   Comments:      Dental: Normal Dentition                Assessment:   ASA SCORE: 3    H&P: History and physical reviewed and following examination; no interval change.   Smoking Status:  Non-Smoker/Unknown   NPO Status: NPO Appropriate     Plan:   Anes. Type:  MAC   Pre-Medication: None   Induction:  IV (Standard)   Airway: Native Airway   Access/Monitoring: PIV   Maintenance: Propofol Sedation     Postop Plan:   Postop Pain: None  Postop Sedation/Airway: Not planned  Disposition: Outpatient     PONV Management:   Adult Risk Factors:, Non-Smoker   Prevention:, Propofol     CONSENT: Direct conversation   Plan and risks discussed with: Patient; Father   Blood Products: Consent Deferred (Minimal Blood Loss)       Comments for Plan/Consent:  MAC with propofol  Risks versus benefits discussed. All questions answered                     Marian Redman MD

## 2020-08-24 NOTE — ANESTHESIA CARE TRANSFER NOTE
Patient: Artemio Nino    Procedure(s):  Bone marrow biopsy  LUMBAR PUNCTURE, DIAGNOSTIC  tunneled line removal    Diagnosis: ALL (acute lymphoblastic leukemia) (H) [C91.00]  Diagnosis Additional Information: No value filed.    Anesthesia Type:   MAC     Note:  Airway :Nasal Cannula  Patient transferred to: Recovery  Handoff Report: Identifed the Patient, Identified the Reponsible Provider, Reviewed the pertinent medical history, Discussed the surgical course, Reviewed Intra-OP anesthesia mangement and issues during anesthesia, Set expectations for post-procedure period and Allowed opportunity for questions and acknowledgement of understanding      Vitals: (Last set prior to Anesthesia Care Transfer)    CRNA VITALS  8/24/2020 1138 - 8/24/2020 1211      8/24/2020             Resp Rate (observed): 18                Electronically Signed By: SNOW Bedolla CRNA  August 24, 2020  12:11 PM

## 2020-08-24 NOTE — TRANSFUSION REACTION (BLOOD)
"Laboratory Medicine and Pathology  Transfusion Medicine- Transfusion Reaction    Artemio Nino MRN# [id number]   YOB: 2000 Age: 20 year old   Date of Reaction: 8/21/20       Transfusion Reaction Evaluation   Impression  Non-severe allergic transfusion reaction with hives and hypotension.    Recommendations    1. Transfuse as needed, continue to premedicate with Benadryl and acetaminophen   2. No evidence of immune-mediated hemolysis  3. If positive, final culture results will be called to the clinical team and reported in an addendum.     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 The patient has a history of allergic transfusion reactions, and was thus premedicated with 50mg of Benadryl and  650mg acetaminophen prior to the transfusion. Towards the end of the transfusion, the patient was noticed to have 3 small hives, one on the bottom lip, and 2 on the forearm. The transfusion was stopped at this time with less than 10 ml remaining. The patient did not report any other symptoms at this time, and reported that he develops this kind of reaction \"80% of the time\" despite premeds, and that the hives eventually resolve without intervention. The patient's blood pressure after transfusion was found to be 88/56, down from 105/68 prior to transfusion. The patient's urticarial reaction was observed for 30 minutes, and the symptoms did not progress. He was deemed stable enough to leave the clinic at this time despite the low blood pressure.     Reported Symptoms  Uticaria  Hypotension    Vitals    Pre-transfusion vital signs (taken at 0849 on 8/21/2020)  Blood pressure 105/68 mm Hg   Pulse 94 bpm   Temperature " 36.7 C   Respiratory Rate 18 per minute   O2 Saturation 99 %     Vitals signs during suspected reaction (taken at 1143 on 8/21/2020)  Blood pressure 88/56 mm Hg   Pulse 98 bpm   Temperature 36.8 C   Respiratory Rate 16 per minute   O2 Saturation 100 %       Blood Bank Investigation  Product Type: platelets  Unit Number: [id number]  Amount Remaining: less than 10 mL  Post-Transfusion Clerical Check: Correct  ABO/Rh: The unit type was A+ and the patient was A-. The unit was compatible.  BELTRAN: not performed  Post-Transfusion Plasma: not recorded    Gram stain showed no organisms and culture is no growth to date, pending final.    [facility] Hemovigilance  Case Definition: definitive allergic reaction  Severity: non-severe  Imputability: Definite    Julianne Floyd [facility]  Transfusion service  Department of Laboratory Medicine and Pathology  [facility]    Attestation:  I have reviewed the pertinent lab and clinical data, I have discussed this patient's transfusion reaction work-up with the medical student (Julianne Floyd), and I agree with her impression and recommendations.  This reaction fits the definition of a definitive non-severe allergic reaction of definite imputability.  If sterility cultures are positive, the care team will be notified immediately, and an addendum will be entered.    Alvaro Bowles M.D.  Professor, Transfusion Medicine  Laboratory Medicine & Pathology  Pager: [phone]    TRANSFUSION REACTION FINAL ADDENDUM 9/12/2020  I have personally reviewed the clinical and laboratory features of the reported transfusion reaction.    Additional Laboratory Results  Unit gram stain: no organisms seen  Unit culture: no growth  Patient post transfusion blood culture: not obtained    Final assessment is unchanged from the preliminary assessment.  The patient is a 20 year old male who experienced urticaria and hypotension with a platelet transfusion.  The reaction meets the definition of a non-severe  allergic reaction.  The imputability is definite. No evidence of a hemolytic transfusion reaction.  No evidence of bacterial contamination.   Recommend transfuse as needed and continue to premedicate with diphenhydramine and acetaminophen.    Sherice Reyez M.D., Ph.D.  Attending Physician  Division of Transfusion Medicine  Department of Laboratory [place] [address]  Pager [phone]

## 2020-08-24 NOTE — PROCEDURES
BMT Bone Marrow Biopsy Procedure Note  August 24, 2020 1:55 PM    DIAGNOSIS: ALL s/p CAR-T    PROCEDURE: Unilateral Bone Marrow Biopsy and Aspirate    SITE: Pediatric Sedation Suite    Patient s identification was positively verified by verbal identification and invasive procedure safety checklist was completed.  Informed consent was obtained. Following the administration of propofol as sedation, patient was placed in the  left lateral decubitus position and prepped and draped in a sterile manner.  Approximately 2 cc of 1% Lidocaine was used over the right posterior iliac spine.  Following this a 3 mm incision was made. Trephine bone marrow core and aspirates were sent for morphology, immunophenotyping, cytogenetics, and NGS. A total of approximately 20 ml of marrow was aspirated.  Following this procedure a sterile dressing was applied to the bone marrow biopsy site. The patient was placed in the supine position to maintain pressure on the biopsy site. Post-procedure wound care instructions were given. The patient tolerated the procedure well with no known discomfort.    Complications: None    BMT Lumbar Puncture Procedure Note    August 24, 2020    Procedure: Lumbar Puncture to obtain CSF     Diagnosis: ALL s/p CAR-T    IT chemotherapy: NO    Vitals reviewed:  YES    Informed Consent: Artemio Nino was identified by facial recognition and ID arm band. Procedure, benefits, risks, and alternatives explained to the patient and patient's parent who verbalized understanding of the information and agreed to proceed with lumbar puncture. Risks include bleeding, headache, and or infection.  Patient safety checklist completed.    Labs: Reviewed:      Platelet count:   Recent Labs   Lab Test 08/24/20  1035   PLT 53*       Description: A time out was performed prior to the procedure. The patient was placed in the lateral decubitus position. Anatomic landmarks were identified by palpation. The L4-5 disc space was prepped  and draped in a sterile fashion. A 22 gauge, 2.5 inch needle was placed without success. Next, a needle was placed in the L2-L3 space per attending anesthesiologist who was successful on the first attempt.  4 mL spinal fluid collected. Specimen appears clear and colorless. Specimen sent for cell count and differential, cytology, protein, glucose and flow cytometry. The needle was removed and a bandage was applied to the site.  Patient tolerated well.    Complications: No     Disposition: Patient will remain on back for 1 hour post procedure. Avoid soaking in a tub tonight.  Call if develops headaches, fevers and or chills.     Performed by:     BENJA Rose-LORENZA  [facility] Blood and Marrow Transplant  [facility] [address]  [phone]  [phone]

## 2020-08-24 NOTE — DISCHARGE INSTRUCTIONS
[facility]  Pediatric Interventional Radiology  Discharge Instructions for Tunneled Central Venous Catheter Removal    Date of Procedure: 08/24/20    Today you had a Tunneled Central Venous Catheter Removed by HANNAH Matias    Activity    No strenuous activity for 1 week    No heavy lifting (greater than 10 pounds) for 3 days     No tub bath, hot tub, or swimming until Steri-strips are no longer there (about 7 days)    It is OK to shower.  Cover the dressing with plastic wrap before taking your shower.     Diet    Resume your regular diet    Discomfort     Pain medications as directed    Site Care    Keep the dressing dry and intact.  Keep the wound clean and dry for 3 days.  After 3 days you may remove the dressing and use Band-Aids until the wound is healed.  Do not remove the Steri-strips for at least 7 days.      If there is any oozing or bleeding from the site, apply direct pressure for 5-10 minutes with a gauze pad.  If bleeding continues after 10 minutes, call Pediatric Interventional Radiology.  If bleeding cannot be controlled with direct pressure, call 911.    If sedation was given:    DO NOT drive or operate heavy machinery for 24 hours    DO NOT drink alcoholic beverages for 24 hours    DO NOT make important legal decisions for 24 hours    You must have a responsible adult to drive you home and stay with you for 24 hours    Call your Doctor if:    Bleeding    Swelling in your neck or arm    Fever greater than 100.5 degrees F (oral)    Other signs of infection such as redness, tenderness, or drainage from the wound    If you have questions or concerns about this procedure:  Pediatric Interventional Radiology [phone] Mon-Fri, 7am to 5pm    [phone] After-hours, weekends, holidays  Ask for the Pediatric Interventional Radiologist on-call     Care for Bone Marrow Biopsy    Do not remove bandage/dressing for 24 hours -- after this time they can be  removed. If Steri-strips are presents they can stay on until they fall off    No bath, shower or soaking of the dressing for 24 hours    Activity as tolerated by the patient    Diet as able to tolerate    May use Tylenol as needed for pain control    Can apply icepack to the site for discomfort -- no more than 10 minutes at a time    If bleeding presents, apply pressure for 5 minutes    Call [phone] ask for Peds BMT/Hem/Onc fellow on call if complications arise including:     persistent bleeding    fever greater than 100.5    pain     [facility]   [phone]    Care post Lumbar Puncture     Do not remove bandage/dressing for 24 hours -- after this time they can be removed    No bath, shower or soaking of the dressing for 24 hours    Activity as tolerated by the patient    Diet as able to tolerated    May use Tylenol as needed for pain control -- DO NOT use Ibuprofen    Can apply icepack to the site for discomfort -- no more than 10 minutes at a time    If bleeding presents apply pressure for 5 minutes    Call [phone] ask for Peds BMT/Hem/Onc fellow on call if complications arise including:    persistent bleeding    fever greater than 100.5    Pain    Lumbar punctures can cause headache. If the pain is not controlled with Tylenol (acetaminophen) please call the Peds BMT/Hem/Onc fellow on call

## 2020-08-24 NOTE — PROGRESS NOTES
This is a recent snapshot of the patient's [facility] medical record.  For current drug dose and complete information and questions, call [phone] or In Basket pool, fv home infusion ([id number])  CSN Number:  [id number]

## 2020-08-25 LAB
APPEARANCE CSF: CLEAR
COLOR CSF: COLORLESS
COPATH REPORT: NORMAL
LOCATION PERFORMED: NORMAL
RBC # CSF MANUAL: 1 /UL (ref 0–2)
RESULT: NORMAL
SEND OUTS MISC TEST CODE: NORMAL
SEND OUTS MISC TEST SPECIMEN: NORMAL
TEST NAME: NORMAL
TUBE # CSF: 2 #
WBC # CSF MANUAL: 0 /UL (ref 0–5)

## 2020-08-25 RX ORDER — HEPARIN SODIUM,PORCINE 10 UNIT/ML
2 VIAL (ML) INTRAVENOUS
Status: CANCELLED | OUTPATIENT
Start: 2020-08-26

## 2020-08-26 ENCOUNTER — INFUSION THERAPY VISIT (OUTPATIENT)
Dept: INFUSION THERAPY | Facility: CLINIC | Age: 20
End: 2020-08-26
Attending: PEDIATRICS
Payer: COMMERCIAL

## 2020-08-26 ENCOUNTER — ONCOLOGY VISIT (OUTPATIENT)
Dept: TRANSPLANT | Facility: CLINIC | Age: 20
End: 2020-08-26
Attending: PEDIATRICS
Payer: COMMERCIAL

## 2020-08-26 VITALS
OXYGEN SATURATION: 99 % | HEART RATE: 90 BPM | TEMPERATURE: 98.1 F | RESPIRATION RATE: 16 BRPM | WEIGHT: 130.73 LBS | SYSTOLIC BLOOD PRESSURE: 100 MMHG | HEIGHT: 69 IN | BODY MASS INDEX: 19.36 KG/M2 | DIASTOLIC BLOOD PRESSURE: 67 MMHG

## 2020-08-26 DIAGNOSIS — C91.01 ACUTE LYMPHOBLASTIC LEUKEMIA (ALL) IN REMISSION (H): Primary | ICD-10-CM

## 2020-08-26 DIAGNOSIS — Z94.81 S/P ALLOGENEIC BONE MARROW TRANSPLANT (H): ICD-10-CM

## 2020-08-26 DIAGNOSIS — Z91.89 AT RISK FOR INFECTION: ICD-10-CM

## 2020-08-26 LAB
ALBUMIN SERPL-MCNC: 3.4 G/DL (ref 3.4–5)
ALP SERPL-CCNC: 73 U/L (ref 40–150)
ALT SERPL W P-5'-P-CCNC: 112 U/L (ref 0–70)
ANION GAP SERPL CALCULATED.3IONS-SCNC: 5 MMOL/L (ref 3–14)
ANISOCYTOSIS BLD QL SMEAR: SLIGHT
AST SERPL W P-5'-P-CCNC: 28 U/L (ref 0–45)
BASOPHILS # BLD AUTO: 0 10E9/L (ref 0–0.2)
BASOPHILS NFR BLD AUTO: 0.4 %
BILIRUB SERPL-MCNC: 1.8 MG/DL (ref 0.2–1.3)
BUN SERPL-MCNC: 16 MG/DL (ref 7–30)
CALCIUM SERPL-MCNC: 7.9 MG/DL (ref 8.5–10.1)
CHLORIDE SERPL-SCNC: 107 MMOL/L (ref 94–109)
CO2 SERPL-SCNC: 29 MMOL/L (ref 20–32)
COPATH REPORT: NORMAL
CREAT SERPL-MCNC: 0.72 MG/DL (ref 0.66–1.25)
DIFFERENTIAL METHOD BLD: ABNORMAL
EOSINOPHIL # BLD AUTO: 0 10E9/L (ref 0–0.7)
EOSINOPHIL NFR BLD AUTO: 0.4 %
ERYTHROCYTE [DISTWIDTH] IN BLOOD BY AUTOMATED COUNT: 20.6 % (ref 10–15)
GFR SERPL CREATININE-BSD FRML MDRD: >90 ML/MIN/{1.73_M2}
GLUCOSE SERPL-MCNC: 106 MG/DL (ref 70–99)
HCT VFR BLD AUTO: 26.6 % (ref 40–53)
HGB BLD-MCNC: 8.8 G/DL (ref 13.3–17.7)
LYMPHOCYTES # BLD AUTO: 0.3 10E9/L (ref 0.8–5.3)
LYMPHOCYTES NFR BLD AUTO: 34.9 %
MACROCYTES BLD QL SMEAR: PRESENT
MAGNESIUM SERPL-MCNC: 1.6 MG/DL (ref 1.6–2.3)
MCH RBC QN AUTO: 31.2 PG (ref 26.5–33)
MCHC RBC AUTO-ENTMCNC: 33.1 G/DL (ref 31.5–36.5)
MCV RBC AUTO: 94 FL (ref 78–100)
MICROCYTES BLD QL SMEAR: PRESENT
MONOCYTES # BLD AUTO: 0.1 10E9/L (ref 0–1.3)
MONOCYTES NFR BLD AUTO: 17 %
MYELOCYTES # BLD: 0 10E9/L
MYELOCYTES NFR BLD MANUAL: 1.3 %
NEUTROPHILS # BLD AUTO: 0.4 10E9/L (ref 1.6–8.3)
NEUTROPHILS NFR BLD AUTO: 46 %
NRBC # BLD AUTO: 0 10*3/UL
NRBC BLD AUTO-RTO: 2 /100
PHOSPHATE SERPL-MCNC: 4.2 MG/DL (ref 2.5–4.5)
PLATELET # BLD AUTO: 57 10E9/L (ref 150–450)
PLATELET # BLD EST: ABNORMAL 10*3/UL
POLYCHROMASIA BLD QL SMEAR: SLIGHT
POTASSIUM SERPL-SCNC: 3.5 MMOL/L (ref 3.4–5.3)
PROT SERPL-MCNC: 6.2 G/DL (ref 6.8–8.8)
RBC # BLD AUTO: 2.82 10E12/L (ref 4.4–5.9)
RBC INCLUSIONS BLD: SLIGHT
SODIUM SERPL-SCNC: 141 MMOL/L (ref 133–144)
WBC # BLD AUTO: 0.8 10E9/L (ref 4–11)

## 2020-08-26 PROCEDURE — 80053 COMPREHEN METABOLIC PANEL: CPT | Performed by: NURSE PRACTITIONER

## 2020-08-26 PROCEDURE — 25000128 H RX IP 250 OP 636: Mod: ZF | Performed by: NURSE PRACTITIONER

## 2020-08-26 PROCEDURE — 84100 ASSAY OF PHOSPHORUS: CPT | Performed by: NURSE PRACTITIONER

## 2020-08-26 PROCEDURE — 25800030 ZZH RX IP 258 OP 636: Mod: ZF | Performed by: NURSE PRACTITIONER

## 2020-08-26 PROCEDURE — 25000125 ZZHC RX 250: Mod: ZF | Performed by: NURSE PRACTITIONER

## 2020-08-26 PROCEDURE — 85025 COMPLETE CBC W/AUTO DIFF WBC: CPT | Performed by: NURSE PRACTITIONER

## 2020-08-26 PROCEDURE — 96365 THER/PROPH/DIAG IV INF INIT: CPT

## 2020-08-26 PROCEDURE — 83735 ASSAY OF MAGNESIUM: CPT | Performed by: NURSE PRACTITIONER

## 2020-08-26 RX ORDER — HEPARIN SODIUM (PORCINE) LOCK FLUSH IV SOLN 100 UNIT/ML 100 UNIT/ML
SOLUTION INTRAVENOUS
Status: DISCONTINUED
Start: 2020-08-26 | End: 2020-08-26 | Stop reason: HOSPADM

## 2020-08-26 RX ORDER — HEPARIN SODIUM,PORCINE 10 UNIT/ML
2 VIAL (ML) INTRAVENOUS
Status: CANCELLED | OUTPATIENT
Start: 2020-09-23

## 2020-08-26 RX ORDER — SULFAMETHOXAZOLE/TRIMETHOPRIM 800-160 MG
TABLET ORAL
Start: 2020-08-26 | End: 2021-01-26

## 2020-08-26 RX ADMIN — PENTAMIDINE ISETHIONATE 240 MG: 300 INJECTION, POWDER, LYOPHILIZED, FOR SOLUTION INTRAMUSCULAR; INTRAVENOUS at 14:01

## 2020-08-26 RX ADMIN — DEXTROSE MONOHYDRATE 50 ML: 50 INJECTION, SOLUTION INTRAVENOUS at 14:03

## 2020-08-26 ASSESSMENT — PAIN SCALES - GENERAL
PAINLEVEL: NO PAIN (0)
PAINLEVEL: NO PAIN (0)

## 2020-08-26 ASSESSMENT — MIFFLIN-ST. JEOR: SCORE: 1589.25

## 2020-08-26 NOTE — PROGRESS NOTES
Pediatric BMT Daily Progress Note  Date of Service: 8/26/20    Interval History: Artemio presents to clinic today for labs and exam. He is well appearing without clinical concerns. He had a mild headache following procedures on Monday, but this resolved eventually without intervention. Eating well, maintaining energy and in good spirits. No active bleeding, nausea, or bowel dysregulation. TYRONE per morphology and flow. Blood counts indicating recovering marrow.     Review of Systems: Pertinent positives include those mentioned in interval events. A complete review of systems was performed and is otherwise negative.      Medications:  Please see MAR    Physical Exam:    Vital Signs for Peds 8/26/2020 8/26/2020   SYSTOLIC 118 105   DIASTOLIC 82 68   PULSE 111 92   TEMPERATURE 97.6 98   RESPIRATIONS 16 18   WEIGHT (kg) 59.3 kg    HEIGHT (cm) 176.2 cm    BMI 19.1    pain     O2 99 99     GEN: Sitting on bed, pleasant and cooperative. NAD. Mother present.   HEENT: Full head of hair, NC/AT, sclerae anicteric, nares patent without rhinorrhea, OP clear without lesions, MMM.   CARD: RRR, normal S1/S2 without murmur. Cap refill < 2 seconds.  RESP: CTAB, good air entry throughout, no adventitious lung sounds.   ABD: Soft, nontender, nondistended. No organomegaly appreciated.   EXTREM: WWP, no edema.   SKIN: Pale, no bruises, rashes, or lesions present. BMBx site scab intact on RPIC.   NEURO: cranial nerves intact, grossly normal  ACCESS: DL Galvan to left chest C/D/I, right port unaccessed    Labs:  Results for orders placed or performed in visit on 08/26/20 (from the past 24 hour(s))   Phosphorus   Result Value Ref Range    Phosphorus 4.2 2.5 - 4.5 mg/dL   Magnesium   Result Value Ref Range    Magnesium 1.6 1.6 - 2.3 mg/dL   Comprehensive metabolic panel   Result Value Ref Range    Sodium 141 133 - 144 mmol/L    Potassium 3.5 3.4 - 5.3 mmol/L    Chloride 107 94 - 109 mmol/L    Carbon Dioxide 29 20 - 32 mmol/L    Anion Gap 5 3  - 14 mmol/L    Glucose 106 (H) 70 - 99 mg/dL    Urea Nitrogen 16 7 - 30 mg/dL    Creatinine 0.72 0.66 - 1.25 mg/dL    GFR Estimate >90 >60 mL/min/[1.73_m2]    GFR Estimate If Black >90 >60 mL/min/[1.73_m2]    Calcium 7.9 (L) 8.5 - 10.1 mg/dL    Bilirubin Total 1.8 (H) 0.2 - 1.3 mg/dL    Albumin 3.4 3.4 - 5.0 g/dL    Protein Total 6.2 (L) 6.8 - 8.8 g/dL    Alkaline Phosphatase 73 40 - 150 U/L     (H) 0 - 70 U/L    AST 28 0 - 45 U/L   CBC with platelets differential   Result Value Ref Range    WBC 0.8 (LL) 4.0 - 11.0 10e9/L    RBC Count 2.82 (L) 4.4 - 5.9 10e12/L    Hemoglobin 8.8 (L) 13.3 - 17.7 g/dL    Hematocrit 26.6 (L) 40.0 - 53.0 %    MCV 94 78 - 100 fl    MCH 31.2 26.5 - 33.0 pg    MCHC 33.1 31.5 - 36.5 g/dL    RDW 20.6 (H) 10.0 - 15.0 %    Platelet Count 57 (L) 150 - 450 10e9/L    Diff Method Manual Differential     % Neutrophils 46.0 %    % Lymphocytes 34.9 %    % Monocytes 17.0 %    % Eosinophils 0.4 %    % Basophils 0.4 %    % Myelocytes 1.3 %    Nucleated RBCs 2 (H) 0 /100    Absolute Neutrophil 0.4 (LL) 1.6 - 8.3 10e9/L    Absolute Lymphocytes 0.3 (L) 0.8 - 5.3 10e9/L    Absolute Monocytes 0.1 0.0 - 1.3 10e9/L    Absolute Eosinophils 0.0 0.0 - 0.7 10e9/L    Absolute Basophils 0.0 0.0 - 0.2 10e9/L    Absolute Myelocytes 0.0 0 10e9/L    Absolute Nucleated RBC 0.0     Anisocytosis Slight     Polychromasia Slight     RBC Fragments Slight     Microcytes Present     Macrocytes Present     Platelet Estimate Decreased      Assessment/Plan:  Artemio is a 20 year old male with 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 He was discharged on 8/14 following his admission for febrile neutropenia and Kymriah infusion. His course was complicated by grade 3 CRS with hypotension and CAR-T neurotoxicity requiring 3 doses of tociluzimab, fluid boluses, dopamine, and steroids. He continues with clinical wellbeing in the outpatient setting- counts recovering, bone marrow and CSF with TYRONE per morphology and flow.     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 Relapsed 6/5/2020, 19 months status post matched related BMT per [id number] (cytoxan/TBI arm) with CD19 (dim) relapsed b-cell lymphoblastic leukemia. Completed apheresis collection in the [facility] (6/2020). Bridging chemotherapy with oral 6MP, methotrexate and ruxolitinib. All oral chemotherapy stopped on 7/12. BM revealed 75% involvement with leukemia. CSF was negative. Outpatient lymphodepleting chemotherapy per [id number] with cytoxan and fludarabine 7/21-7/24 completed as an outpatient. [facility] infusion inpatient 7/27. Counts recovering.   - Bone marrow biopsy (8/24): TYRONE per flow and morphology; FISH and chromosomes pending  - CSF via LP (8/24): TYRONE per flow     # History of CRS: Date of onset: 7/28/20, initial grade 2 with max grade 3 (8/6). S/p IVF, dopamine, tociluzimab x 3, and Methylprednisolone. Resolved.     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 S/p narctoics and methylprednisolone with symptom resolution. Steroid wean completed, off since 8/18.     FEN/Renal:   # Risk for malnutrition: Intake improving significantly since discharge. Off TPN since 8/21.     # Risk for electrolyte abnormalities:  - Hypophosphatemia: normal today     # Risk for renal dysfunction/risk for ELIZ: GFR (7/16) 133ml/min   - Monitor weights and labs      # Risk for TLS: no indications, uric acid normal today     Pulmonary:  # Risk for pulmonary insufficiency: No current concerns. Normal chest and sinus CTs during work-up.      Cardiovascular:  # Aaron-Parkinson-White Syndrome: diagnosed upon syncope in 02/2018. Asymptomatic, no interventions to date. No current concerns. EKG (7/15) revealed WPW with normal sinus rhythm. Echocardiogram (7/15) showed LVEF of 62%. EKG (8/12) showed resolution of WPW.     # History of asymptomatic bradycardia: Titrated off dopamine on 8/11, subsequent day experienced  asymptomatic bradycardia; confirmed sinus bradycardia on EKG.  Now resolved.     # History of hypotension related to CRS: s/p dopamine. Resolved    Heme:   # Pancytopenia secondary to chemotherapy:   - Transfuse for hemoglobin < 7, plts 10k.  - Hx of hives with both pRBCs and platelets- premedicate with tylenol and benadryl. Per transfusion med, no need for additional intervention or special product processing. Consider methylprednisolone addition as premed.   - No GCSF per protocol     # Coagulopathy related to CRS: s/p numerous doses of cryo (8/9-11). Coagulopathy labs improving, 8/17- INR 1.23, hepzymed PTT 26, fibrinogen 140, D-Dimer 1.5  - Continue to monitor per protocol, next due d+28     Infectious Disease:  # Risk for infection given immunocompromised status with need for prophylaxis:           - Viral: HD Valtrex continue through Day +30. CMV PCR neg (8/21).   - Fungal: Avoid Voriconazole given hx of photoxic drug eruption. Micafungin was discontinued at discharge.   - Bacterial: Levofloxacin 500 mg daily until ANC > 1000.  - PCP: Will not start Bactrim today given neutropenia. IV Pentamidine given today, 8/24.       GI:   # Nausea: zofran PRN     # Acid reflux: Protonix, TUMS prn     # Transaminitis: Improving  - Continue to monitor.      Fertility:  Sperm collected pre chemotherapy for fertility preservation    Disposition: Artemio will return Wednesday for labs and exam with Dr. Zavala.     BENJA Rose-[facility] Blood and Marrow Transplant  [facility]  [address] [place] [id number]  [phone]  [phone]    Patient Active Problem List   Diagnosis     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)     Aaron-Parkinson-White (WPW) syndrome     Bone marrow transplant candidate     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)     At risk for infection     S/P allogeneic bone marrow transplant (H)     Acute  lymphoblastic leukemia (ALL) in relapse (H)     Fever     Neutropenic fever (H)

## 2020-08-26 NOTE — PATIENT INSTRUCTIONS
Return to [facility] for labs and exam with Dr. Zavala on 8/28, 11:30 arrival    Infusion needs: None    Patient has Port-a-cath line, to be drawn off of per lab.     Medication changes:   - Discontinue Valtrex    Care plan changes: None    Contact information  During business hours (7:30am-4:30pm):   To leave a non-urgent voicemail: call triage line [phone]    To call for time-sensitive needs or concerns : call clinic  [phone]    Evenings after 4:30pm, weekends, and holidays:   For any needs or concerns: call for BMT fellow at [phone] in the case of an emergency    Thank you!         Appointment already scheduled as of 8/27 at 907am MILLER

## 2020-08-26 NOTE — PROGRESS NOTES
Infusion Nursing Note    Artemio Nino Presents to [facility] center today for: IV Pentamidine infusion    Due to :    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)  S/P allogeneic bone marrow transplant (H)    Intravenous Access/Labs: Port accessed without difficulty . Labs drawn as ordered.      Infusion Note: IV pentamidine infused over one hour and completed without complication.     Post Infusion Assessment: Patient tolerated infusion, Vital signs remained stable throughout and Port flushed, heparin locked and de-accessed without issue    Discharge Plan:   mother verbalized understanding of discharge instructions. Pt left [facility] in stable condition.

## 2020-08-27 ENCOUNTER — HOME INFUSION (PRE-WILLOW HOME INFUSION) (OUTPATIENT)
Dept: PHARMACY | Facility: CLINIC | Age: 20
End: 2020-08-27

## 2020-08-27 NOTE — PROGRESS NOTES
This is a recent snapshot of the patient's [facility] medical record.  For current drug dose and complete information and questions, call [phone] or In Basket pool, fv home infusion ([id number])  CSN Number:  [id number]

## 2020-08-28 ENCOUNTER — ONCOLOGY VISIT (OUTPATIENT)
Dept: TRANSPLANT | Facility: CLINIC | Age: 20
End: 2020-08-28
Attending: PEDIATRICS
Payer: COMMERCIAL

## 2020-08-28 ENCOUNTER — INFUSION THERAPY VISIT (OUTPATIENT)
Dept: INFUSION THERAPY | Facility: CLINIC | Age: 20
End: 2020-08-28
Attending: PEDIATRICS
Payer: COMMERCIAL

## 2020-08-28 VITALS
RESPIRATION RATE: 16 BRPM | OXYGEN SATURATION: 99 % | SYSTOLIC BLOOD PRESSURE: 108 MMHG | WEIGHT: 131.84 LBS | TEMPERATURE: 97.6 F | DIASTOLIC BLOOD PRESSURE: 71 MMHG | BODY MASS INDEX: 19.53 KG/M2 | HEIGHT: 69 IN | HEART RATE: 111 BPM

## 2020-08-28 DIAGNOSIS — C91.01 ACUTE LYMPHOBLASTIC LEUKEMIA (ALL) IN REMISSION (H): ICD-10-CM

## 2020-08-28 DIAGNOSIS — C91.01 ACUTE LYMPHOBLASTIC LEUKEMIA (ALL) IN REMISSION (H): Primary | ICD-10-CM

## 2020-08-28 DIAGNOSIS — C91.02 ACUTE LYMPHOBLASTIC LEUKEMIA (ALL) IN RELAPSE (H): ICD-10-CM

## 2020-08-28 LAB
ACANTHOCYTES BLD QL SMEAR: SLIGHT
ALBUMIN SERPL-MCNC: 3.8 G/DL (ref 3.4–5)
ALP SERPL-CCNC: 75 U/L (ref 40–150)
ALT SERPL W P-5'-P-CCNC: 83 U/L (ref 0–70)
ANION GAP SERPL CALCULATED.3IONS-SCNC: 7 MMOL/L (ref 3–14)
ANISOCYTOSIS BLD QL SMEAR: ABNORMAL
AST SERPL W P-5'-P-CCNC: 20 U/L (ref 0–45)
BASOPHILS # BLD AUTO: 0 10E9/L (ref 0–0.2)
BASOPHILS NFR BLD AUTO: 0.9 %
BILIRUB SERPL-MCNC: 1.6 MG/DL (ref 0.2–1.3)
BUN SERPL-MCNC: 10 MG/DL (ref 7–30)
CA-I BLD-MCNC: 5.2 MG/DL (ref 4.4–5.2)
CALCIUM SERPL-MCNC: 8.6 MG/DL (ref 8.5–10.1)
CHLORIDE SERPL-SCNC: 109 MMOL/L (ref 94–109)
CO2 SERPL-SCNC: 26 MMOL/L (ref 20–32)
CREAT SERPL-MCNC: 0.7 MG/DL (ref 0.66–1.25)
CREAT UR-MCNC: 239 MG/DL
DIFFERENTIAL METHOD BLD: ABNORMAL
EOSINOPHIL # BLD AUTO: 0 10E9/L (ref 0–0.7)
EOSINOPHIL NFR BLD AUTO: 0.9 %
ERYTHROCYTE [DISTWIDTH] IN BLOOD BY AUTOMATED COUNT: 23.2 % (ref 10–15)
GFR SERPL CREATININE-BSD FRML MDRD: >90 ML/MIN/{1.73_M2}
GLUCOSE SERPL-MCNC: 108 MG/DL (ref 70–99)
HCT VFR BLD AUTO: 30.5 % (ref 40–53)
HGB BLD-MCNC: 10.1 G/DL (ref 13.3–17.7)
LYMPHOCYTES # BLD AUTO: 0.2 10E9/L (ref 0.8–5.3)
LYMPHOCYTES NFR BLD AUTO: 24.3 %
MACROCYTES BLD QL SMEAR: PRESENT
MAGNESIUM SERPL-MCNC: 1.8 MG/DL (ref 1.6–2.3)
MCH RBC QN AUTO: 31.5 PG (ref 26.5–33)
MCHC RBC AUTO-ENTMCNC: 33.1 G/DL (ref 31.5–36.5)
MCV RBC AUTO: 95 FL (ref 78–100)
MONOCYTES # BLD AUTO: 0.2 10E9/L (ref 0–1.3)
MONOCYTES NFR BLD AUTO: 19 %
NEUTROPHILS # BLD AUTO: 0.5 10E9/L (ref 1.6–8.3)
NEUTROPHILS NFR BLD AUTO: 54.9 %
NRBC # BLD AUTO: 0 10*3/UL
NRBC BLD AUTO-RTO: 0 /100
PLATELET # BLD AUTO: 61 10E9/L (ref 150–450)
PLATELET # BLD EST: ABNORMAL 10*3/UL
POIKILOCYTOSIS BLD QL SMEAR: SLIGHT
POTASSIUM SERPL-SCNC: 3.8 MMOL/L (ref 3.4–5.3)
PROT SERPL-MCNC: 6.8 G/DL (ref 6.8–8.8)
PROT UR-MCNC: 0.53 G/L
PROT/CREAT 24H UR: 0.22 G/G CR (ref 0–0.2)
RBC # BLD AUTO: 3.21 10E12/L (ref 4.4–5.9)
RBC INCLUSIONS BLD: SLIGHT
SODIUM SERPL-SCNC: 142 MMOL/L (ref 133–144)
WBC # BLD AUTO: 0.9 10E9/L (ref 4–11)

## 2020-08-28 PROCEDURE — 83735 ASSAY OF MAGNESIUM: CPT | Performed by: NURSE PRACTITIONER

## 2020-08-28 PROCEDURE — 80053 COMPREHEN METABOLIC PANEL: CPT | Performed by: NURSE PRACTITIONER

## 2020-08-28 PROCEDURE — 36591 DRAW BLOOD OFF VENOUS DEVICE: CPT

## 2020-08-28 PROCEDURE — G0463 HOSPITAL OUTPT CLINIC VISIT: HCPCS | Mod: ZF

## 2020-08-28 PROCEDURE — 84156 ASSAY OF PROTEIN URINE: CPT | Performed by: NURSE PRACTITIONER

## 2020-08-28 PROCEDURE — 82330 ASSAY OF CALCIUM: CPT | Performed by: NURSE PRACTITIONER

## 2020-08-28 PROCEDURE — 25000128 H RX IP 250 OP 636: Mod: ZF | Performed by: PEDIATRICS

## 2020-08-28 PROCEDURE — 85025 COMPLETE CBC W/AUTO DIFF WBC: CPT | Performed by: NURSE PRACTITIONER

## 2020-08-28 RX ORDER — HEPARIN SODIUM (PORCINE) LOCK FLUSH IV SOLN 100 UNIT/ML 100 UNIT/ML
5 SOLUTION INTRAVENOUS
Status: DISCONTINUED | OUTPATIENT
Start: 2020-08-28 | End: 2020-08-28 | Stop reason: HOSPADM

## 2020-08-28 RX ORDER — LEVOFLOXACIN 500 MG/1
500 TABLET, FILM COATED ORAL DAILY
Qty: 30 TABLET | Refills: 3 | Status: SHIPPED | OUTPATIENT
Start: 2020-08-28 | End: 2020-09-25

## 2020-08-28 RX ADMIN — SODIUM CHLORIDE, PRESERVATIVE FREE 5 ML: 5 INJECTION INTRAVENOUS at 12:00

## 2020-08-28 ASSESSMENT — PAIN SCALES - GENERAL: PAINLEVEL: NO PAIN (0)

## 2020-08-28 ASSESSMENT — MIFFLIN-ST. JEOR: SCORE: 1589.25

## 2020-08-28 NOTE — PROGRESS NOTES
This is a recent snapshot of the patient's [facility] medical record.  For current drug dose and complete information and questions, call [phone] or In Basket pool, fv home infusion ([id number])  CSN Number:  [id number]

## 2020-08-28 NOTE — PROGRESS NOTES
Artemio was added to infusion schedule for port access/labs.  Port accessed using sterile technique, per protocol.  Labs drawn via port as ordered.  Line Heparin locked and de-accessed without complication.

## 2020-08-28 NOTE — NURSING NOTE
"Chief Complaint   Patient presents with     RECHECK     Patient here today for Anniversary Visit     /71 (BP Location: Right arm, Patient Position: Sitting, Cuff Size: Adult Regular)   Pulse 111   Temp 97.6  F (36.4  C) (Oral)   Resp 16   Ht 1.754 m (5' 9.06\")   Wt 59.8 kg (131 lb 13.4 oz)   SpO2 99%   BMI 19.44 kg/m      No Pain (0)  Data Unavailable    I have reviewed the patients medication and allergy list.    Patient needs refills: yes Levaquin    Dressing change needed? No    EKG needed? No    Zhane Frost CMA  August 28, 2020  "

## 2020-08-28 NOTE — PATIENT INSTRUCTIONS
Return to [facility] for day +60 appointments/procedures as already scheduled 9/25    Infusion needs: infusion appointment added to 9/25 for port-access and labs    Patient has port-a-cath, to be drawn off of per lab.     Medication changes: none    Care plan changes[facility], planning to stop levaquin when ANC>1000    Contact information  During business hours (7:30am-4:30pm):   To leave a non-urgent voicemail: call triage line [phone]    To call for time-sensitive needs or concerns : call clinic  [phone]    Evenings after 4:30pm, weekends, and holidays:   For any needs or concerns: call for BMT fellow at [phone] in the case of an emergency    Thank you!       Appointments already scheduled as of 8/31 at 1012am MILLER

## 2020-08-29 NOTE — PROGRESS NOTES
"08/28/20     Dear Dr. Paniagua,    We had the pleasure of seeing Artemio Nino in our [facility] today. 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 His day 28 studies showed no evidence of disease and his absolute CD19 remains <1. His NGS is pending.    Isael has been doing great at home. He reports that his appetite is back to 90% of normal. His energy is improving and he thinks he may be ready to start playing soccer again. He denies any nausea. No pain. Sleeping well. No headaches or difficulty with vision or hearing. No neck pain.     Review of Systems: Pertinent positives include those mentioned in interval events. A complete review of systems was performed and is otherwise negative.       Family History: Unchanged from previous visit    Social History: Mom will be going to [place] tomorrow and Isael is going back to move in with his Dad and grandparents in [place].    Medications:  1. Levofloxacin 500 mg PO daily     Physical Exam:  /71 (BP Location: Right arm, Patient Position: Sitting, Cuff Size: Adult Regular)   Pulse 111   Temp 97.6  F (36.4  C) (Oral)   Resp 16   Ht 1.754 m (5' 9.06\")   Wt 59.8 kg (131 lb 13.4 oz)   SpO2 99%   BMI 19.44 kg/m    Karnofsky is 100  GEN: Sitting up on examining table. Talkative with examiner. Thin adolescent male. No acute distress.  HEENT: NC/AT, full head of hair.    LYMPH: No adenopathy  CARD: Regular rate and rhythm. Heart sounds dual, no murmurs, rubs or gallops. Cap refill <2 seconds.  RESP: Clear on auscultation, good bilateral air entry. No increased work of breathing, crackles or wheezes.   ABD: Non-distended, non-tender, no organomegaly  EXTREM: WWP     Labs:    Results for " orders placed or performed in visit on 08/28/20   CBC with platelets differential     Status: Abnormal   Result Value Ref Range    WBC 0.9 (LL) 4.0 - 11.0 10e9/L    RBC Count 3.21 (L) 4.4 - 5.9 10e12/L    Hemoglobin 10.1 (L) 13.3 - 17.7 g/dL    Hematocrit 30.5 (L) 40.0 - 53.0 %    MCV 95 78 - 100 fl    MCH 31.5 26.5 - 33.0 pg    MCHC 33.1 31.5 - 36.5 g/dL    RDW 23.2 (H) 10.0 - 15.0 %    Platelet Count 61 (L) 150 - 450 10e9/L    Diff Method Manual Differential     % Neutrophils 54.9 %    % Lymphocytes 24.3 %    % Monocytes 19.0 %    % Eosinophils 0.9 %    % Basophils 0.9 %    Nucleated RBCs 0 0 /100    Absolute Neutrophil 0.5 (L) 1.6 - 8.3 10e9/L    Absolute Lymphocytes 0.2 (L) 0.8 - 5.3 10e9/L    Absolute Monocytes 0.2 0.0 - 1.3 10e9/L    Absolute Eosinophils 0.0 0.0 - 0.7 10e9/L    Absolute Basophils 0.0 0.0 - 0.2 10e9/L    Absolute Nucleated RBC 0.0     Anisocytosis Marked     Poikilocytosis Slight     RBC Fragments Slight     Acanthocytes Slight     Macrocytes Present     Platelet Estimate Decreased    Comprehensive metabolic panel     Status: Abnormal   Result Value Ref Range    Sodium 142 133 - 144 mmol/L    Potassium 3.8 3.4 - 5.3 mmol/L    Chloride 109 94 - 109 mmol/L    Carbon Dioxide 26 20 - 32 mmol/L    Anion Gap 7 3 - 14 mmol/L    Glucose 108 (H) 70 - 99 mg/dL    Urea Nitrogen 10 7 - 30 mg/dL    Creatinine 0.70 0.66 - 1.25 mg/dL    GFR Estimate >90 >60 mL/min/[1.73_m2]    GFR Estimate If Black >90 >60 mL/min/[1.73_m2]    Calcium 8.6 8.5 - 10.1 mg/dL    Bilirubin Total 1.6 (H) 0.2 - 1.3 mg/dL    Albumin 3.8 3.4 - 5.0 g/dL    Protein Total 6.8 6.8 - 8.8 g/dL    Alkaline Phosphatase 75 40 - 150 U/L    ALT 83 (H) 0 - 70 U/L    AST 20 0 - 45 U/L   Magnesium     Status: None   Result Value Ref Range    Magnesium 1.8 1.6 - 2.3 mg/dL   Protein  random urine with Creat Ratio     Status: Abnormal   Result Value Ref Range    Protein Random Urine 0.53 g/L    Protein Total Urine g/gr Creatinine 0.22 (H) 0 - 0.2 g/g  Cr   Calcium ionized whole blood     Status: None   Result Value Ref Range    Calcium Ionized Whole Blood 5.2 4.4 - 5.2 mg/dL   Creatinine urine calculation only     Status: None   Result Value Ref Range    Creatinine Urine 239 mg/dL     Assessment/Plan:  Isael is a 20-year old male with 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 His day 28 studies showed no evidence of disease and his absolute CD19 remains <1. His NGS is pending.    1. ALL and BMT: High risk B cell ALL (CNS negative) + JAK2 activation. Received CAR-T infusion 7/27 after flu/cy conditioning. Maximum CRS grade III requiring fluids, dopamine, Tociluzimab x3, and steroids. Now off steroids without evidence of CRS or neurotoxicity. Day 28 bone marrow biopsy and CNS negative for disease. Bone marrow FISH, chromosomes, and NGS pending.    2. GVHD: No evidence of GVHD.    3. FEN/Renal:  No issues. S/p off TPN.    3. GI: Off protonix and tums now that he is off steroids. He can stop his ursodiol today, his T bili and LFTs continue to improve.    4. ID: Off valtrex, last pentamidine was 8/24. Levofloxacin until ANC >1000. Consider bactrim at the end of next month if no longer neutropenic.     5. Pulmonary: No issues.       6. Cardiovascular: Asymptomatic Aaron-Parkinson-White Syndrome diagnosed upon syncope in 02/2018. Asymptomatic, no interventions to date. Work up EKG c/w WPW, sinus bradycardia at 49 bpm. Work up ECHO normal with EF 68%. Some bradycardia during dopamine infusion while inpatient with CRS. No current concerns.     7. Heme: Transfuse for hgb <7, platelets < 10. Needs premedication with tylenol and benadryl prior to pRBC and platelets. S/p coagulopathy during CRS symptoms while inpatient.     Disposition: Isael had some questions today -  including if he can drive (which he can) and if he can cease wearing an N95 mask (which he can as well but should wear a mask due to COVID). Isael is ready for transfer back to this primary oncologist Dr. Paniagua at [facility]. We will see him again for his day +60 and day +100 assessment. Will reach out to family regarding findings of his NGS.    It is always a pleasure to see Isael and his Mom and we wish them the best and will see them at his day +60 visit.    Patient seen and examined with Pediatric BMT Attending Dr. Zavala.    Edd Jones MD MPH  Pediatric BMT Fellow    Viky Zavala MD, PhD    Pediatric Blood and Marrow Transplant  [facility]    I, Viky Zavala MD PhD, saw this patient with the fellow and agree with the fellow s findings and plan of care as documented in the fellow s note.

## 2020-09-04 LAB
BACTERIA SPEC CULT: NORMAL
Lab: NORMAL
SPECIMEN SOURCE: NORMAL

## 2020-09-10 ENCOUNTER — TRANSFERRED RECORDS (OUTPATIENT)
Dept: HEALTH INFORMATION MANAGEMENT | Facility: CLINIC | Age: 20
End: 2020-09-10

## 2020-09-10 LAB — COPATH REPORT: NORMAL

## 2020-09-14 LAB — COPATH REPORT: NORMAL

## 2020-09-17 ENCOUNTER — TRANSFERRED RECORDS (OUTPATIENT)
Dept: HEALTH INFORMATION MANAGEMENT | Facility: CLINIC | Age: 20
End: 2020-09-17

## 2020-09-22 DIAGNOSIS — Z11.59 ENCOUNTER FOR SCREENING FOR OTHER VIRAL DISEASES: ICD-10-CM

## 2020-09-22 PROCEDURE — U0003 INFECTIOUS AGENT DETECTION BY NUCLEIC ACID (DNA OR RNA); SEVERE ACUTE RESPIRATORY SYNDROME CORONAVIRUS 2 (SARS-COV-2) (CORONAVIRUS DISEASE [COVID-19]), AMPLIFIED PROBE TECHNIQUE, MAKING USE OF HIGH THROUGHPUT TECHNOLOGIES AS DESCRIBED BY CMS-2020-01-R: HCPCS | Performed by: FAMILY MEDICINE

## 2020-09-23 LAB
SARS-COV-2 RNA SPEC QL NAA+PROBE: NOT DETECTED
SPECIMEN SOURCE: NORMAL

## 2020-09-24 ENCOUNTER — TELEPHONE (OUTPATIENT)
Dept: PEDIATRIC HEMATOLOGY/ONCOLOGY | Facility: CLINIC | Age: 20
End: 2020-09-24

## 2020-09-25 ENCOUNTER — INFUSION THERAPY VISIT (OUTPATIENT)
Dept: INFUSION THERAPY | Facility: CLINIC | Age: 20
End: 2020-09-25
Attending: PEDIATRICS
Payer: COMMERCIAL

## 2020-09-25 ENCOUNTER — ONCOLOGY VISIT (OUTPATIENT)
Dept: TRANSPLANT | Facility: CLINIC | Age: 20
End: 2020-09-25
Attending: PEDIATRICS
Payer: COMMERCIAL

## 2020-09-25 ENCOUNTER — ANESTHESIA EVENT (OUTPATIENT)
Dept: PEDIATRICS | Facility: CLINIC | Age: 20
End: 2020-09-25
Payer: COMMERCIAL

## 2020-09-25 ENCOUNTER — ONCOLOGY VISIT (OUTPATIENT)
Dept: TRANSPLANT | Facility: CLINIC | Age: 20
End: 2020-09-25
Attending: NURSE PRACTITIONER
Payer: COMMERCIAL

## 2020-09-25 ENCOUNTER — ANESTHESIA (OUTPATIENT)
Dept: PEDIATRICS | Facility: CLINIC | Age: 20
End: 2020-09-25
Payer: COMMERCIAL

## 2020-09-25 ENCOUNTER — HOSPITAL ENCOUNTER (OUTPATIENT)
Facility: CLINIC | Age: 20
Discharge: HOME | End: 2020-09-25
Attending: RADIOLOGY | Admitting: RADIOLOGY
Payer: COMMERCIAL

## 2020-09-25 VITALS
OXYGEN SATURATION: 100 % | HEART RATE: 60 BPM | RESPIRATION RATE: 12 BRPM | WEIGHT: 134.04 LBS | HEIGHT: 69 IN | TEMPERATURE: 98.1 F | BODY MASS INDEX: 19.85 KG/M2 | DIASTOLIC BLOOD PRESSURE: 71 MMHG | SYSTOLIC BLOOD PRESSURE: 90 MMHG

## 2020-09-25 VITALS
RESPIRATION RATE: 16 BRPM | DIASTOLIC BLOOD PRESSURE: 70 MMHG | HEIGHT: 69 IN | OXYGEN SATURATION: 98 % | BODY MASS INDEX: 19.92 KG/M2 | HEART RATE: 52 BPM | TEMPERATURE: 97.7 F | WEIGHT: 134.48 LBS | SYSTOLIC BLOOD PRESSURE: 104 MMHG

## 2020-09-25 DIAGNOSIS — C91.02 ACUTE LYMPHOBLASTIC LEUKEMIA (ALL) IN RELAPSE (H): ICD-10-CM

## 2020-09-25 DIAGNOSIS — C91.01 ACUTE LYMPHOBLASTIC LEUKEMIA (ALL) IN REMISSION (H): ICD-10-CM

## 2020-09-25 DIAGNOSIS — C91.02 ACUTE LYMPHOBLASTIC LEUKEMIA (ALL) IN RELAPSE (H): Primary | ICD-10-CM

## 2020-09-25 DIAGNOSIS — C91.02 ALL (ACUTE LYMPHOID LEUKEMIA) IN RELAPSE (H): Primary | ICD-10-CM

## 2020-09-25 LAB
ANISOCYTOSIS BLD QL SMEAR: SLIGHT
BASOPHILS # BLD AUTO: 0 10E9/L (ref 0–0.2)
BASOPHILS NFR BLD AUTO: 0 %
BURR CELLS BLD QL SMEAR: ABNORMAL
CD19 CELLS # BLD: <1 CELLS/UL (ref 107–698)
CD19 CELLS NFR BLD: <1 % (ref 6–27)
CD3 CELLS # BLD: 266 CELLS/UL (ref 603–2990)
CD3 CELLS NFR BLD: 56 % (ref 49–84)
CD3+CD4+ CELLS # BLD: 163 CELLS/UL (ref 441–2156)
CD3+CD4+ CELLS NFR BLD: 34 % (ref 28–63)
CD3+CD4+ CELLS/CD3+CD8+ CLL BLD: 1.62 % (ref 1.4–2.6)
CD3+CD8+ CELLS # BLD: 101 CELLS/UL (ref 125–1312)
CD3+CD8+ CELLS NFR BLD: 21 % (ref 10–40)
CD3-CD16+CD56+ CELLS # BLD: 201 CELLS/UL (ref 95–640)
CD3-CD16+CD56+ CELLS NFR BLD: 42 % (ref 4–25)
DIFFERENTIAL METHOD BLD: ABNORMAL
EOSINOPHIL # BLD AUTO: 0.5 10E9/L (ref 0–0.7)
EOSINOPHIL NFR BLD AUTO: 12.2 %
ERYTHROCYTE [DISTWIDTH] IN BLOOD BY AUTOMATED COUNT: 16.8 % (ref 10–15)
GLUCOSE CSF-MCNC: 53 MG/DL (ref 40–70)
HCT VFR BLD AUTO: 40 % (ref 40–53)
HGB BLD-MCNC: 13.6 G/DL (ref 13.3–17.7)
IFC SPECIMEN: ABNORMAL
IGG SERPL-MCNC: 727 MG/DL (ref 610–1616)
LYMPHOCYTES # BLD AUTO: 0.3 10E9/L (ref 0.8–5.3)
LYMPHOCYTES NFR BLD AUTO: 6.1 %
MCH RBC QN AUTO: 33.2 PG (ref 26.5–33)
MCHC RBC AUTO-ENTMCNC: 34 G/DL (ref 31.5–36.5)
MCV RBC AUTO: 98 FL (ref 78–100)
MONOCYTES # BLD AUTO: 0.5 10E9/L (ref 0–1.3)
MONOCYTES NFR BLD AUTO: 13 %
NEUTROPHILS # BLD AUTO: 2.8 10E9/L (ref 1.6–8.3)
NEUTROPHILS NFR BLD AUTO: 68.7 %
PLATELET # BLD AUTO: 133 10E9/L (ref 150–450)
PLATELET # BLD EST: ABNORMAL 10*3/UL
POIKILOCYTOSIS BLD QL SMEAR: ABNORMAL
PROT CSF-MCNC: 40 MG/DL (ref 15–60)
RBC # BLD AUTO: 4.1 10E12/L (ref 4.4–5.9)
WBC # BLD AUTO: 4.1 10E9/L (ref 4–11)

## 2020-09-25 PROCEDURE — 86359 T CELLS TOTAL COUNT: CPT | Performed by: PEDIATRICS

## 2020-09-25 PROCEDURE — 88108 CYTOPATH CONCENTRATE TECH: CPT | Performed by: NURSE PRACTITIONER

## 2020-09-25 PROCEDURE — 25000128 H RX IP 250 OP 636: Performed by: NURSE ANESTHETIST, CERTIFIED REGISTERED

## 2020-09-25 PROCEDURE — 40001004 ZZHCL STATISTIC FLOW INT 9-15 ABY TC 88188: Performed by: NURSE PRACTITIONER

## 2020-09-25 PROCEDURE — 86357 NK CELLS TOTAL COUNT: CPT | Performed by: PEDIATRICS

## 2020-09-25 PROCEDURE — 85025 COMPLETE CBC W/AUTO DIFF WBC: CPT | Performed by: NURSE PRACTITIONER

## 2020-09-25 PROCEDURE — 25800030 ZZH RX IP 258 OP 636: Performed by: NURSE ANESTHETIST, CERTIFIED REGISTERED

## 2020-09-25 PROCEDURE — 37000009 ZZH ANESTHESIA TECHNICAL FEE, EACH ADDTL 15 MIN: Performed by: NURSE PRACTITIONER

## 2020-09-25 PROCEDURE — 86355 B CELLS TOTAL COUNT: CPT | Performed by: PEDIATRICS

## 2020-09-25 PROCEDURE — 25000125 ZZHC RX 250: Performed by: NURSE ANESTHETIST, CERTIFIED REGISTERED

## 2020-09-25 PROCEDURE — 40001005 ZZHCL STATISTIC FLOW >15 ABY TC 88189: Performed by: NURSE PRACTITIONER

## 2020-09-25 PROCEDURE — 88184 FLOWCYTOMETRY/ TC 1 MARKER: CPT | Mod: 91 | Performed by: NURSE PRACTITIONER

## 2020-09-25 PROCEDURE — 00000102 ZZHCL STATISTIC CYTO WRIGHT STAIN TC: Performed by: NURSE PRACTITIONER

## 2020-09-25 PROCEDURE — 88271 CYTOGENETICS DNA PROBE: CPT | Performed by: NURSE PRACTITIONER

## 2020-09-25 PROCEDURE — 37000008 ZZH ANESTHESIA TECHNICAL FEE, 1ST 30 MIN: Performed by: NURSE PRACTITIONER

## 2020-09-25 PROCEDURE — 82945 GLUCOSE OTHER FLUID: CPT | Performed by: NURSE PRACTITIONER

## 2020-09-25 PROCEDURE — 40001011 ZZH STATISTIC PRE-PROCEDURE NURSING ASSESSMENT: Performed by: NURSE PRACTITIONER

## 2020-09-25 PROCEDURE — 88264 CHROMOSOME ANALYSIS 20-25: CPT | Performed by: NURSE PRACTITIONER

## 2020-09-25 PROCEDURE — 38222 DX BONE MARROW BX & ASPIR: CPT | Performed by: NURSE PRACTITIONER

## 2020-09-25 PROCEDURE — 88237 TISSUE CULTURE BONE MARROW: CPT | Performed by: NURSE PRACTITIONER

## 2020-09-25 PROCEDURE — 88275 CYTOGENETICS 100-300: CPT | Performed by: NURSE PRACTITIONER

## 2020-09-25 PROCEDURE — 88108 CYTOPATH CONCENTRATE TECH: CPT | Mod: 26 | Performed by: NURSE PRACTITIONER

## 2020-09-25 PROCEDURE — 62270 DX LMBR SPI PNXR: CPT | Performed by: NURSE PRACTITIONER

## 2020-09-25 PROCEDURE — 25000125 ZZHC RX 250: Performed by: NURSE PRACTITIONER

## 2020-09-25 PROCEDURE — 27210134 ZZH KIT BIOPSY BONE MARROW: Performed by: NURSE PRACTITIONER

## 2020-09-25 PROCEDURE — 25000128 H RX IP 250 OP 636

## 2020-09-25 PROCEDURE — 84157 ASSAY OF PROTEIN OTHER: CPT | Performed by: NURSE PRACTITIONER

## 2020-09-25 PROCEDURE — 36591 DRAW BLOOD OFF VENOUS DEVICE: CPT

## 2020-09-25 PROCEDURE — 86360 T CELL ABSOLUTE COUNT/RATIO: CPT | Performed by: PEDIATRICS

## 2020-09-25 PROCEDURE — 88280 CHROMOSOME KARYOTYPE STUDY: CPT | Performed by: NURSE PRACTITIONER

## 2020-09-25 PROCEDURE — 82784 ASSAY IGA/IGD/IGG/IGM EACH: CPT | Performed by: PEDIATRICS

## 2020-09-25 PROCEDURE — 40000165 ZZH STATISTIC POST-PROCEDURE RECOVERY CARE: Performed by: NURSE PRACTITIONER

## 2020-09-25 PROCEDURE — 88185 FLOWCYTOMETRY/TC ADD-ON: CPT | Performed by: NURSE PRACTITIONER

## 2020-09-25 PROCEDURE — 89050 BODY FLUID CELL COUNT: CPT | Performed by: NURSE PRACTITIONER

## 2020-09-25 RX ORDER — SODIUM CHLORIDE, SODIUM LACTATE, POTASSIUM CHLORIDE, CALCIUM CHLORIDE 600; 310; 30; 20 MG/100ML; MG/100ML; MG/100ML; MG/100ML
INJECTION, SOLUTION INTRAVENOUS CONTINUOUS PRN
Status: DISCONTINUED | OUTPATIENT
Start: 2020-09-25 | End: 2020-09-25

## 2020-09-25 RX ORDER — FENTANYL CITRATE 50 UG/ML
INJECTION, SOLUTION INTRAMUSCULAR; INTRAVENOUS PRN
Status: DISCONTINUED | OUTPATIENT
Start: 2020-09-25 | End: 2020-09-25

## 2020-09-25 RX ORDER — PROPOFOL 10 MG/ML
INJECTION, EMULSION INTRAVENOUS CONTINUOUS PRN
Status: DISCONTINUED | OUTPATIENT
Start: 2020-09-25 | End: 2020-09-25

## 2020-09-25 RX ORDER — LIDOCAINE 40 MG/G
CREAM TOPICAL
Status: DISCONTINUED | OUTPATIENT
Start: 2020-09-25 | End: 2020-09-25 | Stop reason: HOSPADM

## 2020-09-25 RX ORDER — ONDANSETRON 2 MG/ML
INJECTION INTRAMUSCULAR; INTRAVENOUS PRN
Status: DISCONTINUED | OUTPATIENT
Start: 2020-09-25 | End: 2020-09-25

## 2020-09-25 RX ORDER — HEPARIN SODIUM,PORCINE 10 UNIT/ML
5 VIAL (ML) INTRAVENOUS ONCE
Status: DISCONTINUED | OUTPATIENT
Start: 2020-09-25 | End: 2020-09-25 | Stop reason: HOSPADM

## 2020-09-25 RX ORDER — PROPOFOL 10 MG/ML
INJECTION, EMULSION INTRAVENOUS PRN
Status: DISCONTINUED | OUTPATIENT
Start: 2020-09-25 | End: 2020-09-25

## 2020-09-25 RX ORDER — EPHEDRINE SULFATE 50 MG/ML
INJECTION, SOLUTION INTRAMUSCULAR; INTRAVENOUS; SUBCUTANEOUS PRN
Status: DISCONTINUED | OUTPATIENT
Start: 2020-09-25 | End: 2020-09-25

## 2020-09-25 RX ORDER — HEPARIN SODIUM,PORCINE 10 UNIT/ML
VIAL (ML) INTRAVENOUS
Status: DISCONTINUED
Start: 2020-09-25 | End: 2020-09-25 | Stop reason: HOSPADM

## 2020-09-25 RX ORDER — HEPARIN SODIUM (PORCINE) LOCK FLUSH IV SOLN 100 UNIT/ML 100 UNIT/ML
SOLUTION INTRAVENOUS
Status: COMPLETED
Start: 2020-09-25 | End: 2020-09-25

## 2020-09-25 RX ADMIN — Medication 7.5 MG: at 11:35

## 2020-09-25 RX ADMIN — FENTANYL CITRATE 25 MCG: 50 INJECTION, SOLUTION INTRAMUSCULAR; INTRAVENOUS at 11:18

## 2020-09-25 RX ADMIN — SODIUM CHLORIDE, POTASSIUM CHLORIDE, SODIUM LACTATE AND CALCIUM CHLORIDE: 600; 310; 30; 20 INJECTION, SOLUTION INTRAVENOUS at 11:14

## 2020-09-25 RX ADMIN — PROPOFOL 300 MCG/KG/MIN: 10 INJECTION, EMULSION INTRAVENOUS at 11:16

## 2020-09-25 RX ADMIN — FENTANYL CITRATE 25 MCG: 50 INJECTION, SOLUTION INTRAMUSCULAR; INTRAVENOUS at 11:25

## 2020-09-25 RX ADMIN — PROPOFOL 100 MG: 10 INJECTION, EMULSION INTRAVENOUS at 11:14

## 2020-09-25 RX ADMIN — HEPARIN 500 UNITS: 100 SYRINGE at 12:36

## 2020-09-25 RX ADMIN — PROPOFOL 30 MG: 10 INJECTION, EMULSION INTRAVENOUS at 11:25

## 2020-09-25 RX ADMIN — ONDANSETRON 4 MG: 2 INJECTION INTRAMUSCULAR; INTRAVENOUS at 11:35

## 2020-09-25 RX ADMIN — PROPOFOL 30 MG: 10 INJECTION, EMULSION INTRAVENOUS at 11:22

## 2020-09-25 ASSESSMENT — MIFFLIN-ST. JEOR
SCORE: 1596.76
SCORE: 1601.87

## 2020-09-25 ASSESSMENT — PAIN SCALES - GENERAL: PAINLEVEL: NO PAIN (0)

## 2020-09-25 NOTE — ANESTHESIA PREPROCEDURE EVALUATION
Anesthesia Pre-Procedure Evaluation    Patient: Artemio Nino   MRN:     [id number] Gender:   male   Age:    19 year old :      2000        Preoperative Diagnosis: ALL (acute lymphoblastic leukemia) (H) [C91.00]   Procedure(s):  NON -APHERESIS Double lumen tunneled central burns line placement     LABS:  CBC:   Lab Results   Component Value Date    WBC 4.1 2020    WBC 0.9 (LL) 2020    HGB 13.6 2020    HGB 10.1 (L) 2020    HCT 40.0 2020    HCT 30.5 (L) 2020     (L) 2020    PLT 61 (L) 2020     BMP:   Lab Results   Component Value Date     2020     2020    POTASSIUM 3.8 2020    POTASSIUM 3.5 2020    CHLORIDE 109 2020    CHLORIDE 107 2020    CO2 26 2020    CO2 29 2020    BUN 10 2020    BUN 16 2020    CR 0.70 2020    CR 0.72 2020     (H) 2020     (H) 2020     COAGS:   Lab Results   Component Value Date    PTT 58 (H) 2020    INR 1.13 2020    FIBR 187 (L) 2020     POC:   Lab Results   Component Value Date    BGM 98 2020     OTHER:   Lab Results   Component Value Date    ELSA 8.6 2020    PHOS 4.2 2020    MAG 1.8 2020    ALBUMIN 3.8 2020    PROTTOTAL 6.8 2020    ALT 83 (H) 2020    AST 20 2020     (H) 2020    ALKPHOS 75 2020    BILITOTAL 1.6 (H) 2020    TSH 4.20 (H) 2019    T4 0.88 2019    CRP <2.9 2020        Preop Vitals    BP Readings from Last 3 Encounters:   20 105/71   20 104/70   20 108/71    Pulse Readings from Last 3 Encounters:   20 (!) 48   20 52   20 111      Resp Readings from Last 3 Encounters:   20 16   20 16   20 16    SpO2 Readings from Last 3 Encounters:   20 97%   20 98%   20 99%      Temp Readings from Last 1 Encounters:   20 36.5  C (97.7  F)  "(Oral)    Ht Readings from Last 1 Encounters:   09/25/20 1.75 m (5' 8.9\")      Wt Readings from Last 1 Encounters:   09/25/20 60.8 kg (134 lb 0.6 oz)    Estimated body mass index is 19.81 kg/m  as calculated from the following:    Height as of an earlier encounter on 9/25/20: 1.755 m (5' 9.09\").    Weight as of an earlier encounter on 9/25/20: 61 kg (134 lb 7.7 oz).     LDA:  Port A Cath Single 06/10/20 Right Chest wall (Active)   Access Date 09/25/20 09/25/20 0800   Access Attempts 1 09/25/20 0800   Gauge 20 gauge;Power noncoring 90 degree bend;3/4 inch 09/25/20 0800   Site Assessment WDL 09/25/20 0800   Line Status Blood return noted;Heparin locked 09/25/20 0800   Extravasation? No 09/25/20 0800   Number of days: 107        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)      History of blood transfusion      WPW (Aaron-Parkinson-White syndrome) 03/2018      Past Surgical History:   Procedure Laterality Date     BONE MARROW BIOPSY, BONE SPECIMEN, NEEDLE/TROCAR Right 11/27/2018    Procedure: bone marrow biopsy;  Surgeon: Jacqueline Fitzgerald NP;  Location: [facility]      BONE MARROW BIOPSY, BONE SPECIMEN, NEEDLE/TROCAR N/A 2/8/2019    Procedure: BIOPSY BONE MARROW;  Surgeon: Lisa Workman NP;  Location: [facility]      BONE MARROW BIOPSY, BONE SPECIMEN, NEEDLE/TROCAR N/A 5/16/2019    Procedure: BIOPSY, BONE MARROW;  Surgeon: Lilia López APRN CNP;  Location: [facility]     BONE MARROW BIOPSY, BONE SPECIMEN, NEEDLE/TROCAR N/A 11/7/2019    Procedure: Bone marrow biopsy;  Surgeon: Jacqueline Shin NP;  Location: [facility]      BONE MARROW BIOPSY, BONE SPECIMEN, NEEDLE/TROCAR Right 6/10/2020    Procedure: BIOPSY, BONE MARROW;  Surgeon: Candido Han PA-C;  Location: [facility]      BONE MARROW BIOPSY, BONE SPECIMEN, NEEDLE/TROCAR N/A 8/24/2020    Procedure: Bone marrow biopsy;  Surgeon: Jacqueline Shin NP;  Location: [facility]      " ESOPHAGOSCOPY, GASTROSCOPY, DUODENOSCOPY (EGD), COMBINED N/A 2/22/2019    Procedure: Upper endoscopy with biopsy;  Surgeon: Magdalene Hobson MD;  Location: [facility]      INSERT CATHETER VASCULAR ACCESS N/A 7/20/2020    Procedure: NON -APHERESIS Double lumen tunneled central burns line placement;  Surgeon: Pato Gómez PA-C;  Location: [facility]      INSERT CATHETER VASCULAR ACCESS APHERESIS CHILD N/A 6/10/2020    Procedure: Large Bore Double Lumen NON TUNNELED Apheresis Catheter placement;  Surgeon: Link Rios PA-C;  Location: [facility]      INSERT CATHETER VASCULAR ACCESS DOUBLE LUMEN CHILD N/A 10/26/2018    Procedure: double lumen tunneled line placement;  Surgeon: Rex Valente MD;  Location: [facility]      INSERT PORT VASCULAR ACCESS N/A 6/10/2020    Procedure: Port placement;  Surgeon: Link Rios PA-C;  [facility]: [facility]      IR CHEST PORT PLACEMENT > 5 YRS OF AGE  6/10/2020     IR CVC NON TUNNEL LINE REMOVAL  6/12/2020     IR CVC NON TUNNEL PLACEMENT  6/10/2020     IR CVC TUNNEL PLACEMENT > 5 YRS OF AGE  7/20/2020     IR CVC TUNNEL REMOVAL LEFT  2/8/2019     IR CVC TUNNEL REMOVAL LEFT  8/24/2020     IR PORT REMOVAL LEFT  5/16/2019     O CLAVICLE LEFT Left     fracture with surgical repair and plate/screws     ORTHOPEDIC SURGERY      femur     REMOVE CATHETER VASCULAR ACCESS N/A 2/8/2019    Procedure: Tunneled line removal;  Surgeon: Krystal Esparza MD;  [facility]: [facility]      REMOVE CATHETER VASCULAR ACCESS N/A 8/24/2020    Procedure: tunneled line removal;  Surgeon: Siri Hernandez PA-C;  Location: [facility]      REMOVE PORT VASCULAR ACCESS N/A 5/16/2019    Procedure: REMOVAL, VASCULAR ACCESS PORT;  Surgeon: Link Rios PA-C;  Location: [facility]      Allergies   Allergen Reactions     Blood Transfusion Related (Informational Only) Other (See Comments)     Patient has a history of a clinically  significant antibody against RBC antigens.  A delay in compatible RBCs may occur.Stem cell transplant patient.  Give type O RBCs.  Requires Benadryl and tylenol as premed for platelets and RBCs for hx of hives     Voriconazole Photosensitivity     Significant rash - do not rechallenge        Anesthesia Evaluation    ROS/Med Hx    No history of anesthetic complications    Cardiovascular Findings   Comments: WPW 2/18 - one episode of syncope--patient denies was due to WPW    ziopath 10/18:  Cannot rule out pre-excitation at higher HR.    CONCLUSIONS------7/15/2020  Patient after bone marrow transplant. Normal echocardiogram. The left and  right ventricles have normal chamber size, wall thickness, and systolic  function. The calculated biplane left ventricular ejection fraction is 62 %.  No pericardial effusion.      Neuro Findings - negative ROS    Pulmonary Findings - negative ROS  (-) asthma          GI/Hepatic/Renal Findings   (+) GERD    Endocrine/Metabolic Findings - negative ROS                  PHYSICAL EXAM:   Mental Status/Neuro: A/A/O   Airway: Facies: Feasible  Mallampati: II  Mouth/Opening: Full  TM distance: > 6 cm  Neck ROM: Full   Respiratory: Auscultation: CTAB     Resp. Rate: Normal     Resp. Effort: Normal      CV: Rhythm: Regular  Rate: Age appropriate  Heart: Normal Sounds  Edema: None   Comments:      Dental: Normal Dentition                Assessment:   ASA SCORE: 3    H&P: History and physical reviewed and following examination; no interval change.   Smoking Status:  Non-Smoker/Unknown   NPO Status: NPO Appropriate     Plan:   Anes. Type:  MAC   Pre-Medication: None   Induction:  IV (Standard)   Airway: Native Airway   Access/Monitoring: PIV   Maintenance: Propofol Sedation     Postop Plan:   Postop Pain: None  Postop Sedation/Airway: Not planned  Disposition: Outpatient     PONV Management:   Adult Risk Factors:, Non-Smoker   Prevention: Ondansetron, Propofol     CONSENT: Direct conversation    Plan and risks discussed with: Patient; Father   Blood Products: Consent Deferred (Minimal Blood Loss)       Comments for Plan/Consent:  MAC, sedation, GA as back up, standard ASA monitors  All pertinent results and records reviewed, risks, included but not limited to hypoventilation, hypoxemia, laryngo/bronchospasm, N/V, intraoperative awareness due to sedation only d/w patient, all questions, concerns addressed                 Jimena Beckman MD

## 2020-09-25 NOTE — LETTER
9/25/2020      RE: Artemio Nino  [address] [facility] [address]       09/25/20     Dear Dr. Paniagua,    We had the pleasure of seeing Artemio Nino in our Pediatric Blood and Marrow Transplant Clinic today. 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 His day 28 studies showed no evidence of disease, absolute CD19 <1, and negative NGS.  However unfortunately today he was found to be in relapse.      Isael reports that he continues to feel well at home. He reports that his appetite is back to normal. He is driving. He is active in tennis and signed up for soccer next month. Denies fever, illnesses, cough, cold, runny nose. He is having good bowel movements without constipation or diarrhea. Staying hydrated. Sleeping well. Some dry skin on the forehead, uses CeraVe. No concerns today.    Review of Systems: Pertinent positives include those mentioned in interval events. A complete review of systems was performed and is otherwise negative.       Family History: Unchanged from previous visit    Social History: Mom is in [place], [place] with her parents. Isael lives with his grandparents in [place]. The plan remains for Isael to move to [place] with his Mom after the [place] border [place] and his cleared from a transplant/Onc perspective. He recently joined a soccer team which is 7 v 7 so they rotate positions. He is best as a mid-fielder. His favorite soccer team is [employer].     Medications:  1. Bactrim     Physical Exam:  T 97.7 HR 48 /71 RR 16 SpO2 97% on RA Weight 60.8 kg   Karnofsky is 100  GEN: Sitting up on examining table. Talkative with examiner. Thin adolescent male. No acute distress. In good spirits  HEENT: NC/AT, full head of hair.  LISA, EOMI. MMM. No oral ulcers. Dry skin to the forehead.  LYMPH: No adenopathy, supple neck.  CARD: Regular rate and rhythm. Heart sounds dual, no murmurs, rubs or gallops. Cap refill <2 seconds.  RESP: Clear on auscultation, good bilateral air entry. No increased work of breathing, crackles or wheezes.   ABD: Non-distended, non-tender, no organomegaly  SKIN: Dermatitis to the forehead. No erythema.  NEURO: Grossly intact, normal gait. Good strength and sensation.   EXTREM: WWP     Labs:    Results for orders placed or performed during the hospital encounter of 09/25/20   CBC with platelets differential     Status: Abnormal   Result Value Ref Range    WBC 4.1 4.0 - 11.0 10e9/L    RBC Count 4.10 (L) 4.4 - 5.9 10e12/L    Hemoglobin 13.6 13.3 - 17.7 g/dL    Hematocrit 40.0 40.0 - 53.0 %    MCV 98 78 - 100 fl    MCH 33.2 (H) 26.5 - 33.0 pg    MCHC 34.0 31.5 - 36.5 g/dL    RDW 16.8 (H) 10.0 - 15.0 %    Platelet Count 133 (L) 150 - 450 10e9/L    Diff Method Manual Differential     % Neutrophils 68.7 %    % Lymphocytes 6.1 %    % Monocytes 13.0 %    % Eosinophils 12.2 %    % Basophils 0.0 %    Absolute Neutrophil 2.8 1.6 - 8.3 10e9/L    Absolute Lymphocytes 0.3 (L) 0.8 - 5.3 10e9/L    Absolute Monocytes 0.5 0.0 - 1.3 10e9/L    Absolute Eosinophils 0.5 0.0 - 0.7 10e9/L    Absolute Basophils 0.0 0.0 - 0.2 10e9/L    Anisocytosis Slight     Poikilocytosis Moderate     [place] Cells Moderate     Platelet Estimate Decreased    Leukemia Lymphoma Evaluation Non CSF     Status: None   Result Value Ref Range    Copath Report       Patient Name: GLEN HAMMER  MR#: [id number]  Specimen #: [id number]  Collected: 9/25/2020 11:26  Received: 9/25/2020 12:38  Reported: 9/25/2020 17:09  Ordering Phy(s): ELZBIETA MILES    For improved result formatting, select 'View Enhanced Report Format' under   Linked Documents section.  _________________________________________    SPECIMEN(S):  Bone marrow, right    INTERPRETATION:  Bone marrow,  right:    Abnormal B lymphoblast population (9%)    COMMENT:  These findings are consistent with persistent/recurrent involvement by B   acute lymphoblastic  leukemia/lymphoma. Final interpretation requires correlation with results   of other ancillary studies,  morphologic, and clinical features.    RESULTS:  Percentages reported below are based on the total number of CD45 positive   viable leukocytes. If applicable,  percentage of plasma cells is from total viable nucleated cells.    9% abnormal B-lymphoblast population with the following immunophenotype:  Positive for CD10, CD15 (predo minantly-negative), CD19 (dim), CD20   (dim-to-negative), CD22, CD24, CD34, CD38,  CD45 (dim), CD58, and variable CD79a.  Negative for CD5, CD33, and kappa and lambda light chains.    Resident/Fellow Review by:  Dr. Evelyn Redding    A resident/fellow in an ACGME accredited training program was involved in   the selection of testing, review of  flow scattergrams, and/or interpretation of this case. I, as the senior   physician, attest that I: (i)  confirmed appropriate testing, (ii) examined the relevant flow   scattergrams for the specimen(s); and (ii)  rendered or confirmed the interpretation(s).    ANTIBODIES:  Multi-color flow analysis is performed for the following markers: CD5,   CD10, CD15, CD19, CD20, CD22, CD24,  CD33, CD34, CD38, CD45, CD58, CD64, cytoplasmic CD79a, and kappa and   lambda immunoglobulin light chains. Cells  are gated to isolate populations (CD45 versus side scatter and forward   scatter versus side scatter), to  exclude debris (forward scatter versus side  scatter) and to exclude cell   doublets (forward scatter height  versus forward scatter width and side scatter height versus side scatter   width). Forward scatter varies with  cell size. Side scatter varies with the amount of cytoplasmic granules.   Intensity for CD45 usually increases  as hematolymphoid cells mature. The analytic sensitivity of  this assay to   detect abnormal B-lymphoblasts as  rare flow events is 0.01%. This level of sensitivity may not apply to   specimens with few cells such as  cerebrospinal fluid (CSF) specimens.    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    I have personally reviewed all specimens and/or slides, including the   listed special stains, and used them  with my medical judgment to determine the final diagnosis.    Electronically signed out by:  Kevin Richards MD    This test was developed and its performance characteristics determined by   [facility]. It has not been cleared or   approved by the US Food and Drug  Administration.  FDA does not require this test to go through premarket   FDA review. This test is used for  clinical purposes and should not be regarded as investigational or for   research.  This laboratory is certified  under the Clinical Laboratory Improvement Amendments (CLIA) as qualified   to perform high complexity clinical  laboratory testing.    CPT Codes:  A: 91610-VF, 25887-10-DFKW83(15), 81076-EDMG>15    TESTING LAB LOCATION:  [address] [address] [phone]    COLLECTION SITE:  Client:  [facility]  Location:  [facility] ()     Send outs misc test     Status: None (In process)   Result Value Ref Range    Test Name CLONOSEQ      Send Outs Misc Test Code None     Send Outs Misc Test Specimen Bone marrow     Location Performed [employer]      Result PENDING    Glucose CSF: Tube 1     Status: None   Result Value Ref Range    Glucose CSF 53 40 - 70 mg/dL   Protein total CSF: Tube 1     Status: None   Result Value Ref Range    Protein Total CSF 40 15 - 60 mg/dL   Cell count with differential CSF: Tube 1     Status: Abnormal   Result  Value Ref Range    WBC CSF 1 0 - 5 /uL    RBC CSF 11 (H) 0 - 2 /uL    Tube Number 2 #    Color CSF Colorless CLRL^Colorless    Appearance CSF Clear CLER^Clear   Results for orders placed or performed in visit on 09/25/20   T-cell subset extended profile     Status: Abnormal   Result Value Ref Range    IFC Specimen Blood     CD3 Mature T 56 49 - 84 %    CD4 [place] T 34 28 - 63 %    CD8 Suppressor T 21 10 - 40 %    CD19 B Cells <1 (L) 6 - 27 %    CD16 + 56 Natural Killer Cells 42 (H) 4 - 25 %    CD4:CD8 Ratio 1.62 1.40 - 2.60    Absolute CD3 266 (L) 603 - 2,990 cells/uL    Absolute CD4 163 (L) 441 - 2,156 cells/uL    Absolute CD8 101 (L) 125 - 1,312 cells/uL    Absolute CD19 <1 (L) 107 - 698 cells/uL    Absolute CD16+56 201 95 - 640 cells/uL   IgG     Status: None   Result Value Ref Range     610 - 1,616 mg/dL         Assessment/Plan:  Isael is a 20-year old male with 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 His day 28 studies showed no evidence of disease, absolute CD19 <1, and negative NGS. Unfortunately he relapsed today with 9% of abnormal blasts in his bone marrow.  I discussed his case with you, Dr. Paniagua.  I let Isael know that he should call the clinic on Monday to determine the plan.  We felt this was a safe plan as his counts are currently normal and he has no blasts in his peripheral blood.  Isael was on board with the plan and will let his family know.      1. ALL and BMT: High risk B cell ALL (CNS negative) + JAK2 activation. Received CAR-T infusion 7/27 after flu/cy conditioning. Maximum CRS grade III requiring fluids, dopamine, Tociluzimab x3, and steroids. Now off steroids without evidence of CRS or neurotoxicity. Day 28 bone marrow biopsy and CNS negative for disease. Bone marrow  FISH, chromosomes, and NGS negative. Relapse today with CD19 dim, CD22 positive disease at 9%.  CNS pending.  B cells are less than one.   2. GVHD: No evidence of GVHD.    3. FEN/Renal:  No issues. S/p off TPN.    3. GI: Off protonix and tums now that he is off steroids. S/p ursodiol, his T bili and LFTs continue to improve.    4. ID: Off valtrex, last pentamidine was 8/24. S/p Levofloxacin. Continue bactrim.     5. Pulmonary: No issues.       6. Cardiovascular: Asymptomatic Aaron-Parkinson-White Syndrome diagnosed upon syncope in 02/2018. Asymptomatic, no interventions to date. Work up EKG c/w WPW, sinus bradycardia at 49 bpm. Work up ECHO normal with EF 68%. Some bradycardia during dopamine infusion while inpatient with CRS. No current concerns.     7. Heme: Transfuse for hgb <7, platelets < 10. Needs premedication with tylenol and benadryl prior to pRBC and platelets. S/p coagulopathy during CRS symptoms while inpatient.     Disposition: We will transition him to you Dr. Paniagua in hopes that we can get him into a remission to be able to undergo another transplant.     Patient seen and examined with Pediatric BMT Attending Dr. Zavala.    Edd Jones MD MPH  Pediatric BMT Fellow    Viky Zavala MD, PhD    Pediatric Blood and Marrow Transplant  [facility]    I, Viky Zavala MD PhD, saw this patient with the fellow and agree with the fellow s findings and plan of care as documented in the fellow s note.              Viky Zavala MD

## 2020-09-25 NOTE — LETTER
9/25/2020      RE: Artemio Nino  [address] [facility] [address]       09/25/20     Dear Dr. Paniauga,    We had the pleasure of seeing Artemio Nino in our Pediatric Blood and Marrow Transplant Clinic today. 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 His day 28 studies showed no evidence of disease, absolute CD19 <1, and negative NGS.  However unfortunately today he was found to be in relapse.      Isael reports that he continues to feel well at home. He reports that his appetite is back to normal. He is driving. He is active in tennis and signed up for soccer next month. Denies fever, illnesses, cough, cold, runny nose. He is having good bowel movements without constipation or diarrhea. Staying hydrated. Sleeping well. Some dry skin on the forehead, uses CeraVe. No concerns today.    Review of Systems: Pertinent positives include those mentioned in interval events. A complete review of systems was performed and is otherwise negative.       Family History: Unchanged from previous visit    Social History: Mom is in [place], [place] with her parents. Isael lives with his grandparents in [place]. The plan remains for Isael to move to [place] with his Mom after the [place] and his cleared from a transplant/Onc perspective. He recently joined a soccer team which is 7 v 7 so they rotate positions. He is best as a mid-fielder. His favorite soccer team is [employer].     Medications:  1. Bactrim     Physical Exam:  T 97.7 HR 48 /71 RR 16 SpO2 97% on RA Weight 60.8 kg   Karnofsky is 100  GEN: Sitting up on examining table. Talkative with examiner. Thin adolescent male. No acute distress. In good spirits  HEENT: NC/AT, full head of hair.  LISA, EOMI. MMM. No oral ulcers. Dry skin to the forehead.  LYMPH: No adenopathy, supple neck.  CARD: Regular rate and rhythm. Heart sounds dual, no murmurs, rubs or gallops. Cap refill <2 seconds.  RESP: Clear on auscultation, good bilateral air entry. No increased work of breathing, crackles or wheezes.   ABD: Non-distended, non-tender, no organomegaly  SKIN: Dermatitis to the forehead. No erythema.  NEURO: Grossly intact, normal gait. Good strength and sensation.   EXTREM: WWP     Labs:    Results for orders placed or performed during the hospital encounter of 09/25/20   CBC with platelets differential     Status: Abnormal   Result Value Ref Range    WBC 4.1 4.0 - 11.0 10e9/L    RBC Count 4.10 (L) 4.4 - 5.9 10e12/L    Hemoglobin 13.6 13.3 - 17.7 g/dL    Hematocrit 40.0 40.0 - 53.0 %    MCV 98 78 - 100 fl    MCH 33.2 (H) 26.5 - 33.0 pg    MCHC 34.0 31.5 - 36.5 g/dL    RDW 16.8 (H) 10.0 - 15.0 %    Platelet Count 133 (L) 150 - 450 10e9/L    Diff Method Manual Differential     % Neutrophils 68.7 %    % Lymphocytes 6.1 %    % Monocytes 13.0 %    % Eosinophils 12.2 %    % Basophils 0.0 %    Absolute Neutrophil 2.8 1.6 - 8.3 10e9/L    Absolute Lymphocytes 0.3 (L) 0.8 - 5.3 10e9/L    Absolute Monocytes 0.5 0.0 - 1.3 10e9/L    Absolute Eosinophils 0.5 0.0 - 0.7 10e9/L    Absolute Basophils 0.0 0.0 - 0.2 10e9/L    Anisocytosis Slight     Poikilocytosis Moderate     [place] Cells Moderate     Platelet Estimate Decreased    Leukemia Lymphoma Evaluation Non CSF     Status: None   Result Value Ref Range    Copath Report       Patient Name: GLEN HAMMER  MR#: [id number]  Specimen #: [id number]  Collected: 9/25/2020 11:26  Received: 9/25/2020 12:38  Reported: 9/25/2020 17:09  Ordering Phy(s): ELZBIETA MILES    For improved result formatting, select 'View Enhanced Report Format' under   Linked Documents section.  _________________________________________    SPECIMEN(S):  Bone marrow, right    INTERPRETATION:  Bone marrow,  right:    Abnormal B lymphoblast population (9%)    COMMENT:  These findings are consistent with persistent/recurrent involvement by B   acute lymphoblastic  leukemia/lymphoma. Final interpretation requires correlation with results   of other ancillary studies,  morphologic, and clinical features.    RESULTS:  Percentages reported below are based on the total number of CD45 positive   viable leukocytes. If applicable,  percentage of plasma cells is from total viable nucleated cells.    9% abnormal B-lymphoblast population with the following immunophenotype:  Positive for CD10, CD15 (predo minantly-negative), CD19 (dim), CD20   (dim-to-negative), CD22, CD24, CD34, CD38,  CD45 (dim), CD58, and variable CD79a.  Negative for CD5, CD33, and kappa and lambda light chains.    Resident/Fellow Review by:  Dr. Evelyn Redding    A resident/fellow in an ACGME accredited training program was involved in   the selection of testing, review of  flow scattergrams, and/or interpretation of this case. I, as the senior   physician, attest that I: (i)  confirmed appropriate testing, (ii) examined the relevant flow   scattergrams for the specimen(s); and (ii)  rendered or confirmed the interpretation(s).    ANTIBODIES:  Multi-color flow analysis is performed for the following markers: CD5,   CD10, CD15, CD19, CD20, CD22, CD24,  CD33, CD34, CD38, CD45, CD58, CD64, cytoplasmic CD79a, and kappa and   lambda immunoglobulin light chains. Cells  are gated to isolate populations (CD45 versus side scatter and forward   scatter versus side scatter), to  exclude debris (forward scatter versus side  scatter) and to exclude cell   doublets (forward scatter height  versus forward scatter width and side scatter height versus side scatter   width). Forward scatter varies with  cell size. Side scatter varies with the amount of cytoplasmic granules.   Intensity for CD45 usually increases  as hematolymphoid cells mature. The analytic sensitivity of  this assay to   detect abnormal B-lymphoblasts as  rare flow events is 0.01%. This level of sensitivity may not apply to   specimens with few cells such as  cerebrospinal fluid (CSF) specimens.    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    I have personally reviewed all specimens and/or slides, including the   listed special stains, and used them  with my medical judgment to determine the final diagnosis.    Electronically signed out by:  Kevin Richards MD    This test was developed and its performance characteristics determined by   [facility]. It has not been cleared or   approved by the US Food and Drug  Administration.  FDA does not require this test to go through premarket   FDA review. This test is used for  clinical purposes and should not be regarded as investigational or for   research.  This laboratory is certified  under the Clinical Laboratory Improvement Amendments (CLIA) as qualified   to perform high complexity clinical  laboratory testing.    CPT Codes:  A: 02075-XH, 01209-31-OYBH82(15), 42162-YIVF>15    TESTING LAB LOCATION:  [address] [address] [phone]    COLLECTION SITE:  Client:  [facility]  Location:  [facility] ()     Send outs misc test     Status: None (In process)   Result Value Ref Range    Test Name CLONOSEQ      Send Outs Misc Test Code None     Send Outs Misc Test Specimen Bone marrow     Location Performed [employer]      Result PENDING    Glucose CSF: Tube 1     Status: None   Result Value Ref Range    Glucose CSF 53 40 - 70 mg/dL   Protein total CSF: Tube 1     Status: None   Result Value Ref Range    Protein Total CSF 40 15 - 60 mg/dL   Cell count with differential CSF: Tube 1     Status: Abnormal   Result  Value Ref Range    WBC CSF 1 0 - 5 /uL    RBC CSF 11 (H) 0 - 2 /uL    Tube Number 2 #    Color CSF Colorless CLRL^Colorless    Appearance CSF Clear CLER^Clear   Results for orders placed or performed in visit on 09/25/20   T-cell subset extended profile     Status: Abnormal   Result Value Ref Range    IFC Specimen Blood     CD3 Mature T 56 49 - 84 %    CD4 [place] T 34 28 - 63 %    CD8 Suppressor T 21 10 - 40 %    CD19 B Cells <1 (L) 6 - 27 %    CD16 + 56 Natural Killer Cells 42 (H) 4 - 25 %    CD4:CD8 Ratio 1.62 1.40 - 2.60    Absolute CD3 266 (L) 603 - 2,990 cells/uL    Absolute CD4 163 (L) 441 - 2,156 cells/uL    Absolute CD8 101 (L) 125 - 1,312 cells/uL    Absolute CD19 <1 (L) 107 - 698 cells/uL    Absolute CD16+56 201 95 - 640 cells/uL   IgG     Status: None   Result Value Ref Range     610 - 1,616 mg/dL         Assessment/Plan:  Isael is a 20-year old male with 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 His day 28 studies showed no evidence of disease, absolute CD19 <1, and negative NGS. Unfortunately he relapsed today with 9% of abnormal blasts in his bone marrow.  I discussed his case with you, Dr. Paniagua.  I let Isael know that he should call the clinic on Monday to determine the plan.  We felt this was a safe plan as his counts are currently normal and he has no blasts in his peripheral blood.  Isael was on board with the plan and will let his family know.      1. ALL and BMT: High risk B cell ALL (CNS negative) + JAK2 activation. Received CAR-T infusion 7/27 after flu/cy conditioning. Maximum CRS grade III requiring fluids, dopamine, Tociluzimab x3, and steroids. Now off steroids without evidence of CRS or neurotoxicity. Day 28 bone marrow biopsy and CNS negative for disease. Bone marrow  FISH, chromosomes, and NGS negative. Relapse today with CD19 dim, CD22 positive disease at 9%.  CNS pending.  B cells are less than one.   2. GVHD: No evidence of GVHD.    3. FEN/Renal:  No issues. S/p off TPN.    3. GI: Off protonix and tums now that he is off steroids. S/p ursodiol, his T bili and LFTs continue to improve.    4. ID: Off valtrex, last pentamidine was 8/24. S/p Levofloxacin. Continue bactrim.     5. Pulmonary: No issues.       6. Cardiovascular: Asymptomatic Aaron-Parkinson-White Syndrome diagnosed upon syncope in 02/2018. Asymptomatic, no interventions to date. Work up EKG c/w WPW, sinus bradycardia at 49 bpm. Work up ECHO normal with EF 68%. Some bradycardia during dopamine infusion while inpatient with CRS. No current concerns.     7. Heme: Transfuse for hgb <7, platelets < 10. Needs premedication with tylenol and benadryl prior to pRBC and platelets. S/p coagulopathy during CRS symptoms while inpatient.     Disposition: We will transition him to you Dr. Paniagua in hopes that we can get him into a remission to be able to undergo another transplant.     Patient seen and examined with Pediatric BMT Attending Dr. Zavala.    Edd Jones MD MPH  Pediatric BMT Fellow    Viky Zavala MD, PhD    Pediatric Blood and Marrow Transplant  [facility]    I, Viky Zavala MD PhD, saw this patient with the fellow and agree with the fellow s findings and plan of care as documented in the fellow s note.              Viky Zavala MD

## 2020-09-25 NOTE — PROGRESS NOTES
Please see procedural note from pediatric sedation for pediatric BMT and LP on 9/25/2020.     Lilia Prabhakar MSN, CPNP-AC  Pediatric Blood and Marrow Transplant Program  [facility] [phone]  Pager [phone]

## 2020-09-25 NOTE — PROCEDURES
BMT Bone Marrow Biopsy Procedure Note  September 25, 2020 12:28 PM    DIAGNOSIS: S/p   CAR-T for relapse ALL day +30      PROCEDURE: Unilateral Bone Marrow Biopsy and Unilateral Aspirate    SITE: Pediatric Sedation Suite    Patient s identification was positively verified by verbal identification and invasive procedure safety checklist was completed.  Informed consent was obtained. Following the administration of propofol as sedation, patient was placed in the  left lateral decubitus position and prepped and draped in a sterile manner.  Approximately 2 cc of 1% Lidocaine was used over the right posterior iliac spine.  Following this a 3 mm incision was made. Trephine bone marrow core was obtained from the [facility]. Bone marrow aspirates were obtained from the [facility]. Aspirates were sent for next generation sequence sent to [place], [place], FISH, Leukemia Lymphoma and Chromosomes.  A total of approximately  20 ml of marrow was aspirated.  Following this procedure a sterile dressing was applied to the bone marrow biopsy site. The patient was placed in the supine position to maintain pressure on the biopsy site. Post-procedure wound care instructions were given. The patient tolerated the procedure well with no discomfort.  Complications: None      [facility] Procedure Note          Lumbar Puncture:      Time: 12:39 PM  Performed by: SNOW Rasmussen CNP  Authorized by: SNOW Rasmussen CNP    Indications: S/p CAR-T therapy for his relapse ALL    Consent given by: Patient who states understanding of the procedure being performed after discussing the risks, benefits and alternatives.    Prior to the start of the procedure and with procedural staff participation, I verbally confirmed the patient s identity using two indicators, relevant allergies, that the procedure was appropriate and matched the consent or emergent situation, and that the correct equipment/implants were available. Immediately prior to  starting the procedure I conducted the Time Out with the procedural staff and re-confirmed the patient s name, procedure, and site/side. (The Joint Commission universal protocol was followed.) Yes    Under sterile conditions the patient was positioned L Lateral decubitus with knees drawn up. Chloroprep solution and sterile drapes were utilized.  Local anesthetic at the site: 2 ml of lidocaine 1% without epinephrine from the LP tray  A 21 G  spinal needle was inserted at the L 3-4 interspace.  Opening Pressurewas not checked.  A total of 8mL of clear and colorless spinal fluid was obtained and sent to the laboratory.   After the needle was removed, a bandaid and pressure were applied and the patient was instructed to stay horizontal until the results were back.    Complications:  None    Patient tolerance: Patient tolerated the procedure well with no immediate complications. Post lumbar puncture care instructions were provided to Artemio and his father.       Procedure performed by: Lilia YOUSIF

## 2020-09-25 NOTE — LETTER
9/25/2020         RE: Artemio Nino  [address] [address]      09/25/20     Dear Dr. Paniagua,    We had the pleasure of seeing Artemio Nino in our Pediatric Blood and Marrow Transplant Clinic today. 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 His day 28 studies showed no evidence of disease, absolute CD19 <1, and negative NGS.  However unfortunately today he was found to be in relapse.      Isael reports that he continues to feel well at home. He reports that his appetite is back to normal. He is driving. He is active in tennis and signed up for soccer next month. Denies fever, illnesses, cough, cold, runny nose. He is having good bowel movements without constipation or diarrhea. Staying hydrated. Sleeping well. Some dry skin on the forehead, uses CeraVe. No concerns today.    Review of Systems: Pertinent positives include those mentioned in interval events. A complete review of systems was performed and is otherwise negative.       Family History: Unchanged from previous visit    Social History: Mom is in [place], [place] with her parents. Isael lives with his grandparents in [place]. The plan remains for Isael to move to [place] with his Mom after the [place] and his cleared from a transplant/Onc perspective. He recently joined a soccer team which is 7 v 7 so they rotate positions. He is best as a mid-fielder. His favorite soccer team is [employer].     Medications:  1. Bactrim     Physical Exam:  T 97.7 HR 48 /71 RR 16 SpO2 97% on RA Weight 60.8 kg   Karnofsky is 100  GEN: Sitting up on examining table. Talkative with examiner. Thin adolescent male. No acute distress. In good spirits  HEENT: NC/AT, full head of  hair. LISA, EOMI. MMM. No oral ulcers. Dry skin to the forehead.  LYMPH: No adenopathy, supple neck.  CARD: Regular rate and rhythm. Heart sounds dual, no murmurs, rubs or gallops. Cap refill <2 seconds.  RESP: Clear on auscultation, good bilateral air entry. No increased work of breathing, crackles or wheezes.   ABD: Non-distended, non-tender, no organomegaly  SKIN: Dermatitis to the forehead. No erythema.  NEURO: Grossly intact, normal gait. Good strength and sensation.   EXTREM: WWP     Labs:    Results for orders placed or performed during the hospital encounter of 09/25/20   CBC with platelets differential     Status: Abnormal   Result Value Ref Range    WBC 4.1 4.0 - 11.0 10e9/L    RBC Count 4.10 (L) 4.4 - 5.9 10e12/L    Hemoglobin 13.6 13.3 - 17.7 g/dL    Hematocrit 40.0 40.0 - 53.0 %    MCV 98 78 - 100 fl    MCH 33.2 (H) 26.5 - 33.0 pg    MCHC 34.0 31.5 - 36.5 g/dL    RDW 16.8 (H) 10.0 - 15.0 %    Platelet Count 133 (L) 150 - 450 10e9/L    Diff Method Manual Differential     % Neutrophils 68.7 %    % Lymphocytes 6.1 %    % Monocytes 13.0 %    % Eosinophils 12.2 %    % Basophils 0.0 %    Absolute Neutrophil 2.8 1.6 - 8.3 10e9/L    Absolute Lymphocytes 0.3 (L) 0.8 - 5.3 10e9/L    Absolute Monocytes 0.5 0.0 - 1.3 10e9/L    Absolute Eosinophils 0.5 0.0 - 0.7 10e9/L    Absolute Basophils 0.0 0.0 - 0.2 10e9/L    Anisocytosis Slight     Poikilocytosis Moderate     Independence Cells Moderate     Platelet Estimate Decreased    Leukemia Lymphoma Evaluation Non CSF     Status: None   Result Value Ref Range    Copath Report       Patient Name: GLEN HAMMER  MR#: [id number]  Specimen #: [id number]  Collected: 9/25/2020 11:26  Received: 9/25/2020 12:38  Reported: 9/25/2020 17:09  Ordering Phy(s): ELZBIETA MILES    For improved result formatting, select 'View Enhanced Report Format' under   Linked Documents section.  _________________________________________    SPECIMEN(S):  Bone marrow, right    INTERPRETATION:  Bone  marrow, right:    Abnormal B lymphoblast population (9%)    COMMENT:  These findings are consistent with persistent/recurrent involvement by B   acute lymphoblastic  leukemia/lymphoma. Final interpretation requires correlation with results   of other ancillary studies,  morphologic, and clinical features.    RESULTS:  Percentages reported below are based on the total number of CD45 positive   viable leukocytes. If applicable,  percentage of plasma cells is from total viable nucleated cells.    9% abnormal B-lymphoblast population with the following immunophenotype:  Positive for CD10, CD15 (predo minantly-negative), CD19 (dim), CD20   (dim-to-negative), CD22, CD24, CD34, CD38,  CD45 (dim), CD58, and variable CD79a.  Negative for CD5, CD33, and kappa and lambda light chains.    Resident/Fellow Review by:  Dr. Evelyn Redding    A resident/fellow in an ACGME accredited training program was involved in   the selection of testing, review of  flow scattergrams, and/or interpretation of this case. I, as the senior   physician, attest that I: (i)  confirmed appropriate testing, (ii) examined the relevant flow   scattergrams for the specimen(s); and (ii)  rendered or confirmed the interpretation(s).    ANTIBODIES:  Multi-color flow analysis is performed for the following markers: CD5,   CD10, CD15, CD19, CD20, CD22, CD24,  CD33, CD34, CD38, CD45, CD58, CD64, cytoplasmic CD79a, and kappa and   lambda immunoglobulin light chains. Cells  are gated to isolate populations (CD45 versus side scatter and forward   scatter versus side scatter), to  exclude debris (forward scatter versus side  scatter) and to exclude cell   doublets (forward scatter height  versus forward scatter width and side scatter height versus side scatter   width). Forward scatter varies with  cell size. Side scatter varies with the amount of cytoplasmic granules.   Intensity for CD45 usually increases  as hematolymphoid cells mature. The analytic  sensitivity of this assay to   detect abnormal B-lymphoblasts as  rare flow events is 0.01%. This level of sensitivity may not apply to   specimens with few cells such as  cerebrospinal fluid (CSF) specimens.    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    I have personally reviewed all specimens and/or slides, including the   listed special stains, and used them  with my medical judgment to determine the final diagnosis.    Electronically signed out by:  Kevin Richards MD    This test was developed and its performance characteristics determined by   [facility]. It has not been cleared or   approved by the US Food and Drug  Administration.  FDA does not require this test to go through premarket   FDA review. This test is used for  clinical purposes and should not be regarded as investigational or for   research.  This laboratory is certified  under the Clinical Laboratory Improvement Amendments (CLIA) as qualified   to perform high complexity clinical  laboratory testing.    CPT Codes:  A: 33773-MS, 82485-62-FPEZ08(15), 97001-JFRX>15    TESTING LAB LOCATION:  [address] [id number] [phone]    COLLECTION SITE:  Client:  [facility]  Location:  [facility] (B)     Send outs misc test     Status: None (In process)   Result Value Ref Range    Test Name CLONOSEQ      Send Outs Misc Test Code None     Send Outs Misc Test Specimen Bone marrow     Location Performed [facility]      Result PENDING    Glucose CSF: Tube 1     Status: None   Result Value Ref Range    Glucose CSF 53 40 - 70 mg/dL   Protein total CSF: Tube 1     Status: None   Result Value Ref Range    Protein Total CSF 40 15 - 60 mg/dL   Cell count with differential CSF: Tube 1     Status:  Abnormal   Result Value Ref Range    WBC CSF 1 0 - 5 /uL    RBC CSF 11 (H) 0 - 2 /uL    Tube Number 2 #    Color CSF Colorless CLRL^Colorless    Appearance CSF Clear CLER^Clear   Results for orders placed or performed in visit on 09/25/20   T-cell subset extended profile     Status: Abnormal   Result Value Ref Range    IFC Specimen Blood     CD3 Mature T 56 49 - 84 %    CD4 [place] T 34 28 - 63 %    CD8 Suppressor T 21 10 - 40 %    CD19 B Cells <1 (L) 6 - 27 %    CD16 + 56 Natural Killer Cells 42 (H) 4 - 25 %    CD4:CD8 Ratio 1.62 1.40 - 2.60    Absolute CD3 266 (L) 603 - 2,990 cells/uL    Absolute CD4 163 (L) 441 - 2,156 cells/uL    Absolute CD8 101 (L) 125 - 1,312 cells/uL    Absolute CD19 <1 (L) 107 - 698 cells/uL    Absolute CD16+56 201 95 - 640 cells/uL   IgG     Status: None   Result Value Ref Range     610 - 1,616 mg/dL         Assessment/Plan:  Isael is a 20-year old male with 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 His day 28 studies showed no evidence of disease, absolute CD19 <1, and negative NGS. Unfortunately he relapsed today with 9% of abnormal blasts in his bone marrow.  I discussed his case with you, Dr. Paniagua.  I let Isael know that he should call the clinic on Monday to determine the plan.  We felt this was a safe plan as his counts are currently normal and he has no blasts in his peripheral blood.  Isael was on board with the plan and will let his family know.      1. ALL and BMT: High risk B cell ALL (CNS negative) + JAK2 activation. Received CAR-T infusion 7/27 after flu/cy conditioning. Maximum CRS grade III requiring fluids, dopamine, Tociluzimab x3, and steroids. Now off steroids without evidence of CRS or neurotoxicity. Day 28 bone marrow biopsy and CNS negative for  disease. Bone marrow FISH, chromosomes, and NGS negative. Relapse today with CD19 dim, CD22 positive disease at 9%.  CNS pending.  B cells are less than one.   2. GVHD: No evidence of GVHD.    3. FEN/Renal:  No issues. S/p off TPN.    3. GI: Off protonix and tums now that he is off steroids. S/p ursodiol, his T bili and LFTs continue to improve.    4. ID: Off valtrex, last pentamidine was 8/24. S/p Levofloxacin. Continue bactrim.     5. Pulmonary: No issues.       6. Cardiovascular: Asymptomatic Aaron-Parkinson-White Syndrome diagnosed upon syncope in 02/2018. Asymptomatic, no interventions to date. Work up EKG c/w WPW, sinus bradycardia at 49 bpm. Work up ECHO normal with EF 68%. Some bradycardia during dopamine infusion while inpatient with CRS. No current concerns.     7. Heme: Transfuse for hgb <7, platelets < 10. Needs premedication with tylenol and benadryl prior to pRBC and platelets. S/p coagulopathy during CRS symptoms while inpatient.     Disposition: We will transition him to you Dr. Paniagua in hopes that we can get him into a remission to be able to undergo another transplant.     Patient seen and examined with Pediatric BMT Attending Dr. Zavala.    Edd Jones MD MPH  Pediatric BMT Fellow    Viky Zavala MD, PhD    Pediatric Blood and Marrow Transplant  [facility]    I, Viky Zavala MD PhD, saw this patient with the fellow and agree with the fellow s findings and plan of care as documented in the fellow s note.

## 2020-09-25 NOTE — NURSING NOTE
"Chief Complaint   Patient presents with     RECHECK     Patient here today for Anniversary Visit     /70 (BP Location: Right arm, Patient Position: Sitting, Cuff Size: Adult Regular)   Pulse 52   Temp 97.7  F (36.5  C) (Oral)   Resp 16   Ht 1.755 m (5' 9.09\")   Wt 61 kg (134 lb 7.7 oz)   SpO2 98%   BMI 19.81 kg/m      No Pain (0)  Data Unavailable    I have reviewed the patients medication and allergy list.    Patient needs refills: no    Dressing change needed? No    EKG needed? No    Zhane Forst CMA  September 25, 2020  "

## 2020-09-25 NOTE — DISCHARGE INSTRUCTIONS
Home Instructions for Your Child after Sedation  Today your child received (medicine):  Propofol, Fentanyl and Zofran  Please keep this form with your health records  Your child may be more sleepy and irritable today than normal. Wake your child up every 1 to 11/2 hours during the day. (This way, both you and your child will sleep through the night.) Also, an adult should stay with your child for the rest of the day. The medicine may make the child dizzy. Avoid activities that require balance (bike riding, skating, climbing stairs, walking).  Remember:    For young infants: Do not allow the car seat or infant seat to bend the child's head forward and down. If it does, your child may not be able to breathe.    When your child wants to eat again, start with liquids (juice, soda pop, Popsicles). If your child feels well enough, you may try a regular diet. It is best to offer light meals for the first 24 hours.    If your child has nausea (feels sick to the stomach) or vomiting (throws up), give small amounts of clear liquids (7-Up, Sprite, apple juice or broth). Fluids are more important than food until your child is feeling better.    Wait 24 hours before giving medicine that contains alcohol. This includes liquid cold, cough and allergy medicines (Robitussin, Vicks Formula 44 for children, Benadryl, Chlor-Trimeton).    If you will leave your child with a , give the sitter a copy of these instructions.  Call your doctor if:    You have questions about the test results.    Your child vomits (throws up) more than two times.    Your child is very fussy or irritable.    You have trouble waking your child.     If your child has trouble breathing, call [phone].  If you have any questions or concerns, please call:  Pediatric Sedation Unit [phone]  Pediatric clinic  [phone]  [facility]  [phone] (ask for the doctor on call)  Emergency department [phone]  [facility] toll-free  number [phone] (Monday--Friday, 8 a.m. to 4:30 p.m.)  I understand these instructions. I have all of my personal belongings.    [facility]   [phone]    Care post Lumbar Puncture     Do not remove bandage/dressing for 24 hours -- after this time they can be removed    No bath, shower or soaking of the dressing for 24 hours    Activity as tolerated by the patient    Diet as able to tolerated    May use Tylenol as needed for pain control -- DO NOT use Ibuprofen    Can apply icepack to the site for discomfort -- no more than 10 minutes at a time    If bleeding presents apply pressure for 5 minutes    Call [phone] ask for Peds BMT/Hem/Onc fellow on call if complications arise including:    persistent bleeding    fever greater than 100.5    Pain    Lumbar punctures can cause headache. If the pain is not controlled with Tylenol (acetaminophen) please call the Peds BMT/Hem/Onc fellow on call    [facility]  [phone]    Care for Bone Marrow Biopsy    Do not remove bandage/dressing for 24 hours -- after this time they can be removed. If Steri-strips are presents they can stay on until they fall off    No bath, shower or soaking of the dressing for 24 hours    Activity as tolerated by the patient    Diet as able to tolerate    May use Tylenol as needed for pain control    Can apply icepack to the site for discomfort -- no more than 10 minutes at a time    If bleeding presents, apply pressure for 5 minutes    Call [phone] ask for Peds BMT/Hem/Onc fellow on call if complications arise including:     persistent bleeding    fever greater than 100.5    pain

## 2020-09-25 NOTE — PATIENT INSTRUCTIONS
Return to [facility] for day +100 CAR-T BAN appointment on November 11th and 13th as already scheduled    Infusion needs: none    Patient has no line, to be drawn off of per lab.     Medication changes: none    Care plan changes: none    Contact information  During business hours (7:30am-4:30pm):   To leave a non-urgent voicemail: call triage line [phone]    To call for time-sensitive needs or concerns : call clinic  [phone]    Evenings after 4:30pm, weekends, and holidays:   For any needs or concerns: call for BMT fellow at [phone] in the case of an emergency    Thank you!     Appointment already scheduled as of 9/28 at 911am MILL[facility]

## 2020-09-25 NOTE — ANESTHESIA POSTPROCEDURE EVALUATION
Anesthesia POST Procedure Evaluation    Patient: Artemio Nino   MRN:     [id number] Gender:   male   Age:    20 year old :      2000        Preoperative Diagnosis: ALL (acute lymphoblastic leukemia) (H) [C91.00]   Procedure(s):  BIOPSY, BONE MARROW  LUMBAR PUNCTURE, DIAGNOSTIC   Postop Comments: No value filed.     Anesthesia Type: MAC       Disposition: Outpatient   Postop Pain Control: Uneventful            Sign Out: Well controlled pain   PONV: No   Neuro/Psych: Uneventful            Sign Out: Acceptable/Baseline neuro status   Airway/Respiratory: Uneventful            Sign Out: Acceptable/Baseline resp. status   CV/Hemodynamics: Uneventful            Sign Out: Acceptable CV status   Other NRE: NONE   DID A NON-ROUTINE EVENT OCCUR? No         Last Anesthesia Record Vitals:  CRNA VITALS  2020 1115 - 2020 1215      2020             Resp Rate (observed):  18          Last PACU Vitals:  Vitals Value Taken Time   BP 82/47 2020 12:00 PM   Temp 36.7  C (98.1  F) 2020 12:00 PM   Pulse     Resp 12 2020 12:00 PM   SpO2 96 % 2020 12:00 PM   Temp src     NIBP     Pulse     SpO2     Resp     Temp     Ht Rate     Temp 2           Electronically Signed By: Jimena Beckman MD, 2020, 12:57 PM

## 2020-09-25 NOTE — PROGRESS NOTES
09/25/20     Dear Dr. Paniagua,    We had the pleasure of seeing Artemio Nino in our Pediatric Blood and Marrow Transplant Clinic today. 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 His day 28 studies showed no evidence of disease, absolute CD19 <1, and negative NGS.  However unfortunately today he was found to be in relapse.      Isael reports that he continues to feel well at home. He reports that his appetite is back to normal. He is driving. He is active in tennis and signed up for soccer next month. Denies fever, illnesses, cough, cold, runny nose. He is having good bowel movements without constipation or diarrhea. Staying hydrated. Sleeping well. Some dry skin on the forehead, uses CeraVe. No concerns today.    Review of Systems: Pertinent positives include those mentioned in interval events. A complete review of systems was performed and is otherwise negative.       Family History: Unchanged from previous visit    Social History: Mom is in [place], [place] with her parents. Isael lives with his grandparents in [place]. The plan remains for Isael to move to [place] with his Mom after the [place] and his cleared from a transplant/Onc perspective. He recently joined a soccer team which is 7 v 7 so they rotate positions. He is best as a mid-fielder. His favorite soccer team is [employer].     Medications:  1. Bactrim     Physical Exam:  T 97.7 HR 48 /71 RR 16 SpO2 97% on RA Weight 60.8 kg   Karnofsky is 100  GEN: Sitting up on examining table. Talkative with examiner. Thin adolescent male. No acute distress. In good spirits  HEENT: NC/AT, full head of hair. LISA, EOMI. MMM. No oral ulcers. Dry skin to the forehead.  LYMPH: No adenopathy, supple  neck.  CARD: Regular rate and rhythm. Heart sounds dual, no murmurs, rubs or gallops. Cap refill <2 seconds.  RESP: Clear on auscultation, good bilateral air entry. No increased work of breathing, crackles or wheezes.   ABD: Non-distended, non-tender, no organomegaly  SKIN: Dermatitis to the forehead. No erythema.  NEURO: Grossly intact, normal gait. Good strength and sensation.   EXTREM: WWP     Labs:    Results for orders placed or performed during the hospital encounter of 09/25/20   CBC with platelets differential     Status: Abnormal   Result Value Ref Range    WBC 4.1 4.0 - 11.0 10e9/L    RBC Count 4.10 (L) 4.4 - 5.9 10e12/L    Hemoglobin 13.6 13.3 - 17.7 g/dL    Hematocrit 40.0 40.0 - 53.0 %    MCV 98 78 - 100 fl    MCH 33.2 (H) 26.5 - 33.0 pg    MCHC 34.0 31.5 - 36.5 g/dL    RDW 16.8 (H) 10.0 - 15.0 %    Platelet Count 133 (L) 150 - 450 10e9/L    Diff Method Manual Differential     % Neutrophils 68.7 %    % Lymphocytes 6.1 %    % Monocytes 13.0 %    % Eosinophils 12.2 %    % Basophils 0.0 %    Absolute Neutrophil 2.8 1.6 - 8.3 10e9/L    Absolute Lymphocytes 0.3 (L) 0.8 - 5.3 10e9/L    Absolute Monocytes 0.5 0.0 - 1.3 10e9/L    Absolute Eosinophils 0.5 0.0 - 0.7 10e9/L    Absolute Basophils 0.0 0.0 - 0.2 10e9/L    Anisocytosis Slight     Poikilocytosis Moderate     Vijaya Cells Moderate     Platelet Estimate Decreased    Leukemia Lymphoma Evaluation Non CSF     Status: None   Result Value Ref Range    Copath Report       Patient Name: GLEN HAMMER  MR#: [id number]  Specimen #: [id number]  Collected: 9/25/2020 11:26  Received: 9/25/2020 12:38  Reported: 9/25/2020 17:09  Ordering Phy(s): ELZBIETA MILES    For improved result formatting, select 'View Enhanced Report Format' under   Linked Documents section.  _________________________________________    SPECIMEN(S):  Bone marrow, right    INTERPRETATION:  Bone marrow, right:    Abnormal B lymphoblast population (9%)    COMMENT:  These findings are consistent  with persistent/recurrent involvement by B   acute lymphoblastic  leukemia/lymphoma. Final interpretation requires correlation with results   of other ancillary studies,  morphologic, and clinical features.    RESULTS:  Percentages reported below are based on the total number of CD45 positive   viable leukocytes. If applicable,  percentage of plasma cells is from total viable nucleated cells.    9% abnormal B-lymphoblast population with the following immunophenotype:  Positive for CD10, CD15 (predo minantly-negative), CD19 (dim), CD20   (dim-to-negative), CD22, CD24, CD34, CD38,  CD45 (dim), CD58, and variable CD79a.  Negative for CD5, CD33, and kappa and lambda light chains.    Resident/Fellow Review by:  Dr. Evelyn Redding    A resident/fellow in an ACGME accredited training program was involved in   the selection of testing, review of  flow scattergrams, and/or interpretation of this case. I, as the senior   physician, attest that I: (i)  confirmed appropriate testing, (ii) examined the relevant flow   scattergrams for the specimen(s); and (ii)  rendered or confirmed the interpretation(s).    ANTIBODIES:  Multi-color flow analysis is performed for the following markers: CD5,   CD10, CD15, CD19, CD20, CD22, CD24,  CD33, CD34, CD38, CD45, CD58, CD64, cytoplasmic CD79a, and kappa and   lambda immunoglobulin light chains. Cells  are gated to isolate populations (CD45 versus side scatter and forward   scatter versus side scatter), to  exclude debris (forward scatter versus side  scatter) and to exclude cell   doublets (forward scatter height  versus forward scatter width and side scatter height versus side scatter   width). Forward scatter varies with  cell size. Side scatter varies with the amount of cytoplasmic granules.   Intensity for CD45 usually increases  as hematolymphoid cells mature. The analytic sensitivity of this assay to   detect abnormal B-lymphoblasts as  rare flow events is 0.01%. This level of  sensitivity may not apply to   specimens with few cells such as  cerebrospinal fluid (CSF) specimens.    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    I have personally reviewed all specimens and/or slides, including the   listed special stains, and used them  with my medical judgment to determine the final diagnosis.    Electronically signed out by:  Kevin Richards MD    This test was developed and its performance characteristics determined by   [facility]. It has not been cleared or   approved by the US Food and Drug  Administration.  FDA does not require this test to go through premarket   FDA review. This test is used for  clinical purposes and should not be regarded as investigational or for   research.  This laboratory is certified  under the Clinical Laboratory Improvement Amendments (CLIA) as qualified   to perform high complexity clinical  laboratory testing.    CPT Codes:  A: 06411-LR, 61257-43-CIPR45(15), 23048-YSLS>15    TESTING LAB LOCATION:  [address] [id number] [phone]    COLLECTION SITE:  Client:  [facility]  Location:  [facility] ()     Send outs misc test     Status: None (In process)   Result Value Ref Range    Test Name CLONOSEQ      Send Outs Misc Test Code None     Send Outs Misc Test Specimen Bone marrow     Location Performed [employer]      Result PENDING    Glucose CSF: Tube 1     Status: None   Result Value Ref Range    Glucose CSF 53 40 - 70 mg/dL   Protein total CSF: Tube 1     Status: None   Result Value Ref Range    Protein Total CSF 40 15 - 60 mg/dL   Cell count with differential CSF: Tube 1     Status: Abnormal   Result Value Ref Range    WBC CSF 1 0 - 5 /uL    RBC CSF 11 (H) 0 - 2 /uL    Tube Number 2 #    Color  CSF Colorless CLRL^Colorless    Appearance CSF Clear CLER^Clear   Results for orders placed or performed in visit on 09/25/20   T-cell subset extended profile     Status: Abnormal   Result Value Ref Range    IFC Specimen Blood     CD3 Mature T 56 49 - 84 %    CD4 Redkey T 34 28 - 63 %    CD8 Suppressor T 21 10 - 40 %    CD19 B Cells <1 (L) 6 - 27 %    CD16 + 56 Natural Killer Cells 42 (H) 4 - 25 %    CD4:CD8 Ratio 1.62 1.40 - 2.60    Absolute CD3 266 (L) 603 - 2,990 cells/uL    Absolute CD4 163 (L) 441 - 2,156 cells/uL    Absolute CD8 101 (L) 125 - 1,312 cells/uL    Absolute CD19 <1 (L) 107 - 698 cells/uL    Absolute CD16+56 201 95 - 640 cells/uL   IgG     Status: None   Result Value Ref Range     610 - 1,616 mg/dL         Assessment/Plan:  Isael is a 20-year old male with 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 His day 28 studies showed no evidence of disease, absolute CD19 <1, and negative NGS. Unfortunately he relapsed today with 9% of abnormal blasts in his bone marrow.  I discussed his case with you, Dr. Paniagua.  I let Isael know that he should call the clinic on Monday to determine the plan.  We felt this was a safe plan as his counts are currently normal and he has no blasts in his peripheral blood.  Isael was on board with the plan and will let his family know.      1. ALL and BMT: High risk B cell ALL (CNS negative) + JAK2 activation. Received CAR-T infusion 7/27 after flu/cy conditioning. Maximum CRS grade III requiring fluids, dopamine, Tociluzimab x3, and steroids. Now off steroids without evidence of CRS or neurotoxicity. Day 28 bone marrow biopsy and CNS negative for disease. Bone marrow FISH, chromosomes, and NGS negative. Relapse today with CD19 dim, CD22 positive disease at 9%.   CNS pending.  B cells are less than one.   2. GVHD: No evidence of GVHD.    3. FEN/Renal:  No issues. S/p off TPN.    3. GI: Off protonix and tums now that he is off steroids. S/p ursodiol, his T bili and LFTs continue to improve.    4. ID: Off valtrex, last pentamidine was 8/24. S/p Levofloxacin. Continue bactrim.     5. Pulmonary: No issues.       6. Cardiovascular: Asymptomatic Aaron-Parkinson-White Syndrome diagnosed upon syncope in 02/2018. Asymptomatic, no interventions to date. Work up EKG c/w WPW, sinus bradycardia at 49 bpm. Work up ECHO normal with EF 68%. Some bradycardia during dopamine infusion while inpatient with CRS. No current concerns.     7. Heme: Transfuse for hgb <7, platelets < 10. Needs premedication with tylenol and benadryl prior to pRBC and platelets. S/p coagulopathy during CRS symptoms while inpatient.     Disposition: We will transition him to you Dr. Paniagua in hopes that we can get him into a remission to be able to undergo another transplant.     Patient seen and examined with Pediatric BMT Attending Dr. Zavala.    Edd Jones MD MPH  Pediatric BMT Fellow    Viky Zavala MD, PhD    Pediatric Blood and Marrow Transplant  [facility]    I, Viky Zavala MD PhD, saw this patient with the fellow and agree with the fellow s findings and plan of care as documented in the fellow s note.

## 2020-09-25 NOTE — PROGRESS NOTES
Infusion Nursing Note    Artemio Nino Presents to [facility] today for:    Port Labs     Due to :    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)  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)    Intravenous Access/Labs:   Port accessed using sterile technique. Labs drawn as ordered. Port heparin locked with 50U/5mL heparin. Left accessed for sedation appt

## 2020-09-25 NOTE — ANESTHESIA CARE TRANSFER NOTE
Patient: Artemio Nino    Procedure(s):  BIOPSY, BONE MARROW  LUMBAR PUNCTURE, DIAGNOSTIC    Diagnosis: ALL (acute lymphoblastic leukemia) (H) [C91.00]  Diagnosis Additional Information: No value filed.    Anesthesia Type:   MAC     Note:  Airway :Nasal Cannula  Patient transferred to: Recovery  Handoff Report: Identifed the Patient, Identified the Reponsible Provider, Reviewed the pertinent medical history, Discussed the surgical course, Reviewed Intra-OP anesthesia mangement and issues during anesthesia, Set expectations for post-procedure period and Allowed opportunity for questions and acknowledgement of understanding      Vitals: (Last set prior to Anesthesia Care Transfer)    CRNA VITALS  9/25/2020 1115 - 9/25/2020 1148      9/25/2020             Resp Rate (observed):  18                Electronically Signed By: SNOW Haque CRNA  September 25, 2020  11:48 AM

## 2020-09-26 LAB — MISCELLANEOUS TEST: NORMAL

## 2020-09-28 LAB
COPATH REPORT: NORMAL
COPATH REPORT: NORMAL

## 2020-09-29 LAB
APPEARANCE CSF: CLEAR
COLOR CSF: COLORLESS
RBC # CSF MANUAL: 11 /UL (ref 0–2)
TUBE # CSF: 2 #
WBC # CSF MANUAL: 1 /UL (ref 0–5)

## 2020-10-02 ENCOUNTER — APPOINTMENT (OUTPATIENT)
Dept: LAB | Facility: CLINIC | Age: 20
End: 2020-10-02
Attending: PEDIATRICS
Payer: COMMERCIAL

## 2020-10-02 ENCOUNTER — RESULTS ONLY (OUTPATIENT)
Dept: TRANSPLANT | Facility: CLINIC | Age: 20
End: 2020-10-02

## 2020-10-02 PROCEDURE — 999N001098 HC STATISTIC HLA ABY PRA SCREEN CLASS II: Performed by: PEDIATRICS

## 2020-10-02 PROCEDURE — 81378 HLA I & II TYPING HR: CPT | Performed by: PEDIATRICS

## 2020-10-02 PROCEDURE — 81382 HLA II TYPING 1 LOC HR: CPT | Mod: XU | Performed by: PEDIATRICS

## 2020-10-02 PROCEDURE — 86828 HLA CLASS I&II ANTIBODY QUAL: CPT | Performed by: PEDIATRICS

## 2020-10-05 ENCOUNTER — CARE COORDINATION (OUTPATIENT)
Dept: TRANSPLANT | Facility: CLINIC | Age: 20
End: 2020-10-05

## 2020-10-05 DIAGNOSIS — C91.01 ACUTE LYMPHOBLASTIC LEUKEMIA (ALL) IN REMISSION (H): Primary | ICD-10-CM

## 2020-10-06 LAB
SCR 1 TEST METHOD: NORMAL
SCR1 CELL: NORMAL
SCR1 COMMENTS: NORMAL
SCR1 RESULT: NORMAL
SCR2 CELL: NORMAL
SCR2 COMMENTS: NORMAL
SCR2 RESULT: NORMAL
SCR2 TEST METHOD: NORMAL

## 2020-10-12 LAB
A*: NORMAL
A*LOCUS SEROLOGIC EQUIVALENT: 1
A*LOCUS: NORMAL
A*SEROLOGIC EQUIVALENT: 2
AB TEST METHOD: NORMAL
ABNGS COMMENTS: NORMAL
B*: NORMAL
B*LOCUS SEROLOGIC EQUIVALENT: 65
B*LOCUS: NORMAL
B*SEROLOGIC EQUIVALENT: 57
BW-1: NORMAL
BW-2: NORMAL
C*: NORMAL
C*LOCUS SEROLOGIC EQUIVALENT: 6
C*LOCUS: NORMAL
C*SEROLOGIC EQUIVALENT: 8
DPA1*: NORMAL
DPB1*: NORMAL
DQA1*: NORMAL
DQA1*LOCUS: NORMAL
DQB1*: NORMAL
DQB1*LOCUS SEROLOGIC EQUIVALENT: 9
DQB1*LOCUS: NORMAL
DQB1*SEROLOGIC EQUIVALENT: 6
DRB1*: NORMAL
DRB1*LOCUS SEROLOGIC EQUIVALENT: 13
DRB1*LOCUS: NORMAL
DRB1*SEROLOGIC EQUIVALENT: 7
DRB3*LOCUS SEROLOGIC EQUIVALENT: 52
DRB3*LOCUS: NORMAL
DRB4*: NORMAL
DRB4*SEROLOGIC EQUIVALENT: NORMAL
DRNGS COMMENTS: NORMAL
DRSSO TEST METHOD: NORMAL
TRANSF REACT PLASRBC-IMP: NORMAL

## 2020-10-19 LAB
COPATH REPORT: NORMAL
COPATH REPORT: NORMAL

## 2020-10-25 NOTE — ANESTHESIA POSTPROCEDURE EVALUATION
Anesthesia POST Procedure Evaluation    Patient: Artemio Nino   MRN:     [id number] Gender:   male   Age:    20 year old :      2000        Preoperative Diagnosis: ALL (acute lymphoblastic leukemia) (H) [C91.00]   Procedure(s):  Large Bore Double Lumen NON TUNNELED Apheresis Catheter placement  Port placement  BIOPSY, BONE MARROW  LUMBAR PUNCTURE, DIAGNOSTIC   Postop Comments: No value filed.     Anesthesia Type: General       Disposition: Admission   Postop Pain Control: Uneventful            Sign Out: Well controlled pain   PONV: No   Neuro/Psych: Uneventful            Sign Out: Acceptable/Baseline neuro status   Airway/Respiratory: Uneventful            Sign Out: Acceptable/Baseline resp. status   CV/Hemodynamics: Uneventful            Sign Out: Acceptable CV status   Other NRE: NONE   DID A NON-ROUTINE EVENT OCCUR? No         Last Anesthesia Record Vitals:  CRNA VITALS  6/10/2020 1256 - 6/10/2020 1356      6/10/2020             Pulse:  52    Ht Rate:  51    Temp 2:  35.8  C (96.4  F)    SpO2:  99 %    Resp Rate (observed):  24      CRNA VITALS  6/10/2020 1357 - 6/10/2020 1457      6/10/2020             NIBP:  (!) 87/61    Pulse:  61    Temp:  35.9  C (96.6  F)    SpO2:  100 %    Resp Rate (observed):  12          Last PACU Vitals:  Vitals Value Taken Time   BP 76/55 06/10/20 1445   Temp 36.4  C (97.6  F) 06/10/20 1445   Pulse 63 06/10/20 1445   Resp 17 06/10/20 1445   SpO2 100 % 06/10/20 1445   Temp src     NIBP 87/61 06/10/20 1430   Pulse 61 06/10/20 1430   SpO2 100 % 06/10/20 1430   Resp     Temp 35.9  C (96.6  F) 06/10/20 1430   Ht Rate     Temp 2           Electronically Signed By: Yesenia Wallace MD, 2020, 6:09 PM

## 2020-10-29 LAB — TRANSF REACT PLASRBC-IMP: NORMAL

## 2020-11-05 ENCOUNTER — TELEPHONE (OUTPATIENT)
Dept: TRANSPLANT | Facility: CLINIC | Age: 20
End: 2020-11-05

## 2020-11-05 DIAGNOSIS — C91.01 ACUTE LYMPHOBLASTIC LEUKEMIA (ALL) IN REMISSION (H): Primary | ICD-10-CM

## 2020-11-22 ENCOUNTER — HEALTH MAINTENANCE LETTER (OUTPATIENT)
Age: 20
End: 2020-11-22

## 2021-01-07 LAB — COPATH REPORT: NORMAL

## 2021-01-11 ENCOUNTER — NURSE TRIAGE (OUTPATIENT)
Dept: TRANSPLANT | Facility: CLINIC | Age: 21
End: 2021-01-11

## 2021-01-11 DIAGNOSIS — Z76.82 BONE MARROW TRANSPLANT CANDIDATE: ICD-10-CM

## 2021-01-11 DIAGNOSIS — C91.01 ACUTE LYMPHOBLASTIC LEUKEMIA (ALL) IN REMISSION (H): Primary | ICD-10-CM

## 2021-01-13 ENCOUNTER — HOSPITAL ENCOUNTER (OUTPATIENT)
Facility: CLINIC | Age: 21
Setting detail: SPECIMEN
Discharge: HOME | End: 2021-01-13
Admitting: PEDIATRICS
Payer: COMMERCIAL

## 2021-01-13 ENCOUNTER — RESULTS ONLY (OUTPATIENT)
Dept: OTHER | Facility: CLINIC | Age: 21
End: 2021-01-13

## 2021-01-13 DIAGNOSIS — Z76.82 BONE MARROW TRANSPLANT CANDIDATE: ICD-10-CM

## 2021-01-13 DIAGNOSIS — C91.01 ACUTE LYMPHOBLASTIC LEUKEMIA (ALL) IN REMISSION (H): ICD-10-CM

## 2021-01-13 PROCEDURE — 86828 HLA CLASS I&II ANTIBODY QUAL: CPT | Performed by: PEDIATRICS

## 2021-01-13 PROCEDURE — 999N001098 HC STATISTIC HLA ABY PRA SCREEN CLASS II: Performed by: PEDIATRICS

## 2021-01-20 ENCOUNTER — CARE COORDINATION (OUTPATIENT)
Dept: TRANSPLANT | Facility: CLINIC | Age: 21
End: 2021-01-20

## 2021-01-20 DIAGNOSIS — C91.01 ACUTE LYMPHOBLASTIC LEUKEMIA (ALL) IN REMISSION (H): Primary | ICD-10-CM

## 2021-01-20 DIAGNOSIS — C91.00 LEUKEMIA, ACUTE LYMPHOID (H): ICD-10-CM

## 2021-01-20 NOTE — PROGRESS NOTES
Artemio is a 20 year old who is being evaluated via a billable video visit.      How would you like to obtain your AVS? MyChart  If the video visit is dropped, the invitation should be resent by: Text to cell phone: Isael's cell phone number  Will anyone else be joining your video visit? Yes: email. How would they like to receive their invitation? Send to e-mail at: [email or web address]      Video Start Time: 0900      Video-Visit Details    Type of service:  Video Visit    Video End Time:9:45    Originating Location (pt. Location): Home    Distant Location (provider location):  [facility] FOR PEDIATRIC BONE MARROW AND TRANSPLANTATION     Platform used for Video Visit: Sophia Kirkland is a 19 yo male with history of multiply relapsed ALL who has undergone a matched sibling transplant (relapsed at 1.5 years post transplant) , Kymriah (lost B cell aplasia and had new clone at day 60 post Kymriah) and PLAT-05 (only CD22 remains which does not persist) at [place].  He currently needs to undergo a second transplant with UCB as a donor in order to be cured and needs to do so ASAP as he is in a deep remission.  This is crucial for his survival.  We reviewed Artemio Nino treatment course so far and results from the latest bone marrow examination highlighting current disease status. For patients with ALL, studies have shown superior outcomes with disease in deep remission (MRD negative) which he currently is.       We discussed overall transplant procedure and details about conditioning regimen, possible adverse effects as well as transplant related complications including but not limited to mucositis, fevers, nausea, vomiting, hypertension, transfusion requirements, TPN and organ toxicity. We are planning on an UCB transplant as his stem cell donor.  As this is his second transplant, his risk of organ toxicity is increased.  He will undergo work up week next week and then be admitted for transplant  on February 8.      Viky Zavala MD, PhD    Pediatric Blood and Marrow Transplant  [facility]    I spent a total of 90 minutes with Artemio Nino on the date of encounter doing chart review, care coordination,  history and exam, review of labs/imaging, documentation and further activities as noted above.

## 2021-01-21 ENCOUNTER — DOCUMENTATION ONLY (OUTPATIENT)
Dept: TRANSPLANT | Facility: CLINIC | Age: 21
End: 2021-01-21

## 2021-01-21 ENCOUNTER — MEDICAL CORRESPONDENCE (OUTPATIENT)
Dept: TRANSPLANT | Facility: CLINIC | Age: 21
End: 2021-01-21

## 2021-01-21 NOTE — PROGRESS NOTES
Isael is a 19 yo male with history of multiply relapsed ALL who has undergone a matched sibling transplan(relapsed at 1.5 years post transplant) , Kymriah (lost B cell aplasia and had new clone at day 60 post Kymriah) and PLAT-05 (only CD22 remains which does not persist) at [place].  He currently needs to undergo a second transplant with UCB as a donor in order to be cured and needs to do so ASAP as he is in a deep remission.  This is crucial for his survival. .      Viky Zavala MD, PhD

## 2021-01-22 ENCOUNTER — VIRTUAL VISIT (OUTPATIENT)
Dept: TRANSPLANT | Facility: CLINIC | Age: 21
End: 2021-01-22
Attending: PEDIATRICS
Payer: COMMERCIAL

## 2021-01-22 DIAGNOSIS — C91.01 ACUTE LYMPHOBLASTIC LEUKEMIA (ALL) IN REMISSION (H): Primary | ICD-10-CM

## 2021-01-22 PROCEDURE — 99417 PROLNG OP E/M EACH 15 MIN: CPT | Performed by: PEDIATRICS

## 2021-01-22 PROCEDURE — 99215 OFFICE O/P EST HI 40 MIN: CPT | Mod: 95 | Performed by: PEDIATRICS

## 2021-01-22 NOTE — NURSING NOTE
"Artemio Nino is a 20 year old male who is being evaluated via a billable video visit.      The patient has been notified of following:     \"This video visit will be conducted via a call between you and your physician/provider. We have found that certain health care needs can be provided without the need for an in-person physical exam.  This service lets us provide the care you need with a video conversation.  If a prescription is necessary we can send it directly to your pharmacy.  If lab work is needed we can place an order for that and you can then stop by our lab to have the test done at a later time.    If during the course of the call the physician/provider feels a video visit is not appropriate, you will not be charged for this service.\"     Patient has given verbal consent for Video visit? Yes    Patient would like the video invitation sent by: Send to e-mail at: [email or web address]    Video Start Time: 900    Artemio Nino complains of    Chief Complaint   Patient presents with     New Patient     Patient here today for New consoult           I have reviewed and updated the patient's Past Medical History, Social History, Family History and Medication List.    ALLERGIES  Blood transfusion related (informational only) and Voriconazole    "

## 2021-01-25 DIAGNOSIS — C91.01 ACUTE LYMPHOBLASTIC LEUKEMIA (ALL) IN REMISSION (H): ICD-10-CM

## 2021-01-25 LAB
BMT WORKUP IRRADIATED BLOOD REQUIRED: NORMAL
COMMENT VXMB1: NORMAL
COMMENT VXMT1: NORMAL
CROSSMATCHDATEVXM: NORMAL
DONOR VXM: NORMAL
RESULT VXM B1: NORMAL
RESULT VXM T1: NORMAL
SERUM DATE VXM B1: NORMAL
SERUM DATE VXM T1: NORMAL

## 2021-01-25 NOTE — PROGRESS NOTES
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  He is in deep remission and is MRD negative. He is here today with his father to begin workup for a second transplant with an UCB as a donor.     Isael was diagnosed at age 17 with Ph-like ([id number] fusion, loss of IKZF1), which was highly resistant to initial treatment with Induction failure promoting an immediate transition to Inotuzumab, which resulted in a decline in MRD from 31% to 0.25%. Following HR Consolidation, Interim Maintenance (4 courses of HD Methotrexate) and a course of Cyclophosphamide/Etoposide (with 3 months of Ruxolitinib) he still had positive MRD and proceeded to a matched sib alloBMT with TBI Conditioning and post-transplant Ruxolitinib. Marrow relapse 19 months post-transplant in June 2020, bridged with maintenance chemotherapy and underwent lymphodepletion followed by CD19 CAR T-cell infusion on 7/27/20. 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 His Day 30 marrow MRD was negative by NGS. However, at day 60 he demonstrated 8% relapsed disease, CD22 positive, CD19 dim. He continues to have absolute CD19 B-cellls <1%. Today he is day +35 s/p PLAT-05 CAR-T from [place] which was completed entirely in the outpatient setting. Overall doing well with no signs or symptoms of CRS or neurotoxicity. Now with loss of CD22 CAR and rejection of CD19 directed CAR. No evidence of leukemia on day +21 procedures.  As this is his second transplant, his risk of  organ toxicity is increased.      Today he reports no recent illness or infection. No CRS, neurotoxicity or infectious complications from [place] CAR-T.  Regular diet. Sleeping well with good energy level.  He has not required blood products in approximately three months. He has a history of RBC and platelet transfusion reactions and requires premedications with tylenol and benadryl. He has on occasion also required doses of hydrocortisone. He last received pentamidine on 1/19 in [place]. His only current medication is valacyclovir. He has a port upper right chest.     ROS: A complete review of systems is negative except as noted in HPI    Past Oncologic History  Acute lymphoblastic leukemia (ALL) in relapse #1 (Resolved)   6/5/2020 Relapse   Acute lymphoblastic leukemia (ALL) in relapse #1  Peripheral blood, flow cytometry: B-lymphoblastic leukemia/lymphoma approximately 19 months status post stem cell transplant at the [facility]. Immunophenotypic analysis showed an abnormal CD45 population of cells representing approximately 80% of total events analyzed. This population was positive for CD34, HLA-DR, TdT, CD19, CD20 (dim), CD22, CD10, CD52 (dim), CD58 and CD9 (dim). They had dual expression of CD10/CD19 and TdT/CD19.  FISH at relapse #1  ([facility]) on 6/6/2020 PB: Loss of all or part of IKZF1 (91%)  ([facility]) on 7/14/2020 BMA: Loss of all or part of the IKZF1 locus (78%)    6/11/2020 - 9/25/2020 Cellular Therapy   6/11/2020 collected cells for [facility] CAR-T cell therapy  6/16/2020 - 7/12/2020 bridging chemo with VCR, IT Methotrexate, Oral 6MP, and Methotrexate  7/21/2020 - 7/24/2020 lymphodepleting chemo with Cy/Flu  7/27/2020 Kymriah infusion  Grade 3 CRS - treated with toci x 3 doses /dopamine. Neurotoxicity - got methylpred  MRD at d+30 after Kymriah: MRD neg by NGS  MRD at d+60 after Kymriah: 9% CD19 dim, Cd22 positive, NGS negative, FISH negative    Acute lymphoblastic leukemia (ALL)  in relapse #2   9/25/2020 Relapse   RELAPSE #2 of B-ALL based on 9% disease by flow at [facility], however NGS negative, FISH negative, single aberrant clone with complex karyotype noted    10/2/2020 - received 4 drug induction (VCR/DEX/DOX/PEG) with bortez per prior TACL study.    11/6 - marrow is flow negative at . Karyotype shows 2 abnormal cells with complex karyotype.    11/16/2020 - Cellular Therapy   PLAT-05  Consented and enrolled on 11/16/2020  Apheresis on 11/18/2020  Pre-LD disease assessments  12/16/2020: BM - MRD negative by flow but poor quality, likely peripheral blood. CNS1  Flu/Cy administered 12/16-12/19  [id number] infusion on 12/22/2020 - 1 x 10^6 CARs/kg    Past Medical History  - ALL  - Grade III CRS following Kymriah  - Palomino-Parkinson-White Syndrome  - C diff November 2020  - Spinal headaches  - GERD  - Weight loss  - Blood product transfusion reactions  - Demedex infestation  - Vitamin B12 deficiency        Past Surgical History  Past Surgical History:   Procedure Laterality Date     BONE MARROW BIOPSY, BONE SPECIMEN, NEEDLE/TROCAR Right 11/27/2018    Procedure: bone marrow biopsy;  Surgeon: Jacqueline Fitzgerald NP;  [facility]      BONE MARROW BIOPSY, BONE SPECIMEN, NEEDLE/TROCAR N/A 2/8/2019    Procedure: BIOPSY BONE MARROW;  Surgeon: Lisa Workman NP;  [facility]      BONE MARROW BIOPSY, BONE SPECIMEN, NEEDLE/TROCAR N/A 5/16/2019    Procedure: BIOPSY, BONE MARROW;  Surgeon: Lilia López APRN CNP;  [facility]     BONE MARROW BIOPSY, BONE SPECIMEN, NEEDLE/TROCAR N/A 11/7/2019    Procedure: Bone marrow biopsy;  Surgeon: Jacqueline Shin, NP;  [facility]      BONE MARROW BIOPSY, BONE SPECIMEN, NEEDLE/TROCAR Right 6/10/2020    Procedure: BIOPSY, BONE MARROW;  Surgeon: Candido Han PA-C;  [facility]      BONE MARROW BIOPSY, BONE SPECIMEN, NEEDLE/TROCAR N/A 8/24/2020    Procedure: Bone marrow biopsy;  Surgeon: Jacqueline Shin  NP;  [facility]      BONE MARROW BIOPSY, BONE SPECIMEN, NEEDLE/TROCAR N/A 9/25/2020    Procedure: BIOPSY, BONE MARROW;  Surgeon: Lilia López APRN CNP;  [facility]      ESOPHAGOSCOPY, GASTROSCOPY, DUODENOSCOPY (EGD), COMBINED N/A 2/22/2019    Procedure: Upper endoscopy with biopsy;  Surgeon: Magdalene Hobson MD;  [facility]      INSERT CATHETER VASCULAR ACCESS N/A 7/20/2020    Procedure: NON -APHERESIS Double lumen tunneled central burns line placement;  Surgeon: Pato Gómez PA-C;  [facility]      INSERT CATHETER VASCULAR ACCESS APHERESIS CHILD N/A 6/10/2020    Procedure: Large Bore Double Lumen NON TUNNELED Apheresis Catheter placement;  Surgeon: Link Rios PA-C;  [facility]      INSERT CATHETER VASCULAR ACCESS DOUBLE LUMEN CHILD N/A 10/26/2018    Procedure: double lumen tunneled line placement;  Surgeon: Rex Valente MD;  [facility]      INSERT PORT VASCULAR ACCESS N/A 6/10/2020    Procedure: Port placement;  Surgeon: Link Rios PA-C;  [facility]      IR CHEST PORT PLACEMENT > 5 YRS OF AGE  6/10/2020     IR CVC NON TUNNEL LINE REMOVAL  6/12/2020     IR CVC NON TUNNEL PLACEMENT  6/10/2020     IR CVC TUNNEL PLACEMENT > 5 YRS OF AGE  7/20/2020     IR CVC TUNNEL REMOVAL LEFT  2/8/2019     IR CVC TUNNEL REMOVAL LEFT  8/24/2020     IR PORT REMOVAL LEFT  5/16/2019     O CLAVICLE LEFT Left     fracture with surgical repair and plate/screws     ORTHOPEDIC SURGERY      femur     REMOVE CATHETER VASCULAR ACCESS N/A 2/8/2019    Procedure: Tunneled line removal;  Surgeon: Krystal Esparza MD;  [facility]      REMOVE CATHETER VASCULAR ACCESS N/A 8/24/2020    Procedure: tunneled line removal;  Surgeon: Siri Hernandez PA-C;  [facility]      REMOVE PORT VASCULAR ACCESS N/A 5/16/2019    Procedure: REMOVAL, VASCULAR ACCESS PORT;   "Surgeon: Link Rios PA-C;  Location:  OR     Family History  Sister: MRSA infection  Maternal grandfather: Thrombocytopenia  Paternal grandfather: Prostate cancer, type 2 diabetes.    Social History  Isael will live with his grandparents in [place], [place]. His father and stepmother live locally. His mother is currently in [place] and will be traveling to [place] to help manage his care.   [facility]'s parents Dangelo and Mervat are  but amicable. They are both involved in Artemio's care as sources of support. Dangelo is remarried to his wife Camryn and Mervat has a significant other named Jada.     Medications  Valacyclovir  Pentamidine monthly    Allergies   Voriconazole (rash)  Chlorhexidine (rash)  Blood product transfusion reaction (hives)    Physical Exam   /69 (BP Location: Right arm, Patient Position: Sitting, Cuff Size: Adult Regular)   Pulse 60   Temp 98  F (36.7  C) (Oral)   Resp 16   Ht 1.755 m (5' 9.09\")   Wt 63.5 kg (139 lb 15.9 oz)   SpO2 98%   BMI 20.62 kg/m    GEN: Sitting on exam table in no acute distress. Pleasant and cooperative. Father present  HEENT: Head NC/AT, TMs clear bilaterally, PERRL, EOMs intact, sclerae clear, nares patent, OP clear, tongue normal, MMM  NECK: Supple. No cervical or clavicular lymphadenopathy  CARD: RRR, normal S1/S2 without murmur  RESP: Lungs clear to auscultation bilaterally. Normal work of breathing. 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  He is in deep remission and is MRD negative. He is here today with his father to begin workup for a second transplant with an UCB as a donor.  Now with loss of CD22 CAR and rejection of CD19 directed CAR. No evidence of leukemia on day +21 procedures.  As this is his second transplant, his risk of organ toxicity is increased.    BMT:  #  ALL and BMT: High risk B cell ALL (CNS negative) + JAK2 activation.   - Isael underwent a matched sibling transplant (relapsed at 1.5 years post transplant) , Kymriah (lost B cell aplasia and had new clone at day 60 post [facility]) and PLAT-05 at [place].  He is in deep remission and is MRD negative.  Now with loss of CD22 CAR and rejection of CD19 directed CAR. No evidence of leukemia on day +21 procedures.  Received CAR-T infusion 7/27 after flu/cy conditioning. Maximum CRS grade III requiring fluids, dopamine, Tociluzimab x3, and steroids. Now off steroids without evidence of CRS or neurotoxicity.   - As this is his second transplant, his risk of organ toxicity is increased.  - Admit to unit 4 (2/8) following central line placement. Prep regimen per [id number] with Thiotepa (days -7 trough -6), Fludarabine (days -5 through -3), Busulfan (days -5 through -3), ATG (days -4 through -2), Rest day (day -1), UCBTX (2/16).   - Pharmacy consult (1/26). Note pending  - Neuropsychology testing in [facility] (1/27)  - Line Assessment (1/28)  - Exit conference (1/29)    #  Risk for GVHD: Prophylaxis with Tacrolimus and MMF beginning day -3.   - Tacrolimus through day +100 then taper if no evidence of GVHD. Close monitoring of drug levels.  - MMF through day +30, or 7 days after engraftment, whichever is later.      FEN/Renal:  # Risk for malnutrition: Currently no concerns.  Regular diet  - Previously required TPN/SMOF lipids  - Monitor nutritional intake    # Risk for electrolyte abnormalities:  Electrolyte panel (1/26) normal.  - Check daily electrolytes following  admission    # Risk for renal dysfunction and fluid overload:  - GFR (1/28): The glomerular filtration rate was 122.58 ml/min.  The normalized GFR equals 119.11 ml/min.   - Monitor I/O's and daily weights    # Risk for aHUS/TA-TMA: Close monitoring following admission.  - Monitor LDH qMonday  - Monitor urine protein/creatinine qTuesday    Pulmonary:  # Risk for pulmonary insufficiency: No current concerns  - Chest and sinus CTs unremarkable  - PFTs scheduled 1/26    Cardiovascular:  - Hx of asymptomatic WPW syndrome 2018 and hx of bradycardia during CRS.  Per Dr. Plummer, no contraindications to proceeding with UCBTX. She requested her wear a leadless EKG monitor for 24 hours to verify absence of brieft runs of SVT. She recommends following NT-proBNP and troponin during the transplant periodically.  He also needs an updated lipid panel. She ordered all for a future blood draw.  Artemio  does not require SBE prophylaxis for dental or contaminated procedures.  Artemio may be allowed activity ad jose de jesus to his own limits.  Follow-up with an Echo in 6 months.    # Echocardiogram (1/26): Normal echocardiogram. The left and right ventricles have normal chamber size, wall thickness, and systolic function. The calculated biplane left ventricular ejection fraction is 64 %. A catheter is seen with its tip in the right atrium. No pericardial effusion.     # EKG (1/27): EKG revealed normal sinus rhythm with WPW at a rate of 51 beats/minute.  AR interval was short at 98 msec with pre-excitation preent; QTc interval was normal at 405 msec.  QRS axis was normal at +87 degrees.  There were no ST-T wave changes present.    # Hypotension: Resolved. CRS related    # Risk for hypertension secondary to medications: Closely monitor following admission.    # Asymptomatic Aaron-Parkinson-White Syndrome. Diagnosed upon syncope in 02/2018. Asymptomatic, no interventions to date  Bradycardia noted during dopamine infusion while inpatient with  CRS.     Heme:   # Pancytopenia secondary to chemotherapy  - Transfuse for hemoglobin < 7 , platelets < 10,000  - History of blood product transfusion reactions (hives) to both RBCs and platelets. Premedications with tylenol and benadryl are necessary. Assess need for adding hydrocortisone premed as well.  - GCSF 5 mcg/kg IV daily to begin day +5 per protocol and continue until ANC >/= 2500 x 3 consecutive days.    # Epistaxis: Hx of epistaxis during his last admission.     Infectious Disease:  # Risk for infection given immunocompromised status  Active: None  Prophylaxis: CMV and HSV IgG antibody positive.      -- Viral prophylaxis: Valacyclovir currently. High dose acyclovir on admission  -- Fungal: History of photoxic drug eruption with voriconazole, avoid use. Micafungin on admission. Transition to either itraconazole or posaconazole when able.  -- Bacterial: Standard  -- PCP prophylaxis: Pentamidine last administered 1/19/21 in [place]. Bactrim at day +28 if criteria are met.    # Imaging  Chest CT (1/26):  No acute findings within the chest. No finding suspicious  for malignancy.  Sinus CT (1/26): Unremarkable. No evidence of infection.    Past infections:   C. Diff November 2020    GI:   # Nausea management: No current concerns. He has previously used scheduled zofran, ativan and a scopolamine patch. Benadryl prn.    # Reflux: Isael has a history of severe reflux with chemotherapy. He will likely require both protonix and TUMS    # Risk for VOD  - Ursodiol TID upon admission    Neuro:  # CRS history. 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  - No CRS, neurotoxicity or infectious complications from [place] CAR-T.    # Mucositis: Morphine drip as indicated. Isael also requested magic mouthwash and tylenol for pain.    # Seizure prophylaxis: Keppra during busulfan.    # History of significant spinal  headaches    Fertility:  Sperm collected previously collected for fertility preservation    Candido Han PA-C  Pediatric Blood and Marrow Transplant Program  [facility]    I spent a total of 150 minutes with Artemio Nino on the date of encounter doing chart review, history and exam, review of labs/imaging, documentation and further activities as noted above.       Patient Active Problem List   Diagnosis     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)     Aaron-Parkinson-White (WPW) syndrome     Bone marrow transplant candidate     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)     At risk for infection     S/P allogeneic bone marrow transplant (H)     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

## 2021-01-25 NOTE — PHARMACY-CONSULT NOTE
BMT Pre-transplant Pharmacy Consult Note     History of Present Illness (Brief):   Artemio is a 20 year old male with ALL who presents today with his dad as part of work-up for an UCBT. Artemio will receive a preparative regimen according to protocol 2013-09C w/o TBI.     Allergies/Adverse Reactions to Medications/Food/Other Agents & Medication to Avoid:   Voriconazole - significant rash      Current Medications Include:   The patient is currently taking the following medication:   1. Valtrex 500 mg PO BID  2. Pentam IV monthly - last 1/19/2021.    I instructed Richiethejovana to continue all medications through admission.     Patient Preference for Medications:   Artemio prefers that medication comes as pills.      Herbal Medication/Essential Oils/Nutritional Supplements:   I discussed the importance of avoiding the use of herbal products during the transplant and post transplant period while on immunosuppressants, and risk of potential drug/herb interactions.     Chemotherapy:   Artemio's family and I reviewed the chemotherapy that Artemio will receive as part of his preparative regimen:   1. Thiotepa on Day -7&-6  2. Fludarabine Days -6, -5 & -4  3. Busulfan on Days -6, -5, and -4  4. Rabbit ATG -4, -3 and -2    Other supportive medications that Artemio will also receive include:  1. GCSF to start Day+5 and continue until ANC is >2500 x 3 days  2. Keppra  3. Pre-medications with Rabbit ATC  4. Pantoprazole  5. Ursodiol    We discussed the common side effects of the chemotherapy and supportive medications.  We also briefly discussed the possible need for TPN as nutritional support if nausea, vomiting, and mucositis make it difficult for Artemio to eat.    Immunosuppression:   We discussed the immunosuppression agents that Artemio will receive as part of his GVHD prophylaxis:   1. Tacrolimus to start Day -3 and continue through Day +100, then taper.   Goal levels of 10-15 through Day +14, then 5-10    2. Mycophenolate Mofetil start     We discussed the common side effects of these medications. We discussed the importance of drug levels with these medications and how we get these drug levels.     Nausea/Vomiting issues:   Isael brought up that he usually has bad reflux with chemotherapy. He utilizes protonix and tums.   The anti-emetic plan will be a continuous infusion of ondansetron + scheduled ativan + scopolamine patch with diphenhydramine for breakthrough nausea and vomiting.      Pain issues:   We discussed our standard approach to pain management (e.g. Morphine drip). Isael also requested magic mouthwash and tylenol for mucositis pain.     Infection Prophylaxis:   Viral prophylaxis: HiDose Acyclovir CMV Recipient: Positive, HSV Recipient: Negative  Fungal prophylaxis: Micafungin on admission, transition to itraconazole or posaconazole when able   PCP prophylaxis: Bactrim  Bacterial prophylaxis: Standard    MRSA: Low Risk    We discussed that the patient will need to be re-immunized starting 1 year post transplant & all family members and care givers should be up to date on all immunizations.    Infection History  Clostridioides difficile infection 11/16/2020     Other Information pertinent to transplant:   Kidney function: 1/26/2021 [facility] GFR study = 122.58 ml/min/1.73m2.   Pulmonary function: 1/26/2021 Chest CT/Xray = WNL  Cardiology function:  1/27 QTc = 369  Nutrition: No current concerns, previously required TPN during BMT    Summary:   I met with Artemio and his dad today to complete the medication history, discuss medication preferences, review chemotherapy, immunosuppression, anti-infectives and other supportive medications Artemio will receive as part of transplant. We had a good discussion; Artemio and his dad asked appropriate questions and expressed understanding.     Recommendations:     1. Assign GWN videos on admission for expected medications during transplant.   2. Artemio's [facility]  plan is written to be released on Day -8.   3. Artemio's actual body weight (ABW) = 61 kg and ideal  Body weight  (IBW) = 59.7 kg.    Therefore, his  medications do not need to be adjusted for obesity.   IBW Calculation: >18 years old, males IBW = 50kg + [2.3kg x (height in inches - 60)]   4. Isael experiences bad reflux with chemotherapy. He utilizes protonix and tums.   5. For nausea/vomitting plan: continuous infusion of ondansetron + scheduled ativan + scopolamine patch with diphenhydramine for breakthrough nausea and vomiting.   6. Iseal also requested magic mouthwash and tylenol for mucositis pain.       It was a pleasure to be involved in Artemio s care.  Pharmacy will continue to follow.  Francisca Culp, PharmD

## 2021-01-26 ENCOUNTER — INFUSION THERAPY VISIT (OUTPATIENT)
Dept: INFUSION THERAPY | Facility: CLINIC | Age: 21
End: 2021-01-26
Attending: PHYSICIAN ASSISTANT
Payer: COMMERCIAL

## 2021-01-26 ENCOUNTER — ALLIED HEALTH/NURSE VISIT (OUTPATIENT)
Dept: TRANSPLANT | Facility: CLINIC | Age: 21
End: 2021-01-26
Attending: PHYSICIAN ASSISTANT
Payer: COMMERCIAL

## 2021-01-26 ENCOUNTER — ALLIED HEALTH/NURSE VISIT (OUTPATIENT)
Dept: TRANSPLANT | Facility: CLINIC | Age: 21
End: 2021-01-26
Attending: PEDIATRICS
Payer: COMMERCIAL

## 2021-01-26 ENCOUNTER — HOSPITAL ENCOUNTER (OUTPATIENT)
Dept: CT IMAGING | Facility: CLINIC | Age: 21
End: 2021-01-26
Attending: PEDIATRICS
Payer: COMMERCIAL

## 2021-01-26 ENCOUNTER — RESULTS ONLY (OUTPATIENT)
Dept: OTHER | Facility: CLINIC | Age: 21
End: 2021-01-26

## 2021-01-26 ENCOUNTER — APPOINTMENT (OUTPATIENT)
Dept: LAB | Facility: CLINIC | Age: 21
End: 2021-01-26
Attending: PEDIATRICS
Payer: COMMERCIAL

## 2021-01-26 ENCOUNTER — HOSPITAL ENCOUNTER (OUTPATIENT)
Dept: CARDIOLOGY | Facility: CLINIC | Age: 21
End: 2021-01-26
Attending: PEDIATRICS
Payer: COMMERCIAL

## 2021-01-26 VITALS
DIASTOLIC BLOOD PRESSURE: 69 MMHG | HEART RATE: 60 BPM | WEIGHT: 139.99 LBS | RESPIRATION RATE: 16 BRPM | OXYGEN SATURATION: 98 % | BODY MASS INDEX: 20.73 KG/M2 | SYSTOLIC BLOOD PRESSURE: 102 MMHG | HEIGHT: 69 IN | TEMPERATURE: 98 F

## 2021-01-26 DIAGNOSIS — Z71.9 ENCOUNTER FOR COUNSELING: Primary | ICD-10-CM

## 2021-01-26 DIAGNOSIS — Z76.82 BONE MARROW TRANSPLANT CANDIDATE: Primary | ICD-10-CM

## 2021-01-26 DIAGNOSIS — C91.01 ACUTE LYMPHOBLASTIC LEUKEMIA (ALL) IN REMISSION (H): ICD-10-CM

## 2021-01-26 DIAGNOSIS — C91.01 ACUTE LYMPHOBLASTIC LEUKEMIA (ALL) IN REMISSION (H): Primary | ICD-10-CM

## 2021-01-26 DIAGNOSIS — C91.00 LEUKEMIA, ACUTE LYMPHOID (H): ICD-10-CM

## 2021-01-26 DIAGNOSIS — C91.02 ALL (ACUTE LYMPHOID LEUKEMIA) IN RELAPSE (H): ICD-10-CM

## 2021-01-26 DIAGNOSIS — C91.00 ALL (ACUTE LYMPHOCYTIC LEUKEMIA) (H): Primary | ICD-10-CM

## 2021-01-26 DIAGNOSIS — Z94.81 S/P ALLOGENEIC BONE MARROW TRANSPLANT (H): Primary | ICD-10-CM

## 2021-01-26 LAB
ABO + RH BLD: NORMAL
ABO + RH BLD: NORMAL
ALBUMIN SERPL-MCNC: 3.7 G/DL (ref 3.4–5)
ALBUMIN UR-MCNC: NEGATIVE MG/DL
ALP SERPL-CCNC: 40 U/L (ref 40–150)
ALT SERPL W P-5'-P-CCNC: 39 U/L (ref 0–70)
ANION GAP SERPL CALCULATED.3IONS-SCNC: 3 MMOL/L (ref 3–14)
APPEARANCE UR: CLEAR
APTT PPP: 27 SEC (ref 22–37)
AST SERPL W P-5'-P-CCNC: 27 U/L (ref 0–45)
BASOPHILS # BLD AUTO: 0 10E9/L (ref 0–0.2)
BASOPHILS NFR BLD AUTO: 0.1 %
BILIRUB SERPL-MCNC: 0.2 MG/DL (ref 0.2–1.3)
BILIRUB UR QL STRIP: NEGATIVE
BLD GP AB SCN SERPL QL: NORMAL
BLOOD BANK CMNT PATIENT-IMP: NORMAL
BUN SERPL-MCNC: 11 MG/DL (ref 7–30)
CALCIUM SERPL-MCNC: 9.3 MG/DL (ref 8.5–10.1)
CHLORIDE SERPL-SCNC: 109 MMOL/L (ref 94–109)
CO2 SERPL-SCNC: 30 MMOL/L (ref 20–32)
COLOR UR AUTO: ABNORMAL
CREAT SERPL-MCNC: 0.66 MG/DL (ref 0.66–1.25)
DIFFERENTIAL METHOD BLD: ABNORMAL
EOSINOPHIL # BLD AUTO: 0.1 10E9/L (ref 0–0.7)
EOSINOPHIL NFR BLD AUTO: 0.8 %
ERYTHROCYTE [DISTWIDTH] IN BLOOD BY AUTOMATED COUNT: 13.4 % (ref 10–15)
GFR SERPL CREATININE-BSD FRML MDRD: >90 ML/MIN/{1.73_M2}
GLUCOSE SERPL-MCNC: 118 MG/DL (ref 70–99)
GLUCOSE UR STRIP-MCNC: NEGATIVE MG/DL
HBV CORE AB SERPL QL IA: NONREACTIVE
HBV SURFACE AG SERPL QL IA: NONREACTIVE
HCT VFR BLD AUTO: 41.5 % (ref 40–53)
HCV AB SERPL QL IA: NONREACTIVE
HGB BLD-MCNC: 14.2 G/DL (ref 13.3–17.7)
HGB UR QL STRIP: NEGATIVE
HIV 1+2 AB+HIV1 P24 AG SERPL QL IA: NONREACTIVE
IMM GRANULOCYTES # BLD: 0 10E9/L (ref 0–0.4)
IMM GRANULOCYTES NFR BLD: 0.4 %
INR PPP: 0.99 (ref 0.86–1.14)
KETONES UR STRIP-MCNC: NEGATIVE MG/DL
LABORATORY COMMENT REPORT: NORMAL
LDH SERPL L TO P-CCNC: 228 U/L (ref 85–227)
LEUKOCYTE ESTERASE UR QL STRIP: ABNORMAL
LYMPHOCYTES # BLD AUTO: 0.3 10E9/L (ref 0.8–5.3)
LYMPHOCYTES NFR BLD AUTO: 3.6 %
MCH RBC QN AUTO: 33 PG (ref 26.5–33)
MCHC RBC AUTO-ENTMCNC: 34.2 G/DL (ref 31.5–36.5)
MCV RBC AUTO: 97 FL (ref 78–100)
MONOCYTES # BLD AUTO: 0.6 10E9/L (ref 0–1.3)
MONOCYTES NFR BLD AUTO: 7.3 %
MUCOUS THREADS #/AREA URNS LPF: PRESENT /LPF
NEUTROPHILS # BLD AUTO: 7 10E9/L (ref 1.6–8.3)
NEUTROPHILS NFR BLD AUTO: 87.8 %
NITRATE UR QL: NEGATIVE
NRBC # BLD AUTO: 0 10*3/UL
NRBC BLD AUTO-RTO: 0 /100
PH UR STRIP: 5.5 PH (ref 5–7)
PLATELET # BLD AUTO: 168 10E9/L (ref 150–450)
POTASSIUM SERPL-SCNC: 3.9 MMOL/L (ref 3.4–5.3)
PROT SERPL-MCNC: 6.6 G/DL (ref 6.8–8.8)
RBC # BLD AUTO: 4.3 10E12/L (ref 4.4–5.9)
RBC #/AREA URNS AUTO: 1 /HPF (ref 0–2)
SARS-COV-2 RNA RESP QL NAA+PROBE: NEGATIVE
SARS-COV-2 RNA RESP QL NAA+PROBE: NORMAL
SODIUM SERPL-SCNC: 142 MMOL/L (ref 133–144)
SOURCE: ABNORMAL
SP GR UR STRIP: 1.02 (ref 1–1.03)
SPECIMEN EXP DATE BLD: NORMAL
SPECIMEN SOURCE: NORMAL
SPECIMEN SOURCE: NORMAL
T PALLIDUM AB SER QL: NONREACTIVE
URATE SERPL-MCNC: 4.2 MG/DL (ref 3.5–7.2)
UROBILINOGEN UR STRIP-MCNC: NORMAL MG/DL (ref 0–2)
WBC # BLD AUTO: 8 10E9/L (ref 4–11)
WBC #/AREA URNS AUTO: 1 /HPF (ref 0–5)

## 2021-01-26 PROCEDURE — 70486 CT MAXILLOFACIAL W/O DYE: CPT | Mod: 26 | Performed by: RADIOLOGY

## 2021-01-26 PROCEDURE — 85730 THROMBOPLASTIN TIME PARTIAL: CPT | Performed by: PEDIATRICS

## 2021-01-26 PROCEDURE — G0463 HOSPITAL OUTPT CLINIC VISIT: HCPCS | Mod: 25

## 2021-01-26 PROCEDURE — 81378 HLA I & II TYPING HR: CPT | Performed by: PHYSICIAN ASSISTANT

## 2021-01-26 PROCEDURE — 94375 RESPIRATORY FLOW VOLUME LOOP: CPT | Mod: 26 | Performed by: PEDIATRICS

## 2021-01-26 PROCEDURE — 86644 CMV ANTIBODY: CPT | Performed by: PEDIATRICS

## 2021-01-26 PROCEDURE — 250N000011 HC RX IP 250 OP 636

## 2021-01-26 PROCEDURE — 94729 DIFFUSING CAPACITY: CPT | Mod: 26 | Performed by: PEDIATRICS

## 2021-01-26 PROCEDURE — 94726 PLETHYSMOGRAPHY LUNG VOLUMES: CPT | Mod: 26 | Performed by: PEDIATRICS

## 2021-01-26 PROCEDURE — 86665 EPSTEIN-BARR CAPSID VCA: CPT | Performed by: PEDIATRICS

## 2021-01-26 PROCEDURE — 70486 CT MAXILLOFACIAL W/O DYE: CPT

## 2021-01-26 PROCEDURE — 99417 PROLNG OP E/M EACH 15 MIN: CPT | Performed by: PHYSICIAN ASSISTANT

## 2021-01-26 PROCEDURE — 80053 COMPREHEN METABOLIC PANEL: CPT | Performed by: PEDIATRICS

## 2021-01-26 PROCEDURE — 83615 LACTATE (LD) (LDH) ENZYME: CPT | Performed by: PEDIATRICS

## 2021-01-26 PROCEDURE — U0003 INFECTIOUS AGENT DETECTION BY NUCLEIC ACID (DNA OR RNA); SEVERE ACUTE RESPIRATORY SYNDROME CORONAVIRUS 2 (SARS-COV-2) (CORONAVIRUS DISEASE [COVID-19]), AMPLIFIED PROBE TECHNIQUE, MAKING USE OF HIGH THROUGHPUT TECHNOLOGIES AS DESCRIBED BY CMS-2020-01-R: HCPCS | Performed by: PEDIATRICS

## 2021-01-26 PROCEDURE — 71250 CT THORAX DX C-: CPT

## 2021-01-26 PROCEDURE — 86833 HLA CLASS II HIGH DEFIN QUAL: CPT | Performed by: PHYSICIAN ASSISTANT

## 2021-01-26 PROCEDURE — 99207 PR NO CHARGE NURSE ONLY: CPT

## 2021-01-26 PROCEDURE — 86803 HEPATITIS C AB TEST: CPT | Performed by: PEDIATRICS

## 2021-01-26 PROCEDURE — 36591 DRAW BLOOD OFF VENOUS DEVICE: CPT

## 2021-01-26 PROCEDURE — 81001 URINALYSIS AUTO W/SCOPE: CPT | Performed by: PEDIATRICS

## 2021-01-26 PROCEDURE — 81382 HLA II TYPING 1 LOC HR: CPT | Performed by: PHYSICIAN ASSISTANT

## 2021-01-26 PROCEDURE — 87798 DETECT AGENT NOS DNA AMP: CPT | Performed by: PEDIATRICS

## 2021-01-26 PROCEDURE — 86695 HERPES SIMPLEX TYPE 1 TEST: CPT | Performed by: PEDIATRICS

## 2021-01-26 PROCEDURE — 94150 VITAL CAPACITY TEST: CPT

## 2021-01-26 PROCEDURE — 84550 ASSAY OF BLOOD/URIC ACID: CPT | Performed by: PEDIATRICS

## 2021-01-26 PROCEDURE — 85610 PROTHROMBIN TIME: CPT | Performed by: PEDIATRICS

## 2021-01-26 PROCEDURE — 83021 HEMOGLOBIN CHROMOTOGRAPHY: CPT | Performed by: PEDIATRICS

## 2021-01-26 PROCEDURE — 94729 DIFFUSING CAPACITY: CPT

## 2021-01-26 PROCEDURE — 94375 RESPIRATORY FLOW VOLUME LOOP: CPT

## 2021-01-26 PROCEDURE — 87516 HEPATITIS B DNA AMP PROBE: CPT | Performed by: PEDIATRICS

## 2021-01-26 PROCEDURE — 86790 VIRUS ANTIBODY NOS: CPT | Performed by: PEDIATRICS

## 2021-01-26 PROCEDURE — 87340 HEPATITIS B SURFACE AG IA: CPT | Performed by: PEDIATRICS

## 2021-01-26 PROCEDURE — 86901 BLOOD TYPING SEROLOGIC RH(D): CPT | Performed by: PEDIATRICS

## 2021-01-26 PROCEDURE — 94726 PLETHYSMOGRAPHY LUNG VOLUMES: CPT

## 2021-01-26 PROCEDURE — 86696 HERPES SIMPLEX TYPE 2 TEST: CPT | Performed by: PEDIATRICS

## 2021-01-26 PROCEDURE — 86704 HEP B CORE ANTIBODY TOTAL: CPT | Performed by: PEDIATRICS

## 2021-01-26 PROCEDURE — 86828 HLA CLASS I&II ANTIBODY QUAL: CPT | Performed by: PHYSICIAN ASSISTANT

## 2021-01-26 PROCEDURE — 87535 HIV-1 PROBE&REVERSE TRNSCRPJ: CPT | Performed by: PEDIATRICS

## 2021-01-26 PROCEDURE — 86753 PROTOZOA ANTIBODY NOS: CPT | Performed by: PEDIATRICS

## 2021-01-26 PROCEDURE — U0005 INFEC AGEN DETEC AMPLI PROBE: HCPCS | Performed by: PEDIATRICS

## 2021-01-26 PROCEDURE — 87521 HEPATITIS C PROBE&RVRS TRNSC: CPT | Performed by: PEDIATRICS

## 2021-01-26 PROCEDURE — 86900 BLOOD TYPING SEROLOGIC ABO: CPT | Performed by: PEDIATRICS

## 2021-01-26 PROCEDURE — 87389 HIV-1 AG W/HIV-1&-2 AB AG IA: CPT | Performed by: PEDIATRICS

## 2021-01-26 PROCEDURE — 85025 COMPLETE CBC W/AUTO DIFF WBC: CPT | Performed by: PEDIATRICS

## 2021-01-26 PROCEDURE — 99215 OFFICE O/P EST HI 40 MIN: CPT | Performed by: PHYSICIAN ASSISTANT

## 2021-01-26 PROCEDURE — 93306 TTE W/DOPPLER COMPLETE: CPT | Mod: 26 | Performed by: PEDIATRICS

## 2021-01-26 PROCEDURE — 999N001098 HC STATISTIC HLA ABY PRA SCREEN CLASS II: Performed by: PHYSICIAN ASSISTANT

## 2021-01-26 PROCEDURE — 86832 HLA CLASS I HIGH DEFIN QUAL: CPT | Performed by: PHYSICIAN ASSISTANT

## 2021-01-26 PROCEDURE — 71250 CT THORAX DX C-: CPT | Mod: 26 | Performed by: RADIOLOGY

## 2021-01-26 PROCEDURE — 93306 TTE W/DOPPLER COMPLETE: CPT

## 2021-01-26 PROCEDURE — 86850 RBC ANTIBODY SCREEN: CPT | Performed by: PEDIATRICS

## 2021-01-26 PROCEDURE — 86780 TREPONEMA PALLIDUM: CPT | Performed by: PEDIATRICS

## 2021-01-26 RX ORDER — HEPARIN SODIUM (PORCINE) LOCK FLUSH IV SOLN 100 UNIT/ML 100 UNIT/ML
SOLUTION INTRAVENOUS
Status: COMPLETED
Start: 2021-01-26 | End: 2021-01-26

## 2021-01-26 RX ADMIN — HEPARIN: 100 SYRINGE at 09:08

## 2021-01-26 ASSESSMENT — PAIN SCALES - GENERAL: PAINLEVEL: NO PAIN (0)

## 2021-01-26 ASSESSMENT — MIFFLIN-ST. JEOR: SCORE: 1626.87

## 2021-01-26 NOTE — PROGRESS NOTES
Infusion Nursing Note    Artemio Nino Presents to [facility] today for: Add on for port access/labs    Due to : ALL    Infusion Note: Pt. Added on to infusion schedule for port access/labs.  Port accessed by sterile technique without issue.  Labs drawn as ordered.  Pt. deaccessed immediately after.  Pt. Seen and assessed by Candido Han NP after labs drawn.

## 2021-01-27 ENCOUNTER — OFFICE VISIT (OUTPATIENT)
Dept: PEDIATRIC CARDIOLOGY | Facility: CLINIC | Age: 21
End: 2021-01-27
Attending: PEDIATRICS
Payer: COMMERCIAL

## 2021-01-27 ENCOUNTER — OFFICE VISIT (OUTPATIENT)
Dept: PSYCHOLOGY | Facility: CLINIC | Age: 21
End: 2021-01-27
Payer: COMMERCIAL

## 2021-01-27 VITALS
HEART RATE: 65 BPM | DIASTOLIC BLOOD PRESSURE: 74 MMHG | OXYGEN SATURATION: 99 % | WEIGHT: 139.99 LBS | RESPIRATION RATE: 16 BRPM | SYSTOLIC BLOOD PRESSURE: 111 MMHG | BODY MASS INDEX: 20.73 KG/M2 | HEIGHT: 69 IN

## 2021-01-27 VITALS — TEMPERATURE: 97.4 F

## 2021-01-27 DIAGNOSIS — C91.00 ALL (ACUTE LYMPHOBLASTIC LEUKEMIA OF INFANT) (H): Primary | ICD-10-CM

## 2021-01-27 DIAGNOSIS — Z94.81 STATUS POST BONE MARROW TRANSPLANT (H): Primary | ICD-10-CM

## 2021-01-27 DIAGNOSIS — Z76.82 BONE MARROW TRANSPLANT CANDIDATE: ICD-10-CM

## 2021-01-27 DIAGNOSIS — C91.01 ACUTE LYMPHOBLASTIC LEUKEMIA (ALL) IN REMISSION (H): ICD-10-CM

## 2021-01-27 DIAGNOSIS — Z11.59 ENCOUNTER FOR SCREENING FOR OTHER VIRAL DISEASES: Primary | ICD-10-CM

## 2021-01-27 DIAGNOSIS — I45.6 WPW (WOLFF-PARKINSON-WHITE SYNDROME): ICD-10-CM

## 2021-01-27 DIAGNOSIS — C91.00 ACUTE LYMPHOBLASTIC LEUKEMIA (ALL) NOT HAVING ACHIEVED REMISSION (H): ICD-10-CM

## 2021-01-27 LAB
CMV IGG SERPL QL IA: 7.1 AI (ref 0–0.8)
EBV VCA IGG SER QL IA: >8 AI (ref 0–0.8)
HSV1 IGG SERPL QL IA: 4.6 AI (ref 0–0.8)
HSV2 IGG SERPL QL IA: 2.5 AI (ref 0–0.8)
HTLV I+II AB PATRN SER IB-IMP: NEGATIVE
INTERPRETATION ECG - MUSE: NORMAL
LAB SCANNED RESULT: NORMAL

## 2021-01-27 PROCEDURE — 96132 NRPSYC TST EVAL PHYS/QHP 1ST: CPT

## 2021-01-27 PROCEDURE — 96136 PSYCL/NRPSYC TST PHY/QHP 1ST: CPT | Mod: HN

## 2021-01-27 PROCEDURE — 96137 PSYCL/NRPSYC TST PHY/QHP EA: CPT | Mod: HN

## 2021-01-27 PROCEDURE — 96133 NRPSYC TST EVAL PHYS/QHP EA: CPT

## 2021-01-27 PROCEDURE — 93005 ELECTROCARDIOGRAM TRACING: CPT

## 2021-01-27 PROCEDURE — 99215 OFFICE O/P EST HI 40 MIN: CPT | Performed by: PEDIATRICS

## 2021-01-27 PROCEDURE — G0463 HOSPITAL OUTPT CLINIC VISIT: HCPCS | Mod: 25

## 2021-01-27 ASSESSMENT — MIFFLIN-ST. JEOR: SCORE: 1626.87

## 2021-01-27 NOTE — LETTER
Date:March 15, 2021      Provider requested that no letter be sent. Do not send.       [facility]

## 2021-01-27 NOTE — LETTER
2021      RE: Artemio Nino  [address]  [facility] [address]       2021      Janee Olmos PA-C  CTR PEDS BLOOD MARROW [place]  [address] [place] [address]    Name: Artemio Nino  MRN: [id number]  : 2000      Dear Dr. Olmos,    I was pleased to see 20 year old Artmeio Nino in Pediatric Cardiology Clinic at the [facility] on 21 for evaluation of cardiac status in preparation for his second BMT.  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  Artemio had only fainted once at that time, after receiving a shot.  He was evaluated by Pediatric Electrophysiology at [facility] with 3-day Holter and further study was deferred at that time.     He underwent the ifrst BMT due to very high risk pre-B ALL, CNS negative that had failure of induction chemotherapy to induce remission.  Chemotherapy was with [facility] [id number] [facility] treatment Group #1 - Arm A which included 4 doses of daunorubicin (44 mg each dose; 25mg/M2).     His conditioning protocol for BMT was  and included TBI days -4 through -1 followed by matched sibling donor transplant on 2018.  Per Dr. 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    Approximately 1.5 years after first BMT he relapsed and is requiring a second transplant for cure.  This will be a cord transplant.  He is currently in remission per recent BMT notes.    From a cardiac standpoint he has low normal energy but is able to walk for long distances.  He denies syncope or palpitations and is not short of breath.  He is on no cardiac meds.  He has had no known episodes of  "SVT.    There is remote family history (paternal grandfather) with a history of MI at age 48 years.    Past medical history is unremarkable except for   Patient Active Problem List   Diagnosis     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)     Aaron-Parkinson-White (WPW) syndrome     Bone marrow transplant candidate     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)     At risk for infection     S/P allogeneic bone marrow transplant (H)     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       Current medications include:  Current Outpatient Medications   Medication     VORICONAZOLE PO     No current facility-administered medications for this visit.        Current known allergies include:  Allergies   Allergen Reactions     Blood Transfusion Related (Informational Only) Other (See Comments)     Patient has a history of a clinically significant antibody against RBC antigens.  A delay in compatible RBCs may occur.Stem cell transplant patient.  Give type O RBCs.  Requires Benadryl and tylenol as premed for platelets and RBCs for hx of hives     Voriconazole Photosensitivity     Significant rash - do not rechallenge       Vital signs:  There were no vitals filed for this visit.  Growth percentile SmartLinks can only be used for patients less than 20 years old.  Wt Readings from Last 3 Encounters:   01/26/21 63.5 kg (139 lb 15.9 oz)   09/25/20 60.8 kg (134 lb 0.6 oz)   09/25/20 61 kg (134 lb 7.7 oz)     Ht Readings from Last 2 Encounters:   01/26/21 1.755 m (5' 9.09\")   09/25/20 1.75 m (5' 8.9\")     No height and weight on file for this encounter.    Physical Examination:  On physical exam today Artemio was an acyantoic young man in no distress.  Chest was clear to auscultation. Cardiac exam revealed normal first and second heart sounds.  The second heart sound was normal in intensity.  No murmur rub or gallop was heard. Hepatic edge was at the right costal margin.  " Pulses were 2+ in right upper and right lower extremities.    EKG  The EKG today showed normal sinus rhythm with WPW at a rate of 51 beats/minute.  NM interval was short at 98 msec with pre-excitation preent; QTc interval was normal at 405 msec.  QRS axis was normal at +87 degrees.  There were no ST-T wave changes present.    Cardiac Echo   Patient after bone marrow transplant. Normal echocardiogram. The left and right ventricles have normal chamber size, wall thickness, and systolic function. The calculated biplane left ventricular ejection fraction is 64 %. A catheter is seen with its tip in the right atrium. No pericardial effusion. Technically difficult study due to lung artifact.    In summary, Artemio has undergone previous chemotherapy and BMT with radiation. He has WPW on EKG.  Despite this he has normal cardiac function and no history of SVT.  I see no contraindications to proceeding with UCT. I did ask him to wear a leadless EKG monitor for 24 hours to verify absence of brieft runs of SVT.   It is my impression that we should follow NT-proBNP and troponin during the transplant periodically.  He also needs an updated lipid panel.  I have ordered all for a future blood draw.  Artemio  does not require SBE prophylaxis for dental or contaminated procedures.  Artemio may be allowed activity ad jose de jesus to his own limits.   I did recommend follow-up with an Echo in 6 months.    Thank you for allowing me to participate in Artemio's care.  If you have any questions or concerns, please feel free to contact me.    Sincerely,    Ju Plummer MD, PhD  Professor of Pediatrics  [phone]    Cc: Mr. Artemio Nino and his family    Parent(s) of Artemio Nino  [address] [facility] [address]

## 2021-01-27 NOTE — PATIENT INSTRUCTIONS
[facility] EXPLORE PEDIATRIC SPECIALTY CLINIC  EXPLORER CLINIC 12[facility]  [address] [facility] [address] [phone]      Cardiology Clinic   RN Care Coordinators, Maryan Addison (Bre) or Lanny Barrios  [phone]  Pediatric Call Center/Scheduling  [phone]    After Hours and Emergency Contact Number  [phone]  * Ask for the pediatric cardiologist on call         Prescription Renewals  The pharmacy must fax requests to [phone]  * Please allow 3-4 days for prescriptions to be authorized     Please return Zio in 24 hours  Normal Echo today  WPW on EKG  Return to clinic in 6 months

## 2021-01-27 NOTE — ADDENDUM NOTE
Addended by: MARC JERRY on: 1/27/2021 03:35 PM     Modules accepted: Orders     Dressing (No Sutures): dry sterile dressing

## 2021-01-27 NOTE — PROGRESS NOTES
PEDIATRIC BLOOD & MARROW TRANSPLANT SOCIAL WORK PSYCHOSOCIAL ASSESSMENT                         Date: 1/27/2021        Assessment of living situation, support system, financial status, functional status, coping abilities/strategies, stressors, need for resources and other social work interventions.        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 Viky Zavala     Nurse Coordinator: Daria Jara        Presenting Information:      Artemio is a 18 year old male patient with Acute Lymphoblastic Leukemia (ALL) who is pursuing curative treatment through a 2nd bone marrow transplant. Artemio will be admitted to the bone marrow transplant unit at the [facility] on Monday Feb 8th to begin his pre-transplant conditioning regimen. Isael had his first transplant at [facility], unfortunately relapsed, underwent Kymriah/CAR-T, and then had a second different T-CAR in [place]. Isael is in a deep remission at this time and his primary oncologist and primary BMT doctor feel that now is the best time to go forward with a second transplant as his best option to completely permanently cure his leukemia.          Special Considerations/Accomodations:      Patient's parents Dangelo and Mervat are  but amicable. They are both involved in Artemio's care as sources of support. Dangelo is remarried to his wife Camryn and Mervat has a significant other named Jada.  explained to Dangelo and Mervat that the care team will be unable to update multiple family members on a daily basis and recommended they come up with a system to update  each other.     Mervat will primarily be inpatient with Isael throughout hospitalization.     Patient's mom was reminded of behavior expectations on the unit related to some situations during previous admissions.    Expectations are as follows:    We don t allow yelling on the unit    We require that all parents speak in a respectful manner to all staff as we accord families that same respect.    Staff and provider work rooms are off limit to family members.    These are expectations we have for all families.    If these expectations are not maintained we will require a mandatory 24 hour leave from the unit for the individual not adhering to these expectations.     If it continues to happen we would require a meeting with members of the inpatient team to re-discuss expectations before allowing a parent to return.         Contact/Legal Info:      Decision Maker(s): Artemio is an adult and able to make his own medical decisions at this time. He wants all medical decisions to go through him and not his parents.     Special Custody Considerations: N/A     Advance Directive: Ash does have an advanced directive that lists both parents as health care agents. Most updated copy requested from  at children's [facility].     Permanent Address: [address] [place] [address] Lives with  paternal grandmother and paternal grandfather     Local Address: [address] [place] [address]   Staying locally with father and step mother until he is greater than 100 days post BMT.     Phone number(s):       Mom - Mervat: [phone]  Dad - Dangelo: [phone]  Step-Mom - Camryn: [phone]           Support System/Relationship Status/Family History: Artemio's parents and paternal grandparents are his primary support system. His parents, Mervat and Dangelo, are  and civil, but prefer to visit Artemio at different times and communicate directly with Artemio versus with each other as much as  possible.  Dangelo is remarried and his wife's name is Camryn. Artemio has given verbal permission for his parents and Camryn all to get any information they would like to know. He has also signed a person to person communication consent so that the care team can speak to Isael's parents if they have questions. Edwige explained to all of his parents that the medical team will not be able to update multiple family members per day and that they should try to update each other as much as possible.   Artemio also has two siblings, an older sister and brother. His sister Kamilah lives locally in [place], [place] is  and has one child. His brother Link lives in [place]. Mervat is now living in [place] much of the year with her significant other Jada.    Patient previously had been living in his patnernal grandparents basement. There were some concerns from various family members that this environment was not suitable for a post BMT patient. (reasoning was that it was damp and musty and it was unknown if mold and mildew were present. Fortunately, Artemio decided that going back to live at his grandparents house during post BMT follow up was not a good idea for several reasons, mainly because his grandparents have their own health issues and he didn't want to stress them out by living at their house while also going through his own health issues..  Artemio states he does not have a significant other at this time.        Unique Patient/Family Needs:  Establishing effective communication pattern with patient and his parents.     Spirituality/Lottie Affiliation: Patient does not identify with lottie community       Communication Preferences: Artemio wants all medical communication and decisions to go through him. He prefers direct to the point communication. He does not like to be bothered with examinations and questions if they're not necessary.           Caregiver Plan: Parents, Artemio's primary care givers will be  "Dangelo and step-mom Camryn since he'll be living at their house post hospitalization. Mervat may also assist in this process if they need assistance from her. SWer advised them to come up with a plan based on what was best for Artemio, what his preferences are, and what their family can manage.      Patient & Caregiver Knowledge of Medical Condition and Plan of Care: Artemio and his family are understanding of his medical condition and plan of care for BMT.     Patient and Caregiver Transplant Goals: Artemio and his parents state they \"Just want to get through it\".           Patient Personality/Communication/Coping/Interests/Activities: approachable, withdrawn and quiet. Patient states that when he doesn't feel good he irish by playing video games, watching movies or TV and sleeping. He also states he's not a morning person and prefers not to be bothered at night or if he's napping. He requested nursing bundle cares when possible. SWer explained that the nurses try to be as accommodating as possible to patient preferences but it is not always possible due to the large number tasks they need to complete every day with every patient.     Patient Education/Developmental Level: Patient graduated from [employer] approximately 3 years ago and had been taking some time off before deciding about college. He plans to revisit applying to higher education once he is done with the BMT process.           Logistical Considerations:  Transportation: Private Car  Parking: at this time parents state they can afford to buy monthly parking passes.  Housing: patient and family is local.  Mervat plans to be at the hospital the majority of the time and has several good friends [place] she can stay with if need be.   Financial:   Insurance:   Primary: [employer] [place], Camryn carries this insurance.  Secondary: [employer]  Unique Issues?: SWer provided in depth financial assessment. Family does not qualify for any " grants at this time due to income and asset status. Family prefers not to have specific details shared in the chart.     Patient/Caregiver Sources of Income/Employment: Patient is not employed at this time due to his frequent treatment needs related to his ALL.   Patient's Dad, Dangelo, owns his own commercial painting business. Since he's the owner he has some flexibility in his schedule and can be away from work regularly. Patient's step mom, Camryn, is a nurse and works at a local elementary school.           Patient/Family Psychosocial Considerations:     Mental Health: Patient's mom has been assessed to struggle with anxiety around patient's medical situation.        Chemical Health: No issues identified       Trauma/Loss/Abuse History: No identified issues       Legal Issues: No issues identified           Clinical Assessment and Recommendations:      Patient and family present as well-informed about and prepared for the treatment process. I did not identify any significant barriers to them managing the demands of treatment.        Concerns: none identified at this time.     Education Provided: Transplant process expectations, Caregiver requirements, Caregiver self-care, Financial issues related to transplant, Financial resources/grants available, Common psychosocial stressors pre/post transplant, Hopsital resources available and Social work role       Interventions Provided:   Education and counseling related to psychosocial issues and resources     Follow up planned:  Psychosocial support      REJI Barnes, [facility]    Pediatric Blood and Marrow Transplant  [phone]  [email or web address]     1/29/2021  2:18 PM

## 2021-01-27 NOTE — LETTER
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 Artemio was seen for a neuropsychological evaluation in the Pediatric Neuropsychology Clinic as part of his 1-year post-BMT follow-up.      Relevant History: Background information was gathered via interview with Artemio and review of available records.      Medical History: Prior to Artemio s cancer diagnosis, his medical history was largely uncomplicated. At age 14, he sustained a left comminuted and shortened mid clavicular fracture. He underwent left clavicle open reduction and internal fixation with a plate. At age 16, he sustained a closed stress fracture of his left fibula after soccer playing. He had no other major surgeries, had never been hospitalized, and never received blood transfusion. Family medical history is significant for heparin-induced thrombocytopenia and prostate cancer.     At age 17, on 2/5/18, Artemio presented at [facility] with fever, syncope, sore joints, and difficulty moving his right arm for the past two weeks without known injury. Bloodwork indicated B-Cell blasts, and he was referred for an Hematology/Oncology Consultation at [facility]. Testing completed at [facility] on 2/13/19 indicated acute lymphoblastic leukemia (ALL). His cytogenetic analysis showed IKZF1 deletion, which suggests Ph-like ALL. Molecular evaluation revelated a translocation indicative of JAK2 activation.      Between 2/15/18 - 3/15/18, Artemio received  bone marrow biopsy and lumbar puncture at [facility]. Intrathecal chemotherapy (i.e., [id number] and [id number] study) was initiated. 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 His end of induction marrow on 3/15/18 showed 31% blasts per [place] lab, 8% by morphology and was determined have shown induction failure.      He was subsequently removed from study. Artemio s primary oncologist at [facility], Dr. Paniagua, referred him to Dr. Zavala at [facility] for bone marrow transplant. Artemio received four days of total body irradiation (TBI) conditioning followed by matched sibling donor transplant on 11/2/18. He engrafted, with no signs of GVHD. Day +15 bone marrow biopsy showed 20-30% cellularity with trilineage hematopoesis, flow negative, 100% donor. He is scheduled to have his next bone marrow biopsy and donor studies at 1 year and 2 years post-transplant. He continues to be prescribed ruxolitinib (Jakafi) and Sulfamethoxazole (Bactrim).      Fortunately, disease and treatment associated complications have been fairly minimal. No known organ compromise, no remarkable infectious history, and no significant history nor perceived bleeding susceptibility. He required one transfusion of platelets. Artemio experienced intermittent nausea, low energy and appetite throughout treatment, but all side-effects have resolved. He has recently been feeling well with good energy and appetite; he is working full-time and playing sports again. Artemio reports concern about his hearing in his right ear, and plans to discuss this at his next oncology appointment in two weeks.      Family/Psychosocial History: Artemio  lives with his paternal grandparents in [place], [place]. Artemio s father and step-mother live nearby, and cared for him for several months after he was discharged from his cancer treatment. His mother is involved in his care as well, but currently lives out of state. Artemio also has an older brother and sister. He enjoys socializing with family and friends, playing soccer in an adult league, and playing video games. Artemio reports that he has felt physically healthy for the past several months, and also indicated no mental health or cognitive concerns.      School and Work History: Early developmental history was normal. Artemio finished high school and one semester of college at which point he decided to leave college and begin working. He initially worked as a  with his father occasionally. 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 Artemio enjoys his work and does not plan to return to post-secondary education.      Behavioral Observations: Artemio was seen for one day of testing and presented to the appointment alone. He presented as an appropriately dressed, well-groomed individual appeared his chronological age. Artemio happily greeted the examiner and engaged in casual conversation throughout the evaluation. He made appropriate eye contact with the examiner, and readily engaged the examiner in reciprocal conversation and rapport was quickly established. His emotional expressiveness was appropriate, and throughout the assessment he was upbeat. Language comprehension appeared normal. Speech in terms of tone, prosody, rate, and grammatical structure was normal. No fine or gross motor abnormalities were observed through testing. Artemio used his right hand for writing. His hearing and vision were appropriate for testing purposes.      During testing, Artemio was hard-working and cooperative, with no notable distractibility, typical activity level, and mild  impulsivity. On timed items, Artemio often answered quickly without carefully considering all of the potential choices for a question. He occasionally self-corrected incorrect responses. During testing, he stayed on task and required few redirections.      Overall, Artemio was very pleasant, effortful, and took the work seriously. Given his compliant responding, the following test results are thought to be a valid representation of Artemio s current level of functioning in an optimal (i.e., quiet, one-on-one) environment.     Neuropsychological Evaluation Methods and Instruments  Review of Records  Clinical behavioral observations  Clinical Interview  Wechsler Adult Intelligence Scale, 4th Ed.  Test of Variables of Attention, Visual  Malou-Renae Executive Function System              Color-Word Interference              Verbal Fluency              Roy Making Test  Purdue Pegboard  Drake-Osterrieth Complex Figure Drawing Test  [place] Verbal Learning Test, Third Edition   Behavior Rating Inventory of Executive Function - Adult Version  Behavioral Assessment System for Children, 3rd Edition, Self-Report Form  A full summary of test scores is provided in tables at the end of this report.     Impressions and Recommendations: It is important to consider the findings of the evaluation within the context of Artemio s complex medical history. Acute Lymphoblastic Leukemia (ALL) is a type of cancer where bone marrow makes too many immature lymphocytes (white blood cells). Immature lymphocytes do not fight infection well and as the number of cells increases there is less room for healthy cells which can lead to infection, anemia, and easy bleeding. Artemio underwent bone marrow transplant (BMT) as treatment for ALL. This places him at risk because the chemotherapy regimen and total body irradiation to prepare for BMT can be associated with late effects that occur months or years after treatment has ended, which can  affect growth and development, emotional and behavioral functioning, and neurocognitive abilities (e.g., fine motor, attentional, and executive functioning difficulties).      Artemio s evaluation indicated that he is functioning within the broadly average range across all assessed domains. His intellectual functioning is average. Artemio s sustained attention was also average on testing. Highly related to attention is executive functioning. Broadly, executive functions are the skills necessary to regulate cognition and behavior. These skills include cognitive flexibility, the ability to plan, inhibit impulses, generate new information or solutions, and hold information in working memory. On a questionnaire assessing Artemio s executive skills, he indicated no concerns. On measures directly examining his executive skills, Artemio s performance was consistently average. While memory is sometimes affected by cancer treatment, Artemio s verbal and visual memory was average on testing. Similarly, although research has shown that radiation therapy, particularly in young children, has adverse effects on fine-motor skills and visual-motor integration, Artemio did not show prominent deficits in these areas. Together, results indicate that Artemio is capable of pursuing and striving in further education or occupational training, if he chooses to do so.      Finally, Artemio s responses on a questionnaire assessing his emotional and behavioral functioning indicated no pressing concerns. His response did indicate poor attitude towards school and teacher. As Artemio is not currently in school, but answered the questions with respect to how he felt when previously in school, these measures are both irrelevant and not necessarily reliable. Overall, results indicate that Artemio is a hard-working, amiable, and resilient young man. His neurocognitive functioning should continue to be regularly monitored, particularly if  concerns arise. It is possible for years to pass before cognitive effects of cancer treatment emerge. Otherwise, in the absence of reported or observed cognitive deficits, there are no specific recommendations indicated at this time.     Diagnoses:  C91.01                        Acute lymphoblastic leukemia, in remission        It has been a pleasure working with Artemio. If you have any questions or concerns regarding this evaluation, please call at [phone].          John Singleton, PhD, LP        Smith Walsh MA                       Psychology Intern   Pediatric Neuropsychologist              PEDIATRIC NEUROPSYCHOLOGY CLINIC TEST SCORES      Note: These scores are intended for appropriately licensed professionals and should never be interpreted without consideration of the attached narrative report.     The test data listed below use one or more of the following formats:       Standard Scores have an average of 100 and a standard deviation of 15 (the average range is 85 to 115).   Scaled Scores have an average of 10 and a standard deviation of 3 (the average range is 7 to 13).   T-Scores have an average of 50 and a standard deviation of 10 (the average range is 40 to 60).   Z-Scores have an average of 0 and a standard deviation of 1 (the average range is -1 to +1).       COGNITIVE FUNCTIONING      Wechsler Adult Intelligence Scale, Fourth Edition   Standard scores from 85 - 115 represent the average range of functioning.  Scaled scores from 7 - 13 represent the average range of functioning.     Index Standard Score   Verbal Comprehension 102   Perceptual Reasoning 107   Working Memory 92   Processing Speed 97   Full Scale       Subtest Scaled Score   Similarities 10   Vocabulary 10   Information 11   Block Design 13   Matrix Reasoning 8   Visual Puzzles 13   Digit Span    10   Arithmetic   7   Symbol Search 10   Coding 9                  ATTENTION AND EXECUTIVE FUNCTIONING    Test of Variables of Attention, Visual  Scores from 85 - 115 represent the average range of functioning.          Measure  Quarter 1  Quarter 2  Quarter 3  Quarter 4  Total    Omissions  102  102 105 107  108   Commissions  114  112 120 118  121   Response Time  97 93 115 114 110   Variability  80  106 123 115 103        Malou-Renae Executive Function System Color-Word Interference Test  Scaled Scores from 7 - 13 represent the average range of functioning.     Measure Scaled Score   Color Naming 11   Word Reading 7   Inhibition 12   Inhibition/Switching 8      Malou-Renae Executive Function System Verbal Fluency Test  Scaled Scores from 7 - 13 represent the average range of functioning.     Measure Scaled Score   Letter Fluency 11   Category Fluency 13   Category Switching Total Correct 8   Category Switching Total Switching Accuracy 8      Error Scaled Score   Percent Set-Loss Error 11   Percent Repetition Error 10   Category Switching Percent Switching Accuracy 9      Malou-Renae Executive Function System Trail Making Test  Scaled Scores from 7 - 13 represent the average range of functioning.     Measure Scaled Score   Visual Scanning 13   Number Sequencing 13   Letter Sequencing 13   Number-Letter Switching 10   Motor Speed    12         Behavior Rating Inventory of Executive Function - Adult Version  T-scores 65 and higher are considered to be in the  clinically significant  range.     Index/Scale T-Score   Inhibit 46   Shift 51   Emotional Control 43   Self-Monitor  Behavioral Regulation Index 59  48   Initiate 47   Working Memory 39   Plan/Organize  Task-Monitor 44  36   Organization of Materials 45   Metacognition Index 41   Global Executive Composite 44         MEMORY/ORIENTATION FUNCTIONING      [place] Verbal Learning Test, Third Edition   Standard scores from 85 - 115 represent the average range of functioning.  Scaled scores from 7 - 13 represent the average range of functioning     Index                            Raw Score        Standard Score  Total                                   43                    90                            Immediate Recall        Raw Score      Scaled Score  Trial 1 Correct                    5                       8  Trial 2 Correct                                8                       9  Trial 3 Correct                   10                      9  Trial 4 Correct                    7                       5  Trial 5 Correct                               12                      9                            Delayed Recall                        Raw Score      Scaled Score  Short Delay Free                 10                                9  Short Delay Cued               11                                9  Long Delay Free                 12                               10  Long Delay Cued                11                                8                              Yes/No Recognition    Raw Score      Scaled Score  Total Hits                             14                       7  Total False Positives*          1                        9  Discriminability                     3                        9  Nonparametric                                94                       9  Total Intrusions*                   1                       12  *A lower raw score is better              FINE-MOTOR AND VISUAL-MOTOR FUNCTIONING      Purdue Pegboard   Standard scores from 85 - 115 represent the average range of functioning.       Trial  Pegs Placed  Standard Score   Dominant (R)  14  87   Non-Dominant   14 92   Both Hands  11 pairs   75      Drake-Osterrieth Complex Figure Drawing Test  Standard scores from 85 - 115 define the average range of functioning.     Task Standard Score   Copy 98   Delay 95               EMOTIONAL AND BEHAVIORAL FUNCTIONING   For the Clinical Scales on the BASC-3, scores ranging from 60-69 are considered to be in the  at-risk  range and scores of 70 or  higher are considered  clinically significant.  For the Adaptive Scales, scores between 30 and 39 are considered to be in the  at-risk  range and scores of 29 or lower are considered  clinically significant.        Behavioral Assessment System for Children, 3rd Edition, Self-Report Form     Clinical Scales T-Score   Adaptive Scales T-Score   Attitude to School 107   Relations with Parents 44   Attitude to Teachers 73   Interpersonal Relations 52   Sensation Seeking 54   Self-Esteem 61   Atypicality 43   Self-Reliance 54   Locus of Control 45         Social Stress 48   Composite Indices     Anxiety 41   School Problems 87   Depression 59   Internalizing Problems 45   Sense of Inadequacy 46   Inattention/Hyperactivity 39   Somatization 40   Emotional Symptoms Index 45   Attention Problems 34   Personal Adjustment 54   Hyperactivity 47              Attestation:  Neuropsych testing was administered on 1/27/2021, by Smith Walsh MA, under my direct supervision. Total time spent in test administration and scoring by Clinical Trainee was 30 minutes (61071) and 4.5 hours (27083).  Attestation: 1 hour professional time, including interview, record review, data integration and report writing (99500); 4 hours additional professional time, including interview, record review, data integration and report writing ([id number]).     No letter      John Singleton, PhD LP

## 2021-01-27 NOTE — LETTER
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 Artemio was seen for a neuropsychological evaluation in the Pediatric Neuropsychology Clinic as part of his 3-year post-BMT follow-up.      Artemio was previously seen in our clinic in October of 2019. Results revealed broadly average functioning across domains. He demonstrated average overall intellectual functioning, attention and executive function, verbal and visual memory, visual-motor integration, and fine motor skills. No concerns regarding Artemio s overall emotional and behavioral well-being and adjustment were noted.     Updated History: Updated information was gathered via an interview with Artemio, review of available records, and parent and self-report questionnaires. Interested readers are directed to Artemio s medical records and previous neuropsychological evaluation report dated 10/29/2019 for complete family, medical, and developmental history.     As a review, prior to Artemio s cancer diagnosis, his medical history was largely uncomplicated. Family medical history is significant for heparin-induced thrombocytopenia and prostate cancer. Artemio was diagnosed with acute lymphoblastic leukemia (ALL) in February of 2019. Cytogenetic analysis showed IKZF1 deletion, which suggests Ph-like ALL. Molecular evaluation revelated a translocation indicative of JAK2 activation. Between 2/15/18 - 3/15/18, Artemio received bone marrow biopsy and lumbar puncture at  [facility]. Intrathecal chemotherapy (i.e., QULH4469 and PFMH7180 study) was initiated. Chemotherapy agents included Methotrexate, Cyclophospamide/Etoposide, and Ruxolitinib. 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 His end of induction marrow on 3/15/18 showed 31% blasts per [place] lab, 8% by morphology and was determined have shown induction failure. Artemio received four days of total body irradiation (TBI) conditioning followed by matched sibling donor transplant on 11/2/18. He engrafted, with no signs of GVHD. Day +15 bone marrow biopsy showed 20-30% cellularity with trilineage hematopoesis, flow negative, 100% donor.     Head CT on 8/7/20 indicated no intracranial hemorrhage, mass effect, or midline shift. Gray/white matter differentiation in both cerebral hemispheres preserved. Ventricles proportionate to the cerebral sulci. Basal cisterns clear. Mild hypoattenuation within the periventricular and subcortical white matter within both cerebral hemispheres (nonspecific), which may represent posttreatment changes.     Following marrow relapse in June of 2020, Artemio completed maintenance chemotherapy and underwent lymphodepletion followed by CD19 chimeric antigen receptor T-cell (CAR-T) infusion on 7/27/20. CAR-T therapy was complicated by Grade III cytokine release syndrome (CRS), a systemic inflammatory response that can occur following antibody-based therapies, such as CAR-T and can, in severe cases, cause neurotoxicity (damage to the brain). He was treated with Tociluzimab, dopamine pressor support, and steroids. He continues to have absolute CD19 B-cells <1%. He has shown no signs of CRS or neurotoxicity. He is currently in deep remission and is negative for minimal residual disease (MRD), suggesting favorable long-term outcomes. He is currently pursuing curative treatment through a second bone marrow transplant, and he will begin his  pre-transplant conditioning regimen on February 8th at the [facility]. Artemio is currently prescribed Valacyclovir and Pentamidine (monthly).    Regarding his current functioning, Artemio shared that he has felt healthy overall. He described low energy secondary to his ongoing treatment, sharing that he is unable engage in vigorous exercise like he used to, which is difficult for him. Currently, he enjoys playing video games and talking to his friends. He described his appetite as normal and shared that he does not experience any pain. He sleeps 7 to 10 hours per night and has no sleep difficulties. He denied any changes in his neurocognitive abilities (i.e., attention/executive function, memory, motor functions). He described his overall mood as  good , and he denied symptoms of depression and anxiety. He shared that his mood is stable and  even keel  most of the time. Artemio denied self-harm and suicidal ideation. He also denied substance use.     Artemio continues to live with his paternal grandparents in [place], [place]. He shared that his long-term goal is to become financial independent and move abroad. He would like to work independently as he has an entrepreneurial mindset. Artemio finished high school and one semester of college at which point he decided to leave college and begin working. 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 However, he has not worked since that time and is not currently working due to his ongoing treatment. Regarding his social supports, Artemio shared he has a number of close friends he feels are supportive. He shared that he would like to be dating, although his current medical challenges (i.e., being immunosuppressed, changes to his physical appearance) and current COVID-19 restrictions interfere with actively dating. He shared that because he has undergone treatment in the past, his treatment feels  easier  this  time. However, he also shared that his stress level is higher due to his elevated risk of adverse outcomes. However, Artemio shared that he has positive outlook and is optimistic about his treatment, and he feels he is coping well.    Behavioral Observations   Artemio was seen for one day of testing while being accompanied to the appointment by his father. He was casually dressed and appropriately groomed. Vision and hearing seemed adequate for testing purposes. Artemio presented as a polite and talkative young man. He readily engaged in conversation throughout the testing session and demonstrated good back-and-forth social communication. He spoke using a normal rate, rhythm, tone, and prosody of speech while engaging in adequate eye-contact. His overall attitude was neutral while his mood matched the setting, and his affect was appropriate in range and intensity. No unusual motor mannerisms or repetitive behaviors were observed. Artemio appeared to have adequate language comprehension as task instructions were quickly understood. He appeared alert and oriented to his surroundings. Thought processes and content were both normal and organized. No distorted perceptions were observed. Insight and judgement both appeared intact and not impaired. Engagement throughout the evaluation was generally strong as Artemio was attentive and appeared to not hastily ryan through tasks.    Of note, the current evaluation was conducted during the COVID-19 pandemic. As such, the necessary personal protective equipment (PPE) was required to be worn throughout the evaluation. The examiner(s) wore a face mask, shield, and gloves, and Artemio wore a face mask. Safety procedures including but not limited to the use of PPE may result in increased distraction, anxiety and a diminished capacity for the patient and the examiner(s) to read nonverbal cues. Testing conditions with PPE are not consistent with the usual and customary process  of evaluation. Nonetheless, Artemio appeared to put forth his best effort, and the PPE worn did not appear to be of great interference. As such, under these circumstances, the results of this evaluation are considered a valid and accurate reflection of Artemio s current level of functioning at this time.      NEUROPSYCHOLOGICAL ASSESSMENT  Neuropsychological Evaluation Methods and Instruments  Review of Records  Clinical behavioral observations  Wechsler Adult Intelligence Scale, 4th Ed.  Test of Variables of Attention - Visual  Malou-Renae Executive Function System              Trail Making Test              Color-Word Interference  Purdue Pegboard  Drake Complex Figure Test and Recognition Trial   Wechsler Memory Scales, 4th Ed.   Logical Memory I & II   Visual Reproduction I & II  Behavior Rating Inventory of Executive Functioning, Adult Version - Informant Report   Behavior Assessment System for Children, 3rd Ed., Self-Report of Personality - College  A full summary of test scores is provided in tables at the end of this report.    IMPRESSIONS  Results of the current neuropsychological evaluation revealed a resilient young man with a wide variety of strengths. In the context of Artemio s complex medical history, he shows continued preserved skills across domains of neurocognitive functioning. Acute Lymphoblastic Leukemia (ALL) is a type of cancer in which the bone marrow produces immature lymphocytes (a type of white blood cell). The accumulation of these immature lymphocytes results in a loss of red and white blood cells, which can lead to anemia, reduced oxygen availability, and reduced immune functioning and increased risk of infection. Low platelet levels can lead to easy bleeding and bruising (thrombocytopenia). Artemio underwent bone marrow transplant (BMT) as treatment for ALL. His treatment has included total body irradiation as well as multiple courses of chemotherapy, and he plans to complete a  second bone marrow transplant with the goal of permanently curing his ALL. These treatment protocols can be associated with late effects that can occur months or years after treatment has ended, which can affect growth and development, emotional and behavioral functioning, and neurocognitive abilities (e.g., fine motor, attentional, and executive functioning difficulties, memory functioning, visual-spatial processing).    Artemio s evaluation indicated that he continues to function within the broadly average range across a majority of domains assessed, which is consistent with previous testing in 2019. These results are encouraging in the context of his risk for neurocognitive challenges. His intellectual functioning is average across domains. Artemio s executive functioning skills were also assessed. Broadly, executive functions are the skills necessary to regulate thoughts, behaviors, and emotions. These skills include impulse control, recognizing how behavior comes across to others, adjusting behavior or emotional expression in anticipation of contextual demands, getting started on activities and following through to completion, problem-solving, cognitive flexibility (i.e., thinking flexibly or adapting to changes), and emotional control. On a questionnaire assessing Artemio s executive function skills in everyday activities, his father s ratings indicated no concerns. Similarly, Artemio s performance across several executive function measures indicated average to high average performance. He showed a mild relative weakness in efficiently organizing, encoding, and retrieving complex visual information, performing slightly below average. However, his recognition of this information when given cues, as well as ability to learn and spontaneously recall visual and verbal information requiring a lower degree of organization, was average to high average, suggesting he does not show deficit in memory functioning.  Research suggests that challenges in efficient organization of complex information is common in individuals with a history of cancer treatments such as chemotherapy and radiation. Artemio s slight relative weakness in this domain may reflect subtle brain changes in white matter pathways (i.e., pathways through which different brain regions communicate with one another) indicated on his recent neuroimaging. However, this area of relative weakness is very mild considering Artemio s constellation of strengths, and he is likely to find effective strategies to compensate for this slight weakness. Results of fine motor testing indicated average performance, which is also often affected by cancer treatments. Together, results indicated that Artemio continues to show a wide variety of strengths, and he is highly capable of pursuing his occupational and avocational goals.       Finally, Artemio s responses on a questionnaire assessing his emotional and behavioral functioning indicated no elevated concerns. His responses did indicate poor adjustment to school. However, Artemio is not currently in school, and it is likely he answered these questions with respect to how he felt when previously in school (similar to his responses during his previous evaluation), suggesting these questions are currently irrelevant to his functioning. Overall, results indicate the Artemio is a resilient, friendly, and insightful young man. Given his medical history and ongoing treatment, his neurocognitive functioning should be regularly monitored, particularly if concerns arise. It is important to consider that individuals with ALL can experience emerging cognitive difficulties years after completing treatment.     Diagnoses:  C91.01 Acute lymphoblastic leukemia, in remission     RECOMMENDATIONS:  Artemio has a mild relative weakness with organizing and remembering visual information. This mild weakness could interfere with his performance  on tasks requiring him to analyze visual and abstract information, such as interpreting graphs and completing mathematics problems. He may benefit from verbalizing his approach to such tasks; for example, describing the task demands and his approach in his own words out loud. He is also likely to benefit from strategies to aid in his ability to process complex visual information, which could include using blank paper to block out irrelevant visual stimuli and intentionally direct his attention.     We would like to see Artemio again in our clinic in approximately 1 year to monitor his functioning in the context of his ongoing medical treatment.     We hope that our evaluation of Artemio assists you with the planning of his treatment. If you have any questions or comments please feel free to contact us at [phone].         Smith Segovia MA  Psychology Intern  Department of Pediatrics  Division of Clinical Behavioral Neuroscience   [facility]     John Singleton, Ph.D., L.P.     Department of Pediatrics  Director, Division of Clinical Behavioral Neuroscience  [facility]                PEDIATRIC NEUROPSYCHOLOGY CLINIC  CONFIDENTIAL TEST SCORES    Note: These scores are intended for appropriately licensed professionals and should never be interpreted without consideration of the attached narrative report.     Test Results:  Note: The test data listed below use one or more of the following formats:   *Standard Scores have an average of 100 and a standard deviation of 15 (the average range is 85 to 115).  *Scaled Scores have an average of 10 and a standard deviation of 3 (the average range is 7 to 13).   *T-Scores have an average range of 50 and a standard deviation of 10 (the average range is 40 to 60).   *Z-Scores have an average of 0 and a standard deviation of 1 (the average range is -1 to 1).      COGNITIVE FUNCTIONING    Wechsler Adult Intelligence Scale, Fourth  Edition   Standard scores from 85 - 115 represent the average range of functioning.  Scaled scores from 7 - 13 represent the average range of functioning.     Index Standard Score   Verbal Comprehension 105   Perceptual Reasoning 107   Working Memory 102   Processing Speed 97   Full Scale       Subtest Scaled Score   Similarities 10   Vocabulary 11   Information 12   Block Design 13   Visual Puzzles 10   Matrix Reasoning 11   Digit Span 11   Arithmetic 10   Coding 9   Symbol Search 10     ATTENTION AND EXECUTIVE FUNCTIONING    Malou-Renae Executive Function System Color-Word Interference Test  Scaled Scores from 7 - 13 represent the average range of functioning.     Measure Standard Score   Color Naming 11   Word Reading 9   Inhibition 11   Inhibition/Switching 10        Malou-Renae Executive Function System Trail Making Test  Scaled Scores from 7 - 13 represent the average range of functioning.     Measure Scaled Score   Visual Scanning 13   Number Sequencing 13   Letter Sequencing 14   Number-Letter Switching 12   Motor Speed 9      Behavior Rating Inventory of Executive Function, Adult Version - Informant Report  T-scores 65 and higher are considered to be in the  clinically significant  range.     Index/Scale Parent T-Score   Inhibit 42   Shift 38   Emotional Control 39   Self-Monitor 37   Behavioral Regulation Index (ARON) 37   Initiate 39   Working Memory 39   Plan/Organize 38   Task-Monitor 38   Organization of Materials 37   Metacognitive Index (MCI) 36   Global Executive Composite (GEC) 36     FINE-MOTOR AND VISUAL-MOTOR FUNCTIONING    Purdue Pegboard  Standard scores from 85 - 115 represent the average range of functioning.     Trial Pegs Placed Standard Score   Dominant (R) 15 96   Non-Dominant 15 99   Both Hands 13 pairs 100     MEMORY/ORIENTATION FUNCTIONING     Wechsler Memory Scale, Fourth Edition  Scaled scores from 7 - 13 represent the average range of functioning. Percentiles 16-84 represent  the average range of functioning.     Subtest Scaled Score Cumulative Percentile   Logical Memory I 9    Logical Memory II 10    Logical Memory Recognition - 26-50   Visual Reproduction I 14    Visual Reproduction II 10    Visual Reproduction Recognition  - 17-25     Drake Complex Figure Test and Recognition Trial   T-scores from 40 - 60 define the average range of functioning. Percentiles 16-84 represent the average range of functioning.     Task Raw T-Score Percentile    Copy 33 - 11-16   Immediate Recall 20 39    Delayed Recall 17.5 32    Recognition 22 52      EMOTIONAL AND BEHAVIORAL FUNCTIONING  For the Clinical Scales on the BASC-3, scores ranging from 60-69 are considered to be in the  at-risk  range and scores of 70 or higher are considered  clinically significant.   For the Adaptive Scales, scores between 30 and 39 are considered to be in the  at-risk  range and scores of 29 or lower are considered  clinically significant.       Behavioral Assessment System for Children, Third Edition, Self-Report of Personality - [place]    Clinical Scales T-Score  Adaptive Scales T-score   Atypicality 45   Relations with Parents 55   Locus of Control 48   Interpersonal Relations 43   Social Stress 53   Self-Esteem 61   Anxiety 36   Self-Reliance 57   Depression 57        Sense of Inadequacy  45   Composite Indices    Somatization 50   Internalizing Problems 47   Attention Problems 49   Inattention/Hyperactivity 47   Hyperactivity 46   Emotional Symptoms Index 44   Sensation Seeking 53   Personal Adjustment 55   Alcohol Abuse 43       Social Maladjustment   65*        *At-risk         Attestation:  Neuropsych testing was administered on 1/27/2021, by Smith Walsh MA, under my direct supervision. Total time spent in test administration and scoring by Clinical Trainee was 30 minutes (34829) and 4.5 hours (18080).  Attestation: 1 hour professional time, including interview, record review, data integration and report writing  ([id number]); 4 hours additional professional time, including interview, record review, data integration and report writing ([id number]).       John Singleton, PhD LP

## 2021-01-27 NOTE — LETTER
1/27/2021      RE: Artemio Nino  [address] 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 Artemio was seen for a neuropsychological evaluation in the Pediatric Neuropsychology Clinic as part of his 3-year post-BMT follow-up.      Artemio was previously seen in our clinic in October of 2019. Results revealed broadly average functioning across domains. He demonstrated average overall intellectual functioning, attention and executive function, verbal and visual memory, visual-motor integration, and fine motor skills. No concerns regarding Artemio s overall emotional and behavioral well-being and adjustment were noted.     Updated History: Updated information was gathered via an interview with Artemio, review of available records, and parent and self-report questionnaires. Interested readers are directed to Artemio s medical records and previous neuropsychological evaluation report dated 10/29/2019 for complete family, medical, and developmental history.     As a review, prior to Artemio s cancer diagnosis, his medical history was largely uncomplicated. Family medical history is significant for heparin-induced thrombocytopenia and prostate cancer. Artemio was diagnosed with acute lymphoblastic leukemia (ALL) in February of 2019. Cytogenetic analysis showed IKZF1 deletion, which suggests Ph-like ALL. Molecular evaluation revelated a translocation indicative of JAK2 activation. Between 2/15/18 - 3/15/18, Artemio received bone marrow biopsy and lumbar puncture at  [facility]. Intrathecal chemotherapy (i.e., [id number] and [id number] study) was initiated. Chemotherapy agents included Methotrexate, Cyclophospamide/Etoposide, and Ruxolitinib. 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 His end of induction marrow on 3/15/18 showed 31% blasts per [place] lab, 8% by morphology and was determined have shown induction failure. Artemio received four days of total body irradiation (TBI) conditioning followed by matched sibling donor transplant on 11/2/18. He engrafted, with no signs of GVHD. Day +15 bone marrow biopsy showed 20-30% cellularity with trilineage hematopoesis, flow negative, 100% donor.     Head CT on 8/7/20 indicated no intracranial hemorrhage, mass effect, or midline shift. Gray/white matter differentiation in both cerebral hemispheres preserved. Ventricles proportionate to the cerebral sulci. Basal cisterns clear. Mild hypoattenuation within the periventricular and subcortical white matter within both cerebral hemispheres (nonspecific), which may represent posttreatment changes.     Following marrow relapse in June of 2020, Artemio completed maintenance chemotherapy and underwent lymphodepletion followed by CD19 chimeric antigen receptor T-cell (CAR-T) infusion on 7/27/20. CAR-T therapy was complicated by Grade III cytokine release syndrome (CRS), a systemic inflammatory response that can occur following antibody-based therapies, such as CAR-T and can, in severe cases, cause neurotoxicity (damage to the brain). He was treated with Tociluzimab, dopamine pressor support, and steroids. He continues to have absolute CD19 B-cells <1%. He has shown no signs of CRS or neurotoxicity. He is currently in deep remission and is negative for minimal residual disease (MRD), suggesting favorable long-term outcomes. He is currently pursuing curative treatment through a second bone marrow transplant, and he will begin his  pre-transplant conditioning regimen on February 8th at the [facility]. Artemio is currently prescribed Valacyclovir and Pentamidine (monthly).    Regarding his current functioning, Artemio shared that he has felt healthy overall. He described low energy secondary to his ongoing treatment, sharing that he is unable engage in vigorous exercise like he used to, which is difficult for him. Currently, he enjoys playing video games and talking to his friends. He described his appetite as normal and shared that he does not experience any pain. He sleeps 7 to 10 hours per night and has no sleep difficulties. He denied any changes in his neurocognitive abilities (i.e., attention/executive function, memory, motor functions). He described his overall mood as  good , and he denied symptoms of depression and anxiety. He shared that his mood is stable and  even keel  most of the time. Artemio denied self-harm and suicidal ideation. He also denied substance use.     Artemio continues to live with his paternal grandparents in [place], [place]. He shared that his long-term goal is to become financial independent and move abroad. He would like to work independently as he has an entrepreneurial mindset. Artemio finished high school and one semester of college at which point he decided to leave college and begin working. 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 However, he has not worked since that time and is not currently working due to his ongoing treatment. Regarding his social supports, Artemio shared he has a number of close friends he feels are supportive. He shared that he would like to be dating, although his current medical challenges (i.e., being immunosuppressed, changes to his physical appearance) and current COVID-19 restrictions interfere with actively dating. He shared that because he has undergone treatment in the past, his treatment feels  easier  this  time. However, he also shared that his stress level is higher due to his elevated risk of adverse outcomes. However, Artemio shared that he has positive outlook and is optimistic about his treatment, and he feels he is coping well.    Behavioral Observations   Artemio was seen for one day of testing while being accompanied to the appointment by his father. He was casually dressed and appropriately groomed. Vision and hearing seemed adequate for testing purposes. Artemio presented as a polite and talkative young man. He readily engaged in conversation throughout the testing session and demonstrated good back-and-forth social communication. He spoke using a normal rate, rhythm, tone, and prosody of speech while engaging in adequate eye-contact. His overall attitude was neutral while his mood matched the setting, and his affect was appropriate in range and intensity. No unusual motor mannerisms or repetitive behaviors were observed. Artemio appeared to have adequate language comprehension as task instructions were quickly understood. He appeared alert and oriented to his surroundings. Thought processes and content were both normal and organized. No distorted perceptions were observed. Insight and judgement both appeared intact and not impaired. Engagement throughout the evaluation was generally strong as Artemio was attentive and appeared to not hastily ryan through tasks.    Of note, the current evaluation was conducted during the COVID-19 pandemic. As such, the necessary personal protective equipment (PPE) was required to be worn throughout the evaluation. The examiner(s) wore a face mask, shield, and gloves, and Artemio wore a face mask. Safety procedures including but not limited to the use of PPE may result in increased distraction, anxiety and a diminished capacity for the patient and the examiner(s) to read nonverbal cues. Testing conditions with PPE are not consistent with the usual and customary process  of evaluation. Nonetheless, Artemio appeared to put forth his best effort, and the PPE worn did not appear to be of great interference. As such, under these circumstances, the results of this evaluation are considered a valid and accurate reflection of Artemio s current level of functioning at this time.      NEUROPSYCHOLOGICAL ASSESSMENT  Neuropsychological Evaluation Methods and Instruments  Review of Records  Clinical behavioral observations  Wechsler Adult Intelligence Scale, 4th Ed.  Test of Variables of Attention - Visual  Malou-Renae Executive Function System              Trail Making Test              Color-Word Interference  Purdue Pegboard  Drake Complex Figure Test and Recognition Trial   Wechsler Memory Scales, 4th Ed.   Logical Memory I & II   Visual Reproduction I & II  Behavior Rating Inventory of Executive Functioning, Adult Version - Informant Report   Behavior Assessment System for Children, 3rd Ed., Self-Report of Personality - [employer]  A full summary of test scores is provided in tables at the end of this report.    IMPRESSIONS  Results of the current neuropsychological evaluation revealed a resilient young man with a wide variety of strengths. In the context of Artemio s complex medical history, he shows continued preserved skills across domains of neurocognitive functioning. Acute Lymphoblastic Leukemia (ALL) is a type of cancer in which the bone marrow produces immature lymphocytes (a type of white blood cell). The accumulation of these immature lymphocytes results in a loss of red and white blood cells, which can lead to anemia, reduced oxygen availability, and reduced immune functioning and increased risk of infection. Low platelet levels can lead to easy bleeding and bruising (thrombocytopenia). Artemio underwent bone marrow transplant (BMT) as treatment for ALL. His treatment has included total body irradiation as well as multiple courses of chemotherapy, and he plans to complete a  second bone marrow transplant with the goal of permanently curing his ALL. These treatment protocols can be associated with late effects that can occur months or years after treatment has ended, which can affect growth and development, emotional and behavioral functioning, and neurocognitive abilities (e.g., fine motor, attentional, and executive functioning difficulties, memory functioning, visual-spatial processing).    Artemio s evaluation indicated that he continues to function within the broadly average range across a majority of domains assessed, which is consistent with previous testing in 2019. These results are encouraging in the context of his risk for neurocognitive challenges. His intellectual functioning is average across domains. Artemio s executive functioning skills were also assessed. Broadly, executive functions are the skills necessary to regulate thoughts, behaviors, and emotions. These skills include impulse control, recognizing how behavior comes across to others, adjusting behavior or emotional expression in anticipation of contextual demands, getting started on activities and following through to completion, problem-solving, cognitive flexibility (i.e., thinking flexibly or adapting to changes), and emotional control. On a questionnaire assessing Artemio s executive function skills in everyday activities, his father s ratings indicated no concerns. Similarly, Artemio s performance across several executive function measures indicated average to high average performance. He showed a mild relative weakness in efficiently organizing, encoding, and retrieving complex visual information, performing slightly below average. However, his recognition of this information when given cues, as well as ability to learn and spontaneously recall visual and verbal information requiring a lower degree of organization, was average to high average, suggesting he does not show deficit in memory functioning.  Research suggests that challenges in efficient organization of complex information is common in individuals with a history of cancer treatments such as chemotherapy and radiation. Artemio s slight relative weakness in this domain may reflect subtle brain changes in white matter pathways (i.e., pathways through which different brain regions communicate with one another) indicated on his recent neuroimaging. However, this area of relative weakness is very mild considering Artemio s constellation of strengths, and he is likely to find effective strategies to compensate for this slight weakness. Results of fine motor testing indicated average performance, which is also often affected by cancer treatments. Together, results indicated that Artemio continues to show a wide variety of strengths, and he is highly capable of pursuing his occupational and avocational goals.       Finally, Artemio s responses on a questionnaire assessing his emotional and behavioral functioning indicated no elevated concerns. His responses did indicate poor adjustment to school. However, Artemio is not currently in school, and it is likely he answered these questions with respect to how he felt when previously in school (similar to his responses during his previous evaluation), suggesting these questions are currently irrelevant to his functioning. Overall, results indicate the Artemio is a resilient, friendly, and insightful young man. Given his medical history and ongoing treatment, his neurocognitive functioning should be regularly monitored, particularly if concerns arise. It is important to consider that individuals with ALL can experience emerging cognitive difficulties years after completing treatment.     Diagnoses:  C91.01 Acute lymphoblastic leukemia, in remission     RECOMMENDATIONS:  Artemio has a mild relative weakness with organizing and remembering visual information. This mild weakness could interfere with his performance  on tasks requiring him to analyze visual and abstract information, such as interpreting graphs and completing mathematics problems. He may benefit from verbalizing his approach to such tasks; for example, describing the task demands and his approach in his own words out loud. He is also likely to benefit from strategies to aid in his ability to process complex visual information, which could include using blank paper to block out irrelevant visual stimuli and intentionally direct his attention.     We would like to see Artemio again in our clinic in approximately 1 year to monitor his functioning in the context of his ongoing medical treatment.     We hope that our evaluation of Artemio assists you with the planning of his treatment. If you have any questions or comments please feel free to contact us at [phone].         Smith Segovia MA  Psychology Intern  Department of Pediatrics  Division of Clinical Behavioral Neuroscience   [facility]     John Singleton, Ph.D., L.P.     Department of Pediatrics  Director, Division of Clinical Behavioral Neuroscience  [facility]                PEDIATRIC NEUROPSYCHOLOGY CLINIC  CONFIDENTIAL TEST SCORES    Note: These scores are intended for appropriately licensed professionals and should never be interpreted without consideration of the attached narrative report.     Test Results:  Note: The test data listed below use one or more of the following formats:   *Standard Scores have an average of 100 and a standard deviation of 15 (the average range is 85 to 115).  *Scaled Scores have an average of 10 and a standard deviation of 3 (the average range is 7 to 13).   *T-Scores have an average range of 50 and a standard deviation of 10 (the average range is 40 to 60).   *Z-Scores have an average of 0 and a standard deviation of 1 (the average range is -1 to 1).      COGNITIVE FUNCTIONING    Wechsler Adult Intelligence Scale, Fourth  Edition   Standard scores from 85 - 115 represent the average range of functioning.  Scaled scores from 7 - 13 represent the average range of functioning.     Index Standard Score   Verbal Comprehension 105   Perceptual Reasoning 107   Working Memory 102   Processing Speed 97   Full Scale       Subtest Scaled Score   Similarities 10   Vocabulary 11   Information 12   Block Design 13   Visual Puzzles 10   Matrix Reasoning 11   Digit Span 11   Arithmetic 10   Coding 9   Symbol Search 10     ATTENTION AND EXECUTIVE FUNCTIONING    Malou-Renae Executive Function System Color-Word Interference Test  Scaled Scores from 7 - 13 represent the average range of functioning.     Measure Standard Score   Color Naming 11   Word Reading 9   Inhibition 11   Inhibition/Switching 10        Malou-Renae Executive Function System Trail Making Test  Scaled Scores from 7 - 13 represent the average range of functioning.     Measure Scaled Score   Visual Scanning 13   Number Sequencing 13   Letter Sequencing 14   Number-Letter Switching 12   Motor Speed 9      Behavior Rating Inventory of Executive Function, Adult Version - Informant Report  T-scores 65 and higher are considered to be in the  clinically significant  range.     Index/Scale Parent T-Score   Inhibit 42   Shift 38   Emotional Control 39   Self-Monitor 37   Behavioral Regulation Index (ARON) 37   Initiate 39   Working Memory 39   Plan/Organize 38   Task-Monitor 38   Organization of Materials 37   Metacognitive Index (MCI) 36   Global Executive Composite (GEC) 36     FINE-MOTOR AND VISUAL-MOTOR FUNCTIONING    Purdue Pegboard  Standard scores from 85 - 115 represent the average range of functioning.     Trial Pegs Placed Standard Score   Dominant (R) 15 96   Non-Dominant 15 99   Both Hands 13 pairs 100     MEMORY/ORIENTATION FUNCTIONING     Wechsler Memory Scale, Fourth Edition  Scaled scores from 7 - 13 represent the average range of functioning. Percentiles 16-84 represent  the average range of functioning.     Subtest Scaled Score Cumulative Percentile   Logical Memory I 9    Logical Memory II 10    Logical Memory Recognition - 26-50   Visual Reproduction I 14    Visual Reproduction II 10    Visual Reproduction Recognition  - 17-25     Drake Complex Figure Test and Recognition Trial   T-scores from 40 - 60 define the average range of functioning. Percentiles 16-84 represent the average range of functioning.     Task Raw T-Score Percentile    Copy 33 - 11-16   Immediate Recall 20 39    Delayed Recall 17.5 32    Recognition 22 52      EMOTIONAL AND BEHAVIORAL FUNCTIONING  For the Clinical Scales on the BASC-3, scores ranging from 60-69 are considered to be in the  at-risk  range and scores of 70 or higher are considered  clinically significant.   For the Adaptive Scales, scores between 30 and 39 are considered to be in the  at-risk  range and scores of 29 or lower are considered  clinically significant.       Behavioral Assessment System for Children, Third Edition, Self-Report of Personality - [place]    Clinical Scales T-Score  Adaptive Scales T-score   Atypicality 45   Relations with Parents 55   Locus of Control 48   Interpersonal Relations 43   Social Stress 53   Self-Esteem 61   Anxiety 36   Self-Reliance 57   Depression 57        Sense of Inadequacy  45   Composite Indices    Somatization 50   Internalizing Problems 47   Attention Problems 49   Inattention/Hyperactivity 47   Hyperactivity 46   Emotional Symptoms Index 44   Sensation Seeking 53   Personal Adjustment 55   Alcohol Abuse 43       Social Maladjustment   65*        *At-risk         Attestation:  Neuropsych testing was administered on 1/27/2021, by Smith Walsh MA, under my direct supervision. Total time spent in test administration and scoring by Clinical Trainee was 30 minutes (81401) and 4.5 hours (44953).  Attestation: 1 hour professional time, including interview, record review, data integration and report writing  (74075); 4 hours additional professional time, including interview, record review, data integration and report writing ([id number]).       John Singleton, PhD LP

## 2021-01-27 NOTE — PROGRESS NOTES
2021      Janee Olmos PA-C  [facility] PEDS BLOOD [facility]ARROW [place]  [address] [place] [address]    Name: Artemio Nino  MRN: [id number]  : 2000      Dear Dr. Olmos,    I was pleased to see 20 year old Artemio Nino in Pediatric Cardiology Clinic at the [facility] on 21 for evaluation of cardiac status in preparation for his second BMT.  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  Artemio had only fainted once at that time, after receiving a shot.  He was evaluated by Pediatric Electrophysiology at [facility] with 3-day Holter and further study was deferred at that time.     He underwent the ifrst BMT due to very high risk pre-B ALL, CNS negative that had failure of induction chemotherapy to induce remission.  Chemotherapy was with [facility] [place] [facility] [id number] [facility] treatment Group #1 - Arm A which included 4 doses of daunorubicin (44 mg each dose; 25mg/M2).     His conditioning protocol for BMT was  and included TBI days -4 through -1 followed by matched sibling donor transplant on 2018.  Per Dr. 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    Approximately 1.5 years after first BMT he relapsed and is requiring a second transplant for cure.  This will be a cord transplant.  He is currently in remission per recent BMT notes.    From a cardiac standpoint he has low normal energy but is able to walk for long distances.  He denies syncope or palpitations and is not short of breath.  He is on no cardiac meds.  He has had no known episodes of SVT.    There is remote family history (paternal grandfather) with a history of MI at age 48  "years.    Past medical history is unremarkable except for   Patient Active Problem List   Diagnosis     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)     Aaron-Parkinson-White (WPW) syndrome     Bone marrow transplant candidate     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)     At risk for infection     S/P allogeneic bone marrow transplant (H)     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       Current medications include:  Current Outpatient Medications   Medication     VORICONAZOLE PO     No current facility-administered medications for this visit.        Current known allergies include:  Allergies   Allergen Reactions     Blood Transfusion Related (Informational Only) Other (See Comments)     Patient has a history of a clinically significant antibody against RBC antigens.  A delay in compatible RBCs may occur.Stem cell transplant patient.  Give type O RBCs.  Requires Benadryl and tylenol as premed for platelets and RBCs for hx of hives     Voriconazole Photosensitivity     Significant rash - do not rechallenge       Vital signs:  There were no vitals filed for this visit.  Growth percentile SmartLinks can only be used for patients less than 20 years old.  Wt Readings from Last 3 Encounters:   01/26/21 63.5 kg (139 lb 15.9 oz)   09/25/20 60.8 kg (134 lb 0.6 oz)   09/25/20 61 kg (134 lb 7.7 oz)     Ht Readings from Last 2 Encounters:   01/26/21 1.755 m (5' 9.09\")   09/25/20 1.75 m (5' 8.9\")     No height and weight on file for this encounter.    Physical Examination:  On physical exam today Artemio was an acyantoic young man in no distress.  Chest was clear to auscultation. Cardiac exam revealed normal first and second heart sounds.  The second heart sound was normal in intensity.  No murmur rub or gallop was heard. Hepatic edge was at the right costal margin.  Pulses were 2+ in right upper and right lower extremities.    EKG  The EKG today showed normal " sinus rhythm with WPW at a rate of 51 beats/minute.  AZ interval was short at 98 msec with pre-excitation preent; QTc interval was normal at 405 msec.  QRS axis was normal at +87 degrees.  There were no ST-T wave changes present.    Cardiac Echo   Patient after bone marrow transplant. Normal echocardiogram. The left and right ventricles have normal chamber size, wall thickness, and systolic function. The calculated biplane left ventricular ejection fraction is 64 %. A catheter is seen with its tip in the right atrium. No pericardial effusion. Technically difficult study due to lung artifact.    In summary, Artemio has undergone previous chemotherapy and BMT with radiation. He has WPW on EKG.  Despite this he has normal cardiac function and no history of SVT.  I see no contraindications to proceeding with UCT. I did ask him to wear a leadless EKG monitor for 24 hours to verify absence of brieft runs of SVT.   It is my impression that we should follow NT-proBNP and troponin during the transplant periodically.  He also needs an updated lipid panel.  I have ordered all for a future blood draw.  Artemio  does not require SBE prophylaxis for dental or contaminated procedures.  Artemio may be allowed activity ad jose de jesus to his own limits.   I did recommend follow-up with an Echo in 6 months.    Thank you for allowing me to participate in Artemio's care.  If you have any questions or concerns, please feel free to contact me.    Sincerely,    Ju Plummer MD, PhD  Professor of Pediatrics  [phone]    Cc: Mr. Artemio Nino and his family

## 2021-01-27 NOTE — PROGRESS NOTES
BMT Calendar Review and Teaching     Artemio is a 20 year-old male with ALL  referred by Dr. Paniagua being treated per protocol 2013-09 NON TBI with a UCB donor.      Calendar reviewed on 1/26, discussing dates, times, and locations of workup appointments including the rationale behind each test/procedure/consultation, clarifying family would be contacted with any changes, asking to please call [facility] at [phone] if they are running late, and reiterating how to best reach a provider during and after business hours calling clinic or paging the BMT [id number] through the hospital  at [phone]         Met with patient and patient's father on 1/26 to address the following teaching points:     Important telephone numbers including [facility], [facility], BMT Triage, BMT Office, Patient Relations, and hospital  for after hours questions/concerns    Transplant Calendar    Primary transplant protocol (2013-09) and ancillary [2019-24 (cPTC drug exposure), 2018-05 database, , 2011-13 (unlicensed UCB), 1996-16 database,] consents    What to expect in the [facility] (outpatient) versus on Unit 4 (inpatient)    Medications including but not limited to myeloablative chemotherapy, immunosuppression, infection prophylaxis, and symptom management pharmacological regimens     Supportive care and available resources    Discharge criteria and after transplant anticipatory guidance     Referenced copy of work-up and sample transplant calendars, protocol and ancillary consents, consulting services, workup-binder, campus map, medication handouts, and business cards for relevant providers during teaching; anticipatory guidance, clarification, and additional information provided according to learner's needs and requests        Person(s) involved in teaching: Abram  Motivation Level:    Asks questions? Yes    Eager to learn? Yes    Cooperative? Yes    Receptive, willing/able to accept  information? Yes    Patient demonstrates understanding of the following:    rationale for work-up appointments/tests related to diagnosis and treatment plan    which situations necessitate calling provider and whom to contact, highlighting clinic telephone number and hours, how to page BMT fellow on-call afterhours, nurse coordinator and primary BMT physician contact information    conditioning regimen: Thiotepa, Fludaribine, Busulfan and ATG    transplant: premedications, signs/symptoms of DMSO toxicity and/or allergic reaction    supportive medications: antiemetics, narcotics, diuretics, antibacterial/antifungal/antiviral regimen, GCSF, immunosuppression (TAC, MMF)    supportive care: ADL's (e.g., daily bath/shower/linen change, TID oral care, physical activity getting up out of bed), therapies (e.g.., PT, OT, speech, integrative), transfusions, supplemental nutrition (i.e., enteral or TPN), infection prevention precautions, support services (e.g., social work, child family life, PACCT, spiritual health, school)    outpatient versus inpatient care considerations     important anniversary or BAN dates (i.e.., day +60, +100, +180 followed by annual appointments)    post-transplant and -hospital discharge information including caregiver requirements, N95 mask, infection prevention, weaning immunosuppression, GVHD, returning to school/, transitioning care to referring care team, immunization recommendations    Teaching concerns addressed:    reduce infection exposure pre-transplant to minimize risk of unforeseen delays    research participant provided Research Participant Information Sheet by Daphne Ding RN on 1/26 , which contains information regarding the risks of participating in a research study during the COVID-19 pandemic; receipt of said information sheet was confirmed by study personnel prior to the visit    COVID testing requirements, caregiver restrictions, and prevention strategies including  avoiding all non-essential travel and large crowds/gatherings, wearing masks when out in public, handwashing and applying  when necessary, following CDC advice https://www.cdc.gov/coronavirus/2019-ncov/index.html and travel guidelines https://www.cdc.gov/coronavirus/2019-ncov/travelers/index.html    patient and caregiver instructed on hand hygiene? Yes    Specific Concerns? No     Encouraged family to call with any additional questions/concerns or afterthoughts, reviewing who to contact for what, referring to providers' business cards, highlighting best way to reach designated nurse, physician, financial , , and     Plan to continue with workup as delineated by personalized calendar, anticipating exit conference on Friday 1/29 with Dr. Zavala followed by admission on 2/8 (line placement is scheduled for 2/8), conditioning starting 2/9, and BMT 2/ pending insurance approval

## 2021-01-27 NOTE — NURSING NOTE
"Chief Complaint   Patient presents with     RECHECK     History of WPW       /74 (BP Location: Right arm, Patient Position: Sitting, Cuff Size: Adult Regular)   Pulse 65   Resp 16   Ht 5' 9.09\" (175.5 cm)   Wt 139 lb 15.9 oz (63.5 kg)   SpO2 99%   BMI 20.62 kg/m      Ying Oro, EMT  January 27, 2021  "

## 2021-01-28 ENCOUNTER — HOSPITAL ENCOUNTER (OUTPATIENT)
Dept: GENERAL RADIOLOGY | Facility: CLINIC | Age: 21
End: 2021-01-28
Attending: PHYSICIAN ASSISTANT
Payer: COMMERCIAL

## 2021-01-28 ENCOUNTER — INFUSION THERAPY VISIT (OUTPATIENT)
Dept: INFUSION THERAPY | Facility: CLINIC | Age: 21
End: 2021-01-28
Attending: PEDIATRICS
Payer: COMMERCIAL

## 2021-01-28 ENCOUNTER — APPOINTMENT (OUTPATIENT)
Dept: RADIOLOGY | Facility: CLINIC | Age: 21
End: 2021-01-28
Attending: PHYSICIAN ASSISTANT
Payer: COMMERCIAL

## 2021-01-28 ENCOUNTER — HOSPITAL ENCOUNTER (OUTPATIENT)
Dept: NUCLEAR MEDICINE | Facility: CLINIC | Age: 21
Setting detail: NUCLEAR MEDICINE
Discharge: HOME | End: 2021-01-28
Attending: PEDIATRICS | Admitting: PEDIATRICS
Payer: COMMERCIAL

## 2021-01-28 DIAGNOSIS — C91.01 ACUTE LYMPHOBLASTIC LEUKEMIA (ALL) IN REMISSION (H): ICD-10-CM

## 2021-01-28 DIAGNOSIS — C91.00 LEUKEMIA, ACUTE LYMPHOID (H): ICD-10-CM

## 2021-01-28 DIAGNOSIS — Z94.81 STATUS POST BONE MARROW TRANSPLANT (H): ICD-10-CM

## 2021-01-28 LAB
CHOLEST SERPL-MCNC: 153 MG/DL
HDLC SERPL-MCNC: 43 MG/DL
LDLC SERPL CALC-MCNC: 81 MG/DL
NONHDLC SERPL-MCNC: 110 MG/DL
NT-PROBNP SERPL-MCNC: 49 PG/ML (ref 0–125)
SA1 CELL: NORMAL
SA1 COMMENTS: NORMAL
SA1 HI RISK ABY: NORMAL
SA1 MOD RISK ABY: NORMAL
SA1 TEST METHOD: NORMAL
SA2 CELL: NORMAL
SA2 COMMENTS: NORMAL
SA2 HI RISK ABY UA: NORMAL
SA2 MOD RISK ABY: NORMAL
SA2 TEST METHOD: NORMAL
SCR 1 TEST METHOD: NORMAL
SCR1 CELL: NORMAL
SCR1 COMMENTS: NORMAL
SCR1 RESULT: NORMAL
SCR2 CELL: NORMAL
SCR2 COMMENTS: NORMAL
SCR2 RESULT: NORMAL
SCR2 TEST METHOD: NORMAL
TRIGL SERPL-MCNC: 146 MG/DL
TROPONIN I SERPL-MCNC: <0.015 UG/L (ref 0–0.04)

## 2021-01-28 PROCEDURE — 250N000011 HC RX IP 250 OP 636: Performed by: PHYSICIAN ASSISTANT

## 2021-01-28 PROCEDURE — 250N000011 HC RX IP 250 OP 636

## 2021-01-28 PROCEDURE — 999N000130 HC STATISTIC PORT-A-CATH ACCESS/FLUSHING

## 2021-01-28 PROCEDURE — 78725 KIDNEY FUNCTION STUDY: CPT | Mod: 26 | Performed by: RADIOLOGY

## 2021-01-28 PROCEDURE — 80061 LIPID PANEL: CPT | Performed by: PEDIATRICS

## 2021-01-28 PROCEDURE — 343N000001 HC RX 343: Performed by: PEDIATRICS

## 2021-01-28 PROCEDURE — 83880 ASSAY OF NATRIURETIC PEPTIDE: CPT | Performed by: PEDIATRICS

## 2021-01-28 PROCEDURE — 36591 DRAW BLOOD OFF VENOUS DEVICE: CPT

## 2021-01-28 PROCEDURE — 78725 KIDNEY FUNCTION STUDY: CPT

## 2021-01-28 PROCEDURE — A9539 TC99M PENTETATE: HCPCS | Performed by: PEDIATRICS

## 2021-01-28 PROCEDURE — 84484 ASSAY OF TROPONIN QUANT: CPT | Performed by: PEDIATRICS

## 2021-01-28 RX ORDER — HEPARIN SODIUM,PORCINE 10 UNIT/ML
3-6 VIAL (ML) INTRAVENOUS
Status: DISCONTINUED | OUTPATIENT
Start: 2021-01-28 | End: 2021-01-28 | Stop reason: HOSPADM

## 2021-01-28 RX ORDER — HEPARIN SODIUM,PORCINE 10 UNIT/ML
VIAL (ML) INTRAVENOUS
Status: COMPLETED
Start: 2021-01-28 | End: 2021-01-28

## 2021-01-28 RX ORDER — HEPARIN SODIUM,PORCINE 10 UNIT/ML
5 VIAL (ML) INTRAVENOUS ONCE
Status: COMPLETED | OUTPATIENT
Start: 2021-01-28 | End: 2021-01-28

## 2021-01-28 RX ORDER — HEPARIN SODIUM (PORCINE) LOCK FLUSH IV SOLN 100 UNIT/ML 100 UNIT/ML
5 SOLUTION INTRAVENOUS ONCE
Status: COMPLETED | OUTPATIENT
Start: 2021-01-28 | End: 2021-01-28

## 2021-01-28 RX ADMIN — Medication 5 ML: at 08:41

## 2021-01-28 RX ADMIN — HEPARIN 5 ML: 100 SYRINGE at 12:37

## 2021-01-28 RX ADMIN — HEPARIN, PORCINE (PF) 10 UNIT/ML INTRAVENOUS SYRINGE 50 UNITS: at 08:15

## 2021-01-28 RX ADMIN — HEPARIN, PORCINE (PF) 10 UNIT/ML INTRAVENOUS SYRINGE 5 ML: at 08:41

## 2021-01-28 RX ADMIN — Medication 50 UNITS: at 08:15

## 2021-01-28 RX ADMIN — KIT FOR THE PREPARATION OF TECHNETIUM TC 99M PENTETATE 1.3 MILLICURIE: 20 INJECTION, POWDER, LYOPHILIZED, FOR SOLUTION INTRAVENOUS; RESPIRATORY (INHALATION) at 08:30

## 2021-01-28 NOTE — PROGRESS NOTES
I did a huddle with Candido Han and Daphne.  Isael is well known to us. He will be undergoing UCB transplantation.  His work up thus far is clear.  He will see cards this week for his history of WPW.    Viky Zavala MD, PhD    Pediatric Blood and Marrow Transplant  [facility]

## 2021-01-28 NOTE — PROGRESS NOTES
Infusion Nursing Note    Artemio CELAYA Trung Presents to [facility] infusion center today for: Port access / labs    Due to : Status post bone marrow transplant (H)    Intravenous Access/Labs: Port accessed without difficulty. Labs drawn as ordered from port. Port heparin locked and left accessed for GFR apt today.

## 2021-01-28 NOTE — PROGRESS NOTES
First Name: Artemio  Age: 20 year old   Referring Physician: Dr. Olmos   REASON FOR REFERRAL: UCB transplant workup  Patient is undergoing w/u for allogeneic BMT using  cord blood    HPI:  Artemio is a 20 year old male with history of relapsed ALL, very high risk B-cell ALL + JAK2 activation,  who has undergone a matched sibling transplant. He is now in deep remission and will require a second BMT with UCB. Patient has a well functioning single lumen chest port on the right. He previously had a dual lumen tunneled Galvan on the left. He has no complaints about the prior lines and states they have functioned well.    PAST MEDICAL HISTORY:   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)      History of blood transfusion      WPW (Aaron-Parkinson-White syndrome) 03/2018     PAST SURGICAL HISTORY:   Past Surgical History:   Procedure Laterality Date     BONE MARROW BIOPSY, BONE SPECIMEN, NEEDLE/TROCAR Right 11/27/2018    Procedure: bone marrow biopsy;  Surgeon: Jacqueline Fitzgerald NP;  [facility]      BONE MARROW BIOPSY, BONE SPECIMEN, NEEDLE/TROCAR N/A 2/8/2019    Procedure: BIOPSY BONE MARROW;  Surgeon: Lisa Workman NP;  [facility]: [facility]      BONE MARROW BIOPSY, BONE SPECIMEN, NEEDLE/TROCAR N/A 5/16/2019    Procedure: BIOPSY, BONE MARROW;  Surgeon: Lilia López APRN CNP;  [facility]     BONE MARROW BIOPSY, BONE SPECIMEN, NEEDLE/TROCAR N/A 11/7/2019    Procedure: Bone marrow biopsy;  Surgeon: Jacqueline Shin NP;  [facility]: [facility]      BONE MARROW BIOPSY, BONE SPECIMEN, NEEDLE/TROCAR Right 6/10/2020    Procedure: BIOPSY, BONE MARROW;  Surgeon: Candido Han PA-C;  [facility]      BONE MARROW BIOPSY, BONE SPECIMEN, NEEDLE/TROCAR N/A 8/24/2020    Procedure: Bone marrow biopsy;  Surgeon: Jacqueline Shin NP;  [facility]      BONE MARROW BIOPSY, BONE SPECIMEN, NEEDLE/TROCAR N/A 9/25/2020     Procedure: BIOPSY, BONE MARROW;  Surgeon: Lilia Lpóez APRN CNP;  Location: [facility]      ESOPHAGOSCOPY, GASTROSCOPY, DUODENOSCOPY (EGD), COMBINED N/A 2/22/2019    Procedure: Upper endoscopy with biopsy;  Surgeon: Magdalene Hobson MD;  Location: [facility]      INSERT CATHETER VASCULAR ACCESS N/A 7/20/2020    Procedure: NON -APHERESIS Double lumen tunneled central burns line placement;  Surgeon: Pato Gómez PA-C;  [facility]: [facility]      INSERT CATHETER VASCULAR ACCESS APHERESIS CHILD N/A 6/10/2020    Procedure: Large Bore Double Lumen NON TUNNELED Apheresis Catheter placement;  Surgeon: Link Rios PA-C;  [facility]      INSERT CATHETER VASCULAR ACCESS DOUBLE LUMEN CHILD N/A 10/26/2018    Procedure: double lumen tunneled line placement;  Surgeon: Rex Valente MD;  [facility]      INSERT PORT VASCULAR ACCESS N/A 6/10/2020    Procedure: Port placement;  Surgeon: Link Rios PA-C;  [facility]      IR CHEST PORT PLACEMENT > 5 YRS OF AGE  6/10/2020     IR CVC NON TUNNEL LINE REMOVAL  6/12/2020     IR CVC NON TUNNEL PLACEMENT  6/10/2020     IR CVC TUNNEL PLACEMENT > 5 YRS OF AGE  7/20/2020     IR CVC TUNNEL REMOVAL LEFT  2/8/2019     IR CVC TUNNEL REMOVAL LEFT  8/24/2020     IR PORT REMOVAL LEFT  5/16/2019     O CLAVICLE LEFT Left     fracture with surgical repair and plate/screws     ORTHOPEDIC SURGERY      femur     REMOVE CATHETER VASCULAR ACCESS N/A 2/8/2019    Procedure: Tunneled line removal;  Surgeon: Krystal Esparza MD;  [facility]      REMOVE CATHETER VASCULAR ACCESS N/A 8/24/2020    Procedure: tunneled line removal;  Surgeon: Siri Hernandez PA-C;  [facility]      REMOVE PORT VASCULAR ACCESS N/A 5/16/2019    Procedure: REMOVAL, VASCULAR ACCESS PORT;  Surgeon: Link Rios PA-C;  [facility]     FAMILY HISTORY: No family history on file.  SOCIAL  HISTORY:   Social History     Tobacco Use     Smoking status: Never Smoker     Smokeless tobacco: Never Used   Substance Use Topics     Alcohol use: Never     Frequency: Never     PROBLEM LIST:   Patient Active Problem List    Diagnosis Date Noted     Neutropenic fever (H) 07/24/2020     Priority: Medium     Fever 07/17/2020     Priority: Medium     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) 06/10/2020     Priority: Medium     S/P allogeneic bone marrow transplant (H) 11/23/2018     Priority: Medium     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) 10/28/2018     Priority: Medium     At risk for infection 10/28/2018     Priority: Medium     Aaron-Parkinson-White (WPW) syndrome 10/22/2018     Priority: Medium     Bone marrow transplant candidate 10/22/2018     Priority: Medium     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) 02/15/2018     Priority: Medium     MEDICATIONS:   Prescription Medications as of 1/28/2021       Rx Number Disp Refills Start End Last Dispensed Date Next Fill Date Owning Pharmacy    VORICONAZOLE PO            Class: Historical    Route: Oral      Clinic-Administered Medications as of 1/28/2021       Dose Frequency Start End    heparin lock flush 10 UNIT/ML injection   1/28/2021 1/28/2021    Admin Instructions: Karla Llanes: cabinet override    Notes to Pharmacy: Karla Llanes: cabinet override      Hospital Medications as of 1/28/2021       Dose Frequency Start End    technetium pentetate Tc99m (DTPA) radioisotope injection 1.3 millicurie 1.3 millicurie ONCE 1/28/2021     Admin Instructions: Supplied by, and administered by Nuclear Medicine.  *HW*    Class: E-Prescribe    Route: Intravenous        ALLERGIES:    Allergies   Allergen Reactions     Blood Transfusion Related (Informational Only) Other (See Comments)     Patient has a history of a clinically significant antibody against RBC antigens.  A delay in compatible RBCs may occur.Stem cell transplant patient.  Give  type O RBCs.  Requires Benadryl and tylenol as premed for platelets and RBCs for hx of hives     Voriconazole Photosensitivity     Significant rash - do not rechallenge     VITALS: There were no vitals taken for this visit.    Physical Examination:   Constitutional: no distress and cooperative  Skin: no suspicious lesions or rashes    RESULTS:  BMP RESULTS:  Lab Results   Component Value Date     01/26/2021    POTASSIUM 3.9 01/26/2021    CHLORIDE 109 01/26/2021    CO2 30 01/26/2021    ANIONGAP 3 01/26/2021     (H) 01/26/2021    BUN 11 01/26/2021    CR 0.66 01/26/2021    GFRESTIMATED >90 01/26/2021    GFRESTBLACK >90 01/26/2021    ELSA 9.3 01/26/2021        CBC RESULTS:  Lab Results   Component Value Date    WBC 8.0 01/26/2021    RBC 4.30 (L) 01/26/2021    HGB 14.2 01/26/2021    HCT 41.5 01/26/2021    MCV 97 01/26/2021    MCH 33.0 01/26/2021    MCHC 34.2 01/26/2021    RDW 13.4 01/26/2021     01/26/2021       INR/PTT:  Lab Results   Component Value Date    INR 0.99 01/26/2021    PTT 27 01/26/2021         ASSESSMENT/PLAN:  Type of catheter: 9.5 Fr.  Double lumen tunneled power Galvan     Preferred Location: Left  Internal Jugular Vein     Patient is scheduled on 2/8/21 for Galvan line placement with IR and planned admission after placement.    Platelet count is   Lab Results   Component Value Date     01/26/2021    , and coagulation factors are   Lab Results   Component Value Date    INR 0.99 01/26/2021    ,  Lab Results   Component Value Date    PTT 27 01/26/2021   . The patient is at low risk for bleeding during the procedure.    PROVIDER NOTE:  The tunneled catheter placement procedure was explained to patient and father.    CONSENT: Affirmation of informed written consent was not obtained.     INSTRUCTIONS/GUIDELINES:   Eating and drinking restriction guidelines were reviewed., Anti-bacterial scrub was given and instructions for its use were reviewed to decrease the risk of infection on  the day of the procedure., I explained the expected length of time the tunneled catheter could remain in place., The role of Interventional Radiology was reviewed. and The principles of infection control were reviewed.    CC  Patient Care Team:  Janee Leung PA-C as PCP - General (Physician Assistant)  Betty Hills LICSW as  ( - Clinical)  Chiquita Lozano, RN as Research Personnel  Omayra Waldrop RN as Nurse Coordinator  Schwab, Briana, RN as Nurse Coordinator  Jacqueline Shin NP as Assigned Pediatric Specialist Provider  JANEE LEUNG

## 2021-01-28 NOTE — NURSING NOTE
This patient was seen for neuropsychological testing at the request of Dr. John Singleton for the purposes of diagnostic clarification and treatment planning. Total of 2 hours 30 minutes were spent in test administration and scoring by this writer, Tita Smith, psychometrist. See Dr. Singleton' testing evaluation report for a full interpretation of the findings and data.

## 2021-01-29 ENCOUNTER — ONCOLOGY VISIT (OUTPATIENT)
Dept: TRANSPLANT | Facility: CLINIC | Age: 21
End: 2021-01-29
Attending: PEDIATRICS
Payer: COMMERCIAL

## 2021-01-29 DIAGNOSIS — C91.01 ACUTE LYMPHOBLASTIC LEUKEMIA (ALL) IN REMISSION (H): Primary | ICD-10-CM

## 2021-01-29 DIAGNOSIS — C91.00 ALL (ACUTE LYMPHOCYTIC LEUKEMIA) (H): Primary | ICD-10-CM

## 2021-01-29 LAB
A*: NORMAL
A*LOCUS SEROLOGIC EQUIVALENT: 1
A*LOCUS: NORMAL
A*SEROLOGIC EQUIVALENT: 2
AB TEST METHOD: NORMAL
ABNGS COMMENTS: NORMAL
B*: NORMAL
B*LOCUS SEROLOGIC EQUIVALENT: 65
B*LOCUS: NORMAL
B*SEROLOGIC EQUIVALENT: 57
BW-1: NORMAL
BW-2: NORMAL
C*: NORMAL
C*LOCUS SEROLOGIC EQUIVALENT: 6
C*LOCUS: NORMAL
C*SEROLOGIC EQUIVALENT: 8
COMMENT VXMB1: NORMAL
COMMENT VXMT1: NORMAL
CROSSMATCHDATEVXM: NORMAL
DONOR VXM: NORMAL
DPA1*: NORMAL
DPB1*: NORMAL
DQA1*: NORMAL
DQA1*LOCUS: NORMAL
DQB1*: NORMAL
DQB1*LOCUS SEROLOGIC EQUIVALENT: 9
DQB1*LOCUS: NORMAL
DQB1*SEROLOGIC EQUIVALENT: 6
DRB1*: NORMAL
DRB1*LOCUS SEROLOGIC EQUIVALENT: 13
DRB1*LOCUS: NORMAL
DRB1*SEROLOGIC EQUIVALENT: 7
DRB3*LOCUS SEROLOGIC EQUIVALENT: 52
DRB3*LOCUS: NORMAL
DRB4*: NORMAL
DRB4*SEROLOGIC EQUIVALENT: NORMAL
DRNGS COMMENTS: NORMAL
DRSSO TEST METHOD: NORMAL
DSA VXM B1: NORMAL
DSA VXMT1: NORMAL
HBV DNA SERPL QL NAA+PROBE: NONREACTIVE
HCV RNA SERPL QL NAA+PROBE: NONREACTIVE
HIV1+2 RNA SERPL QL NAA+PROBE: NONREACTIVE
RESULT VXM B1: NORMAL
RESULT VXM T1: NORMAL
SERUM DATE VXM B1: NORMAL
SERUM DATE VXM T1: NORMAL
WNV RNA SERPL DONR QL NAA+PROBE: NONREACTIVE

## 2021-01-29 PROCEDURE — 99215 OFFICE O/P EST HI 40 MIN: CPT | Performed by: PEDIATRICS

## 2021-01-29 NOTE — NURSING NOTE
Chief Complaint   Patient presents with     RECHECK     Patient is here for Exit Conference       No vitals were taken for this visit per Dr. Zavala.    Agnes Longoria, EMT  January 29, 2021

## 2021-01-29 NOTE — PROGRESS NOTES
In today's visit, we discussed in detail the research for which Artemio Nino is eligible. We discussed the potential risks and potential benefits of each protocol individually. We explained potential alternatives to the protocols discussed. We explained to the patient that participation is voluntary and that consent may be withdrawn at any time.     Lansky/Karnofsky score: 100    Active infections:  None  Prior infections that require additional special prophylaxis considerations: none.  I reviewed and discussed infectious disease evaluation with the patient and the management plan during treatment.    Reproductive status: The patient received appropriate reproductive counseling and agreed with the need for effective contraception during the treatment procedures.    Dental health suitable to proceed: no issues.      After our detailed discussion above, the patient/patient's parents signed the following consents for treatment and protocols after ample time was given for review:  2013-09 (main protocol), 2011-13R, 2019-24, 2008-05 and 1996-16 A&B were signed on Tuesday. Pt to be consented to VOD study (2017-37R) upon admission, as well as obtain 1996-16 B sample.    Of note he also had a ziopatch placed for history of WPW.  Dr. Plummer wants weekly pro-BNP and troponin levels.  He also has study labs required for PLAT-5.      The patient/patient's parents received a signed copy of the consents. The patient/patient's parents had the opportunity to ask questions that were answered to the best of my ability and to the patient's/patient's parents apparent satisfaction.    Viky Zavala MD, PhD    Pediatric Blood and Marrow Transplant  [facility]    Total time spent on the following services on the date of the encounter: 65 minutes  Preparing to see patient, chart review, review of outside records, ordering medications, test, procedures, interpretation  of labs, imaging and other tests, counseling and educating the patient/family/caregiver, documenting clinical information in the electronic or other health record, and communicating results to the patient/family/caregiver.

## 2021-02-02 LAB — TRYPANOSOMA CRUZI: NORMAL

## 2021-02-02 RX ORDER — VALACYCLOVIR HYDROCHLORIDE 1 G/1
500 TABLET, FILM COATED ORAL 2 TIMES DAILY
Qty: 84 TABLET | Refills: 0 | Status: ON HOLD | COMMUNITY
Start: 2021-02-02 | End: 2021-03-12

## 2021-02-03 ENCOUNTER — TRANSFERRED RECORDS (OUTPATIENT)
Dept: TRANSPLANT | Facility: CLINIC | Age: 21
End: 2021-02-03

## 2021-02-03 ENCOUNTER — RESEARCH ENCOUNTER (OUTPATIENT)
Dept: TRANSPLANT | Facility: CLINIC | Age: 21
End: 2021-02-03

## 2021-02-03 ENCOUNTER — CARE COORDINATION (OUTPATIENT)
Dept: TRANSPLANT | Facility: CLINIC | Age: 21
End: 2021-02-03

## 2021-02-03 DIAGNOSIS — Z00.6 EXAMINATION OF PARTICIPANT OR CONTROL IN CLINICAL RESEARCH: ICD-10-CM

## 2021-02-03 DIAGNOSIS — C91.00 ALL (ACUTE LYMPHOCYTIC LEUKEMIA) (H): Primary | ICD-10-CM

## 2021-02-04 ENCOUNTER — TELEPHONE (OUTPATIENT)
Dept: TRANSPLANT | Facility: CLINIC | Age: 21
End: 2021-02-04

## 2021-02-04 NOTE — PROGRESS NOTES
[id number] treatment plan entered in anticipation of BMT admit Monday, 2/8.     Isabel Baker, Research APRN

## 2021-02-05 ENCOUNTER — ALLIED HEALTH/NURSE VISIT (OUTPATIENT)
Dept: TRANSPLANT | Facility: CLINIC | Age: 21
End: 2021-02-05
Attending: PEDIATRICS
Payer: COMMERCIAL

## 2021-02-05 DIAGNOSIS — Z11.59 ENCOUNTER FOR SCREENING FOR OTHER VIRAL DISEASES: ICD-10-CM

## 2021-02-05 LAB
LABORATORY COMMENT REPORT: NORMAL
SARS-COV-2 RNA RESP QL NAA+PROBE: NEGATIVE
SARS-COV-2 RNA RESP QL NAA+PROBE: NORMAL
SPECIMEN SOURCE: NORMAL
SPECIMEN SOURCE: NORMAL

## 2021-02-05 PROCEDURE — U0003 INFECTIOUS AGENT DETECTION BY NUCLEIC ACID (DNA OR RNA); SEVERE ACUTE RESPIRATORY SYNDROME CORONAVIRUS 2 (SARS-COV-2) (CORONAVIRUS DISEASE [COVID-19]), AMPLIFIED PROBE TECHNIQUE, MAKING USE OF HIGH THROUGHPUT TECHNOLOGIES AS DESCRIBED BY CMS-2020-01-R: HCPCS | Performed by: PEDIATRICS

## 2021-02-05 PROCEDURE — 38207 CRYOPRESERVE STEM CELLS: CPT | Performed by: PATHOLOGY

## 2021-02-05 PROCEDURE — U0005 INFEC AGEN DETEC AMPLI PROBE: HCPCS | Performed by: PEDIATRICS

## 2021-02-07 ENCOUNTER — ANESTHESIA EVENT (OUTPATIENT)
Dept: PEDIATRICS | Facility: CLINIC | Age: 21
DRG: 014 | End: 2021-02-07
Payer: COMMERCIAL

## 2021-02-08 ENCOUNTER — ANESTHESIA (OUTPATIENT)
Dept: PEDIATRICS | Facility: CLINIC | Age: 21
DRG: 014 | End: 2021-02-08
Payer: COMMERCIAL

## 2021-02-08 ENCOUNTER — HOSPITAL ENCOUNTER (OUTPATIENT)
Dept: INTERVENTIONAL RADIOLOGY/VASCULAR | Facility: CLINIC | Age: 21
DRG: 014 | End: 2021-02-08
Attending: PEDIATRICS | Admitting: PEDIATRICS
Payer: COMMERCIAL

## 2021-02-08 ENCOUNTER — HOSPITAL ENCOUNTER (INPATIENT)
Facility: CLINIC | Age: 21
LOS: 35 days | Discharge: HOME | DRG: 014 | End: 2021-03-15
Attending: PEDIATRICS | Admitting: PEDIATRICS
Payer: COMMERCIAL

## 2021-02-08 DIAGNOSIS — C91.01 ACUTE LYMPHOBLASTIC LEUKEMIA (ALL) IN REMISSION (H): ICD-10-CM

## 2021-02-08 DIAGNOSIS — C91.02 ACUTE LYMPHOBLASTIC LEUKEMIA (ALL) IN RELAPSE (H): Primary | ICD-10-CM

## 2021-02-08 DIAGNOSIS — Z76.82 BONE MARROW TRANSPLANT CANDIDATE: ICD-10-CM

## 2021-02-08 DIAGNOSIS — Z91.89 AT RISK FOR INFECTION: ICD-10-CM

## 2021-02-08 DIAGNOSIS — Z00.6 EXAMINATION OF PARTICIPANT OR CONTROL IN CLINICAL RESEARCH: ICD-10-CM

## 2021-02-08 DIAGNOSIS — C91.00 LEUKEMIA, ACUTE LYMPHOID (H): ICD-10-CM

## 2021-02-08 LAB
ALBUMIN SERPL-MCNC: 3.6 G/DL (ref 3.4–5)
ALP SERPL-CCNC: 43 U/L (ref 40–150)
ALT SERPL W P-5'-P-CCNC: 51 U/L (ref 0–70)
ANION GAP SERPL CALCULATED.3IONS-SCNC: 4 MMOL/L (ref 3–14)
APTT PPP: 31 SEC (ref 22–37)
AST SERPL W P-5'-P-CCNC: 35 U/L (ref 0–45)
BASOPHILS # BLD AUTO: 0 10E9/L (ref 0–0.2)
BASOPHILS NFR BLD AUTO: 0.2 %
BILIRUB DIRECT SERPL-MCNC: <0.1 MG/DL (ref 0–0.2)
BILIRUB SERPL-MCNC: 0.2 MG/DL (ref 0.2–1.3)
BUN SERPL-MCNC: 9 MG/DL (ref 7–30)
CALCIUM SERPL-MCNC: 9 MG/DL (ref 8.5–10.1)
CHLORIDE SERPL-SCNC: 107 MMOL/L (ref 94–109)
CO2 SERPL-SCNC: 30 MMOL/L (ref 20–32)
CREAT SERPL-MCNC: 0.65 MG/DL (ref 0.66–1.25)
DIFFERENTIAL METHOD BLD: ABNORMAL
EOSINOPHIL # BLD AUTO: 0.1 10E9/L (ref 0–0.7)
EOSINOPHIL NFR BLD AUTO: 1.8 %
ERYTHROCYTE [DISTWIDTH] IN BLOOD BY AUTOMATED COUNT: 13 % (ref 10–15)
FERRITIN SERPL-MCNC: 912 NG/ML (ref 26–388)
FIBRINOGEN PPP-MCNC: 381 MG/DL (ref 200–420)
GFR SERPL CREATININE-BSD FRML MDRD: >90 ML/MIN/{1.73_M2}
GLUCOSE SERPL-MCNC: 94 MG/DL (ref 70–99)
HCT VFR BLD AUTO: 41.2 % (ref 40–53)
HGB BLD-MCNC: 14.3 G/DL (ref 13.3–17.7)
IMM GRANULOCYTES # BLD: 0 10E9/L (ref 0–0.4)
IMM GRANULOCYTES NFR BLD: 0.4 %
INR PPP: 0.99 (ref 0.86–1.14)
LYMPHOCYTES # BLD AUTO: 0.5 10E9/L (ref 0.8–5.3)
LYMPHOCYTES NFR BLD AUTO: 9.8 %
MCH RBC QN AUTO: 32 PG (ref 26.5–33)
MCHC RBC AUTO-ENTMCNC: 34.7 G/DL (ref 31.5–36.5)
MCV RBC AUTO: 92 FL (ref 78–100)
MONOCYTES # BLD AUTO: 0.7 10E9/L (ref 0–1.3)
MONOCYTES NFR BLD AUTO: 12 %
NEUTROPHILS # BLD AUTO: 4.1 10E9/L (ref 1.6–8.3)
NEUTROPHILS NFR BLD AUTO: 75.8 %
NRBC # BLD AUTO: 0 10*3/UL
NRBC BLD AUTO-RTO: 0 /100
PLATELET # BLD AUTO: 175 10E9/L (ref 150–450)
POTASSIUM SERPL-SCNC: 3.7 MMOL/L (ref 3.4–5.3)
PROT SERPL-MCNC: 6.5 G/DL (ref 6.8–8.8)
RBC # BLD AUTO: 4.47 10E12/L (ref 4.4–5.9)
SODIUM SERPL-SCNC: 141 MMOL/L (ref 133–144)
WBC # BLD AUTO: 5.4 10E9/L (ref 4–11)

## 2021-02-08 PROCEDURE — 77001 FLUOROGUIDE FOR VEIN DEVICE: CPT

## 2021-02-08 PROCEDURE — 82248 BILIRUBIN DIRECT: CPT | Performed by: PEDIATRICS

## 2021-02-08 PROCEDURE — 258N000003 HC RX IP 258 OP 636: Performed by: NURSE PRACTITIONER

## 2021-02-08 PROCEDURE — 76937 US GUIDE VASCULAR ACCESS: CPT | Mod: 26 | Performed by: PHYSICIAN ASSISTANT

## 2021-02-08 PROCEDURE — 250N000009 HC RX 250: Performed by: PHYSICIAN ASSISTANT

## 2021-02-08 PROCEDURE — 99223 1ST HOSP IP/OBS HIGH 75: CPT | Mod: AI | Performed by: PEDIATRICS

## 2021-02-08 PROCEDURE — 85610 PROTHROMBIN TIME: CPT | Performed by: PEDIATRICS

## 2021-02-08 PROCEDURE — 250N000011 HC RX IP 250 OP 636: Performed by: PHYSICIAN ASSISTANT

## 2021-02-08 PROCEDURE — 250N000011 HC RX IP 250 OP 636

## 2021-02-08 PROCEDURE — 250N000013 HC RX MED GY IP 250 OP 250 PS 637: Performed by: NURSE PRACTITIONER

## 2021-02-08 PROCEDURE — 370N000017 HC ANESTHESIA TECHNICAL FEE, PER MIN: Performed by: PHYSICIAN ASSISTANT

## 2021-02-08 PROCEDURE — 999N000141 HC STATISTIC PRE-PROCEDURE NURSING ASSESSMENT: Performed by: PHYSICIAN ASSISTANT

## 2021-02-08 PROCEDURE — 85300 ANTITHROMBIN III ACTIVITY: CPT | Performed by: PEDIATRICS

## 2021-02-08 PROCEDURE — 76937 US GUIDE VASCULAR ACCESS: CPT

## 2021-02-08 PROCEDURE — 250N000011 HC RX IP 250 OP 636: Performed by: NURSE ANESTHETIST, CERTIFIED REGISTERED

## 2021-02-08 PROCEDURE — 272N000144 HC KIT CR4

## 2021-02-08 PROCEDURE — 02H633Z INSERTION OF INFUSION DEVICE INTO RIGHT ATRIUM, PERCUTANEOUS APPROACH: ICD-10-PCS | Performed by: PHYSICIAN ASSISTANT

## 2021-02-08 PROCEDURE — 36558 INSERT TUNNELED CV CATH: CPT | Performed by: PHYSICIAN ASSISTANT

## 2021-02-08 PROCEDURE — 272N000147 HC KIT CR7

## 2021-02-08 PROCEDURE — 258N000001 HC RX 258: Performed by: NURSE PRACTITIONER

## 2021-02-08 PROCEDURE — 999N000131 HC STATISTIC POST-PROCEDURE RECOVERY CARE: Performed by: PHYSICIAN ASSISTANT

## 2021-02-08 PROCEDURE — 85025 COMPLETE CBC W/AUTO DIFF WBC: CPT | Performed by: PEDIATRICS

## 2021-02-08 PROCEDURE — 85384 FIBRINOGEN ACTIVITY: CPT | Performed by: PEDIATRICS

## 2021-02-08 PROCEDURE — C1769 GUIDE WIRE: HCPCS

## 2021-02-08 PROCEDURE — 3E04305 INTRODUCTION OF OTHER ANTINEOPLASTIC INTO CENTRAL VEIN, PERCUTANEOUS APPROACH: ICD-10-PCS | Performed by: PEDIATRICS

## 2021-02-08 PROCEDURE — 258N000003 HC RX IP 258 OP 636: Performed by: NURSE ANESTHETIST, CERTIFIED REGISTERED

## 2021-02-08 PROCEDURE — 87102 FUNGUS ISOLATION CULTURE: CPT | Performed by: PEDIATRICS

## 2021-02-08 PROCEDURE — 250N000013 HC RX MED GY IP 250 OP 250 PS 637: Performed by: PEDIATRICS

## 2021-02-08 PROCEDURE — 85245 CLOT FACTOR VIII VW RISTOCTN: CPT | Performed by: PEDIATRICS

## 2021-02-08 PROCEDURE — 77001 FLUOROGUIDE FOR VEIN DEVICE: CPT | Mod: 26 | Performed by: PHYSICIAN ASSISTANT

## 2021-02-08 PROCEDURE — 82728 ASSAY OF FERRITIN: CPT | Performed by: PEDIATRICS

## 2021-02-08 PROCEDURE — 80053 COMPREHEN METABOLIC PANEL: CPT | Performed by: PEDIATRICS

## 2021-02-08 PROCEDURE — 250N000011 HC RX IP 250 OP 636: Performed by: NURSE PRACTITIONER

## 2021-02-08 PROCEDURE — 258N000003 HC RX IP 258 OP 636: Performed by: PHYSICIAN ASSISTANT

## 2021-02-08 PROCEDURE — C1751 CATH, INF, PER/CENT/MIDLINE: HCPCS

## 2021-02-08 PROCEDURE — 206N000001 HC R&B BMT UMMC

## 2021-02-08 PROCEDURE — 85303 CLOT INHIBIT PROT C ACTIVITY: CPT | Performed by: PEDIATRICS

## 2021-02-08 PROCEDURE — 0JH63XZ INSERTION OF TUNNELED VASCULAR ACCESS DEVICE INTO CHEST SUBCUTANEOUS TISSUE AND FASCIA, PERCUTANEOUS APPROACH: ICD-10-PCS | Performed by: PHYSICIAN ASSISTANT

## 2021-02-08 PROCEDURE — 86900 BLOOD TYPING SEROLOGIC ABO: CPT | Performed by: PEDIATRICS

## 2021-02-08 PROCEDURE — 85730 THROMBOPLASTIN TIME PARTIAL: CPT | Performed by: PEDIATRICS

## 2021-02-08 RX ORDER — FENTANYL CITRATE 50 UG/ML
INJECTION, SOLUTION INTRAMUSCULAR; INTRAVENOUS PRN
Status: DISCONTINUED | OUTPATIENT
Start: 2021-02-08 | End: 2021-02-08

## 2021-02-08 RX ORDER — HYDRALAZINE HYDROCHLORIDE 20 MG/ML
10 INJECTION INTRAMUSCULAR; INTRAVENOUS EVERY 4 HOURS PRN
Status: CANCELLED | OUTPATIENT
Start: 2021-02-08

## 2021-02-08 RX ORDER — ONDANSETRON 2 MG/ML
INJECTION INTRAMUSCULAR; INTRAVENOUS PRN
Status: DISCONTINUED | OUTPATIENT
Start: 2021-02-08 | End: 2021-02-08

## 2021-02-08 RX ORDER — HEPARIN SODIUM,PORCINE 10 UNIT/ML
5-10 VIAL (ML) INTRAVENOUS
Status: DISCONTINUED | OUTPATIENT
Start: 2021-02-08 | End: 2021-03-15 | Stop reason: HOSPADM

## 2021-02-08 RX ORDER — HYDRALAZINE HYDROCHLORIDE 20 MG/ML
10 INJECTION INTRAMUSCULAR; INTRAVENOUS EVERY 4 HOURS PRN
Status: DISCONTINUED | OUTPATIENT
Start: 2021-02-08 | End: 2021-03-15 | Stop reason: HOSPADM

## 2021-02-08 RX ORDER — CALCIUM CARBONATE 500 MG/1
500 TABLET, CHEWABLE ORAL 3 TIMES DAILY PRN
Status: DISCONTINUED | OUTPATIENT
Start: 2021-02-08 | End: 2021-03-15 | Stop reason: HOSPADM

## 2021-02-08 RX ORDER — EPINEPHRINE 1 MG/ML
0.3 INJECTION, SOLUTION, CONCENTRATE INTRAVENOUS EVERY 5 MIN PRN
Status: DISCONTINUED | OUTPATIENT
Start: 2021-02-08 | End: 2021-02-22 | Stop reason: CLARIF

## 2021-02-08 RX ORDER — LIDOCAINE HYDROCHLORIDE 10 MG/ML
1-5 INJECTION, SOLUTION EPIDURAL; INFILTRATION; INTRACAUDAL; PERINEURAL ONCE
Status: COMPLETED | OUTPATIENT
Start: 2021-02-08 | End: 2021-02-08

## 2021-02-08 RX ORDER — DIPHENHYDRAMINE HYDROCHLORIDE 50 MG/ML
50 INJECTION INTRAMUSCULAR; INTRAVENOUS ONCE
Status: DISCONTINUED | OUTPATIENT
Start: 2021-02-16 | End: 2021-02-16

## 2021-02-08 RX ORDER — CALCIUM CARBONATE 500 MG/1
1 TABLET, CHEWABLE ORAL 3 TIMES DAILY PRN
COMMUNITY
End: 2021-03-17

## 2021-02-08 RX ORDER — ACETAMINOPHEN 325 MG/1
650 TABLET ORAL EVERY 6 HOURS PRN
COMMUNITY
End: 2021-03-19

## 2021-02-08 RX ORDER — URSODIOL 300 MG/1
600 CAPSULE ORAL 3 TIMES DAILY
Status: DISCONTINUED | OUTPATIENT
Start: 2021-02-08 | End: 2021-03-15 | Stop reason: HOSPADM

## 2021-02-08 RX ORDER — DIPHENHYDRAMINE HYDROCHLORIDE 50 MG/ML
50 INJECTION INTRAMUSCULAR; INTRAVENOUS EVERY 24 HOURS
Status: COMPLETED | OUTPATIENT
Start: 2021-02-12 | End: 2021-02-14

## 2021-02-08 RX ORDER — HEPARIN SODIUM,PORCINE 10 UNIT/ML
5 VIAL (ML) INTRAVENOUS EVERY 24 HOURS
Status: DISCONTINUED | OUTPATIENT
Start: 2021-02-08 | End: 2021-03-15

## 2021-02-08 RX ORDER — ONDANSETRON 2 MG/ML
8 INJECTION INTRAMUSCULAR; INTRAVENOUS ONCE
Status: COMPLETED | OUTPATIENT
Start: 2021-02-08 | End: 2021-02-08

## 2021-02-08 RX ORDER — HYDRALAZINE HYDROCHLORIDE 20 MG/ML
10 INJECTION INTRAMUSCULAR; INTRAVENOUS EVERY 4 HOURS PRN
Status: DISCONTINUED | OUTPATIENT
Start: 2021-02-16 | End: 2021-02-08

## 2021-02-08 RX ORDER — HEPARIN SODIUM,PORCINE 10 UNIT/ML
4 VIAL (ML) INTRAVENOUS ONCE
Status: COMPLETED | OUTPATIENT
Start: 2021-02-08 | End: 2021-02-08

## 2021-02-08 RX ORDER — HEPARIN SODIUM (PORCINE) LOCK FLUSH IV SOLN 100 UNIT/ML 100 UNIT/ML
5 SOLUTION INTRAVENOUS
Status: DISCONTINUED | OUTPATIENT
Start: 2021-02-08 | End: 2021-03-15 | Stop reason: HOSPADM

## 2021-02-08 RX ORDER — SODIUM CHLORIDE, SODIUM LACTATE, POTASSIUM CHLORIDE, CALCIUM CHLORIDE 600; 310; 30; 20 MG/100ML; MG/100ML; MG/100ML; MG/100ML
INJECTION, SOLUTION INTRAVENOUS CONTINUOUS PRN
Status: DISCONTINUED | OUTPATIENT
Start: 2021-02-08 | End: 2021-02-08

## 2021-02-08 RX ORDER — PANTOPRAZOLE SODIUM 40 MG/1
40 TABLET, DELAYED RELEASE ORAL
Status: DISCONTINUED | OUTPATIENT
Start: 2021-02-08 | End: 2021-02-23

## 2021-02-08 RX ORDER — LORAZEPAM 2 MG/ML
1 INJECTION INTRAMUSCULAR EVERY 6 HOURS PRN
Status: DISCONTINUED | OUTPATIENT
Start: 2021-02-08 | End: 2021-02-12

## 2021-02-08 RX ORDER — ACETAMINOPHEN 500 MG
500 TABLET ORAL ONCE
Status: DISCONTINUED | OUTPATIENT
Start: 2021-02-16 | End: 2021-02-16

## 2021-02-08 RX ORDER — METHYLPREDNISOLONE SODIUM SUCCINATE 125 MG/2ML
2 INJECTION, POWDER, LYOPHILIZED, FOR SOLUTION INTRAMUSCULAR; INTRAVENOUS
Status: DISCONTINUED | OUTPATIENT
Start: 2021-02-08 | End: 2021-02-22 | Stop reason: CLARIF

## 2021-02-08 RX ORDER — URSODIOL 250 MG/1
10 TABLET, FILM COATED ORAL 3 TIMES DAILY
Status: CANCELLED | OUTPATIENT
Start: 2021-02-08

## 2021-02-08 RX ORDER — DIPHENHYDRAMINE HYDROCHLORIDE 50 MG/ML
50 INJECTION INTRAMUSCULAR; INTRAVENOUS EVERY 6 HOURS PRN
Status: DISCONTINUED | OUTPATIENT
Start: 2021-02-08 | End: 2021-03-15 | Stop reason: HOSPADM

## 2021-02-08 RX ORDER — VALACYCLOVIR HYDROCHLORIDE 500 MG/1
500 TABLET, FILM COATED ORAL EVERY 12 HOURS SCHEDULED
Status: COMPLETED | OUTPATIENT
Start: 2021-02-08 | End: 2021-02-11

## 2021-02-08 RX ORDER — ACETAMINOPHEN 500 MG
500 TABLET ORAL EVERY 24 HOURS
Status: COMPLETED | OUTPATIENT
Start: 2021-02-12 | End: 2021-02-14

## 2021-02-08 RX ORDER — LEVETIRACETAM 500 MG/1
500 TABLET ORAL 2 TIMES DAILY
Status: COMPLETED | OUTPATIENT
Start: 2021-02-10 | End: 2021-02-14

## 2021-02-08 RX ORDER — PROPOFOL 10 MG/ML
INJECTION, EMULSION INTRAVENOUS PRN
Status: DISCONTINUED | OUTPATIENT
Start: 2021-02-08 | End: 2021-02-08

## 2021-02-08 RX ORDER — HEPARIN SODIUM,PORCINE 10 UNIT/ML
3-6 VIAL (ML) INTRAVENOUS EVERY 24 HOURS
Status: DISCONTINUED | OUTPATIENT
Start: 2021-02-08 | End: 2021-03-15 | Stop reason: HOSPADM

## 2021-02-08 RX ORDER — ACETAMINOPHEN 500 MG
500 TABLET ORAL EVERY 4 HOURS PRN
Status: DISCONTINUED | OUTPATIENT
Start: 2021-02-16 | End: 2021-02-14

## 2021-02-08 RX ORDER — LORAZEPAM 2 MG/ML
0.01 INJECTION INTRAMUSCULAR EVERY 6 HOURS PRN
Status: CANCELLED | OUTPATIENT
Start: 2021-02-08

## 2021-02-08 RX ORDER — PANTOPRAZOLE SODIUM 40 MG/1
40 TABLET, DELAYED RELEASE ORAL DAILY
Status: DISCONTINUED | OUTPATIENT
Start: 2021-02-08 | End: 2021-02-08

## 2021-02-08 RX ORDER — SODIUM CHLORIDE 9 MG/ML
200 INJECTION, SOLUTION INTRAVENOUS CONTINUOUS PRN
Status: DISCONTINUED | OUTPATIENT
Start: 2021-02-08 | End: 2021-02-22 | Stop reason: CLARIF

## 2021-02-08 RX ORDER — DIPHENHYDRAMINE HYDROCHLORIDE 50 MG/ML
50 INJECTION INTRAMUSCULAR; INTRAVENOUS
Status: DISCONTINUED | OUTPATIENT
Start: 2021-02-08 | End: 2021-02-22 | Stop reason: CLARIF

## 2021-02-08 RX ORDER — PROPOFOL 10 MG/ML
INJECTION, EMULSION INTRAVENOUS CONTINUOUS PRN
Status: DISCONTINUED | OUTPATIENT
Start: 2021-02-08 | End: 2021-02-08

## 2021-02-08 RX ORDER — PANTOPRAZOLE SODIUM 40 MG/1
40 TABLET, DELAYED RELEASE ORAL DAILY
Status: CANCELLED | OUTPATIENT
Start: 2021-02-08

## 2021-02-08 RX ORDER — HEPARIN SODIUM,PORCINE 10 UNIT/ML
VIAL (ML) INTRAVENOUS
Status: DISCONTINUED
Start: 2021-02-08 | End: 2021-02-08 | Stop reason: HOSPADM

## 2021-02-08 RX ORDER — ALBUTEROL SULFATE 0.83 MG/ML
2.5 SOLUTION RESPIRATORY (INHALATION)
Status: DISCONTINUED | OUTPATIENT
Start: 2021-02-08 | End: 2021-02-22 | Stop reason: CLARIF

## 2021-02-08 RX ORDER — ALBUTEROL SULFATE 90 UG/1
1-2 AEROSOL, METERED RESPIRATORY (INHALATION)
Status: DISCONTINUED | OUTPATIENT
Start: 2021-02-08 | End: 2021-02-22 | Stop reason: CLARIF

## 2021-02-08 RX ORDER — HEPARIN SODIUM,PORCINE 10 UNIT/ML
3-6 VIAL (ML) INTRAVENOUS
Status: DISCONTINUED | OUTPATIENT
Start: 2021-02-08 | End: 2021-03-15 | Stop reason: HOSPADM

## 2021-02-08 RX ADMIN — CEFAZOLIN SODIUM 1500 MG: 10 INJECTION, POWDER, FOR SOLUTION INTRAVENOUS at 10:52

## 2021-02-08 RX ADMIN — FENTANYL CITRATE 25 MCG: 50 INJECTION, SOLUTION INTRAMUSCULAR; INTRAVENOUS at 10:55

## 2021-02-08 RX ADMIN — Medication 5 ML: at 12:05

## 2021-02-08 RX ADMIN — URSODIOL 600 MG: 300 CAPSULE ORAL at 13:40

## 2021-02-08 RX ADMIN — ONDANSETRON 4 MG: 2 INJECTION INTRAMUSCULAR; INTRAVENOUS at 10:57

## 2021-02-08 RX ADMIN — HEPARIN, PORCINE (PF) 10 UNIT/ML INTRAVENOUS SYRINGE 4 ML: at 11:08

## 2021-02-08 RX ADMIN — HEPARIN 5 ML: 100 SYRINGE at 14:37

## 2021-02-08 RX ADMIN — PANTOPRAZOLE SODIUM 40 MG: 40 TABLET, DELAYED RELEASE ORAL at 19:56

## 2021-02-08 RX ADMIN — SODIUM CHLORIDE, PRESERVATIVE FREE 5 ML: 5 INJECTION INTRAVENOUS at 12:05

## 2021-02-08 RX ADMIN — LIDOCAINE HYDROCHLORIDE 4 ML: 10 INJECTION, SOLUTION EPIDURAL; INFILTRATION; INTRACAUDAL; PERINEURAL at 11:06

## 2021-02-08 RX ADMIN — MICAFUNGIN SODIUM 150 MG: 50 INJECTION, POWDER, LYOPHILIZED, FOR SOLUTION INTRAVENOUS at 17:02

## 2021-02-08 RX ADMIN — VALACYCLOVIR HYDROCHLORIDE 500 MG: 500 TABLET, FILM COATED ORAL at 19:56

## 2021-02-08 RX ADMIN — PROPOFOL 70 MG: 10 INJECTION, EMULSION INTRAVENOUS at 10:48

## 2021-02-08 RX ADMIN — ONDANSETRON 8 MG: 2 INJECTION INTRAMUSCULAR; INTRAVENOUS at 23:23

## 2021-02-08 RX ADMIN — PROPOFOL 30 MG: 10 INJECTION, EMULSION INTRAVENOUS at 10:58

## 2021-02-08 RX ADMIN — PROPOFOL 300 MCG/KG/MIN: 10 INJECTION, EMULSION INTRAVENOUS at 10:48

## 2021-02-08 RX ADMIN — PANTOPRAZOLE SODIUM 40 MG: 40 TABLET, DELAYED RELEASE ORAL at 13:40

## 2021-02-08 RX ADMIN — Medication: at 17:03

## 2021-02-08 RX ADMIN — ONDANSETRON 1 MG/HR: 2 INJECTION INTRAMUSCULAR; INTRAVENOUS at 23:23

## 2021-02-08 RX ADMIN — DEXTROSE AND SODIUM CHLORIDE: 5; 450 INJECTION, SOLUTION INTRAVENOUS at 13:41

## 2021-02-08 RX ADMIN — FENTANYL CITRATE 25 MCG: 50 INJECTION, SOLUTION INTRAMUSCULAR; INTRAVENOUS at 10:58

## 2021-02-08 RX ADMIN — URSODIOL 600 MG: 300 CAPSULE ORAL at 19:56

## 2021-02-08 RX ADMIN — SODIUM CHLORIDE, POTASSIUM CHLORIDE, SODIUM LACTATE AND CALCIUM CHLORIDE: 600; 310; 30; 20 INJECTION, SOLUTION INTRAVENOUS at 10:48

## 2021-02-08 ASSESSMENT — MIFFLIN-ST. JEOR
SCORE: 1637.21
SCORE: 1624.37

## 2021-02-08 NOTE — ANESTHESIA PREPROCEDURE EVALUATION
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]   Procedure : Procedure(s):  apheresis catheter placement     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)      History of blood transfusion      WPW (Aaron-Parkinson-White syndrome) 2018      Past Surgical History:   Procedure Laterality Date     BONE MARROW BIOPSY, BONE SPECIMEN, NEEDLE/TROCAR Right 2018    Procedure: bone marrow biopsy;  Surgeon: Jacqueline Fitzgerald NP;  [facility]      BONE MARROW BIOPSY, BONE SPECIMEN, NEEDLE/TROCAR N/A 2019    Procedure: BIOPSY BONE MARROW;  Surgeon: Lisa Workman NP;  [facility]: [facility]      BONE MARROW BIOPSY, BONE SPECIMEN, NEEDLE/TROCAR N/A 2019    Procedure: BIOPSY, BONE MARROW;  Surgeon: Lilia López APRN CNP;  [facility]     BONE MARROW BIOPSY, BONE SPECIMEN, NEEDLE/TROCAR N/A 2019    Procedure: Bone marrow biopsy;  Surgeon: Jacqueline Shin NP;  [facility]      BONE MARROW BIOPSY, BONE SPECIMEN, NEEDLE/TROCAR Right 6/10/2020    Procedure: BIOPSY, BONE MARROW;  Surgeon: Candido Han PA-C;  [facility]      BONE MARROW BIOPSY, BONE SPECIMEN, NEEDLE/TROCAR N/A 2020    Procedure: Bone marrow biopsy;  Surgeon: Jacqueline Shin NP;  [facility]      BONE MARROW BIOPSY, BONE SPECIMEN, NEEDLE/TROCAR N/A 2020    Procedure: BIOPSY, BONE MARROW;  Surgeon: Lilia López APRN CNP;  [facility]      ESOPHAGOSCOPY, GASTROSCOPY, DUODENOSCOPY (EGD), COMBINED N/A 2019    Procedure: Upper endoscopy with biopsy;  Surgeon: Magdalene Hobson MD;  [facility]      INSERT CATHETER VASCULAR ACCESS N/A 2020    Procedure: NON -APHERESIS Double lumen tunneled central burns line placement;  Surgeon: Pato Gómez  CEE Garcia;  [facility]: [facility]      INSERT CATHETER VASCULAR ACCESS APHERESIS CHILD N/A 6/10/2020    Procedure: Large Bore Double Lumen NON TUNNELED Apheresis Catheter placement;  Surgeon: Link Rios PA-C;  [facility]      INSERT CATHETER VASCULAR ACCESS DOUBLE LUMEN CHILD N/A 10/26/2018    Procedure: double lumen tunneled line placement;  Surgeon: Rex Valente MD;  [facility]      INSERT PORT VASCULAR ACCESS N/A 6/10/2020    Procedure: Port placement;  Surgeon: Link Rios PA-C;  [facility]      IR CHEST PORT PLACEMENT > 5 YRS OF AGE  6/10/2020     IR CVC NON TUNNEL LINE REMOVAL  6/12/2020     IR CVC NON TUNNEL PLACEMENT  6/10/2020     IR CVC TUNNEL PLACEMENT > 5 YRS OF AGE  7/20/2020     IR CVC TUNNEL REMOVAL LEFT  2/8/2019     IR CVC TUNNEL REMOVAL LEFT  8/24/2020     IR PORT REMOVAL LEFT  5/16/2019     O CLAVICLE LEFT Left     fracture with surgical repair and plate/screws     ORTHOPEDIC SURGERY      femur     REMOVE CATHETER VASCULAR ACCESS N/A 2/8/2019    Procedure: Tunneled line removal;  Surgeon: Krystal Esparza MD;  [facility]      REMOVE CATHETER VASCULAR ACCESS N/A 8/24/2020    Procedure: tunneled line removal;  Surgeon: Siri Hernandez PA-C;  [facility]      REMOVE PORT VASCULAR ACCESS N/A 5/16/2019    Procedure: REMOVAL, VASCULAR ACCESS PORT;  Surgeon: Link Rios PA-C;  [facility]      Allergies   Allergen Reactions     Blood Transfusion Related (Informational Only) Other (See Comments)     Patient has a history of a clinically significant antibody against RBC antigens.  A delay in compatible RBCs may occur.Stem cell transplant patient.  Give type O RBCs.  Requires Benadryl and tylenol as premed for platelets and RBCs for hx of hives     Voriconazole Photosensitivity     Significant rash - do not rechallenge      Social History     Tobacco Use     Smoking status:  Never Smoker     Smokeless tobacco: Never Used   Substance Use Topics     Alcohol use: Never     Frequency: Never      Wt Readings from Last 1 Encounters:   01/27/21 63.5 kg (139 lb 15.9 oz)        Anesthesia Evaluation   Pt has had prior anesthetic. Type: General.    No history of anesthetic complications       ROS/MED HX  ENT/Pulmonary:  - neg pulmonary ROS     Neurologic:  - neg neurologic ROS     Cardiovascular:  - neg cardiovascular ROS   (+) -----Previous cardiac testing   Echo: Date: 1/26/21 Results:  Patient after bone marrow transplant. Normal echocardiogram. The left and  right ventricles have normal chamber size, wall thickness, and systolic  function. The calculated biplane left ventricular ejection fraction is 64 %. A  catheter is seen with its tip in the right atrium. No pericardial effusion.  Technically difficult study due to lung artifact.  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 Now in deep remission, preparing for second BMT with [facility].  (+) Malignancy (ALL),     Other:  - neg other ROS          Physical Exam    Airway  airway exam normal      Mallampati: I   TM distance: > 3 FB   Neck ROM: full   Mouth opening: > 3 cm    Respiratory Devices and Support         Dental  no notable dental history         Cardiovascular   cardiovascular exam normal          Pulmonary   pulmonary exam normal                OUTSIDE LABS:  CBC:   Lab Results   Component Value Date    WBC 8.0 01/26/2021    WBC 4.1 09/25/2020     HGB 14.2 01/26/2021    HGB 13.6 09/25/2020    HCT 41.5 01/26/2021    HCT 40.0 09/25/2020     01/26/2021     (L) 09/25/2020     BMP:   Lab Results   Component Value Date     01/26/2021     08/28/2020    POTASSIUM 3.9 01/26/2021    POTASSIUM 3.8 08/28/2020    CHLORIDE 109 01/26/2021    CHLORIDE 109 08/28/2020    CO2 30 01/26/2021    CO2 26 08/28/2020    BUN 11 01/26/2021    BUN 10 08/28/2020    CR 0.66 01/26/2021    CR 0.70 08/28/2020     (H) 01/26/2021     (H) 08/28/2020     COAGS:   Lab Results   Component Value Date    PTT 27 01/26/2021    INR 0.99 01/26/2021    FIBR 187 (L) 08/24/2020     POC:   Lab Results   Component Value Date    BGM 98 08/01/2020     HEPATIC:   Lab Results   Component Value Date    ALBUMIN 3.7 01/26/2021    PROTTOTAL 6.6 (L) 01/26/2021    ALT 39 01/26/2021    AST 27 01/26/2021     (H) 08/11/2020    ALKPHOS 40 01/26/2021    BILITOTAL 0.2 01/26/2021     OTHER:   Lab Results   Component Value Date    ELSA 9.3 01/26/2021    PHOS 4.2 08/26/2020    MAG 1.8 08/28/2020    TSH 4.20 (H) 11/07/2019    T4 0.88 11/07/2019    CRP <2.9 08/24/2020       Anesthesia Plan    ASA Status:  3   NPO Status:  NPO Appropriate    Anesthesia Type: General     - Airway: Native airway   Induction: Intravenous   Maintenance: TIVA.        Consents    Anesthesia Plan(s) and associated risks, benefits, and realistic alternatives discussed. Questions answered and patient/representative(s) expressed understanding.     - Discussed with:  Patient         Postoperative Care       PONV prophylaxis: Background Propofol Infusion     Comments:    - GA/natural airway, LMA/GETA as back-up  - Maintenance: TIVA with propofol    Risks and benefits of anesthetic approach, including but not limited to need for conversion to LMA/ETT, sore throat, hoarseness, mucosal injury, dental injury, bronchospasm/laryngospasm, PONV, aspiration, injury to blood vessels and/ or nerves, hemodynamic and  respiratory issues including potential long term consequences, bleeding, side effects of blood transfusion and postoperative delirium were discussed with parents and all questions were answered.    Buddy Joya MD  Pediatric Staff Anesthesiologist  [facility]  Pager [phone]  Phone [phone]             Buddy Joya MD

## 2021-02-08 NOTE — ANESTHESIA POSTPROCEDURE EVALUATION
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    Anesthesia Type:  General    Note:  Disposition: Admission   Postop Pain Control: Uneventful            Sign Out: Well controlled pain   PONV: No   Neuro/Psych: Uneventful            Sign Out: Acceptable/Baseline neuro status   Airway/Respiratory: Uneventful            Sign Out: Acceptable/Baseline resp. status   CV/Hemodynamics: Uneventful            Sign Out: Acceptable CV status   Other NRE:    DID A NON-ROUTINE EVENT OCCUR?     Event details/Postop Comments:  I personally evaluated the patient at bedside. No anesthesia-related complications noted. Patient is hemodynamically stable with adequate control of pain and nausea. Ready for discharge from PACU. All questions were answered.    Buddy Joya MD  Pediatric Staff Anesthesiologist  [facility]  Pager [phone]  Phone [phone]          Last vitals:  Vitals:    02/08/21 1118 02/08/21 1130 02/08/21 1201   BP: (!) 85/54 (!) 88/53 99/69   Pulse: 52 (!) 43 (!) 44   Resp: 13 12 14   Temp: 36.8  C (98.2  F) 36.6  C (97.8  F) 36.3  C (97.4  F)   SpO2: 99% 100% 100%       Last vitals prior to Anesthesia Care Transfer:  CRNA VITALS  2/8/2021 1046 - 2/8/2021 1146      2/8/2021             Temp:  36.8  C (98.2  F)          Electronically Signed By: Buddy Joya MD  February 8, 2021  12:25 PM

## 2021-02-08 NOTE — PHARMACY-ADMISSION MEDICATION HISTORY
Pediatric BMT medication history for the 2/8/2021 admission is complete. See Epic admission navigator for allergy information, retail pharmacy, prior to admission medications and immunization status.     Pre-BMT pharmacy consult medication history was completed on 1/26 by Francisca Culp.  Please refer to the pharmacy note from that encounter for additional details.    Patient medications, recent/pending drug levels, and any outpatient IV therapies were reviewed and discussed with the admitting POLLO/hospitalist.     Patient preference for medications  Artemio prefers that medication comes as pills    Changes made to PTA medication list   Added: PRN APAP, Prilosec, Tums  Deleted: Voriconazole - he was on fluconazole until 1/12 per [facility]'s ppx regimen, he does not tolerate vori    PTA medications not ordered this admission  Prilosec - will receive IV pantoprazole per SOC    Additional information:  Reviewed [facility]'s records to confirm last pentam dose and accurate medication list    Prior to Admission medications    Medication Sig Last Dose Taking? Auth Provider   acetaminophen (TYLENOL) 325 MG tablet Take 650 mg by mouth every 6 hours as needed for mild pain  Yes Unknown, Entered By History   calcium carbonate (TUMS) 500 MG chewable tablet Take 1 chew tab by mouth 3 times daily as needed for heartburn  Yes Unknown, Entered By History   omeprazole (PRILOSEC) 20 MG DR capsule Take 20 mg by mouth daily  Yes Unknown, Entered By History   pentamidine [id number].  [facility]. 1/19/2021 Yes Viky Zavala MD   valACYclovir (VALTREX) 1000 mg tablet Take 0.5 tablets (500 mg) by mouth 2 times daily 2/7/2021 at Unknown time Yes Viky Zavala MD       Medication history completed by: Margaret Kelly, PharmD

## 2021-02-08 NOTE — OR NURSING
Pt cleared for admission to U4, AVSS. Denies pain. Report called to TYSHAWN Ferguson. Port heparin locked, new Galvan not used since placement but cleared for use per IR.

## 2021-02-08 NOTE — ANESTHESIA CARE TRANSFER NOTE
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    Anesthesia Type:   General     Note:    Oropharynx: oropharynx clear of all foreign objects and spontaneously breathing  Level of Consciousness: drowsy  Oxygen Supplementation: nasal cannula  Level of Supplemental Oxygen (L/min / FiO2): 2  Independent Airway: airway patency satisfactory and stable  Dentition: dentition unchanged  Vital Signs Stable: post-procedure vital signs reviewed and stable  Report to RN Given: handoff report given  Patient transferred to:  Recovery    Handoff Report: Identifed the Patient, Identified the Reponsible Provider, Reviewed the pertinent medical history, Discussed the surgical course, Reviewed Intra-OP anesthesia mangement and issues during anesthesia, Set expectations for post-procedure period and Allowed opportunity for questions and acknowledgement of understanding      Vitals: (Last set prior to Anesthesia Care Transfer)  CRNA VITALS  2/8/2021 1046 - 2/8/2021 1121      2/8/2021             Temp:  36.8  C (98.2  F)        Electronically Signed By: SNOW Quispe CRNA  February 8, 2021  11:21 AM

## 2021-02-08 NOTE — PROCEDURES
[facility]    Procedure: IR CVC TUNNEL PLACEMENT    Date/Time: 2/8/2021 11:14 AM  Performed by: Pato Gómez PA-C  Authorized by: Pato Gómez PA-C     UNIVERSAL PROTOCOL   Site Marked: NA  Prior Images Obtained and Reviewed:  Yes  Required items: Required blood products, implants, devices and special equipment available    Patient identity confirmed:  Hospital-assigned identification number, provided demographic data, arm band and verbally with patient  Patient was reevaluated immediately before administering moderate or deep sedation or anesthesia (Per anesthesia)  Confirmation Checklist:  Patient's identity using two indicators, relevant allergies and procedure was appropriate and matched the consent or emergent situation  Time out: Immediately prior to the procedure a time out was called    Universal Protocol: the Joint Commission Universal Protocol was followed    Preparation: Patient was prepped and draped in usual sterile fashion    ESBL (mL):  2         ANESTHESIA    Anesthesia: Local infiltration  Local Anesthetic:  Lidocaine 1% without epinephrine  Anesthetic Total (mL):  4      SEDATION    Patient Sedated: Yes    Sedation Type:  Deep  Sedation:  See MAR for details and propofol  Vital signs: Vital signs monitored during sedation    Fluoroscopy Time: 1 minute(s)  See dictated procedure note for full details.  Findings: Tolerated native airway general well    Specimens: none    Complications: None    Condition: Stable    Plan: DL TCVC heparin locked and ready for immediate use.    PROCEDURE   Patient Tolerance:  Patient tolerated the procedure well with no immediate complications  Describe Procedure: 9.5 Fr 27 cm double lumen tunneled central venous catheter via left IJ (port on right) with tip in right atrium. Functions well   Monitored anesthesia care  Length of time physician/provider present for 1:1 monitoring during sedation: 0

## 2021-02-08 NOTE — PROGRESS NOTES
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  He is in deep remission and is MRD negative. He is here today with his mother to admit to unit four to begin preparative chemotherapy per MT 2013-09 followed by UCBT.      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 Following HR Consolidation, Interim Maintenance (4 courses of HD Methotrexate) and a course of Cyclophosphamide/Etoposide (with 3 months of Ruxolitinib) he still had positive MRD and proceeded to a matched sib alloBMT with TBI Conditioning and post-transplant Ruxolitinib. Marrow relapse 19 months post-transplant in June 2020, bridged with maintenance chemotherapy and underwent lymphodepletion followed by CD19 CAR T-cell infusion on 7/27/20. 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 His Day 30 marrow MRD was negative by NGS. However, at day 60 he demonstrated 8% relapsed disease, CD22 positive, CD19 dim. He continues to have absolute CD19 B-cellls <1%. Today he is day +35 s/p PLAT-05 CAR-T from [facility] which was completed entirely in the outpatient setting. Overall doing well with no signs or symptoms of CRS or neurotoxicity. Now with loss of CD22 CAR and rejection of CD19 directed CAR. No evidence of leukemia on day +21 procedures.  As this is his second transplant, his risk of organ  toxicity is increased.       Today he reports no recent illness or infection. No CRS, neurotoxicity or infectious complications from [place] CAR-T. He reports eating three meals a day and is not experiencing any nausea, diarrhea or constipation. . Sleeping well with good energy level.  He has not required blood products in approximately three months. He has a history of RBC and platelet transfusion reactions and requires premedications with tylenol and benadryl. He has on occasion also required doses of hydrocortisone. He last received pentamidine on 1/19 in [place] and we will plan to give pentamidine on 2/16, but he can trail bactrim day +28 if his counts are suffcicnet. His only current medication is valacyclovir. He has a port upper right chest that was heparinized today 2/8.     ROS: A complete review of systems is negative except as noted in HPI     Past Oncologic History  Acute lymphoblastic leukemia (ALL) in relapse #1 (Resolved)   6/5/2020 Relapse   Acute lymphoblastic leukemia (ALL) in relapse #1  Peripheral blood, flow cytometry: B-lymphoblastic leukemia/lymphoma approximately 19 months status post stem cell transplant at the [facility]. Immunophenotypic analysis showed an abnormal CD45 population of cells representing approximately 80% of total events analyzed. This population was positive for CD34, HLA-DR, TdT, CD19, CD20 (dim), CD22, CD10, CD52 (dim), CD58 and CD9 (dim). They had dual expression of CD10/CD19 and TdT/CD19.  FISH at relapse #1  ([facility]) on 6/6/2020 PB: Loss of all or part of IKZF1 (91%)  ([facility]) on 7/14/2020 BMA: Loss of all or part of the IKZF1 locus (78%)    6/11/2020 - 9/25/2020 Cellular Therapy   6/11/2020 collected cells for [facility] CAR-T cell therapy  6/16/2020 - 7/12/2020 bridging chemo with VCR, IT Methotrexate, Oral 6MP, and Methotrexate  7/21/2020 - 7/24/2020 lymphodepleting chemo with Cy/Flu  7/27/2020 [facility] infusion  Grade 3 CRS - treated with  toci x 3 doses /dopamine. Neurotoxicity - got methylpred  MRD at d+30 after Kymriah: MRD neg by NGS  MRD at d+60 after Kymriah: 9% CD19 dim, Cd22 positive, NGS negative, FISH negative    Acute lymphoblastic leukemia (ALL) in relapse #2   9/25/2020 Relapse   RELAPSE #2 of B-ALL based on 9% disease by flow at [facility], however NGS negative, FISH negative, single aberrant clone with complex karyotype noted    10/2/2020 - received 4 drug induction (VCR/DEX/DOX/PEG) with bortez per prior TACL study.    11/6 - marrow is flow negative at . Karyotype shows 2 abnormal cells with complex karyotype.    11/16/2020 - Cellular Therapy   PLAT-05  Consented and enrolled on 11/16/2020  Apheresis on 11/18/2020  Pre-LD disease assessments  12/16/2020: BM - MRD negative by flow but poor quality, likely peripheral blood. CNS1  Flu/Cy administered 12/16-12/19  [id number] infusion on 12/22/2020 - 1 x 10^6 CARs/kg     Past Medical History  - ALL  - Grade III CRS following Kymriah  - Palomino-Parkinson-White Syndrome  - C diff November 2020  - Spinal headaches  - GERD  - Weight loss  - Blood product transfusion reactions  - Demedex infestation  - Vitamin B12 deficiency        Past Surgical History  Past Surgical History         Past Surgical History:   Procedure Laterality Date     BONE MARROW BIOPSY, BONE SPECIMEN, NEEDLE/TROCAR Right 11/27/2018     Procedure: bone marrow biopsy;  Surgeon: Jacqueline Fitzgerald NP;  [facility]      BONE MARROW BIOPSY, BONE SPECIMEN, NEEDLE/TROCAR N/A 2/8/2019     Procedure: BIOPSY BONE MARROW;  Surgeon: Lisa Workman NP;  [facility]      BONE MARROW BIOPSY, BONE SPECIMEN, NEEDLE/TROCAR N/A 5/16/2019     Procedure: BIOPSY, BONE MARROW;  Surgeon: Lilia López APRN CNP;  [facility]     BONE MARROW BIOPSY, BONE SPECIMEN, NEEDLE/TROCAR N/A 11/7/2019     Procedure: Bone marrow biopsy;  Surgeon: Jacqueline Shin NP;  [facility]      BONE MARROW BIOPSY, BONE  SPECIMEN, NEEDLE/TROCAR Right 6/10/2020     Procedure: BIOPSY, BONE MARROW;  Surgeon: Candido Han PA-C;  [facility]      BONE MARROW BIOPSY, BONE SPECIMEN, NEEDLE/TROCAR N/A 8/24/2020     Procedure: Bone marrow biopsy;  Surgeon: Jacqueline Shin, NP;  [facility]      BONE MARROW BIOPSY, BONE SPECIMEN, NEEDLE/TROCAR N/A 9/25/2020     Procedure: BIOPSY, BONE MARROW;  Surgeon: Lilia López APRN CNP;  [facility]      ESOPHAGOSCOPY, GASTROSCOPY, DUODENOSCOPY (EGD), COMBINED N/A 2/22/2019     Procedure: Upper endoscopy with biopsy;  Surgeon: Magdalene Hobson MD;  [facility]      INSERT CATHETER VASCULAR ACCESS N/A 7/20/2020     Procedure: NON -APHERESIS Double lumen tunneled central burns line placement;  Surgeon: Pato Góemz PA-C;  [facility]      INSERT CATHETER VASCULAR ACCESS APHERESIS CHILD N/A 6/10/2020     Procedure: Large Bore Double Lumen NON TUNNELED Apheresis Catheter placement;  Surgeon: Link Rios PA-C;  [facility]      INSERT CATHETER VASCULAR ACCESS DOUBLE LUMEN CHILD N/A 10/26/2018     Procedure: double lumen tunneled line placement;  Surgeon: Rex Valente MD;  [facility]      INSERT PORT VASCULAR ACCESS N/A 6/10/2020     Procedure: Port placement;  Surgeon: Link Rios PA-C;  [facility]      IR CHEST PORT PLACEMENT > 5 YRS OF AGE   6/10/2020     IR CVC NON TUNNEL LINE REMOVAL   6/12/2020     IR CVC NON TUNNEL PLACEMENT   6/10/2020     IR CVC TUNNEL PLACEMENT > 5 YRS OF AGE   7/20/2020     IR CVC TUNNEL REMOVAL LEFT   2/8/2019     IR CVC TUNNEL REMOVAL LEFT   8/24/2020     IR PORT REMOVAL LEFT   5/16/2019     O CLAVICLE LEFT Left       fracture with surgical repair and plate/screws     ORTHOPEDIC SURGERY         femur     REMOVE CATHETER VASCULAR ACCESS N/A 2/8/2019     Procedure: Tunneled line removal;  Surgeon: Krystal Esparza  "MD Jessy;  [facility]      REMOVE CATHETER VASCULAR ACCESS N/A 8/24/2020     Procedure: tunneled line removal;  Surgeon: Siri Hernandez PA-C;  [facility]      REMOVE PORT VASCULAR ACCESS N/A 5/16/2019     Procedure: REMOVAL, VASCULAR ACCESS PORT;  Surgeon: Link Rios PA-C;  [facility]         Family History  Sister: MRSA infection  Maternal grandfather: Thrombocytopenia  Paternal grandfather: Prostate cancer, type 2 diabetes.     Social History  Isael will live with his grandparents in [place], [place]. His father and stepmother live locally. His mother is currently in [place], but will be staying inpatient with Artemio during this pre-transplant period.   Artemio parents Dangelo and Mervat are  but amicable. They are both involved in Artemio's care as sources of support. Dangelo is remarried to his wife Camryn and Mervat has a significant other named Jada.   NPO for his VOD ultrasound at midnight tonight.      Medications  Valacyclovir  Pentamidine monthly - needs 2/16     Allergies   Voriconazole (rash)  Chlorhexidine (rash)  Blood product transfusion reaction (hives)     Physical Exam BP 99/69 (Cuff Size: Adult Regular)   Pulse (!) 44   Temp 97.4  F (36.3  C) (Oral)   Resp 14   Ht 1.735 m (5' 8.31\")   Wt 64.5 kg (142 lb 3.2 oz)   SpO2 100%   BMI 21.43 kg/m    GEN: Interactive,pleasant and cooperative. Mother present  HEENT: Head NC/AT, TMs clear bilaterally, PERRL, EOMs intact, sclerae clear, nares patent, OP clear, tongue normal, MMM  NECK: Supple. No cervical or clavicular lymphadenopathy  CARD: RRR, normal S1/S2 without murmur  RESP: Lungs clear to auscultation bilaterally. Normal work of breathing. 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  He is in deep remission and is MRD negative. He received a double lumen burns today, followed by admission to unit 4. He will receive  preparative chemotherapy MT 2013-09 followed an [facility] as a donor.  Now with loss of CD22 CAR and rejection of CD19 directed CAR. No evidence of leukemia on day +21 procedures.  As this is his second transplant, his risk of organ toxicity is increased.     BMT:  #  ALL and BMT: High risk B cell ALL (CNS negative) + JAK2 activation.   - Isael underwent a matched sibling transplant (relapsed at 1.5 years post transplant) , Kymriah (lost B cell aplasia and had new clone at day 60 post Kymriah) and PLAT-05 at [place].  He is in deep remission and is MRD negative.  Now with loss of CD22 CAR and rejection of CD19 directed CAR. No evidence of leukemia on day +21 procedures.  Received CAR-T infusion 7/27 after flu/cy conditioning. Maximum CRS grade III requiring fluids, dopamine, Tociluzimab x3, and steroids. Now off steroids without evidence of CRS or neurotoxicity.   - As this is his second transplant, his risk of organ toxicity is increased.   Prep regimen per MT2013-09 with Thiotepa (days -7 trough -6), Fludarabine (days -5 through -3), Busulfan (days -5 through -3), ATG (days -4 through -2), Rest day (day -1), UCBT (2/16).        #  Risk for GVHD: Prophylaxis with Tacrolimus and MMF beginning day -3.   - Tacrolimus through day +100 then taper if no evidence of GVHD. Close monitoring of drug levels.  - MMF through day +30, or 7 days after engraftment, whichever is later.        FEN/Renal:  # Risk for malnutrition: Currently no  concerns.  Regular diet  - Previously required TPN/SMOF lipids  - Monitor nutritional intake     # Risk for electrolyte abnormalities:    - Check daily electrolytes following admission     # Risk for renal dysfunction and fluid overload:  - GFR (1/28):  The normalized GFR equals 119.11 ml/min.   - Monitor I/O's and daily weights     # Risk for aHUS/TA-TMA: Close monitoring following admission.  - Monitor LDH qMonday  - Monitor urine protein/creatinine qTuesday     Pulmonary:  # Risk for pulmonary insufficiency: No current concerns  - Chest and sinus CTs unremarkable  - PFTs scheduled 1/26     Cardiovascular:  - Hx of asymptomatic WPW syndrome 2018 and hx of bradycardia during CRS. Artemio  does not require SBE prophylaxis for dental or contaminated procedures.  Artemio may be allowed activity ad jose de jesus to his own limits.  Follow-up with an Echo in 6 months.   -Cardiology recommends following NT-proBNP and troponin during the transplant periodically. 2/8 ordered and pending   - Needs a lipid panel which is to be drawn fasting  -Cardiology repeat ECHO in 6 months      # Echocardiogram (1/26): Normal echocardiogram with EF 64 %. A catheter is seen with its tip in the right atrium.      # EKG (1/27): EKG revealed normal sinus rhythm with WPW at a rate of 51 beats/minute.  IA interval was short at 98 msec with pre-excitation preent; QTc interval was normal at 405 msec.  QRS axis was normal at +87 degrees.  There were no ST-T wave changes present.      # Risk for hypertension secondary to medications: Closely monitor following admission.     # Asymptomatic Aaron-Parkinson-White Syndrome. Diagnosed upon syncope in 02/2018. Asymptomatic, no interventions to date  Bradycardia noted during dopamine infusion while inpatient with CRS.      Heme:   # Pancytopenia secondary to chemotherapy  - Transfuse for hemoglobin < 7 , platelets < 10,000  - History of blood product transfusion reactions (hives) to both RBCs and platelets.   -  Premedications with tylenol and benadryl are necessary. Assess need for adding hydrocortisone premed as well.  - GCSF 5 mcg/kg IV daily to begin day +5 per protocol and continue until ANC >/= 2500 x 3 consecutive days.     # Epistaxis: Hx of epistaxis during his last admission.      Infectious Disease:  # Risk for infection given immunocompromised status  Active: None  Prophylaxis: CMV and HSV IgG antibody positive.                                              -- Viral prophylaxis: Valacyclovir continue until Day -4  High dose acyclovir on day -3  -- Fungal: Micafungin on admission. Transition to either itraconazole or posaconazole when able.  -- Bacterial: Levofloxacin Day -3  -- PCP prophylaxis: Pentamidine last administered 1/19/21 in [place].   2/16 Will receive dose of Pentam IV   Bactrim at day +28 if criteria are met.     # Imaging  Chest CT (1/26):  No acute findings within the chest. No finding suspicious  for malignancy.  Sinus CT (1/26): Unremarkable. No evidence of infection.     Past infections:   C. Diff November 2020     GI:   # Nausea management:   -Zofran gtt with initation of chemotherapy   -Ativan prn     # Reflux: Isael has a history of severe reflux with chemotherapy.   -Daily  protonix   -PRN Tums      # Risk for VOD  - Ursodiol TID      Neuro:  # CRS history. 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  - No CRS, neurotoxicity or infectious complications from [place] CAR-T.     # Mucositis: Morphine drip as indicated. Isael also requested magic mouthwash and tylenol for pain.     # Seizure prophylaxis: Keppra during busulfan.     # History of significant spinal headaches     Fertility:  Sperm collected previously collected for fertility preservation     Discharge Considerations: Expected lengths of hospitalization for patients undergoing stem cell transplantation vary by primary diagnosis, conditioning regimen,  graft source, and development of complications. A typical stay is 6 weeks.  The above plan of care was developed by and communicated to me by the   Pediatric BMT attending physician, Dr. Jojo Haider.    Lilia Prabhakar MSN, CPNP-  Pediatric Blood and Marrow Transplant Program  [facility] [phone]  Pager [phone]       Patient Active Problem List   Diagnosis     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)     Aaron-Parkinson-White (WPW) syndrome     Bone marrow transplant candidate     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)     At risk for infection     S/P allogeneic bone marrow transplant (H)     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     Examination of participant or control in clinical research       Pediatric BMT Attending Inpatient Note:  Artemio was seen and evaluated by me today.     The significant interval history includes admitted following CVC placement for an UCBT to consolidate his current remission (CR3) of multiply relapsed pre-B cell ALL. No recent infectious illness concerns.    I have reviewed changes and data from the last 24 hours, including medications, laboratory results, vital signs and pathology results.     I have formulated and discussed the plan with the BMT team.  The relevant clinical topics addressed included the followin21 y/o M w/ h/o multiply relapsed Pre-B cell ALL (s/p MSD BMT, CART-19, CART-22) in CR3 admitted for consolidation of remission with MAC (FluBuTT ATG) followed by an UCBT, risk for malnutrition, risk for nausea, risk for VOD, risk for gastritis, risk for opportunistic infection on micafungin, valtrex and pentamidinerisk for seizures increased on BusulfanWPW with ziopatch on and monitoring pro-BNP and troponins.    I discussed the course and plan with the patient/family and answered all of their questions to the best of my ability.    My care coordination activities today include oversight of  planned lab studies, oversight of medication changes and discussion with BMT team-members.Isael additionally consented to the VOD U/S elastography study ([id number]).    My total floor time today was at least 75 minutes, greater than 50% of which was counseling and coordination of care.    Jojo Haider MD MPH  , Pediatric Blood and Marrow Transplantation  [facility] [phone]

## 2021-02-09 ENCOUNTER — APPOINTMENT (OUTPATIENT)
Dept: PHYSICAL THERAPY | Facility: CLINIC | Age: 21
DRG: 014 | End: 2021-02-09
Attending: PEDIATRICS
Payer: COMMERCIAL

## 2021-02-09 ENCOUNTER — APPOINTMENT (OUTPATIENT)
Dept: ULTRASOUND IMAGING | Facility: CLINIC | Age: 21
DRG: 014 | End: 2021-02-09
Attending: PEDIATRICS
Payer: COMMERCIAL

## 2021-02-09 LAB
ABO + RH BLD: NORMAL
ABO + RH BLD: NORMAL
ANION GAP SERPL CALCULATED.3IONS-SCNC: 6 MMOL/L (ref 3–14)
APTT PPP: 30 SEC (ref 22–37)
BASOPHILS # BLD AUTO: 0 10E9/L (ref 0–0.2)
BASOPHILS NFR BLD AUTO: 0.2 %
BLD GP AB SCN SERPL QL: NORMAL
BLOOD BANK CMNT PATIENT-IMP: NORMAL
BUN SERPL-MCNC: 10 MG/DL (ref 7–30)
CALCIUM SERPL-MCNC: 8.6 MG/DL (ref 8.5–10.1)
CHLORIDE SERPL-SCNC: 107 MMOL/L (ref 94–109)
CHOLEST SERPL-MCNC: 148 MG/DL
CO2 SERPL-SCNC: 27 MMOL/L (ref 20–32)
CREAT SERPL-MCNC: 0.79 MG/DL (ref 0.66–1.25)
DIFFERENTIAL METHOD BLD: ABNORMAL
EOSINOPHIL # BLD AUTO: 0.1 10E9/L (ref 0–0.7)
EOSINOPHIL NFR BLD AUTO: 2.5 %
ERYTHROCYTE [DISTWIDTH] IN BLOOD BY AUTOMATED COUNT: 13.2 % (ref 10–15)
GFR SERPL CREATININE-BSD FRML MDRD: >90 ML/MIN/{1.73_M2}
GLUCOSE SERPL-MCNC: 95 MG/DL (ref 70–99)
HCT VFR BLD AUTO: 38.4 % (ref 40–53)
HDLC SERPL-MCNC: 41 MG/DL
HGB BLD-MCNC: 13.1 G/DL (ref 13.3–17.7)
IMM GRANULOCYTES # BLD: 0 10E9/L (ref 0–0.4)
IMM GRANULOCYTES NFR BLD: 0.2 %
INR PPP: 1.08 (ref 0.86–1.14)
LDLC SERPL CALC-MCNC: 79 MG/DL
LYMPHOCYTES # BLD AUTO: 0.5 10E9/L (ref 0.8–5.3)
LYMPHOCYTES NFR BLD AUTO: 10.6 %
MCH RBC QN AUTO: 32.2 PG (ref 26.5–33)
MCHC RBC AUTO-ENTMCNC: 34.1 G/DL (ref 31.5–36.5)
MCV RBC AUTO: 94 FL (ref 78–100)
MONOCYTES # BLD AUTO: 0.7 10E9/L (ref 0–1.3)
MONOCYTES NFR BLD AUTO: 14.8 %
NEUTROPHILS # BLD AUTO: 3.5 10E9/L (ref 1.6–8.3)
NEUTROPHILS NFR BLD AUTO: 71.7 %
NMDP REPOSITORY: NORMAL
NONHDLC SERPL-MCNC: 107 MG/DL
NRBC # BLD AUTO: 0 10*3/UL
NRBC BLD AUTO-RTO: 0 /100
NT-PROBNP SERPL-MCNC: 219 PG/ML (ref 0–450)
PLATELET # BLD AUTO: 147 10E9/L (ref 150–450)
POTASSIUM SERPL-SCNC: 3.8 MMOL/L (ref 3.4–5.3)
RBC # BLD AUTO: 4.07 10E12/L (ref 4.4–5.9)
SODIUM SERPL-SCNC: 140 MMOL/L (ref 133–144)
SPECIMEN EXP DATE BLD: NORMAL
TRIGL SERPL-MCNC: 142 MG/DL
TROPONIN I SERPL-MCNC: <0.015 UG/L (ref 0–0.04)
WBC # BLD AUTO: 4.8 10E9/L (ref 4–11)

## 2021-02-09 PROCEDURE — 97161 PT EVAL LOW COMPLEX 20 MIN: CPT | Mod: GP

## 2021-02-09 PROCEDURE — 250N000013 HC RX MED GY IP 250 OP 250 PS 637: Performed by: PEDIATRICS

## 2021-02-09 PROCEDURE — 86900 BLOOD TYPING SEROLOGIC ABO: CPT | Performed by: NURSE PRACTITIONER

## 2021-02-09 PROCEDURE — 250N000011 HC RX IP 250 OP 636: Performed by: PEDIATRICS

## 2021-02-09 PROCEDURE — 87102 FUNGUS ISOLATION CULTURE: CPT | Performed by: PEDIATRICS

## 2021-02-09 PROCEDURE — 250N000013 HC RX MED GY IP 250 OP 250 PS 637: Performed by: NURSE PRACTITIONER

## 2021-02-09 PROCEDURE — 80061 LIPID PANEL: CPT | Performed by: PEDIATRICS

## 2021-02-09 PROCEDURE — 84484 ASSAY OF TROPONIN QUANT: CPT | Performed by: PEDIATRICS

## 2021-02-09 PROCEDURE — 250N000011 HC RX IP 250 OP 636: Performed by: NURSE PRACTITIONER

## 2021-02-09 PROCEDURE — 91200 LIVER ELASTOGRAPHY: CPT | Mod: TC

## 2021-02-09 PROCEDURE — 206N000001 HC R&B BMT UMMC

## 2021-02-09 PROCEDURE — 85730 THROMBOPLASTIN TIME PARTIAL: CPT | Performed by: PEDIATRICS

## 2021-02-09 PROCEDURE — 83880 ASSAY OF NATRIURETIC PEPTIDE: CPT | Performed by: PEDIATRICS

## 2021-02-09 PROCEDURE — 99233 SBSQ HOSP IP/OBS HIGH 50: CPT | Performed by: PEDIATRICS

## 2021-02-09 PROCEDURE — 258N000003 HC RX IP 258 OP 636: Performed by: PEDIATRICS

## 2021-02-09 PROCEDURE — 97110 THERAPEUTIC EXERCISES: CPT | Mod: GP

## 2021-02-09 PROCEDURE — 93975 VASCULAR STUDY: CPT | Mod: 26 | Performed by: RADIOLOGY

## 2021-02-09 PROCEDURE — 86901 BLOOD TYPING SEROLOGIC RH(D): CPT | Performed by: NURSE PRACTITIONER

## 2021-02-09 PROCEDURE — 80048 BASIC METABOLIC PNL TOTAL CA: CPT | Performed by: PEDIATRICS

## 2021-02-09 PROCEDURE — 76700 US EXAM ABDOM COMPLETE: CPT

## 2021-02-09 PROCEDURE — 258N000003 HC RX IP 258 OP 636: Performed by: NURSE PRACTITIONER

## 2021-02-09 PROCEDURE — 85025 COMPLETE CBC W/AUTO DIFF WBC: CPT | Performed by: PEDIATRICS

## 2021-02-09 PROCEDURE — 85610 PROTHROMBIN TIME: CPT | Performed by: PEDIATRICS

## 2021-02-09 PROCEDURE — 86850 RBC ANTIBODY SCREEN: CPT | Performed by: NURSE PRACTITIONER

## 2021-02-09 PROCEDURE — 97116 GAIT TRAINING THERAPY: CPT | Mod: GP

## 2021-02-09 RX ADMIN — URSODIOL 600 MG: 300 CAPSULE ORAL at 14:26

## 2021-02-09 RX ADMIN — VALACYCLOVIR HYDROCHLORIDE 500 MG: 500 TABLET, FILM COATED ORAL at 08:39

## 2021-02-09 RX ADMIN — VALACYCLOVIR HYDROCHLORIDE 500 MG: 500 TABLET, FILM COATED ORAL at 19:52

## 2021-02-09 RX ADMIN — URSODIOL 600 MG: 300 CAPSULE ORAL at 08:40

## 2021-02-09 RX ADMIN — THIOTEPA 323 MG: 15 INJECTION, POWDER, LYOPHILIZED, FOR SOLUTION INTRACAVITARY; INTRAVENOUS; INTRAVESICAL at 00:01

## 2021-02-09 RX ADMIN — MICAFUNGIN SODIUM 150 MG: 50 INJECTION, POWDER, LYOPHILIZED, FOR SOLUTION INTRAVENOUS at 13:21

## 2021-02-09 RX ADMIN — URSODIOL 600 MG: 300 CAPSULE ORAL at 19:52

## 2021-02-09 RX ADMIN — PANTOPRAZOLE SODIUM 40 MG: 40 TABLET, DELAYED RELEASE ORAL at 19:52

## 2021-02-09 RX ADMIN — PANTOPRAZOLE SODIUM 40 MG: 40 TABLET, DELAYED RELEASE ORAL at 08:39

## 2021-02-09 ASSESSMENT — MIFFLIN-ST. JEOR: SCORE: 1612.37

## 2021-02-09 NOTE — PROGRESS NOTES
Copied from Chart Review:    PEDIATRIC BLOOD & MARROW TRANSPLANT SOCIAL WORK PSYCHOSOCIAL ASSESSMENT                         Date: 1/27/2021        Assessment of living situation, support system, financial status, functional status, coping abilities/strategies, stressors, need for resources and other social work interventions.        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 Viky Zavala     Nurse Coordinator: Daria Jara        Presenting Information:      Artemio is a 18 year old male patient with Acute Lymphoblastic Leukemia (ALL) who is pursuing curative treatment through a 2nd bone marrow transplant. Artemio will be admitted to the bone marrow transplant unit at the [facility] on Monday Feb 8th to begin his pre-transplant conditioning regimen. Isael had his first transplant at [facility], unfortunately relapsed, underwent Kymriah/CAR-T, and then had a second different T-CAR in [place]. Isael is in a deep remission at this time and his primary oncologist and primary BMT doctor feel that now is the best time to go forward with a second transplant as his best option to completely permanently cure his leukemia.          Special Considerations/Accomodations:      Patient's parents Dangelo and Mervat are  but amicable. They are both involved in Artemio's care as sources of support. Dangelo is remarried to his wife Camryn and Mervat has a significant other named Jada.  explained to Dangelo and Mervat that the care team will be unable to update multiple family members on a daily basis and recommended they  come up with a system to update each other.      Mervat will primarily be inpatient with Isael throughout hospitalization.      Patient's mom was reminded of behavior expectations on the unit related to some situations during previous admissions.     Expectations are as follows:     We don t allow yelling on the unit     We require that all parents speak in a respectful manner to all staff as we accord families that same respect.     Staff and provider work rooms are off limit to family members.     These are expectations we have for all families.     If these expectations are not maintained we will require a mandatory 24 hour leave from the unit for the individual not adhering to these expectations.      If it continues to happen we would require a meeting with members of the inpatient team to re-discuss expectations before allowing a parent to return.           Contact/Legal Info:      Decision Maker(s): Artemio is an adult and able to make his own medical decisions at this time. He wants all medical decisions to go through him and not his parents.     Special Custody Considerations: N/A     Advance Directive: Ash does have an advanced directive that lists both parents as health care agents. Most updated copy requested from  at children's [facility].     Permanent Address: [address] [place] [address] Lives with  paternal grandmother and paternal grandfather     Local Address: [address] [place] [address]   Staying locally with father and step mother until he is greater than 100 days post BMT.     Phone number(s):         Mom - Mervat: [phone]  Dad - Dangelo: [phone]  Step-Luann Cowan: [phone]           Support System/Relationship Status/Family History: Artemio's parents and paternal grandparents are his primary support system. His parents, Mervat and Dangelo, are  and civil, but prefer to visit Artemio at different times and communicate directly with  Artemio versus with each other as much as possible.  Dangelo is remarried and his wife's name is Camryn. Artemio has given verbal permission for his parents and Camryn all to get any information they would like to know. He has also signed a person to person communication consent so that the care team can speak to Isael's parents if they have questions. Edwige explained to all of his parents that the medical team will not be able to update multiple family members per day and that they should try to update each other as much as possible.   Artemio also has two siblings, an older sister and brother. His sister Kamilah lives locally in [place], [place] is  and has one child. His brother Link lives in [place]. Mervat is now living in [place] much of the year with her significant other Jada.    Patient previously had been living in his patnernal grandparents basement. There were some concerns from various family members that this environment was not suitable for a post BMT patient. (reasoning was that it was damp and musty and it was unknown if mold and mildew were present. Fortunately, Artemio decided that going back to live at his grandparents house during post BMT follow up was not a good idea for several reasons, mainly because his grandparents have their own health issues and he didn't want to stress them out by living at their house while also going through his own health issues..  Artemio states he does not have a significant other at this time.        Unique Patient/Family Needs:  Establishing effective communication pattern with patient and his parents.     Spirituality/Lottie Affiliation: Patient does not identify with lottie community       Communication Preferences: Artemio wants all medical communication and decisions to go through him. He prefers direct to the point communication. He does not like to be bothered with examinations and questions if they're not necessary.           Caregiver  "Plan: Parents, Artemio's primary care givers will be Dangelo and step-mom Camryn since he'll be living at their house post hospitalization. Mervat may also assist in this process if they need assistance from her. SWer advised them to come up with a plan based on what was best for Artemio, what his preferences are, and what their family can manage.      Patient & Caregiver Knowledge of Medical Condition and Plan of Care: Aretmio and his family are understanding of his medical condition and plan of care for BMT.     Patient and Caregiver Transplant Goals: Artemio and his parents state they \"Just want to get through it\".           Patient Personality/Communication/Coping/Interests/Activities: approachable, withdrawn and quiet. Patient states that when he doesn't feel good he irish by playing video games, watching movies or TV and sleeping. He also states he's not a morning person and prefers not to be bothered at night or if he's napping. He requested nursing bundle cares when possible. SWer explained that the nurses try to be as accommodating as possible to patient preferences but it is not always possible due to the large number tasks they need to complete every day with every patient.     Patient Education/Developmental Level: Patient graduated from highschool approximately 3 years ago and had been taking some time off before deciding about college. He plans to revisit applying to higher education once he is done with the BMT process.           Logistical Considerations:  Transportation: Private Car  Parking: at this time parents state they can afford to buy monthly parking passes.  Housing: patient and family is local.  Mervat plans to be at the hospital the majority of the time and has several good friends [place] she can stay with if need be.   Financial:   Insurance:   [place] [employer] [place]  Secondary: [facility]  Unique Issues?: Edwige provided in depth " financial assessment. Family does not qualify for any grants at this time due to income and asset status. Family prefers not to have specific details shared in the chart.     Patient/Caregiver Sources of Income/Employment: Patient is not employed at this time due to his frequent treatment needs related to his ALL.   Patient's Dad, Dangelo, owns his own commercial painting business. Since he's the owner he has some flexibility in his schedule and can be away from work regularly. Patient's step mom, Camryn, is a nurse and works at a local elementary school.           Patient/Family Psychosocial Considerations:     Mental Health: Patient's mom has been assessed to struggle with anxiety around patient's medical situation.        Chemical Health: No issues identified       Trauma/Loss/Abuse History: No identified issues       Legal Issues: No issues identified           Clinical Assessment and Recommendations:      Patient and family present as well-informed about and prepared for the treatment process. I did not identify any significant barriers to them managing the demands of treatment.        Concerns: none identified at this time.     Education Provided: Transplant process expectations, Caregiver requirements, Caregiver self-care, Financial issues related to transplant, Financial resources/grants available, Common psychosocial stressors pre/post transplant, Hopsital resources available and Social work role       Interventions Provided:   Education and counseling related to psychosocial issues and resources     Follow up planned:  Psychosocial support        REJI Barnes, [facility]    Pediatric Blood and Marrow Transplant  [phone]  [email or web address]     2/9/2021 12:19 PM

## 2021-02-09 NOTE — H&P
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  He is in deep remission and is MRD negative. He is here today with his mother to admit to unit four to begin preparative chemotherapy per MT 2013-09 followed by UCBT.      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 Following HR Consolidation, Interim Maintenance (4 courses of HD Methotrexate) and a course of Cyclophosphamide/Etoposide (with 3 months of Ruxolitinib) he still had positive MRD and proceeded to a matched sib alloBMT with TBI Conditioning and post-transplant Ruxolitinib. Marrow relapse 19 months post-transplant in June 2020, bridged with maintenance chemotherapy and underwent lymphodepletion followed by CD19 CAR T-cell infusion on 7/27/20. 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 His Day 30 marrow MRD was negative by NGS. However, at day 60 he demonstrated 8% relapsed disease, CD22 positive, CD19 dim. He continues to have absolute CD19 B-cellls <1%. Today he is day +35 s/p PLAT-05 CAR-T from [place] which was completed entirely in the outpatient setting. Overall doing well with no signs or symptoms of CRS or neurotoxicity. Now with loss of CD22 CAR and rejection of CD19 directed CAR. No evidence of leukemia on day +21 procedures.  As this is his second transplant, his risk of organ  toxicity is increased.       Today he reports no recent illness or infection. No CRS, neurotoxicity or infectious complications from [place] CAR-T. He reports eating three meals a day and is not experiencing any nausea, diarrhea or constipation. . Sleeping well with good energy level.  He has not required blood products in approximately three months. He has a history of RBC and platelet transfusion reactions and requires premedications with tylenol and benadryl. He has on occasion also required doses of hydrocortisone. He last received pentamidine on 1/19 in [place] and we will plan to give pentamidine on 2/16, but he can trail bactrim day +28 if his counts are suffcicnet. His only current medication is valacyclovir. He has a port upper right chest that was heparinized today 2/8.     ROS: A complete review of systems is negative except as noted in HPI     Past Oncologic History  Acute lymphoblastic leukemia (ALL) in relapse #1 (Resolved)   6/5/2020 Relapse   Acute lymphoblastic leukemia (ALL) in relapse #1  Peripheral blood, flow cytometry: B-lymphoblastic leukemia/lymphoma approximately 19 months status post stem cell transplant at the [facility]. Immunophenotypic analysis showed an abnormal CD45 population of cells representing approximately 80% of total events analyzed. This population was positive for CD34, HLA-DR, TdT, CD19, CD20 (dim), CD22, CD10, CD52 (dim), CD58 and CD9 (dim). They had dual expression of CD10/CD19 and TdT/CD19.  FISH at relapse #1  ([facility]) on 6/6/2020 PB: Loss of all or part of IKZF1 (91%)  ([facility]) on 7/14/2020 BMA: Loss of all or part of the IKZF1 locus (78%)    6/11/2020 - 9/25/2020 Cellular Therapy   6/11/2020 collected cells for [facility] CAR-T cell therapy  6/16/2020 - 7/12/2020 bridging chemo with VCR, IT Methotrexate, Oral 6MP, and Methotrexate  7/21/2020 - 7/24/2020 lymphodepleting chemo with Cy/Flu  7/27/2020 [facility] infusion  Grade 3 CRS - treated with  toci x 3 doses /dopamine. Neurotoxicity - got methylpred  MRD at d+30 after Kymriah: MRD neg by NGS  MRD at d+60 after Kymriah: 9% CD19 dim, Cd22 positive, NGS negative, FISH negative    Acute lymphoblastic leukemia (ALL) in relapse #2   9/25/2020 Relapse   RELAPSE #2 of B-ALL based on 9% disease by flow at [facility], however NGS negative, FISH negative, single aberrant clone with complex karyotype noted    10/2/2020 - received 4 drug induction (VCR/DEX/DOX/PEG) with bortez per prior TACL study.    11/6 - marrow is flow negative at . Karyotype shows 2 abnormal cells with complex karyotype.    11/16/2020 - Cellular Therapy   PLAT-05  Consented and enrolled on 11/16/2020  Apheresis on 11/18/2020  Pre-LD disease assessments  12/16/2020: BM - MRD negative by flow but poor quality, likely peripheral blood. CNS1  Flu/Cy administered 12/16-12/19  [id number] infusion on 12/22/2020 - 1 x 10^6 CARs/kg     Past Medical History  - ALL  - Grade III CRS following Kymriah  - Palomino-Parkinson-White Syndrome  - C diff November 2020  - Spinal headaches  - GERD  - Weight loss  - Blood product transfusion reactions  - Demedex infestation  - Vitamin B12 deficiency        Past Surgical History  Past Surgical History             Past Surgical History:   Procedure Laterality Date     BONE MARROW BIOPSY, BONE SPECIMEN, NEEDLE/TROCAR Right 11/27/2018     Procedure: bone marrow biopsy;  Surgeon: Jacqueline Fitzgerald NP;  [facility]      BONE MARROW BIOPSY, BONE SPECIMEN, NEEDLE/TROCAR N/A 2/8/2019     Procedure: BIOPSY BONE MARROW;  Surgeon: Lisa Workman NP;  [facility]      BONE MARROW BIOPSY, BONE SPECIMEN, NEEDLE/TROCAR N/A 5/16/2019     Procedure: BIOPSY, BONE MARROW;  Surgeon: Lilia López APRN CNP;  [facility]     BONE MARROW BIOPSY, BONE SPECIMEN, NEEDLE/TROCAR N/A 11/7/2019     Procedure: Bone marrow biopsy;  Surgeon: Jacqueline Shin NP;  [facility]      BONE MARROW BIOPSY, BONE  SPECIMEN, NEEDLE/TROCAR Right 6/10/2020     Procedure: BIOPSY, BONE MARROW;  Surgeon: Candido Han PA-C;  [facility]      BONE MARROW BIOPSY, BONE SPECIMEN, NEEDLE/TROCAR N/A 8/24/2020     Procedure: Bone marrow biopsy;  Surgeon: Jacqueline Shin, NP;  [facility]      BONE MARROW BIOPSY, BONE SPECIMEN, NEEDLE/TROCAR N/A 9/25/2020     Procedure: BIOPSY, BONE MARROW;  Surgeon: Lilia López APRN CNP;  [facility]      ESOPHAGOSCOPY, GASTROSCOPY, DUODENOSCOPY (EGD), COMBINED N/A 2/22/2019     Procedure: Upper endoscopy with biopsy;  Surgeon: Magdalene Hobson MD;  [facility]      INSERT CATHETER VASCULAR ACCESS N/A 7/20/2020     Procedure: NON -APHERESIS Double lumen tunneled central burns line placement;  Surgeon: Pato Gómez PA-C;  [facility]      INSERT CATHETER VASCULAR ACCESS APHERESIS CHILD N/A 6/10/2020     Procedure: Large Bore Double Lumen NON TUNNELED Apheresis Catheter placement;  Surgeon: Link Rios PA-C;  [facility]      INSERT CATHETER VASCULAR ACCESS DOUBLE LUMEN CHILD N/A 10/26/2018     Procedure: double lumen tunneled line placement;  Surgeon: Rex Valente MD;  [facility]      INSERT PORT VASCULAR ACCESS N/A 6/10/2020     Procedure: Port placement;  Surgeon: Link Rios PA-C;  [facility]      IR CHEST PORT PLACEMENT > 5 YRS OF AGE   6/10/2020     IR CVC NON TUNNEL LINE REMOVAL   6/12/2020     IR CVC NON TUNNEL PLACEMENT   6/10/2020     IR CVC TUNNEL PLACEMENT > 5 YRS OF AGE   7/20/2020     IR CVC TUNNEL REMOVAL LEFT   2/8/2019     IR CVC TUNNEL REMOVAL LEFT   8/24/2020     IR PORT REMOVAL LEFT   5/16/2019     O CLAVICLE LEFT Left       fracture with surgical repair and plate/screws     ORTHOPEDIC SURGERY         femur     REMOVE CATHETER VASCULAR ACCESS N/A 2/8/2019     Procedure: Tunneled line removal;  Surgeon: Krystal Esparza  "MD Jessy;  [facility]      REMOVE CATHETER VASCULAR ACCESS N/A 8/24/2020     Procedure: tunneled line removal;  Surgeon: Siri Hernandez PA-C;  [facility]      REMOVE PORT VASCULAR ACCESS N/A 5/16/2019     Procedure: REMOVAL, VASCULAR ACCESS PORT;  Surgeon: Link Rios PA-C;  [facility]         Family History  Sister: MRSA infection  Maternal grandfather: Thrombocytopenia  Paternal grandfather: Prostate cancer, type 2 diabetes.     Social History  Isael will live with his grandparents in [place], [place]. His father and stepmother live locally. His mother is currently in [place], but will be staying inpatient with Artemio during this pre-transplant period.   Artemio parents Dangelo and Mervat are  but amicable. They are both involved in Artemio's care as sources of support. Dangelo is remarried to his wife Camryn and Mervat has a significant other named Jada.   NPO for his VOD ultrasound at midnight tonight.      Medications  Valacyclovir  Pentamidine monthly - needs 2/16     Allergies   Voriconazole (rash)  Chlorhexidine (rash)  Blood product transfusion reaction (hives)     Physical Exam BP 99/69 (Cuff Size: Adult Regular)   Pulse (!) 44   Temp 97.4  F (36.3  C) (Oral)   Resp 14   Ht 1.735 m (5' 8.31\")   Wt 64.5 kg (142 lb 3.2 oz)   SpO2 100%   BMI 21.43 kg/m    GEN: Interactive,pleasant and cooperative. Mother present  HEENT: Head NC/AT, TMs clear bilaterally, PERRL, EOMs intact, sclerae clear, nares patent, OP clear, tongue normal, MMM  NECK: Supple. No cervical or clavicular lymphadenopathy  CARD: RRR, normal S1/S2 without murmur  RESP: Lungs clear to auscultation bilaterally. Normal work of breathing. 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  He is in deep remission and is MRD negative. He received a double lumen burns today, followed by admission to unit 4. He will receive  preparative chemotherapy MT 2013-09 followed an [facility] as a donor.  Now with loss of CD22 CAR and rejection of CD19 directed CAR. No evidence of leukemia on day +21 procedures.  As this is his second transplant, his risk of organ toxicity is increased.     BMT:  #  ALL and BMT: High risk B cell ALL (CNS negative) + JAK2 activation.   - Isael underwent a matched sibling transplant (relapsed at 1.5 years post transplant) , Kymriah (lost B cell aplasia and had new clone at day 60 post Kymriah) and PLAT-05 at [place].  He is in deep remission and is MRD negative.  Now with loss of CD22 CAR and rejection of CD19 directed CAR. No evidence of leukemia on day +21 procedures.  Received CAR-T infusion 7/27 after flu/cy conditioning. Maximum CRS grade III requiring fluids, dopamine, Tociluzimab x3, and steroids. Now off steroids without evidence of CRS or neurotoxicity.   - As this is his second transplant, his risk of organ toxicity is increased.   Prep regimen per MT2013-09 with Thiotepa (days -7 trough -6), Fludarabine (days -5 through -3), Busulfan (days -5 through -3), ATG (days -4 through -2), Rest day (day -1), UCBT (2/16).         #  Risk for GVHD: Prophylaxis with Tacrolimus and MMF beginning day -3.   - Tacrolimus through day +100 then taper if no evidence of GVHD. Close monitoring of drug levels.  - MMF through day +30, or 7 days after engraftment,  whichever is later.        FEN/Renal:  # Risk for malnutrition: Currently no concerns.  Regular diet  - Previously required TPN/SMOF lipids  - Monitor nutritional intake     # Risk for electrolyte abnormalities:    - Check daily electrolytes following admission     # Risk for renal dysfunction and fluid overload:  - GFR (1/28):  The normalized GFR equals 119.11 ml/min.   - Monitor I/O's and daily weights     # Risk for aHUS/TA-TMA: Close monitoring following admission.  - Monitor LDH qMonday  - Monitor urine protein/creatinine qTuesday     Pulmonary:  # Risk for pulmonary insufficiency: No current concerns  - Chest and sinus CTs unremarkable  - PFTs scheduled 1/26     Cardiovascular:  - Hx of asymptomatic WPW syndrome 2018 and hx of bradycardia during CRS. Artemio  does not require SBE prophylaxis for dental or contaminated procedures.  Artemio may be allowed activity ad jose de jesus to his own limits.  Follow-up with an Echo in 6 months.   -Cardiology recommends following NT-proBNP and troponin during the transplant periodically. 2/8 ordered and pending   - Needs a lipid panel which is to be drawn fasting  -Cardiology repeat ECHO in 6 months      # Echocardiogram (1/26): Normal echocardiogram with EF 64 %. A catheter is seen with its tip in the right atrium.      # EKG (1/27): EKG revealed normal sinus rhythm with WPW at a rate of 51 beats/minute.  OH interval was short at 98 msec with pre-excitation preent; QTc interval was normal at 405 msec.  QRS axis was normal at +87 degrees.  There were no ST-T wave changes present.      # Risk for hypertension secondary to medications: Closely monitor following admission.     # Asymptomatic Aaron-Parkinson-White Syndrome. Diagnosed upon syncope in 02/2018. Asymptomatic, no interventions to date  Bradycardia noted during dopamine infusion while inpatient with CRS.      Heme:   # Pancytopenia secondary to chemotherapy  - Transfuse for hemoglobin < 7 , platelets < 10,000  - History of  blood product transfusion reactions (hives) to both RBCs and platelets.   - Premedications with tylenol and benadryl are necessary. Assess need for adding hydrocortisone premed as well.  - GCSF 5 mcg/kg IV daily to begin day +5 per protocol and continue until ANC >/= 2500 x 3 consecutive days.     # Epistaxis: Hx of epistaxis during his last admission.      Infectious Disease:  # Risk for infection given immunocompromised status  Active: None  Prophylaxis: CMV and HSV IgG antibody positive.                                              -- Viral prophylaxis: Valacyclovir continue until Day -4  High dose acyclovir on day -3  -- Fungal: Micafungin on admission. Transition to either itraconazole or posaconazole when able.  -- Bacterial: Levofloxacin Day -3  -- PCP prophylaxis: Pentamidine last administered 1/19/21 in [place].   2/16 Will receive dose of Pentam IV   Bactrim at day +28 if criteria are met.     # Imaging  Chest CT (1/26):  No acute findings within the chest. No finding suspicious  for malignancy.  Sinus CT (1/26): Unremarkable. No evidence of infection.     Past infections:   C. Diff November 2020     GI:   # Nausea management:   -Zofran gtt with initation of chemotherapy   -Ativan prn      # Reflux: Isael has a history of severe reflux with chemotherapy.   -Daily  protonix   -PRN Tums      # Risk for VOD  - Ursodiol TID      Neuro:  # CRS history. 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  - No CRS, neurotoxicity or infectious complications from [place] CAR-T.     # Mucositis: Morphine drip as indicated. Isael also requested magic mouthwash and tylenol for pain.     # Seizure prophylaxis: Keppra during busulfan.     # History of significant spinal headaches     Fertility:  Sperm collected previously collected for fertility preservation     Discharge Considerations: Expected lengths of hospitalization for patients undergoing  stem cell transplantation vary by primary diagnosis, conditioning regimen, graft source, and development of complications. A typical stay is 6 weeks.  The above plan of care was developed by and communicated to me by the   Pediatric BMT attending physician, Dr. Jojo Haider.     Lilia Prabhakar MSN, CPNP-  Pediatric Blood and Marrow Transplant Program  [facility] [phone]  Pager [phone]            Patient Active Problem List   Diagnosis     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)     Aaron-Parkinson-White (WPW) syndrome     Bone marrow transplant candidate     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)     At risk for infection     S/P allogeneic bone marrow transplant (H)     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     Examination of participant or control in clinical research         Pediatric BMT Attending Inpatient Note:  Artemio was seen and evaluated by me today.      The significant interval history includes admitted following CVC placement for an UCBT to consolidate his current remission (CR3) of multiply relapsed pre-B cell ALL. No recent infectious illness concerns.     I have reviewed changes and data from the last 24 hours, including medications, laboratory results, vital signs and pathology results.      I have formulated and discussed the plan with the BMT team.  The relevant clinical topics addressed included the followin21 y/o M w/ h/o multiply relapsed Pre-B cell ALL (s/p MSD BMT, CART-19, CART-22) in CR3 admitted for consolidation of remission with MAC (FluBuTT ATG) followed by an UCBT, risk for malnutrition, risk for nausea, risk for VOD, risk for gastritis, risk for opportunistic infection on micafungin, valtrex and pentamidinerisk for seizures increased on BusulfanWPW with ziopatch on and monitoring pro-BNP and troponins.     I discussed the course and plan with the patient/family and answered all of their questions to  the best of my ability.     My care coordination activities today include oversight of planned lab studies, oversight of medication changes and discussion with BMT team-members.Isael additionally consented to the VOD U/S elastography study ([id number]).     My total floor time today was at least 75 minutes, greater than 50% of which was counseling and coordination of care.     Jojo Haider MD MPH  , Pediatric Blood and Marrow Transplantation  [facility] [phone]

## 2021-02-09 NOTE — PLAN OF CARE
Afebrile. HR 40s - 50s, MD aware. BPs 80s/40s while sleeping. Other VSS. Lung sounds clear. No reports of pain or nausea. Zofran gtt started, tolerating well. Thiotepa given, no issues noted. Pt NPO as of 0000. Good UOP. No family at bedside overnight, mom at bedside on eves. Hourly rounding complete. Will continue to monitor and assess.

## 2021-02-09 NOTE — PLAN OF CARE
Afebrile, VSS, LS clear.  HR trends 40's, low 50's when sleeping.No c/o pain or nausea.  This AM burns placed and Isael arrived on floor following the procedure.  Education/ orientation done and got him settled in his room.  Isael has had the afternoon alone in his room.

## 2021-02-09 NOTE — PROGRESS NOTES
02/09/21 1017       Present No   Language English   Living Environment   Lives With parent(s)   Home Accessibility stairs within home   Number of Stairs, Within Home, Primary 10   Stair Railings inside home   Functional Level Prior   Usual Activity Tolerance good   Current Activity Tolerance good   Activity/Exercise/Self-Care Comment Reports independence with all activity/ADLs   Age appropriate Yes   Developmentally delayed No   Toileting 0-->independent   Bathing 0-->independent   Fall history within last six months no   Which of the above functional risks had a recent onset or change? 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, who has undergone a matched sibling transplant    Precautions/Limitations no known precautions/limitations   Weight-Bearing Status - LUE full weight-bearing   Weight-Bearing Status - RUE full weight-bearing   Weight-Bearing Status - LLE full weight-bearing   Weight-Bearing Status - RLE full weight-bearing   Pain Assessment   Patient Currently in Pain No   Cognitive Status Examination   Orientation orientation to person, place and time   Behavior   Behavior cooperative   Range of Motion (ROM)   Range of Motion Range of Motion is functional   Strength   Manual Muscle Testing Results Strength is functional   Muscle Tone Assessment   Muscle Tone  Tone is within normal limits   Transfer Skills and Mobility   Bed Mobility Comments Independent   Gait   Gait Comments Independent > 1000'    Balance   Balance no deficits were identified   Clinical Impression   Criteria for Skilled  Therapeutic Intervention yes, treatment indicated   PT Diagnosis (PT) At risk for deconditioning during admission   Functional limitations due to impairments impaired mobility   Clinical Presentation Stable/Uncomplicated   Clinical Presentation Rationale clinical judgement; current mobility   Clinical Decision Making (Complexity) Low complexity   Therapy Frequency (PT) 1x/week   Predicted Duration of Therapy Intervention (days/wks) 2/20/21   Anticipated Discharge Disposition home   Risk & Benefits of therapy have been explained Yes   Patient, Family & other staff in agreement with plan of care Yes   Clinical Impression Comments Isael will benefit from IP PT during admission to ensure that he maintains all current strength, balance and endurance.   Total Evaluation Time   Total Evaluation Time (Minutes) 5

## 2021-02-09 NOTE — PROGRESS NOTES
[facility]   PEDIATRIC BMT SOCIAL WORK PROGRESS NOTE  DATA:    and social work student walked a lap around the unit with Isael. Isael stated he is committed to staying active throughout this hospital stay/medical treatment. Isael stated that things are going well and he is all settled in to his hospital room. Isael reported communication with the team has been well so far.     Isael denied having any additional questions, concerns or needs at this time.     INTERVENTION:   Supportive counseling and check-in.      ASSESSMENT:   Isael appeared to be in good spirits and to have developed effective coping skills.    Social workers have no concerns from a psychosocial standpoint.    PLAN:    will provide ongoing psychosocial support to patient and family as needed.      REJI Self Student  Pediatric Blood and Marrow Transplant   [phone]  [email or web address]

## 2021-02-09 NOTE — PROGRESS NOTES
Chemotherapy    Type Given: Thiotepa  Blood return present: Yes   Nausea/Vomiting Present: No, zofran gtt infusing   Additional Medications given: No

## 2021-02-09 NOTE — PROGRESS NOTES
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 He admitted yesterday and received  his first dose of thiotepa last night without any issues. Reports significant reflux which he reported to have worsen with admission, increased his Protonix to BID overnight. NPO this am for VOD ultrasound.     Review of Systems: Pertinent positives include those mentioned in interval events. A complete review of systems was performed and is otherwise negative.      Medications:  Please see MAR    Physical Exam:  Temp:  [98.3  F (36.8  C)-98.7  F (37.1  C)] 98.3  F (36.8  C)  Pulse:  [48-99] 64  Resp:  [14-16] 16  BP: ()/(47-69) 107/69  SpO2:  [97 %-99 %] 98 %  I/O last 3 completed shifts:  In: 1762.9 [P.O.:705; I.V.:475.6; IV Piggyback:582.3]  Out: 1945 [Urine:1945]    GEN: Interactive, pleasant and cooperative.   HEENT: Head NC/AT, TMs clear bilaterally, PERRL, EOMs intact, sclerae clear, nares patent, OP clear, tongue normal, MMM  NECK: Supple. No cervical or clavicular lymphadenopathy  CARD: RRR, normal S1/S2 without murmur  RESP: Lungs clear to auscultation bilaterally. Normal work of breathing. 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  He admitted  yesterday to begin his preparative chemotherapy per MT2013-09 followed by UCBT.      BMT:  #  ALL and BMT: High risk B cell ALL (CNS negative) + JAK2 activation.   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  He is in deep remission and is MRD negative.  Now with loss of CD22 CAR and rejection of CD19 directed CAR. No evidence of leukemia on day +21 procedures.  Received CAR-T infusion 7/27 after flu/cy conditioning. Maximum CRS grade III requiring fluids, dopamine, Tociluzimab x3, and steroids. Now off steroids without evidence of CRS or neurotoxicity.   - As this is his second transplant, his risk of organ toxicity is increased.   Prep regimen per MT2013-09 with Thiotepa (days -7 trough -6), Fludarabine (days -5 through -3), Busulfan (days -5 through -3), ATG (days -4 through -2), Rest day (day -1), UCBT (2/16).       #  Risk for GVHD: Prophylaxis with Tacrolimus and MMF beginning day -3.   - Tacrolimus through day +100 then taper if no evidence of GVHD. Close monitoring of drug levels.  - MMF through day +30, or 7 days after engraftment, whichever is later.        FEN/Renal:  # Risk for malnutrition: Currently no concerns.  Regular diet  - Previously required TPN/SMOF lipids  - Monitor nutritional intake  -CR at admission 0.65, 2/9 Cr 0.79, continue to monitor need for additional fluids     # Risk for electrolyte abnormalities:    - Check daily electrolytes following admission     # Risk for renal dysfunction and fluid overload:  - GFR (1/28):  Normalized GFR equals 119.11 ml/min.   - Monitor I/O's and daily weights     # Risk for aHUS/TA-TMA: Close monitoring following admission.  - Monitor LDH qMonday  - Monitor urine protein/creatinine qTuesday     Pulmonary:  # Risk for pulmonary insufficiency: No current concerns  - Chest and sinus CTs  unremarkable       Cardiovascular:  - Hx of asymptomatic WPW syndrome 2018 and hx of bradycardia during CRS. Artemio  does not require SBE prophylaxis for dental or contaminated procedures.  Artemio may be allowed activity ad jose de jesus to his own limits.  Follow-up with an Echo in 6 months.   -Cardiology recommends following NT-proBNP and troponin during the transplant periodically. Baseline completed 2/9   -2/9 baseline lipid panel completed   -2/10 Please page cardiology to have them read recent results  ZIO patch.   -Cardiology rec repeat ECHO in 6 months      # Echocardiogram (1/26): Normal echocardiogram with EF 64 %.  Catheter is seen with its tip in the right atrium.      # EKG (1/27): EKG revealed normal sinus rhythm with WPW at a rate of 51 beats/minute.    RI interval was short at 98 msec with pre-excitation preent; QTc interval was normal at 405 msec.  QRS axis was normal at +87 degrees.  There were no ST-T wave changes present.      # Risk for hypertension secondary to medications: Closely monitor following admission.     # Asymptomatic Aaron-Parkinson-White Syndrome. Diagnosed upon syncope in 02/2018. Asymptomatic, no interventions to date  Bradycardia noted during dopamine infusion while inpatient with CRS.      Heme:   # Pancytopenia secondary to chemotherapy  - Transfuse for hemoglobin < 7 , platelets < 10,000  - History of blood product transfusion reactions (hives) to both RBCs and platelets.   - Premedications with tylenol and benadryl are necessary. Assess need for adding hydrocortisone premed as well.  - GCSF 5 mcg/kg IV daily to begin day +5 per protocol and continue until ANC >/= 2500 x 3 consecutive days.     # Epistaxis: Hx of epistaxis during his last admission.      Infectious Disease:  # Risk for infection given immunocompromised status  Active: None  Prophylaxis: CMV and HSV IgG antibody positive.                                              -- Viral prophylaxis: Valacyclovir continue until  Day -4  High Dose Acyclovir on Day -3  -- Fungal: Micafungin on admission. Transition to either itraconazole or posaconazole when able.  -- Bacterial: Levofloxacin Day -1  -- PCP prophylaxis: Pentamidine last administered 1/19/21 in [facility].   - 2/16 Will receive dose of Pentam IV -ordered   Bactrim at day +28 if criteria are met. No history of adverse reaction      # Imaging  Chest CT (1/26):  No acute findings within the chest. No finding suspicious  for malignancy.  Sinus CT (1/26): Unremarkable. No evidence of infection.     Past infections:   C. Diff November 2020     GI:   # Nausea management:   -Zofran gtt with initation of chemotherapy   -Ativan prn      # Reflux: Isael has a history of severe reflux with chemotherapy.   -BID protonix   -PRN Tums      # Risk for VOD  - Ursodiol TID      Neuro:  # CRS history. 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  - No CRS, neurotoxicity or infectious complications from [facility] CAR-T.     # Mucositis: Morphine drip as indicated. Isael also requested magic mouthwash and tylenol for pain.     # Seizure prophylaxis: Keppra during busulfan.     # History of significant spinal headaches     Fertility:  Sperm collected previously collected for fertility preservation     Discharge Considerations: Expected lengths of hospitalization for patients undergoing stem cell transplantation vary by primary diagnosis, conditioning regimen, graft source, and development of complications. A typical stay is 6 weeks.  The above plan of care was developed by and communicated to me by the   Pediatric BMT attending physician, Dr. Jojo Haider.     Lilia Prabhakar MSN, CPNP-  Pediatric Blood and Marrow Transplant Program  [facility] [phone]  Pager [phone]            Patient Active Problem List   Diagnosis     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)     Aaron-Parkinson-White (WPW) syndrome      Bone marrow transplant candidate     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)     At risk for infection     S/P allogeneic bone marrow transplant (H)     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     Examination of participant or control in clinical research          The above plan of care was developed by and communicated to me by the   Pediatric BMT attending physician, Jojo Haider      Patient Active Problem List   Diagnosis     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)     Aaron-Parkinson-White (WPW) syndrome     Bone marrow transplant candidate     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)     At risk for infection     S/P allogeneic bone marrow transplant (H)     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     Examination of participant or control in clinical research     Pediatric BMT Attending Inpatient Note:  Artemio was seen and evaluated by me today.      The significant interval history includes started conditioning regimen with thiotepa. Mild tenderness at CVC site. Remains afebrile and otherwise stable without complaints/concerns.     I have reviewed changes and data from the last 24 hours, including medications, laboratory results, vital signs and pathology results.      I have formulated and discussed the plan with the BMT team. The relevant clinical topics addressed included the followin21 y/o M w/ h/o multiply relapsed Pre-B cell ALL (s/p MSD BMT, CART-19, CART-22) in CR3 admitted for consolidation of remission with MAC (FluBuTT ATG) followed by an UCBT, risk for malnutrition, risk for nausea, risk for VOD, risk for gastritis, risk for opportunistic infection on micafungin, valtrex and pentamidine, risk for seizures increased on Busulfan, WPW, monitoring pro-BNP and troponins, recent Ziopatch read pending.     I discussed the course and plan with the patient/family and answered  all of their questions to the best of my ability.     My care coordination activities today include oversight of planned lab studies, oversight of medication changes and discussion with BMT team-members.      My total floor time today was at least 45 minutes, greater than 50% of which was counseling and coordination of care.     Jojo Haider MD MPH  , Pediatric Blood and Marrow Transplantation  [facility] [phone]

## 2021-02-09 NOTE — PLAN OF CARE
Afebrile, VSS.  He has a very low resting HR, 48-55.  Drs aware.  BP 95/62.  Thiotepa bath done at 1200.  CVC site CDI, Dressing changed after bath.  Zofran drip continuous, no c/o nausea. Voiding. Ate large lunch after US completed. Walked X 2 with PT or nurse.  Playing video games.  Parents have visited. Updated on POC.

## 2021-02-10 LAB
ALBUMIN UR-MCNC: NEGATIVE MG/DL
ANION GAP SERPL CALCULATED.3IONS-SCNC: 6 MMOL/L (ref 3–14)
APPEARANCE UR: CLEAR
BASOPHILS # BLD AUTO: 0 10E9/L (ref 0–0.2)
BASOPHILS NFR BLD AUTO: 0.2 %
BILIRUB UR QL STRIP: NEGATIVE
BUN SERPL-MCNC: 12 MG/DL (ref 7–30)
CALCIUM SERPL-MCNC: 8.5 MG/DL (ref 8.5–10.1)
CHLORIDE SERPL-SCNC: 108 MMOL/L (ref 94–109)
CMV DNA SPEC NAA+PROBE-ACNC: NORMAL [IU]/ML
CMV DNA SPEC NAA+PROBE-LOG#: NORMAL {LOG_IU}/ML
CO2 SERPL-SCNC: 25 MMOL/L (ref 20–32)
COLOR UR AUTO: YELLOW
CREAT SERPL-MCNC: 0.84 MG/DL (ref 0.66–1.25)
CREAT UR-MCNC: 141 MG/DL
DIFFERENTIAL METHOD BLD: ABNORMAL
EOSINOPHIL # BLD AUTO: 0.1 10E9/L (ref 0–0.7)
EOSINOPHIL NFR BLD AUTO: 1.6 %
ERYTHROCYTE [DISTWIDTH] IN BLOOD BY AUTOMATED COUNT: 13.2 % (ref 10–15)
GFR SERPL CREATININE-BSD FRML MDRD: >90 ML/MIN/{1.73_M2}
GLUCOSE SERPL-MCNC: 104 MG/DL (ref 70–99)
GLUCOSE UR STRIP-MCNC: NEGATIVE MG/DL
HCT VFR BLD AUTO: 40.4 % (ref 40–53)
HGB BLD-MCNC: 13.7 G/DL (ref 13.3–17.7)
HGB UR QL STRIP: NEGATIVE
IGG SERPL-MCNC: 496 MG/DL (ref 610–1616)
IMM GRANULOCYTES # BLD: 0 10E9/L (ref 0–0.4)
IMM GRANULOCYTES NFR BLD: 0.4 %
KETONES UR STRIP-MCNC: NEGATIVE MG/DL
LEUKOCYTE ESTERASE UR QL STRIP: NEGATIVE
LYMPHOCYTES # BLD AUTO: 0.3 10E9/L (ref 0.8–5.3)
LYMPHOCYTES NFR BLD AUTO: 4.8 %
MAGNESIUM SERPL-MCNC: 2 MG/DL (ref 1.6–2.3)
MCH RBC QN AUTO: 32 PG (ref 26.5–33)
MCHC RBC AUTO-ENTMCNC: 33.9 G/DL (ref 31.5–36.5)
MCV RBC AUTO: 94 FL (ref 78–100)
MONOCYTES # BLD AUTO: 0.6 10E9/L (ref 0–1.3)
MONOCYTES NFR BLD AUTO: 10.5 %
NEUTROPHILS # BLD AUTO: 4.6 10E9/L (ref 1.6–8.3)
NEUTROPHILS NFR BLD AUTO: 82.5 %
NITRATE UR QL: NEGATIVE
NRBC # BLD AUTO: 0 10*3/UL
NRBC BLD AUTO-RTO: 0 /100
PH UR STRIP: 5.5 PH (ref 5–7)
PLATELET # BLD AUTO: 147 10E9/L (ref 150–450)
POTASSIUM SERPL-SCNC: 3.6 MMOL/L (ref 3.4–5.3)
PROT UR-MCNC: 0.1 G/L
PROT/CREAT 24H UR: 0.07 G/G CR (ref 0–0.2)
RBC # BLD AUTO: 4.28 10E12/L (ref 4.4–5.9)
SODIUM SERPL-SCNC: 139 MMOL/L (ref 133–144)
SOURCE: NORMAL
SP GR UR STRIP: 1.02 (ref 1–1.03)
SPECIMEN SOURCE: NORMAL
UROBILINOGEN UR STRIP-MCNC: NORMAL MG/DL (ref 0–2)
WBC # BLD AUTO: 5.6 10E9/L (ref 4–11)

## 2021-02-10 PROCEDURE — 250N000013 HC RX MED GY IP 250 OP 250 PS 637: Performed by: NURSE PRACTITIONER

## 2021-02-10 PROCEDURE — 250N000011 HC RX IP 250 OP 636: Performed by: PEDIATRICS

## 2021-02-10 PROCEDURE — 84156 ASSAY OF PROTEIN URINE: CPT | Performed by: PEDIATRICS

## 2021-02-10 PROCEDURE — 99233 SBSQ HOSP IP/OBS HIGH 50: CPT | Performed by: PEDIATRICS

## 2021-02-10 PROCEDURE — 250N000013 HC RX MED GY IP 250 OP 250 PS 637: Performed by: PEDIATRICS

## 2021-02-10 PROCEDURE — 85025 COMPLETE CBC W/AUTO DIFF WBC: CPT | Performed by: PEDIATRICS

## 2021-02-10 PROCEDURE — 80048 BASIC METABOLIC PNL TOTAL CA: CPT | Performed by: PEDIATRICS

## 2021-02-10 PROCEDURE — 206N000001 HC R&B BMT UMMC

## 2021-02-10 PROCEDURE — 82784 ASSAY IGA/IGD/IGG/IGM EACH: CPT | Performed by: PEDIATRICS

## 2021-02-10 PROCEDURE — 258N000003 HC RX IP 258 OP 636: Performed by: NURSE PRACTITIONER

## 2021-02-10 PROCEDURE — 250N000011 HC RX IP 250 OP 636: Performed by: NURSE PRACTITIONER

## 2021-02-10 PROCEDURE — 83735 ASSAY OF MAGNESIUM: CPT | Performed by: PEDIATRICS

## 2021-02-10 PROCEDURE — 81003 URINALYSIS AUTO W/O SCOPE: CPT | Performed by: PEDIATRICS

## 2021-02-10 PROCEDURE — 258N000003 HC RX IP 258 OP 636: Performed by: PEDIATRICS

## 2021-02-10 RX ORDER — SULFAMETHOXAZOLE AND TRIMETHOPRIM 400; 80 MG/1; MG/1
2.5 TABLET ORAL
Status: DISCONTINUED | OUTPATIENT
Start: 2021-03-16 | End: 2021-03-15 | Stop reason: HOSPADM

## 2021-02-10 RX ORDER — ACYCLOVIR 200 MG/1
18 CAPSULE ORAL
Status: DISCONTINUED | OUTPATIENT
Start: 2021-02-12 | End: 2021-02-10

## 2021-02-10 RX ADMIN — THIOTEPA 323 MG: 15 INJECTION, POWDER, LYOPHILIZED, FOR SOLUTION INTRACAVITARY; INTRAVENOUS; INTRAVESICAL at 00:15

## 2021-02-10 RX ADMIN — MICAFUNGIN SODIUM 150 MG: 50 INJECTION, POWDER, LYOPHILIZED, FOR SOLUTION INTRAVENOUS at 12:32

## 2021-02-10 RX ADMIN — PANTOPRAZOLE SODIUM 40 MG: 40 TABLET, DELAYED RELEASE ORAL at 08:10

## 2021-02-10 RX ADMIN — URSODIOL 600 MG: 300 CAPSULE ORAL at 13:22

## 2021-02-10 RX ADMIN — VALACYCLOVIR HYDROCHLORIDE 500 MG: 500 TABLET, FILM COATED ORAL at 19:38

## 2021-02-10 RX ADMIN — DIPHENHYDRAMINE HYDROCHLORIDE 50 MG: 50 INJECTION, SOLUTION INTRAMUSCULAR; INTRAVENOUS at 23:45

## 2021-02-10 RX ADMIN — URSODIOL 600 MG: 300 CAPSULE ORAL at 08:10

## 2021-02-10 RX ADMIN — PANTOPRAZOLE SODIUM 40 MG: 40 TABLET, DELAYED RELEASE ORAL at 19:38

## 2021-02-10 RX ADMIN — LEVETIRACETAM 500 MG: 500 TABLET, FILM COATED ORAL at 19:38

## 2021-02-10 RX ADMIN — ONDANSETRON 1 MG/HR: 2 INJECTION INTRAMUSCULAR; INTRAVENOUS at 23:45

## 2021-02-10 RX ADMIN — URSODIOL 600 MG: 300 CAPSULE ORAL at 19:38

## 2021-02-10 RX ADMIN — VALACYCLOVIR HYDROCHLORIDE 500 MG: 500 TABLET, FILM COATED ORAL at 08:10

## 2021-02-10 ASSESSMENT — MIFFLIN-ST. JEOR: SCORE: 1609.37

## 2021-02-10 NOTE — DISCHARGE SUMMARY
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  He is in deep remission and is MRD negative. He is here today with his mother to admit to unit four to begin preparative chemotherapy per MT 2013-09 followed by UCBT.      Isael was diagnosed at age 17 with Ph-like ([id number] fusion, loss of IKZF1), which was highly resistant to initial treatment with Induction failure promoting an immediate transition to Inotuzumab, which resulted in a decline in MRD from 31% to 0.25%. Following HR Consolidation, Interim Maintenance (4 courses of HD Methotrexate) and a course of Cyclophosphamide/Etoposide (with 3 months of Ruxolitinib) he still had positive MRD and proceeded to a matched sib alloBMT with TBI Conditioning and post-transplant Ruxolitinib. Marrow relapse 19 months post-transplant in June 2020, bridged with maintenance chemotherapy and underwent lymphodepletion followed by CD19 CAR T-cell infusion on 7/27/20. 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 His Day 30 marrow MRD was negative by NGS. However, at day 60 he demonstrated 8% relapsed disease, CD22 positive, CD19 dim. He continues to have absolute CD19 B-cellls <1%. Today he is day +35 s/p PLAT-05 CAR-T from [place] which was completed entirely in the outpatient setting. Overall doing well with no signs or symptoms of CRS or neurotoxicity. Now with loss of CD22 CAR and rejection of CD19 directed CAR. No  evidence of leukemia on day +21 procedures.  As this is his second transplant, his risk of organ toxicity is increased.       Today he reports no recent illness or infection. No CRS, neurotoxicity or infectious complications from [place] CAR-T. He reports eating three meals a day and is not experiencing any nausea, diarrhea or constipation. . Sleeping well with good energy level.  He has not required blood products in approximately three months. He has a history of RBC and platelet transfusion reactions and requires premedications with tylenol and benadryl. He has on occasion also required doses of hydrocortisone. He last received pentamidine on 1/19 in [place] and we will plan to give pentamidine on 2/16, but he can trail bactrim day +28 if his counts are suffcicnet. His only current medication is valacyclovir. He has a port upper right chest that was heparinized today 2/8.    Past Oncologic History  Acute lymphoblastic leukemia (ALL) in relapse #1 (Resolved)   6/5/2020 Relapse   Acute lymphoblastic leukemia (ALL) in relapse #1  Peripheral blood, flow cytometry: B-lymphoblastic leukemia/lymphoma approximately 19 months status post stem cell transplant at the [facility]. Immunophenotypic analysis showed an abnormal CD45 population of cells representing approximately 80% of total events analyzed. This population was positive for CD34, HLA-DR, TdT, CD19, CD20 (dim), CD22, CD10, CD52 (dim), CD58 and CD9 (dim). They had dual expression of CD10/CD19 and TdT/CD19.  FISH at relapse #1  ([facility]) on 6/6/2020 PB: Loss of all or part of IKZF1 (91%)  ([facility]) on 7/14/2020 BMA: Loss of all or part of the IKZF1 locus (78%)    6/11/2020 - 9/25/2020 Cellular Therapy   6/11/2020 collected cells for [facility] CAR-T cell therapy  6/16/2020 - 7/12/2020 bridging chemo with VCR, IT Methotrexate, Oral 6MP, and Methotrexate  7/21/2020 - 7/24/2020 lymphodepleting chemo with Cy/Flu  7/27/2020 Kymria infusion  Grade 3  CRS - treated with toci x 3 doses /dopamine. Neurotoxicity - got methylpred  MRD at d+30 after Kymriah: MRD neg by NGS  MRD at d+60 after Kymriah: 9% CD19 dim, Cd22 positive, NGS negative, FISH negative    Acute lymphoblastic leukemia (ALL) in relapse #2   9/25/2020 Relapse   RELAPSE #2 of B-ALL based on 9% disease by flow at [facility], however NGS negative, FISH negative, single aberrant clone with complex karyotype noted    10/2/2020 - received 4 drug induction (VCR/DEX/DOX/PEG) with bortez per prior TACL study.    11/6 - marrow is flow negative at . Karyotype shows 2 abnormal cells with complex karyotype.    11/16/2020 - Cellular Therapy   PLAT-05  Consented and enrolled on 11/16/2020  Apheresis on 11/18/2020  Pre-LD disease assessments  12/16/2020: BM - MRD negative by flow but poor quality, likely peripheral blood. CNS1  Flu/Cy administered 12/16-12/19  [id number] infusion on 12/22/2020 - 1 x 10^6 CARs/kg     Past Medical History  - ALL  - Grade III CRS following Kymriah  - Palomino-Parkinson-White Syndrome  - C diff November 2020  - Spinal headaches  - GERD  - Weight loss  - Blood product transfusion reactions  - Demedex infestation  - Vitamin B12 deficiency    Past Surgical History    Past Surgical History:   Procedure Laterality Date     BONE MARROW BIOPSY, BONE SPECIMEN, NEEDLE/TROCAR Right 11/27/2018    Procedure: bone marrow biopsy;  Surgeon: Jacqueline Fitzgerald NP;  [facility]      BONE MARROW BIOPSY, BONE SPECIMEN, NEEDLE/TROCAR N/A 2/8/2019    Procedure: BIOPSY BONE MARROW;  Surgeon: Lisa Workman NP;  [facility]      BONE MARROW BIOPSY, BONE SPECIMEN, NEEDLE/TROCAR N/A 5/16/2019    Procedure: BIOPSY, BONE MARROW;  Surgeon: Lilia López APRN CNP;  [facility]     BONE MARROW BIOPSY, BONE SPECIMEN, NEEDLE/TROCAR N/A 11/7/2019    Procedure: Bone marrow biopsy;  Surgeon: Jacqueline Shin NP;  [facility]      BONE MARROW BIOPSY, BONE SPECIMEN,  NEEDLE/TROCAR Right 6/10/2020    Procedure: BIOPSY, BONE MARROW;  Surgeon: Candido Han PA-C;  [facility]      BONE MARROW BIOPSY, BONE SPECIMEN, NEEDLE/TROCAR N/A 8/24/2020    Procedure: Bone marrow biopsy;  Surgeon: Jacqueline Shin, NP;  [facility]      BONE MARROW BIOPSY, BONE SPECIMEN, NEEDLE/TROCAR N/A 9/25/2020    Procedure: BIOPSY, BONE MARROW;  Surgeon: Lilia López APRN CNP;  [facility]      ESOPHAGOSCOPY, GASTROSCOPY, DUODENOSCOPY (EGD), COMBINED N/A 2/22/2019    Procedure: Upper endoscopy with biopsy;  Surgeon: Magdalene Hobson MD;  [facility]      INSERT CATHETER VASCULAR ACCESS N/A 7/20/2020    Procedure: NON -APHERESIS Double lumen tunneled central burns line placement;  Surgeon: Pato Gómez PA-C;  [facility]      INSERT CATHETER VASCULAR ACCESS N/A 2/8/2021    Procedure: apheresis catheter placement;  Surgeon: Pato Gómez PA-C;  [facility]      INSERT CATHETER VASCULAR ACCESS APHERESIS CHILD N/A 6/10/2020    Procedure: Large Bore Double Lumen NON TUNNELED Apheresis Catheter placement;  Surgeon: Link Rios PA-C;  [facility]      INSERT CATHETER VASCULAR ACCESS DOUBLE LUMEN CHILD N/A 10/26/2018    Procedure: double lumen tunneled line placement;  Surgeon: Rex Valente MD;  [facility]      INSERT PORT VASCULAR ACCESS N/A 6/10/2020    Procedure: Port placement;  Surgeon: Link Rios PA-C;  [facility]      IR CHEST PORT PLACEMENT > 5 YRS OF AGE  6/10/2020     IR CVC NON TUNNEL LINE REMOVAL  6/12/2020     IR CVC NON TUNNEL PLACEMENT  6/10/2020     IR CVC TUNNEL PLACEMENT > 5 YRS OF AGE  7/20/2020     IR CVC TUNNEL PLACEMENT > 5 YRS OF AGE  2/8/2021     IR CVC TUNNEL REMOVAL LEFT  2/8/2019     IR CVC TUNNEL REMOVAL LEFT  8/24/2020     IR PORT REMOVAL LEFT  5/16/2019     O CLAVICLE LEFT Left     fracture with  surgical repair and plate/screws     ORTHOPEDIC SURGERY      femur     REMOVE CATHETER VASCULAR ACCESS N/A 2/8/2019    Procedure: Tunneled line removal;  Surgeon: Krystal Esparza MD;  [facility]      REMOVE CATHETER VASCULAR ACCESS N/A 8/24/2020    Procedure: tunneled line removal;  Surgeon: Siri Hernandez PA-C;  [facility]      REMOVE PORT VASCULAR ACCESS N/A 5/16/2019    Procedure: REMOVAL, VASCULAR ACCESS PORT;  Surgeon: Link Rios PA-C;  [facility]     Family History  Sister: MRSA infection  Maternal grandfather: Thrombocytopenia  Paternal grandfather: Prostate cancer, type 2 diabetes.     Social History  Isael will live with his grandparents in [place], [place]. His father and stepmother live locally. His mother is currently in [place], but will be staying inpatient with Artemio during this pre-transplant period.   Artemio parents Dangelo and Mervat are  but amicable. They are both involved in Artemio's care as sources of support. Dangelo is remarried to his wife Camryn and Mervat has a significant other named Jada.     Discharge Medications:   Valacyclovir 1000 mg/ 1 tablet TID   Itraconazole 100 mg /tablet: Take 200 mg/2 tablets twice a day  Ativan 1 mg/one tablet twice a day   Lidocaine 4% patch 1 patch q 24 to left Deltoid  Oxycodone 5mg/1 tab every 6 hours as needed for pain  Bactrim 400mg -80mg: Bactrim 160 mg/ 2 tablets twice a day on Monday and Tuesday if WBC is sufficient and +28  Calcium Carbonate 500 mg/1 tablet as needed for heartburn     Allergies      Allergies   Allergen Reactions     Blood Transfusion Related (Informational Only) Other (See Comments)     Patient has a history of a clinically significant antibody against RBC antigens.  A delay in compatible RBCs may occur.Stem cell transplant patient.  Give type O RBCs.  Requires Benadryl and tylenol as premed for platelets and RBCs for hx of hives     Voriconazole Photosensitivity      "Significant rash - do not rechallenge       Discharge Physical Exam   /60   Pulse 96   Temp 98.7  F (37.1  C) (Oral)   Resp 18   Ht 1.735 m (5' 8.31\")   Wt 63.8 kg (140 lb 10.5 oz)   SpO2 98%   BMI 21.19 kg/m      GEN: lying in bed, waking up. Attentive. NAD. Mom and father at bedside.   HEENT: Head NC/AT, PERRL, EOMs intact, sclerae clear, nares patent, MMM.   CARD: RRR, no murmur.  RESP: Lungs clear to auscultation bilaterally. Diminished in the bases. Normal work of breathing.  ABD: Soft, NT, ND  EXTREM: WWP, MAEE  SKIN: Dry skin, without rashes or bruising.   NEURO: No focal deficits  ACCESS: Double lumen burns, port (not accessed)    Discharge Labwork:      Ref. Range 3/15/2021 00:30   Sodium Latest Ref Range: 133 - 144 mmol/L 137   Potassium Latest Ref Range: 3.4 - 5.3 mmol/L 3.8   Chloride Latest Ref Range: 94 - 109 mmol/L 109   Carbon Dioxide Latest Ref Range: 20 - 32 mmol/L 20   Urea Nitrogen Latest Ref Range: 7 - 30 mg/dL 20   Creatinine Latest Ref Range: 0.66 - 1.25 mg/dL 0.63 (L)   GFR Estimate Latest Ref Range: >60 mL/min/1.73_m2 >90   GFR Estimate If Black Latest Ref Range: >60 mL/min/1.73_m2 >90   Calcium Latest Ref Range: 8.5 - 10.1 mg/dL 9.2   Anion Gap Latest Ref Range: 3 - 14 mmol/L 8   Magnesium Latest Ref Range: 1.6 - 2.3 mg/dL 1.6   Phosphorus Latest Ref Range: 2.5 - 4.5 mg/dL 4.2   Albumin Latest Ref Range: 3.4 - 5.0 g/dL 3.2 (L)   Prealbumin Latest Ref Range: 15 - 45 mg/dL 22   Protein Total Latest Ref Range: 6.8 - 8.8 g/dL 6.6 (L)   Bilirubin Total Latest Ref Range: 0.2 - 1.3 mg/dL 0.6   Alkaline Phosphatase Latest Ref Range: 40 - 150 U/L 90   ALT Latest Ref Range: 0 - 70 U/L 40   AST Latest Ref Range: 0 - 45 U/L 24   Bilirubin Direct Latest Ref Range: 0.0 - 0.2 mg/dL 0.2   Lactate Dehydrogenase Latest Ref Range: 85 - 227 U/L 177   Triglycerides Latest Ref Range: <150 mg/dL 136   Glucose Latest Ref Range: 70 - 99 mg/dL 122 (H)   WBC Latest Ref Range: 4.0 - 11.0 10e9/L 4.0 "   Hemoglobin Latest Ref Range: 13.3 - 17.7 g/dL 10.6 (L)   Hematocrit Latest Ref Range: 40.0 - 53.0 % 30.4 (L)   Platelet Count Latest Ref Range: 150 - 450 10e9/L 13 (LL)   RBC Count Latest Ref Range: 4.4 - 5.9 10e12/L 3.51 (L)   MCV Latest Ref Range: 78 - 100 fl 87   MCH Latest Ref Range: 26.5 - 33.0 pg 30.2   MCHC Latest Ref Range: 31.5 - 36.5 g/dL 34.9   RDW Latest Ref Range: 10.0 - 15.0 % 11.9   Diff Method Unknown Manual Differential   % Neutrophils Latest Units: % 63.0   % Lymphocytes Latest Units: % 13.9   % Monocytes Latest Units: % 23.1   % Eosinophils Latest Units: % 0.0   % Basophils Latest Units: % 0.0   Absolute Neutrophil Latest Ref Range: 1.6 - 8.3 10e9/L 2.5   Absolute Lymphocytes Latest Ref Range: 0.8 - 5.3 10e9/L 0.6 (L)   Absolute Monocytes Latest Ref Range: 0.0 - 1.3 10e9/L 0.9   Absolute Eosinophils Latest Ref Range: 0.0 - 0.7 10e9/L 0.0   Absolute Basophils Latest Ref Range: 0.0 - 0.2 10e9/L 0.0   Anisocytosis Unknown Slight   Microcytes Unknown Present   Platelet Estimate Unknown Decreased     Hospital Course   Artemio Nino is a 20 year old with a diagnosis of ALL, who recently underwent a hematopoetic stem cell transplant per protocol 2013-09 for treatment of his disease. Post-transplant complications have consisted of C.Diff, mucositis, nausea, and epistaxes.     BONE MARROW TRANSPLANT  # ALL Artemio carries a diagnosis of ALL, for which he underwent a  single umbilical cord blood  transplant per protocol 2011-09 with preperative regimen of Thiotepa, Fludarabine, Busulfan, and ATG. UCBT occurred with tolerated on 2/16/21. Neutrophil recovery achieved day + 20. Peripheral blood VNTRs reveal a chimerism of 100% donor in CD3+ and CD33+, and post-transplant bone marrow studies show TYRONE per flow and morphology; remaining studies pending. Disease and donor studies (with LPs) next due day +100, +180, 1 year, 2 years.     # Risk for GvHD: Artemio began MMF and Tacrolimus as GvHD prophylaxis  on day -3 per protocol. During admission, Artemio has not exhibited signs of GvHD. His tacrolimus level is pending upon discharge.    FEN/RENAL  # Risk for Fluid Imbalance: Given Artemio's risk for fluid imbalance, weights were monitored daily, along with diligent input and output measurements. He did not require consistent diuresis.    # Risk for Electrolyte Imbalance: Given Artemio's risk for electrolyte imbalance, electrolytes were monitored daily. Disturbances were noted, and replaced via TPN and sliding scale replacement.     # Risk for Renal Insufficiency: Artemio's pretransplant GFR was found to be 119 mls/min His kidney function was monitored closely during admission without concern.    # Risk for Malnutrition: Artemio was maintained on a regular diet by mouth at admission. He was at risk for malnutrition during admission, and enteral nutrition intake was monitored closely. As anticipated, his appetite declined during admission secondary to nausea/mucositis, and he was begun on TPN/IL. At the time of discharge, Artemio's nutrition regimen consists of TPN/IL.    # Risk for Atypical HUS/Transplant-Associated TMA: Artemio was at risk for aHUS/TA-TMA and underwent weekly LDH (most recently 177 on 3/15) and Urine Protein/Creatinine (0.46 on 3/9) monitoring which should continue weekly in the outpatient setting.    PULMONARY  # Risk for Respiratory Insufficiency: Artemio's respiratory status was monitored closely during admission, with no concerns.    CARDIOVASCULAR  # Risk for Hypertension Related to Medications: Artemio was at risk for hypertension secondary to medications, particularly Tacrolimus but his blood pressures were stable without need for anti-hypertensives.    # Asymptomatic WPW syndrome (diagnosed 2018): no interventions to date. Most recent EKG (1/27). ECHO (1/26) normal, EF 64%. 24h Zio patch holter (1/27-1/28) revealed c/w WPW with no ventricular ectopies present. Cardiology following.  Weekly BNPs and troponins were obtained per cardiology without concerning values or symptoms (not required in the outpatient setting). He will need a follow up ECHO in 6 mos (7/2021).     HEMATOLOGY  # Pancytopenia Secondary to Chemotherapy: Artemio experienced expected cytopenias secondary to chemotherapy. He was transfused for a hemoglobin < 7 (last on 3/10) and platelets <10,000 (last on 3/14). He required Tylenol and Benadryl premedications for PRBCs & platelets. GCSF was started on day +5 per protocol and due to continue until ANC 2.5 x3 (given day of discharge and due to give in clinic 3/16 for presumed final dose if ANC continues to uptrend).    INFECTIOUS DISEASE  # Risk for Opportunistic Infection: Artemio did experience a fever during admission and was started on empiric Cefepime, with blood cultures negative, yeast pending (3/8). He continues on Valtrex for viral prophylaxis due to CMV + serology pre-transplant. Weekly CMV PCR monitoring was performed during admission, most recently non-detectable on 3/10. Artemio was begun on micafungin for fungal prophylaxis which was transitioned later to Itraconazole on 3/11-- initial level due 3/22. He received Pentamidine on 2/15--  Bactrim due to start 3/22 if WBC criteria met. Artemio's IgG was most recently 617 (3/6)- continue checking q2 weeks and replace if <400 (no hx replacement at [facility]). Next IgG due 3/20.    # C.Diff (2/17): PO vanco (2/17-2/26), restarted due to increased frequency and volume of watery stool with abdominal cramping. 10 day course completed at discharge (3/5-3/15). Continue to monitor for recurrence.     Past infections: C. Diff (November 2020)    GASTROINTESTINAL  # Risk for GERD: Artemio was started on BID protonix and prn calcium carbonate for his reflux at admission.    # Risk for Nausea: Artemio did experience chemotherapy/transplant-related nausea. He was started on a zofran drip at the initiation of chemotherapy. He also  required scheduled emend to achieve adequate nausea control, and benefitted from benadryl PRNs. At the time of discharge, nausea is stable on a regimen of ativan twice daily..    # Risk for VOD/elastography study participant: Artemio was begun on ursodiol at admission for prophylaxis against veno-occlusive disease. Labwork and clinical status were monitored closely during admission, and at the time of discharge ursodiol was discontinued. He continues on the elastography study- follow imaging and labs per protocol.    NEUROLOGY  # Pain: Artemio experienced anticipated mucositis/pain related to chemotherapy and transplant. His analgesic regimen consisted of scheduled morphine that was converted to oxycodone, and at the time of discharge was transitioned to PRN dosing. He presented with left lateral shoulder/deltoid pain on 3/6 c/w MSK pain, decreased ROM, pain on palpation. Left deltoid pain is much improved at discharge, had been receiving physical therapy and using lidocaine patches.     # History of significant spinal headaches: consider using Candelario needle with lumbar punctures     # CRS history. 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 No CRS, neurotoxicity or infectious complications from [place] CAR-T.    Derm: Isael received nystatin ointment for a groin rash.     Fertility: Sperm collected previously collected for fertility preservation.    Disposition: Artemio will present to the [facility] tomorrow for labs, GCSF, possible platelets, and exam.     Pending Labwork: bone marrow biopsy (3/11): FISH, chromosomes, engraftment, NGS; blood culture yeast (3/8)  Upcoming Infusion Appointments: Tuesday 3/16/21 11 am infusion appointment for possible platelets and 11:30 am provider appointment   PT/OT/ST Outpatient Recommendations:  Primary BMT MD & RN Coordinator: MD Daria Carvalho, RN    The above plan of  care was developed by and communicated to me by the Pediatric BMT attending physician, Dr. Sid Shin, BENJA-AC  [facility] Blood and Marrow Transplant  [address] [address]  [phone]  [phone]    Patient Active Problem List   Diagnosis     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)     Aaron-Parkinson-White (WPW) syndrome     Bone marrow transplant candidate     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)     At risk for infection     S/P allogeneic bone marrow transplant (H)     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     Examination of participant or control in clinical research       BMT Attending Note:     Artemio was seen and evaluated by me today.      The significant interval history includes: Afebrile, vitals stable. Doing overall well. Plan to discharge today. Follow up with outpatient team tomorrow.     I have reviewed changes and data from the last 24 hours, including medications, laboratory results and vital signs.      I have formulated and discussed the plan with the BMT team. I discussed the course and plan with the patient/family and answered all of their questions to the best of my ability. I counseled them regarding the followin y/o M w/ h/o multiply relapsed Pre-B cell ALL (s/p MSD BMT, CART-19, CART-22) in CR3 here for consolidation of remission with MAC (FluBuTT ATG) conditioning, now S/P UCBT, engrafted, at risk for GVHD, at high risk for opportunistic infection, C. difficile colitis, at risk for malnutrition, nausea, resolving mucositis pain, at risk for VOD, at risk for gastritis, WPW, shoulder pain of unclear etiology-now resolved and tolerating ativan wean. Anticipatory guidance re: other potential transplant related complications and discharge teaching done by the team. Addressed the questions and concerns  to the best of my ability.     My care coordination activities today include oversight of planned lab studies, oversight of medication changes and discussion with BMT team-members.     My total floor time today was greater than 35 minutes, at least 50% of which was counseling and coordination of care.     Sid Girard MD    Pediatric Blood and Marrow Transplant   [facility]  Pager: [phone]

## 2021-02-10 NOTE — PLAN OF CARE
Chemotherapy    Type Given: Thiotepa  Blood return present: Yes , on both lumen of the Galvan.  Nausea/Vomiting Present: No, zofran gtt infusing   Additional Medications given: No. Patient tolerated infusion well. Thiotepa bath and dressing change done every 6 hours as scheduled  .    Patient has been afebrile. Heart rate in the 50's - 60's. Lung sounds clear bilaterally, SaO2 in the upper 90's on room air. Patient slept fairly. Continues on Zofran drip. Thiotepa bath and dressing change done at midnight and 0600. Voiding, no stool this shift. Hourly rounding completed. Continue to monitor and refer for any concerns.

## 2021-02-10 NOTE — PROGRESS NOTES
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 No complaints of nausea or pain. Denies any difficulty with heartburn or reflux discomfort.    Review of Systems: Pertinent positives include those mentioned in interval events. A complete review of systems was performed and is otherwise negative.      Medications:  Please see MAR    Physical Exam:  Temp:  [98.1  F (36.7  C)-98.6  F (37  C)] 98.6  F (37  C)  Pulse:  [52-70] 70  Resp:  [16-18] 18  BP: ()/(50-69) 88/53  SpO2:  [97 %-99 %] 97 %  I/O last 3 completed shifts:  In: 1990.9 [P.O.:945; I.V.:463.6; IV Piggyback:582.3]  Out: 1805 [Urine:1805]    GEN: Interactive, pleasant and cooperative, lying in bed.   HEENT: Head NC/AT, PERRL, EOMs intact, sclerae clear, nares patent, dentition intact, MMM  NECK: Supple.   CARD: RRR, normal S1/S2 without murmur  RESP: Lungs clear to auscultation bilaterally. Normal work of breathing. 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) and PLAT-05 ([place] CAR) at [place].  Currently receiving preparative chemotherapy per [id number] followed by UCBT.      BMT:  #  ALL and BMT: High risk B cell ALL (CNS negative) + JAK2 activation.   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  He is in deep remission and is MRD negative.  Now with loss of CD22 CAR and rejection of CD19 directed CAR. No evidence of leukemia on day +21 procedures.  Received CAR-T infusion 7/27 after flu/cy conditioning. Maximum CRS grade III requiring fluids, dopamine, Tociluzimab x3, and steroids. Now off steroids without evidence of CRS or neurotoxicity.   - As this is his second transplant, his risk of organ toxicity is increased.   Prep regimen per [id number] with Thiotepa (days -7 trough -6), Fludarabine (days -5 through -3), Busulfan (days -5 through -3), ATG (days -4 through -2), Rest day (day -1), UCBT (2/16).       #  Risk for GVHD: Prophylaxis with Tacrolimus and MMF beginning day -3.   - Tacrolimus through day +100 then taper if no evidence of GVHD. Close monitoring of drug levels.  - MMF through day +30, or 7 days after engraftment, whichever is later.        FEN/Renal:  # Risk for malnutrition: Currently no concerns.  Regular diet  - Previously required TPN/SMOF lipids  - Monitor nutritional intake  -CR at admission 0.65, 2/9 Cr 0.84, continue to monitor need for additional fluids, encourage drinking     # Risk for electrolyte abnormalities:    - Check daily electrolytes following admission     # Risk for renal dysfunction and fluid overload:  - GFR (1/28):  Normalized GFR equals 119.11 ml/min.   - Monitor I/O's and daily weights     # Risk for aHUS/TA-TMA: Close monitoring following admission.  - Monitor LDH qMonday  - Monitor urine protein/creatinine qTuesday     Pulmonary:  # Risk for pulmonary insufficiency: No current concerns  - Chest and sinus CTs unremarkable    Cardiovascular:  - Hx of asymptomatic WPW syndrome 2018 and hx of bradycardia during CRS. Artemio  does not require SBE prophylaxis for dental or contaminated procedures.  Artemio may be allowed activity ad jose de jesus to his own limits.  Follow-up with an Echo in 6  months.   -Cardiology recommends following NT-proBNP and troponin during the transplant periodically. Baseline completed 2/9   -2/9 baseline lipid panel completed   -1/27-1/28 completed 24 hour Zio patch Holter monitor. Cardiology called on 2/10, reading still pending, reuslts are typically not available for 2 weeks after test completion.   -Cardiology rec repeat ECHO in 6 months      # Echocardiogram (1/26): Normal echocardiogram with EF 64 %.  Catheter is seen with its tip in the right atrium.      # EKG (1/27): EKG revealed normal sinus rhythm with WPW at a rate of 51 beats/minute.    OR interval was short at 98 msec with pre-excitation preent; QTc interval was normal at 405 msec.  QRS axis was normal at +87 degrees.  There were no ST-T wave changes present.      # Risk for hypertension secondary to medications: Closely monitor following admission.     # Asymptomatic Aaron-Parkinson-White Syndrome. Diagnosed upon syncope in 02/2018. Asymptomatic, no interventions to date  Bradycardia noted during dopamine infusion while inpatient with CRS.      Heme:   # Pancytopenia secondary to chemotherapy  - Transfuse for hemoglobin < 7 , platelets < 10,000  - History of blood product transfusion reactions (hives) to both RBCs and platelets.   - Premedications with tylenol and benadryl are necessary. Assess need for adding hydrocortisone premed as well.  - GCSF 5 mcg/kg IV daily to begin day +5 per protocol and continue until ANC >/= 2500 x 3 consecutive days.     # Epistaxis: Hx of epistaxis during his last admission.      Infectious Disease:  # Risk for infection given immunocompromised status  Active: None  Prophylaxis: CMV and HSV IgG antibody positive.                                              -- Viral prophylaxis: Acyclovir will continue until Day -4  High Dose Acyclovir on Day -3  -- Fungal: Micafungin on admission. Transition to either itraconazole or posaconazole when able.  -- Bacterial: Levofloxacin Day -1  --  PCP prophylaxis: Pentamidine last administered 1/19/21 in [place].   - 2/16 Will receive dose of Pentam IV -ordered   Bactrim at day +28 if criteria are met. No history of adverse reaction      # Imaging  Chest CT (1/26):  No acute findings within the chest. No finding suspicious  for malignancy.  Sinus CT (1/26): Unremarkable. No evidence of infection.     Past infections:   C. Diff November 2020     GI:   # Nausea management:   -Zofran gtt with initation of chemotherapy   -Ativan prn      # Reflux: Isael has a history of severe reflux with chemotherapy.   -BID protonix   -PRN Tums      # Risk for VOD  - Ursodiol TID      Neuro:  # CRS history. 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  - No CRS, neurotoxicity or infectious complications from [place] CAR-T.     # Mucositis: Morphine drip as indicated. Isael also requested magic mouthwash and tylenol for pain.     # Seizure prophylaxis: Keppra during busulfan.     # History of significant spinal headaches     Fertility:  Sperm collected previously collected for fertility preservation     Discharge Considerations: Expected lengths of hospitalization for patients undergoing stem cell transplantation vary by primary diagnosis, conditioning regimen, graft source, and development of complications. A typical stay is 6 weeks.  The above plan of care was developed by and communicated to me by the   Pediatric BMT attending physician, Dr. Jojo Haider.     BENJA Deleon  Pediatric Blood and Marrow Transplant Program  [facility]                Patient Active Problem List   Diagnosis     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)     Aaron-Parkinson-White (WPW) syndrome     Bone marrow transplant candidate     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)     At risk for infection     S/P allogeneic bone marrow transplant (H)      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     Examination of participant or control in clinical research          The above plan of care was developed by and communicated to me by the   Pediatric BMT attending physician, Jojo Haider      Patient Active Problem List   Diagnosis     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)     Aaron-Parkinson-White (WPW) syndrome     Bone marrow transplant candidate     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)     At risk for infection     S/P allogeneic bone marrow transplant (H)     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     Examination of participant or control in clinical research     Pediatric BMT Attending Inpatient Note:  Artemio was seen and evaluated by me today.      The significant interval history includes tolerating thiotepa (and baths well). Counting down time to finish extra bathing. Mild tenderness at CVC site nearly resolved. Remains afebrile and otherwise stable without complaints/concerns.     I have reviewed changes and data from the last 24 hours, including medications, laboratory results, vital signs and pathology results.      I have formulated and discussed the plan with the BMT team. The relevant clinical topics addressed included the followin21 y/o M w/ h/o multiply relapsed Pre-B cell ALL (s/p MSD BMT, CART-19, CART-22) in CR3 admitted for consolidation of remission with MAC (FluBuTT ATG) followed by an UCBT, risk for malnutrition, risk for nausea, risk for VOD, risk for gastritis, risk for opportunistic infection on micafungin, valtrex and pentamidine, risk for seizures increased on Busulfan, WPW, monitoring pro-BNP and troponins, recent Ziopatch read pending.     I discussed the course and plan with the patient/family and answered all of their questions to the best of my ability.     My care coordination activities today include oversight of  planned lab studies, oversight of medication changes and discussion with BMT team-members.      My total floor time today was at least 450minutes, greater than 50% of which was counseling and coordination of care.     Jojo Haider MD MPH  , Pediatric Blood and Marrow Transplantation  [facility] [phone]

## 2021-02-10 NOTE — PLAN OF CARE
Afebrile. VSS, HR's 50-60s. Denies pain, no nausea vomiting. Continues on Zofran drip. Lungs clear on RA. Ambulating in hallway and doing leg exercises in room. Good PO intake, good appetite. No stool this shift. Adequate UOP. 1800 thiotepa shower/linen change/dressing change complete with no issues. Both parents visiting this shift. Hourly rounding complete. Will continue to monitor and notify MD of any changes.

## 2021-02-10 NOTE — PLAN OF CARE
BPs soft today but WNL. AOVSS. Lungs clear. Pt. Denies nausea and pain. Mom at bedside this am. Thyotepa bath completed at 1200, will be completed at midnight tonight. Zofran gtt continues. Rounding completed.

## 2021-02-10 NOTE — PLAN OF CARE
[id number]: Afebrile. Vitals stable. Denies pain. Thiotepa bath and dressing change completed at 1800. Mother attentive at bedside and updated on POC. All questions answered. Hourly rounding completed. Continue to closely monitor.

## 2021-02-11 LAB
ANION GAP SERPL CALCULATED.3IONS-SCNC: 8 MMOL/L (ref 3–14)
AT III ACT/NOR PPP CHRO: 95 % (ref 85–135)
BASOPHILS # BLD AUTO: 0 10E9/L (ref 0–0.2)
BASOPHILS NFR BLD AUTO: 0.2 %
BUN SERPL-MCNC: 9 MG/DL (ref 7–30)
BUSULFAN SERPL-MCNC: 1367 NG/ML
BUSULFAN SERPL-MCNC: 2262 NG/ML
BUSULFAN SERPL-MCNC: 3088 NG/ML
BUSULFAN SERPL-MCNC: 3644 NG/ML
BUSULFAN SERPL-MCNC: 3710 NG/ML
BUSULFAN SERPL-MCNC: 974 NG/ML
CALCIUM SERPL-MCNC: 9 MG/DL (ref 8.5–10.1)
CHLORIDE SERPL-SCNC: 106 MMOL/L (ref 94–109)
CO2 SERPL-SCNC: 24 MMOL/L (ref 20–32)
CREAT SERPL-MCNC: 0.8 MG/DL (ref 0.66–1.25)
DIFFERENTIAL METHOD BLD: ABNORMAL
EOSINOPHIL # BLD AUTO: 0.1 10E9/L (ref 0–0.7)
EOSINOPHIL NFR BLD AUTO: 1 %
ERYTHROCYTE [DISTWIDTH] IN BLOOD BY AUTOMATED COUNT: 13.1 % (ref 10–15)
GFR SERPL CREATININE-BSD FRML MDRD: >90 ML/MIN/{1.73_M2}
GLUCOSE SERPL-MCNC: 112 MG/DL (ref 70–99)
HCT VFR BLD AUTO: 41.4 % (ref 40–53)
HGB BLD-MCNC: 14.4 G/DL (ref 13.3–17.7)
IMM GRANULOCYTES # BLD: 0 10E9/L (ref 0–0.4)
IMM GRANULOCYTES NFR BLD: 0.3 %
LDH SERPL L TO P-CCNC: 205 U/L (ref 85–227)
LYMPHOCYTES # BLD AUTO: 0.1 10E9/L (ref 0.8–5.3)
LYMPHOCYTES NFR BLD AUTO: 2.2 %
MCH RBC QN AUTO: 32.1 PG (ref 26.5–33)
MCHC RBC AUTO-ENTMCNC: 34.8 G/DL (ref 31.5–36.5)
MCV RBC AUTO: 92 FL (ref 78–100)
MONOCYTES # BLD AUTO: 0.6 10E9/L (ref 0–1.3)
MONOCYTES NFR BLD AUTO: 9.8 %
NEUTROPHILS # BLD AUTO: 5.1 10E9/L (ref 1.6–8.3)
NEUTROPHILS NFR BLD AUTO: 86.5 %
NRBC # BLD AUTO: 0 10*3/UL
NRBC BLD AUTO-RTO: 0 /100
PLATELET # BLD AUTO: 154 10E9/L (ref 150–450)
POTASSIUM SERPL-SCNC: 3.5 MMOL/L (ref 3.4–5.3)
PROT C ACT/NOR PPP CHRO: 108 % (ref 70–170)
RBC # BLD AUTO: 4.49 10E12/L (ref 4.4–5.9)
RESEARCH KIT COLLECTION: NORMAL
RESEARCH KIT COLLECTION: NORMAL
SODIUM SERPL-SCNC: 138 MMOL/L (ref 133–144)
VWF:AC ACT/NOR PPP IA: 375 % (ref 50–180)
WBC # BLD AUTO: 5.9 10E9/L (ref 4–11)

## 2021-02-11 PROCEDURE — 80299 QUANTITATIVE ASSAY DRUG: CPT | Performed by: NURSE PRACTITIONER

## 2021-02-11 PROCEDURE — 258N000003 HC RX IP 258 OP 636: Performed by: NURSE PRACTITIONER

## 2021-02-11 PROCEDURE — 85025 COMPLETE CBC W/AUTO DIFF WBC: CPT | Performed by: PEDIATRICS

## 2021-02-11 PROCEDURE — 258N000001 HC RX 258: Performed by: NURSE PRACTITIONER

## 2021-02-11 PROCEDURE — 83615 LACTATE (LD) (LDH) ENZYME: CPT | Performed by: PEDIATRICS

## 2021-02-11 PROCEDURE — 258N000003 HC RX IP 258 OP 636: Performed by: PEDIATRICS

## 2021-02-11 PROCEDURE — 206N000001 HC R&B BMT UMMC

## 2021-02-11 PROCEDURE — 80048 BASIC METABOLIC PNL TOTAL CA: CPT | Performed by: PEDIATRICS

## 2021-02-11 PROCEDURE — 250N000011 HC RX IP 250 OP 636: Performed by: PEDIATRICS

## 2021-02-11 PROCEDURE — 250N000013 HC RX MED GY IP 250 OP 250 PS 637: Performed by: PEDIATRICS

## 2021-02-11 PROCEDURE — 250N000013 HC RX MED GY IP 250 OP 250 PS 637: Performed by: NURSE PRACTITIONER

## 2021-02-11 PROCEDURE — 999N001121 HC STATISTIC RESEARCH KIT COLLECTION: Performed by: PEDIATRICS

## 2021-02-11 PROCEDURE — 99233 SBSQ HOSP IP/OBS HIGH 50: CPT | Performed by: PEDIATRICS

## 2021-02-11 PROCEDURE — 250N000011 HC RX IP 250 OP 636: Performed by: NURSE PRACTITIONER

## 2021-02-11 PROCEDURE — 99001 SPECIMEN HANDLING PT-LAB: CPT | Performed by: PEDIATRICS

## 2021-02-11 RX ADMIN — URSODIOL 600 MG: 300 CAPSULE ORAL at 14:07

## 2021-02-11 RX ADMIN — URSODIOL 600 MG: 300 CAPSULE ORAL at 19:34

## 2021-02-11 RX ADMIN — VALACYCLOVIR HYDROCHLORIDE 500 MG: 500 TABLET, FILM COATED ORAL at 19:35

## 2021-02-11 RX ADMIN — MICAFUNGIN SODIUM 150 MG: 50 INJECTION, POWDER, LYOPHILIZED, FOR SOLUTION INTRAVENOUS at 13:01

## 2021-02-11 RX ADMIN — PANTOPRAZOLE SODIUM 40 MG: 40 TABLET, DELAYED RELEASE ORAL at 19:35

## 2021-02-11 RX ADMIN — URSODIOL 600 MG: 300 CAPSULE ORAL at 08:38

## 2021-02-11 RX ADMIN — DEXTROSE AND SODIUM CHLORIDE: 5; 450 INJECTION, SOLUTION INTRAVENOUS at 16:18

## 2021-02-11 RX ADMIN — DIPHENHYDRAMINE HYDROCHLORIDE 50 MG: 50 INJECTION, SOLUTION INTRAMUSCULAR; INTRAVENOUS at 23:48

## 2021-02-11 RX ADMIN — VALACYCLOVIR HYDROCHLORIDE 500 MG: 500 TABLET, FILM COATED ORAL at 08:38

## 2021-02-11 RX ADMIN — BUSULFAN 206.4 MG: 6 INJECTION INTRAVENOUS at 01:09

## 2021-02-11 RX ADMIN — LEVETIRACETAM 500 MG: 500 TABLET, FILM COATED ORAL at 19:35

## 2021-02-11 RX ADMIN — FLUDARABINE PHOSPHATE 88 MG: 25 INJECTION, SOLUTION INTRAVENOUS at 00:05

## 2021-02-11 RX ADMIN — LEVETIRACETAM 500 MG: 500 TABLET, FILM COATED ORAL at 08:38

## 2021-02-11 RX ADMIN — PANTOPRAZOLE SODIUM 40 MG: 40 TABLET, DELAYED RELEASE ORAL at 08:38

## 2021-02-11 ASSESSMENT — MIFFLIN-ST. JEOR: SCORE: 1605.37

## 2021-02-11 NOTE — PLAN OF CARE
Afebrile. LSC on RA. VSS. Voiding, no stool. No n/v. Denies pain. Pt aware he cannot go for hallway walks anymore. Busulfan levels complete. Mom and dad at bedside. Hourly rounding complete. Continue with POC.

## 2021-02-11 NOTE — PHARMACY-CONSULT NOTE
Busulfan - Area Under the Curve   AUC#1   Pharmacokinetics Note     Busulfan is a chemotherapeutic agent used for conditioning regimens in HSCT patients.    Therapeutic drug monitoring (TDM) using area under the plasma concentration curve (AUC) analysis is recommended due to high inter-individual variability in plasma levels.     A high busulfan AUC is associated with an increased risk for sinusoidal obstruction syndrome, and a suboptimal AUC is associated with an increased risk for graft rejection or disease relapse. TDM uses busulfan levels to optimize the targeted drug exposure and minimize drug-related toxicity.      The Goal Cumulative AUC (cAUC) for all 3 doses for this protocol is 55-65 mg hr/L (equal to 49926-46761  M min/L).    Predicted cAUC outside of this range require a dose adjustment.    Per protocol 2013-09, AUC calculations will be performed on Days -5 & -4/Doses 1 & 2.      Initial Dose = 206.4 mg IV q24h according to protocol(straight 3.2 mg/kg dosing - dosed on ABW)    Current Dose = 206.4 mg IV q24h     Date levels were drawn: 2/11/21 Dose number: 1   Model used for TDM: Christina  Based on calculated AUC1 and current dose, projected cAUC = 80.7 mg hr/L (equal to 15858  M min/L).    T1/2 = 3.09 hr   Clearance = 0.129 L/hr/kg     ASSESSMENT: The predicted busulfan cAUC is outside the goal range.     A new dose of 127.9 mg IV q24h will result in a predicted cAUC within goal range.  Round to 126 mg for measurability, this is a 39% dose reduction - predicted to result in a cumulative AUC of 59.8 mg hr/L    PLAN: Discussed result with BMT attending physician Viky Zavala.     Recommend to decrease current busulfan dose to 126 mg IV Q24H.    This recommendation is based on an ideal AUC cumulative goal of 60 mg hr/L (equal to 16918  M min/L).       Thank you,   Margaret Kelly, PharmD   Erica May, AndreD

## 2021-02-11 NOTE — PROGRESS NOTES
Peds BMT Integrative Therapy Progress Note      Artemio Nino is a 20 year old male history of Acute lymphoblastic leukemia (ALL). Isael is receiving BMT #2.     BMT Transplant Date: BMT #2 ; Day -5 (2/16/21)    Isael has utilized our services throughout previous transplants. He also has experience with integrative therapies from [facility].     Assessment    Pain Location: neck and shoulder tension, specifically at top of cervical spine and base of skull.     Isael informs me that this pain is typical for him and he is not too concerned but it does bother him.     Post Session Observation: Calm/Relaxed    Intervention    Integrative Therapy(ies) Provided: Massage and Topical Essential Oil Application: Ache Ease Blend 2% concentration in coconut carrier oil    Plan for Follow up    I plan to follow Isael throughout his transplant process. My next visit will be 2/12.     Claudia Floyd DNP (Mo), RN  Integrative Nurse Clinician  Pediatric Blood and Marrow Transplant  Integrative Therapy Program  Pager #: [phone]      Time Spent:45 minutes

## 2021-02-11 NOTE — PROGRESS NOTES
Pediatric BMT Daily Progress Note    Interval Events: Isael had a large emesis x1 last evening for which he utilized PRN benadryl. His fluid intake has decreased a bit but he is hungry today and otherwise feeling well. Fludarabine and Busulfan infused early this morning without issue.    Review of Systems: Pertinent positives include those mentioned in interval events. A complete review of systems was performed and is otherwise negative.      Medications:  Please see MAR    Physical Exam:    Temp:  [97.9  F (36.6  C)-99.2  F (37.3  C)] 98.8  F (37.1  C)  Pulse:  [58-97] 81  Resp:  [16-18] 18  BP: ()/(54-75) 98/61  SpO2:  [96 %-99 %] 98 %  I/O last 3 completed shifts:  In: 1472.04 [P.O.:400; I.V.:546.02; IV Piggyback:526.02]  Out: 1259 [Urine:775; Emesis/NG output:484]    GEN: Interactive, pleasant and cooperative, lying in bed. NAD.  HEENT: Head NC/AT, PERRL, EOMs intact, sclerae clear, nares patent, MMM  NECK: Supple.   CARD: RRR, normal S1/S2 without murmur  RESP: Lungs clear to auscultation bilaterally. Normal work of breathing. No adventitious lung sounds  ABD: Soft, NT, ND, no organomegaly, normal bowel sounds  EXTREM: WWP, MAEE  SKIN: Dry skin , Intact without erythema or rash on exposed skin surfaces  NEURO: No focal deficits  ACCESS: Double lumen burns, port (not accessed)    Labs: All reviewed with pertinent positives: Hgb 14.4, Plts 154k, WBC 5.9, ANC 5.1; BUN 9, Creat 0.8    Assessment and Plan: Artemio is a 21 yo male with history of multiply relapsed ALL undergoing conditioning/UCBT per 2013-09. Thiotepa completed, initiating Fludarabine/Busulfan today- has some intermittent nausea with decreased fluid intake otherwise well.    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 No  evidence of leukemia on day +21 procedures.  - Preparative regimen per [id number] with Thiotepa (days -7 trough -6), Fludarabine (days -5 through -3), Busulfan (days -5 through -3), ATG (days -4 through -2), Rest day (day -1), UCBT (2/16).   - Disease (BMBx) and donor evaluations due day +21     # Risk for GVHD: Tacrolimus and MMF to begin day -3    FEN/Renal:  # Risk for malnutrition:   - Regular diet as tolerated  - Dietician to follow    # Risk for electrolyte abnormalities:    - Check daily electrolytes following admission     # Risk for renal dysfunction and fluid overload: GFR (1/28): 119 mls/min  - Monitor I/O's and daily weights  - Start maintenance IV fluids due to decreased fluid intake/UOP     # Risk for aHUS/TA-TMA:  .  - Monitor LDH qMon/Thurs: 205 (2/11)  - Monitor urine protein/creatinine qTuesday     Pulmonary:  # Risk for pulmonary insufficiency: No current concerns    Cardiovascular:  # Asymptomatic WPW syndrome (diagnosed 2018): no interventions to date. Most recent EKG (1/27). ECHO (1/26) normal, EF 64%. Cardiology following.   - Obtain weekly BNP (219 on 2/9) and troponin (<0.015 on 2/9)  - Follow up ECHO in 6 mos (7/2021)  - Follow up 24h Zio patch holter (1/27-1/28)-- cardiology call 2/10- reading still pending, reuslts are typically not available for 2 weeks after test completion.      # Risk for hypertension secondary to medications: Closely monitor following admission.     Heme:   # Pancytopenia secondary to chemotherapy  - Transfuse for hemoglobin < 7 , platelets < 10,000  - Hx of transfusion reactions (hives): Premedicate with tylenol and benadryl prior to pRBCs and plts  - GCSF 5 mcg/kg IV daily to begin day +5 per protocol and continue until ANC >/= 2500 x 3 consecutive days.     Infectious Disease:  # Risk for infection given immunocompromised status with need for prophylaxis:  - Viral (CMV/HSV sero positive): Valtrex to transition to HD Acyclovir day -3  - Fungal: Micafungin.  Transition to either itraconazole or posaconazole when able  - Bacterial: Levaquin to start day -1  - PCP: Pentamidine given 1/19, next ordered for 2/16. Consider Bactrim next month if WBC criteria met (no hx of adverse reaction).     Past infections: C. Diff (November 2020)     GI:   # Nausea management:   - Zofran gtt    - Ativan prn -- consider scheduling when indicated, Benadryl prn     # Reflux:  - BID protonix   - PRN Tums      # Risk for VOD  - Ursodiol TID      Neuro:  # Mucositis:   - Tylenol PRN  - Morphine gtt when indicated      # Seizure prophylaxis: Keppra during busulfan     # History of significant spinal headaches: consider using Candelario needle with lumbar punctures    # CRS history. 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 No CRS, neurotoxicity or infectious complications from [place] CAR-T.     Fertility:  Sperm collected previously collected for fertility preservation     Discharge Considerations: Expected lengths of hospitalization for patients undergoing stem cell transplantation vary by primary diagnosis, conditioning regimen, graft source, and development of complications. A typical stay is 6 weeks.    The above plan of care was developed by and communicated to me by the   Pediatric BMT attending physician, Viky Zavala MD.     BENJA Rose-AC  [facility] Blood and Marrow Transplant  [address] [address]  [phone]  [phone]    Pediatric BMT Attending Inpatient Note:  Artemio was seen and evaluated by me today.      The significant interval history includes  Done with the thiotepa baths.  Vomited with fludarabine and busulfan. Had a dose of benadryl.   Was in good spirits this am.  Remains afebrile and otherwise stable without complaints/concerns.     I have reviewed changes and data from the last  24 hours, including medications, laboratory results, vital signs and pathology results.      I have formulated and discussed the plan with the BMT team. The relevant clinical topics addressed included the followin21 y/o M w/ h/o multiply relapsed Pre-B cell ALL (s/p MSD BMT, CART-19, CART-22) in CR3 admitted for consolidation of remission with MAC (FluBuTT ATG) followed by an UCBT, risk for malnutrition, risk for nausea, risk for VOD, risk for gastritis, risk for opportunistic infection on micafungin, valtrex and pentamidine, risk for seizures increased on Busulfan, WPW, monitoring pro-BNP and troponins, recent Ziopatch read pending.  FLu and Bu today.      I discussed the course and plan with the patient/family and answered all of their questions to the best of my ability.     My care coordination activities today include oversight of planned lab studies, oversight of medication changes and discussion with BMT team-members.      My total floor time today was at least 40 minutes, greater than 50% of which was counseling and coordination of care.    Viky Zavala MD, PhD    Pediatric Blood and Marrow Transplant  [facility]            Patient Active Problem List   Diagnosis     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)     Aaron-Parkinson-White (WPW) syndrome     Bone marrow transplant candidate     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)     At risk for infection     S/P allogeneic bone marrow transplant (H)     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     Examination of participant or control in clinical research

## 2021-02-11 NOTE — PLAN OF CARE
AF. VSS. Lungs clear on Ra. No c/o of pain. Final thiotepa bath given. Benadryl given pre chemo. 1 large emesis. Fludarabine and busulfan given. Good UOP. No stool. Hourly rounding done.

## 2021-02-12 LAB
ANION GAP SERPL CALCULATED.3IONS-SCNC: 6 MMOL/L (ref 3–14)
BUN SERPL-MCNC: 6 MG/DL (ref 7–30)
BUSULFAN SERPL-MCNC: 1340 NG/ML
BUSULFAN SERPL-MCNC: 2007 NG/ML
BUSULFAN SERPL-MCNC: 2337 NG/ML
BUSULFAN SERPL-MCNC: 2344 NG/ML
BUSULFAN SERPL-MCNC: 36 NG/ML
BUSULFAN SERPL-MCNC: 572 NG/ML
BUSULFAN SERPL-MCNC: 876 NG/ML
CALCIUM SERPL-MCNC: 8.1 MG/DL (ref 8.5–10.1)
CHLORIDE SERPL-SCNC: 108 MMOL/L (ref 94–109)
CO2 SERPL-SCNC: 23 MMOL/L (ref 20–32)
COPATH REPORT: NORMAL
CREAT SERPL-MCNC: 0.7 MG/DL (ref 0.66–1.25)
GFR SERPL CREATININE-BSD FRML MDRD: >90 ML/MIN/{1.73_M2}
GLUCOSE SERPL-MCNC: 120 MG/DL (ref 70–99)
HGB BLD-MCNC: 12.8 G/DL (ref 13.3–17.7)
Lab: NORMAL
MAGNESIUM SERPL-MCNC: 1.9 MG/DL (ref 1.6–2.3)
PHOSPHATE SERPL-MCNC: 3.1 MG/DL (ref 2.5–4.5)
PLATELET # BLD AUTO: 109 10E9/L (ref 150–450)
POTASSIUM SERPL-SCNC: 3.8 MMOL/L (ref 3.4–5.3)
SODIUM SERPL-SCNC: 137 MMOL/L (ref 133–144)
SPECIMEN SOURCE: NORMAL
YEAST SPEC QL CULT: NORMAL

## 2021-02-12 PROCEDURE — 83735 ASSAY OF MAGNESIUM: CPT | Performed by: PEDIATRICS

## 2021-02-12 PROCEDURE — 80299 QUANTITATIVE ASSAY DRUG: CPT | Performed by: NURSE PRACTITIONER

## 2021-02-12 PROCEDURE — 250N000013 HC RX MED GY IP 250 OP 250 PS 637: Performed by: PEDIATRICS

## 2021-02-12 PROCEDURE — 99233 SBSQ HOSP IP/OBS HIGH 50: CPT | Performed by: PEDIATRICS

## 2021-02-12 PROCEDURE — 250N000011 HC RX IP 250 OP 636: Performed by: PEDIATRICS

## 2021-02-12 PROCEDURE — 84100 ASSAY OF PHOSPHORUS: CPT | Performed by: PEDIATRICS

## 2021-02-12 PROCEDURE — 250N000013 HC RX MED GY IP 250 OP 250 PS 637: Performed by: NURSE PRACTITIONER

## 2021-02-12 PROCEDURE — 258N000001 HC RX 258: Performed by: NURSE PRACTITIONER

## 2021-02-12 PROCEDURE — 206N000001 HC R&B BMT UMMC

## 2021-02-12 PROCEDURE — 258N000003 HC RX IP 258 OP 636: Performed by: PEDIATRICS

## 2021-02-12 PROCEDURE — 85049 AUTOMATED PLATELET COUNT: CPT | Performed by: PEDIATRICS

## 2021-02-12 PROCEDURE — 250N000011 HC RX IP 250 OP 636: Performed by: NURSE PRACTITIONER

## 2021-02-12 PROCEDURE — 85018 HEMOGLOBIN: CPT | Performed by: PEDIATRICS

## 2021-02-12 PROCEDURE — 258N000003 HC RX IP 258 OP 636: Performed by: NURSE PRACTITIONER

## 2021-02-12 PROCEDURE — 80048 BASIC METABOLIC PNL TOTAL CA: CPT | Performed by: PEDIATRICS

## 2021-02-12 RX ORDER — LORAZEPAM 2 MG/ML
1 INJECTION INTRAMUSCULAR EVERY 6 HOURS
Status: DISCONTINUED | OUTPATIENT
Start: 2021-02-12 | End: 2021-03-08

## 2021-02-12 RX ADMIN — PANTOPRAZOLE SODIUM 40 MG: 40 TABLET, DELAYED RELEASE ORAL at 08:28

## 2021-02-12 RX ADMIN — PANTOPRAZOLE SODIUM 40 MG: 40 TABLET, DELAYED RELEASE ORAL at 19:37

## 2021-02-12 RX ADMIN — URSODIOL 600 MG: 300 CAPSULE ORAL at 08:28

## 2021-02-12 RX ADMIN — DEXTROSE AND SODIUM CHLORIDE: 5; 450 INJECTION, SOLUTION INTRAVENOUS at 10:23

## 2021-02-12 RX ADMIN — ACYCLOVIR SODIUM 600 MG: 50 INJECTION, SOLUTION INTRAVENOUS at 23:39

## 2021-02-12 RX ADMIN — URSODIOL 600 MG: 300 CAPSULE ORAL at 19:37

## 2021-02-12 RX ADMIN — ONDANSETRON 1 MG/HR: 2 INJECTION INTRAMUSCULAR; INTRAVENOUS at 10:46

## 2021-02-12 RX ADMIN — LEVETIRACETAM 500 MG: 500 TABLET, FILM COATED ORAL at 19:37

## 2021-02-12 RX ADMIN — DIPHENHYDRAMINE HYDROCHLORIDE 50 MG: 50 INJECTION, SOLUTION INTRAMUSCULAR; INTRAVENOUS at 12:35

## 2021-02-12 RX ADMIN — FLUDARABINE PHOSPHATE 88 MG: 25 INJECTION, SOLUTION INTRAVENOUS at 00:15

## 2021-02-12 RX ADMIN — MICAFUNGIN SODIUM 150 MG: 50 INJECTION, POWDER, LYOPHILIZED, FOR SOLUTION INTRAVENOUS at 11:25

## 2021-02-12 RX ADMIN — ACETAMINOPHEN 500 MG: 500 TABLET ORAL at 12:35

## 2021-02-12 RX ADMIN — URSODIOL 600 MG: 300 CAPSULE ORAL at 13:28

## 2021-02-12 RX ADMIN — ACYCLOVIR SODIUM 600 MG: 50 INJECTION, SOLUTION INTRAVENOUS at 00:01

## 2021-02-12 RX ADMIN — DEXTROSE AND SODIUM CHLORIDE: 5; 450 INJECTION, SOLUTION INTRAVENOUS at 10:22

## 2021-02-12 RX ADMIN — ACYCLOVIR SODIUM 600 MG: 50 INJECTION, SOLUTION INTRAVENOUS at 08:28

## 2021-02-12 RX ADMIN — LORAZEPAM 1 MG: 2 INJECTION INTRAMUSCULAR; INTRAVENOUS at 22:48

## 2021-02-12 RX ADMIN — LEVETIRACETAM 500 MG: 500 TABLET, FILM COATED ORAL at 08:28

## 2021-02-12 RX ADMIN — DIPHENHYDRAMINE HYDROCHLORIDE 50 MG: 50 INJECTION, SOLUTION INTRAMUSCULAR; INTRAVENOUS at 23:39

## 2021-02-12 RX ADMIN — BUSULFAN 126 MG: 6 INJECTION INTRAVENOUS at 01:19

## 2021-02-12 RX ADMIN — ACYCLOVIR SODIUM 600 MG: 50 INJECTION, SOLUTION INTRAVENOUS at 16:02

## 2021-02-12 RX ADMIN — ANTI-THYMOCYTE GLOBULIN (RABBIT) 125 MG: 5 INJECTION, POWDER, LYOPHILIZED, FOR SOLUTION INTRAVENOUS at 13:03

## 2021-02-12 RX ADMIN — LORAZEPAM 1 MG: 2 INJECTION INTRAMUSCULAR; INTRAVENOUS at 12:06

## 2021-02-12 RX ADMIN — LORAZEPAM 1 MG: 2 INJECTION INTRAMUSCULAR; INTRAVENOUS at 17:24

## 2021-02-12 RX ADMIN — METHYLPREDNISOLONE SODIUM SUCCINATE 60 MG: 40 INJECTION, POWDER, LYOPHILIZED, FOR SOLUTION INTRAMUSCULAR; INTRAVENOUS at 12:35

## 2021-02-12 ASSESSMENT — MIFFLIN-ST. JEOR: SCORE: 1603.37

## 2021-02-12 NOTE — PROGRESS NOTES
CLINICAL NUTRITION SERVICES - PEDIATRIC ASSESSMENT NOTE    REASON FOR ASSESSMENT  Artemio Nino is a 20 year old male seen by the dietitian for LOS/previous need for TPN during previous treatment.     ANTHROPOMETRICS  Height (2/8): 173.5 cm  Weight (2/11): 62.6 kg  BMI (2/8): 20.80 kg/m2  Dosing Weight: 64.5 kg - admission wt    Wt Readings from Last 10 Encounters:   02/11/21 62.6 kg (138 lb 0.1 oz)   01/27/21 63.5 kg (139 lb 15.9 oz)   01/26/21 63.5 kg (139 lb 15.9 oz)   09/25/20 60.8 kg (134 lb 0.6 oz)   09/25/20 61 kg (134 lb 7.7 oz)   08/28/20 59.8 kg (131 lb 13.4 oz)   08/26/20 59.3 kg (130 lb 11.7 oz)   08/24/20 59.2 kg (130 lb 8.2 oz)   08/21/20 61.1 kg (134 lb 11.2 oz)   08/20/20 61.8 kg (136 lb 3.9 oz)     Comments/Average Daily Wt Gain: Weight has fluctuated over the past 6 months but has overall remained stable. Weight trending down over the past week.     NUTRITION HISTORY  Patient is on a Regular diet at home.    Per conversation with patient over the phone, his intake was completely normal and he was eating well prior to admission. However, since admission his intake has continually decreased due to the nausea from chemo.     Per chart review, pt has been eating fairly well over the past couple of days. Per flowsheets, pt has been eating approximately % of one meal daily. Pt has eaten bites of his dinner, sherbet and pizza. Earlier in the week it was noted pt had good appetite and po intake but as the week has gone on pt has had increased nausea and emesis.     Information obtained from Chart  Factors affecting nutrition intake include: nausea, vomiting, medical course    CURRENT NUTRITION ORDERS  Orders Placed This Encounter      Regular Diet Adult    CURRENT NUTRITION SUPPORT   No current nutrition support. Per chart review, pt previously on TPN.    PHYSICAL FINDINGS  Obtained from Chart/Interdisciplinary Team  BMT #2 Day -4    LABS  Labs reviewed   -  elevated    MEDICATIONS  Medications reviewed    ASSESSED NUTRITION NEEDS:  Valentino Equation: BMR (1660) x 1.2 - 1.4 = 1745-6244 kcal   Estimated Energy Needs: 30-35 kcal/kg EN/PO; 25-30 kcal/kg PN; 25-35 kcal/kg PN+EN  Estimated Protein Needs: 1.5-2g/kg  Estimated Fluid Needs: 1 ml/kcal or per MD    Micronutrient Needs: per RDA    PEDIATRIC NUTRITION STATUS VALIDATION  Patient does not meet criteria for malnutrition.    NUTRITION DIAGNOSIS:  Predicted suboptimal nutrient intake related to anticipated decline in po as evidence by likelihood of symptoms from treatment course affecting po intake.     INTERVENTIONS  Nutrition Prescription  Po/nutrition support to meet needs for age appropriate growth    Nutrition Education:   Provided education on nutrition during BMT including nutrition support to patient. Patient familiar with nutrition during BMT process such as TPN from previous transplant. Patient did not have any questions or concerns at this time. In addition, pt did not feel like having supplements or snacks sent up from room service at this time. Allow pt to order snacks/supplements PRN.     Implementation:  Parenteral Nutrition -- see recommendations below if TPN becomes part of plan  Collaboration and Referral of Nutrition care -- discussed plan of care for pt during rounds     Goals  1. Po and/or nutrition support to meet greater than 75% of needs  2. Weight maintenance during hospital stay    FOLLOW UP/MONITORING  Food and Beverage intake- monitor po  Enteral and parenteral nutrition intake- follow for need  Anthropometric measurements- monitor wt    RECOMMENDATIONS  If PN becomes part of plan of care recommend PN of 1296mLs, Dextrose 236 g, GIR 2.5, 97 g Amino Acids, 1.5 g/kg Amino Acids, 240 mL lipids, 0.8 g/kg for 1670 kcals, (26 kcal/kg). PN will meet 100% of kcal needs and 100% of protein needs.    Tita Guillen, ROYAL, LD  Pager: [phone]

## 2021-02-12 NOTE — PLAN OF CARE
Afebrile. VSS. Lungs clear on RA. No pain. Emesis x 1. Benadryl x 1 pre-chemo. Good UOP. No stool. Zofran gtt continues. Hourly rounding done.

## 2021-02-12 NOTE — PROGRESS NOTES
Peds BMT Integrative Therapy Progress Note      Artemio Nino is a 20 year old male history of Acute lymphoblastic leukemia (ALL). Isael is receiving BMT #2.     BMT Transplant Date: BMT #2 ; Day -4 (2/16/21)    Isael has utilized our services throughout previous transplants. He also has experience with integrative therapies from [facility].     Assessment    Pain Location: neck and shoulder tension, specifically at top of cervical spine and base of skull.     Isael informs me that this pain is typical for him and he is not too concerned but it does bother him.     When massaging back he tensed when I provided pressure to thoracic spine. He noted he is tender to this area.    Post Session Observation: Calm/Relaxed    Intervention    Integrative Therapy(ies) Provided: Massage and Topical Essential Oil Application: Ache Ease Blend 2% concentration in coconut carrier oil    Plan for Follow up    I plan to follow Isael throughout his transplant process.     I provided Isael an acupressure handout for nausea and I instructed him how to use the reference.   Encourage him to do 2-3 times a day even when not nauseous.   Claudia Floyd DNP (Mo), RN  Integrative Nurse Clinician  Pediatric Blood and Marrow Transplant  Integrative Therapy Program  Pager #: [phone]      Time Spent:45 minutes

## 2021-02-12 NOTE — PHARMACY-CONSULT NOTE
Busulfan - Area Under the Curve   AUC#2   Pharmacokinetics Note      Busulfan is a chemotherapeutic agent used for conditioning regimens in HSCT patients.    Therapeutic drug monitoring (TDM) using area under the plasma concentration curve (AUC) analysis is recommended due to high inter-individual variability in plasma levels.     A high busulfan AUC is associated with an increased risk for sinusoidal obstruction syndrome, and a suboptimal AUC is associated with an increased risk for graft rejection or disease relapse. TDM uses busulfan levels to optimize the targeted drug exposure and minimize drug-related toxicity.       The Goal Cumulative AUC (cAUC) for all 3 doses for this protocol is 55-65 mg hr/L (equal to 42290-30444  M min/L).    Predicted cAUC outside of this range require a dose adjustment.    Per protocol 2013-09, AUC calculations will be performed on Days -5 & -4/Doses 1 & 2.       Initial Dose = 206.4 mg IV q24h according to protocol(straight 3.2 mg/kg dosing - dosed on ABW)     Current Dose = 126 mg IV q24h      Date levels were drawn: 2/12/21              Dose number: 2   Model used for TDM: Christina  Based on calculated AUC2 and current dose, projected cAUC = 59.8 mg hr/L (equal to 46435  M min/L).    T1/2 = 3.06 hr   Clearance = 0.129 L/hr/kg      ASSESSMENT: The predicted busulfan cAUC is within the goal range.        PLAN: Discussed result with BMT attending physician Viky Zavala.     Recommend to continue current busulfan dose of 126 mg IV Q24H.    This recommendation is based on an ideal AUC cumulative goal of 60 mg hr/L (equal to 25644  M min/L).        Thank you,   Margaret Kelly, PharmD

## 2021-02-12 NOTE — PLAN OF CARE
Afebrile, vss, lungs clear.   Denies pain or nausea. States he gets nauseous with chemo infusion.  Drinking water. No other po intake noted.   Walks to bathroom , otherwise in bed playing video games.   Refusing mouth care.   First dose ATG started this afternoon.   Mother here this afternoon. Hourly rounding completed. Continues with plan of care

## 2021-02-12 NOTE — PLAN OF CARE
Afebrile. VSS. Denies pain, emesis x1 before dinner. No PRN's needed. Lungs clear on RA. Voiding, no stool. MIVF running at 100 ml/hr. Drinking well, not much eaten for dinner. Wipe down and linen change completed. Mother at bedside and attentive to pt. Will continue to monitor and notify MD of any changes.

## 2021-02-12 NOTE — PROGRESS NOTES
Pediatric BMT Daily Progress Note    Interval Events: Isael continues with intermittent emesis but afebrile and clinically stable. Day -4 today- continues on Fludarabine, Busulfan, and due for first dose of ATG.    Review of Systems: Pertinent positives include those mentioned in interval events. A complete review of systems was performed and is otherwise negative.      Medications:  Please see MAR    Physical Exam:    Temp:  [98.4  F (36.9  C)-99.2  F (37.3  C)] 99.2  F (37.3  C)  Pulse:  [76-88] 83  Resp:  [16-25] 18  BP: ()/(43-89) 91/43  SpO2:  [97 %-100 %] 97 %  I/O last 3 completed shifts:  In: 2645.5 [P.O.:300; I.V.:1980; IV Piggyback:365.5]  Out: 1205 [Urine:830; Emesis/NG output:375]    GEN: Interactive, pleasant and cooperative, lying in bed. NAD. Mother present.  HEENT: Head NC/AT, PERRL, EOMs intact, sclerae clear, nares patent, MMM  NECK: Supple.   CARD: RRR, normal S1/S2 without murmur  RESP: Lungs clear to auscultation bilaterally. Normal work of breathing. No adventitious lung sounds  ABD: Soft, NT, ND, no organomegaly, normal bowel sounds  EXTREM: WWP, MAEE  SKIN: Dry skin; Intact without erythema or rash on exposed skin surfaces  NEURO: No focal deficits  ACCESS: Double lumen burns, port (not accessed)    Labs: All reviewed with pertinent positives: Hgb 12.8, Plts 109k; BUN 6, Creat 0.7    Assessment and Plan: Artemio is a 21 yo male with history of multiply relapsed ALL undergoing conditioning/UCBT per 2013-09. Thiotepa completed, continues on Fludarabine/Busulfan and will receive ATG today- has some intermittent nausea with decreased fluid intake otherwise well.    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 No evidence of leukemia on day +21 procedures.  - Preparative  regimen per MT2013-09 with Thiotepa (days -7 trough -6), Fludarabine (days -5 through -3), Busulfan (days -5 through -3), ATG (days -4 through -2), Rest day (day -1), UCBT (2/16).   - Disease (BMBx) and donor evaluations due day +21     # Risk for GVHD: Tacrolimus and MMF to begin day -3    FEN/Renal:  # Risk for malnutrition:   - Regular diet as tolerated  - Dietician to follow    # Risk for electrolyte abnormalities:    - Check daily electrolytes following admission     # Risk for renal dysfunction and fluid overload: GFR (1/28): 119 mls/min  - Monitor I/O's and daily weights  - Maintenance IV fluids due to decreased fluid intake/UOP     # Risk for aHUS/TA-TMA:  .  - Monitor LDH qMon/Thurs: 205 (2/11)  - Monitor urine protein/creatinine qTuesday     Pulmonary:  # Risk for pulmonary insufficiency: No current concerns    Cardiovascular:  # Asymptomatic WPW syndrome (diagnosed 2018): no interventions to date. Most recent EKG (1/27). ECHO (1/26) normal, EF 64%. Cardiology following.   - Obtain weekly BNP (219 on 2/9) and troponin (<0.015 on 2/9)  - Follow up ECHO in 6 mos (7/2021)  - Follow up 24h Zio patch holter (1/27-1/28)-- cardiology call 2/10- reading still pending, reuslts are typically not available for 2 weeks after test completion.      # Risk for hypertension secondary to medications: Closely monitor following admission.     Heme:   # Pancytopenia secondary to chemotherapy  - Transfuse for hemoglobin < 7, platelets < 10,000  - Hx of transfusion reactions (hives): Premedicate with tylenol and benadryl prior to pRBCs and plts  - GCSF 5 mcg/kg IV daily to begin day +5 per protocol and continue until ANC >/= 2500 x 3 consecutive days.     Infectious Disease:  # Risk for infection given immunocompromised status with need for prophylaxis:  - Viral (CMV/HSV sero positive): HD Acyclovir. Weekly CMV neg 2/10.  - Fungal: Micafungin. Transition to either itraconazole or posaconazole when able  - Bacterial: Levaquin to  start day -1  - PCP: Pentamidine given 1/19, next ordered for 2/16. Consider Bactrim next month if WBC criteria met (no hx of adverse reaction).     Past infections: C. Diff (November 2020)     GI:   # Nausea management:   - Zofran gtt; schedule ativan q6h. Consider scopolamine patch over the weekend if not improved.  - Benadryl prn     # Reflux:  - BID protonix   - PRN Tums      # Risk for VOD  - Ursodiol TID      Neuro:  # Mucositis:   - Tylenol PRN  - Morphine gtt when indicated      # Seizure prophylaxis: Keppra during busulfan     # History of significant spinal headaches: consider using Candelario needle with lumbar punctures    # CRS history. 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 No CRS, neurotoxicity or infectious complications from [place] CAR-T.     Fertility: Sperm collected previously collected for fertility preservation     Discharge Considerations: Expected lengths of hospitalization for patients undergoing stem cell transplantation vary by primary diagnosis, conditioning regimen, graft source, and development of complications. A typical stay is 6 weeks.    The above plan of care was developed by and communicated to me by the   Pediatric BMT attending physician, Viky Zavala MD.     BENJA Rose-LORENZA  [facility] Blood and Marrow Transplant  [address] [address]  [phone]  [phone]    Pediatric BMT Attending Inpatient Note:  Artemio was seen and evaluated by me today.      The significant interval history includes continues to have emesis after chemotherapy.  Adjusted busulfan dose.  Had a slight headache this am.        I have reviewed changes and data from the last 24 hours, including medications, laboratory results, vital signs and pathology results.      I have formulated and discussed the plan with the  BMT team. The relevant clinical topics addressed included the followin21 y/o M w/ h/o multiply relapsed Pre-B cell ALL (s/p MSD BMT, CART-19, CART-22) in CR3 admitted for consolidation of remission with MAC (FluBuTT ATG) followed by an UCBT, risk for malnutrition, risk for nausea, risk for VOD, risk for gastritis, risk for opportunistic infection on micafungin, valtrex and pentamidine, risk for seizures increased on Busulfan, WPW, monitoring pro-BNP and troponins, recent Ziopatch read pending.  Adjusted busulfan yesterday.We gave anticipatory guidance for ATG.     I discussed the course and plan with the patient/family and answered all of their questions to the best of my ability.     My care coordination activities today include oversight of planned lab studies, oversight of medication changes and discussion with BMT team-members.      My total floor time today was at least 40 minutes, greater than 50% of which was counseling and coordination of care.    Viky Zavala MD, PhD    Pediatric Blood and Marrow Transplant  [facility]        Patient Active Problem List   Diagnosis     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)     Aaron-Parkinson-White (WPW) syndrome     Bone marrow transplant candidate     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)     At risk for infection     S/P allogeneic bone marrow transplant (H)     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     Examination of participant or control in clinical research

## 2021-02-13 LAB
ANION GAP SERPL CALCULATED.3IONS-SCNC: 7 MMOL/L (ref 3–14)
BUN SERPL-MCNC: 9 MG/DL (ref 7–30)
CALCIUM SERPL-MCNC: 8.2 MG/DL (ref 8.5–10.1)
CHLORIDE SERPL-SCNC: 105 MMOL/L (ref 94–109)
CO2 SERPL-SCNC: 24 MMOL/L (ref 20–32)
CREAT SERPL-MCNC: 0.75 MG/DL (ref 0.66–1.25)
GFR SERPL CREATININE-BSD FRML MDRD: >90 ML/MIN/{1.73_M2}
GLUCOSE SERPL-MCNC: 167 MG/DL (ref 70–99)
HGB BLD-MCNC: 12.9 G/DL (ref 13.3–17.7)
PLATELET # BLD AUTO: 94 10E9/L (ref 150–450)
POTASSIUM SERPL-SCNC: 3.3 MMOL/L (ref 3.4–5.3)
SODIUM SERPL-SCNC: 136 MMOL/L (ref 133–144)

## 2021-02-13 PROCEDURE — 258N000001 HC RX 258: Performed by: NURSE PRACTITIONER

## 2021-02-13 PROCEDURE — 85049 AUTOMATED PLATELET COUNT: CPT | Performed by: PEDIATRICS

## 2021-02-13 PROCEDURE — 250N000013 HC RX MED GY IP 250 OP 250 PS 637: Performed by: NURSE PRACTITIONER

## 2021-02-13 PROCEDURE — 250N000011 HC RX IP 250 OP 636: Performed by: PEDIATRICS

## 2021-02-13 PROCEDURE — 99233 SBSQ HOSP IP/OBS HIGH 50: CPT | Performed by: PEDIATRICS

## 2021-02-13 PROCEDURE — 250N000013 HC RX MED GY IP 250 OP 250 PS 637: Performed by: PEDIATRICS

## 2021-02-13 PROCEDURE — 250N000011 HC RX IP 250 OP 636: Performed by: NURSE PRACTITIONER

## 2021-02-13 PROCEDURE — 258N000003 HC RX IP 258 OP 636: Performed by: NURSE PRACTITIONER

## 2021-02-13 PROCEDURE — 85018 HEMOGLOBIN: CPT | Performed by: PEDIATRICS

## 2021-02-13 PROCEDURE — 258N000003 HC RX IP 258 OP 636: Performed by: PEDIATRICS

## 2021-02-13 PROCEDURE — 80048 BASIC METABOLIC PNL TOTAL CA: CPT | Performed by: PEDIATRICS

## 2021-02-13 PROCEDURE — 87103 BLOOD FUNGUS CULTURE: CPT | Performed by: PEDIATRICS

## 2021-02-13 PROCEDURE — 87040 BLOOD CULTURE FOR BACTERIA: CPT | Performed by: PEDIATRICS

## 2021-02-13 PROCEDURE — 250N000009 HC RX 250: Performed by: NURSE PRACTITIONER

## 2021-02-13 PROCEDURE — 206N000001 HC R&B BMT UMMC

## 2021-02-13 RX ORDER — NALOXONE HYDROCHLORIDE 0.4 MG/ML
0.4 INJECTION, SOLUTION INTRAMUSCULAR; INTRAVENOUS; SUBCUTANEOUS
Status: DISCONTINUED | OUTPATIENT
Start: 2021-02-13 | End: 2021-03-15 | Stop reason: HOSPADM

## 2021-02-13 RX ORDER — DIPHENHYDRAMINE HYDROCHLORIDE AND LIDOCAINE HYDROCHLORIDE AND ALUMINUM HYDROXIDE AND MAGNESIUM HYDRO
10 KIT EVERY 6 HOURS PRN
Status: DISCONTINUED | OUTPATIENT
Start: 2021-02-13 | End: 2021-03-12

## 2021-02-13 RX ORDER — MORPHINE SULFATE 2 MG/ML
2 INJECTION, SOLUTION INTRAMUSCULAR; INTRAVENOUS
Status: DISCONTINUED | OUTPATIENT
Start: 2021-02-13 | End: 2021-02-20

## 2021-02-13 RX ORDER — SALIVA SUBSTITUTE COMBO NO.2
30 SOLUTION, ORAL MUCOUS MEMBRANE EVERY 4 HOURS PRN
Status: DISCONTINUED | OUTPATIENT
Start: 2021-02-13 | End: 2021-03-12

## 2021-02-13 RX ADMIN — BUSULFAN 126 MG: 6 INJECTION INTRAVENOUS at 01:05

## 2021-02-13 RX ADMIN — CEFEPIME 2 G: 2 INJECTION, POWDER, FOR SOLUTION INTRAVENOUS at 05:02

## 2021-02-13 RX ADMIN — DEXTROSE AND SODIUM CHLORIDE: 5; 450 INJECTION, SOLUTION INTRAVENOUS at 03:59

## 2021-02-13 RX ADMIN — PANTOPRAZOLE SODIUM 40 MG: 40 TABLET, DELAYED RELEASE ORAL at 09:05

## 2021-02-13 RX ADMIN — ACYCLOVIR SODIUM 600 MG: 50 INJECTION, SOLUTION INTRAVENOUS at 08:07

## 2021-02-13 RX ADMIN — MYCOPHENOLATE MOFETIL 960 MG: 500 INJECTION, POWDER, LYOPHILIZED, FOR SOLUTION INTRAVENOUS at 13:11

## 2021-02-13 RX ADMIN — DEXTROSE MONOHYDRATE 0.03 MG/KG/DAY: 5 INJECTION, SOLUTION INTRAVENOUS at 21:16

## 2021-02-13 RX ADMIN — LORAZEPAM 1 MG: 2 INJECTION INTRAMUSCULAR; INTRAVENOUS at 10:09

## 2021-02-13 RX ADMIN — URSODIOL 600 MG: 300 CAPSULE ORAL at 09:05

## 2021-02-13 RX ADMIN — METHYLPREDNISOLONE SODIUM SUCCINATE 60 MG: 40 INJECTION, POWDER, LYOPHILIZED, FOR SOLUTION INTRAMUSCULAR; INTRAVENOUS at 12:33

## 2021-02-13 RX ADMIN — LORAZEPAM 1 MG: 2 INJECTION INTRAMUSCULAR; INTRAVENOUS at 03:59

## 2021-02-13 RX ADMIN — LEVETIRACETAM 500 MG: 500 TABLET, FILM COATED ORAL at 19:44

## 2021-02-13 RX ADMIN — URSODIOL 600 MG: 300 CAPSULE ORAL at 13:11

## 2021-02-13 RX ADMIN — CEFEPIME 2 G: 2 INJECTION, POWDER, FOR SOLUTION INTRAVENOUS at 21:16

## 2021-02-13 RX ADMIN — URSODIOL 600 MG: 300 CAPSULE ORAL at 19:44

## 2021-02-13 RX ADMIN — MICAFUNGIN SODIUM 150 MG: 50 INJECTION, POWDER, LYOPHILIZED, FOR SOLUTION INTRAVENOUS at 11:01

## 2021-02-13 RX ADMIN — LEVETIRACETAM 500 MG: 500 TABLET, FILM COATED ORAL at 09:05

## 2021-02-13 RX ADMIN — CEFEPIME 2 G: 2 INJECTION, POWDER, FOR SOLUTION INTRAVENOUS at 12:33

## 2021-02-13 RX ADMIN — DEXTROSE MONOHYDRATE 0.03 MG/KG/DAY: 5 INJECTION, SOLUTION INTRAVENOUS at 01:03

## 2021-02-13 RX ADMIN — LORAZEPAM 1 MG: 2 INJECTION INTRAMUSCULAR; INTRAVENOUS at 22:13

## 2021-02-13 RX ADMIN — MYCOPHENOLATE MOFETIL 960 MG: 500 INJECTION, POWDER, LYOPHILIZED, FOR SOLUTION INTRAVENOUS at 06:07

## 2021-02-13 RX ADMIN — DIPHENHYDRAMINE HYDROCHLORIDE 50 MG: 50 INJECTION, SOLUTION INTRAMUSCULAR; INTRAVENOUS at 12:33

## 2021-02-13 RX ADMIN — MYCOPHENOLATE MOFETIL 960 MG: 500 INJECTION, POWDER, LYOPHILIZED, FOR SOLUTION INTRAVENOUS at 22:14

## 2021-02-13 RX ADMIN — PANTOPRAZOLE SODIUM 40 MG: 40 TABLET, DELAYED RELEASE ORAL at 19:44

## 2021-02-13 RX ADMIN — ACYCLOVIR SODIUM 600 MG: 50 INJECTION, SOLUTION INTRAVENOUS at 17:49

## 2021-02-13 RX ADMIN — LORAZEPAM 1 MG: 2 INJECTION INTRAMUSCULAR; INTRAVENOUS at 17:49

## 2021-02-13 RX ADMIN — ACETAMINOPHEN 500 MG: 500 TABLET ORAL at 12:33

## 2021-02-13 RX ADMIN — FLUDARABINE PHOSPHATE 88 MG: 25 INJECTION, SOLUTION INTRAVENOUS at 00:00

## 2021-02-13 RX ADMIN — ANTI-THYMOCYTE GLOBULIN (RABBIT) 125 MG: 5 INJECTION, POWDER, LYOPHILIZED, FOR SOLUTION INTRAVENOUS at 13:10

## 2021-02-13 ASSESSMENT — MIFFLIN-ST. JEOR: SCORE: 1607.37

## 2021-02-13 NOTE — PLAN OF CARE
Temp high 101.4 oral this morning. Got tylenol pre med for ATG at 1230.Blood cultures sent on previous shift. Temp 100.9 prior to start of ATG. Other VSS, lungs clear. Sats high 90s on room air. ATG over 4 hours today.   Stated he could feel mucositis and dry mouth setting in. Stated magic mouthwash helped in the past, although he does not particularly care for it. NP graham Shin informed. Patient declined any prn meds offered for this.   In bed most of the shift, trying to sleep but wakes easily. Room darkened all shift.   Denies nausea when asked.   Sudden onset diarrhea this afternoon. Patient did not use sani pans, went in toilet and flushed. Nurse unable to see volume or consistency. Loose stool per patient.  Mom here this morning and Dad here this afternoon.   Hourly rounding completed. Continue with plan of care.

## 2021-02-13 NOTE — PLAN OF CARE
Chemotherapy    Type Given: Fludarabine and Busulfan  Blood return present: Yes , on both lumen of the Galvan  Nausea/Vomiting Present: Yes. Maintained on Zofran drip.  Additional Medications given: PRN Benadryl given 1X for nausea.     Patient had a temperature max of 101.8F, MD. Zika aware. Blood culture done on both lumen of the Galvan, Cefepime started as ordered. Slightly tachycardic, other vital signs are within parameter. Lung sounds clear bilaterally, SaO2 in the upper 90's on room air. Patient intermittently nauseous, emesis 1X PRN benadryl given 1X at the beginning of the shift. Voiding, had 1X loose stool that patient didn't save. Started on tacrolimus drip, continues on zofran drip. Hourly rounding completed. Continue to monitor and refer for any concerns.

## 2021-02-13 NOTE — PROGRESS NOTES
Pediatric BMT Daily Progress Note    Interval Events: Isael had a fever early this AM for which blood cultures were obtained and IV antibiotics initiated. Hemodynamically stable and feeling okay. Nausea/emesis stable, snacking a little throughout the day.    Review of Systems: Pertinent positives include those mentioned in interval events. A complete review of systems was performed and is otherwise negative.      Medications:  Please see MAR    Physical Exam:    Temp:  [97.8  F (36.6  C)-101.8  F (38.8  C)] 101.4  F (38.6  C)  Pulse:  [] 105  Resp:  [12-28] 28  BP: ()/(44-67) 94/49  SpO2:  [96 %-100 %] 97 %  I/O last 3 completed shifts:  In: 3266.52 [P.O.:380; I.V.:2221; IV Piggyback:665.52]  Out: 3225 [Urine:2625; Emesis/NG output:600]    GEN: Interactive, pleasant and cooperative, lying in bed. NAD. Mother present.  HEENT: Head NC/AT, PERRL, EOMs intact, sclerae clear, nares patent, MMM. Posterior OP erythematous.  NECK: Supple.   CARD: RRR, normal S1/S2 without murmur  RESP: Lungs clear to auscultation bilaterally. Normal work of breathing. No adventitious lung sounds  ABD: Soft, NT, ND, no organomegaly, normal bowel sounds  EXTREM: WWP, MAEE  SKIN: Dry skin; Intact without erythema or rash on exposed skin surfaces  NEURO: No focal deficits  ACCESS: Double lumen burns, port (not accessed)    Labs: All reviewed with pertinent positives: Hgb 12.9, Plts 94k; BUN 9, Creat 0.75    Assessment and Plan: Artemio is a 19 yo male with history of multiply relapsed ALL undergoing conditioning/UCBT per 2013-09. Continues with conditioning, clinically stable. Febrile this morning for which he is now on empiric IV antibiotics.     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 No evidence of  leukemia on day +21 procedures.  - Preparative regimen per MT2013-09 with Thiotepa (days -7 trough -6), Fludarabine (days -5 through -3), Busulfan (days -5 through -3), ATG (days -4 through -2), Rest day (day -1), UCBT (2/16).   - Disease (BMBx) and donor evaluations due day +21     # Risk for GVHD: Tacrolimus and MMF to begin today (day -3). Initial tacro level tomorrow.    FEN/Renal:  # Risk for malnutrition:   - Regular diet as tolerated  - Dietician to follow  - TPN anticipated to start post-BMT    # Risk for electrolyte abnormalities:    - Check daily electrolytes following admission     # Risk for renal dysfunction and fluid overload: GFR (1/28): 119 mls/min  - Monitor I/O's and daily weights  - Maintenance IV fluids due to decreased fluid intake     # Risk for aHUS/TA-TMA:  .  - Monitor LDH qMon/Thurs: 205 (2/11)  - Monitor urine protein/creatinine qTuesday     Pulmonary:  # Risk for pulmonary insufficiency: No current concerns    Cardiovascular:  # Asymptomatic WPW syndrome (diagnosed 2018): no interventions to date. Most recent EKG (1/27). ECHO (1/26) normal, EF 64%. Cardiology following.   - Obtain weekly BNP (219 on 2/9) and troponin (<0.015 on 2/9)  - Follow up ECHO in 6 mos (7/2021)  - Follow up 24h Zio patch holter (1/27-1/28)-- cardiology called 2/10- reading still pending, reuslts are typically not available for 2 weeks after test completion.       # Risk for hypertension secondary to medications: Closely monitor following admission.     Heme:   # Pancytopenia secondary to chemotherapy  - Transfuse for hemoglobin < 7, platelets < 10,000  - Hx of transfusion reactions (hives): Premedicate with tylenol and benadryl prior to pRBCs and plts, although avoid tylenol until Monday due to Busulfan  - GCSF 5 mcg/kg IV daily to begin day +5 per protocol and continue until ANC >/= 2500 x 3 consecutive days.     Infectious Disease:  # Fever in context of BMT conditioning and central access: Tmax 101.8,  hemodynamically stable  - Follow Blood cxs (NGTD). Of note, also has un-accessed port-a-cath.   - Continue empiric Cefepime    # Risk for infection given immunocompromised status with need for prophylaxis:  - Viral (CMV/HSV sero positive): HD Acyclovir. Weekly CMV neg 2/10.  - Fungal: Micafungin. Transition to either itraconazole or posaconazole when able  - Bacterial: Levaquin to start day -1  - PCP: Pentamidine given 1/19, next ordered for 2/16. Consider Bactrim next month if WBC criteria met (no hx of adverse reaction).     Past infections: C. Diff (November 2020)     GI:   # Nausea management:   - Zofran gtt; ativan q6h  - Benadryl prn     # Reflux:  - BID protonix   - PRN Tums      # Risk for VOD  - Ursodiol TID      Neuro:  # Mucositis: emerging  - Tylenol PRN--- starting Monday due to Busulfan, add morphine PRN  - Morphine gtt when indicated      # Seizure prophylaxis: Keppra ppx, will complete this evening     # History of significant spinal headaches: consider using Candelario needle with lumbar punctures    # CRS history. 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 No CRS, neurotoxicity or infectious complications from [place] CAR-T.     Fertility: Sperm collected previously collected for fertility preservation     Discharge Considerations: Expected lengths of hospitalization for patients undergoing stem cell transplantation vary by primary diagnosis, conditioning regimen, graft source, and development of complications. A typical stay is 6 weeks.    The above plan of care was developed by and communicated to me by the   Pediatric BMT attending physician, Viky Zavala MD.     BENJA Rose-AC  [facility] Blood and Marrow Transplant  [address] [address]  [phone]  [phone]    Pediatric BMT Attending Inpatient  Note:  Artemio was seen and evaluated by me today.      The significant interval history includes febrile this am.  Intermittent nausea and emesis.  BP softer while sleeping, but ok.     I have reviewed changes and data from the last 24 hours, including medications, laboratory results, vital signs and pathology results.      I have formulated and discussed the plan with the BMT team. The relevant clinical topics addressed included the followin21 y/o M w/ h/o multiply relapsed Pre-B cell ALL (s/p MSD BMT, CART-19, CART-22) in CR3 admitted for consolidation of remission with MAC (FluBuTT ATG) followed by an UCBT, risk for malnutrition, risk for nausea, risk for VOD, risk for gastritis, risk for opportunistic infection on micafungin, valtrex and pentamidine and cefepime for fever, risk of GVHD- on tacro and MMF, risk of mucositis- morphine PRN, risk for seizures increased on Busulfan, WPW, monitoring pro-BNP and troponins, recent Ziopatch read pending.       I discussed the course and plan with the patient/family and answered all of their questions to the best of my ability.     My care coordination activities today include oversight of planned lab studies, oversight of medication changes and discussion with BMT team-members.      My total floor time today was at least 40 minutes, greater than 50% of which was counseling and coordination of care.    Viky Zavala MD, PhD    Pediatric Blood and Marrow Transplant  [facility]            Patient Active Problem List   Diagnosis     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)     Aaron-Parkinson-White (WPW) syndrome     Bone marrow transplant candidate     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)     At risk for infection     S/P allogeneic bone marrow transplant (H)     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     Examination of participant or  control in clinical research

## 2021-02-13 NOTE — PLAN OF CARE
Temp max=100.3 oral Other vital signs stable. Temp down to 99.3 oral after ATG finished. Denies pain. Had large emesis X 1 Receiving scheduled Ativan and Zofran drip with relief. Voiding well. Plan is to receive chemo tonight and ATG tomorrow. Mother at bedside, attentive to patient.

## 2021-02-14 LAB
ABO + RH BLD: NORMAL
ABO + RH BLD: NORMAL
ANION GAP SERPL CALCULATED.3IONS-SCNC: 6 MMOL/L (ref 3–14)
BLD GP AB SCN SERPL QL: NORMAL
BLOOD BANK CMNT PATIENT-IMP: NORMAL
BUN SERPL-MCNC: 10 MG/DL (ref 7–30)
CALCIUM SERPL-MCNC: 8.6 MG/DL (ref 8.5–10.1)
CHLORIDE SERPL-SCNC: 109 MMOL/L (ref 94–109)
CO2 SERPL-SCNC: 26 MMOL/L (ref 20–32)
CREAT SERPL-MCNC: 0.6 MG/DL (ref 0.66–1.25)
GFR SERPL CREATININE-BSD FRML MDRD: >90 ML/MIN/{1.73_M2}
GLUCOSE SERPL-MCNC: 131 MG/DL (ref 70–99)
HGB BLD-MCNC: 12.5 G/DL (ref 13.3–17.7)
PLATELET # BLD AUTO: 73 10E9/L (ref 150–450)
POTASSIUM SERPL-SCNC: 3.2 MMOL/L (ref 3.4–5.3)
SODIUM SERPL-SCNC: 141 MMOL/L (ref 133–144)
SPECIMEN EXP DATE BLD: NORMAL
TACROLIMUS BLD-MCNC: 8.7 UG/L (ref 5–15)
TME LAST DOSE: NORMAL H

## 2021-02-14 PROCEDURE — 250N000013 HC RX MED GY IP 250 OP 250 PS 637: Performed by: PEDIATRICS

## 2021-02-14 PROCEDURE — 250N000013 HC RX MED GY IP 250 OP 250 PS 637: Performed by: NURSE PRACTITIONER

## 2021-02-14 PROCEDURE — 85049 AUTOMATED PLATELET COUNT: CPT | Performed by: PEDIATRICS

## 2021-02-14 PROCEDURE — 80197 ASSAY OF TACROLIMUS: CPT | Performed by: NURSE PRACTITIONER

## 2021-02-14 PROCEDURE — 80048 BASIC METABOLIC PNL TOTAL CA: CPT | Performed by: PEDIATRICS

## 2021-02-14 PROCEDURE — 258N000003 HC RX IP 258 OP 636: Performed by: PEDIATRICS

## 2021-02-14 PROCEDURE — 250N000011 HC RX IP 250 OP 636: Performed by: NURSE PRACTITIONER

## 2021-02-14 PROCEDURE — 250N000009 HC RX 250: Performed by: NURSE PRACTITIONER

## 2021-02-14 PROCEDURE — 250N000011 HC RX IP 250 OP 636: Performed by: PEDIATRICS

## 2021-02-14 PROCEDURE — 258N000001 HC RX 258: Performed by: NURSE PRACTITIONER

## 2021-02-14 PROCEDURE — 85018 HEMOGLOBIN: CPT | Performed by: PEDIATRICS

## 2021-02-14 PROCEDURE — 86850 RBC ANTIBODY SCREEN: CPT | Performed by: PEDIATRICS

## 2021-02-14 PROCEDURE — 86901 BLOOD TYPING SEROLOGIC RH(D): CPT | Performed by: PEDIATRICS

## 2021-02-14 PROCEDURE — 206N000001 HC R&B BMT UMMC

## 2021-02-14 PROCEDURE — 86900 BLOOD TYPING SEROLOGIC ABO: CPT | Performed by: PEDIATRICS

## 2021-02-14 PROCEDURE — 258N000003 HC RX IP 258 OP 636: Performed by: NURSE PRACTITIONER

## 2021-02-14 PROCEDURE — 99233 SBSQ HOSP IP/OBS HIGH 50: CPT | Performed by: PEDIATRICS

## 2021-02-14 RX ORDER — ACETAMINOPHEN 500 MG
500 TABLET ORAL EVERY 6 HOURS PRN
Status: DISCONTINUED | OUTPATIENT
Start: 2021-02-15 | End: 2021-03-15 | Stop reason: HOSPADM

## 2021-02-14 RX ADMIN — MYCOPHENOLATE MOFETIL 960 MG: 500 INJECTION, POWDER, LYOPHILIZED, FOR SOLUTION INTRAVENOUS at 22:05

## 2021-02-14 RX ADMIN — PANTOPRAZOLE SODIUM 40 MG: 40 TABLET, DELAYED RELEASE ORAL at 07:55

## 2021-02-14 RX ADMIN — LORAZEPAM 1 MG: 2 INJECTION INTRAMUSCULAR; INTRAVENOUS at 17:40

## 2021-02-14 RX ADMIN — DEXTROSE AND SODIUM CHLORIDE: 5; 450 INJECTION, SOLUTION INTRAVENOUS at 22:04

## 2021-02-14 RX ADMIN — ONDANSETRON 1 MG/HR: 2 INJECTION INTRAMUSCULAR; INTRAVENOUS at 13:08

## 2021-02-14 RX ADMIN — ANTI-THYMOCYTE GLOBULIN (RABBIT) 125 MG: 5 INJECTION, POWDER, LYOPHILIZED, FOR SOLUTION INTRAVENOUS at 13:08

## 2021-02-14 RX ADMIN — CEFEPIME 2 G: 2 INJECTION, POWDER, FOR SOLUTION INTRAVENOUS at 12:38

## 2021-02-14 RX ADMIN — URSODIOL 600 MG: 300 CAPSULE ORAL at 14:34

## 2021-02-14 RX ADMIN — ACYCLOVIR SODIUM 600 MG: 50 INJECTION, SOLUTION INTRAVENOUS at 08:13

## 2021-02-14 RX ADMIN — ACYCLOVIR SODIUM 600 MG: 50 INJECTION, SOLUTION INTRAVENOUS at 00:07

## 2021-02-14 RX ADMIN — MICAFUNGIN SODIUM 150 MG: 50 INJECTION, POWDER, LYOPHILIZED, FOR SOLUTION INTRAVENOUS at 11:31

## 2021-02-14 RX ADMIN — MYCOPHENOLATE MOFETIL 960 MG: 500 INJECTION, POWDER, LYOPHILIZED, FOR SOLUTION INTRAVENOUS at 14:34

## 2021-02-14 RX ADMIN — CEFEPIME 2 G: 2 INJECTION, POWDER, FOR SOLUTION INTRAVENOUS at 04:14

## 2021-02-14 RX ADMIN — PANTOPRAZOLE SODIUM 40 MG: 40 TABLET, DELAYED RELEASE ORAL at 20:32

## 2021-02-14 RX ADMIN — LEVETIRACETAM 500 MG: 500 TABLET, FILM COATED ORAL at 20:32

## 2021-02-14 RX ADMIN — DEXTROSE AND SODIUM CHLORIDE: 5; 450 INJECTION, SOLUTION INTRAVENOUS at 00:06

## 2021-02-14 RX ADMIN — MYCOPHENOLATE MOFETIL 960 MG: 500 INJECTION, POWDER, LYOPHILIZED, FOR SOLUTION INTRAVENOUS at 06:05

## 2021-02-14 RX ADMIN — URSODIOL 600 MG: 300 CAPSULE ORAL at 20:32

## 2021-02-14 RX ADMIN — LEVETIRACETAM 500 MG: 500 TABLET, FILM COATED ORAL at 07:55

## 2021-02-14 RX ADMIN — LORAZEPAM 1 MG: 2 INJECTION INTRAMUSCULAR; INTRAVENOUS at 23:15

## 2021-02-14 RX ADMIN — LORAZEPAM 1 MG: 2 INJECTION INTRAMUSCULAR; INTRAVENOUS at 04:14

## 2021-02-14 RX ADMIN — DIPHENHYDRAMINE HYDROCHLORIDE 50 MG: 50 INJECTION, SOLUTION INTRAMUSCULAR; INTRAVENOUS at 12:37

## 2021-02-14 RX ADMIN — METHYLPREDNISOLONE SODIUM SUCCINATE 60 MG: 40 INJECTION, POWDER, LYOPHILIZED, FOR SOLUTION INTRAMUSCULAR; INTRAVENOUS at 12:37

## 2021-02-14 RX ADMIN — DEXTROSE AND SODIUM CHLORIDE: 5; 450 INJECTION, SOLUTION INTRAVENOUS at 00:07

## 2021-02-14 RX ADMIN — DEXTROSE MONOHYDRATE 0.04 MG/KG/DAY: 5 INJECTION, SOLUTION INTRAVENOUS at 20:32

## 2021-02-14 RX ADMIN — ACYCLOVIR SODIUM 600 MG: 50 INJECTION, SOLUTION INTRAVENOUS at 23:55

## 2021-02-14 RX ADMIN — CEFEPIME 2 G: 2 INJECTION, POWDER, FOR SOLUTION INTRAVENOUS at 20:42

## 2021-02-14 RX ADMIN — ACYCLOVIR SODIUM 600 MG: 50 INJECTION, SOLUTION INTRAVENOUS at 17:40

## 2021-02-14 RX ADMIN — URSODIOL 600 MG: 300 CAPSULE ORAL at 07:55

## 2021-02-14 RX ADMIN — ACETAMINOPHEN 500 MG: 500 TABLET ORAL at 12:37

## 2021-02-14 RX ADMIN — LORAZEPAM 1 MG: 2 INJECTION INTRAMUSCULAR; INTRAVENOUS at 10:30

## 2021-02-14 ASSESSMENT — MIFFLIN-ST. JEOR: SCORE: 1607.37

## 2021-02-14 NOTE — PROGRESS NOTES
Pediatric BMT Daily Progress Note    Interval Events: Isael's most recent fever was at noon yesterday. Continues on empiric Cefepime, blood cultures NGTD. Pain and nausea currently controlled. Day -2 today, due for final dose of ATG.    Review of Systems: Pertinent positives include those mentioned in interval events. A complete review of systems was performed and is otherwise negative.      Medications:  Please see MAR    Physical Exam:    Temp:  [98.1  F (36.7  C)-101.4  F (38.6  C)] 98.6  F (37  C)  Pulse:  [78-95] 90  Resp:  [16-26] 18  BP: ()/(52-68) 109/68  SpO2:  [96 %-99 %] 99 %  I/O last 3 completed shifts:  In: 3490.46 [P.O.:500; I.V.:2690.46; IV Piggyback:300]  Out: 1620 [Urine:1620]    GEN: Interactive, pleasant and cooperative, lying in bed. NAD. Mother present.  HEENT: Head NC/AT, PERRL, EOMs intact, sclerae clear, nares patent, MMM. Posterior OP erythematous.  NECK: Supple.   CARD: RRR, normal S1/S2 without murmur  RESP: Lungs clear to auscultation bilaterally. Normal work of breathing. No adventitious lung sounds  ABD: Soft, NT, ND, no organomegaly, normal bowel sounds  EXTREM: WWP, MAEE  SKIN: Dry skin; Intact without erythema or rash on exposed skin surfaces  NEURO: No focal deficits  ACCESS: Double lumen burns, port (not accessed)    Labs: All reviewed with pertinent positives: Hgb 12.5, Plts 73k; BUN 10, Creat 0.6    Assessment and Plan: Artemio is a 21 yo male with history of multiply relapsed ALL undergoing conditioning/UCBT per 2013-09. Continues with conditioning, clinically stable. Afebrile for nearly 24 hours, blood cultures negative. Mucositis emerging, but pain and nausea currently controlled.    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 No evidence of  leukemia on day +21 procedures.  - Preparative regimen per MT2013-09 with Thiotepa (days -7 trough -6), Fludarabine (days -5 through -3), Busulfan (days -5 through -3), ATG (days -4 through -2), Rest day (day -1), UCBT (2/16).   - Disease (BMBx) and donor evaluations due day +21     # Risk for GVHD:   - Continue Tacrolimus, goal 10-15 through day +14. Initial level pending from today.  - Continue MMF    FEN/Renal:  # Risk for malnutrition:   - Regular diet as tolerated  - Dietician to follow  - TPN anticipated to start post-BMT    # Risk for electrolyte abnormalities:    - Check daily electrolytes following admission     # Risk for renal dysfunction and fluid overload: GFR (1/28): 119 mls/min  - Monitor I/O's and daily weights  - Maintenance IV fluids due to decreased fluid intake     # Risk for aHUS/TA-TMA:  .  - Monitor LDH qMon/Thurs: 205 (2/11)  - Monitor urine protein/creatinine qTuesday     Pulmonary:  # Risk for pulmonary insufficiency: No current concerns    Cardiovascular:  # Asymptomatic WPW syndrome (diagnosed 2018): no interventions to date. Most recent EKG (1/27). ECHO (1/26) normal, EF 64%. Cardiology following.   - Obtain weekly BNP (219 on 2/9) and troponin (<0.015 on 2/9)  - Follow up ECHO in 6 mos (7/2021)  - Follow up 24h Zio patch holter (1/27-1/28)-- cardiology called 2/10- reading still pending, reuslts are typically not available for 2 weeks after test completion. Please call on Monday.     # Risk for hypertension secondary to medications: Closely monitor following admission.     Heme:   # Pancytopenia secondary to chemotherapy  - Transfuse for hemoglobin < 7, platelets < 10,000  - Hx of transfusion reactions (hives): Premedicate with tylenol and benadryl prior to pRBCs and plts, although avoid tylenol until Monday due to Busulfan  - GCSF 5 mcg/kg IV daily to begin day +5 per protocol and continue until ANC >/= 2500 x 3 consecutive days.     Infectious Disease:  # Fever in context of BMT  conditioning and central access (2/13): Tmax 101.4 past 24h, hemodynamically stable  - Follow Blood cxs (NGTD). Of note, also has un-accessed port-a-cath.   - Continue empiric Cefepime    # Risk for infection given immunocompromised status with need for prophylaxis:  - Viral (CMV/HSV sero positive): HD Acyclovir. Weekly CMV neg 2/10.  - Fungal: Micafungin. Transition to either itraconazole or posaconazole when able  - Bacterial: Levaquin to start day -1  - PCP: Pentamidine given 1/19, next ordered for 2/16. Consider Bactrim next month if WBC criteria met (no hx of adverse reaction).     Past infections: C. Diff (November 2020)     GI:   # Nausea management:   - Zofran gtt; ativan q6h  - Benadryl prn     # Reflux:  - BID protonix   - PRN Tums      # Risk for VOD  - Ursodiol TID      Neuro:  # Mucositis: emerging  - tylenol PRN starting tomorrow  - morphine PRN     # Seizure prophylaxis: Keppra ppx, will complete this evening     # History of significant spinal headaches: consider using Candelario needle with lumbar punctures    # CRS history. 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 No CRS, neurotoxicity or infectious complications from [place] CAR-T.     Fertility: Sperm collected previously collected for fertility preservation     Discharge Considerations: Expected lengths of hospitalization for patients undergoing stem cell transplantation vary by primary diagnosis, conditioning regimen, graft source, and development of complications. A typical stay is 6 weeks.    The above plan of care was developed by and communicated to me by the   Pediatric BMT attending physician, Viky Zavala MD.     BENJA Rose-AC  [facility] Blood and Marrow Transplant  [address] [address]  [phone]  [phone]    Pediatric BMT Attending Inpatient  Note:  Artemio was seen and evaluated by me today.      The significant interval history includes loose stools.   Has been afebrile since noon.  ATG today. Keppra through today.     I have reviewed changes and data from the last 24 hours, including medications, laboratory results, vital signs and pathology results.      I have formulated and discussed the plan with the BMT team. The relevant clinical topics addressed included the followin21 y/o M w/ h/o multiply relapsed Pre-B cell ALL (s/p MSD BMT, CART-19, CART-22) in CR3 admitted for consolidation of remission with MAC (FluBuTT ATG) followed by an UCBT, risk for malnutrition, risk for nausea, risk for VOD, risk for gastritis, risk for opportunistic infection on micafungin, valtrex and pentamidine and cefepime, risk of GVHD- on tacro and MMF, risk of mucositis- morphine PRN, WPW, monitoring pro-BNP and troponins, recent Ziopatch read pending. Tacro level today.      I discussed the course and plan with the patient/family and answered all of their questions to the best of my ability.     My care coordination activities today include oversight of planned lab studies, oversight of medication changes and discussion with BMT team-members.      My total floor time today was at least 40 minutes, greater than 50% of which was counseling and coordination of care.    Viky Zavala MD, PhD    Pediatric Blood and Marrow Transplant  [facility]        Patient Active Problem List   Diagnosis     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)     Aaron-Parkinson-White (WPW) syndrome     Bone marrow transplant candidate     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)     At risk for infection     S/P allogeneic bone marrow transplant (H)     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     Examination of participant or control in clinical research

## 2021-02-14 NOTE — PLAN OF CARE
VSS, lungs clear. No fever spikes yet, though he is in the 99's.  He denies pain or nausea. He has not eaten anything yet today and has slept most of the day, waking only to watch a game. I's and O's will not be accurate as he has removed the hats from the toilet. His ATG was started shortly after 1PM, after pre-meds of benadryl, tylenol, and Solu-Medrol. He is tolerating it well so far.   He is in the shower at this writing.   Continue with the POC.

## 2021-02-14 NOTE — PLAN OF CARE
Temp max=99.1 ax, afebrile post second ATG dose. Other vital signs stable. Denies pain or nausea. On continuous Zofran drip and scheduled Ativan as antiemetic. Had watery diarrhea stool X 1 (not saved to measure) Up to shower and cooperative with cares. Plan to receive last ATG dose tomorrow.

## 2021-02-14 NOTE — PLAN OF CARE
Patient has been afebrile, other vital signs are within parameter. Lung sounds clear bilaterally, SaO2 in the upper 90's on room air. Patient slept fairly. Continues on Zofran and Tacrolimus drip. Voided and had 1X stool ( patient didn't save urine and stool to measure ), unable to see volume and consistency. Hourly rounding completed. Continue to monitor and refer for any concern.

## 2021-02-15 LAB
ANION GAP SERPL CALCULATED.3IONS-SCNC: 7 MMOL/L (ref 3–14)
BILIRUB DIRECT SERPL-MCNC: 0.3 MG/DL (ref 0–0.2)
BUN SERPL-MCNC: 8 MG/DL (ref 7–30)
CALCIUM SERPL-MCNC: 8.1 MG/DL (ref 8.5–10.1)
CHLORIDE SERPL-SCNC: 106 MMOL/L (ref 94–109)
CO2 SERPL-SCNC: 25 MMOL/L (ref 20–32)
CREAT SERPL-MCNC: 0.57 MG/DL (ref 0.66–1.25)
GFR SERPL CREATININE-BSD FRML MDRD: >90 ML/MIN/{1.73_M2}
GLUCOSE SERPL-MCNC: 229 MG/DL (ref 70–99)
HGB BLD-MCNC: 11.2 G/DL (ref 13.3–17.7)
LDH SERPL L TO P-CCNC: 138 U/L (ref 85–227)
Lab: NORMAL
MAGNESIUM SERPL-MCNC: 1.6 MG/DL (ref 1.6–2.3)
PHOSPHATE SERPL-MCNC: 2.1 MG/DL (ref 2.5–4.5)
PLATELET # BLD AUTO: 45 10E9/L (ref 150–450)
POTASSIUM SERPL-SCNC: 3.4 MMOL/L (ref 3.4–5.3)
SODIUM SERPL-SCNC: 138 MMOL/L (ref 133–144)
SPECIMEN SOURCE: NORMAL
TACROLIMUS BLD-MCNC: 15.2 UG/L (ref 5–15)
TME LAST DOSE: ABNORMAL H
YEAST SPEC QL CULT: NORMAL

## 2021-02-15 PROCEDURE — 258N000003 HC RX IP 258 OP 636: Performed by: NURSE PRACTITIONER

## 2021-02-15 PROCEDURE — 83735 ASSAY OF MAGNESIUM: CPT | Performed by: PEDIATRICS

## 2021-02-15 PROCEDURE — 82248 BILIRUBIN DIRECT: CPT | Performed by: PEDIATRICS

## 2021-02-15 PROCEDURE — 206N000001 HC R&B BMT UMMC

## 2021-02-15 PROCEDURE — 250N000013 HC RX MED GY IP 250 OP 250 PS 637: Performed by: NURSE PRACTITIONER

## 2021-02-15 PROCEDURE — 250N000011 HC RX IP 250 OP 636: Performed by: PEDIATRICS

## 2021-02-15 PROCEDURE — 80197 ASSAY OF TACROLIMUS: CPT | Performed by: NURSE PRACTITIONER

## 2021-02-15 PROCEDURE — 250N000009 HC RX 250: Performed by: NURSE PRACTITIONER

## 2021-02-15 PROCEDURE — 258N000003 HC RX IP 258 OP 636: Performed by: PEDIATRICS

## 2021-02-15 PROCEDURE — 99233 SBSQ HOSP IP/OBS HIGH 50: CPT | Performed by: PEDIATRICS

## 2021-02-15 PROCEDURE — 250N000011 HC RX IP 250 OP 636: Performed by: NURSE PRACTITIONER

## 2021-02-15 PROCEDURE — 250N000011 HC RX IP 250 OP 636: Performed by: STUDENT IN AN ORGANIZED HEALTH CARE EDUCATION/TRAINING PROGRAM

## 2021-02-15 PROCEDURE — 85018 HEMOGLOBIN: CPT | Performed by: PEDIATRICS

## 2021-02-15 PROCEDURE — 250N000009 HC RX 250: Performed by: PEDIATRICS

## 2021-02-15 PROCEDURE — 83615 LACTATE (LD) (LDH) ENZYME: CPT | Performed by: PEDIATRICS

## 2021-02-15 PROCEDURE — 85049 AUTOMATED PLATELET COUNT: CPT | Performed by: PEDIATRICS

## 2021-02-15 PROCEDURE — 80048 BASIC METABOLIC PNL TOTAL CA: CPT | Performed by: PEDIATRICS

## 2021-02-15 PROCEDURE — 999N000147 HC STATISTIC PT IP EVAL DEFER

## 2021-02-15 PROCEDURE — 258N000001 HC RX 258: Performed by: NURSE PRACTITIONER

## 2021-02-15 PROCEDURE — 84100 ASSAY OF PHOSPHORUS: CPT | Performed by: PEDIATRICS

## 2021-02-15 PROCEDURE — 250N000013 HC RX MED GY IP 250 OP 250 PS 637: Performed by: PEDIATRICS

## 2021-02-15 RX ADMIN — LORAZEPAM 1 MG: 2 INJECTION INTRAMUSCULAR; INTRAVENOUS at 21:41

## 2021-02-15 RX ADMIN — DIPHENHYDRAMINE HYDROCHLORIDE 50 MG: 50 INJECTION, SOLUTION INTRAMUSCULAR; INTRAVENOUS at 20:53

## 2021-02-15 RX ADMIN — CEFEPIME 2 G: 2 INJECTION, POWDER, FOR SOLUTION INTRAVENOUS at 20:05

## 2021-02-15 RX ADMIN — ACYCLOVIR SODIUM 600 MG: 50 INJECTION, SOLUTION INTRAVENOUS at 08:15

## 2021-02-15 RX ADMIN — LORAZEPAM 1 MG: 2 INJECTION INTRAMUSCULAR; INTRAVENOUS at 04:07

## 2021-02-15 RX ADMIN — LORAZEPAM 1 MG: 2 INJECTION INTRAMUSCULAR; INTRAVENOUS at 10:04

## 2021-02-15 RX ADMIN — DEXTROSE AND SODIUM CHLORIDE: 5; 450 INJECTION, SOLUTION INTRAVENOUS at 08:32

## 2021-02-15 RX ADMIN — MICAFUNGIN SODIUM 150 MG: 50 INJECTION, POWDER, LYOPHILIZED, FOR SOLUTION INTRAVENOUS at 11:50

## 2021-02-15 RX ADMIN — URSODIOL 600 MG: 300 CAPSULE ORAL at 14:28

## 2021-02-15 RX ADMIN — DEXTROSE AND SODIUM CHLORIDE: 5; 450 INJECTION, SOLUTION INTRAVENOUS at 21:41

## 2021-02-15 RX ADMIN — Medication 258 MG: at 10:10

## 2021-02-15 RX ADMIN — MYCOPHENOLATE MOFETIL 960 MG: 500 INJECTION, POWDER, LYOPHILIZED, FOR SOLUTION INTRAVENOUS at 21:42

## 2021-02-15 RX ADMIN — URSODIOL 600 MG: 300 CAPSULE ORAL at 08:32

## 2021-02-15 RX ADMIN — MYCOPHENOLATE MOFETIL 960 MG: 500 INJECTION, POWDER, LYOPHILIZED, FOR SOLUTION INTRAVENOUS at 06:07

## 2021-02-15 RX ADMIN — DEXTROSE MONOHYDRATE 0.03 MG/KG/DAY: 5 INJECTION, SOLUTION INTRAVENOUS at 19:56

## 2021-02-15 RX ADMIN — ACYCLOVIR SODIUM 600 MG: 50 INJECTION, SOLUTION INTRAVENOUS at 16:30

## 2021-02-15 RX ADMIN — CEFEPIME 2 G: 2 INJECTION, POWDER, FOR SOLUTION INTRAVENOUS at 13:07

## 2021-02-15 RX ADMIN — Medication 15 MMOL: at 08:15

## 2021-02-15 RX ADMIN — MYCOPHENOLATE MOFETIL 960 MG: 500 INJECTION, POWDER, LYOPHILIZED, FOR SOLUTION INTRAVENOUS at 14:25

## 2021-02-15 RX ADMIN — CEFEPIME 2 G: 2 INJECTION, POWDER, FOR SOLUTION INTRAVENOUS at 04:06

## 2021-02-15 RX ADMIN — PANTOPRAZOLE SODIUM 40 MG: 40 TABLET, DELAYED RELEASE ORAL at 08:32

## 2021-02-15 RX ADMIN — PANTOPRAZOLE SODIUM 40 MG: 40 TABLET, DELAYED RELEASE ORAL at 19:58

## 2021-02-15 RX ADMIN — URSODIOL 600 MG: 300 CAPSULE ORAL at 19:58

## 2021-02-15 RX ADMIN — LORAZEPAM 1 MG: 2 INJECTION INTRAMUSCULAR; INTRAVENOUS at 16:12

## 2021-02-15 ASSESSMENT — MIFFLIN-ST. JEOR: SCORE: 1604.37

## 2021-02-15 NOTE — PLAN OF CARE
Patient has been afebrile, other vital signs are within parameter. Lung sounds clear bilaterally, SaO2 in the upper 90's on room air. Patient slept fairly this shift. Continues on Zofran and Tacrolimus drip. MD informed of phosphorus and glucose result this a.m. Voiding.  Hourly rounding completed. Continue to monitor and refer for any concerns.

## 2021-02-15 NOTE — PROGRESS NOTES
Pediatric BMT Daily Progress Note    Interval Events: Afebrile and hemodynamically stable. Continues on empiric cefepime, blood cultures NGTD. Intermittent nausea, but well controlled on current antiemetics. No reports of pain, but reports mouth dryness, likely emerging mucositis.     Review of Systems: Pertinent positives include those mentioned in interval events. A complete review of systems was performed and is otherwise negative.      Medications:  Please see MAR    Physical Exam:    Temp:  [98.5  F (36.9  C)-99.2  F (37.3  C)] 98.5  F (36.9  C)  Pulse:  [] 74  Resp:  [16-20] 18  BP: ()/(60-86) 101/60  SpO2:  [98 %-99 %] 98 %  I/O last 3 completed shifts:  In: 2989.48 [I.V.:2689.48; IV Piggyback:300]  Out: 1059 [Urine:1059]    GEN: Interactive, pleasant and cooperative, sitting in bed and playing video games. NAD. Mother present.  HEENT: Head NC/AT, PERRL, EOMs intact, sclerae clear, nares patent, MMM. .   CARD: RRR, normal S1/S2 without murmur.  RESP: Lungs clear to auscultation bilaterally. Normal work of breathing. No adventitious lung sounds.  ABD: Soft, NT, ND, NABS present, no organomegaly or masses noted.   EXTREM: WWP, MAEE  SKIN: Dry skin; Intact without erythema or rash on exposed skin surfaces  NEURO: No focal deficits  ACCESS: Double lumen burns, port (not accessed)    Labs: All reviewed with pertinent positives: Hgb 11.2, Plts 45k; BUN 8, Creat 0.57, phos 2.1.    Assessment and Plan: Artemio is a 19 yo male with history of multiply relapsed ALL undergoing conditioning/UCBT per 2013-09. Day -1 and rest day today, afebrile and clinically stable. Emerging mucositis, but no reports of pain. Pentamidine today for PCP prophylaxis.    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 No evidence of leukemia on day +21 procedures.  - Preparative regimen per [id number] with Thiotepa (days -7 trough -6), Fludarabine (days -5 through -3), Busulfan (days -5 through -3), ATG (days -4 through -2), Rest day (day -1), UCBT (2/16).   - Disease (BMBx) and donor evaluations due day +21     # Risk for GVHD:   - Continue Tacrolimus, goal 10-15 through day +14. Level 8.7 2/14, dose increased, level pending today.   - Continue MMF    FEN/Renal:  # Risk for malnutrition:   - Regular diet as tolerated  - Dietician to follow  - TPN to start post-BMT    # Risk for electrolyte abnormalities:    - Check daily electrolytes   - hypophosphatemia: 2.1 today, NaPhos IV replacement given     # Risk for renal dysfunction and fluid overload: GFR (1/28): 119 mls/min  - Monitor I/O's and daily weights  - Maintenance IV fluids due to decreased fluid intake     # Risk for aHUS/TA-TMA:    - Monitor LDH qMon/Thurs: 138 (2/15)  - Monitor urine protein/creatinine qTuesday     Pulmonary:  # Risk for pulmonary insufficiency: No current concerns    Cardiovascular:  # Asymptomatic WPW syndrome (diagnosed 2018): no interventions to date. Most recent EKG (1/27). ECHO (1/26) normal, EF 64%. Cardiology following.   - Obtain weekly BNP (219 on 2/9) and troponin (<0.015 on 2/9)  - Follow up ECHO in 6 mos (7/2021)  - Follow up 24h Zio patch holter (1/27-1/28)-- cardiology called 2/10- reading still pending, results are typically not available for 2 weeks after test completion. Will reach out to cardiology again today.      # Risk for hypertension secondary to medications: Closely monitor following admission.     Heme:   # Pancytopenia secondary to chemotherapy  - Transfuse for hemoglobin < 7, platelets < 10,000  - Hx of transfusion reactions (hives): Premedicate with tylenol and benadryl prior to pRBCs and plts, although avoid tylenol until Monday due to Busulfan  - GCSF 5 mcg/kg IV daily to begin day +5 per protocol and continue until  ANC >/= 2500 x 3 consecutive days.     Infectious Disease:  # Fever in context of BMT conditioning and central access (2/13): Tmax 101.4 past 24h, hemodynamically stable  - Follow Blood cxs (NGTD). Of note, also has un-accessed port-a-cath.   - Continue empiric Cefepime    # Risk for infection given immunocompromised status with need for prophylaxis:  - Viral (CMV/HSV sero positive): HD Acyclovir. Weekly CMV neg 2/10.  - Fungal: Micafungin. Transition to either itraconazole or posaconazole when able  - Bacterial: Cefepime as above  - PCP: Pentamidine today 2/16. Consider Bactrim next month if WBC criteria met (no hx of adverse reaction).     Past infections: C. Diff (November 2020)     GI:   # Nausea management:   - Zofran gtt; ativan q6h  - Benadryl prn     # Reflux:  - BID protonix   - PRN Tums      # Risk for VOD  - Ursodiol TID      Neuro:  # Mucositis: emerging  - tylenol PRN starting tomorrow  - morphine PRN     # Seizure prophylaxis: s/p Keppra     # History of significant spinal headaches: consider using Candelario needle with lumbar punctures    # CRS history. 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 No CRS, neurotoxicity or infectious complications from [place] CAR-T.     Fertility: Sperm collected previously collected for fertility preservation     Discharge Considerations: Expected lengths of hospitalization for patients undergoing stem cell transplantation vary by primary diagnosis, conditioning regimen, graft source, and development of complications. A typical stay is 6 weeks.    The above plan of care was developed by and communicated to me by the   Pediatric BMT attending physician, Viky Zavala MD.     Gabriella YOUSIF-PC  [facility]  Pediatric Blood and Marrow Transplant      Pediatric BMT Attending Inpatient Note:  Artemio was seen and evaluated by me today.      The  significant interval history flushing his urine.  Rest day today. Afebrile.     I have reviewed changes and data from the last 24 hours, including medications, laboratory results, vital signs and pathology results.      I have formulated and discussed the plan with the BMT team. The relevant clinical topics addressed included the followin21 y/o M w/ h/o multiply relapsed Pre-B cell ALL (s/p MSD BMT, CART-19, CART-22) in CR3 admitted for consolidation of remission with MAC (FluBuTT ATG) followed by an UCBT, risk for malnutrition, risk for nausea-zofran drip and ativan Q6, risk for VOD, risk for gastritis, risk for opportunistic infection on micafungin, valtrex and pentamidine and cefepime, risk of GVHD- on tacro and MMF, risk of mucositis- morphine PRN, WPW, monitoring pro-BNP and troponins, recent Ziopatch read pending.   Increased tacrolimus.     I discussed the course and plan with the patient/family and answered all of their questions to the best of my ability.     My care coordination activities today include oversight of planned lab studies, oversight of medication changes and discussion with BMT team-members.      My total floor time today was at least 40 minutes, greater than 50% of which was counseling and coordination of care.    Viky Zavala MD, PhD    Pediatric Blood and Marrow Transplant  [facility]      Patient Active Problem List   Diagnosis     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)     Aaron-Parkinson-White (WPW) syndrome     Bone marrow transplant candidate     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)     At risk for infection     S/P allogeneic bone marrow transplant (H)     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     Examination of participant or control in clinical research

## 2021-02-15 NOTE — PLAN OF CARE
PT: Attempted to see pt this PM however pt having emesis, noting today is not a good day. Will check back on patient later this week.

## 2021-02-15 NOTE — PLAN OF CARE
AVSS.  No c/o pain.  Pt tolerating small amounts of food.  Had 2 large emesis today.  No PRNs given, Pt states he feels better after them.  Tolerated PO medications.  Tolerated pentamidine infusion.  Mother visiting in AM, father in afternoon.  Pt will change beds this evening after shower and remove egg crate.

## 2021-02-15 NOTE — PLAN OF CARE
Afebrile, vital signs stable. Tolerated last dose ATG without problems. Denies pain or nausea. Taking oral meds fine. Scheduled Ativan and Zofran drip as antiemetic. Plan is to have rest day tomorrow with  BMT on Tuesday.

## 2021-02-16 ENCOUNTER — APPOINTMENT (OUTPATIENT)
Dept: PHYSICAL THERAPY | Facility: CLINIC | Age: 21
DRG: 014 | End: 2021-02-16
Attending: PEDIATRICS
Payer: COMMERCIAL

## 2021-02-16 LAB
ANION GAP SERPL CALCULATED.3IONS-SCNC: 6 MMOL/L (ref 3–14)
BUN SERPL-MCNC: 8 MG/DL (ref 7–30)
CALCIUM SERPL-MCNC: 8.2 MG/DL (ref 8.5–10.1)
CHLORIDE SERPL-SCNC: 107 MMOL/L (ref 94–109)
CO2 SERPL-SCNC: 28 MMOL/L (ref 20–32)
CREAT SERPL-MCNC: 0.64 MG/DL (ref 0.66–1.25)
GFR SERPL CREATININE-BSD FRML MDRD: >90 ML/MIN/{1.73_M2}
GLUCOSE SERPL-MCNC: 134 MG/DL (ref 70–99)
HGB BLD-MCNC: 11.3 G/DL (ref 13.3–17.7)
NT-PROBNP SERPL-MCNC: 664 PG/ML (ref 0–450)
PHOSPHATE SERPL-MCNC: 2.9 MG/DL (ref 2.5–4.5)
PLATELET # BLD AUTO: 31 10E9/L (ref 150–450)
POTASSIUM SERPL-SCNC: 3.2 MMOL/L (ref 3.4–5.3)
SODIUM SERPL-SCNC: 141 MMOL/L (ref 133–144)
TACROLIMUS BLD-MCNC: 15.1 UG/L (ref 5–15)
TME LAST DOSE: ABNORMAL H
TROPONIN I SERPL-MCNC: <0.015 UG/L (ref 0–0.04)

## 2021-02-16 PROCEDURE — 250N000011 HC RX IP 250 OP 636: Performed by: PEDIATRICS

## 2021-02-16 PROCEDURE — 85018 HEMOGLOBIN: CPT | Performed by: PEDIATRICS

## 2021-02-16 PROCEDURE — 3E0436Z INTRODUCTION OF NUTRITIONAL SUBSTANCE INTO CENTRAL VEIN, PERCUTANEOUS APPROACH: ICD-10-PCS | Performed by: PEDIATRICS

## 2021-02-16 PROCEDURE — 97110 THERAPEUTIC EXERCISES: CPT | Mod: GP | Performed by: PHYSICAL THERAPIST

## 2021-02-16 PROCEDURE — 250N000011 HC RX IP 250 OP 636: Performed by: NURSE PRACTITIONER

## 2021-02-16 PROCEDURE — 250N000013 HC RX MED GY IP 250 OP 250 PS 637: Performed by: NURSE PRACTITIONER

## 2021-02-16 PROCEDURE — 83880 ASSAY OF NATRIURETIC PEPTIDE: CPT | Performed by: PEDIATRICS

## 2021-02-16 PROCEDURE — 38240 TRANSPLT ALLO HCT/DONOR: CPT | Performed by: PEDIATRICS

## 2021-02-16 PROCEDURE — 38209 WASH HARVEST STEM CELLS: CPT | Performed by: PEDIATRICS

## 2021-02-16 PROCEDURE — 81378 HLA I & II TYPING HR: CPT | Performed by: PATHOLOGY

## 2021-02-16 PROCEDURE — 815N000004 HC ACQUISITION BONE MARROW NMDP

## 2021-02-16 PROCEDURE — 80197 ASSAY OF TACROLIMUS: CPT | Performed by: NURSE PRACTITIONER

## 2021-02-16 PROCEDURE — 258N000003 HC RX IP 258 OP 636: Performed by: NURSE PRACTITIONER

## 2021-02-16 PROCEDURE — 30243X3 TRANSFUSION OF ALLOGENEIC UNRELATED CORD BLOOD STEM CELLS INTO CENTRAL VEIN, PERCUTANEOUS APPROACH: ICD-10-PCS | Performed by: PEDIATRICS

## 2021-02-16 PROCEDURE — 206N000001 HC R&B BMT UMMC

## 2021-02-16 PROCEDURE — 84484 ASSAY OF TROPONIN QUANT: CPT | Performed by: PEDIATRICS

## 2021-02-16 PROCEDURE — 258N000001 HC RX 258: Performed by: NURSE PRACTITIONER

## 2021-02-16 PROCEDURE — 250N000013 HC RX MED GY IP 250 OP 250 PS 637: Performed by: PEDIATRICS

## 2021-02-16 PROCEDURE — 81382 HLA II TYPING 1 LOC HR: CPT | Performed by: PATHOLOGY

## 2021-02-16 PROCEDURE — 80048 BASIC METABOLIC PNL TOTAL CA: CPT | Performed by: PEDIATRICS

## 2021-02-16 PROCEDURE — 99233 SBSQ HOSP IP/OBS HIGH 50: CPT | Mod: 25 | Performed by: PEDIATRICS

## 2021-02-16 PROCEDURE — 258N000003 HC RX IP 258 OP 636: Performed by: PEDIATRICS

## 2021-02-16 PROCEDURE — 85049 AUTOMATED PLATELET COUNT: CPT | Performed by: PEDIATRICS

## 2021-02-16 PROCEDURE — 250N000009 HC RX 250: Performed by: NURSE PRACTITIONER

## 2021-02-16 PROCEDURE — 250N000009 HC RX 250: Performed by: PEDIATRICS

## 2021-02-16 PROCEDURE — 84100 ASSAY OF PHOSPHORUS: CPT | Performed by: PEDIATRICS

## 2021-02-16 RX ORDER — DIPHENHYDRAMINE HYDROCHLORIDE 50 MG/ML
50 INJECTION INTRAMUSCULAR; INTRAVENOUS ONCE
Status: COMPLETED | OUTPATIENT
Start: 2021-02-16 | End: 2021-02-16

## 2021-02-16 RX ORDER — ACETAMINOPHEN 500 MG
500 TABLET ORAL ONCE
Status: COMPLETED | OUTPATIENT
Start: 2021-02-16 | End: 2021-02-16

## 2021-02-16 RX ADMIN — LORAZEPAM 1 MG: 2 INJECTION INTRAMUSCULAR; INTRAVENOUS at 10:24

## 2021-02-16 RX ADMIN — SODIUM CHLORIDE 125 MG: 9 INJECTION, SOLUTION INTRAVENOUS at 11:38

## 2021-02-16 RX ADMIN — ACYCLOVIR SODIUM 600 MG: 50 INJECTION, SOLUTION INTRAVENOUS at 07:44

## 2021-02-16 RX ADMIN — MYCOPHENOLATE MOFETIL 960 MG: 500 INJECTION, POWDER, LYOPHILIZED, FOR SOLUTION INTRAVENOUS at 06:19

## 2021-02-16 RX ADMIN — CEFEPIME 2 G: 2 INJECTION, POWDER, FOR SOLUTION INTRAVENOUS at 13:16

## 2021-02-16 RX ADMIN — LORAZEPAM 1 MG: 2 INJECTION INTRAMUSCULAR; INTRAVENOUS at 22:32

## 2021-02-16 RX ADMIN — CEFEPIME 2 G: 2 INJECTION, POWDER, FOR SOLUTION INTRAVENOUS at 20:17

## 2021-02-16 RX ADMIN — LORAZEPAM 1 MG: 2 INJECTION INTRAMUSCULAR; INTRAVENOUS at 16:19

## 2021-02-16 RX ADMIN — MYCOPHENOLATE MOFETIL 960 MG: 500 INJECTION, POWDER, LYOPHILIZED, FOR SOLUTION INTRAVENOUS at 14:16

## 2021-02-16 RX ADMIN — DEXTROSE AND SODIUM CHLORIDE: 5; 450 INJECTION, SOLUTION INTRAVENOUS at 06:17

## 2021-02-16 RX ADMIN — LORAZEPAM 1 MG: 2 INJECTION INTRAMUSCULAR; INTRAVENOUS at 03:38

## 2021-02-16 RX ADMIN — ACYCLOVIR SODIUM 600 MG: 50 INJECTION, SOLUTION INTRAVENOUS at 00:45

## 2021-02-16 RX ADMIN — MYCOPHENOLATE MOFETIL 960 MG: 500 INJECTION, POWDER, LYOPHILIZED, FOR SOLUTION INTRAVENOUS at 22:37

## 2021-02-16 RX ADMIN — DIPHENHYDRAMINE HYDROCHLORIDE 50 MG: 50 INJECTION, SOLUTION INTRAMUSCULAR; INTRAVENOUS at 12:05

## 2021-02-16 RX ADMIN — DEXTROSE MONOHYDRATE 0.02 MG/KG/DAY: 5 INJECTION, SOLUTION INTRAVENOUS at 20:00

## 2021-02-16 RX ADMIN — ACETAMINOPHEN 500 MG: 500 TABLET ORAL at 12:05

## 2021-02-16 RX ADMIN — ACYCLOVIR SODIUM 600 MG: 50 INJECTION, SOLUTION INTRAVENOUS at 16:18

## 2021-02-16 RX ADMIN — ONDANSETRON 1 MG/HR: 2 INJECTION INTRAMUSCULAR; INTRAVENOUS at 06:28

## 2021-02-16 RX ADMIN — CEFEPIME 2 G: 2 INJECTION, POWDER, FOR SOLUTION INTRAVENOUS at 04:49

## 2021-02-16 RX ADMIN — I.V. FAT EMULSION 240 ML: 20 EMULSION INTRAVENOUS at 19:38

## 2021-02-16 RX ADMIN — URSODIOL 600 MG: 300 CAPSULE ORAL at 07:44

## 2021-02-16 RX ADMIN — PANTOPRAZOLE SODIUM 40 MG: 40 TABLET, DELAYED RELEASE ORAL at 19:37

## 2021-02-16 RX ADMIN — MICAFUNGIN SODIUM 150 MG: 50 INJECTION, POWDER, LYOPHILIZED, FOR SOLUTION INTRAVENOUS at 10:24

## 2021-02-16 RX ADMIN — PANTOPRAZOLE SODIUM 40 MG: 40 TABLET, DELAYED RELEASE ORAL at 07:44

## 2021-02-16 RX ADMIN — POTASSIUM CHLORIDE: 2 INJECTION, SOLUTION, CONCENTRATE INTRAVENOUS at 19:38

## 2021-02-16 RX ADMIN — URSODIOL 600 MG: 300 CAPSULE ORAL at 19:37

## 2021-02-16 RX ADMIN — URSODIOL 600 MG: 300 CAPSULE ORAL at 13:16

## 2021-02-16 ASSESSMENT — MIFFLIN-ST. JEOR: SCORE: 1604.37

## 2021-02-16 NOTE — PROCEDURES
BMT/Cellular Allogeneic Product Infusion       Patient Vitals for the past 24 hrs:   Temp Temp src Pulse Resp BP   02/15/21 1900 98.6  F (37  C) Oral 68 18 106/66   02/16/21 0000 99  F (37.2  C) Oral 68 20 104/76   02/16/21 0400 98.5  F (36.9  C) Oral 67 18 112/67   02/16/21 0800 98.6  F (37  C) Oral 66 16 107/71   02/16/21 1250 98.4  F (36.9  C) Oral -- -- --   02/16/21 1300 98  F (36.7  C) Oral 67 20 112/83   02/16/21 1310 98.6  F (37  C) Oral 73 22 117/84         BMT INFUSION DOCUMENTATION (last 48 hours)      BMT/Cellular Product Infusion     Row Name 02/16/21 1200                [REMOVED] Product 02/16/21 1214 HPC, Cord Blood    Product Details Product Release Date: 02/16/21  -NB Product Release Time: 1214  -NB Product Type: HPC, Cord Blood  -NB DIN: [id number]  -NB Product Description Code: [id number]  -NB Volume Dispensed (mL): 103 mL  -NB Completion Date (RN to complete): 02/16/21  -CG Completion Time (RN to complete): 1300  -CG    Checked by (Patient RN)  Bri Berumen RN  -CG       Checked by (Witness)  --       Product Volume Infused (mL)  103 mL  -CG       Flush Volume (mL)  40 mL  -CG       Volume Dispensed (mL)  --         User Key  (r) = Recorded By, (t) = Taken By, (c) = Cosigned By    Initials Name Effective Dates    Melanie Kaplan RN 01/16/18 -     Maddison Garg 01/12/21 -         Allogeneic Donor Eligibility Determination and Summary of Records: Eligible        Type of Infusion: Allogeneic      Baseline Pre-Infusion Evaluation (to be completed by Provider):   Dyspnea: Grade 0 - none  Hypoxia: Grade 0 - not present  Fever: Grade 0 - afebrile  Chills: Grade 0 - none  Febrile Neutropenia: Grade 0 - not present  Sinus Bradycardia: Grade 0 - none  Hypertension: Grade 0 - none  Hypotension: Grade 0 - none  Chest Pain: Grade 0 - none  Bronchospasm: Grade 0 - none  Pain: Grade 0 - none  Rash: Grade 0 - None  Neurologic Specify: none    If adverse reactions, events or complications  occur (fever greater than 2 degrees fahrenheit increase, and severe reactions of the following types: chills, dyspnea, bronchospasm, hyper/hypotension, hypoxia, bradycardia, chest pain, back/flank pain, hypoxia, and any other reaction deemed severe or life threatening; any instance of product bag breakage or unusual product appearance)    Any other events that are >= grade 3, then immediately contact the BMT Attending physician, the Cell Therapy Laboratory Medical Director (pager [phone]) and the Cell Therapy Laboratory ([phone]).  After midnight, holidays & weekends contact the [facility] on the appropriate campus ([facility] [place]: [phone]; [facility] [place]: [phone]).    BMT Post Infusion Documentation    Data   Patient Vitals for the past 72 hrs:   Temp Temp src Pulse Resp BP   02/13/21 1600 99.1  F (37.3  C) Oral 80 16 101/61   02/13/21 1735 98.8  F (37.1  C) Oral 81 16 106/61   02/13/21 2000 98.1  F (36.7  C) Axillary 78 16 112/68   02/14/21 0015 98.7  F (37.1  C) Oral 95 18 97/54   02/14/21 0413 98.6  F (37  C) Oral 90 18 109/68   02/14/21 0859 99.1  F (37.3  C) Oral 109 20 104/62   02/14/21 1200 99.2  F (37.3  C) Oral 107 18 98/62   02/14/21 1300 99  F (37.2  C) Oral 105 18 98/66   02/14/21 1703 98.5  F (36.9  C) Oral 80 16 117/86   02/14/21 1900 98.8  F (37.1  C) -- 83 16 123/77   02/14/21 2040 -- -- -- -- 121/81   02/14/21 2353 98.5  F (36.9  C) Oral 92 18 112/71   02/15/21 0400 98.5  F (36.9  C) Oral 74 18 101/60   02/15/21 0812 99.1  F (37.3  C) Oral 64 16 96/60   02/15/21 1017 98.3  F (36.8  C) Oral 78 20 118/83   02/15/21 1100 -- -- 86 -- 121/86   02/15/21 1500 98.7  F (37.1  C) Oral 56 20 109/77   02/15/21 1900 98.6  F (37  C) Oral 68 18 106/66   02/16/21 0000 99  F (37.2  C) Oral 68 20 104/76   02/16/21 0400 98.5  F (36.9  C) Oral 67 18 112/67   02/16/21 0800 98.6  F (37  C) Oral 66 16 107/71   02/16/21 1250 98.4  F (36.9  C)  Oral -- -- --   02/16/21 1300 98  F (36.7  C) Oral 67 20 112/83   02/16/21 1310 98.6  F (37  C) Oral 73 22 117/84        BMT INFUSION DOCUMENTATION (last 24 hours)      BMT/Cellular Product Infusion     Row Name 02/16/21 1200                Cell Therapy Documentation    Product Release Date  02/16/21  -NB       Recipient Study ID  N/A  -NB       Donor  Allogeneic - Unrelated  -NB       Donor MRN/ID  [id number]  -NB       Donor ABO/Rh  A pos  -NB       Allogeneic Donor Eligibility Determination and Summary of Records  Eligible  -NB       Type of Infusion  Allogeneic  -NB       Total Volume Dispensed (mL)  103  -NB       Total NC Dose  2.50E+07  -NB       Total CD34 Dose  N/A  -NB       Total CD3 dose  N/A  -NB       Total NC Dose Left in Storage  NONE  -NB       Comments for Product Issues  N/A  -NB       Donor ABO/Rh  --       Product Types  --       Product Numbers  --       Product Types and Numbers  --       Volume  --       ABO Mismatch  --       ZZTotal NC Dose  --       ZZTotal CD34 Dose  --       ZZTotal NC Dose Left in Storage  --          [REMOVED] Product 02/16/21 1214 HPC, Cord Blood    Product Details Product Release Date: 02/16/21  -NB Product Release Time: 1214  -NB Product Type: HPC, Cord Blood  -NB DIN: [id number]  -NB Product Description Code: [id number]  -NB Volume Dispensed (mL): 103 mL  -NB Completion Date (RN to complete): 02/16/21  -CG Completion Time (RN to complete): 1300  -CG    Checked by (Patient RN)  Bri Berumen RN  -CG       Checked by (Witness)  --       Product Volume Infused (mL)  103 mL  -CG       Flush Volume (mL)  40 mL  -CG       Volume Dispensed (mL)  --          RN Documentation    Patient was premedicated as ordered  yes  -CG       Line Type  central line, right  -CG       Patient Stable Prior to Infusion  yes  -CG       Time Infusion Started  1251  -CG       Checked by (Patient RN)  --       Checked by (RN 2)  --       Broken Bag?  --       Immediate suspected  transfusion reaction to the product  --       Time Infusion Stopped  --       Total Flush Volume (mL)  --       Checked by (Witness)  --       Date Infusion Started  --       Date Infusion Stopped  --       Volume Infused (mL)  --       Total Volume Infused (cc)  --          Patient tolerance of product infusion    Immediate suspected transfusion reaction to the product  none  -CG       Did patient have prior history of similar signs/symptoms during this hospitalization?  NA  -CG       Symptoms during/after infusion  other (comment) none  -CG       Did the patient tolerate the infusion well  yes  -CG       Medications and treatment for symptoms  none  -CG       Did the symptoms resolve?  yes  -CG       Enter comments if clots, leaks, broken bag, infusion delays, other issues with bag/infusion  --       Describe symptoms  --         User Key  (r) = Recorded By, (t) = Taken By, (c) = Cosigned By    Initials Name Effective Dates    CG Melanie Berumen RN 01/16/18 -     Maddison Garg 01/12/21 -             Post-Infusion Evaluation:   Infusion Related Reaction: Grade 0 - none  Dyspnea: Grade 0 - none  Hypoxia: Grade 0 - not present  Fever: Grade 0 - afebrile  Chills: Grade 0 - none  Febrile Neutropenia: Grade 0 - not present  Sinus Bradycardia: Grade 0 - none  Hypertension: Grade 0 - none  Hypotension: Grade 0 - none  Chest Pain: Grade 0 - none  Bronchospasm: Grade 0 - none  Pain: Grade 0 - none  Rash: Grade 0 - None  Neurologic Specify: none    If this was a cord blood transplant, was more than one cord blood unit infused? no    Jacqueline Shin NP

## 2021-02-16 NOTE — PLAN OF CARE
Afebrile this shift, vitals are WNL.  Lungs are clear.  Pt denies pain but continues to report nausea. Emesis episode x1.  Scheduled ativan given a little early, and prn benadryl given x1.  Hourly rounding completed.

## 2021-02-16 NOTE — PROGRESS NOTES
Pediatric BMT Daily Progress Note    Interval Events: UCBT due today- flush infusing. Afebrile and clinically stable. Continues with nausea and intermittent emesis.     Review of Systems: Pertinent positives include those mentioned in interval events. A complete review of systems was performed and is otherwise negative.      Medications:  Please see MAR    Physical Exam:    Temp:  [98.3  F (36.8  C)-99.1  F (37.3  C)] 98.6  F (37  C)  Pulse:  [56-86] 66  Resp:  [16-20] 16  BP: ()/(60-86) 107/71  SpO2:  [98 %-100 %] 98 %     I/O last 3 completed shifts:  In: 2713.25 [P.O.:240; I.V.:2473.25]  Out: 2613 [Urine:1863; Emesis/NG output:750]    GEN: Interactive, pleasant and cooperative, sitting in bed. NAD.    HEENT: Head NC/AT, PERRL, EOMs intact, sclerae clear, nares patent, MMM.  CARD: RRR, normal S1/S2 without murmur.  RESP: Lungs clear to auscultation bilaterally. Normal work of breathing. No adventitious lung sounds.  ABD: Soft, NT, ND, NABS present, no organomegaly or masses noted.   EXTREM: WWP, MAEE  SKIN: Dry skin; Intact without erythema or rash on exposed skin surfaces  NEURO: No focal deficits  ACCESS: Double lumen burns, port (not accessed)    Labs: All reviewed with pertinent positives: Hgb 11.3, Plts 31k; BUN 8, Creat 0.64, phos 2.9    Assessment and Plan: Artemio is a 21 yo male with history of multiply relapsed ALL undergoing conditioning/UCBT per 2013-09. Day 0 BMT today. Continues with nausea/vomitting and emerging mucositis.    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 No evidence of leukemia on day +21 procedures.  - Preparative regimen per [id number] with Thiotepa (days -7 trough -6), Fludarabine (days -5 through -3), Busulfan (days -5 through -3), ATG (days -4 through  -2), Rest day (day -1), UCBT (2/16)-- flush infusing, premeds ordered.   - Disease (BMBx) and donor evaluations due day +21     # Risk for GVHD:   - Continue Tacrolimus, goal 10-15 through day +14. Level 15.2 yesterday, dose decreased with repeat level pending from today.  - Continue MMF-- levels due Sunday.    FEN/Renal:  # Risk for malnutrition: intake minimal/not tolerated with associated weight loss and electrolyte imbalance as below.  - Regular diet as tolerated  - Dietician to follow  - Start TPN this evening    # Electrolyte abnormalities: K 3.2, Calcium 8.1, magnesium 1.3, total protein 5.6 today  - Check daily electrolytes  - Start TPN as above  - hypophosphatemia: resolved     # Risk for renal dysfunction and fluid overload: GFR (1/28): 119 mls/min  - Monitor I/O's and daily weights  - Maintenance IV fluids due to decreased fluid intake     # Risk for aHUS/TA-TMA:    - Monitor LDH qMon/Thurs: 138 (2/15)  - Monitor urine protein/creatinine qTuesday     Pulmonary:  # Risk for pulmonary insufficiency: No current concerns    Cardiovascular:  # Asymptomatic WPW syndrome (diagnosed 2018): no interventions to date. Most recent EKG (1/27). ECHO (1/26) normal, EF 64%. Cardiology following.   - Obtain weekly BNP (uptrended to 664 today) and troponin (<0.015 today)  - Follow up ECHO in 6 mos (7/2021)  - Follow up 24h Zio patch holter (1/27-1/28)-- cardiology called 2/15-- verbal report of no PVCs or PACs but official read pending     # Risk for hypertension secondary to medications: Closely monitor following admission.     Heme:   # Pancytopenia secondary to chemotherapy  - Transfuse for hemoglobin < 7, platelets < 10,000  - Hx of transfusion reactions (hives): Premedicate with tylenol and benadryl prior to pRBCs and plts  - GCSF 5 mcg/kg IV daily to begin day +5 per protocol and continue until ANC >/= 2500 x 3 consecutive days.     Infectious Disease:  # Fever in context of BMT conditioning and central access  (2/13):  - Follow Blood cxs (NGTD). Of note, also has un-accessed port-a-cath.   - Continue empiric Cefepime through count recovery    # Risk for infection given immunocompromised status with need for prophylaxis:  - Viral (CMV/HSV sero positive): HD Acyclovir. Weekly CMV neg 2/10.  - Fungal: Micafungin. Transition to either itraconazole or posaconazole when able  - Bacterial: Cefepime as above  - PCP: Pentamidine given 2/15. Consider Bactrim next month if WBC criteria met (no hx of adverse reaction).     Past infections: C. Diff (November 2020)     GI:   # Reflux:  - BID protonix   - PRN Tums     # Nausea management:   - Zofran gtt; ativan q6h. Start 3d course of emend (2/16-2/18)  - Benadryl prn     # Risk for VOD  - Ursodiol TID      Neuro:  # Mucositis:    - tylenol PRN   - morphine PRN     # Seizure prophylaxis: s/p Keppra     # History of significant spinal headaches: consider using Candelario needle with lumbar punctures    # CRS history. 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 No CRS, neurotoxicity or infectious complications from [place] CAR-T.     Fertility: Sperm collected previously collected for fertility preservation     Discharge Considerations: Expected lengths of hospitalization for patients undergoing stem cell transplantation vary by primary diagnosis, conditioning regimen, graft source, and development of complications. A typical stay is 6 weeks.    The above plan of care was developed by and communicated to me by the   Pediatric BMT attending physician, Viky Zavala MD.     BENJA Rose-[facility] Blood and Marrow Transplant  [facility]  [address] [place] [id number]  [phone]  [phone]    Pediatric BMT Attending Inpatient Note:  Artemio was seen and evaluated by me today.      The significant interval history still  with emesis.  Transplant today.      I have reviewed changes and data from the last 24 hours, including medications, laboratory results, vital signs and pathology results.      I have formulated and discussed the plan with the BMT team. The relevant clinical topics addressed included the followin19 y/o M w/ h/o multiply relapsed Pre-B cell ALL (s/p MSD BMT, CART-19, CART-22) in CR3 here for consolidation of remission with MAC (FluBuTT ATG) S/P UCBT, risk for malnutrition- will start TPN, risk for nausea-zofran drip and ativan Q6, risk for VOD, risk for gastritis, risk for opportunistic infection on micafungin, valtrex and pentamidine and cefepime, risk of GVHD- on tacro and MMF, risk of mucositis- morphine PRN, WPW, monitoring pro-BNP and troponins- will discuss with cards, recent Ziopatch per cards had no PACs or PVCs.  Transplant today.  I was present for the infusion,       I discussed the course and plan with the patient/family and answered all of their questions to the best of my ability.     My care coordination activities today include oversight of planned lab studies, oversight of medication changes and discussion with BMT team-members.      My total floor time today was at least 40 minutes, greater than 50% of which was counseling and coordination of care.    Viky Zavala MD, PhD    Pediatric Blood and Marrow Transplant  [facility]        BMT Graft/Cell Infusion Note    Assessment:   Type: Allogeneic  Diagnosis: ALL  Indication for Infusion: reconstitution of hematopoiesis (transplant graft)  Reviewed and Confirmed for the Infusion: consent previously obtained, was present for the start of the infusion and was available in the facility during the infusion  Donor: UCB  Product Characteristics, Cell Dose and Volume: as described on the infusion form ([id number]).  Patient was Premedicated as Ordered: Yes  Complications: none          Patient  Active Problem List   Diagnosis     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)     Aaron-Parkinson-White (WPW) syndrome     Bone marrow transplant candidate     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)     At risk for infection     S/P allogeneic bone marrow transplant (H)     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     Examination of participant or control in clinical research

## 2021-02-16 NOTE — PROGRESS NOTES
CLINICAL NUTRITION SERVICES - BRIEF NOTE    Please refer to RD note from  for full assessment.     New Findings  New consult for Pharmacy/Nutrition to start TPN.     Pt currently receiving 110 ml/hr of D5% which provides 132 gm Dex (1.4 GIR) and 449 kcal. See recommendations below for TPN/IL.     Recommendations  Recommend PN based off of DW 64.5kg of 1296mLs, Dextrose 236 g, GIR 2.5, 97 g Amino Acids, 1.5 g/kg Amino Acids, 240 mL lipids, 0.8 g/kg for 1670 kcals, (26 kcal/kg). PN will meet 100% of kcal needs and 100% of protein needs.    Tita Guillen RDN, LD  [phone]

## 2021-02-16 NOTE — PROGRESS NOTES
Pt has been afebrile, AVS stable and within parameter. Lung sounds clear. No complaints of pain. Nausea intermittently this am. Scheduled medications providing relief; did give Emend this am as well. Transplant pre-medications given at 1230. Tolerated cord blood transplant very well. No issues. No nausea, no BP issues. Tacro decreased. Adequate UOP today. Stool x1, small amount per pt (he flushed it; reminded him to keep in hat so we can assess). Pt in good spirits today. Resting comfortably throughout the day. No further issues.

## 2021-02-17 LAB
ABO + RH BLD: NORMAL
ABO + RH BLD: NORMAL
ALBUMIN SERPL-MCNC: 2.7 G/DL (ref 3.4–5)
ALP SERPL-CCNC: 34 U/L (ref 40–150)
ALT SERPL W P-5'-P-CCNC: 31 U/L (ref 0–70)
ANION GAP SERPL CALCULATED.3IONS-SCNC: 6 MMOL/L (ref 3–14)
AST SERPL W P-5'-P-CCNC: 26 U/L (ref 0–45)
BILIRUB DIRECT SERPL-MCNC: 0.2 MG/DL (ref 0–0.2)
BILIRUB SERPL-MCNC: 0.5 MG/DL (ref 0.2–1.3)
BLD GP AB SCN SERPL QL: NORMAL
BLOOD BANK CMNT PATIENT-IMP: NORMAL
BUN SERPL-MCNC: 9 MG/DL (ref 7–30)
C DIFF TOX B STL QL: POSITIVE
CALCIUM SERPL-MCNC: 8.1 MG/DL (ref 8.5–10.1)
CHLORIDE SERPL-SCNC: 107 MMOL/L (ref 94–109)
CO2 SERPL-SCNC: 26 MMOL/L (ref 20–32)
CREAT SERPL-MCNC: 0.58 MG/DL (ref 0.66–1.25)
DIFFERENTIAL METHOD BLD: ABNORMAL
ERYTHROCYTE [DISTWIDTH] IN BLOOD BY AUTOMATED COUNT: 11.9 % (ref 10–15)
GFR SERPL CREATININE-BSD FRML MDRD: >90 ML/MIN/{1.73_M2}
GLUCOSE SERPL-MCNC: 138 MG/DL (ref 70–99)
HCT VFR BLD AUTO: 32.7 % (ref 40–53)
HGB BLD-MCNC: 11.4 G/DL (ref 13.3–17.7)
INR PPP: 0.99 (ref 0.86–1.14)
LAB SCANNED RESULT: NORMAL
MAGNESIUM SERPL-MCNC: 1.3 MG/DL (ref 1.6–2.3)
MAGNESIUM SERPL-MCNC: 2.1 MG/DL (ref 1.6–2.3)
MCH RBC QN AUTO: 32.1 PG (ref 26.5–33)
MCHC RBC AUTO-ENTMCNC: 34.9 G/DL (ref 31.5–36.5)
MCV RBC AUTO: 92 FL (ref 78–100)
NMDP REPOSITORY: NORMAL
PHOSPHATE SERPL-MCNC: 2.7 MG/DL (ref 2.5–4.5)
PLATELET # BLD AUTO: 30 10E9/L (ref 150–450)
POTASSIUM SERPL-SCNC: 3.2 MMOL/L (ref 3.4–5.3)
PREALB SERPL IA-MCNC: 26 MG/DL (ref 15–45)
PROT SERPL-MCNC: 5.6 G/DL (ref 6.8–8.8)
RBC # BLD AUTO: 3.55 10E12/L (ref 4.4–5.9)
SODIUM SERPL-SCNC: 139 MMOL/L (ref 133–144)
SPECIMEN EXP DATE BLD: NORMAL
SPECIMEN SOURCE: ABNORMAL
TACROLIMUS BLD-MCNC: 13.7 UG/L (ref 5–15)
TME LAST DOSE: NORMAL H
TRIGL SERPL-MCNC: 211 MG/DL
WBC # BLD AUTO: 0 10E9/L (ref 4–11)

## 2021-02-17 PROCEDURE — 258N000003 HC RX IP 258 OP 636: Performed by: PEDIATRICS

## 2021-02-17 PROCEDURE — 258N000003 HC RX IP 258 OP 636: Performed by: NURSE PRACTITIONER

## 2021-02-17 PROCEDURE — 86900 BLOOD TYPING SEROLOGIC ABO: CPT | Performed by: PEDIATRICS

## 2021-02-17 PROCEDURE — 87081 CULTURE SCREEN ONLY: CPT | Performed by: PEDIATRICS

## 2021-02-17 PROCEDURE — 85027 COMPLETE CBC AUTOMATED: CPT

## 2021-02-17 PROCEDURE — 85025 COMPLETE CBC W/AUTO DIFF WBC: CPT | Performed by: PEDIATRICS

## 2021-02-17 PROCEDURE — 86850 RBC ANTIBODY SCREEN: CPT | Performed by: PEDIATRICS

## 2021-02-17 PROCEDURE — 80053 COMPREHEN METABOLIC PANEL: CPT | Performed by: PEDIATRICS

## 2021-02-17 PROCEDURE — 250N000011 HC RX IP 250 OP 636: Performed by: PEDIATRICS

## 2021-02-17 PROCEDURE — 85610 PROTHROMBIN TIME: CPT | Performed by: PEDIATRICS

## 2021-02-17 PROCEDURE — 87106 FUNGI IDENTIFICATION YEAST: CPT | Performed by: PEDIATRICS

## 2021-02-17 PROCEDURE — 250N000013 HC RX MED GY IP 250 OP 250 PS 637: Performed by: PEDIATRICS

## 2021-02-17 PROCEDURE — 84478 ASSAY OF TRIGLYCERIDES: CPT | Performed by: PEDIATRICS

## 2021-02-17 PROCEDURE — 86901 BLOOD TYPING SEROLOGIC RH(D): CPT | Performed by: PEDIATRICS

## 2021-02-17 PROCEDURE — 80197 ASSAY OF TACROLIMUS: CPT | Performed by: NURSE PRACTITIONER

## 2021-02-17 PROCEDURE — 250N000011 HC RX IP 250 OP 636: Performed by: NURSE PRACTITIONER

## 2021-02-17 PROCEDURE — 87102 FUNGUS ISOLATION CULTURE: CPT | Performed by: PEDIATRICS

## 2021-02-17 PROCEDURE — 250N000013 HC RX MED GY IP 250 OP 250 PS 637: Performed by: NURSE PRACTITIONER

## 2021-02-17 PROCEDURE — 250N000009 HC RX 250: Performed by: NURSE PRACTITIONER

## 2021-02-17 PROCEDURE — 250N000009 HC RX 250: Performed by: PEDIATRICS

## 2021-02-17 PROCEDURE — 99233 SBSQ HOSP IP/OBS HIGH 50: CPT | Performed by: PEDIATRICS

## 2021-02-17 PROCEDURE — 87493 C DIFF AMPLIFIED PROBE: CPT | Performed by: STUDENT IN AN ORGANIZED HEALTH CARE EDUCATION/TRAINING PROGRAM

## 2021-02-17 PROCEDURE — 83735 ASSAY OF MAGNESIUM: CPT | Performed by: PEDIATRICS

## 2021-02-17 PROCEDURE — 84134 ASSAY OF PREALBUMIN: CPT | Performed by: PEDIATRICS

## 2021-02-17 PROCEDURE — 82248 BILIRUBIN DIRECT: CPT | Performed by: PEDIATRICS

## 2021-02-17 PROCEDURE — 206N000001 HC R&B BMT UMMC

## 2021-02-17 PROCEDURE — 84100 ASSAY OF PHOSPHORUS: CPT | Performed by: PEDIATRICS

## 2021-02-17 RX ORDER — VANCOMYCIN HYDROCHLORIDE 125 MG/1
125 CAPSULE ORAL 4 TIMES DAILY
Status: COMPLETED | OUTPATIENT
Start: 2021-02-17 | End: 2021-02-26

## 2021-02-17 RX ADMIN — PANTOPRAZOLE SODIUM 40 MG: 40 TABLET, DELAYED RELEASE ORAL at 20:10

## 2021-02-17 RX ADMIN — URSODIOL 600 MG: 300 CAPSULE ORAL at 08:30

## 2021-02-17 RX ADMIN — LORAZEPAM 1 MG: 2 INJECTION INTRAMUSCULAR; INTRAVENOUS at 04:10

## 2021-02-17 RX ADMIN — VANCOMYCIN HYDROCHLORIDE 125 MG: 125 CAPSULE ORAL at 16:21

## 2021-02-17 RX ADMIN — PANTOPRAZOLE SODIUM 40 MG: 40 TABLET, DELAYED RELEASE ORAL at 08:31

## 2021-02-17 RX ADMIN — LORAZEPAM 1 MG: 2 INJECTION INTRAMUSCULAR; INTRAVENOUS at 09:48

## 2021-02-17 RX ADMIN — CEFEPIME 2 G: 2 INJECTION, POWDER, FOR SOLUTION INTRAVENOUS at 13:21

## 2021-02-17 RX ADMIN — ACYCLOVIR SODIUM 600 MG: 50 INJECTION, SOLUTION INTRAVENOUS at 00:20

## 2021-02-17 RX ADMIN — CEFEPIME 2 G: 2 INJECTION, POWDER, FOR SOLUTION INTRAVENOUS at 05:33

## 2021-02-17 RX ADMIN — LORAZEPAM 1 MG: 2 INJECTION INTRAMUSCULAR; INTRAVENOUS at 22:22

## 2021-02-17 RX ADMIN — POTASSIUM CHLORIDE: 2 INJECTION, SOLUTION, CONCENTRATE INTRAVENOUS at 20:12

## 2021-02-17 RX ADMIN — VANCOMYCIN HYDROCHLORIDE 125 MG: 125 CAPSULE ORAL at 11:29

## 2021-02-17 RX ADMIN — MORPHINE SULFATE 2 MG: 2 INJECTION, SOLUTION INTRAMUSCULAR; INTRAVENOUS at 15:30

## 2021-02-17 RX ADMIN — URSODIOL 600 MG: 300 CAPSULE ORAL at 13:21

## 2021-02-17 RX ADMIN — MAGNESIUM SULFATE HEPTAHYDRATE 3000 MG: 500 INJECTION, SOLUTION INTRAMUSCULAR; INTRAVENOUS at 04:10

## 2021-02-17 RX ADMIN — MICAFUNGIN SODIUM 150 MG: 50 INJECTION, POWDER, LYOPHILIZED, FOR SOLUTION INTRAVENOUS at 10:25

## 2021-02-17 RX ADMIN — DIPHENHYDRAMINE HYDROCHLORIDE 50 MG: 50 INJECTION, SOLUTION INTRAMUSCULAR; INTRAVENOUS at 23:36

## 2021-02-17 RX ADMIN — SALINE NASAL SPRAY 1 SPRAY: 1.5 SOLUTION NASAL at 20:25

## 2021-02-17 RX ADMIN — ACYCLOVIR SODIUM 600 MG: 50 INJECTION, SOLUTION INTRAVENOUS at 08:30

## 2021-02-17 RX ADMIN — MYCOPHENOLATE MOFETIL 960 MG: 500 INJECTION, POWDER, LYOPHILIZED, FOR SOLUTION INTRAVENOUS at 14:01

## 2021-02-17 RX ADMIN — CEFEPIME 2 G: 2 INJECTION, POWDER, FOR SOLUTION INTRAVENOUS at 20:23

## 2021-02-17 RX ADMIN — I.V. FAT EMULSION 240 ML: 20 EMULSION INTRAVENOUS at 20:12

## 2021-02-17 RX ADMIN — LORAZEPAM 1 MG: 2 INJECTION INTRAMUSCULAR; INTRAVENOUS at 16:21

## 2021-02-17 RX ADMIN — URSODIOL 600 MG: 300 CAPSULE ORAL at 20:10

## 2021-02-17 RX ADMIN — ACYCLOVIR SODIUM 600 MG: 50 INJECTION, SOLUTION INTRAVENOUS at 16:21

## 2021-02-17 RX ADMIN — VANCOMYCIN HYDROCHLORIDE 125 MG: 125 CAPSULE ORAL at 20:10

## 2021-02-17 RX ADMIN — MYCOPHENOLATE MOFETIL 960 MG: 500 INJECTION, POWDER, LYOPHILIZED, FOR SOLUTION INTRAVENOUS at 05:33

## 2021-02-17 RX ADMIN — MYCOPHENOLATE MOFETIL 960 MG: 500 INJECTION, POWDER, LYOPHILIZED, FOR SOLUTION INTRAVENOUS at 22:23

## 2021-02-17 RX ADMIN — DIPHENHYDRAMINE HYDROCHLORIDE 50 MG: 50 INJECTION, SOLUTION INTRAMUSCULAR; INTRAVENOUS at 02:03

## 2021-02-17 RX ADMIN — SODIUM CHLORIDE 81 MG: 9 INJECTION, SOLUTION INTRAVENOUS at 12:27

## 2021-02-17 ASSESSMENT — MIFFLIN-ST. JEOR: SCORE: 1598.37

## 2021-02-17 NOTE — PLAN OF CARE
[id number].  Afebrile, vitals WNL.  Lungs are clear.  Pt seemed to be sleeping btwn cares but reported waking up with diarrhea every few hours.  MD notified and CDiff sent to lab. Stool loose green mucous.  Pt placed in enteric precautions and educated regarding hand washing.  MgS given x1.  Hourly rounding completed.

## 2021-02-17 NOTE — PROGRESS NOTES
Pediatric BMT Daily Progress Note    Interval Events: UCBT tolerated well yesterday without any issues. Afebrile and clinically stable while WBC is at liana. He has had continuance of loose stools for which C.Diff was obtained and positive.     Review of Systems: Pertinent positives include those mentioned in interval events. A complete review of systems was performed and is otherwise negative.      Medications:  Please see MAR    Physical Exam:    Temp:  [98  F (36.7  C)-98.9  F (37.2  C)] 98.4  F (36.9  C)  Pulse:  [50-78] 71  Resp:  [16-22] 16  BP: ()/(66-84) 111/70  Cuff Mean (mmHg):  [71-86] 71  SpO2:  [97 %-100 %] 97 %     I/O last 3 completed shifts:  In: 4122.04 [I.V.:2781.04; Blood:103; Other:193]  Out: 3055 [Urine:2855; Stool:200]    GEN: Interactive, pleasant and cooperative, sitting in bed. NAD.    HEENT: Head NC/AT, PERRL, EOMs intact, sclerae clear, nares patent, MMM.  CARD: RRR, normal S1/S2 without murmur.  RESP: Lungs clear to auscultation bilaterally. Normal work of breathing. No adventitious lung sounds.  ABD: Soft, NT, ND, NABS present, no organomegaly or masses noted.   EXTREM: WWP, MAEE  SKIN: Dry skin; Intact without erythema or rash on exposed skin surfaces  NEURO: No focal deficits  ACCESS: Double lumen burns, port (not accessed)    Labs: All reviewed with pertinent positives: Hgb 11.4, Plts 30k; BUN 9, Creat 0.58, phos 2.7    Assessment and Plan: Artemio is a 19 yo male with history of multiply relapsed ALL undergoing conditioning/UCBT per 2013-09. Day +1 BMT today. Afebrile and clinically stable; C.Diff positive today.    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 No evidence of leukemia on day +21 procedures.  - Preparative regimen per [id number] with Thiotepa  (days -7 trough -6), Fludarabine (days -5 through -3), Busulfan (days -5 through -3), ATG (days -4 through -2), Rest day (day -1), UCBT (2/16). Counts at liana.  - Disease (BMBx) and donor evaluations due day +21     # Risk for GVHD:   - Continue Tacrolimus, goal 10-15 through day +14. Level 15.1 yesterday, dose decreased with repeat level pending from today.  - Continue MMF-- levels due Sunday.    FEN/Renal:  # Risk for malnutrition:   - Regular diet as tolerated  - Dietician to follow  - Continue TPN/IL    # Risk for electrolyte abnormalities:    - Check daily electrolytes   - hypophosphatemia: resolved     # Risk for renal dysfunction and fluid overload: GFR (1/28): 119 mls/min  - Monitor I/O's and daily weights  - Maintenance IV fluids due to decreased fluid intake     # Risk for aHUS/TA-TMA:    - Monitor LDH qMon/Thurs: 138 (2/15)  - Monitor urine protein/creatinine qTuesday     Pulmonary:  # Risk for pulmonary insufficiency: No current concerns    Cardiovascular:  # Asymptomatic WPW syndrome (diagnosed 2018): no interventions to date. Most recent EKG (1/27). ECHO (1/26) normal, EF 64%. 24h Zio patch holter (1/27-1/28) revealed c/w WPW with no ventricular ectopies present. Cardiology following.   - Obtain weekly BNP (uptrended to 664 2/16) and troponin (<0.015 2/16). Per cardiology, elevated BNP likely attributed to fluid shifts (threshold of concern >5000).  - Follow up ECHO in 6 mos (7/2021)     # Risk for hypertension secondary to medications: Closely monitor following admission.     Heme:   # Pancytopenia secondary to chemotherapy  - Transfuse for hemoglobin < 7, platelets < 10,000  - Hx of transfusion reactions (hives): Premedicate with tylenol and benadryl prior to pRBCs and plts  - GCSF 5 mcg/kg IV daily to begin day +5 per protocol and continue until ANC >/= 2500 x 3 consecutive days.     Infectious Disease:  # Fever in context of BMT conditioning and central access (2/13):  - Follow Blood cxs (NGTD).  Of note, also has un-accessed port-a-cath.   - Continue empiric Cefepime through count recovery    # C.Diff (2/17): initiate 10d course of oral vancomycin    # Risk for infection given immunocompromised status with need for prophylaxis:  - Viral (CMV/HSV sero positive): HD Acyclovir. Weekly CMV neg 2/10-- pending from today.  - Fungal: Micafungin. Transition to either itraconazole or posaconazole when able.  - Bacterial: Cefepime as above  - PCP: Pentamidine given 2/15. Consider Bactrim next month if WBC criteria met (no hx of adverse reaction).     Past infections: C. Diff (November 2020)     GI:   # Reflux:  - BID protonix   - PRN Tums     # Nausea management:   - Zofran gtt; ativan q6h. Continue 3d course of emend (2/16-2/18)  - Benadryl prn     # Risk for VOD  - Ursodiol TID     # Loose stools:  - see C.Diff above  - maintain hydration     Neuro:  # Mucositis:    - tylenol PRN   - morphine PRN     # Seizure prophylaxis: s/p Keppra     # History of significant spinal headaches: consider using Candelario needle with lumbar punctures    # CRS history. 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 No CRS, neurotoxicity or infectious complications from [place] CAR-T.     Fertility: Sperm collected previously collected for fertility preservation     Discharge Considerations: Expected lengths of hospitalization for patients undergoing stem cell transplantation vary by primary diagnosis, conditioning regimen, graft source, and development of complications. A typical stay is 6 weeks.    The above plan of care was developed by and communicated to me by the   Pediatric BMT attending physician, Viky Zavala MD.     BENJA Rose-[facility]  [facility] Blood and Marrow Transplant  [address] [address]  [phone]  [phone]          Pediatric BMT  Attending Inpatient Note:  Artemio was seen and evaluated by me today.      The significant interval history still with emesis.  Transplant today.      I have reviewed changes and data from the last 24 hours, including medications, laboratory results, vital signs and pathology results.      I have formulated and discussed the plan with the BMT team. The relevant clinical topics addressed included the followin19 y/o M w/ h/o multiply relapsed Pre-B cell ALL (s/p MSD BMT, CART-19, CART-22) in CR3 here for consolidation of remission with MAC (FluBuTT ATG) S/P UCBT, risk for malnutrition- will start TPN, risk for nausea-zofran drip and ativan Q6, risk for VOD, risk for gastritis, risk for opportunistic infection on micafungin, valtrex and pentamidine and cefepime, risk of GVHD- on tacro and MMF, risk of mucositis- morphine PRN, WPW, monitoring pro-BNP and troponins- will discuss with cards, recent Ziopatch per cards had no PACs or PVCs.  Diagnosed with C. Diff, will start oral vancomycin.      I discussed the course and plan with the patient/family and answered all of their questions to the best of my ability.     My care coordination activities today include oversight of planned lab studies, oversight of medication changes and discussion with BMT team-members.      My total floor time today was at least 40 minutes, greater than 50% of which was counseling and coordination of care.    Viky Zavala MD, PhD    Pediatric Blood and Marrow Transplant  [facility]                Patient Active Problem List   Diagnosis     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)     Aaron-Parkinson-White (WPW) syndrome     Bone marrow transplant candidate     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)     At risk for infection     S/P allogeneic bone marrow transplant (H)     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)     Fever     Neutropenic fever  (H)     Examination of participant or control in clinical research

## 2021-02-17 NOTE — PLAN OF CARE
Isael has been afebrile, HR's 50-60's, OVSS. Lungs clear on R/A. No complaints of pain or nausea. Scheduled medications providing relief. Good UOP, no stool. Resting comfortably all evening. Mom at bedside and attentive to patient. Will continue with POC and notify MD with updates.

## 2021-02-17 NOTE — PLAN OF CARE
Afebrile, vss, lungs clear.  Still having loose stool but not going into hat and flushing  after he uses toilet. Stated he didn't know we need to measure it. This RN told him that urine and stool needs to be assessed and measured each time. This RN mentioned it to him last week as well.  Commode with deep collection container  ordered as he said he does not like the stool splashing up from the hat.  Oral vanco started for positive C diff. Patient and mother informed.    Refusing mouth care. RN mentioned mouthcare expectations last week and again today.  Patient states mouth feels better. Drinking water. Ate some yogurt, cereal, milk and mac and cheese.   Mother here most of  shift.   Hourly rounding completed.  Continue with plan of care.

## 2021-02-18 LAB
ALBUMIN SERPL-MCNC: 3 G/DL (ref 3.4–5)
ALP SERPL-CCNC: 36 U/L (ref 40–150)
ALT SERPL W P-5'-P-CCNC: 37 U/L (ref 0–70)
ANION GAP SERPL CALCULATED.3IONS-SCNC: 6 MMOL/L (ref 3–14)
APTT PPP: 29 SEC (ref 22–37)
AST SERPL W P-5'-P-CCNC: 23 U/L (ref 0–45)
BILIRUB DIRECT SERPL-MCNC: 0.2 MG/DL (ref 0–0.2)
BILIRUB SERPL-MCNC: 0.6 MG/DL (ref 0.2–1.3)
BUN SERPL-MCNC: 11 MG/DL (ref 7–30)
CALCIUM SERPL-MCNC: 8.4 MG/DL (ref 8.5–10.1)
CHLORIDE SERPL-SCNC: 106 MMOL/L (ref 94–109)
CMV DNA SPEC NAA+PROBE-ACNC: NORMAL [IU]/ML
CMV DNA SPEC NAA+PROBE-LOG#: NORMAL {LOG_IU}/ML
CO2 SERPL-SCNC: 27 MMOL/L (ref 20–32)
CREAT SERPL-MCNC: 0.57 MG/DL (ref 0.66–1.25)
DIFFERENTIAL METHOD BLD: ABNORMAL
ERYTHROCYTE [DISTWIDTH] IN BLOOD BY AUTOMATED COUNT: 11.8 % (ref 10–15)
FIBRINOGEN PPP-MCNC: 499 MG/DL (ref 200–420)
GFR SERPL CREATININE-BSD FRML MDRD: >90 ML/MIN/{1.73_M2}
GLUCOSE SERPL-MCNC: 111 MG/DL (ref 70–99)
HCT VFR BLD AUTO: 33.3 % (ref 40–53)
HEMOCCULT STL QL: NEGATIVE
HGB BLD-MCNC: 11.8 G/DL (ref 13.3–17.7)
INR PPP: 0.96 (ref 0.86–1.14)
LAB SCANNED RESULT: NORMAL
LDH SERPL L TO P-CCNC: 146 U/L (ref 85–227)
MAGNESIUM SERPL-MCNC: 1.5 MG/DL (ref 1.6–2.3)
MAGNESIUM SERPL-MCNC: 2.3 MG/DL (ref 1.6–2.3)
MCH RBC QN AUTO: 31.7 PG (ref 26.5–33)
MCHC RBC AUTO-ENTMCNC: 35.4 G/DL (ref 31.5–36.5)
MCV RBC AUTO: 90 FL (ref 78–100)
PHOSPHATE SERPL-MCNC: 3 MG/DL (ref 2.5–4.5)
PLATELET # BLD AUTO: 21 10E9/L (ref 150–450)
POTASSIUM SERPL-SCNC: 3.5 MMOL/L (ref 3.4–5.3)
PROT SERPL-MCNC: 5.9 G/DL (ref 6.8–8.8)
RBC # BLD AUTO: 3.72 10E12/L (ref 4.4–5.9)
SODIUM SERPL-SCNC: 139 MMOL/L (ref 133–144)
SPECIMEN SOURCE: NORMAL
TACROLIMUS BLD-MCNC: 12.7 UG/L (ref 5–15)
TME LAST DOSE: NORMAL H
WBC # BLD AUTO: 0 10E9/L (ref 4–11)

## 2021-02-18 PROCEDURE — 85610 PROTHROMBIN TIME: CPT | Performed by: PEDIATRICS

## 2021-02-18 PROCEDURE — 258N000003 HC RX IP 258 OP 636: Performed by: NURSE PRACTITIONER

## 2021-02-18 PROCEDURE — 250N000011 HC RX IP 250 OP 636: Performed by: NURSE PRACTITIONER

## 2021-02-18 PROCEDURE — 83615 LACTATE (LD) (LDH) ENZYME: CPT | Performed by: NURSE PRACTITIONER

## 2021-02-18 PROCEDURE — 80197 ASSAY OF TACROLIMUS: CPT | Performed by: NURSE PRACTITIONER

## 2021-02-18 PROCEDURE — 83735 ASSAY OF MAGNESIUM: CPT | Performed by: PEDIATRICS

## 2021-02-18 PROCEDURE — 84100 ASSAY OF PHOSPHORUS: CPT | Performed by: NURSE PRACTITIONER

## 2021-02-18 PROCEDURE — 82272 OCCULT BLD FECES 1-3 TESTS: CPT | Performed by: NURSE PRACTITIONER

## 2021-02-18 PROCEDURE — 85384 FIBRINOGEN ACTIVITY: CPT | Performed by: PEDIATRICS

## 2021-02-18 PROCEDURE — 206N000001 HC R&B BMT UMMC

## 2021-02-18 PROCEDURE — 82248 BILIRUBIN DIRECT: CPT | Performed by: NURSE PRACTITIONER

## 2021-02-18 PROCEDURE — 85730 THROMBOPLASTIN TIME PARTIAL: CPT | Performed by: PEDIATRICS

## 2021-02-18 PROCEDURE — 85027 COMPLETE CBC AUTOMATED: CPT

## 2021-02-18 PROCEDURE — 250N000013 HC RX MED GY IP 250 OP 250 PS 637: Performed by: NURSE PRACTITIONER

## 2021-02-18 PROCEDURE — 250N000011 HC RX IP 250 OP 636: Performed by: PEDIATRICS

## 2021-02-18 PROCEDURE — 80053 COMPREHEN METABOLIC PANEL: CPT | Performed by: NURSE PRACTITIONER

## 2021-02-18 PROCEDURE — 99233 SBSQ HOSP IP/OBS HIGH 50: CPT | Performed by: PEDIATRICS

## 2021-02-18 PROCEDURE — 83735 ASSAY OF MAGNESIUM: CPT | Performed by: NURSE PRACTITIONER

## 2021-02-18 PROCEDURE — 250N000009 HC RX 250: Performed by: PEDIATRICS

## 2021-02-18 PROCEDURE — 258N000003 HC RX IP 258 OP 636: Performed by: PEDIATRICS

## 2021-02-18 PROCEDURE — 250N000013 HC RX MED GY IP 250 OP 250 PS 637: Performed by: PEDIATRICS

## 2021-02-18 PROCEDURE — 250N000009 HC RX 250: Performed by: NURSE PRACTITIONER

## 2021-02-18 RX ADMIN — VANCOMYCIN HYDROCHLORIDE 125 MG: 125 CAPSULE ORAL at 20:07

## 2021-02-18 RX ADMIN — MYCOPHENOLATE MOFETIL 960 MG: 500 INJECTION, POWDER, LYOPHILIZED, FOR SOLUTION INTRAVENOUS at 22:46

## 2021-02-18 RX ADMIN — I.V. FAT EMULSION 240 ML: 20 EMULSION INTRAVENOUS at 19:54

## 2021-02-18 RX ADMIN — CEFEPIME 2 G: 2 INJECTION, POWDER, FOR SOLUTION INTRAVENOUS at 21:38

## 2021-02-18 RX ADMIN — MAGNESIUM SULFATE HEPTAHYDRATE 3000 MG: 500 INJECTION, SOLUTION INTRAMUSCULAR; INTRAVENOUS at 05:05

## 2021-02-18 RX ADMIN — URSODIOL 600 MG: 300 CAPSULE ORAL at 20:08

## 2021-02-18 RX ADMIN — LORAZEPAM 1 MG: 2 INJECTION INTRAMUSCULAR; INTRAVENOUS at 22:44

## 2021-02-18 RX ADMIN — ACYCLOVIR SODIUM 600 MG: 50 INJECTION, SOLUTION INTRAVENOUS at 16:40

## 2021-02-18 RX ADMIN — URSODIOL 600 MG: 300 CAPSULE ORAL at 09:04

## 2021-02-18 RX ADMIN — URSODIOL 600 MG: 300 CAPSULE ORAL at 13:26

## 2021-02-18 RX ADMIN — VANCOMYCIN HYDROCHLORIDE 125 MG: 125 CAPSULE ORAL at 16:43

## 2021-02-18 RX ADMIN — VANCOMYCIN HYDROCHLORIDE 125 MG: 125 CAPSULE ORAL at 09:04

## 2021-02-18 RX ADMIN — SODIUM CHLORIDE 81 MG: 9 INJECTION, SOLUTION INTRAVENOUS at 12:28

## 2021-02-18 RX ADMIN — CEFEPIME 2 G: 2 INJECTION, POWDER, FOR SOLUTION INTRAVENOUS at 04:17

## 2021-02-18 RX ADMIN — MYCOPHENOLATE MOFETIL 960 MG: 500 INJECTION, POWDER, LYOPHILIZED, FOR SOLUTION INTRAVENOUS at 14:09

## 2021-02-18 RX ADMIN — PANTOPRAZOLE SODIUM 40 MG: 40 TABLET, DELAYED RELEASE ORAL at 20:09

## 2021-02-18 RX ADMIN — DEXTROSE MONOHYDRATE 0.01 MG/KG/DAY: 5 INJECTION, SOLUTION INTRAVENOUS at 20:04

## 2021-02-18 RX ADMIN — CEFEPIME 2 G: 2 INJECTION, POWDER, FOR SOLUTION INTRAVENOUS at 13:20

## 2021-02-18 RX ADMIN — ACYCLOVIR SODIUM 600 MG: 50 INJECTION, SOLUTION INTRAVENOUS at 09:03

## 2021-02-18 RX ADMIN — LORAZEPAM 1 MG: 2 INJECTION INTRAMUSCULAR; INTRAVENOUS at 04:17

## 2021-02-18 RX ADMIN — LORAZEPAM 1 MG: 2 INJECTION INTRAMUSCULAR; INTRAVENOUS at 16:37

## 2021-02-18 RX ADMIN — POTASSIUM CHLORIDE: 2 INJECTION, SOLUTION, CONCENTRATE INTRAVENOUS at 19:53

## 2021-02-18 RX ADMIN — LORAZEPAM 1 MG: 2 INJECTION INTRAMUSCULAR; INTRAVENOUS at 10:04

## 2021-02-18 RX ADMIN — ACYCLOVIR SODIUM 600 MG: 50 INJECTION, SOLUTION INTRAVENOUS at 00:31

## 2021-02-18 RX ADMIN — MYCOPHENOLATE MOFETIL 960 MG: 500 INJECTION, POWDER, LYOPHILIZED, FOR SOLUTION INTRAVENOUS at 06:20

## 2021-02-18 RX ADMIN — PANTOPRAZOLE SODIUM 40 MG: 40 TABLET, DELAYED RELEASE ORAL at 09:04

## 2021-02-18 RX ADMIN — VANCOMYCIN HYDROCHLORIDE 125 MG: 125 CAPSULE ORAL at 13:25

## 2021-02-18 RX ADMIN — MORPHINE SULFATE 2 MG: 2 INJECTION, SOLUTION INTRAMUSCULAR; INTRAVENOUS at 13:20

## 2021-02-18 RX ADMIN — MICAFUNGIN SODIUM 150 MG: 50 INJECTION, POWDER, LYOPHILIZED, FOR SOLUTION INTRAVENOUS at 10:47

## 2021-02-18 ASSESSMENT — MIFFLIN-ST. JEOR: SCORE: 1605.37

## 2021-02-18 NOTE — PROGRESS NOTES
Pediatric BMT Daily Progress Note    Interval Events: Isael remains afebrile and clinically stable while counts are at liana (day +2). Morphine taken x1 for abdominal pain yesterday. Continues with loose stools although he is flushing output so volume unknown.     Review of Systems: Pertinent positives include those mentioned in interval events. A complete review of systems was performed and is otherwise negative.      Medications:  Please see MAR    Physical Exam:    Temp:  [98  F (36.7  C)-99.1  F (37.3  C)] 98.5  F (36.9  C)  Pulse:  [66-97] 84  Resp:  [16-18] 16  BP: ()/(63-79) 96/63  SpO2:  [96 %-99 %] 98 %     I/O last 3 completed shifts:  In: 3979.02 [P.O.:465; I.V.:1699.02]  Out: 3195 [Urine:3175; Stool:20]    GEN: Interactive, pleasant and cooperative, sitting in bed. NAD.    HEENT: Head NC/AT, PERRL, EOMs intact, sclerae clear, nares patent, MMM. Erythema and ridging of lateral tongue and interior cheeks without visible sores.  CARD: RRR, normal S1/S2 without murmur.  RESP: Lungs clear to auscultation bilaterally. Normal work of breathing. No adventitious lung sounds.  ABD: Soft, NT, ND, NABS present, no organomegaly or masses noted.   EXTREM: WWP, MAEE  SKIN: Dry skin; Intact without erythema or rash on exposed skin surfaces  NEURO: No focal deficits  ACCESS: Double lumen burns, port (not accessed)    Labs: All reviewed with pertinent positives: Hgb 11.8, Plts 21k; BUN 11, Creat 0.57    Assessment and Plan: Artemio is a 19 yo male with history of multiply relapsed ALL undergoing conditioning/UCBT per 2013-09. Day +2, afebrile and clinically stable. C.Diff positive 2/17 for which he is on oral vancomycin.     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 S/p preparative regimen per [id number] with Thiotepa (days -7 trough -6), Fludarabine (days -5 through -3), Busulfan (days -5 through -3), ATG (days -4 through -2) followed by UCBT (2/16). Counts at liana.  - Disease (BMBx) and donor evaluations due day +21     # Risk for GVHD:   - Continue Tacrolimus, goal 10-15 through day +14. Level 13.7 yesterday, dose decreased (due to interaction with emend) with repeat level pending from today.   - Continue MMF-- levels due Sunday.    FEN/Renal:  # Risk for malnutrition: intake minimal  - Regular diet as tolerated  - Dietician to follow  - Continue TPN/IL    # Electrolyte abnormalities:    - Check daily electrolytes   - Replace/adjust in TPN as indicated     # Risk for renal dysfunction and fluid overload: GFR (1/28): 119 mls/min  - Monitor I/O's and daily weights     # Risk for aHUS/TA-TMA:    - Monitor LDH qMon/Thurs: 146 (2/18)  - Monitor urine protein/creatinine qTuesday     Pulmonary:  # Risk for pulmonary insufficiency: No current concerns    Cardiovascular:  # Asymptomatic WPW syndrome (diagnosed 2018): no interventions to date. Most recent EKG (1/27). ECHO (1/26) normal, EF 64%. 24h Zio patch holter (1/27-1/28) revealed c/w WPW with no ventricular ectopies present. Cardiology following.   - Obtain weekly BNP (uptrended to 664 2/16) and troponin (<0.015 2/16). Per cardiology, elevated BNP likely attributed to fluid shifts (threshold of concern >5000). Next due 2/23.  - Follow up ECHO in 6 mos (7/2021)     # Risk for hypertension secondary to medications: Closely monitor following admission.     Heme:   # Pancytopenia secondary to chemotherapy  - Transfuse for hemoglobin < 7, platelets < 10,000  - Hx of transfusion reactions (hives): Premedicate with tylenol and benadryl prior to pRBCs and plts  - GCSF 5 mcg/kg IV daily to begin day +5 per protocol and continue until ANC >/= 2500 x 3 consecutive days.     Infectious Disease:  # Fever in context of BMT  conditioning and central access (2/13):  - Follow Blood cxs (NGTD). Of note, also has un-accessed port-a-cath.   - Continue empiric Cefepime through count recovery    # C.Diff (2/17): continue 10d course of oral vancomycin through 2/26    # Risk for infection given immunocompromised status with need for prophylaxis:  - Viral (CMV/HSV sero positive): HD Acyclovir. Weekly CMV neg 2/17.  - Fungal: Micafungin. Transition to either itraconazole or posaconazole when able.  - Bacterial: Cefepime as above  - PCP: Pentamidine given 2/15. Consider Bactrim next month if WBC criteria met (no hx of adverse reaction).     Past infections: C. Diff (November 2020)     GI:   # Reflux:  - BID protonix   - PRN Tums     # Nausea management:   - Zofran gtt; ativan q6h. Continue 3d course of emend (2/16-2/18).   - Benadryl prn     # Risk for VOD  - Ursodiol TID     # Loose stools:  - see C.Diff above  - maintain hydration     Neuro:  # Mucositis/abdominal pain:    - tylenol PRN   - morphine PRN  - encourage warm packs     # Seizure prophylaxis: s/p Keppra     # History of significant spinal headaches: consider using Candelario needle with lumbar punctures    # CRS history. 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 No CRS, neurotoxicity or infectious complications from [place] CAR-T.     Fertility: Sperm collected previously collected for fertility preservation     Discharge Considerations: Expected lengths of hospitalization for patients undergoing stem cell transplantation vary by primary diagnosis, conditioning regimen, graft source, and development of complications. A typical stay is 6 weeks.    The above plan of care was developed by and communicated to me by the   Pediatric BMT attending physician, Viky Zavala MD.     [facility] Blood and Marrow Transplant  [facility]  [address]  [place] [address]  [phone]  [phone]    Pediatric BMT Attending Inpatient Note:  Artemio was seen and evaluated by me today.      The significant interval history diarrhea is slightly better after initiating vancomycin.  Had some abdominal pain and received morphine.     I have reviewed changes and data from the last 24 hours, including medications, laboratory results, vital signs and pathology results.      I have formulated and discussed the plan with the BMT team. The relevant clinical topics addressed included the followin19 y/o M w/ h/o multiply relapsed Pre-B cell ALL (s/p MSD BMT, CART-19, CART-22) in CR3 here for consolidation of remission with MAC (FluBuTT ATG) S/P UCBT, risk for malnutrition- will start TPN, risk for nausea-finishing course of emend today, risk for VOD, risk for gastritis, risk for opportunistic infection on micafungin, valtrex and pentamidine and cefepime, now with C. Diff infection- on vancomycin, at risk of GVHD- on tacro and MMF, risk of mucositis- morphine PRN, WPW, monitoring pro-BNP and troponins, recent Ziopatch per cards had no PACs or PVCs.        I discussed the course and plan with the patient/family and answered all of their questions to the best of my ability.     My care coordination activities today include oversight of planned lab studies, oversight of medication changes and discussion with BMT team-members.      My total floor time today was at least 40 minutes, greater than 50% of which was counseling and coordination of care.    Viky Zavala MD, PhD    Pediatric Blood and Marrow Transplant  [facility]            Patient Active Problem List   Diagnosis     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)     Aaron-Parkinson-White (WPW) syndrome     Bone marrow transplant candidate     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)     At risk for infection      S/P allogeneic bone marrow transplant (H)     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     Examination of participant or control in clinical research

## 2021-02-18 NOTE — PLAN OF CARE
Artemio has been afebrile, VSS, lung sounds clear on RA. No complaints of nausea. Received PRN morphine X1 for abdominal pain rated 6/10. Pt reported no pain after PRN dose was given. Good UOP, no stool. Mom at bedside. Will continue with POC and notify MD with updates.

## 2021-02-18 NOTE — PLAN OF CARE
"Afebrile, vss, lungs clear. Sats high 90s on room air.   Denies  nausea. Last dose of EMEND today. On scheduled ativan.   Eating some cereal and milk, drinking water.   Continues to flush toilet after having stool - uncertain as to volume/consistency of stool. BMT Mds aware. Patient did use it this afternoon, stool was liquid with some loose stool. The stool was dark and the liquid appeared dark red. NP Jacqueline Shin informed and stool sent for occult blood. Patient did state he was eating flaming hot red conrad chips when asked if he had eaten anything red. He said there was no blood on the toilet paper.  Patient asking for med for \"stomach \" pain soon after this. Morphine given x1.   Denies oral pain.     Noted to have blood tinged kleenex this morning.  He stated he did not have a nosebleed, but the blood tinged secretions were after blowing his nose. Refused saline drops as he states his nose is not dry.   Mom here this morning. Dad here this afternoon.   Hourly rounding completed. Continue with plan of care.   "

## 2021-02-18 NOTE — PROGRESS NOTES
"  Peds BMT Integrative Therapy Progress Note      Artemio Nino is a 20 year old male history of Acute lymphoblastic leukemia (ALL). Isael is receiving BMT #2.     BMT Transplant Date: BMT #2 ; Day 2 (2/16/21)    Isael has utilized our services throughout previous transplants. He also has experience with integrative therapies from [facility].     Assessment    Today Isael would like to discuss and try clinical hypnosis. He has experience with entertainment hypnosis so I've provided education on the differences in the two practices. Isael would like to try hypnosis to improve his swallowing. He talks about how sometimes he \"like freezes\" and it becomes difficult to swallow. He describes it as though he, \"is telling his throat what to do but it will not do it\".     We did a session of clinical hypnosis using the \"dial technique\". Isael noted that at first he didn't notice any change when he turned the dial down, but that he noticed a significant change when turning the dial up and then back down.       Post Session Observation: Calm/Relaxed    Isael responded really well to hypnosis and I believe this could be very beneficial with him to maintain his appetite and overcome hesitancy with swallowing and maintaining oral intake.    Intervention    Integrative Therapy(ies) Provided: Clinical Hypnosis    Plan for Follow up    I plan to follow Isael throughout his transplant process.     Isael and I discussed that hypnosis will take time to make significant changes to improve his ability to swallow.    Claudia Floyd DNP (Mo), RN  Integrative Nurse Clinician  Pediatric Blood and Marrow Transplant  Integrative Therapy Program  Pager #: [phone]      Time Spent:45 minutes            "

## 2021-02-18 NOTE — PLAN OF CARE
Isael has been afebrile. Will need reinforcement of teaching about doing axillary vs oral temps. OVSS. LSC on RA. C/o pain in abdomen, requested/received PRN Benadryl for sleep. No nausea. Stool x2 but pt flushed both. Per pt report, stool still loose. Good UOP. Received Mag replacement. Recheck at 0820. No other replacements needed. Tacro gtt unchanged. Hourly rounding completed. Continue to monitor and follow plan of care.

## 2021-02-19 LAB
ANION GAP SERPL CALCULATED.3IONS-SCNC: 8 MMOL/L (ref 3–14)
BACTERIA SPEC CULT: NO GROWTH
BACTERIA SPEC CULT: NO GROWTH
BACTERIA SPEC CULT: NORMAL
BLD PROD TYP BPU: NORMAL
BLD PROD TYP BPU: NORMAL
BLD UNIT ID BPU: 0
BLOOD PRODUCT CODE: NORMAL
BPU ID: NORMAL
BUN SERPL-MCNC: 12 MG/DL (ref 7–30)
CALCIUM SERPL-MCNC: 8.2 MG/DL (ref 8.5–10.1)
CHLORIDE SERPL-SCNC: 106 MMOL/L (ref 94–109)
CO2 SERPL-SCNC: 25 MMOL/L (ref 20–32)
CREAT SERPL-MCNC: 0.49 MG/DL (ref 0.66–1.25)
DIFFERENTIAL METHOD BLD: ABNORMAL
ERYTHROCYTE [DISTWIDTH] IN BLOOD BY AUTOMATED COUNT: 11.4 % (ref 10–15)
GFR SERPL CREATININE-BSD FRML MDRD: >90 ML/MIN/{1.73_M2}
GLUCOSE SERPL-MCNC: 108 MG/DL (ref 70–99)
HCT VFR BLD AUTO: 29.6 % (ref 40–53)
HGB BLD-MCNC: 11 G/DL (ref 13.3–17.7)
Lab: NORMAL
Lab: NORMAL
MAGNESIUM SERPL-MCNC: 1.3 MG/DL (ref 1.6–2.3)
MAGNESIUM SERPL-MCNC: 1.9 MG/DL (ref 1.6–2.3)
MCH RBC QN AUTO: 31.8 PG (ref 26.5–33)
MCHC RBC AUTO-ENTMCNC: 37.2 G/DL (ref 31.5–36.5)
MCV RBC AUTO: 86 FL (ref 78–100)
NUM BPU REQUESTED: 1
PHOSPHATE SERPL-MCNC: 3 MG/DL (ref 2.5–4.5)
PLATELET # BLD AUTO: 12 10E9/L (ref 150–450)
POTASSIUM SERPL-SCNC: 3.6 MMOL/L (ref 3.4–5.3)
RBC # BLD AUTO: 3.46 10E12/L (ref 4.4–5.9)
SODIUM SERPL-SCNC: 139 MMOL/L (ref 133–144)
SPECIMEN SOURCE: NORMAL
TACROLIMUS BLD-MCNC: 10.5 UG/L (ref 5–15)
TME LAST DOSE: NORMAL H
TRANSFUSION STATUS PATIENT QL: NORMAL
TRANSFUSION STATUS PATIENT QL: NORMAL
WBC # BLD AUTO: 0 10E9/L (ref 4–11)

## 2021-02-19 PROCEDURE — 250N000013 HC RX MED GY IP 250 OP 250 PS 637: Performed by: NURSE PRACTITIONER

## 2021-02-19 PROCEDURE — 250N000011 HC RX IP 250 OP 636: Performed by: PEDIATRICS

## 2021-02-19 PROCEDURE — 85027 COMPLETE CBC AUTOMATED: CPT

## 2021-02-19 PROCEDURE — 83735 ASSAY OF MAGNESIUM: CPT | Performed by: NURSE PRACTITIONER

## 2021-02-19 PROCEDURE — 80197 ASSAY OF TACROLIMUS: CPT | Performed by: NURSE PRACTITIONER

## 2021-02-19 PROCEDURE — 258N000003 HC RX IP 258 OP 636: Performed by: NURSE PRACTITIONER

## 2021-02-19 PROCEDURE — P9037 PLATE PHERES LEUKOREDU IRRAD: HCPCS | Performed by: PEDIATRICS

## 2021-02-19 PROCEDURE — 99233 SBSQ HOSP IP/OBS HIGH 50: CPT | Performed by: PEDIATRICS

## 2021-02-19 PROCEDURE — 206N000001 HC R&B BMT UMMC

## 2021-02-19 PROCEDURE — 250N000009 HC RX 250: Performed by: PEDIATRICS

## 2021-02-19 PROCEDURE — 250N000011 HC RX IP 250 OP 636: Performed by: NURSE PRACTITIONER

## 2021-02-19 PROCEDURE — 84100 ASSAY OF PHOSPHORUS: CPT | Performed by: NURSE PRACTITIONER

## 2021-02-19 PROCEDURE — 250N000013 HC RX MED GY IP 250 OP 250 PS 637: Performed by: PEDIATRICS

## 2021-02-19 PROCEDURE — 80048 BASIC METABOLIC PNL TOTAL CA: CPT | Performed by: NURSE PRACTITIONER

## 2021-02-19 PROCEDURE — 83735 ASSAY OF MAGNESIUM: CPT | Performed by: PEDIATRICS

## 2021-02-19 PROCEDURE — 85025 COMPLETE CBC W/AUTO DIFF WBC: CPT | Performed by: NURSE PRACTITIONER

## 2021-02-19 PROCEDURE — 250N000009 HC RX 250: Performed by: NURSE PRACTITIONER

## 2021-02-19 PROCEDURE — 258N000003 HC RX IP 258 OP 636: Performed by: PEDIATRICS

## 2021-02-19 PROCEDURE — 258N000001 HC RX 258: Performed by: NURSE PRACTITIONER

## 2021-02-19 RX ADMIN — ACYCLOVIR SODIUM 600 MG: 50 INJECTION, SOLUTION INTRAVENOUS at 00:54

## 2021-02-19 RX ADMIN — LORAZEPAM 1 MG: 2 INJECTION INTRAMUSCULAR; INTRAVENOUS at 10:18

## 2021-02-19 RX ADMIN — PANTOPRAZOLE SODIUM 40 MG: 40 TABLET, DELAYED RELEASE ORAL at 08:09

## 2021-02-19 RX ADMIN — CEFEPIME 2 G: 2 INJECTION, POWDER, FOR SOLUTION INTRAVENOUS at 13:10

## 2021-02-19 RX ADMIN — LORAZEPAM 1 MG: 2 INJECTION INTRAMUSCULAR; INTRAVENOUS at 04:17

## 2021-02-19 RX ADMIN — VANCOMYCIN HYDROCHLORIDE 125 MG: 125 CAPSULE ORAL at 08:09

## 2021-02-19 RX ADMIN — MAGNESIUM SULFATE HEPTAHYDRATE 3000 MG: 500 INJECTION, SOLUTION INTRAMUSCULAR; INTRAVENOUS at 06:12

## 2021-02-19 RX ADMIN — DIPHENHYDRAMINE HYDROCHLORIDE 50 MG: 50 INJECTION, SOLUTION INTRAMUSCULAR; INTRAVENOUS at 11:48

## 2021-02-19 RX ADMIN — CEFEPIME 2 G: 2 INJECTION, POWDER, FOR SOLUTION INTRAVENOUS at 21:33

## 2021-02-19 RX ADMIN — DIPHENHYDRAMINE HYDROCHLORIDE 50 MG: 50 INJECTION, SOLUTION INTRAMUSCULAR; INTRAVENOUS at 00:45

## 2021-02-19 RX ADMIN — VANCOMYCIN HYDROCHLORIDE 125 MG: 125 CAPSULE ORAL at 15:48

## 2021-02-19 RX ADMIN — MYCOPHENOLATE MOFETIL 960 MG: 500 INJECTION, POWDER, LYOPHILIZED, FOR SOLUTION INTRAVENOUS at 22:04

## 2021-02-19 RX ADMIN — MICAFUNGIN SODIUM 150 MG: 50 INJECTION, POWDER, LYOPHILIZED, FOR SOLUTION INTRAVENOUS at 10:18

## 2021-02-19 RX ADMIN — MYCOPHENOLATE MOFETIL 960 MG: 500 INJECTION, POWDER, LYOPHILIZED, FOR SOLUTION INTRAVENOUS at 06:12

## 2021-02-19 RX ADMIN — ACYCLOVIR SODIUM 600 MG: 50 INJECTION, SOLUTION INTRAVENOUS at 08:09

## 2021-02-19 RX ADMIN — VANCOMYCIN HYDROCHLORIDE 125 MG: 125 CAPSULE ORAL at 13:05

## 2021-02-19 RX ADMIN — ACYCLOVIR SODIUM 600 MG: 50 INJECTION, SOLUTION INTRAVENOUS at 15:48

## 2021-02-19 RX ADMIN — VANCOMYCIN HYDROCHLORIDE 125 MG: 125 CAPSULE ORAL at 19:42

## 2021-02-19 RX ADMIN — URSODIOL 600 MG: 300 CAPSULE ORAL at 13:05

## 2021-02-19 RX ADMIN — POTASSIUM CHLORIDE: 2 INJECTION, SOLUTION, CONCENTRATE INTRAVENOUS at 19:44

## 2021-02-19 RX ADMIN — URSODIOL 600 MG: 300 CAPSULE ORAL at 19:42

## 2021-02-19 RX ADMIN — LORAZEPAM 1 MG: 2 INJECTION INTRAMUSCULAR; INTRAVENOUS at 22:04

## 2021-02-19 RX ADMIN — MORPHINE SULFATE 2 MG: 2 INJECTION, SOLUTION INTRAMUSCULAR; INTRAVENOUS at 13:51

## 2021-02-19 RX ADMIN — DEXTROSE MONOHYDRATE 0.01 MG/KG/DAY: 5 INJECTION, SOLUTION INTRAVENOUS at 19:42

## 2021-02-19 RX ADMIN — I.V. FAT EMULSION 240 ML: 20 EMULSION INTRAVENOUS at 19:50

## 2021-02-19 RX ADMIN — DEXTROSE AND SODIUM CHLORIDE: 5; 450 INJECTION, SOLUTION INTRAVENOUS at 13:05

## 2021-02-19 RX ADMIN — URSODIOL 600 MG: 300 CAPSULE ORAL at 08:09

## 2021-02-19 RX ADMIN — SALINE NASAL SPRAY 1 SPRAY: 1.5 SOLUTION NASAL at 19:44

## 2021-02-19 RX ADMIN — MYCOPHENOLATE MOFETIL 960 MG: 500 INJECTION, POWDER, LYOPHILIZED, FOR SOLUTION INTRAVENOUS at 13:54

## 2021-02-19 RX ADMIN — PANTOPRAZOLE SODIUM 40 MG: 40 TABLET, DELAYED RELEASE ORAL at 19:42

## 2021-02-19 RX ADMIN — CEFEPIME 2 G: 2 INJECTION, POWDER, FOR SOLUTION INTRAVENOUS at 04:17

## 2021-02-19 RX ADMIN — LORAZEPAM 1 MG: 2 INJECTION INTRAMUSCULAR; INTRAVENOUS at 15:48

## 2021-02-19 ASSESSMENT — MIFFLIN-ST. JEOR: SCORE: 1610.37

## 2021-02-19 NOTE — PROGRESS NOTES
"SPIRITUAL HEALTH SERVICES: Tele-Encounter  Patient Location: [facility] BMT  Spoke with: mom. Mervat  Referral Source: -initiated    DATA:  Brief phone-call with Artemio's mom, Mervat.  Reminded her of our visit in 2018 and asked if she had any interest in receiving visits or support with this admission.  She stated \"I don't think so\" but expressed appreciation for check-in.    PLAN:  No anticipated follow-up, though [facility] remains available to patient & family throughout stay.    Chaplain Екатерина Maravilla    ______________________________    Type of service:  Telephone Visit     has received verbal consent for a TelephoneVisit from the patient? Yes    Distance Provider Location: designated [place] office or home office (secure setting)    Mode of Communication: telephone (via [employer] phone or [employer] tele-call-number ([phone]))      "

## 2021-02-19 NOTE — PROGRESS NOTES
CLINICAL NUTRITION SERVICES - REASSESSMENT NOTE    ANTHROPOMETRICS  Height: 173.5 cm,    Weight: 62.6 kg (lowest weight this admit on , -2.8 kg since admit on )  BMI: 20.8 kg / m2,   Dosing Weight: 64.5 kg - weight used for TPN  Comments: -2.8 kg since admission likely in combination of poor PO and persistent diarrhea.     CURRENT NUTRITION ORDERS  Diet:Regular    CURRENT NUTRITION SUPPORT   Parenteral Nutrition:  Type of Parenteral Access: Central  PN frequency: Continuous    PN of 1800 mLs, Dextrose 236 g, GIR 2.54 mg/kg/min, 100 g Amino Acids, 1.55 g/kg Amino Acids, 240 mL lipids, 0.8 g/kg for 1670 kcals, (26 kcal/kg). PN is meeting 100% of kcal needs and 100% of protein needs.    Intake/Tolerance: Since PN started on , Patient has been at goal. PO intake has been improving with mucositis/mouth pain improvement. Patient reports eating 50% of two meals each day. Yesterday patient had mac and cheese and yogurt.    Current factors affecting nutrition intake include:abdominal pain, diarrhea and mouth sores    NEW FINDINGS:  - positive c diff on , some blood in stools  - mucositis on tongue and inside of cheeks  -BMT: day +3    LABS  Labs reviewed  Triglycerides: 211(appropriate for TPN) - ,  Ma.3(L),     MEDICATIONS  Medications reviewed  Mag replacement    ASSESSED NUTRITION NEEDS:  Valentino Equation: BMR (1660) x 1.2 - 1.4 = 6615-5430 kcal   Estimated Energy Needs: 30-35 kcal/kg EN/PO; 25-30 kcal/kg PN; 25-35 kcal/kg PN+EN  Estimated Protein Needs: 1.5-2g/kg  Estimated Fluid Needs: 1 ml/kcal or per MD    Micronutrient Needs: per RDA    MALNUTRITION  % Intake: No decreased intake noted  % Weight Loss: Up to 5% in 1 month (non-severe)  Subcutaneous Fat Loss: Unable to assess  Muscle Loss: Unable to assess  Fluid Accumulation/Edema: None noted  Malnutrition Diagnosis: Patient does not meet two of the established criteria necessary for diagnosing malnutrition    Unable to assess at this  time    EVALUATION OF PREVIOUS PLAN OF CARE:   Monitoring from previous assessment:  Food and Beverage intake- ~1 meal per day, not meeting needs PO  Enteral and parenteral nutrition intake- started 2/16, at goal, meeting 100% of needs  Anthropometric measurements- decreasing since admit, but stable since start of PN     Previous Goals:   1. Po and/or nutrition support to meet greater than 75% of needs - met  2. Weight maintenance during hospital stay - not met    Previous Nutrition Diagnosis:   Predicted suboptimal nutrient intake related to anticipated decline in po as evidence by likelihood of symptoms from treatment course affecting po intake.   Evaluation: No change    NUTRITION DIAGNOSIS:  Predicted suboptimal nutrient intake related to decline in po as evidence by reliance on TPN to meet estimated needs with potential for interruption.    INTERVENTIONS  Nutrition Prescription  Po/nutrition support to meet needs for age appropriate growth    Implementation:  Parenteral Nutrition - continue as ordered  Offered boost or other supplement, patient declined.   Collaboration and Referral of Nutrition care - discussed plan of care for patient during rounds    Goals  1. Po and/or nutrition support to meet greater than 75% of needs  2. Weight maintenance during hospital stay    FOLLOW UP/MONITORING  Food and Beverage intake -  Enteral and parenteral nutrition intake -  Anthropometric measurements -    RECOMMENDATIONS  1. Continue nutrition plan of care   2. Monitor PO and weight throughout admission    Brittany Santiago  Dietetic Intern

## 2021-02-19 NOTE — PLAN OF CARE
Afebrile. VSS. Reported abdominal pain and asked for PRN Morphine with good effect. No nausea. Stool x2, loose/watery and dark brown. CVC biopatch still having some bleeding this morning and platelets given. No further bleeding noted. Continue to monitor and assess. Hourly rounding completed.

## 2021-02-19 NOTE — PROGRESS NOTES
Pediatric BMT Daily Progress Note    Interval Events: Isael remains afebrile and clinically stable while counts are at liana (day +3). Morphine taken prn for abdominal pain yesterday. Concerns of bloody stools but occult negative - possibly related to the flaming hot cheetos. Having some bleeding around the biopatch site - changed overnight and bleeding again.     Review of Systems: Pertinent positives include those mentioned in interval events. A complete review of systems was performed and is otherwise negative.      Medications:  Please see MAR    Physical Exam:    Temp:  [98.5  F (36.9  C)-98.9  F (37.2  C)] 98.8  F (37.1  C)  Pulse:  [] 98  Resp:  [14-18] 18  BP: ()/(60-80) 108/76  Cuff Mean (mmHg):  [75] 75  SpO2:  [97 %-100 %] 100 %     I/O last 3 completed shifts:  In: 3591.48 [I.V.:1301.48; Other:250]  Out: 2644 [Urine:2344; Stool:300]    GEN: Interactive, pleasant and cooperative, sitting in bed. NAD.    HEENT: Head NC/AT, PERRL, EOMs intact, sclerae clear, nares patent, MMM. Erythema and ridging of lateral tongue and interior cheeks without visible sores.  CARD: RRR, normal S1/S2 without murmur.  RESP: Lungs clear to auscultation bilaterally. Normal work of breathing. No adventitious lung sounds.  ABD: Soft, NT, ND, NABS present, no organomegaly or masses noted.   EXTREM: WWP, MAEE  SKIN: Dry skin; Intact without erythema or rash on exposed skin surfaces  NEURO: No focal deficits  ACCESS: Double lumen burns, port (not accessed)    Labs: All reviewed with pertinent positives: Hgb 11, Plts 12k; BUN 12, Creat 0.49    Assessment and Plan: Artemio is a 21 yo male with history of multiply relapsed ALL undergoing conditioning/UCBT per 2013-09. Day +3, afebrile and clinically stable. C.Diff positive 2/17 for which he is on oral vancomycin.     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 S/p preparative regimen per MT2013-09 with Thiotepa (days -7 trough -6), Fludarabine (days -5 through -3), Busulfan (days -5 through -3), ATG (days -4 through -2) followed by UCBT (2/16). Counts at liana.  - Disease (BMBx) and donor evaluations due day +21     # Risk for GVHD:   - Continue Tacrolimus, goal 10-15 through day +14. Level 12.7 yesterday,  with repeat level pending from today.   - Continue MMF-- levels due Sunday.    FEN/Renal:  # Risk for malnutrition: intake minimal  - Regular diet as tolerated  - Dietician to follow  - Continue TPN/IL    # Electrolyte abnormalities:    - Check daily electrolytes   - Replace/adjust in TPN as indicated- hypokalemia and hypomagnesemia.  Both replaced.      # Risk for renal dysfunction and fluid overload: GFR (1/28): 119 mls/min  - Monitor I/O's and daily weights     # Risk for aHUS/TA-TMA:    - Monitor LDH qMon/Thurs: 146 (2/18)  - Monitor urine protein/creatinine qTuesday     Pulmonary:  # Risk for pulmonary insufficiency: No current concerns    Cardiovascular:  # Asymptomatic WPW syndrome (diagnosed 2018): no interventions to date. Most recent EKG (1/27). ECHO (1/26) normal, EF 64%. 24h Zio patch holter (1/27-1/28) revealed c/w WPW with no ventricular ectopies present. Cardiology following.   - Obtain weekly BNP (uptrended to 664 2/16) and troponin (<0.015 2/16). Per cardiology, elevated BNP likely attributed to fluid shifts (threshold of concern >5000). Next due 2/23.  - Follow up ECHO in 6 mos (7/2021)     # Risk for hypertension secondary to medications: Closely monitor following admission.     Heme:   # Pancytopenia secondary to chemotherapy  - Transfuse for hemoglobin < 7, platelets < 10,000  - Hx of transfusion reactions (hives): Premedicate with tylenol and benadryl prior to pRBCs and plts  - GCSF 5 mcg/kg IV daily to  begin day +5 per protocol and continue until ANC >/= 2500 x 3 consecutive days.     Infectious Disease:  # Fever in context of BMT conditioning and central access (2/13):  - Follow Blood cxs (NGTD). Of note, also has un-accessed port-a-cath.   - Continue empiric Cefepime through count recovery    # C.Diff (2/17): continue 10d course of oral vancomycin through 2/26    # Risk for infection given immunocompromised status with need for prophylaxis:  - Viral (CMV/HSV sero positive): HD Acyclovir. Weekly CMV neg 2/17.  - Fungal: Micafungin. Transition to either itraconazole or posaconazole when able.  - Bacterial: Cefepime as above  - PCP: Pentamidine given 2/15. Consider Bactrim next month if WBC criteria met (no hx of adverse reaction).     Past infections: C. Diff (November 2020)     GI:   # Reflux:  - BID protonix   - PRN Tums     # Nausea management:   - Zofran gtt; ativan q6h. finished 3d course of emend (2/16-2/18).   - Benadryl prn     # Risk for VOD  - Ursodiol TID     # Loose stools:  - see C.Diff above  - maintain hydration     Neuro:  # Mucositis/abdominal pain:    - tylenol PRN   - morphine PRN  - encourage warm packs     # Seizure prophylaxis: s/p Keppra     # History of significant spinal headaches: consider using Candelario needle with lumbar punctures    # CRS history. 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 No CRS, neurotoxicity or infectious complications from [place] CAR-T.     Fertility: Sperm collected previously collected for fertility preservation     Discharge Considerations: Expected lengths of hospitalization for patients undergoing stem cell transplantation vary by primary diagnosis, conditioning regimen, graft source, and development of complications. A typical stay is 6 weeks.    The above plan of care was developed by and communicated to me by the   Pediatric BMT attending physician, Viky Zavala,  MD.     Judy ADAMSON, CNP  Pediatric Nurse Practitioner  Pediatric Blood and Marrow Transplant  [phone] - Pager  [phone] - BMT workroom  [phone] - BMT Clinic      Pediatric BMT Attending Inpatient Note:  Artemio was seen and evaluated by me today.      The significant interval history diarrhea is better.  Was concern that his stool potentially had blood, was found to not.       I have reviewed changes and data from the last 24 hours, including medications, laboratory results, vital signs and pathology results.      I have formulated and discussed the plan with the BMT team. The relevant clinical topics addressed included the followin21 y/o M w/ h/o multiply relapsed Pre-B cell ALL (s/p MSD BMT, CART-19, CART-22) in CR3 here for consolidation of remission with MAC (FluBuTT ATG) S/P UCBT, risk for malnutrition- will start TPN, risk for nausea-finished course of emend today, risk for VOD, risk for gastritis, risk for opportunistic infection on micafungin, valtrex and pentamidine and cefepime, now with C. Diff infection- on vancomycin, at risk of GVHD- on tacro and MMF, risk of mucositis- morphine PRN, WPW, monitoring pro-BNP and troponins, recent Ziopatch per cards had no PACs or PVCs.  Encouraged Isael to take morphine as he is showing signs of mucositis.       I discussed the course and plan with the patient/family and answered all of their questions to the best of my ability.     My care coordination activities today include oversight of planned lab studies, oversight of medication changes and discussion with BMT team-members.      My total floor time today was at least 40 minutes, greater than 50% of which was counseling and coordination of care.    iVky Zavala MD, PhD    Pediatric Blood and Marrow Transplant  [facility]                          Patient Active Problem List   Diagnosis     Acute lymphoblastic leukemia (ALL)  in remission (H)     Aaron-Parkinson-White (WPW) syndrome     Bone marrow transplant candidate     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)     At risk for infection     S/P allogeneic bone marrow transplant (H)     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     Examination of participant or control in clinical research

## 2021-02-19 NOTE — PLAN OF CARE
Pt is afebrile, VSS. Lung sounds clear, equal, bilateral. Pt is voiding, 1x stool in evening with bloody streaks, c diff positive. Slight abdominal discomfort rated at 4/10, denied interventions. No other pain or nausea. Dried bloody drainage, denying nasal spray. Tacro drip infusing. TPN and lipids infusing. Mom at bedside.

## 2021-02-19 NOTE — PLAN OF CARE
Isael has been afebrile. VSS. LSC on RA. Reported abdominal pain and asked for PRN Benadryl with good effect. No nausea. Stool x2, loose/watery and dark brown, but no blood noted. Good UOP. Mag replacement currently infusing. No other replacements needed. CVC biopatch noted to be saturated with fresh blood around 0430. MD notified and dressing changed. CVC site still actively bleeding slightly at time of drsg change. Securing device was still intact. Continue to monitor and assess. Hourly rounding completed.

## 2021-02-20 LAB
ABO + RH BLD: NORMAL
ABO + RH BLD: NORMAL
ANION GAP SERPL CALCULATED.3IONS-SCNC: 6 MMOL/L (ref 3–14)
BLD GP AB SCN SERPL QL: NORMAL
BLOOD BANK CMNT PATIENT-IMP: NORMAL
BUN SERPL-MCNC: 15 MG/DL (ref 7–30)
CALCIUM SERPL-MCNC: 8.4 MG/DL (ref 8.5–10.1)
CHLORIDE SERPL-SCNC: 109 MMOL/L (ref 94–109)
CO2 SERPL-SCNC: 23 MMOL/L (ref 20–32)
CREAT SERPL-MCNC: 0.51 MG/DL (ref 0.66–1.25)
DIFFERENTIAL METHOD BLD: ABNORMAL
ERYTHROCYTE [DISTWIDTH] IN BLOOD BY AUTOMATED COUNT: 11.3 % (ref 10–15)
GFR SERPL CREATININE-BSD FRML MDRD: >90 ML/MIN/{1.73_M2}
GLUCOSE SERPL-MCNC: 116 MG/DL (ref 70–99)
HCT VFR BLD AUTO: 28.1 % (ref 40–53)
HGB BLD-MCNC: 10.1 G/DL (ref 13.3–17.7)
MCH RBC QN AUTO: 32.1 PG (ref 26.5–33)
MCHC RBC AUTO-ENTMCNC: 35.9 G/DL (ref 31.5–36.5)
MCV RBC AUTO: 89 FL (ref 78–100)
PLATELET # BLD AUTO: 32 10E9/L (ref 150–450)
POTASSIUM SERPL-SCNC: 3.7 MMOL/L (ref 3.4–5.3)
RBC # BLD AUTO: 3.15 10E12/L (ref 4.4–5.9)
SODIUM SERPL-SCNC: 138 MMOL/L (ref 133–144)
SPECIMEN EXP DATE BLD: NORMAL
TACROLIMUS BLD-MCNC: 9.1 UG/L (ref 5–15)
TME LAST DOSE: NORMAL H
WBC # BLD AUTO: 0 10E9/L (ref 4–11)

## 2021-02-20 PROCEDURE — 250N000011 HC RX IP 250 OP 636: Performed by: NURSE PRACTITIONER

## 2021-02-20 PROCEDURE — 86850 RBC ANTIBODY SCREEN: CPT | Performed by: PEDIATRICS

## 2021-02-20 PROCEDURE — 258N000003 HC RX IP 258 OP 636: Performed by: PEDIATRICS

## 2021-02-20 PROCEDURE — 86900 BLOOD TYPING SEROLOGIC ABO: CPT | Performed by: PEDIATRICS

## 2021-02-20 PROCEDURE — 80048 BASIC METABOLIC PNL TOTAL CA: CPT | Performed by: NURSE PRACTITIONER

## 2021-02-20 PROCEDURE — 80197 ASSAY OF TACROLIMUS: CPT | Performed by: NURSE PRACTITIONER

## 2021-02-20 PROCEDURE — 250N000009 HC RX 250: Performed by: NURSE PRACTITIONER

## 2021-02-20 PROCEDURE — 85027 COMPLETE CBC AUTOMATED: CPT

## 2021-02-20 PROCEDURE — 99233 SBSQ HOSP IP/OBS HIGH 50: CPT | Performed by: PEDIATRICS

## 2021-02-20 PROCEDURE — 250N000009 HC RX 250: Performed by: PEDIATRICS

## 2021-02-20 PROCEDURE — 250N000011 HC RX IP 250 OP 636: Performed by: PEDIATRICS

## 2021-02-20 PROCEDURE — 86901 BLOOD TYPING SEROLOGIC RH(D): CPT | Performed by: PEDIATRICS

## 2021-02-20 PROCEDURE — 250N000013 HC RX MED GY IP 250 OP 250 PS 637: Performed by: PEDIATRICS

## 2021-02-20 PROCEDURE — 258N000003 HC RX IP 258 OP 636: Performed by: NURSE PRACTITIONER

## 2021-02-20 PROCEDURE — 250N000013 HC RX MED GY IP 250 OP 250 PS 637: Performed by: NURSE PRACTITIONER

## 2021-02-20 PROCEDURE — 258N000001 HC RX 258: Performed by: NURSE PRACTITIONER

## 2021-02-20 PROCEDURE — 206N000001 HC R&B BMT UMMC

## 2021-02-20 RX ORDER — AMOXICILLIN 250 MG
1 CAPSULE ORAL 2 TIMES DAILY
Status: DISCONTINUED | OUTPATIENT
Start: 2021-02-20 | End: 2021-02-20

## 2021-02-20 RX ORDER — AMOXICILLIN 250 MG
2 CAPSULE ORAL 2 TIMES DAILY
Status: DISCONTINUED | OUTPATIENT
Start: 2021-02-20 | End: 2021-02-20

## 2021-02-20 RX ORDER — POLYETHYLENE GLYCOL 3350 17 G/17G
17 POWDER, FOR SOLUTION ORAL DAILY
Status: DISCONTINUED | OUTPATIENT
Start: 2021-02-20 | End: 2021-02-20

## 2021-02-20 RX ADMIN — LORAZEPAM 1 MG: 2 INJECTION INTRAMUSCULAR; INTRAVENOUS at 10:34

## 2021-02-20 RX ADMIN — PANTOPRAZOLE SODIUM 40 MG: 40 TABLET, DELAYED RELEASE ORAL at 07:57

## 2021-02-20 RX ADMIN — CEFEPIME 2 G: 2 INJECTION, POWDER, FOR SOLUTION INTRAVENOUS at 20:44

## 2021-02-20 RX ADMIN — POTASSIUM CHLORIDE: 2 INJECTION, SOLUTION, CONCENTRATE INTRAVENOUS at 20:17

## 2021-02-20 RX ADMIN — Medication: at 15:40

## 2021-02-20 RX ADMIN — CEFEPIME 2 G: 2 INJECTION, POWDER, FOR SOLUTION INTRAVENOUS at 12:33

## 2021-02-20 RX ADMIN — MICAFUNGIN SODIUM 150 MG: 50 INJECTION, POWDER, LYOPHILIZED, FOR SOLUTION INTRAVENOUS at 10:35

## 2021-02-20 RX ADMIN — VANCOMYCIN HYDROCHLORIDE 125 MG: 125 CAPSULE ORAL at 07:57

## 2021-02-20 RX ADMIN — MYCOPHENOLATE MOFETIL 960 MG: 500 INJECTION, POWDER, LYOPHILIZED, FOR SOLUTION INTRAVENOUS at 13:39

## 2021-02-20 RX ADMIN — MYCOPHENOLATE MOFETIL 960 MG: 500 INJECTION, POWDER, LYOPHILIZED, FOR SOLUTION INTRAVENOUS at 22:19

## 2021-02-20 RX ADMIN — URSODIOL 600 MG: 300 CAPSULE ORAL at 07:57

## 2021-02-20 RX ADMIN — ACYCLOVIR SODIUM 600 MG: 50 INJECTION, SOLUTION INTRAVENOUS at 00:51

## 2021-02-20 RX ADMIN — DEXTROSE AND SODIUM CHLORIDE: 5; 450 INJECTION, SOLUTION INTRAVENOUS at 19:30

## 2021-02-20 RX ADMIN — SALINE NASAL SPRAY 1 SPRAY: 1.5 SOLUTION NASAL at 20:17

## 2021-02-20 RX ADMIN — VANCOMYCIN HYDROCHLORIDE 125 MG: 125 CAPSULE ORAL at 17:00

## 2021-02-20 RX ADMIN — DIPHENHYDRAMINE HYDROCHLORIDE 50 MG: 50 INJECTION, SOLUTION INTRAMUSCULAR; INTRAVENOUS at 22:31

## 2021-02-20 RX ADMIN — LORAZEPAM 1 MG: 2 INJECTION INTRAMUSCULAR; INTRAVENOUS at 15:36

## 2021-02-20 RX ADMIN — DEXTROSE MONOHYDRATE 0.02 MG/KG/DAY: 5 INJECTION, SOLUTION INTRAVENOUS at 20:23

## 2021-02-20 RX ADMIN — URSODIOL 600 MG: 300 CAPSULE ORAL at 19:38

## 2021-02-20 RX ADMIN — ACYCLOVIR SODIUM 600 MG: 50 INJECTION, SOLUTION INTRAVENOUS at 07:55

## 2021-02-20 RX ADMIN — MYCOPHENOLATE MOFETIL 960 MG: 500 INJECTION, POWDER, LYOPHILIZED, FOR SOLUTION INTRAVENOUS at 05:53

## 2021-02-20 RX ADMIN — URSODIOL 600 MG: 300 CAPSULE ORAL at 15:14

## 2021-02-20 RX ADMIN — LORAZEPAM 1 MG: 2 INJECTION INTRAMUSCULAR; INTRAVENOUS at 04:58

## 2021-02-20 RX ADMIN — PANTOPRAZOLE SODIUM 40 MG: 40 TABLET, DELAYED RELEASE ORAL at 19:38

## 2021-02-20 RX ADMIN — CEFEPIME 2 G: 2 INJECTION, POWDER, FOR SOLUTION INTRAVENOUS at 04:58

## 2021-02-20 RX ADMIN — VANCOMYCIN HYDROCHLORIDE 125 MG: 125 CAPSULE ORAL at 19:38

## 2021-02-20 RX ADMIN — LORAZEPAM 1 MG: 2 INJECTION INTRAMUSCULAR; INTRAVENOUS at 22:19

## 2021-02-20 RX ADMIN — ACYCLOVIR SODIUM 600 MG: 50 INJECTION, SOLUTION INTRAVENOUS at 15:24

## 2021-02-20 RX ADMIN — I.V. FAT EMULSION 240 ML: 20 EMULSION INTRAVENOUS at 19:30

## 2021-02-20 RX ADMIN — VANCOMYCIN HYDROCHLORIDE 125 MG: 125 CAPSULE ORAL at 12:33

## 2021-02-20 ASSESSMENT — MIFFLIN-ST. JEOR
SCORE: 1613.37
SCORE: 1607.37

## 2021-02-20 NOTE — PROGRESS NOTES
Pediatric BMT Daily Progress Note    Interval Events: Isael remains afebrile and clinically stable. Morphine taken prn for abdominal pain as needed.  Denies any other concerns.     Review of Systems: Pertinent positives include those mentioned in interval events. A complete review of systems was performed and is otherwise negative.      Medications:  Please see MAR    Physical Exam:    Temp:  [98.7  F (37.1  C)-99.1  F (37.3  C)] 98.7  F (37.1  C)  Pulse:  [] 95  Resp:  [15-18] 18  BP: ()/(58-86) 112/75  SpO2:  [99 %-100 %] 99 %     I/O last 3 completed shifts:  In: 3005 [I.V.:1060]  Out: 2606 [Urine:2256; Stool:350]    GEN: Interactive, pleasant and cooperative, sitting in bed. NAD.  Mom present.   HEENT: Head NC/AT, PERRL, EOMs intact, sclerae clear, nares patent, MMM. Erythema and ridging of lateral tongue and interior cheeks without visible sores.  CARD: RRR, normal S1/S2 without murmur.  RESP: Lungs clear to auscultation bilaterally. Normal work of breathing. No adventitious lung sounds.  ABD: Soft, NT, ND, NABS present, no organomegaly or masses noted.   EXTREM: WWP, MAEE  SKIN: Dry skin; Intact without erythema or rash on exposed skin surfaces  NEURO: No focal deficits  ACCESS: Double lumen burns, port (not accessed)    Labs: All reviewed with pertinent positives: Hgb 10.1, Plts 22k; BUN 15, Creat 0.51    Assessment and Plan: Artemio is a 19 yo male with history of multiply relapsed ALL undergoing conditioning/UCBT per 2013-09. Day +4, afebrile and clinically stable. C.Diff positive 2/17 for which he is on oral vancomycin.     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) and PLAT-05 at [place] (loss of CD22 CAR and rejection of CD19 directed CAR without evidence of disease day +21). S/p preparative regimen per MT2013-09 with Thiotepa (days -7 trough -6),  Fludarabine (days -5 through -3), Busulfan (days -5 through -3), ATG (days -4 through -2) followed by UCBT (2/16). Counts at liana.  - Disease (BMBx) and donor evaluations due day +21     # Risk for GVHD:   - Continue Tacrolimus, goal 10-15 through day +14. Level 10.5 yesterday,  with repeat level pending from today.   - Continue MMF-- levels due Sunday.    FEN/Renal:  # Risk for malnutrition: intake minimal  - Regular diet as tolerated  - Dietician to follow  - Continue TPN/IL    # Electrolyte abnormalities:    - Check daily electrolytes   - Replace/adjust in TPN as indicated- hypokalemia and hypomagnesemia.  Both replaced.      # Risk for renal dysfunction and fluid overload: GFR (1/28): 119 mls/min  - Monitor I/O's and daily weights     # Risk for aHUS/TA-TMA:    - Monitor LDH qMon/Thurs: 146 (2/18)  - Monitor urine protein/creatinine qTuesday     Pulmonary:  # Risk for pulmonary insufficiency: No current concerns    Cardiovascular:  # Asymptomatic WPW syndrome (diagnosed 2018): no interventions to date. Most recent EKG (1/27). ECHO (1/26) normal, EF 64%. 24h Zio patch holter (1/27-1/28) revealed c/w WPW with no ventricular ectopies present. Cardiology following.   - Obtain weekly BNP (uptrended to 664 2/16) and troponin (<0.015 2/16). Per cardiology, elevated BNP likely attributed to fluid shifts (threshold of concern >5000). Next due 2/23.  - Follow up ECHO in 6 mos (7/2021)     # Risk for hypertension secondary to medications: Closely monitor following admission.     Heme:   # Pancytopenia secondary to chemotherapy  - Transfuse for hemoglobin < 7, platelets < 10,000  - Hx of transfusion reactions (hives): Premedicate with tylenol and benadryl prior to pRBCs and plts  - GCSF 5 mcg/kg IV daily to begin day +5 per protocol and continue until ANC >/= 2500 x 3 consecutive days.     Infectious Disease:  # Fever in context of BMT conditioning and central access (2/13):  - Follow Blood cxs (NGTD). Of note, also has  un-accessed port-a-cath.   - Continue empiric Cefepime through count recovery    # C.Diff (2/17): continue 10d course of oral vancomycin through 2/26    # Risk for infection given immunocompromised status with need for prophylaxis:  - Viral (CMV/HSV sero positive): HD Acyclovir. Weekly CMV neg 2/17.  - Fungal: Micafungin. Transition to either itraconazole or posaconazole when able.  - Bacterial: Cefepime as above  - PCP: Pentamidine given 2/15. Consider Bactrim next month if WBC criteria met (no hx of adverse reaction).     Past infections: C. Diff (November 2020)     GI:   # Reflux:  - BID protonix   - PRN Tums     # Nausea management:   - Zofran gtt; ativan q6h. finished 3d course of emend (2/16-2/18).   - Benadryl prn     # Risk for VOD  - Ursodiol TID     # Loose stools:  - see C.Diff above  - maintain hydration     Neuro:  # Mucositis/abdominal pain:    - tylenol PRN   - will start morphine PCA with patient dosing only.  Monitor use.    - encourage warm packs     # Seizure prophylaxis: s/p Keppra     # History of significant spinal headaches: consider using Candelario needle with lumbar punctures    # CRS history. 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 No CRS, neurotoxicity or infectious complications from [place] CAR-T.     Fertility: Sperm collected previously collected for fertility preservation     Discharge Considerations: Expected lengths of hospitalization for patients undergoing stem cell transplantation vary by primary diagnosis, conditioning regimen, graft source, and development of complications. A typical stay is 6 weeks.    The above plan of care was developed by and communicated to me by the   Pediatric BMT attending physician, Viky Zavala MD.     Judy Malone APRN, CNP  Pediatric Nurse Practitioner  Pediatric Blood and Marrow Transplant  [phone] - Pager  [phone] - BMT workroom  [phone] -  BMT Clinic      Pediatric BMT Attending Inpatient Note:  Artemio was seen and evaluated by me today.      The significant interval history diarrhea is about the same.  Having some abdominal pain that could be unrelated to the diarrhea.     I have reviewed changes and data from the last 24 hours, including medications, laboratory results, vital signs and pathology results.      I have formulated and discussed the plan with the BMT team. The relevant clinical topics addressed included the followin21 y/o M w/ h/o multiply relapsed Pre-B cell ALL (s/p MSD BMT, CART-19, CART-22) in CR3 here for consolidation of remission with MAC (FluBuTT ATG) S/P UCBT, risk for malnutrition- will start TPN, risk for nausea-finishing course of emend today, risk for VOD, risk for gastritis, risk for opportunistic infection on micafungin, valtrex and pentamidine and cefepime, now with C. Diff infection- on vancomycin, at risk of GVHD- on tacro- adjusting levels and MMF, risk of mucositis- morphine PRN, WPW, monitoring pro-BNP and troponins, recent Ziopatch per cards had no PACs or PVCs.  Replacing magnesium and potassium as needed. Will start PCA today.         I discussed the course and plan with the patient/family and answered all of their questions to the best of my ability.     My care coordination activities today include oversight of planned lab studies, oversight of medication changes and discussion with BMT team-members.      My total floor time today was at least 40 minutes, greater than 50% of which was counseling and coordination of care.    Viky Zavala MD, PhD    Pediatric Blood and Marrow Transplant  [facility]        Patient Active Problem List   Diagnosis     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)     Aaron-Parkinson-White (WPW) syndrome     Bone marrow transplant candidate     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)     At risk for  infection     S/P allogeneic bone marrow transplant (H)     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     Examination of participant or control in clinical research

## 2021-02-20 NOTE — PLAN OF CARE
Tmax 99.1, other vs stable, lungs sounds clear. No complaints overnight. One loose stool. No replacements needed. POC reviewed, continue to monitor and notify MD with changes, Hourly rounding complete.

## 2021-02-21 LAB
ANION GAP SERPL CALCULATED.3IONS-SCNC: 6 MMOL/L (ref 3–14)
BUN SERPL-MCNC: 15 MG/DL (ref 7–30)
CALCIUM SERPL-MCNC: 8.5 MG/DL (ref 8.5–10.1)
CHLORIDE SERPL-SCNC: 108 MMOL/L (ref 94–109)
CO2 SERPL-SCNC: 25 MMOL/L (ref 20–32)
CREAT SERPL-MCNC: 0.52 MG/DL (ref 0.66–1.25)
DIFFERENTIAL METHOD BLD: ABNORMAL
ERYTHROCYTE [DISTWIDTH] IN BLOOD BY AUTOMATED COUNT: 10.9 % (ref 10–15)
GFR SERPL CREATININE-BSD FRML MDRD: >90 ML/MIN/{1.73_M2}
GLUCOSE SERPL-MCNC: 120 MG/DL (ref 70–99)
HCT VFR BLD AUTO: 28 % (ref 40–53)
HGB BLD-MCNC: 10 G/DL (ref 13.3–17.7)
MCH RBC QN AUTO: 31.4 PG (ref 26.5–33)
MCHC RBC AUTO-ENTMCNC: 35.7 G/DL (ref 31.5–36.5)
MCV RBC AUTO: 88 FL (ref 78–100)
PLATELET # BLD AUTO: 20 10E9/L (ref 150–450)
POTASSIUM SERPL-SCNC: 4.1 MMOL/L (ref 3.4–5.3)
RBC # BLD AUTO: 3.18 10E12/L (ref 4.4–5.9)
SODIUM SERPL-SCNC: 139 MMOL/L (ref 133–144)
TACROLIMUS BLD-MCNC: 7.9 UG/L (ref 5–15)
TME LAST DOSE: NORMAL H
TRIGL SERPL-MCNC: 104 MG/DL
WBC # BLD AUTO: 0 10E9/L (ref 4–11)

## 2021-02-21 PROCEDURE — 250N000009 HC RX 250: Performed by: PEDIATRICS

## 2021-02-21 PROCEDURE — 250N000011 HC RX IP 250 OP 636: Performed by: NURSE PRACTITIONER

## 2021-02-21 PROCEDURE — 250N000009 HC RX 250: Performed by: NURSE PRACTITIONER

## 2021-02-21 PROCEDURE — 250N000011 HC RX IP 250 OP 636: Performed by: PEDIATRICS

## 2021-02-21 PROCEDURE — 85025 COMPLETE CBC W/AUTO DIFF WBC: CPT | Performed by: NURSE PRACTITIONER

## 2021-02-21 PROCEDURE — 258N000003 HC RX IP 258 OP 636: Performed by: PEDIATRICS

## 2021-02-21 PROCEDURE — 250N000013 HC RX MED GY IP 250 OP 250 PS 637: Performed by: NURSE PRACTITIONER

## 2021-02-21 PROCEDURE — 206N000001 HC R&B BMT UMMC

## 2021-02-21 PROCEDURE — 258N000003 HC RX IP 258 OP 636: Performed by: NURSE PRACTITIONER

## 2021-02-21 PROCEDURE — 99233 SBSQ HOSP IP/OBS HIGH 50: CPT | Performed by: PEDIATRICS

## 2021-02-21 PROCEDURE — 80048 BASIC METABOLIC PNL TOTAL CA: CPT | Performed by: NURSE PRACTITIONER

## 2021-02-21 PROCEDURE — 250N000013 HC RX MED GY IP 250 OP 250 PS 637: Performed by: PEDIATRICS

## 2021-02-21 PROCEDURE — 85027 COMPLETE CBC AUTOMATED: CPT

## 2021-02-21 PROCEDURE — 80197 ASSAY OF TACROLIMUS: CPT | Performed by: NURSE PRACTITIONER

## 2021-02-21 PROCEDURE — 84478 ASSAY OF TRIGLYCERIDES: CPT | Performed by: PEDIATRICS

## 2021-02-21 PROCEDURE — 80180 DRUG SCRN QUAN MYCOPHENOLATE: CPT | Performed by: PEDIATRICS

## 2021-02-21 RX ORDER — MORPHINE SULFATE 2 MG/ML
2 INJECTION, SOLUTION INTRAMUSCULAR; INTRAVENOUS
Status: DISCONTINUED | OUTPATIENT
Start: 2021-02-21 | End: 2021-02-24

## 2021-02-21 RX ADMIN — URSODIOL 600 MG: 300 CAPSULE ORAL at 14:19

## 2021-02-21 RX ADMIN — I.V. FAT EMULSION 240 ML: 20 EMULSION INTRAVENOUS at 20:01

## 2021-02-21 RX ADMIN — MYCOPHENOLATE MOFETIL 960 MG: 500 INJECTION, POWDER, LYOPHILIZED, FOR SOLUTION INTRAVENOUS at 22:02

## 2021-02-21 RX ADMIN — ACYCLOVIR SODIUM 600 MG: 50 INJECTION, SOLUTION INTRAVENOUS at 08:05

## 2021-02-21 RX ADMIN — CEFEPIME 2 G: 2 INJECTION, POWDER, FOR SOLUTION INTRAVENOUS at 04:50

## 2021-02-21 RX ADMIN — MICAFUNGIN SODIUM 150 MG: 50 INJECTION, POWDER, LYOPHILIZED, FOR SOLUTION INTRAVENOUS at 10:41

## 2021-02-21 RX ADMIN — LORAZEPAM 1 MG: 2 INJECTION INTRAMUSCULAR; INTRAVENOUS at 03:37

## 2021-02-21 RX ADMIN — LORAZEPAM 1 MG: 2 INJECTION INTRAMUSCULAR; INTRAVENOUS at 22:02

## 2021-02-21 RX ADMIN — PANTOPRAZOLE SODIUM 40 MG: 40 TABLET, DELAYED RELEASE ORAL at 08:05

## 2021-02-21 RX ADMIN — ACYCLOVIR SODIUM 600 MG: 50 INJECTION, SOLUTION INTRAVENOUS at 00:24

## 2021-02-21 RX ADMIN — VANCOMYCIN HYDROCHLORIDE 125 MG: 125 CAPSULE ORAL at 20:03

## 2021-02-21 RX ADMIN — Medication 315 MCG: at 20:21

## 2021-02-21 RX ADMIN — ACYCLOVIR SODIUM 600 MG: 50 INJECTION, SOLUTION INTRAVENOUS at 16:05

## 2021-02-21 RX ADMIN — DEXTROSE MONOHYDRATE 0.02 MG/KG/DAY: 5 INJECTION, SOLUTION INTRAVENOUS at 20:20

## 2021-02-21 RX ADMIN — URSODIOL 600 MG: 300 CAPSULE ORAL at 08:05

## 2021-02-21 RX ADMIN — PANTOPRAZOLE SODIUM 40 MG: 40 TABLET, DELAYED RELEASE ORAL at 20:03

## 2021-02-21 RX ADMIN — URSODIOL 600 MG: 300 CAPSULE ORAL at 20:03

## 2021-02-21 RX ADMIN — SODIUM CHLORIDE 150 MG: 9 INJECTION, SOLUTION INTRAVENOUS at 12:19

## 2021-02-21 RX ADMIN — LORAZEPAM 1 MG: 2 INJECTION INTRAMUSCULAR; INTRAVENOUS at 10:25

## 2021-02-21 RX ADMIN — MYCOPHENOLATE MOFETIL 960 MG: 500 INJECTION, POWDER, LYOPHILIZED, FOR SOLUTION INTRAVENOUS at 14:00

## 2021-02-21 RX ADMIN — LORAZEPAM 1 MG: 2 INJECTION INTRAMUSCULAR; INTRAVENOUS at 16:42

## 2021-02-21 RX ADMIN — CEFEPIME 2 G: 2 INJECTION, POWDER, FOR SOLUTION INTRAVENOUS at 16:05

## 2021-02-21 RX ADMIN — VANCOMYCIN HYDROCHLORIDE 125 MG: 125 CAPSULE ORAL at 14:08

## 2021-02-21 RX ADMIN — POTASSIUM CHLORIDE: 2 INJECTION, SOLUTION, CONCENTRATE INTRAVENOUS at 20:01

## 2021-02-21 RX ADMIN — MYCOPHENOLATE MOFETIL 960 MG: 500 INJECTION, POWDER, LYOPHILIZED, FOR SOLUTION INTRAVENOUS at 06:05

## 2021-02-21 RX ADMIN — VANCOMYCIN HYDROCHLORIDE 125 MG: 125 CAPSULE ORAL at 08:05

## 2021-02-21 RX ADMIN — VANCOMYCIN HYDROCHLORIDE 125 MG: 125 CAPSULE ORAL at 18:04

## 2021-02-21 ASSESSMENT — MIFFLIN-ST. JEOR: SCORE: 1608.37

## 2021-02-21 NOTE — PROGRESS NOTES
Pediatric BMT Daily Progress Note    Interval Events: Isael remains afebrile and clinically stable. Morphine PCA bumps only started yesterday - used 2 and said it didn't help much with the pain.  C/o increased nausea and some abdominal discomfort noted - relief with benadyl. Weight decreased - on tpn.  Eating so much.     Review of Systems: Pertinent positives include those mentioned in interval events. A complete review of systems was performed and is otherwise negative.      Medications:  Please see MAR    Physical Exam:    Temp:  [98.7  F (37.1  C)-99.1  F (37.3  C)] 98.7  F (37.1  C)  Pulse:  [] 90  Resp:  [16-18] 16  BP: ()/(55-75) 100/59  SpO2:  [97 %-100 %] 97 %     I/O last 3 completed shifts:  In: 3217 [P.O.:240; I.V.:1032]  Out: 2985 [Urine:2425; Emesis/NG output:400; Stool:160]    GEN: Interactive, pleasant and cooperative, sitting in bed. NAD.  Mom present.   HEENT: Head NC/AT, PERRL, EOMs intact, sclerae clear, nares patent, MMM. Erythema and ridging of lateral tongue and interior cheeks without visible sores present.   CARD: RRR, no murmur.  RESP: Lungs clear to auscultation bilaterally. Normal work of breathing. .  ABD: Soft, NT, ND, NABS present, no organomegaly or masses noted.   EXTREM: WWP, MAEE  SKIN: Dry skin; Intact without erythema or rash on exposed skin surfaces  NEURO: No focal deficits  ACCESS: Double lumen burns, port (not accessed)    Labs: All reviewed with pertinent positives: Hgb 10.0, Plts 20k; BUN 15, Creat 0.52    Assessment and Plan: Artemio is a 19 yo male with history of multiply relapsed ALL undergoing conditioning/UCBT per 2013-09. Day +5, afebrile and clinically stable. C.Diff positive 2/17 for which he is on oral vancomycin. Increased pain and discomfort.     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 S/p preparative regimen per MT2013-09 with Thiotepa (days -7 trough -6), Fludarabine (days -5 through -3), Busulfan (days -5 through -3), ATG (days -4 through -2) followed by UCBT (2/16). Counts at liana.  - Disease (BMBx) and donor evaluations due day +21     # Risk for GVHD:   - Continue Tacrolimus, goal 10-15 through day +14. Level 9.1 yesterday,  with repeat level pending from today. (to noted we started Emend today and may effect future tacro levels in next due days ahead)   - Continue MMF-- levels due tonight.    FEN/Renal:  # Risk for malnutrition: intake minimal  - Regular diet as tolerated  - Dietician to follow  - Continue TPN/IL    # Electrolyte abnormalities:    - Check daily electrolytes   - Replace/adjust in TPN as indicated- hypokalemia and hypomagnesemia.  Both will be replaced as needed.      # Risk for renal dysfunction and fluid overload: GFR (1/28): 119 mls/min  - Monitor I/O's and daily weights     # Risk for aHUS/TA-TMA:    - Monitor LDH qMon/Thurs: 146 (2/18)  - Monitor urine protein/creatinine qTuesday     Pulmonary:  # Risk for pulmonary insufficiency: No current concerns    Cardiovascular:  # Asymptomatic WPW syndrome (diagnosed 2018): no interventions to date. Most recent EKG (1/27). ECHO (1/26) normal, EF 64%. 24h Zio patch holter (1/27-1/28) revealed c/w WPW with no ventricular ectopies present. Cardiology following.   - Obtain weekly BNP (uptrended to 664 2/16) and troponin (<0.015 2/16). Per cardiology, elevated BNP likely attributed to fluid shifts (threshold of concern >5000). Next due 2/23.  - Follow up ECHO in 6 mos (7/2021)     # Risk for hypertension secondary to medications: Closely monitor following admission.     Heme:   # Pancytopenia secondary to chemotherapy  - Transfuse for hemoglobin < 7, platelets < 10,000  - Hx of transfusion reactions (hives): Premedicate with tylenol and  benadryl prior to pRBCs and plts  - GCSF 5 mcg/kg IV daily to begin today day +5 per protocol and continue until ANC >/= 2500 x 3 consecutive days.     Infectious Disease:  # Fever in context of BMT conditioning and central access (2/13):  - Follow Blood cxs (NGTD). Of note, also has un-accessed port-a-cath.   - Continue empiric Cefepime through count recovery    # C.Diff (2/17): continue 10d course of oral vancomycin through 2/26    # Risk for infection given immunocompromised status with need for prophylaxis:  - Viral (CMV/HSV sero positive): HD Acyclovir. Weekly CMV neg 2/17.  - Fungal: Micafungin. Transition to either itraconazole or posaconazole when able.  - Bacterial: Cefepime as above  - PCP: Pentamidine given 2/15. Consider Bactrim next month if WBC criteria met (no hx of adverse reaction).     Past infections: C. Diff (November 2020)     GI:   # Reflux:  - BID protonix   - PRN Tums     # Nausea management:   - ativan q6h. finished 3d course of emend (2/16-2/18). Increased nausea today and will repeat 3 day course 2/21-2/23  - Benadryl prn     # Risk for VOD  - Ursodiol TID     # Loose stools:  - see C.Diff above  - maintain hydration     Neuro:  # Mucositis/abdominal pain:    - tylenol PRN   - 2/20 start morphine PCA with patient dosing only.  Was planning to increase dose today but patient doesn't want to wear pulse ox.  Therefore will plan for prn nurse admin boluses as needed.  Monitor use.    - encourage warm packs     # Seizure prophylaxis: s/p Keppra     # History of significant spinal headaches: consider using Candelario needle with lumbar punctures    # CRS history. 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 No CRS, neurotoxicity or infectious complications from [place] CAR-T.     Fertility: Sperm collected previously collected for fertility preservation     Discharge Considerations: Expected lengths of hospitalization  for patients undergoing stem cell transplantation vary by primary diagnosis, conditioning regimen, graft source, and development of complications. A typical stay is 6 weeks.    The above plan of care was developed by and communicated to me by the   Pediatric BMT attending physician, Viky Zavala MD.     Judy ADAMSON, CNP  Pediatric Nurse Practitioner  Pediatric Blood and Marrow Transplant  [phone] - Pager  [phone] - BMT workroom  [phone] - BMT Clinic        Pediatric BMT Attending Inpatient Note:  Artemio was seen and evaluated by me today.      The significant interval history includes more nausea.  Pain bumps did not seem to help.     I have reviewed changes and data from the last 24 hours, including medications, laboratory results, vital signs and pathology results.      I have formulated and discussed the plan with the BMT team. The relevant clinical topics addressed included the followin21 y/o M w/ h/o multiply relapsed Pre-B cell ALL (s/p MSD BMT, CART-19, CART-22) in CR3 here for consolidation of remission with MAC (FluBuTT ATG) S/P UCBT, risk for malnutrition- will start TPN, risk for nausea-finishing course of emend today, risk for VOD, risk for gastritis, risk for opportunistic infection on micafungin, valtrex and pentamidine and cefepime, now with C. Diff infection- on vancomycin, at risk of GVHD- on tacro- adjusting levels and MMF, risk of mucositis- morphine PRN, WPW, monitoring pro-BNP and troponins, recent Ziopatch per cards had no PACs or PVCs.  Replacing magnesium and potassium as needed. Will give another course of emend today and will increase PCA dose.        I discussed the course and plan with the patient/family and answered all of their questions to the best of my ability.     My care coordination activities today include oversight of planned lab studies, oversight of medication changes and discussion with BMT team-members.      My total floor time today was at  least 40 minutes, greater than 50% of which was counseling and coordination of care.    Viky Zavala MD, PhD    Pediatric Blood and Marrow Transplant  [facility]                  Patient Active Problem List   Diagnosis     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)     Aaron-Parkinson-White (WPW) syndrome     Bone marrow transplant candidate     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)     At risk for infection     S/P allogeneic bone marrow transplant (H)     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     Examination of participant or control in clinical research

## 2021-02-21 NOTE — PLAN OF CARE
Isael has been af, other vs stable, lung sounds clear. No complaints of nausea. Ate a bowl of mac and cheese last evening. No stool. Good UOP. Received benadryl x1. Took 1 bump from morphine pca overnight. No replacements needed.  POC reviewed, continue to monitor and notify MD with changes. Hourly rounding complete.

## 2021-02-21 NOTE — PLAN OF CARE
Afebrile, VSS. No complaints of nausea, but had one emesis of undigested food, but not feeling nauseated beforehand. Did start a morphine pca and he took one bump after the emesis, otherwise he rested well today without complaints. Took a shower and did some cares, but otherwise stayed in bed most of the day other than to use the bathroom for bowel movements which were less frequent and less watery today. Not overly receptive to advice to get up and move around room. Continue to encourage activity.

## 2021-02-21 NOTE — PLAN OF CARE
Afebrile, VSS. Eating cereal and taking pills without complaints. Given an IV dose of Emend and no nausea/emesis today. No mouth sores seen and no complaints of pain. Did not want wish to wear continuous pulse oximetry per order for his prn pca doses, so pca discontinued. One large loose stool today. Resting in bed and playing video games. Continue with plan of care.

## 2021-02-22 ENCOUNTER — APPOINTMENT (OUTPATIENT)
Dept: ULTRASOUND IMAGING | Facility: CLINIC | Age: 21
DRG: 014 | End: 2021-02-22
Attending: PEDIATRICS
Payer: COMMERCIAL

## 2021-02-22 ENCOUNTER — APPOINTMENT (OUTPATIENT)
Dept: PHYSICAL THERAPY | Facility: CLINIC | Age: 21
DRG: 014 | End: 2021-02-22
Attending: PEDIATRICS
Payer: COMMERCIAL

## 2021-02-22 LAB
ALBUMIN SERPL-MCNC: 3 G/DL (ref 3.4–5)
ALP SERPL-CCNC: 46 U/L (ref 40–150)
ALT SERPL W P-5'-P-CCNC: 32 U/L (ref 0–70)
ANION GAP SERPL CALCULATED.3IONS-SCNC: 6 MMOL/L (ref 3–14)
AST SERPL W P-5'-P-CCNC: 17 U/L (ref 0–45)
BILIRUB DIRECT SERPL-MCNC: 0.2 MG/DL (ref 0–0.2)
BILIRUB SERPL-MCNC: 0.7 MG/DL (ref 0.2–1.3)
BUN SERPL-MCNC: 14 MG/DL (ref 7–30)
CALCIUM SERPL-MCNC: 8.6 MG/DL (ref 8.5–10.1)
CHLORIDE SERPL-SCNC: 109 MMOL/L (ref 94–109)
CO2 SERPL-SCNC: 23 MMOL/L (ref 20–32)
CREAT SERPL-MCNC: 0.42 MG/DL (ref 0.66–1.25)
DIFFERENTIAL METHOD BLD: ABNORMAL
ERYTHROCYTE [DISTWIDTH] IN BLOOD BY AUTOMATED COUNT: 10.8 % (ref 10–15)
FIBRINOGEN PPP-MCNC: 459 MG/DL (ref 200–420)
GFR SERPL CREATININE-BSD FRML MDRD: >90 ML/MIN/{1.73_M2}
GLUCOSE SERPL-MCNC: 113 MG/DL (ref 70–99)
HCT VFR BLD AUTO: 26.7 % (ref 40–53)
HGB BLD-MCNC: 9.3 G/DL (ref 13.3–17.7)
INR PPP: 1.04 (ref 0.86–1.14)
LDH SERPL L TO P-CCNC: 141 U/L (ref 85–227)
MAGNESIUM SERPL-MCNC: 1.4 MG/DL (ref 1.6–2.3)
MAGNESIUM SERPL-MCNC: 2 MG/DL (ref 1.6–2.3)
MCH RBC QN AUTO: 31.3 PG (ref 26.5–33)
MCHC RBC AUTO-ENTMCNC: 34.8 G/DL (ref 31.5–36.5)
MCV RBC AUTO: 90 FL (ref 78–100)
PHOSPHATE SERPL-MCNC: 3.9 MG/DL (ref 2.5–4.5)
PLATELET # BLD AUTO: 14 10E9/L (ref 150–450)
POTASSIUM SERPL-SCNC: 3.9 MMOL/L (ref 3.4–5.3)
PREALB SERPL IA-MCNC: 33 MG/DL (ref 15–45)
PROT SERPL-MCNC: 5.8 G/DL (ref 6.8–8.8)
RBC # BLD AUTO: 2.97 10E12/L (ref 4.4–5.9)
SODIUM SERPL-SCNC: 138 MMOL/L (ref 133–144)
SPECIMEN SOURCE: ABNORMAL
TACROLIMUS BLD-MCNC: 8 UG/L (ref 5–15)
TME LAST DOSE: NORMAL H
WBC # BLD AUTO: 0 10E9/L (ref 4–11)
YEAST SPEC QL CULT: ABNORMAL

## 2021-02-22 PROCEDURE — 250N000013 HC RX MED GY IP 250 OP 250 PS 637: Performed by: PEDIATRICS

## 2021-02-22 PROCEDURE — 250N000011 HC RX IP 250 OP 636: Performed by: NURSE PRACTITIONER

## 2021-02-22 PROCEDURE — 258N000003 HC RX IP 258 OP 636: Performed by: NURSE PRACTITIONER

## 2021-02-22 PROCEDURE — 250N000011 HC RX IP 250 OP 636: Performed by: PEDIATRICS

## 2021-02-22 PROCEDURE — 84134 ASSAY OF PREALBUMIN: CPT | Performed by: NURSE PRACTITIONER

## 2021-02-22 PROCEDURE — 83735 ASSAY OF MAGNESIUM: CPT | Performed by: NURSE PRACTITIONER

## 2021-02-22 PROCEDURE — 85027 COMPLETE CBC AUTOMATED: CPT

## 2021-02-22 PROCEDURE — 93975 VASCULAR STUDY: CPT | Mod: 26 | Performed by: RADIOLOGY

## 2021-02-22 PROCEDURE — 250N000013 HC RX MED GY IP 250 OP 250 PS 637: Performed by: NURSE PRACTITIONER

## 2021-02-22 PROCEDURE — 84100 ASSAY OF PHOSPHORUS: CPT | Performed by: NURSE PRACTITIONER

## 2021-02-22 PROCEDURE — 80053 COMPREHEN METABOLIC PANEL: CPT | Performed by: NURSE PRACTITIONER

## 2021-02-22 PROCEDURE — 80197 ASSAY OF TACROLIMUS: CPT | Performed by: NURSE PRACTITIONER

## 2021-02-22 PROCEDURE — 99233 SBSQ HOSP IP/OBS HIGH 50: CPT | Performed by: PEDIATRICS

## 2021-02-22 PROCEDURE — 76700 US EXAM ABDOM COMPLETE: CPT

## 2021-02-22 PROCEDURE — 250N000009 HC RX 250: Performed by: NURSE PRACTITIONER

## 2021-02-22 PROCEDURE — 83735 ASSAY OF MAGNESIUM: CPT | Performed by: PEDIATRICS

## 2021-02-22 PROCEDURE — 250N000009 HC RX 250: Performed by: PEDIATRICS

## 2021-02-22 PROCEDURE — 91200 LIVER ELASTOGRAPHY: CPT | Mod: TC

## 2021-02-22 PROCEDURE — 82248 BILIRUBIN DIRECT: CPT | Performed by: NURSE PRACTITIONER

## 2021-02-22 PROCEDURE — 206N000001 HC R&B BMT UMMC

## 2021-02-22 PROCEDURE — 85049 AUTOMATED PLATELET COUNT: CPT | Performed by: NURSE PRACTITIONER

## 2021-02-22 PROCEDURE — 85384 FIBRINOGEN ACTIVITY: CPT | Performed by: NURSE PRACTITIONER

## 2021-02-22 PROCEDURE — 83615 LACTATE (LD) (LDH) ENZYME: CPT | Performed by: NURSE PRACTITIONER

## 2021-02-22 PROCEDURE — 97110 THERAPEUTIC EXERCISES: CPT | Mod: GP | Performed by: PHYSICAL THERAPIST

## 2021-02-22 PROCEDURE — 258N000003 HC RX IP 258 OP 636: Performed by: PEDIATRICS

## 2021-02-22 PROCEDURE — 85610 PROTHROMBIN TIME: CPT | Performed by: NURSE PRACTITIONER

## 2021-02-22 RX ADMIN — VANCOMYCIN HYDROCHLORIDE 125 MG: 125 CAPSULE ORAL at 15:54

## 2021-02-22 RX ADMIN — Medication 315 MCG: at 19:37

## 2021-02-22 RX ADMIN — VANCOMYCIN HYDROCHLORIDE 125 MG: 125 CAPSULE ORAL at 19:46

## 2021-02-22 RX ADMIN — VANCOMYCIN HYDROCHLORIDE 125 MG: 125 CAPSULE ORAL at 11:21

## 2021-02-22 RX ADMIN — MICAFUNGIN SODIUM 150 MG: 50 INJECTION, POWDER, LYOPHILIZED, FOR SOLUTION INTRAVENOUS at 10:14

## 2021-02-22 RX ADMIN — ACYCLOVIR SODIUM 600 MG: 50 INJECTION, SOLUTION INTRAVENOUS at 23:48

## 2021-02-22 RX ADMIN — PANTOPRAZOLE SODIUM 40 MG: 40 TABLET, DELAYED RELEASE ORAL at 19:46

## 2021-02-22 RX ADMIN — CEFEPIME 2 G: 2 INJECTION, POWDER, FOR SOLUTION INTRAVENOUS at 20:10

## 2021-02-22 RX ADMIN — SODIUM CHLORIDE 81 MG: 9 INJECTION, SOLUTION INTRAVENOUS at 08:28

## 2021-02-22 RX ADMIN — LORAZEPAM 1 MG: 2 INJECTION INTRAMUSCULAR; INTRAVENOUS at 10:14

## 2021-02-22 RX ADMIN — URSODIOL 600 MG: 300 CAPSULE ORAL at 19:45

## 2021-02-22 RX ADMIN — MYCOPHENOLATE MOFETIL 960 MG: 500 INJECTION, POWDER, LYOPHILIZED, FOR SOLUTION INTRAVENOUS at 06:13

## 2021-02-22 RX ADMIN — LORAZEPAM 1 MG: 2 INJECTION INTRAMUSCULAR; INTRAVENOUS at 15:54

## 2021-02-22 RX ADMIN — PANTOPRAZOLE SODIUM 40 MG: 40 TABLET, DELAYED RELEASE ORAL at 08:29

## 2021-02-22 RX ADMIN — MYCOPHENOLATE MOFETIL 960 MG: 500 INJECTION, POWDER, LYOPHILIZED, FOR SOLUTION INTRAVENOUS at 14:03

## 2021-02-22 RX ADMIN — ACYCLOVIR SODIUM 600 MG: 50 INJECTION, SOLUTION INTRAVENOUS at 01:02

## 2021-02-22 RX ADMIN — MYCOPHENOLATE MOFETIL 960 MG: 500 INJECTION, POWDER, LYOPHILIZED, FOR SOLUTION INTRAVENOUS at 21:44

## 2021-02-22 RX ADMIN — MAGNESIUM SULFATE HEPTAHYDRATE 3000 MG: 500 INJECTION, SOLUTION INTRAMUSCULAR; INTRAVENOUS at 04:35

## 2021-02-22 RX ADMIN — CEFEPIME 2 G: 2 INJECTION, POWDER, FOR SOLUTION INTRAVENOUS at 13:20

## 2021-02-22 RX ADMIN — LORAZEPAM 1 MG: 2 INJECTION INTRAMUSCULAR; INTRAVENOUS at 04:35

## 2021-02-22 RX ADMIN — ACYCLOVIR SODIUM 600 MG: 50 INJECTION, SOLUTION INTRAVENOUS at 08:28

## 2021-02-22 RX ADMIN — URSODIOL 600 MG: 300 CAPSULE ORAL at 08:29

## 2021-02-22 RX ADMIN — DEXTROSE MONOHYDRATE 0.03 MG/KG/DAY: 5 INJECTION, SOLUTION INTRAVENOUS at 19:44

## 2021-02-22 RX ADMIN — POTASSIUM CHLORIDE: 2 INJECTION, SOLUTION, CONCENTRATE INTRAVENOUS at 19:40

## 2021-02-22 RX ADMIN — URSODIOL 600 MG: 300 CAPSULE ORAL at 14:03

## 2021-02-22 RX ADMIN — LORAZEPAM 1 MG: 2 INJECTION INTRAMUSCULAR; INTRAVENOUS at 21:42

## 2021-02-22 RX ADMIN — CEFEPIME 2 G: 2 INJECTION, POWDER, FOR SOLUTION INTRAVENOUS at 00:13

## 2021-02-22 RX ADMIN — VANCOMYCIN HYDROCHLORIDE 125 MG: 125 CAPSULE ORAL at 08:29

## 2021-02-22 RX ADMIN — CEFEPIME 2 G: 2 INJECTION, POWDER, FOR SOLUTION INTRAVENOUS at 05:40

## 2021-02-22 RX ADMIN — ACYCLOVIR SODIUM 600 MG: 50 INJECTION, SOLUTION INTRAVENOUS at 15:54

## 2021-02-22 RX ADMIN — I.V. FAT EMULSION 240 ML: 20 EMULSION INTRAVENOUS at 19:40

## 2021-02-22 ASSESSMENT — MIFFLIN-ST. JEOR: SCORE: 1613.37

## 2021-02-22 NOTE — PROGRESS NOTES
Pediatric BMT Daily Progress Note    Interval Events: Isael remains afebrile and clinically stable. Morphine PCA discontinued as Ash feels pain is manageable with nurse administered morphine, no use of morphine in last 24 hours. New onset of rash on upper chest and back of hands, raised pink to red scattered rash.       Review of Systems: Pertinent positives include those mentioned in interval events. A complete review of systems was performed and is otherwise negative.      Medications:  Please see MAR    Physical Exam:    Temp:  [98.2  F (36.8  C)-99  F (37.2  C)] 98.5  F (36.9  C)  Pulse:  [] 97  Resp:  [16-18] 16  BP: ()/(58-77) 109/77  SpO2:  [98 %-100 %] 100 %     I/O last 3 completed shifts:  In: 3566 [P.O.:120; I.V.:1501]  Out: 2946 [Urine:2596; Stool:350]    GEN: Interactive, pleasant and cooperative, lying in bed.  NAD.  Mom present.   HEENT: Head NC/AT, PERRL, EOMs intact, sclerae clear, nares patent, MMM. Erythema and ridging of lateral tongue and interior cheeks without visible sores present.   CARD: RRR, no murmur.  RESP: Lungs clear to auscultation bilaterally. Normal work of breathing. .  ABD: Soft, NT, ND, NABS present, no organomegaly or masses noted.   EXTREM: WWP, MAEE  SKIN: Dry skin; new onset erythema on back of hands and chest   NEURO: No focal deficits  ACCESS: Double lumen burns, port (not accessed)    Labs: All reviewed with pertinent positives: Hgb 9.8  Plts 14k; BUN 14 Creat 0.42    Assessment and Plan: Artemio is a 21 yo male with history of multiply relapsed ALL undergoing conditioning/UCBT per 2013-09. Day +6, afebrile and clinically stable. C.Diff positive 2/17 for which he is on oral vancomycin.  Monitoring for expected mucositis pain.     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 S/p preparative regimen per [id number] with Thiotepa (days -7 trough -6), Fludarabine (days -5 through -3), Busulfan (days -5 through -3), ATG (days -4 through -2) followed by UCBT (2/16). Counts at liana.  - Disease (BMBx) and donor evaluations due day +21     # Risk for GVHD:   - Continue Tacrolimus, goal 10-15 through day +14. Level 7.9 yesterday, with repeat level pending from today. (to noted we started Emend today and may effect future tacro levels in next due days ahead)   - Continue MMF-- levels due tonight.    FEN/Renal:  # Risk for malnutrition: intake minimal  - Regular diet as tolerated  - Dietician to follow  - Continue TPN/IL    # Electrolyte abnormalities:    - Check daily electrolytes   - Replace/adjust in TPN as indicated- hypokalemia and hypomagnesemia.  Both will be replaced as needed.      # Risk for renal dysfunction and fluid overload: GFR (1/28): 119 mls/min  - Monitor I/O's and daily weights     # Risk for aHUS/TA-TMA:    - Monitor LDH qMon/Thurs: 141 (2/22)  - Monitor urine protein/creatinine qTuesday     Pulmonary:  # Risk for pulmonary insufficiency: No current concerns    Cardiovascular:  # Asymptomatic WPW syndrome (diagnosed 2018): no interventions to date. Most recent EKG (1/27). ECHO (1/26) normal, EF 64%. 24h Zio patch holter (1/27-1/28) revealed c/w WPW with no ventricular ectopies present. Cardiology following.   - Obtain weekly BNP (uptrended to 664 2/16) and troponin (<0.015 2/16). Per cardiology, elevated BNP likely attributed to fluid shifts (threshold of concern >5000). Next due 2/23.  - Follow up ECHO in 6 mos (7/2021)     # Risk for hypertension secondary to medications: Closely monitor following admission.     Heme:   # Pancytopenia secondary to chemotherapy  - Transfuse for hemoglobin < 7, platelets < 10,000  - Hx of transfusion reactions (hives): Premedicate with tylenol and benadryl prior to pRBCs  and plts  - GCSF 5 mcg/kg IV daily to begin today day +5 per protocol and continue until ANC >/= 2500 x 3 consecutive days.     Infectious Disease:  # Fever in context of BMT conditioning and central access (2/13):  - Follow Blood cxs (NGTD). Of note, also has un-accessed port-a-cath.   - Continue empiric Cefepime through count recovery    # C.Diff (2/17): continue 10d course of oral vancomycin through 2/26    # Risk for infection given immunocompromised status with need for prophylaxis:  - Viral (CMV/HSV sero positive): HD Acyclovir. Weekly CMV neg 2/17.  - Fungal: Micafungin. Transition to either itraconazole or posaconazole when able.  - Bacterial: Cefepime as above  - PCP: Pentamidine given 2/15. Consider Bactrim next month if WBC criteria met (no hx of adverse reaction).     Past infections: C. Diff (November 2020)     GI:   # Reflux:  - BID protonix   - PRN Tums     # Nausea management:   - ativan q6h. finished 3d course of emend (2/16-2/18). Increased nausea today and will repeat 3 day course 2/21-2/23  - Benadryl prn     # Risk for VOD  - Ursodiol TID     # Loose stools:  - see C.Diff above- stools with some substance reported as twice a day  - maintain hydration    # IMPRESSION:   1. Stable mild hepatomegaly.  2. Otherwise normal exam, including patent Doppler evaluation.        Neuro:  # Mucositis/abdominal pain:    - tylenol PRN   - 2/20 start morphine PCA , discontinued as Ash feels pain is manageable and does not want to wear pulse ox.   Therefore will plan for prn nurse admin boluses as needed.  Monitor use.    - encourage warm packs     # Seizure prophylaxis: s/p Keppra     # History of significant spinal headaches: consider using Candelario needle with lumbar punctures    # CRS history. 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 No CRS, neurotoxicity or infectious complications from [place] CAR-T. Continue Q 2 weeks  IGG levels      Fertility: Sperm collected previously collected for fertility preservation     Discharge Considerations: Expected lengths of hospitalization for patients undergoing stem cell transplantation vary by primary diagnosis, conditioning regimen, graft source, and development of complications. A typical stay is 6 weeks.    The above plan of care was developed by and communicated to me by the   Pediatric BMT attending physician, Viky Zaavla MD.      Lilia Prabhakar MSN, [facility]-  Pediatric Blood and Marrow Transplant Program  [facility] [phone]  Pager [phone]      Pediatric BMT Attending Inpatient Note:  Artemio was seen and evaluated by me today.      The significant interval history includes did not want the PCA because he did not want to have the pulse oximetry on.  Ate a good breakfast.  Diarrhea the same although has more substance.     I have reviewed changes and data from the last 24 hours, including medications, laboratory results, vital signs and pathology results.      I have formulated and discussed the plan with the BMT team. The relevant clinical topics addressed included the followin19 y/o M w/ h/o multiply relapsed Pre-B cell ALL (s/p MSD BMT, CART-19, CART-22) in CR3 here for consolidation of remission with MAC (FluBuTT ATG) S/P UCBT, risk for malnutrition- TPN, risk for nausea-on second course of emend, risk for VOD, risk for gastritis, risk for opportunistic infection on micafungin, valtrex and pentamidine and cefepime, now with C. Diff infection- on vancomycin, at risk of GVHD- on tacro- adjusting levels and MMF, risk of mucositis- morphine PRN, WPW, monitoring pro-BNP and troponins, recent Ziopatch per cards had no PACs or PVCs.  Replacing magnesium and potassium as needed.          I discussed the course and plan with the patient/family and answered all of their questions to the best of my ability.     My care coordination activities today include oversight of planned  lab studies, oversight of medication changes and discussion with BMT team-members.      My total floor time today was at least 40 minutes, greater than 50% of which was counseling and coordination of care.    Viky Zavala MD, PhD    Pediatric Blood and Marrow Transplant  [facility]           Patient Active Problem List   Diagnosis     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)     Aaron-Parkinson-White (WPW) syndrome     Bone marrow transplant candidate     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)     At risk for infection     S/P allogeneic bone marrow transplant (H)     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     Examination of participant or control in clinical research

## 2021-02-22 NOTE — PLAN OF CARE
VS stable, lungs clear, denies any pain or nausea during shift. Hourly rounding done, plan of care continued.

## 2021-02-23 LAB
ANION GAP SERPL CALCULATED.3IONS-SCNC: 5 MMOL/L (ref 3–14)
AT III ACT/NOR PPP CHRO: 104 % (ref 85–135)
BLD PROD TYP BPU: NORMAL
BLD PROD TYP BPU: NORMAL
BLD UNIT ID BPU: 0
BLOOD PRODUCT CODE: NORMAL
BPU ID: NORMAL
BUN SERPL-MCNC: 15 MG/DL (ref 7–30)
CALCIUM SERPL-MCNC: 9.1 MG/DL (ref 8.5–10.1)
CHLORIDE SERPL-SCNC: 106 MMOL/L (ref 94–109)
CO2 SERPL-SCNC: 26 MMOL/L (ref 20–32)
COLLECT TME BLD: NORMAL HOUR
CREAT SERPL-MCNC: 0.55 MG/DL (ref 0.66–1.25)
CREAT UR-MCNC: 47 MG/DL
DIFFERENTIAL METHOD BLD: ABNORMAL
ERYTHROCYTE [DISTWIDTH] IN BLOOD BY AUTOMATED COUNT: 10.6 % (ref 10–15)
FREE MYPA LEVEL: 1 UG/L
FREE MYPA LEVEL: 1.1 UG/L
FREE MYPA LEVEL: 1.2 UG/L
FREE MYPA LEVEL: 6.2 UG/L
FREE MYPA LEVEL: 97.3 UG/L
GFR SERPL CREATININE-BSD FRML MDRD: >90 ML/MIN/{1.73_M2}
GLUCOSE SERPL-MCNC: 109 MG/DL (ref 70–99)
HCT VFR BLD AUTO: 24.4 % (ref 40–53)
HGB BLD-MCNC: 8.9 G/DL (ref 13.3–17.7)
MCH RBC QN AUTO: 31 PG (ref 26.5–33)
MCHC RBC AUTO-ENTMCNC: 36.5 G/DL (ref 31.5–36.5)
MCV RBC AUTO: 85 FL (ref 78–100)
NT-PROBNP SERPL-MCNC: 20 PG/ML (ref 0–450)
NUM BPU REQUESTED: 1
PLATELET # BLD AUTO: 30 10E9/L (ref 150–450)
PLATELET # BLD AUTO: 8 10E9/L (ref 150–450)
POTASSIUM SERPL-SCNC: 4.1 MMOL/L (ref 3.4–5.3)
PROT C ACT/NOR PPP CHRO: 70 % (ref 70–170)
PROT UR-MCNC: 0.11 G/L
PROT/CREAT 24H UR: 0.23 G/G CR (ref 0–0.2)
RBC # BLD AUTO: 2.87 10E12/L (ref 4.4–5.9)
SODIUM SERPL-SCNC: 137 MMOL/L (ref 133–144)
TACROLIMUS BLD-MCNC: 8.3 UG/L (ref 5–15)
TME LAST DOSE: NORMAL H
TRANSFUSION STATUS PATIENT QL: NORMAL
TRANSFUSION STATUS PATIENT QL: NORMAL
TROPONIN I SERPL-MCNC: <0.015 UG/L (ref 0–0.04)
WBC # BLD AUTO: 0 10E9/L (ref 4–11)

## 2021-02-23 PROCEDURE — 250N000009 HC RX 250: Performed by: NURSE PRACTITIONER

## 2021-02-23 PROCEDURE — 86900 BLOOD TYPING SEROLOGIC ABO: CPT | Performed by: PEDIATRICS

## 2021-02-23 PROCEDURE — 86850 RBC ANTIBODY SCREEN: CPT | Performed by: PEDIATRICS

## 2021-02-23 PROCEDURE — 83880 ASSAY OF NATRIURETIC PEPTIDE: CPT | Performed by: NURSE PRACTITIONER

## 2021-02-23 PROCEDURE — 84484 ASSAY OF TROPONIN QUANT: CPT | Performed by: NURSE PRACTITIONER

## 2021-02-23 PROCEDURE — 85027 COMPLETE CBC AUTOMATED: CPT

## 2021-02-23 PROCEDURE — 86922 COMPATIBILITY TEST ANTIGLOB: CPT | Performed by: PEDIATRICS

## 2021-02-23 PROCEDURE — 250N000011 HC RX IP 250 OP 636: Performed by: PEDIATRICS

## 2021-02-23 PROCEDURE — 85300 ANTITHROMBIN III ACTIVITY: CPT | Performed by: PEDIATRICS

## 2021-02-23 PROCEDURE — 87081 CULTURE SCREEN ONLY: CPT | Performed by: PEDIATRICS

## 2021-02-23 PROCEDURE — 85303 CLOT INHIBIT PROT C ACTIVITY: CPT | Performed by: PEDIATRICS

## 2021-02-23 PROCEDURE — 86901 BLOOD TYPING SEROLOGIC RH(D): CPT | Performed by: PEDIATRICS

## 2021-02-23 PROCEDURE — 85245 CLOT FACTOR VIII VW RISTOCTN: CPT | Performed by: PEDIATRICS

## 2021-02-23 PROCEDURE — P9037 PLATE PHERES LEUKOREDU IRRAD: HCPCS | Performed by: PEDIATRICS

## 2021-02-23 PROCEDURE — 80197 ASSAY OF TACROLIMUS: CPT | Performed by: NURSE PRACTITIONER

## 2021-02-23 PROCEDURE — 80048 BASIC METABOLIC PNL TOTAL CA: CPT | Performed by: NURSE PRACTITIONER

## 2021-02-23 PROCEDURE — 250N000011 HC RX IP 250 OP 636: Performed by: NURSE PRACTITIONER

## 2021-02-23 PROCEDURE — 250N000013 HC RX MED GY IP 250 OP 250 PS 637: Performed by: NURSE PRACTITIONER

## 2021-02-23 PROCEDURE — 258N000003 HC RX IP 258 OP 636: Performed by: PEDIATRICS

## 2021-02-23 PROCEDURE — 250N000013 HC RX MED GY IP 250 OP 250 PS 637: Performed by: PEDIATRICS

## 2021-02-23 PROCEDURE — 258N000003 HC RX IP 258 OP 636: Performed by: NURSE PRACTITIONER

## 2021-02-23 PROCEDURE — 85049 AUTOMATED PLATELET COUNT: CPT | Performed by: NURSE PRACTITIONER

## 2021-02-23 PROCEDURE — 250N000009 HC RX 250: Performed by: PEDIATRICS

## 2021-02-23 PROCEDURE — 99233 SBSQ HOSP IP/OBS HIGH 50: CPT | Performed by: PEDIATRICS

## 2021-02-23 PROCEDURE — 84156 ASSAY OF PROTEIN URINE: CPT | Performed by: PEDIATRICS

## 2021-02-23 PROCEDURE — C9113 INJ PANTOPRAZOLE SODIUM, VIA: HCPCS | Performed by: NURSE PRACTITIONER

## 2021-02-23 PROCEDURE — 206N000001 HC R&B BMT UMMC

## 2021-02-23 PROCEDURE — 258N000001 HC RX 258: Performed by: NURSE PRACTITIONER

## 2021-02-23 RX ORDER — OXYMETAZOLINE HYDROCHLORIDE 0.05 G/100ML
2 SPRAY NASAL 2 TIMES DAILY
Status: DISPENSED | OUTPATIENT
Start: 2021-02-23 | End: 2021-02-26

## 2021-02-23 RX ORDER — NYSTATIN 100000 U/G
OINTMENT TOPICAL 2 TIMES DAILY
Status: DISCONTINUED | OUTPATIENT
Start: 2021-02-23 | End: 2021-03-08

## 2021-02-23 RX ORDER — GLY/CARB H.POLYMR A/POT HYDROX
5-10 SOLUTION, ORAL MUCOUS MEMBRANE 4 TIMES DAILY
Status: DISCONTINUED | OUTPATIENT
Start: 2021-02-23 | End: 2021-02-28

## 2021-02-23 RX ADMIN — Medication 10 ML: at 16:36

## 2021-02-23 RX ADMIN — MICAFUNGIN SODIUM 50 MG: 50 INJECTION, POWDER, LYOPHILIZED, FOR SOLUTION INTRAVENOUS at 10:23

## 2021-02-23 RX ADMIN — SODIUM CHLORIDE 81 MG: 9 INJECTION, SOLUTION INTRAVENOUS at 08:32

## 2021-02-23 RX ADMIN — POTASSIUM CHLORIDE: 2 INJECTION, SOLUTION, CONCENTRATE INTRAVENOUS at 20:03

## 2021-02-23 RX ADMIN — LORAZEPAM 1 MG: 2 INJECTION INTRAMUSCULAR; INTRAVENOUS at 03:28

## 2021-02-23 RX ADMIN — VANCOMYCIN HYDROCHLORIDE 125 MG: 125 CAPSULE ORAL at 09:02

## 2021-02-23 RX ADMIN — LORAZEPAM 1 MG: 2 INJECTION INTRAMUSCULAR; INTRAVENOUS at 22:08

## 2021-02-23 RX ADMIN — Medication 10 ML: at 11:58

## 2021-02-23 RX ADMIN — MORPHINE SULFATE 2 MG: 2 INJECTION, SOLUTION INTRAMUSCULAR; INTRAVENOUS at 19:51

## 2021-02-23 RX ADMIN — Medication 5 ML: at 20:01

## 2021-02-23 RX ADMIN — URSODIOL 600 MG: 300 CAPSULE ORAL at 13:44

## 2021-02-23 RX ADMIN — VANCOMYCIN HYDROCHLORIDE 125 MG: 125 CAPSULE ORAL at 19:51

## 2021-02-23 RX ADMIN — ACYCLOVIR SODIUM 600 MG: 50 INJECTION, SOLUTION INTRAVENOUS at 16:35

## 2021-02-23 RX ADMIN — DEXTROSE MONOHYDRATE 0.03 MG/KG/DAY: 5 INJECTION, SOLUTION INTRAVENOUS at 21:34

## 2021-02-23 RX ADMIN — DEXTROSE AND SODIUM CHLORIDE: 5; 450 INJECTION, SOLUTION INTRAVENOUS at 21:41

## 2021-02-23 RX ADMIN — OXYMETAZOLINE HYDROCHLORIDE 2 SPRAY: 0.05 SPRAY NASAL at 20:01

## 2021-02-23 RX ADMIN — ACETAMINOPHEN 500 MG: 500 TABLET, FILM COATED ORAL at 02:49

## 2021-02-23 RX ADMIN — CEFEPIME 2 G: 2 INJECTION, POWDER, FOR SOLUTION INTRAVENOUS at 21:34

## 2021-02-23 RX ADMIN — MYCOPHENOLATE MOFETIL 960 MG: 500 INJECTION, POWDER, LYOPHILIZED, FOR SOLUTION INTRAVENOUS at 05:56

## 2021-02-23 RX ADMIN — CEFEPIME 2 G: 2 INJECTION, POWDER, FOR SOLUTION INTRAVENOUS at 13:44

## 2021-02-23 RX ADMIN — VANCOMYCIN HYDROCHLORIDE 125 MG: 125 CAPSULE ORAL at 16:35

## 2021-02-23 RX ADMIN — LORAZEPAM 1 MG: 2 INJECTION INTRAMUSCULAR; INTRAVENOUS at 10:03

## 2021-02-23 RX ADMIN — I.V. FAT EMULSION 240 ML: 20 EMULSION INTRAVENOUS at 20:03

## 2021-02-23 RX ADMIN — VANCOMYCIN HYDROCHLORIDE 125 MG: 125 CAPSULE ORAL at 11:58

## 2021-02-23 RX ADMIN — URSODIOL 600 MG: 300 CAPSULE ORAL at 09:03

## 2021-02-23 RX ADMIN — PANTOPRAZOLE SODIUM 40 MG: 40 INJECTION, POWDER, FOR SOLUTION INTRAVENOUS at 22:08

## 2021-02-23 RX ADMIN — MYCOPHENOLATE MOFETIL 960 MG: 500 INJECTION, POWDER, LYOPHILIZED, FOR SOLUTION INTRAVENOUS at 14:35

## 2021-02-23 RX ADMIN — NYSTATIN: 100000 OINTMENT TOPICAL at 11:58

## 2021-02-23 RX ADMIN — NYSTATIN: 100000 OINTMENT TOPICAL at 20:16

## 2021-02-23 RX ADMIN — ACYCLOVIR SODIUM 600 MG: 50 INJECTION, SOLUTION INTRAVENOUS at 08:32

## 2021-02-23 RX ADMIN — PANTOPRAZOLE SODIUM 40 MG: 40 TABLET, DELAYED RELEASE ORAL at 09:02

## 2021-02-23 RX ADMIN — MYCOPHENOLATE MOFETIL 960 MG: 500 INJECTION, POWDER, LYOPHILIZED, FOR SOLUTION INTRAVENOUS at 22:09

## 2021-02-23 RX ADMIN — LORAZEPAM 1 MG: 2 INJECTION INTRAMUSCULAR; INTRAVENOUS at 16:35

## 2021-02-23 RX ADMIN — CEFEPIME 2 G: 2 INJECTION, POWDER, FOR SOLUTION INTRAVENOUS at 04:31

## 2021-02-23 RX ADMIN — Medication 315 MCG: at 21:00

## 2021-02-23 RX ADMIN — OXYMETAZOLINE HYDROCHLORIDE 2 SPRAY: 0.05 SPRAY NASAL at 11:58

## 2021-02-23 RX ADMIN — DIPHENHYDRAMINE HYDROCHLORIDE 50 MG: 50 INJECTION, SOLUTION INTRAMUSCULAR; INTRAVENOUS at 02:49

## 2021-02-23 RX ADMIN — URSODIOL 600 MG: 300 CAPSULE ORAL at 19:51

## 2021-02-23 ASSESSMENT — MIFFLIN-ST. JEOR: SCORE: 1607.37

## 2021-02-23 NOTE — PLAN OF CARE
Isael afebrile, VS stable. LSC. C/O throat discomfort, denied pain meds, stated MuGaurd temporarily relieved discomfort. Denied nausea. Continues to have loose stools. Good UOP. No nose bleeds today, stated Afrin is helping. Tacro increased. In bed all of shift. Hourly rounding completed, continue with POC.

## 2021-02-23 NOTE — PLAN OF CARE
"Isael has been afebrile, OVSS.  Lungs clear.  No complaints of pain or nausea.  Mild nose bleed.  RN encouraged pt to apply pressure and pt stated \"I have, nothing works\" and continued to blow nose with tissues.  Platelets replaced without issues and premeds of tylenol and benadryl each given x1, no evidence of bleeding since.  Good UO, no stool dumped by RN.  Hourly rounding completed.  Continue with POC.  "

## 2021-02-23 NOTE — PHARMACY-IMMUNOSUPPRESSION MONITORING
D:  Free mycophenolic acid levels were drawn on dosing of 960 mg IV every 8 hours.  The MMF is being used as prophylaxis for prevention of GVHD.   Free levels are drawn to calculate an area under the curve (AUC) which is used to determine if dosing is adequate to balance prevention of GVHD versus myelosuppression.                  Infusion time:  1054-8764                  Calculated AUC: 211.4 ng*hr/ml    A:  The AUC is therapeutic with a desired AUC range of 200-250 ng*hr/ml for an 8 hour dosing  interval.     P:  Continue with current MMF dosing.  Will not order more levels unless it would be clinically necessary.  Pharmacy will continue to follow.    Andre DhillonD

## 2021-02-23 NOTE — PROGRESS NOTES
Pediatric BMT Daily Progress Note    Interval Events: Artemio remains afebrile and clinically- day +7 today, counts at liana. He had a nosebleed which lasted a while last evening. He received platelets. His pain and nausea are fairly well controlled although it does hurt for him to swallow. He has a pruritic rash/irritation in his dominga-area.    Review of Systems: Pertinent positives include those mentioned in interval events. A complete review of systems was performed and is otherwise negative.      Medications:  Please see MAR    Physical Exam:    Temp:  [98.5  F (36.9  C)-99.3  F (37.4  C)] 98.9  F (37.2  C)  Pulse:  [] 91  Resp:  [16-20] 20  BP: ()/(54-77) 101/61  SpO2:  [98 %-100 %] 98 %     I/O last 3 completed shifts:  In: 4083.85 [I.V.:1732.85; Other:21]  Out: 3350 [Urine:3100; Stool:250]    GEN: Interactive, pleasant and cooperative, lying in bed.  NAD.  Mom present.   HEENT: Head NC/AT, PERRL, EOMs intact, sclerae clear, nares patent, MMM. Erythema and ridging of lateral tongue and interior cheeks. Scabbed sore on left anterior tongue and frenulum.    CARD: RRR, no murmur.  RESP: Lungs clear to auscultation bilaterally. Normal work of breathing. .  ABD: Soft, NT, ND, NABS present, no organomegaly or masses noted.   EXTREM: WWP, MAEE  SKIN: Dry skin; scattered erythematous papules on mid-chest. Dominga-area irritation, pink in color.   NEURO: No focal deficits  ACCESS: Double lumen burns, port (not accessed)    Labs: All reviewed with pertinent positives: Hgb 8.9, Plts 8k; BUN 15, Creat 0.55    Assessment and Plan: Artemio is a 21 yo male with history of multiply relapsed ALL undergoing conditioning/UCBT per 2013-09. Day +7, afebrile and clinically stable. C.Diff positive 2/17 for which he is on oral vancomycin. Experiencing intermittent epistaxis.    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 S/p preparative regimen per [id number] with Thiotepa (days -7 trough -6), Fludarabine (days -5 through -3), Busulfan (days -5 through -3), ATG (days -4 through -2) followed by UCBT (2/16). Counts at liana.  - Disease (BMBx) and donor evaluations due day +21     # Risk for GVHD:   - Continue Tacrolimus, goal 10-15 through day +14. Level 8.9 yesterday with subsequent dose increase. Repeat level pending from today.  - Continue MMF-- levels due tonight.    FEN/Renal:  # Risk for malnutrition: intake minimal  - Regular diet as tolerated  - Dietician to follow  - Continue TPN/IL    # Electrolyte abnormalities:    - Check daily electrolytes   - Replace/adjust in TPN as indicated     # Risk for renal dysfunction and fluid overload: GFR (1/28): 119 mls/min  - Monitor I/O's and daily weights     # Risk for aHUS/TA-TMA:    - Monitor LDH qMon/Thurs: 141 (2/22)  - Monitor urine protein/creatinine qTuesday     Pulmonary:  # Risk for pulmonary insufficiency: No current concerns    Cardiovascular:  # Asymptomatic WPW syndrome (diagnosed 2018): no interventions to date. Most recent EKG (1/27). ECHO (1/26) normal, EF 64%. 24h Zio patch holter (1/27-1/28) revealed c/w WPW with no ventricular ectopies present. Cardiology following.   - Obtain weekly BNP (improved, WNL at 20 today) and troponin (<0.015 today). Per cardiology, elevated BNP likely attributed to fluid shifts (threshold of concern >5000). Next due 2/23.  - Follow up ECHO in 6 mos (7/2021)     # Risk for hypertension secondary to medications: normotensive currently, monitoring     Heme:   # Pancytopenia secondary to chemotherapy  - Transfuse for hemoglobin < 7, increase platelet parameter from 10 to 20k and check twice daily (see epistaxis below)  - Hx of transfusion reactions (hives): Premedicate with tylenol and benadryl  prior to pRBCs and plts  - GCSF daily until ANC >/= 2500 x 3 consecutive days    # Epistaxis:   - Initiate 3d course of afrin  - Increase plt parameter to 20k  - obtain platelets twice daily     Infectious Disease:  # Fever in context of BMT conditioning and central access (2/13):  - Follow Blood cxs (NGTD). Of note, also has un-accessed port-a-cath.   - Continue empiric Cefepime through count recovery    # C.Diff (2/17): continue 10d course of oral vancomycin through 2/26    # Risk for infection given immunocompromised status with need for prophylaxis:  - Viral (CMV/HSV sero positive): HD Acyclovir. Weekly CMV neg 2/17.  - Fungal: Micafungin. Transition to either itraconazole or posaconazole when able.  - Bacterial: Cefepime as above  - PCP: Pentamidine given 2/15. Consider Bactrim next month if WBC criteria met (no hx of adverse reaction).     Past infections: C. Diff (November 2020)     GI:   # Reflux:  - BID protonix   - PRN Tums     # Nausea:  - ativan q6h, 3d course of emend (2/21-2/23)   - Benadryl prn     # Risk for VOD/elastography study participant: most recent abd US (2/22) revealed stable, mild hepatomegaly  - Ursodiol TID   - labs and imaging per study    # Loose stools:  - see C.Diff above  - maintain hydration     Neuro:  # Mucositis/abdominal pain:    - tylenol PRN. Start mugard PRN - home supply processed through hospital pharmacy.  - Morphine PRN (not PCA as he does not want to wear pulse ox  - encourage warm packs     # History of significant spinal headaches: consider using Candelario needle with lumbar punctures    # CRS history. 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 No CRS, neurotoxicity or infectious complications from [place] CAR-T. Continue Q 2 weeks IGG levels     Derm:  # Barbie-area rash: initiate nystatin ointment BID     Fertility: Sperm collected previously collected for fertility  preservation     Discharge Considerations: Expected lengths of hospitalization for patients undergoing stem cell transplantation vary by primary diagnosis, conditioning regimen, graft source, and development of complications. A typical stay is 6 weeks.    The above plan of care was developed by and communicated to me by the   Pediatric BMT attending physician, Viky Zavala MD.    [facility] Blood and Marrow Transplant  [id number] [place] [address]  [phone]  [phone]    Pediatric BMT Attending Inpatient Note:  Artemio was seen and evaluated by me today.      The significant interval history includes bloody nose that kept him up at night.  Also has a rash in his perineum.  Mucositis is starting.     I have reviewed changes and data from the last 24 hours, including medications, laboratory results, vital signs and pathology results.      I have formulated and discussed the plan with the BMT team. The relevant clinical topics addressed included the followin21 y/o M w/ h/o multiply relapsed Pre-B cell ALL (s/p MSD BMT, CART-19, CART-22) in CR3 here for consolidation of remission with MAC (FluBuTT ATG) S/P UCBT, risk for malnutrition- TPN, risk for nausea-on second course of emend, risk for VOD, risk for gastritis, risk for opportunistic infection on micafungin, valtrex and pentamidine and cefepime, now with C. Diff infection- on vancomycin, at risk of GVHD- on tacro- adjusting levels and MMF, risk of mucositis- morphine PRN, WPW, monitoring pro-BNP and troponins, recent Ziopatch per cards had no PACs or PVCs.  Replacing magnesium and potassium as needed.   Will increase platelet checks to BID and increase his parameter to 20k.  Will prescribe nystatin for the rash.  Still did not want continuous IV pain meds for mucositis.        I discussed the course and plan with the patient/family and answered  all of their questions to the best of my ability.     My care coordination activities today include oversight of planned lab studies, oversight of medication changes and discussion with BMT team-members.      My total floor time today was at least 40 minutes, greater than 50% of which was counseling and coordination of care.    Viky Zavala MD, PhD    Pediatric Blood and Marrow Transplant  [facility]      Patient Active Problem List   Diagnosis     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)     Aaron-Parkinson-White (WPW) syndrome     Bone marrow transplant candidate     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)     At risk for infection     S/P allogeneic bone marrow transplant (H)     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     Examination of participant or control in clinical research

## 2021-02-24 LAB
ANION GAP SERPL CALCULATED.3IONS-SCNC: 6 MMOL/L (ref 3–14)
BLD PROD TYP BPU: NORMAL
BLD PROD TYP BPU: NORMAL
BLD UNIT ID BPU: 0
BLOOD PRODUCT CODE: NORMAL
BPU ID: NORMAL
BUN SERPL-MCNC: 16 MG/DL (ref 7–30)
CALCIUM SERPL-MCNC: 9.2 MG/DL (ref 8.5–10.1)
CHLORIDE SERPL-SCNC: 105 MMOL/L (ref 94–109)
CMV DNA SPEC NAA+PROBE-ACNC: NORMAL [IU]/ML
CMV DNA SPEC NAA+PROBE-LOG#: NORMAL {LOG_IU}/ML
CO2 SERPL-SCNC: 25 MMOL/L (ref 20–32)
CREAT SERPL-MCNC: 0.57 MG/DL (ref 0.66–1.25)
DIFFERENTIAL METHOD BLD: ABNORMAL
ERYTHROCYTE [DISTWIDTH] IN BLOOD BY AUTOMATED COUNT: 10.6 % (ref 10–15)
GFR SERPL CREATININE-BSD FRML MDRD: >90 ML/MIN/{1.73_M2}
GLUCOSE SERPL-MCNC: 152 MG/DL (ref 70–99)
HCT VFR BLD AUTO: 21.9 % (ref 40–53)
HGB BLD-MCNC: 8.1 G/DL (ref 13.3–17.7)
MAGNESIUM SERPL-MCNC: 1.7 MG/DL (ref 1.6–2.3)
MCH RBC QN AUTO: 31.2 PG (ref 26.5–33)
MCHC RBC AUTO-ENTMCNC: 37 G/DL (ref 31.5–36.5)
MCV RBC AUTO: 84 FL (ref 78–100)
NUM BPU REQUESTED: 1
PHOSPHATE SERPL-MCNC: 4.2 MG/DL (ref 2.5–4.5)
PLATELET # BLD AUTO: 18 10E9/L (ref 150–450)
PLATELET # BLD AUTO: 27 10E9/L (ref 150–450)
POTASSIUM SERPL-SCNC: 4 MMOL/L (ref 3.4–5.3)
RBC # BLD AUTO: 2.6 10E12/L (ref 4.4–5.9)
SODIUM SERPL-SCNC: 136 MMOL/L (ref 133–144)
SPECIMEN SOURCE: NORMAL
TACROLIMUS BLD-MCNC: 10.2 UG/L (ref 5–15)
TME LAST DOSE: NORMAL H
TRANSFUSION STATUS PATIENT QL: NORMAL
TRANSFUSION STATUS PATIENT QL: NORMAL
WBC # BLD AUTO: 0 10E9/L (ref 4–11)

## 2021-02-24 PROCEDURE — 85049 AUTOMATED PLATELET COUNT: CPT | Performed by: NURSE PRACTITIONER

## 2021-02-24 PROCEDURE — 206N000001 HC R&B BMT UMMC

## 2021-02-24 PROCEDURE — 84100 ASSAY OF PHOSPHORUS: CPT | Performed by: NURSE PRACTITIONER

## 2021-02-24 PROCEDURE — 258N000003 HC RX IP 258 OP 636: Performed by: PEDIATRICS

## 2021-02-24 PROCEDURE — 250N000011 HC RX IP 250 OP 636: Performed by: NURSE PRACTITIONER

## 2021-02-24 PROCEDURE — 250N000011 HC RX IP 250 OP 636: Performed by: PEDIATRICS

## 2021-02-24 PROCEDURE — C9113 INJ PANTOPRAZOLE SODIUM, VIA: HCPCS | Performed by: NURSE PRACTITIONER

## 2021-02-24 PROCEDURE — 81265 STR MARKERS SPECIMEN ANAL: CPT | Performed by: PEDIATRICS

## 2021-02-24 PROCEDURE — 250N000009 HC RX 250: Performed by: PEDIATRICS

## 2021-02-24 PROCEDURE — 85027 COMPLETE CBC AUTOMATED: CPT

## 2021-02-24 PROCEDURE — 250N000013 HC RX MED GY IP 250 OP 250 PS 637: Performed by: NURSE PRACTITIONER

## 2021-02-24 PROCEDURE — 99233 SBSQ HOSP IP/OBS HIGH 50: CPT | Performed by: PEDIATRICS

## 2021-02-24 PROCEDURE — 258N000001 HC RX 258: Performed by: NURSE PRACTITIONER

## 2021-02-24 PROCEDURE — 83735 ASSAY OF MAGNESIUM: CPT | Performed by: NURSE PRACTITIONER

## 2021-02-24 PROCEDURE — 80197 ASSAY OF TACROLIMUS: CPT | Performed by: NURSE PRACTITIONER

## 2021-02-24 PROCEDURE — 250N000009 HC RX 250: Performed by: NURSE PRACTITIONER

## 2021-02-24 PROCEDURE — 80048 BASIC METABOLIC PNL TOTAL CA: CPT | Performed by: NURSE PRACTITIONER

## 2021-02-24 PROCEDURE — P9037 PLATE PHERES LEUKOREDU IRRAD: HCPCS | Performed by: NURSE PRACTITIONER

## 2021-02-24 RX ORDER — MORPHINE SULFATE 2 MG/ML
1 INJECTION, SOLUTION INTRAMUSCULAR; INTRAVENOUS
Status: DISCONTINUED | OUTPATIENT
Start: 2021-02-24 | End: 2021-03-12

## 2021-02-24 RX ORDER — MORPHINE SULFATE 2 MG/ML
2 INJECTION, SOLUTION INTRAMUSCULAR; INTRAVENOUS EVERY 4 HOURS
Status: DISCONTINUED | OUTPATIENT
Start: 2021-02-24 | End: 2021-03-01

## 2021-02-24 RX ADMIN — LORAZEPAM 1 MG: 2 INJECTION INTRAMUSCULAR; INTRAVENOUS at 22:24

## 2021-02-24 RX ADMIN — VANCOMYCIN HYDROCHLORIDE 125 MG: 125 CAPSULE ORAL at 16:09

## 2021-02-24 RX ADMIN — URSODIOL 600 MG: 300 CAPSULE ORAL at 19:47

## 2021-02-24 RX ADMIN — Medication 5 ML: at 13:13

## 2021-02-24 RX ADMIN — LORAZEPAM 1 MG: 2 INJECTION INTRAMUSCULAR; INTRAVENOUS at 09:51

## 2021-02-24 RX ADMIN — MYCOPHENOLATE MOFETIL 960 MG: 500 INJECTION, POWDER, LYOPHILIZED, FOR SOLUTION INTRAVENOUS at 05:42

## 2021-02-24 RX ADMIN — ACYCLOVIR SODIUM 600 MG: 50 INJECTION, SOLUTION INTRAVENOUS at 07:28

## 2021-02-24 RX ADMIN — ACYCLOVIR SODIUM 600 MG: 50 INJECTION, SOLUTION INTRAVENOUS at 00:20

## 2021-02-24 RX ADMIN — CEFEPIME 2 G: 2 INJECTION, POWDER, FOR SOLUTION INTRAVENOUS at 21:17

## 2021-02-24 RX ADMIN — LORAZEPAM 1 MG: 2 INJECTION INTRAMUSCULAR; INTRAVENOUS at 03:33

## 2021-02-24 RX ADMIN — DEXTROSE AND SODIUM CHLORIDE: 5; 450 INJECTION, SOLUTION INTRAVENOUS at 19:48

## 2021-02-24 RX ADMIN — Medication 5 ML: at 07:31

## 2021-02-24 RX ADMIN — MYCOPHENOLATE MOFETIL 960 MG: 500 INJECTION, POWDER, LYOPHILIZED, FOR SOLUTION INTRAVENOUS at 14:05

## 2021-02-24 RX ADMIN — MORPHINE SULFATE 2 MG: 2 INJECTION, SOLUTION INTRAMUSCULAR; INTRAVENOUS at 13:30

## 2021-02-24 RX ADMIN — MYCOPHENOLATE MOFETIL 960 MG: 500 INJECTION, POWDER, LYOPHILIZED, FOR SOLUTION INTRAVENOUS at 22:25

## 2021-02-24 RX ADMIN — MICAFUNGIN SODIUM 50 MG: 50 INJECTION, POWDER, LYOPHILIZED, FOR SOLUTION INTRAVENOUS at 09:54

## 2021-02-24 RX ADMIN — DEXTROSE MONOHYDRATE 0.03 MG/KG/DAY: 5 INJECTION, SOLUTION INTRAVENOUS at 19:47

## 2021-02-24 RX ADMIN — I.V. FAT EMULSION 240 ML: 20 EMULSION INTRAVENOUS at 19:48

## 2021-02-24 RX ADMIN — LORAZEPAM 1 MG: 2 INJECTION INTRAMUSCULAR; INTRAVENOUS at 16:09

## 2021-02-24 RX ADMIN — URSODIOL 600 MG: 300 CAPSULE ORAL at 15:44

## 2021-02-24 RX ADMIN — MORPHINE SULFATE 2 MG: 2 INJECTION, SOLUTION INTRAMUSCULAR; INTRAVENOUS at 21:17

## 2021-02-24 RX ADMIN — Medication 315 MCG: at 18:57

## 2021-02-24 RX ADMIN — MORPHINE SULFATE 2 MG: 2 INJECTION, SOLUTION INTRAMUSCULAR; INTRAVENOUS at 17:32

## 2021-02-24 RX ADMIN — NYSTATIN: 100000 OINTMENT TOPICAL at 21:23

## 2021-02-24 RX ADMIN — NYSTATIN: 100000 OINTMENT TOPICAL at 07:31

## 2021-02-24 RX ADMIN — VANCOMYCIN HYDROCHLORIDE 125 MG: 125 CAPSULE ORAL at 19:47

## 2021-02-24 RX ADMIN — URSODIOL 600 MG: 300 CAPSULE ORAL at 07:28

## 2021-02-24 RX ADMIN — DIPHENHYDRAMINE HYDROCHLORIDE 50 MG: 50 INJECTION, SOLUTION INTRAMUSCULAR; INTRAVENOUS at 15:36

## 2021-02-24 RX ADMIN — CEFEPIME 2 G: 2 INJECTION, POWDER, FOR SOLUTION INTRAVENOUS at 13:08

## 2021-02-24 RX ADMIN — PANTOPRAZOLE SODIUM 40 MG: 40 INJECTION, POWDER, FOR SOLUTION INTRAVENOUS at 07:28

## 2021-02-24 RX ADMIN — POTASSIUM CHLORIDE: 2 INJECTION, SOLUTION, CONCENTRATE INTRAVENOUS at 19:47

## 2021-02-24 RX ADMIN — ACYCLOVIR SODIUM 600 MG: 50 INJECTION, SOLUTION INTRAVENOUS at 15:44

## 2021-02-24 RX ADMIN — CEFEPIME 2 G: 2 INJECTION, POWDER, FOR SOLUTION INTRAVENOUS at 04:49

## 2021-02-24 RX ADMIN — OXYMETAZOLINE HYDROCHLORIDE 2 SPRAY: 0.05 SPRAY NASAL at 07:30

## 2021-02-24 RX ADMIN — VANCOMYCIN HYDROCHLORIDE 125 MG: 125 CAPSULE ORAL at 07:28

## 2021-02-24 RX ADMIN — VANCOMYCIN HYDROCHLORIDE 125 MG: 125 CAPSULE ORAL at 13:08

## 2021-02-24 RX ADMIN — PANTOPRAZOLE SODIUM 40 MG: 40 INJECTION, POWDER, FOR SOLUTION INTRAVENOUS at 19:47

## 2021-02-24 ASSESSMENT — MIFFLIN-ST. JEOR: SCORE: 1609.37

## 2021-02-24 NOTE — PLAN OF CARE
Afebrile, vitals stable, lungs clear. Complaining of neck pain and morphine on schedule morphine. Spitting up thick oral secretions and few blood blisters on tongue. Emesis x1 when tried eating apple sauce. PRN benadryl given and scheduled ativan helpful. Platelets x1 and no nose bleed today. Continue with plan of care.

## 2021-02-24 NOTE — PROGRESS NOTES
Pediatric BMT Daily Progress Note    Interval Events: Artemio remains afebrile and clinically stable- day +8 today, counts at liana. No epistaxis overnight with Afrin BID and increased platelet threshold. Nausea well controlled on current medications. Mucositis pain worsening over past 24 hours. Per Isael, his barbie-area rash is improved on nystatin BID.   Review of Systems: Pertinent positives include those mentioned in interval events. A complete review of systems was performed and is otherwise negative.      Medications:  Please see MAR    Physical Exam:    Temp:  [98.1  F (36.7  C)-99  F (37.2  C)] 98.9  F (37.2  C)  Pulse:  [] 99  Resp:  [18-20] 18  BP: ()/(56-66) 93/56  SpO2:  [98 %-100 %] 99 %     I/O last 3 completed shifts:  In: 3521.32 [I.V.:1451.32; Other:30]  Out: 1630 [Urine:1350; Stool:280]    GEN: Interactive, pleasant and cooperative, lying in bed.  NAD.  Mom present.   HEENT: Head NC/AT, PERRL, EOMs intact, sclerae clear, nares patent, MMM. Erythema and ridging of lateral tongue and interior cheeks. Scabbed sore on left anterior tongue.    CARD: RRR, no murmur.  RESP: Lungs clear to auscultation bilaterally. Normal work of breathing. .  ABD: Soft, NT, ND, NABS present, no organomegaly or masses noted.   EXTREM: WWP, MAEE  SKIN: Dry skin; scattered erythematous papules on mid-chest. Barbie-area not examined today.  NEURO: No focal deficits  ACCESS: Double lumen burns, port (not accessed)    Labs: All reviewed with pertinent positives: Hgb 8.1, Plts 27k; BUN 16, Creat 0.57    Assessment and Plan: Artemio is a 19 yo male with history of multiply relapsed ALL undergoing conditioning/UCBT per 2013-09. Day +8, afebrile and clinically stable. C.Diff positive 2/17 for which he is on oral vancomycin. Epistaxis improved with increased platelet parameters. Will schedule morphine q4H today for worsening mucositis.     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) and PLAT-05 at [place] (loss of CD22 CAR and rejection of CD19 directed CAR without evidence of disease day +21). S/p preparative regimen per [id number] with Thiotepa (days -7 trough -6), Fludarabine (days -5 through -3), Busulfan (days -5 through -3), ATG (days -4 through -2) followed by UCBT (2/16). Counts at liana.  - Disease (BMBx) and donor evaluations due day +21     # Risk for GVHD:   - Continue Tacrolimus, goal 10-15 through day +14. Level 10.2 today, no dose change. Daily levels.   - Continue MMF-- levels WNL.     FEN/Renal:  # Risk for malnutrition: intake minimal  - Regular diet as tolerated  - Dietician to follow  - Continue TPN/IL    # Electrolyte abnormalities:    - Check daily electrolytes   - Replace/adjust in TPN as indicated     # Risk for renal dysfunction and fluid overload: GFR (1/28): 119 mls/min  - Monitor I/O's and daily weights     # Risk for aHUS/TA-TMA:    - Monitor LDH qMon/Thurs: 141 (2/22)  - Monitor urine protein/creatinine qTuesday     Pulmonary:  # Risk for pulmonary insufficiency: No current concerns    Cardiovascular:  # Asymptomatic WPW syndrome (diagnosed 2018): no interventions to date. Most recent EKG (1/27). ECHO (1/26) normal, EF 64%. 24h Zio patch holter (1/27-1/28) revealed c/w WPW with no ventricular ectopies present. Cardiology following. BNP elevated 664 (2/16) likely attributed to fluid shifts per cardiology. BNP threshold of concern >5000.  - Obtain weekly BNP (improved, WNL at 20 2/23) and troponin (<0.015 2/23).  Next due 3/2.  - Follow up ECHO in 6 mos (7/2021)     # Risk for hypertension secondary to medications: normotensive currently, monitoring     Heme:   # Pancytopenia secondary to chemotherapy  - Transfuse for hemoglobin < 7, increase platelet parameter from 10 to 20k and check twice daily (see epistaxis below)  - Hx of transfusion reactions  (hives): Premedicate with tylenol and benadryl prior to pRBCs and plts  - GCSF daily until ANC >/= 2500 x 3 consecutive days    # Epistaxis:   - Continue 3 day course of afrin (2/23-2/25)   - Maintain plt parameter at 20K  - Platelet check twice daily     Infectious Disease:  # Fever in context of BMT conditioning and central access (2/13):  - Follow Blood cxs (NGTD). Of note, also has un-accessed port-a-cath.   - Continue empiric Cefepime through count recovery    # C.Diff (2/17): continue 10d course of oral vancomycin through 2/26    # Risk for infection given immunocompromised status with need for prophylaxis:  - Viral (CMV/HSV sero positive): HD Acyclovir. Weekly CMV neg 2/23.  - Fungal: Micafungin. Transition to either itraconazole or posaconazole when able.  - Bacterial: Cefepime as above  - PCP: Pentamidine given 2/15. Consider Bactrim next month if WBC criteria met (no hx of adverse reaction).     Past infections: C. Diff (November 2020)     GI:   # Reflux:  - BID protonix   - PRN Tums     # Nausea:  - ativan q6h, s/p 3d course of emend (2/21-2/23)   - Benadryl prn     # Risk for VOD/elastography study participant: most recent abd US (2/22) revealed stable, mild hepatomegaly  - Ursodiol TID   - labs and imaging per study    # Loose stools:  - see C.Diff above  - maintain hydration     Neuro:  # Mucositis/abdominal pain:    - tylenol PRN.   - Mugard QID- home medication  - Morphine 2 mg q4H scheduled with 1 mg PRN q2H (not PCA as he does not want to wear pulse ox)  - encourage warm packs     # History of significant spinal headaches: consider using Candelario needle with lumbar punctures    # CRS history. 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 No CRS, neurotoxicity or infectious complications from [place] CAR-T. Continue Q 2 weeks IGG levels     Derm:  # Barbie-area rash: nystatin ointment BID     Fertility: Sperm collected  previously collected for fertility preservation     Discharge Considerations: Expected lengths of hospitalization for patients undergoing stem cell transplantation vary by primary diagnosis, conditioning regimen, graft source, and development of complications. A typical stay is 6 weeks.    The above plan of care was developed by and communicated to me by the   Pediatric BMT attending physician, Viky Zavala MD.    Gabriella YOUSIF-PC  [facility]  Pediatric Blood and Marrow Transplant    Pediatric BMT Attending Inpatient Note:  Artemio was seen and evaluated by me today.      The significant interval history includes no more bloody noses.  Had a headache.  Mucositis worsened today.     I have reviewed changes and data from the last 24 hours, including medications, laboratory results, vital signs and pathology results.      I have formulated and discussed the plan with the BMT team. The relevant clinical topics addressed included the followin19 y/o M w/ h/o multiply relapsed Pre-B cell ALL (s/p MSD BMT, CART-19, CART-22) in CR3 here for consolidation of remission with MAC (FluBuTT ATG) S/P UCBT, risk for malnutrition- TPN, risk for nausea-S/P second course of emend, risk for VOD, risk for gastritis, risk for opportunistic infection on micafungin, valtrex and pentamidine and cefepime, now with C. Diff infection- on vancomycin, at risk of GVHD- on tacro- adjusting levels and MMF, risk of mucositis- morphine PRN, WPW, monitoring pro-BNP and troponins, recent Ziopatch per cards had no PACs or PVCs, epistaxis-platelet parameter to >20 and platelet checks BID.  Replacing magnesium and potassium as needed.     Also on mugard.  Rash improved.  Start scheduled morphine.      I discussed the course and plan with the patient/family and answered all of their questions to the best of my ability.     My care coordination activities today include oversight of planned lab studies,  oversight of medication changes and discussion with BMT team-members.      My total floor time today was at least 40 minutes, greater than 50% of which was counseling and coordination of care.    Viky Zavala MD, PhD    Pediatric Blood and Marrow Transplant  [facility]         Patient Active Problem List   Diagnosis     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)     Aaron-Parkinson-White (WPW) syndrome     Bone marrow transplant candidate     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)     At risk for infection     S/P allogeneic bone marrow transplant (H)     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     Examination of participant or control in clinical research

## 2021-02-24 NOTE — PLAN OF CARE
Isael has been afebrile, OVSS.  Lungs clear.  Complaining of headache/neck pain, PRN morphine x1 with good results.  Nauseous x1, relieved after bites of applesauce.  Good UO, no stool.  No replacements needed. Appears to have slept well between cares.  Hourly rounding completed.  Continue with POC.

## 2021-02-24 NOTE — PLAN OF CARE
Charge RN stopped in patients room to discuss concerns raised by Mother. RN asked mom if she would like to discuss them and she said not at this time. Charge RN then said if you feel like you want to discuss please do not hesitate to let your nurse know.

## 2021-02-25 LAB
ALBUMIN SERPL-MCNC: 3 G/DL (ref 3.4–5)
ALP SERPL-CCNC: 57 U/L (ref 40–150)
ALT SERPL W P-5'-P-CCNC: 28 U/L (ref 0–70)
ANION GAP SERPL CALCULATED.3IONS-SCNC: 7 MMOL/L (ref 3–14)
APTT PPP: 40 SEC (ref 22–37)
AST SERPL W P-5'-P-CCNC: 13 U/L (ref 0–45)
BACTERIA SPEC CULT: NORMAL
BILIRUB DIRECT SERPL-MCNC: 0.4 MG/DL (ref 0–0.2)
BILIRUB SERPL-MCNC: 1 MG/DL (ref 0.2–1.3)
BUN SERPL-MCNC: 18 MG/DL (ref 7–30)
CALCIUM SERPL-MCNC: 9.1 MG/DL (ref 8.5–10.1)
CHLORIDE SERPL-SCNC: 106 MMOL/L (ref 94–109)
CO2 SERPL-SCNC: 25 MMOL/L (ref 20–32)
CREAT SERPL-MCNC: 0.54 MG/DL (ref 0.66–1.25)
DIFFERENTIAL METHOD BLD: ABNORMAL
ERYTHROCYTE [DISTWIDTH] IN BLOOD BY AUTOMATED COUNT: 10.5 % (ref 10–15)
FIBRINOGEN PPP-MCNC: 513 MG/DL (ref 200–420)
GFR SERPL CREATININE-BSD FRML MDRD: >90 ML/MIN/{1.73_M2}
GLUCOSE SERPL-MCNC: 115 MG/DL (ref 70–99)
HCT VFR BLD AUTO: 20.2 % (ref 40–53)
HGB BLD-MCNC: 7.2 G/DL (ref 13.3–17.7)
INR PPP: 1.1 (ref 0.86–1.14)
LDH SERPL L TO P-CCNC: 124 U/L (ref 85–227)
MCH RBC QN AUTO: 30.6 PG (ref 26.5–33)
MCHC RBC AUTO-ENTMCNC: 35.6 G/DL (ref 31.5–36.5)
MCV RBC AUTO: 86 FL (ref 78–100)
PLATELET # BLD AUTO: 33 10E9/L (ref 150–450)
PLATELET # BLD AUTO: 54 10E9/L (ref 150–450)
POTASSIUM SERPL-SCNC: 4.3 MMOL/L (ref 3.4–5.3)
PROT SERPL-MCNC: 5.9 G/DL (ref 6.8–8.8)
RBC # BLD AUTO: 2.35 10E12/L (ref 4.4–5.9)
SODIUM SERPL-SCNC: 138 MMOL/L (ref 133–144)
SPECIMEN SOURCE: NORMAL
TACROLIMUS BLD-MCNC: 8.8 UG/L (ref 5–15)
TME LAST DOSE: NORMAL H
WBC # BLD AUTO: 0 10E9/L (ref 4–11)

## 2021-02-25 PROCEDURE — 250N000011 HC RX IP 250 OP 636: Performed by: PEDIATRICS

## 2021-02-25 PROCEDURE — 250N000013 HC RX MED GY IP 250 OP 250 PS 637: Performed by: NURSE PRACTITIONER

## 2021-02-25 PROCEDURE — 85384 FIBRINOGEN ACTIVITY: CPT | Performed by: PEDIATRICS

## 2021-02-25 PROCEDURE — 80053 COMPREHEN METABOLIC PANEL: CPT | Performed by: NURSE PRACTITIONER

## 2021-02-25 PROCEDURE — 250N000011 HC RX IP 250 OP 636: Performed by: NURSE PRACTITIONER

## 2021-02-25 PROCEDURE — 258N000003 HC RX IP 258 OP 636: Performed by: PEDIATRICS

## 2021-02-25 PROCEDURE — 250N000009 HC RX 250: Performed by: NURSE PRACTITIONER

## 2021-02-25 PROCEDURE — 83615 LACTATE (LD) (LDH) ENZYME: CPT | Performed by: NURSE PRACTITIONER

## 2021-02-25 PROCEDURE — 85730 THROMBOPLASTIN TIME PARTIAL: CPT | Performed by: PEDIATRICS

## 2021-02-25 PROCEDURE — C9113 INJ PANTOPRAZOLE SODIUM, VIA: HCPCS | Performed by: NURSE PRACTITIONER

## 2021-02-25 PROCEDURE — 85610 PROTHROMBIN TIME: CPT | Performed by: PEDIATRICS

## 2021-02-25 PROCEDURE — 82248 BILIRUBIN DIRECT: CPT | Performed by: NURSE PRACTITIONER

## 2021-02-25 PROCEDURE — 80197 ASSAY OF TACROLIMUS: CPT | Performed by: NURSE PRACTITIONER

## 2021-02-25 PROCEDURE — 99233 SBSQ HOSP IP/OBS HIGH 50: CPT | Performed by: PEDIATRICS

## 2021-02-25 PROCEDURE — 206N000001 HC R&B BMT UMMC

## 2021-02-25 PROCEDURE — 250N000009 HC RX 250: Performed by: PEDIATRICS

## 2021-02-25 PROCEDURE — 85049 AUTOMATED PLATELET COUNT: CPT | Performed by: NURSE PRACTITIONER

## 2021-02-25 RX ORDER — DEXTROSE MONOHYDRATE 50 MG/ML
INJECTION, SOLUTION INTRAVENOUS
Status: DISCONTINUED
Start: 2021-02-25 | End: 2021-02-26 | Stop reason: HOSPADM

## 2021-02-25 RX ORDER — OXYMETAZOLINE HYDROCHLORIDE 0.05 G/100ML
1 SPRAY NASAL 2 TIMES DAILY PRN
Status: DISCONTINUED | OUTPATIENT
Start: 2021-02-25 | End: 2021-03-12

## 2021-02-25 RX ADMIN — ACYCLOVIR SODIUM 600 MG: 50 INJECTION, SOLUTION INTRAVENOUS at 07:58

## 2021-02-25 RX ADMIN — MORPHINE SULFATE 2 MG: 2 INJECTION, SOLUTION INTRAMUSCULAR; INTRAVENOUS at 20:48

## 2021-02-25 RX ADMIN — I.V. FAT EMULSION 240 ML: 20 EMULSION INTRAVENOUS at 19:39

## 2021-02-25 RX ADMIN — VANCOMYCIN HYDROCHLORIDE 125 MG: 125 CAPSULE ORAL at 07:58

## 2021-02-25 RX ADMIN — LORAZEPAM 1 MG: 2 INJECTION INTRAMUSCULAR; INTRAVENOUS at 15:55

## 2021-02-25 RX ADMIN — PANTOPRAZOLE SODIUM 40 MG: 40 INJECTION, POWDER, FOR SOLUTION INTRAVENOUS at 19:43

## 2021-02-25 RX ADMIN — ACYCLOVIR SODIUM 600 MG: 50 INJECTION, SOLUTION INTRAVENOUS at 15:55

## 2021-02-25 RX ADMIN — Medication 315 MCG: at 19:44

## 2021-02-25 RX ADMIN — NYSTATIN: 100000 OINTMENT TOPICAL at 21:00

## 2021-02-25 RX ADMIN — MORPHINE SULFATE 2 MG: 2 INJECTION, SOLUTION INTRAMUSCULAR; INTRAVENOUS at 05:20

## 2021-02-25 RX ADMIN — URSODIOL 600 MG: 300 CAPSULE ORAL at 19:45

## 2021-02-25 RX ADMIN — URSODIOL 600 MG: 300 CAPSULE ORAL at 13:55

## 2021-02-25 RX ADMIN — MORPHINE SULFATE 2 MG: 2 INJECTION, SOLUTION INTRAMUSCULAR; INTRAVENOUS at 17:09

## 2021-02-25 RX ADMIN — URSODIOL 600 MG: 300 CAPSULE ORAL at 07:58

## 2021-02-25 RX ADMIN — CEFEPIME 2 G: 2 INJECTION, POWDER, FOR SOLUTION INTRAVENOUS at 12:44

## 2021-02-25 RX ADMIN — VANCOMYCIN HYDROCHLORIDE 125 MG: 125 CAPSULE ORAL at 11:04

## 2021-02-25 RX ADMIN — DEXTROSE MONOHYDRATE 0.04 MG/KG/DAY: 5 INJECTION, SOLUTION INTRAVENOUS at 19:45

## 2021-02-25 RX ADMIN — PANTOPRAZOLE SODIUM 40 MG: 40 INJECTION, POWDER, FOR SOLUTION INTRAVENOUS at 07:58

## 2021-02-25 RX ADMIN — ACYCLOVIR SODIUM 600 MG: 50 INJECTION, SOLUTION INTRAVENOUS at 00:53

## 2021-02-25 RX ADMIN — MYCOPHENOLATE MOFETIL 960 MG: 500 INJECTION, POWDER, LYOPHILIZED, FOR SOLUTION INTRAVENOUS at 22:13

## 2021-02-25 RX ADMIN — NYSTATIN: 100000 OINTMENT TOPICAL at 07:58

## 2021-02-25 RX ADMIN — MYCOPHENOLATE MOFETIL 960 MG: 500 INJECTION, POWDER, LYOPHILIZED, FOR SOLUTION INTRAVENOUS at 14:01

## 2021-02-25 RX ADMIN — VANCOMYCIN HYDROCHLORIDE 125 MG: 125 CAPSULE ORAL at 15:54

## 2021-02-25 RX ADMIN — LORAZEPAM 1 MG: 2 INJECTION INTRAMUSCULAR; INTRAVENOUS at 22:11

## 2021-02-25 RX ADMIN — LORAZEPAM 1 MG: 2 INJECTION INTRAMUSCULAR; INTRAVENOUS at 05:20

## 2021-02-25 RX ADMIN — CEFEPIME 2 G: 2 INJECTION, POWDER, FOR SOLUTION INTRAVENOUS at 20:48

## 2021-02-25 RX ADMIN — MORPHINE SULFATE 2 MG: 2 INJECTION, SOLUTION INTRAMUSCULAR; INTRAVENOUS at 08:58

## 2021-02-25 RX ADMIN — MORPHINE SULFATE 2 MG: 2 INJECTION, SOLUTION INTRAMUSCULAR; INTRAVENOUS at 00:53

## 2021-02-25 RX ADMIN — CEFEPIME 2 G: 2 INJECTION, POWDER, FOR SOLUTION INTRAVENOUS at 05:21

## 2021-02-25 RX ADMIN — MICAFUNGIN SODIUM 50 MG: 50 INJECTION, POWDER, LYOPHILIZED, FOR SOLUTION INTRAVENOUS at 11:04

## 2021-02-25 RX ADMIN — MORPHINE SULFATE 2 MG: 2 INJECTION, SOLUTION INTRAMUSCULAR; INTRAVENOUS at 12:44

## 2021-02-25 RX ADMIN — POTASSIUM CHLORIDE: 2 INJECTION, SOLUTION, CONCENTRATE INTRAVENOUS at 19:39

## 2021-02-25 RX ADMIN — MYCOPHENOLATE MOFETIL 960 MG: 500 INJECTION, POWDER, LYOPHILIZED, FOR SOLUTION INTRAVENOUS at 06:37

## 2021-02-25 RX ADMIN — LORAZEPAM 1 MG: 2 INJECTION INTRAMUSCULAR; INTRAVENOUS at 10:13

## 2021-02-25 RX ADMIN — Medication 5 ML: at 07:59

## 2021-02-25 RX ADMIN — VANCOMYCIN HYDROCHLORIDE 125 MG: 125 CAPSULE ORAL at 19:45

## 2021-02-25 ASSESSMENT — MIFFLIN-ST. JEOR: SCORE: 1615.37

## 2021-02-25 NOTE — PROGRESS NOTES
CLINICAL NUTRITION SERVICES - REASSESSMENT NOTE    ANTHROPOMETRICS  Height: 173.5 cm  Weight: 63.6 kg   BMI: 20.8 kg / m2  Dosing Weight: 64.5 kg - admission weight used for TPN  Comments/Average Daily Weight Gain: Over the past week the pt's weight has remained stable, fluctuating between 62.6-63.6 kg.    CURRENT NUTRITION ORDERS  Orders Placed This Encounter      Regular Diet Adult    CURRENT NUTRITION SUPPORT   Parenteral Nutrition:  Type of Parenteral Access: Central  PN frequency: Continuous    PN of 1800mLs, 236 g Dex, GIR of 2.54 mg/kg/min, 100g Amino Acids, (1.55 g/kg), 240 mL lipids, 0.8 g/kg for 1670 kcals, (26 kcal/kg) with 29 % of kcal from lipids. PN is meeting 100% of kcal needs and 100% of protein needs.    Intake/Tolerance: Per conversation with patient he reported that his weight has remained stable as he is on TPN. He states he likes softer foods right now due to the mouth sores, nausea, etc. He has been eating applesauce, yogurt, and other soft foods.     Per chart review, pt has had some nausea and vomiting over the past week with minimal intake.     TPN meeting 100% of assessed needs at this time, pt tolerating well.    Current factors affecting nutrition intake include:decreased appetite, mouth sores, nausea and vomiting    NEW FINDINGS:  BMT #2 Day +9    LABS  Labs reviewed    - elevated    MEDICATIONS  Medications reviewed    ASSESSED NUTRITION NEEDS:  Valentino Equation: BMR (1660) x 1.2 - 1.4 = 9669-4866 kcal   Estimated Energy Needs: 30-35 kcal/kg EN/PO; 25-30 kcal/kg PN; 25-35 kcal/kg PN+EN  Estimated Protein Needs: 1.5-2g/kg  Estimated Fluid Needs: 1 ml/kcal or per MD    Micronutrient Needs: per RDA    PEDIATRIC NUTRITION STATUS VALIDATION  Patient does not meet criteria for malnutrition.    EVALUATION OF PREVIOUS PLAN OF CARE:   Monitoring from previous assessment:  Food and Beverage intake - limited po intake with nausea, vomiting and mouth sores. Pt willing to try magic cup  today to see how he likes that as a soft food option.  Enteral and parenteral nutrition intake - TPN currently meeting 100% of assessed needs   Anthropometric measurements - Over the past week the pt's weight has remained stable, fluctuating between 62.6-63.6 kg.    Previous Goals:   1. Po and/or nutrition support to meet greater than 75% of needs - met  2. Weight maintenance during hospital stay - met    Previous Nutrition Diagnosis:   Predicted suboptimal nutrient intake related to decline in po as evidence by reliance on TPN to meet estimated needs with potential for interruption.  Evaluation: No change    NUTRITION DIAGNOSIS:  Predicted suboptimal nutrient intake related to decline in po as evidence by reliance on TPN to meet estimated needs with potential for interruption.    INTERVENTIONS  Nutrition Prescription  Po/nutrition support to meet needs for age appropriate growth    Implementation:  Parenteral Nutrition -- continue with current regimen  Collaboration and Referral of Nutrition care -- discussed plan of care with team during rounds  Snacks -- sent magic cup for pt to trial, will check back on how pt liked magic cup and if he would like to receive them on a daily basis    Goals  1. Po and/or nutrition support to meet greater than 75% of needs  2. Weight maintenance during hospital stay    FOLLOW UP/MONITORING  Food and Beverage intake --  Enteral and parenteral nutrition intake --  Anthropometric measurements --    Tita Guillen RDN, [place]  Pager: [phone]

## 2021-02-25 NOTE — PLAN OF CARE
Pt. Afebrile- AOVSS. Lungs clear. Pt. Denies pain- scheduled morphine working well. Pt. States left side of throat sore- doing oral cares with encouragement. Tacro gtt continues. Good UO, no stool. Attempted ice cream but has had no appetite. Mom at bedside assisting in cares.

## 2021-02-25 NOTE — PLAN OF CARE
Patient has been afebrile and other vitals stable.  Offered no complaints of pain or nausea.  Continues to get Morphine scheduled every 4hrs with relief.  Good urine output.  Hourly rounding completed.

## 2021-02-25 NOTE — PROGRESS NOTES
Pediatric BMT Daily Progress Note    Interval Events: Day +9 today, remains afebrile and clinically stable while awaiting count recovery. No epistaxis. Pain and nausea well controlled with supportive medications.    Review of Systems: Pertinent positives include those mentioned in interval events. A complete review of systems was performed and is otherwise negative.      Medications:  Please see MAR    Physical Exam:    Temp:  [97.6  F (36.4  C)-99.1  F (37.3  C)] 97.6  F (36.4  C)  Pulse:  [] 86  Resp:  [16-18] 16  BP: ()/(58-68) 101/62  SpO2:  [97 %-100 %] 97 %     I/O last 3 completed shifts:  In: 3484.46 [I.V.:1182.46]  Out: 2921 [Urine:2414; Emesis/NG output:507]  GEN: Interactive, pleasant and cooperative, lying in bed. NAD. Mom present.   HEENT: Head NC/AT, PERRL, EOMs intact, sclerae clear, nares patent, MMM. Erythema and ridging of lateral tongue and interior cheeks.   CARD: RRR, no murmur.  RESP: Lungs clear to auscultation bilaterally. Normal work of breathing.  ABD: Soft, NT, ND, NABS present, no organomegaly or masses noted.   EXTREM: WWP, MAEE  SKIN: Dry skin; resolving scattered erythematous papules on mid-chest. Barbie-area not examined today.  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 Afebrile and clinically stable. C.Diff positive 2/17 for which he is on oral vancomycin. Epistaxis improved with increased platelet parameters and afrin. Pain and nausea under good control with supportive medications.     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 S/p preparative regimen per [id number] with Thiotepa (days -7 trough -6), Fludarabine (days -5 through -3), Busulfan (days -5 through -3), ATG (days -4 through -2) followed by UCBT (2/16). - Awaiting count recovery.  - Disease (BMBx) and donor evaluations due day +21     # Risk for GVHD:   - Continue Tacrolimus, goal 10-15 through day +14. Daily levels, pending from today.  - Continue MMF-- levels WNL.     FEN/Renal:  # Risk for malnutrition: intake minimal  - Regular diet as tolerated  - Dietician to follow  - Continue TPN/IL    # Electrolyte abnormalities:    - Check daily electrolytes   - Replace/adjust in TPN as indicated     # Risk for renal dysfunction and fluid overload: GFR (1/28): 119 mls/min  - Monitor I/O's and daily weights     # Risk for aHUS/TA-TMA:    - Monitor LDH qMon/Thurs: 124 (2/25)  - Monitor urine protein/creatinine qTuesday: 0.23 (2/23)     Pulmonary:  # Risk for pulmonary insufficiency: No current concerns    Cardiovascular:  # Asymptomatic WPW syndrome (diagnosed 2018): no interventions to date. Most recent EKG (1/27). ECHO (1/26) normal, EF 64%. 24h Zio patch holter (1/27-1/28) revealed c/w WPW with no ventricular ectopies present. Cardiology following. BNP elevated 664 (2/16) likely attributed to fluid shifts per cardiology. BNP threshold of concern >5000.  - Obtain weekly BNP (improved, WNL at 20 2/23) and troponin (<0.015 2/23).  Next due 3/2.  - Follow up ECHO in 6 mos (7/2021)     # Risk for hypertension secondary to medications: normotensive currently, monitoring     Heme:   # Pancytopenia secondary to chemotherapy  - Transfuse for hemoglobin < 7, platelets parameter from 10 to 20k and check twice daily (see epistaxis below)  - Hx of transfusion reactions (hives): Premedicate with tylenol and benadryl prior to pRBCs and plts  - GCSF daily until ANC >/= 2500 x 3 consecutive days    # Recent hx of epistaxis:   - Continue 3 day  course of afrin (2/23-2/25) -- final day today. Continue PRN.  - Maintain plt parameter at 20K  - Platelet check twice daily     Infectious Disease:  # Fever in context of BMT conditioning and central access (2/13):  - Follow Blood cxs (NGTD). Of note, also has un-accessed port-a-cath.   - Continue empiric Cefepime through count recovery    # C.Diff (2/17): continue 10d course of oral vancomycin through 2/26    # Risk for infection given immunocompromised status with need for prophylaxis:  - Viral (CMV/HSV sero positive): HD Acyclovir. Weekly CMV neg 2/23.  - Fungal: Micafungin. Transition to either itraconazole or posaconazole when tolerating PO.  - Bacterial: Cefepime as above  - PCP: Pentamidine given 2/15. Consider Bactrim next month if WBC criteria met (no hx of adverse reaction).  - Hypogammaglobinemia: IgG 496 (2/10)-- repeat IgG tomorrow (about every 2 weeks)     Past infections: C. Diff (November 2020)     GI:   # Reflux:  - BID protonix   - PRN Tums     # Nausea: s/p 3d course of emend x2 (2/16-2/18 and 2/21-2/23)   - Ativan q6h  - Benadryl prn     # Risk for VOD/elastography study participant: most recent abd US (2/22) revealed stable, mild hepatomegaly  - Ursodiol TID   - Labs and imaging per study     Neuro:  # Mucositis/abdominal pain:    - Tylenol PRN  - Mugard QID PRN- home medication  - Morphine 2 mg q4H scheduled with 1 mg PRN q2H (not PCA as he does not want to wear pulse ox)  - Encourage warm packs     # History of significant spinal headaches: consider using Candelario needle with lumbar punctures    # CRS history. 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 No CRS, neurotoxicity or infectious complications from [place] CAR-T. Continue Q 2 weeks IGG levels     Derm:  # Barbie-area rash: nystatin ointment BID (started 2/23)    Fertility: Sperm collected previously collected for fertility preservation     Discharge  Considerations: Expected lengths of hospitalization for patients undergoing stem cell transplantation vary by primary diagnosis, conditioning regimen, graft source, and development of complications. A typical stay is 6 weeks.    The above plan of care was developed by and communicated to me by the Pediatric BMT attending physician, Amisha Senior MD.    Jacqueline Shin, BENJA-AC  [facility]  [id number] [place] [address]  [phone]  [phone]        BMT Attending Note:    Artemio was seen and evaluated by me today.     The significant interval history includes: No further bloody noses after starting afrin. Continues with some throat pain, but still does not want to start a PCA.     I have reviewed changes and data from the last 24 hours, including medications, laboratory results and vital signs.     I have formulated and discussed the plan with the BMT team. I discussed the course and plan with the patient/family and answered all of their questions to the best of my ability. I counseled them regarding the followin y/o M w/ h/o multiply relapsed Pre-B cell ALL (s/p MSD BMT, CART-19, CART-22) in CR3 here for consolidation of remission with MAC (FluBuTT ATG) conditioning, now S/P UCBT, awaiting count recovery, at risk for GVHD, at high risk for opportunistic infection, fever with ATG, C/ difficile colitis on oral vancomycin, at risk for malnutrition, nausea, mucositis, at risk for VOD, at risk for gastritis, WPW, recent epistaxis and anticipatory guidance re: expected count recovery and other potential transplant related complications.     My care coordination activities today include oversight of planned lab studies, oversight of medication changes and discussion with BMT team-members.    My total floor time today was greater than 35 minutes, at least 50% of which was counseling and coordination of  care.    Jaquelin Senior MD    Pediatric Blood and Marrow Transplant      Patient Active Problem List   Diagnosis     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)     Aaron-Parkinson-White (WPW) syndrome     Bone marrow transplant candidate     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)     At risk for infection     S/P allogeneic bone marrow transplant (H)     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     Examination of participant or control in clinical research

## 2021-02-26 ENCOUNTER — APPOINTMENT (OUTPATIENT)
Dept: ULTRASOUND IMAGING | Facility: CLINIC | Age: 21
DRG: 014 | End: 2021-02-26
Attending: PEDIATRICS
Payer: COMMERCIAL

## 2021-02-26 LAB
ABO + RH BLD: NORMAL
ANION GAP SERPL CALCULATED.3IONS-SCNC: 7 MMOL/L (ref 3–14)
BLD GP AB SCN SERPL QL: NORMAL
BLD GP AB SCN SERPL QL: NORMAL
BLD PROD TYP BPU: NORMAL
BLD UNIT ID BPU: 0
BLD UNIT ID BPU: 0
BLOOD BANK CMNT PATIENT-IMP: NORMAL
BLOOD BANK CMNT PATIENT-IMP: NORMAL
BLOOD PRODUCT CODE: NORMAL
BLOOD PRODUCT CODE: NORMAL
BPU ID: NORMAL
BPU ID: NORMAL
BUN SERPL-MCNC: 19 MG/DL (ref 7–30)
CALCIUM SERPL-MCNC: 8.9 MG/DL (ref 8.5–10.1)
CHLORIDE SERPL-SCNC: 106 MMOL/L (ref 94–109)
CO2 SERPL-SCNC: 25 MMOL/L (ref 20–32)
CREAT SERPL-MCNC: 0.52 MG/DL (ref 0.66–1.25)
DIFFERENTIAL METHOD BLD: ABNORMAL
ERYTHROCYTE [DISTWIDTH] IN BLOOD BY AUTOMATED COUNT: 10.4 % (ref 10–15)
GFR SERPL CREATININE-BSD FRML MDRD: >90 ML/MIN/{1.73_M2}
GLUCOSE SERPL-MCNC: 118 MG/DL (ref 70–99)
HCT VFR BLD AUTO: 17.9 % (ref 40–53)
HGB BLD-MCNC: 6.5 G/DL (ref 13.3–17.7)
IGG SERPL-MCNC: 433 MG/DL (ref 610–1616)
MAGNESIUM SERPL-MCNC: 1.7 MG/DL (ref 1.6–2.3)
MCH RBC QN AUTO: 30.8 PG (ref 26.5–33)
MCHC RBC AUTO-ENTMCNC: 36.3 G/DL (ref 31.5–36.5)
MCV RBC AUTO: 85 FL (ref 78–100)
NUM BPU REQUESTED: 1
NUM BPU REQUESTED: 1
PHOSPHATE SERPL-MCNC: 4.5 MG/DL (ref 2.5–4.5)
PLATELET # BLD AUTO: 14 10E9/L (ref 150–450)
PLATELET # BLD AUTO: 28 10E9/L (ref 150–450)
POTASSIUM SERPL-SCNC: 4.2 MMOL/L (ref 3.4–5.3)
RBC # BLD AUTO: 2.11 10E12/L (ref 4.4–5.9)
SODIUM SERPL-SCNC: 138 MMOL/L (ref 133–144)
SPECIMEN EXP DATE BLD: NORMAL
SPECIMEN EXP DATE BLD: NORMAL
TACROLIMUS BLD-MCNC: 11.4 UG/L (ref 5–15)
TME LAST DOSE: NORMAL H
TRANSFUSION STATUS PATIENT QL: NORMAL
VWF:AC ACT/NOR PPP IA: 356 % (ref 50–180)
WBC # BLD AUTO: 0 10E9/L (ref 4–11)

## 2021-02-26 PROCEDURE — 250N000009 HC RX 250: Performed by: PEDIATRICS

## 2021-02-26 PROCEDURE — 250N000009 HC RX 250: Performed by: NURSE PRACTITIONER

## 2021-02-26 PROCEDURE — 83735 ASSAY OF MAGNESIUM: CPT | Performed by: NURSE PRACTITIONER

## 2021-02-26 PROCEDURE — 82784 ASSAY IGA/IGD/IGG/IGM EACH: CPT | Performed by: NURSE PRACTITIONER

## 2021-02-26 PROCEDURE — 250N000011 HC RX IP 250 OP 636: Performed by: NURSE PRACTITIONER

## 2021-02-26 PROCEDURE — 85027 COMPLETE CBC AUTOMATED: CPT

## 2021-02-26 PROCEDURE — 93975 VASCULAR STUDY: CPT | Mod: 26 | Performed by: RADIOLOGY

## 2021-02-26 PROCEDURE — 86900 BLOOD TYPING SEROLOGIC ABO: CPT | Performed by: PEDIATRICS

## 2021-02-26 PROCEDURE — 258N000003 HC RX IP 258 OP 636: Performed by: PEDIATRICS

## 2021-02-26 PROCEDURE — 86850 RBC ANTIBODY SCREEN: CPT | Performed by: PEDIATRICS

## 2021-02-26 PROCEDURE — 206N000001 HC R&B BMT UMMC

## 2021-02-26 PROCEDURE — 250N000013 HC RX MED GY IP 250 OP 250 PS 637: Performed by: NURSE PRACTITIONER

## 2021-02-26 PROCEDURE — 250N000011 HC RX IP 250 OP 636: Performed by: PEDIATRICS

## 2021-02-26 PROCEDURE — 84100 ASSAY OF PHOSPHORUS: CPT | Performed by: NURSE PRACTITIONER

## 2021-02-26 PROCEDURE — 80048 BASIC METABOLIC PNL TOTAL CA: CPT | Performed by: NURSE PRACTITIONER

## 2021-02-26 PROCEDURE — 99233 SBSQ HOSP IP/OBS HIGH 50: CPT | Performed by: PEDIATRICS

## 2021-02-26 PROCEDURE — 86901 BLOOD TYPING SEROLOGIC RH(D): CPT | Performed by: PEDIATRICS

## 2021-02-26 PROCEDURE — 250N000013 HC RX MED GY IP 250 OP 250 PS 637: Performed by: REGISTERED NURSE

## 2021-02-26 PROCEDURE — 91200 LIVER ELASTOGRAPHY: CPT | Mod: TC

## 2021-02-26 PROCEDURE — P9037 PLATE PHERES LEUKOREDU IRRAD: HCPCS | Performed by: NURSE PRACTITIONER

## 2021-02-26 PROCEDURE — 85049 AUTOMATED PLATELET COUNT: CPT | Performed by: NURSE PRACTITIONER

## 2021-02-26 PROCEDURE — 93975 VASCULAR STUDY: CPT

## 2021-02-26 PROCEDURE — C9113 INJ PANTOPRAZOLE SODIUM, VIA: HCPCS | Performed by: NURSE PRACTITIONER

## 2021-02-26 PROCEDURE — 80197 ASSAY OF TACROLIMUS: CPT | Performed by: NURSE PRACTITIONER

## 2021-02-26 PROCEDURE — P9040 RBC LEUKOREDUCED IRRADIATED: HCPCS | Performed by: PEDIATRICS

## 2021-02-26 RX ADMIN — VANCOMYCIN HYDROCHLORIDE 125 MG: 125 CAPSULE ORAL at 20:15

## 2021-02-26 RX ADMIN — VANCOMYCIN HYDROCHLORIDE 125 MG: 125 CAPSULE ORAL at 16:33

## 2021-02-26 RX ADMIN — DIPHENHYDRAMINE HYDROCHLORIDE 50 MG: 50 INJECTION, SOLUTION INTRAMUSCULAR; INTRAVENOUS at 03:10

## 2021-02-26 RX ADMIN — MYCOPHENOLATE MOFETIL 960 MG: 500 INJECTION, POWDER, LYOPHILIZED, FOR SOLUTION INTRAVENOUS at 22:07

## 2021-02-26 RX ADMIN — VANCOMYCIN HYDROCHLORIDE 125 MG: 125 CAPSULE ORAL at 10:16

## 2021-02-26 RX ADMIN — PANTOPRAZOLE SODIUM 40 MG: 40 INJECTION, POWDER, FOR SOLUTION INTRAVENOUS at 08:39

## 2021-02-26 RX ADMIN — CEFEPIME 2 G: 2 INJECTION, POWDER, FOR SOLUTION INTRAVENOUS at 20:15

## 2021-02-26 RX ADMIN — MORPHINE SULFATE 2 MG: 2 INJECTION, SOLUTION INTRAMUSCULAR; INTRAVENOUS at 08:43

## 2021-02-26 RX ADMIN — Medication 5 ML: at 16:41

## 2021-02-26 RX ADMIN — ACYCLOVIR SODIUM 600 MG: 50 INJECTION, SOLUTION INTRAVENOUS at 08:39

## 2021-02-26 RX ADMIN — URSODIOL 600 MG: 300 CAPSULE ORAL at 14:48

## 2021-02-26 RX ADMIN — MORPHINE SULFATE 2 MG: 2 INJECTION, SOLUTION INTRAMUSCULAR; INTRAVENOUS at 04:29

## 2021-02-26 RX ADMIN — ACYCLOVIR SODIUM 600 MG: 50 INJECTION, SOLUTION INTRAVENOUS at 01:01

## 2021-02-26 RX ADMIN — LORAZEPAM 1 MG: 2 INJECTION INTRAMUSCULAR; INTRAVENOUS at 04:29

## 2021-02-26 RX ADMIN — LORAZEPAM 1 MG: 2 INJECTION INTRAMUSCULAR; INTRAVENOUS at 10:51

## 2021-02-26 RX ADMIN — MORPHINE SULFATE 2 MG: 2 INJECTION, SOLUTION INTRAMUSCULAR; INTRAVENOUS at 17:20

## 2021-02-26 RX ADMIN — DEXTROSE MONOHYDRATE 0.04 MG/KG/DAY: 5 INJECTION, SOLUTION INTRAVENOUS at 23:52

## 2021-02-26 RX ADMIN — Medication 315 MCG: at 19:37

## 2021-02-26 RX ADMIN — MYCOPHENOLATE MOFETIL 960 MG: 500 INJECTION, POWDER, LYOPHILIZED, FOR SOLUTION INTRAVENOUS at 14:49

## 2021-02-26 RX ADMIN — MYCOPHENOLATE MOFETIL 960 MG: 500 INJECTION, POWDER, LYOPHILIZED, FOR SOLUTION INTRAVENOUS at 06:43

## 2021-02-26 RX ADMIN — I.V. FAT EMULSION 240 ML: 20 EMULSION INTRAVENOUS at 19:39

## 2021-02-26 RX ADMIN — ACETAMINOPHEN 500 MG: 500 TABLET, FILM COATED ORAL at 03:10

## 2021-02-26 RX ADMIN — Medication 5 ML: at 20:22

## 2021-02-26 RX ADMIN — MORPHINE SULFATE 2 MG: 2 INJECTION, SOLUTION INTRAMUSCULAR; INTRAVENOUS at 13:15

## 2021-02-26 RX ADMIN — Medication 5 ML: at 13:20

## 2021-02-26 RX ADMIN — MORPHINE SULFATE 2 MG: 2 INJECTION, SOLUTION INTRAMUSCULAR; INTRAVENOUS at 21:08

## 2021-02-26 RX ADMIN — MICAFUNGIN SODIUM 50 MG: 50 INJECTION, POWDER, LYOPHILIZED, FOR SOLUTION INTRAVENOUS at 11:44

## 2021-02-26 RX ADMIN — VANCOMYCIN HYDROCHLORIDE 125 MG: 125 CAPSULE ORAL at 13:15

## 2021-02-26 RX ADMIN — PANTOPRAZOLE SODIUM 40 MG: 40 INJECTION, POWDER, FOR SOLUTION INTRAVENOUS at 19:38

## 2021-02-26 RX ADMIN — CEFEPIME 2 G: 2 INJECTION, POWDER, FOR SOLUTION INTRAVENOUS at 13:14

## 2021-02-26 RX ADMIN — ACETAMINOPHEN 500 MG: 500 TABLET, FILM COATED ORAL at 16:33

## 2021-02-26 RX ADMIN — ACYCLOVIR SODIUM 600 MG: 50 INJECTION, SOLUTION INTRAVENOUS at 23:52

## 2021-02-26 RX ADMIN — POTASSIUM CHLORIDE: 2 INJECTION, SOLUTION, CONCENTRATE INTRAVENOUS at 19:39

## 2021-02-26 RX ADMIN — CEFEPIME 2 G: 2 INJECTION, POWDER, FOR SOLUTION INTRAVENOUS at 05:49

## 2021-02-26 RX ADMIN — ACYCLOVIR SODIUM 600 MG: 50 INJECTION, SOLUTION INTRAVENOUS at 16:32

## 2021-02-26 RX ADMIN — LORAZEPAM 1 MG: 2 INJECTION INTRAMUSCULAR; INTRAVENOUS at 22:06

## 2021-02-26 RX ADMIN — URSODIOL 600 MG: 300 CAPSULE ORAL at 19:38

## 2021-02-26 RX ADMIN — URSODIOL 600 MG: 300 CAPSULE ORAL at 10:15

## 2021-02-26 RX ADMIN — LORAZEPAM 1 MG: 2 INJECTION INTRAMUSCULAR; INTRAVENOUS at 16:32

## 2021-02-26 RX ADMIN — MORPHINE SULFATE 2 MG: 2 INJECTION, SOLUTION INTRAMUSCULAR; INTRAVENOUS at 01:02

## 2021-02-26 RX ADMIN — DIPHENHYDRAMINE HYDROCHLORIDE 50 MG: 50 INJECTION, SOLUTION INTRAMUSCULAR; INTRAVENOUS at 16:56

## 2021-02-26 ASSESSMENT — MIFFLIN-ST. JEOR: SCORE: 1616.37

## 2021-02-26 NOTE — PLAN OF CARE
Cared for pt from [id number]. Complains of pain to neck. Able to manage with scheduled morphine. Emesis X 1 after trying to eat an applesauce. No stools. Shaved head, changed linens and clothes but refused bath/shower. Pale in color. Mom at bedside and active in cares. Will cont to monitor and notify of changes or concerns.

## 2021-02-26 NOTE — PLAN OF CARE
[id number]: Isael - Afebrile. Softer BP 80s/40s. OVSS. Lungs clear, diminished in the bases. Red blood cells transfused with premeds tylenol and benadryl with no issue. No N/V or pain. Good UOP. No stool. Tacro gtt continues unchanged. Isael slept throughout night. No family present. Hourly rounding completed. Continue with POC.

## 2021-02-26 NOTE — PROGRESS NOTES
Peds BMT Integrative Therapy Progress Note      Artemio Nino is a 20 year old male history of Acute lymphoblastic leukemia (ALL). Isael is receiving BMT #2.     BMT Transplant Date: BMT #2 ; Day 10 (2/16/21)    Isael has utilized our services throughout previous transplants. He also has experience with integrative therapies from [facility].     Assessment    Today Isael would like massage to his back. Isael is very tense across his back. He tells me that this causing him significant discomfort. We discussed the combination of PT, yoga, and massage to help relieve pain and strengthen muscles. He agrees to this as discharge gets closer.     Focus was on entire back.     Post Session Observation: Calm/Relaxed      Intervention    Integrative Therapy(ies) Provided: Massage and Topical Essential Oil Application: Ache Ease Blend 2% concentration in coconut carrier oil    Plan for Follow up    I plan to follow Isael throughout his transplant process.     Isael and I discussed that hypnosis will take time to make significant changes to improve his ability to swallow.    Claudia Floyd DNP (Mo), RN  Integrative Nurse Clinician  Pediatric Blood and Marrow Transplant  Integrative Therapy Program  Pager #: [phone]      Time Spent:45 minutes

## 2021-02-26 NOTE — PLAN OF CARE
Afebrile. LSC, diminished in the bases. Perfusing well. OVSS. No n/v. Pain being controlled with morphine. Voiding, no stool. Taking some sips of water but no appetite. Tylenol and benadryl premed for platelets. Tacro gtt unchanged. Mom at bedside. Hourly rounding complete. Continue with POC.

## 2021-02-26 NOTE — PROGRESS NOTES
Pediatric BMT Daily Progress Note    Interval Events: Day +10 today, remains afebrile. Complains of intermittent mucositis pain within his neck region but generally controlled with scheduled morphine.     Review of Systems: Pertinent positives include those mentioned in interval events. A complete review of systems was performed and is otherwise negative.      Medications:  Please see MAR    Physical Exam:    Temp:  [98.1  F (36.7  C)-99  F (37.2  C)] 98.5  F (36.9  C)  Pulse:  [81-97] 81  Resp:  [16-18] 18  BP: ()/(47-63) 101/59  SpO2:  [98 %-100 %] 99 %     I/O last 3 completed shifts:  In: 3609.59 [I.V.:1249.59]  Out: 2583 [Urine:2383; Emesis/NG output:200]  GEN: Interactive, pleasant and cooperative, lying in bed. NAD. Mom present and step mother on phone  HEENT: Head NC/AT, PERRL, EOMs intact, sclerae clear, nares patent, MMM. Mild erythema and ridging of lateral tongue and interior cheeks, no obvious lesions.   CARD: RRR, no murmur.  RESP: Lungs clear to auscultation bilaterally. Normal work of breathing.  ABD: Soft, NT, ND, NABS present, no organomegaly or masses noted.   EXTREM: WWP, MAEE  SKIN: Dry skin; mild erythema surrounding Burns Biopatch. Barbie-area not examined today.  NEURO: No focal deficits  ACCESS: Double lumen burns, port (not accessed)    Labs: All reviewed with pertinent positives: Hgb 6.5, Plts 28k; BUN 19, Creat 0.52    Assessment and Plan: Artemio is a 19 yo male with history of multiply relapsed ALL now day +10 s/p UCBT following MAC per 2013-09. Afebrile and clinically stable. C.Diff positive 2/17 for which he will complete oral vancomycin today. Epistaxis improved with increased platelet parameters and afrin. Pain and nausea under good control with supportive medications.     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 S/p preparative regimen per [id number] with Thiotepa (days -7 trough -6), Fludarabine (days -5 through -3), Busulfan (days -5 through -3), ATG (days -4 through -2) followed by UCBT (2/16).   - Awaiting count recovery.  - Disease (BMBx) and donor evaluations due day +21     # Risk for GVHD:   - Continue Tacrolimus, goal 10-15 through day +14. Daily levels, pending from today. Drip increased yesterday for level 8.8  - Continue MMF-- levels WNL.     FEN/Renal:  # Risk for malnutrition: intake minimal  - Regular diet as tolerated  - Dietician to follow  - Continue TPN/IL    # Electrolyte abnormalities:    - Check daily electrolytes   - Replace/adjust in TPN as indicated     # Risk for renal dysfunction and fluid overload: GFR (1/28): 119 mls/min  - Monitor I/O's and daily weights     # Risk for aHUS/TA-TMA:    - Monitor LDH qMon/Thurs: 124 (2/25)  - Monitor urine protein/creatinine qTuesday: 0.23 (2/23)     Pulmonary:  # Risk for pulmonary insufficiency: No current concerns    Cardiovascular:  # Asymptomatic WPW syndrome (diagnosed 2018): no interventions to date. Most recent EKG (1/27). ECHO (1/26) normal, EF 64%. 24h Zio patch holter (1/27-1/28) revealed c/w WPW with no ventricular ectopies present. Cardiology following. BNP elevated 664 (2/16) likely attributed to fluid shifts per cardiology. BNP threshold of concern >5000.  - Obtain weekly BNP (improved, WNL at 20 2/23) and troponin (<0.015 2/23).  Next due 3/2.  - Follow up ECHO in 6 mos (7/2021)     # Risk for hypertension secondary to medications: normotensive currently, monitoring     Heme:   # Pancytopenia secondary to chemotherapy  - Transfuse for hemoglobin < 7, platelets parameter from 10 to 20k and check twice daily (see epistaxis below)  - Hx of transfusion reactions (hives): Premedicate with tylenol and benadryl prior to pRBCs and plts  - GCSF daily until ANC  >/= 2500 x 3 consecutive days    # Recent hx of epistaxis:   - Continue 3 day course of afrin (2/23-2/25) -- Continue PRN.  - Maintain plt parameter at 20K  - Platelet check twice daily     Infectious Disease:  # Fever in context of BMT conditioning and central access (2/13):  - Of note, also has un-accessed port-a-cath.   - Continue empiric Cefepime through count recovery    # C.Difficile colitis: Diagnosed 2/17  - 10d course of oral vancomycin through 2/26    # Risk for infection given immunocompromised status with need for prophylaxis:  - Viral (CMV/HSV sero positive): HD Acyclovir. Weekly CMV neg 2/23.  - Fungal: Micafungin. Transition to either itraconazole or posaconazole when tolerating PO.  - Bacterial: Cefepime as above  - PCP: Pentamidine given 2/15. Consider Bactrim next month if WBC criteria met (no hx of adverse reaction).  - Hypogammaglobinemia: IgG 496 (2/10)-- repeat IgG pending 2/26 (about every 2 weeks)     Past infections: C. Diff (November 2020)     GI:   # Reflux:  - BID protonix   - PRN Tums     # Nausea: s/p 3d course of emend x2 (2/16-2/18 and 2/21-2/23)   - Ativan q6h  - Benadryl prn     # Risk for VOD/elastography study participant: most recent abd US (2/22) revealed stable, mild hepatomegaly  - Ursodiol TID   - Labs and imaging per study     Neuro:  # Mucositis/abdominal pain:    - Tylenol PRN  - Mugard QID PRN- home medication  - Morphine 2 mg q4H scheduled with 1 mg PRN q2H (not PCA as he does not want to wear pulse ox)  - Encourage warm packs     # History of significant spinal headaches: consider using Candelario needle with lumbar punctures    # CRS history. 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 No CRS, neurotoxicity or infectious complications from [place] CAR-T. Continue Q 2 weeks IGG levels     Derm:  # Barbie-area rash: nystatin ointment BID (started 2/23)    Fertility: Sperm collected previously  collected for fertility preservation     Discharge Considerations: Expected lengths of hospitalization for patients undergoing stem cell transplantation vary by primary diagnosis, conditioning regimen, graft source, and development of complications. A typical stay is 6 weeks.    The above plan of care was developed by and communicated to me by the Pediatric BMT attending physician, Amisha Senior MD.    BENJA Deleon  Pediatric Blood and Marrow Transplant Program  [facility]    BMT Attending Note:    Artemio was seen and evaluated by me today.     The significant interval history includes: No new issues. Afebrile. No epistaxis. Pain adequately managed.     I have reviewed changes and data from the last 24 hours, including medications, laboratory results and vital signs.     I have formulated and discussed the plan with the BMT team. I discussed the course and plan with the patient/family and answered all of their questions to the best of my ability. I counseled them regarding the followin y/o M w/ h/o multiply relapsed Pre-B cell ALL (s/p MSD BMT, CART-19, CART-22) in CR3 here for consolidation of remission with MAC (FluBuTT ATG) conditioning, now S/P UCBT, awaiting count recovery, at risk for GVHD, at high risk for opportunistic infection, fever with ATG, C/ difficile colitis, at risk for malnutrition, nausea, mucositis, at risk for VOD, at risk for gastritis, WPW, recent epistaxis and anticipatory guidance re: expected count recovery and other potential transplant related complications. Will check immune globulin level today and replace as needed. Completing 10 day course of oral vancomycin today.     My care coordination activities today include oversight of planned lab studies, oversight of medication changes and discussion with BMT team-members.    My total floor time today was greater than 35 minutes, at least 50% of which was counseling and coordination  of care.    Jaquelin Senior MD    Pediatric Blood and Marrow Transplant      Patient Active Problem List   Diagnosis     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)     Aaron-Parkinson-White (WPW) syndrome     Bone marrow transplant candidate     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)     At risk for infection     S/P allogeneic bone marrow transplant (H)     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     Examination of participant or control in clinical research

## 2021-02-27 LAB
ANION GAP SERPL CALCULATED.3IONS-SCNC: 6 MMOL/L (ref 3–14)
BLD PROD TYP BPU: NORMAL
BLD UNIT ID BPU: 0
BLOOD PRODUCT CODE: NORMAL
BPU ID: NORMAL
BUN SERPL-MCNC: 19 MG/DL (ref 7–30)
CALCIUM SERPL-MCNC: 9.2 MG/DL (ref 8.5–10.1)
CHLORIDE SERPL-SCNC: 106 MMOL/L (ref 94–109)
CO2 SERPL-SCNC: 27 MMOL/L (ref 20–32)
CREAT SERPL-MCNC: 0.62 MG/DL (ref 0.66–1.25)
DIFFERENTIAL METHOD BLD: ABNORMAL
ERYTHROCYTE [DISTWIDTH] IN BLOOD BY AUTOMATED COUNT: 10.6 % (ref 10–15)
GFR SERPL CREATININE-BSD FRML MDRD: >90 ML/MIN/{1.73_M2}
GLUCOSE SERPL-MCNC: 116 MG/DL (ref 70–99)
HCT VFR BLD AUTO: 20.8 % (ref 40–53)
HGB BLD-MCNC: 7.7 G/DL (ref 13.3–17.7)
Lab: NORMAL
Lab: NORMAL
MCH RBC QN AUTO: 31 PG (ref 26.5–33)
MCHC RBC AUTO-ENTMCNC: 37 G/DL (ref 31.5–36.5)
MCV RBC AUTO: 84 FL (ref 78–100)
PLATELET # BLD AUTO: 20 10E9/L (ref 150–450)
PLATELET # BLD AUTO: 31 10E9/L (ref 150–450)
POTASSIUM SERPL-SCNC: 4.5 MMOL/L (ref 3.4–5.3)
RBC # BLD AUTO: 2.48 10E12/L (ref 4.4–5.9)
SODIUM SERPL-SCNC: 139 MMOL/L (ref 133–144)
SPECIMEN SOURCE: NORMAL
SPECIMEN SOURCE: NORMAL
TACROLIMUS BLD-MCNC: 13 UG/L (ref 5–15)
TME LAST DOSE: NORMAL H
TRANSFUSION STATUS PATIENT QL: NORMAL
TRANSFUSION STATUS PATIENT QL: NORMAL
WBC # BLD AUTO: 0 10E9/L (ref 4–11)
YEAST SPEC QL CULT: NO GROWTH
YEAST SPEC QL CULT: NO GROWTH

## 2021-02-27 PROCEDURE — 250N000009 HC RX 250: Performed by: PEDIATRICS

## 2021-02-27 PROCEDURE — P9037 PLATE PHERES LEUKOREDU IRRAD: HCPCS | Performed by: NURSE PRACTITIONER

## 2021-02-27 PROCEDURE — 250N000013 HC RX MED GY IP 250 OP 250 PS 637: Performed by: NURSE PRACTITIONER

## 2021-02-27 PROCEDURE — 85049 AUTOMATED PLATELET COUNT: CPT | Performed by: NURSE PRACTITIONER

## 2021-02-27 PROCEDURE — 250N000011 HC RX IP 250 OP 636: Performed by: NURSE PRACTITIONER

## 2021-02-27 PROCEDURE — 250N000009 HC RX 250: Performed by: NURSE PRACTITIONER

## 2021-02-27 PROCEDURE — 258N000003 HC RX IP 258 OP 636: Performed by: PEDIATRICS

## 2021-02-27 PROCEDURE — 250N000011 HC RX IP 250 OP 636: Performed by: PEDIATRICS

## 2021-02-27 PROCEDURE — 80048 BASIC METABOLIC PNL TOTAL CA: CPT | Performed by: NURSE PRACTITIONER

## 2021-02-27 PROCEDURE — 85025 COMPLETE CBC W/AUTO DIFF WBC: CPT | Performed by: NURSE PRACTITIONER

## 2021-02-27 PROCEDURE — C9113 INJ PANTOPRAZOLE SODIUM, VIA: HCPCS | Performed by: NURSE PRACTITIONER

## 2021-02-27 PROCEDURE — 206N000001 HC R&B BMT UMMC

## 2021-02-27 PROCEDURE — 80197 ASSAY OF TACROLIMUS: CPT | Performed by: NURSE PRACTITIONER

## 2021-02-27 PROCEDURE — 99233 SBSQ HOSP IP/OBS HIGH 50: CPT | Performed by: PEDIATRICS

## 2021-02-27 PROCEDURE — 85027 COMPLETE CBC AUTOMATED: CPT

## 2021-02-27 RX ORDER — DIPHENHYDRAMINE HYDROCHLORIDE 50 MG/ML
25 INJECTION INTRAMUSCULAR; INTRAVENOUS ONCE
Status: COMPLETED | OUTPATIENT
Start: 2021-02-27 | End: 2021-02-27

## 2021-02-27 RX ADMIN — URSODIOL 600 MG: 300 CAPSULE ORAL at 14:38

## 2021-02-27 RX ADMIN — PANTOPRAZOLE SODIUM 40 MG: 40 INJECTION, POWDER, FOR SOLUTION INTRAVENOUS at 19:30

## 2021-02-27 RX ADMIN — DIPHENHYDRAMINE HYDROCHLORIDE 50 MG: 50 INJECTION, SOLUTION INTRAMUSCULAR; INTRAVENOUS at 13:15

## 2021-02-27 RX ADMIN — MYCOPHENOLATE MOFETIL 960 MG: 500 INJECTION, POWDER, LYOPHILIZED, FOR SOLUTION INTRAVENOUS at 14:38

## 2021-02-27 RX ADMIN — Medication 10 ML: at 12:30

## 2021-02-27 RX ADMIN — MORPHINE SULFATE 2 MG: 2 INJECTION, SOLUTION INTRAMUSCULAR; INTRAVENOUS at 13:15

## 2021-02-27 RX ADMIN — MORPHINE SULFATE 2 MG: 2 INJECTION, SOLUTION INTRAMUSCULAR; INTRAVENOUS at 09:27

## 2021-02-27 RX ADMIN — MORPHINE SULFATE 2 MG: 2 INJECTION, SOLUTION INTRAMUSCULAR; INTRAVENOUS at 01:13

## 2021-02-27 RX ADMIN — Medication 10 ML: at 16:26

## 2021-02-27 RX ADMIN — ACYCLOVIR SODIUM 600 MG: 50 INJECTION, SOLUTION INTRAVENOUS at 23:35

## 2021-02-27 RX ADMIN — ACYCLOVIR SODIUM 600 MG: 50 INJECTION, SOLUTION INTRAVENOUS at 16:26

## 2021-02-27 RX ADMIN — MORPHINE SULFATE 2 MG: 2 INJECTION, SOLUTION INTRAMUSCULAR; INTRAVENOUS at 05:40

## 2021-02-27 RX ADMIN — URSODIOL 600 MG: 300 CAPSULE ORAL at 19:29

## 2021-02-27 RX ADMIN — CEFEPIME 2 G: 2 INJECTION, POWDER, FOR SOLUTION INTRAVENOUS at 21:09

## 2021-02-27 RX ADMIN — POTASSIUM CHLORIDE: 2 INJECTION, SOLUTION, CONCENTRATE INTRAVENOUS at 20:11

## 2021-02-27 RX ADMIN — ACYCLOVIR SODIUM 600 MG: 50 INJECTION, SOLUTION INTRAVENOUS at 07:30

## 2021-02-27 RX ADMIN — I.V. FAT EMULSION 240 ML: 20 EMULSION INTRAVENOUS at 19:30

## 2021-02-27 RX ADMIN — MICAFUNGIN SODIUM 50 MG: 50 INJECTION, POWDER, LYOPHILIZED, FOR SOLUTION INTRAVENOUS at 11:16

## 2021-02-27 RX ADMIN — NYSTATIN: 100000 OINTMENT TOPICAL at 08:02

## 2021-02-27 RX ADMIN — MORPHINE SULFATE 2 MG: 2 INJECTION, SOLUTION INTRAMUSCULAR; INTRAVENOUS at 18:11

## 2021-02-27 RX ADMIN — DEXTROSE MONOHYDRATE 0.04 MG/KG/DAY: 5 INJECTION, SOLUTION INTRAVENOUS at 23:35

## 2021-02-27 RX ADMIN — ACETAMINOPHEN 500 MG: 500 TABLET, FILM COATED ORAL at 17:30

## 2021-02-27 RX ADMIN — DIPHENHYDRAMINE HYDROCHLORIDE 25 MG: 50 INJECTION, SOLUTION INTRAMUSCULAR; INTRAVENOUS at 18:34

## 2021-02-27 RX ADMIN — Medication 315 MCG: at 19:29

## 2021-02-27 RX ADMIN — MORPHINE SULFATE 2 MG: 2 INJECTION, SOLUTION INTRAMUSCULAR; INTRAVENOUS at 21:57

## 2021-02-27 RX ADMIN — URSODIOL 600 MG: 300 CAPSULE ORAL at 07:30

## 2021-02-27 RX ADMIN — Medication 5 ML: at 19:35

## 2021-02-27 RX ADMIN — MYCOPHENOLATE MOFETIL 960 MG: 500 INJECTION, POWDER, LYOPHILIZED, FOR SOLUTION INTRAVENOUS at 22:03

## 2021-02-27 RX ADMIN — LORAZEPAM 1 MG: 2 INJECTION INTRAMUSCULAR; INTRAVENOUS at 21:57

## 2021-02-27 RX ADMIN — CEFEPIME 2 G: 2 INJECTION, POWDER, FOR SOLUTION INTRAVENOUS at 04:15

## 2021-02-27 RX ADMIN — LORAZEPAM 1 MG: 2 INJECTION INTRAMUSCULAR; INTRAVENOUS at 04:15

## 2021-02-27 RX ADMIN — MYCOPHENOLATE MOFETIL 960 MG: 500 INJECTION, POWDER, LYOPHILIZED, FOR SOLUTION INTRAVENOUS at 05:40

## 2021-02-27 RX ADMIN — CEFEPIME 2 G: 2 INJECTION, POWDER, FOR SOLUTION INTRAVENOUS at 13:25

## 2021-02-27 RX ADMIN — LORAZEPAM 1 MG: 2 INJECTION INTRAMUSCULAR; INTRAVENOUS at 16:26

## 2021-02-27 RX ADMIN — LORAZEPAM 1 MG: 2 INJECTION INTRAMUSCULAR; INTRAVENOUS at 10:20

## 2021-02-27 RX ADMIN — PANTOPRAZOLE SODIUM 40 MG: 40 INJECTION, POWDER, FOR SOLUTION INTRAVENOUS at 07:29

## 2021-02-27 ASSESSMENT — MIFFLIN-ST. JEOR: SCORE: 1619.37

## 2021-02-27 NOTE — PLAN OF CARE
Artemio has been afebrile, vitals stable. Lung sounds clear.  No reports of pain.  Had one emesis early in the evening, but declined any PRNs.  Tacro gtt remains unchanged.  Urine intermittently dark travis.  Mom here in the evening, left overnight.  Will continue with POC and notify physician with concerns. Hourly rounding complete.

## 2021-02-27 NOTE — PROGRESS NOTES
Pediatric BMT Daily Progress Note    Interval Events: Day +11 today, remains afebrile without acute interval events. Mucositis discomfort remains well-controlled with scheduled morphine. One episode of emesis last evening.     Review of Systems: Pertinent positives include those mentioned in interval events. A complete review of systems was performed and is otherwise negative.      Medications:  Please see MAR    Physical Exam:    Temp:  [98.7  F (37.1  C)-99.4  F (37.4  C)] 98.9  F (37.2  C)  Pulse:  [] 91  Resp:  [16-18] 16  BP: ()/(50-69) 99/50  Cuff Mean (mmHg):  [72] 72  SpO2:  [98 %-99 %] 99 %     I/O last 3 completed shifts:  In: 3364.6 [I.V.:1124.6]  Out: 3025 [Urine:3025]  GEN: Interactive, smiling, pleasant, and cooperative. Lying supine in bed. NAD. Mother present at bedside.   HEENT: Head NC/AT, PERRL, EOMs intact, sclerae clear, nares patent, MMM. Mild erythema and ridging of lateral tongue and interior cheeks, no obvious lesions, no interim changes.   CARD: RRR, no murmur.  RESP: Lungs clear to auscultation bilaterally. Normal work of breathing.  ABD: Soft, NT, ND, NABS present, no organomegaly or masses noted.   EXTREM: WWP, MAEE  SKIN: Dry skin; mild maculopapular rash near CVL dressing on L chest, absent elsewhere. Barbie-area not examined today.  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 Afebrile and clinically stable. Pain and nausea under good control with supportive medications.     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 S/p preparative regimen per [id number] with Thiotepa (days -7 trough -6), Fludarabine (days -5 through -3), Busulfan (days -5 through -3), ATG (days -4 through -2) followed by UCBT (2/16).   - Awaiting count recovery.  - Disease (BMBx) and donor evaluations due day +21     # Risk for GVHD:   - Continue Tacrolimus, goal 10-15 through day +14. Daily levels, pending from today.  - Continue MMF-- levels WNL.     FEN/Renal:  # Risk for malnutrition: intake minimal  - Regular diet as tolerated  - Dietician to follow  - Continue TPN/IL    # Electrolyte abnormalities:    - Check daily electrolytes   - Replace/adjust in TPN as indicated     # Risk for renal dysfunction and fluid overload: GFR (1/28): 119 mls/min  - Monitor I/O's and daily weights     # Risk for aHUS/TA-TMA:    - Monitor LDH qMon/Thurs: 124 (2/25)  - Monitor urine protein/creatinine qTuesday: 0.23 (2/23)     Pulmonary:  # Risk for pulmonary insufficiency: No current concerns    Cardiovascular:  # Asymptomatic WPW syndrome (diagnosed 2018): no interventions to date. Most recent EKG (1/27). ECHO (1/26) normal, EF 64%. 24h Zio patch holter (1/27-1/28) revealed c/w WPW with no ventricular ectopies present. Cardiology following. BNP elevated 664 (2/16) likely attributed to fluid shifts per cardiology. BNP threshold of concern >5000.  - Obtain weekly BNP (improved, WNL at 20 2/23) and troponin (<0.015 2/23).  Next due 3/2.  - Follow up ECHO in 6 mos (7/2021)     # Risk for hypertension secondary to medications: normotensive currently, monitoring     Heme:   # Pancytopenia secondary to chemotherapy  - Transfuse for hemoglobin < 7, platelets parameter from 10 to 20k and check twice daily (see epistaxis below)  - Hx of transfusion reactions (hives): Premedicate with tylenol and benadryl prior to pRBCs and plts  - GCSF daily until ANC >/= 2500 x 3 consecutive days    # Recent hx of epistaxis:   - Completed 3 day course  of afrin (2/23-2/25) -- Continue PRN.  - Maintain plt parameter at 20K  - Platelet check twice daily-- if no further bleeding, consider decreasing to daily 2/28     Infectious Disease:  # Fever in context of BMT conditioning and central access (2/13):  - Of note, also has un-accessed port-a-cath.   - Continue empiric Cefepime through count recovery    # C.Difficile colitis: Diagnosed 2/17  - 10d course of oral vancomycin complete 2/26    # Risk for infection given immunocompromised status with need for prophylaxis:  - Viral (CMV/HSV sero positive): HD Acyclovir. Weekly CMV neg 2/23.  - Fungal: Micafungin. Transition to either itraconazole or posaconazole when tolerating PO.  - Bacterial: Cefepime as above  - PCP: Pentamidine given 2/15. Consider Bactrim next month if WBC criteria met (no hx of adverse reaction).  - Hypogammaglobinemia: IgG 433 2/26, continue checking q2 weeks and replace if <400 (no hx replacement at [facility])      Past infections: C. Diff (November 2020)     GI:   # Reflux:  - BID protonix   - PRN Tums     # Nausea: s/p 3d course of emend x2 (2/16-2/18 and 2/21-2/23)   - Ativan q6h  - Benadryl prn     # Risk for VOD/elastography study participant: most recent abd US (2/22) revealed stable, mild hepatomegaly  - Ursodiol TID   - Labs and imaging per study     Neuro:  # Mucositis/abdominal pain:    - Tylenol PRN  - Mugard QID PRN- home medication  - Morphine 2 mg q4H scheduled with 1 mg PRN q2H (not PCA as he does not want to wear pulse ox)  - Encourage warm packs     # History of significant spinal headaches: consider using Candelario needle with lumbar punctures    # CRS history. 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 No CRS, neurotoxicity or infectious complications from [place] CAR-T. Continue Q 2 weeks IGG levels     Derm:  # Barbie-area rash: nystatin ointment BID (started 2/23)    Fertility: Sperm collected  previously collected for fertility preservation     Discharge Considerations: Expected lengths of hospitalization for patients undergoing stem cell transplantation vary by primary diagnosis, conditioning regimen, graft source, and development of complications. A typical stay is 6 weeks.    The above plan of care was developed by and communicated to me by the Pediatric BMT attending physician, Amisha Senior MD.    SEBAS Christian (Flesher), PA-C  Pediatric Blood and Marrow Transplant Program  [facility]  Pager: [phone]  Fax: [phone]    BMT Attending Note:    rAtemio was seen and evaluated by me today.     The significant interval history includes: Afebrile. Pain and nausea adequately managed.     I have reviewed changes and data from the last 24 hours, including medications, laboratory results and vital signs.     I have formulated and discussed the plan with the BMT team. I discussed the course and plan with the patient/family and answered all of their questions to the best of my ability. I counseled them regarding the followin y/o M w/ h/o multiply relapsed Pre-B cell ALL (s/p MSD BMT, CART-19, CART-22) in CR3 here for consolidation of remission with MAC (FluBuTT ATG) conditioning, now S/P UCBT, awaiting count recovery, at risk for GVHD, at high risk for opportunistic infection, fever with ATG, C/ difficile colitis, at risk for malnutrition, nausea, mucositis, at risk for VOD, at risk for gastritis, WPW, recent epistaxis and anticipatory guidance re: expected count recovery and other potential transplant related complications.     My care coordination activities today include oversight of planned lab studies, oversight of medication changes and discussion with BMT team-members.    My total floor time today was greater than 35 minutes, at least 50% of which was counseling and coordination of care.    Jaquelin Senior MD    Pediatric  Blood and Marrow Transplant      Patient Active Problem List   Diagnosis     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)     Aaron-Parkinson-White (WPW) syndrome     Bone marrow transplant candidate     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)     At risk for infection     S/P allogeneic bone marrow transplant (H)     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     Examination of participant or control in clinical research

## 2021-02-28 LAB
ANION GAP SERPL CALCULATED.3IONS-SCNC: 5 MMOL/L (ref 3–14)
BUN SERPL-MCNC: 19 MG/DL (ref 7–30)
CALCIUM SERPL-MCNC: 9.2 MG/DL (ref 8.5–10.1)
CHLORIDE SERPL-SCNC: 105 MMOL/L (ref 94–109)
CO2 SERPL-SCNC: 28 MMOL/L (ref 20–32)
CREAT SERPL-MCNC: 0.57 MG/DL (ref 0.66–1.25)
DIFFERENTIAL METHOD BLD: ABNORMAL
ERYTHROCYTE [DISTWIDTH] IN BLOOD BY AUTOMATED COUNT: 10.3 % (ref 10–15)
GFR SERPL CREATININE-BSD FRML MDRD: >90 ML/MIN/{1.73_M2}
GLUCOSE SERPL-MCNC: 118 MG/DL (ref 70–99)
HCT VFR BLD AUTO: 24.5 % (ref 40–53)
HGB BLD-MCNC: 9.1 G/DL (ref 13.3–17.7)
MCH RBC QN AUTO: 30.8 PG (ref 26.5–33)
MCHC RBC AUTO-ENTMCNC: 37.1 G/DL (ref 31.5–36.5)
MCV RBC AUTO: 83 FL (ref 78–100)
PLATELET # BLD AUTO: 33 10E9/L (ref 150–450)
POTASSIUM SERPL-SCNC: 4.4 MMOL/L (ref 3.4–5.3)
RBC # BLD AUTO: 2.95 10E12/L (ref 4.4–5.9)
SODIUM SERPL-SCNC: 138 MMOL/L (ref 133–144)
TACROLIMUS BLD-MCNC: 13.6 UG/L (ref 5–15)
TME LAST DOSE: NORMAL H
TRIGL SERPL-MCNC: 52 MG/DL
WBC # BLD AUTO: 0 10E9/L (ref 4–11)

## 2021-02-28 PROCEDURE — 84478 ASSAY OF TRIGLYCERIDES: CPT | Performed by: PEDIATRICS

## 2021-02-28 PROCEDURE — 206N000001 HC R&B BMT UMMC

## 2021-02-28 PROCEDURE — 99233 SBSQ HOSP IP/OBS HIGH 50: CPT | Performed by: PEDIATRICS

## 2021-02-28 PROCEDURE — 250N000009 HC RX 250: Performed by: NURSE PRACTITIONER

## 2021-02-28 PROCEDURE — 258N000001 HC RX 258: Performed by: NURSE PRACTITIONER

## 2021-02-28 PROCEDURE — 258N000003 HC RX IP 258 OP 636: Performed by: PEDIATRICS

## 2021-02-28 PROCEDURE — 250N000011 HC RX IP 250 OP 636: Performed by: NURSE PRACTITIONER

## 2021-02-28 PROCEDURE — 80197 ASSAY OF TACROLIMUS: CPT | Performed by: NURSE PRACTITIONER

## 2021-02-28 PROCEDURE — 250N000009 HC RX 250: Performed by: PEDIATRICS

## 2021-02-28 PROCEDURE — 250N000011 HC RX IP 250 OP 636: Performed by: PEDIATRICS

## 2021-02-28 PROCEDURE — C9113 INJ PANTOPRAZOLE SODIUM, VIA: HCPCS | Performed by: NURSE PRACTITIONER

## 2021-02-28 PROCEDURE — 85027 COMPLETE CBC AUTOMATED: CPT

## 2021-02-28 PROCEDURE — 250N000013 HC RX MED GY IP 250 OP 250 PS 637: Performed by: NURSE PRACTITIONER

## 2021-02-28 PROCEDURE — 80048 BASIC METABOLIC PNL TOTAL CA: CPT | Performed by: NURSE PRACTITIONER

## 2021-02-28 RX ORDER — GLY/CARB H.POLYMR A/POT HYDROX
5-10 SOLUTION, ORAL MUCOUS MEMBRANE 4 TIMES DAILY PRN
Status: DISCONTINUED | OUTPATIENT
Start: 2021-02-28 | End: 2021-03-12

## 2021-02-28 RX ADMIN — NYSTATIN: 100000 OINTMENT TOPICAL at 08:00

## 2021-02-28 RX ADMIN — MORPHINE SULFATE 2 MG: 2 INJECTION, SOLUTION INTRAMUSCULAR; INTRAVENOUS at 18:34

## 2021-02-28 RX ADMIN — URSODIOL 600 MG: 300 CAPSULE ORAL at 20:08

## 2021-02-28 RX ADMIN — LORAZEPAM 1 MG: 2 INJECTION INTRAMUSCULAR; INTRAVENOUS at 10:14

## 2021-02-28 RX ADMIN — DIPHENHYDRAMINE HYDROCHLORIDE 50 MG: 50 INJECTION, SOLUTION INTRAMUSCULAR; INTRAVENOUS at 21:17

## 2021-02-28 RX ADMIN — LORAZEPAM 1 MG: 2 INJECTION INTRAMUSCULAR; INTRAVENOUS at 15:43

## 2021-02-28 RX ADMIN — MORPHINE SULFATE 2 MG: 2 INJECTION, SOLUTION INTRAMUSCULAR; INTRAVENOUS at 22:39

## 2021-02-28 RX ADMIN — Medication 315 MCG: at 20:08

## 2021-02-28 RX ADMIN — ACYCLOVIR SODIUM 600 MG: 50 INJECTION, SOLUTION INTRAVENOUS at 15:43

## 2021-02-28 RX ADMIN — LORAZEPAM 1 MG: 2 INJECTION INTRAMUSCULAR; INTRAVENOUS at 22:39

## 2021-02-28 RX ADMIN — DEXTROSE MONOHYDRATE 0.04 MG/KG/DAY: 5 INJECTION, SOLUTION INTRAVENOUS at 20:44

## 2021-02-28 RX ADMIN — LORAZEPAM 1 MG: 2 INJECTION INTRAMUSCULAR; INTRAVENOUS at 04:20

## 2021-02-28 RX ADMIN — CEFEPIME 2 G: 2 INJECTION, POWDER, FOR SOLUTION INTRAVENOUS at 12:48

## 2021-02-28 RX ADMIN — Medication 10 ML: at 13:56

## 2021-02-28 RX ADMIN — MYCOPHENOLATE MOFETIL 960 MG: 500 INJECTION, POWDER, LYOPHILIZED, FOR SOLUTION INTRAVENOUS at 22:40

## 2021-02-28 RX ADMIN — MICAFUNGIN SODIUM 50 MG: 50 INJECTION, POWDER, LYOPHILIZED, FOR SOLUTION INTRAVENOUS at 10:13

## 2021-02-28 RX ADMIN — MORPHINE SULFATE 2 MG: 2 INJECTION, SOLUTION INTRAMUSCULAR; INTRAVENOUS at 10:13

## 2021-02-28 RX ADMIN — URSODIOL 600 MG: 300 CAPSULE ORAL at 13:55

## 2021-02-28 RX ADMIN — POTASSIUM CHLORIDE: 2 INJECTION, SOLUTION, CONCENTRATE INTRAVENOUS at 19:38

## 2021-02-28 RX ADMIN — MYCOPHENOLATE MOFETIL 960 MG: 500 INJECTION, POWDER, LYOPHILIZED, FOR SOLUTION INTRAVENOUS at 13:56

## 2021-02-28 RX ADMIN — Medication 10 ML: at 08:00

## 2021-02-28 RX ADMIN — PANTOPRAZOLE SODIUM 40 MG: 40 INJECTION, POWDER, FOR SOLUTION INTRAVENOUS at 08:01

## 2021-02-28 RX ADMIN — MYCOPHENOLATE MOFETIL 960 MG: 500 INJECTION, POWDER, LYOPHILIZED, FOR SOLUTION INTRAVENOUS at 06:15

## 2021-02-28 RX ADMIN — CEFEPIME 2 G: 2 INJECTION, POWDER, FOR SOLUTION INTRAVENOUS at 20:07

## 2021-02-28 RX ADMIN — PANTOPRAZOLE SODIUM 40 MG: 40 INJECTION, POWDER, FOR SOLUTION INTRAVENOUS at 19:57

## 2021-02-28 RX ADMIN — MORPHINE SULFATE 2 MG: 2 INJECTION, SOLUTION INTRAMUSCULAR; INTRAVENOUS at 02:10

## 2021-02-28 RX ADMIN — MORPHINE SULFATE 2 MG: 2 INJECTION, SOLUTION INTRAMUSCULAR; INTRAVENOUS at 13:56

## 2021-02-28 RX ADMIN — ACYCLOVIR SODIUM 600 MG: 50 INJECTION, SOLUTION INTRAVENOUS at 08:01

## 2021-02-28 RX ADMIN — I.V. FAT EMULSION 240 ML: 20 EMULSION INTRAVENOUS at 19:38

## 2021-02-28 RX ADMIN — URSODIOL 600 MG: 300 CAPSULE ORAL at 08:01

## 2021-02-28 RX ADMIN — MORPHINE SULFATE 2 MG: 2 INJECTION, SOLUTION INTRAMUSCULAR; INTRAVENOUS at 06:15

## 2021-02-28 RX ADMIN — DEXTROSE AND SODIUM CHLORIDE: 5; 450 INJECTION, SOLUTION INTRAVENOUS at 19:38

## 2021-02-28 RX ADMIN — CEFEPIME 2 G: 2 INJECTION, POWDER, FOR SOLUTION INTRAVENOUS at 04:20

## 2021-02-28 ASSESSMENT — MIFFLIN-ST. JEOR: SCORE: 1616.37

## 2021-02-28 NOTE — PROGRESS NOTES
Pediatric BMT Daily Progress Note    Interval Events: Day +12 today. Remains afebrile without acute interval events, comfortable on current scheduled morphine settings. Denies questions or concerns this morning.     Review of Systems: Pertinent positives include those mentioned in interval events. A complete review of systems was performed and is otherwise negative.      Medications:  Please see MAR    Physical Exam:    Temp:  [98.8  F (37.1  C)-99.4  F (37.4  C)] 98.9  F (37.2  C)  Pulse:  [] 97  Resp:  [16-18] 18  BP: ()/(53-69) 96/54  Cuff Mean (mmHg):  [80] 80  SpO2:  [98 %-100 %] 98 %     I/O last 3 completed shifts:  In: 3280 [I.V.:945]  Out: 2689 [Urine:2689]  GEN: Laying supine in bed, awakens with conversation and is appropriately interactive, pleasant, and cooperative. NAD.   HEENT: Head NC/AT, PERRL, EOMs intact, sclerae clear, nares patent, MMM. Mild erythema and ridging of lateral tongue and interior cheeks, no obvious lesions, no interim changes.   CARD: RRR, no murmur.  RESP: Lungs clear to auscultation bilaterally. Normal work of breathing.  ABD: Soft, NT, ND, NABS present, no organomegaly or masses noted.   EXTREM: WWP, MAEE  SKIN: Dry skin; mild maculopapular rash near CVL dressing on L chest, absent elsewhere. Barbie-area not examined today.  NEURO: No focal deficits  ACCESS: Double lumen burns, port (not accessed)    Labs: All reviewed with pertinent positives: Hgb 9.1, Plts 33k; BUN 19, Creat 0.57    Assessment and Plan: Artemio is a 19 yo male with history of multiply relapsed ALL now day +12 s/p UCBT following MAC per 2013-09. Afebrile and clinically stable. Pain and nausea under good control with supportive medications.     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 S/p preparative regimen per [id number] with Thiotepa (days -7 trough -6), Fludarabine (days -5 through -3), Busulfan (days -5 through -3), ATG (days -4 through -2) followed by UCBT (2/16).   - Awaiting count recovery.  - Disease (BMBx) and donor evaluations due day +21     # Risk for GVHD:   - Continue Tacrolimus, goal 10-15 through day +14. Daily levels, pending from today.  - Continue MMF-- levels WNL.     FEN/Renal:  # Risk for malnutrition: intake minimal  - Regular diet as tolerated  - Dietician to follow  - Continue TPN/IL    # Electrolyte abnormalities:    - Check daily electrolytes   - Replace/adjust in TPN as indicated     # Risk for renal dysfunction and fluid overload: GFR (1/28): 119 mls/min  - Monitor I/O's and daily weights     # Risk for aHUS/TA-TMA:    - Monitor LDH qMon/Thurs: 124 (2/25)  - Monitor urine protein/creatinine qTuesday: 0.23 (2/23)     Pulmonary:  # Risk for pulmonary insufficiency: No current concerns    Cardiovascular:  # Asymptomatic WPW syndrome (diagnosed 2018): no interventions to date. Most recent EKG (1/27). ECHO (1/26) normal, EF 64%. 24h Zio patch holter (1/27-1/28) revealed c/w WPW with no ventricular ectopies present. Cardiology following. BNP elevated 664 (2/16) likely attributed to fluid shifts per cardiology. BNP threshold of concern >5000.  - Obtain weekly BNP (improved, WNL at 20 2/23) and troponin (<0.015 2/23).  Next due 3/2.  - Follow up ECHO in 6 mos (7/2021)     # Risk for hypertension secondary to medications: normotensive currently, monitoring     Heme:   # Pancytopenia secondary to chemotherapy  - Transfuse for hemoglobin < 7, platelets parameter from 10 to 20k and check twice daily (see epistaxis below)  - Hx of transfusion reactions (hives): Premedicate with tylenol and benadryl prior to pRBCs and plts  - GCSF daily until ANC >/= 2500 x 3 consecutive days    # Recent hx of epistaxis:   - Completed 3 day course of afrin  (2/23-2/25) -- Continue PRN.  - Maintain plt parameter at 20K  - Platelet check twice daily-- decrease to daily today 2/28     Infectious Disease:  # Fever in context of BMT conditioning and central access (2/13):  - Of note, also has un-accessed port-a-cath.   - Continue empiric Cefepime through count recovery    # C.Difficile colitis: Diagnosed 2/17  - 10d course of oral vancomycin complete 2/26    # Risk for infection given immunocompromised status with need for prophylaxis:  - Viral (CMV/HSV sero positive): HD Acyclovir. Weekly CMV neg 2/23.  - Fungal: Micafungin. Transition to either itraconazole or posaconazole when tolerating PO.  - Bacterial: Cefepime as above  - PCP: Pentamidine given 2/15. Consider Bactrim next month if WBC criteria met (no hx of adverse reaction).  - Hypogammaglobinemia: IgG 433 2/26, continue checking q2 weeks and replace if <400 (no hx replacement at [facility])      Past infections: C. Diff (November 2020)     GI:   # Reflux:  - BID protonix   - PRN Tums     # Nausea: s/p 3d course of emend x2 (2/16-2/18 and 2/21-2/23)   - Ativan q6h  - Benadryl prn     # Risk for VOD/elastography study participant: most recent abd US (2/22) revealed stable, mild hepatomegaly  - Ursodiol TID   - Labs and imaging per study     Neuro:  # Mucositis/abdominal pain:    - Tylenol PRN  - Mugard QID PRN- home medication  - Morphine 2 mg q4H scheduled with 1 mg PRN q2H (not PCA as he does not want to wear pulse ox)  - Encourage warm packs     # History of significant spinal headaches: consider using Candelario needle with lumbar punctures    # CRS history. 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 No CRS, neurotoxicity or infectious complications from [place] CAR-T. Continue Q 2 weeks IGG levels     Derm:  # Barbie-area rash: nystatin ointment BID (started 2/23)    Fertility: Sperm collected previously collected for fertility  preservation     Discharge Considerations: Expected lengths of hospitalization for patients undergoing stem cell transplantation vary by primary diagnosis, conditioning regimen, graft source, and development of complications. A typical stay is 6 weeks.    The above plan of care was developed by and communicated to me by the Pediatric BMT attending physician, Amisha Senior MD.    SEBAS Christian (Flesher), PARichaC  Pediatric Blood and Marrow Transplant Program  [facility]  Pager: [phone]  Fax: [phone]    BMT Attending Note:    Artemio was seen and evaluated by me today.     The significant interval history includes: Afebrile. No new issues.      I have reviewed changes and data from the last 24 hours, including medications, laboratory results and vital signs.     I have formulated and discussed the plan with the BMT team. I discussed the course and plan with the patient/family and answered all of their questions to the best of my ability. I counseled them regarding the followin y/o M w/ h/o multiply relapsed Pre-B cell ALL (s/p MSD BMT, CART-19, CART-22) in CR3 here for consolidation of remission with MAC (FluBuTT ATG) conditioning, now S/P UCBT, awaiting count recovery, at risk for GVHD, at high risk for opportunistic infection, fever with ATG, C/ difficile colitis, at risk for malnutrition, nausea, mucositis, at risk for VOD, at risk for gastritis, WPW, recent epistaxis and anticipatory guidance re: expected count recovery and other potential transplant related complications.     My care coordination activities today include oversight of planned lab studies, oversight of medication changes and discussion with BMT team-members.    My total floor time today was greater than 35 minutes, at least 50% of which was counseling and coordination of care.    Jaquelin Senior MD    Pediatric Blood and Marrow Transplant      Patient Active Problem  List   Diagnosis     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)     Aaron-Parkinson-White (WPW) syndrome     Bone marrow transplant candidate     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)     At risk for infection     S/P allogeneic bone marrow transplant (H)     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     Examination of participant or control in clinical research

## 2021-02-28 NOTE — PLAN OF CARE
Isael has been afebrile, vitals stable.  Lung sounds clear.  Reports pain is well-controlled on scheduled morphine.  No complaints of nausea this shift.  Remains on tacro gtt unchanged.  Mom present in the evening and left overnight.  Will continue with POC and notify physician with concerns. Hourly rounding complete.

## 2021-02-28 NOTE — PLAN OF CARE
Afebrile. LSC on RA. OVSS. Voiding, one soft BM. Nausea, benadryl x1. Pain being controlled with morphine. Tacro gtt unchanged. Tylenol premed for platelets. An extra dose of 25 mg Benadryl given to cover time during platelet transfusion. Mother at bedside. Hourly rounding complete. Continue with POC.

## 2021-03-01 ENCOUNTER — APPOINTMENT (OUTPATIENT)
Dept: PHYSICAL THERAPY | Facility: CLINIC | Age: 21
DRG: 014 | End: 2021-03-01
Attending: PEDIATRICS
Payer: COMMERCIAL

## 2021-03-01 LAB
ALBUMIN SERPL-MCNC: 3.1 G/DL (ref 3.4–5)
ALP SERPL-CCNC: 74 U/L (ref 40–150)
ALT SERPL W P-5'-P-CCNC: 30 U/L (ref 0–70)
ANION GAP SERPL CALCULATED.3IONS-SCNC: 4 MMOL/L (ref 3–14)
APTT PPP: 42 SEC (ref 22–37)
AST SERPL W P-5'-P-CCNC: 16 U/L (ref 0–45)
BILIRUB DIRECT SERPL-MCNC: 0.6 MG/DL (ref 0–0.2)
BILIRUB SERPL-MCNC: 1 MG/DL (ref 0.2–1.3)
BLD PROD TYP BPU: NORMAL
BLD PROD TYP BPU: NORMAL
BLD UNIT ID BPU: 0
BLOOD PRODUCT CODE: NORMAL
BPU ID: NORMAL
BUN SERPL-MCNC: 22 MG/DL (ref 7–30)
CALCIUM SERPL-MCNC: 9.4 MG/DL (ref 8.5–10.1)
CHLORIDE SERPL-SCNC: 105 MMOL/L (ref 94–109)
CO2 SERPL-SCNC: 27 MMOL/L (ref 20–32)
COPATH REPORT: NORMAL
CREAT SERPL-MCNC: 0.64 MG/DL (ref 0.66–1.25)
DIFFERENTIAL METHOD BLD: ABNORMAL
ERYTHROCYTE [DISTWIDTH] IN BLOOD BY AUTOMATED COUNT: 10.4 % (ref 10–15)
FIBRINOGEN PPP-MCNC: 549 MG/DL (ref 200–420)
GFR SERPL CREATININE-BSD FRML MDRD: >90 ML/MIN/{1.73_M2}
GLUCOSE SERPL-MCNC: 143 MG/DL (ref 70–99)
HCT VFR BLD AUTO: 19.2 % (ref 40–53)
HGB BLD-MCNC: 7.1 G/DL (ref 13.3–17.7)
INR PPP: 1.11 (ref 0.86–1.14)
LDH SERPL L TO P-CCNC: 119 U/L (ref 85–227)
MAGNESIUM SERPL-MCNC: 1.6 MG/DL (ref 1.6–2.3)
MCH RBC QN AUTO: 30.9 PG (ref 26.5–33)
MCHC RBC AUTO-ENTMCNC: 37 G/DL (ref 31.5–36.5)
MCV RBC AUTO: 84 FL (ref 78–100)
NUM BPU REQUESTED: 1
PHOSPHATE SERPL-MCNC: 4 MG/DL (ref 2.5–4.5)
PLATELET # BLD AUTO: 16 10E9/L (ref 150–450)
POTASSIUM SERPL-SCNC: 4.9 MMOL/L (ref 3.4–5.3)
PREALB SERPL IA-MCNC: 24 MG/DL (ref 15–45)
PROT SERPL-MCNC: 6.2 G/DL (ref 6.8–8.8)
RBC # BLD AUTO: 2.3 10E12/L (ref 4.4–5.9)
SODIUM SERPL-SCNC: 136 MMOL/L (ref 133–144)
TACROLIMUS BLD-MCNC: 11.8 UG/L (ref 5–15)
TME LAST DOSE: NORMAL H
TRANSFUSION STATUS PATIENT QL: NORMAL
TRANSFUSION STATUS PATIENT QL: NORMAL
WBC # BLD AUTO: 0.1 10E9/L (ref 4–11)

## 2021-03-01 PROCEDURE — P9037 PLATE PHERES LEUKOREDU IRRAD: HCPCS | Performed by: NURSE PRACTITIONER

## 2021-03-01 PROCEDURE — 84100 ASSAY OF PHOSPHORUS: CPT | Performed by: NURSE PRACTITIONER

## 2021-03-01 PROCEDURE — 86922 COMPATIBILITY TEST ANTIGLOB: CPT | Performed by: PEDIATRICS

## 2021-03-01 PROCEDURE — 250N000011 HC RX IP 250 OP 636: Performed by: PEDIATRICS

## 2021-03-01 PROCEDURE — 206N000001 HC R&B BMT UMMC

## 2021-03-01 PROCEDURE — 85730 THROMBOPLASTIN TIME PARTIAL: CPT | Performed by: NURSE PRACTITIONER

## 2021-03-01 PROCEDURE — 258N000003 HC RX IP 258 OP 636: Performed by: PEDIATRICS

## 2021-03-01 PROCEDURE — 250N000011 HC RX IP 250 OP 636: Performed by: NURSE PRACTITIONER

## 2021-03-01 PROCEDURE — 250N000013 HC RX MED GY IP 250 OP 250 PS 637: Performed by: NURSE PRACTITIONER

## 2021-03-01 PROCEDURE — 80053 COMPREHEN METABOLIC PANEL: CPT | Performed by: NURSE PRACTITIONER

## 2021-03-01 PROCEDURE — 250N000009 HC RX 250: Performed by: NURSE PRACTITIONER

## 2021-03-01 PROCEDURE — C9113 INJ PANTOPRAZOLE SODIUM, VIA: HCPCS | Performed by: NURSE PRACTITIONER

## 2021-03-01 PROCEDURE — 86900 BLOOD TYPING SEROLOGIC ABO: CPT | Performed by: PEDIATRICS

## 2021-03-01 PROCEDURE — 85027 COMPLETE CBC AUTOMATED: CPT

## 2021-03-01 PROCEDURE — 250N000009 HC RX 250: Performed by: PEDIATRICS

## 2021-03-01 PROCEDURE — 97110 THERAPEUTIC EXERCISES: CPT | Mod: GP | Performed by: PHYSICAL THERAPIST

## 2021-03-01 PROCEDURE — 85610 PROTHROMBIN TIME: CPT | Performed by: NURSE PRACTITIONER

## 2021-03-01 PROCEDURE — 83735 ASSAY OF MAGNESIUM: CPT | Performed by: NURSE PRACTITIONER

## 2021-03-01 PROCEDURE — 99233 SBSQ HOSP IP/OBS HIGH 50: CPT | Performed by: PEDIATRICS

## 2021-03-01 PROCEDURE — 83615 LACTATE (LD) (LDH) ENZYME: CPT | Performed by: NURSE PRACTITIONER

## 2021-03-01 PROCEDURE — 84134 ASSAY OF PREALBUMIN: CPT | Performed by: NURSE PRACTITIONER

## 2021-03-01 PROCEDURE — 82248 BILIRUBIN DIRECT: CPT | Performed by: NURSE PRACTITIONER

## 2021-03-01 PROCEDURE — 86901 BLOOD TYPING SEROLOGIC RH(D): CPT | Performed by: PEDIATRICS

## 2021-03-01 PROCEDURE — 80197 ASSAY OF TACROLIMUS: CPT | Performed by: NURSE PRACTITIONER

## 2021-03-01 PROCEDURE — 86850 RBC ANTIBODY SCREEN: CPT | Performed by: PEDIATRICS

## 2021-03-01 PROCEDURE — 85384 FIBRINOGEN ACTIVITY: CPT | Performed by: NURSE PRACTITIONER

## 2021-03-01 RX ORDER — MORPHINE SULFATE 2 MG/ML
1.5 INJECTION, SOLUTION INTRAMUSCULAR; INTRAVENOUS EVERY 4 HOURS
Status: DISCONTINUED | OUTPATIENT
Start: 2021-03-01 | End: 2021-03-03

## 2021-03-01 RX ORDER — MORPHINE SULFATE 2 MG/ML
1 INJECTION, SOLUTION INTRAMUSCULAR; INTRAVENOUS
Status: DISCONTINUED | OUTPATIENT
Start: 2021-03-01 | End: 2021-03-02

## 2021-03-01 RX ADMIN — ACETAMINOPHEN 500 MG: 500 TABLET, FILM COATED ORAL at 02:08

## 2021-03-01 RX ADMIN — POTASSIUM CHLORIDE: 2 INJECTION, SOLUTION, CONCENTRATE INTRAVENOUS at 19:58

## 2021-03-01 RX ADMIN — MORPHINE SULFATE 1.5 MG: 2 INJECTION, SOLUTION INTRAMUSCULAR; INTRAVENOUS at 22:48

## 2021-03-01 RX ADMIN — MORPHINE SULFATE 2 MG: 2 INJECTION, SOLUTION INTRAMUSCULAR; INTRAVENOUS at 09:54

## 2021-03-01 RX ADMIN — MORPHINE SULFATE 2 MG: 2 INJECTION, SOLUTION INTRAMUSCULAR; INTRAVENOUS at 02:08

## 2021-03-01 RX ADMIN — MORPHINE SULFATE 2 MG: 2 INJECTION, SOLUTION INTRAMUSCULAR; INTRAVENOUS at 18:06

## 2021-03-01 RX ADMIN — MYCOPHENOLATE MOFETIL 960 MG: 500 INJECTION, POWDER, LYOPHILIZED, FOR SOLUTION INTRAVENOUS at 13:42

## 2021-03-01 RX ADMIN — MORPHINE SULFATE 2 MG: 2 INJECTION, SOLUTION INTRAMUSCULAR; INTRAVENOUS at 06:11

## 2021-03-01 RX ADMIN — DEXTROSE MONOHYDRATE 0.04 MG/KG/DAY: 5 INJECTION, SOLUTION INTRAVENOUS at 20:35

## 2021-03-01 RX ADMIN — PANTOPRAZOLE SODIUM 40 MG: 40 INJECTION, POWDER, FOR SOLUTION INTRAVENOUS at 07:57

## 2021-03-01 RX ADMIN — Medication 315 MCG: at 20:36

## 2021-03-01 RX ADMIN — URSODIOL 600 MG: 300 CAPSULE ORAL at 07:57

## 2021-03-01 RX ADMIN — LORAZEPAM 1 MG: 2 INJECTION INTRAMUSCULAR; INTRAVENOUS at 22:49

## 2021-03-01 RX ADMIN — URSODIOL 600 MG: 300 CAPSULE ORAL at 20:36

## 2021-03-01 RX ADMIN — LORAZEPAM 1 MG: 2 INJECTION INTRAMUSCULAR; INTRAVENOUS at 04:06

## 2021-03-01 RX ADMIN — ACYCLOVIR SODIUM 600 MG: 50 INJECTION, SOLUTION INTRAVENOUS at 15:53

## 2021-03-01 RX ADMIN — PANTOPRAZOLE SODIUM 40 MG: 40 INJECTION, POWDER, FOR SOLUTION INTRAVENOUS at 20:35

## 2021-03-01 RX ADMIN — CEFEPIME 2 G: 2 INJECTION, POWDER, FOR SOLUTION INTRAVENOUS at 12:04

## 2021-03-01 RX ADMIN — ACYCLOVIR SODIUM 600 MG: 50 INJECTION, SOLUTION INTRAVENOUS at 00:23

## 2021-03-01 RX ADMIN — LORAZEPAM 1 MG: 2 INJECTION INTRAMUSCULAR; INTRAVENOUS at 09:54

## 2021-03-01 RX ADMIN — NYSTATIN: 100000 OINTMENT TOPICAL at 07:57

## 2021-03-01 RX ADMIN — NYSTATIN: 100000 OINTMENT TOPICAL at 20:44

## 2021-03-01 RX ADMIN — MYCOPHENOLATE MOFETIL 960 MG: 500 INJECTION, POWDER, LYOPHILIZED, FOR SOLUTION INTRAVENOUS at 06:12

## 2021-03-01 RX ADMIN — MORPHINE SULFATE 2 MG: 2 INJECTION, SOLUTION INTRAMUSCULAR; INTRAVENOUS at 13:42

## 2021-03-01 RX ADMIN — DIPHENHYDRAMINE HYDROCHLORIDE 50 MG: 50 INJECTION, SOLUTION INTRAMUSCULAR; INTRAVENOUS at 02:08

## 2021-03-01 RX ADMIN — MYCOPHENOLATE MOFETIL 960 MG: 500 INJECTION, POWDER, LYOPHILIZED, FOR SOLUTION INTRAVENOUS at 22:49

## 2021-03-01 RX ADMIN — MICAFUNGIN SODIUM 50 MG: 50 INJECTION, POWDER, LYOPHILIZED, FOR SOLUTION INTRAVENOUS at 10:30

## 2021-03-01 RX ADMIN — URSODIOL 600 MG: 300 CAPSULE ORAL at 13:40

## 2021-03-01 RX ADMIN — CEFEPIME 2 G: 2 INJECTION, POWDER, FOR SOLUTION INTRAVENOUS at 04:06

## 2021-03-01 RX ADMIN — LORAZEPAM 1 MG: 2 INJECTION INTRAMUSCULAR; INTRAVENOUS at 15:53

## 2021-03-01 RX ADMIN — ACYCLOVIR SODIUM 600 MG: 50 INJECTION, SOLUTION INTRAVENOUS at 07:57

## 2021-03-01 RX ADMIN — CEFEPIME 2 G: 2 INJECTION, POWDER, FOR SOLUTION INTRAVENOUS at 20:35

## 2021-03-01 RX ADMIN — I.V. FAT EMULSION 240 ML: 20 EMULSION INTRAVENOUS at 19:58

## 2021-03-01 ASSESSMENT — MIFFLIN-ST. JEOR: SCORE: 1614.41

## 2021-03-01 NOTE — PLAN OF CARE
States pain and nausea controlled with scheduled morphine and ativan.  Up in room on computer this afternoon.  Hourly rounding done. POC followed.

## 2021-03-01 NOTE — PROGRESS NOTES
Integrative Health Progress Note    Artemio Nino is a 20 year old male with a diagnosis of ALL. Today he is day +6 from his second BMT and request massage to manage back, neck and shoulder pain.     Assessment    Pain Location: Back, neck and shoulder    Pre Session Pain: Moderate  Post Session Pain:  Mild    Pre Session Anxiety: None  Post Session Anxiety: None    Pre Session Nausea: None  Post Session Nausea: None    Post Session Observation: Calm/Relaxed    Intervention    Integrative Therapy(ies) Provided: Massage and Topical Essential Oil Application: Ache Ease Blend 2% concentration in coconut carrier oil    Plan for Follow up    Will continue to follow regularly.     Tita Canseco DNP, RN  Pager [phone]    Time Spent: 45 min

## 2021-03-01 NOTE — PLAN OF CARE
Isael has been afebrile.  BP 80s-100s/40s-60s.  Other vitals stable.  Lung sounds clear but diminished in the bases.  One emesis; PRN benadryl x 1.  Continues to report pain well controlled with scheduled morphine.  Did complain of right knee pain at the time of a scheduled dose.  No PRNs given.  Good UOP.  Received platelets with tylenol and benadryl pre-meds.  Tacro gtt remains unchanged.  Isael continues to defer a shower; last documented over a week ago.  Charge RN and MD notified.  Mom at bedside in evening and gone overnight.  Will continue with POC and notify physician with concerns. Hourly rounding complete.

## 2021-03-01 NOTE — PROGRESS NOTES
Pediatric BMT Daily Progress Note    Interval Events: Day +13 today. Remains afebrile, no acute interval events. BPs at baseline of 80s/40s during sleep. Remains comfortable on current morphine settings. No shower in 7 days despite encouragement from nursing staff. Out of bed and up to computer in afternoon.    Review of Systems: Pertinent positives include those mentioned in interval events. A complete review of systems was performed and is otherwise negative.      Medications:  Please see MAR    Physical Exam:    Temp:  [98.8  F (37.1  C)-99.1  F (37.3  C)] 98.9  F (37.2  C)  Pulse:  [] 96  Resp:  [16-20] 16  BP: ()/(40-70) 92/45  Cuff Mean (mmHg):  [72-85] 72  SpO2:  [99 %-100 %] 99 %     I/O last 3 completed shifts:  In: 3415 [I.V.:1282]  Out: 2662 [Urine:2462; Emesis/NG output:200]  GEN: Laying supine in bed, awakens with conversation and is pleasant, conversant, and cooperative with exam. NAD. Mother present and engaged at bedside.  HEENT: Head NC/AT, PERRL, EOMs intact, sclerae clear, nares patent, MMM. Mild erythema and ridging of lateral tongue and interior cheeks, no obvious lesions, slight interim improvement.   CARD: RRR, no murmur.  RESP: Lungs clear to auscultation bilaterally. Normal work of breathing.  ABD: Soft, NT, ND, NABS present, no organomegaly or masses noted.   EXTREM: WWP, MAEE  SKIN: Dry skin; mild maculopapular rash near CVL dressing on L chest with slightly interim improvement, absent elsewhere. Barbie-area not examined today.  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 Afebrile and clinically stable. Pain and nausea under good control with supportive medications.     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 S/p preparative regimen per [id number] with Thiotepa (days -7 trough -6), Fludarabine (days -5 through -3), Busulfan (days -5 through -3), ATG (days -4 through -2) followed by UCBT (2/16).   - Awaiting count recovery.  - Disease (BMBx) and donor evaluations due day +21     # Risk for GVHD:   - Continue Tacrolimus, goal 10-15 through day +14. Daily levels, pending from today.  - Continue MMF-- levels WNL    FEN/Renal:  # Risk for malnutrition: intake minimal  - Regular diet as tolerated  - Dietician to follow  - Continue TPN/IL-- cycle to 20h today    # Electrolyte abnormalities:    - Check daily electrolytes   - Replace/adjust in TPN as indicated     # Risk for renal dysfunction and fluid overload: GFR (1/28): 119 mls/min  - Monitor I/O's and daily weights     # Risk for aHUS/TA-TMA:    - Monitor LDH qMon/Thurs: 124 (2/25)  - Monitor urine protein/creatinine qTuesday: 0.23 (2/23)     Pulmonary:  # Risk for pulmonary insufficiency: No current concerns    Cardiovascular:  # Asymptomatic WPW syndrome (diagnosed 2018): no interventions to date. Most recent EKG (1/27). ECHO (1/26) normal, EF 64%. 24h Zio patch holter (1/27-1/28) revealed c/w WPW with no ventricular ectopies present. Cardiology following. BNP elevated 664 (2/16) likely attributed to fluid shifts per cardiology. BNP threshold of concern >5000.  - Obtain weekly BNP (improved, WNL at 20 2/23) and troponin (<0.015 2/23).  Next due 3/2.  - Follow up ECHO in 6 mos (7/2021)     # Risk for hypertension secondary to medications: normotensive currently, monitoring     Heme:   # Pancytopenia secondary to chemotherapy  - Transfuse for hemoglobin < 7, platelets parameter from 10 to 20k and check twice daily (see epistaxis below)  - Hx  of transfusion reactions (hives): Premedicate with tylenol and benadryl prior to pRBCs and plts  - GCSF daily until ANC >/= 2500 x 3 consecutive days    # Recent hx of epistaxis:   - Completed 3 day course of afrin (2/23-2/25) -- Continue PRN  - Maintain plt parameter at 20K  - Platelet check daily     Infectious Disease:  # Fever in context of BMT conditioning and central access (2/13):  - Of note, also has un-accessed port-a-cath.   - Continue empiric Cefepime through count recovery    # C.Difficile colitis: Diagnosed 2/17  - 10d course of oral vancomycin complete 2/26    # Risk for infection given immunocompromised status with need for prophylaxis:  - Viral (CMV/HSV sero positive): HD Acyclovir. Weekly CMV neg 2/23.  - Fungal: Micafungin. Transition to either itraconazole or posaconazole when tolerating PO.  - Bacterial: Cefepime as above  - PCP: Pentamidine given 2/15. Consider Bactrim next month if WBC criteria met (no hx of adverse reaction).  - Hypogammaglobinemia: IgG 433 2/26, continue checking q2 weeks and replace if <400 (no hx replacement at [facility])      Past infections: C. Diff (November 2020)     GI:   # Reflux:  - BID protonix   - PRN Tums     # Nausea: s/p 3d course of emend x2 (2/16-2/18 and 2/21-2/23)   - Ativan q6h  - Benadryl prn     # Risk for VOD/elastography study participant: most recent abd US (2/22) revealed stable, mild hepatomegaly  - Ursodiol TID   - Labs and imaging per study     Neuro:  # Mucositis/abdominal pain:    - Tylenol PRN  - Mugard QID PRN- home medication  - Morphine 2 mg q4H scheduled with 1 mg PRN q2H (not PCA as he does not want to wear pulse ox)  - Encourage warm packs     # History of significant spinal headaches: consider using Candelario needle with lumbar punctures    # CRS history. 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 No CRS, neurotoxicity or infectious complications from [place]  CAR-T. Continue Q 2 weeks IGG levels     Derm:  # Barbie-area rash: nystatin ointment BID (started )    Fertility: Sperm collected previously collected for fertility preservation     Discharge Considerations: Expected lengths of hospitalization for patients undergoing stem cell transplantation vary by primary diagnosis, conditioning regimen, graft source, and development of complications. A typical stay is 6 weeks.    The above plan of care was developed by and communicated to me by the Pediatric BMT attending physician, Amisha Senior MD.    SEBAS Christian (Flesher), PARichaC  Pediatric Blood and Marrow Transplant Program  [facility]  Pager: [phone]  Fax: [phone]    BMT Attending Note:    Artemio was seen and evaluated by me today.     The significant interval history includes: Afebrile. No new issues.      I have reviewed changes and data from the last 24 hours, including medications, laboratory results and vital signs.     I have formulated and discussed the plan with the BMT team. I discussed the course and plan with the patient/family and answered all of their questions to the best of my ability. I counseled them regarding the followin y/o M w/ h/o multiply relapsed Pre-B cell ALL (s/p MSD BMT, CART-19, CART-22) in CR3 here for consolidation of remission with MAC (FluBuTT ATG) conditioning, now S/P UCBT, awaiting count recovery, at risk for GVHD, at high risk for opportunistic infection, fever with ATG, C/ difficile colitis, at risk for malnutrition, nausea, mucositis, at risk for VOD, at risk for gastritis, WPW, recent epistaxis and anticipatory guidance re: expected count recovery and other potential transplant related complications. Will cycle TPN today. Adjust pain and nausea meds as clinically indicated.     My care coordination activities today include oversight of planned lab studies, oversight of medication changes and discussion with BMT  team-members.    My total floor time today was greater than 35 minutes, at least 50% of which was counseling and coordination of care.    Jaquelin Senior MD    Pediatric Blood and Marrow Transplant      Patient Active Problem List   Diagnosis     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)     Aaron-Parkinson-White (WPW) syndrome     Bone marrow transplant candidate     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)     At risk for infection     S/P allogeneic bone marrow transplant (H)     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     Examination of participant or control in clinical research

## 2021-03-01 NOTE — PROGRESS NOTES
Pt has been afebrile, AVS stable and within parameter. Lung sounds clear. Pt sleeping this am, awake, alert and playing Legos this afternoon. No complains of pain or nausea; scheduled medications helping. Encouraged oral cares and showering today with pt as he has not showered in quite some time. Adequate UOP. No stool.

## 2021-03-02 ENCOUNTER — APPOINTMENT (OUTPATIENT)
Dept: ULTRASOUND IMAGING | Facility: CLINIC | Age: 21
DRG: 014 | End: 2021-03-02
Attending: PEDIATRICS
Payer: COMMERCIAL

## 2021-03-02 LAB
ANION GAP SERPL CALCULATED.3IONS-SCNC: 5 MMOL/L (ref 3–14)
AT III ACT/NOR PPP CHRO: 99 % (ref 85–135)
BLD PROD TYP BPU: NORMAL
BLD UNIT ID BPU: 0
BLD UNIT ID BPU: 0
BLOOD PRODUCT CODE: NORMAL
BLOOD PRODUCT CODE: NORMAL
BPU ID: NORMAL
BPU ID: NORMAL
BUN SERPL-MCNC: 22 MG/DL (ref 7–30)
CALCIUM SERPL-MCNC: 9.2 MG/DL (ref 8.5–10.1)
CHLORIDE SERPL-SCNC: 104 MMOL/L (ref 94–109)
CO2 SERPL-SCNC: 28 MMOL/L (ref 20–32)
CREAT SERPL-MCNC: 0.58 MG/DL (ref 0.66–1.25)
CREAT UR-MCNC: 53 MG/DL
DIFFERENTIAL METHOD BLD: ABNORMAL
ERYTHROCYTE [DISTWIDTH] IN BLOOD BY AUTOMATED COUNT: 10.3 % (ref 10–15)
GFR SERPL CREATININE-BSD FRML MDRD: >90 ML/MIN/{1.73_M2}
GLUCOSE SERPL-MCNC: 130 MG/DL (ref 70–99)
HCT VFR BLD AUTO: 17 % (ref 40–53)
HGB BLD-MCNC: 6.2 G/DL (ref 13.3–17.7)
MCH RBC QN AUTO: 30.1 PG (ref 26.5–33)
MCHC RBC AUTO-ENTMCNC: 36.5 G/DL (ref 31.5–36.5)
MCV RBC AUTO: 83 FL (ref 78–100)
NT-PROBNP SERPL-MCNC: 79 PG/ML (ref 0–450)
NUM BPU REQUESTED: 1
PLATELET # BLD AUTO: 9 10E9/L (ref 150–450)
POTASSIUM SERPL-SCNC: 4.7 MMOL/L (ref 3.4–5.3)
PROT C ACT/NOR PPP CHRO: 58 % (ref 70–170)
PROT UR-MCNC: 0.16 G/L
PROT/CREAT 24H UR: 0.29 G/G CR (ref 0–0.2)
RBC # BLD AUTO: 2.06 10E12/L (ref 4.4–5.9)
SODIUM SERPL-SCNC: 137 MMOL/L (ref 133–144)
TACROLIMUS BLD-MCNC: 11.4 UG/L (ref 5–15)
TME LAST DOSE: NORMAL H
TRANSFUSION STATUS PATIENT QL: NORMAL
TROPONIN I SERPL-MCNC: 0.02 UG/L (ref 0–0.04)
WBC # BLD AUTO: 0.1 10E9/L (ref 4–11)

## 2021-03-02 PROCEDURE — 85300 ANTITHROMBIN III ACTIVITY: CPT | Performed by: NURSE PRACTITIONER

## 2021-03-02 PROCEDURE — 83880 ASSAY OF NATRIURETIC PEPTIDE: CPT | Performed by: NURSE PRACTITIONER

## 2021-03-02 PROCEDURE — 84156 ASSAY OF PROTEIN URINE: CPT | Performed by: PEDIATRICS

## 2021-03-02 PROCEDURE — 80197 ASSAY OF TACROLIMUS: CPT | Performed by: NURSE PRACTITIONER

## 2021-03-02 PROCEDURE — 250N000011 HC RX IP 250 OP 636: Performed by: PEDIATRICS

## 2021-03-02 PROCEDURE — 250N000013 HC RX MED GY IP 250 OP 250 PS 637: Performed by: NURSE PRACTITIONER

## 2021-03-02 PROCEDURE — 91200 LIVER ELASTOGRAPHY: CPT | Mod: TC

## 2021-03-02 PROCEDURE — 84484 ASSAY OF TROPONIN QUANT: CPT | Performed by: NURSE PRACTITIONER

## 2021-03-02 PROCEDURE — 258N000003 HC RX IP 258 OP 636: Performed by: PEDIATRICS

## 2021-03-02 PROCEDURE — C9113 INJ PANTOPRAZOLE SODIUM, VIA: HCPCS | Performed by: NURSE PRACTITIONER

## 2021-03-02 PROCEDURE — 206N000001 HC R&B BMT UMMC

## 2021-03-02 PROCEDURE — 93975 VASCULAR STUDY: CPT | Mod: 26 | Performed by: RADIOLOGY

## 2021-03-02 PROCEDURE — 250N000011 HC RX IP 250 OP 636: Performed by: NURSE PRACTITIONER

## 2021-03-02 PROCEDURE — P9040 RBC LEUKOREDUCED IRRADIATED: HCPCS | Performed by: PEDIATRICS

## 2021-03-02 PROCEDURE — 80048 BASIC METABOLIC PNL TOTAL CA: CPT | Performed by: NURSE PRACTITIONER

## 2021-03-02 PROCEDURE — P9037 PLATE PHERES LEUKOREDU IRRAD: HCPCS | Performed by: NURSE PRACTITIONER

## 2021-03-02 PROCEDURE — 250N000009 HC RX 250: Performed by: NURSE PRACTITIONER

## 2021-03-02 PROCEDURE — 85245 CLOT FACTOR VIII VW RISTOCTN: CPT | Performed by: PEDIATRICS

## 2021-03-02 PROCEDURE — 250N000009 HC RX 250: Performed by: PEDIATRICS

## 2021-03-02 PROCEDURE — 99233 SBSQ HOSP IP/OBS HIGH 50: CPT | Performed by: PEDIATRICS

## 2021-03-02 PROCEDURE — 85027 COMPLETE CBC AUTOMATED: CPT

## 2021-03-02 PROCEDURE — 85303 CLOT INHIBIT PROT C ACTIVITY: CPT | Performed by: PEDIATRICS

## 2021-03-02 PROCEDURE — 93975 VASCULAR STUDY: CPT

## 2021-03-02 RX ORDER — FUROSEMIDE 10 MG/ML
10 INJECTION INTRAMUSCULAR; INTRAVENOUS ONCE
Status: COMPLETED | OUTPATIENT
Start: 2021-03-03 | End: 2021-03-03

## 2021-03-02 RX ADMIN — MYCOPHENOLATE MOFETIL 960 MG: 500 INJECTION, POWDER, LYOPHILIZED, FOR SOLUTION INTRAVENOUS at 13:53

## 2021-03-02 RX ADMIN — DIPHENHYDRAMINE HYDROCHLORIDE 50 MG: 50 INJECTION, SOLUTION INTRAMUSCULAR; INTRAVENOUS at 21:37

## 2021-03-02 RX ADMIN — MYCOPHENOLATE MOFETIL 960 MG: 500 INJECTION, POWDER, LYOPHILIZED, FOR SOLUTION INTRAVENOUS at 06:40

## 2021-03-02 RX ADMIN — MORPHINE SULFATE 1.5 MG: 2 INJECTION, SOLUTION INTRAMUSCULAR; INTRAVENOUS at 09:40

## 2021-03-02 RX ADMIN — ACYCLOVIR SODIUM 600 MG: 50 INJECTION, SOLUTION INTRAVENOUS at 00:42

## 2021-03-02 RX ADMIN — URSODIOL 600 MG: 300 CAPSULE ORAL at 09:40

## 2021-03-02 RX ADMIN — ACYCLOVIR SODIUM 600 MG: 50 INJECTION, SOLUTION INTRAVENOUS at 10:42

## 2021-03-02 RX ADMIN — DEXTROSE MONOHYDRATE 0.04 MG/KG/DAY: 5 INJECTION, SOLUTION INTRAVENOUS at 21:40

## 2021-03-02 RX ADMIN — POTASSIUM CHLORIDE: 2 INJECTION, SOLUTION, CONCENTRATE INTRAVENOUS at 19:56

## 2021-03-02 RX ADMIN — Medication 315 MCG: at 20:06

## 2021-03-02 RX ADMIN — MORPHINE SULFATE 1.5 MG: 2 INJECTION, SOLUTION INTRAMUSCULAR; INTRAVENOUS at 23:08

## 2021-03-02 RX ADMIN — NYSTATIN: 100000 OINTMENT TOPICAL at 10:52

## 2021-03-02 RX ADMIN — ACETAMINOPHEN 500 MG: 500 TABLET, FILM COATED ORAL at 05:08

## 2021-03-02 RX ADMIN — CEFEPIME 2 G: 2 INJECTION, POWDER, FOR SOLUTION INTRAVENOUS at 20:42

## 2021-03-02 RX ADMIN — MORPHINE SULFATE 1.5 MG: 2 INJECTION, SOLUTION INTRAMUSCULAR; INTRAVENOUS at 18:16

## 2021-03-02 RX ADMIN — MICAFUNGIN SODIUM 50 MG: 50 INJECTION, POWDER, LYOPHILIZED, FOR SOLUTION INTRAVENOUS at 12:16

## 2021-03-02 RX ADMIN — CEFEPIME 2 G: 2 INJECTION, POWDER, FOR SOLUTION INTRAVENOUS at 04:05

## 2021-03-02 RX ADMIN — LORAZEPAM 1 MG: 2 INJECTION INTRAMUSCULAR; INTRAVENOUS at 16:43

## 2021-03-02 RX ADMIN — MORPHINE SULFATE 1.5 MG: 2 INJECTION, SOLUTION INTRAMUSCULAR; INTRAVENOUS at 13:52

## 2021-03-02 RX ADMIN — NYSTATIN: 100000 OINTMENT TOPICAL at 22:00

## 2021-03-02 RX ADMIN — URSODIOL 600 MG: 300 CAPSULE ORAL at 20:07

## 2021-03-02 RX ADMIN — CEFEPIME 2 G: 2 INJECTION, POWDER, FOR SOLUTION INTRAVENOUS at 13:23

## 2021-03-02 RX ADMIN — ACYCLOVIR SODIUM 600 MG: 50 INJECTION, SOLUTION INTRAVENOUS at 19:10

## 2021-03-02 RX ADMIN — PANTOPRAZOLE SODIUM 40 MG: 40 INJECTION, POWDER, FOR SOLUTION INTRAVENOUS at 10:28

## 2021-03-02 RX ADMIN — MORPHINE SULFATE 1.5 MG: 2 INJECTION, SOLUTION INTRAMUSCULAR; INTRAVENOUS at 05:49

## 2021-03-02 RX ADMIN — MYCOPHENOLATE MOFETIL 960 MG: 500 INJECTION, POWDER, LYOPHILIZED, FOR SOLUTION INTRAVENOUS at 21:42

## 2021-03-02 RX ADMIN — LORAZEPAM 1 MG: 2 INJECTION INTRAMUSCULAR; INTRAVENOUS at 04:01

## 2021-03-02 RX ADMIN — I.V. FAT EMULSION 240 ML: 20 EMULSION INTRAVENOUS at 19:56

## 2021-03-02 RX ADMIN — LORAZEPAM 1 MG: 2 INJECTION INTRAMUSCULAR; INTRAVENOUS at 23:08

## 2021-03-02 RX ADMIN — LORAZEPAM 1 MG: 2 INJECTION INTRAMUSCULAR; INTRAVENOUS at 10:42

## 2021-03-02 RX ADMIN — DIPHENHYDRAMINE HYDROCHLORIDE 50 MG: 50 INJECTION, SOLUTION INTRAMUSCULAR; INTRAVENOUS at 05:08

## 2021-03-02 RX ADMIN — PANTOPRAZOLE SODIUM 40 MG: 40 INJECTION, POWDER, FOR SOLUTION INTRAVENOUS at 20:06

## 2021-03-02 RX ADMIN — MORPHINE SULFATE 1.5 MG: 2 INJECTION, SOLUTION INTRAMUSCULAR; INTRAVENOUS at 02:45

## 2021-03-02 ASSESSMENT — MIFFLIN-ST. JEOR: SCORE: 1628.37

## 2021-03-02 NOTE — PLAN OF CARE
Isael afebrile, VS stable. LSC. C/O posterior neck pain, declined intervention. Emesis x1, otherwise denied nasuea. No stool this shift. Good UOP. Slept well in between cares. Received and tolerated platelets, received tylenol and benadryl premeds. Will need RBCs on next shift. Hourly rounding completed, continue with POC.

## 2021-03-02 NOTE — PLAN OF CARE
T max 98.9 Other VSS. Denies pain. RBCs infused without issue. Mouth care set up for patient at bedside with 0800 cares. Integrative Therapy visit today for a massage. Adequate UOP. No stool. Emesis x 1. Continue with plan of care.

## 2021-03-02 NOTE — PROGRESS NOTES
Integrative Health Progress Note    Artemio Nino is a 20 year old male with a diagnosis of ALL. Today he is day +14 from his second BMT and request massage to manage leg pain.     Assessment    Pain Location: Bilateral Legs    Pre Session Pain: Moderate  Post Session Pain:  Mild    Pre Session Anxiety: None  Post Session Anxiety: None    Pre Session Nausea: None  Post Session Nausea: None    Post Session Observation: Calm/Relaxed    Intervention    Integrative Therapy(ies) Provided: Massage and Topical Essential Oil Application: Ache Ease Blend 2% concentration in coconut carrier oil    Plan for Follow up    Will continue to follow regularly.     Tita Canseco DNP, RN  Pager [phone]    Time Spent: 35 min

## 2021-03-02 NOTE — PROGRESS NOTES
Pediatric BMT Daily Progress Note    Interval Events: Artemio remains afebrile. Feels his mucositis-related throat discomfort is improving, morphine dosing weaned last evening per his request. Emesis x1 last evening. Showered yesterday.    Review of Systems: Pertinent positives include those mentioned in interval events. A complete review of systems was performed and is otherwise negative.      Medications:  Please see MAR    Physical Exam:    Temp:  [98.9  F (37.2  C)-99.4  F (37.4  C)] 98.9  F (37.2  C)  Pulse:  [] 88  Resp:  [16-18] 16  BP: ()/(42-69) 93/48  Cuff Mean (mmHg):  [72] 72  SpO2:  [98 %-100 %] 99 %     I/O last 3 completed shifts:  In: 3537 [I.V.:1102]  Out: 2885 [Urine:2765; Emesis/NG output:120]  GEN: Sleeping in lateral position in bed. Awakens with conversation, pleasant and cooperative. NAD. Mother present at bedside.  HEENT: Head NC/AT, PERRL, EOMs intact, sclerae clear, nares patent, MMM. Oral exam deferred today, yesterday with noted slightly interval improvement in the mild erythema and ridging of lateral tongue and interior cheeks.  CARD: RRR, no murmur.  RESP: Lungs clear to auscultation bilaterally. Normal work of breathing.  ABD: Soft, NT, ND, NABS present, no organomegaly or masses noted.   EXTREM: WWP, MAEE  SKIN: Dry skin; mild maculopapular rash near CVL dressing on L chest with slightly interim improvement, absent elsewhere. Barbie-area not examined today.  NEURO: No focal deficits  ACCESS: Double lumen burns, port (not accessed)    Labs: All reviewed with pertinent positives: Hgb 6.2, Plts 9k; BUN 22, Creat 0.58    Assessment and Plan: Artemio is a 19 yo male with history of multiply relapsed ALL now day +14 s/p UCBT following MAC per 2013-09. Afebrile and clinically stable. Pain and nausea under good control with supportive medications.     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 S/p preparative regimen per [id number] with Thiotepa (days -7 trough -6), Fludarabine (days -5 through -3), Busulfan (days -5 through -3), ATG (days -4 through -2) followed by UCBT (2/16).   - Awaiting count recovery, WBC 0.1 today  - Disease (BMBx) and donor evaluations due day +21     # Risk for GVHD:   - Continue Tacrolimus, goal 10-15 through day +14. Daily levels, pending from today.  - Continue MMF-- levels WNL    FEN/Renal:  # Risk for malnutrition: intake minimal  - Regular diet as tolerated  - Dietician to follow  - Continue TPN/IL-- cycle to 16h today    # Electrolyte abnormalities:    - Check daily electrolytes   - Replace/adjust in TPN as indicated     # Risk for renal dysfunction and fluid overload: GFR (1/28): 119 mls/min  - Monitor I/O's and daily weights     # Risk for aHUS/TA-TMA:    - Monitor LDH qMon/Thurs: 124 (2/25)  - Monitor urine protein/creatinine qTuesday: 0.23 (2/23)     Pulmonary:  # Risk for pulmonary insufficiency: No current concerns    Cardiovascular:  # Asymptomatic WPW syndrome (diagnosed 2018): no interventions to date. Most recent EKG (1/27). ECHO (1/26) normal, EF 64%. 24h Zio patch holter (1/27-1/28) revealed c/w WPW with no ventricular ectopies present. Cardiology following. BNP elevated 664 (2/16) likely attributed to fluid shifts per cardiology. BNP threshold of concern >5000.  - Obtain weekly (Tu) BNP (WNL at 79 on 3/2) and troponin (WNK at 0.018 on 3/2)  - Follow up ECHO in 6 mos (7/2021)     # Risk for hypertension secondary to medications: normotensive currently, monitoring     Heme:   # Pancytopenia secondary to chemotherapy  - Transfuse for hemoglobin < 7, platelets parameter 20k (epistaxis)-- change hgb parameter to <8 g/dL due to concern for mild dizziness, pallor, and malaise with hgb <7  - Hx  of transfusion reactions (hives): Premedicate with tylenol and benadryl prior to pRBCs and plts  - GCSF daily until ANC >/= 2500 x 3 consecutive days    # Recent hx of epistaxis:   - Completed 3 day course of afrin (2/23-2/25) -- Continue PRN  - Maintain plt parameter at 20K  - Platelet check daily     Infectious Disease:  # Fever in context of BMT conditioning and central access (2/13):  - Of note, also has un-accessed port-a-cath.   - Continue empiric Cefepime through count recovery    # C.Difficile colitis: Diagnosed 2/17  - 10d course of oral vancomycin complete 2/26    # Risk for infection given immunocompromised status with need for prophylaxis:  - Viral (CMV/HSV sero positive): HD Acyclovir. Weekly CMV neg 2/23, pending 3/2.  - Fungal: Micafungin. Transition to either itraconazole or posaconazole when tolerating PO.  - Bacterial: Cefepime as above  - PCP: Pentamidine given 2/15. Consider Bactrim next month if WBC criteria met (no hx of adverse reaction).  - Hypogammaglobinemia: IgG 433 2/26, continue checking q2 weeks and replace if <400 (no hx replacement at [facility])      Past infections: C. Diff (November 2020)     GI:   # Reflux:  - BID protonix   - PRN Tums     # Nausea: s/p 3d course of emend x2 (2/16-2/18 and 2/21-2/23)   - Ativan q6h  - Benadryl prn     # Risk for VOD/elastography study participant: most recent abd US (2/22) revealed stable, mild hepatomegaly  - Ursodiol TID   - Labs and imaging per study-- due today 3/2     Neuro:  # Mucositis/abdominal pain:    - Tylenol PRN  - Mugard QID PRN- home medication  - Morphine 1.5 mg q4H scheduled with 1 mg PRN q2H (not PCA as he does not want to wear pulse ox)-- consider further wean tomorrow  - Encourage warm packs     # History of significant spinal headaches: consider using Candelario needle with lumbar punctures    # CRS history. 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  No CRS, neurotoxicity or infectious complications from [place] CAR-T. Continue Q 2 weeks IGG levels     Derm:  # Barbie-area rash: nystatin ointment BID (started )    Fertility: Sperm collected previously collected for fertility preservation     Discharge Considerations: Expected lengths of hospitalization for patients undergoing stem cell transplantation vary by primary diagnosis, conditioning regimen, graft source, and development of complications. A typical stay is 6 weeks.    The above plan of care was developed by and communicated to me by the Pediatric BMT attending physician, Amisha Senior MD.    SEBAS Christian (Flesher), PA-C  Pediatric Blood and Marrow Transplant Program  [facility]  Pager: [phone]  Fax: [phone]    BMT Attending Note:    Artemio was seen and evaluated by me today.     The significant interval history includes: Mom feels he's very fatigued and dizzy when his hemoglobin gets in the 7 range. Decreased morphine dose per Isael's request last night.       I have reviewed changes and data from the last 24 hours, including medications, laboratory results and vital signs.     I have formulated and discussed the plan with the BMT team. I discussed the course and plan with the patient/family and answered all of their questions to the best of my ability. I counseled them regarding the followin y/o M w/ h/o multiply relapsed Pre-B cell ALL (s/p MSD BMT, CART-19, CART-22) in CR3 here for consolidation of remission with MAC (FluBuTT ATG) conditioning, now S/P UCBT, awaiting count recovery, at risk for GVHD, at high risk for opportunistic infection, fever with ATG, C/ difficile colitis, at risk for malnutrition, nausea, mucositis, at risk for VOD, at risk for gastritis, WPW, recent epistaxis and anticipatory guidance re: expected count recovery and other potential transplant related complications. Will cycle TPN again today. Will increase  hemoglobin transfusion parameter to <8 given dizziness and fatigue.     My care coordination activities today include oversight of planned lab studies, oversight of medication changes and discussion with BMT team-members.    My total floor time today was greater than 35 minutes, at least 50% of which was counseling and coordination of care.    Jaquelin Senior MD    Pediatric Blood and Marrow Transplant      Patient Active Problem List   Diagnosis     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)     Aaron-Parkinson-White (WPW) syndrome     Bone marrow transplant candidate     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)     At risk for infection     S/P allogeneic bone marrow transplant (H)     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     Examination of participant or control in clinical research

## 2021-03-03 LAB
ABO + RH BLD: NORMAL
ABO + RH BLD: NORMAL
ANION GAP SERPL CALCULATED.3IONS-SCNC: 8 MMOL/L (ref 3–14)
BLD GP AB SCN SERPL QL: NORMAL
BLD PROD TYP BPU: NORMAL
BLD UNIT ID BPU: 0
BLD UNIT ID BPU: 0
BLOOD BANK CMNT PATIENT-IMP: NORMAL
BLOOD PRODUCT CODE: NORMAL
BLOOD PRODUCT CODE: NORMAL
BPU ID: NORMAL
BPU ID: NORMAL
BUN SERPL-MCNC: 24 MG/DL (ref 7–30)
CALCIUM SERPL-MCNC: 9 MG/DL (ref 8.5–10.1)
CHLORIDE SERPL-SCNC: 104 MMOL/L (ref 94–109)
CMV DNA SPEC NAA+PROBE-ACNC: NORMAL [IU]/ML
CMV DNA SPEC NAA+PROBE-LOG#: NORMAL {LOG_IU}/ML
CO2 SERPL-SCNC: 26 MMOL/L (ref 20–32)
CREAT SERPL-MCNC: 0.62 MG/DL (ref 0.66–1.25)
DIFFERENTIAL METHOD BLD: ABNORMAL
ERYTHROCYTE [DISTWIDTH] IN BLOOD BY AUTOMATED COUNT: 10.7 % (ref 10–15)
GFR SERPL CREATININE-BSD FRML MDRD: >90 ML/MIN/{1.73_M2}
GLUCOSE SERPL-MCNC: 128 MG/DL (ref 70–99)
HCT VFR BLD AUTO: 19.6 % (ref 40–53)
HGB BLD-MCNC: 7 G/DL (ref 13.3–17.7)
MAGNESIUM SERPL-MCNC: 1.9 MG/DL (ref 1.6–2.3)
MCH RBC QN AUTO: 29.7 PG (ref 26.5–33)
MCHC RBC AUTO-ENTMCNC: 35.7 G/DL (ref 31.5–36.5)
MCV RBC AUTO: 83 FL (ref 78–100)
NUM BPU REQUESTED: 1
NUM BPU REQUESTED: 2
PHOSPHATE SERPL-MCNC: 4.5 MG/DL (ref 2.5–4.5)
PLATELET # BLD AUTO: 15 10E9/L (ref 150–450)
POTASSIUM SERPL-SCNC: 4.4 MMOL/L (ref 3.4–5.3)
RBC # BLD AUTO: 2.36 10E12/L (ref 4.4–5.9)
SODIUM SERPL-SCNC: 138 MMOL/L (ref 133–144)
SPECIMEN EXP DATE BLD: NORMAL
SPECIMEN SOURCE: NORMAL
TACROLIMUS BLD-MCNC: 12.3 UG/L (ref 5–15)
TME LAST DOSE: NORMAL H
TRANSFUSION STATUS PATIENT QL: NORMAL
VWF:AC ACT/NOR PPP IA: >390 % (ref 50–180)
WBC # BLD AUTO: 0.1 10E9/L (ref 4–11)

## 2021-03-03 PROCEDURE — 83735 ASSAY OF MAGNESIUM: CPT | Performed by: NURSE PRACTITIONER

## 2021-03-03 PROCEDURE — 258N000003 HC RX IP 258 OP 636: Performed by: PEDIATRICS

## 2021-03-03 PROCEDURE — 206N000001 HC R&B BMT UMMC

## 2021-03-03 PROCEDURE — 84100 ASSAY OF PHOSPHORUS: CPT | Performed by: NURSE PRACTITIONER

## 2021-03-03 PROCEDURE — 85049 AUTOMATED PLATELET COUNT: CPT | Performed by: NURSE PRACTITIONER

## 2021-03-03 PROCEDURE — 80197 ASSAY OF TACROLIMUS: CPT | Performed by: NURSE PRACTITIONER

## 2021-03-03 PROCEDURE — 80048 BASIC METABOLIC PNL TOTAL CA: CPT | Performed by: NURSE PRACTITIONER

## 2021-03-03 PROCEDURE — P9040 RBC LEUKOREDUCED IRRADIATED: HCPCS | Performed by: PEDIATRICS

## 2021-03-03 PROCEDURE — 250N000009 HC RX 250: Performed by: NURSE PRACTITIONER

## 2021-03-03 PROCEDURE — 250N000011 HC RX IP 250 OP 636: Performed by: STUDENT IN AN ORGANIZED HEALTH CARE EDUCATION/TRAINING PROGRAM

## 2021-03-03 PROCEDURE — P9037 PLATE PHERES LEUKOREDU IRRAD: HCPCS | Performed by: NURSE PRACTITIONER

## 2021-03-03 PROCEDURE — 250N000009 HC RX 250: Performed by: PEDIATRICS

## 2021-03-03 PROCEDURE — 99233 SBSQ HOSP IP/OBS HIGH 50: CPT | Performed by: PEDIATRICS

## 2021-03-03 PROCEDURE — 250N000011 HC RX IP 250 OP 636: Performed by: PEDIATRICS

## 2021-03-03 PROCEDURE — 250N000011 HC RX IP 250 OP 636: Performed by: NURSE PRACTITIONER

## 2021-03-03 PROCEDURE — C9113 INJ PANTOPRAZOLE SODIUM, VIA: HCPCS | Performed by: NURSE PRACTITIONER

## 2021-03-03 PROCEDURE — 85027 COMPLETE CBC AUTOMATED: CPT

## 2021-03-03 PROCEDURE — 250N000013 HC RX MED GY IP 250 OP 250 PS 637: Performed by: NURSE PRACTITIONER

## 2021-03-03 RX ORDER — FUROSEMIDE 10 MG/ML
10 INJECTION INTRAMUSCULAR; INTRAVENOUS ONCE
Status: COMPLETED | OUTPATIENT
Start: 2021-03-04 | End: 2021-03-04

## 2021-03-03 RX ORDER — MORPHINE SULFATE 2 MG/ML
1.2 INJECTION, SOLUTION INTRAMUSCULAR; INTRAVENOUS EVERY 4 HOURS
Status: DISCONTINUED | OUTPATIENT
Start: 2021-03-03 | End: 2021-03-06

## 2021-03-03 RX ADMIN — ACETAMINOPHEN 500 MG: 500 TABLET, FILM COATED ORAL at 11:03

## 2021-03-03 RX ADMIN — ACYCLOVIR SODIUM 600 MG: 50 INJECTION, SOLUTION INTRAVENOUS at 03:24

## 2021-03-03 RX ADMIN — MORPHINE SULFATE 1.5 MG: 2 INJECTION, SOLUTION INTRAMUSCULAR; INTRAVENOUS at 03:24

## 2021-03-03 RX ADMIN — URSODIOL 600 MG: 300 CAPSULE ORAL at 19:20

## 2021-03-03 RX ADMIN — MORPHINE SULFATE 1.2 MG: 2 INJECTION, SOLUTION INTRAMUSCULAR; INTRAVENOUS at 22:10

## 2021-03-03 RX ADMIN — CEFEPIME 2 G: 2 INJECTION, POWDER, FOR SOLUTION INTRAVENOUS at 04:20

## 2021-03-03 RX ADMIN — DIPHENHYDRAMINE HYDROCHLORIDE 50 MG: 50 INJECTION, SOLUTION INTRAMUSCULAR; INTRAVENOUS at 04:20

## 2021-03-03 RX ADMIN — LORAZEPAM 1 MG: 2 INJECTION INTRAMUSCULAR; INTRAVENOUS at 16:25

## 2021-03-03 RX ADMIN — LORAZEPAM 1 MG: 2 INJECTION INTRAMUSCULAR; INTRAVENOUS at 10:04

## 2021-03-03 RX ADMIN — MORPHINE SULFATE 1.2 MG: 2 INJECTION, SOLUTION INTRAMUSCULAR; INTRAVENOUS at 17:33

## 2021-03-03 RX ADMIN — DEXTROSE MONOHYDRATE 0.03 MG/KG/DAY: 5 INJECTION, SOLUTION INTRAVENOUS at 20:02

## 2021-03-03 RX ADMIN — LORAZEPAM 1 MG: 2 INJECTION INTRAMUSCULAR; INTRAVENOUS at 05:22

## 2021-03-03 RX ADMIN — ACYCLOVIR SODIUM 600 MG: 50 INJECTION, SOLUTION INTRAVENOUS at 18:43

## 2021-03-03 RX ADMIN — PANTOPRAZOLE SODIUM 40 MG: 40 INJECTION, POWDER, FOR SOLUTION INTRAVENOUS at 20:02

## 2021-03-03 RX ADMIN — SODIUM CHLORIDE: 234 INJECTION INTRAMUSCULAR; INTRAVENOUS; SUBCUTANEOUS at 20:08

## 2021-03-03 RX ADMIN — LORAZEPAM 1 MG: 2 INJECTION INTRAMUSCULAR; INTRAVENOUS at 22:16

## 2021-03-03 RX ADMIN — MYCOPHENOLATE MOFETIL 960 MG: 500 INJECTION, POWDER, LYOPHILIZED, FOR SOLUTION INTRAVENOUS at 22:13

## 2021-03-03 RX ADMIN — CEFEPIME 2 G: 2 INJECTION, POWDER, FOR SOLUTION INTRAVENOUS at 13:56

## 2021-03-03 RX ADMIN — MORPHINE SULFATE 1.5 MG: 2 INJECTION, SOLUTION INTRAMUSCULAR; INTRAVENOUS at 10:05

## 2021-03-03 RX ADMIN — DIPHENHYDRAMINE HYDROCHLORIDE 50 MG: 50 INJECTION, SOLUTION INTRAMUSCULAR; INTRAVENOUS at 11:03

## 2021-03-03 RX ADMIN — PANTOPRAZOLE SODIUM 40 MG: 40 INJECTION, POWDER, FOR SOLUTION INTRAVENOUS at 08:26

## 2021-03-03 RX ADMIN — URSODIOL 600 MG: 300 CAPSULE ORAL at 13:55

## 2021-03-03 RX ADMIN — NYSTATIN: 100000 OINTMENT TOPICAL at 08:28

## 2021-03-03 RX ADMIN — ACYCLOVIR SODIUM 600 MG: 50 INJECTION, SOLUTION INTRAVENOUS at 10:04

## 2021-03-03 RX ADMIN — MORPHINE SULFATE 1.2 MG: 2 INJECTION, SOLUTION INTRAMUSCULAR; INTRAVENOUS at 13:55

## 2021-03-03 RX ADMIN — MORPHINE SULFATE 1.5 MG: 2 INJECTION, SOLUTION INTRAMUSCULAR; INTRAVENOUS at 05:33

## 2021-03-03 RX ADMIN — I.V. FAT EMULSION 240 ML: 20 EMULSION INTRAVENOUS at 20:08

## 2021-03-03 RX ADMIN — Medication 315 MCG: at 20:04

## 2021-03-03 RX ADMIN — MYCOPHENOLATE MOFETIL 960 MG: 500 INJECTION, POWDER, LYOPHILIZED, FOR SOLUTION INTRAVENOUS at 06:22

## 2021-03-03 RX ADMIN — ACETAMINOPHEN 500 MG: 500 TABLET, FILM COATED ORAL at 04:20

## 2021-03-03 RX ADMIN — MICAFUNGIN SODIUM 50 MG: 50 INJECTION, POWDER, LYOPHILIZED, FOR SOLUTION INTRAVENOUS at 09:02

## 2021-03-03 RX ADMIN — URSODIOL 600 MG: 300 CAPSULE ORAL at 08:26

## 2021-03-03 RX ADMIN — MYCOPHENOLATE MOFETIL 960 MG: 500 INJECTION, POWDER, LYOPHILIZED, FOR SOLUTION INTRAVENOUS at 14:20

## 2021-03-03 RX ADMIN — NYSTATIN: 100000 OINTMENT TOPICAL at 21:02

## 2021-03-03 RX ADMIN — CEFEPIME 2 G: 2 INJECTION, POWDER, FOR SOLUTION INTRAVENOUS at 21:10

## 2021-03-03 RX ADMIN — FUROSEMIDE 10 MG: 10 INJECTION, SOLUTION INTRAMUSCULAR; INTRAVENOUS at 08:26

## 2021-03-03 ASSESSMENT — MIFFLIN-ST. JEOR: SCORE: 1624.37

## 2021-03-03 NOTE — PLAN OF CARE
Patient had a temp max of 99.5 orally.  Other vitals stable.  Blood pressure softer this morning 89/42 but that was after Benadryl, Ativan and Morphine all were given at different time intervals to prevent sedation.  He was easily woken and able to follow directions while doing vitals.  Good urine output.  Had one emesis on evening shift and requested Benadryl. No complaints of pain and continues to get Morphine every 4 hours.  Platelets given with no issues. Hourly rounding completed.

## 2021-03-03 NOTE — PROGRESS NOTES
Pediatric BMT Daily Progress Note    Interval Events: Artemio remains afebrile. Tolerating his morphine taper without symptoms of withdrawal, desires to continue his morphine wean. Requires transfusion for hemoglobin of 7 and platelets of 15, no further epistaxis in last 24 hours. Complains of lower back pain, reports back pain is worse when sitting up and ambulating, pain is non radiating, no use of prn morphine for back pain.     Review of Systems: Pertinent positives include those mentioned in interval events. A complete review of systems was performed and is otherwise negative.      Medications:  Please see MAR    Physical Exam:    Temp:  [98.7  F (37.1  C)-99.5  F (37.5  C)] 98.7  F (37.1  C)  Pulse:  [] 96  Resp:  [16-18] 18  BP: ()/(42-67) 94/47  Cuff Mean (mmHg):  [70-74] 70  SpO2:  [98 %-100 %] 98 %     I/O last 3 completed shifts:  In: 4341.23 [I.V.:1142.23]  Out: 2850 [Urine:2750; Emesis/NG output:100]  GEN: Lying lateral position in bed, sleepy from benadryl but interactive. NAD. Mother present at bedside.  HEENT: Head NC/AT, PERRL, EOMs intact, sclerae clear, nares patent, MMM.   CARD: RRR, no murmur.  RESP: Lungs clear to auscultation bilaterally. Normal work of breathing.  ABD: Soft, NT, ND, NABS present, no organomegaly or masses noted.   EXTREM: WWP, MAEE  SKIN: Dry skin; mild maculopapular rash near CVL dressing on L chest with slightly interim improvement, absent elsewhere. Barbie-area not examined today.  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 Afebrile and clinically stable. Pain and nausea under good control with supportive medications, now weaning morphine as tolerated. Will cycle TPN to 12 hours tonight. Monitoring lower back  pain, encouraged getting out of bed.     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 S/p preparative regimen per [id number] with Thiotepa (days -7 trough -6), Fludarabine (days -5 through -3), Busulfan (days -5 through -3), ATG (days -4 through -2) followed by UCBT (2/16).   - Awaiting count recovery, WBC 0.1 today  - Disease (BMBx) and donor evaluations due day +21     # Risk for GVHD:   - Continue Tacrolimus, goal 10-15 through day +14. Daily levels, pending from today  - Tacrolimus level  goal now 5 to 10   - Continue MMF-- levels WNL    FEN/Renal:  # Risk for malnutrition: intake minimal  - Regular diet as tolerated  - Dietician to follow  - Continue TPN/IL-- cycle to 12 hours tonight     # Electrolyte abnormalities:    - Check daily electrolytes   - Replace/adjust in TPN as indicated     # Risk for renal dysfunction and fluid overload: GFR (1/28): 119 mls/min  - Monitor I/O's and daily weights  - Lasix times one this am consistently net positive ~1liter in last 24 hours     # Risk for aHUS/TA-TMA:    - Monitor LDH qMon/Thurs: 119 (3/1)  - Monitor urine protein/creatinine qTuesday: 0.29 (3/2 )     Pulmonary:  # Risk for pulmonary insufficiency: No current concerns    Cardiovascular:  # Asymptomatic WPW syndrome (diagnosed 2018): no interventions to date. Most recent EKG (1/27). ECHO (1/26) normal, EF 64%. 24h Zio patch holter (1/27-1/28) revealed c/w WPW with no ventricular ectopies present. Cardiology following. BNP elevated 664 (2/16) likely attributed to fluid shifts per cardiology. BNP threshold of concern >5000.  - Obtain weekly (Tu) BNP (WNL at 79 on 3/2) and troponin (WNK at 0.018 on 3/2)  - Follow up ECHO in 6 mos (7/2021)     # Risk for hypertension  secondary to medications: normotensive currently, monitoring     Heme:   # Pancytopenia secondary to chemotherapy  - Transfuse for hemoglobin < 8, platelets parameter 20k (epistaxis) (hgb parameter to <8 g/dL due to concern for mild dizziness, pallor, and malaise with hgb <7)  - Hx of transfusion reactions (hives): Premedicate with tylenol and benadryl prior to pRBCs and plts  - GCSF daily until ANC >/= 2500 x 3 consecutive days    # Recent hx of epistaxis:   - Completed 3 day course of afrin (2/23-2/25) -- Continue PRN  - Maintain plt parameter at 20K  - Platelet check daily     Infectious Disease:  # Fever in context of BMT conditioning and central access (2/13):  - Of note, also has un-accessed port-a-cath.   - Continue empiric Cefepime through count recovery    # C.Difficile colitis: Diagnosed 2/17  - 10d course of oral vancomycin complete 2/26    # Risk for infection given immunocompromised status with need for prophylaxis:  - Viral (CMV/HSV sero positive): HD Acyclovir. Weekly CMV neg 2/24, pending 3/2.  - Fungal: Micafungin. Transition to either itraconazole or posaconazole when tolerating PO.  - Bacterial: Cefepime as above  - PCP: Pentamidine given 2/15. Consider Bactrim next month if WBC criteria met (no hx of adverse reaction).  - Hypogammaglobinemia: IgG 433 2/26, continue checking q2 weeks and replace if <400 (no hx replacement at [facility])      Past infections: C. Diff (November 2020)     GI:   # Reflux:  - BID protonix   - PRN Tums     # Nausea: s/p 3d course of emend x2 (2/16-2/18 and 2/21-2/23)   - Ativan q6h  - Benadryl prn     # Risk for VOD/elastography study participant: most recent abd US (2/22) revealed stable, mild hepatomegaly  - Ursodiol TID   - Labs and imaging per study-- stable hepatomegaly      Neuro:  # Mucositis/abdominal pain:    - Tylenol PRN  - Mugard QID PRN- home medication  - Wean Morphine to 1.25 mg q4H scheduled with 1 mg PRN q2H (not PCA as he does not want to wear pulse ox)  -  Encourage warm packs     # History of significant spinal headaches: consider using Candelario needle with lumbar punctures    # CRS history. 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 No CRS, neurotoxicity or infectious complications from [place] CAR-T. Continue Q 2 weeks IGG levels     Derm:  # Barbie-area rash: nystatin ointment BID (started )    Fertility: Sperm collected previously collected for fertility preservation     Discharge Considerations: Expected lengths of hospitalization for patients undergoing stem cell transplantation vary by primary diagnosis, conditioning regimen, graft source, and development of complications. A typical stay is 6 weeks.    The above plan of care was developed by and communicated to me by the Pediatric BMT attending physician, Amisha Senior MD.    Lilia Prabhakar MSN, CPNP-  Pediatric Blood and Marrow Transplant Program  [facility] [phone]  Pager [phone]    BMT Attending Note:    Artemio was seen and evaluated by me today.     The significant interval history includes: Some lower back pain yesterday. Otherwise no new issues.       I have reviewed changes and data from the last 24 hours, including medications, laboratory results and vital signs.     I have formulated and discussed the plan with the BMT team. I discussed the course and plan with the patient/family and answered all of their questions to the best of my ability. I counseled them regarding the followin y/o M w/ h/o multiply relapsed Pre-B cell ALL (s/p MSD BMT, CART-19, CART-22) in CR3 here for consolidation of remission with MAC (FluBuTT ATG) conditioning, now S/P UCBT, awaiting count recovery, at risk for GVHD, at high risk for opportunistic infection, fever with ATG, C/ difficile colitis, at risk for malnutrition, nausea, mucositis, at risk for VOD, at risk for gastritis, WPW, recent epistaxis and anticipatory guidance  re: expected count recovery and other potential transplant related complications. Will cycle TPN to 12 hours today. Will wean morphine per Isael's request.     My care coordination activities today include oversight of planned lab studies, oversight of medication changes and discussion with BMT team-members.    My total floor time today was greater than 35 minutes, at least 50% of which was counseling and coordination of care.    Jaquelin Senior MD    Pediatric Blood and Marrow Transplant      Patient Active Problem List   Diagnosis     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)     Aaron-Parkinson-White (WPW) syndrome     Bone marrow transplant candidate     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)     At risk for infection     S/P allogeneic bone marrow transplant (H)     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     Examination of participant or control in clinical research

## 2021-03-04 LAB
ABO + RH BLD: NORMAL
ABO + RH BLD: NORMAL
ALBUMIN SERPL-MCNC: 3 G/DL (ref 3.4–5)
ALP SERPL-CCNC: 76 U/L (ref 40–150)
ALT SERPL W P-5'-P-CCNC: 29 U/L (ref 0–70)
ANION GAP SERPL CALCULATED.3IONS-SCNC: 6 MMOL/L (ref 3–14)
APTT PPP: 51 SEC (ref 22–37)
AST SERPL W P-5'-P-CCNC: 18 U/L (ref 0–45)
BILIRUB SERPL-MCNC: 1.2 MG/DL (ref 0.2–1.3)
BLD GP AB SCN SERPL QL: NORMAL
BLD PROD TYP BPU: NORMAL
BLD UNIT ID BPU: 0
BLOOD BANK CMNT PATIENT-IMP: NORMAL
BLOOD PRODUCT CODE: NORMAL
BPU ID: NORMAL
BUN SERPL-MCNC: 27 MG/DL (ref 7–30)
CALCIUM SERPL-MCNC: 9.3 MG/DL (ref 8.5–10.1)
CHLORIDE SERPL-SCNC: 104 MMOL/L (ref 94–109)
CO2 SERPL-SCNC: 26 MMOL/L (ref 20–32)
CREAT SERPL-MCNC: 0.64 MG/DL (ref 0.66–1.25)
CREAT UR-MCNC: 12 MG/DL
DIFFERENTIAL METHOD BLD: ABNORMAL
ERYTHROCYTE [DISTWIDTH] IN BLOOD BY AUTOMATED COUNT: 10.8 % (ref 10–15)
FIBRINOGEN PPP-MCNC: 560 MG/DL (ref 200–420)
GFR SERPL CREATININE-BSD FRML MDRD: >90 ML/MIN/{1.73_M2}
GLUCOSE SERPL-MCNC: 140 MG/DL (ref 70–99)
HCT VFR BLD AUTO: 22 % (ref 40–53)
HGB BLD-MCNC: 7.9 G/DL (ref 13.3–17.7)
INR PPP: 1.16 (ref 0.86–1.14)
LDH SERPL L TO P-CCNC: 127 U/L (ref 85–227)
MCH RBC QN AUTO: 30.4 PG (ref 26.5–33)
MCHC RBC AUTO-ENTMCNC: 35.9 G/DL (ref 31.5–36.5)
MCV RBC AUTO: 85 FL (ref 78–100)
NUM BPU REQUESTED: 1
NUM BPU REQUESTED: 3
PLATELET # BLD AUTO: 14 10E9/L (ref 150–450)
POTASSIUM SERPL-SCNC: 4.3 MMOL/L (ref 3.4–5.3)
PROT SERPL-MCNC: 6.3 G/DL (ref 6.8–8.8)
PROT UR-MCNC: <0.05 G/L
PROT/CREAT 24H UR: NORMAL G/G CR (ref 0–0.2)
RBC # BLD AUTO: 2.6 10E12/L (ref 4.4–5.9)
SODIUM SERPL-SCNC: 135 MMOL/L (ref 133–144)
SPECIMEN EXP DATE BLD: NORMAL
TACROLIMUS BLD-MCNC: 13.8 UG/L (ref 5–15)
TME LAST DOSE: NORMAL H
TRANSFUSION STATUS PATIENT QL: NORMAL
WBC # BLD AUTO: 0.2 10E9/L (ref 4–11)

## 2021-03-04 PROCEDURE — 250N000011 HC RX IP 250 OP 636: Performed by: NURSE PRACTITIONER

## 2021-03-04 PROCEDURE — 250N000009 HC RX 250: Performed by: PEDIATRICS

## 2021-03-04 PROCEDURE — 250N000011 HC RX IP 250 OP 636: Performed by: PEDIATRICS

## 2021-03-04 PROCEDURE — 84156 ASSAY OF PROTEIN URINE: CPT | Performed by: PEDIATRICS

## 2021-03-04 PROCEDURE — 86901 BLOOD TYPING SEROLOGIC RH(D): CPT | Performed by: PEDIATRICS

## 2021-03-04 PROCEDURE — 250N000009 HC RX 250: Performed by: NURSE PRACTITIONER

## 2021-03-04 PROCEDURE — 250N000011 HC RX IP 250 OP 636: Performed by: STUDENT IN AN ORGANIZED HEALTH CARE EDUCATION/TRAINING PROGRAM

## 2021-03-04 PROCEDURE — 99233 SBSQ HOSP IP/OBS HIGH 50: CPT | Performed by: PEDIATRICS

## 2021-03-04 PROCEDURE — 206N000001 HC R&B BMT UMMC

## 2021-03-04 PROCEDURE — C9113 INJ PANTOPRAZOLE SODIUM, VIA: HCPCS | Performed by: NURSE PRACTITIONER

## 2021-03-04 PROCEDURE — 87081 CULTURE SCREEN ONLY: CPT | Performed by: PEDIATRICS

## 2021-03-04 PROCEDURE — 258N000003 HC RX IP 258 OP 636: Performed by: PEDIATRICS

## 2021-03-04 PROCEDURE — 80053 COMPREHEN METABOLIC PANEL: CPT | Performed by: NURSE PRACTITIONER

## 2021-03-04 PROCEDURE — 86922 COMPATIBILITY TEST ANTIGLOB: CPT | Performed by: PEDIATRICS

## 2021-03-04 PROCEDURE — 85027 COMPLETE CBC AUTOMATED: CPT

## 2021-03-04 PROCEDURE — 258N000001 HC RX 258: Performed by: NURSE PRACTITIONER

## 2021-03-04 PROCEDURE — 85384 FIBRINOGEN ACTIVITY: CPT | Performed by: PEDIATRICS

## 2021-03-04 PROCEDURE — 258N000003 HC RX IP 258 OP 636: Performed by: NURSE PRACTITIONER

## 2021-03-04 PROCEDURE — 85730 THROMBOPLASTIN TIME PARTIAL: CPT | Performed by: PEDIATRICS

## 2021-03-04 PROCEDURE — 250N000013 HC RX MED GY IP 250 OP 250 PS 637: Performed by: NURSE PRACTITIONER

## 2021-03-04 PROCEDURE — 86900 BLOOD TYPING SEROLOGIC ABO: CPT | Performed by: PEDIATRICS

## 2021-03-04 PROCEDURE — P9037 PLATE PHERES LEUKOREDU IRRAD: HCPCS | Performed by: NURSE PRACTITIONER

## 2021-03-04 PROCEDURE — 81003 URINALYSIS AUTO W/O SCOPE: CPT | Performed by: NURSE PRACTITIONER

## 2021-03-04 PROCEDURE — 83615 LACTATE (LD) (LDH) ENZYME: CPT | Performed by: NURSE PRACTITIONER

## 2021-03-04 PROCEDURE — 80197 ASSAY OF TACROLIMUS: CPT | Performed by: NURSE PRACTITIONER

## 2021-03-04 PROCEDURE — 86850 RBC ANTIBODY SCREEN: CPT | Performed by: PEDIATRICS

## 2021-03-04 PROCEDURE — 85610 PROTHROMBIN TIME: CPT | Performed by: PEDIATRICS

## 2021-03-04 PROCEDURE — P9040 RBC LEUKOREDUCED IRRADIATED: HCPCS | Performed by: PEDIATRICS

## 2021-03-04 RX ADMIN — DIPHENHYDRAMINE HYDROCHLORIDE 50 MG: 50 INJECTION, SOLUTION INTRAMUSCULAR; INTRAVENOUS at 15:13

## 2021-03-04 RX ADMIN — MICAFUNGIN SODIUM 50 MG: 50 INJECTION, POWDER, LYOPHILIZED, FOR SOLUTION INTRAVENOUS at 10:52

## 2021-03-04 RX ADMIN — Medication 315 MCG: at 21:00

## 2021-03-04 RX ADMIN — LORAZEPAM 1 MG: 2 INJECTION INTRAMUSCULAR; INTRAVENOUS at 22:33

## 2021-03-04 RX ADMIN — ACYCLOVIR SODIUM 600 MG: 50 INJECTION, SOLUTION INTRAVENOUS at 03:34

## 2021-03-04 RX ADMIN — URSODIOL 600 MG: 300 CAPSULE ORAL at 14:03

## 2021-03-04 RX ADMIN — NYSTATIN: 100000 OINTMENT TOPICAL at 21:06

## 2021-03-04 RX ADMIN — PANTOPRAZOLE SODIUM 40 MG: 40 INJECTION, POWDER, FOR SOLUTION INTRAVENOUS at 08:06

## 2021-03-04 RX ADMIN — MORPHINE SULFATE 1 MG: 2 INJECTION, SOLUTION INTRAMUSCULAR; INTRAVENOUS at 16:26

## 2021-03-04 RX ADMIN — LORAZEPAM 1 MG: 2 INJECTION INTRAMUSCULAR; INTRAVENOUS at 17:17

## 2021-03-04 RX ADMIN — MORPHINE SULFATE 1.2 MG: 2 INJECTION, SOLUTION INTRAMUSCULAR; INTRAVENOUS at 06:10

## 2021-03-04 RX ADMIN — FUROSEMIDE 10 MG: 10 INJECTION, SOLUTION INTRAMUSCULAR; INTRAVENOUS at 08:06

## 2021-03-04 RX ADMIN — ACETAMINOPHEN 500 MG: 500 TABLET, FILM COATED ORAL at 15:13

## 2021-03-04 RX ADMIN — PANTOPRAZOLE SODIUM 40 MG: 40 INJECTION, POWDER, FOR SOLUTION INTRAVENOUS at 21:00

## 2021-03-04 RX ADMIN — MORPHINE SULFATE 1.2 MG: 2 INJECTION, SOLUTION INTRAMUSCULAR; INTRAVENOUS at 01:45

## 2021-03-04 RX ADMIN — CEFEPIME 2 G: 2 INJECTION, POWDER, FOR SOLUTION INTRAVENOUS at 21:05

## 2021-03-04 RX ADMIN — SODIUM CHLORIDE: 234 INJECTION INTRAMUSCULAR; INTRAVENOUS; SUBCUTANEOUS at 20:34

## 2021-03-04 RX ADMIN — MYCOPHENOLATE MOFETIL 960 MG: 500 INJECTION, POWDER, LYOPHILIZED, FOR SOLUTION INTRAVENOUS at 22:35

## 2021-03-04 RX ADMIN — ACYCLOVIR SODIUM 600 MG: 50 INJECTION, SOLUTION INTRAVENOUS at 18:50

## 2021-03-04 RX ADMIN — LORAZEPAM 1 MG: 2 INJECTION INTRAMUSCULAR; INTRAVENOUS at 05:01

## 2021-03-04 RX ADMIN — LORAZEPAM 1 MG: 2 INJECTION INTRAMUSCULAR; INTRAVENOUS at 10:52

## 2021-03-04 RX ADMIN — MORPHINE SULFATE 1.2 MG: 2 INJECTION, SOLUTION INTRAMUSCULAR; INTRAVENOUS at 10:53

## 2021-03-04 RX ADMIN — DIPHENHYDRAMINE HYDROCHLORIDE 50 MG: 50 INJECTION, SOLUTION INTRAMUSCULAR; INTRAVENOUS at 08:51

## 2021-03-04 RX ADMIN — DEXTROSE AND SODIUM CHLORIDE: 5; 450 INJECTION, SOLUTION INTRAVENOUS at 18:50

## 2021-03-04 RX ADMIN — DIPHENHYDRAMINE HYDROCHLORIDE 50 MG: 50 INJECTION, SOLUTION INTRAMUSCULAR; INTRAVENOUS at 03:32

## 2021-03-04 RX ADMIN — SODIUM CHLORIDE 500 ML: 9 INJECTION, SOLUTION INTRAVENOUS at 16:27

## 2021-03-04 RX ADMIN — ACETAMINOPHEN 500 MG: 500 TABLET, FILM COATED ORAL at 03:32

## 2021-03-04 RX ADMIN — MORPHINE SULFATE 1.2 MG: 2 INJECTION, SOLUTION INTRAMUSCULAR; INTRAVENOUS at 18:26

## 2021-03-04 RX ADMIN — ACETAMINOPHEN 500 MG: 500 TABLET, FILM COATED ORAL at 08:51

## 2021-03-04 RX ADMIN — DEXTROSE AND SODIUM CHLORIDE: 5; 450 INJECTION, SOLUTION INTRAVENOUS at 20:34

## 2021-03-04 RX ADMIN — MORPHINE SULFATE 1.2 MG: 2 INJECTION, SOLUTION INTRAMUSCULAR; INTRAVENOUS at 14:52

## 2021-03-04 RX ADMIN — MYCOPHENOLATE MOFETIL 960 MG: 500 INJECTION, POWDER, LYOPHILIZED, FOR SOLUTION INTRAVENOUS at 13:04

## 2021-03-04 RX ADMIN — URSODIOL 600 MG: 300 CAPSULE ORAL at 21:00

## 2021-03-04 RX ADMIN — I.V. FAT EMULSION 240 ML: 20 EMULSION INTRAVENOUS at 20:34

## 2021-03-04 RX ADMIN — ACYCLOVIR SODIUM 600 MG: 50 INJECTION, SOLUTION INTRAVENOUS at 11:36

## 2021-03-04 RX ADMIN — MYCOPHENOLATE MOFETIL 960 MG: 500 INJECTION, POWDER, LYOPHILIZED, FOR SOLUTION INTRAVENOUS at 06:12

## 2021-03-04 RX ADMIN — DEXTROSE MONOHYDRATE 0.02 MG/KG/DAY: 5 INJECTION, SOLUTION INTRAVENOUS at 20:34

## 2021-03-04 RX ADMIN — CEFEPIME 2 G: 2 INJECTION, POWDER, FOR SOLUTION INTRAVENOUS at 05:01

## 2021-03-04 RX ADMIN — CEFEPIME 2 G: 2 INJECTION, POWDER, FOR SOLUTION INTRAVENOUS at 12:37

## 2021-03-04 RX ADMIN — MORPHINE SULFATE 1.2 MG: 2 INJECTION, SOLUTION INTRAMUSCULAR; INTRAVENOUS at 22:40

## 2021-03-04 RX ADMIN — URSODIOL 600 MG: 300 CAPSULE ORAL at 08:05

## 2021-03-04 ASSESSMENT — MIFFLIN-ST. JEOR: SCORE: 1623.37

## 2021-03-04 NOTE — PROGRESS NOTES
Pediatric BMT Daily Progress Note    Interval Events: Artemio remains afebrile. Experienced increased nausea in last 24 hours, continues to have no oral intake other than sips of water. Intermittent lower back pain. Reports non specific  periumbilical abdominal discomfort, no increased discomfort with palpation. Had a stool today. Artemio has not used any prn pain medications for back or abdominal pain.     Review of Systems: Pertinent positives include those mentioned in interval events. A complete review of systems was performed and is otherwise negative.      Medications:  Please see MAR    Physical Exam:    Temp:  [97.9  F (36.6  C)-99.6  F (37.6  C)] 97.9  F (36.6  C)  Pulse:  [] 80  Resp:  [18-24] 20  BP: ()/(56-82) 114/82  Cuff Mean (mmHg):  [70-89] 89  SpO2:  [99 %-100 %] 99 %     I/O last 3 completed shifts:  In: 4324.79 [I.V.:1490.79; Other:5]  Out: 4090 [Urine:3540; Emesis/NG output:150; Stool:400]  GEN: More interactive and communicative in the afternoon, dad at bedside in afternoon.   HEENT: Head NC/AT, PERRL, EOMs intact, sclerae clear, nares patent, MMM.   CARD: RRR, no murmur.  RESP: Lungs clear to auscultation bilaterally. Normal work of breathing.  ABD: Soft, NT, ND, bowel sounds present but slower no organomegaly or masses noted.   EXTREM: WWP, MAEE  SKIN: Dry skin; mild maculopapular rash near CVL dressing on L chest with slightly interim improvement, absent elsewhere. Barbie-area not examined today.  NEURO: No focal deficits  ACCESS: Double lumen burns, port (not accessed)    Labs: All reviewed with pertinent positives: Hgb 7.9 , Plts 14k; BUN 27 , Creat 0.64    Assessment and Plan: Artemio is a 21 yo male with history of multiply relapsed ALL now day +16 s/p UCBT following MAC per 2013-09. Afebrile and clinically stable. Awaiting count recovery. Now receiving 2 units of red cells for hemoglobin <8 . Monitoring intermittent lower back pain and abdominal pain.    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 S/p preparative regimen per [id number] with Thiotepa (days -7 trough -6), Fludarabine (days -5 through -3), Busulfan (days -5 through -3), ATG (days -4 through -2) followed by UCBT (2/16).   - Awaiting count recovery, WBC 0.2 today  - Disease (BMBx) and donor evaluations due day +21     # Risk for GVHD:   - Continue Tacrolimus, goal 5-10 . Daily levels and will adjust his dose as needed.   - Continue MMF-- levels WNL    FEN/Renal:  # Risk for malnutrition: intake minimal  - Regular diet as tolerated  - Dietician to follow  - Continue TPN/IL (cycled to 12 hours)     # Electrolyte abnormalities:    - Check daily electrolytes   - Replace/adjust in TPN as indicated     # Risk for renal dysfunction and fluid overload: GFR (1/28): 119 mls/min  - Monitor I/O's and daily weights    # Risk for aHUS/TA-TMA:    - Monitor LDH qMon/Thurs: 119 (3/1)  - Monitor urine protein/creatinine qTuesday: 0.29 (3/2 )     Pulmonary:  # Risk for pulmonary insufficiency: No current concerns    Cardiovascular:  # Asymptomatic WPW syndrome (diagnosed 2018): no interventions to date. Most recent EKG (1/27). ECHO (1/26) normal, EF 64%. 24h Zio patch holter (1/27-1/28) revealed c/w WPW with no ventricular ectopies present. Cardiology following. BNP elevated 664 (2/16) likely attributed to fluid shifts per cardiology. BNP threshold of concern is >5000.  - Obtain weekly (Tu) BNP (WNL at 79 on 3/2) and troponin (WNK at 0.018 on 3/2)  - Follow up ECHO in 6 mos (7/2021)     # Risk for hypertension secondary to medications: normotensive currently, monitoring     Heme:   # Pancytopenia secondary to chemotherapy  - Transfuse for hemoglobin < 8, platelets parameter 20k  (epistaxis)  - Hgb parameter changed tto <8 g/dL due to concern for mild dizziness, pallor, and malaise with hgb in the 7's. Now receiving 2 units instead of one.   - Hx of transfusion reactions (hives): Premedicate with tylenol and benadryl prior to pRBCs and plts  - GCSF daily until ANC >/= 2500 x 3 consecutive days    # Recent hx of epistaxis:   - Completed 3 day course of afrin (2/23-2/25) -- Continue PRN  - Maintain plt parameter at 20K  - Platelet check daily     Infectious Disease:  # Fever in context of BMT conditioning and central access (2/13):  - Of note, also has un-accessed port-a-cath.   - Continue empiric Cefepime through count recovery    # C.Difficile colitis: Diagnosed 2/17  - 10d course of oral vancomycin complete 2/26    # Risk for infection given immunocompromised status with need for prophylaxis:  - Viral (CMV/HSV sero positive): HD Acyclovir. Weekly CMV neg 3/3  - Fungal: Micafungin. Transition to either itraconazole or posaconazole when tolerating PO.  - Bacterial: Cefepime as above  - PCP: Pentamidine given 2/15. Consider Bactrim next month if WBC criteria met (no hx of adverse reaction).  - Hypogammaglobinemia: IgG 433 2/26, continue checking q2 weeks and replace if <400 (no hx replacement at [facility])      Past infections: C. Diff (November 2020)     GI:   # Reflux:  - BID protonix   - PRN Tums     # Nausea: s/p 3d course of emend x2 (2/16-2/18 and 2/21-2/23)   - Ativan q6h  - Benadryl prn     # Risk for VOD/elastography study participant: most recent abd US (2/22) revealed stable, mild hepatomegaly  - Ursodiol TID   - Labs and imaging per study-- stable hepatomegaly     # Abdominal discomfort: Periumbilical and lower abdominal discomfort. No guarding or increased discomfort on palpation  -Continue to monitor      Neuro:  # Mucositis/abdominal pain:    - Tylenol PRN  - Mugard QID PRN- home medication  - Morphine to 1.20 mg q4H scheduled with 1 mg PRN q2H (not PCA as he does not want to wear  pulse ox). Will not wean today.   - Encourage warm packs     # History of significant spinal headaches: consider using Candelario needle with lumbar punctures    # CRS history. 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 No CRS, neurotoxicity or infectious complications from [place] CAR-T. Continue Q 2 weeks IGG levels     Derm:  # Barbie-area rash: nystatin ointment BID (started )    Fertility: Sperm collected previously collected for fertility preservation     Discharge Considerations: Expected lengths of hospitalization for patients undergoing stem cell transplantation vary by primary diagnosis, conditioning regimen, graft source, and development of complications. A typical stay is 6 weeks.    The above plan of care was developed by and communicated to me by the Pediatric BMT attending physician, Amisha Senior MD.    Lilia Prabhakar MSN, [facility]-  Pediatric Blood and Marrow Transplant Program  [facility] [phone]  Pager [phone]    BMT Attending Note:    Artemio was seen and evaluated by me today.     The significant interval history includes: Increased nausea with some abdominal pain today.        I have reviewed changes and data from the last 24 hours, including medications, laboratory results and vital signs.     I have formulated and discussed the plan with the BMT team. I discussed the course and plan with the patient/family and answered all of their questions to the best of my ability. I counseled them regarding the followin y/o M w/ h/o multiply relapsed Pre-B cell ALL (s/p MSD BMT, CART-19, CART-22) in CR3 here for consolidation of remission with MAC (FluBuTT ATG) conditioning, now S/P UCBT, awaiting count recovery, at risk for GVHD, at high risk for opportunistic infection, fever with ATG, C/ difficile colitis, at risk for malnutrition, nausea, mucositis, at risk for VOD, at risk for gastritis, WPW, recent  epistaxis and anticipatory guidance re: expected count recovery and other potential transplant related complications. Monitor abdominal pain. No change to pain medications today.     My care coordination activities today include oversight of planned lab studies, oversight of medication changes and discussion with BMT team-members.    My total floor time today was greater than 35 minutes, at least 50% of which was counseling and coordination of care.    Jaquelin Senior MD    Pediatric Blood and Marrow Transplant      Patient Active Problem List   Diagnosis     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)     Aaron-Parkinson-White (WPW) syndrome     Bone marrow transplant candidate     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)     At risk for infection     S/P allogeneic bone marrow transplant (H)     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     Examination of participant or control in clinical research

## 2021-03-04 NOTE — PLAN OF CARE
Tmax 99.2, OVSS. Denying pain and nausea. No appetite. RBC given x1 today, pre meds given - tolerated well. Patient sleeping/resting majority of day, up playing videos games this evening. Parents at bedside majority of day. Continue with POC, notify  MD of changes.

## 2021-03-04 NOTE — PLAN OF CARE
Patient has been afebrile, other vital signs are within parameter. Lung sounds clear bilaterally diminished on the bases, SaO2 in the upper 90's on room air. Continues on Tacrolimus drip. Platelets given, tolerated transfusion well. Patient still needs PRBC. Good urine output, no stool this shift. Had 1X emesis, already on scheduled antiemetics. Hourly rounding completed. Continue to monitor and refer for any concerns.

## 2021-03-04 NOTE — PLAN OF CARE
Afebrile, vss, lungs clear, diminished in bases bilaterally.   Sats high 90s on room air.  No complaints. Appeared to have UE shaking this morning when grabbing his oral meds. JOHN López aware.   PRBCs given this morning. Mom expressing frustration that blood products are not given back to back (to maximize pre meds as to not repeat them) , and as soon as labs come back. She wanted to schedule blood products each day.   Explained to mom that we can't predict day to day whether they would be needed and which ones would be required each day. Explained that he had meds that we had to work around for timing of infusion of blood, so it would be difficult to always give products back to back.   Will give second unit of cells later this afternoon.   Patient up in bed doing lego project and video games.   Very little po intake noted.   Large soft stool this morning.   Urine with strong odor, travis this morning, red tinged at 1500. JOHN López informed.   Hourly rounding completed. Continue with plan of care.

## 2021-03-04 NOTE — PROGRESS NOTES
CLINICAL NUTRITION SERVICES - REASSESSMENT NOTE    ANTHROPOMETRICS  Height: 173.5 cm  Weight (3/3): 64.5 kg   BMI: 20.8 kg / m2  Dosing Weight: 64.5 kg - admission weight used for TPN  Comments/Average Daily Weight Gain: Weight has fluctuated between 63.5-64.9 kg and has remained stable over the past week.     CURRENT NUTRITION ORDERS  Orders Placed This Encounter      Regular Diet Adult    Supplement: Allow pt to order snacks/supplements PRN    CURRENT NUTRITION SUPPORT   Parenteral Nutrition:  Type of Parenteral Access: Central  PN frequency: Cycled, 12 hrs     PN of 1800mLs, 236 g Dex, GIR of 5.56 mg/kg/min, 100g Amino Acids, (1.55 g/kg), 240 mL lipids, 0.8 g/kg for 1670 kcals, (26 kcal/kg) with 29 % of kcal from lipids. PN is meeting 100% of kcal needs and 100% of protein needs.    Intake/Tolerance: TPN currently meeting 100% of assessed needs and pt tolerating well. TPN cycled down to 12 hrs over the past week.     Per chart review, limited po intake over the past week. Pt had some applesauce per flowsheets. Pain and nausea under control over the past week. A few emesis throughout the week.    Per discussion with mother, pt did not like the magic cup sent last week and they were not interested in trying others at this time. Pt has not been eating anything over the past week, however mom was able to find the brand of applesauce the pt prefers. Mother had no further questions or concerns at this time.     Current factors affecting nutrition intake include:nausea, vomiting and medical course    NEW FINDINGS:  BMT #2 Day +16    LABS  Labs reviewed   - elevated     MEDICATIONS  Medications reviewed    ASSESSED NUTRITION NEEDS:  Valentino Equation: BMR (1660) x 1.2 - 1.4 = 7036-9638 kcal   Estimated Energy Needs: 30-35 kcal/kg EN/PO; 25-30 kcal/kg PN; 25-35 kcal/kg PN+EN  Estimated Protein Needs: 1.5-2g/kg  Estimated Fluid Needs: 1 ml/kcal or per MD    Micronutrient Needs: per RDA    PEDIATRIC NUTRITION STATUS  VALIDATION  Patient does not meet criteria for malnutrition.    EVALUATION OF PREVIOUS PLAN OF CARE:   Monitoring from previous assessment:  Food and Beverage intake -- Limited po intake over the past week per chart review. Pt had some applesauce earlier in the week.   Enteral and parenteral nutrition intake -- TPN meeting 100% of assessed needs at this time and pt tolerating well. TPN was cycled down to 12 hrs over the past week.   Anthropometric measurements -- Weight has fluctuated between 63.5-64.9 kg and has remained stable over the past week.     Previous Goals:   1. Po and/or nutrition support to meet greater than 75% of needs - met  2. Weight maintenance during hospital stay - met    Previous Nutrition Diagnosis:   Predicted suboptimal nutrient intake related to decline in po as evidence by reliance on TPN to meet estimated needs with potential for interruption.  Evaluation: No change    NUTRITION DIAGNOSIS:  Predicted suboptimal nutrient intake related to decline in po as evidence by reliance on TPN to meet estimated needs with potential for interruption.    INTERVENTIONS  Nutrition Prescription  Po/nutrition support to meet needs for age appropriate growth    Implementation:  Parenteral Nutrition -- continue with current regimen  Collaboration and Referral of Nutrition care -- discussed plan of care for pt during rounds    Goals  1. Po and/or nutrition support to meet greater than 75% of needs  2. Weight maintenance during hospital stay    FOLLOW UP/MONITORING  Food and Beverage intake --  Enteral and parenteral nutrition intake --  Anthropometric measurements --    RECOMMENDATIONS  1. Continue with current TPN regimen over 12 hrs of 1800mLs, 236 g Dex, GIR of 5.56 mg/kg/min, 100g Amino Acids, (1.55 g/kg), 240 mL lipids, 0.8 g/kg for 1670 kcals, (26 kcal/kg) with 29 % of kcal from lipids. PN is meeting 100% of kcal needs and 100% of protein needs.    2. Monitor weight trends throughout admission.    Tita  TYLOR Guillen RDN  Pager: [phone]

## 2021-03-05 LAB
ALBUMIN UR-MCNC: NEGATIVE MG/DL
ANION GAP SERPL CALCULATED.3IONS-SCNC: 3 MMOL/L (ref 3–14)
ANION GAP SERPL CALCULATED.3IONS-SCNC: 5 MMOL/L (ref 3–14)
APPEARANCE UR: CLEAR
BILIRUB UR QL STRIP: NEGATIVE
BLD PROD TYP BPU: NORMAL
BLD PROD TYP BPU: NORMAL
BLD UNIT ID BPU: 0
BLOOD PRODUCT CODE: NORMAL
BPU ID: NORMAL
BUN SERPL-MCNC: 22 MG/DL (ref 7–30)
BUN SERPL-MCNC: 25 MG/DL (ref 7–30)
CALCIUM SERPL-MCNC: 9 MG/DL (ref 8.5–10.1)
CALCIUM SERPL-MCNC: 9.2 MG/DL (ref 8.5–10.1)
CHLORIDE SERPL-SCNC: 106 MMOL/L (ref 94–109)
CHLORIDE SERPL-SCNC: 107 MMOL/L (ref 94–109)
CO2 SERPL-SCNC: 25 MMOL/L (ref 20–32)
CO2 SERPL-SCNC: 25 MMOL/L (ref 20–32)
COLOR UR AUTO: ABNORMAL
CREAT SERPL-MCNC: 0.59 MG/DL (ref 0.66–1.25)
CREAT SERPL-MCNC: 0.69 MG/DL (ref 0.66–1.25)
DIFFERENTIAL METHOD BLD: ABNORMAL
ERYTHROCYTE [DISTWIDTH] IN BLOOD BY AUTOMATED COUNT: 11.2 % (ref 10–15)
GFR SERPL CREATININE-BSD FRML MDRD: >90 ML/MIN/{1.73_M2}
GFR SERPL CREATININE-BSD FRML MDRD: >90 ML/MIN/{1.73_M2}
GLUCOSE SERPL-MCNC: 107 MG/DL (ref 70–99)
GLUCOSE SERPL-MCNC: 154 MG/DL (ref 70–99)
GLUCOSE UR STRIP-MCNC: 30 MG/DL
HCT VFR BLD AUTO: 29.2 % (ref 40–53)
HGB BLD-MCNC: 10.4 G/DL (ref 13.3–17.7)
HGB UR QL STRIP: NEGATIVE
KETONES UR STRIP-MCNC: NEGATIVE MG/DL
LEUKOCYTE ESTERASE UR QL STRIP: NEGATIVE
MAGNESIUM SERPL-MCNC: 1.8 MG/DL (ref 1.6–2.3)
MCH RBC QN AUTO: 29.8 PG (ref 26.5–33)
MCHC RBC AUTO-ENTMCNC: 35.6 G/DL (ref 31.5–36.5)
MCV RBC AUTO: 84 FL (ref 78–100)
NITRATE UR QL: NEGATIVE
NUM BPU REQUESTED: 1
PH UR STRIP: 5.5 PH (ref 5–7)
PHOSPHATE SERPL-MCNC: 3.2 MG/DL (ref 2.5–4.5)
PLATELET # BLD AUTO: 16 10E9/L (ref 150–450)
POTASSIUM SERPL-SCNC: 4 MMOL/L (ref 3.4–5.3)
POTASSIUM SERPL-SCNC: 4.4 MMOL/L (ref 3.4–5.3)
RBC # BLD AUTO: 3.49 10E12/L (ref 4.4–5.9)
SODIUM SERPL-SCNC: 135 MMOL/L (ref 133–144)
SODIUM SERPL-SCNC: 136 MMOL/L (ref 133–144)
SOURCE: ABNORMAL
SP GR UR STRIP: 1.01 (ref 1–1.03)
TACROLIMUS BLD-MCNC: 15.2 UG/L (ref 5–15)
TME LAST DOSE: ABNORMAL H
TRANSFUSION STATUS PATIENT QL: NORMAL
TRANSFUSION STATUS PATIENT QL: NORMAL
UROBILINOGEN UR STRIP-MCNC: NORMAL MG/DL (ref 0–2)
WBC # BLD AUTO: 0.3 10E9/L (ref 4–11)

## 2021-03-05 PROCEDURE — 250N000011 HC RX IP 250 OP 636: Performed by: PEDIATRICS

## 2021-03-05 PROCEDURE — 250N000011 HC RX IP 250 OP 636: Performed by: NURSE PRACTITIONER

## 2021-03-05 PROCEDURE — 99233 SBSQ HOSP IP/OBS HIGH 50: CPT | Performed by: PEDIATRICS

## 2021-03-05 PROCEDURE — 85027 COMPLETE CBC AUTOMATED: CPT

## 2021-03-05 PROCEDURE — 250N000013 HC RX MED GY IP 250 OP 250 PS 637: Performed by: NURSE PRACTITIONER

## 2021-03-05 PROCEDURE — 206N000001 HC R&B BMT UMMC

## 2021-03-05 PROCEDURE — C9113 INJ PANTOPRAZOLE SODIUM, VIA: HCPCS | Performed by: NURSE PRACTITIONER

## 2021-03-05 PROCEDURE — 250N000009 HC RX 250: Performed by: PEDIATRICS

## 2021-03-05 PROCEDURE — 258N000003 HC RX IP 258 OP 636: Performed by: PEDIATRICS

## 2021-03-05 PROCEDURE — 85025 COMPLETE CBC W/AUTO DIFF WBC: CPT | Performed by: NURSE PRACTITIONER

## 2021-03-05 PROCEDURE — 84100 ASSAY OF PHOSPHORUS: CPT | Performed by: NURSE PRACTITIONER

## 2021-03-05 PROCEDURE — 80197 ASSAY OF TACROLIMUS: CPT | Performed by: NURSE PRACTITIONER

## 2021-03-05 PROCEDURE — 80048 BASIC METABOLIC PNL TOTAL CA: CPT | Performed by: NURSE PRACTITIONER

## 2021-03-05 PROCEDURE — P9037 PLATE PHERES LEUKOREDU IRRAD: HCPCS | Performed by: NURSE PRACTITIONER

## 2021-03-05 PROCEDURE — 83735 ASSAY OF MAGNESIUM: CPT | Performed by: NURSE PRACTITIONER

## 2021-03-05 PROCEDURE — 250N000009 HC RX 250: Performed by: NURSE PRACTITIONER

## 2021-03-05 RX ORDER — VANCOMYCIN HYDROCHLORIDE 125 MG/1
125 CAPSULE ORAL 4 TIMES DAILY
Status: COMPLETED | OUTPATIENT
Start: 2021-03-05 | End: 2021-03-15

## 2021-03-05 RX ORDER — FUROSEMIDE 10 MG/ML
20 INJECTION INTRAMUSCULAR; INTRAVENOUS ONCE
Status: DISCONTINUED | OUTPATIENT
Start: 2021-03-05 | End: 2021-03-06

## 2021-03-05 RX ORDER — FUROSEMIDE 10 MG/ML
15 INJECTION INTRAMUSCULAR; INTRAVENOUS ONCE
Status: DISCONTINUED | OUTPATIENT
Start: 2021-03-05 | End: 2021-03-05

## 2021-03-05 RX ADMIN — MYCOPHENOLATE MOFETIL 960 MG: 500 INJECTION, POWDER, LYOPHILIZED, FOR SOLUTION INTRAVENOUS at 22:22

## 2021-03-05 RX ADMIN — URSODIOL 600 MG: 300 CAPSULE ORAL at 14:20

## 2021-03-05 RX ADMIN — MORPHINE SULFATE 1.2 MG: 2 INJECTION, SOLUTION INTRAMUSCULAR; INTRAVENOUS at 18:13

## 2021-03-05 RX ADMIN — MYCOPHENOLATE MOFETIL 960 MG: 500 INJECTION, POWDER, LYOPHILIZED, FOR SOLUTION INTRAVENOUS at 15:17

## 2021-03-05 RX ADMIN — PANTOPRAZOLE SODIUM 40 MG: 40 INJECTION, POWDER, FOR SOLUTION INTRAVENOUS at 08:41

## 2021-03-05 RX ADMIN — URSODIOL 600 MG: 300 CAPSULE ORAL at 20:23

## 2021-03-05 RX ADMIN — MORPHINE SULFATE 1.2 MG: 2 INJECTION, SOLUTION INTRAMUSCULAR; INTRAVENOUS at 22:21

## 2021-03-05 RX ADMIN — ACYCLOVIR SODIUM 600 MG: 50 INJECTION, SOLUTION INTRAVENOUS at 11:13

## 2021-03-05 RX ADMIN — MYCOPHENOLATE MOFETIL 960 MG: 500 INJECTION, POWDER, LYOPHILIZED, FOR SOLUTION INTRAVENOUS at 06:24

## 2021-03-05 RX ADMIN — LORAZEPAM 1 MG: 2 INJECTION INTRAMUSCULAR; INTRAVENOUS at 16:57

## 2021-03-05 RX ADMIN — MORPHINE SULFATE 1.2 MG: 2 INJECTION, SOLUTION INTRAMUSCULAR; INTRAVENOUS at 02:03

## 2021-03-05 RX ADMIN — PANTOPRAZOLE SODIUM 40 MG: 40 INJECTION, POWDER, FOR SOLUTION INTRAVENOUS at 20:51

## 2021-03-05 RX ADMIN — ACETAMINOPHEN 500 MG: 500 TABLET, FILM COATED ORAL at 02:03

## 2021-03-05 RX ADMIN — LORAZEPAM 1 MG: 2 INJECTION INTRAMUSCULAR; INTRAVENOUS at 22:22

## 2021-03-05 RX ADMIN — SODIUM CHLORIDE: 234 INJECTION INTRAMUSCULAR; INTRAVENOUS; SUBCUTANEOUS at 20:10

## 2021-03-05 RX ADMIN — ACYCLOVIR SODIUM 600 MG: 50 INJECTION, SOLUTION INTRAVENOUS at 18:15

## 2021-03-05 RX ADMIN — MORPHINE SULFATE 1.2 MG: 2 INJECTION, SOLUTION INTRAMUSCULAR; INTRAVENOUS at 14:16

## 2021-03-05 RX ADMIN — VANCOMYCIN HYDROCHLORIDE 125 MG: 125 CAPSULE ORAL at 20:23

## 2021-03-05 RX ADMIN — CEFEPIME 2 G: 2 INJECTION, POWDER, FOR SOLUTION INTRAVENOUS at 05:42

## 2021-03-05 RX ADMIN — MORPHINE SULFATE 1.2 MG: 2 INJECTION, SOLUTION INTRAMUSCULAR; INTRAVENOUS at 10:22

## 2021-03-05 RX ADMIN — LORAZEPAM 1 MG: 2 INJECTION INTRAMUSCULAR; INTRAVENOUS at 10:24

## 2021-03-05 RX ADMIN — I.V. FAT EMULSION 240 ML: 20 EMULSION INTRAVENOUS at 20:11

## 2021-03-05 RX ADMIN — MORPHINE SULFATE 1.2 MG: 2 INJECTION, SOLUTION INTRAMUSCULAR; INTRAVENOUS at 05:41

## 2021-03-05 RX ADMIN — URSODIOL 600 MG: 300 CAPSULE ORAL at 08:41

## 2021-03-05 RX ADMIN — ACYCLOVIR SODIUM 600 MG: 50 INJECTION, SOLUTION INTRAVENOUS at 04:04

## 2021-03-05 RX ADMIN — Medication 315 MCG: at 20:20

## 2021-03-05 RX ADMIN — CEFEPIME 2 G: 2 INJECTION, POWDER, FOR SOLUTION INTRAVENOUS at 20:51

## 2021-03-05 RX ADMIN — CEFEPIME 2 G: 2 INJECTION, POWDER, FOR SOLUTION INTRAVENOUS at 14:16

## 2021-03-05 RX ADMIN — DEXTROSE MONOHYDRATE 0.02 MG/KG/DAY: 5 INJECTION, SOLUTION INTRAVENOUS at 20:24

## 2021-03-05 RX ADMIN — VANCOMYCIN HYDROCHLORIDE 125 MG: 125 CAPSULE ORAL at 16:57

## 2021-03-05 RX ADMIN — VANCOMYCIN HYDROCHLORIDE 125 MG: 125 CAPSULE ORAL at 12:51

## 2021-03-05 RX ADMIN — DIPHENHYDRAMINE HYDROCHLORIDE 50 MG: 50 INJECTION, SOLUTION INTRAMUSCULAR; INTRAVENOUS at 02:03

## 2021-03-05 RX ADMIN — LORAZEPAM 1 MG: 2 INJECTION INTRAMUSCULAR; INTRAVENOUS at 04:04

## 2021-03-05 RX ADMIN — MICAFUNGIN SODIUM 50 MG: 50 INJECTION, POWDER, LYOPHILIZED, FOR SOLUTION INTRAVENOUS at 12:49

## 2021-03-05 ASSESSMENT — MIFFLIN-ST. JEOR
SCORE: 1624.37
SCORE: 1625.37

## 2021-03-05 NOTE — PLAN OF CARE
[id number]: Isael - Afebrile,Tmax 99.6. VSS. Lungs clear, diminished in bases on RA. Received platelets with pre-meds benadryl and tylenol with no issues. No N/V or pain reported. Good UOP, pink/travis urine. No stool. Tacro gtt continues. Isael slept throughout night. No family present. Hourly rounding completed. Continue with POC.

## 2021-03-05 NOTE — PLAN OF CARE
Afebrile. VSS. Lungs clear. Complained of cramping stomach pain. Morphine PRN given x1 with good effect. No complaints of nausea. Voiding in toilet so unable to assess urine or collect sample. One watery stool. PRBCs replaced with tylenol and benadryl premeds. Tacro drip decreased. Mom at bedside. Hourly rounding completed.

## 2021-03-05 NOTE — PROGRESS NOTES
Pediatric BMT Daily Progress Note    Interval Events: Artemio remains afebrile. Reports intermittent discomfort described as cramping  in periumbilical to lower left abdominal area no increased discomfort with palpation. Stool frequency increased per Artemio, green and watery. Continues with morning nausea.     Review of Systems: Pertinent positives include those mentioned in interval events. A complete review of systems was performed and is otherwise negative.      Medications:  Please see MAR    Physical Exam:    Temp:  [97.9  F (36.6  C)-99.6  F (37.6  C)] 99.2  F (37.3  C)  Pulse:  [69-97] 73  Resp:  [16-24] 16  BP: (100-121)/(60-82) 112/73  Cuff Mean (mmHg):  [80-89] 84  SpO2:  [97 %-99 %] 99 %     I/O last 3 completed shifts:  In: 4907.49 [I.V.:1283.49; IV Piggyback:500]  Out: 4450 [Urine:3850; Stool:600]  GEN: Interactive, mom at bedside in afternoon.   HEENT: Head NC/AT, PERRL, EOMs intact, sclerae clear, nares patent, MMM.   CARD: RRR, no murmur.  RESP: Lungs clear to auscultation bilaterally. Diminished in the bases.  Normal work of breathing.  ABD: Soft, NT, ND, bowel sounds present but slower no organomegaly or masses noted.   EXTREM: WWP, MAEE  SKIN: Dry skin; mild maculopapular rash near CVL dressing on L chest improved. Barbie-area not examined today.  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 Afebrile and clinically stable. Awaiting count recovery. Now Increased frequency of stool s/p completion of oral Vancomycin for C.Diff infection on 2/26. Will resume a second 10 day course of oral Vancomycin.     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 S/p preparative regimen per [id number] with Thiotepa (days -7 trough -6), Fludarabine (days -5 through -3), Busulfan (days -5 through -3), ATG (days -4 through -2) followed by UCBT (2/16).   - Awaiting count recovery, WBC 0.3 today  - Disease (BMBx) and donor evaluations due day +21     # Risk for GVHD:   - Continue Tacrolimus, goal 5-10 . Daily levels and will adjust his dose as needed.   - Continue MMF-- levels WNL    FEN/Renal:  # Risk for malnutrition: intake minimal  - Regular diet as tolerated  - Dietician to follow  - Continue TPN/IL (cycled to 12 hours)     # Electrolyte abnormalities:    - Check daily electrolytes   - Replace/adjust in TPN as indicated     # Risk for renal dysfunction and fluid overload: GFR (1/28): 119 mls/min  - Monitor I/O's and daily weights    # Risk for aHUS/TA-TMA:    - Monitor LDH qMon/Thurs: 119 (3/1)  - Monitor urine protein/creatinine qTuesday: 0.29 (3/2)     Pulmonary:  # Risk for pulmonary insufficiency: No current concerns    Cardiovascular:  # Asymptomatic WPW syndrome (diagnosed 2018): no interventions to date. Most recent EKG (1/27). ECHO (1/26) normal, EF 64%. 24h Zio patch holter (1/27-1/28) revealed c/w WPW with no ventricular ectopies present. Cardiology following. BNP elevated 664 (2/16) likely attributed to fluid shifts per cardiology. BNP threshold of concern is >5000.  - Obtain weekly (Tu) BNP (WNL at 79 on 3/2) and troponin (WNK at 0.018 on 3/2)  - Follow up ECHO in 6 mos (7/2021)     # Risk for hypertension secondary to medications: normotensive currently, monitoring     Heme:   # Pancytopenia secondary to chemotherapy  - Transfuse for hemoglobin < 8, platelets parameter 20k (epistaxis)  - Hgb parameter changed tto <8 g/dL due to concern for mild dizziness, pallor, and malaise with hgb in the 7's.   - Now  receiving 2 units red cells instead of one, excellent response to yesterday's transfusion with hemoglobin 10.4 today  - Hx of transfusion reactions (hives): Premedicate with tylenol and benadryl prior to pRBCs and plts  - GCSF daily until ANC >/= 2500 x 3 consecutive days    # Recent hx of epistaxis:   - Completed 3 day course of afrin (2/23-2/25) -- Continue PRN  - Maintain plt parameter at 20K  - Platelet check daily     Infectious Disease:  # Fever in context of BMT conditioning and central access (2/13):  - Of note, also has un-accessed port-a-cath.   - Continue empiric Cefepime through count recovery    # C.Difficile colitis: Diagnosed 2/17  - 10d course of oral vancomycin complete 2/26  - Increase in frequency and volume of watery green stool  with lower abdominal cramping (3/4 to 3/5)  - Restart a second 10 day course of vancomycin (3/5 -3/15)    # Risk for infection given immunocompromised status with need for prophylaxis:  - Viral (CMV/HSV sero positive): HD Acyclovir. Weekly CMV neg 3/3  - Fungal: Micafungin. Transition to either itraconazole or posaconazole when tolerating PO.  - Bacterial: Cefepime as above  - PCP: Pentamidine given 2/15. Consider Bactrim next month if WBC criteria met (no hx of adverse reaction).  - Hypogammaglobinemia: IgG 433 2/26, continue checking q2 weeks and replace if <400 (no hx replacement at [facility])      Past infections: C. Diff (November 2020)     GI:   # Reflux:  - BID protonix   - PRN Tums     # Nausea: s/p 3d course of emend x2 (2/16-2/18 and 2/21-2/23)   - Ativan q6h  - Benadryl prn     # Risk for VOD/elastography study participant: most recent abd US (2/22) revealed stable, mild hepatomegaly  - Ursodiol TID   - Labs and imaging per study-- stable hepatomegaly     # Abdominal discomfort: Periumbilical and lower abdominal discomfort.cramping. No guarding or increased discomfort on palpation  - Continue to monitor      Neuro:  # Mucositis/abdominal pain:   - Tylenol PRN  -  Martha QID PRN- home medication  - Morphine to 1.20 mg q4H scheduled with 1 mg PRN q2H (not PCA as he does not want to wear pulse ox). Will not wean today per Artemio  - Encourage warm packs     # History of significant spinal headaches: consider using Candelario needle with lumbar punctures    # CRS history. 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 No CRS, neurotoxicity or infectious complications from [place] CAR-T. Continue Q 2 weeks IGG levels due 3/9.     Derm:  # Barbie-area rash: nystatin ointment BID (started )    Fertility: Sperm collected previously collected for fertility preservation     Discharge Considerations: Expected lengths of hospitalization for patients undergoing stem cell transplantation vary by primary diagnosis, conditioning regimen, graft source, and development of complications. A typical stay is 6 weeks.    The above plan of care was developed by and communicated to me by the Pediatric BMT attending physician, Amisah Senior MD.    Lilia Prabhakar MSN, CPNP-AC  Pediatric Blood and Marrow Transplant Program  [facility] [phone]  Pager [phone]    BMT Attending Note:    Artemio was seen and evaluated by me today.     The significant interval history includes: Abdominal pain requiring an extra dose of morphine last night. Increase loose watery stools in last day.        I have reviewed changes and data from the last 24 hours, including medications, laboratory results and vital signs.     I have formulated and discussed the plan with the BMT team. I discussed the course and plan with the patient/family and answered all of their questions to the best of my ability. I counseled them regarding the followin y/o M w/ h/o multiply relapsed Pre-B cell ALL (s/p MSD BMT, CART-19, CART-) in CR3 here for consolidation of remission with MAC (FluBuTT ATG) conditioning, now S/P UCBT, counts recovering, at risk  for GVHD, at high risk for opportunistic infection, fever with ATG, C. difficile colitis, at risk for malnutrition, nausea, mucositis, at risk for VOD, at risk for gastritis, WPW, recent epistaxis and anticipatory guidance re: expected count recovery and other potential transplant related complications. Will start a second 10 day course of oral vancomycin given his recent C. difficile colitis diagnosis and recurrent watery stools.      My care coordination activities today include oversight of planned lab studies, oversight of medication changes and discussion with BMT team-members.    My total floor time today was greater than 35 minutes, at least 50% of which was counseling and coordination of care.    Jaquelin Senior MD    Pediatric Blood and Marrow Transplant      Patient Active Problem List   Diagnosis     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)     Aaron-Parkinson-White (WPW) syndrome     Bone marrow transplant candidate     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)     At risk for infection     S/P allogeneic bone marrow transplant (H)     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     Examination of participant or control in clinical research

## 2021-03-06 LAB
ANION GAP SERPL CALCULATED.3IONS-SCNC: 6 MMOL/L (ref 3–14)
BACTERIA SPEC CULT: NORMAL
BLD PROD TYP BPU: NORMAL
BLD PROD TYP BPU: NORMAL
BLD UNIT ID BPU: 0
BLOOD PRODUCT CODE: NORMAL
BPU ID: NORMAL
BUN SERPL-MCNC: 20 MG/DL (ref 7–30)
CALCIUM SERPL-MCNC: 8.9 MG/DL (ref 8.5–10.1)
CHLORIDE SERPL-SCNC: 107 MMOL/L (ref 94–109)
CO2 SERPL-SCNC: 24 MMOL/L (ref 20–32)
CREAT SERPL-MCNC: 0.6 MG/DL (ref 0.66–1.25)
DIFFERENTIAL METHOD BLD: ABNORMAL
ERYTHROCYTE [DISTWIDTH] IN BLOOD BY AUTOMATED COUNT: 11.1 % (ref 10–15)
GFR SERPL CREATININE-BSD FRML MDRD: >90 ML/MIN/{1.73_M2}
GLUCOSE SERPL-MCNC: 138 MG/DL (ref 70–99)
HCT VFR BLD AUTO: 27.4 % (ref 40–53)
HGB BLD-MCNC: 9.8 G/DL (ref 13.3–17.7)
IGG SERPL-MCNC: 617 MG/DL (ref 610–1616)
MCH RBC QN AUTO: 30.2 PG (ref 26.5–33)
MCHC RBC AUTO-ENTMCNC: 35.8 G/DL (ref 31.5–36.5)
MCV RBC AUTO: 85 FL (ref 78–100)
NUM BPU REQUESTED: 1
PLATELET # BLD AUTO: 12 10E9/L (ref 150–450)
POTASSIUM SERPL-SCNC: 4.2 MMOL/L (ref 3.4–5.3)
RBC # BLD AUTO: 3.24 10E12/L (ref 4.4–5.9)
SODIUM SERPL-SCNC: 137 MMOL/L (ref 133–144)
SPECIMEN SOURCE: NORMAL
TACROLIMUS BLD-MCNC: 12.9 UG/L (ref 5–15)
TME LAST DOSE: NORMAL H
TRANSFUSION STATUS PATIENT QL: NORMAL
TRANSFUSION STATUS PATIENT QL: NORMAL
WBC # BLD AUTO: 0.4 10E9/L (ref 4–11)

## 2021-03-06 PROCEDURE — 250N000011 HC RX IP 250 OP 636: Performed by: PEDIATRICS

## 2021-03-06 PROCEDURE — C9113 INJ PANTOPRAZOLE SODIUM, VIA: HCPCS | Performed by: NURSE PRACTITIONER

## 2021-03-06 PROCEDURE — 258N000003 HC RX IP 258 OP 636: Performed by: PEDIATRICS

## 2021-03-06 PROCEDURE — 80197 ASSAY OF TACROLIMUS: CPT | Performed by: NURSE PRACTITIONER

## 2021-03-06 PROCEDURE — 250N000011 HC RX IP 250 OP 636: Performed by: NURSE PRACTITIONER

## 2021-03-06 PROCEDURE — 250N000013 HC RX MED GY IP 250 OP 250 PS 637: Performed by: NURSE PRACTITIONER

## 2021-03-06 PROCEDURE — P9037 PLATE PHERES LEUKOREDU IRRAD: HCPCS | Performed by: NURSE PRACTITIONER

## 2021-03-06 PROCEDURE — 80048 BASIC METABOLIC PNL TOTAL CA: CPT | Performed by: NURSE PRACTITIONER

## 2021-03-06 PROCEDURE — 250N000009 HC RX 250: Performed by: PEDIATRICS

## 2021-03-06 PROCEDURE — 250N000009 HC RX 250: Performed by: NURSE PRACTITIONER

## 2021-03-06 PROCEDURE — 206N000001 HC R&B BMT UMMC

## 2021-03-06 PROCEDURE — 85027 COMPLETE CBC AUTOMATED: CPT

## 2021-03-06 PROCEDURE — 99233 SBSQ HOSP IP/OBS HIGH 50: CPT | Performed by: PEDIATRICS

## 2021-03-06 PROCEDURE — 250N000013 HC RX MED GY IP 250 OP 250 PS 637: Performed by: PEDIATRICS

## 2021-03-06 PROCEDURE — 82784 ASSAY IGA/IGD/IGG/IGM EACH: CPT | Performed by: NURSE PRACTITIONER

## 2021-03-06 RX ORDER — MORPHINE SULFATE 2 MG/ML
1 INJECTION, SOLUTION INTRAMUSCULAR; INTRAVENOUS EVERY 4 HOURS
Status: DISCONTINUED | OUTPATIENT
Start: 2021-03-06 | End: 2021-03-07

## 2021-03-06 RX ORDER — LIDOCAINE 4 G/G
1 PATCH TOPICAL
Status: DISCONTINUED | OUTPATIENT
Start: 2021-03-06 | End: 2021-03-12

## 2021-03-06 RX ADMIN — CEFEPIME 2 G: 2 INJECTION, POWDER, FOR SOLUTION INTRAVENOUS at 05:54

## 2021-03-06 RX ADMIN — I.V. FAT EMULSION 240 ML: 20 EMULSION INTRAVENOUS at 20:15

## 2021-03-06 RX ADMIN — MYCOPHENOLATE MOFETIL 960 MG: 500 INJECTION, POWDER, LYOPHILIZED, FOR SOLUTION INTRAVENOUS at 21:42

## 2021-03-06 RX ADMIN — MORPHINE SULFATE 1.2 MG: 2 INJECTION, SOLUTION INTRAMUSCULAR; INTRAVENOUS at 06:39

## 2021-03-06 RX ADMIN — MORPHINE SULFATE 1 MG: 2 INJECTION, SOLUTION INTRAMUSCULAR; INTRAVENOUS at 23:18

## 2021-03-06 RX ADMIN — LORAZEPAM 1 MG: 2 INJECTION INTRAMUSCULAR; INTRAVENOUS at 21:33

## 2021-03-06 RX ADMIN — CEFEPIME 2 G: 2 INJECTION, POWDER, FOR SOLUTION INTRAVENOUS at 13:33

## 2021-03-06 RX ADMIN — MORPHINE SULFATE 1 MG: 2 INJECTION, SOLUTION INTRAMUSCULAR; INTRAVENOUS at 17:56

## 2021-03-06 RX ADMIN — LORAZEPAM 1 MG: 2 INJECTION INTRAMUSCULAR; INTRAVENOUS at 09:59

## 2021-03-06 RX ADMIN — MYCOPHENOLATE MOFETIL 960 MG: 500 INJECTION, POWDER, LYOPHILIZED, FOR SOLUTION INTRAVENOUS at 14:18

## 2021-03-06 RX ADMIN — CEFEPIME 2 G: 2 INJECTION, POWDER, FOR SOLUTION INTRAVENOUS at 20:51

## 2021-03-06 RX ADMIN — DIPHENHYDRAMINE HYDROCHLORIDE 50 MG: 50 INJECTION, SOLUTION INTRAMUSCULAR; INTRAVENOUS at 03:08

## 2021-03-06 RX ADMIN — MORPHINE SULFATE 1.2 MG: 2 INJECTION, SOLUTION INTRAMUSCULAR; INTRAVENOUS at 09:59

## 2021-03-06 RX ADMIN — VANCOMYCIN HYDROCHLORIDE 125 MG: 125 CAPSULE ORAL at 08:40

## 2021-03-06 RX ADMIN — VANCOMYCIN HYDROCHLORIDE 125 MG: 125 CAPSULE ORAL at 20:26

## 2021-03-06 RX ADMIN — PANTOPRAZOLE SODIUM 40 MG: 40 INJECTION, POWDER, FOR SOLUTION INTRAVENOUS at 20:50

## 2021-03-06 RX ADMIN — MICAFUNGIN SODIUM 50 MG: 50 INJECTION, POWDER, LYOPHILIZED, FOR SOLUTION INTRAVENOUS at 11:23

## 2021-03-06 RX ADMIN — URSODIOL 600 MG: 300 CAPSULE ORAL at 08:40

## 2021-03-06 RX ADMIN — ACETAMINOPHEN 500 MG: 500 TABLET, FILM COATED ORAL at 03:08

## 2021-03-06 RX ADMIN — URSODIOL 600 MG: 300 CAPSULE ORAL at 13:33

## 2021-03-06 RX ADMIN — SODIUM CHLORIDE: 234 INJECTION INTRAMUSCULAR; INTRAVENOUS; SUBCUTANEOUS at 20:15

## 2021-03-06 RX ADMIN — LIDOCAINE 1 PATCH: 560 PATCH PERCUTANEOUS; TOPICAL; TRANSDERMAL at 23:18

## 2021-03-06 RX ADMIN — ACYCLOVIR SODIUM 600 MG: 50 INJECTION, SOLUTION INTRAVENOUS at 10:11

## 2021-03-06 RX ADMIN — ACYCLOVIR SODIUM 600 MG: 50 INJECTION, SOLUTION INTRAVENOUS at 03:29

## 2021-03-06 RX ADMIN — MORPHINE SULFATE 1.2 MG: 2 INJECTION, SOLUTION INTRAMUSCULAR; INTRAVENOUS at 03:02

## 2021-03-06 RX ADMIN — VANCOMYCIN HYDROCHLORIDE 125 MG: 125 CAPSULE ORAL at 15:52

## 2021-03-06 RX ADMIN — URSODIOL 600 MG: 300 CAPSULE ORAL at 20:26

## 2021-03-06 RX ADMIN — MORPHINE SULFATE 1 MG: 2 INJECTION, SOLUTION INTRAMUSCULAR; INTRAVENOUS at 13:33

## 2021-03-06 RX ADMIN — Medication 315 MCG: at 20:17

## 2021-03-06 RX ADMIN — MORPHINE SULFATE 1 MG: 2 INJECTION, SOLUTION INTRAMUSCULAR; INTRAVENOUS at 16:03

## 2021-03-06 RX ADMIN — ACYCLOVIR SODIUM 600 MG: 50 INJECTION, SOLUTION INTRAVENOUS at 18:01

## 2021-03-06 RX ADMIN — DEXTROSE MONOHYDRATE 0.01 MG/KG/DAY: 5 INJECTION, SOLUTION INTRAVENOUS at 20:26

## 2021-03-06 RX ADMIN — LORAZEPAM 1 MG: 2 INJECTION INTRAMUSCULAR; INTRAVENOUS at 04:39

## 2021-03-06 RX ADMIN — VANCOMYCIN HYDROCHLORIDE 125 MG: 125 CAPSULE ORAL at 11:31

## 2021-03-06 RX ADMIN — PANTOPRAZOLE SODIUM 40 MG: 40 INJECTION, POWDER, FOR SOLUTION INTRAVENOUS at 08:40

## 2021-03-06 RX ADMIN — MORPHINE SULFATE 1 MG: 2 INJECTION, SOLUTION INTRAMUSCULAR; INTRAVENOUS at 21:32

## 2021-03-06 RX ADMIN — LORAZEPAM 1 MG: 2 INJECTION INTRAMUSCULAR; INTRAVENOUS at 15:51

## 2021-03-06 RX ADMIN — MYCOPHENOLATE MOFETIL 960 MG: 500 INJECTION, POWDER, LYOPHILIZED, FOR SOLUTION INTRAVENOUS at 06:40

## 2021-03-06 ASSESSMENT — MIFFLIN-ST. JEOR: SCORE: 1619.37

## 2021-03-06 NOTE — PLAN OF CARE
Isael has been afebrile, T-max 99.6. OVSS. Lung sounds clear, diminished in bases. No nausea. C/o abdominal pain rated 6/10 following loose stool x1. Received requested Benadryl and also Tylenol for platelet pre-med. Received platelets x1 with no issue. Good UOP - around 2L output overnight. No other replacements needed. Hourly rounding completed. Continue to monitor and follow plan of care.

## 2021-03-06 NOTE — PLAN OF CARE
Pt afebrile.  VSS and LS clear but diminished in bases.  Pt c/o abdominal pain, received scheduled morphine with relief.  Pt had 1x small emesis and received scheduled ativan with relief.  Pt had 1x watery stool.  Pt had minimal urinary output the majority of shift until evening.  Urine dark travis, MD aware.  Pt mother at bedside, attentive to pt and involved in cares.  Hourly rounding completed.  Continue with POC.

## 2021-03-06 NOTE — PROGRESS NOTES
Pediatric BMT Daily Progress Note    Interval Events: Artemio remains afebrile. Restarted oral Vancomycin secondary to increased complaints of abdominal pain described as cramping and increased frequency of loose watery stool (recent C.difficle infection). Continues with count recovery.     Review of Systems: Pertinent positives include those mentioned in interval events. A complete review of systems was performed and is otherwise negative.      Medications:  Please see MAR    Physical Exam:    Temp:  [98.4  F (36.9  C)-99.6  F (37.6  C)] 98.4  F (36.9  C)  Pulse:  [] 87  Resp:  [18-20] 18  BP: ()/(47-73) 99/58  SpO2:  [97 %-99 %] 98 %     I/O last 3 completed shifts:  In: 3798.48 [I.V.:1306.48]  Out: 3696 [Urine:3346; Stool:350]  GEN: Interactive, mom at bedside.   HEENT: Head NC/AT, PERRL, EOMs intact, sclerae clear, nares patent, MMM.   CARD: RRR, no murmur.  RESP: Lungs clear to auscultation bilaterally. Diminished in the bases.  Normal work of breathing.  ABD: Soft, NT, ND, bowel sounds present but slower no organomegaly or masses noted.   EXTREM: WWP, MAEE  SKIN: Dry skin; mild maculopapular rash near CVL dressing on L chest improved. Barbie-area not examined today.  NEURO: No focal deficits  ACCESS: Double lumen burns, port (not accessed)    Labs: All reviewed with pertinent positives: Hgb 9.8,Plts 12 k, BUN 20, Creat 0.60    Assessment and Plan: Artemio is a 19 yo male with history of multiply relapsed ALL now day +18 s/p UCBT following MAC per 2013-09. Afebrile and clinically stable. Counts recovering. Weaning morphine as able.     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 S/p preparative regimen per [id number] with Thiotepa (days -7  trough -6), Fludarabine (days -5 through -3), Busulfan (days -5 through -3), ATG (days -4 through -2) followed by UCBT (2/16).   - Awaiting count recovery, WBC 0.4 today  - Disease (BMBx) and donor evaluations due day +21     # Risk for GVHD:   - Continue Tacrolimus, goal 5-10.  Daily levels and will adjust his dose as needed.   - Continue MMF-- levels WNL    FEN/Renal:  # Risk for malnutrition: intake minimal  - Regular diet as tolerated  - Dietician to follow  - Continue TPN/IL (cycled to 12 hours)     # Electrolyte abnormalities:    - Check daily electrolytes   - Replace/adjust in TPN as indicated     # Risk for renal dysfunction and fluid overload: GFR (1/28): 119 mls/min  - Monitor I/O's and daily weights    # Risk for aHUS/TA-TMA:    - Monitor LDH qMon/Thurs: 119 (3/1)  - Monitor urine protein/creatinine qTuesday: 0.29 (3/2)     Pulmonary:  # Risk for pulmonary insufficiency: No current concerns    Cardiovascular:  # Asymptomatic WPW syndrome (diagnosed 2018): no interventions to date. Most recent EKG (1/27). ECHO (1/26) normal, EF 64%. 24h Zio patch holter (1/27-1/28) revealed c/w WPW with no ventricular ectopies present. Cardiology following. BNP elevated 664 (2/16) likely attributed to fluid shifts per cardiology. BNP threshold of concern is >5000.  - Obtain weekly (Tu) BNP (WNL at 79 on 3/2) and troponin (WNK at 0.018 on 3/2)  - Follow up ECHO in 6 mos (7/2021)     # Risk for hypertension secondary to medications: normotensive currently, monitoring     Heme:   # Pancytopenia secondary to chemotherapy  - Transfuse for hemoglobin < 8, platelet parameter 10K (decreased from 20K today given no recent epistaxis)  - Hgb parameter changed tto <8 g/dL due to concern for mild dizziness, pallor, and malaise with hgb in the 7's.   - Hx of transfusion reactions (hives): Premedicate with tylenol and benadryl prior to pRBCs and plts  - GCSF daily until ANC >/= 2500 x 3 consecutive days    # Recent hx of epistaxis:   -  Completed 3 day course of afrin (2/23-2/25) -- Continue PRN  - Decrease plt parameter to 10K given no recent epistaxis  - Platelet check daily     Infectious Disease:  # Fever in context of BMT conditioning and central access (2/13):  - Of note, also has un-accessed port-a-cath.   - Continue empiric Cefepime through count recovery    # C.Difficile colitis: Diagnosed 2/17  - 10d course of oral vancomycin complete 2/26  - Increase in frequency and volume of watery green stool  with lower abdominal cramping (3/4 to 3/5)  - Continue second 10 day course of oral vancomycin (3/5 -3/15)    # Risk for infection given immunocompromised status with need for prophylaxis:  - Viral (CMV/HSV sero positive): HD Acyclovir. Weekly CMV neg 3/3  - Fungal: Micafungin. Transition to either itraconazole or posaconazole when tolerating PO.  - Bacterial: Cefepime as above  - PCP: Pentamidine given 2/15. Consider Bactrim next month if WBC criteria met (no hx of adverse reaction).  - Hypogammaglobinemia: IgG 433 2/26, continue checking q2 weeks and replace if <400 (no hx replacement at [facility])      Past infections: C. Diff (November 2020)     GI:   # Reflux:  - BID protonix   - PRN Tums     # Nausea: s/p 3d course of emend x2 (2/16-2/18 and 2/21-2/23)   - Ativan q6h  - Benadryl prn     # Risk for VOD/elastography study participant: most recent abd US (2/22) revealed stable, mild hepatomegaly  - Ursodiol TID   - Labs and imaging per study        Neuro:  # Mucositis/abdominal pain:   - Tylenol PRN  - Mugard QID PRN- home medication  - Weaned Morphine to 1.0 mg q4H scheduled with 1 mg PRN q2H (3/6)  - Encourage warm packs     # History of significant spinal headaches: consider using Candelario needle with lumbar punctures    # CRS history. 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 No CRS, neurotoxicity or infectious complications from [place] CAR-T. Continue Q 2  weeks IGG levels due 3/9.     Derm:  # Barbie-area rash: nystatin ointment BID (started )    Fertility: Sperm collected previously collected for fertility preservation     Discharge Considerations: Expected lengths of hospitalization for patients undergoing stem cell transplantation vary by primary diagnosis, conditioning regimen, graft source, and development of complications. A typical stay is 6 weeks.    The above plan of care was developed by and communicated to me by the Pediatric BMT attending physician, Amisha Senior MD.    Lilia Prabhakar MSN, CPNP-  Pediatric Blood and Marrow Transplant Program  [facility] [phone]  Pager [phone]    BMT Attending Note:    Artemio was seen and evaluated by me today.     The significant interval history includes: Intermittent nausea/vomiting. Some abdominal pain. Loose stools continue.     I have reviewed changes and data from the last 24 hours, including medications, laboratory results and vital signs.     I have formulated and discussed the plan with the BMT team. I discussed the course and plan with the patient/family and answered all of their questions to the best of my ability. I counseled them regarding the followin y/o M w/ h/o multiply relapsed Pre-B cell ALL (s/p MSD BMT, CART-19, CART-22) in CR3 here for consolidation of remission with MAC (FluBuTT ATG) conditioning, now S/P UCBT, counts recovering, at risk for GVHD, at high risk for opportunistic infection, fever with ATG, C. difficile colitis, at risk for malnutrition, nausea, mucositis, at risk for VOD, at risk for gastritis, WPW, recent epistaxis and anticipatory guidance re: expected count recovery and other potential transplant related complications. Wean morphine today and follow pain control.       My care coordination activities today include oversight of planned lab studies, oversight of medication changes and discussion with BMT team-members.    My total floor time today was greater than  35 minutes, at least 50% of which was counseling and coordination of care.    Jaquelin Senior MD    Pediatric Blood and Marrow Transplant      Patient Active Problem List   Diagnosis     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)     Aaron-Parkinson-White (WPW) syndrome     Bone marrow transplant candidate     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)     At risk for infection     S/P allogeneic bone marrow transplant (H)     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     Examination of participant or control in clinical research

## 2021-03-07 LAB
ABO + RH BLD: NORMAL
ABO + RH BLD: NORMAL
ANION GAP SERPL CALCULATED.3IONS-SCNC: 6 MMOL/L (ref 3–14)
BASOPHILS # BLD AUTO: 0 10E9/L (ref 0–0.2)
BASOPHILS NFR BLD AUTO: 0 %
BLD GP AB SCN SERPL QL: NORMAL
BLD PROD TYP BPU: NORMAL
BLD PROD TYP BPU: NORMAL
BLD UNIT ID BPU: 0
BLOOD BANK CMNT PATIENT-IMP: NORMAL
BLOOD PRODUCT CODE: NORMAL
BPU ID: NORMAL
BUN SERPL-MCNC: 18 MG/DL (ref 7–30)
CALCIUM SERPL-MCNC: 8.2 MG/DL (ref 8.5–10.1)
CHLORIDE SERPL-SCNC: 109 MMOL/L (ref 94–109)
CO2 SERPL-SCNC: 21 MMOL/L (ref 20–32)
CREAT SERPL-MCNC: 0.57 MG/DL (ref 0.66–1.25)
DIFFERENTIAL METHOD BLD: ABNORMAL
EOSINOPHIL # BLD AUTO: 0 10E9/L (ref 0–0.7)
EOSINOPHIL NFR BLD AUTO: 1 %
ERYTHROCYTE [DISTWIDTH] IN BLOOD BY AUTOMATED COUNT: 10.9 % (ref 10–15)
GFR SERPL CREATININE-BSD FRML MDRD: >90 ML/MIN/{1.73_M2}
GLUCOSE SERPL-MCNC: 161 MG/DL (ref 70–99)
HCT VFR BLD AUTO: 26.6 % (ref 40–53)
HGB BLD-MCNC: 9.5 G/DL (ref 13.3–17.7)
LYMPHOCYTES # BLD AUTO: 0.1 10E9/L (ref 0.8–5.3)
LYMPHOCYTES NFR BLD AUTO: 14 %
MCH RBC QN AUTO: 30.1 PG (ref 26.5–33)
MCHC RBC AUTO-ENTMCNC: 35.7 G/DL (ref 31.5–36.5)
MCV RBC AUTO: 84 FL (ref 78–100)
MONOCYTES # BLD AUTO: 0.1 10E9/L (ref 0–1.3)
MONOCYTES NFR BLD AUTO: 25 %
NEUTROPHILS # BLD AUTO: 0.3 10E9/L (ref 1.6–8.3)
NEUTROPHILS NFR BLD AUTO: 60 %
NUM BPU REQUESTED: 1
OVALOCYTES BLD QL SMEAR: SLIGHT
PLATELET # BLD AUTO: 12 10E9/L (ref 150–450)
PLATELET # BLD EST: ABNORMAL 10*3/UL
POIKILOCYTOSIS BLD QL SMEAR: SLIGHT
POTASSIUM SERPL-SCNC: 3.9 MMOL/L (ref 3.4–5.3)
RBC # BLD AUTO: 3.16 10E12/L (ref 4.4–5.9)
SODIUM SERPL-SCNC: 136 MMOL/L (ref 133–144)
SPECIMEN EXP DATE BLD: NORMAL
TACROLIMUS BLD-MCNC: 10.6 UG/L (ref 5–15)
TME LAST DOSE: NORMAL H
TRANSFUSION STATUS PATIENT QL: NORMAL
TRANSFUSION STATUS PATIENT QL: NORMAL
TRIGL SERPL-MCNC: 63 MG/DL
WBC # BLD AUTO: 0.5 10E9/L (ref 4–11)

## 2021-03-07 PROCEDURE — 250N000011 HC RX IP 250 OP 636: Performed by: NURSE PRACTITIONER

## 2021-03-07 PROCEDURE — 250N000013 HC RX MED GY IP 250 OP 250 PS 637: Performed by: NURSE PRACTITIONER

## 2021-03-07 PROCEDURE — 80197 ASSAY OF TACROLIMUS: CPT | Performed by: NURSE PRACTITIONER

## 2021-03-07 PROCEDURE — 99233 SBSQ HOSP IP/OBS HIGH 50: CPT | Performed by: PEDIATRICS

## 2021-03-07 PROCEDURE — 250N000011 HC RX IP 250 OP 636: Performed by: PEDIATRICS

## 2021-03-07 PROCEDURE — 84478 ASSAY OF TRIGLYCERIDES: CPT | Performed by: PEDIATRICS

## 2021-03-07 PROCEDURE — 250N000013 HC RX MED GY IP 250 OP 250 PS 637: Performed by: PEDIATRICS

## 2021-03-07 PROCEDURE — 250N000009 HC RX 250: Performed by: PEDIATRICS

## 2021-03-07 PROCEDURE — 85025 COMPLETE CBC W/AUTO DIFF WBC: CPT | Performed by: NURSE PRACTITIONER

## 2021-03-07 PROCEDURE — 206N000001 HC R&B BMT UMMC

## 2021-03-07 PROCEDURE — 258N000003 HC RX IP 258 OP 636: Performed by: PEDIATRICS

## 2021-03-07 PROCEDURE — 86900 BLOOD TYPING SEROLOGIC ABO: CPT | Performed by: PEDIATRICS

## 2021-03-07 PROCEDURE — 250N000009 HC RX 250: Performed by: NURSE PRACTITIONER

## 2021-03-07 PROCEDURE — C9113 INJ PANTOPRAZOLE SODIUM, VIA: HCPCS | Performed by: NURSE PRACTITIONER

## 2021-03-07 PROCEDURE — 86850 RBC ANTIBODY SCREEN: CPT | Performed by: PEDIATRICS

## 2021-03-07 PROCEDURE — P9037 PLATE PHERES LEUKOREDU IRRAD: HCPCS | Performed by: NURSE PRACTITIONER

## 2021-03-07 PROCEDURE — 86901 BLOOD TYPING SEROLOGIC RH(D): CPT | Performed by: PEDIATRICS

## 2021-03-07 PROCEDURE — 80048 BASIC METABOLIC PNL TOTAL CA: CPT | Performed by: NURSE PRACTITIONER

## 2021-03-07 RX ORDER — MORPHINE SULFATE 2 MG/ML
0.75 INJECTION, SOLUTION INTRAMUSCULAR; INTRAVENOUS EVERY 4 HOURS
Status: DISCONTINUED | OUTPATIENT
Start: 2021-03-07 | End: 2021-03-09

## 2021-03-07 RX ADMIN — ACYCLOVIR SODIUM 600 MG: 50 INJECTION, SOLUTION INTRAVENOUS at 11:32

## 2021-03-07 RX ADMIN — LORAZEPAM 1 MG: 2 INJECTION INTRAMUSCULAR; INTRAVENOUS at 21:43

## 2021-03-07 RX ADMIN — VANCOMYCIN HYDROCHLORIDE 125 MG: 125 CAPSULE ORAL at 16:27

## 2021-03-07 RX ADMIN — DEXTROSE MONOHYDRATE 0.01 MG/KG/DAY: 5 INJECTION, SOLUTION INTRAVENOUS at 23:16

## 2021-03-07 RX ADMIN — I.V. FAT EMULSION 240 ML: 20 EMULSION INTRAVENOUS at 20:32

## 2021-03-07 RX ADMIN — ACYCLOVIR SODIUM 600 MG: 50 INJECTION, SOLUTION INTRAVENOUS at 02:37

## 2021-03-07 RX ADMIN — LORAZEPAM 1 MG: 2 INJECTION INTRAMUSCULAR; INTRAVENOUS at 05:15

## 2021-03-07 RX ADMIN — URSODIOL 600 MG: 300 CAPSULE ORAL at 14:15

## 2021-03-07 RX ADMIN — CEFEPIME 2 G: 2 INJECTION, POWDER, FOR SOLUTION INTRAVENOUS at 20:29

## 2021-03-07 RX ADMIN — MORPHINE SULFATE 1 MG: 2 INJECTION, SOLUTION INTRAMUSCULAR; INTRAVENOUS at 02:08

## 2021-03-07 RX ADMIN — MICAFUNGIN SODIUM 50 MG: 50 INJECTION, POWDER, LYOPHILIZED, FOR SOLUTION INTRAVENOUS at 10:09

## 2021-03-07 RX ADMIN — VANCOMYCIN HYDROCHLORIDE 125 MG: 125 CAPSULE ORAL at 08:05

## 2021-03-07 RX ADMIN — CEFEPIME 2 G: 2 INJECTION, POWDER, FOR SOLUTION INTRAVENOUS at 05:14

## 2021-03-07 RX ADMIN — Medication 315 MCG: at 20:26

## 2021-03-07 RX ADMIN — MYCOPHENOLATE MOFETIL 960 MG: 500 INJECTION, POWDER, LYOPHILIZED, FOR SOLUTION INTRAVENOUS at 06:16

## 2021-03-07 RX ADMIN — MORPHINE SULFATE 0.75 MG: 2 INJECTION, SOLUTION INTRAMUSCULAR; INTRAVENOUS at 17:47

## 2021-03-07 RX ADMIN — URSODIOL 600 MG: 300 CAPSULE ORAL at 20:29

## 2021-03-07 RX ADMIN — LIDOCAINE 1 PATCH: 560 PATCH PERCUTANEOUS; TOPICAL; TRANSDERMAL at 21:40

## 2021-03-07 RX ADMIN — ACYCLOVIR SODIUM 600 MG: 50 INJECTION, SOLUTION INTRAVENOUS at 19:14

## 2021-03-07 RX ADMIN — LORAZEPAM 1 MG: 2 INJECTION INTRAMUSCULAR; INTRAVENOUS at 10:07

## 2021-03-07 RX ADMIN — MYCOPHENOLATE MOFETIL 960 MG: 500 INJECTION, POWDER, LYOPHILIZED, FOR SOLUTION INTRAVENOUS at 21:46

## 2021-03-07 RX ADMIN — DIPHENHYDRAMINE HYDROCHLORIDE 50 MG: 50 INJECTION, SOLUTION INTRAMUSCULAR; INTRAVENOUS at 02:08

## 2021-03-07 RX ADMIN — MYCOPHENOLATE MOFETIL 960 MG: 500 INJECTION, POWDER, LYOPHILIZED, FOR SOLUTION INTRAVENOUS at 14:16

## 2021-03-07 RX ADMIN — SODIUM CHLORIDE: 234 INJECTION INTRAMUSCULAR; INTRAVENOUS; SUBCUTANEOUS at 20:29

## 2021-03-07 RX ADMIN — URSODIOL 600 MG: 300 CAPSULE ORAL at 08:04

## 2021-03-07 RX ADMIN — PANTOPRAZOLE SODIUM 40 MG: 40 INJECTION, POWDER, FOR SOLUTION INTRAVENOUS at 08:04

## 2021-03-07 RX ADMIN — MORPHINE SULFATE 0.75 MG: 2 INJECTION, SOLUTION INTRAMUSCULAR; INTRAVENOUS at 21:43

## 2021-03-07 RX ADMIN — CEFEPIME 2 G: 2 INJECTION, POWDER, FOR SOLUTION INTRAVENOUS at 13:19

## 2021-03-07 RX ADMIN — MORPHINE SULFATE 1 MG: 2 INJECTION, SOLUTION INTRAMUSCULAR; INTRAVENOUS at 06:16

## 2021-03-07 RX ADMIN — ACETAMINOPHEN 500 MG: 500 TABLET, FILM COATED ORAL at 02:09

## 2021-03-07 RX ADMIN — VANCOMYCIN HYDROCHLORIDE 125 MG: 125 CAPSULE ORAL at 20:29

## 2021-03-07 RX ADMIN — LORAZEPAM 1 MG: 2 INJECTION INTRAMUSCULAR; INTRAVENOUS at 16:27

## 2021-03-07 RX ADMIN — MORPHINE SULFATE 0.75 MG: 2 INJECTION, SOLUTION INTRAMUSCULAR; INTRAVENOUS at 14:15

## 2021-03-07 RX ADMIN — MORPHINE SULFATE 1 MG: 2 INJECTION, SOLUTION INTRAMUSCULAR; INTRAVENOUS at 10:07

## 2021-03-07 RX ADMIN — VANCOMYCIN HYDROCHLORIDE 125 MG: 125 CAPSULE ORAL at 12:05

## 2021-03-07 RX ADMIN — PANTOPRAZOLE SODIUM 40 MG: 40 INJECTION, POWDER, FOR SOLUTION INTRAVENOUS at 20:26

## 2021-03-07 ASSESSMENT — MIFFLIN-ST. JEOR: SCORE: 1615.37

## 2021-03-07 NOTE — PLAN OF CARE
Isael's t-max was 100.1. OVSS. Lung sounds clear on RA, diminished in bases. C/o sharp pain in left shoulder. MD notified and assessed. Pt reported that lidocaine patch not very effective in curbing pain. PRN Morphine given x1. Pt opted to sleep on right side with minimal movement of left arm through the night. PRN Benadryl and Tylenol given x1 for pre-meds. Platelets given x1 with no issue. No other replacements needed. No stool. Good UOP. Hourly rounding completed. Continue to monitor and follow plan of care.

## 2021-03-07 NOTE — PROGRESS NOTES
Pediatric BMT Daily Progress Note    Interval Events: Artemio remains afebrile, with continued count recovery. Left upper shoulder deltoid discomfort yesterday which escalated overnight to pain. Pain rated a 9/10, Artemio experiencing decreased range of motion with pain on palpation in one small area upper lateral left shoulder. Area of discomfort without erythema or ecchymosis. Lidocaine patch not significantly helpful, prn morphine beneficial. Worked to sleep on right arm.     Review of Systems: Pertinent positives include those mentioned in interval events. A complete review of systems was performed and is otherwise negative.      Medications:  Please see MAR    Physical Exam:    Temp:  [99  F (37.2  C)-100.1  F (37.8  C)] 99.3  F (37.4  C)  Pulse:  [68-97] 87  Resp:  [18-20] 20  BP: ()/(60-78) 100/66  Cuff Mean (mmHg):  [89] 89  SpO2:  [97 %-100 %] 97 %     I/O last 3 completed shifts:  In: 3967.38 [P.O.:30; I.V.:1442.88; Other:0.5]  Out: 3176 [Urine:3126; Emesis/NG output:50]  GEN: Interactive, mom at bedside.   HEENT: Head NC/AT, PERRL, EOMs intact, sclerae clear, nares patent, MMM.   CARD: RRR, no murmur.  RESP: Lungs clear to auscultation bilaterally. Diminished in the bases.  Normal work of breathing.  ABD: Soft, NT, ND, bowel sounds present but slower no organomegaly or masses noted.   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 Afebrile and clinically stable. Counts recovering with WBC 0.5 and ANC 0.3.  Weaning morphine as able. Left upper shoulder pain, appear muscular, pain is requiring prn morphine. Open to massage therapy and physical therapy. Wanting to try and  eat small amounts.     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 S/p preparative regimen per [id number] with Thiotepa (days -7 trough -6), Fludarabine (days -5 through -3), Busulfan (days -5 through -3), ATG (days -4 through -2) followed by UCBT (2/16).   - Awaiting count recovery, WBC 0.5 today  - Disease (BMBx) and donor evaluations due day +21     # Risk for GVHD:   - Continue Tacrolimus, goal 5-10.  Daily levels and will adjust his dose as needed.   - Continue MMF-- levels WNL    FEN/Renal:  # Risk for malnutrition: intake minimal  - Regular diet as tolerated  - Dietician to follow  - Continue TPN/IL (cycled to 12 hours)     # Electrolyte abnormalities:    - Check daily electrolytes   - Replace/adjust in TPN as indicated     # Risk for renal dysfunction and fluid overload: GFR (1/28): 119 mls/min  - Monitor I/O's and daily weights    # Risk for aHUS/TA-TMA:    - Monitor LDH qMon/Thurs: 119 (3/1)  - Monitor urine protein/creatinine qTuesday: 0.29 (3/2)     Pulmonary:  # Risk for pulmonary insufficiency: No current concerns    Cardiovascular:  # Asymptomatic WPW syndrome (diagnosed 2018): no interventions to date. Most recent EKG (1/27). ECHO (1/26) normal, EF 64%. 24h Zio patch holter (1/27-1/28) revealed c/w WPW with no ventricular ectopies present. Cardiology following. BNP elevated 664 (2/16) likely attributed to fluid shifts per cardiology. BNP threshold of concern is >5000.  - Obtain weekly (Tu) BNP (WNL at 79 on 3/2) and troponin (WNK at 0.018 on 3/2)  - Follow up ECHO in 6 mos (7/2021)     # Risk for hypertension secondary to medications: normotensive currently, monitoring     Heme:   # Pancytopenia secondary to chemotherapy  - Transfuse for hemoglobin  < 8, platelet parameter 10K   - Hgb parameter changed tto <8 g/dL due to concern for mild dizziness, pallor, and malaise with hgb in the 7's.   - Hx of transfusion reactions (hives): Premedicate with tylenol and benadryl prior to pRBCs and plts  - GCSF daily until ANC >/= 2500 x 3 consecutive days    # Recent hx of epistaxis:   - Completed 3 day course of afrin (2/23-2/25) -- Continue PRN  - Decrease plt parameter to 10K (3/7)  - Platelet check daily     Infectious Disease:  # Fever in context of BMT conditioning and central access (2/13):  - Of note, also has un-accessed port-a-cath.   - Continue empiric Cefepime through count recovery    # C.Difficile colitis: Diagnosed 2/17  - 10d course of oral vancomycin complete 2/26  - Increase in frequency and volume of watery green stool  with lower abdominal cramping (3/4 to 3/5)  - Continue second 10 day course of oral vancomycin (3/5 -3/15)    # Risk for infection given immunocompromised status with need for prophylaxis:  - Viral (CMV/HSV sero positive): HD Acyclovir. Weekly CMV neg 3/3  - Fungal: Micafungin. Transition to either itraconazole or posaconazole when tolerating PO.  - Bacterial: Cefepime as above  - PCP: Pentamidine given 2/15. Consider Bactrim next month if WBC criteria met (no hx of adverse reaction).  - Hypogammaglobinemia: IgG 433 2/26, continue checking q2 weeks and replace if <400 (no hx replacement at [facility])      Past infections: C. Diff (November 2020)     GI:   # Reflux:  - BID protonix   - PRN Tums     # Nausea: s/p 3d course of emend x2 (2/16-2/18 and 2/21-2/23)   - Ativan q6h- will need to initiate wean as able  - Benadryl prn     # Risk for VOD/elastography study participant: most recent abd US (2/22) revealed stable, mild hepatomegaly  - Ursodiol TID   - Labs and imaging per study        Neuro:  # Mucositis/abdominal pain:   - Tylenol PRN  - Mugard QID PRN- home medication  - Weaned Morphine to 0.75  mg q4H scheduled with 1 mg PRN q2H (3/6)  -  Encourage warm packs    # Left lateral shoulder/deltoid pain: started 3/6  - Artemio thought he sleep on arm/shoulder, pain escalated overnight to 8/10. Utilized prn morphine with some relief   - Decreased range of motion, pain on palpation, without erythema or swelling, non radiating, denies tingling or numbness   - Open to massage and physical therapy (placed order for physical therapy)     # History of significant spinal headaches: consider using Candelario needle with lumbar punctures    # CRS history. 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 No CRS, neurotoxicity or infectious complications from [place] CAR-T. Continue Q 2 weeks IGG levels due 3/9.     Derm:  # Barbie-area rash: nystatin ointment BID (started )    Fertility: Sperm collected previously collected for fertility preservation     Discharge Considerations: Expected lengths of hospitalization for patients undergoing stem cell transplantation vary by primary diagnosis, conditioning regimen, graft source, and development of complications. A typical stay is 6 weeks.    The above plan of care was developed by and communicated to me by the Pediatric BMT attending physician, Amisha Senior MD.    Lilia Prabhakar MSN, CPNP-AC  Pediatric Blood and Marrow Transplant Program  [facility] [phone]  Pager [phone]    BMT Attending Note:    Artemio was seen and evaluated by me today.     The significant interval history includes: Increasing left should pain overnight, improved after morphine.      I have reviewed changes and data from the last 24 hours, including medications, laboratory results and vital signs.     I have formulated and discussed the plan with the BMT team. I discussed the course and plan with the patient/family and answered all of their questions to the best of my ability. I counseled them regarding the followin y/o M w/ h/o multiply relapsed Pre-B cell  ALL (s/p MSD BMT, CART-19, CART-22) in CR3 here for consolidation of remission with MAC (FluBuTT ATG) conditioning, now S/P UCBT, counts recovering, at risk for GVHD, at high risk for opportunistic infection, fever with ATG, C. difficile colitis, at risk for malnutrition, nausea, mucositis, at risk for VOD, at risk for gastritis, WPW, recent epistaxis, shoulder pain of unclear etiology and anticipatory guidance re: expected count recovery and other potential transplant related complications. Will order PT for should pain, which is likely musculoskeletal in nature. Despite the pain, Isael would like to continue to wean his scheduled morphine today.       My care coordination activities today include oversight of planned lab studies, oversight of medication changes and discussion with BMT team-members.    My total floor time today was greater than 35 minutes, at least 50% of which was counseling and coordination of care.    Jaquelin Senior MD    Pediatric Blood and Marrow Transplant      Patient Active Problem List   Diagnosis     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)     Aaron-Parkinson-White (WPW) syndrome     Bone marrow transplant candidate     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)     At risk for infection     S/P allogeneic bone marrow transplant (H)     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     Examination of participant or control in clinical research

## 2021-03-07 NOTE — PLAN OF CARE
Pt afebrile.  VSS and LS clear but diminished in bases.  Pt c/o back, upper pain in his left shoulder.  Morphine bump given and heat applied with some relief.  No indications of nausea.  No PO intake except for sips of water with meds.  Pt slept majority of shift, but currently up talking on phone and playing video games.  Pt encouraged to get out of bed and sit in chair, but declined this shift.  Hourly rounding completed.  Continue with POC.

## 2021-03-07 NOTE — PLAN OF CARE
Pt afebrile.  VSS and lung sounds are clear, but diminished at bases.  Pt c/o left shoulder/upper arm pain with limited ROM.  Scheduled morphine decreased per pt request.  Lidocaine patch was in use, pt denied relief and patch was removed.  Pt denied nausea.  Pt ate 1 cup of applesauce.  Pt had 1x loose stool.  Pt in bed throughout shift, declined sitting in chair.  Hourly rounding completed.  Continue with POC.

## 2021-03-08 ENCOUNTER — HOSPITAL ENCOUNTER (OUTPATIENT)
Facility: CLINIC | Age: 21
End: 2021-03-08
Attending: NURSE PRACTITIONER
Payer: COMMERCIAL

## 2021-03-08 ENCOUNTER — APPOINTMENT (OUTPATIENT)
Dept: PHYSICAL THERAPY | Facility: CLINIC | Age: 21
DRG: 014 | End: 2021-03-08
Attending: PEDIATRICS
Payer: COMMERCIAL

## 2021-03-08 DIAGNOSIS — Z11.59 ENCOUNTER FOR SCREENING FOR OTHER VIRAL DISEASES: ICD-10-CM

## 2021-03-08 LAB
ALBUMIN SERPL-MCNC: 3.1 G/DL (ref 3.4–5)
ALP SERPL-CCNC: 87 U/L (ref 40–150)
ALT SERPL W P-5'-P-CCNC: 32 U/L (ref 0–70)
ANION GAP SERPL CALCULATED.3IONS-SCNC: 6 MMOL/L (ref 3–14)
APTT BLD: 40 SEC (ref 22–37)
APTT BLD: NORMAL SEC (ref 22–37)
APTT PPP: 52 SEC (ref 22–37)
APTT PPP: 54 SEC (ref 22–37)
AST SERPL W P-5'-P-CCNC: 22 U/L (ref 0–45)
BASOPHILS # BLD AUTO: 0 10E9/L (ref 0–0.2)
BASOPHILS NFR BLD AUTO: 0 %
BILIRUB DIRECT SERPL-MCNC: 0.5 MG/DL (ref 0–0.2)
BILIRUB SERPL-MCNC: 1 MG/DL (ref 0.2–1.3)
BLD PROD TYP BPU: NORMAL
BLD UNIT ID BPU: 0
BLD UNIT ID BPU: 0
BLOOD PRODUCT CODE: NORMAL
BLOOD PRODUCT CODE: NORMAL
BPU ID: NORMAL
BPU ID: NORMAL
BUN SERPL-MCNC: 20 MG/DL (ref 7–30)
CALCIUM SERPL-MCNC: 9.2 MG/DL (ref 8.5–10.1)
CHLORIDE SERPL-SCNC: 105 MMOL/L (ref 94–109)
CO2 SERPL-SCNC: 25 MMOL/L (ref 20–32)
CREAT SERPL-MCNC: 0.63 MG/DL (ref 0.66–1.25)
DIFFERENTIAL METHOD BLD: ABNORMAL
EOSINOPHIL # BLD AUTO: 0 10E9/L (ref 0–0.7)
EOSINOPHIL NFR BLD AUTO: 0.9 %
ERYTHROCYTE [DISTWIDTH] IN BLOOD BY AUTOMATED COUNT: 11 % (ref 10–15)
FIBRINOGEN PPP-MCNC: 578 MG/DL (ref 200–420)
GFR SERPL CREATININE-BSD FRML MDRD: >90 ML/MIN/{1.73_M2}
GLUCOSE SERPL-MCNC: 142 MG/DL (ref 70–99)
HCT VFR BLD AUTO: 26.6 % (ref 40–53)
HGB BLD-MCNC: 9.6 G/DL (ref 13.3–17.7)
INR PPP: 1.15 (ref 0.86–1.14)
LDH SERPL L TO P-CCNC: 142 U/L (ref 85–227)
LYMPHOCYTES # BLD AUTO: 0 10E9/L (ref 0.8–5.3)
LYMPHOCYTES NFR BLD AUTO: 4.3 %
MAGNESIUM SERPL-MCNC: 1.8 MG/DL (ref 1.6–2.3)
MCH RBC QN AUTO: 30.3 PG (ref 26.5–33)
MCHC RBC AUTO-ENTMCNC: 36.1 G/DL (ref 31.5–36.5)
MCV RBC AUTO: 84 FL (ref 78–100)
MONOCYTES # BLD AUTO: 0.3 10E9/L (ref 0–1.3)
MONOCYTES NFR BLD AUTO: 34.8 %
NEUTROPHILS # BLD AUTO: 0.5 10E9/L (ref 1.6–8.3)
NEUTROPHILS NFR BLD AUTO: 60 %
NUM BPU REQUESTED: 1
PHOSPHATE SERPL-MCNC: 3.1 MG/DL (ref 2.5–4.5)
PLATELET # BLD AUTO: 11 10E9/L (ref 150–450)
PLATELET # BLD EST: ABNORMAL 10*3/UL
POTASSIUM SERPL-SCNC: 4 MMOL/L (ref 3.4–5.3)
PREALB SERPL IA-MCNC: 17 MG/DL (ref 15–45)
PROT SERPL-MCNC: 6.6 G/DL (ref 6.8–8.8)
RBC # BLD AUTO: 3.17 10E12/L (ref 4.4–5.9)
RBC MORPH BLD: NORMAL
SODIUM SERPL-SCNC: 136 MMOL/L (ref 133–144)
TACROLIMUS BLD-MCNC: 7.5 UG/L (ref 5–15)
TME LAST DOSE: NORMAL H
TRANSFUSION STATUS PATIENT QL: NORMAL
WBC # BLD AUTO: 0.8 10E9/L (ref 4–11)

## 2021-03-08 PROCEDURE — 250N000009 HC RX 250: Performed by: PEDIATRICS

## 2021-03-08 PROCEDURE — 84100 ASSAY OF PHOSPHORUS: CPT | Performed by: NURSE PRACTITIONER

## 2021-03-08 PROCEDURE — 85730 THROMBOPLASTIN TIME PARTIAL: CPT | Performed by: NURSE PRACTITIONER

## 2021-03-08 PROCEDURE — 80197 ASSAY OF TACROLIMUS: CPT | Performed by: NURSE PRACTITIONER

## 2021-03-08 PROCEDURE — 250N000011 HC RX IP 250 OP 636: Performed by: PEDIATRICS

## 2021-03-08 PROCEDURE — 82248 BILIRUBIN DIRECT: CPT | Performed by: NURSE PRACTITIONER

## 2021-03-08 PROCEDURE — 258N000003 HC RX IP 258 OP 636: Performed by: PEDIATRICS

## 2021-03-08 PROCEDURE — 250N000011 HC RX IP 250 OP 636: Performed by: NURSE PRACTITIONER

## 2021-03-08 PROCEDURE — 250N000009 HC RX 250: Performed by: NURSE PRACTITIONER

## 2021-03-08 PROCEDURE — 97110 THERAPEUTIC EXERCISES: CPT | Mod: GP

## 2021-03-08 PROCEDURE — 87103 BLOOD FUNGUS CULTURE: CPT | Performed by: PEDIATRICS

## 2021-03-08 PROCEDURE — 85384 FIBRINOGEN ACTIVITY: CPT | Performed by: NURSE PRACTITIONER

## 2021-03-08 PROCEDURE — P9037 PLATE PHERES LEUKOREDU IRRAD: HCPCS | Performed by: NURSE PRACTITIONER

## 2021-03-08 PROCEDURE — 250N000013 HC RX MED GY IP 250 OP 250 PS 637: Performed by: NURSE PRACTITIONER

## 2021-03-08 PROCEDURE — 250N000013 HC RX MED GY IP 250 OP 250 PS 637: Performed by: PEDIATRICS

## 2021-03-08 PROCEDURE — 84134 ASSAY OF PREALBUMIN: CPT | Performed by: NURSE PRACTITIONER

## 2021-03-08 PROCEDURE — 80053 COMPREHEN METABOLIC PANEL: CPT | Performed by: NURSE PRACTITIONER

## 2021-03-08 PROCEDURE — 85610 PROTHROMBIN TIME: CPT | Performed by: NURSE PRACTITIONER

## 2021-03-08 PROCEDURE — 99233 SBSQ HOSP IP/OBS HIGH 50: CPT | Performed by: PEDIATRICS

## 2021-03-08 PROCEDURE — 258N000001 HC RX 258: Performed by: NURSE PRACTITIONER

## 2021-03-08 PROCEDURE — 83735 ASSAY OF MAGNESIUM: CPT | Performed by: NURSE PRACTITIONER

## 2021-03-08 PROCEDURE — 206N000001 HC R&B BMT UMMC

## 2021-03-08 PROCEDURE — 87040 BLOOD CULTURE FOR BACTERIA: CPT | Performed by: PEDIATRICS

## 2021-03-08 PROCEDURE — C9113 INJ PANTOPRAZOLE SODIUM, VIA: HCPCS | Performed by: NURSE PRACTITIONER

## 2021-03-08 PROCEDURE — 83615 LACTATE (LD) (LDH) ENZYME: CPT | Performed by: NURSE PRACTITIONER

## 2021-03-08 PROCEDURE — 85025 COMPLETE CBC W/AUTO DIFF WBC: CPT | Performed by: NURSE PRACTITIONER

## 2021-03-08 RX ORDER — LORAZEPAM 2 MG/ML
1 INJECTION INTRAMUSCULAR EVERY 8 HOURS
Status: DISCONTINUED | OUTPATIENT
Start: 2021-03-08 | End: 2021-03-10

## 2021-03-08 RX ADMIN — VANCOMYCIN HYDROCHLORIDE 125 MG: 125 CAPSULE ORAL at 08:06

## 2021-03-08 RX ADMIN — MICAFUNGIN SODIUM 50 MG: 50 INJECTION, POWDER, LYOPHILIZED, FOR SOLUTION INTRAVENOUS at 10:03

## 2021-03-08 RX ADMIN — MORPHINE SULFATE 0.75 MG: 2 INJECTION, SOLUTION INTRAMUSCULAR; INTRAVENOUS at 09:55

## 2021-03-08 RX ADMIN — MORPHINE SULFATE 0.75 MG: 2 INJECTION, SOLUTION INTRAMUSCULAR; INTRAVENOUS at 06:16

## 2021-03-08 RX ADMIN — DEXTROSE AND SODIUM CHLORIDE: 5; 450 INJECTION, SOLUTION INTRAVENOUS at 19:57

## 2021-03-08 RX ADMIN — I.V. FAT EMULSION 240 ML: 20 EMULSION INTRAVENOUS at 19:58

## 2021-03-08 RX ADMIN — Medication 315 MCG: at 20:15

## 2021-03-08 RX ADMIN — ACETAMINOPHEN 500 MG: 500 TABLET, FILM COATED ORAL at 02:40

## 2021-03-08 RX ADMIN — ACYCLOVIR SODIUM 600 MG: 50 INJECTION, SOLUTION INTRAVENOUS at 18:13

## 2021-03-08 RX ADMIN — DIPHENHYDRAMINE HYDROCHLORIDE 50 MG: 50 INJECTION, SOLUTION INTRAMUSCULAR; INTRAVENOUS at 15:27

## 2021-03-08 RX ADMIN — MORPHINE SULFATE 0.75 MG: 2 INJECTION, SOLUTION INTRAMUSCULAR; INTRAVENOUS at 18:13

## 2021-03-08 RX ADMIN — DEXTROSE MONOHYDRATE 0.01 MG/KG/DAY: 5 INJECTION, SOLUTION INTRAVENOUS at 19:58

## 2021-03-08 RX ADMIN — MORPHINE SULFATE 0.75 MG: 2 INJECTION, SOLUTION INTRAMUSCULAR; INTRAVENOUS at 14:05

## 2021-03-08 RX ADMIN — MORPHINE SULFATE 1 MG: 2 INJECTION, SOLUTION INTRAMUSCULAR; INTRAVENOUS at 04:19

## 2021-03-08 RX ADMIN — URSODIOL 600 MG: 300 CAPSULE ORAL at 19:57

## 2021-03-08 RX ADMIN — CEFEPIME 2 G: 2 INJECTION, POWDER, FOR SOLUTION INTRAVENOUS at 05:25

## 2021-03-08 RX ADMIN — MYCOPHENOLATE MOFETIL 960 MG: 500 INJECTION, POWDER, LYOPHILIZED, FOR SOLUTION INTRAVENOUS at 06:18

## 2021-03-08 RX ADMIN — VANCOMYCIN HYDROCHLORIDE 125 MG: 125 CAPSULE ORAL at 15:54

## 2021-03-08 RX ADMIN — MYCOPHENOLATE MOFETIL 960 MG: 500 INJECTION, POWDER, LYOPHILIZED, FOR SOLUTION INTRAVENOUS at 14:05

## 2021-03-08 RX ADMIN — LORAZEPAM 1 MG: 2 INJECTION INTRAMUSCULAR; INTRAVENOUS at 16:38

## 2021-03-08 RX ADMIN — HEPARIN 5 ML: 100 SYRINGE at 15:27

## 2021-03-08 RX ADMIN — LORAZEPAM 1 MG: 2 INJECTION INTRAMUSCULAR; INTRAVENOUS at 09:55

## 2021-03-08 RX ADMIN — MORPHINE SULFATE 0.75 MG: 2 INJECTION, SOLUTION INTRAMUSCULAR; INTRAVENOUS at 01:24

## 2021-03-08 RX ADMIN — LIDOCAINE 1 PATCH: 560 PATCH PERCUTANEOUS; TOPICAL; TRANSDERMAL at 19:57

## 2021-03-08 RX ADMIN — SODIUM CHLORIDE: 234 INJECTION INTRAMUSCULAR; INTRAVENOUS; SUBCUTANEOUS at 19:57

## 2021-03-08 RX ADMIN — DIPHENHYDRAMINE HYDROCHLORIDE 50 MG: 50 INJECTION, SOLUTION INTRAMUSCULAR; INTRAVENOUS at 23:53

## 2021-03-08 RX ADMIN — PANTOPRAZOLE SODIUM 40 MG: 40 INJECTION, POWDER, FOR SOLUTION INTRAVENOUS at 08:06

## 2021-03-08 RX ADMIN — URSODIOL 600 MG: 300 CAPSULE ORAL at 08:06

## 2021-03-08 RX ADMIN — URSODIOL 600 MG: 300 CAPSULE ORAL at 14:05

## 2021-03-08 RX ADMIN — DIPHENHYDRAMINE HYDROCHLORIDE 50 MG: 50 INJECTION, SOLUTION INTRAMUSCULAR; INTRAVENOUS at 02:40

## 2021-03-08 RX ADMIN — CEFEPIME 2 G: 2 INJECTION, POWDER, FOR SOLUTION INTRAVENOUS at 20:51

## 2021-03-08 RX ADMIN — VANCOMYCIN HYDROCHLORIDE 125 MG: 125 CAPSULE ORAL at 12:09

## 2021-03-08 RX ADMIN — MYCOPHENOLATE MOFETIL 960 MG: 500 INJECTION, POWDER, LYOPHILIZED, FOR SOLUTION INTRAVENOUS at 22:07

## 2021-03-08 RX ADMIN — LORAZEPAM 1 MG: 2 INJECTION INTRAMUSCULAR; INTRAVENOUS at 04:42

## 2021-03-08 RX ADMIN — ACYCLOVIR SODIUM 600 MG: 50 INJECTION, SOLUTION INTRAVENOUS at 04:22

## 2021-03-08 RX ADMIN — PANTOPRAZOLE SODIUM 40 MG: 40 INJECTION, POWDER, FOR SOLUTION INTRAVENOUS at 19:57

## 2021-03-08 RX ADMIN — CEFEPIME 2 G: 2 INJECTION, POWDER, FOR SOLUTION INTRAVENOUS at 12:08

## 2021-03-08 RX ADMIN — ACYCLOVIR SODIUM 600 MG: 50 INJECTION, SOLUTION INTRAVENOUS at 10:03

## 2021-03-08 RX ADMIN — MORPHINE SULFATE 0.75 MG: 2 INJECTION, SOLUTION INTRAMUSCULAR; INTRAVENOUS at 22:07

## 2021-03-08 RX ADMIN — VANCOMYCIN HYDROCHLORIDE 125 MG: 125 CAPSULE ORAL at 19:56

## 2021-03-08 ASSESSMENT — MIFFLIN-ST. JEOR: SCORE: 1634.37

## 2021-03-08 NOTE — PLAN OF CARE
Pt febrile, Tmax 101.2. Cultures drawn and charge notified. OVSS overnight. Lungs clear on RA, O2 sats %. Pt c/o pain in L shoulder which worsened to 8/10 pain; PRN morphine given with relief. No c/o pain otherwise. No N/V. Good UO, no stool. Plt 11, 1 dose plt given with acetaminophen and benadryl premeds; tolerated well. Phos 3.1; charge notified; other labs WDL. Continues on tacro gtt. Pt appropriate, slept soundly overnight. Hourly rounding completed, continue with POC.

## 2021-03-08 NOTE — PROGRESS NOTES
Pediatric BMT Daily Progress Note    Interval Events: Febrile this AM, tmax 101.1, hemodynamically stable. Blood cultures drawn, continues on empiric Cefepime. Shoulder pain improved last evening, then back to 8/10 overnight but better this morning. He has been eating bites with tolerance.    Review of Systems: Pertinent positives include those mentioned in interval events. A complete review of systems was performed and is otherwise negative.      Medications:  Please see MAR    Physical Exam:    Temp:  [98.6  F (37  C)-101.1  F (38.4  C)] 98.7  F (37.1  C)  Pulse:  [] 81  Resp:  [20-24] 20  BP: ()/(57-74) 99/57  Cuff Mean (mmHg):  [81-87] 81  SpO2:  [97 %-100 %] 98 %     I/O last 3 completed shifts:  In: 3670.19 [I.V.:1462.19]  Out: 1961 [Urine:1836; Stool:125]     GEN: Interactive, pleasant, NAD. Mom at bedside.   HEENT: Head NC/AT, PERRL, EOMs intact, sclerae clear, nares patent, MMM.   CARD: RRR, no murmur.  RESP: Lungs clear to auscultation bilaterally. Diminished in the bases.  Normal work of breathing.  ABD: Soft, NT, ND, bowel sounds present but slower no organomegaly or masses noted.   EXTREM: WWP, MAEE  SKIN: Dry skin, without rashes or bruising   NEURO: No focal deficits  ACCESS: Double lumen burns, port (not accessed)    Labs: All reviewed with pertinent positives: WBC 0.8, and ANC 0.5, Hgb 9.6, Plts 11k, BUN 20, Creat 0.63    Assessment and Plan: Artemio is a 19 yo male with history of multiply relapsed ALL now day +20 s/p UCBT following MAC per 2013-09. Febrile this AM for which cultures were drawn, remains on empiric Cefepime. Counts recovering. Continues with shoulder pain.     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 S/p  preparative regimen per [id number] with Thiotepa (days -7 trough -6), Fludarabine (days -5 through -3), Busulfan (days -5 through -3), ATG (days -4 through -2) followed by UCBT (2/16).   - Counts recovering, WBC 0.8 (ANC 0.5) today  - Disease (BMBx) and donor evaluations due day +21-- scheduled for Thursday.     # Risk for GVHD:   - Continue Tacrolimus, goal 5-10. Daily levels and will adjust his dose as needed.   - Continue MMF-- levels WNL    FEN/Renal:  # Risk for malnutrition: intake minimal  - Regular diet as tolerated  - Dietician to follow  - Continue TPN/IL (cycled to 12 hours)     # Electrolyte abnormalities:    - Check daily electrolytes   - Replace/adjust in TPN as indicated     # Risk for renal dysfunction and fluid overload: GFR (1/28): 119 mls/min  - Monitor I/O's and daily weights    # Risk for aHUS/TA-TMA:    - Monitor LDH qMon/Thurs: 142 (3/8)  - Monitor urine protein/creatinine qTuesday: 0.29 (3/2)     Pulmonary:  # Risk for pulmonary insufficiency: No current concerns    Cardiovascular:  # Asymptomatic WPW syndrome (diagnosed 2018): no interventions to date. Most recent EKG (1/27). ECHO (1/26) normal, EF 64%. 24h Zio patch holter (1/27-1/28) revealed c/w WPW with no ventricular ectopies present. Cardiology following. BNP elevated 664 (2/16) likely attributed to fluid shifts per cardiology. BNP threshold of concern is >5000.  - Obtain weekly (Tu) BNP (WNL at 79 on 3/2) and troponin (WNK at 0.018 on 3/2)  - Follow up ECHO in 6 mos (7/2021)     # Risk for hypertension secondary to medications: normotensive currently, monitoring     Heme:   # Pancytopenia secondary to chemotherapy  - Transfuse for hemoglobin < 8 (experienced mild dizziness and malaise when hgb in 7s); decrease platelet parameter from 20 to 10K given no recent epistaxis  - Hx of transfusion reactions (hives): Premedicate with tylenol and benadryl prior to pRBCs and plts  - GCSF daily until ANC >/= 2500 x 3 consecutive days    # Recent  hx of epistaxis: Completed 3 day course of afrin (2/23-2/25)   - Continue Afrin PRN    Infectious Disease:  # Neutropenic fever: Tmax 101.1, hemodynamically stable  - Obtain blood cultures (due to access port, will also obtain cx)  - Continue empiric Cefepime through count recovery    # C.Difficile colitis (2/17): PO vanco (2/17-2/26), restarted due to increased frequency and volume of watery stool with abdominal cramping.   - Continue second 10d course of oral vanco (3/5-3/15)     # Risk for infection given immunocompromised status with need for prophylaxis:  - Viral (CMV/HSV sero positive): HD Acyclovir. Weekly CMV neg 3/3.  - Fungal: Micafungin. Transition to either itraconazole or posaconazole when tolerating PO.  - Bacterial: Cefepime as above  - PCP: Pentamidine given 2/15. Consider Bactrim next month if WBC criteria met (no hx of adverse reaction).  - Hypogammaglobinemia: IgG 433 2/26, continue checking q2 weeks and replace if <400 (no hx replacement at [facility]). Next IgG due 3/12 (ordered).     Past infections: C. Diff (November 2020)     GI:   # Reflux:  - BID protonix   - PRN Tums     # Nausea: s/p 3d course of emend x2 (2/16-2/18 and 2/21-2/23)   - Space ativan out from q6h to q8h  - Benadryl prn     # Risk for VOD/elastography study participant: most recent abd US (2/22) revealed stable, mild hepatomegaly  - Ursodiol TID   - Labs and imaging per study -- repeat PTT today (likely heparinized)    Neuro:  # Mucositis/abdominal pain:   - Tylenol PRN  - Mugard QID PRN- home medication  - Weaned Morphine to 0.75  mg q4H scheduled with 1 mg PRN q2H (3/6)  - Encourage warm packs    # Left lateral shoulder/deltoid pain (3/6): c/w MSK pain, decreased ROM, pain on palpation  - PT consulted, will visit today  - Lidocaine patch     # History of significant spinal headaches: consider using Candelario needle with lumbar punctures    # CRS history. His post CAR-T therapy was complicated by Grade III CRS necessitating  Tociluzimab x3, dopamine pressor support, and steroids. No CRS, neurotoxicity or infectious complications from [place] CAR-T.     Derm:  # Barbie-area rash: discontinue nystatin ointment (no longer utilizing)    Fertility: Sperm collected previously collected for fertility preservation     Discharge Considerations: Expected lengths of hospitalization for patients undergoing stem cell transplantation vary by primary diagnosis, conditioning regimen, graft source, and development of complications. A typical stay is 6 weeks.    The above plan of care was developed by and communicated to me by the Pediatric BMT attending physician, Amisha Senior MD.    BENJA Rose-[facility] Emergency Blood and Marrow Transplant  [facility] [place] [address]  [phone]  [phone]    BMT Attending Note:    Artemio was seen and evaluated by me today.     The significant interval history includes: Shoulder pain continues off and on, better this morning. Febrile overnight.      I have reviewed changes and data from the last 24 hours, including medications, laboratory results and vital signs.     I have formulated and discussed the plan with the BMT team. I discussed the course and plan with the patient/family and answered all of their questions to the best of my ability. I counseled them regarding the followin y/o M w/ h/o multiply relapsed Pre-B cell ALL (s/p MSD BMT, CART-19, CART-22) in CR3 here for consolidation of remission with MAC (FluBuTT ATG) conditioning, now S/P UCBT, counts recovering, at risk for GVHD, at high risk for opportunistic infection, fever, C. difficile colitis, at risk for malnutrition, nausea, mucositis, at risk for VOD, at risk for gastritis, WPW, recent epistaxis, shoulder pain of unclear etiology and anticipatory guidance re: expected count recovery and other potential transplant related complications. PT to see today  for should pain. Continue empiric cefepime while we monitor blood cultures. Will wean ativan today.     My care coordination activities today include oversight of planned lab studies, oversight of medication changes and discussion with BMT team-members.    My total floor time today was greater than 35 minutes, at least 50% of which was counseling and coordination of care.    Jaquelin Senior MD    Pediatric Blood and Marrow Transplant      Patient Active Problem List   Diagnosis     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)     Aaron-Parkinson-White (WPW) syndrome     Bone marrow transplant candidate     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)     At risk for infection     S/P allogeneic bone marrow transplant (H)     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     Examination of participant or control in clinical research

## 2021-03-08 NOTE — PLAN OF CARE
Afebrile, other VSS, lungs clear. Pt reported left shoulder pain, he says it has been improving through the day. No issues with nausea. He refused a shower this evening, said he does not like showers but prefers baths. Mother and father bedside throughout the evening. Hourly rounding completed.

## 2021-03-09 LAB
ALBUMIN SERPL-MCNC: 3 G/DL (ref 3.4–5)
ALP SERPL-CCNC: 84 U/L (ref 40–150)
ALT SERPL W P-5'-P-CCNC: 36 U/L (ref 0–70)
ANION GAP SERPL CALCULATED.3IONS-SCNC: 4 MMOL/L (ref 3–14)
AST SERPL W P-5'-P-CCNC: 26 U/L (ref 0–45)
AT III ACT/NOR PPP CHRO: 79 % (ref 85–135)
BILIRUB SERPL-MCNC: 0.9 MG/DL (ref 0.2–1.3)
BLD PROD TYP BPU: NORMAL
BLD PROD TYP BPU: NORMAL
BLD UNIT ID BPU: 0
BLOOD PRODUCT CODE: NORMAL
BPU ID: NORMAL
BUN SERPL-MCNC: 17 MG/DL (ref 7–30)
CALCIUM SERPL-MCNC: 8.7 MG/DL (ref 8.5–10.1)
CHLORIDE SERPL-SCNC: 105 MMOL/L (ref 94–109)
CMV DNA SPEC NAA+PROBE-ACNC: NORMAL [IU]/ML
CMV DNA SPEC NAA+PROBE-LOG#: NORMAL {LOG_IU}/ML
CO2 SERPL-SCNC: 26 MMOL/L (ref 20–32)
CREAT SERPL-MCNC: 0.6 MG/DL (ref 0.66–1.25)
CREAT UR-MCNC: 54 MG/DL
DIFFERENTIAL METHOD BLD: ABNORMAL
ERYTHROCYTE [DISTWIDTH] IN BLOOD BY AUTOMATED COUNT: 10.9 % (ref 10–15)
GFR SERPL CREATININE-BSD FRML MDRD: >90 ML/MIN/{1.73_M2}
GLUCOSE SERPL-MCNC: 156 MG/DL (ref 70–99)
HCT VFR BLD AUTO: 25 % (ref 40–53)
HGB BLD-MCNC: 8.7 G/DL (ref 13.3–17.7)
LYMPHOCYTES # BLD AUTO: 0.1 10E9/L (ref 0.8–5.3)
LYMPHOCYTES NFR BLD AUTO: 13 %
MCH RBC QN AUTO: 29.5 PG (ref 26.5–33)
MCHC RBC AUTO-ENTMCNC: 34.8 G/DL (ref 31.5–36.5)
MCV RBC AUTO: 85 FL (ref 78–100)
MONOCYTES # BLD AUTO: 0.3 10E9/L (ref 0–1.3)
MONOCYTES NFR BLD AUTO: 28 %
NEUTROPHILS # BLD AUTO: 0.5 10E9/L (ref 1.6–8.3)
NEUTROPHILS NFR BLD AUTO: 59 %
NRBC # BLD AUTO: 0 10*3/UL
NRBC BLD AUTO-RTO: 2 /100
NT-PROBNP SERPL-MCNC: 29 PG/ML (ref 0–450)
NUM BPU REQUESTED: 1
PLATELET # BLD AUTO: 9 10E9/L (ref 150–450)
POTASSIUM SERPL-SCNC: 4 MMOL/L (ref 3.4–5.3)
PROT C ACT/NOR PPP CHRO: 53 % (ref 70–170)
PROT SERPL-MCNC: 6.4 G/DL (ref 6.8–8.8)
PROT UR-MCNC: 0.24 G/L
PROT/CREAT 24H UR: 0.46 G/G CR (ref 0–0.2)
RBC # BLD AUTO: 2.95 10E12/L (ref 4.4–5.9)
SODIUM SERPL-SCNC: 135 MMOL/L (ref 133–144)
SPECIMEN SOURCE: NORMAL
TACROLIMUS BLD-MCNC: 5.2 UG/L (ref 5–15)
TME LAST DOSE: NORMAL H
TRANSFUSION STATUS PATIENT QL: NORMAL
TRANSFUSION STATUS PATIENT QL: NORMAL
TROPONIN I SERPL-MCNC: 0.02 UG/L (ref 0–0.04)
WBC # BLD AUTO: 0.9 10E9/L (ref 4–11)

## 2021-03-09 PROCEDURE — 85303 CLOT INHIBIT PROT C ACTIVITY: CPT | Performed by: PEDIATRICS

## 2021-03-09 PROCEDURE — 80053 COMPREHEN METABOLIC PANEL: CPT | Performed by: NURSE PRACTITIONER

## 2021-03-09 PROCEDURE — 250N000009 HC RX 250: Performed by: PEDIATRICS

## 2021-03-09 PROCEDURE — 258N000003 HC RX IP 258 OP 636: Performed by: PEDIATRICS

## 2021-03-09 PROCEDURE — 84484 ASSAY OF TROPONIN QUANT: CPT | Performed by: NURSE PRACTITIONER

## 2021-03-09 PROCEDURE — G0452 MOLECULAR PATHOLOGY INTERPR: HCPCS | Mod: 26 | Performed by: PATHOLOGY

## 2021-03-09 PROCEDURE — 250N000011 HC RX IP 250 OP 636: Performed by: NURSE PRACTITIONER

## 2021-03-09 PROCEDURE — 99233 SBSQ HOSP IP/OBS HIGH 50: CPT | Performed by: PEDIATRICS

## 2021-03-09 PROCEDURE — 250N000013 HC RX MED GY IP 250 OP 250 PS 637: Performed by: NURSE PRACTITIONER

## 2021-03-09 PROCEDURE — 83880 ASSAY OF NATRIURETIC PEPTIDE: CPT | Performed by: NURSE PRACTITIONER

## 2021-03-09 PROCEDURE — C9113 INJ PANTOPRAZOLE SODIUM, VIA: HCPCS | Performed by: NURSE PRACTITIONER

## 2021-03-09 PROCEDURE — 85245 CLOT FACTOR VIII VW RISTOCTN: CPT | Performed by: PEDIATRICS

## 2021-03-09 PROCEDURE — 81268 CHIMERISM ANAL W/CELL SELECT: CPT | Performed by: PEDIATRICS

## 2021-03-09 PROCEDURE — 250N000009 HC RX 250: Performed by: NURSE PRACTITIONER

## 2021-03-09 PROCEDURE — 85025 COMPLETE CBC W/AUTO DIFF WBC: CPT | Performed by: NURSE PRACTITIONER

## 2021-03-09 PROCEDURE — 250N000011 HC RX IP 250 OP 636: Performed by: PEDIATRICS

## 2021-03-09 PROCEDURE — 85300 ANTITHROMBIN III ACTIVITY: CPT | Performed by: PEDIATRICS

## 2021-03-09 PROCEDURE — 206N000001 HC R&B BMT UMMC

## 2021-03-09 PROCEDURE — 250N000012 HC RX MED GY IP 250 OP 636 PS 637: Performed by: NURSE PRACTITIONER

## 2021-03-09 PROCEDURE — 81268 CHIMERISM ANAL W/CELL SELECT: CPT | Performed by: NURSE PRACTITIONER

## 2021-03-09 PROCEDURE — 80197 ASSAY OF TACROLIMUS: CPT | Performed by: NURSE PRACTITIONER

## 2021-03-09 PROCEDURE — P9037 PLATE PHERES LEUKOREDU IRRAD: HCPCS | Performed by: NURSE PRACTITIONER

## 2021-03-09 PROCEDURE — 84156 ASSAY OF PROTEIN URINE: CPT | Performed by: PEDIATRICS

## 2021-03-09 RX ORDER — MORPHINE SULFATE 2 MG/ML
0.75 INJECTION, SOLUTION INTRAMUSCULAR; INTRAVENOUS EVERY 6 HOURS
Status: DISCONTINUED | OUTPATIENT
Start: 2021-03-09 | End: 2021-03-10

## 2021-03-09 RX ORDER — PANTOPRAZOLE SODIUM 40 MG/1
40 TABLET, DELAYED RELEASE ORAL 2 TIMES DAILY
Status: DISCONTINUED | OUTPATIENT
Start: 2021-03-09 | End: 2021-03-15 | Stop reason: HOSPADM

## 2021-03-09 RX ORDER — TACROLIMUS 1 MG/1
1 CAPSULE ORAL 2 TIMES DAILY
Status: DISCONTINUED | OUTPATIENT
Start: 2021-03-09 | End: 2021-03-15 | Stop reason: HOSPADM

## 2021-03-09 RX ADMIN — Medication 315 MCG: at 20:28

## 2021-03-09 RX ADMIN — MICAFUNGIN SODIUM 50 MG: 50 INJECTION, POWDER, LYOPHILIZED, FOR SOLUTION INTRAVENOUS at 10:09

## 2021-03-09 RX ADMIN — ACYCLOVIR SODIUM 600 MG: 50 INJECTION, SOLUTION INTRAVENOUS at 18:05

## 2021-03-09 RX ADMIN — MYCOPHENOLATE MOFETIL 960 MG: 500 INJECTION, POWDER, LYOPHILIZED, FOR SOLUTION INTRAVENOUS at 14:15

## 2021-03-09 RX ADMIN — VANCOMYCIN HYDROCHLORIDE 125 MG: 125 CAPSULE ORAL at 16:00

## 2021-03-09 RX ADMIN — CEFEPIME 2 G: 2 INJECTION, POWDER, FOR SOLUTION INTRAVENOUS at 20:42

## 2021-03-09 RX ADMIN — MYCOPHENOLATE MOFETIL 960 MG: 500 INJECTION, POWDER, LYOPHILIZED, FOR SOLUTION INTRAVENOUS at 22:11

## 2021-03-09 RX ADMIN — DIPHENHYDRAMINE HYDROCHLORIDE 50 MG: 50 INJECTION, SOLUTION INTRAMUSCULAR; INTRAVENOUS at 22:33

## 2021-03-09 RX ADMIN — URSODIOL 600 MG: 300 CAPSULE ORAL at 20:36

## 2021-03-09 RX ADMIN — SODIUM CHLORIDE: 234 INJECTION INTRAMUSCULAR; INTRAVENOUS; SUBCUTANEOUS at 20:33

## 2021-03-09 RX ADMIN — MYCOPHENOLATE MOFETIL 960 MG: 500 INJECTION, POWDER, LYOPHILIZED, FOR SOLUTION INTRAVENOUS at 05:11

## 2021-03-09 RX ADMIN — MORPHINE SULFATE 0.75 MG: 2 INJECTION, SOLUTION INTRAMUSCULAR; INTRAVENOUS at 05:10

## 2021-03-09 RX ADMIN — ACETAMINOPHEN 500 MG: 500 TABLET, FILM COATED ORAL at 01:15

## 2021-03-09 RX ADMIN — CEFEPIME 2 G: 2 INJECTION, POWDER, FOR SOLUTION INTRAVENOUS at 05:11

## 2021-03-09 RX ADMIN — MORPHINE SULFATE 0.75 MG: 2 INJECTION, SOLUTION INTRAMUSCULAR; INTRAVENOUS at 10:08

## 2021-03-09 RX ADMIN — ACYCLOVIR SODIUM 600 MG: 50 INJECTION, SOLUTION INTRAVENOUS at 10:09

## 2021-03-09 RX ADMIN — VANCOMYCIN HYDROCHLORIDE 125 MG: 125 CAPSULE ORAL at 07:42

## 2021-03-09 RX ADMIN — LORAZEPAM 1 MG: 2 INJECTION INTRAMUSCULAR; INTRAVENOUS at 01:14

## 2021-03-09 RX ADMIN — TACROLIMUS 1 MG: 1 CAPSULE ORAL at 20:36

## 2021-03-09 RX ADMIN — ACYCLOVIR SODIUM 600 MG: 50 INJECTION, SOLUTION INTRAVENOUS at 02:58

## 2021-03-09 RX ADMIN — URSODIOL 600 MG: 300 CAPSULE ORAL at 14:13

## 2021-03-09 RX ADMIN — LORAZEPAM 1 MG: 2 INJECTION INTRAMUSCULAR; INTRAVENOUS at 10:09

## 2021-03-09 RX ADMIN — URSODIOL 600 MG: 300 CAPSULE ORAL at 07:42

## 2021-03-09 RX ADMIN — MORPHINE SULFATE 0.75 MG: 2 INJECTION, SOLUTION INTRAMUSCULAR; INTRAVENOUS at 01:14

## 2021-03-09 RX ADMIN — MORPHINE SULFATE 0.75 MG: 2 INJECTION, SOLUTION INTRAMUSCULAR; INTRAVENOUS at 22:11

## 2021-03-09 RX ADMIN — PANTOPRAZOLE SODIUM 40 MG: 40 TABLET, DELAYED RELEASE ORAL at 20:36

## 2021-03-09 RX ADMIN — PANTOPRAZOLE SODIUM 40 MG: 40 INJECTION, POWDER, FOR SOLUTION INTRAVENOUS at 07:42

## 2021-03-09 RX ADMIN — LORAZEPAM 1 MG: 2 INJECTION INTRAMUSCULAR; INTRAVENOUS at 17:57

## 2021-03-09 RX ADMIN — VANCOMYCIN HYDROCHLORIDE 125 MG: 125 CAPSULE ORAL at 20:36

## 2021-03-09 RX ADMIN — I.V. FAT EMULSION 240 ML: 20 EMULSION INTRAVENOUS at 20:37

## 2021-03-09 RX ADMIN — VANCOMYCIN HYDROCHLORIDE 125 MG: 125 CAPSULE ORAL at 11:30

## 2021-03-09 RX ADMIN — MORPHINE SULFATE 0.75 MG: 2 INJECTION, SOLUTION INTRAMUSCULAR; INTRAVENOUS at 15:57

## 2021-03-09 RX ADMIN — CEFEPIME 2 G: 2 INJECTION, POWDER, FOR SOLUTION INTRAVENOUS at 14:15

## 2021-03-09 ASSESSMENT — MIFFLIN-ST. JEOR: SCORE: 1616.37

## 2021-03-09 NOTE — PLAN OF CARE
[id number]: Afebrile, VSS. Lung sounds clear, diminished throughout. No complaints of pain or nausea--1/2 lidocaine patch placed on left shoulder. Ate well for dinner. Good UOP, stooled x 1. Hourly rounding completed, will continue to monitor.

## 2021-03-09 NOTE — PROGRESS NOTES
Pediatric BMT Daily Progress Note    Interval Events: Afebrile >24 hours, clinically stable while counts continue to recover. Pain and nausea well controlled, benadryl taken x1 to help tolerate eating. Platelets given overnight.    Review of Systems: Pertinent positives include those mentioned in interval events. A complete review of systems was performed and is otherwise negative.      Medications:  Please see MAR    Physical Exam:    Temp:  [99.1  F (37.3  C)-100  F (37.8  C)] 99.1  F (37.3  C)  Pulse:  [74-94] 94  Resp:  [18-24] 20  BP: ()/(56-78) 104/56  SpO2:  [98 %-100 %] 99 %     I/O last 3 completed shifts:  In: 3884.16 [I.V.:908.16; Other:472]  Out: 2690 [Urine:2540; Stool:150]     GEN: Interactive, pleasant, NAD. Mom at bedside.   HEENT: Head NC/AT, PERRL, EOMs intact, sclerae clear, nares patent, MMM.   CARD: RRR, no murmur.  RESP: Lungs clear to auscultation bilaterally. Diminished in the bases. Normal work of breathing.  ABD: Soft, NT, ND, bowel sounds present but slower no organomegaly or masses noted.   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 Afebrile now over 24 hours, pain and nausea well controlled. Counts recovering.     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 S/p preparative regimen per MT2013-09 with Thiotepa (days -7 trough -6), Fludarabine (days -5 through -3), Busulfan (days -5 through -3), ATG  (days -4 through -2) followed by UCBT (2/16).   - Counts recovering, WBC 0.9 (ANC 0.5) today  - Day +21 peripheral donor studies pending from today (3/9). Bone marrow biopsy scheduled for Thursday.     # Risk for GVHD:   - Continue Tacrolimus, goal 5-10. Daily levels and will adjust his dose as needed-- transition to oral this evening.  - Continue MMF-- levels WNL    FEN/Renal:  # Risk for malnutrition: intake minimal  - Regular diet as tolerated  - Dietician to follow  - Continue TPN/IL (cycled to 12 hours)     # Electrolyte abnormalities:    - Check daily electrolytes   - Replace/adjust in TPN as indicated     # Risk for renal dysfunction and fluid overload: GFR (1/28): 119 mls/min  - Monitor I/O's and daily weights    # Risk for aHUS/TA-TMA:    - Monitor LDH qMon/Thurs: 142 (3/8)  - Monitor urine protein/creatinine qTuesday: 0.29 (3/2)     Pulmonary:  # Risk for pulmonary insufficiency: No current concerns    Cardiovascular:  # Asymptomatic WPW syndrome (diagnosed 2018): no interventions to date. Most recent EKG (1/27). ECHO (1/26) normal, EF 64%. 24h Zio patch holter (1/27-1/28) revealed c/w WPW with no ventricular ectopies present. Cardiology following. BNP elevated 664 (2/16) likely attributed to fluid shifts per cardiology. BNP threshold of concern is >5000.  - Obtain weekly (Tu) BNP (WNL at 29 today) and troponin (WNL at 0.023 today)  - Follow up ECHO in 6 mos (7/2021)     # Risk for hypertension secondary to medications: normotensive currently, monitoring     Heme:   # Pancytopenia secondary to chemotherapy  - Transfuse for hemoglobin < 8 (experienced mild dizziness and malaise when hgb in 7s); platelet < 10K given no recent epistaxis  - Hx of transfusion reactions (hives): Premedicate with tylenol and benadryl prior to pRBCs and plts  - GCSF daily until ANC >/= 2500 x 3 consecutive days    # Recent hx of epistaxis: Completed 3 day course of afrin (2/23-2/25)   - Continue Afrin PRN    Infectious Disease:  #  Neutropenic fever (3/8): afebrile >24h  - Follow blood cultures (3/8) from CVC and port-a-cath  - Continue empiric Cefepime through count recovery    # C.Difficile colitis (2/17): PO vanco (2/17-2/26), restarted due to increased frequency and volume of watery stool with abdominal cramping.   - Continue second 10d course of oral vanco (3/5-3/15)     # Risk for infection given immunocompromised status with need for prophylaxis:  - Viral (CMV/HSV sero positive): HD Acyclovir. Weekly CMV neg 3/3.  - Fungal: Micafungin. Transition to either itraconazole or posaconazole when tolerating PO.  - Bacterial: Cefepime as above  - PCP: Pentamidine given 2/15. Consider Bactrim next month if WBC criteria met (no hx of adverse reaction).  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 Next IgG due 3/20.     Past infections: C. Diff (November 2020)     GI:   # Reflux:  - BID protonix -- transition to oral  - PRN Tums     # Nausea: s/p 3d course of emend x2 (2/16-2/18 and 2/21-2/23)   - Ativan q8h, weaned 3/8  - Benadryl prn     # Risk for VOD/elastography study participant: most recent abd US (2/22) revealed stable, mild hepatomegaly. Removed from study per patient preference.  - Ursodiol TID     Neuro:  # Mucositis/abdominal pain:   - Tylenol PRN  - Mugard QID PRN- home medication  - Weaned morphine today, continue PRN  - Encourage warm packs    # Left lateral shoulder/deltoid pain (3/6): c/w MSK pain, decreased ROM, pain on palpation  - continue PT   - Lidocaine patch     # History of significant spinal headaches: consider using Candelario needle with lumbar punctures    # CRS history. 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 No CRS, neurotoxicity or infectious complications from [place] CAR-T.     Derm:  # Barbie-area rash: resolved, s/p nystatin ointment    Fertility: Sperm  collected previously collected for fertility preservation     Discharge Considerations: Expected lengths of hospitalization for patients undergoing stem cell transplantation vary by primary diagnosis, conditioning regimen, graft source, and development of complications. A typical stay is 6 weeks.    The above plan of care was developed by and communicated to me by the Pediatric BMT attending physician, Amisha Senior MD.    BENJA Rose-AC  [facility]  [address] [address]  [phone]  [phone]    BMT Attending Note:    Artemio was seen and evaluated by me today.     The significant interval history includes: No new issues. No fevers.       I have reviewed changes and data from the last 24 hours, including medications, laboratory results and vital signs.     I have formulated and discussed the plan with the BMT team. I discussed the course and plan with the patient/family and answered all of their questions to the best of my ability. I counseled them regarding the followin y/o M w/ h/o multiply relapsed Pre-B cell ALL (s/p MSD BMT, CART-19, CART-22) in CR3 here for consolidation of remission with MAC (FluBuTT ATG) conditioning, now S/P UCBT, engrafting, at risk for GVHD, at high risk for opportunistic infection, fever, C. difficile colitis, at risk for malnutrition, nausea, mucositis, at risk for VOD, at risk for gastritis, WPW, recent epistaxis, shoulder pain of unclear etiology and anticipatory guidance re: expected count recovery and other potential transplant related complications. Continue empiric cefepime while we monitor blood cultures and await neutrophil recovery. Will wean morphine today. Start transition to enteral medications.     My care coordination activities today include oversight of planned lab studies, oversight of medication changes and discussion with BMT  team-members.    My total floor time today was greater than 35 minutes, at least 50% of which was counseling and coordination of care.    Jaquelin Senior MD    Pediatric Blood and Marrow Transplant      Patient Active Problem List   Diagnosis     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)     Aaron-Parkinson-White (WPW) syndrome     Bone marrow transplant candidate     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)     At risk for infection     S/P allogeneic bone marrow transplant (H)     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     Examination of participant or control in clinical research

## 2021-03-09 NOTE — PLAN OF CARE
Afebrile. VSS on RA. LS clear. Patient not reporting shoulder pain, appeared to sleep well overnight. No nausea. Received platelets without issue. Good UOP, no BM. Hourly rounding complete. Continue with plan of care.

## 2021-03-10 ENCOUNTER — ANESTHESIA EVENT (OUTPATIENT)
Dept: PEDIATRICS | Facility: CLINIC | Age: 21
End: 2021-03-10

## 2021-03-10 LAB
ANION GAP SERPL CALCULATED.3IONS-SCNC: 8 MMOL/L (ref 3–14)
BASOPHILS # BLD AUTO: 0 10E9/L (ref 0–0.2)
BASOPHILS NFR BLD AUTO: 0 %
BLD PROD TYP BPU: NORMAL
BLD UNIT ID BPU: 0
BLOOD PRODUCT CODE: NORMAL
BPU ID: NORMAL
BUN SERPL-MCNC: 16 MG/DL (ref 7–30)
CALCIUM SERPL-MCNC: 8.9 MG/DL (ref 8.5–10.1)
CHLORIDE SERPL-SCNC: 107 MMOL/L (ref 94–109)
CMV DNA SPEC NAA+PROBE-ACNC: NORMAL [IU]/ML
CMV DNA SPEC NAA+PROBE-LOG#: NORMAL {LOG_IU}/ML
CO2 SERPL-SCNC: 23 MMOL/L (ref 20–32)
COPATH REPORT: NORMAL
CREAT SERPL-MCNC: 0.56 MG/DL (ref 0.66–1.25)
DIFFERENTIAL METHOD BLD: ABNORMAL
EOSINOPHIL # BLD AUTO: 0 10E9/L (ref 0–0.7)
EOSINOPHIL NFR BLD AUTO: 0 %
ERYTHROCYTE [DISTWIDTH] IN BLOOD BY AUTOMATED COUNT: 11 % (ref 10–15)
GFR SERPL CREATININE-BSD FRML MDRD: >90 ML/MIN/{1.73_M2}
GLUCOSE SERPL-MCNC: 131 MG/DL (ref 70–99)
HCT VFR BLD AUTO: 25.4 % (ref 40–53)
HGB BLD-MCNC: 8.9 G/DL (ref 13.3–17.7)
LABORATORY COMMENT REPORT: NORMAL
LYMPHOCYTES # BLD AUTO: 0.2 10E9/L (ref 0.8–5.3)
LYMPHOCYTES NFR BLD AUTO: 17.5 %
MAGNESIUM SERPL-MCNC: 1.7 MG/DL (ref 1.6–2.3)
MCH RBC QN AUTO: 30 PG (ref 26.5–33)
MCHC RBC AUTO-ENTMCNC: 35 G/DL (ref 31.5–36.5)
MCV RBC AUTO: 86 FL (ref 78–100)
MONOCYTES # BLD AUTO: 0.4 10E9/L (ref 0–1.3)
MONOCYTES NFR BLD AUTO: 29.8 %
NEUTROPHILS # BLD AUTO: 0.6 10E9/L (ref 1.6–8.3)
NEUTROPHILS NFR BLD AUTO: 52.7 %
PHOSPHATE SERPL-MCNC: 3.2 MG/DL (ref 2.5–4.5)
PLATELET # BLD AUTO: 9 10E9/L (ref 150–450)
PLATELET # BLD EST: ABNORMAL 10*3/UL
POTASSIUM SERPL-SCNC: 3.8 MMOL/L (ref 3.4–5.3)
RBC # BLD AUTO: 2.97 10E12/L (ref 4.4–5.9)
RBC MORPH BLD: NORMAL
SARS-COV-2 RNA RESP QL NAA+PROBE: NEGATIVE
SODIUM SERPL-SCNC: 138 MMOL/L (ref 133–144)
SPECIMEN SOURCE: NORMAL
SPECIMEN SOURCE: NORMAL
TRANSFUSION STATUS PATIENT QL: NORMAL
TRANSFUSION STATUS PATIENT QL: NORMAL
WBC # BLD AUTO: 1.2 10E9/L (ref 4–11)

## 2021-03-10 PROCEDURE — 250N000009 HC RX 250: Performed by: NURSE PRACTITIONER

## 2021-03-10 PROCEDURE — 250N000013 HC RX MED GY IP 250 OP 250 PS 637: Performed by: NURSE PRACTITIONER

## 2021-03-10 PROCEDURE — 86901 BLOOD TYPING SEROLOGIC RH(D): CPT | Performed by: PEDIATRICS

## 2021-03-10 PROCEDURE — 84100 ASSAY OF PHOSPHORUS: CPT | Performed by: NURSE PRACTITIONER

## 2021-03-10 PROCEDURE — 250N000011 HC RX IP 250 OP 636: Performed by: PEDIATRICS

## 2021-03-10 PROCEDURE — P9037 PLATE PHERES LEUKOREDU IRRAD: HCPCS | Performed by: NURSE PRACTITIONER

## 2021-03-10 PROCEDURE — 87635 SARS-COV-2 COVID-19 AMP PRB: CPT | Performed by: NURSE PRACTITIONER

## 2021-03-10 PROCEDURE — 85018 HEMOGLOBIN: CPT | Performed by: NURSE PRACTITIONER

## 2021-03-10 PROCEDURE — 85025 COMPLETE CBC W/AUTO DIFF WBC: CPT | Performed by: NURSE PRACTITIONER

## 2021-03-10 PROCEDURE — 81403 MOPATH PROCEDURE LEVEL 4: CPT | Performed by: NURSE PRACTITIONER

## 2021-03-10 PROCEDURE — 258N000003 HC RX IP 258 OP 636: Performed by: PEDIATRICS

## 2021-03-10 PROCEDURE — 83735 ASSAY OF MAGNESIUM: CPT | Performed by: NURSE PRACTITIONER

## 2021-03-10 PROCEDURE — 250N000009 HC RX 250: Performed by: PEDIATRICS

## 2021-03-10 PROCEDURE — 86850 RBC ANTIBODY SCREEN: CPT | Performed by: PEDIATRICS

## 2021-03-10 PROCEDURE — 99233 SBSQ HOSP IP/OBS HIGH 50: CPT | Performed by: PEDIATRICS

## 2021-03-10 PROCEDURE — 250N000011 HC RX IP 250 OP 636: Performed by: NURSE PRACTITIONER

## 2021-03-10 PROCEDURE — 86922 COMPATIBILITY TEST ANTIGLOB: CPT | Performed by: PEDIATRICS

## 2021-03-10 PROCEDURE — 81219 CALR GENE COM VARIANTS: CPT | Performed by: NURSE PRACTITIONER

## 2021-03-10 PROCEDURE — 81270 JAK2 GENE: CPT | Performed by: NURSE PRACTITIONER

## 2021-03-10 PROCEDURE — 86900 BLOOD TYPING SEROLOGIC ABO: CPT | Performed by: PEDIATRICS

## 2021-03-10 PROCEDURE — 250N000012 HC RX MED GY IP 250 OP 636 PS 637: Performed by: NURSE PRACTITIONER

## 2021-03-10 PROCEDURE — 206N000001 HC R&B BMT UMMC

## 2021-03-10 PROCEDURE — 87081 CULTURE SCREEN ONLY: CPT | Performed by: PEDIATRICS

## 2021-03-10 PROCEDURE — 80048 BASIC METABOLIC PNL TOTAL CA: CPT | Performed by: NURSE PRACTITIONER

## 2021-03-10 RX ORDER — MORPHINE SULFATE 2 MG/ML
0.75 INJECTION, SOLUTION INTRAMUSCULAR; INTRAVENOUS EVERY 8 HOURS
Status: DISCONTINUED | OUTPATIENT
Start: 2021-03-10 | End: 2021-03-11

## 2021-03-10 RX ORDER — LORAZEPAM 2 MG/ML
1 INJECTION INTRAMUSCULAR 2 TIMES DAILY
Status: DISCONTINUED | OUTPATIENT
Start: 2021-03-10 | End: 2021-03-12

## 2021-03-10 RX ORDER — VALACYCLOVIR HYDROCHLORIDE 500 MG/1
1000 TABLET, FILM COATED ORAL 3 TIMES DAILY
Status: DISCONTINUED | OUTPATIENT
Start: 2021-03-10 | End: 2021-03-15 | Stop reason: HOSPADM

## 2021-03-10 RX ADMIN — VANCOMYCIN HYDROCHLORIDE 125 MG: 125 CAPSULE ORAL at 11:40

## 2021-03-10 RX ADMIN — VANCOMYCIN HYDROCHLORIDE 125 MG: 125 CAPSULE ORAL at 19:55

## 2021-03-10 RX ADMIN — URSODIOL 600 MG: 300 CAPSULE ORAL at 13:55

## 2021-03-10 RX ADMIN — TACROLIMUS 1 MG: 1 CAPSULE ORAL at 07:56

## 2021-03-10 RX ADMIN — I.V. FAT EMULSION 240 ML: 20 EMULSION INTRAVENOUS at 19:56

## 2021-03-10 RX ADMIN — PANTOPRAZOLE SODIUM 40 MG: 40 TABLET, DELAYED RELEASE ORAL at 19:55

## 2021-03-10 RX ADMIN — VALACYCLOVIR HYDROCHLORIDE 1000 MG: 500 TABLET, FILM COATED ORAL at 19:55

## 2021-03-10 RX ADMIN — VANCOMYCIN HYDROCHLORIDE 125 MG: 125 CAPSULE ORAL at 07:56

## 2021-03-10 RX ADMIN — Medication 315 MCG: at 19:56

## 2021-03-10 RX ADMIN — ACYCLOVIR SODIUM 600 MG: 50 INJECTION, SOLUTION INTRAVENOUS at 02:25

## 2021-03-10 RX ADMIN — URSODIOL 600 MG: 300 CAPSULE ORAL at 07:54

## 2021-03-10 RX ADMIN — VANCOMYCIN HYDROCHLORIDE 125 MG: 125 CAPSULE ORAL at 15:50

## 2021-03-10 RX ADMIN — LORAZEPAM 1 MG: 2 INJECTION INTRAMUSCULAR; INTRAVENOUS at 19:56

## 2021-03-10 RX ADMIN — MYCOPHENOLATE MOFETIL 960 MG: 500 INJECTION, POWDER, LYOPHILIZED, FOR SOLUTION INTRAVENOUS at 13:58

## 2021-03-10 RX ADMIN — MICAFUNGIN SODIUM 50 MG: 50 INJECTION, POWDER, LYOPHILIZED, FOR SOLUTION INTRAVENOUS at 11:40

## 2021-03-10 RX ADMIN — CEFEPIME 2 G: 2 INJECTION, POWDER, FOR SOLUTION INTRAVENOUS at 05:15

## 2021-03-10 RX ADMIN — MORPHINE SULFATE 0.75 MG: 2 INJECTION, SOLUTION INTRAMUSCULAR; INTRAVENOUS at 04:19

## 2021-03-10 RX ADMIN — DIPHENHYDRAMINE HYDROCHLORIDE 50 MG: 50 INJECTION, SOLUTION INTRAMUSCULAR; INTRAVENOUS at 23:34

## 2021-03-10 RX ADMIN — URSODIOL 600 MG: 300 CAPSULE ORAL at 19:55

## 2021-03-10 RX ADMIN — MORPHINE SULFATE 0.75 MG: 2 INJECTION, SOLUTION INTRAMUSCULAR; INTRAVENOUS at 18:04

## 2021-03-10 RX ADMIN — ACETAMINOPHEN 500 MG: 500 TABLET, FILM COATED ORAL at 03:45

## 2021-03-10 RX ADMIN — MYCOPHENOLATE MOFETIL 960 MG: 500 INJECTION, POWDER, LYOPHILIZED, FOR SOLUTION INTRAVENOUS at 21:57

## 2021-03-10 RX ADMIN — TACROLIMUS 1 MG: 1 CAPSULE ORAL at 19:55

## 2021-03-10 RX ADMIN — MORPHINE SULFATE 0.75 MG: 2 INJECTION, SOLUTION INTRAMUSCULAR; INTRAVENOUS at 09:50

## 2021-03-10 RX ADMIN — LORAZEPAM 1 MG: 2 INJECTION INTRAMUSCULAR; INTRAVENOUS at 02:25

## 2021-03-10 RX ADMIN — PANTOPRAZOLE SODIUM 40 MG: 40 TABLET, DELAYED RELEASE ORAL at 07:54

## 2021-03-10 RX ADMIN — MYCOPHENOLATE MOFETIL 960 MG: 500 INJECTION, POWDER, LYOPHILIZED, FOR SOLUTION INTRAVENOUS at 06:15

## 2021-03-10 RX ADMIN — SODIUM CHLORIDE: 234 INJECTION INTRAMUSCULAR; INTRAVENOUS; SUBCUTANEOUS at 19:56

## 2021-03-10 RX ADMIN — VALACYCLOVIR HYDROCHLORIDE 1000 MG: 500 TABLET, FILM COATED ORAL at 13:55

## 2021-03-10 RX ADMIN — LORAZEPAM 1 MG: 2 INJECTION INTRAMUSCULAR; INTRAVENOUS at 09:50

## 2021-03-10 ASSESSMENT — MIFFLIN-ST. JEOR: SCORE: 1613.37

## 2021-03-10 NOTE — ANESTHESIA PREPROCEDURE EVALUATION
Anesthesia Pre-Procedure Evaluation    Patient: Artemio Nino   MRN: [id number] : 2000        Preoperative Diagnosis: ALL (acute lymphoblastic leukemia) (H) [C91.00]   Procedure : Procedure(s):  BIOPSY BONE MARROW     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)      History of blood transfusion      WPW (Aaron-Parkinson-White syndrome) 2018      Past Surgical History:   Procedure Laterality Date     BONE MARROW BIOPSY, BONE SPECIMEN, NEEDLE/TROCAR Right 2018    Procedure: bone marrow biopsy;  Surgeon: Jacqueline Fitzgerald NP;  [facility]      BONE MARROW BIOPSY, BONE SPECIMEN, NEEDLE/TROCAR N/A 2019    Procedure: BIOPSY BONE MARROW;  Surgeon: Lisa Workman NP;  [facility]: [facility]      BONE MARROW BIOPSY, BONE SPECIMEN, NEEDLE/TROCAR N/A 2019    Procedure: BIOPSY, BONE MARROW;  Surgeon: Lilia López APRN CNP;  [facility]     BONE MARROW BIOPSY, BONE SPECIMEN, NEEDLE/TROCAR N/A 2019    Procedure: Bone marrow biopsy;  Surgeon: Jacqueline Shin NP;  [facility]      BONE MARROW BIOPSY, BONE SPECIMEN, NEEDLE/TROCAR Right 6/10/2020    Procedure: BIOPSY, BONE MARROW;  Surgeon: Candido Han PA-C;  [facility]      BONE MARROW BIOPSY, BONE SPECIMEN, NEEDLE/TROCAR N/A 2020    Procedure: Bone marrow biopsy;  Surgeon: Jacqueline Shin NP;  [facility]      BONE MARROW BIOPSY, BONE SPECIMEN, NEEDLE/TROCAR N/A 2020    Procedure: BIOPSY, BONE MARROW;  Surgeon: Lilia López APRN CNP;  [facility]      ESOPHAGOSCOPY, GASTROSCOPY, DUODENOSCOPY (EGD), COMBINED N/A 2019    Procedure: Upper endoscopy with biopsy;  Surgeon: Magdalene Hobson MD;  [facility]      INSERT CATHETER VASCULAR ACCESS N/A 2020    Procedure: NON -APHERESIS Double lumen tunneled central burns line placement;  Surgeon: Pato Gómez PA-C;   [facility]      INSERT CATHETER VASCULAR ACCESS N/A 2/8/2021    Procedure: apheresis catheter placement;  Surgeon: Pato Gómez PA-C;  [facility]      INSERT CATHETER VASCULAR ACCESS APHERESIS CHILD N/A 6/10/2020    Procedure: Large Bore Double Lumen NON TUNNELED Apheresis Catheter placement;  Surgeon: Link Rios PA-C;  [facility]      INSERT CATHETER VASCULAR ACCESS DOUBLE LUMEN CHILD N/A 10/26/2018    Procedure: double lumen tunneled line placement;  Surgeon: Rex Valente MD;  [facility]      INSERT PORT VASCULAR ACCESS N/A 6/10/2020    Procedure: Port placement;  Surgeon: Link Rios PA-C;  [facility]      IR CHEST PORT PLACEMENT > 5 YRS OF AGE  6/10/2020     IR CVC NON TUNNEL LINE REMOVAL  6/12/2020     IR CVC NON TUNNEL PLACEMENT  6/10/2020     IR CVC TUNNEL PLACEMENT > 5 YRS OF AGE  7/20/2020     IR CVC TUNNEL PLACEMENT > 5 YRS OF AGE  2/8/2021     IR CVC TUNNEL REMOVAL LEFT  2/8/2019     IR CVC TUNNEL REMOVAL LEFT  8/24/2020     IR PORT REMOVAL LEFT  5/16/2019     O CLAVICLE LEFT Left     fracture with surgical repair and plate/screws     ORTHOPEDIC SURGERY      femur     REMOVE CATHETER VASCULAR ACCESS N/A 2/8/2019    Procedure: Tunneled line removal;  Surgeon: Krystal Esparza MD;  [facility]      REMOVE CATHETER VASCULAR ACCESS N/A 8/24/2020    Procedure: tunneled line removal;  Surgeon: Siri Hernandez PA-C;  [facility]      REMOVE PORT VASCULAR ACCESS N/A 5/16/2019    Procedure: REMOVAL, VASCULAR ACCESS PORT;  Surgeon: Link Rios PA-C;  [facility]      Allergies   Allergen Reactions     Blood Transfusion Related (Informational Only) Other (See Comments)     Patient has a history of a clinically significant antibody against RBC antigens.  A delay in compatible RBCs may occur.Stem cell transplant patient.  Give type O RBCs.  Requires  Benadryl and tylenol as premed for platelets and RBCs for hx of hives     Voriconazole Photosensitivity     Significant rash - do not rechallenge      Social History     Tobacco Use     Smoking status: Never Smoker     Smokeless tobacco: Never Used   Substance Use Topics     Alcohol use: Never     Frequency: Never      Wt Readings from Last 1 Encounters:   03/11/21 63.6 kg (140 lb 3.4 oz)        Anesthesia Evaluation   Pt has had prior anesthetic. Type: General.    No history of anesthetic complications       ROS/MED HX  ENT/Pulmonary:  - neg pulmonary ROS     Neurologic:  - neg neurologic ROS     Cardiovascular:  - neg cardiovascular ROS   (+) -----Previous cardiac testing   Echo: Date: 1/26/21 Results:  Patient after bone marrow transplant. Normal echocardiogram. The left and  right ventricles have normal chamber size, wall thickness, and systolic  function. The calculated biplane left ventricular ejection fraction is 64 %. A  catheter is seen with its tip in the right atrium. No pericardial effusion.  Technically difficult study due to lung artifact.  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 Now in deep remission, preparing for second BMT with [facility].  (+) Malignancy (ALL),     Other:  - neg other ROS          Physical Exam    Airway  airway exam normal           Respiratory Devices and Support         Dental  no notable dental history         Cardiovascular   cardiovascular exam normal       Rhythm and rate: regular and  normal     Pulmonary   pulmonary exam normal        breath sounds clear to auscultation           OUTSIDE LABS:  CBC:   Lab Results   Component Value Date    WBC 1.6 (L) 03/11/2021    WBC 1.2 (L) 03/10/2021    HGB 8.1 (L) 03/11/2021    HGB 8.9 (L) 03/10/2021    HCT 23.6 (L) 03/11/2021    HCT 25.4 (L) 03/10/2021    PLT 7 (LL) 03/11/2021    PLT 9 (LL) 03/10/2021     BMP:   Lab Results   Component Value Date     03/11/2021     03/10/2021    POTASSIUM 3.6 03/11/2021    POTASSIUM 3.8 03/10/2021    CHLORIDE 105 03/11/2021    CHLORIDE 107 03/10/2021    CO2 25 03/11/2021    CO2 23 03/10/2021    BUN 16 03/11/2021    BUN 16 03/10/2021    CR 0.52 (L) 03/11/2021    CR 0.56 (L) 03/10/2021     (H) 03/11/2021     (H) 03/10/2021     COAGS:   Lab Results   Component Value Date    PTT 52 (H) 03/08/2021    INR 1.15 (H) 03/08/2021    FIBR 578 (H) 03/08/2021     POC:   Lab Results   Component Value Date    BGM 98 08/01/2020     HEPATIC:   Lab Results   Component Value Date    ALBUMIN 3.1 (L) 03/11/2021    PROTTOTAL 6.5 (L) 03/11/2021    ALT 36 03/11/2021    AST 25 03/11/2021     (H) 08/11/2020    ALKPHOS 83 03/11/2021    BILITOTAL 0.7 03/11/2021     OTHER:   Lab Results   Component Value Date    ELSA 8.4 (L) 03/11/2021    PHOS 3.2 03/10/2021    MAG 1.7 03/10/2021    TSH 4.20 (H) 11/07/2019    T4 0.88 11/07/2019    CRP <2.9 08/24/2020       Anesthesia Plan    ASA Status:  3   NPO Status:  NPO Appropriate    Anesthesia Type: General.     - Airway: Native airway   Induction: Intravenous.   Maintenance: TIVA.        Consents    Anesthesia Plan(s) and associated risks, benefits, and realistic alternatives discussed. Questions answered and patient/representative(s) expressed understanding.     - Discussed with:  Patient      - Extended Intubation/Ventilatory Support Discussed: No.      - Patient is DNR/DNI Status: No    Use of blood products discussed: No .     Postoperative Care    Pain management: IV  analgesics.   PONV prophylaxis: Ondansetron (or other 5HT-3)     Comments:    Discussed common and potentially harmful risks for General Anesthesia, Native Airway.   These risks include, but were not limited to: Conversion to secured airway, Sore throat, Airway injury, Dental injury, Aspiration, Respiratory issues (Bronchospasm, Laryngospasm, Desaturation), Hemodynamic issues (Arrhythmia, Hypotension, Ischemia), Potential long term consequences of respiratory and hemodynamic issues, PONV, Emergence delirium  Risks of invasive procedures were not discussed: N/A    All questions were answered.            Con Patel MD

## 2021-03-10 NOTE — PROGRESS NOTES
Pediatric BMT Daily Progress Note    Interval Events: Afebrile >48h. Counts continue to rise; neutrophil recovery formally achieved day +20. Pain and nausea continue to be well controlled while weaning supportive medications.    Review of Systems: Pertinent positives include those mentioned in interval events. A complete review of systems was performed and is otherwise negative.      Medications:  Please see MAR    Physical Exam:    Temp:  [98.6  F (37  C)-99.5  F (37.5  C)] 99  F (37.2  C)  Pulse:  [] 90  Resp:  [20-24] 20  BP: ()/(55-71) 107/59  Cuff Mean (mmHg):  [72-82] 72  SpO2:  [95 %-99 %] 99 %     I/O last 3 completed shifts:  In: 3282.72 [I.V.:1163.72; Other:65]  Out: 2950 [Urine:2750; Stool:200]     GEN: Interactive, pleasant, NAD. Mom at bedside.   HEENT: Head NC/AT, PERRL, EOMs intact, sclerae clear, nares patent, MMM.   CARD: RRR, no murmur.  RESP: Lungs clear to auscultation bilaterally. Diminished in the bases. Normal work of breathing.  ABD: Soft, NT, ND, bowel sounds present but slower no organomegaly or masses noted.   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 Afebrile now >48h, pain and nausea well controlled. Counts recovering.     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 S/p preparative regimen per [id number] with Thiotepa (days -7 trough -6), Fludarabine (days -5 through -3), Busulfan (days -5  through -3), ATG (days -4 through -2) followed by UCBT (2/16). Neutrophil recovery acheived day +20.  - Day +21 peripheral donor studies pending from 3/9. Bone marrow biopsy scheduled for Thursday (no LP needed).     # Risk for GVHD:   - Continue Tacrolimus, goal 5-10. Level 5.2 yesterday, transitioned to oral last evening. Obtain level tomorrow AM.  - Continue MMF-- levels WNL    FEN/Renal:  # Risk for malnutrition: intake minimal  - Regular diet as tolerated  - Dietician to follow  - Continue TPN/IL (cycled to 12 hours)     # Electrolyte abnormalities:    - Check daily electrolytes   - Replace/adjust in TPN as indicated     # Risk for renal dysfunction and fluid overload: GFR (1/28): 119 mls/min  - Monitor I/O's and daily weights    # Risk for aHUS/TA-TMA:    - Monitor LDH qMon/Thurs: 142 (3/8)  - Monitor urine protein/creatinine qTuesday: 0.46 (3/9)     Pulmonary:  # Risk for pulmonary insufficiency: No current concerns    Cardiovascular:  # Asymptomatic WPW syndrome (diagnosed 2018): no interventions to date. Most recent EKG (1/27). ECHO (1/26) normal, EF 64%. 24h Zio patch holter (1/27-1/28) revealed c/w WPW with no ventricular ectopies present. Cardiology following. BNP elevated 664 (2/16) likely attributed to fluid shifts per cardiology. BNP threshold of concern is >5000.  - Obtain weekly (Tu) BNP (WNL at 29 on 3/9) and troponin (WNL at 0.023 on 3/9)  - Follow up ECHO in 6 mos (7/2021)     # Risk for hypertension secondary to medications: normotensive currently, monitoring     Heme:   # Pancytopenia secondary to chemotherapy  - Transfuse for hemoglobin < 8 (experienced mild dizziness and malaise when hgb in 7s); platelet < 10K  - Hx of transfusion reactions (hives): Premedicate with tylenol and benadryl prior to pRBCs and plts  - GCSF daily until ANC >/= 2500 x 3 consecutive days    # Recent hx of epistaxis: Completed 3 day course of afrin (2/23-2/25), plt transfusion parameter previously <20k.   - Continue  Afrin PRN    Infectious Disease:  # Neutropenic fever (3/8): afebrile >48h  - Follow blood cultures (3/8) from CVC and port-a-cath: NGTD  - Discontinue empiric Cefepime today; restart if febrile    # C.Difficile colitis (2/17): PO vanco (2/17-2/26), restarted due to increased frequency and volume of watery stool with abdominal cramping.   - Continue second 10d course of oral vanco (3/5-3/15)     # Risk for infection given immunocompromised status with need for prophylaxis:  - Viral (CMV/HSV sero positive): HD Acyclovir. Weekly CMV neg 3/9.  - Fungal: Micafungin. Consider transition to Itraconazole tomorrow.  - PCP: Pentamidine given 2/15. Consider Bactrim next month if WBC criteria met (no hx of adverse reaction).  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 Next IgG due 3/20.     Past infections: C. Diff (November 2020)     GI:   # Reflux:  - BID protonix   - PRN Tums     # Nausea: s/p 3d course of emend x2 (2/16-2/18 and 2/21-2/23)   - Wean Ativan from q8h to BID  - Benadryl prn     # Risk for VOD: most recent abd US (2/22) revealed stable, mild hepatomegaly (previously enrolled in elastography study)  - Ursodiol TID     Neuro:  # Mucositis/abdominal pain:   - Tylenol PRN  - Mugard QID PRN- home medication  - Wean morphine today, continue PRN  - Encourage warm packs    # Left lateral shoulder/deltoid pain (3/6): c/w MSK pain, decreased ROM, pain on palpation  - continue PT   - Lidocaine patch     # History of significant spinal headaches: consider using Candelario needle with lumbar punctures    # CRS history. 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 No CRS, neurotoxicity or infectious complications from [place] CAR-T.     Derm:  # Barbie-area rash: resolved, s/p nystatin ointment    Fertility: Sperm collected previously collected for fertility  preservation     Discharge Considerations: Expected lengths of hospitalization for patients undergoing stem cell transplantation vary by primary diagnosis, conditioning regimen, graft source, and development of complications. A typical stay is 6 weeks.    The above plan of care was developed by and communicated to me by the Pediatric BMT attending physician, Amisha Senior MD.    BENJA Rose-AC  [facility] Marrow Transplant  [id number] [place] [address]  [phone]  [phone]    BMT Attending Note:    Artemio was seen and evaluated by me today.     The significant interval history includes: No new issues. Shoulder pain improving. Minimal nausea.      I have reviewed changes and data from the last 24 hours, including medications, laboratory results and vital signs.     I have formulated and discussed the plan with the BMT team. I discussed the course and plan with the patient/family and answered all of their questions to the best of my ability. I counseled them regarding the followin y/o M w/ h/o multiply relapsed Pre-B cell ALL (s/p MSD BMT, CART-19, CART-22) in CR3 here for consolidation of remission with MAC (FluBuTT ATG) conditioning, now S/P UCBT, engrafted, at risk for GVHD, at high risk for opportunistic infection, C. difficile colitis, at risk for malnutrition, nausea, resolving mucositis pain, at risk for VOD, at risk for gastritis, WPW, shoulder pain of unclear etiology and anticipatory guidance re: other potential transplant related complications and anticipated discharge. Will stop cefepime today and follow for fevers. Will wean morphine and ativan and continue transition to oral meds. Bone marrow biopsy scheduled for tomorrow. Likely discharge  or Monday if things continue to go well.      My care coordination activities today include oversight of planned lab studies, oversight of  medication changes and discussion with BMT team-members.    My total floor time today was greater than 35 minutes, at least 50% of which was counseling and coordination of care.    Jaquelin Senior MD    Pediatric Blood and Marrow Transplant      Patient Active Problem List   Diagnosis     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)     Aaron-Parkinson-White (WPW) syndrome     Bone marrow transplant candidate     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)     At risk for infection     S/P allogeneic bone marrow transplant (H)     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     Examination of participant or control in clinical research

## 2021-03-10 NOTE — PLAN OF CARE
Pt was afebrile, tmax 99.5. OVSS. Lung sounds clear, sats well on RA. No pain or n/v expressed. PRN Benadryl given x1 per pt request. Good UO, no stool. Platelets replaced, Tylenol and Benadryl premeds given. No family at bedside, hourly rounding completed, continue POC.

## 2021-03-11 ENCOUNTER — ANESTHESIA (OUTPATIENT)
Dept: PEDIATRICS | Facility: CLINIC | Age: 21
End: 2021-03-11
Payer: COMMERCIAL

## 2021-03-11 LAB
ALBUMIN SERPL-MCNC: 3.1 G/DL (ref 3.4–5)
ALP SERPL-CCNC: 83 U/L (ref 40–150)
ALT SERPL W P-5'-P-CCNC: 36 U/L (ref 0–70)
ANION GAP SERPL CALCULATED.3IONS-SCNC: 6 MMOL/L (ref 3–14)
AST SERPL W P-5'-P-CCNC: 25 U/L (ref 0–45)
BASOPHILS # BLD AUTO: 0 10E9/L (ref 0–0.2)
BASOPHILS NFR BLD AUTO: 0 %
BILIRUB SERPL-MCNC: 0.7 MG/DL (ref 0.2–1.3)
BLD PROD TYP BPU: NORMAL
BLD UNIT ID BPU: 0
BLD UNIT ID BPU: 0
BLOOD PRODUCT CODE: NORMAL
BLOOD PRODUCT CODE: NORMAL
BPU ID: NORMAL
BPU ID: NORMAL
BUN SERPL-MCNC: 16 MG/DL (ref 7–30)
CALCIUM SERPL-MCNC: 8.4 MG/DL (ref 8.5–10.1)
CHLORIDE SERPL-SCNC: 105 MMOL/L (ref 94–109)
CO2 SERPL-SCNC: 25 MMOL/L (ref 20–32)
COPATH REPORT: NORMAL
CREAT SERPL-MCNC: 0.52 MG/DL (ref 0.66–1.25)
DIFFERENTIAL METHOD BLD: ABNORMAL
EOSINOPHIL # BLD AUTO: 0 10E9/L (ref 0–0.7)
EOSINOPHIL NFR BLD AUTO: 0 %
ERYTHROCYTE [DISTWIDTH] IN BLOOD BY AUTOMATED COUNT: 11.2 % (ref 10–15)
GFR SERPL CREATININE-BSD FRML MDRD: >90 ML/MIN/{1.73_M2}
GLUCOSE SERPL-MCNC: 173 MG/DL (ref 70–99)
HCT VFR BLD AUTO: 23.6 % (ref 40–53)
HGB BLD-MCNC: 7.8 G/DL (ref 13.3–17.7)
HGB BLD-MCNC: 8.1 G/DL (ref 13.3–17.7)
LDH SERPL L TO P-CCNC: 155 U/L (ref 85–227)
LYMPHOCYTES # BLD AUTO: 0.4 10E9/L (ref 0.8–5.3)
LYMPHOCYTES NFR BLD AUTO: 23.7 %
MCH RBC QN AUTO: 29.3 PG (ref 26.5–33)
MCHC RBC AUTO-ENTMCNC: 34.3 G/DL (ref 31.5–36.5)
MCV RBC AUTO: 86 FL (ref 78–100)
METAMYELOCYTES # BLD: 0 10E9/L
METAMYELOCYTES NFR BLD MANUAL: 0.9 %
MISCELLANEOUS TEST: NORMAL
MONOCYTES # BLD AUTO: 0.5 10E9/L (ref 0–1.3)
MONOCYTES NFR BLD AUTO: 29.8 %
NEUTROPHILS # BLD AUTO: 0.7 10E9/L (ref 1.6–8.3)
NEUTROPHILS NFR BLD AUTO: 45.6 %
NRBC # BLD AUTO: 0 10*3/UL
NRBC BLD AUTO-RTO: 1 /100
NUM BPU REQUESTED: 1
NUM BPU REQUESTED: 1
PLATELET # BLD AUTO: 18 10E9/L (ref 150–450)
PLATELET # BLD AUTO: 7 10E9/L (ref 150–450)
PLATELET # BLD EST: ABNORMAL 10*3/UL
POTASSIUM SERPL-SCNC: 3.6 MMOL/L (ref 3.4–5.3)
PROT SERPL-MCNC: 6.5 G/DL (ref 6.8–8.8)
RBC # BLD AUTO: 2.76 10E12/L (ref 4.4–5.9)
RBC MORPH BLD: NORMAL
SODIUM SERPL-SCNC: 136 MMOL/L (ref 133–144)
TACROLIMUS BLD-MCNC: 4.8 UG/L (ref 5–15)
TME LAST DOSE: ABNORMAL H
TRANSFUSION STATUS PATIENT QL: NORMAL
VWF:AC ACT/NOR PPP IA: 323 % (ref 50–180)
WBC # BLD AUTO: 1.6 10E9/L (ref 4–11)

## 2021-03-11 PROCEDURE — 88264 CHROMOSOME ANALYSIS 20-25: CPT

## 2021-03-11 PROCEDURE — 250N000011 HC RX IP 250 OP 636: Performed by: PEDIATRICS

## 2021-03-11 PROCEDURE — 88271 CYTOGENETICS DNA PROBE: CPT

## 2021-03-11 PROCEDURE — 85049 AUTOMATED PLATELET COUNT: CPT | Performed by: NURSE PRACTITIONER

## 2021-03-11 PROCEDURE — 250N000011 HC RX IP 250 OP 636: Performed by: NURSE PRACTITIONER

## 2021-03-11 PROCEDURE — 88237 TISSUE CULTURE BONE MARROW: CPT

## 2021-03-11 PROCEDURE — 250N000013 HC RX MED GY IP 250 OP 250 PS 637: Performed by: NURSE PRACTITIONER

## 2021-03-11 PROCEDURE — 250N000012 HC RX MED GY IP 250 OP 636 PS 637: Performed by: NURSE PRACTITIONER

## 2021-03-11 PROCEDURE — 250N000009 HC RX 250: Performed by: PEDIATRICS

## 2021-03-11 PROCEDURE — 88189 FLOWCYTOMETRY/READ 16 & >: CPT | Mod: 26 | Performed by: PATHOLOGY

## 2021-03-11 PROCEDURE — 88275 CYTOGENETICS 100-300: CPT

## 2021-03-11 PROCEDURE — 80053 COMPREHEN METABOLIC PANEL: CPT | Performed by: NURSE PRACTITIONER

## 2021-03-11 PROCEDURE — 250N000011 HC RX IP 250 OP 636: Performed by: NURSE ANESTHETIST, CERTIFIED REGISTERED

## 2021-03-11 PROCEDURE — 80197 ASSAY OF TACROLIMUS: CPT | Performed by: NURSE PRACTITIONER

## 2021-03-11 PROCEDURE — 99233 SBSQ HOSP IP/OBS HIGH 50: CPT | Mod: 25 | Performed by: PEDIATRICS

## 2021-03-11 PROCEDURE — 88185 FLOWCYTOMETRY/TC ADD-ON: CPT | Mod: TC | Performed by: NURSE PRACTITIONER

## 2021-03-11 PROCEDURE — 38222 DX BONE MARROW BX & ASPIR: CPT | Performed by: NURSE PRACTITIONER

## 2021-03-11 PROCEDURE — 999N001137 HC STATISTIC FLOW >15 ABY TC 88189: Performed by: NURSE PRACTITIONER

## 2021-03-11 PROCEDURE — P9037 PLATE PHERES LEUKOREDU IRRAD: HCPCS | Performed by: NURSE PRACTITIONER

## 2021-03-11 PROCEDURE — 85018 HEMOGLOBIN: CPT | Performed by: NURSE PRACTITIONER

## 2021-03-11 PROCEDURE — 250N000009 HC RX 250: Performed by: NURSE PRACTITIONER

## 2021-03-11 PROCEDURE — 88291 CYTO/MOLECULAR REPORT: CPT | Mod: 26 | Performed by: MEDICAL GENETICS

## 2021-03-11 PROCEDURE — 370N000017 HC ANESTHESIA TECHNICAL FEE, PER MIN: Performed by: NURSE PRACTITIONER

## 2021-03-11 PROCEDURE — 88184 FLOWCYTOMETRY/ TC 1 MARKER: CPT | Mod: TC | Performed by: NURSE PRACTITIONER

## 2021-03-11 PROCEDURE — 85025 COMPLETE CBC W/AUTO DIFF WBC: CPT | Performed by: NURSE PRACTITIONER

## 2021-03-11 PROCEDURE — 36592 COLLECT BLOOD FROM PICC: CPT | Performed by: NURSE PRACTITIONER

## 2021-03-11 PROCEDURE — 272N000008 HC KIT BIOPSY BONE MARROW: Performed by: NURSE PRACTITIONER

## 2021-03-11 PROCEDURE — 258N000003 HC RX IP 258 OP 636: Performed by: NURSE ANESTHETIST, CERTIFIED REGISTERED

## 2021-03-11 PROCEDURE — 258N000003 HC RX IP 258 OP 636: Performed by: PEDIATRICS

## 2021-03-11 PROCEDURE — 88280 CHROMOSOME KARYOTYPE STUDY: CPT

## 2021-03-11 PROCEDURE — G0452 MOLECULAR PATHOLOGY INTERPR: HCPCS | Mod: 26 | Performed by: PATHOLOGY

## 2021-03-11 PROCEDURE — 83615 LACTATE (LD) (LDH) ENZYME: CPT | Performed by: NURSE PRACTITIONER

## 2021-03-11 PROCEDURE — 999N000131 HC STATISTIC POST-PROCEDURE RECOVERY CARE: Performed by: NURSE PRACTITIONER

## 2021-03-11 PROCEDURE — 999N000141 HC STATISTIC PRE-PROCEDURE NURSING ASSESSMENT: Performed by: NURSE PRACTITIONER

## 2021-03-11 PROCEDURE — 206N000001 HC R&B BMT UMMC

## 2021-03-11 PROCEDURE — 81268 CHIMERISM ANAL W/CELL SELECT: CPT | Performed by: NURSE PRACTITIONER

## 2021-03-11 RX ORDER — PROPOFOL 10 MG/ML
INJECTION, EMULSION INTRAVENOUS CONTINUOUS PRN
Status: DISCONTINUED | OUTPATIENT
Start: 2021-03-11 | End: 2021-03-11

## 2021-03-11 RX ORDER — MORPHINE SULFATE 2 MG/ML
1 INJECTION, SOLUTION INTRAMUSCULAR; INTRAVENOUS ONCE
Status: DISCONTINUED | OUTPATIENT
Start: 2021-03-11 | End: 2021-03-11

## 2021-03-11 RX ORDER — ITRACONAZOLE 100 MG/1
200 CAPSULE ORAL 2 TIMES DAILY
Status: DISCONTINUED | OUTPATIENT
Start: 2021-03-11 | End: 2021-03-15 | Stop reason: HOSPADM

## 2021-03-11 RX ORDER — MORPHINE SULFATE 2 MG/ML
0.75 INJECTION, SOLUTION INTRAMUSCULAR; INTRAVENOUS EVERY 12 HOURS
Status: DISCONTINUED | OUTPATIENT
Start: 2021-03-11 | End: 2021-03-11

## 2021-03-11 RX ORDER — SODIUM CHLORIDE, SODIUM LACTATE, POTASSIUM CHLORIDE, CALCIUM CHLORIDE 600; 310; 30; 20 MG/100ML; MG/100ML; MG/100ML; MG/100ML
INJECTION, SOLUTION INTRAVENOUS CONTINUOUS PRN
Status: DISCONTINUED | OUTPATIENT
Start: 2021-03-11 | End: 2021-03-11

## 2021-03-11 RX ORDER — ONDANSETRON 2 MG/ML
INJECTION INTRAMUSCULAR; INTRAVENOUS PRN
Status: DISCONTINUED | OUTPATIENT
Start: 2021-03-11 | End: 2021-03-11

## 2021-03-11 RX ORDER — PROPOFOL 10 MG/ML
INJECTION, EMULSION INTRAVENOUS PRN
Status: DISCONTINUED | OUTPATIENT
Start: 2021-03-11 | End: 2021-03-11

## 2021-03-11 RX ORDER — MORPHINE SULFATE 2 MG/ML
0.75 INJECTION, SOLUTION INTRAMUSCULAR; INTRAVENOUS EVERY 8 HOURS
Status: DISCONTINUED | OUTPATIENT
Start: 2021-03-11 | End: 2021-03-12

## 2021-03-11 RX ORDER — FENTANYL CITRATE 50 UG/ML
INJECTION, SOLUTION INTRAMUSCULAR; INTRAVENOUS PRN
Status: DISCONTINUED | OUTPATIENT
Start: 2021-03-11 | End: 2021-03-11

## 2021-03-11 RX ADMIN — MORPHINE SULFATE 1 MG: 2 INJECTION, SOLUTION INTRAMUSCULAR; INTRAVENOUS at 23:11

## 2021-03-11 RX ADMIN — MORPHINE SULFATE 0.75 MG: 2 INJECTION, SOLUTION INTRAMUSCULAR; INTRAVENOUS at 01:54

## 2021-03-11 RX ADMIN — PROPOFOL 100 MG: 10 INJECTION, EMULSION INTRAVENOUS at 08:14

## 2021-03-11 RX ADMIN — MICAFUNGIN SODIUM 50 MG: 50 INJECTION, POWDER, LYOPHILIZED, FOR SOLUTION INTRAVENOUS at 11:23

## 2021-03-11 RX ADMIN — PROPOFOL 20 MG: 10 INJECTION, EMULSION INTRAVENOUS at 08:21

## 2021-03-11 RX ADMIN — VANCOMYCIN HYDROCHLORIDE 125 MG: 125 CAPSULE ORAL at 19:36

## 2021-03-11 RX ADMIN — ONDANSETRON 4 MG: 2 INJECTION INTRAMUSCULAR; INTRAVENOUS at 08:37

## 2021-03-11 RX ADMIN — PANTOPRAZOLE SODIUM 40 MG: 40 TABLET, DELAYED RELEASE ORAL at 09:33

## 2021-03-11 RX ADMIN — ACETAMINOPHEN 500 MG: 500 TABLET, FILM COATED ORAL at 09:30

## 2021-03-11 RX ADMIN — I.V. FAT EMULSION 240 ML: 20 EMULSION INTRAVENOUS at 19:43

## 2021-03-11 RX ADMIN — VANCOMYCIN HYDROCHLORIDE 125 MG: 125 CAPSULE ORAL at 09:31

## 2021-03-11 RX ADMIN — Medication 315 MCG: at 19:00

## 2021-03-11 RX ADMIN — MORPHINE SULFATE 0.75 MG: 2 INJECTION, SOLUTION INTRAMUSCULAR; INTRAVENOUS at 18:22

## 2021-03-11 RX ADMIN — PHENYLEPHRINE HYDROCHLORIDE 50 MCG: 10 INJECTION INTRAVENOUS at 08:25

## 2021-03-11 RX ADMIN — MYCOPHENOLATE MOFETIL 960 MG: 500 INJECTION, POWDER, LYOPHILIZED, FOR SOLUTION INTRAVENOUS at 13:44

## 2021-03-11 RX ADMIN — VALACYCLOVIR HYDROCHLORIDE 1000 MG: 500 TABLET, FILM COATED ORAL at 09:33

## 2021-03-11 RX ADMIN — VANCOMYCIN HYDROCHLORIDE 125 MG: 125 CAPSULE ORAL at 13:30

## 2021-03-11 RX ADMIN — FENTANYL CITRATE 25 MCG: 50 INJECTION, SOLUTION INTRAMUSCULAR; INTRAVENOUS at 08:19

## 2021-03-11 RX ADMIN — URSODIOL 600 MG: 300 CAPSULE ORAL at 13:42

## 2021-03-11 RX ADMIN — ITRACONAZOLE 200 MG: 100 CAPSULE ORAL at 19:37

## 2021-03-11 RX ADMIN — PROPOFOL 300 MCG/KG/MIN: 10 INJECTION, EMULSION INTRAVENOUS at 08:14

## 2021-03-11 RX ADMIN — MORPHINE SULFATE 1 MG: 2 INJECTION, SOLUTION INTRAMUSCULAR; INTRAVENOUS at 09:51

## 2021-03-11 RX ADMIN — ACETAMINOPHEN 500 MG: 500 TABLET, FILM COATED ORAL at 02:51

## 2021-03-11 RX ADMIN — URSODIOL 600 MG: 300 CAPSULE ORAL at 19:36

## 2021-03-11 RX ADMIN — VANCOMYCIN HYDROCHLORIDE 125 MG: 125 CAPSULE ORAL at 16:28

## 2021-03-11 RX ADMIN — PANTOPRAZOLE SODIUM 40 MG: 40 TABLET, DELAYED RELEASE ORAL at 19:37

## 2021-03-11 RX ADMIN — SODIUM CHLORIDE: 234 INJECTION INTRAMUSCULAR; INTRAVENOUS; SUBCUTANEOUS at 19:43

## 2021-03-11 RX ADMIN — TACROLIMUS 1 MG: 1 CAPSULE ORAL at 19:37

## 2021-03-11 RX ADMIN — PROPOFOL 20 MG: 10 INJECTION, EMULSION INTRAVENOUS at 08:22

## 2021-03-11 RX ADMIN — DIPHENHYDRAMINE HYDROCHLORIDE 50 MG: 50 INJECTION, SOLUTION INTRAMUSCULAR; INTRAVENOUS at 09:30

## 2021-03-11 RX ADMIN — LORAZEPAM 1 MG: 2 INJECTION INTRAMUSCULAR; INTRAVENOUS at 19:36

## 2021-03-11 RX ADMIN — VALACYCLOVIR HYDROCHLORIDE 1000 MG: 500 TABLET, FILM COATED ORAL at 19:37

## 2021-03-11 RX ADMIN — MYCOPHENOLATE MOFETIL 960 MG: 500 INJECTION, POWDER, LYOPHILIZED, FOR SOLUTION INTRAVENOUS at 05:59

## 2021-03-11 RX ADMIN — MYCOPHENOLATE MOFETIL 960 MG: 500 INJECTION, POWDER, LYOPHILIZED, FOR SOLUTION INTRAVENOUS at 21:50

## 2021-03-11 RX ADMIN — SODIUM CHLORIDE, SODIUM LACTATE, POTASSIUM CHLORIDE, CALCIUM CHLORIDE: 600; 310; 30; 20 INJECTION, SOLUTION INTRAVENOUS at 08:14

## 2021-03-11 RX ADMIN — TACROLIMUS 1 MG: 1 CAPSULE ORAL at 09:32

## 2021-03-11 RX ADMIN — VALACYCLOVIR HYDROCHLORIDE 1000 MG: 500 TABLET, FILM COATED ORAL at 13:43

## 2021-03-11 RX ADMIN — PHENYLEPHRINE HYDROCHLORIDE 50 MCG: 10 INJECTION INTRAVENOUS at 08:36

## 2021-03-11 RX ADMIN — URSODIOL 600 MG: 300 CAPSULE ORAL at 09:31

## 2021-03-11 RX ADMIN — LORAZEPAM 1 MG: 2 INJECTION INTRAMUSCULAR; INTRAVENOUS at 11:19

## 2021-03-11 ASSESSMENT — MIFFLIN-ST. JEOR: SCORE: 1615.37

## 2021-03-11 NOTE — PLAN OF CARE
Afebrile, vss, lungs clear but diminished in bases bilaterally.   Returned from peds sedation at 0930. BM biopsy dressing clean, dry , intact. C/o biopsy site pain. Patient wanted larger dose of morphine than the scheduled wean amount. JOHN Shin informed. Prn dose given. Patient also got blood product premeds at this time, platelets ordered due to post procedure bleeding from site. Pressure dressing applied by peds sedation RN, dressing remained D/I. Pre platelet infusion level drawn per, JOHN Shin. Hgb also checked, since level was close to administraton parameter. Hgb 7.8 but no cells were given at this time, (JOHN Shin talked to mother and since patient was close to parameter and asymptomatic, none given since he had gotten a large amount of fluids already today with procedure)  Patient slept after premeds and pain meds given. Platelets given without problem.   No apparent nausea noted or reported.

## 2021-03-11 NOTE — PROGRESS NOTES
Pediatric BMT Daily Progress Note    Interval Events: Isael remains afebrile and clinically stable as counts continue to recover. Bone marrow biopsy this AM with some oozing at the site reportedly from peds sedation for which the dressing was changed. Tolerating wean of supportive medications.     Review of Systems: Pertinent positives include those mentioned in interval events. A complete review of systems was performed and is otherwise negative.      Medications:  Please see MAR    Physical Exam:    Temp:  [97.9  F (36.6  C)-99.9  F (37.7  C)] 97.9  F (36.6  C)  Pulse:  [] 84  Resp:  [18-22] 20  BP: ()/(53-66) 106/64  Cuff Mean (mmHg):  [75] 75  SpO2:  [98 %-100 %] 99 %     I/O last 3 completed shifts:  In: 2980 [I.V.:669; Other:103]  Out: 2835 [Urine:2535; Stool:300]     GEN: Interactive, pleasant, NAD. Mom at bedside.   HEENT: Head NC/AT, PERRL, EOMs intact, sclerae clear, nares patent, MMM.   CARD: RRR, no murmur.  RESP: Lungs clear to auscultation bilaterally. Diminished in the bases. Normal work of breathing.  ABD: Soft, NT, ND, bowel sounds present but slower no organomegaly or masses noted.   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 Afebrile now >48h, pain and nausea well controlled. Counts recovering. Bone marrow biopsy today.    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 S/p preparative regimen per  [id number] with Thiotepa (days -7 trough -6), Fludarabine (days -5 through -3), Busulfan (days -5 through -3), ATG (days -4 through -2) followed by UCBT (2/16). Neutrophil recovery acheived day +20. Day +21 peripheral VNTRs (3/9) reveal 100% donor myeloid chimerism, CD3 pending.   - Bone marrow biopsy pending from today (no LP needed)     # Risk for GVHD:   - Continue Tacrolimus, goal 5-10. Level 5.2 (3/9), transitioned to oral. Level pending from today.  - Continue MMF-- levels WNL    FEN/Renal:  # Risk for malnutrition: intake minimal  - Regular diet as tolerated  - Dietician to follow  - Continue TPN/IL (cycled to 12 hours)     # Electrolyte abnormalities:    - Check daily electrolytes   - Replace/adjust in TPN as indicated     # Risk for renal dysfunction and fluid overload: GFR (1/28): 119 mls/min  - Monitor I/O's and daily weights    # Risk for aHUS/TA-TMA:    - Monitor LDH qMon/Thurs: 142 (3/8)  - Monitor urine protein/creatinine qTuesday: 0.46 (3/9)     Pulmonary:  # Risk for pulmonary insufficiency: No current concerns    Cardiovascular:  # Asymptomatic WPW syndrome (diagnosed 2018): no interventions to date. Most recent EKG (1/27). ECHO (1/26) normal, EF 64%. 24h Zio patch holter (1/27-1/28) revealed c/w WPW with no ventricular ectopies present. Cardiology following. BNP elevated 664 (2/16) likely attributed to fluid shifts per cardiology. BNP threshold of concern is >5000.  - Obtain weekly (Tu) BNP (WNL at 29 on 3/9) and troponin (WNL at 0.023 on 3/9)  - Follow up ECHO in 6 mos (7/2021)     # Risk for hypertension secondary to medications: normotensive currently, monitoring     Heme:   # Pancytopenia secondary to chemotherapy  - Transfuse for hemoglobin < 8 (experienced mild dizziness and malaise when hgb in 7s); platelet < 10K. Transfuse additional platelets following bone marrow biopsy today, obtain plt and hgb prior to transfusion.  - Hx of transfusion reactions (hives): Premedicate with tylenol and  benadryl prior to pRBCs and plts  - GCSF daily until ANC >/= 2500 x 3 consecutive days    # Recent hx of epistaxis: Completed 3 day course of afrin (2/23-2/25), plt transfusion parameter previously <20k.   - Continue Afrin PRN    Infectious Disease:  # Neutropenic fever (3/8): s/p Cefepime  - Follow blood cultures (3/8) from CVC and port-a-cath: NGTD    # C.Difficile colitis (2/17): PO vanco (2/17-2/26), restarted due to increased frequency and volume of watery stool with abdominal cramping.   - Continue second 10d course of oral vanco (3/5-3/15)     # Risk for infection given immunocompromised status with need for prophylaxis:  - Viral (CMV/HSV sero positive): HD Acyclovir. Weekly CMV neg 3/9.  - Fungal: transition from Micafungin to oral Itraconazole today.  - PCP: Pentamidine given 2/15. Consider Bactrim next month if WBC criteria met (no hx of adverse reaction).  - Hypogammaglobinemia: IgG 617 (3/6), continue checking q2 weeks and replace if <400 (no hx replacement at [facility]). Next IgG due 3/20.     Past infections: C. Diff (November 2020)     GI:   # Reflux:  - BID protonix   - PRN Tums     # Nausea: s/p 3d course of emend x2 (2/16-2/18 and 2/21-2/23)   - Ativan BID, weaning  - Benadryl prn     # Risk for VOD: most recent abd US (2/22) revealed stable, mild hepatomegaly (previously enrolled in elastography study)  - Ursodiol TID     Neuro:  # Mucositis/abdominal pain:   - Tylenol PRN  - Mugard QID PRN- home medication  - Continue morphine, will not wean due to biopsy site pain. Continue PRN.  - Encourage warm packs    # Left lateral shoulder/deltoid pain (3/6): c/w MSK pain, decreased ROM, pain on palpation. Much improved.   - continue PT   - Lidocaine patch     # History of significant spinal headaches: consider using Candelario needle with lumbar punctures    # CRS history. 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 No CRS,  neurotoxicity or infectious complications from [place] CAR-T.     Derm:  # Barbie-area rash: resolved, s/p nystatin ointment    Fertility: Sperm collected previously collected for fertility preservation     Discharge Considerations: Expected lengths of hospitalization for patients undergoing stem cell transplantation vary by primary diagnosis, conditioning regimen, graft source, and development of complications. A typical stay is 6 weeks.    The above plan of care was developed by and communicated to me by the Pediatric BMT attending physician, Sid Girard MD.    BENJA Rose-[facility]  [address] [address]  [phone]  [phone]    BMT Attending Note:    Artemio was seen and evaluated by me today.     The significant interval history includes: Vitals stable, continues to be afebrile. Underwent bone marrow biopsy today, some oozing post-procedure which resolved.       I have reviewed changes and data from the last 24 hours, including medications, laboratory results and vital signs.     I have formulated and discussed the plan with the BMT team. I discussed the course and plan with the patient/family and answered all of their questions to the best of my ability. I counseled them regarding the followin y/o M w/ h/o multiply relapsed Pre-B cell ALL (s/p MSD BMT, CART-19, CART-22) in CR3 here for consolidation of remission with MAC (FluBuTT ATG) conditioning, now S/P UCBT, engrafted, at risk for GVHD, at high risk for opportunistic infection, C. difficile colitis, at risk for malnutrition, nausea, resolving mucositis pain, at risk for VOD, at risk for gastritis, WPW, shoulder pain of unclear etiology-now resolving and tolerating ativan and morphine wean. Anticipatory guidance re: other potential transplant related complications and anticipated discharge.   Will follow up on bone  marrow biopsy results.    My care coordination activities today include oversight of planned lab studies, oversight of medication changes and discussion with BMT team-members.    My total floor time today was greater than 45 minutes, at least 50% of which was counseling and coordination of care.    Sid Girard MD    Pediatric Blood and Marrow Transplant   [facility]  Pager: [phone]        Patient Active Problem List   Diagnosis     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)     Aaron-Parkinson-White (WPW) syndrome     Bone marrow transplant candidate     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)     At risk for infection     S/P allogeneic bone marrow transplant (H)     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     Examination of participant or control in clinical research

## 2021-03-11 NOTE — PLAN OF CARE
Afebrile. VSS. Lung sounds clear. No reports of pain or nausea. No family at bedside. Hourly rounding complete. Will continue to monitor and assess.

## 2021-03-11 NOTE — PROCEDURES
BMT Bone Marrow Biopsy Procedure Note  March 11, 2021 9:38 AM    DIAGNOSIS: ALL     PROCEDURE: Unilateral Bone Marrow Biopsy and Unilateral Aspirate    SITE: Pediatric Sedation Suite    Patient s identification was positively verified by verbal identification and invasive procedure safety checklist was completed.  Informed consent was obtained. Following the administration of propofol as sedation, patient was placed in the  right lateral decubitus position and prepped and draped in a sterile manner.  Approximately 5 cc of 1% Lidocaine was used over the left posterior iliac spine.  Following this a 3 mm incision was made. Trephine bone marrow core(s) was (were) obtained from the [facility]. Bone marrow aspirates were obtained from the [facility]. Aspirates were sent for morphology, immunophenotyping, cytogenetics, molecular diagnostics RFLP and send out for NGS.  A total of approximately 28 ml of marrow was aspirated.  Following this procedure a sterile dressing was applied to the bone marrow biopsy site(s). The patient was placed in the supine position to maintain pressure on the biopsy site. Post-procedure wound care instructions were given. The patient tolerated the procedure well with minimal discomfort.  Complications: First core attempt aborted, did not penetrate into the marrow. Core obtained easily on 2nd attempt. Aspirate obtained on first attempt. Pressure dressing applied due to low platelet count and oozing.  Called to sedation suite post procedure by RN due to saturation of initial pressure dressing, described bleeding as dripping.  RN had already applied new pressure dressing. No visible blood at time of exam. Reported off to BMT inpatient provider as Isael would be returned to unit 4.  Suggested infusion of additional unit of platelets.      Procedure performed by: \    BENJA Chen  [facility]  Pediatric Blood and Marrow Transplant

## 2021-03-11 NOTE — ANESTHESIA CARE TRANSFER NOTE
Patient: Artemio Nino    Procedure(s):  BIOPSY BONE MARROW    Diagnosis: ALL (acute lymphoblastic leukemia) (H) [C91.00]  Diagnosis Additional Information: No value filed.    Anesthesia Type:   General     Note:    Oropharynx: oropharynx clear of all foreign objects  Level of Consciousness: drowsy  Oxygen Supplementation: nasal cannula  Level of Supplemental Oxygen (L/min / FiO2): 4  Independent Airway: airway patency satisfactory and stable  Dentition: dentition unchanged  Vital Signs Stable: post-procedure vital signs reviewed and stable  Report to RN Given: handoff report given  Patient transferred to:  Recovery    Handoff Report: Identifed the Patient, Identified the Reponsible Provider, Reviewed the pertinent medical history, Discussed the surgical course, Reviewed Intra-OP anesthesia mangement and issues during anesthesia, Set expectations for post-procedure period and Allowed opportunity for questions and acknowledgement of understanding      Vitals: (Last set prior to Anesthesia Care Transfer)  CRNA VITALS  3/11/2021 0810 - 3/11/2021 0845      3/11/2021             NIBP:  (!) 80/45    Pulse:  84    NIBP Mean:  60    Ht Rate:  84    Temp:  36.5  C (97.7  F)    SpO2:  100 %    Resp Rate (observed):  14        Electronically Signed By: SNOW Peter CRNA  March 11, 2021  8:45 AM

## 2021-03-11 NOTE — ANESTHESIA POSTPROCEDURE EVALUATION
Patient: Artemio Nino    Procedure(s):  BIOPSY BONE MARROW    Diagnosis:ALL (acute lymphoblastic leukemia) (H) [C91.00]  Diagnosis Additional Information: No value filed.    Anesthesia Type:  General    Note:  Disposition: Admission   Postop Pain Control: Uneventful            Sign Out: Well controlled pain   PONV: No   Neuro/Psych: Uneventful            Sign Out: Acceptable/Baseline neuro status   Airway/Respiratory: Uneventful            Sign Out: Acceptable/Baseline resp. status   CV/Hemodynamics: Uneventful            Sign Out: Acceptable CV status   Other NRE: NONE   DID A NON-ROUTINE EVENT OCCUR? No    Event details/Postop Comments:  - Uneventful, ready to transfer to floor         Last vitals:  Vitals:    03/11/21 0413 03/11/21 0700 03/11/21 0845   BP: 96/53 106/64 (!) 84/47   Pulse: 86 84 77   Resp: 22 20 21   Temp: 37.4  C (99.4  F) 36.6  C (97.9  F) 36.5  C (97.7  F)   SpO2: 98% 99% 100%       Last vitals prior to Anesthesia Care Transfer:  CRNA VITALS  3/11/2021 0810 - 3/11/2021 0903      3/11/2021             NIBP:  (!) 80/45    Pulse:  84    NIBP Mean:  60    Ht Rate:  84    Temp:  36.5  C (97.7  F)    SpO2:  100 %    Resp Rate (observed):  14          Electronically Signed By: Con Patel MD  March 11, 2021  9:03 AM

## 2021-03-11 NOTE — PLAN OF CARE
Afebrile. VSS. LSC on RA. Denies pain. Benadryl given x1 for nausea with relief. Voiding. Continues to have loose/liquid stool. Plts given without issue. Second scrub completed for BMB today. Hourly rounding complete. Continue with POC.

## 2021-03-12 LAB
ABO + RH BLD: NORMAL
ABO + RH BLD: NORMAL
ANION GAP SERPL CALCULATED.3IONS-SCNC: 4 MMOL/L (ref 3–14)
ANION GAP SERPL CALCULATED.3IONS-SCNC: 7 MMOL/L (ref 3–14)
BACTERIA SPEC CULT: NORMAL
BASOPHILS # BLD AUTO: 0 10E9/L (ref 0–0.2)
BASOPHILS # BLD AUTO: 0 10E9/L (ref 0–0.2)
BASOPHILS NFR BLD AUTO: 0 %
BASOPHILS NFR BLD AUTO: 1.8 %
BLD GP AB SCN SERPL QL: NORMAL
BLD PROD TYP BPU: NORMAL
BLD UNIT ID BPU: 0
BLD UNIT ID BPU: 0
BLOOD BANK CMNT PATIENT-IMP: NORMAL
BLOOD PRODUCT CODE: NORMAL
BLOOD PRODUCT CODE: NORMAL
BPU ID: NORMAL
BPU ID: NORMAL
BUN SERPL-MCNC: 15 MG/DL (ref 7–30)
BUN SERPL-MCNC: 18 MG/DL (ref 7–30)
CA-I BLD-MCNC: 4.9 MG/DL (ref 4.4–5.2)
CALCIUM SERPL-MCNC: 7.1 MG/DL (ref 8.5–10.1)
CALCIUM SERPL-MCNC: 9.1 MG/DL (ref 8.5–10.1)
CHLORIDE SERPL-SCNC: 106 MMOL/L (ref 94–109)
CHLORIDE SERPL-SCNC: 113 MMOL/L (ref 94–109)
CO2 SERPL-SCNC: 20 MMOL/L (ref 20–32)
CO2 SERPL-SCNC: 27 MMOL/L (ref 20–32)
COPATH REPORT: NORMAL
COPATH REPORT: NORMAL
CREAT SERPL-MCNC: 0.44 MG/DL (ref 0.66–1.25)
CREAT SERPL-MCNC: 0.61 MG/DL (ref 0.66–1.25)
DIFFERENTIAL METHOD BLD: ABNORMAL
DIFFERENTIAL METHOD BLD: ABNORMAL
EOSINOPHIL # BLD AUTO: 0 10E9/L (ref 0–0.7)
EOSINOPHIL # BLD AUTO: 0 10E9/L (ref 0–0.7)
EOSINOPHIL NFR BLD AUTO: 0 %
EOSINOPHIL NFR BLD AUTO: 0.9 %
ERYTHROCYTE [DISTWIDTH] IN BLOOD BY AUTOMATED COUNT: 11.1 % (ref 10–15)
ERYTHROCYTE [DISTWIDTH] IN BLOOD BY AUTOMATED COUNT: 11.4 % (ref 10–15)
GFR SERPL CREATININE-BSD FRML MDRD: >90 ML/MIN/{1.73_M2}
GFR SERPL CREATININE-BSD FRML MDRD: >90 ML/MIN/{1.73_M2}
GLUCOSE SERPL-MCNC: 113 MG/DL (ref 70–99)
GLUCOSE SERPL-MCNC: 127 MG/DL (ref 70–99)
HCT VFR BLD AUTO: 18.5 % (ref 40–53)
HCT VFR BLD AUTO: 30.4 % (ref 40–53)
HGB BLD-MCNC: 10.7 G/DL (ref 13.3–17.7)
HGB BLD-MCNC: 6.6 G/DL (ref 13.3–17.7)
LYMPHOCYTES # BLD AUTO: 0.3 10E9/L (ref 0.8–5.3)
LYMPHOCYTES # BLD AUTO: 0.6 10E9/L (ref 0.8–5.3)
LYMPHOCYTES NFR BLD AUTO: 21.9 %
LYMPHOCYTES NFR BLD AUTO: 23.2 %
MAGNESIUM SERPL-MCNC: 1.3 MG/DL (ref 1.6–2.3)
MAGNESIUM SERPL-MCNC: 2.3 MG/DL (ref 1.6–2.3)
MCH RBC QN AUTO: 29.2 PG (ref 26.5–33)
MCH RBC QN AUTO: 30.3 PG (ref 26.5–33)
MCHC RBC AUTO-ENTMCNC: 35.2 G/DL (ref 31.5–36.5)
MCHC RBC AUTO-ENTMCNC: 35.7 G/DL (ref 31.5–36.5)
MCV RBC AUTO: 83 FL (ref 78–100)
MCV RBC AUTO: 85 FL (ref 78–100)
MONOCYTES # BLD AUTO: 0.5 10E9/L (ref 0–1.3)
MONOCYTES # BLD AUTO: 0.7 10E9/L (ref 0–1.3)
MONOCYTES NFR BLD AUTO: 29.5 %
MONOCYTES NFR BLD AUTO: 31.6 %
NEUTROPHILS # BLD AUTO: 0.7 10E9/L (ref 1.6–8.3)
NEUTROPHILS # BLD AUTO: 1.2 10E9/L (ref 1.6–8.3)
NEUTROPHILS NFR BLD AUTO: 44.7 %
NEUTROPHILS NFR BLD AUTO: 46.4 %
NUM BPU REQUESTED: 2
PHOSPHATE SERPL-MCNC: 3 MG/DL (ref 2.5–4.5)
PLATELET # BLD AUTO: 14 10E9/L (ref 150–450)
PLATELET # BLD AUTO: 17 10E9/L (ref 150–450)
PLATELET # BLD EST: ABNORMAL 10*3/UL
PLATELET # BLD EST: ABNORMAL 10*3/UL
POTASSIUM SERPL-SCNC: 3.1 MMOL/L (ref 3.4–5.3)
POTASSIUM SERPL-SCNC: 4.6 MMOL/L (ref 3.4–5.3)
RBC # BLD AUTO: 2.18 10E12/L (ref 4.4–5.9)
RBC # BLD AUTO: 3.67 10E12/L (ref 4.4–5.9)
RBC MORPH BLD: NORMAL
RBC MORPH BLD: NORMAL
SODIUM SERPL-SCNC: 137 MMOL/L (ref 133–144)
SODIUM SERPL-SCNC: 140 MMOL/L (ref 133–144)
SPECIMEN EXP DATE BLD: NORMAL
SPECIMEN SOURCE: NORMAL
TRANSFUSION STATUS PATIENT QL: NORMAL
WBC # BLD AUTO: 1.5 10E9/L (ref 4–11)
WBC # BLD AUTO: 2.5 10E9/L (ref 4–11)

## 2021-03-12 PROCEDURE — P9040 RBC LEUKOREDUCED IRRADIATED: HCPCS | Performed by: PEDIATRICS

## 2021-03-12 PROCEDURE — 258N000003 HC RX IP 258 OP 636: Performed by: NURSE PRACTITIONER

## 2021-03-12 PROCEDURE — 250N000009 HC RX 250: Performed by: PEDIATRICS

## 2021-03-12 PROCEDURE — 250N000013 HC RX MED GY IP 250 OP 250 PS 637: Performed by: NURSE PRACTITIONER

## 2021-03-12 PROCEDURE — 80048 BASIC METABOLIC PNL TOTAL CA: CPT | Performed by: NURSE PRACTITIONER

## 2021-03-12 PROCEDURE — 85025 COMPLETE CBC W/AUTO DIFF WBC: CPT | Performed by: NURSE PRACTITIONER

## 2021-03-12 PROCEDURE — 250N000011 HC RX IP 250 OP 636: Performed by: NURSE PRACTITIONER

## 2021-03-12 PROCEDURE — 250N000012 HC RX MED GY IP 250 OP 636 PS 637: Performed by: NURSE PRACTITIONER

## 2021-03-12 PROCEDURE — 250N000013 HC RX MED GY IP 250 OP 250 PS 637: Performed by: STUDENT IN AN ORGANIZED HEALTH CARE EDUCATION/TRAINING PROGRAM

## 2021-03-12 PROCEDURE — 206N000001 HC R&B BMT UMMC

## 2021-03-12 PROCEDURE — 82330 ASSAY OF CALCIUM: CPT | Performed by: NURSE PRACTITIONER

## 2021-03-12 PROCEDURE — 83735 ASSAY OF MAGNESIUM: CPT | Performed by: PEDIATRICS

## 2021-03-12 PROCEDURE — 250N000009 HC RX 250: Performed by: NURSE PRACTITIONER

## 2021-03-12 PROCEDURE — 84100 ASSAY OF PHOSPHORUS: CPT | Performed by: NURSE PRACTITIONER

## 2021-03-12 PROCEDURE — 83735 ASSAY OF MAGNESIUM: CPT | Performed by: NURSE PRACTITIONER

## 2021-03-12 PROCEDURE — 99233 SBSQ HOSP IP/OBS HIGH 50: CPT | Performed by: PEDIATRICS

## 2021-03-12 PROCEDURE — 258N000001 HC RX 258: Performed by: NURSE PRACTITIONER

## 2021-03-12 RX ORDER — VALACYCLOVIR HYDROCHLORIDE 1 G/1
1000 TABLET, FILM COATED ORAL 3 TIMES DAILY
Qty: 90 TABLET | Refills: 1 | Status: SHIPPED | OUTPATIENT
Start: 2021-03-12 | End: 2021-05-10

## 2021-03-12 RX ORDER — PANTOPRAZOLE SODIUM 40 MG/1
40 TABLET, DELAYED RELEASE ORAL 2 TIMES DAILY
Qty: 60 TABLET | Refills: 0 | Status: SHIPPED | OUTPATIENT
Start: 2021-03-12 | End: 2021-04-05

## 2021-03-12 RX ORDER — SULFAMETHOXAZOLE AND TRIMETHOPRIM 400; 80 MG/1; MG/1
2 TABLET ORAL
Qty: 32 TABLET | Refills: 1 | Status: SHIPPED | OUTPATIENT
Start: 2021-03-16 | End: 2021-05-19

## 2021-03-12 RX ORDER — LORAZEPAM 1 MG/1
1 TABLET ORAL 2 TIMES DAILY
Qty: 60 TABLET | Refills: 0 | Status: SHIPPED | OUTPATIENT
Start: 2021-03-12 | End: 2021-03-17

## 2021-03-12 RX ORDER — OXYCODONE HYDROCHLORIDE 5 MG/1
5 TABLET ORAL EVERY 6 HOURS PRN
Qty: 30 TABLET | Refills: 0 | Status: SHIPPED | OUTPATIENT
Start: 2021-03-12 | End: 2021-03-19

## 2021-03-12 RX ORDER — HEPARIN SODIUM,PORCINE 10 UNIT/ML
2 VIAL (ML) INTRAVENOUS
Status: CANCELLED | OUTPATIENT
Start: 2021-03-15

## 2021-03-12 RX ORDER — LORAZEPAM 0.5 MG/1
1 TABLET ORAL 2 TIMES DAILY
Status: DISCONTINUED | OUTPATIENT
Start: 2021-03-12 | End: 2021-03-15 | Stop reason: HOSPADM

## 2021-03-12 RX ORDER — ITRACONAZOLE 100 MG/1
200 CAPSULE ORAL 2 TIMES DAILY
Qty: 120 CAPSULE | Refills: 1 | Status: SHIPPED | OUTPATIENT
Start: 2021-03-12 | End: 2021-03-23

## 2021-03-12 RX ORDER — OXYCODONE HYDROCHLORIDE 5 MG/1
5 TABLET ORAL EVERY 6 HOURS PRN
Status: DISCONTINUED | OUTPATIENT
Start: 2021-03-12 | End: 2021-03-14

## 2021-03-12 RX ORDER — LIDOCAINE 4 G/G
1 PATCH TOPICAL EVERY 24 HOURS
Qty: 7 PATCH | Refills: 0 | Status: SHIPPED | OUTPATIENT
Start: 2021-03-12 | End: 2021-03-15

## 2021-03-12 RX ORDER — LIDOCAINE 4 G/G
1 PATCH TOPICAL
Status: DISCONTINUED | OUTPATIENT
Start: 2021-03-12 | End: 2021-03-13

## 2021-03-12 RX ADMIN — HEPARIN, PORCINE (PF) 10 UNIT/ML INTRAVENOUS SYRINGE 3 ML: at 10:56

## 2021-03-12 RX ADMIN — MYCOPHENOLATE MOFETIL 960 MG: 500 INJECTION, POWDER, LYOPHILIZED, FOR SOLUTION INTRAVENOUS at 05:44

## 2021-03-12 RX ADMIN — LORAZEPAM 1 MG: 2 INJECTION INTRAMUSCULAR; INTRAVENOUS at 08:51

## 2021-03-12 RX ADMIN — DIPHENHYDRAMINE HYDROCHLORIDE 50 MG: 50 INJECTION, SOLUTION INTRAMUSCULAR; INTRAVENOUS at 21:53

## 2021-03-12 RX ADMIN — ITRACONAZOLE 200 MG: 100 CAPSULE ORAL at 20:09

## 2021-03-12 RX ADMIN — VANCOMYCIN HYDROCHLORIDE 125 MG: 125 CAPSULE ORAL at 20:09

## 2021-03-12 RX ADMIN — ACETAMINOPHEN 500 MG: 500 TABLET, FILM COATED ORAL at 01:08

## 2021-03-12 RX ADMIN — Medication 2.5 MG: at 10:36

## 2021-03-12 RX ADMIN — VANCOMYCIN HYDROCHLORIDE 125 MG: 125 CAPSULE ORAL at 15:59

## 2021-03-12 RX ADMIN — HEPARIN, PORCINE (PF) 10 UNIT/ML INTRAVENOUS SYRINGE 3 ML: at 11:00

## 2021-03-12 RX ADMIN — DEXTROSE AND SODIUM CHLORIDE: 5; 450 INJECTION, SOLUTION INTRAVENOUS at 05:45

## 2021-03-12 RX ADMIN — URSODIOL 600 MG: 300 CAPSULE ORAL at 13:25

## 2021-03-12 RX ADMIN — VALACYCLOVIR HYDROCHLORIDE 1000 MG: 500 TABLET, FILM COATED ORAL at 20:09

## 2021-03-12 RX ADMIN — SODIUM CHLORIDE: 234 INJECTION INTRAMUSCULAR; INTRAVENOUS; SUBCUTANEOUS at 20:18

## 2021-03-12 RX ADMIN — MYCOPHENOLATE MOFETIL 960 MG: 500 INJECTION, POWDER, LYOPHILIZED, FOR SOLUTION INTRAVENOUS at 21:57

## 2021-03-12 RX ADMIN — DIPHENHYDRAMINE HYDROCHLORIDE 50 MG: 50 INJECTION, SOLUTION INTRAMUSCULAR; INTRAVENOUS at 01:08

## 2021-03-12 RX ADMIN — VALACYCLOVIR HYDROCHLORIDE 1000 MG: 500 TABLET, FILM COATED ORAL at 13:25

## 2021-03-12 RX ADMIN — VANCOMYCIN HYDROCHLORIDE 125 MG: 125 CAPSULE ORAL at 08:52

## 2021-03-12 RX ADMIN — DIPHENHYDRAMINE HYDROCHLORIDE 50 MG: 50 INJECTION, SOLUTION INTRAMUSCULAR; INTRAVENOUS at 15:59

## 2021-03-12 RX ADMIN — MAGNESIUM SULFATE HEPTAHYDRATE 3000 MG: 500 INJECTION, SOLUTION INTRAMUSCULAR; INTRAVENOUS at 02:51

## 2021-03-12 RX ADMIN — Medication 315 MCG: at 20:05

## 2021-03-12 RX ADMIN — I.V. FAT EMULSION 240 ML: 20 EMULSION INTRAVENOUS at 20:18

## 2021-03-12 RX ADMIN — URSODIOL 600 MG: 300 CAPSULE ORAL at 08:52

## 2021-03-12 RX ADMIN — ITRACONAZOLE 200 MG: 100 CAPSULE ORAL at 08:52

## 2021-03-12 RX ADMIN — Medication 1 MG: at 20:08

## 2021-03-12 RX ADMIN — MYCOPHENOLATE MOFETIL 960 MG: 500 INJECTION, POWDER, LYOPHILIZED, FOR SOLUTION INTRAVENOUS at 14:25

## 2021-03-12 RX ADMIN — Medication 2.5 MG: at 21:55

## 2021-03-12 RX ADMIN — TACROLIMUS 1 MG: 1 CAPSULE ORAL at 08:52

## 2021-03-12 RX ADMIN — URSODIOL 600 MG: 300 CAPSULE ORAL at 20:09

## 2021-03-12 RX ADMIN — PANTOPRAZOLE SODIUM 40 MG: 40 TABLET, DELAYED RELEASE ORAL at 08:52

## 2021-03-12 RX ADMIN — VANCOMYCIN HYDROCHLORIDE 125 MG: 125 CAPSULE ORAL at 13:25

## 2021-03-12 RX ADMIN — PANTOPRAZOLE SODIUM 40 MG: 40 TABLET, DELAYED RELEASE ORAL at 20:09

## 2021-03-12 RX ADMIN — TACROLIMUS 1 MG: 1 CAPSULE ORAL at 20:09

## 2021-03-12 RX ADMIN — ALTEPLASE 2 MG: 2.2 INJECTION, POWDER, LYOPHILIZED, FOR SOLUTION INTRAVENOUS at 09:40

## 2021-03-12 RX ADMIN — VALACYCLOVIR HYDROCHLORIDE 1000 MG: 500 TABLET, FILM COATED ORAL at 08:52

## 2021-03-12 RX ADMIN — MORPHINE SULFATE 0.75 MG: 2 INJECTION, SOLUTION INTRAMUSCULAR; INTRAVENOUS at 01:51

## 2021-03-12 ASSESSMENT — MIFFLIN-ST. JEOR: SCORE: 1618.94

## 2021-03-12 NOTE — PLAN OF CARE
3026-5619: Isael has been afebrile. VSS. Lungs clear on RA. No PRNs needed. Pain 2-3 out of 10 at biopsy site, received scheduled morphine. No N/V. Good UOP. No stool. Mom at bedside. Hourly rounding completed. Continue with POC.

## 2021-03-12 NOTE — PLAN OF CARE
Afebrile, vss, lungs clear, diminished in bases bilaterally.   No apparent nausea noted. Tolerating po meds well so far.  Very little po intake this morning, had some applesauce this afternoon.   Loose stool x1 this afternoon.   Bone marrow biopsy dressing removed, site dry/intact. Cleansed with chloroprep and covered with bandaid.   Red port was slightly resistant with very sluggish to no blood return. TPA  Instilled x1 with good blood return noted after.   Slept intermittently this morning, a little more active this afternoon.   Mom here this morning. Dad here this afternoon.   Hourly rounding completed. Continue with plan of care.

## 2021-03-12 NOTE — PROGRESS NOTES
CLINICAL NUTRITION SERVICES - REASSESSMENT NOTE    ANTHROPOMETRICS  Height: 173.5 cm  Weight (3/11): 63.6 kg   BMI: 20.8 kg / m2  Dosing Weight: 64.5 kg - admission weight used for TPN  Comments/Average Daily Weight Gain: Over the past week pt's weight has fluctuated between 63.4-65.5 kg and has remained stable since admission.     CURRENT NUTRITION ORDERS  Orders Placed This Encounter      Regular Diet Adult    Supplement: Allow pt to order snacks/supplements PRN.    CURRENT NUTRITION SUPPORT   Parenteral Nutrition:  Type of Parenteral Access: Central  PN frequency: Cycled, 12 hrs    PN of 1800mLs, 236 g Dex, GIR of 5.56 mg/kg/min, 100g Amino Acids, (1.55 g/kg), 240 mL lipids, 0.8 g/kg for 1670 kcals, (26 kcal/kg) with 29 % of kcal from lipids. PN is meeting 100% of kcal needs and 100% of protein needs.    Intake/Tolerance: Pt is currently meeting 100% of assessed needs via TPN and tolerating well.     Per chart review, pt has had minimal to no po intake over the past week. He ate noodles, rice and some applesauce over the past week. Per RN notes, pt had intermittent nausea and emesis with loose stools over the past week.     Current factors affecting nutrition intake include:decreased appetite, nausea, vomiting and medical course    NEW FINDINGS:  BMT #2 Day + 24  Bone Marrow Biopsy on 3/11    LABS  Labs reviewed   - elevated    MEDICATIONS  Medications reviewed    ASSESSED NUTRITION NEEDS:  [place] Equation: BMR (1660) x 1.2 - 1.4 = 6728-3772 kcal   Estimated Energy Needs: 30-35 kcal/kg EN/PO; 25-30 kcal/kg PN; 25-35 kcal/kg PN+EN  Estimated Protein Needs: 1.5-2g/kg  Estimated Fluid Needs: 1 ml/kcal or per MD    Micronutrient Needs: per RDA    PEDIATRIC NUTRITION STATUS VALIDATION  Patient does not meet criteria for malnutrition.    EVALUATION OF PREVIOUS PLAN OF CARE:   Monitoring from previous assessment:  Food and Beverage intake -- Limited po intake over the past week. Per chart review, pt had  noodles, rice and some applesauce.  Enteral and parenteral nutrition intake -- TPN currently meeting 100% of assessed needs and pt tolerating well.  Anthropometric measurements -- Over the past week pt's weight has fluctuated between 63.4-65.5 kg and has remained stable since admission.     Previous Goals:   1. Po and/or nutrition support to meet greater than 75% of needs - met  2. Weight maintenance during hospital stay - met    Previous Nutrition Diagnosis:   Predicted suboptimal nutrient intake related to decline in po as evidence by reliance on TPN to meet estimated needs with potential for interruption.  Evaluation: No change    NUTRITION DIAGNOSIS:  Predicted suboptimal nutrient intake related to decline in po as evidence by reliance on TPN to meet estimated needs with potential for interruption.    INTERVENTIONS  Nutrition Prescription  Po/nutrition support to meet needs for age appropriate growth    Implementation:  Parenteral Nutrition -- continue with current parenteral nutrition regimen  Collaboration and Referral of Nutrition care -- discussed plan of care for pt during rounds    Goals  1. Po and/or nutrition support to meet greater than 75% of needs  2. Weight maintenance during hospital stay    FOLLOW UP/MONITORING  Food and Beverage intake --  Enteral and parenteral nutrition intake --  Anthropometric measurements --    RECOMMENDATIONS  1. Continue with current TPN regimen over 12 hrs of 1800mLs, 236 g Dex, GIR of 5.56 mg/kg/min, 100g Amino Acids, (1.55 g/kg), 240 mL lipids, 0.8 g/kg for 1670 kcals, (26 kcal/kg) with 29 % of kcal from lipids. PN is meeting 100% of kcal needs and 100% of protein needs.     2. Monitor weight trends throughout admission.    Tita Guillen RDN, TYLOR  Pager: [phone]

## 2021-03-12 NOTE — PROGRESS NOTES
Pediatric BMT Daily Progress Note    Interval Events: No acute events overnight, pRBCs replaced without issue. Has some soreness at bone marrow biopsy site for which scheduled morphine was effective.    Review of Systems: Pertinent positives include those mentioned in interval events. A complete review of systems was performed and is otherwise negative.    Medications:  Please see MAR    Physical Exam:    Temp:  [97.6  F (36.4  C)-99.7  F (37.6  C)] 98.6  F (37  C)  Pulse:  [77-96] 94  Resp:  [16-24] 18  BP: ()/(47-71) 100/57  Cuff Mean (mmHg):  [54-85] 69  SpO2:  [97 %-100 %] 97 %     I/O last 3 completed shifts:  In: 3923 [I.V.:970]  Out: 2924 [Urine:2924]     GEN: Lying quietly, attentive. NAD. Mom at bedside.   HEENT: Head NC/AT, PERRL, EOMs intact, sclerae clear, nares patent, MMM.   CARD: RRR, no murmur.  RESP: Lungs clear to auscultation bilaterally. Diminished in the bases. Normal work of breathing.  ABD: Soft, NT, ND, bowel sounds present but slower no organomegaly or masses noted.   EXTREM: WWP, MAEE  SKIN: Dry skin, without rashes or bruising. Pressure dressing CDI at L sided BMBx site.  NEURO: No focal deficits  ACCESS: Double lumen burns, port (not accessed)    Labs: All reviewed with pertinent positives: WBC 2.5, ANC 1.2, Hgb 10.7, Plts 14k. BUN 18, Creat 0.61.     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 Afebrile, pain and nausea well controlled. Counts recovering.     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 S/p preparative regimen per [id number] with Thiotepa (days -7 trough -6), Fludarabine (days -5 through -3), Busulfan (days -5 through -3), ATG  (days -4 through -2) followed by UCBT (2/16). Neutrophil recovery acheived day +20. Day +21 peripheral VNTRs (3/9) reveal 100% donor myeloid chimerism, CD3 pending.   - Bone marrow biopsy (3/11): flow negative, remaining tests pending     # Risk for GVHD:   - Continue Tacrolimus, goal 5-10. Level 4.8 yesterday, no adjustment made considering start of itraconazole. Level due tomorrow.   - Continue MMF-- levels WNL    FEN/Renal:  # Risk for malnutrition: intake minimal  - Regular diet as tolerated  - Dietician to follow  - Continue TPN/IL (cycled to 12 hours)     # Electrolyte abnormalities:    - Check daily electrolytes   - Replace/adjust in TPN as indicated     # Risk for renal dysfunction and fluid overload: GFR (1/28): 119 mls/min  - Monitor I/O's and daily weights    # Risk for aHUS/TA-TMA:    - Monitor LDH qMon/Thurs: 142 (3/8)  - Monitor urine protein/creatinine qTuesday: 0.46 (3/9)     Pulmonary:  # Risk for pulmonary insufficiency: No current concerns    Cardiovascular:  # Asymptomatic WPW syndrome (diagnosed 2018): no interventions to date. Most recent EKG (1/27). ECHO (1/26) normal, EF 64%. 24h Zio patch holter (1/27-1/28) revealed c/w WPW with no ventricular ectopies present. Cardiology following. BNP elevated 664 (2/16) likely attributed to fluid shifts per cardiology. BNP threshold of concern is >5000.  - Obtain weekly (Tu) BNP (WNL at 29 on 3/9) and troponin (WNL at 0.023 on 3/9)  - Follow up ECHO in 6 mos (7/2021)     # Risk for hypertension secondary to medications: normotensive currently, monitoring     Heme:   # Pancytopenia secondary to chemotherapy  - Transfuse for hemoglobin < 8 (experienced mild dizziness and malaise when hgb in 7s); platelet < 10K.   - Hx of transfusion reactions (hives): Premedicate with tylenol and benadryl prior to pRBCs and plts  - GCSF daily until ANC >/= 2500 x 3 consecutive days    # Recent hx of epistaxis: Completed 3 day course of afrin (2/23-2/25), plt transfusion  parameter previously <20k.   - Continue Afrin PRN    Infectious Disease:  # Neutropenic fever (3/8): s/p Cefepime  - Follow blood cultures (3/8) from CVC and port-a-cath: NGTD    # C.Difficile colitis (2/17): PO vanco (2/17-2/26), restarted due to increased frequency and volume of watery stool with abdominal cramping.   - Continue second 10d course of oral vanco (3/5-3/15)     # Risk for infection given immunocompromised status with need for prophylaxis:  - Viral (CMV/HSV sero positive): HD Acyclovir. Weekly CMV neg 3/9.  - Fungal: continue Itraconazole (started 3/11)  - PCP: Pentamidine given 2/15. Consider Bactrim next month if WBC criteria met (no hx of adverse reaction).  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 Next IgG due 3/20.     Past infections: C. Diff (November 2020)     GI:   # Reflux:  - BID protonix   - PRN Tums     # Nausea: s/p 3d course of emend x2 (2/16-2/18 and 2/21-2/23)   - Ativan BID-- transition to oral  - Benadryl prn     # Risk for VOD: most recent abd US (2/22) revealed stable, mild hepatomegaly (previously enrolled in elastography study)  - Ursodiol TID     Neuro:  # Mucositis/abdominal pain:   - Tylenol PRN  - Mugard QID PRN- home medication  - Transition from scheduled morphine to oxycodone BID  - Encourage warm packs    # Left lateral shoulder/deltoid pain (3/6): c/w MSK pain, decreased ROM, pain on palpation. Much improved.   - continue PT   - Lidocaine patch     # History of significant spinal headaches: consider using Candelario needle with lumbar punctures    # CRS history. 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 No CRS, neurotoxicity or infectious complications from [place] CAR-T.     Derm:  # Barbie-area rash: resolved, s/p nystatin ointment    Fertility: Sperm collected previously collected for fertility  preservation     Discharge Considerations: Expected lengths of hospitalization for patients undergoing stem cell transplantation vary by primary diagnosis, conditioning regimen, graft source, and development of complications. A typical stay is 6 weeks.    The above plan of care was developed by and communicated to me by the Pediatric BMT attending physician, Sid Girard MD.    BENJA [facility] Blood and Marrow Transplant  [facility]  [address] [place] [address]  [phone]  [phone]    BMT Attending Note:    Artemio was seen and evaluated by me today.     The significant interval history includes: Afebrile, vitals stable.  Transitioning to oral medications. BM flow and morphology negative. Chimerism studies show 100% donor engraftment.    I have reviewed changes and data from the last 24 hours, including medications, laboratory results and vital signs.     I have formulated and discussed the plan with the BMT team. I discussed the course and plan with the patient/family and answered all of their questions to the best of my ability. I counseled them regarding the followin y/o M w/ h/o multiply relapsed Pre-B cell ALL (s/p MSD BMT, CART-19, CART-22) in CR3 here for consolidation of remission with MAC (FluBuTT ATG) conditioning, now S/P UCBT, engrafted, at risk for GVHD, at high risk for opportunistic infection, C. difficile colitis, at risk for malnutrition, nausea, resolving mucositis pain, at risk for VOD, at risk for gastritis, WPW, shoulder pain of unclear etiology-now resolving and tolerating ativan and morphine wean. Anticipatory guidance re: other potential transplant related complications and anticipated discharge.     My care coordination activities today include oversight of planned lab studies, oversight of medication changes and discussion with BMT team-members.    My total floor time today was greater  than 40 minutes, at least 50% of which was counseling and coordination of care.    Sid Girard MD    Pediatric Blood and Marrow Transplant   [facility]  Pager: [phone]    Patient Active Problem List   Diagnosis     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)     Aaron-Parkinson-White (WPW) syndrome     Bone marrow transplant candidate     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)     At risk for infection     S/P allogeneic bone marrow transplant (H)     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     Examination of participant or control in clinical research

## 2021-03-12 NOTE — PLAN OF CARE
Artemio was afebrile, VSS. LS clear and maintaining sats on RA. No complaint of pain. No nausea. Voiding well, no stool. Received pRBCs with premeds, tolerated well. Mag level low, replaced, recheck WNL. Hourly rounding completed. Continue to monitor and notify provider of changes.

## 2021-03-13 LAB
ABO + RH BLD: NORMAL
ABO + RH BLD: NORMAL
ANION GAP SERPL CALCULATED.3IONS-SCNC: 6 MMOL/L (ref 3–14)
BASOPHILS # BLD AUTO: 0 10E9/L (ref 0–0.2)
BASOPHILS NFR BLD AUTO: 0 %
BLD GP AB SCN SERPL QL: NORMAL
BLD PROD TYP BPU: NORMAL
BLD PROD TYP BPU: NORMAL
BLD UNIT ID BPU: 0
BLOOD BANK CMNT PATIENT-IMP: NORMAL
BLOOD PRODUCT CODE: NORMAL
BPU ID: NORMAL
BUN SERPL-MCNC: 19 MG/DL (ref 7–30)
CALCIUM SERPL-MCNC: 8.6 MG/DL (ref 8.5–10.1)
CHLORIDE SERPL-SCNC: 105 MMOL/L (ref 94–109)
CO2 SERPL-SCNC: 23 MMOL/L (ref 20–32)
COPATH REPORT: NORMAL
CREAT SERPL-MCNC: 0.61 MG/DL (ref 0.66–1.25)
DIFFERENTIAL METHOD BLD: ABNORMAL
EOSINOPHIL # BLD AUTO: 0 10E9/L (ref 0–0.7)
EOSINOPHIL NFR BLD AUTO: 0 %
ERYTHROCYTE [DISTWIDTH] IN BLOOD BY AUTOMATED COUNT: 11.9 % (ref 10–15)
GFR SERPL CREATININE-BSD FRML MDRD: >90 ML/MIN/{1.73_M2}
GLUCOSE SERPL-MCNC: 160 MG/DL (ref 70–99)
HCT VFR BLD AUTO: 31.4 % (ref 40–53)
HGB BLD-MCNC: 10.9 G/DL (ref 13.3–17.7)
LYMPHOCYTES # BLD AUTO: 1 10E9/L (ref 0.8–5.3)
LYMPHOCYTES NFR BLD AUTO: 31.9 %
MCH RBC QN AUTO: 29.5 PG (ref 26.5–33)
MCHC RBC AUTO-ENTMCNC: 34.7 G/DL (ref 31.5–36.5)
MCV RBC AUTO: 85 FL (ref 78–100)
MONOCYTES # BLD AUTO: 0.9 10E9/L (ref 0–1.3)
MONOCYTES NFR BLD AUTO: 28.3 %
NEUTROPHILS # BLD AUTO: 1.2 10E9/L (ref 1.6–8.3)
NEUTROPHILS NFR BLD AUTO: 39.8 %
NUM BPU REQUESTED: 1
PLATELET # BLD AUTO: 7 10E9/L (ref 150–450)
PLATELET # BLD EST: ABNORMAL 10*3/UL
POIKILOCYTOSIS BLD QL SMEAR: SLIGHT
POTASSIUM SERPL-SCNC: 3.9 MMOL/L (ref 3.4–5.3)
RBC # BLD AUTO: 3.7 10E12/L (ref 4.4–5.9)
SODIUM SERPL-SCNC: 134 MMOL/L (ref 133–144)
SPECIMEN EXP DATE BLD: NORMAL
TACROLIMUS BLD-MCNC: 6.7 UG/L (ref 5–15)
TME LAST DOSE: NORMAL H
TOXIC GRANULES BLD QL SMEAR: PRESENT
TRANSFUSION STATUS PATIENT QL: NORMAL
TRANSFUSION STATUS PATIENT QL: NORMAL
WBC # BLD AUTO: 3.1 10E9/L (ref 4–11)

## 2021-03-13 PROCEDURE — 250N000009 HC RX 250: Performed by: PEDIATRICS

## 2021-03-13 PROCEDURE — 86850 RBC ANTIBODY SCREEN: CPT | Performed by: PEDIATRICS

## 2021-03-13 PROCEDURE — 250N000013 HC RX MED GY IP 250 OP 250 PS 637: Performed by: STUDENT IN AN ORGANIZED HEALTH CARE EDUCATION/TRAINING PROGRAM

## 2021-03-13 PROCEDURE — 80048 BASIC METABOLIC PNL TOTAL CA: CPT | Performed by: NURSE PRACTITIONER

## 2021-03-13 PROCEDURE — 99233 SBSQ HOSP IP/OBS HIGH 50: CPT | Performed by: PEDIATRICS

## 2021-03-13 PROCEDURE — 250N000011 HC RX IP 250 OP 636: Performed by: NURSE PRACTITIONER

## 2021-03-13 PROCEDURE — 86901 BLOOD TYPING SEROLOGIC RH(D): CPT | Performed by: PEDIATRICS

## 2021-03-13 PROCEDURE — 85025 COMPLETE CBC W/AUTO DIFF WBC: CPT | Performed by: NURSE PRACTITIONER

## 2021-03-13 PROCEDURE — 250N000012 HC RX MED GY IP 250 OP 636 PS 637: Performed by: NURSE PRACTITIONER

## 2021-03-13 PROCEDURE — 250N000013 HC RX MED GY IP 250 OP 250 PS 637: Performed by: NURSE PRACTITIONER

## 2021-03-13 PROCEDURE — 206N000001 HC R&B BMT UMMC

## 2021-03-13 PROCEDURE — 86900 BLOOD TYPING SEROLOGIC ABO: CPT | Performed by: PEDIATRICS

## 2021-03-13 PROCEDURE — 80197 ASSAY OF TACROLIMUS: CPT | Performed by: NURSE PRACTITIONER

## 2021-03-13 PROCEDURE — 250N000009 HC RX 250: Performed by: NURSE PRACTITIONER

## 2021-03-13 PROCEDURE — P9037 PLATE PHERES LEUKOREDU IRRAD: HCPCS | Performed by: NURSE PRACTITIONER

## 2021-03-13 RX ADMIN — MYCOPHENOLATE MOFETIL 960 MG: 500 INJECTION, POWDER, LYOPHILIZED, FOR SOLUTION INTRAVENOUS at 05:22

## 2021-03-13 RX ADMIN — Medication 1 MG: at 20:15

## 2021-03-13 RX ADMIN — VANCOMYCIN HYDROCHLORIDE 125 MG: 125 CAPSULE ORAL at 17:52

## 2021-03-13 RX ADMIN — I.V. FAT EMULSION 240 ML: 20 EMULSION INTRAVENOUS at 20:24

## 2021-03-13 RX ADMIN — DIPHENHYDRAMINE HYDROCHLORIDE 50 MG: 50 INJECTION, SOLUTION INTRAMUSCULAR; INTRAVENOUS at 12:01

## 2021-03-13 RX ADMIN — ITRACONAZOLE 200 MG: 100 CAPSULE ORAL at 20:14

## 2021-03-13 RX ADMIN — MYCOPHENOLATE MOFETIL 960 MG: 500 INJECTION, POWDER, LYOPHILIZED, FOR SOLUTION INTRAVENOUS at 22:09

## 2021-03-13 RX ADMIN — TACROLIMUS 1 MG: 1 CAPSULE ORAL at 08:38

## 2021-03-13 RX ADMIN — Medication 1 MG: at 08:37

## 2021-03-13 RX ADMIN — PANTOPRAZOLE SODIUM 40 MG: 40 TABLET, DELAYED RELEASE ORAL at 20:15

## 2021-03-13 RX ADMIN — MYCOPHENOLATE MOFETIL 960 MG: 500 INJECTION, POWDER, LYOPHILIZED, FOR SOLUTION INTRAVENOUS at 14:22

## 2021-03-13 RX ADMIN — VANCOMYCIN HYDROCHLORIDE 125 MG: 125 CAPSULE ORAL at 15:36

## 2021-03-13 RX ADMIN — ITRACONAZOLE 200 MG: 100 CAPSULE ORAL at 08:37

## 2021-03-13 RX ADMIN — VALACYCLOVIR HYDROCHLORIDE 1000 MG: 500 TABLET, FILM COATED ORAL at 15:36

## 2021-03-13 RX ADMIN — SODIUM CHLORIDE: 234 INJECTION INTRAMUSCULAR; INTRAVENOUS; SUBCUTANEOUS at 20:23

## 2021-03-13 RX ADMIN — VALACYCLOVIR HYDROCHLORIDE 1000 MG: 500 TABLET, FILM COATED ORAL at 08:38

## 2021-03-13 RX ADMIN — ACETAMINOPHEN 500 MG: 500 TABLET, FILM COATED ORAL at 01:02

## 2021-03-13 RX ADMIN — URSODIOL 600 MG: 300 CAPSULE ORAL at 08:37

## 2021-03-13 RX ADMIN — VALACYCLOVIR HYDROCHLORIDE 1000 MG: 500 TABLET, FILM COATED ORAL at 20:15

## 2021-03-13 RX ADMIN — Medication 2.5 MG: at 10:27

## 2021-03-13 RX ADMIN — TACROLIMUS 1 MG: 1 CAPSULE ORAL at 20:14

## 2021-03-13 RX ADMIN — VANCOMYCIN HYDROCHLORIDE 125 MG: 125 CAPSULE ORAL at 08:38

## 2021-03-13 RX ADMIN — PANTOPRAZOLE SODIUM 40 MG: 40 TABLET, DELAYED RELEASE ORAL at 08:38

## 2021-03-13 RX ADMIN — Medication 2.5 MG: at 22:08

## 2021-03-13 RX ADMIN — VANCOMYCIN HYDROCHLORIDE 125 MG: 125 CAPSULE ORAL at 22:08

## 2021-03-13 RX ADMIN — Medication 315 MCG: at 20:12

## 2021-03-13 RX ADMIN — URSODIOL 600 MG: 300 CAPSULE ORAL at 20:14

## 2021-03-13 ASSESSMENT — MIFFLIN-ST. JEOR: SCORE: 1616.37

## 2021-03-13 NOTE — PLAN OF CARE
[id number]: Isael - Tmax 99.5. VSS. Lungs clear diminished in the bases on RA. Intermittent nausea, pain in abdomen, and emesis x 1, received benadryl x2. Reports pain at bone marrow biopsy site 3/10, declined intervention, received scheduled oxycodone. Declining lidocaine patch. Adequate UOP. No stool. No family present. Hourly rounding completed. Continue with POC.

## 2021-03-13 NOTE — PROGRESS NOTES
Pediatric BMT Daily Progress Note    Interval Events: No acute events overnight, platelets replaced without issue. Has some soreness at bone marrow biopsy site intermittently.     Review of Systems: Pertinent positives include those mentioned in interval events. A complete review of systems was performed and is otherwise negative.    Medications:  Please see MAR    Physical Exam:    Temp:  [98.3  F (36.8  C)-99.5  F (37.5  C)] 98.3  F (36.8  C)  Pulse:  [75-93] 77  Resp:  [16-22] 16  BP: ()/(54-79) 103/62  Cuff Mean (mmHg):  [74-81] 74  SpO2:  [97 %-99 %] 98 %     I/O last 3 completed shifts:  In: 3348 [P.O.:280; I.V.:746]  Out: 2725 [Urine:2315; Emesis/NG output:110; Stool:300]     GEN: talkative when in room, attentive. NAD. Mom at bedside.   HEENT: Head NC/AT, PERRL, EOMs intact, sclerae clear, nares patent, MMM.   CARD: RRR, no murmur.  RESP: Lungs clear to auscultation bilaterally. Diminished in the bases. Normal work of breathing.  ABD: Soft, NT, ND, bowel sounds present but slower no organomegaly or masses noted.   EXTREM: WWP, MAEE  SKIN: Dry skin, without rashes or bruising. Pressure dressing CDI at L sided BMBx site.  NEURO: No focal deficits  ACCESS: Double lumen burns, port (not accessed)    Labs: All reviewed with pertinent positives: WBC 3.1, ANC 1.2, Hgb 10.9, Plts 7k. BUN 19, Creat 0.61.     Assessment and Plan: Artemio is a 19 yo male with history of multiply relapsed ALL now day +25 s/p UCBT following MAC per 2013-09. Afebrile, pain and nausea well controlled. Counts recovering.     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 S/p preparative regimen per [id number] with Thiotepa (days -7 trough -6), Fludarabine (days -5 through -3),  Busulfan (days -5 through -3), ATG (days -4 through -2) followed by UCBT (2/16). Neutrophil recovery acheived day +20. Day +21 peripheral VNTRs (3/9) reveal 100% donor myeloid chimerism, CD3 pending.   - Bone marrow biopsy (3/11): flow negative, remaining tests pending     # Risk for GVHD:   - Continue Tacrolimus, goal 5-10.  Level today.   - Continue MMF-- levels WNL    FEN/Renal:  # Risk for malnutrition: intake minimal  - Regular diet as tolerated  - Dietician to follow  - Continue TPN/IL (cycled to 12 hours)     # Electrolyte abnormalities:    - Check daily electrolytes   - Replace/adjust in TPN as indicated     # Risk for renal dysfunction and fluid overload: GFR (1/28): 119 mls/min  - Monitor I/O's and daily weights    # Risk for aHUS/TA-TMA:    - Monitor LDH qMon/Thurs: 142 (3/8)  - Monitor urine protein/creatinine qTuesday: 0.46 (3/9)     Pulmonary:  # Risk for pulmonary insufficiency: No current concerns    Cardiovascular:  # Asymptomatic WPW syndrome (diagnosed 2018): no interventions to date. Most recent EKG (1/27). ECHO (1/26) normal, EF 64%. 24h Zio patch holter (1/27-1/28) revealed c/w WPW with no ventricular ectopies present. Cardiology following. BNP elevated 664 (2/16) likely attributed to fluid shifts per cardiology. BNP threshold of concern is >5000.  - Obtain weekly (Tu) BNP (WNL at 29 on 3/9) and troponin (WNL at 0.023 on 3/9)  - Follow up ECHO in 6 mos (7/2021)     # Risk for hypertension secondary to medications: normotensive currently, monitoring     Heme:   # Pancytopenia secondary to chemotherapy  - Transfuse for hemoglobin < 8 (experienced mild dizziness and malaise when hgb in 7s); platelet < 10K.   - Hx of transfusion reactions (hives): Premedicate with tylenol and benadryl prior to pRBCs and plts  - GCSF daily until ANC >/= 2500 x 3 consecutive days    # Recent hx of epistaxis: Completed 3 day course of afrin (2/23-2/25), plt transfusion parameter previously <20k.   - Continue Afrin  PRN    Infectious Disease:  # Neutropenic fever (3/8): s/p Cefepime  - Follow blood cultures (3/8) from CVC and port-a-cath: NGTD    # C.Difficile colitis (2/17): PO vanco (2/17-2/26), restarted due to increased frequency and volume of watery stool with abdominal cramping.   - Continue second 10d course of oral vanco (3/5-3/15)     # Risk for infection given immunocompromised status with need for prophylaxis:  - Viral (CMV/HSV sero positive): HD Acyclovir. Weekly CMV neg 3/9.  - Fungal: continue Itraconazole (started 3/11)  - PCP: Pentamidine given 2/15. Consider Bactrim next month if WBC criteria met (no hx of adverse reaction).  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 Next IgG due 3/20.     Past infections: C. Diff (November 2020)     GI:   # Reflux:  - BID protonix   - PRN Tums     # Nausea: s/p 3d course of emend x2 (2/16-2/18 and 2/21-2/23)   - Ativan BID-- transition to oral  - Benadryl prn     # Risk for VOD: most recent abd US (2/22) revealed stable, mild hepatomegaly (previously enrolled in elastography study)  - Ursodiol TID     Neuro:  # Mucositis/abdominal pain:   - Tylenol PRN  - Mugard QID PRN- home medication  -  oxycodone BID  - Encourage warm packs    # Left lateral shoulder/deltoid pain (3/6): c/w MSK pain, decreased ROM, pain on palpation. Much improved.   - continue PT   - Lidocaine patch discontinued - hasn't been using.       # History of significant spinal headaches: consider using Candelario needle with lumbar punctures    # CRS history. 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 No CRS, neurotoxicity or infectious complications from [place] CAR-T.     Derm:  # Barbie-area rash: resolved, s/p nystatin ointment    Fertility: Sperm collected previously collected for fertility preservation     Discharge Considerations: Expected lengths of  hospitalization for patients undergoing stem cell transplantation vary by primary diagnosis, conditioning regimen, graft source, and development of complications. A typical stay is 6 weeks.    The above plan of care was developed by and communicated to me by the Pediatric BMT attending physician, Sid Girard MD.    Judy ADAMSON, CNP  Pediatric Nurse Practitioner  Pediatric Blood and Marrow Transplant  [phone] - Pager  [phone] - BMT workroom  [phone] - BMT Clinic        BMT Attending Note:    Artemio was seen and evaluated by me today.     The significant interval history includes: Afebrile, vitals stable.  Overall doing well.    I have reviewed changes and data from the last 24 hours, including medications, laboratory results and vital signs.     I have formulated and discussed the plan with the BMT team. I discussed the course and plan with the patient/family and answered all of their questions to the best of my ability. I counseled them regarding the followin y/o M w/ h/o multiply relapsed Pre-B cell ALL (s/p MSD BMT, CART-19, CART-22) in CR3 here for consolidation of remission with MAC (FluBuTT ATG) conditioning, now S/P UCBT, engrafted, at risk for GVHD, at high risk for opportunistic infection, C. difficile colitis, at risk for malnutrition, nausea, resolving mucositis pain, at risk for VOD, at risk for gastritis, WPW, shoulder pain of unclear etiology-now resolved and tolerating ativan wean. Anticipatory guidance re: other potential transplant related complications and anticipated discharge.     My care coordination activities today include oversight of planned lab studies, oversight of medication changes and discussion with BMT team-members.    My total floor time today was greater than 35 minutes, at least 50% of which was counseling and coordination of care.    Sid Girard MD    Pediatric Blood and Marrow Transplant   [facility]  Pager:  [phone]    Patient Active Problem List   Diagnosis     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)     Aaron-Parkinson-White (WPW) syndrome     Bone marrow transplant candidate     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)     At risk for infection     S/P allogeneic bone marrow transplant (H)     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     Examination of participant or control in clinical research

## 2021-03-13 NOTE — PLAN OF CARE
Afebrile, tmax 99.2. VSS. Platelets given, premeded with tylenol and benadryl, tolerated well with no transfusion reaction. Complaining of 4/10 pain at BMB site. Denies nausea. UO adequate. No stool overnight. No family at bedside. Will continue to monitor and continue with POC.

## 2021-03-14 LAB
ANION GAP SERPL CALCULATED.3IONS-SCNC: 6 MMOL/L (ref 3–14)
BACTERIA SPEC CULT: NO GROWTH
BASOPHILS # BLD AUTO: 0 10E9/L (ref 0–0.2)
BASOPHILS NFR BLD AUTO: 0 %
BLD PROD TYP BPU: NORMAL
BLD PROD TYP BPU: NORMAL
BLD UNIT ID BPU: 0
BLOOD PRODUCT CODE: NORMAL
BPU ID: NORMAL
BUN SERPL-MCNC: 20 MG/DL (ref 7–30)
CALCIUM SERPL-MCNC: 8.8 MG/DL (ref 8.5–10.1)
CHLORIDE SERPL-SCNC: 105 MMOL/L (ref 94–109)
CO2 SERPL-SCNC: 24 MMOL/L (ref 20–32)
CREAT SERPL-MCNC: 0.61 MG/DL (ref 0.66–1.25)
DIFFERENTIAL METHOD BLD: ABNORMAL
EOSINOPHIL # BLD AUTO: 0 10E9/L (ref 0–0.7)
EOSINOPHIL NFR BLD AUTO: 0 %
ERYTHROCYTE [DISTWIDTH] IN BLOOD BY AUTOMATED COUNT: 11.9 % (ref 10–15)
GFR SERPL CREATININE-BSD FRML MDRD: >90 ML/MIN/{1.73_M2}
GLUCOSE SERPL-MCNC: 169 MG/DL (ref 70–99)
HCT VFR BLD AUTO: 31.5 % (ref 40–53)
HGB BLD-MCNC: 10.8 G/DL (ref 13.3–17.7)
LYMPHOCYTES # BLD AUTO: 0.5 10E9/L (ref 0.8–5.3)
LYMPHOCYTES NFR BLD AUTO: 14.3 %
Lab: NORMAL
MCH RBC QN AUTO: 29.8 PG (ref 26.5–33)
MCHC RBC AUTO-ENTMCNC: 34.3 G/DL (ref 31.5–36.5)
MCV RBC AUTO: 87 FL (ref 78–100)
METAMYELOCYTES # BLD: 0 10E9/L
METAMYELOCYTES NFR BLD MANUAL: 0.9 %
MONOCYTES # BLD AUTO: 0.9 10E9/L (ref 0–1.3)
MONOCYTES NFR BLD AUTO: 23.2 %
NEUTROPHILS # BLD AUTO: 2.3 10E9/L (ref 1.6–8.3)
NEUTROPHILS NFR BLD AUTO: 61.6 %
NUM BPU REQUESTED: 1
PLATELET # BLD AUTO: 10 10E9/L (ref 150–450)
PLATELET # BLD EST: ABNORMAL 10*3/UL
POIKILOCYTOSIS BLD QL SMEAR: SLIGHT
POTASSIUM SERPL-SCNC: 3.8 MMOL/L (ref 3.4–5.3)
RBC # BLD AUTO: 3.62 10E12/L (ref 4.4–5.9)
SODIUM SERPL-SCNC: 135 MMOL/L (ref 133–144)
SPECIMEN SOURCE: NORMAL
TOXIC GRANULES BLD QL SMEAR: PRESENT
TRANSFUSION STATUS PATIENT QL: NORMAL
TRANSFUSION STATUS PATIENT QL: NORMAL
WBC # BLD AUTO: 3.7 10E9/L (ref 4–11)

## 2021-03-14 PROCEDURE — 250N000013 HC RX MED GY IP 250 OP 250 PS 637: Performed by: NURSE PRACTITIONER

## 2021-03-14 PROCEDURE — 250N000011 HC RX IP 250 OP 636: Performed by: NURSE PRACTITIONER

## 2021-03-14 PROCEDURE — 250N000013 HC RX MED GY IP 250 OP 250 PS 637: Performed by: STUDENT IN AN ORGANIZED HEALTH CARE EDUCATION/TRAINING PROGRAM

## 2021-03-14 PROCEDURE — 85025 COMPLETE CBC W/AUTO DIFF WBC: CPT | Performed by: NURSE PRACTITIONER

## 2021-03-14 PROCEDURE — 250N000009 HC RX 250: Performed by: PEDIATRICS

## 2021-03-14 PROCEDURE — 99233 SBSQ HOSP IP/OBS HIGH 50: CPT | Performed by: PEDIATRICS

## 2021-03-14 PROCEDURE — 80048 BASIC METABOLIC PNL TOTAL CA: CPT | Performed by: NURSE PRACTITIONER

## 2021-03-14 PROCEDURE — 206N000001 HC R&B BMT UMMC

## 2021-03-14 PROCEDURE — P9037 PLATE PHERES LEUKOREDU IRRAD: HCPCS | Performed by: NURSE PRACTITIONER

## 2021-03-14 PROCEDURE — 250N000012 HC RX MED GY IP 250 OP 636 PS 637: Performed by: NURSE PRACTITIONER

## 2021-03-14 PROCEDURE — 85018 HEMOGLOBIN: CPT | Performed by: NURSE PRACTITIONER

## 2021-03-14 PROCEDURE — 85049 AUTOMATED PLATELET COUNT: CPT | Performed by: NURSE PRACTITIONER

## 2021-03-14 PROCEDURE — 250N000009 HC RX 250: Performed by: NURSE PRACTITIONER

## 2021-03-14 RX ORDER — OXYCODONE HYDROCHLORIDE 5 MG/1
5 TABLET ORAL DAILY PRN
Status: DISCONTINUED | OUTPATIENT
Start: 2021-03-14 | End: 2021-03-15 | Stop reason: HOSPADM

## 2021-03-14 RX ADMIN — Medication 1 MG: at 09:24

## 2021-03-14 RX ADMIN — SODIUM CHLORIDE: 234 INJECTION INTRAMUSCULAR; INTRAVENOUS; SUBCUTANEOUS at 20:53

## 2021-03-14 RX ADMIN — URSODIOL 600 MG: 300 CAPSULE ORAL at 22:27

## 2021-03-14 RX ADMIN — MYCOPHENOLATE MOFETIL 960 MG: 500 INJECTION, POWDER, LYOPHILIZED, FOR SOLUTION INTRAVENOUS at 22:27

## 2021-03-14 RX ADMIN — URSODIOL 600 MG: 300 CAPSULE ORAL at 13:39

## 2021-03-14 RX ADMIN — MYCOPHENOLATE MOFETIL 960 MG: 500 INJECTION, POWDER, LYOPHILIZED, FOR SOLUTION INTRAVENOUS at 13:39

## 2021-03-14 RX ADMIN — VANCOMYCIN HYDROCHLORIDE 125 MG: 125 CAPSULE ORAL at 09:24

## 2021-03-14 RX ADMIN — Medication 1 MG: at 21:08

## 2021-03-14 RX ADMIN — Medication 315 MCG: at 20:50

## 2021-03-14 RX ADMIN — ITRACONAZOLE 200 MG: 100 CAPSULE ORAL at 09:24

## 2021-03-14 RX ADMIN — VALACYCLOVIR HYDROCHLORIDE 1000 MG: 500 TABLET, FILM COATED ORAL at 09:26

## 2021-03-14 RX ADMIN — TACROLIMUS 1 MG: 1 CAPSULE ORAL at 09:24

## 2021-03-14 RX ADMIN — ITRACONAZOLE 200 MG: 100 CAPSULE ORAL at 20:58

## 2021-03-14 RX ADMIN — DIPHENHYDRAMINE HYDROCHLORIDE 50 MG: 50 INJECTION, SOLUTION INTRAMUSCULAR; INTRAVENOUS at 01:55

## 2021-03-14 RX ADMIN — VALACYCLOVIR HYDROCHLORIDE 1000 MG: 500 TABLET, FILM COATED ORAL at 13:39

## 2021-03-14 RX ADMIN — VANCOMYCIN HYDROCHLORIDE 125 MG: 125 CAPSULE ORAL at 15:28

## 2021-03-14 RX ADMIN — URSODIOL 600 MG: 300 CAPSULE ORAL at 09:26

## 2021-03-14 RX ADMIN — ACETAMINOPHEN 500 MG: 500 TABLET, FILM COATED ORAL at 01:55

## 2021-03-14 RX ADMIN — PANTOPRAZOLE SODIUM 40 MG: 40 TABLET, DELAYED RELEASE ORAL at 09:24

## 2021-03-14 RX ADMIN — MYCOPHENOLATE MOFETIL 960 MG: 500 INJECTION, POWDER, LYOPHILIZED, FOR SOLUTION INTRAVENOUS at 05:08

## 2021-03-14 RX ADMIN — Medication 2.5 MG: at 09:24

## 2021-03-14 RX ADMIN — VANCOMYCIN HYDROCHLORIDE 125 MG: 125 CAPSULE ORAL at 20:58

## 2021-03-14 RX ADMIN — I.V. FAT EMULSION 240 ML: 20 EMULSION INTRAVENOUS at 20:52

## 2021-03-14 RX ADMIN — DIPHENHYDRAMINE HYDROCHLORIDE 50 MG: 50 INJECTION, SOLUTION INTRAMUSCULAR; INTRAVENOUS at 23:25

## 2021-03-14 RX ADMIN — TACROLIMUS 1 MG: 1 CAPSULE ORAL at 20:58

## 2021-03-14 RX ADMIN — PANTOPRAZOLE SODIUM 40 MG: 40 TABLET, DELAYED RELEASE ORAL at 20:58

## 2021-03-14 RX ADMIN — VANCOMYCIN HYDROCHLORIDE 125 MG: 125 CAPSULE ORAL at 12:26

## 2021-03-14 RX ADMIN — VALACYCLOVIR HYDROCHLORIDE 1000 MG: 500 TABLET, FILM COATED ORAL at 20:58

## 2021-03-14 RX ADMIN — Medication 2.5 MG: at 22:26

## 2021-03-14 ASSESSMENT — MIFFLIN-ST. JEOR: SCORE: 1617.37

## 2021-03-14 NOTE — PROGRESS NOTES
Pediatric BMT Daily Progress Note    Interval Events: Afebrile, no complaints overnight. Platelets replaced, hemoglobin stable. ANC improved. Scheduled oxycodone with no prn doses needed.    Review of Systems: Pertinent positives include those mentioned in interval events. A complete review of systems was performed and is otherwise negative.    Medications:  Please see MAR    Physical Exam:    Temp:  [98.3  F (36.8  C)-99.2  F (37.3  C)] 98.4  F (36.9  C)  Pulse:  [] 97  Resp:  [16-22] 16  BP: ()/(54-79) 102/54  Cuff Mean (mmHg):  [89] 89  SpO2:  [97 %-99 %] 99 %     I/O last 3 completed shifts:  In: 3739.3 [P.O.:140; I.V.:875]  Out: 2596 [Urine:2250; Emesis/NG output:300; Stool:46]     GEN: talkative when in room, attentive. NAD. Mom at bedside.   HEENT: Head NC/AT, PERRL, EOMs intact, sclerae clear, nares patent, MMM.   CARD: RRR, no murmur.  RESP: Lungs clear to auscultation bilaterally. Diminished in the bases. Normal work of breathing.  ABD: Soft, NT, ND  EXTREM: WWP, MAEE  SKIN: Dry skin, without rashes or bruising. Pressure dressing CDI at L sided BMBx site.  NEURO: No focal deficits  ACCESS: Double lumen burns, port (not accessed)    Labs:  WBC 3.7, ANC 2.3, Hgb 10.8, Plts 10k. BUN 20, Creat 0.61.     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     Afebrile, pain and nausea well controlled. Counts recovering.     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 S/p preparative regimen per MT2013-09 with Thiotepa (days -7 trough -6), Fludarabine (days -5 through -3), Busulfan (days -5 through -3), ATG (days -4 through -2) followed by UCBT (2/16).  Neutrophil  recovery acheived day +20. Day +21 peripheral VNTRs (3/9) reveal 100% donor myeloid chimerism, CD3 pending.   - Bone marrow biopsy (3/11): flow negative, remaining tests pending     # Risk for GVHD:   - Continue Tacrolimus, goal 5-10.  Level 6.7 on 3/13.   - Continue MMF-- levels WNL    FEN/Renal:  # Risk for malnutrition: intake minimal  - Continue TPN/IL (cycled to 12 hours)   - Regular diet as tolerated  - Dietician to follow    # Electrolyte abnormalities:    - Check daily electrolytes   - Replace/adjust in TPN as indicated     # Risk for renal dysfunction and fluid overload: GFR (1/28): 119 mls/min  - Monitor I/O's and daily weights    # Risk for aHUS/TA-TMA:    - Monitor LDH qMon/Thurs: 155 (3/11)  - Monitor urine protein/creatinine qTuesday: 0.46 (3/9)     Pulmonary:  # Risk for pulmonary insufficiency: No current concerns    Cardiovascular:  # Asymptomatic WPW syndrome (diagnosed 2018): no interventions to date. Most recent EKG (1/27). ECHO (1/26) normal, EF 64%. 24h Zio patch holter (1/27-1/28) revealed c/w WPW with no ventricular ectopies present. Cardiology following. BNP elevated 664 (2/16) likely attributed to fluid shifts per cardiology. BNP threshold of concern is >5000.  - Obtain weekly (Tu) BNP (WNL at 29 on 3/9) and troponin (WNL at 0.023 on 3/9)  - Follow up ECHO in 6 mos (7/2021)     # Risk for hypertension secondary to medications: normotensive currently, monitoring     Heme:   # Pancytopenia secondary to chemotherapy  - Transfuse for hemoglobin < 8 (experienced mild dizziness and malaise when hgb in 7s); platelet < 10K.   - Hx of transfusion reactions (hives): Premedicate with tylenol and benadryl prior to pRBCs and plts  - GCSF daily until ANC >/= 2500 x 3 consecutive days    # Recent hx of epistaxis: resolved.    Infectious Disease:  # Neutropenic fever (3/8): s/p Cefepime  - Follow blood cultures (3/8) from CVC and port-a-cath: NGTD    # C.Difficile colitis (2/17): PO vanco (2/17-2/26),  restarted due to increased frequency and volume of watery stool with abdominal cramping.   - Continue second 10d course of oral vanco (3/5-3/15)     # Risk for infection given immunocompromised status with need for prophylaxis:  - Viral (CMV/HSV sero positive): HD Acyclovir. Weekly CMV neg 3/9.  - Fungal: continue Itraconazole (started 3/11)  - PCP: Pentamidine given 2/15. Consider Bactrim next month if WBC criteria met (no hx of adverse reaction).  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 Next IgG due 3/20.     Past infections: C. Diff (November 2020)     GI:   # Reflux:  - BID protonix   - PRN Tums     # Nausea: s/p 3d course of emend x2 (2/16-2/18 and 2/21-2/23)   - Ativan BID  - Benadryl prn     # Risk for VOD: most recent abd US (2/22) revealed stable, mild hepatomegaly (previously enrolled in elastography study)  - Ursodiol TID     Neuro:  # Mucositis/abdominal pain: Denies pain  - Tylenol PRN  - Mugard QID PRN- home medication  -  Oxycodone to prn 3/14.    # Left lateral shoulder/deltoid pain (3/6): c/w MSK pain, decreased ROM, pain on palpation. Much improved.   - continue PT      # History of significant spinal headaches: consider using Candelario needle with lumbar punctures    # CRS history. 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 No CRS, neurotoxicity or infectious complications from [place] CAR-T.     Fertility: Sperm collected previously collected for fertility preservation     Discharge Considerations: Expected lengths of hospitalization for patients undergoing stem cell transplantation vary by primary diagnosis, conditioning regimen, graft source, and development of complications. A typical stay is 6 weeks.    The above plan of care was developed by and communicated to me by the Pediatric BMT attending physician, Sid Girard MD.    [facility]  BENJA Workman  [facility]  Pediatric Blood and Marrow Transplant      BMT Attending Note:    Artemio was seen and evaluated by me today.     The significant interval history includes: Afebrile, vitals stable. Doing overall well. TPN cycled to 12 hours.    I have reviewed changes and data from the last 24 hours, including medications, laboratory results and vital signs.     I have formulated and discussed the plan with the BMT team. I discussed the course and plan with the patient/family and answered all of their questions to the best of my ability. I counseled them regarding the followin y/o M w/ h/o multiply relapsed Pre-B cell ALL (s/p MSD BMT, CART-19, CART-22) in CR3 here for consolidation of remission with MAC (FluBuTT ATG) conditioning, now S/P UCBT, engrafted, at risk for GVHD, at high risk for opportunistic infection, C. difficile colitis, at risk for malnutrition, nausea, resolving mucositis pain, at risk for VOD, at risk for gastritis, WPW, shoulder pain of unclear etiology-now resolved and tolerating ativan wean. Anticipatory guidance re: other potential transplant related complications and anticipated discharge.     My care coordination activities today include oversight of planned lab studies, oversight of medication changes and discussion with BMT team-members.    My total floor time today was greater than 35 minutes, at least 50% of which was counseling and coordination of care.    Sid Girard MD    Pediatric Blood and Marrow Transplant   [facility]  Pager: [phone]    Patient Active Problem List   Diagnosis     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)     Aaron-Parkinson-White (WPW) syndrome     Bone marrow transplant candidate     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)     At risk for infection     S/P allogeneic bone marrow transplant (H)     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)      Fever     Neutropenic fever (H)     Examination of participant or control in clinical research

## 2021-03-14 NOTE — PLAN OF CARE
8153-1202: Afebrile. VSS. Platelets given overnight, premeded with tylenol and benadryl, tolerated well with no transfusion reaction. Denies pain and nausea. Urine continues to be clear travis, UO adequate. No stool. No family at bedside. Will continue to monitor and continue with POC.

## 2021-03-14 NOTE — PLAN OF CARE
2845-4388: Isael  - Afebrile. VSS. Lungs clear, diminished in the bases. No PRNs/replacements given. No N/V or pain reported. Minimal UOP, travis colored. Stool x1. Had friends visit from outside window, Isael in good spirits while visiting with them. Mom here for part of evening. Hourly rounding completed. Continue with POC.

## 2021-03-14 NOTE — PLAN OF CARE
Artemio afebrile, VS stable. LSC. Denied pain/n/v. Good UOP-due to void. No stool noted. No appetite. In bed most of shift. Hourly rounding completed, continue with POC.

## 2021-03-15 ENCOUNTER — HOME INFUSION (PRE-WILLOW HOME INFUSION) (OUTPATIENT)
Dept: PHARMACY | Facility: CLINIC | Age: 21
End: 2021-03-15

## 2021-03-15 ENCOUNTER — PATIENT OUTREACH (OUTPATIENT)
Dept: CARE COORDINATION | Facility: CLINIC | Age: 21
End: 2021-03-15

## 2021-03-15 VITALS
OXYGEN SATURATION: 98 % | TEMPERATURE: 98.7 F | SYSTOLIC BLOOD PRESSURE: 104 MMHG | RESPIRATION RATE: 18 BRPM | BODY MASS INDEX: 21.32 KG/M2 | DIASTOLIC BLOOD PRESSURE: 60 MMHG | HEART RATE: 96 BPM | WEIGHT: 140.65 LBS | HEIGHT: 68 IN

## 2021-03-15 DIAGNOSIS — C91.02 ACUTE LYMPHOBLASTIC LEUKEMIA (ALL) IN RELAPSE (H): ICD-10-CM

## 2021-03-15 LAB
ALBUMIN SERPL-MCNC: 3.2 G/DL (ref 3.4–5)
ALP SERPL-CCNC: 90 U/L (ref 40–150)
ALT SERPL W P-5'-P-CCNC: 40 U/L (ref 0–70)
ANION GAP SERPL CALCULATED.3IONS-SCNC: 8 MMOL/L (ref 3–14)
ANISOCYTOSIS BLD QL SMEAR: SLIGHT
AST SERPL W P-5'-P-CCNC: 24 U/L (ref 0–45)
BASOPHILS # BLD AUTO: 0 10E9/L (ref 0–0.2)
BASOPHILS NFR BLD AUTO: 0 %
BILIRUB DIRECT SERPL-MCNC: 0.2 MG/DL (ref 0–0.2)
BILIRUB SERPL-MCNC: 0.6 MG/DL (ref 0.2–1.3)
BUN SERPL-MCNC: 20 MG/DL (ref 7–30)
CALCIUM SERPL-MCNC: 9.2 MG/DL (ref 8.5–10.1)
CHLORIDE SERPL-SCNC: 109 MMOL/L (ref 94–109)
CO2 SERPL-SCNC: 20 MMOL/L (ref 20–32)
COPATH REPORT: NORMAL
CREAT SERPL-MCNC: 0.63 MG/DL (ref 0.66–1.25)
DIFFERENTIAL METHOD BLD: ABNORMAL
EOSINOPHIL # BLD AUTO: 0 10E9/L (ref 0–0.7)
EOSINOPHIL NFR BLD AUTO: 0 %
ERYTHROCYTE [DISTWIDTH] IN BLOOD BY AUTOMATED COUNT: 11.9 % (ref 10–15)
GFR SERPL CREATININE-BSD FRML MDRD: >90 ML/MIN/{1.73_M2}
GLUCOSE SERPL-MCNC: 122 MG/DL (ref 70–99)
HCT VFR BLD AUTO: 30.4 % (ref 40–53)
HGB BLD-MCNC: 10.6 G/DL (ref 13.3–17.7)
LDH SERPL L TO P-CCNC: 177 U/L (ref 85–227)
LYMPHOCYTES # BLD AUTO: 0.6 10E9/L (ref 0.8–5.3)
LYMPHOCYTES NFR BLD AUTO: 13.9 %
MAGNESIUM SERPL-MCNC: 1.6 MG/DL (ref 1.6–2.3)
MCH RBC QN AUTO: 30.2 PG (ref 26.5–33)
MCHC RBC AUTO-ENTMCNC: 34.9 G/DL (ref 31.5–36.5)
MCV RBC AUTO: 87 FL (ref 78–100)
MICROCYTES BLD QL SMEAR: PRESENT
MONOCYTES # BLD AUTO: 0.9 10E9/L (ref 0–1.3)
MONOCYTES NFR BLD AUTO: 23.1 %
NEUTROPHILS # BLD AUTO: 2.5 10E9/L (ref 1.6–8.3)
NEUTROPHILS NFR BLD AUTO: 63 %
PHOSPHATE SERPL-MCNC: 4.2 MG/DL (ref 2.5–4.5)
PLATELET # BLD AUTO: 13 10E9/L (ref 150–450)
PLATELET # BLD EST: ABNORMAL 10*3/UL
POTASSIUM SERPL-SCNC: 3.8 MMOL/L (ref 3.4–5.3)
PREALB SERPL IA-MCNC: 22 MG/DL (ref 15–45)
PROT SERPL-MCNC: 6.6 G/DL (ref 6.8–8.8)
RBC # BLD AUTO: 3.51 10E12/L (ref 4.4–5.9)
SODIUM SERPL-SCNC: 137 MMOL/L (ref 133–144)
TACROLIMUS BLD-MCNC: 8.8 UG/L (ref 5–15)
TME LAST DOSE: NORMAL H
TRIGL SERPL-MCNC: 136 MG/DL
WBC # BLD AUTO: 4 10E9/L (ref 4–11)

## 2021-03-15 PROCEDURE — 250N000013 HC RX MED GY IP 250 OP 250 PS 637: Performed by: NURSE PRACTITIONER

## 2021-03-15 PROCEDURE — 84478 ASSAY OF TRIGLYCERIDES: CPT | Performed by: NURSE PRACTITIONER

## 2021-03-15 PROCEDURE — 250N000011 HC RX IP 250 OP 636: Performed by: NURSE PRACTITIONER

## 2021-03-15 PROCEDURE — 99239 HOSP IP/OBS DSCHRG MGMT >30: CPT | Performed by: PEDIATRICS

## 2021-03-15 PROCEDURE — 80053 COMPREHEN METABOLIC PANEL: CPT | Performed by: NURSE PRACTITIONER

## 2021-03-15 PROCEDURE — 250N000009 HC RX 250: Performed by: NURSE PRACTITIONER

## 2021-03-15 PROCEDURE — 250N000013 HC RX MED GY IP 250 OP 250 PS 637: Performed by: STUDENT IN AN ORGANIZED HEALTH CARE EDUCATION/TRAINING PROGRAM

## 2021-03-15 PROCEDURE — 84134 ASSAY OF PREALBUMIN: CPT | Performed by: NURSE PRACTITIONER

## 2021-03-15 PROCEDURE — 83735 ASSAY OF MAGNESIUM: CPT | Performed by: NURSE PRACTITIONER

## 2021-03-15 PROCEDURE — 84100 ASSAY OF PHOSPHORUS: CPT | Performed by: NURSE PRACTITIONER

## 2021-03-15 PROCEDURE — 80197 ASSAY OF TACROLIMUS: CPT | Performed by: NURSE PRACTITIONER

## 2021-03-15 PROCEDURE — 83615 LACTATE (LD) (LDH) ENZYME: CPT | Performed by: NURSE PRACTITIONER

## 2021-03-15 PROCEDURE — 250N000012 HC RX MED GY IP 250 OP 636 PS 637: Performed by: NURSE PRACTITIONER

## 2021-03-15 PROCEDURE — 85025 COMPLETE CBC W/AUTO DIFF WBC: CPT | Performed by: NURSE PRACTITIONER

## 2021-03-15 PROCEDURE — 82248 BILIRUBIN DIRECT: CPT | Performed by: NURSE PRACTITIONER

## 2021-03-15 RX ORDER — TACROLIMUS 0.5 MG/1
CAPSULE ORAL
Qty: 60 CAPSULE | Refills: 3
Start: 2021-03-15 | End: 2021-03-17

## 2021-03-15 RX ORDER — ACETAMINOPHEN 325 MG/1
650 TABLET ORAL ONCE
Status: CANCELLED
Start: 2021-03-15 | End: 2021-03-15

## 2021-03-15 RX ORDER — TACROLIMUS 1 MG/1
CAPSULE ORAL
Qty: 60 CAPSULE | Refills: 3
Start: 2021-03-15 | End: 2021-03-17

## 2021-03-15 RX ORDER — TACROLIMUS 1 MG/1
1 CAPSULE ORAL 2 TIMES DAILY
Qty: 60 CAPSULE | Refills: 3 | Status: SHIPPED | OUTPATIENT
Start: 2021-03-15 | End: 2021-03-15

## 2021-03-15 RX ORDER — TACROLIMUS 0.5 MG/1
CAPSULE ORAL
Qty: 60 CAPSULE | Refills: 3 | Status: SHIPPED | OUTPATIENT
Start: 2021-03-15 | End: 2021-03-15

## 2021-03-15 RX ORDER — DIPHENHYDRAMINE HYDROCHLORIDE 50 MG/ML
50 INJECTION INTRAMUSCULAR; INTRAVENOUS EVERY 6 HOURS PRN
Status: CANCELLED
Start: 2021-03-15

## 2021-03-15 RX ADMIN — VALACYCLOVIR HYDROCHLORIDE 1000 MG: 500 TABLET, FILM COATED ORAL at 08:24

## 2021-03-15 RX ADMIN — ITRACONAZOLE 200 MG: 100 CAPSULE ORAL at 08:24

## 2021-03-15 RX ADMIN — Medication 315 MCG: at 09:46

## 2021-03-15 RX ADMIN — MYCOPHENOLATE MOFETIL 960 MG: 500 INJECTION, POWDER, LYOPHILIZED, FOR SOLUTION INTRAVENOUS at 05:54

## 2021-03-15 RX ADMIN — PANTOPRAZOLE SODIUM 40 MG: 40 TABLET, DELAYED RELEASE ORAL at 08:24

## 2021-03-15 RX ADMIN — Medication 1 MG: at 08:24

## 2021-03-15 RX ADMIN — TACROLIMUS 1 MG: 1 CAPSULE ORAL at 08:24

## 2021-03-15 RX ADMIN — VANCOMYCIN HYDROCHLORIDE 125 MG: 125 CAPSULE ORAL at 08:24

## 2021-03-15 RX ADMIN — URSODIOL 600 MG: 300 CAPSULE ORAL at 08:24

## 2021-03-15 RX ADMIN — Medication 2.5 MG: at 09:21

## 2021-03-15 NOTE — PHARMACY-IMMUNOSUPPRESSION MONITORING
Tacrolimus Monitoring Note     D:  Current tacrolimus dose:  1 mg PO BID   Tacro level:  8.8 ug/L (drawn 11.6 hours post dose on an ideal 12 hour interval).    Goals for therapy = 5-10 ug/L   A:  Current trough level is within the desired range.  Drug interactions include itraconazole started 3/11.  Tacrolimus levels have been trending up, expect levels to continue to rise with initiation of itraconazole.   P:  Empirically decrease tacrolimus to 1 mg po qam and 0.5 mg po qpm.  Discussed recommendations with Jacqueline Shin NP.  Recheck trough level on Wednesday 3/17.  Pharmacy team will continue to follow.    Thank you!  Estela Sotelo, PharmD, BCPS

## 2021-03-15 NOTE — PROGRESS NOTES
BMT Teaching Flowsheet    Artemio Nino is a 20-year-old with a history of ALL who is status post 7/8 UCB transplant.    Teaching Topic: First discharge post-transplant education    Person(s) involved in teaching: Patient, Mother and Father    BMT Physician: Viky Zavala MD  Outpatient Nurse Coordinator: Daria Jara RN  Inpatient Nurse Coordinator: Judy Quintanilla RN    Home Infusion: [facility]  Education Classes Completed: CVC and TPN  DME Training Completed: N/A    Motivation Level  Asks Questions: Yes  Eager to Learn: Yes  Cooperative: Yes  Receptive (willing/able to accept information): Yes  Any cultural factors/Jainism beliefs that may influence understanding or compliance? No    Family demonstrates understanding of the following:   - Reason for the discharge teaching and treatment plan: Yes  - Knowledge of proper use of medications and conditions for which they are ordered (with special attention to potential side effects or drug interactions): Yes  - Which situations necessitate calling provider and whom to contact: Yes. Numbers provided:   - BMT Fellow On-Call: [phone]   - [facility]: [phone]   - BMT Triage: [phone]  - Proper use and care of (medical equipment, care aids, etc.): Yes  - Pain management techniques: Yes  - How and/when to access resources: Yes    Infection Control  The family was educated on:  - Hand hygiene: Yes   - Mask guidelines: Yes   - Places and activities to avoid: Yes   - Central venous catheter care: Yes  - Signs and symptoms of infection: Yes  - Surgical procedure site care: NA  - Wound care: NA    Instructional Materials Used/Given: When to Call for Help and After You Leave the Hospital materials provided.     Teaching Concerns Addressed: All concerns addressed. Family familiar with discharge process and guidelines. Teach-back provided.     Plan: Plan to follow-up in clinic tomorrow. Outpatient team to continue to educate, as needed.

## 2021-03-15 NOTE — SUMMARY OF CARE
BMT Pediatric Summary of Care    March 15, 2021 8:38 AM  Artemio Nino  MRN: [id number]    Discharge Date: 03/15/21    BMT Primary Physician: Viky Zavala MD    BMT Nurse Coordinator: Daria Jara RN    Discharge Diagnosis: S/P BMT    Discharge To: home    Activity: Allogeneic precautions (handwashing, mask, avoidance of crowds)      Catheter Care: Galvan    Vascular Access Device Protocol Per Policy  Supplies through Home Infusion (Please supply central line dressing kits for weekly dressing changes).  [facility]  Fax: [phone]  Ph: [phone]     IV Medications through home infusion: None    Nutrition: TPN/IL-see separate orders for formula    Blood Transfusions:  Transfuse if Hemoglobin < or equal 8 mg/dL  Red Blood Cell Order: 2 units, irradiated and leukoreduced   Transfuse if Platelet count < or equal 10 uL  Platelet order: 1 adult dose, irradiated and leukoreduced  Transfusion Pre-meds: None    Outpatient Pharmacy:  G-CSF to be given in clinic: 5 mcg/kg on 3/16 and PRN for ANC <1000    Laboratory Tests:  At next clinic appointment (date: 3/16/21)  Hemogram (CBC) differential, platelet count  Basic Metabolic Panel    Support Services:  Physical Therapy Consult  (Evaluate and Treat)-separate order must be sent to department    Appointments:   BMT Clinic (date, time, provider):   - Tuesday, 3/16: 11:00 labs, GCSF and possible platelets, and exam with Janee Olmos MD  - Wednesday, 3/17:  11:30 labs and possible platelets, exam with JOHN Chen NP

## 2021-03-15 NOTE — PLAN OF CARE
6511-6222: Isael - Afebrile. VSS. Lungs clear on RA. No PRNs/replacements needed. No N/V or pain. Flushing urine and stool. Hourly rounding completed. Continue with POC.

## 2021-03-15 NOTE — PHARMACY
TPN note for discharge:  Artemio's TPN formula, labs, and nutritional status were reviewed today.    Based on the results the following changes are recommended to the TPN:  See below.    This was discussed with Jacqueline Shin NP and communicated to [facility].    Artemio will be in clinic  tomorrow 3/16 for labs and reassessment, please send 1 bag for tonight.    The following reflects the new TPN formula.  Dosing Weight:  64.5 kg  Volume:  1800 mL, cycled over 12 hours  Protein:  100 gm  Dextrose:  236 gm  Lipids:  240 mL/day    Sodium:  80 mEq/day  Potassium:  30 mEq/day  Calcium:  10 mEq/day  Magnesium:  45 mEq/day  Phosphorus:  zero  Chloride:Acetate ratio:  1:2  Trace elements with Selenium:  standard  Multivitamins:  standard  Vitamin K:  10 mg    Pharmacy will continue to follow.  Estela Sotelo, PharmD, BCPS  [phone]

## 2021-03-15 NOTE — PHARMACY - DISCHARGE MEDICATION RECONCILIATION AND EDUCATION
Discharge medication review for this patient completed.  Pharmacist provided medication teaching for discharge with a focus on new medications/dose changes.  The discharge medication list was reviewed with Eboni Ulloa and the following points were discussed, as applicable: Name, description, purpose, dose/strength, strategies for giving medications to children, common side effects, food/medications to avoid, when to call MD and how to obtain refills.    Both were engaged during teaching and verbalized understanding. Other pertinent information from teaching includes: Provided Medactionplan with stephane.    Did not have medications in hand during teach due to finishing filling all.    The following medications were discussed:  Current Discharge Medication List      START taking these medications    Details   itraconazole (SPORANOX) 100 MG capsule Take 2 capsules (200 mg) by mouth 2 times daily  Qty: 120 capsule, Refills: 1    Associated Diagnoses: 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)      LORazepam (ATIVAN) 1 MG tablet Take 1 tablet (1 mg) by mouth 2 times daily  Qty: 60 tablet, Refills: 0    Associated Diagnoses: 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)      oxyCODONE (ROXICODONE) 5 MG tablet Take 1 tablet (5 mg) by mouth every 6 hours as needed for breakthrough pain  Qty: 30 tablet, Refills: 0    Associated Diagnoses: 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)      pantoprazole (PROTONIX) 40 MG EC tablet Take 1 tablet (40 mg) by mouth 2 times daily  Qty: 60 tablet, Refills: 0    Associated Diagnoses: 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)      sulfamethoxazole-trimethoprim (BACTRIM) 400-80 MG tablet Take 2 tablets by mouth Every Mon, Tues two times daily  Qty: 32 tablet, Refills: 1    Associated Diagnoses: At risk for infection; 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)      tacrolimus (GENERIC EQUIVALENT) 0.5 MG capsule Save for future dose changes  Qty: 60  capsule, Refills: 3    Associated Diagnoses: 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)      tacrolimus (GENERIC EQUIVALENT) 1 MG capsule Take 1 capsule (1 mg) by mouth 2 times daily  Qty: 60 capsule, Refills: 3    Associated Diagnoses: 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)         CONTINUE these medications which have CHANGED    Details   valACYclovir (VALTREX) 1000 mg tablet Take 1 tablet (1,000 mg) by mouth 3 times daily  Qty: 90 tablet, Refills: 1    Associated Diagnoses: 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)         CONTINUE these medications which have NOT CHANGED    Details   acetaminophen (TYLENOL) 325 MG tablet Take 650 mg by mouth every 6 hours as needed for mild pain      calcium carbonate (TUMS) 500 MG chewable tablet Take 1 chew tab by mouth 3 times daily as needed for heartburn         STOP taking these medications       omeprazole (PRILOSEC) 20 MG DR capsule Comments:   Reason for Stopping:         pentamidine Comments:   Reason for Stopping:

## 2021-03-15 NOTE — DISCHARGE INSTRUCTIONS
BMT Pediatric Summary of Care    March 15, 2021 8:38 AM  Artemio Nino  MRN: [id number]    Discharge Date: 03/15/21    BMT Primary Physician: Viky Zavala MD    BMT Nurse Coordinator: Daria Jara RN    Discharge Diagnosis: S/P BMT    Discharge To: home    Activity: Allogeneic precautions (handwashing, mask, avoidance of crowds)      Catheter Care: Galvan    Vascular Access Device Protocol Per Policy  Supplies through Home Infusion (Please supply central line dressing kits for weekly dressing changes).  [facility]  Fax: [phone]  Ph: [phone]     IV Medications through home infusion: None    Nutrition: TPN/IL-see separate orders for formula    Blood Transfusions:  Transfuse if Hemoglobin < or equal 8 mg/dL  Red Blood Cell Order: 2 units, irradiated and leukoreduced   Transfuse if Platelet count < or equal 10 uL  Platelet order: 1 adult dose, irradiated and leukoreduced  Transfusion Pre-meds: None    Outpatient Pharmacy:  G-CSF to be given in clinic: 5 mcg/kg on 3/16 and PRN for ANC <1000    Laboratory Tests:  At next clinic appointment (date: 3/16/21)  Hemogram (CBC) differential, platelet count  Basic Metabolic Panel    Support Services:  Physical Therapy Consult  (Evaluate and Treat)-separate order must be sent to department    Appointments:   BMT Clinic (date, time, provider):   - Tuesday, 3/16: 11:00 labs, GCSF and possible platelets, and exam with Janee Olmos MD  - Wednesday, 3/17:  11:30 labs and possible platelets, exam with JOHN Chen NP

## 2021-03-15 NOTE — PROGRESS NOTES
[id number]: Withdrawal of Consent     Artemio Nino has chosen to withdraw from the study. Specifically, the patient would like to:  Withdrawal from protocol-directed interventions only, e.g. subject refuses further treatment with the study drug, biologic, device, etc. Patient discussed this with primary MD Dr Haider and did not want to remain NPO for required ultrasounds. ARCHANA Senior was notified and patient was removed from study.   ALYSSA BARRERA RN          Form [id number] (Version 1)     Effective date: 01JUL2018     Next Review Date: 01JUL2020

## 2021-03-15 NOTE — PLAN OF CARE
Artemio discharged from U4 @1100. Discharge education completed with Mother, Father and Pt. All questions answered and appropriate.This shift Isael has been afebrile, VSS, LSC on RA. No complaints of pain or nausea. Good UOP. No stool. GCSF given prior to discharge. Lines hep locked and dressing due on 3/19. Platelets and GSCF to be given tomorrow in clinic.

## 2021-03-15 NOTE — PROGRESS NOTES
Infusion Therapy-[facility] Nurse Liaison-   Pt is expected to DC today to home. Patient has done home infusion in the past, CL flushing and TPN. Benefits relayed to Mom, MN[employer] 100% coverage. Educated to keep clamps clamped unless [place] is in use.  Educated to flush both lumens daily with Heparin and keep CDI. Mom was engaged, and states Isael is comfortable doing Home Infusion. Educ provided on  RN/RPH on call 24/7, phone number provided & encouraged to call [facility] for any questions, clarifications or problems. She verbalizes understanding of above. Education provided to listen for possible phone/VMs from [facility] Office nurse, to discuss delivery, and upcoming SNV time.   DC THERAPIES: DC Today, CLC by [facility],  TPN. (GCSF in clinic)  CVC: Non-Valved DL   FLUSHING: SASH  SNV: Required today after 5pm for TPN education for patient and Dad. Patient independent with flushing, and care of CL  DELIVERY/SUPPLIES: To Dads Address, no scale. Can leave on steps   NOTE: Pt will live with Dad in [place], [facility] updated with delivery address    Carline Boyer, [facility]-Nurse Liaison  [email or web address]  My Cell:  [phone]  M-F  [facility] OFFICE  24/7hrs  [phone]

## 2021-03-15 NOTE — PROGRESS NOTES
[facility]  Nurse Liaison    Received referral from==== [facility] for abx therapy.  Benefits verified, Pt has === plan, covered at ==100% . Spoke with patient by phone, due to Covid Precautions. Introduced home infusion services, review benefits and offer choice of providers.  Patient wishes ====to stay with [place] and has chosen [facility].  Provided info on [facility] Services, Including, brief Administration methods, RN visits, RPH/RN on call 24/7, supplies, and delivery process to ====[facility].  Educated on how to contact [facility]. Pt in agreement with plan and willing and able to learn. Pt stated they are will be comfortable doing infusion in home environment. ===Mom  Patient has not been to [facility], yet.  [facility] Liaison will continue to follow until DC for any changes or additional needs. This RN provided patient with I ==Phone number ==Brochure to ==Mother Patient.    ANTICIPATED DC THERAPIES:   CVC:DL Cole===PICC  ANTICIPATED DATE:   SNV:  DELIVERY/SUPPLIES: To [facility] ====  REGIONAL AGENCY: =====none, pt will be Pharmacy Only with [facility]  NOTE:       Carline Boyer, [facility]-Nurse Liaison  Cell:  [phone] Mon-Fri  [email or web address]  [facility]-24 hrs  [phone]

## 2021-03-15 NOTE — PLAN OF CARE
Afebrile. VSS. Lungs clear on RA. No c/o pain/nausea. Good UOP. No stool. No PRN's or replacements overnight. Hourly rounding done.

## 2021-03-15 NOTE — PROGRESS NOTES
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 Artemio was seen for a neuropsychological evaluation in the Pediatric Neuropsychology Clinic as part of his 3-year post-BMT follow-up.      Artemio was previously seen in our clinic in October of 2019. Results revealed broadly average functioning across domains. He demonstrated average overall intellectual functioning, attention and executive function, verbal and visual memory, visual-motor integration, and fine motor skills. No concerns regarding Artemio s overall emotional and behavioral well-being and adjustment were noted.     Updated History: Updated information was gathered via an interview with Artemio, review of available records, and parent and self-report questionnaires. Interested readers are directed to Artemio s medical records and previous neuropsychological evaluation report dated 10/29/2019 for complete family, medical, and developmental history.     As a review, prior to Artemio s cancer diagnosis, his medical history was largely uncomplicated. Family medical history is significant for heparin-induced thrombocytopenia and prostate cancer. Artemio was diagnosed with acute lymphoblastic leukemia (ALL) in February of 2019. Cytogenetic analysis showed IKZF1 deletion, which suggests Ph-like ALL. Molecular evaluation revelated a translocation indicative of JAK2 activation. Between 2/15/18 - 3/15/18, Artemio received bone marrow biopsy and lumbar puncture at [facility]. Intrathecal chemotherapy (i.e., [id number] and [id number] study) was  initiated. Chemotherapy agents included Methotrexate, Cyclophospamide/Etoposide, and Ruxolitinib. 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 His end of induction marrow on 3/15/18 showed 31% blasts per [facility], 8% by morphology and was determined have shown induction failure. Artemio received four days of total body irradiation (TBI) conditioning followed by matched sibling donor transplant on 11/2/18. He engrafted, with no signs of GVHD. Day +15 bone marrow biopsy showed 20-30% cellularity with trilineage hematopoesis, flow negative, 100% donor.     Head CT on 8/7/20 indicated no intracranial hemorrhage, mass effect, or midline shift. Gray/white matter differentiation in both cerebral hemispheres preserved. Ventricles proportionate to the cerebral sulci. Basal cisterns clear. Mild hypoattenuation within the periventricular and subcortical white matter within both cerebral hemispheres (nonspecific), which may represent posttreatment changes.     Following marrow relapse in June of 2020, Artemio completed maintenance chemotherapy and underwent lymphodepletion followed by CD19 chimeric antigen receptor T-cell (CAR-T) infusion on 7/27/20. CAR-T therapy was complicated by Grade III cytokine release syndrome (CRS), a systemic inflammatory response that can occur following antibody-based therapies, such as CAR-T and can, in severe cases, cause neurotoxicity (damage to the brain). He was treated with Tociluzimab, dopamine pressor support, and steroids. He continues to have absolute CD19 B-cells <1%. He has shown no signs of CRS or neurotoxicity. He is currently in deep remission and is negative for minimal residual disease (MRD), suggesting favorable long-term outcomes. He is currently pursuing curative treatment through a second bone marrow transplant, and he will begin his pre-transplant conditioning regimen on February 8th at [facility]. Artemio is currently prescribed Valacyclovir and Pentamidine (monthly).    Regarding his current functioning, Artemio shared that he has felt healthy overall. He described low energy secondary to his ongoing treatment, sharing that he is unable engage in vigorous exercise like he used to, which is difficult for him. Currently, he enjoys playing video games and talking to his friends. He described his appetite as normal and shared that he does not experience any pain. He sleeps 7 to 10 hours per night and has no sleep difficulties. He denied any changes in his neurocognitive abilities (i.e., attention/executive function, memory, motor functions). He described his overall mood as  good , and he denied symptoms of depression and anxiety. He shared that his mood is stable and  even keel  most of the time. Artemio denied self-harm and suicidal ideation. He also denied substance use.     Artemio continues to live with his paternal grandparents in [place], [place]. He shared that his long-term goal is to become financial independent and move abroad. He would like to work independently as he has an entrepreneurial mindset. Artemio finished high school and one semester of college at which point he decided to leave college and begin working. 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 However, he has not worked since that time and is not currently working due to his ongoing treatment. Regarding his social supports, Artemio shared he has a number of close friends he feels are supportive. He shared that he would like to be dating, although his current medical challenges (i.e., being immunosuppressed, changes to his physical appearance) and current COVID-19 restrictions interfere with actively dating. He shared that because he has undergone treatment in the past, his treatment feels  easier  this time. However, he also shared that his stress level is higher due to his elevated risk of  adverse outcomes. However, Artemio shared that he has positive outlook and is optimistic about his treatment, and he feels he is coping well.    Behavioral Observations   Artemio was seen for one day of testing while being accompanied to the appointment by his father. He was casually dressed and appropriately groomed. Vision and hearing seemed adequate for testing purposes. Artemio presented as a polite and talkative young man. He readily engaged in conversation throughout the testing session and demonstrated good back-and-forth social communication. He spoke using a normal rate, rhythm, tone, and prosody of speech while engaging in adequate eye-contact. His overall attitude was neutral while his mood matched the setting, and his affect was appropriate in range and intensity. No unusual motor mannerisms or repetitive behaviors were observed. Artemio appeared to have adequate language comprehension as task instructions were quickly understood. He appeared alert and oriented to his surroundings. Thought processes and content were both normal and organized. No distorted perceptions were observed. Insight and judgement both appeared intact and not impaired. Engagement throughout the evaluation was generally strong as Artemio was attentive and appeared to not hastily ryan through tasks.    Of note, the current evaluation was conducted during the COVID-19 pandemic. As such, the necessary personal protective equipment (PPE) was required to be worn throughout the evaluation. The examiner(s) wore a face mask, shield, and gloves, and Artemio wore a face mask. Safety procedures including but not limited to the use of PPE may result in increased distraction, anxiety and a diminished capacity for the patient and the examiner(s) to read nonverbal cues. Testing conditions with PPE are not consistent with the usual and customary process of evaluation. Nonetheless, Artemio appeared to put forth his best effort, and the PPE  worn did not appear to be of great interference. As such, under these circumstances, the results of this evaluation are considered a valid and accurate reflection of Artemio s current level of functioning at this time.      NEUROPSYCHOLOGICAL ASSESSMENT  Neuropsychological Evaluation Methods and Instruments  Review of Records  Clinical behavioral observations  Wechsler Adult Intelligence Scale, 4th Ed.  Test of Variables of Attention - Visual  Malou-Renae Executive Function System              Trail Making Test              Color-Word Interference  Purdue Pegboard  Drake Complex Figure Test and Recognition Trial   Wechsler Memory Scales, 4th Ed.   Logical Memory I & II   Visual Reproduction I & II  Behavior Rating Inventory of Executive Functioning, Adult Version - Informant Report   Behavior Assessment System for Children, 3rd Ed., Self-Report of Personality - [place]  A full summary of test scores is provided in tables at the end of this report.    IMPRESSIONS  Results of the current neuropsychological evaluation revealed a resilient young man with a wide variety of strengths. In the context of Artemio s complex medical history, he shows continued preserved skills across domains of neurocognitive functioning. Acute Lymphoblastic Leukemia (ALL) is a type of cancer in which the bone marrow produces immature lymphocytes (a type of white blood cell). The accumulation of these immature lymphocytes results in a loss of red and white blood cells, which can lead to anemia, reduced oxygen availability, and reduced immune functioning and increased risk of infection. Low platelet levels can lead to easy bleeding and bruising (thrombocytopenia). Artemio underwent bone marrow transplant (BMT) as treatment for ALL. His treatment has included total body irradiation as well as multiple courses of chemotherapy, and he plans to complete a second bone marrow transplant with the goal of permanently curing his ALL. These treatment  protocols can be associated with late effects that can occur months or years after treatment has ended, which can affect growth and development, emotional and behavioral functioning, and neurocognitive abilities (e.g., fine motor, attentional, and executive functioning difficulties, memory functioning, visual-spatial processing).    Artemio s evaluation indicated that he continues to function within the broadly average range across a majority of domains assessed, which is consistent with previous testing in 2019. These results are encouraging in the context of his risk for neurocognitive challenges. His intellectual functioning is average across domains. Artemio s executive functioning skills were also assessed. Broadly, executive functions are the skills necessary to regulate thoughts, behaviors, and emotions. These skills include impulse control, recognizing how behavior comes across to others, adjusting behavior or emotional expression in anticipation of contextual demands, getting started on activities and following through to completion, problem-solving, cognitive flexibility (i.e., thinking flexibly or adapting to changes), and emotional control. On a questionnaire assessing Artemio s executive function skills in everyday activities, his father s ratings indicated no concerns. Similarly, Artemio s performance across several executive function measures indicated average to high average performance. He showed a mild relative weakness in efficiently organizing, encoding, and retrieving complex visual information, performing slightly below average. However, his recognition of this information when given cues, as well as ability to learn and spontaneously recall visual and verbal information requiring a lower degree of organization, was average to high average, suggesting he does not show deficit in memory functioning. Research suggests that challenges in efficient organization of complex information is common  in individuals with a history of cancer treatments such as chemotherapy and radiation. Artemio s slight relative weakness in this domain may reflect subtle brain changes in white matter pathways (i.e., pathways through which different brain regions communicate with one another) indicated on his recent neuroimaging. However, this area of relative weakness is very mild considering Artemio s constellation of strengths, and he is likely to find effective strategies to compensate for this slight weakness. Results of fine motor testing indicated average performance, which is also often affected by cancer treatments. Together, results indicated that Artemio continues to show a wide variety of strengths, and he is highly capable of pursuing his occupational and avocational goals.       Finally, Artemio s responses on a questionnaire assessing his emotional and behavioral functioning indicated no elevated concerns. His responses did indicate poor adjustment to school. However, Artemio is not currently in school, and it is likely he answered these questions with respect to how he felt when previously in school (similar to his responses during his previous evaluation), suggesting these questions are currently irrelevant to his functioning. Overall, results indicate the Artemio is a resilient, friendly, and insightful young man. Given his medical history and ongoing treatment, his neurocognitive functioning should be regularly monitored, particularly if concerns arise. It is important to consider that individuals with ALL can experience emerging cognitive difficulties years after completing treatment.     Diagnoses:  C91.01 Acute lymphoblastic leukemia, in remission     RECOMMENDATIONS:  Artemio has a mild relative weakness with organizing and remembering visual information. This mild weakness could interfere with his performance on tasks requiring him to analyze visual and abstract information, such as interpreting  graphs and completing mathematics problems. He may benefit from verbalizing his approach to such tasks; for example, describing the task demands and his approach in his own words out loud. He is also likely to benefit from strategies to aid in his ability to process complex visual information, which could include using blank paper to block out irrelevant visual stimuli and intentionally direct his attention.     We would like to see Artemio again in our clinic in approximately 1 year to monitor his functioning in the context of his ongoing medical treatment.     We hope that our evaluation of Artemio assists you with the planning of his treatment. If you have any questions or comments please feel free to contact us at [phone].         Smith Segovia MA  Psychology Intern  Department of Pediatrics  Division of Clinical Behavioral Neuroscience   [facility]     John Singleton, Ph.D., L.P.     Department of Pediatrics  Director, Division of Clinical Behavioral Neuroscience  [facility]                PEDIATRIC NEUROPSYCHOLOGY CLINIC  CONFIDENTIAL TEST SCORES    Note: These scores are intended for appropriately licensed professionals and should never be interpreted without consideration of the attached narrative report.     Test Results:  Note: The test data listed below use one or more of the following formats:   *Standard Scores have an average of 100 and a standard deviation of 15 (the average range is 85 to 115).  *Scaled Scores have an average of 10 and a standard deviation of 3 (the average range is 7 to 13).   *T-Scores have an average range of 50 and a standard deviation of 10 (the average range is 40 to 60).   *Z-Scores have an average of 0 and a standard deviation of 1 (the average range is -1 to 1).      COGNITIVE FUNCTIONING    Wechsler Adult Intelligence Scale, Fourth Edition   Standard scores from 85 - 115 represent the average range of  functioning.  Scaled scores from 7 - 13 represent the average range of functioning.     Index Standard Score   Verbal Comprehension 105   Perceptual Reasoning 107   Working Memory 102   Processing Speed 97   Full Scale       Subtest Scaled Score   Similarities 10   Vocabulary 11   Information 12   Block Design 13   Visual Puzzles 10   Matrix Reasoning 11   Digit Span 11   Arithmetic 10   Coding 9   Symbol Search 10     ATTENTION AND EXECUTIVE FUNCTIONING    Malou-Renae Executive Function System Color-Word Interference Test  Scaled Scores from 7 - 13 represent the average range of functioning.     Measure Standard Score   Color Naming 11   Word Reading 9   Inhibition 11   Inhibition/Switching 10        Malou-Renae Executive Function System Trail Making Test  Scaled Scores from 7 - 13 represent the average range of functioning.     Measure Scaled Score   Visual Scanning 13   Number Sequencing 13   Letter Sequencing 14   Number-Letter Switching 12   Motor Speed 9      Behavior Rating Inventory of Executive Function, Adult Version - Informant Report  T-scores 65 and higher are considered to be in the  clinically significant  range.     Index/Scale Parent T-Score   Inhibit 42   Shift 38   Emotional Control 39   Self-Monitor 37   Behavioral Regulation Index (ARON) 37   Initiate 39   Working Memory 39   Plan/Organize 38   Task-Monitor 38   Organization of Materials 37   Metacognitive Index (MCI) 36   Global Executive Composite (GEC) 36     FINE-MOTOR AND VISUAL-MOTOR FUNCTIONING    Purdue Pegboard  Standard scores from 85 - 115 represent the average range of functioning.     Trial Pegs Placed Standard Score   Dominant (R) 15 96   Non-Dominant 15 99   Both Hands 13 pairs 100     MEMORY/ORIENTATION FUNCTIONING     Wechsler Memory Scale, Fourth Edition  Scaled scores from 7 - 13 represent the average range of functioning. Percentiles 16-84 represent the average range of functioning.     Subtest Scaled Score Cumulative  Percentile   Logical Memory I 9    Logical Memory II 10    Logical Memory Recognition - 26-50   Visual Reproduction I 14    Visual Reproduction II 10    Visual Reproduction Recognition  - 17-25     Drake Complex Figure Test and Recognition Trial   T-scores from 40 - 60 define the average range of functioning. Percentiles 16-84 represent the average range of functioning.     Task Raw T-Score Percentile    Copy 33 - 11-16   Immediate Recall 20 39    Delayed Recall 17.5 32    Recognition 22 52      EMOTIONAL AND BEHAVIORAL FUNCTIONING  For the Clinical Scales on the BASC-3, scores ranging from 60-69 are considered to be in the  at-risk  range and scores of 70 or higher are considered  clinically significant.   For the Adaptive Scales, scores between 30 and 39 are considered to be in the  at-risk  range and scores of 29 or lower are considered  clinically significant.       Behavioral Assessment System for Children, Third Edition, Self-Report of Personality - [place]    Clinical Scales T-Score  Adaptive Scales T-score   Atypicality 45   Relations with Parents 55   Locus of Control 48   Interpersonal Relations 43   Social Stress 53   Self-Esteem 61   Anxiety 36   Self-Reliance 57   Depression 57        Sense of Inadequacy  45   Composite Indices    Somatization 50   Internalizing Problems 47   Attention Problems 49   Inattention/Hyperactivity 47   Hyperactivity 46   Emotional Symptoms Index 44   Sensation Seeking 53   Personal Adjustment 55   Alcohol Abuse 43       Social Maladjustment   65*        *At-risk         Attestation:  Neuropsych testing was administered on 1/27/2021, by Smith Walsh MA, under my direct supervision. Total time spent in test administration and scoring by Clinical Trainee was 30 minutes (55660) and 4.5 hours (55930).  Attestation: 1 hour professional time, including interview, record review, data integration and report writing ([id number]); 4 hours additional professional time, including interview,  record review, data integration and report writing ([id number]).

## 2021-03-15 NOTE — PLAN OF CARE
Physical Therapy Discharge Summary    Reason for therapy discharge:    Discharged to home.    Progress towards therapy goal(s). See goals on Care Plan in [facility] electronic health record for goal details.  Goals met    Therapy recommendation(s):    Continue home exercise program.

## 2021-03-16 ENCOUNTER — HOME INFUSION (PRE-WILLOW HOME INFUSION) (OUTPATIENT)
Dept: PHARMACY | Facility: CLINIC | Age: 21
End: 2021-03-16

## 2021-03-16 ENCOUNTER — INFUSION THERAPY VISIT (OUTPATIENT)
Dept: INFUSION THERAPY | Facility: CLINIC | Age: 21
End: 2021-03-16
Attending: PEDIATRICS
Payer: COMMERCIAL

## 2021-03-16 ENCOUNTER — ONCOLOGY VISIT (OUTPATIENT)
Dept: TRANSPLANT | Facility: CLINIC | Age: 21
End: 2021-03-16
Attending: PHYSICIAN ASSISTANT
Payer: COMMERCIAL

## 2021-03-16 VITALS
OXYGEN SATURATION: 99 % | WEIGHT: 142.8 LBS | BODY MASS INDEX: 21.15 KG/M2 | DIASTOLIC BLOOD PRESSURE: 66 MMHG | HEART RATE: 84 BPM | TEMPERATURE: 98.2 F | HEIGHT: 69 IN | RESPIRATION RATE: 18 BRPM | SYSTOLIC BLOOD PRESSURE: 103 MMHG

## 2021-03-16 DIAGNOSIS — Z94.81 S/P ALLOGENEIC BONE MARROW TRANSPLANT (H): ICD-10-CM

## 2021-03-16 DIAGNOSIS — C91.02 ACUTE LYMPHOBLASTIC LEUKEMIA (ALL) IN RELAPSE (H): Primary | ICD-10-CM

## 2021-03-16 DIAGNOSIS — C91.01 ACUTE LYMPHOBLASTIC LEUKEMIA (ALL) IN REMISSION (H): Primary | ICD-10-CM

## 2021-03-16 DIAGNOSIS — C91.00 ALL (ACUTE LYMPHOBLASTIC LEUKEMIA OF INFANT) (H): ICD-10-CM

## 2021-03-16 LAB
ANION GAP SERPL CALCULATED.3IONS-SCNC: 2 MMOL/L (ref 3–14)
BASOPHILS # BLD AUTO: 0 10E9/L (ref 0–0.2)
BASOPHILS NFR BLD AUTO: 0 %
BLD PROD TYP BPU: NORMAL
BLD PROD TYP BPU: NORMAL
BLD UNIT ID BPU: 0
BLOOD PRODUCT CODE: NORMAL
BPU ID: NORMAL
BUN SERPL-MCNC: 25 MG/DL (ref 7–30)
CALCIUM SERPL-MCNC: 9.6 MG/DL (ref 8.5–10.1)
CHLORIDE SERPL-SCNC: 107 MMOL/L (ref 94–109)
CO2 SERPL-SCNC: 28 MMOL/L (ref 20–32)
CREAT SERPL-MCNC: 0.65 MG/DL (ref 0.66–1.25)
DIFFERENTIAL METHOD BLD: ABNORMAL
EOSINOPHIL # BLD AUTO: 0 10E9/L (ref 0–0.7)
EOSINOPHIL NFR BLD AUTO: 0 %
ERYTHROCYTE [DISTWIDTH] IN BLOOD BY AUTOMATED COUNT: 11.8 % (ref 10–15)
GFR SERPL CREATININE-BSD FRML MDRD: >90 ML/MIN/{1.73_M2}
GLUCOSE SERPL-MCNC: 122 MG/DL (ref 70–99)
HCT VFR BLD AUTO: 31.6 % (ref 40–53)
HGB BLD-MCNC: 10.9 G/DL (ref 13.3–17.7)
LYMPHOCYTES # BLD AUTO: 1.1 10E9/L (ref 0.8–5.3)
LYMPHOCYTES NFR BLD AUTO: 17.4 %
MCH RBC QN AUTO: 29.5 PG (ref 26.5–33)
MCHC RBC AUTO-ENTMCNC: 34.5 G/DL (ref 31.5–36.5)
MCV RBC AUTO: 85 FL (ref 78–100)
MONOCYTES # BLD AUTO: 1.9 10E9/L (ref 0–1.3)
MONOCYTES NFR BLD AUTO: 31.3 %
NEUTROPHILS # BLD AUTO: 3.2 10E9/L (ref 1.6–8.3)
NEUTROPHILS NFR BLD AUTO: 51.3 %
NRBC # BLD AUTO: 0.1 10*3/UL
NRBC BLD AUTO-RTO: 1 /100
NUM BPU REQUESTED: 1
PLATELET # BLD AUTO: 3 10E9/L (ref 150–450)
PLATELET # BLD EST: ABNORMAL 10*3/UL
POTASSIUM SERPL-SCNC: 4.5 MMOL/L (ref 3.4–5.3)
RBC # BLD AUTO: 3.7 10E12/L (ref 4.4–5.9)
RBC MORPH BLD: NORMAL
SODIUM SERPL-SCNC: 137 MMOL/L (ref 133–144)
TRANSFUSION STATUS PATIENT QL: NORMAL
TRANSFUSION STATUS PATIENT QL: NORMAL
WBC # BLD AUTO: 6.2 10E9/L (ref 4–11)

## 2021-03-16 PROCEDURE — P9037 PLATE PHERES LEUKOREDU IRRAD: HCPCS | Performed by: NURSE PRACTITIONER

## 2021-03-16 PROCEDURE — 85025 COMPLETE CBC W/AUTO DIFF WBC: CPT | Performed by: NURSE PRACTITIONER

## 2021-03-16 PROCEDURE — 258N000003 HC RX IP 258 OP 636: Performed by: NURSE PRACTITIONER

## 2021-03-16 PROCEDURE — 250N000011 HC RX IP 250 OP 636: Performed by: NURSE PRACTITIONER

## 2021-03-16 PROCEDURE — 96365 THER/PROPH/DIAG IV INF INIT: CPT

## 2021-03-16 PROCEDURE — 96375 TX/PRO/DX INJ NEW DRUG ADDON: CPT

## 2021-03-16 PROCEDURE — 250N000013 HC RX MED GY IP 250 OP 250 PS 637

## 2021-03-16 PROCEDURE — 250N000011 HC RX IP 250 OP 636

## 2021-03-16 PROCEDURE — 99215 OFFICE O/P EST HI 40 MIN: CPT | Performed by: PHYSICIAN ASSISTANT

## 2021-03-16 PROCEDURE — 80048 BASIC METABOLIC PNL TOTAL CA: CPT | Performed by: NURSE PRACTITIONER

## 2021-03-16 PROCEDURE — 99417 PROLNG OP E/M EACH 15 MIN: CPT | Performed by: PHYSICIAN ASSISTANT

## 2021-03-16 PROCEDURE — 36592 COLLECT BLOOD FROM PICC: CPT | Performed by: NURSE PRACTITIONER

## 2021-03-16 PROCEDURE — 258N000003 HC RX IP 258 OP 636: Performed by: PEDIATRICS

## 2021-03-16 PROCEDURE — 36430 TRANSFUSION BLD/BLD COMPNT: CPT

## 2021-03-16 RX ORDER — ACETAMINOPHEN 325 MG/1
650 TABLET ORAL ONCE
Status: CANCELLED
Start: 2021-03-16 | End: 2021-03-16

## 2021-03-16 RX ORDER — DIPHENHYDRAMINE HYDROCHLORIDE 50 MG/ML
50 INJECTION INTRAMUSCULAR; INTRAVENOUS EVERY 6 HOURS PRN
Status: DISCONTINUED | OUTPATIENT
Start: 2021-03-16 | End: 2021-03-16 | Stop reason: HOSPADM

## 2021-03-16 RX ORDER — DIPHENHYDRAMINE HYDROCHLORIDE 50 MG/ML
INJECTION INTRAMUSCULAR; INTRAVENOUS
Status: COMPLETED
Start: 2021-03-16 | End: 2021-03-16

## 2021-03-16 RX ORDER — ACETAMINOPHEN 325 MG/1
650 TABLET ORAL ONCE
Status: COMPLETED | OUTPATIENT
Start: 2021-03-16 | End: 2021-03-16

## 2021-03-16 RX ORDER — ACETAMINOPHEN 325 MG/1
TABLET ORAL
Status: COMPLETED
Start: 2021-03-16 | End: 2021-03-16

## 2021-03-16 RX ORDER — HEPARIN SODIUM,PORCINE 10 UNIT/ML
VIAL (ML) INTRAVENOUS
Status: COMPLETED
Start: 2021-03-16 | End: 2021-03-16

## 2021-03-16 RX ORDER — HEPARIN SODIUM,PORCINE 10 UNIT/ML
2 VIAL (ML) INTRAVENOUS
Status: DISCONTINUED | OUTPATIENT
Start: 2021-03-16 | End: 2021-03-16 | Stop reason: HOSPADM

## 2021-03-16 RX ORDER — ACETAMINOPHEN 325 MG/1
650 TABLET ORAL ONCE
Status: CANCELLED
Start: 2021-03-22

## 2021-03-16 RX ORDER — DIPHENHYDRAMINE HYDROCHLORIDE 50 MG/ML
50 INJECTION INTRAMUSCULAR; INTRAVENOUS ONCE
Status: CANCELLED | OUTPATIENT
Start: 2021-03-22

## 2021-03-16 RX ORDER — HEPARIN SODIUM,PORCINE 10 UNIT/ML
2 VIAL (ML) INTRAVENOUS
Status: CANCELLED | OUTPATIENT
Start: 2021-03-16

## 2021-03-16 RX ORDER — DIPHENHYDRAMINE HYDROCHLORIDE 50 MG/ML
50 INJECTION INTRAMUSCULAR; INTRAVENOUS EVERY 6 HOURS PRN
Status: CANCELLED
Start: 2021-03-16

## 2021-03-16 RX ADMIN — HEPARIN, PORCINE (PF) 10 UNIT/ML INTRAVENOUS SYRINGE 5 ML: at 14:22

## 2021-03-16 RX ADMIN — ACETAMINOPHEN 650 MG: 325 TABLET, FILM COATED ORAL at 11:58

## 2021-03-16 RX ADMIN — SODIUM CHLORIDE 50 ML: 9 INJECTION, SOLUTION INTRAVENOUS at 12:52

## 2021-03-16 RX ADMIN — DIPHENHYDRAMINE HYDROCHLORIDE 50 MG: 50 INJECTION, SOLUTION INTRAMUSCULAR; INTRAVENOUS at 11:58

## 2021-03-16 RX ADMIN — ACETAMINOPHEN 650 MG: 325 TABLET ORAL at 11:58

## 2021-03-16 RX ADMIN — Medication 5 ML: at 14:22

## 2021-03-16 RX ADMIN — DEXTROSE MONOHYDRATE 25 ML: 50 INJECTION, SOLUTION INTRAVENOUS at 13:59

## 2021-03-16 RX ADMIN — FILGRASTIM-SNDZ 330 MCG: 300 INJECTION, SOLUTION INTRAVENOUS; SUBCUTANEOUS at 13:59

## 2021-03-16 RX ADMIN — DIPHENHYDRAMINE HYDROCHLORIDE 50 MG: 50 INJECTION INTRAMUSCULAR; INTRAVENOUS at 11:58

## 2021-03-16 ASSESSMENT — MIFFLIN-ST. JEOR: SCORE: 1640.86

## 2021-03-16 NOTE — PROGRESS NOTES
The patient is scheduled for clinic follow up within 24-48 hours of hospital discharge. No post-hospital call is needed at this time.    Name: Artemio Nino MRN: [id number]   Date: 3/16/2021 Status: [facility]   Time: 11:30 AM Length: 60   Visit Type: [facility] BMT PEDS RETURN [[id number]] ANTONIO: [id number]   Provider: [facility] KHOA BMT 1 Department: [facility] PEDS BLD & MARROW         Nayeli Lucio CMA, BOSCH  Post Hospital Discharge Team

## 2021-03-16 NOTE — PROGRESS NOTES
This is a recent snapshot of the patient's [facility] medical record.  For current drug dose and complete information and questions, call [phone] or In Basket pool, fv home infusion ([id number])  CSN Number:  [id number]

## 2021-03-16 NOTE — PROGRESS NOTES
Infusion Nursing Note    Artemio Nino presents to the [facility] today for: platelet transfusion and GCSF    Due to :    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)  S/P allogeneic bone marrow transplant (H)  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)    Intravenous Access/Labs: purple lumen of patients double lumen central line was used for access. Labs were obtained prior to infusion visit.     Coping:   Child Family Life: declined    Infusion Note: Patient denies any fevers and/or infections. Pre-medication of PO Tylenol and IV Benadryl was given prior to the start of the infusion. Infusion completed without complication. Vital signs remained stable throughout. Blood return noted pre/post infusion. Purple lumen of patients central line was Heparin locked. Red lumen was Heparin locked in Lab and patients port was not due to be re-flushed. Patient was seen and assessed by JOHN Weller while in clinic.      Discharge Plan:   Patient verbalized understanding of discharge instructions. Patient left the [facility] with parents in stable condition.

## 2021-03-16 NOTE — PROGRESS NOTES
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 He was discharged from the hospital yesterday and returns to clinic this morning with his mother and father for labwork, GCSF infusion and platelet transfusion, and provider assessment.    Isael reports that he has been feeling well since discharge. He denies any nausea or need for antiemetic PRNs, taking his Ativan BID as scheduled. Shoulder pain continues to improve, no longer utilizing lidocaine patches and has not required Tylenol or Oxycodone PRNs since discharge. TPN/IL continues at 12h overnight, appetite remains minimal. Medications administered without issue last evening and this morning. Stools liquid in consistency, 2-3x daily and unchanged over past week. Completed oral vancomycin course yesterday for recent C Difficile infection. Abdominal discomfort related to hunger/lack of eating per Isael, but denies overt abdominal pain. Denies rash, headache, fever, cough, congestion, or other concerns.    Review of Systems: Pertinent positives include those mentioned in interval events. A complete review of systems was performed and is otherwise negative.      Medications:  Itraconazole 200 mg PO BID  Ativan 1 mg PO BID  Protonix 40 mg PO BID  Tacrolimus 1.5 mg PO BID  Valtrex 1g PO TID  PRNs:  Tylenol 650 mg q4h PRN  Tums 1 tab TID PRN  Oxycodone 5 mg q6h PRN    Physical Exam:  Vital Signs for Peds 3/16/2021   SYSTOLIC 99   DIASTOLIC 68   PULSE 89   TEMPERATURE 98.4   RESPIRATIONS 16   WEIGHT (kg) 64.8 kg   HEIGHT (cm) 175.7 cm   BMI 20.98   O2 98   GEN: awake, alert, reclined in infusion bed. Smiling, pleasant, cooperative. NAD. Parents present.  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 Discharged from the hospital yesterday, now transitioning to the outpatient setting. Pain and nausea minimal, neutrophil engrafted though remains platelet transfusion dependent. Clinically well appearing today.       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 S/p preparative regimen per MT2013-09 with Thiotepa (days -7 trough -6), Fludarabine (days -5 through -3), Busulfan (days -5 through -3), ATG (days -4 through -2) followed by UCBT (2/16).  Neutrophil recovery acheived day +20. Day +21 peripheral VNTRs (3/9) reveal 100% donor chimerism  - Bone marrow biopsy (3/11): flow & morphology negative, remaining tests pending     # Risk for GVHD:   - Continue Tacrolimus, goal 5-10.  Level 8.8 on 3/15 and dose reduced, repeat level tomorrow 3/17. Isael plans to take his tacrolimus dose at 1130 this evening for an 1130 labdraw tomorrow.  - MMF discontinued prior to discharge     FEN/Renal:  # Risk for malnutrition: intake minimal  - Continue TPN/IL (cycled to 12 hours)   - Regular diet as tolerated  - Dietician to follow     # Electrolyte abnormalities:    - Check daily electrolytes   - Replace/adjust in TPN as indicated     # Risk for renal dysfunction and fluid overload: GFR (1/28): 119 mls/min  - Monitor daily weights     # Risk for aHUS/TA-TMA:    - Monitor LDH qMon/Thurs: 155 (3/11)-- repeat tomorrow  3/17  - Monitor urine protein/creatinine qTuesday: 0.46 (3/9)-- repeat tomorrow 3/17     Pulmonary:  # Risk for pulmonary insufficiency: No current concerns     Cardiovascular:  # Asymptomatic WPW syndrome (diagnosed 2018): no interventions to date. Most recent EKG (1/27). ECHO (1/26) normal, EF 64%. 24h Zio patch holter (1/27-1/28) revealed c/w WPW with no ventricular ectopies present. Cardiology following. BNP elevated 664 (2/16) likely attributed to fluid shifts per cardiology. BNP threshold of concern is >5000. Weekly BNPs/Troponins monitored inpatient, now discontinued as an outpatient.  - Follow up ECHO in 6 mos (7/2021)     # Risk for hypertension secondary to medications: normotensive currently, monitoring     Heme:   # Pancytopenia secondary to chemotherapy  - Transfuse for hemoglobin < 8 (experienced mild dizziness and malaise when hgb in 7s); platelet < 10K.   - Hx of transfusion reactions (hives): Premedicate with tylenol and benadryl prior to pRBCs and plts  - GCSF daily until ANC >/= 2500 x 3 consecutive days-- today is day 2     # Recent hx of epistaxis: resolved.     Infectious Disease:  # Neutropenic fever (3/8): s/p Cefepime  - Follow blood cultures (3/8) from CVC and port-a-cath: NGTD     # C.Difficile colitis (2/17): PO vanco (2/17-2/26), restarted due to increased frequency and volume of watery stool with abdominal cramping.   - Continue second 10d course of oral vanco (3/5-3/15)      # Risk for infection given immunocompromised status with need for prophylaxis:  - Viral (CMV/HSV sero positive): HD Acyclovir. Weekly CMV neg 3/9.  - Fungal: continue Itraconazole (started 3/11)  - PCP: Pentamidine given 2/15. Consider Bactrim beginning Monday 3/22 (no hx of adverse reaction).  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 Next IgG due 3/20-- will draw 3/17 as infusion appt is available for IVIG 3/22.     Past infections: C. Diff  (November 2020)     GI:   # Reflux:  - BID protonix   - PRN Tums      # Nausea: s/p 3d course of emend x2 (2/16-2/18 and 2/21-2/23)   - Ativan BID  - Benadryl prn     # Risk for VOD: most recent abd US (2/22) revealed stable, mild hepatomegaly (previously enrolled in elastography study)  - Ursodiol TID discontinued at discharge     Neuro:  # Mucositis/abdominal pain: Denies pain  - Tylenol/Oxycodone PRN     # Left lateral shoulder/deltoid pain (3/6): c/w MSK pain, decreased ROM, pain on palpation. Much improved.   - continue PT      # History of significant spinal headaches: consider using Candelario needle with lumbar punctures     # CRS history. 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 No CRS, neurotoxicity or infectious complications from [place] CAR-T.      Fertility: Sperm collected previously collected for fertility preservation    SEBAS Christian (Flesher), PA-C  Pediatric Blood and Marrow Transplant Program  [facility]  Pager: [phone]  Fax: [phone]    I spent a total of 90 minutes with Artemio Nino on the date of encounter doing chart review, history and exam, review of labs/imaging, discussion with the family, documentation and further activities as noted above.     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

## 2021-03-16 NOTE — PHARMACY-CONSULT NOTE
TPN note for discharge:  Artemio's TPN formula, labs, and nutritional status were reviewed today.      Based on the results the following changes are recommended to the TPN:  decrease potassium       This was discussed with HANNAH Mendosa and communicated to [facility].    Artemio will be in clinic Wednesday 3/17/21 for labs and reassessment.      The following reflects the new TPN formula.  Dosing Weight:  64.5 kg  Volume:  1800 mL, cycled over 12 hours  Protein:  100 gm  Dextrose:  236 gm  Lipids:  240 mL/day     Sodium:  80 mEq/day  Potassium: 25 mEq/day (decreased from  30 mEq/day)  Calcium:  10 mEq/day  Magnesium:  45 mEq/day  Phosphorus:  zero  Chloride:Acetate ratio:  1:2  Trace elements with Selenium:  standard  Multivitamins:  standard  Vitamin K:  10 mg      Pharmacy will continue to follow.   Alexa Ramirez RPH

## 2021-03-16 NOTE — PATIENT INSTRUCTIONS
Return to [facility] for labs and exam with an POLLO on Weds 3/17. Please hold Tacrolimus prior to visit for blood drug level, and take this medication after level obtained. Will plan to take 3/16 PM dose at 11:30 PM for 11:30 AM labdraw 3/17    Infusion needs: possible platelets and GCSF    Patient has PICC, Central line, CVC line, to be drawn off of per lab.     Medication changes: none    Care plan changes: none    Contact information  During business hours (7:30am-4:30pm):   To leave a non-urgent voicemail: call triage line [phone]    To call for time-sensitive needs or concerns : call clinic  [phone]    Evenings after 4:30pm, weekends, and holidays:   For any needs or concerns: call for BMT fellow at [phone] in the case of an emergency    Thank you!

## 2021-03-17 ENCOUNTER — ONCOLOGY VISIT (OUTPATIENT)
Dept: TRANSPLANT | Facility: CLINIC | Age: 21
End: 2021-03-17
Attending: NURSE PRACTITIONER
Payer: COMMERCIAL

## 2021-03-17 ENCOUNTER — HOME INFUSION (PRE-WILLOW HOME INFUSION) (OUTPATIENT)
Dept: PHARMACY | Facility: CLINIC | Age: 21
End: 2021-03-17

## 2021-03-17 ENCOUNTER — INFUSION THERAPY VISIT (OUTPATIENT)
Dept: INFUSION THERAPY | Facility: CLINIC | Age: 21
End: 2021-03-17
Attending: PEDIATRICS
Payer: COMMERCIAL

## 2021-03-17 VITALS
DIASTOLIC BLOOD PRESSURE: 63 MMHG | HEIGHT: 69 IN | SYSTOLIC BLOOD PRESSURE: 98 MMHG | HEART RATE: 69 BPM | TEMPERATURE: 98.3 F | WEIGHT: 143.08 LBS | OXYGEN SATURATION: 98 % | RESPIRATION RATE: 16 BRPM | BODY MASS INDEX: 21.19 KG/M2

## 2021-03-17 DIAGNOSIS — C91.01 ACUTE LYMPHOBLASTIC LEUKEMIA (ALL) IN REMISSION (H): Primary | ICD-10-CM

## 2021-03-17 DIAGNOSIS — C91.01 ACUTE LYMPHOBLASTIC LEUKEMIA (ALL) IN REMISSION (H): ICD-10-CM

## 2021-03-17 DIAGNOSIS — C91.02 ACUTE LYMPHOBLASTIC LEUKEMIA (ALL) IN RELAPSE (H): ICD-10-CM

## 2021-03-17 DIAGNOSIS — C91.00 ALL (ACUTE LYMPHOBLASTIC LEUKEMIA OF INFANT) (H): ICD-10-CM

## 2021-03-17 DIAGNOSIS — Z94.81 S/P ALLOGENEIC BONE MARROW TRANSPLANT (H): ICD-10-CM

## 2021-03-17 LAB
ANION GAP SERPL CALCULATED.3IONS-SCNC: <1 MMOL/L (ref 3–14)
BASOPHILS # BLD AUTO: 0 10E9/L (ref 0–0.2)
BASOPHILS NFR BLD AUTO: 0 %
BLD PROD TYP BPU: NORMAL
BUN SERPL-MCNC: 23 MG/DL (ref 7–30)
CALCIUM SERPL-MCNC: 9.3 MG/DL (ref 8.5–10.1)
CHLORIDE SERPL-SCNC: 107 MMOL/L (ref 94–109)
CO2 SERPL-SCNC: 31 MMOL/L (ref 20–32)
CREAT SERPL-MCNC: 0.59 MG/DL (ref 0.66–1.25)
CREAT UR-MCNC: 149 MG/DL
DIFFERENTIAL METHOD BLD: ABNORMAL
EOSINOPHIL # BLD AUTO: 0 10E9/L (ref 0–0.7)
EOSINOPHIL NFR BLD AUTO: 0 %
ERYTHROCYTE [DISTWIDTH] IN BLOOD BY AUTOMATED COUNT: 11.9 % (ref 10–15)
GFR SERPL CREATININE-BSD FRML MDRD: >90 ML/MIN/{1.73_M2}
GLUCOSE SERPL-MCNC: 123 MG/DL (ref 70–99)
HCT VFR BLD AUTO: 30.8 % (ref 40–53)
HGB BLD-MCNC: 10.5 G/DL (ref 13.3–17.7)
LDH SERPL L TO P-CCNC: 186 U/L (ref 85–227)
LYMPHOCYTES # BLD AUTO: 0.8 10E9/L (ref 0.8–5.3)
LYMPHOCYTES NFR BLD AUTO: 12.2 %
MAGNESIUM SERPL-MCNC: 2 MG/DL (ref 1.6–2.3)
MCH RBC QN AUTO: 29.4 PG (ref 26.5–33)
MCHC RBC AUTO-ENTMCNC: 34.1 G/DL (ref 31.5–36.5)
MCV RBC AUTO: 86 FL (ref 78–100)
MONOCYTES # BLD AUTO: 2.3 10E9/L (ref 0–1.3)
MONOCYTES NFR BLD AUTO: 35.7 %
MYELOCYTES # BLD: 0.1 10E9/L
MYELOCYTES NFR BLD MANUAL: 0.9 %
NEUTROPHILS # BLD AUTO: 3.3 10E9/L (ref 1.6–8.3)
NEUTROPHILS NFR BLD AUTO: 51.2 %
NUM BPU REQUESTED: 1
PHOSPHATE SERPL-MCNC: 4.2 MG/DL (ref 2.5–4.5)
PLATELET # BLD AUTO: 11 10E9/L (ref 150–450)
PLATELET # BLD EST: ABNORMAL 10*3/UL
POTASSIUM SERPL-SCNC: 4.3 MMOL/L (ref 3.4–5.3)
PROT UR-MCNC: 0.21 G/L
PROT/CREAT 24H UR: 0.14 G/G CR (ref 0–0.2)
RBC # BLD AUTO: 3.57 10E12/L (ref 4.4–5.9)
SODIUM SERPL-SCNC: 138 MMOL/L (ref 133–144)
TACROLIMUS BLD-MCNC: 11.2 UG/L (ref 5–15)
TME LAST DOSE: NORMAL H
TOXIC GRANULES BLD QL SMEAR: PRESENT
WBC # BLD AUTO: 6.4 10E9/L (ref 4–11)

## 2021-03-17 PROCEDURE — 96374 THER/PROPH/DIAG INJ IV PUSH: CPT

## 2021-03-17 PROCEDURE — 36592 COLLECT BLOOD FROM PICC: CPT | Performed by: PHYSICIAN ASSISTANT

## 2021-03-17 PROCEDURE — 250N000011 HC RX IP 250 OP 636

## 2021-03-17 PROCEDURE — 85025 COMPLETE CBC W/AUTO DIFF WBC: CPT | Performed by: PHYSICIAN ASSISTANT

## 2021-03-17 PROCEDURE — 258N000003 HC RX IP 258 OP 636: Performed by: PEDIATRICS

## 2021-03-17 PROCEDURE — 84100 ASSAY OF PHOSPHORUS: CPT | Performed by: PHYSICIAN ASSISTANT

## 2021-03-17 PROCEDURE — P9037 PLATE PHERES LEUKOREDU IRRAD: HCPCS | Performed by: NURSE PRACTITIONER

## 2021-03-17 PROCEDURE — 82784 ASSAY IGA/IGD/IGG/IGM EACH: CPT | Performed by: PHYSICIAN ASSISTANT

## 2021-03-17 PROCEDURE — 80048 BASIC METABOLIC PNL TOTAL CA: CPT | Performed by: PHYSICIAN ASSISTANT

## 2021-03-17 PROCEDURE — 83615 LACTATE (LD) (LDH) ENZYME: CPT | Performed by: PHYSICIAN ASSISTANT

## 2021-03-17 PROCEDURE — 84156 ASSAY OF PROTEIN URINE: CPT | Performed by: PHYSICIAN ASSISTANT

## 2021-03-17 PROCEDURE — 36430 TRANSFUSION BLD/BLD COMPNT: CPT

## 2021-03-17 PROCEDURE — 83735 ASSAY OF MAGNESIUM: CPT | Performed by: PHYSICIAN ASSISTANT

## 2021-03-17 PROCEDURE — 99215 OFFICE O/P EST HI 40 MIN: CPT | Performed by: NURSE PRACTITIONER

## 2021-03-17 PROCEDURE — 250N000013 HC RX MED GY IP 250 OP 250 PS 637

## 2021-03-17 PROCEDURE — 80197 ASSAY OF TACROLIMUS: CPT | Performed by: PHYSICIAN ASSISTANT

## 2021-03-17 RX ORDER — ACETAMINOPHEN 325 MG/1
TABLET ORAL
Status: COMPLETED
Start: 2021-03-17 | End: 2021-03-17

## 2021-03-17 RX ORDER — LORAZEPAM 1 MG/1
1 TABLET ORAL 2 TIMES DAILY PRN
Qty: 60 TABLET | Refills: 0 | Status: SHIPPED | OUTPATIENT
Start: 2021-03-17 | End: 2022-02-08

## 2021-03-17 RX ORDER — HEPARIN SODIUM,PORCINE 10 UNIT/ML
VIAL (ML) INTRAVENOUS
Status: COMPLETED
Start: 2021-03-17 | End: 2021-03-17

## 2021-03-17 RX ORDER — ACETAMINOPHEN 325 MG/1
650 TABLET ORAL ONCE
Status: CANCELLED
Start: 2021-03-17 | End: 2021-03-17

## 2021-03-17 RX ORDER — DIPHENHYDRAMINE HYDROCHLORIDE 50 MG/ML
50 INJECTION INTRAMUSCULAR; INTRAVENOUS EVERY 6 HOURS PRN
Status: CANCELLED
Start: 2021-03-17

## 2021-03-17 RX ORDER — TACROLIMUS 1 MG/1
CAPSULE ORAL
Qty: 60 CAPSULE | Refills: 3 | Status: SHIPPED | OUTPATIENT
Start: 2021-03-17 | End: 2021-03-30

## 2021-03-17 RX ORDER — TACROLIMUS 0.5 MG/1
CAPSULE ORAL
Qty: 60 CAPSULE | Refills: 3 | Status: SHIPPED | OUTPATIENT
Start: 2021-03-17 | End: 2021-04-02

## 2021-03-17 RX ORDER — HEPARIN SODIUM,PORCINE 10 UNIT/ML
2-4 VIAL (ML) INTRAVENOUS EVERY 24 HOURS
Status: DISCONTINUED | OUTPATIENT
Start: 2021-03-17 | End: 2021-03-17 | Stop reason: HOSPADM

## 2021-03-17 RX ORDER — ACETAMINOPHEN 325 MG/1
650 TABLET ORAL ONCE
Status: COMPLETED | OUTPATIENT
Start: 2021-03-17 | End: 2021-03-17

## 2021-03-17 RX ORDER — DIPHENHYDRAMINE HYDROCHLORIDE 50 MG/ML
INJECTION INTRAMUSCULAR; INTRAVENOUS
Status: COMPLETED
Start: 2021-03-17 | End: 2021-03-17

## 2021-03-17 RX ORDER — DIPHENHYDRAMINE HYDROCHLORIDE 50 MG/ML
50 INJECTION INTRAMUSCULAR; INTRAVENOUS EVERY 6 HOURS PRN
Status: DISCONTINUED | OUTPATIENT
Start: 2021-03-17 | End: 2021-03-17 | Stop reason: HOSPADM

## 2021-03-17 RX ADMIN — ACETAMINOPHEN 650 MG: 325 TABLET ORAL at 12:20

## 2021-03-17 RX ADMIN — SODIUM CHLORIDE 20 ML: 9 INJECTION, SOLUTION INTRAVENOUS at 14:06

## 2021-03-17 RX ADMIN — DIPHENHYDRAMINE HYDROCHLORIDE 50 MG: 50 INJECTION INTRAMUSCULAR; INTRAVENOUS at 12:20

## 2021-03-17 RX ADMIN — DIPHENHYDRAMINE HYDROCHLORIDE 50 MG: 50 INJECTION, SOLUTION INTRAMUSCULAR; INTRAVENOUS at 12:20

## 2021-03-17 RX ADMIN — ACETAMINOPHEN 650 MG: 325 TABLET, FILM COATED ORAL at 12:20

## 2021-03-17 RX ADMIN — Medication 50 UNITS: at 14:07

## 2021-03-17 RX ADMIN — HEPARIN, PORCINE (PF) 10 UNIT/ML INTRAVENOUS SYRINGE 50 UNITS: at 14:07

## 2021-03-17 ASSESSMENT — PAIN SCALES - GENERAL: PAINLEVEL: NO PAIN (0)

## 2021-03-17 ASSESSMENT — MIFFLIN-ST. JEOR: SCORE: 1640.87

## 2021-03-17 NOTE — PROGRESS NOTES
Infusion Nursing Note    Artemio Nino presents to the [facility] today for: platelet transfusion and GCSF    Due to :    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)  S/P allogeneic bone marrow transplant (H)  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)    Intravenous Access/Labs: purple lumen of patients double lumen central line was used for access. Labs were obtained prior to infusion visit.     Coping:   Child Family Life: declined    Infusion Note: Patient denies any fevers and/or infections. Pre-medication of PO Tylenol and IV Benadryl was given prior to the start of the infusion. Infusion completed without complication. Vital signs remained stable throughout. Blood return noted pre/post infusion. Purple lumen of patients central line was Heparin locked. Red lumen was Heparin locked in Lab and patients port was not due to be re-flushed. Patient was seen and assessed by Lisa Workman NP while in clinic.  Per Lisa, no GCSF to be given today.    Discharge Plan:   Patient verbalized understanding of discharge instructions. Patient left the [facility] with parents in stable condition.

## 2021-03-17 NOTE — PHARMACY-CONSULT NOTE
TPN note for discharge:  Artemio's TPN formula, labs, and nutritional status were reviewed today.       Based on the results the following changes are recommended to the TPN:  no changes       This was discussed with Lisa Workman NP and communicated to [facility].    Artemio will be in clinic Thursday 3/18/21 for labs and reassessment.      The following reflects the new TPN formula.  Dosing Weight:  64.5 kg  Volume:  1800 mL, cycled over 12 hours  Protein:  100 gm  Dextrose:  236 gm  Lipids:  240 mL/day     Sodium:  80 mEq/day  Potassium: 25 mEq/day   Calcium:  10 mEq/day  Magnesium:  45 mEq/day  Phosphorus:  zero  Chloride:Acetate ratio:  1:2  Trace elements with Selenium:  standard  Multivitamins:  standard  Vitamin K:  10 mg        Pharmacy will continue to follow.   Alexa Ramirez RPH

## 2021-03-17 NOTE — PHARMACY-IMMUNOSUPPRESSION MONITORING
Tacrolimus Monitoring Note      D:  Current tacrolimus dose:  1 mg by mouth every morning and 0.5 mg by mouth every evening     Tacro level:  11.2 ug/L (drawn 13 hours post dose on an ideal 12 hour interval).    Goals for therapy = 5-10 ug/L     A:  Current trough level is above the desired range.  Drug interactions include itraconazole started 3/11.    P:  Decrease to tacrolimus 0.5 mg PO BID.   Discussed recommendations with Lisa Workman NP.  Recheck trough level on Friday 3/19.  Pharmacy team will continue to follow.     Thank you!  Alexa Ramirez HAKAN

## 2021-03-17 NOTE — PROGRESS NOTES
This is a recent snapshot of the patient's [facility] medical record.  For current drug dose and complete information and questions, call [phone] or In Basket pool, fv home infusion ([id number])  CSN Number:  [id number]

## 2021-03-17 NOTE — PROGRESS NOTES
Pediatric BMT Daily Progress Note    HPI: Artemio is a 21 yo male with a history of multiply relapsed ALL now day +29 s/p UCBT following MAC per 2013-09. He was discharged from the hospital earlier this week and returns to clinic this morning with his mother and father for labwork, GCSF infusion,  platelet transfusion and provider assessment. He is engrafted with no signs of GVHD. His hemoglobin is stable, he has required GCSF every few days and is requiring daily platelet transfusions.     Isael reports that he has been feeling well since discharge. He denies nausea and is only taking his Ativan prn and not scheduled. Shoulder pain continues to improve, only using Oxycodone PRNs occasionally. TPN/IL continues at 12h overnight, appetite remains minimal but he is interested in food now. Medications administered without issue.  Stools liquid in consistency, 2-3x daily and unchanged over past week. Completed oral vancomycin course 3/15 for recent C Difficile infection. Abdominal discomfort related to hunger/lack of eating per Isael, but denies overt abdominal pain. Denies rash, headache, fever, cough, congestion, or other concerns.    Review of Systems: Pertinent positives include those mentioned in interval events. A complete review of systems was performed and is otherwise negative.      Medications:  Itraconazole 200 mg PO BID  Protonix 40 mg PO BID  Tacrolimus 0.5 mg PO BID  Valtrex 1g PO TID  PRNs:  Ativan 1 mg PO BID PRN  Tylenol 650 mg q4h PRN  Tums 1 tab TID PRN  Oxycodone 5 mg q6h PRN    Physical Exam:  Vital Signs for Peds 3/17/2021   SYSTOLIC 101   DIASTOLIC 69   PULSE 97   TEMPERATURE 98.1   RESPIRATIONS 18   WEIGHT (kg) 64.9 kg   HEIGHT (cm) 175.5 cm   BMI 21.07   pain 0   O2 99     EN: awake, alert, reclined in infusion bed. Smiling, pleasant, cooperative. NAD. Parents present.  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     Discharged from the hospital 3/15, now transitioning to the outpatient setting. Pain and nausea minimal, neutrophil engrafted though remains platelet transfusion dependent. Clinically well appearing today.    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 S/p preparative regimen per MT2013-09 with Thiotepa (days -7 trough -6), Fludarabine (days -5 through -3), Busulfan (days -5 through -3), ATG (days -4 through -2) followed by UCBT (2/16).  Neutrophil recovery acheived day +20. Day +21 peripheral VNTRs (3/9) reveal 100% donor chimerism  - Bone marrow biopsy (3/11): flow & morphology negative, FISH, chromosomes and NGS pending.     # Risk for GVHD:   - Continue Tacrolimus, goal 5-10.  Level 11.2 on 3/17 and dose reduced, repeat level tomorrow 3/18.   - MMF discontinued prior to discharge     FEN/Renal:  # Risk for malnutrition: intake minimal  - Continue TPN/IL (cycled to 12 hours)   - Regular diet as tolerated  - Dietician to follow     # Electrolyte abnormalities:    - Check daily electrolytes   - Replace/adjust in TPN as indicated     # Risk for renal dysfunction and fluid overload: GFR (1/28): 119 mls/min  - Monitor daily weights     # Risk for aHUS/TA-TMA:    - Monitor LDH qMon/Thurs: 186 (3/17)  - Monitor urine protein/creatinine qTuesday: 0.14  (3/17)     Pulmonary:  # Risk for pulmonary insufficiency: No current concerns     Cardiovascular:  # Asymptomatic WPW syndrome (diagnosed 2018): no interventions to date. Most recent EKG (1/27). ECHO (1/26) normal, EF 64%. 24h Zio patch holter (1/27-1/28) revealed c/w WPW with no ventricular ectopies present. Cardiology following. BNP elevated 664 (2/16) likely attributed to fluid shifts per cardiology. BNP threshold of concern is >5000. Weekly BNPs/Troponins monitored inpatient, now discontinued as an outpatient.  - Follow up ECHO in 6 mos (7/2021)     # Risk for hypertension secondary to medications: normotensive      Heme:   # Pancytopenia secondary to chemotherapy  - Transfuse for hemoglobin < 8 (experienced mild dizziness and malaise when hgb in 7s); platelet < 10K.   - Hx of transfusion reactions (hives): Premedicate with tylenol and benadryl prior to pRBCs and plts  - GCSF daily until ANC >/= 2500 x 3 consecutive days-- today is day 3     # Recent hx of epistaxis: resolved.     Infectious Disease:  # Neutropenic fever (3/8): s/p Cefepime  - Follow blood cultures (3/8 yeast) from CVC and port-a-cath: NGTD     # C.Difficile colitis (2/17): PO vanco (2/17-2/26), restarted due to increased frequency and volume of watery stool with abdominal cramping.   - S/P 10 day course oral vanco (3/5-3/15)      # Risk for infection given immunocompromised status with need for prophylaxis:  - Viral (CMV/HSV sero positive): HD Acyclovir. Weekly CMV neg 3/9.  - Fungal: continue Itraconazole (started 3/11)  - PCP: Pentamidine given 2/15. Bactrim beginning Monday 3/29 (no hx of adverse reaction).  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 IgG level pending 3/15.     Past infections: C. Diff (November 2020)     GI:   # Reflux:  - BID protonix   - PRN Tums      # Nausea: Denies  - Ativan was scheduled BID, Isael reports he is taking it prn.     # Risk for VOD:  most recent abd US (2/22) revealed stable, mild hepatomegaly (previously enrolled in elastography study)  - Ursodiol TID discontinued at discharge     Neuro:  # Mucositis/abdominal pain: Denies pain  - Tylenol/Oxycodone PRN     # Left lateral shoulder/deltoid pain (3/6): c/w MSK pain, decreased ROM, pain on palpation. Much improved.   - continue PT      # History of significant spinal headaches: consider using Candelario needle with lumbar punctures     # CRS history. 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 No CRS, neurotoxicity or infectious complications from [place] CAR-T.      Fertility: Sperm collected previously collected for fertility preservation    Disposition: RTC daily (3/18  Next) for exam, labs, platelet transfusions.    BENJA Chen  [facility]  Pediatric Blood and Marrow Transplant    I spent a total of 45 minutes with Artemio Nino on the date of encounter doing chart review, history and exam, review of labs/imaging, discussion with the family, documentation and further activities as noted above.     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

## 2021-03-18 ENCOUNTER — INFUSION THERAPY VISIT (OUTPATIENT)
Dept: INFUSION THERAPY | Facility: CLINIC | Age: 21
End: 2021-03-18
Attending: PEDIATRICS
Payer: COMMERCIAL

## 2021-03-18 ENCOUNTER — HOME INFUSION (PRE-WILLOW HOME INFUSION) (OUTPATIENT)
Dept: PHARMACY | Facility: CLINIC | Age: 21
End: 2021-03-18

## 2021-03-18 ENCOUNTER — ONCOLOGY VISIT (OUTPATIENT)
Dept: TRANSPLANT | Facility: CLINIC | Age: 21
End: 2021-03-18
Attending: NURSE PRACTITIONER
Payer: COMMERCIAL

## 2021-03-18 VITALS
RESPIRATION RATE: 24 BRPM | SYSTOLIC BLOOD PRESSURE: 100 MMHG | OXYGEN SATURATION: 100 % | WEIGHT: 142.64 LBS | HEART RATE: 86 BPM | BODY MASS INDEX: 21.01 KG/M2 | DIASTOLIC BLOOD PRESSURE: 68 MMHG | TEMPERATURE: 98.3 F

## 2021-03-18 DIAGNOSIS — C91.00 ALL (ACUTE LYMPHOBLASTIC LEUKEMIA OF INFANT) (H): ICD-10-CM

## 2021-03-18 DIAGNOSIS — C91.01 ACUTE LYMPHOBLASTIC LEUKEMIA (ALL) IN REMISSION (H): ICD-10-CM

## 2021-03-18 DIAGNOSIS — Z94.81 S/P ALLOGENEIC BONE MARROW TRANSPLANT (H): ICD-10-CM

## 2021-03-18 DIAGNOSIS — C91.01 ACUTE LYMPHOBLASTIC LEUKEMIA (ALL) IN REMISSION (H): Primary | ICD-10-CM

## 2021-03-18 DIAGNOSIS — C91.02 ACUTE LYMPHOBLASTIC LEUKEMIA (ALL) IN RELAPSE (H): Primary | ICD-10-CM

## 2021-03-18 DIAGNOSIS — Z94.81 STATUS POST BONE MARROW TRANSPLANT (H): ICD-10-CM

## 2021-03-18 LAB
ANION GAP SERPL CALCULATED.3IONS-SCNC: 4 MMOL/L (ref 3–14)
BASOPHILS # BLD AUTO: 0 10E9/L (ref 0–0.2)
BASOPHILS NFR BLD AUTO: 0.9 %
BLD PROD TYP BPU: NORMAL
BLD UNIT ID BPU: 0
BLOOD PRODUCT CODE: NORMAL
BPU ID: NORMAL
BUN SERPL-MCNC: 22 MG/DL (ref 7–30)
CALCIUM SERPL-MCNC: 9.2 MG/DL (ref 8.5–10.1)
CHLORIDE SERPL-SCNC: 106 MMOL/L (ref 94–109)
CMV DNA SPEC NAA+PROBE-ACNC: NORMAL [IU]/ML
CMV DNA SPEC NAA+PROBE-LOG#: NORMAL {LOG_IU}/ML
CO2 SERPL-SCNC: 26 MMOL/L (ref 20–32)
CREAT SERPL-MCNC: 0.59 MG/DL (ref 0.66–1.25)
DIFFERENTIAL METHOD BLD: ABNORMAL
DLCOUNC-%PRED-PRE: 86 %
DLCOUNC-PRE: 27.67 ML/MIN/MMHG
DLCOUNC-PRED: 31.96 ML/MIN/MMHG
EOSINOPHIL # BLD AUTO: 0 10E9/L (ref 0–0.7)
EOSINOPHIL NFR BLD AUTO: 0 %
ERV-%PRED-PRE: 113 %
ERV-PRE: 2.42 L
ERV-PRED: 2.14 L
ERYTHROCYTE [DISTWIDTH] IN BLOOD BY AUTOMATED COUNT: 11.9 % (ref 10–15)
EXPTIME-PRE: 3.58 SEC
FEF2575-%PRED-PRE: 117 %
FEF2575-PRE: 5.73 L/SEC
FEF2575-PRED: 4.9 L/SEC
FEFMAX-%PRED-PRE: 94 %
FEFMAX-PRE: 9.43 L/SEC
FEFMAX-PRED: 9.93 L/SEC
FEV1-%PRED-PRE: 111 %
FEV1-PRE: 5.07 L
FEV1FEV6-PRE: 87 %
FEV1FEV6-PRED: 84 %
FEV1FVC-PRE: 87 %
FEV1FVC-PRED: 85 %
FEV1SVC-PRE: 85 %
FEV1SVC-PRED: 80 %
FIFMAX-PRE: 6.61 L/SEC
FRCPLETH-%PRED-PRE: 124 %
FRCPLETH-PRE: 4.02 L
FRCPLETH-PRED: 3.24 L
FVC-%PRED-PRE: 108 %
FVC-PRE: 5.82 L
FVC-PRED: 5.37 L
GFR SERPL CREATININE-BSD FRML MDRD: >90 ML/MIN/{1.73_M2}
GLUCOSE SERPL-MCNC: 127 MG/DL (ref 70–99)
HCT VFR BLD AUTO: 29 % (ref 40–53)
HGB BLD-MCNC: 10 G/DL (ref 13.3–17.7)
IC-%PRED-PRE: 99 %
IC-PRE: 3.55 L
IC-PRED: 3.57 L
IGG SERPL-MCNC: 839 MG/DL (ref 610–1616)
INR PPP: 1.04 (ref 0.86–1.14)
LYMPHOCYTES # BLD AUTO: 1 10E9/L (ref 0.8–5.3)
LYMPHOCYTES NFR BLD AUTO: 22.8 %
MAGNESIUM SERPL-MCNC: 2 MG/DL (ref 1.6–2.3)
MCH RBC QN AUTO: 29.9 PG (ref 26.5–33)
MCHC RBC AUTO-ENTMCNC: 34.5 G/DL (ref 31.5–36.5)
MCV RBC AUTO: 87 FL (ref 78–100)
METAMYELOCYTES # BLD: 0.1 10E9/L
METAMYELOCYTES NFR BLD MANUAL: 1.8 %
MONOCYTES # BLD AUTO: 1 10E9/L (ref 0–1.3)
MONOCYTES NFR BLD AUTO: 23.7 %
NEUTROPHILS # BLD AUTO: 2.2 10E9/L (ref 1.6–8.3)
NEUTROPHILS NFR BLD AUTO: 50.8 %
NRBC # BLD AUTO: 0 10*3/UL
NRBC BLD AUTO-RTO: 1 /100
PHOSPHATE SERPL-MCNC: 4.1 MG/DL (ref 2.5–4.5)
PLATELET # BLD AUTO: 19 10E9/L (ref 150–450)
PLATELET # BLD EST: ABNORMAL 10*3/UL
POIKILOCYTOSIS BLD QL SMEAR: SLIGHT
POTASSIUM SERPL-SCNC: 4.2 MMOL/L (ref 3.4–5.3)
RBC # BLD AUTO: 3.35 10E12/L (ref 4.4–5.9)
RVPLETH-%PRED-PRE: 98 %
RVPLETH-PRE: 1.6 L
RVPLETH-PRED: 1.62 L
SODIUM SERPL-SCNC: 136 MMOL/L (ref 133–144)
SPECIMEN SOURCE: NORMAL
TLCPLETH-%PRED-PRE: 109 %
TLCPLETH-PRE: 7.57 L
TLCPLETH-PRED: 6.94 L
TRANSFUSION STATUS PATIENT QL: NORMAL
TRANSFUSION STATUS PATIENT QL: NORMAL
VA-%PRED-PRE: 116 %
VA-PRE: 7.23 L
VC-%PRED-PRE: 104 %
VC-PRE: 5.97 L
VC-PRED: 5.71 L
WBC # BLD AUTO: 4.4 10E9/L (ref 4–11)

## 2021-03-18 PROCEDURE — 85025 COMPLETE CBC W/AUTO DIFF WBC: CPT | Performed by: NURSE PRACTITIONER

## 2021-03-18 PROCEDURE — 80048 BASIC METABOLIC PNL TOTAL CA: CPT | Performed by: NURSE PRACTITIONER

## 2021-03-18 PROCEDURE — 250N000011 HC RX IP 250 OP 636: Performed by: PEDIATRICS

## 2021-03-18 PROCEDURE — 99215 OFFICE O/P EST HI 40 MIN: CPT | Performed by: NURSE PRACTITIONER

## 2021-03-18 PROCEDURE — 85610 PROTHROMBIN TIME: CPT | Performed by: NURSE PRACTITIONER

## 2021-03-18 PROCEDURE — 250N000011 HC RX IP 250 OP 636

## 2021-03-18 PROCEDURE — 36591 DRAW BLOOD OFF VENOUS DEVICE: CPT

## 2021-03-18 PROCEDURE — 84100 ASSAY OF PHOSPHORUS: CPT | Performed by: NURSE PRACTITIONER

## 2021-03-18 PROCEDURE — 83735 ASSAY OF MAGNESIUM: CPT | Performed by: NURSE PRACTITIONER

## 2021-03-18 RX ORDER — HEPARIN SODIUM,PORCINE 10 UNIT/ML
VIAL (ML) INTRAVENOUS
Status: COMPLETED
Start: 2021-03-18 | End: 2021-03-18

## 2021-03-18 RX ORDER — DIPHENHYDRAMINE HYDROCHLORIDE 50 MG/ML
50 INJECTION INTRAMUSCULAR; INTRAVENOUS EVERY 6 HOURS PRN
Status: CANCELLED
Start: 2021-03-18

## 2021-03-18 RX ORDER — ACETAMINOPHEN 325 MG/1
650 TABLET ORAL ONCE
Status: CANCELLED
Start: 2021-03-18 | End: 2021-03-18

## 2021-03-18 RX ORDER — HEPARIN SODIUM,PORCINE 10 UNIT/ML
5-10 VIAL (ML) INTRAVENOUS
Status: DISCONTINUED | OUTPATIENT
Start: 2021-03-18 | End: 2021-03-18 | Stop reason: HOSPADM

## 2021-03-18 RX ORDER — HEPARIN SODIUM,PORCINE 10 UNIT/ML
5-10 VIAL (ML) INTRAVENOUS EVERY 24 HOURS
Status: DISCONTINUED | OUTPATIENT
Start: 2021-03-18 | End: 2021-03-18 | Stop reason: HOSPADM

## 2021-03-18 RX ADMIN — HEPARIN, PORCINE (PF) 10 UNIT/ML INTRAVENOUS SYRINGE 5 ML: at 10:07

## 2021-03-18 RX ADMIN — HEPARIN, PORCINE (PF) 10 UNIT/ML INTRAVENOUS SYRINGE 5 ML: at 10:08

## 2021-03-18 RX ADMIN — Medication 5 ML: at 10:08

## 2021-03-18 ASSESSMENT — PAIN SCALES - GENERAL: PAINLEVEL: NO PAIN (0)

## 2021-03-18 NOTE — PROGRESS NOTES
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 He was discharged from the hospital earlier this week and returns to clinic this morning with his mother for labwork, exam and possible platelet transfusion. He is engrafted with no signs of GVHD. His hemoglobin is stable, he has required GCSF every few days and is requiring daily platelet transfusions.     Isael reports that he has been feeling well since discharge and feels better each day.  He denies nausea and is only taking his Ativan prn and not scheduled. Shoulder pain continues to improve, only using Oxycodone PRNs occasionally. TPN/IL continues at 12h overnight, appetite remains minimal but he is interested in food now. Medications administered without issue.  Stools liquid in consistency, 2-3x daily and unchanged over past week. Completed oral vancomycin course 3/15 for recent C Difficile infection.  Denies rash, headache, fever, cough, congestion, or other concerns.    Review of Systems: Pertinent positives include those mentioned in interval events. A complete review of systems was performed and is otherwise negative.      Medications:  Itraconazole 200 mg PO BID  Protonix 40 mg PO BID  Tacrolimus 0.5 mg PO BID  Valtrex 1g PO TID  PRNs:  Ativan 1 mg PO BID PRN  Oxycodone 5 mg q6h PRN    Physical Exam:  Vital Signs for Peds 3/18/2021   SYSTOLIC 100   DIASTOLIC 68   PULSE 86   TEMPERATURE 98.3   RESPIRATIONS 24   WEIGHT (kg) 64.7 kg   HEIGHT (cm)    BMI    pain    O2 100     GEN: Sitting on exam bed, smiling, talkative, well appearing. Mother present.   HEENT: alopecia, NC/AT, PER, MMM, nares patent.   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     Isael continues to recover and feel better each day. Pain and nausea minimal, neutrophil engrafted, has needed daily platelet transfusions, however level 19K today. Remains on TPN, starting to show interest in food.    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 S/p preparative regimen per [id number] with Thiotepa (days -7 trough -6), Fludarabine (days -5 through -3), Busulfan (days -5 through -3), ATG (days -4 through -2) followed by UCBT (2/16).  Neutrophil recovery acheived day +20. Day +21 peripheral VNTRs (3/9) reveal 100% donor chimerism  - Bone marrow biopsy (3/11): flow & morphology negative, FISH, chromosomes and NGS pending.     # Risk for GVHD:   - Continue Tacrolimus, goal 5-10.  Level 11.2 on 3/17 and dose reduced, repeat level tomorrow 3/19.   - MMF discontinued prior to discharge     FEN/Renal:  # Risk for malnutrition: Tried pizza last night.  - Continue TPN/IL (cycled to 12 hours)   - Regular diet as tolerated  - Dietician to follow     # Electrolyte abnormalities:    - Check daily electrolytes   - Replace/adjust in TPN as indicated     # Risk for renal dysfunction and fluid overload: GFR (1/28): 119 mls/min  - Monitor daily weights     # Risk for aHUS/TA-TMA:    - Monitor LDH qMon/Thurs: 186 (3/17)  - Monitor urine protein/creatinine qTuesday: 0.14 (3/17)     Pulmonary:  # Risk for pulmonary insufficiency: No current concerns     Cardiovascular:  # Asymptomatic WPW syndrome (diagnosed 2018): no interventions to date. Most recent EKG (1/27). ECHO (1/26) normal, EF 64%. 24h Zio patch holter  (1/27-1/28) revealed c/w WPW with no ventricular ectopies present. Cardiology following. BNP elevated 664 (2/16) likely attributed to fluid shifts per cardiology. BNP threshold of concern is >5000. Weekly BNPs/Troponins monitored inpatient, now discontinued as an outpatient.  - Follow up ECHO in 6 mos (7/2021)     # Risk for hypertension secondary to medications: normotensive      Heme:   # Pancytopenia secondary to chemotherapy  - Transfuse for hemoglobin < 8 (experienced mild dizziness and malaise when hgb in 7s); platelet < 10K.   - Hx of transfusion reactions (hives): Premedicate with tylenol and benadryl prior to pRBCs and plts  - GCSF daily until ANC >/= 2500 x 3 consecutive days completed 3/17. Now prn for ANC < 1000.     Infectious Disease:  # Neutropenic fever (3/8): s/p Cefepime  - Follow blood cultures (3/8 yeast) from CVC and port-a-cath: NGTD     # C.Difficile colitis (2/17): PO vanco (2/17-2/26), restarted due to increased frequency and volume of watery stool with abdominal cramping.   - S/P 10 day course oral vanco (3/5-3/15)   - 2-3 loose stools per day, unchanged for several days.     # Risk for infection given immunocompromised status with need for prophylaxis:  - Viral (CMV/HSV sero positive): HD Acyclovir. Weekly CMV neg 3/9, recheck pending today.  - Fungal: continue Itraconazole (started 3/11)  - PCP: Pentamidine given 2/15. Bactrim beginning Monday 3/29 (no hx of adverse reaction).  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      Past infections: C. Diff (November 2020)     GI:   # Reflux:  - BID protonix   - PRN Tums      # Nausea: Denies  - Ativan was scheduled BID, Isael reports he is taking it prn.     # Risk for VOD: most recent abd US (2/22) revealed stable, mild hepatomegaly (previously enrolled in elastography study)  - Ursodiol TID discontinued at discharge     Neuro:  # Mucositis/abdominal pain: Denies  pain, infrequent use of oxycodone.  - Tylenol/Oxycodone PRN     # Left lateral shoulder/deltoid pain (3/6): c/w MSK pain, decreased ROM, pain on palpation. Much improved.   - continue PT      # History of significant spinal headaches: consider using Candelario needle with lumbar punctures     # CRS history. 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 No CRS, neurotoxicity or infectious complications from [place] CAR-T.      Fertility: Sperm collected previously collected for fertility preservation    Disposition: RTC 3/19 for labs and exam with Dr. Zavala, possible platelet transfusion, Tacrolimus level.    BENJA Chen  [facility]  Pediatric Blood and Marrow Transplant    I spent a total of 45 minutes with Artemio Nino on the date of encounter doing chart review, history and exam, review of labs/imaging, discussion with the family, documentation and further activities as noted above.     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

## 2021-03-18 NOTE — PROGRESS NOTES
Infusion Nursing Note    Artemio Nino Presents to [facility] today for: possible platelets    Due to :    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)  S/P allogeneic bone marrow transplant (H)  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)  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)    Intravenous Access/Labs: Labs drawn per purple lumen by RN.     Infusion Note: Per ordered parameters no infusion needed today. Both red and purple lumens heparin locked.     Discharge Plan:  Pt left [facility] in stable condition with mother.

## 2021-03-18 NOTE — PROGRESS NOTES
This is a recent snapshot of the patient's [facility] medical record.  For current drug dose and complete information and questions, call [phone] or In Basket pool, fv home infusion ([id number])  CSN Number:  [id number]

## 2021-03-19 ENCOUNTER — HOME INFUSION (PRE-WILLOW HOME INFUSION) (OUTPATIENT)
Dept: PHARMACY | Facility: CLINIC | Age: 21
End: 2021-03-19

## 2021-03-19 ENCOUNTER — INFUSION THERAPY VISIT (OUTPATIENT)
Dept: INFUSION THERAPY | Facility: CLINIC | Age: 21
End: 2021-03-19
Attending: PEDIATRICS
Payer: COMMERCIAL

## 2021-03-19 ENCOUNTER — ONCOLOGY VISIT (OUTPATIENT)
Dept: TRANSPLANT | Facility: CLINIC | Age: 21
End: 2021-03-19
Attending: PEDIATRICS
Payer: COMMERCIAL

## 2021-03-19 VITALS
OXYGEN SATURATION: 100 % | SYSTOLIC BLOOD PRESSURE: 99 MMHG | RESPIRATION RATE: 16 BRPM | HEART RATE: 73 BPM | TEMPERATURE: 98.3 F | DIASTOLIC BLOOD PRESSURE: 65 MMHG

## 2021-03-19 VITALS
TEMPERATURE: 98.6 F | BODY MASS INDEX: 21.09 KG/M2 | HEIGHT: 69 IN | WEIGHT: 142.42 LBS | SYSTOLIC BLOOD PRESSURE: 105 MMHG | DIASTOLIC BLOOD PRESSURE: 72 MMHG | RESPIRATION RATE: 18 BRPM | OXYGEN SATURATION: 100 % | HEART RATE: 84 BPM

## 2021-03-19 DIAGNOSIS — Z94.81 STATUS POST BONE MARROW TRANSPLANT (H): ICD-10-CM

## 2021-03-19 DIAGNOSIS — C91.02 ACUTE LYMPHOBLASTIC LEUKEMIA (ALL) IN RELAPSE (H): ICD-10-CM

## 2021-03-19 DIAGNOSIS — Z51.81 ENCOUNTER FOR THERAPEUTIC DRUG MONITORING: Primary | ICD-10-CM

## 2021-03-19 DIAGNOSIS — Z94.81 S/P ALLOGENEIC BONE MARROW TRANSPLANT (H): ICD-10-CM

## 2021-03-19 DIAGNOSIS — C91.00 ALL (ACUTE LYMPHOBLASTIC LEUKEMIA OF INFANT) (H): ICD-10-CM

## 2021-03-19 DIAGNOSIS — C91.01 ACUTE LYMPHOBLASTIC LEUKEMIA (ALL) IN REMISSION (H): Primary | ICD-10-CM

## 2021-03-19 LAB
ANION GAP SERPL CALCULATED.3IONS-SCNC: 4 MMOL/L (ref 3–14)
BASOPHILS # BLD AUTO: 0 10E9/L (ref 0–0.2)
BASOPHILS NFR BLD AUTO: 0 %
BLD PROD TYP BPU: NORMAL
BLD PROD TYP BPU: NORMAL
BLD UNIT ID BPU: 0
BLOOD PRODUCT CODE: NORMAL
BPU ID: NORMAL
BUN SERPL-MCNC: 22 MG/DL (ref 7–30)
CALCIUM SERPL-MCNC: 9.2 MG/DL (ref 8.5–10.1)
CHLORIDE SERPL-SCNC: 106 MMOL/L (ref 94–109)
CO2 SERPL-SCNC: 26 MMOL/L (ref 20–32)
CREAT SERPL-MCNC: 0.6 MG/DL (ref 0.66–1.25)
DIFFERENTIAL METHOD BLD: ABNORMAL
EOSINOPHIL # BLD AUTO: 0 10E9/L (ref 0–0.7)
EOSINOPHIL NFR BLD AUTO: 0.9 %
ERYTHROCYTE [DISTWIDTH] IN BLOOD BY AUTOMATED COUNT: 12 % (ref 10–15)
GFR SERPL CREATININE-BSD FRML MDRD: >90 ML/MIN/{1.73_M2}
GLUCOSE SERPL-MCNC: 120 MG/DL (ref 70–99)
HCT VFR BLD AUTO: 31.6 % (ref 40–53)
HGB BLD-MCNC: 10.6 G/DL (ref 13.3–17.7)
LAB SCANNED RESULT: NORMAL
LYMPHOCYTES # BLD AUTO: 1.3 10E9/L (ref 0.8–5.3)
LYMPHOCYTES NFR BLD AUTO: 29.6 %
MAGNESIUM SERPL-MCNC: 2 MG/DL (ref 1.6–2.3)
MCH RBC QN AUTO: 29.3 PG (ref 26.5–33)
MCHC RBC AUTO-ENTMCNC: 33.5 G/DL (ref 31.5–36.5)
MCV RBC AUTO: 87 FL (ref 78–100)
METAMYELOCYTES # BLD: 0 10E9/L
METAMYELOCYTES NFR BLD MANUAL: 0.9 %
MONOCYTES # BLD AUTO: 1.3 10E9/L (ref 0–1.3)
MONOCYTES NFR BLD AUTO: 30.4 %
NEUTROPHILS # BLD AUTO: 1.6 10E9/L (ref 1.6–8.3)
NEUTROPHILS NFR BLD AUTO: 38.2 %
NUM BPU REQUESTED: 1
PHOSPHATE SERPL-MCNC: 3.7 MG/DL (ref 2.5–4.5)
PLATELET # BLD AUTO: 11 10E9/L (ref 150–450)
PLATELET # BLD EST: ABNORMAL 10*3/UL
POTASSIUM SERPL-SCNC: 4.6 MMOL/L (ref 3.4–5.3)
RBC # BLD AUTO: 3.62 10E12/L (ref 4.4–5.9)
RBC MORPH BLD: NORMAL
SODIUM SERPL-SCNC: 136 MMOL/L (ref 133–144)
TACROLIMUS BLD-MCNC: 10.4 UG/L (ref 5–15)
TME LAST DOSE: NORMAL H
TRANSFUSION STATUS PATIENT QL: NORMAL
TRANSFUSION STATUS PATIENT QL: NORMAL
WBC # BLD AUTO: 4.3 10E9/L (ref 4–11)

## 2021-03-19 PROCEDURE — 250N000011 HC RX IP 250 OP 636: Performed by: NURSE PRACTITIONER

## 2021-03-19 PROCEDURE — 250N000013 HC RX MED GY IP 250 OP 250 PS 637: Performed by: NURSE PRACTITIONER

## 2021-03-19 PROCEDURE — 36592 COLLECT BLOOD FROM PICC: CPT | Performed by: NURSE PRACTITIONER

## 2021-03-19 PROCEDURE — 85025 COMPLETE CBC W/AUTO DIFF WBC: CPT | Performed by: NURSE PRACTITIONER

## 2021-03-19 PROCEDURE — 36430 TRANSFUSION BLD/BLD COMPNT: CPT

## 2021-03-19 PROCEDURE — 99215 OFFICE O/P EST HI 40 MIN: CPT | Mod: GC | Performed by: PEDIATRICS

## 2021-03-19 PROCEDURE — 84100 ASSAY OF PHOSPHORUS: CPT | Performed by: NURSE PRACTITIONER

## 2021-03-19 PROCEDURE — 250N000011 HC RX IP 250 OP 636: Performed by: PEDIATRICS

## 2021-03-19 PROCEDURE — P9037 PLATE PHERES LEUKOREDU IRRAD: HCPCS | Performed by: NURSE PRACTITIONER

## 2021-03-19 PROCEDURE — 80048 BASIC METABOLIC PNL TOTAL CA: CPT | Performed by: NURSE PRACTITIONER

## 2021-03-19 PROCEDURE — G0463 HOSPITAL OUTPT CLINIC VISIT: HCPCS

## 2021-03-19 PROCEDURE — 83735 ASSAY OF MAGNESIUM: CPT | Performed by: NURSE PRACTITIONER

## 2021-03-19 PROCEDURE — 258N000003 HC RX IP 258 OP 636: Performed by: PEDIATRICS

## 2021-03-19 PROCEDURE — 96374 THER/PROPH/DIAG INJ IV PUSH: CPT

## 2021-03-19 PROCEDURE — 80197 ASSAY OF TACROLIMUS: CPT | Performed by: NURSE PRACTITIONER

## 2021-03-19 RX ORDER — ACETAMINOPHEN 325 MG/1
650 TABLET ORAL ONCE
Status: COMPLETED | OUTPATIENT
Start: 2021-03-19 | End: 2021-03-19

## 2021-03-19 RX ORDER — ACETAMINOPHEN 325 MG/1
TABLET ORAL
Status: DISPENSED
Start: 2021-03-19 | End: 2021-03-19

## 2021-03-19 RX ORDER — DIPHENHYDRAMINE HYDROCHLORIDE 50 MG/ML
50 INJECTION INTRAMUSCULAR; INTRAVENOUS EVERY 6 HOURS PRN
Status: CANCELLED
Start: 2021-03-19

## 2021-03-19 RX ORDER — HEPARIN SODIUM,PORCINE 10 UNIT/ML
VIAL (ML) INTRAVENOUS
Status: DISPENSED
Start: 2021-03-19 | End: 2021-03-20

## 2021-03-19 RX ORDER — DIPHENHYDRAMINE HYDROCHLORIDE 50 MG/ML
INJECTION INTRAMUSCULAR; INTRAVENOUS
Status: DISPENSED
Start: 2021-03-19 | End: 2021-03-19

## 2021-03-19 RX ORDER — ACETAMINOPHEN 325 MG/1
650 TABLET ORAL ONCE
Status: CANCELLED
Start: 2021-03-19 | End: 2021-03-19

## 2021-03-19 RX ORDER — HEPARIN SODIUM,PORCINE 10 UNIT/ML
2-4 VIAL (ML) INTRAVENOUS EVERY 24 HOURS
Status: DISCONTINUED | OUTPATIENT
Start: 2021-03-19 | End: 2021-03-19 | Stop reason: HOSPADM

## 2021-03-19 RX ORDER — DIPHENHYDRAMINE HYDROCHLORIDE 50 MG/ML
50 INJECTION INTRAMUSCULAR; INTRAVENOUS EVERY 6 HOURS PRN
Status: DISCONTINUED | OUTPATIENT
Start: 2021-03-19 | End: 2021-03-19 | Stop reason: HOSPADM

## 2021-03-19 RX ADMIN — DIPHENHYDRAMINE HYDROCHLORIDE 50 MG: 50 INJECTION, SOLUTION INTRAMUSCULAR; INTRAVENOUS at 11:35

## 2021-03-19 RX ADMIN — SODIUM CHLORIDE, PRESERVATIVE FREE 4 ML: 5 INJECTION INTRAVENOUS at 15:54

## 2021-03-19 RX ADMIN — ACETAMINOPHEN 650 MG: 325 TABLET, FILM COATED ORAL at 11:36

## 2021-03-19 RX ADMIN — SODIUM CHLORIDE 50 ML: 9 INJECTION, SOLUTION INTRAVENOUS at 11:49

## 2021-03-19 ASSESSMENT — MIFFLIN-ST. JEOR: SCORE: 1637.87

## 2021-03-19 NOTE — PHARMACY-CONSULT NOTE
Outpatient IV Medication Monitoring Note     Artemio's TPN formula, labs, and nutritional status were reviewed today.       Based on the results the following changes are recommended to the TPN:  decrease potassium  This was discussed with Viky Zavala MD and communicated to [facility].    Artemio will be in clinic Monday 3/22 for labs and reassessment.      The following reflects the new TPN formula.  Dosing Weight:  64.5 kg  Volume:  1800 mL, cycled over 12 hours  Protein:  100 gm  Dextrose:  236 gm  Lipids:  240 mL/day     Sodium:  80 mEq/day  Potassium: 15 mEq/day (decreased from 25 mEq/day   Calcium:  10 mEq/day  Magnesium:  45 mEq/day  Phosphorus:  zero  Chloride:Acetate ratio:  1:2  Trace elements with Selenium:  standard  Multivitamins:  standard  Vitamin K:  5 mg       Pharmacy will continue to follow.   Erica May, PharmD

## 2021-03-19 NOTE — LETTER
3/19/2021         RE: Artemio Nino  [address] [id number]      2021    Dr. Manjinder Paniagua  [address] [place] [id number]    Name: Artemio Nino  MRN: [id number]  : 2000    Dear Dr. Paniagua,    We had the pleasure of seeing your patient Artemio Nino in the [facility] for routine post-BMT follow-up. 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 He has been seen this week in clinic and has required intermittent platelet transfusions. He is here today for exam and labs. He is accompanied by his Dad, Ziggy.     Isael is doing well at home. He remains on full TPN/IL (over 12 hours). He has a desire to try new foods but also notes that he has decreased taste buds. He prefers spicy foods. He tried pepperoni pizza two days ago and tried chicken fried rice yesterday. He eats some apple sauce from time to time. He does not feel that his appetite has improved significantly enough to make changes to his TPN/IL. He remains on protonix BID. He has dry skin but notes that is a usual concern post therapies and he will use lotions. His diarrhea is improving. He has a known history of c.diff and was treated with PO vancomycin x2. Of 3 stools he has a day, 2 are now diarrhea and 1 is soft. This is an improvement from all diarrhea stools. He has good energy and is able to get upstairs without any issue. He likes to watch TV and play video games. He is interested in when he can see his friends and extended family. No issues sleeping. Shoulder pain has completley resolved. He takes ativan twice a day while he is on TPN/IL to help with nausea but has not required tylenol or oxycodone for home. He note that occasionally when he brushes his teeth he has some bleeding in the gums.    Review of Systems: Pertinent positives include those mentioned in interval  "events. A complete review of systems was performed and is otherwise negative.      Medications:  Itraconazole 200 mg PO BID  Protonix 40 mg PO BID  Tacrolimus 0.5 mg PO BID  Valtrex 1g PO TID  Ativan 1 mg PO BID    Physical Exam:  /72 (BP Location: Right arm, Patient Position: Chair, Cuff Size: Adult Regular)   Pulse 84   Temp 98.6  F (37  C) (Oral)   Resp 18   Ht 1.755 m (5' 9.09\")   Wt 64.6 kg (142 lb 6.7 oz)   SpO2 100%   BMI 20.97 kg/m     GEN: Sitting in chair, in good spirits. Talkative with examiner, cooperative  HEENT: Allopecia, NC/AT, Pupils equal and reactive, MMM - no sores or actively bleeding gums, nares patent.   CARD: RRR, no m/r/g, normal S1/S2. Port in place on R side of chest - not accessed. Galvan in place on L side of chest, dressing is c/d/i.  RESP: CTAB, normal WOB, no adventitious sounds  ABD: Soft, NT/ND, flat, NABS  EXTREM: MAEE, WWP  SKIN: Dry, no rashes, no lesions  NEURO: Talkative, CNII-XII grossly intact    Labs:    Ref. Range 3/19/2021 09:46   Sodium Latest Ref Range: 133 - 144 mmol/L 136   Potassium Latest Ref Range: 3.4 - 5.3 mmol/L 4.6   Chloride Latest Ref Range: 94 - 109 mmol/L 106   Carbon Dioxide Latest Ref Range: 20 - 32 mmol/L 26   Urea Nitrogen Latest Ref Range: 7 - 30 mg/dL 22   Creatinine Latest Ref Range: 0.66 - 1.25 mg/dL 0.60 (L)   GFR Estimate Latest Ref Range: >60 mL/min/1.73_m2 >90   GFR Estimate If Black Latest Ref Range: >60 mL/min/1.73_m2 >90   Calcium Latest Ref Range: 8.5 - 10.1 mg/dL 9.2   Anion Gap Latest Ref Range: 3 - 14 mmol/L 4   Magnesium Latest Ref Range: 1.6 - 2.3 mg/dL 2.0   Phosphorus Latest Ref Range: 2.5 - 4.5 mg/dL 3.7      Ref. Range 3/19/2021 09:46   Glucose Latest Ref Range: 70 - 99 mg/dL 120 (H)   WBC Latest Ref Range: 4.0 - 11.0 10e9/L 4.3   Hemoglobin Latest Ref Range: 13.3 - 17.7 g/dL 10.6 (L)   Hematocrit Latest Ref Range: 40.0 - 53.0 % 31.6 (L)   Platelet Count Latest Ref Range: 150 - 450 10e9/L 11 (LL)   RBC Count Latest Ref " Range: 4.4 - 5.9 10e12/L 3.62 (L)   MCV Latest Ref Range: 78 - 100 fl 87   MCH Latest Ref Range: 26.5 - 33.0 pg 29.3   MCHC Latest Ref Range: 31.5 - 36.5 g/dL 33.5   RDW Latest Ref Range: 10.0 - 15.0 % 12.0   Diff Method Unknown Manual Differential   % Neutrophils Latest Units: % 38.2   % Lymphocytes Latest Units: % 29.6   % Monocytes Latest Units: % 30.4   % Eosinophils Latest Units: % 0.9   % Basophils Latest Units: % 0.0   % Metamyelocytes Latest Units: % 0.9   Absolute Neutrophil Latest Ref Range: 1.6 - 8.3 10e9/L 1.6   Absolute Lymphocytes Latest Ref Range: 0.8 - 5.3 10e9/L 1.3   Absolute Monocytes Latest Ref Range: 0.0 - 1.3 10e9/L 1.3   Absolute Eosinophils Latest Ref Range: 0.0 - 0.7 10e9/L 0.0   Absolute Basophils Latest Ref Range: 0.0 - 0.2 10e9/L 0.0   Absolute Metamyelocytes Latest Ref Range: 0 10e9/L 0.0   RBC Morphology Unknown Normal   Platelet Estimate Unknown Confirming automated cell count     Assessment/Plan:  Artemio is a 21 yo male with 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 His post-transplant complications have included c.difficile (received two courses of PO Vanc) and anorexia (on full TPN/IL). Isael is slowly improving now that he is outpatient. He still requires platelets (to receive today) and intermittently GCSF. His diarrhea is improving and his appetite is very very slowly improving. His day+21 Next-Gene Sequencing from the bone marrow is negative which is excellent news.     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) and PLAT-05 (CAR19/22) at [place] (loss of CD22 CAR and rejection of CD19 directed CAR without evidence of disease day +21). Of note, his initial post CAR-T therapy was complicated by Grade III CRS necessitating Tociluzimab x3, dopamine pressor support, and steroids. No CRS,  neurotoxicity or infectious complications from [place] CAR-T. He received preparative regimen per [id number] with Thiotepa, Fludarabine, Busulfan, and ATG and received a UCBT on 2/16/21. He engrafted on day +20. His Day +21 peripheral VNTRs (3/9) and bone marrow chimerism (3/11) reveals 100% donor chimerism and his flow, morphology, and NGS is negative. FISH and chromosomes pending.     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 Continue Tacrolimus 0.5mg PO BID, goal 5-10. Level elevated, but would need to switch to liquid.  Will repeat the level on Monday.      FEN/Renal: Continue TPN/IL (cycled to 12 hours) - pharmacy to decrease K in the TPN today. Regular diet as tolerated. Dietician to follow. Weekly screening for aHUS/TA-TMA until day+60: LDH qMon/Thurs: 186 (3/17); urine protein/creatinine qTuesday: 0.14 (3/17) - no concerns currently.     Cardiovascular: History of asymptomatic WPW syndrome (diagnosed 2018): no interventions to date. Most recent EKG (1/27). ECHO (1/26) normal, EF 64%. 24h Zio patch holter (1/27-1/28) revealed c/w WPW with no ventricular ectopies present. Cardiology following. BNP elevated 664 (2/16) likely attributed to fluid shifts per cardiology. BNP threshold of concern is >5000. Follow up ECHO in 6 mos (7/2021). Normotensive.     Heme: Transfuse for hemoglobin < 8 (experienced mild dizziness and malaise when hgb in 7s); platelet < 10K. Hx of transfusion reactions (hives): Premedicate with tylenol and benadryl prior to pRBCs and plts. S/p GCSF daily until ANC >/= 2500 x 3 consecutive days completed 3/17. Now prn for ANC < 1000.      Infectious Disease: History of neutropenic fever (3/8): s/p Cefepime with negative blood cultures (3/8 yeast) from CVC and port-a-cath. History of C.Difficile colitis (2/17); treated with PO Vanc (2/17-2/26) and (3/5-3/15). Diarrhea improving. Continue Viral prophylaxis (CMV/HSV sero positive): HD Acyclovir. Weekly CMV neg 3/18. Continue Fungal  prophylaxis with Itraconazole (started 3/11). Needs Itraconazole level drawn 3/22 (already ordered). Received Pentamidine 2/15; Bactrim beginning Monday 3/22 (no hx of adverse reaction).    Immune: No history of hypogammaglobinemia: IgG level 839 (3/15), continue checking q2 weeks and replace if <400 (no hx replacement at [facility]). Does not require IVIG.     GI: Continue Protonix BID. No longer requiring TUMs prn. Continue ativan BID (can wean next week if PO improving). S/p Ursodiol for VOD prophylaxis (discontinued at discharge).Had been on the VOD study, but decided he wanted to discontinue because did not want to be NPO prior to scans.      Neuro: No longer utilizing oxycodone. L shoulder pain completely resolved. Of note, history of significant spinal headaches; consider using Candelario needle with lumbar punctures.      Fertility: Sperm collected previously for fertility preservation.    Disposition: RTC Monday 3/22 for exam, labs, and possible platelet transfusion with Lisa LOPEZ. RTC to see Dr. Zavala on Friday 3/26 for exam, labs and possible platelet transfusion. We discussed with Isael that he can see one or two friends as long as everyone wears masks the entire time.     Patient seen and examined with Pediatric BMT Attending Dr. Zavala.    Edd Jones MD MPH  Pediatric BMT Fellow  [facility]    Viky Zavala MD, PhD    Pediatric Blood and Marrow Transplant  [facility]    Total time spent on the following services on the date of the encounter: 50 minutes  Preparing to see patient, chart review, review of outside records, ordering medications, test, procedures, interpretation of labs, imaging and other tests, counseling and educating the patient/family/caregiver, documenting clinical information in the electronic or other health record, and communicating results to the patient/family/caregiver.     Viky BASS MD  PhD, saw this patient with the fellow and agree with the fellow s findings and plan of care as documented in the fellow s note.            Viky Zavala MD

## 2021-03-19 NOTE — PROGRESS NOTES
Infusion Nursing Note    Artemio Nino presents to the [facility] today for: platelet transfusion    Due to :    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)  S/P allogeneic bone marrow transplant (H)  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)    Intravenous Access/Labs: purple lumen of patients double lumen central line was used for access. Labs were obtained prior to infusion visit.     Coping:   Child Family Life: declined    Infusion Note: Patient denies any fevers and/or infections. Pre-medication of PO Tylenol and IV Benadryl was given prior to the start of the infusion. Infusion completed without complication. Vital signs remained stable throughout. Blood return noted pre/post infusion. Purple lumen of patients central line was Heparin locked. Red lumen was Heparin locked in Lab and patients line was not due to be re-flushed. Patient was seen and assessed by Dr.Stefanski KOROMA while in clinic.      Discharge Plan:   Patient verbalized understanding of discharge instructions. Patient left the [facility] with Dad in stable condition.

## 2021-03-19 NOTE — PROGRESS NOTES
2021    Dr. Manjinder Paniagua  [facility] [place] [address]    Name: Artemio Nino  MRN: [id number]  : 2000    Dear Dr. Paniagua,    We had the pleasure of seeing your patient Artemio Nino in the [facility] for routine post-BMT follow-up. 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 He has been seen this week in clinic and has required intermittent platelet transfusions. He is here today for exam and labs. He is accompanied by his Dad, Ziggy.     Isael is doing well at home. He remains on full TPN/IL (over 12 hours). He has a desire to try new foods but also notes that he has decreased taste buds. He prefers spicy foods. He tried pepperoni pizza two days ago and tried chicken fried rice yesterday. He eats some apple sauce from time to time. He does not feel that his appetite has improved significantly enough to make changes to his TPN/IL. He remains on protonix BID. He has dry skin but notes that is a usual concern post therapies and he will use lotions. His diarrhea is improving. He has a known history of c.diff and was treated with PO vancomycin x2. Of 3 stools he has a day, 2 are now diarrhea and 1 is soft. This is an improvement from all diarrhea stools. He has good energy and is able to get upstairs without any issue. He likes to watch TV and play video games. He is interested in when he can see his friends and extended family. No issues sleeping. Shoulder pain has completley resolved. He takes ativan twice a day while he is on TPN/IL to help with nausea but has not required tylenol or oxycodone for home. He note that occasionally when he brushes his teeth he has some bleeding in the gums.    Review of Systems: Pertinent positives include those mentioned in interval events. A complete review of systems was performed and is otherwise negative.   "    Medications:  Itraconazole 200 mg PO BID  Protonix 40 mg PO BID  Tacrolimus 0.5 mg PO BID  Valtrex 1g PO TID  Ativan 1 mg PO BID    Physical Exam:  /72 (BP Location: Right arm, Patient Position: Chair, Cuff Size: Adult Regular)   Pulse 84   Temp 98.6  F (37  C) (Oral)   Resp 18   Ht 1.755 m (5' 9.09\")   Wt 64.6 kg (142 lb 6.7 oz)   SpO2 100%   BMI 20.97 kg/m     GEN: Sitting in chair, in good spirits. Talkative with examiner, cooperative  HEENT: Allopecia, NC/AT, Pupils equal and reactive, MMM - no sores or actively bleeding gums, nares patent.   CARD: RRR, no m/r/g, normal S1/S2. Port in place on R side of chest - not accessed. Galvan in place on L side of chest, dressing is c/d/i.  RESP: CTAB, normal WOB, no adventitious sounds  ABD: Soft, NT/ND, flat, NABS  EXTREM: MAEE, WWP  SKIN: Dry, no rashes, no lesions  NEURO: Talkative, CNII-XII grossly intact    Labs:    Ref. Range 3/19/2021 09:46   Sodium Latest Ref Range: 133 - 144 mmol/L 136   Potassium Latest Ref Range: 3.4 - 5.3 mmol/L 4.6   Chloride Latest Ref Range: 94 - 109 mmol/L 106   Carbon Dioxide Latest Ref Range: 20 - 32 mmol/L 26   Urea Nitrogen Latest Ref Range: 7 - 30 mg/dL 22   Creatinine Latest Ref Range: 0.66 - 1.25 mg/dL 0.60 (L)   GFR Estimate Latest Ref Range: >60 mL/min/1.73_m2 >90   GFR Estimate If Black Latest Ref Range: >60 mL/min/1.73_m2 >90   Calcium Latest Ref Range: 8.5 - 10.1 mg/dL 9.2   Anion Gap Latest Ref Range: 3 - 14 mmol/L 4   Magnesium Latest Ref Range: 1.6 - 2.3 mg/dL 2.0   Phosphorus Latest Ref Range: 2.5 - 4.5 mg/dL 3.7      Ref. Range 3/19/2021 09:46   Glucose Latest Ref Range: 70 - 99 mg/dL 120 (H)   WBC Latest Ref Range: 4.0 - 11.0 10e9/L 4.3   Hemoglobin Latest Ref Range: 13.3 - 17.7 g/dL 10.6 (L)   Hematocrit Latest Ref Range: 40.0 - 53.0 % 31.6 (L)   Platelet Count Latest Ref Range: 150 - 450 10e9/L 11 (LL)   RBC Count Latest Ref Range: 4.4 - 5.9 10e12/L 3.62 (L)   MCV Latest Ref Range: 78 - 100 fl 87   MCH " Latest Ref Range: 26.5 - 33.0 pg 29.3   MCHC Latest Ref Range: 31.5 - 36.5 g/dL 33.5   RDW Latest Ref Range: 10.0 - 15.0 % 12.0   Diff Method Unknown Manual Differential   % Neutrophils Latest Units: % 38.2   % Lymphocytes Latest Units: % 29.6   % Monocytes Latest Units: % 30.4   % Eosinophils Latest Units: % 0.9   % Basophils Latest Units: % 0.0   % Metamyelocytes Latest Units: % 0.9   Absolute Neutrophil Latest Ref Range: 1.6 - 8.3 10e9/L 1.6   Absolute Lymphocytes Latest Ref Range: 0.8 - 5.3 10e9/L 1.3   Absolute Monocytes Latest Ref Range: 0.0 - 1.3 10e9/L 1.3   Absolute Eosinophils Latest Ref Range: 0.0 - 0.7 10e9/L 0.0   Absolute Basophils Latest Ref Range: 0.0 - 0.2 10e9/L 0.0   Absolute Metamyelocytes Latest Ref Range: 0 10e9/L 0.0   RBC Morphology Unknown Normal   Platelet Estimate Unknown Confirming automated cell count     Assessment/Plan:  Artemio is a 21 yo male with 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 His post-transplant complications have included c.difficile (received two courses of PO Vanc) and anorexia (on full TPN/IL). Isael is slowly improving now that he is outpatient. He still requires platelets (to receive today) and intermittently GCSF. His diarrhea is improving and his appetite is very very slowly improving. His day+21 Next-Gene Sequencing from the bone marrow is negative which is excellent news.     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 Of note, his initial post CAR-T therapy was complicated by Grade III CRS necessitating Tociluzimab x3, dopamine pressor support, and steroids. No CRS, neurotoxicity or infectious complications from [place] CAR-T. He received preparative  regimen per MT2013-09 with Thiotepa, Fludarabine, Busulfan, and ATG and received a UCBT on 2/16/21. He engrafted on day +20. His Day +21 peripheral VNTRs (3/9) and bone marrow chimerism (3/11) reveals 100% donor chimerism and his flow, morphology, and NGS is negative. FISH and chromosomes pending.     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 Continue Tacrolimus 0.5mg PO BID, goal 5-10. Level elevated, but would need to switch to liquid.  Will repeat the level on Monday.      FEN/Renal: Continue TPN/IL (cycled to 12 hours) - pharmacy to decrease K in the TPN today. Regular diet as tolerated. Dietician to follow. Weekly screening for aHUS/TA-TMA until day+60: LDH qMon/Thurs: 186 (3/17); urine protein/creatinine qTuesday: 0.14 (3/17) - no concerns currently.     Cardiovascular: History of asymptomatic WPW syndrome (diagnosed 2018): no interventions to date. Most recent EKG (1/27). ECHO (1/26) normal, EF 64%. 24h Zio patch holter (1/27-1/28) revealed c/w WPW with no ventricular ectopies present. Cardiology following. BNP elevated 664 (2/16) likely attributed to fluid shifts per cardiology. BNP threshold of concern is >5000. Follow up ECHO in 6 mos (7/2021). Normotensive.     Heme: Transfuse for hemoglobin < 8 (experienced mild dizziness and malaise when hgb in 7s); platelet < 10K. Hx of transfusion reactions (hives): Premedicate with tylenol and benadryl prior to pRBCs and plts. S/p GCSF daily until ANC >/= 2500 x 3 consecutive days completed 3/17. Now prn for ANC < 1000.      Infectious Disease: History of neutropenic fever (3/8): s/p Cefepime with negative blood cultures (3/8 yeast) from CVC and port-a-cath. History of C.Difficile colitis (2/17); treated with PO Vanc (2/17-2/26) and (3/5-3/15). Diarrhea improving. Continue Viral prophylaxis (CMV/HSV sero positive): HD Acyclovir. Weekly CMV neg 3/18. Continue Fungal prophylaxis with Itraconazole (started 3/11). Needs Itraconazole level drawn 3/22 (already  ordered). Received Pentamidine 2/15; Bactrim beginning Monday 3/22 (no hx of adverse reaction).    Immune: No history of hypogammaglobinemia: IgG level 839 (3/15), continue checking q2 weeks and replace if <400 (no hx replacement at [facility]). Does not require IVIG.     GI: Continue Protonix BID. No longer requiring TUMs prn. Continue ativan BID (can wean next week if PO improving). S/p Ursodiol for VOD prophylaxis (discontinued at discharge).Had been on the VOD study, but decided he wanted to discontinue because did not want to be NPO prior to scans.      Neuro: No longer utilizing oxycodone. L shoulder pain completely resolved. Of note, history of significant spinal headaches; consider using Candelario needle with lumbar punctures.      Fertility: Sperm collected previously for fertility preservation.    Disposition: RTC Monday 3/22 for exam, labs, and possible platelet transfusion with Lisa JOHN. RTC to see Dr. Zavala on Friday 3/26 for exam, labs and possible platelet transfusion. We discussed with Isael that he can see one or two friends as long as everyone wears masks the entire time.     Patient seen and examined with Pediatric BMT Attending Dr. Zavala.    Edd Jones MD MPH  Pediatric BMT Fellow  [facility]    Viky Zavala MD, PhD    Pediatric Blood and Marrow Transplant  [facility]    Total time spent on the following services on the date of the encounter: 50 minutes  Preparing to see patient, chart review, review of outside records, ordering medications, test, procedures, interpretation of labs, imaging and other tests, counseling and educating the patient/family/caregiver, documenting clinical information in the electronic or other health record, and communicating results to the patient/family/caregiver.     IViky MD PhD, saw this patient with the fellow and agree with the fellow s findings and plan of care  as documented in the fellow s note.

## 2021-03-19 NOTE — PROGRESS NOTES
This is a recent snapshot of the patient's [facility] medical record.  For current drug dose and complete information and questions, call [phone] or In Basket pool, fv home infusion ([id number])  CSN Number:  [id number]

## 2021-03-19 NOTE — NURSING NOTE
"Chief Complaint   Patient presents with     RECHECK     ALL     Vitals:    03/19/21 1005   BP: 105/72   BP Location: Right arm   Patient Position: Chair   Cuff Size: Adult Regular   Pulse: 84   Resp: 18   Temp: 98.6  F (37  C)   TempSrc: Oral   SpO2: 100%   Weight: 142 lb 6.7 oz (64.6 kg)   Height: 5' 9.09\" (175.5 cm)     Radha Rutledge LPN  March 19, 2021  "

## 2021-03-19 NOTE — LETTER
3/19/2021      RE: Artemio Nino  [address] [address]       2021    Dr. Manjinder Paniagua  [address] [place] [id number]    Name: Artemio Nino  MRN: [id number]  : 2000    Dear Dr. Paniagua,    We had the pleasure of seeing your patient Artemio Nino in the [facility] for routine post-BMT follow-up. 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 He has been seen this week in clinic and has required intermittent platelet transfusions. He is here today for exam and labs. He is accompanied by his Dad, Ziggy.     Isael is doing well at home. He remains on full TPN/IL (over 12 hours). He has a desire to try new foods but also notes that he has decreased taste buds. He prefers spicy foods. He tried pepperoni pizza two days ago and tried chicken fried rice yesterday. He eats some apple sauce from time to time. He does not feel that his appetite has improved significantly enough to make changes to his TPN/IL. He remains on protonix BID. He has dry skin but notes that is a usual concern post therapies and he will use lotions. His diarrhea is improving. He has a known history of c.diff and was treated with PO vancomycin x2. Of 3 stools he has a day, 2 are now diarrhea and 1 is soft. This is an improvement from all diarrhea stools. He has good energy and is able to get upstairs without any issue. He likes to watch TV and play video games. He is interested in when he can see his friends and extended family. No issues sleeping. Shoulder pain has completley resolved. He takes ativan twice a day while he is on TPN/IL to help with nausea but has not required tylenol or oxycodone for home. He note that occasionally when he brushes his teeth he has some bleeding in the gums.    Review of Systems: Pertinent positives include those mentioned in interval events. A  "complete review of systems was performed and is otherwise negative.      Medications:  Itraconazole 200 mg PO BID  Protonix 40 mg PO BID  Tacrolimus 0.5 mg PO BID  Valtrex 1g PO TID  Ativan 1 mg PO BID    Physical Exam:  /72 (BP Location: Right arm, Patient Position: Chair, Cuff Size: Adult Regular)   Pulse 84   Temp 98.6  F (37  C) (Oral)   Resp 18   Ht 1.755 m (5' 9.09\")   Wt 64.6 kg (142 lb 6.7 oz)   SpO2 100%   BMI 20.97 kg/m     GEN: Sitting in chair, in good spirits. Talkative with examiner, cooperative  HEENT: Allopecia, NC/AT, Pupils equal and reactive, MMM - no sores or actively bleeding gums, nares patent.   CARD: RRR, no m/r/g, normal S1/S2. Port in place on R side of chest - not accessed. Galvan in place on L side of chest, dressing is c/d/i.  RESP: CTAB, normal WOB, no adventitious sounds  ABD: Soft, NT/ND, flat, NABS  EXTREM: MAEE, WWP  SKIN: Dry, no rashes, no lesions  NEURO: Talkative, CNII-XII grossly intact    Labs:    Ref. Range 3/19/2021 09:46   Sodium Latest Ref Range: 133 - 144 mmol/L 136   Potassium Latest Ref Range: 3.4 - 5.3 mmol/L 4.6   Chloride Latest Ref Range: 94 - 109 mmol/L 106   Carbon Dioxide Latest Ref Range: 20 - 32 mmol/L 26   Urea Nitrogen Latest Ref Range: 7 - 30 mg/dL 22   Creatinine Latest Ref Range: 0.66 - 1.25 mg/dL 0.60 (L)   GFR Estimate Latest Ref Range: >60 mL/min/1.73_m2 >90   GFR Estimate If Black Latest Ref Range: >60 mL/min/1.73_m2 >90   Calcium Latest Ref Range: 8.5 - 10.1 mg/dL 9.2   Anion Gap Latest Ref Range: 3 - 14 mmol/L 4   Magnesium Latest Ref Range: 1.6 - 2.3 mg/dL 2.0   Phosphorus Latest Ref Range: 2.5 - 4.5 mg/dL 3.7      Ref. Range 3/19/2021 09:46   Glucose Latest Ref Range: 70 - 99 mg/dL 120 (H)   WBC Latest Ref Range: 4.0 - 11.0 10e9/L 4.3   Hemoglobin Latest Ref Range: 13.3 - 17.7 g/dL 10.6 (L)   Hematocrit Latest Ref Range: 40.0 - 53.0 % 31.6 (L)   Platelet Count Latest Ref Range: 150 - 450 10e9/L 11 (LL)   RBC Count Latest Ref Range: " 4.4 - 5.9 10e12/L 3.62 (L)   MCV Latest Ref Range: 78 - 100 fl 87   MCH Latest Ref Range: 26.5 - 33.0 pg 29.3   MCHC Latest Ref Range: 31.5 - 36.5 g/dL 33.5   RDW Latest Ref Range: 10.0 - 15.0 % 12.0   Diff Method Unknown Manual Differential   % Neutrophils Latest Units: % 38.2   % Lymphocytes Latest Units: % 29.6   % Monocytes Latest Units: % 30.4   % Eosinophils Latest Units: % 0.9   % Basophils Latest Units: % 0.0   % Metamyelocytes Latest Units: % 0.9   Absolute Neutrophil Latest Ref Range: 1.6 - 8.3 10e9/L 1.6   Absolute Lymphocytes Latest Ref Range: 0.8 - 5.3 10e9/L 1.3   Absolute Monocytes Latest Ref Range: 0.0 - 1.3 10e9/L 1.3   Absolute Eosinophils Latest Ref Range: 0.0 - 0.7 10e9/L 0.0   Absolute Basophils Latest Ref Range: 0.0 - 0.2 10e9/L 0.0   Absolute Metamyelocytes Latest Ref Range: 0 10e9/L 0.0   RBC Morphology Unknown Normal   Platelet Estimate Unknown Confirming automated cell count     Assessment/Plan:  Artemio is a 21 yo male with 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 His post-transplant complications have included c.difficile (received two courses of PO Vanc) and anorexia (on full TPN/IL). Isael is slowly improving now that he is outpatient. He still requires platelets (to receive today) and intermittently GCSF. His diarrhea is improving and his appetite is very very slowly improving. His day+21 Next-Gene Sequencing from the bone marrow is negative which is excellent news.     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 Of note, his initial post CAR-T therapy was complicated by Grade III CRS necessitating Tociluzimab x3, dopamine pressor support, and steroids. No CRS, neurotoxicity  or infectious complications from [place] CAR-T. He received preparative regimen per [id number] with Thiotepa, Fludarabine, Busulfan, and ATG and received a UCBT on 2/16/21. He engrafted on day +20. His Day +21 peripheral VNTRs (3/9) and bone marrow chimerism (3/11) reveals 100% donor chimerism and his flow, morphology, and NGS is negative. FISH and chromosomes pending.     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 Continue Tacrolimus 0.5mg PO BID, goal 5-10. Level elevated, but would need to switch to liquid.  Will repeat the level on Monday.      FEN/Renal: Continue TPN/IL (cycled to 12 hours) - pharmacy to decrease K in the TPN today. Regular diet as tolerated. Dietician to follow. Weekly screening for aHUS/TA-TMA until day+60: LDH qMon/Thurs: 186 (3/17); urine protein/creatinine qTuesday: 0.14 (3/17) - no concerns currently.     Cardiovascular: History of asymptomatic WPW syndrome (diagnosed 2018): no interventions to date. Most recent EKG (1/27). ECHO (1/26) normal, EF 64%. 24h Zio patch holter (1/27-1/28) revealed c/w WPW with no ventricular ectopies present. Cardiology following. BNP elevated 664 (2/16) likely attributed to fluid shifts per cardiology. BNP threshold of concern is >5000. Follow up ECHO in 6 mos (7/2021). Normotensive.     Heme: Transfuse for hemoglobin < 8 (experienced mild dizziness and malaise when hgb in 7s); platelet < 10K. Hx of transfusion reactions (hives): Premedicate with tylenol and benadryl prior to pRBCs and plts. S/p GCSF daily until ANC >/= 2500 x 3 consecutive days completed 3/17. Now prn for ANC < 1000.      Infectious Disease: History of neutropenic fever (3/8): s/p Cefepime with negative blood cultures (3/8 yeast) from CVC and port-a-cath. History of C.Difficile colitis (2/17); treated with PO Vanc (2/17-2/26) and (3/5-3/15). Diarrhea improving. Continue Viral prophylaxis (CMV/HSV sero positive): HD Acyclovir. Weekly CMV neg 3/18. Continue Fungal prophylaxis with  Itraconazole (started 3/11). Needs Itraconazole level drawn 3/22 (already ordered). Received Pentamidine 2/15; Bactrim beginning Monday 3/22 (no hx of adverse reaction).    Immune: No history of hypogammaglobinemia: IgG level 839 (3/15), continue checking q2 weeks and replace if <400 (no hx replacement at [facility]). Does not require IVIG.     GI: Continue Protonix BID. No longer requiring TUMs prn. Continue ativan BID (can wean next week if PO improving). S/p Ursodiol for VOD prophylaxis (discontinued at discharge).Had been on the VOD study, but decided he wanted to discontinue because did not want to be NPO prior to scans.      Neuro: No longer utilizing oxycodone. L shoulder pain completely resolved. Of note, history of significant spinal headaches; consider using Candelario needle with lumbar punctures.      Fertility: Sperm collected previously for fertility preservation.    Disposition: RTC Monday 3/22 for exam, labs, and possible platelet transfusion with Lisa JOHN. RTC to see Dr. Zavala on Friday 3/26 for exam, labs and possible platelet transfusion. We discussed with Isael that he can see one or two friends as long as everyone wears masks the entire time.     Patient seen and examined with Pediatric BMT Attending Dr. Zavala.    Edd Jones MD MPH  Pediatric BMT Fellow  [facility]    Viky Zavala MD, PhD    Pediatric Blood and Marrow Transplant  [facility]    Total time spent on the following services on the date of the encounter: 50 minutes  Preparing to see patient, chart review, review of outside records, ordering medications, test, procedures, interpretation of labs, imaging and other tests, counseling and educating the patient/family/caregiver, documenting clinical information in the electronic or other health record, and communicating results to the patient/family/caregiver.     Viky BASS MD PhD, saw this  patient with the fellow and agree with the fellow s findings and plan of care as documented in the fellow s note.

## 2021-03-21 NOTE — PHARMACY-CONSULT NOTE
Outpatient IV Medication Monitoring Note     Artemio's TPN formula, labs, and nutritional status were reviewed today.       Based on the results the following changes are recommended to the TPN:  No Changes     The following reflects the new TPN formula.  Dosing Weight:  64.5 kg  Volume:  1800 mL, cycled over 12 hours  Protein:  100 gm  Dextrose:  236 gm  Lipids:  240 mL/day     Sodium:  80 mEq/day  Potassium: 15 mEq/day   Calcium:  10 mEq/day  Magnesium:  45 mEq/day  Phosphorus:  zero  Chloride:Acetate ratio:  1:2  Trace elements with Selenium:  standard  Multivitamins:  standard  Vitamin K:  5 mg       This was discussed with HANNAH Mendosa and communicated to [facility].    Artemio will be in clinic Wednesday, 3/24/2021 for labs and reassessment.     Pharmacy will continue to follow.   Francisca Culp, PharmD

## 2021-03-22 ENCOUNTER — INFUSION THERAPY VISIT (OUTPATIENT)
Dept: INFUSION THERAPY | Facility: CLINIC | Age: 21
End: 2021-03-22
Attending: PEDIATRICS
Payer: COMMERCIAL

## 2021-03-22 ENCOUNTER — HOME INFUSION (PRE-WILLOW HOME INFUSION) (OUTPATIENT)
Dept: PHARMACY | Facility: CLINIC | Age: 21
End: 2021-03-22

## 2021-03-22 ENCOUNTER — ONCOLOGY VISIT (OUTPATIENT)
Dept: TRANSPLANT | Facility: CLINIC | Age: 21
End: 2021-03-22
Attending: NURSE PRACTITIONER
Payer: COMMERCIAL

## 2021-03-22 VITALS
DIASTOLIC BLOOD PRESSURE: 68 MMHG | OXYGEN SATURATION: 98 % | RESPIRATION RATE: 16 BRPM | SYSTOLIC BLOOD PRESSURE: 102 MMHG | TEMPERATURE: 98.6 F | HEART RATE: 91 BPM

## 2021-03-22 DIAGNOSIS — C91.00 ALL (ACUTE LYMPHOBLASTIC LEUKEMIA OF INFANT) (H): ICD-10-CM

## 2021-03-22 DIAGNOSIS — C91.02 ACUTE LYMPHOBLASTIC LEUKEMIA (ALL) IN RELAPSE (H): Primary | ICD-10-CM

## 2021-03-22 DIAGNOSIS — Z94.81 S/P ALLOGENEIC BONE MARROW TRANSPLANT (H): ICD-10-CM

## 2021-03-22 DIAGNOSIS — Z51.81 ENCOUNTER FOR THERAPEUTIC DRUG MONITORING: ICD-10-CM

## 2021-03-22 LAB
ANISOCYTOSIS BLD QL SMEAR: SLIGHT
BASOPHILS # BLD AUTO: 0 10E9/L (ref 0–0.2)
BASOPHILS NFR BLD AUTO: 0 %
DIFFERENTIAL METHOD BLD: ABNORMAL
EOSINOPHIL # BLD AUTO: 0 10E9/L (ref 0–0.7)
EOSINOPHIL NFR BLD AUTO: 0 %
ERYTHROCYTE [DISTWIDTH] IN BLOOD BY AUTOMATED COUNT: 12.8 % (ref 10–15)
HCT VFR BLD AUTO: 30.9 % (ref 40–53)
HGB BLD-MCNC: 10 G/DL (ref 13.3–17.7)
INR PPP: 1.01 (ref 0.86–1.14)
LYMPHOCYTES # BLD AUTO: 0.8 10E9/L (ref 0.8–5.3)
LYMPHOCYTES NFR BLD AUTO: 24.1 %
Lab: NORMAL
MCH RBC QN AUTO: 29.9 PG (ref 26.5–33)
MCHC RBC AUTO-ENTMCNC: 32.4 G/DL (ref 31.5–36.5)
MCV RBC AUTO: 92 FL (ref 78–100)
MONOCYTES # BLD AUTO: 1.3 10E9/L (ref 0–1.3)
MONOCYTES NFR BLD AUTO: 37.9 %
NEUTROPHILS # BLD AUTO: 1.3 10E9/L (ref 1.6–8.3)
NEUTROPHILS NFR BLD AUTO: 38 %
PLATELET # BLD AUTO: 12 10E9/L (ref 150–450)
PLATELET # BLD EST: ABNORMAL 10*3/UL
POIKILOCYTOSIS BLD QL SMEAR: SLIGHT
RBC # BLD AUTO: 3.35 10E12/L (ref 4.4–5.9)
SPECIMEN SOURCE: NORMAL
TACROLIMUS BLD-MCNC: 8.7 UG/L (ref 5–15)
TME LAST DOSE: NORMAL H
TOXIC GRANULES BLD QL SMEAR: PRESENT
WBC # BLD AUTO: 3.3 10E9/L (ref 4–11)
YEAST SPEC QL CULT: NO GROWTH

## 2021-03-22 PROCEDURE — 250N000011 HC RX IP 250 OP 636

## 2021-03-22 PROCEDURE — 80189 DRUG ASSAY ITRACONAZOLE: CPT | Performed by: PEDIATRICS

## 2021-03-22 PROCEDURE — 36591 DRAW BLOOD OFF VENOUS DEVICE: CPT

## 2021-03-22 PROCEDURE — 85610 PROTHROMBIN TIME: CPT | Performed by: PEDIATRICS

## 2021-03-22 PROCEDURE — 80299 QUANTITATIVE ASSAY DRUG: CPT | Performed by: PEDIATRICS

## 2021-03-22 PROCEDURE — 80197 ASSAY OF TACROLIMUS: CPT | Performed by: NURSE PRACTITIONER

## 2021-03-22 PROCEDURE — 99215 OFFICE O/P EST HI 40 MIN: CPT | Performed by: PHYSICIAN ASSISTANT

## 2021-03-22 PROCEDURE — 99417 PROLNG OP E/M EACH 15 MIN: CPT | Performed by: PHYSICIAN ASSISTANT

## 2021-03-22 PROCEDURE — 85025 COMPLETE CBC W/AUTO DIFF WBC: CPT | Performed by: NURSE PRACTITIONER

## 2021-03-22 RX ORDER — HEPARIN SODIUM,PORCINE 10 UNIT/ML
VIAL (ML) INTRAVENOUS
Status: COMPLETED
Start: 2021-03-22 | End: 2021-03-22

## 2021-03-22 RX ORDER — HEPARIN SODIUM,PORCINE 10 UNIT/ML
2-4 VIAL (ML) INTRAVENOUS EVERY 24 HOURS
Status: DISCONTINUED | OUTPATIENT
Start: 2021-03-22 | End: 2021-03-22 | Stop reason: HOSPADM

## 2021-03-22 RX ADMIN — Medication 4 ML: at 11:44

## 2021-03-22 RX ADMIN — HEPARIN, PORCINE (PF) 10 UNIT/ML INTRAVENOUS SYRINGE 4 ML: at 11:44

## 2021-03-22 ASSESSMENT — PAIN SCALES - GENERAL: PAINLEVEL: NO PAIN (0)

## 2021-03-22 NOTE — PROGRESS NOTES
This is a recent snapshot of the patient's [facility] medical record.  For current drug dose and complete information and questions, call [phone] or In Basket pool, fv home infusion ([id number])  CSN Number:  [id number]

## 2021-03-22 NOTE — PROGRESS NOTES
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 He was discharged from the hospital last week and returns to clinic this morning with his father for labwork, exam and possible platelet transfusion. He is engrafted with no signs of GVHD. His hemoglobin is stable, he has required GCSF every few days and his platelet requirement is improving.    Isael reports that he has had a great weekend and is feeling well. He has not vomited since discharge, but continues to take his ativan PRN-- approximately once daily. TPN/IL continues at 12h overnight. Eating bites of food and gaining more interest in eating, however actual intake remains insubstantial. Drinking well. Stools 1-2x daily, consistency now soft with occasional loose stools. Medications administered without difficulty. Taking tacrolimus 0.5 mg BID, aware that suspension may need to be implemented pending today's level. Denies rash, headache, fever, cough, congestion, pain, or other concerns. Occasional mild oozing of gums with mouth cares, but denies overt active bleeding or epistaxis.    Review of Systems: Pertinent positives include those mentioned in interval events. A complete review of systems was performed and is otherwise negative.      Medications:  Itraconazole 200 mg PO BID  Protonix 40 mg PO BID  Tacrolimus 0.5 mg PO BID  Valtrex 1g PO TID  PRNs:  Ativan 1 mg PO BID PRN  Oxycodone 5 mg q6h PRN    Physical Exam:  Vital Signs for Peds 3/22/2021   SYSTOLIC 102   DIASTOLIC 68   PULSE 91   TEMPERATURE 98.6   RESPIRATIONS 16   WEIGHT (kg)    HEIGHT (cm)    BMI    pain    O2 98   GEN: Reclined in exam bed. Smiling, pleasant, well-appearing, cooperative. Father present.  HEENT: alopecia, NC/AT, PER, MMM, nares patent.   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     Isael continues to recover and feel better each day. Pain and nausea minimal, neutrophil engrafted, platelet needs improving. Remains on TPN, starting to show interest in food.    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 S/p preparative regimen per MT2013-09 with Thiotepa (days -7 trough -6), Fludarabine (days -5 through -3), Busulfan (days -5 through -3), ATG (days -4 through -2) followed by UCBT (2/16).  Neutrophil recovery acheived day +20. Day +21 peripheral VNTRs (3/9) reveal 100% donor chimerism  - Bone marrow biopsy (3/11): flow & morphology & NGS negative, FISH, chromosomes pending     # Risk for GVHD:   - Continue Tacrolimus, goal 5-10.  Level 10.4 last week, however further dose changes require suspension. Repeat level pending today, consider initiating suspension dosing-- Rx sent to compounding pharmacy today to have available.  - MMF discontinued prior to discharge     FEN/Renal:  # Risk for malnutrition: Trialing bites of food  - Continue TPN/IL (cycled to 12 hours)   - Regular diet as tolerated  - Dietician to follow     # Electrolyte abnormalities:    - Check daily electrolytes-- of note, CMP not ordered for today, patient left before a late order could be entered. Given stability of recent electrolytes and BUN/Cr, will defer CMP to Wednesday 3/24.  - Replace/adjust in TPN as indicated     # Risk for renal dysfunction and fluid overload: GFR (1/28): 119 mls/min  - Monitor daily weights     # Risk  for aHUS/TA-TMA:    - Monitor LDH qMon/Thurs: 186 (3/17)  - Monitor urine protein/creatinine qTuesday: 0.14 (3/17)     Pulmonary:  # Risk for pulmonary insufficiency: No current concerns     Cardiovascular:  # Asymptomatic WPW syndrome (diagnosed 2018): no interventions to date. Most recent EKG (1/27). ECHO (1/26) normal, EF 64%. 24h Zio patch holter (1/27-1/28) revealed c/w WPW with no ventricular ectopies present. Cardiology following. BNP elevated 664 (2/16) likely attributed to fluid shifts per cardiology. BNP threshold of concern is >5000. Weekly BNPs/Troponins monitored inpatient, now discontinued as an outpatient.  - Follow up ECHO in 6 mos (7/2021)     # Risk for hypertension secondary to medications: normotensive      Heme:   # Pancytopenia secondary to chemotherapy  - Transfuse for hemoglobin < 8 (experienced mild dizziness and malaise when hgb in 7s); platelet < 10K. Defer platelet transfusion today for 12K, repeat CBC weds 3/24  - Hx of transfusion reactions (hives): Premedicate with tylenol and benadryl prior to pRBCs and plts  - GCSF daily until ANC >/= 2500 x 3 consecutive days completed 3/17. Now prn for ANC < 1000.     Infectious Disease:  # Neutropenic fever (3/8): s/p Cefepime  - Follow blood cultures (3/8 yeast) from CVC and port-a-cath: NGTD     # C.Difficile colitis: Continues with diarrhea, though now improving, 3 stools daily with improving consistency.  - S/P 10 day course oral vanco (2/17-2/26)& (3/5-3/15)      # Risk for infection given immunocompromised status with need for prophylaxis:  - Viral (CMV/HSV sero positive): HD Acyclovir. Weekly CMV neg 3/18  - Fungal: continue Itraconazole (started 3/11)  - PCP: Pentamidine given 2/15. Bactrim beginning today Monday 3/22 (no hx of adverse reaction).  - Hypogammaglobulinemia: IgG level 839 (3/15), continue checking q2 weeks and replace if <400 (no hx replacement at [facility]).      Past infections: C. Diff (November 2020)     GI:   # Reflux:  -  BID protonix   - PRN Tums      # Nausea: Minimal, no emesis  - Ativan PRN     # Risk for VOD: most recent abd US (2/22) revealed stable, mild hepatomegaly (previously enrolled in elastography study)  - Ursodiol TID discontinued at discharge     Neuro:  # Mucositis/abdominal pain: Denies pain, infrequent use of oxycodone.  - Tylenol/Oxycodone PRN     # Left lateral shoulder/deltoid pain (3/6): c/w MSK pain, decreased ROM, pain on palpation. Much improved.   - continue PT      # History of significant spinal headaches: consider using Candelario needle with lumbar punctures     # CRS history. 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 No CRS, neurotoxicity or infectious complications from [place] CAR-T.      Fertility: Sperm collected previously collected for fertility preservation    Disposition: RTC 3/24 for labs and exam with an POLLO, possible platelet transfusion, Tacrolimus level.    SEBAS Christian (Flesher), PA-C  Pediatric Blood and Marrow Transplant Program  [facility]  Pager: [phone]  Fax: [phone]    I spent a total of 70 minutes with Artemio Nino on the date of encounter doing chart review, history and exam, review of labs/imaging, discussion with the family, documentation and further activities as noted above.     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

## 2021-03-22 NOTE — PROGRESS NOTES
Infusion Nursing Note    Artemio Nino Presents to [facility] today for: Possible Plts    Due to :    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)  S/P allogeneic bone marrow transplant (H)  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)    Intravenous Access/Labs: Labs drawn from red lumen of CVC.    Coping:   Child Family Life declined    Infusion Note: Pt arrived to clinic with dad. Patient denies any recent fever/infections; no new issues/concerns. Labs drawn as ordered. Hgb 10 and Plt 12; pt does not meet parameters for transfusion today per provider. Red lumen of CVC heparin locked. Pt requesting CVC dressing change. Galvan dressing and cap change completed without difficulty using sterile technique. Pt seen by HANNAH Mendosa while in clinic.     Discharge Plan:   Father verbalized understanding of discharge instructions.  RN reviewed that pt should return to clinic on 3/24/2021.  Pt left [facility] in stable condition.

## 2021-03-22 NOTE — PATIENT INSTRUCTIONS
Return to [facility] for labs and exam with an POLLO on Weds 3/24. Please hold tacrolimus prior to visit for blood drug level, and take this medication after level obtained.    Infusion needs: possible platelets    Patient has PICC, Central line, CVC line, to be drawn off of per lab.     Medication changes: begin bactrim    Care plan changes:     Contact information  During business hours (7:30am-4:30pm):   To leave a non-urgent voicemail: call triage line [phone]    To call for time-sensitive needs or concerns : call clinic  [phone]    Evenings after 4:30pm, weekends, and holidays:   For any needs or concerns: call for BMT fellow at [phone] 911 in the case of an emergency    Thank you!     Patient seen on 03/24 as of 04/07 @ 11:10am. SADI

## 2021-03-23 ENCOUNTER — TELEPHONE (OUTPATIENT)
Dept: TRANSPLANT | Facility: CLINIC | Age: 21
End: 2021-03-23

## 2021-03-23 DIAGNOSIS — Z94.81 S/P ALLOGENEIC BONE MARROW TRANSPLANT (H): Primary | ICD-10-CM

## 2021-03-23 LAB
ITRACONAZ SERPL-MCNC: <0.1 UG/ML (ref 0.5–5)
ITRACONAZ+HIT SERPL-MCNC: <0.1 UG/ML
OH-ITRACONAZ SERPL-MCNC: <0.1 UG/ML

## 2021-03-23 RX ORDER — ACETAMINOPHEN 325 MG/1
650 TABLET ORAL ONCE
Status: CANCELLED
Start: 2021-03-23 | End: 2021-03-23

## 2021-03-23 RX ORDER — ITRACONAZOLE 10 MG/ML
300 SOLUTION ORAL 2 TIMES DAILY
Qty: 1800 ML | Refills: 3 | Status: SHIPPED | OUTPATIENT
Start: 2021-03-23 | End: 2021-03-26

## 2021-03-23 RX ORDER — DIPHENHYDRAMINE HYDROCHLORIDE 50 MG/ML
50 INJECTION INTRAMUSCULAR; INTRAVENOUS EVERY 6 HOURS PRN
Status: CANCELLED
Start: 2021-03-23

## 2021-03-23 NOTE — PHARMACY-CONSULT NOTE
Itraconazole Monitoring Note   D: Current Itraconazole dose: 200 mg PO BID (capsules)  Itraconazole Level: <0.1   A: Goal Itraconazole trough level: >0.5 mg/L   Treatment failure has been reported with levels <0.5; some literature reports toxicity seen with levels > 17.  Itraconazole also has an active hydroxy metabolite that may have antifungal activity against some strains of yeast and mold.  The goal for the sum of the 2 levels is >1.5 and is recommended to not exceed 10 due to the presence of side effects (specifically GI upset and tremor).  Current trough level is below the desired minimum level.   Significant drug interactions include: tacrolimus  P: Recommend increasing itraconazole to 300 mg PO BID and changing to suspension; discussed with patient to take on an empty stomach.  Discussed recommendations with Gabriella.  Recheck on 3/26 to observe if he is absorbing (assess therapeutic after 10 days).  Pharmacy team will continue to follow.    Francisca Culp, AndreD

## 2021-03-23 NOTE — PROGRESS NOTES
DRUG LEVEL MONITORING NOTE      Itraconazole Monitoring Note     D:  Current Intraconazole dose:  200 mg BID (tablets)     Itraconazole level: <0.1   Goals for therapy = >0.5     A:  Current trough level is below the desired range.  Drug interactions include tacrolimus      P:  Increase dose to 300 mg BID and transition to oral suspension for better absorption. Suspension will be ready in clinic tomorrow for pickup. Repeat level on Monday 3/29. Isael verbalized understanding with this plan.     Gabriella Paul CPNP-PC  [facility]  Pediatric Blood and Marrow Transplant

## 2021-03-23 NOTE — PROGRESS NOTES
CLINICAL NUTRITION SERVICES - TELEPHONE ENCOUNTER    Called patient to discuss po intake and tolerance of TPN since discharge. Patient reported that TPN has been going well and his po intake is getting better day by day. He reports that he started out after discharge eating a couple bites of food such as pudding here and there. Now he is eating cereal and meals however still not eating the entire meal.     RD discussed that she could stop by clinic to see patient to talk about po intake and see if any adjustments to tpn need to be made either at the end of this week or next week. Patient asked that RD set up appointment for end of this week on Friday after appointment with doctor. RD scheduled appointment for Friday, March 26th at 10 am.     RD will continue to follow and is available for any further questions or concerns.     Tita Guillen RD, LD  Pediatric Registered Dietitian  [facility]  [phone] (phone)  [phone] (pager)  [phone] (fax)  [email or web address]

## 2021-03-23 NOTE — PROGRESS NOTES
This is a recent snapshot of the patient's [facility] medical record.  For current drug dose and complete information and questions, call [phone] or In Basket pool, fv home infusion ([id number])  CSN Number:  [id number]

## 2021-03-24 ENCOUNTER — HOME INFUSION (PRE-WILLOW HOME INFUSION) (OUTPATIENT)
Dept: PHARMACY | Facility: CLINIC | Age: 21
End: 2021-03-24

## 2021-03-24 ENCOUNTER — INFUSION THERAPY VISIT (OUTPATIENT)
Dept: INFUSION THERAPY | Facility: CLINIC | Age: 21
End: 2021-03-24
Attending: NURSE PRACTITIONER
Payer: COMMERCIAL

## 2021-03-24 ENCOUNTER — ONCOLOGY VISIT (OUTPATIENT)
Dept: TRANSPLANT | Facility: CLINIC | Age: 21
End: 2021-03-24
Attending: NURSE PRACTITIONER
Payer: COMMERCIAL

## 2021-03-24 VITALS
HEIGHT: 69 IN | SYSTOLIC BLOOD PRESSURE: 100 MMHG | HEART RATE: 103 BPM | TEMPERATURE: 98.3 F | RESPIRATION RATE: 18 BRPM | OXYGEN SATURATION: 98 % | WEIGHT: 145.72 LBS | DIASTOLIC BLOOD PRESSURE: 69 MMHG | BODY MASS INDEX: 21.58 KG/M2

## 2021-03-24 DIAGNOSIS — C91.01 ACUTE LYMPHOBLASTIC LEUKEMIA (ALL) IN REMISSION (H): Primary | ICD-10-CM

## 2021-03-24 DIAGNOSIS — Z51.81 ENCOUNTER FOR THERAPEUTIC DRUG MONITORING: Primary | ICD-10-CM

## 2021-03-24 DIAGNOSIS — Z94.81 STATUS POST BONE MARROW TRANSPLANT (H): ICD-10-CM

## 2021-03-24 DIAGNOSIS — C91.02 ACUTE LYMPHOBLASTIC LEUKEMIA (ALL) IN RELAPSE (H): ICD-10-CM

## 2021-03-24 LAB
ALBUMIN SERPL-MCNC: 3.5 G/DL (ref 3.4–5)
ALP SERPL-CCNC: 63 U/L (ref 40–150)
ALT SERPL W P-5'-P-CCNC: 67 U/L (ref 0–70)
ANION GAP SERPL CALCULATED.3IONS-SCNC: 6 MMOL/L (ref 3–14)
ANISOCYTOSIS BLD QL SMEAR: SLIGHT
AST SERPL W P-5'-P-CCNC: 40 U/L (ref 0–45)
BASOPHILS # BLD AUTO: 0 10E9/L (ref 0–0.2)
BASOPHILS NFR BLD AUTO: 0 %
BILIRUB SERPL-MCNC: 0.7 MG/DL (ref 0.2–1.3)
BUN SERPL-MCNC: 21 MG/DL (ref 7–30)
CALCIUM SERPL-MCNC: 8.9 MG/DL (ref 8.5–10.1)
CHLORIDE SERPL-SCNC: 110 MMOL/L (ref 94–109)
CO2 SERPL-SCNC: 22 MMOL/L (ref 20–32)
COPATH REPORT: NORMAL
CREAT SERPL-MCNC: 0.75 MG/DL (ref 0.66–1.25)
CREAT UR-MCNC: 120 MG/DL
DACRYOCYTES BLD QL SMEAR: SLIGHT
DIFFERENTIAL METHOD BLD: ABNORMAL
EOSINOPHIL # BLD AUTO: 0 10E9/L (ref 0–0.7)
EOSINOPHIL NFR BLD AUTO: 0 %
ERYTHROCYTE [DISTWIDTH] IN BLOOD BY AUTOMATED COUNT: 15.6 % (ref 10–15)
GFR SERPL CREATININE-BSD FRML MDRD: >90 ML/MIN/{1.73_M2}
GLUCOSE SERPL-MCNC: 125 MG/DL (ref 70–99)
HCT VFR BLD AUTO: 31.3 % (ref 40–53)
HGB BLD-MCNC: 10.5 G/DL (ref 13.3–17.7)
LDH SERPL L TO P-CCNC: 297 U/L (ref 85–227)
LYMPHOCYTES # BLD AUTO: 0.7 10E9/L (ref 0.8–5.3)
LYMPHOCYTES NFR BLD AUTO: 15.9 %
MACROCYTES BLD QL SMEAR: PRESENT
MAGNESIUM SERPL-MCNC: 2.2 MG/DL (ref 1.6–2.3)
MCH RBC QN AUTO: 30.4 PG (ref 26.5–33)
MCHC RBC AUTO-ENTMCNC: 33.5 G/DL (ref 31.5–36.5)
MCV RBC AUTO: 91 FL (ref 78–100)
MONOCYTES # BLD AUTO: 1.2 10E9/L (ref 0–1.3)
MONOCYTES NFR BLD AUTO: 27.4 %
MYELOCYTES # BLD: 0 10E9/L
MYELOCYTES NFR BLD MANUAL: 0.9 %
NEUTROPHILS # BLD AUTO: 2.5 10E9/L (ref 1.6–8.3)
NEUTROPHILS NFR BLD AUTO: 55.8 %
PHOSPHATE SERPL-MCNC: 3.4 MG/DL (ref 2.5–4.5)
PLATELET # BLD AUTO: 18 10E9/L (ref 150–450)
PLATELET # BLD EST: ABNORMAL 10*3/UL
POIKILOCYTOSIS BLD QL SMEAR: SLIGHT
POLYCHROMASIA BLD QL SMEAR: SLIGHT
POTASSIUM SERPL-SCNC: 4.3 MMOL/L (ref 3.4–5.3)
PROT SERPL-MCNC: 6.7 G/DL (ref 6.8–8.8)
PROT UR-MCNC: 0.1 G/L
PROT/CREAT 24H UR: 0.09 G/G CR (ref 0–0.2)
RBC # BLD AUTO: 3.45 10E12/L (ref 4.4–5.9)
SODIUM SERPL-SCNC: 138 MMOL/L (ref 133–144)
TACROLIMUS BLD-MCNC: 8.3 UG/L (ref 5–15)
TME LAST DOSE: NORMAL H
TOXIC GRANULES BLD QL SMEAR: PRESENT
WBC # BLD AUTO: 4.5 10E9/L (ref 4–11)

## 2021-03-24 PROCEDURE — 36592 COLLECT BLOOD FROM PICC: CPT | Performed by: PHYSICIAN ASSISTANT

## 2021-03-24 PROCEDURE — 80197 ASSAY OF TACROLIMUS: CPT | Performed by: PHYSICIAN ASSISTANT

## 2021-03-24 PROCEDURE — 99215 OFFICE O/P EST HI 40 MIN: CPT | Performed by: NURSE PRACTITIONER

## 2021-03-24 PROCEDURE — 85025 COMPLETE CBC W/AUTO DIFF WBC: CPT | Performed by: PHYSICIAN ASSISTANT

## 2021-03-24 PROCEDURE — 84100 ASSAY OF PHOSPHORUS: CPT | Performed by: PHYSICIAN ASSISTANT

## 2021-03-24 PROCEDURE — 84156 ASSAY OF PROTEIN URINE: CPT | Performed by: PHYSICIAN ASSISTANT

## 2021-03-24 PROCEDURE — G0463 HOSPITAL OUTPT CLINIC VISIT: HCPCS

## 2021-03-24 PROCEDURE — 83615 LACTATE (LD) (LDH) ENZYME: CPT | Performed by: PHYSICIAN ASSISTANT

## 2021-03-24 PROCEDURE — 80053 COMPREHEN METABOLIC PANEL: CPT | Performed by: PHYSICIAN ASSISTANT

## 2021-03-24 PROCEDURE — 83735 ASSAY OF MAGNESIUM: CPT | Performed by: PHYSICIAN ASSISTANT

## 2021-03-24 ASSESSMENT — PAIN SCALES - GENERAL: PAINLEVEL: NO PAIN (0)

## 2021-03-24 ASSESSMENT — MIFFLIN-ST. JEOR: SCORE: 1652.25

## 2021-03-24 NOTE — PHARMACY-CONSULT NOTE
Outpatient IV Medication Monitoring Note     Artemio's TPN formula, labs, and nutritional status were reviewed today.       Based on the results the following changes are recommended to the TPN:  see below     The following reflects the new TPN formula.  Dosing Weight: 64.5 kg  Volume: 1800 mL, cycled over 12 hours  Protein: 100 gm  Dextrose: 236 gm  Lipids: 240 mL/day     Sodium:  80 mEq/day  Potassium: 15 mEq/day   Calcium:  10 mEq/day  Magnesium:  20 mEq/day (was 45 mEq/day)  Phosphorus: zero  Chloride:Acetate ratio: 1:2  Trace elements with Selenium: standard  Multivitamins: standard  Vitamin K:  ZERO (was 5 mg)      This was discussed with Lisa Workman NP and communicated to [facility].    Artemio will be in clinic Friday 3/26 for labs and reassessment.      Pharmacy will continue to follow.   Francisca Culp, PharmD

## 2021-03-24 NOTE — PROGRESS NOTES
Infusion Nursing Note    Artemio Nino presents to the [facility] today for: possible platelets     Due to :    Encounter for therapeutic drug monitoring  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)    Infusion Note: Patient's platelet level was 18 today, therefore patient did not meet parameters for a platelet transfusion. Patient was seen and assessed by Lisa Workman MD on the clinic side.

## 2021-03-24 NOTE — PROGRESS NOTES
Pediatric BMT Daily Progress Note  DOS 3/24/21    HPI: Artemio is a 19 yo male with a history of multiply relapsed ALL now day +36 s/p UCBT following MAC per 2013-09. He was discharged from the hospital last week and returns to clinic this morning with his father for labwork, exam and possible platelet transfusion. He is engrafted with no signs of GVHD.     Isael reports that he has had a great weekend and is feeling well. He has not vomited since discharge, but continues to take his ativan PRN-- approximately once daily. TPN/IL continues at 12h overnight. HE is now eating 3 meals per day, cereal for breakfast, more like a snack at lunch time, and then whatever is served for dinner.  Drinking well. Stools 1-2x daily, consistency now soft with occasional loose stools. Medications administered without difficulty. Taking tacrolimus, last level therapeutic. Itraconazole level not detectable, changing from capsules to suspension, which is better absorbed. Denies rash, headache, fever, cough, congestion, pain, or other concerns. Occasional mild oozing of gums with mouth cares, but denies overt active bleeding or epistaxis.    Review of Systems: Pertinent positives include those mentioned in interval events. A complete review of systems was performed and is otherwise negative.      Medications:  Itraconazole 300 mg PO BID (suspension)  Protonix 40 mg PO BID  Tacrolimus 0.5 mg PO BID  Valtrex 1g PO TID  PRNs:  Ativan 1 mg PO BID PRN    Physical Exam:  EN: Sitting on exam table, smiling, pleasant, well-appearing, cooperative. Father present.  HEENT: alopecia, NC/AT, PER, MMM, nares patent.   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     Isael continues to recover and feel better each day. No complaints of pain or nausea. No diarrhea. PO intake improving.    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 S/p preparative regimen per LA9088-35 with Thiotepa (days -7 trough -6), Fludarabine (days -5 through -3), Busulfan (days -5 through -3), ATG (days -4 through -2) followed by UCBT (2/16).  Neutrophil recovery acheived day +20. Day +21 peripheral VNTRs (3/9) reveal 100% donor chimerism  - Bone marrow biopsy (3/11): flow & morphology, FISH & NGS negative, chromosomes pending     # Risk for GVHD:   - Continue Tacrolimus, goal 5-10.  Level 8.7 on 3/22. Note: further dose changes require suspension. Rx sent to compounding pharmacy 3/22 to have available.  - MMF discontinued prior to discharge     FEN/Renal:  # Risk for malnutrition: Eating 2 full meals and snack at midday. Has dietician consult on 3/25. Anticipate discontinuing TPN in the next few days.  - Continue TPN/IL (cycled to 12 hours)   - Regular diet as tolerated  - Dietician to follow     # Electrolyte abnormalities:  Chloride slightly elevated at 110.  - Decreased magnesium by 50% today in anticipation of discontinuing TPN soon.    # Risk for renal dysfunction and fluid overload: GFR (1/28): 119 mls/min     # Risk for aHUS/TA-TMA:    - Monitor LDH qMon/Thurs: 297  (3/24)  - Monitor urine protein/creatinine qTuesday: 0.09 (3/24)     Pulmonary:  # Risk for pulmonary insufficiency: No current concerns     Cardiovascular:  # Asymptomatic WPW syndrome (diagnosed 2018): no interventions to date. Most recent EKG (1/27). ECHO (1/26) normal, EF 64%. 24h Zio patch holter (1/27-1/28) revealed c/w WPW with no  ventricular ectopies present. Cardiology following. BNP elevated 664 (2/16) likely attributed to fluid shifts per cardiology. BNP threshold of concern is >5000. Weekly BNPs/Troponins monitored inpatient, now discontinued as an outpatient.  - Follow up ECHO in 6 mos (7/2021)     # Risk for hypertension secondary to medications: normotensive      Heme:   # Pancytopenia secondary to chemotherapy  - Transfuse for hemoglobin < 8 (experienced mild dizziness and malaise when hgb in 7s); platelet < 10K.   - No transfusions needed today, counts stable to improving.  - Hx of transfusion reactions (hives): Premedicate with tylenol and benadryl prior to pRBCs and plts  - GCSF daily until ANC >/= 2500 x 3 consecutive days completed 3/17. Now prn for ANC < 1000.     Infectious Disease:  # C.Difficile colitis: 1-2 stools per day, soft to loose.  - S/P 10 day course oral vanco (2/17-2/26)& (3/5-3/15)      # Risk for infection given immunocompromised status with need for prophylaxis:  - Viral (CMV/HSV sero positive): HD Acyclovir. Weekly CMV neg 3/18  - Fungal: continue Itraconazole (started 3/11)  - PCP: Pentamidine given 2/15. Bactrim BID M/T, started 3/22 (no hx of adverse reaction).  - Hypogammaglobulinemia: IgG level 839 (3/15), continue checking q2 weeks and replace if <400 (no hx replacement at [facility]).      Past infections: C. Diff (November 2020)     GI:   # Reflux:  - BID protonix   - PRN Tums      # Nausea: Minimal, no emesis  - Ativan PRN     # Risk for VOD: most recent abd US (2/22) revealed stable, mild hepatomegaly (previously enrolled in elastography study)  - Ursodiol TID discontinued at discharge     Neuro:  # Mucositis/abdominal pain: Denies pain, infrequent use of oxycodone.  - Tylenol/Oxycodone PRN     # Left lateral shoulder/deltoid pain (3/6): c/w MSK pain, decreased ROM, pain on palpation. Much improved.   - continue PT      # History of significant spinal headaches: consider using Candelario needle with lumbar  punctures     # CRS history. 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 No CRS, neurotoxicity or infectious complications from [place] CAR-T.      Fertility: Sperm collected previously collected for fertility preservation    Disposition: RTC 3/26 for labs and exam with Dr. Zavala.    BENJA Chen  [facility]  Pediatric Blood and Marrow Transplant      I spent a total of 60 minutes with Artemio Nino on the date of encounter doing chart review, history and exam, review of labs/imaging, discussion with the family, documentation and further activities as noted above.     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

## 2021-03-24 NOTE — NURSING NOTE
"Chief Complaint   Patient presents with     RECHECK     Patient her today for follow up     /69 (BP Location: Right arm, Patient Position: Sitting, Cuff Size: Adult Regular)   Pulse 103   Temp 98.3  F (36.8  C) (Oral)   Resp 18   Ht 1.754 m (5' 9.06\")   Wt 66.1 kg (145 lb 11.6 oz)   SpO2 98%   BMI 21.49 kg/m      No Pain (0)  Data Unavailable    I have reviewed the patients medication and allergy list.    Patient needs refills: no    Dressing change needed? No    EKG needed? No    Zhane Frost CMA  March 24, 2021  "

## 2021-03-25 NOTE — PROGRESS NOTES
This is a recent snapshot of the patient's [facility] medical record.  For current drug dose and complete information and questions, call [phone] or In Basket pool, fv home infusion ([id number])  CSN Number:  [id number]

## 2021-03-26 ENCOUNTER — HOME INFUSION (PRE-WILLOW HOME INFUSION) (OUTPATIENT)
Dept: PHARMACY | Facility: CLINIC | Age: 21
End: 2021-03-26

## 2021-03-26 ENCOUNTER — HOSPITAL ENCOUNTER (OUTPATIENT)
Facility: CLINIC | Age: 21
End: 2021-03-26
Attending: PEDIATRICS
Payer: COMMERCIAL

## 2021-03-26 ENCOUNTER — ONCOLOGY VISIT (OUTPATIENT)
Dept: TRANSPLANT | Facility: CLINIC | Age: 21
End: 2021-03-26
Attending: PEDIATRICS
Payer: COMMERCIAL

## 2021-03-26 VITALS
DIASTOLIC BLOOD PRESSURE: 72 MMHG | OXYGEN SATURATION: 97 % | BODY MASS INDEX: 21.71 KG/M2 | TEMPERATURE: 98.5 F | HEIGHT: 69 IN | HEART RATE: 94 BPM | RESPIRATION RATE: 18 BRPM | WEIGHT: 146.61 LBS | SYSTOLIC BLOOD PRESSURE: 110 MMHG

## 2021-03-26 DIAGNOSIS — Z94.81 STATUS POST BONE MARROW TRANSPLANT (H): ICD-10-CM

## 2021-03-26 DIAGNOSIS — C91.01 ACUTE LYMPHOBLASTIC LEUKEMIA (ALL) IN REMISSION (H): Primary | ICD-10-CM

## 2021-03-26 LAB
ALBUMIN SERPL-MCNC: 3.3 G/DL (ref 3.4–5)
ALP SERPL-CCNC: 58 U/L (ref 40–150)
ALT SERPL W P-5'-P-CCNC: 64 U/L (ref 0–70)
ANION GAP SERPL CALCULATED.3IONS-SCNC: 5 MMOL/L (ref 3–14)
ANISOCYTOSIS BLD QL SMEAR: ABNORMAL
AST SERPL W P-5'-P-CCNC: 40 U/L (ref 0–45)
BASOPHILS # BLD AUTO: 0 10E9/L (ref 0–0.2)
BASOPHILS NFR BLD AUTO: 0 %
BILIRUB SERPL-MCNC: 0.6 MG/DL (ref 0.2–1.3)
BUN SERPL-MCNC: 22 MG/DL (ref 7–30)
CALCIUM SERPL-MCNC: 8.9 MG/DL (ref 8.5–10.1)
CHLORIDE SERPL-SCNC: 108 MMOL/L (ref 94–109)
CO2 SERPL-SCNC: 25 MMOL/L (ref 20–32)
CREAT SERPL-MCNC: 0.64 MG/DL (ref 0.66–1.25)
DIFFERENTIAL METHOD BLD: ABNORMAL
EOSINOPHIL # BLD AUTO: 0 10E9/L (ref 0–0.7)
EOSINOPHIL NFR BLD AUTO: 0 %
ERYTHROCYTE [DISTWIDTH] IN BLOOD BY AUTOMATED COUNT: 17.4 % (ref 10–15)
GFR SERPL CREATININE-BSD FRML MDRD: >90 ML/MIN/{1.73_M2}
GLUCOSE SERPL-MCNC: 113 MG/DL (ref 70–99)
HCT VFR BLD AUTO: 30.7 % (ref 40–53)
HGB BLD-MCNC: 10.4 G/DL (ref 13.3–17.7)
LYMPHOCYTES # BLD AUTO: 1.2 10E9/L (ref 0.8–5.3)
LYMPHOCYTES NFR BLD AUTO: 14.2 %
MACROCYTES BLD QL SMEAR: PRESENT
MAGNESIUM SERPL-MCNC: 1.7 MG/DL (ref 1.6–2.3)
MCH RBC QN AUTO: 31.1 PG (ref 26.5–33)
MCHC RBC AUTO-ENTMCNC: 33.9 G/DL (ref 31.5–36.5)
MCV RBC AUTO: 92 FL (ref 78–100)
MICROCYTES BLD QL SMEAR: PRESENT
MONOCYTES # BLD AUTO: 1.6 10E9/L (ref 0–1.3)
MONOCYTES NFR BLD AUTO: 18.6 %
NEUTROPHILS # BLD AUTO: 5.8 10E9/L (ref 1.6–8.3)
NEUTROPHILS NFR BLD AUTO: 67.2 %
PHOSPHATE SERPL-MCNC: 3.5 MG/DL (ref 2.5–4.5)
PLATELET # BLD AUTO: 29 10E9/L (ref 150–450)
PLATELET # BLD EST: ABNORMAL 10*3/UL
POIKILOCYTOSIS BLD QL SMEAR: SLIGHT
POLYCHROMASIA BLD QL SMEAR: ABNORMAL
POTASSIUM SERPL-SCNC: 4.3 MMOL/L (ref 3.4–5.3)
PROT SERPL-MCNC: 6.4 G/DL (ref 6.8–8.8)
RBC # BLD AUTO: 3.34 10E12/L (ref 4.4–5.9)
SODIUM SERPL-SCNC: 138 MMOL/L (ref 133–144)
TACROLIMUS BLD-MCNC: 8.2 UG/L (ref 5–15)
TME LAST DOSE: NORMAL H
WBC # BLD AUTO: 8.6 10E9/L (ref 4–11)

## 2021-03-26 PROCEDURE — 83735 ASSAY OF MAGNESIUM: CPT | Performed by: NURSE PRACTITIONER

## 2021-03-26 PROCEDURE — 80053 COMPREHEN METABOLIC PANEL: CPT | Performed by: NURSE PRACTITIONER

## 2021-03-26 PROCEDURE — 99215 OFFICE O/P EST HI 40 MIN: CPT | Mod: GC | Performed by: PEDIATRICS

## 2021-03-26 PROCEDURE — 97803 MED NUTRITION INDIV SUBSEQ: CPT | Performed by: DIETITIAN, REGISTERED

## 2021-03-26 PROCEDURE — 36592 COLLECT BLOOD FROM PICC: CPT | Performed by: NURSE PRACTITIONER

## 2021-03-26 PROCEDURE — 84100 ASSAY OF PHOSPHORUS: CPT | Performed by: NURSE PRACTITIONER

## 2021-03-26 PROCEDURE — 80197 ASSAY OF TACROLIMUS: CPT | Performed by: NURSE PRACTITIONER

## 2021-03-26 PROCEDURE — G0463 HOSPITAL OUTPT CLINIC VISIT: HCPCS

## 2021-03-26 PROCEDURE — 85025 COMPLETE CBC W/AUTO DIFF WBC: CPT | Performed by: NURSE PRACTITIONER

## 2021-03-26 RX ORDER — FLUCONAZOLE 200 MG/1
200 TABLET ORAL DAILY
Qty: 60 TABLET | Refills: 3 | Status: SHIPPED | OUTPATIENT
Start: 2021-03-26 | End: 2021-04-07

## 2021-03-26 ASSESSMENT — MIFFLIN-ST. JEOR: SCORE: 1655.63

## 2021-03-26 ASSESSMENT — PAIN SCALES - GENERAL: PAINLEVEL: NO PAIN (0)

## 2021-03-26 NOTE — NURSING NOTE
"Chief Complaint   Patient presents with     RECHECK     Patient being seen for day +35 exam and labs       /72 (BP Location: Right arm, Patient Position: Sitting, Cuff Size: Adult Regular)   Pulse 94   Temp 98.5  F (36.9  C) (Oral)   Resp 18   Ht 1.753 m (5' 9.02\")   Wt 66.5 kg (146 lb 9.7 oz)   SpO2 97%   BMI 21.64 kg/m      Byron Schwab LPN  March 26, 2021  "

## 2021-03-26 NOTE — PATIENT INSTRUCTIONS
Return to [facility] for labs and exam with an BMT POLLO Monday, 3/29 at 12:15 and add DLHL removal after clinic appointment Friday, 4/2 (to be scheduled by complex BMT schedulers)    Infusion needs: none    Patient has DLHL line, to be drawn off of per lab.     Medication changes: stop TPN    Care plan changes: none    Contact information  During business hours (7:30am-4:30pm):   To leave a non-urgent voicemail: call triage line [phone]    To call for time-sensitive needs or concerns : call clinic  [phone]    Evenings after 4:30pm, weekends, and holidays:   For any needs or concerns: call for BMT fellow at [phone] in the case of an emergency    Thank you!     No further follow up instructions as of 03/29 @ 1:09pm. SADI

## 2021-03-26 NOTE — PROGRESS NOTES
CLINICAL NUTRITION SERVICES - PEDIATRIC ASSESSMENT NOTE    REASON FOR ASSESSMENT  Artemio Nino is a 20 year old male seen by the dietitian for assessment of po intake and adjustments to TPN. Pt was accompanied by father in clinic.    ANTHROPOMETRICS  Date: March 26, 2021  Height: 173.5 cm  Weight (3/26): 66.5 kg   BMI: 21.64 kg / m2  Dosing Weight: 64.5 kg     Wt Readings from Last 10 Encounters:   03/26/21 66.5 kg (146 lb 9.7 oz)   03/24/21 66.1 kg (145 lb 11.6 oz)   03/19/21 64.6 kg (142 lb 6.7 oz)   03/18/21 64.7 kg (142 lb 10.2 oz)   03/17/21 64.9 kg (143 lb 1.3 oz)   03/16/21 64.8 kg (142 lb 12.8 oz)   03/14/21 63.8 kg (140 lb 10.5 oz)   01/27/21 63.5 kg (139 lb 15.9 oz)   01/26/21 63.5 kg (139 lb 15.9 oz)   09/25/20 60.8 kg (134 lb 0.6 oz)     Comments: Since discharge from hospital on 3/15 the patient's weight has increased 2.7 kg and therefore has remained stable over the past few weeks.     NUTRITION HISTORY  Patient is on a Regular diet + TPN at home. No known food allergies or dietary restrictions.     Typical fluid intake: Pt reports that he drinks about a quarter of a gatorade bottle when he takes his meds. In addition, he drinks at least a 16 oz water bottle daily of usually water but sometimes other things. Lastly, he has milk in his bowl of cereal each morning. Therefore he feels he drinks about 16-32 oz of fluids each day.     Dietary recall:   Breakfast: bowl of cereal with milk  Lunch/Snack: mac n cheese or chicken nuggets with french fries  Snack: pudding, applesauce  Dinner: 2 slices of pizza, chips & queso, noodles with rice and chicken, etc.  Snack: Pt states that he doesn't always have a snack after dinner but he occasionally will.     The pt reports that he feels his intake has gotten better since discharge. Previously after discharge he wasn't eating full meals and he was really only eating applesauce and pudding. Pt reports that at each meal he tries to eat as much as he can until he  feels full.     Information obtained from Patient  Factors affecting nutrition intake include: medical course    CURRENT NUTRITION SUPPORT   Parenteral Nutrition:  Type of Parenteral Access: Central  PN frequency: Cycled, 12 hrs     PN of 1800mLs, Dex 236 gm, GIR 5.56 mg/kg/min, 100 gm Amino Acids, 1.55 g/kg Amino Acids, 240 mL lipids, 0.8 g/kg for 1670 kcals, (26 kcal/kg) with 29% of kcal from lipids. PN is meeting 100% of kcal needs and 100% of protein needs.    PHYSICAL FINDINGS  Observed  No nutrition-related physical findings observed  Obtained from Chart/Interdisciplinary Team  BMT #2 Day +38    LABS  Labs reviewed:   - elevated     MEDICATIONS  Medications reviewed    ASSESSED NUTRITION NEEDS:    Valentino Equation: BMR (1660) x 1.2 - 1.4 = 8703-0905 kcal   Estimated Energy Needs: 30-35 kcal/kg EN/PO; 25-30 kcal/kg PN; 25-35 kcal/kg PN+EN  Estimated Protein Needs: 1.5-2g/kg  Estimated Fluid Needs: 1 ml/kcal or per MD    Micronutrient Needs: per RDA    PEDIATRIC NUTRITION STATUS VALIDATION  Patient does not meet criteria for malnutrition.    NUTRITION DIAGNOSIS:  Predicted suboptimal nutrient intake related to improving po intake as evidenced by previous reliance on TPN to meet assessed needs with plan to discontinue TPN today and potential for inability to meet needs via po intake.     INTERVENTIONS  Nutrition Prescription  PO to meet 100% assessed nutrition needs with age-appropriate weight gain and growth     Nutrition Education:   Provided nutrition education on continuing to work on po intake as he is able to tolerate. In addition, discussed with patient plan to likely stop TPN depending on conversation with doctor. Discussed that patient will likely feel more thirsty with TPN stopping however he needs to take in more fluids than he is now. Patient verbalized understanding and had no further questions or concerns.     Implementation:  1. Met with pt and father to review history, intake, and growth.    2. Nutrition education per above.     Goals  1. Pt to meet greater than 75% of assessed needs via po intake.   2. Weight maintenance     FOLLOW UP/MONITORING  1. Food and beverage intake - PO  2. Anthropometric measurements - wt/growth    RECOMMENDATIONS  1. Per conversation with team, plan to discontinue TPN today as patient consuming enough po intake.    2. Continue to encourage po intake as pt able to tolerate. Encourage fluids as well to meet needs.    3. Recommend starting multivitamin with minerals to meet micronutrient needs with po intake.    4. Monitor weight trends.     Spent 10 minutes in consult with pt and father.     Tita Guillen RD, LD  Pediatric Registered Dietitian  [facility]  [phone] (phone)  [phone] (pager)  [phone] (fax)  [email or web address]

## 2021-03-26 NOTE — PROGRESS NOTES
"2021    Dr. Manjinder Paniagua  [facility] [place] [address]    Name: Artemio Nino  MRN: [id number]  : 2000    Dear Dr. Paniagua,    We had the pleasure of seeing your patient Artemio Nino in the [facility] for routine post-BMT follow-up. 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 He has been seen this week in clinic and has required intermittent platelet transfusions. He is here today for exam and labs. He is accompanied by his Dad, Ziggy.       Isael is doing well at home. He remains on TPN/IL (over 12 hours) with reduced electrolytes. He is now eating very well he feels about 50% of his daily intake pre transplant.  Drinking is significantly harder for him as he just does not feel thirsty.  He continues to take ativan almost every morning but states he has very little to no nausea.  He remains on protonix BID.He is stooling daily soft and formed. He has a known history of c.diff and was treated with PO vancomycin x2. He has more energy and yesterday went outside to kick around the soccer ball with a friend.  He is taking his liquid itraconazole since last week however finds it very unpalatable.   No issues sleeping. He has no complaints of pain. He takes ativan once a day but is not having any nausea.     Review of Systems: Pertinent positives include those mentioned in interval events. A complete review of systems was performed and is otherwise negative.      Medications:  Itraconazole 200 mg PO BID  Protonix 40 mg PO BID  Tacrolimus 0.5 mg PO BID  Valtrex 1g PO TID  Ativan 1 mg PO BID    Physical Exam:  /72 (BP Location: Right arm, Patient Position: Sitting, Cuff Size: Adult Regular)   Pulse 94   Temp 98.5  F (36.9  C) (Oral)   Resp 18   Ht 1.753 m (5' 9.02\")   Wt 66.5 kg (146 lb 9.7 oz)   SpO2 97%   BMI 21.64 kg/m     GEN: Sitting in chair, in " good spirits. Talkative with examiner, cooperative  HEENT: Allopecia, NC/AT, Pupils equal and reactive, MMM - no sores or actively bleeding gums, nares patent.   CARD: RRR, no m/r/g, normal S1/S2. Port in place on R side of chest - not accessed. Galvan in place on L side of chest, dressing is c/d/i.  RESP: CTAB, normal WOB, no adventitious sounds  ABD: Soft, NT/ND, flat, NABS  EXTREM: MAEE, WWP  SKIN: Dry, no rashes, no lesions  NEURO: Talkative, CNII-XII grossly intact    Labs:   Results for orders placed or performed in visit on 03/26/21   Phosphorus     Status: None   Result Value Ref Range    Phosphorus 3.5 2.5 - 4.5 mg/dL   Magnesium     Status: None   Result Value Ref Range    Magnesium 1.7 1.6 - 2.3 mg/dL   Comprehensive metabolic panel     Status: Abnormal   Result Value Ref Range    Sodium 138 133 - 144 mmol/L    Potassium 4.3 3.4 - 5.3 mmol/L    Chloride 108 94 - 109 mmol/L    Carbon Dioxide 25 20 - 32 mmol/L    Anion Gap 5 3 - 14 mmol/L    Glucose 113 (H) 70 - 99 mg/dL    Urea Nitrogen 22 7 - 30 mg/dL    Creatinine 0.64 (L) 0.66 - 1.25 mg/dL    GFR Estimate >90 >60 mL/min/[1.73_m2]    GFR Estimate If Black >90 >60 mL/min/[1.73_m2]    Calcium 8.9 8.5 - 10.1 mg/dL    Bilirubin Total 0.6 0.2 - 1.3 mg/dL    Albumin 3.3 (L) 3.4 - 5.0 g/dL    Protein Total 6.4 (L) 6.8 - 8.8 g/dL    Alkaline Phosphatase 58 40 - 150 U/L    ALT 64 0 - 70 U/L    AST 40 0 - 45 U/L   CBC with platelets differential     Status: Abnormal   Result Value Ref Range    WBC 8.6 4.0 - 11.0 10e9/L    RBC Count 3.34 (L) 4.4 - 5.9 10e12/L    Hemoglobin 10.4 (L) 13.3 - 17.7 g/dL    Hematocrit 30.7 (L) 40.0 - 53.0 %    MCV 92 78 - 100 fl    MCH 31.1 26.5 - 33.0 pg    MCHC 33.9 31.5 - 36.5 g/dL    RDW 17.4 (H) 10.0 - 15.0 %    Platelet Count 29 (LL) 150 - 450 10e9/L    Diff Method Manual Differential     % Neutrophils 67.2 %    % Lymphocytes 14.2 %    % Monocytes 18.6 %    % Eosinophils 0.0 %    % Basophils 0.0 %    Absolute Neutrophil 5.8 1.6 -  8.3 10e9/L    Absolute Lymphocytes 1.2 0.8 - 5.3 10e9/L    Absolute Monocytes 1.6 (H) 0.0 - 1.3 10e9/L    Absolute Eosinophils 0.0 0.0 - 0.7 10e9/L    Absolute Basophils 0.0 0.0 - 0.2 10e9/L    Anisocytosis Moderate     Poikilocytosis Slight     Polychromasia Moderate     Microcytes Present     Macrocytes Present     Platelet Estimate Confirming automated cell count          Assessment/Plan:  Artemio is a 21 yo male with 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 His post-transplant complications have included c.difficile (received two courses of PO Vanc) and anorexia (on full TPN/IL). Isael is doing well with improved appetite today we will discontinue his TPN.  We have encouraged him to drink more and will Follow-up on Monday to check his electrolytes.  No transfusions indicated today.    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) and PLAT-05 (CAR19/22) at [place] (loss of CD22 CAR and rejection of CD19 directed CAR without evidence of disease day +21). Of note, his initial post CAR-T therapy was complicated by Grade III CRS necessitating Tociluzimab x3, dopamine pressor support, and steroids. No CRS, neurotoxicity or infectious complications from [place] CAR-T. He received preparative regimen per [id number] with Thiotepa, Fludarabine, Busulfan, and ATG and received a UCBT on 2/16/21. He engrafted on day +20. His Day +21 peripheral VNTRs (3/9) and bone marrow chimerism (3/11) reveals 100% donor chimerism and his flow, morphology, and NGS is negative. FISH negative for ETV6 and JAK2.     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 Continue Tacrolimus 0.5mg PO BID, goal 5-10.      FEN/Renal: Discontinue TPN/IL  Regular diet as tolerated. Dietician to follow. Weekly screening for aHUS/TA-TMA until day+60: LDH qMon/Thurs:  186 (3/17); urine protein/creatinine qTuesday: 0.14 (3/17) - no concerns currently.     Cardiovascular: History of asymptomatic WPW syndrome (diagnosed 2018): no interventions to date. Most recent EKG (1/27). ECHO (1/26) normal, EF 64%. 24h Zio patch holter (1/27-1/28) revealed c/w WPW with no ventricular ectopies present. Cardiology following. BNP elevated 664 (2/16) likely attributed to fluid shifts per cardiology. BNP threshold of concern is >5000. Follow up ECHO in 6 mos (7/2021). Normotensive.     Heme: Transfuse for hemoglobin < 8 (experienced mild dizziness and malaise when hgb in 7s); platelet < 10K. Hx of transfusion reactions (hives): Premedicate with tylenol and benadryl prior to pRBCs and plts. S/p GCSF daily until ANC >/= 2500 x 3 consecutive days completed 3/17. Now prn for ANC < 1000.      Infectious Disease: History of neutropenic fever (3/8): s/p Cefepime with negative blood cultures (3/8 yeast) from CVC and port-a-cath. History of C.Difficile colitis (2/17); treated with PO Vanc (2/17-2/26) and (3/5-3/15). Diarrhea improving. Continue Viral prophylaxis (CMV/HSV sero positive): HD Acyclovir. Weekly CMV neg 3/18. Continue Fungal prophylaxis Significant skin reaction to Vori during his first transplant trouble obtaining Itra levels. Today 3/26 he was switched to Fluconazole 200 mg Daily..Received Pentamidine 2/15; Bactrim beginning Monday 3/22 (no hx of adverse reaction).    Immune: No history of hypogammaglobinemia: IgG level 839 (3/17), continue checking q2 weeks and replace if <400 (no hx replacement at [facility]). Does not require IVIG.     GI: Continue Protonix BID. No longer requiring TUMs prn. Continue ativan BID (can wean next week if PO improving). S/p Ursodiol for VOD prophylaxis (discontinued at discharge).Had been on the VOD study, but decided he wanted to discontinue because did not want to be NPO prior to scans.      Neuro: No longer utilizing oxycodone. L shoulder pain completely resolved.  Of note, history of significant spinal headaches; consider using Candelario needle with lumbar punctures.      Fertility: Sperm collected previously for fertility preservation.    Disposition: RTC Monday for exam, labs, Monday. Possible line removal Friday.      Patient seen and examined with Pediatric BMT Attending Dr. Zavala.    Ju Christian MD   Pediatric BMT Fellow  [facility]    Viky Zavala MD, PhD    Pediatric Blood and Marrow Transplant  [facility]    Total time spent on the following services on the date of the encounter: 45 minutes  Preparing to see patient, chart review, review of outside records, ordering medications, test, procedures, interpretation of labs, imaging and other tests, counseling and educating the patient/family/caregiver, documenting clinical information in the electronic or other health record, and communicating results to the patient/family/caregiver.      Viky BASS MD PhD, saw this patient with the fellow and agree with the fellow s findings and plan of care as documented in the fellow s note.

## 2021-03-26 NOTE — PHARMACY-CONSULT NOTE
Outpatient IV Medication Monitoring Note     Artemio's TPN formula, labs, and nutritional status were reviewed today.       1. Discontinue TPN  2. Continue standard line cares     This was discussed with Viky Zavala and communicated to [facility].       Pharmacy will continue to follow.     Lilia Dominguez, PharmD

## 2021-03-29 ENCOUNTER — HOME INFUSION (PRE-WILLOW HOME INFUSION) (OUTPATIENT)
Dept: PHARMACY | Facility: CLINIC | Age: 21
End: 2021-03-29

## 2021-03-29 ENCOUNTER — ONCOLOGY VISIT (OUTPATIENT)
Dept: TRANSPLANT | Facility: CLINIC | Age: 21
End: 2021-03-29
Attending: PHYSICIAN ASSISTANT
Payer: COMMERCIAL

## 2021-03-29 VITALS
BODY MASS INDEX: 21.09 KG/M2 | DIASTOLIC BLOOD PRESSURE: 66 MMHG | SYSTOLIC BLOOD PRESSURE: 95 MMHG | HEIGHT: 69 IN | TEMPERATURE: 98.4 F | OXYGEN SATURATION: 99 % | WEIGHT: 142.42 LBS | HEART RATE: 88 BPM | RESPIRATION RATE: 18 BRPM

## 2021-03-29 DIAGNOSIS — Z94.81 STATUS POST BONE MARROW TRANSPLANT (H): ICD-10-CM

## 2021-03-29 DIAGNOSIS — C91.01 ACUTE LYMPHOBLASTIC LEUKEMIA (ALL) IN REMISSION (H): ICD-10-CM

## 2021-03-29 LAB
ALBUMIN SERPL-MCNC: 3.4 G/DL (ref 3.4–5)
ALP SERPL-CCNC: 56 U/L (ref 40–150)
ALT SERPL W P-5'-P-CCNC: 63 U/L (ref 0–70)
ANION GAP SERPL CALCULATED.3IONS-SCNC: 5 MMOL/L (ref 3–14)
ANISOCYTOSIS BLD QL SMEAR: ABNORMAL
AST SERPL W P-5'-P-CCNC: 35 U/L (ref 0–45)
BASOPHILS # BLD AUTO: 0 10E9/L (ref 0–0.2)
BASOPHILS NFR BLD AUTO: 0 %
BILIRUB SERPL-MCNC: 0.7 MG/DL (ref 0.2–1.3)
BUN SERPL-MCNC: 14 MG/DL (ref 7–30)
CALCIUM SERPL-MCNC: 8.9 MG/DL (ref 8.5–10.1)
CHLORIDE SERPL-SCNC: 109 MMOL/L (ref 94–109)
CO2 SERPL-SCNC: 24 MMOL/L (ref 20–32)
CREAT SERPL-MCNC: 0.87 MG/DL (ref 0.66–1.25)
CREAT UR-MCNC: 158 MG/DL
DIFFERENTIAL METHOD BLD: ABNORMAL
EOSINOPHIL # BLD AUTO: 0 10E9/L (ref 0–0.7)
EOSINOPHIL NFR BLD AUTO: 0.9 %
ERYTHROCYTE [DISTWIDTH] IN BLOOD BY AUTOMATED COUNT: 19.4 % (ref 10–15)
GFR SERPL CREATININE-BSD FRML MDRD: >90 ML/MIN/{1.73_M2}
GLUCOSE SERPL-MCNC: 139 MG/DL (ref 70–99)
HCT VFR BLD AUTO: 34.9 % (ref 40–53)
HGB BLD-MCNC: 11.3 G/DL (ref 13.3–17.7)
LABORATORY COMMENT REPORT: NORMAL
LDH SERPL L TO P-CCNC: 264 U/L (ref 85–227)
LYMPHOCYTES # BLD AUTO: 1 10E9/L (ref 0.8–5.3)
LYMPHOCYTES NFR BLD AUTO: 21.1 %
MACROCYTES BLD QL SMEAR: PRESENT
MAGNESIUM SERPL-MCNC: 1.4 MG/DL (ref 1.6–2.3)
MCH RBC QN AUTO: 30.9 PG (ref 26.5–33)
MCHC RBC AUTO-ENTMCNC: 32.4 G/DL (ref 31.5–36.5)
MCV RBC AUTO: 95 FL (ref 78–100)
METAMYELOCYTES # BLD: 0 10E9/L
METAMYELOCYTES NFR BLD MANUAL: 0.9 %
MICROCYTES BLD QL SMEAR: PRESENT
MONOCYTES # BLD AUTO: 2.1 10E9/L (ref 0–1.3)
MONOCYTES NFR BLD AUTO: 43.9 %
MYELOCYTES # BLD: 0 10E9/L
MYELOCYTES NFR BLD MANUAL: 0.9 %
NEUTROPHILS # BLD AUTO: 1.6 10E9/L (ref 1.6–8.3)
NEUTROPHILS NFR BLD AUTO: 32.3 %
PHOSPHATE SERPL-MCNC: 4 MG/DL (ref 2.5–4.5)
PLATELET # BLD AUTO: 36 10E9/L (ref 150–450)
PLATELET # BLD EST: ABNORMAL 10*3/UL
POIKILOCYTOSIS BLD QL SMEAR: SLIGHT
POTASSIUM SERPL-SCNC: 4.2 MMOL/L (ref 3.4–5.3)
PROT SERPL-MCNC: 6.5 G/DL (ref 6.8–8.8)
PROT UR-MCNC: 0.11 G/L
PROT/CREAT 24H UR: 0.07 G/G CR (ref 0–0.2)
RBC # BLD AUTO: 3.66 10E12/L (ref 4.4–5.9)
RBC INCLUSIONS BLD: SLIGHT
SARS-COV-2 RNA RESP QL NAA+PROBE: NEGATIVE
SARS-COV-2 RNA RESP QL NAA+PROBE: NORMAL
SODIUM SERPL-SCNC: 138 MMOL/L (ref 133–144)
SPECIMEN SOURCE: NORMAL
SPECIMEN SOURCE: NORMAL
WBC # BLD AUTO: 4.8 10E9/L (ref 4–11)

## 2021-03-29 PROCEDURE — U0003 INFECTIOUS AGENT DETECTION BY NUCLEIC ACID (DNA OR RNA); SEVERE ACUTE RESPIRATORY SYNDROME CORONAVIRUS 2 (SARS-COV-2) (CORONAVIRUS DISEASE [COVID-19]), AMPLIFIED PROBE TECHNIQUE, MAKING USE OF HIGH THROUGHPUT TECHNOLOGIES AS DESCRIBED BY CMS-2020-01-R: HCPCS | Performed by: PEDIATRICS

## 2021-03-29 PROCEDURE — 84100 ASSAY OF PHOSPHORUS: CPT | Performed by: PEDIATRICS

## 2021-03-29 PROCEDURE — 80053 COMPREHEN METABOLIC PANEL: CPT | Performed by: PEDIATRICS

## 2021-03-29 PROCEDURE — 83615 LACTATE (LD) (LDH) ENZYME: CPT | Performed by: PEDIATRICS

## 2021-03-29 PROCEDURE — 85025 COMPLETE CBC W/AUTO DIFF WBC: CPT | Performed by: PEDIATRICS

## 2021-03-29 PROCEDURE — 84156 ASSAY OF PROTEIN URINE: CPT | Performed by: PEDIATRICS

## 2021-03-29 PROCEDURE — G0463 HOSPITAL OUTPT CLINIC VISIT: HCPCS

## 2021-03-29 PROCEDURE — 83735 ASSAY OF MAGNESIUM: CPT | Performed by: PEDIATRICS

## 2021-03-29 PROCEDURE — 99215 OFFICE O/P EST HI 40 MIN: CPT | Performed by: NURSE PRACTITIONER

## 2021-03-29 PROCEDURE — U0005 INFEC AGEN DETEC AMPLI PROBE: HCPCS | Performed by: PEDIATRICS

## 2021-03-29 PROCEDURE — 36592 COLLECT BLOOD FROM PICC: CPT | Performed by: PEDIATRICS

## 2021-03-29 ASSESSMENT — PAIN SCALES - GENERAL: PAINLEVEL: NO PAIN (0)

## 2021-03-29 ASSESSMENT — MIFFLIN-ST. JEOR: SCORE: 1637.25

## 2021-03-29 NOTE — NURSING NOTE
"Chief Complaint   Patient presents with     RECHECK     Patient being seen for H and P with Labs       BP 95/66 (BP Location: Left arm, Patient Position: Sitting, Cuff Size: Adult Regular)   Pulse 88   Temp 98.4  F (36.9  C) (Oral)   Resp 18   Ht 1.754 m (5' 9.06\")   Wt 64.6 kg (142 lb 6.7 oz)   SpO2 99%   BMI 21.00 kg/m      Byron Schwab LPN  March 29, 2021  "

## 2021-03-29 NOTE — PATIENT INSTRUCTIONS
Return to [facility] for labs and exam with POLLO on 4/2. Please hold Tacrolimus prior to visit for blood drug level, and take this medication after level obtained.    Infusion needs: Possible platelets prior to procedure 4/2.    Patient has PICC, Central line, CVC line, to be drawn off of per lab.     Medication changes: Start Magnesium Oxide 400 mg BID    Care plan changes: Tacrolimus level at home tomorrow morning.     Contact information  During business hours (7:30am-4:30pm):   To leave a non-urgent voicemail: call triage line [phone]    To call for time-sensitive needs or concerns : call clinic  [phone]    Evenings after 4:30pm, weekends, and holidays:   For any needs or concerns: call for BMT fellow at [phone] in the case of an emergency    Thank you!     Patient seen on 04/02 as of 04/06 @ 12:44pm. SADI

## 2021-03-29 NOTE — PROGRESS NOTES
Pediatric BMT Clinic Progress Note  March 29, 2021    Isael is a 21 yo male with a history of multiply relapsed ALL now day +41 s/p UCBT following MAC per 2013-09. He presents to [facility] today for routine follow-up and labs.     Isael reports he has been doing well since his last appointment. He stopped TPN/IL on Friday and his appetite continues to improve. He estimates he is eating about 75% of his baseline PO intake and is drinking 2 water bottles daily. No nausea or vomiting over the weekend and has not needed any PRN ativan. His stools remain soft and formed and he is urinating without issue. Isael reports he is sleeping well and has good energy throughout the day. Denies fever, rash, URI symptoms, nausea, vomiting, constipation, pain, bruising or active bleeding.     Review of Systems: Pertinent positives include those mentioned in interval events. A complete review of systems was performed and is otherwise negative.      Medications:  Fluconazole 200 mg PO daily  Protonix 40 mg PO daily  Tacrolimus 0.5 mg PO BID  Valtrex 1g PO TID  Ativan 1 mg PO BID PRN  Mag-ox 400 mg PO BID    Physical Exam:  Vital Signs for Peds 3/29/2021   SYSTOLIC 95   DIASTOLIC 66   PULSE 88   TEMPERATURE 98.4   RESPIRATIONS 18   WEIGHT (kg) 64.6 kg   HEIGHT (cm) 175.4 cm   BMI 21   pain    O2 99     GEN: Sitting in chair, in good spirits. Talkative with examiner, cooperative  HEENT: Alopecia, NC/AT, sclera anicteric, PERRL, nares patent, OP clear without lesions or erythema, MMM.   CARD: RRR, no m/r/g, normal S1/S2.   RESP: CTAB, normal WOB, no adventitious lung sounds  ABD: Soft, NT/ND, flat, NABS  EXTREM: MAEE, WWP.  SKIN: Dry, pale, no rashes, lesions, erythema to visible skin.  NEURO: no focal deficits  ACCESS: unaccessed port R chest. DL Galvan L chest c/d/i.    Labs:   Results for orders placed or performed in visit on 03/29/21   CBC with platelets differential     Status: Abnormal (In process)   Result Value Ref Range     WBC 4.8 4.0 - 11.0 10e9/L    RBC Count 3.66 (L) 4.4 - 5.9 10e12/L    Hemoglobin 11.3 (L) 13.3 - 17.7 g/dL    Hematocrit 34.9 (L) 40.0 - 53.0 %    MCV 95 78 - 100 fl    MCH 30.9 26.5 - 33.0 pg    MCHC 32.4 31.5 - 36.5 g/dL    RDW 19.4 (H) 10.0 - 15.0 %    Platelet Count 36 (LL) 150 - 450 10e9/L    Diff Method PENDING    Comprehensive metabolic panel     Status: None (In process)   Result Value Ref Range    Sodium 138 133 - 144 mmol/L    Potassium 4.2 3.4 - 5.3 mmol/L    Chloride 109 94 - 109 mmol/L    Carbon Dioxide PENDING 20 - 32 mmol/L    Anion Gap PENDING 3 - 14 mmol/L    Glucose PENDING 70 - 99 mg/dL    Urea Nitrogen PENDING 7 - 30 mg/dL    Creatinine PENDING 0.66 - 1.25 mg/dL    GFR Estimate PENDING >60 mL/min/[1.73_m2]    GFR Estimate If Black PENDING >60 mL/min/[1.73_m2]    Calcium PENDING 8.5 - 10.1 mg/dL    Bilirubin Total PENDING 0.2 - 1.3 mg/dL    Albumin PENDING 3.4 - 5.0 g/dL    Protein Total PENDING 6.8 - 8.8 g/dL    Alkaline Phosphatase PENDING 40 - 150 U/L    ALT PENDING 0 - 70 U/L    AST PENDING 0 - 45 U/L       Assessment/Plan:  Artemio is a 21 yo male with history of multiply relapsed ALL now day +41 s/p UCBT following MAC per 2013-09. His post-transplant complications have included c.difficile (received two courses of PO Vanc) and anorexia (on full TPN/IL). Isael is clinically well appearing without acute concerns today. TPN/IL discontinued 3/26- mild hypomagnesemia today- will start Mag oxide BID. Tacrolimus level tomorrow at home.     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 Of note, his initial post CAR-T therapy was complicated by Grade III CRS necessitating Tociluzimab x3, dopamine pressor support, and steroids. No  CRS, neurotoxicity or infectious complications from [place] CAR-T. He received preparative regimen per [id number] with Thiotepa, Fludarabine, Busulfan, and ATG and received a UCBT on 2/16/21. He engrafted on day +20. His Day +21 peripheral VNTRs (3/9) and bone marrow chimerism (3/11) reveals 100% donor chimerism and his flow, morphology, and NGS is negative. FISH negative for ETV6 and JAK2 and chromosomes pending.     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 Continue Tacrolimus 0.5mg PO BID, goal 5-10. Repeat level tomorrow morning with [facility].      FEN/Renal: s/p TPN/IL 3/26/21. Regular diet as tolerated. Dietician to follow. Weekly screening for aHUS/TA-TMA until day+60. Weekly LDH: 264 (3/29); Weekly urine protein/creatinine: 0.07 (3/29) - no concerns currently. Magnesium 1.4 today- will start Mag Ox BID.      Cardiovascular: History of asymptomatic WPW syndrome (diagnosed 2018): no interventions to date. Most recent EKG (1/27). ECHO (1/26) normal, EF 64%. 24h Zio patch holter (1/27-1/28) revealed c/w WPW with no ventricular ectopies present. Cardiology following. BNP elevated 664 (2/16) likely attributed to fluid shifts per cardiology. BNP threshold of concern is >5000. Follow up ECHO in 6 mos (7/2021). Normotensive.     Heme: Transfuse for hemoglobin < 8 (experienced mild dizziness and malaise when hgb in 7s); platelet < 10K. Hx of transfusion reactions (hives): Premedicate with tylenol and benadryl prior to pRBCs and plts. GCSF prn for ANC < 1000.      Infectious Disease: History of C.Difficile colitis (2/17); treated with PO Vanc (2/17-2/26) and (3/5-3/15). Continue Viral prophylaxis (CMV/HSV sero positive): HD Acyclovir. Weekly CMV neg 3/18. Continue Fungal prophylaxis:  Fluconazole 200 mg daily. Continue PCP ppx: Bactrim BID q Mon/Tues (no hx of adverse reaction).    Immune: No history of hypogammaglobinemia: IgG level 839 (3/17), continue checking q2 weeks and replace if <400 (no hx replacement at  [facility]).      GI: Continue Protonix daily. Continue ativan BID PRN. S/p Ursodiol for VOD prophylaxis. Had been on the VOD study, but decided he wanted to discontinue because did not want to be NPO prior to scans.      Neuro: L shoulder pain completely resolved. Of note, history of significant spinal headaches; consider using Candelario needle with lumbar punctures.      Fertility: Sperm collected previously for fertility preservation.    Disposition: RTC Friday 4/2 for exam, labs, possible platelets and line removal.    Gabriella YOUSIF-PC  [facility]  Pediatric Blood and Marrow Transplant    I spent a total of 45 minutes with Artemio Nino on the date of encounter doing chart review, history and exam, review of labs/imaging, discussion with the family, documentation and further activities as noted above.

## 2021-03-30 ENCOUNTER — MEDICAL CORRESPONDENCE (OUTPATIENT)
Dept: HEALTH INFORMATION MANAGEMENT | Facility: CLINIC | Age: 21
End: 2021-03-30

## 2021-03-30 ENCOUNTER — ONCOLOGY VISIT (OUTPATIENT)
Dept: TRANSPLANT | Facility: CLINIC | Age: 21
End: 2021-03-30
Attending: PHYSICIAN ASSISTANT
Payer: COMMERCIAL

## 2021-03-30 ENCOUNTER — HOME INFUSION (PRE-WILLOW HOME INFUSION) (OUTPATIENT)
Dept: PHARMACY | Facility: CLINIC | Age: 21
End: 2021-03-30

## 2021-03-30 DIAGNOSIS — C91.02 ACUTE LYMPHOBLASTIC LEUKEMIA (ALL) IN RELAPSE (H): ICD-10-CM

## 2021-03-30 DIAGNOSIS — Z51.81 ENCOUNTER FOR THERAPEUTIC DRUG MONITORING: ICD-10-CM

## 2021-03-30 LAB
CMV DNA SPEC NAA+PROBE-ACNC: NORMAL [IU]/ML
CMV DNA SPEC NAA+PROBE-LOG#: NORMAL {LOG_IU}/ML
SPECIMEN SOURCE: NORMAL
TACROLIMUS BLD-MCNC: 14.2 UG/L (ref 5–15)
TME LAST DOSE: NORMAL H

## 2021-03-30 PROCEDURE — 80197 ASSAY OF TACROLIMUS: CPT | Performed by: NURSE PRACTITIONER

## 2021-03-30 PROCEDURE — 99213 OFFICE O/P EST LOW 20 MIN: CPT | Mod: 95 | Performed by: PHYSICIAN ASSISTANT

## 2021-03-30 RX ORDER — TACROLIMUS 1 MG/1
CAPSULE ORAL
Qty: 60 CAPSULE | Refills: 3 | COMMUNITY
Start: 2021-03-30 | End: 2022-02-08

## 2021-03-30 NOTE — PROGRESS NOTES
[facility] Monitoring Note, Telephone Note    Artemio Nino  March 30, 2021    Today's Tacrolimus level is: 14.2.    Goals for therapy =  5-10 ug/L  Current tacrolimus dose is: 0.5 mg PO BID    A: Artemio Nino's current trough level is supratherapeutic.    Drug interactions include fluconazole, recently receiving itraconazole as well though this medication has been discontinued.    P: Confirmed with Isael by telephone that he is no longer taking itraconazole. He has not yet received his Tacrolimus suspension that was prescribed on 3/22 and sent through [facility] pharmacy. In discussion with [facility] pharmacy, they had attempted phone contact with Isael to set up a delivery time, however had not yet received a call back. Shared [facility] phone number with Isael and coached to contact pharmacy for urgent delivery. Instructed Isael to hold all Tacrolimus dosing tonight. Tomorrow morning, if tacrolimus suspension is in hand, take 0.4 mL by mouth. If tacrolimus suspension is not yet in hand tomorrow morning, take 0.5 mg capsule by mouth. Tomorrow evening, take 0.3 mL suspension by mouth. If tacrolimus suspension is unable to be delivered prior to tomorrow evening's dose time, instructed Isael to contact BMT team for further instructions. Once suspension is in hand, plan for dosing to be 0.4 mL PO in AM, 0.3 mL PO in PM. Will plan for repeat Tacrolimus level Friday 4/2. Isael expressed understanding of and agreement with the plan, and will hold his morning dose Friday 4/2.    The medication list has been updated, the repeat level has been ordered and family has been notified.      SEBAS Christian (Flesher), PA-C  Pediatric [facility] Mar[facility] Transplant [facility]  [facility]  Pager: [phone]  Fax: [phone]      Total time on telephone: 10 minutes with Isael and [facility] pharmacy  Additional time spent in chart review, consultation with PharmD,  documentation: 15 minutes

## 2021-03-30 NOTE — PHARMACY-CONSULT NOTE
DRUG LEVEL MONITORING NOTE      Tacrolimus Monitoring Note     D:  Current Tacrolimus dose:  0.5 mg BID     Tacrolimus level: 14.2  Goals for therapy = 5-10     A:  Current trough level is above the desired range.  Drug interactions include: fluconazole      P: Change to tacrolimus 0.4 mg every morning and 0.3 mg every evening.  Recommend holding evening dose on 3/30/31.  Discussed with HANNAH Jacob.  Will recheck a level on Friday 4/2/21.     Pharmacy will continue to follow.   Aelxa Ramirez RPH

## 2021-03-31 NOTE — PROGRESS NOTES
This is a recent snapshot of the patient's [facility] medical record.  For current drug dose and complete information and questions, call [phone] or In Basket pool, [facility] home infusion ([id number])  CSN Number:  [id number]

## 2021-04-01 RX ORDER — LIDOCAINE 40 MG/G
CREAM TOPICAL
Status: CANCELLED | OUTPATIENT
Start: 2021-04-01

## 2021-04-01 RX ORDER — ACETAMINOPHEN 325 MG/1
650 TABLET ORAL ONCE
Status: CANCELLED
Start: 2021-04-01 | End: 2021-04-01

## 2021-04-01 RX ORDER — DIPHENHYDRAMINE HYDROCHLORIDE 50 MG/ML
50 INJECTION INTRAMUSCULAR; INTRAVENOUS EVERY 6 HOURS PRN
Status: CANCELLED
Start: 2021-04-01

## 2021-04-01 NOTE — PROGRESS NOTES
This is a recent snapshot of the patient's [facility] medical record.  For current drug dose and complete information and questions, call [phone] or In Basket pool, [facility] home infusion ([id number])  CSN Number:  [id number]

## 2021-04-02 ENCOUNTER — ONCOLOGY VISIT (OUTPATIENT)
Dept: TRANSPLANT | Facility: CLINIC | Age: 21
End: 2021-04-02
Attending: NURSE PRACTITIONER
Payer: COMMERCIAL

## 2021-04-02 ENCOUNTER — HOME INFUSION (PRE-WILLOW HOME INFUSION) (OUTPATIENT)
Dept: PHARMACY | Facility: CLINIC | Age: 21
End: 2021-04-02

## 2021-04-02 ENCOUNTER — INFUSION THERAPY VISIT (OUTPATIENT)
Dept: INFUSION THERAPY | Facility: CLINIC | Age: 21
End: 2021-04-02
Attending: NURSE PRACTITIONER
Payer: COMMERCIAL

## 2021-04-02 VITALS
WEIGHT: 138.23 LBS | OXYGEN SATURATION: 98 % | HEART RATE: 93 BPM | DIASTOLIC BLOOD PRESSURE: 65 MMHG | BODY MASS INDEX: 20.47 KG/M2 | HEIGHT: 69 IN | RESPIRATION RATE: 18 BRPM | TEMPERATURE: 98.3 F | SYSTOLIC BLOOD PRESSURE: 97 MMHG

## 2021-04-02 DIAGNOSIS — C91.02 ACUTE LYMPHOBLASTIC LEUKEMIA (ALL) IN RELAPSE (H): Primary | ICD-10-CM

## 2021-04-02 DIAGNOSIS — Z11.59 ENCOUNTER FOR SCREENING FOR OTHER VIRAL DISEASES: ICD-10-CM

## 2021-04-02 DIAGNOSIS — C91.01 ACUTE LYMPHOBLASTIC LEUKEMIA (ALL) IN REMISSION (H): Primary | ICD-10-CM

## 2021-04-02 DIAGNOSIS — Z94.81 STATUS POST BONE MARROW TRANSPLANT (H): ICD-10-CM

## 2021-04-02 DIAGNOSIS — C91.02 ACUTE LYMPHOBLASTIC LEUKEMIA (ALL) IN RELAPSE (H): ICD-10-CM

## 2021-04-02 LAB
ALBUMIN SERPL-MCNC: 3.8 G/DL (ref 3.4–5)
ALP SERPL-CCNC: 61 U/L (ref 40–150)
ALT SERPL W P-5'-P-CCNC: 66 U/L (ref 0–70)
ANION GAP SERPL CALCULATED.3IONS-SCNC: 5 MMOL/L (ref 3–14)
ANISOCYTOSIS BLD QL SMEAR: SLIGHT
AST SERPL W P-5'-P-CCNC: 44 U/L (ref 0–45)
BASOPHILS # BLD AUTO: 0 10E9/L (ref 0–0.2)
BASOPHILS NFR BLD AUTO: 0 %
BILIRUB SERPL-MCNC: 0.7 MG/DL (ref 0.2–1.3)
BUN SERPL-MCNC: 22 MG/DL (ref 7–30)
CALCIUM SERPL-MCNC: 9.2 MG/DL (ref 8.5–10.1)
CHLORIDE SERPL-SCNC: 108 MMOL/L (ref 94–109)
CO2 SERPL-SCNC: 25 MMOL/L (ref 20–32)
CREAT SERPL-MCNC: 1.27 MG/DL (ref 0.66–1.25)
DACRYOCYTES BLD QL SMEAR: SLIGHT
DIFFERENTIAL METHOD BLD: ABNORMAL
EOSINOPHIL # BLD AUTO: 0 10E9/L (ref 0–0.7)
EOSINOPHIL NFR BLD AUTO: 0 %
ERYTHROCYTE [DISTWIDTH] IN BLOOD BY AUTOMATED COUNT: 19.7 % (ref 10–15)
GFR SERPL CREATININE-BSD FRML MDRD: 79 ML/MIN/{1.73_M2}
GLUCOSE SERPL-MCNC: 111 MG/DL (ref 70–99)
HCT VFR BLD AUTO: 38.3 % (ref 40–53)
HGB BLD-MCNC: 12.3 G/DL (ref 13.3–17.7)
IGG SERPL-MCNC: 711 MG/DL (ref 610–1616)
LYMPHOCYTES # BLD AUTO: 0.8 10E9/L (ref 0.8–5.3)
LYMPHOCYTES NFR BLD AUTO: 10.7 %
MACROCYTES BLD QL SMEAR: PRESENT
MAGNESIUM SERPL-MCNC: 1.9 MG/DL (ref 1.6–2.3)
MCH RBC QN AUTO: 30.4 PG (ref 26.5–33)
MCHC RBC AUTO-ENTMCNC: 32.1 G/DL (ref 31.5–36.5)
MCV RBC AUTO: 95 FL (ref 78–100)
METAMYELOCYTES # BLD: 0.1 10E9/L
METAMYELOCYTES NFR BLD MANUAL: 1.8 %
MICROCYTES BLD QL SMEAR: PRESENT
MONOCYTES # BLD AUTO: 3.5 10E9/L (ref 0–1.3)
MONOCYTES NFR BLD AUTO: 44.6 %
NEUTROPHILS # BLD AUTO: 3.3 10E9/L (ref 1.6–8.3)
NEUTROPHILS NFR BLD AUTO: 42.9 %
PHOSPHATE SERPL-MCNC: 4.9 MG/DL (ref 2.5–4.5)
PLATELET # BLD AUTO: 71 10E9/L (ref 150–450)
PLATELET # BLD EST: ABNORMAL 10*3/UL
POIKILOCYTOSIS BLD QL SMEAR: SLIGHT
POTASSIUM SERPL-SCNC: 4.4 MMOL/L (ref 3.4–5.3)
PROT SERPL-MCNC: 7 G/DL (ref 6.8–8.8)
RBC # BLD AUTO: 4.04 10E12/L (ref 4.4–5.9)
SODIUM SERPL-SCNC: 138 MMOL/L (ref 133–144)
TACROLIMUS BLD-MCNC: 7.6 UG/L (ref 5–15)
TME LAST DOSE: NORMAL H
WBC # BLD AUTO: 7.8 10E9/L (ref 4–11)

## 2021-04-02 PROCEDURE — 85025 COMPLETE CBC W/AUTO DIFF WBC: CPT | Performed by: NURSE PRACTITIONER

## 2021-04-02 PROCEDURE — G0463 HOSPITAL OUTPT CLINIC VISIT: HCPCS

## 2021-04-02 PROCEDURE — 80053 COMPREHEN METABOLIC PANEL: CPT | Performed by: NURSE PRACTITIONER

## 2021-04-02 PROCEDURE — 36592 COLLECT BLOOD FROM PICC: CPT | Performed by: NURSE PRACTITIONER

## 2021-04-02 PROCEDURE — 83735 ASSAY OF MAGNESIUM: CPT | Performed by: NURSE PRACTITIONER

## 2021-04-02 PROCEDURE — 80197 ASSAY OF TACROLIMUS: CPT | Performed by: NURSE PRACTITIONER

## 2021-04-02 PROCEDURE — 84100 ASSAY OF PHOSPHORUS: CPT | Performed by: NURSE PRACTITIONER

## 2021-04-02 PROCEDURE — 82784 ASSAY IGA/IGD/IGG/IGM EACH: CPT | Performed by: NURSE PRACTITIONER

## 2021-04-02 PROCEDURE — 99215 OFFICE O/P EST HI 40 MIN: CPT | Performed by: NURSE PRACTITIONER

## 2021-04-02 PROCEDURE — 999N000102 HC STATISTIC NO CHARGE CLINIC VISIT

## 2021-04-02 ASSESSMENT — PAIN SCALES - GENERAL: PAINLEVEL: NO PAIN (0)

## 2021-04-02 ASSESSMENT — MIFFLIN-ST. JEOR: SCORE: 1621.38

## 2021-04-02 NOTE — PROGRESS NOTES
10/5/2020      RE: Christopher Gorman  [address]  [facility] [address]       Pediatric Endocrinology Follow-up Consultation    Patient: Christopher Gorman MRN# [id number]   YOB: 2006 Age: 13 year 11 month old   Date of Visit: Oct 5, 2020    Dear Dr. Jose Lucasuble:    I had the pleasure of seeing your patient, Christopher Gorman in the Pediatric Endocrinology Clinic, [facility], on Oct 5, 2020 for a follow-up consultation of vitamin D deficiency and osteoporosis secondary to chronic glucocortoid therapy.         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 He was previously seen by my colleague, Dr. Delatorre, and was last seen in 2018.     To review, he was diagnosed with DMD in April 2014. Steroid treatment (prednisone) was started in 2014. Christopher was found to have a vertebral compression fracture and received zoledronate therapy in July 2019. He had severe hypocalcemia following the infusion requiring hospitalization.     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 He is fully donor engrafted with no signs of GVHD. He has good count recovery. He presents to [facility] today for routine follow-up and labs.     Isael continues to do well post transplant with no complaints. TPN was stopped on 3/26, he is eating regular meals and drinking about 2-3 16 oz bottles of fluid per day. His most recent Tacrolimus level was elevated, he was transitioned from capsules to suspension and his dose was reduced, he is scheduled to have a level drawn today. His creatinine has doubled since 3/26, likely due to reduced fluid intake and elevated Tacrolimus level.  His weight has also been trending down. He reports having good energy with no nausea or vomiting. He remains afebrile with no rash, fever, URI symptoms. He was recently starting on oral magnesium supplement.  Review of Systems: Pertinent positives include those mentioned in interval events. A complete review of systems was performed and is otherwise negative.      Medications:  Fluconazole 200 mg PO daily  Protonix 40 mg PO daily  Tacrolimus 0.4 mg am, 0.3 mg pm  Valtrex 1g PO TID  Ativan 1 mg PO BID PRN  Mag-ox 400 mg PO BID    Physical Exam:  Vital Signs for Peds 4/2/2021   SYSTOLIC 97   DIASTOLIC 65   PULSE 93   TEMPERATURE 98.3   RESPIRATIONS 18   WEIGHT (kg) 62.7 kg   HEIGHT (cm) 175.9 cm   BMI 20.26   pain    O2 98     GEN: Lying on exam bed, alert, talkative, pleasant, no distress.  HEENT: Alopecia, NC/AT, sclera anicteric, PERRL, nares patent, OP clear without lesions or erythema, MMM.   CARD: RRR, no m/r/g, normal S1/S2.   RESP: CTAB, normal WOB, no adventitious lung sounds  ABD: Soft, NT/ND, flat, NABS  EXTREM: MAEE, WWP.  SKIN: Dry, pale, no rashes, lesions, erythema to visible skin.  NEURO: no focal deficits  ACCESS: unaccessed port R chest. DL Galvan L chest  July 2019. He had severe hypocalcemia following the infusion requiring hospitalization.    Christopher continues to take 5 20 mg tablets of prednisone twice weekly.    Christopher takes 500 mg calcium carbonate twice daily and 1000 mg Tums twice daily. He is also taking vitamin D.     Since his zoledronate infusion Christopher has not had any new falls or back pain.     History was obtained from Christopher and his parents, his brother was also present.           Social History:     Social history was reviewed and is unchanged. Refer to the initial note.         Family History:     Family History   Problem Relation Age of Onset     Diabetes Maternal Grandmother      Thyroid Disease Maternal Grandmother      Thyroid Disease Mother        Family history was reviewed and is unchanged. Refer to the initial note.         Allergies:   No Known Allergies          Medications:     Current Outpatient Medications   Medication Sig Dispense Refill     acetaminophen (TYLENOL) 325 MG tablet Take 2 tablets (650 mg) by mouth every 6 hours as needed for mild pain or fever 1 Bottle 0     Ascorbic Acid (VITAMIN C PO) Take 3 tablets by mouth daily       CALCIUM ANTACID 500 MG chewable tablet CHEW SWALLOW 4 TABLETS BY MOUTH 3 TIMES DAILY START 1 WEEK PRIOR TO ZOMETA INFUSION CONTINUE 1 WEEK AFTER INFUSION  0     calcium carbonate 500 mg, elemental, (OSCAL) 500 MG tablet Take 1 tablet (500 mg) by mouth 2 times daily 60 tablet 0     carvedilol (COREG) 6.25 MG tablet Take 12.5 mg every morning and 6.25 mg every evening with meals x 2 weeks, then increase to 12.5 mg  tablet 11     Cholecalciferol (VITAMIN D3) 50 MCG (2000 UT) CAPS Take 2 capsules by mouth daily 60 capsule 5     enalapril (VASOTEC) 2.5 MG tablet Take 2 tablets (5 mg) by mouth 2 times daily 180 tablet 3     omeprazole (PRILOSEC) 10 MG DR capsule OPEN 1 CAPSULE AND SPRINKLE CONTENTS ON A SPOONFUL OF FOOD, ONCE DAILY, FRIDAY, SATURDAY, SUNDAY AND MONDAY. 16 capsule 3     order for [facility] for  c/d/i.    Labs:   Results for orders placed or performed in visit on 04/02/21   CBC with platelets differential     Status: Abnormal   Result Value Ref Range    WBC 7.8 4.0 - 11.0 10e9/L    RBC Count 4.04 (L) 4.4 - 5.9 10e12/L    Hemoglobin 12.3 (L) 13.3 - 17.7 g/dL    Hematocrit 38.3 (L) 40.0 - 53.0 %    MCV 95 78 - 100 fl    MCH 30.4 26.5 - 33.0 pg    MCHC 32.1 31.5 - 36.5 g/dL    RDW 19.7 (H) 10.0 - 15.0 %    Platelet Count 71 (L) 150 - 450 10e9/L    Diff Method Manual Differential     % Neutrophils 42.9 %    % Lymphocytes 10.7 %    % Monocytes 44.6 %    % Eosinophils 0.0 %    % Basophils 0.0 %    % Metamyelocytes 1.8 %    Absolute Neutrophil 3.3 1.6 - 8.3 10e9/L    Absolute Lymphocytes 0.8 0.8 - 5.3 10e9/L    Absolute Monocytes 3.5 (H) 0.0 - 1.3 10e9/L    Absolute Eosinophils 0.0 0.0 - 0.7 10e9/L    Absolute Basophils 0.0 0.0 - 0.2 10e9/L    Absolute Metamyelocytes 0.1 (H) 0 10e9/L    Anisocytosis Slight     Poikilocytosis Slight     Teardrop Cells Slight     Microcytes Present     Macrocytes Present     Platelet Estimate Decreased    Comprehensive metabolic panel     Status: Abnormal   Result Value Ref Range    Sodium 138 133 - 144 mmol/L    Potassium 4.4 3.4 - 5.3 mmol/L    Chloride 108 94 - 109 mmol/L    Carbon Dioxide 25 20 - 32 mmol/L    Anion Gap 5 3 - 14 mmol/L    Glucose 111 (H) 70 - 99 mg/dL    Urea Nitrogen 22 7 - 30 mg/dL    Creatinine 1.27 (H) 0.66 - 1.25 mg/dL    GFR Estimate 79 >60 mL/min/[1.73_m2]    GFR Estimate If Black >90 >60 mL/min/[1.73_m2]    Calcium 9.2 8.5 - 10.1 mg/dL    Bilirubin Total 0.7 0.2 - 1.3 mg/dL    Albumin 3.8 3.4 - 5.0 g/dL    Protein Total 7.0 6.8 - 8.8 g/dL    Alkaline Phosphatase 61 40 - 150 U/L    ALT 66 0 - 70 U/L    AST 44 0 - 45 U/L   Magnesium     Status: None   Result Value Ref Range    Magnesium 1.9 1.6 - 2.3 mg/dL   Phosphorus     Status: Abnormal   Result Value Ref Range    Phosphorus 4.9 (H) 2.5 - 4.5 mg/dL       Assessment/Plan:  Artemio is a 21 yo male with  Micah Lift. 1 Units 0     predniSONE (DELTASONE) 20 MG tablet TAKE 5 TABLETS BY MOUTH TWICE A WEEK 40 tablet 3     testosterone cypionate (DEPOTESTOSTERONE) 200 MG/ML injection Inject 0.5 mLs (100 mg) into the muscle every 28 days 0.5 mL 1             Review of Systems:   Gen: Negative  Eye: Negative, no vision concerns.  ENT: Negative, no hearing concerns. He has had braces since July 2019. Some of his baby teeth needed to be pulled.   Pulmonary: He is being fitted for a BIPAP machine today.  Cardio: Negative, no dizziness or fainting.   Gastrointestinal: Negative, no GI concerns.  Hematologic: Negative, no bruising or bleeding concerns.  Genitourinary: Negative, no bladder concerns.  Musculoskeletal: Fractured his right big toe four years ago. He uses a electronic chair at all times except for when he uses a lift for the bathroom and transfers. He has been in the wheelchair consistently for the last 3.5 years.   Psychiatric: Negative  Neurologic: Negative, no headaches. No seizures.   Skin: Negative, no skin changes. Dry skin.   Endocrine: see HPI. Clothing Sizes: Shoes ***7.5, Shirts: XL, Pants: L. Started having signs and symptoms of puberty about 2.5 years ago. He does not shave his face yet. No voice changes.              Physical Exam:   There were no vitals taken for this visit.  No blood pressure reading on file for this encounter.  Height: 0 cm   No height on file for this encounter.  Weight: Patient weight not available., No weight on file for this encounter.  BMI: There is no height or weight on file to calculate BMI. No height and weight on file for this encounter.   Growth velocity: *** cm/yr (*** percentile)     GENERAL:  He is alert and in no apparent distress. Cushingoid face.   HEENT:  Head is  normocephalic and atraumatic.  Pupils equal, round and reactive to light and accommodation.  Extraocular movements are intact.  Funduscopic exam shows crisp disc margins and normal venous pulsations.  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 His post-transplant complications have included c.difficile (received two courses of PO Vanc) and anorexia (on full TPN/IL).     Isael is clinically well appearing with no complaints. His creatinine has doubled since 3/26. This is likely due to recent discontinuation of TPN and an elevated Tacrolimus level. Canceled his line removal scheduled for today and started him on IV fluid boluses at home. Rescheduled line removal for 4/9. Recheck labs on 4/5.    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) and PLAT-05 (CAR19/22) at [place] (loss of CD22 CAR and rejection of CD19 directed CAR without evidence of disease day +21). Of note, his initial post CAR-T therapy was complicated by Grade III CRS necessitating Tociluzimab x3, dopamine pressor support, and steroids. No CRS, neurotoxicity or infectious complications from [place] CAR-T. He received preparative regimen per MT2013-09 with Thiotepa, Fludarabine, Busulfan, and ATG and received a UCBT on 2/16/21. He engrafted on day +20. His Day +21 peripheral VNTRs (3/9) and bone marrow chimerism (3/11) reveals 100% donor chimerism and his flow, morphology, and NGS is negative. FISH negative for ETV6 and JAK2 and chromosomes pending.     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 Continue Tacrolimus 0.5mg PO BID, goal 5-10. Level was 14.2 on 3/30. Transitioned to suspension, dose reduced. Recheck pending today.     FEN/Renal: s/p TPN/IL 3/26/21. Regular diet as tolerated. Dietician to follow. Weekly screening for aHUS/TA-TMA until day+60. Weekly LDH: 264 (3/29); Weekly urine protein/creatinine: 0.07 (3/29) - no concerns currently. Magnesium 1.9 today- continue Mag Ox BID. Creatinine doubled from 0.64 on 3/26 to 1.27 today. Likely  are clear.  Oropharynx shows normal dentition uvula and palate.  Tympanic membranes visualized and clear.   NECK:  Supple.  Thyroid was palpable, smooth with no nodules. It was not enlarged.    LUNGS:  Clear to auscultation bilaterally.   CARDIOVASCULAR:  Regular rate and rhythm without murmur, gallop or rub.   BREASTS:  Teo I.  Axillary hair, odor and sweat were absent.   ABDOMEN:  Nondistended.  Positive bowel sounds, soft and nontender.  No hepatosplenomegaly or masses palpable.   GENITOURINARY EXAM:  Pubic hair is Teo 3.  Testes 2 ml in volume bilaterally. Phallus Teo 3, circumcised.   MUSCULOSKELETAL: He is wheelchair bound. No evidence of scoliosis. No pain to palpation or percussion of the spine.   NEUROLOGIC:  Cranial nerves II-XII tested and intact.  Deep tendon reflexes 2+ and symmetric.   SKIN:  No evidence of acne or oiliness. No striae.          Laboratory results:   DX HIP/PELVIS/SPINE. 6/27/2019 1:20 PM     INDICATION: Hereditary progressive muscular dystrophy (H)     COMPARISON: 3/23/18     TECHNICAL: The patient was scanned using a GE Lunar Prodigy, with  pediatric software.     Age: 12 years 8 months  Gender: Male  Race/Ethnicity: White  Referring Physician: LOTUS DURAN     FINDINGS:     Image quality: adequate  Height: 61.0 inches   Weight: 152.9 lbs.  Height percentile for age: 5  Height age included if height less than 3rd percentile     Densitometry results:  Spine L2-L4, L1 excluded due to >1 SD compared to other lumbar  vertebrae  Chronological age BMD Z-score: -2.3  Bone Mineral Density: 0.735 gm/cm2  Percent change: 1.2%     Total Body Less Head:  Chronological age BMD Z-score: -3.0  Bone Mineral Density: 0.677 gm/cm2  Percent change: 2.6%     Body Composition:  Lean body mass for height centile: 1%  % body fat: 59.7%                                                                      IMPRESSION:   1. Bone mineral density below the expected range for age with  multifactorial (discontinued TPN, Tacro level elevated). Start daily 500 mL NS bolus at home via FHI. Recheck labs on 4/5. If creatinine improved, consider stopping the boluses, recheck labs on 4/7.      Cardiovascular: History of asymptomatic WPW syndrome (diagnosed 2018): no interventions to date. Most recent EKG (1/27). ECHO (1/26) normal, EF 64%. 24h Zio patch holter (1/27-1/28) revealed c/w WPW with no ventricular ectopies present. Cardiology following. BNP elevated 664 (2/16) likely attributed to fluid shifts per cardiology. BNP threshold of concern is >5000. Follow up ECHO in 6 mos (7/2021). Normotensive.     Heme: Transfuse for hemoglobin < 8 (experienced mild dizziness and malaise when hgb in 7s); platelet < 10K. Hx of transfusion reactions (hives): Premedicate with tylenol and benadryl prior to pRBCs and plts. GCSF prn for ANC < 1000.      Infectious Disease: History of C.Difficile colitis (2/17); treated with PO Vanc (2/17-2/26) and (3/5-3/15). Continue Viral prophylaxis (CMV/HSV sero positive): HD Acyclovir. Weekly CMV neg 3/18. Continue Fungal prophylaxis:  Fluconazole 200 mg daily. Continue PCP ppx: Bactrim BID q Mon/Tues (no hx of adverse reaction).    Immune: No history of hypogammaglobinemia: IgG level 839 (3/17), continue checking q2 weeks and replace if <400 (no hx replacement at [facility]).      GI: Continue Protonix daily. Continue ativan BID PRN. S/p Ursodiol for VOD prophylaxis. Had been on the VOD study, but decided he wanted to discontinue because did not want to be NPO prior to scans.      Neuro: L shoulder pain completely resolved. Of note, history of significant spinal headaches; consider using Candelario needle with lumbar punctures.      Fertility: Sperm collected previously for fertility preservation.    Access: Scheduled to have CVC removed today, however, given acute rise in creatinine, cancelled removal and requested rescheduling to 4/9.     Disposition: RTC Monday 4/5 for exam, labs with  "no  significant change since DXA on 3/23/18.  2. L1 excluded due to >1 SD compared to other lumbar vertebrae, may  represent vertebral compression fracture based upon wedge deformity  seen on lateral spine radiograph on 6/27/19.  3. Elevated percent body fat.  4. Consider repeating DXA no sooner than 12 months unless clinically  indicated.     According to the ISCD October 2007 Position Statements at www.iscd.org   \"the diagnosis of osteoporosis in males and females ages 5 - 19  requires the presence of both a clinically significant fracture  history (one long bone fracture of the lower extremities, vertebral  compression fracture, or 2+ long bone fractures of the upper  extremities) and low bone mineral density. Low bone mineral density is  defined as BMD Z-score less than or equal to - 2.0 adjusted for age,  gender and body size as appropriate.\"  The least significant change (LSC) for AP Spine = 2%  *HAZ BMD Z-score is an adjustment of the BMD Z-score for short stature  (height <3%).  Body Composition: Cutoffs for Body Fatness from Jordonman et al. Arch  Ped Adol Med 2009;163(9):805.     Age, y      Normal       Moderate       Elevated     Boys  <9           <22%           22-26%           >26%  9-11.9     <24%           24-34%           >34%  12-14.9   <23%           23-32%           >32%  >=15       <22%           22-29%           >29%     Girls  <9           <27%           27-34%           >34%  9-11.9     <30%           30-37%           >37%  12-14.9   <32%           32-39%           >39%  >=15       <36%           36-42%           >42%     ANGELITO RAMOS MD    Component      Latest Ref Rng & Units 6/28/2019   Sodium      133 - 143 mmol/L 140   Potassium      3.4 - 5.3 mmol/L 3.8   Chloride      98 - 110 mmol/L 107   Carbon Dioxide      20 - 32 mmol/L 25   Anion Gap      3 - 14 mmol/L 8   Glucose      70 - 99 mg/dL 79   Urea Nitrogen      7 - 21 mg/dL 8   Creatinine      0.39 - 0.73 mg/dL 0.28 (L)   GFR " POLLO.    BENJA Chen  [facility]  Pediatric Blood and Marrow Transplant        I spent a total of 45 minutes with Artemio Nino on the date of encounter doing chart review, history and exam, review of labs/imaging, discussion with the family, documentation and further activities as noted above.       Estimate      >60 mL/min/1.73:m2 GFR not calculated, patient <18 years old.   GFR Estimate If Black      >60 mL/min/1.73:m2 GFR not calculated, patient <18 years old.   Calcium      9.1 - 10.3 mg/dL 8.4 (L)   Bilirubin Total      0.2 - 1.3 mg/dL 1.2   Albumin      3.4 - 5.0 g/dL 3.5   Protein Total      6.8 - 8.8 g/dL 6.5 (L)   Alkaline Phosphatase      130 - 530 U/L 131   ALT      0 - 50 U/L 120 (H)   AST      0 - 35 U/L 104 (H)   Cholesterol      <170 mg/dL 177 (H)   Triglycerides      <90 mg/dL 142 (H)   HDL Cholesterol      >45 mg/dL 43 (L)   LDL Cholesterol Calculated      <110 mg/dL 106   Non HDL Cholesterol      <120 mg/dL 134 (H)   25 OH Vit D2      ug/L <5   25 OH Vit D3      ug/L 29   25 OH Vit D total      20 - 75 ug/L <34   IGF Binding Protein3      3.3 - 10.3 ug/mL 5.4   IGF Binding Protein 3 SD Score       NEG 0.8   FSH      0.5 - 10.7 IU/L 0.8   Lutropin      0.5 - 7.9 IU/L 0.3 (L)   Testosterone Total      0 - 1,200 ng/dL 8   Hemoglobin A1C      0 - 5.6 % 4.9   Thyroglobulin Antibody      <40 IU/mL <20   Thyroid Peroxidase Antibody      <35 IU/mL <10   TSH      0.40 - 4.00 mU/L 1.00   T4 Free      0.76 - 1.46 ng/dL 1.28   Lab Scanned Result       IGF-1 PEDIATRIC-Scanned (A)   Bone Spec Alk Phosphatase      27.8 - 210.9 ug/L 25.8 (L)   Parathyroid Hormone Intact      18 - 80 pg/mL 31   Phosphorus      2.9 - 5.4 mg/dL 4.3   Magnesium      1.6 - 2.3 mg/dL 2.3       6/28/19  IGF-1 to [facility]: 171 ng/dL (187-599)  IGF-1 Z-Score: -2 SDS     No results found for any visits on 10/05/20.        Assessment and Plan:   1. Osteoporosis with vertebral compression fracture  2. Chronic steroid therapy  3. Duchenne muscular dystrophy   4. Hypocalcemia following zoledronate therapy  5. Delayed Puberty    *** Christopher has a history of vertebral compression fractures due to chronic steroid therapy, weakness, and decreased mobility related to Duchenne muscular dystrophy. Christopher had a hypocalcemic episode following  bisphosphonate therapy requiring hospitalization. The frequency of zoledronate therapy is recommended every 6-12 months. Due to his reaction, and no new fractures, I am leaning toward every 12 months. Christopher is due to see Dr. Johnson and have repeat spine films later today. I recommend a repeat DXA at his next visit in 6 months. Christopher should continue calcium and vitamin D supplementation and we will check his levels today.     Boys with Duchenne muscular dystrophy have late pubertal development and may benefit from testosterone therapy with improvement in bone mineral density. We will assess Christopher's pubertal status based on hormone levels to determine if treatment would be necessary.      No orders of the defined types were placed in this encounter.    RTC for follow up evaluation in 4-6 months.     RESULTS INTERPRETATION: The 25-hydroxy vitamin D, a marker of vitamin D stores and a screen for vitamin D deficiency, is normal but not maximized.  The calcium and phosphorus are normal. There is elevation of the AST and ALT.  The LH is early pubertal (<0.3 prepubertal). The FSH (<2) and testosterone (<20) are prepubertal.     Based upon these test results, I recommend increasing the dose of vitamin D to 4000 international unit(s) daily. I recommend that Christopher receive monthly intramuscular injections of 100 mg testosterone enanthate (or cypionate).  This will help induce pubertal progress and improve bone mineral density.  These injections can be given by a nurse at the local clinic or by a trained family member.       Thank you for allowing me to participate in the care of your patient.  Please do not hesitate to call with questions or concerns.    Sincerely,  ***  I personally performed the entire clinical encounter documented in this note.    David Lew MD, PhD  Professor  Pediatric Endocrinology  [facility]  Phone: [phone]  Fax:   [phone]     CC  Patient Care  Team:  Jose Finn as PCP - General (Family Practice)  Tray Cavazos MD as MD (Pediatric Neurology)  Selene Campos MD as MD (Pediatric Cardiology)  Jose Finn (Family Practice)  Melanie Delatorre MD as MD (INTERNAL MEDICINE - ENDOCRINOLOGY, DIABETES & METABOLISM)  Courtney Martinez MD as MD (Pediatric Pulmonology)  Christen Patel, RN as Nurse Coordinator (Pediatric Neurology)     Parents of Christopher Gorman  [address] [place] [address] [address]  [facility] [id number]                  David Lew MD

## 2021-04-02 NOTE — PHARMACY-CONSULT NOTE
Outpatient IV Medication Monitoring Note      Artemio requires the following outpatient IV medication therapy:     1. Normal saline 500 mL IV daily - start today 4/2/21  2. Continue standard line cares      This was discussed with Lisa Workman NP and communicated to [facility].  Artemio will return for labs and reassessment on Monday 4/5/21.      Pharmacy will continue to follow.   Alexa Ramirez, AndreD

## 2021-04-02 NOTE — PROGRESS NOTES
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 No transfusions or infusions needed.    Discharge Plan:   Pt left [facility] Clinic in stable condition for scheduled appointment in IR for line removal.

## 2021-04-02 NOTE — PHARMACY-IMMUNOSUPPRESSION MONITORING
DRUG LEVEL MONITORING NOTE      Tacrolimus Monitoring Note     D:  Current Tacrolimus dose:  tacrolimus 0.4 mg every morning and 0.3 mg every evening     Tacrolimus level:7.6  Goals for therapy = 5-10     A:  Current trough level is within the desired range.  Drug interactions include: fluconazole      P: Continue current dose.  Discussed with Gabriella Paul NP.  Recheck a level on 4/5/21.       Pharmacy will continue to follow.   Alexa Ramirez RPH

## 2021-04-02 NOTE — PATIENT INSTRUCTIONS
Return to [facility] for labs and exam with POLLO on 4/5 to check creatinine. Please hold Tacrolimus prior to visit for blood drug level, and take this medication after level obtained.    Infusion needs: 500 mL IV bolus daily, starting today delivered by \[facility]\""    Patient has PICC, Central line, CVC line, to be drawn off of per lab. Also has single lumen port.    Medication changes: None    Care plan changes: Cancelled CVC line removal today due to elevated creatinine. Started 500 mL fluid boluses at home. Requested new line removal appointment for 4/9.    Contact information  During business hours (7:30am-4:30pm):   To leave a non-urgent voicemail: call triage line [phone]    To call for time-sensitive needs or concerns : call clinic  [phone]    Evenings after 4:30pm, weekends, and holidays:   For any needs or concerns: call for BMT fellow at [phone] in the case of an emergency    Thank you!     Scheduled 4/05/2021 at 9:30am with Candido Han. DAYAN

## 2021-04-05 ENCOUNTER — HOME INFUSION (PRE-WILLOW HOME INFUSION) (OUTPATIENT)
Dept: PHARMACY | Facility: CLINIC | Age: 21
End: 2021-04-05

## 2021-04-05 ENCOUNTER — ONCOLOGY VISIT (OUTPATIENT)
Dept: TRANSPLANT | Facility: CLINIC | Age: 21
End: 2021-04-05
Attending: NURSE PRACTITIONER
Payer: COMMERCIAL

## 2021-04-05 VITALS
WEIGHT: 138.23 LBS | SYSTOLIC BLOOD PRESSURE: 101 MMHG | BODY MASS INDEX: 20.47 KG/M2 | HEART RATE: 96 BPM | TEMPERATURE: 98.7 F | OXYGEN SATURATION: 99 % | HEIGHT: 69 IN | RESPIRATION RATE: 20 BRPM | DIASTOLIC BLOOD PRESSURE: 66 MMHG

## 2021-04-05 DIAGNOSIS — C91.01 ACUTE LYMPHOBLASTIC LEUKEMIA (ALL) IN REMISSION (H): ICD-10-CM

## 2021-04-05 DIAGNOSIS — C91.02 ACUTE LYMPHOBLASTIC LEUKEMIA (ALL) IN RELAPSE (H): ICD-10-CM

## 2021-04-05 LAB
ANION GAP SERPL CALCULATED.3IONS-SCNC: 5 MMOL/L (ref 3–14)
BUN SERPL-MCNC: 18 MG/DL (ref 7–30)
CALCIUM SERPL-MCNC: 9.1 MG/DL (ref 8.5–10.1)
CHLORIDE SERPL-SCNC: 108 MMOL/L (ref 94–109)
CO2 SERPL-SCNC: 25 MMOL/L (ref 20–32)
CREAT SERPL-MCNC: 0.96 MG/DL (ref 0.66–1.25)
GFR SERPL CREATININE-BSD FRML MDRD: >90 ML/MIN/{1.73_M2}
GLUCOSE SERPL-MCNC: 130 MG/DL (ref 70–99)
PHOSPHATE SERPL-MCNC: 4.1 MG/DL (ref 2.5–4.5)
POTASSIUM SERPL-SCNC: 4.1 MMOL/L (ref 3.4–5.3)
SODIUM SERPL-SCNC: 138 MMOL/L (ref 133–144)
TACROLIMUS BLD-MCNC: 5.5 UG/L (ref 5–15)
TME LAST DOSE: NORMAL H

## 2021-04-05 PROCEDURE — 36592 COLLECT BLOOD FROM PICC: CPT | Performed by: NURSE PRACTITIONER

## 2021-04-05 PROCEDURE — 80048 BASIC METABOLIC PNL TOTAL CA: CPT | Performed by: NURSE PRACTITIONER

## 2021-04-05 PROCEDURE — 80197 ASSAY OF TACROLIMUS: CPT | Performed by: NURSE PRACTITIONER

## 2021-04-05 PROCEDURE — G0463 HOSPITAL OUTPT CLINIC VISIT: HCPCS

## 2021-04-05 PROCEDURE — 99215 OFFICE O/P EST HI 40 MIN: CPT | Performed by: PHYSICIAN ASSISTANT

## 2021-04-05 PROCEDURE — 84100 ASSAY OF PHOSPHORUS: CPT | Performed by: NURSE PRACTITIONER

## 2021-04-05 RX ORDER — PANTOPRAZOLE SODIUM 40 MG/1
40 TABLET, DELAYED RELEASE ORAL 2 TIMES DAILY
Qty: 60 TABLET | Refills: 2 | Status: SHIPPED | OUTPATIENT
Start: 2021-04-05 | End: 2021-04-09

## 2021-04-05 ASSESSMENT — PAIN SCALES - GENERAL: PAINLEVEL: NO PAIN (0)

## 2021-04-05 ASSESSMENT — MIFFLIN-ST. JEOR: SCORE: 1620.12

## 2021-04-05 NOTE — PROGRESS NOTES
This is a recent snapshot of the patient's [facility] medical record.  For current drug dose and complete information and questions, call [phone] or In Basket pool, fv home infusion ([id number])  CSN Number:  [id number]

## 2021-04-05 NOTE — PHARMACY-IMMUNOSUPPRESSION MONITORING
Tacrolimus Monitoring Note    D:  Current tacrolimus dose:  0.4 mg in the morning and 0.3 mg in the evening         Tacrolimus level:  5.5 ug/L.  Goals for therapy = 5-10 ug/L    A:  Current trough level is within the desired range.  Drug interactions include: fluconazole.     P:  Continue current dose.  Discussed recommendations with HANNAH Santos.  Recheck trough level Wednesday 4/7 or sooner if clinically indicated.  Pharmacy Team will continue to follow.      Sherice Henson, AndreD, BCPPS

## 2021-04-05 NOTE — NURSING NOTE
"Chief Complaint   Patient presents with     RECHECK     Patient here today for ALL     /66 (BP Location: Right arm, Patient Position: Sitting, Cuff Size: Adult Regular)   Pulse 96   Temp 98.7  F (37.1  C) (Oral)   Resp 20   Ht 1.757 m (5' 9.17\")   Wt 62.7 kg (138 lb 3.7 oz)   SpO2 99%   BMI 20.31 kg/m      No Pain (0)  Data Unavailable    I have reviewed the patients medication and allergy list.    Patient needs refills: yes Valtrex,Bactrim,Protonix    Dressing change needed? No    EKG needed? No    Zhane Frost CMA  April 5, 2021  "

## 2021-04-05 NOTE — PROGRESS NOTES
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 He is fully donor engrafted with no signs of GVHD. He has good count recovery. He presents to [facility] today with his father for labs and follow up care. Isael was last assessed 4/2 and was noted to have a doubling of his creatinine. He was placed on supplemental IV fluids with 500 mls NS IV daily and encouraged to increase his oral fluids. His CVC line removal was cancelled last week. A supratherapeutic tacrolimus level likely also contributed to his elevated creatinine. He is drinking 3 to 4 water  daily. He is afebrile and otherwise clinically well. No URI symptoms, nausea, emesis, diarrhea, pain or GVHD concerns. Requested some medication refills. Due to insurance we are unable to refill his protonix until tomorrow. He will pick it up at his next clinic visit on 4/7.     Review of Systems: Pertinent positives include those mentioned in interval events. A complete review of systems was performed and is otherwise negative.      Medications: See MAR    Physical Exam:  /66 (BP Location: Right arm, Patient Position: Sitting, Cuff Size: Adult Regular)   Pulse 96   Temp 98.7  F (37.1  C) (Oral)   Resp 20   Ht 1.757 m (5' 9.17\")   Wt 62.7 kg (138 lb 3.7 oz)   SpO2 99%   BMI 20.31 kg/m    GEN: Sitting on exam table in NAD. Pleasant and cooperative. Father present.   HEENT: Alopecia, NC/AT, sclera anicteric, PERRL, nares patent, OP clear without lesions or erythema, MMM.   CARD: RRR, no m/r/g, normal S1/S2. Cap refill < 2 seconds  RESP: CTAB, normal WOB, no adventitious lung sounds  ABD: Soft, NT/ND, flat, NABS  EXTREM: MAEE, WWP.  SKIN: Dry, pale, no rashes, lesions, erythema to visible skin.  NEURO: no focal deficits  ACCESS: unaccessed port R chest. DL Galvan L chest    Labs:   Results for orders placed or performed " in visit on 04/05/21   Phosphorus     Status: None   Result Value Ref Range    Phosphorus 4.1 2.5 - 4.5 mg/dL   Basic metabolic panel     Status: Abnormal   Result Value Ref Range    Sodium 138 133 - 144 mmol/L    Potassium 4.1 3.4 - 5.3 mmol/L    Chloride 108 94 - 109 mmol/L    Carbon Dioxide 25 20 - 32 mmol/L    Anion Gap 5 3 - 14 mmol/L    Glucose 130 (H) 70 - 99 mg/dL    Urea Nitrogen 18 7 - 30 mg/dL    Creatinine 0.96 0.66 - 1.25 mg/dL    GFR Estimate >90 >60 mL/min/[1.73_m2]    GFR Estimate If Black >90 >60 mL/min/[1.73_m2]    Calcium 9.1 8.5 - 10.1 mg/dL       Assessment/Plan:  Artemio is a 19 yo male with 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 His post-transplant complications have included c.difficile (received two courses of PO Vanc) and anorexia (on full TPN/IL).     Isael appears well. Creatinine improving. Continue supplemental IV fluids and reassess on 4/7 and encouraged oral fluids. CVC line removal rescheduled for Friday 4/9. Med refills today. He will  protonix at his 4/7 clinic visit.     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 Of note, his initial post CAR-T therapy was complicated by Grade III CRS necessitating Tociluzimab x3, dopamine pressor support, and steroids. No CRS, neurotoxicity or infectious complications from [place] CAR-T. He received preparative regimen per MT2013-09 with Thiotepa, Fludarabine, Busulfan, and ATG and received a UCBT on 2/16/21. He engrafted on day +20.   - His Day +21 peripheral VNTRs (3/9) and bone marrow chimerism (3/11) reveals 100% donor chimerism and his flow, morphology, and NGS is negative. FISH negative for ETV6 and JAK2 and chromosomes  pending.  - CVC line removal planned Friday 4/9.     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 Level was 14.2 on 3/30. Transitioned to suspension, dose reduced.   - Tacrolimus level 5.5 today, low normal after previously elevated. Goal 5 - 10. Isael contacted by phone and instructed to repeat level on Wednesday 4/7.     FEN/Renal: s/p TPN/IL 3/26/21. Regular diet as tolerated. Dietician to follow. Weekly screening for aHUS/TA-TMA until day+60. Weekly LDH: 264 (3/29); Weekly urine protein/creatinine: 0.07 (3/29) - no concerns currently.   - Creatinine improving. Continue supplemental IV fluids (500 mls NS daily boluses) and reassess on 4/7 and encouraged oral fluids.     Cardiovascular: History of asymptomatic WPW syndrome (diagnosed 2018): no interventions to date. Most recent EKG (1/27). ECHO (1/26) normal, EF 64%. 24h Zio patch holter (1/27-1/28) revealed c/w WPW with no ventricular ectopies present. Cardiology following. BNP elevated 664 (2/16) likely attributed to fluid shifts per cardiology. BNP threshold of concern is >5000. Follow up ECHO in 6 mos (7/2021). Normotensive.     Heme: Transfuse for hemoglobin < 8 (experienced mild dizziness and malaise when hgb in 7s); platelet < 10K. Hx of transfusion reactions (hives): Premedicate with tylenol and benadryl prior to pRBCs and plts. GCSF prn for ANC < 1000.      Infectious Disease: History of C.Difficile colitis (2/17); treated with PO Vanc (2/17-2/26) and (3/5-3/15). Continue Viral prophylaxis (CMV/HSV sero positive): HD Acyclovir. Weekly CMV neg 3/18. Continue Fungal prophylaxis:  Fluconazole 200 mg daily. Continue PCP ppx: Bactrim BID q Mon/Tues (no hx of adverse reaction).    Immune: No history of hypogammaglobinemia: IgG level 839 (3/17), continue checking q2 weeks and replace if <400 (no hx replacement at [facility]).      GI: Continue Protonix daily. Continue ativan BID PRN. S/p Ursodiol for VOD prophylaxis. Had been on the VOD study, but decided he  wanted to discontinue because did not want to be NPO prior to scans.      Neuro: L shoulder pain completely resolved. Of note, history of significant spinal headaches; consider using Candelario needle with lumbar punctures.      Fertility: Sperm collected previously for fertility preservation.    Access: Scheduled to have CVC removal on Friday 4/9. If he continues to require supplemental IV fluids consider port access, requested by family.    Disposition: RTC Wednesday 4/7 for exam, labs with POLLO.    Candido Han PA-C  Pediatric Blood and Marrow Transplant Program  [facility]    I spent a total of 45 minutes with Artemio Nino on the date of encounter doing chart review, history and exam, review of labs/imaging, discussion with the family, documentation and further activities as noted above.      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

## 2021-04-05 NOTE — PHARMACY-CONSULT NOTE
Outpatient IV Medication Monitoring    Artemio is on the following outpatient IV medications:    1. Continue normal saline 500 mL IV daily (started 4/2/21)  2. Continue standard line cares      This was discussed with HANNAH Santos and communicated to [facility].  Artemio will return to clinic on Wednesday 4/7.    Pharmacy will continue to follow.   Sherice Henson, AndreD, BCPPS

## 2021-04-06 NOTE — PROGRESS NOTES
This is a recent snapshot of the patient's [facility] medical record.  For current drug dose and complete information and questions, call [phone] or In Basket pool, fv home infusion ([id number])  CSN Number:  [id number]

## 2021-04-07 ENCOUNTER — HOME INFUSION (PRE-WILLOW HOME INFUSION) (OUTPATIENT)
Dept: PHARMACY | Facility: CLINIC | Age: 21
End: 2021-04-07

## 2021-04-07 ENCOUNTER — ONCOLOGY VISIT (OUTPATIENT)
Dept: TRANSPLANT | Facility: CLINIC | Age: 21
End: 2021-04-07
Attending: NURSE PRACTITIONER
Payer: COMMERCIAL

## 2021-04-07 VITALS
HEIGHT: 69 IN | WEIGHT: 139.55 LBS | DIASTOLIC BLOOD PRESSURE: 71 MMHG | HEART RATE: 97 BPM | TEMPERATURE: 98.7 F | OXYGEN SATURATION: 98 % | BODY MASS INDEX: 20.67 KG/M2 | SYSTOLIC BLOOD PRESSURE: 102 MMHG | RESPIRATION RATE: 18 BRPM

## 2021-04-07 DIAGNOSIS — Z94.81 STATUS POST BONE MARROW TRANSPLANT (H): ICD-10-CM

## 2021-04-07 DIAGNOSIS — C91.01 ACUTE LYMPHOBLASTIC LEUKEMIA (ALL) IN REMISSION (H): ICD-10-CM

## 2021-04-07 LAB
ALBUMIN SERPL-MCNC: 3.7 G/DL (ref 3.4–5)
ALP SERPL-CCNC: 56 U/L (ref 40–150)
ALT SERPL W P-5'-P-CCNC: 91 U/L (ref 0–70)
ANION GAP SERPL CALCULATED.3IONS-SCNC: 5 MMOL/L (ref 3–14)
ANISOCYTOSIS BLD QL SMEAR: SLIGHT
AST SERPL W P-5'-P-CCNC: 58 U/L (ref 0–45)
BASOPHILS # BLD AUTO: 0 10E9/L (ref 0–0.2)
BASOPHILS NFR BLD AUTO: 0 %
BILIRUB SERPL-MCNC: 0.3 MG/DL (ref 0.2–1.3)
BUN SERPL-MCNC: 10 MG/DL (ref 7–30)
CALCIUM SERPL-MCNC: 9.1 MG/DL (ref 8.5–10.1)
CHLORIDE SERPL-SCNC: 109 MMOL/L (ref 94–109)
CMV DNA SPEC NAA+PROBE-ACNC: NORMAL [IU]/ML
CMV DNA SPEC NAA+PROBE-LOG#: NORMAL {LOG_IU}/ML
CO2 SERPL-SCNC: 23 MMOL/L (ref 20–32)
CREAT SERPL-MCNC: 1.06 MG/DL (ref 0.66–1.25)
DIFFERENTIAL METHOD BLD: ABNORMAL
EOSINOPHIL # BLD AUTO: 0.2 10E9/L (ref 0–0.7)
EOSINOPHIL NFR BLD AUTO: 1.7 %
ERYTHROCYTE [DISTWIDTH] IN BLOOD BY AUTOMATED COUNT: 19.5 % (ref 10–15)
GFR SERPL CREATININE-BSD FRML MDRD: >90 ML/MIN/{1.73_M2}
GLUCOSE SERPL-MCNC: 145 MG/DL (ref 70–99)
HCT VFR BLD AUTO: 37.1 % (ref 40–53)
HGB BLD-MCNC: 12.6 G/DL (ref 13.3–17.7)
LYMPHOCYTES # BLD AUTO: 0.8 10E9/L (ref 0.8–5.3)
LYMPHOCYTES NFR BLD AUTO: 7.8 %
MAGNESIUM SERPL-MCNC: 1.6 MG/DL (ref 1.6–2.3)
MCH RBC QN AUTO: 31.6 PG (ref 26.5–33)
MCHC RBC AUTO-ENTMCNC: 34 G/DL (ref 31.5–36.5)
MCV RBC AUTO: 93 FL (ref 78–100)
METAMYELOCYTES # BLD: 0.2 10E9/L
METAMYELOCYTES NFR BLD MANUAL: 1.7 %
MICROCYTES BLD QL SMEAR: PRESENT
MONOCYTES # BLD AUTO: 3 10E9/L (ref 0–1.3)
MONOCYTES NFR BLD AUTO: 30.2 %
NEUTROPHILS # BLD AUTO: 5.8 10E9/L (ref 1.6–8.3)
NEUTROPHILS NFR BLD AUTO: 58.6 %
PHOSPHATE SERPL-MCNC: 3.3 MG/DL (ref 2.5–4.5)
PLATELET # BLD AUTO: 96 10E9/L (ref 150–450)
PLATELET # BLD EST: ABNORMAL 10*3/UL
POLYCHROMASIA BLD QL SMEAR: SLIGHT
POTASSIUM SERPL-SCNC: 4.3 MMOL/L (ref 3.4–5.3)
PROT SERPL-MCNC: 6.9 G/DL (ref 6.8–8.8)
RBC # BLD AUTO: 3.99 10E12/L (ref 4.4–5.9)
SODIUM SERPL-SCNC: 137 MMOL/L (ref 133–144)
SPECIMEN SOURCE: NORMAL
STOMATOCYTES BLD QL SMEAR: SLIGHT
TACROLIMUS BLD-MCNC: 5 UG/L (ref 5–15)
TME LAST DOSE: NORMAL H
WBC # BLD AUTO: 9.9 10E9/L (ref 4–11)

## 2021-04-07 PROCEDURE — 85025 COMPLETE CBC W/AUTO DIFF WBC: CPT | Performed by: PHYSICIAN ASSISTANT

## 2021-04-07 PROCEDURE — 99215 OFFICE O/P EST HI 40 MIN: CPT | Performed by: PHYSICIAN ASSISTANT

## 2021-04-07 PROCEDURE — G0463 HOSPITAL OUTPT CLINIC VISIT: HCPCS

## 2021-04-07 PROCEDURE — 80197 ASSAY OF TACROLIMUS: CPT | Performed by: PHYSICIAN ASSISTANT

## 2021-04-07 PROCEDURE — 80053 COMPREHEN METABOLIC PANEL: CPT | Performed by: PHYSICIAN ASSISTANT

## 2021-04-07 PROCEDURE — 83735 ASSAY OF MAGNESIUM: CPT | Performed by: PHYSICIAN ASSISTANT

## 2021-04-07 PROCEDURE — 36415 COLL VENOUS BLD VENIPUNCTURE: CPT | Performed by: PHYSICIAN ASSISTANT

## 2021-04-07 PROCEDURE — 84100 ASSAY OF PHOSPHORUS: CPT | Performed by: PHYSICIAN ASSISTANT

## 2021-04-07 RX ORDER — FLUCONAZOLE 200 MG/1
200 TABLET ORAL DAILY
Qty: 60 TABLET | Refills: 3 | Status: SHIPPED | OUTPATIENT
Start: 2021-04-07 | End: 2021-04-30

## 2021-04-07 ASSESSMENT — PAIN SCALES - GENERAL: PAINLEVEL: NO PAIN (0)

## 2021-04-07 ASSESSMENT — MIFFLIN-ST. JEOR: SCORE: 1622.99

## 2021-04-07 NOTE — NURSING NOTE
"Chief Complaint   Patient presents with     RECHECK     Patient being seen for BMT follow-up       /71 (BP Location: Right arm, Patient Position: Sitting, Cuff Size: Adult Regular)   Pulse 97   Temp 98.7  F (37.1  C) (Oral)   Resp 18   Ht 1.752 m (5' 8.98\")   Wt 63.3 kg (139 lb 8.8 oz)   SpO2 98%   BMI 20.62 kg/m      Byron Schwab LPN  April 7, 2021  "

## 2021-04-07 NOTE — PHARMACY-IMMUNOSUPPRESSION MONITORING
Tacrolimus Monitoring Note    D:  Current tacrolimus dose:  0.4 mg in the morning and 0.3 mg in the evening        Tacrolimus level:  5.0 ug/L.  Goals for therapy = 5-10 ug/L    A:  Current trough level is within the desired range.  Drug interactions include: fluconazole.     P:  Continue current dose.  Discussed recommendations with HANNAH Santos.  Recheck trough level Friday 4/9 or sooner if clinically indicated.  Pharmacy Team will continue to follow.      Sherice Henson, AndreD, BCPPS

## 2021-04-07 NOTE — PHARMACY-CONSULT NOTE
Outpatient IV Medication Monitoring    Artemio is on the following outpatient IV medications:    1. STOP normal saline 500 mL IV daily  2. Continue standard line cares      This was discussed with HANNAH Santos and communicated to [facility].   Artemio will return to clinic on Friday 4/9.    Pharmacy will continue to follow.   Sherice Henson, AndreD, BCPPS

## 2021-04-08 ENCOUNTER — ALLIED HEALTH/NURSE VISIT (OUTPATIENT)
Dept: TRANSPLANT | Facility: CLINIC | Age: 21
End: 2021-04-08
Attending: PHYSICIAN ASSISTANT
Payer: COMMERCIAL

## 2021-04-08 DIAGNOSIS — C91.01 ACUTE LYMPHOBLASTIC LEUKEMIA (ALL) IN REMISSION (H): ICD-10-CM

## 2021-04-08 DIAGNOSIS — Z94.81 STATUS POST BONE MARROW TRANSPLANT (H): ICD-10-CM

## 2021-04-08 PROCEDURE — U0003 INFECTIOUS AGENT DETECTION BY NUCLEIC ACID (DNA OR RNA); SEVERE ACUTE RESPIRATORY SYNDROME CORONAVIRUS 2 (SARS-COV-2) (CORONAVIRUS DISEASE [COVID-19]), AMPLIFIED PROBE TECHNIQUE, MAKING USE OF HIGH THROUGHPUT TECHNOLOGIES AS DESCRIBED BY CMS-2020-01-R: HCPCS | Performed by: PHYSICIAN ASSISTANT

## 2021-04-08 PROCEDURE — U0005 INFEC AGEN DETEC AMPLI PROBE: HCPCS | Performed by: PHYSICIAN ASSISTANT

## 2021-04-08 NOTE — PROGRESS NOTES
This is a recent snapshot of the patient's [facility] medical record.  For current drug dose and complete information and questions, call [phone] or In Basket pool, fv home infusion ([id number])  CSN Number:  [id number]

## 2021-04-09 ENCOUNTER — ANESTHESIA EVENT (OUTPATIENT)
Dept: PEDIATRICS | Facility: CLINIC | Age: 21
End: 2021-04-09
Payer: COMMERCIAL

## 2021-04-09 ENCOUNTER — HOSPITAL ENCOUNTER (OUTPATIENT)
Facility: CLINIC | Age: 21
Discharge: HOME | End: 2021-04-09
Attending: PEDIATRICS | Admitting: PHYSICIAN ASSISTANT
Payer: COMMERCIAL

## 2021-04-09 ENCOUNTER — ANESTHESIA (OUTPATIENT)
Dept: PEDIATRICS | Facility: CLINIC | Age: 21
End: 2021-04-09
Payer: COMMERCIAL

## 2021-04-09 ENCOUNTER — APPOINTMENT (OUTPATIENT)
Dept: LAB | Facility: CLINIC | Age: 21
End: 2021-04-09
Attending: PEDIATRICS
Payer: COMMERCIAL

## 2021-04-09 ENCOUNTER — HOSPITAL ENCOUNTER (OUTPATIENT)
Dept: INTERVENTIONAL RADIOLOGY/VASCULAR | Facility: CLINIC | Age: 21
End: 2021-04-09
Attending: PEDIATRICS
Payer: COMMERCIAL

## 2021-04-09 ENCOUNTER — HOME INFUSION (PRE-WILLOW HOME INFUSION) (OUTPATIENT)
Dept: PHARMACY | Facility: CLINIC | Age: 21
End: 2021-04-09

## 2021-04-09 ENCOUNTER — ONCOLOGY VISIT (OUTPATIENT)
Dept: TRANSPLANT | Facility: CLINIC | Age: 21
End: 2021-04-09
Attending: PEDIATRICS
Payer: COMMERCIAL

## 2021-04-09 VITALS
HEIGHT: 69 IN | TEMPERATURE: 98.2 F | SYSTOLIC BLOOD PRESSURE: 101 MMHG | HEART RATE: 86 BPM | OXYGEN SATURATION: 98 % | WEIGHT: 140.21 LBS | DIASTOLIC BLOOD PRESSURE: 69 MMHG | RESPIRATION RATE: 18 BRPM | BODY MASS INDEX: 20.77 KG/M2

## 2021-04-09 VITALS
WEIGHT: 136.24 LBS | DIASTOLIC BLOOD PRESSURE: 63 MMHG | RESPIRATION RATE: 20 BRPM | SYSTOLIC BLOOD PRESSURE: 93 MMHG | BODY MASS INDEX: 20.13 KG/M2 | HEART RATE: 61 BPM | OXYGEN SATURATION: 98 % | TEMPERATURE: 97.7 F

## 2021-04-09 DIAGNOSIS — C91.01 ACUTE LYMPHOBLASTIC LEUKEMIA (ALL) IN REMISSION (H): ICD-10-CM

## 2021-04-09 DIAGNOSIS — Z94.81 STATUS POST BONE MARROW TRANSPLANT (H): ICD-10-CM

## 2021-04-09 LAB
ALBUMIN SERPL-MCNC: 3.7 G/DL (ref 3.4–5)
ALP SERPL-CCNC: 64 U/L (ref 40–150)
ALT SERPL W P-5'-P-CCNC: 135 U/L (ref 0–70)
ANION GAP SERPL CALCULATED.3IONS-SCNC: 6 MMOL/L (ref 3–14)
ANISOCYTOSIS BLD QL SMEAR: SLIGHT
AST SERPL W P-5'-P-CCNC: 86 U/L (ref 0–45)
BASOPHILS # BLD AUTO: 0 10E9/L (ref 0–0.2)
BASOPHILS NFR BLD AUTO: 0.3 %
BILIRUB SERPL-MCNC: 0.4 MG/DL (ref 0.2–1.3)
BUN SERPL-MCNC: 22 MG/DL (ref 7–30)
CALCIUM SERPL-MCNC: 9.2 MG/DL (ref 8.5–10.1)
CHLORIDE SERPL-SCNC: 108 MMOL/L (ref 94–109)
CO2 SERPL-SCNC: 24 MMOL/L (ref 20–32)
CREAT SERPL-MCNC: 1.04 MG/DL (ref 0.66–1.25)
CREAT UR-MCNC: 119 MG/DL
DACRYOCYTES BLD QL SMEAR: SLIGHT
DIFFERENTIAL METHOD BLD: ABNORMAL
EOSINOPHIL # BLD AUTO: 0 10E9/L (ref 0–0.7)
EOSINOPHIL NFR BLD AUTO: 0.2 %
ERYTHROCYTE [DISTWIDTH] IN BLOOD BY AUTOMATED COUNT: 19.3 % (ref 10–15)
GFR SERPL CREATININE-BSD FRML MDRD: >90 ML/MIN/{1.73_M2}
GLUCOSE SERPL-MCNC: 106 MG/DL (ref 70–99)
HCT VFR BLD AUTO: 38.2 % (ref 40–53)
HGB BLD-MCNC: 12.8 G/DL (ref 13.3–17.7)
IMM GRANULOCYTES # BLD: 0.2 10E9/L (ref 0–0.4)
IMM GRANULOCYTES NFR BLD: 1.6 %
LDH SERPL L TO P-CCNC: 289 U/L (ref 85–227)
LYMPHOCYTES # BLD AUTO: 2.7 10E9/L (ref 0.8–5.3)
LYMPHOCYTES NFR BLD AUTO: 25 %
MAGNESIUM SERPL-MCNC: 1.9 MG/DL (ref 1.6–2.3)
MCH RBC QN AUTO: 31.3 PG (ref 26.5–33)
MCHC RBC AUTO-ENTMCNC: 33.5 G/DL (ref 31.5–36.5)
MCV RBC AUTO: 93 FL (ref 78–100)
MICROCYTES BLD QL SMEAR: PRESENT
MONOCYTES # BLD AUTO: 3.1 10E9/L (ref 0–1.3)
MONOCYTES NFR BLD AUTO: 28.4 %
NEUTROPHILS # BLD AUTO: 4.8 10E9/L (ref 1.6–8.3)
NEUTROPHILS NFR BLD AUTO: 44.5 %
NRBC # BLD AUTO: 0 10*3/UL
NRBC BLD AUTO-RTO: 0 /100
PLATELET # BLD AUTO: 90 10E9/L (ref 150–450)
PLATELET # BLD EST: ABNORMAL 10*3/UL
POIKILOCYTOSIS BLD QL SMEAR: SLIGHT
POTASSIUM SERPL-SCNC: 4.2 MMOL/L (ref 3.4–5.3)
PROT SERPL-MCNC: 6.8 G/DL (ref 6.8–8.8)
PROT UR-MCNC: 0.12 G/L
PROT/CREAT 24H UR: 0.1 G/G CR (ref 0–0.2)
RBC # BLD AUTO: 4.09 10E12/L (ref 4.4–5.9)
RBC INCLUSIONS BLD: SLIGHT
SODIUM SERPL-SCNC: 138 MMOL/L (ref 133–144)
TACROLIMUS BLD-MCNC: 5.3 UG/L (ref 5–15)
TME LAST DOSE: NORMAL H
WBC # BLD AUTO: 10.8 10E9/L (ref 4–11)

## 2021-04-09 PROCEDURE — 36589 REMOVAL TUNNELED CV CATH: CPT | Performed by: PHYSICIAN ASSISTANT

## 2021-04-09 PROCEDURE — 250N000011 HC RX IP 250 OP 636

## 2021-04-09 PROCEDURE — 999N000131 HC STATISTIC POST-PROCEDURE RECOVERY CARE: Performed by: PHYSICIAN ASSISTANT

## 2021-04-09 PROCEDURE — 36591 DRAW BLOOD OFF VENOUS DEVICE: CPT | Performed by: PHYSICIAN ASSISTANT

## 2021-04-09 PROCEDURE — G0463 HOSPITAL OUTPT CLINIC VISIT: HCPCS | Mod: 25 | Performed by: PHYSICIAN ASSISTANT

## 2021-04-09 PROCEDURE — 80197 ASSAY OF TACROLIMUS: CPT | Performed by: PHYSICIAN ASSISTANT

## 2021-04-09 PROCEDURE — 258N000003 HC RX IP 258 OP 636: Performed by: NURSE ANESTHETIST, CERTIFIED REGISTERED

## 2021-04-09 PROCEDURE — 999N000010 HC STATISTIC ANES STAT CODE-CRNA PER MINUTE: Performed by: PHYSICIAN ASSISTANT

## 2021-04-09 PROCEDURE — 250N000011 HC RX IP 250 OP 636: Performed by: NURSE ANESTHETIST, CERTIFIED REGISTERED

## 2021-04-09 PROCEDURE — 370N000017 HC ANESTHESIA TECHNICAL FEE, PER MIN: Performed by: PHYSICIAN ASSISTANT

## 2021-04-09 PROCEDURE — 84156 ASSAY OF PROTEIN URINE: CPT | Performed by: PEDIATRICS

## 2021-04-09 PROCEDURE — 250N000009 HC RX 250: Performed by: PHYSICIAN ASSISTANT

## 2021-04-09 PROCEDURE — 99215 OFFICE O/P EST HI 40 MIN: CPT | Performed by: PEDIATRICS

## 2021-04-09 PROCEDURE — 999N000141 HC STATISTIC PRE-PROCEDURE NURSING ASSESSMENT: Performed by: PHYSICIAN ASSISTANT

## 2021-04-09 PROCEDURE — 83615 LACTATE (LD) (LDH) ENZYME: CPT | Performed by: PHYSICIAN ASSISTANT

## 2021-04-09 PROCEDURE — 83735 ASSAY OF MAGNESIUM: CPT | Performed by: PHYSICIAN ASSISTANT

## 2021-04-09 PROCEDURE — 80053 COMPREHEN METABOLIC PANEL: CPT | Performed by: PHYSICIAN ASSISTANT

## 2021-04-09 PROCEDURE — 85025 COMPLETE CBC W/AUTO DIFF WBC: CPT | Performed by: PHYSICIAN ASSISTANT

## 2021-04-09 PROCEDURE — 250N000013 HC RX MED GY IP 250 OP 250 PS 637

## 2021-04-09 RX ORDER — ONDANSETRON 2 MG/ML
INJECTION INTRAMUSCULAR; INTRAVENOUS PRN
Status: DISCONTINUED | OUTPATIENT
Start: 2021-04-09 | End: 2021-04-09

## 2021-04-09 RX ORDER — ACETAMINOPHEN 325 MG/1
TABLET ORAL
Status: COMPLETED
Start: 2021-04-09 | End: 2021-04-09

## 2021-04-09 RX ORDER — HEPARIN SODIUM (PORCINE) LOCK FLUSH IV SOLN 100 UNIT/ML 100 UNIT/ML
SOLUTION INTRAVENOUS
Status: COMPLETED
Start: 2021-04-09 | End: 2021-04-09

## 2021-04-09 RX ORDER — LIDOCAINE HYDROCHLORIDE 10 MG/ML
INJECTION, SOLUTION EPIDURAL; INFILTRATION; INTRACAUDAL; PERINEURAL
Status: DISCONTINUED
Start: 2021-04-09 | End: 2021-04-09 | Stop reason: HOSPADM

## 2021-04-09 RX ORDER — PANTOPRAZOLE SODIUM 40 MG/1
40 TABLET, DELAYED RELEASE ORAL DAILY
Qty: 60 TABLET | Refills: 2 | Status: SHIPPED | OUTPATIENT
Start: 2021-04-09 | End: 2022-02-08

## 2021-04-09 RX ORDER — ACETAMINOPHEN 325 MG/1
650 TABLET ORAL ONCE
Status: COMPLETED | OUTPATIENT
Start: 2021-04-09 | End: 2021-04-09

## 2021-04-09 RX ORDER — PROPOFOL 10 MG/ML
INJECTION, EMULSION INTRAVENOUS CONTINUOUS PRN
Status: DISCONTINUED | OUTPATIENT
Start: 2021-04-09 | End: 2021-04-09

## 2021-04-09 RX ORDER — PROPOFOL 10 MG/ML
INJECTION, EMULSION INTRAVENOUS PRN
Status: DISCONTINUED | OUTPATIENT
Start: 2021-04-09 | End: 2021-04-09

## 2021-04-09 RX ORDER — FENTANYL CITRATE 50 UG/ML
INJECTION, SOLUTION INTRAMUSCULAR; INTRAVENOUS PRN
Status: DISCONTINUED | OUTPATIENT
Start: 2021-04-09 | End: 2021-04-09

## 2021-04-09 RX ORDER — SODIUM CHLORIDE, SODIUM LACTATE, POTASSIUM CHLORIDE, CALCIUM CHLORIDE 600; 310; 30; 20 MG/100ML; MG/100ML; MG/100ML; MG/100ML
INJECTION, SOLUTION INTRAVENOUS CONTINUOUS PRN
Status: DISCONTINUED | OUTPATIENT
Start: 2021-04-09 | End: 2021-04-09

## 2021-04-09 RX ORDER — HEPARIN SODIUM (PORCINE) LOCK FLUSH IV SOLN 100 UNIT/ML 100 UNIT/ML
5 SOLUTION INTRAVENOUS EVERY 8 HOURS
Status: DISCONTINUED | OUTPATIENT
Start: 2021-04-09 | End: 2021-04-09 | Stop reason: HOSPADM

## 2021-04-09 RX ADMIN — FENTANYL CITRATE 25 MCG: 50 INJECTION, SOLUTION INTRAMUSCULAR; INTRAVENOUS at 08:41

## 2021-04-09 RX ADMIN — HEPARIN 5 ML: 100 SYRINGE at 09:51

## 2021-04-09 RX ADMIN — PROPOFOL 40 MG: 10 INJECTION, EMULSION INTRAVENOUS at 08:40

## 2021-04-09 RX ADMIN — FENTANYL CITRATE 25 MCG: 50 INJECTION, SOLUTION INTRAMUSCULAR; INTRAVENOUS at 08:56

## 2021-04-09 RX ADMIN — SODIUM CHLORIDE, POTASSIUM CHLORIDE, SODIUM LACTATE AND CALCIUM CHLORIDE: 600; 310; 30; 20 INJECTION, SOLUTION INTRAVENOUS at 08:35

## 2021-04-09 RX ADMIN — HEPARIN SODIUM (PORCINE) LOCK FLUSH IV SOLN 100 UNIT/ML 5 ML: 100 SOLUTION at 09:51

## 2021-04-09 RX ADMIN — ONDANSETRON 4 MG: 2 INJECTION INTRAMUSCULAR; INTRAVENOUS at 08:57

## 2021-04-09 RX ADMIN — MIDAZOLAM 2 MG: 1 INJECTION INTRAMUSCULAR; INTRAVENOUS at 08:36

## 2021-04-09 RX ADMIN — ACETAMINOPHEN 650 MG: 325 TABLET ORAL at 09:49

## 2021-04-09 RX ADMIN — PROPOFOL 250 MCG/KG/MIN: 10 INJECTION, EMULSION INTRAVENOUS at 08:40

## 2021-04-09 ASSESSMENT — ENCOUNTER SYMPTOMS
ROS SKIN COMMENTS: NO ACUTE ISSUES
APNEA: 0

## 2021-04-09 ASSESSMENT — MIFFLIN-ST. JEOR: SCORE: 1622.25

## 2021-04-09 ASSESSMENT — PAIN SCALES - GENERAL: PAINLEVEL: NO PAIN (0)

## 2021-04-09 NOTE — LETTER
"  2021      RE: Artemio Nino  [address] [address]       2021    Dr. Manjinder Paniagua  [facility] [place] [address]    Name: Artemio Nino  MRN: [id number]  : 2000    Dear Dr. Paniagua,    We had the pleasure of seeing your patient Artemio Nino in the [facility] for routine post-BMT follow-up. As you know, Isael is a 19 yo male with a history of multiply relapsed ALL now day +52 s/p UCBT following MAC per 2013. He has been seen this week in clinic and has required follow up on his kidney function once he has been off of TPN.  He had his Galvan line removed today and is thrilled.     Isael is doing well at home. He has not had any issues.  He has no cough, runny nose, problems voiding or stooling.  He has no fevers or sick contacts.  He has not had any nausea this week.  He signed up for soccer and has seen friends.    Review of Systems: Pertinent positives include those mentioned in interval events. A complete review of systems was performed and is otherwise negative.      Medications:  Fluconazole 200 mg daily  Protonix 40 mg PO BID  Tacrolimus 0.4 and 0.3  Valtrex 1g PO TID  Ativan PRN    Physical Exam:  /69 (BP Location: Right arm, Patient Position: Sitting, Cuff Size: Adult Regular)   Pulse 86   Temp 98.2  F (36.8  C) (Oral)   Resp 18   Ht 1.746 m (5' 8.74\")   Wt 63.6 kg (140 lb 3.4 oz)   SpO2 98%   BMI 20.86 kg/m     GEN: Sitting in chair, in good spirits. Talkative with examiner, cooperative  HEENT: Allopecia, NC/AT, Pupils equal and reactive, MMM - no sores or actively bleeding gums, nares patent.   CARD: RRR, no m/r/g, normal S1/S2. Port in place on R side of chest - not accessed. Galvan site with bandage over it.  No issues.   RESP: CTAB, normal WOB, no adventitious sounds  ABD: Soft, NT/ND, flat, NABS  EXTREM: MAEE, WWP  SKIN: Dry, no rashes, no lesions  NEURO: Talkative, CNII-XII " grossly intact    Labs:   Results for orders placed or performed in visit on 04/09/21   Protein  random urine with Creat Ratio     Status: None   Result Value Ref Range    Protein Random Urine 0.12 g/L    Protein Total Urine g/gr Creatinine 0.10 0 - 0.2 g/g Cr   Creatinine urine calculation only     Status: None   Result Value Ref Range    Creatinine Urine 119 mg/dL   Results for orders placed or performed during the hospital encounter of 04/09/21   CBC with platelets differential     Status: Abnormal   Result Value Ref Range    WBC 10.8 4.0 - 11.0 10e9/L    RBC Count 4.09 (L) 4.4 - 5.9 10e12/L    Hemoglobin 12.8 (L) 13.3 - 17.7 g/dL    Hematocrit 38.2 (L) 40.0 - 53.0 %    MCV 93 78 - 100 fl    MCH 31.3 26.5 - 33.0 pg    MCHC 33.5 31.5 - 36.5 g/dL    RDW 19.3 (H) 10.0 - 15.0 %    Platelet Count 90 (L) 150 - 450 10e9/L    Diff Method Automated Method     % Neutrophils 44.5 %    % Lymphocytes 25.0 %    % Monocytes 28.4 %    % Eosinophils 0.2 %    % Basophils 0.3 %    % Immature Granulocytes 1.6 %    Nucleated RBCs 0 0 /100    Absolute Neutrophil 4.8 1.6 - 8.3 10e9/L    Absolute Lymphocytes 2.7 0.8 - 5.3 10e9/L    Absolute Monocytes 3.1 (H) 0.0 - 1.3 10e9/L    Absolute Eosinophils 0.0 0.0 - 0.7 10e9/L    Absolute Basophils 0.0 0.0 - 0.2 10e9/L    Abs Immature Granulocytes 0.2 0 - 0.4 10e9/L    Absolute Nucleated RBC 0.0     Anisocytosis Slight     Poikilocytosis Slight     RBC Fragments Slight     Teardrop Cells Slight     Microcytes Present     Platelet Estimate Decreased    Comprehensive metabolic panel     Status: Abnormal   Result Value Ref Range    Sodium 138 133 - 144 mmol/L    Potassium 4.2 3.4 - 5.3 mmol/L    Chloride 108 94 - 109 mmol/L    Carbon Dioxide 24 20 - 32 mmol/L    Anion Gap 6 3 - 14 mmol/L    Glucose 106 (H) 70 - 99 mg/dL    Urea Nitrogen 22 7 - 30 mg/dL    Creatinine 1.04 0.66 - 1.25 mg/dL    GFR Estimate >90 >60 mL/min/[1.73_m2]    GFR Estimate If Black >90 >60 mL/min/[1.73_m2]    Calcium 9.2 8.5  - 10.1 mg/dL    Bilirubin Total 0.4 0.2 - 1.3 mg/dL    Albumin 3.7 3.4 - 5.0 g/dL    Protein Total 6.8 6.8 - 8.8 g/dL    Alkaline Phosphatase 64 40 - 150 U/L     (H) 0 - 70 U/L    AST 86 (H) 0 - 45 U/L   Magnesium     Status: None   Result Value Ref Range    Magnesium 1.9 1.6 - 2.3 mg/dL   Lactate Dehydrogenase     Status: Abnormal   Result Value Ref Range    Lactate Dehydrogenase 289 (H) 85 - 227 U/L   Tacrolimus level     Status: None   Result Value Ref Range    Tacrolimus Last Dose Not Provided     Tacrolimus Level 5.3 5.0 - 15.0 ug/L   Results for orders placed or performed during the hospital encounter of 04/09/21   IR Procedure Note     Status: None    Narrative    Link Rios PA-C     4/9/2021  9:21 AM  [facility]    Procedure: IR Procedure Note    Date/Time: 4/9/2021 9:20 AM  Performed by: Link Rios PA-C  Authorized by: Link Rios PA-C     UNIVERSAL PROTOCOL   Site Marked: NA  Prior Images Obtained and Reviewed:  Yes  Required items: Required blood products, implants, devices and special   equipment available    Patient identity confirmed:  Verbally with patient, arm band, provided   demographic data and hospital-assigned identification number  Patient was reevaluated immediately before administering moderate or deep   sedation or anesthesia  Confirmation Checklist:  Patient's identity using two indicators, relevant   allergies, procedure was appropriate and matched the consent or emergent   situation and correct equipment/implants were available  Time out: Immediately prior to the procedure a time out was called    Universal Protocol: the Joint Commission Universal Protocol was followed    Preparation: Patient was prepped and draped in usual sterile fashion    ESBL (mL):  0.5         ANESTHESIA    Anesthesia: Local infiltration  Local Anesthetic:  Lidocaine 1% without epinephrine  Anesthetic Total (mL):   3      SEDATION    Patient Sedated: Yes    Sedation Type:  Moderate (conscious) sedation  Vital signs: Vital signs monitored during sedation    See dictated procedure note for full details.  Findings: Existing left internal jugular, 9.5 Fr., 27 cm dual lumen   tunneled central venous catheter removed intact and without difficulty.     Specimens: none    Complications: None    Condition: Stable    Plan: Upright for 2 hours, no strenuous activity for 3 days. Follow up per   primary team.    PROCEDURE   Patient Tolerance:  Patient tolerated the procedure well with no immediate   complications     Time of sedation: Per [facility] anesthesia staff.  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 His post-transplant complications have included c.difficile (received two courses of PO Vanc) and anorexia (on full TPN/IL). Isael is doing well and had his line removed today.      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 Of note, his initial post CAR-T therapy was complicated by Grade III CRS necessitating Tociluzimab x3, dopamine pressor support, and steroids. No CRS, neurotoxicity or infectious complications from [place] CAR-T. He received preparative regimen per MT2013-09 with Thiotepa, Fludarabine, Busulfan, and ATG and received a UCBT on 2/16/21. He engrafted on day +20. His Day +21 peripheral VNTRs (3/9) and bone marrow chimerism (3/11) reveals 100% donor chimerism and his flow, morphology, and NGS is negative. FISH negative for ETV6 and JAK2.     Risk for GVHD: MMF discontinued prior to  discharge. Continue Tacrolimus 0.4 mg am and 0.3 mg PM, level 5.3 today. We will recheck on Friday.       FEN/Renal:   Regular diet as tolerated.      Cardiovascular: History of asymptomatic WPW syndrome (diagnosed 2018): no interventions to date. Most recent EKG (1/27). ECHO (1/26) normal, EF 64%. 24h Zio patch holter (1/27-1/28) revealed c/w WPW with no ventricular ectopies present. Cardiology following. BNP elevated 664 (2/16) likely attributed to fluid shifts per cardiology. BNP threshold of concern is >5000. Follow up ECHO in 6 mos (7/2021). Normotensive.     Heme: Transfuse for hemoglobin < 8 (experienced mild dizziness and malaise when hgb in 7s); platelet < 10K. Hx of transfusion reactions (hives): Premedicate with tylenol and benadryl prior to pRBCs and plts. S/p GCSF daily until ANC >/= 2500 x 3 consecutive days completed 3/17. Now prn for ANC < 1000.      Infectious Disease:History of C.Difficile colitis (2/17); treated with PO Vanc (2/17-2/26) and (3/5-3/15). Diarrhea improved. Continue Viral prophylaxis (CMV/HSV sero positive): HD Acyclovir. Weekly CMV neg 3/18. Continue Fungal prophylaxis with fluconazole.  Also on bactrim.     Immune: No history of hypogammaglobinemia: IgG level 839 (3/17), continue checking q2 weeks and replace if <400 (no hx replacement at [facility]). Does not require IVIG.     GI: Continue Protonix BID. No longer requiring TUMs prn. Continue ativan, zofran and benadryl PRN for nausea.     Neuro: No longer utilizing oxycodone. L shoulder pain completely resolved. Of note, history of significant spinal headaches; consider using Candelario needle with lumbar punctures.      Fertility: Sperm collected previously for fertility preservation.    Disposition: RTC Friday for exam, labs        Viky Zavala MD, PhD    Pediatric Blood and Marrow Transplant  [facility]    Total time spent on the following services on the date of the  encounter: 45 minutes  Preparing to see patient, chart review, review of outside records, ordering medications, test, procedures, interpretation of labs, imaging and other tests, counseling and educating the patient/family/caregiver, documenting clinical information in the electronic or other health record, and communicating results to the patient/family/caregiver.                  Viky Zavala MD

## 2021-04-09 NOTE — PROGRESS NOTES
"2021    Dr. Manjinder Paniagua  [address] [place] [address]    Name: Artemio Nino  MRN: [id number]  : 2000    Dear Dr. Paniagua,    We had the pleasure of seeing your patient Artemio Nino in the [facility] for routine post-BMT follow-up. 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 He has been seen this week in clinic and has required follow up on his kidney function once he has been off of TPN.  He had his Galvan line removed today and is thrilled.     Isael is doing well at home. He has not had any issues.  He has no cough, runny nose, problems voiding or stooling.  He has no fevers or sick contacts.  He has not had any nausea this week.  He signed up for soccer and has seen friends.    Review of Systems: Pertinent positives include those mentioned in interval events. A complete review of systems was performed and is otherwise negative.      Medications:  Fluconazole 200 mg daily  Protonix 40 mg PO BID  Tacrolimus 0.4 and 0.3  Valtrex 1g PO TID  Ativan PRN    Physical Exam:  /69 (BP Location: Right arm, Patient Position: Sitting, Cuff Size: Adult Regular)   Pulse 86   Temp 98.2  F (36.8  C) (Oral)   Resp 18   Ht 1.746 m (5' 8.74\")   Wt 63.6 kg (140 lb 3.4 oz)   SpO2 98%   BMI 20.86 kg/m     GEN: Sitting in chair, in good spirits. Talkative with examiner, cooperative  HEENT: Allopecia, NC/AT, Pupils equal and reactive, MMM - no sores or actively bleeding gums, nares patent.   CARD: RRR, no m/r/g, normal S1/S2. Port in place on R side of chest - not accessed. Galvan site with bandage over it.  No issues.   RESP: CTAB, normal WOB, no adventitious sounds  ABD: Soft, NT/ND, flat, NABS  EXTREM: MAEE, WWP  SKIN: Dry, no rashes, no lesions  NEURO: Talkative, CNII-XII grossly intact    Labs:   Results for orders placed or performed in visit on 21 "   Protein  random urine with Creat Ratio     Status: None   Result Value Ref Range    Protein Random Urine 0.12 g/L    Protein Total Urine g/gr Creatinine 0.10 0 - 0.2 g/g Cr   Creatinine urine calculation only     Status: None   Result Value Ref Range    Creatinine Urine 119 mg/dL   Results for orders placed or performed during the hospital encounter of 04/09/21   CBC with platelets differential     Status: Abnormal   Result Value Ref Range    WBC 10.8 4.0 - 11.0 10e9/L    RBC Count 4.09 (L) 4.4 - 5.9 10e12/L    Hemoglobin 12.8 (L) 13.3 - 17.7 g/dL    Hematocrit 38.2 (L) 40.0 - 53.0 %    MCV 93 78 - 100 fl    MCH 31.3 26.5 - 33.0 pg    MCHC 33.5 31.5 - 36.5 g/dL    RDW 19.3 (H) 10.0 - 15.0 %    Platelet Count 90 (L) 150 - 450 10e9/L    Diff Method Automated Method     % Neutrophils 44.5 %    % Lymphocytes 25.0 %    % Monocytes 28.4 %    % Eosinophils 0.2 %    % Basophils 0.3 %    % Immature Granulocytes 1.6 %    Nucleated RBCs 0 0 /100    Absolute Neutrophil 4.8 1.6 - 8.3 10e9/L    Absolute Lymphocytes 2.7 0.8 - 5.3 10e9/L    Absolute Monocytes 3.1 (H) 0.0 - 1.3 10e9/L    Absolute Eosinophils 0.0 0.0 - 0.7 10e9/L    Absolute Basophils 0.0 0.0 - 0.2 10e9/L    Abs Immature Granulocytes 0.2 0 - 0.4 10e9/L    Absolute Nucleated RBC 0.0     Anisocytosis Slight     Poikilocytosis Slight     RBC Fragments Slight     Teardrop Cells Slight     Microcytes Present     Platelet Estimate Decreased    Comprehensive metabolic panel     Status: Abnormal   Result Value Ref Range    Sodium 138 133 - 144 mmol/L    Potassium 4.2 3.4 - 5.3 mmol/L    Chloride 108 94 - 109 mmol/L    Carbon Dioxide 24 20 - 32 mmol/L    Anion Gap 6 3 - 14 mmol/L    Glucose 106 (H) 70 - 99 mg/dL    Urea Nitrogen 22 7 - 30 mg/dL    Creatinine 1.04 0.66 - 1.25 mg/dL    GFR Estimate >90 >60 mL/min/[1.73_m2]    GFR Estimate If Black >90 >60 mL/min/[1.73_m2]    Calcium 9.2 8.5 - 10.1 mg/dL    Bilirubin Total 0.4 0.2 - 1.3 mg/dL    Albumin 3.7 3.4 - 5.0 g/dL     Protein Total 6.8 6.8 - 8.8 g/dL    Alkaline Phosphatase 64 40 - 150 U/L     (H) 0 - 70 U/L    AST 86 (H) 0 - 45 U/L   Magnesium     Status: None   Result Value Ref Range    Magnesium 1.9 1.6 - 2.3 mg/dL   Lactate Dehydrogenase     Status: Abnormal   Result Value Ref Range    Lactate Dehydrogenase 289 (H) 85 - 227 U/L   Tacrolimus level     Status: None   Result Value Ref Range    Tacrolimus Last Dose Not Provided     Tacrolimus Level 5.3 5.0 - 15.0 ug/L   Results for orders placed or performed during the hospital encounter of 04/09/21   IR Procedure Note     Status: None    Narrative    Link Rios PA-C     4/9/2021  9:21 AM  [facility]    Procedure: IR Procedure Note    Date/Time: 4/9/2021 9:20 AM  Performed by: Link Rios PA-C  Authorized by: Link Rios PA-C     UNIVERSAL PROTOCOL   Site Marked: NA  Prior Images Obtained and Reviewed:  Yes  Required items: Required blood products, implants, devices and special   equipment available    Patient identity confirmed:  Verbally with patient, arm band, provided   demographic data and hospital-assigned identification number  Patient was reevaluated immediately before administering moderate or deep   sedation or anesthesia  Confirmation Checklist:  Patient's identity using two indicators, relevant   allergies, procedure was appropriate and matched the consent or emergent   situation and correct equipment/implants were available  Time out: Immediately prior to the procedure a time out was called    Universal Protocol: the Joint Commission Universal Protocol was followed    Preparation: Patient was prepped and draped in usual sterile fashion    ESBL (mL):  0.5         ANESTHESIA    Anesthesia: Local infiltration  Local Anesthetic:  Lidocaine 1% without epinephrine  Anesthetic Total (mL):  3      SEDATION    Patient Sedated: Yes    Sedation Type:  Moderate (conscious) sedation  Vital  signs: Vital signs monitored during sedation    See dictated procedure note for full details.  Findings: Existing left internal jugular, 9.5 Fr., 27 cm dual lumen   tunneled central venous catheter removed intact and without difficulty.     Specimens: none    Complications: None    Condition: Stable    Plan: Upright for 2 hours, no strenuous activity for 3 days. Follow up per   primary team.    PROCEDURE   Patient Tolerance:  Patient tolerated the procedure well with no immediate   complications     Time of sedation: Per WB anesthesia staff.  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 His post-transplant complications have included c.difficile (received two courses of PO Vanc) and anorexia (on full TPN/IL). Isael is doing well and had his line removed today.      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 Of note, his initial post CAR-T therapy was complicated by Grade III CRS necessitating Tociluzimab x3, dopamine pressor support, and steroids. No CRS, neurotoxicity or infectious complications from [place] CAR-T. He received preparative regimen per MT2013-09 with Thiotepa, Fludarabine, Busulfan, and ATG and received a UCBT on 2/16/21. He engrafted on day +20. His Day +21 peripheral VNTRs (3/9) and bone marrow chimerism (3/11) reveals 100% donor chimerism and his flow, morphology, and NGS is negative. FISH negative for ETV6 and JAK2.     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 Continue Tacrolimus 0.4 mg am and 0.3 mg PM, level 5.3 today. We will recheck on  Friday.       FEN/Renal:   Regular diet as tolerated.      Cardiovascular: History of asymptomatic WPW syndrome (diagnosed 2018): no interventions to date. Most recent EKG (1/27). ECHO (1/26) normal, EF 64%. 24h Zio patch holter (1/27-1/28) revealed c/w WPW with no ventricular ectopies present. Cardiology following. BNP elevated 664 (2/16) likely attributed to fluid shifts per cardiology. BNP threshold of concern is >5000. Follow up ECHO in 6 mos (7/2021). Normotensive.     Heme: Transfuse for hemoglobin < 8 (experienced mild dizziness and malaise when hgb in 7s); platelet < 10K. Hx of transfusion reactions (hives): Premedicate with tylenol and benadryl prior to pRBCs and plts. S/p GCSF daily until ANC >/= 2500 x 3 consecutive days completed 3/17. Now prn for ANC < 1000.      Infectious Disease:History of C.Difficile colitis (2/17); treated with PO Vanc (2/17-2/26) and (3/5-3/15). Diarrhea improved. Continue Viral prophylaxis (CMV/HSV sero positive): HD Acyclovir. Weekly CMV neg 3/18. Continue Fungal prophylaxis with fluconazole.  Also on bactrim.     Immune: No history of hypogammaglobinemia: IgG level 839 (3/17), continue checking q2 weeks and replace if <400 (no hx replacement at [facility]). Does not require IVIG.     GI: Continue Protonix BID. No longer requiring TUMs prn. Continue ativan, zofran and benadryl PRN for nausea.     Neuro: No longer utilizing oxycodone. L shoulder pain completely resolved. Of note, history of significant spinal headaches; consider using Candelario needle with lumbar punctures.      Fertility: Sperm collected previously for fertility preservation.    Disposition: RTC Friday for exam, labs        Viky Zavala MD, PhD    Pediatric Blood and Marrow Transplant  [facility]    Total time spent on the following services on the date of the encounter: 45 minutes  Preparing to see patient, chart review, review of outside records,  ordering medications, test, procedures, interpretation of labs, imaging and other tests, counseling and educating the patient/family/caregiver, documenting clinical information in the electronic or other health record, and communicating results to the patient/family/caregiver.

## 2021-04-09 NOTE — ANESTHESIA CARE TRANSFER NOTE
Patient: Artemio Nino    Procedure(s):  REMOVAL, VASCULAR ACCESS CATHETER    Diagnosis: Acute lymphoblastic leukemia (ALL) in remission (H) [C91.01]  Diagnosis Additional Information: No value filed.    Anesthesia Type:   MAC     Note:    Oropharynx: oropharynx clear of all foreign objects and spontaneously breathing  Level of Consciousness: awake  Oxygen Supplementation: nasal cannula  Level of Supplemental Oxygen (L/min / FiO2): 2 L  Independent Airway: airway patency satisfactory and stable  Dentition: dentition unchanged  Vital Signs Stable: post-procedure vital signs reviewed and stable  Report to RN Given: handoff report given  Patient transferred to:  Recovery    Handoff Report: Identifed the Patient, Identified the Reponsible Provider, Reviewed the pertinent medical history, Discussed the surgical course, Reviewed Intra-OP anesthesia mangement and issues during anesthesia, Set expectations for post-procedure period and Allowed opportunity for questions and acknowledgement of understanding      Vitals: (Last set prior to Anesthesia Care Transfer)  CRNA VITALS  4/9/2021 0846 - 4/9/2021 0919      4/9/2021             NIBP:  90/54    NIBP Mean:  65    Ht Rate:  60    SpO2:  100 %    EKG:  Sinus rhythm        Electronically Signed By: Ashley M. Mulvihill, APRN CRNA  April 9, 2021  9:19 AM

## 2021-04-09 NOTE — PATIENT INSTRUCTIONS
Return to [facility] for labs and exam with Dr. Viky Zavala as already scheduled Please hold tacrolimus prior to visit for blood drug level, and take this medication after level obtained.    Infusion needs: none    Patient has port-a-cath line, to be drawn off of per lab.     Medication changes: none     Care plan changes: none    Contact information  During business hours (7:30am-4:30pm):   To leave a non-urgent voicemail: call triage line [phone]    To call for time-sensitive needs or concerns : call clinic  [phone]    Evenings after 4:30pm, weekends, and holidays:   For any needs or concerns: call for BMT fellow at [phone] in the case of an emergency    Thank you!     No further follow up instructions as of 04/12 @ 1:11pm. SADI

## 2021-04-09 NOTE — DISCHARGE INSTRUCTIONS
[facility]  Pediatric Interventional Radiology  Discharge Instructions for Tunneled Central Venous Catheter Removal    Date of Procedure: 4/9/2021      Today you had a Tunneled Central Venous Catheter Removed by Link Rios PA-C      Activity    No strenuous activity for 1 week    No heavy lifting (greater than 10 pounds) for 3 days     No tub bath, hot tub, or swimming until Steri-strips are no longer there (about 7 days)    It is OK to shower.  Cover the dressing with plastic wrap before taking your shower.     Diet    Resume your regular diet    Discomfort     Pain medications as directed    Site Care    Keep the dressing dry and intact.  Keep the wound clean and dry for 3 days.  After 3 days you may remove the dressing and use Band-Aids until the wound is healed.  Do not remove the Steri-strips for at least 7 days.      If there is any oozing or bleeding from the site, apply direct pressure for 5-10 minutes with a gauze pad.  If bleeding continues after 10 minutes, call Pediatric Interventional Radiology.  If bleeding cannot be controlled with direct pressure, call 911.    If sedation was given:    DO NOT drive or operate heavy machinery for 24 hours    DO NOT drink alcoholic beverages for 24 hours    DO NOT make important legal decisions for 24 hours    You must have a responsible adult to drive you home and stay with you for 24 hours    Call your Doctor if:    Bleeding    Swelling in your neck or arm    Fever greater than 100.5 degrees F (oral)    Other signs of infection such as redness, tenderness, or drainage from the wound    If you have questions or concerns about this procedure:  Pediatric Interventional Radiology [phone] Mon-Fri, 7am to 5pm        [phone] After-hours, weekends, holidays  Ask for the Pediatric Interventional Radiologist on-call       Home Instructions for Your Child after Sedation  Today you received (medicine):  Propofol,  Fentanyl, Versed and Zofran  Please keep this form with your health records  You may be more sleepy and irritable today than normal. Also, an adult should stay with you for the rest of the day. The medicine may make you dizzy. Avoid activities that require balance (bike riding, skating, climbing stairs, walking).  Remember:    When you want to eat again, start with liquids (juice, soda pop, Popsicles). If you feel well enough, you may try a regular diet. It is best to start light meals for the first 24 hours.    If you feel nauseated (feels sick to the stomach) or vomiting (throws up), take small amounts of clear liquids (7-Up, Sprite, apple juice or broth). Fluids are more important than food until you are feeling better.    Wait 24 hours before giving medicine that contains alcohol. This includes liquid cold, cough and allergy medicines (Robitussin, Vicks Formula 44 for children, Benadryl, Chlor-Trimeton).  Call your doctor if:    You have questions about the test results.    You vomit (throws up) more than two times.    You have trouble waking your child.     If your child has trouble breathing, call [phone].  If you have any questions or concerns, please call:  Pediatric Sedation Unit [phone]  Pediatric clinic  [phone]  [facility]  [phone]   Emergency department [phone]  [facility] toll-free number [phone] (Monday--Friday, 8 a.m. to 4:30 p.m.)  I understand these instructions. I have all of my personal belongings.

## 2021-04-09 NOTE — ANESTHESIA POSTPROCEDURE EVALUATION
Patient: Artemio Nino    Procedure(s):  REMOVAL, VASCULAR ACCESS CATHETER    Diagnosis:Acute lymphoblastic leukemia (ALL) in remission (H) [C91.01]  Diagnosis Additional Information: No value filed.    Anesthesia Type:  MAC    Note:  Disposition: Outpatient   Postop Pain Control: Uneventful            Sign Out: Well controlled pain   PONV: No   Neuro/Psych: Uneventful            Sign Out: Acceptable/Baseline neuro status   Airway/Respiratory: Uneventful            Sign Out: Acceptable/Baseline resp. status   CV/Hemodynamics: Uneventful            Sign Out: Acceptable CV status   Other NRE: NONE   DID A NON-ROUTINE EVENT OCCUR? No    Event details/Postop Comments:  Awakening satisfactorily; strong; breathing well; oriented; dad here; no complaints or complications; comfortable.          Last vitals:  Vitals:    04/09/21 0723 04/09/21 0915 04/09/21 0930   BP: 111/76     Pulse: 88     Resp: 16 16 18   Temp: 36.7  C (98  F) 36.1  C (97  F) 36.5  C (97.7  F)   SpO2: 98% 98%        Last vitals prior to Anesthesia Care Transfer:  CRNA VITALS  4/9/2021 0846 - 4/9/2021 0943      4/9/2021             NIBP:  90/54    NIBP Mean:  65    Ht Rate:  60    SpO2:  100 %    EKG:  Sinus rhythm          Electronically Signed By: Matthew Strickland MD  April 9, 2021  9:43 AM

## 2021-04-09 NOTE — LETTER
"    2021         RE: Artemio Nino  [address] [facility] [id number]      2021    Dr. Manjinder Paniagua  [facility] [place] [address]    Name: Artemio Nino  MRN: [id number]  : 2000    Dear Dr. Paniagua,    We had the pleasure of seeing your patient Artemio Nino in the [facility] for routine post-BMT follow-up. 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 He has been seen this week in clinic and has required follow up on his kidney function once he has been off of TPN.  He had his Galvan line removed today and is thrilled.     Isael is doing well at home. He has not had any issues.  He has no cough, runny nose, problems voiding or stooling.  He has no fevers or sick contacts.  He has not had any nausea this week.  He signed up for soccer and has seen friends.    Review of Systems: Pertinent positives include those mentioned in interval events. A complete review of systems was performed and is otherwise negative.      Medications:  Fluconazole 200 mg daily  Protonix 40 mg PO BID  Tacrolimus 0.4 and 0.3  Valtrex 1g PO TID  Ativan PRN    Physical Exam:  /69 (BP Location: Right arm, Patient Position: Sitting, Cuff Size: Adult Regular)   Pulse 86   Temp 98.2  F (36.8  C) (Oral)   Resp 18   Ht 1.746 m (5' 8.74\")   Wt 63.6 kg (140 lb 3.4 oz)   SpO2 98%   BMI 20.86 kg/m     GEN: Sitting in chair, in good spirits. Talkative with examiner, cooperative  HEENT: Allopecia, NC/AT, Pupils equal and reactive, MMM - no sores or actively bleeding gums, nares patent.   CARD: RRR, no m/r/g, normal S1/S2. Port in place on R side of chest - not accessed. Galvan site with bandage over it.  No issues.   RESP: CTAB, normal WOB, no adventitious sounds  ABD: Soft, NT/ND, flat, NABS  EXTREM: MAEE, WWP  SKIN: Dry, no rashes, no lesions  NEURO: Talkative, CNII-XII " grossly intact    Labs:   Results for orders placed or performed in visit on 04/09/21   Protein  random urine with Creat Ratio     Status: None   Result Value Ref Range    Protein Random Urine 0.12 g/L    Protein Total Urine g/gr Creatinine 0.10 0 - 0.2 g/g Cr   Creatinine urine calculation only     Status: None   Result Value Ref Range    Creatinine Urine 119 mg/dL   Results for orders placed or performed during the hospital encounter of 04/09/21   CBC with platelets differential     Status: Abnormal   Result Value Ref Range    WBC 10.8 4.0 - 11.0 10e9/L    RBC Count 4.09 (L) 4.4 - 5.9 10e12/L    Hemoglobin 12.8 (L) 13.3 - 17.7 g/dL    Hematocrit 38.2 (L) 40.0 - 53.0 %    MCV 93 78 - 100 fl    MCH 31.3 26.5 - 33.0 pg    MCHC 33.5 31.5 - 36.5 g/dL    RDW 19.3 (H) 10.0 - 15.0 %    Platelet Count 90 (L) 150 - 450 10e9/L    Diff Method Automated Method     % Neutrophils 44.5 %    % Lymphocytes 25.0 %    % Monocytes 28.4 %    % Eosinophils 0.2 %    % Basophils 0.3 %    % Immature Granulocytes 1.6 %    Nucleated RBCs 0 0 /100    Absolute Neutrophil 4.8 1.6 - 8.3 10e9/L    Absolute Lymphocytes 2.7 0.8 - 5.3 10e9/L    Absolute Monocytes 3.1 (H) 0.0 - 1.3 10e9/L    Absolute Eosinophils 0.0 0.0 - 0.7 10e9/L    Absolute Basophils 0.0 0.0 - 0.2 10e9/L    Abs Immature Granulocytes 0.2 0 - 0.4 10e9/L    Absolute Nucleated RBC 0.0     Anisocytosis Slight     Poikilocytosis Slight     RBC Fragments Slight     Teardrop Cells Slight     Microcytes Present     Platelet Estimate Decreased    Comprehensive metabolic panel     Status: Abnormal   Result Value Ref Range    Sodium 138 133 - 144 mmol/L    Potassium 4.2 3.4 - 5.3 mmol/L    Chloride 108 94 - 109 mmol/L    Carbon Dioxide 24 20 - 32 mmol/L    Anion Gap 6 3 - 14 mmol/L    Glucose 106 (H) 70 - 99 mg/dL    Urea Nitrogen 22 7 - 30 mg/dL    Creatinine 1.04 0.66 - 1.25 mg/dL    GFR Estimate >90 >60 mL/min/[1.73_m2]    GFR Estimate If Black >90 >60 mL/min/[1.73_m2]    Calcium 9.2 8.5  - 10.1 mg/dL    Bilirubin Total 0.4 0.2 - 1.3 mg/dL    Albumin 3.7 3.4 - 5.0 g/dL    Protein Total 6.8 6.8 - 8.8 g/dL    Alkaline Phosphatase 64 40 - 150 U/L     (H) 0 - 70 U/L    AST 86 (H) 0 - 45 U/L   Magnesium     Status: None   Result Value Ref Range    Magnesium 1.9 1.6 - 2.3 mg/dL   Lactate Dehydrogenase     Status: Abnormal   Result Value Ref Range    Lactate Dehydrogenase 289 (H) 85 - 227 U/L   Tacrolimus level     Status: None   Result Value Ref Range    Tacrolimus Last Dose Not Provided     Tacrolimus Level 5.3 5.0 - 15.0 ug/L   Results for orders placed or performed during the hospital encounter of 04/09/21   IR Procedure Note     Status: None    Narrative    Link Rios PA-C     4/9/2021  9:21 AM  [facility]    Procedure: IR Procedure Note    Date/Time: 4/9/2021 9:20 AM  Performed by: Link Rios PA-C  Authorized by: Link Rios PA-C     UNIVERSAL PROTOCOL   Site Marked: NA  Prior Images Obtained and Reviewed:  Yes  Required items: Required blood products, implants, devices and special   equipment available    Patient identity confirmed:  Verbally with patient, arm band, provided   demographic data and hospital-assigned identification number  Patient was reevaluated immediately before administering moderate or deep   sedation or anesthesia  Confirmation Checklist:  Patient's identity using two indicators, relevant   allergies, procedure was appropriate and matched the consent or emergent   situation and correct equipment/implants were available  Time out: Immediately prior to the procedure a time out was called    Universal Protocol: the Joint Commission Universal Protocol was followed    Preparation: Patient was prepped and draped in usual sterile fashion    ESBL (mL):  0.5         ANESTHESIA    Anesthesia: Local infiltration  Local Anesthetic:  Lidocaine 1% without epinephrine  Anesthetic Total (mL):   3      SEDATION    Patient Sedated: Yes    Sedation Type:  Moderate (conscious) sedation  Vital signs: Vital signs monitored during sedation    See dictated procedure note for full details.  Findings: Existing left internal jugular, 9.5 Fr., 27 cm dual lumen   tunneled central venous catheter removed intact and without difficulty.     Specimens: none    Complications: None    Condition: Stable    Plan: Upright for 2 hours, no strenuous activity for 3 days. Follow up per   primary team.    PROCEDURE   Patient Tolerance:  Patient tolerated the procedure well with no immediate   complications     Time of sedation: Per [facility] anesthesia staff.  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 His post-transplant complications have included c.difficile (received two courses of PO Vanc) and anorexia (on full TPN/IL). Isael is doing well and had his line removed today.      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 Of note, his initial post CAR-T therapy was complicated by Grade III CRS necessitating Tociluzimab x3, dopamine pressor support, and steroids. No CRS, neurotoxicity or infectious complications from [facility] CAR-T. He received preparative regimen per MT2013-09 with Thiotepa, Fludarabine, Busulfan, and ATG and received a UCBT on 2/16/21. He engrafted on day +20. His Day +21 peripheral VNTRs (3/9) and bone marrow chimerism (3/11) reveals 100% donor chimerism and his flow, morphology, and NGS is negative. FISH negative for ETV6 and JAK2.     Risk for GVHD: MMF discontinued prior to  discharge. Continue Tacrolimus 0.4 mg am and 0.3 mg PM, level 5.3 today. We will recheck on Friday.       FEN/Renal:   Regular diet as tolerated.      Cardiovascular: History of asymptomatic WPW syndrome (diagnosed 2018): no interventions to date. Most recent EKG (1/27). ECHO (1/26) normal, EF 64%. 24h Zio patch holter (1/27-1/28) revealed c/w WPW with no ventricular ectopies present. Cardiology following. BNP elevated 664 (2/16) likely attributed to fluid shifts per cardiology. BNP threshold of concern is >5000. Follow up ECHO in 6 mos (7/2021). Normotensive.     Heme: Transfuse for hemoglobin < 8 (experienced mild dizziness and malaise when hgb in 7s); platelet < 10K. Hx of transfusion reactions (hives): Premedicate with tylenol and benadryl prior to pRBCs and plts. S/p GCSF daily until ANC >/= 2500 x 3 consecutive days completed 3/17. Now prn for ANC < 1000.      Infectious Disease:History of C.Difficile colitis (2/17); treated with PO Vanc (2/17-2/26) and (3/5-3/15). Diarrhea improved. Continue Viral prophylaxis (CMV/HSV sero positive): HD Acyclovir. Weekly CMV neg 3/18. Continue Fungal prophylaxis with fluconazole.  Also on bactrim.     Immune: No history of hypogammaglobinemia: IgG level 839 (3/17), continue checking q2 weeks and replace if <400 (no hx replacement at [facility]). Does not require IVIG.     GI: Continue Protonix BID. No longer requiring TUMs prn. Continue ativan, zofran and benadryl PRN for nausea.     Neuro: No longer utilizing oxycodone. L shoulder pain completely resolved. Of note, history of significant spinal headaches; consider using Candelario needle with lumbar punctures.      Fertility: Sperm collected previously for fertility preservation.    Disposition: RTC Friday for exam, labs        Viky Zavala MD, PhD    Pediatric Blood and Marrow Transplant  [facility]    Total time spent on the following services on the date of the  encounter: 45 minutes  Preparing to see patient, chart review, review of outside records, ordering medications, test, procedures, interpretation of labs, imaging and other tests, counseling and educating the patient/family/caregiver, documenting clinical information in the electronic or other health record, and communicating results to the patient/family/caregiver.                    Viky Zvaala MD

## 2021-04-09 NOTE — ANESTHESIA PREPROCEDURE EVALUATION
Anesthesia Pre-Procedure Evaluation    Patient: Artemio Nino   MRN:     [id number] Gender:   male   Age:    20 year old :      2000        Preoperative Diagnosis: Acute lymphoblastic leukemia (ALL) in remission (H) [C91.01]   Procedure(s):  REMOVAL, VASCULAR ACCESS CATHETER     LABS:  CBC:   Lab Results   Component Value Date    WBC 9.9 2021    WBC 7.8 2021    HGB 12.6 (L) 2021    HGB 12.3 (L) 2021    HCT 37.1 (L) 2021    HCT 38.3 (L) 2021    PLT 96 (L) 2021    PLT 71 (L) 2021     BMP:   Lab Results   Component Value Date     2021     2021    POTASSIUM 4.3 2021    POTASSIUM 4.1 2021    CHLORIDE 109 2021    CHLORIDE 108 2021    CO2 23 2021    CO2 25 2021    BUN 10 2021    BUN 18 2021    CR 1.06 2021    CR 0.96 2021     (H) 2021     (H) 2021     COAGS:   Lab Results   Component Value Date    PTT 52 (H) 2021    INR 1.01 2021    FIBR 578 (H) 2021     POC:   Lab Results   Component Value Date    BGM 98 2020     OTHER:   Lab Results   Component Value Date    ELSA 9.1 2021    PHOS 3.3 2021    MAG 1.6 2021    ALBUMIN 3.7 2021    PROTTOTAL 6.9 2021    ALT 91 (H) 2021    AST 58 (H) 2021     (H) 2020    ALKPHOS 56 2021    BILITOTAL 0.3 2021    TSH 4.20 (H) 2019    T4 0.88 2019    CRP <2.9 2020        Preop Vitals    BP Readings from Last 3 Encounters:   21 102/71   21 101/66   21 97/65    Pulse Readings from Last 3 Encounters:   21 97   21 96   21 93      Resp Readings from Last 3 Encounters:   21 18   21 20   21 18    SpO2 Readings from Last 3 Encounters:   21 98%   21 99%   21 98%      Temp Readings from Last 1 Encounters:   21 37.1  C (98.7  F) (Oral)    Ht Readings from  "Last 1 Encounters:   04/07/21 1.752 m (5' 8.98\")      Wt Readings from Last 1 Encounters:   04/07/21 63.3 kg (139 lb 8.8 oz)    Estimated body mass index is 20.62 kg/m  as calculated from the following:    Height as of 4/7/21: 1.752 m (5' 8.98\").    Weight as of 4/7/21: 63.3 kg (139 lb 8.8 oz).     LDA:  Port A Cath Single 06/10/20 Right Chest wall (Active)   Site Assessment [facility] 03/16/21 1245   Dressing Status clean;dry;intact 02/08/21 1201   Dressing Intervention Transparent 02/08/21 1201   Dressing change due 02/15/21 02/08/21 1201   Line Status Heparin locked 03/15/21 1000   Access Date 03/08/21 03/08/21 1400   Access Attempts 1 03/08/21 1400   Gauge Power noncoring 90 degree bend;20 gauge;3/4 inch 03/08/21 1400   Needle Change Due 04/05/21 03/14/21 0400   Line Necessity Yes, meets criteria 02/09/21 0800   De-Access Date 03/08/21 03/08/21 1400   Date to be Reflushed 04/05/21 03/22/21 1200   Extravasation? No 02/10/21 1600   Number of days: 303       CVC Double Lumen Left Internal jugular Non - valved (open ended);Tunneled (Active)   Site Assessment [facility] 03/22/21 1200   External Cath Length (cm) 1 cm 02/08/21 0000   Dressing Type Chlorhexidine sponge;Transparent;Securing device 03/22/21 1200   Dressing Status clean;dry;intact 03/16/21 1245   Dressing Intervention new dressing;dressing changed 03/22/21 1200   Dressing Change Due 03/29/21 03/22/21 1200   Line Necessity yes, meets criteria 03/16/21 1245   Purple - Status cap changed 03/22/21 1200   Purple - Cap Change Due 03/29/21 03/22/21 1200   Red - Status cap changed;heparin locked;blood return noted 03/22/21 1200   Red - Cap Change Due 03/29/21 03/22/21 1200   Number of days: 60        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)      History of blood transfusion      WPW (Aaron-Parkinson-White syndrome) 03/2018      Past Surgical History:   Procedure Laterality Date     BONE MARROW BIOPSY, BONE SPECIMEN, " NEEDLE/TROCAR Right 11/27/2018    Procedure: bone marrow biopsy;  Surgeon: Jacqueline Fitzgerald NP;  [facility]      BONE MARROW BIOPSY, BONE SPECIMEN, NEEDLE/TROCAR N/A 2/8/2019    Procedure: BIOPSY BONE MARROW;  Surgeon: Lisa Workman NP;  [facility]      BONE MARROW BIOPSY, BONE SPECIMEN, NEEDLE/TROCAR N/A 5/16/2019    Procedure: BIOPSY, BONE MARROW;  Surgeon: Lilia López APRN CNP;  [facility]     BONE MARROW BIOPSY, BONE SPECIMEN, NEEDLE/TROCAR N/A 11/7/2019    Procedure: Bone marrow biopsy;  Surgeon: Jacqueline Shin NP;  [facility]      BONE MARROW BIOPSY, BONE SPECIMEN, NEEDLE/TROCAR Right 6/10/2020    Procedure: BIOPSY, BONE MARROW;  Surgeon: Candido Han PA-C;  [facility]      BONE MARROW BIOPSY, BONE SPECIMEN, NEEDLE/TROCAR N/A 8/24/2020    Procedure: Bone marrow biopsy;  Surgeon: Jacqueline Shin NP;  [facility]      BONE MARROW BIOPSY, BONE SPECIMEN, NEEDLE/TROCAR N/A 9/25/2020    Procedure: BIOPSY, BONE MARROW;  Surgeon: Lilia López APRN CNP;  [facility]      BONE MARROW BIOPSY, BONE SPECIMEN, NEEDLE/TROCAR N/A 3/11/2021    Procedure: BIOPSY BONE MARROW;  Surgeon: Lisa Workman NP;  [facility]      ESOPHAGOSCOPY, GASTROSCOPY, DUODENOSCOPY (EGD), COMBINED N/A 2/22/2019    Procedure: Upper endoscopy with biopsy;  Surgeon: Magdalene Hobson MD;  [facility]      INSERT CATHETER VASCULAR ACCESS N/A 7/20/2020    Procedure: NON -APHERESIS Double lumen tunneled central burns line placement;  Surgeon: Pato Gómez PA-C;  [facility]      INSERT CATHETER VASCULAR ACCESS N/A 2/8/2021    Procedure: apheresis catheter placement;  Surgeon: Pato Gómez PA-C;  [facility]      INSERT CATHETER VASCULAR ACCESS APHERESIS CHILD N/A 6/10/2020    Procedure: Large Bore Double Lumen NON TUNNELED Apheresis Catheter placement;   Surgeon: Link Rios PA-C;  [facility]      INSERT CATHETER VASCULAR ACCESS DOUBLE LUMEN CHILD N/A 10/26/2018    Procedure: double lumen tunneled line placement;  Surgeon: Rex Valente MD;  [facility]      INSERT PORT VASCULAR ACCESS N/A 6/10/2020    Procedure: Port placement;  Surgeon: Link Rios PA-C;  [facility]      IR CHEST PORT PLACEMENT > 5 YRS OF AGE  6/10/2020     IR CVC NON TUNNEL LINE REMOVAL  6/12/2020     IR CVC NON TUNNEL PLACEMENT  6/10/2020     IR CVC TUNNEL PLACEMENT > 5 YRS OF AGE  7/20/2020     IR CVC TUNNEL PLACEMENT > 5 YRS OF AGE  2/8/2021     IR CVC TUNNEL REMOVAL LEFT  2/8/2019     IR CVC TUNNEL REMOVAL LEFT  8/24/2020     IR PORT REMOVAL LEFT  5/16/2019     O CLAVICLE LEFT Left     fracture with surgical repair and plate/screws     ORTHOPEDIC SURGERY      femur     REMOVE CATHETER VASCULAR ACCESS N/A 2/8/2019    Procedure: Tunneled line removal;  Surgeon: Krystal Esparza MD;  [facility]      REMOVE CATHETER VASCULAR ACCESS N/A 8/24/2020    Procedure: tunneled line removal;  Surgeon: Siri Hernandez PA-C;  [facility]      REMOVE PORT VASCULAR ACCESS N/A 5/16/2019    Procedure: REMOVAL, VASCULAR ACCESS PORT;  Surgeon: Link Rios PA-C;  [facility]      Allergies   Allergen Reactions     Blood Transfusion Related (Informational Only) Other (See Comments)     Patient has a history of a clinically significant antibody against RBC antigens.  A delay in compatible RBCs may occur.Stem cell transplant patient.  Give type O RBCs.  Requires Benadryl and tylenol as premed for platelets and RBCs for hx of hives     Voriconazole Photosensitivity     Significant rash - do not rechallenge        Anesthesia Evaluation    ROS/Med Hx    No history of anesthetic complications  (-) malignant hyperthermia and tuberculosis  Comments: Met with Isael who is NPO with a history of multiply  relapsed ALL now day +53 s/p UCBT.  He has done well with prior anesthesia cares.     Cardiovascular Findings   Comments: Stable with WPW syndrome; jan 26, 2021: TTE: Patient after bone marrow transplant. Normal echocardiogram. The left and  right ventricles have normal chamber size, wall thickness, and systolic  function. The calculated biplane left ventricular ejection fraction is 64 %. A  catheter is seen with its tip in the right atrium. No pericardial effusion.  Technically difficult study due to lung artifact.;    EKG on same day: Sinus bradycardia  Aaron-Parkinson-White  Abnormal ECG  When compared with ECG of 12-AUG-2020 11:22,  Aaron-Parkinson-White is again Present    Neuro Findings   Comments: No acute issues    Pulmonary Findings   (-) asthma and apnea    HENT Findings - negative HENT ROS    Skin Findings   Comments: No acute issues      GI/Hepatic/Renal Findings   (-) GERD    Endocrine/Metabolic Findings - negative ROS      Genetic/Syndrome Findings - negative genetics/syndromes ROS    Hematology/Oncology Findings   (+) cancer and hematopoietic stem cell transplant    Additional Notes  Allergies:   -- Blood Transfusion Related (Informational Only) -- Other (See Comments)    --  Patient has a history of a clinically significant             antibody against RBC antigens.  A delay in             compatible RBCs may occur.Stem cell transplant             patient.  Give type O RBCs.Requires Benadryl and             tylenol as premed for platelets and RBCs for hx of             hives   -- Voriconazole -- Photosensitivity    --  Significant rash - do not rechallenge    Medications Prior to Admission:  fluconazole (DIFLUCAN) 200 MG tablet, Take 1 tablet (200 mg) by mouth daily, Disp: 60 tablet, Rfl: 3  LORazepam (ATIVAN) 1 MG tablet, Take 1 tablet (1 mg) by mouth 2 times daily as needed for anxiety, Disp: 60 tablet, Rfl: 0  magnesium oxide (MAG-OX) 400 (241.3 Mg) MG tablet, Take 1 tablet (400 mg) by mouth 2  times daily, Disp: 60 tablet, Rfl: 1  pantoprazole (PROTONIX) 40 MG EC tablet, Take 1 tablet (40 mg) by mouth 2 times daily, Disp: 60 tablet, Rfl: 2  sulfamethoxazole-trimethoprim (BACTRIM) 400-80 MG tablet, Take 2 tablets by mouth Every Mon, Tues two times daily, Disp: 32 tablet, Rfl: 1  tacrolimus (GENERIC EQUIVALENT) 1 mg/mL suspension, Take 0.4 mL by mouth in the morning, and 0.3 mL by mouth in the evening., Disp: 15 mL, Rfl: 3  valACYclovir (VALTREX) 1000 mg tablet, Take 1 tablet (1,000 mg) by mouth 3 times daily, Disp: 90 tablet, Rfl: 1  tacrolimus (GENERIC EQUIVALENT) 1 MG capsule, See Tacrolimus Suspension Rx for current dosing, Disp: 60 capsule, Rfl: 3            PHYSICAL EXAM:   Mental Status/Neuro: A/A/O   Airway: Facies: Feasible  Mallampati: II  Mouth/Opening: Full  TM distance: > 6 cm  Neck ROM: Full   Respiratory: Auscultation: CTAB     Resp. Rate: Normal     Resp. Effort: Normal      CV: Rhythm: Regular  Rate: Age appropriate  Heart: Normal Sounds  Edema: None   Comments: Dentition: no acute issues                     Anesthesia Plan    ASA Status:  3   NPO Status:  NPO Appropriate    Anesthesia Type: MAC (he is nervous and requests anesthesia care).     - Reason for MAC: straight local not clinically adequate              Consents    Anesthesia Plan(s) and associated risks, benefits, and realistic alternatives discussed. Questions answered and patient/representative(s) expressed understanding.     - Discussed with:  Patient, Parent (Mother and/or Father)      - Extended Intubation/Ventilatory Support Discussed: No.      - Patient is DNR/DNI Status: No    Use of blood products discussed: No .     Postoperative Care    Pain management: Oral pain medications.   PONV prophylaxis: Ondansetron (or other 5HT-3)     Comments:    He requests anesthesia. Procedures and risks explained. He understood and consented. Qs answered.          Matthew Strickland MD

## 2021-04-09 NOTE — PROCEDURES
[facility]    Procedure: IR Procedure Note    Date/Time: 4/9/2021 9:20 AM  Performed by: Link Rios PA-C  Authorized by: Link Rios PA-C     UNIVERSAL PROTOCOL   Site Marked: NA  Prior Images Obtained and Reviewed:  Yes  Required items: Required blood products, implants, devices and special equipment available    Patient identity confirmed:  Verbally with patient, arm band, provided demographic data and hospital-assigned identification number  Patient was reevaluated immediately before administering moderate or deep sedation or anesthesia  Confirmation Checklist:  Patient's identity using two indicators, relevant allergies, procedure was appropriate and matched the consent or emergent situation and correct equipment/implants were available  Time out: Immediately prior to the procedure a time out was called    Universal Protocol: the Joint Commission Universal Protocol was followed    Preparation: Patient was prepped and draped in usual sterile fashion    ESBL (mL):  0.5         ANESTHESIA    Anesthesia: Local infiltration  Local Anesthetic:  Lidocaine 1% without epinephrine  Anesthetic Total (mL):  3      SEDATION    Patient Sedated: Yes    Sedation Type:  Moderate (conscious) sedation  Vital signs: Vital signs monitored during sedation    See dictated procedure note for full details.  Findings: Existing left internal jugular, 9.5 Fr., 27 cm dual lumen tunneled central venous catheter removed intact and without difficulty.     Specimens: none    Complications: None    Condition: Stable    Plan: Upright for 2 hours, no strenuous activity for 3 days. Follow up per primary team.    PROCEDURE   Patient Tolerance:  Patient tolerated the procedure well with no immediate complications     Time of sedation: Per [facility] anesthesia staff.  Length of time physician/provider present for 1:1 monitoring during sedation: 0

## 2021-04-09 NOTE — OR NURSING
Pt alert, VSS,  C/o sl discomfort to L chest site , drsg remains dry and intact. Pt ate and drank well.Discharge instructions reviewed with pt and father. Port hep locked and de-accessed. Pt discharged with  Father.

## 2021-04-14 NOTE — PROGRESS NOTES
This is a recent snapshot of the patient's [facility] medical record.  For current drug dose and complete information and questions, call [phone] or In Basket pool, [facility] ([id number])  CSN Number:  [id number]

## 2021-04-16 ENCOUNTER — ONCOLOGY VISIT (OUTPATIENT)
Dept: TRANSPLANT | Facility: CLINIC | Age: 21
End: 2021-04-16
Attending: PEDIATRICS
Payer: COMMERCIAL

## 2021-04-16 ENCOUNTER — INFUSION THERAPY VISIT (OUTPATIENT)
Dept: INFUSION THERAPY | Facility: CLINIC | Age: 21
End: 2021-04-16
Attending: PEDIATRICS
Payer: COMMERCIAL

## 2021-04-16 ENCOUNTER — TELEPHONE (OUTPATIENT)
Dept: ONCOLOGY | Facility: CLINIC | Age: 21
End: 2021-04-16

## 2021-04-16 VITALS
DIASTOLIC BLOOD PRESSURE: 69 MMHG | OXYGEN SATURATION: 98 % | SYSTOLIC BLOOD PRESSURE: 102 MMHG | HEART RATE: 84 BPM | RESPIRATION RATE: 16 BRPM | TEMPERATURE: 98.4 F

## 2021-04-16 DIAGNOSIS — C91.02 ACUTE LYMPHOBLASTIC LEUKEMIA (ALL) IN RELAPSE (H): ICD-10-CM

## 2021-04-16 DIAGNOSIS — C91.01 ACUTE LYMPHOBLASTIC LEUKEMIA (ALL) IN REMISSION (H): Primary | ICD-10-CM

## 2021-04-16 DIAGNOSIS — Z51.81 ENCOUNTER FOR THERAPEUTIC DRUG MONITORING: ICD-10-CM

## 2021-04-16 DIAGNOSIS — C91.02 ACUTE LYMPHOBLASTIC LEUKEMIA (ALL) IN RELAPSE (H): Primary | ICD-10-CM

## 2021-04-16 LAB
ALBUMIN SERPL-MCNC: 3.6 G/DL (ref 3.4–5)
ALP SERPL-CCNC: 72 U/L (ref 40–150)
ALT SERPL W P-5'-P-CCNC: 162 U/L (ref 0–70)
ANION GAP SERPL CALCULATED.3IONS-SCNC: 2 MMOL/L (ref 3–14)
ANISOCYTOSIS BLD QL SMEAR: ABNORMAL
AST SERPL W P-5'-P-CCNC: 100 U/L (ref 0–45)
BASOPHILS # BLD AUTO: 0 10E9/L (ref 0–0.2)
BASOPHILS NFR BLD AUTO: 0 %
BILIRUB SERPL-MCNC: 0.3 MG/DL (ref 0.2–1.3)
BUN SERPL-MCNC: 13 MG/DL (ref 7–30)
CALCIUM SERPL-MCNC: 9.2 MG/DL (ref 8.5–10.1)
CHLORIDE SERPL-SCNC: 110 MMOL/L (ref 94–109)
CMV DNA SPEC NAA+PROBE-ACNC: NORMAL [IU]/ML
CMV DNA SPEC NAA+PROBE-LOG#: NORMAL {LOG_IU}/ML
CO2 SERPL-SCNC: 26 MMOL/L (ref 20–32)
CREAT SERPL-MCNC: 0.97 MG/DL (ref 0.66–1.25)
CREAT UR-MCNC: 203 MG/DL
DACRYOCYTES BLD QL SMEAR: SLIGHT
DIFFERENTIAL METHOD BLD: ABNORMAL
EOSINOPHIL # BLD AUTO: 0.3 10E9/L (ref 0–0.7)
EOSINOPHIL NFR BLD AUTO: 2.7 %
ERYTHROCYTE [DISTWIDTH] IN BLOOD BY AUTOMATED COUNT: 19.1 % (ref 10–15)
GFR SERPL CREATININE-BSD FRML MDRD: >90 ML/MIN/{1.73_M2}
GLUCOSE SERPL-MCNC: 114 MG/DL (ref 70–99)
HCT VFR BLD AUTO: 38.9 % (ref 40–53)
HGB BLD-MCNC: 12.8 G/DL (ref 13.3–17.7)
LDH SERPL L TO P-CCNC: 305 U/L (ref 85–227)
LYMPHOCYTES # BLD AUTO: 2 10E9/L (ref 0.8–5.3)
LYMPHOCYTES NFR BLD AUTO: 21.2 %
MACROCYTES BLD QL SMEAR: PRESENT
MAGNESIUM SERPL-MCNC: 1.7 MG/DL (ref 1.6–2.3)
MCH RBC QN AUTO: 31.6 PG (ref 26.5–33)
MCHC RBC AUTO-ENTMCNC: 32.9 G/DL (ref 31.5–36.5)
MCV RBC AUTO: 96 FL (ref 78–100)
MONOCYTES # BLD AUTO: 2.9 10E9/L (ref 0–1.3)
MONOCYTES NFR BLD AUTO: 30.1 %
NEUTROPHILS # BLD AUTO: 4.4 10E9/L (ref 1.6–8.3)
NEUTROPHILS NFR BLD AUTO: 46 %
NRBC # BLD AUTO: 0.2 10*3/UL
NRBC BLD AUTO-RTO: 2 /100
PHOSPHATE SERPL-MCNC: 3.4 MG/DL (ref 2.5–4.5)
PLATELET # BLD AUTO: 82 10E9/L (ref 150–450)
PLATELET # BLD EST: ABNORMAL 10*3/UL
POIKILOCYTOSIS BLD QL SMEAR: SLIGHT
POTASSIUM SERPL-SCNC: 4.3 MMOL/L (ref 3.4–5.3)
PROT SERPL-MCNC: 6.7 G/DL (ref 6.8–8.8)
PROT UR-MCNC: 0.18 G/L
PROT/CREAT 24H UR: 0.09 G/G CR (ref 0–0.2)
RBC # BLD AUTO: 4.05 10E12/L (ref 4.4–5.9)
SODIUM SERPL-SCNC: 138 MMOL/L (ref 133–144)
SPECIMEN SOURCE: NORMAL
TACROLIMUS BLD-MCNC: 3.9 UG/L (ref 5–15)
TME LAST DOSE: ABNORMAL H
WBC # BLD AUTO: 9.5 10E9/L (ref 4–11)

## 2021-04-16 PROCEDURE — 99215 OFFICE O/P EST HI 40 MIN: CPT | Mod: GC | Performed by: PEDIATRICS

## 2021-04-16 PROCEDURE — 83615 LACTATE (LD) (LDH) ENZYME: CPT | Performed by: PEDIATRICS

## 2021-04-16 PROCEDURE — 84156 ASSAY OF PROTEIN URINE: CPT | Performed by: PEDIATRICS

## 2021-04-16 PROCEDURE — 84100 ASSAY OF PHOSPHORUS: CPT | Performed by: PEDIATRICS

## 2021-04-16 PROCEDURE — 36591 DRAW BLOOD OFF VENOUS DEVICE: CPT

## 2021-04-16 PROCEDURE — G0463 HOSPITAL OUTPT CLINIC VISIT: HCPCS

## 2021-04-16 PROCEDURE — 80053 COMPREHEN METABOLIC PANEL: CPT | Performed by: PEDIATRICS

## 2021-04-16 PROCEDURE — 85025 COMPLETE CBC W/AUTO DIFF WBC: CPT | Performed by: PEDIATRICS

## 2021-04-16 PROCEDURE — 80197 ASSAY OF TACROLIMUS: CPT | Performed by: PEDIATRICS

## 2021-04-16 PROCEDURE — 83735 ASSAY OF MAGNESIUM: CPT | Performed by: PEDIATRICS

## 2021-04-16 RX ORDER — HEPARIN SODIUM (PORCINE) LOCK FLUSH IV SOLN 100 UNIT/ML 100 UNIT/ML
SOLUTION INTRAVENOUS
Status: DISCONTINUED
Start: 2021-04-16 | End: 2021-04-16 | Stop reason: HOSPADM

## 2021-04-16 ASSESSMENT — PAIN SCALES - GENERAL: PAINLEVEL: NO PAIN (0)

## 2021-04-16 NOTE — PATIENT INSTRUCTIONS
Return to [facility] for labs and exam as previously scheduled.     Infusion needs: None    Medication changes: N/A    Care plan changes: Continue weekly Friday visits. After 5/28, weekly visits to be done with Dr. Solomon.    Contact information  During business hours (7:30am-4:30pm):   To leave a non-urgent voicemail: call triage line [phone]    To call for time-sensitive needs or concerns : call clinic  [phone]    Evenings after 4:30pm, weekends, and holidays:   For any needs or concerns: call for BMT fellow at [phone] in the case of an emergency    Thank you!     No further follow up instructions as of 04/19 @ 1:09pm. SADI

## 2021-04-16 NOTE — LETTER
"    2021         RE: Artemio Nino  [address] [facility] [id number]      2021    Dr. Manjinder Paniagua  [facility] [place] [address]    Name: Artemio Nino  MRN: [id number]  : 2000    Dear Dr. Paniagua,    We had the pleasure of seeing your patient Artemio Nino in the [facility] for routine post-BMT follow-up. 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 He presented today for a routine follow-up appointment.      Isael is doing well at home. He has not had any issues. He notes his energy is \"better\" and has been active. He played a game of soccer with his friends two days ago and was able to keep up with his friends, although noted decreased stamina as compared to prior to transplant. His appetite has been good and continues to eat 3 meals a day, sometimes with snacks in between meals. He has not had trouble with nausea, vomiting, diarrhea, or constipation. He has no rashes. He has not had a fever, chills, cough, or runny nose. He has not had any sick contacts.   Review of Systems: Pertinent positives include those mentioned in interval events. A complete review of systems was performed and is otherwise negative.      Medications:  Fluconazole 200 mg daily  Protonix 40 mg PO BID  Tacrolimus 0.4 and 0.3  Valtrex 1g PO TID  Ativan PRN    Physical Exam:  /69 (BP Location: Right arm, Patient Position: Sitting, Cuff Size: Adult Regular)   Pulse 84   Temp 98.4  F (36.9  C) (Oral)   Resp 16   SpO2 98%    GEN: Sitting in chair, in no acute distress.Talkative with examiner, cooperative. Father present.   HEENT: Allopecia, NC/AT, Pupils equal and reactive, MMM - no sores, lesions, or actively bleeding gums, nares patent.    CARD: RRR, no m/r/g, normal S1/S2. Port in place on R side of chest. Galvan site with bandage over it.  No issues.   RESP: " CTAB, normal WOB, no adventitious sounds  ABD: Soft, NT/ND, flat, NABS  EXTREM: MAEE, WWP  SKIN: Dry, no rashes, no lesions  NEURO: Talkative, CNII-XII grossly intact    Labs:   Results for orders placed or performed in visit on 04/16/21   CBC with platelets differential     Status: Abnormal   Result Value Ref Range    WBC 9.5 4.0 - 11.0 10e9/L    RBC Count 4.05 (L) 4.4 - 5.9 10e12/L    Hemoglobin 12.8 (L) 13.3 - 17.7 g/dL    Hematocrit 38.9 (L) 40.0 - 53.0 %    MCV 96 78 - 100 fl    MCH 31.6 26.5 - 33.0 pg    MCHC 32.9 31.5 - 36.5 g/dL    RDW 19.1 (H) 10.0 - 15.0 %    Platelet Count 82 (L) 150 - 450 10e9/L    Diff Method Manual Differential     % Neutrophils 46.0 %    % Lymphocytes 21.2 %    % Monocytes 30.1 %    % Eosinophils 2.7 %    % Basophils 0.0 %    Nucleated RBCs 2 (H) 0 /100    Absolute Neutrophil 4.4 1.6 - 8.3 10e9/L    Absolute Lymphocytes 2.0 0.8 - 5.3 10e9/L    Absolute Monocytes 2.9 (H) 0.0 - 1.3 10e9/L    Absolute Eosinophils 0.3 0.0 - 0.7 10e9/L    Absolute Basophils 0.0 0.0 - 0.2 10e9/L    Absolute Nucleated RBC 0.2     Anisocytosis Moderate     Poikilocytosis Slight     Teardrop Cells Slight     Macrocytes Present     Platelet Estimate Decreased    Phosphorus     Status: None   Result Value Ref Range    Phosphorus 3.4 2.5 - 4.5 mg/dL   Magnesium     Status: None   Result Value Ref Range    Magnesium 1.7 1.6 - 2.3 mg/dL   Comprehensive metabolic panel     Status: Abnormal   Result Value Ref Range    Sodium 138 133 - 144 mmol/L    Potassium 4.3 3.4 - 5.3 mmol/L    Chloride 110 (H) 94 - 109 mmol/L    Carbon Dioxide 26 20 - 32 mmol/L    Anion Gap 2 (L) 3 - 14 mmol/L    Glucose 114 (H) 70 - 99 mg/dL    Urea Nitrogen 13 7 - 30 mg/dL    Creatinine 0.97 0.66 - 1.25 mg/dL    GFR Estimate >90 >60 mL/min/[1.73_m2]    GFR Estimate If Black >90 >60 mL/min/[1.73_m2]    Calcium 9.2 8.5 - 10.1 mg/dL    Bilirubin Total 0.3 0.2 - 1.3 mg/dL    Albumin 3.6 3.4 - 5.0 g/dL    Protein Total 6.7 (L) 6.8 - 8.8 g/dL     Alkaline Phosphatase 72 40 - 150 U/L     (H) 0 - 70 U/L     (H) 0 - 45 U/L   Lactate Dehydrogenase     Status: Abnormal   Result Value Ref Range    Lactate Dehydrogenase 305 (H) 85 - 227 U/L     Assessment/Plan:  Artemio is a 19 yo male with 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 His post-transplant complications have included C.difficile.  Isael is doing well and we will start to wean his tacrolimus.      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 Of note, his initial post CAR-T therapy was complicated by Grade III CRS necessitating Tociluzimab x3, dopamine pressor support, and steroids. No CRS, neurotoxicity or infectious complications from [facility] CAR-T. He received preparative regimen per MT2013-09 with Thiotepa, Fludarabine, Busulfan, and ATG and received a UCBT on 2/16/21. He engrafted on day +20. His Day +21 peripheral VNTRs (3/9) and bone marrow chimerism (3/11) reveals 100% donor chimerism and his flow, morphology, and NGS is negative. FISH negative for ETV6 and JAK2.     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 Continue Tacrolimus 0.4 mg am and 0.3 mg PM, level 3.9 today. We will plan to taper his Tacrolimus based off of his level today despite it being subtherapeutic.        FEN/Renal:   Regular diet as tolerated.      Cardiovascular: History of asymptomatic WPW syndrome (diagnosed 2018): no interventions to date. Most recent EKG (1/27). ECHO (1/26) normal, EF 64%. 24h Zio patch holter (1/27-1/28) revealed c/w WPW with no ventricular ectopies present. Cardiology following.Follow up ECHO in 6 mos (7/2021). Normotensive.     Heme: Transfuse for hemoglobin < 8  (experienced mild dizziness and malaise when hgb in 7s); platelet < 10K. Hx of transfusion reactions (hives): Premedicate with tylenol and benadryl prior to pRBCs and plts. S/p GCSF daily until ANC >/= 2500 x 3 consecutive days completed 3/17. Now prn for ANC < 1000.      Infectious Disease: History of C.Difficile colitis (2/17); treated with PO Vanc (2/17-2/26) and (3/5-3/15). Diarrhea improved. Continue Viral prophylaxis (CMV/HSV sero positive): HD Acyclovir. Weekly CMV neg 3/18. Continue Fungal prophylaxis with fluconazole.  Also on bactrim.     Immune: No history of hypogammaglobinemia: IgG level 839 (3/17), continue checking q2 weeks and replace if <400 (no hx replacement at [facility]). Does not require IVIG.     GI: Continue Protonix BID. No longer requiring TUMs prn. Continue ativan, zofran and benadryl PRN for nausea.     Neuro: No longer utilizing oxycodone. L shoulder pain completely resolved. Of note, history of significant spinal headaches; consider using Candelario needle with lumbar punctures.      Fertility: Sperm collected previously for fertility preservation.    Disposition: RTC 4/23 for exam, labs    Note written by HANNAH Cruz Student    Viky Zavala MD, PhD    Pediatric Blood and Marrow Transplant  [facility]    Provider Disclosure:  I agree with above History, Review of Systems, Physical exam and Plan.  I have reviewed the content of the documentation and have edited it as needed. I have personally performed the services documented here and the documentation accurately represents those services and the decisions I have made.      Total time spent on the following services on the date of the encounter: 45 minutes  Preparing to see patient, chart review, review of outside records, ordering medications, test, procedures, interpretation of labs, imaging and other tests, counseling and educating the patient/family/caregiver,  documenting clinical information in the electronic or other health record, and communicating results to the patient/family/caregiver.                    Viky Zavala MD

## 2021-04-16 NOTE — PROGRESS NOTES
Patient was added onto the infusion schedule for a port acces/lab draw. Port accessed per sterile technique, blood return noted and labs drawn. Port heparin locked and deaccessed.

## 2021-04-16 NOTE — PROGRESS NOTES
"2021    Dr. Manjinder Paniagua  [facility] [place] [address]    Name: Artemio Nino  MRN: [id number]  : 2000    Dear Dr. Paniagua,    We had the pleasure of seeing your patient Artemio Nino in the [facility] for routine post-BMT follow-up. 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 He presented today for a routine follow-up appointment.      Isael is doing well at home. He has not had any issues. He notes his energy is \"better\" and has been active. He played a game of soccer with his friends two days ago and was able to keep up with his friends, although noted decreased stamina as compared to prior to transplant. His appetite has been good and continues to eat 3 meals a day, sometimes with snacks in between meals. He has not had trouble with nausea, vomiting, diarrhea, or constipation. He has no rashes. He has not had a fever, chills, cough, or runny nose. He has not had any sick contacts.   Review of Systems: Pertinent positives include those mentioned in interval events. A complete review of systems was performed and is otherwise negative.      Medications:  Fluconazole 200 mg daily  Protonix 40 mg PO BID  Tacrolimus 0.4 and 0.3  Valtrex 1g PO TID  Ativan PRN    Physical Exam:  /69 (BP Location: Right arm, Patient Position: Sitting, Cuff Size: Adult Regular)   Pulse 84   Temp 98.4  F (36.9  C) (Oral)   Resp 16   SpO2 98%    GEN: Sitting in chair, in no acute distress.Talkative with examiner, cooperative. Father present.   HEENT: Allopecia, NC/AT, Pupils equal and reactive, MMM - no sores, lesions, or actively bleeding gums, nares patent.    CARD: RRR, no m/r/g, normal S1/S2. Port in place on R side of chest. Galvan site with bandage over it.  No issues.   RESP: CTAB, normal WOB, no adventitious sounds  ABD: Soft, NT/ND, flat, NABS  EXTREM: MAEE, " WW  SKIN: Dry, no rashes, no lesions  NEURO: Talkative, CNII-XII grossly intact    Labs:   Results for orders placed or performed in visit on 04/16/21   CBC with platelets differential     Status: Abnormal   Result Value Ref Range    WBC 9.5 4.0 - 11.0 10e9/L    RBC Count 4.05 (L) 4.4 - 5.9 10e12/L    Hemoglobin 12.8 (L) 13.3 - 17.7 g/dL    Hematocrit 38.9 (L) 40.0 - 53.0 %    MCV 96 78 - 100 fl    MCH 31.6 26.5 - 33.0 pg    MCHC 32.9 31.5 - 36.5 g/dL    RDW 19.1 (H) 10.0 - 15.0 %    Platelet Count 82 (L) 150 - 450 10e9/L    Diff Method Manual Differential     % Neutrophils 46.0 %    % Lymphocytes 21.2 %    % Monocytes 30.1 %    % Eosinophils 2.7 %    % Basophils 0.0 %    Nucleated RBCs 2 (H) 0 /100    Absolute Neutrophil 4.4 1.6 - 8.3 10e9/L    Absolute Lymphocytes 2.0 0.8 - 5.3 10e9/L    Absolute Monocytes 2.9 (H) 0.0 - 1.3 10e9/L    Absolute Eosinophils 0.3 0.0 - 0.7 10e9/L    Absolute Basophils 0.0 0.0 - 0.2 10e9/L    Absolute Nucleated RBC 0.2     Anisocytosis Moderate     Poikilocytosis Slight     Teardrop Cells Slight     Macrocytes Present     Platelet Estimate Decreased    Phosphorus     Status: None   Result Value Ref Range    Phosphorus 3.4 2.5 - 4.5 mg/dL   Magnesium     Status: None   Result Value Ref Range    Magnesium 1.7 1.6 - 2.3 mg/dL   Comprehensive metabolic panel     Status: Abnormal   Result Value Ref Range    Sodium 138 133 - 144 mmol/L    Potassium 4.3 3.4 - 5.3 mmol/L    Chloride 110 (H) 94 - 109 mmol/L    Carbon Dioxide 26 20 - 32 mmol/L    Anion Gap 2 (L) 3 - 14 mmol/L    Glucose 114 (H) 70 - 99 mg/dL    Urea Nitrogen 13 7 - 30 mg/dL    Creatinine 0.97 0.66 - 1.25 mg/dL    GFR Estimate >90 >60 mL/min/[1.73_m2]    GFR Estimate If Black >90 >60 mL/min/[1.73_m2]    Calcium 9.2 8.5 - 10.1 mg/dL    Bilirubin Total 0.3 0.2 - 1.3 mg/dL    Albumin 3.6 3.4 - 5.0 g/dL    Protein Total 6.7 (L) 6.8 - 8.8 g/dL    Alkaline Phosphatase 72 40 - 150 U/L     (H) 0 - 70 U/L     (H) 0 - 45 U/L    Lactate Dehydrogenase     Status: Abnormal   Result Value Ref Range    Lactate Dehydrogenase 305 (H) 85 - 227 U/L     Assessment/Plan:  Artemio is a 21 yo male with 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 His post-transplant complications have included C.difficile.  Isael is doing well and we will start to wean his tacrolimus.      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 Of note, his initial post CAR-T therapy was complicated by Grade III CRS necessitating Tociluzimab x3, dopamine pressor support, and steroids. No CRS, neurotoxicity or infectious complications from [place] CAR-T. He received preparative regimen per MT2013-09 with Thiotepa, Fludarabine, Busulfan, and ATG and received a UCBT on 2/16/21. He engrafted on day +20. His Day +21 peripheral VNTRs (3/9) and bone marrow chimerism (3/11) reveals 100% donor chimerism and his flow, morphology, and NGS is negative. FISH negative for ETV6 and JAK2.     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 Continue Tacrolimus 0.4 mg am and 0.3 mg PM, level 3.9 today. We will plan to taper his Tacrolimus based off of his level today despite it being subtherapeutic.        FEN/Renal:   Regular diet as tolerated.      Cardiovascular: History of asymptomatic WPW syndrome (diagnosed 2018): no interventions to date. Most recent EKG (1/27). ECHO (1/26) normal, EF 64%. 24h Zio patch holter (1/27-1/28) revealed c/w WPW with no ventricular ectopies present. Cardiology following.Follow up ECHO in 6 mos (7/2021). Normotensive.     Heme: Transfuse for hemoglobin < 8 (experienced mild dizziness and malaise when hgb in 7s); platelet < 10K. Hx of transfusion  reactions (hives): Premedicate with tylenol and benadryl prior to pRBCs and plts. S/p GCSF daily until ANC >/= 2500 x 3 consecutive days completed 3/17. Now prn for ANC < 1000.      Infectious Disease: History of C.Difficile colitis (2/17); treated with PO Vanc (2/17-2/26) and (3/5-3/15). Diarrhea improved. Continue Viral prophylaxis (CMV/HSV sero positive): HD Acyclovir. Weekly CMV neg 3/18. Continue Fungal prophylaxis with fluconazole.  Also on bactrim.     Immune: No history of hypogammaglobinemia: IgG level 839 (3/17), continue checking q2 weeks and replace if <400 (no hx replacement at [facility]). Does not require IVIG.     GI: Continue Protonix BID. No longer requiring TUMs prn. Continue ativan, zofran and benadryl PRN for nausea.     Neuro: No longer utilizing oxycodone. L shoulder pain completely resolved. Of note, history of significant spinal headaches; consider using Candelario needle with lumbar punctures.      Fertility: Sperm collected previously for fertility preservation.    Disposition: RTC 4/23 for exam, labs    Note written by HANNAH Cruz Student    Viky Zavala MD, PhD    Pediatric Blood and Marrow Transplant  [facility]    Provider Disclosure:  I agree with above History, Review of Systems, Physical exam and Plan.  I have reviewed the content of the documentation and have edited it as needed. I have personally performed the services documented here and the documentation accurately represents those services and the decisions I have made.      Total time spent on the following services on the date of the encounter: 45 minutes  Preparing to see patient, chart review, review of outside records, ordering medications, test, procedures, interpretation of labs, imaging and other tests, counseling and educating the patient/family/caregiver, documenting clinical information in the electronic or other health record, and communicating  results to the patient/family/caregiver.

## 2021-04-16 NOTE — TELEPHONE ENCOUNTER
Re: Tacrolimus TDM    Dose 0.4/0.3 ml  Target  5-10    Level 3.9    Plan: We will start weaning of his tacrolimus starting today.     - Decrease to 0.3 ml BID      Rosa Abdullahi MD  Fellow, Pediatric Hematology/Oncology

## 2021-04-16 NOTE — LETTER
"  2021      RE: Artemio Nino  [address] [facility] [address]       2021    Dr. Manjinder Paniagua  [facility] [place] [address]    Name: Artemio Nino  MRN: [id number]  : 2000    Dear Dr. Paniagua,    We had the pleasure of seeing your patient Artemio Nino in the [facility] for routine post-BMT follow-up. 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 He presented today for a routine follow-up appointment.      Isael is doing well at home. He has not had any issues. He notes his energy is \"better\" and has been active. He played a game of soccer with his friends two days ago and was able to keep up with his friends, although noted decreased stamina as compared to prior to transplant. His appetite has been good and continues to eat 3 meals a day, sometimes with snacks in between meals. He has not had trouble with nausea, vomiting, diarrhea, or constipation. He has no rashes. He has not had a fever, chills, cough, or runny nose. He has not had any sick contacts.   Review of Systems: Pertinent positives include those mentioned in interval events. A complete review of systems was performed and is otherwise negative.      Medications:  Fluconazole 200 mg daily  Protonix 40 mg PO BID  Tacrolimus 0.4 and 0.3  Valtrex 1g PO TID  Ativan PRN    Physical Exam:  /69 (BP Location: Right arm, Patient Position: Sitting, Cuff Size: Adult Regular)   Pulse 84   Temp 98.4  F (36.9  C) (Oral)   Resp 16   SpO2 98%    GEN: Sitting in chair, in no acute distress.Talkative with examiner, cooperative. Father present.   HEENT: Allopecia, NC/AT, Pupils equal and reactive, MMM - no sores, lesions, or actively bleeding gums, nares patent.    CARD: RRR, no m/r/g, normal S1/S2. Port in place on R side of chest. Galvan site with bandage over it.  No issues.   RESP: CTAB, " normal WOB, no adventitious sounds  ABD: Soft, NT/ND, flat, NABS  EXTREM: MAEE, WWP  SKIN: Dry, no rashes, no lesions  NEURO: Talkative, CNII-XII grossly intact    Labs:   Results for orders placed or performed in visit on 04/16/21   CBC with platelets differential     Status: Abnormal   Result Value Ref Range    WBC 9.5 4.0 - 11.0 10e9/L    RBC Count 4.05 (L) 4.4 - 5.9 10e12/L    Hemoglobin 12.8 (L) 13.3 - 17.7 g/dL    Hematocrit 38.9 (L) 40.0 - 53.0 %    MCV 96 78 - 100 fl    MCH 31.6 26.5 - 33.0 pg    MCHC 32.9 31.5 - 36.5 g/dL    RDW 19.1 (H) 10.0 - 15.0 %    Platelet Count 82 (L) 150 - 450 10e9/L    Diff Method Manual Differential     % Neutrophils 46.0 %    % Lymphocytes 21.2 %    % Monocytes 30.1 %    % Eosinophils 2.7 %    % Basophils 0.0 %    Nucleated RBCs 2 (H) 0 /100    Absolute Neutrophil 4.4 1.6 - 8.3 10e9/L    Absolute Lymphocytes 2.0 0.8 - 5.3 10e9/L    Absolute Monocytes 2.9 (H) 0.0 - 1.3 10e9/L    Absolute Eosinophils 0.3 0.0 - 0.7 10e9/L    Absolute Basophils 0.0 0.0 - 0.2 10e9/L    Absolute Nucleated RBC 0.2     Anisocytosis Moderate     Poikilocytosis Slight     Teardrop Cells Slight     Macrocytes Present     Platelet Estimate Decreased    Phosphorus     Status: None   Result Value Ref Range    Phosphorus 3.4 2.5 - 4.5 mg/dL   Magnesium     Status: None   Result Value Ref Range    Magnesium 1.7 1.6 - 2.3 mg/dL   Comprehensive metabolic panel     Status: Abnormal   Result Value Ref Range    Sodium 138 133 - 144 mmol/L    Potassium 4.3 3.4 - 5.3 mmol/L    Chloride 110 (H) 94 - 109 mmol/L    Carbon Dioxide 26 20 - 32 mmol/L    Anion Gap 2 (L) 3 - 14 mmol/L    Glucose 114 (H) 70 - 99 mg/dL    Urea Nitrogen 13 7 - 30 mg/dL    Creatinine 0.97 0.66 - 1.25 mg/dL    GFR Estimate >90 >60 mL/min/[1.73_m2]    GFR Estimate If Black >90 >60 mL/min/[1.73_m2]    Calcium 9.2 8.5 - 10.1 mg/dL    Bilirubin Total 0.3 0.2 - 1.3 mg/dL    Albumin 3.6 3.4 - 5.0 g/dL    Protein Total 6.7 (L) 6.8 - 8.8 g/dL    Alkaline  Phosphatase 72 40 - 150 U/L     (H) 0 - 70 U/L     (H) 0 - 45 U/L   Lactate Dehydrogenase     Status: Abnormal   Result Value Ref Range    Lactate Dehydrogenase 305 (H) 85 - 227 U/L     Assessment/Plan:  Artemio is a 19 yo male with 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 His post-transplant complications have included C.difficile.  Isael is doing well and we will start to wean his tacrolimus.      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 Of note, his initial post CAR-T therapy was complicated by Grade III CRS necessitating Tociluzimab x3, dopamine pressor support, and steroids. No CRS, neurotoxicity or infectious complications from [place] CAR-T. He received preparative regimen per MT2013-09 with Thiotepa, Fludarabine, Busulfan, and ATG and received a UCBT on 2/16/21. He engrafted on day +20. His Day +21 peripheral VNTRs (3/9) and bone marrow chimerism (3/11) reveals 100% donor chimerism and his flow, morphology, and NGS is negative. FISH negative for ETV6 and JAK2.     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 Continue Tacrolimus 0.4 mg am and 0.3 mg PM, level 3.9 today. We will plan to taper his Tacrolimus based off of his level today despite it being subtherapeutic.        FEN/Renal:   Regular diet as tolerated.      Cardiovascular: History of asymptomatic WPW syndrome (diagnosed 2018): no interventions to date. Most recent EKG (1/27). ECHO (1/26) normal, EF 64%. 24h Zio patch holter (1/27-1/28) revealed c/w WPW with no ventricular ectopies present. Cardiology following.Follow up ECHO in 6 mos (7/2021). Normotensive.     Heme: Transfuse for hemoglobin < 8 (experienced mild  dizziness and malaise when hgb in 7s); platelet < 10K. Hx of transfusion reactions (hives): Premedicate with tylenol and benadryl prior to pRBCs and plts. S/p GCSF daily until ANC >/= 2500 x 3 consecutive days completed 3/17. Now prn for ANC < 1000.      Infectious Disease: History of C.Difficile colitis (2/17); treated with PO Vanc (2/17-2/26) and (3/5-3/15). Diarrhea improved. Continue Viral prophylaxis (CMV/HSV sero positive): HD Acyclovir. Weekly CMV neg 3/18. Continue Fungal prophylaxis with fluconazole.  Also on bactrim.     Immune: No history of hypogammaglobinemia: IgG level 839 (3/17), continue checking q2 weeks and replace if <400 (no hx replacement at [facility]). Does not require IVIG.     GI: Continue Protonix BID. No longer requiring TUMs prn. Continue ativan, zofran and benadryl PRN for nausea.     Neuro: No longer utilizing oxycodone. L shoulder pain completely resolved. Of note, history of significant spinal headaches; consider using Candelario needle with lumbar punctures.      Fertility: Sperm collected previously for fertility preservation.    Disposition: RTC 4/23 for exam, labs    Note written by HANNAH Cruz Student    Viky Zavala MD, PhD    Pediatric Blood and Marrow Transplant  [facility]    Provider Disclosure:  I agree with above History, Review of Systems, Physical exam and Plan.  I have reviewed the content of the documentation and have edited it as needed. I have personally performed the services documented here and the documentation accurately represents those services and the decisions I have made.      Total time spent on the following services on the date of the encounter: 45 minutes  Preparing to see patient, chart review, review of outside records, ordering medications, test, procedures, interpretation of labs, imaging and other tests, counseling and educating the patient/family/caregiver, documenting clinical  information in the electronic or other health record, and communicating results to the patient/family/caregiver.                  Viky Zavala MD

## 2021-04-16 NOTE — PHARMACY-CONSULT NOTE
Tacrolimus Taper Plan:    BMT Team requested a tacrolimus taper over 8-10 weeks.    Current Dose: 0.4 mg in the AM and 0.3 mg in the PM     Recommend the following taper plan:

## 2021-04-16 NOTE — NURSING NOTE
Chief Complaint   Patient presents with     RECHECK     Patient here today for Anniversary Visit     /69 (BP Location: Right arm, Patient Position: Sitting, Cuff Size: Adult Regular)   Pulse 84   Temp 98.4  F (36.9  C) (Oral)   Resp 16   SpO2 98%     No Pain (0)  Data Unavailable    I have reviewed the patients medication and allergy list.    Patient needs refills: no    Dressing change needed? No    EKG needed? No    Zhane Frost CMA  April 16, 2021

## 2021-04-20 ENCOUNTER — ONCOLOGY VISIT (OUTPATIENT)
Dept: TRANSPLANT | Facility: CLINIC | Age: 21
End: 2021-04-20
Attending: NURSE PRACTITIONER
Payer: COMMERCIAL

## 2021-04-20 VITALS
SYSTOLIC BLOOD PRESSURE: 106 MMHG | HEIGHT: 69 IN | TEMPERATURE: 99 F | BODY MASS INDEX: 20.38 KG/M2 | HEART RATE: 99 BPM | RESPIRATION RATE: 16 BRPM | DIASTOLIC BLOOD PRESSURE: 71 MMHG | OXYGEN SATURATION: 97 % | WEIGHT: 137.57 LBS

## 2021-04-20 DIAGNOSIS — H66.001 NON-RECURRENT ACUTE SUPPURATIVE OTITIS MEDIA OF RIGHT EAR WITHOUT SPONTANEOUS RUPTURE OF TYMPANIC MEMBRANE: Primary | ICD-10-CM

## 2021-04-20 PROCEDURE — G0463 HOSPITAL OUTPT CLINIC VISIT: HCPCS

## 2021-04-20 PROCEDURE — 99214 OFFICE O/P EST MOD 30 MIN: CPT | Performed by: NURSE PRACTITIONER

## 2021-04-20 RX ORDER — AMOXICILLIN AND CLAVULANATE POTASSIUM 562.5; 437.5; 62.5 MG/1; MG/1; MG/1
1 TABLET, MULTILAYER, EXTENDED RELEASE ORAL 2 TIMES DAILY
Qty: 20 TABLET | Refills: 0 | Status: SHIPPED | OUTPATIENT
Start: 2021-04-20 | End: 2021-04-20

## 2021-04-20 ASSESSMENT — PAIN SCALES - GENERAL: PAINLEVEL: MILD PAIN (3)

## 2021-04-20 ASSESSMENT — MIFFLIN-ST. JEOR: SCORE: 1613.37

## 2021-04-20 NOTE — PROGRESS NOTES
Pediatric BMT Outpatient Progress Note  April 20, 2021    Isael is a 21 yo male with a history of multiply relapsed ALL now day +63 s/p UCBT following MAC per 2013-09. He presented today with complaints of right ear pain and fullness.     Isael reports that he initially experienced right ear pain while playing soccer over the weekend. He had a mild headache and then a sensation of fullness/aching of his right ear for the past 3 days. He reports that this is a similar sensation as when he has had a past ear infection. Otherwise, Isael notes that he has been feeling well. Denies recent fever, rash, rhinorrhea, nasal congestion/pressure, cough, sore throat, drainage or loss of hearing in either ear. He continues to eat well with no nausea, vomiting, or diarrhea. He is sleeping well and has good energy throughout the day. Denies recent sick contacts.     Review of Systems: Pertinent positives include those mentioned in interval events. A complete review of systems was performed and is otherwise negative.      Medications:  Fluconazole 200 mg daily  Protonix 40 mg PO BID  Tacrolimus 0.4 and 0.3  Valtrex 1g PO TID  Ativan PRN    Physical Exam:  Vital Signs for Peds 4/20/2021   SYSTOLIC 106   DIASTOLIC 71   PULSE 99   TEMPERATURE 99   RESPIRATIONS 16   WEIGHT (kg) 62.4 kg   HEIGHT (cm) 175.1 cm   BMI 20.35   pain    O2 97     GEN: Sitting in chair, in no acute distress.Talkative with examiner, cooperative. Father present.   HEENT: Alopecia, NC/AT, PERRL, right TM bulging with purulent fluid present on the lower half, mild erythema. Left TM pearly grey and translucent. OP clear without lesions or erythema. MMM.   CARD: RRR, no m/r/g, normal S1/S2. Port in place on R side of chest. Galvan site with bandage over it.  No issues.   RESP: CTAB, normal WOB, no adventitious sounds  ABD: Soft, NT/ND, flat, NABS  EXTREM: MAEE, WWP  SKIN: Dry, no rashes, no lesions  NEURO: Talkative, no focal deficits.    Labs: None drawn  today.    Assessment/Plan:  Artemio is a 21 yo male with history of multiply relapsed ALL now day +63 s/p UCBT following MAC per 2013-09. Isael is well appearing in clinic today with new right acute otitis media.     Right sided acute otitis media: symptoms began 4/17.   - start augmentin BID x 10 days  - call clinic or on-call fellow if you develop fever, rash, worsening ear pain, new drainage, or have any other concerns    Disposition: RTC 4/23 for exam, labs with POLLO    Gabriella YOUSIF-PC  [facility]  Pediatric Blood and Marrow Transplant    I spent a total of 30 minutes with Artemio Nino on the date of encounter doing chart review, history and exam, review of labs/imaging, discussion with the family, documentation and further activities as noted above.

## 2021-04-23 ENCOUNTER — ONCOLOGY VISIT (OUTPATIENT)
Dept: TRANSPLANT | Facility: CLINIC | Age: 21
End: 2021-04-23
Attending: NURSE PRACTITIONER
Payer: COMMERCIAL

## 2021-04-23 ENCOUNTER — DOCUMENTATION ONLY (OUTPATIENT)
Dept: TRANSPLANT | Facility: CLINIC | Age: 21
End: 2021-04-23

## 2021-04-23 ENCOUNTER — INFUSION THERAPY VISIT (OUTPATIENT)
Dept: INFUSION THERAPY | Facility: CLINIC | Age: 21
End: 2021-04-23
Attending: NURSE PRACTITIONER
Payer: COMMERCIAL

## 2021-04-23 VITALS
TEMPERATURE: 99 F | DIASTOLIC BLOOD PRESSURE: 73 MMHG | SYSTOLIC BLOOD PRESSURE: 109 MMHG | BODY MASS INDEX: 20.24 KG/M2 | WEIGHT: 136.69 LBS | RESPIRATION RATE: 20 BRPM | HEIGHT: 69 IN | HEART RATE: 96 BPM

## 2021-04-23 DIAGNOSIS — Z51.81 ENCOUNTER FOR THERAPEUTIC DRUG MONITORING: ICD-10-CM

## 2021-04-23 DIAGNOSIS — C91.01 ACUTE LYMPHOBLASTIC LEUKEMIA (ALL) IN REMISSION (H): ICD-10-CM

## 2021-04-23 DIAGNOSIS — C91.02 ACUTE LYMPHOBLASTIC LEUKEMIA (ALL) IN RELAPSE (H): Primary | ICD-10-CM

## 2021-04-23 LAB
ALBUMIN SERPL-MCNC: 3.6 G/DL (ref 3.4–5)
ALP SERPL-CCNC: 84 U/L (ref 40–150)
ALT SERPL W P-5'-P-CCNC: 161 U/L (ref 0–70)
ANION GAP SERPL CALCULATED.3IONS-SCNC: 7 MMOL/L (ref 3–14)
ANISOCYTOSIS BLD QL SMEAR: SLIGHT
AST SERPL W P-5'-P-CCNC: 94 U/L (ref 0–45)
BASOPHILS # BLD AUTO: 0 10E9/L (ref 0–0.2)
BASOPHILS NFR BLD AUTO: 0 %
BILIRUB SERPL-MCNC: 0.3 MG/DL (ref 0.2–1.3)
BUN SERPL-MCNC: 13 MG/DL (ref 7–30)
CALCIUM SERPL-MCNC: 8.7 MG/DL (ref 8.5–10.1)
CHLORIDE SERPL-SCNC: 108 MMOL/L (ref 94–109)
CMV DNA SPEC NAA+PROBE-ACNC: NORMAL [IU]/ML
CMV DNA SPEC NAA+PROBE-LOG#: NORMAL {LOG_IU}/ML
CO2 SERPL-SCNC: 25 MMOL/L (ref 20–32)
COPATH REPORT: NORMAL
COPATH REPORT: NORMAL
CREAT SERPL-MCNC: 1.05 MG/DL (ref 0.66–1.25)
DACRYOCYTES BLD QL SMEAR: SLIGHT
DIFFERENTIAL METHOD BLD: ABNORMAL
EOSINOPHIL # BLD AUTO: 0.1 10E9/L (ref 0–0.7)
EOSINOPHIL NFR BLD AUTO: 0.9 %
ERYTHROCYTE [DISTWIDTH] IN BLOOD BY AUTOMATED COUNT: 19.3 % (ref 10–15)
GFR SERPL CREATININE-BSD FRML MDRD: >90 ML/MIN/{1.73_M2}
GLUCOSE SERPL-MCNC: 116 MG/DL (ref 70–99)
HCT VFR BLD AUTO: 36 % (ref 40–53)
HGB BLD-MCNC: 11.9 G/DL (ref 13.3–17.7)
LYMPHOCYTES # BLD AUTO: 3.5 10E9/L (ref 0.8–5.3)
LYMPHOCYTES NFR BLD AUTO: 34.2 %
MAGNESIUM SERPL-MCNC: 1.9 MG/DL (ref 1.6–2.3)
MCH RBC QN AUTO: 31.4 PG (ref 26.5–33)
MCHC RBC AUTO-ENTMCNC: 33.1 G/DL (ref 31.5–36.5)
MCV RBC AUTO: 95 FL (ref 78–100)
MICROCYTES BLD QL SMEAR: PRESENT
MONOCYTES # BLD AUTO: 2.4 10E9/L (ref 0–1.3)
MONOCYTES NFR BLD AUTO: 23.7 %
NEUTROPHILS # BLD AUTO: 4.2 10E9/L (ref 1.6–8.3)
NEUTROPHILS NFR BLD AUTO: 41.2 %
NRBC # BLD AUTO: 0.2 10*3/UL
NRBC BLD AUTO-RTO: 2 /100
PLATELET # BLD AUTO: 88 10E9/L (ref 150–450)
PLATELET # BLD EST: ABNORMAL 10*3/UL
POIKILOCYTOSIS BLD QL SMEAR: SLIGHT
POLYCHROMASIA BLD QL SMEAR: SLIGHT
POTASSIUM SERPL-SCNC: 4.1 MMOL/L (ref 3.4–5.3)
PROT SERPL-MCNC: 6.4 G/DL (ref 6.8–8.8)
RBC # BLD AUTO: 3.79 10E12/L (ref 4.4–5.9)
SODIUM SERPL-SCNC: 140 MMOL/L (ref 133–144)
SPECIMEN SOURCE: NORMAL
WBC # BLD AUTO: 10.1 10E9/L (ref 4–11)

## 2021-04-23 PROCEDURE — G0463 HOSPITAL OUTPT CLINIC VISIT: HCPCS

## 2021-04-23 PROCEDURE — 80053 COMPREHEN METABOLIC PANEL: CPT | Performed by: NURSE PRACTITIONER

## 2021-04-23 PROCEDURE — 85025 COMPLETE CBC W/AUTO DIFF WBC: CPT | Performed by: NURSE PRACTITIONER

## 2021-04-23 PROCEDURE — 99215 OFFICE O/P EST HI 40 MIN: CPT | Performed by: NURSE PRACTITIONER

## 2021-04-23 PROCEDURE — 36591 DRAW BLOOD OFF VENOUS DEVICE: CPT

## 2021-04-23 PROCEDURE — 83735 ASSAY OF MAGNESIUM: CPT | Performed by: NURSE PRACTITIONER

## 2021-04-23 PROCEDURE — 250N000011 HC RX IP 250 OP 636

## 2021-04-23 RX ORDER — HEPARIN SODIUM (PORCINE) LOCK FLUSH IV SOLN 100 UNIT/ML 100 UNIT/ML
SOLUTION INTRAVENOUS
Status: COMPLETED
Start: 2021-04-23 | End: 2021-04-23

## 2021-04-23 RX ADMIN — HEPARIN: 100 SYRINGE at 10:59

## 2021-04-23 ASSESSMENT — MIFFLIN-ST. JEOR: SCORE: 1608.76

## 2021-04-23 ASSESSMENT — PAIN SCALES - GENERAL: PAINLEVEL: NO PAIN (0)

## 2021-04-23 NOTE — PROGRESS NOTES
"Pediatric BMT Progress Note  DOS: 04/23/21    Interval History: Glen returns to clinic today for labs and exam. He is well appearing and afebrile. His right ear still feels a bit \"full\" but less painful than previous. He started his augmentin yesterday. He is complaining of a dry and sore throat without cough, SOB, or nasal congestion. Eating and drinking well, no rashes, active bleeding, or GI dysregulation. Medications going well. Energy is great.      Review of Systems: Pertinent positives include those mentioned in interval events. A complete review of systems was performed and is otherwise negative.      Medications:  Fluconazole 200 mg daily  Protonix 40 mg PO BID  Tacrolimus 0.3 mls BIS  Valtrex 1g PO TID  Ativan PRN    Physical Exam:    Vital Signs for Peds 4/23/2021   SYSTOLIC 109   DIASTOLIC 73   PULSE 96   TEMPERATURE 99   RESPIRATIONS 20   WEIGHT (kg) 62 kg   HEIGHT (cm) 175 cm   BMI 20.24   pain    O2      GEN: Sitting in chair, in no acute distress.Talkative with examiner, cooperative. Father present.   HEENT: Allopecia, NC/AT, Pupils equal and reactive, MMM - no sores, lesions, or actively bleeding gums, nares patent.  Posterior OP without erythema. Right TM bulging with purulent fluid present on the lower half, mild erythema.  CARD: RRR, no m/r/g, normal S1/S2. Port in place on R side of chest. Galvan site with bandage over it.  No issues.   RESP: CTAB, normal WOB, no adventitious sounds  ABD: Soft, NT/ND, flat, NABS  EXTREM: MAEE, WWP  SKIN: Dry, no rashes, no lesions  NEURO: Talkative, CNII-XII grossly intact    Labs:   Results for GLEN HAMMER (MRN [id number]) as of 4/23/2021 12:28   Ref. Range 4/23/2021 10:46   Sodium Latest Ref Range: 133 - 144 mmol/L 140   Potassium Latest Ref Range: 3.4 - 5.3 mmol/L 4.1   Chloride Latest Ref Range: 94 - 109 mmol/L 108   Carbon Dioxide Latest Ref Range: 20 - 32 mmol/L 25   Urea Nitrogen Latest Ref Range: 7 - 30 mg/dL 13   Creatinine Latest Ref " Range: 0.66 - 1.25 mg/dL 1.05   GFR Estimate Latest Ref Range: >60 mL/min/1.73_m2 >90   GFR Estimate If Black Latest Ref Range: >60 mL/min/1.73_m2 >90   Calcium Latest Ref Range: 8.5 - 10.1 mg/dL 8.7   Anion Gap Latest Ref Range: 3 - 14 mmol/L 7   Magnesium Latest Ref Range: 1.6 - 2.3 mg/dL 1.9   Albumin Latest Ref Range: 3.4 - 5.0 g/dL 3.6   Protein Total Latest Ref Range: 6.8 - 8.8 g/dL 6.4 (L)   Bilirubin Total Latest Ref Range: 0.2 - 1.3 mg/dL 0.3   Alkaline Phosphatase Latest Ref Range: 40 - 150 U/L 84   ALT Latest Ref Range: 0 - 70 U/L 161 (H)   AST Latest Ref Range: 0 - 45 U/L 94 (H)   Glucose Latest Ref Range: 70 - 99 mg/dL 116 (H)   WBC Latest Ref Range: 4.0 - 11.0 10e9/L 10.1   Hemoglobin Latest Ref Range: 13.3 - 17.7 g/dL 11.9 (L)   Hematocrit Latest Ref Range: 40.0 - 53.0 % 36.0 (L)   Platelet Count Latest Ref Range: 150 - 450 10e9/L 88 (L)   RBC Count Latest Ref Range: 4.4 - 5.9 10e12/L 3.79 (L)   MCV Latest Ref Range: 78 - 100 fl 95   MCH Latest Ref Range: 26.5 - 33.0 pg 31.4   MCHC Latest Ref Range: 31.5 - 36.5 g/dL 33.1   RDW Latest Ref Range: 10.0 - 15.0 % 19.3 (H)   Diff Method Unknown Manual Differential   % Neutrophils Latest Units: % 41.2   % Lymphocytes Latest Units: % 34.2   % Monocytes Latest Units: % 23.7   % Eosinophils Latest Units: % 0.9   % Basophils Latest Units: % 0.0   Nucleated RBCs Latest Ref Range: 0 /100 2 (H)   Absolute Neutrophil Latest Ref Range: 1.6 - 8.3 10e9/L 4.2   Absolute Lymphocytes Latest Ref Range: 0.8 - 5.3 10e9/L 3.5   Absolute Monocytes Latest Ref Range: 0.0 - 1.3 10e9/L 2.4 (H)   Absolute Eosinophils Latest Ref Range: 0.0 - 0.7 10e9/L 0.1   Absolute Basophils Latest Ref Range: 0.0 - 0.2 10e9/L 0.0   Absolute Nucleated RBC Unknown 0.2   Anisocytosis Unknown Slight   Poikilocytosis Unknown Slight   Polychromasia Unknown Slight   Teardrop Cells Unknown Slight   Microcytes Unknown Present   Platelet Estimate Unknown Decreased     Assessment/Plan:  Artemio is a 19 yo  male with history of multiply relapsed ALL now day +66 s/p UCBT following MAC per 2013-09. His post-transplant complications have included C.difficile.  He is clinically well in the outpatient setting although is now on treatment for an ear infection.    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 Of note, his initial post CAR-T therapy was complicated by Grade III CRS necessitating Tociluzimab x3, dopamine pressor support, and steroids. No CRS, neurotoxicity or infectious complications from [place] CAR-T. He received preparative regimen per MT2013-09 with Thiotepa, Fludarabine, Busulfan, and ATG and received a UCBT on 2/16/21. He engrafted on day +20. His Day +21 peripheral VNTRs (3/9) and bone marrow chimerism (3/11) reveals 100% donor chimerism and his flow, morphology, and NGS is negative. FISH negative for ETV6 and JAK2.     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 Continue Tacrolimus taper, started 4/17 and due to complete 6/11.    FEN/Renal: Regular diet as tolerated.      Cardiovascular: History of asymptomatic WPW syndrome (diagnosed 2018): no interventions to date. Most recent EKG (1/27). ECHO (1/26) normal, EF 64%. 24h Zio patch holter (1/27-1/28) revealed c/w WPW with no ventricular ectopies present. Cardiology following.Follow up ECHO in 6 mos (7/2021). Normotensive.     Heme: Transfuse for hemoglobin < 8 (experienced mild dizziness and malaise when hgb in 7s); platelet < 10K. Hx of transfusion reactions (hives): Premedicate with tylenol and benadryl prior to pRBCs and plts. GCSF prn for ANC < 1000.      Infectious Disease: History of C.Difficile colitis (2/17); treated with PO Vancomycin (2/17-2/26) and (3/5-3/15).  Continue Viral prophylaxis (CMV/HSV sero positive): Valtrex. Weekly CMV neg 4/16. Continue Fungal prophylaxis with fluconazole and PCP prophylaxis with bactrim. Developed a R sided AOM for which he continues on Augmentin (started 4/22)-- will continue to monitor.     Immune: No history of hypogammaglobinemia: IgG level 839 (3/17), continue checking q2 weeks and replace if <400 (no hx replacement at [facility]). Does not require IVIG.     GI: Continue Protonix BID. No longer requiring TUMs prn. Continue ativan, zofran and benadryl PRN for nausea. He has transaminitis for which we will obtain viral PCRs at next visit.     Neuro: No longer utilizing oxycodone. L shoulder pain completely resolved. Of note, history of significant spinal headaches; consider using Candelario needle with lumbar punctures.      Fertility: Sperm collected previously for fertility preservation.    Disposition: RTC for exam and labs with POLLO next Friday.    BENJA Rose-AC  [facility] Blood and Marrow Transplant  [facility] [address]  [phone]  [phone]    Total time spent on the following services on the date of the encounter: 45 minutes  Preparing to see patient, chart review, review of outside records, ordering medications, test, procedures, interpretation of labs, imaging and other tests, counseling and educating the patient/family/caregiver, documenting clinical information in the electronic or other health record, and communicating results to the patient/family/caregiver.

## 2021-04-23 NOTE — NURSING NOTE
"Chief Complaint   Patient presents with     RECHECK     ALL,     Vitals:    04/23/21 1018   BP: 109/73   BP Location: Right arm   Patient Position: Sitting   Pulse: 96   Resp: 20   Temp: 99  F (37.2  C)   TempSrc: Oral   Weight: 136 lb 11 oz (62 kg)   Height: 5' 8.9\" (175 cm)           Sailaja Monique M.A.    April 23, 2021  "

## 2021-04-23 NOTE — PATIENT INSTRUCTIONS
Return to [facility] for labs and exam with Jacqueline next Friday 10:00 arrival.     Infusion needs: Port access for labs    Medication changes: None    Care plan changes: None    Contact information  During business hours (7:30am-4:30pm):   To leave a non-urgent voicemail: call triage line [phone]    To call for time-sensitive needs or concerns : call clinic  [phone]    Evenings after 4:30pm, weekends, and holidays:   For any needs or concerns: call for BMT fellow at [phone] 911 in the case of an emergency    Thank you!   Scheduled 4/30/2021 at 10:00am with Jacqueline Shin and port labs. DAYAN

## 2021-04-23 NOTE — PROGRESS NOTES
Infusion Nursing Note    Artemio Nino Presents to [facility] today for: Port access/labs    Due to : ALL    Infusion Note: Port accessed by sterile technique without issue.  Labs drawn as ordered and deaccessed immediately after lab draw.  Pt. Seen and assessed by Jacqueline Shin NP after labs drawn.  No other nursing cares needed.

## 2021-04-30 ENCOUNTER — ONCOLOGY VISIT (OUTPATIENT)
Dept: TRANSPLANT | Facility: CLINIC | Age: 21
End: 2021-04-30
Attending: NURSE PRACTITIONER
Payer: COMMERCIAL

## 2021-04-30 ENCOUNTER — INFUSION THERAPY VISIT (OUTPATIENT)
Dept: INFUSION THERAPY | Facility: CLINIC | Age: 21
End: 2021-04-30
Attending: NURSE PRACTITIONER
Payer: COMMERCIAL

## 2021-04-30 VITALS
DIASTOLIC BLOOD PRESSURE: 67 MMHG | TEMPERATURE: 98.3 F | WEIGHT: 132.94 LBS | HEART RATE: 91 BPM | HEIGHT: 69 IN | RESPIRATION RATE: 16 BRPM | SYSTOLIC BLOOD PRESSURE: 98 MMHG | BODY MASS INDEX: 19.69 KG/M2 | OXYGEN SATURATION: 99 %

## 2021-04-30 DIAGNOSIS — C91.01 ACUTE LYMPHOBLASTIC LEUKEMIA (ALL) IN REMISSION (H): Primary | ICD-10-CM

## 2021-04-30 DIAGNOSIS — Z94.81 STATUS POST BONE MARROW TRANSPLANT (H): ICD-10-CM

## 2021-04-30 DIAGNOSIS — C91.01 ACUTE LYMPHOBLASTIC LEUKEMIA (ALL) IN REMISSION (H): ICD-10-CM

## 2021-04-30 DIAGNOSIS — C91.02 ACUTE LYMPHOBLASTIC LEUKEMIA (ALL) IN RELAPSE (H): Primary | ICD-10-CM

## 2021-04-30 LAB
ALBUMIN SERPL-MCNC: 3.7 G/DL (ref 3.4–5)
ALP SERPL-CCNC: 81 U/L (ref 40–150)
ALT SERPL W P-5'-P-CCNC: 146 U/L (ref 0–70)
ANION GAP SERPL CALCULATED.3IONS-SCNC: 5 MMOL/L (ref 3–14)
ANISOCYTOSIS BLD QL SMEAR: SLIGHT
AST SERPL W P-5'-P-CCNC: 96 U/L (ref 0–45)
BASOPHILS # BLD AUTO: 0.1 10E9/L (ref 0–0.2)
BASOPHILS NFR BLD AUTO: 0.9 %
BILIRUB SERPL-MCNC: 0.4 MG/DL (ref 0.2–1.3)
BUN SERPL-MCNC: 13 MG/DL (ref 7–30)
CALCIUM SERPL-MCNC: 9 MG/DL (ref 8.5–10.1)
CHLORIDE SERPL-SCNC: 109 MMOL/L (ref 94–109)
CMV DNA SPEC NAA+PROBE-ACNC: NORMAL [IU]/ML
CMV DNA SPEC NAA+PROBE-LOG#: NORMAL {LOG_IU}/ML
CO2 SERPL-SCNC: 25 MMOL/L (ref 20–32)
CREAT SERPL-MCNC: 0.96 MG/DL (ref 0.66–1.25)
DIFFERENTIAL METHOD BLD: ABNORMAL
EOSINOPHIL # BLD AUTO: 0 10E9/L (ref 0–0.7)
EOSINOPHIL NFR BLD AUTO: 0 %
ERYTHROCYTE [DISTWIDTH] IN BLOOD BY AUTOMATED COUNT: 19 % (ref 10–15)
GFR SERPL CREATININE-BSD FRML MDRD: >90 ML/MIN/{1.73_M2}
GLUCOSE SERPL-MCNC: 117 MG/DL (ref 70–99)
HCT VFR BLD AUTO: 36.9 % (ref 40–53)
HGB BLD-MCNC: 12.1 G/DL (ref 13.3–17.7)
LDH SERPL L TO P-CCNC: 287 U/L (ref 85–227)
LYMPHOCYTES # BLD AUTO: 2 10E9/L (ref 0.8–5.3)
LYMPHOCYTES NFR BLD AUTO: 26.7 %
MAGNESIUM SERPL-MCNC: 2 MG/DL (ref 1.6–2.3)
MCH RBC QN AUTO: 31.1 PG (ref 26.5–33)
MCHC RBC AUTO-ENTMCNC: 32.8 G/DL (ref 31.5–36.5)
MCV RBC AUTO: 95 FL (ref 78–100)
METAMYELOCYTES # BLD: 0.1 10E9/L
METAMYELOCYTES NFR BLD MANUAL: 0.9 %
MICROCYTES BLD QL SMEAR: PRESENT
MONOCYTES # BLD AUTO: 1.1 10E9/L (ref 0–1.3)
MONOCYTES NFR BLD AUTO: 14.7 %
NEUTROPHILS # BLD AUTO: 4.2 10E9/L (ref 1.6–8.3)
NEUTROPHILS NFR BLD AUTO: 56.8 %
NRBC # BLD AUTO: 0.2 10*3/UL
NRBC BLD AUTO-RTO: 3 /100
PLATELET # BLD AUTO: 98 10E9/L (ref 150–450)
PLATELET # BLD EST: ABNORMAL 10*3/UL
POLYCHROMASIA BLD QL SMEAR: SLIGHT
POTASSIUM SERPL-SCNC: 3.8 MMOL/L (ref 3.4–5.3)
PROT SERPL-MCNC: 6.5 G/DL (ref 6.8–8.8)
RBC # BLD AUTO: 3.89 10E12/L (ref 4.4–5.9)
RBC INCLUSIONS BLD: SLIGHT
SODIUM SERPL-SCNC: 139 MMOL/L (ref 133–144)
SPECIMEN SOURCE: NORMAL
WBC # BLD AUTO: 7.4 10E9/L (ref 4–11)

## 2021-04-30 PROCEDURE — 99215 OFFICE O/P EST HI 40 MIN: CPT | Performed by: NURSE PRACTITIONER

## 2021-04-30 PROCEDURE — 83735 ASSAY OF MAGNESIUM: CPT | Performed by: NURSE PRACTITIONER

## 2021-04-30 PROCEDURE — 80053 COMPREHEN METABOLIC PANEL: CPT | Performed by: NURSE PRACTITIONER

## 2021-04-30 PROCEDURE — 250N000011 HC RX IP 250 OP 636

## 2021-04-30 PROCEDURE — 85025 COMPLETE CBC W/AUTO DIFF WBC: CPT | Performed by: NURSE PRACTITIONER

## 2021-04-30 PROCEDURE — 36591 DRAW BLOOD OFF VENOUS DEVICE: CPT

## 2021-04-30 PROCEDURE — G0463 HOSPITAL OUTPT CLINIC VISIT: HCPCS

## 2021-04-30 PROCEDURE — 83615 LACTATE (LD) (LDH) ENZYME: CPT | Performed by: NURSE PRACTITIONER

## 2021-04-30 RX ORDER — FLUCONAZOLE 200 MG/1
200 TABLET ORAL DAILY
Qty: 30 TABLET | Refills: 3 | Status: SHIPPED | OUTPATIENT
Start: 2021-04-30 | End: 2021-05-14

## 2021-04-30 RX ORDER — HEPARIN SODIUM (PORCINE) LOCK FLUSH IV SOLN 100 UNIT/ML 100 UNIT/ML
5 SOLUTION INTRAVENOUS
Status: DISCONTINUED | OUTPATIENT
Start: 2021-04-30 | End: 2021-04-30 | Stop reason: HOSPADM

## 2021-04-30 RX ORDER — HEPARIN SODIUM (PORCINE) LOCK FLUSH IV SOLN 100 UNIT/ML 100 UNIT/ML
SOLUTION INTRAVENOUS
Status: COMPLETED
Start: 2021-04-30 | End: 2021-04-30

## 2021-04-30 RX ADMIN — HEPARIN 5 ML: 100 SYRINGE at 10:20

## 2021-04-30 RX ADMIN — HEPARIN SODIUM (PORCINE) LOCK FLUSH IV SOLN 100 UNIT/ML 5 ML: 100 SOLUTION at 10:20

## 2021-04-30 ASSESSMENT — MIFFLIN-ST. JEOR: SCORE: 1590.5

## 2021-04-30 ASSESSMENT — PAIN SCALES - GENERAL: PAINLEVEL: NO PAIN (0)

## 2021-04-30 NOTE — PROGRESS NOTES
Pediatric BMT Progress Note  DOS: 04/30/21    Interval History: Glen returns to clinic today for labs and exam. He remains well appearing and had a good day playing soccer with friends yesterday. He remains afebrile without URI symptoms or fatigue. His ear feels like painful and full, however he does feel his ear being blocked by cerumen. He continues to eat fairly well, however has been having diarrhea since initiating Augmentin-- denies abdominal cramping and says this is generally tolerable. His weight is down today likely attributed to this diarrhea.    Review of Systems: Pertinent positives include those mentioned in interval events. A complete review of systems was performed and is otherwise negative.      Medications: see eMAR, up to date    Physical Exam:  Vital Signs for Peds 4/30/2021   SYSTOLIC 98   DIASTOLIC 67   PULSE 91   TEMPERATURE 98.3   RESPIRATIONS 16   WEIGHT (kg) 60.3 kg   HEIGHT (cm) 174.8 cm   BMI 19.73   pain    O2 99     GEN: Sitting in chair, in no acute distress. Talkative with examiner, cooperative. Father present.   HEENT: Allopecia, NC/AT. Pupils equal and reactive, MMM, nares patent.  Posterior OP without erythema. Right TM blocked by cerumen.  CARD: RRR, no m/r/g, normal S1/S2.   RESP: CTAB, normal WOB, no adventitious sounds  ABD: Soft, NT/ND, flat, NABS  EXTREM: MAEE, WWP  SKIN: Dry, no rashes, no lesions  NEURO: Talkative, CNII-XII grossly intact  ACCESS: Port in place on R side of chest.    Labs:  Results for GLEN HAMMER (MRN [id number]) as of 4/30/2021 13:16   Ref. Range 4/30/2021 10:20   Sodium Latest Ref Range: 133 - 144 mmol/L 139   Potassium Latest Ref Range: 3.4 - 5.3 mmol/L 3.8   Chloride Latest Ref Range: 94 - 109 mmol/L 109   Carbon Dioxide Latest Ref Range: 20 - 32 mmol/L 25   Urea Nitrogen Latest Ref Range: 7 - 30 mg/dL 13   Creatinine Latest Ref Range: 0.66 - 1.25 mg/dL 0.96   GFR Estimate Latest Ref Range: >60 mL/min/1.73_m2 >90   GFR Estimate If Black  Latest Ref Range: >60 mL/min/1.73_m2 >90   Calcium Latest Ref Range: 8.5 - 10.1 mg/dL 9.0   Anion Gap Latest Ref Range: 3 - 14 mmol/L 5   Magnesium Latest Ref Range: 1.6 - 2.3 mg/dL 2.0   Albumin Latest Ref Range: 3.4 - 5.0 g/dL 3.7   Protein Total Latest Ref Range: 6.8 - 8.8 g/dL 6.5 (L)   Bilirubin Total Latest Ref Range: 0.2 - 1.3 mg/dL 0.4   Alkaline Phosphatase Latest Ref Range: 40 - 150 U/L 81   ALT Latest Ref Range: 0 - 70 U/L 146 (H)   AST Latest Ref Range: 0 - 45 U/L 96 (H)   Lactate Dehydrogenase Latest Ref Range: 85 - 227 U/L 287 (H)   Glucose Latest Ref Range: 70 - 99 mg/dL 117 (H)   WBC Latest Ref Range: 4.0 - 11.0 10e9/L 7.4   Hemoglobin Latest Ref Range: 13.3 - 17.7 g/dL 12.1 (L)   Hematocrit Latest Ref Range: 40.0 - 53.0 % 36.9 (L)   Platelet Count Latest Ref Range: 150 - 450 10e9/L 98 (L)   RBC Count Latest Ref Range: 4.4 - 5.9 10e12/L 3.89 (L)   MCV Latest Ref Range: 78 - 100 fl 95   MCH Latest Ref Range: 26.5 - 33.0 pg 31.1   MCHC Latest Ref Range: 31.5 - 36.5 g/dL 32.8   RDW Latest Ref Range: 10.0 - 15.0 % 19.0 (H)   Diff Method Unknown Manual Differential   % Neutrophils Latest Units: % 56.8   % Lymphocytes Latest Units: % 26.7   % Monocytes Latest Units: % 14.7   % Eosinophils Latest Units: % 0.0   % Basophils Latest Units: % 0.9   % Metamyelocytes Latest Units: % 0.9   Nucleated RBCs Latest Ref Range: 0 /100 3 (H)   Absolute Neutrophil Latest Ref Range: 1.6 - 8.3 10e9/L 4.2   Absolute Lymphocytes Latest Ref Range: 0.8 - 5.3 10e9/L 2.0   Absolute Monocytes Latest Ref Range: 0.0 - 1.3 10e9/L 1.1   Absolute Eosinophils Latest Ref Range: 0.0 - 0.7 10e9/L 0.0   Absolute Basophils Latest Ref Range: 0.0 - 0.2 10e9/L 0.1   Absolute Metamyelocytes Latest Ref Range: 0 10e9/L 0.1 (H)   Absolute Nucleated RBC Unknown 0.2   Anisocytosis Unknown Slight   Polychromasia Unknown Slight   RBC Fragments Unknown Slight   Microcytes Unknown Present   Platelet Estimate Unknown Confirming automated cell count      Assessment/Plan:  Artemio is a 19 yo male with 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 His post-transplant complications have included C.difficile.  He is clinically well in the outpatient setting, completing treatment for AOM, experiencing diarrhea with antibiotics.    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 Of note, his initial post CAR-T therapy was complicated by Grade III CRS necessitating Tociluzimab x3, dopamine pressor support, and steroids. No CRS, neurotoxicity or infectious complications from [facility] CAR-T. He received preparative regimen per MT2013-09 with Thiotepa, Fludarabine, Busulfan, and ATG and received a UCBT on 2/16/21. He engrafted on day +20. His Day +21 peripheral VNTRs (3/9) and bone marrow chimerism (3/11) reveals 100% donor chimerism and his flow, morphology, and NGS is negative. FISH negative for ETV6 and JAK2.     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 Continue Tacrolimus taper, started 4/17 and due to complete 6/11.    FEN/Renal: Regular diet as tolerated.      Cardiovascular: History of asymptomatic WPW syndrome (diagnosed 2018): no interventions to date. Most recent EKG (1/27). ECHO (1/26) normal, EF 64%. 24h Zio patch holter (1/27-1/28) revealed c/w WPW with no ventricular ectopies present. Cardiology following.Follow up ECHO in 6 mos (7/2021). Normotensive.     Heme: Transfuse for hemoglobin < 8 (experienced mild dizziness and malaise when hgb in 7s); platelet < 10K. Hx of transfusion reactions (hives): Premedicate with tylenol and benadryl prior to pRBCs and plts. GCSF prn for ANC < 1000.      Infectious Disease: History of C.Difficile colitis  (2/17); treated with PO Vancomycin (2/17-2/26) and (3/5-3/15). Continue Viral prophylaxis (CMV/HSV sero positive): Valtrex. Weekly CMV neg 4/16. Continue Fungal prophylaxis with fluconazole and PCP prophylaxis with bactrim. Developed a R sided AOM for which he continues on Augmentin (started 4/22) and completes today. His ear feels less full and painful, however he does feel cerumen build up which is affecting hearing-- will reach out to ENT to inquire about minimally invasive clean out.    Immune: No history of hypogammaglobinemia: IgG level 839 (3/17), continue checking q2 weeks and replace if <400 (no hx replacement at [facility]). Does not require IVIG.     GI: Continue Protonix BID. No longer requiring TUMs prn. Continue ativan, zofran and benadryl PRN for nausea. He has transaminitis for which we will obtain viral PCRs at next visit. Diarrhea this past week likely attributed to Augmentin therapy. Yogurt encouraged, continue to monitor as therapy completing today.     Neuro: No longer utilizing oxycodone. L shoulder pain completely resolved. Of note, history of significant spinal headaches; consider using Candelario needle with lumbar punctures.      Fertility: Sperm collected previously for fertility preservation.    Disposition: RTC for exam and labs with Dr. Zavala on Friday, 5/7.    BENJA Rose-LOREZNA  [facility] Blood and Marrow Transplant  [facility] [address]  [phone]  [phone]    Total time spent on the following services on the date of the encounter: 60 minutes  Preparing to see patient, chart review, review of outside records, ordering medications, test, procedures, interpretation of labs, imaging and other tests, counseling and educating the patient/family/caregiver, documenting clinical information in the electronic or other health record, and communicating results to the patient/family/caregiver.

## 2021-04-30 NOTE — PATIENT INSTRUCTIONS
Return to [facility] for labs and exam with Dr. Zavala on Friday, 5/7 as scheduled.     Infusion needs: None    Patient has PICC, Central line, CVC line, to be drawn off of per lab.     Medication changes: None    Care plan changes: Will contact ENT about cerumen removal    Contact information  During business hours (7:30am-4:30pm):   To leave a non-urgent voicemail: call triage line [phone]    To call for time-sensitive needs or concerns : call clinic  [phone]    Evenings after 4:30pm, weekends, and holidays:   For any needs or concerns: call for BMT fellow at [phone] in the case of an emergency    Thank you!     No further follow up instructions as of 05/03 @ 1:36pm. SADI

## 2021-04-30 NOTE — PROGRESS NOTES
Infusion Nursing Note    Artemio Nino presents to the [facility]fusion [facility] today for: port labs     Due to :    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)  Acute lymphoblastic leukemia (ALL) in remission (H)    Intravenous Access/Labs: single lumen port was accessed using sterile technique in 1 attempt without issue. Labs were obtained as ordered and sent to lab.     Coping:   Child Family Life: declined    Infusion Note: Blood return noted for lab draw. Port Heparin locked and de-accessed without difficulty. Patient seen and assessed by Jacqueline Shin NP while in clinic.

## 2021-04-30 NOTE — NURSING NOTE
"Chief Complaint   Patient presents with     RECHECK     Patient is here for ALL follow up       BP 98/67 (BP Location: Left arm, Patient Position: Fowlers, Cuff Size: Adult Regular)   Pulse 91   Temp 98.3  F (36.8  C) (Oral)   Resp 16   Ht 1.748 m (5' 8.82\")   Wt 60.3 kg (132 lb 15 oz)   SpO2 99%   BMI 19.73 kg/m      I have reviewed the patient's allergy and medication lists.    Agnes Longoria, EMT  April 30, 2021  "

## 2021-05-04 ENCOUNTER — OFFICE VISIT (OUTPATIENT)
Dept: OTOLARYNGOLOGY | Facility: CLINIC | Age: 21
End: 2021-05-04
Attending: NURSE PRACTITIONER
Payer: COMMERCIAL

## 2021-05-04 VITALS — BODY MASS INDEX: 19.74 KG/M2 | HEIGHT: 69 IN | WEIGHT: 133.3 LBS | TEMPERATURE: 98.5 F

## 2021-05-04 DIAGNOSIS — H61.23 BILATERAL IMPACTED CERUMEN: Primary | ICD-10-CM

## 2021-05-04 PROCEDURE — G0463 HOSPITAL OUTPT CLINIC VISIT: HCPCS | Mod: 25

## 2021-05-04 PROCEDURE — 99202 OFFICE O/P NEW SF 15 MIN: CPT | Mod: 25 | Performed by: NURSE PRACTITIONER

## 2021-05-04 PROCEDURE — 92504 EAR MICROSCOPY EXAMINATION: CPT | Performed by: NURSE PRACTITIONER

## 2021-05-04 ASSESSMENT — PAIN SCALES - GENERAL: PAINLEVEL: NO PAIN (0)

## 2021-05-04 ASSESSMENT — MIFFLIN-ST. JEOR: SCORE: 1595.02

## 2021-05-04 NOTE — LETTER
5/4/2021      RE: Artemio Nino  [address] [address]       Pediatric Otolaryngology and Facial Plastic Surgery    CC:   Chief Complaints and History of Present Illnesses   Patient presents with     Ear Problem     Pt here for ear cleaning.       Referring Provider: Nickolas:  Date of Service: 05/04/21      Dear Dr. Olmos,    I had the pleasure of meeting Artemio Nino in consultation today at your request in the [facility] Hearing and ENT [facility].    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 Artemio states that he has recently been treated with Augmentin for a right sided ear infection. Providers noted significant cerumen during ear exam. He feels that his hearing was slightly decreased during the infection but feels that it is improved now. No history of ear disease, ROM, or childhood hearing loss. He has not had any other history of cerumen impactions.      PMH:  Born term, No NICU stay, passed New Born Hearing Screen, Immunizations up to date.   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)      History of blood transfusion      WPW (Aaron-Parkinson-White syndrome) 03/2018        PSH:  Past Surgical History:   Procedure Laterality Date     BONE MARROW BIOPSY, BONE SPECIMEN, NEEDLE/TROCAR Right 11/27/2018    Procedure: bone marrow biopsy;  Surgeon: Jacqueline Fitzgerald NP;  [facility]      BONE MARROW BIOPSY, BONE SPECIMEN, NEEDLE/TROCAR N/A 2/8/2019    Procedure: BIOPSY BONE MARROW;  Surgeon: Lisa Workman NP;  [facility]      BONE MARROW BIOPSY, BONE SPECIMEN, NEEDLE/TROCAR N/A 5/16/2019    Procedure: BIOPSY, BONE MARROW;  Surgeon: Lilia López APRN CNP;  [facility]     BONE MARROW BIOPSY, BONE SPECIMEN,  NEEDLE/TROCAR N/A 11/7/2019    Procedure: Bone marrow biopsy;  Surgeon: Jacqueline Shin NP;  [facility]      BONE MARROW BIOPSY, BONE SPECIMEN, NEEDLE/TROCAR Right 6/10/2020    Procedure: BIOPSY, BONE MARROW;  Surgeon: Candido Han PA-C;  [facility]      BONE MARROW BIOPSY, BONE SPECIMEN, NEEDLE/TROCAR N/A 8/24/2020    Procedure: Bone marrow biopsy;  Surgeon: Jacqueline Shin NP;  [facility]      BONE MARROW BIOPSY, BONE SPECIMEN, NEEDLE/TROCAR N/A 9/25/2020    Procedure: BIOPSY, BONE MARROW;  Surgeon: Lilia López APRN CNP;  [facility]      BONE MARROW BIOPSY, BONE SPECIMEN, NEEDLE/TROCAR N/A 3/11/2021    Procedure: BIOPSY BONE MARROW;  Surgeon: Lisa Workman NP;  [facility]      ESOPHAGOSCOPY, GASTROSCOPY, DUODENOSCOPY (EGD), COMBINED N/A 2/22/2019    Procedure: Upper endoscopy with biopsy;  Surgeon: Magdalene Hobson MD;  [facility]      INSERT CATHETER VASCULAR ACCESS N/A 7/20/2020    Procedure: NON -APHERESIS Double lumen tunneled central burns line placement;  Surgeon: Pato Gómez PA-C;  [facility]      INSERT CATHETER VASCULAR ACCESS N/A 2/8/2021    Procedure: apheresis catheter placement;  Surgeon: Pato Gómez PA-C;  [facility]      INSERT CATHETER VASCULAR ACCESS APHERESIS CHILD N/A 6/10/2020    Procedure: Large Bore Double Lumen NON TUNNELED Apheresis Catheter placement;  Surgeon: Link Rios PA-C;  [facility]      INSERT CATHETER VASCULAR ACCESS DOUBLE LUMEN CHILD N/A 10/26/2018    Procedure: double lumen tunneled line placement;  Surgeon: Rex Valente MD;  [facility]      INSERT PORT VASCULAR ACCESS N/A 6/10/2020    Procedure: Port placement;  Surgeon: Link Rios PA-C;  [facility]      IR CHEST PORT PLACEMENT > 5 YRS OF AGE  6/10/2020     IR CVC NON TUNNEL LINE REMOVAL   6/12/2020     IR CVC NON TUNNEL PLACEMENT  6/10/2020     IR CVC TUNNEL PLACEMENT > 5 YRS OF AGE  7/20/2020     IR CVC TUNNEL PLACEMENT > 5 YRS OF AGE  2/8/2021     IR CVC TUNNEL REMOVAL LEFT  2/8/2019     IR CVC TUNNEL REMOVAL LEFT  8/24/2020     IR CVC TUNNEL REMOVAL LEFT  4/9/2021     IR PORT REMOVAL LEFT  5/16/2019     O CLAVICLE LEFT Left     fracture with surgical repair and plate/screws     ORTHOPEDIC SURGERY      femur     REMOVE CATHETER VASCULAR ACCESS N/A 2/8/2019    Procedure: Tunneled line removal;  Surgeon: Krystal Esparza MD;  [facility]      REMOVE CATHETER VASCULAR ACCESS N/A 8/24/2020    Procedure: tunneled line removal;  Surgeon: Siri Hernandez PA-C;  [facility]      REMOVE CATHETER VASCULAR ACCESS Left 4/9/2021    Procedure: REMOVAL, VASCULAR ACCESS CATHETER;  Surgeon: Link Rios PA-C;  [facility]      REMOVE PORT VASCULAR ACCESS N/A 5/16/2019    Procedure: REMOVAL, VASCULAR ACCESS PORT;  Surgeon: Link Rios PA-C;  [facility]       Medications:    Current Outpatient Medications   Medication Sig Dispense Refill     fluconazole (DIFLUCAN) 200 MG tablet Take 1 tablet (200 mg) by mouth daily 30 tablet 3     LORazepam (ATIVAN) 1 MG tablet Take 1 tablet (1 mg) by mouth 2 times daily as needed for anxiety 60 tablet 0     magnesium oxide (MAG-OX) 400 (241.3 Mg) MG tablet Take 1 tablet (400 mg) by mouth 2 times daily 60 tablet 3     pantoprazole (PROTONIX) 40 MG EC tablet Take 1 tablet (40 mg) by mouth daily 60 tablet 2     sulfamethoxazole-trimethoprim (BACTRIM) 400-80 MG tablet Take 2 tablets by mouth Every Mon, Tues two times daily 32 tablet 1     tacrolimus (GENERIC EQUIVALENT) 1 MG capsule See Tacrolimus Suspension Rx for current dosing 60 capsule 3     tacrolimus (GENERIC EQUIVALENT) 1 mg/mL suspension Follow taper schedule 60 mL 3     valACYclovir (VALTREX) 1000 mg tablet Take 1 tablet (1,000 mg) by mouth 3 times  daily 90 tablet 1       Allergies:   Allergies   Allergen Reactions     Blood Transfusion Related (Informational Only) Other (See Comments)     Patient has a history of a clinically significant antibody against RBC antigens.  A delay in compatible RBCs may occur.Stem cell transplant patient.  Give type O RBCs.  Requires Benadryl and tylenol as premed for platelets and RBCs for hx of hives     Voriconazole Photosensitivity     Significant rash - do not rechallenge       Social History:  No smoke exposure  lives with parents   Social History     Socioeconomic History     Marital status: Single     Spouse name: Not on file     Number of children: Not on file     Years of education: Not on file     Highest education level: Not on file   Occupational History     Not on file   Social Needs     Financial resource strain: Not on file     Food insecurity     Worry: Not on file     Inability: Not on file     Transportation needs     Medical: Not on file     Non-medical: Not on file   Tobacco Use     Smoking status: Never Smoker     Smokeless tobacco: Never Used   Substance and Sexual Activity     Alcohol use: Never     Frequency: Never     Drug use: Never     Sexual activity: Not on file   Lifestyle     Physical activity     Days per week: Not on file     Minutes per session: Not on file     Stress: Not on file   Relationships     Social connections     Talks on phone: Not on file     Gets together: Not on file     Attends [employer] service: Not on file     Active member of club or organization: Not on file     Attends meetings of clubs or organizations: Not on file     Relationship status: Not on file     Intimate partner violence     Fear of current or ex partner: Not on file     Emotionally abused: Not on file     Physically abused: Not on file     Forced sexual activity: Not on file   Other Topics Concern     Parent/sibling w/ CABG, MI or angioplasty before 65F 55M? Not Asked   Social History Narrative     Not on file  "      REVIEW OF SYSTEMS:  12 point ROS obtained and was negative other than the symptoms noted above in the HPI.    PHYSICAL EXAMINATION:  Temp 98.5  F (36.9  C) (Temporal)   Ht 5' 9\" (175.3 cm)   Wt 133 lb 4.8 oz (60.5 kg)   BMI 19.68 kg/m    GENERAL: NAD. Sitting comfortably in exam chair.    HEAD: normocephalic, atraumatic. Alopecia    EYES: EOMs intact. Sclera white    EARS: EACs of normal caliber with significant cerumen.  Right TM is intact. Some tympanosclerosis noted. No obvious effusion or retraction appreciated.  Left TM is intact. No obvious effusion or retraction appreciated.    NOSE: nasal septum is midline and stable. No drainage noted.    MOUTH: MMM. Lips are intact. No lesions noted. Tongue midline.    NECK: Supple, trachea midline. No significant lymphadenopathy noted.     RESP: Symmetric chest expansion. No respiratory distress.    Imaging reviewed: None    Laboratory reviewed: None    Audiology reviewed: N/A    Procedure: Cerumenectomy. A binocular microscope was used to examine ears bilaterally. A right angle pick and empty alligator was used to remove cerumen from ears bilaterally. He tolerated the procedure well and without issue.    Impressions and Recommendations:  Artemio is a 20 year old male with a history of ALL now day +80 s/p BMT.  He was recently treated for a right otitis media. Ear has healed well. Significant cerumen to ears bilaterally were easily debrided. Ears are healthy appearing. He may follow up as needed with ongoing concerns.      Thank you for allowing me to participate in the care of Artemio. Please don't hesitate to contact me.      SNOW Mercer, LE  Pediatric Otolaryngology and Facial Plastic Surgery  Department of Otolaryngology  Formerly named [facility] [phone]  [email or web address]      "

## 2021-05-04 NOTE — PATIENT INSTRUCTIONS
1.  You were seen in the ENT Clinic today by SNOW Mercer. If you have any questions or concerns after your appointment, please call [phone].    2.  Plan is to follow-up as needed.    Thank you!  Shan Barrios, EMT

## 2021-05-04 NOTE — NURSING NOTE
"Chief Complaint   Patient presents with     Ear Problem     Pt here for ear cleaning.       Temp 98.5  F (36.9  C) (Temporal)   Ht 5' 9\" (175.3 cm)   Wt 133 lb 4.8 oz (60.5 kg)   BMI 19.68 kg/m      Deb Garcia  "

## 2021-05-04 NOTE — PROGRESS NOTES
Pediatric Otolaryngology and Facial Plastic Surgery    CC:   Chief Complaints and History of Present Illnesses   Patient presents with     Ear Problem     Pt here for ear cleaning.       Referring Provider: Nickolas:  Date of Service: 05/04/21      Dear Dr. Olmos,    I had the pleasure of meeting Artemio Nino in consultation today at your request in the [facility].    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 Artemio states that he has recently been treated with Augmentin for a right sided ear infection. Providers noted significant cerumen during ear exam. He feels that his hearing was slightly decreased during the infection but feels that it is improved now. No history of ear disease, ROM, or childhood hearing loss. He has not had any other history of cerumen impactions.      PMH:  Born term, No NICU stay, passed New Born Hearing Screen, Immunizations up to date.   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)      History of blood transfusion      WPW (Aaron-Parkinson-White syndrome) 03/2018        PSH:  Past Surgical History:   Procedure Laterality Date     BONE MARROW BIOPSY, BONE SPECIMEN, NEEDLE/TROCAR Right 11/27/2018    Procedure: bone marrow biopsy;  Surgeon: Jacqueline Fitzgerald NP;  [facility]      BONE MARROW BIOPSY, BONE SPECIMEN, NEEDLE/TROCAR N/A 2/8/2019    Procedure: BIOPSY BONE MARROW;  Surgeon: Lisa Workman NP;  [facility]      BONE MARROW BIOPSY, BONE SPECIMEN, NEEDLE/TROCAR N/A 5/16/2019    Procedure: BIOPSY, BONE MARROW;  Surgeon: Lilia López APRN CNP;  [facility]     BONE MARROW BIOPSY, BONE SPECIMEN, NEEDLE/TROCAR N/A 11/7/2019    Procedure: Bone marrow biopsy;  Surgeon: Jacqueline Shin NP;   [facility]      BONE MARROW BIOPSY, BONE SPECIMEN, NEEDLE/TROCAR Right 6/10/2020    Procedure: BIOPSY, BONE MARROW;  Surgeon: Candido Han PA-C;  [facility]      BONE MARROW BIOPSY, BONE SPECIMEN, NEEDLE/TROCAR N/A 8/24/2020    Procedure: Bone marrow biopsy;  Surgeon: Jacqueline Shin, NP;  [facility]      BONE MARROW BIOPSY, BONE SPECIMEN, NEEDLE/TROCAR N/A 9/25/2020    Procedure: BIOPSY, BONE MARROW;  Surgeon: Lilia López APRN CNP;  [facility]      BONE MARROW BIOPSY, BONE SPECIMEN, NEEDLE/TROCAR N/A 3/11/2021    Procedure: BIOPSY BONE MARROW;  Surgeon: Lisa Workman NP;  [facility]      ESOPHAGOSCOPY, GASTROSCOPY, DUODENOSCOPY (EGD), COMBINED N/A 2/22/2019    Procedure: Upper endoscopy with biopsy;  Surgeon: Magdalene Hobson MD;  [facility]      INSERT CATHETER VASCULAR ACCESS N/A 7/20/2020    Procedure: NON -APHERESIS Double lumen tunneled central burns line placement;  Surgeon: Pato Gómez PA-C;  [facility]      INSERT CATHETER VASCULAR ACCESS N/A 2/8/2021    Procedure: apheresis catheter placement;  Surgeon: Pato Gómez PA-C;  [facility]      INSERT CATHETER VASCULAR ACCESS APHERESIS CHILD N/A 6/10/2020    Procedure: Large Bore Double Lumen NON TUNNELED Apheresis Catheter placement;  Surgeon: Link Rios PA-C;  [facility]      INSERT CATHETER VASCULAR ACCESS DOUBLE LUMEN CHILD N/A 10/26/2018    Procedure: double lumen tunneled line placement;  Surgeon: Rex Valente MD;  [facility]      INSERT PORT VASCULAR ACCESS N/A 6/10/2020    Procedure: Port placement;  Surgeon: Link Rios PA-C;  [facility]      IR CHEST PORT PLACEMENT > 5 YRS OF AGE  6/10/2020     IR CVC NON TUNNEL LINE REMOVAL  6/12/2020     IR CVC NON TUNNEL PLACEMENT  6/10/2020     IR CVC TUNNEL PLACEMENT > 5 YRS OF AGE   7/20/2020     IR CVC TUNNEL PLACEMENT > 5 YRS OF AGE  2/8/2021     IR CVC TUNNEL REMOVAL LEFT  2/8/2019     IR CVC TUNNEL REMOVAL LEFT  8/24/2020     IR CVC TUNNEL REMOVAL LEFT  4/9/2021     IR PORT REMOVAL LEFT  5/16/2019     O CLAVICLE LEFT Left     fracture with surgical repair and plate/screws     ORTHOPEDIC SURGERY      femur     REMOVE CATHETER VASCULAR ACCESS N/A 2/8/2019    Procedure: Tunneled line removal;  Surgeon: Krystal Esparza MD;  [facility]      REMOVE CATHETER VASCULAR ACCESS N/A 8/24/2020    Procedure: tunneled line removal;  Surgeon: Siri Hernandez PA-C;  [facility]      REMOVE CATHETER VASCULAR ACCESS Left 4/9/2021    Procedure: REMOVAL, VASCULAR ACCESS CATHETER;  Surgeon: Link Rios PA-C;  [facility]      REMOVE PORT VASCULAR ACCESS N/A 5/16/2019    Procedure: REMOVAL, VASCULAR ACCESS PORT;  Surgeon: Link Rios PA-C;  [facility]       Medications:    Current Outpatient Medications   Medication Sig Dispense Refill     fluconazole (DIFLUCAN) 200 MG tablet Take 1 tablet (200 mg) by mouth daily 30 tablet 3     LORazepam (ATIVAN) 1 MG tablet Take 1 tablet (1 mg) by mouth 2 times daily as needed for anxiety 60 tablet 0     magnesium oxide (MAG-OX) 400 (241.3 Mg) MG tablet Take 1 tablet (400 mg) by mouth 2 times daily 60 tablet 3     pantoprazole (PROTONIX) 40 MG EC tablet Take 1 tablet (40 mg) by mouth daily 60 tablet 2     sulfamethoxazole-trimethoprim (BACTRIM) 400-80 MG tablet Take 2 tablets by mouth Every Mon, Tues two times daily 32 tablet 1     tacrolimus (GENERIC EQUIVALENT) 1 MG capsule See Tacrolimus Suspension Rx for current dosing 60 capsule 3     tacrolimus (GENERIC EQUIVALENT) 1 mg/mL suspension Follow taper schedule 60 mL 3     valACYclovir (VALTREX) 1000 mg tablet Take 1 tablet (1,000 mg) by mouth 3 times daily 90 tablet 1       Allergies:   Allergies   Allergen Reactions     Blood Transfusion Related  (Informational Only) Other (See Comments)     Patient has a history of a clinically significant antibody against RBC antigens.  A delay in compatible RBCs may occur.Stem cell transplant patient.  Give type O RBCs.  Requires Benadryl and tylenol as premed for platelets and RBCs for hx of hives     Voriconazole Photosensitivity     Significant rash - do not rechallenge       Social History:  No smoke exposure  lives with parents   Social History     Socioeconomic History     Marital status: Single     Spouse name: Not on file     Number of children: Not on file     Years of education: Not on file     Highest education level: Not on file   Occupational History     Not on file   Social Needs     Financial resource strain: Not on file     Food insecurity     Worry: Not on file     Inability: Not on file     Transportation needs     Medical: Not on file     Non-medical: Not on file   Tobacco Use     Smoking status: Never Smoker     Smokeless tobacco: Never Used   Substance and Sexual Activity     Alcohol use: Never     Frequency: Never     Drug use: Never     Sexual activity: Not on file   Lifestyle     Physical activity     Days per week: Not on file     Minutes per session: Not on file     Stress: Not on file   Relationships     Social connections     Talks on phone: Not on file     Gets together: Not on file     Attends Mandaen service: Not on file     Active member of club or organization: Not on file     Attends meetings of clubs or organizations: Not on file     Relationship status: Not on file     Intimate partner violence     Fear of current or ex partner: Not on file     Emotionally abused: Not on file     Physically abused: Not on file     Forced sexual activity: Not on file   Other Topics Concern     Parent/sibling w/ CABG, MI or angioplasty before 65F 55M? Not Asked   Social History Narrative     Not on file       REVIEW OF SYSTEMS:  12 point ROS obtained and was negative other than the symptoms noted above  "in the HPI.    PHYSICAL EXAMINATION:  Temp 98.5  F (36.9  C) (Temporal)   Ht 5' 9\" (175.3 cm)   Wt 133 lb 4.8 oz (60.5 kg)   BMI 19.68 kg/m    GENERAL: NAD. Sitting comfortably in exam chair.    HEAD: normocephalic, atraumatic. Alopecia    EYES: EOMs intact. Sclera white    EARS: EACs of normal caliber with significant cerumen.  Right TM is intact. Some tympanosclerosis noted. No obvious effusion or retraction appreciated.  Left TM is intact. No obvious effusion or retraction appreciated.    NOSE: nasal septum is midline and stable. No drainage noted.    MOUTH: MMM. Lips are intact. No lesions noted. Tongue midline.    NECK: Supple, trachea midline. No significant lymphadenopathy noted.     RESP: Symmetric chest expansion. No respiratory distress.    Imaging reviewed: None    Laboratory reviewed: None    Audiology reviewed: N/A    Procedure: Cerumenectomy. A binocular microscope was used to examine ears bilaterally. A right angle pick and empty alligator was used to remove cerumen from ears bilaterally. He tolerated the procedure well and without issue.    Impressions and Recommendations:  Artemio is a 20 year old male with a history of ALL now day +80 s/p BMT.  He was recently treated for a right otitis media. Ear has healed well. Significant cerumen to ears bilaterally were easily debrided. Ears are healthy appearing. He may follow up as needed with ongoing concerns.      Thank you for allowing me to participate in the care of Artemio. Please don't hesitate to contact me.      SNOW Mercre, LE  Pediatric Otolaryngology and Facial Plastic Surgery  Department of Otolaryngology  [facility]   Clinic [phone]  [email or web address]    "

## 2021-05-07 ENCOUNTER — INFUSION THERAPY VISIT (OUTPATIENT)
Dept: INFUSION THERAPY | Facility: CLINIC | Age: 21
End: 2021-05-07
Attending: NURSE PRACTITIONER
Payer: COMMERCIAL

## 2021-05-07 ENCOUNTER — ONCOLOGY VISIT (OUTPATIENT)
Dept: TRANSPLANT | Facility: CLINIC | Age: 21
End: 2021-05-07
Attending: PEDIATRICS
Payer: COMMERCIAL

## 2021-05-07 VITALS
WEIGHT: 131.61 LBS | RESPIRATION RATE: 16 BRPM | HEART RATE: 92 BPM | HEIGHT: 69 IN | DIASTOLIC BLOOD PRESSURE: 67 MMHG | BODY MASS INDEX: 19.49 KG/M2 | TEMPERATURE: 98.5 F | OXYGEN SATURATION: 99 % | SYSTOLIC BLOOD PRESSURE: 102 MMHG

## 2021-05-07 DIAGNOSIS — Z94.81 S/P BONE MARROW TRANSPLANT (H): Primary | ICD-10-CM

## 2021-05-07 DIAGNOSIS — C91.01 ACUTE LYMPHOBLASTIC LEUKEMIA (ALL) IN REMISSION (H): ICD-10-CM

## 2021-05-07 LAB
ALBUMIN SERPL-MCNC: 3.4 G/DL (ref 3.4–5)
ALP SERPL-CCNC: 73 U/L (ref 40–150)
ALT SERPL W P-5'-P-CCNC: 120 U/L (ref 0–70)
ANION GAP SERPL CALCULATED.3IONS-SCNC: 5 MMOL/L (ref 3–14)
AST SERPL W P-5'-P-CCNC: 67 U/L (ref 0–45)
BASOPHILS # BLD AUTO: 0 10E9/L (ref 0–0.2)
BASOPHILS NFR BLD AUTO: 0.1 %
BILIRUB SERPL-MCNC: 0.4 MG/DL (ref 0.2–1.3)
BUN SERPL-MCNC: 8 MG/DL (ref 7–30)
CALCIUM SERPL-MCNC: 9 MG/DL (ref 8.5–10.1)
CHLORIDE SERPL-SCNC: 109 MMOL/L (ref 94–109)
CMV DNA SPEC NAA+PROBE-ACNC: NORMAL [IU]/ML
CMV DNA SPEC NAA+PROBE-LOG#: NORMAL {LOG_IU}/ML
CO2 SERPL-SCNC: 24 MMOL/L (ref 20–32)
CREAT SERPL-MCNC: 0.8 MG/DL (ref 0.66–1.25)
CREAT UR-MCNC: 348 MG/DL
DIFFERENTIAL METHOD BLD: ABNORMAL
EOSINOPHIL # BLD AUTO: 0.1 10E9/L (ref 0–0.7)
EOSINOPHIL NFR BLD AUTO: 2.1 %
ERYTHROCYTE [DISTWIDTH] IN BLOOD BY AUTOMATED COUNT: 18.9 % (ref 10–15)
GFR SERPL CREATININE-BSD FRML MDRD: >90 ML/MIN/{1.73_M2}
GLUCOSE SERPL-MCNC: 122 MG/DL (ref 70–99)
HCT VFR BLD AUTO: 38.4 % (ref 40–53)
HGB BLD-MCNC: 12.6 G/DL (ref 13.3–17.7)
IMM GRANULOCYTES # BLD: 0 10E9/L (ref 0–0.4)
IMM GRANULOCYTES NFR BLD: 0.6 %
LDH SERPL L TO P-CCNC: 192 U/L (ref 85–227)
LYMPHOCYTES # BLD AUTO: 2.4 10E9/L (ref 0.8–5.3)
LYMPHOCYTES NFR BLD AUTO: 36.5 %
MAGNESIUM SERPL-MCNC: 1.9 MG/DL (ref 1.6–2.3)
MCH RBC QN AUTO: 31.3 PG (ref 26.5–33)
MCHC RBC AUTO-ENTMCNC: 32.8 G/DL (ref 31.5–36.5)
MCV RBC AUTO: 96 FL (ref 78–100)
MONOCYTES # BLD AUTO: 1.2 10E9/L (ref 0–1.3)
MONOCYTES NFR BLD AUTO: 17.5 %
NEUTROPHILS # BLD AUTO: 2.9 10E9/L (ref 1.6–8.3)
NEUTROPHILS NFR BLD AUTO: 43.2 %
NRBC # BLD AUTO: 0 10*3/UL
NRBC BLD AUTO-RTO: 0 /100
PHOSPHATE SERPL-MCNC: 3.6 MG/DL (ref 2.5–4.5)
PLATELET # BLD AUTO: 115 10E9/L (ref 150–450)
POTASSIUM SERPL-SCNC: 3.8 MMOL/L (ref 3.4–5.3)
PROT SERPL-MCNC: 6.4 G/DL (ref 6.8–8.8)
PROT UR-MCNC: 0.41 G/L
PROT/CREAT 24H UR: 0.12 G/G CR (ref 0–0.2)
RBC # BLD AUTO: 4.02 10E12/L (ref 4.4–5.9)
SODIUM SERPL-SCNC: 138 MMOL/L (ref 133–144)
SPECIMEN SOURCE: NORMAL
WBC # BLD AUTO: 6.7 10E9/L (ref 4–11)

## 2021-05-07 PROCEDURE — 250N000011 HC RX IP 250 OP 636

## 2021-05-07 PROCEDURE — 80053 COMPREHEN METABOLIC PANEL: CPT | Performed by: NURSE PRACTITIONER

## 2021-05-07 PROCEDURE — 36591 DRAW BLOOD OFF VENOUS DEVICE: CPT

## 2021-05-07 PROCEDURE — 83615 LACTATE (LD) (LDH) ENZYME: CPT | Performed by: NURSE PRACTITIONER

## 2021-05-07 PROCEDURE — 96413 CHEMO IV INFUSION 1 HR: CPT

## 2021-05-07 PROCEDURE — 99215 OFFICE O/P EST HI 40 MIN: CPT | Mod: GC | Performed by: PEDIATRICS

## 2021-05-07 PROCEDURE — 84156 ASSAY OF PROTEIN URINE: CPT | Performed by: NURSE PRACTITIONER

## 2021-05-07 PROCEDURE — 87799 DETECT AGENT NOS DNA QUANT: CPT | Performed by: NURSE PRACTITIONER

## 2021-05-07 PROCEDURE — G0463 HOSPITAL OUTPT CLINIC VISIT: HCPCS

## 2021-05-07 PROCEDURE — 83735 ASSAY OF MAGNESIUM: CPT | Performed by: NURSE PRACTITIONER

## 2021-05-07 PROCEDURE — 85025 COMPLETE CBC W/AUTO DIFF WBC: CPT | Performed by: NURSE PRACTITIONER

## 2021-05-07 PROCEDURE — 84100 ASSAY OF PHOSPHORUS: CPT | Performed by: NURSE PRACTITIONER

## 2021-05-07 RX ORDER — HEPARIN SODIUM (PORCINE) LOCK FLUSH IV SOLN 100 UNIT/ML 100 UNIT/ML
SOLUTION INTRAVENOUS
Status: COMPLETED
Start: 2021-05-07 | End: 2021-05-07

## 2021-05-07 RX ORDER — HEPARIN SODIUM (PORCINE) LOCK FLUSH IV SOLN 100 UNIT/ML 100 UNIT/ML
5 SOLUTION INTRAVENOUS
Status: DISCONTINUED | OUTPATIENT
Start: 2021-05-07 | End: 2021-05-07 | Stop reason: HOSPADM

## 2021-05-07 RX ADMIN — HEPARIN SODIUM (PORCINE) LOCK FLUSH IV SOLN 100 UNIT/ML 500 UNITS: 100 SOLUTION at 11:13

## 2021-05-07 RX ADMIN — HEPARIN 500 UNITS: 100 SYRINGE at 11:13

## 2021-05-07 ASSESSMENT — PAIN SCALES - GENERAL: PAINLEVEL: NO PAIN (0)

## 2021-05-07 ASSESSMENT — MIFFLIN-ST. JEOR: SCORE: 1585.12

## 2021-05-07 NOTE — NURSING NOTE
"Chief Complaint   Patient presents with     RECHECK     Patient here today for Day 77     /67 (BP Location: Left arm, Patient Position: Sitting, Cuff Size: Adult Regular)   Pulse 92   Temp 98.5  F (36.9  C) (Oral)   Resp 16   Ht 1.749 m (5' 8.86\")   Wt 59.7 kg (131 lb 9.8 oz)   SpO2 99%   BMI 19.52 kg/m      No Pain (0)  Data Unavailable    I have reviewed the patients medication and allergy list.    Patient needs refills: no    Dressing change needed? No    EKG needed? No    Zhane Frost CMA  May 7, 2021  "

## 2021-05-07 NOTE — PROGRESS NOTES
Infusion Nursing Note    Artemio CELAYA Trung Presents to [facility] today for: Port Labs    Due to : Acute lymphoblastic leukemia (ALL) in remission (H)    Intravenous Access/Labs: Port accessed using sterile technique. Labs drawn as ordered. Port flushed with 10ml NS and then heparin locked and deaccessed.

## 2021-05-07 NOTE — PROGRESS NOTES
"May 7, 2021    Dr. Manjinder Paniagua  [address] [place] [address]    Name: Artemio Nino  MRN: [id number]  : 2000    Dear Dr. Paniagua,    We had the pleasure of seeing your patient Artemio Nino in the [facility] for routine post-BMT follow-up. 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 He presented today for a routine follow-up appointment.      Over the last 4 day Isael has experience waxing and waning diarrhea.  He describes it as small quantities ~6 times a day which also wakes him from sleep.    He has  no nausea though his stomach feels off and crampy.  He is still eating and drinking about the same amount maybe a little less.  No one else at home has had diarrhea. He has had no fever or vomiting, skin rash or changes. Last tapered his Tac on  and his is taking 0.2 mg BID.  He recently finished a course of Augmentin for an ear infection.   Over the last couple of days he has been staying in more because of the diarrhea and not been as active playing soccer.  He has not had a fever, chills, cough, or runny nose. He has not had any sick contacts.   Review of Systems: Pertinent positives include those mentioned in interval events. A complete review of systems was performed and is otherwise negative.      Medications:  Fluconazole 200 mg daily  Protonix 40 mg PO BID  Tacrolimus 0.2 and 0.2  Valtrex 1g PO TID  Ativan PRN  Mag Ox BID     Physical Exam:  /67 (BP Location: Left arm, Patient Position: Sitting, Cuff Size: Adult Regular)   Pulse 92   Temp 98.5  F (36.9  C) (Oral)   Resp 16   Ht 1.749 m (5' 8.86\")   Wt 59.7 kg (131 lb 9.8 oz)   SpO2 99%   BMI 19.52 kg/m     GEN: Sitting in chair, in no acute distress.Talkative with examiner, cooperative. Father present.   HEENT: Allopecia, NC/AT, Pupils equal and reactive, MMM - no sores, lesions, or actively " bleeding gums, nares patent.    CARD: RRR, no m/r/g, normal S1/S2. Port in place on R side of chest. Galvan site with bandage over it.  No issues.   RESP: CTAB, normal WOB, no adventitious sounds  ABD: Soft, NT/ND, flat, NABS  EXTREM: MAEE, WWP  SKIN: Dry, no rashes, no lesions  NEURO: Talkative, CNII-XII grossly intact    Labs:   Results for orders placed or performed in visit on 05/07/21   Lactate Dehydrogenase     Status: None   Result Value Ref Range    Lactate Dehydrogenase 192 85 - 227 U/L   Protein  random urine with Creat Ratio     Status: None   Result Value Ref Range    Protein Random Urine 0.41 g/L    Protein Total Urine g/gr Creatinine 0.12 0 - 0.2 g/g Cr   Phosphorus     Status: None   Result Value Ref Range    Phosphorus 3.6 2.5 - 4.5 mg/dL   Magnesium     Status: None   Result Value Ref Range    Magnesium 1.9 1.6 - 2.3 mg/dL   Comprehensive metabolic panel     Status: Abnormal   Result Value Ref Range    Sodium 138 133 - 144 mmol/L    Potassium 3.8 3.4 - 5.3 mmol/L    Chloride 109 94 - 109 mmol/L    Carbon Dioxide 24 20 - 32 mmol/L    Anion Gap 5 3 - 14 mmol/L    Glucose 122 (H) 70 - 99 mg/dL    Urea Nitrogen 8 7 - 30 mg/dL    Creatinine 0.80 0.66 - 1.25 mg/dL    GFR Estimate >90 >60 mL/min/[1.73_m2]    GFR Estimate If Black >90 >60 mL/min/[1.73_m2]    Calcium 9.0 8.5 - 10.1 mg/dL    Bilirubin Total 0.4 0.2 - 1.3 mg/dL    Albumin 3.4 3.4 - 5.0 g/dL    Protein Total 6.4 (L) 6.8 - 8.8 g/dL    Alkaline Phosphatase 73 40 - 150 U/L     (H) 0 - 70 U/L    AST 67 (H) 0 - 45 U/L   CBC with platelets differential     Status: Abnormal   Result Value Ref Range    WBC 6.7 4.0 - 11.0 10e9/L    RBC Count 4.02 (L) 4.4 - 5.9 10e12/L    Hemoglobin 12.6 (L) 13.3 - 17.7 g/dL    Hematocrit 38.4 (L) 40.0 - 53.0 %    MCV 96 78 - 100 fl    MCH 31.3 26.5 - 33.0 pg    MCHC 32.8 31.5 - 36.5 g/dL    RDW 18.9 (H) 10.0 - 15.0 %    Platelet Count 115 (L) 150 - 450 10e9/L    Diff Method Automated Method     % Neutrophils  43.2 %    % Lymphocytes 36.5 %    % Monocytes 17.5 %    % Eosinophils 2.1 %    % Basophils 0.1 %    % Immature Granulocytes 0.6 %    Nucleated RBCs 0 0 /100    Absolute Neutrophil 2.9 1.6 - 8.3 10e9/L    Absolute Lymphocytes 2.4 0.8 - 5.3 10e9/L    Absolute Monocytes 1.2 0.0 - 1.3 10e9/L    Absolute Eosinophils 0.1 0.0 - 0.7 10e9/L    Absolute Basophils 0.0 0.0 - 0.2 10e9/L    Abs Immature Granulocytes 0.0 0 - 0.4 10e9/L    Absolute Nucleated RBC 0.0    Creatinine urine calculation only     Status: None   Result Value Ref Range    Creatinine Urine 348 mg/dL     Assessment/Plan:  Artemio is a 19 yo male with 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 His post-transplant complications have included C.difficile.  Since finishing his Augmentin Isael has had diarrhea this may be a side effect of the Augmentin vs C Diff which Isael has had a history of.  Today he will work on obtaining a sample for stool culture and C Diff testing.      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 Of note, his initial post CAR-T therapy was complicated by Grade III CRS necessitating Tociluzimab x3, dopamine pressor support, and steroids. No CRS, neurotoxicity or infectious complications from [place] CAR-T. He received preparative regimen per MT2013-09 with Thiotepa, Fludarabine, Busulfan, and ATG and received a UCBT on 2/16/21. He engrafted on day +20. His Day +21 peripheral VNTRs (3/9) and bone marrow chimerism (3/11) reveals 100% donor chimerism and his flow, morphology, and NGS is negative. FISH negative for ETV6 and JAK2.     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 Continue Tacrolimus 0.4 mg am and 0.3 mg PM, level  3.9 today. We will plan to taper his Tacrolimus based off of his level today despite it being subtherapeutic.     - Diarrhea history not consistent with GVHD     FEN/Renal:   Regular diet as tolerated.      Cardiovascular: History of asymptomatic WPW syndrome (diagnosed 2018): no interventions to date. Most recent EKG (1/27). ECHO (1/26) normal, EF 64%. 24h Zio patch holter (1/27-1/28) revealed c/w WPW with no ventricular ectopies present. Cardiology following.Follow up ECHO in 6 mos (7/2021). Normotensive.     Heme: Transfuse for hemoglobin < 8 (experienced mild dizziness and malaise when hgb in 7s); platelet < 10K. Hx of transfusion reactions (hives): Premedicate with tylenol and benadryl prior to pRBCs and plts. S/p GCSF daily until ANC >/= 2500 x 3 consecutive days completed 3/17. Now prn for ANC < 1000.      Infectious Disease: History of C.Difficile colitis (2/17); treated with PO Vanc (2/17-2/26) and (3/5-3/15). Diarrhea improved. Continue Viral prophylaxis (CMV/HSV sero positive): HD Acyclovir. Weekly CMV neg 4/30. Continue Fungal prophylaxis with fluconazole.  Also on bactrim.     Immune: No history of hypogammaglobinemia: IgG level 839 (3/17), continue checking q2 weeks and replace if <400 (no hx replacement at [facility]). Does not require IVIG.     GI: Continue Protonix BID.Continue ativan, zofran and benadryl PRN for nausea.  -Diarrhea history no consistent with GVHD.  Stool cultures and C Diff testing.       Neuro: No longer utilizing oxycodone. L shoulder pain completely resolved. Of note, history of significant spinal headaches; consider using Candelario needle with lumbar punctures.      Fertility: Sperm collected previously for fertility preservation.    Disposition: RTC 5/14 for exam, labs          Written by Ju Christian MD   Pediatric Bone Marrow Transplant Fellow     Viky Zavala MD, PhD    Pediatric Blood and Marrow Transplant  [facility]  [facility], Viky Zavala MD PhD, saw this patient with the fellow and agree with the fellow s findings and plan of care as documented in the fellow s note.    Total time spent on the following services on the date of the encounter: 45 minutes  Preparing to see patient, chart review, review of outside records, ordering medications, test, procedures, interpretation of labs, imaging and other tests, counseling and educating the patient/family/caregiver, documenting clinical information in the electronic or other health record, and communicating results to the patient/family/caregiver.

## 2021-05-09 DIAGNOSIS — Z11.59 ENCOUNTER FOR SCREENING FOR OTHER VIRAL DISEASES: ICD-10-CM

## 2021-05-10 DIAGNOSIS — C91.01 ACUTE LYMPHOBLASTIC LEUKEMIA (ALL) IN REMISSION (H): ICD-10-CM

## 2021-05-10 LAB
EBV DNA # SPEC NAA+PROBE: NORMAL {COPIES}/ML
EBV DNA SPEC NAA+PROBE-LOG#: NORMAL {LOG_COPIES}/ML
HADV DNA # SPEC NAA+PROBE: NORMAL COPIES/ML
HADV DNA SPEC NAA+PROBE-LOG#: NORMAL LOG COPIES/ML
SPECIMEN SOURCE: NORMAL

## 2021-05-10 RX ORDER — VALACYCLOVIR HYDROCHLORIDE 1 G/1
1000 TABLET, FILM COATED ORAL 3 TIMES DAILY
Qty: 90 TABLET | Refills: 3 | Status: SHIPPED | OUTPATIENT
Start: 2021-05-10 | End: 2021-05-13

## 2021-05-13 ENCOUNTER — VIRTUAL VISIT (OUTPATIENT)
Dept: TRANSPLANT | Facility: CLINIC | Age: 21
End: 2021-05-13
Attending: PHYSICIAN ASSISTANT
Payer: COMMERCIAL

## 2021-05-13 DIAGNOSIS — C91.01 ACUTE LYMPHOBLASTIC LEUKEMIA (ALL) IN REMISSION (H): ICD-10-CM

## 2021-05-13 DIAGNOSIS — C91.01 ACUTE LYMPHOBLASTIC LEUKEMIA (ALL) IN REMISSION (H): Primary | ICD-10-CM

## 2021-05-13 PROCEDURE — 99212 OFFICE O/P EST SF 10 MIN: CPT | Mod: 95 | Performed by: PHYSICIAN ASSISTANT

## 2021-05-13 RX ORDER — VALACYCLOVIR HYDROCHLORIDE 1 G/1
1000 TABLET, FILM COATED ORAL 3 TIMES DAILY
Qty: 90 TABLET | Refills: 3 | Status: SHIPPED | OUTPATIENT
Start: 2021-05-13 | End: 2021-05-14

## 2021-05-13 NOTE — PROGRESS NOTES
Received a call from Isael Nino's step-mother Camryn that Isael is out of Valtrex and that the [facility] pharmacy in [place] cannot refill more than a 10 day supply without an insurance prior authorization. The pharmacy confirmed this information by phone and fax.  After discussion with his PCP Viky Zavala MD it was decided that Valtrex prophylaxis is no longer necessary given that he is day +86 from transplant and he remains CMV PCR negative. Camryn and the pharmacy were contacted by phone and were informed that Valtrex was discontinued.     Candido Han PA-C  Pediatric Blood and Marrow Transplant Program  [facility]    I spent a total of 15 minutes with the step-mother of Artemio Nino on the date of encounter with phone calls and doing chart review, review of labs/imaging, discussion with the family, documentation and further activities as noted above.

## 2021-05-14 ENCOUNTER — INFUSION THERAPY VISIT (OUTPATIENT)
Dept: INFUSION THERAPY | Facility: CLINIC | Age: 21
End: 2021-05-14
Attending: NURSE PRACTITIONER
Payer: COMMERCIAL

## 2021-05-14 ENCOUNTER — ONCOLOGY VISIT (OUTPATIENT)
Dept: TRANSPLANT | Facility: CLINIC | Age: 21
End: 2021-05-14
Attending: PEDIATRICS
Payer: COMMERCIAL

## 2021-05-14 VITALS
DIASTOLIC BLOOD PRESSURE: 70 MMHG | SYSTOLIC BLOOD PRESSURE: 102 MMHG | HEIGHT: 69 IN | HEART RATE: 93 BPM | RESPIRATION RATE: 18 BRPM | OXYGEN SATURATION: 98 % | WEIGHT: 127.87 LBS | TEMPERATURE: 99.3 F | BODY MASS INDEX: 18.94 KG/M2

## 2021-05-14 DIAGNOSIS — C91.01 ACUTE LYMPHOBLASTIC LEUKEMIA (ALL) IN REMISSION (H): ICD-10-CM

## 2021-05-14 DIAGNOSIS — C91.02 ACUTE LYMPHOBLASTIC LEUKEMIA (ALL) IN RELAPSE (H): Primary | ICD-10-CM

## 2021-05-14 DIAGNOSIS — Z94.81 STATUS POST BONE MARROW TRANSPLANT (H): ICD-10-CM

## 2021-05-14 DIAGNOSIS — Z94.81 BONE MARROW TRANSPLANT STATUS (H): Primary | ICD-10-CM

## 2021-05-14 LAB
ALBUMIN SERPL-MCNC: 3.4 G/DL (ref 3.4–5)
ALP SERPL-CCNC: 69 U/L (ref 40–150)
ALT SERPL W P-5'-P-CCNC: 110 U/L (ref 0–70)
ANION GAP SERPL CALCULATED.3IONS-SCNC: 8 MMOL/L (ref 3–14)
AST SERPL W P-5'-P-CCNC: 76 U/L (ref 0–45)
BASOPHILS # BLD AUTO: 0 10E9/L (ref 0–0.2)
BASOPHILS NFR BLD AUTO: 0.2 %
BILIRUB SERPL-MCNC: 0.6 MG/DL (ref 0.2–1.3)
BUN SERPL-MCNC: 10 MG/DL (ref 7–30)
CALCIUM SERPL-MCNC: 9 MG/DL (ref 8.5–10.1)
CHLORIDE SERPL-SCNC: 108 MMOL/L (ref 94–109)
CMV DNA SPEC NAA+PROBE-ACNC: NORMAL [IU]/ML
CMV DNA SPEC NAA+PROBE-LOG#: NORMAL {LOG_IU}/ML
CO2 SERPL-SCNC: 22 MMOL/L (ref 20–32)
CREAT SERPL-MCNC: 0.86 MG/DL (ref 0.66–1.25)
DIFFERENTIAL METHOD BLD: ABNORMAL
EOSINOPHIL # BLD AUTO: 0.2 10E9/L (ref 0–0.7)
EOSINOPHIL NFR BLD AUTO: 2.1 %
ERYTHROCYTE [DISTWIDTH] IN BLOOD BY AUTOMATED COUNT: 18.4 % (ref 10–15)
GFR SERPL CREATININE-BSD FRML MDRD: >90 ML/MIN/{1.73_M2}
GLUCOSE SERPL-MCNC: 117 MG/DL (ref 70–99)
HCT VFR BLD AUTO: 40.1 % (ref 40–53)
HGB BLD-MCNC: 13.2 G/DL (ref 13.3–17.7)
IMM GRANULOCYTES # BLD: 0.1 10E9/L (ref 0–0.4)
IMM GRANULOCYTES NFR BLD: 0.6 %
LYMPHOCYTES # BLD AUTO: 2.5 10E9/L (ref 0.8–5.3)
LYMPHOCYTES NFR BLD AUTO: 29.8 %
MCH RBC QN AUTO: 31.4 PG (ref 26.5–33)
MCHC RBC AUTO-ENTMCNC: 32.9 G/DL (ref 31.5–36.5)
MCV RBC AUTO: 96 FL (ref 78–100)
MONOCYTES # BLD AUTO: 1.6 10E9/L (ref 0–1.3)
MONOCYTES NFR BLD AUTO: 19.4 %
NEUTROPHILS # BLD AUTO: 4 10E9/L (ref 1.6–8.3)
NEUTROPHILS NFR BLD AUTO: 47.9 %
NRBC # BLD AUTO: 0 10*3/UL
NRBC BLD AUTO-RTO: 0 /100
PLATELET # BLD AUTO: 153 10E9/L (ref 150–450)
POTASSIUM SERPL-SCNC: 4 MMOL/L (ref 3.4–5.3)
PROT SERPL-MCNC: 6.5 G/DL (ref 6.8–8.8)
RBC # BLD AUTO: 4.2 10E12/L (ref 4.4–5.9)
SODIUM SERPL-SCNC: 138 MMOL/L (ref 133–144)
SPECIMEN SOURCE: NORMAL
WBC # BLD AUTO: 8.3 10E9/L (ref 4–11)

## 2021-05-14 PROCEDURE — 99215 OFFICE O/P EST HI 40 MIN: CPT | Mod: GC | Performed by: PEDIATRICS

## 2021-05-14 PROCEDURE — 250N000011 HC RX IP 250 OP 636

## 2021-05-14 PROCEDURE — 36591 DRAW BLOOD OFF VENOUS DEVICE: CPT

## 2021-05-14 PROCEDURE — G0463 HOSPITAL OUTPT CLINIC VISIT: HCPCS

## 2021-05-14 PROCEDURE — 80053 COMPREHEN METABOLIC PANEL: CPT | Performed by: NURSE PRACTITIONER

## 2021-05-14 PROCEDURE — 85025 COMPLETE CBC W/AUTO DIFF WBC: CPT | Performed by: NURSE PRACTITIONER

## 2021-05-14 RX ORDER — DRONABINOL 2.5 MG/1
2.5 CAPSULE ORAL
Qty: 60 CAPSULE | Refills: 1 | Status: SHIPPED | OUTPATIENT
Start: 2021-05-14 | End: 2022-02-08

## 2021-05-14 RX ORDER — DRONABINOL 2.5 MG/1
2.5 CAPSULE ORAL
Qty: 60 CAPSULE | Refills: 1 | Status: SHIPPED | OUTPATIENT
Start: 2021-05-14 | End: 2021-05-14

## 2021-05-14 RX ORDER — HEPARIN SODIUM (PORCINE) LOCK FLUSH IV SOLN 100 UNIT/ML 100 UNIT/ML
5 SOLUTION INTRAVENOUS
Status: DISCONTINUED | OUTPATIENT
Start: 2021-05-14 | End: 2021-05-14 | Stop reason: HOSPADM

## 2021-05-14 RX ORDER — HEPARIN SODIUM (PORCINE) LOCK FLUSH IV SOLN 100 UNIT/ML 100 UNIT/ML
SOLUTION INTRAVENOUS
Status: COMPLETED
Start: 2021-05-14 | End: 2021-05-14

## 2021-05-14 RX ADMIN — HEPARIN SODIUM (PORCINE) LOCK FLUSH IV SOLN 100 UNIT/ML 500 UNITS: 100 SOLUTION at 09:19

## 2021-05-14 RX ADMIN — HEPARIN 500 UNITS: 100 SYRINGE at 09:19

## 2021-05-14 ASSESSMENT — PAIN SCALES - GENERAL: PAINLEVEL: NO PAIN (0)

## 2021-05-14 ASSESSMENT — MIFFLIN-ST. JEOR: SCORE: 1570.63

## 2021-05-14 NOTE — PROGRESS NOTES
Infusion Nursing Note    Artemio Nino Presents to [facility] today for: Port labs     Due to :    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)  Acute lymphoblastic leukemia (ALL) in remission (H)    Intravenous Access/Labs: Port accessed without difficulty. Labs drawn as ordered. Port heparin locked and de-accessed.

## 2021-05-14 NOTE — PROGRESS NOTES
"May 14, 2021    Dr. Manjinder Paniagua  [address] [place] [address]    Name: Artemio Nino  MRN: [id number]  : 2000    Dear Dr. Paniagua,    We had the pleasure of seeing your patient Artemio Nino in the [facility] for routine post-BMT follow-up. 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 He presented today for a routine follow-up appointment.      Last week Artemio's diarrhea resolved he is now stooling 1-2 times daily and it is soft.  He continues to have a very poor appetite with weight loss.  He is drinking well primarily water.  He has had no vomiting, skin rash or changes. He continues on a Tacrolimus taper.  He has been more active with friends this week as he is feeling better.  He has not had a fever, chills, cough, or runny nose. He has not had any sick contacts.   Review of Systems: Pertinent positives include those mentioned in interval events. A complete review of systems was performed and is otherwise negative.      Medications:  Fluconazole 200 mg daily  Protonix 40 mg PO BID  Tacrolimus 0.2 and 0.2  Ativan PRN  Mag Ox BID     Physical Exam:  /70 (BP Location: Right arm, Patient Position: Sitting, Cuff Size: Adult Regular)   Pulse 93   Temp 99.3  F (37.4  C) (Oral)   Resp 18   Ht 1.753 m (5' 9.02\")   Wt 58 kg (127 lb 13.9 oz)   SpO2 98%   BMI 18.87 kg/m     GEN: Sitting in chair, in no acute distress.Talkative with examiner, cooperative. Father present.   HEENT: Allopecia, NC/AT, Pupils equal and reactive, MMM - no sores, lesions, or actively bleeding gums, nares patent.    CARD: RRR, no m/r/g, normal S1/S2. Port in place on R side of chest. Galvan site with bandage over it.  No issues.   RESP: CTAB, normal WOB, no adventitious sounds  ABD: Soft, NT/ND, flat, NABS  EXTREM: MAEE, WWP  SKIN: Dry, no rashes, no lesions  NEURO: Talkative, CNII-XII " grossly intact    Labs:   Results for orders placed or performed in visit on 05/14/21   CBC with platelets differential     Status: Abnormal   Result Value Ref Range    WBC 8.3 4.0 - 11.0 10e9/L    RBC Count 4.20 (L) 4.4 - 5.9 10e12/L    Hemoglobin 13.2 (L) 13.3 - 17.7 g/dL    Hematocrit 40.1 40.0 - 53.0 %    MCV 96 78 - 100 fl    MCH 31.4 26.5 - 33.0 pg    MCHC 32.9 31.5 - 36.5 g/dL    RDW 18.4 (H) 10.0 - 15.0 %    Platelet Count 153 150 - 450 10e9/L    Diff Method Automated Method     % Neutrophils 47.9 %    % Lymphocytes 29.8 %    % Monocytes 19.4 %    % Eosinophils 2.1 %    % Basophils 0.2 %    % Immature Granulocytes 0.6 %    Nucleated RBCs 0 0 /100    Absolute Neutrophil 4.0 1.6 - 8.3 10e9/L    Absolute Lymphocytes 2.5 0.8 - 5.3 10e9/L    Absolute Monocytes 1.6 (H) 0.0 - 1.3 10e9/L    Absolute Eosinophils 0.2 0.0 - 0.7 10e9/L    Absolute Basophils 0.0 0.0 - 0.2 10e9/L    Abs Immature Granulocytes 0.1 0 - 0.4 10e9/L    Absolute Nucleated RBC 0.0    Comprehensive metabolic panel     Status: Abnormal   Result Value Ref Range    Sodium 138 133 - 144 mmol/L    Potassium 4.0 3.4 - 5.3 mmol/L    Chloride 108 94 - 109 mmol/L    Carbon Dioxide 22 20 - 32 mmol/L    Anion Gap 8 3 - 14 mmol/L    Glucose 117 (H) 70 - 99 mg/dL    Urea Nitrogen 10 7 - 30 mg/dL    Creatinine 0.86 0.66 - 1.25 mg/dL    GFR Estimate >90 >60 mL/min/[1.73_m2]    GFR Estimate If Black >90 >60 mL/min/[1.73_m2]    Calcium 9.0 8.5 - 10.1 mg/dL    Bilirubin Total 0.6 0.2 - 1.3 mg/dL    Albumin 3.4 3.4 - 5.0 g/dL    Protein Total 6.5 (L) 6.8 - 8.8 g/dL    Alkaline Phosphatase 69 40 - 150 U/L     (H) 0 - 70 U/L    AST 76 (H) 0 - 45 U/L     Assessment/Plan:    Artemio is a 19 yo male with 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 His post-transplant complications have included C.difficile.  Today he is doing well and we will stop his infectious prophylactic medications including Valtrex, and fluconazole.  We  will also wean his Mag to once daily.  For his decreased appetite we have started him on Marinol this week 2.5 mg BID and will Follow-up up with him next week.    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 Of note, his initial post CAR-T therapy was complicated by Grade III CRS necessitating Tociluzimab x3, dopamine pressor support, and steroids. No CRS, neurotoxicity or infectious complications from [facility] CAR-T. He received preparative regimen per MT2013-09 with Thiotepa, Fludarabine, Busulfan, and ATG and received a UCBT on 2/16/21. He engrafted on day +20. His Day +21 peripheral VNTRs (3/9) and bone marrow chimerism (3/11) reveals 100% donor chimerism and his flow, morphology, and NGS is negative. FISH negative for ETV6 and JAK2.     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  Undergoing Tac Wean without signs of GVHD     FEN/Renal:   Regular diet as tolerated.  He has lost weight.  He says he feels full quickly  We will Start Marinol 2.5 BID.     Cardiovascular: History of asymptomatic WPW syndrome (diagnosed 2018): no interventions to date.  Cardiology following.Follow up ECHO in 6 mos (7/2021). Normotensive.     Heme:  Engrafted.      Infectious Disease:  Stop fluconazole and valtrex today.  Continue bactrim.  He has a history of C. Diff.      GI: Continue Protonix BID.Continue ativan, zofran and benadryl PRN for nausea.  -Diarrhea improved this week shortly after visit last Friday.  No stool sample was returned.     Neuro: No longer utilizing oxycodone. L shoulder pain completely resolved. Of note, history of significant spinal headaches; consider using Candelario needle with lumbar punctures.      Fertility: Sperm collected  previously for fertility preservation.    Disposition: RTC in 1 week for exam, labs and visit with Dr. Solomon as this is my last day at the [facility].  He will have his day 100 studies done in 2 weeks and then [facility]  He will need monthly CBCs.  Thank you so much for letting me participate in Artemio's care.      Written by Ju Christian MD   Pediatric Bone Marrow Transplant Fellow     Viky Zavala MD, PhD    Pediatric Blood and Marrow Transplant  [facility]    Total time spent on the following services on the date of the encounter: 45 minutes  Preparing to see patient, chart review, review of outside records, ordering medications, test, procedures, interpretation of labs, imaging and other tests, counseling and educating the patient/family/caregiver, documenting clinical information in the electronic or other health record, and communicating results to the patient/family/caregiver.      I, Viky Zavala MD PhD, saw this patient with the fellow and agree with the fellow s findings and plan of care as documented in the fellow s note.

## 2021-05-14 NOTE — NURSING NOTE
"Chief Complaint   Patient presents with     RECHECK     Patient here today for Anniversary Visit Day 84     /70 (BP Location: Right arm, Patient Position: Sitting, Cuff Size: Adult Regular)   Pulse 93   Temp 99.3  F (37.4  C) (Oral)   Resp 18   Ht 1.753 m (5' 9.02\")   Wt 58 kg (127 lb 13.9 oz)   SpO2 98%   BMI 18.87 kg/m      No Pain (0)  Data Unavailable    I have reviewed the patients medication and allergy list.    Patient needs refills: no    Dressing change needed? No    EKG needed? No    Zhane Frost CMA  May 14, 2021  "

## 2021-05-19 ENCOUNTER — ONCOLOGY VISIT (OUTPATIENT)
Dept: TRANSPLANT | Facility: CLINIC | Age: 21
End: 2021-05-19
Attending: PEDIATRICS
Payer: COMMERCIAL

## 2021-05-19 ENCOUNTER — TELEPHONE (OUTPATIENT)
Dept: ONCOLOGY | Facility: CLINIC | Age: 21
End: 2021-05-19

## 2021-05-19 ENCOUNTER — INFUSION THERAPY VISIT (OUTPATIENT)
Dept: INFUSION THERAPY | Facility: CLINIC | Age: 21
End: 2021-05-19
Attending: NURSE PRACTITIONER
Payer: COMMERCIAL

## 2021-05-19 VITALS
RESPIRATION RATE: 18 BRPM | DIASTOLIC BLOOD PRESSURE: 72 MMHG | OXYGEN SATURATION: 98 % | HEIGHT: 69 IN | TEMPERATURE: 99.2 F | HEART RATE: 109 BPM | BODY MASS INDEX: 19.07 KG/M2 | SYSTOLIC BLOOD PRESSURE: 106 MMHG | WEIGHT: 128.75 LBS

## 2021-05-19 DIAGNOSIS — C91.02 ACUTE LYMPHOBLASTIC LEUKEMIA (ALL) IN RELAPSE (H): ICD-10-CM

## 2021-05-19 DIAGNOSIS — C91.02 ACUTE LYMPHOBLASTIC LEUKEMIA (ALL) IN RELAPSE (H): Primary | ICD-10-CM

## 2021-05-19 DIAGNOSIS — C91.01 ACUTE LYMPHOBLASTIC LEUKEMIA (ALL) IN REMISSION (H): Primary | ICD-10-CM

## 2021-05-19 DIAGNOSIS — Z91.89 AT RISK FOR INFECTION: ICD-10-CM

## 2021-05-19 DIAGNOSIS — C91.01 ACUTE LYMPHOBLASTIC LEUKEMIA (ALL) IN REMISSION (H): ICD-10-CM

## 2021-05-19 LAB
ALBUMIN SERPL-MCNC: 3.5 G/DL (ref 3.4–5)
ALP SERPL-CCNC: 69 U/L (ref 40–150)
ALT SERPL W P-5'-P-CCNC: 101 U/L (ref 0–70)
ANION GAP SERPL CALCULATED.3IONS-SCNC: 6 MMOL/L (ref 3–14)
AST SERPL W P-5'-P-CCNC: 66 U/L (ref 0–45)
BASOPHILS # BLD AUTO: 0 10E9/L (ref 0–0.2)
BASOPHILS NFR BLD AUTO: 0.2 %
BILIRUB SERPL-MCNC: 0.6 MG/DL (ref 0.2–1.3)
BUN SERPL-MCNC: 16 MG/DL (ref 7–30)
CALCIUM SERPL-MCNC: 9.3 MG/DL (ref 8.5–10.1)
CD19 CELLS # BLD: 2150 CELLS/UL (ref 107–698)
CD19 CELLS NFR BLD: 78 % (ref 6–27)
CD3 CELLS # BLD: 10 CELLS/UL (ref 603–2990)
CD3 CELLS NFR BLD: <1 % (ref 49–84)
CD3+CD4+ CELLS # BLD: 1 CELLS/UL (ref 441–2156)
CD3+CD4+ CELLS NFR BLD: <1 % (ref 28–63)
CD3+CD8+ CELLS # BLD: 9 CELLS/UL (ref 125–1312)
CD3+CD8+ CELLS NFR BLD: <1 % (ref 10–40)
CD3-CD16+CD56+ CELLS # BLD: 557 CELLS/UL (ref 95–640)
CD3-CD16+CD56+ CELLS NFR BLD: 20 % (ref 4–25)
CHLORIDE SERPL-SCNC: 109 MMOL/L (ref 94–109)
CO2 SERPL-SCNC: 25 MMOL/L (ref 20–32)
CREAT SERPL-MCNC: 0.84 MG/DL (ref 0.66–1.25)
DIFFERENTIAL METHOD BLD: ABNORMAL
EOSINOPHIL # BLD AUTO: 0 10E9/L (ref 0–0.7)
EOSINOPHIL NFR BLD AUTO: 0.3 %
ERYTHROCYTE [DISTWIDTH] IN BLOOD BY AUTOMATED COUNT: 17.6 % (ref 10–15)
GFR SERPL CREATININE-BSD FRML MDRD: >90 ML/MIN/{1.73_M2}
GLUCOSE SERPL-MCNC: 110 MG/DL (ref 70–99)
HCT VFR BLD AUTO: 38.5 % (ref 40–53)
HGB BLD-MCNC: 12.8 G/DL (ref 13.3–17.7)
IFC SPECIMEN: ABNORMAL
IMM GRANULOCYTES # BLD: 0 10E9/L (ref 0–0.4)
IMM GRANULOCYTES NFR BLD: 0.3 %
LABORATORY COMMENT REPORT: NORMAL
LYMPHOCYTES # BLD AUTO: 2.5 10E9/L (ref 0.8–5.3)
LYMPHOCYTES NFR BLD AUTO: 39.5 %
MAGNESIUM SERPL-MCNC: 2.3 MG/DL (ref 1.6–2.3)
MCH RBC QN AUTO: 31.3 PG (ref 26.5–33)
MCHC RBC AUTO-ENTMCNC: 33.2 G/DL (ref 31.5–36.5)
MCV RBC AUTO: 94 FL (ref 78–100)
MONOCYTES # BLD AUTO: 1 10E9/L (ref 0–1.3)
MONOCYTES NFR BLD AUTO: 16.6 %
NEUTROPHILS # BLD AUTO: 2.7 10E9/L (ref 1.6–8.3)
NEUTROPHILS NFR BLD AUTO: 43.1 %
NRBC # BLD AUTO: 0 10*3/UL
NRBC BLD AUTO-RTO: 0 /100
PHOSPHATE SERPL-MCNC: 3.2 MG/DL (ref 2.5–4.5)
PLATELET # BLD AUTO: 172 10E9/L (ref 150–450)
POTASSIUM SERPL-SCNC: 3.5 MMOL/L (ref 3.4–5.3)
PROT SERPL-MCNC: 6.4 G/DL (ref 6.8–8.8)
RBC # BLD AUTO: 4.09 10E12/L (ref 4.4–5.9)
SARS-COV-2 RNA RESP QL NAA+PROBE: NEGATIVE
SARS-COV-2 RNA RESP QL NAA+PROBE: NORMAL
SODIUM SERPL-SCNC: 140 MMOL/L (ref 133–144)
SPECIMEN SOURCE: NORMAL
SPECIMEN SOURCE: NORMAL
WBC # BLD AUTO: 6.2 10E9/L (ref 4–11)

## 2021-05-19 PROCEDURE — 99215 OFFICE O/P EST HI 40 MIN: CPT | Mod: GC | Performed by: PEDIATRICS

## 2021-05-19 PROCEDURE — 82784 ASSAY IGA/IGD/IGG/IGM EACH: CPT | Performed by: NURSE PRACTITIONER

## 2021-05-19 PROCEDURE — 36591 DRAW BLOOD OFF VENOUS DEVICE: CPT

## 2021-05-19 PROCEDURE — G0452 MOLECULAR PATHOLOGY INTERPR: HCPCS | Mod: 26 | Performed by: PATHOLOGY

## 2021-05-19 PROCEDURE — 81268 CHIMERISM ANAL W/CELL SELECT: CPT | Performed by: NURSE PRACTITIONER

## 2021-05-19 PROCEDURE — 84100 ASSAY OF PHOSPHORUS: CPT | Performed by: NURSE PRACTITIONER

## 2021-05-19 PROCEDURE — 250N000011 HC RX IP 250 OP 636

## 2021-05-19 PROCEDURE — 85025 COMPLETE CBC W/AUTO DIFF WBC: CPT | Performed by: NURSE PRACTITIONER

## 2021-05-19 PROCEDURE — 86359 T CELLS TOTAL COUNT: CPT | Performed by: NURSE PRACTITIONER

## 2021-05-19 PROCEDURE — U0005 INFEC AGEN DETEC AMPLI PROBE: HCPCS | Performed by: PEDIATRICS

## 2021-05-19 PROCEDURE — 86357 NK CELLS TOTAL COUNT: CPT | Performed by: NURSE PRACTITIONER

## 2021-05-19 PROCEDURE — 86360 T CELL ABSOLUTE COUNT/RATIO: CPT | Performed by: NURSE PRACTITIONER

## 2021-05-19 PROCEDURE — 86355 B CELLS TOTAL COUNT: CPT | Performed by: NURSE PRACTITIONER

## 2021-05-19 PROCEDURE — U0003 INFECTIOUS AGENT DETECTION BY NUCLEIC ACID (DNA OR RNA); SEVERE ACUTE RESPIRATORY SYNDROME CORONAVIRUS 2 (SARS-COV-2) (CORONAVIRUS DISEASE [COVID-19]), AMPLIFIED PROBE TECHNIQUE, MAKING USE OF HIGH THROUGHPUT TECHNOLOGIES AS DESCRIBED BY CMS-2020-01-R: HCPCS | Performed by: PEDIATRICS

## 2021-05-19 PROCEDURE — 80053 COMPREHEN METABOLIC PANEL: CPT | Performed by: NURSE PRACTITIONER

## 2021-05-19 PROCEDURE — 81268 CHIMERISM ANAL W/CELL SELECT: CPT | Performed by: PEDIATRICS

## 2021-05-19 PROCEDURE — 83735 ASSAY OF MAGNESIUM: CPT | Performed by: NURSE PRACTITIONER

## 2021-05-19 RX ORDER — HEPARIN SODIUM (PORCINE) LOCK FLUSH IV SOLN 100 UNIT/ML 100 UNIT/ML
SOLUTION INTRAVENOUS
Status: COMPLETED
Start: 2021-05-19 | End: 2021-05-19

## 2021-05-19 RX ORDER — SULFAMETHOXAZOLE AND TRIMETHOPRIM 400; 80 MG/1; MG/1
2 TABLET ORAL
Qty: 32 TABLET | Refills: 1 | Status: SHIPPED | OUTPATIENT
Start: 2021-05-24

## 2021-05-19 RX ORDER — HEPARIN SODIUM (PORCINE) LOCK FLUSH IV SOLN 100 UNIT/ML 100 UNIT/ML
5 SOLUTION INTRAVENOUS
Status: DISCONTINUED | OUTPATIENT
Start: 2021-05-19 | End: 2021-05-19 | Stop reason: HOSPADM

## 2021-05-19 RX ADMIN — HEPARIN SODIUM (PORCINE) LOCK FLUSH IV SOLN 100 UNIT/ML 500 UNITS: 100 SOLUTION at 11:55

## 2021-05-19 RX ADMIN — HEPARIN 500 UNITS: 100 SYRINGE at 11:55

## 2021-05-19 ASSESSMENT — PAIN SCALES - GENERAL: PAINLEVEL: NO PAIN (0)

## 2021-05-19 ASSESSMENT — MIFFLIN-ST. JEOR: SCORE: 1572.12

## 2021-05-19 NOTE — PROGRESS NOTES
Infusion Nursing Note    Artemio Nino Presents to [facility] today for: Port labs    Due to :    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)  Acute lymphoblastic leukemia (ALL) in remission (H)    Intravenous Access/Labs:  Port accessed without difficulty. Labs drawn as ordered from port. Port heparin locked and de-accessed.

## 2021-05-19 NOTE — TELEPHONE ENCOUNTER
PA Initiation    Medication: Jakafi 20mg PA Pending  Insurance Company: [employer] - Phone [phone] Fax [phone]  Pharmacy Filling the Rx:    Filling Pharmacy Phone:    Filling Pharmacy Fax:    Start Date: 5/19/2021      Marco A Rucker CPhT  [facility]  Oncology Pharmacy [email or web address]  [phone] (phone  [phone] (fax

## 2021-05-20 LAB
CMV DNA SPEC NAA+PROBE-ACNC: NORMAL [IU]/ML
CMV DNA SPEC NAA+PROBE-LOG#: NORMAL {LOG_IU}/ML
IGG SERPL-MCNC: 439 MG/DL (ref 610–1616)
SPECIMEN SOURCE: NORMAL

## 2021-05-20 NOTE — TELEPHONE ENCOUNTER
PRIOR AUTHORIZATION DENIED    Medication: Jakafi 20mg PA denied    Denial Date:  5/20/2021     Denial Rational: off-label    Appeal Information: information sent to provider          Marco A Rucker CPhT  [facility]  Oncology Pharmacy Liaison   [email or web address]  [phone] (phone  [phone] (fax

## 2021-05-21 ENCOUNTER — HOSPITAL ENCOUNTER (OUTPATIENT)
Facility: CLINIC | Age: 21
Discharge: HOME | End: 2021-05-21
Attending: RADIOLOGY | Admitting: RADIOLOGY
Payer: COMMERCIAL

## 2021-05-21 ENCOUNTER — ANESTHESIA (OUTPATIENT)
Dept: PEDIATRICS | Facility: CLINIC | Age: 21
End: 2021-05-21
Payer: COMMERCIAL

## 2021-05-21 ENCOUNTER — ANESTHESIA EVENT (OUTPATIENT)
Dept: PEDIATRICS | Facility: CLINIC | Age: 21
End: 2021-05-21
Payer: COMMERCIAL

## 2021-05-21 VITALS
TEMPERATURE: 97.5 F | BODY MASS INDEX: 19.06 KG/M2 | WEIGHT: 128.53 LBS | OXYGEN SATURATION: 99 % | DIASTOLIC BLOOD PRESSURE: 47 MMHG | HEART RATE: 59 BPM | SYSTOLIC BLOOD PRESSURE: 85 MMHG | RESPIRATION RATE: 20 BRPM

## 2021-05-21 DIAGNOSIS — C91.01 ACUTE LYMPHOBLASTIC LEUKEMIA (ALL) IN REMISSION (H): ICD-10-CM

## 2021-05-21 DIAGNOSIS — C91.02 ACUTE LYMPHOBLASTIC LEUKEMIA (ALL) IN RELAPSE (H): Primary | ICD-10-CM

## 2021-05-21 LAB
BASOPHILS # BLD AUTO: 0 10E9/L (ref 0–0.2)
BASOPHILS NFR BLD AUTO: 0.2 %
COPATH REPORT: NORMAL
DIFFERENTIAL METHOD BLD: ABNORMAL
EOSINOPHIL # BLD AUTO: 0.1 10E9/L (ref 0–0.7)
EOSINOPHIL NFR BLD AUTO: 1 %
ERYTHROCYTE [DISTWIDTH] IN BLOOD BY AUTOMATED COUNT: 17.7 % (ref 10–15)
HCT VFR BLD AUTO: 36.8 % (ref 40–53)
HGB BLD-MCNC: 12.4 G/DL (ref 13.3–17.7)
IMM GRANULOCYTES # BLD: 0 10E9/L (ref 0–0.4)
IMM GRANULOCYTES NFR BLD: 0.3 %
LYMPHOCYTES # BLD AUTO: 2.4 10E9/L (ref 0.8–5.3)
LYMPHOCYTES NFR BLD AUTO: 39.6 %
MCH RBC QN AUTO: 31.6 PG (ref 26.5–33)
MCHC RBC AUTO-ENTMCNC: 33.7 G/DL (ref 31.5–36.5)
MCV RBC AUTO: 94 FL (ref 78–100)
MONOCYTES # BLD AUTO: 1 10E9/L (ref 0–1.3)
MONOCYTES NFR BLD AUTO: 16.9 %
NEUTROPHILS # BLD AUTO: 2.6 10E9/L (ref 1.6–8.3)
NEUTROPHILS NFR BLD AUTO: 42 %
NRBC # BLD AUTO: 0 10*3/UL
NRBC BLD AUTO-RTO: 0 /100
PLATELET # BLD AUTO: 153 10E9/L (ref 150–450)
RBC # BLD AUTO: 3.93 10E12/L (ref 4.4–5.9)
WBC # BLD AUTO: 6.1 10E9/L (ref 4–11)

## 2021-05-21 PROCEDURE — 250N000009 HC RX 250: Performed by: NURSE ANESTHETIST, CERTIFIED REGISTERED

## 2021-05-21 PROCEDURE — 88271 CYTOGENETICS DNA PROBE: CPT | Performed by: NURSE PRACTITIONER

## 2021-05-21 PROCEDURE — 88189 FLOWCYTOMETRY/READ 16 & >: CPT | Performed by: STUDENT IN AN ORGANIZED HEALTH CARE EDUCATION/TRAINING PROGRAM

## 2021-05-21 PROCEDURE — 88185 FLOWCYTOMETRY/TC ADD-ON: CPT | Performed by: NURSE PRACTITIONER

## 2021-05-21 PROCEDURE — 38222 DX BONE MARROW BX & ASPIR: CPT | Performed by: NURSE PRACTITIONER

## 2021-05-21 PROCEDURE — 81270 JAK2 GENE: CPT | Performed by: NURSE PRACTITIONER

## 2021-05-21 PROCEDURE — 88275 CYTOGENETICS 100-300: CPT | Performed by: NURSE PRACTITIONER

## 2021-05-21 PROCEDURE — G0452 MOLECULAR PATHOLOGY INTERPR: HCPCS | Mod: 26 | Performed by: PATHOLOGY

## 2021-05-21 PROCEDURE — 88280 CHROMOSOME KARYOTYPE STUDY: CPT | Performed by: NURSE PRACTITIONER

## 2021-05-21 PROCEDURE — 999N001104 HC STATISTIC DNA ISOL HIGH PURITY: Performed by: NURSE PRACTITIONER

## 2021-05-21 PROCEDURE — 88291 CYTO/MOLECULAR REPORT: CPT | Performed by: MEDICAL GENETICS

## 2021-05-21 PROCEDURE — 81261 IGH GENE REARRANGE AMP METH: CPT | Performed by: NURSE PRACTITIONER

## 2021-05-21 PROCEDURE — 250N000013 HC RX MED GY IP 250 OP 250 PS 637

## 2021-05-21 PROCEDURE — 258N000003 HC RX IP 258 OP 636: Performed by: NURSE ANESTHETIST, CERTIFIED REGISTERED

## 2021-05-21 PROCEDURE — 81219 CALR GENE COM VARIANTS: CPT | Performed by: NURSE PRACTITIONER

## 2021-05-21 PROCEDURE — 85025 COMPLETE CBC W/AUTO DIFF WBC: CPT | Performed by: NURSE PRACTITIONER

## 2021-05-21 PROCEDURE — 999N000131 HC STATISTIC POST-PROCEDURE RECOVERY CARE: Performed by: NURSE PRACTITIONER

## 2021-05-21 PROCEDURE — 81267 CHIMERISM ANAL NO CELL SELEC: CPT | Performed by: NURSE PRACTITIONER

## 2021-05-21 PROCEDURE — 250N000009 HC RX 250: Performed by: NURSE PRACTITIONER

## 2021-05-21 PROCEDURE — 370N000017 HC ANESTHESIA TECHNICAL FEE, PER MIN: Performed by: NURSE PRACTITIONER

## 2021-05-21 PROCEDURE — 88237 TISSUE CULTURE BONE MARROW: CPT | Performed by: NURSE PRACTITIONER

## 2021-05-21 PROCEDURE — 250N000011 HC RX IP 250 OP 636: Performed by: NURSE ANESTHETIST, CERTIFIED REGISTERED

## 2021-05-21 PROCEDURE — 272N000008 HC KIT BIOPSY BONE MARROW: Performed by: NURSE PRACTITIONER

## 2021-05-21 PROCEDURE — 999N000141 HC STATISTIC PRE-PROCEDURE NURSING ASSESSMENT: Performed by: NURSE PRACTITIONER

## 2021-05-21 PROCEDURE — 999N001137 HC STATISTIC FLOW >15 ABY TC 88189: Performed by: NURSE PRACTITIONER

## 2021-05-21 PROCEDURE — 88264 CHROMOSOME ANALYSIS 20-25: CPT | Performed by: NURSE PRACTITIONER

## 2021-05-21 PROCEDURE — 250N000011 HC RX IP 250 OP 636

## 2021-05-21 PROCEDURE — 81403 MOPATH PROCEDURE LEVEL 4: CPT | Performed by: NURSE PRACTITIONER

## 2021-05-21 PROCEDURE — 88184 FLOWCYTOMETRY/ TC 1 MARKER: CPT | Performed by: NURSE PRACTITIONER

## 2021-05-21 RX ORDER — PROPOFOL 10 MG/ML
INJECTION, EMULSION INTRAVENOUS PRN
Status: DISCONTINUED | OUTPATIENT
Start: 2021-05-21 | End: 2021-05-21

## 2021-05-21 RX ORDER — PROPOFOL 10 MG/ML
INJECTION, EMULSION INTRAVENOUS CONTINUOUS PRN
Status: DISCONTINUED | OUTPATIENT
Start: 2021-05-21 | End: 2021-05-21

## 2021-05-21 RX ORDER — SODIUM CHLORIDE, SODIUM LACTATE, POTASSIUM CHLORIDE, CALCIUM CHLORIDE 600; 310; 30; 20 MG/100ML; MG/100ML; MG/100ML; MG/100ML
INJECTION, SOLUTION INTRAVENOUS CONTINUOUS PRN
Status: DISCONTINUED | OUTPATIENT
Start: 2021-05-21 | End: 2021-05-21

## 2021-05-21 RX ORDER — FENTANYL CITRATE 50 UG/ML
INJECTION, SOLUTION INTRAMUSCULAR; INTRAVENOUS PRN
Status: DISCONTINUED | OUTPATIENT
Start: 2021-05-21 | End: 2021-05-21

## 2021-05-21 RX ORDER — ACETAMINOPHEN 325 MG/1
TABLET ORAL
Status: COMPLETED
Start: 2021-05-21 | End: 2021-05-21

## 2021-05-21 RX ORDER — ACETAMINOPHEN 325 MG/1
650 TABLET ORAL ONCE
Status: CANCELLED | OUTPATIENT
Start: 2021-05-21 | End: 2021-05-21

## 2021-05-21 RX ORDER — HEPARIN SODIUM (PORCINE) LOCK FLUSH IV SOLN 100 UNIT/ML 100 UNIT/ML
5 SOLUTION INTRAVENOUS ONCE
Status: COMPLETED | OUTPATIENT
Start: 2021-05-21 | End: 2021-05-21

## 2021-05-21 RX ORDER — HEPARIN SODIUM (PORCINE) LOCK FLUSH IV SOLN 100 UNIT/ML 100 UNIT/ML
SOLUTION INTRAVENOUS
Status: COMPLETED
Start: 2021-05-21 | End: 2021-05-21

## 2021-05-21 RX ORDER — EPHEDRINE SULFATE 50 MG/ML
INJECTION, SOLUTION INTRAMUSCULAR; INTRAVENOUS; SUBCUTANEOUS PRN
Status: DISCONTINUED | OUTPATIENT
Start: 2021-05-21 | End: 2021-05-21

## 2021-05-21 RX ADMIN — Medication 5 MG: at 13:00

## 2021-05-21 RX ADMIN — PHENYLEPHRINE HYDROCHLORIDE 50 MCG: 10 INJECTION INTRAVENOUS at 12:54

## 2021-05-21 RX ADMIN — SODIUM CHLORIDE, POTASSIUM CHLORIDE, SODIUM LACTATE AND CALCIUM CHLORIDE: 600; 310; 30; 20 INJECTION, SOLUTION INTRAVENOUS at 12:43

## 2021-05-21 RX ADMIN — PROPOFOL 60 MG: 10 INJECTION, EMULSION INTRAVENOUS at 12:43

## 2021-05-21 RX ADMIN — Medication 5 MG: at 12:57

## 2021-05-21 RX ADMIN — ACETAMINOPHEN 650 MG: 325 TABLET ORAL at 13:35

## 2021-05-21 RX ADMIN — PROPOFOL 300 MCG/KG/MIN: 10 INJECTION, EMULSION INTRAVENOUS at 12:43

## 2021-05-21 RX ADMIN — PHENYLEPHRINE HYDROCHLORIDE 50 MCG: 10 INJECTION INTRAVENOUS at 13:10

## 2021-05-21 RX ADMIN — FENTANYL CITRATE 25 MCG: 50 INJECTION, SOLUTION INTRAMUSCULAR; INTRAVENOUS at 12:43

## 2021-05-21 RX ADMIN — Medication 5 MG: at 12:50

## 2021-05-21 RX ADMIN — HEPARIN SODIUM (PORCINE) LOCK FLUSH IV SOLN 100 UNIT/ML 5 ML: 100 SOLUTION at 13:35

## 2021-05-21 RX ADMIN — HEPARIN 5 ML: 100 SYRINGE at 13:35

## 2021-05-21 RX ADMIN — Medication 5 MG: at 12:46

## 2021-05-21 RX ADMIN — Medication 5 MG: at 12:54

## 2021-05-21 ASSESSMENT — ENCOUNTER SYMPTOMS
APNEA: 0
ROS SKIN COMMENTS: NO ACUTE ISSUES

## 2021-05-21 NOTE — ANESTHESIA CARE TRANSFER NOTE
Patient: Artemio Nino    Procedure(s):  BIOPSY, BONE MARROW    Diagnosis: ALL (acute lymphoblastic leukemia) (H) [C91.00]  Diagnosis Additional Information: No value filed.    Anesthesia Type:   No value filed.     Note:    Oropharynx: oropharynx clear of all foreign objects and spontaneously breathing  Level of Consciousness: awake and drowsy  Oxygen Supplementation: nasal cannula  Level of Supplemental Oxygen (L/min / FiO2): 2  Independent Airway: airway patency satisfactory and stable  Dentition: dentition unchanged  Vital Signs Stable: post-procedure vital signs reviewed and stable  Report to RN Given: handoff report given  Patient transferred to:  Recovery    Handoff Report: Identifed the Patient, Identified the Reponsible Provider, Reviewed the pertinent medical history, Discussed the surgical course, Reviewed Intra-OP anesthesia mangement and issues during anesthesia, Set expectations for post-procedure period and Allowed opportunity for questions and acknowledgement of understanding      Vitals: (Last set prior to Anesthesia Care Transfer)  CRNA VITALS  5/21/2021 1244 - 5/21/2021 1318      5/21/2021             NIBP:  (!) 82/40    Pulse:  54    NIBP Mean:  70    Temp:  36.3  C (97.3  F)    SpO2:  100 %    Resp Rate (observed):  24        Electronically Signed By: SNOW Stein CRNA  May 21, 2021  1:18 PM

## 2021-05-21 NOTE — DISCHARGE INSTRUCTIONS
Home Instructions for Your Child after Sedation  Today your child received (medicine):  Propofol and Fentanyl  Please keep this form with your health records  Your child may be more sleepy and irritable today than normal. Wake your child up every 1 to 11/2 hours during the day. (This way, both you and your child will sleep through the night.) Also, an adult should stay with your child for the rest of the day. The medicine may make the child dizzy. Avoid activities that require balance (bike riding, skating, climbing stairs, walking).  Remember:    For young infants: Do not allow the car seat or infant seat to bend the child's head forward and down. If it does, your child may not be able to breathe.    When your child wants to eat again, start with liquids (juice, soda pop, Popsicles). If your child feels well enough, you may try a regular diet. It is best to offer light meals for the first 24 hours.    If your child has nausea (feels sick to the stomach) or vomiting (throws up), give small amounts of clear liquids (7-Up, Sprite, apple juice or broth). Fluids are more important than food until your child is feeling better.    Wait 24 hours before giving medicine that contains alcohol. This includes liquid cold, cough and allergy medicines (Robitussin, Vicks Formula 44 for children, Benadryl, Chlor-Trimeton).    If you will leave your child with a , give the sitter a copy of these instructions.  Call your doctor if:    You have questions about the test results.    Your child vomits (throws up) more than two times.    Your child is very fussy or irritable.    You have trouble waking your child.     If your child has trouble breathing, call [phone].  If you have any questions or concerns, please call:  Pediatric Sedation Unit [phone]  Pediatric clinic  [phone]  [facility]  [phone] (ask for the doctor on call)  Emergency department [phone]  [facility] toll-free  number [phone] (Monday--Friday, 8 a.m. to 4:30 p.m.)  I understand these instructions. I have all of my personal belongings.      [facility]  [phone]    Care for Bone Marrow Biopsy    Do not remove bandage/dressing for 24 hours -- after this time they can be removed. If Steri-strips are presents they can stay on until they fall off    No bath, shower or soaking of the dressing for 24 hours    Activity as tolerated by the patient    Diet as able to tolerate    May use Tylenol as needed for pain control    Can apply icepack to the site for discomfort -- no more than 10 minutes at a time    If bleeding presents, apply pressure for 5 minutes    Call [phone] ask for Peds BMT/Hem/Onc fellow on call if complications arise including:     persistent bleeding    fever greater than 100.5    pain

## 2021-05-21 NOTE — ANESTHESIA POSTPROCEDURE EVALUATION
Patient: Artemio Nino    Procedure(s):  BIOPSY, BONE MARROW    Diagnosis:ALL (acute lymphoblastic leukemia) (H) [C91.00]  Diagnosis Additional Information: No value filed.    Anesthesia Type:  General    Note:  Disposition: Outpatient   Postop Pain Control: Uneventful            Sign Out: Well controlled pain   PONV: No   Neuro/Psych: Uneventful            Sign Out: Acceptable/Baseline neuro status   Airway/Respiratory: Uneventful            Sign Out: Acceptable/Baseline resp. status   CV/Hemodynamics: Uneventful            Sign Out: Acceptable CV status; No obvious hypovolemia; No obvious fluid overload   Other NRE: NONE   DID A NON-ROUTINE EVENT OCCUR? No           Last vitals:  Vitals:    05/21/21 1315 05/21/21 1330 05/21/21 1345   BP: (!) 82/39 (!) 81/43 (!) 85/47   Pulse: 65 63 59   Resp: 21 23 20   Temp: 36.3  C (97.3  F) 36.4  C (97.6  F) 36.4  C (97.5  F)   SpO2: 100% 99% 99%       Last vitals prior to Anesthesia Care Transfer:  CRNA VITALS  5/21/2021 1244 - 5/21/2021 1344      5/21/2021             NIBP:  (!) 82/40    Pulse:  54    NIBP Mean:  70    Temp:  36.3  C (97.3  F)    SpO2:  100 %    Resp Rate (observed):  24          Electronically Signed By: Marian Redman MD  May 21, 2021  2:27 PM

## 2021-05-21 NOTE — ANESTHESIA PREPROCEDURE EVALUATION
Anesthesia Pre-Procedure Evaluation    Patient: Artemio Nino   MRN:     [id number] Gender:   male   Age:    20 year old :      2000        Preoperative Diagnosis: ALL (acute lymphoblastic leukemia) (H) [C91.00]   Procedure(s):  BIOPSY, BONE MARROW     LABS:  CBC:   Lab Results   Component Value Date    WBC 6.1 2021    WBC 6.2 2021    HGB 12.4 (L) 2021    HGB 12.8 (L) 2021    HCT 36.8 (L) 2021    HCT 38.5 (L) 2021     2021     2021     BMP:   Lab Results   Component Value Date     2021     2021    POTASSIUM 3.5 2021    POTASSIUM 4.0 2021    CHLORIDE 109 2021    CHLORIDE 108 2021    CO2 25 2021    CO2 22 2021    BUN 16 2021    BUN 10 2021    CR 0.84 2021    CR 0.86 2021     (H) 2021     (H) 2021     COAGS:   Lab Results   Component Value Date    PTT 52 (H) 2021    INR 1.01 2021    FIBR 578 (H) 2021     POC:   Lab Results   Component Value Date    BGM 98 2020     OTHER:   Lab Results   Component Value Date    ELSA 9.3 2021    PHOS 3.2 2021    MAG 2.3 2021    ALBUMIN 3.5 2021    PROTTOTAL 6.4 (L) 2021     (H) 2021    AST 66 (H) 2021     (H) 2020    ALKPHOS 69 2021    BILITOTAL 0.6 2021    TSH 4.20 (H) 2019    T4 0.88 2019    CRP <2.9 2020        Preop Vitals    BP Readings from Last 3 Encounters:   05/21/21 94/67   21 106/72   21 102/70    Pulse Readings from Last 3 Encounters:   21 66   21 109   21 93      Resp Readings from Last 3 Encounters:   21 16   21 18   21 18    SpO2 Readings from Last 3 Encounters:   21 98%   21 98%   21 98%      Temp Readings from Last 1 Encounters:   21 36.8  C (98.3  F) (Oral)    Ht Readings from Last 1 Encounters:  "  05/19/21 1.749 m (5' 8.86\")      Wt Readings from Last 1 Encounters:   05/21/21 58.3 kg (128 lb 8.5 oz)    Estimated body mass index is 19.06 kg/m  as calculated from the following:    Height as of 5/19/21: 1.749 m (5' 8.86\").    Weight as of this encounter: 58.3 kg (128 lb 8.5 oz).     LDA:  Port A Cath Single 06/10/20 Right Chest wall (Active)   Site Assessment WDL 05/21/21 1130   Dressing Status clean;dry;intact 05/21/21 1130   Dressing Intervention Transparent 05/21/21 1130   Dressing change due 02/15/21 02/08/21 1201   Line Status Blood return noted;Saline locked 05/21/21 1130   Access Date 05/21/21 05/21/21 1130   Access Attempts 1 05/21/21 1130   Gauge Noncoring 90 degree bend;3/4 inch;20 gauge 05/21/21 1130   Needle Change Due 06/18/21 05/21/21 1130   Line Necessity Yes, meets criteria 02/09/21 0800   De-Access Date 05/19/21 05/19/21 1200   Date to be Reflushed 06/16/21 05/19/21 1200   Extravasation? No 05/19/21 1200   Number of days: 345        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)      History of blood transfusion      WPW (Aaron-Parkinson-White syndrome) 03/2018      Past Surgical History:   Procedure Laterality Date     BONE MARROW BIOPSY, BONE SPECIMEN, NEEDLE/TROCAR Right 11/27/2018    Procedure: bone marrow biopsy;  Surgeon: Jacqueline Fitzgerald NP;  [facility]      BONE MARROW BIOPSY, BONE SPECIMEN, NEEDLE/TROCAR N/A 2/8/2019    Procedure: BIOPSY BONE MARROW;  Surgeon: Lisa Workman NP;  [facility]      BONE MARROW BIOPSY, BONE SPECIMEN, NEEDLE/TROCAR N/A 5/16/2019    Procedure: BIOPSY, BONE MARROW;  Surgeon: Lilia López APRN CNP;  [facility]     BONE MARROW BIOPSY, BONE SPECIMEN, NEEDLE/TROCAR N/A 11/7/2019    Procedure: Bone marrow biopsy;  Surgeon: Jacqueline Shin NP;  [facility]      BONE MARROW BIOPSY, BONE SPECIMEN, NEEDLE/TROCAR Right 6/10/2020    Procedure: BIOPSY, BONE MARROW;  " Surgeon: Candido Han PA-C;  [facility]      BONE MARROW BIOPSY, BONE SPECIMEN, NEEDLE/TROCAR N/A 8/24/2020    Procedure: Bone marrow biopsy;  Surgeon: Jacqueline Shin, NP;  [facility]      BONE MARROW BIOPSY, BONE SPECIMEN, NEEDLE/TROCAR N/A 9/25/2020    Procedure: BIOPSY, BONE MARROW;  Surgeon: Lilia López APRN CNP;  [facility]      BONE MARROW BIOPSY, BONE SPECIMEN, NEEDLE/TROCAR N/A 3/11/2021    Procedure: BIOPSY BONE MARROW;  Surgeon: Lisa Workman NP;  [facility]      ESOPHAGOSCOPY, GASTROSCOPY, DUODENOSCOPY (EGD), COMBINED N/A 2/22/2019    Procedure: Upper endoscopy with biopsy;  Surgeon: Magdalene Hobson MD;  [facility]      INSERT CATHETER VASCULAR ACCESS N/A 7/20/2020    Procedure: NON -APHERESIS Double lumen tunneled central burns line placement;  Surgeon: Pato Gómez PA-C;  [facility]      INSERT CATHETER VASCULAR ACCESS N/A 2/8/2021    Procedure: apheresis catheter placement;  Surgeon: Pato Gómez PA-C;  [facility]      INSERT CATHETER VASCULAR ACCESS APHERESIS CHILD N/A 6/10/2020    Procedure: Large Bore Double Lumen NON TUNNELED Apheresis Catheter placement;  Surgeon: Link Rios PA-C;  [facility]      INSERT CATHETER VASCULAR ACCESS DOUBLE LUMEN CHILD N/A 10/26/2018    Procedure: double lumen tunneled line placement;  Surgeon: Rex Valente MD;  [facility]      INSERT PORT VASCULAR ACCESS N/A 6/10/2020    Procedure: Port placement;  Surgeon: Link Rios PA-C;  [facility]      IR CHEST PORT PLACEMENT > 5 YRS OF AGE  6/10/2020     IR CVC NON TUNNEL LINE REMOVAL  6/12/2020     IR CVC NON TUNNEL PLACEMENT  6/10/2020     IR CVC TUNNEL PLACEMENT > 5 YRS OF AGE  7/20/2020     IR CVC TUNNEL PLACEMENT > 5 YRS OF AGE  2/8/2021     IR CVC TUNNEL REMOVAL LEFT  2/8/2019     IR CVC TUNNEL REMOVAL  LEFT  8/24/2020     IR CVC TUNNEL REMOVAL LEFT  4/9/2021     IR PORT REMOVAL LEFT  5/16/2019     O CLAVICLE LEFT Left     fracture with surgical repair and plate/screws     ORTHOPEDIC SURGERY      femur     REMOVE CATHETER VASCULAR ACCESS N/A 2/8/2019    Procedure: Tunneled line removal;  Surgeon: Krystal Esparza MD;  [facility]      REMOVE CATHETER VASCULAR ACCESS N/A 8/24/2020    Procedure: tunneled line removal;  Surgeon: Siri Hernandez PA-C;  [facility]      REMOVE CATHETER VASCULAR ACCESS Left 4/9/2021    Procedure: REMOVAL, VASCULAR ACCESS CATHETER;  Surgeon: Link Rios PA-C;  [facility]      REMOVE PORT VASCULAR ACCESS N/A 5/16/2019    Procedure: REMOVAL, VASCULAR ACCESS PORT;  Surgeon: Link Rios PA-C;  [facility]      Allergies   Allergen Reactions     Blood Transfusion Related (Informational Only) Other (See Comments)     Patient has a history of a clinically significant antibody against RBC antigens.  A delay in compatible RBCs may occur.Stem cell transplant patient.  Give type O RBCs.  Requires Benadryl and tylenol as premed for platelets and RBCs for hx of hives     Voriconazole Photosensitivity     Significant rash - do not rechallenge        Anesthesia Evaluation    ROS/Med Hx    No history of anesthetic complications  (-) malignant hyperthermia and tuberculosis    Cardiovascular Findings   Comments: Stable with WPW syndrome; jan 26, 2021: TTE: Patient after bone marrow transplant. Normal echocardiogram. The left and  right ventricles have normal chamber size, wall thickness, and systolic  function. The calculated biplane left ventricular ejection fraction is 64 %. A  catheter is seen with its tip in the right atrium. No pericardial effusion.  Technically difficult study due to lung artifact.;    EKG on same day: Sinus bradycardia  Aaron-Parkinson-White  Abnormal ECG  When compared with ECG of 12-AUG-2020  11:22,  Aaron-Parkinson-White is again Present    Neuro Findings   Comments: No acute issues    Pulmonary Findings   (-) asthma and apnea    HENT Findings - negative HENT ROS    Skin Findings   Comments: No acute issues      GI/Hepatic/Renal Findings   (-) GERD    Endocrine/Metabolic Findings - negative ROS      Genetic/Syndrome Findings - negative genetics/syndromes ROS    Hematology/Oncology Findings   (+) cancer and hematopoietic stem cell transplant            PHYSICAL EXAM:   Mental Status/Neuro: A/A/O   Airway: Facies: Feasible  Mallampati: I  Mouth/Opening: Full  TM distance: > 6 cm  Neck ROM: Full   Respiratory: Auscultation: CTAB     Resp. Rate: Normal     Resp. Effort: Normal      CV: Rhythm: Regular  Rate: Age appropriate  Heart: Normal Sounds  Edema: None   Comments:      Dental: Normal Dentition                Anesthesia Plan    ASA Status:  3   NPO Status:  NPO Appropriate    Anesthesia Type: General.     - Airway: Native airway   Induction: Intravenous, Propofol.   Maintenance: TIVA.   Techniques and Equipment:     - Lines/Monitors: Port in situ     Consents    Anesthesia Plan(s) and associated risks, benefits, and realistic alternatives discussed. Questions answered and patient/representative(s) expressed understanding.     - Discussed with:  Parent (Mother and/or Father), Patient      - Extended Intubation/Ventilatory Support Discussed: No.      - Patient is DNR/DNI Status: No    Use of blood products discussed: No .     Postoperative Care    Pain management: Oral pain medications.        Comments:             Marian Redman MD

## 2021-05-21 NOTE — PROCEDURES
BMT Bone Marrow Biopsy Procedure Note  May 21, 2021 1:35 PM    DIAGNOSIS: ALL s/p BMT     PROCEDURE: Unilateral Bone Marrow Biopsy and Aspirate    SITE: Pediatric Sedation Suite    Patient s identification was positively verified by verbal identification and invasive procedure safety checklist was completed.  Informed consent was obtained. Following the administration of propofol as sedation, patient was placed in the right lateral decubitus position and prepped and draped in a sterile manner.  Approximately 2 cc of 1% Lidocaine was used over the left posterior iliac spine.  Following this a 3 mm incision was made. Trephine bone marrow core and aspirates were obtained from the [facility]. Aspirates were sent for morphology, immunophenotyping, cytogenetics and molecular diagnostics.  A total of approximately 24 ml of marrow was aspirated.  Following this procedure a sterile dressing was applied to the bone marrow biopsy site. The patient was placed in the supine position to maintain pressure on the biopsy site. Post-procedure wound care instructions were given. The patient tolerated the procedure well with no known discomfort.    Complications: None    Procedure performed by: JOHN Rose CPNP-[facility] Blood and Marrow Transplant  [address] [address]  [phone]  [phone]

## 2021-05-24 LAB
COPATH REPORT: NORMAL

## 2021-05-25 LAB — COPATH REPORT: NORMAL

## 2021-05-26 ENCOUNTER — INFUSION THERAPY VISIT (OUTPATIENT)
Dept: INFUSION THERAPY | Facility: CLINIC | Age: 21
End: 2021-05-26
Attending: PEDIATRICS
Payer: COMMERCIAL

## 2021-05-26 ENCOUNTER — ONCOLOGY VISIT (OUTPATIENT)
Dept: TRANSPLANT | Facility: CLINIC | Age: 21
End: 2021-05-26
Attending: PEDIATRICS
Payer: COMMERCIAL

## 2021-05-26 VITALS
TEMPERATURE: 98.6 F | WEIGHT: 129.41 LBS | HEIGHT: 69 IN | DIASTOLIC BLOOD PRESSURE: 60 MMHG | BODY MASS INDEX: 19.17 KG/M2 | OXYGEN SATURATION: 98 % | RESPIRATION RATE: 18 BRPM | SYSTOLIC BLOOD PRESSURE: 98 MMHG | HEART RATE: 82 BPM

## 2021-05-26 DIAGNOSIS — C91.01 ACUTE LYMPHOBLASTIC LEUKEMIA (ALL) IN REMISSION (H): Primary | ICD-10-CM

## 2021-05-26 DIAGNOSIS — C91.02 ACUTE LYMPHOBLASTIC LEUKEMIA (ALL) IN RELAPSE (H): Primary | ICD-10-CM

## 2021-05-26 LAB
BASOPHILS # BLD AUTO: 0 10E9/L (ref 0–0.2)
BASOPHILS NFR BLD AUTO: 0.2 %
CD19 CELLS # BLD: 2134 CELLS/UL (ref 107–698)
CD19 CELLS NFR BLD: 86 % (ref 6–27)
CD3 CELLS # BLD: 16 CELLS/UL (ref 603–2990)
CD3 CELLS NFR BLD: 1 % (ref 49–84)
CD3+CD4+ CELLS # BLD: 1 CELLS/UL (ref 441–2156)
CD3+CD4+ CELLS NFR BLD: <1 % (ref 28–63)
CD3+CD8+ CELLS # BLD: 10 CELLS/UL (ref 125–1312)
CD3+CD8+ CELLS NFR BLD: <1 % (ref 10–40)
CD3-CD16+CD56+ CELLS # BLD: 314 CELLS/UL (ref 95–640)
CD3-CD16+CD56+ CELLS NFR BLD: 13 % (ref 4–25)
DIFFERENTIAL METHOD BLD: ABNORMAL
EOSINOPHIL # BLD AUTO: 0.1 10E9/L (ref 0–0.7)
EOSINOPHIL NFR BLD AUTO: 1 %
ERYTHROCYTE [DISTWIDTH] IN BLOOD BY AUTOMATED COUNT: 17.9 % (ref 10–15)
HCT VFR BLD AUTO: 38.4 % (ref 40–53)
HGB BLD-MCNC: 12.4 G/DL (ref 13.3–17.7)
IFC SPECIMEN: ABNORMAL
IMM GRANULOCYTES # BLD: 0 10E9/L (ref 0–0.4)
IMM GRANULOCYTES NFR BLD: 0.2 %
LYMPHOCYTES # BLD AUTO: 2.6 10E9/L (ref 0.8–5.3)
LYMPHOCYTES NFR BLD AUTO: 43.3 %
MCH RBC QN AUTO: 30.8 PG (ref 26.5–33)
MCHC RBC AUTO-ENTMCNC: 32.3 G/DL (ref 31.5–36.5)
MCV RBC AUTO: 96 FL (ref 78–100)
MONOCYTES # BLD AUTO: 1.1 10E9/L (ref 0–1.3)
MONOCYTES NFR BLD AUTO: 18.7 %
NEUTROPHILS # BLD AUTO: 2.2 10E9/L (ref 1.6–8.3)
NEUTROPHILS NFR BLD AUTO: 36.6 %
NRBC # BLD AUTO: 0 10*3/UL
NRBC BLD AUTO-RTO: 0 /100
PLATELET # BLD AUTO: 160 10E9/L (ref 150–450)
RBC # BLD AUTO: 4.02 10E12/L (ref 4.4–5.9)
RETICS # AUTO: 60.7 10E9/L (ref 25–95)
RETICS/RBC NFR AUTO: 1.5 % (ref 0.5–2)
WBC # BLD AUTO: 5.9 10E9/L (ref 4–11)

## 2021-05-26 PROCEDURE — 250N000011 HC RX IP 250 OP 636

## 2021-05-26 PROCEDURE — G0463 HOSPITAL OUTPT CLINIC VISIT: HCPCS

## 2021-05-26 PROCEDURE — 86357 NK CELLS TOTAL COUNT: CPT | Performed by: PEDIATRICS

## 2021-05-26 PROCEDURE — 86355 B CELLS TOTAL COUNT: CPT | Performed by: PEDIATRICS

## 2021-05-26 PROCEDURE — 99215 OFFICE O/P EST HI 40 MIN: CPT | Performed by: PEDIATRICS

## 2021-05-26 PROCEDURE — 36591 DRAW BLOOD OFF VENOUS DEVICE: CPT

## 2021-05-26 PROCEDURE — 86359 T CELLS TOTAL COUNT: CPT | Performed by: PEDIATRICS

## 2021-05-26 PROCEDURE — 86360 T CELL ABSOLUTE COUNT/RATIO: CPT | Performed by: PEDIATRICS

## 2021-05-26 PROCEDURE — 85045 AUTOMATED RETICULOCYTE COUNT: CPT | Performed by: PEDIATRICS

## 2021-05-26 PROCEDURE — 85025 COMPLETE CBC W/AUTO DIFF WBC: CPT | Performed by: PEDIATRICS

## 2021-05-26 PROCEDURE — 999N001086 HC STATISTIC MORPHOLOGY W/INTERP HEMEPATH TC 85060: Performed by: PEDIATRICS

## 2021-05-26 RX ORDER — HEPARIN SODIUM (PORCINE) LOCK FLUSH IV SOLN 100 UNIT/ML 100 UNIT/ML
5 SOLUTION INTRAVENOUS
Status: DISCONTINUED | OUTPATIENT
Start: 2021-05-26 | End: 2021-05-26 | Stop reason: HOSPADM

## 2021-05-26 RX ORDER — HEPARIN SODIUM (PORCINE) LOCK FLUSH IV SOLN 100 UNIT/ML 100 UNIT/ML
SOLUTION INTRAVENOUS
Status: COMPLETED
Start: 2021-05-26 | End: 2021-05-26

## 2021-05-26 RX ADMIN — HEPARIN: 100 SYRINGE at 11:15

## 2021-05-26 ASSESSMENT — MIFFLIN-ST. JEOR: SCORE: 1576.99

## 2021-05-26 NOTE — LETTER
DATE: 12/20/2021  TO: Artemio Nino  FROM:  [facility]     Your 1 year post transplant follow up appointments are scheduled for:    January 24, 2022   9:00 am  Neuropsychology Testing, [facility],[address] [place] [address]    January 31, 2022  **No calcium supplements or vitamins with calcium for 24 hours prior to Dexa Scan**  9:00 am  Hearing Test, [facility], [address] [place] [address]  9:30 am  ENT Consultation with Dr. Hobbs, [facility]  11:00 am Bone Age, [facility], [address] [place] [address]  11:15 am Dexa Scan, Pediatric Imaging  12:00 pm Echocardiogram, Pediatric Imaging  1:50 pm Pulmonary Function Test, [facility], [address] [place] [address]     February 7, 2022  1:00 pm Virtual Endocrinology Consultation    February 8, 2022  9:45 am  Dermatology Consultation with Dr. Delacruz, [facility], [address] [place] [address]  10:30 am [facility]  11:00 am COVID Test and History and Physical with Provider, [facility]  12:05 pm Ophthalmology Consultation, [facility], 4th Floor, [address] [place] [address]    February 9, 2022  **Pre-admission will call to confirm check in time and review instructions. They can be reached at [phone]**  **See attached Sedation Instructions**  10:00 am Check In: Pediatric Sedation, [address] [place] [address]  11:00 am Labs, Bone Marrow Biopsy and Lumbar Puncture    February 16, 2022   9:30 am  Video Consultation with Dr. Solomno to discuss results    - If you are taking a blood thinner (for instance: aspirin, coumadin, lovenox, etc.) please contact your nurse coordinator or physician for instructions one week prior to your appointment.  - Our financial staff will attempt to obtain any necessary authorization for services.  However we recommend you contact  your insurance company for confirmation of coverage.  - For financial inquiries:  o If you received your transplant within one year of these services, please contact [phone] and ask for the Transplant Finance.  o If you received your transplant greater than 1 year prior to these services, contact your insurance company directly by calling the telephone number on the back of your card.    If you have any questions regarding this appointment, please call me direct at:  [phone].    Sincerely,  Mona Espana  BMT Procedure

## 2021-05-26 NOTE — PROGRESS NOTES
May 26, 2021     Dr. Manjinder Paniagua  [address] [place] [address]     Name: Artemio Nino  MRN: [id number]  : 2000     Dear Dr. Paniagua,     We had the pleasure of seeing your patient Artemio Nino in the [facility] for routine post-BMT follow-up. 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 He presented today for a routine follow-up appointment.       Since last week Cj has been feeling well.  Yesterdays he played soccer and is a little sore today.  His appetite has improved this week with the addition of Merinol and he is tolerating it well.  He has had no vomiting, skin rash or changes. He continues on a Tacrolimus taper.  He has been more active with friends this week as he is feeling better.  He has not had a fever, chills, cough, or runny nose. He has not had any sick contacts.   Review of Systems: Pertinent positives include those mentioned in interval events. A complete review of systems was performed and is otherwise negative.       Medications:  Protonix 40 mg PO BID  Tacrolimus   Ativan PRN  Mag Ox Dialy     Physical Exam:    GEN: Sitting in chair, in no acute distress.Talkative with examiner, cooperative. Father present.   HEENT: Allopecia, NC/AT, Pupils equal and reactive, MMM - no sores, lesions, or actively bleeding gums, nares patent.    CARD: RRR, no m/r/g, normal S1/S2. Port in place on R side of chest. Galvan site with bandage over it.  No issues.   RESP: CTAB, normal WOB, no adventitious sounds  ABD: Soft, NT/ND, flat, NABS  EXTREM: MAEE, WWP  SKIN: Dry, no rashes, no lesions  NEURO: Talkative, CNII-XII grossly intact     Labs:      Assessment/Plan:     Artemio is a 19 yo male with history of multiply relapsed ALL now day +92 s/p UCBT following MAC per 2013. His post-transplant complications have included C.difficile.  Today he is doing well and  will have his day 100 evaluations on Friday.  He has no signs of GVHD.     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 Of note, his initial post CAR-T therapy was complicated by Grade III CRS necessitating Tociluzimab x3, dopamine pressor support, and steroids. No CRS, neurotoxicity or infectious complications from [facility] CAR-T. He received preparative regimen per MT2013-09 with Thiotepa, Fludarabine, Busulfan, and ATG and received a UCBT on 2/16/21. He engrafted on day +20. His Day +21 peripheral VNTRs (3/9) and bone marrow chimerism (3/11) reveals 100% donor chimerism and his flow, morphology, and NGS is negative. FISH negative for ETV6 and JAK2. Day 100 shows 100% engrafted.     Today we have restarted his ruxolitinib 20mg BID today with planned administration for 1 year      Risk for GVHD: MMF discontinued prior to discharge.  Undergoing Tac Wean without signs of GVHD      FEN/Renal:   Regular diet as tolerated.  He has lost weight.  He says he feels full quickly  We will continue Marinol 2.5 BID.     Cardiovascular: History of asymptomatic WPW syndrome (diagnosed 2018): no interventions to date.  Cardiology following.Follow up ECHO in 6 mos (7/2021). Normotensive.     Heme:  Engrafted.      Infectious Disease:  Stop fluconazole and valtrex today.  Continue bactrim.  He has a history of C. Diff.       GI: Continue Protonix BID.Continue ativan, zofran and benadryl PRN for nausea.     Neuro: No longer utilizing oxycodone. L shoulder pain completely resolved. Of note, history of significant spinal headaches; consider using Candelario needle with lumbar punctures.      Fertility: Sperm collected previously for fertility  preservation.     Disposition: RTC in 3-6 months.       Pediatric BMT Attending Note:     I have reviewed changes and data from the last interaction, including medications, laboratory results, vital signs, radiograph results, consultant recommendations, pathology results and other diagnostic studies. I have formulated and discussed the plan with the BMT team.  The relevant clinical topics addressed included the following: follow up tests over the next two weeks  I discussed the course and plan with the patient/family and answered all of their questions to the best of my ability. My care coordination activities today include oversight of planned lab studies, oversight of planned radiographic studies, oversight of medication changes, discussion with BMT team-members and discussion with consultants. Time spent 60 min  With 50% FTF and remainder on lab and imaging review.     Vinod Solomon MD PhD

## 2021-05-26 NOTE — NURSING NOTE
"Chief Complaint   Patient presents with     RECHECK     BMT anniversary     BP 98/60   Pulse 82   Temp 98.6  F (37  C)   Resp 18   Ht 5' 8.98\" (175.2 cm)   Wt 129 lb 6.6 oz (58.7 kg)   SpO2 98%   BMI 19.12 kg/m      Mitra Cobb LPN    "

## 2021-05-26 NOTE — LETTER
RE: Artemio Nino  : 2000  MRN: [id number]     To Whom It May Concern,     I am writing to appeal the denial of payment for Artemio coronel Ruxolitinib.  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  Artemio subsequently relapsed in 2020 and underwent CAR-T cell therapy followed by umbilical cord blood transplant.  Relapse after therapy for Ph-Like Pre B ALL remains a significant challenge.  Pre Clinical Studies support the rationale for use of COLIN inhibition such as Ruxolitinib, as it counteracts the aberrant signaling and proliferation.  For patients who have undergone transplant for  Ph-Like Pre B-ALL a consensus has been reached that like patients with [place] Like Precursor B cell ALL may benefit similarly to patients who have been transplanted for [place] positive Pre B ALL with maintenance TKI therapy (Bud, et. al and Abbie et. al attached). We aim to use Ruxolitinib as a targeted therapy, potentially preventing further relapese for Artemio.     Thank you for your attention to this matter.    Sincerely,       Dr. Vinod Solomon PhD MD FAAP

## 2021-05-27 ENCOUNTER — TELEPHONE (OUTPATIENT)
Dept: TRANSPLANT | Facility: CLINIC | Age: 21
End: 2021-05-27

## 2021-05-27 DIAGNOSIS — Z94.81 STATUS POST BONE MARROW TRANSPLANT (H): Primary | ICD-10-CM

## 2021-05-27 LAB — COPATH REPORT: NORMAL

## 2021-05-27 NOTE — TELEPHONE ENCOUNTER
Medication Appeal Initiation    We have initiated an appeal for the requested medication:  Medication: Jakafi 20mg Appeal Pending  Appeal Start Date:     Insurance Company:    Comments:         Marco A Rucker CPhT  [facility]  Oncology Pharmacy Liaison   [email or web address]  [phone] (phone  [phone] (fax

## 2021-06-01 ENCOUNTER — INFUSION THERAPY VISIT (OUTPATIENT)
Dept: INFUSION THERAPY | Facility: CLINIC | Age: 21
End: 2021-06-01
Attending: PEDIATRICS
Payer: COMMERCIAL

## 2021-06-01 DIAGNOSIS — Z94.81 STATUS POST BONE MARROW TRANSPLANT (H): ICD-10-CM

## 2021-06-01 LAB
BASOPHILS # BLD AUTO: 0 10E9/L (ref 0–0.2)
BASOPHILS NFR BLD AUTO: 0.2 %
DIFFERENTIAL METHOD BLD: ABNORMAL
EOSINOPHIL # BLD AUTO: 0 10E9/L (ref 0–0.7)
EOSINOPHIL NFR BLD AUTO: 0.2 %
ERYTHROCYTE [DISTWIDTH] IN BLOOD BY AUTOMATED COUNT: 17.6 % (ref 10–15)
HCT VFR BLD AUTO: 40.2 % (ref 40–53)
HGB BLD-MCNC: 13 G/DL (ref 13.3–17.7)
IMM GRANULOCYTES # BLD: 0 10E9/L (ref 0–0.4)
IMM GRANULOCYTES NFR BLD: 0.2 %
LYMPHOCYTES # BLD AUTO: 2.4 10E9/L (ref 0.8–5.3)
LYMPHOCYTES NFR BLD AUTO: 45.4 %
MCH RBC QN AUTO: 30.7 PG (ref 26.5–33)
MCHC RBC AUTO-ENTMCNC: 32.3 G/DL (ref 31.5–36.5)
MCV RBC AUTO: 95 FL (ref 78–100)
MONOCYTES # BLD AUTO: 0.9 10E9/L (ref 0–1.3)
MONOCYTES NFR BLD AUTO: 16.9 %
NEUTROPHILS # BLD AUTO: 2 10E9/L (ref 1.6–8.3)
NEUTROPHILS NFR BLD AUTO: 37.1 %
NRBC # BLD AUTO: 0 10*3/UL
NRBC BLD AUTO-RTO: 0 /100
PLATELET # BLD AUTO: 144 10E9/L (ref 150–450)
RBC # BLD AUTO: 4.23 10E12/L (ref 4.4–5.9)
WBC # BLD AUTO: 5.3 10E9/L (ref 4–11)

## 2021-06-01 PROCEDURE — 250N000011 HC RX IP 250 OP 636

## 2021-06-01 PROCEDURE — 81268 CHIMERISM ANAL W/CELL SELECT: CPT | Performed by: PEDIATRICS

## 2021-06-01 PROCEDURE — 36591 DRAW BLOOD OFF VENOUS DEVICE: CPT

## 2021-06-01 PROCEDURE — G0452 MOLECULAR PATHOLOGY INTERPR: HCPCS | Mod: 26 | Performed by: PATHOLOGY

## 2021-06-01 PROCEDURE — 85025 COMPLETE CBC W/AUTO DIFF WBC: CPT | Performed by: PEDIATRICS

## 2021-06-01 RX ORDER — HEPARIN SODIUM (PORCINE) LOCK FLUSH IV SOLN 100 UNIT/ML 100 UNIT/ML
5 SOLUTION INTRAVENOUS
Status: DISCONTINUED | OUTPATIENT
Start: 2021-06-01 | End: 2021-06-01 | Stop reason: HOSPADM

## 2021-06-01 RX ORDER — HEPARIN SODIUM (PORCINE) LOCK FLUSH IV SOLN 100 UNIT/ML 100 UNIT/ML
SOLUTION INTRAVENOUS
Status: COMPLETED
Start: 2021-06-01 | End: 2021-06-01

## 2021-06-01 RX ADMIN — HEPARIN 500 UNITS: 100 SYRINGE at 10:07

## 2021-06-01 RX ADMIN — HEPARIN SODIUM (PORCINE) LOCK FLUSH IV SOLN 100 UNIT/ML 500 UNITS: 100 SOLUTION at 10:07

## 2021-06-01 NOTE — PROGRESS NOTES
Artemio came to clinic today for port access and lab draw. Port accessed using sterile technique without issue. Blood return noted and labs drawn as ordered. Port flushed, heparin locked, and immediately de-accessed following lab draw. Stable patient left clinic with father when appointment complete.

## 2021-06-02 ENCOUNTER — TELEPHONE (OUTPATIENT)
Dept: TRANSPLANT | Facility: CLINIC | Age: 21
End: 2021-06-02

## 2021-06-02 ENCOUNTER — CARE COORDINATION (OUTPATIENT)
Dept: TRANSPLANT | Facility: CLINIC | Age: 21
End: 2021-06-02

## 2021-06-02 DIAGNOSIS — C91.01 ACUTE LYMPHOBLASTIC LEUKEMIA (ALL) IN REMISSION (H): ICD-10-CM

## 2021-06-02 DIAGNOSIS — C91.02 ACUTE LYMPHOBLASTIC LEUKEMIA (ALL) IN RELAPSE (H): Primary | ICD-10-CM

## 2021-06-02 NOTE — TELEPHONE ENCOUNTER
----- Message from Judy Quintanilla RN sent at 6/2/2021  8:28 AM CDT -----  Regarding: Artemio Nino - August BAN  Artemio Nino will need the following for his day +180 August BAN:    - Labs + COVID swab prior to bone marrow biopsy  - Bone marrow biopsy  - Echo, EKG  - Visit with Vinod Claire!

## 2021-06-03 LAB
COPATH REPORT: NORMAL

## 2021-06-04 ENCOUNTER — TELEPHONE (OUTPATIENT)
Dept: ONCOLOGY | Facility: CLINIC | Age: 21
End: 2021-06-04

## 2021-06-04 DIAGNOSIS — C91.02 ACUTE LYMPHOBLASTIC LEUKEMIA (ALL) IN RELAPSE (H): Primary | ICD-10-CM

## 2021-06-04 NOTE — TELEPHONE ENCOUNTER
This afternoon I called and Spoke with the Pt Isael Nino to let him know that CD19 B cell Chimerism testing showed that 100% of his B Cells were from his umbilical chord donor.  When asked if Isael would like to relay this new to his parents or if he would like me to call them Isael asked to give them the information him self.

## 2021-06-08 NOTE — TELEPHONE ENCOUNTER
"SUBJECTIVE:   Brian Flynn is a 65 year old male who presents for Preventive Visit.      Patient has been advised of split billing requirements and indicates understanding: Yes   Are you in the first 12 months of your Medicare coverage?  No    Healthy Habits:     In general, how would you rate your overall health?  Very good    Frequency of exercise:  6-7 days/week (one hour daily - [employer] )    Do you usually eat at least 4 servings of fruit and vegetables a day, include whole grains    & fiber and avoid regularly eating high fat or \"junk\" foods?  No    Taking medications regularly:  Yes    Ability to successfully perform activities of daily living:  No assistance needed    Home Safety:  No safety concerns identified    Hearing Impairment:  No hearing concerns    In the past 6 months, have you been bothered by leaking of urine?  No    In general, how would you rate your overall mental or emotional health?  Very good      PHQ-2 Total Score: 0    Do you feel safe in your environment? Yes    Have you ever done Advance Care Planning? (For example, a Health Directive, POLST, or a discussion with a medical provider or your loved ones about your wishes):     Patient is overall feeling much better since he has change his lifestyle with increasing exercise and eating healthy.  He is just taking Metformin once daily.  His A1c was excellent last time.  Denies any chest pains no shortness of breath.  He is not convinced he should get Covid vaccine at this time.  Denies any other concerns at this time however he would like to check his blood sugar once in a while.  Asking for refill on his blood sugar checking supplies    Fall risk  Fallen 2 or more times in the past year?: No  Any fall with injury in the past year?: No    Cognitive Screening   1) Repeat 3 items (Leader, Season, Table)    2) Clock draw: NORMAL  3) 3 item recall: Recalls 1 object   Results: NORMAL clock, 1-2 items recalled: COGNITIVE IMPAIRMENT LESS " MEDICATION APPEAL APPROVED    Medication: Jakafi 20mg Appeal Approved  Authorization Effective Date: 5/13/2021  Authorization Expiration Date: 6/7/2022  Approved Dose/Quantity: 60/30  Reference #: [id number]    Insurance Company:    Expected CoPay:       CoPay Card Available:      Foundation Assistance Needed:    Which Pharmacy is filling the prescription (Not needed for infusion/clinic administered): [address]    \  Marco A Rucker CPhT  [facility]  Oncology Pharmacy [email or web address]  [phone] (phone  [phone] (fax       LIKELY    Mini-CogTM Copyright MARTITA Campbell. Licensed by the author for use in [facility]; reprinted with permission ([email or web address]). All rights reserved.      Do you have sleep apnea, excessive snoring or daytime drowsiness?: yes    Reviewed and updated as needed this visit by clinical staff  Tobacco  Allergies  Meds     Fam Hx  Soc Hx        Reviewed and updated as needed this visit by Provider                Social History     Tobacco Use     Smoking status: Never Smoker     Smokeless tobacco: Never Used   Substance Use Topics     Alcohol use: No         No flowsheet data found.        Diabetes Follow-up      How often are you checking your blood sugar? Not at all - does not have supplies     What concerns do you have today about your diabetes?  Is taking Metformin only once per day - had stomach issued while two per day     Do you have any of these symptoms? (Select all that apply)  Numbness in feet      Hyperlipidemia Follow-Up      Are you regularly taking any medication or supplement to lower your cholesterol?   Yes- Atorvastatin    Are you having muscle aches or other side effects that you think could be caused by your cholesterol lowering medication?  No    Hypertension Follow-up      Do you check your blood pressure regularly outside of the clinic? Yes     Are you following a low salt diet? Yes    Are your blood pressures ever more than 140 on the top number (systolic) OR more   than 90 on the bottom number (diastolic), for example 140/90? No    BP Readings from Last 2 Encounters:   11/11/20 (!) 154/70   09/30/20 (!) 164/90     Hemoglobin A1C (%)   Date Value   11/11/2020 5.9 (H)   08/10/2020 7.3 (H)     LDL Cholesterol Calculated (mg/dL)   Date Value   07/29/2020 66   01/10/2019 151 (H)         Current providers sharing in care for this patient include:   Patient Care Team:  Kirill Beal MD as PCP - General (Family Practice)  Kirill Beal MD as Assigned PCP    The following health maintenance  "items are reviewed in Epic and correct as of today:  Health Maintenance Due   Topic Date Due     DIABETIC FOOT EXAM  Never done     ANNUAL REVIEW OF HM ORDERS  Never done     ADVANCE CARE PLANNING  Never done     COVID-19 Vaccine (1) Never done     AORTIC ANEURYSM SCREENING (SYSTEM ASSIGNED)  Never done     Pneumococcal Vaccine: 65+ Years (1 of 2 - PPSV23) 02/05/2020     FALL RISK ASSESSMENT  08/13/2020     A1C  02/11/2021     Lab work is in process  Pneumonia Vaccine:Adults age 65+ who received Pneumovax (PPSV23) at 65 years or older: Should be given PCV13 > 1 year after their most recent PPSV23  Any new diagnosis of family breast, ovarian, or bowel cancer? No        Review of Systems  CONSTITUTIONAL: NEGATIVE for fever, chills, change in weight  INTEGUMENTARY/SKIN: NEGATIVE for worrisome rashes, moles or lesions  EYES: NEGATIVE for vision changes or irritation  ENT/MOUTH: NEGATIVE for ear, mouth and throat problems  RESP: NEGATIVE for significant cough or SOB  BREAST: NEGATIVE for masses, tenderness or discharge  CV: NEGATIVE for chest pain, palpitations or peripheral edema  GI: NEGATIVE for nausea, abdominal pain, heartburn, or change in bowel habits  : NEGATIVE for frequency, dysuria, or hematuria  MUSCULOSKELETAL: NEGATIVE for significant arthralgias or myalgia  NEURO: NEGATIVE for weakness, dizziness or paresthesias  ENDOCRINE: NEGATIVE for temperature intolerance, skin/hair changes  HEME: NEGATIVE for bleeding problems  PSYCHIATRIC: NEGATIVE for changes in mood or affect    OBJECTIVE:   Pulse 70   Temp 98.1  F (36.7  C) (Tympanic)   Ht 1.74 m (5' 8.5\")   Wt 114.3 kg (252 lb)   SpO2 96%   BMI 37.75 kg/m   Estimated body mass index is 37.75 kg/m  as calculated from the following:    Height as of this encounter: 1.74 m (5' 8.5\").    Weight as of this encounter: 114.3 kg (252 lb).  Physical Exam  GENERAL: healthy, alert and no distress  EYES: Eyes grossly normal to inspection, PERRL and conjunctivae and " sclerae normal  HENT: ear canals and TM's normal, nose and mouth without ulcers or lesions  NECK: no adenopathy, no asymmetry, masses, or scars and thyroid normal to palpation  RESP: lungs clear to auscultation - no rales, rhonchi or wheezes  CV: regular rate and rhythm, normal S1 S2, no S3 or S4, no murmur, click or rub, no peripheral edema and peripheral pulses strong  ABDOMEN: soft, nontender, no hepatosplenomegaly, no masses and bowel sounds normal  MS: no gross musculoskeletal defects noted, no edema  SKIN: no suspicious lesions or rashes  NEURO: Normal strength and tone, mentation intact and speech normal  PSYCH: mentation appears normal, affect normal/bright    Diagnostic Test Results:  Labs reviewed in Epic    ASSESSMENT / PLAN:       ICD-10-CM    1. Essential hypertension, benign  I10 Comprehensive metabolic panel     Hemoglobin   2. Type 2 diabetes mellitus with other specified complication, without long-term current use of insulin (H)  E11.69 HEMOGLOBIN A1C     blood glucose (ACCU-CHEK SOLO PLUS) test strip     blood glucose monitoring (ACCU-CHEK SOLO PLUS) meter device kit     blood glucose monitoring (SOFTCLIX) lancets   3. Encounter for annual physical exam  Z00.00 REVIEW OF HEALTH MAINTENANCE PROTOCOL ORDERS     Comprehensive metabolic panel     Lipid panel reflex to direct LDL Fasting     Hemoglobin   4. Hyperlipidemia LDL goal <100  E78.5 Comprehensive metabolic panel     Lipid panel reflex to direct LDL Fasting   Labs ordered once done we will follow-up on that.  Patient has increased exercise lost some weight he is very motivated.  He is currently taking Metformin 1 tablet daily.  Advised if he needs any refill in future he will let us know follow-up in 6 months for recheck.  Cussed about Covid vaccine risk-benefit patient does not have intention to get in this time however he thinks about it.  Risk and benefits were discussed.  Blood pressure is stable he can continue this medication will be  "considered in 6 months if this continue to be normal we may adjust his medication.    Patient has been advised of split billing requirements and indicates understanding: Yes  COUNSELING:  Reviewed preventive health counseling, as reflected in patient instructions       Regular exercise       Healthy diet/nutrition    Estimated body mass index is 37.75 kg/m  as calculated from the following:    Height as of this encounter: 1.74 m (5' 8.5\").    Weight as of this encounter: 114.3 kg (252 lb).        He reports that he has never smoked. He has never used smokeless tobacco.      Appropriate preventive services were discussed with this patient, including applicable screening as appropriate for cardiovascular disease, diabetes, osteopenia/osteoporosis, and glaucoma.  As appropriate for age/gender, discussed screening for colorectal cancer, prostate cancer, breast cancer, and cervical cancer. Checklist reviewing preventive services available has been given to the patient.    Reviewed patients plan of care and provided an AVS. The Basic Care Plan (routine screening as documented in Health Maintenance) for Brian meets the Care Plan requirement. This Care Plan has been established and reviewed with the Patient.    Counseling Resources:  ATP IV Guidelines  Pooled Cohorts Equation Calculator  Breast Cancer Risk Calculator  Breast Cancer: Medication to Reduce Risk  FRAX Risk Assessment  ICSI Preventive Guidelines  Dietary Guidelines for Americans, 2010  [employer]'s MyPlate  ASA Prophylaxis  Lung CA Screening    Kirill Beal MD  [facility]    Identified Health Risks:  "

## 2021-06-11 ENCOUNTER — TELEPHONE (OUTPATIENT)
Dept: ONCOLOGY | Facility: CLINIC | Age: 21
End: 2021-06-11

## 2021-06-11 NOTE — TELEPHONE ENCOUNTER
This afternoon I spoke with Mervat Kirkland's Mom to discuss that [facility] was unable to run the extra bone marrow that was sent to them.  We discussed that all other testing including Donor cell chimerisms, Flow, and MRD by flow are all normal.  She asked about the utility of a repeat marrow at this time.  I discussed with her that given his normal counts and BMB results we would not suggest further testing at this time and will see him for his 6 month follow up.

## 2021-06-14 DIAGNOSIS — Z11.59 ENCOUNTER FOR SCREENING FOR OTHER VIRAL DISEASES: ICD-10-CM

## 2021-06-14 NOTE — TELEPHONE ENCOUNTER
6/3 1st email sent, 6/14, 2nd email sent, mom called and confirmed dated, Calendar sent for review

## 2021-06-15 LAB
COPATH REPORT: NORMAL
COPATH REPORT: NORMAL

## 2021-06-17 LAB — COPATH REPORT: NORMAL

## 2021-06-23 ENCOUNTER — TRANSFERRED RECORDS (OUTPATIENT)
Dept: HEALTH INFORMATION MANAGEMENT | Facility: CLINIC | Age: 21
End: 2021-06-23

## 2021-07-02 ENCOUNTER — TRANSFERRED RECORDS (OUTPATIENT)
Dept: HEALTH INFORMATION MANAGEMENT | Facility: CLINIC | Age: 21
End: 2021-07-02

## 2021-07-11 NOTE — PROGRESS NOTES
"May 19, 2021    Dr. Manjinder Paniagua  [facility] [place] [address]    Name: Artemio Nino  MRN: [id number]  : 2000    Dear Dr. Paniagua,    We had the pleasure of seeing your patient Artemio Nino in the [facility] for routine post-BMT follow-up. 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 He presented today for a routine follow-up appointment.      Since last week Cj has been feeling well.  Yesterdays he played soccer and is a little sore today.  His appetite has improved this week with the addition of Merinol and he is tolerating it well.  He has had no vomiting, skin rash or changes. He continues on a Tacrolimus taper.  He has been more active with friends this week as he is feeling better.  He has not had a fever, chills, cough, or runny nose. He has not had any sick contacts.   Review of Systems: Pertinent positives include those mentioned in interval events. A complete review of systems was performed and is otherwise negative.      Medications:  Protonix 40 mg PO BID  Tacrolimus   Ativan PRN  Mag Ox Dialy    Physical Exam:  /72   Pulse 109   Temp 99.2  F (37.3  C) (Oral)   Resp 18   Ht 1.749 m (5' 8.86\")   Wt 58.4 kg (128 lb 12 oz)   SpO2 98%   BMI 19.09 kg/m     GEN: Sitting in chair, in no acute distress.Talkative with examiner, cooperative. Father present.   HEENT: Allopecia, NC/AT, Pupils equal and reactive, MMM - no sores, lesions, or actively bleeding gums, nares patent.    CARD: RRR, no m/r/g, normal S1/S2. Port in place on R side of chest. Galvan site with bandage over it.  No issues.   RESP: CTAB, normal WOB, no adventitious sounds  ABD: Soft, NT/ND, flat, NABS  EXTREM: MAEE, WWP  SKIN: Dry, no rashes, no lesions  NEURO: Talkative, CNII-XII grossly intact    Labs:   Results for orders placed or performed in visit on 21   CBC with platelets " differential     Status: Abnormal   Result Value Ref Range    WBC 6.2 4.0 - 11.0 10e9/L    RBC Count 4.09 (L) 4.4 - 5.9 10e12/L    Hemoglobin 12.8 (L) 13.3 - 17.7 g/dL    Hematocrit 38.5 (L) 40.0 - 53.0 %    MCV 94 78 - 100 fl    MCH 31.3 26.5 - 33.0 pg    MCHC 33.2 31.5 - 36.5 g/dL    RDW 17.6 (H) 10.0 - 15.0 %    Platelet Count 172 150 - 450 10e9/L    Diff Method Automated Method     % Neutrophils 43.1 %    % Lymphocytes 39.5 %    % Monocytes 16.6 %    % Eosinophils 0.3 %    % Basophils 0.2 %    % Immature Granulocytes 0.3 %    Nucleated RBCs 0 0 /100    Absolute Neutrophil 2.7 1.6 - 8.3 10e9/L    Absolute Lymphocytes 2.5 0.8 - 5.3 10e9/L    Absolute Monocytes 1.0 0.0 - 1.3 10e9/L    Absolute Eosinophils 0.0 0.0 - 0.7 10e9/L    Absolute Basophils 0.0 0.0 - 0.2 10e9/L    Abs Immature Granulocytes 0.0 0 - 0.4 10e9/L    Absolute Nucleated RBC 0.0    Comprehensive metabolic panel     Status: Abnormal   Result Value Ref Range    Sodium 140 133 - 144 mmol/L    Potassium 3.5 3.4 - 5.3 mmol/L    Chloride 109 94 - 109 mmol/L    Carbon Dioxide 25 20 - 32 mmol/L    Anion Gap 6 3 - 14 mmol/L    Glucose 110 (H) 70 - 99 mg/dL    Urea Nitrogen 16 7 - 30 mg/dL    Creatinine 0.84 0.66 - 1.25 mg/dL    GFR Estimate >90 >60 mL/min/[1.73_m2]    GFR Estimate If Black >90 >60 mL/min/[1.73_m2]    Calcium 9.3 8.5 - 10.1 mg/dL    Bilirubin Total 0.6 0.2 - 1.3 mg/dL    Albumin 3.5 3.4 - 5.0 g/dL    Protein Total 6.4 (L) 6.8 - 8.8 g/dL    Alkaline Phosphatase 69 40 - 150 U/L     (H) 0 - 70 U/L    AST 66 (H) 0 - 45 U/L   Phosphorus     Status: None   Result Value Ref Range    Phosphorus 3.2 2.5 - 4.5 mg/dL   Magnesium     Status: None   Result Value Ref Range    Magnesium 2.3 1.6 - 2.3 mg/dL     Assessment/Plan:    Artemio is a 19 yo male with 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 His post-transplant complications have included C.difficile.  Today he is doing well and will have his day 100  evaluations on Friday.  He has no signs of GVHD.    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 Of note, his initial post CAR-T therapy was complicated by Grade III CRS necessitating Tociluzimab x3, dopamine pressor support, and steroids. No CRS, neurotoxicity or infectious complications from [place] CAR-T. He received preparative regimen per MT2013-09 with Thiotepa, Fludarabine, Busulfan, and ATG and received a UCBT on 2/16/21. He engrafted on day +20. His Day +21 peripheral VNTRs (3/9) and bone marrow chimerism (3/11) reveals 100% donor chimerism and his flow, morphology, and NGS is negative. FISH negative for ETV6 and JAK2.    Today we have restarted his ruxolitinib 20mg BID today with planned administration for 1 year      Risk for GVHD: MMF discontinued prior to discharge.  Undergoing Tac Wean without signs of GVHD     FEN/Renal:   Regular diet as tolerated.  He has lost weight.  He says he feels full quickly  We will continue Marinol 2.5 BID.     Cardiovascular: History of asymptomatic WPW syndrome (diagnosed 2018): no interventions to date.  Cardiology following.Follow up ECHO in 6 mos (7/2021). Normotensive.     Heme:  Engrafted.      Infectious Disease:  Stop fluconazole and valtrex today.  Continue bactrim.  He has a history of C. Diff.      GI: Continue Protonix BID.Continue ativan, zofran and benadryl PRN for nausea.    Neuro: No longer utilizing oxycodone. L shoulder pain completely resolved. Of note, history of significant spinal headaches; consider using Candelario needle with lumbar punctures.      Fertility: Sperm collected previously for fertility preservation.    Disposition: RTC in 1 week for exam, labs and visit with Dr. Solomon  With day 100 studies results next week. We  will plan transfer back to [facility] for monthly visits and Follow-up in our clinic in 3 months for day 180 visit and procedures after.    Written by Ju Christian MD   Pediatric Bone Marrow Transplant Fellow       Pediatric BMT Attending Note:     I have reviewed changes and data from the last interaction, including medications, laboratory results, vital signs, radiograph results, consultant recommendations, pathology results and other diagnostic studies. I have formulated and discussed the plan with the BMT team.  The relevant clinical topics addressed included the following: follow up tests over the next two weeks  I discussed the course and plan with the patient/family and answered all of their questions to the best of my ability. My care coordination activities today include oversight of planned lab studies, oversight of planned radiographic studies, oversight of medication changes, discussion with BMT team-members and discussion with consultants. Time spent 60 min  With 50% FTF and remainder on lab and imaging review.    Vinod Solomon MD PhD       Negative

## 2021-07-19 ENCOUNTER — TRANSFERRED RECORDS (OUTPATIENT)
Dept: HEALTH INFORMATION MANAGEMENT | Facility: CLINIC | Age: 21
End: 2021-07-19

## 2021-08-11 ENCOUNTER — HOSPITAL ENCOUNTER (OUTPATIENT)
Dept: CARDIOLOGY | Facility: CLINIC | Age: 21
End: 2021-08-11
Attending: PEDIATRICS
Payer: COMMERCIAL

## 2021-08-11 ENCOUNTER — ALLIED HEALTH/NURSE VISIT (OUTPATIENT)
Dept: TRANSPLANT | Facility: CLINIC | Age: 21
End: 2021-08-11
Attending: PEDIATRICS
Payer: COMMERCIAL

## 2021-08-11 ENCOUNTER — HOSPITAL ENCOUNTER (OUTPATIENT)
Facility: CLINIC | Age: 21
Discharge: HOME | End: 2021-08-11
Attending: PEDIATRICS | Admitting: PEDIATRICS
Payer: COMMERCIAL

## 2021-08-11 ENCOUNTER — ANESTHESIA EVENT (OUTPATIENT)
Dept: PEDIATRICS | Facility: CLINIC | Age: 21
End: 2021-08-11
Payer: COMMERCIAL

## 2021-08-11 ENCOUNTER — ONCOLOGY VISIT (OUTPATIENT)
Dept: TRANSPLANT | Facility: CLINIC | Age: 21
End: 2021-08-11
Attending: NURSE PRACTITIONER
Payer: COMMERCIAL

## 2021-08-11 ENCOUNTER — ANESTHESIA (OUTPATIENT)
Dept: PEDIATRICS | Facility: CLINIC | Age: 21
End: 2021-08-11
Payer: COMMERCIAL

## 2021-08-11 VITALS
RESPIRATION RATE: 18 BRPM | WEIGHT: 128.09 LBS | DIASTOLIC BLOOD PRESSURE: 73 MMHG | OXYGEN SATURATION: 100 % | TEMPERATURE: 97.2 F | HEART RATE: 83 BPM | BODY MASS INDEX: 18.91 KG/M2 | SYSTOLIC BLOOD PRESSURE: 105 MMHG

## 2021-08-11 VITALS
OXYGEN SATURATION: 100 % | RESPIRATION RATE: 16 BRPM | BODY MASS INDEX: 18.97 KG/M2 | WEIGHT: 128.09 LBS | HEIGHT: 69 IN | HEART RATE: 67 BPM | SYSTOLIC BLOOD PRESSURE: 104 MMHG | TEMPERATURE: 98.4 F | DIASTOLIC BLOOD PRESSURE: 70 MMHG

## 2021-08-11 DIAGNOSIS — C91.01 ACUTE LYMPHOBLASTIC LEUKEMIA (ALL) IN REMISSION (H): ICD-10-CM

## 2021-08-11 DIAGNOSIS — C91.02 ACUTE LYMPHOBLASTIC LEUKEMIA (ALL) IN RELAPSE (H): Primary | ICD-10-CM

## 2021-08-11 DIAGNOSIS — C91.02 ACUTE LYMPHOBLASTIC LEUKEMIA (ALL) IN RELAPSE (H): ICD-10-CM

## 2021-08-11 DIAGNOSIS — Z11.59 ENCOUNTER FOR SCREENING FOR OTHER VIRAL DISEASES: ICD-10-CM

## 2021-08-11 LAB
ALBUMIN SERPL-MCNC: 4.1 G/DL (ref 3.4–5)
ALP SERPL-CCNC: 76 U/L (ref 40–150)
ALT SERPL W P-5'-P-CCNC: 100 U/L (ref 0–70)
ANION GAP SERPL CALCULATED.3IONS-SCNC: 4 MMOL/L (ref 3–14)
AST SERPL W P-5'-P-CCNC: ABNORMAL U/L
ATRIAL RATE - MUSE: 64 BPM
BILIRUB SERPL-MCNC: 0.4 MG/DL (ref 0.2–1.3)
BUN SERPL-MCNC: 17 MG/DL (ref 7–30)
CALCIUM SERPL-MCNC: 9.1 MG/DL (ref 8.5–10.1)
CHLORIDE BLD-SCNC: 107 MMOL/L (ref 94–109)
CO2 SERPL-SCNC: 26 MMOL/L (ref 20–32)
CREAT SERPL-MCNC: 0.88 MG/DL (ref 0.66–1.25)
DIASTOLIC BLOOD PRESSURE - MUSE: NORMAL MMHG
GFR SERPL CREATININE-BSD FRML MDRD: >90 ML/MIN/1.73M2
GLUCOSE BLD-MCNC: 90 MG/DL (ref 70–99)
GLUCOSE CSF-MCNC: 50 MG/DL (ref 40–70)
INTERPRETATION ECG - MUSE: NORMAL
LAB DIRECTOR COMMENTS: NORMAL
LAB DIRECTOR DISCLAIMER: NORMAL
LAB DIRECTOR INTERPRETATION: NORMAL
LAB DIRECTOR METHODOLOGY: NORMAL
LAB DIRECTOR RESULTS: NORMAL
MDL NUMBER: NORMAL
P AXIS - MUSE: 75 DEGREES
POTASSIUM BLD-SCNC: 4.4 MMOL/L (ref 3.4–5.3)
PR INTERVAL - MUSE: 112 MS
PROT CSF-MCNC: 31 MG/DL (ref 15–60)
PROT SERPL-MCNC: 7.4 G/DL (ref 6.8–8.8)
QRS DURATION - MUSE: 112 MS
QT - MUSE: 416 MS
QTC - MUSE: 429 MS
R AXIS - MUSE: 90 DEGREES
SARS-COV-2 RNA RESP QL NAA+PROBE: NEGATIVE
SODIUM SERPL-SCNC: 137 MMOL/L (ref 133–144)
SPECIMEN DESCRIPTION: NORMAL
SYSTOLIC BLOOD PRESSURE - MUSE: NORMAL MMHG
T AXIS - MUSE: 70 DEGREES
VENTRICULAR RATE- MUSE: 64 BPM

## 2021-08-11 PROCEDURE — 84155 ASSAY OF PROTEIN SERUM: CPT | Performed by: NURSE PRACTITIONER

## 2021-08-11 PROCEDURE — 88305 TISSUE EXAM BY PATHOLOGIST: CPT | Mod: 26 | Performed by: STUDENT IN AN ORGANIZED HEALTH CARE EDUCATION/TRAINING PROGRAM

## 2021-08-11 PROCEDURE — G0463 HOSPITAL OUTPT CLINIC VISIT: HCPCS | Mod: 25 | Performed by: NURSE PRACTITIONER

## 2021-08-11 PROCEDURE — 38222 DX BONE MARROW BX & ASPIR: CPT | Performed by: NURSE PRACTITIONER

## 2021-08-11 PROCEDURE — 272N000008 HC KIT BIOPSY BONE MARROW: Performed by: NURSE PRACTITIONER

## 2021-08-11 PROCEDURE — 250N000009 HC RX 250: Performed by: NURSE PRACTITIONER

## 2021-08-11 PROCEDURE — 250N000011 HC RX IP 250 OP 636: Performed by: NURSE ANESTHETIST, CERTIFIED REGISTERED

## 2021-08-11 PROCEDURE — 999N000141 HC STATISTIC PRE-PROCEDURE NURSING ASSESSMENT: Performed by: NURSE PRACTITIONER

## 2021-08-11 PROCEDURE — 81268 CHIMERISM ANAL W/CELL SELECT: CPT | Performed by: NURSE PRACTITIONER

## 2021-08-11 PROCEDURE — 88108 CYTOPATH CONCENTRATE TECH: CPT | Mod: 26 | Performed by: PATHOLOGY

## 2021-08-11 PROCEDURE — 88184 FLOWCYTOMETRY/ TC 1 MARKER: CPT | Performed by: NURSE PRACTITIONER

## 2021-08-11 PROCEDURE — 88187 FLOWCYTOMETRY/READ 2-8: CPT | Mod: XS | Performed by: PATHOLOGY

## 2021-08-11 PROCEDURE — 89050 BODY FLUID CELL COUNT: CPT | Performed by: NURSE PRACTITIONER

## 2021-08-11 PROCEDURE — 250N000013 HC RX MED GY IP 250 OP 250 PS 637: Performed by: ANESTHESIOLOGY

## 2021-08-11 PROCEDURE — 88271 CYTOGENETICS DNA PROBE: CPT | Performed by: PEDIATRICS

## 2021-08-11 PROCEDURE — G0452 MOLECULAR PATHOLOGY INTERPR: HCPCS | Mod: 26 | Performed by: PATHOLOGY

## 2021-08-11 PROCEDURE — 250N000009 HC RX 250: Performed by: NURSE ANESTHETIST, CERTIFIED REGISTERED

## 2021-08-11 PROCEDURE — 36592 COLLECT BLOOD FROM PICC: CPT

## 2021-08-11 PROCEDURE — 250N000011 HC RX IP 250 OP 636

## 2021-08-11 PROCEDURE — 85097 BONE MARROW INTERPRETATION: CPT | Performed by: STUDENT IN AN ORGANIZED HEALTH CARE EDUCATION/TRAINING PROGRAM

## 2021-08-11 PROCEDURE — 88311 DECALCIFY TISSUE: CPT | Mod: 26 | Performed by: STUDENT IN AN ORGANIZED HEALTH CARE EDUCATION/TRAINING PROGRAM

## 2021-08-11 PROCEDURE — 999N000131 HC STATISTIC POST-PROCEDURE RECOVERY CARE: Performed by: NURSE PRACTITIONER

## 2021-08-11 PROCEDURE — 82945 GLUCOSE OTHER FLUID: CPT | Performed by: NURSE PRACTITIONER

## 2021-08-11 PROCEDURE — G0463 HOSPITAL OUTPT CLINIC VISIT: HCPCS

## 2021-08-11 PROCEDURE — 88311 DECALCIFY TISSUE: CPT | Mod: TC | Performed by: PEDIATRICS

## 2021-08-11 PROCEDURE — 99215 OFFICE O/P EST HI 40 MIN: CPT | Mod: 25 | Performed by: PHYSICIAN ASSISTANT

## 2021-08-11 PROCEDURE — G0452 MOLECULAR PATHOLOGY INTERPR: HCPCS | Mod: 26 | Performed by: STUDENT IN AN ORGANIZED HEALTH CARE EDUCATION/TRAINING PROGRAM

## 2021-08-11 PROCEDURE — 93306 TTE W/DOPPLER COMPLETE: CPT | Mod: 26 | Performed by: PEDIATRICS

## 2021-08-11 PROCEDURE — 81279 JAK2 GENE TRGT SEQUENCE ALYS: CPT

## 2021-08-11 PROCEDURE — 370N000017 HC ANESTHESIA TECHNICAL FEE, PER MIN: Performed by: NURSE PRACTITIONER

## 2021-08-11 PROCEDURE — 88188 FLOWCYTOMETRY/READ 9-15: CPT | Performed by: PATHOLOGY

## 2021-08-11 PROCEDURE — 258N000003 HC RX IP 258 OP 636: Performed by: NURSE ANESTHETIST, CERTIFIED REGISTERED

## 2021-08-11 PROCEDURE — 88275 CYTOGENETICS 100-300: CPT | Mod: XU | Performed by: PEDIATRICS

## 2021-08-11 PROCEDURE — 88185 FLOWCYTOMETRY/TC ADD-ON: CPT | Performed by: NURSE PRACTITIONER

## 2021-08-11 PROCEDURE — 62270 DX LMBR SPI PNXR: CPT | Performed by: NURSE PRACTITIONER

## 2021-08-11 PROCEDURE — 88108 CYTOPATH CONCENTRATE TECH: CPT | Mod: TC | Performed by: NURSE PRACTITIONER

## 2021-08-11 PROCEDURE — 81270 JAK2 GENE: CPT

## 2021-08-11 PROCEDURE — 36415 COLL VENOUS BLD VENIPUNCTURE: CPT | Performed by: NURSE PRACTITIONER

## 2021-08-11 PROCEDURE — 81261 IGH GENE REARRANGE AMP METH: CPT | Performed by: PEDIATRICS

## 2021-08-11 PROCEDURE — 81267 CHIMERISM ANAL NO CELL SELEC: CPT | Performed by: PEDIATRICS

## 2021-08-11 PROCEDURE — 84157 ASSAY OF PROTEIN OTHER: CPT | Performed by: NURSE PRACTITIONER

## 2021-08-11 PROCEDURE — 88237 TISSUE CULTURE BONE MARROW: CPT | Performed by: PEDIATRICS

## 2021-08-11 PROCEDURE — 93306 TTE W/DOPPLER COMPLETE: CPT

## 2021-08-11 PROCEDURE — 87635 SARS-COV-2 COVID-19 AMP PRB: CPT

## 2021-08-11 PROCEDURE — 81207 BCR/ABL1 GENE MINOR BP: CPT | Performed by: NURSE PRACTITIONER

## 2021-08-11 PROCEDURE — 36591 DRAW BLOOD OFF VENOUS DEVICE: CPT | Performed by: NURSE PRACTITIONER

## 2021-08-11 PROCEDURE — 88291 CYTO/MOLECULAR REPORT: CPT | Performed by: MEDICAL GENETICS

## 2021-08-11 RX ORDER — ACETAMINOPHEN 325 MG/1
650 TABLET ORAL ONCE
Status: COMPLETED | OUTPATIENT
Start: 2021-08-11 | End: 2021-08-11

## 2021-08-11 RX ORDER — EPHEDRINE SULFATE 50 MG/ML
INJECTION, SOLUTION INTRAVENOUS PRN
Status: DISCONTINUED | OUTPATIENT
Start: 2021-08-11 | End: 2021-08-11

## 2021-08-11 RX ORDER — PROPOFOL 10 MG/ML
INJECTION, EMULSION INTRAVENOUS CONTINUOUS PRN
Status: DISCONTINUED | OUTPATIENT
Start: 2021-08-11 | End: 2021-08-11

## 2021-08-11 RX ORDER — FENTANYL CITRATE 50 UG/ML
INJECTION, SOLUTION INTRAMUSCULAR; INTRAVENOUS PRN
Status: DISCONTINUED | OUTPATIENT
Start: 2021-08-11 | End: 2021-08-11

## 2021-08-11 RX ORDER — ACETAMINOPHEN 325 MG/1
TABLET ORAL
Status: DISCONTINUED
Start: 2021-08-11 | End: 2021-08-11 | Stop reason: HOSPADM

## 2021-08-11 RX ORDER — ONDANSETRON 2 MG/ML
INJECTION INTRAMUSCULAR; INTRAVENOUS PRN
Status: DISCONTINUED | OUTPATIENT
Start: 2021-08-11 | End: 2021-08-11

## 2021-08-11 RX ORDER — HEPARIN SODIUM (PORCINE) LOCK FLUSH IV SOLN 100 UNIT/ML 100 UNIT/ML
SOLUTION INTRAVENOUS
Status: COMPLETED
Start: 2021-08-11 | End: 2021-08-11

## 2021-08-11 RX ORDER — PROPOFOL 10 MG/ML
INJECTION, EMULSION INTRAVENOUS PRN
Status: DISCONTINUED | OUTPATIENT
Start: 2021-08-11 | End: 2021-08-11

## 2021-08-11 RX ORDER — SODIUM CHLORIDE, SODIUM LACTATE, POTASSIUM CHLORIDE, CALCIUM CHLORIDE 600; 310; 30; 20 MG/100ML; MG/100ML; MG/100ML; MG/100ML
INJECTION, SOLUTION INTRAVENOUS CONTINUOUS PRN
Status: DISCONTINUED | OUTPATIENT
Start: 2021-08-11 | End: 2021-08-11

## 2021-08-11 RX ADMIN — PROPOFOL 300 MCG/KG/MIN: 10 INJECTION, EMULSION INTRAVENOUS at 14:30

## 2021-08-11 RX ADMIN — ONDANSETRON 0.2 MG: 2 INJECTION INTRAMUSCULAR; INTRAVENOUS at 14:30

## 2021-08-11 RX ADMIN — EPHEDRINE SULFATE 5 MG: 50 INJECTION, SOLUTION INTRAVENOUS at 14:44

## 2021-08-11 RX ADMIN — SODIUM CHLORIDE, POTASSIUM CHLORIDE, SODIUM LACTATE AND CALCIUM CHLORIDE: 600; 310; 30; 20 INJECTION, SOLUTION INTRAVENOUS at 14:34

## 2021-08-11 RX ADMIN — EPHEDRINE SULFATE 10 MG: 50 INJECTION, SOLUTION INTRAVENOUS at 14:50

## 2021-08-11 RX ADMIN — FENTANYL CITRATE 50 MCG: 50 INJECTION, SOLUTION INTRAMUSCULAR; INTRAVENOUS at 14:30

## 2021-08-11 RX ADMIN — SODIUM CHLORIDE, POTASSIUM CHLORIDE, SODIUM LACTATE AND CALCIUM CHLORIDE: 600; 310; 30; 20 INJECTION, SOLUTION INTRAVENOUS at 15:10

## 2021-08-11 RX ADMIN — PROPOFOL 60 MG: 10 INJECTION, EMULSION INTRAVENOUS at 14:30

## 2021-08-11 ASSESSMENT — ENCOUNTER SYMPTOMS: APNEA: 0

## 2021-08-11 ASSESSMENT — MIFFLIN-ST. JEOR: SCORE: 1566.63

## 2021-08-11 ASSESSMENT — PAIN SCALES - GENERAL: PAINLEVEL: NO PAIN (0)

## 2021-08-11 NOTE — ANESTHESIA PREPROCEDURE EVALUATION
Anesthesia Pre-Procedure Evaluation    Patient: Artemio Nino   MRN:     [id number] Gender:   male   Age:    21 year old :      2000        Preoperative Diagnosis: ALL (acute lymphoblastic leukemia) (H) [C91.00]   Procedure(s):  BIOPSY BONE MARROW  LUMBAR PUNCTURE, DIAGNOSTIC     LABS:  CBC:   Lab Results   Component Value Date    WBC 5.3 2021    WBC 5.9 2021    HGB 13.0 (L) 2021    HGB 12.4 (L) 2021    HCT 40.2 2021    HCT 38.4 (L) 2021     (L) 2021     2021     BMP:   Lab Results   Component Value Date     2021     2021    POTASSIUM 3.5 2021    POTASSIUM 4.0 2021    CHLORIDE 109 2021    CHLORIDE 108 2021    CO2 25 2021    CO2 22 2021    BUN 16 2021    BUN 10 2021    CR 0.84 2021    CR 0.86 2021     (H) 2021     (H) 2021     COAGS:   Lab Results   Component Value Date    PTT 52 (H) 2021    INR 1.01 2021    FIBR 578 (H) 2021     POC:   Lab Results   Component Value Date    BGM 98 2020     OTHER:   Lab Results   Component Value Date    ELSA 9.3 2021    PHOS 3.2 2021    MAG 2.3 2021    ALBUMIN 3.5 2021    PROTTOTAL 6.4 (L) 2021     (H) 2021    AST 66 (H) 2021     (H) 2020    ALKPHOS 69 2021    BILITOTAL 0.6 2021    TSH 4.20 (H) 2019    T4 0.88 2019    CRP <2.9 2020        Preop Vitals    BP Readings from Last 3 Encounters:   21 104/70   21 98/60   21 (!) 85/47    Pulse Readings from Last 3 Encounters:   21 67   21 82   21 59      Resp Readings from Last 3 Encounters:   21 16   21 18   21 20    SpO2 Readings from Last 3 Encounters:   21 100%   21 98%   21 99%      Temp Readings from Last 1 Encounters:   21 36.9  C (98.4  F) (Oral)    Ht Readings  "from Last 1 Encounters:   08/11/21 1.753 m (5' 9.02\")      Wt Readings from Last 1 Encounters:   08/11/21 58.1 kg (128 lb 1.4 oz)    Estimated body mass index is 18.91 kg/m  as calculated from the following:    Height as of an earlier encounter on 8/11/21: 1.753 m (5' 9.02\").    Weight as of an earlier encounter on 8/11/21: 58.1 kg (128 lb 1.4 oz).     LDA:  Port A Cath Single 06/10/20 Right Chest wall (Active)   Site Assessment WDL 06/01/21 1000   Dressing Status clean;dry;intact 05/21/21 1330   Dressing Intervention New dressing 05/26/21 1113   Dressing change due 02/15/21 02/08/21 1201   Line Status Blood return noted;Infusing;Heparin locked 06/01/21 1000   Access Date 06/01/21 06/01/21 1000   Access Attempts 1 06/01/21 1000   Gauge Power noncoring 90 degree bend;20 gauge;3/4 inch 06/01/21 1000   Needle Change Due 06/18/21 05/21/21 1130   Line Necessity Yes, meets criteria 02/09/21 0800   De-Access Date 06/01/21 06/01/21 1000   Date to be Reflushed 06/29/21 06/01/21 1000   Extravasation? No 05/19/21 1200   Number of days: 427        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)      History of blood transfusion      WPW (Aaron-Parkinson-White syndrome) 03/2018      Past Surgical History:   Procedure Laterality Date     BONE MARROW BIOPSY, BONE SPECIMEN, NEEDLE/TROCAR Right 11/27/2018    Procedure: bone marrow biopsy;  Surgeon: Jacqueline Fitzgerald NP;  [facility]      BONE MARROW BIOPSY, BONE SPECIMEN, NEEDLE/TROCAR N/A 2/8/2019    Procedure: BIOPSY BONE MARROW;  Surgeon: Lisa Workman NP;  Location: [facility]      BONE MARROW BIOPSY, BONE SPECIMEN, NEEDLE/TROCAR N/A 5/16/2019    Procedure: BIOPSY, BONE MARROW;  Surgeon: Lilia López APRN CNP;  Location: [facility]     BONE MARROW BIOPSY, BONE SPECIMEN, NEEDLE/TROCAR N/A 11/7/2019    Procedure: Bone marrow biopsy;  Surgeon: Jacqueline Shin NP;  [facility]      BONE MARROW BIOPSY, " BONE SPECIMEN, NEEDLE/TROCAR Right 6/10/2020    Procedure: BIOPSY, BONE MARROW;  Surgeon: aCndido Han PA-C;  [facility]: [facility]      BONE MARROW BIOPSY, BONE SPECIMEN, NEEDLE/TROCAR N/A 8/24/2020    Procedure: Bone marrow biopsy;  Surgeon: Jacqueline Shin, NP;  [facility]      BONE MARROW BIOPSY, BONE SPECIMEN, NEEDLE/TROCAR N/A 9/25/2020    Procedure: BIOPSY, BONE MARROW;  Surgeon: Lilia López APRN CNP;  [facility]      BONE MARROW BIOPSY, BONE SPECIMEN, NEEDLE/TROCAR N/A 3/11/2021    Procedure: BIOPSY BONE MARROW;  Surgeon: Lisa Workman NP;  [facility]      BONE MARROW BIOPSY, BONE SPECIMEN, NEEDLE/TROCAR N/A 5/21/2021    Procedure: BIOPSY, BONE MARROW;  Surgeon: Jacqueline Shin NP;  [facility]      ESOPHAGOSCOPY, GASTROSCOPY, DUODENOSCOPY (EGD), COMBINED N/A 2/22/2019    Procedure: Upper endoscopy with biopsy;  Surgeon: Magdalene Hobson MD;  [facility]      INSERT CATHETER VASCULAR ACCESS N/A 7/20/2020    Procedure: NON -APHERESIS Double lumen tunneled central burns line placement;  Surgeon: Pato Gómez PA-C;  [facility]      INSERT CATHETER VASCULAR ACCESS N/A 2/8/2021    Procedure: apheresis catheter placement;  Surgeon: Pato Gómez PA-C;  [facility]      INSERT CATHETER VASCULAR ACCESS APHERESIS CHILD N/A 6/10/2020    Procedure: Large Bore Double Lumen NON TUNNELED Apheresis Catheter placement;  Surgeon: Link Rios PA-C;  [facility]      INSERT CATHETER VASCULAR ACCESS DOUBLE LUMEN CHILD N/A 10/26/2018    Procedure: double lumen tunneled line placement;  Surgeon: Rex Valente MD;  [facility]      INSERT PORT VASCULAR ACCESS N/A 6/10/2020    Procedure: Port placement;  Surgeon: Link Rios PA-C;  [facility]      IR CHEST PORT PLACEMENT > 5 YRS OF AGE  6/10/2020     IR CVC NON TUNNEL  LINE REMOVAL  6/12/2020     IR CVC NON TUNNEL PLACEMENT  6/10/2020     IR CVC TUNNEL PLACEMENT > 5 YRS OF AGE  7/20/2020     IR CVC TUNNEL PLACEMENT > 5 YRS OF AGE  2/8/2021     IR CVC TUNNEL REMOVAL LEFT  2/8/2019     IR CVC TUNNEL REMOVAL LEFT  8/24/2020     IR CVC TUNNEL REMOVAL LEFT  4/9/2021     IR PORT REMOVAL LEFT  5/16/2019     O CLAVICLE LEFT Left     fracture with surgical repair and plate/screws     ORTHOPEDIC SURGERY      femur     REMOVE CATHETER VASCULAR ACCESS N/A 2/8/2019    Procedure: Tunneled line removal;  Surgeon: Krystal Esparza MD;  [facility]      REMOVE CATHETER VASCULAR ACCESS N/A 8/24/2020    Procedure: tunneled line removal;  Surgeon: Siri Hernandez PA-C;  [facility]      REMOVE CATHETER VASCULAR ACCESS Left 4/9/2021    Procedure: REMOVAL, VASCULAR ACCESS CATHETER;  Surgeon: Link Rios PA-C;  [facility]      REMOVE PORT VASCULAR ACCESS N/A 5/16/2019    Procedure: REMOVAL, VASCULAR ACCESS PORT;  Surgeon: Link Rios PA-C;  [facility]      Allergies   Allergen Reactions     Blood Transfusion Related (Informational Only) Other (See Comments)     Patient has a history of a clinically significant antibody against RBC antigens.  A delay in compatible RBCs may occur.Stem cell transplant patient.  Give type O RBCs.  Requires Benadryl and tylenol as premed for platelets and RBCs for hx of hives     Voriconazole Photosensitivity     Significant rash - do not rechallenge        Anesthesia Evaluation    ROS/Med Hx    No history of anesthetic complications  (-) malignant hyperthermia and tuberculosis  Comments: Artemio is scheduled for BM Bx and diagnostic LP    He has had several uncomplicated anesthetics for his AML cares.     Met with Isael and his step mother. He is NPO and has done well with prior anesthesia.     Cardiovascular Findings   Comments: Stable with WPW syndrome; jan 26, 2021: TTE: Patient after bone  marrow transplant. Normal echocardiogram. The left and  right ventricles have normal chamber size, wall thickness, and systolic  function. The calculated biplane left ventricular ejection fraction is 64 %. A  catheter is seen with its tip in the right atrium. No pericardial effusion.  Technically difficult study due to lung artifact.;    EKG on same day: Sinus bradycardia  Aaron-Parkinson-White  Abnormal ECG  When compared with ECG of 12-AUG-2020 11:22,  Aaron-Parkinson-White is again Present    He is stable.     Neuro Findings - negative ROS    Pulmonary Findings   (-) asthma and apnea    HENT Findings - negative HENT ROS    Skin Findings - negative skin ROS      GI/Hepatic/Renal Findings   (-) GERD    Endocrine/Metabolic Findings - negative ROS      Genetic/Syndrome Findings - negative genetics/syndromes ROS    Hematology/Oncology Findings   (+) cancer and hematopoietic stem cell transplant  Comments: Is on treatment for AML     Additional Notes    -- Blood Transfusion Related (Informational Only) -- Other (See Comments)    --  Patient has a history of a clinically significant             antibody against RBC antigens.  A delay in             compatible RBCs may occur.Stem cell transplant             patient.  Give type O RBCs.Requires Benadryl and             tylenol as premed for platelets and RBCs for hx of             hives   -- Voriconazole -- Photosensitivity    --  Significant rash - do not rechallenge      Current Outpatient Medications:  dronabinol (MARINOL) 2.5 MG capsule  LORazepam (ATIVAN) 1 MG tablet  magnesium oxide (MAG-OX) 400 (241.3 Mg) MG tablet  pantoprazole (PROTONIX) 40 MG EC tablet  ruxolitinib (JAKAFI) 20 MG TABS tablet  sulfamethoxazole-trimethoprim (BACTRIM) 400-80 MG tablet  tacrolimus (GENERIC EQUIVALENT) 1 MG capsule  tacrolimus (GENERIC EQUIVALENT) 1 mg/mL suspension            PHYSICAL EXAM:   Mental Status/Neuro: A/A/O   Airway: Facies: Feasible  Mallampati: I  Mouth/Opening:  Full  TM distance: > 6 cm  Neck ROM: Full   Respiratory: Auscultation: CTAB     Resp. Rate: Normal     Resp. Effort: Normal      CV: Rhythm: Regular  Rate: Age appropriate  Heart: Normal Sounds  Edema: None   Comments: Dental: no acute issues                     Anesthesia Plan    ASA Status:  3   NPO Status:  NPO Appropriate    Anesthesia Type: General.     - Airway: Native airway   Induction: Intravenous, Propofol.   Maintenance: TIVA.   Techniques and Equipment:     - Lines/Monitors: Port in situ     Consents    Anesthesia Plan(s) and associated risks, benefits, and realistic alternatives discussed. Questions answered and patient/representative(s) expressed understanding.     - Discussed with:  Patient      - Extended Intubation/Ventilatory Support Discussed: No.      - Patient is DNR/DNI Status: No    Use of blood products discussed: No .     Postoperative Care    Pain management: IV analgesics, Oral pain medications.   PONV prophylaxis: Background Propofol Infusion     Comments:    He requests anesthesia and the step mother is in agreement. Procedures and risks explained. They understood and consented. Qs answered.          Matthew Strickland MD

## 2021-08-11 NOTE — DISCHARGE INSTRUCTIONS
[facility]   [phone]    Care post Lumbar Puncture     Do not remove bandage/dressing for 24 hours -- after this time they can be removed    No bath, shower or soaking of the dressing for 24 hours    Activity as tolerated by the patient    Diet as able to tolerated    May use Tylenol as needed for pain control -- DO NOT use Ibuprofen    Can apply icepack to the site for discomfort -- no more than 10 minutes at a time    If bleeding presents apply pressure for 5 minutes    Call [phone] ask for Peds BMT/Hem/Onc fellow on call if complications arise including:    persistent bleeding    fever greater than 100.5    Pain    Lumbar punctures can cause headache. If the pain is not controlled with Tylenol (acetaminophen) please call the Peds BMT/Hem/Onc fellow on call      [facility]  [phone]    Care for Bone Marrow Biopsy    Do not remove bandage/dressing for 24 hours -- after this time they can be removed. If Steri-strips are presents they can stay on until they fall off    No bath, shower or soaking of the dressing for 24 hours    Activity as tolerated by the patient    Diet as able to tolerate    May use Tylenol as needed for pain control    Can apply icepack to the site for discomfort -- no more than 10 minutes at a time    If bleeding presents, apply pressure for 5 minutes    Call [phone] ask for Peds BMT/Hem/Onc fellow on call if complications arise including:     persistent bleeding    fever greater than 100.5    pain

## 2021-08-11 NOTE — NURSING NOTE
"Chief Complaint   Patient presents with     RECHECK     Patient here today for H & P      /70 (BP Location: Right arm, Patient Position: Sitting, Cuff Size: Adult Regular)   Pulse 67   Temp 98.4  F (36.9  C) (Oral)   Resp 16   Ht 1.753 m (5' 9.02\")   Wt 58.1 kg (128 lb 1.4 oz)   SpO2 100%   BMI 18.91 kg/m      No Pain (0)  Data Unavailable    I have reviewed the patients medication and allergy list.    Patient needs refills: no    Dressing change needed? No    EKG needed? Yes    Zhane Frost CMA  August 11, 2021  "

## 2021-08-11 NOTE — H&P (VIEW-ONLY)
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 He is now day +176 s/p umbilical cord blood transplant. S/p MSD BMT November 2018 (relapsed at 1.5 years post transplant. 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    He is here today with his father for his six month evaluations and bone marrow biopsy/LP. He has a history of asymptomatic Aaron-Parkinson-White that has not required interventions. He has an echocardiogram follow up scheduled today. He has been followed at [facility] without recent illness or infections. Afebrile without URI symptoms. He received a Gammagard IV infusion on 7/2/21 for an IgG level < 500. He had recent oropharyngeal candidiasis that resolved with the addition of fluconazole. Eating/drinking well. No acid reflux, he stopped taking protonix. No nausea, emesis, diarrhea or pain concerns. No known Covid exposures. He has not received covid or influenza vaccinations. He has completed a tacrolimus taper without evidence of GVHD. He was seen in Urgent Care on 8/6 following an injury to his right great toe while playing soccer. An xray was obtained and negative for fracture. His toe is bruised but he has full ROM. He is blood product transfusion independent.    Review of Systems: Pertinent positives include those mentioned in interval events. A complete review of systems was performed and is otherwise negative.      Medications:  Bactrim DS po bid on Mon and Tue  Cholecalciferol 2000 units (50 mcg) po daily  Fluconazole 400 mg po daily  Ruxolitinib 20 mg po bid  Gammagard IV prn for IgG < 500 (last on 7/2/21)    Allergies: Voriconazole,  "blood products    Social History: Isael lives with his grandparents in [place], [place]. He is currently unemployed. He plays in two soccer leagues.    Family History: No changes per Isael    Physical Exam:  /70 (BP Location: Right arm, Patient Position: Sitting, Cuff Size: Adult Regular)   Pulse 67   Temp 98.4  F (36.9  C) (Oral)   Resp 16   Ht 1.753 m (5' 9.02\")   Wt 58.1 kg (128 lb 1.4 oz)   SpO2 100%   BMI 18.91 kg/m    GEN: Sitting on exam table in NAD. Pleasant and cooperative. Father present  HEENT: Head NC/AT, sclerae clear, PERRL, EOMI, nares patent, OP clear without lesions, MMM  NECK: Supple. No cervical lymphadenopathy  CARD: RRR, normal S1/S2 without murmur. Cap refill < 2 sec  RESP: Lungs clear to auscultation bilaterally, normal work of breathing, no adventitious lung sounds  ABD: Soft, NT, ND, no organomegaly or masses palpated, BS+  EXTREM: WWP, MAEE  SKIN: Right great toe bruised from soccer injury (full ROM). No other erythema, rash or bruising.  NEURO: No focal deficits, CN II-XII grossly intact.  ACCESS: Port    Labs:  Covid test result pending. 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 He is now day +176 s/p umbilical cord blood transplant.     Isael appears clinically well and has no current health complaints. He remains on daily Ruxolitinib. He has no signs of GVHD and has completed a tacrolimus taper. Bone marrow biopsy/LP and echocardiogram today.    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 Of note, his initial post CAR-T therapy was " complicated by Grade III CRS necessitating Tociluzimab x3, dopamine pressor support, and steroids. No CRS, neurotoxicity or infectious complications from [place] CAR-T. He received preparative regimen per [id number] with Thiotepa, Fludarabine, Busulfan, and ATG and received a UCBT on 2/16/21. He engrafted on day +20. His Day +21 peripheral VNTRs (3/9) and bone marrow chimerism (3/11) reveals 100% donor chimerism and his flow, morphology, and NGS is negative. FISH negative for ETV6 and JAK2. Day 100 shows 100% engrafted.  - Continue ruxolitinib 20mg BID with planned administration for 1 year.  - Day +180 bone marrow biopsy and lumbar puncture today. Dr. Solomon to determine which tests to order.   - History of significant spinal headaches; consider using Candelario needle with lumbar punctures.   - Peripheral blood engraftment studies to be obtained in the sedation unit.  - IVIG given at [facility] on 7/2 for an IgG level < 500 copies     Risk for GVHD: MMF previously discontinued per protocol.  - Completed tacrolimus wean without evidence of GVHD.    FEN/Renal:  Regular diet as tolerated. Eating well. No longer requires an appetite stimulant.  - CMP to be drawn in the sedation unit    Cardiovascular: History of asymptomatic WPW syndrome (diagnosed 2018), no interventions to date.   - Echocardiogram follow up today.     Heme: Isael is blood product transfusion independent.   - CBC to be drawn in the sedation unit  - He requires blood product premedications    Infectious Disease:    - Fungal ppx: Continue fluconazole while absolute T-cell count remains low. oropharyngeal candidiasis resolved.  - PJP ppx: Continue bactrim    Past infections:  He has a history of C. Diff.       GI: He is no longer having s/s of acid reflux, protonix stopped.   - No nausea, emesis or diarrhea    Neuro: No current concerns.    MS: Injured right great toe playing soccer. No fracture. Toe is healing well.    Fertility: Sperm collected  previously for fertility preservation.    Candido Han PA-C  Pediatric Blood and Marrow Transplant Program  [facility]    I spent a total of 60 minutes with Artemio Nino on the date of encounter doing chart review, history and exam, review of labs/imaging, discussion with the family, documentation and further activities as noted above.       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

## 2021-08-11 NOTE — PROCEDURES
BMT Bone Marrow Biopsy Procedure Note  August 11, 2021 3:29 PM    DIAGNOSIS: ALL     PROCEDURE: Unilateral Bone Marrow Biopsy and Unilateral Aspirate    SITE: Pediatric Sedation Suite    Patient s identification was positively verified by verbal identification and invasive procedure safety checklist was completed.  Informed consent was obtained. Following the administration of propofol as sedation, patient was placed in the  right lateral decubitus position and prepped and draped in a sterile manner.  Approximately 3 cc of 1% Lidocaine was used over the left posterior iliac spine.  Following this a 3 mm incision was made. Trephine bone marrow core(s) was (were) obtained from the [facility]. Bone marrow aspirates were obtained from the [facility]. Aspirates were sent for morphology, immunophenotyping, cytogenetics and molecular diagnostics RFLP, BCR-ABL and gene rearrangement.  A total of approximately 32 ml of marrow was aspirated.  Following this procedure a sterile dressing was applied to the bone marrow biopsy site(s). The patient was placed in the supine position to maintain pressure on the biopsy site. Post-procedure wound care instructions were given. The patient tolerated the procedure well with no discomfort.  Complications: None      BMT Lumbar Puncture Procedure Note    August 11, 2021    Procedure: Lumbar Puncture to obtain CSF     Diagnosis: ALL    Vitals reviewed:  YES    Informed Consent: Artemio Nino was identified by facial recognition and ID arm band. Procedure, benefits, risks, and alternatives explained to the patient who verbalized understanding of the information and agreed to proceed with lumbar puncture. Risks include bleeding, headache, and or infection.  Patient safety checklist completed.    Labs: Reviewed:      Platelet count: Recent Labs   Lab Test 08/11/21  1403   PLT 82*       Description: A time out was performed prior to the procedure. The patient was placed in the lateral decubitus  position. Anatomic landmarks were identified by palpation. The L3-4 disc space was prepped and draped in a sterile fashion. First attempt with Candelario needle did not pass beyond vertebral body. First attempt with a BD 3.5 inch 22 gauge needle was successful. 5 mL spinal fluid collected. Specimen appears clear and colorless. Specimen sent for cell count and differential, cytology, protein, glucose and flow cytometry.  The needle was removed and a bandage was applied to the site.  Patient tolerated well.    Complications: No     Disposition: Patient will remain on back for 1 hour post procedure. Avoid soaking in a tub tonight.  Call if develops headaches, fevers and or chills.     Both procedures performed by:   BENJA Chen  [facility]  Pediatric Blood and Marrow Transplant

## 2021-08-11 NOTE — PROGRESS NOTES
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 He is now day +176 s/p umbilical cord blood transplant. S/p MSD BMT November 2018 (relapsed at 1.5 years post transplant. 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    He is here today with his father for his six month evaluations and bone marrow biopsy/LP. He has a history of asymptomatic Aaron-Parkinson-White that has not required interventions. He has an echocardiogram follow up scheduled today. He has been followed at [facility] without recent illness or infections. Afebrile without URI symptoms. He received a Gammagard IV infusion on 7/2/21 for an IgG level < 500. He had recent oropharyngeal candidiasis that resolved with the addition of fluconazole. Eating/drinking well. No acid reflux, he stopped taking protonix. No nausea, emesis, diarrhea or pain concerns. No known Covid exposures. He has not received covid or influenza vaccinations. He has completed a tacrolimus taper without evidence of GVHD. He was seen in Urgent Care on 8/6 following an injury to his right great toe while playing soccer. An xray was obtained and negative for fracture. His toe is bruised but he has full ROM. He is blood product transfusion independent.    Review of Systems: Pertinent positives include those mentioned in interval events. A complete review of systems was performed and is otherwise negative.      Medications:  Bactrim DS po bid on Mon and Tue  Cholecalciferol 2000 units (50 mcg) po daily  Fluconazole 400 mg po daily  Ruxolitinib 20 mg po bid  Gammagard IV prn for IgG < 500 (last on 7/2/21)    Allergies: Voriconazole,  "blood products    Social History: Isael lives with his grandparents in [place], [place]. He is currently unemployed. He plays in two soccer leagues.    Family History: No changes per Isael    Physical Exam:  /70 (BP Location: Right arm, Patient Position: Sitting, Cuff Size: Adult Regular)   Pulse 67   Temp 98.4  F (36.9  C) (Oral)   Resp 16   Ht 1.753 m (5' 9.02\")   Wt 58.1 kg (128 lb 1.4 oz)   SpO2 100%   BMI 18.91 kg/m    GEN: Sitting on exam table in NAD. Pleasant and cooperative. Father present  HEENT: Head NC/AT, sclerae clear, PERRL, EOMI, nares patent, OP clear without lesions, MMM  NECK: Supple. No cervical lymphadenopathy  CARD: RRR, normal S1/S2 without murmur. Cap refill < 2 sec  RESP: Lungs clear to auscultation bilaterally, normal work of breathing, no adventitious lung sounds  ABD: Soft, NT, ND, no organomegaly or masses palpated, BS+  EXTREM: WWP, MAEE  SKIN: Right great toe bruised from soccer injury (full ROM). No other erythema, rash or bruising.  NEURO: No focal deficits, CN II-XII grossly intact.  ACCESS: Port    Labs:  Covid test result pending. 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 He is now day +176 s/p umbilical cord blood transplant.     Isael appears clinically well and has no current health complaints. He remains on daily Ruxolitinib. He has no signs of GVHD and has completed a tacrolimus taper. Bone marrow biopsy/LP and echocardiogram today.    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 Of note, his initial post CAR-T therapy was " complicated by Grade III CRS necessitating Tociluzimab x3, dopamine pressor support, and steroids. No CRS, neurotoxicity or infectious complications from [place] CAR-T. He received preparative regimen per [id number] with Thiotepa, Fludarabine, Busulfan, and ATG and received a UCBT on 2/16/21. He engrafted on day +20. His Day +21 peripheral VNTRs (3/9) and bone marrow chimerism (3/11) reveals 100% donor chimerism and his flow, morphology, and NGS is negative. FISH negative for ETV6 and JAK2. Day 100 shows 100% engrafted.  - Continue ruxolitinib 20mg BID with planned administration for 1 year.  - Day +180 bone marrow biopsy and lumbar puncture today. Dr. Solomon to determine which tests to order.   - History of significant spinal headaches; consider using Candelario needle with lumbar punctures.   - Peripheral blood engraftment studies to be obtained in the sedation unit.  - IVIG given at [facility] on 7/2 for an IgG level < 500 copies     Risk for GVHD: MMF previously discontinued per protocol.  - Completed tacrolimus wean without evidence of GVHD.    FEN/Renal:  Regular diet as tolerated. Eating well. No longer requires an appetite stimulant.  - CMP to be drawn in the sedation unit    Cardiovascular: History of asymptomatic WPW syndrome (diagnosed 2018), no interventions to date.   # EKG: Normal Sinus Rhythm with Aaron-Parkinson-White (known history of WPW)  - Echocardiogram follow up today.     Heme: Isael is blood product transfusion independent.   - CBC to be drawn in the sedation unit  - He requires blood product premedications    Infectious Disease:    - Fungal ppx: Continue fluconazole while absolute T-cell count remains low. oropharyngeal candidiasis resolved.  - PJP ppx: Continue bactrim    Past infections:  He has a history of C. Diff.       GI: He is no longer having s/s of acid reflux, protonix stopped.   - No nausea, emesis or diarrhea    Neuro: No current concerns.    MS: Injured right great toe playing  soccer. No fracture. Toe is healing well.    Fertility: Sperm collected previously for fertility preservation.    Candido Han PA-C  Pediatric Blood and Marrow Transplant Program  [facility]    I spent a total of 60 minutes with Artemio Nino on the date of encounter doing chart review, history and exam, review of labs/imaging, discussion with the family, documentation and further activities as noted above.       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

## 2021-08-11 NOTE — ANESTHESIA POSTPROCEDURE EVALUATION
Patient: Artemio Nino    Procedure(s):  BIOPSY BONE MARROW  LUMBAR PUNCTURE, DIAGNOSTIC    Diagnosis:ALL (acute lymphoblastic leukemia) (H) [C91.00]  Diagnosis Additional Information: No value filed.    Anesthesia Type:  General    Note:  Disposition: Outpatient   Postop Pain Control: Uneventful            Sign Out: Well controlled pain   PONV: No   Neuro/Psych: Uneventful            Sign Out: Acceptable/Baseline neuro status   Airway/Respiratory: Uneventful            Sign Out: Acceptable/Baseline resp. status   CV/Hemodynamics: Uneventful            Sign Out: Acceptable CV status; No obvious hypovolemia; No obvious fluid overload   Other NRE: NONE   DID A NON-ROUTINE EVENT OCCUR? No    Event details/Postop Comments:  Awakening satisfactorily; strong; breathing well; oriented; comfortable; says will be going home with his step mother; says he will relax at home this evening. No complaints or complications.            Last vitals:  Vitals Value Taken Time   BP 95/61 08/11/21 1524   Temp 36.2  C (97.2  F) 08/11/21 1524   Pulse 79 08/11/21 1524   Resp 18 08/11/21 1524   SpO2 98 % 08/11/21 1524       Electronically Signed By: Matthew Strickland MD  August 11, 2021  3:38 PM

## 2021-08-11 NOTE — ANESTHESIA CARE TRANSFER NOTE
Patient: Artemio Nino    Procedure(s):  BIOPSY BONE MARROW  LUMBAR PUNCTURE, DIAGNOSTIC    Diagnosis: ALL (acute lymphoblastic leukemia) (H) [C91.00]  Diagnosis Additional Information: No value filed.    Anesthesia Type:   General     Note:    Oropharynx: spontaneously breathing  Level of Consciousness: iatrogenic sedation  Oxygen Supplementation: nasal cannula  Level of Supplemental Oxygen (L/min / FiO2): 3  Independent Airway: airway patency satisfactory and stable  Dentition: dentition unchanged  Vital Signs Stable: post-procedure vital signs reviewed and stable  Report to RN Given: handoff report given  Patient transferred to:  Recovery    Handoff Report: Identifed the Patient, Identified the Reponsible Provider, Reviewed the pertinent medical history, Discussed the surgical course, Reviewed Intra-OP anesthesia mangement and issues during anesthesia, Set expectations for post-procedure period and Allowed opportunity for questions and acknowledgement of understanding      Vitals:  Vitals Value Taken Time   BP     Temp     Pulse     Resp     SpO2         Electronically Signed By: SNOW Tolentino CRNA  August 11, 2021  3:14 PM

## 2021-08-12 ENCOUNTER — CARE COORDINATION (OUTPATIENT)
Dept: TRANSPLANT | Facility: CLINIC | Age: 21
End: 2021-08-12

## 2021-08-12 LAB
APPEARANCE CSF: CLEAR
COLOR CSF: COLORLESS
PATH REPORT.COMMENTS IMP SPEC: NORMAL
PATH REPORT.FINAL DX SPEC: NORMAL
PATH REPORT.FINAL DX SPEC: NORMAL
PATH REPORT.MICROSCOPIC SPEC OTHER STN: NORMAL
PATH REPORT.MICROSCOPIC SPEC OTHER STN: NORMAL
PATH REPORT.RELEVANT HX SPEC: NORMAL
PATH REPORT.RELEVANT HX SPEC: NORMAL
PATH REV: NORMAL
RBC # CSF MANUAL: 0 /UL (ref 0–2)
TUBE # CSF: 3
WBC # CSF MANUAL: 0 /UL (ref 0–5)

## 2021-08-12 NOTE — PROGRESS NOTES
Received a phone call from patient's mother requesting a follow-up phone call with Dr. Solomon to discuss NGS testing.    Participated in a conference call with Dr. Solomon and patient's mother Mervat. Dr Solomon dicussed the rationale for not doing NGS testing post BMT as this is not our standard practice, and the literature doesn't support this testing outside of CAR-T and clinical trials.   Dr. Solomon offered to facilitate a conference call between mother, primary oncologist Gabino Street and BMT team to discuss results and plans moving forward. Mervat was going to connect with Isael and let us know next week what their family's wishes are.   Mom expressed frustration and issues with trust with the [facility] team. Dr. Solomon offered a transfer of follow-up care back to primary oncologist Gabino Street if that was Isael and the family's wishes. Mervat was going to connect with Isael about this and get back to our team at his 8/18 appointments.   Mervat was receptive of the conversation and appreciative of the opportunity to speak with Dr. Solomon and myself. Will follow-up with family after next week's visit.

## 2021-08-13 LAB
PATH REPORT.COMMENTS IMP SPEC: NORMAL
PATH REPORT.FINAL DX SPEC: NORMAL
PATH REPORT.FINAL DX SPEC: NORMAL
PATH REPORT.GROSS SPEC: NORMAL
PATH REPORT.GROSS SPEC: NORMAL
PATH REPORT.MICROSCOPIC SPEC OTHER STN: NORMAL
PATH REPORT.RELEVANT HX SPEC: NORMAL
PATH REPORT.RELEVANT HX SPEC: NORMAL

## 2021-08-16 LAB — CULTURE HARVEST COMPLETE DATE: NORMAL

## 2021-08-18 ENCOUNTER — ONCOLOGY VISIT (OUTPATIENT)
Dept: TRANSPLANT | Facility: CLINIC | Age: 21
End: 2021-08-18
Attending: PEDIATRICS
Payer: COMMERCIAL

## 2021-08-18 VITALS
HEIGHT: 69 IN | SYSTOLIC BLOOD PRESSURE: 101 MMHG | RESPIRATION RATE: 18 BRPM | HEART RATE: 66 BPM | WEIGHT: 125.88 LBS | DIASTOLIC BLOOD PRESSURE: 69 MMHG | OXYGEN SATURATION: 99 % | BODY MASS INDEX: 18.64 KG/M2 | TEMPERATURE: 98.3 F

## 2021-08-18 DIAGNOSIS — Z94.81 S/P ALLOGENEIC BONE MARROW TRANSPLANT (H): Primary | ICD-10-CM

## 2021-08-18 DIAGNOSIS — C91.01 ACUTE LYMPHOBLASTIC LEUKEMIA (ALL) IN REMISSION (H): ICD-10-CM

## 2021-08-18 PROCEDURE — 99215 OFFICE O/P EST HI 40 MIN: CPT | Performed by: PEDIATRICS

## 2021-08-18 PROCEDURE — G0463 HOSPITAL OUTPT CLINIC VISIT: HCPCS

## 2021-08-18 PROCEDURE — 99417 PROLNG OP E/M EACH 15 MIN: CPT | Performed by: PEDIATRICS

## 2021-08-18 RX ORDER — ACETAMINOPHEN 160 MG
TABLET,DISINTEGRATING ORAL
COMMUNITY
Start: 2021-06-28

## 2021-08-18 RX ORDER — FLUCONAZOLE 200 MG/1
400 TABLET ORAL
COMMUNITY
Start: 2019-01-10 | End: 2022-02-08

## 2021-08-18 ASSESSMENT — PAIN SCALES - GENERAL: PAINLEVEL: MODERATE PAIN (4)

## 2021-08-18 ASSESSMENT — MIFFLIN-ST. JEOR: SCORE: 1553.5

## 2021-08-18 NOTE — NURSING NOTE
"Chief Complaint   Patient presents with     RECHECK     6 month post BMT       /69 (BP Location: Right arm, Patient Position: Sitting, Cuff Size: Adult Regular)   Pulse 66   Temp 98.3  F (36.8  C) (Oral)   Resp 18   Ht 5' 8.82\" (174.8 cm)   Wt 125 lb 14.1 oz (57.1 kg)   SpO2 99%   BMI 18.69 kg/m      Latoya De Oliveira CMA  August 18, 2021  "

## 2021-08-18 NOTE — PROGRESS NOTES
"Dr. Manjinder Paniagua  [facility] [place] [address]     Name: Artemio Nino  MRN: [id number]  : 2000     Dear Care Team,     We had the pleasure of seeing your patient Artemio Nino in the [facility] for routine post-BMT follow-up. 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 He presented today for a routine follow-up appointment.       Since last week Cj has had a headache since his LP. He has had these in the past. Caffeine does not work for him, and he has had a blood patch x 1in the past. He is self-medicating with tylenol. Yesterday he played soccer with a headache.     Has blood draw at [facility] q month.    Review of Systems: Pertinent positives include those mentioned in interval events. A complete review of systems was performed and is otherwise negative.       Medications:  Bactrim  Vit D  Flucon  Ruxolitinib 20 mg po bid  Gammagard prn IgG < 500     Physical Exam:   /69 (BP Location: Right arm, Patient Position: Sitting, Cuff Size: Adult Regular)   Pulse 66   Temp 98.3  F (36.8  C) (Oral)   Resp 18   Ht 1.748 m (5' 8.82\")   Wt 57.1 kg (125 lb 14.1 oz)   SpO2 99%   BMI 18.69 kg/m      GEN: Sitting in chair, in no acute distress.Talkative with examiner, cooperative. Father present. Mother on phone.  HEENT: Hair returning, NC/AT, Pupils equal and reactive, MMM - no sores, lesions, or actively bleeding gums, nares patent.    CARD: RRR, no m/r/g, normal S1/S2. Port in place on R side of chest. Galvan site with bandage over it.  No issues.   RESP: CTAB, normal WOB, no adventitious sounds  ABD: Soft, NT/ND, flat, NABS  EXTREM: MAEE, WWP  SKIN: Dry, no rashes, no lesions  NEURO: Talkative, CNII-XII grossly intact     Labs: CBC looks good with exception of platelets down to 82 from prior 144.    Assessment/Plan:     Artemio is a 21 yo male with history of " multiply relapsed ALL now day 183 s/p UCBT following MAC per 2013-09. His post-transplant complications have included C.difficile.  He has no signs of GVHD.     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) and PLAT-05 (CAR19/22) at [facility] (loss of CD22 CAR and rejection of CD19 directed CAR without evidence of disease day +21). Of note, his initial post CAR-T therapy was complicated by Grade III CRS necessitating Tociluzimab x3, dopamine pressor support, and steroids. No CRS, neurotoxicity or infectious complications from [facility] CAR-T. He received preparative regimen per MT2013-09 with Thiotepa, Fludarabine, Busulfan, and ATG and received a UCBT on 2/16/21. He engrafted on day +20. His Day +21 peripheral VNTRs (3/9) and bone marrow chimerism (3/11) reveals 100% donor chimerism and his flow, morphology, and NGS is negative. FISH negative for ETV6 and JAK2.     Day 100 shows 100% engrafted.   Day 180 shows 100% engrafted.    Marrow MRd neg  CSF MRD neg by flow  FISH for JAK2 neg  Ph p190 PCR pending    Ruxolitinib 20mg BID with planned administration for 1 year     [facility]. Possible medication effect.     FEN/Renal:   Regular diet as tolerated.  He has lost weight.  He says he feels full quickly  We will continue Marinol 2.5 BID.     Cardiovascular: History of asymptomatic WPW syndrome (diagnosed 2018): no interventions to date. Cardiology following.Follow up ECHO in 6 mos (7/2021). Normotensive.     Infectious Disease:  Stopped fluconazole and valtrex.  Continue bactrim.  He has a history of C. Diff.      Neuro: No longer utilizing oxycodone. L shoulder pain completely resolved. Of note, history of significant spinal headaches; consider using Candelario needle with lumbar punctures.      Fertility: Sperm collected previously for  fertility preservation.     Headache: consider reevaluation in 2-3 days if not better. May need narcotics or blood patch.     Time spent 60 min with another 60 minutes reviewing labs, charts, results, interpretion, speaking with colleague on the same day of the visit.    Vinod Solomon MD PhD

## 2021-08-18 NOTE — PATIENT INSTRUCTIONS
Return to [facility] for labs and exam with Dr. Solomon for 1 year anniversary BAN, February 2022 (to be scheduled by complex PEDS BMT schedulers)  Infusion needs: none  Patient has NO line, to be drawn off of per lab.   Medication changes: none  Care plan changes: continue monthly provider appointments and labs with referring oncology team at [facility]   Contact information  During business hours (7:30am-4:30pm):   To leave a non-urgent voicemail: call triage line [phone]    To call for time-sensitive needs or concerns : call clinic  [phone]  Evenings after 4:30pm, weekends, and holidays:   For any needs or concerns: call for BMT fellow at [phone] in the case of an emergency  Thank you!     To be scheduled by complex schedulers as of 08/19 @ 2:32pm. SADI

## 2021-08-19 LAB
CULTURE HARVEST COMPLETE DATE: NORMAL
CULTURE HARVEST COMPLETE DATE: NORMAL

## 2021-08-23 ENCOUNTER — TRANSFERRED RECORDS (OUTPATIENT)
Dept: HEALTH INFORMATION MANAGEMENT | Facility: CLINIC | Age: 21
End: 2021-08-23

## 2021-08-25 LAB
INTERPRETATION: NORMAL
MDL NUMBER: NORMAL
SIGNIFICANT RESULTS: NORMAL
TEST DETAILS, MDL: NORMAL

## 2021-09-02 LAB — INTERPRETATION: NORMAL

## 2021-09-19 ENCOUNTER — HEALTH MAINTENANCE LETTER (OUTPATIENT)
Age: 21
End: 2021-09-19

## 2021-10-12 ENCOUNTER — TELEPHONE (OUTPATIENT)
Dept: TRANSPLANT | Facility: CLINIC | Age: 21
End: 2021-10-12

## 2021-10-12 NOTE — TELEPHONE ENCOUNTER
Mervat called to discuss vaccine recommendations (covid and flu). She talked with an POLLO on 10/11, and wanted to clarify information given to her. After consulting with pharmacist, I confirmed with Mervat that Isael can get both his flu and Covid vaccines and that it is ok to get them at the same time. Mom expressed frustration that [facility] not [facility] to discuss this with the BMT team. Assured mom that Isael will see pharmacy at the +1year time point and to call with further questions.

## 2021-11-08 ENCOUNTER — TRANSFERRED RECORDS (OUTPATIENT)
Dept: HEALTH INFORMATION MANAGEMENT | Facility: CLINIC | Age: 21
End: 2021-11-08
Payer: COMMERCIAL

## 2021-11-08 LAB
ALT SERPL-CCNC: 43 U/L (ref 0–55)
AST SERPL-CCNC: 35 U/L (ref 5–34)
CREATININE (EXTERNAL): 0.82 MG/DL (ref 0.57–1.11)
GLUCOSE (EXTERNAL): 97 MG/DL (ref 74–100)
POTASSIUM (EXTERNAL): 4.4 MEQ/L (ref 3.4–5.1)
TSH SERPL-ACNC: 2.88 UIU/ML (ref 0.3–4.2)

## 2021-12-06 DIAGNOSIS — C91.02 ACUTE LYMPHOBLASTIC LEUKEMIA (ALL) IN RELAPSE (H): Primary | ICD-10-CM

## 2021-12-06 DIAGNOSIS — C91.01 ACUTE LYMPHOBLASTIC LEUKEMIA (ALL) IN REMISSION (H): ICD-10-CM

## 2021-12-06 DIAGNOSIS — Z94.81 S/P ALLOGENEIC BONE MARROW TRANSPLANT (H): ICD-10-CM

## 2021-12-14 NOTE — PLAN OF CARE
Problem: Patient Care Overview  Goal: Plan of Care/Patient Progress Review  Tmax 98. Other vitals stable. Prn Morphine given for abdominal pain rated 6/10 with relief. Mother attentive at bedside and updated on POC. All questions answered. Hourly rounding completed. Continue to closely monitor.       Hemostasis: Electrocautery Render Post-Care Instructions In Note?: no Prep Text (Optional): Prior to removal the treatment areas were prepped in the usual fashion.\\n\\n1% LEB used for numbing the lesion prior to extraction Extraction Method: 21 gauge needle Acne Type: Milial cysts Detail Level: Detailed Render Number Of Lesions Treated: yes Post-Care Instructions: I reviewed with the patient in detail post-care instructions. Patient is to keep the treatment areaas dry overnight, and then apply bacitracin twice daily until healed. Patient may apply hydrogen peroxide soaks to remove any crusting. Consent was obtained and risks were reviewed including but not limited to scarring, infection, bleeding, scabbing, incomplete removal, and allergy to anesthesia.

## 2021-12-15 DIAGNOSIS — Z76.82 BONE MARROW TRANSPLANT CANDIDATE: Primary | ICD-10-CM

## 2021-12-17 NOTE — TELEPHONE ENCOUNTER
FUTURE VISIT INFORMATION      FUTURE VISIT INFORMATION:    Date: 2/1/22    Time: 12:20pm    Location: [facility]  REFERRAL INFORMATION:    Referring provider:   Vinod Solomon MD     Referring providers clinic:  [facility]    Reason for visit/diagnosis  1 year post BMT Pt needing Eye Exam     RECORDS REQUESTED FROM:       Clinic name Comments Records Status Imaging Status   [facility] CLinic Referral 12/15/21 EP[facility]

## 2022-01-05 NOTE — TELEPHONE ENCOUNTER
FUTURE VISIT INFORMATION      FUTURE VISIT INFORMATION:    Date: 2/8/22    Time: 1:40pm    Location: [facility]  REFERRAL INFORMATION:    Referring provider:   Vinod Solomon MD     Referring providers clinic:  [facility]    Reason for visit/diagnosis  1 year post BMT Pt needing Eye Exam      RECORDS REQUESTED FROM:         Clinic name Comments Records Status Imaging Status   [facility] CLinic Referral 12/15/21 EP[facility]

## 2022-01-09 ENCOUNTER — HEALTH MAINTENANCE LETTER (OUTPATIENT)
Age: 22
End: 2022-01-09

## 2022-01-13 NOTE — TELEPHONE ENCOUNTER
RECORDS RECEIVED FROM: internal/ce    DATE RECEIVED: 2.7.22   NOTES (FOR ALL VISITS) STATUS DETAILS   OFFICE NOTES from referring provider internal  Vinod Solomon MD   OFFICE NOTES from other specialist internal  BMT-    ED NOTES na    OPERATIVE REPORT  (thyroid, pituitary, adrenal, parathyroid) na    MEDICATION LIST internal     IMAGING      DEXASCAN na    MRI (BRAIN) na    XR (Chest) na    CT (HEAD/NECK/CHEST/ABDOMEN) internal  1.26.21, 8/7/20, 7/15/20, 6.10.20   NUCLEAR  na    ULTRASOUND (HEAD/NECK) na    LABS     DIABETES: HBGA1C, CREATININE, FASTING LIPIDS, MICROALBUMIN URINE, POTASSIUM, TSH, T4    THYROID: TSH, T4, CBC, THYRODLONULIN, TOTAL T3, FREE T4, CALCITONIN, CEA Internal/ce   Calcium- 3.12.21  Cbc- 1.19.21  CREATININE- 1/8/21  Glucose- 11/8/21  BMT lab- 3.2.21  Lipid- 2.9.21  TSH 11/8/21

## 2022-01-24 ENCOUNTER — OFFICE VISIT (OUTPATIENT)
Dept: NEUROPSYCHOLOGY | Facility: CLINIC | Age: 22
End: 2022-01-24
Payer: COMMERCIAL

## 2022-01-24 DIAGNOSIS — C91.01 ACUTE LYMPHOID LEUKEMIA IN REMISSION (H): Primary | ICD-10-CM

## 2022-01-24 PROCEDURE — 96133 NRPSYC TST EVAL PHYS/QHP EA: CPT | Performed by: CLINICAL NEUROPSYCHOLOGIST

## 2022-01-24 PROCEDURE — 99207 PR NO CHARGE LOS: CPT | Performed by: CLINICAL NEUROPSYCHOLOGIST

## 2022-01-24 PROCEDURE — 96132 NRPSYC TST EVAL PHYS/QHP 1ST: CPT | Performed by: CLINICAL NEUROPSYCHOLOGIST

## 2022-01-24 PROCEDURE — 96136 PSYCL/NRPSYC TST PHY/QHP 1ST: CPT | Mod: HN | Performed by: CLINICAL NEUROPSYCHOLOGIST

## 2022-01-24 PROCEDURE — 96137 PSYCL/NRPSYC TST PHY/QHP EA: CPT | Mod: HN | Performed by: CLINICAL NEUROPSYCHOLOGIST

## 2022-01-24 NOTE — LETTER
1/24/2022      RE: Artemio Nino  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 In June 2020 (19 months post-transplant) he had a relapse of leukemia, treated with additional chemotherapy, lymphodepletion and CD19 CAR T-cell infusion, and subsequently underwent a second BMT on 02/08/2021. He has been in continuous remission since that time. Mr. Nino was seen for a neuropsychological evaluation in the Pediatric Neuropsychology Clinic as part of his multi-disciplinary follow-up care 1-year status post BMT.     Previous Evaluations  Mr. Nino was initially seen for a neuropsychological evaluation by Dr. Demian Hills on 10/29/2019. Results from that evaluation indicated that he was functioning within the broadly average range across all assessed domains, including attention, working memory, executive functioning, fine motor, and visuomotor integration abilities. His overall cognitive/thinking ability was also average.  No concerns regarding Mr. Nino s emotional and behavioral functioning were endorsed. No recommendations were offered at that time. Mr. Nino was seen for a follow-up neuropsychological evaluation with Dr. John Singleton on 1/27/2021. Results at that time showed preserved cognitive skills across all domains of functioning, with a slight relative weakness in organizing and spontaneously retrieving complex visual information. Strategies were suggested to  help address this mild area of weaknesses (e.g., verbalizing task approach).     BRIEF BACKGROUND AND INTERIM UPDATE  Updated information was gathered via an interview with Mr. Nino, review of available records, and self-report questionnaires. A comprehensive review of his early medical/developmental history can be found in the initial evaluation report from this clinic (dated 10/29/2019), and thus will be only be briefly reviewed below.     Medical History  Prior to Mr. Nino s cancer diagnosis, his medical history was uncomplicated. As a brief review of his cancer history, Mr. Nino was diagnosed on 2/13/2018 with acute lymphoblastic leukemia (ALL) after cytogenetic analysis showed IKZF1 deletion, which suggested Ph-like ALL. Molecular evaluation revelated a translocation indicative of JAK2 activation. He received intrathecal chemotherapy according to [facility] protocols [id number] and [id number]. 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 Following this, Mr. Nino received four days of total body irradiation (TBI; dosage = 1320 cGy plus 400 cGy testicular boost) followed by matched sibling donor transplant on 11/2/2018. He engrafted, with no signs of GVHD. In June 2020, medical records indicate that Mr. Nino had a relapse of leukemia (19 months post BMT).  He completed maintenance chemotherapy and underwent lymphodepletion followed by CD19 CAR T-cell infusion on 7/27/20. 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 Medical records reflect that his Day 30 marrow was negative for minimal residual disease; however, at day 60 he demonstrated 8% relapsed disease, CD22 positive, CD19 dim. Per Mr. Nino s report, the  family disputes whether this constituted relapse. Mr. Nino received his second transplant (02/08/2021; [facility] source). Since this transplant, he has been in generally good health. He continues to be followed by oncology, pulmonology, ophthalmology, endocrinology, and radiology to monitor for late effects of therapies. At the time of this evaluation, he continues to be in remission.    Current Functioning   Mr. Nino described his overall mood as good. He denied any current stressors. He is currently living with his grandparents in [place], [place]. His father also lives in [place] and his mother resides in [place]. He also has an older brother who lives in [place]. Mr. Nino described his relationship with his family members as  good  and noted that he felt well supported by each person. Aside from family members, Mr. Nino indicated that he has a group of friends and has maintained these friendships over many years. He shared that he frequently spends time with friends and enjoys these relationships. He also participates in soccer activities three times per week and is part of a soccer league in the summer. During his free time, he likes to play video games and occasionally watches television.    Mr. Nino is not currently employed. 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 While Mr. Nino previously started one semester of college prior to his diagnosis of ALL, he has not yet returned. At this time, he is prioritizing moving to [place] in order to pursue work in the family business.     Mr. Nino reported that he is largely independent in his medical care. He manages his own medications and appointments. There are no complaints of pain or fatigue. Vision and hearing are reported to be intact. Mr. Nino sleeps about 8 hours a night;  there were no concerns were reported about his sleep habits or sleep quality. He also shared that he has a healthy appetite and eats a variety of foods. Mr. Nino rarely drinks alcohol. He reported that he uses marijuana weekly to aid with cancer treatment-related side effects; per his report, this does not cause interference for him and he denied other substance use.    NEUROPSYCHOLOGICAL ASSESSMENT   Behavioral observations  Mr. Nino was accompanied to the appointment by his father. They  immediately after being greeted by the examiners and Mr. Nino participated independently in all aspects of the appointment. He was casually dressed and appropriately groomed. He was alert and oriented to time, place, and person. Vision and hearing seemed adequate for testing purposes. Mr. Nino presented as a polite and quiet young man. While he did not initiate conversation, he demonstrated good back-and-forth social communication after questions were asked. Speech was clear, fluent, and appropriate in rate, rhythm, and volume. Eye-contact was consistent and directly appropriately. Affect was appropriate in range and intensity, mood appeared neutral (neither elevated nor depressed). No unusual motor mannerisms were observed. Mr. Nino appeared to have adequate language comprehension as task instructions were quickly understood. Thought processes and content were normal and organized. No distorted perceptions were observed. Insight and judgement appeared intact. Mr. Nino was offered breaks from testing but did not choose to take any. Engagement throughout the evaluation was strong. He was attentive throughout and thoughtful in his responding.     Of note, the current evaluation was conducted during the COVID-19 pandemic. As such, the necessary personal protective equipment (PPE) was required to be worn throughout the evaluation. The examiner(s) wore a face mask, shield, and gloves, and Mr. Nino wore a  face mask. Safety procedures including but not limited to the use of PPE may result in increased distraction, anxiety and a diminished capacity for the patient and the examiner(s) to read each other s nonverbal cues. Testing conditions with PPE are not consistent with the usual and customary process of evaluation. Nonetheless, Mr. Nino appeared to put forth his best effort, and the PPE worn did not appear to be of great interference or distraction for him. His behavior during the evaluation was consistent with prior reports and he put forth good effort throughout testing. As such, under these circumstances, the results of this evaluation are considered a valid reflection of Mr. Nino s current level of functioning in the areas assessed.    Neuropsychological Evaluation Methods and Instruments:  Review of Records  Clinical behavioral observations  Clinical Interview  Wechsler Adult Intelligence Scale, 4th Ed.  [place] Verbal Learning Test, Third Edition    Malou-Renae Executive Function System              Color-Word Interference              [place] Making Test  Purdue Pegboard   [employer] Developmental Test of Visual-Motor Integration, 6th Ed.   Drake-Osterrieth Complex Figure Drawing Test and Recognition   Behavior Rating Inventory of Executive Function - Adult Version  Richards Depression Inventory, 2nd Ed.  Richards Anxiety Inventory     SUMMARY AND IMPRESSIONS   Mr. 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 His treatment regimen included two bone marrow transplants, several rounds of intrathecal chemotherapy, CAR-T cell therapy, and total body irradiation (1320 cGy). As such treatment may confer risks to learning, attention, or other neurocognitive skills even after treatment has ended, Mr. Nino was referred by his transplant  team for neuropsychological evaluation to examine his current functioning now that he is one year status post-transplant.    Results from the current evaluation indicates that Mr. Nino s cognitive performance across domains was at or above expectation for young adults his age and consistent with expectations based on his prior performance during evaluations in 2019 and 2021. His overall intellectual functioning measured in the average range, with consistently average scores across verbal and non-verbal (visual-spatial) reasoning tasks. Working memory (the ability to take in information and mentally manipulate it in mind) and processing speed skills also measured in the average range for his age, consistent with prior testing.     Performance on measures of executive functioning (or the set of cognitive functioning skills that are necessary for cognitive control of thoughts, behaviors, and emotions) were consistently average across tasks. This included his ability to sequence information, briefly attend to auditory information and hold information on-line in working memory, inhibit impulsive responses and shift in response to changes in task demands with accuracy and speed. He demonstrated an organized and efficient approach to a visual reproduction task during which he was asked to copy a complex geometric figure, attaining a solidly average score. On self-report questionnaires of attention and executive functioning, Mr. Nino s ratings were entirely within normal limits for his age and not reflective of significant concern.     Mr. Nino s learning and memory for verbal and visual information measured as strong, ranging from average to above average at a level that was consistent with or somewhat better than expectations based on past performance. On a rote verbal list learning task, Mr. Nino showed strong retention after a single presentation of the list and showed strong additional learning over time  (high average). His recall after short and long delay intervals ranged from high average to slightly above average, and he showed good use of efficient strategy in recalling words from the list (i.e., spontaneously recalling words based on categories instead of relying on order of presentation). When presented with items from the list in a recognition (Yes/No) format he also attained an average score. Verbal learning and recall for narrative stories were also solidly average. Visual learning and memory (i.e., reproduction of a complex design after short and long delay periods) was also strong, with scores in the above average range.     Fine motor and visual-motor integration skills measured in the average range, consistent with past performance. While not reflected in his scores, Mr. Nino remarked that he found visuomotor tasks to be more difficult for him than the other tasks, and qualitatively his approach to reproducing drawings was somewhat slow.     In summary, Mr. Nino continues to do very well, displaying strong and stable performance across neurocognitive domains consistent with results of prior testing. While he previously endorsed mild difficulties readjusting to school, Mr. Nino did not endorse any concerns regarding adjustment, mood, or behavioral concerns on the current evaluation. Along with his cognitive and emotional resilience, he also functions independently with his activities of daily living and medical care. Recommendations are offered below to assist Mr. Nino as he plans the next chapter of his young adult life.     Diagnoses  C91.01 Acute lymphoblastic leukemia, in remission    Based on Mr. 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 If he decides to return to college or pursue other higher education, he should know that scholarship  opportunities are available for survivors of childhood cancers. Several options can be found here: [email or web address] or [email or web address]. He could also pursue career counseling or assessment if desired, [email or web address]       Lifestyle Behaviors    Health behaviors are an important means of reducing long-term chronic disease risk and improving health outcomes in childhood cancer survivors. Mr. Nino is encouraged to continue to regularly engage in positive recreational activities, including social (e.g., going out with friends) and physical recreation (e.g., soccer), as well as regularly seeking out cognitively stimulating activities (e.g., learning a new skill), in order to promote healthy aging and emotional well-being. If desired, a one-on-one nutrition education consult for survivors are available for free at: https://www.lls.org/managing-[email or web address]-cancer/food-[email or web address]-nutrition. Mr. Nino should also monitor his marijuana use to ensure this does not become problematic and discuss his use with his physicians. Despite the potential benefits of marijuana for symptom control (e.g., nausea, pain) during and after treatment, studies suggest that cannabis use is associated with reduced neurocognitive functioning. As childhood cancer survivors are also at risk for cardiac- and pulmonary-related late effects, method of cannabis administration (e.g., inhaled vs by mouth) is also important to consider when gauging marijuana s potentially harmful role to future health.    Additional Resources    To learn more about survivorship, Mr. Nino is encouraged to explore the following:  o Websites:  - The [facility] has developed long-term follow-up guidelines for survivors of childhood cancers. These guidelines can help you know what to watch for, what type of screening  should be done, and how late effects may be treated. Mr. Nino can learn more about these guidelines and late effects of cancer treatments at: www.survivorshipguidelines.org/   - As he plans his move to [place], Mr. Nino may also choose to explore the Leukemia and Lymphoma Society of [place] website for resources and support at: https://www.bloodcancers.ca/  o Books  - Childhood Cancer Survivors: A Practical Guide to Your Future by Maddison Rueda, Tonja Jennings, and Rocio Morrell  o Podcasts  - The podcast  The Bloodline with LLS  is also a great resource to listen and learn about ALL and survivorship. This can be heard at: https://www.lls.org/support-resources/lls-podcast    Follow-Up    We would like to see Mr. Nino again in one year for a follow-up evaluation in this clinic to continue to monitor his cognitive and behavioral progress in the context of his medical history.  We will certainly see him sooner if concerns arise.    It has been a pleasure working with Mr. Nino. If there are questions regarding this evaluation, please contact us at [phone].    Latoya Gutierrez M.A.  Pediatric Neuropsychology Intern  [facility]   [facility]     Deandra Shabazz, Ph.D., L.P.  Pediatric Neuropsychologist   [facility]          PEDIATRIC NEUROPSYCHOLOGY CLINIC  CONFIDENTIAL TEST SCORES    Note: These scores are intended for appropriately licensed professionals and should never be interpreted without consideration of the attached narrative report.    Test Results:   Note: The test data listed below use one or more of the following formats:   *Standard Scores have an average of 100 a standard deviation of 15 (the average range is 85 to 115).   *Scaled Scores have an average of 10 and a standard deviation of 3 (the average range is 7 to 13).   *T-Scores have an average range of 50 and a standard deviation of 10  (the average range is 40 to 60).     COGNITIVE FUNCTIONING  Wechsler Adult Intelligence Scale, Fourth Edition   Standard scores from 85 - 115 represent the average range of functioning.  Scaled scores from 7 - 13 represent the average range of functioning.     Index 2019 Standard Score 2021 Standard Score 2022 Standard Score   Verbal Comprehension 102 105 105   Perceptual Reasoning 107 107 105   Working Memory 92 102 97   Processing Speed 97 97 100   Full Scale  104 103      Subtest 2019 Standard Score 2021 Scaled Score 2022 Scaled Score   Similarities 10 10 11   Vocabulary 10 11 10   Information 11 12 12   Block Design 13 13 13   Visual Puzzles 8 10 8   Matrix Reasoning 13 11 12   Digit Span 10 11 10   Arithmetic 7 10 9   Coding 10 9 8   Symbol Search 9 10 12      ATTENTION AND EXECUTIVE FUNCTIONING     Test of Variables of Attention, Visual (2019 only)  Scores from 85 - 115 represent the average range of functioning.          Measure  Quarter 1  Quarter 2  Quarter 3  Quarter 4  Total    Omissions  102  102 105 107  108   Commissions  114  112 120 118  121   Response Time  97 93 115 114 110   Variability  80  106 123 115 103       Malou-Renae Executive Function System Color-Word Interference Test  Scaled Scores from 7 - 13 represent the average range of functioning.     Measure 2019 Scaled Score 2021 Scaled Score 2022 Scaled Score   Color Naming 11 11 10   Word Reading 7 9 9   Inhibition 12 11 12   Inhibition/Switching 8 10 10        Malou-Renae Executive Function System Trail Making Test  Scaled Scores from 7 - 13 represent the average range of functioning.     Measure 2019 Scaled Score 2021 Scaled Score 2022 Scaled Score   Visual Scanning 13 13 12   Number Sequencing 13 13 12   Letter Sequencing 13 14 12   Number-Letter Switching 10 12 11   Motor Speed 12 9 10      Behavior Rating Inventory of Executive Function, Adult Version - Informant/Self Report  T-scores 65 and higher are considered to be in the   clinically significant  range.     Index/Scale 2019 Parent T-Score 2021 Parent T-Score 2022 Self-Report   Inhibit 46 42 40   Shift 51 38 43   Emotional Control 43 39 38   Self-Monitor 59 37 50   Behavioral Regulation Index (ARON) 48 37 40   Initiate 47 39 43   Working Memory 39 39 39   Plan/Organize 44 38 38   Task-Monitor 36 38 40   Organization of Materials 45 37 36   Metacognitive Index (MCI) 41 36 38   Global Executive Composite (GEC) 44 36 38      FINE-MOTOR AND VISUAL-MOTOR FUNCTIONING     Purdue Pegboard  Standard scores from 85 - 115 represent the average range of functioning.     Trial 2021 Pegs Placed 2021 Standard Score 2022 Pegs Placed 2022 Standard Score   Dominant (R) 15 96 15 96   Non-Dominant 15 99 15 99   Both Hands 13 pairs 100 13 pairs 100       Neno-Cecile Developmental Test of Visual Motor Integration, Sixth Edition  Standard scores from 85 - 115 represent the average range of functioning.     2022 Raw Score 2022 Standard Score   29 103      MEMORY/ORIENTATION FUNCTIONING  [place] Verbal Learning Test, Third Edition   Index Scores from 85  - 115 represent the average range of functioning.   Scaled Scores from 7 - 13 represent the average range of functioning.    Measure 2019 Scaled Score 2022 Scaled Score   List A Trial 1 Free Recall 8 13   List A Trial 5 Free Recall 9 12   List B Free Recall - 10   List A Short-Delay Free Recall 9 14   List A Short-Delay Cued Recall 9 13   List A Long-Delay Free Recall 10 12   List A Long-Delay Cued Recall 8 12   Correct Recognition Hits 7 9   False Positives* 9 9   Discriminability 9 9   Total Intrusions* 12 12   Forced Choice - 16       Wechsler Memory Scale, Fourth Edition  Scaled scores from 7 - 13 represent the average range of functioning. Percentiles 16-84 represent the average range of functioning.     Subtest 2021 Scaled Score 2021 Cumulative Percentile   Logical Memory I 9     Logical Memory II 10     Logical Memory Recognition - 26-50   Visual  Reproduction I 14     Visual Reproduction II 10     Visual Reproduction Recognition  - 17-25      Drake Complex Figure Test and Recognition Trial   T-scores from 40 - 60 define the average range of functioning. Percentiles 16-84 represent the average range of functioning.     Task 2021 Raw 2021 T-Score 2021 Percentile  2022 Raw 2022 T-Score 2022 Percentile   Copy 33 - 11-16 36 - >16   Immediate Recall 20 39  - 32 66 -   Delayed Recall 17.5 32  - 32 67 -   Recognition 22 52  - 10 - >16          EMOTIONAL AND BEHAVIORAL FUNCTIONING  For the Clinical Scales on the BASC-3, scores ranging from 60-69 are considered to be in the  at-risk  range and scores of 70 or higher are considered  clinically significant.   For the Adaptive Scales, scores between 30 and 39 are considered to be in the  at-risk  range and scores of 29 or lower are considered  clinically significant.       Richards Depression Inventory, Second Edition  Raw scores from 0-13 are considered to be  minimal depression , scores from 14-19 are considered to be  mild depression , scores from 20-28 are considered to be  moderate depression , and scores from 29-63 are considered to be  severe depression.      Raw Score   0      Richards Anxiety Inventory  Raw scores from 0-7 are considered to be  minimal anxiety , scores from 8-15 are considered to be  mild anxiety , scores from 16-25 are considered to be  moderate anxiety , and scores from 26-63 are considered to be  severe anxiety.      Raw Score   1     Behavioral Assessment System for Children, Third Edition, Self-Report of Personality - Garibaldi     Clinical Scales 2021 T-Score   Adaptive Scales 2021 T-score   Atypicality 45   Relations with Parents 55   Locus of Control 48   Interpersonal Relations 43   Social Stress 53   Self-Esteem 61   Anxiety 36   Self-Reliance 57   Depression 57         Sense of Inadequacy  45   Composite Indices     Somatization 50   Internalizing Problems 47   Attention Problems 49    Inattention/Hyperactivity 47   Hyperactivity 46   Emotional Symptoms Index 44   Sensation Seeking 53   Personal Adjustment 55   Alcohol Abuse 43         Social Maladjustment   65*         *At-risk       Time Spent: Neuropsychological test administration and scoring by a trainee ([id number]) was administered by Latoya Gutierrez on 01/24/2022. Total time spent was 4 hours. Neuropsychological test evaluation services by a licensed psychologist ([id number] and [id number]) were administered by Deandra Shabazz, PhD, LP on 01/24/2022. Total time spent was 4 hours.    CC      Copy to patient  RUSSEL MORALESSERENITY  [address] [address]        [email or web address] [id number], [id number] [phone]

## 2022-01-24 NOTE — LETTER
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 In June 2020 (19 months post-transplant) he had a relapse of leukemia, treated with additional chemotherapy, lymphodepletion and CD19 CAR T-cell infusion, and subsequently underwent a second BMT on 02/08/2021. He has been in continuous remission since that time. Mr. Nino was seen for a neuropsychological evaluation in the Pediatric Neuropsychology Clinic as part of his multi-disciplinary follow-up care 1-year status post BMT.     Previous Evaluations  Mr. Nino was initially seen for a neuropsychological evaluation by Dr. Demian Hills on 10/29/2019. Results from that evaluation indicated that he was functioning within the broadly average range across all assessed domains, including attention, working memory, executive functioning, fine motor, and visuomotor integration abilities. His overall cognitive/thinking ability was also average.  No concerns regarding Mr. Nino s emotional and behavioral functioning were endorsed. No recommendations were offered at that time. Mr. Nino was seen for a follow-up neuropsychological evaluation with Dr. John Singleton on 1/27/2021. Results at that time showed preserved cognitive skills across all domains of functioning, with a slight relative weakness in organizing and spontaneously retrieving complex visual information. Strategies were suggested to  help address this mild area of weaknesses (e.g., verbalizing task approach).     BRIEF BACKGROUND AND INTERIM UPDATE  Updated information was gathered via an interview with Mr. Nino, review of available records, and self-report questionnaires. A comprehensive review of his early medical/developmental history can be found in the initial evaluation report from this clinic (dated 10/29/2019), and thus will be only be briefly reviewed below.     Medical History  Prior to Mr. Nino s cancer diagnosis, his medical history was uncomplicated. As a brief review of his cancer history, Mr. Nino was diagnosed on 2/13/2018 with acute lymphoblastic leukemia (ALL) after cytogenetic analysis showed IKZF1 deletion, which suggested Ph-like ALL. Molecular evaluation revelated a translocation indicative of JAK2 activation. He received intrathecal chemotherapy according to [facility] protocols [id number] and [id number]. 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 Following this, Mr. Nino received four days of total body irradiation (TBI; dosage = 1320 cGy plus 400 cGy testicular boost) followed by matched sibling donor transplant on 11/2/2018. He engrafted, with no signs of GVHD. In June 2020, medical records indicate that Mr. Nino had a relapse of leukemia (19 months post BMT).  He completed maintenance chemotherapy and underwent lymphodepletion followed by CD19 CAR T-cell infusion on 7/27/20. 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 Medical records reflect that his Day 30 marrow was negative for minimal residual disease; however, at day 60 he demonstrated 8% relapsed disease, CD22 positive, CD19 dim. Per Mr. Nino s report, the  family disputes whether this constituted relapse. Mr. Nino received his second transplant (02/08/2021; [facility] source). Since this transplant, he has been in generally good health. He continues to be followed by oncology, pulmonology, ophthalmology, endocrinology, and radiology to monitor for late effects of therapies. At the time of this evaluation, he continues to be in remission.    Current Functioning   Mr. Nino described his overall mood as good. He denied any current stressors. He is currently living with his grandparents in [place], [place]. His father also lives in [place] and his mother resides in [place]. He also has an older brother who lives in [place]. Mr. Nino described his relationship with his family members as  good  and noted that he felt well supported by each person. Aside from family members, Mr. Nino indicated that he has a group of friends and has maintained these friendships over many years. He shared that he frequently spends time with friends and enjoys these relationships. He also participates in soccer activities three times per week and is part of a soccer league in the summer. During his free time, he likes to play video games and occasionally watches television.    Mr. Nino is not currently employed. 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 While Mr. Nino previously started one semester of college prior to his diagnosis of ALL, he has not yet returned. At this time, he is prioritizing moving to [place] in order to pursue work in the family business.     Mr. Nino reported that he is largely independent in his medical care. He manages his own medications and appointments. There are no complaints of pain or fatigue. Vision and hearing are reported to be intact. Mr. Nino sleeps about 8 hours a night;  there were no concerns were reported about his sleep habits or sleep quality. He also shared that he has a healthy appetite and eats a variety of foods. Mr. Nino rarely drinks alcohol. He reported that he uses marijuana weekly to aid with cancer treatment-related side effects; per his report, this does not cause interference for him and he denied other substance use.    NEUROPSYCHOLOGICAL ASSESSMENT   Behavioral observations  Mr. Nino was accompanied to the appointment by his father. They  immediately after being greeted by the examiners and Mr. Nino participated independently in all aspects of the appointment. He was casually dressed and appropriately groomed. He was alert and oriented to time, place, and person. Vision and hearing seemed adequate for testing purposes. Mr. Nino presented as a polite and quiet young man. While he did not initiate conversation, he demonstrated good back-and-forth social communication after questions were asked. Speech was clear, fluent, and appropriate in rate, rhythm, and volume. Eye-contact was consistent and directly appropriately. Affect was appropriate in range and intensity, mood appeared neutral (neither elevated nor depressed). No unusual motor mannerisms were observed. Mr. Nino appeared to have adequate language comprehension as task instructions were quickly understood. Thought processes and content were normal and organized. No distorted perceptions were observed. Insight and judgement appeared intact. Mr. Nino was offered breaks from testing but did not choose to take any. Engagement throughout the evaluation was strong. He was attentive throughout and thoughtful in his responding.     Of note, the current evaluation was conducted during the COVID-19 pandemic. As such, the necessary personal protective equipment (PPE) was required to be worn throughout the evaluation. The examiner(s) wore a face mask, shield, and gloves, and Mr. Nino wore a  face mask. Safety procedures including but not limited to the use of PPE may result in increased distraction, anxiety and a diminished capacity for the patient and the examiner(s) to read each other s nonverbal cues. Testing conditions with PPE are not consistent with the usual and customary process of evaluation. Nonetheless, Mr. Nino appeared to put forth his best effort, and the PPE worn did not appear to be of great interference or distraction for him. His behavior during the evaluation was consistent with prior reports and he put forth good effort throughout testing. As such, under these circumstances, the results of this evaluation are considered a valid reflection of Mr. Nino s current level of functioning in the areas assessed.    Neuropsychological Evaluation Methods and Instruments:  Review of Records  Clinical behavioral observations  Clinical Interview  Wechsler Adult Intelligence Scale, 4th Ed.  [place] Verbal Learning Test, Third Edition    Malou-Renae Executive Function System              Color-Word Interference              [place] Making Test  Purdue Pegboard   CyberPatroly-BuPaixie.neta Developmental Test of Visual-Motor Integration, 6th Ed.   Drake-Osterrieth Complex Figure Drawing Test and Recognition   Behavior Rating Inventory of Executive Function - Adult Version  Richards Depression Inventory, 2nd Ed.  Richards Anxiety Inventory     SUMMARY AND IMPRESSIONS   Mr. 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 His treatment regimen included two bone marrow transplants, several rounds of intrathecal chemotherapy, CAR-T cell therapy, and total body irradiation (1320 cGy). As such treatment may confer risks to learning, attention, or other neurocognitive skills even after treatment has ended, Mr. Nino was referred by his transplant  team for neuropsychological evaluation to examine his current functioning now that he is one year status post-transplant.    Results from the current evaluation indicates that Mr. Nino s cognitive performance across domains was at or above expectation for young adults his age and consistent with expectations based on his prior performance during evaluations in 2019 and 2021. His overall intellectual functioning measured in the average range, with consistently average scores across verbal and non-verbal (visual-spatial) reasoning tasks. Working memory (the ability to take in information and mentally manipulate it in mind) and processing speed skills also measured in the average range for his age, consistent with prior testing.     Performance on measures of executive functioning (or the set of cognitive functioning skills that are necessary for cognitive control of thoughts, behaviors, and emotions) were consistently average across tasks. This included his ability to sequence information, briefly attend to auditory information and hold information on-line in working memory, inhibit impulsive responses and shift in response to changes in task demands with accuracy and speed. He demonstrated an organized and efficient approach to a visual reproduction task during which he was asked to copy a complex geometric figure, attaining a solidly average score. On self-report questionnaires of attention and executive functioning, Mr. Nino s ratings were entirely within normal limits for his age and not reflective of significant concern.     Mr. Nino s learning and memory for verbal and visual information measured as strong, ranging from average to above average at a level that was consistent with or somewhat better than expectations based on past performance. On a rote verbal list learning task, Mr. Nino showed strong retention after a single presentation of the list and showed strong additional learning over time  (high average). His recall after short and long delay intervals ranged from high average to slightly above average, and he showed good use of efficient strategy in recalling words from the list (i.e., spontaneously recalling words based on categories instead of relying on order of presentation). When presented with items from the list in a recognition (Yes/No) format he also attained an average score. Verbal learning and recall for narrative stories were also solidly average. Visual learning and memory (i.e., reproduction of a complex design after short and long delay periods) was also strong, with scores in the above average range.     Fine motor and visual-motor integration skills measured in the average range, consistent with past performance. While not reflected in his scores, Mr. Nino remarked that he found visuomotor tasks to be more difficult for him than the other tasks, and qualitatively his approach to reproducing drawings was somewhat slow.     In summary, Mr. Nino continues to do very well, displaying strong and stable performance across neurocognitive domains consistent with results of prior testing. While he previously endorsed mild difficulties readjusting to school, Mr. Nino did not endorse any concerns regarding adjustment, mood, or behavioral concerns on the current evaluation. Along with his cognitive and emotional resilience, he also functions independently with his activities of daily living and medical care. Recommendations are offered below to assist Mr. Nino as he plans the next chapter of his young adult life.     Diagnoses  C91.01 Acute lymphoblastic leukemia, in remission    Based on Mr. 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 If he decides to return to college or pursue other higher education, he should know that scholarship  opportunities are available for survivors of childhood cancers. Several options can be found here: [email or web address] or [email or web address]. He could also pursue career counseling or assessment if desired, [email or web address]       Lifestyle Behaviors    Health behaviors are an important means of reducing long-term chronic disease risk and improving health outcomes in childhood cancer survivors. Mr. Nino is encouraged to continue to regularly engage in positive recreational activities, including social (e.g., going out with friends) and physical recreation (e.g., soccer), as well as regularly seeking out cognitively stimulating activities (e.g., learning a new skill), in order to promote healthy aging and emotional well-being. If desired, a one-on-one nutrition education consult for survivors are available for free at: https://www.lls.org/managing-your-cancer/food-and-nutrition. Mr. Nino should also monitor his marijuana use to ensure this does not become problematic and discuss his use with his physicians. Despite the potential benefits of marijuana for symptom control (e.g., nausea, pain) during and after treatment, studies suggest that cannabis use is associated with reduced neurocognitive functioning. As childhood cancer survivors are also at risk for cardiac- and pulmonary-related late effects, method of cannabis administration (e.g., inhaled vs by mouth) is also important to consider when gauging marijuana s potentially harmful role to future health.    Additional Resources    To learn more about survivorship, Mr. Nino is encouraged to explore the following:  o Websites:  - The [facility] has developed long-term follow-up guidelines for survivors of childhood cancers. These guidelines can help you know what to watch for, what type of screening  should be done, and how late effects may be treated. Mr. Nino can learn more about these guidelines and late effects of cancer treatments at: www.survivorshipguidelines.org/   - As he plans his move to [place], Mr. Nino may also choose to explore the Leukemia and Lymphoma Society of [place] website for resources and support at: https://www.bloodcancers.ca/  o Books  - Childhood Cancer Survivors: A Practical Guide to Your Future by Maddison Rueda, Tonja Jennings, and Rocio Morrell  o Podcasts  - The podcast  The Bloodline with LLS  is also a great resource to listen and learn about ALL and survivorship. This can be heard at: https://www.lls.org/support-resources/lls-podcast    Follow-Up    We would like to see Mr. Nino again in one year for a follow-up evaluation in this clinic to continue to monitor his cognitive and behavioral progress in the context of his medical history.  We will certainly see him sooner if concerns arise.    It has been a pleasure working with Mr. Nino. If there are questions regarding this evaluation, please contact us at [phone].    Latoya Gutierrez M.A.  Pediatric Neuropsychology Intern  [facility]   [facility]     Deandra Shabazz, Ph.D., L.P.  Pediatric Neuropsychologist   [facility]          PEDIATRIC NEUROPSYCHOLOGY CLINIC  CONFIDENTIAL TEST SCORES    Note: These scores are intended for appropriately licensed professionals and should never be interpreted without consideration of the attached narrative report.    Test Results:   Note: The test data listed below use one or more of the following formats:   *Standard Scores have an average of 100 a standard deviation of 15 (the average range is 85 to 115).   *Scaled Scores have an average of 10 and a standard deviation of 3 (the average range is 7 to 13).   *T-Scores have an average range of 50 and a standard deviation of 10  (the average range is 40 to 60).     COGNITIVE FUNCTIONING  Wechsler Adult Intelligence Scale, Fourth Edition   Standard scores from 85 - 115 represent the average range of functioning.  Scaled scores from 7 - 13 represent the average range of functioning.     Index 2019 Standard Score 2021 Standard Score 2022 Standard Score   Verbal Comprehension 102 105 105   Perceptual Reasoning 107 107 105   Working Memory 92 102 97   Processing Speed 97 97 100   Full Scale  104 103      Subtest 2019 Standard Score 2021 Scaled Score 2022 Scaled Score   Similarities 10 10 11   Vocabulary 10 11 10   Information 11 12 12   Block Design 13 13 13   Visual Puzzles 8 10 8   Matrix Reasoning 13 11 12   Digit Span 10 11 10   Arithmetic 7 10 9   Coding 10 9 8   Symbol Search 9 10 12      ATTENTION AND EXECUTIVE FUNCTIONING     Test of Variables of Attention, Visual (2019 only)  Scores from 85 - 115 represent the average range of functioning.          Measure  Quarter 1  Quarter 2  Quarter 3  Quarter 4  Total    Omissions  102  102 105 107  108   Commissions  114  112 120 118  121   Response Time  97 93 115 114 110   Variability  80  106 123 115 103       Malou-Renae Executive Function System Color-Word Interference Test  Scaled Scores from 7 - 13 represent the average range of functioning.     Measure 2019 Scaled Score 2021 Scaled Score 2022 Scaled Score   Color Naming 11 11 10   Word Reading 7 9 9   Inhibition 12 11 12   Inhibition/Switching 8 10 10        Malou-Renae Executive Function System Trail Making Test  Scaled Scores from 7 - 13 represent the average range of functioning.     Measure 2019 Scaled Score 2021 Scaled Score 2022 Scaled Score   Visual Scanning 13 13 12   Number Sequencing 13 13 12   Letter Sequencing 13 14 12   Number-Letter Switching 10 12 11   Motor Speed 12 9 10      Behavior Rating Inventory of Executive Function, Adult Version - Informant/Self Report  T-scores 65 and higher are considered to be in the   clinically significant  range.     Index/Scale 2019 Parent T-Score 2021 Parent T-Score 2022 Self-Report   Inhibit 46 42 40   Shift 51 38 43   Emotional Control 43 39 38   Self-Monitor 59 37 50   Behavioral Regulation Index (ARON) 48 37 40   Initiate 47 39 43   Working Memory 39 39 39   Plan/Organize 44 38 38   Task-Monitor 36 38 40   Organization of Materials 45 37 36   Metacognitive Index (MCI) 41 36 38   Global Executive Composite (GEC) 44 36 38      FINE-MOTOR AND VISUAL-MOTOR FUNCTIONING     Purdue Pegboard  Standard scores from 85 - 115 represent the average range of functioning.     Trial 2021 Pegs Placed 2021 Standard Score 2022 Pegs Placed 2022 Standard Score   Dominant (R) 15 96 15 96   Non-Dominant 15 99 15 99   Both Hands 13 pairs 100 13 pairs 100       Neno-Cecile Developmental Test of Visual Motor Integration, Sixth Edition  Standard scores from 85 - 115 represent the average range of functioning.     2022 Raw Score 2022 Standard Score   29 103      MEMORY/ORIENTATION FUNCTIONING  [place] Verbal Learning Test, Third Edition   Index Scores from 85  - 115 represent the average range of functioning.   Scaled Scores from 7 - 13 represent the average range of functioning.    Measure 2019 Scaled Score 2022 Scaled Score   List A Trial 1 Free Recall 8 13   List A Trial 5 Free Recall 9 12   List B Free Recall - 10   List A Short-Delay Free Recall 9 14   List A Short-Delay Cued Recall 9 13   List A Long-Delay Free Recall 10 12   List A Long-Delay Cued Recall 8 12   Correct Recognition Hits 7 9   False Positives* 9 9   Discriminability 9 9   Total Intrusions* 12 12   Forced Choice - 16       Wechsler Memory Scale, Fourth Edition  Scaled scores from 7 - 13 represent the average range of functioning. Percentiles 16-84 represent the average range of functioning.     Subtest 2021 Scaled Score 2021 Cumulative Percentile   Logical Memory I 9     Logical Memory II 10     Logical Memory Recognition - 26-50   Visual  Reproduction I 14     Visual Reproduction II 10     Visual Reproduction Recognition  - 17-25      Drake Complex Figure Test and Recognition Trial   T-scores from 40 - 60 define the average range of functioning. Percentiles 16-84 represent the average range of functioning.     Task 2021 Raw 2021 T-Score 2021 Percentile  2022 Raw 2022 T-Score 2022 Percentile   Copy 33 - 11-16 36 - >16   Immediate Recall 20 39  - 32 66 -   Delayed Recall 17.5 32  - 32 67 -   Recognition 22 52  - 10 - >16          EMOTIONAL AND BEHAVIORAL FUNCTIONING  For the Clinical Scales on the BASC-3, scores ranging from 60-69 are considered to be in the  at-risk  range and scores of 70 or higher are considered  clinically significant.   For the Adaptive Scales, scores between 30 and 39 are considered to be in the  at-risk  range and scores of 29 or lower are considered  clinically significant.       Richards Depression Inventory, Second Edition  Raw scores from 0-13 are considered to be  minimal depression , scores from 14-19 are considered to be  mild depression , scores from 20-28 are considered to be  moderate depression , and scores from 29-63 are considered to be  severe depression.      Raw Score   0      Richards Anxiety Inventory  Raw scores from 0-7 are considered to be  minimal anxiety , scores from 8-15 are considered to be  mild anxiety , scores from 16-25 are considered to be  moderate anxiety , and scores from 26-63 are considered to be  severe anxiety.      Raw Score   1     Behavioral Assessment System for Children, Third Edition, Self-Report of Personality - [place]     Clinical Scales 2021 T-Score   Adaptive Scales 2021 T-score   Atypicality 45   Relations with Parents 55   Locus of Control 48   Interpersonal Relations 43   Social Stress 53   Self-Esteem 61   Anxiety 36   Self-Reliance 57   Depression 57         Sense of Inadequacy  45   Composite Indices     Somatization 50   Internalizing Problems 47   Attention Problems 49    Inattention/Hyperactivity 47   Hyperactivity 46   Emotional Symptoms Index 44   Sensation Seeking 53   Personal Adjustment 55   Alcohol Abuse 43         Social Maladjustment   65*         *At-risk       Time Spent: Neuropsychological test administration and scoring by a trainee ([id number]) was administered by Latoya Gutierrez on 01/24/2022. Total time spent was 4 hours. Neuropsychological test evaluation services by a licensed psychologist ([id number]) were administered by Deandra Shabazz, PhD, LP on 01/24/2022. Total time spent was 4 hours.      Copy to patient  ANDREWRUSSEL PEREZSERENITY CHURCH  [address] [facility] [address]        Deandra Shabazz, PhD LP

## 2022-01-24 NOTE — LETTER
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 In June 2020 (19 months post-transplant) he had a relapse of leukemia, treated with additional chemotherapy, lymphodepletion and CD19 CAR T-cell infusion, and subsequently underwent a second BMT on 02/08/2021. He has been in continuous remission since that time. Mr. Nino was seen for a neuropsychological evaluation in the Pediatric Neuropsychology Clinic as part of his multi-disciplinary follow-up care 1-year status post BMT.     Previous Evaluations  Mr. Nino was initially seen for a neuropsychological evaluation by Dr. Demian Hills on 10/29/2019. Results from that evaluation indicated that he was functioning within the broadly average range across all assessed domains, including attention, working memory, executive functioning, fine motor, and visuomotor integration abilities. His overall cognitive/thinking ability was also average.  No concerns regarding Mr. Nino s emotional and behavioral functioning were endorsed. No recommendations were offered at that time. Mr. Nino was seen for a follow-up neuropsychological evaluation with Dr. John Singleton on 1/27/2021. Results at that time showed preserved cognitive skills across all domains of functioning, with a slight relative weakness in organizing and spontaneously retrieving complex visual information. Strategies were suggested to  help address this mild area of weaknesses (e.g., verbalizing task approach).     BRIEF BACKGROUND AND INTERIM UPDATE  Updated information was gathered via an interview with Mr. Nino, review of available records, and self-report questionnaires. A comprehensive review of his early medical/developmental history can be found in the initial evaluation report from this clinic (dated 10/29/2019), and thus will be only be briefly reviewed below.     Medical History  Prior to Mr. Nino s cancer diagnosis, his medical history was uncomplicated. As a brief review of his cancer history, Mr. Nino was diagnosed on 2/13/2018 with acute lymphoblastic leukemia (ALL) after cytogenetic analysis showed IKZF1 deletion, which suggested Ph-like ALL. Molecular evaluation revelated a translocation indicative of JAK2 activation. He received intrathecal chemotherapy according to [facility] protocols [id number] and [id number]. 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 Following this, Mr. Nino received four days of total body irradiation (TBI; dosage = 1320 cGy plus 400 cGy testicular boost) followed by matched sibling donor transplant on 11/2/2018. He engrafted, with no signs of GVHD. In June 2020, medical records indicate that Mr. Nino had a relapse of leukemia (19 months post BMT).  He completed maintenance chemotherapy and underwent lymphodepletion followed by CD19 CAR T-cell infusion on 7/27/20. 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 Medical records reflect that his Day 30 marrow was negative for minimal residual disease; however, at day 60 he demonstrated 8% relapsed disease, CD22 positive, CD19 dim. Per Mr. Nino s report, the  family disputes whether this constituted relapse. Mr. Nino received his second transplant (02/08/2021; [facility] source). Since this transplant, he has been in generally good health. He continues to be followed by oncology, pulmonology, ophthalmology, endocrinology, and radiology to monitor for late effects of therapies. At the time of this evaluation, he continues to be in remission.    Current Functioning   Mr. Nino described his overall mood as good. He denied any current stressors. He is currently living with his grandparents in [place], [place]. His father also lives in [place] and his mother resides in [place]. He also has an older brother who lives in [place]. Mr. Nino described his relationship with his family members as  good  and noted that he felt well supported by each person. Aside from family members, Mr. Nino indicated that he has a group of friends and has maintained these friendships over many years. He shared that he frequently spends time with friends and enjoys these relationships. He also participates in soccer activities three times per week and is part of a soccer league in the summer. During his free time, he likes to play video games and occasionally watches television.    Mr. Nino is not currently employed. 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 While Mr. Nino previously started one semester of college prior to his diagnosis of ALL, he has not yet returned. At this time, he is prioritizing moving to [place] in order to pursue work in the family business.     Mr. Nino reported that he is largely independent in his medical care. He manages his own medications and appointments. There are no complaints of pain or fatigue. Vision and hearing are reported to be intact. Mr. Nino sleeps about 8 hours a night;  there were no concerns were reported about his sleep habits or sleep quality. He also shared that he has a healthy appetite and eats a variety of foods. Mr. Nino rarely drinks alcohol. He reported that he uses marijuana weekly to aid with cancer treatment-related side effects; per his report, this does not cause interference for him and he denied other substance use.    NEUROPSYCHOLOGICAL ASSESSMENT   Behavioral observations  Mr. Nino was accompanied to the appointment by his father. They  immediately after being greeted by the examiners and Mr. Nino participated independently in all aspects of the appointment. He was casually dressed and appropriately groomed. He was alert and oriented to time, place, and person. Vision and hearing seemed adequate for testing purposes. Mr. Nino presented as a polite and quiet young man. While he did not initiate conversation, he demonstrated good back-and-forth social communication after questions were asked. Speech was clear, fluent, and appropriate in rate, rhythm, and volume. Eye-contact was consistent and directly appropriately. Affect was appropriate in range and intensity, mood appeared neutral (neither elevated nor depressed). No unusual motor mannerisms were observed. Mr. Nino appeared to have adequate language comprehension as task instructions were quickly understood. Thought processes and content were normal and organized. No distorted perceptions were observed. Insight and judgement appeared intact. Mr. Nino was offered breaks from testing but did not choose to take any. Engagement throughout the evaluation was strong. He was attentive throughout and thoughtful in his responding.     Of note, the current evaluation was conducted during the COVID-19 pandemic. As such, the necessary personal protective equipment (PPE) was required to be worn throughout the evaluation. The examiner(s) wore a face mask, shield, and gloves, and Mr. Nino wore a  face mask. Safety procedures including but not limited to the use of PPE may result in increased distraction, anxiety and a diminished capacity for the patient and the examiner(s) to read each other s nonverbal cues. Testing conditions with PPE are not consistent with the usual and customary process of evaluation. Nonetheless, Mr. Nino appeared to put forth his best effort, and the PPE worn did not appear to be of great interference or distraction for him. His behavior during the evaluation was consistent with prior reports and he put forth good effort throughout testing. As such, under these circumstances, the results of this evaluation are considered a valid reflection of Mr. Nino s current level of functioning in the areas assessed.    Neuropsychological Evaluation Methods and Instruments:  Review of Records  Clinical behavioral observations  Clinical Interview  Wechsler Adult Intelligence Scale, 4th Ed.  [place] Verbal Learning Test, Third Edition    Malou-Renae Executive Function System              Color-Word Interference              [place] Making Test  Purdue Pegboard   Magma HQy-BuLooma Developmental Test of Visual-Motor Integration, 6th Ed.   Drake-Osterrieth Complex Figure Drawing Test and Recognition   Behavior Rating Inventory of Executive Function - Adult Version  Richards Depression Inventory, 2nd Ed.  Richards Anxiety Inventory     SUMMARY AND IMPRESSIONS   Mr. 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 His treatment regimen included two bone marrow transplants, several rounds of intrathecal chemotherapy, CAR-T cell therapy, and total body irradiation (1320 cGy). As such treatment may confer risks to learning, attention, or other neurocognitive skills even after treatment has ended, Mr. Nino was referred by his transplant  team for neuropsychological evaluation to examine his current functioning now that he is one year status post-transplant.    Results from the current evaluation indicates that Mr. Nino s cognitive performance across domains was at or above expectation for young adults his age and consistent with expectations based on his prior performance during evaluations in 2019 and 2021. His overall intellectual functioning measured in the average range, with consistently average scores across verbal and non-verbal (visual-spatial) reasoning tasks. Working memory (the ability to take in information and mentally manipulate it in mind) and processing speed skills also measured in the average range for his age, consistent with prior testing.     Performance on measures of executive functioning (or the set of cognitive functioning skills that are necessary for cognitive control of thoughts, behaviors, and emotions) were consistently average across tasks. This included his ability to sequence information, briefly attend to auditory information and hold information on-line in working memory, inhibit impulsive responses and shift in response to changes in task demands with accuracy and speed. He demonstrated an organized and efficient approach to a visual reproduction task during which he was asked to copy a complex geometric figure, attaining a solidly average score. On self-report questionnaires of attention and executive functioning, Mr. Nino s ratings were entirely within normal limits for his age and not reflective of significant concern.     Mr. Nino s learning and memory for verbal and visual information measured as strong, ranging from average to above average at a level that was consistent with or somewhat better than expectations based on past performance. On a rote verbal list learning task, Mr. Nino showed strong retention after a single presentation of the list and showed strong additional learning over time  (high average). His recall after short and long delay intervals ranged from high average to slightly above average, and he showed good use of efficient strategy in recalling words from the list (i.e., spontaneously recalling words based on categories instead of relying on order of presentation). When presented with items from the list in a recognition (Yes/No) format he also attained an average score. Verbal learning and recall for narrative stories were also solidly average. Visual learning and memory (i.e., reproduction of a complex design after short and long delay periods) was also strong, with scores in the above average range.     Fine motor and visual-motor integration skills measured in the average range, consistent with past performance. While not reflected in his scores, Mr. Nino remarked that he found visuomotor tasks to be more difficult for him than the other tasks, and qualitatively his approach to reproducing drawings was somewhat slow.     In summary, Mr. Nino continues to do very well, displaying strong and stable performance across neurocognitive domains consistent with results of prior testing. While he previously endorsed mild difficulties readjusting to school, Mr. Nino did not endorse any concerns regarding adjustment, mood, or behavioral concerns on the current evaluation. Along with his cognitive and emotional resilience, he also functions independently with his activities of daily living and medical care. Recommendations are offered below to assist Mr. Nino as he plans the next chapter of his young adult life.     Diagnoses  C91.01 Acute lymphoblastic leukemia, in remission    Based on Mr. 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 If he decides to return to college or pursue other higher education, he should know that scholarship  opportunities are available for survivors of childhood cancers. Several options can be found here: [email or web address] or [email or web address]. He could also pursue career counseling or assessment if desired, [email or web address]       Lifestyle Behaviors    Health behaviors are an important means of reducing long-term chronic disease risk and improving health outcomes in childhood cancer survivors. Mr. Nino is encouraged to continue to regularly engage in positive recreational activities, including social (e.g., going out with friends) and physical recreation (e.g., soccer), as well as regularly seeking out cognitively stimulating activities (e.g., learning a new skill), in order to promote healthy aging and emotional well-being. If desired, a one-on-one nutrition education consult for survivors are available for free at: https://[email or web address].[email or web address]/managing-[email or web address]-cancer/food-[email or web address]-nutrition. Mr. Nino should also monitor his marijuana use to ensure this does not become problematic and discuss his use with his physicians. Despite the potential benefits of marijuana for symptom control (e.g., nausea, pain) during and after treatment, studies suggest that cannabis use is associated with reduced neurocognitive functioning. As childhood cancer survivors are also at risk for cardiac- and pulmonary-related late effects, method of cannabis administration (e.g., inhaled vs by mouth) is also important to consider when gauging marijuana s potentially harmful role to future health.    Additional Resources    To learn more about survivorship, Mr. Nino is encouraged to explore the following:  o Websites:  - The [facility] has developed long-term follow-up guidelines for survivors of childhood cancers. These guidelines can help you know what to watch for, what type of screening  should be done, and how late effects may be treated. Mr. Nino can learn more about these guidelines and late effects of cancer treatments at: www.survivorshipguidelines.org/   - As he plans his move to [place], Mr. Nino may also choose to explore the Leukemia and Lymphoma Society of [place] website for resources and support at: https://www.bloodcancers.ca/  o Books  - Childhood Cancer Survivors: A Practical Guide to Your Future by Maddison Rueda, Tonja Jennings, and Rocio Morrell  o Podcasts  - The podcast  The Bloodline with LLS  is also a great resource to listen and learn about ALL and survivorship. This can be heard at: https://www.lls.org/support-resources/lls-podcast    Follow-Up    We would like to see Mr. Nino again in one year for a follow-up evaluation in this clinic to continue to monitor his cognitive and behavioral progress in the context of his medical history.  We will certainly see him sooner if concerns arise.    It has been a pleasure working with Mr. Nino. If there are questions regarding this evaluation, please contact us at [phone].    Latoya Gutierrez M.A.  Pediatric Neuropsychology Intern  [facility]   [facility]     Deandra Shabazz, Ph.D., L.P.  Pediatric Neuropsychologist   [facility]          PEDIATRIC NEUROPSYCHOLOGY CLINIC  CONFIDENTIAL TEST SCORES    Note: These scores are intended for appropriately licensed professionals and should never be interpreted without consideration of the attached narrative report.    Test Results:   Note: The test data listed below use one or more of the following formats:   *Standard Scores have an average of 100 a standard deviation of 15 (the average range is 85 to 115).   *Scaled Scores have an average of 10 and a standard deviation of 3 (the average range is 7 to 13).   *T-Scores have an average range of 50 and a standard deviation of 10  (the average range is 40 to 60).     COGNITIVE FUNCTIONING  Wechsler Adult Intelligence Scale, Fourth Edition   Standard scores from 85 - 115 represent the average range of functioning.  Scaled scores from 7 - 13 represent the average range of functioning.     Index 2019 Standard Score 2021 Standard Score 2022 Standard Score   Verbal Comprehension 102 105 105   Perceptual Reasoning 107 107 105   Working Memory 92 102 97   Processing Speed 97 97 100   Full Scale  104 103      Subtest 2019 Standard Score 2021 Scaled Score 2022 Scaled Score   Similarities 10 10 11   Vocabulary 10 11 10   Information 11 12 12   Block Design 13 13 13   Visual Puzzles 8 10 8   Matrix Reasoning 13 11 12   Digit Span 10 11 10   Arithmetic 7 10 9   Coding 10 9 8   Symbol Search 9 10 12      ATTENTION AND EXECUTIVE FUNCTIONING     Test of Variables of Attention, Visual (2019 only)  Scores from 85 - 115 represent the average range of functioning.          Measure  Quarter 1  Quarter 2  Quarter 3  Quarter 4  Total    Omissions  102  102 105 107  108   Commissions  114  112 120 118  121   Response Time  97 93 115 114 110   Variability  80  106 123 115 103       Malou-Renae Executive Function System Color-Word Interference Test  Scaled Scores from 7 - 13 represent the average range of functioning.     Measure 2019 Scaled Score 2021 Scaled Score 2022 Scaled Score   Color Naming 11 11 10   Word Reading 7 9 9   Inhibition 12 11 12   Inhibition/Switching 8 10 10        Malou-Renae Executive Function System Trail Making Test  Scaled Scores from 7 - 13 represent the average range of functioning.     Measure 2019 Scaled Score 2021 Scaled Score 2022 Scaled Score   Visual Scanning 13 13 12   Number Sequencing 13 13 12   Letter Sequencing 13 14 12   Number-Letter Switching 10 12 11   Motor Speed 12 9 10      Behavior Rating Inventory of Executive Function, Adult Version - Informant/Self Report  T-scores 65 and higher are considered to be in the   clinically significant  range.     Index/Scale 2019 Parent T-Score 2021 Parent T-Score 2022 Self-Report   Inhibit 46 42 40   Shift 51 38 43   Emotional Control 43 39 38   Self-Monitor 59 37 50   Behavioral Regulation Index (ARON) 48 37 40   Initiate 47 39 43   Working Memory 39 39 39   Plan/Organize 44 38 38   Task-Monitor 36 38 40   Organization of Materials 45 37 36   Metacognitive Index (MCI) 41 36 38   Global Executive Composite (GEC) 44 36 38      FINE-MOTOR AND VISUAL-MOTOR FUNCTIONING     Purdue Pegboard  Standard scores from 85 - 115 represent the average range of functioning.     Trial 2021 Pegs Placed 2021 Standard Score 2022 Pegs Placed 2022 Standard Score   Dominant (R) 15 96 15 96   Non-Dominant 15 99 15 99   Both Hands 13 pairs 100 13 pairs 100       Neno-Cecile Developmental Test of Visual Motor Integration, Sixth Edition  Standard scores from 85 - 115 represent the average range of functioning.     2022 Raw Score 2022 Standard Score   29 103      MEMORY/ORIENTATION FUNCTIONING  [place] Verbal Learning Test, Third Edition   Index Scores from 85  - 115 represent the average range of functioning.   Scaled Scores from 7 - 13 represent the average range of functioning.    Measure 2019 Scaled Score 2022 Scaled Score   List A Trial 1 Free Recall 8 13   List A Trial 5 Free Recall 9 12   List B Free Recall - 10   List A Short-Delay Free Recall 9 14   List A Short-Delay Cued Recall 9 13   List A Long-Delay Free Recall 10 12   List A Long-Delay Cued Recall 8 12   Correct Recognition Hits 7 9   False Positives* 9 9   Discriminability 9 9   Total Intrusions* 12 12   Forced Choice - 16       Wechsler Memory Scale, Fourth Edition  Scaled scores from 7 - 13 represent the average range of functioning. Percentiles 16-84 represent the average range of functioning.     Subtest 2021 Scaled Score 2021 Cumulative Percentile   Logical Memory I 9     Logical Memory II 10     Logical Memory Recognition - 26-50   Visual  Reproduction I 14     Visual Reproduction II 10     Visual Reproduction Recognition  - 17-25      Drake Complex Figure Test and Recognition Trial   T-scores from 40 - 60 define the average range of functioning. Percentiles 16-84 represent the average range of functioning.     Task 2021 Raw 2021 T-Score 2021 Percentile  2022 Raw 2022 T-Score 2022 Percentile   Copy 33 - 11-16 36 - >16   Immediate Recall 20 39  - 32 66 -   Delayed Recall 17.5 32  - 32 67 -   Recognition 22 52  - 10 - >16          EMOTIONAL AND BEHAVIORAL FUNCTIONING  For the Clinical Scales on the BASC-3, scores ranging from 60-69 are considered to be in the  at-risk  range and scores of 70 or higher are considered  clinically significant.   For the Adaptive Scales, scores between 30 and 39 are considered to be in the  at-risk  range and scores of 29 or lower are considered  clinically significant.       Richards Depression Inventory, Second Edition  Raw scores from 0-13 are considered to be  minimal depression , scores from 14-19 are considered to be  mild depression , scores from 20-28 are considered to be  moderate depression , and scores from 29-63 are considered to be  severe depression.      Raw Score   0      Richards Anxiety Inventory  Raw scores from 0-7 are considered to be  minimal anxiety , scores from 8-15 are considered to be  mild anxiety , scores from 16-25 are considered to be  moderate anxiety , and scores from 26-63 are considered to be  severe anxiety.      Raw Score   1     Behavioral Assessment System for Children, Third Edition, Self-Report of Personality - [place]     Clinical Scales 2021 T-Score   Adaptive Scales 2021 T-score   Atypicality 45   Relations with Parents 55   Locus of Control 48   Interpersonal Relations 43   Social Stress 53   Self-Esteem 61   Anxiety 36   Self-Reliance 57   Depression 57         Sense of Inadequacy  45   Composite Indices     Somatization 50   Internalizing Problems 47   Attention Problems 49    Inattention/Hyperactivity 47   Hyperactivity 46   Emotional Symptoms Index 44   Sensation Seeking 53   Personal Adjustment 55   Alcohol Abuse 43         Social Maladjustment   65*         *At-risk       CC      Copy to patient  RUSSEL MORALES RICHARD  [address] [facility] [address]    ***      Deandra Shabazz, PhD LP

## 2022-01-28 DIAGNOSIS — Z11.59 ENCOUNTER FOR SCREENING FOR OTHER VIRAL DISEASES: Primary | ICD-10-CM

## 2022-01-31 DIAGNOSIS — H61.23 BILATERAL IMPACTED CERUMEN: Primary | ICD-10-CM

## 2022-02-01 ENCOUNTER — PRE VISIT (OUTPATIENT)
Dept: OPHTHALMOLOGY | Facility: CLINIC | Age: 22
End: 2022-02-01

## 2022-02-04 RX ORDER — ONDANSETRON 8 MG/1
TABLET, FILM COATED ORAL
COMMUNITY
Start: 2020-12-15 | End: 2022-02-08

## 2022-02-04 RX ORDER — ITRACONAZOLE 100 MG/1
CAPSULE ORAL
COMMUNITY
End: 2022-02-08

## 2022-02-04 RX ORDER — TADALAFIL 5 MG/1
TABLET ORAL
COMMUNITY

## 2022-02-04 RX ORDER — OXYCODONE HYDROCHLORIDE 5 MG/1
TABLET ORAL
COMMUNITY
Start: 2020-11-17 | End: 2022-02-08

## 2022-02-04 RX ORDER — VALACYCLOVIR HYDROCHLORIDE 1 G/1
TABLET, FILM COATED ORAL
COMMUNITY
Start: 2019-01-10 | End: 2022-02-08

## 2022-02-04 RX ORDER — ITRACONAZOLE 10 MG/ML
SOLUTION ORAL
COMMUNITY
End: 2022-02-08

## 2022-02-07 ENCOUNTER — PRE VISIT (OUTPATIENT)
Dept: ENDOCRINOLOGY | Facility: CLINIC | Age: 22
End: 2022-02-07

## 2022-02-07 ENCOUNTER — OFFICE VISIT (OUTPATIENT)
Dept: OTOLARYNGOLOGY | Facility: CLINIC | Age: 22
End: 2022-02-07
Attending: OTOLARYNGOLOGY
Payer: COMMERCIAL

## 2022-02-07 ENCOUNTER — OFFICE VISIT (OUTPATIENT)
Dept: AUDIOLOGY | Facility: CLINIC | Age: 22
End: 2022-02-07
Attending: OTOLARYNGOLOGY
Payer: COMMERCIAL

## 2022-02-07 ENCOUNTER — HOSPITAL ENCOUNTER (OUTPATIENT)
Dept: CARDIOLOGY | Facility: CLINIC | Age: 22
Discharge: HOME | End: 2022-02-07
Attending: PEDIATRICS | Admitting: PEDIATRICS
Payer: COMMERCIAL

## 2022-02-07 ENCOUNTER — HOSPITAL ENCOUNTER (OUTPATIENT)
Dept: GENERAL RADIOLOGY | Facility: CLINIC | Age: 22
End: 2022-02-07
Attending: PEDIATRICS
Payer: COMMERCIAL

## 2022-02-07 ENCOUNTER — ANCILLARY PROCEDURE (OUTPATIENT)
Dept: BONE DENSITY | Facility: CLINIC | Age: 22
End: 2022-02-07
Attending: PEDIATRICS
Payer: COMMERCIAL

## 2022-02-07 ENCOUNTER — VIRTUAL VISIT (OUTPATIENT)
Dept: ENDOCRINOLOGY | Facility: CLINIC | Age: 22
End: 2022-02-07
Attending: PEDIATRICS
Payer: COMMERCIAL

## 2022-02-07 VITALS — TEMPERATURE: 97.7 F | BODY MASS INDEX: 20.96 KG/M2 | WEIGHT: 141.5 LBS | HEIGHT: 69 IN

## 2022-02-07 DIAGNOSIS — C91.02 ACUTE LYMPHOBLASTIC LEUKEMIA (ALL) IN RELAPSE (H): ICD-10-CM

## 2022-02-07 DIAGNOSIS — Z94.81 S/P ALLOGENEIC BONE MARROW TRANSPLANT (H): ICD-10-CM

## 2022-02-07 DIAGNOSIS — Z76.82 BONE MARROW TRANSPLANT CANDIDATE: ICD-10-CM

## 2022-02-07 DIAGNOSIS — N52.9 ERECTILE DYSFUNCTION, UNSPECIFIED ERECTILE DYSFUNCTION TYPE: ICD-10-CM

## 2022-02-07 DIAGNOSIS — H69.93 DYSFUNCTION OF BOTH EUSTACHIAN TUBES: Primary | ICD-10-CM

## 2022-02-07 DIAGNOSIS — C91.02 ACUTE LYMPHOBLASTIC LEUKEMIA (ALL) IN RELAPSE (H): Primary | ICD-10-CM

## 2022-02-07 DIAGNOSIS — H61.23 BILATERAL IMPACTED CERUMEN: ICD-10-CM

## 2022-02-07 PROCEDURE — 77072 BONE AGE STUDIES: CPT

## 2022-02-07 PROCEDURE — 92557 COMPREHENSIVE HEARING TEST: CPT | Performed by: AUDIOLOGIST

## 2022-02-07 PROCEDURE — 77072 BONE AGE STUDIES: CPT | Mod: 26 | Performed by: RADIOLOGY

## 2022-02-07 PROCEDURE — 77080 DXA BONE DENSITY AXIAL: CPT

## 2022-02-07 PROCEDURE — 92567 TYMPANOMETRY: CPT | Performed by: AUDIOLOGIST

## 2022-02-07 PROCEDURE — 93306 TTE W/DOPPLER COMPLETE: CPT

## 2022-02-07 PROCEDURE — 93306 TTE W/DOPPLER COMPLETE: CPT | Mod: 26 | Performed by: PEDIATRICS

## 2022-02-07 PROCEDURE — 99213 OFFICE O/P EST LOW 20 MIN: CPT | Performed by: OTOLARYNGOLOGY

## 2022-02-07 PROCEDURE — 77080 DXA BONE DENSITY AXIAL: CPT | Mod: 26 | Performed by: RADIOLOGY

## 2022-02-07 PROCEDURE — G0463 HOSPITAL OUTPT CLINIC VISIT: HCPCS

## 2022-02-07 PROCEDURE — 99207 PR NO CHARGE LOS: CPT | Performed by: INTERNAL MEDICINE

## 2022-02-07 ASSESSMENT — PAIN SCALES - GENERAL: PAINLEVEL: NO PAIN (0)

## 2022-02-07 ASSESSMENT — MIFFLIN-ST. JEOR: SCORE: 1628.71

## 2022-02-07 NOTE — PROGRESS NOTES
Artemio is a 21 year old who is being evaluated via a billable video visit.      How would you like to obtain your AVS? [employer]  If the video visit is dropped, the invitation should be resent by: Text to cell phone: [phone]  Will anyone else be joining your video visit? No

## 2022-02-07 NOTE — PATIENT INSTRUCTIONS
1.  You were seen in the ENT Clinic today by Dr. Rangel. If you have any questions or concerns after your appointment, please call [phone].    2.  Plan is to follow-up as needed.    Thank you!  Evie Saavedra

## 2022-02-07 NOTE — LETTER
2/7/2022       RE: Artemio Nino  [address] [place] [address]     Dear Colleague,    Thank you for referring your patient, Artemio Nino, to the [facility] ENDOCRINOLOGY [facility]INIC [place] [facility]. Please see a copy of my visit note below.      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 Solomon for e/m ?Erectile Dysfunction?    He saw Urology and got that all figured out & he is not sure why he is seeing me today & does not have any endocrine Qs.    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)      History of blood transfusion      WPW (Aaron-Parkinson-White syndrome) 03/2018     Past Surgical History:   Procedure Laterality Date     BONE MARROW BIOPSY, BONE SPECIMEN, NEEDLE/TROCAR Right 11/27/2018    Procedure: bone marrow biopsy;  Surgeon: Jacqueline Fitzgerald NP;  [facility]      BONE MARROW BIOPSY, BONE SPECIMEN, NEEDLE/TROCAR N/A 2/8/2019    Procedure: BIOPSY BONE MARROW;  Surgeon: Lisa Workman NP;  [facility]      BONE MARROW BIOPSY, BONE SPECIMEN, NEEDLE/TROCAR N/A 5/16/2019    Procedure: BIOPSY, BONE MARROW;  Surgeon: Lilia López APRN CNP;  [facility]     BONE MARROW BIOPSY, BONE SPECIMEN, NEEDLE/TROCAR N/A 11/7/2019    Procedure: Bone marrow biopsy;  Surgeon: Jacqueline Shin NP;  [facility]      BONE MARROW BIOPSY, BONE SPECIMEN, NEEDLE/TROCAR Right 6/10/2020    Procedure: BIOPSY, BONE MARROW;  Surgeon: Candido Han PA-C;  [facility]      BONE MARROW BIOPSY, BONE SPECIMEN, NEEDLE/TROCAR N/A 8/24/2020    Procedure: Bone  marrow biopsy;  Surgeon: Jacqueline Shin NP;  [facility]      BONE MARROW BIOPSY, BONE SPECIMEN, NEEDLE/TROCAR N/A 9/25/2020    Procedure: BIOPSY, BONE MARROW;  Surgeon: Lilia López APRN CNP;  [facility]      BONE MARROW BIOPSY, BONE SPECIMEN, NEEDLE/TROCAR N/A 3/11/2021    Procedure: BIOPSY BONE MARROW;  Surgeon: Lisa Workman NP;  [facility]      BONE MARROW BIOPSY, BONE SPECIMEN, NEEDLE/TROCAR N/A 5/21/2021    Procedure: BIOPSY, BONE MARROW;  Surgeon: Jacqueline Shin NP;  [facility]      BONE MARROW BIOPSY, BONE SPECIMEN, NEEDLE/TROCAR N/A 8/11/2021    Procedure: BIOPSY BONE MARROW;  Surgeon: Lisa Workman NP;  [facility]      ESOPHAGOSCOPY, GASTROSCOPY, DUODENOSCOPY (EGD), COMBINED N/A 2/22/2019    Procedure: Upper endoscopy with biopsy;  Surgeon: Magdalene Hobson MD;  [facility]      INSERT CATHETER VASCULAR ACCESS N/A 7/20/2020    Procedure: NON -APHERESIS Double lumen tunneled central burns line placement;  Surgeon: Pato Gómez PA-C;  [facility]      INSERT CATHETER VASCULAR ACCESS N/A 2/8/2021    Procedure: apheresis catheter placement;  Surgeon: Pato Gómez PA-C;  [facility]      INSERT CATHETER VASCULAR ACCESS APHERESIS CHILD N/A 6/10/2020    Procedure: Large Bore Double Lumen NON TUNNELED Apheresis Catheter placement;  Surgeon: Link Rios PA-C;  [facility]      INSERT CATHETER VASCULAR ACCESS DOUBLE LUMEN CHILD N/A 10/26/2018    Procedure: double lumen tunneled line placement;  Surgeon: Rex Valente MD;  [facility]      INSERT PORT VASCULAR ACCESS N/A 6/10/2020    Procedure: Port placement;  Surgeon: Link Rios PA-C;  [facility]      IR CHEST PORT PLACEMENT > 5 YRS OF AGE  6/10/2020     IR CVC NON TUNNEL LINE REMOVAL  6/12/2020     IR CVC NON TUNNEL PLACEMENT   6/10/2020     IR CVC TUNNEL PLACEMENT > 5 YRS OF AGE  7/20/2020     IR CVC TUNNEL PLACEMENT > 5 YRS OF AGE  2/8/2021     IR CVC TUNNEL REMOVAL LEFT  2/8/2019     IR CVC TUNNEL REMOVAL LEFT  8/24/2020     IR CVC TUNNEL REMOVAL LEFT  4/9/2021     IR PORT REMOVAL LEFT  5/16/2019     O CLAVICLE LEFT Left     fracture with surgical repair and plate/screws     ORTHOPEDIC SURGERY      femur     REMOVE CATHETER VASCULAR ACCESS N/A 2/8/2019    Procedure: Tunneled line removal;  Surgeon: Krystal Esparza MD;  [facility]      REMOVE CATHETER VASCULAR ACCESS N/A 8/24/2020    Procedure: tunneled line removal;  Surgeon: Siri Hernandez PA-C;  [facility]      REMOVE CATHETER VASCULAR ACCESS Left 4/9/2021    Procedure: REMOVAL, VASCULAR ACCESS CATHETER;  Surgeon: Link Rios PA-C;  [facility]      REMOVE PORT VASCULAR ACCESS N/A 5/16/2019    Procedure: REMOVAL, VASCULAR ACCESS PORT;  Surgeon: Link Rios PA-C;  [facility]       Allergies:  Allergies   Allergen Reactions     Blood Transfusion Related (Informational Only) Other (See Comments)     Patient has a history of a clinically significant antibody against RBC antigens.  A delay in compatible RBCs may occur.Stem cell transplant patient.  Give type O RBCs.  Requires Benadryl and tylenol as premed for platelets and RBCs for hx of hives     Voriconazole Photosensitivity     Significant rash - do not rechallenge       Current Medications MEDICATIONS IN THIS CHART MAY NOT BE ACCURATE.    Current Outpatient Medications   Medication     Cholecalciferol (VITAMIN D3) 50 MCG (2000 UT) CAPS     dronabinol (MARINOL) 2.5 MG capsule     fluconazole (DIFLUCAN) 200 MG tablet     itraconazole (SPORANOX) 10 MG/ML solution     itraconazole (SPORANOX) 100 MG capsule     LORazepam (ATIVAN) 1 MG tablet     magnesium oxide (MAG-OX) 400 (241.3 Mg) MG tablet     omeprazole (PRILOSEC) 20 MG DR capsule     ondansetron  (ZOFRAN) 8 MG tablet     oxyCODONE (ROXICODONE) 5 MG tablet     pantoprazole (PROTONIX) 40 MG EC tablet     ruxolitinib (JAKAFI) 20 MG TABS tablet     sulfamethoxazole-trimethoprim (BACTRIM) 400-80 MG tablet     tacrolimus (GENERIC EQUIVALENT) 1 MG capsule     tacrolimus (GENERIC EQUIVALENT) 1 mg/mL suspension     tadalafil (CIALIS) 5 MG tablet     valACYclovir (VALTREX) 1000 mg tablet     No current facility-administered medications for this visit.       Family History:  No family history on file.    Social History:  Social History     Tobacco Use     Smoking status: Never Smoker     Smokeless tobacco: Never Used   Substance Use Topics     Alcohol use: Never       ROS:  see HPI    Exam  There were no vitals taken for this virtual visit.  Gen: calm, nad, pleasant and conversant  Neuro: A&O    Labs/Imaging       Ref. Range 11/7/2019 09:18   T4 Free Latest Ref Range: 0.76 - 1.46 ng/dL 0.88      Ref. 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 If you develop an endocrine issue and you want to see us in the future, please call [phone] or do it via [employer].    Best Wishes!  Dr. Jenn Queen MD, MPH  Endocrinologist      Total Time: 27 min spent on chart review, evaluation, management on the day of encounter.  0 levi Ulloa is a 21 year old who is being evaluated via a billable video visit.      How would you like to obtain your AVS? [email or web address]  If the video visit is dropped, the invitation should be resent by: Text to cell phone: [phone]  Will anyone else be joining your video visit? No

## 2022-02-07 NOTE — PROGRESS NOTES
Pediatric Otolaryngology and Facial Plastic Surgery    CC:   Chief Complaints and History of Present Illnesses   Patient presents with     Ear Problem     Pt here for ear cleaning.       Referring Provider: Nickolas:  Date of Service: 02/07/22        Dear Dr. Olmos,    I had the pleasure of meeting Artemio Nino in consultation today at your request in the [facility].    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  No history of ear disease, ROM, or childhood hearing loss. He has not had any other history of cerumen impactions.  Otherwise doing quite well.      PMH:  Born term, No NICU stay, passed New Born Hearing Screen, Immunizations up to date.   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)      History of blood transfusion      WPW (Aaron-Parkinson-White syndrome) 03/2018        PSH:  Past Surgical History:   Procedure Laterality Date     BONE MARROW BIOPSY, BONE SPECIMEN, NEEDLE/TROCAR Right 11/27/2018    Procedure: bone marrow biopsy;  Surgeon: Jacqueline Fitzgerald NP;  [facility]      BONE MARROW BIOPSY, BONE SPECIMEN, NEEDLE/TROCAR N/A 2/8/2019    Procedure: BIOPSY BONE MARROW;  Surgeon: Lisa Workman NP;  [facility]      BONE MARROW BIOPSY, BONE SPECIMEN, NEEDLE/TROCAR N/A 5/16/2019    Procedure: BIOPSY, BONE MARROW;  Surgeon: Lilia López APRN CNP;  [facility]     BONE MARROW BIOPSY, BONE SPECIMEN, NEEDLE/TROCAR N/A 11/7/2019    Procedure: Bone marrow biopsy;  Surgeon: Jacqueline Shin NP;  [facility]      BONE MARROW BIOPSY, BONE SPECIMEN, NEEDLE/TROCAR Right 6/10/2020    Procedure: BIOPSY, BONE MARROW;  Surgeon: Candido Han PA-C;  [facility]      BONE MARROW BIOPSY, BONE SPECIMEN, NEEDLE/TROCAR N/A 8/24/2020    Procedure: Bone  marrow biopsy;  Surgeon: Jacqueline Shin NP;  [facility]      BONE MARROW BIOPSY, BONE SPECIMEN, NEEDLE/TROCAR N/A 9/25/2020    Procedure: BIOPSY, BONE MARROW;  Surgeon: Lilia López APRN CNP;  [facility]      BONE MARROW BIOPSY, BONE SPECIMEN, NEEDLE/TROCAR N/A 3/11/2021    Procedure: BIOPSY BONE MARROW;  Surgeon: Lisa Workman NP;  [facility]      BONE MARROW BIOPSY, BONE SPECIMEN, NEEDLE/TROCAR N/A 5/21/2021    Procedure: BIOPSY, BONE MARROW;  Surgeon: Jacqueline Shin NP;  [facility]      BONE MARROW BIOPSY, BONE SPECIMEN, NEEDLE/TROCAR N/A 8/11/2021    Procedure: BIOPSY BONE MARROW;  Surgeon: Lisa Workman NP;  [facility]      ESOPHAGOSCOPY, GASTROSCOPY, DUODENOSCOPY (EGD), COMBINED N/A 2/22/2019    Procedure: Upper endoscopy with biopsy;  Surgeon: Magdalene Hobson MD;  [facility]      INSERT CATHETER VASCULAR ACCESS N/A 7/20/2020    Procedure: NON -APHERESIS Double lumen tunneled central burns line placement;  Surgeon: Pato Gómez PA-C;  [facility]      INSERT CATHETER VASCULAR ACCESS N/A 2/8/2021    Procedure: apheresis catheter placement;  Surgeon: Pato Gómez PA-C;  [facility]      INSERT CATHETER VASCULAR ACCESS APHERESIS CHILD N/A 6/10/2020    Procedure: Large Bore Double Lumen NON TUNNELED Apheresis Catheter placement;  Surgeon: Link Rios PA-C;  [facility]      INSERT CATHETER VASCULAR ACCESS DOUBLE LUMEN CHILD N/A 10/26/2018    Procedure: double lumen tunneled line placement;  Surgeon: Rex Valente MD;  [facility]      INSERT PORT VASCULAR ACCESS N/A 6/10/2020    Procedure: Port placement;  Surgeon: Link Rios PA-C;  [facility]      IR CHEST PORT PLACEMENT > 5 YRS OF AGE  6/10/2020     IR CVC NON TUNNEL LINE REMOVAL  6/12/2020     IR CVC NON TUNNEL PLACEMENT   6/10/2020     IR CVC TUNNEL PLACEMENT > 5 YRS OF AGE  7/20/2020     IR CVC TUNNEL PLACEMENT > 5 YRS OF AGE  2/8/2021     IR CVC TUNNEL REMOVAL LEFT  2/8/2019     IR CVC TUNNEL REMOVAL LEFT  8/24/2020     IR CVC TUNNEL REMOVAL LEFT  4/9/2021     IR PORT REMOVAL LEFT  5/16/2019     O CLAVICLE LEFT Left     fracture with surgical repair and plate/screws     ORTHOPEDIC SURGERY      femur     REMOVE CATHETER VASCULAR ACCESS N/A 2/8/2019    Procedure: Tunneled line removal;  Surgeon: Krystal Esparza MD;  [facility]      REMOVE CATHETER VASCULAR ACCESS N/A 8/24/2020    Procedure: tunneled line removal;  Surgeon: Siri Hernandez PA-C;  [facility]      REMOVE CATHETER VASCULAR ACCESS Left 4/9/2021    Procedure: REMOVAL, VASCULAR ACCESS CATHETER;  Surgeon: Link Rios PA-C;  [facility]      REMOVE PORT VASCULAR ACCESS N/A 5/16/2019    Procedure: REMOVAL, VASCULAR ACCESS PORT;  Surgeon: Link Rios PA-C;  [facility]       Medications:    Current Outpatient Medications   Medication Sig Dispense Refill     Cholecalciferol (VITAMIN D3) 50 MCG (2000 UT) CAPS take 1 capsule (2,000 Units) by mouth every day x90 Days       fluconazole (DIFLUCAN) 200 MG tablet Take 400 mg by mouth       itraconazole (SPORANOX) 10 MG/ML solution itraconazole 10 mg/mL oral solution       itraconazole (SPORANOX) 100 MG capsule itraconazole 100 mg capsule       omeprazole (PRILOSEC) 20 MG DR capsule omeprazole 20 mg capsule,delayed release       ondansetron (ZOFRAN) 8 MG tablet ondansetron HCl 8 mg tablet       oxyCODONE (ROXICODONE) 5 MG tablet oxycodone 5 mg tablet       sulfamethoxazole-trimethoprim (BACTRIM) 400-80 MG tablet Take 2 tablets by mouth Every Mon, Tues two times daily 32 tablet 1     tadalafil (CIALIS) 5 MG tablet tadalafil 5 mg tablet   TAKE ONE TABLET BY MOUTH EVERY DAY       valACYclovir (VALTREX) 1000 mg tablet valacyclovir 1 gram tablet        dronabinol (MARINOL) 2.5 MG capsule Take 1 capsule (2.5 mg) by mouth 2 times daily (before meals) (Patient not taking: Reported on 8/18/2021) 60 capsule 1     LORazepam (ATIVAN) 1 MG tablet Take 1 tablet (1 mg) by mouth 2 times daily as needed for anxiety (Patient not taking: Reported on 8/18/2021) 60 tablet 0     magnesium oxide (MAG-OX) 400 (241.3 Mg) MG tablet Take 1 tablet (400 mg) by mouth daily (Patient not taking: Reported on 8/18/2021) 60 tablet 3     pantoprazole (PROTONIX) 40 MG EC tablet Take 1 tablet (40 mg) by mouth daily (Patient not taking: Reported on 8/18/2021) 60 tablet 2     ruxolitinib (JAKAFI) 20 MG TABS tablet Take 1 tablet (20 mg) by mouth 2 times daily 60 tablet 1     tacrolimus (GENERIC EQUIVALENT) 1 MG capsule See Tacrolimus Suspension Rx for current dosing (Patient not taking: Reported on 8/18/2021) 60 capsule 3     tacrolimus (GENERIC EQUIVALENT) 1 mg/mL suspension Follow taper schedule (Patient not taking: Reported on 8/18/2021) 60 mL 3       Allergies:   Allergies   Allergen Reactions     Blood Transfusion Related (Informational Only) Other (See Comments)     Patient has a history of a clinically significant antibody against RBC antigens.  A delay in compatible RBCs may occur.Stem cell transplant patient.  Give type O RBCs.  Requires Benadryl and tylenol as premed for platelets and RBCs for hx of hives     Voriconazole Photosensitivity     Significant rash - do not rechallenge       Social History:  No smoke exposure  lives with parents   Social History     Socioeconomic History     Marital status: Single     Spouse name: Not on file     Number of children: Not on file     Years of education: Not on file     Highest education level: Not on file   Occupational History     Not on file   Tobacco Use     Smoking status: Never Smoker     Smokeless tobacco: Never Used   Substance and Sexual Activity     Alcohol use: Never     Drug use: Never     Sexual activity: Not on file   Other Topics  "Concern     Parent/sibling w/ CABG, MI or angioplasty before 65F 55M? Not Asked   Social History Narrative     Not on file     Social Determinants of Health     Financial Resource Strain: Not on file   Food Insecurity: Not on file   Transportation Needs: Not on file   Physical Activity: Not on file   Stress: Not on file   Social Connections: Not on file   Intimate Partner Violence: Not on file   Housing Stability: Not on file       REVIEW OF SYSTEMS:  12 point ROS obtained and was negative other than the symptoms noted above in the HPI.    PHYSICAL EXAMINATION:  Temp 97.7  F (36.5  C) (Temporal)   Ht 5' 9.09\" (175.5 cm)   Wt 141 lb 8 oz (64.2 kg)   BMI 20.84 kg/m    GENERAL: NAD. Sitting comfortably in exam chair.    HEAD: normocephalic, atraumatic. Alopecia    EYES: EOMs intact. Sclera white    EARS: EACs of normal caliber with significant cerumen.  Bilateral TMs are intact with no evidence of middle ear effusions.    NOSE: nasal septum is midline and stable. No drainage noted.    MOUTH: MMM. Lips are intact. No lesions noted. Tongue midline.    NECK: Supple, trachea midline. No significant lymphadenopathy noted.     RESP: Symmetric chest expansion. No respiratory distress.    Imaging reviewed: None    Laboratory reviewed: None    Audiogram today demonstrates normal hearing thresholds bilaterally with speech detection threshold of 5 dB on the right and 0 on the left.    Impressions and Recommendations:  Artemio is a 21 year old male with a history of ALL now s/p BMT.  At this point he is doing quite well.  I recommend continued yearly audiograms.  If there are any changes in his audiogram may be happy to see him back in ENT clinic otherwise we will follow-up with him as needed.    Thank you for allowing me to participate in the care of Artemio. Please don't hesitate to contact me.    Jacob Rangel MD  Pediatric Otolaryngology and Facial Plastic Surgery  Department of Otolaryngology  [facility]" [facility] [phone]   Pager [phone]   [email or web address]

## 2022-02-07 NOTE — PROGRESS NOTES
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 Solomon for e/m ?Erectile Dysfunction?    He saw Urology and got that all figured out & he is not sure why he is seeing me today & does not have any endocrine Qs.    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)      History of blood transfusion      WPW (Aaron-Parkinson-White syndrome) 03/2018     Past Surgical History:   Procedure Laterality Date     BONE MARROW BIOPSY, BONE SPECIMEN, NEEDLE/TROCAR Right 11/27/2018    Procedure: bone marrow biopsy;  Surgeon: Jacqueline Fitzgerald NP;  [facility]      BONE MARROW BIOPSY, BONE SPECIMEN, NEEDLE/TROCAR N/A 2/8/2019    Procedure: BIOPSY BONE MARROW;  Surgeon: Lisa Workman NP;  [facility]      BONE MARROW BIOPSY, BONE SPECIMEN, NEEDLE/TROCAR N/A 5/16/2019    Procedure: BIOPSY, BONE MARROW;  Surgeon: Lilia López APRN CNP;  [facility]     BONE MARROW BIOPSY, BONE SPECIMEN, NEEDLE/TROCAR N/A 11/7/2019    Procedure: Bone marrow biopsy;  Surgeon: Jacqueline Shin NP;  [facility]      BONE MARROW BIOPSY, BONE SPECIMEN, NEEDLE/TROCAR Right 6/10/2020    Procedure: BIOPSY, BONE MARROW;  Surgeon: Candido Han PA-C;  [facility]      BONE MARROW BIOPSY, BONE SPECIMEN, NEEDLE/TROCAR N/A 8/24/2020    Procedure: Bone marrow biopsy;  Surgeon: Jacqueline Shin NP;  [facility]      BONE MARROW BIOPSY, BONE SPECIMEN, NEEDLE/TROCAR N/A 9/25/2020    Procedure: BIOPSY, BONE MARROW;  Surgeon: Lilia López APRN CNP;  [facility]      BONE MARROW BIOPSY, BONE SPECIMEN, NEEDLE/TROCAR N/A 3/11/2021    Procedure: BIOPSY  BONE MARROW;  Surgeon: Lisa Workman NP;  [facility]      BONE MARROW BIOPSY, BONE SPECIMEN, NEEDLE/TROCAR N/A 5/21/2021    Procedure: BIOPSY, BONE MARROW;  Surgeon: Jacqueline Shin NP;  [facility]      BONE MARROW BIOPSY, BONE SPECIMEN, NEEDLE/TROCAR N/A 8/11/2021    Procedure: BIOPSY BONE MARROW;  Surgeon: Lisa Workman NP;  [facility]      ESOPHAGOSCOPY, GASTROSCOPY, DUODENOSCOPY (EGD), COMBINED N/A 2/22/2019    Procedure: Upper endoscopy with biopsy;  Surgeon: Magdalene Hobson MD;  [facility]      INSERT CATHETER VASCULAR ACCESS N/A 7/20/2020    Procedure: NON -APHERESIS Double lumen tunneled central burns line placement;  Surgeon: Pato Gómez PA-C;  [facility]      INSERT CATHETER VASCULAR ACCESS N/A 2/8/2021    Procedure: apheresis catheter placement;  Surgeon: Pato Gómez PA-C;  [facility]      INSERT CATHETER VASCULAR ACCESS APHERESIS CHILD N/A 6/10/2020    Procedure: Large Bore Double Lumen NON TUNNELED Apheresis Catheter placement;  Surgeon: Link Rios PA-C;  [facility]      INSERT CATHETER VASCULAR ACCESS DOUBLE LUMEN CHILD N/A 10/26/2018    Procedure: double lumen tunneled line placement;  Surgeon: Rex Valente MD;  [facility]      INSERT PORT VASCULAR ACCESS N/A 6/10/2020    Procedure: Port placement;  Surgeon: Link Rios PA-C;  [facility]      IR CHEST PORT PLACEMENT > 5 YRS OF AGE  6/10/2020     IR CVC NON TUNNEL LINE REMOVAL  6/12/2020     IR CVC NON TUNNEL PLACEMENT  6/10/2020     IR CVC TUNNEL PLACEMENT > 5 YRS OF AGE  7/20/2020     IR CVC TUNNEL PLACEMENT > 5 YRS OF AGE  2/8/2021     IR CVC TUNNEL REMOVAL LEFT  2/8/2019     IR CVC TUNNEL REMOVAL LEFT  8/24/2020     IR CVC TUNNEL REMOVAL LEFT  4/9/2021     IR PORT REMOVAL LEFT  5/16/2019     O CLAVICLE LEFT Left     fracture with surgical  repair and plate/screws     ORTHOPEDIC SURGERY      femur     REMOVE CATHETER VASCULAR ACCESS N/A 2/8/2019    Procedure: Tunneled line removal;  Surgeon: Krystal Esparza MD;  [facility]      REMOVE CATHETER VASCULAR ACCESS N/A 8/24/2020    Procedure: tunneled line removal;  Surgeon: Siri Hernandez PA-C;  [facility]      REMOVE CATHETER VASCULAR ACCESS Left 4/9/2021    Procedure: REMOVAL, VASCULAR ACCESS CATHETER;  Surgeon: Link Rios PA-C;  [facility]      REMOVE PORT VASCULAR ACCESS N/A 5/16/2019    Procedure: REMOVAL, VASCULAR ACCESS PORT;  Surgeon: Link Rios PA-C;  [facility]       Allergies:  Allergies   Allergen Reactions     Blood Transfusion Related (Informational Only) Other (See Comments)     Patient has a history of a clinically significant antibody against RBC antigens.  A delay in compatible RBCs may occur.Stem cell transplant patient.  Give type O RBCs.  Requires Benadryl and tylenol as premed for platelets and RBCs for hx of hives     Voriconazole Photosensitivity     Significant rash - do not rechallenge       Current Medications MEDICATIONS IN THIS CHART MAY NOT BE ACCURATE.    Current Outpatient Medications   Medication     Cholecalciferol (VITAMIN D3) 50 MCG (2000 UT) CAPS     dronabinol (MARINOL) 2.5 MG capsule     fluconazole (DIFLUCAN) 200 MG tablet     itraconazole (SPORANOX) 10 MG/ML solution     itraconazole (SPORANOX) 100 MG capsule     LORazepam (ATIVAN) 1 MG tablet     magnesium oxide (MAG-OX) 400 (241.3 Mg) MG tablet     omeprazole (PRILOSEC) 20 MG DR capsule     ondansetron (ZOFRAN) 8 MG tablet     oxyCODONE (ROXICODONE) 5 MG tablet     pantoprazole (PROTONIX) 40 MG EC tablet     ruxolitinib (JAKAFI) 20 MG TABS tablet     sulfamethoxazole-trimethoprim (BACTRIM) 400-80 MG tablet     tacrolimus (GENERIC EQUIVALENT) 1 MG capsule     tacrolimus (GENERIC EQUIVALENT) 1 mg/mL suspension     tadalafil  (CIALIS) 5 MG tablet     valACYclovir (VALTREX) 1000 mg tablet     No current facility-administered medications for this visit.       Family History:  No family history on file.    Social History:  Social History     Tobacco Use     Smoking status: Never Smoker     Smokeless tobacco: Never Used   Substance Use Topics     Alcohol use: Never       ROS:  see HPI    Exam  There were no vitals taken for this virtual visit.  Gen: calm, nad, pleasant and conversant  Neuro: A&O    Labs/Imaging       Ref. Range 11/7/2019 09:18   T4 Free Latest Ref Range: 0.76 - 1.46 ng/dL 0.88      Ref. 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 If you develop an endocrine issue and you want to see us in the future, please call [phone] or do it via [employer].    Best Wishes!  Dr. Jenn Queen MD, MPH  Endocrinologist      Total Time: 27 min spent on chart review, evaluation, management on the day of encounter.  0 billed

## 2022-02-07 NOTE — NURSING NOTE
"Chief Complaint   Patient presents with     Ent Problem     Patient here for follow up       Temp 97.7  F (36.5  C) (Temporal)   Ht 5' 9.09\" (175.5 cm)   Wt 141 lb 8 oz (64.2 kg)   BMI 20.84 kg/m      Travis Gordon  "

## 2022-02-07 NOTE — PATIENT INSTRUCTIONS
Nice to talk with you today in virtual clinic. If you develop an endocrine issue and you want to see us in the future, please call [phone] or do it via [employer].    Best Wishes!  Dr. Jenn Queen MD, MPH  Endocrinologist

## 2022-02-07 NOTE — PROGRESS NOTES
AUDIOLOGY REPORT     SUMMARY: Audiology visit completed. See audiogram for results. Abuse screening not completed due to same day appt with ENT clinic, where this is addressed.        RECOMMENDATIONS: Follow-up with ENT.    Bianca Clements, [facility]  Licensed Audiologist  [place] #[id number]

## 2022-02-07 NOTE — LETTER
2/7/2022      RE: Artemio Nino  [address] [address]       Pediatric Otolaryngology and Facial Plastic Surgery    CC:   Chief Complaints and History of Present Illnesses   Patient presents with     Ear Problem     Pt here for ear cleaning.       Referring Provider: Nickolas:  Date of Service: 02/07/22        Dear Dr. Olmos,    I had the pleasure of meeting Artemio Nino in consultation today at your request in the [facility].    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  No history of ear disease, ROM, or childhood hearing loss. He has not had any other history of cerumen impactions.  Otherwise doing quite well.      PMH:  Born term, No NICU stay, passed New Born Hearing Screen, Immunizations up to date.   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)      History of blood transfusion      WPW (Aaron-Parkinson-White syndrome) 03/2018        PSH:  Past Surgical History:   Procedure Laterality Date     BONE MARROW BIOPSY, BONE SPECIMEN, NEEDLE/TROCAR Right 11/27/2018    Procedure: bone marrow biopsy;  Surgeon: Jacqueline Fitzgerald NP;  [facility]      BONE MARROW BIOPSY, BONE SPECIMEN, NEEDLE/TROCAR N/A 2/8/2019    Procedure: BIOPSY BONE MARROW;  Surgeon: Lisa Workman NP;  [facility]      BONE MARROW BIOPSY, BONE SPECIMEN, NEEDLE/TROCAR N/A 5/16/2019    Procedure: BIOPSY, BONE MARROW;  Surgeon: Lilia López APRN CNP;  [facility]     BONE MARROW BIOPSY, BONE SPECIMEN, NEEDLE/TROCAR N/A 11/7/2019    Procedure: Bone marrow biopsy;  Surgeon: Jacqueline Shin NP;  [facility]      BONE MARROW BIOPSY, BONE SPECIMEN, NEEDLE/TROCAR Right 6/10/2020    Procedure: BIOPSY, BONE MARROW;  Surgeon: Candido Han PA-C;  [facility]       BONE MARROW BIOPSY, BONE SPECIMEN, NEEDLE/TROCAR N/A 8/24/2020    Procedure: Bone marrow biopsy;  Surgeon: Jacqueline Shin NP;  [facility]      BONE MARROW BIOPSY, BONE SPECIMEN, NEEDLE/TROCAR N/A 9/25/2020    Procedure: BIOPSY, BONE MARROW;  Surgeon: Lilia López APRN CNP;  [facility]      BONE MARROW BIOPSY, BONE SPECIMEN, NEEDLE/TROCAR N/A 3/11/2021    Procedure: BIOPSY BONE MARROW;  Surgeon: Lisa Workman NP;  [facility]      BONE MARROW BIOPSY, BONE SPECIMEN, NEEDLE/TROCAR N/A 5/21/2021    Procedure: BIOPSY, BONE MARROW;  Surgeon: Jacqueline Shin NP;  [facility]      BONE MARROW BIOPSY, BONE SPECIMEN, NEEDLE/TROCAR N/A 8/11/2021    Procedure: BIOPSY BONE MARROW;  Surgeon: Lisa Workman NP;  [facility]      ESOPHAGOSCOPY, GASTROSCOPY, DUODENOSCOPY (EGD), COMBINED N/A 2/22/2019    Procedure: Upper endoscopy with biopsy;  Surgeon: Magdalene Hobson MD;  [facility]      INSERT CATHETER VASCULAR ACCESS N/A 7/20/2020    Procedure: NON -APHERESIS Double lumen tunneled central burns line placement;  Surgeon: Pato Gómez PA-C;  [facility]      INSERT CATHETER VASCULAR ACCESS N/A 2/8/2021    Procedure: apheresis catheter placement;  Surgeon: Pato Gómez PA-C;  [facility]      INSERT CATHETER VASCULAR ACCESS APHERESIS CHILD N/A 6/10/2020    Procedure: Large Bore Double Lumen NON TUNNELED Apheresis Catheter placement;  Surgeon: Link Rios PA-C;  [facility]      INSERT CATHETER VASCULAR ACCESS DOUBLE LUMEN CHILD N/A 10/26/2018    Procedure: double lumen tunneled line placement;  Surgeon: Rex Valente MD;  [facility]      INSERT PORT VASCULAR ACCESS N/A 6/10/2020    Procedure: Port placement;  Surgeon: Link Rios PA-C;  [facility]      IR CHEST PORT PLACEMENT > 5 YRS OF AGE  6/10/2020      IR CVC NON TUNNEL LINE REMOVAL  6/12/2020     IR CVC NON TUNNEL PLACEMENT  6/10/2020     IR CVC TUNNEL PLACEMENT > 5 YRS OF AGE  7/20/2020     IR CVC TUNNEL PLACEMENT > 5 YRS OF AGE  2/8/2021     IR CVC TUNNEL REMOVAL LEFT  2/8/2019     IR CVC TUNNEL REMOVAL LEFT  8/24/2020     IR CVC TUNNEL REMOVAL LEFT  4/9/2021     IR PORT REMOVAL LEFT  5/16/2019     O CLAVICLE LEFT Left     fracture with surgical repair and plate/screws     ORTHOPEDIC SURGERY      femur     REMOVE CATHETER VASCULAR ACCESS N/A 2/8/2019    Procedure: Tunneled line removal;  Surgeon: Krystal Esparza MD;  [facility]      REMOVE CATHETER VASCULAR ACCESS N/A 8/24/2020    Procedure: tunneled line removal;  Surgeon: Siri Hernandez PA-C;  [facility]      REMOVE CATHETER VASCULAR ACCESS Left 4/9/2021    Procedure: REMOVAL, VASCULAR ACCESS CATHETER;  Surgeon: Link Rios PA-C;  [facility]      REMOVE PORT VASCULAR ACCESS N/A 5/16/2019    Procedure: REMOVAL, VASCULAR ACCESS PORT;  Surgeon: Link Rios PA-C;  [facility]       Medications:    Current Outpatient Medications   Medication Sig Dispense Refill     Cholecalciferol (VITAMIN D3) 50 MCG (2000 UT) CAPS take 1 capsule (2,000 Units) by mouth every day x90 Days       fluconazole (DIFLUCAN) 200 MG tablet Take 400 mg by mouth       itraconazole (SPORANOX) 10 MG/ML solution itraconazole 10 mg/mL oral solution       itraconazole (SPORANOX) 100 MG capsule itraconazole 100 mg capsule       omeprazole (PRILOSEC) 20 MG DR capsule omeprazole 20 mg capsule,delayed release       ondansetron (ZOFRAN) 8 MG tablet ondansetron HCl 8 mg tablet       oxyCODONE (ROXICODONE) 5 MG tablet oxycodone 5 mg tablet       sulfamethoxazole-trimethoprim (BACTRIM) 400-80 MG tablet Take 2 tablets by mouth Every Mon, Tues two times daily 32 tablet 1     tadalafil (CIALIS) 5 MG tablet tadalafil 5 mg tablet   TAKE ONE TABLET BY MOUTH EVERY DAY        valACYclovir (VALTREX) 1000 mg tablet valacyclovir 1 gram tablet       dronabinol (MARINOL) 2.5 MG capsule Take 1 capsule (2.5 mg) by mouth 2 times daily (before meals) (Patient not taking: Reported on 8/18/2021) 60 capsule 1     LORazepam (ATIVAN) 1 MG tablet Take 1 tablet (1 mg) by mouth 2 times daily as needed for anxiety (Patient not taking: Reported on 8/18/2021) 60 tablet 0     magnesium oxide (MAG-OX) 400 (241.3 Mg) MG tablet Take 1 tablet (400 mg) by mouth daily (Patient not taking: Reported on 8/18/2021) 60 tablet 3     pantoprazole (PROTONIX) 40 MG EC tablet Take 1 tablet (40 mg) by mouth daily (Patient not taking: Reported on 8/18/2021) 60 tablet 2     ruxolitinib (JAKAFI) 20 MG TABS tablet Take 1 tablet (20 mg) by mouth 2 times daily 60 tablet 1     tacrolimus (GENERIC EQUIVALENT) 1 MG capsule See Tacrolimus Suspension Rx for current dosing (Patient not taking: Reported on 8/18/2021) 60 capsule 3     tacrolimus (GENERIC EQUIVALENT) 1 mg/mL suspension Follow taper schedule (Patient not taking: Reported on 8/18/2021) 60 mL 3       Allergies:   Allergies   Allergen Reactions     Blood Transfusion Related (Informational Only) Other (See Comments)     Patient has a history of a clinically significant antibody against RBC antigens.  A delay in compatible RBCs may occur.Stem cell transplant patient.  Give type O RBCs.  Requires Benadryl and tylenol as premed for platelets and RBCs for hx of hives     Voriconazole Photosensitivity     Significant rash - do not rechallenge       Social History:  No smoke exposure  lives with parents   Social History     Socioeconomic History     Marital status: Single     Spouse name: Not on file     Number of children: Not on file     Years of education: Not on file     Highest education level: Not on file   Occupational History     Not on file   Tobacco Use     Smoking status: Never Smoker     Smokeless tobacco: Never Used   Substance and Sexual Activity     Alcohol use: Never  "    Drug use: Never     Sexual activity: Not on file   Other Topics Concern     Parent/sibling w/ CABG, MI or angioplasty before 65F 55M? Not Asked   Social History Narrative     Not on file     Social Determinants of Health     Financial Resource Strain: Not on file   Food Insecurity: Not on file   Transportation Needs: Not on file   Physical Activity: Not on file   Stress: Not on file   Social Connections: Not on file   Intimate Partner Violence: Not on file   Housing Stability: Not on file       REVIEW OF SYSTEMS:  12 point ROS obtained and was negative other than the symptoms noted above in the HPI.    PHYSICAL EXAMINATION:  Temp 97.7  F (36.5  C) (Temporal)   Ht 5' 9.09\" (175.5 cm)   Wt 141 lb 8 oz (64.2 kg)   BMI 20.84 kg/m    GENERAL: NAD. Sitting comfortably in exam chair.    HEAD: normocephalic, atraumatic. Alopecia    EYES: EOMs intact. Sclera white    EARS: EACs of normal caliber with significant cerumen.  Bilateral TMs are intact with no evidence of middle ear effusions.    NOSE: nasal septum is midline and stable. No drainage noted.    MOUTH: MMM. Lips are intact. No lesions noted. Tongue midline.    NECK: Supple, trachea midline. No significant lymphadenopathy noted.     RESP: Symmetric chest expansion. No respiratory distress.    Imaging reviewed: None    Laboratory reviewed: None    Audiogram today demonstrates normal hearing thresholds bilaterally with speech detection threshold of 5 dB on the right and 0 on the left.    Impressions and Recommendations:  Artemio is a 21 year old male with a history of ALL now s/p BMT.  At this point he is doing quite well.  I recommend continued yearly audiograms.  If there are any changes in his audiogram may be happy to see him back in ENT clinic otherwise we will follow-up with him as needed.    Thank you for allowing me to participate in the care of Artemio. Please don't hesitate to contact me.    Jacob Rangel MD  Pediatric Otolaryngology and Facial " Plastic Surgery  Department of Otolaryngology  [facility] [phone]   Pager [phone]   [email or web address]

## 2022-02-08 ENCOUNTER — ALLIED HEALTH/NURSE VISIT (OUTPATIENT)
Dept: TRANSPLANT | Facility: CLINIC | Age: 22
End: 2022-02-08
Attending: PEDIATRICS
Payer: COMMERCIAL

## 2022-02-08 ENCOUNTER — PRE VISIT (OUTPATIENT)
Dept: OPHTHALMOLOGY | Facility: CLINIC | Age: 22
End: 2022-02-08

## 2022-02-08 ENCOUNTER — OFFICE VISIT (OUTPATIENT)
Dept: DERMATOLOGY | Facility: CLINIC | Age: 22
End: 2022-02-08
Attending: DERMATOLOGY
Payer: COMMERCIAL

## 2022-02-08 ENCOUNTER — ONCOLOGY VISIT (OUTPATIENT)
Dept: TRANSPLANT | Facility: CLINIC | Age: 22
End: 2022-02-08
Attending: PHYSICIAN ASSISTANT
Payer: COMMERCIAL

## 2022-02-08 ENCOUNTER — OFFICE VISIT (OUTPATIENT)
Dept: OPHTHALMOLOGY | Facility: CLINIC | Age: 22
End: 2022-02-08
Payer: COMMERCIAL

## 2022-02-08 VITALS
HEIGHT: 69 IN | RESPIRATION RATE: 18 BRPM | OXYGEN SATURATION: 98 % | SYSTOLIC BLOOD PRESSURE: 112 MMHG | TEMPERATURE: 98.7 F | DIASTOLIC BLOOD PRESSURE: 73 MMHG | HEART RATE: 83 BPM | WEIGHT: 140.87 LBS | BODY MASS INDEX: 20.87 KG/M2

## 2022-02-08 VITALS — HEIGHT: 69 IN | WEIGHT: 141 LBS | BODY MASS INDEX: 20.88 KG/M2

## 2022-02-08 VITALS — HEIGHT: 69 IN | BODY MASS INDEX: 20.96 KG/M2 | WEIGHT: 141.54 LBS

## 2022-02-08 DIAGNOSIS — B07.8 COMMON WART: Primary | ICD-10-CM

## 2022-02-08 DIAGNOSIS — C91.02 ACUTE LYMPHOBLASTIC LEUKEMIA (ALL) IN RELAPSE (H): Primary | ICD-10-CM

## 2022-02-08 DIAGNOSIS — H52.12 MYOPIA OF LEFT EYE: ICD-10-CM

## 2022-02-08 DIAGNOSIS — Z11.59 ENCOUNTER FOR SCREENING FOR OTHER VIRAL DISEASES: ICD-10-CM

## 2022-02-08 DIAGNOSIS — D22.9 MULTIPLE BENIGN NEVI: ICD-10-CM

## 2022-02-08 DIAGNOSIS — C91.00 ALL (ACUTE LYMPHOCYTIC LEUKEMIA) (H): ICD-10-CM

## 2022-02-08 DIAGNOSIS — Z94.81 S/P ALLOGENEIC BONE MARROW TRANSPLANT (H): Primary | ICD-10-CM

## 2022-02-08 DIAGNOSIS — C91.00 ALL (ACUTE LYMPHOBLASTIC LEUKEMIA OF INFANT) (H): ICD-10-CM

## 2022-02-08 DIAGNOSIS — H04.123 DRY EYES, BILATERAL: Primary | ICD-10-CM

## 2022-02-08 DIAGNOSIS — C91.01 ACUTE LYMPHOBLASTIC LEUKEMIA (ALL) IN REMISSION (H): ICD-10-CM

## 2022-02-08 DIAGNOSIS — Z76.82 BONE MARROW TRANSPLANT CANDIDATE: Primary | ICD-10-CM

## 2022-02-08 DIAGNOSIS — Z94.81 S/P ALLOGENEIC BONE MARROW TRANSPLANT (H): ICD-10-CM

## 2022-02-08 LAB
ATRIAL RATE - MUSE: 84 BPM
DIASTOLIC BLOOD PRESSURE - MUSE: NORMAL MMHG
INTERPRETATION ECG - MUSE: NORMAL
P AXIS - MUSE: 66 DEGREES
PR INTERVAL - MUSE: 110 MS
QRS DURATION - MUSE: 114 MS
QT - MUSE: 376 MS
QTC - MUSE: 444 MS
R AXIS - MUSE: 85 DEGREES
SARS-COV-2 RNA RESP QL NAA+PROBE: NEGATIVE
SYSTOLIC BLOOD PRESSURE - MUSE: NORMAL MMHG
T AXIS - MUSE: 60 DEGREES
VENTRICULAR RATE- MUSE: 84 BPM

## 2022-02-08 PROCEDURE — 92014 COMPRE OPH EXAM EST PT 1/>: CPT | Performed by: OPHTHALMOLOGY

## 2022-02-08 PROCEDURE — 94729 DIFFUSING CAPACITY: CPT

## 2022-02-08 PROCEDURE — U0003 INFECTIOUS AGENT DETECTION BY NUCLEIC ACID (DNA OR RNA); SEVERE ACUTE RESPIRATORY SYNDROME CORONAVIRUS 2 (SARS-COV-2) (CORONAVIRUS DISEASE [COVID-19]), AMPLIFIED PROBE TECHNIQUE, MAKING USE OF HIGH THROUGHPUT TECHNOLOGIES AS DESCRIBED BY CMS-2020-01-R: HCPCS

## 2022-02-08 PROCEDURE — G0463 HOSPITAL OUTPT CLINIC VISIT: HCPCS | Mod: 25

## 2022-02-08 PROCEDURE — 94375 RESPIRATORY FLOW VOLUME LOOP: CPT | Mod: 26 | Performed by: PEDIATRICS

## 2022-02-08 PROCEDURE — 94729 DIFFUSING CAPACITY: CPT | Mod: 26 | Performed by: PEDIATRICS

## 2022-02-08 PROCEDURE — 93005 ELECTROCARDIOGRAM TRACING: CPT

## 2022-02-08 PROCEDURE — G0463 HOSPITAL OUTPT CLINIC VISIT: HCPCS | Mod: 25,27

## 2022-02-08 PROCEDURE — 99214 OFFICE O/P EST MOD 30 MIN: CPT | Mod: GC | Performed by: DERMATOLOGY

## 2022-02-08 PROCEDURE — 99215 OFFICE O/P EST HI 40 MIN: CPT | Performed by: PHYSICIAN ASSISTANT

## 2022-02-08 PROCEDURE — G0463 HOSPITAL OUTPT CLINIC VISIT: HCPCS

## 2022-02-08 PROCEDURE — 94375 RESPIRATORY FLOW VOLUME LOOP: CPT

## 2022-02-08 PROCEDURE — 94726 PLETHYSMOGRAPHY LUNG VOLUMES: CPT | Mod: 26 | Performed by: PEDIATRICS

## 2022-02-08 PROCEDURE — 94726 PLETHYSMOGRAPHY LUNG VOLUMES: CPT

## 2022-02-08 PROCEDURE — 94150 VITAL CAPACITY TEST: CPT

## 2022-02-08 PROCEDURE — 99417 PROLNG OP E/M EACH 15 MIN: CPT | Performed by: PHYSICIAN ASSISTANT

## 2022-02-08 RX ORDER — VITAMIN B COMPLEX
TABLET ORAL DAILY
COMMUNITY

## 2022-02-08 ASSESSMENT — TONOMETRY
IOP_METHOD: TONOPEN
OS_IOP_MMHG: 19
OD_IOP_MMHG: 18

## 2022-02-08 ASSESSMENT — VISUAL ACUITY
METHOD: SNELLEN - LINEAR
OS_SC: 20/20
OS_SC: J1+
OD_SC+: -1
OD_SC: 20/15
OD_SC: J1+
OS_SC+: -1

## 2022-02-08 ASSESSMENT — EXTERNAL EXAM - RIGHT EYE: OD_EXAM: NORMAL

## 2022-02-08 ASSESSMENT — EXTERNAL EXAM - LEFT EYE: OS_EXAM: NORMAL

## 2022-02-08 ASSESSMENT — REFRACTION_MANIFEST
OD_CYLINDER: SPHERE
OS_SPHERE: -0.25
OD_SPHERE: PLANO
OS_CYLINDER: SPHERE

## 2022-02-08 ASSESSMENT — CONF VISUAL FIELD
METHOD: COUNTING FINGERS
OS_NORMAL: 1
OD_NORMAL: 1

## 2022-02-08 ASSESSMENT — PAIN SCALES - GENERAL
PAINLEVEL: NO PAIN (0)
PAINLEVEL: NO PAIN (0)

## 2022-02-08 ASSESSMENT — SLIT LAMP EXAM - LIDS
COMMENTS: NORMAL
COMMENTS: NORMAL

## 2022-02-08 ASSESSMENT — CUP TO DISC RATIO
OS_RATIO: 0.25
OD_RATIO: 0.3

## 2022-02-08 ASSESSMENT — MIFFLIN-ST. JEOR
SCORE: 1628.87
SCORE: 1624.95
SCORE: 1625.25

## 2022-02-08 NOTE — PROGRESS NOTES
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 He presents to clinic today for a History and Physical prior to his 1 year anniversary BMBx/LP tomorrow.    Isael has generally been doing very well since his last visit with us 6 months ago. He has not been seen by his oncology team at [facility] in several months. He has had no hospitalizations, ED visits, or traumas. He received his annual influenza vaccine, and has received 2 doses of the Pfizer COVID vaccine with plans to receive a booster in the near future. He did develop COVID-19 approximately 4 weeks ago, experiencing mild URI symptoms that are now essentially resolved. He has a normal appetite and his energy and sleep patterns have been normal to his baseline. He saw a Urologist in December due to concerns of Erectile Dysfunction and was prescribed tadalafil, which he reports today that he is not taking. He does not see cardiology regularly for his WPW, and he has not yet seen a dentist since transplant.    Today he reports that he is feeling well without recent fever, cough, congestion, rhinorrhea, odynophagia, GI dysregulation, headache, vision/hearing changes, rashes, joint stiffness or pain, or difficulties with mobility or energy. He does endorse dry skin, which is baseline for him.    ROS: A complete review of systems is negative except as noted in HPI    Past Medical History  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)      History of blood transfusion      WPW (Aaron-Parkinson-White syndrome) 03/2018       Past Surgical History  Past Surgical History:   Procedure Laterality Date     BONE MARROW BIOPSY, BONE SPECIMEN, NEEDLE/TROCAR Right 11/27/2018    Procedure: bone marrow biopsy;   Surgeon: Jacqueline Fitzgerald NP;  [facility]      BONE MARROW BIOPSY, BONE SPECIMEN, NEEDLE/TROCAR N/A 2/8/2019    Procedure: BIOPSY BONE MARROW;  Surgeon: Lisa Workman NP;  [facility]      BONE MARROW BIOPSY, BONE SPECIMEN, NEEDLE/TROCAR N/A 5/16/2019    Procedure: BIOPSY, BONE MARROW;  Surgeon: Lilia López, SNOW CNP;  [facility]     BONE MARROW BIOPSY, BONE SPECIMEN, NEEDLE/TROCAR N/A 11/7/2019    Procedure: Bone marrow biopsy;  Surgeon: Jacqueline Shin NP;  [facility]      BONE MARROW BIOPSY, BONE SPECIMEN, NEEDLE/TROCAR Right 6/10/2020    Procedure: BIOPSY, BONE MARROW;  Surgeon: Candido Han PA-C;  [facility]      BONE MARROW BIOPSY, BONE SPECIMEN, NEEDLE/TROCAR N/A 8/24/2020    Procedure: Bone marrow biopsy;  Surgeon: Jacqueline Shin NP;  [facility]      BONE MARROW BIOPSY, BONE SPECIMEN, NEEDLE/TROCAR N/A 9/25/2020    Procedure: BIOPSY, BONE MARROW;  Surgeon: Lilia López, SNOW CNP;  [facility]      BONE MARROW BIOPSY, BONE SPECIMEN, NEEDLE/TROCAR N/A 3/11/2021    Procedure: BIOPSY BONE MARROW;  Surgeon: Lisa Workman NP;  [facility]      BONE MARROW BIOPSY, BONE SPECIMEN, NEEDLE/TROCAR N/A 5/21/2021    Procedure: BIOPSY, BONE MARROW;  Surgeon: Jacqueline Shin NP;  [facility]      BONE MARROW BIOPSY, BONE SPECIMEN, NEEDLE/TROCAR N/A 8/11/2021    Procedure: BIOPSY BONE MARROW;  Surgeon: Lisa Workman NP;  [facility]      ESOPHAGOSCOPY, GASTROSCOPY, DUODENOSCOPY (EGD), COMBINED N/A 2/22/2019    Procedure: Upper endoscopy with biopsy;  Surgeon: Magdalene Hobson MD;  [facility]      INSERT CATHETER VASCULAR ACCESS N/A 7/20/2020    Procedure: NON -APHERESIS Double lumen tunneled central burns line placement;  Surgeon: Pato Gómez PA-C;  [facility]      INSERT CATHETER VASCULAR ACCESS N/A 2/8/2021     Procedure: apheresis catheter placement;  Surgeon: Pato Gómez PA-C;  [facility]      INSERT CATHETER VASCULAR ACCESS APHERESIS CHILD N/A 6/10/2020    Procedure: Large Bore Double Lumen NON TUNNELED Apheresis Catheter placement;  Surgeon: Link Rios PA-C;  [facility]      INSERT CATHETER VASCULAR ACCESS DOUBLE LUMEN CHILD N/A 10/26/2018    Procedure: double lumen tunneled line placement;  Surgeon: Rex Valente MD;  [facility]      INSERT PORT VASCULAR ACCESS N/A 6/10/2020    Procedure: Port placement;  Surgeon: Link Rios PA-C;  [facility]      IR CHEST PORT PLACEMENT > 5 YRS OF AGE  6/10/2020     IR CVC NON TUNNEL LINE REMOVAL  6/12/2020     IR CVC NON TUNNEL PLACEMENT  6/10/2020     IR CVC TUNNEL PLACEMENT > 5 YRS OF AGE  7/20/2020     IR CVC TUNNEL PLACEMENT > 5 YRS OF AGE  2/8/2021     IR CVC TUNNEL REMOVAL LEFT  2/8/2019     IR CVC TUNNEL REMOVAL LEFT  8/24/2020     IR CVC TUNNEL REMOVAL LEFT  4/9/2021     IR PORT REMOVAL LEFT  5/16/2019     O CLAVICLE LEFT Left     fracture with surgical repair and plate/screws     ORTHOPEDIC SURGERY      femur     REMOVE CATHETER VASCULAR ACCESS N/A 2/8/2019    Procedure: Tunneled line removal;  Surgeon: Krytsal Esparza MD;  [facility]      REMOVE CATHETER VASCULAR ACCESS N/A 8/24/2020    Procedure: tunneled line removal;  Surgeon: Siri Hernandez PA-C;  [facility]      REMOVE CATHETER VASCULAR ACCESS Left 4/9/2021    Procedure: REMOVAL, VASCULAR ACCESS CATHETER;  Surgeon: Link Rios PA-C;  [facility]      REMOVE PORT VASCULAR ACCESS N/A 5/16/2019    Procedure: REMOVAL, VASCULAR ACCESS PORT;  Surgeon: Link Rios PA-C;  [facility]       Family History  Non-contributory-- no recent changes    Social History  Playing soccer in a rec league. Currently not working nor attending school.      Medications  Cholecalciferol (VITAMIN D3) 50 MCG (2000 UT) CAPS, take 1 capsule (2,000 Units) by mouth every day x90 Days  dronabinol (MARINOL) 2.5 MG capsule, Take 1 capsule (2.5 mg) by mouth 2 times daily (before meals) (Patient not taking: Reported on 8/18/2021)  fluconazole (DIFLUCAN) 200 MG tablet, Take 400 mg by mouth (Patient not taking: Reported on 2/7/2022)  itraconazole (SPORANOX) 10 MG/ML solution, itraconazole 10 mg/mL oral solution (Patient not taking: Reported on 2/7/2022)  itraconazole (SPORANOX) 100 MG capsule, itraconazole 100 mg capsule (Patient not taking: Reported on 2/7/2022)  LORazepam (ATIVAN) 1 MG tablet, Take 1 tablet (1 mg) by mouth 2 times daily as needed for anxiety (Patient not taking: Reported on 8/18/2021)  magnesium oxide (MAG-OX) 400 (241.3 Mg) MG tablet, Take 1 tablet (400 mg) by mouth daily (Patient not taking: Reported on 8/18/2021)  omeprazole (PRILOSEC) 20 MG DR capsule, omeprazole 20 mg capsule,delayed release (Patient not taking: Reported on 2/7/2022)  ondansetron (ZOFRAN) 8 MG tablet, ondansetron HCl 8 mg tablet (Patient not taking: Reported on 2/7/2022)  oxyCODONE (ROXICODONE) 5 MG tablet, oxycodone 5 mg tablet (Patient not taking: Reported on 2/7/2022)  pantoprazole (PROTONIX) 40 MG EC tablet, Take 1 tablet (40 mg) by mouth daily (Patient not taking: Reported on 8/18/2021)  ruxolitinib (JAKAFI) 20 MG TABS tablet, Take 1 tablet (20 mg) by mouth 2 times daily  sulfamethoxazole-trimethoprim (BACTRIM) 400-80 MG tablet, Take 2 tablets by mouth Every Mon, Tues two times daily  tacrolimus (GENERIC EQUIVALENT) 1 MG capsule, See Tacrolimus Suspension Rx for current dosing (Patient not taking: Reported on 8/18/2021)  tacrolimus (GENERIC EQUIVALENT) 1 mg/mL suspension, Follow taper schedule (Patient not taking: Reported on 8/18/2021)  tadalafil (CIALIS) 5 MG tablet, tadalafil 5 mg tablet   TAKE ONE TABLET BY MOUTH EVERY DAY (Patient not taking: Reported on 2/7/2022)  valACYclovir  (VALTREX) 1000 mg tablet, valacyclovir 1 gram tablet (Patient not taking: Reported on 2/7/2022)    No current facility-administered medications on file prior to visit.      Allergies   Allergies   Allergen Reactions     Blood Transfusion Related (Informational Only) Other (See Comments)     Patient has a history of a clinically significant antibody against RBC antigens.  A delay in compatible RBCs may occur.Stem cell transplant patient.  Give type O RBCs.  Requires Benadryl and tylenol as premed for platelets and RBCs for hx of hives     Voriconazole Photosensitivity     Significant rash - do not rechallenge       Physical Exam   Vital Signs for Peds 2/8/2022   SYSTOLIC 112   DIASTOLIC 73   PULSE 83   TEMPERATURE 98.7   RESPIRATIONS 18   WEIGHT (kg) 63.9 kg   HEIGHT (cm) 175.4 cm   BMI 20.77   pain    O2 98     GEN: awake, alert, appears stated age, well-groomed and casually dressed. NAD. Father present in person, step-mother present by phone.  HEENT: NC/AT, full head of hair, PERRL, EOMI, nares patent, dentition in good repair, oropharynx unremarkable and without tonsillar erythema or exudate, no cervical LAD, neck supple, trachea midline  CARD: RRR, no m/r/g, cap refill <2 sec, radial pulses 2+ bilaterally  RESP: CTAB, no adventitious sounds, normal S1/S2  ABD: soft, flat, NT/ND, +BS  EXTREM: WWP, MAEE  SKIN: globally dry, no rashes or lesions on exposed skin  ACCESS: none    Labs  Results for orders placed or performed during the hospital encounter of 02/07/22 (from the past 24 hour(s))   XR Hand Bone Age    Narrative    XR HAND BONE AGE     HISTORY: S/P allogeneic bone marrow transplant (H); Acute  lymphoblastic leukemia (ALL) in relapse (H)    COMPARISON: 11/16/2019    FINDINGS:   The patient's chronologic age is 21 years, 7 months.  Skeletally mature left hand.   No standard deviation is available for a male at this age.      Impression    IMPRESSION: Complete skeletal maturation of the left hand.     GWENDOLYN  MD LUCHO         SYSTEM ID:  [id number]       Assessment and Plan   20 yo male with a history of multiply-relapsed pre-B cell ALL now 1 year post-second BMT ([facility]). Clinically well today, no concerns for undergoing sedated procedures 2/9.    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 Of note, initial post-CAR-T therapy was complicated by Grade III CRS necessitating Tocilizumab x3, dopamine pressor support, and steroids. No CRS, neurotoxicity, or infectious complications for [facility] CAR-T. He received preparative regimen for 2nd BMT per MT2013-09 with Thiotepa, Fludarabine, Busulfan, and ATG and received a UCBT on 2/16/21. Engrafted day +20. Day +21 peripheral VNTRs and marrow chimerism 100% with no evidence of disease. Day 100 and 180 studies 100% donor, also with no evidence of disease.  - 1 year BMBx/LP tomorrow 2/9-- per Dr. Solomon, will send Clonoseq MRD testing  - continue Ruxolitinib 20 mg BID, will likely discontinue once marrow results available  - will defer 1 year vaccines until off of ruxolitinib for 6 months  - if marrow results normal, Isael is interested in undergoing port removal in the near future  - continue 1 year anniversary evaluations/consults, will have summary visit with Dr. Solomon 2/16; will obtain 1 year anniversary labwork 2/9 in sedation unit    #  Risk for GVHD: no current concerns    FEN/Renal:  # Risk for malnutrition: eating regular diet, no current concern    Cardiovascular:  # WPW: asymptomatic, diagnosed 2018  - EKG today with WPW, will obtain Echo during 1 year anniversary evaluations    Heme:   # Pancytopenia secondary to chemotherapy: resolved, transfusion independent    Infectious Disease:  # Risk for infection given  immunocompromised status  Active: None  Prophylaxis:        --PJP prophylaxis: Bactrim-- will likely discontinue at 2/16 visit  Past infections:   - COVID19 January 2022    :  # Erectile Dysfunction: Cialis PRN per Urology    SEBAS Menendez, PA-C  Pediatric Blood and Marrow Transplant & Cellular Therapy Program  [facility]  Pager: [phone]  Fax: [phone]    I spent a total of 75 minutes with Artemio Nino on the date of encounter doing chart review, history and exam, review of labs/imaging, discussion with the family, documentation and further activities as noted above.     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

## 2022-02-08 NOTE — PATIENT INSTRUCTIONS
[facility]- Pediatric Dermatology  Dr. Laura Schaffer, Dr. Abdirizak Delacruz, Dr. Jazmín Wang, Dr. Omayra Dominguez, HANNAH Gallo Dr., Dr. Wanda Bowden & Dr. Alvaro Leon       Non Urgent  Nurse Triage Line; [phone]- Nica and Harriet VILLAGRAN Care Coordinators      [place] (/Complex ) [phone]      If you need a prescription refill, please contact your pharmacy. Refills are approved or denied by our Physicians during normal business hours, Monday through Fridays    Per office policy, refills will not be granted if you have not been seen within the past year (or sooner depending on your child's condition)      Scheduling Information:     Pediatric Appointment Scheduling and Call Center [phone]   Radiology Scheduling- [phone]     Sedation Unit Scheduling- [phone]    [place] Scheduling- [facility] [phone]; Pediatric Dermatology Clinic [phone]    Main  Services: [phone]   Yoruba: [phone]   Mexican: [phone]   Hmong/Sahil/Bengali: [phone]      Preadmission Nursing Department Fax Number: [phone] (Fax all pre-operative paperwork to this number)      For urgent matters arising during evenings, weekends, or holidays that cannot wait for normal business hours please call [phone] and ask for the Dermatology Resident On-Call to be paged.           - apply 5FU and salicyclic acid cream nightly to warts on feet. Wash hands after application

## 2022-02-08 NOTE — PROGRESS NOTES
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           History of Present Illness:  Mr. Artemio Nino is a 21 year old male with a history of BMT November 2018 and 2020 who presents for transplant skin check. Patient has no lesions of concerns. Denies any bleeding, tender, new or changing lesions. Denies use of sun protections, no major burns. Patient does have warts on bilateral feet that are not painful. No other concerns today aside from dry skin.      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)      History of blood transfusion      WPW (Aaron-Parkinson-White syndrome) 03/2018     Past Surgical History:   Procedure Laterality Date     BONE MARROW BIOPSY, BONE SPECIMEN, NEEDLE/TROCAR Right 11/27/2018    Procedure: bone marrow biopsy;  Surgeon: Jacqueline Fitzgerald NP;  Location: [facility]      BONE MARROW BIOPSY, BONE SPECIMEN,  NEEDLE/TROCAR N/A 2/8/2019    Procedure: BIOPSY BONE MARROW;  Surgeon: Lisa Workman NP;  Location: [facility]      BONE MARROW BIOPSY, BONE SPECIMEN, NEEDLE/TROCAR N/A 5/16/2019    Procedure: BIOPSY, BONE MARROW;  Surgeon: Lilia López, SNOW CNP;  Location: [facility]     BONE MARROW BIOPSY, BONE SPECIMEN, NEEDLE/TROCAR N/A 11/7/2019    Procedure: Bone marrow biopsy;  Surgeon: Jacqueline Shin NP;  Location: [facility]      BONE MARROW BIOPSY, BONE SPECIMEN, NEEDLE/TROCAR Right 6/10/2020    Procedure: BIOPSY, BONE MARROW;  Surgeon: Candido Han PA-C;  Location: [facility]      BONE MARROW BIOPSY, BONE SPECIMEN, NEEDLE/TROCAR N/A 8/24/2020    Procedure: Bone marrow biopsy;  Surgeon: Jacqueline Shin NP;  Location: [facility]      BONE MARROW BIOPSY, BONE SPECIMEN, NEEDLE/TROCAR N/A 9/25/2020    Procedure: BIOPSY, BONE MARROW;  Surgeon: Lilia López, SNOW CNP;  Location: [facility]      BONE MARROW BIOPSY, BONE SPECIMEN, NEEDLE/TROCAR N/A 3/11/2021    Procedure: BIOPSY BONE MARROW;  Surgeon: Lisa Workman NP;  [facility]: [facility]      BONE MARROW BIOPSY, BONE SPECIMEN, NEEDLE/TROCAR N/A 5/21/2021    Procedure: BIOPSY, BONE MARROW;  Surgeon: Jacqueline Shin NP;  [facility]: [facility]      BONE MARROW BIOPSY, BONE SPECIMEN, NEEDLE/TROCAR N/A 8/11/2021    Procedure: BIOPSY BONE MARROW;  Surgeon: Lisa Workman NP;  Location: [facility]      ESOPHAGOSCOPY, GASTROSCOPY, DUODENOSCOPY (EGD), COMBINED N/A 2/22/2019    Procedure: Upper endoscopy with biopsy;  Surgeon: Magdalene Hobson MD;  Location: [facility]      INSERT CATHETER VASCULAR ACCESS N/A 7/20/2020    Procedure: NON -APHERESIS Double lumen tunneled central burns line placement;  Surgeon: Pato Gómez PA-C;  Location: [facility]      INSERT CATHETER VASCULAR ACCESS N/A 2/8/2021    Procedure: apheresis catheter placement;  Surgeon: Pato Gómez PA-C;  Location: [facility]  S[facility]      INSERT CATHETER VASCULAR ACCESS APHERESIS CHILD N/A 6/10/2020    Procedure: Large Bore Double Lumen NON TUNNELED Apheresis Catheter placement;  Surgeon: Link Rios PA-C;  Location: [facility]      INSERT CATHETER VASCULAR ACCESS DOUBLE LUMEN CHILD N/A 10/26/2018    Procedure: double lumen tunneled line placement;  Surgeon: Rex Valente MD;  Location: [facility]      INSERT PORT VASCULAR ACCESS N/A 6/10/2020    Procedure: Port placement;  Surgeon: Link Rios PA-C;  Location: [facility]      IR CHEST PORT PLACEMENT > 5 YRS OF AGE  6/10/2020     IR CVC NON TUNNEL LINE REMOVAL  6/12/2020     IR CVC NON TUNNEL PLACEMENT  6/10/2020     IR CVC TUNNEL PLACEMENT > 5 YRS OF AGE  7/20/2020     IR CVC TUNNEL PLACEMENT > 5 YRS OF AGE  2/8/2021     IR CVC TUNNEL REMOVAL LEFT  2/8/2019     IR CVC TUNNEL REMOVAL LEFT  8/24/2020     IR CVC TUNNEL REMOVAL LEFT  4/9/2021     IR PORT REMOVAL LEFT  5/16/2019     O CLAVICLE LEFT Left     fracture with surgical repair and plate/screws     ORTHOPEDIC SURGERY      femur     REMOVE CATHETER VASCULAR ACCESS N/A 2/8/2019    Procedure: Tunneled line removal;  Surgeon: Krystal Esparza MD;  [facility]: [facility]      REMOVE CATHETER VASCULAR ACCESS N/A 8/24/2020    Procedure: tunneled line removal;  Surgeon: Siri Hernandez PA-C;  [facility]: [facility]      REMOVE CATHETER VASCULAR ACCESS Left 4/9/2021    Procedure: REMOVAL, VASCULAR ACCESS CATHETER;  Surgeon: Link Rios PA-C;  [facility]: [facility]      REMOVE PORT VASCULAR ACCESS N/A 5/16/2019    Procedure: REMOVAL, VASCULAR ACCESS PORT;  Surgeon: Link Rios PA-C;  [facility]     None, Healthy  Patient has a medical history of  BMT November 2018    Social History:  Does not currently hold job and is not in school    Family History:  No family history on file.      Medications:  Current Outpatient Medications   Medication Sig Dispense  Refill     Cholecalciferol (VITAMIN D3) 50 MCG (2000 UT) CAPS take 1 capsule (2,000 Units) by mouth every day x90 Days       sulfamethoxazole-trimethoprim (BACTRIM) 400-80 MG tablet Take 2 tablets by mouth Every Mon, Tues two times daily 32 tablet 1     dronabinol (MARINOL) 2.5 MG capsule Take 1 capsule (2.5 mg) by mouth 2 times daily (before meals) (Patient not taking: Reported on 8/18/2021) 60 capsule 1     fluconazole (DIFLUCAN) 200 MG tablet Take 400 mg by mouth (Patient not taking: Reported on 2/7/2022)       itraconazole (SPORANOX) 10 MG/ML solution itraconazole 10 mg/mL oral solution (Patient not taking: Reported on 2/7/2022)       itraconazole (SPORANOX) 100 MG capsule itraconazole 100 mg capsule (Patient not taking: Reported on 2/7/2022)       LORazepam (ATIVAN) 1 MG tablet Take 1 tablet (1 mg) by mouth 2 times daily as needed for anxiety (Patient not taking: Reported on 8/18/2021) 60 tablet 0     magnesium oxide (MAG-OX) 400 (241.3 Mg) MG tablet Take 1 tablet (400 mg) by mouth daily (Patient not taking: Reported on 8/18/2021) 60 tablet 3     omeprazole (PRILOSEC) 20 MG DR capsule omeprazole 20 mg capsule,delayed release (Patient not taking: Reported on 2/7/2022)       ondansetron (ZOFRAN) 8 MG tablet ondansetron HCl 8 mg tablet (Patient not taking: Reported on 2/7/2022)       oxyCODONE (ROXICODONE) 5 MG tablet oxycodone 5 mg tablet (Patient not taking: Reported on 2/7/2022)       pantoprazole (PROTONIX) 40 MG EC tablet Take 1 tablet (40 mg) by mouth daily (Patient not taking: Reported on 8/18/2021) 60 tablet 2     ruxolitinib (JAKAFI) 20 MG TABS tablet Take 1 tablet (20 mg) by mouth 2 times daily 60 tablet 1     tacrolimus (GENERIC EQUIVALENT) 1 MG capsule See Tacrolimus Suspension Rx for current dosing (Patient not taking: Reported on 8/18/2021) 60 capsule 3     tacrolimus (GENERIC EQUIVALENT) 1 mg/mL suspension Follow taper schedule (Patient not taking: Reported on 8/18/2021) 60 mL 3     tadalafil  "(CIALIS) 5 MG tablet tadalafil 5 mg tablet   TAKE ONE TABLET BY MOUTH EVERY DAY (Patient not taking: Reported on 2/7/2022)       valACYclovir (VALTREX) 1000 mg tablet valacyclovir 1 gram tablet (Patient not taking: Reported on 2/7/2022)         Allergies   Allergen Reactions     Blood Transfusion Related (Informational Only) Other (See Comments)     Patient has a history of a clinically significant antibody against RBC antigens.  A delay in compatible RBCs may occur.Stem cell transplant patient.  Give type O RBCs.  Requires Benadryl and tylenol as premed for platelets and RBCs for hx of hives     Voriconazole Photosensitivity     Significant rash - do not rechallenge       Review of Systems:  A 12 point ROS was performed today and was negative.    Physical exam:  Vitals: Ht 5' 9.09\" (175.5 cm)   Wt 64.2 kg (141 lb 8.6 oz)   BMI 20.84 kg/m    GEN: This is a well developed, well-nourished male in no acute distress, in a pleasant mood.    SKIN: FBSE  - Diffuse xerosis  - Large verrucous plaques with black puncta on bilateral feet  - Few dark brown to brown/pink papules on back, one measuring 0.8 cm near midline upper back. Almost appears scar like, but no concerning features on dermoscopy* Photo taken today  - No other lesions of concern on exam                          Impression/Plan:  # Clinically benign nevi  # s/p BMT  No lesions of concern today. Lesion on midline upper back almost appeared scar-like, no documented hx of biopsy and no concerning feaures on dermoscopy. Photodocumentation taken today and will monitor lesions  - Counseled on sun protection    # Plantar warts, bilateral. Large but not painful  - Start 5% fluorouracil/70% salicylic acid cream nightly. Sent this prescription to chemistry Rx.   Counseled to wash hands well after application and to avoid pets licking feet after application    # Diffuse xerosis  - Counseled on gentle skin care and emollient use    Follow-up in 6 months, earlier for new " or changing lesions.     Staff Involved:  Staff/Resident    Tisha Alcaraz PGY4  Dermatology Resident  pager      Patient seen and examined with         I have personally examined this patient and agree with the resident's documentation and plan of care.  I have reviewed and amended the resident's note above.  The documentation accurately reflects my clinical observations, diagnoses, treatment and follow-up plans.     Abdirizak Delacruz MD  Pediatric Dermatologist  , Dermatology and Pediatrics  [facility]

## 2022-02-08 NOTE — PROGRESS NOTES
HPI  Artemio Nino is a 21 year old male with a history of ALL s/p chemotherapy and bone marrow transplant who presents for comprehensive eye exam.  Denies blurry vision, eye pain, or irritation.  Has never worn glasses and feels vision is good.  He does not use any eye drops.     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        Assessment & Plan      (H04.123) Dry eyes, bilateral - Both Eyes  (primary encounter diagnosis)  Comment: asymptomatic, mild cornea signs both eyes today on exam  Plan: artificial tear drops as needed four times a day for fluctuating vision or eye irritation/dryness, avoid Visine or redness relieving drops  Call with changes     (H52.12) Myopia of left eye - Both Eyes  Comment:  Minimal refractive error with good visual acuity without correction no need for glasses correction  Plan: follow      -----------------------------------------------------------------------------------       Patient disposition:   Return in about 1 year (around 2/8/2023) for Comprehensive Exam. Patient to call sooner as needed.    Complete documentation of historical and exam elements from today's encounter can be found in the full encounter summary report (not reduplicated in this progress note). I personally obtained the chief complaint(s) and history of present illness.  I have confirmed and edited as necessary the CC, HPI, PMH/PSH, social history, FMH, ROS, and exam/neuro findings as obtained by the technician or others. I have examined this patient myself and I personally viewed the image(s) and studies listed above and the documentation reflects my findings and interpretation.      Camryn Johnson MD

## 2022-02-08 NOTE — H&P (VIEW-ONLY)
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 He presents to clinic today for a History and Physical prior to his 1 year anniversary BMBx/LP tomorrow.    Isael has generally been doing very well since his last visit with us 6 months ago. He has not been seen by his oncology team at [facility] in several months. He has had no hospitalizations, ED visits, or traumas. He received his annual influenza vaccine, and has received 2 doses of the Pfizer COVID vaccine with plans to receive a booster in the near future. He did develop COVID-19 approximately 4 weeks ago, experiencing mild URI symptoms that are now essentially resolved. He has a normal appetite and his energy and sleep patterns have been normal to his baseline. He saw a Urologist in December due to concerns of Erectile Dysfunction and was prescribed tadalafil, which he reports today that he is not taking. He does not see cardiology regularly for his WPW, and he has not yet seen a dentist since transplant.    Today he reports that he is feeling well without recent fever, cough, congestion, rhinorrhea, odynophagia, GI dysregulation, headache, vision/hearing changes, rashes, joint stiffness or pain, or difficulties with mobility or energy. He does endorse dry skin, which is baseline for him.    ROS: A complete review of systems is negative except as noted in HPI    Past Medical History  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)      History of blood transfusion      WPW (Aaron-Parkinson-White syndrome) 03/2018       Past Surgical History  Past Surgical History:   Procedure Laterality Date     BONE MARROW BIOPSY, BONE SPECIMEN, NEEDLE/TROCAR Right 11/27/2018    Procedure: bone marrow biopsy;   Surgeon: Jacqueline Fitzgerald NP;  [facility]      BONE MARROW BIOPSY, BONE SPECIMEN, NEEDLE/TROCAR N/A 2/8/2019    Procedure: BIOPSY BONE MARROW;  Surgeon: Lisa Workman NP;  [facility]      BONE MARROW BIOPSY, BONE SPECIMEN, NEEDLE/TROCAR N/A 5/16/2019    Procedure: BIOPSY, BONE MARROW;  Surgeon: Lilia López, SNOW CNP;  [facility]     BONE MARROW BIOPSY, BONE SPECIMEN, NEEDLE/TROCAR N/A 11/7/2019    Procedure: Bone marrow biopsy;  Surgeon: Jacqueline Shin NP;  [facility]      BONE MARROW BIOPSY, BONE SPECIMEN, NEEDLE/TROCAR Right 6/10/2020    Procedure: BIOPSY, BONE MARROW;  Surgeon: Candido Han PA-C;  [facility]      BONE MARROW BIOPSY, BONE SPECIMEN, NEEDLE/TROCAR N/A 8/24/2020    Procedure: Bone marrow biopsy;  Surgeon: Jacqueline Shin NP;  [facility]      BONE MARROW BIOPSY, BONE SPECIMEN, NEEDLE/TROCAR N/A 9/25/2020    Procedure: BIOPSY, BONE MARROW;  Surgeon: Lilia López, SNOW CNP;  [facility]      BONE MARROW BIOPSY, BONE SPECIMEN, NEEDLE/TROCAR N/A 3/11/2021    Procedure: BIOPSY BONE MARROW;  Surgeon: Lisa Workman NP;  [facility]      BONE MARROW BIOPSY, BONE SPECIMEN, NEEDLE/TROCAR N/A 5/21/2021    Procedure: BIOPSY, BONE MARROW;  Surgeon: Jacqueline Shin NP;  [facility]      BONE MARROW BIOPSY, BONE SPECIMEN, NEEDLE/TROCAR N/A 8/11/2021    Procedure: BIOPSY BONE MARROW;  Surgeon: Lisa Workman NP;  [facility]      ESOPHAGOSCOPY, GASTROSCOPY, DUODENOSCOPY (EGD), COMBINED N/A 2/22/2019    Procedure: Upper endoscopy with biopsy;  Surgeon: Magdalene Hobson MD;  [facility]      INSERT CATHETER VASCULAR ACCESS N/A 7/20/2020    Procedure: NON -APHERESIS Double lumen tunneled central burns line placement;  Surgeon: Pato Gómez PA-C;  [facility]      INSERT CATHETER VASCULAR ACCESS N/A 2/8/2021     Procedure: apheresis catheter placement;  Surgeon: Pato Gómez PA-C;  [facility]      INSERT CATHETER VASCULAR ACCESS APHERESIS CHILD N/A 6/10/2020    Procedure: Large Bore Double Lumen NON TUNNELED Apheresis Catheter placement;  Surgeon: Link Rios PA-C;  [facility]      INSERT CATHETER VASCULAR ACCESS DOUBLE LUMEN CHILD N/A 10/26/2018    Procedure: double lumen tunneled line placement;  Surgeon: Rex Valente MD;  [facility]      INSERT PORT VASCULAR ACCESS N/A 6/10/2020    Procedure: Port placement;  Surgeon: Link Rios PA-C;  [facility]      IR CHEST PORT PLACEMENT > 5 YRS OF AGE  6/10/2020     IR CVC NON TUNNEL LINE REMOVAL  6/12/2020     IR CVC NON TUNNEL PLACEMENT  6/10/2020     IR CVC TUNNEL PLACEMENT > 5 YRS OF AGE  7/20/2020     IR CVC TUNNEL PLACEMENT > 5 YRS OF AGE  2/8/2021     IR CVC TUNNEL REMOVAL LEFT  2/8/2019     IR CVC TUNNEL REMOVAL LEFT  8/24/2020     IR CVC TUNNEL REMOVAL LEFT  4/9/2021     IR PORT REMOVAL LEFT  5/16/2019     O CLAVICLE LEFT Left     fracture with surgical repair and plate/screws     ORTHOPEDIC SURGERY      femur     REMOVE CATHETER VASCULAR ACCESS N/A 2/8/2019    Procedure: Tunneled line removal;  Surgeon: Krystal Esparza MD;  [facility]      REMOVE CATHETER VASCULAR ACCESS N/A 8/24/2020    Procedure: tunneled line removal;  Surgeon: Siri Hernandez PA-C;  [facility]      REMOVE CATHETER VASCULAR ACCESS Left 4/9/2021    Procedure: REMOVAL, VASCULAR ACCESS CATHETER;  Surgeon: Link Rios PA-C;  [facility]      REMOVE PORT VASCULAR ACCESS N/A 5/16/2019    Procedure: REMOVAL, VASCULAR ACCESS PORT;  Surgeon: Link Rios PA-C;  [facility]       Family History  Non-contributory-- no recent changes    Social History  Playing soccer in a rec league. Currently not working nor attending school.      Medications  Cholecalciferol (VITAMIN D3) 50 MCG (2000 UT) CAPS, take 1 capsule (2,000 Units) by mouth every day x90 Days  dronabinol (MARINOL) 2.5 MG capsule, Take 1 capsule (2.5 mg) by mouth 2 times daily (before meals) (Patient not taking: Reported on 8/18/2021)  fluconazole (DIFLUCAN) 200 MG tablet, Take 400 mg by mouth (Patient not taking: Reported on 2/7/2022)  itraconazole (SPORANOX) 10 MG/ML solution, itraconazole 10 mg/mL oral solution (Patient not taking: Reported on 2/7/2022)  itraconazole (SPORANOX) 100 MG capsule, itraconazole 100 mg capsule (Patient not taking: Reported on 2/7/2022)  LORazepam (ATIVAN) 1 MG tablet, Take 1 tablet (1 mg) by mouth 2 times daily as needed for anxiety (Patient not taking: Reported on 8/18/2021)  magnesium oxide (MAG-OX) 400 (241.3 Mg) MG tablet, Take 1 tablet (400 mg) by mouth daily (Patient not taking: Reported on 8/18/2021)  omeprazole (PRILOSEC) 20 MG DR capsule, omeprazole 20 mg capsule,delayed release (Patient not taking: Reported on 2/7/2022)  ondansetron (ZOFRAN) 8 MG tablet, ondansetron HCl 8 mg tablet (Patient not taking: Reported on 2/7/2022)  oxyCODONE (ROXICODONE) 5 MG tablet, oxycodone 5 mg tablet (Patient not taking: Reported on 2/7/2022)  pantoprazole (PROTONIX) 40 MG EC tablet, Take 1 tablet (40 mg) by mouth daily (Patient not taking: Reported on 8/18/2021)  ruxolitinib (JAKAFI) 20 MG TABS tablet, Take 1 tablet (20 mg) by mouth 2 times daily  sulfamethoxazole-trimethoprim (BACTRIM) 400-80 MG tablet, Take 2 tablets by mouth Every Mon, Tues two times daily  tacrolimus (GENERIC EQUIVALENT) 1 MG capsule, See Tacrolimus Suspension Rx for current dosing (Patient not taking: Reported on 8/18/2021)  tacrolimus (GENERIC EQUIVALENT) 1 mg/mL suspension, Follow taper schedule (Patient not taking: Reported on 8/18/2021)  tadalafil (CIALIS) 5 MG tablet, tadalafil 5 mg tablet   TAKE ONE TABLET BY MOUTH EVERY DAY (Patient not taking: Reported on 2/7/2022)  valACYclovir  (VALTREX) 1000 mg tablet, valacyclovir 1 gram tablet (Patient not taking: Reported on 2/7/2022)    No current facility-administered medications on file prior to visit.      Allergies   Allergies   Allergen Reactions     Blood Transfusion Related (Informational Only) Other (See Comments)     Patient has a history of a clinically significant antibody against RBC antigens.  A delay in compatible RBCs may occur.Stem cell transplant patient.  Give type O RBCs.  Requires Benadryl and tylenol as premed for platelets and RBCs for hx of hives     Voriconazole Photosensitivity     Significant rash - do not rechallenge       Physical Exam   Vital Signs for Peds 2/8/2022   SYSTOLIC 112   DIASTOLIC 73   PULSE 83   TEMPERATURE 98.7   RESPIRATIONS 18   WEIGHT (kg) 63.9 kg   HEIGHT (cm) 175.4 cm   BMI 20.77   pain    O2 98     GEN: awake, alert, appears stated age, well-groomed and casually dressed. NAD. Father present in person, step-mother present by phone.  HEENT: NC/AT, full head of hair, PERRL, EOMI, nares patent, dentition in good repair, oropharynx unremarkable and without tonsillar erythema or exudate, no cervical LAD, neck supple, trachea midline  CARD: RRR, no m/r/g, cap refill <2 sec, radial pulses 2+ bilaterally  RESP: CTAB, no adventitious sounds, normal S1/S2  ABD: soft, flat, NT/ND, +BS  EXTREM: WWP, MAEE  SKIN: globally dry, no rashes or lesions on exposed skin  ACCESS: none    Labs  Results for orders placed or performed during the hospital encounter of 02/07/22 (from the past 24 hour(s))   XR Hand Bone Age    Narrative    XR HAND BONE AGE     HISTORY: S/P allogeneic bone marrow transplant (H); Acute  lymphoblastic leukemia (ALL) in relapse (H)    COMPARISON: 11/16/2019    FINDINGS:   The patient's chronologic age is 21 years, 7 months.  Skeletally mature left hand.   No standard deviation is available for a male at this age.      Impression    IMPRESSION: Complete skeletal maturation of the left hand.     GWENDOLYN  MD LUCHO         SYSTEM ID:  [id number]       Assessment and Plan   20 yo male with a history of multiply-relapsed pre-B cell ALL now 1 year post-second BMT ([facility]). Clinically well today, no concerns for undergoing sedated procedures 2/9.    BMT:  #  Primary diagnosis: high-risk B cell ALL (CNS neg) + JAK2 activation: s/p MSD BMT (relapsed at 1.5 years post-BMT), [facility] (lost B cell aplasia and had new clone at day 60 post-[facility]) and PLAT-05 (CAR19/22) at [place] (loss of CD22 CAR and rejection of CD19 CAR without evidence of disease day +21). Of note, initial post-CAR-T therapy was complicated by Grade III CRS necessitating Tocilizumab x3, dopamine pressor support, and steroids. No CRS, neurotoxicity, or infectious complications for [place] CAR-T. He received preparative regimen for 2nd BMT per [id number] with Thiotepa, Fludarabine, Busulfan, and ATG and received a UCBT on 2/16/21. Engrafted day +20. Day +21 peripheral VNTRs and marrow chimerism 100% with no evidence of disease. Day 100 and 180 studies 100% donor, also with no evidence of disease.  - 1 year BMBx/LP tomorrow 2/9-- per Dr. Solomon, will send Clonoseq MRD testing  - continue Ruxolitinib 20 mg BID, will likely discontinue once marrow results available  - will defer 1 year vaccines until off of ruxolitinib for 6 months  - if marrow results normal, Isael is interested in undergoing port removal in the near future  - continue 1 year anniversary evaluations/consults, will have summary visit with Dr. Solomon 2/16; will obtain 1 year anniversary labwork 2/9 in sedation unit    #  Risk for GVHD: no current concerns    FEN/Renal:  # Risk for malnutrition: eating regular diet, no current concern    Cardiovascular:  # WPW: asymptomatic, diagnosed 2018  - EKG today with WPW, will obtain Echo during 1 year anniversary evaluations    Heme:   # Pancytopenia secondary to chemotherapy: resolved, transfusion independent    Infectious Disease:  # Risk for infection given  immunocompromised status  Active: None  Prophylaxis:        --PJP prophylaxis: Bactrim-- will likely discontinue at 2/16 visit  Past infections:   - COVID19 January 2022    :  # Erectile Dysfunction: Cialis PRN per Urology    SEBAS Menendez, PA-C  Pediatric Blood and Marrow Transplant & Cellular Therapy Program  [facility]  Pager: [phone]  Fax: [phone]    I spent a total of 75 minutes with Artemio Nino on the date of encounter doing chart review, history and exam, review of labs/imaging, discussion with the family, documentation and further activities as noted above.     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

## 2022-02-08 NOTE — LETTER
2/8/2022      RE: Artemio Nino  [address] [id number]       [facility] Dermatology Note      Dermatology Problem List:  # Dermatitis--s/p transplant February 2018 and 2020, neck, trunk, and distal arms  -Bx'ed 12/6/18, unsure if GVHD or a different eczematous process  -Voriconazole D/c'd in December  # Demodex dermatitis - s/p Ivermectin, soolantra/protopic  #Hx staph scalded skin syndrome  - Daily bleach baths, cephalexin 500 mg TID for 7 days, mupirocin and Vaseline  # Plantar warts  - 5% fluorouracil and 70% salicylic acid cream    Encounter Date: Feb 8, 2022    CC:  Chief Complaint   Patient presents with     RECHECK     Annual BMT Check.           History of Present Illness:  Mr. Artemio Nino is a 21 year old male with a history of BMT November 2018 and 2020 who presents for transplant skin check. Patient has no lesions of concerns. Denies any bleeding, tender, new or changing lesions. Denies use of sun protections, no major burns. Patient does have warts on bilateral feet that are not painful. No other concerns today aside from dry skin.      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)      History of blood transfusion      WPW (Aaron-Parkinson-White syndrome) 03/2018     Past Surgical History:   Procedure Laterality Date     BONE MARROW BIOPSY, BONE SPECIMEN, NEEDLE/TROCAR Right 11/27/2018    Procedure: bone marrow biopsy;  Surgeon: Lia  Jacqueline PLAZA NP;  [facility]      BONE MARROW BIOPSY, BONE SPECIMEN, NEEDLE/TROCAR N/A 2/8/2019    Procedure: BIOPSY BONE MARROW;  Surgeon: Lisa Workman NP;  [facility]      BONE MARROW BIOPSY, BONE SPECIMEN, NEEDLE/TROCAR N/A 5/16/2019    Procedure: BIOPSY, BONE MARROW;  Surgeon: Lilia López, SNOW CNP;  [facility]     BONE MARROW BIOPSY, BONE SPECIMEN, NEEDLE/TROCAR N/A 11/7/2019    Procedure: Bone marrow biopsy;  Surgeon: Jacqueline Shin NP;  [facility]      BONE MARROW BIOPSY, BONE SPECIMEN, NEEDLE/TROCAR Right 6/10/2020    Procedure: BIOPSY, BONE MARROW;  Surgeon: Candido Han PA-C;  [facility]      BONE MARROW BIOPSY, BONE SPECIMEN, NEEDLE/TROCAR N/A 8/24/2020    Procedure: Bone marrow biopsy;  Surgeon: Jacqueline Shin NP;  [facility]      BONE MARROW BIOPSY, BONE SPECIMEN, NEEDLE/TROCAR N/A 9/25/2020    Procedure: BIOPSY, BONE MARROW;  Surgeon: Lilia López, SNOW CNP;  [facility]      BONE MARROW BIOPSY, BONE SPECIMEN, NEEDLE/TROCAR N/A 3/11/2021    Procedure: BIOPSY BONE MARROW;  Surgeon: Lisa Workman NP;  [facility]      BONE MARROW BIOPSY, BONE SPECIMEN, NEEDLE/TROCAR N/A 5/21/2021    Procedure: BIOPSY, BONE MARROW;  Surgeon: Jacqueline Shin NP;  [facility]      BONE MARROW BIOPSY, BONE SPECIMEN, NEEDLE/TROCAR N/A 8/11/2021    Procedure: BIOPSY BONE MARROW;  Surgeon: Lisa Workman NP;  [facility]      ESOPHAGOSCOPY, GASTROSCOPY, DUODENOSCOPY (EGD), COMBINED N/A 2/22/2019    Procedure: Upper endoscopy with biopsy;  Surgeon: Magdalene Hobson MD;  [facility]      INSERT CATHETER VASCULAR ACCESS N/A 7/20/2020    Procedure: NON -APHERESIS Double lumen tunneled central burns line placement;  Surgeon: Pato Gómez PA-C;  [facility]      INSERT CATHETER VASCULAR ACCESS N/A 2/8/2021    Procedure:  apheresis catheter placement;  Surgeon: Pato Gómez PA-C;  [facility]      INSERT CATHETER VASCULAR ACCESS APHERESIS CHILD N/A 6/10/2020    Procedure: Large Bore Double Lumen NON TUNNELED Apheresis Catheter placement;  Surgeon: Link Rios PA-C;  [facility]      INSERT CATHETER VASCULAR ACCESS DOUBLE LUMEN CHILD N/A 10/26/2018    Procedure: double lumen tunneled line placement;  Surgeon: Rex Valente MD;  [facility]      INSERT PORT VASCULAR ACCESS N/A 6/10/2020    Procedure: Port placement;  Surgeon: Link Rios PA-C;  [facility]      IR CHEST PORT PLACEMENT > 5 YRS OF AGE  6/10/2020     IR CVC NON TUNNEL LINE REMOVAL  6/12/2020     IR CVC NON TUNNEL PLACEMENT  6/10/2020     IR CVC TUNNEL PLACEMENT > 5 YRS OF AGE  7/20/2020     IR CVC TUNNEL PLACEMENT > 5 YRS OF AGE  2/8/2021     IR CVC TUNNEL REMOVAL LEFT  2/8/2019     IR CVC TUNNEL REMOVAL LEFT  8/24/2020     IR CVC TUNNEL REMOVAL LEFT  4/9/2021     IR PORT REMOVAL LEFT  5/16/2019     O CLAVICLE LEFT Left     fracture with surgical repair and plate/screws     ORTHOPEDIC SURGERY      femur     REMOVE CATHETER VASCULAR ACCESS N/A 2/8/2019    Procedure: Tunneled line removal;  Surgeon: Krystal Esparza MD;  [facility]      REMOVE CATHETER VASCULAR ACCESS N/A 8/24/2020    Procedure: tunneled line removal;  Surgeon: Siri Hernandez PA-C;  [facility]      REMOVE CATHETER VASCULAR ACCESS Left 4/9/2021    Procedure: REMOVAL, VASCULAR ACCESS CATHETER;  Surgeon: Link Rios PA-C;  [facility]      REMOVE PORT VASCULAR ACCESS N/A 5/16/2019    Procedure: REMOVAL, VASCULAR ACCESS PORT;  Surgeon: Link Rios PA-C;  [facility]     None, Healthy  Patient has a medical history of  BMT November 2018    Social History:  Does not currently hold job and is not in school    Family History:  No family history  on file.      Medications:  Current Outpatient Medications   Medication Sig Dispense Refill     Cholecalciferol (VITAMIN D3) 50 MCG (2000 UT) CAPS take 1 capsule (2,000 Units) by mouth every day x90 Days       sulfamethoxazole-trimethoprim (BACTRIM) 400-80 MG tablet Take 2 tablets by mouth Every Mon, Tues two times daily 32 tablet 1     dronabinol (MARINOL) 2.5 MG capsule Take 1 capsule (2.5 mg) by mouth 2 times daily (before meals) (Patient not taking: Reported on 8/18/2021) 60 capsule 1     fluconazole (DIFLUCAN) 200 MG tablet Take 400 mg by mouth (Patient not taking: Reported on 2/7/2022)       itraconazole (SPORANOX) 10 MG/ML solution itraconazole 10 mg/mL oral solution (Patient not taking: Reported on 2/7/2022)       itraconazole (SPORANOX) 100 MG capsule itraconazole 100 mg capsule (Patient not taking: Reported on 2/7/2022)       LORazepam (ATIVAN) 1 MG tablet Take 1 tablet (1 mg) by mouth 2 times daily as needed for anxiety (Patient not taking: Reported on 8/18/2021) 60 tablet 0     magnesium oxide (MAG-OX) 400 (241.3 Mg) MG tablet Take 1 tablet (400 mg) by mouth daily (Patient not taking: Reported on 8/18/2021) 60 tablet 3     omeprazole (PRILOSEC) 20 MG DR capsule omeprazole 20 mg capsule,delayed release (Patient not taking: Reported on 2/7/2022)       ondansetron (ZOFRAN) 8 MG tablet ondansetron HCl 8 mg tablet (Patient not taking: Reported on 2/7/2022)       oxyCODONE (ROXICODONE) 5 MG tablet oxycodone 5 mg tablet (Patient not taking: Reported on 2/7/2022)       pantoprazole (PROTONIX) 40 MG EC tablet Take 1 tablet (40 mg) by mouth daily (Patient not taking: Reported on 8/18/2021) 60 tablet 2     ruxolitinib (JAKAFI) 20 MG TABS tablet Take 1 tablet (20 mg) by mouth 2 times daily 60 tablet 1     tacrolimus (GENERIC EQUIVALENT) 1 MG capsule See Tacrolimus Suspension Rx for current dosing (Patient not taking: Reported on 8/18/2021) 60 capsule 3     tacrolimus (GENERIC EQUIVALENT) 1 mg/mL suspension Follow  "taper schedule (Patient not taking: Reported on 8/18/2021) 60 mL 3     tadalafil (CIALIS) 5 MG tablet tadalafil 5 mg tablet   TAKE ONE TABLET BY MOUTH EVERY DAY (Patient not taking: Reported on 2/7/2022)       valACYclovir (VALTREX) 1000 mg tablet valacyclovir 1 gram tablet (Patient not taking: Reported on 2/7/2022)         Allergies   Allergen Reactions     Blood Transfusion Related (Informational Only) Other (See Comments)     Patient has a history of a clinically significant antibody against RBC antigens.  A delay in compatible RBCs may occur.Stem cell transplant patient.  Give type O RBCs.  Requires Benadryl and tylenol as premed for platelets and RBCs for hx of hives     Voriconazole Photosensitivity     Significant rash - do not rechallenge       Review of Systems:  A 12 point ROS was performed today and was negative.    Physical exam:  Vitals: Ht 5' 9.09\" (175.5 cm)   Wt 64.2 kg (141 lb 8.6 oz)   BMI 20.84 kg/m    GEN: This is a well developed, well-nourished male in no acute distress, in a pleasant mood.    SKIN: FBSE  - Diffuse xerosis  - Large verrucous plaques with black puncta on bilateral feet  - Few dark brown to brown/pink papules on back, one measuring 0.8 cm near midline upper back. Almost appears scar like, but no concerning features on dermoscopy* Photo taken today  - No other lesions of concern on exam                          Impression/Plan:  # Clinically benign nevi  # s/p BMT  No lesions of concern today. Lesion on midline upper back almost appeared scar-like, no documented hx of biopsy and no concerning feaures on dermoscopy. Photodocumentation taken today and will monitor lesions  - Counseled on sun protection    # Plantar warts, bilateral. Large but not painful  - Start 5% fluorouracil/70% salicylic acid cream nightly. Sent this prescription to chemistry Rx.   Counseled to wash hands well after application and to avoid pets licking feet after application    # Diffuse xerosis  - Counseled " on gentle skin care and emollient use    Follow-up in 6 months, earlier for new or changing lesions.     Staff Involved:  Staff/Resident    Tisha Alcaraz PGY4  Dermatology Resident  pager      Patient seen and examined with         I have personally examined this patient and agree with the resident's documentation and plan of care.  I have reviewed and amended the resident's note above.  The documentation accurately reflects my clinical observations, diagnoses, treatment and follow-up plans.     Abdirizak Delacruz MD  Pediatric Dermatologist  , Dermatology and Pediatrics  [facility]

## 2022-02-08 NOTE — NURSING NOTE
"Chief Complaint   Patient presents with     Follow Up     H&P w/Janee Olmos/EKG/Covid Test     /73   Pulse 83   Temp 98.7  F (37.1  C) (Oral)   Resp 18   Ht 1.754 m (5' 9.06\")   Wt 63.9 kg (140 lb 14 oz)   SpO2 98%   BMI 20.77 kg/m      No Pain (0)  Data Unavailable    I have reviewed the patients medication and allergy list.    Patient needs refills: no    Dressing change needed? No    EKG needed? Yes    Yeimi Desai, [facility]  February 8, 2022  "

## 2022-02-08 NOTE — NURSING NOTE
Chief Complaints and History of Present Illnesses   Patient presents with     COMPREHENSIVE EYE EXAM     1 year post bone marrow transplant      Chief Complaint(s) and History of Present Illness(es)     COMPREHENSIVE EYE EXAM     Laterality: both eyes    Associated symptoms: Negative for eye pain, redness, tearing and discharge    Treatments tried: no treatments    Pain scale: 0/10    Comments: 1 year post bone marrow transplant               Comments     Not have an issues with vision each eye. Seeing well distance and near each eye.     CHELITA Paul COT 1:35 PM February 8, 2022

## 2022-02-08 NOTE — NURSING NOTE
"[facility] [[id number]]  Chief Complaint   Patient presents with     RECHECK     Annual BMT Check.     Initial Ht 5' 9.09\" (175.5 cm)   Wt 141 lb 8.6 oz (64.2 kg)   BMI 20.84 kg/m   Estimated body mass index is 20.84 kg/m  as calculated from the following:    Height as of this encounter: 5' 9.09\" (175.5 cm).    Weight as of this encounter: 141 lb 8.6 oz (64.2 kg).  Medication Reconciliation: complete    Has the patient received a flu shot this year? Yes    If no, do they want one today? No    Abbie Valenzuela CMA    "

## 2022-02-09 ENCOUNTER — HOSPITAL ENCOUNTER (OUTPATIENT)
Facility: CLINIC | Age: 22
Discharge: HOME | End: 2022-02-09
Attending: NURSE PRACTITIONER | Admitting: ANESTHESIOLOGY
Payer: COMMERCIAL

## 2022-02-09 ENCOUNTER — ANESTHESIA EVENT (OUTPATIENT)
Dept: PEDIATRICS | Facility: CLINIC | Age: 22
End: 2022-02-09
Payer: COMMERCIAL

## 2022-02-09 ENCOUNTER — ANESTHESIA (OUTPATIENT)
Dept: PEDIATRICS | Facility: CLINIC | Age: 22
End: 2022-02-09
Payer: COMMERCIAL

## 2022-02-09 ENCOUNTER — APPOINTMENT (OUTPATIENT)
Dept: LAB | Facility: CLINIC | Age: 22
End: 2022-02-09
Attending: PEDIATRICS
Payer: COMMERCIAL

## 2022-02-09 VITALS
DIASTOLIC BLOOD PRESSURE: 67 MMHG | OXYGEN SATURATION: 99 % | WEIGHT: 139.11 LBS | TEMPERATURE: 97.6 F | RESPIRATION RATE: 16 BRPM | BODY MASS INDEX: 20.54 KG/M2 | HEART RATE: 61 BPM | SYSTOLIC BLOOD PRESSURE: 95 MMHG

## 2022-02-09 DIAGNOSIS — C91.01 ACUTE LYMPHOBLASTIC LEUKEMIA (ALL) IN REMISSION (H): ICD-10-CM

## 2022-02-09 DIAGNOSIS — C91.02 ACUTE LYMPHOBLASTIC LEUKEMIA (ALL) IN RELAPSE (H): Primary | ICD-10-CM

## 2022-02-09 LAB
CMV IGG SERPL IA-ACNC: 0.45 U/ML
CMV IGG SERPL IA-ACNC: NORMAL
GLUCOSE CSF-MCNC: 59 MG/DL (ref 40–70)
HBV CORE AB SERPL QL IA: NONREACTIVE
HBV SURFACE AG SERPL QL IA: NONREACTIVE
HCV AB SERPL QL IA: NONREACTIVE
HIV 1+2 AB+HIV1 P24 AG SERPL QL IA: NONREACTIVE
HOLD SPECIMEN: NORMAL
LAB DIRECTOR COMMENTS: NORMAL
LAB DIRECTOR COMMENTS: NORMAL
LAB DIRECTOR DISCLAIMER: NORMAL
LAB DIRECTOR DISCLAIMER: NORMAL
LAB DIRECTOR INTERPRETATION: NORMAL
LAB DIRECTOR INTERPRETATION: NORMAL
LAB DIRECTOR METHODOLOGY: NORMAL
LAB DIRECTOR METHODOLOGY: NORMAL
LAB DIRECTOR RESULTS: NORMAL
LAB DIRECTOR RESULTS: NORMAL
LH SERPL-ACNC: 2.9 IU/L (ref 1.5–9.3)
PROT CSF-MCNC: 35 MG/DL (ref 15–60)
SPECIMEN DESCRIPTION: NORMAL
SPECIMEN DESCRIPTION: NORMAL
T PALLIDUM AB SER QL: NONREACTIVE

## 2022-02-09 PROCEDURE — 88184 FLOWCYTOMETRY/ TC 1 MARKER: CPT | Mod: 59 | Performed by: PHYSICIAN ASSISTANT

## 2022-02-09 PROCEDURE — 999N000010 HC STATISTIC ANES STAT CODE-CRNA PER MINUTE: Performed by: PHYSICIAN ASSISTANT

## 2022-02-09 PROCEDURE — 250N000009 HC RX 250: Performed by: PHYSICIAN ASSISTANT

## 2022-02-09 PROCEDURE — 86644 CMV ANTIBODY: CPT | Performed by: PEDIATRICS

## 2022-02-09 PROCEDURE — 81261 IGH GENE REARRANGE AMP METH: CPT | Mod: XU | Performed by: PHYSICIAN ASSISTANT

## 2022-02-09 PROCEDURE — 250N000009 HC RX 250: Performed by: NURSE ANESTHETIST, CERTIFIED REGISTERED

## 2022-02-09 PROCEDURE — 88305 TISSUE EXAM BY PATHOLOGIST: CPT | Mod: TC | Performed by: PHYSICIAN ASSISTANT

## 2022-02-09 PROCEDURE — G0452 MOLECULAR PATHOLOGY INTERPR: HCPCS | Mod: 26 | Performed by: PATHOLOGY

## 2022-02-09 PROCEDURE — 87340 HEPATITIS B SURFACE AG IA: CPT | Performed by: PEDIATRICS

## 2022-02-09 PROCEDURE — 86790 VIRUS ANTIBODY NOS: CPT | Performed by: PEDIATRICS

## 2022-02-09 PROCEDURE — 370N000017 HC ANESTHESIA TECHNICAL FEE, PER MIN: Performed by: PHYSICIAN ASSISTANT

## 2022-02-09 PROCEDURE — 86803 HEPATITIS C AB TEST: CPT | Performed by: PEDIATRICS

## 2022-02-09 PROCEDURE — 88185 FLOWCYTOMETRY/TC ADD-ON: CPT | Performed by: PHYSICIAN ASSISTANT

## 2022-02-09 PROCEDURE — 88311 DECALCIFY TISSUE: CPT | Mod: TC | Performed by: PHYSICIAN ASSISTANT

## 2022-02-09 PROCEDURE — G0452 MOLECULAR PATHOLOGY INTERPR: HCPCS | Mod: 26 | Performed by: STUDENT IN AN ORGANIZED HEALTH CARE EDUCATION/TRAINING PROGRAM

## 2022-02-09 PROCEDURE — 88311 DECALCIFY TISSUE: CPT | Mod: 26 | Performed by: PATHOLOGY

## 2022-02-09 PROCEDURE — 84157 ASSAY OF PROTEIN OTHER: CPT | Performed by: PHYSICIAN ASSISTANT

## 2022-02-09 PROCEDURE — 250N000009 HC RX 250

## 2022-02-09 PROCEDURE — 85097 BONE MARROW INTERPRETATION: CPT | Mod: GC | Performed by: PATHOLOGY

## 2022-02-09 PROCEDURE — 88108 CYTOPATH CONCENTRATE TECH: CPT | Mod: 26 | Performed by: PATHOLOGY

## 2022-02-09 PROCEDURE — 89050 BODY FLUID CELL COUNT: CPT | Performed by: PHYSICIAN ASSISTANT

## 2022-02-09 PROCEDURE — 82306 VITAMIN D 25 HYDROXY: CPT | Performed by: PEDIATRICS

## 2022-02-09 PROCEDURE — 62270 DX LMBR SPI PNXR: CPT | Performed by: PHYSICIAN ASSISTANT

## 2022-02-09 PROCEDURE — 999N000131 HC STATISTIC POST-PROCEDURE RECOVERY CARE: Performed by: PHYSICIAN ASSISTANT

## 2022-02-09 PROCEDURE — 38222 DX BONE MARROW BX & ASPIR: CPT | Performed by: PHYSICIAN ASSISTANT

## 2022-02-09 PROCEDURE — 999N000141 HC STATISTIC PRE-PROCEDURE NURSING ASSESSMENT: Performed by: PHYSICIAN ASSISTANT

## 2022-02-09 PROCEDURE — 87521 HEPATITIS C PROBE&RVRS TRNSC: CPT | Mod: XU | Performed by: PEDIATRICS

## 2022-02-09 PROCEDURE — 81450 HL NEO GSAP 5-50DNA/DNA&RNA: CPT | Performed by: PEDIATRICS

## 2022-02-09 PROCEDURE — 88108 CYTOPATH CONCENTRATE TECH: CPT | Mod: TC | Performed by: PHYSICIAN ASSISTANT

## 2022-02-09 PROCEDURE — 250N000011 HC RX IP 250 OP 636: Performed by: NURSE ANESTHETIST, CERTIFIED REGISTERED

## 2022-02-09 PROCEDURE — 250N000011 HC RX IP 250 OP 636

## 2022-02-09 PROCEDURE — 87389 HIV-1 AG W/HIV-1&-2 AB AG IA: CPT | Performed by: PEDIATRICS

## 2022-02-09 PROCEDURE — 88368 INSITU HYBRIDIZATION MANUAL: CPT | Mod: 26 | Performed by: MEDICAL GENETICS

## 2022-02-09 PROCEDURE — 86780 TREPONEMA PALLIDUM: CPT | Performed by: PEDIATRICS

## 2022-02-09 PROCEDURE — G0463 HOSPITAL OUTPT CLINIC VISIT: HCPCS | Mod: 25 | Performed by: PHYSICIAN ASSISTANT

## 2022-02-09 PROCEDURE — 86704 HEP B CORE ANTIBODY TOTAL: CPT | Performed by: PEDIATRICS

## 2022-02-09 PROCEDURE — 272N000008 HC KIT BIOPSY BONE MARROW: Performed by: PHYSICIAN ASSISTANT

## 2022-02-09 PROCEDURE — 82945 GLUCOSE OTHER FLUID: CPT | Performed by: PHYSICIAN ASSISTANT

## 2022-02-09 PROCEDURE — 83002 ASSAY OF GONADOTROPIN (LH): CPT | Performed by: PHYSICIAN ASSISTANT

## 2022-02-09 PROCEDURE — 88187 FLOWCYTOMETRY/READ 2-8: CPT | Mod: GC | Performed by: PATHOLOGY

## 2022-02-09 PROCEDURE — 88305 TISSUE EXAM BY PATHOLOGIST: CPT | Mod: 26 | Performed by: PATHOLOGY

## 2022-02-09 PROCEDURE — 88275 CYTOGENETICS 100-300: CPT | Performed by: PHYSICIAN ASSISTANT

## 2022-02-09 PROCEDURE — 88264 CHROMOSOME ANALYSIS 20-25: CPT | Performed by: PHYSICIAN ASSISTANT

## 2022-02-09 PROCEDURE — 36415 COLL VENOUS BLD VENIPUNCTURE: CPT

## 2022-02-09 PROCEDURE — 81207 BCR/ABL1 GENE MINOR BP: CPT | Performed by: PHYSICIAN ASSISTANT

## 2022-02-09 PROCEDURE — 88188 FLOWCYTOMETRY/READ 9-15: CPT | Mod: GC | Performed by: PATHOLOGY

## 2022-02-09 PROCEDURE — 258N000003 HC RX IP 258 OP 636: Performed by: NURSE ANESTHETIST, CERTIFIED REGISTERED

## 2022-02-09 RX ORDER — EPHEDRINE SULFATE 50 MG/ML
INJECTION, SOLUTION INTRAMUSCULAR; INTRAVENOUS; SUBCUTANEOUS PRN
Status: DISCONTINUED | OUTPATIENT
Start: 2022-02-09 | End: 2022-02-09

## 2022-02-09 RX ORDER — SODIUM CHLORIDE, SODIUM LACTATE, POTASSIUM CHLORIDE, CALCIUM CHLORIDE 600; 310; 30; 20 MG/100ML; MG/100ML; MG/100ML; MG/100ML
INJECTION, SOLUTION INTRAVENOUS CONTINUOUS PRN
Status: DISCONTINUED | OUTPATIENT
Start: 2022-02-09 | End: 2022-02-09

## 2022-02-09 RX ORDER — FENTANYL CITRATE 50 UG/ML
INJECTION, SOLUTION INTRAMUSCULAR; INTRAVENOUS PRN
Status: DISCONTINUED | OUTPATIENT
Start: 2022-02-09 | End: 2022-02-09

## 2022-02-09 RX ORDER — PROPOFOL 10 MG/ML
INJECTION, EMULSION INTRAVENOUS CONTINUOUS PRN
Status: DISCONTINUED | OUTPATIENT
Start: 2022-02-09 | End: 2022-02-09

## 2022-02-09 RX ORDER — PROPOFOL 10 MG/ML
INJECTION, EMULSION INTRAVENOUS PRN
Status: DISCONTINUED | OUTPATIENT
Start: 2022-02-09 | End: 2022-02-09

## 2022-02-09 RX ORDER — HEPARIN SODIUM (PORCINE) LOCK FLUSH IV SOLN 100 UNIT/ML 100 UNIT/ML
SOLUTION INTRAVENOUS
Status: COMPLETED
Start: 2022-02-09 | End: 2022-02-09

## 2022-02-09 RX ORDER — LIDOCAINE 40 MG/G
CREAM TOPICAL
Status: COMPLETED | OUTPATIENT
Start: 2022-02-09 | End: 2022-02-09

## 2022-02-09 RX ORDER — LIDOCAINE 40 MG/G
CREAM TOPICAL
Status: COMPLETED
Start: 2022-02-09 | End: 2022-02-09

## 2022-02-09 RX ADMIN — LIDOCAINE 1 APPLICATION.: 40 CREAM TOPICAL at 10:33

## 2022-02-09 RX ADMIN — Medication 500 UNITS: at 12:40

## 2022-02-09 RX ADMIN — Medication 5 MG: at 11:33

## 2022-02-09 RX ADMIN — PROPOFOL 50 MG: 10 INJECTION, EMULSION INTRAVENOUS at 11:05

## 2022-02-09 RX ADMIN — Medication 5 MG: at 11:21

## 2022-02-09 RX ADMIN — FENTANYL CITRATE 25 MCG: 50 INJECTION, SOLUTION INTRAMUSCULAR; INTRAVENOUS at 11:05

## 2022-02-09 RX ADMIN — PROPOFOL 300 MCG/KG/MIN: 10 INJECTION, EMULSION INTRAVENOUS at 11:05

## 2022-02-09 RX ADMIN — Medication 5 MG: at 11:26

## 2022-02-09 RX ADMIN — SODIUM CHLORIDE, SODIUM LACTATE, POTASSIUM CHLORIDE, CALCIUM CHLORIDE: 600; 310; 30; 20 INJECTION, SOLUTION INTRAVENOUS at 11:05

## 2022-02-09 RX ADMIN — Medication 5 MG: at 11:30

## 2022-02-09 ASSESSMENT — ENCOUNTER SYMPTOMS: APNEA: 0

## 2022-02-09 NOTE — DISCHARGE INSTRUCTIONS
[facility]   [phone]    Care post Lumbar Puncture     Do not remove bandage/dressing for 24 hours -- after this time they can be removed    No bath, shower or soaking of the dressing for 24 hours    Activity as tolerated by the patient    Diet as able to tolerated    May use Tylenol as needed for pain control -- DO NOT use Ibuprofen    Can apply icepack to the site for discomfort -- no more than 10 minutes at a time    If bleeding presents apply pressure for 5 minutes    Call [phone] ask for Peds BMT/Hem/Onc fellow on call if complications arise including:    persistent bleeding    fever greater than 100.5    Pain    Lumbar punctures can cause headache. If the pain is not controlled with Tylenol (acetaminophen) please call the Peds BMT/Hem/Onc fellow on call      [facility]  [phone]    Care for Bone Marrow Biopsy    Do not remove bandage/dressing for 24 hours -- after this time they can be removed. If Steri-strips are presents they can stay on until they fall off    No bath, shower or soaking of the dressing for 24 hours    Activity as tolerated by the patient    Diet as able to tolerate    May use Tylenol as needed for pain control    Can apply icepack to the site for discomfort -- no more than 10 minutes at a time    If bleeding presents, apply pressure for 5 minutes    Call [phone] ask for Peds BMT/Hem/Onc fellow on call if complications arise including:     persistent bleeding    fever greater than 100.5    pain       Home Instructions for Your Child after Sedation  Today you received (medicine):  Propofol and Fentanyl  Please keep this form with your health records  You may be more sleepy and irritable today than normal. Wake up every 1 to 11/2 hours during the day to drink.  Also, an adult should stay with you for the rest of the day. The medicine may make you dizzy. Avoid activities that require balance (bike riding, skating, climbing stairs, walking).  Remember:    When you want  to eat again, start with liquids (juice, soda pop, Popsicles). If you feel well enough, you may try a regular diet. It is best to start with light meals for the first 24 hours.    If you have nausea (feels sick to the stomach) or vomiting (throws up), take small amounts of clear liquids (7-Up, Sprite, apple juice or broth). Fluids are more important than food until you are feeling better.    Wait 24 hours before taking medicine that contains alcohol. This includes liquid cold, cough and allergy medicines (Robitussin, Vicks Formula 44 for children, Benadryl, Chlor-Trimeton).    If you will leave your child with a , give the sitter a copy of these instructions.  Call your doctor if:    You have questions about the test results.    You vomit (throws up) more than two times.    You have trouble waking up     If you have trouble breathing, call [phone].  If you have any questions or concerns, please call:  Pediatric Sedation Unit [phone]  Pediatric clinic  [phone]  [facility]  [phone]   Emergency department [phone]  [facility] toll-free number [phone] (Monday--Friday, 8 a.m. to 4:30 p.m.)  I understand these instructions. I have all of my personal belongings.

## 2022-02-09 NOTE — PROCEDURES
BMT Bone Marrow Biopsy and Lumbar Puncture Procedure Note  February 9, 2022 11:51 AM    DIAGNOSIS: ALL     PROCEDURE: Unilateral Bone Marrow Biopsy and Unilateral Aspirate; Diagnostic Lumbar Puncture    SITE: Pediatric Sedation Suite    Patient s identification was positively verified by verbal identification and invasive procedure safety checklist was completed.  Informed consent was obtained. Following the administration of propofol as sedation, patient was placed in the  left lateral decubitus position and prepped and draped in a sterile manner.  Approximately 2 cc of 1% Lidocaine was used over the right posterior iliac spine.  Following this a 3 mm incision was made. Trephine bone marrow core was obtained from the [facility], measuring 8mm in length and satisfactory for evaluation per the special hematology technician's review. Bone marrow aspirates were obtained from the [facility]. Aspirates were sent for morphology, immunophenotyping, cytogenetics, molecular diagnostics RFLP and Myeloid Proliferative panel (NGS) and BCR-ABL minor breakpoint and B-cell gene rearrangement and Clonoseq MRD tracking.  A total of approximately 35 ml of marrow was aspirated. Attention was then turned to the lumbar spine, which had previously been prepped and draped in a sterile manner. A 22 gauge 3.5 inch Candelario needle was inserted in the L3-4 space on the first attempt, and 4.5 mL CSF obtained. Specimen initially appeared pink tinged, which quickly cleared, leaving clear fluid. Following this procedure a sterile dressing was applied to the bone marrow biopsy site, and a sterile bandaid to the LP site. The patient was placed in the supine position to maintain pressure on the biopsy site. Post-procedure wound care instructions were given-- given patient's history of spinal headaches, recommended lying flat for as long as possible post-procedure. The patient tolerated the procedure well with no known discomfort.    Platelet count:  200K  Opening pressure: deferred    Complications: None, though of note the trephine core needle bent due to bone density. Still able to obtain core sample. Also of note, able to obtain CSF samples with Candelario needle (22 gauge, 3.5 inch) on first attempt, however a 2.5 inch needle would likely be adequate and also provide better control over needle manipulation while advancing.    Procedure performed by: SEBAS Menendez, PA-C  Pediatric Blood and Marrow Transplant & Cellular Therapy Program  [facility]  Pager: [phone]  Fax: [phone]

## 2022-02-09 NOTE — OR NURSING
Pt alert, VSS, no c/o pain. Small am't drainage to R hip drsg, LP site dry and intact. Pt ate and drank well. Discharge instructions reviewed with pt . Pt discharged home with dad via w/c to car.

## 2022-02-09 NOTE — ANESTHESIA PREPROCEDURE EVALUATION
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]   Procedure(s):  BIOPSY, BONE MARROW  LUMBAR PUNCTURE, DIAGNOSTIC     LABS:  CBC:   Lab Results   Component Value Date    WBC 5.2 2021    WBC 5.3 2021    HGB 13.7 2021    HGB 13.0 (L) 2021    HCT 40.0 2021    HCT 40.2 2021    PLT 82 (L) 2021     (L) 2021     BMP:   Lab Results   Component Value Date     2021     2021    POTASSIUM 4.4 2021    POTASSIUM 3.5 2021    CHLORIDE 107 2021    CHLORIDE 109 2021    CO2 26 2021    CO2 25 2021    BUN 17 2021    BUN 16 2021    CR 0.88 2021    CR 0.84 2021    GLC 90 2021     (H) 2021     COAGS:   Lab Results   Component Value Date    PTT 52 (H) 2021    INR 1.01 2021    FIBR 578 (H) 2021     POC:   Lab Results   Component Value Date    BGM 98 2020     OTHER:   Lab Results   Component Value Date    ELSA 9.1 2021    PHOS 3.2 2021    MAG 2.3 2021    ALBUMIN 4.1 2021    PROTTOTAL 7.4 2021     (H) 2021    AST  2021      Comment:      Unsatisfactory specimen - hemolyzed     (H) 2020    ALKPHOS 76 2021    BILITOTAL 0.4 2021    TSH 4.20 (H) 2019    T4 0.88 2019    CRP <2.9 2020        Preop Vitals    BP Readings from Last 3 Encounters:   22 114/77   22 112/73   21 101/69    Pulse Readings from Last 3 Encounters:   22 61   22 83   21 66      Resp Readings from Last 3 Encounters:   22 18   22 18   21 18    SpO2 Readings from Last 3 Encounters:   22 97%   22 98%   21 99%      Temp Readings from Last 1 Encounters:  "  02/09/22 36.9  C (98.5  F) (Oral)    Ht Readings from Last 1 Encounters:   02/08/22 1.753 m (5' 9\")      Wt Readings from Last 1 Encounters:   02/09/22 63.1 kg (139 lb 1.8 oz)    Estimated body mass index is 20.54 kg/m  as calculated from the following:    Height as of 2/8/22: 1.753 m (5' 9\").    Weight as of this encounter: 63.1 kg (139 lb 1.8 oz).     LDA:  Port A Cath Single 06/10/20 Right Chest wall (Active)   Site Assessment WDL 02/09/22 1000   Dressing Status clean;dry 02/09/22 1000   Dressing Intervention New dressing 05/26/21 1113   Dressing change due 02/15/21 02/08/21 1201   Line Status Blood return noted;Saline locked 02/09/22 1000   Access Date 02/09/22 02/09/22 1000   Access Attempts 1 02/09/22 1000   Gauge Power noncoring 90 degree bend;20 gauge;3/4 inch 02/09/22 1000   Needle Change Due 06/18/21 05/21/21 1130   Line Necessity Yes, meets criteria 02/09/22 1000   De-Access Date 08/11/21 08/11/21 1550   Date to be Reflushed 06/29/21 06/01/21 1000   Extravasation? No 05/19/21 1200   Number of days: 609        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)      History of blood transfusion      WPW (Aaron-Parkinson-White syndrome) 03/2018      Past Surgical History:   Procedure Laterality Date     BONE MARROW BIOPSY, BONE SPECIMEN, NEEDLE/TROCAR Right 11/27/2018    Procedure: bone marrow biopsy;  Surgeon: Jacqueline Fitzgerald NP;  Location: [facility]      BONE MARROW BIOPSY, BONE SPECIMEN, NEEDLE/TROCAR N/A 2/8/2019    Procedure: BIOPSY BONE MARROW;  Surgeon: Lisa Workman NP;  Location: [facility]      BONE MARROW BIOPSY, BONE SPECIMEN, NEEDLE/TROCAR N/A 5/16/2019    Procedure: BIOPSY, BONE MARROW;  Surgeon: Lilia López APRN CNP;  Location: [facility]     BONE MARROW BIOPSY, BONE SPECIMEN, NEEDLE/TROCAR N/A 11/7/2019    Procedure: Bone marrow biopsy;  Surgeon: Jacqueline Shin NP;  Location:  PEDS SEDATION      BONE MARROW BIOPSY, BONE SPECIMEN, " NEEDLE/TROCAR Right 6/10/2020    Procedure: BIOPSY, BONE MARROW;  Surgeon: Candido Han PA-C;  [facility]: [facility]      BONE MARROW BIOPSY, BONE SPECIMEN, NEEDLE/TROCAR N/A 8/24/2020    Procedure: Bone marrow biopsy;  Surgeon: Jacqueline Shni NP;  [facility]: [facility]      BONE MARROW BIOPSY, BONE SPECIMEN, NEEDLE/TROCAR N/A 9/25/2020    Procedure: BIOPSY, BONE MARROW;  Surgeon: Lilia López APRN CNP;  [facility]: [facility]      BONE MARROW BIOPSY, BONE SPECIMEN, NEEDLE/TROCAR N/A 3/11/2021    Procedure: BIOPSY BONE MARROW;  Surgeon: Lisa Workman NP;  [facility]      BONE MARROW BIOPSY, BONE SPECIMEN, NEEDLE/TROCAR N/A 5/21/2021    Procedure: BIOPSY, BONE MARROW;  Surgeon: Jacqueline Shin NP;  [facility]      BONE MARROW BIOPSY, BONE SPECIMEN, NEEDLE/TROCAR N/A 8/11/2021    Procedure: BIOPSY BONE MARROW;  Surgeon: Lisa Workman NP;  [facility]      ESOPHAGOSCOPY, GASTROSCOPY, DUODENOSCOPY (EGD), COMBINED N/A 2/22/2019    Procedure: Upper endoscopy with biopsy;  Surgeon: Magdalene Hobson MD;  [facility]: [facility]      INSERT CATHETER VASCULAR ACCESS N/A 7/20/2020    Procedure: NON -APHERESIS Double lumen tunneled central burns line placement;  Surgeon: Pato Gómez PA-C;  [facility]      INSERT CATHETER VASCULAR ACCESS N/A 2/8/2021    Procedure: apheresis catheter placement;  Surgeon: Pato Gómez PA-C;  [facility]      INSERT CATHETER VASCULAR ACCESS APHERESIS CHILD N/A 6/10/2020    Procedure: Large Bore Double Lumen NON TUNNELED Apheresis Catheter placement;  Surgeon: Link Rios PA-C;  [facility]      INSERT CATHETER VASCULAR ACCESS DOUBLE LUMEN CHILD N/A 10/26/2018    Procedure: double lumen tunneled line placement;  Surgeon: Rex Valente MD;  [facility]      INSERT PORT VASCULAR ACCESS N/A 6/10/2020    Procedure: Port  placement;  Surgeon: Link Rios PA-C;  [facility]      IR CHEST PORT PLACEMENT > 5 YRS OF AGE  6/10/2020     IR CVC NON TUNNEL LINE REMOVAL  6/12/2020     IR CVC NON TUNNEL PLACEMENT  6/10/2020     IR CVC TUNNEL PLACEMENT > 5 YRS OF AGE  7/20/2020     IR CVC TUNNEL PLACEMENT > 5 YRS OF AGE  2/8/2021     IR CVC TUNNEL REMOVAL LEFT  2/8/2019     IR CVC TUNNEL REMOVAL LEFT  8/24/2020     IR CVC TUNNEL REMOVAL LEFT  4/9/2021     IR PORT REMOVAL LEFT  5/16/2019     O CLAVICLE LEFT Left     fracture with surgical repair and plate/screws     ORTHOPEDIC SURGERY      femur     REMOVE CATHETER VASCULAR ACCESS N/A 2/8/2019    Procedure: Tunneled line removal;  Surgeon: Krystal Esparza MD;  [facility]      REMOVE CATHETER VASCULAR ACCESS N/A 8/24/2020    Procedure: tunneled line removal;  Surgeon: Siri Hernandez PA-C;  [facility]      REMOVE CATHETER VASCULAR ACCESS Left 4/9/2021    Procedure: REMOVAL, VASCULAR ACCESS CATHETER;  Surgeon: Link Rios PA-C;  [facility]: [facility]      REMOVE PORT VASCULAR ACCESS N/A 5/16/2019    Procedure: REMOVAL, VASCULAR ACCESS PORT;  Surgeon: Link Rios PA-C;  [facility]: [facility]      Allergies   Allergen Reactions     Blood Transfusion Related (Informational Only) Other (See Comments)     Patient has a history of a clinically significant antibody against RBC antigens.  A delay in compatible RBCs may occur.Stem cell transplant patient.  Give type O RBCs.  Requires Benadryl and tylenol as premed for platelets and RBCs for hx of hives     Voriconazole Photosensitivity     Significant rash - do not rechallenge        Anesthesia Evaluation    ROS/Med Hx    No history of anesthetic complications  (-) malignant hyperthermia and tuberculosis  Comments: Artemio is scheduled for BM Bx and diagnostic LP    He has had several uncomplicated anesthetics for his AML cares.     Met with Isael and his step mother.  He is NPO and has done well with prior anesthesia.     Cardiovascular Findings   Comments: Stable with WPW syndrome; jan 26, 2021: TTE: Patient after bone marrow transplant. Normal echocardiogram. The left and  right ventricles have normal chamber size, wall thickness, and systolic  function. The calculated biplane left ventricular ejection fraction is 64 %. A  catheter is seen with its tip in the right atrium. No pericardial effusion.  Technically difficult study due to lung artifact.;    EKG on same day: Sinus bradycardia  Aaron-Parkinson-White  Abnormal ECG  When compared with ECG of 12-AUG-2020 11:22,  Aaron-Parkinson-White is again Present    He is stable.     Neuro Findings - negative ROS    Pulmonary Findings   (-) asthma and apnea    HENT Findings - negative HENT ROS    Skin Findings - negative skin ROS      GI/Hepatic/Renal Findings   (-) GERD    Endocrine/Metabolic Findings - negative ROS      Genetic/Syndrome Findings - negative genetics/syndromes ROS    Hematology/Oncology Findings   (+) cancer and hematopoietic stem cell transplant  Comments: Is on treatment for AML     Additional Notes    -- Blood Transfusion Related (Informational Only) -- Other (See Comments)    --  Patient has a history of a clinically significant             antibody against RBC antigens.  A delay in             compatible RBCs may occur.Stem cell transplant             patient.  Give type O RBCs.Requires Benadryl and             tylenol as premed for platelets and RBCs for hx of             hives   -- Voriconazole -- Photosensitivity    --  Significant rash - do not rechallenge      Current Outpatient Medications:  dronabinol (MARINOL) 2.5 MG capsule  LORazepam (ATIVAN) 1 MG tablet  magnesium oxide (MAG-OX) 400 (241.3 Mg) MG tablet  pantoprazole (PROTONIX) 40 MG EC tablet  ruxolitinib (JAKAFI) 20 MG TABS tablet  sulfamethoxazole-trimethoprim (BACTRIM) 400-80 MG tablet  tacrolimus (GENERIC EQUIVALENT) 1 MG capsule  tacrolimus  (GENERIC EQUIVALENT) 1 mg/mL suspension            PHYSICAL EXAM:   Mental Status/Neuro: A/A/O   Airway: Facies: Feasible  Mallampati: I  Mouth/Opening: Full  TM distance: > 6 cm  Neck ROM: Full   Respiratory: Auscultation: CTAB     Resp. Rate: Normal     Resp. Effort: Normal      CV: Rhythm: Regular  Rate: Age appropriate  Heart: Normal Sounds  Edema: None   Comments:      Dental: Normal Dentition                Anesthesia Plan    ASA Status:  2   NPO Status:  NPO Appropriate    Anesthesia Type: General.     - Airway: Native airway   Induction: Intravenous, Propofol.   Maintenance: TIVA.        Consents    Anesthesia Plan(s) and associated risks, benefits, and realistic alternatives discussed. Questions answered and patient/representative(s) expressed understanding.    - Discussed:     - Discussed with:  Patient      - Extended Intubation/Ventilatory Support Discussed: No.      - Patient is DNR/DNI Status: No    Use of blood products discussed: No .     Postoperative Care       PONV prophylaxis: Ondansetron (or other 5HT-3), Background Propofol Infusion     Comments:    Other Comments: GA with native airway, ETT as back up  Standard ASA monitors  All pertinent results and records reviewed, risks, included but not limited to hypoventilation, hypoxemia, laryngo/bronchospasm, N/V d/w parents, patient, all questions, concerns addressed         Jimena Beckman MD

## 2022-02-09 NOTE — ANESTHESIA POSTPROCEDURE EVALUATION
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    Anesthesia Type:  General    Note:  Disposition: Outpatient   Postop Pain Control: Uneventful            Sign Out: Well controlled pain   PONV: No   Neuro/Psych: Uneventful            Sign Out: Acceptable/Baseline neuro status   Airway/Respiratory: Uneventful            Sign Out: Acceptable/Baseline resp. status   CV/Hemodynamics: Uneventful            Sign Out: Acceptable CV status; No obvious hypovolemia; No obvious fluid overload   Other NRE: NONE   DID A NON-ROUTINE EVENT OCCUR?            Last vitals:  Vitals Value Taken Time   BP 95/67 02/09/22 1200   Temp 36.4  C (97.5  F) 02/09/22 1141   Pulse 58 02/09/22 1212   Resp 19 02/09/22 1212   SpO2 99 % 02/09/22 1212   Vitals shown include unvalidated device data.    Electronically Signed By: Jimena Beckman MD  February 9, 2022  12:57 PM

## 2022-02-09 NOTE — ANESTHESIA CARE TRANSFER NOTE
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    Anesthesia Type:   General     Note:    Oropharynx: oropharynx clear of all foreign objects  Level of Consciousness: drowsy  Oxygen Supplementation: nasal cannula  Level of Supplemental Oxygen (L/min / FiO2): 3  Independent Airway: airway patency satisfactory and stable  Dentition: dentition unchanged  Vital Signs Stable: post-procedure vital signs reviewed and stable  Report to RN Given: handoff report given  Patient transferred to:  Recovery    Handoff Report: Identifed the Patient, Identified the Reponsible Provider, Reviewed the pertinent medical history, Discussed the surgical course, Reviewed Intra-OP anesthesia mangement and issues during anesthesia, Set expectations for post-procedure period and Allowed opportunity for questions and acknowledgement of understanding      Vitals:  Vitals Value Taken Time   BP 88/47 02/09/22 1141   Temp 36.5c    Pulse 86 02/09/22 1143   Resp 21 02/09/22 1143   SpO2 99 % 02/09/22 1143   Vitals shown include unvalidated device data.    Electronically Signed By: SNOW Peter CRNA  February 9, 2022  11:44 AM

## 2022-02-10 LAB
APPEARANCE CSF: CLEAR
COLOR CSF: COLORLESS
PATH REPORT.COMMENTS IMP SPEC: NORMAL
PATH REPORT.FINAL DX SPEC: NORMAL
PATH REPORT.GROSS SPEC: NORMAL
PATH REPORT.GROSS SPEC: NORMAL
PATH REPORT.MICROSCOPIC SPEC OTHER STN: NORMAL
PATH REPORT.RELEVANT HX SPEC: NORMAL
PATH REV: ABNORMAL
RBC # CSF MANUAL: 158 /UL (ref 0–2)
TUBE # CSF: 1
WBC # CSF MANUAL: 0 /UL (ref 0–5)

## 2022-02-11 LAB
CULTURE HARVEST COMPLETE DATE: NORMAL
DLCOUNC-%PRED-PRE: 85 %
DLCOUNC-PRE: 27.34 ML/MIN/MMHG
DLCOUNC-PRED: 32.06 ML/MIN/MMHG
ERV-%PRED-PRE: 103 %
ERV-PRE: 2.19 L
ERV-PRED: 2.11 L
EXPTIME-PRE: 4.58 SEC
FEF2575-%PRED-PRE: 122 %
FEF2575-PRE: 5.95 L/SEC
FEF2575-PRED: 4.85 L/SEC
FEFMAX-%PRED-PRE: 117 %
FEFMAX-PRE: 11.67 L/SEC
FEFMAX-PRED: 9.95 L/SEC
FEV1-%PRED-PRE: 113 %
FEV1-PRE: 5.13 L
FEV1FEV6-PRE: 90 %
FEV1FEV6-PRED: 84 %
FEV1FVC-PRE: 90 %
FEV1FVC-PRED: 85 %
FEV1SVC-PRE: 90 %
FEV1SVC-PRED: 80 %
FIFMAX-PRE: 4.73 L/SEC
FRCPLETH-%PRED-PRE: 136 %
FRCPLETH-PRE: 4.42 L
FRCPLETH-PRED: 3.24 L
FVC-%PRED-PRE: 105 %
FVC-PRE: 5.67 L
FVC-PRED: 5.37 L
HTLV I+II AB SER QL IA: NEGATIVE
IC-%PRED-PRE: 97 %
IC-PRE: 3.49 L
IC-PRED: 3.57 L
INTERPRETATION: NORMAL
INTERPRETATION: NORMAL
RVPLETH-%PRED-PRE: 137 %
RVPLETH-PRE: 2.22 L
RVPLETH-PRED: 1.62 L
TLCPLETH-%PRED-PRE: 113 %
TLCPLETH-PRE: 7.9 L
TLCPLETH-PRED: 6.94 L
VA-%PRED-PRE: 117 %
VA-PRE: 7.33 L
VC-%PRED-PRE: 99 %
VC-PRE: 5.68 L
VC-PRED: 5.68 L

## 2022-02-13 LAB
HBV DNA SERPL QL NAA+PROBE: NORMAL
HCV RNA SERPL QL NAA+PROBE: NORMAL
HIV1+2 RNA SERPL QL NAA+PROBE: NORMAL
WNV RNA SERPL DONR QL NAA+PROBE: NORMAL

## 2022-02-16 ENCOUNTER — VIRTUAL VISIT (OUTPATIENT)
Dept: TRANSPLANT | Facility: CLINIC | Age: 22
End: 2022-02-16
Attending: PEDIATRICS
Payer: COMMERCIAL

## 2022-02-16 DIAGNOSIS — C91.01 ACUTE LYMPHOBLASTIC LEUKEMIA (ALL) IN REMISSION (H): Primary | ICD-10-CM

## 2022-02-16 PROCEDURE — 99417 PROLNG OP E/M EACH 15 MIN: CPT | Performed by: PEDIATRICS

## 2022-02-16 PROCEDURE — 99215 OFFICE O/P EST HI 40 MIN: CPT | Mod: 95 | Performed by: PEDIATRICS

## 2022-02-16 NOTE — PROGRESS NOTES
Service Date: 02/16/2022    Dear Children's Care Team:    We had the pleasure of seeing your patient, Artemio Nino, at the [facility] Pediatric BMT clinic for routine post-BMT followup.  As you know, Isael is a 21-year-old male with a history of multiply relapsed ALL.  He is now exactly 1 year status post umbilical cord blood transplant following ablative conditioning per protocol 2013-09.  He presents today via Zoom conference with his mother, stepmother, father and himself through video to discuss recent laboratory visits.    Since I last visited with Isael approximately 6 months ago, he has really had no issues, no nausea, vomiting, diarrhea or constipation, no changes in his skin, no rashes, no fatigue, no dizziness, no stigmata of neurocutaneous diseases, and in fact has been doing well overall with good appetite and good energy level.    REVIEW OF SYSTEMS:  A complete 12-point review of systems is negative.    MEDICATIONS:    1.  Bactrim.  2.  Vitamin D.  3.  Ruxolitinib 20 mg p.o. b.i.d.    LABORATORY RESULTS:  Isael had a pretty extensive workup after this last bone marrow and LP, which I am sure you will receive direct copies of, but essentially all tests are negative, showing no evidence of any sort of relapse.  His spinal fluid had 158 red cells and no white cells.  Flow cytometry was negative for malignancy.  His bone marrow biopsy was slightly hypocellular at 30% cellularity but had no morphologic evidence of malignancy.  He has FISH analysis showed no evidence of JAK2 rearrangement.  He B-cell rearrangement studies showed no evidence of clonality.  His flow cytometry of the marrow was also negative for evidence of malignancy.  Of note, his CD4 absolute number was 139.  His engraftment in his marrow was 100% donor.  His engraftment in CD3 cells was 100% donor, in his engraftment in his peripheral CD33 cells is 100% donor.  His BCR-ABL minor breakpoint PCR analysis showed no copies of  P190.  The only test really outstanding at this point in time is clonalSEQ analysis, often performed for patient's after CAR-T therapy, which is still pending on [facility] and often takes 4-8 weeks to return, but all the other tests, as mentioned above, are negative for any evidence of relapse.    ASSESSMENT AND PLAN:  In summary, Artemio is a 21-year-old male with a history of multiply relapsed acute lymphocytic leukemia now day 365 status post umbilical cord blood transplant per MAC following 2013-09 protocol.  His posttransplant complications included Clostridium difficile.  He has no signs of GVHD currently and is doing quite well.    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 Of note, his initial post CAR-T therapy was complicated by Grade III CRS necessitating Tociluzimab x3, dopamine pressor support, and steroids. No CRS, neurotoxicity or infectious complications from [place] CAR-T. He received preparative regimen per MT2013-09 with Thiotepa, Fludarabine, Busulfan, and ATG and received a UCBT on 2/16/21. He engrafted on day +20. His Day +21 peripheral VNTRs (3/9) and bone marrow chimerism (3/11) reveals 100% donor chimerism and his flow, morphology, and NGS is negative. FISH negative for ETV6 and JAK2.   His subsequent followup at day 180 had also shown 100% engraftment.    RECOMMENDATIONS:    1.  Going forward, Artemio can discontinue the Bactrim as well as discontinue the ruxolitinib, which I believe we were only planning to do for 1 year after transplant.   2.  He may start re-vaccination at your facility.  3.  He may receive live vaccines 1 year from today.  4.  Of note, sperm was collected for fertility preservation.  5.   We will follow up on clonalSEQ when those results become available.  6.  We will discharge Glen from our program, and all future followup can be performed at your institution, where I am sure he will do well.  I recommend a visit there in approximately 2-3 months' time just for a check-in to reestablish care and move forward with any recommendations you might have.     It was a real pleasure to get to know this family and take care of Glen.  Please do not hesitate with questions or concerns.     TOTAL TIME SPENT:  60 minutes, with another 60 minutes reviewing labs, charts, results, talking to colleagues on the same day of the visit.    Vinod Solomon MD        D: 2022   T: 2022   MT: DEB    Name:     GLEN HAMMER  MRN:      [id number]        Account:      [id number]   :      2000           Service Date: 2022       Document: [id number]

## 2022-02-16 NOTE — PROGRESS NOTES
Artemio is a 21 year old who is being evaluated via a billable video visit.      How would you like to obtain your AVS? MyChart  If the video visit is dropped, the invitation should be resent by: Send to e-mail at: [email or web address] Inviting [employer] Business Plan  Will anyone else be joining your video visit? Jessica Montes LPN

## 2022-02-16 NOTE — PATIENT INSTRUCTIONS
Artemio santiago receive follow up care at [facility]. Will forward relevant info and plan to them. No further follow up needed at this time

## 2022-03-11 LAB
Lab: NORMAL
PERFORMING LABORATORY: NORMAL
SPECIMEN STATUS: NORMAL
TEST NAME: NORMAL

## 2022-06-17 ENCOUNTER — TELEPHONE (OUTPATIENT)
Dept: DERMATOLOGY | Facility: CLINIC | Age: 22
End: 2022-06-17

## 2022-06-17 ENCOUNTER — OFFICE VISIT (OUTPATIENT)
Dept: DERMATOLOGY | Facility: CLINIC | Age: 22
End: 2022-06-17
Attending: DERMATOLOGY
Payer: COMMERCIAL

## 2022-06-17 VITALS — BODY MASS INDEX: 19.72 KG/M2 | HEIGHT: 69 IN | WEIGHT: 133.16 LBS

## 2022-06-17 DIAGNOSIS — B07.0 VERRUCA PLANTARIS: ICD-10-CM

## 2022-06-17 DIAGNOSIS — R21 RASH: Primary | ICD-10-CM

## 2022-06-17 DIAGNOSIS — B07.8 COMMON WART: ICD-10-CM

## 2022-06-17 PROCEDURE — 88312 SPECIAL STAINS GROUP 1: CPT | Mod: TC | Performed by: DERMATOLOGY

## 2022-06-17 PROCEDURE — 11104 PUNCH BX SKIN SINGLE LESION: CPT | Performed by: DERMATOLOGY

## 2022-06-17 PROCEDURE — 99214 OFFICE O/P EST MOD 30 MIN: CPT | Mod: 25 | Performed by: DERMATOLOGY

## 2022-06-17 PROCEDURE — G0463 HOSPITAL OUTPT CLINIC VISIT: HCPCS

## 2022-06-17 RX ORDER — FLUOROURACIL 50 MG/G
CREAM TOPICAL 2 TIMES DAILY
Qty: 40 G | Refills: 4 | Status: CANCELLED | OUTPATIENT
Start: 2022-06-17

## 2022-06-17 RX ORDER — TRIAMCINOLONE ACETONIDE 0.25 MG/G
OINTMENT TOPICAL 2 TIMES DAILY
Qty: 454 G | Refills: 3 | Status: SHIPPED | OUTPATIENT
Start: 2022-06-17

## 2022-06-17 ASSESSMENT — PAIN SCALES - GENERAL: PAINLEVEL: MODERATE PAIN (4)

## 2022-06-17 NOTE — TELEPHONE ENCOUNTER
[facility] Call Center    Phone Message    May a detailed message be left on voicemail: yes     Reason for Call: Appointment Intake      Patient was seen today, was suppose to schedule 10 days follow up from biopsy. Call center is unable to find anything until August. Sending encounter to be review and call mom back to schedule.     Action Taken: Other: Peds Derm    Travel Screening: Not Applicable

## 2022-06-17 NOTE — TELEPHONE ENCOUNTER
[facility] Call Center    Phone Message    May a detailed message be left on voicemail: yes     Reason for Call: Medication Question or concern regarding medication   Prescription Clarification  Name of Medication: triamcinolone and CMPD RX  Prescribing Provider: Nubia  Pharmacy: [facility]   What on the order needs clarification? Pharmacy called stating they need dosage clarifications on both and how long they are supposed to last. They would like a callback.    Action Taken: Message routed to:  Other: peds derm    Travel Screening: Not Applicable

## 2022-06-17 NOTE — PATIENT INSTRUCTIONS
[facility]- Pediatric Dermatology  Dr. Laura Schaffer, Dr. Abdirizak Delacruz, Dr. Jazmín Wang, Dr. Omayra Dominguez, HANNAH Gallo Dr., Dr. Wanda Bowden    Non Urgent  Nurse Triage Line; [phone]- Nica and Harriet VILLAGRAN Care Coordinators    [place] (/Complex ) [phone]    If you need a prescription refill, please contact your pharmacy. Refills are approved or denied by our Physicians during normal business hours, Monday through Fridays  Per office policy, refills will not be granted if you have not been seen within the past year (or sooner depending on your child's condition)      Scheduling Information:   Pediatric Appointment Scheduling and Call Center [phone]   Radiology Scheduling- [phone]   Sedation Unit Scheduling- [phone]  [facility] Scheduling- [facility] [phone]; Pediatric Dermatology Clinic [phone]  Main  Services: [phone]   Japanese: [phone]   Polish: [phone]   Hmong/Malagasy/Jm: [phone]    Preadmission Nursing Department Fax Number: [phone] (Fax all pre-operative paperwork to this number)      For urgent matters arising during evenings, weekends, or holidays that cannot wait for normal business hours please call [phone] and ask for the Dermatology Resident On-Call to be paged.     We will do a biopsy today.  - triamcinolone 0.025% and Vaseline head to toe  - bleach baths daily

## 2022-06-17 NOTE — PROGRESS NOTES
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 )      HPI:  Artemio Nino is a(n) 22 year old male with history of BMT November 2018 and 2020 who presents today as a return patient for new rash.     Presents with 2 months of whole body rash that has worsened in the last 3 weeks.  - No known trigger. No recent illness.  - Locations: scalp, face, axillary, extremities, groin  - Very pruritic, no burning, no pain.  - Pink eczematous plaques.  - No sick contacts.  - Treated with triamcinolone BID with minimal relief.    He would like Efudex reordered for his plantar warts.He did not  the prescription due to cost, but he is now feeling motivated to treat his warts.    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     Examination of participant or  control in clinical research     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)      History of blood transfusion      WPW (Aaron-Parkinson-White syndrome) 03/2018     Past Surgical History:   Procedure Laterality Date     BONE MARROW BIOPSY, BONE SPECIMEN, NEEDLE/TROCAR Right 11/27/2018     BONE MARROW BIOPSY, BONE SPECIMEN, NEEDLE/TROCAR N/A 2/8/2019     BONE MARROW BIOPSY, BONE SPECIMEN, NEEDLE/TROCAR N/A 5/16/2019     BONE MARROW BIOPSY, BONE SPECIMEN, NEEDLE/TROCAR N/A 11/7/2019     BONE MARROW BIOPSY, BONE SPECIMEN, NEEDLE/TROCAR Right 6/10/2020     BONE MARROW BIOPSY, BONE SPECIMEN, NEEDLE/TROCAR N/A 8/24/2020     BONE MARROW BIOPSY, BONE SPECIMEN, NEEDLE/TROCAR N/A 9/25/2020     BONE MARROW BIOPSY, BONE SPECIMEN, NEEDLE/TROCAR N/A 3/11/2021     BONE MARROW BIOPSY, BONE SPECIMEN, NEEDLE/TROCAR N/A 5/21/2021     BONE MARROW BIOPSY, BONE SPECIMEN, NEEDLE/TROCAR N/A 8/11/2021     BONE MARROW BIOPSY, BONE SPECIMEN, NEEDLE/TROCAR Right 2/9/2022     ESOPHAGOSCOPY, GASTROSCOPY, DUODENOSCOPY (EGD), COMBINED N/A 2/22/2019     INSERT CATHETER VASCULAR ACCESS N/A 7/20/2020     INSERT CATHETER VASCULAR ACCESS N/A 2/8/2021     INSERT CATHETER VASCULAR ACCESS APHERESIS CHILD N/A 6/10/2020     INSERT CATHETER VASCULAR ACCESS DOUBLE LUMEN CHILD N/A 10/26/2018     INSERT PORT VASCULAR ACCESS N/A 6/10/2020     IR CHEST PORT PLACEMENT > 5 YRS OF AGE  6/10/2020     IR CVC NON TUNNEL LINE REMOVAL  6/12/2020     IR CVC NON TUNNEL PLACEMENT  6/10/2020     IR CVC TUNNEL PLACEMENT > 5 YRS OF AGE  7/20/2020     IR CVC TUNNEL PLACEMENT > 5 YRS OF AGE  2/8/2021     IR CVC TUNNEL REMOVAL LEFT  2/8/2019     IR CVC TUNNEL REMOVAL LEFT  8/24/2020     IR CVC TUNNEL REMOVAL LEFT  4/9/2021     IR PORT REMOVAL LEFT  5/16/2019     O CLAVICLE LEFT Left      ORTHOPEDIC SURGERY       REMOVE CATHETER VASCULAR ACCESS N/A 2/8/2019     REMOVE CATHETER VASCULAR ACCESS N/A 8/24/2020     REMOVE CATHETER  "VASCULAR ACCESS Left 4/9/2021     REMOVE PORT VASCULAR ACCESS N/A 5/16/2019       Medications:  Current Outpatient Medications   Medication     Cholecalciferol (VITAMIN D3) 50 MCG (2000 UT) CAPS     COMPOUNDED NON-CONTROLLED SUBSTANCE (CMPD RX) - PHARMACY TO MIX COMPOUNDED MEDICATION     ruxolitinib (JAKAFI) 20 MG TABS tablet     sulfamethoxazole-trimethoprim (BACTRIM) 400-80 MG tablet     tadalafil (CIALIS) 5 MG tablet     Vitamin D3 (CHOLECALCIFEROL) 25 mcg (1000 units) tablet     No current facility-administered medications for this visit.     Labs/Imaging:  None reviewed.    Physical Exam:  Vitals: Ht 5' 8.9\" (175 cm)   Wt 60.4 kg (133 lb 2.5 oz)   BMI 19.72 kg/m    SKIN: Full skin, which includes the head/face, both arms, chest, back, abdomen,both legs, genitalia and/or groin buttocks, digits and/or nails, was examined.  - Thin pink oval, moderately well-demarcated, scaly plaques on the scalp, anterior/posterior neck, upper eyelids, forehead, trunk, arms, legs, dorsal hands, dorsal feet. Palms and soles are spared.   - Verrucous plaques with pinpoint black dots on the distal bilateral plantar feet.  - No other lesions of concern on areas examined.                          Assessment & Plan:    # Eczematous rash  New progressive rash of unclear etiology. Rash could be eczematous or psoriasiform. Differential includes atopic dermatitis, allergic contact dermatitis, psoriasis, pityriasis rosea. We will do a biopsy today and start soak and smears. We will reassess pending biopsy results.  - start triamcinolone 0.025% ointment, soak and smear with Vaseline  - see punch biopsy procedure note below  - start daily bleach baths  - photodocumentation today    # Plantar warts, bilateral. Large but not painful  Stable plantar warts since last visit. We will re-prescribe the Efudex today. Counseled to wash hands well after application and to avoid pets licking feet after application  - Start 5% fluorouracil/70% salicylic " acid cream nightly. Sent this prescription.    Procedures: - Punch biopsy procedure note: After discussion with patient or guardian on benefits and risks including but not limited to, bleeding, infection, scar, incomplete removal, recurrence, and non-diagnostic biopsy, written consent and photographs were obtained. The area was cleaned with isopropyl alcohol. 0.5 mL of 1% lidocaine with epinephrine was injected to obtain adequate anesthesia of 1 lesion(s) on the left shoulder. 4 mm punch biopsy performed at site(s). 4-0 Prolene sutures were utilized to approximate the epidermal edges. White petrolatum ointment and a bandage was applied to the wound. Explicit verbal and written wound care instructions were provided. The patient left the dermatology clinic in good condition.    Follow-up: prn for new or changing lesions    CC No referring provider defined for this encounter. on close of this encounter.    Staff and Medical Student:     Bushra BASS, MS4, saw and staffed the patient with Abdirizak Delacruz MD.  [employer]    I was present with the medical student who participated in the service and in the documentation of the note.  I have verified the history and personally performed the physical exam and medical decision making.  I agree with the assessment and plan of care as documented in the note. I performed the skin biopsy documented above.  Abdirizak Delacruz MD

## 2022-06-17 NOTE — PROGRESS NOTES
Pause for the cause has been completed prior to 4mm punch biopsy.   1. Artemio was identified by both name and date of birth -  YES.   2. The correct site was identified -  YES.   3. Site marked by provider - YES.   4. Written informed consent correct and signed or verbal authorization  to proceed is obtained -  YES.   5. Verify necessary supplies, equipment, and diagnostics are available -  YES.   6. Time out is performed immediately prior to procedure -  YES.

## 2022-06-17 NOTE — NURSING NOTE
[id number]  Chief Complaint   Patient presents with     RECHECK     Acute rash.      Initial Ht 5' 8.9\" (175 cm)   Wt 133 lb 2.5 oz (60.4 kg)   BMI 19.72 kg/m   Estimated body mass index is 19.72 kg/m  as calculated from the following:    Height as of this encounter: 5' 8.9\" (175 cm).    Weight as of this encounter: 133 lb 2.5 oz (60.4 kg).  Medication Reconciliation: complete      Lilia Huerta, EMT       "

## 2022-06-17 NOTE — LETTER
6/17/2022      RE: Artemio Nino  [address] [facility] [facility] [place] [address]     Dear Colleague,    Thank you for the opportunity to participate in the care of your patient, Artemio Nino, at the [facility] at [facility]. Please see a copy of my visit note below.    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 )      HPI:  Artemio Nino is a(n) 22 year old male with history of BMT November 2018 and 2020 who presents today as a return patient for new rash.     Presents with 2 months of whole body rash that has worsened in the last 3 weeks.  - No known trigger. No recent illness.  - Locations: scalp, face, axillary, extremities, groin  - Very pruritic, no burning, no pain.  - Pink eczematous plaques.  - No sick contacts.  - Treated with triamcinolone BID with minimal relief.    He would like Efudex reordered for his plantar warts.He did not  the prescription due to cost, but he is now feeling motivated to treat his warts.    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     Examination of participant or control in clinical research     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)      History of blood transfusion      WPW (Aaron-Parkinson-White syndrome) 03/2018     Past Surgical History:   Procedure Laterality Date     BONE MARROW BIOPSY, BONE SPECIMEN, NEEDLE/TROCAR Right 11/27/2018     BONE MARROW BIOPSY, BONE SPECIMEN, NEEDLE/TROCAR N/A 2/8/2019     BONE MARROW BIOPSY, BONE SPECIMEN, NEEDLE/TROCAR N/A 5/16/2019     BONE MARROW BIOPSY, BONE SPECIMEN, NEEDLE/TROCAR N/A 11/7/2019     BONE MARROW BIOPSY, BONE SPECIMEN, NEEDLE/TROCAR Right 6/10/2020     BONE MARROW BIOPSY, BONE SPECIMEN, NEEDLE/TROCAR N/A 8/24/2020     BONE MARROW BIOPSY, BONE SPECIMEN, NEEDLE/TROCAR N/A 9/25/2020     BONE MARROW BIOPSY, BONE SPECIMEN, NEEDLE/TROCAR N/A 3/11/2021     BONE MARROW BIOPSY, BONE SPECIMEN, NEEDLE/TROCAR N/A 5/21/2021     BONE MARROW BIOPSY, BONE SPECIMEN, NEEDLE/TROCAR N/A 8/11/2021     BONE MARROW BIOPSY, BONE SPECIMEN, NEEDLE/TROCAR Right 2/9/2022     ESOPHAGOSCOPY, GASTROSCOPY, DUODENOSCOPY (EGD), COMBINED N/A 2/22/2019     INSERT CATHETER VASCULAR ACCESS N/A 7/20/2020     INSERT CATHETER VASCULAR ACCESS N/A 2/8/2021     INSERT CATHETER VASCULAR ACCESS APHERESIS CHILD N/A 6/10/2020     INSERT CATHETER VASCULAR ACCESS DOUBLE LUMEN CHILD N/A 10/26/2018     INSERT PORT VASCULAR ACCESS N/A 6/10/2020     IR CHEST PORT PLACEMENT > 5 YRS OF AGE  6/10/2020     IR CVC NON TUNNEL LINE REMOVAL  6/12/2020     IR CVC NON TUNNEL PLACEMENT  6/10/2020     IR CVC TUNNEL PLACEMENT > 5 YRS OF AGE  7/20/2020     IR CVC TUNNEL  "PLACEMENT > 5 YRS OF AGE  2/8/2021     IR CVC TUNNEL REMOVAL LEFT  2/8/2019     IR CVC TUNNEL REMOVAL LEFT  8/24/2020     IR CVC TUNNEL REMOVAL LEFT  4/9/2021     IR PORT REMOVAL LEFT  5/16/2019     O CLAVICLE LEFT Left      ORTHOPEDIC SURGERY       REMOVE CATHETER VASCULAR ACCESS N/A 2/8/2019     REMOVE CATHETER VASCULAR ACCESS N/A 8/24/2020     REMOVE CATHETER VASCULAR ACCESS Left 4/9/2021     REMOVE PORT VASCULAR ACCESS N/A 5/16/2019       Medications:  Current Outpatient Medications   Medication     Cholecalciferol (VITAMIN D3) 50 MCG (2000 UT) CAPS     COMPOUNDED NON-CONTROLLED SUBSTANCE (CMPD RX) - PHARMACY TO MIX COMPOUNDED MEDICATION     ruxolitinib (JAKAFI) 20 MG TABS tablet     sulfamethoxazole-trimethoprim (BACTRIM) 400-80 MG tablet     tadalafil (CIALIS) 5 MG tablet     Vitamin D3 (CHOLECALCIFEROL) 25 mcg (1000 units) tablet     No current facility-administered medications for this visit.     Labs/Imaging:  None reviewed.    Physical Exam:  Vitals: Ht 5' 8.9\" (175 cm)   Wt 60.4 kg (133 lb 2.5 oz)   BMI 19.72 kg/m    SKIN: Full skin, which includes the head/face, both arms, chest, back, abdomen,both legs, genitalia and/or groin buttocks, digits and/or nails, was examined.  - Thin pink oval, moderately well-demarcated, scaly plaques on the scalp, anterior/posterior neck, upper eyelids, forehead, trunk, arms, legs, dorsal hands, dorsal feet. Palms and soles are spared.   - Verrucous plaques with pinpoint black dots on the distal bilateral plantar feet.  - No other lesions of concern on areas examined.                          Assessment & Plan:    # Eczematous rash  New progressive rash of unclear etiology. Rash could be eczematous or psoriasiform. Differential includes atopic dermatitis, allergic contact dermatitis, psoriasis, pityriasis rosea. We will do a biopsy today and start soak and smears. We will reassess pending biopsy results.  - start triamcinolone 0.025% ointment, soak and smear with " Vaseline  - see punch biopsy procedure note below  - start daily bleach baths  - photodocumentation today    # Plantar warts, bilateral. Large but not painful  Stable plantar warts since last visit. We will re-prescribe the Efudex today. Counseled to wash hands well after application and to avoid pets licking feet after application  - Start 5% fluorouracil/70% salicylic acid cream nightly. Sent this prescription.    Procedures: - Punch biopsy procedure note: After discussion with patient or guardian on benefits and risks including but not limited to, bleeding, infection, scar, incomplete removal, recurrence, and non-diagnostic biopsy, written consent and photographs were obtained. The area was cleaned with isopropyl alcohol. 0.5 mL of 1% lidocaine with epinephrine was injected to obtain adequate anesthesia of 1 lesion(s) on the left shoulder. 4 mm punch biopsy performed at site(s). 4-0 Prolene sutures were utilized to approximate the epidermal edges. White petrolatum ointment and a bandage was applied to the wound. Explicit verbal and written wound care instructions were provided. The patient left the dermatology clinic in good condition.    Follow-up: prn for new or changing lesions    CC No referring provider defined for this encounter. on close of this encounter.    Staff and Medical Student:     IBushra, MS4, saw and staffed the patient with Abdirizak Delacruz MD.  [employer]    I was present with the medical student who participated in the service and in the documentation of the note.  I have verified the history and personally performed the physical exam and medical decision making.  I agree with the assessment and plan of care as documented in the note. I performed the skin biopsy documented above.  Abdirizak Delacruz MD

## 2022-06-20 NOTE — TELEPHONE ENCOUNTER
RN contacted pharmacy. RN left detailed message on pharmacy presriber line. RN explained the triamcinolone 0.0.25% ointment should be applied twice daily to all red, rough areas on body up to half the days of the month. RN also advised in message the 454 grams at this time was a 30 day supply due to extent of application. RN advised the compounded salicylic acid with efudex the 45 grams was a 30 day supply and would be applied daily to bilateral feet. RN provided nurse triage phone number for reference in case there were further questions or concerns.

## 2022-06-21 LAB
PATH REPORT.COMMENTS IMP SPEC: NORMAL
PATH REPORT.FINAL DX SPEC: NORMAL
PATH REPORT.GROSS SPEC: NORMAL
PATH REPORT.MICROSCOPIC SPEC OTHER STN: NORMAL
PATH REPORT.RELEVANT HX SPEC: NORMAL

## 2022-06-21 PROCEDURE — 88305 TISSUE EXAM BY PATHOLOGIST: CPT | Mod: 26 | Performed by: PATHOLOGY

## 2022-06-21 PROCEDURE — 88312 SPECIAL STAINS GROUP 1: CPT | Mod: 26 | Performed by: PATHOLOGY

## 2022-06-21 NOTE — TELEPHONE ENCOUNTER
Called patient, no answer, left message with direct number. Called mom-Mervat and notified her follow up instructions are to follow up as needed, biopsy results are still in progress. Notified mom 10 day would be for suture removal. Mom confident in removing stitches herself. Provided direct number in case Derm is needed.

## 2022-08-01 NOTE — PLAN OF CARE
Afeb.  OVSS. Lungs clear.  Trying to eat this afternoon, benadryl x1 to help.  Taking oral medications well.   Port accessed without issue, blood culture drawn.  Hourly rounding done. POC followed.   Show Surgical Defect Variables In The Stage Tabs: Yes

## 2022-11-01 ENCOUNTER — TELEPHONE (OUTPATIENT)
Dept: TRANSPLANT | Facility: CLINIC | Age: 22
End: 2022-11-01

## 2022-11-01 NOTE — TELEPHONE ENCOUNTER
----- Message from Sherice Richards, RN sent at 11/1/2022 10:21 AM CDT -----  Regarding: Feb BAN 2 year  Herb Cowan,     Finally getting ahead and sending maternity leave BANs. :)     Isael will come back in Feb for 2 year BAN for the following:    Dr Solomon  Echo  EKG in [facility]  Bone marrow biopsy  LP  Labs in sedation    THanks!  Sherice

## 2022-11-20 ENCOUNTER — HEALTH MAINTENANCE LETTER (OUTPATIENT)
Age: 22
End: 2022-11-20

## 2023-02-08 NOTE — PROGRESS NOTES
Pediatric BMT Daily Progress Note    Interval Events: Artemio remains stable.  He completes post-transplant Cytoxan flush today at noon.  Seemed to be interested in receiving extra lasix yesterday even when UOP adequate.  He is reporting loose stools overnight.  Appetite is poor.       Review of Systems: Pertinent positives include those mentioned in interval events. A complete review of systems was performed and is otherwise negative.      Medications:  Please see MAR    Physical Exam:  Temp:  [97.7  F (36.5  C)-99.3  F (37.4  C)] 97.8  F (36.6  C)  Pulse:  [63-89] 89  Heart Rate:  [57-70] 57  Resp:  [19-23] 23  BP: ()/(58-81) 101/68  SpO2:  [95 %-99 %] 96 %  I/O last 3 completed shifts:  In: 5203.2 [I.V.:5187.2; IV Piggyback:16]  Out: 4990 [Urine:4740; Emesis/NG output:200; Stool:50]  GEN: Sleeping, wakes for exam, no distress   HEENT: Normocephalic, atraumatic, alopecia, sclera anicteric, non-injected, mucous membranes slightly dry, nares patent without discharge    CARD: Heart regular rate and rhythm. No murmurs, rubs or gallops.  Cap refill 2 seconds.  RESP: Lungs clear to auscultation. No increased work of breathing, crackles or wheezes.   ABD: Non-distended, non-tender, bowel sounds hypoactive  EXTREM: No edema noted  SKIN: No rashes noted     Labs:  Labs reviewed, pertinent findings BMP with BUN 6, Cr 0.55.  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 He is currently BMT Transplant Date: BMT Txp 11/2/2018 (4 days).  He is doing well clinically at this time with some persistent dry mouth.  His nausea is stable.      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  Conditioning per 2015-29: TBI days -4 through -1 followed by matched sibling donor transplant on 11/2  Render Risk Assessment In Note?: no   - awaiting count recovery       # Risk for GVHD: Post-transplant Cytoxan on days +3 and +4 complete   - Tacrolimus and MMF starts today       FEN/Renal:  # Risk for malnutrition: no history of TPN or enteral nutrition dependence   - Dietician following   - Regular diet as tolerated   - Start TPN/lipids this evening       # Risk for electrolyte abnormalities:  - check daily electrolytes. Given cardiac history, keep K>3.5, Mg>1.8, iCa nml.   - Mag and K sliding scale       # Risk for renal dysfunction and fluid overload:  - monitor I/O's and daily weights       # Risk for aHUS/TA-TMA:    - monitor LDH qMonday  - monitor urine protein/creatinine qTuesday      Pulmonary:  # Risk for pulmonary toxicity secondary to chemotherapy:   - monitor respiratory function per unit routine      # Rhinorrhea: 3 mos history, mild. Sinus CT (10/22) with findings of mucosal thickening without evidence of active infection  - RVP 10/22 resulted negative      # Pneumomediastinum: finding upon work up Chest CT (10/22), asymptomatic. Per radiology, likely benign, perhaps transient.   - Patient instructed to notify care team with any SOB, dyspnea, or remarkable cough      Cardiovascular:  # Aaron-Parkinson-White Syndrome: diagnosed upon syncope in 02/2018. Asymptomatic, no interventions to date. Work up EKG c/w WPW, sinus bradycardia at 49 bpm. Work up ECHO normal with EF 68%.   Cardiology visit with Dr. Plummer (10/22): Holter revealed 10 PACs with no evidence of AV block. Normal Echocardiogram with EF 68%. Isael has hx of HR in high 30s with sleep.  - Avoid catecholinergics as able to avoid tachycardia  - Maintain normal iCa+, mag > 1.8, K > 3.5      Heme:   # Pancytopenia secondary to chemotherapy: hx of one platelet transfusion, well tolerated  - Transfuse for hemoglobin < 8, platelets < 30,000 (Defibrotide)  - No previous premedications, monitor for need  - GCSF to start day +5 until ANC >1500 x3     Infectious Disease:  # Risk for  Comment: Pre-op antibiotic prophylaxis prior to Mohs surgery due to hip replacement <2 years ago Detail Level: Simple infection given immunocompromised status:   Active: now afebrile, but due to fever during conditioning, will continue Cefepime through engraftment  - monitor blood cultures-no growth to date  - CVL insertion site with purulent drainage but no erythema--sent wound cx 11/5 NGTD      ID history: 3 mos hx of mild rhinorrhea. Chest/sinus CT without evidence of infection. 10/22 RVP negative.      Prophylaxis:                                                                                                                        - viral prophylaxis: CMV neg (donor CMV positive), HSV positive. Acyclovir  - fungal prophylaxis: high risk- Micafungin    - bacterial prophylaxis: Cefepime through engraftment (had fever during conditioning)      Past infections: none significant       GI:   # Nausea management:   - Scheduled medications: Kytril Q12H   - PRN medications: Ativan, Benadryl, marinol, Phenergan (changed to PRN due to xerostomia)  - avoiding scop patch for now due to xerostomia      # Risk for VOD: especially high risk given previous Inotuzumab therapy.   - Ursodiol TID (10 mg/kg)  - Defibrotide ppx      ENT:  #epistaxis: very mild, nasal mucosa dry  - Ayr gel PRN  - Vaseline to nares     Neuro:  # Mucositis/pain (anticipated):   - Morphine 2-4 mg Q 3H PRN  - Integrative therapy       The above plan of care was developed by and communicated to me by the   Pediatric BMT attending physician, Dr. Viky Zavala.       Jazmín Cabezas MD  Pediatric BMT Hospitalist        BMT Attending Note:     Artemio was seen and evaluated by me today.     The significant interval history includes doing well.  Last day of flush today.  Received 3 doses of lasix yesterday.  Lots of stool overnight.       I have reviewed changes and data from the last 24 hours, including medications, laboratory results, vital signs, radiograph results, consultant recommendations and pathology results.      I have formulated and discussed the plan  with the BMT team. I discussed the course and plan with the patient/family and answered all of their questions to the best of my ability. I counseled them regarding the followin yo with VHR B cell ALL here for matched sibling transplant with TBI and post transplant cytoxan, at risk for nausea and vomiting- kytril scheduled and continue phenergan and ativan PRN, at risk for VOD- defibrotide and ursodiol, at risk for malnutrition- will follow closely- on IVF, at risk of opportunistic infection- now afebrile on cefepime and  prophylaxis acyclovir and hannah, history of WPW- keep electrolytes normal and avoid catecholinergics.  Pain- morphine PRN.   We will plan on starting TPN today due to decreased PO intake. H/O expistaxis- on saline.   Start tacro/MMF today.       My care coordination activities today include oversight of planned lab studies, oversight of planned radiographic studies, oversight of medication changes, discussion with BMT team-members and discussion with consultants.     My total floor time today was at least 35 minutes, greater than 50% of which was counseling and coordination of care.        Viky Zavala MD, PhD    Pediatric Blood and Marrow Transplant  [facility]    Patient Active Problem List   Diagnosis     Acute lymphoblastic leukemia (ALL) in remission (H)     Aaron-Parkinson-White (WPW) syndrome     Bone marrow transplant candidate     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)     At risk for infection                  No

## 2023-02-09 ENCOUNTER — MEDICAL CORRESPONDENCE (OUTPATIENT)
Dept: TRANSPLANT | Facility: CLINIC | Age: 23
End: 2023-02-09
Payer: COMMERCIAL

## 2023-02-13 ENCOUNTER — TELEPHONE (OUTPATIENT)
Dept: TRANSPLANT | Facility: CLINIC | Age: 23
End: 2023-02-13
Payer: COMMERCIAL

## 2023-02-13 NOTE — TELEPHONE ENCOUNTER
Called and left a voicemail with Artemio to discuss changes in his schedule regarding his appointment with Dr. Solomon on 2/22. I informed him that he needed to get an H&P prior to receiving sedation for his procedures on 2/22 as well.

## 2023-02-20 ENCOUNTER — TELEPHONE (OUTPATIENT)
Dept: TRANSPLANT | Facility: CLINIC | Age: 23
End: 2023-02-20
Payer: COMMERCIAL

## 2023-02-20 ENCOUNTER — ONCOLOGY VISIT (OUTPATIENT)
Dept: TRANSPLANT | Facility: CLINIC | Age: 23
End: 2023-02-20
Attending: PHYSICIAN ASSISTANT
Payer: COMMERCIAL

## 2023-02-20 VITALS
RESPIRATION RATE: 18 BRPM | DIASTOLIC BLOOD PRESSURE: 75 MMHG | SYSTOLIC BLOOD PRESSURE: 121 MMHG | HEIGHT: 69 IN | WEIGHT: 137.13 LBS | HEART RATE: 81 BPM | TEMPERATURE: 98.3 F | OXYGEN SATURATION: 96 % | BODY MASS INDEX: 20.31 KG/M2

## 2023-02-20 DIAGNOSIS — C91.01 ACUTE LYMPHOBLASTIC LEUKEMIA (ALL) IN REMISSION (H): Primary | ICD-10-CM

## 2023-02-20 PROCEDURE — 99215 OFFICE O/P EST HI 40 MIN: CPT | Performed by: PHYSICIAN ASSISTANT

## 2023-02-20 PROCEDURE — G0463 HOSPITAL OUTPT CLINIC VISIT: HCPCS

## 2023-02-20 PROCEDURE — 99417 PROLNG OP E/M EACH 15 MIN: CPT | Performed by: PHYSICIAN ASSISTANT

## 2023-02-20 ASSESSMENT — PAIN SCALES - GENERAL: PAINLEVEL: NO PAIN (0)

## 2023-02-20 NOTE — NURSING NOTE
"Chief Complaint   Patient presents with     RECHECK     Pt here for ALL s/p BMT follow-up, H&P     /75 (BP Location: Right arm, Patient Position: Sitting, Cuff Size: Adult Regular)   Pulse 81   Temp 98.3  F (36.8  C) (Oral)   Resp 18   Ht 1.751 m (5' 8.94\")   Wt 62.2 kg (137 lb 2 oz)   SpO2 96%   BMI 20.29 kg/m      No Pain (0)  Data Unavailable    I have reviewed the patients medication and allergy list.    Patient needs refills: no    Dressing change needed? No    EKG needed? No    Chai Nevarez, EMT  February 20, 2023  "

## 2023-02-20 NOTE — H&P (VIEW-ONLY)
"Pediatric Bone Marrow Transplant History and Physical  [facility]     History of Present Illness  Isael is a 21 year old male with a history of multiply relapsed B-cell ALL now 2 year s/p his second bone marrow transplant ([facility] source). He presents to clinic today for a History and Physical prior to his 2 year anniversary BMBx later this week.    Isael reports that he has been doing very well recently, and feels he continues to get \"better and better\". He is not following with any medical teams regularly. He was last seen at [facility] in June 2022. He was also seen by dermatology in the past year for a erythematous macular rash, that resolved with application of Kenalog ointment and has remained well-controlled with frequent emollient application. He otherwise has not required medical attention. He is no longer taking medications, and denies any recent infections. He experienced COVID-19 approximately 1 year ago. No additional COVID vaccines since his initial 2 dose series, did not receive seasonal influenza vaccine this year. Hx of WPW, has not required regular cardiology follow-up for monitoring.    Today he reports that he is feeling well without recent fever, cough, congestion, rhinorrhea, odynophagia, GI dysregulation, headache, vision/hearing changes, rashes, joint stiffness or pain, weight loss, or difficulties with mobility or energy. He does endorse dry skin, as noted above, which is baseline for him.    ROS: A complete review of systems is negative except as noted in HPI    Past Medical History  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)      History of blood transfusion      WPW (Aaron-Parkinson-White syndrome) 03/2018       Past Surgical History  Past Surgical History:   Procedure Laterality Date     BONE MARROW BIOPSY, BONE SPECIMEN, NEEDLE/TROCAR Right 11/27/2018    Procedure: bone marrow biopsy;  Surgeon: Lia, " Jacqueline PLAZA NP;  [facility]      BONE MARROW BIOPSY, BONE SPECIMEN, NEEDLE/TROCAR N/A 2/8/2019    Procedure: BIOPSY BONE MARROW;  Surgeon: Lisa Workman NP;  [facility]      BONE MARROW BIOPSY, BONE SPECIMEN, NEEDLE/TROCAR N/A 5/16/2019    Procedure: BIOPSY, BONE MARROW;  Surgeon: Lilia López, SNOW CNP;  [facility]     BONE MARROW BIOPSY, BONE SPECIMEN, NEEDLE/TROCAR N/A 11/7/2019    Procedure: Bone marrow biopsy;  Surgeon: Jacqueline Shin NP;  [facility]      BONE MARROW BIOPSY, BONE SPECIMEN, NEEDLE/TROCAR Right 6/10/2020    Procedure: BIOPSY, BONE MARROW;  Surgeon: Candido Han PA-C;  [facility]      BONE MARROW BIOPSY, BONE SPECIMEN, NEEDLE/TROCAR N/A 8/24/2020    Procedure: Bone marrow biopsy;  Surgeon: Jacqueline Shin NP;  [facility]      BONE MARROW BIOPSY, BONE SPECIMEN, NEEDLE/TROCAR N/A 9/25/2020    Procedure: BIOPSY, BONE MARROW;  Surgeon: Lilia López, SNOW CNP;  [facility]      BONE MARROW BIOPSY, BONE SPECIMEN, NEEDLE/TROCAR N/A 3/11/2021    Procedure: BIOPSY BONE MARROW;  Surgeon: Lisa Workman NP;  [facility]      BONE MARROW BIOPSY, BONE SPECIMEN, NEEDLE/TROCAR N/A 5/21/2021    Procedure: BIOPSY, BONE MARROW;  Surgeon: Jacqueline Shin NP;  [facility]      BONE MARROW BIOPSY, BONE SPECIMEN, NEEDLE/TROCAR N/A 8/11/2021    Procedure: BIOPSY BONE MARROW;  Surgeon: Lisa Workman NP;  [facility]      BONE MARROW BIOPSY, BONE SPECIMEN, NEEDLE/TROCAR Right 2/9/2022    Procedure: BIOPSY, BONE MARROW;  Surgeon: Janee Olmos PA-C;  [facility]      ESOPHAGOSCOPY, GASTROSCOPY, DUODENOSCOPY (EGD), COMBINED N/A 2/22/2019    Procedure: Upper endoscopy with biopsy;  Surgeon: Magdalene Hobson MD;  [facility]      INSERT CATHETER VASCULAR ACCESS N/A 7/20/2020    Procedure: NON -APHERESIS Double lumen  tunneled central burns line placement;  Surgeon: Pato Gómez PA-C;  [facility]      INSERT CATHETER VASCULAR ACCESS N/A 2/8/2021    Procedure: apheresis catheter placement;  Surgeon: Pato Gómez PA-C;  [facility]      INSERT CATHETER VASCULAR ACCESS APHERESIS CHILD N/A 6/10/2020    Procedure: Large Bore Double Lumen NON TUNNELED Apheresis Catheter placement;  Surgeon: Link Rios PA-C;  [facility]      INSERT CATHETER VASCULAR ACCESS DOUBLE LUMEN CHILD N/A 10/26/2018    Procedure: double lumen tunneled line placement;  Surgeon: Rex Valente MD;  [facility]      INSERT PORT VASCULAR ACCESS N/A 6/10/2020    Procedure: Port placement;  Surgeon: Link Rios PA-C;  [facility]      IR CHEST PORT PLACEMENT > 5 YRS OF AGE  6/10/2020     IR CVC NON TUNNEL LINE REMOVAL  6/12/2020     IR CVC NON TUNNEL PLACEMENT  6/10/2020     IR CVC TUNNEL PLACEMENT > 5 YRS OF AGE  7/20/2020     IR CVC TUNNEL PLACEMENT > 5 YRS OF AGE  2/8/2021     IR CVC TUNNEL REMOVAL LEFT  2/8/2019     IR CVC TUNNEL REMOVAL LEFT  8/24/2020     IR CVC TUNNEL REMOVAL LEFT  4/9/2021     IR PORT REMOVAL LEFT  5/16/2019     O CLAVICLE LEFT Left     fracture with surgical repair and plate/screws     ORTHOPEDIC SURGERY      femur     REMOVE CATHETER VASCULAR ACCESS N/A 2/8/2019    Procedure: Tunneled line removal;  Surgeon: Krystal Esparza MD;  [facility]      REMOVE CATHETER VASCULAR ACCESS N/A 8/24/2020    Procedure: tunneled line removal;  Surgeon: Siri Hernandez PA-C;  [facility]      REMOVE CATHETER VASCULAR ACCESS Left 4/9/2021    Procedure: REMOVAL, VASCULAR ACCESS CATHETER;  Surgeon: Link Rios PA-C;  [facility]      REMOVE PORT VASCULAR ACCESS N/A 5/16/2019    Procedure: REMOVAL, VASCULAR ACCESS PORT;  Surgeon: Link Rios PA-C;  [facility]        Family History  Non-contributory-- no recent changes    Social History  Lives locally in [place], 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  A delay in compatible RBCs may occur.Stem cell transplant patient.  Give type O RBCs.  Requires Benadryl and tylenol as premed for platelets and RBCs for hx of hives     Voriconazole Photosensitivity     Significant rash - do not rechallenge       Physical Exam   Vital Signs for Peds 2/20/2023   SYSTOLIC 121   DIASTOLIC 75   PULSE 81   TEMPERATURE 98.3   RESPIRATIONS 18   WEIGHT (kg) 62.2 kg   HEIGHT (cm) 175.1 cm   BMI 20.29   PAIN SCORE 0   O2 96   GEN: awake, alert, appears stated age, well-appearing, well-groomed and casually dressed. NAD.   HEENT: NC/AT, full head of hair, PERRL, EOMI, nares patent, dentition in good repair, oropharynx unremarkable and without tonsillar erythema or exudate, no cervical LAD, neck supple, trachea midline  CARD: RRR, no m/r/g, cap refill <2 sec, radial pulses 2+ bilaterally  RESP: CTAB, no adventitious sounds, normal S1/S2  ABD: soft, flat, NT/ND, +BS  EXTREM: WWP, MAEE  SKIN: globally dry, no rashes or lesions on exposed skin  ACCESS: none    Labs  To be obtained 2/22    Assessment and Plan   21 yo male with a history of multiply-relapsed pre-B cell ALL now 1 year post-second BMT ([facility]). Clinically well today, no concerns for undergoing sedated procedures 2/22.    BMT:  #  Primary diagnosis: high-risk B cell ALL (CNS neg) + JAK2 activation: s/p MSD BMT (relapsed at 1.5 years post-BMT), [facility] (lost B cell aplasia and had new clone at day 60 post-Kymriah) and PLAT-05 (CAR19/22) at [place] (loss of CD22 CAR and rejection of CD19 CAR without evidence of disease day +21). Of note, initial post-CAR-T therapy was complicated  by Grade III CRS necessitating Tocilizumab x3, dopamine pressor support, and steroids. No CRS, neurotoxicity, or infectious complications for [place] CAR-T. He received preparative regimen for 2nd BMT per MT2013-09 with Thiotepa, Fludarabine, Busulfan, and ATG and received a [facility] on 2/16/21. Engrafted day +20. Day +21 peripheral VNTRs and marrow chimerism 100% with no evidence of disease. Day 100 and 180 studies 100% donor, also with no evidence of disease.  - 2 year BMBx on weds 2/22, awaiting confirmation of orders, including Clonoseq MRD testing and LP, from primary BMT MD   - will inquire about adding on vaccine consult, may not have yet begun re-vaccination process  - 2 year anniversary visit with Dr. Solomon on 3/1, labwork to be obtained 2/22    #  Risk for GVHD: no current concerns    FEN/Renal:  # Risk for malnutrition: eating regular diet, no current concern    Cardiovascular:  # WPW: asymptomatic, diagnosed 2018  - EKG today with WPW, will obtain Echo during 2 year anniversary evaluations    Heme:   # Pancytopenia secondary to chemotherapy: resolved, transfusion independent    Infectious Disease:  # Risk for infection given immunocompromised status  Active: None  Prophylaxis: None    Past infection:  - COVID19 January 2022    SEBAS Menendez, PA-C  Pediatric Blood and Marrow Transplant & Cellular Therapy Program  [facility]  Pager: [phone]  Fax: [phone]    I spent a total of 75 minutes with Artemio Nino on the date of encounter doing chart review, history and exam, review of labs/imaging, discussion with the family, documentation, and discussion of anniversary evaluations with primary BMT team and further activities as noted above.     Patient Active Problem List   Diagnosis     Acute lymphoblastic leukemia (ALL) in remission (H)     Aaron-Parkinson-White (WPW) syndrome     Bone marrow transplant candidate     ALL (acute lymphoblastic leukemia  of infant) (H)     At risk for infection     S/P allogeneic bone marrow transplant (H)     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     Examination of participant or control in clinical research

## 2023-02-20 NOTE — PROGRESS NOTES
"Pediatric Bone Marrow Transplant History and Physical  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 He presents to clinic today for a History and Physical prior to his 2 year anniversary BMBx later this week.    Isael reports that he has been doing very well recently, and feels he continues to get \"better and better\". He is not following with any medical teams regularly. He was last seen at [facility] in June 2022. He was also seen by dermatology in the past year for a erythematous macular rash, that resolved with application of Kenalog ointment and has remained well-controlled with frequent emollient application. He otherwise has not required medical attention. He is no longer taking medications, and denies any recent infections. He experienced COVID-19 approximately 1 year ago. No additional COVID vaccines since his initial 2 dose series, did not receive seasonal influenza vaccine this year. Hx of WPW, has not required regular cardiology follow-up for monitoring.    Today he reports that he is feeling well without recent fever, cough, congestion, rhinorrhea, odynophagia, GI dysregulation, headache, vision/hearing changes, rashes, joint stiffness or pain, weight loss, or difficulties with mobility or energy. He does endorse dry skin, as noted above, which is baseline for him.    ROS: A complete review of systems is negative except as noted in HPI    Past Medical History  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)      History of blood transfusion      WPW (Aaron-Parkinson-White syndrome) 03/2018       Past Surgical History  Past Surgical History:   Procedure Laterality Date     BONE MARROW BIOPSY, BONE SPECIMEN, NEEDLE/TROCAR Right 11/27/2018    Procedure: bone marrow biopsy;  Surgeon: Lia, " Jacqueline PLAZA NP;  [facility]      BONE MARROW BIOPSY, BONE SPECIMEN, NEEDLE/TROCAR N/A 2/8/2019    Procedure: BIOPSY BONE MARROW;  Surgeon: Lisa Workman NP;  [facility]      BONE MARROW BIOPSY, BONE SPECIMEN, NEEDLE/TROCAR N/A 5/16/2019    Procedure: BIOPSY, BONE MARROW;  Surgeon: Lilia López, SNOW CNP;  [facility]     BONE MARROW BIOPSY, BONE SPECIMEN, NEEDLE/TROCAR N/A 11/7/2019    Procedure: Bone marrow biopsy;  Surgeon: Jacqueline Shin NP;  [facility]      BONE MARROW BIOPSY, BONE SPECIMEN, NEEDLE/TROCAR Right 6/10/2020    Procedure: BIOPSY, BONE MARROW;  Surgeon: Candido Han PA-C;  [facility]      BONE MARROW BIOPSY, BONE SPECIMEN, NEEDLE/TROCAR N/A 8/24/2020    Procedure: Bone marrow biopsy;  Surgeon: Jacqueline Shin NP;  [facility]      BONE MARROW BIOPSY, BONE SPECIMEN, NEEDLE/TROCAR N/A 9/25/2020    Procedure: BIOPSY, BONE MARROW;  Surgeon: Lilia López, SNOW CNP;  [facility]      BONE MARROW BIOPSY, BONE SPECIMEN, NEEDLE/TROCAR N/A 3/11/2021    Procedure: BIOPSY BONE MARROW;  Surgeon: Lisa Workman NP;  [facility]      BONE MARROW BIOPSY, BONE SPECIMEN, NEEDLE/TROCAR N/A 5/21/2021    Procedure: BIOPSY, BONE MARROW;  Surgeon: Jacqueline Shin NP;  [facility]      BONE MARROW BIOPSY, BONE SPECIMEN, NEEDLE/TROCAR N/A 8/11/2021    Procedure: BIOPSY BONE MARROW;  Surgeon: Lisa Workman NP;  [facility]      BONE MARROW BIOPSY, BONE SPECIMEN, NEEDLE/TROCAR Right 2/9/2022    Procedure: BIOPSY, BONE MARROW;  Surgeon: Janee Olmos PA-C;  [facility]      ESOPHAGOSCOPY, GASTROSCOPY, DUODENOSCOPY (EGD), COMBINED N/A 2/22/2019    Procedure: Upper endoscopy with biopsy;  Surgeon: Magdalene Hobson MD;  [facility]      INSERT CATHETER VASCULAR ACCESS N/A 7/20/2020    Procedure: NON -APHERESIS Double lumen  tunneled central burns line placement;  Surgeon: Pato Gómez PA-C;  [facility]      INSERT CATHETER VASCULAR ACCESS N/A 2/8/2021    Procedure: apheresis catheter placement;  Surgeon: Pato Gómez PA-C;  [facility]      INSERT CATHETER VASCULAR ACCESS APHERESIS CHILD N/A 6/10/2020    Procedure: Large Bore Double Lumen NON TUNNELED Apheresis Catheter placement;  Surgeon: Link Rios PA-C;  [facility]      INSERT CATHETER VASCULAR ACCESS DOUBLE LUMEN CHILD N/A 10/26/2018    Procedure: double lumen tunneled line placement;  Surgeon: Rex Valente MD;  [facility]      INSERT PORT VASCULAR ACCESS N/A 6/10/2020    Procedure: Port placement;  Surgeon: Link Rios PA-C;  [facility]      IR CHEST PORT PLACEMENT > 5 YRS OF AGE  6/10/2020     IR CVC NON TUNNEL LINE REMOVAL  6/12/2020     IR CVC NON TUNNEL PLACEMENT  6/10/2020     IR CVC TUNNEL PLACEMENT > 5 YRS OF AGE  7/20/2020     IR CVC TUNNEL PLACEMENT > 5 YRS OF AGE  2/8/2021     IR CVC TUNNEL REMOVAL LEFT  2/8/2019     IR CVC TUNNEL REMOVAL LEFT  8/24/2020     IR CVC TUNNEL REMOVAL LEFT  4/9/2021     IR PORT REMOVAL LEFT  5/16/2019     O CLAVICLE LEFT Left     fracture with surgical repair and plate/screws     ORTHOPEDIC SURGERY      femur     REMOVE CATHETER VASCULAR ACCESS N/A 2/8/2019    Procedure: Tunneled line removal;  Surgeon: Krystal Esparza MD;  [facility]      REMOVE CATHETER VASCULAR ACCESS N/A 8/24/2020    Procedure: tunneled line removal;  Surgeon: Siri Hernandez PA-C;  [facility]      REMOVE CATHETER VASCULAR ACCESS Left 4/9/2021    Procedure: REMOVAL, VASCULAR ACCESS CATHETER;  Surgeon: Link Rios PA-C;  [facility]      REMOVE PORT VASCULAR ACCESS N/A 5/16/2019    Procedure: REMOVAL, VASCULAR ACCESS PORT;  Surgeon: Link Rios PA-C;  [facility]        Family History  Non-contributory-- no recent changes    Social History  Lives locally in [place], 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  A delay in compatible RBCs may occur.Stem cell transplant patient.  Give type O RBCs.  Requires Benadryl and tylenol as premed for platelets and RBCs for hx of hives     Voriconazole Photosensitivity     Significant rash - do not rechallenge       Physical Exam   Vital Signs for Peds 2/20/2023   SYSTOLIC 121   DIASTOLIC 75   PULSE 81   TEMPERATURE 98.3   RESPIRATIONS 18   WEIGHT (kg) 62.2 kg   HEIGHT (cm) 175.1 cm   BMI 20.29   PAIN SCORE 0   O2 96   GEN: awake, alert, appears stated age, well-appearing, well-groomed and casually dressed. NAD.   HEENT: NC/AT, full head of hair, PERRL, EOMI, nares patent, dentition in good repair, oropharynx unremarkable and without tonsillar erythema or exudate, no cervical LAD, neck supple, trachea midline  CARD: RRR, no m/r/g, cap refill <2 sec, radial pulses 2+ bilaterally  RESP: CTAB, no adventitious sounds, normal S1/S2  ABD: soft, flat, NT/ND, +BS  EXTREM: WWP, MAEE  SKIN: globally dry, no rashes or lesions on exposed skin  ACCESS: none    Labs  To be obtained 2/22    Assessment and Plan   21 yo male with a history of multiply-relapsed pre-B cell ALL now 1 year post-second BMT ([facility]). Clinically well today, no concerns for undergoing sedated procedures 2/22.    BMT:  #  Primary diagnosis: high-risk B cell ALL (CNS neg) + JAK2 activation: s/p MSD BMT (relapsed at 1.5 years post-BMT), K[facility] (lost B cell aplasia and had new clone at day 60 post-Kymriah) and PLAT-05 (CAR19/22) at [facility] (loss of CD22 CAR and rejection of CD19 CAR without evidence of disease day +21). Of note, initial post-CAR-T therapy was complicated  by Grade III CRS necessitating Tocilizumab x3, dopamine pressor support, and steroids. No CRS, neurotoxicity, or infectious complications for [place] CAR-T. He received preparative regimen for 2nd BMT per [id number] with Thiotepa, Fludarabine, Busulfan, and ATG and received a [facility] on 2/16/21. Engrafted day +20. Day +21 peripheral VNTRs and marrow chimerism 100% with no evidence of disease. Day 100 and 180 studies 100% donor, also with no evidence of disease.  - 2 year BMBx on weds 2/22, awaiting confirmation of orders, including Clonoseq MRD testing and LP, from primary BMT MD   - will inquire about adding on vaccine consult, may not have yet begun re-vaccination process  - 2 year anniversary visit with Dr. Solomon on 3/1, labwork to be obtained 2/22    #  Risk for GVHD: no current concerns    FEN/Renal:  # Risk for malnutrition: eating regular diet, no current concern    Cardiovascular:  # WPW: asymptomatic, diagnosed 2018  - EKG today with WPW, will obtain Echo during 2 year anniversary evaluations    Heme:   # Pancytopenia secondary to chemotherapy: resolved, transfusion independent    Infectious Disease:  # Risk for infection given immunocompromised status  Active: None  Prophylaxis: None    Past infection:  - COVID19 January 2022    SEBAS Menendez, PA-C  Pediatric Blood and Marrow Transplant & Cellular Therapy Program  [facility]  Pager: [phone]  Fax: [phone]    I spent a total of 75 minutes with Artemio Nino on the date of encounter doing chart review, history and exam, review of labs/imaging, discussion with the family, documentation, and discussion of anniversary evaluations with primary BMT team and further activities as noted above.     Patient Active Problem List   Diagnosis     Acute lymphoblastic leukemia (ALL) in remission (H)     Aaron-Parkinson-White (WPW) syndrome     Bone marrow transplant candidate     ALL (acute lymphoblastic leukemia  of infant) (H)     At risk for infection     S/P allogeneic bone marrow transplant (H)     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     Examination of participant or control in clinical research

## 2023-02-21 ENCOUNTER — ANESTHESIA EVENT (OUTPATIENT)
Dept: PEDIATRICS | Facility: CLINIC | Age: 23
End: 2023-02-21
Payer: COMMERCIAL

## 2023-02-21 ASSESSMENT — ENCOUNTER SYMPTOMS
APNEA: 0
DYSRHYTHMIAS: 1

## 2023-02-22 ENCOUNTER — HOSPITAL ENCOUNTER (OUTPATIENT)
Facility: CLINIC | Age: 23
Discharge: HOME | End: 2023-02-22
Attending: PEDIATRICS | Admitting: PHYSICIAN ASSISTANT
Payer: COMMERCIAL

## 2023-02-22 ENCOUNTER — ONCOLOGY VISIT (OUTPATIENT)
Dept: TRANSPLANT | Facility: CLINIC | Age: 23
End: 2023-02-22
Attending: PHYSICIAN ASSISTANT
Payer: COMMERCIAL

## 2023-02-22 ENCOUNTER — ANESTHESIA (OUTPATIENT)
Dept: PEDIATRICS | Facility: CLINIC | Age: 23
End: 2023-02-22
Payer: COMMERCIAL

## 2023-02-22 ENCOUNTER — ONCOLOGY VISIT (OUTPATIENT)
Dept: TRANSPLANT | Facility: CLINIC | Age: 23
End: 2023-02-22
Attending: PEDIATRICS
Payer: COMMERCIAL

## 2023-02-22 VITALS
SYSTOLIC BLOOD PRESSURE: 93 MMHG | DIASTOLIC BLOOD PRESSURE: 64 MMHG | HEART RATE: 48 BPM | OXYGEN SATURATION: 97 % | RESPIRATION RATE: 16 BRPM | TEMPERATURE: 97.7 F

## 2023-02-22 DIAGNOSIS — Z52.3 BONE MARROW DONOR: Primary | ICD-10-CM

## 2023-02-22 DIAGNOSIS — Z52.3 BONE MARROW DONOR: ICD-10-CM

## 2023-02-22 DIAGNOSIS — C91.01 ACUTE LYMPHOBLASTIC LEUKEMIA (ALL) IN REMISSION (H): ICD-10-CM

## 2023-02-22 DIAGNOSIS — C91.02 ACUTE LYMPHOBLASTIC LEUKEMIA (ALL) IN RELAPSE (H): Primary | ICD-10-CM

## 2023-02-22 LAB
HOLD SPECIMEN: NORMAL
INTERPRETATION: NORMAL
LAB DIRECTOR DISCLAIMER: NORMAL
LAB DIRECTOR INTERPRETATION: NORMAL
LAB DIRECTOR METHODOLOGY: NORMAL
LAB DIRECTOR RESULTS: NORMAL
SIGNIFICANT RESULTS: NORMAL
SPECIMEN DESCRIPTION: NORMAL
SPECIMEN DESCRIPTION: NORMAL
TEST DETAILS, MDL: NORMAL

## 2023-02-22 PROCEDURE — 81270 JAK2 GENE: CPT | Performed by: PHYSICIAN ASSISTANT

## 2023-02-22 PROCEDURE — 88311 DECALCIFY TISSUE: CPT | Mod: 26 | Performed by: PATHOLOGY

## 2023-02-22 PROCEDURE — 88368 INSITU HYBRIDIZATION MANUAL: CPT | Mod: 26 | Performed by: MEDICAL GENETICS

## 2023-02-22 PROCEDURE — 99207 PR NO BILLABLE SERVICE THIS VISIT: CPT | Performed by: PHYSICIAN ASSISTANT

## 2023-02-22 PROCEDURE — 258N000003 HC RX IP 258 OP 636

## 2023-02-22 PROCEDURE — 88264 CHROMOSOME ANALYSIS 20-25: CPT | Performed by: PHYSICIAN ASSISTANT

## 2023-02-22 PROCEDURE — 88369 M/PHMTRC ALYSISHQUANT/SEMIQ: CPT | Mod: 26 | Performed by: MEDICAL GENETICS

## 2023-02-22 PROCEDURE — 250N000011 HC RX IP 250 OP 636

## 2023-02-22 PROCEDURE — 88275 CYTOGENETICS 100-300: CPT | Performed by: PHYSICIAN ASSISTANT

## 2023-02-22 PROCEDURE — 88189 FLOWCYTOMETRY/READ 16 & >: CPT | Mod: GC | Performed by: PATHOLOGY

## 2023-02-22 PROCEDURE — 250N000009 HC RX 250

## 2023-02-22 PROCEDURE — 250N000009 HC RX 250: Performed by: PHYSICIAN ASSISTANT

## 2023-02-22 PROCEDURE — G0452 MOLECULAR PATHOLOGY INTERPR: HCPCS | Mod: 26 | Performed by: STUDENT IN AN ORGANIZED HEALTH CARE EDUCATION/TRAINING PROGRAM

## 2023-02-22 PROCEDURE — 88305 TISSUE EXAM BY PATHOLOGIST: CPT | Mod: 26 | Performed by: PATHOLOGY

## 2023-02-22 PROCEDURE — 999N000141 HC STATISTIC PRE-PROCEDURE NURSING ASSESSMENT: Performed by: PHYSICIAN ASSISTANT

## 2023-02-22 PROCEDURE — 38222 DX BONE MARROW BX & ASPIR: CPT | Performed by: PHYSICIAN ASSISTANT

## 2023-02-22 PROCEDURE — 88342 IMHCHEM/IMCYTCHM 1ST ANTB: CPT | Mod: 26 | Performed by: PATHOLOGY

## 2023-02-22 PROCEDURE — 272N000008 HC KIT BIOPSY BONE MARROW: Performed by: PHYSICIAN ASSISTANT

## 2023-02-22 PROCEDURE — 85097 BONE MARROW INTERPRETATION: CPT | Performed by: PATHOLOGY

## 2023-02-22 PROCEDURE — 81267 CHIMERISM ANAL NO CELL SELEC: CPT | Mod: 59 | Performed by: PHYSICIAN ASSISTANT

## 2023-02-22 PROCEDURE — 250N000025 HC SEVOFLURANE, PER MIN: Performed by: PHYSICIAN ASSISTANT

## 2023-02-22 PROCEDURE — 88185 FLOWCYTOMETRY/TC ADD-ON: CPT | Performed by: PHYSICIAN ASSISTANT

## 2023-02-22 PROCEDURE — 88311 DECALCIFY TISSUE: CPT | Mod: TC | Performed by: PHYSICIAN ASSISTANT

## 2023-02-22 PROCEDURE — 999N000131 HC STATISTIC POST-PROCEDURE RECOVERY CARE: Performed by: PHYSICIAN ASSISTANT

## 2023-02-22 PROCEDURE — 85060 BLOOD SMEAR INTERPRETATION: CPT | Performed by: PATHOLOGY

## 2023-02-22 PROCEDURE — 88237 TISSUE CULTURE BONE MARROW: CPT | Performed by: PHYSICIAN ASSISTANT

## 2023-02-22 PROCEDURE — 370N000017 HC ANESTHESIA TECHNICAL FEE, PER MIN: Performed by: PHYSICIAN ASSISTANT

## 2023-02-22 PROCEDURE — 88291 CYTO/MOLECULAR REPORT: CPT | Performed by: MEDICAL GENETICS

## 2023-02-22 PROCEDURE — 88305 TISSUE EXAM BY PATHOLOGIST: CPT | Mod: TC | Performed by: PHYSICIAN ASSISTANT

## 2023-02-22 PROCEDURE — 88184 FLOWCYTOMETRY/ TC 1 MARKER: CPT | Performed by: PHYSICIAN ASSISTANT

## 2023-02-22 RX ORDER — PROPOFOL 10 MG/ML
INJECTION, EMULSION INTRAVENOUS CONTINUOUS PRN
Status: DISCONTINUED | OUTPATIENT
Start: 2023-02-22 | End: 2023-02-22

## 2023-02-22 RX ORDER — DEXMEDETOMIDINE HYDROCHLORIDE 4 UG/ML
INJECTION, SOLUTION INTRAVENOUS PRN
Status: DISCONTINUED | OUTPATIENT
Start: 2023-02-22 | End: 2023-02-22

## 2023-02-22 RX ORDER — FENTANYL CITRATE 50 UG/ML
INJECTION, SOLUTION INTRAMUSCULAR; INTRAVENOUS PRN
Status: DISCONTINUED | OUTPATIENT
Start: 2023-02-22 | End: 2023-02-22

## 2023-02-22 RX ORDER — SODIUM CHLORIDE, SODIUM LACTATE, POTASSIUM CHLORIDE, CALCIUM CHLORIDE 600; 310; 30; 20 MG/100ML; MG/100ML; MG/100ML; MG/100ML
INJECTION, SOLUTION INTRAVENOUS CONTINUOUS PRN
Status: DISCONTINUED | OUTPATIENT
Start: 2023-02-22 | End: 2023-02-22

## 2023-02-22 RX ORDER — EPHEDRINE SULFATE 50 MG/ML
INJECTION, SOLUTION INTRAMUSCULAR; INTRAVENOUS; SUBCUTANEOUS PRN
Status: DISCONTINUED | OUTPATIENT
Start: 2023-02-22 | End: 2023-02-22

## 2023-02-22 RX ORDER — PROPOFOL 10 MG/ML
INJECTION, EMULSION INTRAVENOUS PRN
Status: DISCONTINUED | OUTPATIENT
Start: 2023-02-22 | End: 2023-02-22

## 2023-02-22 RX ORDER — ONDANSETRON 2 MG/ML
INJECTION INTRAMUSCULAR; INTRAVENOUS PRN
Status: DISCONTINUED | OUTPATIENT
Start: 2023-02-22 | End: 2023-02-22

## 2023-02-22 RX ADMIN — PHENYLEPHRINE HYDROCHLORIDE 25 MCG: 10 INJECTION INTRAVENOUS at 12:43

## 2023-02-22 RX ADMIN — FENTANYL CITRATE 50 MCG: 50 INJECTION, SOLUTION INTRAMUSCULAR; INTRAVENOUS at 12:27

## 2023-02-22 RX ADMIN — DEXMEDETOMIDINE 20 MCG: 100 INJECTION, SOLUTION, CONCENTRATE INTRAVENOUS at 12:16

## 2023-02-22 RX ADMIN — Medication 5 MG: at 12:38

## 2023-02-22 RX ADMIN — SODIUM CHLORIDE, POTASSIUM CHLORIDE, SODIUM LACTATE AND CALCIUM CHLORIDE: 600; 310; 30; 20 INJECTION, SOLUTION INTRAVENOUS at 12:16

## 2023-02-22 RX ADMIN — SODIUM CHLORIDE, POTASSIUM CHLORIDE, SODIUM LACTATE AND CALCIUM CHLORIDE: 600; 310; 30; 20 INJECTION, SOLUTION INTRAVENOUS at 11:43

## 2023-02-22 RX ADMIN — ONDANSETRON 4 MG: 2 INJECTION INTRAMUSCULAR; INTRAVENOUS at 12:25

## 2023-02-22 RX ADMIN — PROPOFOL 150 MCG/KG/MIN: 10 INJECTION, EMULSION INTRAVENOUS at 12:25

## 2023-02-22 RX ADMIN — PROPOFOL 50 MG: 10 INJECTION, EMULSION INTRAVENOUS at 12:25

## 2023-02-22 RX ADMIN — Medication 5 MG: at 12:36

## 2023-02-22 ASSESSMENT — ACTIVITIES OF DAILY LIVING (ADL)
ADLS_ACUITY_SCORE: 37
ADLS_ACUITY_SCORE: 37

## 2023-02-22 NOTE — ANESTHESIA PREPROCEDURE EVALUATION
"Anesthesia Pre-Procedure Evaluation    Patient: Artemio Nino   MRN:     [id number] Gender:   male   Age:    22 year old :      2000        Procedure(s):  BIOPSY, BONE MARROW     LABS:  CBC:   Lab Results   Component Value Date    WBC 8.5 2022    WBC 5.2 2021    HGB 14.2 2022    HGB 13.7 2021    HCT 42.6 2022    HCT 40.0 2021     2022    PLT 82 (L) 2021     BMP:   Lab Results   Component Value Date     2022     2021    POTASSIUM 4.3 2022    POTASSIUM 4.4 2021    CHLORIDE 110 (H) 2022    CHLORIDE 107 2021    CO2 23 2022    CO2 26 2021    BUN 18 2022    BUN 17 2021    CR 0.86 2022    CR 0.88 2021    GLC 93 2022    GLC 90 2021     COAGS:   Lab Results   Component Value Date    PTT 52 (H) 2021    INR 1.01 2021    FIBR 578 (H) 2021     POC:   Lab Results   Component Value Date    BGM 98 2020     OTHER:   Lab Results   Component Value Date    ELSA 9.4 2022    PHOS 3.2 2021    MAG 2.3 2021    ALBUMIN 3.9 2022    PROTTOTAL 7.1 2022    ALT 53 2022    AST 27 2022     (H) 2020    ALKPHOS 53 2022    BILITOTAL 0.6 2022    TSH 4.28 (H) 2022    T4 0.85 2022    CRP <2.9 2020        Preop Vitals    BP Readings from Last 3 Encounters:   23 115/69   23 121/75   22 95/67    Pulse Readings from Last 3 Encounters:   23 68   23 81   22 61      Resp Readings from Last 3 Encounters:   23 18   23 18   22 16    SpO2 Readings from Last 3 Encounters:   23 98%   23 96%   22 99%      Temp Readings from Last 1 Encounters:   23 36.6  C (97.8  F) (Oral)    Ht Readings from Last 1 Encounters:   23 1.751 m (5' 8.94\")      Wt Readings from Last 1 Encounters:   23 62.2 kg (137 lb 2 oz)    Estimated " "body mass index is 20.29 kg/m  as calculated from the following:    Height as of 2/20/23: 1.751 m (5' 8.94\").    Weight as of 2/20/23: 62.2 kg (137 lb 2 oz).     LDA:  Port A Cath Single 06/10/20 Right Chest wall (Active)   Site Assessment WDL 02/09/22 1245   Dressing Status clean;dry;intact 02/09/22 1245   Dressing Intervention New dressing 05/26/21 1113   Dressing change due 02/15/21 02/08/21 1201   Line Status Heparin locked 02/09/22 1245   Access Date 02/09/22 02/09/22 1000   Access Attempts 1 02/09/22 1000   Gauge Power noncoring 90 degree bend;20 gauge;3/4 inch 02/09/22 1000   Needle Change Due 06/18/21 05/21/21 1130   Line Necessity Yes, meets criteria 02/09/22 1000   De-Access Date 02/09/22 02/09/22 1245   Date to be Reflushed 06/29/21 06/01/21 1000   Extravasation? No 05/19/21 1200   Number of days: 987        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)      History of blood transfusion      WPW (Aaron-Parkinson-White syndrome) 03/2018      Past Surgical History:   Procedure Laterality Date     BONE MARROW BIOPSY, BONE SPECIMEN, NEEDLE/TROCAR Right 11/27/2018    Procedure: bone marrow biopsy;  Surgeon: Jacqueline Fitzgerald NP;  [facility]      BONE MARROW BIOPSY, BONE SPECIMEN, NEEDLE/TROCAR N/A 2/8/2019    Procedure: BIOPSY BONE MARROW;  Surgeon: Lisa Workman NP;  [facility]      BONE MARROW BIOPSY, BONE SPECIMEN, NEEDLE/TROCAR N/A 5/16/2019    Procedure: BIOPSY, BONE MARROW;  Surgeon: Lilia López APRN CNP;  [facility]     BONE MARROW BIOPSY, BONE SPECIMEN, NEEDLE/TROCAR N/A 11/7/2019    Procedure: Bone marrow biopsy;  Surgeon: Jacqueline Shin NP;  [facility]      BONE MARROW BIOPSY, BONE SPECIMEN, NEEDLE/TROCAR Right 6/10/2020    Procedure: BIOPSY, BONE MARROW;  Surgeon: Candido Han PA-C;  [facility]      BONE MARROW BIOPSY, BONE SPECIMEN, NEEDLE/TROCAR N/A 8/24/2020    Procedure: " Bone marrow biopsy;  Surgeon: Jacqueline Shin NP;  [facility]      BONE MARROW BIOPSY, BONE SPECIMEN, NEEDLE/TROCAR N/A 9/25/2020    Procedure: BIOPSY, BONE MARROW;  Surgeon: Lilia López APRN CNP;  [facility]      BONE MARROW BIOPSY, BONE SPECIMEN, NEEDLE/TROCAR N/A 3/11/2021    Procedure: BIOPSY BONE MARROW;  Surgeon: Lisa Workman NP;  [facility]      BONE MARROW BIOPSY, BONE SPECIMEN, NEEDLE/TROCAR N/A 5/21/2021    Procedure: BIOPSY, BONE MARROW;  Surgeon: Jacqueline Shin NP;  [facility]      BONE MARROW BIOPSY, BONE SPECIMEN, NEEDLE/TROCAR N/A 8/11/2021    Procedure: BIOPSY BONE MARROW;  Surgeon: Lisa Workman NP;  [facility]      BONE MARROW BIOPSY, BONE SPECIMEN, NEEDLE/TROCAR Right 2/9/2022    Procedure: BIOPSY, BONE MARROW;  Surgeon: Janee Olmos PA-C;  [facility]      ESOPHAGOSCOPY, GASTROSCOPY, DUODENOSCOPY (EGD), COMBINED N/A 2/22/2019    Procedure: Upper endoscopy with biopsy;  Surgeon: Magdalene Hobson MD;  [facility]      INSERT CATHETER VASCULAR ACCESS N/A 7/20/2020    Procedure: NON -APHERESIS Double lumen tunneled central burns line placement;  Surgeon: Pato Gómez PA-C;  [facility]      INSERT CATHETER VASCULAR ACCESS N/A 2/8/2021    Procedure: apheresis catheter placement;  Surgeon: Pato Gómez PA-C;  [facility]      INSERT CATHETER VASCULAR ACCESS APHERESIS CHILD N/A 6/10/2020    Procedure: Large Bore Double Lumen NON TUNNELED Apheresis Catheter placement;  Surgeon: Link Rios PA-C;  [facility]      INSERT CATHETER VASCULAR ACCESS DOUBLE LUMEN CHILD N/A 10/26/2018    Procedure: double lumen tunneled line placement;  Surgeon: Rex Valente MD;  [facility]      INSERT PORT VASCULAR ACCESS N/A 6/10/2020    Procedure: Port placement;  Surgeon: Link Rios  CEE Wynne;  [facility]      IR CHEST PORT PLACEMENT > 5 YRS OF AGE  6/10/2020     IR CVC NON TUNNEL LINE REMOVAL  6/12/2020     IR CVC NON TUNNEL PLACEMENT  6/10/2020     IR CVC TUNNEL PLACEMENT > 5 YRS OF AGE  7/20/2020     IR CVC TUNNEL PLACEMENT > 5 YRS OF AGE  2/8/2021     IR CVC TUNNEL REMOVAL LEFT  2/8/2019     IR CVC TUNNEL REMOVAL LEFT  8/24/2020     IR CVC TUNNEL REMOVAL LEFT  4/9/2021     IR PORT REMOVAL LEFT  5/16/2019     O CLAVICLE LEFT Left     fracture with surgical repair and plate/screws     ORTHOPEDIC SURGERY      femur     REMOVE CATHETER VASCULAR ACCESS N/A 2/8/2019    Procedure: Tunneled line removal;  Surgeon: Krystal Esparza MD;  [facility]      REMOVE CATHETER VASCULAR ACCESS N/A 8/24/2020    Procedure: tunneled line removal;  Surgeon: Siri Hernandez PA-C;  [facility]      REMOVE CATHETER VASCULAR ACCESS Left 4/9/2021    Procedure: REMOVAL, VASCULAR ACCESS CATHETER;  Surgeon: Link Rios PA-C;  [facility]      REMOVE PORT VASCULAR ACCESS N/A 5/16/2019    Procedure: REMOVAL, VASCULAR ACCESS PORT;  Surgeon: Link Rios PA-C;  [facility]      Allergies   Allergen Reactions     Blood Transfusion Related (Informational Only) Other (See Comments)     Patient has a history of a clinically significant antibody against RBC antigens.  A delay in compatible RBCs may occur.Stem cell transplant patient.  Give type O RBCs.  Requires Benadryl and tylenol as premed for platelets and RBCs for hx of hives     Voriconazole Photosensitivity     Significant rash - do not rechallenge        Anesthesia Evaluation    ROS/Med Hx    No history of anesthetic complications  (-) malignant hyperthermia and tuberculosis  Comments:   HPI:  Artemio Nino is a 22 year old male with a primary diagnosis of ALL who presents for BMB.    Review of anesthesia relevant diagnoses:  - (FH of) Malignant Hyperthermia: No  -  Challenges in airway management: No  - (FH of) PONV: No  - Other: Yes: HYPOTENSION with last 4 procedures requiring large doses of EPHEDRINE    Cardiovascular Findings   (+) dysrhythmias (WPW),  Comments:   TTE 02/07/2022: Patient s/p BMT. Normal TTE. LV and RV have normal chamber size, wall thickness, and systolic function. LVEF 68 %. Trivial tricuspid valve insufficiency. Estimated RVSP 20 mmHg plus RA pressure. Catheter seen with its tip in RA. No pericardial effusion. No significant change from last echocardiogram.    Neuro Findings - negative ROS    Pulmonary Findings   (-) asthma and apnea    HENT Findings - negative HENT ROS    Skin Findings - negative skin ROS      GI/Hepatic/Renal Findings   (-) GERD    Endocrine/Metabolic Findings - negative ROS      Genetic/Syndrome Findings - negative genetics/syndromes ROS    Hematology/Oncology Findings   (+) cancer and hematopoietic stem cell transplant            PHYSICAL EXAM:   Mental Status/Neuro: A/A/O   Airway: Facies: Feasible  Mallampati: I  Mouth/Opening: Full  TM distance: > 6 cm  Neck ROM: Full   Respiratory: Auscultation: CTAB     Resp. Rate: Normal     Resp. Effort: Normal      CV: Rhythm: Regular  Rate: Age appropriate  Heart: Normal Sounds  Edema: None   Comments:      Dental: Normal Dentition                Anesthesia Plan    ASA Status:  2   NPO Status:  NPO Appropriate    Anesthesia Type: General.     - Airway: Native airway   Induction: Intravenous.   Maintenance: TIVA.        Consents    Anesthesia Plan(s) and associated risks, benefits, and realistic alternatives discussed. Questions answered and patient/representative(s) expressed understanding.    - Discussed:     - Discussed with:  Patient      - Extended Intubation/Ventilatory Support Discussed: No.      - Patient is DNR/DNI Status: No    Use of blood products discussed: No .     Postoperative Care    Post procedure pain management: none anticipated.   PONV prophylaxis: Ondansetron (or other  5HT-3)     Comments:    Other Comments: Discussed common and potentially harmful risks for General Anesthesia, Native Airway.   These risks include, but were not limited to: Conversion to secured airway, Sore throat, Airway injury, Dental injury, Aspiration, Respiratory issues (Bronchospasm, Laryngospasm, Desaturation), Hemodynamic issues (Arrhythmia, Hypotension, Ischemia), Potential long term consequences of respiratory and hemodynamic issues, PONV, Emergence delirium/agitation  Risks of invasive procedures were not discussed: N/A    All questions were answered.    Discussed approach to use less Propofol considering need for high doses of Ephedrine in last procedures. Will receive fluid bolus before procedure and will discuss generous use of LA to decrease Propofol rate.         Con Patel MD

## 2023-02-22 NOTE — DISCHARGE INSTRUCTIONS
[facility]  [phone]    Care for Bone Marrow Biopsy  Do not remove bandage/dressing for 24 hours -- after this time they can be removed. If Steri-strips are presents they can stay on until they fall off  No bath, shower or soaking of the dressing for 24 hours  Activity as tolerated by the patient  Diet as able to tolerate  May use Tylenol as needed for pain control  Can apply icepack to the site for discomfort -- no more than 10 minutes at a time  If bleeding presents, apply pressure for 5 minutes    Call [phone] ask for Peds BMT/Hem/Onc fellow on call if complications arise including:   persistent bleeding  fever greater than 100.5  pain        Activity and Diet:    You were given medicine for sedation during the procedure.  You may be dizzy or sleepy for the rest of the day.     Do not drive any motorized vehicles or operate any potentially hazardous equipment until tomorrow.     Do not make important decisions or sign documents today.     You may return to your regular diet today if clear liquids do not upset your stomach.     You may restart your medications on discharge unless your doctor has instructed you differently.     Do not participate in contact sports, gymnastic or other complex movements requiring coordination to prevent injury until tomorrow.     You may return to school or work tomorrow.

## 2023-02-22 NOTE — PROCEDURES
BMT Bone Marrow Biopsy Procedure Note  February 22, 2023 1:36 PM    DIAGNOSIS: ALL, 2 years s/p umbilical cord blood transplant    PROCEDURE: Unilateral Bone Marrow Biopsy and Unilateral Aspirate    SITE: Pediatric Sedation Suite    Patient s identification was positively verified by verbal identification and invasive procedure safety checklist was completed.  Informed consent was obtained. Following the administration of propofol as sedation, the patient was placed in the left lateral decubitus position and prepped and draped in a sterile manner.  Approximately 2 cc of 1% Xylocaine was used over the right posterior iliac spine.  Following this a 3 mm incision was made. A trephine bone marrow core was obtained from the [facility]. Bone marrow aspirates were obtained from the [facility]. Aspirates were sent for morphology, immunophenotyping, cytogenetics, NGS, Clonoseq and molecular diagnostics VNTR.  A total of approximately 30 mL of marrow were aspirated.  Following this procedure a sterile dressing was applied to the bone marrow biopsy site.  Post-procedure wound care instructions were given. The patient tolerated the procedure well with no immediate discomfort.    Complications: None    Procedure performed by:     Candido Han PA-C  Pediatric Blood and Marrow Transplant Program  [facility]

## 2023-02-22 NOTE — ANESTHESIA POSTPROCEDURE EVALUATION
Patient: Artemio Nino    Procedure: Procedure(s):  BIOPSY, BONE MARROW       Anesthesia Type:  General    Note:  Disposition: Outpatient   Postop Pain Control: Uneventful            Sign Out: Well controlled pain   PONV: No   Neuro/Psych: Uneventful            Sign Out: Acceptable/Baseline neuro status   Airway/Respiratory: Uneventful            Sign Out: Acceptable/Baseline resp. status   CV/Hemodynamics: Uneventful            Sign Out: Acceptable CV status; No obvious hypovolemia; No obvious fluid overload   Other NRE:    DID A NON-ROUTINE EVENT OCCUR? No    Event details/Postop Comments:  - Overall uneventful course  - Attempted an approach with pre-procedural hydration (1000 ml), LESS Propofol (150 mcg/kg/min) and more LA by proceduralist today compared to previous anesthetic where large doses of Ephedrine were given  - Adequate anesthetic depth, needed overall less Ephedrine than with previous procedures (10 versus 20 mg), even though overall poor response to Ephedrine, may respond better to Glycopyrrolate (?)  - Summary: patient satisfied with today's anesthetic, woke up promptly and comfortable --> consider similar dosing as today (Dexmedetomidine 20 mcg, Fentanyl 50 mcg, Propofol 50 mg + drip 150 mck/kg/min) and preop fluid bolus --> consider using Glyco to optimize heart rate, patient typically gets very bradycardic           Last vitals:  Vitals Value Taken Time   BP     Temp     Pulse     Resp     SpO2 98 % 02/22/23 1252   Vitals shown include unvalidated device data.    Electronically Signed By: Con Patel MD  February 22, 2023  1:41 PM

## 2023-02-22 NOTE — PROGRESS NOTES
Orders reviewed. Procedure discussed with patient and all questions answered to the best of my ability.

## 2023-02-22 NOTE — ANESTHESIA CARE TRANSFER NOTE
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    Anesthesia Type:   General     Note:    Oropharynx: spontaneously breathing and oropharynx clear of all foreign objects  Level of Consciousness: awake and drowsy  Oxygen Supplementation: nasal cannula  Level of Supplemental Oxygen (L/min / FiO2): 3  Independent Airway: airway patency satisfactory and stable  Dentition: dentition unchanged  Vital Signs Stable: post-procedure vital signs reviewed and stable  Report to RN Given: handoff report given  Patient transferred to:  Recovery    Handoff Report: Identifed the Patient, Identified the Reponsible Provider, Reviewed the pertinent medical history, Discussed the surgical course, Reviewed Intra-OP anesthesia mangement and issues during anesthesia, Set expectations for post-procedure period and Allowed opportunity for questions and acknowledgement of understanding      Vitals:  Vitals Value Taken Time   BP 86/52    Temp 36.5    Pulse 53    Resp 16    SpO2 98 % 02/22/23 1252   Vitals shown include unvalidated device data.    Electronically Signed By: SNOW Jackson CRNA  February 22, 2023  1:00 PM

## 2023-02-24 LAB
PATH REPORT.COMMENTS IMP SPEC: NORMAL
PATH REPORT.FINAL DX SPEC: NORMAL
PATH REPORT.FINAL DX SPEC: NORMAL
PATH REPORT.GROSS SPEC: NORMAL
PATH REPORT.MICROSCOPIC SPEC OTHER STN: NORMAL
PATH REPORT.RELEVANT HX SPEC: NORMAL
PATH REPORT.RELEVANT HX SPEC: NORMAL

## 2023-02-27 LAB — CULTURE HARVEST COMPLETE DATE: NORMAL

## 2023-02-28 LAB — INTERPRETATION: NORMAL

## 2023-03-01 ENCOUNTER — VIRTUAL VISIT (OUTPATIENT)
Dept: TRANSPLANT | Facility: CLINIC | Age: 23
End: 2023-03-01
Attending: PEDIATRICS
Payer: COMMERCIAL

## 2023-03-01 VITALS — BODY MASS INDEX: 20.73 KG/M2 | WEIGHT: 140 LBS | HEIGHT: 69 IN

## 2023-03-01 DIAGNOSIS — C91.01 ACUTE LYMPHOBLASTIC LEUKEMIA (ALL) IN REMISSION (H): Primary | ICD-10-CM

## 2023-03-01 PROCEDURE — 99417 PROLNG OP E/M EACH 15 MIN: CPT | Performed by: PEDIATRICS

## 2023-03-01 PROCEDURE — 99215 OFFICE O/P EST HI 40 MIN: CPT | Mod: VID | Performed by: PEDIATRICS

## 2023-03-01 ASSESSMENT — PAIN SCALES - GENERAL: PAINLEVEL: NO PAIN (0)

## 2023-03-01 NOTE — PROGRESS NOTES
3/1/2023    Dear Children's Care Team:     We had the pleasure of seeing your patient, Artemio Nino, at the [facility] for routine post-BMT followup.  As you know, Isael is a 22-year-old male with a history of multiply relapsed ALL.  He is now exactly 2 year status post umbilical cord blood transplant following ablative conditioning per protocol 2013-09.  He presents today via tele-conference with his mother, stepmother, father and himself through video to discuss recent laboratory visits.     Since I last visited with Isael approximately 12 months ago, he has really had no issues, no nausea, vomiting, diarrhea or constipation, no changes in his skin, no rashes, no fatigue, no dizziness, no stigmata of neurocutaneous diseases, and in fact has been doing well overall with good appetite and good energy level.     REVIEW OF SYSTEMS:  A complete 12-point review of systems is negative.     MEDICATIONS:      LABORATORY RESULTS:  Isael had a pretty extensive workup after this last bone marrow , which I am sure you will receive direct copies of, but essentially all tests are negative, showing no evidence of any sort of relapse. Flow cytometry was negative for malignancy.  His bone marrow biopsy was slightly hypocellular at 40% cellularity but had no morphologic evidence of malignancy.  He has FISH analysis showed no evidence of JAK2 rearrangement.  He B-cell rearrangement studies showed no evidence of clonality. No LP this year.     His engraftment in his marrow was 100% donor.  His engraftment in CD3 cells was 100% donor, in his engraftment in his peripheral CD33 cells is 100% donor.     The only test really outstanding at this point in time is clonalSEQ analysis, often performed for patient's after CAR-T therapy, which is still pending on Isael and often takes 4-8 weeks to return, but all the other tests, as mentioned above, are negative for any evidence of relapse. (2022 clonoseq = 0  clones)    Ferritin 1068.     ASSESSMENT AND PLAN:  In summary, Artemio is a 22-year-old male with a history of multiply relapsed acute lymphocytic leukemia now day 365 status post umbilical cord blood transplant per MAC following 2013-09 protocol.  His posttransplant complications included Clostridium difficile.  He has no signs of GVHD currently and is doing quite well.     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 Of note, his initial post CAR-T therapy was complicated by Grade III CRS necessitating Tociluzimab x3, dopamine pressor support, and steroids. No CRS, neurotoxicity or infectious complications from [place] CAR-T. He received preparative regimen per MT2013-09 with Thiotepa, Fludarabine, Busulfan, and ATG and received a UCBT on 2/16/21. He engrafted on day +20. His Day +21 peripheral VNTRs (3/9) and bone marrow chimerism (3/11) reveals 100% donor chimerism and his flow, morphology, and NGS is negative. FISH negative for ETV6 and JAK2.   His subsequent followup at day 180 had also shown 100% engraftment.     RECOMMENDATIONS:      1.  He may start re-vaccination at your facility. I do not think he has had any vaccination - we can send the catch up schedule.  2.  Of note, sperm was collected for fertility preservation.  3.  We will follow up on clonalSEQ when those results become available.  4.  We will discharge Artemio from our program, and all future followup can be performed at your institution, where I am sure he will do well.  I recommend a visit there in approximately 6 months' time just for a check-in to reestablish care and move forward with any recommendations you might have.   5. Ferritin is high, consider blood  letting, but at this level I am not sure it is needed.     It was a real pleasure to get to know this family and take care of Artemio.  Please do not hesitate with questions or concerns.      TOTAL TIME SPENT:  60 minutes, with another 60 minutes reviewing labs, charts, results, talking to colleagues on the same day of the visit.     Vinod Solomon MD

## 2023-03-01 NOTE — NURSING NOTE
Is the patient currently in the state of [place]? YES    Visit mode:TELEPHONE    If the visit is dropped, the patient can be reconnected by: TELEPHONE VISIT: Phone number: [phone]    Will anyone else be joining the visit? no      How would you like to obtain your AVS? MyChart    Are changes needed to the allergy or medication list? YES: Please remove meds marked not taking. Pt is taking a multivitamin.    Reason for visit:   Chief Complaint   Patient presents with     Telephone         Pt stated there are other people that would like to join the telephone call.  Please ask him about this.    Daphne David        
Yes, Non-Core measure site...

## 2023-03-08 ENCOUNTER — MEDICAL CORRESPONDENCE (OUTPATIENT)
Dept: TRANSPLANT | Facility: CLINIC | Age: 23
End: 2023-03-08
Payer: COMMERCIAL

## 2023-03-14 LAB — SCANNED LAB RESULT: NORMAL

## 2023-03-16 ENCOUNTER — TELEPHONE (OUTPATIENT)
Dept: TRANSPLANT | Facility: CLINIC | Age: 23
End: 2023-03-16
Payer: COMMERCIAL

## 2023-03-16 NOTE — TELEPHONE ENCOUNTER
Called Isael's mom to review the good Clonoseq results.  Mervat asked for a copy of results and coordinator sent through [employer].  She had no other questions at this time.

## 2023-03-22 NOTE — TELEPHONE ENCOUNTER
E-mail communications regarding search consent     From: Daria Jara   Sent: 2021 12:27 PM  To: Kamilah Rod [email or web address]>; Rocio Ann <[email or web address]>  Cc: [email or web address] <[email or web address]>  Subject: FW: Artemio Nino Consent    Artemio Nino  MRN: [id number]  : 2000    Artemio coronel signed search consent.    BARNDON Castillo, RN, CPHON, [facility]   Pediatric Blood and Marrow Transplant Nurse Coordinator  Office: [phone]  Fax: [phone]  Pager: [phone]    From: Camryn Meyer <[email or web address]   Sent: 2021 12:24 PM  To: Daria Jara <[email or web address]>  Cc: [email or web address]  Subject: Artemio Nino Consent    See attached. ThanksCamryn      --   Camryn Meyer RN, LSN, [place], [facility]  Licensed School Nurse  [facility] fax [phone]  [facility] fax [phone]  [email or web address]  Office [phone]  Mobile [phone] (Google Voice)       From: Daria Jara   Sent: 2020 12:48 PM  To: Camryn Meyer <[email or web address]  Subject: RE: MD Review    Jaime Cowan,   Attached is the search consent that I need Artemio to sign in 3 spots -    Patient    Signature of patient    If Artemio is the policy teixeira for insurance, he d also need to sign:    Policy teixeira    Guarantor signature    If you or someone else if the policy teixeira, can you or that someone else sign:    Policy teixeira    Guarantor signature    Once I have this back signed, I can formalize his search and place the chosen 7/8 umbilical cord on hold.  Thanks so much.   BRANDON Castillo, RN, CPHON, [facility]   Pediatric Blood and Marrow Transplant Nurse Coordinator  Office: [phone]  Fax: [phone]  Pager: [phone]    E-mail communications regarding PRA:    From: Karla Cagle  <[email or web address]>   Sent: Monday, January 11, 2021 12:44 PM  To: Daria Jara [email or web address]>  Cc: [email or web address]>  Subject: RE: MB lab requisition *no PHI*    The CRNs is good and I am on that distribution list.    [email or web address]    Karla Cagle RN, BSN, CPON  Immunotherapy Nurse Coordinator  [facility]  [facility] [place]  [phone] OFFICE  [phone] PAGER  [phone]  [email or web address]   OFFICE [address] [place] [address]  MAIL M/S [id number]  [email or web address] [email or web address]  COMPASSION  EXCELLENCE  INTEGRITY  COLLAB[employer]ATION [employer]QUITY [employer]ATION      CONFIDENTIALITY NOTICE: This e-mail message, including any attachments, is for the sole use of the intended recipient(s) and may contain confidential and privileged information protected by law. Any unauthorized review, use, disclosure or distribution is prohibited. If you are not the intended recipient, please contact the sender by reply e-mail and destroy all copies of the original message.    Confidential and Protected Quality Improvement RCW 4.24.50 and 70.41.200    From: Daria Jara <[email or web address]>   Sent: Monday, January 11, 2021 10:43 AM  To: Karla Cagle <[email or web address]>  Subject: RE: MB lab requisition *no PHI*    CAUTION: This email originated outside of this organization.   Jaime Acosta   Sorry about that.  Is there a good email address to reach them or do you prefer for requests to go through you?  Just don t want to bother you if I should be reaching out to someone else - thanks!    BRANDON Castillo, RN, CPHON, BMTCN   Pediatric Blood and Marrow Transplant Nurse Coordinator  Office: [phone]  Fax: [phone]  Pager: [phone]    From: Karla Cagle <[email or web address]>   Sent: Monday, January 11, 2021 12:40 PM  To: Daria Jara <[email or web address]>; Chloé Morales  <[email or web address]>  Cc: Saritha May <[email or web address]>  Subject: RE: MB lab requisition *no PHI*    Jaime Huff!  I sent along to the Clinical Research Nurses.  Thanks!    Karla Cagle RN, BSN, CPON  Immunotherapy Nurse Coordinator  [facility]  [facility]  [phone] OFFICE  [phone] PAGER  [phone]  [email or web address]   OFFICE [address] [place] [address]  MAIL M/S [id number]  WWW [facility]  COMPASSION  EXCELLENCE  INTEGRITY  COLLABORATION EQUITY [employer]OVATION      CONFIDENTIALITY NOTICE: This e-mail message, including any attachments, is for the sole use of the intended recipient(s) and may contain confidential and privileged information protected by law. Any unauthorized review, use, disclosure or distribution is prohibited. If you are not the intended recipient, please contact the sender by reply e-mail and destroy all copies of the original message.    Confidential and Protected Quality Improvement RCW 4.24.50 and 70.41.200    From: Daria Jara <[email or web address]>   Sent: Monday, January 11, 2021 10:37 AM  To: Chloé Morales <[email or web address]>; Karla Cagle <[email or web address]>  Cc: Saritha May <[email or web address]>  Subject: MB lab requisition *no PHI*    CAUTION: This email originated outside of this organization.   Jaime Luevano and Karla,  I hope this message finds you both well.  I was wondering if you could direct me to the appropriate person who might be able to help drawing/shipping a PRA sample on Artemio JERRY at his next clinic appointment?  I didn t fill out the requisition completely, omitting name and date of birth, so I could send the attachment without encryption, hoping it would be easier to open.  I realize plans are still being discussed, but we just want to make sure we have his donor source finalized if/when transplant is indicated.  Thank you for  your help!    Daria Jara, CLAYTONN, RN, CPHON, BMTCN   Pediatric Blood and Marrow Transplant Nurse Coordinator  [facility]  [address]  [place] [address]     Office: [phone]  Fax: [phone]  Pager: [phone]     no radiation

## 2023-04-05 LAB
ADDITIONAL COMMENTS: NORMAL
CULTURE HARVEST COMPLETE DATE: NORMAL
INTERPRETATION: NORMAL
ISCN: NORMAL
METHODS: NORMAL

## 2023-04-15 ENCOUNTER — HEALTH MAINTENANCE LETTER (OUTPATIENT)
Age: 23
End: 2023-04-15

## 2023-05-11 NOTE — PHARMACY-CONSULT NOTE
Post-BMT Immunization Consultation     I met with Artemio today to discuss the immunization schedule according to our Vaccine Administration Guidelines for Hematopoietic Cell Transplant Recipients.     The ablation a person's immunity caused by the HSCT will gradually remove immune memory from previous vaccination. Antibody titers to vaccine-preventable diseases decrease 1-4 years after autologous or allogeneic HSCT if the recipient is not revaccinated. HSCT recipients of all ages are at increased risk for certain vaccine-preventable diseases. Therefore it is recommended to re-vaccinate stem cell transplant patients for the following vaccine preventable diseases, even stem cell tranplant recipients who received vaccines prior to their HSCT.    Our guidelines follow the ACIP/CDC recommendations for altered immunocompetence/ hematopoetic stem cell transplant.     Artemio has not received any of his post-HSCT vaccinations due to previously taking Jakofi (ruxolitinib). It was recommended to wait until 6 months after the completion of Jakofi to start our standard HSCT vaccination schedule. Artemio is now > 6 months status post Jakafi therapy and is ready to initiate vaccinations.     Artemio completed his primary COVID Vaccine series in 2021. He received monovalent vaccine on 8/21/21, 10/15/21, and 11/6/21. He is eligible to receive one dose of bivalent mRNA vaccine to complete his COVID vaccination schedule.   Previously, it was recommended that SARS-CoV-2 vaccines should be administered alone, and at least 14 days separate from routine post-transplant vaccines. However, this restriction was recently lifted by the CDC and 14 days wait time is no longer needed between vaccinations.  We recommend all HCT and CAR T cell recipients receive the COVID vaccine series at 100 days post transplant, or when they are eligible based on age.    Artemio should continue to receive his annual influenza vaccine.     Given Artemio  is already 2 years post-HSCT, we can speed up his catch up immunization schedule. Below are my recommendations to minimize the amount of visits needed. Of note, we chose to delay initiation of live vaccines at his first visit today given he plans to travel to [place] tomorrow.     Proposed Date 5/12/23 6/12/23 7/12/23 9/12/23   Minimal Time between doses Today 4 weeks later 4 weeks later 8 weeks later   Recommended Vaccines -Pediarix   -ActHIB   -Prevnar 13   -Menveo   -Bexsero   -Gardasil 9   -COVID bivalent -Pediarix   -ActHIB   -Prevnar 13    -Bexsero   -Gardasil 9  -MMR II   -Varivax  -Pediarix  -ActHib  -Prevnar 13  -Menveo   -Pneumovax  -Gardasil 9   -MMR II   -Varivax          After these vaccines are completed Artemio will be up to date on post-HSCT immunizations.     Pharmacy will continue to follow.  Sherice Henson, AndreD, BCPPS

## 2023-05-12 ENCOUNTER — ALLIED HEALTH/NURSE VISIT (OUTPATIENT)
Dept: TRANSPLANT | Facility: CLINIC | Age: 23
End: 2023-05-12
Attending: PEDIATRICS
Payer: COMMERCIAL

## 2023-05-12 DIAGNOSIS — Z94.81 S/P ALLOGENEIC BONE MARROW TRANSPLANT (H): Primary | ICD-10-CM

## 2023-05-12 PROCEDURE — 90670 PCV13 VACCINE IM: CPT | Performed by: STUDENT IN AN ORGANIZED HEALTH CARE EDUCATION/TRAINING PROGRAM

## 2023-05-12 PROCEDURE — 90648 HIB PRP-T VACCINE 4 DOSE IM: CPT | Performed by: STUDENT IN AN ORGANIZED HEALTH CARE EDUCATION/TRAINING PROGRAM

## 2023-05-12 PROCEDURE — 250N000011 HC RX IP 250 OP 636: Performed by: STUDENT IN AN ORGANIZED HEALTH CARE EDUCATION/TRAINING PROGRAM

## 2023-05-12 PROCEDURE — 90471 IMMUNIZATION ADMIN: CPT | Performed by: STUDENT IN AN ORGANIZED HEALTH CARE EDUCATION/TRAINING PROGRAM

## 2023-05-12 PROCEDURE — 90651 9VHPV VACCINE 2/3 DOSE IM: CPT | Performed by: STUDENT IN AN ORGANIZED HEALTH CARE EDUCATION/TRAINING PROGRAM

## 2023-05-12 PROCEDURE — 250N000021 HC RX MED A9270 GY (STAT IND- M) 250: Performed by: STUDENT IN AN ORGANIZED HEALTH CARE EDUCATION/TRAINING PROGRAM

## 2023-05-12 PROCEDURE — G0009 ADMIN PNEUMOCOCCAL VACCINE: HCPCS | Performed by: STUDENT IN AN ORGANIZED HEALTH CARE EDUCATION/TRAINING PROGRAM

## 2023-05-12 PROCEDURE — 90734 MENACWYD/MENACWYCRM VACC IM: CPT | Performed by: STUDENT IN AN ORGANIZED HEALTH CARE EDUCATION/TRAINING PROGRAM

## 2023-05-12 PROCEDURE — 90620 MENB-4C VACCINE IM: CPT | Performed by: STUDENT IN AN ORGANIZED HEALTH CARE EDUCATION/TRAINING PROGRAM

## 2023-05-12 PROCEDURE — 90723 DTAP-HEP B-IPV VACCINE IM: CPT | Performed by: STUDENT IN AN ORGANIZED HEALTH CARE EDUCATION/TRAINING PROGRAM

## 2023-05-12 PROCEDURE — 91312 HC RX IP 250 OP 636: CPT | Performed by: STUDENT IN AN ORGANIZED HEALTH CARE EDUCATION/TRAINING PROGRAM

## 2023-05-12 PROCEDURE — 90472 IMMUNIZATION ADMIN EACH ADD: CPT | Performed by: STUDENT IN AN ORGANIZED HEALTH CARE EDUCATION/TRAINING PROGRAM

## 2023-05-12 PROCEDURE — 0124A HC ADMIN COVID VAC PFIZER 12+ BIVAL ADDITIONAL: CPT | Performed by: STUDENT IN AN ORGANIZED HEALTH CARE EDUCATION/TRAINING PROGRAM

## 2023-05-12 RX ADMIN — HUMAN PAPILLOMAVIRUS 9-VALENT VACCINE, RECOMBINANT 0.5 ML: 30; 40; 60; 40; 20; 20; 20; 20; 20 INJECTION, SUSPENSION INTRAMUSCULAR at 12:30

## 2023-05-12 RX ADMIN — PNEUMOCOCCAL 13-VALENT CONJUGATE VACCINE 0.5 ML: 2.2; 2.2; 2.2; 2.2; 2.2; 4.4; 2.2; 2.2; 2.2; 2.2; 2.2; 2.2; 2.2 INJECTION, SUSPENSION INTRAMUSCULAR at 15:37

## 2023-05-12 RX ADMIN — BNT162B2 ORIGINAL AND OMICRON BA.4/BA.5 30 MCG: .1125; .1125 INJECTION, SUSPENSION INTRAMUSCULAR at 12:30

## 2023-05-12 RX ADMIN — NEISSERIA MENINGITIDIS SEROGROUP B NHBA FUSION PROTEIN ANTIGEN, NEISSERIA MENINGITIDIS SEROGROUP B FHBP FUSION PROTEIN ANTIGEN AND NEISSERIA MENINGITIDIS SEROGROUP B NADA PROTEIN ANTIGEN 0.5 ML: 50; 50; 50; 25 INJECTION, SUSPENSION INTRAMUSCULAR at 12:30

## 2023-05-12 RX ADMIN — MENINGOCOCCAL (GROUPS A, C, Y AND W-135) OLIGOSACCHARIDE DIPHTHERIA CRM197 CONJUGATE VACCINE 0.5 ML: KIT at 12:30

## 2023-05-12 RX ADMIN — HAEMOPHILUS B POLYSACCHARIDE CONJUGATE VACCINE FOR INJ 0.5 ML: RECON SOLN at 12:30

## 2023-05-12 RX ADMIN — DIPHTHERIA AND TETANUS TOXOIDS AND ACELLULAR PERTUSSIS ADSORBED, HEPATITIS B (RECOMBINANT) AND INACTIVATED POLIOVIRUS VACCINE COMBINED 0.5 ML: 25; 10; 25; 25; 8; 10; 40; 8; 32 INJECTION, SUSPENSION INTRAMUSCULAR at 15:38

## 2023-06-12 ENCOUNTER — TELEPHONE (OUTPATIENT)
Dept: TRANSPLANT | Facility: CLINIC | Age: 23
End: 2023-06-12
Payer: COMMERCIAL

## 2023-06-12 NOTE — TELEPHONE ENCOUNTER
"Spoke with Audra regarding his second round of vaccines. We will schedule an appointment for him in two weeks to receive these vaccines. He is okay doing all of the ones scheduled and does not need to spread them out over multiple days. He felt \"basically fine\" after his last set of vaccines on 5/12.   "

## 2023-06-14 NOTE — PROGRESS NOTES
Pediatric BMT Daily Progress Note    Interval Events:  Tmax 101.2F, hemodynamically stable and clinically well. Mother verbally abusive to nurse again.     Review of Systems: Pertinent positives include those mentioned in interval events. A complete review of systems was performed and is otherwise negative.      Medications:  Please see MAR    Physical Exam:  Temp:  [99.1  F (37.3  C)-101.2  F (38.4  C)] 99.7  F (37.6  C)  Pulse:  [88-99] 88  Heart Rate:  [] 103  Resp:  [18-22] 20  BP: ()/(55-66) 96/62  SpO2:  [98 %-100 %] 100 %  I/O last 3 completed shifts:  In: 1969.6 [I.V.:520]  Out: 2525 [Urine:2525]  GEN: Lying in bed in NAD. Appears tired but pleasant and cooperative.  Mother present.   HEENT: Full head of hair, normocephalic, sclerae clear,  nares patent, OP with mild erythema and no visible lesions.  CARD: RRR, normal S1/S2 without murmur. Cap refill < 2 sec.   RESP: Lungs CTA bilaterally without adventitious lung sounds  ABD: Soft NT, ND, no organomegaly.  EXTREM: warm and well perfused.   SKIN: Pale throughout,  otherwise no notable rash  ACCESS: CVC and port    Labs:  Labs reviewed, pertinent findings WBC 0.1, Hgb 8.9, platelets 13K. 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 CD19 positive (dim).      Isael completed lymphodepleting chemotherapy prep as an outpatient. He was admitted for fever on 7/24, blood cultures no growth to date.  He is now day +9 s/p Kymriah infusion. Intermittent fevers, stable and doing well.     ALL and BMT: High risk B cell ALL (CNS negative) + JAK2 activation.  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  -  Completed apheresis collection in the [facility] (6/2020).   - He has been receiving bridging chemotherapy with oral 6MP, methotrexate and ruxolitinib. All oral chemotherapy stopped on 7/12.   - BM revealed 75% involvement with leukemia. CSF was negative.   - Outpatient lymphodepleting chemotherapy per [id number] with cytoxan and fludarabine 7/21-7/24 completed as an outpatient  - Tentative discharge date delayed until next week due to fever.    Kymriah infusion 7/27   - Pre-meds of Tylenol and Benadryl   - Flush completed.  - No steroid post kymriah unless needed for severe blood product transfusion reaction.      BMT Daily CARTOX Note (complete days 0-14 and with any subsequent CRS/neurotoxicity)    Date: August 5, 2020    Artemio Nino ([id number]) is a 20 year old year old male who received infusion on 7/27/20, currently day +9 of CAR-T cell therapy Kymriah    Vital Signs: BP 96/62   Pulse 88   Temp 99.7  F (37.6  C) (Oral)   Resp 20   Wt 59 kg (130 lb 1.1 oz)   SpO2 100%   BMI 19.16 kg/m      Organ CAR-T toxicity:    Cardiac: No   Pulmonary: No   Renal: No   Liver: No   Coagulopathy: No   Neurologic: No   Immune: No    Overall CRS grade Grade 1    CRS Parameter Grade 1 Grade 2 Grade 3 Grade 4   Fever* Tm >= 100.4 degrees F Tm >= 100.4 degrees F Tm >= 100.4 degrees F Tm >= to 100.4 degrees F       With Either:  Hypotension None Responsive to Fluids Requiring 1 vasopressor (w/ or w/o vasopressin) Requiring multiple vasopressors (excluding vasopressin)     And/or  Hypoxia None Low-flow nasal cannula or blow-by High-flow nasal cannula, face mask, non-rebreather mask, or Venturi mask Requiring positive pressure (CPAP, BiPAP intubation and mechanical ventilation)       * Definition of High Dose Pressors for Grade 4  Vasopressor Duration >= 3 hours   Norepinephrine monotherapy >= 20 mcg/kg/minute   Dopamine monotherapy >= 10 mcg/kg/minute   Phenylephrine monotherapy >= 200 mcg/minute   Epinephrine  monotherapy >= 10 mcg/minute   If on vasopressin + another agent High dose of vasopressin + norepi equivalent (using VASST formula below) is >= 10 mcg/minute   If on combination vasopressors, not including vasopressin Norepi equivalent of > 20 mcg/minute (using VASST formula below)     VASST formula: Norepi equivalent dose = [NE (mcg/min)] + [dopamine (mcg/min) / 2] + [epinephrine (mcg/min)] + [phenylephrine (mcg/min) / 10]    ICE Assessment  Orientation to year, month, city, hospital: 4 points  Name 3 objects (e.g., point to clock, pen, button): 3 points  Following commands: (e.g., Show me 2 fingers): 1 point   Write a standard sentence (e.g., The pickett jumped over the log): 1 point   Count backwards from 100 by ten: 1 point  Total points: 10: No impairment    CRS Summary  Does patient have CRS? Yes, date of onset: 7/28/20  Current CRS rdgrdrrdarddrderd:rd rd3rd What is the maximum severity to date: Grade 2 (fever and hypotension)  What therapy was given: Antibiotics IV  Has CRS grade changed? Yes, previously 0  Has CRS resolved? No    Neurotoxicity Summary  Does patient have neurotoxicity? No  Current Neurotoxicity score (0-10): 0  What is the maximum severity to date:  0  What therapy was given: No  Has neurotoxicity resolved? N/A    FEN/Renal:   # Risk for malnutrition: Appetite remains decreased,  starting to eat small amounts and is drinking now also.  - Continue nutritional support with TPN/IL  - Age appropriate diet   - Monitor nutritional intake     # Risk for electrolyte abnormalities:  - Check daily electrolytes     # Risk for renal dysfunction/risk for ELIZ secondary to potential tumor lysis with preparatory chemotherapy: GFR 133ml/min  - Daily electrolyte monitoring  - Monitor daily weights     # Risk for TLS: Uric Acid 1.4 today.  - Discontinue Allopurinol   - Daily TLS labs     Pulmonary:  # Risk for pulmonary insufficiency: No current concerns. Normal chest and sinus CTs during work-up.      Cardiovascular:  #  Aaron-Parkinson-White Syndrome: diagnosed upon syncope in 02/2018. Asymptomatic, no interventions to date. No current concerns.  - EKG 7/15 revealed WPW with normal sinus rhythm.   - Echocardiogram 7/15 showed LVEF of 62%  - Cardiology to decide if they want to assess him.     Heme:   # Pancytopenia secondary to chemotherapy:  - Transfuse for hemoglobin < 7, platelets < 10,000   -  Mild hives noted at end of pRBC transfusion on 7/25. Premedicate with Tylenol and Benadryl for pRBC transfusions.   - Recent history of significant hives with platelet transfusions. Was being premedicated with hydrocortisone, tylenol, and benadryl for platelets. However, no hydrocortisone premedication ordered now given CAR-T. If severe reaction then ok to give steroids per Dr. Zavala.   - No GCSF per protocol     Infectious Disease:  # Recent Febrile Neutropenia: Admitted for fever. Remains afebrile. S/p vancomycin.  Continue Cefepime and monitor blood cultures. Blood cultures have remained negative or NGTD.   - Covid negative 7/17 and 7/24     # Risk for infection given immunocompromised status:    Active: See above  Prophylaxis:                                                                      - Viral prophylaxis: HD Valtrex continue through Day +30  - Fungal prophylaxis: Micafungin continue through Day +30. History of photoxic drug eruption with voriconazole, avoid use.   - Bacterial prophylaxis: None  - PCP prophylaxis: Bactrim to begin day +28      GI:   # Nausea:  - Scheduled medications: Kytril BID - discontinued per patient request 7/26  - PRN medications: Benadryl PRN     # Acid reflux:  Protonix once daily. Continue TUMS prn.     # Recent Transaminitis:  Possibly related to recent chemotherapy. May warrant RUQ US to further assess persistent transamnitis.       Neuro:  # Mucositis/pain: Throat pain intermittent, improving.   - Oxycodone PRN available, has not used  - Tramadol prn  - Covid negative 7/17 and 7/24     Oral  Hygiene:   # Receding gums, risk for infection  - Continue chlorhexidine mouth wash for symptomatic control, pain now improved     Derm:  No current rash. He is no longer using creams.     Fertility:  Sperm collected pre chemotherapy for fertility preservation      Discharge Considerations: Expected lengths of hospitalization for patients with complications from stem cell transplant vary based on the complication(s) and severity(ies). A typical stay is 7-14 days.     The above plan of care was developed by and communicated to me by the Pediatric BMT attending physician, MD Candido Tai PA-C  Pediatric Blood and Marrow Transplant Program  [facility]      Pediatric BMT Inpatient Attending Note:    Artemio was seen and evaluated by me today.       The significant interval history includes: continues to do overall well, developed fever overnight, Tmax 101.2F. Other vitals stable, no transfusions today. Blood cultures continue to be negative. We will continue to monitor him closely for a clinical change for concerns of tumor lysis and cytokine release syndrome as he is in the high risk period. His mouth/throat pain is stable. He continues to take little orally. He remains neutropenic.  We will maintain his antibiotics and TPN.      I have reviewed the vital signs, medications and lab results.  I assisted in formulating a plan, which was instituted by the BMT team. The total amount of time spent in the admission and care of Artemio Nino today was >35 minutes, at least 50% of which was counseling and coordination of care.     Sid Girard MD    Pediatric Blood and Marrow Transplant   [facility]  Pager: [phone]         Patient Active Problem List   Diagnosis     Acute lymphoblastic leukemia (ALL) in remission (H)     Aaron-Parkinson-White (WPW) syndrome     Bone marrow transplant candidate     ALL (acute  lymphoblastic leukemia of infant) (H)     At risk for infection     S/P allogeneic bone marrow transplant (H)     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      PAST SURGICAL HISTORY:  H/O  section     H/O right inguinal hernia repair     H/O sinus surgery     History of D&C

## 2023-06-28 ENCOUNTER — ALLIED HEALTH/NURSE VISIT (OUTPATIENT)
Dept: TRANSPLANT | Facility: CLINIC | Age: 23
End: 2023-06-28
Attending: PEDIATRICS
Payer: COMMERCIAL

## 2023-06-28 VITALS — TEMPERATURE: 98.4 F

## 2023-06-28 DIAGNOSIS — Z94.81 S/P ALLOGENEIC BONE MARROW TRANSPLANT (H): Primary | ICD-10-CM

## 2023-06-28 PROCEDURE — 90723 DTAP-HEP B-IPV VACCINE IM: CPT | Performed by: PEDIATRICS

## 2023-06-28 PROCEDURE — 250N000011 HC RX IP 250 OP 636: Performed by: PEDIATRICS

## 2023-06-28 PROCEDURE — 90710 MMRV VACCINE SC: CPT | Performed by: PEDIATRICS

## 2023-06-28 PROCEDURE — 90670 PCV13 VACCINE IM: CPT | Performed by: PEDIATRICS

## 2023-06-28 PROCEDURE — G0009 ADMIN PNEUMOCOCCAL VACCINE: HCPCS | Performed by: PEDIATRICS

## 2023-06-28 PROCEDURE — 250N000021 HC RX MED A9270 GY (STAT IND- M) 250: Performed by: PEDIATRICS

## 2023-06-28 PROCEDURE — 90651 9VHPV VACCINE 2/3 DOSE IM: CPT | Performed by: PEDIATRICS

## 2023-06-28 PROCEDURE — 90472 IMMUNIZATION ADMIN EACH ADD: CPT | Performed by: PEDIATRICS

## 2023-06-28 PROCEDURE — 90648 HIB PRP-T VACCINE 4 DOSE IM: CPT | Performed by: PEDIATRICS

## 2023-06-28 PROCEDURE — 90620 MENB-4C VACCINE IM: CPT | Performed by: PEDIATRICS

## 2023-06-28 RX ADMIN — HAEMOPHILUS B POLYSACCHARIDE CONJUGATE VACCINE FOR INJ 0.5 ML: RECON SOLN at 12:12

## 2023-06-28 RX ADMIN — PNEUMOCOCCAL 13-VALENT CONJUGATE VACCINE 0.5 ML: 2.2; 2.2; 2.2; 2.2; 2.2; 4.4; 2.2; 2.2; 2.2; 2.2; 2.2; 2.2; 2.2 INJECTION, SUSPENSION INTRAMUSCULAR at 12:10

## 2023-06-28 RX ADMIN — DIPHTHERIA AND TETANUS TOXOIDS AND ACELLULAR PERTUSSIS ADSORBED, HEPATITIS B (RECOMBINANT) AND INACTIVATED POLIOVIRUS VACCINE COMBINED 0.5 ML: 25; 10; 25; 25; 8; 10; 40; 8; 32 INJECTION, SUSPENSION INTRAMUSCULAR at 12:12

## 2023-06-28 RX ADMIN — NEISSERIA MENINGITIDIS SEROGROUP B NHBA FUSION PROTEIN ANTIGEN, NEISSERIA MENINGITIDIS SEROGROUP B FHBP FUSION PROTEIN ANTIGEN AND NEISSERIA MENINGITIDIS SEROGROUP B NADA PROTEIN ANTIGEN 0.5 ML: 50; 50; 50; 25 INJECTION, SUSPENSION INTRAMUSCULAR at 12:15

## 2023-06-28 RX ADMIN — HUMAN PAPILLOMAVIRUS 9-VALENT VACCINE, RECOMBINANT 0.5 ML: 30; 40; 60; 40; 20; 20; 20; 20; 20 INJECTION, SUSPENSION INTRAMUSCULAR at 12:14

## 2023-06-28 RX ADMIN — MEASLES, MUMPS, RUBELLA AND VARICELLA VIRUS VACCINE LIVE 0.5 ML: 1000; 20000; 1000; 9772 INJECTION, POWDER, LYOPHILIZED, FOR SUSPENSION SUBCUTANEOUS at 12:13

## 2023-06-28 NOTE — NURSING NOTE
Chief Complaint   Patient presents with     Allied Health Visit     Vaccines     Temp 98.4  F (36.9  C) (Oral)     Patient presented today and received the following vaccines:    meningococcal vaccine B (Recomb)- IM 0.5mL Left deltoid (top)  Human papillomavirus vaccine recombinant (Gardasil 9 valent)- IM 0.5mL Left deltoid (bottom)  Measles-mumps-rubella-varicella (Proquad)- Subq 0.5 mL back of left upper arm  Haemophilus B polysac-tetanus- IM 0.5mL Right deltoid (top)  DTaP-hepatitis B [id number] (Pediarix)- IM 0.5 mL Right deltoid (middle)  Pneumococcal (Prevnar 13)- IM 0.5mL Right deltoid (bottom)    Cyndi Choudhury LPN  June 28, 2023

## 2023-09-18 ENCOUNTER — ALLIED HEALTH/NURSE VISIT (OUTPATIENT)
Dept: TRANSPLANT | Facility: CLINIC | Age: 23
End: 2023-09-18
Attending: PEDIATRICS
Payer: COMMERCIAL

## 2023-09-18 DIAGNOSIS — Z94.81 S/P ALLOGENEIC BONE MARROW TRANSPLANT (H): Primary | ICD-10-CM

## 2023-09-18 PROCEDURE — G0009 ADMIN PNEUMOCOCCAL VACCINE: HCPCS | Performed by: PEDIATRICS

## 2023-09-18 PROCEDURE — 90710 MMRV VACCINE SC: CPT | Performed by: PEDIATRICS

## 2023-09-18 PROCEDURE — 90472 IMMUNIZATION ADMIN EACH ADD: CPT | Performed by: PEDIATRICS

## 2023-09-18 PROCEDURE — 90471 IMMUNIZATION ADMIN: CPT | Performed by: PEDIATRICS

## 2023-09-18 PROCEDURE — 250N000011 HC RX IP 250 OP 636: Performed by: PEDIATRICS

## 2023-09-18 PROCEDURE — 90651 9VHPV VACCINE 2/3 DOSE IM: CPT | Performed by: PEDIATRICS

## 2023-09-18 PROCEDURE — 250N000021 HC RX MED A9270 GY (STAT IND- M) 250: Performed by: PEDIATRICS

## 2023-09-18 PROCEDURE — 90732 PPSV23 VACC 2 YRS+ SUBQ/IM: CPT | Performed by: PEDIATRICS

## 2023-09-18 RX ADMIN — PNEUMOCOCCAL VACCINE POLYVALENT 0.5 ML
25; 25; 25; 25; 25; 25; 25; 25; 25; 25; 25; 25; 25; 25; 25; 25; 25; 25; 25; 25; 25; 25; 25 INJECTION, SOLUTION INTRAMUSCULAR; SUBCUTANEOUS at 13:48

## 2023-09-18 RX ADMIN — MEASLES, MUMPS, RUBELLA AND VARICELLA VIRUS VACCINE LIVE 0.5 ML: 1000; 20000; 1000; 9772 INJECTION, POWDER, LYOPHILIZED, FOR SUSPENSION SUBCUTANEOUS at 13:40

## 2023-09-18 RX ADMIN — HUMAN PAPILLOMAVIRUS 9-VALENT VACCINE, RECOMBINANT 0.5 ML: 30; 40; 60; 40; 20; 20; 20; 20; 20 INJECTION, SUSPENSION INTRAMUSCULAR at 13:49

## 2023-11-03 NOTE — PROGRESS NOTES
No care due was identified.  [facility] Embedded Care Due Messages. Reference number: [id number].   11/03/2023 12:11:12 AM CDT   Pt has been afebrile, AVS stable and within parameter. Lung sounds clear. No complaints of pain or nausea; eating well, drinking well. Adequate UOP. No further issues.

## 2024-03-21 NOTE — PROCEDURES
[facility], [place]    Procedure: IR Procedure Note    Date/Time: 6/12/2020 3:41 PM  Performed by: Link Rios PA-C  Authorized by: Link Rios PA-C     UNIVERSAL PROTOCOL   Site Marked: NA  Prior Images Obtained and Reviewed:  Yes  Required items: Required blood products, implants, devices and special equipment available    Patient identity confirmed:  Verbally with patient, arm band, provided demographic data and hospital-assigned identification number  Patient was reevaluated immediately before administering moderate or deep sedation or anesthesia  Confirmation Checklist:  Patient's identity using two indicators, relevant allergies, procedure was appropriate and matched the consent or emergent situation and correct equipment/implants were available  Time out: Immediately prior to the procedure a time out was called    Universal Protocol: the Joint Commission Universal Protocol was followed    Preparation: Patient was prepped and draped in usual sterile fashion    ESBL (mL):  0         ANESTHESIA    Anesthesia: Local infiltration  Local Anesthetic:  Lidocaine 1% without epinephrine      SEDATION    Patient Sedated: No    See dictated procedure note for full details.  Findings: Existing left internal jugular, 11.5 Fr., 20 cm., dual lumen non-tunneled central venous catheter removed intact and without difficulty. No immediate complications.    Specimens: none    Complications: None    Condition: Stable    Plan: Upright for 2 hours, no strenuous activity for 3 days. Follow up per primary team.    PROCEDURE   Patient Tolerance:  Patient tolerated the procedure well with no immediate complications    Length of time physician/provider present for 1:1 monitoring during sedation: 0       show

## 2024-06-14 NOTE — NURSING NOTE
"Chief Complaint   Patient presents with     RECHECK     Patient here today ear pain     /71 (BP Location: Left arm, Patient Position: Sitting, Cuff Size: Adult Regular)   Pulse 99   Temp 99  F (37.2  C) (Oral)   Resp 16   Ht 1.751 m (5' 8.94\")   Wt 62.4 kg (137 lb 9.1 oz)   SpO2 97%   BMI 20.35 kg/m      Mild Pain (3)  Data Unavailable    I have reviewed the patients medication and allergy list.    Patient needs refills: no    Dressing change needed? No    EKG needed? No    Zhane Frost, [facility]  April 20, 2021  " From: Laney Pineda  To: Dr. Trav Sotelo  Sent: 6/14/2024 11:21 AM EDT  Subject: Accommodation for work    Upon my return to work I have noticed that some actions hurt pretty bad or I can’t really do them at all. I talked to my HR on site and they stated that I would need a letter from my doctor stating accommodations so I can still work. I was told that these would have to be written specifically. Just as a reminder I work for [employer].   I’m having trouble with the extended walking and climbing stairs. My job requires a lot of walking and my shifts are 10 hours a day. I was told that the best way to state my accommodations would be in percentage form.(for example I can walk 60% and climb stairs 30% of my working day.) I don’t mind doing this difficult movements as long as they are limited until my body adjusts. 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

## 2024-06-22 ENCOUNTER — HEALTH MAINTENANCE LETTER (OUTPATIENT)
Age: 24
End: 2024-06-22

## 2025-07-12 ENCOUNTER — HEALTH MAINTENANCE LETTER (OUTPATIENT)
Age: 25
End: 2025-07-12

## (undated) DEVICE — DRSG PRIMAPORE 02X3" 7133

## (undated) DEVICE — SOL WATER IRRIG 1000ML BOTTLE 2F7114

## (undated) DEVICE — DRSG STERI STRIP 1/4X3" R1541

## (undated) DEVICE — ENDO BITE BLOCK PEDS BATRIK LATEX FREE B1

## (undated) DEVICE — SYR 10ML FINGER CONTROL W/O NDL 309695

## (undated) DEVICE — PAD CHUX UNDERPAD 30X30"

## (undated) DEVICE — GLOVE PROTEXIS W/NEU-THERA 6.5  2D73TE65

## (undated) DEVICE — PREP CHLORAPREP 26ML TINTED ORANGE  260815

## (undated) DEVICE — PAD CHUX UNDERPAD 30X36" P3036C

## (undated) DEVICE — DRSG GAUZE 2X2" 8042

## (undated) DEVICE — ENDO TUBING W/CAP AUXILARY WATER INLET 100609 EGA-500

## (undated) DEVICE — DRSG GAUZE 4X4" TRAY 6939

## (undated) DEVICE — TUBING SUCTION MEDI-VAC 1/4"X20' N620A

## (undated) DEVICE — PREP CHLORAPREP CLEAR 3ML 260400

## (undated) DEVICE — ENDO FORCEP ENDOJAW BIOPSY 2.8MMX230CM FB-220U

## (undated) DEVICE — NDL BONE MARROW BIOPSY SNARECOIL 11GAX4' RBN-114 74050-01M

## (undated) DEVICE — DRSG GAUZE 4X4" 3033

## (undated) DEVICE — LINEN TOWEL PACK X2 WHITE 5486

## (undated) DEVICE — NDL BX BONE MARROW SNARECOIL 11GA X 4" RBN-114

## (undated) DEVICE — GLOVE PROTEXIS W/NEU-THERA 6.0  2D73TE60

## (undated) DEVICE — LINEN GOWN LG 5406

## (undated) DEVICE — TRAY LUMBAR PUNCTURE ADULT 4301C

## (undated) DEVICE — KIT CONNECTOR FOR OLYMPUS ENDOSCOPES DEFENDO 100310

## (undated) DEVICE — SPECIMEN CONTAINER W/20ML 10% BUFF FORMALIN C4322-11

## (undated) DEVICE — NDL SPINAL 22GA 3.5" QUINCKE 405181

## (undated) DEVICE — NDL 25GA 5/8" 305122

## (undated) DEVICE — SOL ISOPROPYL RUBBING ALCOHOL USP 70% 4OZ HDX-20 I0020

## (undated) DEVICE — PEN MARKING SKIN W/LABELS 31145918

## (undated) DEVICE — DECANTER TRANSFER DEVICE 2008S

## (undated) DEVICE — SYR 10ML BLUNT CANNULA

## (undated) DEVICE — SYR 10ML LL W/O NDL

## (undated) DEVICE — NDL 21GA 1.5"

## (undated) DEVICE — DRSG BANDAID SPOT .875" PLASTIC SHEER 44120

## (undated) DEVICE — GLOVE PROTEXIS W/NEU-THERA 7.5  2D73TE75

## (undated) DEVICE — GLOVE PROTEXIS W/NEU-THERA 8.0  2D73TE80

## (undated) DEVICE — TRAY BONE MARROW BIOPSY ASC 640 31-0097A

## (undated) DEVICE — SYR 30ML LL W/O NDL 302832

## (undated) DEVICE — SU MONOCRYL 4-0 P-3 18" UND Y494G

## (undated) DEVICE — SYR 20ML LL W/O NDL 302830

## (undated) DEVICE — NDL 25GA 2"  8881200441

## (undated) DEVICE — DERMABOND

## (undated) DEVICE — SOL ADH LIQUID BENZOIN SWAB 0.6ML C1544

## (undated) DEVICE — NDL BX BONE MARROW 15GA X 2.8" RAN-1528

## (undated) DEVICE — SYR 10ML LL W/O NDL 302995

## (undated) DEVICE — LINEN GOWN X4 5410

## (undated) DEVICE — Device

## (undated) DEVICE — BAG SPECIMEN TRANSPORT 6X9" CH6X9CL

## (undated) DEVICE — LINEN ORTHO PACK 5446

## (undated) DEVICE — NDL BLUNT 18GA 1" W/O FILTER 305181

## (undated) DEVICE — BLADE KNIFE SURG 11 371111

## (undated) DEVICE — SOL NACL 0.9% IRRIG 1000ML BOTTLE 2F7124

## (undated) DEVICE — SU VICRYL 4-0 PS-2 18" UND J496H

## (undated) DEVICE — SU VICRYL 3-0 SH 27" J316H

## (undated) DEVICE — DRAPE LAP W/ARMBOARD 29410

## (undated) DEVICE — SUCTION MANIFOLD DORNOCH ULTRA CART UL-CL500

## (undated) DEVICE — NDL BONE MARROW ASPIRATION 15GA 2.8" RAN-1528

## (undated) DEVICE — NDL SPINAL 22GA 2.5" QUINCKE 405074

## (undated) DEVICE — LINEN TOWEL PACK X5 5464

## (undated) DEVICE — KIT ENDO TURNOVER/PROCEDURE CARRY-ON 101822

## (undated) RX ORDER — LIDOCAINE HYDROCHLORIDE 10 MG/ML
INJECTION, SOLUTION EPIDURAL; INFILTRATION; INTRACAUDAL; PERINEURAL
Status: DISPENSED
Start: 2018-10-26

## (undated) RX ORDER — PHENYLEPHRINE HCL IN 0.9% NACL 1 MG/10 ML
SYRINGE (ML) INTRAVENOUS
Status: DISPENSED
Start: 2020-06-10

## (undated) RX ORDER — FENTANYL CITRATE-0.9 % NACL/PF 10 MCG/ML
PLASTIC BAG, INJECTION (ML) INTRAVENOUS
Status: DISPENSED
Start: 2020-09-25

## (undated) RX ORDER — ONDANSETRON 2 MG/ML
INJECTION INTRAMUSCULAR; INTRAVENOUS
Status: DISPENSED
Start: 2020-06-10

## (undated) RX ORDER — FENTANYL CITRATE 50 UG/ML
INJECTION, SOLUTION INTRAMUSCULAR; INTRAVENOUS
Status: DISPENSED
Start: 2018-11-27

## (undated) RX ORDER — PROPOFOL 10 MG/ML
INJECTION, EMULSION INTRAVENOUS
Status: DISPENSED
Start: 2021-08-11

## (undated) RX ORDER — PROPOFOL 10 MG/ML
INJECTION, EMULSION INTRAVENOUS
Status: DISPENSED
Start: 2018-11-27

## (undated) RX ORDER — EPHEDRINE SULFATE 50 MG/ML
INJECTION, SOLUTION INTRAMUSCULAR; INTRAVENOUS; SUBCUTANEOUS
Status: DISPENSED
Start: 2020-06-10

## (undated) RX ORDER — FENTANYL CITRATE 50 UG/ML
INJECTION, SOLUTION INTRAMUSCULAR; INTRAVENOUS
Status: DISPENSED
Start: 2019-11-07

## (undated) RX ORDER — ONDANSETRON 2 MG/ML
INJECTION INTRAMUSCULAR; INTRAVENOUS
Status: DISPENSED
Start: 2019-05-16

## (undated) RX ORDER — LIDOCAINE HYDROCHLORIDE 10 MG/ML
INJECTION, SOLUTION EPIDURAL; INFILTRATION; INTRACAUDAL; PERINEURAL
Status: DISPENSED
Start: 2019-05-16

## (undated) RX ORDER — FENTANYL CITRATE 50 UG/ML
INJECTION, SOLUTION INTRAMUSCULAR; INTRAVENOUS
Status: DISPENSED
Start: 2019-05-16

## (undated) RX ORDER — HEPARIN SODIUM (PORCINE) LOCK FLUSH IV SOLN 100 UNIT/ML 100 UNIT/ML
SOLUTION INTRAVENOUS
Status: DISPENSED
Start: 2020-06-10

## (undated) RX ORDER — CEFAZOLIN SODIUM 1 G/3ML
INJECTION, POWDER, FOR SOLUTION INTRAMUSCULAR; INTRAVENOUS
Status: DISPENSED
Start: 2020-06-10

## (undated) RX ORDER — PHENYLEPHRINE HCL IN 0.9% NACL 1 MG/10 ML
SYRINGE (ML) INTRAVENOUS
Status: DISPENSED
Start: 2019-05-16

## (undated) RX ORDER — FENTANYL CITRATE-0.9 % NACL/PF 10 MCG/ML
PLASTIC BAG, INJECTION (ML) INTRAVENOUS
Status: DISPENSED
Start: 2021-05-21

## (undated) RX ORDER — PROPOFOL 10 MG/ML
INJECTION, EMULSION INTRAVENOUS
Status: DISPENSED
Start: 2019-05-16

## (undated) RX ORDER — FENTANYL CITRATE 50 UG/ML
INJECTION, SOLUTION INTRAMUSCULAR; INTRAVENOUS
Status: DISPENSED
Start: 2021-03-11

## (undated) RX ORDER — LIDOCAINE HYDROCHLORIDE 10 MG/ML
INJECTION, SOLUTION EPIDURAL; INFILTRATION; INTRACAUDAL; PERINEURAL
Status: DISPENSED
Start: 2020-06-10

## (undated) RX ORDER — HEPARIN SODIUM,PORCINE 10 UNIT/ML
VIAL (ML) INTRAVENOUS
Status: DISPENSED
Start: 2020-07-16

## (undated) RX ORDER — FENTANYL CITRATE 50 UG/ML
INJECTION, SOLUTION INTRAMUSCULAR; INTRAVENOUS
Status: DISPENSED
Start: 2021-08-11

## (undated) RX ORDER — ONDANSETRON 2 MG/ML
INJECTION INTRAMUSCULAR; INTRAVENOUS
Status: DISPENSED
Start: 2021-08-11

## (undated) RX ORDER — HEPARIN SODIUM,PORCINE 10 UNIT/ML
VIAL (ML) INTRAVENOUS
Status: DISPENSED
Start: 2018-10-26

## (undated) RX ORDER — CEFAZOLIN SODIUM 1 G/3ML
INJECTION, POWDER, FOR SOLUTION INTRAMUSCULAR; INTRAVENOUS
Status: DISPENSED
Start: 2021-02-08

## (undated) RX ORDER — HEPARIN SODIUM,PORCINE 10 UNIT/ML
VIAL (ML) INTRAVENOUS
Status: DISPENSED
Start: 2018-10-23

## (undated) RX ORDER — FENTANYL CITRATE 50 UG/ML
INJECTION, SOLUTION INTRAMUSCULAR; INTRAVENOUS
Status: DISPENSED
Start: 2020-06-10

## (undated) RX ORDER — FENTANYL CITRATE 50 UG/ML
INJECTION, SOLUTION INTRAMUSCULAR; INTRAVENOUS
Status: DISPENSED
Start: 2021-04-09

## (undated) RX ORDER — FENTANYL CITRATE 50 UG/ML
INJECTION, SOLUTION INTRAMUSCULAR; INTRAVENOUS
Status: DISPENSED
Start: 2020-08-24

## (undated) RX ORDER — LIDOCAINE HYDROCHLORIDE 20 MG/ML
INJECTION, SOLUTION EPIDURAL; INFILTRATION; INTRACAUDAL; PERINEURAL
Status: DISPENSED
Start: 2021-08-11

## (undated) RX ORDER — HEPARIN SODIUM (PORCINE) LOCK FLUSH IV SOLN 100 UNIT/ML 100 UNIT/ML
SOLUTION INTRAVENOUS
Status: DISPENSED
Start: 2018-10-23

## (undated) RX ORDER — HEPARIN SODIUM,PORCINE 10 UNIT/ML
VIAL (ML) INTRAVENOUS
Status: DISPENSED
Start: 2021-02-08

## (undated) RX ORDER — FENTANYL CITRATE 50 UG/ML
INJECTION, SOLUTION INTRAMUSCULAR; INTRAVENOUS
Status: DISPENSED
Start: 2023-02-22

## (undated) RX ORDER — FENTANYL CITRATE 50 UG/ML
INJECTION, SOLUTION INTRAMUSCULAR; INTRAVENOUS
Status: DISPENSED
Start: 2021-05-21

## (undated) RX ORDER — EPHEDRINE SULFATE 50 MG/ML
INJECTION, SOLUTION INTRAMUSCULAR; INTRAVENOUS; SUBCUTANEOUS
Status: DISPENSED
Start: 2023-02-22

## (undated) RX ORDER — EPHEDRINE SULFATE 50 MG/ML
INJECTION, SOLUTION INTRAMUSCULAR; INTRAVENOUS; SUBCUTANEOUS
Status: DISPENSED
Start: 2021-08-11

## (undated) RX ORDER — HEPARIN SODIUM (PORCINE) LOCK FLUSH IV SOLN 100 UNIT/ML 100 UNIT/ML
SOLUTION INTRAVENOUS
Status: DISPENSED
Start: 2020-07-16

## (undated) RX ORDER — EPHEDRINE SULFATE 50 MG/ML
INJECTION, SOLUTION INTRAMUSCULAR; INTRAVENOUS; SUBCUTANEOUS
Status: DISPENSED
Start: 2019-05-16

## (undated) RX ORDER — PROPOFOL 10 MG/ML
INJECTION, EMULSION INTRAVENOUS
Status: DISPENSED
Start: 2020-08-24

## (undated) RX ORDER — FENTANYL CITRATE 50 UG/ML
INJECTION, SOLUTION INTRAMUSCULAR; INTRAVENOUS
Status: DISPENSED
Start: 2021-02-08

## (undated) RX ORDER — FENTANYL CITRATE 50 UG/ML
INJECTION, SOLUTION INTRAMUSCULAR; INTRAVENOUS
Status: DISPENSED
Start: 2018-10-26

## (undated) RX ORDER — ONDANSETRON 2 MG/ML
INJECTION INTRAMUSCULAR; INTRAVENOUS
Status: DISPENSED
Start: 2018-11-27

## (undated) RX ORDER — HEPARIN SODIUM,PORCINE 10 UNIT/ML
VIAL (ML) INTRAVENOUS
Status: DISPENSED
Start: 2020-07-20

## (undated) RX ORDER — FENTANYL CITRATE-0.9 % NACL/PF 10 MCG/ML
PLASTIC BAG, INJECTION (ML) INTRAVENOUS
Status: DISPENSED
Start: 2021-08-11

## (undated) RX ORDER — FENTANYL CITRATE 50 UG/ML
INJECTION, SOLUTION INTRAMUSCULAR; INTRAVENOUS
Status: DISPENSED
Start: 2019-02-08

## (undated) RX ORDER — HEPARIN SODIUM (PORCINE) LOCK FLUSH IV SOLN 100 UNIT/ML 100 UNIT/ML
SOLUTION INTRAVENOUS
Status: DISPENSED
Start: 2018-10-26

## (undated) RX ORDER — EPHEDRINE SULFATE 50 MG/ML
INJECTION, SOLUTION INTRAMUSCULAR; INTRAVENOUS; SUBCUTANEOUS
Status: DISPENSED
Start: 2020-09-25

## (undated) RX ORDER — LIDOCAINE HYDROCHLORIDE 20 MG/ML
INJECTION, SOLUTION EPIDURAL; INFILTRATION; INTRACAUDAL; PERINEURAL
Status: DISPENSED
Start: 2019-05-16

## (undated) RX ORDER — GLYCOPYRROLATE 0.2 MG/ML
INJECTION INTRAMUSCULAR; INTRAVENOUS
Status: DISPENSED
Start: 2021-08-11

## (undated) RX ORDER — ONDANSETRON 2 MG/ML
INJECTION INTRAMUSCULAR; INTRAVENOUS
Status: DISPENSED
Start: 2020-09-25

## (undated) RX ORDER — FENTANYL CITRATE 50 UG/ML
INJECTION, SOLUTION INTRAMUSCULAR; INTRAVENOUS
Status: DISPENSED
Start: 2020-09-25

## (undated) RX ORDER — FENTANYL CITRATE 50 UG/ML
INJECTION, SOLUTION INTRAMUSCULAR; INTRAVENOUS
Status: DISPENSED
Start: 2022-02-09

## (undated) RX ORDER — EPHEDRINE SULFATE 50 MG/ML
INJECTION, SOLUTION INTRAMUSCULAR; INTRAVENOUS; SUBCUTANEOUS
Status: DISPENSED
Start: 2021-05-21

## (undated) RX ORDER — ONDANSETRON 2 MG/ML
INJECTION INTRAMUSCULAR; INTRAVENOUS
Status: DISPENSED
Start: 2019-02-08